# Patient Record
Sex: MALE | Race: OTHER | NOT HISPANIC OR LATINO
[De-identification: names, ages, dates, MRNs, and addresses within clinical notes are randomized per-mention and may not be internally consistent; named-entity substitution may affect disease eponyms.]

---

## 2020-04-02 ENCOUNTER — APPOINTMENT (OUTPATIENT)
Dept: ENDOCRINOLOGY | Facility: CLINIC | Age: 55
End: 2020-04-02
Payer: MEDICAID

## 2020-04-02 VITALS
TEMPERATURE: 98.7 F | SYSTOLIC BLOOD PRESSURE: 162 MMHG | OXYGEN SATURATION: 99 % | HEART RATE: 101 BPM | BODY MASS INDEX: 21.97 KG/M2 | DIASTOLIC BLOOD PRESSURE: 80 MMHG | WEIGHT: 162 LBS | RESPIRATION RATE: 16 BRPM

## 2020-04-02 LAB — GLUCOSE BLDC GLUCOMTR-MCNC: 187

## 2020-04-02 PROCEDURE — 82962 GLUCOSE BLOOD TEST: CPT

## 2020-04-02 PROCEDURE — 83036 HEMOGLOBIN GLYCOSYLATED A1C: CPT | Mod: QW

## 2020-04-02 PROCEDURE — 99204 OFFICE O/P NEW MOD 45 MIN: CPT

## 2020-04-02 RX ORDER — EMPAGLIFLOZIN AND METFORMIN HYDROCHLORIDE 12.5; 1 MG/1; MG/1
12.5-1 TABLET ORAL
Qty: 180 | Refills: 1 | Status: DISCONTINUED | COMMUNITY
Start: 2020-04-02 | End: 2020-04-02

## 2020-04-03 LAB — HBA1C MFR BLD HPLC: 9.6

## 2020-04-03 RX ORDER — LANCETS 28 GAUGE
EACH MISCELLANEOUS
Qty: 2 | Refills: 3 | Status: DISCONTINUED | COMMUNITY
Start: 2020-04-02 | End: 2020-04-03

## 2020-04-03 RX ORDER — BLOOD SUGAR DIAGNOSTIC
STRIP MISCELLANEOUS
Qty: 2 | Refills: 3 | Status: DISCONTINUED | COMMUNITY
Start: 2020-04-02 | End: 2020-04-03

## 2020-04-05 NOTE — HISTORY OF PRESENT ILLNESS
[FreeTextEntry1] : Mr. NIRU OTERO  is a 54 year-old  male  who presents for initial endocrine evaluation with regard to a hx of type 2 dm. The diabetes has been present for about 8   years. He states in 2012 he was dx with type 2 dm at time of cva. After this stroke, he has been left with mild numbness down left side of his body., He too was dx with htn. Since that time he he has been taking various medications for the dm with suboptimal glycemic control  He was recently in [place] and given the equivalent of Jardiance 12.5 mg bid and Metformin 850 mg bid in a combo pill-ie equivalent to this countries Synjardy.. He denies any history of neuropathy or nephropathy. With regard to neuropathy, He    . For the diabetes, He   is currently taking   He   denies polyuria, polydipsia, or any visual changes. He  too denies any skin lesions, skin breakdown or non-healing areas of skin. He too denies any podiatric concerns. Ophthalmologic evaluation is up to date. .  He  does deny any hypoglycemia or hypoglycemic signs or symptoms.\par Additional medical history includes that of hypertension and hyperlipidemia.  Bg's of late are not tested except in am with values in the 130's  He feels these numbers are much better than prior.\par He dd have full blood eval in [place] recently and will forward this info to us-he refused to carry out blood today.\par HbA1c today was 9.6%.\par

## 2020-06-15 ENCOUNTER — APPOINTMENT (OUTPATIENT)
Dept: ENDOCRINOLOGY | Facility: CLINIC | Age: 55
End: 2020-06-15
Payer: MEDICAID

## 2020-06-15 VITALS
SYSTOLIC BLOOD PRESSURE: 142 MMHG | TEMPERATURE: 98.5 F | DIASTOLIC BLOOD PRESSURE: 78 MMHG | WEIGHT: 168 LBS | HEIGHT: 72 IN | HEART RATE: 95 BPM | BODY MASS INDEX: 22.75 KG/M2 | OXYGEN SATURATION: 98 %

## 2020-06-15 LAB
GLUCOSE BLDC GLUCOMTR-MCNC: 121
HBA1C MFR BLD HPLC: 6.9

## 2020-06-15 PROCEDURE — 83036 HEMOGLOBIN GLYCOSYLATED A1C: CPT | Mod: QW

## 2020-06-15 PROCEDURE — 99214 OFFICE O/P EST MOD 30 MIN: CPT

## 2020-06-15 PROCEDURE — 82962 GLUCOSE BLOOD TEST: CPT

## 2020-06-15 NOTE — PHYSICAL EXAM
[Alert] : alert [No Acute Distress] : no acute distress [Well Nourished] : well nourished [Well Developed] : well developed [Normal Sclera/Conjunctiva] : normal sclera/conjunctiva [EOMI] : extra ocular movement intact [No Proptosis] : no proptosis [Normal Oropharynx] : the oropharynx was normal [No Thyroid Nodules] : no palpable thyroid nodules [Thyroid Not Enlarged] : the thyroid was not enlarged [No Respiratory Distress] : no respiratory distress [No Accessory Muscle Use] : no accessory muscle use [Normal S1, S2] : normal S1 and S2 [Clear to Auscultation] : lungs were clear to auscultation bilaterally [Regular Rhythm] : with a regular rhythm [No Edema] : no peripheral edema [Normal Rate] : heart rate was normal [Pedal Pulses Normal] : the pedal pulses are present [Normal Bowel Sounds] : normal bowel sounds [Not Distended] : not distended [Soft] : abdomen soft [Not Tender] : non-tender [Normal Posterior Cervical Nodes] : no posterior cervical lymphadenopathy [Normal Anterior Cervical Nodes] : no anterior cervical lymphadenopathy [No Spinal Tenderness] : no spinal tenderness [Spine Straight] : spine straight [Normal Gait] : normal gait [No Stigmata of Cushings Syndrome] : no stigmata of Cushings Syndrome [No Rash] : no rash [Normal Strength/Tone] : muscle strength and tone were normal [Normal Reflexes] : deep tendon reflexes were 2+ and symmetric [Acanthosis Nigricans] : no acanthosis nigricans [No Tremors] : no tremors [Oriented x3] : oriented to person, place, and time

## 2020-06-15 NOTE — HISTORY OF PRESENT ILLNESS
[FreeTextEntry1] : Mr. NIRU OTERO  is a 54 year-old  male  who  returns for evaluation evaluation with regard to a hx of type 2 dm. The diabetes has been present for about 8   years. He states in 2012 he was dx with type 2 dm at time of cva. After this stroke, he has been left with mild numbness down left side of his body., He too was dx with htn. Since that time he he has been taking various medications for the dm with suboptimal glycemic control  He is now takiing Glipizide 10 mg bid, Metfromin 1000 mg bid and Steglatro 15 mg daily.  He too is taking Lipitor, and Amlodopine. He denies any history of neuropathy or nephropathy. With regard to neuropathy, He    . For the diabetes, He   is currently taking   He   denies polyuria, polydipsia, or any visual changes. He  too denies any skin lesions, skin breakdown or non-healing areas of skin. He too denies any podiatric concerns. Ophthalmologic evaluation is up to date. .  He  does deny any hypoglycemia or hypoglycemic signs or symptoms.  A1c was 6.9% in April-- now  at 6.9%.\par Additional medical history includes that of hypertension and hyperlipidemia.  Bg's of late are not tested except in am with values in the 130's  He feels these numbers are much better than prior.\par \par \par

## 2020-07-17 ENCOUNTER — APPOINTMENT (OUTPATIENT)
Dept: ENDOCRINOLOGY | Facility: CLINIC | Age: 55
End: 2020-07-17
Payer: MEDICAID

## 2020-07-17 PROCEDURE — 99214 OFFICE O/P EST MOD 30 MIN: CPT | Mod: 95

## 2020-07-17 RX ORDER — SIMVASTATIN 40 MG/1
40 TABLET, FILM COATED ORAL
Qty: 90 | Refills: 1 | Status: DISCONTINUED | COMMUNITY
Start: 2020-07-07 | End: 2020-07-17

## 2020-07-21 RX ORDER — ROSUVASTATIN CALCIUM 10 MG/1
10 TABLET, FILM COATED ORAL
Qty: 60 | Refills: 0 | Status: DISCONTINUED | COMMUNITY
Start: 2020-07-17 | End: 2020-07-21

## 2020-08-08 NOTE — PHYSICAL EXAM
[Alert] : alert [Well Nourished] : well nourished [Oriented x3] : oriented to person, place, and time [Normal Affect] : the affect was normal [Normal Insight/Judgement] : insight and judgment were intact [Normal Mood] : the mood was normal

## 2020-08-08 NOTE — HISTORY OF PRESENT ILLNESS
[Home] : at home, [unfilled] , at the time of the visit. [Verbal consent obtained from patient] : the patient, [unfilled] [FreeTextEntry1] : Mr. NIRU OTERO  is a 54 year-old  male  who  returns for evaluation via telehealth with regard to a hx of type 2 dm. The diabetes has been present for about 8   years. He states in 2012 he was dx with type 2 dm at time of cva. After this stroke, he has been left with mild numbness down left side of his body., He too was dx with htn. Since that time he he has been taking various medications for the dm with suboptimal glycemic control  He is  taking Glipizide 10 mg now once per down, Metformin 1000 mg bid and Steglatro 15 mg daily.  He too is taking simvastatin and Amlodipine. He denies any history of neuropathy or nephropathy and has no known hx of retinopathy. He   denies polyuria, polydipsia, or any visual changes. He  too denies any skin lesions, skin breakdown or non-healing areas of skin. He too denies any podiatric concerns. Ophthalmologic evaluation is up to date. .He  does deny any hypoglycemia or hypoglycemic signs or symptoms.  A1c  6.9%in June .  AM  bg 110-140 lowest value of 80. and pre dinner 125-147\par Additional medical history includes that of hypertension and hyperlipidemia. He feels these numbers are much better than prior.\par \par \par

## 2020-08-24 ENCOUNTER — APPOINTMENT (OUTPATIENT)
Dept: ENDOCRINOLOGY | Facility: CLINIC | Age: 55
End: 2020-08-24

## 2020-08-24 NOTE — HISTORY OF PRESENT ILLNESS
[FreeTextEntry1] : Mr. NIRU OTERO  is a 54 year-old  male  who  returns for evaluation via telehealth with regard to a hx of type 2 dm. The diabetes has been present for about 8  years. Dx in 2012 with type 2 DM  at time of cva. After this stroke, he has been left with mild numbness down left side of his body., He too was dx with htn.   He is  taking Glipizide 10 mg now once per down, Metformin 1000 mg bid and Steglatro 15 mg daily.  He too is taking simvastatin and Amlodipine. He denies any history of neuropathy or nephropathy and has no known hx of retinopathy. He   denies polyuria, polydipsia, or any visual changes. He  too denies any skin lesions, skin breakdown or non-healing areas of skin. He too denies any podiatric concerns. Ophthalmologic evaluation is up to date. .He  does deny any hypoglycemia or hypoglycemic signs or symptoms.  AM  bg  lowest value of . and pre dinner  \par Additional medical history includes that of hypertension and hyperlipidemia. He continues on Atorvastatin 10 mg and amlodipine 10 mg.\par \par \par

## 2020-09-23 ENCOUNTER — APPOINTMENT (OUTPATIENT)
Dept: ENDOCRINOLOGY | Facility: CLINIC | Age: 55
End: 2020-09-23
Payer: MEDICAID

## 2020-09-23 VITALS
HEART RATE: 68 BPM | DIASTOLIC BLOOD PRESSURE: 80 MMHG | HEIGHT: 72 IN | BODY MASS INDEX: 23.03 KG/M2 | RESPIRATION RATE: 17 BRPM | SYSTOLIC BLOOD PRESSURE: 160 MMHG | WEIGHT: 170 LBS | OXYGEN SATURATION: 98 %

## 2020-09-23 PROCEDURE — 83036 HEMOGLOBIN GLYCOSYLATED A1C: CPT | Mod: QW

## 2020-09-23 PROCEDURE — 82962 GLUCOSE BLOOD TEST: CPT

## 2020-09-23 PROCEDURE — 99214 OFFICE O/P EST MOD 30 MIN: CPT | Mod: 25

## 2020-09-23 PROCEDURE — 36415 COLL VENOUS BLD VENIPUNCTURE: CPT

## 2020-09-23 RX ORDER — GLIPIZIDE 10 MG/1
10 TABLET ORAL DAILY
Qty: 90 | Refills: 1 | Status: DISCONTINUED | COMMUNITY
Start: 2020-04-02 | End: 2020-09-23

## 2020-09-23 NOTE — HISTORY OF PRESENT ILLNESS
[FreeTextEntry1] : Mr. OTERO is a 55 year year old  male who  returns today in follow up with regard to a history of type 2 diabetes mellitus. The diabetes mellitus has been present for about 9 years  There is no known history of retinopathy, nephropathy. He  too denies any history of neuropathy. Current dm medications include Glipizide 10 mg Qd and Metformin 1000 mg BID. and Steglatro Patient checks BG levels at home once daily. HGM of late has shown values to be running 100-140 mg/dL . Patient did have one reading of 84 mg/dL Patient denies any chest pain, sob, neurologic or ophthalmologic complaints. He  too denies any new podiatric concerns. He  is up to date with his ophthalmologic visit.\par POCT glucose returned today at 140 mg/dL\par POCT A1c returned today at 6.5%; previously 6.9% in June 2020.\par Tried Jardiance in the past but cost was a concern\par [pat h xof cva  some tingling grt\par

## 2020-10-07 LAB
25(OH)D3 SERPL-MCNC: 30.4 NG/ML
ALBUMIN SERPL ELPH-MCNC: 4.5 G/DL
ALP BLD-CCNC: 59 U/L
ALT SERPL-CCNC: 17 U/L
ANION GAP SERPL CALC-SCNC: 15 MMOL/L
AST SERPL-CCNC: 19 U/L
BILIRUB SERPL-MCNC: 0.3 MG/DL
BUN SERPL-MCNC: 18 MG/DL
CALCIUM SERPL-MCNC: 9.4 MG/DL
CHLORIDE SERPL-SCNC: 101 MMOL/L
CHOLEST SERPL-MCNC: 317 MG/DL
CO2 SERPL-SCNC: 24 MMOL/L
CREAT SERPL-MCNC: 1.22 MG/DL
CREAT SPEC-SCNC: 143 MG/DL
ESTIMATED AVERAGE GLUCOSE: 143 MG/DL
FRUCTOSAMINE SERPL-MCNC: 263 UMOL/L
GLUCOSE BLDC GLUCOMTR-MCNC: 140
GLUCOSE SERPL-MCNC: 126 MG/DL
GLYCOMARK.: 0.5 UG/ML
HBA1C MFR BLD HPLC: 6.5
HBA1C MFR BLD HPLC: 6.6 %
HDLC SERPL-MCNC: 60 MG/DL
LDLC SERPL DIRECT ASSAY-MCNC: 235 MG/DL
MICROALBUMIN 24H UR DL<=1MG/L-MCNC: 27.9 MG/DL
MICROALBUMIN/CREAT 24H UR-RTO: 195 MG/G
POTASSIUM SERPL-SCNC: 3.8 MMOL/L
PROT SERPL-MCNC: 7.3 G/DL
SODIUM SERPL-SCNC: 140 MMOL/L
T3FREE SERPL-MCNC: 2.56 PG/ML
T4 FREE SERPL-MCNC: 1.7 NG/DL
TRIGL SERPL-MCNC: 78 MG/DL
TSH SERPL-ACNC: 0.89 UIU/ML

## 2020-12-14 ENCOUNTER — APPOINTMENT (OUTPATIENT)
Dept: ENDOCRINOLOGY | Facility: CLINIC | Age: 55
End: 2020-12-14
Payer: MEDICAID

## 2020-12-21 ENCOUNTER — APPOINTMENT (OUTPATIENT)
Dept: ENDOCRINOLOGY | Facility: CLINIC | Age: 55
End: 2020-12-21
Payer: MEDICAID

## 2020-12-21 VITALS
DIASTOLIC BLOOD PRESSURE: 82 MMHG | BODY MASS INDEX: 22.89 KG/M2 | HEART RATE: 78 BPM | WEIGHT: 169 LBS | SYSTOLIC BLOOD PRESSURE: 140 MMHG | OXYGEN SATURATION: 99 % | RESPIRATION RATE: 16 BRPM | HEIGHT: 72 IN | TEMPERATURE: 98.6 F

## 2020-12-21 DIAGNOSIS — Z82.49 FAMILY HISTORY OF ISCHEMIC HEART DISEASE AND OTHER DISEASES OF THE CIRCULATORY SYSTEM: ICD-10-CM

## 2020-12-21 LAB
GLUCOSE BLDC GLUCOMTR-MCNC: 140
HBA1C MFR BLD HPLC: 6.3

## 2020-12-21 PROCEDURE — 99072 ADDL SUPL MATRL&STAF TM PHE: CPT

## 2020-12-21 PROCEDURE — 99214 OFFICE O/P EST MOD 30 MIN: CPT

## 2020-12-21 PROCEDURE — 82962 GLUCOSE BLOOD TEST: CPT

## 2020-12-21 PROCEDURE — 83036 HEMOGLOBIN GLYCOSYLATED A1C: CPT | Mod: QW

## 2020-12-21 RX ORDER — ATORVASTATIN CALCIUM 40 MG/1
40 TABLET, FILM COATED ORAL
Qty: 1 | Refills: 1 | Status: DISCONTINUED | COMMUNITY
Start: 2020-07-21 | End: 2020-12-21

## 2020-12-22 ENCOUNTER — NON-APPOINTMENT (OUTPATIENT)
Age: 55
End: 2020-12-22

## 2020-12-23 RX ORDER — ROSUVASTATIN CALCIUM 40 MG/1
40 TABLET, FILM COATED ORAL
Qty: 90 | Refills: 1 | Status: DISCONTINUED | COMMUNITY
Start: 2020-12-21 | End: 2020-12-23

## 2020-12-30 ENCOUNTER — APPOINTMENT (OUTPATIENT)
Dept: ENDOCRINOLOGY | Facility: CLINIC | Age: 55
End: 2020-12-30

## 2020-12-30 NOTE — HISTORY OF PRESENT ILLNESS
[FreeTextEntry1] : Mr. OTERO is a 55 year year old  male who  returns today in follow up with regard to a history of type 2 diabetes mellitus. The diabetes mellitus has been present for about 9 years  There is no known history of retinopathy, nephropathy. He  too denies any history of neuropathy. Current dm medications include Glipizide 10 mg Qd and Metformin 1000 mg BID. and Steglatro  15 mg. Patient checks BG levels at home once daily. HGM of late has shown values to be running 90's to low 100's . Patient did have one reading of 84 mg/dL Patient denies any chest pain, sob, neurologic or ophthalmologic complaints. He  too denies any new podiatric concerns. He  is up to date with his ophthalmologic visit.\par he is on Simvastatin 40 mg but was only taking 20 mg Too is on Amlodipine 10 mg and  asa\par Had mini stroke in past. \par  was on simvastatin 20 mg-will have to change.\par Fh  father MI around age 35-40\par

## 2021-01-25 ENCOUNTER — NON-APPOINTMENT (OUTPATIENT)
Age: 56
End: 2021-01-25

## 2021-01-25 ENCOUNTER — APPOINTMENT (OUTPATIENT)
Dept: ENDOCRINOLOGY | Facility: CLINIC | Age: 56
End: 2021-01-25

## 2021-01-25 ENCOUNTER — APPOINTMENT (OUTPATIENT)
Dept: CARDIOLOGY | Facility: CLINIC | Age: 56
End: 2021-01-25

## 2021-02-01 ENCOUNTER — APPOINTMENT (OUTPATIENT)
Dept: CARDIOLOGY | Facility: CLINIC | Age: 56
End: 2021-02-01

## 2021-02-01 ENCOUNTER — APPOINTMENT (OUTPATIENT)
Dept: ENDOCRINOLOGY | Facility: CLINIC | Age: 56
End: 2021-02-01

## 2021-02-22 ENCOUNTER — APPOINTMENT (OUTPATIENT)
Dept: CARDIOLOGY | Facility: CLINIC | Age: 56
End: 2021-02-22

## 2021-02-22 ENCOUNTER — APPOINTMENT (OUTPATIENT)
Dept: ENDOCRINOLOGY | Facility: CLINIC | Age: 56
End: 2021-02-22

## 2021-03-13 LAB
25(OH)D3 SERPL-MCNC: 36.1 NG/ML
ALBUMIN SERPL ELPH-MCNC: 4.3 G/DL
ALP BLD-CCNC: 58 U/L
ALT SERPL-CCNC: 16 U/L
ANION GAP SERPL CALC-SCNC: 12 MMOL/L
AST SERPL-CCNC: 16 U/L
BILIRUB SERPL-MCNC: 0.3 MG/DL
BUN SERPL-MCNC: 23 MG/DL
CALCIUM SERPL-MCNC: 9.4 MG/DL
CHLORIDE SERPL-SCNC: 104 MMOL/L
CHOLEST SERPL-MCNC: 226 MG/DL
CO2 SERPL-SCNC: 24 MMOL/L
CREAT SERPL-MCNC: 1.2 MG/DL
CREAT SPEC-SCNC: 171 MG/DL
ESTIMATED AVERAGE GLUCOSE: 140 MG/DL
FRUCTOSAMINE SERPL-MCNC: 259 UMOL/L
GLUCOSE SERPL-MCNC: 117 MG/DL
GLYCOMARK.: 0.7 UG/ML
HBA1C MFR BLD HPLC: 6.5 %
HDLC SERPL-MCNC: 62 MG/DL
LDLC SERPL DIRECT ASSAY-MCNC: 148 MG/DL
MICROALBUMIN 24H UR DL<=1MG/L-MCNC: 23.1 MG/DL
MICROALBUMIN/CREAT 24H UR-RTO: 135 MG/G
POTASSIUM SERPL-SCNC: 4.1 MMOL/L
PROT SERPL-MCNC: 7.1 G/DL
SODIUM SERPL-SCNC: 139 MMOL/L
T3FREE SERPL-MCNC: 2.32 PG/ML
T4 FREE SERPL-MCNC: 1.6 NG/DL
TRIGL SERPL-MCNC: 55 MG/DL
TSH SERPL-ACNC: 0.71 UIU/ML

## 2021-03-29 ENCOUNTER — APPOINTMENT (OUTPATIENT)
Dept: ENDOCRINOLOGY | Facility: CLINIC | Age: 56
End: 2021-03-29
Payer: MEDICAID

## 2021-03-29 VITALS
WEIGHT: 171 LBS | HEART RATE: 100 BPM | DIASTOLIC BLOOD PRESSURE: 82 MMHG | OXYGEN SATURATION: 97 % | RESPIRATION RATE: 16 BRPM | SYSTOLIC BLOOD PRESSURE: 150 MMHG | BODY MASS INDEX: 23.19 KG/M2 | TEMPERATURE: 98.6 F

## 2021-03-29 PROCEDURE — 82962 GLUCOSE BLOOD TEST: CPT

## 2021-03-29 PROCEDURE — 99214 OFFICE O/P EST MOD 30 MIN: CPT

## 2021-03-29 PROCEDURE — 99072 ADDL SUPL MATRL&STAF TM PHE: CPT

## 2021-03-29 PROCEDURE — 83036 HEMOGLOBIN GLYCOSYLATED A1C: CPT | Mod: QW

## 2021-03-29 NOTE — HISTORY OF PRESENT ILLNESS
[FreeTextEntry1] : Mr. OTERO is a 55 year old  male who  returns today in follow up with regard to a history of type 2 diabetes mellitus. The diabetes mellitus has been present for about 10 years  There is no known history of retinopathy, nephropathy. . Current dm medications include Glipizide 10 mg Qd , Alogliptin 25 mg dailly,  Metformin 1000 mg BID. and Steglatro  15 mg. Patient checks BG levels at home once daily. HGM of late has shown values to be running    in the low 100s in the AM and after eating. Patient denies any chest pain, sob, neurologic or ophthalmologic complaints. He  too denies any new podiatric concerns. He  is up to date with his ophthalmologic visit.\par POCT A1c returned today at 6.2%  ; previously 6.5% on 12/21/2021\par POCT glucose returned today at  157  mg/dL \par He is on Atorvastatin 80 mg Too is on Amlodipine 10 mg and  asa\par Had mini stroke in past. \par Fh  father MI around age 35-40\par

## 2021-03-30 ENCOUNTER — NON-APPOINTMENT (OUTPATIENT)
Age: 56
End: 2021-03-30

## 2021-03-30 LAB
GLUCOSE BLDC GLUCOMTR-MCNC: 157
HBA1C MFR BLD HPLC: 6.2

## 2021-07-12 ENCOUNTER — APPOINTMENT (OUTPATIENT)
Dept: ENDOCRINOLOGY | Facility: CLINIC | Age: 56
End: 2021-07-12

## 2021-07-12 NOTE — HISTORY OF PRESENT ILLNESS
[FreeTextEntry1] : Mr. OTERO is a 55 year old male who returns today in follow up with regard to a history of type 2 diabetes mellitus. The diabetes mellitus has been present for about 10 years There is no known history of retinopathy, nephropathy. . Current dm medications include Glipizide 10 mg Qd , Alogliptin 25 mg dailly, Metformin 1000 mg BID. and Steglatro 15 mg. Patient checks BG levels at home once daily. HGM of late has shown values to be running           Patient denies any chest pain, sob, neurologic or ophthalmologic complaints. He too denies any new podiatric concerns. He is up to date with his ophthalmologic visit.\par POCT A1c returned today at    ; previously \par POCT glucose returned today at    mg/dL \par He is on Atorvastatin 80 mg Too is on Amlodipine 10 mg and asa\par Had mini stroke in past.

## 2021-11-08 ENCOUNTER — APPOINTMENT (OUTPATIENT)
Dept: ENDOCRINOLOGY | Facility: CLINIC | Age: 56
End: 2021-11-08
Payer: MEDICAID

## 2021-11-08 VITALS
HEIGHT: 72 IN | DIASTOLIC BLOOD PRESSURE: 82 MMHG | SYSTOLIC BLOOD PRESSURE: 147 MMHG | WEIGHT: 159 LBS | TEMPERATURE: 98.1 F | HEART RATE: 50 BPM | BODY MASS INDEX: 21.54 KG/M2 | OXYGEN SATURATION: 98 %

## 2021-11-08 DIAGNOSIS — N52.9 MALE ERECTILE DYSFUNCTION, UNSPECIFIED: ICD-10-CM

## 2021-11-08 LAB
GLUCOSE BLDC GLUCOMTR-MCNC: 122
HBA1C MFR BLD HPLC: 6.1

## 2021-11-08 PROCEDURE — 83036 HEMOGLOBIN GLYCOSYLATED A1C: CPT | Mod: QW

## 2021-11-08 PROCEDURE — 99214 OFFICE O/P EST MOD 30 MIN: CPT

## 2021-11-08 PROCEDURE — 82962 GLUCOSE BLOOD TEST: CPT

## 2021-11-12 NOTE — HISTORY OF PRESENT ILLNESS
[FreeTextEntry1] : Mr. OTERO is a 56 year old  male who  returns today in follow up with regard to a history of type 2 diabetes mellitus. The diabetes mellitus has been present for about 10 years  There is no known history of retinopathy, nephropathy. He  too denies any history of neuropathy. Current dm medications include Glipizide 10 mg Qd , Alogliptin 25 mg dailly,  Metformin 1000 mg BID. and Steglatro  15 mg. Patient checks BG levels at home once daily. HGM of late has shown values to be running  82- 103 in the am. Occasional values in the 60s.  Patient denies any chest pain, sob, neurologic or ophthalmologic complaints. He  too denies any new podiatric concerns. He  is up to date with his ophthalmologic visit Has lost 10 lbs in the past few months- states he has decreased carbohydrates from his diet. Has been juicing squash in the evening. \par POCT A1c returned today at 6.1%  ; previously 6.2% from 3/29/2021\par POCT glucose returned today at  122 mg/dL \par He is on Atorvastatin 80 mg Too is on Amlodipine 10 mg (has not taken for the past 5 days)  and  asa.\par Was told by urologist Amlodipine may be attributing to his erectile dysfunction. \par Had mini stroke in past. \par \par Fh  father MI around age 35-40\par

## 2021-11-16 ENCOUNTER — NON-APPOINTMENT (OUTPATIENT)
Age: 56
End: 2021-11-16

## 2021-11-17 LAB
25(OH)D3 SERPL-MCNC: 30.3 NG/ML
ALBUMIN SERPL ELPH-MCNC: 4.2 G/DL
ALP BLD-CCNC: 67 U/L
ALT SERPL-CCNC: 26 U/L
ANION GAP SERPL CALC-SCNC: 16 MMOL/L
AST SERPL-CCNC: 18 U/L
BASOPHILS # BLD AUTO: 0.05 K/UL
BASOPHILS NFR BLD AUTO: 0.5 %
BILIRUB SERPL-MCNC: 0.4 MG/DL
BUN SERPL-MCNC: 23 MG/DL
CALCIUM SERPL-MCNC: 9.9 MG/DL
CHLORIDE SERPL-SCNC: 101 MMOL/L
CHOLEST SERPL-MCNC: 317 MG/DL
CO2 SERPL-SCNC: 23 MMOL/L
CREAT SERPL-MCNC: 1.32 MG/DL
CREAT SPEC-SCNC: 215 MG/DL
EOSINOPHIL # BLD AUTO: 0.25 K/UL
EOSINOPHIL NFR BLD AUTO: 2.5 %
ESTIMATED AVERAGE GLUCOSE: 134 MG/DL
FRUCTOSAMINE SERPL-MCNC: 282 UMOL/L
GLUCOSE SERPL-MCNC: 109 MG/DL
GLYCOMARK.: 0.5 UG/ML
HBA1C MFR BLD HPLC: 6.3 %
HCT VFR BLD CALC: 51.2 %
HDLC SERPL-MCNC: 58 MG/DL
HGB BLD-MCNC: 16.1 G/DL
IMM GRANULOCYTES NFR BLD AUTO: 0.4 %
LDLC SERPL DIRECT ASSAY-MCNC: 229 MG/DL
LYMPHOCYTES # BLD AUTO: 2.01 K/UL
LYMPHOCYTES NFR BLD AUTO: 20.4 %
MAN DIFF?: NORMAL
MCHC RBC-ENTMCNC: 26.2 PG
MCHC RBC-ENTMCNC: 31.4 GM/DL
MCV RBC AUTO: 83.3 FL
MICROALBUMIN 24H UR DL<=1MG/L-MCNC: 28.1 MG/DL
MICROALBUMIN/CREAT 24H UR-RTO: 131 MG/G
MONOCYTES # BLD AUTO: 0.67 K/UL
MONOCYTES NFR BLD AUTO: 6.8 %
NEUTROPHILS # BLD AUTO: 6.83 K/UL
NEUTROPHILS NFR BLD AUTO: 69.4 %
PLATELET # BLD AUTO: 306 K/UL
POTASSIUM SERPL-SCNC: 4.1 MMOL/L
PROT SERPL-MCNC: 7.4 G/DL
RBC # BLD: 6.15 M/UL
RBC # FLD: 13.7 %
SODIUM SERPL-SCNC: 140 MMOL/L
T3FREE SERPL-MCNC: 2.43 PG/ML
T4 FREE SERPL-MCNC: 1.8 NG/DL
TRIGL SERPL-MCNC: 99 MG/DL
TSH SERPL-ACNC: 1.4 UIU/ML
VIT B12 SERPL-MCNC: 336 PG/ML
WBC # FLD AUTO: 9.85 K/UL

## 2021-11-18 ENCOUNTER — NON-APPOINTMENT (OUTPATIENT)
Age: 56
End: 2021-11-18

## 2021-11-18 RX ORDER — EVOLOCUMAB 140 MG/ML
140 INJECTION, SOLUTION SUBCUTANEOUS
Qty: 2 | Refills: 1 | Status: DISCONTINUED | COMMUNITY
Start: 2021-11-17 | End: 2021-11-18

## 2021-12-02 ENCOUNTER — NON-APPOINTMENT (OUTPATIENT)
Age: 56
End: 2021-12-02

## 2022-01-24 ENCOUNTER — APPOINTMENT (OUTPATIENT)
Dept: ENDOCRINOLOGY | Facility: CLINIC | Age: 57
End: 2022-01-24
Payer: MEDICAID

## 2022-01-24 VITALS
RESPIRATION RATE: 15 BRPM | HEART RATE: 95 BPM | SYSTOLIC BLOOD PRESSURE: 158 MMHG | OXYGEN SATURATION: 98 % | WEIGHT: 159 LBS | TEMPERATURE: 98.6 F | DIASTOLIC BLOOD PRESSURE: 82 MMHG | BODY MASS INDEX: 21.56 KG/M2

## 2022-01-24 VITALS — DIASTOLIC BLOOD PRESSURE: 84 MMHG | SYSTOLIC BLOOD PRESSURE: 150 MMHG

## 2022-01-24 LAB
GLUCOSE BLDC GLUCOMTR-MCNC: 138
HBA1C MFR BLD HPLC: 6.2

## 2022-01-24 PROCEDURE — 99214 OFFICE O/P EST MOD 30 MIN: CPT

## 2022-01-24 PROCEDURE — 83036 HEMOGLOBIN GLYCOSYLATED A1C: CPT | Mod: QW

## 2022-01-24 PROCEDURE — 82962 GLUCOSE BLOOD TEST: CPT

## 2022-01-24 RX ORDER — BLOOD-GLUCOSE METER
KIT MISCELLANEOUS
Qty: 1 | Refills: 0 | Status: ACTIVE | COMMUNITY
Start: 2022-01-24 | End: 1900-01-01

## 2022-01-24 RX ORDER — BLOOD SUGAR DIAGNOSTIC
STRIP MISCELLANEOUS 3 TIMES DAILY
Qty: 3 | Refills: 1 | Status: DISCONTINUED | COMMUNITY
Start: 2020-04-03 | End: 2022-01-24

## 2022-01-24 RX ORDER — BLOOD-GLUCOSE METER
W/DEVICE KIT MISCELLANEOUS
Qty: 1 | Refills: 0 | Status: DISCONTINUED | COMMUNITY
Start: 2020-04-03 | End: 2022-01-24

## 2022-01-24 RX ORDER — LANCETS 28 GAUGE
EACH MISCELLANEOUS
Qty: 2 | Refills: 2 | Status: ACTIVE | COMMUNITY
Start: 2022-01-24 | End: 1900-01-01

## 2022-01-24 RX ORDER — BLOOD SUGAR DIAGNOSTIC
STRIP MISCELLANEOUS
Qty: 2 | Refills: 2 | Status: ACTIVE | COMMUNITY
Start: 2022-01-24 | End: 1900-01-01

## 2022-01-24 RX ORDER — LANCETS
EACH MISCELLANEOUS
Qty: 3 | Refills: 1 | Status: DISCONTINUED | COMMUNITY
Start: 2020-04-03 | End: 2022-01-24

## 2022-03-21 ENCOUNTER — APPOINTMENT (OUTPATIENT)
Dept: ENDOCRINOLOGY | Facility: CLINIC | Age: 57
End: 2022-03-21

## 2022-04-04 ENCOUNTER — APPOINTMENT (OUTPATIENT)
Dept: ENDOCRINOLOGY | Facility: CLINIC | Age: 57
End: 2022-04-04

## 2022-05-16 ENCOUNTER — APPOINTMENT (OUTPATIENT)
Dept: ENDOCRINOLOGY | Facility: CLINIC | Age: 57
End: 2022-05-16

## 2022-05-16 NOTE — HISTORY OF PRESENT ILLNESS
[FreeTextEntry1] : Mr. OTERO is a 56 year old  male who  returns today in follow up with regard to a history of type 2 diabetes mellitus. The diabetes mellitus has been present for about 10 years  There is no known history of retinopathy, nephropathy. He  too denies any history of neuropathy. Current dm medications include Glipizide 5 mg Qd , Alogliptin 25 mg daily,  Metformin 1000 mg BID, and Steglatro  15 mg. Patient checks BG levels at home once daily. HGM of late has shown values to be running  105- 110 in the am, 130s 2 hrs after dinner. Denies any hypoglycemic episodes. Testing about 3-4 days per week at varied times throughout the day. Patient denies any chest pain, sob, neurologic or ophthalmologic complaints. He  too denies any new podiatric concerns. He  is up to date with his ophthalmologic visit  At last visit lost weight with adjustment in diet and juicing vegetables. Has continued to lose 10 lbs. \par POCT A1c returned today at 6.2% ; previously 6.3% from 11/8/2021\par POCT glucose returned today at 138\par 11/8/2021 LDL was 229 mg/dL, albumin/ creatinine ratio was 131 mg/g\par \par He is on Atorvastatin 80 mg. Too is on Amlodipine 5 mg and  asa. Off Losartan. Not taking Ezetimibe. \par \par Had mini stroke in past- continues to note left sided numbness. Following with neurology. \par \par FH:  father MI around age 35-40\par

## 2022-05-16 NOTE — HISTORY OF PRESENT ILLNESS
[FreeTextEntry1] : Mr. OTERO is a 56 year old  male who  returns today in follow up with regard to a history of type 2 diabetes mellitus. The diabetes mellitus has been present for over 10 years  There is no known history of retinopathy, nephropathy. He  too denies any history of neuropathy. Current dm medications include Glipizide Er 5  mg Qd , Alogliptin 25 mg dailly,  Metformin 1000 mg BID. and Steglatro  15 mg. Patient checks BG levels at home once daily. HGM of late has shown values to be running              .  Patient denies any chest pain, sob, neurologic or ophthalmologic complaints. He  too denies any new podiatric concerns. He  is up to date with his ophthalmologic visit  Over the last year-he had lost weight with adjustment in diet and juicing vegetables.\par \par POCT A1c returned today at \par POCT glucose returned today at \par He is on Atorvastatin 80 mg. Too is on Amlodipine 5 mg , Losartan 50 mg and  asa.\par Had mini stroke in past. \par \par FH:  father MI around age 35-40\par

## 2022-08-29 ENCOUNTER — APPOINTMENT (OUTPATIENT)
Dept: ENDOCRINOLOGY | Facility: CLINIC | Age: 57
End: 2022-08-29

## 2022-08-29 VITALS
HEART RATE: 93 BPM | DIASTOLIC BLOOD PRESSURE: 86 MMHG | WEIGHT: 160 LBS | SYSTOLIC BLOOD PRESSURE: 178 MMHG | BODY MASS INDEX: 21.67 KG/M2 | HEIGHT: 72 IN | OXYGEN SATURATION: 99 %

## 2022-08-29 VITALS — DIASTOLIC BLOOD PRESSURE: 80 MMHG | SYSTOLIC BLOOD PRESSURE: 162 MMHG

## 2022-08-29 DIAGNOSIS — I63.9 CEREBRAL INFARCTION, UNSPECIFIED: ICD-10-CM

## 2022-08-29 LAB
GLUCOSE BLDC GLUCOMTR-MCNC: 155
HBA1C MFR BLD HPLC: 6

## 2022-08-29 PROCEDURE — 36415 COLL VENOUS BLD VENIPUNCTURE: CPT

## 2022-08-29 PROCEDURE — 99214 OFFICE O/P EST MOD 30 MIN: CPT | Mod: 25

## 2022-08-29 PROCEDURE — 82962 GLUCOSE BLOOD TEST: CPT

## 2022-08-29 PROCEDURE — 83036 HEMOGLOBIN GLYCOSYLATED A1C: CPT | Mod: QW

## 2022-08-29 NOTE — HISTORY OF PRESENT ILLNESS
[FreeTextEntry1] : Mr. OTERO is a 57 year old male who returns today in follow up with regard to a history of type 2 diabetes mellitus. The diabetes mellitus has been present for about 10 years There is no known history of retinopathy, nephropathy. He too denies any history of neuropathy. \par \par Current dm medications include Glipizide 5 mg Qd, Metformin 1000 mg BID, and Steglatro 15 mg.\par \par Patient checks BG levels once weekly  HGM of late has shown values to be running -120 and after meals 140-150. Denies any hypoglycemic episodes.\par \par Patient denies any chest pain, sob, neurologic or ophthalmologic complaints. He too denies any new podiatric concerns. He is up to date with his ophthalmologic visit \par \par Weight has been stable at 160 lbs \par \par POCT A1c returned today at 6.0%  ; previously 6.2% \par POCT glucose returned today at 155\par \par 11/8/2021 LDL was 229 mg/dL, albumin/ creatinine ratio was 131 mg/g\par \par He is on Atorvastatin 80 mg 2-3x per week . Too is on Amlodipine 5 mg and asa on Losartan. Not taking Ezetimibe. \par \par Had mini stroke in past- continues to note left sided numbness. Following with neurology. \par \par FH: father MI around age 35-40\par \par PCP: Dr. Kavon Kennedy.\par

## 2022-08-30 LAB
25(OH)D3 SERPL-MCNC: 36.5 NG/ML
ALBUMIN SERPL ELPH-MCNC: 4.2 G/DL
ALP BLD-CCNC: 62 U/L
ALT SERPL-CCNC: 26 U/L
ANION GAP SERPL CALC-SCNC: 17 MMOL/L
APO B SERPL-MCNC: 110 MG/DL
AST SERPL-CCNC: 31 U/L
BILIRUB SERPL-MCNC: 0.3 MG/DL
BUN SERPL-MCNC: 16 MG/DL
CALCIUM SERPL-MCNC: 9.4 MG/DL
CHLORIDE SERPL-SCNC: 106 MMOL/L
CHOLEST SERPL-MCNC: 202 MG/DL
CO2 SERPL-SCNC: 18 MMOL/L
CREAT SERPL-MCNC: 1.26 MG/DL
CREAT SPEC-SCNC: 101 MG/DL
EGFR: 67 ML/MIN/1.73M2
ESTIMATED AVERAGE GLUCOSE: 126 MG/DL
FRUCTOSAMINE SERPL-MCNC: 257 UMOL/L
GLUCOSE SERPL-MCNC: 129 MG/DL
GLYCOMARK.: 0.9 UG/ML
HBA1C MFR BLD HPLC: 6 %
HDLC SERPL-MCNC: 61 MG/DL
LDLC SERPL CALC-MCNC: 129 MG/DL
LDLC SERPL DIRECT ASSAY-MCNC: 116 MG/DL
MAGNESIUM SERPL-MCNC: 2 MG/DL
MICROALBUMIN 24H UR DL<=1MG/L-MCNC: 6.9 MG/DL
MICROALBUMIN/CREAT 24H UR-RTO: 68 MG/G
NONHDLC SERPL-MCNC: 141 MG/DL
POTASSIUM SERPL-SCNC: 4 MMOL/L
PROT SERPL-MCNC: 7.2 G/DL
SODIUM SERPL-SCNC: 141 MMOL/L
T3FREE SERPL-MCNC: 2.2 PG/ML
T4 FREE SERPL-MCNC: 1.7 NG/DL
TRIGL SERPL-MCNC: 57 MG/DL
TSH SERPL-ACNC: 0.91 UIU/ML
VIT B12 SERPL-MCNC: 306 PG/ML

## 2022-08-31 LAB — APO LP(A) SERPL-MCNC: 24.1 NMOL/L

## 2022-08-31 RX ORDER — ADHESIVE TAPE 3"X 2.3 YD
50 MCG TAPE, NON-MEDICATED TOPICAL
Qty: 90 | Refills: 0 | Status: ACTIVE | COMMUNITY
Start: 2022-08-31 | End: 1900-01-01

## 2022-09-03 ENCOUNTER — TRANSCRIPTION ENCOUNTER (OUTPATIENT)
Age: 57
End: 2022-09-03

## 2022-09-03 ENCOUNTER — EMERGENCY (EMERGENCY)
Facility: HOSPITAL | Age: 57
LOS: 0 days | Discharge: HOME | End: 2022-09-03
Attending: STUDENT IN AN ORGANIZED HEALTH CARE EDUCATION/TRAINING PROGRAM

## 2022-09-03 VITALS
RESPIRATION RATE: 19 BRPM | SYSTOLIC BLOOD PRESSURE: 177 MMHG | DIASTOLIC BLOOD PRESSURE: 82 MMHG | HEIGHT: 73 IN | TEMPERATURE: 98 F | HEART RATE: 107 BPM | WEIGHT: 160.94 LBS | OXYGEN SATURATION: 97 %

## 2022-09-03 DIAGNOSIS — R10.12 LEFT UPPER QUADRANT PAIN: ICD-10-CM

## 2022-09-03 DIAGNOSIS — I10 ESSENTIAL (PRIMARY) HYPERTENSION: ICD-10-CM

## 2022-09-03 DIAGNOSIS — Z98.89 OTHER SPECIFIED POSTPROCEDURAL STATES: Chronic | ICD-10-CM

## 2022-09-03 DIAGNOSIS — R10.13 EPIGASTRIC PAIN: ICD-10-CM

## 2022-09-03 DIAGNOSIS — E11.9 TYPE 2 DIABETES MELLITUS WITHOUT COMPLICATIONS: ICD-10-CM

## 2022-09-03 LAB
ALBUMIN SERPL ELPH-MCNC: 3.2 G/DL — LOW (ref 3.3–5)
ALP SERPL-CCNC: 59 U/L — SIGNIFICANT CHANGE UP (ref 40–120)
ALT FLD-CCNC: 24 U/L — SIGNIFICANT CHANGE UP (ref 12–78)
ANION GAP SERPL CALC-SCNC: 7 MMOL/L — SIGNIFICANT CHANGE UP (ref 5–17)
AST SERPL-CCNC: 22 U/L — SIGNIFICANT CHANGE UP (ref 15–37)
BASOPHILS # BLD AUTO: 0.03 K/UL — SIGNIFICANT CHANGE UP (ref 0–0.2)
BASOPHILS NFR BLD AUTO: 0.4 % — SIGNIFICANT CHANGE UP (ref 0–2)
BILIRUB SERPL-MCNC: 0.5 MG/DL — SIGNIFICANT CHANGE UP (ref 0.2–1.2)
BUN SERPL-MCNC: 23 MG/DL — SIGNIFICANT CHANGE UP (ref 7–23)
CALCIUM SERPL-MCNC: 8.8 MG/DL — SIGNIFICANT CHANGE UP (ref 8.5–10.1)
CHLORIDE SERPL-SCNC: 106 MMOL/L — SIGNIFICANT CHANGE UP (ref 96–108)
CO2 SERPL-SCNC: 26 MMOL/L — SIGNIFICANT CHANGE UP (ref 22–31)
CREAT SERPL-MCNC: 1.35 MG/DL — HIGH (ref 0.5–1.3)
EGFR: 62 ML/MIN/1.73M2 — SIGNIFICANT CHANGE UP
EOSINOPHIL # BLD AUTO: 0.71 K/UL — HIGH (ref 0–0.5)
EOSINOPHIL NFR BLD AUTO: 9.4 % — HIGH (ref 0–6)
GLUCOSE SERPL-MCNC: 191 MG/DL — HIGH (ref 70–99)
HCT VFR BLD CALC: 42.6 % — SIGNIFICANT CHANGE UP (ref 39–50)
HGB BLD-MCNC: 13.6 G/DL — SIGNIFICANT CHANGE UP (ref 13–17)
IMM GRANULOCYTES NFR BLD AUTO: 0.4 % — SIGNIFICANT CHANGE UP (ref 0–1.5)
LACTATE SERPL-SCNC: 1.4 MMOL/L — SIGNIFICANT CHANGE UP (ref 0.7–2)
LIDOCAIN IGE QN: 432 U/L — HIGH (ref 73–393)
LYMPHOCYTES # BLD AUTO: 1.46 K/UL — SIGNIFICANT CHANGE UP (ref 1–3.3)
LYMPHOCYTES # BLD AUTO: 19.4 % — SIGNIFICANT CHANGE UP (ref 13–44)
MAGNESIUM SERPL-MCNC: 2.1 MG/DL — SIGNIFICANT CHANGE UP (ref 1.6–2.6)
MCHC RBC-ENTMCNC: 25.7 PG — LOW (ref 27–34)
MCHC RBC-ENTMCNC: 31.9 G/DL — LOW (ref 32–36)
MCV RBC AUTO: 80.4 FL — SIGNIFICANT CHANGE UP (ref 80–100)
MONOCYTES # BLD AUTO: 0.57 K/UL — SIGNIFICANT CHANGE UP (ref 0–0.9)
MONOCYTES NFR BLD AUTO: 7.6 % — SIGNIFICANT CHANGE UP (ref 2–14)
NEUTROPHILS # BLD AUTO: 4.74 K/UL — SIGNIFICANT CHANGE UP (ref 1.8–7.4)
NEUTROPHILS NFR BLD AUTO: 62.8 % — SIGNIFICANT CHANGE UP (ref 43–77)
NRBC # BLD: 0 /100 WBCS — SIGNIFICANT CHANGE UP (ref 0–0)
PHOSPHATE SERPL-MCNC: 3.5 MG/DL — SIGNIFICANT CHANGE UP (ref 2.5–4.5)
PLATELET # BLD AUTO: 245 K/UL — SIGNIFICANT CHANGE UP (ref 150–400)
POTASSIUM SERPL-MCNC: 3.5 MMOL/L — SIGNIFICANT CHANGE UP (ref 3.5–5.3)
POTASSIUM SERPL-SCNC: 3.5 MMOL/L — SIGNIFICANT CHANGE UP (ref 3.5–5.3)
PROT SERPL-MCNC: 7 GM/DL — SIGNIFICANT CHANGE UP (ref 6–8.3)
RBC # BLD: 5.3 M/UL — SIGNIFICANT CHANGE UP (ref 4.2–5.8)
RBC # FLD: 14.2 % — SIGNIFICANT CHANGE UP (ref 10.3–14.5)
SODIUM SERPL-SCNC: 139 MMOL/L — SIGNIFICANT CHANGE UP (ref 135–145)
WBC # BLD: 7.54 K/UL — SIGNIFICANT CHANGE UP (ref 3.8–10.5)
WBC # FLD AUTO: 7.54 K/UL — SIGNIFICANT CHANGE UP (ref 3.8–10.5)

## 2022-09-03 PROCEDURE — 71045 X-RAY EXAM CHEST 1 VIEW: CPT | Mod: 26

## 2022-09-03 PROCEDURE — 93010 ELECTROCARDIOGRAM REPORT: CPT

## 2022-09-03 PROCEDURE — 99285 EMERGENCY DEPT VISIT HI MDM: CPT

## 2022-09-03 RX ORDER — FAMOTIDINE 10 MG/ML
20 INJECTION INTRAVENOUS ONCE
Refills: 0 | Status: COMPLETED | OUTPATIENT
Start: 2022-09-03 | End: 2022-09-03

## 2022-09-03 RX ORDER — ACETAMINOPHEN 500 MG
650 TABLET ORAL ONCE
Refills: 0 | Status: COMPLETED | OUTPATIENT
Start: 2022-09-03 | End: 2022-09-03

## 2022-09-03 RX ADMIN — FAMOTIDINE 20 MILLIGRAM(S): 10 INJECTION INTRAVENOUS at 17:31

## 2022-09-03 RX ADMIN — Medication 650 MILLIGRAM(S): at 17:31

## 2022-09-03 RX ADMIN — Medication 30 MILLILITER(S): at 17:31

## 2022-09-03 NOTE — ED PROVIDER NOTE - PATIENT PORTAL LINK FT
You can access the FollowMyHealth Patient Portal offered by [facility] by registering at the following website: [email or web address]. By joining [facility]’s FollowMyHealth portal, you will also be able to view your health information using other applications (apps) compatible with our system.

## 2022-09-03 NOTE — ED ADULT TRIAGE NOTE - CHIEF COMPLAINT QUOTE
patient c/o of LUQ abdominal pain started 2 days ago , denied chest pain denied difficulty breathing , no N/V

## 2022-09-03 NOTE — ED PROVIDER NOTE - CLINICAL SUMMARY MEDICAL DECISION MAKING FREE TEXT BOX
56 yo male with pmhx htn, dm, presenting with luq abd pain for one day. suggestive of gastritis, less likely ACS. in no distress currently. will eval with labs, ekg, cxr, will give gi cocktail and will reassess.

## 2022-09-03 NOTE — ED PROVIDER NOTE - PROGRESS NOTE DETAILS
ZOEY Coreas MD  labs wnl with mild elevation of ct, pt informed abt it. cxr neg, will dc with pmd f/u

## 2022-09-03 NOTE — ED PROVIDER NOTE - OBJECTIVE STATEMENT
58 yo male with pmhx htn, dm, presenting with luq abd pain for one day. pt states he ate a cheesy omelette yesterday and then began to feel left upper abd pain, relieved by burping. pain persisted today after eating some food and snacks, so came to ED (PCP was not open). denies any prior occurrences. took apap yesterday with mild relief.    denies n/v, fevers, sick contacts, diarrhea, cp, sob.

## 2022-09-05 NOTE — ED POST DISCHARGE NOTE - DETAILS
Called patient and made aware of the results and advised pt to follow up with PMD. All questions answered.

## 2022-09-05 NOTE — ED POST DISCHARGE NOTE - NSPOSTDCCALLS_ED_ALL_ED_NU
Liseth Cote, the Care Advisor Nurse @  called & said that Roman was at urology yesterday and they removed his gerardo cath.  He was in the ER last night because he could not void and they placed a new gerardo.   Liseth is req an order for home health for gerardo education and PRN visits to help with his cath.   [phone]   1

## 2022-12-19 ENCOUNTER — INPATIENT (INPATIENT)
Facility: HOSPITAL | Age: 57
LOS: 8 days | Discharge: HOME | DRG: 226 | End: 2022-12-28
Attending: STUDENT IN AN ORGANIZED HEALTH CARE EDUCATION/TRAINING PROGRAM | Admitting: HOSPITALIST
Payer: MEDICAID

## 2022-12-19 ENCOUNTER — EMERGENCY (EMERGENCY)
Facility: HOSPITAL | Age: 57
LOS: 0 days | Discharge: ANOTHER HOSPITAL | End: 2022-12-19
Attending: STUDENT IN AN ORGANIZED HEALTH CARE EDUCATION/TRAINING PROGRAM
Payer: MEDICAID

## 2022-12-19 VITALS
RESPIRATION RATE: 18 BRPM | HEIGHT: 73 IN | TEMPERATURE: 98 F | DIASTOLIC BLOOD PRESSURE: 58 MMHG | HEART RATE: 36 BPM | WEIGHT: 160.94 LBS | SYSTOLIC BLOOD PRESSURE: 171 MMHG

## 2022-12-19 VITALS
HEART RATE: 40 BPM | SYSTOLIC BLOOD PRESSURE: 130 MMHG | TEMPERATURE: 98 F | OXYGEN SATURATION: 96 % | RESPIRATION RATE: 20 BRPM | DIASTOLIC BLOOD PRESSURE: 62 MMHG

## 2022-12-19 VITALS
HEIGHT: 70 IN | TEMPERATURE: 98 F | DIASTOLIC BLOOD PRESSURE: 62 MMHG | SYSTOLIC BLOOD PRESSURE: 142 MMHG | OXYGEN SATURATION: 98 % | HEART RATE: 35 BPM | RESPIRATION RATE: 16 BRPM | WEIGHT: 169.98 LBS

## 2022-12-19 DIAGNOSIS — Z98.89 OTHER SPECIFIED POSTPROCEDURAL STATES: Chronic | ICD-10-CM

## 2022-12-19 DIAGNOSIS — R53.1 WEAKNESS: ICD-10-CM

## 2022-12-19 DIAGNOSIS — R00.1 BRADYCARDIA, UNSPECIFIED: ICD-10-CM

## 2022-12-19 DIAGNOSIS — Z98.890 OTHER SPECIFIED POSTPROCEDURAL STATES: ICD-10-CM

## 2022-12-19 DIAGNOSIS — R11.10 VOMITING, UNSPECIFIED: ICD-10-CM

## 2022-12-19 DIAGNOSIS — I69.351 HEMIPLEGIA AND HEMIPARESIS FOLLOWING CEREBRAL INFARCTION AFFECTING RIGHT DOMINANT SIDE: ICD-10-CM

## 2022-12-19 DIAGNOSIS — Z79.84 LONG TERM (CURRENT) USE OF ORAL HYPOGLYCEMIC DRUGS: ICD-10-CM

## 2022-12-19 DIAGNOSIS — R42 DIZZINESS AND GIDDINESS: ICD-10-CM

## 2022-12-19 DIAGNOSIS — E11.9 TYPE 2 DIABETES MELLITUS WITHOUT COMPLICATIONS: ICD-10-CM

## 2022-12-19 DIAGNOSIS — Z20.822 CONTACT WITH AND (SUSPECTED) EXPOSURE TO COVID-19: ICD-10-CM

## 2022-12-19 DIAGNOSIS — I44.1 ATRIOVENTRICULAR BLOCK, SECOND DEGREE: ICD-10-CM

## 2022-12-19 LAB
ALBUMIN SERPL ELPH-MCNC: 3.5 G/DL — SIGNIFICANT CHANGE UP (ref 3.3–5)
ALP SERPL-CCNC: 54 U/L — SIGNIFICANT CHANGE UP (ref 40–120)
ALT FLD-CCNC: 25 U/L — SIGNIFICANT CHANGE UP (ref 12–78)
ANION GAP SERPL CALC-SCNC: 8 MMOL/L — SIGNIFICANT CHANGE UP (ref 5–17)
APTT BLD: 32 SEC — SIGNIFICANT CHANGE UP (ref 27.5–35.5)
AST SERPL-CCNC: 16 U/L — SIGNIFICANT CHANGE UP (ref 15–37)
BASOPHILS # BLD AUTO: 0.03 K/UL — SIGNIFICANT CHANGE UP (ref 0–0.2)
BASOPHILS NFR BLD AUTO: 0.3 % — SIGNIFICANT CHANGE UP (ref 0–2)
BILIRUB SERPL-MCNC: 0.4 MG/DL — SIGNIFICANT CHANGE UP (ref 0.2–1.2)
BUN SERPL-MCNC: 30 MG/DL — HIGH (ref 7–23)
CALCIUM SERPL-MCNC: 9.4 MG/DL — SIGNIFICANT CHANGE UP (ref 8.5–10.1)
CHLORIDE SERPL-SCNC: 111 MMOL/L — HIGH (ref 96–108)
CO2 SERPL-SCNC: 25 MMOL/L — SIGNIFICANT CHANGE UP (ref 22–31)
CREAT SERPL-MCNC: 1.45 MG/DL — HIGH (ref 0.5–1.3)
EGFR: 57 ML/MIN/1.73M2 — LOW
EOSINOPHIL # BLD AUTO: 0.05 K/UL — SIGNIFICANT CHANGE UP (ref 0–0.5)
EOSINOPHIL NFR BLD AUTO: 0.5 % — SIGNIFICANT CHANGE UP (ref 0–6)
FLUAV AG NPH QL: SIGNIFICANT CHANGE UP
FLUBV AG NPH QL: SIGNIFICANT CHANGE UP
GLUCOSE BLDC GLUCOMTR-MCNC: 184 MG/DL — HIGH (ref 70–99)
GLUCOSE SERPL-MCNC: 194 MG/DL — HIGH (ref 70–99)
HCT VFR BLD CALC: 43.6 % — SIGNIFICANT CHANGE UP (ref 39–50)
HGB BLD-MCNC: 13.8 G/DL — SIGNIFICANT CHANGE UP (ref 13–17)
IMM GRANULOCYTES NFR BLD AUTO: 0.2 % — SIGNIFICANT CHANGE UP (ref 0–0.9)
INR BLD: 0.96 RATIO — SIGNIFICANT CHANGE UP (ref 0.88–1.16)
LYMPHOCYTES # BLD AUTO: 1.5 K/UL — SIGNIFICANT CHANGE UP (ref 1–3.3)
LYMPHOCYTES # BLD AUTO: 16 % — SIGNIFICANT CHANGE UP (ref 13–44)
MAGNESIUM SERPL-MCNC: 2 MG/DL — SIGNIFICANT CHANGE UP (ref 1.6–2.6)
MCHC RBC-ENTMCNC: 25.7 PG — LOW (ref 27–34)
MCHC RBC-ENTMCNC: 31.7 G/DL — LOW (ref 32–36)
MCV RBC AUTO: 81.2 FL — SIGNIFICANT CHANGE UP (ref 80–100)
MONOCYTES # BLD AUTO: 0.43 K/UL — SIGNIFICANT CHANGE UP (ref 0–0.9)
MONOCYTES NFR BLD AUTO: 4.6 % — SIGNIFICANT CHANGE UP (ref 2–14)
NEUTROPHILS # BLD AUTO: 7.33 K/UL — SIGNIFICANT CHANGE UP (ref 1.8–7.4)
NEUTROPHILS NFR BLD AUTO: 78.4 % — HIGH (ref 43–77)
NRBC # BLD: 0 /100 WBCS — SIGNIFICANT CHANGE UP (ref 0–0)
NT-PROBNP SERPL-SCNC: 458 PG/ML — HIGH (ref 0–125)
PLATELET # BLD AUTO: 163 K/UL — SIGNIFICANT CHANGE UP (ref 150–400)
POTASSIUM SERPL-MCNC: 4 MMOL/L — SIGNIFICANT CHANGE UP (ref 3.5–5.3)
POTASSIUM SERPL-SCNC: 4 MMOL/L — SIGNIFICANT CHANGE UP (ref 3.5–5.3)
PROT SERPL-MCNC: 7.1 GM/DL — SIGNIFICANT CHANGE UP (ref 6–8.3)
PROTHROM AB SERPL-ACNC: 11.5 SEC — SIGNIFICANT CHANGE UP (ref 10.5–13.4)
RBC # BLD: 5.37 M/UL — SIGNIFICANT CHANGE UP (ref 4.2–5.8)
RBC # FLD: 15.2 % — HIGH (ref 10.3–14.5)
SARS-COV-2 RNA SPEC QL NAA+PROBE: SIGNIFICANT CHANGE UP
SODIUM SERPL-SCNC: 144 MMOL/L — SIGNIFICANT CHANGE UP (ref 135–145)
TROPONIN I, HIGH SENSITIVITY RESULT: 27.4 NG/L — SIGNIFICANT CHANGE UP
WBC # BLD: 9.36 K/UL — SIGNIFICANT CHANGE UP (ref 3.8–10.5)
WBC # FLD AUTO: 9.36 K/UL — SIGNIFICANT CHANGE UP (ref 3.8–10.5)

## 2022-12-19 PROCEDURE — 93010 ELECTROCARDIOGRAM REPORT: CPT

## 2022-12-19 PROCEDURE — 71045 X-RAY EXAM CHEST 1 VIEW: CPT | Mod: 26

## 2022-12-19 PROCEDURE — 99285 EMERGENCY DEPT VISIT HI MDM: CPT

## 2022-12-19 PROCEDURE — 99291 CRITICAL CARE FIRST HOUR: CPT

## 2022-12-19 RX ORDER — ATROPINE SULFATE 0.1 MG/ML
1 SYRINGE (ML) INJECTION ONCE
Refills: 0 | Status: COMPLETED | OUTPATIENT
Start: 2022-12-19 | End: 2022-12-19

## 2022-12-19 RX ADMIN — Medication 1 MILLIGRAM(S): at 18:14

## 2022-12-19 RX ADMIN — Medication 1 MILLIGRAM(S): at 18:21

## 2022-12-19 NOTE — ED PROVIDER NOTE - PHYSICAL EXAMINATION
GENERAL: Awake, alert, NAD  HEENT: NC/AT, moist mucous membranes, PERRL, EOMI  LUNGS: CTAB, no wheezes or crackles   CARDIAC: bradycardic, no m/r/g  ABDOMEN: Soft, non tender, non distended, no rebound, no guarding  BACK: No midline spinal tenderness, no CVA tenderness  EXT: No edema, no calf tenderness, 2+ DP pulses bilaterally, no deformities.  NEURO: A&Ox3. Moving all extremities.  SKIN: Warm and dry. No rash.  PSYCH: Normal affect.

## 2022-12-19 NOTE — ED PROVIDER NOTE - OBJECTIVE STATEMENT
56yo M w/ history of prior CVA w/ mild left sided deficits (on aspirin 325), HTN, NIDDM and HLD coming in w/ generalized weakness x 6 hours associated w/ dizziness and one episode of nbnb emesis. Not chest pain or SOB and has otherwise been in his normal state of health. Found to be bradycardic to the 30s-40s.

## 2022-12-19 NOTE — ED PROVIDER NOTE - NSICDXPASTMEDICALHX_GEN_ALL_CORE_FT
PAST MEDICAL HISTORY:  CVA (cerebral vascular accident)     DM (diabetes mellitus)     HTN (hypertension)     HTN (hypertension)

## 2022-12-19 NOTE — ED PROVIDER NOTE - CLINICAL SUMMARY MEDICAL DECISION MAKING FREE TEXT BOX
56 Y/o male pmhx CAD, HLD, CVA (2012 with residual left sided weakness) p/w chief complaint of dizziness. Patient denies - SOB endorses nausea, vomiting and dizziness. Vitals - bradycardic. Physical exam - unremarkable besides bradycardia. EKG - mobitz type II block.     Plan to get basic labs and cardiac work up, patient will have pacers placed. Will get cardiology consult.

## 2022-12-19 NOTE — ED PROVIDER NOTE - CLINICAL SUMMARY MEDICAL DECISION MAKING FREE TEXT BOX
58yo M w/ history of prior CVA w/ mild left sided deficits (on aspirin 325), HTN, NIDDM and HLD coming in w/ symptomatic bradycardia w/ 2:1 AV block; will provide atropine and reassess; will require transfer likely for pacemaker

## 2022-12-19 NOTE — ED PROVIDER NOTE - NSICDXPASTMEDICALHX_GEN_ALL_CORE_FT
PAST MEDICAL HISTORY:  CVA (cerebrovascular accident)     DM (diabetes mellitus)     HTN (hypertension)

## 2022-12-19 NOTE — ED ADULT NURSE REASSESSMENT NOTE - NS ED NURSE REASSESS COMMENT FT1
Report received from MEGGAN wayne. Safety checks completed this shift. Safety rounds completed hourly.  IV sites checked Q2+remains WDL.  Fall & skin precautions in place. pt on cardiac monitor. pending transfer to [place].

## 2022-12-19 NOTE — ED PROVIDER NOTE - ATTENDING CONTRIBUTION TO CARE
MD Acevedo:  patient seen and evaluated personally.   I agree with the History & Physical,  Impression & Plan other than what was detailed in my note.  MD Acevedo  56 y/o m hx of cva, left sided weakness, ht, dm, presenting to ed from  for bradycardia. pt had n/v, vertigo weakness started at 11 am, intermittent, no ha, bv, no cp, sob, no unilat weakness, falls, no slurred speech, no leg swelling, has not known to have low hr, afebrile hr in 30's, bp stable 140,  non toxic well appearing, NC/AT,  conjunctiva non conjected, sclera anicteric, moist mucous membranes, neck supple, heart sounds slow otherwise normal, no mrg, lungs cta b/l no wrr, abd soft non distended w/ no tenderness, no visual deformities of extremities, axox3,  normal mood and affect, well appearing however pads on pt, repeat ekg, labs, cbc, cmp, trop, tsh, mag, consult cardiology.

## 2022-12-19 NOTE — ED PROVIDER NOTE - PROGRESS NOTE DETAILS
Attending Kristina:  cardiology consulted, repeat ekg shows bradycardia likely second degree heart block type two vs third degree, pads on pt, currently hd stable Dorothy Green, PGY-1 DO:   CTA concerning for CT HEAD: No acute intracranial hemorrhage, midline shift, or hydrocephalus. Chronic small vessel ischemic changes, including chronic appearing small right cerebellar fracture.  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 Further workup with MRA may be helpful in the evaluation for dissection. Multifocal severe narrowing of the left V4 segment.  Atherosclerotic changes with moderate stenosis of the right carotid siphon and mild stenosis of the left carotid siphon.  CTA NECK: Severe atherosclerotic narrowing at the origin of the left vertebral artery and mild to moderate narrowing involving both vertebral artery V2 segments. Noncalcified plaque with less than 50% stenosis at the right proximal cervical ICA by NASCET criteria. Attending Kristina:  neurology was paged earlier given abnormal findings

## 2022-12-19 NOTE — ED PROVIDER NOTE - OBJECTIVE STATEMENT
58 Y/o male pmhx CAD, HLD, CVA (2012 with residual left sided weakness) p/w chief complaint of dizziness. Patient states that he woke up this morning and was feeling dizzy, felt like he had to hold on to the wall and his wife to walk around. States that he has never had these symptoms before but endorses passing out about 2 weeks ago. Did no present to hospital for symptoms. Denies any pain. Patient endorsed he had stress 2 months ago that was unremarkable. Denies SOB.   ROS +N, V, dizziness 56 Y/o male pmhx CAD, HLD, CVA (2012 with residual left sided weakness) p/w chief complaint of dizziness. Patient states that he woke up this morning and was feeling dizzy, felt like he had to hold on to the wall and his wife to walk around. States that he has never had these symptoms before but endorses passing out about 2 weeks ago. Did not present to hospital for symptoms of syncope. Denies any pain. Patient endorsed he had stress 2 months ago that was unremarkable. Denies SOB.   ROS +N, V, dizziness

## 2022-12-19 NOTE — ED PROVIDER NOTE - PHYSICAL EXAMINATION
GENERAL: well appearing in no acute distress, non-toxic appearing  HEAD: normocephalic, atraumatic  HENT: airway intact  EYES: normal conjunctiva  CARDIAC: bradycardic;, normal S1S2, no appreciable murmurs, 2+ pulses in UE/LE b/l  PULM: normal breath sounds, clear to ascultation bilaterally, no rales, rhonchi, wheezing  GI: abdomen nondistended, soft, nontender, no guarding, rebound tenderness  NEURO: no focal motor or sensory deficits  MSK: no peripheral edema  SKIN: well-perfused, extremities warm, no visible rashes

## 2022-12-19 NOTE — ED PROVIDER NOTE - ATTENDING CONTRIBUTION TO CARE
Dichter: Pt seen w/ PGY3 EM resident - 57M PMH DM, CVA w/ residual L deficits pw weakness / dizziness onset 12P today. Pt found to be bradycardic on ED arrival to high 30s / 40s. ECG w/ Mobitz II block, 2:1 conduction. Pt given Atropine x2 w/o change. Placed on pacer pads. Pt in NAD, mentating well. Denies CP. EP consulted. Dichter: Pt seen w/ PGY3 EM resident - 57M PMH DM, CVA w/ mild residual L deficits pw weakness / dizziness onset 12P today (6hrs PTA). No syncope. Pt found to be bradycardic on ED arrival to high 30s / 40s. ECG w/ 2nd degree block, 2:1 conduction. Pt given Atropine x3 w/o change. Placed on pacer pads. Pt in NAD, mentating well. Denies CP. EP consulted, accept as transfer to [facility]. Pt, family updated to results, transfer. They understand / agree w/ this plan.

## 2022-12-20 DIAGNOSIS — E11.9 TYPE 2 DIABETES MELLITUS WITHOUT COMPLICATIONS: ICD-10-CM

## 2022-12-20 DIAGNOSIS — R42 DIZZINESS AND GIDDINESS: ICD-10-CM

## 2022-12-20 DIAGNOSIS — I44.1 ATRIOVENTRICULAR BLOCK, SECOND DEGREE: ICD-10-CM

## 2022-12-20 DIAGNOSIS — Z29.9 ENCOUNTER FOR PROPHYLACTIC MEASURES, UNSPECIFIED: ICD-10-CM

## 2022-12-20 DIAGNOSIS — I10 ESSENTIAL (PRIMARY) HYPERTENSION: ICD-10-CM

## 2022-12-20 DIAGNOSIS — I25.10 ATHEROSCLEROTIC HEART DISEASE OF NATIVE CORONARY ARTERY WITHOUT ANGINA PECTORIS: ICD-10-CM

## 2022-12-20 LAB
A1C WITH ESTIMATED AVERAGE GLUCOSE RESULT: 6.3 % — HIGH (ref 4–5.6)
ALBUMIN SERPL ELPH-MCNC: 3.8 G/DL — SIGNIFICANT CHANGE UP (ref 3.3–5)
ALP SERPL-CCNC: 50 U/L — SIGNIFICANT CHANGE UP (ref 40–120)
ALT FLD-CCNC: 17 U/L — SIGNIFICANT CHANGE UP (ref 10–45)
ANION GAP SERPL CALC-SCNC: 13 MMOL/L — SIGNIFICANT CHANGE UP (ref 5–17)
AST SERPL-CCNC: 16 U/L — SIGNIFICANT CHANGE UP (ref 10–40)
BILIRUB SERPL-MCNC: 0.4 MG/DL — SIGNIFICANT CHANGE UP (ref 0.2–1.2)
BUN SERPL-MCNC: 29 MG/DL — HIGH (ref 7–23)
CALCIUM SERPL-MCNC: 8.9 MG/DL — SIGNIFICANT CHANGE UP (ref 8.4–10.5)
CHLORIDE SERPL-SCNC: 107 MMOL/L — SIGNIFICANT CHANGE UP (ref 96–108)
CHOLEST SERPL-MCNC: 190 MG/DL — SIGNIFICANT CHANGE UP
CO2 SERPL-SCNC: 22 MMOL/L — SIGNIFICANT CHANGE UP (ref 22–31)
CREAT SERPL-MCNC: 1.36 MG/DL — HIGH (ref 0.5–1.3)
EGFR: 61 ML/MIN/1.73M2 — SIGNIFICANT CHANGE UP
ESTIMATED AVERAGE GLUCOSE: 134 MG/DL — HIGH (ref 68–114)
GLUCOSE BLDC GLUCOMTR-MCNC: 248 MG/DL — HIGH (ref 70–99)
GLUCOSE BLDC GLUCOMTR-MCNC: 289 MG/DL — HIGH (ref 70–99)
GLUCOSE SERPL-MCNC: 261 MG/DL — HIGH (ref 70–99)
HCT VFR BLD CALC: 39.4 % — SIGNIFICANT CHANGE UP (ref 39–50)
HDLC SERPL-MCNC: 53 MG/DL — SIGNIFICANT CHANGE UP
HGB BLD-MCNC: 12.7 G/DL — LOW (ref 13–17)
LIPID PNL WITH DIRECT LDL SERPL: 110 MG/DL — HIGH
MAGNESIUM SERPL-MCNC: 1.9 MG/DL — SIGNIFICANT CHANGE UP (ref 1.6–2.6)
MAGNESIUM SERPL-MCNC: 2 MG/DL — SIGNIFICANT CHANGE UP (ref 1.6–2.6)
MCHC RBC-ENTMCNC: 26 PG — LOW (ref 27–34)
MCHC RBC-ENTMCNC: 32.2 GM/DL — SIGNIFICANT CHANGE UP (ref 32–36)
MCV RBC AUTO: 80.7 FL — SIGNIFICANT CHANGE UP (ref 80–100)
NON HDL CHOLESTEROL: 137 MG/DL — HIGH
NRBC # BLD: 0 /100 WBCS — SIGNIFICANT CHANGE UP (ref 0–0)
PHOSPHATE SERPL-MCNC: 2.9 MG/DL — SIGNIFICANT CHANGE UP (ref 2.5–4.5)
PLATELET # BLD AUTO: 153 K/UL — SIGNIFICANT CHANGE UP (ref 150–400)
POTASSIUM SERPL-MCNC: 3.2 MMOL/L — LOW (ref 3.5–5.3)
POTASSIUM SERPL-SCNC: 3.2 MMOL/L — LOW (ref 3.5–5.3)
PROT SERPL-MCNC: 6.4 G/DL — SIGNIFICANT CHANGE UP (ref 6–8.3)
RBC # BLD: 4.88 M/UL — SIGNIFICANT CHANGE UP (ref 4.2–5.8)
RBC # FLD: 15.2 % — HIGH (ref 10.3–14.5)
SARS-COV-2 RNA SPEC QL NAA+PROBE: SIGNIFICANT CHANGE UP
SODIUM SERPL-SCNC: 142 MMOL/L — SIGNIFICANT CHANGE UP (ref 135–145)
TRIGL SERPL-MCNC: 132 MG/DL — SIGNIFICANT CHANGE UP
TROPONIN T, HIGH SENSITIVITY RESULT: 37 NG/L — SIGNIFICANT CHANGE UP (ref 0–51)
TROPONIN T, HIGH SENSITIVITY RESULT: 42 NG/L — SIGNIFICANT CHANGE UP (ref 0–51)
TSH SERPL-MCNC: 0.6 UIU/ML — SIGNIFICANT CHANGE UP (ref 0.27–4.2)
WBC # BLD: 9.37 K/UL — SIGNIFICANT CHANGE UP (ref 3.8–10.5)
WBC # FLD AUTO: 9.37 K/UL — SIGNIFICANT CHANGE UP (ref 3.8–10.5)

## 2022-12-20 PROCEDURE — 70496 CT ANGIOGRAPHY HEAD: CPT | Mod: 26,MA

## 2022-12-20 PROCEDURE — 71250 CT THORAX DX C-: CPT | Mod: 26

## 2022-12-20 PROCEDURE — 99223 1ST HOSP IP/OBS HIGH 75: CPT | Mod: GC

## 2022-12-20 PROCEDURE — 93306 TTE W/DOPPLER COMPLETE: CPT | Mod: 26

## 2022-12-20 PROCEDURE — 70498 CT ANGIOGRAPHY NECK: CPT | Mod: 26,MA

## 2022-12-20 PROCEDURE — 99223 1ST HOSP IP/OBS HIGH 75: CPT

## 2022-12-20 PROCEDURE — 71045 X-RAY EXAM CHEST 1 VIEW: CPT | Mod: 26

## 2022-12-20 RX ORDER — FINASTERIDE 5 MG/1
5 TABLET, FILM COATED ORAL DAILY
Refills: 0 | Status: DISCONTINUED | OUTPATIENT
Start: 2022-12-20 | End: 2022-12-28

## 2022-12-20 RX ORDER — DEXTROSE 50 % IN WATER 50 %
25 SYRINGE (ML) INTRAVENOUS ONCE
Refills: 0 | Status: DISCONTINUED | OUTPATIENT
Start: 2022-12-20 | End: 2022-12-28

## 2022-12-20 RX ORDER — TAMSULOSIN HYDROCHLORIDE 0.4 MG/1
0.8 CAPSULE ORAL AT BEDTIME
Refills: 0 | Status: DISCONTINUED | OUTPATIENT
Start: 2022-12-20 | End: 2022-12-28

## 2022-12-20 RX ORDER — CLOPIDOGREL BISULFATE 75 MG/1
75 TABLET, FILM COATED ORAL DAILY
Refills: 0 | Status: DISCONTINUED | OUTPATIENT
Start: 2022-12-20 | End: 2022-12-26

## 2022-12-20 RX ORDER — ATROPINE SULFATE 0.1 MG/ML
1 SYRINGE (ML) INJECTION ONCE
Refills: 0 | Status: DISCONTINUED | OUTPATIENT
Start: 2022-12-20 | End: 2022-12-28

## 2022-12-20 RX ORDER — ATROPINE SULFATE 0.1 MG/ML
1 SYRINGE (ML) INJECTION ONCE
Refills: 0 | Status: DISCONTINUED | OUTPATIENT
Start: 2022-12-20 | End: 2022-12-20

## 2022-12-20 RX ORDER — GLUCAGON INJECTION, SOLUTION 0.5 MG/.1ML
1 INJECTION, SOLUTION SUBCUTANEOUS ONCE
Refills: 0 | Status: DISCONTINUED | OUTPATIENT
Start: 2022-12-20 | End: 2022-12-28

## 2022-12-20 RX ORDER — ACETAMINOPHEN 500 MG
650 TABLET ORAL EVERY 6 HOURS
Refills: 0 | Status: DISCONTINUED | OUTPATIENT
Start: 2022-12-20 | End: 2022-12-28

## 2022-12-20 RX ORDER — POTASSIUM CHLORIDE 20 MEQ
40 PACKET (EA) ORAL ONCE
Refills: 0 | Status: COMPLETED | OUTPATIENT
Start: 2022-12-20 | End: 2022-12-20

## 2022-12-20 RX ORDER — INSULIN LISPRO 100/ML
VIAL (ML) SUBCUTANEOUS
Refills: 0 | Status: DISCONTINUED | OUTPATIENT
Start: 2022-12-20 | End: 2022-12-22

## 2022-12-20 RX ORDER — AMLODIPINE BESYLATE 2.5 MG/1
10 TABLET ORAL ONCE
Refills: 0 | Status: COMPLETED | OUTPATIENT
Start: 2022-12-20 | End: 2022-12-20

## 2022-12-20 RX ORDER — DEXTROSE 50 % IN WATER 50 %
15 SYRINGE (ML) INTRAVENOUS ONCE
Refills: 0 | Status: DISCONTINUED | OUTPATIENT
Start: 2022-12-20 | End: 2022-12-28

## 2022-12-20 RX ORDER — ONDANSETRON 8 MG/1
4 TABLET, FILM COATED ORAL EVERY 8 HOURS
Refills: 0 | Status: DISCONTINUED | OUTPATIENT
Start: 2022-12-20 | End: 2022-12-28

## 2022-12-20 RX ORDER — CHLORHEXIDINE GLUCONATE 213 G/1000ML
1 SOLUTION TOPICAL
Refills: 0 | Status: COMPLETED | OUTPATIENT
Start: 2022-12-20 | End: 2022-12-21

## 2022-12-20 RX ORDER — INSULIN LISPRO 100/ML
VIAL (ML) SUBCUTANEOUS AT BEDTIME
Refills: 0 | Status: DISCONTINUED | OUTPATIENT
Start: 2022-12-20 | End: 2022-12-22

## 2022-12-20 RX ORDER — INFLUENZA VIRUS VACCINE 15; 15; 15; 15 UG/.5ML; UG/.5ML; UG/.5ML; UG/.5ML
0.5 SUSPENSION INTRAMUSCULAR ONCE
Refills: 0 | Status: DISCONTINUED | OUTPATIENT
Start: 2022-12-20 | End: 2022-12-28

## 2022-12-20 RX ORDER — ATORVASTATIN CALCIUM 80 MG/1
80 TABLET, FILM COATED ORAL AT BEDTIME
Refills: 0 | Status: DISCONTINUED | OUTPATIENT
Start: 2022-12-20 | End: 2022-12-28

## 2022-12-20 RX ORDER — ASPIRIN/CALCIUM CARB/MAGNESIUM 324 MG
325 TABLET ORAL DAILY
Refills: 0 | Status: DISCONTINUED | OUTPATIENT
Start: 2022-12-20 | End: 2022-12-21

## 2022-12-20 RX ORDER — DEXTROSE 50 % IN WATER 50 %
12.5 SYRINGE (ML) INTRAVENOUS ONCE
Refills: 0 | Status: DISCONTINUED | OUTPATIENT
Start: 2022-12-20 | End: 2022-12-28

## 2022-12-20 RX ORDER — LANOLIN ALCOHOL/MO/W.PET/CERES
3 CREAM (GRAM) TOPICAL AT BEDTIME
Refills: 0 | Status: DISCONTINUED | OUTPATIENT
Start: 2022-12-20 | End: 2022-12-28

## 2022-12-20 RX ADMIN — FINASTERIDE 5 MILLIGRAM(S): 5 TABLET, FILM COATED ORAL at 12:40

## 2022-12-20 RX ADMIN — Medication 2: at 18:22

## 2022-12-20 RX ADMIN — ATORVASTATIN CALCIUM 80 MILLIGRAM(S): 80 TABLET, FILM COATED ORAL at 22:24

## 2022-12-20 RX ADMIN — Medication 325 MILLIGRAM(S): at 12:44

## 2022-12-20 RX ADMIN — TAMSULOSIN HYDROCHLORIDE 0.8 MILLIGRAM(S): 0.4 CAPSULE ORAL at 22:24

## 2022-12-20 RX ADMIN — AMLODIPINE BESYLATE 10 MILLIGRAM(S): 2.5 TABLET ORAL at 23:29

## 2022-12-20 RX ADMIN — CHLORHEXIDINE GLUCONATE 1 APPLICATION(S): 213 SOLUTION TOPICAL at 22:35

## 2022-12-20 RX ADMIN — Medication 40 MILLIEQUIVALENT(S): at 10:57

## 2022-12-20 RX ADMIN — Medication 1: at 22:24

## 2022-12-20 NOTE — H&P ADULT - PROBLEM SELECTOR PLAN 1
-EKG showed 3:1, Mobitz type II AV block. Pt was given Atropine x3, with HRs remaining in the 40s.  -no prior history   -TSH wnl  -TTE  -cardiac MRI  -ep following, appreciate recs  -possible pacemaker placement

## 2022-12-20 NOTE — CONSULT NOTE ADULT - SUBJECTIVE AND OBJECTIVE BOX
Neurology Consult    BRANDEN MARRUFO  57y Male  MRN-[id number]      HPI:  57 year-old right-handed man with a PMH of HTN, HLD, NIDDM2, prior stroke in 2012 (with residual left sided ?numbness) who initially presented to [facility] on 12/19/22 with c/o generalized weakness, dizziness and vomiting noted upon waking up in the AM, was found to be bradycardic to the 30s-40s with EKG concerning for 3:1 Mobitz type II AV block, transferred to [facility] for further cardiac evaluation and management. In the ED CTA neck was done and reported to demonstrate severe narrowing at the origin of the left vertebral artery and mild to moderate narrowing of both vertebral artery V2 segments, while CTA head was reported to show complete occlusion of the right distal V3 segment, multifocal occlusions with segmental opacification of the right V4 segment extending to the basilar confluence, and multifocal severe narrowing of the left V4 segment, for which Neurology was consulted. On evaluation patient reports waking up at approximately 11:00AM feeling dizzy, described as room-spinning. There was associated nausea and two episodes of emesis at home, after which he presented to the OSH. There was no associated headache, diplopia, tinnitus, dysarthria, changes in sensation, weakness of the extremities or loss of consciousness. Approximately 3 weeks prior, he reports two brief episodes of loss of consciousness. However at that time, there was concern he may have had food poisoning as he was also noted to have vomiting and diarrhea surrounding the events. Per wife, the episodes were brief, lasting seconds, after which he returned to baseline. He did not present to the hospital at that time. He otherwise does not report recent fever, chills, illness or injury. He takes aspirin 325mg daily. At baseline he ambulates without assistance, though he reports intermittent instability at times due to his prior stroke, and is independent with ADLs. At this time he is still experiencing some dizziness, now described as lightheadedness, which he states is improved from prior.       A 10-system ROS was performed and is negative except as noted above and/or in the HPI.      PAST MEDICAL & SURGICAL HISTORY:  CVA (cerebrovascular accident)      HTN (hypertension)      DM (diabetes mellitus)        FAMILY HISTORY:    As stated in HPI, unless otherwise noted above.     SOCIAL HISTORY:   As stated in HPI, unless otherwise noted above.       MEDICATIONS    Home Medications:    MEDICATIONS  (STANDING):    MEDICATIONS  (PRN):      Allergies  No Known Allergies      VITALS & EXAMINATION    Height (cm): 177.8 (12-19 @ 22:57)  Weight (kg): 77.1 (12-19 @ 22:57)  BMI (kg/m2): 24.4 (12-19 @ 22:57)    Vital Signs Last 24 Hrs  T(C): 37.3 (20 Dec 2022 05:29), Max: 37.3 (20 Dec 2022 05:29)  T(F): 99.1 (20 Dec 2022 05:29), Max: 99.1 (20 Dec 2022 05:29)  HR: 37 (20 Dec 2022 05:29) (35 - 37)  BP: 151/59 (20 Dec 2022 05:29) (142/62 - 151/59)  BP(mean): 83 (20 Dec 2022 05:29) (83 - 83)  RR: 16 (20 Dec 2022 05:29) (16 - 16)  SpO2: 100% (20 Dec 2022 05:29) (98% - 100%)    Parameters below as of 20 Dec 2022 05:29  Patient On (Oxygen Delivery Method): room air    *INCOMPLETE    LABS:    CBC   Chem   Mg     2.0     12-19      Coags   LFTs   Lipid Panel   A1c   Cardiac enzymes     U/A   CSF CSF:        STUDIES & IMAGING    Studies (EKG, EEG, EMG, etc):       Radiology (XR, CT, MR, U/S, TTE/EMILEE):  CT Head No Cont:  (20 Dec 2022 01:26)    CT HEAD: No acute intracranial hemorrhage, midline shift, or hydrocephalus. Chronic small vessel ischemic changes, including chronic appearing small right cerebellar infarct    CTA BRAIN: Complete occlusion of the right distal V3 vertebral artery segment and multifocal occlusions with segmental opacification of the right intracranial V4 segment extending to the basilar confluence, the chronicity of which is unknown. Further workup with MRA may be helpful in the evaluation for dissection. Multifocal severe narrowing of the left V4 segment.  Atherosclerotic changes with moderate stenosis of the right carotid siphon and mild stenosis of the left carotid siphon.    CTA NECK: Severe atherosclerotic narrowing at the origin of the left vertebral artery and mild to moderate narrowing involving both vertebral artery V2 segments. Noncalcified plaque with less than 50% stenosis at the right proximal cervical ICA by NASCET criteria.  Neurology Consult    BRANDEN MARRUFO  57y Male  MRN-[id number]      HPI:  57 year-old right-handed man with a PMH of HTN, HLD, NIDDM2, prior stroke in 2012 (with residual left sided ?numbness) who initially presented to [facility] on 12/19/22 with c/o generalized weakness, dizziness and vomiting noted upon waking up in the AM, was found to be bradycardic to the 30s-40s with EKG concerning for 3:1 Mobitz type II AV block, transferred to [facility] for further cardiac evaluation and management. In the ED CTA neck was done and reported to demonstrate severe narrowing at the origin of the left vertebral artery and mild to moderate narrowing of both vertebral artery V2 segments, while CTA head was reported to show complete occlusion of the right distal V3 segment, multifocal occlusions with segmental opacification of the right V4 segment extending to the basilar confluence, and multifocal severe narrowing of the left V4 segment, for which Neurology was consulted. On evaluation patient reports waking up at approximately 11:00AM feeling dizzy, described as room-spinning. There was associated nausea and two episodes of emesis at home, after which he presented to the OSH. There was no associated headache, diplopia, tinnitus, dysarthria, changes in sensation, weakness of the extremities or loss of consciousness. Approximately 3 weeks prior, he reports two brief episodes of loss of consciousness. However at that time, there was concern he may have had food poisoning as he was also noted to have vomiting and diarrhea surrounding the events. Per wife, the episodes were brief, lasting seconds, after which he returned to baseline. He did not present to the hospital at that time. He otherwise does not report recent fever, chills, illness or injury. He takes aspirin 325mg daily. At baseline he ambulates without assistance, though he reports intermittent instability at times due to his prior stroke, and is independent with ADLs. At this time he is still experiencing some dizziness, now described as lightheadedness, which he states is improved from prior.       A 10-system ROS was performed and is negative except as noted above and/or in the HPI.      PAST MEDICAL & SURGICAL HISTORY:  CVA (cerebrovascular accident)      HTN (hypertension)      DM (diabetes mellitus)        FAMILY HISTORY:  No reported history.      SOCIAL HISTORY:   , lives with wife.       MEDICATIONS    Home Medications:    MEDICATIONS  (STANDING):    MEDICATIONS  (PRN):      Allergies  No Known Allergies      VITALS & EXAMINATION    Height (cm): 177.8 (12-19 @ 22:57)  Weight (kg): 77.1 (12-19 @ 22:57)  BMI (kg/m2): 24.4 (12-19 @ 22:57)    Vital Signs Last 24 Hrs  T(C): 37.3 (20 Dec 2022 05:29), Max: 37.3 (20 Dec 2022 05:29)  T(F): 99.1 (20 Dec 2022 05:29), Max: 99.1 (20 Dec 2022 05:29)  HR: 37 (20 Dec 2022 05:29) (35 - 37)  BP: 151/59 (20 Dec 2022 05:29) (142/62 - 151/59)  BP(mean): 83 (20 Dec 2022 05:29) (83 - 83)  RR: 16 (20 Dec 2022 05:29) (16 - 16)  SpO2: 100% (20 Dec 2022 05:29) (98% - 100%)    Parameters below as of 20 Dec 2022 05:29  Patient On (Oxygen Delivery Method): room air    General:  Constitutional: M, appears slightly older than stated age, in no apparent distress including pain.  Head: Normocephalic & atraumatic.  Respiratory: Breathing comfortably on room air.  Extremities: No cyanosis, clubbing, or edema.    Neurological (>12):  MS: Eyes open, slightly drowsy but arouseable, oriented to person, place, situation, time. Normal affect. Follows all commands.    Speech/Language: Clear, fluent with good repetition. Naming intact.    CNs: Pupils reactive OU (3mm OD, 3.5mm OS). Blink to threat intact b/l. EOMI, no nystagmus, no diplopia. V1-3 intact to LT b/l, well developed masseter muscles b/l. No facial asymmetry b/l, full eye closure strength b/l. Hearing grossly normal (rubbing fingers) b/l. Symmetric palate elevation in midline, no uvula deviation. Gag reflex deferred. Head turning & shoulder shrug intact b/l. Tongue midline, normal movements.    Motor: Muscle bulk and tone are normal. There is no pronator drift.     RUE/LUE: 5/5 shoulder abductors, elbow flexors/extensors, wrist flexors/extensors, and finger .      RLE/LLE: 5/5 hip flexors, knee flexors/extensors, ankle dorsiflexors and plantarflexors.    Sensation: Per patient, minimally decreased to pinprick in the LUE/LLE (99%) compared to the RUE/RLE (100%).    Cortical: No hemineglect, no extinction to double sided stimulation (tactile).    Reflexes: 2+ biceps and brachioradialis b/l. 1+ patellars and achilles b/l. Plantar response flexor b/l.     Coordination: Intact rapid-alt movements. Fast finger tapping with normal amplitude and speed. No dysmetria to FTN/HTS b/l.    Gait: Not assessed due to risk of fall (patient reporting lightheadedness at time of evaluation).         STUDIES & IMAGING    CT Head No Cont:  (20 Dec 2022 01:26)  No acute intracranial hemorrhage, midline shift, or hydrocephalus. Chronic small vessel ischemic changes, including chronic appearing small right cerebellar infarct.      CT Angio Head & Neck w/ IV Cont (12.20.22 @ 01:26)    CTA BRAIN: Complete occlusion of the right distal V3 vertebral artery segment and multifocal occlusions with segmental opacification of the right intracranial V4 segment extending to the basilar confluence, the chronicity of which is unknown. Further workup with MRA may be helpful in the evaluation for dissection. Multifocal severe narrowing of the left V4 segment.  Atherosclerotic changes with moderate stenosis of the right carotid siphon and mild stenosis of the left carotid siphon.    CTA NECK: Severe atherosclerotic narrowing at the origin of the left vertebral artery and mild to moderate narrowing involving both vertebral artery V2 segments. Noncalcified plaque with less than 50% stenosis at the right proximal cervical ICA by NASCET criteria.

## 2022-12-20 NOTE — H&P ADULT - NSHPLABSRESULTS_GEN_ALL_CORE
LABS:                          12.7   9.37  )-----------( 153      ( 20 Dec 2022 08:16 )             39.4     12-20    142  |  107  |  29<H>  ----------------------------<  261<H>  3.2<L>   |  22  |  1.36<H>    Ca    8.9      20 Dec 2022 08:16  Phos  2.9     12-20  Mg     1.9     12-20    TPro  6.4  /  Alb  3.8  /  TBili  0.4  /  DBili  x   /  AST  16  /  ALT  17  /  AlkPhos  50  12-20    LIVER FUNCTIONS - ( 20 Dec 2022 08:16 )  Alb: 3.8 g/dL / Pro: 6.4 g/dL / ALK PHOS: 50 U/L / ALT: 17 U/L / AST: 16 U/L / GGT: x

## 2022-12-20 NOTE — H&P ADULT - NSHPPHYSICALEXAM_GEN_ALL_CORE
Vital Signs Last 24 Hrs  T(C): 37 (20 Dec 2022 11:50), Max: 37.3 (20 Dec 2022 05:29)  T(F): 98.6 (20 Dec 2022 11:50), Max: 99.1 (20 Dec 2022 05:29)  HR: 34 (20 Dec 2022 11:50) (34 - 38)  BP: 137/69 (20 Dec 2022 11:50) (137/69 - 162/70)  BP(mean): 83 (20 Dec 2022 05:29) (83 - 83)  RR: 17 (20 Dec 2022 11:50) (16 - 18)  SpO2: 99% (20 Dec 2022 11:50) (97% - 100%)    Parameters below as of 20 Dec 2022 11:50  Patient On (Oxygen Delivery Method): room air        CONSTITUTIONAL: Well-groomed, in no apparent distress  EYES: No conjunctival or scleral injection, non-icteric;   ENMT: No external nasal lesions; MMM  NECK: Trachea midline without palpable neck mass; thyroid not enlarged and non-tender  RESPIRATORY: Breathing comfortably; no dullness to percussion; lungs CTA without wheeze/rhonchi/rales  CARDIOVASCULAR: bradycardic but regular rhythm, +S1S2, no M/G/R; pedal pulses full and symmetric; no lower extremity edema  GASTROINTESTINAL: No palpable masses or tenderness, +BS throughout, no rebound/guarding; no hepatosplenomegaly; no hernia palpated  LYMPHATIC: No cervical LAD or tenderness  SKIN: No rashes or ulcers noted  NEUROLOGIC: no focal deficits   PSYCHIATRIC: A+O x 3; mood and affect appropriate; appropriate insight and judgment

## 2022-12-20 NOTE — CONSULT NOTE ADULT - ASSESSMENT
*INCOMPLETE    57 year-old right-handed man with a PMH of HTN, HLD, NIDDM2, prior stroke in 2012 (with residual left sided ?numbness) who initially presented to [facility] on 12/19/22 with c/o generalized weakness, dizziness and vomiting noted upon waking up in the AM, was found to be bradycardic to the 30s-40s with EKG concerning for 3:1 Mobitz type II AV block, transferred to [facility] for further cardiac evaluation and management. In the ED CTA neck was done and reported to demonstrate severe narrowing at the origin of the left vertebral artery and mild to moderate narrowing of both vertebral artery V2 segments, while CTA head was reported to show complete occlusion of the right distal V3 segment, multifocal occlusions with segmental opacification of the right V4 segment extending to the basilar confluence, and multifocal severe narrowing of the left V4 segment, for which Neurology was consulted.  VS notable for HR 35/min. Exam with ***INCOMPLETE    Acute onset dizziness/lightheadedness, nausea, vomiting, likely secondary to severe bradycardia from Mobitz type II AV block. Will evaluate for superimposed ischemic event from possible hypoperfusion given persistent bradycardia in the setting of severe intracranial atherosclerotic disease. *INCOMPLETE    Recommendations:   [] MRI brain w/o contrast  [] MRA NOVA  [] Continue home aspirin 325mg daily  [] Start plavix 75mg daily for now, can stop if MRI (-)  [] If stroke on MRI, pt will be a candidate for CAPTIVA trial  [] Workup and management of Mobitz type II AV block as per EP/Cardiology  [] TTE    Miscellaneous:   [] Telemonitoring, Neurochecks/VS per unit protocol  [] Keep BP permissive up to 220/120 (or as permissive as allowed by Cardiology) for first 24-48 hours after symptom onset  [] Fall precautions  [] PT/OT    Discussed with stroke fellow.     *INCOMPLETE 57 year-old right-handed man with a PMH of HTN, HLD, NIDDM2, prior stroke in 2012 (with residual left sided ?numbness) who initially presented to [facility] on 12/19/22 with c/o generalized weakness, dizziness and vomiting noted upon waking up in the AM, was found to be bradycardic to the 30s-40s with EKG concerning for 3:1 Mobitz type II AV block, transferred to [facility] for further cardiac evaluation and management. In the ED CTA neck was done and reported to demonstrate severe narrowing at the origin of the left vertebral artery and mild to moderate narrowing of both vertebral artery V2 segments, while CTA head was reported to show complete occlusion of the right distal V3 segment, multifocal occlusions with segmental opacification of the right V4 segment extending to the basilar confluence, and multifocal severe narrowing of the left V4 segment, for which Neurology was consulted. VS notable for HR 35/min. On exam patient reported minimally decreased sensation to pinprick in the LUE/LLE, but otherwise no localizing deficits were noted. CTH w/o reported to show chronic small right cerebellar infarct.    NIHSS: 1    Impression: Acute onset dizziness/lightheadedness, nausea, vomiting, likely secondary to severe bradycardia from Mobitz type II AV block. Will evaluate for superimposed acute ischemic cerebrovascular event given possibility of hypoperfusion from persistent bradycardia in the setting of severe intracranial atherosclerotic disease of the posterior circulation.       Recommendations:   [] MRI brain w/o contrast  [] MRA NOVA  [] Continue home aspirin 325mg daily  [] Start plavix 75mg daily for now, can stop if MRI (-) for acute infarct  [] Continue home atorvastatin 80mg   [] If evidence of acute infarct on MRI, pt will be a candidate for CAPTIVA trial   [] Workup and management of Mobitz type II AV block as per EP/Cardiology  [] TTE    Miscellaneous:   [] Telemonitoring, Neurochecks/VS per unit protocol  [] Keep BP permissive up to 220/120 (or as permissive as allowed by Cardiology) for first 24-48 hours after symptom onset  [] BG goal <180, avoid hypoglycemia  [] NPO unless passes dysphagia screen, otherwise swallow eval  [] Fall precautions  [] PT/OT    Discussed with stroke fellow.

## 2022-12-20 NOTE — H&P ADULT - ASSESSMENT
Patient is a 57 y M with a PMHx of CAD, CVA (2012 with no significant residual weakness), DM, p/w bradycardia. EKG concerning for 3:1, Mobitz type II AV block. Patient pending further cardiac workup, and possible pacemaker placement.

## 2022-12-20 NOTE — CONSULT NOTE ADULT - SUBJECTIVE AND OBJECTIVE BOX
HPI:  The patient is a 58yo male with PMH of prior CVA with mild left sided deficits (on aspirin 325), HTN, DM, vertigo and HLD who initially presented to [place] with generalized weakness associated with dizziness, emesis and head spinning sensation. He states that he felt at his normal state of health last night (12/19 at 11pm) however woke up felling dizzy, needed his wife's assistance to get to the bathroom prompting him to come to the ED. Upon evaluation, pt denies any chest pain, dyspnea, light-headedness, palpitations or syncope. The pt was found to be bradycardic to the 30s-40s with EKG concerning for 3:1, Mobitz type II AV block. Pt was given Atropine x3, with HRs remaining in the 40s however pt apparently reporting symptomatic improvement.  Labs done at  significant for Cr of 1.45, Troponin I 27 and . Upon evaluation at [facility], the pt is hemodynamically stable. He is present with his wife and both report some concern for food poisoning that may have occurred 2 weeks ago during which the pt had symptoms of severe diarrhea and emesis and may have "passed out." Also reports eating large amount of mint daily for the last 15-20 years which he is worried could be contributing to not feeling well.  Currently reporting that his symptoms are significantly better however does have some mild light-headedness.       PMHx:   CVA (cerebral vascular accident)    HTN (hypertension)    DM (diabetes mellitus)    HTN (hypertension)        PSHx:   No significant past surgical history    S/P hernia repair    S/P cystoscopy        Allergies:  No Known Allergies      Social History:  Smoking History: None   Alcohol Use: None   Drug Use: None     REVIEW OF SYSTEMS:  CONSTITUTIONAL: + weakness, no fevers or chills  EYES/ENT: No visual changes;  No dysphagia  NECK: No pain or stiffness  RESPIRATORY: No cough, wheezing, hemoptysis; No shortness of breath  CARDIOVASCULAR: No chest pain or palpitations; No lower extremity edema  GASTROINTESTINAL: No abdominal or epigastric pain. No nausea, vomiting, or hematemesis; No diarrhea or constipation. No melena or hematochezia.  BACK: No back pain  GENITOURINARY: No dysuria, frequency or hematuria  NEUROLOGICAL: No numbness or weakness, + mild dizziness   SKIN: No itching, burning, rashes, or lesions   All other review of systems is negative unless indicated above.    Physical Exam:  T(F): 98.4 (12-19), Max: 98.4 (12-19)  HR: 40 (12-19) (36 - 40)  BP: 130/62 (12-19) (130/62 - 171/58)  RR: 20 (12-19)  SpO2: 96% (12-19)      Appearance: No acute distress; well appearing  Eyes: pink conjunctiva  HEENT: Dry oral mucosa  Cardiovascular: Bradycardic, S1, S2, no murmurs, rubs, or gallops; no edema; no JVD  Respiratory: Clear to auscultation bilaterally  Gastrointestinal: soft, non-tender, non-distended   Musculoskeletal: No clubbing; no joint deformity   Neurologic: Normal speech, no facial asymmetry  Psychiatry: AAOx3, mood & affect appropriate  Skin: No rashes, ecchymoses, or cyanosis of exposed skin                             13.8   9.36  )-----------( 163      ( 19 Dec 2022 19:39 )             43.6     12-19    144  |  111<H>  |  30<H>  ----------------------------<  194<H>  4.0   |  25  |  1.45<H>    Ca    9.4      19 Dec 2022 19:39  Mg     2.0     12-19    TPro  7.1  /  Alb  3.5  /  TBili  0.4  /  DBili  x   /  AST  16  /  ALT  25  /  AlkPhos  54  12-19    PT/INR - ( 19 Dec 2022 19:39 )   PT: 11.5 sec;   INR: 0.96 ratio         PTT - ( 19 Dec 2022 19:39 )  PTT:32.0 sec          Serum Pro-Brain Natriuretic Peptide: 458 pg/mL (12-19 @ 19:39) HPI:  The patient is a 56yo male with PMH of prior CVA with mild left sided deficits (on aspirin 325), HTN, DM, vertigo and HLD who initially presented to [place] with generalized weakness associated with dizziness, emesis and head spinning sensation. He states that he felt at his normal state of health last night (12/19 at 11pm) however woke up felling dizzy, needed his wife's assistance to get to the bathroom prompting him to come to the ED. Upon evaluation, pt denies any chest pain, dyspnea, light-headedness, palpitations or syncope. The pt was found to be bradycardic to the 30s-40s with EKG concerning for 3:1, Mobitz type II AV block. Pt was given Atropine x3, with HRs remaining in the 40s however pt apparently reporting symptomatic improvement.  Labs done at  significant for Cr of 1.45, Troponin I 27 and . Upon evaluation at [facility], the pt is hemodynamically stable. He is present with his wife and both report some concern for food poisoning that may have occurred 2 weeks ago during which the pt had symptoms of severe diarrhea and emesis and may have "passed out." Also reports eating large amount of mint daily for the last 15-20 years which he is worried could be contributing to not feeling well.  Currently reporting that his symptoms are significantly better however does have some mild light-headedness.     PMHx:   CVA (cerebral vascular accident)  HTN (hypertension)  DM (diabetes mellitus)  HTN (hypertension)    PSHx:   No significant past surgical history  S/P hernia repair  S/P cystoscopy    Allergies: No Known Allergies    Social History:   Smoking History: None   Alcohol Use: None   Drug Use: None     REVIEW OF SYSTEMS:  CONSTITUTIONAL: + weakness, no fevers or chills  EYES/ENT: No visual changes;  No dysphagia  NECK: No pain or stiffness  RESPIRATORY: No cough, wheezing, hemoptysis; No shortness of breath  CARDIOVASCULAR: No chest pain or palpitations; No lower extremity edema  GASTROINTESTINAL: No abdominal or epigastric pain. No nausea, vomiting, or hematemesis; No diarrhea or constipation. No melena or hematochezia.  BACK: No back pain  GENITOURINARY: No dysuria, frequency or hematuria  NEUROLOGICAL: No numbness or weakness, + mild dizziness   SKIN: No itching, burning, rashes, or lesions   All other review of systems is negative unless indicated above.    Physical Exam:  T(F): 98.4 (12-19), Max: 98.4 (12-19)  HR: 40 (12-19) (36 - 40)  BP: 130/62 (12-19) (130/62 - 171/58)  RR: 20 (12-19)  SpO2: 96% (12-19)    Appearance: No acute distress; well appearing  Eyes: pink conjunctiva  HEENT: Dry oral mucosa  Cardiovascular: Bradycardic, S1, S2, no murmurs, rubs, or gallops; no edema; no JVD  Respiratory: Clear to auscultation bilaterally  Gastrointestinal: soft, non-tender, non-distended   Musculoskeletal: No clubbing; no joint deformity   Neurologic: Normal speech, no facial asymmetry  Psychiatry: AAOx3, mood & affect appropriate  Skin: No rashes, ecchymoses, or cyanosis of exposed skin                         13.8   9.36  )-----------( 163      ( 19 Dec 2022 19:39 )             43.6     12-19    144  |  111<H>  |  30<H>  ----------------------------<  194<H>  4.0   |  25  |  1.45<H>    Ca    9.4      19 Dec 2022 19:39  Mg     2.0     12-19    TPro  7.1  /  Alb  3.5  /  TBili  0.4  /  DBili  x   /  AST  16  /  ALT  25  /  AlkPhos  54  12-19    PT/INR - ( 19 Dec 2022 19:39 )   PT: 11.5 sec;   INR: 0.96 ratio    PTT - ( 19 Dec 2022 19:39 )  PTT:32.0 sec    Serum Pro-Brain Natriuretic Peptide: 458 pg/mL (12-19 @ 19:39)

## 2022-12-20 NOTE — ED ADULT NURSE NOTE - OBJECTIVE STATEMENT
58 Y/o male pmhx CAD, HLD, CVA (2012 with residual left sided weakness) p/w chief complaint of dizziness. Patient states that he woke up this morning and was feeling dizzy, felt like he had to hold on to the wall and his wife to walk around. States that he has never had these symptoms before but endorses passing out about 2 weeks ago. Did not present to hospital for symptoms of syncope. Denies any pain. Patient endorsed he had stress 2 months ago that was unremarkable. Denies SOB.

## 2022-12-20 NOTE — H&P ADULT - ATTENDING COMMENTS
57 y M with a PMHx of CAD, CVA (2012 with no significant residual weakness), DM, p/w bradycardia. EKG concerning for 3:1, Mobitz type II AV block. Patient pending further cardiac workup, and possible pacemaker placement.     Asymptomatic, denies chest pain, chest palpitation, dizziness. States GI symptoms resolved. PE: S1S1, bradycardic, +2 pedal pulses. Vitals WNL. TSH wnl. Trop negative. ECHO: EF 57% with mild MR.     Plan:   -Atropine and Zoll pad at bedside, if patient develops symptoms  -CT chest consistent with sarcoidosis  -HOLD av teri blockers  -Can consider work-up for lyme disease.  Monitor K+   -Tele monitoring   -EP following 57 y M with a PMHx of CAD, CVA (2012 with no significant residual weakness), DM, p/w bradycardia. EKG concerning for 3:1, Mobitz type II AV block. Patient pending further cardiac workup, and possible pacemaker placement.     Asymptomatic, denies chest pain, chest palpitation, dizziness. States GI symptoms resolved. PE: S1S1, bradycardic, +2 pedal pulses. Vitals WNL. TSH wnl. Trop negative. ECHO: EF 57% with mild MR.     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 Patient asymptomatic     Plan:   -Atropine and Zoll pad at bedside, if patient develops symptoms  -CT chest consistent with sarcoidosis: PPM vs. ICD?  -HOLD av teri blockers  -Can consider work-up for lyme disease.  Monitor K+   -C/w ASA and statin. Will start Plavix   -F/u  MRI brain w/o contrast, MRA NOVA  -Tele monitoring   -EP following, recs appreciated   -Neuro following, recs appreciated

## 2022-12-20 NOTE — CONSULT NOTE ADULT - ASSESSMENT
The patient is a 58yo male with PMH of prior CVA with mild left sided deficits, HTN, DM, vertigo and HLD who initially presented to [place] with generalized weakness associated with dizziness, emesis and head spinning sensation. EKG with 3:1 Mobitz type II AV block. Pt with symptoms improving and stable BP.     Impression:  - 3:1 Mobitz type II AV block   - BP stable   -  labs concerning for an LAURA   - Electrolytes WNL, Troponin 27 --> 37,   - Given patient's young age, will need evaluation for reversible causes and underlying cardiac pathology such as infiltrative disease     Recommendations:   - Admit to telemetry for close monitoring  - Hemodynamically stable at present    - Atropine and Zoll pads at bedside  - Hold AV teri blockers  - TSH/T4 measurement   - Echo to evaluate LV function  - Will likely need cardiac MRI to evaluate for sarcoid and other infiltrative disease which may be the cause of his conduction disease   - If no reversible cause is found, will consider pacemaker placement at that time     Katerine Ferguson MD   Cardiology Fellow       This consult note is preliminary until cosigned by the attending cardiologist.   The patient is a 56yo male with PMH of prior CVA with mild left sided deficits, HTN, DM, vertigo and HLD who initially presented to [place] with generalized weakness associated with dizziness, emesis and head spinning sensation. EKG with 3:1 Mobitz type II AV block. Pt with symptoms improving and stable BP.     Impression:  - 3:1 Mobitz type II AV block   - BP stable   -  labs concerning for an LAURA   - Electrolytes WNL, Troponin 27 --> 37,   - Given patient's young age, will need evaluation for reversible causes and underlying cardiac pathology such as infiltrative disease     Recommendations:   - Admit to telemetry for close monitoring  - Hemodynamically stable at present    - Atropine and Zoll pads at bedside  - Hold AV teri blockers  - TSH/T4 measurement   - Echo to evaluate LV function  - Will likely need cardiac MRI to evaluate for sarcoid and other infiltrative disease which may be the cause of his conduction disease   - If no reversible cause is found, will consider pacemaker placement at that time   - Case discussed with EP attending Dr. Dary Ferguson MD   Cardiology Fellow       This consult note is preliminary until cosigned by the attending cardiologist.   The patient is a 56yo male with PMH of prior CVA with mild left sided deficits, HTN, DM, vertigo and HLD who initially presented to [place] with generalized weakness associated with dizziness, emesis and head spinning sensation. EKG with 3:1 Mobitz type II AV block. Pt with symptoms improving and stable BP.     Impression:  - 3:1 Mobitz type II AV block   - BP stable   -  labs concerning for an LAURA   - Electrolytes WNL, Troponin 27 --> 37,   - Given patient's young age, will need evaluation for reversible causes and underlying cardiac pathology such as infiltrative disease     Recommendations:   - Admit to telemetry for close monitoring  - Hemodynamically stable at present    - Atropine and Zoll pads at bedside  - Hold AV teri blockers  - TSH/T4 measurement   - Echo to evaluate LV function  - Will likely need cardiac MRI to evaluate for sarcoid and other infiltrative disease which may be the cause of his conduction disease   - If no reversible cause is found, will consider pacemaker placement at that time   - Case discussed with EP attending Dr. Dary Ferguson MD   Cardiology Fellow     Daytime Fellow Addendum:  56yo male with PMH of prior CVA with mild left sided deficits, HTN, DM, vertigo and HLD who initially presented to [place] with generalized weakness associated with dizziness. Patient attributed symptoms to  lentils/chicken, had diarrhea and vomiting. 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 He has good exercise tolerance. He saw a cardiologist in the past (Dr. Villalobos) who performed a nuc stress test 2 months ago that was normal.   On history taking, ROS neg for respiratory sx or cough, any rashes, any vision changes. PSurghx is significant for right wrist surgery with prior vianey placement. He denies significant tick exposure (no hiking recently), no significant FHx of dysrhythmia (although mother passed in her 60's from an MI), and no significant illicits on social history. He takes asa 325, metformin/glipizide, losartan, amlodipine, and alfuzosin/finasteride, none of which are reversible causes of heart block.  On exam today, HR 35, in Mobitz II 3:1 AVB, /87, physical exam with bradycardia but no m/r/g.  Labs with normal TSH, K 3.2, Cre 1.36 (unknown baseline)    Plan:   - f/u TTE (Expedited to be done today)  - If abnormal, would obtain CT coronaries (which would also help evaluate lung parenchyma) for ischemic risk and concern for sarcoidosis  - If concerning echo/CT coronary findings, would arrange LHC   - If CT scan of chest is concerning for sarcoid changes, an ICD would be indicated (rather than just a pacemaker). PET-CT can be arranged as an outpatient  - NPO @ [facility], L arm IV access, active T&S in preparation for anticipated device placement  with Dr. Dary Flores, PGy-4  Cardiology Fellow    This consult note is preliminary until cosigned by the attending cardiologist.   The patient is a 56yo male with PMH of prior CVA with mild left sided deficits, HTN, DM, vertigo and HLD who initially presented to [place] with generalized weakness associated with dizziness, emesis and head spinning sensation. EKG with 3:1 Mobitz type II AV block. Pt with symptoms improving and stable BP.     Impression:  - 3:1 AV block (high-degree)    - BP stable   -  labs concerning for an LAURA   - Electrolytes WNL, Troponin 27 --> 37,   - Given patient's young age, will need evaluation for reversible causes and underlying cardiac pathology such as infiltrative disease     Recommendations:   - Admit to telemetry for close monitoring  - Hemodynamically stable at present    - Atropine and Zoll pads at bedside  - Hold AV teri blockers  - TSH/T4 measurement   - Echo to evaluate LV function  - Will likely need cardiac MRI to evaluate for sarcoid and other infiltrative disease which may be the cause of his conduction disease   - If no reversible cause is found, will consider pacemaker placement at that time   - Case discussed with EP attending Dr. Dary Ferguson MD   Cardiology Fellow     Daytime Fellow Addendum:  56yo male with PMH of prior CVA with mild left sided deficits, HTN, DM, vertigo and HLD who initially presented to [place] with generalized weakness associated with dizziness. Patient attributed symptoms to  lentils/chicken, had diarrhea and vomiting. 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 He has good exercise tolerance. He saw a cardiologist in the past (Dr. Villalobos) who performed a nuc stress test 2 months ago that was normal.   On history taking, ROS neg for respiratory sx or cough, any rashes, any vision changes. PSurghx is significant for right wrist surgery with prior vianey placement. He denies significant tick exposure (no hiking recently), no significant FHx of dysrhythmia (although mother passed in her 60's from an MI), and no significant illicits on social history. He takes asa 325, metformin/glipizide, losartan, amlodipine, and alfuzosin/finasteride, none of which are reversible causes of heart block.  On exam today, HR 35, in Mobitz II 3:1 AVB, /87, physical exam with bradycardia but no m/r/g.  Labs with normal TSH, K 3.2, Cre 1.36 (unknown baseline)    Plan:   - f/u TTE (Expedited to be done today)  - If abnormal, would obtain CT coronaries (which would also help evaluate lung parenchyma) for ischemic risk and concern for sarcoidosis  - If concerning echo/CT coronary findings, would arrange LHC   - If CT scan of chest is concerning for sarcoid changes, an ICD would be indicated (rather than just a pacemaker). PET-CT can be arranged as an outpatient  - NPO @ [facility], L arm IV access, active T&S in preparation for anticipated device placement  with Dr. Dary Flores, PGy-4  Cardiology Fellow    This consult note is preliminary until cosigned by the attending cardiologist.

## 2022-12-20 NOTE — CONSULT NOTE ADULT - SUBJECTIVE AND OBJECTIVE BOX
CARDIOLOGY FELLOW CONSULT NOTE      HPI:  The patient is a 56yo male with PMH of prior CVA with mild left sided deficits (on aspirin 325), HTN, DM, vertigo and HLD who initially presented to [place] with generalized weakness associated with dizziness, emesis and head spinning sensation. He states that he felt at his normal state of health last night (12/19 at 11pm) however woke up felling dizzy, needed his wife's assistance to get to the bathroom prompting him to come to the ED. Upon evaluation, pt denies any chest pain, dyspnea, light-headedness, palpitations or syncope. The pt was found to be bradycardic to the 30s-40s with EKG concerning for 3:1, Mobitz type II AV block. Pt was given Atropine x3, with HRs remaining in the 40s however pt apparently reporting symptomatic improvement.  Labs done at  significant for Cr of 1.45, Troponin I 27 and . Upon evaluation at [facility], the pt is hemodynamically stable. He is present with his wife and both report some concern for food poisoning that may have occurred 2 weeks ago during which the pt had symptoms of severe diarrhea and emesis and may have "passed out." Also reports eating large amount of mint daily for the last 15-20 years which he is worried could be contributing to not feeling well.  Currently reporting that his symptoms are significantly better however does have some mild light-headedness.       PMHx:   CVA (cerebral vascular accident)    HTN (hypertension)    DM (diabetes mellitus)    HTN (hypertension)        PSHx:   No significant past surgical history    S/P hernia repair    S/P cystoscopy        Allergies:  No Known Allergies      Social History:  Smoking History: None   Alcohol Use: None   Drug Use: None     REVIEW OF SYSTEMS:  CONSTITUTIONAL: + weakness, no fevers or chills  EYES/ENT: No visual changes;  No dysphagia  NECK: No pain or stiffness  RESPIRATORY: No cough, wheezing, hemoptysis; No shortness of breath  CARDIOVASCULAR: No chest pain or palpitations; No lower extremity edema  GASTROINTESTINAL: No abdominal or epigastric pain. No nausea, vomiting, or hematemesis; No diarrhea or constipation. No melena or hematochezia.  BACK: No back pain  GENITOURINARY: No dysuria, frequency or hematuria  NEUROLOGICAL: No numbness or weakness, + mild dizziness   SKIN: No itching, burning, rashes, or lesions   All other review of systems is negative unless indicated above.    Physical Exam:  T(F): 98.4 (12-19), Max: 98.4 (12-19)  HR: 40 (12-19) (36 - 40)  BP: 130/62 (12-19) (130/62 - 171/58)  RR: 20 (12-19)  SpO2: 96% (12-19)      Appearance: No acute distress; well appearing  Eyes: pink conjunctiva  HEENT: Dry oral mucosa  Cardiovascular: Bradycardic, S1, S2, no murmurs, rubs, or gallops; no edema; no JVD  Respiratory: Clear to auscultation bilaterally  Gastrointestinal: soft, non-tender, non-distended   Musculoskeletal: No clubbing; no joint deformity   Neurologic: Normal speech, no facial asymmetry  Psychiatry: AAOx3, mood & affect appropriate  Skin: No rashes, ecchymoses, or cyanosis of exposed skin                             13.8   9.36  )-----------( 163      ( 19 Dec 2022 19:39 )             43.6     12-19    144  |  111<H>  |  30<H>  ----------------------------<  194<H>  4.0   |  25  |  1.45<H>    Ca    9.4      19 Dec 2022 19:39  Mg     2.0     12-19    TPro  7.1  /  Alb  3.5  /  TBili  0.4  /  DBili  x   /  AST  16  /  ALT  25  /  AlkPhos  54  12-19    PT/INR - ( 19 Dec 2022 19:39 )   PT: 11.5 sec;   INR: 0.96 ratio         PTT - ( 19 Dec 2022 19:39 )  PTT:32.0 sec          Serum Pro-Brain Natriuretic Peptide: 458 pg/mL (12-19 @ 19:39)

## 2022-12-20 NOTE — PATIENT PROFILE ADULT - FALL HARM RISK - FACTORS
Alert and oriented to person, place, time/situation. normal mood and affect. no apparent risk to self or others. Dizziness/Other

## 2022-12-20 NOTE — PATIENT PROFILE ADULT - NSPROPTRIGHTBILLOFRIGHTS_GEN_A_NUR
[FreeTextEntry1] : PFT:\par mild obstruction\par normal lung volumes\par normal diffusion\par \par CXR:\par left loculated effusion with air fluid level suggestive of hydropneumothorax and c/w trapped lung. patient

## 2022-12-20 NOTE — H&P ADULT - NSHPSOCIALHISTORY_GEN_ALL_CORE
-Pt lives at home with wife  -works online [employer] + [employer]  -no smoking hx  -no drinking hx  -no illicit substance use

## 2022-12-20 NOTE — ED ADULT NURSE REASSESSMENT NOTE - NS ED NURSE REASSESS COMMENT FT1
Patient received from prior shift AAOx3, bed at lowest position, on zoll defib pads. Patient transferred from [facility]. Although noted states COVID swab was sent (patient also verbalizes) no orders were seen or test resulted. Labs sent, COVID swab re-sent. Patient denies dizziness s/p eating food. Patient used commode. Speaking clearly in full sentences, Resident doctor at bedside. Awaiting transfer to [facility]. Patient remains on cardiac monitor.

## 2022-12-20 NOTE — PATIENT PROFILE ADULT - FALL HARM RISK - HARM RISK INTERVENTIONS
Monitor for mental status changes/Monitor gait and stability/Reinforce activity limits and safety measures with patient and family/Reorient to person, place and time as needed/Review medications for side effects contributing to fall risk/Sit up slowly, dangle for a short time, stand at bedside before walking/Use of alarms - bed, chair and/or voice tab/Visual Cue: Yellow wristband and red socks/Bed in lowest position, wheels locked, appropriate side rails in place/Call bell, personal items and telephone in reach/Instruct patient to call for assistance before getting out of bed or chair/Non-slip footwear when patient is out of bed/[place] to call system/Physically safe environment - no spills, clutter or unnecessary equipment/Purposeful Proactive Rounding/Room/bathroom lighting operational, light cord in reach

## 2022-12-20 NOTE — CONSULT NOTE ADULT - ASSESSMENT
The patient is a 56yo male with PMH of prior CVA with mild left sided deficits, HTN, DM, vertigo and HLD who initially presented to [facility] with generalized weakness associated with dizziness, emesis and head spinning sensation. EKG with 3:1 Mobitz type II AV block. Pt with symptoms improving and stable BP.     Impression:  - 3:1 Mobitz type II AV block   - BP stable   -  labs concerning for an LAURA   - Electrolytes WNL, Troponin 27 --> 37,     Recommendations:   - Admit to telemetry for close monitoring  - Given patient's young age, will need evaluation for reversible causes  - Pt with high degree AV block on telemetry and EKG, 3:1 conduction.   - Hemodynamically stable at present    - Atropine and Zoll pads at bedside  - Hold AV teri blockers  - TSH/T4 measurement   - Echo to evaluate LV function  - Will likely need cardiac MRI to evaluate for sarcoid and other infiltrative disease which may be the cause of his conduction disease   - If no reversible cause is found, will consider pacemaker placement at that time     Katerine Ferguson MD   Cardiology Fellow       This consult note is preliminary until cosigned by the attending cardiologist.

## 2022-12-20 NOTE — H&P ADULT - NSHPREVIEWOFSYSTEMS_GEN_ALL_CORE
REVIEW OF SYSTEMS:    CONSTITUTIONAL: No weakness, fevers or chills  EYES/ENT: No visual changes;  No vertigo or throat pain   NECK: No pain or stiffness  RESPIRATORY: No cough, wheezing, hemoptysis; No shortness of breath  CARDIOVASCULAR: No chest pain or palpitations  GASTROINTESTINAL: +nausea, vomitting,  No abdominal or epigastric pain or hematemesis; No diarrhea or constipation. No melena or hematochezia.  GENITOURINARY: No dysuria, frequency or hematuria  NEUROLOGICAL: No numbness or weakness  SKIN: No itching, rashes

## 2022-12-20 NOTE — H&P ADULT - PROBLEM SELECTOR PLAN 2
-c/w atorvastatin  -CT angio head:  CTA BRAIN: Complete occlusion of the right distal V3 vertebral artery segment and multifocal occlusions with segmental opacification of the right intracranial V4 segment extending to the basilar confluence, the   chronicity of which is unknown. Further workup with MRA may be helpful in the evaluation for dissection. Multifocal severe narrowing of the left V4 segment.    Atherosclerotic changes with moderate stenosis of the right carotid siphon and mild stenosis of the left carotid siphon.    CTA NECK: Severe atherosclerotic narrowing at the origin of the left vertebral artery and mild to moderate narrowing involving both vertebral artery V2 segments. Noncalcified plaque with less than 50% stenosis at   the right proximal cervical ICA by NASCET criteria.

## 2022-12-20 NOTE — H&P ADULT - HISTORY OF PRESENT ILLNESS
Patient is a 57 y M with a PMHx of CAD, CVA (2012 with no significant residual weakness), DM, p/w bradycardia. The day prior to admission, pt reports feeling dizzy, off-balance, unable to walk. He also experienced nausea and had some episodes of vomiting. He went to [facility] where he was noted to be bradycardic, was transferred here. Upon presentation, HR in the 30s. EKG concerning for 3:1, Mobitz type II AV block. Pt was given Atropine x3, with HRs remaining in the 40s. Pt denies chest pain, dyspnea, abdominal pain. Pt does not regularly follow up with a cardiologist but had seen one (for the first time), 2 months after being discharged from the ED of an OSH for abdominal pain. Per patient report, stress test was normal.      Pt denies sick contacts and recent travel.

## 2022-12-21 DIAGNOSIS — R59.0 LOCALIZED ENLARGED LYMPH NODES: ICD-10-CM

## 2022-12-21 LAB
ALBUMIN SERPL ELPH-MCNC: 3.6 G/DL — SIGNIFICANT CHANGE UP (ref 3.3–5)
ALP SERPL-CCNC: 55 U/L — SIGNIFICANT CHANGE UP (ref 40–120)
ALT FLD-CCNC: 17 U/L — SIGNIFICANT CHANGE UP (ref 10–45)
ANION GAP SERPL CALC-SCNC: 13 MMOL/L — SIGNIFICANT CHANGE UP (ref 5–17)
APTT BLD: 28.4 SEC — SIGNIFICANT CHANGE UP (ref 27.5–35.5)
AST SERPL-CCNC: 15 U/L — SIGNIFICANT CHANGE UP (ref 10–40)
B BURGDOR C6 AB SER-ACNC: NEGATIVE — SIGNIFICANT CHANGE UP
B BURGDOR IGG+IGM SER-ACNC: 0.06 INDEX — SIGNIFICANT CHANGE UP (ref 0.01–0.89)
BASOPHILS # BLD AUTO: 0.03 K/UL — SIGNIFICANT CHANGE UP (ref 0–0.2)
BASOPHILS NFR BLD AUTO: 0.4 % — SIGNIFICANT CHANGE UP (ref 0–2)
BILIRUB SERPL-MCNC: 0.2 MG/DL — SIGNIFICANT CHANGE UP (ref 0.2–1.2)
BLD GP AB SCN SERPL QL: NEGATIVE — SIGNIFICANT CHANGE UP
BUN SERPL-MCNC: 31 MG/DL — HIGH (ref 7–23)
CALCIUM SERPL-MCNC: 8.9 MG/DL — SIGNIFICANT CHANGE UP (ref 8.4–10.5)
CHLORIDE SERPL-SCNC: 109 MMOL/L — HIGH (ref 96–108)
CO2 SERPL-SCNC: 23 MMOL/L — SIGNIFICANT CHANGE UP (ref 22–31)
CREAT SERPL-MCNC: 1.5 MG/DL — HIGH (ref 0.5–1.3)
EGFR: 55 ML/MIN/1.73M2 — LOW
EOSINOPHIL # BLD AUTO: 0.11 K/UL — SIGNIFICANT CHANGE UP (ref 0–0.5)
EOSINOPHIL NFR BLD AUTO: 1.4 % — SIGNIFICANT CHANGE UP (ref 0–6)
GLUCOSE BLDC GLUCOMTR-MCNC: 153 MG/DL — HIGH (ref 70–99)
GLUCOSE BLDC GLUCOMTR-MCNC: 174 MG/DL — HIGH (ref 70–99)
GLUCOSE BLDC GLUCOMTR-MCNC: 182 MG/DL — HIGH (ref 70–99)
GLUCOSE BLDC GLUCOMTR-MCNC: 187 MG/DL — HIGH (ref 70–99)
GLUCOSE SERPL-MCNC: 201 MG/DL — HIGH (ref 70–99)
HCT VFR BLD CALC: 40.4 % — SIGNIFICANT CHANGE UP (ref 39–50)
HGB BLD-MCNC: 12.8 G/DL — LOW (ref 13–17)
IMM GRANULOCYTES NFR BLD AUTO: 0.5 % — SIGNIFICANT CHANGE UP (ref 0–0.9)
INR BLD: 1.11 RATIO — SIGNIFICANT CHANGE UP (ref 0.88–1.16)
LYMPHOCYTES # BLD AUTO: 1.37 K/UL — SIGNIFICANT CHANGE UP (ref 1–3.3)
LYMPHOCYTES # BLD AUTO: 18.1 % — SIGNIFICANT CHANGE UP (ref 13–44)
MAGNESIUM SERPL-MCNC: 2 MG/DL — SIGNIFICANT CHANGE UP (ref 1.6–2.6)
MCHC RBC-ENTMCNC: 25.5 PG — LOW (ref 27–34)
MCHC RBC-ENTMCNC: 31.7 GM/DL — LOW (ref 32–36)
MCV RBC AUTO: 80.5 FL — SIGNIFICANT CHANGE UP (ref 80–100)
MONOCYTES # BLD AUTO: 0.65 K/UL — SIGNIFICANT CHANGE UP (ref 0–0.9)
MONOCYTES NFR BLD AUTO: 8.6 % — SIGNIFICANT CHANGE UP (ref 2–14)
NEUTROPHILS # BLD AUTO: 5.39 K/UL — SIGNIFICANT CHANGE UP (ref 1.8–7.4)
NEUTROPHILS NFR BLD AUTO: 71 % — SIGNIFICANT CHANGE UP (ref 43–77)
NRBC # BLD: 0 /100 WBCS — SIGNIFICANT CHANGE UP (ref 0–0)
PHOSPHATE SERPL-MCNC: 2.7 MG/DL — SIGNIFICANT CHANGE UP (ref 2.5–4.5)
PLATELET # BLD AUTO: 129 K/UL — LOW (ref 150–400)
POTASSIUM SERPL-MCNC: 3.3 MMOL/L — LOW (ref 3.5–5.3)
POTASSIUM SERPL-SCNC: 3.3 MMOL/L — LOW (ref 3.5–5.3)
PROT SERPL-MCNC: 6.4 G/DL — SIGNIFICANT CHANGE UP (ref 6–8.3)
PROTHROM AB SERPL-ACNC: 12.9 SEC — SIGNIFICANT CHANGE UP (ref 10.5–13.4)
RBC # BLD: 5.02 M/UL — SIGNIFICANT CHANGE UP (ref 4.2–5.8)
RBC # FLD: 15.3 % — HIGH (ref 10.3–14.5)
RH IG SCN BLD-IMP: POSITIVE — SIGNIFICANT CHANGE UP
SODIUM SERPL-SCNC: 145 MMOL/L — SIGNIFICANT CHANGE UP (ref 135–145)
WBC # BLD: 7.59 K/UL — SIGNIFICANT CHANGE UP (ref 3.8–10.5)
WBC # FLD AUTO: 7.59 K/UL — SIGNIFICANT CHANGE UP (ref 3.8–10.5)

## 2022-12-21 PROCEDURE — 71045 X-RAY EXAM CHEST 1 VIEW: CPT | Mod: 26

## 2022-12-21 PROCEDURE — 93010 ELECTROCARDIOGRAM REPORT: CPT

## 2022-12-21 PROCEDURE — 99222 1ST HOSP IP/OBS MODERATE 55: CPT | Mod: GC

## 2022-12-21 PROCEDURE — 88305 TISSUE EXAM BY PATHOLOGIST: CPT | Mod: 26

## 2022-12-21 PROCEDURE — 33249 INSJ/RPLCMT DEFIB W/LEAD(S): CPT | Mod: 59

## 2022-12-21 PROCEDURE — 99233 SBSQ HOSP IP/OBS HIGH 50: CPT

## 2022-12-21 RX ORDER — CHLORHEXIDINE GLUCONATE 213 G/1000ML
1 SOLUTION TOPICAL
Refills: 0 | Status: DISCONTINUED | OUTPATIENT
Start: 2022-12-21 | End: 2022-12-28

## 2022-12-21 RX ORDER — CEFAZOLIN SODIUM 1 G
2000 VIAL (EA) INJECTION EVERY 8 HOURS
Refills: 0 | Status: COMPLETED | OUTPATIENT
Start: 2022-12-22 | End: 2022-12-22

## 2022-12-21 RX ORDER — ASPIRIN/CALCIUM CARB/MAGNESIUM 324 MG
81 TABLET ORAL DAILY
Refills: 0 | Status: DISCONTINUED | OUTPATIENT
Start: 2022-12-21 | End: 2022-12-28

## 2022-12-21 RX ORDER — CEPHALEXIN 500 MG
250 CAPSULE ORAL EVERY 6 HOURS
Refills: 0 | Status: DISCONTINUED | OUTPATIENT
Start: 2022-12-22 | End: 2022-12-22

## 2022-12-21 RX ORDER — POTASSIUM CHLORIDE 20 MEQ
40 PACKET (EA) ORAL ONCE
Refills: 0 | Status: DISCONTINUED | OUTPATIENT
Start: 2022-12-21 | End: 2022-12-28

## 2022-12-21 RX ADMIN — CHLORHEXIDINE GLUCONATE 1 APPLICATION(S): 213 SOLUTION TOPICAL at 05:43

## 2022-12-21 RX ADMIN — Medication 1: at 13:17

## 2022-12-21 RX ADMIN — CHLORHEXIDINE GLUCONATE 1 APPLICATION(S): 213 SOLUTION TOPICAL at 21:31

## 2022-12-21 RX ADMIN — TAMSULOSIN HYDROCHLORIDE 0.8 MILLIGRAM(S): 0.4 CAPSULE ORAL at 21:25

## 2022-12-21 RX ADMIN — FINASTERIDE 5 MILLIGRAM(S): 5 TABLET, FILM COATED ORAL at 13:17

## 2022-12-21 RX ADMIN — ATORVASTATIN CALCIUM 80 MILLIGRAM(S): 80 TABLET, FILM COATED ORAL at 21:25

## 2022-12-21 RX ADMIN — Medication 1: at 09:09

## 2022-12-21 NOTE — CONSULT NOTE ADULT - SUBJECTIVE AND OBJECTIVE BOX
incomplete BRANDEN MARRUFO  [id number]    HPI: 58 yo M PMH HTN, HLD, DM2, prior stroke in 2012 (with residual left sided numbness) transferred from OSH w/ bradycardia and need for pacemaker. Pt says he is generally healthy. Says he started experiencing dizziness and unbalanced on feet 2 days ago. Also says that about 2 weeks prior, he had brief loss of consciousness that his wife witnessed, however thought to have been food poisoning as he had nausea/vomiting/diarrhea. He did not present to the hospital at that time. Says he is overall pretty healthy and has no other medical issues besides HTN. Denies any cardiac or CAD hx. Denies family hx of autoimmune disease, including SLE/sarcoid. Used to work in convenience store, now works w/ [employer]. No prior hx of  shop/9-11/mine/parrot exposure.     ROS:   -admits to intentional weight loss w/ carb restriction 2/2 his diabetes hx  -denies hx of uveitis/visual disturbance, nasal/oral ulcers, alopecia, sicca symptoms, rash/erythema nodosum, joint pain, raynauds, B symptoms such as fever, chills, night sweats, cough, wheezing, shortness of breath, chest pain    Hospital course: Found to be bradycardic to the 30s-40s with EKG showing 3:1 Mobitz type II AV block. CT chest showing incidental mediastinal and supraclavicular lymph nodes (upper tracheal lymph node with calcification that measures 3.1  x 2.9 cm), left supraclavicular lymph node (4.1 x 2.9 cm), subcarinal lymph node (2.3 x 1.9 cm). Pt w/ new heart block and lymph node findings, Rheumatology consulted to evaluate for sarcoidosis.     MEDICATIONS  (STANDING):  aspirin enteric coated 81 milliGRAM(s) Oral daily  atorvastatin 80 milliGRAM(s) Oral at bedtime  chlorhexidine 2% Cloths 1 Application(s) Topical <User Schedule>  clopidogrel Tablet 75 milliGRAM(s) Oral daily  dextrose 50% Injectable 25 Gram(s) IV Push once  dextrose 50% Injectable 12.5 Gram(s) IV Push once  dextrose 50% Injectable 25 Gram(s) IV Push once  dextrose Oral Gel 15 Gram(s) Oral once  finasteride 5 milliGRAM(s) Oral daily  glucagon  Injectable 1 milliGRAM(s) IntraMuscular once  influenza   Vaccine 0.5 milliLiter(s) IntraMuscular once  insulin lispro (ADMELOG) corrective regimen sliding scale   SubCutaneous three times a day before meals  insulin lispro (ADMELOG) corrective regimen sliding scale   SubCutaneous at bedtime  potassium chloride   Solution 40 milliEquivalent(s) Oral once  tamsulosin 0.8 milliGRAM(s) Oral at bedtime    MEDICATIONS  (PRN):  acetaminophen     Tablet .. 650 milliGRAM(s) Oral every 6 hours PRN Temp greater or equal to 38C (100.4F), Mild Pain (1 - 3)  aluminum hydroxide/magnesium hydroxide/simethicone Suspension 30 milliLiter(s) Oral every 4 hours PRN Dyspepsia  atropine Injectable 1 milliGRAM(s) IV Push once PRN symptomatic bradycardia  melatonin 3 milliGRAM(s) Oral at bedtime PRN Insomnia  ondansetron Injectable 4 milliGRAM(s) IV Push every 8 hours PRN Nausea and/or Vomiting      Allergies    No Known Allergies    Intolerances     MEDICATIONS: steglaro, lipitor, aspirin, losartan, amlodipine, glipizide, finasteride, metformin    SURGERIES: None     SOCIAL HISTORY: Lives w/ wife. No hx of tobacco or alcohol use.    FAMILY HISTORY: mom had DM2      Vital Signs Last 24 Hrs  T(C): 37.1 (21 Dec 2022 14:56), Max: 37.1 (21 Dec 2022 04:23)  T(F): 98.8 (21 Dec 2022 14:56), Max: 98.8 (21 Dec 2022 04:23)  HR: 51 (21 Dec 2022 14:56) (48 - 62)  BP: 169/75 (21 Dec 2022 14:56) (140/66 - 181/80)  BP(mean): 83 (21 Dec 2022 13:34) (83 - 83)  RR: 14 (21 Dec 2022 14:56) (14 - 18)  SpO2: 95% (21 Dec 2022 14:56) (95% - 98%)    Parameters below as of 21 Dec 2022 04:23  Patient On (Oxygen Delivery Method): room air        Physical Exam:  General: Breathing comfortably on room air, no distress  HEENT: EOMI, MMM, no oral ulcers  CVS: +S1/S2, bradycardic  Resp: CTA b/l. No crackles/wheezing  GI: Soft, NT/ND +BS  MSK: no synovitis  Skin: no visible rashes    LABS:                        12.8   7.59  )-----------( 129      ( 21 Dec 2022 06:58 )             40.4     12-21    145  |  109<H>  |  31<H>  ----------------------------<  201<H>  3.3<L>   |  23  |  1.50<H>    Ca    8.9      21 Dec 2022 06:57  Phos  2.7     12-21  Mg     2.0     12-21    TPro  6.4  /  Alb  3.6  /  TBili  0.2  /  DBili  x   /  AST  15  /  ALT  17  /  AlkPhos  55  12-21    PT/INR - ( 21 Dec 2022 06:58 )   PT: 12.9 sec;   INR: 1.11 ratio         PTT - ( 21 Dec 2022 06:58 )  PTT:28.4 sec      RADIOLOGY & ADDITIONAL STUDIES: Reviewed    < from: CT Chest No Cont (12.20.22 @ 17:04) >    ACC: [id number] EXAM:  CT CHEST                          PROCEDURE DATE:  12/20/2022          INTERPRETATION:  CLINICAL INFORMATION: Heart block, syncope and   dizziness. Evaluate for sarcoidosis.    COMPARISON: Chest radiograph 12/19/2022.    CONTRAST/COMPLICATIONS:  IV Contrast: NONE  Oral Contrast: NONE  Complications: None reported at time of study completion    PROCEDURE:  CT scan of the chest was obtained without intravenous contrast.    FINDINGS:    LYMPH NODES: Mediastinal and supraclavicular lymph nodes, for reference   there is a upper tracheal lymph node with calcification that measures 3.1   x 2.9 cm (series 2 image 37), a left supraclavicular lymph node that   measures 4.1 x 2.9 cm (series 5 image 39) and a subcarinal lymph node   that measures 2.3 x 1.9 cm (series 5 image 54).    HEART/VASCULATURE: Heart size is within normal limits. No pericardial   effusion. Normal caliber aorta. Calcifications of the coronary arteries   and aorta.    AIRWAYS/LUNGS/PLEURA: The central airways are patent. There are   peripheral curvilinear opacities in the bilateral lower lobes and upper   lobes. The remainder of the lungs are clear. No pleural effusion or   pneumothorax.    UPPER ABDOMEN: Left kidney cyst.    BONES/SOFT TISSUES: Degenerative changes of the spine. No aggressive   osseous lesion.    IMPRESSION:    1.  Mediastinal and supraclavicular lymph nodes, as described above.    --- End of Report ---           EMERSON DENIS MD; Resident Radiologist  This document has been electronically signed.  DAGOBERTO FIGUEROA MD; Attending Radiologist  This document has been electronically signed. Dec 21 2022  9:27AM    < end of copied text >

## 2022-12-21 NOTE — PROGRESS NOTE ADULT - ASSESSMENT
57 year-old right-handed man with HTN, HLD, NIDDM2, prior stroke in 2012 (with residual left sided ?numbness) who initially presented to [facility] on 12/19/22 with c/o generalized weakness, dizziness and vomiting noted upon waking up in the AM, was found to be bradycardic to the 30s-40s with EKG concerning for 3:1 Mobitz type II AV block, transferred to [facility] for further cardiac evaluation and management. neuro called for stroke management and CT findings   On exam patient reported minimally decreased sensation to pinprick in the LUE/LLE, but otherwise no localizing deficits were noted.   premrs 1   NIHSS: 1    CTH chronic appearing R cerebellar infarct   CTA H/N severe narrowing at the origin of the left vertebral artery and mild to moderate narrowing of both vertebral artery V2 segments, while CTA head was reported to show complete occlusion of the right distal V3 segment, multifocal occlusions with segmental opacification of the right V4 segment extending to the basilar confluence, and multifocal severe narrowing of the left V4 segment,  A1c 6.3   TTE done        Impression: Acute onset dizziness/lightheadedness, nausea, vomiting, likely multifactorial --> severe bradycardia from Mobitz type II AV block, does also have chronic appearing cerebellar infarct . Will evaluate for superimposed acute ischemic cerebrovascular event given possibility of hypoperfusion from persistent bradycardia in the setting of severe intracranial atherosclerotic disease of the posterior circulation.       Recommendations:   - supracliviular bx today with IR   - MRI brain w/o contrast and MR novshelby when able   - Continue home aspirin 325mg daily  - Start plavix 75mg daily for now, can stop if MRI (-) for acute infarct  - Continue home atorvastatin 80mg   - Workup and management of Mobitz type II AV block as per EP/Cardiology, possible PPM   - telemetry  - PT/OT/SS/SLP, OOBC  - avoid hypotension  - check orthostatics   - check FS, glucose control <180  - GI/DVT ppx  - Thank you for allowing me to participate in the care of this patient. Call with questions.   Abelardo Irving MD  Vascular Neurology  Office: [phone] .

## 2022-12-21 NOTE — PROGRESS NOTE ADULT - ATTENDING COMMENTS
Patient seen at bedside on 4 [facility]. CT reviewed with IR Attending -  left supraclavicular lymph node measures 4.1 x 2.9 cm - amendable to percutaneous sampling with US guidance. I suspect that he has cardiac sarcoid - if so 2-ch ICD. If he does not have sarcoid 2-ch PPM. I reviewed this at length with the patient and his wife at the bedside. I also discussed the case with the Medicine Attending. Keep NPO. Plan for tissue sampling by IR this afternoon followed by device implant.    MAIDA Foote

## 2022-12-21 NOTE — PROGRESS NOTE ADULT - PROBLEM SELECTOR PLAN 5
Diet: dash diet  DVT:   Dispo: pending clinical course ISS  monitor blood glucose  FS at mealtimes/bedtime

## 2022-12-21 NOTE — PROGRESS NOTE ADULT - SUBJECTIVE AND OBJECTIVE BOX
INTERVAL HPI/OVERNIGHT EVENTS:  Pt seen and examined at bedside. No acute overnight events or complaints.    VITAL SIGNS:  T(F): 98.8 (12-21-22 @ 04:23)  HR: 52 (12-21-22 @ 04:23)  BP: 162/72 (12-21-22 @ 04:23)  RR: 17 (12-21-22 @ 04:23)  SpO2: 95% (12-21-22 @ 04:23)  Wt(kg): --    PHYSICAL EXAM:    Constitutional: WDWN, NAD  HEENT: PERRL, EOMI, sclera non-icteric, neck supple, trachea midline, no masses, no JVD, MMM, good dentition  Respiratory: CTA b/l, good air entry b/l, no wheezing, no rhonchi, no rales, without accessory muscle use and no intercostal retractions  Cardiovascular: RRR, normal S1S2, no M/R/G  Gastrointestinal: soft, NTND, no masses palpable, BS normal  Extremities: Warm, well perfused, pulses equal bilateral upper and lower extremities, no edema, no clubbing. Capillary refill <2 sec  Neurological: AAOx3, CN Grossly intact  Skin: Normal temperature, warm, dry    MEDICATIONS  (STANDING):  aspirin enteric coated 325 milliGRAM(s) Oral daily  atorvastatin 80 milliGRAM(s) Oral at bedtime  clopidogrel Tablet 75 milliGRAM(s) Oral daily  dextrose 50% Injectable 25 Gram(s) IV Push once  dextrose 50% Injectable 12.5 Gram(s) IV Push once  dextrose 50% Injectable 25 Gram(s) IV Push once  dextrose Oral Gel 15 Gram(s) Oral once  finasteride 5 milliGRAM(s) Oral daily  glucagon  Injectable 1 milliGRAM(s) IntraMuscular once  influenza   Vaccine 0.5 milliLiter(s) IntraMuscular once  insulin lispro (ADMELOG) corrective regimen sliding scale   SubCutaneous three times a day before meals  insulin lispro (ADMELOG) corrective regimen sliding scale   SubCutaneous at bedtime  tamsulosin 0.8 milliGRAM(s) Oral at bedtime    MEDICATIONS  (PRN):  acetaminophen     Tablet .. 650 milliGRAM(s) Oral every 6 hours PRN Temp greater or equal to 38C (100.4F), Mild Pain (1 - 3)  aluminum hydroxide/magnesium hydroxide/simethicone Suspension 30 milliLiter(s) Oral every 4 hours PRN Dyspepsia  atropine Injectable 1 milliGRAM(s) IV Push once PRN symptomatic bradycardia  melatonin 3 milliGRAM(s) Oral at bedtime PRN Insomnia  ondansetron Injectable 4 milliGRAM(s) IV Push every 8 hours PRN Nausea and/or Vomiting      Allergies    No Known Allergies    Intolerances        LABS:                        12.7   9.37  )-----------( 153      ( 20 Dec 2022 08:16 )             39.4     12-20    142  |  107  |  29<H>  ----------------------------<  261<H>  3.2<L>   |  22  |  1.36<H>    Ca    8.9      20 Dec 2022 08:16  Phos  2.9     12-20  Mg     1.9     12-20    TPro  6.4  /  Alb  3.8  /  TBili  0.4  /  DBili  x   /  AST  16  /  ALT  17  /  AlkPhos  50  12-20          RADIOLOGY & ADDITIONAL TESTS:  Reviewed      ******************  Authored By: Mayra Burks MD PGY1  Internal Medicine  Pager: [phone] [facility]// [id number] [address]  MS Teams Preferred  ******************   INTERVAL HPI/OVERNIGHT EVENTS:  Pt seen and examined at bedside. No acute overnight events or complaints.    VITAL SIGNS:  T(F): 98.8 (12-21-22 @ 04:23)  HR: 52 (12-21-22 @ 04:23)  BP: 162/72 (12-21-22 @ 04:23)  RR: 17 (12-21-22 @ 04:23)  SpO2: 95% (12-21-22 @ 04:23)  Wt(kg): --    PHYSICAL EXAM:    CONSTITUTIONAL: No weakness, fevers or chills  EYES/ENT: No visual changes;  No vertigo or throat pain   NECK: No pain or stiffness  RESPIRATORY: No cough, wheezing, hemoptysis; No shortness of breath  CARDIOVASCULAR: No chest pain or palpitations  GASTROINTESTINAL: +nausea, vomitting,  No abdominal or epigastric pain or hematemesis; No diarrhea or constipation. No melena or hematochezia.  GENITOURINARY: No dysuria, frequency or hematuria  NEUROLOGICAL: No numbness or weakness  SKIN: No itching, rashes    MEDICATIONS  (STANDING):  aspirin enteric coated 325 milliGRAM(s) Oral daily  atorvastatin 80 milliGRAM(s) Oral at bedtime  clopidogrel Tablet 75 milliGRAM(s) Oral daily  dextrose 50% Injectable 25 Gram(s) IV Push once  dextrose 50% Injectable 12.5 Gram(s) IV Push once  dextrose 50% Injectable 25 Gram(s) IV Push once  dextrose Oral Gel 15 Gram(s) Oral once  finasteride 5 milliGRAM(s) Oral daily  glucagon  Injectable 1 milliGRAM(s) IntraMuscular once  influenza   Vaccine 0.5 milliLiter(s) IntraMuscular once  insulin lispro (ADMELOG) corrective regimen sliding scale   SubCutaneous three times a day before meals  insulin lispro (ADMELOG) corrective regimen sliding scale   SubCutaneous at bedtime  tamsulosin 0.8 milliGRAM(s) Oral at bedtime    MEDICATIONS  (PRN):  acetaminophen     Tablet .. 650 milliGRAM(s) Oral every 6 hours PRN Temp greater or equal to 38C (100.4F), Mild Pain (1 - 3)  aluminum hydroxide/magnesium hydroxide/simethicone Suspension 30 milliLiter(s) Oral every 4 hours PRN Dyspepsia  atropine Injectable 1 milliGRAM(s) IV Push once PRN symptomatic bradycardia  melatonin 3 milliGRAM(s) Oral at bedtime PRN Insomnia  ondansetron Injectable 4 milliGRAM(s) IV Push every 8 hours PRN Nausea and/or Vomiting      Allergies    No Known Allergies    Intolerances        LABS:                        12.7   9.37  )-----------( 153      ( 20 Dec 2022 08:16 )             39.4     12-20    142  |  107  |  29<H>  ----------------------------<  261<H>  3.2<L>   |  22  |  1.36<H>    Ca    8.9      20 Dec 2022 08:16  Phos  2.9     12-20  Mg     1.9     12-20    TPro  6.4  /  Alb  3.8  /  TBili  0.4  /  DBili  x   /  AST  16  /  ALT  17  /  AlkPhos  50  12-20          RADIOLOGY & ADDITIONAL TESTS:  Reviewed      ******************  Authored By: Mayra Burks MD PGY1  Internal Medicine  Pager: [phone] [facility]// [id number] [facility]  MS Teams Preferred  ******************

## 2022-12-21 NOTE — PRE-ANESTHESIA EVALUATION ADULT - NSRADCARDRESULTSFT_GEN_ALL_CORE
12/20/22: TTE  EF (Modified Mayers Rule): 57 %Doppler Peak Velocity (m/sec): AoV=2.2  ------------------------------------------------------------------------  Observations:  Mitral Valve: Mitral annular calcification and calcified mitral leaflets with normal diastolic opening. Mild mitral regurgitation.  Aortic Valve/Aorta: Calcified trileaflet aortic valve with normal opening. Peak transaortic valve gradient equals 20 mm Hg, mean transaortic valve gradient equals 9 mm Hg, aortic valve velocity time integral equals 47 cm, estimated aortic valve area equals 2.1 sqcm. No aortic valve regurgitation seen. Peak left ventricular outflow tract gradient equals 8 mm Hg, mean gradient is equal to 5 mm Hg, LVOT velocity time integral equals 37 cm.  Aortic Root: 3.2 cm.  LVOT diameter: 1.7 cm.  Left Atrium: Normal left atrium.  LA volume index = 27 cc/m2.  Left Ventricle: Normal left ventricular systolic function. No segmental wall motion abnormalities. Increased relative wall thickness with normal left ventricular mass index, consistent with concentric left ventricular remodeling.  Normal diastolic function.  Right Heart: Normal right atrium. Normal right ventricular size and function. Normal tricuspid valve. Mild tricuspid regurgitation. Normal pulmonic valve. No pulmonic regurgitation.  Pericardium/Pleura: Normal pericardium with no pericardial effusion.  Hemodynamic: Estimated right atrial pressure is 8 mm Hg. Estimated right ventricular systolic pressure equals 35 mm Hg, assuming right atrial pressure equals 8 mm Hg, consistent with borderline pulmonary hypertension.  ------------------------------------------------------------------------  Conclusions:  1. Mitral annular calcification and calcified mitral leaflets with normal diastolic opening. Mild mitral  regurgitation.  2. Calcified trileaflet aortic valve with normal opening. No aortic valve regurgitation seen.  3. Normal left ventricular systolic function. No segmental wall motion abnormalities.  4. Normal diastolic function.  5. Normal right ventricular size and function.  *** No previous Echo exam.

## 2022-12-21 NOTE — PROGRESS NOTE ADULT - ASSESSMENT
Patient is a 57 y M with a PMHx of CAD, CVA (2012 with no significant residual weakness), DM, p/w bradycardia. EKG concerning for 3:1, Mobitz type II AV block. Patient pending further cardiac workup, and possible pacemaker placement.  Patient is a 57 y M with a PMHx of CAD, CVA (2012 with no significant residual weakness), DM, p/w bradycardia. EKG concerning for 3:1, Mobitz type II AV block. CT chest: Mediastinal and supraclavicular lymph nodes. Pt pending ICD placement and further workup.

## 2022-12-21 NOTE — PROGRESS NOTE ADULT - PROBLEM SELECTOR PLAN 1
-EKG showed 3:1, Mobitz type II AV block. Pt was given Atropine x3, with HRs remaining in the 40s.  -no prior history   -TSH wnl  -TTE  -cardiac MRI  -ep following, appreciate recs  -possible pacemaker placement -EKG showed 3:1, Mobitz type II AV block. Pt was given Atropine x3, with HRs remaining in the 40s.  -no prior history   -TSH wnl  -TTE  -cardiac MRI  -ep following, appreciate recs  -CT chest noncon: mediastinal LAD  -ICD placement on 12/21

## 2022-12-21 NOTE — PROGRESS NOTE ADULT - PROBLEM SELECTOR PLAN 4
ISS  monitor blood glucose  FS at mealtimes/bedtime holding home amlodipine/losartan in the setting of LAURA

## 2022-12-21 NOTE — PROGRESS NOTE ADULT - SUBJECTIVE AND OBJECTIVE BOX
Blas Flores MD  Cardiology Fellow  [phone]  All Cardiology service information can be found 24/7 on [email or web address], password: [email or web address]    Patient seen and examined at bedside.  On Tele currently w 2:1 block HR 40s-50s, was in 3:1 block previously  CT overnight with marked mediastinal LN, a degree of hilar LN per discussion with radiology  Plan for ICD today, with sarcoidosis workup with tissue sampling preferable thereafter     Review Of Systems: No chest pain, shortness of breath, or palpitations            Current Meds:  acetaminophen     Tablet .. 650 milliGRAM(s) Oral every 6 hours PRN  aluminum hydroxide/magnesium hydroxide/simethicone Suspension 30 milliLiter(s) Oral every 4 hours PRN  aspirin enteric coated 325 milliGRAM(s) Oral daily  atorvastatin 80 milliGRAM(s) Oral at bedtime  atropine Injectable 1 milliGRAM(s) IV Push once PRN  clopidogrel Tablet 75 milliGRAM(s) Oral daily  dextrose 50% Injectable 25 Gram(s) IV Push once  dextrose 50% Injectable 12.5 Gram(s) IV Push once  dextrose 50% Injectable 25 Gram(s) IV Push once  dextrose Oral Gel 15 Gram(s) Oral once  finasteride 5 milliGRAM(s) Oral daily  glucagon  Injectable 1 milliGRAM(s) IntraMuscular once  influenza   Vaccine 0.5 milliLiter(s) IntraMuscular once  insulin lispro (ADMELOG) corrective regimen sliding scale   SubCutaneous three times a day before meals  insulin lispro (ADMELOG) corrective regimen sliding scale   SubCutaneous at bedtime  melatonin 3 milliGRAM(s) Oral at bedtime PRN  ondansetron Injectable 4 milliGRAM(s) IV Push every 8 hours PRN  tamsulosin 0.8 milliGRAM(s) Oral at bedtime    Vitals:  T(F): 98.8 (12-21), Max: 98.8 (12-21)  HR: 52 (12-21) (34 - 52)  BP: 162/72 (12-21) (135/73 - 181/80)  RR: 17 (12-21)  SpO2: 95% (12-21)  I&O's Summary    Physical Exam:  Appearance: No acute distress; well appearing  Eyes: PERRL, EOMI, pink conjunctiva  HEENT: Normal oral mucosa  Cardiovascular: RRR, S1, S2, bradycardic   Respiratory: Clear to auscultation bilaterally  Gastrointestinal: soft, non-tender, non-distended with normal bowel sounds  Musculoskeletal: No clubbing; no joint deformity   Neurologic: Non-focal  Lymphatic: No lymphadenopathy  Psychiatry: AAOx3, mood & affect appropriate  Skin: No rashes, ecchymoses, or cyanosis                          12.8   7.59  )-----------( 129      ( 21 Dec 2022 06:58 )             40.4     12-21    145  |  109<H>  |  31<H>  ----------------------------<  201<H>  3.3<L>   |  23  |  1.50<H>    Ca    8.9      21 Dec 2022 06:57  Phos  2.7     12-21  Mg     2.0     12-21    TPro  6.4  /  Alb  3.6  /  TBili  0.2  /  DBili  x   /  AST  15  /  ALT  17  /  AlkPhos  55  12-21    PT/INR - ( 21 Dec 2022 06:58 )   PT: 12.9 sec;   INR: 1.11 ratio         PTT - ( 21 Dec 2022 06:58 )  PTT:28.4 sec  CARDIAC MARKERS ( 20 Dec 2022 03:21 )  42 ng/L / x     / x     / x     / x     / x      CARDIAC MARKERS ( 19 Dec 2022 23:47 )  37 ng/L / x     / x     / x     / x     / x          ECG: 3:1 mobitz II avb  Tele: 2:1 and 3:1 block    TTE  Dimensions:    Normal Values:  LA:     3.5    2.0 - 4.0 cm  Ao:     3.2    2.0 - 3.8 cm  SEPTUM: 1.0    0.6 - 1.2 cm  PWT:    1.0    0.6 - 1.1 cm  LVIDd:  4.5    3.0 - 5.6 cm  LVIDs:  2.9    1.8 - 4.0 cm  Derived variables:  LVMI: 85 g/m2  RWT: 0.48  Fractional short: 36 %  EF (Modified Mayers Rule): 57 %Doppler Peak Velocity  (m/sec): AoV=2.2  ------------------------------------------------------------------------  Observations:  Mitral Valve: Mitral annular calcification and calcified  mitral leaflets with normal diastolic opening. Mild mitral  regurgitation.  Aortic Valve/Aorta: Calcified trileaflet aortic valve with  normal opening. Peak transaortic valve gradient equals 20  mm Hg, mean transaortic valve gradient equals 9 mm Hg,  aortic valve velocity time integral equals 47 cm, estimated  aortic valve area equals 2.1 sqcm. No aortic valve  regurgitation seen. Peak left ventricular outflow tract  gradient equals 8 mm Hg, mean gradient is equal to 5 mm Hg,  LVOT velocity time integral equals 37 cm.  Aortic Root: 3.2 cm.  LVOT diameter: 1.7 cm.  Left Atrium: Normal left atrium.  LA volume index = 27  cc/m2.  Left Ventricle: Normal left ventricular systolic function.  No segmental wall motion abnormalities. Increased relative  wall thickness with normal left ventricular mass index,  consistent with concentric left ventricular remodeling.  Normal diastolic function.  Right Heart: Normal right atrium. Normal right ventricular  size and function. Normal tricuspid valve. Mild tricuspid  regurgitation. Normal pulmonic valve. No pulmonic  regurgitation.  Pericardium/Pleura: Normal pericardium with no pericardial  effusion.  Hemodynamic: Estimated right atrial pressure is 8 mm Hg.  Estimated right ventricular systolic pressure equals 35 mm  Hg, assuming right atrial pressure equals 8 mm Hg,  consistent with borderline pulmonary hypertension.  ------------------------------------------------------------------------  Conclusions:  1. Mitral annular calcification and calcified mitral  leaflets with normal diastolic opening. Mild mitral  regurgitation.  2. Calcified trileaflet aortic valve with normal opening.  No aortic valve regurgitation seen.  3. Normal left ventricular systolic function. No segmental  wall motion abnormalities.  4. Normal diastolic function.  5. Normal right ventricular size and function.  *** No previous Echo exam.    CT  LYMPH NODES: Mediastinal and supraclavicular lymph nodes, for reference   there is a upper tracheal lymph node with calcification that measures 3.1   x 2.9 cm (series 2 image 37), a left supraclavicular lymph node that   measures 4.1 x 2.9 cm (series 5 image 39) and a subcarinal lymph node   that measures 2.3 x 1.9 cm (series 5 image 54).  HEART/VASCULATURE: Heart size is within normal limits. No pericardial   effusion. Normal caliber aorta. Calcifications of the coronary arteries   and aorta.  AIRWAYS/LUNGS/PLEURA: The central airways are patent. There are   peripheral curvilinear opacities in the bilateral lower lobes and upper   lobes. The remainder of the lungs are clear. No pleural effusion or   pneumothorax.  UPPER ABDOMEN: Left kidney cyst.  BONES/SOFT TISSUES: Degenerative changes of the spine. No aggressive   osseous lesion.    IMPRESSION:    1.  Mediastinal and supraclavicular lymph nodes, as described above.   Blas Flores MD  Cardiology Fellow  [phone]  All Cardiology service information can be found 24/7 on [email or web address], password: [email or web address]    Patient seen and examined at bedside.  On Tele currently w 2:1 block HR 40s-50s, was in 3:1 block previously  CT overnight with marked mediastinal LN, a degree of hilar LN per discussion with radiology  Plan for ICD today, with sarcoidosis workup with tissue sampling preferable thereafter     Review Of Systems: No chest pain, shortness of breath, or palpitations            Current Meds:  acetaminophen     Tablet .. 650 milliGRAM(s) Oral every 6 hours PRN  aluminum hydroxide/magnesium hydroxide/simethicone Suspension 30 milliLiter(s) Oral every 4 hours PRN  aspirin enteric coated 325 milliGRAM(s) Oral daily  atorvastatin 80 milliGRAM(s) Oral at bedtime  atropine Injectable 1 milliGRAM(s) IV Push once PRN  clopidogrel Tablet 75 milliGRAM(s) Oral daily  dextrose 50% Injectable 25 Gram(s) IV Push once  dextrose 50% Injectable 12.5 Gram(s) IV Push once  dextrose 50% Injectable 25 Gram(s) IV Push once  dextrose Oral Gel 15 Gram(s) Oral once  finasteride 5 milliGRAM(s) Oral daily  glucagon  Injectable 1 milliGRAM(s) IntraMuscular once  influenza   Vaccine 0.5 milliLiter(s) IntraMuscular once  insulin lispro (ADMELOG) corrective regimen sliding scale   SubCutaneous three times a day before meals  insulin lispro (ADMELOG) corrective regimen sliding scale   SubCutaneous at bedtime  melatonin 3 milliGRAM(s) Oral at bedtime PRN  ondansetron Injectable 4 milliGRAM(s) IV Push every 8 hours PRN  tamsulosin 0.8 milliGRAM(s) Oral at bedtime    Vitals:  T(F): 98.8 (12-21), Max: 98.8 (12-21)  HR: 52 (12-21) (34 - 52)  BP: 162/72 (12-21) (135/73 - 181/80)  RR: 17 (12-21)  SpO2: 95% (12-21)  I&O's Summary    Physical Exam:  Appearance: No acute distress; well appearing  Eyes: PERRL, EOMI, pink conjunctiva  HEENT: Normal oral mucosa  Cardiovascular: RRR, S1, S2, bradycardic   Respiratory: Clear to auscultation bilaterally  Gastrointestinal: soft, non-tender, non-distended with normal bowel sounds  Musculoskeletal: No clubbing; no joint deformity   Neurologic: Non-focal  Lymphatic: No lymphadenopathy  Psychiatry: AAOx3, mood & affect appropriate  Skin: No rashes, ecchymoses, or cyanosis                          12.8   7.59  )-----------( 129      ( 21 Dec 2022 06:58 )             40.4     12-21    145  |  109<H>  |  31<H>  ----------------------------<  201<H>  3.3<L>   |  23  |  1.50<H>    Ca    8.9      21 Dec 2022 06:57  Phos  2.7     12-21  Mg     2.0     12-21    TPro  6.4  /  Alb  3.6  /  TBili  0.2  /  DBili  x   /  AST  15  /  ALT  17  /  AlkPhos  55  12-21    PT/INR - ( 21 Dec 2022 06:58 )   PT: 12.9 sec;   INR: 1.11 ratio         PTT - ( 21 Dec 2022 06:58 )  PTT:28.4 sec  CARDIAC MARKERS ( 20 Dec 2022 03:21 )  42 ng/L / x     / x     / x     / x     / x      CARDIAC MARKERS ( 19 Dec 2022 23:47 )  37 ng/L / x     / x     / x     / x     / x          ECG: 3:1 mobitz II avb  Tele: 2:1 and 3:1 block    TTE  Dimensions:    Normal Values:  LA:     3.5    2.0 - 4.0 cm  Ao:     3.2    2.0 - 3.8 cm  SEPTUM: 1.0    0.6 - 1.2 cm  PWT:    1.0    0.6 - 1.1 cm  LVIDd:  4.5    3.0 - 5.6 cm  LVIDs:  2.9    1.8 - 4.0 cm  Derived variables:  LVMI: 85 g/m2  RWT: 0.48  Fractional short: 36 %  EF (Modified Mayers Rule): 57 %Doppler Peak Velocity  (m/sec): AoV=2.2  ------------------------------------------------------------------------  Observations:  Mitral Valve: Mitral annular calcification and calcified  mitral leaflets with normal diastolic opening. Mild mitral  regurgitation.  Aortic Valve/Aorta: Calcified trileaflet aortic valve with  normal opening. Peak transaortic valve gradient equals 20  mm Hg, mean transaortic valve gradient equals 9 mm Hg,  aortic valve velocity time integral equals 47 cm, estimated  aortic valve area equals 2.1 sqcm. No aortic valve  regurgitation seen. Peak left ventricular outflow tract  gradient equals 8 mm Hg, mean gradient is equal to 5 mm Hg,  LVOT velocity time integral equals 37 cm.  Aortic Root: 3.2 cm.  LVOT diameter: 1.7 cm.  Left Atrium: Normal left atrium.  LA volume index = 27  cc/m2.  Left Ventricle: Normal left ventricular systolic function.  No segmental wall motion abnormalities. Increased relative  wall thickness with normal left ventricular mass index,  consistent with concentric left ventricular remodeling.  Normal diastolic function.  Right Heart: Normal right atrium. Normal right ventricular  size and function. Normal tricuspid valve. Mild tricuspid  regurgitation. Normal pulmonic valve. No pulmonic  regurgitation.  Pericardium/Pleura: Normal pericardium with no pericardial  effusion.  Hemodynamic: Estimated right atrial pressure is 8 mm Hg.  Estimated right ventricular systolic pressure equals 35 mm  Hg, assuming right atrial pressure equals 8 mm Hg,  consistent with borderline pulmonary hypertension.  ------------------------------------------------------------------------  Conclusions:  1. Mitral annular calcification and calcified mitral  leaflets with normal diastolic opening. Mild mitral  regurgitation.  2. Calcified trileaflet aortic valve with normal opening.  No aortic valve regurgitation seen.  3. Normal left ventricular systolic function. No segmental  wall motion abnormalities.  4. Normal diastolic function.  5. Normal right ventricular size and function.  *** No previous Echo exam.    CT  LYMPH NODES: Mediastinal and supraclavicular lymph nodes, for reference   there is a upper tracheal lymph node with calcification that measures 3.1   x 2.9 cm (series 2 image 37), a left supraclavicular lymph node that   measures 4.1 x 2.9 cm (series 5 image 39) and a subcarinal lymph node   that measures 2.3 x 1.9 cm (series 5 image 54).  HEART/VASCULATURE: Heart size is within normal limits. No pericardial   effusion. Normal caliber aorta. Calcifications of the coronary arteries   and aorta.  AIRWAYS/LUNGS/PLEURA: The central airways are patent. There are   peripheral curvilinear opacities in the bilateral lower lobes and upper   lobes. The remainder of the lungs are clear. No pleural effusion or   pneumothorax.  UPPER ABDOMEN: Left kidney cyst.  BONES/SOFT TISSUES: Degenerative changes of the spine. No aggressive   osseous lesion.    IMPRESSION:    1.  Mediastinal and supraclavicular lymph nodes, as described above.   Blas Flores MD  Cardiology Fellow  [phone]  All Cardiology service information can be found 24/7 on [email or web address], password: [email or web address]    Patient seen and examined at bedside.    On Tele currently w 2:1 block HR 40s-50s, was in 3:1 block previously  CT overnight with marked mediastinal LN, a degree of hilar LN per discussion with radiology  Plan for ICD today, with sarcoidosis workup with tissue sampling     Review Of Systems: No chest pain, shortness of breath, or palpitations            Current Meds:  acetaminophen     Tablet .. 650 milliGRAM(s) Oral every 6 hours PRN  aluminum hydroxide/magnesium hydroxide/simethicone Suspension 30 milliLiter(s) Oral every 4 hours PRN  aspirin enteric coated 325 milliGRAM(s) Oral daily  atorvastatin 80 milliGRAM(s) Oral at bedtime  atropine Injectable 1 milliGRAM(s) IV Push once PRN  clopidogrel Tablet 75 milliGRAM(s) Oral daily  finasteride 5 milliGRAM(s) Oral daily  influenza   Vaccine 0.5 milliLiter(s) IntraMuscular once  insulin lispro (ADMELOG) corrective regimen sliding scale   SubCutaneous three times a day before meals  insulin lispro (ADMELOG) corrective regimen sliding scale   SubCutaneous at bedtime  melatonin 3 milliGRAM(s) Oral at bedtime PRN  ondansetron Injectable 4 milliGRAM(s) IV Push every 8 hours PRN  tamsulosin 0.8 milliGRAM(s) Oral at bedtime    Vitals:  T(F): 98.8 (12-21), Max: 98.8 (12-21)  HR: 52 (12-21) (34 - 52)  BP: 162/72 (12-21) (135/73 - 181/80)  RR: 17 (12-21)  SpO2: 95% (12-21)  I&O's Summary    Physical Exam:  Appearance: No acute distress; well appearing  Eyes: PERRL, EOMI, pink conjunctiva  HEENT: Normal oral mucosa  Cardiovascular: RRR, S1, S2, bradycardic   Respiratory: Clear to auscultation bilaterally  Gastrointestinal: soft, non-tender, non-distended with normal bowel sounds  Musculoskeletal: No clubbing; no joint deformity   Neurologic: Non-focal  Lymphatic: No lymphadenopathy  Psychiatry: AAOx3, mood & affect appropriate  Skin: No rashes, ecchymoses, or cyanosis                          12.8   7.59  )-----------( 129      ( 21 Dec 2022 06:58 )             40.4     12-21    145  |  109<H>  |  31<H>  ----------------------------<  201<H>  3.3<L>   |  23  |  1.50<H>    Ca    8.9      21 Dec 2022 06:57  Phos  2.7     12-21  Mg     2.0     12-21    TPro  6.4  /  Alb  3.6  /  TBili  0.2  /  DBili  x   /  AST  15  /  ALT  17  /  AlkPhos  55  12-21    PT/INR - ( 21 Dec 2022 06:58 )   PT: 12.9 sec;   INR: 1.11 ratio    PTT - ( 21 Dec 2022 06:58 )  PTT:28.4 sec    CARDIAC MARKERS ( 20 Dec 2022 03:21 )  42 ng/L / x     / x     / x     / x     / x      CARDIAC MARKERS ( 19 Dec 2022 23:47 )  37 ng/L / x     / x     / x     / x     / x        ECG: 3:1 mobitz II avb  Tele: 2:1 and 3:1 block    TTE  Dimensions:    Normal Values:  LA:     3.5    2.0 - 4.0 cm  Ao:     3.2    2.0 - 3.8 cm  SEPTUM: 1.0    0.6 - 1.2 cm  PWT:    1.0    0.6 - 1.1 cm  LVIDd:  4.5    3.0 - 5.6 cm  LVIDs:  2.9    1.8 - 4.0 cm  Derived variables:  LVMI: 85 g/m2  RWT: 0.48  Fractional short: 36 %  EF (Modified Mayers Rule): 57 %Doppler Peak Velocity  (m/sec): AoV=2.2  ------------------------------------------------------------------------  Observations: Mitral Valve: Mitral annular calcification and calcified mitral leaflets with normal diastolic opening. Mild mitral regurgitation. Aortic Valve/Aorta: Calcified trileaflet aortic valve with normal opening. Peak transaortic valve gradient equals 20 mm Hg, mean transaortic valve gradient equals 9 mm Hg, aortic valve velocity time integral equals 47 cm, estimated aortic valve area equals 2.1 sqcm. No aortic valve regurgitation seen. Peak left ventricular outflow tract  gradient equals 8 mm Hg, mean gradient is equal to 5 mm Hg, LVOT velocity time integral equals 37 cm. Aortic Root: 3.2 cm. LVOT diameter: 1.7 cm. Left Atrium: Normal left atrium.  LA volume index = 27 cc/m2. Left Ventricle: Normal left ventricular systolic function. No segmental wall motion abnormalities. Increased relative wall thickness with normal left ventricular mass index, consistent with concentric left ventricular remodeling. Normal diastolic function. Right Heart: Normal right atrium. Normal right ventricular size and function. Normal tricuspid valve. Mild tricuspid regurgitation. Normal pulmonic valve. No pulmonic regurgitation. Pericardium/Pleura: Normal pericardium with no pericardial  effusion. Hemodynamic: Estimated right atrial pressure is 8 mm Hg. Estimated right ventricular systolic pressure equals 35 mm Hg, assuming right atrial pressure equals 8 mm Hg, consistent with borderline pulmonary hypertension. ------------------------------------------------------------------------ Conclusions: 1. Mitral annular calcification and calcified mitral leaflets with normal diastolic opening. Mild mitral regurgitation. 2. Calcified trileaflet aortic valve with normal opening. No aortic valve regurgitation seen. 3. Normal left ventricular systolic function. No segmental wall motion abnormalities. 4. Normal diastolic function. 5. Normal right ventricular size and function. *** No previous Echo exam.     CT LYMPH NODES: Mediastinal and supraclavicular lymph nodes, for reference  there is a upper tracheal lymph node with calcification that measures 3.1  x 2.9 cm (series 2 image 37), a left supraclavicular lymph node that  measures 4.1 x 2.9 cm (series 5 image 39) and a subcarinal lymph node  that measures 2.3 x 1.9 cm (series 5 image 54). HEART/VASCULATURE: Heart size is within normal limits. No pericardial  effusion. Normal caliber aorta. Calcifications of the coronary arteries  and aorta. AIRWAYS/LUNGS/PLEURA: The central airways are patent. There are  peripheral curvilinear opacities in the bilateral lower lobes and upper  lobes. The remainder of the lungs are clear. No pleural effusion or  pneumothorax. UPPER ABDOMEN: Left kidney cyst. BONES/SOFT TISSUES: Degenerative changes of the spine. No aggressive  osseous lesion. IMPRESSION: 1.  Mediastinal and supraclavicular lymph nodes, as described above.

## 2022-12-21 NOTE — CONSULT NOTE ADULT - ASSESSMENT
58 yo M PMH HTN, HLD, DM2, prior stroke in 2012 (with residual left sided numbness) transferred from OSH w/ bradycardia 2/2 Mobitz type II AV block found to have incidental enlarged lymph nodes. Rheumatology consulted to evaluate for sarcoidosis.     #New Mobitz type II AV block   #lymph node enlargement  =CT showing incidental mediastinal and supraclavicular lymph nodes (upper tracheal lymph node with calcification that measures 3.1  x 2.9 cm), left supraclavicular lymph node (4.1 x 2.9 cm), subcarinal lymph node (2.3 x 1.9 cm)  =Pt without any stigmata of autoimmune disease or sarcoid on history or exam, no hypercalcemia/transaminitis    Plan:   -core biopsy of left supraclavicular LN already performed, if unrevealing, can consider an excisional biopsy  -will obtain vitamin D 1,25, Vitamin D 2,5, and ACE level  -since unable to perform cardiac MRI as reported metal in arm, can consider cardiac PET scan if want to evaluate for sarcoid involvement of heart  -will follow    Discussed with Attending Dr. Sony Santoro DO  Rheumatology Fellow  Pager: [phone]  Available on TEAMS

## 2022-12-21 NOTE — PROGRESS NOTE ADULT - SUBJECTIVE AND OBJECTIVE BOX
Neurology Progress Note    S: Patient seen and examined. No new events overnight. patient denied CP, SOB, HA or pain.     Medication:  acetaminophen     Tablet .. 650 milliGRAM(s) Oral every 6 hours PRN  aluminum hydroxide/magnesium hydroxide/simethicone Suspension 30 milliLiter(s) Oral every 4 hours PRN  aspirin enteric coated 81 milliGRAM(s) Oral daily  atorvastatin 80 milliGRAM(s) Oral at bedtime  atropine Injectable 1 milliGRAM(s) IV Push once PRN  chlorhexidine 2% Cloths 1 Application(s) Topical <User Schedule>  clopidogrel Tablet 75 milliGRAM(s) Oral daily  dextrose 50% Injectable 25 Gram(s) IV Push once  dextrose 50% Injectable 12.5 Gram(s) IV Push once  dextrose 50% Injectable 25 Gram(s) IV Push once  dextrose Oral Gel 15 Gram(s) Oral once  finasteride 5 milliGRAM(s) Oral daily  glucagon  Injectable 1 milliGRAM(s) IntraMuscular once  influenza   Vaccine 0.5 milliLiter(s) IntraMuscular once  insulin lispro (ADMELOG) corrective regimen sliding scale   SubCutaneous three times a day before meals  insulin lispro (ADMELOG) corrective regimen sliding scale   SubCutaneous at bedtime  melatonin 3 milliGRAM(s) Oral at bedtime PRN  ondansetron Injectable 4 milliGRAM(s) IV Push every 8 hours PRN  potassium chloride   Solution 40 milliEquivalent(s) Oral once  tamsulosin 0.8 milliGRAM(s) Oral at bedtime      Vitals:  Vital Signs Last 24 Hrs  T(C): 37.1 (21 Dec 2022 14:56), Max: 37.1 (21 Dec 2022 04:23)  T(F): 98.8 (21 Dec 2022 14:56), Max: 98.8 (21 Dec 2022 04:23)  HR: 51 (21 Dec 2022 14:56) (35 - 62)  BP: 169/75 (21 Dec 2022 14:56) (135/73 - 181/80)  BP(mean): 83 (21 Dec 2022 13:34) (83 - 83)  RR: 14 (21 Dec 2022 14:56) (14 - 18)  SpO2: 95% (21 Dec 2022 14:56) (95% - 98%)    Parameters below as of 21 Dec 2022 04:23  Patient On (Oxygen Delivery Method): room air        General Exam:   General Appearance: Appropriately dressed and in no acute distress       Head: Normocephalic, atraumatic and no dysmorphic features  Ear, Nose, and Throat: Moist mucous membranes  CVS: S1S2+  Resp: No SOB, no wheeze or rhonchi  Abd: soft NTND  Extremities: No edema, no cyanosis  Skin: No bruises, no rashes     Neurological Exam:  MS: Eyes open, slightly drowsy but arouseable, oriented to person, place, situation, time. Normal affect. Follows all commands.    Speech/Language: Clear, fluent with good repetition. Naming intact.    CNs: Pupils reactive OU (3mm OD, 3.5mm OS). Blink to threat intact b/l. EOMI, no nystagmus, no diplopia. V1-3 intact to LT b/l, well developed masseter muscles b/l. No facial asymmetry b/l, full eye closure strength b/l. Hearing grossly normal (rubbing fingers) b/l. Symmetric palate elevation in midline, no uvula deviation. Gag reflex deferred. Head turning & shoulder shrug intact b/l. Tongue midline, normal movements.    Motor: Muscle bulk and tone are normal. There is no pronator drift.     RUE/LUE: 5/5 shoulder abductors, elbow flexors/extensors, wrist flexors/extensors, and finger .      RLE/LLE: 5/5 hip flexors, knee flexors/extensors, ankle dorsiflexors and plantarflexors.    Sensation: Per patient, minimally decreased to pinprick in the LUE/LLE (99%) compared to the RUE/RLE (100%).    Cortical: No hemineglect, no extinction to double sided stimulation (tactile).    Reflexes: 2+ biceps and brachioradialis b/l. 1+ patellars and achilles b/l. Plantar response flexor b/l.     Coordination: Intact rapid-alt movements. Fast finger tapping with normal amplitude and speed. No dysmetria to FTN/HTS b/l.    Gait: Not assessed due to risk of fall (patient reporting lightheadedness at time of evaluation).     I personally reviewed the below data/images/labs:      CBC Full  -  ( 21 Dec 2022 06:58 )  WBC Count : 7.59 K/uL  RBC Count : 5.02 M/uL  Hemoglobin : 12.8 g/dL  Hematocrit : 40.4 %  Platelet Count - Automated : 129 K/uL  Mean Cell Volume : 80.5 fl  Mean Cell Hemoglobin : 25.5 pg  Mean Cell Hemoglobin Concentration : 31.7 gm/dL  Auto Neutrophil # : 5.39 K/uL  Auto Lymphocyte # : 1.37 K/uL  Auto Monocyte # : 0.65 K/uL  Auto Eosinophil # : 0.11 K/uL  Auto Basophil # : 0.03 K/uL  Auto Neutrophil % : 71.0 %  Auto Lymphocyte % : 18.1 %  Auto Monocyte % : 8.6 %  Auto Eosinophil % : 1.4 %  Auto Basophil % : 0.4 %    12-21    145  |  109<H>  |  31<H>  ----------------------------<  201<H>  3.3<L>   |  23  |  1.50<H>    Ca    8.9      21 Dec 2022 06:57  Phos  2.7     12-21  Mg     2.0     12-21    TPro  6.4  /  Alb  3.6  /  TBili  0.2  /  DBili  x   /  AST  15  /  ALT  17  /  AlkPhos  55  12-21    LIVER FUNCTIONS - ( 21 Dec 2022 06:57 )  Alb: 3.6 g/dL / Pro: 6.4 g/dL / ALK PHOS: 55 U/L / ALT: 17 U/L / AST: 15 U/L / GGT: x           PT/INR - ( 21 Dec 2022 06:58 )   PT: 12.9 sec;   INR: 1.11 ratio         PTT - ( 21 Dec 2022 06:58 )  PTT:28.4 sec      CT Head No Cont:  (20 Dec 2022 01:26)  No acute intracranial hemorrhage, midline shift, or hydrocephalus. Chronic small vessel ischemic changes, including chronic appearing small right cerebellar infarct.      CT Angio Head & Neck w/ IV Cont (12.20.22 @ 01:26)    CTA BRAIN: Complete occlusion of the right distal V3 vertebral artery segment and multifocal occlusions with segmental opacification of the right intracranial V4 segment extending to the basilar confluence, the chronicity of which is unknown. Further workup with MRA may be helpful in the evaluation for dissection. Multifocal severe narrowing of the left V4 segment.  Atherosclerotic changes with moderate stenosis of the right carotid siphon and mild stenosis of the left carotid siphon.    CTA NECK: Severe atherosclerotic narrowing at the origin of the left vertebral artery and mild to moderate narrowing involving both vertebral artery V2 segments. Noncalcified plaque with less than 50% stenosis at the right proximal cervical ICA by NASCET criteria.

## 2022-12-21 NOTE — PROGRESS NOTE ADULT - PROBLEM SELECTOR PLAN 2
-c/w atorvastatin  -CT angio head:  CTA BRAIN: Complete occlusion of the right distal V3 vertebral artery segment and multifocal occlusions with segmental opacification of the right intracranial V4 segment extending to the basilar confluence, the   chronicity of which is unknown. Further workup with MRA may be helpful in the evaluation for dissection. Multifocal severe narrowing of the left V4 segment.    Atherosclerotic changes with moderate stenosis of the right carotid siphon and mild stenosis of the left carotid siphon.    CTA NECK: Severe atherosclerotic narrowing at the origin of the left vertebral artery and mild to moderate narrowing involving both vertebral artery V2 segments. Noncalcified plaque with less than 50% stenosis at   the right proximal cervical ICA by NASCET criteria. -LAD seen on CT noncontrast   -pulm c/s, possible biopsy through ebus?  -rheum c/s

## 2022-12-21 NOTE — PROGRESS NOTE ADULT - ASSESSMENT
58yo male with PMH of prior CVA with mild left sided deficits, HTN, DM, vertigo and HLD  presents with Mobitz II 3:1 AVB, symptomatic. TTE nml  Secondary workup most remarkable for CT mediastinal and supraclavicular LN, raising concern for Sarcoidosis    Plan:   - Continue telemetry monitoring  - NPO, ICD today   - Patient was unable to obtain cMRI 2/2 metal in his arm. CT with mediastinal LN. Would consult pulm (for consideration EBUS, biopsy) and rheum for sarcoid concerns  - Patient has no indication for prolonged high dose asa 325mg, would reduce to 81mg daily     Blas Flores, PGy-4  Cardiology Fellow

## 2022-12-21 NOTE — PROGRESS NOTE ADULT - PROBLEM SELECTOR PLAN 3
holding home amlodipine/losartan in the setting of LAURA -c/w atorvastatin  -CT angio head:  CTA BRAIN: Complete occlusion of the right distal V3 vertebral artery segment and multifocal occlusions with segmental opacification of the right intracranial V4 segment extending to the basilar confluence, the   chronicity of which is unknown. Further workup with MRA may be helpful in the evaluation for dissection. Multifocal severe narrowing of the left V4 segment.    Atherosclerotic changes with moderate stenosis of the right carotid siphon and mild stenosis of the left carotid siphon.    CTA NECK: Severe atherosclerotic narrowing at the origin of the left vertebral artery and mild to moderate narrowing involving both vertebral artery V2 segments. Noncalcified plaque with less than 50% stenosis at   the right proximal cervical ICA by NASCET criteria.

## 2022-12-22 ENCOUNTER — TRANSCRIPTION ENCOUNTER (OUTPATIENT)
Age: 57
End: 2022-12-22

## 2022-12-22 ENCOUNTER — NON-APPOINTMENT (OUTPATIENT)
Age: 57
End: 2022-12-22

## 2022-12-22 DIAGNOSIS — A41.9 SEPSIS, UNSPECIFIED ORGANISM: ICD-10-CM

## 2022-12-22 DIAGNOSIS — I44.1 ATRIOVENTRICULAR BLOCK, SECOND DEGREE: ICD-10-CM

## 2022-12-22 LAB
24R-OH-CALCIDIOL SERPL-MCNC: 33.6 NG/ML — SIGNIFICANT CHANGE UP (ref 30–80)
ALBUMIN SERPL ELPH-MCNC: 3.9 G/DL — SIGNIFICANT CHANGE UP (ref 3.3–5)
ALBUMIN SERPL ELPH-MCNC: 4.1 G/DL — SIGNIFICANT CHANGE UP (ref 3.3–5)
ALP SERPL-CCNC: 61 U/L — SIGNIFICANT CHANGE UP (ref 40–120)
ALP SERPL-CCNC: 62 U/L — SIGNIFICANT CHANGE UP (ref 40–120)
ALT FLD-CCNC: 14 U/L — SIGNIFICANT CHANGE UP (ref 10–45)
ALT FLD-CCNC: 15 U/L — SIGNIFICANT CHANGE UP (ref 10–45)
ANION GAP SERPL CALC-SCNC: 13 MMOL/L — SIGNIFICANT CHANGE UP (ref 5–17)
ANION GAP SERPL CALC-SCNC: 13 MMOL/L — SIGNIFICANT CHANGE UP (ref 5–17)
ANION GAP SERPL CALC-SCNC: 15 MMOL/L — SIGNIFICANT CHANGE UP (ref 5–17)
APPEARANCE UR: CLEAR — SIGNIFICANT CHANGE UP
AST SERPL-CCNC: 15 U/L — SIGNIFICANT CHANGE UP (ref 10–40)
AST SERPL-CCNC: 19 U/L — SIGNIFICANT CHANGE UP (ref 10–40)
BACTERIA # UR AUTO: NEGATIVE — SIGNIFICANT CHANGE UP
BASE EXCESS BLDV CALC-SCNC: 1.8 MMOL/L — SIGNIFICANT CHANGE UP (ref -2–3)
BASOPHILS # BLD AUTO: 0.02 K/UL — SIGNIFICANT CHANGE UP (ref 0–0.2)
BASOPHILS NFR BLD AUTO: 0.2 % — SIGNIFICANT CHANGE UP (ref 0–2)
BILIRUB SERPL-MCNC: 0.5 MG/DL — SIGNIFICANT CHANGE UP (ref 0.2–1.2)
BILIRUB SERPL-MCNC: 0.6 MG/DL — SIGNIFICANT CHANGE UP (ref 0.2–1.2)
BILIRUB UR-MCNC: NEGATIVE — SIGNIFICANT CHANGE UP
BUN SERPL-MCNC: 23 MG/DL — SIGNIFICANT CHANGE UP (ref 7–23)
BUN SERPL-MCNC: 24 MG/DL — HIGH (ref 7–23)
BUN SERPL-MCNC: 24 MG/DL — HIGH (ref 7–23)
CA-I SERPL-SCNC: 1.19 MMOL/L — SIGNIFICANT CHANGE UP (ref 1.15–1.33)
CALCIUM SERPL-MCNC: 8.8 MG/DL — SIGNIFICANT CHANGE UP (ref 8.4–10.5)
CALCIUM SERPL-MCNC: 9 MG/DL — SIGNIFICANT CHANGE UP (ref 8.4–10.5)
CALCIUM SERPL-MCNC: 9 MG/DL — SIGNIFICANT CHANGE UP (ref 8.4–10.5)
CHLORIDE BLDV-SCNC: 103 MMOL/L — SIGNIFICANT CHANGE UP (ref 96–108)
CHLORIDE SERPL-SCNC: 104 MMOL/L — SIGNIFICANT CHANGE UP (ref 96–108)
CO2 BLDV-SCNC: 27 MMOL/L — HIGH (ref 22–26)
CO2 SERPL-SCNC: 21 MMOL/L — LOW (ref 22–31)
CO2 SERPL-SCNC: 22 MMOL/L — SIGNIFICANT CHANGE UP (ref 22–31)
CO2 SERPL-SCNC: 23 MMOL/L — SIGNIFICANT CHANGE UP (ref 22–31)
COLOR SPEC: SIGNIFICANT CHANGE UP
CREAT SERPL-MCNC: 1.25 MG/DL — SIGNIFICANT CHANGE UP (ref 0.5–1.3)
CREAT SERPL-MCNC: 1.35 MG/DL — HIGH (ref 0.5–1.3)
CREAT SERPL-MCNC: 1.39 MG/DL — HIGH (ref 0.5–1.3)
CRP SERPL-MCNC: 47 MG/L — HIGH (ref 0–4)
DIFF PNL FLD: NEGATIVE — SIGNIFICANT CHANGE UP
EGFR: 60 ML/MIN/1.73M2 — SIGNIFICANT CHANGE UP
EGFR: 62 ML/MIN/1.73M2 — SIGNIFICANT CHANGE UP
EGFR: 68 ML/MIN/1.73M2 — SIGNIFICANT CHANGE UP
EOSINOPHIL # BLD AUTO: 0.03 K/UL — SIGNIFICANT CHANGE UP (ref 0–0.5)
EOSINOPHIL NFR BLD AUTO: 0.3 % — SIGNIFICANT CHANGE UP (ref 0–6)
EPI CELLS # UR: 0 /HPF — SIGNIFICANT CHANGE UP
ERYTHROCYTE [SEDIMENTATION RATE] IN BLOOD: 39 MM/HR — HIGH (ref 0–20)
GAS PNL BLDV: 136 MMOL/L — SIGNIFICANT CHANGE UP (ref 136–145)
GAS PNL BLDV: SIGNIFICANT CHANGE UP
GAS PNL BLDV: SIGNIFICANT CHANGE UP
GLUCOSE BLDC GLUCOMTR-MCNC: 216 MG/DL — HIGH (ref 70–99)
GLUCOSE BLDC GLUCOMTR-MCNC: 292 MG/DL — HIGH (ref 70–99)
GLUCOSE BLDC GLUCOMTR-MCNC: 303 MG/DL — HIGH (ref 70–99)
GLUCOSE BLDC GLUCOMTR-MCNC: 318 MG/DL — HIGH (ref 70–99)
GLUCOSE BLDV-MCNC: 325 MG/DL — HIGH (ref 70–99)
GLUCOSE SERPL-MCNC: 318 MG/DL — HIGH (ref 70–99)
GLUCOSE SERPL-MCNC: 326 MG/DL — HIGH (ref 70–99)
GLUCOSE SERPL-MCNC: 362 MG/DL — HIGH (ref 70–99)
GLUCOSE UR QL: ABNORMAL
HCO3 BLDV-SCNC: 26 MMOL/L — SIGNIFICANT CHANGE UP (ref 22–29)
HCT VFR BLD CALC: 41.6 % — SIGNIFICANT CHANGE UP (ref 39–50)
HCT VFR BLD CALC: 43 % — SIGNIFICANT CHANGE UP (ref 39–50)
HCT VFR BLDA CALC: 41 % — SIGNIFICANT CHANGE UP (ref 39–51)
HGB BLD CALC-MCNC: 13.6 G/DL — SIGNIFICANT CHANGE UP (ref 12.6–17.4)
HGB BLD-MCNC: 13.4 G/DL — SIGNIFICANT CHANGE UP (ref 13–17)
HGB BLD-MCNC: 13.9 G/DL — SIGNIFICANT CHANGE UP (ref 13–17)
HYALINE CASTS # UR AUTO: 0 /LPF — SIGNIFICANT CHANGE UP (ref 0–2)
IMM GRANULOCYTES NFR BLD AUTO: 0.4 % — SIGNIFICANT CHANGE UP (ref 0–0.9)
KETONES UR-MCNC: NEGATIVE — SIGNIFICANT CHANGE UP
LACTATE BLDV-MCNC: 1.5 MMOL/L — SIGNIFICANT CHANGE UP (ref 0.5–2)
LACTATE SERPL-SCNC: 1.2 MMOL/L — SIGNIFICANT CHANGE UP (ref 0.5–2)
LEUKOCYTE ESTERASE UR-ACNC: NEGATIVE — SIGNIFICANT CHANGE UP
LYMPHOCYTES # BLD AUTO: 0.61 K/UL — LOW (ref 1–3.3)
LYMPHOCYTES # BLD AUTO: 6.3 % — LOW (ref 13–44)
MAGNESIUM SERPL-MCNC: 1.9 MG/DL — SIGNIFICANT CHANGE UP (ref 1.6–2.6)
MAGNESIUM SERPL-MCNC: 2 MG/DL — SIGNIFICANT CHANGE UP (ref 1.6–2.6)
MCHC RBC-ENTMCNC: 25.4 PG — LOW (ref 27–34)
MCHC RBC-ENTMCNC: 25.6 PG — LOW (ref 27–34)
MCHC RBC-ENTMCNC: 32.2 GM/DL — SIGNIFICANT CHANGE UP (ref 32–36)
MCHC RBC-ENTMCNC: 32.3 GM/DL — SIGNIFICANT CHANGE UP (ref 32–36)
MCV RBC AUTO: 78.5 FL — LOW (ref 80–100)
MCV RBC AUTO: 79.4 FL — LOW (ref 80–100)
MONOCYTES # BLD AUTO: 0.8 K/UL — SIGNIFICANT CHANGE UP (ref 0–0.9)
MONOCYTES NFR BLD AUTO: 8.3 % — SIGNIFICANT CHANGE UP (ref 2–14)
MRSA PCR RESULT.: SIGNIFICANT CHANGE UP
NEUTROPHILS # BLD AUTO: 8.13 K/UL — HIGH (ref 1.8–7.4)
NEUTROPHILS NFR BLD AUTO: 84.5 % — HIGH (ref 43–77)
NITRITE UR-MCNC: NEGATIVE — SIGNIFICANT CHANGE UP
NRBC # BLD: 0 /100 WBCS — SIGNIFICANT CHANGE UP (ref 0–0)
NRBC # BLD: 0 /100 WBCS — SIGNIFICANT CHANGE UP (ref 0–0)
OSMOLALITY SERPL: 307 MOSMOL/KG — HIGH (ref 275–300)
PCO2 BLDV: 37 MMHG — LOW (ref 42–55)
PH BLDV: 7.45 — HIGH (ref 7.32–7.43)
PH UR: 6 — SIGNIFICANT CHANGE UP (ref 5–8)
PHOSPHATE SERPL-MCNC: 2.2 MG/DL — LOW (ref 2.5–4.5)
PHOSPHATE SERPL-MCNC: 2.4 MG/DL — LOW (ref 2.5–4.5)
PLATELET # BLD AUTO: 124 K/UL — LOW (ref 150–400)
PLATELET # BLD AUTO: 149 K/UL — LOW (ref 150–400)
PO2 BLDV: 82 MMHG — HIGH (ref 25–45)
POTASSIUM BLDV-SCNC: 3.3 MMOL/L — LOW (ref 3.5–5.1)
POTASSIUM SERPL-MCNC: 3.2 MMOL/L — LOW (ref 3.5–5.3)
POTASSIUM SERPL-MCNC: 3.4 MMOL/L — LOW (ref 3.5–5.3)
POTASSIUM SERPL-MCNC: 3.5 MMOL/L — SIGNIFICANT CHANGE UP (ref 3.5–5.3)
POTASSIUM SERPL-SCNC: 3.2 MMOL/L — LOW (ref 3.5–5.3)
POTASSIUM SERPL-SCNC: 3.4 MMOL/L — LOW (ref 3.5–5.3)
POTASSIUM SERPL-SCNC: 3.5 MMOL/L — SIGNIFICANT CHANGE UP (ref 3.5–5.3)
PROCALCITONIN SERPL-MCNC: 0.1 NG/ML — SIGNIFICANT CHANGE UP (ref 0.02–0.1)
PROT SERPL-MCNC: 6.6 G/DL — SIGNIFICANT CHANGE UP (ref 6–8.3)
PROT SERPL-MCNC: 6.6 G/DL — SIGNIFICANT CHANGE UP (ref 6–8.3)
PROT UR-MCNC: ABNORMAL
RAPID RVP RESULT: SIGNIFICANT CHANGE UP
RBC # BLD: 5.24 M/UL — SIGNIFICANT CHANGE UP (ref 4.2–5.8)
RBC # BLD: 5.48 M/UL — SIGNIFICANT CHANGE UP (ref 4.2–5.8)
RBC # FLD: 14.8 % — HIGH (ref 10.3–14.5)
RBC # FLD: 14.9 % — HIGH (ref 10.3–14.5)
RBC CASTS # UR COMP ASSIST: 0 /HPF — SIGNIFICANT CHANGE UP (ref 0–4)
S AUREUS DNA NOSE QL NAA+PROBE: SIGNIFICANT CHANGE UP
SAO2 % BLDV: 96.3 % — HIGH (ref 67–88)
SARS-COV-2 RNA SPEC QL NAA+PROBE: SIGNIFICANT CHANGE UP
SODIUM SERPL-SCNC: 139 MMOL/L — SIGNIFICANT CHANGE UP (ref 135–145)
SODIUM SERPL-SCNC: 140 MMOL/L — SIGNIFICANT CHANGE UP (ref 135–145)
SODIUM SERPL-SCNC: 140 MMOL/L — SIGNIFICANT CHANGE UP (ref 135–145)
SP GR SPEC: 1.03 — HIGH (ref 1.01–1.02)
UROBILINOGEN FLD QL: NEGATIVE — SIGNIFICANT CHANGE UP
WBC # BLD: 11.11 K/UL — HIGH (ref 3.8–10.5)
WBC # BLD: 9.63 K/UL — SIGNIFICANT CHANGE UP (ref 3.8–10.5)
WBC # FLD AUTO: 11.11 K/UL — HIGH (ref 3.8–10.5)
WBC # FLD AUTO: 9.63 K/UL — SIGNIFICANT CHANGE UP (ref 3.8–10.5)
WBC UR QL: 0 /HPF — SIGNIFICANT CHANGE UP (ref 0–5)

## 2022-12-22 PROCEDURE — 99231 SBSQ HOSP IP/OBS SF/LOW 25: CPT

## 2022-12-22 PROCEDURE — 70450 CT HEAD/BRAIN W/O DYE: CPT | Mod: 26

## 2022-12-22 PROCEDURE — 99233 SBSQ HOSP IP/OBS HIGH 50: CPT

## 2022-12-22 PROCEDURE — 71045 X-RAY EXAM CHEST 1 VIEW: CPT | Mod: 26

## 2022-12-22 RX ORDER — SODIUM CHLORIDE 9 MG/ML
1000 INJECTION, SOLUTION INTRAVENOUS
Refills: 0 | Status: DISCONTINUED | OUTPATIENT
Start: 2022-12-22 | End: 2022-12-27

## 2022-12-22 RX ORDER — INSULIN LISPRO 100/ML
VIAL (ML) SUBCUTANEOUS
Refills: 0 | Status: DISCONTINUED | OUTPATIENT
Start: 2022-12-22 | End: 2022-12-22

## 2022-12-22 RX ORDER — PIPERACILLIN AND TAZOBACTAM 4; .5 G/20ML; G/20ML
3.38 INJECTION, POWDER, LYOPHILIZED, FOR SOLUTION INTRAVENOUS ONCE
Refills: 0 | Status: COMPLETED | OUTPATIENT
Start: 2022-12-22 | End: 2022-12-22

## 2022-12-22 RX ORDER — POTASSIUM CHLORIDE 20 MEQ
40 PACKET (EA) ORAL EVERY 4 HOURS
Refills: 0 | Status: COMPLETED | OUTPATIENT
Start: 2022-12-22 | End: 2022-12-22

## 2022-12-22 RX ORDER — VANCOMYCIN HCL 1 G
VIAL (EA) INTRAVENOUS
Refills: 0 | Status: DISCONTINUED | OUTPATIENT
Start: 2022-12-22 | End: 2022-12-22

## 2022-12-22 RX ORDER — INSULIN LISPRO 100/ML
VIAL (ML) SUBCUTANEOUS AT BEDTIME
Refills: 0 | Status: DISCONTINUED | OUTPATIENT
Start: 2022-12-22 | End: 2022-12-22

## 2022-12-22 RX ORDER — AMLODIPINE BESYLATE 2.5 MG/1
10 TABLET ORAL DAILY
Refills: 0 | Status: DISCONTINUED | OUTPATIENT
Start: 2022-12-23 | End: 2022-12-28

## 2022-12-22 RX ORDER — VANCOMYCIN HCL 1 G
1000 VIAL (EA) INTRAVENOUS EVERY 12 HOURS
Refills: 0 | Status: DISCONTINUED | OUTPATIENT
Start: 2022-12-22 | End: 2022-12-24

## 2022-12-22 RX ORDER — ASPIRIN/CALCIUM CARB/MAGNESIUM 324 MG
1 TABLET ORAL
Qty: 30 | Refills: 0
Start: 2022-12-22 | End: 2023-01-20

## 2022-12-22 RX ORDER — SODIUM,POTASSIUM PHOSPHATES 278-250MG
1 POWDER IN PACKET (EA) ORAL ONCE
Refills: 0 | Status: COMPLETED | OUTPATIENT
Start: 2022-12-22 | End: 2022-12-22

## 2022-12-22 RX ORDER — INSULIN LISPRO 100/ML
6 VIAL (ML) SUBCUTANEOUS
Refills: 0 | Status: DISCONTINUED | OUTPATIENT
Start: 2022-12-22 | End: 2022-12-23

## 2022-12-22 RX ORDER — CLOPIDOGREL BISULFATE 75 MG/1
1 TABLET, FILM COATED ORAL
Qty: 30 | Refills: 0
Start: 2022-12-22 | End: 2023-01-20

## 2022-12-22 RX ORDER — VANCOMYCIN HCL 1 G
1250 VIAL (EA) INTRAVENOUS ONCE
Refills: 0 | Status: DISCONTINUED | OUTPATIENT
Start: 2022-12-22 | End: 2022-12-22

## 2022-12-22 RX ORDER — SODIUM CHLORIDE 9 MG/ML
1000 INJECTION INTRAMUSCULAR; INTRAVENOUS; SUBCUTANEOUS ONCE
Refills: 0 | Status: DISCONTINUED | OUTPATIENT
Start: 2022-12-22 | End: 2022-12-22

## 2022-12-22 RX ORDER — AMLODIPINE BESYLATE 2.5 MG/1
10 TABLET ORAL ONCE
Refills: 0 | Status: COMPLETED | OUTPATIENT
Start: 2022-12-22 | End: 2022-12-22

## 2022-12-22 RX ORDER — INSULIN GLARGINE 100 [IU]/ML
18 INJECTION, SOLUTION SUBCUTANEOUS AT BEDTIME
Refills: 0 | Status: DISCONTINUED | OUTPATIENT
Start: 2022-12-22 | End: 2022-12-23

## 2022-12-22 RX ORDER — INSULIN LISPRO 100/ML
VIAL (ML) SUBCUTANEOUS
Refills: 0 | Status: DISCONTINUED | OUTPATIENT
Start: 2022-12-22 | End: 2022-12-28

## 2022-12-22 RX ORDER — PIPERACILLIN AND TAZOBACTAM 4; .5 G/20ML; G/20ML
3.38 INJECTION, POWDER, LYOPHILIZED, FOR SOLUTION INTRAVENOUS EVERY 8 HOURS
Refills: 0 | Status: DISCONTINUED | OUTPATIENT
Start: 2022-12-23 | End: 2022-12-23

## 2022-12-22 RX ORDER — INSULIN LISPRO 100/ML
VIAL (ML) SUBCUTANEOUS AT BEDTIME
Refills: 0 | Status: DISCONTINUED | OUTPATIENT
Start: 2022-12-22 | End: 2022-12-28

## 2022-12-22 RX ORDER — PIPERACILLIN AND TAZOBACTAM 4; .5 G/20ML; G/20ML
3.38 INJECTION, POWDER, LYOPHILIZED, FOR SOLUTION INTRAVENOUS ONCE
Refills: 0 | Status: COMPLETED | OUTPATIENT
Start: 2022-12-23 | End: 2022-12-23

## 2022-12-22 RX ORDER — POTASSIUM CHLORIDE 20 MEQ
40 PACKET (EA) ORAL ONCE
Refills: 0 | Status: COMPLETED | OUTPATIENT
Start: 2022-12-22 | End: 2022-12-22

## 2022-12-22 RX ADMIN — Medication 1 PACKET(S): at 16:41

## 2022-12-22 RX ADMIN — Medication 40 MILLIEQUIVALENT(S): at 10:29

## 2022-12-22 RX ADMIN — Medication 100 MILLIGRAM(S): at 10:29

## 2022-12-22 RX ADMIN — ATORVASTATIN CALCIUM 80 MILLIGRAM(S): 80 TABLET, FILM COATED ORAL at 21:21

## 2022-12-22 RX ADMIN — FINASTERIDE 5 MILLIGRAM(S): 5 TABLET, FILM COATED ORAL at 11:19

## 2022-12-22 RX ADMIN — Medication 650 MILLIGRAM(S): at 20:27

## 2022-12-22 RX ADMIN — Medication 40 MILLIEQUIVALENT(S): at 16:41

## 2022-12-22 RX ADMIN — Medication 4: at 08:19

## 2022-12-22 RX ADMIN — Medication 40 MILLIEQUIVALENT(S): at 08:06

## 2022-12-22 RX ADMIN — Medication 250 MILLIGRAM(S): at 14:33

## 2022-12-22 RX ADMIN — Medication 650 MILLIGRAM(S): at 21:10

## 2022-12-22 RX ADMIN — Medication 6 UNIT(S): at 16:51

## 2022-12-22 RX ADMIN — CHLORHEXIDINE GLUCONATE 1 APPLICATION(S): 213 SOLUTION TOPICAL at 05:39

## 2022-12-22 RX ADMIN — CLOPIDOGREL BISULFATE 75 MILLIGRAM(S): 75 TABLET, FILM COATED ORAL at 11:18

## 2022-12-22 RX ADMIN — Medication 650 MILLIGRAM(S): at 12:57

## 2022-12-22 RX ADMIN — PIPERACILLIN AND TAZOBACTAM 200 GRAM(S): 4; .5 INJECTION, POWDER, LYOPHILIZED, FOR SOLUTION INTRAVENOUS at 14:18

## 2022-12-22 RX ADMIN — Medication 100 MILLIGRAM(S): at 02:40

## 2022-12-22 RX ADMIN — Medication 650 MILLIGRAM(S): at 03:11

## 2022-12-22 RX ADMIN — TAMSULOSIN HYDROCHLORIDE 0.8 MILLIGRAM(S): 0.4 CAPSULE ORAL at 21:21

## 2022-12-22 RX ADMIN — PIPERACILLIN AND TAZOBACTAM 25 GRAM(S): 4; .5 INJECTION, POWDER, LYOPHILIZED, FOR SOLUTION INTRAVENOUS at 16:41

## 2022-12-22 RX ADMIN — AMLODIPINE BESYLATE 10 MILLIGRAM(S): 2.5 TABLET ORAL at 02:49

## 2022-12-22 RX ADMIN — Medication 81 MILLIGRAM(S): at 11:18

## 2022-12-22 RX ADMIN — SODIUM CHLORIDE 250 MILLILITER(S): 9 INJECTION, SOLUTION INTRAVENOUS at 17:49

## 2022-12-22 RX ADMIN — INSULIN GLARGINE 18 UNIT(S): 100 INJECTION, SOLUTION SUBCUTANEOUS at 22:00

## 2022-12-22 RX ADMIN — Medication 650 MILLIGRAM(S): at 02:40

## 2022-12-22 RX ADMIN — Medication 1 PACKET(S): at 08:05

## 2022-12-22 RX ADMIN — Medication 3: at 16:52

## 2022-12-22 NOTE — DISCHARGE NOTE NURSING/CASE MANAGEMENT/SOCIAL WORK - PATIENT PORTAL LINK FT
You can access the FollowMyHealth Patient Portal offered by [facility] by registering at the following website: [email or web address]. By joining [employer]’s FollowMyHealth portal, you will also be able to view your health information using other applications (apps) compatible with our system.

## 2022-12-22 NOTE — DIETITIAN INITIAL EVALUATION ADULT - REASON INDICATOR FOR ASSESSMENT
Nutrition consult warranted for: nutrition services assessment and education   Information obtained from: electronic medical record and patient  Chart reviewed, events noted.

## 2022-12-22 NOTE — PROVIDER CONTACT NOTE (OTHER) - ACTION/TREATMENT ORDERED:
Dr. delacruz made aware and evaluated patient at bedside. septic work up initiated and was sent to labs. Patient remains stable not in acute distress. mentating well.

## 2022-12-22 NOTE — DIETITIAN INITIAL EVALUATION ADULT - PERTINENT MEDS FT
MEDICATIONS  (STANDING):  aspirin enteric coated 81 milliGRAM(s) Oral daily  atorvastatin 80 milliGRAM(s) Oral at bedtime  chlorhexidine 2% Cloths 1 Application(s) Topical <User Schedule>  clopidogrel Tablet 75 milliGRAM(s) Oral daily  dextrose 50% Injectable 25 Gram(s) IV Push once  dextrose 50% Injectable 12.5 Gram(s) IV Push once  dextrose 50% Injectable 25 Gram(s) IV Push once  dextrose Oral Gel 15 Gram(s) Oral once  finasteride 5 milliGRAM(s) Oral daily  glucagon  Injectable 1 milliGRAM(s) IntraMuscular once  influenza   Vaccine 0.5 milliLiter(s) IntraMuscular once  insulin lispro (ADMELOG) corrective regimen sliding scale   SubCutaneous three times a day before meals  insulin lispro (ADMELOG) corrective regimen sliding scale   SubCutaneous at bedtime  lactated ringers 1000 milliLiter(s) (250 mL/Hr) IV Continuous <Continuous>  piperacillin/tazobactam IVPB.- 3.375 Gram(s) IV Intermittent once  potassium chloride    Tablet ER 40 milliEquivalent(s) Oral once  potassium chloride   Solution 40 milliEquivalent(s) Oral once  potassium phosphate / sodium phosphate Powder (PHOS-NaK) 1 Packet(s) Oral once  tamsulosin 0.8 milliGRAM(s) Oral at bedtime  vancomycin  IVPB 1000 milliGRAM(s) IV Intermittent every 12 hours    MEDICATIONS  (PRN):  acetaminophen     Tablet .. 650 milliGRAM(s) Oral every 6 hours PRN Temp greater or equal to 38C (100.4F), Mild Pain (1 - 3)  aluminum hydroxide/magnesium hydroxide/simethicone Suspension 30 milliLiter(s) Oral every 4 hours PRN Dyspepsia  atropine Injectable 1 milliGRAM(s) IV Push once PRN symptomatic bradycardia  melatonin 3 milliGRAM(s) Oral at bedtime PRN Insomnia  ondansetron Injectable 4 milliGRAM(s) IV Push every 8 hours PRN Nausea and/or Vomiting

## 2022-12-22 NOTE — PROGRESS NOTE ADULT - PROBLEM SELECTOR PLAN 1
-EKG showed 3:1, Mobitz type II AV block. Pt was given Atropine x3, with HRs remaining in the 40s.  -no prior history   -TSH wnl  -TTE  -cardiac MRI  -ep following, appreciate recs  -CT chest noncon: mediastinal LAD  -ICD placement on 12/21 -EKG showed 3:1, Mobitz type II AV block. Pt was given Atropine x3, with HRs remaining in the 40s.  -no prior history   -TSH wnl  -TTE  -cardiac MRI  -ep following, appreciate recs  -CT chest noncon: mediastinal LAD  -ICD placement on 12/21 done - febrile to 103, new clinical finding  - full set of labs ordered and vbg w/ lactate  - UA urine culture blood culture  - will initiate ABX after cultures drawn (spiked through cefazolin), will do vanc  - MRSA swab

## 2022-12-22 NOTE — DISCHARGE NOTE PROVIDER - NSDCMRMEDTOKEN_GEN_ALL_CORE_FT
Adult Aspirin Regimen 81 mg oral delayed release tablet: 1 tab(s) orally once a day  alfuzosin 10 mg oral tablet, extended release: 1 tab(s) orally once a day  amLODIPine 10 mg oral tablet: 1 tab(s) orally once a day  atorvastatin 80 mg oral tablet: 1 tab(s) orally once a day  cephalexin 250 mg oral capsule: 1 cap(s) orally every 6 hours  clopidogrel 75 mg oral tablet: 1 tab(s) orally once a day  finasteride 5 mg oral tablet: 1 tab(s) orally once a day  glipiZIDE 5 mg oral tablet: 1 tab(s) orally once a day  losartan 50 mg oral tablet: 1 tab(s) orally once a day  metFORMIN 1000 mg oral tablet: 1 tab(s) orally 2 times a day   Adult Aspirin Regimen 81 mg oral delayed release tablet: 1 tab(s) orally once a day  alfuzosin 10 mg oral tablet, extended release: 1 tab(s) orally once a day  amLODIPine 10 mg oral tablet: 1 tab(s) orally once a day  atorvastatin 80 mg oral tablet: 1 tab(s) orally once a day  clopidogrel 75 mg oral tablet: 1 tab(s) orally once a day  finasteride 5 mg oral tablet: 1 tab(s) orally once a day  glipiZIDE 5 mg oral tablet: 1 tab(s) orally once a day  losartan 50 mg oral tablet: 1 tab(s) orally once a day  metFORMIN 1000 mg oral tablet: 1 tab(s) orally 2 times a day  Steglatro 15 mg oral tablet: 1 tab(s) orally once a day (in the morning)   Adult Aspirin Regimen 81 mg oral delayed release tablet: 1 tab(s) orally once a day  alfuzosin 10 mg oral tablet, extended release: 1 tab(s) orally once a day  amLODIPine 10 mg oral tablet: 1 tab(s) orally once a day  atorvastatin 80 mg oral tablet: 1 tab(s) orally once a day  Calcium 500+D oral tablet, chewable: 1 tab(s) orally once a day   clopidogrel 75 mg oral tablet: 1 tab(s) orally once a day  finasteride 5 mg oral tablet: 1 tab(s) orally once a day  glipiZIDE 5 mg oral tablet: 1 tab(s) orally once a day  losartan 50 mg oral tablet: 1 tab(s) orally once a day  metFORMIN 1000 mg oral tablet: 1 tab(s) orally 2 times a day   Adult Aspirin Regimen 81 mg oral delayed release tablet: 1 tab(s) orally once a day  alfuzosin 10 mg oral tablet, extended release: 1 tab(s) orally once a day  amLODIPine 10 mg oral tablet: 1 tab(s) orally once a day  atorvastatin 80 mg oral tablet: 1 tab(s) orally once a day  Calcium 500+D oral tablet, chewable: 1 tab(s) orally once a day   clopidogrel 75 mg oral tablet: 1 tab(s) orally once a day  finasteride 5 mg oral tablet: 1 tab(s) orally once a day  losartan 50 mg oral tablet: 1 tab(s) orally once a day  metFORMIN 1000 mg oral tablet: 1 tab(s) orally 2 times a day   Adult Aspirin Regimen 81 mg oral delayed release tablet: 1 tab(s) orally once a day  alfuzosin 10 mg oral tablet, extended release: 1 tab(s) orally once a day  amLODIPine 10 mg oral tablet: 1 tab(s) orally once a day  atorvastatin 80 mg oral tablet: 1 tab(s) orally once a day  Calcium 500+D oral tablet, chewable: 1 tab(s) orally once a day   clopidogrel 75 mg oral tablet: 1 tab(s) orally once a day  ertugliflozin 15 mg oral tablet: 1 tab(s) orally once a day (in the morning)  finasteride 5 mg oral tablet: 1 tab(s) orally once a day  losartan 50 mg oral tablet: 1 tab(s) orally once a day  metFORMIN 1000 mg oral tablet: 1 tab(s) orally 2 times a day

## 2022-12-22 NOTE — PROGRESS NOTE ADULT - PROBLEM SELECTOR PLAN 3
-c/w atorvastatin  -CT angio head:  CTA BRAIN: Complete occlusion of the right distal V3 vertebral artery segment and multifocal occlusions with segmental opacification of the right intracranial V4 segment extending to the basilar confluence, the   chronicity of which is unknown. Further workup with MRA may be helpful in the evaluation for dissection. Multifocal severe narrowing of the left V4 segment.    Atherosclerotic changes with moderate stenosis of the right carotid siphon and mild stenosis of the left carotid siphon.    CTA NECK: Severe atherosclerotic narrowing at the origin of the left vertebral artery and mild to moderate narrowing involving both vertebral artery V2 segments. Noncalcified plaque with less than 50% stenosis at   the right proximal cervical ICA by NASCET criteria. -c/w atorvastatin  -CT angio head:  CTA BRAIN: Complete occlusion of the right distal V3 vertebral artery segment and multifocal occlusions with segmental opacification of the right intracranial V4 segment extending to the basilar confluence, the   chronicity of which is unknown. Further workup with MRA may be helpful in the evaluation for dissection. Multifocal severe narrowing of the left V4 segment.    Atherosclerotic changes with moderate stenosis of the right carotid siphon and mild stenosis of the left carotid siphon.    CTA NECK: Severe atherosclerotic narrowing at the origin of the left vertebral artery and mild to moderate narrowing involving both vertebral artery V2 segments. Noncalcified plaque with less than 50% stenosis at   the right proximal cervical ICA by NASCET criteria.    Plan: will dc pt on reduced dose of aspirin 81 and plavix 75 -LAD seen on CT noncontrast   -pulm c/s, possible biopsy through ebus?  -rheum c/s: outpt f/u w/ biopsy result and lab work

## 2022-12-22 NOTE — PROGRESS NOTE ADULT - SUBJECTIVE AND OBJECTIVE BOX
Interventional Radiology Follow-Up Note.    This is a 57y Male s/p left supraclavicular lymph node on 12/21 in Interventional Radiology.     Patient seen and examined @ bedside. No complaint offered.    Medication:   amLODIPine   Tablet: (12-20)  amLODIPine   Tablet: (12-22)  aspirin enteric coated: (12-20)  ceFAZolin   IVPB: (12-22)    Vitals:   T(F): 98, Max: 99.6 (02:52)  HR: 102  BP: 167/89  RR: 18  SpO2: 98%    Physical Exam:  General: Nontoxic, in NAD.  Biopsy site: soft, mild ttp, dressing c/d/i.      Aspartate Aminotransferase (AST/SGOT): 19 U/L (12-22-22 @ 06:19)  Alanine Aminotransferase (ALT/SGPT): 15 U/L (12-22-22 @ 06:19)  Aspartate Aminotransferase (AST/SGOT): 15 U/L (12-21-22 @ 06:57)  Alanine Aminotransferase (ALT/SGPT): 17 U/L (12-21-22 @ 06:57)        LABS:  Na: 139 (12-22 @ 06:19), 145 (12-21 @ 06:57), 142 (12-20 @ 08:16)  K: 3.2 (12-22 @ 06:19), 3.3 (12-21 @ 06:57), 3.2 (12-20 @ 08:16)  Cl: 104 (12-22 @ 06:19), 109 (12-21 @ 06:57), 107 (12-20 @ 08:16)  CO2: 22 (12-22 @ 06:19), 23 (12-21 @ 06:57), 22 (12-20 @ 08:16)  BUN: 23 (12-22 @ 06:19), 31 (12-21 @ 06:57), 29 (12-20 @ 08:16)  Cr: 1.25 (12-22 @ 06:19), 1.50 (12-21 @ 06:57), 1.36 (12-20 @ 08:16)  Glu: 318(12-22 @ 06:19), 201(12-21 @ 06:57), 261(12-20 @ 08:16)    Hgb: 13.4 (12-22 @ 06:18), 12.8 (12-21 @ 06:58), 12.7 (12-20 @ 08:16)  Hct: 41.6 (12-22 @ 06:18), 40.4 (12-21 @ 06:58), 39.4 (12-20 @ 08:16)  WBC: 9.63 (12-22 @ 06:18), 7.59 (12-21 @ 06:58), 9.37 (12-20 @ 08:16)  Plt: 149 (12-22 @ 06:18), 129 (12-21 @ 06:58), 153 (12-20 @ 08:16)    INR: 1.11 12-21-22 @ 06:58  PTT: 28.4 12-21-22 @ 06:58        LIVER FUNCTIONS - ( 22 Dec 2022 06:19 )  Alb: 3.9 g/dL / Pro: 6.6 g/dL / ALK PHOS: 62 U/L / ALT: 15 U/L / AST: 19 U/L / GGT: x                Assessment/Plan:  57y Male admitted with Dizziness and giddiness s/p left supraclavicular lymph node on 12/21 in Interventional Radiology.      - f/u biopsy results.   - IR will sign off.   -Global care per primary team.     Please call IR at  [phone] with any questions, concerns, or issues regarding above.    Also available on Teams.

## 2022-12-22 NOTE — DISCHARGE NOTE PROVIDER - NSDCCPCAREPLAN_GEN_ALL_CORE_FT
PRINCIPAL DISCHARGE DIAGNOSIS  Diagnosis: Dizziness  Assessment and Plan of Treatment: You had dizziness because of your low heart rate. We consulted cardiology and gave you an ICD device. We found that there were lymph nodes on your CT and performed a biopsy. You need to follow up with rheumatology and cardiology for your ICD placement and the results of your biopsy and the management for your potential sarcoidosis. We reduced your aspirin to 81 and started you on plavix as well. You need to finish the keflex for 1d after ICD placement.       PRINCIPAL DISCHARGE DIAGNOSIS  Diagnosis: Dizziness  Assessment and Plan of Treatment: You had dizziness because of your low heart rate. We consulted cardiology and gave you an ICD device. We found that there were lymph nodes on your CT and performed a biopsy. The biopsy was inconclusive, necessitating another biopsy.  We did an ultrasound of your thyroid and found a nodule that required further evaluation.  Because you started experiencing fevers after the ICD placement, you received IV antibiotics. Other medication changes we made include reducing your aspirin to 81 and starting plavix as well.   You need to follow up with endocrinology, rheumatology, and cardiology for your ICD placement       PRINCIPAL DISCHARGE DIAGNOSIS  Diagnosis: Dizziness  Assessment and Plan of Treatment: You had dizziness because of your low heart rate. We consulted cardiology and an ICD device was placed on 12/21. A biopsy was also performed on one of your lymph nodes as it looked irregular on imaging. The biopsy was inconclusive, necessitating another biopsy during your admission. An ultrasound of your thyroid and found a nodule that required further evaluation. _________  Because you started experiencing fevers after the ICD placement, you received IV antibiotics. Other medication changes we made include reducing your aspirin to 81 and starting plavix as well.   You need to follow up with endocrinology, rheumatology, and cardiology for your ICD placement       PRINCIPAL DISCHARGE DIAGNOSIS  Diagnosis: Dizziness  Assessment and Plan of Treatment: You had dizziness because of your low heart rate. We consulted cardiology and an ICD device was placed on 12/21. A biopsy was also performed on one of your lymph nodes as it looked irregular on imaging. The biopsy was inconclusive, necessitating another biopsy during your admission. An ultrasound of your thyroid and found a nodule that required further evaluation.   Because you started experiencing fevers after the ICD placement, you received IV antibiotics. Other medication changes we made include reducing your aspirin to 81 and starting plavix as well.   You need to follow up with endocrinology and your PCP for your thyroid nodule and bladder nodule and if you want to pursue further workup and imaging, and cardiology for your ICD placement. An appointment has already been made for you, please go to the [facility] on 1/6/22 @ 2:40pm to f/u with Dr. Blair. You also have a follow up appointment with rheumatology to rule out sarcoidosis an autoimmune process that may be contributing to your fevers.       PRINCIPAL DISCHARGE DIAGNOSIS  Diagnosis: Dizziness  Assessment and Plan of Treatment: You had dizziness because of your low heart rate. We consulted cardiology and an ICD device was placed on 12/21. A biopsy was also performed on one of your lymph nodes as it looked irregular on imaging. The biopsy was inconclusive, necessitating another biopsy during your admission. An ultrasound of your thyroid and found a nodule that required further evaluation.   Because you started experiencing fevers after the ICD placement, you received IV antibiotics. Other medication changes we made include reducing your aspirin to 81 and starting plavix as well.   You need to follow up with endocrinology and your PCP for your thyroid nodule and bladder nodule and if you want to pursue further workup and imaging, and cardiology for your ICD placement. An appointment has already been made for you, please go to the [facility] on 1/6/22 @ 2:40pm to f/u with Dr. Blair. You also have a follow up appointment with rheumatology to rule out sarcoidosis an autoimmune process that may be contributing to your fevers.Please also attend your PET scan scheduled at 10:15 AM on 1/4/2022 at [facility].

## 2022-12-22 NOTE — DISCHARGE NOTE PROVIDER - PROVIDER TOKENS
PROVIDER:[TOKEN:[[id number]],FOLLOWUP:[1 week]],PROVIDER:[TOKEN:[[id number]],FOLLOWUP:[2 weeks]]

## 2022-12-22 NOTE — PROGRESS NOTE ADULT - ATTENDING COMMENTS
57M with a PMHx of CAD, CVA (2012 with no significant residual weakness), DM, p/w bradycardia. EKG concerning for 3:1, Mobitz type II AV block. CT chest: Mediastinal and supraclavicular lymph nodes. Pt pending ICD placement and further workup.    Appears tired, able to speak in full sentences. Site of ICD healing, no erythema or drainage noted. Mildly tender.     Plan:   -IR-guided biopsy of supraclavicular LN, f/u with final read   -Febrile today: tylenol, initiated infectious work-up: CBC, CMP, lactate, VBG, Ua/Ucx, Blcx x2, MRSA swab, inflammatory markers. Will start broad spectrum Abx   -Rheumatology consulted   -Cards/EP followings, rec appreciated. 57M with a PMHx of CAD, CVA (2012 with no significant residual weakness), DM, p/w bradycardia. EKG concerning for 3:1, Mobitz type II AV block. CT chest: Mediastinal and supraclavicular lymph nodes. Pt pending ICD placement and further workup.    Appears tired, able to speak in full sentences. Site of ICD healing, no erythema or drainage noted. Mildly tender.     Spoke to PCP, Dr. Chris Mason: informed patient required ICD, admitted for bradycardia. Will need outpatient f/u with biopsy results in addition to malignancy work-up with colonoscopy, PET/CT.     Plan:   -IR-guided biopsy of supraclavicular LN, f/u with final read   -Febrile today: tylenol, initiated infectious work-up: CBC, CMP, lactate, VBG, Ua/Ucx, Blcx x2, MRSA swab, inflammatory markers. Will start broad spectrum Abx. Continue to monitor vitals and fever curve.   -Rheumatology consulted   -Cards/EP followings, rec appreciated.

## 2022-12-22 NOTE — DISCHARGE NOTE PROVIDER - NSDCFUSCHEDAPPT_GEN_ALL_CORE_FT
[facility]  [address]  Scheduled Appointment: 12/27/2022     [facility]  [facility]  [place]  Scheduled Appointment: 01/04/2023    [facility]  [facility]  [place]  Scheduled Appointment: 01/04/2023    [facility]  [facility]  [place]  Scheduled Appointment: 01/04/2023    [facility]  [facility]  [facility]  Scheduled Appointment: 01/04/2023    [facility]  [facility] [address]  Scheduled Appointment: 01/17/2023

## 2022-12-22 NOTE — PROVIDER CONTACT NOTE (OTHER) - ASSESSMENT
Pt alert & oriented x 4. on RA. Denies chest pain, sob, palpitations. patient running a temp of 103F rectally.

## 2022-12-22 NOTE — DIETITIAN INITIAL EVALUATION ADULT - ORAL INTAKE PTA/DIET HISTORY
Pt endorses fair appetite and PO intake PTA. Pt reports consuming 2 meals a day, does not follow any therapeutic diet restrictions at home. States he tries to limit concentrated sweets. Confirms no known food allergies or intolerances. Pt denies hx of chewing or swallowing difficulties. Denies use of oral nutritional supplements. Confirms use of fish oil, Vitamin D and C. Pt with hx of DM, reports use of Metformin and Glipizide, checks finger sticks, does not recall ranges. HbA1c 6.3% (12/20/22), suggests good glycemic control.

## 2022-12-22 NOTE — DIETITIAN INITIAL EVALUATION ADULT - PERTINENT LABORATORY DATA
12-22    140  |  104  |  24<H>  ----------------------------<  326<H>  3.4<L>   |  23  |  1.35<H>    Ca    9.0      22 Dec 2022 13:34  Phos  2.4     12-22  Mg     2.0     12-22    TPro  6.6  /  Alb  4.1  /  TBili  0.6  /  DBili  x   /  AST  15  /  ALT  14  /  AlkPhos  61  12-22  POCT Blood Glucose.: 318 mg/dL (12-22-22 @ 12:12)  A1C with Estimated Average Glucose Result: 6.3 % (12-20-22 @ 08:16)

## 2022-12-22 NOTE — DISCHARGE NOTE PROVIDER - NSDCCPTREATMENT_GEN_ALL_CORE_FT
PRINCIPAL PROCEDURE  Procedure: CT chest wo con  Findings and Treatment: PROCEDURE:  CT scan of the chest was obtained without intravenous contrast.  FINDINGS:  LYMPH NODES: Mediastinal and supraclavicular lymph nodes, for reference   there is a upper tracheal lymph node with calcification that measures 3.1   x 2.9 cm (series 2 image 37), a left supraclavicular lymph node that   measures 4.1 x 2.9 cm (series 5 image 39) and a subcarinal lymph node   that measures 2.3 x 1.9 cm (series 5 image 54).  HEART/VASCULATURE: Heart size is within normal limits. No pericardial   effusion. Normal caliber aorta. Calcifications of the coronary arteries   and aorta.  AIRWAYS/LUNGS/PLEURA: The central airways are patent. There are   peripheral curvilinear opacities in the bilateral lower lobes and upper   lobes. The remainder of the lungs are clear. No pleural effusion or   pneumothorax.  UPPER ABDOMEN: Left kidney cyst.  BONES/SOFT TISSUES: Degenerative changes of the spine. No aggressive   osseous lesion.  IMPRESSION:  1.  Mediastinal and supraclavicular lymph nodes, as described above.

## 2022-12-22 NOTE — PROGRESS NOTE ADULT - PROBLEM SELECTOR PLAN 5
ISS  monitor blood glucose  FS at mealtimes/bedtime - restarted amlodipine  - will resume pt's losartan when dc'd

## 2022-12-22 NOTE — DIETITIAN INITIAL EVALUATION ADULT - ENERGY INTAKE
Pt reports fair appetite and PO intake since admission, is currently ordered for a halal, DASH/TLC diet, tolerating well. Pt reports he is consuming <50% of meals. Pt is not amenable to oral nutrition supplements at this time. Pt states he does not the taste of them, reports they are too sweet. Pt made aware of menu ordering procedures in house, pt has no food preferences at this time.     - Nutrition-related concerns:   - K and Phos noted to be low. Pt ordered for lactated ringers with Potassium Chloride.   - Pt ordered for Vancomycin IVPB.     Endo: BG being managed with Admelog and SSI. Finger sticks today 318, 303 mg/dl.

## 2022-12-22 NOTE — DIETITIAN INITIAL EVALUATION ADULT - REASON
Nutrition Focused Physical Exam deferred at this time. Pt appears well developed with no overt signs of muscle or fat depletion.

## 2022-12-22 NOTE — DISCHARGE NOTE NURSING/CASE MANAGEMENT/SOCIAL WORK - NSDCFUADDAPPT_GEN_ALL_CORE_FT
APPTS ARE READY TO BE MADE: [x ] YES    Best Family or Patient Contact (if needed):    Additional Information about above appointments (if needed):    1: Dr. Whitehead -- cardiology  2: Dr. Cradoza -- rheumatology  3:     Other comments or requests:

## 2022-12-22 NOTE — PROGRESS NOTE ADULT - SUBJECTIVE AND OBJECTIVE BOX
24H hour events:     MEDICATIONS:  aspirin enteric coated 81 milliGRAM(s) Oral daily  clopidogrel Tablet 75 milliGRAM(s) Oral daily    cephalexin 250 milliGRAM(s) Oral every 6 hours      acetaminophen     Tablet .. 650 milliGRAM(s) Oral every 6 hours PRN  melatonin 3 milliGRAM(s) Oral at bedtime PRN  ondansetron Injectable 4 milliGRAM(s) IV Push every 8 hours PRN    aluminum hydroxide/magnesium hydroxide/simethicone Suspension 30 milliLiter(s) Oral every 4 hours PRN  atropine Injectable 1 milliGRAM(s) IV Push once PRN    atorvastatin 80 milliGRAM(s) Oral at bedtime  dextrose 50% Injectable 25 Gram(s) IV Push once  dextrose 50% Injectable 12.5 Gram(s) IV Push once  dextrose 50% Injectable 25 Gram(s) IV Push once  dextrose Oral Gel 15 Gram(s) Oral once  finasteride 5 milliGRAM(s) Oral daily  glucagon  Injectable 1 milliGRAM(s) IntraMuscular once  insulin lispro (ADMELOG) corrective regimen sliding scale   SubCutaneous three times a day before meals  insulin lispro (ADMELOG) corrective regimen sliding scale   SubCutaneous at bedtime    chlorhexidine 2% Cloths 1 Application(s) Topical <User Schedule>  influenza   Vaccine 0.5 milliLiter(s) IntraMuscular once  potassium chloride   Solution 40 milliEquivalent(s) Oral once  tamsulosin 0.8 milliGRAM(s) Oral at bedtime      REVIEW OF SYSTEMS:  Complete 12point ROS negative.    PHYSICAL EXAM:  T(C): 36.7 (12-22-22 @ 04:17), Max: 37.6 (12-22-22 @ 02:52)  HR: 102 (12-22-22 @ 04:17) (51 - 106)  BP: 167/89 (12-22-22 @ 04:17) (140/66 - 186/94)  RR: 18 (12-22-22 @ 04:17) (14 - 20)  SpO2: 98% (12-22-22 @ 04:17) (94% - 99%)  Wt(kg): --  I&O's Summary      Appearance: Normal	  HEENT:   Normal oral mucosa, PERRL, EOMI	  Lymphatic: No lymphadenopathy  Cardiovascular: Normal S1 S2, No JVD, No murmurs, No edema  Respiratory: Lungs clear to auscultation	  Psychiatry: A & O x 3, Mood & affect appropriate  Gastrointestinal:  Soft, Non-tender, + BS	  Skin: No rashes, No ecchymoses, No cyanosis	  Neurologic: Non-focal  Extremities: Normal range of motion, No clubbing, cyanosis or edema  Vascular: Peripheral pulses palpable 2+ bilaterally        LABS:	 	    CBC Full  -  ( 22 Dec 2022 06:18 )  WBC Count : 9.63 K/uL  Hemoglobin : 13.4 g/dL  Hematocrit : 41.6 %  Platelet Count - Automated : 149 K/uL  Mean Cell Volume : 79.4 fl  Mean Cell Hemoglobin : 25.6 pg  Mean Cell Hemoglobin Concentration : 32.2 gm/dL  Auto Neutrophil # : 8.13 K/uL  Auto Lymphocyte # : 0.61 K/uL  Auto Monocyte # : 0.80 K/uL  Auto Eosinophil # : 0.03 K/uL  Auto Basophil # : 0.02 K/uL  Auto Neutrophil % : 84.5 %  Auto Lymphocyte % : 6.3 %  Auto Monocyte % : 8.3 %  Auto Eosinophil % : 0.3 %  Auto Basophil % : 0.2 %    12-22    139  |  104  |  23  ----------------------------<  318<H>  3.2<L>   |  22  |  1.25  12-21    145  |  109<H>  |  31<H>  ----------------------------<  201<H>  3.3<L>   |  23  |  1.50<H>    Ca    9.0      22 Dec 2022 06:19  Ca    8.9      21 Dec 2022 06:57  Phos  2.2     12-22  Phos  2.7     12-21  Mg     1.9     12-22  Mg     2.0     12-21    TPro  6.6  /  Alb  3.9  /  TBili  0.5  /  DBili  x   /  AST  19  /  ALT  15  /  AlkPhos  62  12-22  TPro  6.4  /  Alb  3.6  /  TBili  0.2  /  DBili  x   /  AST  15  /  ALT  17  /  AlkPhos  55  12-21      proBNP:   Lipid Profile:   HgA1c:   TSH:       CARDIAC MARKERS:          TELEMETRY: 	    ECG:  	  RADIOLOGY:  OTHER: 	    PREVIOUS DIAGNOSTIC TESTING:    [ ] Echocardiogram:    [ ]  Catheterization:  [ ] Stress Test:  	  	  ASSESSMENT/PLAN: 	     24H hour events:   Resting comfortably in bed; easily arousable with namecalling; no c/o presented at this time; tele overnight Asense Vpacing 80-100s    MEDICATIONS:  aspirin enteric coated 81 milliGRAM(s) Oral daily  clopidogrel Tablet 75 milliGRAM(s) Oral daily  cephalexin 250 milliGRAM(s) Oral every 6 hours  acetaminophen     Tablet .. 650 milliGRAM(s) Oral every 6 hours PRN  melatonin 3 milliGRAM(s) Oral at bedtime PRN  ondansetron Injectable 4 milliGRAM(s) IV Push every 8 hours PRN  aluminum hydroxide/magnesium hydroxide/simethicone Suspension 30 milliLiter(s) Oral every 4 hours PRN  atropine Injectable 1 milliGRAM(s) IV Push once PRN  atorvastatin 80 milliGRAM(s) Oral at bedtime  dextrose 50% Injectable 25 Gram(s) IV Push once  dextrose 50% Injectable 12.5 Gram(s) IV Push once  dextrose 50% Injectable 25 Gram(s) IV Push once  dextrose Oral Gel 15 Gram(s) Oral once  finasteride 5 milliGRAM(s) Oral daily  glucagon  Injectable 1 milliGRAM(s) IntraMuscular once  insulin lispro (ADMELOG) corrective regimen sliding scale   SubCutaneous three times a day before meals  insulin lispro (ADMELOG) corrective regimen sliding scale   SubCutaneous at bedtime  chlorhexidine 2% Cloths 1 Application(s) Topical <User Schedule>  influenza   Vaccine 0.5 milliLiter(s) IntraMuscular once  potassium chloride   Solution 40 milliEquivalent(s) Oral once  tamsulosin 0.8 milliGRAM(s) Oral at bedtime      REVIEW OF SYSTEMS:  Complete 12point ROS negative.    PHYSICAL EXAM:  T(C): 36.7 (12-22-22 @ 04:17), Max: 37.6 (12-22-22 @ 02:52)  HR: 102 (12-22-22 @ 04:17) (51 - 106)  BP: 167/89 (12-22-22 @ 04:17) (140/66 - 186/94)  RR: 18 (12-22-22 @ 04:17) (14 - 20)  SpO2: 98% (12-22-22 @ 04:17) (94% - 99%)  Wt(kg): --  I&O's Summary      Appearance: well develop, in NAD  Head: normocephalic, atraumatic	  Neck: supple  Cardiovascular: Normal S1 S2, No JVD, no mr/g  Respiratory: Lungs clear to auscultation	  Psychiatry: A & O x 3, Mood & affect appropriate  Gastrointestinal:  Soft, Non-tender, + BS	  : voiding  Skin: No rashes, No ecchymoses, left ICD site without bleeding or hematoma  Neurologic: Non-focal  Extremities: Normal range of motion, no c/c/e  Vascular: Peripheral pulses palpable 2+ bilaterally        LABS:	 	    CBC Full  -  ( 22 Dec 2022 06:18 )  WBC Count : 9.63 K/uL  Hemoglobin : 13.4 g/dL  Hematocrit : 41.6 %  Platelet Count - Automated : 149 K/uL  Mean Cell Volume : 79.4 fl  Mean Cell Hemoglobin : 25.6 pg  Mean Cell Hemoglobin Concentration : 32.2 gm/dL  Auto Neutrophil # : 8.13 K/uL  Auto Lymphocyte # : 0.61 K/uL  Auto Monocyte # : 0.80 K/uL  Auto Eosinophil # : 0.03 K/uL  Auto Basophil # : 0.02 K/uL  Auto Neutrophil % : 84.5 %  Auto Lymphocyte % : 6.3 %  Auto Monocyte % : 8.3 %  Auto Eosinophil % : 0.3 %  Auto Basophil % : 0.2 %    12-22    139  |  104  |  23  ----------------------------<  318<H>  3.2<L>   |  22  |  1.25  12-21    145  |  109<H>  |  31<H>  ----------------------------<  201<H>  3.3<L>   |  23  |  1.50<H>    Ca    9.0      22 Dec 2022 06:19  Ca    8.9      21 Dec 2022 06:57  Phos  2.2     12-22  Phos  2.7     12-21  Mg     1.9     12-22  Mg     2.0     12-21    TPro  6.6  /  Alb  3.9  /  TBili  0.5  /  DBili  x   /  AST  19  /  ALT  15  /  AlkPhos  62  12-22  TPro  6.4  /  Alb  3.6  /  TBili  0.2  /  DBili  x   /  AST  15  /  ALT  17  /  AlkPhos  55  12-21      proBNP:   Lipid Profile:   HgA1c:   TSH:       CARDIAC MARKERS:      TELEMETRY: NSR/Vpaced 70-100s     ECG: Asense Vpaced @ 77 bpm, 	    CXR 12/22/22:    Left chest wall ICD with intact leads with tips in the expected region of   the right atrium and rightventricle again seen. Generator obscures part   of left lung.  Visualized lungs are clear.  No pleural effusion or pneumothorax is seen.      IMPRESSION:  Left chest wall ICD with intact leads with tips in the   expected region of the right atrium and right ventricle.    No pneumothorax.    The visualized lungs are clear.      Echo 12/20/22:    Dimensions:    Normal Values:  LA:     3.5    2.0 - 4.0 cm  Ao:     3.2    2.0 - 3.8 cm  SEPTUM: 1.0    0.6 - 1.2 cm  PWT:    1.0    0.6 - 1.1 cm  LVIDd:  4.5    3.0 - 5.6 cm  LVIDs:  2.9    1.8 - 4.0 cm  Derived variables:  LVMI: 85 g/m2  RWT: 0.48  Fractional short: 36 %  EF (Modified Mayers Rule): 57 %Doppler Peak Velocity  (m/sec): AoV=2.2  ------------------------------------------------------------------------  Observations:  Mitral Valve: Mitral annular calcification and calcified  mitral leaflets with normal diastolic opening. Mild mitral  regurgitation.  Aortic Valve/Aorta: Calcified trileaflet aortic valve with  normal opening. Peak transaortic valve gradient equals 20  mm Hg, mean transaortic valve gradient equals 9 mm Hg,  aortic valve velocity time integral equals 47 cm, estimated  aortic valve area equals 2.1 sqcm. No aortic valve  regurgitation seen. Peak left ventricular outflow tract  gradient equals 8 mm Hg, mean gradient is equal to 5 mm Hg,  LVOT velocity time integral equals 37 cm.  Aortic Root: 3.2 cm.  LVOT diameter: 1.7 cm.  Left Atrium: Normal left atrium.  LA volume index = 27  cc/m2.  Left Ventricle: Normal left ventricular systolic function.  No segmental wall motion abnormalities. Increased relative  wall thickness with normal left ventricular mass index,  consistent with concentric left ventricular remodeling.  Normal diastolic function.  Right Heart: Normal right atrium. Normal right ventricular  size and function. Normal tricuspid valve. Mild tricuspid  regurgitation. Normal pulmonic valve. No pulmonic  regurgitation.  Pericardium/Pleura: Normal pericardium with no pericardial  effusion.  Hemodynamic: Estimated right atrial pressure is 8 mm Hg.  Estimated right ventricular systolic pressure equals 35 mm  g, assuming right atrial pressure equals 8 mm Hg,  consistent with borderline pulmonary hypertension.  ------------------------------------------------------------------------  Conclusions:  1. Mitral annular calcification and calcified mitral  leaflets with normal diastolic opening. Mild mitral  regurgitation.  2. Calcified trileaflet aortic valve with normal opening.  No aortic valve regurgitation seen.  3. Normal left ventricular systolic function. No segmental  wall motion abnormalities.  4. Normal diastolic function.  5. Normal right ventricular size and function.   24H hour events:   Resting comfortably in bed; easily arousable with namecalling; no c/o presented at this time; tele overnight Asense Vpacing 80-100s    MEDICATIONS:  aspirin enteric coated 81 milliGRAM(s) Oral daily  clopidogrel Tablet 75 milliGRAM(s) Oral daily  cephalexin 250 milliGRAM(s) Oral every 6 hours  acetaminophen     Tablet .. 650 milliGRAM(s) Oral every 6 hours PRN  melatonin 3 milliGRAM(s) Oral at bedtime PRN  ondansetron Injectable 4 milliGRAM(s) IV Push every 8 hours PRN  aluminum hydroxide/magnesium hydroxide/simethicone Suspension 30 milliLiter(s) Oral every 4 hours PRN  atropine Injectable 1 milliGRAM(s) IV Push once PRN  atorvastatin 80 milliGRAM(s) Oral at bedtime  finasteride 5 milliGRAM(s) Oral daily  glucagon  Injectable 1 milliGRAM(s) IntraMuscular once  insulin lispro (ADMELOG) corrective regimen sliding scale   SubCutaneous three times a day before meals  insulin lispro (ADMELOG) corrective regimen sliding scale   SubCutaneous at bedtime  chlorhexidine 2% Cloths 1 Application(s) Topical <User Schedule>  influenza   Vaccine 0.5 milliLiter(s) IntraMuscular once  potassium chloride   Solution 40 milliEquivalent(s) Oral once  tamsulosin 0.8 milliGRAM(s) Oral at bedtime    REVIEW OF SYSTEMS: Complete 12point ROS negative.    PHYSICAL EXAM:  T(C): 36.7 (12-22-22 @ 04:17), Max: 37.6 (12-22-22 @ 02:52)  HR: 102 (12-22-22 @ 04:17) (51 - 106)  BP: 167/89 (12-22-22 @ 04:17) (140/66 - 186/94)  RR: 18 (12-22-22 @ 04:17) (14 - 20)  SpO2: 98% (12-22-22 @ 04:17) (94% - 99%)    Appearance: well develop, in NAD  Head: normocephalic, atraumatic	  Neck: supple  Cardiovascular: Normal S1 S2, No JVD, no mr/g  Respiratory: Lungs clear to auscultation	  Psychiatry: A & O x 3, Mood & affect appropriate  Gastrointestinal:  Soft, Non-tender, + BS	  : voiding  Skin: No rashes, No ecchymoses, left ICD site without bleeding or hematoma  Neurologic: Non-focal  Extremities: Normal range of motion, no c/c/e  Vascular: Peripheral pulses palpable 2+ bilaterally    LABS:	 	    CBC Full  -  ( 22 Dec 2022 06:18 )  WBC Count : 9.63 K/uL  Hemoglobin : 13.4 g/dL  Hematocrit : 41.6 %  Platelet Count - Automated : 149 K/uL  Mean Cell Volume : 79.4 fl  Mean Cell Hemoglobin : 25.6 pg  Mean Cell Hemoglobin Concentration : 32.2 gm/dL  Auto Neutrophil # : 8.13 K/uL  Auto Lymphocyte # : 0.61 K/uL  Auto Monocyte # : 0.80 K/uL  Auto Eosinophil # : 0.03 K/uL  Auto Basophil # : 0.02 K/uL  Auto Neutrophil % : 84.5 %  Auto Lymphocyte % : 6.3 %  Auto Monocyte % : 8.3 %  Auto Eosinophil % : 0.3 %  Auto Basophil % : 0.2 %    12-22    139  |  104  |  23  ----------------------------<  318<H>  3.2<L>   |  22  |  1.25  12-21    145  |  109<H>  |  31<H>  ----------------------------<  201<H>  3.3<L>   |  23  |  1.50<H>    Ca    9.0      22 Dec 2022 06:19  Ca    8.9      21 Dec 2022 06:57  Phos  2.2     12-22  Phos  2.7     12-21  Mg     1.9     12-22  Mg     2.0     12-21    TPro  6.6  /  Alb  3.9  /  TBili  0.5  /  DBili  x   /  AST  19  /  ALT  15  /  AlkPhos  62  12-22  TPro  6.4  /  Alb  3.6  /  TBili  0.2  /  DBili  x   /  AST  15  /  ALT  17  /  AlkPhos  55  12-21    TELEMETRY: NSR/Vpaced 70-100s     ECG: Asense Vpaced @ 77 bpm, 	    CXR 12/22/22: Left chest wall ICD with intact leads with tips in the expected region of the right atrium and right ventricle again seen. Generator obscures part of left lung. Visualized lungs are clear. No pleural effusion or pneumothorax is seen.   IMPRESSION:  Left chest wall ICD with intact leads with tips in the  expected region of the right atrium and right ventricle.  No pneumothorax.  The visualized lungs are clear.      Echo 12/20/22:    Dimensions:    Normal Values:  LA:     3.5    2.0 - 4.0 cm  Ao:     3.2    2.0 - 3.8 cm  SEPTUM: 1.0    0.6 - 1.2 cm  PWT:    1.0    0.6 - 1.1 cm  LVIDd:  4.5    3.0 - 5.6 cm  LVIDs:  2.9    1.8 - 4.0 cm  Derived variables:  LVMI: 85 g/m2  RWT: 0.48  Fractional short: 36 %  EF (Modified Mayers Rule): 57 %Doppler Peak Velocity  (m/sec): AoV=2.2  ------------------------------------------------------------------------  Observations: Mitral Valve: Mitral annular calcification and calcified mitral leaflets with normal diastolic opening. Mild mitral regurgitation. Aortic Valve/Aorta: Calcified trileaflet aortic valve with normal opening. Peak transaortic valve gradient equals 20 mm Hg, mean transaortic valve gradient equals 9 mm Hg, aortic valve velocity time integral equals 47 cm, estimated aortic valve area equals 2.1 sqcm. No aortic valve regurgitation seen. Peak left ventricular outflow tract gradient equals 8 mm Hg, mean gradient is equal to 5 mm Hg, LVOT velocity time integral equals 37 cm. Aortic Root: 3.2 cm. LVOT diameter: 1.7 cm. Left Atrium: Normal left atrium.  LA volume index = 27 cc/m2. Left Ventricle: Normal left ventricular systolic function. No segmental wall motion abnormalities. Increased relative wall thickness with normal left ventricular mass index, consistent with concentric left ventricular remodeling. Normal diastolic function. Right Heart: Normal right atrium. Normal right ventricular size and function. Normal tricuspid valve. Mild tricuspid regurgitation. Normal pulmonic valve. No pulmonic regurgitation. Pericardium/Pleura: Normal pericardium with no pericardial effusion. Hemodynamic: Estimated right atrial pressure is 8 mm Hg. Estimated right ventricular systolic pressure equals 35 mm g, assuming right atrial pressure equals 8 mm Hg, consistent with borderline pulmonary hypertension. -----------------------------------------------------------------------  Conclusions: 1. Mitral annular calcification and calcified mitral leaflets with normal diastolic opening. Mild mitral regurgitation. 2. Calcified trileaflet aortic valve with normal opening. No aortic valve regurgitation seen. 3. Normal left ventricular systolic function. No segmental wall motion abnormalities. 4. Normal diastolic function. 5. Normal right ventricular size and function.

## 2022-12-22 NOTE — DIETITIAN INITIAL EVALUATION ADULT - OTHER INFO
Weight Hx Per:  - Source: patient  - UBW: 162 pounds   - Reported weight changes: Pt denies unintentional weight loss PTA.     Weight Hx Per [facility]: n/a    Current Admission Weights:  - Dosing weight: 170 pounds (77.1 kg) (12/21)  - Daily weight: none documented thus far      **  Will continue to monitor weight trends as available/able.   IBW: 166 pounds   %IBW: 102%

## 2022-12-22 NOTE — PROGRESS NOTE ADULT - PROBLEM SELECTOR PLAN 4
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 Further workup with MRA may be helpful in the evaluation for dissection. Multifocal severe narrowing of the left V4 segment.    Atherosclerotic changes with moderate stenosis of the right carotid siphon and mild stenosis of the left carotid siphon.    CTA NECK: Severe atherosclerotic narrowing at the origin of the left vertebral artery and mild to moderate narrowing involving both vertebral artery V2 segments. Noncalcified plaque with less than 50% stenosis at   the right proximal cervical ICA by NASCET criteria.    Plan: will dc pt on reduced dose of aspirin 81 and plavix 75

## 2022-12-22 NOTE — PROGRESS NOTE ADULT - ASSESSMENT
incomplete 56 yo M PMH HTN, HLD, DM2, prior stroke in 2012 (with residual left sided numbness) transferred from OSH w/ bradycardia 2/2 Mobitz type II AV block found to have incidental enlarged lymph nodes. Rheumatology consulted to evaluate for sarcoidosis.     #New Mobitz type II AV block   #lymph node enlargement  =CT showing incidental mediastinal and supraclavicular lymph nodes (upper tracheal lymph node with calcification that measures 3.1  x 2.9 cm), left supraclavicular lymph node (4.1 x 2.9 cm), subcarinal lymph node (2.3 x 1.9 cm)  =Pt without any stigmata of autoimmune disease or sarcoid on history or exam, no hypercalcemia/transaminitis    Serology: Vit D 2,5 wnl    Plan:   -core biopsy of left supraclavicular LN already performed, if unrevealing, can consider an excisional biopsy  -will obtain vitamin D 1,25, and ACE level  -since unable to perform cardiac MRI as reported metal in arm, can consider cardiac PET scan if want to evaluate for sarcoid involvement of heart  -we will call patient to arrange his rheumatology follow up with us    Discussed with Attending Dr. Sony Santoro,   Rheumatology Fellow  Pager: [phone]  Available on TEAMS

## 2022-12-22 NOTE — PROGRESS NOTE ADULT - ASSESSMENT
Patient is a 57 y M with a PMHx of CAD, CVA (2012 with no significant residual weakness), DM, p/w bradycardia. EKG concerning for 3:1, Mobitz type II AV block. CT chest: Mediastinal and supraclavicular lymph nodes. Pt pending ICD placement and further workup.     Patient is a 57 y M with a PMHx of CAD, CVA (2012 with no significant residual weakness), DM, p/w bradycardia. EKG concerning for 3:1, Mobitz type II AV block. CT chest: Mediastinal and supraclavicular lymph nodes. ICD placed. Stable for dc w/ close outpt f/u w/ cards and rheum.     Patient is a 57 y M with a PMHx of CAD, CVA (2012 with no significant residual weakness), DM, p/w bradycardia. EKG concerning for 3:1, Mobitz type II AV block. CT chest: Mediastinal and supraclavicular lymph nodes. ICD placed. Stable for dc w/ close outpt f/u w/ cards and rheum. However pt spiked fever during hospital stay and will keep pt for sepsis workup.

## 2022-12-22 NOTE — DIETITIAN INITIAL EVALUATION ADULT - OBTAIN CURRENT WEIGHT
Debridement Text: The wound edges were debrided prior to proceeding with the closure to facilitate wound healing. yes

## 2022-12-22 NOTE — DIETITIAN INITIAL EVALUATION ADULT - REASON FOR ADMISSION
Per chart, "Patient is a 57 y M with a PMHx of CAD, CVA (2012 with no significant residual weakness), DM, p/w bradycardia. The day prior to admission, pt reports feeling dizzy, off-balance, unable to walk. He also experienced nausea and had some episodes of vomiting. He went to [facility] where he was noted to be bradycardic, was transferred here. Upon presentation, HR in the 30s. EKG concerning for 3:1, Mobitz type II AV block. Pt was given Atropine x3, with HRs remaining in the 40s. Pt denies chest pain, dyspnea, abdominal pain. Pt does not regularly follow up with a cardiologist but had seen one (for the first time), 2 months after being discharged from the ED of an OSH for abdominal pain. Per patient report, stress test was normal. "

## 2022-12-22 NOTE — PROGRESS NOTE ADULT - SUBJECTIVE AND OBJECTIVE BOX
BRANDEN MARRUFO  [id number]    INTERVAL HPI/OVERNIGHT EVENTS: Pt says he feels well. Had lymph node biopsy performed last night as well as ICD placement. Says he has mild pain and ICD placement site, otherwise feels well. Wants to go home today. Denies fever, chills, chest pain, sob, cough, rash, joint pain.    MEDICATIONS  (STANDING):  aspirin enteric coated 81 milliGRAM(s) Oral daily  atorvastatin 80 milliGRAM(s) Oral at bedtime  cephalexin 250 milliGRAM(s) Oral every 6 hours  chlorhexidine 2% Cloths 1 Application(s) Topical <User Schedule>  clopidogrel Tablet 75 milliGRAM(s) Oral daily  dextrose 50% Injectable 25 Gram(s) IV Push once  dextrose 50% Injectable 12.5 Gram(s) IV Push once  dextrose 50% Injectable 25 Gram(s) IV Push once  dextrose Oral Gel 15 Gram(s) Oral once  finasteride 5 milliGRAM(s) Oral daily  glucagon  Injectable 1 milliGRAM(s) IntraMuscular once  influenza   Vaccine 0.5 milliLiter(s) IntraMuscular once  insulin lispro (ADMELOG) corrective regimen sliding scale   SubCutaneous at bedtime  insulin lispro (ADMELOG) corrective regimen sliding scale   SubCutaneous three times a day before meals  insulin lispro (ADMELOG) corrective regimen sliding scale   SubCutaneous at bedtime  potassium chloride   Solution 40 milliEquivalent(s) Oral once  tamsulosin 0.8 milliGRAM(s) Oral at bedtime    MEDICATIONS  (PRN):  acetaminophen     Tablet .. 650 milliGRAM(s) Oral every 6 hours PRN Temp greater or equal to 38C (100.4F), Mild Pain (1 - 3)  aluminum hydroxide/magnesium hydroxide/simethicone Suspension 30 milliLiter(s) Oral every 4 hours PRN Dyspepsia  atropine Injectable 1 milliGRAM(s) IV Push once PRN symptomatic bradycardia  melatonin 3 milliGRAM(s) Oral at bedtime PRN Insomnia  ondansetron Injectable 4 milliGRAM(s) IV Push every 8 hours PRN Nausea and/or Vomiting      Allergies    No Known Allergies    Intolerances        Review of Systems: As above    Vital Signs Last 24 Hrs  T(C): 39.2 (22 Dec 2022 12:36), Max: 39.2 (22 Dec 2022 12:36)  T(F): 102.6 (22 Dec 2022 12:36), Max: 102.6 (22 Dec 2022 12:36)  HR: 106 (22 Dec 2022 12:36) (51 - 106)  BP: 162/83 (22 Dec 2022 12:36) (140/66 - 186/94)  BP(mean): 83 (21 Dec 2022 13:34) (83 - 83)  RR: 18 (22 Dec 2022 12:36) (14 - 20)  SpO2: 95% (22 Dec 2022 12:36) (94% - 99%)    Parameters below as of 22 Dec 2022 12:36  Patient On (Oxygen Delivery Method): room air      Physical Exam:  General: Breathing comfortably on room air, no distress  HEENT: EOMI, MMM, no oral ulcers  CVS: +S1/S2, RRR  Resp: CTA b/l. No crackles/wheezing  GI: Soft, NT/ND +BS  MSK: no synovitis  Skin: +surgical scar on L upper chest, small scar near L clavicle      LABS:                        13.4   9.63  )-----------( 149      ( 22 Dec 2022 06:18 )             41.6     12-22    139  |  104  |  23  ----------------------------<  318<H>  3.2<L>   |  22  |  1.25    Ca    9.0      22 Dec 2022 06:19  Phos  2.2     12-22  Mg     1.9     12-22    TPro  6.6  /  Alb  3.9  /  TBili  0.5  /  DBili  x   /  AST  19  /  ALT  15  /  AlkPhos  62  12-22    PT/INR - ( 21 Dec 2022 06:58 )   PT: 12.9 sec;   INR: 1.11 ratio         PTT - ( 21 Dec 2022 06:58 )  PTT:28.4 sec

## 2022-12-22 NOTE — PROGRESS NOTE ADULT - PROBLEM SELECTOR PLAN 6
Diet: dash diet  DVT: pending procedure  Dispo: pending clinical course Diet: dash diet  DVT: none  Dispo: home ISS  monitor blood glucose  FS at mealtimes/bedtime

## 2022-12-22 NOTE — PROGRESS NOTE ADULT - SUBJECTIVE AND OBJECTIVE BOX
PROGRESS NOTE:     Patient is a 57y old  Male who presents with a chief complaint of bradycardia (21 Dec 2022 17:54)      SUBJECTIVE / OVERNIGHT EVENTS:    ADDITIONAL REVIEW OF SYSTEMS:    MEDICATIONS  (STANDING):  aspirin enteric coated 81 milliGRAM(s) Oral daily  atorvastatin 80 milliGRAM(s) Oral at bedtime  ceFAZolin   IVPB 2000 milliGRAM(s) IV Intermittent every 8 hours  cephalexin 250 milliGRAM(s) Oral every 6 hours  chlorhexidine 2% Cloths 1 Application(s) Topical <User Schedule>  clopidogrel Tablet 75 milliGRAM(s) Oral daily  dextrose 50% Injectable 25 Gram(s) IV Push once  dextrose 50% Injectable 12.5 Gram(s) IV Push once  dextrose 50% Injectable 25 Gram(s) IV Push once  dextrose Oral Gel 15 Gram(s) Oral once  finasteride 5 milliGRAM(s) Oral daily  glucagon  Injectable 1 milliGRAM(s) IntraMuscular once  influenza   Vaccine 0.5 milliLiter(s) IntraMuscular once  insulin lispro (ADMELOG) corrective regimen sliding scale   SubCutaneous three times a day before meals  insulin lispro (ADMELOG) corrective regimen sliding scale   SubCutaneous at bedtime  potassium chloride    Tablet ER 40 milliEquivalent(s) Oral every 4 hours  potassium chloride   Solution 40 milliEquivalent(s) Oral once  tamsulosin 0.8 milliGRAM(s) Oral at bedtime    MEDICATIONS  (PRN):  acetaminophen     Tablet .. 650 milliGRAM(s) Oral every 6 hours PRN Temp greater or equal to 38C (100.4F), Mild Pain (1 - 3)  aluminum hydroxide/magnesium hydroxide/simethicone Suspension 30 milliLiter(s) Oral every 4 hours PRN Dyspepsia  atropine Injectable 1 milliGRAM(s) IV Push once PRN symptomatic bradycardia  melatonin 3 milliGRAM(s) Oral at bedtime PRN Insomnia  ondansetron Injectable 4 milliGRAM(s) IV Push every 8 hours PRN Nausea and/or Vomiting      CAPILLARY BLOOD GLUCOSE      POCT Blood Glucose.: 303 mg/dL (22 Dec 2022 08:06)  POCT Blood Glucose.: 153 mg/dL (21 Dec 2022 21:46)  POCT Blood Glucose.: 174 mg/dL (21 Dec 2022 19:34)  POCT Blood Glucose.: 187 mg/dL (21 Dec 2022 12:05)    I&O's Summary      PHYSICAL EXAM:  Vital Signs Last 24 Hrs  T(C): 36.7 (22 Dec 2022 04:17), Max: 37.6 (22 Dec 2022 02:52)  T(F): 98 (22 Dec 2022 04:17), Max: 99.6 (22 Dec 2022 02:52)  HR: 102 (22 Dec 2022 04:17) (51 - 106)  BP: 167/89 (22 Dec 2022 04:17) (140/66 - 186/94)  BP(mean): 83 (21 Dec 2022 13:34) (83 - 83)  RR: 18 (22 Dec 2022 04:17) (14 - 20)  SpO2: 98% (22 Dec 2022 04:17) (94% - 99%)    Parameters below as of 22 Dec 2022 04:17  Patient On (Oxygen Delivery Method): room air        CONSTITUTIONAL: NAD, well-developed  HEENT: normal conjunctiva, PEERLA  RESPIRATORY: Normal respiratory effort; lungs are clear to auscultation bilaterally  CARDIOVASCULAR: Regular rate and rhythm, normal S1 and S2, no murmur/rub/gallop;   ABDOMEN: No tenderness to palpation at all four quadrants, +BS  MUSCULOSKELETAL no clubbing or cyanosis of digits; no joint swelling or tenderness to palpation  EXTREMITIES No lower extremity edema; Peripheral pulses are 2+ bilaterally  SKIN: well-perfused, no dry skin    LABS:                        13.4   9.63  )-----------( 149      ( 22 Dec 2022 06:18 )             41.6     12-22    139  |  104  |  23  ----------------------------<  318<H>  3.2<L>   |  22  |  1.25    Ca    9.0      22 Dec 2022 06:19  Phos  2.2     12-22  Mg     1.9     12-22    TPro  6.6  /  Alb  3.9  /  TBili  0.5  /  DBili  x   /  AST  19  /  ALT  15  /  AlkPhos  62  12-22    PT/INR - ( 21 Dec 2022 06:58 )   PT: 12.9 sec;   INR: 1.11 ratio         PTT - ( 21 Dec 2022 06:58 )  PTT:28.4 sec            RADIOLOGY & ADDITIONAL TESTS:  Results Reviewed:   Imaging Personally Reviewed:  Electrocardiogram Personally Reviewed:    COORDINATION OF CARE:  Care Discussed with Consultants/Other Providers [Y/N]:  Prior or Outpatient Records Reviewed [Y/N]:   PROGRESS NOTE:     Patient is a 57y old  Male who presents with a chief complaint of bradycardia (21 Dec 2022 17:54)      SUBJECTIVE / OVERNIGHT EVENTS: Pt asleep and was woken up. Has no complaints. ICD site tender to palpation. Wants food.    ADDITIONAL REVIEW OF SYSTEMS: -ve    MEDICATIONS  (STANDING):  aspirin enteric coated 81 milliGRAM(s) Oral daily  atorvastatin 80 milliGRAM(s) Oral at bedtime  ceFAZolin   IVPB 2000 milliGRAM(s) IV Intermittent every 8 hours  cephalexin 250 milliGRAM(s) Oral every 6 hours  chlorhexidine 2% Cloths 1 Application(s) Topical <User Schedule>  clopidogrel Tablet 75 milliGRAM(s) Oral daily  dextrose 50% Injectable 25 Gram(s) IV Push once  dextrose 50% Injectable 12.5 Gram(s) IV Push once  dextrose 50% Injectable 25 Gram(s) IV Push once  dextrose Oral Gel 15 Gram(s) Oral once  finasteride 5 milliGRAM(s) Oral daily  glucagon  Injectable 1 milliGRAM(s) IntraMuscular once  influenza   Vaccine 0.5 milliLiter(s) IntraMuscular once  insulin lispro (ADMELOG) corrective regimen sliding scale   SubCutaneous three times a day before meals  insulin lispro (ADMELOG) corrective regimen sliding scale   SubCutaneous at bedtime  potassium chloride    Tablet ER 40 milliEquivalent(s) Oral every 4 hours  potassium chloride   Solution 40 milliEquivalent(s) Oral once  tamsulosin 0.8 milliGRAM(s) Oral at bedtime    MEDICATIONS  (PRN):  acetaminophen     Tablet .. 650 milliGRAM(s) Oral every 6 hours PRN Temp greater or equal to 38C (100.4F), Mild Pain (1 - 3)  aluminum hydroxide/magnesium hydroxide/simethicone Suspension 30 milliLiter(s) Oral every 4 hours PRN Dyspepsia  atropine Injectable 1 milliGRAM(s) IV Push once PRN symptomatic bradycardia  melatonin 3 milliGRAM(s) Oral at bedtime PRN Insomnia  ondansetron Injectable 4 milliGRAM(s) IV Push every 8 hours PRN Nausea and/or Vomiting      CAPILLARY BLOOD GLUCOSE      POCT Blood Glucose.: 303 mg/dL (22 Dec 2022 08:06)  POCT Blood Glucose.: 153 mg/dL (21 Dec 2022 21:46)  POCT Blood Glucose.: 174 mg/dL (21 Dec 2022 19:34)  POCT Blood Glucose.: 187 mg/dL (21 Dec 2022 12:05)    I&O's Summary      PHYSICAL EXAM:  Vital Signs Last 24 Hrs  T(C): 36.7 (22 Dec 2022 04:17), Max: 37.6 (22 Dec 2022 02:52)  T(F): 98 (22 Dec 2022 04:17), Max: 99.6 (22 Dec 2022 02:52)  HR: 102 (22 Dec 2022 04:17) (51 - 106)  BP: 167/89 (22 Dec 2022 04:17) (140/66 - 186/94)  BP(mean): 83 (21 Dec 2022 13:34) (83 - 83)  RR: 18 (22 Dec 2022 04:17) (14 - 20)  SpO2: 98% (22 Dec 2022 04:17) (94% - 99%)    Parameters below as of 22 Dec 2022 04:17  Patient On (Oxygen Delivery Method): room air        CONSTITUTIONAL: NAD, well-developed  HEENT: normal conjunctiva, PEERLA  RESPIRATORY: Normal respiratory effort; lungs are clear to auscultation bilaterally  CARDIOVASCULAR: V-paced on TELE, ICD site tender to palpation  ABDOMEN: No tenderness to palpation at all four quadrants, +BS  MUSCULOSKELETAL no clubbing or cyanosis of digits; no joint swelling or tenderness to palpation  EXTREMITIES No lower extremity edema; Peripheral pulses are 2+ bilaterally  SKIN: well-perfused, no dry skin    LABS:                        13.4   9.63  )-----------( 149      ( 22 Dec 2022 06:18 )             41.6     12-22    139  |  104  |  23  ----------------------------<  318<H>  3.2<L>   |  22  |  1.25    Ca    9.0      22 Dec 2022 06:19  Phos  2.2     12-22  Mg     1.9     12-22    TPro  6.6  /  Alb  3.9  /  TBili  0.5  /  DBili  x   /  AST  19  /  ALT  15  /  AlkPhos  62  12-22    PT/INR - ( 21 Dec 2022 06:58 )   PT: 12.9 sec;   INR: 1.11 ratio         PTT - ( 21 Dec 2022 06:58 )  PTT:28.4 sec            RADIOLOGY & ADDITIONAL TESTS:  Results Reviewed:   Imaging Personally Reviewed:  Electrocardiogram Personally Reviewed:    COORDINATION OF CARE:  Care Discussed with Consultants/Other Providers [Y/N]:  Prior or Outpatient Records Reviewed [Y/N]:   PROGRESS NOTE:     Patient is a 57y old  Male who presents with a chief complaint of bradycardia (21 Dec 2022 17:54)      SUBJECTIVE / OVERNIGHT EVENTS: Pt asleep and was woken up. Has no complaints. ICD site tender to palpation. Wants food.    However pt febrile to 103 at noon. Will initiate sepsis workup.    ADDITIONAL REVIEW OF SYSTEMS: -ve    MEDICATIONS  (STANDING):  aspirin enteric coated 81 milliGRAM(s) Oral daily  atorvastatin 80 milliGRAM(s) Oral at bedtime  ceFAZolin   IVPB 2000 milliGRAM(s) IV Intermittent every 8 hours  cephalexin 250 milliGRAM(s) Oral every 6 hours  chlorhexidine 2% Cloths 1 Application(s) Topical <User Schedule>  clopidogrel Tablet 75 milliGRAM(s) Oral daily  dextrose 50% Injectable 25 Gram(s) IV Push once  dextrose 50% Injectable 12.5 Gram(s) IV Push once  dextrose 50% Injectable 25 Gram(s) IV Push once  dextrose Oral Gel 15 Gram(s) Oral once  finasteride 5 milliGRAM(s) Oral daily  glucagon  Injectable 1 milliGRAM(s) IntraMuscular once  influenza   Vaccine 0.5 milliLiter(s) IntraMuscular once  insulin lispro (ADMELOG) corrective regimen sliding scale   SubCutaneous three times a day before meals  insulin lispro (ADMELOG) corrective regimen sliding scale   SubCutaneous at bedtime  potassium chloride    Tablet ER 40 milliEquivalent(s) Oral every 4 hours  potassium chloride   Solution 40 milliEquivalent(s) Oral once  tamsulosin 0.8 milliGRAM(s) Oral at bedtime    MEDICATIONS  (PRN):  acetaminophen     Tablet .. 650 milliGRAM(s) Oral every 6 hours PRN Temp greater or equal to 38C (100.4F), Mild Pain (1 - 3)  aluminum hydroxide/magnesium hydroxide/simethicone Suspension 30 milliLiter(s) Oral every 4 hours PRN Dyspepsia  atropine Injectable 1 milliGRAM(s) IV Push once PRN symptomatic bradycardia  melatonin 3 milliGRAM(s) Oral at bedtime PRN Insomnia  ondansetron Injectable 4 milliGRAM(s) IV Push every 8 hours PRN Nausea and/or Vomiting      CAPILLARY BLOOD GLUCOSE      POCT Blood Glucose.: 303 mg/dL (22 Dec 2022 08:06)  POCT Blood Glucose.: 153 mg/dL (21 Dec 2022 21:46)  POCT Blood Glucose.: 174 mg/dL (21 Dec 2022 19:34)  POCT Blood Glucose.: 187 mg/dL (21 Dec 2022 12:05)    I&O's Summary      PHYSICAL EXAM:  Vital Signs Last 24 Hrs  T(C): 36.7 (22 Dec 2022 04:17), Max: 37.6 (22 Dec 2022 02:52)  T(F): 98 (22 Dec 2022 04:17), Max: 99.6 (22 Dec 2022 02:52)  HR: 102 (22 Dec 2022 04:17) (51 - 106)  BP: 167/89 (22 Dec 2022 04:17) (140/66 - 186/94)  BP(mean): 83 (21 Dec 2022 13:34) (83 - 83)  RR: 18 (22 Dec 2022 04:17) (14 - 20)  SpO2: 98% (22 Dec 2022 04:17) (94% - 99%)    Parameters below as of 22 Dec 2022 04:17  Patient On (Oxygen Delivery Method): room air        CONSTITUTIONAL: NAD, well-developed  HEENT: normal conjunctiva, PEERLA  RESPIRATORY: Normal respiratory effort; lungs are clear to auscultation bilaterally  CARDIOVASCULAR: V-paced on TELE, ICD site tender to palpation  ABDOMEN: No tenderness to palpation at all four quadrants, +BS  MUSCULOSKELETAL no clubbing or cyanosis of digits; no joint swelling or tenderness to palpation  EXTREMITIES No lower extremity edema; Peripheral pulses are 2+ bilaterally  SKIN: well-perfused, no dry skin    LABS:                        13.4   9.63  )-----------( 149      ( 22 Dec 2022 06:18 )             41.6     12-22    139  |  104  |  23  ----------------------------<  318<H>  3.2<L>   |  22  |  1.25    Ca    9.0      22 Dec 2022 06:19  Phos  2.2     12-22  Mg     1.9     12-22    TPro  6.6  /  Alb  3.9  /  TBili  0.5  /  DBili  x   /  AST  19  /  ALT  15  /  AlkPhos  62  12-22    PT/INR - ( 21 Dec 2022 06:58 )   PT: 12.9 sec;   INR: 1.11 ratio         PTT - ( 21 Dec 2022 06:58 )  PTT:28.4 sec            RADIOLOGY & ADDITIONAL TESTS:  Results Reviewed:   Imaging Personally Reviewed:  Electrocardiogram Personally Reviewed:    COORDINATION OF CARE:  Care Discussed with Consultants/Other Providers [Y/N]:  Prior or Outpatient Records Reviewed [Y/N]:

## 2022-12-22 NOTE — DIETITIAN INITIAL EVALUATION ADULT - ADD RECOMMEND
1) Recommend discontinue current diet order: DASH/TLC. Recommend liberalizing diet to halal only to optimize PO intake.   2) Pt not amenable to oral nutritional supplements at this time, continue to monitor for acceptability.   3) Encourage adequate intake of meals to optimize PO intake.   4) Monitor PO intake/tolerance, weights, labs, hydration status, bowels, and skin integrity.

## 2022-12-22 NOTE — PROVIDER CONTACT NOTE (OTHER) - ASSESSMENT
Pt is hypertensive. Pt reports feeling suddenly cold. /98. . T 99.6. SpO2 98. Pt s/p PPM placement and had not received any BP meds all day for neuro concerns pre-op.

## 2022-12-22 NOTE — DISCHARGE NOTE PROVIDER - HOSPITAL COURSE
Patient is a 57 y M with a PMHx of CAD, CVA (2012 with no significant residual weakness), DM, p/w bradycardia. The day prior to admission, pt reports feeling dizzy, off-balance, unable to walk. He also experienced nausea and had some episodes of vomiting. He went to [facility] where he was noted to be bradycardic, was transferred here. Upon presentation, HR in the 30s. EKG concerning for 3:1, Mobitz type II AV block. Pt was given Atropine x3, with HRs remaining in the 40s. Pt denies chest pain, dyspnea, abdominal pain. Pt does not regularly follow up with a cardiologist but had seen one (for the first time), 2 months after being discharged from the ED of an OSH for abdominal pain. Per patient report, stress test was normal.      While in hospital, pt received LN biopsy and an ICD placement. Pt is stable for dc w/ outpt f/u w/ rheum and cardiology. Patient is a 57 y M with a PMHx of CAD, CVA (2012 with no significant residual weakness), DM, p/w bradycardia. The day prior to admission, pt reports feeling dizzy, off-balance, unable to walk. He also experienced nausea and had some episodes of vomiting. He went to [facility] where he was noted to be bradycardic, was transferred here. Upon presentation, HR in the 30s. EKG concerning for 3:1, Mobitz type II AV block. Pt was given Atropine x3, with HRs remaining in the 40s. Pt denies chest pain, dyspnea, abdominal pain. Pt does not regularly follow up with a cardiologist but had seen one (for the first time), 2 months after being discharged from the ED of an OSH for abdominal pain. Per patient report, stress test was normal.      While in hospital, pt received LN biopsy and an ICD placement on 12/21. Biopsy showed thyroid tissue without any lymph node involvement in the left supraclavicular node (see path report from 12/21/22 for more details), prompting a second biopsy scheduled for 12/27. Follow up biopsy showed _____________ Ultrasound of the thyroid showed Heterogeneous mass along the left lower pole, measuring 3.4 x 2.6 x 3.4 cm, possibly exophytic thyroid nodule. TSH upon admission was 0.6.    He also started experiencing fevers after ICD placement, was on vancomycin and zosyn (12/22) before being switched to vancomycin and cefepime (12/23-?).      Pt is stable for dc w/ outpt f/u w/ rheum and cardiology. Patient is a 57 y M with a PMHx of CAD, CVA (2012 with no significant residual weakness), DM, p/w bradycardia. The day prior to admission, pt reports feeling dizzy, off-balance, unable to walk. He also experienced nausea and had some episodes of vomiting. He went to [facility] where he was noted to be bradycardic, was transferred here. Upon presentation, HR in the 30s. EKG concerning for 3:1, Mobitz type II AV block. Pt was given Atropine x3, with HRs remaining in the 40s. Pt denies chest pain, dyspnea, abdominal pain. Pt does not regularly follow up with a cardiologist but had seen one (for the first time), 2 months after being discharged from the ED of an OSH for abdominal pain. Per patient report, stress test was normal.      While in hospital, pt received LN biopsy and an ICD placement on 12/21. Biopsy showed thyroid tissue without any lymph node involvement in the left supraclavicular node (see path report from 12/21/22 for more details), prompting a second biopsy scheduled for 12/27. Follow up biopsy showed _____________ Ultrasound of the thyroid showed Heterogeneous mass along the left lower pole, measuring 3.4 x 2.6 x 3.4 cm, possibly exophytic thyroid nodule. TSH upon admission was 0.6.    He also started experiencing fevers after ICD placement, was on vancomycin and zosyn (12/22) before being switched to vancomycin and cefepime (12/23-?).     Patient is a 57 y M with a PMHx of CAD, CVA (2012 with no significant residual weakness), DM, p/w bradycardia. The day prior to admission, pt reports feeling dizzy, off-balance, unable to walk. He also experienced nausea and had some episodes of vomiting. He went to [facility] where he was noted to be bradycardic, was transferred here. Upon presentation, HR in the 30s. EKG concerning for 3:1, Mobitz type II AV block. Pt was given Atropine x3, with HRs remaining in the 40s. Pt denies chest pain, dyspnea, abdominal pain. Pt does not regularly follow up with a cardiologist but had seen one (for the first time), 2 months after being discharged from the ED of an OSH for abdominal pain. Per patient report, stress test was normal. While here he was found to have mediastinal and supraclavicular lymphadenopathy (lymph node swelling). He also had fevers up to 102-103, which resolved for >24 hours before he was discharged home.     While in hospital, pt received a left supraclavicular lymph node biopsy and an ICD placement on 12/21. Biopsy showed thyroid tissue without any lymph node involvement in the left supraclavicular node (see path report from 12/21/22 for more details), prompting a second biopsy 12/27. Ultrasound of the thyroid showed Heterogeneous mass along the left lower pole, measuring 3.4 x 2.6 x 3.4 cm, possibly exophytic thyroid nodule. TSH upon admission was 0.6.    He also started experiencing fevers after ICD placement, was on vancomycin and zosyn (12/22) before being switched to vancomycin and cefepime (12/23-12/28). He completed his antibiotic course while inpatient.   Patient should obtain a fine needle aspiration biopsy of dominant left lower pole thyroid nodule, which we recommend he pursue as an outpatient. Hematology/oncology also evaluated the patient to potentially elucidate source of lymphadenopathy and see if it is related to potentially metastatic thyroid cancer. A CT Abdomen Pelvis was performed which showed a 9mm in size nodule of the bladder. Patient was also recommended to pursue a PET scan as further workup.     He was seen by Endocrinology. He will need to follow up with his Endocrinologist: Dr. Celaya to further workup his thyroid nodule and treat and monitor his diabetes. He also has a follow up appointment with rheumatology to rule out sarcoidosis an autoimmune process that may be contributing to his fevers.     He should also follow up at his appointment with the electrophysiologist (cardiology) Dr. Blair for follow up of his ICD at  Clinic on 1/6/22 @ 2:40pm.    Physical therapy also saw the patient while he was inpatient, and recommended home physical therapy.     Patient should also follow up with his primary care physician, Dr. Chris Mason, to help oversee further workup of his lymphadenopathy, bladder nodule, thyroid nodule, and general health. Patient was deemed stable for discharge home on 12/28/2022.       Patient is a 57 y M with a PMHx of CAD, CVA (2012 with no significant residual weakness), DM, p/w bradycardia. The day prior to admission, pt reports feeling dizzy, off-balance, unable to walk. He also experienced nausea and had some episodes of vomiting. He went to [facility] where he was noted to be bradycardic, was transferred here. Upon presentation, HR in the 30s. EKG concerning for 3:1, Mobitz type II AV block. Pt was given Atropine x3, with HRs remaining in the 40s. Pt denies chest pain, dyspnea, abdominal pain. Pt does not regularly follow up with a cardiologist but had seen one (for the first time), 2 months after being discharged from the ED of an OSH for abdominal pain. Per patient report, stress test was normal. While here he was found to have mediastinal and supraclavicular lymphadenopathy (lymph node swelling). He also had fevers up to 102-103, which resolved for >24 hours before he was discharged home.     While in hospital, pt received a left supraclavicular lymph node biopsy and an ICD placement on 12/21. Biopsy showed thyroid tissue without any lymph node involvement in the left supraclavicular node (see path report from 12/21/22 for more details), prompting a second biopsy 12/27. Ultrasound of the thyroid showed Heterogeneous mass along the left lower pole, measuring 3.4 x 2.6 x 3.4 cm, possibly exophytic thyroid nodule. TSH upon admission was 0.6.    He also started experiencing fevers after ICD placement, was on vancomycin and zosyn (12/22) before being switched to vancomycin and cefepime (12/23-12/28). He completed his antibiotic course while inpatient.   Patient should obtain a fine needle aspiration biopsy of dominant left lower pole thyroid nodule, which we recommend he pursue as an outpatient. Hematology/oncology also evaluated the patient to potentially elucidate source of lymphadenopathy and see if it is related to potentially metastatic thyroid cancer. A CT Abdomen Pelvis was performed which showed a 9mm in size nodule of the bladder. Patient was also recommended to pursue a PET scan as further workup.     He was seen by Endocrinology. He will need to follow up with his Endocrinologist: Dr. Celaya to further workup his thyroid nodule and treat and monitor his diabetes. He also has a follow up appointment with rheumatology to rule out sarcoidosis an autoimmune process that may be contributing to his fevers.     He should also follow up at his appointment with the electrophysiologist (cardiology) Dr. Blair for follow up of his ICD at  Clinic on 1/6/22 @ 2:40pm.    Physical therapy also saw the patient while he was inpatient, and recommended home physical therapy.     Patient should also follow up with his primary care physician, Dr. Chris Mason, to help oversee further workup of his lymphadenopathy, bladder nodule, thyroid nodule, and general health. Patient was deemed stable for discharge home on 12/28/2022.       Patient is a 57 y M with a PMHx of CAD, CVA (2012 with no significant residual weakness), DM, p/w bradycardia. The day prior to admission, pt reports feeling dizzy, off-balance, unable to walk. He also experienced nausea and had some episodes of vomiting. He went to [facility] where he was noted to be bradycardic, was transferred here. Upon presentation, HR in the 30s. EKG concerning for 3:1, Mobitz type II AV block. Pt was given Atropine x3, with HRs remaining in the 40s. Pt denies chest pain, dyspnea, abdominal pain. Pt does not regularly follow up with a cardiologist but had seen one (for the first time), 2 months after being discharged from the ED of an OSH for abdominal pain. Per patient report, stress test was normal. While here he was found to have mediastinal and supraclavicular lymphadenopathy (lymph node swelling). He also had fevers up to 102-103, which resolved for >24 hours before he was discharged home.     While in hospital, pt received a left supraclavicular lymph node biopsy and an ICD placement on 12/21. Biopsy showed thyroid tissue without any lymph node involvement in the left supraclavicular node (see path report from 12/21/22 for more details), prompting a second biopsy 12/27. Ultrasound of the thyroid showed Heterogeneous mass along the left lower pole, measuring 3.4 x 2.6 x 3.4 cm, possibly exophytic thyroid nodule. TSH upon admission was 0.6.    He also started experiencing fevers after ICD placement, was on vancomycin and zosyn (12/22) before being switched to vancomycin and cefepime (12/23-12/28). He completed his antibiotic course while inpatient.   Patient should obtain a fine needle aspiration biopsy of dominant left lower pole thyroid nodule, which we recommend he pursue as an outpatient. Hematology/oncology also evaluated the patient to potentially elucidate source of lymphadenopathy and see if it is related to potentially metastatic thyroid cancer. A CT Abdomen Pelvis was performed which showed a 9mm in size nodule of the bladder. Patient was also recommended to pursue a PET scan as further workup.     He was seen by Endocrinology. Please continue your home diabetes medications but discontinue glypizide. He will need to follow up with his Endocrinologist: Dr. Celaya to further workup his thyroid nodule and treat and monitor his diabetes. He also has a follow up appointment with rheumatology to rule out sarcoidosis an autoimmune process that may be contributing to his fevers.     He should also follow up at his appointment with the electrophysiologist (cardiology) Dr. Blair for follow up of his ICD at EP Clinic on 1/6/22 @ 2:40pm. Please also attend your PET scan scheduled at 10:15 AM on 1/4/2022 at [facility].     Physical therapy also saw the patient while he was inpatient, and recommended home physical therapy.     Patient should also follow up with his primary care physician, Dr. Chris Mason, to help oversee further workup of his lymphadenopathy, bladder nodule, thyroid nodule, and general health. Patient was deemed stable for discharge home on 12/28/2022.

## 2022-12-22 NOTE — PROGRESS NOTE ADULT - PROBLEM SELECTOR PLAN 2
-LAD seen on CT noncontrast   -pulm c/s, possible biopsy through ebus?  -rheum c/s -LAD seen on CT noncontrast   -pulm c/s, possible biopsy through ebus?  -rheum c/s: outpt f/u w/ biopsy result and lab work -EKG showed 3:1, Mobitz type II AV block. Pt was given Atropine x3, with HRs remaining in the 40s.  -no prior history   -TSH wnl  -TTE  -cardiac MRI  -ep following, appreciate recs  -CT chest noncon: mediastinal LAD  -ICD placement on 12/21 done

## 2022-12-22 NOTE — PROGRESS NOTE ADULT - ASSESSMENT
58yo male with PMH of prior CVA with mild left sided deficits, HTN, DM, vertigo and HLD  presents with Mobitz II 3:1 AVB, symptomatic. TTE nml  Secondary workup most remarkable for CT mediastinal and supraclavicular LN, raising concern for Sarcoidosis    1. Mobitz II AV Block  2. R/o Sarcoidosis    - s/p ICD implant ([employer]) 12/21/22  - CXR result noted, no ptx with leads in place  - ECG reviewed, Vpacing  - Monitor ICD wound site for bleeding or hematoma, dressing removed by EP attdg  - ICD interrogation by [employer] rep today with normal function  - ICD instructions reviewed with patient and family member at bedside  - Need outpatient PETScan r/o Sarcoidosis  - Follow up with EP Clinic on 1/6/22 @ 2:40pm    #[id number]

## 2022-12-22 NOTE — DISCHARGE NOTE PROVIDER - CARE PROVIDER_API CALL
Edinson Foote (MD)  Cardiac Electrophysiology; Cardiovascular Disease; Internal Medicine  [address] [place] [address]  Phone: [phone]  Fax: [phone]  Follow Up Time: 1 week    Jamilah Cardoza; MPH)  Internal Medicine; Rheumatology  [address] [facility] [facility] [address]  [place] [address]  Phone: [phone]  Fax: [phone]  Follow Up Time: 2 weeks

## 2022-12-22 NOTE — CHART NOTE - NSCHARTNOTEFT_GEN_A_CORE
Brief Endocrine Note:    This is 58 yo M /w a PMH of DM2 who presents with 2nd degree heart block s/p ICD /w concern for sarcoidosis. Patient has not been started on steroids at this time. Noted to have HbA1c of 6.3% on arrival, but FSG here in 200-300s only on moderate admelog scale. HE is currently septic with Tmax 103F today. Endocrine consulted for possible HHS    HHS is diagnosed when patient has serum glucose >600, serum osmolality >320 and with AMS. Patient does not meet criteria. No HAGMA and pH is 7.45 and thus DKA is ruled out. Patient remains with hyperglycemia.    Recommendations:  patient is completing 1L of IVF now according to primary team  start weight based basal-bolus regimen  Start lantus 18 units tonight  start admelog 6 units with meals  low admelog correction scale with meals and bedtime  carb consistent diet  fsg before meals and bedtime  FSG goal 100-180  please check fructosamine given disparity between serum glucose and HbA1c  hypoglycemia protocol to be in place if needed    Full consult tomorrow    Discussed with Dr. Alba Archuleta MD  Endocrinology Fellow

## 2022-12-22 NOTE — DISCHARGE NOTE PROVIDER - ATTENDING DISCHARGE PHYSICAL EXAMINATION:
Vital Signs Last 24 Hrs  T(C): 37.1 (28 Dec 2022 11:21), Max: 37.4 (27 Dec 2022 17:30)  T(F): 98.7 (28 Dec 2022 11:21), Max: 99.3 (27 Dec 2022 17:30)  HR: 87 (28 Dec 2022 14:16) (86 - 95)  BP: 143/73 (28 Dec 2022 14:16) (137/77 - 163/87)  BP(mean): 112 (27 Dec 2022 17:30) (112 - 112)  RR: 18 (28 Dec 2022 11:21) (17 - 18)  SpO2: 97% (28 Dec 2022 14:16) (95% - 98%)    Parameters below as of 28 Dec 2022 14:16  Patient On (Oxygen Delivery Method): room air        CONSTITUTIONAL: NAD, well-developed, well-groomed  EYES: PERRLA; conjunctiva and sclera clear  ENMT: Moist oral mucosa, no pharyngeal injection or exudates; normal dentition  NECK: Supple, no palpable masses; no thyromegaly  RESPIRATORY: Normal respiratory effort; lungs are clear to auscultation bilaterally  CARDIOVASCULAR: Regular rate and rhythm, normal S1 and S2, no murmur/rub/gallop; No lower extremity edema; Peripheral pulses are 2+ bilaterally  ABDOMEN: Nontender to palpation, normoactive bowel sounds, no rebound/guarding; No hepatosplenomegaly  MUSCULOSKELETAL:  Normal gait; no clubbing or cyanosis of digits; no joint swelling or tenderness to palpation  PSYCH: A+O to person, place, and time; affect appropriate  NEUROLOGY: CN 2-12 are intact and symmetric; no gross sensory deficits   SKIN: No rashes; no palpable lesions

## 2022-12-22 NOTE — DISCHARGE NOTE PROVIDER - NSDCFUADDAPPT_GEN_ALL_CORE_FT
APPTS ARE READY TO BE MADE: [x ] YES    Best Family or Patient Contact (if needed):    Additional Information about above appointments (if needed):    1: Dr. Whitehead -- cardiology  2: Dr. Cardoza -- rheumatology  3:     Other comments or requests:    APPTS ARE READY TO BE MADE: [x ] YES    Best Family or Patient Contact (if needed):    Additional Information about above appointments (if needed):    1: Dr. Whitehead -- cardiology  2: Dr. Cardoza -- rheumatology  3:     Other comments or requests:   Patient was previously scheduled with Dr. Edinson Foote on 01/06 2:40pm at [address] [id number].					          -Patient was rescheduled with Dr. Edinson Foote on 01/17 11:40am at [address] [place] [phone] through the provider's office. Patient advised of appointment details. 																	                                           Patient was scheduled with Dr. Trista Burr on 12/27 11am at [address] [place] [phone] through the providers office. Patient advised of appointment details.     APPTS ARE READY TO BE MADE: [x ] YES    Best Family or Patient Contact (if needed):    Additional Information about above appointments (if needed):    1: Dr. Whitehead -- cardiology  2: Dr. Cardoza -- rheumatology  3: Dr. Simmons -- endocrinology     Other comments or requests:   Patient was previously scheduled with Dr. Edinson Foote on 01/06 2:40pm at [address] [place] [address].					          -Patient was rescheduled with Dr. Edinson Foote on 01/17 11:40am at [address] [place] [phone] through the provider's office. Patient advised of appointment details. 																	                                           Patient was scheduled with Dr. Trista Burr on 12/27 11am at [address] [place] [phone] through the providers office. Patient advised of appointment details.     APPTS ARE READY TO BE MADE: [x ] YES    Best Family or Patient Contact (if needed):    Additional Information about above appointments (if needed):    1: Dr. Whitehead -- cardiology  2: Dr. Cardoza -- rheumatology  3: Dr. Simmons -- endocrinology     Other comments or requests:   Patient was previously scheduled with Dr. Edinson Foote on 01/06 2:40pm at [address] [place] [address].					          -Patient was rescheduled with Dr. Edinson Foote on 01/17 11:40am at [address] [place] [phone] through the provider's office. Patient advised of appointment details. 																	                                           Patient was scheduled with Dr. Trista Burr on 12/27 11am at [address] [place] [phone] through the providers office. Patient advised of appointment details.    Patient was provided with follow up request details and was advised to call to schedule follow up within specified time frame.

## 2022-12-22 NOTE — DIETITIAN INITIAL EVALUATION ADULT - NSFNSGIIOFT_GEN_A_CORE
- Pt denies nausea, vomiting, diarrhea, or constipation.   - Last BM: none thus far; not currently ordered for bowel regimen.

## 2022-12-23 DIAGNOSIS — E11.9 TYPE 2 DIABETES MELLITUS WITHOUT COMPLICATIONS: ICD-10-CM

## 2022-12-23 DIAGNOSIS — E11.65 TYPE 2 DIABETES MELLITUS WITH HYPERGLYCEMIA: ICD-10-CM

## 2022-12-23 DIAGNOSIS — R73.9 HYPERGLYCEMIA, UNSPECIFIED: ICD-10-CM

## 2022-12-23 DIAGNOSIS — E78.5 HYPERLIPIDEMIA, UNSPECIFIED: ICD-10-CM

## 2022-12-23 LAB
ACE SERPL-CCNC: 33 U/L — SIGNIFICANT CHANGE UP (ref 14–82)
ALBUMIN SERPL ELPH-MCNC: 3.4 G/DL — SIGNIFICANT CHANGE UP (ref 3.3–5)
ALP SERPL-CCNC: 56 U/L — SIGNIFICANT CHANGE UP (ref 40–120)
ALT FLD-CCNC: 13 U/L — SIGNIFICANT CHANGE UP (ref 10–45)
ANION GAP SERPL CALC-SCNC: 11 MMOL/L — SIGNIFICANT CHANGE UP (ref 5–17)
AST SERPL-CCNC: 21 U/L — SIGNIFICANT CHANGE UP (ref 10–40)
BASOPHILS # BLD AUTO: 0.02 K/UL — SIGNIFICANT CHANGE UP (ref 0–0.2)
BASOPHILS NFR BLD AUTO: 0.2 % — SIGNIFICANT CHANGE UP (ref 0–2)
BILIRUB SERPL-MCNC: 0.8 MG/DL — SIGNIFICANT CHANGE UP (ref 0.2–1.2)
BUN SERPL-MCNC: 21 MG/DL — SIGNIFICANT CHANGE UP (ref 7–23)
CALCIUM SERPL-MCNC: 8.6 MG/DL — SIGNIFICANT CHANGE UP (ref 8.4–10.5)
CHLORIDE SERPL-SCNC: 106 MMOL/L — SIGNIFICANT CHANGE UP (ref 96–108)
CO2 SERPL-SCNC: 23 MMOL/L — SIGNIFICANT CHANGE UP (ref 22–31)
CREAT SERPL-MCNC: 1.24 MG/DL — SIGNIFICANT CHANGE UP (ref 0.5–1.3)
CULTURE RESULTS: SIGNIFICANT CHANGE UP
EGFR: 69 ML/MIN/1.73M2 — SIGNIFICANT CHANGE UP
EOSINOPHIL # BLD AUTO: 0.06 K/UL — SIGNIFICANT CHANGE UP (ref 0–0.5)
EOSINOPHIL NFR BLD AUTO: 0.6 % — SIGNIFICANT CHANGE UP (ref 0–6)
FRUCTOSAMINE SERPL-MCNC: 255 UMOL/L — SIGNIFICANT CHANGE UP (ref 205–285)
GLUCOSE BLDC GLUCOMTR-MCNC: 183 MG/DL — HIGH (ref 70–99)
GLUCOSE BLDC GLUCOMTR-MCNC: 204 MG/DL — HIGH (ref 70–99)
GLUCOSE BLDC GLUCOMTR-MCNC: 215 MG/DL — HIGH (ref 70–99)
GLUCOSE BLDC GLUCOMTR-MCNC: 315 MG/DL — HIGH (ref 70–99)
GLUCOSE SERPL-MCNC: 245 MG/DL — HIGH (ref 70–99)
HCT VFR BLD CALC: 38.6 % — LOW (ref 39–50)
HGB BLD-MCNC: 12.2 G/DL — LOW (ref 13–17)
IMM GRANULOCYTES NFR BLD AUTO: 0.4 % — SIGNIFICANT CHANGE UP (ref 0–0.9)
LYMPHOCYTES # BLD AUTO: 0.94 K/UL — LOW (ref 1–3.3)
LYMPHOCYTES # BLD AUTO: 9.5 % — LOW (ref 13–44)
MAGNESIUM SERPL-MCNC: 1.9 MG/DL — SIGNIFICANT CHANGE UP (ref 1.6–2.6)
MCHC RBC-ENTMCNC: 25.3 PG — LOW (ref 27–34)
MCHC RBC-ENTMCNC: 31.6 GM/DL — LOW (ref 32–36)
MCV RBC AUTO: 80.1 FL — SIGNIFICANT CHANGE UP (ref 80–100)
MONOCYTES # BLD AUTO: 0.85 K/UL — SIGNIFICANT CHANGE UP (ref 0–0.9)
MONOCYTES NFR BLD AUTO: 8.6 % — SIGNIFICANT CHANGE UP (ref 2–14)
NEUTROPHILS # BLD AUTO: 7.96 K/UL — HIGH (ref 1.8–7.4)
NEUTROPHILS NFR BLD AUTO: 80.7 % — HIGH (ref 43–77)
NON-GYNECOLOGICAL CYTOLOGY STUDY: SIGNIFICANT CHANGE UP
NRBC # BLD: 0 /100 WBCS — SIGNIFICANT CHANGE UP (ref 0–0)
OSMOLALITY SERPL: 301 MOSMOL/KG — HIGH (ref 275–300)
PHOSPHATE SERPL-MCNC: 2.4 MG/DL — LOW (ref 2.5–4.5)
PLATELET # BLD AUTO: 124 K/UL — LOW (ref 150–400)
POTASSIUM SERPL-MCNC: 3.4 MMOL/L — LOW (ref 3.5–5.3)
POTASSIUM SERPL-SCNC: 3.4 MMOL/L — LOW (ref 3.5–5.3)
PROT SERPL-MCNC: 6.1 G/DL — SIGNIFICANT CHANGE UP (ref 6–8.3)
RBC # BLD: 4.82 M/UL — SIGNIFICANT CHANGE UP (ref 4.2–5.8)
RBC # FLD: 14.6 % — HIGH (ref 10.3–14.5)
SODIUM SERPL-SCNC: 140 MMOL/L — SIGNIFICANT CHANGE UP (ref 135–145)
SPECIMEN SOURCE: SIGNIFICANT CHANGE UP
VIT D25+D1,25 OH+D1,25 PNL SERPL-MCNC: 49.9 PG/ML — SIGNIFICANT CHANGE UP (ref 19.9–79.3)
WBC # BLD: 9.87 K/UL — SIGNIFICANT CHANGE UP (ref 3.8–10.5)
WBC # FLD AUTO: 9.87 K/UL — SIGNIFICANT CHANGE UP (ref 3.8–10.5)

## 2022-12-23 PROCEDURE — 71046 X-RAY EXAM CHEST 2 VIEWS: CPT | Mod: 26

## 2022-12-23 PROCEDURE — 99222 1ST HOSP IP/OBS MODERATE 55: CPT

## 2022-12-23 PROCEDURE — 99233 SBSQ HOSP IP/OBS HIGH 50: CPT

## 2022-12-23 PROCEDURE — 76536 US EXAM OF HEAD AND NECK: CPT | Mod: 26

## 2022-12-23 RX ORDER — INSULIN LISPRO 100/ML
8 VIAL (ML) SUBCUTANEOUS
Refills: 0 | Status: DISCONTINUED | OUTPATIENT
Start: 2022-12-23 | End: 2022-12-28

## 2022-12-23 RX ORDER — CEFEPIME 1 G/1
1000 INJECTION, POWDER, FOR SOLUTION INTRAMUSCULAR; INTRAVENOUS ONCE
Refills: 0 | Status: COMPLETED | OUTPATIENT
Start: 2022-12-23 | End: 2022-12-23

## 2022-12-23 RX ORDER — POTASSIUM CHLORIDE 20 MEQ
40 PACKET (EA) ORAL ONCE
Refills: 0 | Status: COMPLETED | OUTPATIENT
Start: 2022-12-23 | End: 2022-12-23

## 2022-12-23 RX ORDER — CEFEPIME 1 G/1
1000 INJECTION, POWDER, FOR SOLUTION INTRAMUSCULAR; INTRAVENOUS EVERY 8 HOURS
Refills: 0 | Status: DISCONTINUED | OUTPATIENT
Start: 2022-12-23 | End: 2022-12-28

## 2022-12-23 RX ORDER — CEPHALEXIN 500 MG
1 CAPSULE ORAL
Qty: 4 | Refills: 0
Start: 2022-12-23 | End: 2022-12-23

## 2022-12-23 RX ORDER — CEFEPIME 1 G/1
INJECTION, POWDER, FOR SOLUTION INTRAMUSCULAR; INTRAVENOUS
Refills: 0 | Status: DISCONTINUED | OUTPATIENT
Start: 2022-12-23 | End: 2022-12-28

## 2022-12-23 RX ORDER — INSULIN GLARGINE 100 [IU]/ML
24 INJECTION, SOLUTION SUBCUTANEOUS AT BEDTIME
Refills: 0 | Status: DISCONTINUED | OUTPATIENT
Start: 2022-12-23 | End: 2022-12-28

## 2022-12-23 RX ADMIN — Medication 650 MILLIGRAM(S): at 05:26

## 2022-12-23 RX ADMIN — Medication 6 UNIT(S): at 08:09

## 2022-12-23 RX ADMIN — Medication 650 MILLIGRAM(S): at 04:40

## 2022-12-23 RX ADMIN — Medication 6 UNIT(S): at 16:38

## 2022-12-23 RX ADMIN — TAMSULOSIN HYDROCHLORIDE 0.8 MILLIGRAM(S): 0.4 CAPSULE ORAL at 21:33

## 2022-12-23 RX ADMIN — Medication 40 MILLIEQUIVALENT(S): at 11:42

## 2022-12-23 RX ADMIN — Medication 4: at 16:38

## 2022-12-23 RX ADMIN — PIPERACILLIN AND TAZOBACTAM 25 GRAM(S): 4; .5 INJECTION, POWDER, LYOPHILIZED, FOR SOLUTION INTRAVENOUS at 00:04

## 2022-12-23 RX ADMIN — CHLORHEXIDINE GLUCONATE 1 APPLICATION(S): 213 SOLUTION TOPICAL at 05:03

## 2022-12-23 RX ADMIN — Medication 6 UNIT(S): at 12:20

## 2022-12-23 RX ADMIN — Medication 650 MILLIGRAM(S): at 11:43

## 2022-12-23 RX ADMIN — Medication 250 MILLIGRAM(S): at 17:06

## 2022-12-23 RX ADMIN — FINASTERIDE 5 MILLIGRAM(S): 5 TABLET, FILM COATED ORAL at 11:33

## 2022-12-23 RX ADMIN — CLOPIDOGREL BISULFATE 75 MILLIGRAM(S): 75 TABLET, FILM COATED ORAL at 11:32

## 2022-12-23 RX ADMIN — ATORVASTATIN CALCIUM 80 MILLIGRAM(S): 80 TABLET, FILM COATED ORAL at 21:33

## 2022-12-23 RX ADMIN — CEFEPIME 100 MILLIGRAM(S): 1 INJECTION, POWDER, FOR SOLUTION INTRAMUSCULAR; INTRAVENOUS at 21:32

## 2022-12-23 RX ADMIN — Medication 81 MILLIGRAM(S): at 11:32

## 2022-12-23 RX ADMIN — AMLODIPINE BESYLATE 10 MILLIGRAM(S): 2.5 TABLET ORAL at 05:26

## 2022-12-23 RX ADMIN — PIPERACILLIN AND TAZOBACTAM 25 GRAM(S): 4; .5 INJECTION, POWDER, LYOPHILIZED, FOR SOLUTION INTRAVENOUS at 05:56

## 2022-12-23 RX ADMIN — Medication 650 MILLIGRAM(S): at 12:04

## 2022-12-23 RX ADMIN — PIPERACILLIN AND TAZOBACTAM 25 GRAM(S): 4; .5 INJECTION, POWDER, LYOPHILIZED, FOR SOLUTION INTRAVENOUS at 13:11

## 2022-12-23 RX ADMIN — Medication 40 MILLIEQUIVALENT(S): at 05:56

## 2022-12-23 RX ADMIN — Medication 2: at 12:20

## 2022-12-23 RX ADMIN — Medication 650 MILLIGRAM(S): at 21:33

## 2022-12-23 RX ADMIN — CEFEPIME 100 MILLIGRAM(S): 1 INJECTION, POWDER, FOR SOLUTION INTRAMUSCULAR; INTRAVENOUS at 18:54

## 2022-12-23 RX ADMIN — Medication 250 MILLIGRAM(S): at 05:42

## 2022-12-23 RX ADMIN — Medication 2: at 08:09

## 2022-12-23 RX ADMIN — Medication 650 MILLIGRAM(S): at 22:33

## 2022-12-23 RX ADMIN — INSULIN GLARGINE 24 UNIT(S): 100 INJECTION, SOLUTION SUBCUTANEOUS at 21:32

## 2022-12-23 NOTE — PROGRESS NOTE ADULT - ATTENDING COMMENTS
57 M with a PMHx of CAD, CVA (2012 with no significant residual weakness), DM, p/w bradycardia. EKG concerning for 3:1, Mobitz type II AV block. CT chest: Mediastinal and supraclavicular lymph nodes. ICD placed on 12/21.. However pt spiked fever during hospital stay and will keep pt for sepsis workup. Receiving vancomycin and zosyn.     Preliminary pathology reports of L supraclavicular LN positive for follicular thyroid tissue (read over the phone by Dr. Liset Ahn [phone]). Unsure if tissue was sampled near the thyroid or thyroid tissue originates from a source of malignancy/ectopic area. Discussed case with Dr. Ahn as well as radiologist Dr. Morgan Reece. A second biopsy would be beneficial to confirm pathology report. Spoke to patient and the family, stated it would be best if he did stay during this hospitalization for IR-guided biopsy on Tuesday (inpatient with Dr. Morgan Reece at 4pm). States he has enlarged thyroid and had prior workup outpatient (but unable to specify), states his PCP has more information. Attempted to call Dr. Chris Mason (PCP office at [phone]) but he was not available today. Discussed case with EP physician (Dr. Foote), recommend close outpatient follow up once stable, will continue to follow. Aware of preliminary biopsy results.      -F/U with Thyroid U/S to rule out nodules   -C/w Zosyn and Vancomycin  -Monitor fever curve, Tylenol as needed   -Highly recommend outpatient PET/CT scan   -If patient is stable and wants to be discharged, please ensure patient needs to get script from outpatient provider for second biopsy of LN. If patient is not stable for DC, would highly recommend patient gets the IR-guided biopsy in this hospitalization. Discussed with patient/wife. Will follow up closely. Discussed case with resident.   -Close outpt f/u w/ cards and rheum  -ID following.

## 2022-12-23 NOTE — PROGRESS NOTE ADULT - SUBJECTIVE AND OBJECTIVE BOX
24H hour events:   Resting comfortably in bed; no c/o presented except for minimal incisional discomfort at ICD site; intermittent fever overnight to 101 at 4:38am, given Tylenol with resolution of fever post tylenol; ambulates to bathroom, wife at bedside    MEDICATIONS:  amLODIPine   Tablet 10 milliGRAM(s) Oral daily  aspirin enteric coated 81 milliGRAM(s) Oral daily  clopidogrel Tablet 75 milliGRAM(s) Oral daily  piperacillin/tazobactam IVPB.. 3.375 Gram(s) IV Intermittent every 8 hours  vancomycin  IVPB 1000 milliGRAM(s) IV Intermittent every 12 hours  acetaminophen     Tablet .. 650 milliGRAM(s) Oral every 6 hours PRN  melatonin 3 milliGRAM(s) Oral at bedtime PRN  ondansetron Injectable 4 milliGRAM(s) IV Push every 8 hours PRN  aluminum hydroxide/magnesium hydroxide/simethicone Suspension 30 milliLiter(s) Oral every 4 hours PRN  atropine Injectable 1 milliGRAM(s) IV Push once PRN  atorvastatin 80 milliGRAM(s) Oral at bedtime  dextrose 50% Injectable 25 Gram(s) IV Push once  dextrose 50% Injectable 12.5 Gram(s) IV Push once  dextrose 50% Injectable 25 Gram(s) IV Push once  dextrose Oral Gel 15 Gram(s) Oral once  finasteride 5 milliGRAM(s) Oral daily  glucagon  Injectable 1 milliGRAM(s) IntraMuscular once  insulin glargine Injectable (LANTUS) 18 Unit(s) SubCutaneous at bedtime  insulin lispro (ADMELOG) corrective regimen sliding scale   SubCutaneous three times a day before meals  insulin lispro (ADMELOG) corrective regimen sliding scale   SubCutaneous at bedtime  insulin lispro Injectable (ADMELOG) 6 Unit(s) SubCutaneous three times a day before meals  chlorhexidine 2% Cloths 1 Application(s) Topical <User Schedule>  influenza   Vaccine 0.5 milliLiter(s) IntraMuscular once  lactated ringers 1000 milliLiter(s) IV Continuous <Continuous>  potassium chloride    Tablet ER 40 milliEquivalent(s) Oral once  potassium chloride   Solution 40 milliEquivalent(s) Oral once  tamsulosin 0.8 milliGRAM(s) Oral at bedtime      REVIEW OF SYSTEMS:  Complete 12point ROS negative.    PHYSICAL EXAM:  T(C): 37.4 (12-23-22 @ 08:17), Max: 39.4 (12-22-22 @ 12:39)  HR: 88 (12-23-22 @ 08:17) (88 - 106)  BP: 150/86 (12-23-22 @ 08:17) (148/89 - 162/83)  RR: 18 (12-23-22 @ 08:17) (18 - 18)  SpO2: 97% (12-23-22 @ 08:17) (95% - 98%)  Wt(kg): --  I&O's Summary      Appearance: well develop, in NAD  Head: normocephalic, atraumatic  Neck: supple  Cardiovascular: RRR S1 S2, No JVD, No m/r/g  Respiratory: Lungs clear to auscultation	  Psychiatry: A & O x 3, Mood & affect appropriate  Gastrointestinal:  Soft, Non-tender, + BS	  : voiding  Skin: No rashes, No ecchymoses, left infraclavicular ICD site without redness or hematoma	  Neurologic: Non-focal  Extremities: Normal range of motion, no c/c/e  Vascular: Peripheral pulses palpable 2+ bilaterally        LABS:	 	    CBC Full  -  ( 23 Dec 2022 06:41 )  WBC Count : 9.87 K/uL  Hemoglobin : 12.2 g/dL  Hematocrit : 38.6 %  Platelet Count - Automated : 124 K/uL  Mean Cell Volume : 80.1 fl  Mean Cell Hemoglobin : 25.3 pg  Mean Cell Hemoglobin Concentration : 31.6 gm/dL  Auto Neutrophil # : 7.96 K/uL  Auto Lymphocyte # : 0.94 K/uL  Auto Monocyte # : 0.85 K/uL  Auto Eosinophil # : 0.06 K/uL  Auto Basophil # : 0.02 K/uL  Auto Neutrophil % : 80.7 %  Auto Lymphocyte % : 9.5 %  Auto Monocyte % : 8.6 %  Auto Eosinophil % : 0.6 %  Auto Basophil % : 0.2 %    12-23    140  |  106  |  21  ----------------------------<  245<H>  3.4<L>   |  23  |  1.24  12-22    140  |  104  |  24<H>  ----------------------------<  362<H>  3.5   |  21<L>  |  1.39<H>    Ca    8.6      23 Dec 2022 06:41  Ca    8.8      22 Dec 2022 18:18  Phos  2.4     12-23  Phos  2.4     12-22  Mg     1.9     12-23  Mg     2.0     12-22    TPro  6.1  /  Alb  3.4  /  TBili  0.8  /  DBili  x   /  AST  21  /  ALT  13  /  AlkPhos  56  12-23  TPro  6.6  /  Alb  4.1  /  TBili  0.6  /  DBili  x   /  AST  15  /  ALT  14  /  AlkPhos  61  12-22      proBNP:   Lipid Profile:   HgA1c:   TSH:       CARDIAC MARKERS:      TELEMETRY: Asense Vpaced 80s      24H hour events:   Resting comfortably in bed; no c/o presented except for minimal incisional discomfort at ICD site; intermittent fever overnight to 101 at 4:38am, given Tylenol with resolution of fever post tylenol; ambulates to bathroom, wife at bedside    MEDICATIONS:  amLODIPine   Tablet 10 milliGRAM(s) Oral daily  aspirin enteric coated 81 milliGRAM(s) Oral daily  clopidogrel Tablet 75 milliGRAM(s) Oral daily  piperacillin/tazobactam IVPB.. 3.375 Gram(s) IV Intermittent every 8 hours  vancomycin  IVPB 1000 milliGRAM(s) IV Intermittent every 12 hours  acetaminophen     Tablet .. 650 milliGRAM(s) Oral every 6 hours PRN  melatonin 3 milliGRAM(s) Oral at bedtime PRN  ondansetron Injectable 4 milliGRAM(s) IV Push every 8 hours PRN  aluminum hydroxide/magnesium hydroxide/simethicone Suspension 30 milliLiter(s) Oral every 4 hours PRN  atorvastatin 80 milliGRAM(s) Oral at bedtime  finasteride 5 milliGRAM(s) Oral daily  insulin glargine Injectable (LANTUS) 18 Unit(s) SubCutaneous at bedtime  insulin lispro (ADMELOG) corrective regimen sliding scale   SubCutaneous three times a day before meals  insulin lispro (ADMELOG) corrective regimen sliding scale   SubCutaneous at bedtime  insulin lispro Injectable (ADMELOG) 6 Unit(s) SubCutaneous three times a day before meals  chlorhexidine 2% Cloths 1 Application(s) Topical <User Schedule>  influenza   Vaccine 0.5 milliLiter(s) IntraMuscular once  lactated ringers 1000 milliLiter(s) IV Continuous <Continuous>  potassium chloride    Tablet ER 40 milliEquivalent(s) Oral once  potassium chloride   Solution 40 milliEquivalent(s) Oral once  tamsulosin 0.8 milliGRAM(s) Oral at bedtime    REVIEW OF SYSTEMS: Complete 12point ROS negative.    PHYSICAL EXAM:  T(C): 37.4 (12-23-22 @ 08:17), Max: 39.4 (12-22-22 @ 12:39)  HR: 88 (12-23-22 @ 08:17) (88 - 106)  BP: 150/86 (12-23-22 @ 08:17) (148/89 - 162/83)  RR: 18 (12-23-22 @ 08:17) (18 - 18)  SpO2: 97% (12-23-22 @ 08:17) (95% - 98%)    Appearance: well develop, in NAD  Head: normocephalic, atraumatic  Neck: supple  Cardiovascular: RRR S1 S2, No JVD, No m/r/g  Respiratory: Lungs clear to auscultation	  Psychiatry: A & O x 3, Mood & affect appropriate  Gastrointestinal:  Soft, Non-tender, + BS	  : voiding  Skin: No rashes, No ecchymoses, left infraclavicular ICD site without redness or hematoma	  Neurologic: Non-focal  Extremities: Normal range of motion, no c/c/e  Vascular: Peripheral pulses palpable 2+ bilaterally    LABS:	 	    CBC Full  -  ( 23 Dec 2022 06:41 )  WBC Count : 9.87 K/uL  Hemoglobin : 12.2 g/dL  Hematocrit : 38.6 %  Platelet Count - Automated : 124 K/uL  Mean Cell Volume : 80.1 fl  Mean Cell Hemoglobin : 25.3 pg  Mean Cell Hemoglobin Concentration : 31.6 gm/dL  Auto Neutrophil # : 7.96 K/uL  Auto Lymphocyte # : 0.94 K/uL  Auto Monocyte # : 0.85 K/uL  Auto Eosinophil # : 0.06 K/uL  Auto Basophil # : 0.02 K/uL  Auto Neutrophil % : 80.7 %  Auto Lymphocyte % : 9.5 %  Auto Monocyte % : 8.6 %  Auto Eosinophil % : 0.6 %  Auto Basophil % : 0.2 %    12-23    140  |  106  |  21  ----------------------------<  245<H>  3.4<L>   |  23  |  1.24  12-22    140  |  104  |  24<H>  ----------------------------<  362<H>  3.5   |  21<L>  |  1.39<H>    Ca    8.6      23 Dec 2022 06:41  Ca    8.8      22 Dec 2022 18:18  Phos  2.4     12-23  Phos  2.4     12-22  Mg     1.9     12-23  Mg     2.0     12-22    TPro  6.1  /  Alb  3.4  /  TBili  0.8  /  DBili  x   /  AST  21  /  ALT  13  /  AlkPhos  56  12-23  TPro  6.6  /  Alb  4.1  /  TBili  0.6  /  DBili  x   /  AST  15  /  ALT  14  /  AlkPhos  61  12-22    TELEMETRY: Asense Vpaced 80s

## 2022-12-23 NOTE — PROGRESS NOTE ADULT - PROBLEM SELECTOR PLAN 4
-c/w atorvastatin  -CT angio head:  CTA BRAIN: Complete occlusion of the right distal V3 vertebral artery segment and multifocal occlusions with segmental opacification of the right intracranial V4 segment extending to the basilar confluence, the   chronicity of which is unknown. Further workup with MRA may be helpful in the evaluation for dissection. Multifocal severe narrowing of the left V4 segment.    Atherosclerotic changes with moderate stenosis of the right carotid siphon and mild stenosis of the left carotid siphon.    CTA NECK: Severe atherosclerotic narrowing at the origin of the left vertebral artery and mild to moderate narrowing involving both vertebral artery V2 segments. Noncalcified plaque with less than 50% stenosis at   the right proximal cervical ICA by NASCET criteria.    Plan: will dc pt on reduced dose of aspirin 81 and plavix 75

## 2022-12-23 NOTE — PROGRESS NOTE ADULT - ASSESSMENT
Patient is a 57 y M with a PMHx of CAD, CVA (2012 with no significant residual weakness), DM, p/w bradycardia. EKG concerning for 3:1, Mobitz type II AV block. CT chest: Mediastinal and supraclavicular lymph nodes. ICD placed. Stable for dc w/ close outpt f/u w/ cards and rheum. However pt spiked fever during hospital stay and will keep pt for sepsis workup.     Patient is a 57 y M with a PMHx of CAD, CVA (2012 with no significant residual weakness), DM, p/w bradycardia. EKG concerning for 3:1, Mobitz type II AV block. CT chest: Mediastinal and supraclavicular lymph nodes. ICD placed on 12/21. Stable for dc w/ close outpt f/u w/ cards and rheum. However pt spiked fever during hospital stay and will keep pt for sepsis workup. Receiving vancomycin and zosyn.

## 2022-12-23 NOTE — CONSULT NOTE ADULT - SUBJECTIVE AND OBJECTIVE BOX
HPI:  Patient is a 57 y M with a PMHx of, CVA (2012 with no significant residual weakness), T2DM, p/w bradycardia. The day prior to admission, pt reports feeling dizzy, off-balance, unable to walk. He also experienced nausea and had some episodes of vomiting. He went to [facility] where he was noted to be bradycardic, was transferred here. Upon presentation, HR in the 30s. EKG concerning for 3:1, Mobitz type II AV block. Pt was given Atropine x3, with HRs remaining in the 40s. Pt denies chest pain, dyspnea, abdominal pain. Pt does not regularly follow up with a cardiologist but had seen one (for the first time), 2 months after being discharged from the ED of an OSH for abdominal pain. Per patient report, stress test was normal.      Pt denies sick contacts and recent travel. (20 Dec 2022 09:50)    Consulted for: Uncontrolled T2DM    Diabetes History:   Most recent A1c 6.3%  -Diagnosed several years ago   -Endocrinologist - Dr. Medina  -Current Regimen - Metformin 1g BID, Steglatro 15 mg daily, Glipizide 5 mg daily  -FS at home - Typically 140s-150s  -Diet - well controlled  -Complications/Diabetes HCM - CVA in the past      PAST MEDICAL & SURGICAL HISTORY:  CVA (cerebrovascular accident)      HTN (hypertension)      DM (diabetes mellitus)      HTN (hypertension)      FAMILY HISTORY: Family hx of T2DM    Social History: No tobacco use    Home Medications:  alfuzosin 10 mg oral tablet, extended release: 1 tab(s) orally once a day (22 Dec 2022 15:59)  amLODIPine 10 mg oral tablet: 1 tab(s) orally once a day (22 Dec 2022 15:59)  atorvastatin 80 mg oral tablet: 1 tab(s) orally once a day (22 Dec 2022 15:59)  finasteride 5 mg oral tablet: 1 tab(s) orally once a day (22 Dec 2022 15:59)  glipiZIDE 5 mg oral tablet: 1 tab(s) orally once a day (22 Dec 2022 15:59)  losartan 50 mg oral tablet: 1 tab(s) orally once a day (22 Dec 2022 15:59)  metFORMIN 1000 mg oral tablet: 1 tab(s) orally 2 times a day (22 Dec 2022 15:59)  Steglatro 15 mg oral tablet: 1 tab(s) orally once a day (in the morning) (22 Dec 2022 15:59)      MEDICATIONS  (STANDING):  amLODIPine   Tablet 10 milliGRAM(s) Oral daily  aspirin enteric coated 81 milliGRAM(s) Oral daily  atorvastatin 80 milliGRAM(s) Oral at bedtime  chlorhexidine 2% Cloths 1 Application(s) Topical <User Schedule>  clopidogrel Tablet 75 milliGRAM(s) Oral daily  dextrose 50% Injectable 25 Gram(s) IV Push once  dextrose 50% Injectable 12.5 Gram(s) IV Push once  dextrose 50% Injectable 25 Gram(s) IV Push once  dextrose Oral Gel 15 Gram(s) Oral once  finasteride 5 milliGRAM(s) Oral daily  glucagon  Injectable 1 milliGRAM(s) IntraMuscular once  influenza   Vaccine 0.5 milliLiter(s) IntraMuscular once  insulin glargine Injectable (LANTUS) 18 Unit(s) SubCutaneous at bedtime  insulin lispro (ADMELOG) corrective regimen sliding scale   SubCutaneous three times a day before meals  insulin lispro (ADMELOG) corrective regimen sliding scale   SubCutaneous at bedtime  insulin lispro Injectable (ADMELOG) 6 Unit(s) SubCutaneous three times a day before meals  lactated ringers 1000 milliLiter(s) (250 mL/Hr) IV Continuous <Continuous>  piperacillin/tazobactam IVPB.. 3.375 Gram(s) IV Intermittent every 8 hours  potassium chloride   Solution 40 milliEquivalent(s) Oral once  tamsulosin 0.8 milliGRAM(s) Oral at bedtime  vancomycin  IVPB 1000 milliGRAM(s) IV Intermittent every 12 hours    MEDICATIONS  (PRN):  acetaminophen     Tablet .. 650 milliGRAM(s) Oral every 6 hours PRN Temp greater or equal to 38C (100.4F), Mild Pain (1 - 3)  aluminum hydroxide/magnesium hydroxide/simethicone Suspension 30 milliLiter(s) Oral every 4 hours PRN Dyspepsia  atropine Injectable 1 milliGRAM(s) IV Push once PRN symptomatic bradycardia  melatonin 3 milliGRAM(s) Oral at bedtime PRN Insomnia  ondansetron Injectable 4 milliGRAM(s) IV Push every 8 hours PRN Nausea and/or Vomiting      Allergies    No Known Allergies    Intolerances      Review of Systems:  Constitutional: No fever  Eyes: No blurry vision  Neuro: No tremors  HEENT: No pain  Cardiovascular: No chest pain, palpitations  Respiratory: No SOB, no cough  GI: No nausea, vomiting, abdominal pain  : No dysuria  Skin: no rash  Psych: no depression  Endocrine: no polyuria, polydipsia  Hem/lymph: no swelling  Osteoporosis: no fractures    PHYSICAL EXAM:  VITALS: T(C): 36.7 (12-23-22 @ 12:20)  T(F): 98.1 (12-23-22 @ 12:20), Max: 101 (12-23-22 @ 04:38)  HR: 85 (12-23-22 @ 12:20) (85 - 92)  BP: 172/87 (12-23-22 @ 12:20) (148/89 - 172/87)  RR:  (18 - 18)  SpO2:  (97% - 98%)  Wt(kg): --  GENERAL: NAD  EYES: No proptosis, no lid lag, anicteric  THYROID: Normal size, no palpable nodules  RESPIRATORY: Clear to auscultation bilaterally  CARDIOVASCULAR: Regular rate and rhythm  GI: Soft, nontender, non distended  EXT: b/l feet without wounds; 2+ pulses  PSYCH: Alert and oriented x 3, reactive mood    POCT Blood Glucose.: 215 mg/dL (12-23-22 @ 11:58)  POCT Blood Glucose.: 204 mg/dL (12-23-22 @ 07:57)  POCT Blood Glucose.: 216 mg/dL (12-22-22 @ 21:16)  POCT Blood Glucose.: 292 mg/dL (12-22-22 @ 16:44)  POCT Blood Glucose.: 318 mg/dL (12-22-22 @ 12:12)  POCT Blood Glucose.: 303 mg/dL (12-22-22 @ 08:06)  POCT Blood Glucose.: 153 mg/dL (12-21-22 @ 21:46)  POCT Blood Glucose.: 174 mg/dL (12-21-22 @ 19:34)  POCT Blood Glucose.: 187 mg/dL (12-21-22 @ 12:05)  POCT Blood Glucose.: 182 mg/dL (12-21-22 @ 07:59)  POCT Blood Glucose.: 289 mg/dL (12-20-22 @ 22:20)  POCT Blood Glucose.: 248 mg/dL (12-20-22 @ 18:17)                            12.2   9.87  )-----------( 124      ( 23 Dec 2022 06:41 )             38.6       12-23    140  |  106  |  21  ----------------------------<  245<H>  3.4<L>   |  23  |  1.24    eGFR: 69    Ca    8.6      12-23  Mg     1.9     12-23  Phos  2.4     12-23    TPro  6.1  /  Alb  3.4  /  TBili  0.8  /  DBili  x   /  AST  21  /  ALT  13  /  AlkPhos  56  12-23      Thyroid Function Tests:  12-19 @ 23:47 TSH 0.60 FreeT4 -- T3 -- Anti TPO -- Anti Thyroglobulin Ab -- TSI --      A1C with Estimated Average Glucose Result: 6.3 % (12-20-22 @ 08:16)      12-20 Chol 190 Direct LDL -- LDL calculated 110<H> HDL 53 Trig 132    Radiology:

## 2022-12-23 NOTE — PROGRESS NOTE ADULT - ASSESSMENT
58yo male with PMH of prior CVA with mild left sided deficits, HTN, DM, vertigo and HLD  presents with Mobitz II 3:1 AVB, symptomatic. TTE nml  Secondary workup most remarkable for CT mediastinal and supraclavicular LN, raising concern for Sarcoidosis    1. Mobitz II AV Block  2. R/o Sarcoidosis    - s/p ICD implant (Medtronic) 12/21/22  - CXR result noted, no ptx with leads in place  - Post op fever noted unknown etiology, sepsis protocol initiated, f/u blood cultures  - Monitor ICD wound site for bleeding, hematoma, redness, or drainge  - Need outpatient PETScan r/o Sarcoidosis (ordered in [employer]), will schedule as outpatient  - Follow up with [facility] on 1/6/22 @ 2:40pm  - Continue telemetry monitoring  - Keep K 4.0, Mg 2.0, supplement as needed    #[id number] 56yo male with PMH of prior CVA with mild left sided deficits, HTN, DM, vertigo and HLD presents with Mobitz II 3:1 AVB, symptomatic. TTE nml Secondary workup most remarkable for CT mediastinal and supraclavicular LN, raising concern for Sarcoidosis    1. Mobitz II AV Block  2. R/o Sarcoidosis    - s/p ICD implant (Medtronic) 12/21/22  - CXR result noted, no ptx with leads in place  - Post op fever noted unknown etiology, sepsis protocol initiated, f/u blood cultures  - Monitor ICD wound site for bleeding, hematoma, redness, or drainage  - Need outpatient PET Scan r/o Sarcoidosis (ordered in [employer]), will schedule as outpatient  - Follow up with [facility] on 1/6/22 @ 2:40pm  - Continue telemetry monitoring  - Keep K 4.0, Mg 2.0, supplement as needed    #[phone]

## 2022-12-23 NOTE — PROGRESS NOTE ADULT - PROBLEM SELECTOR PLAN 3
-LAD seen on CT noncontrast   -pulm c/s, possible biopsy through ebus?  -rheum c/s: outpt f/u w/ biopsy result and lab work -LAD seen on CT noncontrast   -pulm c/s, possible biopsy through ebus?  -rheum c/s: outpt f/u w/ biopsy result and lab work  -outpatient PETScan r/o Sarcoidosis (ordered in [employer]), will schedule as outpatient -LAD seen on CT noncontrast   -pulm c/s, possible biopsy through ebus?  -rheum c/s: outpt f/u w/ biopsy result and lab work  -outpatient PETScan r/o Sarcoidosis (ordered in [employer]), will schedule as outpatient  -follicular thyroid tissue seen in biopsy sample  -will require repeat biopsy of supraclavicular, scheduled for 12/27  -NPO at midnight 12/27 and hold AC morning of  -if pt leaves before then, will require pcp to write a script for IR biopsy, insurance authorization, full set of labs before scheduling procedure (call [phone] to make appt) -LAD seen on CT noncontrast   -pulm c/s, possible biopsy through ebus?  -rheum c/s: outpt f/u w/ biopsy result and lab work  -outpatient PETScan r/o Sarcoidosis (ordered in [employer]), will schedule as outpatient  -follicular thyroid tissue seen in biopsy sample  -will require repeat biopsy of supraclavicular, scheduled for 12/27  -NPO at midnight 12/27 and hold AC morning of  -if pt leaves before then, will require pcp to write a script for IR biopsy, insurance authorization, full set of labs before scheduling procedure (call [phone] to make appt)  -U/s of thyroid

## 2022-12-23 NOTE — CONSULT NOTE ADULT - ASSESSMENT
Patient is a 57 y M with a PMHx of CAD, CVA (2012 with no significant residual weakness), DM, p/w bradycardia. The day prior to admission, pt reports feeling dizzy, off-balance, unable to walk. He also experienced nausea and had some episodes of vomiting. He went to [facility] where he was noted to be bradycardic, was transferred here. Upon presentation, HR in the 30s. EKG concerning for 3:1, Mobitz type II AV block. Pt was given Atropine x3, with HRs remaining in the 40s. Pt denies chest pain, dyspnea, abdominal pain. Pt does not regularly follow up with a cardiologist but had seen one (for the first time), 2 months after being discharged from the ED of an OSH for abdominal pain. Per patient report, stress test was normal.      Pt denies sick contacts and recent travel. (20 Dec 2022 09:50)  Hospital course: Found to be bradycardic to the 30s-40s with EKG showing 3:1 Mobitz type II AV block. CT chest showing incidental mediastinal and supraclavicular lymph nodes (upper tracheal lymph node with calcification that measures 3.1  x 2.9 cm), left supraclavicular lymph node (4.1 x 2.9 cm), subcarinal lymph node (2.3 x 1.9 cm).    12/21 - AICD placed. Pt also with lymph node biopsy    Preliminary pathology reports of L supraclavicular LN positive for follicular thyroid tissue (read over the phone by Dr. Liset Ahn [phone]). Unsure if tissue was sampled near the thyroid or thyroid tissue originates from a source of malignancy/ectopic area. Discussed case with Dr. Ahn as well as radiologist Dr. Morgan Reece. A second biopsy would be beneficial to confirm pathology report.    Pt spiked fevers overnight on 12/22 and was cultured and started on vanco and zosyn    ID is asked to evaluate      A/P  #FEVER  Pt with fevers that started  Patient is a 57 y M with a PMHx of CAD, CVA (2012 with no significant residual weakness), DM, p/w bradycardia. The day prior to admission, pt reports feeling dizzy, off-balance, unable to walk. He also experienced nausea and had some episodes of vomiting. He went to [facility] where he was noted to be bradycardic, was transferred here. Upon presentation, HR in the 30s. EKG concerning for 3:1, Mobitz type II AV block. Pt was given Atropine x3, with HRs remaining in the 40s. Pt denies chest pain, dyspnea, abdominal pain. Pt does not regularly follow up with a cardiologist but had seen one (for the first time), 2 months after being discharged from the ED of an OSH for abdominal pain. Per patient report, stress test was normal.      Pt denies sick contacts and recent travel. (20 Dec 2022 09:50)  Hospital course: Found to be bradycardic to the 30s-40s with EKG showing 3:1 Mobitz type II AV block. CT chest showing incidental mediastinal and supraclavicular lymph nodes (upper tracheal lymph node with calcification that measures 3.1  x 2.9 cm), left supraclavicular lymph node (4.1 x 2.9 cm), subcarinal lymph node (2.3 x 1.9 cm).    12/21 - AICD placed. Pt also with lymph node biopsy    Preliminary pathology reports of L supraclavicular LN positive for follicular thyroid tissue (read over the phone by Dr. Liset Ahn [phone]). Unsure if tissue was sampled near the thyroid or thyroid tissue originates from a source of malignancy/ectopic area. Discussed case with Dr. Ahn as well as radiologist Dr. Morgan Reece. A second biopsy would be beneficial to confirm pathology report.    Pt spiked fevers overnight on 12/22 and was cultured and started on vanco and zosyn    ID is asked to evaluate      A/P  #FEVER  Pt with fevers that started yesterday  pt denies Gi , or URI sxs  IV site clean   pt with erythema over the AICD site, no purulence or swelling   Biopsy site in same vicinity but no definite swelling or discharge over that specifically   Pt cultured  and started on abs  would change the zosyn to cefepime and continue the vancomycin . the combination of vancomycin and zosyn can be nephrotoxic and pt alraady with renal insufficiency  nasal MRSA swab negative-if cultures negative will streamline abs  follow vancomycin level  discussed with EPS and they are following the wound  Perhaps irritation from prep but concerning with fever  If fevers persist then check Ct of chest to [facility] for collection /pneumonia/bleed  follow H/H - slight drop  follow the wound closely of left chest     #INGRID  pt s/p biopsy  preliminary biopsy noted  for excisional biopsy  check LDH  QuantiFeron pending       Marcela Ramos M.D. ,   please reach via teams   If no answer, or after 5PM/ weekends,  then please call  [phone]    Assessment and plan discussed with Dr Warren, Dr Burks and DR Foote ( EPS)     ID service will be covering over the 3 day weekend. Please call for acute issues or questions. [phone]

## 2022-12-23 NOTE — CHART NOTE - NSCHARTNOTEFT_GEN_A_CORE
57 y M with a PMHx of CAD, CVA (2012 with no significant residual weakness), DM, p/w bradycardia. EKG concerning for 3:1, Mobitz type II AV block. CT chest: Mediastinal and supraclavicular lymph nodes. ICD placed on 12/21.. However pt spiked fever during hospital stay and will keep pt for sepsis workup. Receiving vancomycin and zosyn.     Preliminary pathology reports of L supraclavicular LN positive for follicular thyroid tissue (read over the phone by Dr. Liset Ahn [phone]). Unsure if tissue was sampled near the thyroid or thyroid tissue originates from a source of malignancy/ectopic area. Discussed case with Dr. Ahn as well as radiologist Dr. Morgan Reece. A second biopsy would be beneficial to confirm pathology report. Spoke to patient and the family, stated it would be best if he did stay during this hospitalization for IR-guided biopsy on Tuesday (inpatient with Dr. Morgan Reece at 4pm). States he has enlarged thyroid and had prior workup outpatient (but unable to specify), states his PCP has more information. Attempted to call Dr. Chris Mason (PCP office at [phone]) but he was not available today. Discussed case with EP physician (Dr. Foote), recommend close outpatient follow up once stable, will continue to follow. Aware of preliminary biopsy results.      -F/U with Thyroid U/S to rule out nodules   -C/w Zosyn and Vancomycin  -Monitor fever curve, Tylenol as needed   -Highly recommend outpatient PET/CT scan   -If patient is stable and wants to be discharged, please ensure patient needs to get script from outpatient provider for second biopsy of LN. If patient is not stable for DC, would highly recommend patient gets the IR-guided biopsy in this hospitalization. Discussed with patient/wife. Will follow up closely. Discussed case with resident.   -Close outpt f/u w/ cards and rheum 57 y M with a PMHx of CAD, CVA (2012 with no significant residual weakness), DM, p/w bradycardia. EKG concerning for 3:1, Mobitz type II AV block. CT chest: Mediastinal and supraclavicular lymph nodes. ICD placed on 12/21.. However pt spiked fever during hospital stay and will keep pt for sepsis workup. Receiving vancomycin and zosyn.     Preliminary pathology reports of L supraclavicular LN positive for follicular thyroid tissue (read over the phone by Dr. Liset Ahn [phone]). Unsure if tissue was sampled near the thyroid or thyroid tissue originates from a source of malignancy/ectopic area. Discussed case with Dr. Ahn as well as radiologist Dr. Morgan Reece. A second biopsy would be beneficial to confirm pathology report. Spoke to patient and the family, stated it would be best if he did stay during this hospitalization for IR-guided biopsy on Tuesday (inpatient with Dr. Morgan Reece at 4pm). States he has enlarged thyroid and had prior workup outpatient (but unable to specify), states his PCP has more information. Attempted to call Dr. Chris Mason (PCP office at [phone]) but he was not available today. Discussed case with EP physician (Dr. Foote), recommend close outpatient follow up once stable, will continue to follow. Aware of preliminary biopsy results.      -F/U with Thyroid U/S to rule out nodules   -C/w Zosyn and Vancomycin  -Monitor fever curve, Tylenol as needed   -Highly recommend outpatient PET/CT scan   -If patient is stable and wants to be discharged, please ensure patient needs to get script from outpatient provider for second biopsy of LN. If patient is not stable for DC, would highly recommend patient gets the IR-guided biopsy in this hospitalization. Discussed with patient/wife. Will follow up closely. Discussed case with resident.   -Close outpt f/u w/ cards and rheum  -ID following

## 2022-12-23 NOTE — CONSULT NOTE ADULT - ASSESSMENT
Patient is a 57 y M with a PMHx of CAD, CVA (2012 with no significant residual weakness), DM, p/w bradycardia    #Uncontrolled T2DM, c/b CVA  A1c 6.3% Goal < 7%  Inpatient FS goal 140-180  Home regimen:  Metformin 1g BID, Steglatro 15 mg daily, Glipizide 5 mg daily    Recs:   -Lantus __ units qHS  -Admelog _ qAC. HOLD if NPO  -low dose correctional scale before each meal and low dose correctional scale at bedtime  -consistent carb diet  -FS qAC and qHS    For d/c:   -Metformin 1g BID + Steglatro 15 mg daily  -Can fu with Dr. Medina    #HTN  BP Goal < 130/80  Continue management per primary team    #HLD  LDL goal < 70  Statin if no contraindications    Rose Pelaez MD  Endocrine Fellow  Can be reached via teams.     For all urgent matters, can call answering service at [phone] (weekdays); [phone] (nights/weekends)  Any non-urgent consults or questions can be emailed to [email or web address] or [email or web address]     Patient is a 57 y M with a PMHx of CAD, CVA (2012 with no significant residual weakness), DM, p/w bradycardia    #Uncontrolled T2DM, c/b CVA  A1c 6.3% Goal < 7%  Inpatient FS goal 140-180  Home regimen:  Metformin 1g BID, Steglatro 15 mg daily, Glipizide 5 mg daily    Recs:   -Lantus 24 units qHS  -Admelog 8 qAC. HOLD if NPO  -low dose correctional scale before each meal and low dose correctional scale at bedtime  -consistent carb diet  -FS qAC and qHS    For d/c:   -Metformin 1g BID + Steglatro 15 mg daily  -Stop Glipizide 5 mg daily  -Can fu with Dr. Medina    #HTN  BP Goal < 130/80  Continue management per primary team    #HLD  LDL goal < 70  Statin if no contraindications    Rose Pelaez MD  Endocrine Fellow  Can be reached via teams.     For all urgent matters, can call answering service at [phone] (weekdays); [phone] (nights/weekends)  Any non-urgent consults or questions can be emailed to [email or web address] or [email or web address]

## 2022-12-23 NOTE — PROGRESS NOTE ADULT - PROBLEM SELECTOR PLAN 1
- febrile to 103, new clinical finding  - full set of labs ordered and vbg w/ lactate  - UA urine culture blood culture  - will initiate ABX after cultures drawn (spiked through cefazolin), will do vanc  - MRSA swab - febrile to 103, new clinical finding  - full set of labs ordered and vbg w/ lactate  - UA urine culture blood culture  - will initiate ABX after cultures drawn (spiked through cefazolin), will do vanc and zosyn  - MRSA swab - febrile to 103, new clinical finding  - full set of labs ordered and vbg w/ lactate  - UA urine culture blood culture  - will initiate ABX after cultures drawn (spiked through cefazolin), will do vanc and zosyn  - vanc (12/22-?) + zosyn (12/22)  - cefepime (12/23-?)  - MRSA swab

## 2022-12-23 NOTE — PROGRESS NOTE ADULT - SUBJECTIVE AND OBJECTIVE BOX
INTERVAL HPI/OVERNIGHT EVENTS:  Pt seen and examined at bedside. No acute overnight events or complaints.    VITAL SIGNS:  T(F): 101 (22 @ 04:38)  HR: 92 (22 @ 04:38)  BP: 148/89 (22 @ 04:38)  RR: 18 (22 @ 04:38)  SpO2: 98% (22 @ 04:38)  Wt(kg): --    PHYSICAL EXAM:    Constitutional: WDWN, NAD  HEENT: PERRL, EOMI, sclera non-icteric, neck supple, trachea midline, no masses, no JVD, MMM, good dentition  Respiratory: CTA b/l, good air entry b/l, no wheezing, no rhonchi, no rales, without accessory muscle use and no intercostal retractions  Cardiovascular: RRR, normal S1S2, no M/R/G  Gastrointestinal: soft, NTND, no masses palpable, BS normal  Extremities: Warm, well perfused, pulses equal bilateral upper and lower extremities, no edema, no clubbing. Capillary refill <2 sec  Neurological: AAOx3, CN Grossly intact  Skin: Normal temperature, warm, dry    MEDICATIONS  (STANDING):  amLODIPine   Tablet 10 milliGRAM(s) Oral daily  aspirin enteric coated 81 milliGRAM(s) Oral daily  atorvastatin 80 milliGRAM(s) Oral at bedtime  chlorhexidine 2% Cloths 1 Application(s) Topical <User Schedule>  clopidogrel Tablet 75 milliGRAM(s) Oral daily  dextrose 50% Injectable 25 Gram(s) IV Push once  dextrose 50% Injectable 12.5 Gram(s) IV Push once  dextrose 50% Injectable 25 Gram(s) IV Push once  dextrose Oral Gel 15 Gram(s) Oral once  finasteride 5 milliGRAM(s) Oral daily  glucagon  Injectable 1 milliGRAM(s) IntraMuscular once  influenza   Vaccine 0.5 milliLiter(s) IntraMuscular once  insulin glargine Injectable (LANTUS) 18 Unit(s) SubCutaneous at bedtime  insulin lispro (ADMELOG) corrective regimen sliding scale   SubCutaneous three times a day before meals  insulin lispro (ADMELOG) corrective regimen sliding scale   SubCutaneous at bedtime  insulin lispro Injectable (ADMELOG) 6 Unit(s) SubCutaneous three times a day before meals  lactated ringers 1000 milliLiter(s) (250 mL/Hr) IV Continuous <Continuous>  piperacillin/tazobactam IVPB.. 3.375 Gram(s) IV Intermittent every 8 hours  potassium chloride   Solution 40 milliEquivalent(s) Oral once  tamsulosin 0.8 milliGRAM(s) Oral at bedtime  vancomycin  IVPB 1000 milliGRAM(s) IV Intermittent every 12 hours    MEDICATIONS  (PRN):  acetaminophen     Tablet .. 650 milliGRAM(s) Oral every 6 hours PRN Temp greater or equal to 38C (100.4F), Mild Pain (1 - 3)  aluminum hydroxide/magnesium hydroxide/simethicone Suspension 30 milliLiter(s) Oral every 4 hours PRN Dyspepsia  atropine Injectable 1 milliGRAM(s) IV Push once PRN symptomatic bradycardia  melatonin 3 milliGRAM(s) Oral at bedtime PRN Insomnia  ondansetron Injectable 4 milliGRAM(s) IV Push every 8 hours PRN Nausea and/or Vomiting      Allergies    No Known Allergies    Intolerances        LABS:                        13.9   11.11 )-----------( 124      ( 22 Dec 2022 13:33 )             43.0     12    140  |  104  |  24<H>  ----------------------------<  362<H>  3.5   |  21<L>  |  1.39<H>    Ca    8.8      22 Dec 2022 18:18  Phos  2.4     12  Mg     2.0         TPro  6.6  /  Alb  4.1  /  TBili  0.6  /  DBili  x   /  AST  15  /  ALT  14  /  AlkPhos  61  12-    PT/INR - ( 21 Dec 2022 06:58 )   PT: 12.9 sec;   INR: 1.11 ratio         PTT - ( 21 Dec 2022 06:58 )  PTT:28.4 sec  Urinalysis Basic - ( 22 Dec 2022 15:29 )    Color: Light Yellow / Appearance: Clear / S.026 / pH: x  Gluc: x / Ketone: Negative  / Bili: Negative / Urobili: Negative   Blood: x / Protein: Trace / Nitrite: Negative   Leuk Esterase: Negative / RBC: 0 /hpf / WBC 0 /HPF   Sq Epi: x / Non Sq Epi: 0 /hpf / Bacteria: Negative        RADIOLOGY & ADDITIONAL TESTS:  Reviewed      ******************  Authored By: Mayra Burks MD PGY1  Internal Medicine  Pager: [phone] [facility]// [id number] [facility]  MS Teams Preferred  ******************   INTERVAL HPI/OVERNIGHT EVENTS:  Pt seen and examined at bedside. No acute complaints. Pt spiked fever overnight, currently on vancomycin and zosyn.     VITAL SIGNS:  T(F): 101 (22 @ 04:38)  HR: 92 (22 @ 04:38)  BP: 148/89 (22 @ 04:38)  RR: 18 (22 @ 04:38)  SpO2: 98% (22 @ 04:38)  Wt(kg): --    PHYSICAL EXAM:    CONSTITUTIONAL: NAD, well-developed  HEENT: normal conjunctiva, PEERLA  RESPIRATORY: Normal respiratory effort; lungs are clear to auscultation bilaterally  CARDIOVASCULAR: V-paced on TELE, ICD site tender to palpation  ABDOMEN: No tenderness to palpation at all four quadrants, +BS  MUSCULOSKELETAL no clubbing or cyanosis of digits; no joint swelling or tenderness to palpation  EXTREMITIES No lower extremity edema; Peripheral pulses are 2+ bilaterally  SKIN: well-perfused, no dry skin    MEDICATIONS  (STANDING):  amLODIPine   Tablet 10 milliGRAM(s) Oral daily  aspirin enteric coated 81 milliGRAM(s) Oral daily  atorvastatin 80 milliGRAM(s) Oral at bedtime  chlorhexidine 2% Cloths 1 Application(s) Topical <User Schedule>  clopidogrel Tablet 75 milliGRAM(s) Oral daily  dextrose 50% Injectable 25 Gram(s) IV Push once  dextrose 50% Injectable 12.5 Gram(s) IV Push once  dextrose 50% Injectable 25 Gram(s) IV Push once  dextrose Oral Gel 15 Gram(s) Oral once  finasteride 5 milliGRAM(s) Oral daily  glucagon  Injectable 1 milliGRAM(s) IntraMuscular once  influenza   Vaccine 0.5 milliLiter(s) IntraMuscular once  insulin glargine Injectable (LANTUS) 18 Unit(s) SubCutaneous at bedtime  insulin lispro (ADMELOG) corrective regimen sliding scale   SubCutaneous three times a day before meals  insulin lispro (ADMELOG) corrective regimen sliding scale   SubCutaneous at bedtime  insulin lispro Injectable (ADMELOG) 6 Unit(s) SubCutaneous three times a day before meals  lactated ringers 1000 milliLiter(s) (250 mL/Hr) IV Continuous <Continuous>  piperacillin/tazobactam IVPB.. 3.375 Gram(s) IV Intermittent every 8 hours  potassium chloride   Solution 40 milliEquivalent(s) Oral once  tamsulosin 0.8 milliGRAM(s) Oral at bedtime  vancomycin  IVPB 1000 milliGRAM(s) IV Intermittent every 12 hours    MEDICATIONS  (PRN):  acetaminophen     Tablet .. 650 milliGRAM(s) Oral every 6 hours PRN Temp greater or equal to 38C (100.4F), Mild Pain (1 - 3)  aluminum hydroxide/magnesium hydroxide/simethicone Suspension 30 milliLiter(s) Oral every 4 hours PRN Dyspepsia  atropine Injectable 1 milliGRAM(s) IV Push once PRN symptomatic bradycardia  melatonin 3 milliGRAM(s) Oral at bedtime PRN Insomnia  ondansetron Injectable 4 milliGRAM(s) IV Push every 8 hours PRN Nausea and/or Vomiting      Allergies    No Known Allergies    Intolerances        LABS:                        13.9   11.11 )-----------( 124      ( 22 Dec 2022 13:33 )             43.0         140  |  104  |  24<H>  ----------------------------<  362<H>  3.5   |  21<L>  |  1.39<H>    Ca    8.8      22 Dec 2022 18:18  Phos  2.4       Mg     2.0         TPro  6.6  /  Alb  4.1  /  TBili  0.6  /  DBili  x   /  AST  15  /  ALT  14  /  AlkPhos  61      PT/INR - ( 21 Dec 2022 06:58 )   PT: 12.9 sec;   INR: 1.11 ratio         PTT - ( 21 Dec 2022 06:58 )  PTT:28.4 sec  Urinalysis Basic - ( 22 Dec 2022 15:29 )    Color: Light Yellow / Appearance: Clear / S.026 / pH: x  Gluc: x / Ketone: Negative  / Bili: Negative / Urobili: Negative   Blood: x / Protein: Trace / Nitrite: Negative   Leuk Esterase: Negative / RBC: 0 /hpf / WBC 0 /HPF   Sq Epi: x / Non Sq Epi: 0 /hpf / Bacteria: Negative        RADIOLOGY & ADDITIONAL TESTS:  Reviewed      ******************  Authored By: Mayra Burks MD PGY1  Internal Medicine  Pager: [phone] [facility]// [id number] [facility]  MS Teams Preferred  ******************

## 2022-12-23 NOTE — CONSULT NOTE ADULT - SUBJECTIVE AND OBJECTIVE BOX
Patient is a 57y old  Male who presents with a chief complaint of bradycardia (23 Dec 2022 08:25)      HPI:  Patient is a 57 y M with a PMHx of CAD, CVA ( with no significant residual weakness), DM, p/w bradycardia. The day prior to admission, pt reports feeling dizzy, off-balance, unable to walk. He also experienced nausea and had some episodes of vomiting. He went to [facility] where he was noted to be bradycardic, was transferred here. Upon presentation, HR in the 30s. EKG concerning for 3:1, Mobitz type II AV block. Pt was given Atropine x3, with HRs remaining in the 40s. Pt denies chest pain, dyspnea, abdominal pain. Pt does not regularly follow up with a cardiologist but had seen one (for the first time), 2 months after being discharged from the ED of an OSH for abdominal pain. Per patient report, stress test was normal.      Pt denies sick contacts and recent travel. 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[employer]   Lives with: wife    Substance Use : denies  Tobacco Usage:  (  x ) never smoked   (   ) former smoker   (   ) current smoker  (     ) pack year  (        ) last tobacco use date  Alcohol Usage: denies  Travel:from [place]  Pets: no           FAMILY HISTORY:  mother- - DM  father- - smoker- lung cancer  brother with diarrhea    REVIEW OF SYSTEMS  General:	fevers, chills  Skin:No rash  	  Ophthalmologic:Denies any visual complaints,discharge redness or photophobia  	  ENMT:No nasal discharge,headache,sinus congestion or throat pain.No dental complaints    Respiratory and Thorax:No cough,sputum or chest pain.Denies shortness of breath  	  Cardiovascular:	No chest pain,palpitaions or dizziness    Gastrointestinal:	NO nausea,abdominal pain or diarrhea.    Genitourinary:	No dysuria,frequency. No flank pain    Musculoskeletal:	No joint swelling or pain.No weakness    Neurological:No confusion,diziness.No extremity weakness.No bladder or bowel incontinence	    Psychiatric:No delusions or hallucinations	    Hematology/Lymphatics:	No LN swelling.No gum bleeding     Endocrine:	No recent weight gain or loss.No abnormal heat/cold intolerance    Allergic/Immunologic:	No hives or rash     Allergies    No Known Allergies    Intolerances        Antimicrobials:       MEDICATIONS  (prior antimicrobials ):  ceFAZolin   IVPB   100 mL/Hr IV Intermittent (22 @ 10:29)   100 mL/Hr IV Intermittent (22 @ 02:40)    - present    vancomycin  IVPB  - > present           piperacillin/tazobactam IVPB.. 3.375 Gram(s) IV Intermittent every 8 hours  vancomycin  IVPB 1000 milliGRAM(s) IV Intermittent every 12 hours      MEDICATIONS  (STANDING):  amLODIPine   Tablet 10 milliGRAM(s) Oral daily  aspirin enteric coated 81 milliGRAM(s) Oral daily  atorvastatin 80 milliGRAM(s) Oral at bedtime  chlorhexidine 2% Cloths 1 Application(s) Topical <User Schedule>  clopidogrel Tablet 75 milliGRAM(s) Oral daily  dextrose 50% Injectable 25 Gram(s) IV Push once  dextrose 50% Injectable 12.5 Gram(s) IV Push once  dextrose 50% Injectable 25 Gram(s) IV Push once  dextrose Oral Gel 15 Gram(s) Oral once  finasteride 5 milliGRAM(s) Oral daily  glucagon  Injectable 1 milliGRAM(s) IntraMuscular once  influenza   Vaccine 0.5 milliLiter(s) IntraMuscular once  insulin glargine Injectable (LANTUS) 18 Unit(s) SubCutaneous at bedtime  insulin lispro (ADMELOG) corrective regimen sliding scale   SubCutaneous three times a day before meals  insulin lispro (ADMELOG) corrective regimen sliding scale   SubCutaneous at bedtime  insulin lispro Injectable (ADMELOG) 6 Unit(s) SubCutaneous three times a day before meals  lactated ringers 1000 milliLiter(s) (250 mL/Hr) IV Continuous <Continuous>  piperacillin/tazobactam IVPB.. 3.375 Gram(s) IV Intermittent every 8 hours  potassium chloride   Solution 40 milliEquivalent(s) Oral once  tamsulosin 0.8 milliGRAM(s) Oral at bedtime  vancomycin  IVPB 1000 milliGRAM(s) IV Intermittent every 12 hours    MEDICATIONS  (PRN):  acetaminophen     Tablet .. 650 milliGRAM(s) Oral every 6 hours PRN Temp greater or equal to 38C (100.4F), Mild Pain (1 - 3)  aluminum hydroxide/magnesium hydroxide/simethicone Suspension 30 milliLiter(s) Oral every 4 hours PRN Dyspepsia  atropine Injectable 1 milliGRAM(s) IV Push once PRN symptomatic bradycardia  melatonin 3 milliGRAM(s) Oral at bedtime PRN Insomnia  ondansetron Injectable 4 milliGRAM(s) IV Push every 8 hours PRN Nausea and/or Vomiting        Vital Signs Last 24 Hrs  T(C): 36.7 (23 Dec 2022 12:20), Max: 38.3 (23 Dec 2022 04:38)  T(F): 98.1 (23 Dec 2022 12:20), Max: 101 (23 Dec 2022 04:38)  HR: 85 (23 Dec 2022 12:20) (85 - 92)  BP: 172/87 (23 Dec 2022 12:20) (148/89 - 172/87)  BP(mean): --  RR: 18 (23 Dec 2022 12:20) (18 - 18)  SpO2: 97% (23 Dec 2022 12:20) (97% - 98%)    Parameters below as of 23 Dec 2022 12:20  Patient On (Oxygen Delivery Method): room air        PHYSICAL EXAM:Pleasant patient in no acute distress.      Constitutional:Comfortable.Awake and alert  No cachexia     Eyes:PERRL EOMI.NO discharge or conjunctival injection    ENMT:No sinus tenderness.No thrush.No pharyngeal exudate or erythema.Fair dental hygiene    Neck:Supple,No LN,no JVD      Respiratory:Good air entry bilaterally,CTA    Cardiovascular:S1 S2 wnl,     AICD site with surrounding erythema to axilla     Gastrointestinal:Soft BS(+) no tenderness       Extremities:No cyanosis,clubbing or edema.    Neurological:AAO X 3,No grossly focal deficits    Skin:No rash     Lymph Nodes:No palpable LNs    Musculoskeletal:No joint swelling or LOM    Psychiatric:Affect normal.                                12.2   9.87  )-----------( 124      ( 23 Dec 2022 06:41 )             38.6     LIVER FUNCTIONS - ( 23 Dec 2022 06:41 )  Alb: 3.4 g/dL / Pro: 6.1 g/dL / ALK PHOS: 56 U/L / ALT: 13 U/L / AST: 21 U/L / GGT: x             12-    140  |  106  |  21  ----------------------------<  245<H>  3.4<L>   |  23  |  1.24    Ca    8.6      23 Dec 2022 06:41  Phos  2.4       Mg     1.9         TPro  6.1  /  Alb  3.4  /  TBili  0.8  /  DBili  x   /  AST  21  /  ALT  13  /  AlkPhos  56            Urinalysis Basic - ( 22 Dec 2022 15:29 )    Color: Light Yellow / Appearance: Clear / S.026 / pH: x  Gluc: x / Ketone: Negative  / Bili: Negative / Urobili: Negative   Blood: x / Protein: Trace / Nitrite: Negative   Leuk Esterase: Negative / RBC: 0 /hpf / WBC 0 /HPF   Sq Epi: x / Non Sq Epi: 0 /hpf / Bacteria: Negative        RECENT CULTURES:      MICROBIOLOGY:    Respiratory Viral Panel with COVID-19 by FELIPA (22 @ 14:02)    Rapid RVP Result: [facility]    SARS-CoV-2: [facility]: This Respiratory Panel uses polymerase chain reaction (PCR) to detect for  adenovirus; coronavirus (HKU1, NL63, 229E, OC43); human metapneumovirus  (hMPV); human enterovirus/rhinovirus (Entero/RV); influenza A; influenza  A/H1; influenza A/H3; influenza A/H1-2009; influenza B; parainfluenza  viruses 1, 2, 3, 4; respiratory syncytial virus; Mycoplasma pneumoniae;  Chlamydophila pneumoniae; and SARS-CoV-2.          Radiology:    < from: CT Head No Cont (22 @ 16:19) >  IMPRESSION:  No evidence of intracranial hemorrhage. No change 2022    < end of copied text >        · Procedure Name	Left supraclavicular Lymph Node Biopsy      < from: CT Chest No Cont (22 @ 17:04) >    ACC: [id number] EXAM:  CT CHEST                          PROCEDURE DATE:  2022          INTERPRETATION:  CLINICAL INFORMATION: Heart block, syncope and   dizziness. Evaluate for sarcoidosis.    COMPARISON: Chest radiograph 2022.    CONTRAST/COMPLICATIONS:  IV Contrast: NONE  Oral Contrast: NONE  Complications: None reported at time of study completion    PROCEDURE:  CT scan of the chest was obtained without intravenous contrast.    FINDINGS:    LYMPH NODES: Mediastinal and supraclavicular lymph nodes, for reference   there is a upper tracheal lymph node with calcification that measures 3.1   x 2.9 cm (series 2 image 37), a left supraclavicular lymph node that   measures 4.1 x 2.9 cm (series 5 image 39) and a subcarinal lymph node   that measures 2.3 x 1.9 cm (series 5 image 54).    HEART/VASCULATURE: Heart size is within normal limits. No pericardial   effusion. Normal caliber aorta. Calcifications of the coronary arteries   and aorta.    AIRWAYS/LUNGS/PLEURA: The central airways are patent. There are   peripheral curvilinear opacities in the bilateral lower lobes and upper   lobes. The remainder of the lungs are clear. No pleural effusion or   pneumothorax.    UPPER ABDOMEN: Left kidney cyst.    BONES/SOFT TISSUES: Degenerative changes of the spine. No aggressive   osseous lesion.    IMPRESSION:    1.  Mediastinal and supraclavicular lymph nodes, as described above.    --- End of Report ---           EMERSON DENIS MD; Resident Radiologist  This document has been electronically signed.  DAGOBERTO FIGUEROA MD; Attending Radiologist  This document has been electronically signed. Dec 21 2022  9:27AM    < end of copied text >       Patient is a 57y old  Male who presents with a chief complaint of bradycardia (23 Dec 2022 08:25)      HPI:  Patient is a 57 y M with a PMHx of CAD, CVA ( with no significant residual weakness), DM, p/w bradycardia. The day prior to admission, pt reports feeling dizzy, off-balance, unable to walk. He also experienced nausea and had some episodes of vomiting. He went to [facility] where he was noted to be bradycardic, was transferred here. Upon presentation, HR in the 30s. EKG concerning for 3:1, Mobitz type II AV block. Pt was given Atropine x3, with HRs remaining in the 40s. Pt denies chest pain, dyspnea, abdominal pain. Pt does not regularly follow up with a cardiologist but had seen one (for the first time), 2 months after being discharged from the ED of an OSH for abdominal pain. Per patient report, stress test was normal.      Pt denies sick contacts and recent travel. (20 Dec 2022 09:50)  Hospital course: Found to be bradycardic to the 30s-40s with EKG showing 3:1 Mobitz type II AV block. CT chest showing incidental mediastinal and supraclavicular lymph nodes (upper tracheal lymph node with calcification that measures 3.1  x 2.9 cm), left supraclavicular lymph node (4.1 x 2.9 cm), subcarinal lymph node (2.3 x 1.9 cm).     - AICD placed. Pt also with lymph node biopsy    Preliminary pathology reports of L supraclavicular LN positive for follicular thyroid tissue (read over the phone by Dr. Liset Ahn [phone]). Unsure if tissue was sampled near the thyroid or thyroid tissue originates from a source of malignancy/ectopic area. Discussed case with Dr. Ahn as well as radiologist Dr. Morgan Reece. A second biopsy would be beneficial to confirm pathology report.    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[employer]   Lives with: wife    Substance Use : denies  Tobacco Usage:  (  x ) never smoked   (   ) former smoker   (   ) current smoker  (     ) pack year  (        ) last tobacco use date  Alcohol Usage: denies  Travel:from [place]  Pets: no           FAMILY HISTORY:  mother- - DM  father- - smoker- lung cancer  brother with diarrhea    REVIEW OF SYSTEMS  General:	fevers, chills  Skin:No rash  	  Ophthalmologic:Denies any visual complaints,discharge redness or photophobia  	  ENMT:No nasal discharge,headache,sinus congestion or throat pain.No dental complaints    Respiratory and Thorax:No cough,sputum or chest pain.Denies shortness of breath  	  Cardiovascular:	No chest pain,palpitaions or dizziness    Gastrointestinal:	NO nausea,abdominal pain or diarrhea.    Genitourinary:	No dysuria,frequency. No flank pain    Musculoskeletal:	No joint swelling or pain.No weakness    Neurological:No confusion,diziness.No extremity weakness.No bladder or bowel incontinence	    Psychiatric:No delusions or hallucinations	    Hematology/Lymphatics:	No LN swelling.No gum bleeding     Endocrine:	No recent weight gain or loss.No abnormal heat/cold intolerance    Allergic/Immunologic:	No hives or rash     Allergies    No Known Allergies    Intolerances        Antimicrobials:       MEDICATIONS  (prior antimicrobials ):  ceFAZolin   IVPB   100 mL/Hr IV Intermittent (22 @ 10:29)   100 mL/Hr IV Intermittent (22 @ 02:40)    - present    vancomycin  IVPB  - > present           piperacillin/tazobactam IVPB.. 3.375 Gram(s) IV Intermittent every 8 hours  vancomycin  IVPB 1000 milliGRAM(s) IV Intermittent every 12 hours      MEDICATIONS  (STANDING):  amLODIPine   Tablet 10 milliGRAM(s) Oral daily  aspirin enteric coated 81 milliGRAM(s) Oral daily  atorvastatin 80 milliGRAM(s) Oral at bedtime  chlorhexidine 2% Cloths 1 Application(s) Topical <User Schedule>  clopidogrel Tablet 75 milliGRAM(s) Oral daily  dextrose 50% Injectable 25 Gram(s) IV Push once  dextrose 50% Injectable 12.5 Gram(s) IV Push once  dextrose 50% Injectable 25 Gram(s) IV Push once  dextrose Oral Gel 15 Gram(s) Oral once  finasteride 5 milliGRAM(s) Oral daily  glucagon  Injectable 1 milliGRAM(s) IntraMuscular once  influenza   Vaccine 0.5 milliLiter(s) IntraMuscular once  insulin glargine Injectable (LANTUS) 18 Unit(s) SubCutaneous at bedtime  insulin lispro (ADMELOG) corrective regimen sliding scale   SubCutaneous three times a day before meals  insulin lispro (ADMELOG) corrective regimen sliding scale   SubCutaneous at bedtime  insulin lispro Injectable (ADMELOG) 6 Unit(s) SubCutaneous three times a day before meals  lactated ringers 1000 milliLiter(s) (250 mL/Hr) IV Continuous <Continuous>  piperacillin/tazobactam IVPB.. 3.375 Gram(s) IV Intermittent every 8 hours  potassium chloride   Solution 40 milliEquivalent(s) Oral once  tamsulosin 0.8 milliGRAM(s) Oral at bedtime  vancomycin  IVPB 1000 milliGRAM(s) IV Intermittent every 12 hours    MEDICATIONS  (PRN):  acetaminophen     Tablet .. 650 milliGRAM(s) Oral every 6 hours PRN Temp greater or equal to 38C (100.4F), Mild Pain (1 - 3)  aluminum hydroxide/magnesium hydroxide/simethicone Suspension 30 milliLiter(s) Oral every 4 hours PRN Dyspepsia  atropine Injectable 1 milliGRAM(s) IV Push once PRN symptomatic bradycardia  melatonin 3 milliGRAM(s) Oral at bedtime PRN Insomnia  ondansetron Injectable 4 milliGRAM(s) IV Push every 8 hours PRN Nausea and/or Vomiting        Vital Signs Last 24 Hrs  T(C): 36.7 (23 Dec 2022 12:20), Max: 38.3 (23 Dec 2022 04:38)  T(F): 98.1 (23 Dec 2022 12:20), Max: 101 (23 Dec 2022 04:38)  HR: 85 (23 Dec 2022 12:20) (85 - 92)  BP: 172/87 (23 Dec 2022 12:20) (148/89 - 172/87)  BP(mean): --  RR: 18 (23 Dec 2022 12:20) (18 - 18)  SpO2: 97% (23 Dec 2022 12:20) (97% - 98%)    Parameters below as of 23 Dec 2022 12:20  Patient On (Oxygen Delivery Method): room air        PHYSICAL EXAM:Pleasant patient in no acute distress.      Constitutional:Comfortable.Awake and alert  No cachexia     Eyes:PERRL EOMI.NO discharge or conjunctival injection    ENMT:No sinus tenderness.No thrush.No pharyngeal exudate or erythema.Fair dental hygiene    Neck:Supple,No LN,no JVD      Respiratory:Good air entry bilaterally,CTA    Cardiovascular:S1 S2 wnl,     AICD site with surrounding erythema to axilla     Gastrointestinal:Soft BS(+) no tenderness       Extremities:No cyanosis,clubbing or edema.    Neurological:AAO X 3,No grossly focal deficits    Skin:No rash     Lymph Nodes:No palpable LNs    Musculoskeletal:No joint swelling or LOM    Psychiatric:Affect normal.                                12.2   9.87  )-----------( 124      ( 23 Dec 2022 06:41 )             38.6     LIVER FUNCTIONS - ( 23 Dec 2022 06:41 )  Alb: 3.4 g/dL / Pro: 6.1 g/dL / ALK PHOS: 56 U/L / ALT: 13 U/L / AST: 21 U/L / GGT: x             12-    140  |  106  |  21  ----------------------------<  245<H>  3.4<L>   |  23  |  1.24    Ca    8.6      23 Dec 2022 06:41  Phos  2.4     12-  Mg     1.9     12-    TPro  6.1  /  Alb  3.4  /  TBili  0.8  /  DBili  x   /  AST  21  /  ALT  13  /  AlkPhos  56  12-23          Urinalysis Basic - ( 22 Dec 2022 15:29 )    Color: Light Yellow / Appearance: Clear / S.026 / pH: x  Gluc: x / Ketone: Negative  / Bili: Negative / Urobili: Negative   Blood: x / Protein: Trace / Nitrite: Negative   Leuk Esterase: Negative / RBC: 0 /hpf / WBC 0 /HPF   Sq Epi: x / Non Sq Epi: 0 /hpf / Bacteria: Negative        RECENT CULTURES:      MICROBIOLOGY:    Respiratory Viral Panel with COVID-19 by FELIPA (22 @ 14:02)    Rapid RVP Result: [facility]    SARS-CoV-2: [facility]: This Respiratory Panel uses polymerase chain reaction (PCR) to detect for  adenovirus; coronavirus (HKU1, NL63, 229E, OC43); human metapneumovirus  (hMPV); human enterovirus/rhinovirus (Entero/RV); influenza A; influenza  A/H1; influenza A/H3; influenza A/H1-2009; influenza B; parainfluenza  viruses 1, 2, 3, 4; respiratory syncytial virus; Mycoplasma pneumoniae;  Chlamydophila pneumoniae; and SARS-CoV-2.          Radiology:    < from: CT Head No Cont (22 @ 16:19) >  IMPRESSION:  No evidence of intracranial hemorrhage. No change 2022    < end of copied text >        · Procedure Name	Left supraclavicular Lymph Node Biopsy      < from: CT Chest No Cont (22 @ 17:04) >    ACC: [id number] EXAM:  CT CHEST                          PROCEDURE DATE:  2022          INTERPRETATION:  CLINICAL INFORMATION: Heart block, syncope and   dizziness. Evaluate for sarcoidosis.    COMPARISON: Chest radiograph 2022.    CONTRAST/COMPLICATIONS:  IV Contrast: NONE  Oral Contrast: NONE  Complications: None reported at time of study completion    PROCEDURE:  CT scan of the chest was obtained without intravenous contrast.    FINDINGS:    LYMPH NODES: Mediastinal and supraclavicular lymph nodes, for reference   there is a upper tracheal lymph node with calcification that measures 3.1   x 2.9 cm (series 2 image 37), a left supraclavicular lymph node that   measures 4.1 x 2.9 cm (series 5 image 39) and a subcarinal lymph node   that measures 2.3 x 1.9 cm (series 5 image 54).    HEART/VASCULATURE: Heart size is within normal limits. No pericardial   effusion. Normal caliber aorta. Calcifications of the coronary arteries   and aorta.    AIRWAYS/LUNGS/PLEURA: The central airways are patent. There are   peripheral curvilinear opacities in the bilateral lower lobes and upper   lobes. The remainder of the lungs are clear. No pleural effusion or   pneumothorax.    UPPER ABDOMEN: Left kidney cyst.    BONES/SOFT TISSUES: Degenerative changes of the spine. No aggressive   osseous lesion.    IMPRESSION:    1.  Mediastinal and supraclavicular lymph nodes, as described above.    --- End of Report ---           EMERSON DENIS MD; Resident Radiologist  This document has been electronically signed.  DAGOBERTO FIGUEROA MD; Attending Radiologist  This document has been electronically signed. Dec 21 2022  9:27AM    < end of copied text >       Patient is a 57y old  Male who presents with a chief complaint of bradycardia (23 Dec 2022 08:25)      HPI:  Patient is a 57 y M with a PMHx of CAD, CVA ( with no significant residual weakness), DM, p/w bradycardia. The day prior to admission, pt reports feeling dizzy, off-balance, unable to walk. He also experienced nausea and had some episodes of vomiting. He went to [facility] where he was noted to be bradycardic, was transferred here. Upon presentation, HR in the 30s. EKG concerning for 3:1, Mobitz type II AV block. Pt was given Atropine x3, with HRs remaining in the 40s. Pt denies chest pain, dyspnea, abdominal pain. Pt does not regularly follow up with a cardiologist but had seen one (for the first time), 2 months after being discharged from the ED of an OSH for abdominal pain. Per patient report, stress test was normal.      Pt denies sick contacts and recent travel. (20 Dec 2022 09:50)  Hospital course: Found to be bradycardic to the 30s-40s with EKG showing 3:1 Mobitz type II AV block. CT chest showing incidental mediastinal and supraclavicular lymph nodes (upper tracheal lymph node with calcification that measures 3.1  x 2.9 cm), left supraclavicular lymph node (4.1 x 2.9 cm), subcarinal lymph node (2.3 x 1.9 cm).     - AICD placed. Pt also with lymph node biopsy    Preliminary pathology reports of L supraclavicular LN positive for follicular thyroid tissue (read over the phone by Dr. Liset Ahn [phone]). Unsure if tissue was sampled near the thyroid or thyroid tissue originates from a source of malignancy/ectopic area. Discussed case with Dr. Ahn as well as radiologist Dr. Morgan Reece. A second biopsy would be beneficial to confirm pathology report.    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[employer]   Lives with: wife    Substance Use : denies  Tobacco Usage:  (  x ) never smoked   (   ) former smoker   (   ) current smoker  (     ) pack year  (        ) last tobacco use date  Alcohol Usage: denies  Travel:from [place]  Pets: no           FAMILY HISTORY:  mother- - DM  father- - smoker- lung cancer  brother with diarrhea    REVIEW OF SYSTEMS  General:	fevers, chills  Skin:No rash  	  Ophthalmologic:Denies any visual complaints,discharge redness or photophobia  	  ENMT:No nasal discharge,headache,sinus congestion or throat pain.No dental complaints    Respiratory and Thorax:No cough,sputum or chest pain.Denies shortness of breath  	  Cardiovascular:	No chest pain,palpitaions or dizziness    Gastrointestinal:	NO nausea,abdominal pain or diarrhea.    Genitourinary:	No dysuria,frequency. No flank pain    Musculoskeletal:	No joint swelling or pain.No weakness    Neurological:No confusion,diziness.No extremity weakness.No bladder or bowel incontinence	    Psychiatric:No delusions or hallucinations	    Hematology/Lymphatics:	No LN swelling.No gum bleeding     Endocrine:	No recent weight gain or loss.No abnormal heat/cold intolerance    Allergic/Immunologic:	No hives or rash     Allergies    No Known Allergies    Intolerances        Antimicrobials:       MEDICATIONS  (prior antimicrobials ):  ceFAZolin   IVPB   100 mL/Hr IV Intermittent (22 @ 10:29)   100 mL/Hr IV Intermittent (22 @ 02:40)    - present    vancomycin  IVPB  - > present           piperacillin/tazobactam IVPB.. 3.375 Gram(s) IV Intermittent every 8 hours  vancomycin  IVPB 1000 milliGRAM(s) IV Intermittent every 12 hours      MEDICATIONS  (STANDING):  amLODIPine   Tablet 10 milliGRAM(s) Oral daily  aspirin enteric coated 81 milliGRAM(s) Oral daily  atorvastatin 80 milliGRAM(s) Oral at bedtime  chlorhexidine 2% Cloths 1 Application(s) Topical <User Schedule>  clopidogrel Tablet 75 milliGRAM(s) Oral daily  dextrose 50% Injectable 25 Gram(s) IV Push once  dextrose 50% Injectable 12.5 Gram(s) IV Push once  dextrose 50% Injectable 25 Gram(s) IV Push once  dextrose Oral Gel 15 Gram(s) Oral once  finasteride 5 milliGRAM(s) Oral daily  glucagon  Injectable 1 milliGRAM(s) IntraMuscular once  influenza   Vaccine 0.5 milliLiter(s) IntraMuscular once  insulin glargine Injectable (LANTUS) 18 Unit(s) SubCutaneous at bedtime  insulin lispro (ADMELOG) corrective regimen sliding scale   SubCutaneous three times a day before meals  insulin lispro (ADMELOG) corrective regimen sliding scale   SubCutaneous at bedtime  insulin lispro Injectable (ADMELOG) 6 Unit(s) SubCutaneous three times a day before meals  lactated ringers 1000 milliLiter(s) (250 mL/Hr) IV Continuous <Continuous>  piperacillin/tazobactam IVPB.. 3.375 Gram(s) IV Intermittent every 8 hours  potassium chloride   Solution 40 milliEquivalent(s) Oral once  tamsulosin 0.8 milliGRAM(s) Oral at bedtime  vancomycin  IVPB 1000 milliGRAM(s) IV Intermittent every 12 hours    MEDICATIONS  (PRN):  acetaminophen     Tablet .. 650 milliGRAM(s) Oral every 6 hours PRN Temp greater or equal to 38C (100.4F), Mild Pain (1 - 3)  aluminum hydroxide/magnesium hydroxide/simethicone Suspension 30 milliLiter(s) Oral every 4 hours PRN Dyspepsia  atropine Injectable 1 milliGRAM(s) IV Push once PRN symptomatic bradycardia  melatonin 3 milliGRAM(s) Oral at bedtime PRN Insomnia  ondansetron Injectable 4 milliGRAM(s) IV Push every 8 hours PRN Nausea and/or Vomiting        Vital Signs Last 24 Hrs  T(C): 36.7 (23 Dec 2022 12:20), Max: 38.3 (23 Dec 2022 04:38)  T(F): 98.1 (23 Dec 2022 12:20), Max: 101 (23 Dec 2022 04:38)  HR: 85 (23 Dec 2022 12:20) (85 - 92)  BP: 172/87 (23 Dec 2022 12:20) (148/89 - 172/87)  BP(mean): --  RR: 18 (23 Dec 2022 12:20) (18 - 18)  SpO2: 97% (23 Dec 2022 12:20) (97% - 98%)    Parameters below as of 23 Dec 2022 12:20  Patient On (Oxygen Delivery Method): room air        PHYSICAL EXAM:Pleasant patient in no acute distress.      Constitutional:Comfortable.Awake and alert  No cachexia     Eyes:PERRL EOMI.NO discharge or conjunctival injection    ENMT:No sinus tenderness.No thrush.No pharyngeal exudate or erythema.Fair dental hygiene    Neck:Supple,No LN,no JVD      Respiratory:Good air entry bilaterally,CTA    Cardiovascular:S1 S2 wnl,     AICD site with surrounding subtle  erythema to axilla  no purulence or swelling    Gastrointestinal:Soft BS(+) no tenderness       Extremities:No cyanosis,clubbing or edema.    Neurological:AAO X 3,No grossly focal deficits    Skin:No rash     Lymph Nodes:No palpable LNs    Musculoskeletal:No joint swelling or LOM    Psychiatric:Affect normal.                                12.2   9.87  )-----------( 124      ( 23 Dec 2022 06:41 )             38.6     LIVER FUNCTIONS - ( 23 Dec 2022 06:41 )  Alb: 3.4 g/dL / Pro: 6.1 g/dL / ALK PHOS: 56 U/L / ALT: 13 U/L / AST: 21 U/L / GGT: x             12-    140  |  106  |  21  ----------------------------<  245<H>  3.4<L>   |  23  |  1.24    Ca    8.6      23 Dec 2022 06:41  Phos  2.4     12-  Mg     1.9     -    TPro  6.1  /  Alb  3.4  /  TBili  0.8  /  DBili  x   /  AST  21  /  ALT  13  /  AlkPhos  56  12-23      Thyroid Stimulating Hormone, Serum (22 @ 23:47)    Thyroid Stimulating Hormone, Serum: 0.60 uIU/mL  Angiotensin Converting Enzyme, Serum (22 @ 06:19)    Angiotensin Converting Enzyme, Serum: 33: Performed At: RN [facility]  [address] [place] [address] [id number]  Charly Mondragon MD Ph:[phone] U/L        Urinalysis Basic - ( 22 Dec 2022 15:29 )    Color: Light Yellow / Appearance: Clear / S.026 / pH: x  Gluc: x / Ketone: Negative  / Bili: Negative / Urobili: Negative   Blood: x / Protein: Trace / Nitrite: Negative   Leuk Esterase: Negative / RBC: 0 /hpf / WBC 0 /HPF     Sq Epi: x / Non Sq Epi: 0 /hpf / Bacteria: Negative  MRSA/MSSA PCR (22 @ 07:29)    MRSA PCR Result.: NotDete: The results of this test should be interpreted with consideration of  clinical context.  Not Detected result indicates the absence of organisms or that the number  of organisms is below the assay limit of detection.  Detected result indicates the presence of organism nucleic acid.  Indeterminate result may indicate the presence of amplification  inhibitors in specimen; presence or absence of organisms cannot be  determined. Consider collecting new specimen if further testing is still  needed.  This qualitative PCR assay is FDA-approved, and its performance was  established by [facility] [place] [place].    Staph [facility]        < from: Transthoracic Echocardiogram (22 @ 12:56) >  Conclusions:  1. Mitral annular calcification and calcified mitral  leaflets with normal diastolic opening. Mild mitral  regurgitation.  2. Calcified trileaflet aortic valve with normal opening.  No aortic valve regurgitation seen.  3. Normal left ventricular systolic function. No segmental  wall motion abnormalities.  4. Normal diastolic function.  5. Normal right ventricular size and function.  *** No previous Echo exam.  ------------------------------------------------------------------------  Confirmed on  2022 - 15:41:12 by Yves Gonzalez M.D.  ------------------------------------------------------------------------    < end of copied text >      RECENT CULTURES:      MICROBIOLOGY:    Respiratory Viral Panel with COVID-19 by FELIPA (22 @ 14:02)    Rapid RVP Result: [facility]    SARS-CoV-2: [facility]: This Respiratory Panel uses polymerase chain reaction (PCR) to detect for  adenovirus; coronavirus (HKU1, NL63, 229E, OC43); human metapneumovirus  (hMPV); human enterovirus/rhinovirus (Entero/RV); influenza A; influenza  A/H1; influenza A/H3; influenza A/H1-2009; influenza B; parainfluenza  viruses 1, 2, 3, 4; respiratory syncytial virus; Mycoplasma pneumoniae;  Chlamydophila pneumoniae; and SARS-CoV-2.          Radiology:    < from: CT Head No Cont (22 @ 16:19) >  IMPRESSION:  No evidence of intracranial hemorrhage. No change 2022    < end of copied text >        · Procedure Name	Left supraclavicular Lymph Node Biopsy      < from: CT Chest No Cont (22 @ 17:04) >    ACC: [id number] EXAM:  CT CHEST                          PROCEDURE DATE:  2022          INTERPRETATION:  CLINICAL INFORMATION: Heart block, syncope and   dizziness. Evaluate for sarcoidosis.    COMPARISON: Chest radiograph 2022.    CONTRAST/COMPLICATIONS:  IV Contrast: NONE  Oral Contrast: NONE  Complications: None reported at time of study completion    PROCEDURE:  CT scan of the chest was obtained without intravenous contrast.    FINDINGS:    LYMPH NODES: Mediastinal and supraclavicular lymph nodes, for reference   there is a upper tracheal lymph node with calcification that measures 3.1   x 2.9 cm (series 2 image 37), a left supraclavicular lymph node that   measures 4.1 x 2.9 cm (series 5 image 39) and a subcarinal lymph node   that measures 2.3 x 1.9 cm (series 5 image 54).    HEART/VASCULATURE: Heart size is within normal limits. No pericardial   effusion. Normal caliber aorta. Calcifications of the coronary arteries   and aorta.    AIRWAYS/LUNGS/PLEURA: The central airways are patent. There are   peripheral curvilinear opacities in the bilateral lower lobes and upper   lobes. The remainder of the lungs are clear. No pleural effusion or   pneumothorax.    UPPER ABDOMEN: Left kidney cyst.    BONES/SOFT TISSUES: Degenerative changes of the spine. No aggressive   osseous lesion.    IMPRESSION:    1.  Mediastinal and supraclavicular lymph nodes, as described above.    --- End of Report ---           EMERSON DENIS MD; Resident Radiologist  This document has been electronically signed.  DAGOBERTO FIGUEROA MD; Attending Radiologist  This document has been electronically signed. Dec 21 2022  9:27AM    < end of copied text >

## 2022-12-23 NOTE — PROGRESS NOTE ADULT - PROBLEM SELECTOR PLAN 2
-EKG showed 3:1, Mobitz type II AV block. Pt was given Atropine x3, with HRs remaining in the 40s.  -no prior history   -TSH wnl  -TTE  -cardiac MRI  -ep following, appreciate recs  -CT chest noncon: mediastinal LAD  -ICD placement on 12/21 done -EKG showed 3:1, Mobitz type II AV block. Pt was given Atropine x3, with HRs remaining in the 40s.  -no prior history   -TSH wnl  -TTE  -ep following, appreciate recs  -CT chest noncon: mediastinal LAD  - s/p ICD implant (Medtronic) 12/21/22

## 2022-12-24 LAB
ALBUMIN SERPL ELPH-MCNC: 3.6 G/DL — SIGNIFICANT CHANGE UP (ref 3.3–5)
ALP SERPL-CCNC: 60 U/L — SIGNIFICANT CHANGE UP (ref 40–120)
ALT FLD-CCNC: 16 U/L — SIGNIFICANT CHANGE UP (ref 10–45)
ANION GAP SERPL CALC-SCNC: 12 MMOL/L — SIGNIFICANT CHANGE UP (ref 5–17)
AST SERPL-CCNC: 19 U/L — SIGNIFICANT CHANGE UP (ref 10–40)
BASOPHILS # BLD AUTO: 0.03 K/UL — SIGNIFICANT CHANGE UP (ref 0–0.2)
BASOPHILS NFR BLD AUTO: 0.3 % — SIGNIFICANT CHANGE UP (ref 0–2)
BILIRUB SERPL-MCNC: 0.9 MG/DL — SIGNIFICANT CHANGE UP (ref 0.2–1.2)
BUN SERPL-MCNC: 16 MG/DL — SIGNIFICANT CHANGE UP (ref 7–23)
CALCIUM SERPL-MCNC: 8.5 MG/DL — SIGNIFICANT CHANGE UP (ref 8.4–10.5)
CHLORIDE SERPL-SCNC: 105 MMOL/L — SIGNIFICANT CHANGE UP (ref 96–108)
CO2 SERPL-SCNC: 21 MMOL/L — LOW (ref 22–31)
CREAT SERPL-MCNC: 1.1 MG/DL — SIGNIFICANT CHANGE UP (ref 0.5–1.3)
EGFR: 79 ML/MIN/1.73M2 — SIGNIFICANT CHANGE UP
EOSINOPHIL # BLD AUTO: 0.19 K/UL — SIGNIFICANT CHANGE UP (ref 0–0.5)
EOSINOPHIL NFR BLD AUTO: 1.9 % — SIGNIFICANT CHANGE UP (ref 0–6)
GLUCOSE BLDC GLUCOMTR-MCNC: 140 MG/DL — HIGH (ref 70–99)
GLUCOSE BLDC GLUCOMTR-MCNC: 144 MG/DL — HIGH (ref 70–99)
GLUCOSE BLDC GLUCOMTR-MCNC: 153 MG/DL — HIGH (ref 70–99)
GLUCOSE BLDC GLUCOMTR-MCNC: 232 MG/DL — HIGH (ref 70–99)
GLUCOSE SERPL-MCNC: 145 MG/DL — HIGH (ref 70–99)
HCT VFR BLD CALC: 39.7 % — SIGNIFICANT CHANGE UP (ref 39–50)
HGB BLD-MCNC: 12.8 G/DL — LOW (ref 13–17)
IMM GRANULOCYTES NFR BLD AUTO: 0.4 % — SIGNIFICANT CHANGE UP (ref 0–0.9)
LYMPHOCYTES # BLD AUTO: 1.5 K/UL — SIGNIFICANT CHANGE UP (ref 1–3.3)
LYMPHOCYTES # BLD AUTO: 14.8 % — SIGNIFICANT CHANGE UP (ref 13–44)
MAGNESIUM SERPL-MCNC: 1.9 MG/DL — SIGNIFICANT CHANGE UP (ref 1.6–2.6)
MCHC RBC-ENTMCNC: 25.4 PG — LOW (ref 27–34)
MCHC RBC-ENTMCNC: 32.2 GM/DL — SIGNIFICANT CHANGE UP (ref 32–36)
MCV RBC AUTO: 78.9 FL — LOW (ref 80–100)
MONOCYTES # BLD AUTO: 0.86 K/UL — SIGNIFICANT CHANGE UP (ref 0–0.9)
MONOCYTES NFR BLD AUTO: 8.5 % — SIGNIFICANT CHANGE UP (ref 2–14)
NEUTROPHILS # BLD AUTO: 7.52 K/UL — HIGH (ref 1.8–7.4)
NEUTROPHILS NFR BLD AUTO: 74.1 % — SIGNIFICANT CHANGE UP (ref 43–77)
NRBC # BLD: 0 /100 WBCS — SIGNIFICANT CHANGE UP (ref 0–0)
PHOSPHATE SERPL-MCNC: 2 MG/DL — LOW (ref 2.5–4.5)
PLATELET # BLD AUTO: 129 K/UL — LOW (ref 150–400)
POTASSIUM SERPL-MCNC: 3.3 MMOL/L — LOW (ref 3.5–5.3)
POTASSIUM SERPL-SCNC: 3.3 MMOL/L — LOW (ref 3.5–5.3)
PROT SERPL-MCNC: 6.2 G/DL — SIGNIFICANT CHANGE UP (ref 6–8.3)
RBC # BLD: 5.03 M/UL — SIGNIFICANT CHANGE UP (ref 4.2–5.8)
RBC # FLD: 14.6 % — HIGH (ref 10.3–14.5)
SODIUM SERPL-SCNC: 138 MMOL/L — SIGNIFICANT CHANGE UP (ref 135–145)
VANCOMYCIN TROUGH SERPL-MCNC: 9 UG/ML — LOW (ref 10–20)
WBC # BLD: 10.14 K/UL — SIGNIFICANT CHANGE UP (ref 3.8–10.5)
WBC # FLD AUTO: 10.14 K/UL — SIGNIFICANT CHANGE UP (ref 3.8–10.5)

## 2022-12-24 PROCEDURE — 99233 SBSQ HOSP IP/OBS HIGH 50: CPT

## 2022-12-24 PROCEDURE — 99232 SBSQ HOSP IP/OBS MODERATE 35: CPT

## 2022-12-24 RX ORDER — SODIUM,POTASSIUM PHOSPHATES 278-250MG
1 POWDER IN PACKET (EA) ORAL ONCE
Refills: 0 | Status: COMPLETED | OUTPATIENT
Start: 2022-12-24 | End: 2022-12-24

## 2022-12-24 RX ORDER — VANCOMYCIN HCL 1 G
1250 VIAL (EA) INTRAVENOUS EVERY 12 HOURS
Refills: 0 | Status: DISCONTINUED | OUTPATIENT
Start: 2022-12-24 | End: 2022-12-25

## 2022-12-24 RX ORDER — POTASSIUM CHLORIDE 20 MEQ
40 PACKET (EA) ORAL EVERY 4 HOURS
Refills: 0 | Status: COMPLETED | OUTPATIENT
Start: 2022-12-24 | End: 2022-12-24

## 2022-12-24 RX ADMIN — ATORVASTATIN CALCIUM 80 MILLIGRAM(S): 80 TABLET, FILM COATED ORAL at 22:27

## 2022-12-24 RX ADMIN — Medication 81 MILLIGRAM(S): at 11:30

## 2022-12-24 RX ADMIN — Medication 8 UNIT(S): at 17:15

## 2022-12-24 RX ADMIN — CHLORHEXIDINE GLUCONATE 1 APPLICATION(S): 213 SOLUTION TOPICAL at 06:26

## 2022-12-24 RX ADMIN — CLOPIDOGREL BISULFATE 75 MILLIGRAM(S): 75 TABLET, FILM COATED ORAL at 11:29

## 2022-12-24 RX ADMIN — Medication 166.67 MILLIGRAM(S): at 23:13

## 2022-12-24 RX ADMIN — AMLODIPINE BESYLATE 10 MILLIGRAM(S): 2.5 TABLET ORAL at 06:24

## 2022-12-24 RX ADMIN — CEFEPIME 100 MILLIGRAM(S): 1 INJECTION, POWDER, FOR SOLUTION INTRAMUSCULAR; INTRAVENOUS at 06:25

## 2022-12-24 RX ADMIN — Medication 2: at 12:38

## 2022-12-24 RX ADMIN — Medication 8 UNIT(S): at 12:38

## 2022-12-24 RX ADMIN — Medication 166.67 MILLIGRAM(S): at 11:30

## 2022-12-24 RX ADMIN — INSULIN GLARGINE 24 UNIT(S): 100 INJECTION, SOLUTION SUBCUTANEOUS at 22:28

## 2022-12-24 RX ADMIN — Medication 40 MILLIEQUIVALENT(S): at 17:15

## 2022-12-24 RX ADMIN — Medication 1 PACKET(S): at 11:29

## 2022-12-24 RX ADMIN — Medication 8 UNIT(S): at 08:38

## 2022-12-24 RX ADMIN — Medication 40 MILLIEQUIVALENT(S): at 11:33

## 2022-12-24 RX ADMIN — CEFEPIME 100 MILLIGRAM(S): 1 INJECTION, POWDER, FOR SOLUTION INTRAMUSCULAR; INTRAVENOUS at 22:28

## 2022-12-24 RX ADMIN — FINASTERIDE 5 MILLIGRAM(S): 5 TABLET, FILM COATED ORAL at 11:30

## 2022-12-24 RX ADMIN — CEFEPIME 100 MILLIGRAM(S): 1 INJECTION, POWDER, FOR SOLUTION INTRAMUSCULAR; INTRAVENOUS at 13:30

## 2022-12-24 RX ADMIN — TAMSULOSIN HYDROCHLORIDE 0.8 MILLIGRAM(S): 0.4 CAPSULE ORAL at 22:28

## 2022-12-24 NOTE — PROGRESS NOTE ADULT - PROBLEM SELECTOR PLAN 1
- febrile to 103, new clinical finding  - full set of labs ordered and vbg w/ lactate  - UA urine culture blood culture  - will initiate ABX after cultures drawn (spiked through cefazolin), will do vanc and zosyn  - vanc (12/22-?) + zosyn (12/22)  - cefepime (12/23-?)  - MRSA swab

## 2022-12-24 NOTE — PROGRESS NOTE ADULT - SUBJECTIVE AND OBJECTIVE BOX
INTERVAL HPI/OVERNIGHT EVENTS:  Pt seen and examined at bedside. No acute overnight events or complaints.    VITAL SIGNS:  T(F): 99 (22 @ 05:14)  HR: 94 (22 @ 05:14)  BP: 173/88 (22 @ 05:14)  RR: 18 (22 @ 05:14)  SpO2: 98% (22 @ 05:14)  Wt(kg): --    PHYSICAL EXAM:    Constitutional: WDWN, NAD  HEENT: PERRL, EOMI, sclera non-icteric, neck supple, trachea midline, no masses, no JVD, MMM, good dentition  Respiratory: CTA b/l, good air entry b/l, no wheezing, no rhonchi, no rales, without accessory muscle use and no intercostal retractions  Cardiovascular: RRR, normal S1S2, no M/R/G  Gastrointestinal: soft, NTND, no masses palpable, BS normal  Extremities: Warm, well perfused, pulses equal bilateral upper and lower extremities, no edema, no clubbing. Capillary refill <2 sec  Neurological: AAOx3, CN Grossly intact  Skin: Normal temperature, warm, dry    MEDICATIONS  (STANDING):  amLODIPine   Tablet 10 milliGRAM(s) Oral daily  aspirin enteric coated 81 milliGRAM(s) Oral daily  atorvastatin 80 milliGRAM(s) Oral at bedtime  cefepime   IVPB      cefepime   IVPB 1000 milliGRAM(s) IV Intermittent every 8 hours  chlorhexidine 2% Cloths 1 Application(s) Topical <User Schedule>  clopidogrel Tablet 75 milliGRAM(s) Oral daily  dextrose 50% Injectable 25 Gram(s) IV Push once  dextrose 50% Injectable 12.5 Gram(s) IV Push once  dextrose 50% Injectable 25 Gram(s) IV Push once  dextrose Oral Gel 15 Gram(s) Oral once  finasteride 5 milliGRAM(s) Oral daily  glucagon  Injectable 1 milliGRAM(s) IntraMuscular once  influenza   Vaccine 0.5 milliLiter(s) IntraMuscular once  insulin glargine Injectable (LANTUS) 24 Unit(s) SubCutaneous at bedtime  insulin lispro (ADMELOG) corrective regimen sliding scale   SubCutaneous three times a day before meals  insulin lispro (ADMELOG) corrective regimen sliding scale   SubCutaneous at bedtime  insulin lispro Injectable (ADMELOG) 8 Unit(s) SubCutaneous three times a day before meals  lactated ringers 1000 milliLiter(s) (250 mL/Hr) IV Continuous <Continuous>  potassium chloride   Solution 40 milliEquivalent(s) Oral once  tamsulosin 0.8 milliGRAM(s) Oral at bedtime  vancomycin  IVPB 1000 milliGRAM(s) IV Intermittent every 12 hours    MEDICATIONS  (PRN):  acetaminophen     Tablet .. 650 milliGRAM(s) Oral every 6 hours PRN Temp greater or equal to 38C (100.4F), Mild Pain (1 - 3)  aluminum hydroxide/magnesium hydroxide/simethicone Suspension 30 milliLiter(s) Oral every 4 hours PRN Dyspepsia  atropine Injectable 1 milliGRAM(s) IV Push once PRN symptomatic bradycardia  melatonin 3 milliGRAM(s) Oral at bedtime PRN Insomnia  ondansetron Injectable 4 milliGRAM(s) IV Push every 8 hours PRN Nausea and/or Vomiting      Allergies    No Known Allergies    Intolerances        LABS:                        12.2   9.87  )-----------( 124      ( 23 Dec 2022 06:41 )             38.6         140  |  106  |  21  ----------------------------<  245<H>  3.4<L>   |  23  |  1.24    Ca    8.6      23 Dec 2022 06:41  Phos  2.4       Mg     1.9         TPro  6.1  /  Alb  3.4  /  TBili  0.8  /  DBili  x   /  AST  21  /  ALT  13  /  AlkPhos  56        Urinalysis Basic - ( 22 Dec 2022 15:29 )    Color: Light Yellow / Appearance: Clear / S.026 / pH: x  Gluc: x / Ketone: Negative  / Bili: Negative / Urobili: Negative   Blood: x / Protein: Trace / Nitrite: Negative   Leuk Esterase: Negative / RBC: 0 /hpf / WBC 0 /HPF   Sq Epi: x / Non Sq Epi: 0 /hpf / Bacteria: Negative        RADIOLOGY & ADDITIONAL TESTS:  Reviewed      ******************  Authored By: Mayra Burks MD PGY1  Internal Medicine  Pager: [phone] [facility]// [id number] [facility]  MS Teams Preferred  ******************   INTERVAL HPI/OVERNIGHT EVENTS:  Pt seen and examined at bedside. Febrile overnight 101.8, currently on vancomycin and cefepime.     VITAL SIGNS:  T(F): 99 (22 @ 05:14)  HR: 94 (22 @ 05:14)  BP: 173/88 (22 @ 05:14)  RR: 18 (22 @ 05:14)  SpO2: 98% (22 @ 05:14)  Wt(kg): --    PHYSICAL EXAM:    Constitutional: NAD  HEENT: PERRL, EOMI, sclera non-icteric, neck supple  Respiratory: CTA b/l, good air entry b/l, no wheezing, no rhonchi, no rales  Cardiovascular: RRR, normal S1S2, no M/R/G  Gastrointestinal: soft, NTND, no masses palpable, BS normal  Extremities: Warm, well perfused, pulses equal bilateral upper and lower extremities, no edema, no clubbing. Capillary refill <2 sec  Neurological: AAOx3, CN Grossly intact  Skin: erythematous, tender upon palpation over ICD site     MEDICATIONS  (STANDING):  amLODIPine   Tablet 10 milliGRAM(s) Oral daily  aspirin enteric coated 81 milliGRAM(s) Oral daily  atorvastatin 80 milliGRAM(s) Oral at bedtime  cefepime   IVPB      cefepime   IVPB 1000 milliGRAM(s) IV Intermittent every 8 hours  chlorhexidine 2% Cloths 1 Application(s) Topical <User Schedule>  clopidogrel Tablet 75 milliGRAM(s) Oral daily  dextrose 50% Injectable 25 Gram(s) IV Push once  dextrose 50% Injectable 12.5 Gram(s) IV Push once  dextrose 50% Injectable 25 Gram(s) IV Push once  dextrose Oral Gel 15 Gram(s) Oral once  finasteride 5 milliGRAM(s) Oral daily  glucagon  Injectable 1 milliGRAM(s) IntraMuscular once  influenza   Vaccine 0.5 milliLiter(s) IntraMuscular once  insulin glargine Injectable (LANTUS) 24 Unit(s) SubCutaneous at bedtime  insulin lispro (ADMELOG) corrective regimen sliding scale   SubCutaneous three times a day before meals  insulin lispro (ADMELOG) corrective regimen sliding scale   SubCutaneous at bedtime  insulin lispro Injectable (ADMELOG) 8 Unit(s) SubCutaneous three times a day before meals  lactated ringers 1000 milliLiter(s) (250 mL/Hr) IV Continuous <Continuous>  potassium chloride   Solution 40 milliEquivalent(s) Oral once  tamsulosin 0.8 milliGRAM(s) Oral at bedtime  vancomycin  IVPB 1000 milliGRAM(s) IV Intermittent every 12 hours    MEDICATIONS  (PRN):  acetaminophen     Tablet .. 650 milliGRAM(s) Oral every 6 hours PRN Temp greater or equal to 38C (100.4F), Mild Pain (1 - 3)  aluminum hydroxide/magnesium hydroxide/simethicone Suspension 30 milliLiter(s) Oral every 4 hours PRN Dyspepsia  atropine Injectable 1 milliGRAM(s) IV Push once PRN symptomatic bradycardia  melatonin 3 milliGRAM(s) Oral at bedtime PRN Insomnia  ondansetron Injectable 4 milliGRAM(s) IV Push every 8 hours PRN Nausea and/or Vomiting      Allergies    No Known Allergies    Intolerances        LABS:                        12.2   9.87  )-----------( 124      ( 23 Dec 2022 06:41 )             38.6         140  |  106  |  21  ----------------------------<  245<H>  3.4<L>   |  23  |  1.24    Ca    8.6      23 Dec 2022 06:41  Phos  2.4       Mg     1.9         TPro  6.1  /  Alb  3.4  /  TBili  0.8  /  DBili  x   /  AST  21  /  ALT  13  /  AlkPhos  56        Urinalysis Basic - ( 22 Dec 2022 15:29 )    Color: Light Yellow / Appearance: Clear / S.026 / pH: x  Gluc: x / Ketone: Negative  / Bili: Negative / Urobili: Negative   Blood: x / Protein: Trace / Nitrite: Negative   Leuk Esterase: Negative / RBC: 0 /hpf / WBC 0 /HPF   Sq Epi: x / Non Sq Epi: 0 /hpf / Bacteria: Negative        RADIOLOGY & ADDITIONAL TESTS:  Reviewed      ******************  Authored By: Mayra Burks MD PGY1  Internal Medicine  Pager: [phone] [facility]// [id number] [facility]  MS Teams Preferred  ******************

## 2022-12-24 NOTE — PROGRESS NOTE ADULT - ASSESSMENT
58yo male with PMH of prior CVA with mild left sided deficits, HTN, DM, vertigo and HLD presents with Mobitz II 3:1 AVB, symptomatic. TTE nml Secondary workup most remarkable for CT mediastinal and supraclavicular LN, raising concern for Sarcoidosis    1. Mobitz II AV Block  2. R/o Sarcoidosis    - s/p Medtronic dual chamber ICD implant (12/21/22)  - Febrile to 101.8 last night  - Post op fever noted unknown etiology, sepsis protocol initiated, BCx NGTD  - Monitor ICD wound site for bleeding, hematoma, redness, or drainage  - Plan for repeat biopsy next week with IR   - Need outpatient PET Scan r/o Sarcoidosis (ordered in [employer]), will schedule as outpatient  - Follow up with [facility] on 1/6/22 @ 2:40pm  - Continue telemetry monitoring  - Keep K 4.0 and Mg 2.0, supplement as needed 56yo male with PMH of prior CVA with mild left sided deficits, HTN, DM, vertigo and HLD presents with Mobitz II 3:1 AVB, symptomatic. TTE nml Secondary workup most remarkable for CT mediastinal and supraclavicular LN, raising concern for Sarcoidosis    1. Mobitz II AV Block  2. R/o Sarcoidosis    - s/p Medtronic dual chamber ICD implant (12/21/22)  - Febrile to 101.8 last night, on Vancomycin and Cefepime  - Post op fever noted unknown etiology, sepsis protocol initiated, BCx NGTD  - Monitor ICD wound site for bleeding, hematoma, redness, or drainage  - Plan for repeat biopsy next week with IR   - Need outpatient PET Scan r/o Sarcoidosis (ordered in [employer]), will schedule as outpatient  - Follow up with [facility] on 1/6/22 @ 2:40pm  - Continue telemetry monitoring  - Keep K 4.0 and Mg 2.0, supplement as needed

## 2022-12-24 NOTE — PROGRESS NOTE ADULT - PROBLEM SELECTOR PLAN 3
-LAD seen on CT noncontrast   -pulm c/s, possible biopsy through ebus?  -rheum c/s: outpt f/u w/ biopsy result and lab work  -outpatient PETScan r/o Sarcoidosis (ordered in [employer]), will schedule as outpatient  -follicular thyroid tissue seen in biopsy sample  -will require repeat biopsy of supraclavicular, scheduled for 12/27  -NPO at midnight 12/27 and hold AC morning of  -if pt leaves before then, will require pcp to write a script for IR biopsy, insurance authorization, full set of labs before scheduling procedure (call [phone] to make appt)  -U/s of thyroid -LAD seen on CT noncontrast   -pulm c/s, possible biopsy through ebus?  -rheum c/s: outpt f/u w/ biopsy result and lab work  -outpatient PETScan r/o Sarcoidosis (ordered in [employer]), will schedule as outpatient  -follicular thyroid tissue seen in biopsy sample  -will require repeat biopsy of supraclavicular, scheduled for 12/27  -NPO at midnight 12/27 and hold AC morning of  -if pt leaves before then, will require pcp to write a script for IR biopsy, insurance authorization, full set of labs before scheduling procedure (call [phone] to make appt)  -U/s of thyroid: Heterogeneous mass along the left lower pole, measuring 3.4 x 2.6 x 3.4 cm, possibly exophytic thyroid nodule.

## 2022-12-24 NOTE — PROGRESS NOTE ADULT - PROBLEM SELECTOR PLAN 2
-EKG showed 3:1, Mobitz type II AV block. Pt was given Atropine x3, with HRs remaining in the 40s.  -no prior history   -TSH wnl  -TTE  -ep following, appreciate recs  -CT chest noncon: mediastinal LAD  - s/p ICD implant (Medtronic) 12/21/22 -EKG showed 3:1, Mobitz type II AV block. Pt was given Atropine x3, with HRs remaining in the 40s.  -no prior history   -TSH wnl  -TTE  -ep following, appreciate recs  -CT chest noncon: mediastinal LAD  - s/p ICD implant (Medtronic) 12/21/22  - tenderness/erythema over the ICD implant area

## 2022-12-24 NOTE — PROGRESS NOTE ADULT - SUBJECTIVE AND OBJECTIVE BOX
24H hour events: No acute events    MEDICATIONS:  amLODIPine   Tablet 10 milliGRAM(s) Oral daily  aspirin enteric coated 81 milliGRAM(s) Oral daily  clopidogrel Tablet 75 milliGRAM(s) Oral daily  cefepime   IVPB      cefepime   IVPB 1000 milliGRAM(s) IV Intermittent every 8 hours  vancomycin  IVPB 1250 milliGRAM(s) IV Intermittent every 12 hours  acetaminophen     Tablet .. 650 milliGRAM(s) Oral every 6 hours PRN  melatonin 3 milliGRAM(s) Oral at bedtime PRN  ondansetron Injectable 4 milliGRAM(s) IV Push every 8 hours PRN  aluminum hydroxide/magnesium hydroxide/simethicone Suspension 30 milliLiter(s) Oral every 4 hours PRN  atropine Injectable 1 milliGRAM(s) IV Push once PRN  atorvastatin 80 milliGRAM(s) Oral at bedtime  dextrose 50% Injectable 25 Gram(s) IV Push once  dextrose 50% Injectable 12.5 Gram(s) IV Push once  dextrose 50% Injectable 25 Gram(s) IV Push once  dextrose Oral Gel 15 Gram(s) Oral once  finasteride 5 milliGRAM(s) Oral daily  glucagon  Injectable 1 milliGRAM(s) IntraMuscular once  insulin glargine Injectable (LANTUS) 24 Unit(s) SubCutaneous at bedtime  insulin lispro (ADMELOG) corrective regimen sliding scale   SubCutaneous three times a day before meals  insulin lispro (ADMELOG) corrective regimen sliding scale   SubCutaneous at bedtime  insulin lispro Injectable (ADMELOG) 8 Unit(s) SubCutaneous three times a day before meals  chlorhexidine 2% Cloths 1 Application(s) Topical <User Schedule>  influenza   Vaccine 0.5 milliLiter(s) IntraMuscular once  lactated ringers 1000 milliLiter(s) IV Continuous <Continuous>  potassium chloride   Solution 40 milliEquivalent(s) Oral once  tamsulosin 0.8 milliGRAM(s) Oral at bedtime      REVIEW OF SYSTEMS:  Complete 12 point ROS negative.    PHYSICAL EXAM:  T(C): 37.2 (12-24-22 @ 05:14), Max: 38.8 (12-23-22 @ 20:05)  HR: 94 (12-24-22 @ 05:14) (85 - 100)  BP: 173/88 (12-24-22 @ 05:14) (168/90 - 173/88)  RR: 18 (12-24-22 @ 05:14) (18 - 18)  SpO2: 98% (12-24-22 @ 05:14) (97% - 98%)  Wt(kg): --  I&O's Summary    23 Dec 2022 07:01  -  24 Dec 2022 07:00  --------------------------------------------------------  IN: 240 mL / OUT: 0 mL / NET: 240 mL        Appearance: Normal	  HEENT:  PERRL, EOMI	  Cardiovascular: Normal S1 S2, No JVD, No murmurs, No edema  Respiratory: Lungs clear to auscultation	  Psychiatry: A & O x 3, Mood & affect appropriate  Gastrointestinal:  Soft, Non-tender, + BS	  Skin: left chest incision C/D/I with slight redness, No rashes, No ecchymoses, No hematoma	  Neurologic: Non-focal  Extremities: No clubbing, cyanosis or edema  Vascular: Peripheral pulses palpable 2+ bilaterally        LABS:	 	    CBC Full  -  ( 24 Dec 2022 08:45 )  WBC Count : 10.14 K/uL  Hemoglobin : 12.8 g/dL  Hematocrit : 39.7 %  Platelet Count - Automated : 129 K/uL  Mean Cell Volume : 78.9 fl  Mean Cell Hemoglobin : 25.4 pg  Mean Cell Hemoglobin Concentration : 32.2 gm/dL  Auto Neutrophil # : 7.52 K/uL  Auto Lymphocyte # : 1.50 K/uL  Auto Monocyte # : 0.86 K/uL  Auto Eosinophil # : 0.19 K/uL  Auto Basophil # : 0.03 K/uL  Auto Neutrophil % : 74.1 %  Auto Lymphocyte % : 14.8 %  Auto Monocyte % : 8.5 %  Auto Eosinophil % : 1.9 %  Auto Basophil % : 0.3 %    12-24    138  |  105  |  16  ----------------------------<  145<H>  3.3<L>   |  21<L>  |  1.10  12-23    140  |  106  |  21  ----------------------------<  245<H>  3.4<L>   |  23  |  1.24    Ca    8.5      24 Dec 2022 08:45  Ca    8.6      23 Dec 2022 06:41  Phos  2.0     12-24  Phos  2.4     12-23  Mg     1.9     12-24  Mg     1.9     12-23    TPro  6.2  /  Alb  3.6  /  TBili  0.9  /  DBili  x   /  AST  19  /  ALT  16  /  AlkPhos  60  12-24  TPro  6.1  /  Alb  3.4  /  TBili  0.8  /  DBili  x   /  AST  21  /  ALT  13  /  AlkPhos  56  12-23    TELEMETRY: SR with ventricular pacing 80-90s	      < from: Transthoracic Echocardiogram (12.20.22 @ 12:56) >  PROCEDURE: Transthoracic echocardiogram with 2-D, M-Mode  and complete spectral and color flow Doppler.  INDICATION: Abnormal electrocardiogram  (R94.31 )  ------------------------------------------------------------------------  Dimensions:    Normal Values:  LA:     3.5    2.0 - 4.0 cm  Ao:     3.2    2.0 - 3.8 cm  SEPTUM: 1.0    0.6 - 1.2 cm  PWT:    1.0    0.6 - 1.1 cm  LVIDd:  4.5    3.0 - 5.6 cm  LVIDs:  2.9    1.8 - 4.0 cm  Derived variables:  LVMI: 85 g/m2  RWT: 0.48  Fractional short: 36 %  EF (Modified Mayers Rule): 57 %Doppler Peak Velocity  (m/sec): AoV=2.2  ------------------------------------------------------------------------  Observations:  Mitral Valve: Mitral annular calcification and calcified  mitral leaflets with normal diastolic opening. Mild mitral  regurgitation.  Aortic Valve/Aorta: Calcified trileaflet aortic valve with  normal opening. Peak transaortic valve gradient equals 20  mm Hg, mean transaortic valve gradient equals 9 mm Hg,  aortic valve velocity time integral equals 47 cm, estimated  aortic valve area equals 2.1 sqcm. No aortic valve  regurgitation seen. Peak left ventricular outflow tract  gradient equals 8 mm Hg, mean gradient is equal to 5 mm Hg,  LVOT velocity time integral equals 37 cm.  Aortic Root: 3.2 cm.  LVOT diameter: 1.7 cm.  Left Atrium: Normal left atrium.  LA volume index = 27  cc/m2.  Left Ventricle: Normal left ventricular systolic function.  No segmental wall motion abnormalities. Increased relative  wall thickness with normal left ventricular mass index,  consistent with concentric left ventricular remodeling.  Normal diastolic function.  Right Heart: Normal right atrium. Normal right ventricular  size and function. Normal tricuspid valve. Mild tricuspid  regurgitation. Normal pulmonic valve. No pulmonic  regurgitation.  Pericardium/Pleura: Normal pericardium with no pericardial  effusion.  Hemodynamic: Estimated right atrial pressure is 8 mm Hg.  Estimated right ventricular systolic pressure equals 35 mm  Hg, assuming right atrial pressure equals 8 mm Hg,  consistent with borderline pulmonary hypertension.  ------------------------------------------------------------------------  Conclusions:  1. Mitral annular calcification and calcified mitral  leaflets with normal diastolic opening. Mild mitral  regurgitation.  2. Calcified trileaflet aortic valve with normal opening.  No aortic valve regurgitation seen.  3. Normal left ventricular systolic function. No segmental  wall motion abnormalities.  4. Normal diastolic function.  5. Normal right ventricular size an    < end of copied text >

## 2022-12-24 NOTE — PROGRESS NOTE ADULT - SUBJECTIVE AND OBJECTIVE BOX
Follow Up:  concern for ICD infection, fever    Interval History/ROS:  Overnight: Patient with a T-max of 101.8 Fahrenheit.  Otherwise hemodynamically stable on room air.  Latest labs show no leukocytosis, thrombocytopenia 129, CMP with renal and hepatic function within normal limits.  Vancomycin level 9.  Blood cultures from 2022 with no growth to date.  MRSA nares negative. Urine culture negative.  Patient continues on vancomycin and cefepime.      Allergies  No Known Allergies        ANTIMICROBIALS:  cefepime   IVPB    cefepime   IVPB 1000 every 8 hours  vancomycin  IVPB 1250 every 12 hours      OTHER MEDS:  MEDICATIONS  (STANDING):  acetaminophen     Tablet .. 650 every 6 hours PRN  aluminum hydroxide/magnesium hydroxide/simethicone Suspension 30 every 4 hours PRN  amLODIPine   Tablet 10 daily  aspirin enteric coated 81 daily  atorvastatin 80 at bedtime  atropine Injectable 1 once PRN  clopidogrel Tablet 75 daily  dextrose 50% Injectable 25 once  dextrose 50% Injectable 12.5 once  dextrose 50% Injectable 25 once  dextrose Oral Gel 15 once  finasteride 5 daily  glucagon  Injectable 1 once  influenza   Vaccine 0.5 once  insulin glargine Injectable (LANTUS) 24 at bedtime  insulin lispro (ADMELOG) corrective regimen sliding scale  three times a day before meals  insulin lispro (ADMELOG) corrective regimen sliding scale  at bedtime  insulin lispro Injectable (ADMELOG) 8 three times a day before meals  melatonin 3 at bedtime PRN  ondansetron Injectable 4 every 8 hours PRN  tamsulosin 0.8 at bedtime      Vital Signs Last 24 Hrs  T(C): 37.2 (24 Dec 2022 05:14), Max: 38.8 (23 Dec 2022 20:05)  T(F): 99 (24 Dec 2022 05:14), Max: 101.8 (23 Dec 2022 20:05)  HR: 94 (24 Dec 2022 05:14) (85 - 100)  BP: 173/88 (24 Dec 2022 05:14) (168/90 - 173/88)  BP(mean): --  RR: 18 (24 Dec 2022 05:14) (18 - 18)  SpO2: 98% (24 Dec 2022 05:14) (97% - 98%)    Parameters below as of 24 Dec 2022 05:14  Patient On (Oxygen Delivery Method): room air        PHYSICAL EXAM:  Constitutional:Comfortable.Awake and alert  Neck:Supple,No LN,no JVD  Respiratory:Good air entry bilaterally,CTA  Cardiovascular:   AICD site with surrounding subtle  erythema to axilla  no purulence or swelling  Gastrointestinal:Soft BS(+) no tenderness   Extremities:No cyanosis,clubbing or edema.  Neurological:AAO X 3,No grossly focal deficits  Skin:No rash, phebitis   Musculoskeletal:No joint swelling or LOM                                12.8   10.14 )-----------( 129      ( 24 Dec 2022 08:45 )             39.7           138  |  105  |  16  ----------------------------<  145<H>  3.3<L>   |  21<L>  |  1.10    Ca    8.5      24 Dec 2022 08:45  Phos  2.0       Mg     1.9         TPro  6.2  /  Alb  3.6  /  TBili  0.9  /  DBili  x   /  AST  19  /  ALT  16  /  AlkPhos  60        Urinalysis Basic - ( 22 Dec 2022 15:29 )    Color: Light Yellow / Appearance: Clear / S.026 / pH: x  Gluc: x / Ketone: Negative  / Bili: Negative / Urobili: Negative   Blood: x / Protein: Trace / Nitrite: Negative   Leuk Esterase: Negative / RBC: 0 /hpf / WBC 0 /HPF   Sq Epi: x / Non Sq Epi: 0 /hpf / Bacteria: Negative        MICROBIOLOGY:  Vancomycin Level, Trough: 9.0 ug/mL (22 @ 08:45)  v    Culture - Urine (collected 22 Dec 2022 15:29)  Source: Clean Catch Clean Catch (Midstream)  Final Report (23 Dec 2022 22:04):    <10,000 CFU/mL Normal Urogenital Tamela    Culture - Blood (collected 22 Dec 2022 12:30)  Source: .Blood Blood-Peripheral  Preliminary Report (23 Dec 2022 18:01):    No growth to date.    Culture - Blood (collected 22 Dec 2022 12:30)  Source: .Blood Blood-Peripheral  Preliminary Report (23 Dec 2022 18:01):    No growth to date.              Rapid RVP Result: NotDetec ( @ 14:02)        RADIOLOGY:   Follow Up:  concern for ICD infection, fever    Interval History/ROS:  Overnight: Patient with a T-max of 101.8 Fahrenheit.  Otherwise hemodynamically stable on room air.  Latest labs show no leukocytosis, thrombocytopenia 129, CMP with renal and hepatic function within normal limits.  Vancomycin level 9.  Blood cultures from 2022 with no growth to date.  MRSA nares negative. Urine culture negative.  Patient continues on vancomycin and cefepime.    Patient seen and examined at bedside. Denies any new pain or discomfort.      Allergies  No Known Allergies        ANTIMICROBIALS:  cefepime   IVPB    cefepime   IVPB 1000 every 8 hours  vancomycin  IVPB 1250 every 12 hours      OTHER MEDS:  MEDICATIONS  (STANDING):  acetaminophen     Tablet .. 650 every 6 hours PRN  aluminum hydroxide/magnesium hydroxide/simethicone Suspension 30 every 4 hours PRN  amLODIPine   Tablet 10 daily  aspirin enteric coated 81 daily  atorvastatin 80 at bedtime  atropine Injectable 1 once PRN  clopidogrel Tablet 75 daily  dextrose 50% Injectable 25 once  dextrose 50% Injectable 12.5 once  dextrose 50% Injectable 25 once  dextrose Oral Gel 15 once  finasteride 5 daily  glucagon  Injectable 1 once  influenza   Vaccine 0.5 once  insulin glargine Injectable (LANTUS) 24 at bedtime  insulin lispro (ADMELOG) corrective regimen sliding scale  three times a day before meals  insulin lispro (ADMELOG) corrective regimen sliding scale  at bedtime  insulin lispro Injectable (ADMELOG) 8 three times a day before meals  melatonin 3 at bedtime PRN  ondansetron Injectable 4 every 8 hours PRN  tamsulosin 0.8 at bedtime      Vital Signs Last 24 Hrs  T(C): 37.2 (24 Dec 2022 05:14), Max: 38.8 (23 Dec 2022 20:05)  T(F): 99 (24 Dec 2022 05:14), Max: 101.8 (23 Dec 2022 20:05)  HR: 94 (24 Dec 2022 05:14) (85 - 100)  BP: 173/88 (24 Dec 2022 05:14) (168/90 - 173/88)  BP(mean): --  RR: 18 (24 Dec 2022 05:14) (18 - 18)  SpO2: 98% (24 Dec 2022 05:14) (97% - 98%)    Parameters below as of 24 Dec 2022 05:14  Patient On (Oxygen Delivery Method): room air        PHYSICAL EXAM:  Constitutional:Comfortable.Awake and alert  Neck:Supple,No LN,no JVD  Respiratory:Good air entry bilaterally,CTA  Cardiovascular:   AICD site with surrounding subtle erythema to axilla  no purulence, tednerness or swelling  Gastrointestinal:Soft BS(+) no tenderness   Extremities:No cyanosis,clubbing or edema.  Neurological:AAO X 3,No grossly focal deficits  Skin:No rash, phebitis   Musculoskeletal:No joint swelling or LOM                                12.8   10.14 )-----------( 129      ( 24 Dec 2022 08:45 )             39.7       12    138  |  105  |  16  ----------------------------<  145<H>  3.3<L>   |  21<L>  |  1.10    Ca    8.5      24 Dec 2022 08:45  Phos  2.0       Mg     1.9         TPro  6.2  /  Alb  3.6  /  TBili  0.9  /  DBili  x   /  AST  19  /  ALT  16  /  AlkPhos  60  24      Urinalysis Basic - ( 22 Dec 2022 15:29 )    Color: Light Yellow / Appearance: Clear / S.026 / pH: x  Gluc: x / Ketone: Negative  / Bili: Negative / Urobili: Negative   Blood: x / Protein: Trace / Nitrite: Negative   Leuk Esterase: Negative / RBC: 0 /hpf / WBC 0 /HPF   Sq Epi: x / Non Sq Epi: 0 /hpf / Bacteria: Negative        MICROBIOLOGY:  Vancomycin Level, Trough: 9.0 ug/mL (22 @ 08:45)  v    Culture - Urine (collected 22 Dec 2022 15:29)  Source: Clean Catch Clean Catch (Midstream)  Final Report (23 Dec 2022 22:04):    <10,000 CFU/mL Normal Urogenital Tamela    Culture - Blood (collected 22 Dec 2022 12:30)  Source: .Blood Blood-Peripheral  Preliminary Report (23 Dec 2022 18:01):    No growth to date.    Culture - Blood (collected 22 Dec 2022 12:30)  Source: .Blood Blood-Peripheral  Preliminary Report (23 Dec 2022 18:01):    No growth to date.              Rapid RVP Result: NotDetec ( @ 14:02)        RADIOLOGY:

## 2022-12-24 NOTE — PROGRESS NOTE ADULT - ATTENDING COMMENTS
57 y.o male h/of CAD, CVA (w/o residual weakness), DM, p/w bradycardia. EKG concerning for 3:1, Mobitz type II AV block. CT chest: Mediastinal and supraclavicular lymph nodes. ICD placed on 12/21. c/b fever: infectious work up underway .     - Preliminary pathology reports of L supraclavicular LN positive for follicular thyroid tissue  - per d/w pathologist and IR plan to repeat L supraclavicular LN biopsy Tuesday @4PM pt and family in agreement   - C/w Zosyn and Vancomycin, monitor AICD site closely for infection, currently appears clean, does not appear to be the source of infection   - f/u MRSA results if neg will likely d/c Vancomycin   - Highly recommend outpatient PET/CT scan   - Close outpt f/u w/ cards and rheum  - ID & EP following.

## 2022-12-24 NOTE — PROGRESS NOTE ADULT - ASSESSMENT
Patient is a 57 y M with a PMHx of CAD, CVA (2012 with no significant residual weakness), DM, p/w bradycardia. EKG concerning for 3:1, Mobitz type II AV block. CT chest: Mediastinal and supraclavicular lymph nodes. ICD placed on 12/21.  However pt spiking fevers post procedure, infectious workup ongoing. Currently receiving vancomycin and cefepime. Pt is also s/p l. supraclavicular LN biopsy on 12/21 which was inconclusive (see path report), necessitating another biopsy scheduled for 12/27.

## 2022-12-24 NOTE — PROGRESS NOTE ADULT - ASSESSMENT
Patient is a 57 y M with a PMHx of CAD, CVA (2012 with no significant residual weakness), DM, p/w bradycardia. The day prior to admission, pt reports feeling dizzy, off-balance, unable to walk. He also experienced nausea and had some episodes of vomiting. He went to [facility] where he was noted to be bradycardic, was transferred here. Upon presentation, HR in the 30s. EKG concerning for 3:1, Mobitz type II AV block. Pt was given Atropine x3, with HRs remaining in the 40s. Pt denies chest pain, dyspnea, abdominal pain. Pt does not regularly follow up with a cardiologist but had seen one (for the first time), 2 months after being discharged from the ED of an OSH for abdominal pain. Per patient report, stress test was normal.      Pt denies sick contacts and recent travel. (20 Dec 2022 09:50)  Hospital course: Found to be bradycardic to the 30s-40s with EKG showing 3:1 Mobitz type II AV block. CT chest showing incidental mediastinal and supraclavicular lymph nodes (upper tracheal lymph node with calcification that measures 3.1  x 2.9 cm), left supraclavicular lymph node (4.1 x 2.9 cm), subcarinal lymph node (2.3 x 1.9 cm).    12/21 - AICD placed. Pt also with lymph node biopsy    Preliminary pathology reports of L supraclavicular LN positive for follicular thyroid tissue (read over the phone by Dr. Liset Ahn [phone]). Unsure if tissue was sampled near the thyroid or thyroid tissue originates from a source of malignancy/ectopic area. Discussed case with Dr. Ahn as well as radiologist Dr. Morgan Reece. A second biopsy would be beneficial to confirm pathology report.    Pt spiked fevers overnight on 12/22 and was cultured and started on vanco and zosyn    ID is asked to evaluate      A/P  #FEVER  Pt with fevers that started yesterday  pt denies Gi , or URI sxs  IV site clean   pt with erythema over the AICD site, no purulence or swelling   Biopsy site in same vicinity but no definite swelling or discharge over that specifically   Pt cultured  and started on abs, on vancomycin and cefepime  nasal MRSA swab negative- would discontinue vancomycin if blood cultures remain negative  follow vancomycin level  discussed with EPS and they are following the wound  Perhaps irritation from prep but concerning with fever  follow H/H - slight drop  follow the wound closely of left chest   Exploration of non-infectious causes for fever per primary team    #INGRID  pt s/p biopsy  preliminary biopsy noted  for excisional biopsy  check LDH  QuantiFeron pending       Reed Barrera MD  please reach via teams   If no answer, or after 5PM/ weekends,  then please call  [phone]   Patient is a 57 y M with a PMHx of CAD, CVA (2012 with no significant residual weakness), DM, p/w bradycardia. The day prior to admission, pt reports feeling dizzy, off-balance, unable to walk. He also experienced nausea and had some episodes of vomiting. He went to [facility] where he was noted to be bradycardic, was transferred here. Upon presentation, HR in the 30s. EKG concerning for 3:1, Mobitz type II AV block. Pt was given Atropine x3, with HRs remaining in the 40s. Pt denies chest pain, dyspnea, abdominal pain. Pt does not regularly follow up with a cardiologist but had seen one (for the first time), 2 months after being discharged from the ED of an OSH for abdominal pain. Per patient report, stress test was normal.      Pt denies sick contacts and recent travel. (20 Dec 2022 09:50)  Hospital course: Found to be bradycardic to the 30s-40s with EKG showing 3:1 Mobitz type II AV block. CT chest showing incidental mediastinal and supraclavicular lymph nodes (upper tracheal lymph node with calcification that measures 3.1  x 2.9 cm), left supraclavicular lymph node (4.1 x 2.9 cm), subcarinal lymph node (2.3 x 1.9 cm).    12/21 - AICD placed. Pt also with lymph node biopsy    Pt spiked fevers overnight on 12/22 and was cultured and started on vanco and zosyn    ID is asked to evaluate      A/P  #FEVER  Pt with fevers that started yesterday  pt denies Gi , or URI sxs  IV site clean   pt with mild erythema over the AICD site, no purulence, tenderness or swelling   Biopsy site in same vicinity but no definite swelling or discharge over that specifically   Pt cultured  and started on abs, on vancomycin and cefepime  discussed with EPS and they are following the wound  Perhaps irritation from prep but concerning with fever  follow H/H - slight drop  follow the wound closely of left chest   Exploration of non-infectious causes for fever per primary team  nasal MRSA swab negative- would discontinue vancomycin if blood cultures remain negative  follow vancomycin level    #INGRID  pt s/p biopsy  preliminary biopsy noted  for excisional biopsy  check LDH  QuantiFeron pending       Reed Barrera MD  please reach via teams   If no answer, or after 5PM/ weekends,  then please call  [phone]

## 2022-12-25 LAB
ALBUMIN SERPL ELPH-MCNC: 3.3 G/DL — SIGNIFICANT CHANGE UP (ref 3.3–5)
ALP SERPL-CCNC: 64 U/L — SIGNIFICANT CHANGE UP (ref 40–120)
ALT FLD-CCNC: 21 U/L — SIGNIFICANT CHANGE UP (ref 10–45)
ANION GAP SERPL CALC-SCNC: 11 MMOL/L — SIGNIFICANT CHANGE UP (ref 5–17)
AST SERPL-CCNC: 23 U/L — SIGNIFICANT CHANGE UP (ref 10–40)
BASOPHILS # BLD AUTO: 0.02 K/UL — SIGNIFICANT CHANGE UP (ref 0–0.2)
BASOPHILS NFR BLD AUTO: 0.2 % — SIGNIFICANT CHANGE UP (ref 0–2)
BILIRUB SERPL-MCNC: 0.8 MG/DL — SIGNIFICANT CHANGE UP (ref 0.2–1.2)
BUN SERPL-MCNC: 18 MG/DL — SIGNIFICANT CHANGE UP (ref 7–23)
CALCIUM SERPL-MCNC: 8.3 MG/DL — LOW (ref 8.4–10.5)
CHLORIDE SERPL-SCNC: 107 MMOL/L — SIGNIFICANT CHANGE UP (ref 96–108)
CO2 SERPL-SCNC: 21 MMOL/L — LOW (ref 22–31)
CREAT SERPL-MCNC: 1.13 MG/DL — SIGNIFICANT CHANGE UP (ref 0.5–1.3)
EGFR: 77 ML/MIN/1.73M2 — SIGNIFICANT CHANGE UP
EOSINOPHIL # BLD AUTO: 0.15 K/UL — SIGNIFICANT CHANGE UP (ref 0–0.5)
EOSINOPHIL NFR BLD AUTO: 1.8 % — SIGNIFICANT CHANGE UP (ref 0–6)
GAMMA INTERFERON BACKGROUND BLD IA-ACNC: 0.03 IU/ML — SIGNIFICANT CHANGE UP
GLUCOSE BLDC GLUCOMTR-MCNC: 130 MG/DL — HIGH (ref 70–99)
GLUCOSE BLDC GLUCOMTR-MCNC: 132 MG/DL — HIGH (ref 70–99)
GLUCOSE BLDC GLUCOMTR-MCNC: 150 MG/DL — HIGH (ref 70–99)
GLUCOSE SERPL-MCNC: 151 MG/DL — HIGH (ref 70–99)
HCT VFR BLD CALC: 37.4 % — LOW (ref 39–50)
HGB BLD-MCNC: 12.3 G/DL — LOW (ref 13–17)
IMM GRANULOCYTES NFR BLD AUTO: 0.4 % — SIGNIFICANT CHANGE UP (ref 0–0.9)
LYMPHOCYTES # BLD AUTO: 1.11 K/UL — SIGNIFICANT CHANGE UP (ref 1–3.3)
LYMPHOCYTES # BLD AUTO: 13.2 % — SIGNIFICANT CHANGE UP (ref 13–44)
M TB IFN-G BLD-IMP: ABNORMAL
M TB IFN-G CD4+ BCKGRND COR BLD-ACNC: 0 IU/ML — SIGNIFICANT CHANGE UP
M TB IFN-G CD4+CD8+ BCKGRND COR BLD-ACNC: -0.01 IU/ML — SIGNIFICANT CHANGE UP
MAGNESIUM SERPL-MCNC: 2 MG/DL — SIGNIFICANT CHANGE UP (ref 1.6–2.6)
MCHC RBC-ENTMCNC: 25.9 PG — LOW (ref 27–34)
MCHC RBC-ENTMCNC: 32.9 GM/DL — SIGNIFICANT CHANGE UP (ref 32–36)
MCV RBC AUTO: 78.7 FL — LOW (ref 80–100)
MONOCYTES # BLD AUTO: 0.75 K/UL — SIGNIFICANT CHANGE UP (ref 0–0.9)
MONOCYTES NFR BLD AUTO: 8.9 % — SIGNIFICANT CHANGE UP (ref 2–14)
NEUTROPHILS # BLD AUTO: 6.34 K/UL — SIGNIFICANT CHANGE UP (ref 1.8–7.4)
NEUTROPHILS NFR BLD AUTO: 75.5 % — SIGNIFICANT CHANGE UP (ref 43–77)
NRBC # BLD: 0 /100 WBCS — SIGNIFICANT CHANGE UP (ref 0–0)
PHOSPHATE SERPL-MCNC: 1.7 MG/DL — LOW (ref 2.5–4.5)
PLATELET # BLD AUTO: 148 K/UL — LOW (ref 150–400)
POTASSIUM SERPL-MCNC: 3.8 MMOL/L — SIGNIFICANT CHANGE UP (ref 3.5–5.3)
POTASSIUM SERPL-SCNC: 3.8 MMOL/L — SIGNIFICANT CHANGE UP (ref 3.5–5.3)
PROT SERPL-MCNC: 6.4 G/DL — SIGNIFICANT CHANGE UP (ref 6–8.3)
QUANT TB PLUS MITOGEN MINUS NIL: 0.27 IU/ML — SIGNIFICANT CHANGE UP
RBC # BLD: 4.75 M/UL — SIGNIFICANT CHANGE UP (ref 4.2–5.8)
RBC # FLD: 14.4 % — SIGNIFICANT CHANGE UP (ref 10.3–14.5)
SODIUM SERPL-SCNC: 139 MMOL/L — SIGNIFICANT CHANGE UP (ref 135–145)
WBC # BLD: 8.4 K/UL — SIGNIFICANT CHANGE UP (ref 3.8–10.5)
WBC # FLD AUTO: 8.4 K/UL — SIGNIFICANT CHANGE UP (ref 3.8–10.5)

## 2022-12-25 PROCEDURE — 99223 1ST HOSP IP/OBS HIGH 75: CPT

## 2022-12-25 PROCEDURE — 99233 SBSQ HOSP IP/OBS HIGH 50: CPT

## 2022-12-25 RX ORDER — SODIUM,POTASSIUM PHOSPHATES 278-250MG
1 POWDER IN PACKET (EA) ORAL ONCE
Refills: 0 | Status: COMPLETED | OUTPATIENT
Start: 2022-12-25 | End: 2022-12-25

## 2022-12-25 RX ADMIN — CEFEPIME 100 MILLIGRAM(S): 1 INJECTION, POWDER, FOR SOLUTION INTRAMUSCULAR; INTRAVENOUS at 06:31

## 2022-12-25 RX ADMIN — INSULIN GLARGINE 24 UNIT(S): 100 INJECTION, SOLUTION SUBCUTANEOUS at 22:32

## 2022-12-25 RX ADMIN — CEFEPIME 100 MILLIGRAM(S): 1 INJECTION, POWDER, FOR SOLUTION INTRAMUSCULAR; INTRAVENOUS at 22:32

## 2022-12-25 RX ADMIN — FINASTERIDE 5 MILLIGRAM(S): 5 TABLET, FILM COATED ORAL at 12:14

## 2022-12-25 RX ADMIN — Medication 8 UNIT(S): at 12:15

## 2022-12-25 RX ADMIN — Medication 1 PACKET(S): at 12:14

## 2022-12-25 RX ADMIN — AMLODIPINE BESYLATE 10 MILLIGRAM(S): 2.5 TABLET ORAL at 06:18

## 2022-12-25 RX ADMIN — Medication 8 UNIT(S): at 17:20

## 2022-12-25 RX ADMIN — CEFEPIME 100 MILLIGRAM(S): 1 INJECTION, POWDER, FOR SOLUTION INTRAMUSCULAR; INTRAVENOUS at 13:45

## 2022-12-25 RX ADMIN — CLOPIDOGREL BISULFATE 75 MILLIGRAM(S): 75 TABLET, FILM COATED ORAL at 12:14

## 2022-12-25 RX ADMIN — TAMSULOSIN HYDROCHLORIDE 0.8 MILLIGRAM(S): 0.4 CAPSULE ORAL at 22:33

## 2022-12-25 RX ADMIN — Medication 81 MILLIGRAM(S): at 12:15

## 2022-12-25 RX ADMIN — ATORVASTATIN CALCIUM 80 MILLIGRAM(S): 80 TABLET, FILM COATED ORAL at 22:33

## 2022-12-25 RX ADMIN — CHLORHEXIDINE GLUCONATE 1 APPLICATION(S): 213 SOLUTION TOPICAL at 06:33

## 2022-12-25 NOTE — CONSULT NOTE ADULT - ASSESSMENT
58 yo male with CAD and a CVA (no residual deficits) admitted for work-up of bradycardia, found to have a Mobitz Type II AV block with an incidental finding of mediastinal and supraclavicular lymphadenopathy showing thyroid hyperplasia. Oncology consulted for work-up of lymphadenopathy, with concern for possible malignancy.    #Lymphadenopathy  #Thyroid Hyperplasia  - CT Scan showing: Mediastinal and supraclavicular lymph nodes, an upper tracheal lymph node with calcification that measures 3.1   x 2.9 cm, a left supraclavicular lymph node that measures 4.1 x 2.9 cm and a subcarinal lymph node that measures 2.3 x 1.9 cm.  - Cytopathology report showing Thyroid tissue with follicular architecture, no lymph node elements seen  - Heterogeneous mass along the left lower pole, measuring 3.4 x 2.6 x 3.4 cm, possibly exophytic thyroid nodule.Additional right TI-RAD nodules, measuring up to 2.4 cm (TI-RAD 3).TI-RAD 3: Mildly suspicious (FNA if > 2.5 cm, Follow if > 1.5 cm)  - Endocrinology following for DM. Appreciate additional recommendations regarding thyroid hyperplasia.   - Patient will need a PET Scan for further work up of LAD as well as a repeat biopsy of the most FDG avid lesion. This workup can be deferred to outpatient. Patient should follow up with Endocrinology as the majority of thyroid cancers are managed by Endocrinologists and not medical oncology.    Thank you for the opportunity to participate in the care of this patient. Case d/w Dr. Johnson.    Chela Lane M.D.  Hematology and Medical Oncology Fellow  Pager: [phone]  For weekends and evenings (5 pm - 8 am), please page Heme/Onc fellow on call. 58 yo male with CAD and a CVA (no residual deficits) admitted for work-up of bradycardia, found to have a Mobitz Type II AV block with an incidental finding of mediastinal and supraclavicular lymphadenopathy showing thyroid hyperplasia. Oncology consulted for work-up of lymphadenopathy, with concern for possible malignancy.    #Lymphadenopathy  #Thyroid Hyperplasia  - CT Scan showing: Mediastinal and supraclavicular lymph nodes, an upper tracheal lymph node with calcification that measures 3.1   x 2.9 cm, a left supraclavicular lymph node that measures 4.1 x 2.9 cm and a subcarinal lymph node that measures 2.3 x 1.9 cm.  - Cytopathology report showing Thyroid tissue with follicular architecture, no lymph node elements seen  - Heterogeneous mass along the left lower pole, measuring 3.4 x 2.6 x 3.4 cm, possibly exophytic thyroid nodule.Additional right TI-RAD nodules, measuring up to 2.4 cm (TI-RAD 3).TI-RAD 3: Mildly suspicious (FNA if > 2.5 cm, Follow if > 1.5 cm)  - Endocrinology following for DM. Appreciate additional recommendations regarding thyroid hyperplasia.   - Patient will need a PET Scan for further work up of LAD as well as a repeat biopsy of the most FDG avid lesion. This workup can be deferred to outpatient. Patient should follow up with Endocrinology as the majority of thyroid cancers are managed by Endocrinologists and not medical oncology.    Thank you for the opportunity to participate in the care of this patient. Case d/w Dr. Johnson.    Chela Lane M.D.  Hematology and Medical Oncology Fellow  Pager: [phone]  For weekends and evenings (5 pm - 8 am), please page Heme/Onc fellow on call.

## 2022-12-25 NOTE — PROGRESS NOTE ADULT - ATTENDING COMMENTS
57 y.o male h/of CAD, CVA (w/o residual weakness), DM, p/w bradycardia. EKG concerning for 3:1, Mobitz type II AV block. CT chest: Mediastinal and supraclavicular lymph nodes. ICD placed on 12/21. c/b fever: infectious work up underway .     - Preliminary pathology reports of L supraclavicular LN positive for follicular thyroid tissue  - per d/w pathologist and IR plan to repeat L supraclavicular LN biopsy Tuesday @4PM pt and family in agreement   - C/w Zosyn and Vancomycin, monitor AICD site closely for infection, currently appears clean, does not appear to be the source of infection   - MRSA neg; d/c Vancomycin   - Highly recommend outpatient PET/CT scan   - Close outpt f/u w/ cards and rheum  - ID & EP following.

## 2022-12-25 NOTE — PROGRESS NOTE ADULT - SUBJECTIVE AND OBJECTIVE BOX
INTERVAL HPI/OVERNIGHT EVENTS:  Pt seen and examined at bedside. No acute overnight events or complaints.    VITAL SIGNS:  T(F): 99.2 (12-25-22 @ 06:14)  HR: 94 (12-25-22 @ 06:14)  BP: 159/83 (12-25-22 @ 06:14)  RR: 18 (12-25-22 @ 06:14)  SpO2: 97% (12-25-22 @ 06:14)  Wt(kg): --    PHYSICAL EXAM:    Constitutional: WDWN, NAD  HEENT: PERRL, EOMI, sclera non-icteric, neck supple, trachea midline, no masses, no JVD, MMM, good dentition  Respiratory: CTA b/l, good air entry b/l, no wheezing, no rhonchi, no rales, without accessory muscle use and no intercostal retractions  Cardiovascular: RRR, normal S1S2, no M/R/G  Gastrointestinal: soft, NTND, no masses palpable, BS normal  Extremities: Warm, well perfused, pulses equal bilateral upper and lower extremities, no edema, no clubbing. Capillary refill <2 sec  Neurological: AAOx3, CN Grossly intact  Skin: Normal temperature, warm, dry    MEDICATIONS  (STANDING):  amLODIPine   Tablet 10 milliGRAM(s) Oral daily  aspirin enteric coated 81 milliGRAM(s) Oral daily  atorvastatin 80 milliGRAM(s) Oral at bedtime  cefepime   IVPB      cefepime   IVPB 1000 milliGRAM(s) IV Intermittent every 8 hours  chlorhexidine 2% Cloths 1 Application(s) Topical <User Schedule>  clopidogrel Tablet 75 milliGRAM(s) Oral daily  dextrose 50% Injectable 25 Gram(s) IV Push once  dextrose 50% Injectable 12.5 Gram(s) IV Push once  dextrose 50% Injectable 25 Gram(s) IV Push once  dextrose Oral Gel 15 Gram(s) Oral once  finasteride 5 milliGRAM(s) Oral daily  glucagon  Injectable 1 milliGRAM(s) IntraMuscular once  influenza   Vaccine 0.5 milliLiter(s) IntraMuscular once  insulin glargine Injectable (LANTUS) 24 Unit(s) SubCutaneous at bedtime  insulin lispro (ADMELOG) corrective regimen sliding scale   SubCutaneous three times a day before meals  insulin lispro (ADMELOG) corrective regimen sliding scale   SubCutaneous at bedtime  insulin lispro Injectable (ADMELOG) 8 Unit(s) SubCutaneous three times a day before meals  lactated ringers 1000 milliLiter(s) (250 mL/Hr) IV Continuous <Continuous>  potassium chloride   Solution 40 milliEquivalent(s) Oral once  tamsulosin 0.8 milliGRAM(s) Oral at bedtime  vancomycin  IVPB 1250 milliGRAM(s) IV Intermittent every 12 hours    MEDICATIONS  (PRN):  acetaminophen     Tablet .. 650 milliGRAM(s) Oral every 6 hours PRN Temp greater or equal to 38C (100.4F), Mild Pain (1 - 3)  aluminum hydroxide/magnesium hydroxide/simethicone Suspension 30 milliLiter(s) Oral every 4 hours PRN Dyspepsia  atropine Injectable 1 milliGRAM(s) IV Push once PRN symptomatic bradycardia  melatonin 3 milliGRAM(s) Oral at bedtime PRN Insomnia  ondansetron Injectable 4 milliGRAM(s) IV Push every 8 hours PRN Nausea and/or Vomiting      Allergies    No Known Allergies    Intolerances        LABS:                        12.3   8.40  )-----------( 148      ( 25 Dec 2022 06:34 )             37.4     12-25    139  |  107  |  18  ----------------------------<  151<H>  3.8   |  21<L>  |  1.13    Ca    8.3<L>      25 Dec 2022 06:34  Phos  1.7     12-25  Mg     2.0     12-25    TPro  6.4  /  Alb  3.3  /  TBili  0.8  /  DBili  x   /  AST  23  /  ALT  21  /  AlkPhos  64  12-25          RADIOLOGY & ADDITIONAL TESTS:  Reviewed      ******************  Authored By: Mayra Burks MD PGY1  Internal Medicine  Pager: [phone] [facility]// [phone] [facility]  MS Teams Preferred  ******************   INTERVAL HPI/OVERNIGHT EVENTS:  Pt seen and examined at bedside. afebrile overnight.     VITAL SIGNS:  T(F): 99.2 (12-25-22 @ 06:14)  HR: 94 (12-25-22 @ 06:14)  BP: 159/83 (12-25-22 @ 06:14)  RR: 18 (12-25-22 @ 06:14)  SpO2: 97% (12-25-22 @ 06:14)  Wt(kg): --    PHYSICAL EXAM:    Constitutional: NAD  HEENT: PERRL, EOMI, sclera non-icteric, neck supple  Respiratory: CTA b/l, good air entry b/l, no wheezing, no rhonchi, no rales  Cardiovascular: RRR, normal S1S2, no M/R/G  Gastrointestinal: soft, NTND, no masses palpable, BS normal  Extremities: Warm, well perfused, pulses equal bilateral upper and lower extremities, no edema, no clubbing. Capillary refill <2 sec  Neurological: AAOx3, CN Grossly intact  Skin: erythematous, tender upon palpation over ICD site     MEDICATIONS  (STANDING):  amLODIPine   Tablet 10 milliGRAM(s) Oral daily  aspirin enteric coated 81 milliGRAM(s) Oral daily  atorvastatin 80 milliGRAM(s) Oral at bedtime  cefepime   IVPB      cefepime   IVPB 1000 milliGRAM(s) IV Intermittent every 8 hours  chlorhexidine 2% Cloths 1 Application(s) Topical <User Schedule>  clopidogrel Tablet 75 milliGRAM(s) Oral daily  dextrose 50% Injectable 25 Gram(s) IV Push once  dextrose 50% Injectable 12.5 Gram(s) IV Push once  dextrose 50% Injectable 25 Gram(s) IV Push once  dextrose Oral Gel 15 Gram(s) Oral once  finasteride 5 milliGRAM(s) Oral daily  glucagon  Injectable 1 milliGRAM(s) IntraMuscular once  influenza   Vaccine 0.5 milliLiter(s) IntraMuscular once  insulin glargine Injectable (LANTUS) 24 Unit(s) SubCutaneous at bedtime  insulin lispro (ADMELOG) corrective regimen sliding scale   SubCutaneous three times a day before meals  insulin lispro (ADMELOG) corrective regimen sliding scale   SubCutaneous at bedtime  insulin lispro Injectable (ADMELOG) 8 Unit(s) SubCutaneous three times a day before meals  lactated ringers 1000 milliLiter(s) (250 mL/Hr) IV Continuous <Continuous>  potassium chloride   Solution 40 milliEquivalent(s) Oral once  tamsulosin 0.8 milliGRAM(s) Oral at bedtime  vancomycin  IVPB 1250 milliGRAM(s) IV Intermittent every 12 hours    MEDICATIONS  (PRN):  acetaminophen     Tablet .. 650 milliGRAM(s) Oral every 6 hours PRN Temp greater or equal to 38C (100.4F), Mild Pain (1 - 3)  aluminum hydroxide/magnesium hydroxide/simethicone Suspension 30 milliLiter(s) Oral every 4 hours PRN Dyspepsia  atropine Injectable 1 milliGRAM(s) IV Push once PRN symptomatic bradycardia  melatonin 3 milliGRAM(s) Oral at bedtime PRN Insomnia  ondansetron Injectable 4 milliGRAM(s) IV Push every 8 hours PRN Nausea and/or Vomiting      Allergies    No Known Allergies    Intolerances        LABS:                        12.3   8.40  )-----------( 148      ( 25 Dec 2022 06:34 )             37.4     12-25    139  |  107  |  18  ----------------------------<  151<H>  3.8   |  21<L>  |  1.13    Ca    8.3<L>      25 Dec 2022 06:34  Phos  1.7     12-25  Mg     2.0     12-25    TPro  6.4  /  Alb  3.3  /  TBili  0.8  /  DBili  x   /  AST  23  /  ALT  21  /  AlkPhos  64  12-25          RADIOLOGY & ADDITIONAL TESTS:  Reviewed      ******************  Authored By: Mayra Burks MD PGY1  Internal Medicine  Pager: [phone] [facility]// [phone] [facility]  MS Teams Preferred  ******************

## 2022-12-25 NOTE — PROGRESS NOTE ADULT - PROBLEM SELECTOR PLAN 2
-EKG showed 3:1, Mobitz type II AV block. Pt was given Atropine x3, with HRs remaining in the 40s.  -no prior history   -TSH wnl  -TTE  -ep following, appreciate recs  -CT chest noncon: mediastinal LAD  - s/p ICD implant ([employer]tronic) 12/21/22  - tenderness/erythema over the ICD implant area

## 2022-12-25 NOTE — PROGRESS NOTE ADULT - SUBJECTIVE AND OBJECTIVE BOX
24H hour events: Stable, recurrent fever last night    MEDICATIONS:  amLODIPine   Tablet 10 milliGRAM(s) Oral daily  aspirin enteric coated 81 milliGRAM(s) Oral daily  clopidogrel Tablet 75 milliGRAM(s) Oral daily  cefepime   IVPB      cefepime   IVPB 1000 milliGRAM(s) IV Intermittent every 8 hours  acetaminophen     Tablet .. 650 milliGRAM(s) Oral every 6 hours PRN  melatonin 3 milliGRAM(s) Oral at bedtime PRN  ondansetron Injectable 4 milliGRAM(s) IV Push every 8 hours PRN  aluminum hydroxide/magnesium hydroxide/simethicone Suspension 30 milliLiter(s) Oral every 4 hours PRN  atropine Injectable 1 milliGRAM(s) IV Push once PRN  atorvastatin 80 milliGRAM(s) Oral at bedtime  finasteride 5 milliGRAM(s) Oral daily  glucagon  Injectable 1 milliGRAM(s) IntraMuscular once  insulin glargine Injectable (LANTUS) 24 Unit(s) SubCutaneous at bedtime  insulin lispro (ADMELOG) corrective regimen sliding scale   SubCutaneous three times a day before meals  insulin lispro (ADMELOG) corrective regimen sliding scale   SubCutaneous at bedtime  insulin lispro Injectable (ADMELOG) 8 Unit(s) SubCutaneous three times a day before meals  chlorhexidine 2% Cloths 1 Application(s) Topical <User Schedule>  influenza   Vaccine 0.5 milliLiter(s) IntraMuscular once  lactated ringers 1000 milliLiter(s) IV Continuous <Continuous>  potassium chloride   Solution 40 milliEquivalent(s) Oral once  tamsulosin 0.8 milliGRAM(s) Oral at bedtime    PHYSICAL EXAM:  T(C): 36.8 (12-25-22 @ 12:33), Max: 37.8 (12-24-22 @ 22:00)  HR: 93 (12-25-22 @ 12:33) (88 - 94)  BP: 145/89 (12-25-22 @ 12:33) (145/89 - 164/89)  RR: 18 (12-25-22 @ 12:33) (17 - 18)  SpO2: 98% (12-25-22 @ 12:33) (97% - 98%)  Wt(kg): --  I&O's Summary    24 Dec 2022 07:01  -  25 Dec 2022 07:00  --------------------------------------------------------  IN: 180 mL / OUT: 0 mL / NET: 180 mL    ICD site looks good with out signs of infection    LABS:	 	    CBC Full  -  ( 25 Dec 2022 06:34 )  WBC Count : 8.40 K/uL  Hemoglobin : 12.3 g/dL  Hematocrit : 37.4 %  Platelet Count - Automated : 148 K/uL  Mean Cell Volume : 78.7 fl  Mean Cell Hemoglobin : 25.9 pg  Mean Cell Hemoglobin Concentration : 32.9 gm/dL  Auto Neutrophil # : 6.34 K/uL  Auto Lymphocyte # : 1.11 K/uL  Auto Monocyte # : 0.75 K/uL  Auto Eosinophil # : 0.15 K/uL  Auto Basophil # : 0.02 K/uL  Auto Neutrophil % : 75.5 %  Auto Lymphocyte % : 13.2 %  Auto Monocyte % : 8.9 %  Auto Eosinophil % : 1.8 %  Auto Basophil % : 0.2 %    12-25    139  |  107  |  18  ----------------------------<  151<H>  3.8   |  21<L>  |  1.13  12-24    138  |  105  |  16  ----------------------------<  145<H>  3.3<L>   |  21<L>  |  1.10    Ca    8.3<L>      25 Dec 2022 06:34  Ca    8.5      24 Dec 2022 08:45  Phos  1.7     12-25  Phos  2.0     12-24  Mg     2.0     12-25  Mg     1.9     12-24    TPro  6.4  /  Alb  3.3  /  TBili  0.8  /  DBili  x   /  AST  23  /  ALT  21  /  AlkPhos  64  12-25  TPro  6.2  /  Alb  3.6  /  TBili  0.9  /  DBili  x   /  AST  19  /  ALT  16  /  AlkPhos  60  12-24    TELEMETRY: sinus rhythm 	    	  ASSESSMENT/PLAN:   Stable overnight  Awaiting repeat Bx Tuesday  will also need a PET-CT

## 2022-12-25 NOTE — CONSULT NOTE ADULT - CONSULT REQUESTED DATE/TIME
21-Dec-2022 17:54
20-Dec-2022 01:01
20-Dec-2022 06:50
23-Dec-2022 14:42
25-Dec-2022 14:33
23-Dec-2022 15:08

## 2022-12-25 NOTE — CONSULT NOTE ADULT - ATTENDING COMMENTS
Patient discussed with Dr. Santoro. Agree with assessment and plan as above
56 yo male with CAD and a CVA (no residual deficits) admitted for work-up of bradycardia, found to have a Mobitz Type II AV block with an incidental finding of mediastinal and supraclavicular lymphadenopathy with biopsy showing thyroid hyperplasia. Oncology consulted for work-up of lymphadenopathy, with concern for possible malignancy. Recommend PET scan to be done as an outpatient and biopsy of most FDG avid site to determine if malignancy present. Should follow up with endocrinology regarding management.
Briefly   57 year-old right-handed man with HTN, HLD, NIDDM2, prior stroke in 2012 (with residual left sided ?numbness) who initially presented to [facility] on 12/19/22 with c/o generalized weakness, dizziness and vomiting noted upon waking up in the AM, was found to be bradycardic to the 30s-40s with EKG concerning for 3:1 Mobitz type II AV block, transferred to [facility] for further cardiac evaluation and management. neuro called for stroke management and CT findings   On exam patient reported minimally decreased sensation to pinprick in the LUE/LLE, but otherwise no localizing deficits were noted.   premrs 1   NIHSS: 1    CTH chronic appearing R cerebellar infarct   CTA H/N severe narrowing at the origin of the left vertebral artery and mild to moderate narrowing of both vertebral artery V2 segments, while CTA head was reported to show complete occlusion of the right distal V3 segment, multifocal occlusions with segmental opacification of the right V4 segment extending to the basilar confluence, and multifocal severe narrowing of the left V4 segment,    Impression: Acute onset dizziness/lightheadedness, nausea, vomiting, likely multifactorial --> severe bradycardia from Mobitz type II AV block, does also have chronic appearing cerebellar infarct . Will evaluate for superimposed acute ischemic cerebrovascular event given possibility of hypoperfusion from persistent bradycardia in the setting of severe intracranial atherosclerotic disease of the posterior circulation.       Recommendations:   - MRI brain w/o contrast  - MRA NOVA  - Continue home aspirin 325mg daily  - Start plavix 75mg daily for now, can stop if MRI (-) for acute infarct  - Continue home atorvastatin 80mg   - Workup and management of Mobitz type II AV block as per EP/Cardiology  - Hemoglobin A1c and lipid panel  - TTE  - telemetry  - PT/OT/SS/SLP, OOBC  - avoid hypotension  - check orthostatics   - check FS, glucose control <180  - GI/DVT ppx  - Counseling on diet, exercise, and medication adherence was done  - Counseling on smoking cessation and alcohol consumption offered when appropriate.  - Pain assessed and judicious use of narcotics when appropriate was discussed.    - Stroke education given when appropriate.  - Importance of fall prevention discussed.   - Differential diagnosis and plan of care discussed with patient and/or family and primary team  - Thank you for allowing me to participate in the care of this patient. Call with questions.   Abelardo Irving MD  Vascular Neurology  Office: [phone]
Patient seen in ED. History obtained from the patient and his wife (who was at the bedside). The patient presented with symptomatic 3:1 AV block. TSH wnl. No ischemic symptoms, two Vilma negative. The patient can not get a cMRI due to metal in his arm. TTE performed - LVEF: 57%, normal RVSF, normal diastolic function. Consideration given to cardiac sarcoid. CT chest preliminary read with bulky adenopathy in the neck and additional hilar and mediastinal nodes. The patient denies prior occular symptoms or rashes. Based on this CT read I have a high clinical suspicion for sarcoidosis. Keep NPO for ICD tomorrow. Will follow-up official read of CT and discuss with pulmonary results of CT prior to implanting the ICD as he would be well suited for a 2-ch PPM if he does not have sarcoid.    MAIDA Foote
Agree with assessment and plan as above by Dr. Pelaez. Reviewed all pertinent labs, glucose values, and imaging studies. Modifications made as indicated above. Pt. with Type 2 DM relatively well controlled at home now with hyperglycemia admitted for r/o sarcoid with heart block w/ bradycardia. Adjust basal bolus as above. Plan to d/c on home regimen minus sulfonylurea.     Ender Manuel D.O  [phone]

## 2022-12-25 NOTE — CONSULT NOTE ADULT - CONSULT REASON
Thyroid Hyperplasia with concern for possible thyroid malignancy
evaluate for sarcoid
fevers
Bradycardia
CTA neck with severe narrowing at the origin of the left vertebral artery and mild to moderate narrowing of both vertebral artery V2 segments; CTA head with complete occlusion of the right distal V3 segment, multifocal occlusions with segmental opacification of the right V4 segment extending to the basilar confluence, and multifocal severe narrowing of the left V4 segment.
Uncontrolled T2DM

## 2022-12-25 NOTE — PROGRESS NOTE ADULT - PROBLEM SELECTOR PLAN 3
-LAD seen on CT noncontrast   -pulm c/s, possible biopsy through ebus?  -rheum c/s: outpt f/u w/ biopsy result and lab work  -outpatient PETScan r/o Sarcoidosis (ordered in [employer]), will schedule as outpatient  -follicular thyroid tissue seen in biopsy sample  -will require repeat biopsy of supraclavicular, scheduled for 12/27  -NPO at midnight 12/27 and hold AC morning of  -if pt leaves before then, will require pcp to write a script for IR biopsy, insurance authorization, full set of labs before scheduling procedure (call [phone] to make appt)  -U/s of thyroid: Heterogeneous mass along the left lower pole, measuring 3.4 x 2.6 x 3.4 cm, possibly exophytic thyroid nodule.

## 2022-12-25 NOTE — CONSULT NOTE ADULT - SUBJECTIVE AND OBJECTIVE BOX
HEMATOLOGY ONCOLOGY CONSULT     Patient is a 57y old  Male who presents with a chief complaint of bradycardia (25 Dec 2022 07:20)      HPI:  Patient is a 57 y M with a PMHx of CAD, CVA (2012 with no significant residual weakness), DM, p/w bradycardia. The day prior to admission, pt reports feeling dizzy, off-balance, unable to walk. He also experienced nausea and had some episodes of vomiting. He went [facility] where he was noted to be bradycardic, was transferred here. Upon presentation, HR in the 30s. EKG concerning for 3:1, Mobitz type II AV block. Pt was given Atropine x3, with HRs remaining in the 40s. Pt denies chest pain, dyspnea, abdominal pain. Pt does not regularly follow up with a cardiologist but had seen one (for the first time), 2 months after being discharged from the ED of an OSH for abdominal pain. Per patient report, stress test was normal.      Pt denies sick contacts and recent travel. (20 Dec 2022 09:50)       ROS:  Negative except for:    PAST MEDICAL & SURGICAL HISTORY:  CVA (cerebrovascular accident)      HTN (hypertension)      DM (diabetes mellitus)      HTN (hypertension)          SOCIAL HISTORY:    FAMILY HISTORY:      MEDICATIONS  (STANDING):  amLODIPine   Tablet 10 milliGRAM(s) Oral daily  aspirin enteric coated 81 milliGRAM(s) Oral daily  atorvastatin 80 milliGRAM(s) Oral at bedtime  cefepime   IVPB      cefepime   IVPB 1000 milliGRAM(s) IV Intermittent every 8 hours  chlorhexidine 2% Cloths 1 Application(s) Topical <User Schedule>  clopidogrel Tablet 75 milliGRAM(s) Oral daily  dextrose 50% Injectable 25 Gram(s) IV Push once  dextrose 50% Injectable 12.5 Gram(s) IV Push once  dextrose 50% Injectable 25 Gram(s) IV Push once  dextrose Oral Gel 15 Gram(s) Oral once  finasteride 5 milliGRAM(s) Oral daily  glucagon  Injectable 1 milliGRAM(s) IntraMuscular once  influenza   Vaccine 0.5 milliLiter(s) IntraMuscular once  insulin glargine Injectable (LANTUS) 24 Unit(s) SubCutaneous at bedtime  insulin lispro (ADMELOG) corrective regimen sliding scale   SubCutaneous three times a day before meals  insulin lispro (ADMELOG) corrective regimen sliding scale   SubCutaneous at bedtime  insulin lispro Injectable (ADMELOG) 8 Unit(s) SubCutaneous three times a day before meals  lactated ringers 1000 milliLiter(s) (250 mL/Hr) IV Continuous <Continuous>  potassium chloride   Solution 40 milliEquivalent(s) Oral once  tamsulosin 0.8 milliGRAM(s) Oral at bedtime    MEDICATIONS  (PRN):  acetaminophen     Tablet .. 650 milliGRAM(s) Oral every 6 hours PRN Temp greater or equal to 38C (100.4F), Mild Pain (1 - 3)  aluminum hydroxide/magnesium hydroxide/simethicone Suspension 30 milliLiter(s) Oral every 4 hours PRN Dyspepsia  atropine Injectable 1 milliGRAM(s) IV Push once PRN symptomatic bradycardia  melatonin 3 milliGRAM(s) Oral at bedtime PRN Insomnia  ondansetron Injectable 4 milliGRAM(s) IV Push every 8 hours PRN Nausea and/or Vomiting      Allergies    No Known Allergies    Intolerances        Vital Signs Last 24 Hrs  T(C): 36.8 (25 Dec 2022 12:33), Max: 37.8 (24 Dec 2022 22:00)  T(F): 98.2 (25 Dec 2022 12:33), Max: 100.1 (24 Dec 2022 22:00)  HR: 93 (25 Dec 2022 12:33) (88 - 94)  BP: 145/89 (25 Dec 2022 12:33) (145/89 - 164/89)  BP(mean): 114 (24 Dec 2022 17:20) (114 - 114)  RR: 18 (25 Dec 2022 12:33) (17 - 18)  SpO2: 98% (25 Dec 2022 12:33) (97% - 98%)    Parameters below as of 25 Dec 2022 12:33  Patient On (Oxygen Delivery Method): room air        PHYSICAL EXAM  General: adult in NAD  HEENT: clear oropharynx, anicteric sclera, pink conjunctiva  Neck: supple  CV: normal S1/S2 with no murmur rubs or gallops  Lungs: positive air movement b/l ant lungs,clear to auscultation, no wheezes, no rales  Abdomen: soft non-tender non-distended, no hepatosplenomegaly  Ext: no clubbing cyanosis or edema  Skin: no rashes and no petechiae  Neuro: alert and oriented X 4, no focal deficits      12-24-22 @ 07:01  -  12-25-22 @ 07:00  --------------------------------------------------------  IN: 180 mL / OUT: 0 mL / NET: 180 mL      LABS:                          12.3   8.40  )-----------( 148      ( 25 Dec 2022 06:34 )             37.4         Mean Cell Volume : 78.7 fl  Mean Cell Hemoglobin : 25.9 pg  Mean Cell Hemoglobin Concentration : 32.9 gm/dL  Auto Neutrophil # : 6.34 K/uL  Auto Lymphocyte # : 1.11 K/uL  Auto Monocyte # : 0.75 K/uL  Auto Eosinophil # : 0.15 K/uL  Auto Basophil # : 0.02 K/uL  Auto Neutrophil % : 75.5 %  Auto Lymphocyte % : 13.2 %  Auto Monocyte % : 8.9 %  Auto Eosinophil % : 1.8 %  Auto Basophil % : 0.2 %      12-25    139  |  107  |  18  ----------------------------<  151<H>  3.8   |  21<L>  |  1.13    Ca    8.3<L>      25 Dec 2022 06:34  Phos  1.7     12-25  Mg     2.0     12-25    TPro  6.4  /  Alb  3.3  /  TBili  0.8  /  DBili  x   /  AST  23  /  ALT  21  /  AlkPhos  64  12-25                      BLOOD SMEAR INTERPRETATION:       RADIOLOGY & ADDITIONAL STUDIES:

## 2022-12-26 PROBLEM — Z00.00 ENCOUNTER FOR PREVENTIVE HEALTH EXAMINATION: Noted: 2022-12-26

## 2022-12-26 LAB
ALBUMIN SERPL ELPH-MCNC: 3.4 G/DL — SIGNIFICANT CHANGE UP (ref 3.3–5)
ALP SERPL-CCNC: 76 U/L — SIGNIFICANT CHANGE UP (ref 40–120)
ALT FLD-CCNC: 37 U/L — SIGNIFICANT CHANGE UP (ref 10–45)
ANION GAP SERPL CALC-SCNC: 13 MMOL/L — SIGNIFICANT CHANGE UP (ref 5–17)
AST SERPL-CCNC: 34 U/L — SIGNIFICANT CHANGE UP (ref 10–40)
BASOPHILS # BLD AUTO: 0.03 K/UL — SIGNIFICANT CHANGE UP (ref 0–0.2)
BASOPHILS NFR BLD AUTO: 0.4 % — SIGNIFICANT CHANGE UP (ref 0–2)
BILIRUB SERPL-MCNC: 0.8 MG/DL — SIGNIFICANT CHANGE UP (ref 0.2–1.2)
BUN SERPL-MCNC: 18 MG/DL — SIGNIFICANT CHANGE UP (ref 7–23)
CALCIUM SERPL-MCNC: 8.6 MG/DL — SIGNIFICANT CHANGE UP (ref 8.4–10.5)
CHLORIDE SERPL-SCNC: 107 MMOL/L — SIGNIFICANT CHANGE UP (ref 96–108)
CO2 SERPL-SCNC: 21 MMOL/L — LOW (ref 22–31)
CREAT SERPL-MCNC: 1.11 MG/DL — SIGNIFICANT CHANGE UP (ref 0.5–1.3)
EGFR: 78 ML/MIN/1.73M2 — SIGNIFICANT CHANGE UP
EOSINOPHIL # BLD AUTO: 0.11 K/UL — SIGNIFICANT CHANGE UP (ref 0–0.5)
EOSINOPHIL NFR BLD AUTO: 1.5 % — SIGNIFICANT CHANGE UP (ref 0–6)
GLUCOSE BLDC GLUCOMTR-MCNC: 111 MG/DL — HIGH (ref 70–99)
GLUCOSE BLDC GLUCOMTR-MCNC: 146 MG/DL — HIGH (ref 70–99)
GLUCOSE BLDC GLUCOMTR-MCNC: 157 MG/DL — HIGH (ref 70–99)
GLUCOSE BLDC GLUCOMTR-MCNC: 225 MG/DL — HIGH (ref 70–99)
GLUCOSE SERPL-MCNC: 104 MG/DL — HIGH (ref 70–99)
HCT VFR BLD CALC: 37.4 % — LOW (ref 39–50)
HGB BLD-MCNC: 12.1 G/DL — LOW (ref 13–17)
IMM GRANULOCYTES NFR BLD AUTO: 0.4 % — SIGNIFICANT CHANGE UP (ref 0–0.9)
LYMPHOCYTES # BLD AUTO: 1.14 K/UL — SIGNIFICANT CHANGE UP (ref 1–3.3)
LYMPHOCYTES # BLD AUTO: 15.8 % — SIGNIFICANT CHANGE UP (ref 13–44)
MAGNESIUM SERPL-MCNC: 2.1 MG/DL — SIGNIFICANT CHANGE UP (ref 1.6–2.6)
MCHC RBC-ENTMCNC: 25.6 PG — LOW (ref 27–34)
MCHC RBC-ENTMCNC: 32.4 GM/DL — SIGNIFICANT CHANGE UP (ref 32–36)
MCV RBC AUTO: 79.2 FL — LOW (ref 80–100)
MONOCYTES # BLD AUTO: 0.77 K/UL — SIGNIFICANT CHANGE UP (ref 0–0.9)
MONOCYTES NFR BLD AUTO: 10.7 % — SIGNIFICANT CHANGE UP (ref 2–14)
NEUTROPHILS # BLD AUTO: 5.13 K/UL — SIGNIFICANT CHANGE UP (ref 1.8–7.4)
NEUTROPHILS NFR BLD AUTO: 71.2 % — SIGNIFICANT CHANGE UP (ref 43–77)
NRBC # BLD: 0 /100 WBCS — SIGNIFICANT CHANGE UP (ref 0–0)
PHOSPHATE SERPL-MCNC: 2.5 MG/DL — SIGNIFICANT CHANGE UP (ref 2.5–4.5)
PLATELET # BLD AUTO: 170 K/UL — SIGNIFICANT CHANGE UP (ref 150–400)
POTASSIUM SERPL-MCNC: 3.5 MMOL/L — SIGNIFICANT CHANGE UP (ref 3.5–5.3)
POTASSIUM SERPL-SCNC: 3.5 MMOL/L — SIGNIFICANT CHANGE UP (ref 3.5–5.3)
PROT SERPL-MCNC: 6.2 G/DL — SIGNIFICANT CHANGE UP (ref 6–8.3)
RBC # BLD: 4.72 M/UL — SIGNIFICANT CHANGE UP (ref 4.2–5.8)
RBC # FLD: 14.6 % — HIGH (ref 10.3–14.5)
SODIUM SERPL-SCNC: 141 MMOL/L — SIGNIFICANT CHANGE UP (ref 135–145)
WBC # BLD: 7.21 K/UL — SIGNIFICANT CHANGE UP (ref 3.8–10.5)
WBC # FLD AUTO: 7.21 K/UL — SIGNIFICANT CHANGE UP (ref 3.8–10.5)

## 2022-12-26 PROCEDURE — 99233 SBSQ HOSP IP/OBS HIGH 50: CPT

## 2022-12-26 RX ADMIN — TAMSULOSIN HYDROCHLORIDE 0.8 MILLIGRAM(S): 0.4 CAPSULE ORAL at 21:16

## 2022-12-26 RX ADMIN — CEFEPIME 100 MILLIGRAM(S): 1 INJECTION, POWDER, FOR SOLUTION INTRAMUSCULAR; INTRAVENOUS at 21:18

## 2022-12-26 RX ADMIN — AMLODIPINE BESYLATE 10 MILLIGRAM(S): 2.5 TABLET ORAL at 07:09

## 2022-12-26 RX ADMIN — INSULIN GLARGINE 24 UNIT(S): 100 INJECTION, SOLUTION SUBCUTANEOUS at 21:17

## 2022-12-26 RX ADMIN — Medication 2: at 16:51

## 2022-12-26 RX ADMIN — Medication 81 MILLIGRAM(S): at 11:12

## 2022-12-26 RX ADMIN — Medication 8 UNIT(S): at 08:07

## 2022-12-26 RX ADMIN — Medication 8 UNIT(S): at 16:51

## 2022-12-26 RX ADMIN — ATORVASTATIN CALCIUM 80 MILLIGRAM(S): 80 TABLET, FILM COATED ORAL at 21:16

## 2022-12-26 RX ADMIN — Medication 8 UNIT(S): at 12:17

## 2022-12-26 RX ADMIN — CEFEPIME 100 MILLIGRAM(S): 1 INJECTION, POWDER, FOR SOLUTION INTRAMUSCULAR; INTRAVENOUS at 13:11

## 2022-12-26 RX ADMIN — FINASTERIDE 5 MILLIGRAM(S): 5 TABLET, FILM COATED ORAL at 11:12

## 2022-12-26 RX ADMIN — CEFEPIME 100 MILLIGRAM(S): 1 INJECTION, POWDER, FOR SOLUTION INTRAMUSCULAR; INTRAVENOUS at 07:09

## 2022-12-26 NOTE — PROGRESS NOTE ADULT - SUBJECTIVE AND OBJECTIVE BOX
Dot Muse, PGY1      Patient is a 57y old  Male who presents with a chief complaint of bradycardia (25 Dec 2022 14:34)      SUBJECTIVE/INTERVAL EVENTS: Patient seen and examined at bedside. Accompanied by wife. Patient denies chest pain, SOB, weakness, night sweats, fevers/chills, anxiety. Patient states that he understands he will likely be going in for repeat supraclavicular node biopsy tmrw with IR in the afternoon. Otherwise, he feels fine and reports improved appetite.     No acute events on telemetry overnight. Remained paced in sinus rhythm  overnight.      MEDICATIONS  (STANDING):  amLODIPine   Tablet 10 milliGRAM(s) Oral daily  aspirin enteric coated 81 milliGRAM(s) Oral daily  atorvastatin 80 milliGRAM(s) Oral at bedtime  cefepime   IVPB      cefepime   IVPB 1000 milliGRAM(s) IV Intermittent every 8 hours  chlorhexidine 2% Cloths 1 Application(s) Topical <User Schedule>  dextrose 50% Injectable 25 Gram(s) IV Push once  dextrose 50% Injectable 12.5 Gram(s) IV Push once  dextrose 50% Injectable 25 Gram(s) IV Push once  dextrose Oral Gel 15 Gram(s) Oral once  finasteride 5 milliGRAM(s) Oral daily  glucagon  Injectable 1 milliGRAM(s) IntraMuscular once  influenza   Vaccine 0.5 milliLiter(s) IntraMuscular once  insulin glargine Injectable (LANTUS) 24 Unit(s) SubCutaneous at bedtime  insulin lispro (ADMELOG) corrective regimen sliding scale   SubCutaneous three times a day before meals  insulin lispro (ADMELOG) corrective regimen sliding scale   SubCutaneous at bedtime  insulin lispro Injectable (ADMELOG) 8 Unit(s) SubCutaneous three times a day before meals  lactated ringers 1000 milliLiter(s) (250 mL/Hr) IV Continuous <Continuous>  potassium chloride   Solution 40 milliEquivalent(s) Oral once  tamsulosin 0.8 milliGRAM(s) Oral at bedtime    MEDICATIONS  (PRN):  acetaminophen     Tablet .. 650 milliGRAM(s) Oral every 6 hours PRN Temp greater or equal to 38C (100.4F), Mild Pain (1 - 3)  aluminum hydroxide/magnesium hydroxide/simethicone Suspension 30 milliLiter(s) Oral every 4 hours PRN Dyspepsia  atropine Injectable 1 milliGRAM(s) IV Push once PRN symptomatic bradycardia  melatonin 3 milliGRAM(s) Oral at bedtime PRN Insomnia  ondansetron Injectable 4 milliGRAM(s) IV Push every 8 hours PRN Nausea and/or Vomiting      VITAL SIGNS:  T(F): 98.3 (12-26-22 @ 06:18), Max: 99.6 (12-25-22 @ 21:34)  HR: 87 (12-26-22 @ 06:18) (86 - 93)  BP: 149/83 (12-26-22 @ 06:18) (137/80 - 149/83)  RR: 18 (12-26-22 @ 06:18) (18 - 18)  SpO2: 95% (12-26-22 @ 06:18) (95% - 98%)    I&O's Summary    Daily     Daily     PHYSICAL EXAM:  Gen: Alert, NAD  HEENT: NCAT, conjunctiva clear, sclera anicteric, no erythema or exudates in the oropharynx, mmm  Neck: Supple, no JVD  CV: RRR, S1S2, no m/r/g  Resp: CTAB, normal respiratory effort  Abd: Soft, nontender, nondistended, normal bowel sounds  Ext: no edema, no clubbing or cyanosis  Neuro: AOx3, no focal deficits  SKIN: warm, perfused    LABS:                        12.1   7.21  )-----------( 170      ( 26 Dec 2022 05:49 )             37.4     Hgb Trend: 12.1<--, 12.3<--, 12.8<--, 12.2<--, 13.9<--  12-26    141  |  107  |  18  ----------------------------<  104<H>  3.5   |  21<L>  |  1.11    Ca    8.6      26 Dec 2022 05:49  Phos  2.5     12-26  Mg     2.1     12-26    TPro  6.2  /  Alb  3.4  /  TBili  0.8  /  DBili  x   /  AST  34  /  ALT  37  /  AlkPhos  76  12-26    Creatinine Trend: 1.11<--, 1.13<--, 1.10<--, 1.24<--, 1.39<--, 1.35<--  LIVER FUNCTIONS - ( 26 Dec 2022 05:49 )  Alb: 3.4 g/dL / Pro: 6.2 g/dL / ALK PHOS: 76 U/L / ALT: 37 U/L / AST: 34 U/L / GGT: x             CAPILLARY BLOOD GLUCOSE      POCT Blood Glucose.: 111 mg/dL (26 Dec 2022 07:47)  POCT Blood Glucose.: 130 mg/dL (25 Dec 2022 22:20)  POCT Blood Glucose.: 150 mg/dL (25 Dec 2022 16:31)  POCT Blood Glucose.: 132 mg/dL (25 Dec 2022 12:00)      RADIOLOGY & ADDITIONAL TESTS: Reviewed    Imaging Personally Reviewed:    Consultant(s) Notes Reviewed:      Care Discussed with Consultants/Other Providers:

## 2022-12-26 NOTE — PROGRESS NOTE ADULT - PROBLEM SELECTOR PLAN 3
-LAD seen on CT noncontrast   -pulm c/s, possible biopsy through ebus?  -rheum c/s: outpt f/u w/ biopsy result and lab work  -outpatient PETScan r/o Sarcoidosis (ordered in [employer]), will schedule as outpatient  -follicular thyroid tissue seen in biopsy sample  -will require repeat biopsy of supraclavicular, scheduled for 12/27  -NPO at midnight 12/27 and hold AC morning of  -if pt leaves before then, will require pcp to write a script for IR biopsy, insurance authorization, full set of labs before scheduling procedure (call [phone] to make appt)  -U/s of thyroid: Heterogeneous mass along the left lower pole, measuring 3.4 x 2.6 x 3.4 cm, possibly exophytic thyroid nodule.

## 2022-12-26 NOTE — PROGRESS NOTE ADULT - ATTENDING COMMENTS
57 y.o male h/of CAD, CVA (w/o residual weakness), DM, p/w bradycardia. EKG concerning for 3:1, Mobitz type II AV block. CT chest: Mediastinal and supraclavicular lymph nodes. ICD placed on 12/21. c/b fever: infectious work up underway .     - Preliminary pathology reports of L supraclavicular LN positive for follicular thyroid tissue  - per d/w pathologist and IR plan to repeat L supraclavicular LN biopsy Tomorrow @4PM pt and family in agreement, NPO MN, holding Plavix per IR rec    - C/w Zosyn and Vancomycin, monitor AICD site closely for infection, currently appears clean, does not appear to be the source of infection   - MRSA neg; d/c'ed Vancomycin   - Highly recommend outpatient PET/CT scan   - Close outpt f/u w/ cards and rheum  - ID & EP following. 57 y.o male h/of CAD, CVA (w/o residual weakness), DM, p/w bradycardia. EKG concerning for 3:1, Mobitz type II AV block. CT chest: Mediastinal and supraclavicular lymph nodes. ICD placed on 12/21. c/b fever: infectious work up underway .     - Preliminary pathology reports of L supraclavicular LN positive for follicular thyroid tissue  - per d/w pathologist and IR plan to repeat L supraclavicular LN biopsy Tomorrow @4PM pt and family in agreement, NPO MN, holding Plavix per IR rec    - C/w cefepime: monitor AICD site closely for infection, currently appears clean, does not appear to be the source of infection   - MRSA neg; d/c'ed Vancomycin   - Highly recommend outpatient PET/CT scan   - Close outpt f/u w/ cards and rheum  - ID & EP following.

## 2022-12-27 ENCOUNTER — APPOINTMENT (OUTPATIENT)
Dept: RHEUMATOLOGY | Facility: CLINIC | Age: 57
End: 2022-12-27

## 2022-12-27 LAB
ANION GAP SERPL CALC-SCNC: 10 MMOL/L — SIGNIFICANT CHANGE UP (ref 5–17)
APTT BLD: 28.6 SEC — SIGNIFICANT CHANGE UP (ref 27.5–35.5)
B BURGDOR DNA SPEC QL NAA+PROBE: NEGATIVE — SIGNIFICANT CHANGE UP
BLD GP AB SCN SERPL QL: NEGATIVE — SIGNIFICANT CHANGE UP
BUN SERPL-MCNC: 19 MG/DL — SIGNIFICANT CHANGE UP (ref 7–23)
CALCIUM SERPL-MCNC: 8.7 MG/DL — SIGNIFICANT CHANGE UP (ref 8.4–10.5)
CHLORIDE SERPL-SCNC: 106 MMOL/L — SIGNIFICANT CHANGE UP (ref 96–108)
CO2 SERPL-SCNC: 22 MMOL/L — SIGNIFICANT CHANGE UP (ref 22–31)
CREAT SERPL-MCNC: 1.13 MG/DL — SIGNIFICANT CHANGE UP (ref 0.5–1.3)
CULTURE RESULTS: SIGNIFICANT CHANGE UP
CULTURE RESULTS: SIGNIFICANT CHANGE UP
D DIMER BLD IA.RAPID-MCNC: 584 NG/ML DDU — HIGH
EGFR: 77 ML/MIN/1.73M2 — SIGNIFICANT CHANGE UP
GLUCOSE BLDC GLUCOMTR-MCNC: 165 MG/DL — HIGH (ref 70–99)
GLUCOSE BLDC GLUCOMTR-MCNC: 169 MG/DL — HIGH (ref 70–99)
GLUCOSE BLDC GLUCOMTR-MCNC: 172 MG/DL — HIGH (ref 70–99)
GLUCOSE BLDC GLUCOMTR-MCNC: 253 MG/DL — HIGH (ref 70–99)
GLUCOSE BLDC GLUCOMTR-MCNC: 258 MG/DL — HIGH (ref 70–99)
GLUCOSE SERPL-MCNC: 173 MG/DL — HIGH (ref 70–99)
HCT VFR BLD CALC: 37 % — LOW (ref 39–50)
HGB BLD-MCNC: 11.8 G/DL — LOW (ref 13–17)
INR BLD: 1.21 RATIO — HIGH (ref 0.88–1.16)
MAGNESIUM SERPL-MCNC: 2.2 MG/DL — SIGNIFICANT CHANGE UP (ref 1.6–2.6)
MCHC RBC-ENTMCNC: 25.5 PG — LOW (ref 27–34)
MCHC RBC-ENTMCNC: 31.9 GM/DL — LOW (ref 32–36)
MCV RBC AUTO: 80.1 FL — SIGNIFICANT CHANGE UP (ref 80–100)
NRBC # BLD: 0 /100 WBCS — SIGNIFICANT CHANGE UP (ref 0–0)
PHOSPHATE SERPL-MCNC: 2.5 MG/DL — SIGNIFICANT CHANGE UP (ref 2.5–4.5)
PLATELET # BLD AUTO: 188 K/UL — SIGNIFICANT CHANGE UP (ref 150–400)
POTASSIUM SERPL-MCNC: 3.4 MMOL/L — LOW (ref 3.5–5.3)
POTASSIUM SERPL-SCNC: 3.4 MMOL/L — LOW (ref 3.5–5.3)
PROTHROM AB SERPL-ACNC: 13.9 SEC — HIGH (ref 10.5–13.4)
RBC # BLD: 4.62 M/UL — SIGNIFICANT CHANGE UP (ref 4.2–5.8)
RBC # FLD: 14.2 % — SIGNIFICANT CHANGE UP (ref 10.3–14.5)
RH IG SCN BLD-IMP: POSITIVE — SIGNIFICANT CHANGE UP
SODIUM SERPL-SCNC: 138 MMOL/L — SIGNIFICANT CHANGE UP (ref 135–145)
SPECIMEN SOURCE: SIGNIFICANT CHANGE UP
SPECIMEN SOURCE: SIGNIFICANT CHANGE UP
WBC # BLD: 7 K/UL — SIGNIFICANT CHANGE UP (ref 3.8–10.5)
WBC # FLD AUTO: 7 K/UL — SIGNIFICANT CHANGE UP (ref 3.8–10.5)

## 2022-12-27 PROCEDURE — 71260 CT THORAX DX C+: CPT | Mod: 26

## 2022-12-27 PROCEDURE — 99232 SBSQ HOSP IP/OBS MODERATE 35: CPT

## 2022-12-27 PROCEDURE — 76942 ECHO GUIDE FOR BIOPSY: CPT | Mod: 26

## 2022-12-27 PROCEDURE — 88305 TISSUE EXAM BY PATHOLOGIST: CPT | Mod: 26

## 2022-12-27 PROCEDURE — 60100 BIOPSY OF THYROID: CPT

## 2022-12-27 PROCEDURE — 74177 CT ABD & PELVIS W/CONTRAST: CPT | Mod: 26

## 2022-12-27 PROCEDURE — 99233 SBSQ HOSP IP/OBS HIGH 50: CPT

## 2022-12-27 PROCEDURE — 88173 CYTOPATH EVAL FNA REPORT: CPT | Mod: 26

## 2022-12-27 PROCEDURE — 93970 EXTREMITY STUDY: CPT | Mod: 26

## 2022-12-27 RX ORDER — ASPIRIN/CALCIUM CARB/MAGNESIUM 324 MG
1 TABLET ORAL
Qty: 0 | Refills: 0 | DISCHARGE

## 2022-12-27 RX ORDER — POTASSIUM CHLORIDE 20 MEQ
40 PACKET (EA) ORAL ONCE
Refills: 0 | Status: COMPLETED | OUTPATIENT
Start: 2022-12-27 | End: 2022-12-27

## 2022-12-27 RX ORDER — CLOPIDOGREL BISULFATE 75 MG/1
75 TABLET, FILM COATED ORAL DAILY
Refills: 0 | Status: DISCONTINUED | OUTPATIENT
Start: 2022-12-27 | End: 2022-12-28

## 2022-12-27 RX ADMIN — TAMSULOSIN HYDROCHLORIDE 0.8 MILLIGRAM(S): 0.4 CAPSULE ORAL at 21:45

## 2022-12-27 RX ADMIN — INSULIN GLARGINE 24 UNIT(S): 100 INJECTION, SOLUTION SUBCUTANEOUS at 21:46

## 2022-12-27 RX ADMIN — CEFEPIME 100 MILLIGRAM(S): 1 INJECTION, POWDER, FOR SOLUTION INTRAMUSCULAR; INTRAVENOUS at 21:45

## 2022-12-27 RX ADMIN — Medication 40 MILLIEQUIVALENT(S): at 15:54

## 2022-12-27 RX ADMIN — CHLORHEXIDINE GLUCONATE 1 APPLICATION(S): 213 SOLUTION TOPICAL at 05:23

## 2022-12-27 RX ADMIN — CEFEPIME 100 MILLIGRAM(S): 1 INJECTION, POWDER, FOR SOLUTION INTRAMUSCULAR; INTRAVENOUS at 15:49

## 2022-12-27 RX ADMIN — ATORVASTATIN CALCIUM 80 MILLIGRAM(S): 80 TABLET, FILM COATED ORAL at 21:45

## 2022-12-27 RX ADMIN — Medication 3: at 17:14

## 2022-12-27 RX ADMIN — Medication 650 MILLIGRAM(S): at 05:11

## 2022-12-27 RX ADMIN — CEFEPIME 100 MILLIGRAM(S): 1 INJECTION, POWDER, FOR SOLUTION INTRAMUSCULAR; INTRAVENOUS at 05:10

## 2022-12-27 RX ADMIN — Medication 650 MILLIGRAM(S): at 06:00

## 2022-12-27 RX ADMIN — AMLODIPINE BESYLATE 10 MILLIGRAM(S): 2.5 TABLET ORAL at 05:11

## 2022-12-27 RX ADMIN — FINASTERIDE 5 MILLIGRAM(S): 5 TABLET, FILM COATED ORAL at 15:49

## 2022-12-27 RX ADMIN — Medication 81 MILLIGRAM(S): at 15:49

## 2022-12-27 RX ADMIN — Medication 8 UNIT(S): at 17:15

## 2022-12-27 NOTE — PROGRESS NOTE ADULT - ATTENDING COMMENTS
57 y.o male h/of CAD, CVA (w/o residual weakness), DM, p/w bradycardia. EKG concerning for 3:1, Mobitz type II AV block. CT chest: Mediastinal and supraclavicular lymph nodes. ICD placed on 12/21. c/b fever: infectious work up underway .     Febrile this morning, TMax 102.3F. Patient asymptomatic, denies chest pain, fatigue, chills, cough. States L upper arm is painful     - F/u with repeat BClx today, prior BClx 12/22 NGTD    - D-dimer elevated, f/u with Doppler of upper and lower extremities to r/o DVT/PT as it can be a source of fever   - F/u with CT C/A/P   - C/w Cefepime. Monitor AICD site closely for infection, currently appears clean, does not appear to be the source of infection  - Preliminary pathology reports of L supraclavicular LN positive for follicular thyroid tissue  - per d/w pathologist and IR plan to repeat L supraclavicular LN biopsy today   - Warm compress or ice packs to upper arm   - Highly recommend outpatient PET/CT scan   - Close outpt f/u w/ cards and rheum  - ID & EP following  - Endo following, appreciate recs on thyroid nodule/LN biopsy results.

## 2022-12-27 NOTE — PROGRESS NOTE ADULT - SUBJECTIVE AND OBJECTIVE BOX
seen earlier today     Chief Complaint: Type 2 Diabetes Mellitus     INTERVAL HX: pt seen at bedside with wife pt going to repeat biopsy , was NPO p MN however lantus not adjusted by team with FBG at goal , reports he had food around 22:30 last night with 2am BG above goal . Febrile O/n .      Review of Systems:  General: As above  Cardiovascular: No chest pain  Respiratory: No SOB  GI: No nausea, vomiting  Endocrine: no  S&Sx of hypoglycemia    Allergies    No Known Allergies    Intolerances      MEDICATIONS  (STANDING):  amLODIPine   Tablet 10 milliGRAM(s) Oral daily  aspirin enteric coated 81 milliGRAM(s) Oral daily  atorvastatin 80 milliGRAM(s) Oral at bedtime  cefepime   IVPB      cefepime   IVPB 1000 milliGRAM(s) IV Intermittent every 8 hours  chlorhexidine 2% Cloths 1 Application(s) Topical <User Schedule>  dextrose 50% Injectable 25 Gram(s) IV Push once  dextrose 50% Injectable 12.5 Gram(s) IV Push once  dextrose 50% Injectable 25 Gram(s) IV Push once  dextrose Oral Gel 15 Gram(s) Oral once  finasteride 5 milliGRAM(s) Oral daily  glucagon  Injectable 1 milliGRAM(s) IntraMuscular once  influenza   Vaccine 0.5 milliLiter(s) IntraMuscular once  insulin glargine Injectable (LANTUS) 24 Unit(s) SubCutaneous at bedtime  insulin lispro (ADMELOG) corrective regimen sliding scale   SubCutaneous three times a day before meals  insulin lispro (ADMELOG) corrective regimen sliding scale   SubCutaneous at bedtime  insulin lispro Injectable (ADMELOG) 8 Unit(s) SubCutaneous three times a day before meals  potassium chloride    Tablet ER 40 milliEquivalent(s) Oral once  potassium chloride   Solution 40 milliEquivalent(s) Oral once  tamsulosin 0.8 milliGRAM(s) Oral at bedtime      atorvastatin   80 milliGRAM(s) Oral (12-26-22 @ 21:16)    insulin glargine Injectable (LANTUS)   24 Unit(s) SubCutaneous (12-26-22 @ 21:17)    insulin lispro (ADMELOG) corrective regimen sliding scale   2  SubCutaneous (12-26-22 @ 16:51)    insulin lispro Injectable (ADMELOG)   8 Unit(s) SubCutaneous (12-26-22 @ 16:51)        PHYSICAL EXAM:  VITALS: T(C): 37.4 (12-27-22 @ 13:00)  T(F): 99.3 (12-27-22 @ 13:00), Max: 102.3 (12-27-22 @ 04:28)  HR: 89 (12-27-22 @ 14:00) (85 - 100)  BP: 152/82 (12-27-22 @ 14:00) (142/77 - 157/80)  RR:  (17 - 20)  SpO2:  (95% - 100%)  Wt(kg): --  GENERAL: male laying in bed  in NAD  Respiratory: Respirations unlabored   Extremities: Warm, no edema  NEURO: Alert , appropriate     LABS:  POCT Blood Glucose.: 165 mg/dL (12-27-22 @ 11:18)  POCT Blood Glucose.: 169 mg/dL (12-27-22 @ 05:19)  POCT Blood Glucose.: 258 mg/dL (12-27-22 @ 00:28)  POCT Blood Glucose.: 157 mg/dL (12-26-22 @ 21:07)  POCT Blood Glucose.: 225 mg/dL (12-26-22 @ 16:25)  POCT Blood Glucose.: 146 mg/dL (12-26-22 @ 11:54)  POCT Blood Glucose.: 111 mg/dL (12-26-22 @ 07:47)  POCT Blood Glucose.: 130 mg/dL (12-25-22 @ 22:20)  POCT Blood Glucose.: 150 mg/dL (12-25-22 @ 16:31)  POCT Blood Glucose.: 132 mg/dL (12-25-22 @ 12:00)  POCT Blood Glucose.: 153 mg/dL (12-24-22 @ 21:51)  POCT Blood Glucose.: 144 mg/dL (12-24-22 @ 16:40)                          11.8   7.00  )-----------( 188      ( 27 Dec 2022 06:23 )             37.0     12-27    138  |  106  |  19  ----------------------------<  173<H>  3.4<L>   |  22  |  1.13    Ca    8.7      27 Dec 2022 06:25  Phos  2.5     12-27  Mg     2.2     12-27    TPro  6.2  /  Alb  3.4  /  TBili  0.8  /  DBili  x   /  AST  34  /  ALT  37  /  AlkPhos  76  12-26    eGFR: 77 mL/min/1.73m2 (27 Dec 2022 06:25)    12-20 Chol 190 Direct LDL -- LDL calculated 110<H> HDL 53 Trig 132  Thyroid Function Tests:  12-19 @ 23:47 TSH 0.60 FreeT4 -- T3 -- Anti TPO -- Anti Thyroglobulin Ab -- TSI --      A1C with Estimated Average Glucose Result: 6.3 % (12-20-22 @ 08:16)    Estimated Average Glucose: 134 mg/dL (12-20-22 @ 08:16)    Fructosamine, Serum: 255 umol/L (12-23-22 @ 06:41)      Diet, Regular:   Consistent Carbohydrate No Snacks (CSTCHO)  DASH/TLC Sodium & Cholesterol Restricted (DASH)  Halal (12-27-22 @ 14:29) [Active]

## 2022-12-27 NOTE — PROGRESS NOTE ADULT - ASSESSMENT
58yo male with PMH of prior CVA with mild left sided deficits, HTN, DM, vertigo and HLD presents with Mobitz II 3:1 AVB, symptomatic. TTE nml Secondary workup most remarkable for CT mediastinal and supraclavicular LN, raising concern for Sarcoidosis    1. Mobitz II AV Block  2. R/o Sarcoidosis    - s/p Medtronic dual chamber ICD implant (12/21/22)  - Febrile to 102.3 this AM, on Cefepime  - Post op fever noted unknown etiology, sepsis protocol initiated, BCx NGTD  - Monitor ICD wound site for bleeding, hematoma, redness, or drainage  - Plan for repeat biopsy today with IR  - Need outpatient PET Scan r/o Sarcoidosis (ordered in [employer])  - Follow up with [facility] on 1/6/22 @ 2:40pm  - Continue telemetry monitoring  - Keep K 4.0 and Mg 2.0, supplement as needed

## 2022-12-27 NOTE — PRE-ANESTHESIA EVALUATION ADULT - NSANTHOSAYNRD_GEN_A_CORE
No. KEEGAN screening performed.  STOP BANG Legend: 0-2 = LOW Risk; 3-4 = INTERMEDIATE Risk; 5-8 = HIGH Risk
No. KEEGAN screening performed.  STOP BANG Legend: 0-2 = LOW Risk; 3-4 = INTERMEDIATE Risk; 5-8 = HIGH Risk

## 2022-12-27 NOTE — PROGRESS NOTE ADULT - SUBJECTIVE AND OBJECTIVE BOX
24H hour events: Temp 102.3 this AM    MEDICATIONS:  amLODIPine   Tablet 10 milliGRAM(s) Oral daily  aspirin enteric coated 81 milliGRAM(s) Oral daily  cefepime   IVPB      cefepime   IVPB 1000 milliGRAM(s) IV Intermittent every 8 hours  acetaminophen     Tablet .. 650 milliGRAM(s) Oral every 6 hours PRN  melatonin 3 milliGRAM(s) Oral at bedtime PRN  ondansetron Injectable 4 milliGRAM(s) IV Push every 8 hours PRN  aluminum hydroxide/magnesium hydroxide/simethicone Suspension 30 milliLiter(s) Oral every 4 hours PRN  atropine Injectable 1 milliGRAM(s) IV Push once PRN  atorvastatin 80 milliGRAM(s) Oral at bedtime  dextrose 50% Injectable 25 Gram(s) IV Push once  dextrose 50% Injectable 12.5 Gram(s) IV Push once  dextrose 50% Injectable 25 Gram(s) IV Push once  dextrose Oral Gel 15 Gram(s) Oral once  finasteride 5 milliGRAM(s) Oral daily  glucagon  Injectable 1 milliGRAM(s) IntraMuscular once  insulin glargine Injectable (LANTUS) 24 Unit(s) SubCutaneous at bedtime  insulin lispro (ADMELOG) corrective regimen sliding scale   SubCutaneous three times a day before meals  insulin lispro (ADMELOG) corrective regimen sliding scale   SubCutaneous at bedtime  insulin lispro Injectable (ADMELOG) 8 Unit(s) SubCutaneous three times a day before meals  chlorhexidine 2% Cloths 1 Application(s) Topical <User Schedule>  influenza   Vaccine 0.5 milliLiter(s) IntraMuscular once  lactated ringers 1000 milliLiter(s) IV Continuous <Continuous>  potassium chloride   Solution 40 milliEquivalent(s) Oral once  tamsulosin 0.8 milliGRAM(s) Oral at bedtime      REVIEW OF SYSTEMS:  Complete 12 point ROS negative.    PHYSICAL EXAM:  T(C): 36.9 (12-27-22 @ 06:00), Max: 39.1 (12-27-22 @ 04:28)  HR: 100 (12-27-22 @ 04:28) (90 - 100)  BP: 142/77 (12-27-22 @ 04:28) (142/77 - 155/79)  RR: 17 (12-27-22 @ 04:28) (17 - 18)  SpO2: 96% (12-27-22 @ 04:28) (96% - 98%)  Wt(kg): --  I&O's Summary    26 Dec 2022 07:01  -  27 Dec 2022 07:00  --------------------------------------------------------  IN: 1080 mL / OUT: 0 mL / NET: 1080 mL    27 Dec 2022 07:01  -  27 Dec 2022 09:14  --------------------------------------------------------  IN: 0 mL / OUT: 0 mL / NET: 0 mL        LABS:	 	    CBC Full  -  ( 27 Dec 2022 06:23 )  WBC Count : 7.00 K/uL  Hemoglobin : 11.8 g/dL  Hematocrit : 37.0 %  Platelet Count - Automated : 188 K/uL  Mean Cell Volume : 80.1 fl  Mean Cell Hemoglobin : 25.5 pg  Mean Cell Hemoglobin Concentration : 31.9 gm/dL  Auto Neutrophil # : x  Auto Lymphocyte # : x  Auto Monocyte # : x  Auto Eosinophil # : x  Auto Basophil # : x  Auto Neutrophil % : x  Auto Lymphocyte % : x  Auto Monocyte % : x  Auto Eosinophil % : x  Auto Basophil % : x    12-27    138  |  106  |  19  ----------------------------<  173<H>  3.4<L>   |  22  |  1.13  12-26    141  |  107  |  18  ----------------------------<  104<H>  3.5   |  21<L>  |  1.11    Ca    8.7      27 Dec 2022 06:25  Ca    8.6      26 Dec 2022 05:49  Phos  2.5     12-27  Phos  2.5     12-26  Mg     2.2     12-27  Mg     2.1     12-26    TPro  6.2  /  Alb  3.4  /  TBili  0.8  /  DBili  x   /  AST  34  /  ALT  37  /  AlkPhos  76  12-26      TELEMETRY: SR 80-100s/ventricular pacing	      < from: Transthoracic Echocardiogram (12.20.22 @ 12:56) >  PROCEDURE: Transthoracic echocardiogram with 2-D, M-Mode  and complete spectral and color flow Doppler.  INDICATION: Abnormal electrocardiogram  (R94.31 )  ------------------------------------------------------------------------  Dimensions:    Normal Values:  LA:     3.5    2.0 - 4.0 cm  Ao:     3.2    2.0 - 3.8 cm  SEPTUM: 1.0    0.6 - 1.2 cm  PWT:    1.0    0.6 - 1.1 cm  LVIDd:  4.5    3.0 - 5.6 cm  LVIDs:  2.9    1.8 - 4.0 cm  Derived variables:  LVMI: 85 g/m2  RWT: 0.48  Fractional short: 36 %  EF (Modified Mayers Rule): 57 %Doppler Peak Velocity  (m/sec): AoV=2.2  ------------------------------------------------------------------------  Observations:  Mitral Valve: Mitral annular calcification and calcified  mitral leaflets with normal diastolic opening. Mild mitral  regurgitation.  Aortic Valve/Aorta: Calcified trileaflet aortic valve with  normal opening. Peak transaortic valve gradient equals 20  mm Hg, mean transaortic valve gradient equals 9 mm Hg,  aortic valve velocity time integral equals 47 cm, estimated  aortic valve area equals 2.1 sqcm. No aortic valve  regurgitation seen. Peak left ventricular outflow tract  gradient equals 8 mm Hg, mean gradient is equal to 5 mm Hg,  LVOT velocity time integral equals 37 cm.  Aortic Root: 3.2 cm.  LVOT diameter: 1.7 cm.  Left Atrium: Normal left atrium.  LA volume index = 27  cc/m2.  Left Ventricle: Normal left ventricular systolic function.  No segmental wall motion abnormalities. Increased relative  wall thickness with normal left ventricular mass index,  consistent with concentric left ventricular remodeling.  Normal diastolic function.  Right Heart: Normal right atrium. Normal right ventricular  size and function. Normal tricuspid valve. Mild tricuspid  regurgitation. Normal pulmonic valve. No pulmonic  regurgitation.  Pericardium/Pleura: Normal pericardium with no pericardial  effusion.  Hemodynamic: Estimated right atrial pressure is 8 mm Hg.  Estimated right ventricular systolic pressure equals 35 mm  Hg, assuming right atrial pressure equals 8 mm Hg,  consistent with borderline pulmonary hypertension.  ------------------------------------------------------------------------  Conclusions:  1. Mitral annular calcification and calcified mitral  leaflets with normal diastolic opening. Mild mitral  regurgitation.  2. Calcified trileaflet aortic valve with normal opening.  No aortic valve regurgitation seen.  3. Normal left ventricular systolic function. No segmental  wall motion abnormalities.  4. Normal diastolic function.  5. Normal right ventricular size and function.  *** No previous Echo exam.    < end of copied text >

## 2022-12-27 NOTE — PROGRESS NOTE ADULT - ASSESSMENT
Patient is a 57 y M with a PMHx of CAD, CVA (2012 with no significant residual weakness), DM, p/w bradycardia    #Uncontrolled T2DM, c/b CVA  A1c 6.3% Goal < 7%  Home regimen:  Metformin 1g BID, Steglatro 15 mg daily, Glipizide 5 mg daily    Inpatient Recs:   - BG Goal 100-180mg/dl   - FS TID AC qHS or q6h if NPO   -c/w Lantus 24 units qHS  -c/w Admelog 8 qAC. HOLD if NPO no pattern available for adjustment   -low dose correctional scale before each meal and low dose correctional scale at bedtime  -consistent carb diet  -FS qAC and qHS    For d/c:   -Metformin 1g BID + Steglatro 15 mg daily  -Stop Glipizide 5 mg daily  -Can fu with Dr. Medina    #HTN  BP Goal < 130/80  Continue management per primary team    #HLD  LDL goal < 70  Statin if no contraindications      Thyroid Nodule/ Lymph node biopsy with ?thyroid tissue   Thyroid US from 12/23 :   Findings : Heterogeneous mass along the left lower pole, measuring 3.4 x 2.6 x 3.4   cm, possibly exophytic thyroid nodule. Additional right TI-RAD nodules, measuring up to 2.4 cm (TI-RAD 3).  TI-RAD 3: Mildly suspicious (FNA if > 2.5 cm, Follow if > 1.5 cm)    Lymph Node Biopsy from 12/21/22:  Final Diagnosis  "LYMPH NODE", SUPRACLAVICULAR, LEFT, US GUIDED CORE BIOPSY  Thyroid tissue with follicular architecture, no lymph node elements seen    Touch prep slides and core biopsy specimen are composed of thyroid  follicles of varying sizes and colloid. No lymph node elements are  seen. This is favored to represent   ectopic thyroid tissue or the thyroid  gland itself with nodular hyperplasia.   If this is truly a lymph node  the possibilities would include a completely replaced lymph node, as  no lymph node tissue is seen, with a follicular thyroid neoplasm, this  is not favored based on the morphology however a remote possibility may  exist but is highly unlikely. Clinical, radiological, and pathologic  correlation is essential.    case discussed with Dr Grande endocrine attg, Endocrine attg to provide further recommendations regarding  LN biopsy/thyroid nodule     discussed with patient and team Medicine Attg  Dr Warren   Contact via [employer] Teams during business hours  On evenings and weekends (or if no response on [employer] Teams) please call [phone] or page endocrine fellow on call.   For nonurgent matters please email [email or web address]    Please note that this patient may be followed by different provider tomorrow.  Notify endocrine 24 hours prior to discharge for final recommendations    greater than 50% of the encounter was spent counseling and/or coordination of care.  29 minutes spent on total encounter; The necessity of the time spent during the encounter on this date of service was due to development of plan of care/coordination of care/glycemic control through review of labs, blood glucose values and vital signs.

## 2022-12-27 NOTE — PROVIDER CONTACT NOTE (OTHER) - ASSESSMENT
Patient is alert and oriented X4. denies chest pain and shortness of breath. Denies chills and weakness. No burning on urination. Temp 102.3F   pulse 100, Bp 142/77, Resp 17, 96% on room air.

## 2022-12-27 NOTE — PROGRESS NOTE ADULT - SUBJECTIVE AND OBJECTIVE BOX
Dot Muse, PGY1    INCOMPLETE NOTE - IN PROGRESS    Patient is a 57y old  Male who presents with a chief complaint of bradycardia (27 Dec 2022 09:52)      SUBJECTIVE/INTERVAL EVENTS: Patient seen and examined at bedside.    MEDICATIONS  (STANDING):  amLODIPine   Tablet 10 milliGRAM(s) Oral daily  aspirin enteric coated 81 milliGRAM(s) Oral daily  atorvastatin 80 milliGRAM(s) Oral at bedtime  cefepime   IVPB      cefepime   IVPB 1000 milliGRAM(s) IV Intermittent every 8 hours  chlorhexidine 2% Cloths 1 Application(s) Topical <User Schedule>  dextrose 50% Injectable 25 Gram(s) IV Push once  dextrose 50% Injectable 12.5 Gram(s) IV Push once  dextrose 50% Injectable 25 Gram(s) IV Push once  dextrose Oral Gel 15 Gram(s) Oral once  finasteride 5 milliGRAM(s) Oral daily  glucagon  Injectable 1 milliGRAM(s) IntraMuscular once  influenza   Vaccine 0.5 milliLiter(s) IntraMuscular once  insulin glargine Injectable (LANTUS) 24 Unit(s) SubCutaneous at bedtime  insulin lispro (ADMELOG) corrective regimen sliding scale   SubCutaneous three times a day before meals  insulin lispro (ADMELOG) corrective regimen sliding scale   SubCutaneous at bedtime  insulin lispro Injectable (ADMELOG) 8 Unit(s) SubCutaneous three times a day before meals  lactated ringers 1000 milliLiter(s) (250 mL/Hr) IV Continuous <Continuous>  potassium chloride    Tablet ER 40 milliEquivalent(s) Oral once  potassium chloride   Solution 40 milliEquivalent(s) Oral once  tamsulosin 0.8 milliGRAM(s) Oral at bedtime    MEDICATIONS  (PRN):  acetaminophen     Tablet .. 650 milliGRAM(s) Oral every 6 hours PRN Temp greater or equal to 38C (100.4F), Mild Pain (1 - 3)  aluminum hydroxide/magnesium hydroxide/simethicone Suspension 30 milliLiter(s) Oral every 4 hours PRN Dyspepsia  atropine Injectable 1 milliGRAM(s) IV Push once PRN symptomatic bradycardia  melatonin 3 milliGRAM(s) Oral at bedtime PRN Insomnia  ondansetron Injectable 4 milliGRAM(s) IV Push every 8 hours PRN Nausea and/or Vomiting      VITAL SIGNS:  T(F): 98.4 (12-27-22 @ 11:21), Max: 102.3 (12-27-22 @ 04:28)  HR: 86 (12-27-22 @ 11:58) (86 - 100)  BP: 146/74 (12-27-22 @ 11:58) (142/77 - 155/79)  RR: 18 (12-27-22 @ 11:58) (17 - 18)  SpO2: 96% (12-27-22 @ 11:58) (96% - 98%)    I&O's Summary    26 Dec 2022 07:01  -  27 Dec 2022 07:00  --------------------------------------------------------  IN: 1080 mL / OUT: 0 mL / NET: 1080 mL    27 Dec 2022 07:01  -  27 Dec 2022 12:20  --------------------------------------------------------  IN: 0 mL / OUT: 0 mL / NET: 0 mL      Daily Height in cm: 185.42 (27 Dec 2022 11:58)    Daily     PHYSICAL EXAM:  Gen: Alert, NAD  HEENT: NCAT, conjunctiva clear, sclera anicteric, no erythema or exudates in the oropharynx, mmm  Neck: Supple, no JVD  CV: RRR, S1S2, no m/r/g  Resp: CTAB, normal respiratory effort  Abd: Soft, nontender, nondistended, normal bowel sounds  Ext: no edema, no clubbing or cyanosis  Neuro: AOx3, CN2-12 grossly intact, GALLEGOS  SKIN: warm, perfused    LABS:                        11.8   7.00  )-----------( 188      ( 27 Dec 2022 06:23 )             37.0     Hgb Trend: 11.8<--, 12.1<--, 12.3<--, 12.8<--, 12.2<--  12-27    138  |  106  |  19  ----------------------------<  173<H>  3.4<L>   |  22  |  1.13    Ca    8.7      27 Dec 2022 06:25  Phos  2.5     12-27  Mg     2.2     12-27    TPro  6.2  /  Alb  3.4  /  TBili  0.8  /  DBili  x   /  AST  34  /  ALT  37  /  AlkPhos  76  12-26    Creatinine Trend: 1.13<--, 1.11<--, 1.13<--, 1.10<--, 1.24<--, 1.39<--  LIVER FUNCTIONS - ( 26 Dec 2022 05:49 )  Alb: 3.4 g/dL / Pro: 6.2 g/dL / ALK PHOS: 76 U/L / ALT: 37 U/L / AST: 34 U/L / GGT: x           PT/INR - ( 27 Dec 2022 06:24 )   PT: 13.9 sec;   INR: 1.21 ratio         PTT - ( 27 Dec 2022 06:24 )  PTT:28.6 sec          CAPILLARY BLOOD GLUCOSE      POCT Blood Glucose.: 165 mg/dL (27 Dec 2022 11:18)  POCT Blood Glucose.: 169 mg/dL (27 Dec 2022 05:19)  POCT Blood Glucose.: 258 mg/dL (27 Dec 2022 00:28)  POCT Blood Glucose.: 157 mg/dL (26 Dec 2022 21:07)  POCT Blood Glucose.: 225 mg/dL (26 Dec 2022 16:25)      RADIOLOGY & ADDITIONAL TESTS: Reviewed    Imaging Personally Reviewed:    Consultant(s) Notes Reviewed:      Care Discussed with Consultants/Other Providers:   Dot Muse, PGY1    Patient is a 57y old  Male who presents with a chief complaint of bradycardia (27 Dec 2022 09:52)    SUBJECTIVE/INTERVAL EVENTS: Patient seen and examined at bedside. Had fever of 102.3 overnight. Denies any fever, chills at bedside while I evaluated him this AM. Went down for IR for biopsy today. Tolerated well. Some noticeable damp sheets this AM.     MEDICATIONS  (STANDING):  amLODIPine   Tablet 10 milliGRAM(s) Oral daily  aspirin enteric coated 81 milliGRAM(s) Oral daily  atorvastatin 80 milliGRAM(s) Oral at bedtime  cefepime   IVPB      cefepime   IVPB 1000 milliGRAM(s) IV Intermittent every 8 hours  chlorhexidine 2% Cloths 1 Application(s) Topical <User Schedule>  dextrose 50% Injectable 25 Gram(s) IV Push once  dextrose 50% Injectable 12.5 Gram(s) IV Push once  dextrose 50% Injectable 25 Gram(s) IV Push once  dextrose Oral Gel 15 Gram(s) Oral once  finasteride 5 milliGRAM(s) Oral daily  glucagon  Injectable 1 milliGRAM(s) IntraMuscular once  influenza   Vaccine 0.5 milliLiter(s) IntraMuscular once  insulin glargine Injectable (LANTUS) 24 Unit(s) SubCutaneous at bedtime  insulin lispro (ADMELOG) corrective regimen sliding scale   SubCutaneous three times a day before meals  insulin lispro (ADMELOG) corrective regimen sliding scale   SubCutaneous at bedtime  insulin lispro Injectable (ADMELOG) 8 Unit(s) SubCutaneous three times a day before meals  lactated ringers 1000 milliLiter(s) (250 mL/Hr) IV Continuous <Continuous>  potassium chloride    Tablet ER 40 milliEquivalent(s) Oral once  potassium chloride   Solution 40 milliEquivalent(s) Oral once  tamsulosin 0.8 milliGRAM(s) Oral at bedtime    MEDICATIONS  (PRN):  acetaminophen     Tablet .. 650 milliGRAM(s) Oral every 6 hours PRN Temp greater or equal to 38C (100.4F), Mild Pain (1 - 3)  aluminum hydroxide/magnesium hydroxide/simethicone Suspension 30 milliLiter(s) Oral every 4 hours PRN Dyspepsia  atropine Injectable 1 milliGRAM(s) IV Push once PRN symptomatic bradycardia  melatonin 3 milliGRAM(s) Oral at bedtime PRN Insomnia  ondansetron Injectable 4 milliGRAM(s) IV Push every 8 hours PRN Nausea and/or Vomiting      VITAL SIGNS:  T(F): 98.4 (12-27-22 @ 11:21), Max: 102.3 (12-27-22 @ 04:28)  HR: 86 (12-27-22 @ 11:58) (86 - 100)  BP: 146/74 (12-27-22 @ 11:58) (142/77 - 155/79)  RR: 18 (12-27-22 @ 11:58) (17 - 18)  SpO2: 96% (12-27-22 @ 11:58) (96% - 98%)    I&O's Summary    26 Dec 2022 07:01  -  27 Dec 2022 07:00  --------------------------------------------------------  IN: 1080 mL / OUT: 0 mL / NET: 1080 mL    27 Dec 2022 07:01  -  27 Dec 2022 12:20  --------------------------------------------------------  IN: 0 mL / OUT: 0 mL / NET: 0 mL      Daily Height in cm: 185.42 (27 Dec 2022 11:58)    Daily     PHYSICAL EXAM:  Gen: Alert, NAD  HEENT: NCAT, conjunctiva clear, sclera anicteric, no erythema or exudates in the oropharynx, mmm  Neck: Supple, no JVD  CV: RRR, S1S2, no m/r/g  Resp: CTAB, normal respiratory effort  Abd: Soft, nontender, nondistended, normal bowel sounds  Ext: no edema, no clubbing or cyanosis  Neuro: AOx3, CN2-12 grossly intact, GALLEGOS  SKIN: warm, perfused    LABS:                        11.8   7.00  )-----------( 188      ( 27 Dec 2022 06:23 )             37.0     Hgb Trend: 11.8<--, 12.1<--, 12.3<--, 12.8<--, 12.2<--  12-27    138  |  106  |  19  ----------------------------<  173<H>  3.4<L>   |  22  |  1.13    Ca    8.7      27 Dec 2022 06:25  Phos  2.5     12-27  Mg     2.2     12-27    TPro  6.2  /  Alb  3.4  /  TBili  0.8  /  DBili  x   /  AST  34  /  ALT  37  /  AlkPhos  76  12-26    Creatinine Trend: 1.13<--, 1.11<--, 1.13<--, 1.10<--, 1.24<--, 1.39<--  LIVER FUNCTIONS - ( 26 Dec 2022 05:49 )  Alb: 3.4 g/dL / Pro: 6.2 g/dL / ALK PHOS: 76 U/L / ALT: 37 U/L / AST: 34 U/L / GGT: x           PT/INR - ( 27 Dec 2022 06:24 )   PT: 13.9 sec;   INR: 1.21 ratio         PTT - ( 27 Dec 2022 06:24 )  PTT:28.6 sec          CAPILLARY BLOOD GLUCOSE      POCT Blood Glucose.: 165 mg/dL (27 Dec 2022 11:18)  POCT Blood Glucose.: 169 mg/dL (27 Dec 2022 05:19)  POCT Blood Glucose.: 258 mg/dL (27 Dec 2022 00:28)  POCT Blood Glucose.: 157 mg/dL (26 Dec 2022 21:07)  POCT Blood Glucose.: 225 mg/dL (26 Dec 2022 16:25)      RADIOLOGY & ADDITIONAL TESTS: Reviewed    Imaging Personally Reviewed:    Consultant(s) Notes Reviewed:      Care Discussed with Consultants/Other Providers:

## 2022-12-27 NOTE — PROGRESS NOTE ADULT - PROBLEM SELECTOR PLAN 3
-LAD seen on CT noncontrast   -pulm c/s, possible biopsy through ebus?  -rheum c/s: outpt f/u w/ biopsy result and lab work  -outpatient PETScan r/o Sarcoidosis (ordered in [employer]), will schedule as outpatient  -follicular thyroid tissue seen in biopsy sample  -will require repeat biopsy, scheduled for 12/27  -NPO at midnight 12/27 and hold AC morning of  -if pt leaves before then, will require pcp to write a script for IR biopsy, insurance authorization, full set of labs before scheduling procedure (call [phone] to make appt)  -U/s of thyroid: Heterogeneous mass along the left lower pole, measuring 3.4 x 2.6 x 3.4 cm, possibly exophytic thyroid nodule.  - 12/27: Mediastinal Lymph Node Core biopsy today, will f/u results -LAD seen on CT noncontrast   -pulm c/s, possible biopsy through ebus?  -rheum c/s: outpt f/u w/ biopsy result and lab work  -outpatient PETScan r/o Sarcoidosis (ordered in [employer]), will schedule as outpatient  -follicular thyroid tissue seen in biopsy sample  -will require repeat biopsy, scheduled for 12/27  -NPO at midnight 12/27 and hold AC morning of  -if pt leaves before then, will require pcp to write a script for IR biopsy, insurance authorization, full set of labs before scheduling procedure (call [phone] to make appt)  -U/s of thyroid: Heterogeneous mass along the left lower pole, measuring 3.4 x 2.6 x 3.4 cm, possibly exophytic thyroid nodule. Will f/u Endocrine recommendations 12/28  - 12/27: Mediastinal Lymph Node Core biopsy today, will f/u results

## 2022-12-27 NOTE — CHART NOTE - NSCHARTNOTEFT_GEN_A_CORE
57 y.o male h/of CAD, CVA (w/o residual weakness), DM, p/w bradycardia. EKG concerning for 3:1, Mobitz type II AV block. CT chest: Mediastinal and supraclavicular lymph nodes. ICD placed on 12/21. c/b fever: infectious work up underway .     Febrile this morning, TMax 102.3F. Patient asymptomatic, denies chest pain, fatigue, chills, cough. States L upper arm is painful     - F/u with repeat BClx today, prior BClx 12/22 NGTD    - D-dimer elevated, f/u with Doppler of upper and lower extremities to r/o DVT/PT as it can be a source of fever   - F/u with CT C/A/P   - C/w Cefepime monitor AICD site closely for infection, currently appears clean, does not appear to be the source of infection  - Preliminary pathology reports of L supraclavicular LN positive for follicular thyroid tissue  - per d/w pathologist and IR plan to repeat L supraclavicular LN biopsy today   - Highly recommend outpatient PET/CT scan   - Close outpt f/u w/ cards and rheum  - ID & EP following  - Endo following, appreciate recs on thyroid nodule/LN biopsy results

## 2022-12-27 NOTE — PROCEDURE NOTE - PROCEDURE FINDINGS AND DETAILS
- Left supraclavicular Lymph Node Biopsy performed  - 4 18G core needle biopsy performed  - 4 Cores given to Cytopathologists and deemed adequate for study  - Pressure held to puncture site for 10 minutes  - Dry sterile dressing placed, no bleeding noted
Successful left supraclavicular lymph node/thyroid nodule biopsy. Cores and FNAs obtained.

## 2022-12-27 NOTE — PROGRESS NOTE ADULT - ASSESSMENT
Patient is a 57 y M with a PMHx of CAD, CVA (2012 with no significant residual weakness), DM, p/w bradycardia. The day prior to admission, pt reports feeling dizzy, off-balance, unable to walk. He also experienced nausea and had some episodes of vomiting. He went to [facility] where he was noted to be bradycardic, was transferred here. Upon presentation, HR in the 30s. EKG concerning for 3:1, Mobitz type II AV block. Pt was given Atropine x3, with HRs remaining in the 40s. Pt denies chest pain, dyspnea, abdominal pain. Pt does not regularly follow up with a cardiologist but had seen one (for the first time), 2 months after being discharged from the ED of an OSH for abdominal pain. Per patient report, stress test was normal.      Pt denies sick contacts and recent travel. (20 Dec 2022 09:50)  Hospital course: Found to be bradycardic to the 30s-40s with EKG showing 3:1 Mobitz type II AV block. CT chest showing incidental mediastinal and supraclavicular lymph nodes (upper tracheal lymph node with calcification that measures 3.1  x 2.9 cm), left supraclavicular lymph node (4.1 x 2.9 cm), subcarinal lymph node (2.3 x 1.9 cm).    12/21 - AICD placed. Pt also with lymph node biopsy    Pt spiked fevers overnight on 12/22 and was cultured and started on vanco and zosyn    ID is asked to evaluate      A/P  #FEVER  Pt continued fevers   pt denies Gi , or URI sxs  IV site clean   pt  had  mild erythema over the AICD site, no purulence, tenderness or swelling - this erythema has fully resolved and cultures negative  Biopsy site in same vicinity but no definite swelling or discharge over that specifically    Exploration of non-infectious causes for fever per primary team  nasal MRSA swab negative-  discontinued vancomycin if blood cultures remain negative  would repeat RVP/SARS CoV2 swab  ON abs since 12/22- day # 6/7, complete tomorrow   all cultures negative      #INGRID  pt s/p biopsy  preliminary biopsy noted  s/p  excisional biopsy  check LDH  QuantiFeron indeterminate    #multinodular thyroid  await biopsy results    #Bladder dome findings  A 9 mm soft tissue nodule of the bladder dome in the region of the   urachal remnant. Cannot exclude small urachal neoplasm.   Radiology suggests MRI with contrast for further evaluation.      Marcela Ramos M.D. ,   please reach via teams   If no answer, or after 5PM/ weekends,  then please call  [phone]      Assessment and plan discussed with the primary team .    I will be away as of tomorrow. My associates will be covering. Please call [phone] with acute issues, questions.                   Patient is a 57 y M with a PMHx of CAD, CVA (2012 with no significant residual weakness), DM, p/w bradycardia. The day prior to admission, pt reports feeling dizzy, off-balance, unable to walk. He also experienced nausea and had some episodes of vomiting. He went to [facility] where he was noted to be bradycardic, was transferred here. Upon presentation, HR in the 30s. EKG concerning for 3:1, Mobitz type II AV block. Pt was given Atropine x3, with HRs remaining in the 40s. Pt denies chest pain, dyspnea, abdominal pain. Pt does not regularly follow up with a cardiologist but had seen one (for the first time), 2 months after being discharged from the ED of an OSH for abdominal pain. Per patient report, stress test was normal.      Pt denies sick contacts and recent travel. (20 Dec 2022 09:50)  Hospital course: Found to be bradycardic to the 30s-40s with EKG showing 3:1 Mobitz type II AV block. CT chest showing incidental mediastinal and supraclavicular lymph nodes (upper tracheal lymph node with calcification that measures 3.1  x 2.9 cm), left supraclavicular lymph node (4.1 x 2.9 cm), subcarinal lymph node (2.3 x 1.9 cm).    12/21 - AICD placed. Pt also with lymph node biopsy    Pt spiked fevers overnight on 12/22 and was cultured and started on vanco and zosyn    ID is asked to evaluate      A/P  #FEVER  Pt continued fevers   pt denies Gi , or URI sxs  IV site clean   pt  had  mild erythema over the AICD site, no purulence, tenderness or swelling - this erythema has fully resolved and cultures negative  Biopsy site in same vicinity but no definite swelling or discharge over that specifically    Exploration of non-infectious causes for fever per primary team  nasal MRSA swab negative-  discontinued vancomycin if blood cultures remain negative  would repeat RVP/SARS CoV2 swab  ON abs since 12/22- day # 6/7, complete tomorrow   all cultures negative- repeat blood cultures sent      #INGRID  pt s/p biopsy  preliminary biopsy noted  s/p  excisional biopsy  check LDH  QuantiFeron indeterminate    #multinodular thyroid  await biopsy results    #Bladder dome findings  A 9 mm soft tissue nodule of the bladder dome in the region of the   urachal remnant. Cannot exclude small urachal neoplasm.   Radiology suggests MRI with contrast for further evaluation.      Marcela Ramos M.D. ,   please reach via teams   If no answer, or after 5PM/ weekends,  then please call  [phone]      Assessment and plan discussed with the primary team .    I will be away as of tomorrow. My associates will be covering. Please call [phone] with acute issues, questions.

## 2022-12-27 NOTE — PROGRESS NOTE ADULT - SUBJECTIVE AND OBJECTIVE BOX
Patient is a 57y old  Male who presents with a chief complaint of bradycardia (27 Dec 2022 09:14)    Being followed by ID for        Interval history:  No other acute events      ROS:  No cough,SOB,CP  No N/V/D  No abd pain  No urinary complaints  No HA  No joint or limb pain  No other complaints    PAST MEDICAL & SURGICAL HISTORY:  CVA (cerebrovascular accident)      HTN (hypertension)      DM (diabetes mellitus)      HTN (hypertension)        Allergies    No Known Allergies    Intolerances      Antimicrobials:    cefepime   IVPB      cefepime   IVPB 1000 milliGRAM(s) IV Intermittent every 8 hours    MEDICATIONS  (STANDING):  amLODIPine   Tablet 10 milliGRAM(s) Oral daily  aspirin enteric coated 81 milliGRAM(s) Oral daily  atorvastatin 80 milliGRAM(s) Oral at bedtime  cefepime   IVPB      cefepime   IVPB 1000 milliGRAM(s) IV Intermittent every 8 hours  chlorhexidine 2% Cloths 1 Application(s) Topical <User Schedule>  dextrose 50% Injectable 25 Gram(s) IV Push once  dextrose 50% Injectable 12.5 Gram(s) IV Push once  dextrose 50% Injectable 25 Gram(s) IV Push once  dextrose Oral Gel 15 Gram(s) Oral once  finasteride 5 milliGRAM(s) Oral daily  glucagon  Injectable 1 milliGRAM(s) IntraMuscular once  influenza   Vaccine 0.5 milliLiter(s) IntraMuscular once  insulin glargine Injectable (LANTUS) 24 Unit(s) SubCutaneous at bedtime  insulin lispro (ADMELOG) corrective regimen sliding scale   SubCutaneous three times a day before meals  insulin lispro (ADMELOG) corrective regimen sliding scale   SubCutaneous at bedtime  insulin lispro Injectable (ADMELOG) 8 Unit(s) SubCutaneous three times a day before meals  lactated ringers 1000 milliLiter(s) (250 mL/Hr) IV Continuous <Continuous>  potassium chloride    Tablet ER 40 milliEquivalent(s) Oral once  potassium chloride   Solution 40 milliEquivalent(s) Oral once  tamsulosin 0.8 milliGRAM(s) Oral at bedtime      Vital Signs Last 24 Hrs  T(C): 36.9 (12-27-22 @ 06:00), Max: 39.1 (12-27-22 @ 04:28)  T(F): 98.5 (12-27-22 @ 06:00), Max: 102.3 (12-27-22 @ 04:28)  HR: 100 (12-27-22 @ 04:28) (90 - 100)  BP: 142/77 (12-27-22 @ 04:28) (142/77 - 155/79)  BP(mean): --  RR: 17 (12-27-22 @ 04:28) (17 - 18)  SpO2: 96% (12-27-22 @ 04:28) (96% - 98%)    Physical Exam:    Constitutional well preserved,comfortable,pleasant    HEENT PERRLA EOMI,No pallor or icterus    No oral exudate or erythema    Neck supple no JVD or LN    Chest Good AE,CTA    CVS RRR S1 S2 WNl No murmur or rub or gallop    Abd soft BS normal No tenderness no masses    Ext No cyanosis clubbing or edema    IV site no erythema tenderness or discharge    Joints no swelling or LOM    CNS AAO X 3 no focal    Lab Data:                          11.8   7.00  )-----------( 188      ( 27 Dec 2022 06:23 )             37.0       12-27    138  |  106  |  19  ----------------------------<  173<H>  3.4<L>   |  22  |  1.13    Ca    8.7      27 Dec 2022 06:25  Phos  2.5     12-27  Mg     2.2     12-27    TPro  6.2  /  Alb  3.4  /  TBili  0.8  /  DBili  x   /  AST  34  /  ALT  37  /  AlkPhos  76  12-26          Clean Catch Clean Catch (Midstream)  12-22-22   <10,000 CFU/mL Normal Urogenital Tamela  --  --      .Blood Blood-Peripheral  12-22-22   No growth to date.  --  --        Respiratory Viral Panel with COVID-19 by FELIPA (12.22.22 @ 14:02)    Rapid RVP Result: [facility]    SARS-CoV-2: [facility]: This Respiratory Panel uses polymerase chain reaction (PCR) to detect for  adenovirus; coronavirus (HKU1, NL63, 229E, OC43); human metapneumovirus  (hMPV); human enterovirus/rhinovirus (Entero/RV); influenza A; influenza  A/H1; influenza A/H3; influenza A/H1-2009; influenza B; parainfluenza  viruses 1, 2, 3, 4; respiratory syncytial virus; Mycoplasma pneumoniae;  Chlamydophila pneumoniae; and SARS-CoV-2.                WBC Count: 7.00 (12-27-22 @ 06:23)  WBC Count: 7.21 (12-26-22 @ 05:49)  WBC Count: 8.40 (12-25-22 @ 06:34)  WBC Count: 10.14 (12-24-22 @ 08:45)  WBC Count: 9.87 (12-23-22 @ 06:41)  WBC Count: 11.11 (12-22-22 @ 13:33)  WBC Count: 9.63 (12-22-22 @ 06:18)  WBC Count: 7.59 (12-21-22 @ 06:58)             Patient is a 57y old  Male who presents with a chief complaint of bradycardia (27 Dec 2022 09:14)    Being followed by ID for fevers        Interval history:  when initially went to see patient , wife requested that patient not be woken as NPO for pprocedure which wasn't until late afternoon. When went back mid day pt was out of room at procedure. When returned later, pt was in bed , resting quietly , denies sxs despite ongoing fevers last night  No other acute events      ROS:  No cough,SOB,CP  No N/V/D  No abd pain  No urinary complaints  No HA  No joint or limb pain  No other complaints    PAST MEDICAL & SURGICAL HISTORY:  CVA (cerebrovascular accident)      HTN (hypertension)      DM (diabetes mellitus)      HTN (hypertension)        Allergies    No Known Allergies    Intolerances      Antimicrobials:    cefepime   IVPB      cefepime   IVPB 1000 milliGRAM(s) IV Intermittent every 8 hours    MEDICATIONS  (STANDING):  amLODIPine   Tablet 10 milliGRAM(s) Oral daily  aspirin enteric coated 81 milliGRAM(s) Oral daily  atorvastatin 80 milliGRAM(s) Oral at bedtime  cefepime   IVPB      cefepime   IVPB 1000 milliGRAM(s) IV Intermittent every 8 hours  chlorhexidine 2% Cloths 1 Application(s) Topical <User Schedule>  dextrose 50% Injectable 25 Gram(s) IV Push once  dextrose 50% Injectable 12.5 Gram(s) IV Push once  dextrose 50% Injectable 25 Gram(s) IV Push once  dextrose Oral Gel 15 Gram(s) Oral once  finasteride 5 milliGRAM(s) Oral daily  glucagon  Injectable 1 milliGRAM(s) IntraMuscular once  influenza   Vaccine 0.5 milliLiter(s) IntraMuscular once  insulin glargine Injectable (LANTUS) 24 Unit(s) SubCutaneous at bedtime  insulin lispro (ADMELOG) corrective regimen sliding scale   SubCutaneous three times a day before meals  insulin lispro (ADMELOG) corrective regimen sliding scale   SubCutaneous at bedtime  insulin lispro Injectable (ADMELOG) 8 Unit(s) SubCutaneous three times a day before meals  lactated ringers 1000 milliLiter(s) (250 mL/Hr) IV Continuous <Continuous>  potassium chloride    Tablet ER 40 milliEquivalent(s) Oral once  potassium chloride   Solution 40 milliEquivalent(s) Oral once  tamsulosin 0.8 milliGRAM(s) Oral at bedtime      Vital Signs Last 24 Hrs  T(C): 36.9 (12-27-22 @ 06:00), Max: 39.1 (12-27-22 @ 04:28)  T(F): 98.5 (12-27-22 @ 06:00), Max: 102.3 (12-27-22 @ 04:28)  HR: 100 (12-27-22 @ 04:28) (90 - 100)  BP: 142/77 (12-27-22 @ 04:28) (142/77 - 155/79)  BP(mean): --  RR: 17 (12-27-22 @ 04:28) (17 - 18)  SpO2: 96% (12-27-22 @ 04:28) (96% - 98%)    Physical Exam:    Constitutional well preserved,comfortable,pleasant    HEENT PERRLA EOMI,No pallor or icterus    No oral exudate or erythema    Neck supple no JVD or LN    Chest Good AE,CTA  no erythema around PPM site  no tenderness  biopsy site with bandage    CVS  S1 S2     Abd soft BS normal No tenderness     Ext No cyanosis clubbing or edema    IV site no erythema tenderness or discharge    Joints no swelling or LOM    CNS AAO X 3 no focal    Lab Data:                          11.8   7.00  )-----------( 188      ( 27 Dec 2022 06:23 )             37.0       12-27    138  |  106  |  19  ----------------------------<  173<H>  3.4<L>   |  22  |  1.13    Ca    8.7      27 Dec 2022 06:25  Phos  2.5     12-27  Mg     2.2     12-27    TPro  6.2  /  Alb  3.4  /  TBili  0.8  /  DBili  x   /  AST  34  /  ALT  37  /  AlkPhos  76  12-26          Clean Catch Clean Catch (Midstream)  12-22-22   <10,000 CFU/mL Normal Urogenital Tamela  --  --      .Blood Blood-Peripheral  12-22-22   No growth to date.  --  --        Respiratory Viral Panel with COVID-19 by FELIPA (12.22.22 @ 14:02)    Rapid RVP Result: [facility]    SARS-CoV-2: [facility]: This Respiratory Panel uses polymerase chain reaction (PCR) to detect for  adenovirus; coronavirus (HKU1, NL63, 229E, OC43); human metapneumovirus  (hMPV); human enterovirus/rhinovirus (Entero/RV); influenza A; influenza  A/H1; influenza A/H3; influenza A/H1-2009; influenza B; parainfluenza  viruses 1, 2, 3, 4; respiratory syncytial virus; Mycoplasma pneumoniae;  Chlamydophila pneumoniae; and SARS-CoV-2.        WBC Count: 7.00 (12-27-22 @ 06:23)  WBC Count: 7.21 (12-26-22 @ 05:49)  WBC Count: 8.40 (12-25-22 @ 06:34)  WBC Count: 10.14 (12-24-22 @ 08:45)  WBC Count: 9.87 (12-23-22 @ 06:41)  WBC Count: 11.11 (12-22-22 @ 13:33)  WBC Count: 9.63 (12-22-22 @ 06:18)  WBC Count: 7.59 (12-21-22 @ 06:58)      < from: CT Abdomen and Pelvis w/ IV Cont (12.27.22 @ 17:12) >    ACC: [id number] EXAM:  CT ABDOMEN AND PELVIS IC                        ACC: [id number] EXAM:  CT CHEST IC                          PROCEDURE DATE:  12/27/2022          INTERPRETATION:  CLINICAL INFORMATION: Recent biopsy of left   supraclavicular lymphnodes    COMPARISON: None.    CONTRAST/COMPLICATIONS:  IV Contrast: Omnipaque 350 (accession [id number]), IV contrast documented   in unlinked concurrent exam (accession [id number])  90 cc administered   10   cc discarded  Oral Contrast: NONE  Complications: None reported at time of study completion    PROCEDURE:  CT of the Chest, Abdomen and Pelvis was performed.  Sagittal and coronal reformats were performed.    FINDINGS:  CHEST:  LUNGS AND LARGE AIRWAYS: Patent central airways. No pulmonary nodules.  PLEURA: Small left and trace right pleural effusions.  VESSELS: Within normal limits.  HEART: Heart size is normal. No pericardial effusion.  MEDIASTINUM AND MEIR: No lymphadenopathy.  CHEST WALL AND LOWER NECK: Multinodular thyroid gland with substernal   extension of left lobe nodules. Left anterior chest wall AICD with lead   tips in the right atrium and right ventricle    ABDOMEN AND PELVIS:  LIVER: Within normal limits.  BILE DUCTS: Normal caliber.  GALLBLADDER: Contracted.  SPLEEN: Within normal limits.  PANCREAS: Within normal limits.  ADRENALS: Within normal limits.  KIDNEYS/URETERS: Bilateral cysts and subcentimeter indeterminant   hypodense foci, too small to characterize. Symmetric renal enhancement   without hydronephrosis..    BLADDER: A 9 x 8 mm soft tissue nodule at the bladder dome in the region   of the urachal remnant is noted.  REPRODUCTIVE ORGANS: Mildly enlarged prostate gland.    BOWEL: No bowel obstruction. Moderate fecal load in the colon. Appendix   is normal.  PERITONEUM: No ascites.  VESSELS: Atherosclerotic changes.  RETROPERITONEUM/LYMPH NODES: No lymphadenopathy.  ABDOMINAL WALL: Within normal limits.  BONES: Degenerative changes.    IMPRESSION:  Small left and trace right pleural effusions.    Multinodular thyroid goiter. Is correlate with recent biopsy results.    A 9 mm soft tissue nodule of the bladder dome in the region of the   urachal remnant. Cannot exclude small urachal neoplasm. Consider MRI with   contrast for further evaluation.        --- End of Report ---            TANIA SANTIAGO MD; Attending Radiologist  This document has been electronically signed. Dec 27 2022  7:22P    < end of copied text >

## 2022-12-27 NOTE — PRE PROCEDURE NOTE - PRE PROCEDURE EVALUATION
Interventional Radiology Pre Procedure Note    HPI: 57y Male with fevers of unknown origin and left suprclavicular/mediastinal lymph nodes. Biopsy is requested.     Allergies:   Medications (Abx/Cardiac/Anticoagulation/Blood Products)    amLODIPine   Tablet: 10 milliGRAM(s) Oral (12-27 @ 05:11)  aspirin enteric coated: 81 milliGRAM(s) Oral (12-26 @ 11:12)  cefepime   IVPB: 100 mL/Hr IV Intermittent (12-27 @ 05:10)    Data:  185.4  77.1  T(C): 36.9  HR: 86  BP: 146/74  RR: 18  SpO2: 96%    Exam  General: No acute distress  Chest: Non labored breathing    -WBC 7.00 / HgB 11.8 / Hct 37.0 / Plt 188  -Na 138 / Cl 106 / BUN 19 / Glucose 173  -K 3.4 / CO2 22 / Cr 1.13  -ALT -- / Alk Phos -- / T.Bili --  -INR1.21    Plan: 57y Male presents for left supraclavicular lymph node biopsy. Will target lymph node on CT with US. Procedure and risks discussed with patient and he is agreeable to proceed.   -Risks/Benefits/alternatives explained with the patient and/or healthcare proxy and witnessed informed consent obtained.   
Interventional Radiology    HPI: 57 year old Male with a PMH of CAD, CVA (2012 with no significant residual weakness), DM, p/w bradycardia. EKG concerning for 3:1, Mobitz type II AV block. CT chest: Mediastinal and supraclavicular lymph nodes. IR consulted for supraclavicular Lymph Node Biopsy    Allergies:   Medications (Abx/Cardiac/Anticoagulation/Blood Products)  amLODIPine   Tablet: 10 milliGRAM(s) Oral (12-20 @ 23:29)  aspirin enteric coated: 325 milliGRAM(s) Oral (12-20 @ 12:44)    Data:  177.8  77.1  T(C): 37.1  HR: 51  BP: 169/75  RR: 14  SpO2: 95%    Exam  General: No acute distress  Chest: Non labored breathing  Abdomen: Non-distended  Extremities: No swelling, warm    -WBC 7.59 / HgB 12.8 / Hct 40.4 / Plt 129  -Na 145 / Cl 109 / BUN 31 / Glucose 201  -K 3.3 / CO2 23 / Cr 1.50  -ALT 17 / Alk Phos 55 / T.Bili 0.2  -INR1.11    Plan: 57y Male presents for Lymph Node Biopsy  -Risks/Benefits/alternatives explained with the patient and/or healthcare proxy and witnessed informed consent obtained.

## 2022-12-28 VITALS — HEART RATE: 87 BPM | SYSTOLIC BLOOD PRESSURE: 143 MMHG | OXYGEN SATURATION: 97 % | DIASTOLIC BLOOD PRESSURE: 73 MMHG

## 2022-12-28 DIAGNOSIS — E04.1 NONTOXIC SINGLE THYROID NODULE: ICD-10-CM

## 2022-12-28 LAB
ANION GAP SERPL CALC-SCNC: 12 MMOL/L — SIGNIFICANT CHANGE UP (ref 5–17)
BUN SERPL-MCNC: 18 MG/DL — SIGNIFICANT CHANGE UP (ref 7–23)
CALCIUM SERPL-MCNC: 8.4 MG/DL — SIGNIFICANT CHANGE UP (ref 8.4–10.5)
CHLORIDE SERPL-SCNC: 105 MMOL/L — SIGNIFICANT CHANGE UP (ref 96–108)
CO2 SERPL-SCNC: 20 MMOL/L — LOW (ref 22–31)
CREAT SERPL-MCNC: 1.06 MG/DL — SIGNIFICANT CHANGE UP (ref 0.5–1.3)
EGFR: 83 ML/MIN/1.73M2 — SIGNIFICANT CHANGE UP
GLUCOSE BLDC GLUCOMTR-MCNC: 119 MG/DL — HIGH (ref 70–99)
GLUCOSE BLDC GLUCOMTR-MCNC: 139 MG/DL — HIGH (ref 70–99)
GLUCOSE BLDC GLUCOMTR-MCNC: 148 MG/DL — HIGH (ref 70–99)
GLUCOSE SERPL-MCNC: 160 MG/DL — HIGH (ref 70–99)
HCT VFR BLD CALC: 35.5 % — LOW (ref 39–50)
HGB BLD-MCNC: 11.2 G/DL — LOW (ref 13–17)
LDH SERPL L TO P-CCNC: 262 U/L — HIGH (ref 50–242)
MAGNESIUM SERPL-MCNC: 2.1 MG/DL — SIGNIFICANT CHANGE UP (ref 1.6–2.6)
MCHC RBC-ENTMCNC: 25.1 PG — LOW (ref 27–34)
MCHC RBC-ENTMCNC: 31.5 GM/DL — LOW (ref 32–36)
MCV RBC AUTO: 79.4 FL — LOW (ref 80–100)
NRBC # BLD: 0 /100 WBCS — SIGNIFICANT CHANGE UP (ref 0–0)
PHOSPHATE SERPL-MCNC: 2.5 MG/DL — SIGNIFICANT CHANGE UP (ref 2.5–4.5)
PLATELET # BLD AUTO: 213 K/UL — SIGNIFICANT CHANGE UP (ref 150–400)
POTASSIUM SERPL-MCNC: 3.9 MMOL/L — SIGNIFICANT CHANGE UP (ref 3.5–5.3)
POTASSIUM SERPL-SCNC: 3.9 MMOL/L — SIGNIFICANT CHANGE UP (ref 3.5–5.3)
RAPID RVP RESULT: SIGNIFICANT CHANGE UP
RBC # BLD: 4.47 M/UL — SIGNIFICANT CHANGE UP (ref 4.2–5.8)
RBC # FLD: 14.2 % — SIGNIFICANT CHANGE UP (ref 10.3–14.5)
SARS-COV-2 RNA SPEC QL NAA+PROBE: SIGNIFICANT CHANGE UP
SODIUM SERPL-SCNC: 137 MMOL/L — SIGNIFICANT CHANGE UP (ref 135–145)
WBC # BLD: 7.7 K/UL — SIGNIFICANT CHANGE UP (ref 3.8–10.5)
WBC # FLD AUTO: 7.7 K/UL — SIGNIFICANT CHANGE UP (ref 3.8–10.5)

## 2022-12-28 PROCEDURE — 85014 HEMATOCRIT: CPT

## 2022-12-28 PROCEDURE — 85610 PROTHROMBIN TIME: CPT

## 2022-12-28 PROCEDURE — 93005 ELECTROCARDIOGRAM TRACING: CPT

## 2022-12-28 PROCEDURE — 0225U NFCT DS DNA&RNA 21 SARSCOV2: CPT

## 2022-12-28 PROCEDURE — 70450 CT HEAD/BRAIN W/O DYE: CPT | Mod: MA

## 2022-12-28 PROCEDURE — 99233 SBSQ HOSP IP/OBS HIGH 50: CPT

## 2022-12-28 PROCEDURE — 82306 VITAMIN D 25 HYDROXY: CPT

## 2022-12-28 PROCEDURE — 85018 HEMOGLOBIN: CPT

## 2022-12-28 PROCEDURE — 80202 ASSAY OF VANCOMYCIN: CPT

## 2022-12-28 PROCEDURE — 97161 PT EVAL LOW COMPLEX 20 MIN: CPT

## 2022-12-28 PROCEDURE — 74177 CT ABD & PELVIS W/CONTRAST: CPT

## 2022-12-28 PROCEDURE — 84100 ASSAY OF PHOSPHORUS: CPT

## 2022-12-28 PROCEDURE — 86901 BLOOD TYPING SEROLOGIC RH(D): CPT

## 2022-12-28 PROCEDURE — 80053 COMPREHEN METABOLIC PANEL: CPT

## 2022-12-28 PROCEDURE — 71250 CT THORAX DX C-: CPT

## 2022-12-28 PROCEDURE — C1889: CPT

## 2022-12-28 PROCEDURE — 36415 COLL VENOUS BLD VENIPUNCTURE: CPT

## 2022-12-28 PROCEDURE — 71046 X-RAY EXAM CHEST 2 VIEWS: CPT

## 2022-12-28 PROCEDURE — 84132 ASSAY OF SERUM POTASSIUM: CPT

## 2022-12-28 PROCEDURE — 86900 BLOOD TYPING SEROLOGIC ABO: CPT

## 2022-12-28 PROCEDURE — 99231 SBSQ HOSP IP/OBS SF/LOW 25: CPT

## 2022-12-28 PROCEDURE — 82962 GLUCOSE BLOOD TEST: CPT

## 2022-12-28 PROCEDURE — 83930 ASSAY OF BLOOD OSMOLALITY: CPT

## 2022-12-28 PROCEDURE — 83036 HEMOGLOBIN GLYCOSYLATED A1C: CPT

## 2022-12-28 PROCEDURE — 82803 BLOOD GASES ANY COMBINATION: CPT

## 2022-12-28 PROCEDURE — 71260 CT THORAX DX C+: CPT

## 2022-12-28 PROCEDURE — U0005: CPT

## 2022-12-28 PROCEDURE — 70496 CT ANGIOGRAPHY HEAD: CPT | Mod: MA

## 2022-12-28 PROCEDURE — 88173 CYTOPATH EVAL FNA REPORT: CPT

## 2022-12-28 PROCEDURE — 76942 ECHO GUIDE FOR BIOPSY: CPT

## 2022-12-28 PROCEDURE — 86140 C-REACTIVE PROTEIN: CPT

## 2022-12-28 PROCEDURE — 86850 RBC ANTIBODY SCREEN: CPT

## 2022-12-28 PROCEDURE — 83605 ASSAY OF LACTIC ACID: CPT

## 2022-12-28 PROCEDURE — C1721: CPT

## 2022-12-28 PROCEDURE — 82652 VIT D 1 25-DIHYDROXY: CPT

## 2022-12-28 PROCEDURE — 76536 US EXAM OF HEAD AND NECK: CPT

## 2022-12-28 PROCEDURE — 71045 X-RAY EXAM CHEST 1 VIEW: CPT

## 2022-12-28 PROCEDURE — C1892: CPT

## 2022-12-28 PROCEDURE — 84295 ASSAY OF SERUM SODIUM: CPT

## 2022-12-28 PROCEDURE — C1777: CPT

## 2022-12-28 PROCEDURE — 88172 CYTP DX EVAL FNA 1ST EA SITE: CPT

## 2022-12-28 PROCEDURE — 87040 BLOOD CULTURE FOR BACTERIA: CPT

## 2022-12-28 PROCEDURE — 82164 ANGIOTENSIN I ENZYME TEST: CPT

## 2022-12-28 PROCEDURE — 93970 EXTREMITY STUDY: CPT

## 2022-12-28 PROCEDURE — 85652 RBC SED RATE AUTOMATED: CPT

## 2022-12-28 PROCEDURE — 84145 PROCALCITONIN (PCT): CPT

## 2022-12-28 PROCEDURE — 83615 LACTATE (LD) (LDH) ENZYME: CPT

## 2022-12-28 PROCEDURE — 99239 HOSP IP/OBS DSCHRG MGMT >30: CPT

## 2022-12-28 PROCEDURE — 85379 FIBRIN DEGRADATION QUANT: CPT

## 2022-12-28 PROCEDURE — 86480 TB TEST CELL IMMUN MEASURE: CPT

## 2022-12-28 PROCEDURE — 81001 URINALYSIS AUTO W/SCOPE: CPT

## 2022-12-28 PROCEDURE — 85027 COMPLETE CBC AUTOMATED: CPT

## 2022-12-28 PROCEDURE — 99285 EMERGENCY DEPT VISIT HI MDM: CPT

## 2022-12-28 PROCEDURE — 83735 ASSAY OF MAGNESIUM: CPT

## 2022-12-28 PROCEDURE — 93306 TTE W/DOPPLER COMPLETE: CPT

## 2022-12-28 PROCEDURE — 88305 TISSUE EXAM BY PATHOLOGIST: CPT

## 2022-12-28 PROCEDURE — 85025 COMPLETE CBC W/AUTO DIFF WBC: CPT

## 2022-12-28 PROCEDURE — 82985 ASSAY OF GLYCATED PROTEIN: CPT

## 2022-12-28 PROCEDURE — 82435 ASSAY OF BLOOD CHLORIDE: CPT

## 2022-12-28 PROCEDURE — 84443 ASSAY THYROID STIM HORMONE: CPT

## 2022-12-28 PROCEDURE — 87476 LYME DIS DNA AMP PROBE: CPT

## 2022-12-28 PROCEDURE — 87641 MR-STAPH DNA AMP PROBE: CPT

## 2022-12-28 PROCEDURE — 70498 CT ANGIOGRAPHY NECK: CPT | Mod: MA

## 2022-12-28 PROCEDURE — 82947 ASSAY GLUCOSE BLOOD QUANT: CPT

## 2022-12-28 PROCEDURE — 60100 BIOPSY OF THYROID: CPT

## 2022-12-28 PROCEDURE — 80061 LIPID PANEL: CPT

## 2022-12-28 PROCEDURE — U0003: CPT

## 2022-12-28 PROCEDURE — 86618 LYME DISEASE ANTIBODY: CPT

## 2022-12-28 PROCEDURE — 87640 STAPH A DNA AMP PROBE: CPT

## 2022-12-28 PROCEDURE — C1898: CPT

## 2022-12-28 PROCEDURE — 82330 ASSAY OF CALCIUM: CPT

## 2022-12-28 PROCEDURE — 80048 BASIC METABOLIC PNL TOTAL CA: CPT

## 2022-12-28 PROCEDURE — 33249 INSJ/RPLCMT DEFIB W/LEAD(S): CPT

## 2022-12-28 PROCEDURE — 87086 URINE CULTURE/COLONY COUNT: CPT

## 2022-12-28 PROCEDURE — 84484 ASSAY OF TROPONIN QUANT: CPT

## 2022-12-28 PROCEDURE — 85730 THROMBOPLASTIN TIME PARTIAL: CPT

## 2022-12-28 RX ORDER — AMLODIPINE BESYLATE 2.5 MG/1
1 TABLET ORAL
Qty: 0 | Refills: 0 | DISCHARGE

## 2022-12-28 RX ORDER — ATORVASTATIN CALCIUM 80 MG/1
1 TABLET, FILM COATED ORAL
Qty: 0 | Refills: 0 | DISCHARGE

## 2022-12-28 RX ORDER — LOSARTAN POTASSIUM 100 MG/1
1 TABLET, FILM COATED ORAL
Qty: 30 | Refills: 0
Start: 2022-12-28 | End: 2023-01-26

## 2022-12-28 RX ORDER — ALFUZOSIN HYDROCHLORIDE 10 MG/1
1 TABLET, EXTENDED RELEASE ORAL
Qty: 0 | Refills: 0 | DISCHARGE

## 2022-12-28 RX ORDER — METFORMIN HYDROCHLORIDE 850 MG/1
1 TABLET ORAL
Qty: 0 | Refills: 0 | DISCHARGE

## 2022-12-28 RX ORDER — LOSARTAN POTASSIUM 100 MG/1
1 TABLET, FILM COATED ORAL
Qty: 0 | Refills: 0 | DISCHARGE

## 2022-12-28 RX ORDER — FINASTERIDE 5 MG/1
1 TABLET, FILM COATED ORAL
Qty: 0 | Refills: 0 | DISCHARGE

## 2022-12-28 RX ORDER — ERTUGLIFLOZIN 5 MG/1
1 TABLET, FILM COATED ORAL
Qty: 0 | Refills: 0 | DISCHARGE

## 2022-12-28 RX ORDER — ATORVASTATIN CALCIUM 80 MG/1
1 TABLET, FILM COATED ORAL
Qty: 35 | Refills: 0
Start: 2022-12-28 | End: 2023-01-31

## 2022-12-28 RX ORDER — OXYCODONE HYDROCHLORIDE 5 MG/1
1 TABLET ORAL
Qty: 15 | Refills: 0
Start: 2022-12-28

## 2022-12-28 RX ORDER — ERTUGLIFLOZIN 5 MG/1
1 TABLET, FILM COATED ORAL
Qty: 0 | Refills: 0 | DISCHARGE
Start: 2022-12-28

## 2022-12-28 RX ORDER — METFORMIN HYDROCHLORIDE 850 MG/1
1 TABLET ORAL
Qty: 70 | Refills: 0
Start: 2022-12-28 | End: 2023-01-31

## 2022-12-28 RX ORDER — ALFUZOSIN HYDROCHLORIDE 10 MG/1
1 TABLET, EXTENDED RELEASE ORAL
Qty: 37 | Refills: 0
Start: 2022-12-28 | End: 2023-02-02

## 2022-12-28 RX ORDER — AMLODIPINE BESYLATE 2.5 MG/1
1 TABLET ORAL
Qty: 35 | Refills: 0
Start: 2022-12-28 | End: 2023-01-31

## 2022-12-28 RX ORDER — FINASTERIDE 5 MG/1
1 TABLET, FILM COATED ORAL
Qty: 30 | Refills: 0
Start: 2022-12-28 | End: 2023-01-26

## 2022-12-28 RX ORDER — ENOXAPARIN SODIUM 100 MG/ML
40 INJECTION SUBCUTANEOUS EVERY 24 HOURS
Refills: 0 | Status: DISCONTINUED | OUTPATIENT
Start: 2022-12-28 | End: 2022-12-28

## 2022-12-28 RX ADMIN — CEFEPIME 100 MILLIGRAM(S): 1 INJECTION, POWDER, FOR SOLUTION INTRAMUSCULAR; INTRAVENOUS at 13:17

## 2022-12-28 RX ADMIN — Medication 8 UNIT(S): at 13:18

## 2022-12-28 RX ADMIN — CEFEPIME 100 MILLIGRAM(S): 1 INJECTION, POWDER, FOR SOLUTION INTRAMUSCULAR; INTRAVENOUS at 06:27

## 2022-12-28 RX ADMIN — ENOXAPARIN SODIUM 40 MILLIGRAM(S): 100 INJECTION SUBCUTANEOUS at 08:49

## 2022-12-28 RX ADMIN — Medication 81 MILLIGRAM(S): at 13:18

## 2022-12-28 RX ADMIN — CLOPIDOGREL BISULFATE 75 MILLIGRAM(S): 75 TABLET, FILM COATED ORAL at 13:18

## 2022-12-28 RX ADMIN — Medication 8 UNIT(S): at 08:47

## 2022-12-28 RX ADMIN — CHLORHEXIDINE GLUCONATE 1 APPLICATION(S): 213 SOLUTION TOPICAL at 06:27

## 2022-12-28 RX ADMIN — FINASTERIDE 5 MILLIGRAM(S): 5 TABLET, FILM COATED ORAL at 13:18

## 2022-12-28 RX ADMIN — AMLODIPINE BESYLATE 10 MILLIGRAM(S): 2.5 TABLET ORAL at 06:27

## 2022-12-28 NOTE — PROGRESS NOTE ADULT - PROBLEM SELECTOR PLAN 3
-LAD seen on CT noncontrast   -pulm c/s, possible biopsy through ebus?  -rheum c/s: outpt f/u w/ biopsy result and lab work  -outpatient PETScan r/o Sarcoidosis (ordered in [employer]), will schedule as outpatient  -follicular thyroid tissue seen in biopsy sample  -will require repeat biopsy, scheduled for 12/27  -NPO at midnight 12/27 and hold AC morning of  -U/s of thyroid: Heterogeneous mass along the left lower pole, measuring 3.4 x 2.6 x 3.4 cm, possibly exophytic thyroid nodule. Will f/u Endocrine recommendations 12/28  - 12/27: Mediastinal Lymph Node Core biopsy today, will f/u results -LAD seen on CT noncontrast   -pulm c/s, possible biopsy through ebus?  -rheum c/s: outpt f/u w/ biopsy result and lab work  -outpatient PETScan r/o Sarcoidosis (ordered in [employer]), will schedule as outpatient  -follicular thyroid tissue seen in biopsy sample  -will require repeat biopsy, scheduled for 12/27  -NPO at midnight 12/27 and hold AC morning of  -U/s of thyroid: Heterogeneous mass along the left lower pole, measuring 3.4 x 2.6 x 3.4 cm, possibly exophytic thyroid nodule. Will f/u Endocrine recommendations 12/28  - 12/27: Mediastinal Lymph Node Core biopsy   - Patient will f/u results as outpatient

## 2022-12-28 NOTE — PROGRESS NOTE ADULT - ASSESSMENT
Patient is a 57 y M with a PMHx of CAD, CVA (2012 with no significant residual weakness), DM, p/w bradycardia    #Thyroid Nodules   Thyroid US demonstrates multiple nodules  RLP - 2.4 x 1.4 x 1.9 cm hyperechoic nodule  RLP - 1.6 x 1.1 x 1.6 cm isoechoic nodule  RMP and RUP subcentimeter cysts    LLP - 3.4 x 2.6 x 3.4 heterogenous, exophytic thyroid nodule   LLP - subcentimeter cyst     A left supraclavicular lymph node biopsy 12/21 demonstrates thyroid tissue with follicular architecture, with no lymph node elements seen, suggestive of ectopic thyroid tissue or the thyroid gland itself with nodule hyperplasia.   If this is truly a lymph node the possibilities would include a completely replaced lymph node, as no lymph node tissue is seen, with a follicular thyroid neoplasm. This is not favored based on the morphology however a remote possibility may exist but is highly unlikely    PLAN:   -Patient will need an FNA of dominant LLP thyroid nodule  -Agree with additional imaging per hematology/oncology to potentially sample another LN/lesion    #Uncontrolled T2DM, c/b CVA  A1c 6.3% Goal < 7%  Home regimen:  Metformin 1g BID, Steglatro 15 mg daily, Glipizide 5 mg daily    Inpatient Recs:   - BG Goal 100-180mg/dl   - FS TID AC qHS or q6h if NPO   -c/w Lantus 24 units qHS  -c/w Admelog 8 qAC. HOLD if NPO no pattern available for adjustment   -low dose correctional scale before each meal and low dose correctional scale at bedtime  -consistent carb diet  -FS qAC and qHS    For d/c:   -Metformin 1g BID + Steglatro 15 mg daily  -Stop Glipizide 5 mg daily  -Can fu with Dr. Medina    #HTN  BP Goal < 130/80  Continue management per primary team    #HLD  LDL goal < 70  Statin if no contraindications      Rose Pelaez MD  Endocrine Fellow  Can be reached via teams.     For all urgent matters, can call answering service at [phone] (weekdays); [phone] (nights/weekends)  Any non-urgent consults or questions can be emailed to [email or web address] or [email or web address]     Patient is a 57 y M with a PMHx of CAD, CVA (2012 with no significant residual weakness), DM, p/w bradycardia    #Thyroid Nodules   Thyroid US demonstrates multiple nodules  RLP - 2.4 x 1.4 x 1.9 cm hyperechoic nodule  RLP - 1.6 x 1.1 x 1.6 cm isoechoic nodule  RMP and RUP subcentimeter cysts    LLP - 3.4 x 2.6 x 3.4 heterogenous, exophytic thyroid nodule   LLP - subcentimeter cyst     A left supraclavicular lymph node biopsy 12/21 demonstrates thyroid tissue with follicular architecture, with no lymph node elements seen, suggestive of ectopic thyroid tissue or the thyroid gland itself with nodule hyperplasia.   If this is truly a lymph node the possibilities would include a completely replaced lymph node, as no lymph node tissue is seen, with a follicular thyroid neoplasm. This is not favored based on the morphology however a remote possibility may exist but is highly unlikely    PLAN:   -Patient will need an FNA of dominant LLP thyroid nodule  -Discussed with pathology - Papillary and medullary thyroid cancers can be identified via FNA, but follicular neoplasms cannot be ruled out with core biopsies alone. Agree with additional imaging per hematology/oncology to potentially elucidate source of lymphadenopathy and see if it is related to potentially metastatic thyroid cancer    #Uncontrolled T2DM, c/b CVA  A1c 6.3% Goal < 7%  Home regimen:  Metformin 1g BID, Steglatro 15 mg daily, Glipizide 5 mg daily    Inpatient Recs:   - BG Goal 100-180mg/dl   - FS TID AC qHS or q6h if NPO   -c/w Lantus 24 units qHS  -c/w Admelog 8 qAC. HOLD if NPO no pattern available for adjustment   -low dose correctional scale before each meal and low dose correctional scale at bedtime  -consistent carb diet  -FS qAC and qHS    For d/c:   -Metformin 1g BID + Steglatro 15 mg daily  -Stop Glipizide 5 mg daily  -Can fu with Dr. Medina    #HTN  BP Goal < 130/80  Continue management per primary team    #HLD  LDL goal < 70  Statin if no contraindications      Rose Pelaez MD  Endocrine Fellow  Can be reached via teams.     For all urgent matters, can call answering service at [phone] (weekdays); [phone] (nights/weekends)  Any non-urgent consults or questions can be emailed to [email or web address] or [email or web address]     Patient is a 57 y M with a PMHx of CAD, CVA (2012 with no significant residual weakness), DM, p/w bradycardia    #Thyroid Nodules   Thyroid US demonstrates multiple nodules  RLP - 2.4 x 1.4 x 1.9 cm hyperechoic nodule  RLP - 1.6 x 1.1 x 1.6 cm isoechoic nodule  RMP and RUP subcentimeter cysts    LLP - 3.4 x 2.6 x 3.4 heterogenous, exophytic thyroid nodule   LLP - subcentimeter cyst     A left supraclavicular lymph node biopsy 12/21 demonstrates thyroid tissue with follicular architecture, with no lymph node elements seen, suggestive of ectopic thyroid tissue or the thyroid gland itself with nodule hyperplasia.   If this is truly a lymph node the possibilities would include a completely replaced lymph node, as no lymph node tissue is seen, with a follicular thyroid neoplasm. This is not favored based on the morphology however a remote possibility may exist but is highly unlikely    PLAN:   -Patient will need an FNA of dominant LLP thyroid nodule, can be done as an outpatient   -Discussed with pathology - Papillary and medullary thyroid cancers can be identified via FNA, but follicular neoplasms cannot be ruled out with core biopsies alone. This may be a metastatic thyroid cancer. Agree with additional imaging per hematology/oncology to potentially elucidate source of lymphadenopathy and see if it is related to potentially metastatic thyroid cancer  -F/u results of second supraclavicular LN biopsy    #Uncontrolled T2DM, c/b CVA  A1c 6.3% Goal < 7%  Home regimen:  Metformin 1g BID, Steglatro 15 mg daily, Glipizide 5 mg daily    Inpatient Recs:   - BG Goal 100-180mg/dl   - FS TID AC qHS or q6h if NPO   -c/w Lantus 24 units qHS  -c/w Admelog 8 qAC. HOLD if NPO no pattern available for adjustment   -low dose correctional scale before each meal and low dose correctional scale at bedtime  -consistent carb diet  -FS qAC and qHS    For d/c:   -Metformin 1g BID + Steglatro 15 mg daily  -Stop Glipizide 5 mg daily  -Can fu with Dr. Medina    #HTN  BP Goal < 130/80  Continue management per primary team    #HLD  LDL goal < 70  Statin if no contraindications      Rose Pelaez MD  Endocrine Fellow  Can be reached via teams.     For all urgent matters, can call answering service at [phone] (weekdays); [phone] (nights/weekends)  Any non-urgent consults or questions can be emailed to [email or web address] or [email or web address]

## 2022-12-28 NOTE — PROGRESS NOTE ADULT - SUBJECTIVE AND OBJECTIVE BOX
Admitted for: Dizziness and giddiness    Following for: T2DM and thyroid nodules    Subjective: Patient feeling well today      MEDICATIONS  (STANDING):  amLODIPine   Tablet 10 milliGRAM(s) Oral daily  aspirin enteric coated 81 milliGRAM(s) Oral daily  atorvastatin 80 milliGRAM(s) Oral at bedtime  cefepime   IVPB      cefepime   IVPB 1000 milliGRAM(s) IV Intermittent every 8 hours  chlorhexidine 2% Cloths 1 Application(s) Topical <User Schedule>  clopidogrel Tablet 75 milliGRAM(s) Oral daily  dextrose 50% Injectable 25 Gram(s) IV Push once  dextrose 50% Injectable 25 Gram(s) IV Push once  dextrose 50% Injectable 12.5 Gram(s) IV Push once  dextrose Oral Gel 15 Gram(s) Oral once  enoxaparin Injectable 40 milliGRAM(s) SubCutaneous every 24 hours  finasteride 5 milliGRAM(s) Oral daily  glucagon  Injectable 1 milliGRAM(s) IntraMuscular once  influenza   Vaccine 0.5 milliLiter(s) IntraMuscular once  insulin glargine Injectable (LANTUS) 24 Unit(s) SubCutaneous at bedtime  insulin lispro (ADMELOG) corrective regimen sliding scale   SubCutaneous three times a day before meals  insulin lispro (ADMELOG) corrective regimen sliding scale   SubCutaneous at bedtime  insulin lispro Injectable (ADMELOG) 8 Unit(s) SubCutaneous three times a day before meals  potassium chloride   Solution 40 milliEquivalent(s) Oral once  tamsulosin 0.8 milliGRAM(s) Oral at bedtime    MEDICATIONS  (PRN):  acetaminophen     Tablet .. 650 milliGRAM(s) Oral every 6 hours PRN Temp greater or equal to 38C (100.4F), Mild Pain (1 - 3)  aluminum hydroxide/magnesium hydroxide/simethicone Suspension 30 milliLiter(s) Oral every 4 hours PRN Dyspepsia  atropine Injectable 1 milliGRAM(s) IV Push once PRN symptomatic bradycardia  melatonin 3 milliGRAM(s) Oral at bedtime PRN Insomnia  ondansetron Injectable 4 milliGRAM(s) IV Push every 8 hours PRN Nausea and/or Vomiting      Allergies    No Known Allergies    Intolerances          PHYSICAL EXAM:  VITALS: T(C): 37.1 (12-28-22 @ 11:21)  T(F): 98.7 (12-28-22 @ 11:21), Max: 99.3 (12-27-22 @ 17:30)  HR: 86 (12-28-22 @ 11:21) (85 - 95)  BP: 137/77 (12-28-22 @ 11:21) (137/77 - 163/87)  RR:  (17 - 20)  SpO2:  (95% - 100%)  Wt(kg): --  GENERAL: NAD  EYES: No proptosis, no lid lag, anicteric  RESPIRATORY: Clear to auscultation bilaterally  CARDIOVASCULAR: Regular rate and rhythm  GI: Soft, nontender, non distended  EXT: b/l feet without wounds, 2+ pulses  PSYCH: Alert and oriented x 3, reactive affect      A1C with Estimated Average Glucose Result: 6.3 % (12-20-22 @ 08:16)      POCT Blood Glucose.: 148 mg/dL (12-28-22 @ 11:56)  POCT Blood Glucose.: 139 mg/dL (12-28-22 @ 08:16)  POCT Blood Glucose.: 172 mg/dL (12-27-22 @ 21:44)  POCT Blood Glucose.: 253 mg/dL (12-27-22 @ 16:44)  POCT Blood Glucose.: 165 mg/dL (12-27-22 @ 11:18)  POCT Blood Glucose.: 169 mg/dL (12-27-22 @ 05:19)  POCT Blood Glucose.: 258 mg/dL (12-27-22 @ 00:28)  POCT Blood Glucose.: 157 mg/dL (12-26-22 @ 21:07)  POCT Blood Glucose.: 225 mg/dL (12-26-22 @ 16:25)  POCT Blood Glucose.: 146 mg/dL (12-26-22 @ 11:54)  POCT Blood Glucose.: 111 mg/dL (12-26-22 @ 07:47)  POCT Blood Glucose.: 130 mg/dL (12-25-22 @ 22:20)  POCT Blood Glucose.: 150 mg/dL (12-25-22 @ 16:31)      12-28    137  |  105  |  18  ----------------------------<  160<H>  3.9   |  20<L>  |  1.06    eGFR: 83    Ca    8.4      12-28  Mg     2.1     12-28  Phos  2.5     12-28    TPro  6.2  /  Alb  3.4  /  TBili  0.8  /  DBili  x   /  AST  34  /  ALT  37  /  AlkPhos  76  12-26      Thyroid Function Tests:  12-19 @ 23:47 TSH 0.60 FreeT4 -- T3 -- Anti TPO -- Anti Thyroglobulin Ab -- TSI --

## 2022-12-28 NOTE — PROGRESS NOTE ADULT - ASSESSMENT
56yo male with PMH of prior CVA with mild left sided deficits, HTN, DM, vertigo and HLD presents with Mobitz II 3:1 AVB, symptomatic. TTE nml Secondary workup most remarkable for CT mediastinal and supraclavicular LN, raising concern for Sarcoidosis    1. Mobitz II AV Block  2. R/o Sarcoidosis    - s/p Medtronic dual chamber ICD implant (12/21/22)  - Tmax temperature 37.4 overnight  - Post op fever noted unknown etiology, sepsis protocol initiated, BCx NGTD  - Monitor ICD wound site for bleeding, hematoma, redness, or drainage  - F/U repeat biopsy result with IR from 12/27  - Need outpatient PET Scan r/o Sarcoidosis (ordered in [employer]), scheduled for 1/4/23 at 10:15am @ [facility]  - Follow up with [facility] on 1/6/22 @ 2:40pm  - Continue telemetry monitoring  - Keep K 4.0 and Mg 2.0, supplement as needed    #[id number]

## 2022-12-28 NOTE — PHYSICAL THERAPY INITIAL EVALUATION ADULT - PERTINENT HX OF CURRENT PROBLEM, REHAB EVAL
Patient is a 57 y M with a PMHx of CAD, CVA (2012 with no significant residual weakness), DM, p/w bradycardia. The day prior to admission, pt reports feeling dizzy, off-balance, unable to walk. He also experienced nausea and had some episodes of vomiting. He went to [facility] where he was noted to be bradycardic, was transferred here. Upon presentation, HR in the 30s. EKG concerning for 3:1, Mobitz type II AV block. Pt was given Atropine x3, with HRs remaining in the 40s. Pt denies chest pain, dyspnea, abdominal pain. Pt does not regularly follow up with a cardiologist but had seen one (for the first time), 2 months after being discharged from the ED of an OSH for abdominal pain. Per patient report, stress test was normal.  Hosp Course: 12/19: CXR: No radiographic evidence of active chest disease. 12/20: CXR: clear lungs. CT HEAD: No acute intracranial hemorrhage, midline shift, or hydrocephalus. Chronic small vessel ischemic changes, including chronic appearing small right cerebellar fracture. 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 Further workup with MRA may be helpful in the evaluation for dissection. Multifocal severe narrowing of the left V4 segment. Atherosclerotic changes with moderate stenosis of the right carotid siphon and mild stenosis of the left carotid siphon. CTA NECK: Severe atherosclerotic narrowing at the origin of the left vertebral artery and mild to moderate narrowing involving both vertebral artery V2 segments. Noncalcified plaque with less than 50% stenosis at   the right proximal cervical ICA by NASCET criteria. CT chest: Mediastinal and supraclavicular lymph nodes, as described above. 12/21: ICD placement. Supraclavicular Lymph Node Biopsy. CXR: Left chest wall ICD with intact leads with tips in the expected region of the right atrium and right ventricle. No pneumothorax.  The visualized lungs are clear. 12/22: CT head: No evidence of intracranial hemorrhage. No change 12/20/2022. 12/23: US thyroid: Heterogeneous mass along the left lower pole, measuring 3.4 x 2.6 x 3.4   cm, possibly exophytic thyroid nodule. Additional right TI-RAD nodules, measuring up to 2.4 cm (TI-RAD 3). TI-RAD 3: Mildly suspicious (FNA if > 2.5 cm, Follow if > 1.5 cm) 12/27: Left supraclavicular lymph node biopsy. CT chest/abdomen/pelvis: Small left and trace right pleural effusions. Multinodular thyroid goiter. Is correlate with recent biopsy results. A 9 mm soft tissue nodule of the bladder dome in the region of the urachal remnant. Cannot exclude small urachal neoplasm. Consider MRI with contrast for further evaluation. VA UE/LE: No evidence of deep venous thrombosis in either upper extremity. Left mid to distal arm basilic vein thrombosis. No evidence of deep venous thrombosis in either lower extremity.

## 2022-12-28 NOTE — PROGRESS NOTE ADULT - PROBLEM/PLAN-2
DISPLAY PLAN FREE TEXT
multiple medical complaints
DISPLAY PLAN FREE TEXT

## 2022-12-28 NOTE — PHYSICAL THERAPY INITIAL EVALUATION ADULT - NSPTDMEREC_GEN_A_CORE
Pt requires a RW 2/2 to LOB 2/2 to left sided weakness requiring assistance when ambulating, RW would improve his independence with ambulation/rolling walker

## 2022-12-28 NOTE — PROGRESS NOTE ADULT - PROBLEM SELECTOR PLAN 7
Diet: dash diet  DVT: none  Dispo: home

## 2022-12-28 NOTE — PROGRESS NOTE ADULT - SUBJECTIVE AND OBJECTIVE BOX
Dot Muse, PGY1      Patient is a 57y old  Male who presents with a chief complaint of bradycardia (28 Dec 2022 08:03)    SUBJECTIVE/INTERVAL EVENTS: Patient seen and examined at bedside. No fevers/chills/night sweats overnight. Feeling well.     MEDICATIONS  (STANDING):  amLODIPine   Tablet 10 milliGRAM(s) Oral daily  aspirin enteric coated 81 milliGRAM(s) Oral daily  atorvastatin 80 milliGRAM(s) Oral at bedtime  cefepime   IVPB      cefepime   IVPB 1000 milliGRAM(s) IV Intermittent every 8 hours  chlorhexidine 2% Cloths 1 Application(s) Topical <User Schedule>  clopidogrel Tablet 75 milliGRAM(s) Oral daily  dextrose 50% Injectable 25 Gram(s) IV Push once  dextrose 50% Injectable 25 Gram(s) IV Push once  dextrose 50% Injectable 12.5 Gram(s) IV Push once  dextrose Oral Gel 15 Gram(s) Oral once  enoxaparin Injectable 40 milliGRAM(s) SubCutaneous every 24 hours  finasteride 5 milliGRAM(s) Oral daily  glucagon  Injectable 1 milliGRAM(s) IntraMuscular once  influenza   Vaccine 0.5 milliLiter(s) IntraMuscular once  insulin glargine Injectable (LANTUS) 24 Unit(s) SubCutaneous at bedtime  insulin lispro (ADMELOG) corrective regimen sliding scale   SubCutaneous three times a day before meals  insulin lispro (ADMELOG) corrective regimen sliding scale   SubCutaneous at bedtime  insulin lispro Injectable (ADMELOG) 8 Unit(s) SubCutaneous three times a day before meals  potassium chloride   Solution 40 milliEquivalent(s) Oral once  tamsulosin 0.8 milliGRAM(s) Oral at bedtime    MEDICATIONS  (PRN):  acetaminophen     Tablet .. 650 milliGRAM(s) Oral every 6 hours PRN Temp greater or equal to 38C (100.4F), Mild Pain (1 - 3)  aluminum hydroxide/magnesium hydroxide/simethicone Suspension 30 milliLiter(s) Oral every 4 hours PRN Dyspepsia  atropine Injectable 1 milliGRAM(s) IV Push once PRN symptomatic bradycardia  melatonin 3 milliGRAM(s) Oral at bedtime PRN Insomnia  ondansetron Injectable 4 milliGRAM(s) IV Push every 8 hours PRN Nausea and/or Vomiting      VITAL SIGNS:  T(F): 98.3 (12-28-22 @ 04:17), Max: 99.3 (12-27-22 @ 13:00)  HR: 95 (12-28-22 @ 04:17) (85 - 95)  BP: 150/77 (12-28-22 @ 04:17) (146/74 - 163/87)  RR: 18 (12-28-22 @ 04:17) (17 - 20)  SpO2: 98% (12-28-22 @ 04:17) (95% - 100%)    I&O's Summary    27 Dec 2022 07:01  -  28 Dec 2022 07:00  --------------------------------------------------------  IN: 50 mL / OUT: 0 mL / NET: 50 mL      Daily Height in cm: 185.42 (27 Dec 2022 11:58)    Daily     PHYSICAL EXAM:  Gen: Alert, NAD  HEENT: NCAT, conjunctiva clear, sclera anicteric, no erythema or exudates in the oropharynx, mmm  Neck: Supple, no JVD  CV: RRR, S1S2, no m/r/g  Resp: CTAB, normal respiratory effort  Abd: Soft, nontender, nondistended, normal bowel sounds  Ext: no edema, no clubbing or cyanosis  Neuro: AOx3, CN2-12 grossly intact, GALLEGOS  SKIN: warm, perfused    LABS:                        11.2   7.70  )-----------( 213      ( 28 Dec 2022 05:57 )             35.5     Hgb Trend: 11.2<--, 11.8<--, 12.1<--, 12.3<--, 12.8<--  12-28    137  |  105  |  18  ----------------------------<  160<H>  3.9   |  20<L>  |  1.06    Ca    8.4      28 Dec 2022 05:57  Phos  2.5     12-28  Mg     2.1     12-28      Creatinine Trend: 1.06<--, 1.13<--, 1.11<--, 1.13<--, 1.10<--, 1.24<--    PT/INR - ( 27 Dec 2022 06:24 )   PT: 13.9 sec;   INR: 1.21 ratio         PTT - ( 27 Dec 2022 06:24 )  PTT:28.6 sec          CAPILLARY BLOOD GLUCOSE      POCT Blood Glucose.: 139 mg/dL (28 Dec 2022 08:16)  POCT Blood Glucose.: 172 mg/dL (27 Dec 2022 21:44)  POCT Blood Glucose.: 253 mg/dL (27 Dec 2022 16:44)  POCT Blood Glucose.: 165 mg/dL (27 Dec 2022 11:18)      RADIOLOGY & ADDITIONAL TESTS: Reviewed    Imaging Personally Reviewed:    Consultant(s) Notes Reviewed:      Care Discussed with Consultants/Other Providers:

## 2022-12-28 NOTE — PROGRESS NOTE ADULT - PROVIDER SPECIALTY LIST ADULT
Infectious Disease
Electrophysiology
Electrophysiology
Infectious Disease
Electrophysiology
Rheumatology
Electrophysiology
Electrophysiology
Intervent Radiology
Intervent Radiology
Neurology
Endocrinology
Endocrinology
Internal Medicine

## 2022-12-28 NOTE — PROGRESS NOTE ADULT - SUBJECTIVE AND OBJECTIVE BOX
24H hour events:   Resting comfortably in bed;     MEDICATIONS:  amLODIPine   Tablet 10 milliGRAM(s) Oral daily  aspirin enteric coated 81 milliGRAM(s) Oral daily  clopidogrel Tablet 75 milliGRAM(s) Oral daily  enoxaparin Injectable 40 milliGRAM(s) SubCutaneous every 24 hours  cefepime   IVPB      cefepime   IVPB 1000 milliGRAM(s) IV Intermittent every 8 hours  acetaminophen     Tablet .. 650 milliGRAM(s) Oral every 6 hours PRN  melatonin 3 milliGRAM(s) Oral at bedtime PRN  ondansetron Injectable 4 milliGRAM(s) IV Push every 8 hours PRN  aluminum hydroxide/magnesium hydroxide/simethicone Suspension 30 milliLiter(s) Oral every 4 hours PRN  atropine Injectable 1 milliGRAM(s) IV Push once PRN  atorvastatin 80 milliGRAM(s) Oral at bedtime  dextrose 50% Injectable 25 Gram(s) IV Push once  dextrose 50% Injectable 25 Gram(s) IV Push once  dextrose 50% Injectable 12.5 Gram(s) IV Push once  dextrose Oral Gel 15 Gram(s) Oral once  finasteride 5 milliGRAM(s) Oral daily  glucagon  Injectable 1 milliGRAM(s) IntraMuscular once  insulin glargine Injectable (LANTUS) 24 Unit(s) SubCutaneous at bedtime  insulin lispro (ADMELOG) corrective regimen sliding scale   SubCutaneous three times a day before meals  insulin lispro (ADMELOG) corrective regimen sliding scale   SubCutaneous at bedtime  insulin lispro Injectable (ADMELOG) 8 Unit(s) SubCutaneous three times a day before meals  chlorhexidine 2% Cloths 1 Application(s) Topical <User Schedule>  influenza   Vaccine 0.5 milliLiter(s) IntraMuscular once  potassium chloride   Solution 40 milliEquivalent(s) Oral once  tamsulosin 0.8 milliGRAM(s) Oral at bedtime      REVIEW OF SYSTEMS:  Complete 12point ROS negative.    PHYSICAL EXAM:  T(C): 36.8 (12-28-22 @ 04:17), Max: 37.4 (12-27-22 @ 13:00)  HR: 95 (12-28-22 @ 04:17) (85 - 95)  BP: 150/77 (12-28-22 @ 04:17) (146/74 - 163/87)  RR: 18 (12-28-22 @ 04:17) (17 - 20)  SpO2: 98% (12-28-22 @ 04:17) (95% - 100%)  Wt(kg): --  I&O's Summary    27 Dec 2022 07:01  -  28 Dec 2022 07:00  --------------------------------------------------------  IN: 50 mL / OUT: 0 mL / NET: 50 mL        Appearance: Normal, in NAD	  Head: nomocephalic, atraumatic	  Lymphatic: No lymphadenopathy  Cardiovascular: Normal S1 S2, No JVD, No murmurs, No edema  Respiratory: Lungs clear to auscultation	  Psychiatry: A & O x 3, Mood & affect appropriate  Gastrointestinal:  Soft, Non-tender, + BS	  Skin: No rashes, No ecchymoses, No cyanosis	  Neurologic: Non-focal  Extremities: Normal range of motion, No clubbing, cyanosis or edema  Vascular: Peripheral pulses palpable 2+ bilaterally        LABS:	 	    CBC Full  -  ( 28 Dec 2022 05:57 )  WBC Count : 7.70 K/uL  Hemoglobin : 11.2 g/dL  Hematocrit : 35.5 %  Platelet Count - Automated : 213 K/uL  Mean Cell Volume : 79.4 fl  Mean Cell Hemoglobin : 25.1 pg  Mean Cell Hemoglobin Concentration : 31.5 gm/dL  Auto Neutrophil # : x  Auto Lymphocyte # : x  Auto Monocyte # : x  Auto Eosinophil # : x  Auto Basophil # : x  Auto Neutrophil % : x  Auto Lymphocyte % : x  Auto Monocyte % : x  Auto Eosinophil % : x  Auto Basophil % : x    12-28    137  |  105  |  18  ----------------------------<  160<H>  3.9   |  20<L>  |  1.06  12-27    138  |  106  |  19  ----------------------------<  173<H>  3.4<L>   |  22  |  1.13    Ca    8.4      28 Dec 2022 05:57  Ca    8.7      27 Dec 2022 06:25  Phos  2.5     12-28  Phos  2.5     12-27  Mg     2.1     12-28  Mg     2.2     12-27        proBNP:   Lipid Profile:   HgA1c:   TSH:       CARDIAC MARKERS:          TELEMETRY: 	    ECG:  	  RADIOLOGY:  OTHER: 	    PREVIOUS DIAGNOSTIC TESTING:    [ ] Echocardiogram:    [ ]  Catheterization:  [ ] Stress Test:  	  	  ASSESSMENT/PLAN: 	     24H hour events:   Resting comfortably in bed; feels good and denies any complaints at this time    MEDICATIONS:  amLODIPine   Tablet 10 milliGRAM(s) Oral daily  aspirin enteric coated 81 milliGRAM(s) Oral daily  clopidogrel Tablet 75 milliGRAM(s) Oral daily  enoxaparin Injectable 40 milliGRAM(s) SubCutaneous every 24 hours  cefepime   IVPB      cefepime   IVPB 1000 milliGRAM(s) IV Intermittent every 8 hours  acetaminophen     Tablet .. 650 milliGRAM(s) Oral every 6 hours PRN  melatonin 3 milliGRAM(s) Oral at bedtime PRN  ondansetron Injectable 4 milliGRAM(s) IV Push every 8 hours PRN  aluminum hydroxide/magnesium hydroxide/simethicone Suspension 30 milliLiter(s) Oral every 4 hours PRN  atropine Injectable 1 milliGRAM(s) IV Push once PRN  atorvastatin 80 milliGRAM(s) Oral at bedtime  dextrose 50% Injectable 25 Gram(s) IV Push once  dextrose 50% Injectable 25 Gram(s) IV Push once  dextrose 50% Injectable 12.5 Gram(s) IV Push once  dextrose Oral Gel 15 Gram(s) Oral once  finasteride 5 milliGRAM(s) Oral daily  glucagon  Injectable 1 milliGRAM(s) IntraMuscular once  insulin glargine Injectable (LANTUS) 24 Unit(s) SubCutaneous at bedtime  insulin lispro (ADMELOG) corrective regimen sliding scale   SubCutaneous three times a day before meals  insulin lispro (ADMELOG) corrective regimen sliding scale   SubCutaneous at bedtime  insulin lispro Injectable (ADMELOG) 8 Unit(s) SubCutaneous three times a day before meals  chlorhexidine 2% Cloths 1 Application(s) Topical <User Schedule>  influenza   Vaccine 0.5 milliLiter(s) IntraMuscular once  potassium chloride   Solution 40 milliEquivalent(s) Oral once  tamsulosin 0.8 milliGRAM(s) Oral at bedtime      REVIEW OF SYSTEMS:  Complete 12point ROS negative.    PHYSICAL EXAM:  T(C): 36.8 (12-28-22 @ 04:17), Max: 37.4 (12-27-22 @ 13:00)  HR: 95 (12-28-22 @ 04:17) (85 - 95)  BP: 150/77 (12-28-22 @ 04:17) (146/74 - 163/87)  RR: 18 (12-28-22 @ 04:17) (17 - 20)  SpO2: 98% (12-28-22 @ 04:17) (95% - 100%)  Wt(kg): --  I&O's Summary    27 Dec 2022 07:01  -  28 Dec 2022 07:00  --------------------------------------------------------  IN: 50 mL / OUT: 0 mL / NET: 50 mL        Appearance: Normal, in NAD	  Head: nomocephalic, atraumatic	  Neck: supple  Cardiovascular: RRR S1 S2, No JVD, No m/r/g  Respiratory: Lungs clear to auscultation	  Psychiatry: A & O x 3, Mood & affect appropriate  Gastrointestinal:  Soft, Non-tender, + BS  : voiding	  Skin: No rashes, No ecchymoses; left ICD wound site without redness or drainage or hematoma  Neurologic: Non-focal  Extremities: Normal range of motion, no c/c/e  Vascular: Peripheral pulses palpable 2+ bilaterally        LABS:	 	    CBC Full  -  ( 28 Dec 2022 05:57 )  WBC Count : 7.70 K/uL  Hemoglobin : 11.2 g/dL  Hematocrit : 35.5 %  Platelet Count - Automated : 213 K/uL  Mean Cell Volume : 79.4 fl  Mean Cell Hemoglobin : 25.1 pg  Mean Cell Hemoglobin Concentration : 31.5 gm/dL  Auto Neutrophil # : x  Auto Lymphocyte # : x  Auto Monocyte # : x  Auto Eosinophil # : x  Auto Basophil # : x  Auto Neutrophil % : x  Auto Lymphocyte % : x  Auto Monocyte % : x  Auto Eosinophil % : x  Auto Basophil % : x    12-28    137  |  105  |  18  ----------------------------<  160<H>  3.9   |  20<L>  |  1.06  12-27    138  |  106  |  19  ----------------------------<  173<H>  3.4<L>   |  22  |  1.13    Ca    8.4      28 Dec 2022 05:57  Ca    8.7      27 Dec 2022 06:25  Phos  2.5     12-28  Phos  2.5     12-27  Mg     2.1     12-28  Mg     2.2     12-27        proBNP:   Lipid Profile:   HgA1c:   TSH:       CARDIAC MARKERS:      TELEMETRY: Ghulam Vpaced 80-90s      TTE 12/20/22:    Dimensions:    Normal Values:  LA:     3.5    2.0 - 4.0 cm  Ao:     3.2    2.0 - 3.8 cm  SEPTUM: 1.0    0.6 - 1.2 cm  PWT:    1.0    0.6 - 1.1 cm  LVIDd:  4.5    3.0 - 5.6 cm  LVIDs:  2.9    1.8 - 4.0 cm  Derived variables:  LVMI: 85 g/m2  RWT: 0.48  Fractional short: 36 %  EF (Modified Mayers Rule): 57 %Doppler Peak Velocity  (m/sec): AoV=2.2  ------------------------------------------------------------------------  Observations:  Mitral Valve: Mitral annular calcification and calcified  mitral leaflets with normal diastolic opening. Mild mitral  regurgitation.  Aortic Valve/Aorta: Calcified trileaflet aortic valve with  normal opening. Peak transaortic valve gradient equals 20  mm Hg, mean transaortic valve gradient equals 9 mm Hg,  aortic valve velocity time integral equals 47 cm, estimated  aortic valve area equals 2.1 sqcm. No aortic valve  regurgitation seen. Peak left ventricular outflow tract  gradient equals 8 mm Hg, mean gradient is equal to 5 mm Hg,  LVOT velocity time integral equals 37 cm.  Aortic Root: 3.2 cm.  LVOT diameter: 1.7 cm.  Left Atrium: Normal left atrium.  LA volume index = 27  cc/m2.  Left Ventricle: Normal left ventricular systolic function.  No segmental wall motion abnormalities. Increased relative  wall thickness with normal left ventricular mass index,  consistent with concentric left ventricular remodeling.  Normal diastolic function.  Right Heart: Normal right atrium. Normal right ventricular  size and function. Normal tricuspid valve. Mild tricuspid  regurgitation. Normal pulmonic valve. No pulmonic  regurgitation.  Pericardium/Pleura: Normal pericardium with no pericardial  effusion.  Hemodynamic: Estimated right atrial pressure is 8 mm Hg.  Estimated right ventricular systolic pressure equals 35 mm  Hg, assuming right atrial pressure equals 8 mm Hg,  consistent with borderline pulmonary hypertension.  ------------------------------------------------------------------------  Conclusions:  1. Mitral annular calcification and calcified mitral  leaflets with normal diastolic opening. Mild mitral  regurgitation.  2. Calcified trileaflet aortic valve with normal opening.  No aortic valve regurgitation seen.  3. Normal left ventricular systolic function. No segmental  wall motion abnormalities.  4. Normal diastolic function.  5. Normal right ventricular size and function.

## 2022-12-28 NOTE — PHYSICAL THERAPY INITIAL EVALUATION ADULT - ADDITIONAL COMMENTS
Pt lives in pt home with 7 steps to enter with handrail on L side ascending. Pt is ind in all functional mobility with no AD. Pt presents with occasional LOB 2/2 to L sided residual weakness from past CVA.

## 2022-12-28 NOTE — PHYSICAL THERAPY INITIAL EVALUATION ADULT - GENERAL OBSERVATIONS, REHAB EVAL
Rec'd supine in bed with +IV RUE, +IVL LUE and +tele. Rec'd supine in bed with +IV RUE, +IVL LUE, +spouse at bedside and +tele.

## 2022-12-28 NOTE — PROGRESS NOTE ADULT - PROBLEM SELECTOR PLAN 2
-EKG showed 3:1, Mobitz type II AV block. Pt was given Atropine x3, with HRs remaining in the 40s.  -no prior history   -TSH wnl  -TTE  -ep following, appreciate recs  -CT chest noncon: mediastinal LAD  - s/p ICD implant (Medtronic) 12/21/22  - tenderness/erythema over the ICD implant area -EKG showed 3:1, Mobitz type II AV block. Pt was given Atropine x3, with HRs remaining in the 40s.  -no prior history   -TSH wnl  -TTE  -ep following, appreciate recs  -CT chest noncon: mediastinal LAD  - s/p ICD implant (Medtronic) 12/21/22  - tenderness/erythema over the ICD implant area, now resolved

## 2022-12-28 NOTE — PROGRESS NOTE ADULT - SUBJECTIVE AND OBJECTIVE BOX
Interventional Radiology Follow-Up Note.    Patient seen and examined @ bedside around 6:30am.    This is a 57y Male s/p left supraclavicular lymph node biopsy on 12/27/22 in Interventional Radiology.    No complaint offered.      Medications:     amLODIPine   Tablet: (12-28)  aspirin enteric coated: (12-27)  cefepime   IVPB: (12-28)    Vitals:  T(F): 98.3, Max: 99.3 (13:00)  HR: 95  BP: 150/77  RR: 18  SpO2: 98%    Physical Exam:  General: Nontoxic, in NAD.  left neck: supraclavicular biopsy site dressing c/d/i. No ttp.           LABS:  Na: 137 (12-28 @ 05:57), 138 (12-27 @ 06:25), 141 (12-26 @ 05:49)  K: 3.9 (12-28 @ 05:57), 3.4 (12-27 @ 06:25), 3.5 (12-26 @ 05:49)  Cl: 105 (12-28 @ 05:57), 106 (12-27 @ 06:25), 107 (12-26 @ 05:49)  CO2: 20 (12-28 @ 05:57), 22 (12-27 @ 06:25), 21 (12-26 @ 05:49)  BUN: 18 (12-28 @ 05:57), 19 (12-27 @ 06:25), 18 (12-26 @ 05:49)  Cr: 1.06 (12-28 @ 05:57), 1.13 (12-27 @ 06:25), 1.11 (12-26 @ 05:49)  Glu: 160(12-28 @ 05:57), 173(12-27 @ 06:25), 104(12-26 @ 05:49)    Hgb: 11.2 (12-28 @ 05:57), 11.8 (12-27 @ 06:23), 12.1 (12-26 @ 05:49)  Hct: 35.5 (12-28 @ 05:57), 37.0 (12-27 @ 06:23), 37.4 (12-26 @ 05:49)  WBC: 7.70 (12-28 @ 05:57), 7.00 (12-27 @ 06:23), 7.21 (12-26 @ 05:49)  Plt: 213 (12-28 @ 05:57), 188 (12-27 @ 06:23), 170 (12-26 @ 05:49)    INR: 1.21 12-27-22 @ 06:24  PTT: 28.6 12-27-22 @ 06:24                     Assessment/Plan: 57 year old Male with a PMH of CAD, CVA (2012 with no significant residual weakness), DM, p/w bradycardia. EKG concerning for 3:1, Mobitz type II AV block. CT chest: Mediastinal and supraclavicular lymph nodes.  Pt most recently s/p left supraclavicular lymph node biopsy on 12/27/22 in Interventional Radiology.      - f/u biopsy results  - Dressing removed  - IR will sign off    Please call IR at [phone] with any questions, concerns, or issues regarding above.    Also available on Teams.

## 2022-12-28 NOTE — PROGRESS NOTE ADULT - NS ATTEND AMEND GEN_ALL_CORE FT
seen and agree  awaiting results of repeat Bx and PET CT
Patient seen at bedside on 4 Garth with wife at bedside. Spoke with ID. The patient's fever would be a very atypical presentation for a pocket infection. Fevers started < 24 hours after the device was implanted. Mild redness over the site noted. I do not feel that this represents an infection. Biopsy consistent with thyroid tissue. Need further workup. We are working on scheduling an outpatient PET.    MAIDA Foote
Patient seen and examined at bedside this morning. I personally reviewed his portable CXR. He needs a PA / Lat CXR prior to discharge. Unclear explanation for his fever. Medicine working up and covering him with broad spectrum antibiotics. Plan for outpatient PET. Follow-up results of biopsy performed by IR on 12/21.    MAIDA Foote
seen and agree
seen and agree  unclear if infectious, awaiting repeat biopsy

## 2022-12-28 NOTE — PROGRESS NOTE ADULT - REASON FOR ADMISSION
bradycardia

## 2022-12-28 NOTE — PROGRESS NOTE ADULT - PROBLEM SELECTOR PLAN 1
- febrile to 103, new clinical finding  - full set of labs ordered and vbg w/ lactate  - UA urine culture blood culture  - will initiate ABX after cultures drawn (spiked through cefazolin), will do vanc and zosyn  - vanc (12/22-?) + zosyn (12/22)  - cefepime (12/23-12/28)  - MRSA swab - RESOLVED  - was on initial admission febrile to 103, new clinical finding  - cultures NGTD  - s/p vanc, zosyn, d/c vanc after MRSA-   - cefepime (12/23-12/28 last day)  - ok to d/c all antibiotic therapy currently  - fevers may be 2/2 to malignant or autoimmune process

## 2022-12-28 NOTE — PHYSICAL THERAPY INITIAL EVALUATION ADULT - PLANNED THERAPY INTERVENTIONS, PT EVAL
GOAL: Pt will negotiate up/down 7 steps with L handrail ascending independently in 2 weeks/balance training/gait training/strengthening Intermediate Repair Preamble Text (Leave Blank If You Do Not Want): Undermining was performed with blunt dissection.

## 2022-12-29 LAB — NON-GYNECOLOGICAL CYTOLOGY STUDY: SIGNIFICANT CHANGE UP

## 2022-12-29 NOTE — CHART NOTE - NSCHARTNOTESELECT_GEN_ALL_CORE
d/c appt/Event Note
d/c appt/Event Note
Endocrinology/Event Note
Event Note
Event Note
Internal medicine/Event Note
d/c appt/Event Note

## 2023-01-01 LAB
CULTURE RESULTS: SIGNIFICANT CHANGE UP
CULTURE RESULTS: SIGNIFICANT CHANGE UP
SPECIMEN SOURCE: SIGNIFICANT CHANGE UP
SPECIMEN SOURCE: SIGNIFICANT CHANGE UP

## 2023-01-03 ENCOUNTER — APPOINTMENT (OUTPATIENT)
Dept: ELECTROPHYSIOLOGY | Facility: CLINIC | Age: 58
End: 2023-01-03
Payer: MEDICAID

## 2023-01-03 ENCOUNTER — NON-APPOINTMENT (OUTPATIENT)
Age: 58
End: 2023-01-03

## 2023-01-03 VITALS
SYSTOLIC BLOOD PRESSURE: 119 MMHG | OXYGEN SATURATION: 99 % | WEIGHT: 169 LBS | HEIGHT: 72 IN | HEART RATE: 108 BPM | DIASTOLIC BLOOD PRESSURE: 67 MMHG | BODY MASS INDEX: 22.89 KG/M2

## 2023-01-03 PROBLEM — I10 ESSENTIAL (PRIMARY) HYPERTENSION: Chronic | Status: ACTIVE | Noted: 2022-12-20

## 2023-01-03 PROBLEM — E11.9 TYPE 2 DIABETES MELLITUS WITHOUT COMPLICATIONS: Chronic | Status: ACTIVE | Noted: 2022-12-20

## 2023-01-03 PROBLEM — I63.9 CEREBRAL INFARCTION, UNSPECIFIED: Chronic | Status: ACTIVE | Noted: 2022-12-20

## 2023-01-03 PROCEDURE — 99024 POSTOP FOLLOW-UP VISIT: CPT

## 2023-01-03 PROCEDURE — 93000 ELECTROCARDIOGRAM COMPLETE: CPT | Mod: 59

## 2023-01-17 ENCOUNTER — APPOINTMENT (OUTPATIENT)
Dept: ELECTROPHYSIOLOGY | Facility: CLINIC | Age: 58
End: 2023-01-17

## 2023-01-20 ENCOUNTER — APPOINTMENT (OUTPATIENT)
Dept: NUCLEAR MEDICINE | Facility: IMAGING CENTER | Age: 58
End: 2023-01-20
Payer: MEDICAID

## 2023-01-20 ENCOUNTER — OUTPATIENT (OUTPATIENT)
Dept: OUTPATIENT SERVICES | Facility: HOSPITAL | Age: 58
LOS: 1 days | End: 2023-01-20
Payer: MEDICAID

## 2023-01-20 ENCOUNTER — RESULT REVIEW (OUTPATIENT)
Age: 58
End: 2023-01-20

## 2023-01-20 DIAGNOSIS — Z00.8 ENCOUNTER FOR OTHER GENERAL EXAMINATION: ICD-10-CM

## 2023-01-20 PROCEDURE — 78815 PET IMAGE W/CT SKULL-THIGH: CPT | Mod: 26

## 2023-01-20 PROCEDURE — A9552: CPT

## 2023-01-20 PROCEDURE — 78451 HT MUSCLE IMAGE SPECT SING: CPT

## 2023-01-20 PROCEDURE — 78815 PET IMAGE W/CT SKULL-THIGH: CPT

## 2023-01-20 PROCEDURE — A9500: CPT

## 2023-01-20 PROCEDURE — 78451 HT MUSCLE IMAGE SPECT SING: CPT | Mod: 26

## 2023-01-24 ENCOUNTER — NON-APPOINTMENT (OUTPATIENT)
Age: 58
End: 2023-01-24

## 2023-01-27 ENCOUNTER — APPOINTMENT (OUTPATIENT)
Dept: RHEUMATOLOGY | Facility: HOSPITAL | Age: 58
End: 2023-01-27

## 2023-01-31 RX ORDER — EZETIMIBE 10 MG/1
10 TABLET ORAL
Qty: 90 | Refills: 0 | Status: ACTIVE | COMMUNITY
Start: 2021-11-18 | End: 1900-01-01

## 2023-02-06 ENCOUNTER — APPOINTMENT (OUTPATIENT)
Dept: ENDOCRINOLOGY | Facility: CLINIC | Age: 58
End: 2023-02-06
Payer: MEDICAID

## 2023-02-06 VITALS
HEIGHT: 72 IN | BODY MASS INDEX: 22.08 KG/M2 | DIASTOLIC BLOOD PRESSURE: 80 MMHG | SYSTOLIC BLOOD PRESSURE: 140 MMHG | HEART RATE: 103 BPM | WEIGHT: 163 LBS | OXYGEN SATURATION: 97 % | TEMPERATURE: 97.8 F

## 2023-02-06 LAB
GLUCOSE BLDC GLUCOMTR-MCNC: 162
HBA1C MFR BLD HPLC: 6

## 2023-02-06 PROCEDURE — 83036 HEMOGLOBIN GLYCOSYLATED A1C: CPT | Mod: QW

## 2023-02-06 PROCEDURE — 82962 GLUCOSE BLOOD TEST: CPT

## 2023-02-06 PROCEDURE — 99214 OFFICE O/P EST MOD 30 MIN: CPT | Mod: 25

## 2023-02-06 RX ORDER — ALOGLIPTIN 25 MG/1
25 TABLET, FILM COATED ORAL
Qty: 90 | Refills: 1 | Status: DISCONTINUED | COMMUNITY
Start: 2020-09-23 | End: 2023-02-06

## 2023-02-06 RX ORDER — LOSARTAN POTASSIUM 50 MG/1
50 TABLET, FILM COATED ORAL
Qty: 90 | Refills: 3 | Status: ACTIVE | COMMUNITY
Start: 2020-12-21 | End: 1900-01-01

## 2023-02-07 ENCOUNTER — TRANSCRIPTION ENCOUNTER (OUTPATIENT)
Age: 58
End: 2023-02-07

## 2023-02-07 LAB
25(OH)D3 SERPL-MCNC: 39.8 NG/ML
ALBUMIN SERPL ELPH-MCNC: 4.2 G/DL
ALP BLD-CCNC: 68 U/L
ALT SERPL-CCNC: 13 U/L
ANION GAP SERPL CALC-SCNC: 15 MMOL/L
AST SERPL-CCNC: 11 U/L
BILIRUB SERPL-MCNC: 0.2 MG/DL
BUN SERPL-MCNC: 23 MG/DL
CALCIUM SERPL-MCNC: 9.6 MG/DL
CHLORIDE SERPL-SCNC: 104 MMOL/L
CHOLEST SERPL-MCNC: 195 MG/DL
CO2 SERPL-SCNC: 18 MMOL/L
CREAT SERPL-MCNC: 1.02 MG/DL
CREAT SPEC-SCNC: 88 MG/DL
EGFR: 87 ML/MIN/1.73M2
ESTIMATED AVERAGE GLUCOSE: 143 MG/DL
FRUCTOSAMINE SERPL-MCNC: 285 UMOL/L
GLUCOSE SERPL-MCNC: 171 MG/DL
GLYCOMARK.: 3.2 UG/ML
HBA1C MFR BLD HPLC: 6.6 %
HDLC SERPL-MCNC: 67 MG/DL
LDLC SERPL DIRECT ASSAY-MCNC: 109 MG/DL
MAGNESIUM SERPL-MCNC: 1.9 MG/DL
MICROALBUMIN 24H UR DL<=1MG/L-MCNC: 3.1 MG/DL
MICROALBUMIN/CREAT 24H UR-RTO: 35 MG/G
POTASSIUM SERPL-SCNC: 3.7 MMOL/L
PROT SERPL-MCNC: 6.9 G/DL
SODIUM SERPL-SCNC: 138 MMOL/L
T3FREE SERPL-MCNC: 2.57 PG/ML
T4 FREE SERPL-MCNC: 1.7 NG/DL
TRIGL SERPL-MCNC: 96 MG/DL
TSH SERPL-ACNC: 0.96 UIU/ML

## 2023-02-10 ENCOUNTER — NON-APPOINTMENT (OUTPATIENT)
Age: 58
End: 2023-02-10

## 2023-02-10 ENCOUNTER — RESULT REVIEW (OUTPATIENT)
Age: 58
End: 2023-02-10

## 2023-02-11 NOTE — HISTORY OF PRESENT ILLNESS
[FreeTextEntry1] : Mr. OTERO is a 57 year old male who returns today in follow up with regard to a history of type 2 diabetes mellitus. The diabetes mellitus has been present for about 10 years There is no known history of retinopathy, nephropathy. He too denies any history of neuropathy. \par \par Current dm medications include Glipizide 5 mg Qd, Metformin 1000 mg BID, and Steglatro 15 mg.\par \par Patient has not been carrying out home glucose monitoring of late. \par Denies any hypoglycemic episodes.\par \par Patient denies any chest pain, sob, neurologic or ophthalmologic complaints. He too denies any new podiatric concerns. He is up to date with his ophthalmologic visit. No diabetic retinopathy noted. \par \par Weight has been stable at 160 lbs \par \par POCT A1C returned today 6.0% ; previously 6.0  % on 8/29/22\par POCT glucose returned today at 162 mg/dl\par \par \par He is on Atorvastatin 80 mg 2-3x per week . Too is on Amlodipine 5 mg,  asa 81mg, Losartan, vitamin D daily, fishoil, vitamin C, vitamin b12. Not taking Ezetimibe. \par \par Had mini stroke in past- continues to note left sided numbness. Following with neurology. \par \par FH: father MI around age 35-40\par \par PCP: Dr. Kavon Kennedy.\par \par

## 2023-02-28 ENCOUNTER — RESULT REVIEW (OUTPATIENT)
Age: 58
End: 2023-02-28

## 2023-02-28 ENCOUNTER — OUTPATIENT (OUTPATIENT)
Dept: OUTPATIENT SERVICES | Facility: HOSPITAL | Age: 58
LOS: 1 days | End: 2023-02-28
Payer: MEDICAID

## 2023-02-28 ENCOUNTER — APPOINTMENT (OUTPATIENT)
Dept: ULTRASOUND IMAGING | Facility: IMAGING CENTER | Age: 58
End: 2023-02-28
Payer: MEDICAID

## 2023-02-28 DIAGNOSIS — Z98.89 OTHER SPECIFIED POSTPROCEDURAL STATES: Chronic | ICD-10-CM

## 2023-02-28 DIAGNOSIS — E04.2 NONTOXIC MULTINODULAR GOITER: ICD-10-CM

## 2023-02-28 PROCEDURE — 88173 CYTOPATH EVAL FNA REPORT: CPT

## 2023-02-28 PROCEDURE — 10005 FNA BX W/US GDN 1ST LES: CPT

## 2023-02-28 PROCEDURE — 88173 CYTOPATH EVAL FNA REPORT: CPT | Mod: 26

## 2023-02-28 PROCEDURE — 10006 FNA BX W/US GDN EA ADDL: CPT

## 2023-02-28 PROCEDURE — 88172 CYTP DX EVAL FNA 1ST EA SITE: CPT

## 2023-03-08 ENCOUNTER — APPOINTMENT (OUTPATIENT)
Dept: ENDOCRINOLOGY | Facility: CLINIC | Age: 58
End: 2023-03-08
Payer: MEDICAID

## 2023-03-13 RX ORDER — ATORVASTATIN CALCIUM 80 MG/1
80 TABLET, FILM COATED ORAL
Qty: 90 | Refills: 3 | Status: ACTIVE | COMMUNITY
Start: 2020-12-23 | End: 1900-01-01

## 2023-03-21 ENCOUNTER — APPOINTMENT (OUTPATIENT)
Dept: ENDOCRINOLOGY | Facility: CLINIC | Age: 58
End: 2023-03-21
Payer: MEDICAID

## 2023-03-21 VITALS
HEIGHT: 72 IN | SYSTOLIC BLOOD PRESSURE: 140 MMHG | TEMPERATURE: 97 F | DIASTOLIC BLOOD PRESSURE: 70 MMHG | WEIGHT: 161.13 LBS | OXYGEN SATURATION: 97 % | HEART RATE: 121 BPM | BODY MASS INDEX: 21.83 KG/M2

## 2023-03-21 PROCEDURE — 99214 OFFICE O/P EST MOD 30 MIN: CPT

## 2023-03-21 PROCEDURE — 82962 GLUCOSE BLOOD TEST: CPT

## 2023-03-21 PROCEDURE — 83036 HEMOGLOBIN GLYCOSYLATED A1C: CPT | Mod: QW

## 2023-03-22 LAB
GLUCOSE BLDC GLUCOMTR-MCNC: 209
HBA1C MFR BLD HPLC: 6

## 2023-03-26 NOTE — HISTORY OF PRESENT ILLNESS
[FreeTextEntry1] : Mr. OTERO is a 57 year old  male who  returns today in follow up with regard to a history of Nodular thyroid along with  type 2 diabetes mellitus. \par \par Regarding the thyroid \par  hospital biopsy report dated 12/21/2022 reviewed on HIE revealed\par Lymph node supraclavicular left US guided core biopsy \par thyroid tissue with follicular architecture no lymph node elements seen \par \par this is favored to represent ectopic thyroid tissue or the thyroid gland itself with nodular hyperplasia. if this is truly a lymph node the possibilities would include a completely replaced lymph node as no lymph node tissue is seen, with a follicular thyroid neoplasm. \par \par Biopsy showed right lower pole nodule benign and left lower pole showing AUS \par \par ****He had repeat biopsy with Thyroseq which returned negative -no mutations detected\par \par \par The diabetes mellitus has been present for over 10 years  There is no known history of retinopathy, nephropathy. He  too denies any history of neuropathy. \par \par Current dm medications include Glipizide Er 5  mg QD, Metformin 1000 mg BID. and Steglatro 15 mg.\par \par Patient checks BG levels at home 2-3x per week  HGM of late has shown values to be running   \par \par Patient denies any chest pain, sob, neurologic or ophthalmologic complaints. He  too denies any new podiatric concerns. He  is up to date with his ophthalmologic visit  Over the last year-he had lost weight with adjustment in diet and juicing vegetables.\par \par a1c returned at 6.6% \par POCT glucose returned today at  209 ( he ate 45 minutes ago) \par \par He is on Atorvastatin 80 mg. Too is on Amlodipine 10 mg , Losartan 100 mg for last 1 month but prescription was sent as 50 mg  and  asa.\par Had mini stroke in past in 2012 \par \par On hospital admission from dec 2022, thyroid US was ordered showing   Heterogenous mass along the left lower pole measuring 3.4 x 2.6 x 3.4cm possibly exophytic thyroid nodule  and Hyperechoic nodule in the right lower pole measuring 2.4 x 1.4 x 1.9 TIRADS 3. \par \par

## 2023-04-03 ENCOUNTER — APPOINTMENT (OUTPATIENT)
Dept: ELECTROPHYSIOLOGY | Facility: CLINIC | Age: 58
End: 2023-04-03

## 2023-04-11 ENCOUNTER — APPOINTMENT (OUTPATIENT)
Dept: ELECTROPHYSIOLOGY | Facility: CLINIC | Age: 58
End: 2023-04-11
Payer: MEDICAID

## 2023-04-11 ENCOUNTER — NON-APPOINTMENT (OUTPATIENT)
Age: 58
End: 2023-04-11

## 2023-04-11 PROCEDURE — 93295 DEV INTERROG REMOTE 1/2/MLT: CPT

## 2023-04-11 PROCEDURE — 93296 REM INTERROG EVL PM/IDS: CPT

## 2023-04-12 ENCOUNTER — NON-APPOINTMENT (OUTPATIENT)
Age: 58
End: 2023-04-12

## 2023-04-14 NOTE — PROGRESS NOTE ADULT - PROBLEM SELECTOR PROBLEM 2
Second degree heart block Detail Level: Generalized Detail Level: Zone Detail Level: Detailed Detail Level: Simple

## 2023-05-04 ENCOUNTER — TRANSCRIPTION ENCOUNTER (OUTPATIENT)
Age: 58
End: 2023-05-04

## 2023-05-08 ENCOUNTER — NON-APPOINTMENT (OUTPATIENT)
Age: 58
End: 2023-05-08

## 2023-05-08 ENCOUNTER — APPOINTMENT (OUTPATIENT)
Dept: ELECTROPHYSIOLOGY | Facility: CLINIC | Age: 58
End: 2023-05-08
Payer: MEDICAID

## 2023-05-08 VITALS — HEART RATE: 105 BPM | SYSTOLIC BLOOD PRESSURE: 170 MMHG | DIASTOLIC BLOOD PRESSURE: 80 MMHG | OXYGEN SATURATION: 96 %

## 2023-05-08 PROCEDURE — 93283 PRGRMG EVAL IMPLANTABLE DFB: CPT

## 2023-05-08 PROCEDURE — 93000 ELECTROCARDIOGRAM COMPLETE: CPT | Mod: 59

## 2023-05-09 ENCOUNTER — NON-APPOINTMENT (OUTPATIENT)
Age: 58
End: 2023-05-09

## 2023-08-09 ENCOUNTER — NON-APPOINTMENT (OUTPATIENT)
Age: 58
End: 2023-08-09

## 2023-08-09 ENCOUNTER — APPOINTMENT (OUTPATIENT)
Dept: ELECTROPHYSIOLOGY | Facility: CLINIC | Age: 58
End: 2023-08-09
Payer: MEDICAID

## 2023-08-09 PROCEDURE — 93295 DEV INTERROG REMOTE 1/2/MLT: CPT

## 2023-08-09 PROCEDURE — 93296 REM INTERROG EVL PM/IDS: CPT

## 2023-08-30 ENCOUNTER — APPOINTMENT (OUTPATIENT)
Dept: ENDOCRINOLOGY | Facility: CLINIC | Age: 58
End: 2023-08-30
Payer: MEDICAID

## 2023-08-30 VITALS
TEMPERATURE: 97.4 F | HEIGHT: 72 IN | DIASTOLIC BLOOD PRESSURE: 86 MMHG | SYSTOLIC BLOOD PRESSURE: 152 MMHG | WEIGHT: 163.38 LBS | BODY MASS INDEX: 22.13 KG/M2 | OXYGEN SATURATION: 98 % | HEART RATE: 98 BPM

## 2023-08-30 DIAGNOSIS — E53.8 DEFICIENCY OF OTHER SPECIFIED B GROUP VITAMINS: ICD-10-CM

## 2023-08-30 DIAGNOSIS — R79.89 OTHER SPECIFIED ABNORMAL FINDINGS OF BLOOD CHEMISTRY: ICD-10-CM

## 2023-08-30 DIAGNOSIS — E04.2 NONTOXIC MULTINODULAR GOITER: ICD-10-CM

## 2023-08-30 DIAGNOSIS — I10 ESSENTIAL (PRIMARY) HYPERTENSION: ICD-10-CM

## 2023-08-30 DIAGNOSIS — E78.5 HYPERLIPIDEMIA, UNSPECIFIED: ICD-10-CM

## 2023-08-30 DIAGNOSIS — E11.9 TYPE 2 DIABETES MELLITUS W/OUT COMPLICATIONS: ICD-10-CM

## 2023-08-30 LAB
GLUCOSE BLDC GLUCOMTR-MCNC: 166
HBA1C MFR BLD HPLC: 6.6

## 2023-08-30 PROCEDURE — 83036 HEMOGLOBIN GLYCOSYLATED A1C: CPT | Mod: QW

## 2023-08-30 PROCEDURE — 99214 OFFICE O/P EST MOD 30 MIN: CPT | Mod: 25

## 2023-08-30 PROCEDURE — 82962 GLUCOSE BLOOD TEST: CPT

## 2023-08-30 RX ORDER — METFORMIN HYDROCHLORIDE 1000 MG/1
1000 TABLET, COATED ORAL
Qty: 180 | Refills: 3 | Status: ACTIVE | COMMUNITY
Start: 2020-04-02 | End: 1900-01-01

## 2023-08-30 RX ORDER — GLIPIZIDE 5 MG/1
5 TABLET, FILM COATED, EXTENDED RELEASE ORAL
Qty: 90 | Refills: 3 | Status: ACTIVE | COMMUNITY
Start: 2021-11-08 | End: 1900-01-01

## 2023-08-30 RX ORDER — EMPAGLIFLOZIN 25 MG/1
25 TABLET, FILM COATED ORAL DAILY
Qty: 90 | Refills: 1 | Status: DISCONTINUED | COMMUNITY
Start: 2023-05-04 | End: 2023-08-30

## 2023-08-30 RX ORDER — ERTUGLIFLOZIN 15 MG/1
15 TABLET, FILM COATED ORAL
Qty: 90 | Refills: 3 | Status: ACTIVE | COMMUNITY
Start: 2020-04-02 | End: 1900-01-01

## 2023-08-31 LAB
25(OH)D3 SERPL-MCNC: 39.6 NG/ML
ALBUMIN SERPL ELPH-MCNC: 4.5 G/DL
ALP BLD-CCNC: 60 U/L
ALT SERPL-CCNC: 20 U/L
ANION GAP SERPL CALC-SCNC: 14 MMOL/L
AST SERPL-CCNC: 16 U/L
BILIRUB SERPL-MCNC: 0.2 MG/DL
BUN SERPL-MCNC: 29 MG/DL
CALCIUM SERPL-MCNC: 9.6 MG/DL
CHLORIDE SERPL-SCNC: 103 MMOL/L
CHOLEST SERPL-MCNC: 339 MG/DL
CO2 SERPL-SCNC: 22 MMOL/L
CREAT SERPL-MCNC: 1.14 MG/DL
CREAT SPEC-SCNC: 85 MG/DL
EGFR: 75 ML/MIN/1.73M2
ESTIMATED AVERAGE GLUCOSE: 148 MG/DL
FRUCTOSAMINE SERPL-MCNC: 290 UMOL/L
GLUCOSE SERPL-MCNC: 159 MG/DL
GLYCOMARK.: 3.2 UG/ML
HBA1C MFR BLD HPLC: 6.8 %
HDLC SERPL-MCNC: 78 MG/DL
LDLC SERPL DIRECT ASSAY-MCNC: 238 MG/DL
MAGNESIUM SERPL-MCNC: 2 MG/DL
MICROALBUMIN 24H UR DL<=1MG/L-MCNC: 6.3 MG/DL
MICROALBUMIN/CREAT 24H UR-RTO: 74 MG/G
POTASSIUM SERPL-SCNC: 4 MMOL/L
PROT SERPL-MCNC: 7 G/DL
SODIUM SERPL-SCNC: 139 MMOL/L
T3FREE SERPL-MCNC: 2.25 PG/ML
T4 FREE SERPL-MCNC: 1.7 NG/DL
TRIGL SERPL-MCNC: 116 MG/DL
TSH SERPL-ACNC: 1.11 UIU/ML

## 2023-09-04 NOTE — HISTORY OF PRESENT ILLNESS
[FreeTextEntry1] : Mr. OTERO is a 58 year old male who returns today in follow up with regard to a history of Nodular thyroid along with type 2 diabetes mellitus.  Regarding the thyroid: Upon hospital admission from dec 2022, thyroid US was ordered showing Heterogenous mass along the left lower pole measuring 3.4 x 2.6 x 3.4cm possibly exophytic thyroid nodule and Hyperechoic nodule in the right lower pole measuring 2.4 x 1.4 x 1.9 TIRADS 3.  hospital biopsy report dated 12/21/2022 reviewed on HIE revealed Lymph node supraclavicular left US guided core biopsy:  thyroid tissue with follicular architecture no lymph node elements seen. this is favored to represent ectopic thyroid tissue or the thyroid gland itself with nodular hyperplasia. if this is truly a lymph node the possibilities would include a completely replaced lymph node as no lymph node tissue is seen, with a follicular thyroid neoplasm.  Biopsy showed right lower pole nodule benign and left lower pole showing AUS ****He had repeat biopsy with Thyroseq which returned negative -no mutations detected   Regarding the DM, the diabetes mellitus has been present for over 10 years There is no known history of retinopathy, nephropathy. With regard to neuropathy, he reports numbness on the left side of his body since a stroke in 2012.   Current dm medications include Glipizide Er 5 mg QD, Metformin 1000 mg BID, and Jardiance 25 mg. Off Steglatro.   HGM of late has shown values to be running 160 a few hours after lunch.   He denies polyuria, polydipsia, or any visual changes. He too denies any skin lesions, skin breakdown or non-healing areas of skin. He too denies any podiatric concerns. Ophthalmologic evaluation is up to date.  POCT A1C returned today 6.6%; previously 6.0% on 03/21/23.  POCT glucose returned today at 166 mg/dl Had lunch about 2.5 hours ago; 4 eggs with cheese.    Additional Medical History: Had mini stroke in past in 2012.  Additional Medications: He is on Atorvastatin 80 mg. Too is on Amlodipine 10 mg, Losartan 100 mg and ASA.

## 2023-09-25 ENCOUNTER — INPATIENT (INPATIENT)
Facility: HOSPITAL | Age: 58
LOS: 17 days | Discharge: ANOTHER HOSPITAL | End: 2023-10-13
Attending: INTERNAL MEDICINE | Admitting: INTERNAL MEDICINE
Payer: MEDICAID

## 2023-09-25 VITALS
RESPIRATION RATE: 18 BRPM | DIASTOLIC BLOOD PRESSURE: 73 MMHG | HEART RATE: 125 BPM | SYSTOLIC BLOOD PRESSURE: 108 MMHG | HEIGHT: 73 IN | TEMPERATURE: 98 F | WEIGHT: 164.91 LBS | OXYGEN SATURATION: 95 %

## 2023-09-25 DIAGNOSIS — Z98.89 OTHER SPECIFIED POSTPROCEDURAL STATES: Chronic | ICD-10-CM

## 2023-09-25 PROCEDURE — 93010 ELECTROCARDIOGRAM REPORT: CPT

## 2023-09-25 PROCEDURE — 99285 EMERGENCY DEPT VISIT HI MDM: CPT | Mod: 25

## 2023-09-25 RX ORDER — SODIUM CHLORIDE 9 MG/ML
1000 INJECTION INTRAMUSCULAR; INTRAVENOUS; SUBCUTANEOUS ONCE
Refills: 0 | Status: COMPLETED | OUTPATIENT
Start: 2023-09-25 | End: 2023-09-25

## 2023-09-25 RX ORDER — FAMOTIDINE 10 MG/ML
20 INJECTION INTRAVENOUS ONCE
Refills: 0 | Status: COMPLETED | OUTPATIENT
Start: 2023-09-25 | End: 2023-09-25

## 2023-09-25 NOTE — ED ADULT TRIAGE NOTE - CHIEF COMPLAINT QUOTE
Patient c/o intermittent heartburn and b/l arm tingling x 2 days. denies chest pain. Had burping this morning, went away. Pt has defibrillator.  pmhx: DM, HTN, HLD

## 2023-09-26 LAB
ALBUMIN SERPL ELPH-MCNC: 2.7 G/DL — LOW (ref 3.3–5)
ALBUMIN SERPL ELPH-MCNC: 2.8 G/DL — LOW (ref 3.3–5)
ALBUMIN SERPL ELPH-MCNC: 3.1 G/DL — LOW (ref 3.3–5)
ALP SERPL-CCNC: 59 U/L — SIGNIFICANT CHANGE UP (ref 40–120)
ALP SERPL-CCNC: 73 U/L — SIGNIFICANT CHANGE UP (ref 40–120)
ALP SERPL-CCNC: 78 U/L — SIGNIFICANT CHANGE UP (ref 40–120)
ALT FLD-CCNC: 33 U/L — SIGNIFICANT CHANGE UP (ref 12–78)
ALT FLD-CCNC: 35 U/L — SIGNIFICANT CHANGE UP (ref 12–78)
ALT FLD-CCNC: 42 U/L — SIGNIFICANT CHANGE UP (ref 12–78)
ANION GAP SERPL CALC-SCNC: 13 MMOL/L — SIGNIFICANT CHANGE UP (ref 5–17)
ANION GAP SERPL CALC-SCNC: 13 MMOL/L — SIGNIFICANT CHANGE UP (ref 5–17)
ANION GAP SERPL CALC-SCNC: 16 MMOL/L — SIGNIFICANT CHANGE UP (ref 5–17)
ANION GAP SERPL CALC-SCNC: 8 MMOL/L — SIGNIFICANT CHANGE UP (ref 5–17)
APPEARANCE UR: CLEAR — SIGNIFICANT CHANGE UP
APTT BLD: 31.9 SEC — SIGNIFICANT CHANGE UP (ref 24.5–35.6)
APTT BLD: 76.8 SEC — HIGH (ref 24.5–35.6)
APTT BLD: 99.8 SEC — HIGH (ref 24.5–35.6)
AST SERPL-CCNC: 113 U/L — HIGH (ref 15–37)
AST SERPL-CCNC: 114 U/L — HIGH (ref 15–37)
AST SERPL-CCNC: 99 U/L — HIGH (ref 15–37)
B-OH-BUTYR SERPL-SCNC: 1.8 MMOL/L — HIGH
BACTERIA # UR AUTO: ABNORMAL
BASE EXCESS BLDA CALC-SCNC: -15.9 MMOL/L — LOW (ref -2–3)
BASE EXCESS BLDA CALC-SCNC: 0.9 MMOL/L — SIGNIFICANT CHANGE UP (ref -2–3)
BASOPHILS # BLD AUTO: 0.02 K/UL — SIGNIFICANT CHANGE UP (ref 0–0.2)
BASOPHILS # BLD AUTO: 0.02 K/UL — SIGNIFICANT CHANGE UP (ref 0–0.2)
BASOPHILS NFR BLD AUTO: 0.1 % — SIGNIFICANT CHANGE UP (ref 0–2)
BASOPHILS NFR BLD AUTO: 0.2 % — SIGNIFICANT CHANGE UP (ref 0–2)
BILIRUB SERPL-MCNC: 0.4 MG/DL — SIGNIFICANT CHANGE UP (ref 0.2–1.2)
BILIRUB SERPL-MCNC: 0.5 MG/DL — SIGNIFICANT CHANGE UP (ref 0.2–1.2)
BILIRUB SERPL-MCNC: 0.6 MG/DL — SIGNIFICANT CHANGE UP (ref 0.2–1.2)
BILIRUB UR-MCNC: NEGATIVE — SIGNIFICANT CHANGE UP
BLOOD GAS COMMENTS ARTERIAL: SIGNIFICANT CHANGE UP
BLOOD GAS COMMENTS ARTERIAL: SIGNIFICANT CHANGE UP
BUN SERPL-MCNC: 20 MG/DL — SIGNIFICANT CHANGE UP (ref 7–23)
BUN SERPL-MCNC: 24 MG/DL — HIGH (ref 7–23)
CALCIUM SERPL-MCNC: 8.3 MG/DL — LOW (ref 8.5–10.1)
CALCIUM SERPL-MCNC: 8.3 MG/DL — LOW (ref 8.5–10.1)
CALCIUM SERPL-MCNC: 8.4 MG/DL — LOW (ref 8.5–10.1)
CALCIUM SERPL-MCNC: 8.8 MG/DL — SIGNIFICANT CHANGE UP (ref 8.5–10.1)
CHLORIDE SERPL-SCNC: 107 MMOL/L — SIGNIFICANT CHANGE UP (ref 96–108)
CHLORIDE SERPL-SCNC: 109 MMOL/L — HIGH (ref 96–108)
CHLORIDE SERPL-SCNC: 110 MMOL/L — HIGH (ref 96–108)
CHLORIDE SERPL-SCNC: 111 MMOL/L — HIGH (ref 96–108)
CK MB BLD-MCNC: 10.5 % — HIGH (ref 0–3.5)
CK MB BLD-MCNC: 11.2 % — HIGH (ref 0–3.5)
CK MB BLD-MCNC: 11.8 % — HIGH (ref 0–3.5)
CK MB BLD-MCNC: 8.5 % — HIGH (ref 0–3.5)
CK MB CFR SERPL CALC: 35.8 NG/ML — HIGH (ref 0.5–3.6)
CK MB CFR SERPL CALC: 53.2 NG/ML — HIGH (ref 0.5–3.6)
CK MB CFR SERPL CALC: 58.5 NG/ML — HIGH (ref 0.5–3.6)
CK MB CFR SERPL CALC: 63.3 NG/ML — HIGH (ref 0.5–3.6)
CK SERPL-CCNC: 304 U/L — SIGNIFICANT CHANGE UP (ref 26–308)
CK SERPL-CCNC: 522 U/L — HIGH (ref 26–308)
CK SERPL-CCNC: 601 U/L — HIGH (ref 26–308)
CK SERPL-CCNC: 627 U/L — HIGH (ref 26–308)
CO2 BLDA-SCNC: 11 MMOL/L — LOW (ref 19–24)
CO2 BLDA-SCNC: 25 MMOL/L — HIGH (ref 19–24)
CO2 SERPL-SCNC: 14 MMOL/L — LOW (ref 22–31)
CO2 SERPL-SCNC: 17 MMOL/L — LOW (ref 22–31)
CO2 SERPL-SCNC: 18 MMOL/L — LOW (ref 22–31)
CO2 SERPL-SCNC: 23 MMOL/L — SIGNIFICANT CHANGE UP (ref 22–31)
COLOR SPEC: YELLOW — SIGNIFICANT CHANGE UP
CREAT SERPL-MCNC: 1.42 MG/DL — HIGH (ref 0.5–1.3)
CREAT SERPL-MCNC: 1.61 MG/DL — HIGH (ref 0.5–1.3)
CREAT SERPL-MCNC: 1.62 MG/DL — HIGH (ref 0.5–1.3)
CREAT SERPL-MCNC: 1.74 MG/DL — HIGH (ref 0.5–1.3)
CRP SERPL-MCNC: 95 MG/L — HIGH
DIFF PNL FLD: NEGATIVE — SIGNIFICANT CHANGE UP
EGFR: 45 ML/MIN/1.73M2 — LOW
EGFR: 50 ML/MIN/1.73M2 — LOW
EGFR: 50 ML/MIN/1.73M2 — LOW
EGFR: 58 ML/MIN/1.73M2 — LOW
EOSINOPHIL # BLD AUTO: 0 K/UL — SIGNIFICANT CHANGE UP (ref 0–0.5)
EOSINOPHIL # BLD AUTO: 0 K/UL — SIGNIFICANT CHANGE UP (ref 0–0.5)
EOSINOPHIL NFR BLD AUTO: 0 % — SIGNIFICANT CHANGE UP (ref 0–6)
EOSINOPHIL NFR BLD AUTO: 0 % — SIGNIFICANT CHANGE UP (ref 0–6)
EPI CELLS # UR: SIGNIFICANT CHANGE UP
ERYTHROCYTE [SEDIMENTATION RATE] IN BLOOD: 23 MM/HR — HIGH (ref 0–20)
GAS PNL BLDA: SIGNIFICANT CHANGE UP
GLUCOSE BLDC GLUCOMTR-MCNC: 188 MG/DL — HIGH (ref 70–99)
GLUCOSE BLDC GLUCOMTR-MCNC: 199 MG/DL — HIGH (ref 70–99)
GLUCOSE BLDC GLUCOMTR-MCNC: 255 MG/DL — HIGH (ref 70–99)
GLUCOSE BLDC GLUCOMTR-MCNC: 336 MG/DL — HIGH (ref 70–99)
GLUCOSE SERPL-MCNC: 212 MG/DL — HIGH (ref 70–99)
GLUCOSE SERPL-MCNC: 213 MG/DL — HIGH (ref 70–99)
GLUCOSE SERPL-MCNC: 291 MG/DL — HIGH (ref 70–99)
GLUCOSE SERPL-MCNC: 301 MG/DL — HIGH (ref 70–99)
GLUCOSE UR QL: 1000 MG/DL
HCO3 BLDA-SCNC: 11 MMOL/L — LOW (ref 21–28)
HCO3 BLDA-SCNC: 24 MMOL/L — SIGNIFICANT CHANGE UP (ref 21–28)
HCT VFR BLD CALC: 49 % — SIGNIFICANT CHANGE UP (ref 39–50)
HCT VFR BLD CALC: 54 % — HIGH (ref 39–50)
HCT VFR BLD CALC: 56.6 % — HIGH (ref 39–50)
HGB BLD-MCNC: 15.2 G/DL — SIGNIFICANT CHANGE UP (ref 13–17)
HGB BLD-MCNC: 17.1 G/DL — HIGH (ref 13–17)
HGB BLD-MCNC: 17.2 G/DL — HIGH (ref 13–17)
HOROWITZ INDEX BLDA+IHG-RTO: 100 — SIGNIFICANT CHANGE UP
HOROWITZ INDEX BLDA+IHG-RTO: 60 — SIGNIFICANT CHANGE UP
IMM GRANULOCYTES NFR BLD AUTO: 0.3 % — SIGNIFICANT CHANGE UP (ref 0–0.9)
IMM GRANULOCYTES NFR BLD AUTO: 0.5 % — SIGNIFICANT CHANGE UP (ref 0–0.9)
INR BLD: 0.96 RATIO — SIGNIFICANT CHANGE UP (ref 0.85–1.18)
KETONES UR-MCNC: ABNORMAL
LACTATE SERPL-SCNC: 2.2 MMOL/L — HIGH (ref 0.7–2)
LACTATE SERPL-SCNC: 2.9 MMOL/L — HIGH (ref 0.7–2)
LACTATE SERPL-SCNC: 3 MMOL/L — HIGH (ref 0.7–2)
LACTATE SERPL-SCNC: 3.8 MMOL/L — HIGH (ref 0.7–2)
LACTATE SERPL-SCNC: 5.4 MMOL/L — CRITICAL HIGH (ref 0.7–2)
LEGIONELLA AG UR QL: NEGATIVE — SIGNIFICANT CHANGE UP
LEUKOCYTE ESTERASE UR-ACNC: NEGATIVE — SIGNIFICANT CHANGE UP
LIDOCAIN IGE QN: 56 U/L — SIGNIFICANT CHANGE UP (ref 13–75)
LYMPHOCYTES # BLD AUTO: 0.95 K/UL — LOW (ref 1–3.3)
LYMPHOCYTES # BLD AUTO: 1.41 K/UL — SIGNIFICANT CHANGE UP (ref 1–3.3)
LYMPHOCYTES # BLD AUTO: 11.4 % — LOW (ref 13–44)
LYMPHOCYTES # BLD AUTO: 5.7 % — LOW (ref 13–44)
MAGNESIUM SERPL-MCNC: 2.1 MG/DL — SIGNIFICANT CHANGE UP (ref 1.6–2.6)
MAGNESIUM SERPL-MCNC: 2.3 MG/DL — SIGNIFICANT CHANGE UP (ref 1.6–2.6)
MAGNESIUM SERPL-MCNC: 2.3 MG/DL — SIGNIFICANT CHANGE UP (ref 1.6–2.6)
MAGNESIUM SERPL-MCNC: 2.4 MG/DL — SIGNIFICANT CHANGE UP (ref 1.6–2.6)
MCHC RBC-ENTMCNC: 24.9 PG — LOW (ref 27–34)
MCHC RBC-ENTMCNC: 25 PG — LOW (ref 27–34)
MCHC RBC-ENTMCNC: 25.4 PG — LOW (ref 27–34)
MCHC RBC-ENTMCNC: 30.2 G/DL — LOW (ref 32–36)
MCHC RBC-ENTMCNC: 31 G/DL — LOW (ref 32–36)
MCHC RBC-ENTMCNC: 31.9 G/DL — LOW (ref 32–36)
MCV RBC AUTO: 79.9 FL — LOW (ref 80–100)
MCV RBC AUTO: 80.7 FL — SIGNIFICANT CHANGE UP (ref 80–100)
MCV RBC AUTO: 82.4 FL — SIGNIFICANT CHANGE UP (ref 80–100)
MONOCYTES # BLD AUTO: 0.75 K/UL — SIGNIFICANT CHANGE UP (ref 0–0.9)
MONOCYTES # BLD AUTO: 0.98 K/UL — HIGH (ref 0–0.9)
MONOCYTES NFR BLD AUTO: 5.9 % — SIGNIFICANT CHANGE UP (ref 2–14)
MONOCYTES NFR BLD AUTO: 6.1 % — SIGNIFICANT CHANGE UP (ref 2–14)
MRSA PCR RESULT.: SIGNIFICANT CHANGE UP
NEUTROPHILS # BLD AUTO: 10.15 K/UL — HIGH (ref 1.8–7.4)
NEUTROPHILS # BLD AUTO: 14.67 K/UL — HIGH (ref 1.8–7.4)
NEUTROPHILS NFR BLD AUTO: 82 % — HIGH (ref 43–77)
NEUTROPHILS NFR BLD AUTO: 87.8 % — HIGH (ref 43–77)
NITRITE UR-MCNC: NEGATIVE — SIGNIFICANT CHANGE UP
NRBC # BLD: 0 /100 WBCS — SIGNIFICANT CHANGE UP (ref 0–0)
NT-PROBNP SERPL-SCNC: 9884 PG/ML — HIGH (ref 0–125)
PCO2 BLDA: 27 MMHG — LOW (ref 32–46)
PCO2 BLDA: 32 MMHG — SIGNIFICANT CHANGE UP (ref 32–46)
PH BLDA: 7.2 — LOW (ref 7.35–7.45)
PH BLDA: 7.48 — HIGH (ref 7.35–7.45)
PH UR: 5 — SIGNIFICANT CHANGE UP (ref 5–8)
PHOSPHATE SERPL-MCNC: 1.9 MG/DL — LOW (ref 2.5–4.5)
PHOSPHATE SERPL-MCNC: 3.8 MG/DL — SIGNIFICANT CHANGE UP (ref 2.5–4.5)
PHOSPHATE SERPL-MCNC: 4.7 MG/DL — HIGH (ref 2.5–4.5)
PLATELET # BLD AUTO: 304 K/UL — SIGNIFICANT CHANGE UP (ref 150–400)
PLATELET # BLD AUTO: 323 K/UL — SIGNIFICANT CHANGE UP (ref 150–400)
PLATELET # BLD AUTO: 340 K/UL — SIGNIFICANT CHANGE UP (ref 150–400)
PO2 BLDA: 93 MMHG — SIGNIFICANT CHANGE UP (ref 83–108)
PO2 BLDA: 93 MMHG — SIGNIFICANT CHANGE UP (ref 83–108)
POTASSIUM SERPL-MCNC: 3.5 MMOL/L — SIGNIFICANT CHANGE UP (ref 3.5–5.3)
POTASSIUM SERPL-MCNC: 4.9 MMOL/L — SIGNIFICANT CHANGE UP (ref 3.5–5.3)
POTASSIUM SERPL-MCNC: 5 MMOL/L — SIGNIFICANT CHANGE UP (ref 3.5–5.3)
POTASSIUM SERPL-MCNC: 5.3 MMOL/L — SIGNIFICANT CHANGE UP (ref 3.5–5.3)
POTASSIUM SERPL-SCNC: 3.5 MMOL/L — SIGNIFICANT CHANGE UP (ref 3.5–5.3)
POTASSIUM SERPL-SCNC: 4.9 MMOL/L — SIGNIFICANT CHANGE UP (ref 3.5–5.3)
POTASSIUM SERPL-SCNC: 5 MMOL/L — SIGNIFICANT CHANGE UP (ref 3.5–5.3)
POTASSIUM SERPL-SCNC: 5.3 MMOL/L — SIGNIFICANT CHANGE UP (ref 3.5–5.3)
PROCALCITONIN SERPL-MCNC: 0.53 NG/ML — HIGH (ref 0.02–0.1)
PROT SERPL-MCNC: 7.6 GM/DL — SIGNIFICANT CHANGE UP (ref 6–8.3)
PROT SERPL-MCNC: 7.6 GM/DL — SIGNIFICANT CHANGE UP (ref 6–8.3)
PROT SERPL-MCNC: 7.7 GM/DL — SIGNIFICANT CHANGE UP (ref 6–8.3)
PROT UR-MCNC: 30 MG/DL
PROTHROM AB SERPL-ACNC: 11.5 SEC — SIGNIFICANT CHANGE UP (ref 9.5–13)
RAPID RVP RESULT: DETECTED
RBC # BLD: 6.07 M/UL — HIGH (ref 4.2–5.8)
RBC # BLD: 6.76 M/UL — HIGH (ref 4.2–5.8)
RBC # BLD: 6.87 M/UL — HIGH (ref 4.2–5.8)
RBC # FLD: 14.6 % — HIGH (ref 10.3–14.5)
RBC # FLD: 15.6 % — HIGH (ref 10.3–14.5)
RBC # FLD: 16.2 % — HIGH (ref 10.3–14.5)
RBC CASTS # UR COMP ASSIST: SIGNIFICANT CHANGE UP /HPF (ref 0–4)
RV+EV RNA SPEC QL NAA+PROBE: DETECTED
S AUREUS DNA NOSE QL NAA+PROBE: SIGNIFICANT CHANGE UP
SAO2 % BLDA: 97.7 % — SIGNIFICANT CHANGE UP (ref 94–98)
SAO2 % BLDA: 98.8 % — HIGH (ref 94–98)
SARS-COV-2 RNA SPEC QL NAA+PROBE: SIGNIFICANT CHANGE UP
SODIUM SERPL-SCNC: 137 MMOL/L — SIGNIFICANT CHANGE UP (ref 135–145)
SODIUM SERPL-SCNC: 140 MMOL/L — SIGNIFICANT CHANGE UP (ref 135–145)
SODIUM SERPL-SCNC: 140 MMOL/L — SIGNIFICANT CHANGE UP (ref 135–145)
SODIUM SERPL-SCNC: 142 MMOL/L — SIGNIFICANT CHANGE UP (ref 135–145)
SP GR SPEC: 1.01 — SIGNIFICANT CHANGE UP (ref 1.01–1.02)
TROPONIN I, HIGH SENSITIVITY RESULT: 6517.4 NG/L — HIGH
TROPONIN I, HIGH SENSITIVITY RESULT: 6791.6 NG/L — HIGH
TROPONIN I, HIGH SENSITIVITY RESULT: 8374.4 NG/L — HIGH
TROPONIN I, HIGH SENSITIVITY RESULT: HIGH NG/L
TROPONIN I, HIGH SENSITIVITY RESULT: HIGH NG/L
UROBILINOGEN FLD QL: NEGATIVE MG/DL — SIGNIFICANT CHANGE UP
WBC # BLD: 12.37 K/UL — HIGH (ref 3.8–10.5)
WBC # BLD: 15.72 K/UL — HIGH (ref 3.8–10.5)
WBC # BLD: 16.71 K/UL — HIGH (ref 3.8–10.5)
WBC # FLD AUTO: 12.37 K/UL — HIGH (ref 3.8–10.5)
WBC # FLD AUTO: 15.72 K/UL — HIGH (ref 3.8–10.5)
WBC # FLD AUTO: 16.71 K/UL — HIGH (ref 3.8–10.5)
WBC UR QL: SIGNIFICANT CHANGE UP

## 2023-09-26 PROCEDURE — 71045 X-RAY EXAM CHEST 1 VIEW: CPT | Mod: 26

## 2023-09-26 PROCEDURE — 99291 CRITICAL CARE FIRST HOUR: CPT | Mod: 25

## 2023-09-26 PROCEDURE — 93306 TTE W/DOPPLER COMPLETE: CPT | Mod: 26

## 2023-09-26 PROCEDURE — 70450 CT HEAD/BRAIN W/O DYE: CPT | Mod: 26,MA

## 2023-09-26 PROCEDURE — 71275 CT ANGIOGRAPHY CHEST: CPT | Mod: 26,MA

## 2023-09-26 PROCEDURE — 72125 CT NECK SPINE W/O DYE: CPT | Mod: 26,MA

## 2023-09-26 PROCEDURE — 74177 CT ABD & PELVIS W/CONTRAST: CPT | Mod: 26,MA

## 2023-09-26 PROCEDURE — 99291 CRITICAL CARE FIRST HOUR: CPT

## 2023-09-26 PROCEDURE — 93010 ELECTROCARDIOGRAM REPORT: CPT | Mod: 76

## 2023-09-26 RX ORDER — HEPARIN SODIUM 5000 [USP'U]/ML
INJECTION INTRAVENOUS; SUBCUTANEOUS
Qty: 25000 | Refills: 0 | Status: DISCONTINUED | OUTPATIENT
Start: 2023-09-26 | End: 2023-09-27

## 2023-09-26 RX ORDER — PANTOPRAZOLE SODIUM 20 MG/1
40 TABLET, DELAYED RELEASE ORAL
Refills: 0 | Status: DISCONTINUED | OUTPATIENT
Start: 2023-09-26 | End: 2023-10-10

## 2023-09-26 RX ORDER — CEFTRIAXONE 500 MG/1
1000 INJECTION, POWDER, FOR SOLUTION INTRAMUSCULAR; INTRAVENOUS EVERY 24 HOURS
Refills: 0 | Status: DISCONTINUED | OUTPATIENT
Start: 2023-09-26 | End: 2023-09-29

## 2023-09-26 RX ORDER — ASPIRIN/CALCIUM CARB/MAGNESIUM 324 MG
81 TABLET ORAL DAILY
Refills: 0 | Status: DISCONTINUED | OUTPATIENT
Start: 2023-09-27 | End: 2023-10-13

## 2023-09-26 RX ORDER — INSULIN LISPRO 100/ML
VIAL (ML) SUBCUTANEOUS EVERY 4 HOURS
Refills: 0 | Status: DISCONTINUED | OUTPATIENT
Start: 2023-09-26 | End: 2023-09-27

## 2023-09-26 RX ORDER — POTASSIUM PHOSPHATE, MONOBASIC POTASSIUM PHOSPHATE, DIBASIC 236; 224 MG/ML; MG/ML
30 INJECTION, SOLUTION INTRAVENOUS ONCE
Refills: 0 | Status: COMPLETED | OUTPATIENT
Start: 2023-09-26 | End: 2023-09-27

## 2023-09-26 RX ORDER — SODIUM CHLORIDE 9 MG/ML
1000 INJECTION, SOLUTION INTRAVENOUS
Refills: 0 | Status: DISCONTINUED | OUTPATIENT
Start: 2023-09-26 | End: 2023-09-26

## 2023-09-26 RX ORDER — METOCLOPRAMIDE HCL 10 MG
10 TABLET ORAL ONCE
Refills: 0 | Status: COMPLETED | OUTPATIENT
Start: 2023-09-26 | End: 2023-09-26

## 2023-09-26 RX ORDER — FUROSEMIDE 40 MG
80 TABLET ORAL ONCE
Refills: 0 | Status: COMPLETED | OUTPATIENT
Start: 2023-09-26 | End: 2023-09-26

## 2023-09-26 RX ORDER — DEXTROSE 50 % IN WATER 50 %
12.5 SYRINGE (ML) INTRAVENOUS ONCE
Refills: 0 | Status: DISCONTINUED | OUTPATIENT
Start: 2023-09-26 | End: 2023-09-26

## 2023-09-26 RX ORDER — SODIUM CHLORIDE 9 MG/ML
1000 INJECTION INTRAMUSCULAR; INTRAVENOUS; SUBCUTANEOUS ONCE
Refills: 0 | Status: COMPLETED | OUTPATIENT
Start: 2023-09-26 | End: 2023-09-26

## 2023-09-26 RX ORDER — IOHEXOL 300 MG/ML
30 INJECTION, SOLUTION INTRAVENOUS ONCE
Refills: 0 | Status: COMPLETED | OUTPATIENT
Start: 2023-09-26 | End: 2023-09-26

## 2023-09-26 RX ORDER — CLOPIDOGREL BISULFATE 75 MG/1
75 TABLET, FILM COATED ORAL DAILY
Refills: 0 | Status: DISCONTINUED | OUTPATIENT
Start: 2023-09-27 | End: 2023-10-13

## 2023-09-26 RX ORDER — POTASSIUM CHLORIDE 20 MEQ
10 PACKET (EA) ORAL
Refills: 0 | Status: COMPLETED | OUTPATIENT
Start: 2023-09-26 | End: 2023-09-27

## 2023-09-26 RX ORDER — HEPARIN SODIUM 5000 [USP'U]/ML
3000 INJECTION INTRAVENOUS; SUBCUTANEOUS EVERY 6 HOURS
Refills: 0 | Status: DISCONTINUED | OUTPATIENT
Start: 2023-09-26 | End: 2023-09-26

## 2023-09-26 RX ORDER — HEPARIN SODIUM 5000 [USP'U]/ML
6000 INJECTION INTRAVENOUS; SUBCUTANEOUS EVERY 6 HOURS
Refills: 0 | Status: DISCONTINUED | OUTPATIENT
Start: 2023-09-26 | End: 2023-09-26

## 2023-09-26 RX ORDER — HYDRALAZINE HCL 50 MG
10 TABLET ORAL ONCE
Refills: 0 | Status: COMPLETED | OUTPATIENT
Start: 2023-09-26 | End: 2023-09-26

## 2023-09-26 RX ORDER — CHLORHEXIDINE GLUCONATE 213 G/1000ML
1 SOLUTION TOPICAL
Refills: 0 | Status: DISCONTINUED | OUTPATIENT
Start: 2023-09-26 | End: 2023-10-13

## 2023-09-26 RX ORDER — LABETALOL HCL 100 MG
10 TABLET ORAL EVERY 4 HOURS
Refills: 0 | Status: DISCONTINUED | OUTPATIENT
Start: 2023-09-26 | End: 2023-09-26

## 2023-09-26 RX ORDER — INFLUENZA VIRUS VACCINE 15; 15; 15; 15 UG/.5ML; UG/.5ML; UG/.5ML; UG/.5ML
0.5 SUSPENSION INTRAMUSCULAR ONCE
Refills: 0 | Status: DISCONTINUED | OUTPATIENT
Start: 2023-09-26 | End: 2023-10-13

## 2023-09-26 RX ORDER — DEXTROSE 50 % IN WATER 50 %
15 SYRINGE (ML) INTRAVENOUS ONCE
Refills: 0 | Status: DISCONTINUED | OUTPATIENT
Start: 2023-09-26 | End: 2023-09-26

## 2023-09-26 RX ORDER — ALBUTEROL 90 UG/1
2.5 AEROSOL, METERED ORAL EVERY 6 HOURS
Refills: 0 | Status: DISCONTINUED | OUTPATIENT
Start: 2023-09-26 | End: 2023-10-13

## 2023-09-26 RX ORDER — ASPIRIN/CALCIUM CARB/MAGNESIUM 324 MG
324 TABLET ORAL ONCE
Refills: 0 | Status: COMPLETED | OUTPATIENT
Start: 2023-09-26 | End: 2023-09-26

## 2023-09-26 RX ORDER — ATORVASTATIN CALCIUM 80 MG/1
80 TABLET, FILM COATED ORAL AT BEDTIME
Refills: 0 | Status: DISCONTINUED | OUTPATIENT
Start: 2023-09-26 | End: 2023-10-13

## 2023-09-26 RX ORDER — CLOPIDOGREL BISULFATE 75 MG/1
300 TABLET, FILM COATED ORAL ONCE
Refills: 0 | Status: COMPLETED | OUTPATIENT
Start: 2023-09-26 | End: 2023-09-26

## 2023-09-26 RX ORDER — GLUCAGON INJECTION, SOLUTION 0.5 MG/.1ML
1 INJECTION, SOLUTION SUBCUTANEOUS ONCE
Refills: 0 | Status: DISCONTINUED | OUTPATIENT
Start: 2023-09-26 | End: 2023-09-26

## 2023-09-26 RX ORDER — HYDRALAZINE HCL 50 MG
10 TABLET ORAL EVERY 6 HOURS
Refills: 0 | Status: DISCONTINUED | OUTPATIENT
Start: 2023-09-26 | End: 2023-09-30

## 2023-09-26 RX ORDER — HEPARIN SODIUM 5000 [USP'U]/ML
6000 INJECTION INTRAVENOUS; SUBCUTANEOUS ONCE
Refills: 0 | Status: COMPLETED | OUTPATIENT
Start: 2023-09-26 | End: 2023-09-26

## 2023-09-26 RX ORDER — POTASSIUM CHLORIDE 20 MEQ
40 PACKET (EA) ORAL ONCE
Refills: 0 | Status: COMPLETED | OUTPATIENT
Start: 2023-09-26 | End: 2023-09-26

## 2023-09-26 RX ORDER — AZITHROMYCIN 500 MG/1
TABLET, FILM COATED ORAL
Refills: 0 | Status: DISCONTINUED | OUTPATIENT
Start: 2023-09-26 | End: 2023-09-26

## 2023-09-26 RX ORDER — SIMETHICONE 80 MG/1
80 TABLET, CHEWABLE ORAL ONCE
Refills: 0 | Status: COMPLETED | OUTPATIENT
Start: 2023-09-25 | End: 2023-09-25

## 2023-09-26 RX ORDER — AZITHROMYCIN 500 MG/1
500 TABLET, FILM COATED ORAL ONCE
Refills: 0 | Status: COMPLETED | OUTPATIENT
Start: 2023-09-26 | End: 2023-09-26

## 2023-09-26 RX ORDER — DEXTROSE 50 % IN WATER 50 %
25 SYRINGE (ML) INTRAVENOUS ONCE
Refills: 0 | Status: DISCONTINUED | OUTPATIENT
Start: 2023-09-26 | End: 2023-09-26

## 2023-09-26 RX ORDER — INSULIN LISPRO 100/ML
VIAL (ML) SUBCUTANEOUS EVERY 6 HOURS
Refills: 0 | Status: DISCONTINUED | OUTPATIENT
Start: 2023-09-26 | End: 2023-09-26

## 2023-09-26 RX ORDER — BUMETANIDE 0.25 MG/ML
1 INJECTION INTRAMUSCULAR; INTRAVENOUS
Qty: 20 | Refills: 0 | Status: DISCONTINUED | OUTPATIENT
Start: 2023-09-26 | End: 2023-09-28

## 2023-09-26 RX ORDER — MIDAZOLAM HYDROCHLORIDE 1 MG/ML
1 INJECTION, SOLUTION INTRAMUSCULAR; INTRAVENOUS ONCE
Refills: 0 | Status: DISCONTINUED | OUTPATIENT
Start: 2023-09-26 | End: 2023-09-26

## 2023-09-26 RX ORDER — HEPARIN SODIUM 5000 [USP'U]/ML
5000 INJECTION INTRAVENOUS; SUBCUTANEOUS EVERY 8 HOURS
Refills: 0 | Status: DISCONTINUED | OUTPATIENT
Start: 2023-09-26 | End: 2023-09-26

## 2023-09-26 RX ADMIN — SODIUM CHLORIDE 1000 MILLILITER(S): 9 INJECTION INTRAMUSCULAR; INTRAVENOUS; SUBCUTANEOUS at 05:33

## 2023-09-26 RX ADMIN — FAMOTIDINE 20 MILLIGRAM(S): 10 INJECTION INTRAVENOUS at 00:29

## 2023-09-26 RX ADMIN — Medication 80 MILLIGRAM(S): at 06:44

## 2023-09-26 RX ADMIN — PANTOPRAZOLE SODIUM 40 MILLIGRAM(S): 20 TABLET, DELAYED RELEASE ORAL at 18:05

## 2023-09-26 RX ADMIN — Medication 8: at 10:43

## 2023-09-26 RX ADMIN — Medication 10 MILLIGRAM(S): at 11:32

## 2023-09-26 RX ADMIN — Medication 2: at 22:49

## 2023-09-26 RX ADMIN — HEPARIN SODIUM 1300 UNIT(S)/HR: 5000 INJECTION INTRAVENOUS; SUBCUTANEOUS at 07:59

## 2023-09-26 RX ADMIN — CHLORHEXIDINE GLUCONATE 1 APPLICATION(S): 213 SOLUTION TOPICAL at 09:06

## 2023-09-26 RX ADMIN — Medication 2: at 18:16

## 2023-09-26 RX ADMIN — CLOPIDOGREL BISULFATE 300 MILLIGRAM(S): 75 TABLET, FILM COATED ORAL at 14:35

## 2023-09-26 RX ADMIN — Medication 30 MILLILITER(S): at 00:29

## 2023-09-26 RX ADMIN — AZITHROMYCIN 255 MILLIGRAM(S): 500 TABLET, FILM COATED ORAL at 10:27

## 2023-09-26 RX ADMIN — Medication 10 MILLIGRAM(S): at 23:26

## 2023-09-26 RX ADMIN — Medication 100 MILLIEQUIVALENT(S): at 23:26

## 2023-09-26 RX ADMIN — IOHEXOL 30 MILLILITER(S): 300 INJECTION, SOLUTION INTRAVENOUS at 00:29

## 2023-09-26 RX ADMIN — Medication 324 MILLIGRAM(S): at 12:51

## 2023-09-26 RX ADMIN — CEFTRIAXONE 100 MILLIGRAM(S): 500 INJECTION, POWDER, FOR SOLUTION INTRAMUSCULAR; INTRAVENOUS at 06:45

## 2023-09-26 RX ADMIN — Medication 10 MILLIGRAM(S): at 18:04

## 2023-09-26 RX ADMIN — Medication 10 MILLIGRAM(S): at 02:03

## 2023-09-26 RX ADMIN — Medication 40 MILLIEQUIVALENT(S): at 21:55

## 2023-09-26 RX ADMIN — MIDAZOLAM HYDROCHLORIDE 1 MILLIGRAM(S): 1 INJECTION, SOLUTION INTRAMUSCULAR; INTRAVENOUS at 02:55

## 2023-09-26 RX ADMIN — HEPARIN SODIUM 1100 UNIT(S)/HR: 5000 INJECTION INTRAVENOUS; SUBCUTANEOUS at 14:36

## 2023-09-26 RX ADMIN — BUMETANIDE 10 MG/HR: 0.25 INJECTION INTRAMUSCULAR; INTRAVENOUS at 12:49

## 2023-09-26 RX ADMIN — HEPARIN SODIUM 1300 UNIT(S)/HR: 5000 INJECTION INTRAVENOUS; SUBCUTANEOUS at 09:04

## 2023-09-26 RX ADMIN — HEPARIN SODIUM 6000 UNIT(S): 5000 INJECTION INTRAVENOUS; SUBCUTANEOUS at 07:59

## 2023-09-26 RX ADMIN — SIMETHICONE 80 MILLIGRAM(S): 80 TABLET, CHEWABLE ORAL at 00:30

## 2023-09-26 RX ADMIN — ATORVASTATIN CALCIUM 80 MILLIGRAM(S): 80 TABLET, FILM COATED ORAL at 21:55

## 2023-09-26 RX ADMIN — BUMETANIDE 10 MG/HR: 0.25 INJECTION INTRAMUSCULAR; INTRAVENOUS at 21:34

## 2023-09-26 RX ADMIN — SODIUM CHLORIDE 1000 MILLILITER(S): 9 INJECTION INTRAMUSCULAR; INTRAVENOUS; SUBCUTANEOUS at 00:30

## 2023-09-26 RX ADMIN — Medication 100 MILLIEQUIVALENT(S): at 22:05

## 2023-09-26 RX ADMIN — HEPARIN SODIUM 1100 UNIT(S)/HR: 5000 INJECTION INTRAVENOUS; SUBCUTANEOUS at 18:59

## 2023-09-26 RX ADMIN — Medication 6: at 14:45

## 2023-09-26 NOTE — H&P ADULT - HISTORY OF PRESENT ILLNESS
57 yo m pmhx CVA with mild left sided deficits, HTN, DM2, vertigo, HLD, Mobitz II s/pp Medtronic dual chamber ICD (12/21/22) biba from home with complaints of sob/chest pain.  Per patient's wife cp began yesterday however persistent today and with associated worsening sob.  In ED patient tachypneic, tachycardic.  Labs significant for Trop 6791.6, Cr 1.42, bnp and lactate 2.9.  Patient given 1L NS 59 yo m pmhx CVA with mild left sided deficits, HTN, DM2, vertigo, HLD, Mobitz II s/pp Medtronic dual chamber ICD (12/21/22) biba from home with complaints of sob/chest pain.  Per patient's wife cp began yesterday however persistent today and with associated worsening sob.  In ED patient tachypneic, tachycardic.  Labs significant for Trop 6791.6, Cr 1.42, bnp and lactate 2.9.  Patient given 2L IVF, lactate worsening, rapidly worsening sob and hypoxia.  Patient seen at bedside, on NRB with spo2 88-92% tachypenic, appears uncomfortable.  Coarse b/l.  Bedside POCUS exam performed, diffuse b lines, ivf plump, difficultly visualizing chambers however overall function appears decreased based off his previous tte.  Patient ordered for Bipap and lasix 80mg.  Stat TTE ordered.  Admit to ICU.

## 2023-09-26 NOTE — PATIENT PROFILE ADULT - FALL HARM RISK - HARM RISK INTERVENTIONS

## 2023-09-26 NOTE — ED ADULT NURSE REASSESSMENT NOTE - NS ED NURSE REASSESS COMMENT FT1
Pt asleep in stretcher, connected to cardiac monitor, with wife at bedside. No acute distress noted, pt still on nonrebreather mask. MD Michael made aware that CTA results are not back yet. Safety maintained. Assessment ongoing.

## 2023-09-26 NOTE — H&P ADULT - ASSESSMENT
57 yo m pmhx CVA with mild left sided deficits, HTN, DM2, vertigo, HLD, Mobitz II s/pp Medtronic dual chamber ICD (12/21/22) admitted with     1. CHF exacerbation  2. Acute hypoxic respiraotry failure  3. Acute pulmonary edema  4. NSTEMI  5. LAURA  6. MA    NEURO: no active issues  CV: NSTEMI, trend troponin. stat tte, small pericardial effusion on bedside POCUS and ct chest, trend lactate  RESP: Bilevel, actively titrating settings for spo2 >92%, aggressive diuresis as tolerated.   RENAL: LAURA, avoid nephrotoxic meds, renally dose trend urine output, bun/cr and electrolytes.  replace lytes as needed. francis for strict I&Os   GI: NPO  ENDO: ISS for glycemic control   ID: cap coverage, cx pending  HEME: heparin gtt per ac protocol   DISPO: full code    Critical Care time: 60 mins assessing presenting problems of acute illness that poses high probability of life threatening deterioration or end organ damage/dysfunction.  Medical decision making including Initiating plan of care, reviewing data, reviewing radiology, discussing with multidisciplinary team, non inclusive of procedures, discussing goals of care with patient/family  59 yo m pmhx CVA with mild left sided deficits, HTN, DM2, vertigo, HLD, Mobitz II s/pp Medtronic dual chamber ICD (12/21/22) admitted with     1. CHF exacerbation  2. Acute hypoxic respiraotry failure  3. Acute pulmonary edema  4. NSTEMI  5. LAURA  6. MA    NEURO: no active issues  CV: NSTEMI, trend troponin. stat tte, small pericardial effusion on bedside POCUS and ct chest, trend lactate  RESP: Bilevel, actively titrating settings for spo2 >92%, aggressive diuresis as tolerated.   RENAL: LAURA, avoid nephrotoxic meds, renally dose trend urine output, bun/cr and electrolytes.  replace lytes as needed. francis for strict I&Os   GI: NPO  ENDO: ISS for glycemic control   ID: cap coverage, cx pending  HEME: heparin gtt per ac protocol   DISPO: full code    discussed with eicu attending Dr. Abdullahi.     Critical Care time: 60 mins assessing presenting problems of acute illness that poses high probability of life threatening deterioration or end organ damage/dysfunction.  Medical decision making including Initiating plan of care, reviewing data, reviewing radiology, discussing with multidisciplinary team, non inclusive of procedures, discussing goals of care with patient/family

## 2023-09-26 NOTE — ED PROVIDER NOTE - CARE PLAN
1 Principal Discharge DX:	Chest pain  Secondary Diagnosis:	Epigastric pain  Secondary Diagnosis:	Paresthesia   Principal Discharge DX:	Chest pain  Secondary Diagnosis:	Epigastric pain  Secondary Diagnosis:	Paresthesia  Secondary Diagnosis:	Elevated troponin  Secondary Diagnosis:	Multifocal pneumonia  Secondary Diagnosis:	Lactate blood increased  Secondary Diagnosis:	Hypoxia

## 2023-09-26 NOTE — PATIENT PROFILE ADULT - PATIENT'S PREFERRED PRONOUN
Him/He Acitretin Pregnancy And Lactation Text: This medication is Pregnancy Category X and should not be given to women who are pregnant or may become pregnant in the future. This medication is excreted in breast milk.

## 2023-09-26 NOTE — H&P ADULT - CRITICAL CARE ATTENDING COMMENT
58 year old male with aicd for mobitz type 2 heart block presents with sob and chest pain. Patient with elevated troponins. concern for pulm Edema. on bipap. will attempt to diurese.  patient with good uop but patient is acidotic. appears to be metabolic. maintaining bp, no evidence of ischemia or hypoperfusion.  cards cx. bumex. bipap. DAPT. tte. will treat as nstemi may require transfer for cath and/or mechnical cardiac support. discussed with wife and cardiologist at bedside.

## 2023-09-26 NOTE — ED PROVIDER NOTE - OBJECTIVE STATEMENT
59 yo M with epigastric chest discomfort, with associated chest tightness for 2 days.  + associated paresthesia of b/l UEs for 2 days.  Pain not relieved with antacids--pt. thought it was gerd.  Pt. went to urgent care today, was given antacids and sent home.  Pt. came to ER because he feels something more might be happening.  He had a cold with fever/sore throat 3 days ago, which has resolved.  No other complaints/inciting event.    ROS: negative for fever, cough, headache, shortness of breath, abd pain, nausea, vomiting, diarrhea, rash, paresthesia, and focal weakness--all other systems reviewed are negative.   PMH: CAD, CVA 2012 (no residual), bradycardia (Mobitz type II AV block) s/p ICD placed on 12/21, HLD, BPH, NIDDM, thyroid nodules, Meds: metformin, glipizide, amlodipine, alfuzosin, Trospium, Viagra, atorvastatin; SH: Denies smoking/drinking/drug use

## 2023-09-26 NOTE — CHART NOTE - NSCHARTNOTEFT_GEN_A_CORE
Called Cardiology attending Larisa HERNANDEZ. Confirmed to give DAPT with loading dose of aspirin and plavix x 1 now and then continue on maintaince dosage tomorrow 9/27. Patient is s/p 10 mg of hydralazine x 1 with improvement of tachycardia and increase in BP used for vasodilation, updated Cardiology. Plan for STAT TTE and repeat labs with reassessment. Currently resting on bipap breathing in the 20s. Will monitor closely with low threshold to transfer to tertiary facility. Called Cardiology attending Larisa HERNANDEZ. Confirmed to give DAPT with loading dose of aspirin and plavix x 1 now and then continue on maintaince dosage tomorrow 9/27. Patient is s/p 10 mg of hydralazine x 1 with improvement of tachycardia and increase in BP used for vasodilation, updated Cardiology will plan for standing dosages. Plan for STAT TTE and repeat labs with reassessment. Currently resting on bipap breathing in the 20s. Will monitor closely with low threshold to transfer to tertiary facility.

## 2023-09-26 NOTE — ED ADULT NURSE NOTE - OBJECTIVE STATEMENT
Pt presents to ED c/o intermittent epigastric pain that radiates down both arms x 2days. Pt endorses experiencing burping that went away after taking PO pepcid. Pt has a defibrillator in place. Hx of HTN reports being unable to take meds for the past 2 days because of the heartburn. Pt speaking in clear complete sentences, connected to NICE monitor.

## 2023-09-26 NOTE — ED PROVIDER NOTE - PHYSICAL EXAMINATION
Vitals: tachy at 125, otherwise WNL  Gen: AAOx3, NAD, sitting comfortably in stretcher  Head: ncat, perrla, eomi b/l  Neck: supple, no lymphadenopathy, no midline deviation  Heart: rrr, no m/r/g  Lungs: CTA b/l, no rales/ronchi/wheezes  Abd: soft, nontender, non-distended, no rebound or guarding  Ext: no clubbing/cyanosis/edema  Neuro: sensation and muscle strength intact b/l, no focal weakness or sensory loss

## 2023-09-26 NOTE — ED ADULT NURSE REASSESSMENT NOTE - NS ED NURSE REASSESS COMMENT FT1
Pt becoming restless removing clothes and non-rebreather mask from face. MD Michael made aware. Awaiting new orders. Plan of care reviewed with pt and wife. Assessment ongoing. Safety maintained.

## 2023-09-26 NOTE — PATIENT PROFILE ADULT - FUNCTIONAL ASSESSMENT - BASIC MOBILITY 5.
Initial musculoskeletal treatment recommendation:    1.  Stretch the calf muscles as instructed once an hour.  2.  Ice the injured area in the evening; 20 min on/off.  3.  Take antiinflammatory medication as directed.  4.  Massage the soft tissues around the injured area in the morning to loosen the tissue  5.  Wear supportive foot wear    If no improvement in symptoms within four to six weeks, return to clinic for reevaluation.      
4 = No assist / stand by assistance

## 2023-09-26 NOTE — ED PROVIDER NOTE - CLINICAL SUMMARY MEDICAL DECISION MAKING FREE TEXT BOX
57 yo M with chest pain, epigastric pain, paresthsia of UEs b/l, tachycardia, concerning for ACS, PE, pancreatitis/ulcers, doubt CVA, cervical discopathy, covid  -cbc, cmp, lactate, lipase, mag, bnp, rvp, trop, CT brain/cervical, CTA chest, CT ab/pel, CXR, ekg, iv, hydration bolus, maalox/pepcid for possible gerd, simethicone for "gas"  -f/u results, reeval

## 2023-09-26 NOTE — ED PROVIDER NOTE - PROGRESS NOTE DETAILS
called night rads requesting read on CTA  they will reach out on teams when read is complete callback received from rads, + multifocal pna on CTA chest, no PE  ICU consulted for eval for admission for further care, will consider heparinization for NSTEMI, pending ICU preference--(trop trending down)  pt/wife informed of results thus far and plan of care, pt. comfortable resting in stretcher on monitor pt. accepted to icu

## 2023-09-26 NOTE — ED ADULT NURSE REASSESSMENT NOTE - NS ED NURSE REASSESS COMMENT FT1
covering for primary RN Mele. pt is Aox3, resting in stretcher on cardiac monitor. pt is tachycardic to 117, tachypneic to 34, and desatting to 89% on 4L NC. Dr. Michael aware of pts vitals at this time and called to bedside. pt placed on nonrebreather and oxygen saturation improved to 94% at this time. Dr. Michael aware.

## 2023-09-26 NOTE — CHART NOTE - NSCHARTNOTEFT_GEN_A_CORE
bedside pocus showing occasional B lines focus around right mid lung field. Cardiac pocus showing poor lv function and focal wall motion abn near septum. trace pericardial effusion

## 2023-09-26 NOTE — H&P ADULT - NSHPPHYSICALEXAM_GEN_ALL_CORE
GENERAL: pleasant adult male, sitting in bed, moderate distress  HEENT: nc/at, nrb in place  CV: sinus tach  RESP: coarse b/l  ABD: soft, nontender, nondistended, +bs  : unremarkable  MSK: appropriate for age  EXT: no edema  SKIN: warm  NEURO: A&O x3 interactive

## 2023-09-26 NOTE — ED PROVIDER NOTE - INTERPRETATION
EKG performed in ED, sinus tach at 106, No acute ischemic changes, +LBBB morphology, unchanged from prior in ER EKG performed in ED, sinus tach at 106, No acute ischemic changes, +LBBB morphology, unchanged from prior in ER (negative sgarbossa)

## 2023-09-26 NOTE — ED ADULT NURSE NOTE - NSFALLHARMRISKINTERV_ED_ALL_ED

## 2023-09-26 NOTE — H&P ADULT - NSICDXPASTMEDICALHX_GEN_ALL_CORE_FT
PAST MEDICAL HISTORY:  CVA (cerebral vascular accident)     CVA (cerebrovascular accident)     DM (diabetes mellitus)     DM (diabetes mellitus)     HTN (hypertension)     HTN (hypertension)     HTN (hypertension)     HTN (hypertension)

## 2023-09-27 LAB
A1C WITH ESTIMATED AVERAGE GLUCOSE RESULT: 6.9 % — HIGH (ref 4–5.6)
ALBUMIN SERPL ELPH-MCNC: 2.6 G/DL — LOW (ref 3.3–5)
ALBUMIN SERPL ELPH-MCNC: 2.7 G/DL — LOW (ref 3.3–5)
ALP SERPL-CCNC: 64 U/L — SIGNIFICANT CHANGE UP (ref 40–120)
ALP SERPL-CCNC: 66 U/L — SIGNIFICANT CHANGE UP (ref 40–120)
ALT FLD-CCNC: 37 U/L — SIGNIFICANT CHANGE UP (ref 12–78)
ALT FLD-CCNC: 39 U/L — SIGNIFICANT CHANGE UP (ref 12–78)
ANION GAP SERPL CALC-SCNC: 10 MMOL/L — SIGNIFICANT CHANGE UP (ref 5–17)
ANION GAP SERPL CALC-SCNC: 10 MMOL/L — SIGNIFICANT CHANGE UP (ref 5–17)
ANION GAP SERPL CALC-SCNC: 11 MMOL/L — SIGNIFICANT CHANGE UP (ref 5–17)
APTT BLD: 52.2 SEC — HIGH (ref 24.5–35.6)
APTT BLD: 55.1 SEC — HIGH (ref 24.5–35.6)
APTT BLD: 59 SEC — HIGH (ref 24.5–35.6)
APTT BLD: 78.5 SEC — HIGH (ref 24.5–35.6)
AST SERPL-CCNC: 101 U/L — HIGH (ref 15–37)
AST SERPL-CCNC: 122 U/L — HIGH (ref 15–37)
BASOPHILS # BLD AUTO: 0.03 K/UL — SIGNIFICANT CHANGE UP (ref 0–0.2)
BASOPHILS NFR BLD AUTO: 0.2 % — SIGNIFICANT CHANGE UP (ref 0–2)
BILIRUB SERPL-MCNC: 0.5 MG/DL — SIGNIFICANT CHANGE UP (ref 0.2–1.2)
BILIRUB SERPL-MCNC: 0.6 MG/DL — SIGNIFICANT CHANGE UP (ref 0.2–1.2)
BUN SERPL-MCNC: 27 MG/DL — HIGH (ref 7–23)
BUN SERPL-MCNC: 30 MG/DL — HIGH (ref 7–23)
BUN SERPL-MCNC: 31 MG/DL — HIGH (ref 7–23)
CALCIUM SERPL-MCNC: 7.9 MG/DL — LOW (ref 8.5–10.1)
CALCIUM SERPL-MCNC: 7.9 MG/DL — LOW (ref 8.5–10.1)
CALCIUM SERPL-MCNC: 8.1 MG/DL — LOW (ref 8.5–10.1)
CHLORIDE SERPL-SCNC: 101 MMOL/L — SIGNIFICANT CHANGE UP (ref 96–108)
CHLORIDE SERPL-SCNC: 106 MMOL/L — SIGNIFICANT CHANGE UP (ref 96–108)
CHLORIDE SERPL-SCNC: 107 MMOL/L — SIGNIFICANT CHANGE UP (ref 96–108)
CHOLEST SERPL-MCNC: 198 MG/DL — SIGNIFICANT CHANGE UP
CK MB BLD-MCNC: 6.3 % — HIGH (ref 0–3.5)
CK MB CFR SERPL CALC: 32.5 NG/ML — HIGH (ref 0.5–3.6)
CK SERPL-CCNC: 518 U/L — HIGH (ref 26–308)
CO2 SERPL-SCNC: 22 MMOL/L — SIGNIFICANT CHANGE UP (ref 22–31)
CO2 SERPL-SCNC: 24 MMOL/L — SIGNIFICANT CHANGE UP (ref 22–31)
CO2 SERPL-SCNC: 26 MMOL/L — SIGNIFICANT CHANGE UP (ref 22–31)
CREAT SERPL-MCNC: 1.62 MG/DL — HIGH (ref 0.5–1.3)
CREAT SERPL-MCNC: 1.72 MG/DL — HIGH (ref 0.5–1.3)
CREAT SERPL-MCNC: 1.9 MG/DL — HIGH (ref 0.5–1.3)
CULTURE RESULTS: SIGNIFICANT CHANGE UP
EGFR: 41 ML/MIN/1.73M2 — LOW
EGFR: 46 ML/MIN/1.73M2 — LOW
EGFR: 50 ML/MIN/1.73M2 — LOW
EOSINOPHIL # BLD AUTO: 0 K/UL — SIGNIFICANT CHANGE UP (ref 0–0.5)
EOSINOPHIL NFR BLD AUTO: 0 % — SIGNIFICANT CHANGE UP (ref 0–6)
ESTIMATED AVERAGE GLUCOSE: 151 MG/DL — HIGH (ref 68–114)
GLUCOSE BLDC GLUCOMTR-MCNC: 181 MG/DL — HIGH (ref 70–99)
GLUCOSE BLDC GLUCOMTR-MCNC: 208 MG/DL — HIGH (ref 70–99)
GLUCOSE BLDC GLUCOMTR-MCNC: 211 MG/DL — HIGH (ref 70–99)
GLUCOSE BLDC GLUCOMTR-MCNC: 251 MG/DL — HIGH (ref 70–99)
GLUCOSE BLDC GLUCOMTR-MCNC: 319 MG/DL — HIGH (ref 70–99)
GLUCOSE SERPL-MCNC: 205 MG/DL — HIGH (ref 70–99)
GLUCOSE SERPL-MCNC: 211 MG/DL — HIGH (ref 70–99)
GLUCOSE SERPL-MCNC: 289 MG/DL — HIGH (ref 70–99)
HCT VFR BLD CALC: 47.6 % — SIGNIFICANT CHANGE UP (ref 39–50)
HCT VFR BLD CALC: 49.1 % — SIGNIFICANT CHANGE UP (ref 39–50)
HDLC SERPL-MCNC: 66 MG/DL — SIGNIFICANT CHANGE UP
HGB BLD-MCNC: 15.6 G/DL — SIGNIFICANT CHANGE UP (ref 13–17)
HGB BLD-MCNC: 15.8 G/DL — SIGNIFICANT CHANGE UP (ref 13–17)
IMM GRANULOCYTES NFR BLD AUTO: 0.3 % — SIGNIFICANT CHANGE UP (ref 0–0.9)
LIPID PNL WITH DIRECT LDL SERPL: 115 MG/DL — HIGH
LYMPHOCYTES # BLD AUTO: 0.77 K/UL — LOW (ref 1–3.3)
LYMPHOCYTES # BLD AUTO: 4.9 % — LOW (ref 13–44)
MAGNESIUM SERPL-MCNC: 2.1 MG/DL — SIGNIFICANT CHANGE UP (ref 1.6–2.6)
MAGNESIUM SERPL-MCNC: 2.3 MG/DL — SIGNIFICANT CHANGE UP (ref 1.6–2.6)
MAGNESIUM SERPL-MCNC: 2.4 MG/DL — SIGNIFICANT CHANGE UP (ref 1.6–2.6)
MCHC RBC-ENTMCNC: 25 PG — LOW (ref 27–34)
MCHC RBC-ENTMCNC: 25.5 PG — LOW (ref 27–34)
MCHC RBC-ENTMCNC: 31.8 G/DL — LOW (ref 32–36)
MCHC RBC-ENTMCNC: 33.2 G/DL — SIGNIFICANT CHANGE UP (ref 32–36)
MCV RBC AUTO: 76.9 FL — LOW (ref 80–100)
MCV RBC AUTO: 78.7 FL — LOW (ref 80–100)
MONOCYTES # BLD AUTO: 0.99 K/UL — HIGH (ref 0–0.9)
MONOCYTES NFR BLD AUTO: 6.4 % — SIGNIFICANT CHANGE UP (ref 2–14)
NEUTROPHILS # BLD AUTO: 13.73 K/UL — HIGH (ref 1.8–7.4)
NEUTROPHILS NFR BLD AUTO: 88.2 % — HIGH (ref 43–77)
NON HDL CHOLESTEROL: 131 MG/DL — HIGH
NRBC # BLD: 0 /100 WBCS — SIGNIFICANT CHANGE UP (ref 0–0)
NRBC # BLD: 0 /100 WBCS — SIGNIFICANT CHANGE UP (ref 0–0)
PHOSPHATE SERPL-MCNC: 2.3 MG/DL — LOW (ref 2.5–4.5)
PHOSPHATE SERPL-MCNC: 4 MG/DL — SIGNIFICANT CHANGE UP (ref 2.5–4.5)
PHOSPHATE SERPL-MCNC: 5 MG/DL — HIGH (ref 2.5–4.5)
PLATELET # BLD AUTO: 271 K/UL — SIGNIFICANT CHANGE UP (ref 150–400)
PLATELET # BLD AUTO: 317 K/UL — SIGNIFICANT CHANGE UP (ref 150–400)
POTASSIUM SERPL-MCNC: 3.6 MMOL/L — SIGNIFICANT CHANGE UP (ref 3.5–5.3)
POTASSIUM SERPL-MCNC: 3.8 MMOL/L — SIGNIFICANT CHANGE UP (ref 3.5–5.3)
POTASSIUM SERPL-MCNC: 4.2 MMOL/L — SIGNIFICANT CHANGE UP (ref 3.5–5.3)
POTASSIUM SERPL-SCNC: 3.6 MMOL/L — SIGNIFICANT CHANGE UP (ref 3.5–5.3)
POTASSIUM SERPL-SCNC: 3.8 MMOL/L — SIGNIFICANT CHANGE UP (ref 3.5–5.3)
POTASSIUM SERPL-SCNC: 4.2 MMOL/L — SIGNIFICANT CHANGE UP (ref 3.5–5.3)
PROT SERPL-MCNC: 7 GM/DL — SIGNIFICANT CHANGE UP (ref 6–8.3)
PROT SERPL-MCNC: 7.3 GM/DL — SIGNIFICANT CHANGE UP (ref 6–8.3)
RBC # BLD: 6.19 M/UL — HIGH (ref 4.2–5.8)
RBC # BLD: 6.24 M/UL — HIGH (ref 4.2–5.8)
RBC # FLD: 14.8 % — HIGH (ref 10.3–14.5)
RBC # FLD: 15.6 % — HIGH (ref 10.3–14.5)
SODIUM SERPL-SCNC: 138 MMOL/L — SIGNIFICANT CHANGE UP (ref 135–145)
SODIUM SERPL-SCNC: 139 MMOL/L — SIGNIFICANT CHANGE UP (ref 135–145)
SODIUM SERPL-SCNC: 140 MMOL/L — SIGNIFICANT CHANGE UP (ref 135–145)
SPECIMEN SOURCE: SIGNIFICANT CHANGE UP
TRIGL SERPL-MCNC: 90 MG/DL — SIGNIFICANT CHANGE UP
TROPONIN I, HIGH SENSITIVITY RESULT: HIGH NG/L
TSH SERPL-MCNC: 0.26 UIU/ML — LOW (ref 0.36–3.74)
WBC # BLD: 12.86 K/UL — HIGH (ref 3.8–10.5)
WBC # BLD: 15.57 K/UL — HIGH (ref 3.8–10.5)
WBC # FLD AUTO: 12.86 K/UL — HIGH (ref 3.8–10.5)
WBC # FLD AUTO: 15.57 K/UL — HIGH (ref 3.8–10.5)

## 2023-09-27 PROCEDURE — 99291 CRITICAL CARE FIRST HOUR: CPT

## 2023-09-27 PROCEDURE — 71045 X-RAY EXAM CHEST 1 VIEW: CPT | Mod: 26

## 2023-09-27 RX ORDER — KETOROLAC TROMETHAMINE 30 MG/ML
15 SYRINGE (ML) INJECTION ONCE
Refills: 0 | Status: DISCONTINUED | OUTPATIENT
Start: 2023-09-27 | End: 2023-09-27

## 2023-09-27 RX ORDER — INSULIN LISPRO 100/ML
10 VIAL (ML) SUBCUTANEOUS ONCE
Refills: 0 | Status: COMPLETED | OUTPATIENT
Start: 2023-09-27 | End: 2023-09-27

## 2023-09-27 RX ORDER — ACETAMINOPHEN 500 MG
1000 TABLET ORAL ONCE
Refills: 0 | Status: COMPLETED | OUTPATIENT
Start: 2023-09-27 | End: 2023-09-27

## 2023-09-27 RX ORDER — HEPARIN SODIUM 5000 [USP'U]/ML
INJECTION INTRAVENOUS; SUBCUTANEOUS
Qty: 25000 | Refills: 0 | Status: DISCONTINUED | OUTPATIENT
Start: 2023-09-27 | End: 2023-09-27

## 2023-09-27 RX ORDER — INSULIN LISPRO 100/ML
VIAL (ML) SUBCUTANEOUS
Refills: 0 | Status: DISCONTINUED | OUTPATIENT
Start: 2023-09-27 | End: 2023-09-28

## 2023-09-27 RX ORDER — INSULIN LISPRO 100/ML
VIAL (ML) SUBCUTANEOUS EVERY 4 HOURS
Refills: 0 | Status: DISCONTINUED | OUTPATIENT
Start: 2023-09-27 | End: 2023-09-27

## 2023-09-27 RX ORDER — HEPARIN SODIUM 5000 [USP'U]/ML
4400 INJECTION INTRAVENOUS; SUBCUTANEOUS EVERY 6 HOURS
Refills: 0 | Status: DISCONTINUED | OUTPATIENT
Start: 2023-09-27 | End: 2023-09-27

## 2023-09-27 RX ORDER — INSULIN LISPRO 100/ML
VIAL (ML) SUBCUTANEOUS EVERY 6 HOURS
Refills: 0 | Status: DISCONTINUED | OUTPATIENT
Start: 2023-09-27 | End: 2023-09-27

## 2023-09-27 RX ORDER — POTASSIUM CHLORIDE 20 MEQ
40 PACKET (EA) ORAL ONCE
Refills: 0 | Status: COMPLETED | OUTPATIENT
Start: 2023-09-27 | End: 2023-09-27

## 2023-09-27 RX ORDER — HEPARIN SODIUM 5000 [USP'U]/ML
1100 INJECTION INTRAVENOUS; SUBCUTANEOUS
Qty: 25000 | Refills: 0 | Status: DISCONTINUED | OUTPATIENT
Start: 2023-09-27 | End: 2023-09-29

## 2023-09-27 RX ORDER — POTASSIUM CHLORIDE 20 MEQ
10 PACKET (EA) ORAL
Refills: 0 | Status: COMPLETED | OUTPATIENT
Start: 2023-09-27 | End: 2023-09-27

## 2023-09-27 RX ADMIN — Medication 4: at 11:12

## 2023-09-27 RX ADMIN — Medication 100 MILLIEQUIVALENT(S): at 19:54

## 2023-09-27 RX ADMIN — Medication 400 MILLIGRAM(S): at 11:47

## 2023-09-27 RX ADMIN — Medication 6: at 21:28

## 2023-09-27 RX ADMIN — Medication 2: at 06:00

## 2023-09-27 RX ADMIN — PANTOPRAZOLE SODIUM 40 MILLIGRAM(S): 20 TABLET, DELAYED RELEASE ORAL at 17:26

## 2023-09-27 RX ADMIN — Medication 400 MILLIGRAM(S): at 06:37

## 2023-09-27 RX ADMIN — POTASSIUM PHOSPHATE, MONOBASIC POTASSIUM PHOSPHATE, DIBASIC 83.33 MILLIMOLE(S): 236; 224 INJECTION, SOLUTION INTRAVENOUS at 04:16

## 2023-09-27 RX ADMIN — BUMETANIDE 5 MG/HR: 0.25 INJECTION INTRAMUSCULAR; INTRAVENOUS at 08:37

## 2023-09-27 RX ADMIN — Medication 81 MILLIGRAM(S): at 11:12

## 2023-09-27 RX ADMIN — BUMETANIDE 5 MG/HR: 0.25 INJECTION INTRAMUSCULAR; INTRAVENOUS at 07:22

## 2023-09-27 RX ADMIN — HEPARIN SODIUM 900 UNIT(S)/HR: 5000 INJECTION INTRAVENOUS; SUBCUTANEOUS at 01:30

## 2023-09-27 RX ADMIN — Medication 1000 MILLIGRAM(S): at 21:15

## 2023-09-27 RX ADMIN — HEPARIN SODIUM 1100 UNIT(S)/HR: 5000 INJECTION INTRAVENOUS; SUBCUTANEOUS at 07:21

## 2023-09-27 RX ADMIN — Medication 15 MILLIGRAM(S): at 16:18

## 2023-09-27 RX ADMIN — Medication 100 MILLIEQUIVALENT(S): at 21:01

## 2023-09-27 RX ADMIN — Medication 40 MILLIEQUIVALENT(S): at 05:04

## 2023-09-27 RX ADMIN — Medication 10 UNIT(S): at 17:28

## 2023-09-27 RX ADMIN — HEPARIN SODIUM 1250 UNIT(S)/HR: 5000 INJECTION INTRAVENOUS; SUBCUTANEOUS at 09:55

## 2023-09-27 RX ADMIN — Medication 10 MILLIGRAM(S): at 05:04

## 2023-09-27 RX ADMIN — Medication 10 MILLIGRAM(S): at 11:10

## 2023-09-27 RX ADMIN — Medication 100 MILLIEQUIVALENT(S): at 00:45

## 2023-09-27 RX ADMIN — ATORVASTATIN CALCIUM 80 MILLIGRAM(S): 80 TABLET, FILM COATED ORAL at 21:15

## 2023-09-27 RX ADMIN — Medication 100 MILLIEQUIVALENT(S): at 21:54

## 2023-09-27 RX ADMIN — CLOPIDOGREL BISULFATE 75 MILLIGRAM(S): 75 TABLET, FILM COATED ORAL at 11:11

## 2023-09-27 RX ADMIN — Medication 4: at 01:49

## 2023-09-27 RX ADMIN — Medication 15 MILLIGRAM(S): at 17:30

## 2023-09-27 RX ADMIN — CEFTRIAXONE 100 MILLIGRAM(S): 500 INJECTION, POWDER, FOR SOLUTION INTRAMUSCULAR; INTRAVENOUS at 05:55

## 2023-09-27 RX ADMIN — PANTOPRAZOLE SODIUM 40 MILLIGRAM(S): 20 TABLET, DELAYED RELEASE ORAL at 05:04

## 2023-09-27 RX ADMIN — Medication 1000 MILLIGRAM(S): at 08:00

## 2023-09-27 RX ADMIN — HEPARIN SODIUM 1150 UNIT(S)/HR: 5000 INJECTION INTRAVENOUS; SUBCUTANEOUS at 19:00

## 2023-09-27 RX ADMIN — Medication 1000 MILLIGRAM(S): at 13:00

## 2023-09-27 RX ADMIN — HEPARIN SODIUM 1100 UNIT(S)/HR: 5000 INJECTION INTRAVENOUS; SUBCUTANEOUS at 02:13

## 2023-09-27 RX ADMIN — HEPARIN SODIUM 1150 UNIT(S)/HR: 5000 INJECTION INTRAVENOUS; SUBCUTANEOUS at 16:26

## 2023-09-27 RX ADMIN — Medication 1000 MILLIGRAM(S): at 23:59

## 2023-09-27 RX ADMIN — HEPARIN SODIUM 1150 UNIT(S)/HR: 5000 INJECTION INTRAVENOUS; SUBCUTANEOUS at 19:22

## 2023-09-27 NOTE — PROGRESS NOTE ADULT - ATTENDING COMMENTS
58 year old with shortness of breath. suspect fluid overload. diuresing well.  mildly tachy. cultures negative to date.  SOB improving. will attempt to wean bipap. cards following. trops peaked, will vandana cath eventually

## 2023-09-27 NOTE — PROGRESS NOTE ADULT - ASSESSMENT
Assessment:  58M w/ PMH of CVA, HTN, DM2, and Mobitz 2 s/p ICD, BIBEMS for SOB and chest pain. Patient placed on BiPAP in the ED and given 80mg lasix, troponin elevated, fluid overload concerning NSTEMI vs CHF exacerbation transferred to ICU for further work-up and monitoring. Patient started on heparin and bumex gtt, remains on BiPAP.    Issues:  NSTEMI  Multifocal PNA   Entero/rhinovirus  LAURA  DM2  AHRF   Assessment:  58M w/ PMH of CVA, HTN, DM2, and Mobitz 2 s/p ICD, BIBEMS for SOB and chest pain. Patient placed on BiPAP in the ED and given 80mg lasix, troponin elevated, fluid overload concerning NSTEMI vs CHF exacerbation transferred to ICU for further work-up and monitoring. Patient started on heparin and bumex gtt, remains on BiPAP.    Issues:  NSTEMI  Multifocal PNA   Entero/rhinovirus  LAURA  DM2  Pulmonary Edema  Sepsis    PLAN:    Neuro:  -A&O X 3, no acute issues    CV:  NSTEMI/CHF exacerbation:  -TTE 9/26 notable for severely decreased global LV systolic function was severely decreased LVEF 20%.  - patient persistently tachycardic, possibly due to infection, consider beta blocker in setting of NSTEMI/CHF  -Continue Bumex gtt for diuresis output 2.5L overnight  -Troponin remains elevated, now downtrending 47777 from [id number], will repeat with morning labs  Deb 2:  -s/p ICD 12/21          Assessment:  58M w/ PMH of CVA, HTN, DM2, and Mobitz 2 s/p ICD, BIBEMS for SOB and chest pain. Patient placed on BiPAP in the ED and given 80mg lasix, troponin elevated, fluid overload concerning NSTEMI vs CHF exacerbation transferred to ICU for further work-up and monitoring. Patient started on heparin and bumex gtt, remains on BiPAP.    Issues:  NSTEMI  CHF exacerbation  Multifocal PNA   Entero/rhinovirus  LAURA  DM2  Pulmonary Edema/fluid overload  Sepsis    PLAN:    Neuro:  -A&O X 3, no acute issues    CV:  NSTEMI/CHF exacerbation:  -TTE 9/26 notable for severely decreased global LV systolic function was severely decreased LVEF 20%.  - patient persistently tachycardic, possibly due to infection, consider beta blocker in setting of NSTEMI/CHF  -Continue Bumex gtt for diuresis output 2.5L overnight  -Troponin remains elevated, now downtrending [id number] from [id number], will repeat with morning labs  -Continue ASA 81mg daily, Clopidogrel 75mg daily, atorvastatin 80mg at bedtime.  HTN:  -continue hydralazine 10mg IVP q6  Mobitz 2:  -s/p ICD 12/21          Assessment:  58M w/ PMH of CVA, HTN, DM2, and Mobitz 2 s/p ICD, BIBEMS for SOB and chest pain. Patient placed on BiPAP in the ED and given 80mg lasix, troponin elevated, fluid overload concerning NSTEMI vs CHF exacerbation vs PE transferred to ICU for further work-up and monitoring. Patient started on heparin and bumex gtt, remains on BiPAP.    Issues:  NSTEMI  CHF exacerbation  Multifocal PNA   Entero/rhinovirus  LAURA  DM2  Pulmonary Edema/fluid overload  Sepsis    PLAN:    Neuro:  -A&O X 3, no acute issues    CV:  NSTEMI/CHF exacerbation:  -TTE 9/26 notable for severely decreased global LV systolic function was severely decreased LVEF 20%.  - patient persistently tachycardic, possibly due to infection, consider beta blocker in setting of NSTEMI/CHF  -Continue Bumex gtt for diuresis output 2.5L overnight, cardiology following/recs   -Cont. Heparin gtt, titrate as per nomogram  -Troponin remains elevated, now downtrending 18928 from [id number], will repeat with morning labs  -Continue ASA 81mg daily, Clopidogrel 75mg daily, atorvastatin 80mg at bedtime.  HTN:  -continue hydralazine 10mg IVP q6  Mobitz 2:  -s/p ICD 12/21     PULM:  -CTA negative for PE  -CTP notable for pulmonary edema and b/l diffuse ground-glass opacities, possible combination CHF/multifocal PNA  -Patient on BiPAP 16/12 60% with SpO2 goal 88-97%, will wean as tolerated.  -Albuterol PRN q6 for SOB/wheezing    RENAL:  -LAURA, creatinine now downtrending   -continue bumex gtt  -Strict I/Os  -trend electrolytes, replete as needed      GI:  -No acute issues  -Diet DASH/TLC consistant carb.  -Protonix 40mg IVP daily for GI PPX  -monitor BM       Assessment:  58M w/ PMH of CVA, HTN, DM2, and Mobitz 2 s/p ICD, BIBEMS for SOB and chest pain. Patient placed on BiPAP in the ED and given 80mg lasix, troponin elevated, fluid overload concerning NSTEMI vs CHF exacerbation vs PE transferred to ICU for further work-up and monitoring. Patient started on heparin and bumex gtt, remains on BiPAP.    Issues:  NSTEMI  CHF exacerbation  Multifocal PNA   Entero/rhinovirus  LAURA  DM2  Pulmonary Edema/fluid overload  Sepsis    PLAN:    Neuro:  -A&O X 3, no acute issues    CV:  NSTEMI/CHF exacerbation:  -TTE 9/26 notable for severely decreased global LV systolic function was severely decreased LVEF 20%, BNP 9000 on admission   - patient persistently tachycardic, possibly due to infection, most likely compensatory,  consider beta blocker/ARB once resolved  -Continue Bumex gtt for diuresis output 2.5L overnight, cardiology following/recs   -Cont. Heparin gtt, titrate as per nomogram  -Troponin remains elevated, now downtrending [id number] from [id number], will repeat with morning labs  -Continue ASA 81mg daily, Clopidogrel 75mg daily, atorvastatin 80mg at bedtime.  - will hold on cardiac cath due to sepsis  HTN:  -continue hydralazine 10mg IVP q6 to decrease afterload   Mobitz 2:  -s/p ICD 12/21     PULM:  -CTA negative for PE  -CTP notable for pulmonary edema and b/l diffuse ground-glass opacities, possible combination CHF/multifocal PNA  -Patient on BiPAP 16/12 60% with SpO2 goal 88-97%, will wean as tolerated.  -Albuterol PRN q6 for SOB/wheezing    RENAL:  -LAURA, creatinine now downtrending, continue to trend BUN/Cr.  -CMP Q6  -continue bumex gtt  -Strict I/Os  -trend electrolytes, replete as needed      GI:  -No acute issues  -Diet DASH/TLC consistant carb.  -Protonix 40mg IVP daily for GI PPX  -monitor BM    ID:  -RVP positive for entero/rhinovirus  -Blood cultures 9/26 no growth at 24hrs, will continue ceftriaxone for empiric therapy, patient remains febrile  -Legionella negative, azithromycin dc'd    ENDO:  -continue FS q6 with iss coverage  - TSH decreased will obtain t3, t4 with next set of labs  -metformin level ordered    Ethics: Full code       Assessment:  58M w/ PMH of CVA, HTN, DM2, and Mobitz 2 s/p ICD, BIBEMS for SOB and chest pain. Patient placed on BiPAP in the ED and given 80mg lasix, troponin elevated, fluid overload concerning NSTEMI vs CHF exacerbation. Transferred to ICU for further work-up and hemodynamic monitoring.     Dx: NSTEMI, CHF exacerbation w/ pulmonary edema, Multifocal PNA, Entero/rhinovirus, LAURA    PLAN:  Neuro:  -A&O X 3, no acute issues    CV:  NSTEMI/CHF exacerbation:  - Cardiology following, appreciate recs  -TTE 9/26 notable for severely decreased global LV systolic function was severely decreased LVEF 20%, BNP 9000 on admission   - patient persistently tachycardic, possibly due to infection, most likely compensatory,  consider beta blocker/ARB once resolved  -Continue Bumex gtt for diuresis output 2.5L overnight, maintain net negative.   -Cont. Heparin gtt, titrate as per nomogram  -Troponin remains elevated, now downtrending [id number] from [id number], will repeat with morning labs  -Continue ASA 81mg daily, Clopidogrel 75mg daily, atorvastatin 80mg at bedtime. s/p load 9/26  - will hold on cardiac cath due to febrile state and oxygen requirements but will need eventually be transferred once stabilized   HTN:  -continue hydralazine 10mg IVP q6 to decrease afterload and vasodilating per Cards  Mobitz 2:  -s/p ICD 12/21     PULM:  -CTA negative for PE  -CTP notable for pulmonary edema and b/l diffuse ground-glass opacities, possible combination CHF/multifocal PNA  -Patient on BiPAP 16/12 60% with SpO2 goal 88-97%, will wean as tolerated.  - s/p aggressive diuresis with 80 lasix yest and now bumex gtt   - Albuterol PRN q6 for SOB/wheezing    RENAL:  -LAURA, creatinine now downtrending, continue to trend BUN/Cr.  -CMP Q6  -continue bumex gtt, s/p lasix- maintain net negative goal   -Douglass Cath   -Strict I/Os  -trend electrolytes, replete as needed    GI:  -No acute issues  -Diet DASH/TLC consistant carb.  -Protonix 40mg IVP daily for GI PPX  -monitor BM    ID:  -RVP positive for entero/rhinovirus  -Blood cultures 9/26 no growth at 24hrs, will continue ceftriaxone for empiric therapy, patient remains febrile  - f/u culture data   -Legionella negative, azithromycin dc'd    ENDO:  - continue FS q6 with iss coverage  - TSH decreased will obtain t3, t4 with next set of labs f/u   - f/u metformin level ordered    HEME:   - Heparin gtt, plavix and aspirin.     DISPO:   - Full code, wife updated on plan       *Discussed with ICU attending MD Pozo.

## 2023-09-27 NOTE — PROGRESS NOTE ADULT - SUBJECTIVE AND OBJECTIVE BOX
Cardiology Progress Note    Interval Events:  Diuresing well  febrile with associated increase in HR and now with lower BP  unable to wean off bilevel  remains without chest pain  trops peaked      MEDICATIONS:  aspirin enteric coated 81 milliGRAM(s) Oral daily  buMETAnide Infusion 1 mG/Hr IV Continuous <Continuous>  clopidogrel Tablet 75 milliGRAM(s) Oral daily  heparin  Infusion. 1100 Unit(s)/Hr IV Continuous <Continuous>  hydrALAZINE Injectable 10 milliGRAM(s) IV Push every 6 hours  cefTRIAXone   IVPB 1000 milliGRAM(s) IV Intermittent every 24 hours  albuterol    0.083% 2.5 milliGRAM(s) Nebulizer every 6 hours PRN  pantoprazole  Injectable 40 milliGRAM(s) IV Push two times a day  atorvastatin 80 milliGRAM(s) Oral at bedtime  insulin lispro (ADMELOG) corrective regimen sliding scale   SubCutaneous every 6 hours  chlorhexidine 2% Cloths 1 Application(s) Topical <User Schedule>  influenza   Vaccine 0.5 milliLiter(s) IntraMuscular once    PHYSICAL EXAM:  T(C): 38.3 (09-27-23 @ 13:00), Max: 39.1 (09-27-23 @ 06:00)  HR: 113 (09-27-23 @ 13:00) (105 - 133)  BP: 100/67 (09-27-23 @ 13:00) (100/67 - 150/98)  RR: 14 (09-27-23 @ 13:00) (0 - 34)  SpO2: 99% (09-27-23 @ 13:00) (90% - 100%)  Wt(kg): --  I&O's Summary    26 Sep 2023 07:01  -  27 Sep 2023 07:00  --------------------------------------------------------  IN: 1373 mL / OUT: 5012 mL / NET: -3639 mL    27 Sep 2023 07:01  -  27 Sep 2023 13:18  --------------------------------------------------------  IN: 260.5 mL / OUT: 910 mL / NET: -649.5 mL    Appearance: nad  HEENT:   bilevel present  Cardiovascular: Normal S1 S2, +elevated JVP, no murmurs, trace edema  Respiratory: mild diffuse crackles/rhonchi, fair air movement  Psychiatry:Mood & affect appropriate  Gastrointestinal:  soft nt  Skin: No rashes, no ecchymoses, no cyanosis	  Vascular: Peripheral pulses palpable bilaterally    LABS:	 	  CBC Full  -  ( 27 Sep 2023 08:30 )  WBC Count : 12.86 K/uL  Hemoglobin : 15.6 g/dL  Hematocrit : 49.1 %  Platelet Count - Automated : 271 K/uL    09-27    140  |  106  |  30<H>  ----------------------------<  205<H>  4.2   |  24  |  1.72<H>  09-27    139  |  107  |  27<H>  ----------------------------<  211<H>  3.6   |  22  |  1.62<H>    Ca    7.9<L>      27 Sep 2023 09:25  Ca    7.9<L>      27 Sep 2023 02:40  Phos  5.0     09-27  Phos  2.3     09-27  Mg     2.4     09-27  Mg     2.1     09-27    TPro  7.0  /  Alb  2.6<L>  /  TBili  0.5  /  DBili  x   /  AST  101<H>  /  ALT  39  /  AlkPhos  64  09-27  TPro  7.3  /  Alb  2.7<L>  /  TBili  0.6  /  DBili  x   /  AST  122<H>  /  ALT  37  /  AlkPhos  66  09-27      proBNP:   Lipid Profile:   HgA1c:   TSH: Thyroid Stimulating Hormone, Serum: 0.258 uIU/mL (09-27 @ 02:40)      CARDIAC MARKERS:  Troponin I, High Sensitivity Result: 72960.1 ng/L (09-27-23 @ 02:40)  Troponin I, High Sensitivity Result: 94092.0 ng/L (09-26-23 @ 20:27)  Troponin I, High Sensitivity Result: 99625.8 ng/L (09-26-23 @ 13:15)  Troponin I, High Sensitivity Result: 8374.4 ng/L (09-26-23 @ 07:55)  Troponin I, High Sensitivity Result: 6517.4 ng/L (09-26-23 @ 01:30)    ECG:  	SR, 1AVB, LBBB  RADIOLOGY:  OTHER: 	    PREVIOUS DIAGNOSTIC TESTING:    [x] Echocardiogram: < from: TTE Echo Complete w/o Contrast w/ Doppler (09.26.23 @ 13:20) >  Summary:   1. Technically limited study.   2. Left ventricular ejection fraction, by visual estimation, is 20%.   3. Severely decreased global left ventricular systolic function.   4. Multiple left ventricular regional wall motion abnormalities exist.   See wall motion findings.   5. Elevated left ventricular end-diastolic pressure.   6. Spectral Doppler shows pseudonormal pattern of left ventricular   myocardial filling (Grade II diastolic dysfunction).   7. Global longitudinal strain using GenAudio software at a HR of 121bpm and BP   137/99 is -5.1%, which is severely reduced. There is some sparing of the   basal segments.   8. Moderately reduced RV systolic function.   9. The left atrium is normal in size.  10. Trivial pericardial effusion.  11. Mild tricuspid regurgitation.    < end of copied text >    [ ] Catheterization:  [ ] Stress Test:

## 2023-09-27 NOTE — PROGRESS NOTE ADULT - ASSESSMENT
58M HTN, HLD, DM, CVA with residual L-sided deficits, Mobitz II s/p [employer] AICD who presented with episodes of chest pain explained as heartburn that progressed to worsening SOB. Pt found to have LAURA, lactate, and hypoxic that worsened with IVF, subsequently placed on bilevel.    Acute CHF related to possible missed MI vs. stress myopathy  TTE without gross evidence of mechanical complication  given degree of hypoxia suspect that there is an additional contributor i.e. viral illness  remains without chest pain  -hep gtt x total 72 hours, DAPT for ACS  -cont diuresis with bumex gtt  -cont hydralazine for vasodilation though pulse pressure appears to be overall improving.  -would allow tachycardia as this is likely compensatory. will need eventual bblocker and ARB.  -respiratory status precludes coronary anatomy evaluation. given overall improvement and ongoing sepsis without anginal symptoms will hold on cardiac catheterization. no indication for mechanical circulatory support.

## 2023-09-27 NOTE — PROGRESS NOTE ADULT - ASSESSMENT
59 yo m pmhx CVA with mild left sided deficits, HTN, DM2, vertigo, HLD, Mobitz II s/pp Medtronic dual chamber ICD presenting to ICU for tx of:    Respiratory failure requiring NIV  Pulmonary edema  CHF exacerbation  NSTEMI  PNA  +entero/rhinovirus  LAURA      Plan  --monitor vitals per ICU policy  --bumex gtt, monitor u/o, frequent bmp, replace lytes prn  --heparin gtt, asa, plavix, statin, cardiology following; 12 lead ekg; add bb when bp tolerating, pt currently w/o CP; ef 20% on tte  --ceftriaxone day 2/5, fup cultures  pt noted with 102.8 temp tonight  --NIV prn for increased WOB  --trend h/h, monitor for s/s of bleeding  --monitor bun / creatinine, monitor u/o, avoid nephrotoxic agents  --supportive care for viral illness  --diet when tolerating and off NIV for prolonged periods  --ppi for gi prophylaxis  --full code status    CRITICAL CARE TIME SPENT:  40 minutes of critical care time spent providing medical care for patient's acute illness/conditions that impairs at least one vital organ system and/or poses a high risk of imminent or life threatening deterioration in the patient's condition. It includes time spent evaluating and treating the patient's acute illness as well as time spent reviewing labs, radiology, discussing goals of care with patient and/or patient's family, and discussing the case with a multidisciplinary team, in an effort to prevent further life threatening deterioration or end organ damage. This time is independent of any procedures performed.

## 2023-09-27 NOTE — PROGRESS NOTE ADULT - SUBJECTIVE AND OBJECTIVE BOX
History of Present Illness:   57 yo m pmhx CVA with mild left sided deficits, HTN, DM2, vertigo, HLD, Mobitz II s/pp Medtronic dual chamber ICD (12/21/22) biba from home with complaints of sob/chest pain.  Per patient's wife cp began yesterday however persistent today and with associated worsening sob.  In ED patient tachypneic, tachycardic.  Labs significant for Trop 6791.6, Cr 1.42, bnp and lactate 2.9.  Patient given 2L IVF, lactate worsening, rapidly worsening sob and hypoxia.  Patient seen at bedside, on NRB with spo2 88-92% tachypenic, appears uncomfortable.  Coarse b/l.  Bedside POCUS exam performed, diffuse b lines, ivf plump, difficultly visualizing chambers however overall function appears decreased based off his previous tte.  Patient ordered for Bipap and lasix 80mg.  Stat TTE ordered.  Admit to ICU.     INTERVAL HPI/OVERNIGHT EVENTS: Patient seen and examined at bedside, Febrile this morning, not tolerating nasal canula overnight, placed back on BiPAP    SUBJECTIVE: Patient seen and examined at bedside.     ROS: All negative except as listed above.    VITAL SIGNS:  ICU Vital Signs Last 24 Hrs  T(C): 38.6 (27 Sep 2023 11:00), Max: 39.1 (27 Sep 2023 06:00)  T(F): 101.5 (27 Sep 2023 11:00), Max: 102.4 (27 Sep 2023 06:00)  HR: 116 (27 Sep 2023 11:00) (105 - 133)  BP: 134/97 (27 Sep 2023 11:00) (100/74 - 150/98)  BP(mean): 109 (27 Sep 2023 11:00) (83 - 120)  ABP: --  ABP(mean): --  RR: 21 (27 Sep 2023 11:00) (0 - 34)  SpO2: 100% (27 Sep 2023 11:00) (90% - 100%)        Plateau pressure:   P/F ratio:     09-26 @ 07:01  -  09-27 @ 07:00  --------------------------------------------------------  IN: 1373 mL / OUT: 5012 mL / NET: -3639 mL    09-27 @ 07:01  - 09-27 @ 11:24  --------------------------------------------------------  IN: 48 mL / OUT: 485 mL / NET: -437 mL      CAPILLARY BLOOD GLUCOSE      POCT Blood Glucose.: 208 mg/dL (27 Sep 2023 11:09)      ECG: reviewed.    Imaging:  COMPARISON: 12/27/2022 CT chest    FINDINGS:    PULMONARY ARTERIES: No pulmonary embolism detected; please note that few   of the segmental and subsegmental pulmonary branches are limited in   assessment secondary to motion and streak artifact.    MEDIASTINUM:Enlarged left atrium and left ventricle. Coronary   atherosclerosis. Small pericardial effusion. Thoracic aorta normal   caliber.  No large mediastinal lymph nodes. 3.3 x 2.8 cm lesion in the   superior mediastinum compatible with substernal extension of thyroid   goiter, unchanged compared to 12/27/2022.    AIRWAYS, LUNGS, PLEURA:. Trachea and mainstem bronchi patent. Patchy and   confluent ground-glass and consolidative opacities primarily in the   bilateral upper and lower lobes. Small bilateral pleural effusions.    IMAGED ABDOMEN: See separately reported CT abdomen and pelvis.    SOFT TISSUES: Unremarkable.    BONES: Unremarkable.      IMPRESSION:.    No pulmonary embolism detected.    Patchy and confluent ground-glass and consolidative opacities primarily   in the bilateral upper and lower lobes; findings may represent a   combination of infection and edema.    Small bilateral pleural effusions.    --- End of Report ---      PHYSICAL EXAM:    GENERAL: NAD, lying in bed, mild distress  HEAD:  Atraumatic, normocephalic  EYES: EOMI, PERRLA, conjunctiva and sclera clear  NECK: Supple, trachea midline, no JVD  HEART: Regular rate and rhythm, no murmurs, rubs, or gallops  LUNGS: Unlabored respirations.  Clear to auscultation bilaterally, no crackles, wheezing, or rhonchi  ABDOMEN: Soft, nontender, nondistended, +BS  EXTREMITIES: 2+ peripheral pulses bilaterally, cap refill<2 secs. No clubbing, cyanosis, or edema  NERVOUS SYSTEM:  A&Ox3, following commands, moving all extremities, no focal deficits   SKIN: No rashes or lesions    MEDICATIONS:  MEDICATIONS  (STANDING):  aspirin enteric coated 81 milliGRAM(s) Oral daily  atorvastatin 80 milliGRAM(s) Oral at bedtime  buMETAnide Infusion 1 mG/Hr (5 mL/Hr) IV Continuous <Continuous>  cefTRIAXone   IVPB 1000 milliGRAM(s) IV Intermittent every 24 hours  chlorhexidine 2% Cloths 1 Application(s) Topical <User Schedule>  clopidogrel Tablet 75 milliGRAM(s) Oral daily  heparin  Infusion. 1100 Unit(s)/Hr (11 mL/Hr) IV Continuous <Continuous>  hydrALAZINE Injectable 10 milliGRAM(s) IV Push every 6 hours  influenza   Vaccine 0.5 milliLiter(s) IntraMuscular once  insulin lispro (ADMELOG) corrective regimen sliding scale   SubCutaneous every 6 hours  pantoprazole  Injectable 40 milliGRAM(s) IV Push two times a day    MEDICATIONS  (PRN):  albuterol    0.083% 2.5 milliGRAM(s) Nebulizer every 6 hours PRN Shortness of Breath and/or Wheezing      ALLERGIES:  Allergies    No Known Allergies    Intolerances        LABS:                        15.6   12.86 )-----------( 271      ( 27 Sep 2023 08:30 )             49.1     09-27    140  |  106  |  30<H>  ----------------------------<  205<H>  4.2   |  24  |  1.72<H>    Ca    7.9<L>      27 Sep 2023 09:25  Phos  5.0     09-27  Mg     2.4     09-27    TPro  7.0  /  Alb  2.6<L>  /  TBili  0.5  /  DBili  x   /  AST  101<H>  /  ALT  39  /  AlkPhos  64  09-27    PT/INR - ( 26 Sep 2023 01:09 )   PT: 11.5 sec;   INR: 0.96 ratio         PTT - ( 27 Sep 2023 09:25 )  PTT:52.2 sec  Urinalysis Basic - ( 27 Sep 2023 09:25 )    Color: x / Appearance: x / SG: x / pH: x  Gluc: 205 mg/dL / Ketone: x  / Bili: x / Urobili: x   Blood: x / Protein: x / Nitrite: x   Leuk Esterase: x / RBC: x / WBC x   Sq Epi: x / Non Sq Epi: x / Bacteria: x      ABG:  pH, Arterial: 7.48 (09-26-23 @ 20:12)  pCO2, Arterial: 32 mmHg (09-26-23 @ 20:12)  pO2, Arterial: 93 mmHg (09-26-23 @ 20:12)  pH, Arterial: 7.46 (09-26-23 @ 16:25)  pCO2, Arterial: 31 mmHg (09-26-23 @ 16:25)  pO2, Arterial: 118 mmHg (09-26-23 @ 16:25)      vBG:    Micro:    Culture - Blood (collected 09-26-23 @ 07:55)  Source: .Blood Blood  Preliminary Report (09-27-23 @ 11:02):    No growth at 24 hours          RADIOLOGY & ADDITIONAL TESTS: Reviewed. History of Present Illness:   59 yo m pmhx CVA with mild left sided deficits, HTN, DM2, vertigo, HLD, Mobitz II s/pp Medtronic dual chamber ICD (12/21/22) biba from home with complaints of sob/chest pain.  Per patient's wife cp began yesterday however persistent today and with associated worsening sob.  In ED patient tachypneic, tachycardic.  Labs significant for Trop 6791.6, Cr 1.42, bnp and lactate 2.9.  Patient given 2L IVF, lactate worsening, rapidly worsening sob and hypoxia.  Patient seen at bedside, on NRB with spo2 88-92% tachypenic, appears uncomfortable.  Coarse b/l.  Bedside POCUS exam performed, diffuse b lines, ivf plump, difficultly visualizing chambers however overall function appears decreased based off his previous tte.  Patient ordered for Bipap and lasix 80mg.  Stat TTE ordered.  Admit to ICU.     INTERVAL HPI/OVERNIGHT EVENTS: Patient seen and examined at bedside, Febrile this morning, not tolerating nasal canula overnight, placed back on BiPAP    SUBJECTIVE: Patient seen and examined at bedside.     ROS: All negative except as listed above.    VITAL SIGNS:  ICU Vital Signs Last 24 Hrs  T(C): 38.6 (27 Sep 2023 11:00), Max: 39.1 (27 Sep 2023 06:00)  T(F): 101.5 (27 Sep 2023 11:00), Max: 102.4 (27 Sep 2023 06:00)  HR: 116 (27 Sep 2023 11:00) (105 - 133)  BP: 134/97 (27 Sep 2023 11:00) (100/74 - 150/98)  BP(mean): 109 (27 Sep 2023 11:00) (83 - 120)  ABP: --  ABP(mean): --  RR: 21 (27 Sep 2023 11:00) (0 - 34)  SpO2: 100% (27 Sep 2023 11:00) (90% - 100%)            09-26 @ 07:01  -  09-27 @ 07:00  --------------------------------------------------------  IN: 1373 mL / OUT: 5012 mL / NET: -3639 mL    09-27 @ 07:01  - 09-27 @ 11:24  --------------------------------------------------------  IN: 48 mL / OUT: 485 mL / NET: -437 mL      CAPILLARY BLOOD GLUCOSE      POCT Blood Glucose.: 208 mg/dL (27 Sep 2023 11:09)      ECG: reviewed.    Imaging:  COMPARISON: 12/27/2022 CT chest    FINDINGS:    PULMONARY ARTERIES: No pulmonary embolism detected; please note that few   of the segmental and subsegmental pulmonary branches are limited in   assessment secondary to motion and streak artifact.    MEDIASTINUM:Enlarged left atrium and left ventricle. Coronary   atherosclerosis. Small pericardial effusion. Thoracic aorta normal   caliber.  No large mediastinal lymph nodes. 3.3 x 2.8 cm lesion in the   superior mediastinum compatible with substernal extension of thyroid   goiter, unchanged compared to 12/27/2022.    AIRWAYS, LUNGS, PLEURA:. Trachea and mainstem bronchi patent. Patchy and   confluent ground-glass and consolidative opacities primarily in the   bilateral upper and lower lobes. Small bilateral pleural effusions.    IMAGED ABDOMEN: See separately reported CT abdomen and pelvis.    SOFT TISSUES: Unremarkable.    BONES: Unremarkable.      IMPRESSION:.    No pulmonary embolism detected.    Patchy and confluent ground-glass and consolidative opacities primarily   in the bilateral upper and lower lobes; findings may represent a   combination of infection and edema.    Small bilateral pleural effusions.    --- End of Report ---      PHYSICAL EXAM:    GENERAL: NAD, lying in bed, mild distress  HEAD:  Atraumatic, normocephalic  EYES: EOMI, PERRLA, conjunctiva and sclera clear  NECK: Supple, trachea midline, no JVD  HEART: Sinus tachycardia, no murmurs, rubs, or gallops  LUNGS: Crackles b/l lower lobes, NO wheezing, or rhonchi  ABDOMEN: Soft, nontender, nondistended, +BS  EXTREMITIES: 2+ peripheral pulses bilaterally, cap refill<2 secs. No clubbing, cyanosis, or edema  NERVOUS SYSTEM:  A&Ox3, following commands, moving all extremities, no focal deficits   SKIN: No rashes or lesions    MEDICATIONS:  MEDICATIONS  (STANDING):  aspirin enteric coated 81 milliGRAM(s) Oral daily  atorvastatin 80 milliGRAM(s) Oral at bedtime  buMETAnide Infusion 1 mG/Hr (5 mL/Hr) IV Continuous <Continuous>  cefTRIAXone   IVPB 1000 milliGRAM(s) IV Intermittent every 24 hours  chlorhexidine 2% Cloths 1 Application(s) Topical <User Schedule>  clopidogrel Tablet 75 milliGRAM(s) Oral daily  heparin  Infusion. 1100 Unit(s)/Hr (11 mL/Hr) IV Continuous <Continuous>  hydrALAZINE Injectable 10 milliGRAM(s) IV Push every 6 hours  influenza   Vaccine 0.5 milliLiter(s) IntraMuscular once  insulin lispro (ADMELOG) corrective regimen sliding scale   SubCutaneous every 6 hours  pantoprazole  Injectable 40 milliGRAM(s) IV Push two times a day    MEDICATIONS  (PRN):  albuterol    0.083% 2.5 milliGRAM(s) Nebulizer every 6 hours PRN Shortness of Breath and/or Wheezing      ALLERGIES:  Allergies    No Known Allergies    Intolerances        LABS:                        15.6   12.86 )-----------( 271      ( 27 Sep 2023 08:30 )             49.1     09-27    140  |  106  |  30<H>  ----------------------------<  205<H>  4.2   |  24  |  1.72<H>    Ca    7.9<L>      27 Sep 2023 09:25  Phos  5.0     09-27  Mg     2.4     09-27    TPro  7.0  /  Alb  2.6<L>  /  TBili  0.5  /  DBili  x   /  AST  101<H>  /  ALT  39  /  AlkPhos  64  09-27    PT/INR - ( 26 Sep 2023 01:09 )   PT: 11.5 sec;   INR: 0.96 ratio         PTT - ( 27 Sep 2023 09:25 )  PTT:52.2 sec  Urinalysis Basic - ( 27 Sep 2023 09:25 )    Color: x / Appearance: x / SG: x / pH: x  Gluc: 205 mg/dL / Ketone: x  / Bili: x / Urobili: x   Blood: x / Protein: x / Nitrite: x   Leuk Esterase: x / RBC: x / WBC x   Sq Epi: x / Non Sq Epi: x / Bacteria: x      ABG:  pH, Arterial: 7.48 (09-26-23 @ 20:12)  pCO2, Arterial: 32 mmHg (09-26-23 @ 20:12)  pO2, Arterial: 93 mmHg (09-26-23 @ 20:12)  pH, Arterial: 7.46 (09-26-23 @ 16:25)  pCO2, Arterial: 31 mmHg (09-26-23 @ 16:25)  pO2, Arterial: 118 mmHg (09-26-23 @ 16:25)      vBG:    Micro:    Culture - Blood (collected 09-26-23 @ 07:55)  Source: .Blood Blood  Preliminary Report (09-27-23 @ 11:02):    No growth at 24 hours          RADIOLOGY & ADDITIONAL TESTS: Reviewed. History of Present Illness:   59 yo m pmhx CVA with mild left sided deficits, HTN, DM2, vertigo, HLD, Mobitz II s/pp Medtronic dual chamber ICD (12/21/22) biba from home with complaints of sob/chest pain.  Per patient's wife cp began yesterday however persistent today and with associated worsening sob.  In ED patient tachypneic, tachycardic.  Labs significant for Trop 6791.6, Cr 1.42, bnp and lactate 2.9.  Patient given 2L IVF, lactate worsening, rapidly worsening sob and hypoxia.  Patient seen at bedside, on NRB with spo2 88-92% tachypenic, appears uncomfortable.  Coarse b/l.  Bedside POCUS exam performed, diffuse b lines, ivf plump, difficultly visualizing chambers however overall function appears decreased based off his previous tte.  Patient ordered for Bipap and lasix 80mg.  Stat TTE ordered.  Admit to ICU.     INTERVAL HPI/OVERNIGHT EVENTS: Patient seen and examined at bedside, Febrile this morning, not tolerating nasal canula overnight, placed back on BiPAP for tachypnea. Troponins peaked. Put out over 2.5L of urine output on bumex gtt.     SUBJECTIVE: Patient seen and examined at bedside.   ROS: All negative except as listed above.    VITAL SIGNS:  ICU Vital Signs Last 24 Hrs  T(C): 38.6 (27 Sep 2023 11:00), Max: 39.1 (27 Sep 2023 06:00)  T(F): 101.5 (27 Sep 2023 11:00), Max: 102.4 (27 Sep 2023 06:00)  HR: 116 (27 Sep 2023 11:00) (105 - 133)  BP: 134/97 (27 Sep 2023 11:00) (100/74 - 150/98)  BP(mean): 109 (27 Sep 2023 11:00) (83 - 120)  ABP: --  ABP(mean): --  RR: 21 (27 Sep 2023 11:00) (0 - 34)  SpO2: 100% (27 Sep 2023 11:00) (90% - 100%)      09-26 @ 07:01  -  09-27 @ 07:00  --------------------------------------------------------  IN: 1373 mL / OUT: 5012 mL / NET: -3639 mL    09-27 @ 07:01  - 09-27 @ 11:24  --------------------------------------------------------  IN: 48 mL / OUT: 485 mL / NET: -437 mL      CAPILLARY BLOOD GLUCOSE  POCT Blood Glucose.: 208 mg/dL (27 Sep 2023 11:09)    ECG: reviewed.    Imaging:  COMPARISON: 12/27/2022 CT chest    FINDINGS:    PULMONARY ARTERIES: No pulmonary embolism detected; please note that few   of the segmental and subsegmental pulmonary branches are limited in   assessment secondary to motion and streak artifact.    MEDIASTINUM:Enlarged left atrium and left ventricle. Coronary   atherosclerosis. Small pericardial effusion. Thoracic aorta normal   caliber.  No large mediastinal lymph nodes. 3.3 x 2.8 cm lesion in the   superior mediastinum compatible with substernal extension of thyroid   goiter, unchanged compared to 12/27/2022.    AIRWAYS, LUNGS, PLEURA:. Trachea and mainstem bronchi patent. Patchy and   confluent ground-glass and consolidative opacities primarily in the   bilateral upper and lower lobes. Small bilateral pleural effusions.    IMAGED ABDOMEN: See separately reported CT abdomen and pelvis.    SOFT TISSUES: Unremarkable.    BONES: Unremarkable.      IMPRESSION:.    No pulmonary embolism detected.    Patchy and confluent ground-glass and consolidative opacities primarily   in the bilateral upper and lower lobes; findings may represent a   combination of infection and edema.    Small bilateral pleural effusions.    --- End of Report ---      PHYSICAL EXAM:    GENERAL: NAD, lying in bed, mild distress  HEAD:  Atraumatic, normocephalic  EYES: EOMI, PERRLA, conjunctiva and sclera clear  NECK: Supple, trachea midline, no JVD  HEART: Sinus tachycardia, no murmurs, rubs, or gallops  LUNGS: Crackles b/l lower lobes, NO wheezing, or rhonchi. +pigtail on right   ABDOMEN: Soft, nontender, nondistended, +BS  EXTREMITIES: 2+ peripheral pulses bilaterally, cap refill<2 secs. No clubbing, cyanosis. +2 edema   NERVOUS SYSTEM:  Sedated, RAAS -1, following commands and moving all extremities.   SKIN: No rashes or lesions    MEDICATIONS:  MEDICATIONS  (STANDING):  aspirin enteric coated 81 milliGRAM(s) Oral daily  atorvastatin 80 milliGRAM(s) Oral at bedtime  buMETAnide Infusion 1 mG/Hr (5 mL/Hr) IV Continuous <Continuous>  cefTRIAXone   IVPB 1000 milliGRAM(s) IV Intermittent every 24 hours  chlorhexidine 2% Cloths 1 Application(s) Topical <User Schedule>  clopidogrel Tablet 75 milliGRAM(s) Oral daily  heparin  Infusion. 1100 Unit(s)/Hr (11 mL/Hr) IV Continuous <Continuous>  hydrALAZINE Injectable 10 milliGRAM(s) IV Push every 6 hours  influenza   Vaccine 0.5 milliLiter(s) IntraMuscular once  insulin lispro (ADMELOG) corrective regimen sliding scale   SubCutaneous every 6 hours  pantoprazole  Injectable 40 milliGRAM(s) IV Push two times a day    MEDICATIONS  (PRN):  albuterol    0.083% 2.5 milliGRAM(s) Nebulizer every 6 hours PRN Shortness of Breath and/or Wheezing      ALLERGIES:  Allergies    No Known Allergies    Intolerances        LABS:                        15.6   12.86 )-----------( 271      ( 27 Sep 2023 08:30 )             49.1     09-27    140  |  106  |  30<H>  ----------------------------<  205<H>  4.2   |  24  |  1.72<H>    Ca    7.9<L>      27 Sep 2023 09:25  Phos  5.0     09-27  Mg     2.4     09-27    TPro  7.0  /  Alb  2.6<L>  /  TBili  0.5  /  DBili  x   /  AST  101<H>  /  ALT  39  /  AlkPhos  64  09-27    PT/INR - ( 26 Sep 2023 01:09 )   PT: 11.5 sec;   INR: 0.96 ratio         PTT - ( 27 Sep 2023 09:25 )  PTT:52.2 sec  Urinalysis Basic - ( 27 Sep 2023 09:25 )    Color: x / Appearance: x / SG: x / pH: x  Gluc: 205 mg/dL / Ketone: x  / Bili: x / Urobili: x   Blood: x / Protein: x / Nitrite: x   Leuk Esterase: x / RBC: x / WBC x   Sq Epi: x / Non Sq Epi: x / Bacteria: x      ABG:  pH, Arterial: 7.48 (09-26-23 @ 20:12)  pCO2, Arterial: 32 mmHg (09-26-23 @ 20:12)  pO2, Arterial: 93 mmHg (09-26-23 @ 20:12)  pH, Arterial: 7.46 (09-26-23 @ 16:25)  pCO2, Arterial: 31 mmHg (09-26-23 @ 16:25)  pO2, Arterial: 118 mmHg (09-26-23 @ 16:25)    Micro:  Culture - Blood (collected 09-26-23 @ 07:55)  Source: .Blood Blood  Preliminary Report (09-27-23 @ 11:02):    No growth at 24 hours          RADIOLOGY & ADDITIONAL TESTS: Reviewed.

## 2023-09-27 NOTE — PROGRESS NOTE ADULT - SUBJECTIVE AND OBJECTIVE BOX
59 yo m pmhx CVA with mild left sided deficits, HTN, DM2, vertigo, HLD, Mobitz II s/pp Med[employer]onic dual chamber ICD (12/21/22) biba from home with complaints of sob/chest pain.  Per patient's wife cp began yesterday however persistent today and with associated worsening sob.  In ED patient tachypneic, tachycardic.  Labs significant for Trop 6791.6, Cr 1.42, bnp and lactate 2.9.  Patient given 2L IVF, lactate worsening, rapidly worsening sob and hypoxia.  Patient seen at bedside, on NRB with spo2 88-92% tachypenic, appears uncomfortable.  Coarse b/l.  Bedside POCUS exam performed, diffuse b lines, ivf plump, difficultly visualizing chambers however overall function appears decreased based off his previous tte.  Patient ordered for Bipap and lasix 80mg.  Stat TTE ordered.  Admit to ICU.       (26 Sep 2023 06:34)      24 hr events: Patient HDS, noted with temp 102.7 this evening, cultures sent.       ## ROS: Patient states he is "feeling better," however does feel febrile. Denies CP, SOB, or HA.       ## Labs:  CBC:                        13.4   12.52 )-----------( 280      ( 28 Sep 2023 06:40 )             41.7     Chem:  09-27    138  |  101  |  31<H>  ----------------------------<  289<H>  3.8   |  26  |  1.90<H>    Ca    8.1<L>      27 Sep 2023 15:50  Phos  4.0     09-27  Mg     2.3     09-27    TPro  7.0  /  Alb  2.6<L>  /  TBili  0.5  /  DBili  x   /  AST  101<H>  /  ALT  39  /  AlkPhos  64  09-27    Coags:  PTT - ( 27 Sep 2023 22:00 )  PTT:59.0 sec        ## Medications:  cefTRIAXone   IVPB 1000 milliGRAM(s) IV Intermittent every 24 hours  buMETAnide Infusion 1 mG/Hr IV Continuous <Continuous>  hydrALAZINE Injectable 10 milliGRAM(s) IV Push every 6 hours  albuterol    0.083% 2.5 milliGRAM(s) Nebulizer every 6 hours PRN  atorvastatin 80 milliGRAM(s) Oral at bedtime  insulin lispro (ADMELOG) corrective regimen sliding scale   SubCutaneous Before meals and at bedtime  aspirin enteric coated 81 milliGRAM(s) Oral daily  clopidogrel Tablet 75 milliGRAM(s) Oral daily  heparin  Infusion. 1100 Unit(s)/Hr IV Continuous <Continuous>  pantoprazole  Injectable 40 milliGRAM(s) IV Push two times a day        ## Vitals:  T(C): 39.5 (09-28-23 @ 05:00), Max: 39.5 (09-28-23 @ 05:00)  HR: 128 (09-28-23 @ 05:11) (107 - 140)  BP: 125/89 (09-28-23 @ 03:00) (83/72 - 134/97)  BP(mean): 99 (09-28-23 @ 03:00) (77 - 109)  RR: 24 (09-28-23 @ 03:00) (10 - 37)  SpO2: 92% (09-28-23 @ 05:11) (89% - 100%)  ABG: ABG - ( 26 Sep 2023 20:12 )  pH, Arterial: 7.48  pH, Blood: x     /  pCO2: 32    /  pO2: 93    / HCO3: 24    / Base Excess: 0.9   /  SaO2: 98.8            09-26 @ 07:01  -  09-27 @ 07:00  --------------------------------------------------------  IN: 1373 mL / OUT: 5012 mL / NET: -3639 mL    09-27 @ 07:01  -  09-28 @ 06:51  --------------------------------------------------------  IN: 1175.5 mL / OUT: 2660 mL / NET: -1484.5 mL        ## P/E:  Gen: lying comfortably in bed in no apparent distress  HEENT: JONI EOMI  Resp: CTA B/L no c/r/w  CVS: S1S2 no m/r/g  Abd: soft NT/ND +BS  Ext: no c/c/e  Neuro: A&Ox3        CODE STATUS: [ x ] full code

## 2023-09-28 LAB
ALBUMIN SERPL ELPH-MCNC: 2.2 G/DL — LOW (ref 3.3–5)
ALP SERPL-CCNC: 56 U/L — SIGNIFICANT CHANGE UP (ref 40–120)
ALT FLD-CCNC: 37 U/L — SIGNIFICANT CHANGE UP (ref 12–78)
ANION GAP SERPL CALC-SCNC: 10 MMOL/L — SIGNIFICANT CHANGE UP (ref 5–17)
APTT BLD: 65.3 SEC — HIGH (ref 24.5–35.6)
AST SERPL-CCNC: 55 U/L — HIGH (ref 15–37)
BASOPHILS # BLD AUTO: 0.02 K/UL — SIGNIFICANT CHANGE UP (ref 0–0.2)
BASOPHILS NFR BLD AUTO: 0.2 % — SIGNIFICANT CHANGE UP (ref 0–2)
BILIRUB SERPL-MCNC: 0.7 MG/DL — SIGNIFICANT CHANGE UP (ref 0.2–1.2)
BUN SERPL-MCNC: 35 MG/DL — HIGH (ref 7–23)
CALCIUM SERPL-MCNC: 7.7 MG/DL — LOW (ref 8.5–10.1)
CHLORIDE SERPL-SCNC: 102 MMOL/L — SIGNIFICANT CHANGE UP (ref 96–108)
CO2 SERPL-SCNC: 25 MMOL/L — SIGNIFICANT CHANGE UP (ref 22–31)
CREAT SERPL-MCNC: 1.91 MG/DL — HIGH (ref 0.5–1.3)
EGFR: 41 ML/MIN/1.73M2 — LOW
EOSINOPHIL # BLD AUTO: 0 K/UL — SIGNIFICANT CHANGE UP (ref 0–0.5)
EOSINOPHIL NFR BLD AUTO: 0 % — SIGNIFICANT CHANGE UP (ref 0–6)
GLUCOSE BLDC GLUCOMTR-MCNC: 211 MG/DL — HIGH (ref 70–99)
GLUCOSE BLDC GLUCOMTR-MCNC: 253 MG/DL — HIGH (ref 70–99)
GLUCOSE BLDC GLUCOMTR-MCNC: 256 MG/DL — HIGH (ref 70–99)
GLUCOSE BLDC GLUCOMTR-MCNC: 283 MG/DL — HIGH (ref 70–99)
GLUCOSE BLDC GLUCOMTR-MCNC: 375 MG/DL — HIGH (ref 70–99)
GLUCOSE SERPL-MCNC: 280 MG/DL — HIGH (ref 70–99)
HCT VFR BLD CALC: 41.7 % — SIGNIFICANT CHANGE UP (ref 39–50)
HGB BLD-MCNC: 13.4 G/DL — SIGNIFICANT CHANGE UP (ref 13–17)
IMM GRANULOCYTES NFR BLD AUTO: 0.6 % — SIGNIFICANT CHANGE UP (ref 0–0.9)
LYMPHOCYTES # BLD AUTO: 0.7 K/UL — LOW (ref 1–3.3)
LYMPHOCYTES # BLD AUTO: 5.6 % — LOW (ref 13–44)
MAGNESIUM SERPL-MCNC: 2.2 MG/DL — SIGNIFICANT CHANGE UP (ref 1.6–2.6)
MCHC RBC-ENTMCNC: 25.2 PG — LOW (ref 27–34)
MCHC RBC-ENTMCNC: 32.1 G/DL — SIGNIFICANT CHANGE UP (ref 32–36)
MCV RBC AUTO: 78.5 FL — LOW (ref 80–100)
MONOCYTES # BLD AUTO: 0.84 K/UL — SIGNIFICANT CHANGE UP (ref 0–0.9)
MONOCYTES NFR BLD AUTO: 6.7 % — SIGNIFICANT CHANGE UP (ref 2–14)
NEUTROPHILS # BLD AUTO: 10.89 K/UL — HIGH (ref 1.8–7.4)
NEUTROPHILS NFR BLD AUTO: 86.9 % — HIGH (ref 43–77)
NRBC # BLD: 0 /100 WBCS — SIGNIFICANT CHANGE UP (ref 0–0)
NT-PROBNP SERPL-SCNC: HIGH PG/ML (ref 0–125)
PHOSPHATE SERPL-MCNC: 2.6 MG/DL — SIGNIFICANT CHANGE UP (ref 2.5–4.5)
PLATELET # BLD AUTO: 280 K/UL — SIGNIFICANT CHANGE UP (ref 150–400)
POTASSIUM SERPL-MCNC: 3.5 MMOL/L — SIGNIFICANT CHANGE UP (ref 3.5–5.3)
POTASSIUM SERPL-SCNC: 3.5 MMOL/L — SIGNIFICANT CHANGE UP (ref 3.5–5.3)
PROT SERPL-MCNC: 6.2 GM/DL — SIGNIFICANT CHANGE UP (ref 6–8.3)
RAPID RVP RESULT: DETECTED
RBC # BLD: 5.31 M/UL — SIGNIFICANT CHANGE UP (ref 4.2–5.8)
RBC # FLD: 14.8 % — HIGH (ref 10.3–14.5)
RV+EV RNA SPEC QL NAA+PROBE: DETECTED
SARS-COV-2 RNA SPEC QL NAA+PROBE: SIGNIFICANT CHANGE UP
SODIUM SERPL-SCNC: 137 MMOL/L — SIGNIFICANT CHANGE UP (ref 135–145)
T3 SERPL-MCNC: 69 NG/DL — LOW (ref 80–200)
T4 AB SER-ACNC: 10.3 UG/DL — SIGNIFICANT CHANGE UP (ref 4.6–12)
T4 FREE SERPL-MCNC: 2 NG/DL — HIGH (ref 0.9–1.8)
WBC # BLD: 12.52 K/UL — HIGH (ref 3.8–10.5)
WBC # FLD AUTO: 12.52 K/UL — HIGH (ref 3.8–10.5)

## 2023-09-28 PROCEDURE — 99233 SBSQ HOSP IP/OBS HIGH 50: CPT

## 2023-09-28 PROCEDURE — 99291 CRITICAL CARE FIRST HOUR: CPT

## 2023-09-28 RX ORDER — BUMETANIDE 0.25 MG/ML
2 INJECTION INTRAMUSCULAR; INTRAVENOUS
Refills: 0 | Status: DISCONTINUED | OUTPATIENT
Start: 2023-09-28 | End: 2023-10-09

## 2023-09-28 RX ORDER — IBUPROFEN 200 MG
600 TABLET ORAL ONCE
Refills: 0 | Status: COMPLETED | OUTPATIENT
Start: 2023-09-28 | End: 2023-09-28

## 2023-09-28 RX ORDER — INSULIN LISPRO 100/ML
VIAL (ML) SUBCUTANEOUS AT BEDTIME
Refills: 0 | Status: DISCONTINUED | OUTPATIENT
Start: 2023-09-28 | End: 2023-10-13

## 2023-09-28 RX ORDER — INSULIN GLARGINE 100 [IU]/ML
10 INJECTION, SOLUTION SUBCUTANEOUS AT BEDTIME
Refills: 0 | Status: DISCONTINUED | OUTPATIENT
Start: 2023-09-29 | End: 2023-10-03

## 2023-09-28 RX ORDER — INSULIN GLARGINE 100 [IU]/ML
10 INJECTION, SOLUTION SUBCUTANEOUS ONCE
Refills: 0 | Status: COMPLETED | OUTPATIENT
Start: 2023-09-28 | End: 2023-09-28

## 2023-09-28 RX ORDER — DEXTROSE 50 % IN WATER 50 %
15 SYRINGE (ML) INTRAVENOUS ONCE
Refills: 0 | Status: DISCONTINUED | OUTPATIENT
Start: 2023-09-28 | End: 2023-10-13

## 2023-09-28 RX ORDER — ACETAMINOPHEN 500 MG
1000 TABLET ORAL ONCE
Refills: 0 | Status: COMPLETED | OUTPATIENT
Start: 2023-09-28 | End: 2023-09-28

## 2023-09-28 RX ORDER — POTASSIUM CHLORIDE 20 MEQ
10 PACKET (EA) ORAL
Refills: 0 | Status: COMPLETED | OUTPATIENT
Start: 2023-09-28 | End: 2023-09-28

## 2023-09-28 RX ORDER — INSULIN LISPRO 100/ML
VIAL (ML) SUBCUTANEOUS
Refills: 0 | Status: DISCONTINUED | OUTPATIENT
Start: 2023-09-28 | End: 2023-10-13

## 2023-09-28 RX ORDER — INSULIN LISPRO 100/ML
3 VIAL (ML) SUBCUTANEOUS
Refills: 0 | Status: DISCONTINUED | OUTPATIENT
Start: 2023-09-28 | End: 2023-10-03

## 2023-09-28 RX ADMIN — HEPARIN SODIUM 1150 UNIT(S)/HR: 5000 INJECTION INTRAVENOUS; SUBCUTANEOUS at 07:35

## 2023-09-28 RX ADMIN — Medication 100 MILLIEQUIVALENT(S): at 09:07

## 2023-09-28 RX ADMIN — PANTOPRAZOLE SODIUM 40 MILLIGRAM(S): 20 TABLET, DELAYED RELEASE ORAL at 18:00

## 2023-09-28 RX ADMIN — ATORVASTATIN CALCIUM 80 MILLIGRAM(S): 80 TABLET, FILM COATED ORAL at 21:29

## 2023-09-28 RX ADMIN — Medication 600 MILLIGRAM(S): at 15:32

## 2023-09-28 RX ADMIN — Medication 1000 MILLIGRAM(S): at 05:00

## 2023-09-28 RX ADMIN — Medication 3 UNIT(S): at 12:21

## 2023-09-28 RX ADMIN — HEPARIN SODIUM 1150 UNIT(S)/HR: 5000 INJECTION INTRAVENOUS; SUBCUTANEOUS at 00:34

## 2023-09-28 RX ADMIN — PANTOPRAZOLE SODIUM 40 MILLIGRAM(S): 20 TABLET, DELAYED RELEASE ORAL at 06:51

## 2023-09-28 RX ADMIN — Medication 10: at 11:23

## 2023-09-28 RX ADMIN — Medication 400 MILLIGRAM(S): at 11:10

## 2023-09-28 RX ADMIN — CHLORHEXIDINE GLUCONATE 1 APPLICATION(S): 213 SOLUTION TOPICAL at 06:55

## 2023-09-28 RX ADMIN — Medication 1000 MILLIGRAM(S): at 11:15

## 2023-09-28 RX ADMIN — Medication 100 MILLIEQUIVALENT(S): at 10:51

## 2023-09-28 RX ADMIN — Medication 100 MILLIEQUIVALENT(S): at 12:22

## 2023-09-28 RX ADMIN — HEPARIN SODIUM 1150 UNIT(S)/HR: 5000 INJECTION INTRAVENOUS; SUBCUTANEOUS at 22:43

## 2023-09-28 RX ADMIN — HEPARIN SODIUM 1150 UNIT(S)/HR: 5000 INJECTION INTRAVENOUS; SUBCUTANEOUS at 19:13

## 2023-09-28 RX ADMIN — Medication 6: at 15:47

## 2023-09-28 RX ADMIN — INSULIN GLARGINE 10 UNIT(S): 100 INJECTION, SOLUTION SUBCUTANEOUS at 13:08

## 2023-09-28 RX ADMIN — Medication 3 UNIT(S): at 16:44

## 2023-09-28 RX ADMIN — CLOPIDOGREL BISULFATE 75 MILLIGRAM(S): 75 TABLET, FILM COATED ORAL at 11:11

## 2023-09-28 RX ADMIN — Medication 600 MILLIGRAM(S): at 16:35

## 2023-09-28 RX ADMIN — HEPARIN SODIUM 1150 UNIT(S)/HR: 5000 INJECTION INTRAVENOUS; SUBCUTANEOUS at 08:50

## 2023-09-28 RX ADMIN — Medication 81 MILLIGRAM(S): at 11:11

## 2023-09-28 RX ADMIN — BUMETANIDE 2 MILLIGRAM(S): 0.25 INJECTION INTRAMUSCULAR; INTRAVENOUS at 13:09

## 2023-09-28 RX ADMIN — CEFTRIAXONE 100 MILLIGRAM(S): 500 INJECTION, POWDER, FOR SOLUTION INTRAMUSCULAR; INTRAVENOUS at 06:50

## 2023-09-28 RX ADMIN — BUMETANIDE 5 MG/HR: 0.25 INJECTION INTRAMUSCULAR; INTRAVENOUS at 08:06

## 2023-09-28 RX ADMIN — Medication 6: at 08:05

## 2023-09-28 RX ADMIN — Medication 1000 MILLIGRAM(S): at 04:36

## 2023-09-28 RX ADMIN — Medication 10 MILLIGRAM(S): at 11:13

## 2023-09-28 NOTE — PROGRESS NOTE ADULT - SUBJECTIVE AND OBJECTIVE BOX
CC: Patient is a 58y old  Male who presents with a chief complaint of CHF Exacerbation (28 Sep 2023 12:56)      ## HPI:HPI:  57 yo m pmhx CVA with mild left sided deficits, HTN, DM2, vertigo, HLD, Mobitz II s/pp Medtronic dual chamber ICD (12/21/22) biba from home with complaints of sob/chest pain.  Per patient's wife cp began yesterday however persistent today and with associated worsening sob.  In ED patient tachypneic, tachycardic.  Labs significant for Trop 6791.6, Cr 1.42, bnp and lactate 2.9.  Patient given 2L IVF, lactate worsening, rapidly worsening sob and hypoxia.  Patient seen at bedside, on NRB with spo2 88-92% tachypenic, appears uncomfortable.  Coarse b/l.  Bedside POCUS exam performed, diffuse b lines, ivf plump, difficultly visualizing chambers however overall function appears decreased based off his previous tte.  Patient ordered for Bipap and lasix 80mg.  Stat TTE ordered.  Admit to ICU.       (26 Sep 2023 06:34)      O/N: off bipap, still spiking fevers    ## ROS:  CONSTITUTIONAL:  no weight loss or night sweats  HEENT:  Eyes:  No diplopia or blurred vision. ENT:  No earache, sore throat or runny nose.  CARDIOVASCULAR:  No pressure, squeezing, tightness, heaviness or aching about the chest, neck, axilla or epigastrium.  RESPIRATORY:  No cough, shortness of breath, PND or orthopnea.  GASTROINTESTINAL:  No nausea, vomiting or diarrhea.  GENITOURINARY:  No dysuria, frequency or urgency.  MUSCULOSKELETAL:  No joint pain  SKIN:  No change in skin, hair or nails.  NEUROLOGIC:  No paresthesias, fasciculations, seizures or weakness.  PSYCHIATRIC:  No disorder of thought or mood.  HEMATOLOGICAL:  No easy bruising or bleeding.   ## EXAM  ICU Vital Signs Last 24 Hrs  T(C): 38.7 (28 Sep 2023 12:25), Max: 39.5 (28 Sep 2023 05:00)  T(F): 101.7 (28 Sep 2023 12:25), Max: 103.1 (28 Sep 2023 05:00)  HR: 123 (28 Sep 2023 13:30) (111 - 140)  BP: 117/85 (28 Sep 2023 13:30) (83/72 - 130/95)  BP(mean): 94 (28 Sep 2023 13:30) (76 - 105)  ABP: --  ABP(mean): --  RR: 23 (28 Sep 2023 13:30) (11 - 37)  SpO2: 95% (28 Sep 2023 13:30) (89% - 100%)    O2 Parameters below as of 28 Sep 2023 07:00  Patient On (Oxygen Delivery Method): nasal cannula  O2 Flow (L/min): 4        CON : NAD  EENT : EOMI, MMM  NECK : Full ROM  RESP : CTAB no increased WOB  CARD : rrr no m/r/g  ABD : S NT ND NABS no rebound  EXT : No edema  NEURO : AAOX3   ## Labs:  Lab Results:  CBC  CBC Full  -  ( 28 Sep 2023 06:40 )  WBC Count : 12.52 K/uL  RBC Count : 5.31 M/uL  Hemoglobin : 13.4 g/dL  Hematocrit : 41.7 %  Platelet Count - Automated : 280 K/uL  Mean Cell Volume : 78.5 fl  Mean Cell Hemoglobin : 25.2 pg  Mean Cell Hemoglobin Concentration : 32.1 g/dL  Auto Neutrophil # : 10.89 K/uL  Auto Lymphocyte # : 0.70 K/uL  Auto Monocyte # : 0.84 K/uL  Auto Eosinophil # : 0.00 K/uL  Auto Basophil # : 0.02 K/uL  Auto Neutrophil % : 86.9 %  Auto Lymphocyte % : 5.6 %  Auto Monocyte % : 6.7 %  Auto Eosinophil % : 0.0 %  Auto Basophil % : 0.2 %    .		Differential:	[] Automated		[] Manual  Chemistry                        13.4   12.52 )-----------( 280      ( 28 Sep 2023 06:40 )             41.7     09-28    137  |  102  |  35<H>  ----------------------------<  280<H>  3.5   |  25  |  1.91<H>    Ca    7.7<L>      28 Sep 2023 06:40  Phos  2.6     09-28  Mg     2.2     09-28    TPro  6.2  /  Alb  2.2<L>  /  TBili  0.7  /  DBili  x   /  AST  55<H>  /  ALT  37  /  AlkPhos  56  09-28    LIVER FUNCTIONS - ( 28 Sep 2023 06:40 )  Alb: 2.2 g/dL / Pro: 6.2 gm/dL / ALK PHOS: 56 U/L / ALT: 37 U/L / AST: 55 U/L / GGT: x           PTT - ( 28 Sep 2023 06:40 )  PTT:65.3 sec  Urinalysis Basic - ( 28 Sep 2023 06:40 )    Color: x / Appearance: x / SG: x / pH: x  Gluc: 280 mg/dL / Ketone: x  / Bili: x / Urobili: x   Blood: x / Protein: x / Nitrite: x   Leuk Esterase: x / RBC: x / WBC x   Sq Epi: x / Non Sq Epi: x / Bacteria: x        ABG - ( 26 Sep 2023 20:12 )  pH, Arterial: 7.48  pH, Blood: x     /  pCO2: 32    /  pO2: 93    / HCO3: 24    / Base Excess: 0.9   /  SaO2: 98.8                      MICROBIOLOGY/CULTURES:  Culture Results:   No growth at 24 hours (09-27 @ 02:40)  Culture Results:   No growth at 48 Hours (09-26 @ 07:55)  Culture Results:   <10,000 CFU/mL Normal Urogenital Tamela (09-26 @ 05:56)      RADIOLOGY RESULTS:        ## Medications:  MEDICATIONS  (STANDING):  aspirin enteric coated 81 milliGRAM(s) Oral daily  atorvastatin 80 milliGRAM(s) Oral at bedtime  buMETAnide Injectable 2 milliGRAM(s) IV Push two times a day  cefTRIAXone   IVPB 1000 milliGRAM(s) IV Intermittent every 24 hours  chlorhexidine 2% Cloths 1 Application(s) Topical <User Schedule>  clopidogrel Tablet 75 milliGRAM(s) Oral daily  heparin  Infusion. 1100 Unit(s)/Hr (11 mL/Hr) IV Continuous <Continuous>  hydrALAZINE Injectable 10 milliGRAM(s) IV Push every 6 hours  influenza   Vaccine 0.5 milliLiter(s) IntraMuscular once  insulin lispro (ADMELOG) corrective regimen sliding scale   SubCutaneous at bedtime  insulin lispro (ADMELOG) corrective regimen sliding scale   SubCutaneous three times a day before meals  insulin lispro Injectable (ADMELOG) 3 Unit(s) SubCutaneous three times a day before meals  pantoprazole  Injectable 40 milliGRAM(s) IV Push two times a day    ## O/E:I&O's Summary    27 Sep 2023 07:01  -  28 Sep 2023 07:00  --------------------------------------------------------  IN: 1241.5 mL / OUT: 3560 mL / NET: -2318.5 mL    28 Sep 2023 07:01  -  28 Sep 2023 13:57  --------------------------------------------------------  IN: 855.5 mL / OUT: 1025 mL / NET: -169.5 mL        POCUS :   DVT PPX hep drip  ## Code status:  Goals of care discussion: [] yes [ ] no  [x] full code  [ ] DNR  [ ] DNI  [ ] YULIYA

## 2023-09-28 NOTE — PROGRESS NOTE ADULT - ASSESSMENT
57 yo m pmhx CVA with mild left sided deficits, HTN, DM2, vertigo, HLD, Mobitz II s/pp Medtronic dual chamber ICD presenting to ICU for tx of:      CHF exacerbation  NSTEMI  PNA  +entero/rhinovirus  LAURA      Plan  --monitor vitals per ICU policy  --stop bumex drip change to bid as patient nearing euvolemia  --heparin gtt, asa, plavix, statin, cardiology following; 12 lead ekg; add bb when bp tolerating, pt currently w/o CP; ef 20% on tte stop hep drip at 72 hours  --ceftriaxone day 2/5, fup cultures  pt noted with 102.8 temp tonight  --niv as needed  -allow tachycardia  --trend h/h, monitor for s/s of bleeding  --monitor bun / creatinine, monitor u/o, avoid nephrotoxic agents  --supportive care for viral illness  --diet when tolerating and off NIV for prolonged periods  --ppi for gi prophylaxis  --full code status    CRITICAL CARE TIME SPENT:  40 minutes of critical care time spent providing medical care for patient's acute illness/conditions that impairs at least one vital organ system and/or poses a high risk of imminent or life threatening deterioration in the patient's condition. It includes time spent evaluating and treating the patient's acute illness as well as time spent reviewing labs, radiology, discussing goals of care with patient and/or patient's family, and discussing the case with a multidisciplinary team, in an effort to prevent further life threatening deterioration or end organ damage. This time is independent of any procedures performed.

## 2023-09-28 NOTE — PROGRESS NOTE ADULT - ASSESSMENT
58M HTN, HLD, DM, CVA with residual L-sided deficits, Mobitz II s/p Medtronic AICD who presented with episodes of chest pain explained as heartburn that progressed to worsening SOB. Pt found to have LAURA, lactate, and hypoxic that worsened with IVF, subsequently placed on bilevel. Currently on NC    Acute CHF related to possible missed MI vs. stress myopathy  TTE without gross evidence of mechanical complication  given degree of hypoxia suspect that there is an additional contributor i.e. viral illness  remains without chest pain  -hep gtt x total 72 hours, DAPT for ACS  -cont diuresis though appears to be nearing euvolemia  -would allow tachycardia as this is likely compensatory. will need eventual bblocker and ARB  -cont low dose hydralazine  -eventual cardiac catheterization

## 2023-09-28 NOTE — PROGRESS NOTE ADULT - SUBJECTIVE AND OBJECTIVE BOX
Cardiology Progress Note    Interval Events:  off NIV  on NC, with some persistent fevers and tachycardia  SOB is improved      MEDICATIONS:  aspirin enteric coated 81 milliGRAM(s) Oral daily  buMETAnide Injectable 2 milliGRAM(s) IV Push two times a day  clopidogrel Tablet 75 milliGRAM(s) Oral daily  heparin  Infusion. 1100 Unit(s)/Hr IV Continuous <Continuous>  hydrALAZINE Injectable 10 milliGRAM(s) IV Push every 6 hours  cefTRIAXone   IVPB 1000 milliGRAM(s) IV Intermittent every 24 hours  albuterol    0.083% 2.5 milliGRAM(s) Nebulizer every 6 hours PRN  pantoprazole  Injectable 40 milliGRAM(s) IV Push two times a day  atorvastatin 80 milliGRAM(s) Oral at bedtime  dextrose Oral Gel 15 Gram(s) Oral once PRN  insulin glargine Injectable (LANTUS) 10 Unit(s) SubCutaneous once  insulin lispro (ADMELOG) corrective regimen sliding scale   SubCutaneous three times a day before meals  insulin lispro (ADMELOG) corrective regimen sliding scale   SubCutaneous at bedtime  insulin lispro Injectable (ADMELOG) 3 Unit(s) SubCutaneous three times a day before meals  chlorhexidine 2% Cloths 1 Application(s) Topical <User Schedule>  influenza   Vaccine 0.5 milliLiter(s) IntraMuscular once      PHYSICAL EXAM:  T(C): 39.5 (09-28-23 @ 10:30), Max: 39.5 (09-28-23 @ 05:00)  HR: 133 (09-28-23 @ 12:30) (111 - 140)  BP: 130/92 (09-28-23 @ 12:30) (83/72 - 130/95)  RR: 17 (09-28-23 @ 12:30) (11 - 37)  SpO2: 95% (09-28-23 @ 12:30) (89% - 100%)  Wt(kg): --  I&O's Summary    27 Sep 2023 07:01  -  28 Sep 2023 07:00  --------------------------------------------------------  IN: 1241.5 mL / OUT: 3560 mL / NET: -2318.5 mL    28 Sep 2023 07:01  -  28 Sep 2023 12:56  --------------------------------------------------------  IN: 844 mL / OUT: 975 mL / NET: -131 mL    Appearance: nad  HEENT:   NC  Cardiovascular: Normal S1 S2, mildly elevated JVP, no murmurs, no LE edema  Respiratory: mild bibasilar crackles  Psychiatry:Mood & affect appropriate  Gastrointestinal:  soft nt  Skin: No rashes, no ecchymoses, no cyanosis	  Vascular: Peripheral pulses palpable bilaterally    LABS:	 	  CBC Full  -  ( 28 Sep 2023 06:40 )  WBC Count : 12.52 K/uL  Hemoglobin : 13.4 g/dL  Hematocrit : 41.7 %  Platelet Count - Automated : 280 K/uL    09-28  137  |  102  |  35<H>  ----------------------------<  280<H>  3.5   |  25  |  1.91<H>    09-27  138  |  101  |  31<H>  ----------------------------<  289<H>  3.8   |  26  |  1.90<H>    Ca    7.7<L>      28 Sep 2023 06:40  Ca    8.1<L>      27 Sep 2023 15:50  Phos  2.6     09-28  Phos  4.0     09-27  Mg     2.2     09-28  Mg     2.3     09-27    TPro  6.2  /  Alb  2.2<L>  /  TBili  0.7  /  DBili  x   /  AST  55<H>  /  ALT  37  /  AlkPhos  56  09-28  TPro  7.0  /  Alb  2.6<L>  /  TBili  0.5  /  DBili  x   /  AST  101<H>  /  ALT  39  /  AlkPhos  64  09-27      CARDIAC MARKERS:    Troponin I, High Sensitivity Result: 57025.1 ng/L (09-27-23 @ 02:40)  Troponin I, High Sensitivity Result: 32509.0 ng/L (09-26-23 @ 20:27)  Troponin I, High Sensitivity Result: 10759.8 ng/L (09-26-23 @ 13:15)  Troponin I, High Sensitivity Result: 8374.4 ng/L (09-26-23 @ 07:55)  Troponin I, High Sensitivity Result: 6517.4 ng/L (09-26-23 @ 01:30)    TELEMETRY: 	  ST  ECG:  	  RADIOLOGY:  OTHER: 	    PREVIOUS DIAGNOSTIC TESTING:    [ ] Echocardiogram:   [ ] Catheterization:  [ ] Stress Test:

## 2023-09-29 DIAGNOSIS — R50.9 FEVER, UNSPECIFIED: ICD-10-CM

## 2023-09-29 DIAGNOSIS — J18.9 PNEUMONIA, UNSPECIFIED ORGANISM: ICD-10-CM

## 2023-09-29 LAB
ALBUMIN SERPL ELPH-MCNC: 2.2 G/DL — LOW (ref 3.3–5)
ALP SERPL-CCNC: 57 U/L — SIGNIFICANT CHANGE UP (ref 40–120)
ALT FLD-CCNC: 40 U/L — SIGNIFICANT CHANGE UP (ref 12–78)
ANION GAP SERPL CALC-SCNC: 7 MMOL/L — SIGNIFICANT CHANGE UP (ref 5–17)
APTT BLD: 68.8 SEC — HIGH (ref 24.5–35.6)
AST SERPL-CCNC: 48 U/L — HIGH (ref 15–37)
BILIRUB SERPL-MCNC: 0.7 MG/DL — SIGNIFICANT CHANGE UP (ref 0.2–1.2)
BUN SERPL-MCNC: 39 MG/DL — HIGH (ref 7–23)
CALCIUM SERPL-MCNC: 8.2 MG/DL — LOW (ref 8.5–10.1)
CHLORIDE SERPL-SCNC: 101 MMOL/L — SIGNIFICANT CHANGE UP (ref 96–108)
CK SERPL-CCNC: 170 U/L — SIGNIFICANT CHANGE UP (ref 26–308)
CO2 SERPL-SCNC: 28 MMOL/L — SIGNIFICANT CHANGE UP (ref 22–31)
CREAT SERPL-MCNC: 1.8 MG/DL — HIGH (ref 0.5–1.3)
EGFR: 44 ML/MIN/1.73M2 — LOW
GLUCOSE BLDC GLUCOMTR-MCNC: 247 MG/DL — HIGH (ref 70–99)
GLUCOSE BLDC GLUCOMTR-MCNC: 299 MG/DL — HIGH (ref 70–99)
GLUCOSE BLDC GLUCOMTR-MCNC: 303 MG/DL — HIGH (ref 70–99)
GLUCOSE BLDC GLUCOMTR-MCNC: 354 MG/DL — HIGH (ref 70–99)
GLUCOSE BLDC GLUCOMTR-MCNC: 386 MG/DL — HIGH (ref 70–99)
GLUCOSE SERPL-MCNC: 244 MG/DL — HIGH (ref 70–99)
GRAM STN FLD: SIGNIFICANT CHANGE UP
HCT VFR BLD CALC: 44 % — SIGNIFICANT CHANGE UP (ref 39–50)
HGB BLD-MCNC: 13.9 G/DL — SIGNIFICANT CHANGE UP (ref 13–17)
MAGNESIUM SERPL-MCNC: 2.2 MG/DL — SIGNIFICANT CHANGE UP (ref 1.6–2.6)
MCHC RBC-ENTMCNC: 25.1 PG — LOW (ref 27–34)
MCHC RBC-ENTMCNC: 31.6 G/DL — LOW (ref 32–36)
MCV RBC AUTO: 79.4 FL — LOW (ref 80–100)
NRBC # BLD: 0 /100 WBCS — SIGNIFICANT CHANGE UP (ref 0–0)
NT-PROBNP SERPL-SCNC: HIGH PG/ML (ref 0–125)
PHOSPHATE SERPL-MCNC: 3.3 MG/DL — SIGNIFICANT CHANGE UP (ref 2.5–4.5)
PLATELET # BLD AUTO: 293 K/UL — SIGNIFICANT CHANGE UP (ref 150–400)
POTASSIUM SERPL-MCNC: 3.7 MMOL/L — SIGNIFICANT CHANGE UP (ref 3.5–5.3)
POTASSIUM SERPL-SCNC: 3.7 MMOL/L — SIGNIFICANT CHANGE UP (ref 3.5–5.3)
PROCALCITONIN SERPL-MCNC: 0.54 NG/ML — HIGH (ref 0.02–0.1)
PROT SERPL-MCNC: 6.1 GM/DL — SIGNIFICANT CHANGE UP (ref 6–8.3)
RBC # BLD: 5.54 M/UL — SIGNIFICANT CHANGE UP (ref 4.2–5.8)
RBC # FLD: 14.6 % — HIGH (ref 10.3–14.5)
SODIUM SERPL-SCNC: 136 MMOL/L — SIGNIFICANT CHANGE UP (ref 135–145)
SPECIMEN SOURCE: SIGNIFICANT CHANGE UP
TROPONIN I, HIGH SENSITIVITY RESULT: HIGH NG/L
WBC # BLD: 10.85 K/UL — HIGH (ref 3.8–10.5)
WBC # FLD AUTO: 10.85 K/UL — HIGH (ref 3.8–10.5)

## 2023-09-29 PROCEDURE — 99232 SBSQ HOSP IP/OBS MODERATE 35: CPT

## 2023-09-29 PROCEDURE — 99233 SBSQ HOSP IP/OBS HIGH 50: CPT

## 2023-09-29 PROCEDURE — 99223 1ST HOSP IP/OBS HIGH 75: CPT

## 2023-09-29 RX ORDER — ACETAMINOPHEN 500 MG
1000 TABLET ORAL ONCE
Refills: 0 | Status: COMPLETED | OUTPATIENT
Start: 2023-09-29 | End: 2023-09-29

## 2023-09-29 RX ORDER — POTASSIUM CHLORIDE 20 MEQ
40 PACKET (EA) ORAL ONCE
Refills: 0 | Status: COMPLETED | OUTPATIENT
Start: 2023-09-29 | End: 2023-09-29

## 2023-09-29 RX ORDER — KETOROLAC TROMETHAMINE 30 MG/ML
15 SYRINGE (ML) INJECTION ONCE
Refills: 0 | Status: DISCONTINUED | OUTPATIENT
Start: 2023-09-29 | End: 2023-09-29

## 2023-09-29 RX ORDER — ACETAMINOPHEN 500 MG
650 TABLET ORAL EVERY 6 HOURS
Refills: 0 | Status: DISCONTINUED | OUTPATIENT
Start: 2023-09-29 | End: 2023-10-13

## 2023-09-29 RX ADMIN — BUMETANIDE 2 MILLIGRAM(S): 0.25 INJECTION INTRAMUSCULAR; INTRAVENOUS at 05:15

## 2023-09-29 RX ADMIN — Medication 400 MILLIGRAM(S): at 02:21

## 2023-09-29 RX ADMIN — Medication 3 UNIT(S): at 11:47

## 2023-09-29 RX ADMIN — Medication 15 MILLIGRAM(S): at 13:09

## 2023-09-29 RX ADMIN — Medication 40 MILLIEQUIVALENT(S): at 08:20

## 2023-09-29 RX ADMIN — HEPARIN SODIUM 1150 UNIT(S)/HR: 5000 INJECTION INTRAVENOUS; SUBCUTANEOUS at 05:17

## 2023-09-29 RX ADMIN — PANTOPRAZOLE SODIUM 40 MILLIGRAM(S): 20 TABLET, DELAYED RELEASE ORAL at 18:14

## 2023-09-29 RX ADMIN — Medication 650 MILLIGRAM(S): at 07:40

## 2023-09-29 RX ADMIN — Medication 1000 MILLIGRAM(S): at 03:21

## 2023-09-29 RX ADMIN — Medication 3: at 22:28

## 2023-09-29 RX ADMIN — BUMETANIDE 2 MILLIGRAM(S): 0.25 INJECTION INTRAMUSCULAR; INTRAVENOUS at 13:09

## 2023-09-29 RX ADMIN — Medication 3 UNIT(S): at 08:20

## 2023-09-29 RX ADMIN — Medication 3 UNIT(S): at 18:13

## 2023-09-29 RX ADMIN — PANTOPRAZOLE SODIUM 40 MILLIGRAM(S): 20 TABLET, DELAYED RELEASE ORAL at 05:15

## 2023-09-29 RX ADMIN — Medication 81 MILLIGRAM(S): at 11:55

## 2023-09-29 RX ADMIN — HEPARIN SODIUM 1150 UNIT(S)/HR: 5000 INJECTION INTRAVENOUS; SUBCUTANEOUS at 07:15

## 2023-09-29 RX ADMIN — Medication 650 MILLIGRAM(S): at 07:38

## 2023-09-29 RX ADMIN — CLOPIDOGREL BISULFATE 75 MILLIGRAM(S): 75 TABLET, FILM COATED ORAL at 11:55

## 2023-09-29 RX ADMIN — Medication 10 MILLIGRAM(S): at 12:00

## 2023-09-29 RX ADMIN — Medication 15 MILLIGRAM(S): at 13:15

## 2023-09-29 RX ADMIN — INSULIN GLARGINE 10 UNIT(S): 100 INJECTION, SOLUTION SUBCUTANEOUS at 22:25

## 2023-09-29 RX ADMIN — Medication 4: at 08:21

## 2023-09-29 RX ADMIN — Medication 650 MILLIGRAM(S): at 15:56

## 2023-09-29 RX ADMIN — Medication 8: at 11:47

## 2023-09-29 RX ADMIN — CHLORHEXIDINE GLUCONATE 1 APPLICATION(S): 213 SOLUTION TOPICAL at 11:49

## 2023-09-29 RX ADMIN — CEFTRIAXONE 100 MILLIGRAM(S): 500 INJECTION, POWDER, FOR SOLUTION INTRAMUSCULAR; INTRAVENOUS at 07:15

## 2023-09-29 RX ADMIN — ATORVASTATIN CALCIUM 80 MILLIGRAM(S): 80 TABLET, FILM COATED ORAL at 22:27

## 2023-09-29 RX ADMIN — Medication 6: at 15:59

## 2023-09-29 NOTE — DIETITIAN INITIAL EVALUATION ADULT - ORAL INTAKE PTA/DIET HISTORY
Pt seen c his wife @ bedside.  Pt appeared to be forgetful, was able to state date of birth correctly.  Pt/wife did the shopping, wife did the cooking.   Diet PTA: Halal, low sodium, little salt c chicken edouard per wife, CHO controlled for diabetes, likes apples, walnuts, limits eggs to 4 x/week.  Per pt/wife, pt's appetite was good, eats healthy, so his wt. stays down.  Pt/wife without report of altered chew/swallow/food allergies/intolerances, cough noted while pt was eating lunch. Pt seen c his wife @ bedside.  Pt appeared to be forgetful, was able to state date of birth correctly.  Pt/wife did the shopping, wife did the cooking.   Diet PTA: Halal, low sodium, little salt c chicken edouard per wife, CHO controlled for diabetes, likes apples, walnuts, limits eggs to 4 x/week.  Per pt/wife, pt's appetite was good, eats healthy, so his wt. stays down.  Pt/wife without report of altered chew/swallow/food allergies/intolerances, however cough noted while pt was eating lunch when pt seen.

## 2023-09-29 NOTE — DIETITIAN INITIAL EVALUATION ADULT - PERTINENT LABORATORY DATA
09-29    136  |  101  |  39<H>  ----------------------------<  244<H>  3.7   |  28  |  1.80<H>    Ca    8.2<L>      29 Sep 2023 03:55  Phos  3.3     09-29  Mg     2.2     09-29    TPro  6.1  /  Alb  2.2<L>  /  TBili  0.7  /  DBili  x   /  AST  48<H>  /  ALT  40  /  AlkPhos  57  09-29  POCT Blood Glucose.: 303 mg/dL <H>(09-29-23 @ 11:38)  A1C with Estimated Average Glucose Result: 6.9 %<H-good glycemic control> (09-27-23 @ 02:40)  A1C with Estimated Average Glucose Result: 6.3 % (12-20-22 @ 08:16)  09/27, T3 69 ng/dL <L> Thyroid stimulating Hormone 0.258 uU/ml <L>  09-27 Chol 198 LDL -- HDL 66 Trig 90  mg/dL <H>

## 2023-09-29 NOTE — PROGRESS NOTE ADULT - ASSESSMENT
59 y/o M w/mobitz type 2 heart block s/p ICD, DM, HTN, prior CVA admitted for dyspnea and chest pain. Hypoxemia. Likely acute pulmonary edema due to acute HFrEF in setting of acute MI. LAURA likely ATN vs cardiorenal. Enterovirus positive.    - Supplemental O2 as needed goal O2 sat >= 90%  - BiPAP PRN  - Heparin gtt as per cardiology  - ASA/Statin/Plavix, hold BB for now  - Continue diuresis goal net negative 1-2 L daily   - Cath as per cardiology (will need eventually)  - Monitor off abx doubt bacterial infection  - Downgrade to tele

## 2023-09-29 NOTE — DIETITIAN INITIAL EVALUATION ADULT - NS FNS DIET ORDER
Diet, DASH/TLC:   Sodium & Cholesterol Restricted  Consistent Carbohydrate {Evening Snack}  Vladimir (09-27-23 @ 13:02)

## 2023-09-29 NOTE — PROGRESS NOTE ADULT - SUBJECTIVE AND OBJECTIVE BOX
Cardiology Progress Note    Interval Events:  Continues to improve  remains with fevers      MEDICATIONS:  aspirin enteric coated 81 milliGRAM(s) Oral daily  buMETAnide Injectable 2 milliGRAM(s) IV Push two times a day  clopidogrel Tablet 75 milliGRAM(s) Oral daily  hydrALAZINE Injectable 10 milliGRAM(s) IV Push every 6 hours  albuterol    0.083% 2.5 milliGRAM(s) Nebulizer every 6 hours PRN  acetaminophen     Tablet .. 650 milliGRAM(s) Oral every 6 hours PRN  pantoprazole  Injectable 40 milliGRAM(s) IV Push two times a day  atorvastatin 80 milliGRAM(s) Oral at bedtime  dextrose Oral Gel 15 Gram(s) Oral once PRN  insulin glargine Injectable (LANTUS) 10 Unit(s) SubCutaneous at bedtime  insulin lispro (ADMELOG) corrective regimen sliding scale   SubCutaneous three times a day before meals  insulin lispro (ADMELOG) corrective regimen sliding scale   SubCutaneous at bedtime  insulin lispro Injectable (ADMELOG) 3 Unit(s) SubCutaneous three times a day before meals  chlorhexidine 2% Cloths 1 Application(s) Topical <User Schedule>  influenza   Vaccine 0.5 milliLiter(s) IntraMuscular once    PHYSICAL EXAM:  T(C): 38.5 (09-29-23 @ 09:00), Max: 39.3 (09-28-23 @ 15:30)  HR: 112 (09-29-23 @ 11:30) (104 - 136)  BP: 116/86 (09-29-23 @ 11:30) (84/59 - 145/106)  RR: 30 (09-29-23 @ 11:30) (12 - 37)  SpO2: 91% (09-29-23 @ 11:30) (91% - 99%)  Wt(kg): --  I&O's Summary    28 Sep 2023 07:01  -  29 Sep 2023 07:00  --------------------------------------------------------  IN: 1381 mL / OUT: 3025 mL / NET: -1644 mL    29 Sep 2023 07:01  -  29 Sep 2023 12:01  --------------------------------------------------------  IN: 98 mL / OUT: 200 mL / NET: -102 mL    Appearance: nad  HEENT:   NC  Cardiovascular: Normal S1 S2, nl JVP, no murmurs, no LE edema  Respiratory: trace bibasilar crackles  Psychiatry:Mood & affect appropriate  Gastrointestinal:  soft nt  Skin: No rashes, no ecchymoses, no cyanosis	  Vascular: Peripheral pulses palpable bilaterally    LABS:	 	  CBC Full  -  ( 29 Sep 2023 07:00 )  WBC Count : 10.85 K/uL  Hemoglobin : 13.9 g/dL  Hematocrit : 44.0 %  Platelet Count - Automated : 293 K/uL      09-29    136  |  101  |  39<H>  ----------------------------<  244<H>  3.7   |  28  |  1.80<H>  09-28    137  |  102  |  35<H>  ----------------------------<  280<H>  3.5   |  25  |  1.91<H>    Ca    8.2<L>      29 Sep 2023 03:55  Ca    7.7<L>      28 Sep 2023 06:40  Phos  3.3     09-29  Phos  2.6     09-28  Mg     2.2     09-29  Mg     2.2     09-28    TPro  6.1  /  Alb  2.2<L>  /  TBili  0.7  /  DBili  x   /  AST  48<H>  /  ALT  40  /  AlkPhos  57  09-29  TPro  6.2  /  Alb  2.2<L>  /  TBili  0.7  /  DBili  x   /  AST  55<H>  /  ALT  37  /  AlkPhos  56  09-28      proBNP:   Lipid Profile:   HgA1c:   TSH:     CARDIAC MARKERS:  Troponin I, High Sensitivity Result: 69905.3 ng/L (09-29-23 @ 03:55)  Troponin I, High Sensitivity Result: 58128.1 ng/L (09-27-23 @ 02:40)  Troponin I, High Sensitivity Result: 70160.0 ng/L (09-26-23 @ 20:27)  Troponin I, High Sensitivity Result: 58328.8 ng/L (09-26-23 @ 13:15)    TELEMETRY: 	  ST  ECG:  	  RADIOLOGY:  OTHER: 	    PREVIOUS DIAGNOSTIC TESTING:    [ ] Echocardiogram:   [ ] Catheterization:  [ ] Stress Test:

## 2023-09-29 NOTE — DIETITIAN INITIAL EVALUATION ADULT - NS FNS WEIGHT CHANGE REASON
Per wife, pt's weight was 163 LBS in MD's office last month.  As per previous adm RD note, 12/22/22, wt. of 77.1 kg/170 LBS was noted.

## 2023-09-29 NOTE — CHART NOTE - NSCHARTNOTEFT_GEN_A_CORE
MICU DOWN GRADE NOTE    57 yo m pmhx CVA with mild left sided deficits, HTN, DM2, vertigo, HLD, Mobitz II s/pp Medtronic dual chamber ICD (12/21/22) biba from home with complaints of sob/chest pain.  Per patient's wife cp began yesterday however persistent today and with associated worsening sob.  In ED patient tachypneic, tachycardic.  Labs significant for Trop 6791.6, Cr 1.42, bnp and lactate 2.9.  Patient given 2L IVF, lactate worsening, rapidly worsening sob and hypoxia.  Patient seen at bedside, on NRB with spo2 88-92% tachypenic, appears uncomfortable.  Coarse b/l.  Bedside POCUS exam performed, diffuse b lines, ivf plump, difficultly visualizing chambers however overall function appears decreased based off his previous tte.  Patient ordered for Bipap and lasix 80mg.  Stat TTE ordered.  Admitted to ICU for further management.    Patient found to be entero/rhino positive. TTE showing EF 20%, with severely decreased global left ventricular systolic function, and multiple left ventricular regional wall motion abnormalities exist. Cardiology following, concern at that time for myocarditis versus endocarditis versus MI. Patient on heparin, and now transitioned to DAPT. Also treated with a course of ceftriaxone with minimal improvement in fever. Sputum + on 9/29 gram+ cocci in pairs, gram -rods and gram - coccobacilli. Patient now placed on Zosyn for pseudomonal coverage. Infectious disease following patient.     As per cards, pending infectious workup and treatment will likely need transfer for  cardiac catheterization, once infectious symptoms resolve.             REVIEW OF SYSTEMS:  CONSTITUTIONAL: +fever, chills  HEENT:  No blurry vision No sinus or throat pain  RESPIRATORY: +cough, SOB  CARDIOVASCULAR: No chest pain,   GASTROINTESTINAL: No abdominal pain. No nausea, vomiting, or diarrhea  GENITOURINARY: No dysuria  NEUROLOGICAL: No HA, No focal weakness  SKIN: No itching, burning, rashes, or lesions   MUSCULOSKELETAL: No joint pain or swelling; No muscle, back, or extremity pain    MEDICATIONS:  acetaminophen     Tablet .. 650 milliGRAM(s) Oral every 6 hours PRN  albuterol    0.083% 2.5 milliGRAM(s) Nebulizer every 6 hours PRN  aspirin enteric coated 81 milliGRAM(s) Oral daily  atorvastatin 80 milliGRAM(s) Oral at bedtime  buMETAnide Injectable 2 milliGRAM(s) IV Push two times a day  chlorhexidine 2% Cloths 1 Application(s) Topical <User Schedule>  clopidogrel Tablet 75 milliGRAM(s) Oral daily  dextrose Oral Gel 15 Gram(s) Oral once PRN  hydrALAZINE Injectable 10 milliGRAM(s) IV Push every 6 hours  influenza   Vaccine 0.5 milliLiter(s) IntraMuscular once  insulin glargine Injectable (LANTUS) 10 Unit(s) SubCutaneous at bedtime  insulin lispro (ADMELOG) corrective regimen sliding scale   SubCutaneous three times a day before meals  insulin lispro (ADMELOG) corrective regimen sliding scale   SubCutaneous at bedtime  insulin lispro Injectable (ADMELOG) 3 Unit(s) SubCutaneous three times a day before meals  pantoprazole  Injectable 40 milliGRAM(s) IV Push two times a day      T(C): 39.4 (09-29-23 @ 12:45), Max: 39.4 (09-29-23 @ 12:45)  HR: 133 (09-29-23 @ 13:00) (104 - 136)  BP: 118/78 (09-29-23 @ 13:00) (84/59 - 145/106)  RR: 29 (09-29-23 @ 13:00) (12 - 37)  SpO2: 97% (09-29-23 @ 13:00) (91% - 99%)  Wt(kg): --Vital Signs Last 24 Hrs  T(C): 39.4 (29 Sep 2023 12:45), Max: 39.4 (29 Sep 2023 12:45)  T(F): 103 (29 Sep 2023 12:45), Max: 103 (29 Sep 2023 12:45)  HR: 133 (29 Sep 2023 13:00) (104 - 136)  BP: 118/78 (29 Sep 2023 13:00) (84/59 - 145/106)  BP(mean): 90 (29 Sep 2023 13:00) (67 - 117)  RR: 29 (29 Sep 2023 13:00) (12 - 37)  SpO2: 97% (29 Sep 2023 13:00) (91% - 99%)    Parameters below as of 29 Sep 2023 08:00  Patient On (Oxygen Delivery Method): nasal cannula  O2 Flow (L/min): 4      PHYSICAL EXAM:  GENERAL: NAD, well-groomed, well-developed  HEAD:  Atraumatic, Normocephalic  EYES: EOMI, PERRLA, conjunctiva and sclera clear  ENMT:  Moist mucous membranes  NECK: Supple, No JVD,  CHEST/LUNG: Clear to auscultation bilaterally; No rales, rhonchi, wheezing, or rubs  HEART: Regular rate and rhythm; No murmurs, rubs, or gallops  ABDOMEN: Soft, Nontender, Nondistended; Bowel sounds present  NEURO: Alert & Oriented X3  EXTREMITIES: No LE edema, no calf tenderness  LYMPH: No lymphadenopathy noted  SKIN: No rashes or lesions    Consultant(s) Notes Reviewed:  [x ] YES  [ ] NO  Care Discussed with Consultants/Other Providers [ x] YES  [ ] NO    LABS:                        13.9   10.85 )-----------( 293      ( 29 Sep 2023 07:00 )             44.0     09-29    136  |  101  |  39<H>  ----------------------------<  244<H>  3.7   |  28  |  1.80<H>    Ca    8.2<L>      29 Sep 2023 03:55  Phos  3.3     09-29  Mg     2.2     09-29    TPro  6.1  /  Alb  2.2<L>  /  TBili  0.7  /  DBili  x   /  AST  48<H>  /  ALT  40  /  AlkPhos  57  09-29    PTT - ( 29 Sep 2023 03:55 )  PTT:68.8 sec  Urinalysis Basic - ( 29 Sep 2023 03:55 )    Color: x / Appearance: x / SG: x / pH: x  Gluc: 244 mg/dL / Ketone: x  / Bili: x / Urobili: x   Blood: x / Protein: x / Nitrite: x   Leuk Esterase: x / RBC: x / WBC x   Sq Epi: x / Non Sq Epi: x / Bacteria: x      CAPILLARY BLOOD GLUCOSE      POCT Blood Glucose.: 303 mg/dL (29 Sep 2023 11:38)  POCT Blood Glucose.: 247 mg/dL (29 Sep 2023 08:07)  POCT Blood Glucose.: 211 mg/dL (28 Sep 2023 21:24)  POCT Blood Glucose.: 283 mg/dL (28 Sep 2023 15:35)        Urinalysis Basic - ( 29 Sep 2023 03:55 )    Color: x / Appearance: x / SG: x / pH: x  Gluc: 244 mg/dL / Ketone: x  / Bili: x / Urobili: x   Blood: x / Protein: x / Nitrite: x   Leuk Esterase: x / RBC: x / WBC x   Sq Epi: x / Non Sq Epi: x / Bacteria: x        Culture - Sputum (collected 09-28-23 @ 18:18)  Source: .Sputum Sputum  Gram Stain (09-29-23 @ 07:36):    Few polymorphonuclear leukocytes per low power field    Rare Squamous epithelial cells per low power field    Few Gram positive cocci in pairs per oil power field    Few Gram Negative Rods per oil power field    Rare Gram Negative Coccobacilli per oil power field      RADIOLOGY & ADDITIONAL TESTS:    Imaging Personally Reviewed:  [x ] YES  [ ] NO    To Follow up:   Infectious disease workup  Cardiology for next steps MICU DOWN GRADE NOTE    57 yo m pmhx CVA with mild left sided deficits, HTN, DM2, vertigo, HLD, Mobitz II s/pp Medtronic dual chamber ICD (12/21/22) biba from home with complaints of sob/chest pain.  Per patient's wife cp began yesterday however persistent today and with associated worsening sob.  In ED patient tachypneic, tachycardic.  Labs significant for Trop 6791.6, Cr 1.42, bnp and lactate 2.9.  Patient given 2L IVF, lactate worsening, rapidly worsening sob and hypoxia.  Patient seen at bedside, on NRB with spo2 88-92% tachypenic, appears uncomfortable.  Coarse b/l.  Bedside POCUS exam performed, diffuse b lines, ivf plump, difficultly visualizing chambers however overall function appears decreased based off his previous tte.  Patient ordered for Bipap and lasix 80mg.  Stat TTE ordered.  Admitted to ICU for further management.    Patient found to be entero/rhino positive. TTE showing EF 20%, with severely decreased global left ventricular systolic function, and multiple left ventricular regional wall motion abnormalities exist. Cardiology following, concern at that time for myocarditis versus endocarditis versus MI. Patient on heparin, and now transitioned to DAPT. Also treated with a course of ceftriaxone with minimal improvement in fever. Sputum + on 9/29 gram+ cocci in pairs, gram -rods and gram - coccobacilli. Patient now placed on Zosyn for pseudomonal coverage. Infectious disease following patient.     As per cards, pending infectious workup and treatment will likely need transfer for  cardiac catheterization, once infectious symptoms resolve.     Patient hemodynamically stable, OK to downgrade from ICU care.         REVIEW OF SYSTEMS:  CONSTITUTIONAL: +fever, chills  HEENT:  No blurry vision No sinus or throat pain  RESPIRATORY: +cough, SOB  CARDIOVASCULAR: No chest pain,   GASTROINTESTINAL: No abdominal pain. No nausea, vomiting, or diarrhea  GENITOURINARY: No dysuria  NEUROLOGICAL: No HA, No focal weakness  SKIN: No itching, burning, rashes, or lesions   MUSCULOSKELETAL: No joint pain or swelling; No muscle, back, or extremity pain    MEDICATIONS:  acetaminophen     Tablet .. 650 milliGRAM(s) Oral every 6 hours PRN  albuterol    0.083% 2.5 milliGRAM(s) Nebulizer every 6 hours PRN  aspirin enteric coated 81 milliGRAM(s) Oral daily  atorvastatin 80 milliGRAM(s) Oral at bedtime  buMETAnide Injectable 2 milliGRAM(s) IV Push two times a day  chlorhexidine 2% Cloths 1 Application(s) Topical <User Schedule>  clopidogrel Tablet 75 milliGRAM(s) Oral daily  dextrose Oral Gel 15 Gram(s) Oral once PRN  hydrALAZINE Injectable 10 milliGRAM(s) IV Push every 6 hours  influenza   Vaccine 0.5 milliLiter(s) IntraMuscular once  insulin glargine Injectable (LANTUS) 10 Unit(s) SubCutaneous at bedtime  insulin lispro (ADMELOG) corrective regimen sliding scale   SubCutaneous three times a day before meals  insulin lispro (ADMELOG) corrective regimen sliding scale   SubCutaneous at bedtime  insulin lispro Injectable (ADMELOG) 3 Unit(s) SubCutaneous three times a day before meals  pantoprazole  Injectable 40 milliGRAM(s) IV Push two times a day      T(C): 39.4 (09-29-23 @ 12:45), Max: 39.4 (09-29-23 @ 12:45)  HR: 133 (09-29-23 @ 13:00) (104 - 136)  BP: 118/78 (09-29-23 @ 13:00) (84/59 - 145/106)  RR: 29 (09-29-23 @ 13:00) (12 - 37)  SpO2: 97% (09-29-23 @ 13:00) (91% - 99%)  Wt(kg): --Vital Signs Last 24 Hrs  T(C): 39.4 (29 Sep 2023 12:45), Max: 39.4 (29 Sep 2023 12:45)  T(F): 103 (29 Sep 2023 12:45), Max: 103 (29 Sep 2023 12:45)  HR: 133 (29 Sep 2023 13:00) (104 - 136)  BP: 118/78 (29 Sep 2023 13:00) (84/59 - 145/106)  BP(mean): 90 (29 Sep 2023 13:00) (67 - 117)  RR: 29 (29 Sep 2023 13:00) (12 - 37)  SpO2: 97% (29 Sep 2023 13:00) (91% - 99%)    Parameters below as of 29 Sep 2023 08:00  Patient On (Oxygen Delivery Method): nasal cannula  O2 Flow (L/min): 4      PHYSICAL EXAM:  GENERAL: NAD, well-groomed, well-developed  HEAD:  Atraumatic, Normocephalic  EYES: EOMI, PERRLA, conjunctiva and sclera clear  ENMT:  Moist mucous membranes  NECK: Supple, No JVD,  CHEST/LUNG: Clear to auscultation bilaterally; No rales, rhonchi, wheezing, or rubs  HEART: Regular rate and rhythm; No murmurs, rubs, or gallops  ABDOMEN: Soft, Nontender, Nondistended; Bowel sounds present  NEURO: Alert & Oriented X3  EXTREMITIES: No LE edema, no calf tenderness  LYMPH: No lymphadenopathy noted  SKIN: No rashes or lesions    Consultant(s) Notes Reviewed:  [x ] YES  [ ] NO  Care Discussed with Consultants/Other Providers [ x] YES  [ ] NO    LABS:                        13.9   10.85 )-----------( 293      ( 29 Sep 2023 07:00 )             44.0     09-29    136  |  101  |  39<H>  ----------------------------<  244<H>  3.7   |  28  |  1.80<H>    Ca    8.2<L>      29 Sep 2023 03:55  Phos  3.3     09-29  Mg     2.2     09-29    TPro  6.1  /  Alb  2.2<L>  /  TBili  0.7  /  DBili  x   /  AST  48<H>  /  ALT  40  /  AlkPhos  57  09-29    PTT - ( 29 Sep 2023 03:55 )  PTT:68.8 sec  Urinalysis Basic - ( 29 Sep 2023 03:55 )    Color: x / Appearance: x / SG: x / pH: x  Gluc: 244 mg/dL / Ketone: x  / Bili: x / Urobili: x   Blood: x / Protein: x / Nitrite: x   Leuk Esterase: x / RBC: x / WBC x   Sq Epi: x / Non Sq Epi: x / Bacteria: x      CAPILLARY BLOOD GLUCOSE      POCT Blood Glucose.: 303 mg/dL (29 Sep 2023 11:38)  POCT Blood Glucose.: 247 mg/dL (29 Sep 2023 08:07)  POCT Blood Glucose.: 211 mg/dL (28 Sep 2023 21:24)  POCT Blood Glucose.: 283 mg/dL (28 Sep 2023 15:35)        Urinalysis Basic - ( 29 Sep 2023 03:55 )    Color: x / Appearance: x / SG: x / pH: x  Gluc: 244 mg/dL / Ketone: x  / Bili: x / Urobili: x   Blood: x / Protein: x / Nitrite: x   Leuk Esterase: x / RBC: x / WBC x   Sq Epi: x / Non Sq Epi: x / Bacteria: x        Culture - Sputum (collected 09-28-23 @ 18:18)  Source: .Sputum Sputum  Gram Stain (09-29-23 @ 07:36):    Few polymorphonuclear leukocytes per low power field    Rare Squamous epithelial cells per low power field    Few Gram positive cocci in pairs per oil power field    Few Gram Negative Rods per oil power field    Rare Gram Negative Coccobacilli per oil power field      RADIOLOGY & ADDITIONAL TESTS:    Imaging Personally Reviewed:  [x ] YES  [ ] NO      To Follow up:   Infectious disease workup  Cardiology for next steps    Plan of Care discussed with ICU attending Dr. Ornelas, and verbal sign out discussed with Dr. Rosa

## 2023-09-29 NOTE — DIETITIAN INITIAL EVALUATION ADULT - PERTINENT MEDS FT
MEDICATIONS  (STANDING):  aspirin enteric coated 81 milliGRAM(s) Oral daily  atorvastatin 80 milliGRAM(s) Oral at bedtime  buMETAnide Injectable 2 milliGRAM(s) IV Push two times a day  chlorhexidine 2% Cloths 1 Application(s) Topical <User Schedule>  clopidogrel Tablet 75 milliGRAM(s) Oral daily  hydrALAZINE Injectable 10 milliGRAM(s) IV Push every 6 hours  influenza   Vaccine 0.5 milliLiter(s) IntraMuscular once  insulin glargine Injectable (LANTUS) 10 Unit(s) SubCutaneous at bedtime  insulin lispro (ADMELOG) corrective regimen sliding scale   SubCutaneous at bedtime  insulin lispro (ADMELOG) corrective regimen sliding scale   SubCutaneous three times a day before meals  insulin lispro Injectable (ADMELOG) 3 Unit(s) SubCutaneous three times a day before meals  pantoprazole  Injectable 40 milliGRAM(s) IV Push two times a day    MEDICATIONS  (PRN):  acetaminophen     Tablet .. 650 milliGRAM(s) Oral every 6 hours PRN Temp greater or equal to 38C (100.4F), Mild Pain (1 - 3)  albuterol    0.083% 2.5 milliGRAM(s) Nebulizer every 6 hours PRN Shortness of Breath and/or Wheezing  dextrose Oral Gel 15 Gram(s) Oral once PRN Blood Glucose LESS THAN 70 milliGRAM(s)/deciliter

## 2023-09-29 NOTE — PROGRESS NOTE ADULT - ASSESSMENT
58M HTN, HLD, DM, CVA with residual L-sided deficits, Mobitz II s/p Medtronic AICD who presented with episodes of chest pain explained as heartburn that progressed to worsening SOB. Pt found to have LAURA, lactate, and hypoxic that worsened with IVF, subsequently placed on bilevel. Currently on NC    Acute CHF related to possible missed MI vs. stress myopathy  TTE without gross evidence of mechanical complication  given degree of hypoxia suspect that there is an additional contributor i.e. viral illness  remains without chest pain  -hep gtt x total 72 hours, DAPT for ACS  -cont diuresis though appears to be nearing euvolemia  -trial metoprolol tartrate 12.5mg BID  -if tolerates low dose bb start losartan 25mg daily  -eventual ARB/ARNi as BP improves  -eventual cardiac catheterization

## 2023-09-29 NOTE — PROGRESS NOTE ADULT - SUBJECTIVE AND OBJECTIVE BOX
HPI:  59 yo m pmhx CVA with mild left sided deficits, HTN, DM2, vertigo, HLD, Mobitz II s/pp Medtronic dual chamber ICD (12/21/22) biba from home with complaints of sob/chest pain.  Per patient's wife cp began yesterday however persistent today and with associated worsening sob.  In ED patient tachypneic, tachycardic.  Labs significant for Trop 6791.6, Cr 1.42, bnp and lactate 2.9.  Patient given 2L IVF, lactate worsening, rapidly worsening sob and hypoxia.  Patient seen at bedside, on NRB with spo2 88-92% tachypenic, appears uncomfortable.  Coarse b/l.  Bedside POCUS exam performed, diffuse b lines, ivf plump, difficultly visualizing chambers however overall function appears decreased based off his previous tte.  Patient ordered for Bipap and lasix 80mg.  Stat TTE ordered.  Admit to ICU.       (26 Sep 2023 06:34)      24 hr events: No acute events. Remains febrile.     ## ROS:  See above. ROS otherwise negative.     ## Vitals  ICU Vital Signs Last 24 Hrs  T(C): 38.7 (29 Sep 2023 07:33), Max: 39.5 (28 Sep 2023 10:30)  T(F): 101.6 (29 Sep 2023 07:33), Max: 103.1 (28 Sep 2023 10:30)  HR: 133 (29 Sep 2023 07:30) (104 - 139)  BP: 134/95 (29 Sep 2023 07:30) (84/59 - 134/95)  BP(mean): 107 (29 Sep 2023 07:30) (67 - 107)  ABP: --  ABP(mean): --  RR: 16 (29 Sep 2023 07:30) (12 - 32)  SpO2: 92% (29 Sep 2023 07:30) (91% - 99%)    O2 Parameters below as of 29 Sep 2023 07:15  Patient On (Oxygen Delivery Method): nasal cannula  O2 Flow (L/min): 3          ## Physical Exam:  Gen:  HEENT:  Resp:  CV:  Abd:  Ext:  Neuro:    ## Vent Data      ## Labs:  Chem:  09-29    136  |  101  |  39<H>  ----------------------------<  244<H>  3.7   |  28  |  1.80<H>    Ca    8.2<L>      29 Sep 2023 03:55  Phos  3.3     09-29  Mg     2.2     09-29    TPro  6.1  /  Alb  2.2<L>  /  TBili  0.7  /  DBili  x   /  AST  48<H>  /  ALT  40  /  AlkPhos  57  09-29    LIVER FUNCTIONS - ( 29 Sep 2023 03:55 )  Alb: 2.2 g/dL / Pro: 6.1 gm/dL / ALK PHOS: 57 U/L / ALT: 40 U/L / AST: 48 U/L / GGT: x           CBC:                        13.9   10.85 )-----------( 293      ( 29 Sep 2023 07:00 )             44.0     Coags:  PTT - ( 29 Sep 2023 03:55 )  PTT:68.8 sec    Urinalysis Basic - ( 29 Sep 2023 03:55 )    Color: x / Appearance: x / SG: x / pH: x  Gluc: 244 mg/dL / Ketone: x  / Bili: x / Urobili: x   Blood: x / Protein: x / Nitrite: x   Leuk Esterase: x / RBC: x / WBC x   Sq Epi: x / Non Sq Epi: x / Bacteria: x        ## Cardiac  CARDIAC MARKERS ( 29 Sep 2023 03:55 )  x     / x     / 170 U/L / x     / x            ## Blood Gas      #I/Os  I&O's Detail    28 Sep 2023 07:01  -  29 Sep 2023 07:00  --------------------------------------------------------  IN:    Bumetanide: 5 mL    Heparin Infusion: 276 mL    IV PiggyBack: 400 mL    Oral Fluid: 650 mL  Total IN: 1331 mL    OUT:    Indwelling Catheter - Urethral (mL): 3025 mL  Total OUT: 3025 mL    Total NET: -1694 mL          ## Imaging:    ## Medications:  MEDICATIONS  (STANDING):  aspirin enteric coated 81 milliGRAM(s) Oral daily  atorvastatin 80 milliGRAM(s) Oral at bedtime  buMETAnide Injectable 2 milliGRAM(s) IV Push two times a day  cefTRIAXone   IVPB 1000 milliGRAM(s) IV Intermittent every 24 hours  chlorhexidine 2% Cloths 1 Application(s) Topical <User Schedule>  clopidogrel Tablet 75 milliGRAM(s) Oral daily  heparin  Infusion. 1100 Unit(s)/Hr (11 mL/Hr) IV Continuous <Continuous>  hydrALAZINE Injectable 10 milliGRAM(s) IV Push every 6 hours  influenza   Vaccine 0.5 milliLiter(s) IntraMuscular once  insulin glargine Injectable (LANTUS) 10 Unit(s) SubCutaneous at bedtime  insulin lispro (ADMELOG) corrective regimen sliding scale   SubCutaneous three times a day before meals  insulin lispro (ADMELOG) corrective regimen sliding scale   SubCutaneous at bedtime  insulin lispro Injectable (ADMELOG) 3 Unit(s) SubCutaneous three times a day before meals  pantoprazole  Injectable 40 milliGRAM(s) IV Push two times a day    MEDICATIONS  (PRN):  acetaminophen     Tablet .. 650 milliGRAM(s) Oral every 6 hours PRN Temp greater or equal to 38C (100.4F), Mild Pain (1 - 3)  albuterol    0.083% 2.5 milliGRAM(s) Nebulizer every 6 hours PRN Shortness of Breath and/or Wheezing  dextrose Oral Gel 15 Gram(s) Oral once PRN Blood Glucose LESS THAN 70 milliGRAM(s)/deciliter       HPI:  57 yo m pmhx CVA with mild left sided deficits, HTN, DM2, vertigo, HLD, Mobitz II s/pp Medtronic dual chamber ICD (12/21/22) biba from home with complaints of sob/chest pain.  Per patient's wife cp began yesterday however persistent today and with associated worsening sob.  In ED patient tachypneic, tachycardic.  Labs significant for Trop 6791.6, Cr 1.42, bnp and lactate 2.9.  Patient given 2L IVF, lactate worsening, rapidly worsening sob and hypoxia.  Patient seen at bedside, on NRB with spo2 88-92% tachypenic, appears uncomfortable.  Coarse b/l.  Bedside POCUS exam performed, diffuse b lines, ivf plump, difficultly visualizing chambers however overall function appears decreased based off his previous tte.  Patient ordered for Bipap and lasix 80mg.  Stat TTE ordered.  Admit to ICU.       (26 Sep 2023 06:34)      24 hr events: No acute events. Remains febrile. Feels ok. No chest pain or dyspena. Feels cold. Continues to have fevers. No nausea, emesis, or diarrhea.    ## ROS:  See above. ROS otherwise negative.     ## Vitals  ICU Vital Signs Last 24 Hrs  T(C): 38.7 (29 Sep 2023 07:33), Max: 39.5 (28 Sep 2023 10:30)  T(F): 101.6 (29 Sep 2023 07:33), Max: 103.1 (28 Sep 2023 10:30)  HR: 133 (29 Sep 2023 07:30) (104 - 139)  BP: 134/95 (29 Sep 2023 07:30) (84/59 - 134/95)  BP(mean): 107 (29 Sep 2023 07:30) (67 - 107)  ABP: --  ABP(mean): --  RR: 16 (29 Sep 2023 07:30) (12 - 32)  SpO2: 92% (29 Sep 2023 07:30) (91% - 99%)    O2 Parameters below as of 29 Sep 2023 07:15  Patient On (Oxygen Delivery Method): nasal cannula  O2 Flow (L/min): 3          ## Physical Exam:  Gen: Adult male lying comfortably in bed, NAD  HEENT: NC/AT sclerae anicteric  Resp: No increased WOB, bilateral crackles  CV: S1, S2  Abd: Soft, + BS  Ext: WWP, + edema  Neuro: Awake, alert, follows commands    ## Vent Data      ## Labs:  Chem:  09-29    136  |  101  |  39<H>  ----------------------------<  244<H>  3.7   |  28  |  1.80<H>    Ca    8.2<L>      29 Sep 2023 03:55  Phos  3.3     09-29  Mg     2.2     09-29    TPro  6.1  /  Alb  2.2<L>  /  TBili  0.7  /  DBili  x   /  AST  48<H>  /  ALT  40  /  AlkPhos  57  09-29    LIVER FUNCTIONS - ( 29 Sep 2023 03:55 )  Alb: 2.2 g/dL / Pro: 6.1 gm/dL / ALK PHOS: 57 U/L / ALT: 40 U/L / AST: 48 U/L / GGT: x           CBC:                        13.9   10.85 )-----------( 293      ( 29 Sep 2023 07:00 )             44.0     Coags:  PTT - ( 29 Sep 2023 03:55 )  PTT:68.8 sec    Urinalysis Basic - ( 29 Sep 2023 03:55 )    Color: x / Appearance: x / SG: x / pH: x  Gluc: 244 mg/dL / Ketone: x  / Bili: x / Urobili: x   Blood: x / Protein: x / Nitrite: x   Leuk Esterase: x / RBC: x / WBC x   Sq Epi: x / Non Sq Epi: x / Bacteria: x        ## Cardiac  CARDIAC MARKERS ( 29 Sep 2023 03:55 )  x     / x     / 170 U/L / x     / x            ## Blood Gas      #I/Os  I&O's Detail    28 Sep 2023 07:01  -  29 Sep 2023 07:00  --------------------------------------------------------  IN:    Bumetanide: 5 mL    Heparin Infusion: 276 mL    IV PiggyBack: 400 mL    Oral Fluid: 650 mL  Total IN: 1331 mL    OUT:    Indwelling Catheter - Urethral (mL): 3025 mL  Total OUT: 3025 mL    Total NET: -1694 mL          ## Imaging:    ## Medications:  MEDICATIONS  (STANDING):  aspirin enteric coated 81 milliGRAM(s) Oral daily  atorvastatin 80 milliGRAM(s) Oral at bedtime  buMETAnide Injectable 2 milliGRAM(s) IV Push two times a day  cefTRIAXone   IVPB 1000 milliGRAM(s) IV Intermittent every 24 hours  chlorhexidine 2% Cloths 1 Application(s) Topical <User Schedule>  clopidogrel Tablet 75 milliGRAM(s) Oral daily  heparin  Infusion. 1100 Unit(s)/Hr (11 mL/Hr) IV Continuous <Continuous>  hydrALAZINE Injectable 10 milliGRAM(s) IV Push every 6 hours  influenza   Vaccine 0.5 milliLiter(s) IntraMuscular once  insulin glargine Injectable (LANTUS) 10 Unit(s) SubCutaneous at bedtime  insulin lispro (ADMELOG) corrective regimen sliding scale   SubCutaneous three times a day before meals  insulin lispro (ADMELOG) corrective regimen sliding scale   SubCutaneous at bedtime  insulin lispro Injectable (ADMELOG) 3 Unit(s) SubCutaneous three times a day before meals  pantoprazole  Injectable 40 milliGRAM(s) IV Push two times a day    MEDICATIONS  (PRN):  acetaminophen     Tablet .. 650 milliGRAM(s) Oral every 6 hours PRN Temp greater or equal to 38C (100.4F), Mild Pain (1 - 3)  albuterol    0.083% 2.5 milliGRAM(s) Nebulizer every 6 hours PRN Shortness of Breath and/or Wheezing  dextrose Oral Gel 15 Gram(s) Oral once PRN Blood Glucose LESS THAN 70 milliGRAM(s)/deciliter

## 2023-09-29 NOTE — DIETITIAN NUTRITION RISK NOTIFICATION - TREATMENT: THE FOLLOWING DIET HAS BEEN RECOMMENDED
Diet, DASH/TLC:   Sodium & Cholesterol Restricted  Consistent Carbohydrate {Evening Snack}  Vladimir (09-27-23 @ 13:02) [Active]

## 2023-09-29 NOTE — DIETITIAN INITIAL EVALUATION ADULT - OTHER INFO
Pt was on metformin and glipizide for DM, pt did not regularly self monitor blood glucose due to good glycemic control PTA.  Problem diagnosis include CHF exacerbation, NSTEMI, pneumonia , entero/rhinovirus, LAURA.  Pt/wife provided c education for low sodium, heart healthy, consistent CHO intake, daily wt. monitoring guideline, fluid limitations, encouraged adequate protein-energy intake.

## 2023-09-29 NOTE — DIETITIAN INITIAL EVALUATION ADULT - ADD RECOMMEND
Endocrine consult  Endocrine consult   monitor for altered swallow, swallow evaluation as indicated Endocrine consult   monitor for altered swallow, swallow evaluation as indicated, aspiration precautions

## 2023-09-30 LAB
ALBUMIN SERPL ELPH-MCNC: 2.4 G/DL — LOW (ref 3.3–5)
ALP SERPL-CCNC: 58 U/L — SIGNIFICANT CHANGE UP (ref 40–120)
ALT FLD-CCNC: 45 U/L — SIGNIFICANT CHANGE UP (ref 12–78)
ANION GAP SERPL CALC-SCNC: 8 MMOL/L — SIGNIFICANT CHANGE UP (ref 5–17)
APPEARANCE UR: CLEAR — SIGNIFICANT CHANGE UP
APTT BLD: 31.6 SEC — SIGNIFICANT CHANGE UP (ref 24.5–35.6)
AST SERPL-CCNC: 44 U/L — HIGH (ref 15–37)
BACTERIA # UR AUTO: ABNORMAL
BASOPHILS # BLD AUTO: 0.02 K/UL — SIGNIFICANT CHANGE UP (ref 0–0.2)
BASOPHILS NFR BLD AUTO: 0.2 % — SIGNIFICANT CHANGE UP (ref 0–2)
BILIRUB SERPL-MCNC: 0.7 MG/DL — SIGNIFICANT CHANGE UP (ref 0.2–1.2)
BILIRUB UR-MCNC: NEGATIVE — SIGNIFICANT CHANGE UP
BUN SERPL-MCNC: 36 MG/DL — HIGH (ref 7–23)
CALCIUM SERPL-MCNC: 8.5 MG/DL — SIGNIFICANT CHANGE UP (ref 8.5–10.1)
CHLORIDE SERPL-SCNC: 101 MMOL/L — SIGNIFICANT CHANGE UP (ref 96–108)
CO2 SERPL-SCNC: 25 MMOL/L — SIGNIFICANT CHANGE UP (ref 22–31)
COLOR SPEC: YELLOW — SIGNIFICANT CHANGE UP
CREAT SERPL-MCNC: 1.84 MG/DL — HIGH (ref 0.5–1.3)
DIFF PNL FLD: ABNORMAL
EGFR: 42 ML/MIN/1.73M2 — LOW
EOSINOPHIL # BLD AUTO: 0.01 K/UL — SIGNIFICANT CHANGE UP (ref 0–0.5)
EOSINOPHIL NFR BLD AUTO: 0.1 % — SIGNIFICANT CHANGE UP (ref 0–6)
EPI CELLS # UR: SIGNIFICANT CHANGE UP
GLUCOSE BLDC GLUCOMTR-MCNC: 158 MG/DL — HIGH (ref 70–99)
GLUCOSE BLDC GLUCOMTR-MCNC: 217 MG/DL — HIGH (ref 70–99)
GLUCOSE BLDC GLUCOMTR-MCNC: 358 MG/DL — HIGH (ref 70–99)
GLUCOSE BLDC GLUCOMTR-MCNC: 411 MG/DL — HIGH (ref 70–99)
GLUCOSE SERPL-MCNC: 288 MG/DL — HIGH (ref 70–99)
GLUCOSE UR QL: 1000 MG/DL
HCT VFR BLD CALC: 46.7 % — SIGNIFICANT CHANGE UP (ref 39–50)
HGB BLD-MCNC: 14.8 G/DL — SIGNIFICANT CHANGE UP (ref 13–17)
IMM GRANULOCYTES NFR BLD AUTO: 0.5 % — SIGNIFICANT CHANGE UP (ref 0–0.9)
KETONES UR-MCNC: NEGATIVE — SIGNIFICANT CHANGE UP
LACTATE SERPL-SCNC: 2.4 MMOL/L — HIGH (ref 0.7–2)
LEUKOCYTE ESTERASE UR-ACNC: NEGATIVE — SIGNIFICANT CHANGE UP
LYMPHOCYTES # BLD AUTO: 1.14 K/UL — SIGNIFICANT CHANGE UP (ref 1–3.3)
LYMPHOCYTES # BLD AUTO: 10.4 % — LOW (ref 13–44)
MAGNESIUM SERPL-MCNC: 2.4 MG/DL — SIGNIFICANT CHANGE UP (ref 1.6–2.6)
MCHC RBC-ENTMCNC: 25.3 PG — LOW (ref 27–34)
MCHC RBC-ENTMCNC: 31.7 G/DL — LOW (ref 32–36)
MCV RBC AUTO: 79.8 FL — LOW (ref 80–100)
MONOCYTES # BLD AUTO: 0.98 K/UL — HIGH (ref 0–0.9)
MONOCYTES NFR BLD AUTO: 8.9 % — SIGNIFICANT CHANGE UP (ref 2–14)
NEUTROPHILS # BLD AUTO: 8.81 K/UL — HIGH (ref 1.8–7.4)
NEUTROPHILS NFR BLD AUTO: 79.9 % — HIGH (ref 43–77)
NITRITE UR-MCNC: NEGATIVE — SIGNIFICANT CHANGE UP
NRBC # BLD: 0 /100 WBCS — SIGNIFICANT CHANGE UP (ref 0–0)
NT-PROBNP SERPL-SCNC: HIGH PG/ML (ref 0–125)
PH UR: 5 — SIGNIFICANT CHANGE UP (ref 5–8)
PHOSPHATE SERPL-MCNC: 3.5 MG/DL — SIGNIFICANT CHANGE UP (ref 2.5–4.5)
PLATELET # BLD AUTO: 322 K/UL — SIGNIFICANT CHANGE UP (ref 150–400)
POTASSIUM SERPL-MCNC: 4.3 MMOL/L — SIGNIFICANT CHANGE UP (ref 3.5–5.3)
POTASSIUM SERPL-SCNC: 4.3 MMOL/L — SIGNIFICANT CHANGE UP (ref 3.5–5.3)
PROT SERPL-MCNC: 6.5 GM/DL — SIGNIFICANT CHANGE UP (ref 6–8.3)
PROT UR-MCNC: 30 MG/DL
RBC # BLD: 5.85 M/UL — HIGH (ref 4.2–5.8)
RBC # FLD: 14.2 % — SIGNIFICANT CHANGE UP (ref 10.3–14.5)
RBC CASTS # UR COMP ASSIST: SIGNIFICANT CHANGE UP /HPF (ref 0–4)
SODIUM SERPL-SCNC: 134 MMOL/L — LOW (ref 135–145)
SP GR SPEC: 1.01 — SIGNIFICANT CHANGE UP (ref 1.01–1.02)
UROBILINOGEN FLD QL: NEGATIVE MG/DL — SIGNIFICANT CHANGE UP
WBC # BLD: 11.01 K/UL — HIGH (ref 3.8–10.5)
WBC # FLD AUTO: 11.01 K/UL — HIGH (ref 3.8–10.5)
WBC UR QL: SIGNIFICANT CHANGE UP

## 2023-09-30 PROCEDURE — 99232 SBSQ HOSP IP/OBS MODERATE 35: CPT

## 2023-09-30 PROCEDURE — 71045 X-RAY EXAM CHEST 1 VIEW: CPT | Mod: 26

## 2023-09-30 RX ORDER — VANCOMYCIN HCL 1 G
1000 VIAL (EA) INTRAVENOUS ONCE
Refills: 0 | Status: COMPLETED | OUTPATIENT
Start: 2023-09-30 | End: 2023-09-30

## 2023-09-30 RX ORDER — ENOXAPARIN SODIUM 100 MG/ML
40 INJECTION SUBCUTANEOUS EVERY 24 HOURS
Refills: 0 | Status: DISCONTINUED | OUTPATIENT
Start: 2023-09-30 | End: 2023-10-13

## 2023-09-30 RX ORDER — CEFEPIME 1 G/1
INJECTION, POWDER, FOR SOLUTION INTRAMUSCULAR; INTRAVENOUS
Refills: 0 | Status: DISCONTINUED | OUTPATIENT
Start: 2023-09-30 | End: 2023-10-13

## 2023-09-30 RX ORDER — CEFEPIME 1 G/1
2000 INJECTION, POWDER, FOR SOLUTION INTRAMUSCULAR; INTRAVENOUS ONCE
Refills: 0 | Status: COMPLETED | OUTPATIENT
Start: 2023-09-30 | End: 2023-09-30

## 2023-09-30 RX ORDER — METOPROLOL TARTRATE 50 MG
12.5 TABLET ORAL
Refills: 0 | Status: DISCONTINUED | OUTPATIENT
Start: 2023-09-30 | End: 2023-10-10

## 2023-09-30 RX ORDER — CEFEPIME 1 G/1
2000 INJECTION, POWDER, FOR SOLUTION INTRAMUSCULAR; INTRAVENOUS EVERY 12 HOURS
Refills: 0 | Status: DISCONTINUED | OUTPATIENT
Start: 2023-10-01 | End: 2023-10-13

## 2023-09-30 RX ORDER — VANCOMYCIN HCL 1 G
VIAL (EA) INTRAVENOUS
Refills: 0 | Status: DISCONTINUED | OUTPATIENT
Start: 2023-09-30 | End: 2023-10-02

## 2023-09-30 RX ORDER — VANCOMYCIN HCL 1 G
1000 VIAL (EA) INTRAVENOUS EVERY 24 HOURS
Refills: 0 | Status: DISCONTINUED | OUTPATIENT
Start: 2023-10-01 | End: 2023-10-02

## 2023-09-30 RX ADMIN — Medication 10: at 09:03

## 2023-09-30 RX ADMIN — Medication 3 UNIT(S): at 09:03

## 2023-09-30 RX ADMIN — Medication 10 MILLIGRAM(S): at 06:25

## 2023-09-30 RX ADMIN — Medication 3 UNIT(S): at 12:01

## 2023-09-30 RX ADMIN — Medication 650 MILLIGRAM(S): at 00:57

## 2023-09-30 RX ADMIN — Medication 3 UNIT(S): at 17:01

## 2023-09-30 RX ADMIN — ATORVASTATIN CALCIUM 80 MILLIGRAM(S): 80 TABLET, FILM COATED ORAL at 22:56

## 2023-09-30 RX ADMIN — PANTOPRAZOLE SODIUM 40 MILLIGRAM(S): 20 TABLET, DELAYED RELEASE ORAL at 17:04

## 2023-09-30 RX ADMIN — Medication 81 MILLIGRAM(S): at 11:08

## 2023-09-30 RX ADMIN — Medication 12: at 12:01

## 2023-09-30 RX ADMIN — Medication 650 MILLIGRAM(S): at 12:27

## 2023-09-30 RX ADMIN — PANTOPRAZOLE SODIUM 40 MILLIGRAM(S): 20 TABLET, DELAYED RELEASE ORAL at 06:25

## 2023-09-30 RX ADMIN — CEFEPIME 100 MILLIGRAM(S): 1 INJECTION, POWDER, FOR SOLUTION INTRAMUSCULAR; INTRAVENOUS at 18:10

## 2023-09-30 RX ADMIN — INSULIN GLARGINE 10 UNIT(S): 100 INJECTION, SOLUTION SUBCUTANEOUS at 22:55

## 2023-09-30 RX ADMIN — Medication 2: at 17:02

## 2023-09-30 RX ADMIN — BUMETANIDE 2 MILLIGRAM(S): 0.25 INJECTION INTRAMUSCULAR; INTRAVENOUS at 11:18

## 2023-09-30 RX ADMIN — Medication 650 MILLIGRAM(S): at 11:08

## 2023-09-30 RX ADMIN — Medication 250 MILLIGRAM(S): at 16:20

## 2023-09-30 RX ADMIN — CLOPIDOGREL BISULFATE 75 MILLIGRAM(S): 75 TABLET, FILM COATED ORAL at 11:08

## 2023-09-30 NOTE — PROGRESS NOTE ADULT - SUBJECTIVE AND OBJECTIVE BOX
CHIEF COMPLAINT:  Patient is a 58y old  Male who presents with a chief complaint of CHF Exacerbation (29 Sep 2023 14:27)      HPI:  58-year-old with history of type 2 diabetes, hypertension, dyslipidemia, dual-chamber Medtronic ICD in December 22 for Mobitz type II heart block, history of CVA and left hemiplegia admitted with shortness of breath and chest pain along with elevated troponin hypoxia and heart failure symptoms.  Seems to have urinary retention requiring straight catheterization and is diuresing.    REVIEW OF SYSTEMS:  General:  No wt loss, fevers, chills, night sweats  Eyes:  Good vision, no reported pain  ENT:  No sore throat, pain, runny nose, dysphagia  CV:  No pain, palpitations, hypo/hypertension  Resp:  No dyspnea, cough, tachypnea, wheezing  GI:  No pain, nausea, vomiting, diarrhea, constipation  :  No pain, bleeding, incontinence, nocturia  Muscle:  No pain, weakness  Breast:  No pain, abscess, mass, discharge  Neuro:  No weakness, tingling, memory problems  Psych:  No fatigue, insomnia, mood problems, depression  Endocrine:  No polyuria, polydipsia, cold/heat intolerance  Heme:  No petechiae, ecchymosis, easy bruisability  Skin:  No rash, edema    PHYSICAL EXAM:  Vital Signs:  Vital Signs Last 24 Hrs  T(C): 38.8 (30 Sep 2023 10:57), Max: 38.8 (30 Sep 2023 00:50)  T(F): 101.9 (30 Sep 2023 10:57), Max: 101.9 (30 Sep 2023 00:50)  HR: 149 (30 Sep 2023 11:30) (109 - 149)  BP: 106/70 (30 Sep 2023 10:57) (92/60 - 127/87)  BP(mean): 94 (29 Sep 2023 16:30) (90 - 98)  RR: 18 (30 Sep 2023 10:57) (18 - 34)  SpO2: 94% (30 Sep 2023 10:57) (91% - 100%)    Parameters below as of 30 Sep 2023 10:57  Patient On (Oxygen Delivery Method): nasal cannula  O2 Flow (L/min): 3    I&O's Summary    29 Sep 2023 07:01  -  30 Sep 2023 07:00  --------------------------------------------------------  IN: 559.5 mL / OUT: 1550 mL / NET: -990.5 mL    30 Sep 2023 07:01  -  30 Sep 2023 14:22  --------------------------------------------------------  IN: 400 mL / OUT: 300 mL / NET: 100 mL      I&O's Detail    29 Sep 2023 07:01  -  30 Sep 2023 07:00  --------------------------------------------------------  IN:    Heparin Infusion: 34.5 mL    Oral Fluid: 525 mL  Total IN: 559.5 mL    OUT:    Indwelling Catheter - Urethral (mL): 1000 mL    Intermittent Catheterization - Urethral (mL): 500 mL    Voided (mL): 50 mL  Total OUT: 1550 mL    Total NET: -990.5 mL      30 Sep 2023 07:01  -  30 Sep 2023 14:22  --------------------------------------------------------  IN:    Oral Fluid: 400 mL  Total IN: 400 mL    OUT:    Intermittent Catheterization - Urethral (mL): 300 mL  Total OUT: 300 mL    Total NET: 100 mL      Tele:     Constitutional: well developed, normal appearance, well groomed, well nourished, no deformities and no acute distress.   Eyes: eyelids demonstrated no xanthelasmas.   HEENT: normal oral mucosa, no oral pallor and no oral cyanosis.   Neck: normal jugular venous A waves present, normal jugular venous V waves present and no jugular venous bowers A waves.   Pulmonary: no respiratory distress, normal respiratory rhythm and effort, no accessory muscle use.  Cardiovascular: heart rate and rhythm were normal, normal S1 and S2.  Abdomen: soft, non-tender,  Musculoskeletal: the gait could not be assessed.  Extremities: no edema  Skin: normal skin color and pigmentation.  Psychiatric: Lethargic     LABORATORY:                          14.8   11.01 )-----------( 322      ( 30 Sep 2023 06:35 )             46.7     09-30    134<L>  |  101  |  36<H>  ----------------------------<  288<H>  4.3   |  25  |  1.84<H>    Ca    8.5      30 Sep 2023 06:35  Phos  3.5     09-30  Mg     2.4     09-30    TPro  6.5  /  Alb  2.4<L>  /  TBili  0.7  /  DBili  x   /  AST  44<H>  /  ALT  45  /  AlkPhos  58  09-30      CARDIAC MARKERS ( 29 Sep 2023 03:55 )  x     / x     / 170 U/L / x     / x          CAPILLARY BLOOD GLUCOSE    POCT Blood Glucose.: 411 mg/dL (30 Sep 2023 11:32)  POCT Blood Glucose.: 358 mg/dL (30 Sep 2023 08:10)  POCT Blood Glucose.: 386 mg/dL (29 Sep 2023 21:38)  POCT Blood Glucose.: 354 mg/dL (29 Sep 2023 18:06)  POCT Blood Glucose.: 299 mg/dL (29 Sep 2023 15:57)    LIVER FUNCTIONS - ( 30 Sep 2023 06:35 )  Alb: 2.4 g/dL / Pro: 6.5 gm/dL / ALK PHOS: 58 U/L / ALT: 45 U/L / AST: 44 U/L / GGT: x           PTT - ( 30 Sep 2023 06:35 )  PTT:31.6 sec  Urinalysis Basic - ( 30 Sep 2023 06:35 )    Color: x / Appearance: x / SG: x / pH: x  Gluc: 288 mg/dL / Ketone: x  / Bili: x / Urobili: x   Blood: x / Protein: x / Nitrite: x   Leuk Esterase: x / RBC: x / WBC x   Sq Epi: x / Non Sq Epi: x / Bacteria: x      BNP    IMAGING:    < from: Xray Chest 1 View-PORTABLE IMMEDIATE (Xray Chest 1 View-PORTABLE IMMEDIATE .) (09.30.23 @ 13:12) >  IMPRESSION:  New perihilar groundglass like opacities as described above. Follow-up 2   view chest is suggested for more complete evaluation and to confirm   resolution.    < end of copied text >    < from: TTE Echo Complete w/o Contrast w/ Doppler (09.26.23 @ 13:20) >  Summary:   1. Technically limited study.   2. Left ventricular ejection fraction, by visual estimation, is 20%.   3. Severely decreased global left ventricular systolic function.   4. Multiple left ventricular regional wall motion abnormalities exist.   See wall motion findings.   5. Elevated left ventricular end-diastolic pressure.   6. Spectral Doppler shows pseudonormal pattern of left ventricular   myocardial filling (Grade II diastolic dysfunction).   7. Global longitudinal strain using [employer] software at a HR of 121bpm and BP   137/99 is -5.1%, which is severely reduced. There is some sparing of the   basal segments.   8. Moderately reduced RVsystolic function.   9. The left atrium is normal in size.  10. Trivial pericardial effusion.  11. Mild tricuspid regurgitation.    < end of copied text >      ASSESSMENT:  58-year-old with history of type 2 diabetes, hypertension, dyslipidemia, dual-chamber Medtronic ICD in December 22 for Mobitz type II heart block, history of CVA and left hemiplegia admitted with shortness of breath and chest pain along with elevated troponin hypoxia and heart failure symptoms.  Seems to have urinary retention requiring straight catheterization and is diuresing. Acute on chronic heart failure reduced ejection fraction decompensation appears improving.    PLAN:     Continue telemetry cardiac monitoring to rule out dysrhythmia.  Stay on insulin for diabetic control.  Continue aspirin, Plavix and high intensity statin for cardiovascular risk reduction.  Hydralazine if needed and Bumex for blood pressure management and heart failure treatment.  Follow labs. May need Douglass catheter. Consider eventual cardiac ischemic work-up when acute issues improve.    Jm Watkins MD, FACC, FASEM, ELLANC, FACP  Director, Heart Failure Services  [facility]  , Department of Cardiology  [facility]

## 2023-09-30 NOTE — PROGRESS NOTE ADULT - SUBJECTIVE AND OBJECTIVE BOX
57 yo m pmhx CVA with mild left sided deficits, HTN, DM2, vertigo, HLD, Mobitz II s/pp Medtronic dual chamber ICD (22) biba from home with complaints of dyspnea and chest pain and was admitted w/ acute hypoxic respiratory failure likely due to acute pulmonary edema due to acute HFrEF in setting of acute NSTEMI, LAURA and enterovirus infection. He is lying in bed in NAD.    MEDICATIONS  (STANDING):  aspirin enteric coated 81 milliGRAM(s) Oral daily  atorvastatin 80 milliGRAM(s) Oral at bedtime  buMETAnide Injectable 2 milliGRAM(s) IV Push two times a day  cefepime   IVPB      chlorhexidine 2% Cloths 1 Application(s) Topical <User Schedule>  clopidogrel Tablet 75 milliGRAM(s) Oral daily  hydrALAZINE Injectable 10 milliGRAM(s) IV Push every 6 hours  influenza   Vaccine 0.5 milliLiter(s) IntraMuscular once  insulin glargine Injectable (LANTUS) 10 Unit(s) SubCutaneous at bedtime  insulin lispro (ADMELOG) corrective regimen sliding scale   SubCutaneous at bedtime  insulin lispro (ADMELOG) corrective regimen sliding scale   SubCutaneous three times a day before meals  insulin lispro Injectable (ADMELOG) 3 Unit(s) SubCutaneous three times a day before meals  pantoprazole  Injectable 40 milliGRAM(s) IV Push two times a day  vancomycin  IVPB        MEDICATIONS  (PRN):  acetaminophen     Tablet .. 650 milliGRAM(s) Oral every 6 hours PRN Temp greater or equal to 38C (100.4F), Mild Pain (1 - 3)  albuterol    0.083% 2.5 milliGRAM(s) Nebulizer every 6 hours PRN Shortness of Breath and/or Wheezing  dextrose Oral Gel 15 Gram(s) Oral once PRN Blood Glucose LESS THAN 70 milliGRAM(s)/deciliter      Allergies    No Known Allergies    Intolerances        Vital Signs Last 24 Hrs  T(C): 37.6 (30 Sep 2023 15:22), Max: 38.8 (30 Sep 2023 00:50)  T(F): 99.7 (30 Sep 2023 15:22), Max: 101.9 (30 Sep 2023 00:50)  HR: 114 (30 Sep 2023 15:22) (114 - 149)  BP: 109/73 (30 Sep 2023 15:22) (106/70 - 126/78)  BP(mean): --  RR: 18 (30 Sep 2023 15:22) (18 - 18)  SpO2: 95% (30 Sep 2023 15:22) (91% - 96%)    Parameters below as of 30 Sep 2023 15:22  Patient On (Oxygen Delivery Method): nasal cannula  O2 Flow (L/min): 3      PHYSICAL EXAM:  GENERAL: NAD, well-groomed, well-developed  HEAD:  Atraumatic, Normocephalic  EYES: EOMI, PERRLA   NECK: Supple   NERVOUS SYSTEM:  Alert & Oriented X3, Good concentration   CHEST/LUNG: Clear to auscultation bilaterally; No rales, rhonchi, wheezing, or rubs  HEART: Regular rate and rhythm; No murmurs, rubs, or gallops  ABDOMEN: Soft, Nontender, Nondistended; Bowel sounds present  EXTREMITIES: No clubbing, cyanosis, or edema     LABS:                        14.8   11.01 )-----------( 322      ( 30 Sep 2023 06:35 )             46.7     09-30    134<L>  |  101  |  36<H>  ----------------------------<  288<H>  4.3   |  25  |  1.84<H>    Ca    8.5      30 Sep 2023 06:35  Phos  3.5     09-30  Mg     2.4     09-30    TPro  6.5  /  Alb  2.4<L>  /  TBili  0.7  /  DBili  x   /  AST  44<H>  /  ALT  45  /  AlkPhos  58  09-30    PTT - ( 30 Sep 2023 06:35 )  PTT:31.6 sec  Urinalysis Basic - ( 30 Sep 2023 18:23 )    Color: Yellow / Appearance: Clear / S.010 / pH: x  Gluc: x / Ketone: Negative  / Bili: Negative / Urobili: Negative mg/dL   Blood: x / Protein: 30 mg/dL / Nitrite: Negative   Leuk Esterase: Negative / RBC: x / WBC x   Sq Epi: x / Non Sq Epi: x / Bacteria: x      CAPILLARY BLOOD GLUCOSE      POCT Blood Glucose.: 158 mg/dL (30 Sep 2023 16:55)  POCT Blood Glucose.: 411 mg/dL (30 Sep 2023 11:32)  POCT Blood Glucose.: 358 mg/dL (30 Sep 2023 08:10)  POCT Blood Glucose.: 386 mg/dL (29 Sep 2023 21:38)      Culture - Sputum (collected 28 Sep 2023 18:18)  Source: .Sputum Sputum  Gram Stain (prelim) (29 Sep 2023 19:03):    Few polymorphonuclear leukocytes per low power field    Rare Squamous epithelial cells per low power field    Few Gram positive cocci in pairs per oil power field    Few Gram Negative Rods per oil power field    Rare Gram Negative Coccobacilli per oil power field  Preliminary Report (29 Sep 2023 19:03):    Normal Respiratory Tamela present    Culture - Blood (collected 27 Sep 2023 22:20)  Source: .Blood Blood  Preliminary Report (30 Sep 2023 10:01):    No growth at 48 Hours    Culture - Blood (collected 27 Sep 2023 22:00)  Source: .Blood Blood  Preliminary Report (30 Sep 2023 10:01):    No growth at 48 Hours    Culture - Blood (collected 27 Sep 2023 02:40)  Source: .Blood Blood  Preliminary Report (30 Sep 2023 09:01):    No growth at 72 Hours    Culture - Blood (collected 26 Sep 2023 07:55)  Source: .Blood Blood  Preliminary Report (30 Sep 2023 11:01):    No growth at 4 days    Culture - Urine (collected 26 Sep 2023 05:56)  Source: Clean Catch Clean Catch (Midstream)  Final Report (27 Sep 2023 07:10):    <10,000 CFU/mL Normal Urogenital Tamela      RADIOLOGY & ADDITIONAL TESTS:    23 @ 07:01  -  23 @ 07:00  --------------------------------------------------------  IN:    Heparin Infusion: 34.5 mL    Oral Fluid: 525 mL  Total IN: 559.5 mL    OUT:    Indwelling Catheter - Urethral (mL): 1000 mL    Intermittent Catheterization - Urethral (mL): 500 mL    Voided (mL): 50 mL  Total OUT: 1550 mL    Total NET: -990.5 mL      23 @ 07:01  -  23 @ 19:39  --------------------------------------------------------  IN:    Oral Fluid: 400 mL  Total IN: 400 mL    OUT:    Intermittent Catheterization - Urethral (mL): 300 mL  Total OUT: 300 mL    Total NET: 100 mL

## 2023-10-01 DIAGNOSIS — R94.6 ABNORMAL RESULTS OF THYROID FUNCTION STUDIES: ICD-10-CM

## 2023-10-01 LAB
-  AMIKACIN: SIGNIFICANT CHANGE UP
-  AZTREONAM: SIGNIFICANT CHANGE UP
-  CEFEPIME: SIGNIFICANT CHANGE UP
-  CEFTAZIDIME: SIGNIFICANT CHANGE UP
-  CIPROFLOXACIN: SIGNIFICANT CHANGE UP
-  GENTAMICIN: SIGNIFICANT CHANGE UP
-  IMIPENEM: SIGNIFICANT CHANGE UP
-  LEVOFLOXACIN: SIGNIFICANT CHANGE UP
-  MEROPENEM: SIGNIFICANT CHANGE UP
-  PIPERACILLIN/TAZOBACTAM: SIGNIFICANT CHANGE UP
-  TOBRAMYCIN: SIGNIFICANT CHANGE UP
ALBUMIN SERPL ELPH-MCNC: 2.4 G/DL — LOW (ref 3.3–5)
ALP SERPL-CCNC: 57 U/L — SIGNIFICANT CHANGE UP (ref 40–120)
ALT FLD-CCNC: 72 U/L — SIGNIFICANT CHANGE UP (ref 12–78)
ANION GAP SERPL CALC-SCNC: 5 MMOL/L — SIGNIFICANT CHANGE UP (ref 5–17)
AST SERPL-CCNC: 47 U/L — HIGH (ref 15–37)
BILIRUB SERPL-MCNC: 0.7 MG/DL — SIGNIFICANT CHANGE UP (ref 0.2–1.2)
BUN SERPL-MCNC: 34 MG/DL — HIGH (ref 7–23)
CALCIUM SERPL-MCNC: 8.3 MG/DL — LOW (ref 8.5–10.1)
CHLORIDE SERPL-SCNC: 100 MMOL/L — SIGNIFICANT CHANGE UP (ref 96–108)
CO2 SERPL-SCNC: 29 MMOL/L — SIGNIFICANT CHANGE UP (ref 22–31)
CREAT SERPL-MCNC: 1.76 MG/DL — HIGH (ref 0.5–1.3)
CRP SERPL-MCNC: 29 MG/L — HIGH
CULTURE RESULTS: SIGNIFICANT CHANGE UP
CULTURE RESULTS: SIGNIFICANT CHANGE UP
EGFR: 45 ML/MIN/1.73M2 — LOW
ERYTHROCYTE [SEDIMENTATION RATE] IN BLOOD: 43 MM/HR — HIGH (ref 0–20)
GLUCOSE BLDC GLUCOMTR-MCNC: 237 MG/DL — HIGH (ref 70–99)
GLUCOSE BLDC GLUCOMTR-MCNC: 248 MG/DL — HIGH (ref 70–99)
GLUCOSE BLDC GLUCOMTR-MCNC: 251 MG/DL — HIGH (ref 70–99)
GLUCOSE BLDC GLUCOMTR-MCNC: 303 MG/DL — HIGH (ref 70–99)
GLUCOSE BLDC GLUCOMTR-MCNC: 315 MG/DL — HIGH (ref 70–99)
GLUCOSE SERPL-MCNC: 243 MG/DL — HIGH (ref 70–99)
GRAM STN FLD: SIGNIFICANT CHANGE UP
HCT VFR BLD CALC: 42.8 % — SIGNIFICANT CHANGE UP (ref 39–50)
HGB BLD-MCNC: 13.9 G/DL — SIGNIFICANT CHANGE UP (ref 13–17)
HIV 1+2 AB+HIV1 P24 AG SERPL QL IA: SIGNIFICANT CHANGE UP
LACTATE SERPL-SCNC: 1.2 MMOL/L — SIGNIFICANT CHANGE UP (ref 0.7–2)
MAGNESIUM SERPL-MCNC: 2.2 MG/DL — SIGNIFICANT CHANGE UP (ref 1.6–2.6)
MCHC RBC-ENTMCNC: 25.6 PG — LOW (ref 27–34)
MCHC RBC-ENTMCNC: 32.5 G/DL — SIGNIFICANT CHANGE UP (ref 32–36)
MCV RBC AUTO: 79 FL — LOW (ref 80–100)
METHOD TYPE: SIGNIFICANT CHANGE UP
NRBC # BLD: 0 /100 WBCS — SIGNIFICANT CHANGE UP (ref 0–0)
ORGANISM # SPEC MICROSCOPIC CNT: SIGNIFICANT CHANGE UP
ORGANISM # SPEC MICROSCOPIC CNT: SIGNIFICANT CHANGE UP
PHOSPHATE SERPL-MCNC: 2.7 MG/DL — SIGNIFICANT CHANGE UP (ref 2.5–4.5)
PLATELET # BLD AUTO: 306 K/UL — SIGNIFICANT CHANGE UP (ref 150–400)
POTASSIUM SERPL-MCNC: 4 MMOL/L — SIGNIFICANT CHANGE UP (ref 3.5–5.3)
POTASSIUM SERPL-SCNC: 4 MMOL/L — SIGNIFICANT CHANGE UP (ref 3.5–5.3)
PROCALCITONIN SERPL-MCNC: 0.34 NG/ML — HIGH (ref 0.02–0.1)
PROT SERPL-MCNC: 6.3 GM/DL — SIGNIFICANT CHANGE UP (ref 6–8.3)
RBC # BLD: 5.42 M/UL — SIGNIFICANT CHANGE UP (ref 4.2–5.8)
RBC # FLD: 14.1 % — SIGNIFICANT CHANGE UP (ref 10.3–14.5)
RHEUMATOID FACT SERPL-ACNC: <10 IU/ML — SIGNIFICANT CHANGE UP (ref 0–13)
SODIUM SERPL-SCNC: 134 MMOL/L — LOW (ref 135–145)
SPECIMEN SOURCE: SIGNIFICANT CHANGE UP
SPECIMEN SOURCE: SIGNIFICANT CHANGE UP
T4 FREE SERPL-MCNC: 1.7 NG/DL — SIGNIFICANT CHANGE UP (ref 0.9–1.8)
TSH SERPL-MCNC: 0.63 UIU/ML — SIGNIFICANT CHANGE UP (ref 0.36–3.74)
WBC # BLD: 11.71 K/UL — HIGH (ref 3.8–10.5)
WBC # FLD AUTO: 11.71 K/UL — HIGH (ref 3.8–10.5)

## 2023-10-01 PROCEDURE — 99232 SBSQ HOSP IP/OBS MODERATE 35: CPT

## 2023-10-01 RX ADMIN — Medication 650 MILLIGRAM(S): at 01:50

## 2023-10-01 RX ADMIN — Medication 4: at 17:41

## 2023-10-01 RX ADMIN — ENOXAPARIN SODIUM 40 MILLIGRAM(S): 100 INJECTION SUBCUTANEOUS at 06:19

## 2023-10-01 RX ADMIN — BUMETANIDE 2 MILLIGRAM(S): 0.25 INJECTION INTRAMUSCULAR; INTRAVENOUS at 14:59

## 2023-10-01 RX ADMIN — Medication 6: at 08:42

## 2023-10-01 RX ADMIN — Medication 3 UNIT(S): at 17:40

## 2023-10-01 RX ADMIN — Medication 250 MILLIGRAM(S): at 17:43

## 2023-10-01 RX ADMIN — Medication 3 UNIT(S): at 08:41

## 2023-10-01 RX ADMIN — Medication 3 UNIT(S): at 12:14

## 2023-10-01 RX ADMIN — INSULIN GLARGINE 10 UNIT(S): 100 INJECTION, SOLUTION SUBCUTANEOUS at 21:32

## 2023-10-01 RX ADMIN — BUMETANIDE 2 MILLIGRAM(S): 0.25 INJECTION INTRAMUSCULAR; INTRAVENOUS at 06:20

## 2023-10-01 RX ADMIN — Medication 81 MILLIGRAM(S): at 14:58

## 2023-10-01 RX ADMIN — Medication 12.5 MILLIGRAM(S): at 06:20

## 2023-10-01 RX ADMIN — ATORVASTATIN CALCIUM 80 MILLIGRAM(S): 80 TABLET, FILM COATED ORAL at 21:36

## 2023-10-01 RX ADMIN — CEFEPIME 100 MILLIGRAM(S): 1 INJECTION, POWDER, FOR SOLUTION INTRAMUSCULAR; INTRAVENOUS at 17:47

## 2023-10-01 RX ADMIN — PANTOPRAZOLE SODIUM 40 MILLIGRAM(S): 20 TABLET, DELAYED RELEASE ORAL at 06:21

## 2023-10-01 RX ADMIN — CLOPIDOGREL BISULFATE 75 MILLIGRAM(S): 75 TABLET, FILM COATED ORAL at 14:59

## 2023-10-01 RX ADMIN — Medication 8: at 12:15

## 2023-10-01 RX ADMIN — CEFEPIME 100 MILLIGRAM(S): 1 INJECTION, POWDER, FOR SOLUTION INTRAMUSCULAR; INTRAVENOUS at 06:21

## 2023-10-01 RX ADMIN — Medication 12.5 MILLIGRAM(S): at 17:44

## 2023-10-01 RX ADMIN — PANTOPRAZOLE SODIUM 40 MILLIGRAM(S): 20 TABLET, DELAYED RELEASE ORAL at 17:44

## 2023-10-01 RX ADMIN — CHLORHEXIDINE GLUCONATE 1 APPLICATION(S): 213 SOLUTION TOPICAL at 06:21

## 2023-10-01 NOTE — PROGRESS NOTE ADULT - ASSESSMENT
57 yo m pmhx CVA with mild left sided deficits, HTN, DM2, vertigo, HLD, Mobitz II s/pp Medtronic dual chamber ICD (12/21/22) biba from home with complaints of dyspnea and chest pain and was admitted w/ acute hypoxic respiratory failure likely due to acute pulmonary edema due to acute HFrEF in setting of acute NSTEMI, LAURA and enterovirus infection.     Acute hypoxic respiratory failure due to acute sys/ diastolic CHF  - CT on admission showed patchy and confluent ground-glass and consolidative opacities primarily in the bilateral upper and lower lobes c/w a combination of infection and edema and small bilateral pleural effusions  - patient has improved w/ Lasix and Bipap, now on NC  - Echo showed EF 20% w/ severely decreased global left ventricular systolic function w/ multiple left ventricular regional wall motion abnormalities & grade II diastolic dysfunction.   - c/w Bumex & add metoprolol     Elevated Trop  - likely due to MI  - trop peaked at 24,293  - as per cardio, will likely need transfer for cardiac catheterization, once infectious symptoms resolve  - c/w ASA, Plavix & Lipitor  - heparin gtt stopped in ICU    lactic acidosis  - lactate 2.9 on admission  - likely due to hypoxia      Fevers  - may be due to entero/rhino infection, but still may have bacterial infection as fevers have persisted - concern for Multifocal PNA  - as per ID, started vanc anc Cefepime  - s/p Ceftriaxone in ICU    - Sputum + on 9/29 gram+ cocci in pairs, gram -rods and gram - coccobacilli    - CXR showed new perihilar groundglass like opacities, will get 2 view CXR  - as per ID, will check HIV, ESR, HAIDER, ANCA, RF    Urinary retention   - Douglass placed 9/30 after multiple straight caths  - consult urology    LAURA  - ATN vs cardiorenal  - Cr stable     HTN  - stop IV hydralazine & add metoprolol     DM  - c/w Lantus, mealtime lispro and ISS  - hgb A1C is 6.9    Possible Hyperthyroidism  - CT showed goiter  - TSH is 0.258 but free T4 is 2, will recheck and consult endo     Prophylaxis:  DVT: Lovenox  GI: Protonix     Dispo: [facility]

## 2023-10-01 NOTE — PHYSICAL THERAPY INITIAL EVALUATION ADULT - COORDINATION ASSESSED, REHAB EVAL
slightly sluggish movements and some incoordination in fine motor or even gross motor movements/finger to nose/heel to shin

## 2023-10-01 NOTE — PROGRESS NOTE ADULT - SUBJECTIVE AND OBJECTIVE BOX
59 yo m pmhx CVA with mild left sided deficits, HTN, DM2, vertigo, HLD, Mobitz II s/pp Medtronic dual chamber ICD (12/21/22) biba from home with complaints of dyspnea and chest pain and was admitted w/ acute hypoxic respiratory failure likely due to acute pulmonary edema due to acute HFrEF in setting of acute NSTEMI, LAURA and enterovirus infection. He is lying in bed in NAD.      MEDICATIONS  (STANDING):  aspirin enteric coated 81 milliGRAM(s) Oral daily  atorvastatin 80 milliGRAM(s) Oral at bedtime  buMETAnide Injectable 2 milliGRAM(s) IV Push two times a day  cefepime   IVPB      cefepime   IVPB 2000 milliGRAM(s) IV Intermittent every 12 hours  chlorhexidine 2% Cloths 1 Application(s) Topical <User Schedule>  clopidogrel Tablet 75 milliGRAM(s) Oral daily  enoxaparin Injectable 40 milliGRAM(s) SubCutaneous every 24 hours  influenza   Vaccine 0.5 milliLiter(s) IntraMuscular once  insulin glargine Injectable (LANTUS) 10 Unit(s) SubCutaneous at bedtime  insulin lispro (ADMELOG) corrective regimen sliding scale   SubCutaneous at bedtime  insulin lispro (ADMELOG) corrective regimen sliding scale   SubCutaneous three times a day before meals  insulin lispro Injectable (ADMELOG) 3 Unit(s) SubCutaneous three times a day before meals  metoprolol succinate ER 12.5 milliGRAM(s) Oral two times a day  pantoprazole  Injectable 40 milliGRAM(s) IV Push two times a day  vancomycin  IVPB 1000 milliGRAM(s) IV Intermittent every 24 hours  vancomycin  IVPB        MEDICATIONS  (PRN):  acetaminophen     Tablet .. 650 milliGRAM(s) Oral every 6 hours PRN Temp greater or equal to 38C (100.4F), Mild Pain (1 - 3)  albuterol    0.083% 2.5 milliGRAM(s) Nebulizer every 6 hours PRN Shortness of Breath and/or Wheezing  dextrose Oral Gel 15 Gram(s) Oral once PRN Blood Glucose LESS THAN 70 milliGRAM(s)/deciliter      Allergies    No Known Allergies    Intolerances        Vital Signs Last 24 Hrs  T(C): 37.2 (01 Oct 2023 16:40), Max: 38.7 (01 Oct 2023 00:44)  T(F): 99 (01 Oct 2023 16:40), Max: 101.7 (01 Oct 2023 00:44)  HR: 114 (01 Oct 2023 16:40) (54 - 123)  BP: 107/74 (01 Oct 2023 16:40) (107/74 - 116/73)   RR: 19 (01 Oct 2023 16:40) (18 - 19)  SpO2: 97% (01 Oct 2023 16:40) (87% - 97%)    Parameters below as of 01 Oct 2023 08:50  Patient On (Oxygen Delivery Method): nasal cannula  O2 Flow (L/min): 3      PHYSICAL EXAM:  GENERAL: NAD, well-groomed, well-developed  HEAD:  Atraumatic, Normocephalic  EYES: EOMI, PERRLA   NECK: Supple   NERVOUS SYSTEM:  Alert & Oriented X3, Good concentration   CHEST/LUNG: Clear to auscultation bilaterally; No rales, rhonchi, wheezing, or rubs  HEART: Regular rate and rhythm; No murmurs, rubs, or gallops  ABDOMEN: Soft, Nontender, Nondistended; Bowel sounds present  EXTREMITIES: No clubbing, cyanosis, or edema       LABS:                        13.9   11.71 )-----------( 306      ( 01 Oct 2023 07:20 )             42.8     10-01    134<L>  |  100  |  34<H>  ----------------------------<  243<H>  4.0   |  29  |  1.76<H>    Ca    8.3<L>      01 Oct 2023 07:20  Phos  2.7     10-01  Mg     2.2     10-01    TPro  6.3  /  Alb  2.4<L>  /  TBili  0.7  /  DBili  x   /  AST  47<H>  /  ALT  72  /  AlkPhos  57  10-01    PTT - ( 30 Sep 2023 06:35 )  PTT:31.6 sec  Urinalysis Basic - ( 01 Oct 2023 07:20 )    Color: x / Appearance: x / SG: x / pH: x  Gluc: 243 mg/dL / Ketone: x  / Bili: x / Urobili: x   Blood: x / Protein: x / Nitrite: x   Leuk Esterase: x / RBC: x / WBC x   Sq Epi: x / Non Sq Epi: x / Bacteria: x      CAPILLARY BLOOD GLUCOSE      POCT Blood Glucose.: 237 mg/dL (01 Oct 2023 17:01)  POCT Blood Glucose.: 303 mg/dL (01 Oct 2023 12:08)  POCT Blood Glucose.: 251 mg/dL (01 Oct 2023 07:43)  POCT Blood Glucose.: 217 mg/dL (30 Sep 2023 22:03)      Culture - Blood (collected 30 Sep 2023 14:15)  Source: .Blood Blood  Preliminary Report (01 Oct 2023 20:01):    No growth at 24 hours    Culture - Blood (collected 30 Sep 2023 14:05)  Source: .Blood Blood  Preliminary Report (01 Oct 2023 20:01):    No growth at 24 hours    Culture - Sputum (collected 28 Sep 2023 18:18)  Source: .Sputum Sputum  Gram Stain (prelim) (29 Sep 2023 19:03):    Few polymorphonuclear leukocytes per low power field    Rare Squamous epithelial cells per low power field    Few Gram positive cocci in pairs per oil power field    Few Gram Negative Rods per oil power field    Rare Gram Negative Coccobacilli per oil power field  Preliminary Report (30 Sep 2023 20:05):    Rare Pseudomonas aeruginosa    Normal Respiratory Tamela present    Culture - Blood (collected 27 Sep 2023 22:20)  Source: .Blood Blood  Preliminary Report (01 Oct 2023 10:00):    No growth at 72 Hours    Culture - Blood (collected 27 Sep 2023 22:00)  Source: .Blood Blood  Preliminary Report (01 Oct 2023 10:00):    No growth at 72 Hours    Culture - Blood (collected 27 Sep 2023 02:40)  Source: .Blood Blood  Preliminary Report (01 Oct 2023 09:00):    No growth at 4 days      RADIOLOGY & ADDITIONAL TESTS:    09-30-23 @ 07:01  -  10-01-23 @ 07:00  --------------------------------------------------------  IN:    Oral Fluid: 400 mL  Total IN: 400 mL    OUT:    Indwelling Catheter - Urethral (mL): 800 mL    Intermittent Catheterization - Urethral (mL): 300 mL  Total OUT: 1100 mL    Total NET: -700 mL

## 2023-10-01 NOTE — CONSULT NOTE ADULT - PROBLEM SELECTOR RECOMMENDATION 9
Patient's TSH is in the normal range.  Free thyroxine is increased but it is being seen these days in the community as the labs may be getting a different reagent and hence many patients are being found to have elevated free thyroxine.  If free thyroxine is really high it may also indicate towards mild thyroid hormone resistance at the receptor level.  As the TSH is normal patient is not having any hypothyroidism and which is not contributing to any of his cardiac symptoms.  Currently no treatment is required but just a repeat of thyroid function test in 2 to 3 days  Thank you for the courtesy of this consultation

## 2023-10-01 NOTE — PROGRESS NOTE ADULT - SUBJECTIVE AND OBJECTIVE BOX
CHIEF COMPLAINT:  Patient is a 58y old  Male who presents with a chief complaint of CHF Exacerbation (29 Sep 2023 14:27)      HPI:  58-year-old with history of type 2 diabetes, hypertension, dyslipidemia, dual-chamber Medtronic ICD in December 22 for Mobitz type II heart block, history of CVA and left hemiplegia admitted with shortness of breath and chest pain along with elevated troponin hypoxia and heart failure symptoms.  Seems to have urinary retention requiring straight catheterization and is diuresing.    REVIEW OF SYSTEMS:  francis placed    PHYSICAL EXAM:  Vital Signs Last 24 Hrs  T(C): 37.2 (01 Oct 2023 16:40), Max: 38.7 (01 Oct 2023 00:44)  T(F): 99 (01 Oct 2023 16:40), Max: 101.7 (01 Oct 2023 00:44)  HR: 114 (01 Oct 2023 16:40) (54 - 123)  BP: 107/74 (01 Oct 2023 16:40) (107/74 - 116/73)  RR: 19 (01 Oct 2023 16:40) (18 - 19)  SpO2: 97% (01 Oct 2023 16:40) (87% - 97%)    Parameters below as of 01 Oct 2023 08:50  Patient On (Oxygen Delivery Method): nasal cannula  O2 Flow (L/min): 3    Tele: SR/ST    Constitutional: no acute distress.   Eyes: eyelids demonstrated no xanthelasmas.   HEENT: normal oral mucosa, no oral pallor and no oral cyanosis.   Neck: normal jugular venous A waves present, normal jugular venous V waves present and no jugular venous bowers A waves.   Pulmonary: no respiratory distress, normal respiratory rhythm and effort, no accessory muscle use.  Cardiovascular: heart rate and rhythm were normal, normal S1 and S2.  Abdomen: soft, non-tender,  Musculoskeletal: the gait could not be assessed.  Extremities: no edema  Psychiatric: Lethargic     LABORATORY:                        13.9   11.71 )-----------( 306      ( 01 Oct 2023 07:20 )             42.8     10-01    134<L>  |  100  |  34<H>  ----------------------------<  243<H>  4.0   |  29  |  1.76<H>    Ca    8.3<L>      01 Oct 2023 07:20  Phos  2.7     10-01  Mg     2.2     10-01    TPro  6.3  /  Alb  2.4<L>  /  TBili  0.7  /  DBili  x   /  AST  47<H>  /  ALT  72  /  AlkPhos  57  10-01      IMAGING:  < from: 12 Lead ECG (09.26.23 @ 18:48) >   Atrial-sensed ventricular-paced rhythm  Abnormal ECG  When compared with ECG of 26-SEP-2023 09:43,  Vent. rate has decreased BY  11 BPM    < end of copied text >    < from: Xray Chest 1 View-PORTABLE IMMEDIATE (Xray Chest 1 View-PORTABLE IMMEDIATE .) (09.30.23 @ 13:12) >  IMPRESSION:  New perihilar groundglass like opacities as described above. Follow-up 2   view chest is suggested for more complete evaluation and to confirm   resolution.    < end of copied text >    < from: TTE Echo Complete w/o Contrast w/ Doppler (09.26.23 @ 13:20) >  Summary:   1. Technically limited study.   2. Left ventricular ejection fraction, by visual estimation, is 20%.   3. Severely decreased global left ventricular systolic function.   4. Multiple left ventricular regional wall motion abnormalities exist.   See wall motion findings.   5. Elevated left ventricular end-diastolic pressure.   6. Spectral Doppler shows pseudonormal pattern of left ventricular   myocardial filling (Grade II diastolic dysfunction).   7. Global longitudinal strain using [employer] software at a HR of 121bpm and BP   137/99 is -5.1%, which is severely reduced. There is some sparing of the   basal segments.   8. Moderately reduced RVsystolic function.   9. The left atrium is normal in size.  10. Trivial pericardial effusion.  11. Mild tricuspid regurgitation.    < end of copied text >      ASSESSMENT:  58-year-old with history of type 2 diabetes, hypertension, dyslipidemia, dual-chamber Medtronic ICD in December 22 for Mobitz type II heart block, history of CVA and left hemiplegia admitted with shortness of breath and chest pain along with elevated troponin hypoxia and heart failure symptoms.  Seems to have urinary retention requiring straight catheterization and is diuresing. Acute on chronic heart failure reduced ejection fraction decompensation appears improving.    PLAN:     Continue telemetry cardiac monitoring to rule out dysrhythmia.  Stay on insulin for diabetic control.  Continue aspirin, Plavix and high intensity statin for cardiovascular risk reduction.  Hydralazine if needed and Bumex for blood pressure management and heart failure treatment.  Follow labs. May need Francis catheter. Consider eventual cardiac ischemic work-up when acute issues improve.      aspirin enteric coated 81 milliGRAM(s) Oral daily  atorvastatin 80 milliGRAM(s) Oral at bedtime  buMETAnide Injectable 2 milliGRAM(s) IV Push two times a day  cefepime   IVPB 2000 milliGRAM(s) IV Intermittent every 12 hours    chlorhexidine 2% Cloths 1 Application(s) Topical <User Schedule>  clopidogrel Tablet 75 milliGRAM(s) Oral daily  enoxaparin Injectable 40 milliGRAM(s) SubCutaneous every 24 hours  influenza   Vaccine 0.5 milliLiter(s) IntraMuscular once  insulin glargine Injectable (LANTUS) 10 Unit(s) SubCutaneous at bedtime  insulin lispro (ADMELOG) corrective regimen sliding scale   SubCutaneous three times a day before meals  insulin lispro (ADMELOG) corrective regimen sliding scale   SubCutaneous at bedtime  insulin lispro Injectable (ADMELOG) 3 Unit(s) SubCutaneous three times a day before meals  metoprolol succinate ER 12.5 milliGRAM(s) Oral two times a day  pantoprazole  Injectable 40 milliGRAM(s) IV Push two times a day  vancomycin  IVPB 1000 milliGRAM(s) IV Intermittent every 24 hours         Jm Watkins MD, FACC, ROSITA, BARB, FACP  Director, Heart Failure Services  [facility]  , Department of Cardiology  [facility]     CHIEF COMPLAINT:  Patient is a 58y old  Male who presents with a chief complaint of CHF Exacerbation (29 Sep 2023 14:27)      HPI:  58-year-old with hypertension, dyslipidemia, type 2 diabetes, dual-chamber Medtronic ICD CVA with residual left hemiplegia admitted with chest pain and shortness of breath along with hypoxia and elevated troponin.  Being treated for heart failure symptoms as well.  Urinary retention noted.  Francis catheter placed.    REVIEW OF SYSTEMS:  francis placed    PHYSICAL EXAM:  Vital Signs Last 24 Hrs  T(C): 37.2 (01 Oct 2023 16:40), Max: 38.7 (01 Oct 2023 00:44)  T(F): 99 (01 Oct 2023 16:40), Max: 101.7 (01 Oct 2023 00:44)  HR: 114 (01 Oct 2023 16:40) (54 - 123)  BP: 107/74 (01 Oct 2023 16:40) (107/74 - 116/73)  RR: 19 (01 Oct 2023 16:40) (18 - 19)  SpO2: 97% (01 Oct 2023 16:40) (87% - 97%)    Parameters below as of 01 Oct 2023 08:50  Patient On (Oxygen Delivery Method): nasal cannula  O2 Flow (L/min): 3    Tele: SR/ST    Constitutional: no acute distress.   Eyes: eyelids demonstrated no xanthelasmas.   HEENT: normal oral mucosa, no oral pallor and no oral cyanosis.   Neck: normal jugular venous A waves present, normal jugular venous V waves present and no jugular venous bowers A waves.   Pulmonary: no respiratory distress, normal respiratory rhythm and effort, no accessory muscle use.  Cardiovascular: heart rate and rhythm were normal, normal S1 and S2.  Abdomen: soft, non-tender,  Musculoskeletal: the gait could not be assessed.  Extremities: no edema  Psychiatric: Lethargic     LABORATORY:                        13.9   11.71 )-----------( 306      ( 01 Oct 2023 07:20 )             42.8     10-01    134<L>  |  100  |  34<H>  ----------------------------<  243<H>  4.0   |  29  |  1.76<H>    Ca    8.3<L>      01 Oct 2023 07:20  Phos  2.7     10-01  Mg     2.2     10-01    TPro  6.3  /  Alb  2.4<L>  /  TBili  0.7  /  DBili  x   /  AST  47<H>  /  ALT  72  /  AlkPhos  57  10-01      IMAGING:  < from: 12 Lead ECG (09.26.23 @ 18:48) >   Atrial-sensed ventricular-paced rhythm  Abnormal ECG  When compared with ECG of 26-SEP-2023 09:43,  Vent. rate has decreased BY  11 BPM    < end of copied text >    < from: Xray Chest 1 View-PORTABLE IMMEDIATE (Xray Chest 1 View-PORTABLE IMMEDIATE .) (09.30.23 @ 13:12) >  IMPRESSION:  New perihilar groundglass like opacities as described above. Follow-up 2   view chest is suggested for more complete evaluation and to confirm   resolution.    < end of copied text >    < from: TTE Echo Complete w/o Contrast w/ Doppler (09.26.23 @ 13:20) >  Summary:   1. Technically limited study.   2. Left ventricular ejection fraction, by visual estimation, is 20%.   3. Severely decreased global left ventricular systolic function.   4. Multiple left ventricular regional wall motion abnormalities exist.   See wall motion findings.   5. Elevated left ventricular end-diastolic pressure.   6. Spectral Doppler shows pseudonormal pattern of left ventricular   myocardial filling (Grade II diastolic dysfunction).   7. Global longitudinal strain using [employer] software at a HR of 121bpm and BP   137/99 is -5.1%, which is severely reduced. There is some sparing of the   basal segments.   8. Moderately reduced RVsystolic function.   9. The left atrium is normal in size.  10. Trivial pericardial effusion.  11. Mild tricuspid regurgitation.    < end of copied text >      ASSESSMENT:  58-year-old with hypertension, dyslipidemia, type 2 diabetes, dual-chamber Medtronic ICD CVA with residual left hemiplegia admitted with chest pain and shortness of breath along with hypoxia and elevated troponin.  Being treated for heart failure symptoms as well.  Urinary retention noted.  Francis catheter placed.    PLAN:     For chronic heart failure reduced EF management continue Bumex 2 mg IV push twice daily with goal of negative fluid status.  Continue metoprolol as well.  Antibiotics per medical team.  Fluid and sodium restriction.  Continue glycemic management for diabetes control.  DVT prevention with Lovenox.  Continue aspirin, Plavix and high intensity statin for cardiovascular risk reduction.         Jm Watkins MD, FACC, ROSITA, BARB, FACP  Director, Heart Failure Services  [facility]  , Department of Cardiology  [facility]

## 2023-10-01 NOTE — PHYSICAL THERAPY INITIAL EVALUATION ADULT - LIVES WITH, PROFILE
Pt states he lives in pvt house with wife , 5 steps with rails to enter the house, 1 flight of steps with rails to the 2nd floor bedroom.

## 2023-10-01 NOTE — PHYSICAL THERAPY INITIAL EVALUATION ADULT - ADDITIONAL COMMENTS
Pt states prior to this admission he uses a cane but not all the time, he states he also has  a rolling walker and a wheelchair at home (*this info to be confirmed from family members for accuracy.

## 2023-10-02 LAB
ALBUMIN SERPL ELPH-MCNC: 2.2 G/DL — LOW (ref 3.3–5)
ALP SERPL-CCNC: 57 U/L — SIGNIFICANT CHANGE UP (ref 40–120)
ALT FLD-CCNC: 85 U/L — HIGH (ref 12–78)
ANION GAP SERPL CALC-SCNC: 6 MMOL/L — SIGNIFICANT CHANGE UP (ref 5–17)
AST SERPL-CCNC: 42 U/L — HIGH (ref 15–37)
AUTO DIFF PNL BLD: NEGATIVE — SIGNIFICANT CHANGE UP
BILIRUB SERPL-MCNC: 0.8 MG/DL — SIGNIFICANT CHANGE UP (ref 0.2–1.2)
BUN SERPL-MCNC: 32 MG/DL — HIGH (ref 7–23)
C-ANCA SER-ACNC: NEGATIVE — SIGNIFICANT CHANGE UP
CALCIUM SERPL-MCNC: 8.2 MG/DL — LOW (ref 8.5–10.1)
CHLORIDE SERPL-SCNC: 98 MMOL/L — SIGNIFICANT CHANGE UP (ref 96–108)
CO2 SERPL-SCNC: 30 MMOL/L — SIGNIFICANT CHANGE UP (ref 22–31)
CREAT SERPL-MCNC: 1.73 MG/DL — HIGH (ref 0.5–1.3)
CULTURE RESULTS: NO GROWTH — SIGNIFICANT CHANGE UP
CULTURE RESULTS: SIGNIFICANT CHANGE UP
EGFR: 46 ML/MIN/1.73M2 — LOW
GLUCOSE BLDC GLUCOMTR-MCNC: 190 MG/DL — HIGH (ref 70–99)
GLUCOSE BLDC GLUCOMTR-MCNC: 206 MG/DL — HIGH (ref 70–99)
GLUCOSE BLDC GLUCOMTR-MCNC: 320 MG/DL — HIGH (ref 70–99)
GLUCOSE BLDC GLUCOMTR-MCNC: 327 MG/DL — HIGH (ref 70–99)
GLUCOSE SERPL-MCNC: 238 MG/DL — HIGH (ref 70–99)
HCT VFR BLD CALC: 42.8 % — SIGNIFICANT CHANGE UP (ref 39–50)
HGB BLD-MCNC: 13.8 G/DL — SIGNIFICANT CHANGE UP (ref 13–17)
MAGNESIUM SERPL-MCNC: 2.2 MG/DL — SIGNIFICANT CHANGE UP (ref 1.6–2.6)
MCHC RBC-ENTMCNC: 25.6 PG — LOW (ref 27–34)
MCHC RBC-ENTMCNC: 32.2 G/DL — SIGNIFICANT CHANGE UP (ref 32–36)
MCV RBC AUTO: 79.3 FL — LOW (ref 80–100)
METFORMIN SERPL-MCNC: SIGNIFICANT CHANGE UP MCG/ML
NRBC # BLD: 0 /100 WBCS — SIGNIFICANT CHANGE UP (ref 0–0)
P-ANCA SER-ACNC: NEGATIVE — SIGNIFICANT CHANGE UP
PHOSPHATE SERPL-MCNC: 3.2 MG/DL — SIGNIFICANT CHANGE UP (ref 2.5–4.5)
PLATELET # BLD AUTO: 305 K/UL — SIGNIFICANT CHANGE UP (ref 150–400)
POTASSIUM SERPL-MCNC: 4.1 MMOL/L — SIGNIFICANT CHANGE UP (ref 3.5–5.3)
POTASSIUM SERPL-SCNC: 4.1 MMOL/L — SIGNIFICANT CHANGE UP (ref 3.5–5.3)
PROT SERPL-MCNC: 6.3 GM/DL — SIGNIFICANT CHANGE UP (ref 6–8.3)
RBC # BLD: 5.4 M/UL — SIGNIFICANT CHANGE UP (ref 4.2–5.8)
RBC # FLD: 14 % — SIGNIFICANT CHANGE UP (ref 10.3–14.5)
SODIUM SERPL-SCNC: 134 MMOL/L — LOW (ref 135–145)
SPECIMEN SOURCE: SIGNIFICANT CHANGE UP
SPECIMEN SOURCE: SIGNIFICANT CHANGE UP
WBC # BLD: 11.37 K/UL — HIGH (ref 3.8–10.5)
WBC # FLD AUTO: 11.37 K/UL — HIGH (ref 3.8–10.5)

## 2023-10-02 PROCEDURE — 71250 CT THORAX DX C-: CPT | Mod: 26

## 2023-10-02 PROCEDURE — 74176 CT ABD & PELVIS W/O CONTRAST: CPT | Mod: 26

## 2023-10-02 PROCEDURE — 99223 1ST HOSP IP/OBS HIGH 75: CPT

## 2023-10-02 PROCEDURE — 71046 X-RAY EXAM CHEST 2 VIEWS: CPT | Mod: 26

## 2023-10-02 PROCEDURE — 99233 SBSQ HOSP IP/OBS HIGH 50: CPT

## 2023-10-02 PROCEDURE — 99232 SBSQ HOSP IP/OBS MODERATE 35: CPT

## 2023-10-02 RX ORDER — IOHEXOL 300 MG/ML
30 INJECTION, SOLUTION INTRAVENOUS ONCE
Refills: 0 | Status: COMPLETED | OUTPATIENT
Start: 2023-10-02 | End: 2023-10-02

## 2023-10-02 RX ADMIN — Medication 650 MILLIGRAM(S): at 16:29

## 2023-10-02 RX ADMIN — CEFEPIME 100 MILLIGRAM(S): 1 INJECTION, POWDER, FOR SOLUTION INTRAMUSCULAR; INTRAVENOUS at 05:38

## 2023-10-02 RX ADMIN — BUMETANIDE 2 MILLIGRAM(S): 0.25 INJECTION INTRAMUSCULAR; INTRAVENOUS at 14:39

## 2023-10-02 RX ADMIN — PANTOPRAZOLE SODIUM 40 MILLIGRAM(S): 20 TABLET, DELAYED RELEASE ORAL at 17:29

## 2023-10-02 RX ADMIN — Medication 4: at 08:35

## 2023-10-02 RX ADMIN — Medication 81 MILLIGRAM(S): at 12:04

## 2023-10-02 RX ADMIN — Medication 8: at 12:05

## 2023-10-02 RX ADMIN — BUMETANIDE 2 MILLIGRAM(S): 0.25 INJECTION INTRAMUSCULAR; INTRAVENOUS at 05:38

## 2023-10-02 RX ADMIN — CEFEPIME 100 MILLIGRAM(S): 1 INJECTION, POWDER, FOR SOLUTION INTRAMUSCULAR; INTRAVENOUS at 17:31

## 2023-10-02 RX ADMIN — INSULIN GLARGINE 10 UNIT(S): 100 INJECTION, SOLUTION SUBCUTANEOUS at 21:01

## 2023-10-02 RX ADMIN — CLOPIDOGREL BISULFATE 75 MILLIGRAM(S): 75 TABLET, FILM COATED ORAL at 12:04

## 2023-10-02 RX ADMIN — ENOXAPARIN SODIUM 40 MILLIGRAM(S): 100 INJECTION SUBCUTANEOUS at 12:05

## 2023-10-02 RX ADMIN — Medication 3 UNIT(S): at 17:28

## 2023-10-02 RX ADMIN — Medication 650 MILLIGRAM(S): at 01:25

## 2023-10-02 RX ADMIN — Medication 12.5 MILLIGRAM(S): at 17:29

## 2023-10-02 RX ADMIN — CHLORHEXIDINE GLUCONATE 1 APPLICATION(S): 213 SOLUTION TOPICAL at 05:38

## 2023-10-02 RX ADMIN — IOHEXOL 30 MILLILITER(S): 300 INJECTION, SOLUTION INTRAVENOUS at 16:34

## 2023-10-02 RX ADMIN — Medication 3 UNIT(S): at 12:05

## 2023-10-02 RX ADMIN — Medication 8: at 17:28

## 2023-10-02 RX ADMIN — ATORVASTATIN CALCIUM 80 MILLIGRAM(S): 80 TABLET, FILM COATED ORAL at 21:01

## 2023-10-02 RX ADMIN — Medication 3 UNIT(S): at 08:35

## 2023-10-02 NOTE — PROGRESS NOTE ADULT - ASSESSMENT
58M w PMHx  CAD, CVA, DM (suboptimal compliance, checks FS Q4days though a1c <7) PPM/AICD for bradycardia/Mobitz II, LAD- thyroid tissue in retrospect,  now presenting with pneumonia, CHF, NSTEMI, renal insufficiency. Patient treated for CAP, still febrile though at lease at present hyperthermia blanket could have been playing a role.   Seems out of proportion to attribute to rhinovirus/enterovirus  More extensive pneumonia has a more delayed response to therapy  No RF for resistant fredi elicited  Legionella negative  AICD site benign, cx NTD  Exam otherwise unrevealing for acute infectious process.   COVID-negative x2  Blood cx NTD  TSH not suppressed    10/2: pt is feeling better, however, continues having fevers, had a fever of 101.6  at midnight and 101.2 today midday, on NC, WBC without significant change 11.37, Cr is about the same 1.73, BCs and UCs with no growth to date, abx were changed to cefepime and vanco over the weekend, Cefepime IV continued, Vancomycin stopped today.   Pt will need re-imaging to evaluate for the source of pt's fevers.     Suggestions--  Check MRSA nares PCR - order is there, spoke with RN  Standard precautions  Continue cefepime IV  Stop Vancomycin IV  obtain CT c/a/p with po contrast - pending   follow culture data  trend temperatures and WBC  monitor pt's renal function   HIV test - negative  ANCA pending   ESR 43  RF <10    discussed with Dr. Cornejo  discussed with the pt and his wife at the bedside, all questions answered to the best of my ability

## 2023-10-02 NOTE — PROGRESS NOTE ADULT - ASSESSMENT
57 yo m pmhx CVA with mild left sided deficits, HTN, DM2, vertigo, HLD, Mobitz II s/pp Medtronic dual chamber ICD (12/21/22) biba from home with complaints of dyspnea and chest pain and was admitted w/ acute hypoxic respiratory failure likely due to acute pulmonary edema due to acute HFrEF in setting of acute NSTEMI, LAURA and enterovirus infection. He is lying in bed in NAD.    Echo: 9/26/23 LVEF 20%. +WMA. Grade 2 DD.  Global longitudinal strain using Recurve software at a HR of 121bpm and BP   137/99 is -5.1%, which is severely reduced. There is some sparing of the basal segments.    1) Acute Hypoxic Respiratory Failure 2ndary Acute HFrEF and +entero/rhinovirus/ +Pseudomonas  2) NSTEMI (troponin 24,293)  3) Cardiomyopathy (LVEF 20%) s/p ICD  4) HTN  5) DM-2           59 yo m pmhx CVA with mild left sided deficits, HTN, DM2, vertigo, HLD, Mobitz II s/pp Medtronic dual chamber ICD (12/21/22) biba from home with complaints of dyspnea and chest pain and was admitted w/ acute hypoxic respiratory failure likely due to acute pulmonary edema due to acute HFrEF in setting of acute NSTEMI, LAURA and enterovirus infection. He is lying in bed in NAD.    Echo: 9/26/23 LVEF 20%. +WMA. Grade 2 DD.  Global longitudinal strain using An[employer]u software at a HR of 121bpm and /99 is -5.1%, which is severely reduced. There is some sparing of the basal segments.    1) Acute Hypoxic Respiratory Failure 2ndary Acute HFrEF and +entero/rhinovirus/ +Pseudomonas  2) NSTEMI (troponin 24,293)  3) Cardiomyopathy (LVEF 20%) s/p ICD (Medtroninc, DDD, 12/21/22)  4) HTN  5) DM-2    - tele reviewed: sinus tachycardia 120s  - c/w ASA/Plavix  - c/w Bumex 2mg IV BID, Toprol XL 12.5mg BID, will optimize GDMT when bp and renal function improves  - antibiotics as per primary team, when infection improves and cultures negative, will then arrange for transfer for cardiac cath.         59 yo m pmhx CVA with mild left sided deficits, HTN, DM2, vertigo, HLD, Mobitz II s/pp Medtronic dual chamber ICD (12/21/22) biba from home with complaints of dyspnea and chest pain and was admitted w/ acute hypoxic respiratory failure likely due to acute pulmonary edema due to acute HFrEF in setting of acute NSTEMI, LAURA and enterovirus infection. He is lying in bed in NAD.    Echo: 9/26/23 LVEF 20%. +WMA. Grade 2 DD.  Global longitudinal strain using [employer] software at a HR of 121bpm and /99 is -5.1%, which is severely reduced. There is some sparing of the basal segments.    1) Acute Hypoxic Respiratory Failure 2ndary Acute HFrEF and +entero/rhinovirus/ +Pseudomonas  2) NSTEMI (troponin 24,293)  3) Cardiomyopathy (LVEF 20%) s/p ICD (Medtroninc, DDD, 12/21/22)  4) HTN  5) DM-2    - tele reviewed: sinus tachycardia 120s  - c/w ASA/Plavix  - c/w Bumex 2mg IV BID, Toprol XL 12.5mg BID, will optimize GDMT when bp and renal function improves

## 2023-10-02 NOTE — PROGRESS NOTE ADULT - SUBJECTIVE AND OBJECTIVE BOX
Patient is a 58y old  Male who presents with a chief complaint of CHF Exacerbation (02 Oct 2023 05:59)      INTERVAL HX:  Patient seen and examined @ bedside.  No respiratory distress, chest pain or SOB.    PAST MEDICAL & SURGICAL HISTORY:  CVA (cerebral vascular accident)      HTN (hypertension)      DM (diabetes mellitus)      HTN (hypertension)      CVA (cerebrovascular accident)      HTN (hypertension)      DM (diabetes mellitus)      HTN (hypertension)      S/P hernia repair  right inguinal hernia 1992      S/P cystoscopy  2012        	  MEDICATIONS:  MEDICATIONS  (STANDING):  aspirin enteric coated 81 milliGRAM(s) Oral daily  atorvastatin 80 milliGRAM(s) Oral at bedtime  buMETAnide Injectable 2 milliGRAM(s) IV Push two times a day  cefepime   IVPB      cefepime   IVPB 2000 milliGRAM(s) IV Intermittent every 12 hours  chlorhexidine 2% Cloths 1 Application(s) Topical <User Schedule>  clopidogrel Tablet 75 milliGRAM(s) Oral daily  enoxaparin Injectable 40 milliGRAM(s) SubCutaneous every 24 hours  influenza   Vaccine 0.5 milliLiter(s) IntraMuscular once  insulin glargine Injectable (LANTUS) 10 Unit(s) SubCutaneous at bedtime  insulin lispro (ADMELOG) corrective regimen sliding scale   SubCutaneous at bedtime  insulin lispro (ADMELOG) corrective regimen sliding scale   SubCutaneous three times a day before meals  insulin lispro Injectable (ADMELOG) 3 Unit(s) SubCutaneous three times a day before meals  metoprolol succinate ER 12.5 milliGRAM(s) Oral two times a day  pantoprazole  Injectable 40 milliGRAM(s) IV Push two times a day  vancomycin  IVPB 1000 milliGRAM(s) IV Intermittent every 24 hours  vancomycin  IVPB        MEDICATIONS  (PRN):  acetaminophen     Tablet .. 650 milliGRAM(s) Oral every 6 hours PRN Temp greater or equal to 38C (100.4F), Mild Pain (1 - 3)  albuterol    0.083% 2.5 milliGRAM(s) Nebulizer every 6 hours PRN Shortness of Breath and/or Wheezing  dextrose Oral Gel 15 Gram(s) Oral once PRN Blood Glucose LESS THAN 70 milliGRAM(s)/deciliter      Vitals:  T(F): 98.2 (10-02-23 @ 10:45), Max: 101.6 (10-02-23 @ 00:48)  HR: 109 (10-02-23 @ 10:45) (98 - 114)  BP: 104/69 (10-02-23 @ 10:45) (95/68 - 110/73)  RR: 18 (10-02-23 @ 10:45) (18 - 19)  SpO2: 98% (10-02-23 @ 10:45) (92% - 98%)  Wt(kg): --    10-01 @ 07:01  -  10-02 @ 07:00  --------------------------------------------------------  IN:  Total IN: 0 mL    OUT:    Incontinent per Condom Catheter (mL): 400 mL  Total OUT: 400 mL    Total NET: -400 mL              PHYSICAL EXAM:  General: Awake, responsive  CV: S1 S2 RRR  Lungs: CTABL  GI: Soft, BS +, ND, NT  Extremities: No edema    TELEMETRY: sinus 120s  	    LABS:	 	    CARDIAC MARKERS:          02 Oct 2023 08:29    134    |  98     |  32     ----------------------------<  238    4.1     |  30     |  1.73   01 Oct 2023 07:20    134    |  100    |  34     ----------------------------<  243    4.0     |  29     |  1.76   30 Sep 2023 06:35    134    |  101    |  36     ----------------------------<  288    4.3     |  25     |  1.84     Ca    8.2        02 Oct 2023 08:29  Phos  3.2       02 Oct 2023 08:29  Mg     2.2       02 Oct 2023 08:29    TPro  6.3    /  Alb  2.2    /  TBili  0.8    /  DBili  x      /  AST  42     /  ALT  85     /  AlkPhos  57     02 Oct 2023 08:29                          13.8   11.37 )-----------( 305      ( 02 Oct 2023 08:29 )             42.8 ,                       13.9   11.71 )-----------( 306      ( 01 Oct 2023 07:20 )             42.8 ,                       14.8   11.01 )-----------( 322      ( 30 Sep 2023 06:35 )             46.7   proBNP:   Lipid Profile:   HgA1c:   TSH: Thyroid Stimulating Hormone, Serum: 0.633 uIU/mL (10-01 @ 07:20)

## 2023-10-02 NOTE — PROGRESS NOTE ADULT - SUBJECTIVE AND OBJECTIVE BOX
BRANDEN MARRUFO  MRN-[id number]    Follow Up:  Community acquired pna     Interval History: the pt was seen and examined earlier, no acute distress, pt is awake and alert, pt's wife is present. Pt denies being short of breath, on supplemental O2 via NC, having occasional cough, no chills, had fever last night, no chest pain, no abd pain, no N/V, no diarrhea, no urinary symptoms.     PAST MEDICAL & SURGICAL HISTORY:  CVA (cerebral vascular accident)      HTN (hypertension)      DM (diabetes mellitus)      HTN (hypertension)      CVA (cerebrovascular accident)      HTN (hypertension)      DM (diabetes mellitus)      HTN (hypertension)      S/P hernia repair  right inguinal hernia 1992      S/P cystoscopy  2012          ROS:    [ ] Unobtainable because:  [x ] All other systems negative    Constitutional: no fever, no chills  Head: no trauma  Eyes: no vision changes, no eye pain  ENT:  no sore throat, no rhinorrhea  Cardiovascular:  no chest pain, no palpitation  Respiratory:  no SOB with supplemental O2, occasional cough  GI:  no abd pain, no vomiting, no diarrhea  urinary: no dysuria, no hematuria, no flank pain  musculoskeletal:  no joint pain, no joint swelling  skin:  no rash  neurology:  no headache, no seizure, no change in mental status  psych: no anxiety, no depression         Allergies  No Known Allergies        ANTIMICROBIALS:  cefepime   IVPB    cefepime   IVPB 2000 every 12 hours      OTHER MEDS:  acetaminophen     Tablet .. 650 milliGRAM(s) Oral every 6 hours PRN  albuterol    0.083% 2.5 milliGRAM(s) Nebulizer every 6 hours PRN  aspirin enteric coated 81 milliGRAM(s) Oral daily  atorvastatin 80 milliGRAM(s) Oral at bedtime  buMETAnide Injectable 2 milliGRAM(s) IV Push two times a day  chlorhexidine 2% Cloths 1 Application(s) Topical <User Schedule>  clopidogrel Tablet 75 milliGRAM(s) Oral daily  dextrose Oral Gel 15 Gram(s) Oral once PRN  enoxaparin Injectable 40 milliGRAM(s) SubCutaneous every 24 hours  influenza   Vaccine 0.5 milliLiter(s) IntraMuscular once  insulin glargine Injectable (LANTUS) 10 Unit(s) SubCutaneous at bedtime  insulin lispro (ADMELOG) corrective regimen sliding scale   SubCutaneous at bedtime  insulin lispro (ADMELOG) corrective regimen sliding scale   SubCutaneous three times a day before meals  insulin lispro Injectable (ADMELOG) 3 Unit(s) SubCutaneous three times a day before meals  metoprolol succinate ER 12.5 milliGRAM(s) Oral two times a day  pantoprazole  Injectable 40 milliGRAM(s) IV Push two times a day      Vital Signs Last 24 Hrs  T(C): 38.4 (02 Oct 2023 15:50), Max: 38.7 (02 Oct 2023 00:48)  T(F): 101.2 (02 Oct 2023 15:50), Max: 101.6 (02 Oct 2023 00:48)  HR: 111 (02 Oct 2023 16:25) (98 - 111)  BP: 108/70 (02 Oct 2023 16:25) (95/68 - 110/73)  BP(mean): --  RR: 19 (02 Oct 2023 16:25) (18 - 19)  SpO2: 97% (02 Oct 2023 16:25) (92% - 98%)    Parameters below as of 02 Oct 2023 16:25  Patient On (Oxygen Delivery Method): nasal cannula  O2 Flow (L/min): 3      Physical Exam:  General: Nontoxic-appearing Male in no acute distress. NC  HEENT: AT/NC. Anicteric. Conjunctiva pink and moist. Oropharynx clear.  Neck: Not rigid. No sense of mass.  Nodes: None palpable.  Chest: AICD site benign  Lungs: Diminished BS Bilaterally, no wheezes, no rhonchi   Heart: Regular rate and rhythm. No Murmur. No rub. No gallop. No palpable thrill.  Abdomen: Bowel sounds present and normoactive. Soft. Nondistended. Nontender. No sense of mass. No organomegaly.  Back: No spinal tenderness. No costovertebral angle tenderness.   Extremities: No cyanosis or clubbing. No edema.   Skin: Warm. Dry. Good turgor. No rash. No vasculitic stigmata.  Psychiatric: Appropriate affect and mood for situation.     WBC Count: 11.37 K/uL (10-02 @ 08:29)  WBC Count: 11.71 K/uL (10-01 @ 07:20)  WBC Count: 11.01 K/uL (09-30 @ 06:35)  WBC Count: 10.85 K/uL (09-29 @ 07:00)  WBC Count: 12.52 K/uL (09-28 @ 06:40)  WBC Count: 12.86 K/uL (09-27 @ 08:30)  WBC Count: 15.57 K/uL (09-27 @ 02:40)                            13.8   11.37 )-----------( 305      ( 02 Oct 2023 08:29 )             42.8       10-02    134<L>  |  98  |  32<H>  ----------------------------<  238<H>  4.1   |  30  |  1.73<H>    Ca    8.2<L>      02 Oct 2023 08:29  Phos  3.2     10-02  Mg     2.2     10-02    TPro  6.3  /  Alb  2.2<L>  /  TBili  0.8  /  DBili  x   /  AST  42<H>  /  ALT  85<H>  /  AlkPhos  57  10-02      Urinalysis Basic - ( 02 Oct 2023 08:29 )    Color: x / Appearance: x / SG: x / pH: x  Gluc: 238 mg/dL / Ketone: x  / Bili: x / Urobili: x   Blood: x / Protein: x / Nitrite: x   Leuk Esterase: x / RBC: x / WBC x   Sq Epi: x / Non Sq Epi: x / Bacteria: x        Creatinine Trend: 1.73<--, 1.76<--, 1.84<--, 1.80<--, 1.91<--, 1.90<--  Lactate, Blood: 1.2 mmol/L (10-01-23 @ 07:20)      MICROBIOLOGY:  v  Clean Catch Clean Catch (Midstream)  09-30-23   No growth  --  --      .Blood Blood  09-30-23   No growth at 24 hours  --  --      .Blood Blood  09-30-23   No growth at 24 hours  --  --      .Sputum Sputum  09-28-23   Rare Pseudomonas aeruginosa  Normal Respiratory Tamela present  --  Pseudomonas aeruginosa      .Blood Blood  09-27-23   No growth at 4 days  --  --      .Blood Blood  09-27-23   No growth at 4 days  --  --      .Blood Blood  09-27-23   No growth at 5 days  --  --      .Blood Blood  09-26-23   No growth at 5 days  --  --      Clean Catch Clean Catch (Midstream)  09-26-23   <10,000 CFU/mL Normal Urogenital Tamela  --  --          Rapid RVP Result: Detected (09-28 @ 12:40)  Rapid RVP Result: Detected (09-26 @ 00:39)        C-Reactive Protein, Serum: 29 (10-01)  C-Reactive Protein, Serum: 95 (09-26)          Procalcitonin, Serum: 0.34 (09-30-23 @ 14:05)  Procalcitonin, Serum: 0.54 (09-29-23 @ 03:55)  Procalcitonin, Serum: 0.53 (09-26-23 @ 07:55)    SARS-CoV-2: NotDetec (09-28-23 @ 12:40)  Rapid RVP Result: Detected (09-28-23 @ 12:40)    SARS-CoV-2: NotDetec (28 Sep 2023 12:40)  SARS-CoV-2: NotDetec (26 Sep 2023 00:39)    RADIOLOGY:

## 2023-10-02 NOTE — PROGRESS NOTE ADULT - SUBJECTIVE AND OBJECTIVE BOX
Patient is a 58y old  Male who presents with a chief complaint of CHF Exacerbation (02 Oct 2023 20:40)      Interval History: Infection driven hyperglycemia.  Currently on 10 units of Lantus and 3 units of prandial lispro.  Earlier fingersticks were in the 300s but now they are <200.  Decreasing glucose toxicit   low-grade fever persist, T3 is low at 69AndFree thyroxine is 2.0    MEDICATIONS  (STANDING):  aspirin enteric coated 81 milliGRAM(s) Oral daily  atorvastatin 80 milliGRAM(s) Oral at bedtime  buMETAnide Injectable 2 milliGRAM(s) IV Push two times a day  cefepime   IVPB 2000 milliGRAM(s) IV Intermittent every 12 hours  cefepime   IVPB      chlorhexidine 2% Cloths 1 Application(s) Topical <User Schedule>  clopidogrel Tablet 75 milliGRAM(s) Oral daily  enoxaparin Injectable 40 milliGRAM(s) SubCutaneous every 24 hours  influenza   Vaccine 0.5 milliLiter(s) IntraMuscular once  insulin glargine Injectable (LANTUS) 10 Unit(s) SubCutaneous at bedtime  insulin lispro (ADMELOG) corrective regimen sliding scale   SubCutaneous three times a day before meals  insulin lispro (ADMELOG) corrective regimen sliding scale   SubCutaneous at bedtime  insulin lispro Injectable (ADMELOG) 3 Unit(s) SubCutaneous three times a day before meals  metoprolol succinate ER 12.5 milliGRAM(s) Oral two times a day  pantoprazole  Injectable 40 milliGRAM(s) IV Push two times a day    MEDICATIONS  (PRN):  acetaminophen     Tablet .. 650 milliGRAM(s) Oral every 6 hours PRN Temp greater or equal to 38C (100.4F), Mild Pain (1 - 3)  albuterol    0.083% 2.5 milliGRAM(s) Nebulizer every 6 hours PRN Shortness of Breath and/or Wheezing  dextrose Oral Gel 15 Gram(s) Oral once PRN Blood Glucose LESS THAN 70 milliGRAM(s)/deciliter      Allergies    No Known Allergies    Intolerances        REVIEW OF SYSTEMS:  CONSTITUTIONAL: no changes  EYES: No eye pain, visual disturbances, or discharge  ENMT:  No difficulty hearing, No sinus or throat pain  NECK: No pain or stiffness  RESPIRATORY: No cough, wheezing, chills or hemoptysis; No shortness of breath  CARDIOVASCULAR: No chest pain, palpitations or leg swelling  GASTROINTESTINAL: No abdominal or epigastric pain. No nausea, vomiting, or hematemesis; No diarrhea or constipation. No melena or hematochezia.  GENITOURINARY: No dysuria, frequency, hematuria, or incontinence  NEUROLOGICAL: No headaches, memory loss, loss of strength, numbness, or tremors  SKIN: No itching, burning, rashes, or lesions   ENDOCRINE: No heat or cold intolerance; No hair loss  MUSCULOSKELETAL: No joint pain or swelling; No muscle, back, or extremity pain  PSYCHIATRIC: No depression, anxiety, mood swings, or difficulty sleeping  HEME/LYMPH: No easy bruising, or bleeding gums  ALLERY AND IMMUNOLOGIC: No hives or eczema    Vital Signs Last 24 Hrs  T(C): 37.9 (02 Oct 2023 18:24), Max: 38.7 (02 Oct 2023 00:48)  T(F): 100.2 (02 Oct 2023 18:24), Max: 101.6 (02 Oct 2023 00:48)  HR: 111 (02 Oct 2023 16:25) (98 - 111)  BP: 108/70 (02 Oct 2023 16:25) (95/68 - 110/73)  BP(mean): --  RR: 19 (02 Oct 2023 16:25) (18 - 19)  SpO2: 97% (02 Oct 2023 16:25) (92% - 98%)    Parameters below as of 02 Oct 2023 16:25  Patient On (Oxygen Delivery Method): nasal cannula  O2 Flow (L/min): 3      PHYSICAL EXAM:  GENERAL:   HEAD: Atraumatic, Normocephalic  EYES: PERRLA, conjunctiva and sclera clear  ENMT: No  exudates,; Moist mucous membranes,, No lesions  NECK: Supple, No JVD, Normal thyroid  NERVOUS SYSTEM:  Alert & Oriented,   CHEST/LUNG: Clear to auscultation bilaterally; No rales, rhonchi, wheezing, or rubs  HEART: Regular rate and rhythm; No murmurs, rubs, or gallops  ABDOMEN: Soft, Nontender, Nondistended; Bowel sounds present  EXTREMITIES:  2+ Peripheral Pulses, no edema  SKIN: No rashes or lesions    LABS:      Urinalysis Basic - ( 02 Oct 2023 08:29 )    Color: x / Appearance: x / SG: x / pH: x  Gluc: 238 mg/dL / Ketone: x  / Bili: x / Urobili: x   Blood: x / Protein: x / Nitrite: x   Leuk Esterase: x / RBC: x / WBC x   Sq Epi: x / Non Sq Epi: x / Bacteria: x      CAPILLARY BLOOD GLUCOSE      POCT Blood Glucose.: 190 mg/dL (02 Oct 2023 20:56)  POCT Blood Glucose.: 327 mg/dL (02 Oct 2023 16:30)  POCT Blood Glucose.: 320 mg/dL (02 Oct 2023 11:38)  POCT Blood Glucose.: 206 mg/dL (02 Oct 2023 08:19)    Lipid panel:           Thyroid:  Diabetes Tests:  Parathyroid Panel:  Adrenals:  RADIOLOGY & ADDITIONAL TESTS:    Imaging Personally Reviewed:  [ ] YES  [ ] NO    Consultant(s) Notes Reviewed:  [ ] YES  [ ] NO    Care Discussed with Consultants/Other Providers [ ] YES  [ ] NO

## 2023-10-02 NOTE — PROGRESS NOTE ADULT - SUBJECTIVE AND OBJECTIVE BOX
57 yo m pmhx CVA with mild left sided deficits, HTN, DM2, vertigo, HLD, Mobitz II s/pp Medtronic dual chamber ICD (12/21/22) biba from home with complaints of dyspnea and chest pain and was admitted w/ acute hypoxic respiratory failure likely due to acute pulmonary edema due to acute HFrEF in setting of acute NSTEMI, LAURA and enterovirus infection. He is lying in bed in NAD.     MEDICATIONS  (STANDING):  aspirin enteric coated 81 milliGRAM(s) Oral daily  atorvastatin 80 milliGRAM(s) Oral at bedtime  buMETAnide Injectable 2 milliGRAM(s) IV Push two times a day  cefepime   IVPB 2000 milliGRAM(s) IV Intermittent every 12 hours  cefepime   IVPB      chlorhexidine 2% Cloths 1 Application(s) Topical <User Schedule>  clopidogrel Tablet 75 milliGRAM(s) Oral daily  enoxaparin Injectable 40 milliGRAM(s) SubCutaneous every 24 hours  influenza   Vaccine 0.5 milliLiter(s) IntraMuscular once  insulin glargine Injectable (LANTUS) 10 Unit(s) SubCutaneous at bedtime  insulin lispro (ADMELOG) corrective regimen sliding scale   SubCutaneous three times a day before meals  insulin lispro (ADMELOG) corrective regimen sliding scale   SubCutaneous at bedtime  insulin lispro Injectable (ADMELOG) 3 Unit(s) SubCutaneous three times a day before meals  metoprolol succinate ER 12.5 milliGRAM(s) Oral two times a day  pantoprazole  Injectable 40 milliGRAM(s) IV Push two times a day    MEDICATIONS  (PRN):  acetaminophen     Tablet .. 650 milliGRAM(s) Oral every 6 hours PRN Temp greater or equal to 38C (100.4F), Mild Pain (1 - 3)  albuterol    0.083% 2.5 milliGRAM(s) Nebulizer every 6 hours PRN Shortness of Breath and/or Wheezing  dextrose Oral Gel 15 Gram(s) Oral once PRN Blood Glucose LESS THAN 70 milliGRAM(s)/deciliter      Allergies    No Known Allergies    Intolerances        Vital Signs Last 24 Hrs  T(C): 37.9 (02 Oct 2023 18:24), Max: 38.7 (02 Oct 2023 00:48)  T(F): 100.2 (02 Oct 2023 18:24), Max: 101.6 (02 Oct 2023 00:48)  HR: 111 (02 Oct 2023 16:25) (98 - 111)  BP: 108/70 (02 Oct 2023 16:25) (95/68 - 110/73)   RR: 19 (02 Oct 2023 16:25) (18 - 19)  SpO2: 97% (02 Oct 2023 16:25) (92% - 98%)    Parameters below as of 02 Oct 2023 16:25  Patient On (Oxygen Delivery Method): nasal cannula  O2 Flow (L/min): 3      PHYSICAL EXAM:  GENERAL: NAD, well-groomed, well-developed  HEAD:  Atraumatic, Normocephalic  EYES: EOMI, PERRLA   NECK: Supple   NERVOUS SYSTEM:  Alert & Oriented X3, Good concentration   CHEST/LUNG: Clear to auscultation bilaterally; No rales, rhonchi, wheezing, or rubs  HEART: Regular rate and rhythm; No murmurs, rubs, or gallops  ABDOMEN: Soft, Nontender, Nondistended; Bowel sounds present  EXTREMITIES: No clubbing, cyanosis, or edema      LABS:                        13.8   11.37 )-----------( 305      ( 02 Oct 2023 08:29 )             42.8     10-02    134<L>  |  98  |  32<H>  ----------------------------<  238<H>  4.1   |  30  |  1.73<H>    Ca    8.2<L>      02 Oct 2023 08:29  Phos  3.2     10-02  Mg     2.2     10-02    TPro  6.3  /  Alb  2.2<L>  /  TBili  0.8  /  DBili  x   /  AST  42<H>  /  ALT  85<H>  /  AlkPhos  57  10-02      Urinalysis Basic - ( 02 Oct 2023 08:29 )    Color: x / Appearance: x / SG: x / pH: x  Gluc: 238 mg/dL / Ketone: x  / Bili: x / Urobili: x   Blood: x / Protein: x / Nitrite: x   Leuk Esterase: x / RBC: x / WBC x   Sq Epi: x / Non Sq Epi: x / Bacteria: x      CAPILLARY BLOOD GLUCOSE      POCT Blood Glucose.: 327 mg/dL (02 Oct 2023 16:30)  POCT Blood Glucose.: 320 mg/dL (02 Oct 2023 11:38)  POCT Blood Glucose.: 206 mg/dL (02 Oct 2023 08:19)  POCT Blood Glucose.: 248 mg/dL (01 Oct 2023 21:35)      Culture - Stool (collected 01 Oct 2023 10:30)  Source: .Stool Feces  Preliminary Report (02 Oct 2023 20:00):    No enteric pathogens to date: Final culture pending    Culture - Urine (collected 30 Sep 2023 18:23)  Source: Clean Catch Clean Catch (Midstream)  Final Report (02 Oct 2023 07:35):    No growth    Culture - Blood (collected 30 Sep 2023 14:15)  Source: .Blood Blood  Preliminary Report (02 Oct 2023 19:02):    No growth at 48 Hours    Culture - Blood (collected 30 Sep 2023 14:05)  Source: .Blood Blood  Preliminary Report (02 Oct 2023 19:02):    No growth at 48 Hours    Culture - Sputum (collected 28 Sep 2023 18:18)  Source: .Sputum Sputum  Gram Stain (01 Oct 2023 22:08):    Few polymorphonuclear leukocytes per low power field    Rare Squamous epithelial cells per low power field    Few Gram positive cocci in pairs per oil power field    Few Gram Negative Rods per oil power field    Rare Gram Negative Coccobacilli per oil power field  Final Report (01 Oct 2023 22:08):    Rare Pseudomonas aeruginosa    Normal Respiratory Tamela present  Organism: Pseudomonas aeruginosa (01 Oct 2023 22:08)  Organism: Pseudomonas aeruginosa (01 Oct 2023 22:08)    Culture - Blood (collected 27 Sep 2023 22:20)  Source: .Blood Blood  Preliminary Report (02 Oct 2023 10:01):    No growth at 4 days    Culture - Blood (collected 27 Sep 2023 22:00)  Source: .Blood Blood  Preliminary Report (02 Oct 2023 10:01):    No growth at 4 days      RADIOLOGY & ADDITIONAL TESTS:    10-01-23 @ 07:01  -  10-02-23 @ 07:00  --------------------------------------------------------  IN:  Total IN: 0 mL    OUT:    Incontinent per Condom Catheter (mL): 400 mL  Total OUT: 400 mL    Total NET: -400 mL      10-02-23 @ 07:01  -  10-02-23 @ 20:41  --------------------------------------------------------  IN:    Oral Fluid: 240 mL  Total IN: 240 mL    OUT:    Indwelling Catheter - Urethral (mL): 700 mL  Total OUT: 700 mL    Total NET: -460 mL

## 2023-10-02 NOTE — CONSULT NOTE ADULT - ASSESSMENT
58M w PMHx  CAD, CVA, DM (suboptimal compliance, checks FS Q4days though a1c <7) PPM/AICD for bradycardia/Mobitz II, LAD- thyroid tissue in retrospect,  now presenting with pneumonia, CHF, NSTEMI, renal insufficiency. Patient treated for CAP, still febrile though at lease at present hyperthermia blanket could have been playing a role.   Seems out of proportion to attribute to rhinovirus/enterovirus  More extensive pneumonia has a more delayed response to therapy  No RF for resistant fredi elicited  Legionella negative  AICD site benign, cx NTD  Exam otherwise unrevealing for acute infectious process.   COVID-negative x2  Blood cx NTD  TSH not suppressed    Suggestions--  Check MRSA nares PCR  Standard precautions  Continue ceftriaxone for now  Hypo/hyperthermia blanket addressed  If fails to defervesce  ·	May need repeat Imaging of chest  ·	Stop ceftriaxone  ·	Vanco/cefepime  ·	HIV test  ·	ESR HAIDER ANCA RF    I will be available via Teams for any issues that may arise over the weekend.    Khoa Cornejo MD  Attending Physician  [facility]  Division of Infectious Diseases  [phone]  
Abnormal thyroid function tests 
58M with urinary retention, francis placed 9/30  No noticeable change in renal function, CR ~1.76    continue francis  If pt remains in hospital can attempt TOV,   otherwise follow up with urology OP with leg bag  UA, Ucx  discussed with Dr Perez
58M HTN, HLD, DM, CVA with residual L-sided deficits, Mobitz II s/p Medtronic AICD who presented with episodes of chest pain explained as heartburn that progressed to worsening SOB. Pt found to have LAURA, lactate, and hypoxic that worsened with IVF, subsequently placed on bilevel.    Concern for CHF related to possible missed MI vs. mechanical complication vs. myocarditis vs. stress myopathy  Pt with hemoconcentration on CBC and narrow pulse pressure suggestive of increased vasoconstriction. unclear etiology of acidosis; may be multifactorial  Fair UOP with IV furosemide  No further chest pain symptoms, which started >48 hours ago    -check TTE with eval for mechanical complications of MI, systolic function  -trend troponins  -hep gtt, DAPT for possible ACS  -cont aggressive diuresis with bumex gtt  -trial of hydralazine for vasodilation. alternatively can also consider NTG gtt for venodilation  -would allow tachycardia as this is likely compensatory  -respiratory status precludes coronary eval, however if does not clinically improve will need to consider intubation and coronary eval/pulmonary artery catheterization with consideration of mechanical circulatory support    If does not significantly improve the pt will need to be transferred to [facility] CCU for further management

## 2023-10-02 NOTE — CONSULT NOTE ADULT - SUBJECTIVE AND OBJECTIVE BOX
Patient is a 58y old  Male who presents with a chief complaint of CHF Exacerbation (01 Oct 2023 20:06)      Reason For Consult:  Abnormal thyroid function tests     HPI:  57 yo m pmhx CVA with mild left sided deficits, HTN, DM2, vertigo, HLD, Mobitz II s/pp Medtronic dual chamber ICD (12/21/22) biba from home with complaints of sob/chest pain.  Per patient's wife cp began yesterday however persistent today and with associated worsening sob.  In ED patient tachypneic, tachycardic.  Labs significant for Trop 6791.6, Cr 1.42, bnp and lactate 2.9.  Patient given 2L IVF, lactate worsening, rapidly worsening sob and hypoxia.  Patient seen at bedside, on NRB with spo2 88-92% tachypenic, appears uncomfortable.  Coarse b/l.  Bedside POCUS exam performed, diffuse b lines, ivf plump, difficultly visualizing chambers however overall function appears decreased based off his previous tte.  Patient ordered for Bipap and lasix 80mg.  Stat TTE ordered.  Admit to ICU.      Patient is not on any thyroid medications at home.  Comes in with CHF also febrile.  TSH is 0.633 Free thyroxine is 2.0 increased total T4 is at 10.3 and T3 is low at 69      PAST MEDICAL & SURGICAL HISTORY:  CVA (cerebral vascular accident)      HTN (hypertension)      DM (diabetes mellitus)      HTN (hypertension)      CVA (cerebrovascular accident)      HTN (hypertension)      DM (diabetes mellitus)      HTN (hypertension)      S/P hernia repair  right inguinal hernia 1992      S/P cystoscopy  2012          FAMILY HISTORY:        Social History:    MEDICATIONS  (STANDING):  aspirin enteric coated 81 milliGRAM(s) Oral daily  atorvastatin 80 milliGRAM(s) Oral at bedtime  buMETAnide Injectable 2 milliGRAM(s) IV Push two times a day  cefepime   IVPB 2000 milliGRAM(s) IV Intermittent every 12 hours  cefepime   IVPB      chlorhexidine 2% Cloths 1 Application(s) Topical <User Schedule>  clopidogrel Tablet 75 milliGRAM(s) Oral daily  enoxaparin Injectable 40 milliGRAM(s) SubCutaneous every 24 hours  influenza   Vaccine 0.5 milliLiter(s) IntraMuscular once  insulin glargine Injectable (LANTUS) 10 Unit(s) SubCutaneous at bedtime  insulin lispro (ADMELOG) corrective regimen sliding scale   SubCutaneous three times a day before meals  insulin lispro (ADMELOG) corrective regimen sliding scale   SubCutaneous at bedtime  insulin lispro Injectable (ADMELOG) 3 Unit(s) SubCutaneous three times a day before meals  metoprolol succinate ER 12.5 milliGRAM(s) Oral two times a day  pantoprazole  Injectable 40 milliGRAM(s) IV Push two times a day  vancomycin  IVPB 1000 milliGRAM(s) IV Intermittent every 24 hours  vancomycin  IVPB        MEDICATIONS  (PRN):  acetaminophen     Tablet .. 650 milliGRAM(s) Oral every 6 hours PRN Temp greater or equal to 38C (100.4F), Mild Pain (1 - 3)  albuterol    0.083% 2.5 milliGRAM(s) Nebulizer every 6 hours PRN Shortness of Breath and/or Wheezing  dextrose Oral Gel 15 Gram(s) Oral once PRN Blood Glucose LESS THAN 70 milliGRAM(s)/deciliter      REVIEW OF SYSTEMS:  CONSTITUTIONAL:  as per HPI  HEENT:  Eyes:  No diplopia or blurred vision.   ENT:  No earache, sore throat or runny nose.  CARDIOVASCULAR:  No chest pain .  RESPIRATORY:  No cough, shortness of breath, PND or orthopnea.  GASTROINTESTINAL:  No nausea, vomiting or diarrhea.  GENITOURINARY:  No dysuria, frequency or urgency. No Blood in urine  MUSCULOSKELETAL:  no joint aches, no muscle pain, myalgia  SKIN:  No change in skin, hair or nails.  NEUROLOGIC:  No paresthesias, fasciculations, seizures or weakness.  PSYCHIATRIC:  No disorder of thought or mood.  ENDOCRINE:  No heat or cold intolerance, polyuria or polydipsia. abnormal weight gain or loss, oral thrush  HEMATOLOGICAL:  No easy bruising or bleeding.     T(C): 37.2 (10-01-23 @ 16:40), Max: 38.7 (10-01-23 @ 00:44)  HR: 114 (10-01-23 @ 16:40) (54 - 123)  BP: 107/74 (10-01-23 @ 16:40) (107/74 - 116/73)  RR: 19 (10-01-23 @ 16:40) (18 - 19)  SpO2: 97% (10-01-23 @ 16:40) (87% - 97%)  Wt(kg): --    PHYSICAL EXAM:  GENERAL: NAD, well-groomed, well-developed  HEAD:  Atraumatic, Normocephalic  EYES: PERRLA, conjunctiva and sclera clear  ENMT: No  exudates,, Moist mucous membranes,, No lesions  NECK: Supple, No JVD,   NERVOUS SYSTEM:  Alert & Oriented   CHEST/LUNG: Clear to percussion bilaterally; No rales, rhonchi, wheezing, or rubs  HEART: Regular rate and rhythm; No murmurs, rubs, or gallops  ABDOMEN: Soft, Nontender, Nondistended; Bowel sounds present  EXTREMITIES:  2+ Peripheral Pulses, No clubbing, cyanosis, or edema  LYMPH: No lymphadenopathy noted  SKIN: No rashes or lesions    CAPILLARY BLOOD GLUCOSE      POCT Blood Glucose.: 248 mg/dL (01 Oct 2023 21:35)  POCT Blood Glucose.: 315 mg/dL (01 Oct 2023 20:26)  POCT Blood Glucose.: 237 mg/dL (01 Oct 2023 17:01)  POCT Blood Glucose.: 303 mg/dL (01 Oct 2023 12:08)  POCT Blood Glucose.: 251 mg/dL (01 Oct 2023 07:43)                            13.9   11.71 )-----------( 306      ( 01 Oct 2023 07:20 )             42.8       CMP:  10-01 @ 07:20  SGPT 72  Albumin 2.4   Alk Phos 57   Anion Gap 5   SGOT 47   Total Bili 0.7   BUN 34   Calcium Total 8.3   CO2 29   Chloride 100   Creatinine 1.76   eGFR if AA --   eGFR if non AA --   Glucose 243   Potassium 4.0   Protein 6.3   Sodium 134      Thyroid Function Tests:  10-01 @ 07:20 TSH 0.633 FreeT4 1.7 T3 -- Anti TPO -- Anti Thyroglobulin Ab -- TSI --      Diabetes Tests:     Parathyroids:     Adrenals:       Radiology:             
Cardiology Initial Consult    [facility] [place]  [address] [place] [address], [place] [address]  Office: [phone]  Fax: [phone]    CHIEF COMPLAINT:  SOB    HISTORY OF PRESENT ILLNESS:  Primary cardiologist: Dr. Edinson Foote  58M HTN, HLD, DM, CVA with residual L-sided deficits, Mobitz II s/p Medtronic AICD who presented with episodes of chest pain explained as heartburn that progressed to worsening SOB. Pt found to have LAURA, lactate, and hypoxic that worsened with IVF, subsequently placed on bilevel.    Pt persistently tachycardic without evidence of PE. Enterovirus positive. Has persistent acidosis of unclear etiology.     AICD was placed due to concern for cardiac sarcoid; subsequent outpt testing negative.    Allergies  No Known Allergies  Intolerances      MEDICATIONS:  buMETAnide Infusion 2 mG/Hr IV Continuous <Continuous>  heparin  Infusion.  Unit(s)/Hr IV Continuous <Continuous>  labetalol Injectable 10 milliGRAM(s) IV Push every 4 hours PRN  azithromycin  IVPB      cefTRIAXone   IVPB 1000 milliGRAM(s) IV Intermittent every 24 hours  albuterol    0.083% 2.5 milliGRAM(s) Nebulizer every 6 hours PRN  pantoprazole  Injectable 40 milliGRAM(s) IV Push two times a day  atorvastatin 80 milliGRAM(s) Oral at bedtime  insulin lispro (ADMELOG) corrective regimen sliding scale   SubCutaneous every 4 hours  chlorhexidine 2% Cloths 1 Application(s) Topical <User Schedule>  influenza   Vaccine 0.5 milliLiter(s) IntraMuscular once    PAST MEDICAL & SURGICAL HISTORY:  CVA (cerebral vascular accident)  HTN (hypertension)  DM (diabetes mellitus)  HTN (hypertension)  CVA (cerebrovascular accident)  HTN (hypertension)  DM (diabetes mellitus)  HTN (hypertension)  S/P hernia repair  right inguinal hernia 1992  S/P cystoscopy  2012      FAMILY HISTORY:    SOCIAL HISTORY:    [x] Non-smoker  [ ] Smoker  [ ] Alcohol    Review of Systems:  Constitutional: [-] Fever [ ] Chills [ ] Fatigue [ ] Weight change   HEENT: [ ] Blurred vision [ ] Eye pain [ -] Headache [ ] Runny nose [ ] Sore throat   Respiratory: [ ] Cough [ ] Wheezing [ x] Shortness of breath  Cardiovascular: [x ] Chest Pain [- ] Palpitations [ x] LOVE [ ] PND [ x] Orthopnea  Gastrointestinal: [- ] Abdominal Pain [ ] Diarrhea [ ] Constipation [ ] Hemorrhoids [ ] Nausea [ ] Vomiting  Genitourinary: [ ] Nocturia [ -] Dysuria [ ] Incontinence  Extremities: [- ] Swelling [ ] Joint Pain  Neurologic: [ ] Focal deficit [ ] Paresthesias [ -] Syncope  Skin: [- ] Rash [ ] Ecchymoses [ ] Wounds [ ] Lesions  Psychiatry: [- ] Depression [ ] Suicidal/Homicidal ideation [ ] Anxiety [ ] Sleep disturbances  [ x] 10 point review of systems is otherwise negative except as mentioned above            [ ]Unable to obtain    PHYSICAL EXAM:  T(C): 37.7 (09-26-23 @ 08:43), Max: 37.7 (09-26-23 @ 08:40)  HR: 127 (09-26-23 @ 11:00) (93 - 127)  BP: 118/91 (09-26-23 @ 11:00) (108/73 - 155/93)  RR: 35 (09-26-23 @ 11:00) (18 - 37)  SpO2: 99% (09-26-23 @ 11:00) (87% - 113%)  Wt(kg): --  I&O's Summary    26 Sep 2023 07:01  -  26 Sep 2023 11:48  --------------------------------------------------------  IN: 52 mL / OUT: 800 mL / NET: -748 mL    Appearance: mildly uncomfortable  HEENT:   bilevel present  Cardiovascular: Normal S1 S2, +elevated JVP, no murmurs, trace edema  Respiratory: diffuse crackles  Psychiatry:Mood & affect appropriate  Gastrointestinal:  soft nt  Skin: No rashes, no ecchymoses, no cyanosis	  Vascular: Peripheral pulses palpable bilaterally    LABS:	 	  CBC Full  -  ( 26 Sep 2023 07:55 )  WBC Count : 16.71 K/uL  Hemoglobin : 17.1 g/dL  Hematocrit : 56.6 %  Platelet Count - Automated : 340 K/uL  Mean Cell Volume : 82.4 fl  Mean Cell Hemoglobin : 24.9 pg  Mean Cell Hemoglobin Concentration : 30.2 g/dL  Auto Neutrophil # : 14.67 K/uL  Auto Lymphocyte # : 0.95 K/uL  Auto Monocyte # : 0.98 K/uL  Auto Eosinophil # : 0.00 K/uL  Auto Basophil # : 0.02 K/uL  Auto Neutrophil % : 87.8 %  Auto Lymphocyte % : 5.7 %  Auto Monocyte % : 5.9 %  Auto Eosinophil % : 0.0 %  Auto Basophil % : 0.1 %    09-26    137  |  107  |  24<H>  ----------------------------<  301<H>  4.9   |  14<L>  |  1.61<H>    09-26  140  |  109<H>  |  20  ----------------------------<  213<H>  5.0   |  18<L>  |  1.42<H>    Ca    8.4<L>      26 Sep 2023 07:55  Ca    8.8      26 Sep 2023 00:28  Phos  4.7     09-26  Mg     2.3     09-26  Mg     2.1     09-26    TPro  7.6  /  Alb  2.8<L>  /  TBili  0.5  /  DBili  x   /  AST  113<H>  /  ALT  42  /  AlkPhos  78  09-26  TPro  7.6  /  Alb  3.1<L>  /  TBili  0.6  /  DBili  x   /  AST  114<H>  /  ALT  33  /  AlkPhos  59  09-26    proBNP:   Lipid Profile:   HgA1c:   TSH:     CARDIAC MARKERS:  Troponin I, High Sensitivity Result: 8374.4 ng/L (09-26-23 @ 07:55)  Troponin I, High Sensitivity Result: 6517.4 ng/L (09-26-23 @ 01:30)    TELEMETRY: 	As- 120-130    ECG:  	As-, negative Sgarbossa criteria  RADIOLOGY:  OTHER: 	    PREVIOUS DIAGNOSTIC TESTING:    [x] Echocardiogram: < from: Transthoracic Echocardiogram (12.20.22 @ 12:56) >  Conclusions:  1. Mitral annular calcification and calcified mitral  leaflets with normal diastolic opening. Mild mitral  regurgitation.  2. Calcified trileaflet aortic valve with normal opening.  No aortic valve regurgitation seen.  3. Normal left ventricular systolic function. No segmental  wall motion abnormalities.  4. Normal diastolic function.  5. Normal right ventricular size and function.  *** No previous Echo exam.    < end of copied text >    [ ] Catheterization:  [ ] Stress Test:  	
[facility]  Division of Infectious Diseases  [phone]    NIRU, OTERO  58y, Male  [id number]    HPI--  Asked to assess patient for pneumonia. As per HPI:  59 yo m pmhx CVA with mild left sided deficits, HTN, DM2, vertigo, HLD, Mobitz II s/pp Medtronic dual chamber ICD (12/21/22) biba from home with complaints of sob/chest pain.  Per patient's wife cp began yesterday however persistent today and with associated worsening sob.  In ED patient tachypneic, tachycardic.  Labs significant for Trop 6791.6, Cr 1.42, bnp and lactate 2.9.  Patient given 2L IVF, lactate worsening, rapidly worsening sob and hypoxia.  Patient seen at bedside, on NRB with spo2 88-92% tachypenic, appears uncomfortable.  Coarse b/l.  Bedside POCUS exam performed, diffuse b lines, ivf plump, difficultly visualizing chambers however overall function appears decreased based off his previous tte.  Patient ordered for Bipap and lasix 80mg.  Stat TTE ordered.  Admit to ICU.     (26 Sep 2023 06:34)    Patient/wife state patient at [facility] until 1-2 days PTA when developed chest discomfot and SOP. Patietn went to urgent cere, no interventions per patient report, subsequently came to ED. Here noted to have hypoxemia, with multifocal pneumoni + CHF on CT, decreased EF on ECHO, +RVP for rhinovirus/enterovirus, and thus far negative cx data. Still febrile though 'cooling' blanket water set to 95F (rectified).   +Cough, dry. No hemoptysis. _+SOB. +CP. No N/V. Dec PO intake. + fevers + chills    PMH/PSH--  CVA (cerebral vascular accident)    HTN (hypertension)    DM (diabetes mellitus)    HTN (hypertension)    CVA (cerebrovascular accident)    HTN (hypertension)    DM (diabetes mellitus)    HTN (hypertension)    No significant past surgical history    S/P hernia repair    S/P cystoscopy        Allergies--  No Known Allergies      Medications--  Antibiotics:   Immunologic: influenza   Vaccine 0.5 milliLiter(s) IntraMuscular once    Other: acetaminophen     Tablet .. PRN  albuterol    0.083% PRN  aspirin enteric coated  atorvastatin  buMETAnide Injectable  chlorhexidine 2% Cloths  clopidogrel Tablet  dextrose Oral Gel PRN  hydrALAZINE Injectable  insulin glargine Injectable (LANTUS)  insulin lispro (ADMELOG) corrective regimen sliding scale  insulin lispro (ADMELOG) corrective regimen sliding scale  insulin lispro Injectable (ADMELOG)  pantoprazole  Injectable    Antimicrobials last 90 days per EMR: MEDICATIONS  (STANDING):  azithromycin  IVPB   255 mL/Hr IV Intermittent (09-26-23 @ 10:27)    cefTRIAXone   IVPB   100 mL/Hr IV Intermittent (09-29-23 @ 07:15)   100 mL/Hr IV Intermittent (09-28-23 @ 06:50)   100 mL/Hr IV Intermittent (09-27-23 @ 05:55)   100 mL/Hr IV Intermittent (09-26-23 @ 06:45)        Social History--  EtOH: denies   Tobacco: denies   Drug Use: denies     Family/Marital History--    DM  Lung Ca        Travel/Environmental/Occupational History:  Originally from [place]  No recent travel  No pets  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     Pertinent positives and negatives--  Review of systems otherwise negative except as previously noted.    Physical Exam--  Vital Signs: T(F): 102.6 (09-29-23 @ 13:30), Max: 103 (09-29-23 @ 12:45)  HR: 119 (09-29-23 @ 14:00)  BP: 94/67 (09-29-23 @ 14:00)  RR: 30 (09-29-23 @ 14:00)  SpO2: 94% (09-29-23 @ 14:00)  Wt(kg): --  General: Nontoxic-appearing Male in no acute distress.  HEENT: AT/NC. Anicteric. Conjunctiva pink and moist. Oropharynx clear.  Neck: Not rigid. No sense of mass.  Nodes: None palpable.  Chest: AICD site benign  Lungs: Diminished BS B  Heart: Regular rate and rhythm. No Murmur. No rub. No gallop. No palpable thrill.  Abdomen: Bowel sounds present and normoactive. Soft. Nondistended. Nontender. No sense of mass. No organomegaly.  Back: No spinal tenderness. No costovertebral angle tenderness.   Extremities: No cyanosis or clubbing. No edema.   Skin: Warm. Dry. Good turgor. No rash. No vasculitic stigmata.  Psychiatric: Appropriate affect and mood for situation.       Laboratory & Imaging Data--  CBC                        13.9   10.85 )-----------( 293      ( 29 Sep 2023 07:00 )             44.0       Chemistries  09-29    136  |  101  |  39<H>  ----------------------------<  244<H>  3.7   |  28  |  1.80<H>    Ca    8.2<L>      29 Sep 2023 03:55  Phos  3.3     09-29  Mg     2.2     09-29    TPro  6.1  /  Alb  2.2<L>  /  TBili  0.7  /  DBili  x   /  AST  48<H>  /  ALT  40  /  AlkPhos  57  09-29      Culture Data    Culture - Sputum (collected 28 Sep 2023 18:18)  Source: .Sputum Sputum  Gram Stain (29 Sep 2023 07:36):    Few polymorphonuclear leukocytes per low power field    Rare Squamous epithelial cells per low power field    Few Gram positive cocci in pairs per oil power field    Few Gram Negative Rods per oil power field    Rare Gram Negative Coccobacilli per oil power field    Culture - Blood (collected 27 Sep 2023 22:20)  Source: .Blood Blood  Preliminary Report (29 Sep 2023 10:00):    No growth at 24 hours    Culture - Blood (collected 27 Sep 2023 22:00)  Source: .Blood Blood  Preliminary Report (29 Sep 2023 10:00):    No growth at 24 hours    Culture - Blood (collected 27 Sep 2023 02:40)  Source: .Blood Blood  Preliminary Report (29 Sep 2023 09:01):    No growth at 48 Hours    Culture - Blood (collected 26 Sep 2023 07:55)  Source: .Blood Blood  Preliminary Report (29 Sep 2023 11:00):    No growth at 72 Hours    Culture - Urine (collected 26 Sep 2023 05:56)  Source: Clean Catch Clean Catch (Midstream)  Final Report (27 Sep 2023 07:10):    <10,000 CFU/mL Normal Urogenital Tamela    < from: CT Abdomen and Pelvis w/ Oral Cont and w/ IV Cont (09.26.23 @ 02:36) >    ACC: [id number] EXAM:  CT ABDOMEN AND PELVIS OC IC   ORDERED BY: DIAMANTE REEDER     PROCEDURE DATE:  09/26/2023          INTERPRETATION:  PROCEDURE INFORMATION:  Exam: CT Abdomen And Pelvis With Contrast  Exam date and time: 9/26/2023 2:26 AM  Age: 58 years old  Clinical indication: Epigastric abd pain, tachycardia    TECHNIQUE:  Imaging protocol: Computed tomography of the abdomen and pelvis with   contrast.  Contrast material: IV: IV CONTRAST DOCUMENTED IN UNLINKED CONCURRENT EXAM;  ORAL: [id number]; Contrast route: IV;    COMPARISON:  PT PET SKULL TO THIGH SARCOID FDG 1/20/2023 3:12 PM    FINDINGS:  Lungs: Motion artifact limits evaluation. Patchy bibasilar opacities.  Pleural spaces: Small bilateral pleural effusions, right greater than   left..  Heart: Small pericardial effusion. Wire from electronic cardiac device in   the right atrium and ventricle.      Motion artifact limits evaluation of the abdomen and pelvis.  Liver: Normal. No mass.  Gallbladder and bile ducts: Normal. No calcified stones. No ductal   dilation.  Pancreas: Normal. No ductal dilation.  Spleen: Normal. No splenomegaly.  Adrenal glands: No mass.  Kidneys and ureters: Normal. No hydronephrosis. Left renal cysts.  Stomach and bowel: Possible small gastric diverticulum extending   superiorly from the body seen best on series 4 image 18 and series 2   image 36. This was present previously. A ulcer is considered less likely   although clinical correlation is suggested.. No obstruction. No mucosal   thickening.  Appendix: No evidence of appendicitis.    Intraperitoneal space: No free air.  Vasculature: No abdominal aortic aneurysm. Vascular calcifications.  Lymph nodes: No lymphadenopathy.  Urinary bladder: Unremarkable as visualized. Mild soft tissue density   with a punctate calcification between the bladder and the anterior   abdominal wall which is unchanged.  Reproductive: Unremarkable as visualized. Calcifications in the prostate   gland.  Bones/joints: No acute osseous abnormality. Degenerative changes of bone.  Soft tissues: Unremarkable.    Other findings: Examination is limited due to motion.    IMPRESSION:  1. Small probable gastric diverticulum which was seen on 12/27/2022. A   small ulcer is considered less likely although in this patient with   epigastric pain, clinical correlation will determine any need for further   evaluation.  2. bilateral patchy groundglass opacities in the lower lobes and small   bilateral pleural effusions may represent fluid overload. Please   correlate clinically.    These findings were discussed with VALERY Corado at 8:41 AM on 9/26/2023.    --- End of Report ---    EVGENY MARIE MD; Attending Radiologist  This document has been electronically signed. Sep 26 2023  8:42AM    < end of copied text >      
  Chief Complaint:  Patient is a 58y old  Male who presents with a chief complaint of CHF Exacerbation (01 Oct 2023 22:35)      HPI:  57 yo m pmhx CVA with mild left sided deficits, HTN, DM2, vertigo, HLD, Mobitz II s/pp Medtronic dual chamber ICD (22) biba from home with complaints of sob/chest pain.  Per patient's wife cp began yesterday however persistent today and with associated worsening sob.  In ED patient tachypneic, tachycardic.  Labs significant for Trop 6791.6, Cr 1.42, bnp and lactate 2.9.  Patient given 2L IVF, lactate worsening, rapidly worsening sob and hypoxia.  Patient seen at bedside, on NRB with spo2 88-92% tachypenic, appears uncomfortable.  Coarse b/l.  Bedside POCUS exam performed, diffuse b lines, ivf plump, difficultly visualizing chambers however overall function appears decreased based off his previous tte.  Patient ordered for Bipap and lasix 80mg.  Stat TTE ordered.  Admit to ICU.        Urology consulted as pt developed urinary retention and francis placed .  Pt denies any h/o difficulty voiding or BPH.  He denies any dysuria or abd, flank pain.        (26 Sep 2023 06:34)      PMH/PSH:PAST MEDICAL & SURGICAL HISTORY:  CVA (cerebral vascular accident)      HTN (hypertension)      DM (diabetes mellitus)      HTN (hypertension)      CVA (cerebrovascular accident)      HTN (hypertension)      DM (diabetes mellitus)      HTN (hypertension)      S/P hernia repair  right inguinal hernia       S/P cystoscopy            Allergies:  No Known Allergies      Medications:  acetaminophen     Tablet .. 650 milliGRAM(s) Oral every 6 hours PRN  albuterol    0.083% 2.5 milliGRAM(s) Nebulizer every 6 hours PRN  aspirin enteric coated 81 milliGRAM(s) Oral daily  atorvastatin 80 milliGRAM(s) Oral at bedtime  buMETAnide Injectable 2 milliGRAM(s) IV Push two times a day  cefepime   IVPB 2000 milliGRAM(s) IV Intermittent every 12 hours  cefepime   IVPB      chlorhexidine 2% Cloths 1 Application(s) Topical <User Schedule>  clopidogrel Tablet 75 milliGRAM(s) Oral daily  dextrose Oral Gel 15 Gram(s) Oral once PRN  enoxaparin Injectable 40 milliGRAM(s) SubCutaneous every 24 hours  influenza   Vaccine 0.5 milliLiter(s) IntraMuscular once  insulin glargine Injectable (LANTUS) 10 Unit(s) SubCutaneous at bedtime  insulin lispro (ADMELOG) corrective regimen sliding scale   SubCutaneous three times a day before meals  insulin lispro (ADMELOG) corrective regimen sliding scale   SubCutaneous at bedtime  insulin lispro Injectable (ADMELOG) 3 Unit(s) SubCutaneous three times a day before meals  metoprolol succinate ER 12.5 milliGRAM(s) Oral two times a day  pantoprazole  Injectable 40 milliGRAM(s) IV Push two times a day  vancomycin  IVPB 1000 milliGRAM(s) IV Intermittent every 24 hours  vancomycin  IVPB          REVIEW OF SYSTEMS:  All other review of systems is negative unless indicated above.    Relevant Family History:   FAMILY HISTORY:      Relevant Social History:  Denies ETOH or tobacco history    Physical Exam:    Vital Signs:  Vital Signs Last 24 Hrs  T(C): 37.4 (02 Oct 2023 04:58), Max: 38.7 (02 Oct 2023 00:48)  T(F): 99.3 (02 Oct 2023 04:58), Max: 101.6 (02 Oct 2023 00:48)  HR: 98 (02 Oct 2023 04:58) (54 - 115)  BP: 95/68 (02 Oct 2023 04:58) (95/68 - 112/71)  BP(mean): --  RR: 18 (02 Oct 2023 04:58) (18 - 19)  SpO2: 94% (02 Oct 2023 04:58) (92% - 97%)    Parameters below as of 01 Oct 2023 08:50  Patient On (Oxygen Delivery Method): nasal cannula  O2 Flow (L/min): 3    Daily     Daily Weight in k.1 (02 Oct 2023 04:58)    Constitutional: non toxic appearing  Respiratory: Normal work of breathing  Cardiac: RRR;  : uncircumcised with foresking over glans, 2 way francis in place with clear yellow output.    Skin: warm, dry and intact;  Neurologic: AAOx3;     Laboratory:                          13.9   11.71 )-----------( 306      ( 01 Oct 2023 07:20 )             42.8     10-    134<L>  |  100  |  34<H>  ----------------------------<  243<H>  4.0   |  29  |  1.76<H>    Ca    8.3<L>      01 Oct 2023 07:20  Phos  2.7     10  Mg     2.2     10-01    TPro  6.3  /  Alb  2.4<L>  /  TBili  0.7  /  DBili  x   /  AST  47<H>  /  ALT  72  /  AlkPhos  57  10-01    LIVER FUNCTIONS - ( 01 Oct 2023 07:20 )  Alb: 2.4 g/dL / Pro: 6.3 gm/dL / ALK PHOS: 57 U/L / ALT: 72 U/L / AST: 47 U/L / GGT: x           PTT - ( 30 Sep 2023 06:35 )  PTT:31.6 sec  Urinalysis Basic - ( 01 Oct 2023 07:20 )    Color: x / Appearance: x / SG: x / pH: x  Gluc: 243 mg/dL / Ketone: x  / Bili: x / Urobili: x   Blood: x / Protein: x / Nitrite: x   Leuk Esterase: x / RBC: x / WBC x   Sq Epi: x / Non Sq Epi: x / Bacteria: x

## 2023-10-02 NOTE — PROGRESS NOTE ADULT - ASSESSMENT
57 yo m pmhx CVA with mild left sided deficits, HTN, DM2, vertigo, HLD, Mobitz II s/pp Medtronic dual chamber ICD (12/21/22) biba from home with complaints of dyspnea and chest pain and was admitted w/ acute hypoxic respiratory failure likely due to acute pulmonary edema due to acute HFrEF in setting of acute NSTEMI, LAURA and enterovirus infection.     Acute hypoxic respiratory failure due to acute sys/ diastolic CHF  - CT on admission showed patchy and confluent ground-glass and consolidative opacities primarily in the bilateral upper and lower lobes c/w a combination of infection and edema and small bilateral pleural effusions  - patient has improved w/ Lasix and Bipap, now on NC  - Echo showed EF 20% w/ severely decreased global left ventricular systolic function w/ multiple left ventricular regional wall motion abnormalities & grade II diastolic dysfunction.   - c/w Bumex & add metoprolol     Elevated Trop  - likely due to MI  - trop peaked at 24,293  - as per cardio, will likely need transfer for cardiac catheterization, once infectious symptoms resolve  - c/w ASA, Plavix & Lipitor  - heparin gtt stopped in ICU    lactic acidosis  - lactate 2.9 on admission  - likely due to hypoxia      Fevers  - may be due to entero/rhino infection, but still may have bacterial infection as fevers have persisted - concern for Multifocal PNA  - s/p Ceftriaxone in ICU    - as per ID, stopped vanc stopped  - c/w Cefepime  - Sputum + on 9/29 gram+ cocci in pairs, gram -rods and gram - coccobacilli    - CXR showed new perihilar groundglass like opacities, will get 2 view CXR  - as per ID, will check HIV, ESR, HAIDER, ANCA, RF  - check CT chest, abd & pelvis    Urinary retention   - Douglass placed 9/30 after multiple straight caths  - consult urology    LAURA  - ATN vs cardiorenal  - Cr stable     HTN  - stop IV hydralazine & add metoprolol     DM  - c/w Lantus, mealtime lispro and ISS  - hgb A1C is 6.9    Possible Hyperthyroidism  - CT showed goiter  - TSH is 0.258 but free T4 is 2, will recheck and consult endo     Prophylaxis:  DVT: Lovenox  GI: Protonix     Dispo: [facility]

## 2023-10-03 LAB
ALBUMIN SERPL ELPH-MCNC: 2 G/DL — LOW (ref 3.3–5)
ALP SERPL-CCNC: 49 U/L — SIGNIFICANT CHANGE UP (ref 40–120)
ALT FLD-CCNC: 91 U/L — HIGH (ref 12–78)
ANA TITR SER: NEGATIVE — SIGNIFICANT CHANGE UP
ANION GAP SERPL CALC-SCNC: 8 MMOL/L — SIGNIFICANT CHANGE UP (ref 5–17)
AST SERPL-CCNC: 41 U/L — HIGH (ref 15–37)
BILIRUB SERPL-MCNC: 0.7 MG/DL — SIGNIFICANT CHANGE UP (ref 0.2–1.2)
BUN SERPL-MCNC: 29 MG/DL — HIGH (ref 7–23)
CALCIUM SERPL-MCNC: 8 MG/DL — LOW (ref 8.5–10.1)
CHLORIDE SERPL-SCNC: 99 MMOL/L — SIGNIFICANT CHANGE UP (ref 96–108)
CO2 SERPL-SCNC: 26 MMOL/L — SIGNIFICANT CHANGE UP (ref 22–31)
CREAT SERPL-MCNC: 1.7 MG/DL — HIGH (ref 0.5–1.3)
CULTURE RESULTS: SIGNIFICANT CHANGE UP
EGFR: 47 ML/MIN/1.73M2 — LOW
GLUCOSE BLDC GLUCOMTR-MCNC: 236 MG/DL — HIGH (ref 70–99)
GLUCOSE BLDC GLUCOMTR-MCNC: 296 MG/DL — HIGH (ref 70–99)
GLUCOSE BLDC GLUCOMTR-MCNC: 354 MG/DL — HIGH (ref 70–99)
GLUCOSE BLDC GLUCOMTR-MCNC: 361 MG/DL — HIGH (ref 70–99)
GLUCOSE SERPL-MCNC: 207 MG/DL — HIGH (ref 70–99)
HCT VFR BLD CALC: 40.7 % — SIGNIFICANT CHANGE UP (ref 39–50)
HGB BLD-MCNC: 12.9 G/DL — LOW (ref 13–17)
M PNEUMO IGM SER-ACNC: 0.13 INDEX — SIGNIFICANT CHANGE UP (ref 0–0.9)
MAGNESIUM SERPL-MCNC: 2.1 MG/DL — SIGNIFICANT CHANGE UP (ref 1.6–2.6)
MCHC RBC-ENTMCNC: 25.3 PG — LOW (ref 27–34)
MCHC RBC-ENTMCNC: 31.7 G/DL — LOW (ref 32–36)
MCV RBC AUTO: 79.8 FL — LOW (ref 80–100)
MYCOPLASMA AG SPEC QL: NEGATIVE — SIGNIFICANT CHANGE UP
NRBC # BLD: 0 /100 WBCS — SIGNIFICANT CHANGE UP (ref 0–0)
PHOSPHATE SERPL-MCNC: 3.4 MG/DL — SIGNIFICANT CHANGE UP (ref 2.5–4.5)
PLATELET # BLD AUTO: 309 K/UL — SIGNIFICANT CHANGE UP (ref 150–400)
POTASSIUM SERPL-MCNC: 4 MMOL/L — SIGNIFICANT CHANGE UP (ref 3.5–5.3)
POTASSIUM SERPL-SCNC: 4 MMOL/L — SIGNIFICANT CHANGE UP (ref 3.5–5.3)
PROT SERPL-MCNC: 6 GM/DL — SIGNIFICANT CHANGE UP (ref 6–8.3)
RBC # BLD: 5.1 M/UL — SIGNIFICANT CHANGE UP (ref 4.2–5.8)
RBC # FLD: 13.7 % — SIGNIFICANT CHANGE UP (ref 10.3–14.5)
SODIUM SERPL-SCNC: 133 MMOL/L — LOW (ref 135–145)
SPECIMEN SOURCE: SIGNIFICANT CHANGE UP
T4 FREE SERPL-MCNC: 2 NG/DL — HIGH (ref 0.9–1.8)
TSH SERPL-MCNC: 0.84 UU/ML — SIGNIFICANT CHANGE UP (ref 0.36–3.74)
WBC # BLD: 13.94 K/UL — HIGH (ref 3.8–10.5)
WBC # FLD AUTO: 13.94 K/UL — HIGH (ref 3.8–10.5)

## 2023-10-03 PROCEDURE — 99233 SBSQ HOSP IP/OBS HIGH 50: CPT

## 2023-10-03 PROCEDURE — 99232 SBSQ HOSP IP/OBS MODERATE 35: CPT

## 2023-10-03 RX ORDER — INSULIN LISPRO 100/ML
5 VIAL (ML) SUBCUTANEOUS
Refills: 0 | Status: DISCONTINUED | OUTPATIENT
Start: 2023-10-03 | End: 2023-10-04

## 2023-10-03 RX ORDER — INSULIN GLARGINE 100 [IU]/ML
16 INJECTION, SOLUTION SUBCUTANEOUS AT BEDTIME
Refills: 0 | Status: DISCONTINUED | OUTPATIENT
Start: 2023-10-03 | End: 2023-10-04

## 2023-10-03 RX ADMIN — Medication 3: at 21:12

## 2023-10-03 RX ADMIN — Medication 4: at 08:13

## 2023-10-03 RX ADMIN — Medication 650 MILLIGRAM(S): at 01:28

## 2023-10-03 RX ADMIN — ATORVASTATIN CALCIUM 80 MILLIGRAM(S): 80 TABLET, FILM COATED ORAL at 21:03

## 2023-10-03 RX ADMIN — Medication 3 UNIT(S): at 08:13

## 2023-10-03 RX ADMIN — CEFEPIME 100 MILLIGRAM(S): 1 INJECTION, POWDER, FOR SOLUTION INTRAMUSCULAR; INTRAVENOUS at 05:35

## 2023-10-03 RX ADMIN — Medication 81 MILLIGRAM(S): at 12:43

## 2023-10-03 RX ADMIN — Medication 12.5 MILLIGRAM(S): at 17:11

## 2023-10-03 RX ADMIN — ENOXAPARIN SODIUM 40 MILLIGRAM(S): 100 INJECTION SUBCUTANEOUS at 05:21

## 2023-10-03 RX ADMIN — CEFEPIME 100 MILLIGRAM(S): 1 INJECTION, POWDER, FOR SOLUTION INTRAMUSCULAR; INTRAVENOUS at 17:10

## 2023-10-03 RX ADMIN — INSULIN GLARGINE 16 UNIT(S): 100 INJECTION, SOLUTION SUBCUTANEOUS at 21:12

## 2023-10-03 RX ADMIN — Medication 3 UNIT(S): at 12:42

## 2023-10-03 RX ADMIN — Medication 5 UNIT(S): at 17:10

## 2023-10-03 RX ADMIN — Medication 6: at 12:42

## 2023-10-03 RX ADMIN — Medication 650 MILLIGRAM(S): at 20:54

## 2023-10-03 RX ADMIN — Medication 10: at 17:09

## 2023-10-03 RX ADMIN — BUMETANIDE 2 MILLIGRAM(S): 0.25 INJECTION INTRAMUSCULAR; INTRAVENOUS at 15:26

## 2023-10-03 RX ADMIN — BUMETANIDE 2 MILLIGRAM(S): 0.25 INJECTION INTRAMUSCULAR; INTRAVENOUS at 05:21

## 2023-10-03 RX ADMIN — Medication 650 MILLIGRAM(S): at 00:28

## 2023-10-03 RX ADMIN — CHLORHEXIDINE GLUCONATE 1 APPLICATION(S): 213 SOLUTION TOPICAL at 10:06

## 2023-10-03 RX ADMIN — CLOPIDOGREL BISULFATE 75 MILLIGRAM(S): 75 TABLET, FILM COATED ORAL at 12:43

## 2023-10-03 RX ADMIN — PANTOPRAZOLE SODIUM 40 MILLIGRAM(S): 20 TABLET, DELAYED RELEASE ORAL at 05:22

## 2023-10-03 RX ADMIN — PANTOPRAZOLE SODIUM 40 MILLIGRAM(S): 20 TABLET, DELAYED RELEASE ORAL at 17:10

## 2023-10-03 NOTE — PROGRESS NOTE ADULT - SUBJECTIVE AND OBJECTIVE BOX
59 yo m pmhx CVA with mild left sided deficits, HTN, DM2, vertigo, HLD, Mobitz II s/pp Medtronic dual chamber ICD (12/21/22) biba from home with complaints of dyspnea and chest pain and was admitted w/ acute hypoxic respiratory failure likely due to acute pulmonary edema due to acute HFrEF in setting of acute NSTEMI, LAURA and enterovirus infection. He is lying in bed in NAD.      MEDICATIONS  (STANDING):  aspirin enteric coated 81 milliGRAM(s) Oral daily  atorvastatin 80 milliGRAM(s) Oral at bedtime  buMETAnide Injectable 2 milliGRAM(s) IV Push two times a day  cefepime   IVPB      cefepime   IVPB 2000 milliGRAM(s) IV Intermittent every 12 hours  chlorhexidine 2% Cloths 1 Application(s) Topical <User Schedule>  clopidogrel Tablet 75 milliGRAM(s) Oral daily  enoxaparin Injectable 40 milliGRAM(s) SubCutaneous every 24 hours  influenza   Vaccine 0.5 milliLiter(s) IntraMuscular once  insulin glargine Injectable (LANTUS) 16 Unit(s) SubCutaneous at bedtime  insulin lispro (ADMELOG) corrective regimen sliding scale   SubCutaneous three times a day before meals  insulin lispro (ADMELOG) corrective regimen sliding scale   SubCutaneous at bedtime  insulin lispro Injectable (ADMELOG) 5 Unit(s) SubCutaneous three times a day before meals  metoprolol succinate ER 12.5 milliGRAM(s) Oral two times a day  pantoprazole  Injectable 40 milliGRAM(s) IV Push two times a day    MEDICATIONS  (PRN):  acetaminophen     Tablet .. 650 milliGRAM(s) Oral every 6 hours PRN Temp greater or equal to 38C (100.4F), Mild Pain (1 - 3)  albuterol    0.083% 2.5 milliGRAM(s) Nebulizer every 6 hours PRN Shortness of Breath and/or Wheezing  dextrose Oral Gel 15 Gram(s) Oral once PRN Blood Glucose LESS THAN 70 milliGRAM(s)/deciliter      Allergies    No Known Allergies    Intolerances        Vital Signs Last 24 Hrs  T(C): 38.5 (03 Oct 2023 20:47), Max: 38.5 (03 Oct 2023 20:47)  T(F): 101.3 (03 Oct 2023 20:47), Max: 101.3 (03 Oct 2023 20:47)  HR: 108 (03 Oct 2023 19:30) (94 - 108)  BP: 107/74 (03 Oct 2023 19:30) (98/57 - 111/74)   RR: 18 (03 Oct 2023 15:59) (18 - 19)  SpO2: 97% (03 Oct 2023 19:30) (93% - 98%)    Parameters below as of 03 Oct 2023 19:30  Patient On (Oxygen Delivery Method): nasal cannula  O2 Flow (L/min): 3      PHYSICAL EXAM:  GENERAL: NAD, well-groomed, well-developed  HEAD:  Atraumatic, Normocephalic  EYES: EOMI, PERRLA   NECK: Supple   NERVOUS SYSTEM:  Alert & Oriented X3, Good concentration   CHEST/LUNG: Clear to auscultation bilaterally; No rales, rhonchi, wheezing, or rubs  HEART: Regular rate and rhythm; No murmurs, rubs, or gallops  ABDOMEN: Soft, Nontender, Nondistended; Bowel sounds present  EXTREMITIES: No clubbing, cyanosis, or edema      LABS:                        12.9   13.94 )-----------( 309      ( 03 Oct 2023 07:37 )             40.7     10-03    133<L>  |  99  |  29<H>  ----------------------------<  207<H>  4.0   |  26  |  1.70<H>    Ca    8.0<L>      03 Oct 2023 07:37  Phos  3.4     10-03  Mg     2.1     10-03    TPro  6.0  /  Alb  2.0<L>  /  TBili  0.7  /  DBili  x   /  AST  41<H>  /  ALT  91<H>  /  AlkPhos  49  10-03      Urinalysis Basic - ( 03 Oct 2023 07:37 )    Color: x / Appearance: x / SG: x / pH: x  Gluc: 207 mg/dL / Ketone: x  / Bili: x / Urobili: x   Blood: x / Protein: x / Nitrite: x   Leuk Esterase: x / RBC: x / WBC x   Sq Epi: x / Non Sq Epi: x / Bacteria: x      CAPILLARY BLOOD GLUCOSE      POCT Blood Glucose.: 361 mg/dL (03 Oct 2023 21:10)  POCT Blood Glucose.: 354 mg/dL (03 Oct 2023 16:35)  POCT Blood Glucose.: 296 mg/dL (03 Oct 2023 11:52)  POCT Blood Glucose.: 236 mg/dL (03 Oct 2023 07:30)      Culture - Stool (collected 01 Oct 2023 10:30)  Source: .Stool Feces  Final Report (03 Oct 2023 15:53):    No enteric pathogens isolated.    (Stool culture examined for Salmonella,    Shigella, Campylobacter, Aeromonas, Plesiomonas,    Vibrio, E.coli O157 and Yersinia)    Culture - Urine (collected 30 Sep 2023 18:23)  Source: Clean Catch Clean Catch (Midstream)  Final Report (02 Oct 2023 07:35):    No growth    Culture - Blood (collected 30 Sep 2023 14:15)  Source: .Blood Blood  Preliminary Report (03 Oct 2023 20:01):    No growth at 72 Hours    Culture - Blood (collected 30 Sep 2023 14:05)  Source: .Blood Blood  Preliminary Report (03 Oct 2023 20:01):    No growth at 72 Hours      RADIOLOGY & ADDITIONAL TESTS:    10-02-23 @ 07:01  -  10-03-23 @ 07:00  --------------------------------------------------------  IN:    Oral Fluid: 240 mL  Total IN: 240 mL    OUT:    Indwelling Catheter - Urethral (mL): 700 mL    Voided (mL): 1600 mL  Total OUT: 2300 mL    Total NET: -2060 mL      10-03-23 @ 07:01  -  10-03-23 @ 21:20  --------------------------------------------------------  IN:    Oral Fluid: 240 mL  Total IN: 240 mL    OUT:    Stool (mL): 1 mL    Voided (mL): 1000 mL  Total OUT: 1001 mL    Total NET: -761 mL

## 2023-10-03 NOTE — PROGRESS NOTE ADULT - ASSESSMENT
57 yo m pmhx CVA with mild left sided deficits, HTN, DM2, vertigo, HLD, Mobitz II s/pp Medtronic dual chamber ICD (12/21/22) biba from home with complaints of dyspnea and chest pain and was admitted w/ acute hypoxic respiratory failure likely due to acute pulmonary edema due to acute HFrEF in setting of acute NSTEMI, LAURA and enterovirus infection.     Acute hypoxic respiratory failure due to acute sys/ diastolic CHF  - CT on admission showed patchy and confluent ground-glass and consolidative opacities primarily in the bilateral upper and lower lobes c/w a combination of infection and edema and small bilateral pleural effusions  - patient has improved w/ Lasix and Bipap, now on NC  - Echo showed EF 20% w/ severely decreased global left ventricular systolic function w/ multiple left ventricular regional wall motion abnormalities & grade II diastolic dysfunction.   - c/w Bumex & add metoprolol     Elevated Trop  - likely due to MI  - trop peaked at 24,293  - as per cardio, will likely need transfer for cardiac catheterization, once infectious symptoms resolve  - c/w ASA, Plavix & Lipitor  - heparin gtt stopped in ICU    lactic acidosis  - lactate 2.9 on admission  - likely due to hypoxia      Fevers  - likelly due to PNA as CT showed hazy groundglass opacities in the upper lobes w/ mild to moderate bilateral pleural effusions with overlying compressive atelectasis  - s/p Ceftriaxone in ICU    - as per ID, stopped vanc stopped  - c/w Cefepime  - Sputum + on 9/29 gram+ cocci in pairs, gram -rods and gram - coccobacilli    - CXR showed new perihilar groundglass like opacities, will get 2 view CXR  - as per ID, will check HIV, ESR, HAIDER, ANCA, RF  - check CT chest, abd & pelvis    Urinary retention   - Douglass placed 9/30 after multiple straight caths  - urology following     LAURA  - ATN vs cardiorenal  - Cr stable     HTN  - stop IV hydralazine & add metoprolol     DM  - c/w Lantus, mealtime lispro and ISS  - hgb A1C is 6.9    Possible Hyperthyroidism  - CT showed goiter  - TSH is 0.258 but free T4 is 2  - endo note read and appreciated, will repeat thyroid function test in AM and if still TSH is low and free thyroxine does not come down, he may benefit from low-dose methimazole.    Prophylaxis:  DVT: Lovenox  GI: Protonix     Dispo: [facility]      57 yo m pmhx CVA with mild left sided deficits, HTN, DM2, vertigo, HLD, Mobitz II s/pp Medtronic dual chamber ICD (12/21/22) biba from home with complaints of dyspnea and chest pain and was admitted w/ acute hypoxic respiratory failure likely due to acute pulmonary edema due to acute HFrEF in setting of acute NSTEMI, LAURA and enterovirus infection.     Acute hypoxic respiratory failure due to acute sys/ diastolic CHF  - CT on admission showed patchy and confluent ground-glass and consolidative opacities primarily in the bilateral upper and lower lobes c/w a combination of infection and edema and small bilateral pleural effusions  - patient has improved w/ Lasix and Bipap, now on NC  - Echo showed EF 20% w/ severely decreased global left ventricular systolic function w/ multiple left ventricular regional wall motion abnormalities & grade II diastolic dysfunction.   - c/w Bumex & add metoprolol     Elevated Trop  - likely due to MI  - trop peaked at 24,293  - as per cardio, will likely need transfer for cardiac catheterization, once infectious symptoms resolve  - c/w ASA, Plavix & Lipitor  - heparin gtt stopped in ICU    lactic acidosis  - lactate 2.9 on admission  - likely due to hypoxia      Fevers  - likely due to PNA as CT showed hazy groundglass opacities in the upper lobes w/ mild to moderate bilateral pleural effusions with overlying compressive atelectasis  - s/p Ceftriaxone in ICU    - as per ID, stopped vanc stopped  - c/w Cefepime  - Sputum + on 9/29 gram+ cocci in pairs, gram -rods and gram - coccobacilli     - as per ID, will check HIV, ESR, HAIDER, ANCA, RF     Urinary retention   - Douglass placed 9/30 after multiple straight caths  - urology following     LAURA  - ATN vs cardiorenal  - Cr stable     HTN  - stop IV hydralazine & add metoprolol     DM  - c/w Lantus, mealtime lispro and ISS  - hgb A1C is 6.9    Possible Hyperthyroidism  - CT showed goiter  - TSH is 0.258 but free T4 is 2  - endo note read and appreciated, will repeat thyroid function test in AM and if still TSH is low and free thyroxine does not come down, he may benefit from low-dose methimazole.    Prophylaxis:  DVT: Lovenox  GI: Protonix     Dispo: [facility]

## 2023-10-03 NOTE — PROGRESS NOTE ADULT - ASSESSMENT
58M with urinary retention, francis placed 9/30  No noticeable change in renal function, CR ~1.76    continue francis  If pt remains in hospital can attempt TOV,   otherwise follow up with urology OP with leg bag  Urology signing off    d/w Dr. Jorge Villalpando PA-C

## 2023-10-03 NOTE — PROGRESS NOTE ADULT - SUBJECTIVE AND OBJECTIVE BOX
UROLOGY DAILY PROGRESS NOTE    No complaints. francis in situ    MEDICATIONS  (STANDING):  aspirin enteric coated 81 milliGRAM(s) Oral daily  atorvastatin 80 milliGRAM(s) Oral at bedtime  buMETAnide Injectable 2 milliGRAM(s) IV Push two times a day  cefepime   IVPB      cefepime   IVPB 2000 milliGRAM(s) IV Intermittent every 12 hours  chlorhexidine 2% Cloths 1 Application(s) Topical <User Schedule>  clopidogrel Tablet 75 milliGRAM(s) Oral daily  enoxaparin Injectable 40 milliGRAM(s) SubCutaneous every 24 hours  influenza   Vaccine 0.5 milliLiter(s) IntraMuscular once  insulin glargine Injectable (LANTUS) 16 Unit(s) SubCutaneous at bedtime  insulin lispro (ADMELOG) corrective regimen sliding scale   SubCutaneous three times a day before meals  insulin lispro (ADMELOG) corrective regimen sliding scale   SubCutaneous at bedtime  insulin lispro Injectable (ADMELOG) 5 Unit(s) SubCutaneous three times a day before meals  metoprolol succinate ER 12.5 milliGRAM(s) Oral two times a day  pantoprazole  Injectable 40 milliGRAM(s) IV Push two times a day    MEDICATIONS  (PRN):  acetaminophen     Tablet .. 650 milliGRAM(s) Oral every 6 hours PRN Temp greater or equal to 38C (100.4F), Mild Pain (1 - 3)  albuterol    0.083% 2.5 milliGRAM(s) Nebulizer every 6 hours PRN Shortness of Breath and/or Wheezing  dextrose Oral Gel 15 Gram(s) Oral once PRN Blood Glucose LESS THAN 70 milliGRAM(s)/deciliter      REVIEW OF SYSTEMS   [x] A ten-point review of systems was otherwise negative except as noted.  [ ] Due to altered mental status/intubation, subjective information were not able to be obtained from patient. History was obtained, to the extent possible, from review of the chart and collateral sources of information.  Vital Signs Last 24 Hrs  T(C): 37.2 (03 Oct 2023 15:59), Max: 38.1 (03 Oct 2023 00:11)  T(F): 99 (03 Oct 2023 15:59), Max: 100.5 (03 Oct 2023 00:11)  HR: 98 (03 Oct 2023 15:59) (94 - 105)  BP: 111/74 (03 Oct 2023 15:59) (98/57 - 111/74)  BP(mean): --  RR: 18 (03 Oct 2023 15:59) (18 - 19)  SpO2: 98% (03 Oct 2023 15:59) (93% - 98%)    Parameters below as of 03 Oct 2023 10:57  Patient On (Oxygen Delivery Method): nasal cannula  O2 Flow (L/min): 3      PHYSICAL EXAM:    GEN: NAD, well-developed, awake and alert.  SKIN: Good color, non diaphoretic.  HEENT: NC/AT.  RESP: No dyspnea, non-labored breathing. No use of accessory muscles.  CARDIO: +S1/S2  ABDO: Soft, NT/ND, no palpable bladder, no suprapubic tenderness/ + Suprapubic Tube draining clear yellow urine.  BACK: No CVAT B/L  : + Circumcised male. B/L descended testicles x 2. No lesions or palpable masses noted B/L. No meatal discharge. + Indwelling francis in place, draining clear yellow urine.       I&O's Summary    02 Oct 2023 07:01  -  03 Oct 2023 07:00  --------------------------------------------------------  IN: 240 mL / OUT: 2300 mL / NET: -2060 mL    03 Oct 2023 07:01  -  03 Oct 2023 17:49  --------------------------------------------------------  IN: 240 mL / OUT: 600 mL / NET: -360 mL        LABS:                        12.9   13.94 )-----------( 309      ( 03 Oct 2023 07:37 )             40.7     10-03    133<L>  |  99  |  29<H>  ----------------------------<  207<H>  4.0   |  26  |  1.70<H>    Ca    8.0<L>      03 Oct 2023 07:37  Phos  3.4     10-03  Mg     2.1     10-03    TPro  6.0  /  Alb  2.0<L>  /  TBili  0.7  /  DBili  x   /  AST  41<H>  /  ALT  91<H>  /  AlkPhos  49  10-03      Urinalysis Basic - ( 03 Oct 2023 07:37 )    Color: x / Appearance: x / SG: x / pH: x  Gluc: 207 mg/dL / Ketone: x  / Bili: x / Urobili: x   Blood: x / Protein: x / Nitrite: x   Leuk Esterase: x / RBC: x / WBC x   Sq Epi: x / Non Sq Epi: x / Bacteria: x            RADIOLOGY & ADDITIONAL STUDIES:

## 2023-10-03 NOTE — PROGRESS NOTE ADULT - ASSESSMENT
59 yo m pmhx CVA with mild left sided deficits, HTN, DM2, vertigo, HLD, Mobitz II s/pp Medtronic dual chamber ICD (12/21/22) biba from home with complaints of dyspnea and chest pain and was admitted w/ acute hypoxic respiratory failure likely due to acute pulmonary edema due to acute HFrEF in setting of acute NSTEMI, LAURA and enterovirus infection. He is lying in bed in NAD.    Echo: 9/26/23 LVEF 20%. +WMA. Grade 2 DD.  Global longitudinal strain using Gliph software at a HR of 121bpm and /99 is -5.1%, which is severely reduced. There is some sparing of the basal segments.    1) Acute Hypoxic Respiratory Failure 2ndary Acute HFrEF and +entero/rhinovirus/ +Pseudomonas  2) NSTEMI (troponin 24,293)  3) Cardiomyopathy (LVEF 20%) s/p ICD ([employer], DDD, 12/21/22)  4) HTN  5) DM-2    - patient appears comfortable, no chest pain, SOB, breathing improved, still needs 4L NC oxygen saturation 94%  - tele reviewed: paced 90s  - Echo reviewed +WMA, LVEF 20%  - c/w ASA/Plavix  - c/w Bumex 2mg IV BID, Toprol XL 12.5mg BID, will optimize GDMT when bp and renal function improves  - c/w strict I&O, daily weight, patient remains in negative balance  - Antibiotics as per primary team, patient having persistent fevers past 24 hours, last temp 100.2F, when infection resolves and cultures negative, will then arrange for transfer for cardiac cath.

## 2023-10-03 NOTE — PROGRESS NOTE ADULT - SUBJECTIVE AND OBJECTIVE BOX
Patient is a 58y old  Male who presents with a chief complaint of CHF Exacerbation (02 Oct 2023 23:20)      INTERVAL HX:  Patient seen and examined @ bedside.  No respiratory distress, chest pain or SOB.    PAST MEDICAL & SURGICAL HISTORY:  CVA (cerebral vascular accident)      HTN (hypertension)      DM (diabetes mellitus)      HTN (hypertension)      CVA (cerebrovascular accident)      HTN (hypertension)      DM (diabetes mellitus)      HTN (hypertension)      S/P hernia repair  right inguinal hernia 1992      S/P cystoscopy  2012        	  MEDICATIONS:  MEDICATIONS  (STANDING):  aspirin enteric coated 81 milliGRAM(s) Oral daily  atorvastatin 80 milliGRAM(s) Oral at bedtime  buMETAnide Injectable 2 milliGRAM(s) IV Push two times a day  cefepime   IVPB      cefepime   IVPB 2000 milliGRAM(s) IV Intermittent every 12 hours  chlorhexidine 2% Cloths 1 Application(s) Topical <User Schedule>  clopidogrel Tablet 75 milliGRAM(s) Oral daily  enoxaparin Injectable 40 milliGRAM(s) SubCutaneous every 24 hours  influenza   Vaccine 0.5 milliLiter(s) IntraMuscular once  insulin glargine Injectable (LANTUS) 10 Unit(s) SubCutaneous at bedtime  insulin lispro (ADMELOG) corrective regimen sliding scale   SubCutaneous three times a day before meals  insulin lispro (ADMELOG) corrective regimen sliding scale   SubCutaneous at bedtime  insulin lispro Injectable (ADMELOG) 3 Unit(s) SubCutaneous three times a day before meals  metoprolol succinate ER 12.5 milliGRAM(s) Oral two times a day  pantoprazole  Injectable 40 milliGRAM(s) IV Push two times a day    MEDICATIONS  (PRN):  acetaminophen     Tablet .. 650 milliGRAM(s) Oral every 6 hours PRN Temp greater or equal to 38C (100.4F), Mild Pain (1 - 3)  albuterol    0.083% 2.5 milliGRAM(s) Nebulizer every 6 hours PRN Shortness of Breath and/or Wheezing  dextrose Oral Gel 15 Gram(s) Oral once PRN Blood Glucose LESS THAN 70 milliGRAM(s)/deciliter      Vitals:  T(F): 100.2 (10-03-23 @ 05:20), Max: 101.2 (10-02-23 @ 15:50)  HR: 94 (10-03-23 @ 06:03) (94 - 111)  BP: 98/57 (10-03-23 @ 06:03) (98/57 - 108/70)  RR: 19 (10-03-23 @ 05:20) (18 - 19)  SpO2: 94% (10-03-23 @ 05:20) (93% - 98%)  Wt(kg): --    10-02 @ 07:01  -  10-03 @ 07:00  --------------------------------------------------------  IN:    Oral Fluid: 240 mL  Total IN: 240 mL    OUT:    Indwelling Catheter - Urethral (mL): 700 mL    Voided (mL): 1600 mL  Total OUT: 2300 mL    Total NET: -2060 mL              PHYSICAL EXAM:  General: Awake, responsive  CV: S1 S2 RRR.  +ICD Left  Lungs: CTABL  GI: Soft, BS +, ND, NT  Extremities: No edema    TELEMETRY: v-paced 90s  	    LABS:	 	    CARDIAC MARKERS:          02 Oct 2023 08:29    134    |  98     |  32     ----------------------------<  238    4.1     |  30     |  1.73   01 Oct 2023 07:20    134    |  100    |  34     ----------------------------<  243    4.0     |  29     |  1.76     Ca    8.2        02 Oct 2023 08:29  Phos  3.2       02 Oct 2023 08:29  Mg     2.2       02 Oct 2023 08:29    TPro  6.3    /  Alb  2.2    /  TBili  0.8    /  DBili  x      /  AST  42     /  ALT  85     /  AlkPhos  57     02 Oct 2023 08:29                          13.8   11.37 )-----------( 305      ( 02 Oct 2023 08:29 )             42.8 ,                       13.9   11.71 )-----------( 306      ( 01 Oct 2023 07:20 )             42.8     TSH: Thyroid Stimulating Hormone, Serum: 0.844 uU/mL (10-03 @ 07:37)

## 2023-10-03 NOTE — PROGRESS NOTE ADULT - SUBJECTIVE AND OBJECTIVE BOX
BRANDEN MARRUFO  MRN-[id number]    Follow Up:  PNA, fevers     Interval History: the pt was seen and examined earlier, no acute distress, pt is in bed, awake, able to state his name and birthday, answered that he is in the hospital when given choices (school, home, hospital), thinks it is 1921, was able to state the name of current president. Pt had a low grade temps overnight and this morning, on 3L NC, comfortable, WBC elevated 13.94.    PAST MEDICAL & SURGICAL HISTORY:  CVA (cerebral vascular accident)      HTN (hypertension)      DM (diabetes mellitus)      HTN (hypertension)      CVA (cerebrovascular accident)      HTN (hypertension)      DM (diabetes mellitus)      HTN (hypertension)      S/P hernia repair  right inguinal hernia 1992      S/P cystoscopy  2012          ROS:    [ ] Unobtainable because:  [x ] All other systems negative    Constitutional: + fever  Head: no trauma  Eyes: no vision changes, no eye pain  ENT:  no sore throat, no rhinorrhea  Cardiovascular:  no chest pain, no palpitation  Respiratory:  no SOB, no cough  GI:  no abd pain, no vomiting, no diarrhea  urinary: no dysuria, no hematuria, no flank pain  musculoskeletal:  no joint pain, no joint swelling  skin:  no rash  neurology:  no headache, no seizure, no change in mental status  psych: no anxiety, no depression         Allergies  No Known Allergies        ANTIMICROBIALS:  cefepime   IVPB    cefepime   IVPB 2000 every 12 hours      OTHER MEDS:  acetaminophen     Tablet .. 650 milliGRAM(s) Oral every 6 hours PRN  albuterol    0.083% 2.5 milliGRAM(s) Nebulizer every 6 hours PRN  aspirin enteric coated 81 milliGRAM(s) Oral daily  atorvastatin 80 milliGRAM(s) Oral at bedtime  buMETAnide Injectable 2 milliGRAM(s) IV Push two times a day  chlorhexidine 2% Cloths 1 Application(s) Topical <User Schedule>  clopidogrel Tablet 75 milliGRAM(s) Oral daily  dextrose Oral Gel 15 Gram(s) Oral once PRN  enoxaparin Injectable 40 milliGRAM(s) SubCutaneous every 24 hours  influenza   Vaccine 0.5 milliLiter(s) IntraMuscular once  insulin glargine Injectable (LANTUS) 10 Unit(s) SubCutaneous at bedtime  insulin lispro (ADMELOG) corrective regimen sliding scale   SubCutaneous three times a day before meals  insulin lispro (ADMELOG) corrective regimen sliding scale   SubCutaneous at bedtime  insulin lispro Injectable (ADMELOG) 3 Unit(s) SubCutaneous three times a day before meals  metoprolol succinate ER 12.5 milliGRAM(s) Oral two times a day  pantoprazole  Injectable 40 milliGRAM(s) IV Push two times a day      Vital Signs Last 24 Hrs  T(C): 37 (03 Oct 2023 10:57), Max: 38.4 (02 Oct 2023 15:50)  T(F): 98.6 (03 Oct 2023 10:57), Max: 101.2 (02 Oct 2023 15:50)  HR: 100 (03 Oct 2023 10:57) (94 - 111)  BP: 104/68 (03 Oct 2023 10:57) (98/57 - 108/70)  BP(mean): --  RR: 18 (03 Oct 2023 10:57) (18 - 19)  SpO2: 97% (03 Oct 2023 10:57) (93% - 97%)    Parameters below as of 03 Oct 2023 10:57  Patient On (Oxygen Delivery Method): nasal cannula  O2 Flow (L/min): 3      Physical Exam:  General: Nontoxic-appearing Male in no acute distress. NC  HEENT: AT/NC. Anicteric. Conjunctiva pink and moist. Oropharynx clear.  Neck: Not rigid. No sense of mass.  Nodes: None palpable.  Chest: AICD site benign  Lungs: Diminished BS Bilaterally, no wheezes, no rhonchi   Heart: Regular rate and rhythm. No Murmur. No rub. No gallop. No palpable thrill.  Abdomen: Bowel sounds present and normoactive. Soft. Nondistended. Nontender. No sense of mass. No organomegaly.  Back: No spinal tenderness. No costovertebral angle tenderness.   Extremities: No cyanosis or clubbing. No edema.   Skin: Warm. Dry. Good turgor. No rash. No vasculitic stigmata.  Psychiatric: Appropriate affect and mood for situation.     WBC Count: 13.94 K/uL (10-03 @ 07:37)  WBC Count: 11.37 K/uL (10-02 @ 08:29)  WBC Count: 11.71 K/uL (10-01 @ 07:20)  WBC Count: 11.01 K/uL (09-30 @ 06:35)  WBC Count: 10.85 K/uL (09-29 @ 07:00)  WBC Count: 12.52 K/uL (09-28 @ 06:40)  WBC Count: 12.86 K/uL (09-27 @ 08:30)                            12.9   13.94 )-----------( 309      ( 03 Oct 2023 07:37 )             40.7       10-03    133<L>  |  99  |  29<H>  ----------------------------<  207<H>  4.0   |  26  |  1.70<H>    Ca    8.0<L>      03 Oct 2023 07:37  Phos  3.4     10-03  Mg     2.1     10-03    TPro  6.0  /  Alb  2.0<L>  /  TBili  0.7  /  DBili  x   /  AST  41<H>  /  ALT  91<H>  /  AlkPhos  49  10-03      Urinalysis Basic - ( 03 Oct 2023 07:37 )    Color: x / Appearance: x / SG: x / pH: x  Gluc: 207 mg/dL / Ketone: x  / Bili: x / Urobili: x   Blood: x / Protein: x / Nitrite: x   Leuk Esterase: x / RBC: x / WBC x   Sq Epi: x / Non Sq Epi: x / Bacteria: x        Creatinine Trend: 1.70<--, 1.73<--, 1.76<--, 1.84<--, 1.80<--, 1.91<--      MICROBIOLOGY:  v  .Stool Feces  10-01-23   No enteric pathogens to date: Final culture pending  --  --      Clean Catch Clean Catch (Midstream)  09-30-23   No growth  --  --      .Blood Blood  09-30-23   No growth at 48 Hours  --  --      .Blood Blood  09-30-23   No growth at 48 Hours  --  --      .Sputum Sputum  09-28-23   Rare Pseudomonas aeruginosa  Normal Respiratory Tamela present  --  Pseudomonas aeruginosa      .Blood Blood  09-27-23   No growth at 5 days  --  --      .Blood Blood  09-27-23   No growth at 5 days  --  --      .Blood Blood  09-27-23   No growth at 5 days  --  --      .Blood Blood  09-26-23   No growth at 5 days  --  --      Clean Catch Clean Catch (Midstream)  09-26-23   <10,000 CFU/mL Normal Urogenital Tamela  --  --          Rapid RVP Result: Detected (09-28 @ 12:40)        C-Reactive Protein, Serum: 29 (10-01)  C-Reactive Protein, Serum: 95 (09-26)          Procalcitonin, Serum: 0.34 (09-30-23 @ 14:05)  Procalcitonin, Serum: 0.54 (09-29-23 @ 03:55)      SARS-CoV-2: NotDetec (28 Sep 2023 12:40)  SARS-CoV-2: NotDetec (26 Sep 2023 00:39)    RADIOLOGY:    < from: CT Abdomen and Pelvis w/ Oral Cont (10.02.23 @ 18:46) >    ACC: [id number] EXAM:  CT ABDOMEN AND PELVIS OC   ORDERED BY: LOI BALDERAS     ACC: [id number] EXAM:  CT CHEST OC   ORDERED BY: LOI BALDERAS     PROCEDURE DATE:  10/02/2023          INTERPRETATION:  CLINICAL INFORMATION: Fever of unknown origin    COMPARISON: September 26, 2023    CONTRAST/COMPLICATIONS:  IV Contrast: NONE  Oral Contrast: Water (accession [id number]), Omnipaque 300 (accession   [id number])  Complications: None reported at time of study completion    PROCEDURE:  CT of the Chest, Abdomen and Pelvis was performed.  Sagittal and coronal reformats were performed.    Note: The exam is limited because some types of pathology may not be   adequately demonstrated due to lack of contrast enhancement.    FINDINGS:  CHEST:  LUNGS AND LARGE AIRWAYS: Bibasilar compressive atelectasis. Groundglass   opacity in the medial aspect of the left upper lobe adjacent to the   cardiac border. Mild bronchiectasis with patchy groundglass opacity in   the right upper lobe.  PLEURA: Mild to moderate bilateral pleural effusions.  VESSELS: Atherosclerotic changes of the thoracic aorta and coronary   arteries. Main pulmonary artery is normal in caliber.  HEART: Heart size is normal. Small pericardial effusion.  MEDIASTINUM AND MEIR: No mediastinal hemorrhage. Enlarged, nodular left   lobe of thyroid, with superior mediastinal extension.  CHEST WALL AND LOWER NECK: Pulse generator in the anterior left chest   wall with leads extending into the right atrial appendage and right   ventricle.    ABDOMEN AND PELVIS:  LIVER: Unremarkable  BILE DUCTS: Unremarkable  GALLBLADDER: Unremarkable  SPLEEN: Unremarkable  PANCREAS: Unremarkable  ADRENALS: Unremarkable  KIDNEYS/URETERS: Incidental cysts.    BLADDER: Decompressed by Douglass catheter  REPRODUCTIVE ORGANS: Prostate is normal in size    BOWEL: No bowel obstruction. Normal appendix.  PERITONEUM: No ascites.  VESSELS: Atherosclerotic changes.  RETROPERITONEUM/LYMPH NODES: No retroperitoneal hemorrhage. No enlarged   lymph node measuring greater than 10 mm in short axis seen within the   limitations of noncontrast imaging  ABDOMINAL WALL: Unremarkable  BONES: Unremarkable. Incidental intraosseous hemangioma in T9.    IMPRESSION:    1. Hazy groundglass opacities in the upper lobes. Mild to moderate   bilateral pleural effusions with overlying compressive atelectasis.   Differential considerations include edema and/or infection.  2. Unremarkable, unenhanced CT of the abdomen and pelvis.        --- End of Report ---            MARLEY FERNANDEZ MD; Attending Radiologist  This document has been electronically signed. Oct  2 2023  9:21PM    < end of copied text >

## 2023-10-03 NOTE — PROGRESS NOTE ADULT - ASSESSMENT
58M w PMHx  CAD, CVA, DM (suboptimal compliance, checks FS Q4days though a1c <7) PPM/AICD for bradycardia/Mobitz II, LAD- thyroid tissue in retrospect,  now presenting with pneumonia, CHF, NSTEMI, renal insufficiency. Patient treated for CAP, still febrile though at lease at present hyperthermia blanket could have been playing a role.   Seems out of proportion to attribute to rhinovirus/enterovirus  More extensive pneumonia has a more delayed response to therapy  No RF for resistant fredi elicited  Legionella negative  AICD site benign, cx NTD  Exam otherwise unrevealing for acute infectious process.   COVID-negative x2  Blood cx NTD  TSH not suppressed    10/2: pt is feeling better, however, continues having fevers, had a fever of 101.6  at midnight and 101.2 today midday, on NC, WBC without significant change 11.37, Cr is about the same 1.73, BCs and UCs with no growth to date, abx were changed to cefepime and vanco over the weekend, Cefepime IV continued, Vancomycin stopped today.   Pt will need re-imaging to evaluate for the source of pt's fevers.   Attending Addendum--  Case reviewed with NP Mamta Wall. Her note reviewed and modified as appropriate.   Still febrile, though trend has been better  CT's as above    10/3: the pt is somewhat forgetful, low grade temps overnight and this morning, on 3L NC and comfortable, WBC elevated, 13.94, Cr about the same as yesterday, yesterday's CT c/a/p - Hazy groundglass opacities in the upper lobes. Mild to moderate bilateral pleural effusions with overlying compressive atelectasis. Differential considerations include edema and/or infection. Unremarkable, unenhanced CT of the abdomen and pelvis.  Cefepime IV continued     Suggestions--  MRSA nares PCR - negative   Standard precautions  Continue cefepime IV  CT c/a/p - reviewed   follow culture data  trend temperatures and WBC  monitor pt's renal function   HIV test - negative  ANCA negative   ESR 43  RF <10  wean off supplemental O2 as able    discussed with Dr. Cornejo  discussed with the pt and his wife at the bedside, all questions answered to the best of my ability

## 2023-10-04 DIAGNOSIS — I21.4 NON-ST ELEVATION (NSTEMI) MYOCARDIAL INFARCTION: ICD-10-CM

## 2023-10-04 LAB
ALBUMIN SERPL ELPH-MCNC: 2.1 G/DL — LOW (ref 3.3–5)
ALP SERPL-CCNC: 56 U/L — SIGNIFICANT CHANGE UP (ref 40–120)
ALT FLD-CCNC: 83 U/L — HIGH (ref 12–78)
ANION GAP SERPL CALC-SCNC: 7 MMOL/L — SIGNIFICANT CHANGE UP (ref 5–17)
AST SERPL-CCNC: 24 U/L — SIGNIFICANT CHANGE UP (ref 15–37)
BILIRUB SERPL-MCNC: 0.5 MG/DL — SIGNIFICANT CHANGE UP (ref 0.2–1.2)
BUN SERPL-MCNC: 29 MG/DL — HIGH (ref 7–23)
CALCIUM SERPL-MCNC: 8.1 MG/DL — LOW (ref 8.5–10.1)
CHLORIDE SERPL-SCNC: 99 MMOL/L — SIGNIFICANT CHANGE UP (ref 96–108)
CO2 SERPL-SCNC: 26 MMOL/L — SIGNIFICANT CHANGE UP (ref 22–31)
CREAT SERPL-MCNC: 1.81 MG/DL — HIGH (ref 0.5–1.3)
EGFR: 43 ML/MIN/1.73M2 — LOW
GLUCOSE BLDC GLUCOMTR-MCNC: 296 MG/DL — HIGH (ref 70–99)
GLUCOSE BLDC GLUCOMTR-MCNC: 304 MG/DL — HIGH (ref 70–99)
GLUCOSE BLDC GLUCOMTR-MCNC: 344 MG/DL — HIGH (ref 70–99)
GLUCOSE BLDC GLUCOMTR-MCNC: 367 MG/DL — HIGH (ref 70–99)
GLUCOSE SERPL-MCNC: 309 MG/DL — HIGH (ref 70–99)
HCT VFR BLD CALC: 37.9 % — LOW (ref 39–50)
HGB BLD-MCNC: 12.3 G/DL — LOW (ref 13–17)
MAGNESIUM SERPL-MCNC: 2.1 MG/DL — SIGNIFICANT CHANGE UP (ref 1.6–2.6)
MCHC RBC-ENTMCNC: 25.6 PG — LOW (ref 27–34)
MCHC RBC-ENTMCNC: 32.5 G/DL — SIGNIFICANT CHANGE UP (ref 32–36)
MCV RBC AUTO: 78.8 FL — LOW (ref 80–100)
MRSA PCR RESULT.: SIGNIFICANT CHANGE UP
NRBC # BLD: 0 /100 WBCS — SIGNIFICANT CHANGE UP (ref 0–0)
PHOSPHATE SERPL-MCNC: 3.2 MG/DL — SIGNIFICANT CHANGE UP (ref 2.5–4.5)
PLATELET # BLD AUTO: 336 K/UL — SIGNIFICANT CHANGE UP (ref 150–400)
POTASSIUM SERPL-MCNC: 3.6 MMOL/L — SIGNIFICANT CHANGE UP (ref 3.5–5.3)
POTASSIUM SERPL-SCNC: 3.6 MMOL/L — SIGNIFICANT CHANGE UP (ref 3.5–5.3)
PROT SERPL-MCNC: 6.1 GM/DL — SIGNIFICANT CHANGE UP (ref 6–8.3)
RBC # BLD: 4.81 M/UL — SIGNIFICANT CHANGE UP (ref 4.2–5.8)
RBC # FLD: 13.6 % — SIGNIFICANT CHANGE UP (ref 10.3–14.5)
S AUREUS DNA NOSE QL NAA+PROBE: SIGNIFICANT CHANGE UP
SODIUM SERPL-SCNC: 132 MMOL/L — LOW (ref 135–145)
T4 FREE SERPL-MCNC: 2 NG/DL — HIGH (ref 0.9–1.8)
TSH SERPL-MCNC: 0.77 UIU/ML — SIGNIFICANT CHANGE UP (ref 0.36–3.74)
WBC # BLD: 13.08 K/UL — HIGH (ref 3.8–10.5)
WBC # FLD AUTO: 13.08 K/UL — HIGH (ref 3.8–10.5)

## 2023-10-04 PROCEDURE — 99232 SBSQ HOSP IP/OBS MODERATE 35: CPT

## 2023-10-04 PROCEDURE — 99233 SBSQ HOSP IP/OBS HIGH 50: CPT

## 2023-10-04 RX ORDER — INSULIN GLARGINE 100 [IU]/ML
18 INJECTION, SOLUTION SUBCUTANEOUS AT BEDTIME
Refills: 0 | Status: DISCONTINUED | OUTPATIENT
Start: 2023-10-04 | End: 2023-10-06

## 2023-10-04 RX ORDER — INSULIN LISPRO 100/ML
6 VIAL (ML) SUBCUTANEOUS
Refills: 0 | Status: DISCONTINUED | OUTPATIENT
Start: 2023-10-04 | End: 2023-10-06

## 2023-10-04 RX ADMIN — Medication 12.5 MILLIGRAM(S): at 05:42

## 2023-10-04 RX ADMIN — CLOPIDOGREL BISULFATE 75 MILLIGRAM(S): 75 TABLET, FILM COATED ORAL at 11:19

## 2023-10-04 RX ADMIN — BUMETANIDE 2 MILLIGRAM(S): 0.25 INJECTION INTRAMUSCULAR; INTRAVENOUS at 13:24

## 2023-10-04 RX ADMIN — ENOXAPARIN SODIUM 40 MILLIGRAM(S): 100 INJECTION SUBCUTANEOUS at 05:41

## 2023-10-04 RX ADMIN — Medication 81 MILLIGRAM(S): at 11:19

## 2023-10-04 RX ADMIN — PANTOPRAZOLE SODIUM 40 MILLIGRAM(S): 20 TABLET, DELAYED RELEASE ORAL at 05:41

## 2023-10-04 RX ADMIN — ATORVASTATIN CALCIUM 80 MILLIGRAM(S): 80 TABLET, FILM COATED ORAL at 21:38

## 2023-10-04 RX ADMIN — CEFEPIME 100 MILLIGRAM(S): 1 INJECTION, POWDER, FOR SOLUTION INTRAMUSCULAR; INTRAVENOUS at 17:31

## 2023-10-04 RX ADMIN — Medication 8: at 16:58

## 2023-10-04 RX ADMIN — Medication 5 UNIT(S): at 11:18

## 2023-10-04 RX ADMIN — PANTOPRAZOLE SODIUM 40 MILLIGRAM(S): 20 TABLET, DELAYED RELEASE ORAL at 17:18

## 2023-10-04 RX ADMIN — Medication 12.5 MILLIGRAM(S): at 17:20

## 2023-10-04 RX ADMIN — Medication 10: at 11:18

## 2023-10-04 RX ADMIN — INSULIN GLARGINE 18 UNIT(S): 100 INJECTION, SOLUTION SUBCUTANEOUS at 21:37

## 2023-10-04 RX ADMIN — Medication 6: at 07:58

## 2023-10-04 RX ADMIN — Medication 5 UNIT(S): at 07:58

## 2023-10-04 RX ADMIN — CEFEPIME 100 MILLIGRAM(S): 1 INJECTION, POWDER, FOR SOLUTION INTRAMUSCULAR; INTRAVENOUS at 06:50

## 2023-10-04 RX ADMIN — Medication 5 UNIT(S): at 16:57

## 2023-10-04 RX ADMIN — Medication 2: at 21:37

## 2023-10-04 RX ADMIN — BUMETANIDE 2 MILLIGRAM(S): 0.25 INJECTION INTRAMUSCULAR; INTRAVENOUS at 05:41

## 2023-10-04 RX ADMIN — Medication 650 MILLIGRAM(S): at 17:18

## 2023-10-04 RX ADMIN — CHLORHEXIDINE GLUCONATE 1 APPLICATION(S): 213 SOLUTION TOPICAL at 05:46

## 2023-10-04 NOTE — PROGRESS NOTE ADULT - SUBJECTIVE AND OBJECTIVE BOX
Patient is a 58y old  Male who presents with a chief complaint of CHF Exacerbation (04 Oct 2023 10:52)    INTERVAL HX:  Patient seen and examined @ bedside sitting up, on nasal canula.  No respiratory distress, chest pain or SOB.     PAST MEDICAL & SURGICAL HISTORY:  CVA (cerebral vascular accident)    HTN (hypertension)    DM (diabetes mellitus)    HTN (hypertension)    CVA (cerebrovascular accident)    HTN (hypertension)    DM (diabetes mellitus)    HTN (hypertension)    S/P hernia repair  right inguinal hernia 1992    S/P cystoscopy  2012    MEDICATIONS:  MEDICATIONS  (STANDING):  aspirin enteric coated 81 milliGRAM(s) Oral daily  atorvastatin 80 milliGRAM(s) Oral at bedtime  buMETAnide Injectable 2 milliGRAM(s) IV Push two times a day  cefepime   IVPB 2000 milliGRAM(s) IV Intermittent every 12 hours  cefepime   IVPB      chlorhexidine 2% Cloths 1 Application(s) Topical <User Schedule>  clopidogrel Tablet 75 milliGRAM(s) Oral daily  enoxaparin Injectable 40 milliGRAM(s) SubCutaneous every 24 hours  influenza   Vaccine 0.5 milliLiter(s) IntraMuscular once  insulin glargine Injectable (LANTUS) 16 Unit(s) SubCutaneous at bedtime  insulin lispro (ADMELOG) corrective regimen sliding scale   SubCutaneous three times a day before meals  insulin lispro (ADMELOG) corrective regimen sliding scale   SubCutaneous at bedtime  insulin lispro Injectable (ADMELOG) 5 Unit(s) SubCutaneous three times a day before meals  metoprolol succinate ER 12.5 milliGRAM(s) Oral two times a day  pantoprazole  Injectable 40 milliGRAM(s) IV Push two times a day    MEDICATIONS  (PRN):  acetaminophen     Tablet .. 650 milliGRAM(s) Oral every 6 hours PRN Temp greater or equal to 38C (100.4F), Mild Pain (1 - 3)  albuterol    0.083% 2.5 milliGRAM(s) Nebulizer every 6 hours PRN Shortness of Breath and/or Wheezing  dextrose Oral Gel 15 Gram(s) Oral once PRN Blood Glucose LESS THAN 70 milliGRAM(s)/deciliter    Vitals:  T(F): 99.8 (10-04-23 @ 10:22), Max: 101.3 (10-03-23 @ 20:47)  HR: 101 (10-04-23 @ 10:22) (98 - 108)  BP: 102/55 (10-04-23 @ 10:22) (102/55 - 126/83)  RR: 18 (10-04-23 @ 10:22) (17 - 18)  SpO2: 96% (10-04-23 @ 10:22) (96% - 98%)    10-03 @ 07:01  -  10-04 @ 07:00  --------------------------------------------------------  IN:    Oral Fluid: 240 mL  Total IN: 240 mL    OUT:    Indwelling Catheter - Urethral (mL): 500 mL    Stool (mL): 1 mL    Voided (mL): 1000 mL  Total OUT: 1501 mL    Total NET: -1261 mL    10-04 @ 07:01  -  10-04 @ 11:07  --------------------------------------------------------  IN:    Oral Fluid: 360 mL  Total IN: 360 mL    OUT:    Indwelling Catheter - Urethral (mL): 500 mL  Total OUT: 500 mL    Total NET: -140 mL    PHYSICAL EXAM:  General: Awake, responsive  CV: S1 S2 RRR, Left upper chest ICD, no edema, no ecchymosis   Lungs: CTABL  GI: Soft, BS +, ND, NT  Extremities: No edema    TELEMETRY: sinus tach in the 110s   	    LABS:	 	    04 Oct 2023 07:47    132    |  99     |  29     ----------------------------<  309    3.6     |  26     |  1.81   03 Oct 2023 07:37    133    |  99     |  29     ----------------------------<  207    4.0     |  26     |  1.70   02 Oct 2023 08:29    134    |  98     |  32     ----------------------------<  238    4.1     |  30     |  1.73     Ca    8.1        04 Oct 2023 07:47  Phos  3.2       04 Oct 2023 07:47  Mg     2.1       04 Oct 2023 07:47    TPro  6.1    /  Alb  2.1    /  TBili  0.5    /  DBili  x      /  AST  24     /  ALT  83     /  AlkPhos  56     04 Oct 2023 07:47                        12.3   13.08 )-----------( 336      ( 04 Oct 2023 07:47 )             37.9 ,                       12.9   13.94 )-----------( 309      ( 03 Oct 2023 07:37 )             40.7 ,                       13.8   11.37 )-----------( 305      ( 02 Oct 2023 08:29 )             42.8     TSH: Thyroid Stimulating Hormone, Serum: 0.771 uIU/mL (10-04 @ 07:47)

## 2023-10-04 NOTE — PROGRESS NOTE ADULT - ASSESSMENT
58M w PMHx  CAD, CVA, DM (suboptimal compliance, checks FS Q4days though a1c <7) PPM/AICD for bradycardia/Mobitz II, LAD- thyroid tissue in retrospect,  now presenting with pneumonia, CHF, NSTEMI, renal insufficiency. Patient treated for CAP, still febrile though at lease at present hyperthermia blanket could have been playing a role.   Seems out of proportion to attribute to rhinovirus/enterovirus  More extensive pneumonia has a more delayed response to therapy  No RF for resistant fredi elicited  Legionella negative  AICD site benign, cx NTD  Exam otherwise unrevealing for acute infectious process.   COVID-negative x2  Blood cx NTD  TSH not suppressed    10/2: pt is feeling better, however, continues having fevers, had a fever of 101.6  at midnight and 101.2 today midday, on NC, WBC without significant change 11.37, Cr is about the same 1.73, BCs and UCs with no growth to date, abx were changed to cefepime and vanco over the weekend, Cefepime IV continued, Vancomycin stopped today.   Pt will need re-imaging to evaluate for the source of pt's fevers.   Attending Addendum--  Case reviewed with CIARRA Wall. Her note reviewed and modified as appropriate.   Still febrile, though trend has been better  CT's as above    10/3: the pt is somewhat forgetful, low grade temps overnight and this morning, on 3L NC and comfortable, WBC elevated, 13.94, Cr about the same as yesterday, yesterday's CT c/a/p - Hazy groundglass opacities in the upper lobes. Mild to moderate bilateral pleural effusions with overlying compressive atelectasis. Differential considerations include edema and/or infection. Unremarkable, unenhanced CT of the abdomen and pelvis.  Cefepime IV continued   Attending Addendum--  Case reviewed with CIARRA Wall. Her note reviewed and modified as appropriate.   Patient personally assessed and examined.  CT's personally reviewed  Perhaps some moderation in fever curve  Continue cefepime- too early to expect complete response to rx vs bronchiectasis/pneumonia    10/4: had elevated temperatures yesterday evening, none overnight, remains on NC, comfortable, WBC is about the same as yesterday, Cr elevated, will continue with Cefepime IV and will monitor for response.     Suggestions--  MRSA nares PCR - negative   Standard precautions  Continue cefepime IV  CT c/a/p - reviewed   follow culture data  trend temperatures and WBC  monitor pt's renal function   HIV test - negative  ANCA negative   ESR 43  RF <10  wean off supplemental O2 as able    discussed with Dr. Cornejo

## 2023-10-04 NOTE — PROGRESS NOTE ADULT - SUBJECTIVE AND OBJECTIVE BOX
Patient is a 58y old  Male who presents with a chief complaint of CHF Exacerbation (04 Oct 2023 18:11)      Interval History: fluctuating blood glucose on Lantus and prandial lispro and Lispro sliding scale coverage with meals and abnormal  thyroid function tests     MEDICATIONS  (STANDING):  aspirin enteric coated 81 milliGRAM(s) Oral daily  atorvastatin 80 milliGRAM(s) Oral at bedtime  buMETAnide Injectable 2 milliGRAM(s) IV Push two times a day  cefepime   IVPB 2000 milliGRAM(s) IV Intermittent every 12 hours  cefepime   IVPB      chlorhexidine 2% Cloths 1 Application(s) Topical <User Schedule>  clopidogrel Tablet 75 milliGRAM(s) Oral daily  enoxaparin Injectable 40 milliGRAM(s) SubCutaneous every 24 hours  influenza   Vaccine 0.5 milliLiter(s) IntraMuscular once  insulin glargine Injectable (LANTUS) 18 Unit(s) SubCutaneous at bedtime  insulin lispro (ADMELOG) corrective regimen sliding scale   SubCutaneous three times a day before meals  insulin lispro (ADMELOG) corrective regimen sliding scale   SubCutaneous at bedtime  insulin lispro Injectable (ADMELOG) 6 Unit(s) SubCutaneous three times a day before meals  metoprolol succinate ER 12.5 milliGRAM(s) Oral two times a day  pantoprazole  Injectable 40 milliGRAM(s) IV Push two times a day    MEDICATIONS  (PRN):  acetaminophen     Tablet .. 650 milliGRAM(s) Oral every 6 hours PRN Temp greater or equal to 38C (100.4F), Mild Pain (1 - 3)  albuterol    0.083% 2.5 milliGRAM(s) Nebulizer every 6 hours PRN Shortness of Breath and/or Wheezing  dextrose Oral Gel 15 Gram(s) Oral once PRN Blood Glucose LESS THAN 70 milliGRAM(s)/deciliter      Allergies    No Known Allergies    Intolerances        REVIEW OF SYSTEMS:  CONSTITUTIONAL: no changes  EYES: No eye pain, visual disturbances, or discharge  ENMT:  No difficulty hearing, No sinus or throat pain  NECK: No pain or stiffness  RESPIRATORY: No cough, wheezing, chills or hemoptysis; No shortness of breath  CARDIOVASCULAR: No chest pain, palpitations or leg swelling  GASTROINTESTINAL: No abdominal or epigastric pain. No nausea, vomiting, or hematemesis; No diarrhea or constipation. No melena or hematochezia.  GENITOURINARY: No dysuria, frequency, hematuria, or incontinence  NEUROLOGICAL: No headaches, memory loss, loss of strength, numbness, or tremors  SKIN: No itching, burning, rashes, or lesions   ENDOCRINE: No heat or cold intolerance; No hair loss  MUSCULOSKELETAL: No joint pain or swelling; No muscle, back, or extremity pain  PSYCHIATRIC: No depression, anxiety, mood swings, or difficulty sleeping  HEME/LYMPH: No easy bruising, or bleeding gums  ALLERY AND IMMUNOLOGIC: No hives or eczema    Vital Signs Last 24 Hrs  T(C): 38.1 (04 Oct 2023 15:48), Max: 38.1 (04 Oct 2023 15:48)  T(F): 100.6 (04 Oct 2023 15:48), Max: 100.6 (04 Oct 2023 15:48)  HR: 104 (04 Oct 2023 15:48) (98 - 105)  BP: 105/69 (04 Oct 2023 15:48) (102/55 - 126/83)  BP(mean): --  RR: 18 (04 Oct 2023 15:48) (17 - 18)  SpO2: 94% (04 Oct 2023 15:48) (94% - 98%)    Parameters below as of 04 Oct 2023 15:48  Patient On (Oxygen Delivery Method): nasal cannula  O2 Flow (L/min): 3      PHYSICAL EXAM:  GENERAL:   HEAD: Atraumatic, Normocephalic  EYES: PERRLA, conjunctiva and sclera clear  ENMT: No  exudates,; Moist mucous membranes,, No lesions  NECK: Supple, No JVD, Normal thyroid  NERVOUS SYSTEM:  Alert & Oriented,   CHEST/LUNG: Clear to auscultation bilaterally; No rales, rhonchi, wheezing, or rubs  HEART: Regular rate and rhythm; No murmurs, rubs, or gallops  ABDOMEN: Soft, Nontender, Nondistended; Bowel sounds present  EXTREMITIES:  2+ Peripheral Pulses, no edema  SKIN: No rashes or lesions    LABS:      Urinalysis Basic - ( 04 Oct 2023 07:47 )    Color: x / Appearance: x / SG: x / pH: x  Gluc: 309 mg/dL / Ketone: x  / Bili: x / Urobili: x   Blood: x / Protein: x / Nitrite: x   Leuk Esterase: x / RBC: x / WBC x   Sq Epi: x / Non Sq Epi: x / Bacteria: x      CAPILLARY BLOOD GLUCOSE      POCT Blood Glucose.: 344 mg/dL (04 Oct 2023 21:27)  POCT Blood Glucose.: 304 mg/dL (04 Oct 2023 16:36)  POCT Blood Glucose.: 367 mg/dL (04 Oct 2023 10:54)  POCT Blood Glucose.: 296 mg/dL (04 Oct 2023 07:33)    Lipid panel:           Thyroid:10-04 @ 07:47 TSH 0.771 <<Free T4>> 2.0 <<T3>> -- <<TG Ab>> -- <<ATPOAB>> -- <<TBG>> -- <<TSI>> -- Reverse T3 --    Diabetes Tests:  Parathyroid Panel:  Adrenals:  RADIOLOGY & ADDITIONAL TESTS:    Imaging Personally Reviewed:  [ ] YES  [ ] NO    Consultant(s) Notes Reviewed:  [ ] YES  [ ] NO    Care Discussed with Consultants/Other Providers [ ] YES  [ ] NO

## 2023-10-04 NOTE — PROGRESS NOTE ADULT - SUBJECTIVE AND OBJECTIVE BOX
[facility] Division of Hospital Medicine  Catherine Kelley MD  Available via MS Teams  Pager: [phone]    SUBJECTIVE / OVERNIGHT EVENTS:  - no events overnight, states he fells okay overall. has no acute complaints. denies n/v/abd pain/cp/cough/rhinorrhea/urinary issues.     MEDICATIONS  (STANDING):  aspirin enteric coated 81 milliGRAM(s) Oral daily  atorvastatin 80 milliGRAM(s) Oral at bedtime  buMETAnide Injectable 2 milliGRAM(s) IV Push two times a day  cefepime   IVPB 2000 milliGRAM(s) IV Intermittent every 12 hours  cefepime   IVPB      chlorhexidine 2% Cloths 1 Application(s) Topical <User Schedule>  clopidogrel Tablet 75 milliGRAM(s) Oral daily  enoxaparin Injectable 40 milliGRAM(s) SubCutaneous every 24 hours  influenza   Vaccine 0.5 milliLiter(s) IntraMuscular once  insulin glargine Injectable (LANTUS) 18 Unit(s) SubCutaneous at bedtime  insulin lispro (ADMELOG) corrective regimen sliding scale   SubCutaneous at bedtime  insulin lispro (ADMELOG) corrective regimen sliding scale   SubCutaneous three times a day before meals  insulin lispro Injectable (ADMELOG) 6 Unit(s) SubCutaneous three times a day before meals  metoprolol succinate ER 12.5 milliGRAM(s) Oral two times a day  pantoprazole  Injectable 40 milliGRAM(s) IV Push two times a day    MEDICATIONS  (PRN):  acetaminophen     Tablet .. 650 milliGRAM(s) Oral every 6 hours PRN Temp greater or equal to 38C (100.4F), Mild Pain (1 - 3)  albuterol    0.083% 2.5 milliGRAM(s) Nebulizer every 6 hours PRN Shortness of Breath and/or Wheezing  dextrose Oral Gel 15 Gram(s) Oral once PRN Blood Glucose LESS THAN 70 milliGRAM(s)/deciliter      I&O's Summary    03 Oct 2023 07:01  -  04 Oct 2023 07:00  --------------------------------------------------------  IN: 240 mL / OUT: 1501 mL / NET: -1261 mL    04 Oct 2023 07:01  -  04 Oct 2023 18:11  --------------------------------------------------------  IN: 360 mL / OUT: 1700 mL / NET: -1340 mL        PHYSICAL EXAM:  Vital Signs Last 24 Hrs  T(C): 38.1 (04 Oct 2023 15:48), Max: 38.5 (03 Oct 2023 20:47)  T(F): 100.6 (04 Oct 2023 15:48), Max: 101.3 (03 Oct 2023 20:47)  HR: 104 (04 Oct 2023 15:48) (98 - 108)  BP: 105/69 (04 Oct 2023 15:48) (102/55 - 126/83)  BP(mean): --  RR: 18 (04 Oct 2023 15:48) (17 - 18)  SpO2: 94% (04 Oct 2023 15:48) (94% - 98%)    Parameters below as of 04 Oct 2023 15:48  Patient On (Oxygen Delivery Method): nasal cannula  O2 Flow (L/min): 3    PHYSICAL EXAM:  GENERAL: NAD, well-groomed, well-developed  HEAD:  Atraumatic, Normocephalic  EYES: EOMI, PERRLA   NECK: Supple   NERVOUS SYSTEM:  Alert & Oriented X3, Good concentration   CHEST/LUNG: Clear to auscultation bilaterally; No rales, rhonchi, wheezing, or rubs  HEART: Regular rate and rhythm; No murmurs, rubs, or gallops  ABDOMEN: Soft, Nontender, Nondistended; Bowel sounds present  EXTREMITIES: No clubbing, cyanosis, or edema    LABS:                        12.3   13.08 )-----------( 336      ( 04 Oct 2023 07:47 )             37.9     10-04    132<L>  |  99  |  29<H>  ----------------------------<  309<H>  3.6   |  26  |  1.81<H>    Ca    8.1<L>      04 Oct 2023 07:47  Phos  3.2     10-04  Mg     2.1     10-04    TPro  6.1  /  Alb  2.1<L>  /  TBili  0.5  /  DBili  x   /  AST  24  /  ALT  83<H>  /  AlkPhos  56  10-04          Urinalysis Basic - ( 04 Oct 2023 07:47 )    Color: x / Appearance: x / SG: x / pH: x  Gluc: 309 mg/dL / Ketone: x  / Bili: x / Urobili: x   Blood: x / Protein: x / Nitrite: x   Leuk Esterase: x / RBC: x / WBC x   Sq Epi: x / Non Sq Epi: x / Bacteria: x        SARS-CoV-2: NotDetec (28 Sep 2023 12:40)  SARS-CoV-2: NotDetec (26 Sep 2023 00:39)      RADIOLOGY & ADDITIONAL TESTS:  New Results Reviewed Today: cbc bmp mg phos  New Imaging Personally Reviewed Today:  New Electrocardiogram Personally Reviewed Today:  Prior or Outpatient Records Reviewed Today:    COMMUNICATION:  Care Discussed with Consultants/Other Providers and Details of Discussion:  Discussions with Patient/Family: wife at bedside   PCP Communication:

## 2023-10-04 NOTE — PROGRESS NOTE ADULT - ASSESSMENT
59 yo m pmhx CVA with mild left sided deficits, HTN, DM2, vertigo, HLD, Mobitz II s/pp [employer] dual chamber ICD (12/21/22) biba from home with complaints of dyspnea and chest pain and was admitted w/ acute hypoxic respiratory failure likely due to acute pulmonary edema due to acute HFrEF in setting of acute NSTEMI, LAURA and enterovirus infection.     Acute hypoxic respiratory failure due to acute sys/ diastolic CHF  - CT on admission showed patchy and confluent ground-glass and consolidative opacities primarily in the bilateral upper and lower lobes c/w a combination of infection and edema and small bilateral pleural effusions  - patient has improved w/ Lasix and Bipap, now on NC  - Echo showed EF 20% w/ severely decreased global left ventricular systolic function w/ multiple left ventricular regional wall motion abnormalities & grade II diastolic dysfunction.   - c/w Bumex & add metoprolol     Elevated Trop  - likely due to MI  - trop peaked at 24,293  - as per cardio, will likely need transfer for cardiac catheterization, once infectious symptoms resolve (still with fevers now)  - c/w ASA, Plavix & Lipitor  - heparin gtt stopped in ICU    lactic acidosis  - lactate 2.9 on admission  - likely due to hypoxia      Fevers  - likely due to PNA as CT showed hazy groundglass opacities in the upper lobes w/ mild to moderate bilateral pleural effusions with overlying compressive atelectasis  - s/p Ceftriaxone in ICU    - as per ID, stopped vanc stopped  - c/w Cefepime  - Sputum + on 9/29 gram+ cocci in pairs, gram -rods and gram - coccobacilli     - as per ID, will check HIV, ESR, HAIDER, ANCA, RF     Urinary retention   - Douglass placed 9/30 after multiple straight caths  - urology following     LAURA  - ATN vs cardiorenal  - Cr stable     HTN  - stop IV hydralazine & add metoprolol     DM  - c/w Lantus, mealtime lispro and ISS  - hgb A1C is 6.9    Possible Hyperthyroidism  - CT showed goiter  - TSH is 0.258 but free T4 is 2, repeat tsh wnl    Prophylaxis:  DVT: Lovenox  GI: Protonix     Dispo: [facility]

## 2023-10-04 NOTE — PROGRESS NOTE ADULT - ASSESSMENT
59 yo m pmhx CVA with mild left sided deficits, HTN, DM2, vertigo, HLD, Mobitz II s/pp Medtronic dual chamber ICD (12/21/22) biba from home with complaints of dyspnea and chest pain and was admitted w/ acute hypoxic respiratory failure likely due to acute pulmonary edema due to acute HFrEF in setting of acute NSTEMI, LAURA and enterovirus infection. He is lying in bed in NAD.    Echo: 9/26/23 LVEF 20%. +WMA. Grade 2 DD.  Global longitudinal strain using [employer] software at a HR of 121bpm and /99 is -5.1%, which is severely reduced. There is some sparing of the basal segments.    1) Acute Hypoxic Respiratory Failure 2ndary Acute HFrEF and +entero/rhinovirus/ +Pseudomonas  2) NSTEMI (troponin 24,293)  3) Cardiomyopathy (LVEF 20%) s/p ICD ([employer], DDD, 12/21/22)  4) HTN  5) DM-2    - patient appears comfortable, no chest pain, SOB, breathing improved, still needs 4L NC oxygen saturation 94%  - tele reviewed: paced 110s  - Echo reviewed +WMA, LVEF 20%  - c/w ASA/Plavix  - c/w Bumex 2mg IV BID, Toprol XL 12.5mg BID, will optimize GDMT when bp and renal function improves  - c/w strict I&O, maintain negative balance, daily weight  - Antibiotics as per primary team, patient having persistent fevers past 24 hours, last temp 100.2F at midnight, when infection resolves and cultures negative, will then arrange for transfer for cardiac cath.

## 2023-10-04 NOTE — PROGRESS NOTE ADULT - SUBJECTIVE AND OBJECTIVE BOX
BRANDEN MARRUFO  MRN-[id number]    Follow Up:  PNA    Interval History: the pt was seen and examined earlier, no acute distress, feeling better, continues having elevated temperatures, none overnight.     PAST MEDICAL & SURGICAL HISTORY:  CVA (cerebral vascular accident)      HTN (hypertension)      DM (diabetes mellitus)      HTN (hypertension)      CVA (cerebrovascular accident)      HTN (hypertension)      DM (diabetes mellitus)      HTN (hypertension)      S/P hernia repair  right inguinal hernia 1992      S/P cystoscopy  2012          ROS:    [ ] Unobtainable because:  [x ] All other systems negative    Constitutional: no fever, no chills overnight   Head: no trauma  Eyes: no vision changes, no eye pain  ENT:  no sore throat, no rhinorrhea  Cardiovascular:  no chest pain, no palpitation  Respiratory:  no SOB with supplemental O2, no cough  GI:  no abd pain, no vomiting, no diarrhea  urinary: no dysuria, no hematuria, no flank pain  musculoskeletal:  no joint pain, no joint swelling  skin:  no rash  neurology:  no headache, no seizure, no change in mental status  psych: no anxiety, no depression         Allergies  No Known Allergies        ANTIMICROBIALS:  cefepime   IVPB    cefepime   IVPB 2000 every 12 hours      OTHER MEDS:  acetaminophen     Tablet .. 650 milliGRAM(s) Oral every 6 hours PRN  albuterol    0.083% 2.5 milliGRAM(s) Nebulizer every 6 hours PRN  aspirin enteric coated 81 milliGRAM(s) Oral daily  atorvastatin 80 milliGRAM(s) Oral at bedtime  buMETAnide Injectable 2 milliGRAM(s) IV Push two times a day  chlorhexidine 2% Cloths 1 Application(s) Topical <User Schedule>  clopidogrel Tablet 75 milliGRAM(s) Oral daily  dextrose Oral Gel 15 Gram(s) Oral once PRN  enoxaparin Injectable 40 milliGRAM(s) SubCutaneous every 24 hours  influenza   Vaccine 0.5 milliLiter(s) IntraMuscular once  insulin glargine Injectable (LANTUS) 16 Unit(s) SubCutaneous at bedtime  insulin lispro (ADMELOG) corrective regimen sliding scale   SubCutaneous three times a day before meals  insulin lispro (ADMELOG) corrective regimen sliding scale   SubCutaneous at bedtime  insulin lispro Injectable (ADMELOG) 5 Unit(s) SubCutaneous three times a day before meals  metoprolol succinate ER 12.5 milliGRAM(s) Oral two times a day  pantoprazole  Injectable 40 milliGRAM(s) IV Push two times a day      Vital Signs Last 24 Hrs  T(C): 37.7 (04 Oct 2023 10:22), Max: 38.5 (03 Oct 2023 20:47)  T(F): 99.8 (04 Oct 2023 10:22), Max: 101.3 (03 Oct 2023 20:47)  HR: 101 (04 Oct 2023 10:22) (98 - 108)  BP: 102/55 (04 Oct 2023 10:22) (102/55 - 126/83)  BP(mean): --  RR: 18 (04 Oct 2023 10:22) (17 - 18)  SpO2: 96% (04 Oct 2023 10:22) (96% - 98%)    Parameters below as of 04 Oct 2023 05:22  Patient On (Oxygen Delivery Method): nasal cannula        Physical Exam:  General: Nontoxic-appearing Male in no acute distress. NC  HEENT: AT/NC. Anicteric. Conjunctiva pink and moist. Oropharynx clear.  Neck: Not rigid. No sense of mass.  Nodes: None palpable.  Chest: AICD site benign  Lungs: Diminished BS Bilaterally, no wheezes, no rhonchi   Heart: Regular rate and rhythm. No Murmur. No rub. No gallop. No palpable thrill.  Abdomen: Bowel sounds present and normoactive. Soft. Nondistended. Nontender. No sense of mass. No organomegaly.  Back: No spinal tenderness. No costovertebral angle tenderness.   Extremities: No cyanosis or clubbing. No edema.   Skin: Warm. Dry. Good turgor. No rash. No vasculitic stigmata.  Psychiatric: Appropriate affect and mood for situation.     WBC Count: 13.08 K/uL (10-04 @ 07:47)  WBC Count: 13.94 K/uL (10-03 @ 07:37)  WBC Count: 11.37 K/uL (10-02 @ 08:29)  WBC Count: 11.71 K/uL (10-01 @ 07:20)  WBC Count: 11.01 K/uL (09-30 @ 06:35)  WBC Count: 10.85 K/uL (09-29 @ 07:00)  WBC Count: 12.52 K/uL (09-28 @ 06:40)                            12.3   13.08 )-----------( 336      ( 04 Oct 2023 07:47 )             37.9       10-04    132<L>  |  99  |  29<H>  ----------------------------<  309<H>  3.6   |  26  |  1.81<H>    Ca    8.1<L>      04 Oct 2023 07:47  Phos  3.2     10-04  Mg     2.1     10-04    TPro  6.1  /  Alb  2.1<L>  /  TBili  0.5  /  DBili  x   /  AST  24  /  ALT  83<H>  /  AlkPhos  56  10-04      Urinalysis Basic - ( 04 Oct 2023 07:47 )    Color: x / Appearance: x / SG: x / pH: x  Gluc: 309 mg/dL / Ketone: x  / Bili: x / Urobili: x   Blood: x / Protein: x / Nitrite: x   Leuk Esterase: x / RBC: x / WBC x   Sq Epi: x / Non Sq Epi: x / Bacteria: x        Creatinine Trend: 1.81<--, 1.70<--, 1.73<--, 1.76<--, 1.84<--, 1.80<--      MICROBIOLOGY:  v  .Stool Feces  10-01-23   No enteric pathogens isolated.  (Stool culture examined for Salmonella,  Shigella, Campylobacter, Aeromonas, Plesiomonas,  Vibrio, E.coli O157 and Yersinia)  --  --      Clean Catch Clean Catch (Midstream)  09-30-23   No growth  --  --      .Blood Blood  09-30-23   No growth at 72 Hours  --  --      .Blood Blood  09-30-23   No growth at 72 Hours  --  --      .Sputum Sputum  09-28-23   Rare Pseudomonas aeruginosa  Normal Respiratory Tamela present  --  Pseudomonas aeruginosa      .Blood Blood  09-27-23   No growth at 5 days  --  --      .Blood Blood  09-27-23   No growth at 5 days  --  --      .Blood Blood  09-27-23   No growth at 5 days  --  --      .Blood Blood  09-26-23   No growth at 5 days  --  --      Clean Catch Clean Catch (Midstream)  09-26-23   <10,000 CFU/mL Normal Urogenital Tamela  --  --          Rapid RVP Result: Detected (09-28 @ 12:40)        C-Reactive Protein, Serum: 29 (10-01)  C-Reactive Protein, Serum: 95 (09-26)          Procalcitonin, Serum: 0.34 (09-30-23 @ 14:05)  Procalcitonin, Serum: 0.54 (09-29-23 @ 03:55)      SARS-CoV-2: NotDetec (28 Sep 2023 12:40)  SARS-CoV-2: NotDetec (26 Sep 2023 00:39)    RADIOLOGY:

## 2023-10-05 LAB
ANION GAP SERPL CALC-SCNC: 6 MMOL/L — SIGNIFICANT CHANGE UP (ref 5–17)
BUN SERPL-MCNC: 29 MG/DL — HIGH (ref 7–23)
CALCIUM SERPL-MCNC: 7.9 MG/DL — LOW (ref 8.5–10.1)
CHLORIDE SERPL-SCNC: 101 MMOL/L — SIGNIFICANT CHANGE UP (ref 96–108)
CO2 SERPL-SCNC: 27 MMOL/L — SIGNIFICANT CHANGE UP (ref 22–31)
CREAT SERPL-MCNC: 1.67 MG/DL — HIGH (ref 0.5–1.3)
CULTURE RESULTS: SIGNIFICANT CHANGE UP
CULTURE RESULTS: SIGNIFICANT CHANGE UP
EGFR: 48 ML/MIN/1.73M2 — LOW
GLUCOSE BLDC GLUCOMTR-MCNC: 231 MG/DL — HIGH (ref 70–99)
GLUCOSE BLDC GLUCOMTR-MCNC: 235 MG/DL — HIGH (ref 70–99)
GLUCOSE BLDC GLUCOMTR-MCNC: 332 MG/DL — HIGH (ref 70–99)
GLUCOSE BLDC GLUCOMTR-MCNC: 351 MG/DL — HIGH (ref 70–99)
GLUCOSE SERPL-MCNC: 246 MG/DL — HIGH (ref 70–99)
HCT VFR BLD CALC: 37.1 % — LOW (ref 39–50)
HGB BLD-MCNC: 11.9 G/DL — LOW (ref 13–17)
MAGNESIUM SERPL-MCNC: 2.1 MG/DL — SIGNIFICANT CHANGE UP (ref 1.6–2.6)
MCHC RBC-ENTMCNC: 25.3 PG — LOW (ref 27–34)
MCHC RBC-ENTMCNC: 32.1 G/DL — SIGNIFICANT CHANGE UP (ref 32–36)
MCV RBC AUTO: 78.8 FL — LOW (ref 80–100)
NRBC # BLD: 0 /100 WBCS — SIGNIFICANT CHANGE UP (ref 0–0)
PHOSPHATE SERPL-MCNC: 3.1 MG/DL — SIGNIFICANT CHANGE UP (ref 2.5–4.5)
PLATELET # BLD AUTO: 352 K/UL — SIGNIFICANT CHANGE UP (ref 150–400)
POTASSIUM SERPL-MCNC: 3.5 MMOL/L — SIGNIFICANT CHANGE UP (ref 3.5–5.3)
POTASSIUM SERPL-SCNC: 3.5 MMOL/L — SIGNIFICANT CHANGE UP (ref 3.5–5.3)
RBC # BLD: 4.71 M/UL — SIGNIFICANT CHANGE UP (ref 4.2–5.8)
RBC # FLD: 13.7 % — SIGNIFICANT CHANGE UP (ref 10.3–14.5)
SODIUM SERPL-SCNC: 134 MMOL/L — LOW (ref 135–145)
SPECIMEN SOURCE: SIGNIFICANT CHANGE UP
SPECIMEN SOURCE: SIGNIFICANT CHANGE UP
T3 SERPL-MCNC: 64 NG/DL — LOW (ref 80–200)
T4 FREE SERPL-MCNC: 2 NG/DL — HIGH (ref 0.9–1.8)
TSH SERPL-MCNC: 0.8 UIU/ML — SIGNIFICANT CHANGE UP (ref 0.36–3.74)
WBC # BLD: 11.6 K/UL — HIGH (ref 3.8–10.5)
WBC # FLD AUTO: 11.6 K/UL — HIGH (ref 3.8–10.5)

## 2023-10-05 PROCEDURE — 99232 SBSQ HOSP IP/OBS MODERATE 35: CPT

## 2023-10-05 PROCEDURE — 99233 SBSQ HOSP IP/OBS HIGH 50: CPT

## 2023-10-05 RX ADMIN — CEFEPIME 100 MILLIGRAM(S): 1 INJECTION, POWDER, FOR SOLUTION INTRAMUSCULAR; INTRAVENOUS at 18:47

## 2023-10-05 RX ADMIN — Medication 4: at 08:11

## 2023-10-05 RX ADMIN — PANTOPRAZOLE SODIUM 40 MILLIGRAM(S): 20 TABLET, DELAYED RELEASE ORAL at 18:46

## 2023-10-05 RX ADMIN — INSULIN GLARGINE 18 UNIT(S): 100 INJECTION, SOLUTION SUBCUTANEOUS at 22:02

## 2023-10-05 RX ADMIN — CEFEPIME 100 MILLIGRAM(S): 1 INJECTION, POWDER, FOR SOLUTION INTRAMUSCULAR; INTRAVENOUS at 06:40

## 2023-10-05 RX ADMIN — Medication 2: at 22:03

## 2023-10-05 RX ADMIN — ENOXAPARIN SODIUM 40 MILLIGRAM(S): 100 INJECTION SUBCUTANEOUS at 05:40

## 2023-10-05 RX ADMIN — Medication 12.5 MILLIGRAM(S): at 18:47

## 2023-10-05 RX ADMIN — PANTOPRAZOLE SODIUM 40 MILLIGRAM(S): 20 TABLET, DELAYED RELEASE ORAL at 05:40

## 2023-10-05 RX ADMIN — BUMETANIDE 2 MILLIGRAM(S): 0.25 INJECTION INTRAMUSCULAR; INTRAVENOUS at 05:40

## 2023-10-05 RX ADMIN — ATORVASTATIN CALCIUM 80 MILLIGRAM(S): 80 TABLET, FILM COATED ORAL at 21:34

## 2023-10-05 RX ADMIN — Medication 6 UNIT(S): at 11:33

## 2023-10-05 RX ADMIN — Medication 6 UNIT(S): at 17:10

## 2023-10-05 RX ADMIN — Medication 10: at 17:11

## 2023-10-05 RX ADMIN — CLOPIDOGREL BISULFATE 75 MILLIGRAM(S): 75 TABLET, FILM COATED ORAL at 11:32

## 2023-10-05 RX ADMIN — BUMETANIDE 2 MILLIGRAM(S): 0.25 INJECTION INTRAMUSCULAR; INTRAVENOUS at 14:31

## 2023-10-05 RX ADMIN — Medication 81 MILLIGRAM(S): at 11:32

## 2023-10-05 RX ADMIN — Medication 12.5 MILLIGRAM(S): at 05:41

## 2023-10-05 RX ADMIN — Medication 4: at 11:33

## 2023-10-05 RX ADMIN — CHLORHEXIDINE GLUCONATE 1 APPLICATION(S): 213 SOLUTION TOPICAL at 05:42

## 2023-10-05 RX ADMIN — Medication 6 UNIT(S): at 08:10

## 2023-10-05 NOTE — PROGRESS NOTE ADULT - ASSESSMENT
59 yo m pmhx CVA with mild left sided deficits, HTN, DM2, vertigo, HLD, Mobitz II s/pp Medtr[employer] dual chamber ICD (12/21/22) biba from home with complaints of dyspnea and chest pain and was admitted w/ acute hypoxic respiratory failure likely due to acute pulmonary edema due to acute HFrEF in setting of acute NSTEMI, LAURA and enterovirus infection.     Acute hypoxic respiratory failure due to acute sys/ diastolic CHF  - CT on admission showed patchy and confluent ground-glass and consolidative opacities primarily in the bilateral upper and lower lobes c/w a combination of infection and edema and small bilateral pleural effusions  - patient has improved w/ Lasix and Bipap, now on NC  - Echo showed EF 20% w/ severely decreased global left ventricular systolic function w/ multiple left ventricular regional wall motion abnormalities & grade II diastolic dysfunction.   - c/w Bumex & add metoprolol     Elevated Trop  - likely due to MI  - trop peaked at 24,293  - as per cardio, will likely need transfer for cardiac catheterization, once infectious symptoms resolve (still with fevers now)  - c/w ASA, Plavix & Lipitor  - heparin gtt stopped in ICU    lactic acidosis  - lactate 2.9 on admission  - likely due to hypoxia      Fevers  - likely due to PNA as CT showed hazy groundglass opacities in the upper lobes w/ mild to moderate bilateral pleural effusions with overlying compressive atelectasis  - s/p Ceftriaxone in ICU    - as per ID, stopped vanc stopped  - c/w Cefepime  - Sputum + on 9/29 gram+ cocci in pairs, gram -rods and gram - coccobacilli       Urinary retention   - Douglass placed 9/30 after multiple straight caths  - urology following     LAURA  - ATN vs cardiorenal  - Cr stable     HTN  - stop IV hydralazine & add metoprolol     DM  - c/w Lantus, mealtime lispro and ISS  - hgb A1C is 6.9    Possible Hyperthyroidism  - CT showed goiter  - TSH is 0.258 but free T4 is 2, repeat tsh wnl    Prophylaxis:  DVT: Lovenox  GI: Protonix     Dispo: [facility]

## 2023-10-05 NOTE — PROGRESS NOTE ADULT - ASSESSMENT
58M w PMHx  CAD, CVA, DM (suboptimal compliance, checks FS Q4days though a1c <7) PPM/AICD for bradycardia/Mobitz II, LAD- thyroid tissue in retrospect,  now presenting with pneumonia, CHF, NSTEMI, renal insufficiency. Patient treated for CAP, still febrile though at lease at present hyperthermia blanket could have been playing a role.   Seems out of proportion to attribute to rhinovirus/enterovirus  More extensive pneumonia has a more delayed response to therapy  No RF for resistant fredi elicited  Legionella negative  AICD site benign, cx NTD  Exam otherwise unrevealing for acute infectious process.   COVID-negative x2  Blood cx NTD  TSH not suppressed    10/2: pt is feeling better, however, continues having fevers, had a fever of 101.6  at midnight and 101.2 today midday, on NC, WBC without significant change 11.37, Cr is about the same 1.73, BCs and UCs with no growth to date, abx were changed to cefepime and vanco over the weekend, Cefepime IV continued, Vancomycin stopped today.   Pt will need re-imaging to evaluate for the source of pt's fevers.   Attending Addendum--  Case reviewed with CIARRA Wall. Her note reviewed and modified as appropriate.   Still febrile, though trend has been better  CT's as above    10/3: the pt is somewhat forgetful, low grade temps overnight and this morning, on 3L NC and comfortable, WBC elevated, 13.94, Cr about the same as yesterday, yesterday's CT c/a/p - Hazy groundglass opacities in the upper lobes. Mild to moderate bilateral pleural effusions with overlying compressive atelectasis. Differential considerations include edema and/or infection. Unremarkable, unenhanced CT of the abdomen and pelvis.  Cefepime IV continued   Attending Addendum--  Case reviewed with CIARRA Wall. Her note reviewed and modified as appropriate.   Patient personally assessed and examined.  CT's personally reviewed  Perhaps some moderation in fever curve  Continue cefepime- too early to expect complete response to rx vs bronchiectasis/pneumonia    10/4: had elevated temperatures yesterday evening, none overnight, remains on NC, comfortable, WBC is about the same as yesterday, Cr elevated, will continue with Cefepime IV and will monitor for response.   Attending Addendum--  Case reviewed with NP Mamta Wall. Her note reviewed and modified as appropriate.   Still with fever but magnitude and frequency have improved.  Continue cefepime for now  follow temp curve    10/5: no fevers since midday yesterday, NC, WBc better 11.6, Cr better 1.67, BCs remain with no growth to date, Cefepime IV continued.     Suggestions--  MRSA nares PCR - negative   Standard precautions  Continue cefepime IV  CT c/a/p - reviewed   follow culture data  trend temperatures and WBC  monitor pt's renal function   HIV test - negative  ANCA negative   ESR 43  RF <10  wean off supplemental O2 as able    will discuss with attending   discussed with the pt and his wife at the bedside

## 2023-10-05 NOTE — PROGRESS NOTE ADULT - SUBJECTIVE AND OBJECTIVE BOX
[facility] Division of Hospital Medicine  Catherine Kelley MD  Available via MS Teams  Pager: [phone]    SUBJECTIVE / OVERNIGHT EVENTS:  - no events overnight, denies any nausea, vomiting, headaches, shortness of breaht, chest pain, abdominal pain. Offers no complaints.     MEDICATIONS  (STANDING):  aspirin enteric coated 81 milliGRAM(s) Oral daily  atorvastatin 80 milliGRAM(s) Oral at bedtime  buMETAnide Injectable 2 milliGRAM(s) IV Push two times a day  cefepime   IVPB      cefepime   IVPB 2000 milliGRAM(s) IV Intermittent every 12 hours  chlorhexidine 2% Cloths 1 Application(s) Topical <User Schedule>  clopidogrel Tablet 75 milliGRAM(s) Oral daily  enoxaparin Injectable 40 milliGRAM(s) SubCutaneous every 24 hours  influenza   Vaccine 0.5 milliLiter(s) IntraMuscular once  insulin glargine Injectable (LANTUS) 18 Unit(s) SubCutaneous at bedtime  insulin lispro (ADMELOG) corrective regimen sliding scale   SubCutaneous three times a day before meals  insulin lispro (ADMELOG) corrective regimen sliding scale   SubCutaneous at bedtime  insulin lispro Injectable (ADMELOG) 6 Unit(s) SubCutaneous three times a day before meals  metoprolol succinate ER 12.5 milliGRAM(s) Oral two times a day  pantoprazole  Injectable 40 milliGRAM(s) IV Push two times a day    MEDICATIONS  (PRN):  acetaminophen     Tablet .. 650 milliGRAM(s) Oral every 6 hours PRN Temp greater or equal to 38C (100.4F), Mild Pain (1 - 3)  albuterol    0.083% 2.5 milliGRAM(s) Nebulizer every 6 hours PRN Shortness of Breath and/or Wheezing  dextrose Oral Gel 15 Gram(s) Oral once PRN Blood Glucose LESS THAN 70 milliGRAM(s)/deciliter      I&O's Summary    04 Oct 2023 07:01  -  05 Oct 2023 07:00  --------------------------------------------------------  IN: 360 mL / OUT: 2300 mL / NET: -1940 mL        PHYSICAL EXAM:  Vital Signs Last 24 Hrs  T(C): 37.5 (05 Oct 2023 10:37), Max: 38.1 (04 Oct 2023 15:48)  T(F): 99.5 (05 Oct 2023 10:37), Max: 100.6 (04 Oct 2023 15:48)  HR: 91 (05 Oct 2023 10:37) (91 - 104)  BP: 100/64 (05 Oct 2023 10:37) (100/64 - 105/69)  BP(mean): --  RR: 18 (05 Oct 2023 10:37) (18 - 18)  SpO2: 96% (05 Oct 2023 10:37) (94% - 96%)    Parameters below as of 05 Oct 2023 10:37  Patient On (Oxygen Delivery Method): nasal cannula  O2 Flow (L/min): 3    PHYSICAL EXAM:  GENERAL: NAD, well-groomed, well-developed  HEAD:  Atraumatic, Normocephalic  EYES: EOMI, PERRLA   NECK: Supple   NERVOUS SYSTEM:  Alert & Oriented X3, Good concentration   CHEST/LUNG: Clear to auscultation bilaterally; No rales, rhonchi, wheezing, or rubs  HEART: Regular rate and rhythm; No murmurs, rubs, or gallops  ABDOMEN: Soft, Nontender, Nondistended; Bowel sounds present  EXTREMITIES: No clubbing, cyanosis, or edema      LABS:                        11.9   11.60 )-----------( 352      ( 05 Oct 2023 08:20 )             37.1     10-05    134<L>  |  101  |  29<H>  ----------------------------<  246<H>  3.5   |  27  |  1.67<H>    Ca    7.9<L>      05 Oct 2023 08:20  Phos  3.1     10-05  Mg     2.1     10-05    TPro  6.1  /  Alb  2.1<L>  /  TBili  0.5  /  DBili  x   /  AST  24  /  ALT  83<H>  /  AlkPhos  56  10-04          Urinalysis Basic - ( 05 Oct 2023 08:20 )    Color: x / Appearance: x / SG: x / pH: x  Gluc: 246 mg/dL / Ketone: x  / Bili: x / Urobili: x   Blood: x / Protein: x / Nitrite: x   Leuk Esterase: x / RBC: x / WBC x   Sq Epi: x / Non Sq Epi: x / Bacteria: x        SARS-CoV-2: NotDetec (28 Sep 2023 12:40)  SARS-CoV-2: NotDetec (26 Sep 2023 00:39)      RADIOLOGY & ADDITIONAL TESTS:  New Results Reviewed Today:   New Imaging Personally Reviewed Today:  New Electrocardiogram Personally Reviewed Today:  Prior or Outpatient Records Reviewed Today:    COMMUNICATION:  Care Discussed with Consultants/Other Providers and Details of Discussion:  Discussions with Patient/Family:  PCP Communication:

## 2023-10-05 NOTE — PROGRESS NOTE ADULT - SUBJECTIVE AND OBJECTIVE BOX
BRANDEN MARRUFO  MRN-[id number]    Follow Up:  PNA    Interval History: the pt was seen and examined earlier, no acute distress, feeling better, pt's wife is present.     PAST MEDICAL & SURGICAL HISTORY:  CVA (cerebral vascular accident)      HTN (hypertension)      DM (diabetes mellitus)      HTN (hypertension)      CVA (cerebrovascular accident)      HTN (hypertension)      DM (diabetes mellitus)      HTN (hypertension)      S/P hernia repair  right inguinal hernia 1992      S/P cystoscopy  2012          ROS:    [ ] Unobtainable because:  [x ] All other systems negative    Constitutional: no fever, no chills  Head: no trauma  Eyes: no vision changes, no eye pain  ENT:  no sore throat, no rhinorrhea  Cardiovascular:  no chest pain, no palpitation  Respiratory:  no SOB, no cough  GI:  no abd pain, no vomiting, no diarrhea  urinary: no dysuria, no hematuria, no flank pain  musculoskeletal:  no joint pain, no joint swelling  skin:  no rash  neurology:  no headache, no seizure, no change in mental status  psych: no anxiety, no depression         Allergies  No Known Allergies        ANTIMICROBIALS:  cefepime   IVPB    cefepime   IVPB 2000 every 12 hours      OTHER MEDS:  acetaminophen     Tablet .. 650 milliGRAM(s) Oral every 6 hours PRN  albuterol    0.083% 2.5 milliGRAM(s) Nebulizer every 6 hours PRN  aspirin enteric coated 81 milliGRAM(s) Oral daily  atorvastatin 80 milliGRAM(s) Oral at bedtime  buMETAnide Injectable 2 milliGRAM(s) IV Push two times a day  chlorhexidine 2% Cloths 1 Application(s) Topical <User Schedule>  clopidogrel Tablet 75 milliGRAM(s) Oral daily  dextrose Oral Gel 15 Gram(s) Oral once PRN  enoxaparin Injectable 40 milliGRAM(s) SubCutaneous every 24 hours  influenza   Vaccine 0.5 milliLiter(s) IntraMuscular once  insulin glargine Injectable (LANTUS) 18 Unit(s) SubCutaneous at bedtime  insulin lispro (ADMELOG) corrective regimen sliding scale   SubCutaneous three times a day before meals  insulin lispro (ADMELOG) corrective regimen sliding scale   SubCutaneous at bedtime  insulin lispro Injectable (ADMELOG) 6 Unit(s) SubCutaneous three times a day before meals  metoprolol succinate ER 12.5 milliGRAM(s) Oral two times a day  pantoprazole  Injectable 40 milliGRAM(s) IV Push two times a day      Vital Signs Last 24 Hrs  T(C): 37.7 (05 Oct 2023 16:41), Max: 37.7 (05 Oct 2023 16:41)  T(F): 99.8 (05 Oct 2023 16:41), Max: 99.8 (05 Oct 2023 16:41)  HR: 89 (05 Oct 2023 16:41) (89 - 95)  BP: 105/70 (05 Oct 2023 16:41) (100/64 - 105/70)  BP(mean): --  RR: 19 (05 Oct 2023 16:41) (18 - 19)  SpO2: 98% (05 Oct 2023 16:41) (96% - 98%)    Parameters below as of 05 Oct 2023 16:41  Patient On (Oxygen Delivery Method): nasal cannula        Physical Exam:  General: Nontoxic-appearing Male in no acute distress. NC  HEENT: AT/NC. Anicteric. Conjunctiva pink and moist. Oropharynx clear.  Neck: Not rigid. No sense of mass.  Nodes: None palpable.  Chest: AICD site benign  Lungs: Diminished BS Bilaterally, no wheezes, no rhonchi   Heart: Regular rate and rhythm. No Murmur. No rub. No gallop. No palpable thrill.  Abdomen: Bowel sounds present and normoactive. Soft. Nondistended. Nontender. No sense of mass. No organomegaly.  Back: No spinal tenderness. No costovertebral angle tenderness.   Extremities: No cyanosis or clubbing. No edema.   Skin: Warm. Dry. Good turgor. No rash. No vasculitic stigmata.  Psychiatric: Appropriate affect and mood for situation.     WBC Count: 11.60 K/uL (10-05 @ 08:20)  WBC Count: 13.08 K/uL (10-04 @ 07:47)  WBC Count: 13.94 K/uL (10-03 @ 07:37)  WBC Count: 11.37 K/uL (10-02 @ 08:29)  WBC Count: 11.71 K/uL (10-01 @ 07:20)  WBC Count: 11.01 K/uL (09-30 @ 06:35)  WBC Count: 10.85 K/uL (09-29 @ 07:00)                            11.9   11.60 )-----------( 352      ( 05 Oct 2023 08:20 )             37.1       10-05    134<L>  |  101  |  29<H>  ----------------------------<  246<H>  3.5   |  27  |  1.67<H>    Ca    7.9<L>      05 Oct 2023 08:20  Phos  3.1     10-05  Mg     2.1     10-05    TPro  6.1  /  Alb  2.1<L>  /  TBili  0.5  /  DBili  x   /  AST  24  /  ALT  83<H>  /  AlkPhos  56  10-04      Urinalysis Basic - ( 05 Oct 2023 08:20 )    Color: x / Appearance: x / SG: x / pH: x  Gluc: 246 mg/dL / Ketone: x  / Bili: x / Urobili: x   Blood: x / Protein: x / Nitrite: x   Leuk Esterase: x / RBC: x / WBC x   Sq Epi: x / Non Sq Epi: x / Bacteria: x        Creatinine Trend: 1.67<--, 1.81<--, 1.70<--, 1.73<--, 1.76<--, 1.84<--      MICROBIOLOGY:  v  .Stool Feces  10-01-23   No enteric pathogens isolated.  (Stool culture examined for Salmonella,  Shigella, Campylobacter, Aeromonas, Plesiomonas,  Vibrio, E.coli O157 and Yersinia)  --  --      Clean Catch Clean Catch (Midstream)  09-30-23   No growth  --  --      .Blood Blood  09-30-23   No growth at 4 days  --  --      .Blood Blood  09-30-23   No growth at 4 days  --  --      .Sputum Sputum  09-28-23   Rare Pseudomonas aeruginosa  Normal Respiratory Tamela present  --  Pseudomonas aeruginosa      .Blood Blood  09-27-23   No growth at 5 days  --  --      .Blood Blood  09-27-23   No growth at 5 days  --  --      .Blood Blood  09-27-23   No growth at 5 days  --  --      .Blood Blood  09-26-23   No growth at 5 days  --  --      Clean Catch Clean Catch (Midstream)  09-26-23   <10,000 CFU/mL Normal Urogenital Tamela  --  --        C-Reactive Protein, Serum: 29 (10-01)  C-Reactive Protein, Serum: 95 (09-26)      Procalcitonin, Serum: 0.34 (09-30-23 @ 14:05)  Procalcitonin, Serum: 0.54 (09-29-23 @ 03:55)      SARS-CoV-2: NotDetec (28 Sep 2023 12:40)  SARS-CoV-2: NotDetec (26 Sep 2023 00:39)    RADIOLOGY:

## 2023-10-05 NOTE — PROGRESS NOTE ADULT - SUBJECTIVE AND OBJECTIVE BOX
Patient is a 58y old  Male who presents with a chief complaint of CHF Exacerbation (04 Oct 2023 22:17)      INTERVAL HX:  Patient seen and examined @ bedside.  No respiratory distress, chest pain or SOB.    PAST MEDICAL & SURGICAL HISTORY:  CVA (cerebral vascular accident)      HTN (hypertension)      DM (diabetes mellitus)      HTN (hypertension)      CVA (cerebrovascular accident)      HTN (hypertension)      DM (diabetes mellitus)      HTN (hypertension)      S/P hernia repair  right inguinal hernia 1992      S/P cystoscopy  2012        	  MEDICATIONS:  MEDICATIONS  (STANDING):  aspirin enteric coated 81 milliGRAM(s) Oral daily  atorvastatin 80 milliGRAM(s) Oral at bedtime  buMETAnide Injectable 2 milliGRAM(s) IV Push two times a day  cefepime   IVPB      cefepime   IVPB 2000 milliGRAM(s) IV Intermittent every 12 hours  chlorhexidine 2% Cloths 1 Application(s) Topical <User Schedule>  clopidogrel Tablet 75 milliGRAM(s) Oral daily  enoxaparin Injectable 40 milliGRAM(s) SubCutaneous every 24 hours  influenza   Vaccine 0.5 milliLiter(s) IntraMuscular once  insulin glargine Injectable (LANTUS) 18 Unit(s) SubCutaneous at bedtime  insulin lispro (ADMELOG) corrective regimen sliding scale   SubCutaneous three times a day before meals  insulin lispro (ADMELOG) corrective regimen sliding scale   SubCutaneous at bedtime  insulin lispro Injectable (ADMELOG) 6 Unit(s) SubCutaneous three times a day before meals  metoprolol succinate ER 12.5 milliGRAM(s) Oral two times a day  pantoprazole  Injectable 40 milliGRAM(s) IV Push two times a day    MEDICATIONS  (PRN):  acetaminophen     Tablet .. 650 milliGRAM(s) Oral every 6 hours PRN Temp greater or equal to 38C (100.4F), Mild Pain (1 - 3)  albuterol    0.083% 2.5 milliGRAM(s) Nebulizer every 6 hours PRN Shortness of Breath and/or Wheezing  dextrose Oral Gel 15 Gram(s) Oral once PRN Blood Glucose LESS THAN 70 milliGRAM(s)/deciliter      Vitals:  T(F): 98 (10-05-23 @ 05:24), Max: 100.6 (10-04-23 @ 15:48)  HR: 95 (10-05-23 @ 05:24) (95 - 104)  BP: 103/66 (10-05-23 @ 05:24) (100/64 - 105/69)  RR: 18 (10-05-23 @ 05:24) (18 - 18)  SpO2: 96% (10-05-23 @ 05:24) (94% - 96%)  Wt(kg): --    10-04 @ 07:01  -  10-05 @ 07:00  --------------------------------------------------------  IN:    Oral Fluid: 360 mL  Total IN: 360 mL    OUT:    Indwelling Catheter - Urethral (mL): 2300 mL  Total OUT: 2300 mL    Total NET: -1940 mL              PHYSICAL EXAM:  General: Awake, responsive  CV: S1 S2 RRR, Left upper chest ICD, no edema, no ecchymosis   Lungs: CTABL  GI: Soft, BS +, ND, NT  Extremities: No edema    TELEMETRY: sinus/ v-pced 90s  	    LABS:	 	    CARDIAC MARKERS:          04 Oct 2023 07:47    132    |  99     |  29     ----------------------------<  309    3.6     |  26     |  1.81   03 Oct 2023 07:37    133    |  99     |  29     ----------------------------<  207    4.0     |  26     |  1.70     Ca    8.1        04 Oct 2023 07:47  Phos  3.2       04 Oct 2023 07:47  Mg     2.1       04 Oct 2023 07:47    TPro  6.1    /  Alb  2.1    /  TBili  0.5    /  DBili  x      /  AST  24     /  ALT  83     /  AlkPhos  56     04 Oct 2023 07:47                          11.9   11.60 )-----------( 352      ( 05 Oct 2023 08:20 )             37.1 ,                       12.3   13.08 )-----------( 336      ( 04 Oct 2023 07:47 )             37.9 ,                       12.9   13.94 )-----------( 309      ( 03 Oct 2023 07:37 )             40.7     TSH: Thyroid Stimulating Hormone, Serum: 0.771 uIU/mL (10-04 @ 07:47)

## 2023-10-06 LAB
ANION GAP SERPL CALC-SCNC: 8 MMOL/L — SIGNIFICANT CHANGE UP (ref 5–17)
BUN SERPL-MCNC: 28 MG/DL — HIGH (ref 7–23)
CALCIUM SERPL-MCNC: 8.1 MG/DL — LOW (ref 8.5–10.1)
CHLORIDE SERPL-SCNC: 103 MMOL/L — SIGNIFICANT CHANGE UP (ref 96–108)
CO2 SERPL-SCNC: 24 MMOL/L — SIGNIFICANT CHANGE UP (ref 22–31)
CREAT SERPL-MCNC: 1.62 MG/DL — HIGH (ref 0.5–1.3)
EGFR: 50 ML/MIN/1.73M2 — LOW
GLUCOSE BLDC GLUCOMTR-MCNC: 197 MG/DL — HIGH (ref 70–99)
GLUCOSE BLDC GLUCOMTR-MCNC: 227 MG/DL — HIGH (ref 70–99)
GLUCOSE BLDC GLUCOMTR-MCNC: 273 MG/DL — HIGH (ref 70–99)
GLUCOSE BLDC GLUCOMTR-MCNC: 430 MG/DL — HIGH (ref 70–99)
GLUCOSE SERPL-MCNC: 247 MG/DL — HIGH (ref 70–99)
HCT VFR BLD CALC: 36.3 % — LOW (ref 39–50)
HGB BLD-MCNC: 12 G/DL — LOW (ref 13–17)
MAGNESIUM SERPL-MCNC: 2.3 MG/DL — SIGNIFICANT CHANGE UP (ref 1.6–2.6)
MCHC RBC-ENTMCNC: 25.7 PG — LOW (ref 27–34)
MCHC RBC-ENTMCNC: 33.1 G/DL — SIGNIFICANT CHANGE UP (ref 32–36)
MCV RBC AUTO: 77.7 FL — LOW (ref 80–100)
NRBC # BLD: 0 /100 WBCS — SIGNIFICANT CHANGE UP (ref 0–0)
PHOSPHATE SERPL-MCNC: 2.6 MG/DL — SIGNIFICANT CHANGE UP (ref 2.5–4.5)
PLATELET # BLD AUTO: 373 K/UL — SIGNIFICANT CHANGE UP (ref 150–400)
POTASSIUM SERPL-MCNC: 3.6 MMOL/L — SIGNIFICANT CHANGE UP (ref 3.5–5.3)
POTASSIUM SERPL-SCNC: 3.6 MMOL/L — SIGNIFICANT CHANGE UP (ref 3.5–5.3)
RBC # BLD: 4.67 M/UL — SIGNIFICANT CHANGE UP (ref 4.2–5.8)
RBC # FLD: 13.7 % — SIGNIFICANT CHANGE UP (ref 10.3–14.5)
SODIUM SERPL-SCNC: 135 MMOL/L — SIGNIFICANT CHANGE UP (ref 135–145)
WBC # BLD: 12.2 K/UL — HIGH (ref 3.8–10.5)
WBC # FLD AUTO: 12.2 K/UL — HIGH (ref 3.8–10.5)

## 2023-10-06 PROCEDURE — 99232 SBSQ HOSP IP/OBS MODERATE 35: CPT

## 2023-10-06 PROCEDURE — 99233 SBSQ HOSP IP/OBS HIGH 50: CPT

## 2023-10-06 RX ORDER — TAMSULOSIN HYDROCHLORIDE 0.4 MG/1
0.4 CAPSULE ORAL AT BEDTIME
Refills: 0 | Status: DISCONTINUED | OUTPATIENT
Start: 2023-10-06 | End: 2023-10-13

## 2023-10-06 RX ORDER — POTASSIUM CHLORIDE 20 MEQ
20 PACKET (EA) ORAL
Refills: 0 | Status: COMPLETED | OUTPATIENT
Start: 2023-10-06 | End: 2023-10-06

## 2023-10-06 RX ORDER — INSULIN GLARGINE 100 [IU]/ML
24 INJECTION, SOLUTION SUBCUTANEOUS AT BEDTIME
Refills: 0 | Status: DISCONTINUED | OUTPATIENT
Start: 2023-10-06 | End: 2023-10-08

## 2023-10-06 RX ORDER — INSULIN LISPRO 100/ML
8 VIAL (ML) SUBCUTANEOUS
Refills: 0 | Status: DISCONTINUED | OUTPATIENT
Start: 2023-10-06 | End: 2023-10-07

## 2023-10-06 RX ADMIN — Medication 4: at 08:12

## 2023-10-06 RX ADMIN — Medication 12.5 MILLIGRAM(S): at 05:23

## 2023-10-06 RX ADMIN — Medication 81 MILLIGRAM(S): at 12:21

## 2023-10-06 RX ADMIN — Medication 6 UNIT(S): at 08:12

## 2023-10-06 RX ADMIN — Medication 20 MILLIEQUIVALENT(S): at 12:33

## 2023-10-06 RX ADMIN — PANTOPRAZOLE SODIUM 40 MILLIGRAM(S): 20 TABLET, DELAYED RELEASE ORAL at 05:30

## 2023-10-06 RX ADMIN — CHLORHEXIDINE GLUCONATE 1 APPLICATION(S): 213 SOLUTION TOPICAL at 05:18

## 2023-10-06 RX ADMIN — CLOPIDOGREL BISULFATE 75 MILLIGRAM(S): 75 TABLET, FILM COATED ORAL at 12:22

## 2023-10-06 RX ADMIN — PANTOPRAZOLE SODIUM 40 MILLIGRAM(S): 20 TABLET, DELAYED RELEASE ORAL at 17:05

## 2023-10-06 RX ADMIN — INSULIN GLARGINE 24 UNIT(S): 100 INJECTION, SOLUTION SUBCUTANEOUS at 22:04

## 2023-10-06 RX ADMIN — Medication 8 UNIT(S): at 12:21

## 2023-10-06 RX ADMIN — Medication 20 MILLIEQUIVALENT(S): at 14:30

## 2023-10-06 RX ADMIN — BUMETANIDE 2 MILLIGRAM(S): 0.25 INJECTION INTRAMUSCULAR; INTRAVENOUS at 14:59

## 2023-10-06 RX ADMIN — CEFEPIME 100 MILLIGRAM(S): 1 INJECTION, POWDER, FOR SOLUTION INTRAMUSCULAR; INTRAVENOUS at 05:15

## 2023-10-06 RX ADMIN — ENOXAPARIN SODIUM 40 MILLIGRAM(S): 100 INJECTION SUBCUTANEOUS at 05:19

## 2023-10-06 RX ADMIN — Medication 6: at 17:01

## 2023-10-06 RX ADMIN — Medication 4: at 22:03

## 2023-10-06 RX ADMIN — CEFEPIME 100 MILLIGRAM(S): 1 INJECTION, POWDER, FOR SOLUTION INTRAMUSCULAR; INTRAVENOUS at 17:06

## 2023-10-06 RX ADMIN — Medication 8 UNIT(S): at 17:02

## 2023-10-06 RX ADMIN — Medication 2: at 12:21

## 2023-10-06 RX ADMIN — BUMETANIDE 2 MILLIGRAM(S): 0.25 INJECTION INTRAMUSCULAR; INTRAVENOUS at 06:07

## 2023-10-06 RX ADMIN — Medication 20 MILLIEQUIVALENT(S): at 17:03

## 2023-10-06 RX ADMIN — TAMSULOSIN HYDROCHLORIDE 0.4 MILLIGRAM(S): 0.4 CAPSULE ORAL at 12:33

## 2023-10-06 RX ADMIN — ATORVASTATIN CALCIUM 80 MILLIGRAM(S): 80 TABLET, FILM COATED ORAL at 21:43

## 2023-10-06 NOTE — PROGRESS NOTE ADULT - SUBJECTIVE AND OBJECTIVE BOX
BRANDEN MARRUFO  MRN-[id number]    Follow Up:  PNA    Interval History: the pt was seen and examined earlier, feeling better, on 2L NC now and comfortable, pt's wife is present. Pt is afebrile since 10/4, WBC slightly elevated 12.2.     PAST MEDICAL & SURGICAL HISTORY:  CVA (cerebral vascular accident)      HTN (hypertension)      DM (diabetes mellitus)      HTN (hypertension)      CVA (cerebrovascular accident)      HTN (hypertension)      DM (diabetes mellitus)      HTN (hypertension)      S/P hernia repair  right inguinal hernia 1992      S/P cystoscopy  2012          ROS:    [ ] Unobtainable because:  [x ] All other systems negative    Constitutional: no fever, no chills  Head: no trauma  Eyes: no vision changes, no eye pain  ENT:  no sore throat, no rhinorrhea  Cardiovascular:  no chest pain, no palpitation  Respiratory:  no SOB, no cough  GI:  no abd pain, no vomiting, no diarrhea  urinary: no dysuria, no hematuria, no flank pain  musculoskeletal:  no joint pain, no joint swelling  skin:  no rash  neurology:  no headache, no seizure, no change in mental status  psych: no anxiety, no depression         Allergies  No Known Allergies        ANTIMICROBIALS:  cefepime   IVPB    cefepime   IVPB 2000 every 12 hours      OTHER MEDS:  acetaminophen     Tablet .. 650 milliGRAM(s) Oral every 6 hours PRN  albuterol    0.083% 2.5 milliGRAM(s) Nebulizer every 6 hours PRN  aspirin enteric coated 81 milliGRAM(s) Oral daily  atorvastatin 80 milliGRAM(s) Oral at bedtime  buMETAnide Injectable 2 milliGRAM(s) IV Push two times a day  chlorhexidine 2% Cloths 1 Application(s) Topical <User Schedule>  clopidogrel Tablet 75 milliGRAM(s) Oral daily  dextrose Oral Gel 15 Gram(s) Oral once PRN  enoxaparin Injectable 40 milliGRAM(s) SubCutaneous every 24 hours  influenza   Vaccine 0.5 milliLiter(s) IntraMuscular once  insulin glargine Injectable (LANTUS) 24 Unit(s) SubCutaneous at bedtime  insulin lispro (ADMELOG) corrective regimen sliding scale   SubCutaneous three times a day before meals  insulin lispro (ADMELOG) corrective regimen sliding scale   SubCutaneous at bedtime  insulin lispro Injectable (ADMELOG) 8 Unit(s) SubCutaneous three times a day before meals  metoprolol succinate ER 12.5 milliGRAM(s) Oral two times a day  pantoprazole  Injectable 40 milliGRAM(s) IV Push two times a day  tamsulosin 0.4 milliGRAM(s) Oral at bedtime      Vital Signs Last 24 Hrs  T(C): 37 (06 Oct 2023 16:46), Max: 37.7 (05 Oct 2023 23:36)  T(F): 98.6 (06 Oct 2023 16:46), Max: 99.9 (05 Oct 2023 23:36)  HR: 109 (06 Oct 2023 16:46) (99 - 109)  BP: 100/67 (06 Oct 2023 16:46) (99/67 - 112/73)  BP(mean): --  RR: 18 (06 Oct 2023 16:46) (16 - 18)  SpO2: 98% (06 Oct 2023 16:46) (95% - 98%)    Parameters below as of 06 Oct 2023 14:40  Patient On (Oxygen Delivery Method): nasal cannula        Physical Exam:  General: Nontoxic-appearing Male in no acute distress. NC  HEENT: AT/NC. Anicteric. Conjunctiva pink and moist. Oropharynx clear.  Neck: Not rigid. No sense of mass.  Nodes: None palpable.  Chest: AICD site benign  Lungs: more clear BS Bilaterally on today's exam, no wheezes, no rhonchi   Heart: Regular rate and rhythm. No Murmur. No rub. No gallop. No palpable thrill.  Abdomen: Bowel sounds present and normoactive. Soft. Nondistended. Nontender. No sense of mass. No organomegaly.  Back: No spinal tenderness. No costovertebral angle tenderness.   Extremities: No cyanosis or clubbing. No edema.   Skin: Warm. Dry. Good turgor. No rash. No vasculitic stigmata.  Psychiatric: Appropriate affect and mood for situation.     WBC Count: 12.20 K/uL (10-06 @ 07:40)  WBC Count: 11.60 K/uL (10-05 @ 08:20)  WBC Count: 13.08 K/uL (10-04 @ 07:47)  WBC Count: 13.94 K/uL (10-03 @ 07:37)  WBC Count: 11.37 K/uL (10-02 @ 08:29)  WBC Count: 11.71 K/uL (10-01 @ 07:20)  WBC Count: 11.01 K/uL (09-30 @ 06:35)                            12.0   12.20 )-----------( 373      ( 06 Oct 2023 07:40 )             36.3       10-06    135  |  103  |  28<H>  ----------------------------<  247<H>  3.6   |  24  |  1.62<H>    Ca    8.1<L>      06 Oct 2023 07:40  Phos  2.6     10-06  Mg     2.3     10-06        Urinalysis Basic - ( 06 Oct 2023 07:40 )    Color: x / Appearance: x / SG: x / pH: x  Gluc: 247 mg/dL / Ketone: x  / Bili: x / Urobili: x   Blood: x / Protein: x / Nitrite: x   Leuk Esterase: x / RBC: x / WBC x   Sq Epi: x / Non Sq Epi: x / Bacteria: x        Creatinine Trend: 1.62<--, 1.67<--, 1.81<--, 1.70<--, 1.73<--, 1.76<--      MICROBIOLOGY:  v  .Stool Feces  10-01-23   No enteric pathogens isolated.  (Stool culture examined for Salmonella,  Shigella, Campylobacter, Aeromonas, Plesiomonas,  Vibrio, E.coli O157 and Yersinia)  --  --      Clean Catch Clean Catch (Midstream)  09-30-23   No growth  --  --      .Blood Blood  09-30-23   No growth at 5 days  --  --      .Blood Blood  09-30-23   No growth at 5 days  --  --      .Sputum Sputum  09-28-23   Rare Pseudomonas aeruginosa  Normal Respiratory Tamela present  --  Pseudomonas aeruginosa      .Blood Blood  09-27-23   No growth at 5 days  --  --      .Blood Blood  09-27-23   No growth at 5 days  --  --      .Blood Blood  09-27-23   No growth at 5 days  --  --      .Blood Blood  09-26-23   No growth at 5 days  --  --      Clean Catch Clean Catch (Midstream)  09-26-23   <10,000 CFU/mL Normal Urogenital Tamela  --  --      C-Reactive Protein, Serum: 29 (10-01)  C-Reactive Protein, Serum: 95 (09-26)      Procalcitonin, Serum: 0.34 (09-30-23 @ 14:05)      SARS-CoV-2: NotDetec (28 Sep 2023 12:40)  SARS-CoV-2: NotDetec (26 Sep 2023 00:39)    RADIOLOGY:

## 2023-10-06 NOTE — PROGRESS NOTE ADULT - SUBJECTIVE AND OBJECTIVE BOX
[facility] Division of Hospital Medicine  Catherine Kelley MD  Available via MS Teams  Pager: [phone]    SUBJECTIVE / OVERNIGHT EVENTS:  no events overnight, has significant weakness. denies any nausea, vomiting, headaches, shortness of breath, cough, abdominal pain, diarrhea, constipation.     MEDICATIONS  (STANDING):  aspirin enteric coated 81 milliGRAM(s) Oral daily  atorvastatin 80 milliGRAM(s) Oral at bedtime  buMETAnide Injectable 2 milliGRAM(s) IV Push two times a day  cefepime   IVPB 2000 milliGRAM(s) IV Intermittent every 12 hours  cefepime   IVPB      chlorhexidine 2% Cloths 1 Application(s) Topical <User Schedule>  clopidogrel Tablet 75 milliGRAM(s) Oral daily  enoxaparin Injectable 40 milliGRAM(s) SubCutaneous every 24 hours  influenza   Vaccine 0.5 milliLiter(s) IntraMuscular once  insulin glargine Injectable (LANTUS) 24 Unit(s) SubCutaneous at bedtime  insulin lispro (ADMELOG) corrective regimen sliding scale   SubCutaneous at bedtime  insulin lispro (ADMELOG) corrective regimen sliding scale   SubCutaneous three times a day before meals  insulin lispro Injectable (ADMELOG) 8 Unit(s) SubCutaneous three times a day before meals  metoprolol succinate ER 12.5 milliGRAM(s) Oral two times a day  pantoprazole  Injectable 40 milliGRAM(s) IV Push two times a day  potassium chloride    Tablet ER 20 milliEquivalent(s) Oral every 2 hours  tamsulosin 0.4 milliGRAM(s) Oral at bedtime    MEDICATIONS  (PRN):  acetaminophen     Tablet .. 650 milliGRAM(s) Oral every 6 hours PRN Temp greater or equal to 38C (100.4F), Mild Pain (1 - 3)  albuterol    0.083% 2.5 milliGRAM(s) Nebulizer every 6 hours PRN Shortness of Breath and/or Wheezing  dextrose Oral Gel 15 Gram(s) Oral once PRN Blood Glucose LESS THAN 70 milliGRAM(s)/deciliter      I&O's Summary    05 Oct 2023 07:01  -  06 Oct 2023 07:00  --------------------------------------------------------  IN: 880 mL / OUT: 2200 mL / NET: -1320 mL        PHYSICAL EXAM:  Vital Signs Last 24 Hrs  T(C): 36.2 (06 Oct 2023 10:39), Max: 37.7 (05 Oct 2023 16:41)  T(F): 97.2 (06 Oct 2023 10:39), Max: 99.9 (05 Oct 2023 23:36)  HR: 102 (06 Oct 2023 10:39) (89 - 105)  BP: 99/67 (06 Oct 2023 10:39) (99/67 - 112/73)  BP(mean): --  RR: 16 (06 Oct 2023 10:39) (16 - 19)  SpO2: 95% (06 Oct 2023 10:39) (95% - 98%)    Parameters below as of 06 Oct 2023 10:39  Patient On (Oxygen Delivery Method): nasal cannula      PHYSICAL EXAM:  GENERAL: NAD, well-groomed, well-developed  HEAD:  Atraumatic, Normocephalic  EYES: EOMI, PERRLA   NECK: Supple   NERVOUS SYSTEM:  Alert & Oriented X3, Good concentration   CHEST/LUNG: Clear to auscultation bilaterally; No rales, rhonchi, wheezing, or rubs  HEART: Regular rate and rhythm; No murmurs, rubs, or gallops  ABDOMEN: Soft, Nontender, Nondistended; Bowel sounds present  EXTREMITIES: No clubbing, cyanosis, or edema    LABS:                        12.0   12.20 )-----------( 373      ( 06 Oct 2023 07:40 )             36.3     10-06    135  |  103  |  28<H>  ----------------------------<  247<H>  3.6   |  24  |  1.62<H>    Ca    8.1<L>      06 Oct 2023 07:40  Phos  2.6     10-06  Mg     2.3     10-06            Urinalysis Basic - ( 06 Oct 2023 07:40 )    Color: x / Appearance: x / SG: x / pH: x  Gluc: 247 mg/dL / Ketone: x  / Bili: x / Urobili: x   Blood: x / Protein: x / Nitrite: x   Leuk Esterase: x / RBC: x / WBC x   Sq Epi: x / Non Sq Epi: x / Bacteria: x        SARS-CoV-2: NotDetec (28 Sep 2023 12:40)  SARS-CoV-2: NotDetec (26 Sep 2023 00:39)      RADIOLOGY & ADDITIONAL TESTS:  New Results Reviewed Today: cbc bmp mg phos  New Imaging Personally Reviewed Today:  New Electrocardiogram Personally Reviewed Today:  Prior or Outpatient Records Reviewed Today:    COMMUNICATION:  Care Discussed with Consultants/Other Providers and Details of Discussion:  Discussions with Patient/Family:  PCP Communication:

## 2023-10-06 NOTE — PROGRESS NOTE ADULT - ASSESSMENT
58M w PMHx  CAD, CVA, DM (suboptimal compliance, checks FS Q4days though a1c <7) PPM/AICD for bradycardia/Mobitz II, LAD- thyroid tissue in retrospect,  now presenting with pneumonia, CHF, NSTEMI, renal insufficiency. Patient treated for CAP, still febrile though at lease at present hyperthermia blanket could have been playing a role.   Seems out of proportion to attribute to rhinovirus/enterovirus  More extensive pneumonia has a more delayed response to therapy  No RF for resistant fredi elicited  Legionella negative  AICD site benign, cx NTD  Exam otherwise unrevealing for acute infectious process.   COVID-negative x2  Blood cx NTD  TSH not suppressed    10/2: pt is feeling better, however, continues having fevers, had a fever of 101.6  at midnight and 101.2 today midday, on NC, WBC without significant change 11.37, Cr is about the same 1.73, BCs and UCs with no growth to date, abx were changed to cefepime and vanco over the weekend, Cefepime IV continued, Vancomycin stopped today.   Pt will need re-imaging to evaluate for the source of pt's fevers.   Attending Addendum--  Case reviewed with CIARRA Wall. Her note reviewed and modified as appropriate.   Still febrile, though trend has been better  CT's as above    10/3: the pt is somewhat forgetful, low grade temps overnight and this morning, on 3L NC and comfortable, WBC elevated, 13.94, Cr about the same as yesterday, yesterday's CT c/a/p - Hazy groundglass opacities in the upper lobes. Mild to moderate bilateral pleural effusions with overlying compressive atelectasis. Differential considerations include edema and/or infection. Unremarkable, unenhanced CT of the abdomen and pelvis.  Cefepime IV continued   Attending Addendum--  Case reviewed with CIARRA Wall. Her note reviewed and modified as appropriate.   Patient personally assessed and examined.  CT's personally reviewed  Perhaps some moderation in fever curve  Continue cefepime- too early to expect complete response to rx vs bronchiectasis/pneumonia    10/4: had elevated temperatures yesterday evening, none overnight, remains on NC, comfortable, WBC is about the same as yesterday, Cr elevated, will continue with Cefepime IV and will monitor for response.   Attending Addendum--  Case reviewed with NP Mamta Wall. Her note reviewed and modified as appropriate.   Still with fever but magnitude and frequency have improved.  Continue cefepime for now  follow temp curve    10/5: no fevers since midday yesterday, NC, WBc better 11.6, Cr better 1.67, BCs remain with no growth to date, Cefepime IV continued.   10/6: improving, afebrile, NC, WBC elevated 12.2, Cr is about the same 1.62, all BCs with no growth to date, today is day #7 of Cefepime IV, duration TBD.     Suggestions--  Continue cefepime IV  MRSA nares PCR - negative   CT c/a/p - reviewed   follow culture data  trend temperatures and WBC  monitor pt's renal function   HIV test - negative  wean off supplemental O2 as able    will discuss with attending   discussed with the pt and his wife at the bedside

## 2023-10-06 NOTE — CHART NOTE - NSCHARTNOTEFT_GEN_A_CORE
Assessment:  Pt adm c diagnosis of chest pain, episgastric pain, pneumonia, hypoxia, elevated troponin, c acute respiratory failure due to acute CHF, acute NSTEMI, LAURA, urinary retention, enterovirus infection, fevers, possible hyperthyroidism, goiter noted, Endocrine consult noted.  PMH include CVA, DM(was on metformin, glipizide), HTN, vertigo, HLD, Mobitz II,     Factors impacting intake: [x ] none [ ] nausea  [ ] vomiting [ ] diarrhea [ ] constipation  [ ]chewing problems [ ] swallowing issues  [ ] other:     Diet Prescription: Diet, DASH/TLC:   Sodium & Cholesterol Restricted  Consistent Carbohydrate {Evening Snack}  Halal (09-27-23 @ 13:02)    Intake: >75% of meals    Current Weight: 10/06, 68.5 kg, 09/27, 69.6 kg, 09/26, 72.2 kg, c wt. loss of 3.7 kg  % Weight Change: 5.1%  09/30, no edema noted      Pertinent Medications: MEDICATIONS  (STANDING):  aspirin enteric coated 81 milliGRAM(s) Oral daily  atorvastatin 80 milliGRAM(s) Oral at bedtime  buMETAnide Injectable 2 milliGRAM(s) IV Push two times a day  cefepime   IVPB      cefepime   IVPB 2000 milliGRAM(s) IV Intermittent every 12 hours  chlorhexidine 2% Cloths 1 Application(s) Topical <User Schedule>  clopidogrel Tablet 75 milliGRAM(s) Oral daily  enoxaparin Injectable 40 milliGRAM(s) SubCutaneous every 24 hours  influenza   Vaccine 0.5 milliLiter(s) IntraMuscular once  insulin glargine Injectable (LANTUS) 24 Unit(s) SubCutaneous at bedtime  insulin lispro (ADMELOG) corrective regimen sliding scale   SubCutaneous three times a day before meals  insulin lispro (ADMELOG) corrective regimen sliding scale   SubCutaneous at bedtime  insulin lispro Injectable (ADMELOG) 8 Unit(s) SubCutaneous three times a day before meals  metoprolol succinate ER 12.5 milliGRAM(s) Oral two times a day  pantoprazole  Injectable 40 milliGRAM(s) IV Push two times a day  potassium chloride    Tablet ER 20 milliEquivalent(s) Oral every 2 hours  tamsulosin 0.4 milliGRAM(s) Oral at bedtime    MEDICATIONS  (PRN):  acetaminophen     Tablet .. 650 milliGRAM(s) Oral every 6 hours PRN Temp greater or equal to 38C (100.4F), Mild Pain (1 - 3)  albuterol    0.083% 2.5 milliGRAM(s) Nebulizer every 6 hours PRN Shortness of Breath and/or Wheezing  dextrose Oral Gel 15 Gram(s) Oral once PRN Blood Glucose LESS THAN 70 milliGRAM(s)/deciliter    Pertinent Labs: 10-06 Na135 mmol/L Glu 247 mg/dL<H> K+ 3.6 mmol/L Cr  1.62 mg/dL<H> BUN 28 mg/dL<H> 10-06 Phos 2.6 mg/dL 10-04 Alb 2.1 g/dL<L> 09-27 Chol 198 mg/dL LDL --    HDL 66 mg/dL Trig 90 mg/dL10-04 ALT 83 U/L<H> AST 24 U/L Alkaline Phosphatase 56 U/L  10/05, T3 ng/dL 64 ng/dL <L> Thyroid stimulating Hormone 0.799 uU/ml   09-27-23 @ 02:40 a1c 6.9<H-good glycemic control>   CAPILLARY BLOOD GLUCOSE      POCT Blood Glucose.: 197 mg/dL (06 Oct 2023 11:36)  POCT Blood Glucose.: 227 mg/dL (06 Oct 2023 07:37)  POCT Blood Glucose.: 332 mg/dL (05 Oct 2023 21:54)  POCT Blood Glucose.: 351 mg/dL (05 Oct 2023 17:04)    Skin:   10/06, WDL except ecchymotic     Estimated Needs:   [x ] no change since previous assessment  [ ] recalculated:     Previous Nutrition Diagnosis: (09/29)  Malnutrition: moderate malnutrition in context of chronic illness  Etiology: increased protein-energy needs in setting of hx of CVA, cardiac dysfunction  Signs/Symptoms: physical findings of mild fat/muscle loss  Goal/Expected Outcome: pt to consume >75% of meals; met   Nutrition Diagnosis is [x ] ongoing  [ ] resolved [ ] not applicable       New Nutrition Diagnosis: [x ] not applicable     Interventions:   Recommend  [x] Continue c current diet regimen   [ ] Change Diet To:  [ ] Nutrition Supplement  [ ] Nutrition Support  [ ] Other:     Monitoring and Evaluation:   [ x] PO intake [ x ] Tolerance to diet prescription [ x ] weights [ x ] labs; Glucose, renal indices [ x ] follow up per protocol  [ ] other:

## 2023-10-06 NOTE — PROGRESS NOTE ADULT - ASSESSMENT
59 yo m pmhx CVA with mild left sided deficits, HTN, DM2, vertigo, HLD, Mobitz II s/pp Medtronic dual chamber ICD (12/21/22) biba from home with complaints of dyspnea and chest pain and was admitted w/ acute hypoxic respiratory failure likely due to acute pulmonary edema due to acute HFrEF in setting of acute NSTEMI, LAURA and enterovirus infection.     Acute hypoxic respiratory failure due to acute sys/ diastolic CHF  - CT on admission showed patchy and confluent ground-glass and consolidative opacities primarily in the bilateral upper and lower lobes c/w a combination of infection and edema and small bilateral pleural effusions  - patient has improved w/ Lasix and Bipap, now on NC  - Echo showed EF 20% w/ severely decreased global left ventricular systolic function w/ multiple left ventricular regional wall motion abnormalities & grade II diastolic dysfunction.   - c/w Bumex & add metoprolol     Elevated Trop  - likely due to MI  - trop peaked at 24,293  - as per cardio, will likely need transfer for cardiac catheterization, once infectious symptoms resolve  - c/w ASA, Plavix & Lipitor  - heparin gtt stopped in ICU     Fevers  - likely due to PNA as CT showed hazy groundglass opacities in the upper lobes w/ mild to moderate bilateral pleural effusions with overlying compressive atelectasis  - s/p Ceftriaxone in ICU    - as per ID, stopped vanc stopped  - c/w Cefepime  - Sputum + on 9/29 gram+ cocci in pairs, gram -rods and gram - coccobacilli     - BCx NGTD    Urinary retention   - Douglass placed 9/30 after multiple straight caths  - urology following   - ongoing TOV starting 10/6    LAURA  - ATN vs cardiorenal  - Cr now improving with diuresis     HTN  - stop IV hydralazine & add metoprolol     DM  - c/w Lantus, mealtime lispro and ISS, increasing requirements   - hgb A1C is 6.9  - endo following    Possible Hyperthyroidism  - CT showed goiter  - TSH is 0.258 but free T4 is 2, repeat tsh wnl    Prophylaxis:  DVT: Lovenox  GI: Protonix     Dispo: PT and OT consulted 10/6

## 2023-10-06 NOTE — PROGRESS NOTE ADULT - SUBJECTIVE AND OBJECTIVE BOX
NP Progress Note     Follow Up: Chest Pain/Elevated Troponin    HPI:  57 yo m pmhx CVA with mild left sided deficits, HTN, DM2, vertigo, HLD, Mobitz II s/pp Medtronic dual chamber ICD (12/21/22) biba from home with complaints of sob/chest pain.  Per patient's wife cp began yesterday however persistent today and with associated worsening sob.  In ED patient tachypneic, tachycardic.  Labs significant for Trop 6791.6, Cr 1.42, bnp and lactate 2.9.  Patient given 2L IVF, lactate worsening, rapidly worsening sob and hypoxia.  Patient seen at bedside, on NRB with spo2 88-92% tachypenic, appears uncomfortable.  Coarse b/l.  Bedside POCUS exam performed, diffuse b lines, ivf plump, difficultly visualizing chambers however overall function appears decreased based off his previous tte.  Patient ordered for Bipap and lasix 80mg.  Stat TTE ordered.  Admit to ICU.      Subjective/Observations: Pt. seen and examined and evaluated. Pt. resting comfortably in bed in NAD, with no respiratory distress, no chest pain, dyspnea, palpitations, PND, or orthopnea.    REVIEW OF SYSTEMS: All other review of systems is negative unless indicated above    PAST MEDICAL & SURGICAL HISTORY:  CVA (cerebral vascular accident)  HTN (hypertension)  DM (diabetes mellitus)    MEDICATIONS  (STANDING):  aspirin enteric coated 81 milliGRAM(s) Oral daily  atorvastatin 80 milliGRAM(s) Oral at bedtime  buMETAnide Injectable 2 milliGRAM(s) IV Push two times a day  cefepime   IVPB      cefepime   IVPB 2000 milliGRAM(s) IV Intermittent every 12 hours  chlorhexidine 2% Cloths 1 Application(s) Topical <User Schedule>  clopidogrel Tablet 75 milliGRAM(s) Oral daily  enoxaparin Injectable 40 milliGRAM(s) SubCutaneous every 24 hours  influenza   Vaccine 0.5 milliLiter(s) IntraMuscular once  insulin glargine Injectable (LANTUS) 24 Unit(s) SubCutaneous at bedtime  insulin lispro (ADMELOG) corrective regimen sliding scale   SubCutaneous three times a day before meals  insulin lispro (ADMELOG) corrective regimen sliding scale   SubCutaneous at bedtime  insulin lispro Injectable (ADMELOG) 8 Unit(s) SubCutaneous three times a day before meals  metoprolol succinate ER 12.5 milliGRAM(s) Oral two times a day  pantoprazole  Injectable 40 milliGRAM(s) IV Push two times a day  potassium chloride    Tablet ER 20 milliEquivalent(s) Oral every 2 hours  tamsulosin 0.4 milliGRAM(s) Oral at bedtime    MEDICATIONS  (PRN):  acetaminophen     Tablet .. 650 milliGRAM(s) Oral every 6 hours PRN Temp greater or equal to 38C (100.4F), Mild Pain (1 - 3)  albuterol    0.083% 2.5 milliGRAM(s) Nebulizer every 6 hours PRN Shortness of Breath and/or Wheezing  dextrose Oral Gel 15 Gram(s) Oral once PRN Blood Glucose LESS THAN 70 milliGRAM(s)/deciliter    Allergies: No Known Allergies      Vital Signs Last 24 Hrs  T(C): 36.2 (06 Oct 2023 10:39), Max: 37.7 (05 Oct 2023 16:41)  T(F): 97.2 (06 Oct 2023 10:39), Max: 99.9 (05 Oct 2023 23:36)  HR: 102 (06 Oct 2023 10:39) (89 - 105)  BP: 99/67 (06 Oct 2023 10:39) (99/67 - 112/73)  BP(mean): --  RR: 16 (06 Oct 2023 10:39) (16 - 19)  SpO2: 95% (06 Oct 2023 10:39) (95% - 98%)    Parameters below as of 06 Oct 2023 10:39  Patient On (Oxygen Delivery Method): nasal cannula    LABS: All Labs Reviewed:                        12.0   12.20 )-----------( 373      ( 06 Oct 2023 07:40 )             36.3              06 Oct 2023 07:40    135    |  103    |  28     ----------------------------<  247    3.6     |  24     |  1.62     Ca    8.1        06 Oct 2023 07:40  Mg     2.3       06 Oct 2023 07:40    Echo:  < from: TTE Echo Complete w/o Contrast w/ Doppler (09.26.23 @ 13:20) >  Summary:   1. Technically limited study.   2. Left ventricular ejection fraction, by visual estimation, is 20%.   3. Severely decreased global left ventricular systolic function.   4. Multiple left ventricular regional wall motion abnormalities exist.   See wall motion findings.   5. Elevated left ventricular end-diastolic pressure.   6. Spectral Doppler shows pseudonormal pattern of left ventricular   myocardial filling (Grade II diastolic dysfunction).   7. Global longitudinal strain using NOTIK software at a HR of 121bpm and BP   137/99 is -5.1%, which is severely reduced. There is some sparing of the   basal segments.   8. Moderately reduced RVsystolic function.   9. The left atrium is normal in size.  10. Trivial pericardial effusion.  11. Mild tricuspid regurgitation.    Interpretation of Telemetry: Overnight on telemetry ST  V-paced       Physical Exam:  Appearance: [ ] Normal  [ ] abnormal [X ] NAD   Eyes: [ ] PERRL [ ] EOMI  HEENT: [ ] Normal [ ] Abnormal oral mucosa [ ]NC/AT  Cardiovascular: [X ] S1 [X ] S2 [ ] RRR [ ] m/r/g [ ]edema [ ] JVP  Procedural Access Site: N/A  Respiratory: [X ] Clear to auscultation bilaterally  Gastrointestinal: [ ] Soft [ ] tenderness[ ] distension [ ] BS  Musculoskeletal: [ ] clubbing [ ] joint deformity   Neurologic: [ ] Non-focal  Lymphatic: [ ] lymphadenopathy  Psychiatry: [X ] AAOx3  [ ] confused [ ] disoriented [ ] Mood & affect appropriate  Skin: [ ]  rashes [ ] ecchymoses [ ] cyanosis   NP Progress Note     Follow Up: Chest Pain/Elevated Troponin/CHF exacerbation     HPI:  59 yo m pmhx CVA with mild left sided deficits, HTN, DM2, vertigo, HLD, Mobitz II s/pp Medtronic dual chamber ICD (12/21/22) biba from home with complaints of sob/chest pain.  Per patient's wife cp began yesterday however persistent today and with associated worsening sob.  In ED patient tachypneic, tachycardic.  Labs significant for Trop 6791.6, Cr 1.42, bnp and lactate 2.9.  Patient given 2L IVF, lactate worsening, rapidly worsening sob and hypoxia.  Patient seen at bedside, on NRB with spo2 88-92% tachypenic, appears uncomfortable.  Coarse b/l.  Bedside POCUS exam performed, diffuse b lines, ivf plump, difficultly visualizing chambers however overall function appears decreased based off his previous tte.  Patient ordered for Bipap and lasix 80mg.  Stat TTE ordered.  Admit to ICU.      Subjective/Observations: Pt. seen and examined and evaluated. Pt. resting comfortably in bed in NAD, with no respiratory distress, no chest pain, dyspnea, palpitations, PND, or orthopnea.    REVIEW OF SYSTEMS: All other review of systems is negative unless indicated above    PAST MEDICAL & SURGICAL HISTORY:  CVA (cerebral vascular accident)  HTN (hypertension)  DM (diabetes mellitus)    MEDICATIONS  (STANDING):  aspirin enteric coated 81 milliGRAM(s) Oral daily  atorvastatin 80 milliGRAM(s) Oral at bedtime  buMETAnide Injectable 2 milliGRAM(s) IV Push two times a day  cefepime   IVPB      cefepime   IVPB 2000 milliGRAM(s) IV Intermittent every 12 hours  chlorhexidine 2% Cloths 1 Application(s) Topical <User Schedule>  clopidogrel Tablet 75 milliGRAM(s) Oral daily  enoxaparin Injectable 40 milliGRAM(s) SubCutaneous every 24 hours  influenza   Vaccine 0.5 milliLiter(s) IntraMuscular once  insulin glargine Injectable (LANTUS) 24 Unit(s) SubCutaneous at bedtime  insulin lispro (ADMELOG) corrective regimen sliding scale   SubCutaneous three times a day before meals  insulin lispro (ADMELOG) corrective regimen sliding scale   SubCutaneous at bedtime  insulin lispro Injectable (ADMELOG) 8 Unit(s) SubCutaneous three times a day before meals  metoprolol succinate ER 12.5 milliGRAM(s) Oral two times a day  pantoprazole  Injectable 40 milliGRAM(s) IV Push two times a day  potassium chloride    Tablet ER 20 milliEquivalent(s) Oral every 2 hours  tamsulosin 0.4 milliGRAM(s) Oral at bedtime    MEDICATIONS  (PRN):  acetaminophen     Tablet .. 650 milliGRAM(s) Oral every 6 hours PRN Temp greater or equal to 38C (100.4F), Mild Pain (1 - 3)  albuterol    0.083% 2.5 milliGRAM(s) Nebulizer every 6 hours PRN Shortness of Breath and/or Wheezing  dextrose Oral Gel 15 Gram(s) Oral once PRN Blood Glucose LESS THAN 70 milliGRAM(s)/deciliter    Allergies: No Known Allergies      Vital Signs Last 24 Hrs  T(C): 36.2 (06 Oct 2023 10:39), Max: 37.7 (05 Oct 2023 16:41)  T(F): 97.2 (06 Oct 2023 10:39), Max: 99.9 (05 Oct 2023 23:36)  HR: 102 (06 Oct 2023 10:39) (89 - 105)  BP: 99/67 (06 Oct 2023 10:39) (99/67 - 112/73)  BP(mean): --  RR: 16 (06 Oct 2023 10:39) (16 - 19)  SpO2: 95% (06 Oct 2023 10:39) (95% - 98%)    Parameters below as of 06 Oct 2023 10:39  Patient On (Oxygen Delivery Method): nasal cannula    LABS: All Labs Reviewed:                        12.0   12.20 )-----------( 373      ( 06 Oct 2023 07:40 )             36.3              06 Oct 2023 07:40    135    |  103    |  28     ----------------------------<  247    3.6     |  24     |  1.62     Ca    8.1        06 Oct 2023 07:40  Mg     2.3       06 Oct 2023 07:40    Echo:  < from: TTE Echo Complete w/o Contrast w/ Doppler (09.26.23 @ 13:20) >  Summary:   1. Technically limited study.   2. Left ventricular ejection fraction, by visual estimation, is 20%.   3. Severely decreased global left ventricular systolic function.   4. Multiple left ventricular regional wall motion abnormalities exist.   See wall motion findings.   5. Elevated left ventricular end-diastolic pressure.   6. Spectral Doppler shows pseudonormal pattern of left ventricular   myocardial filling (Grade II diastolic dysfunction).   7. Global longitudinal strain using [employer] software at a HR of 121bpm and BP   137/99 is -5.1%, which is severely reduced. There is some sparing of the   basal segments.   8. Moderately reduced RVsystolic function.   9. The left atrium is normal in size.  10. Trivial pericardial effusion.  11. Mild tricuspid regurgitation.    Interpretation of Telemetry: Overnight on telemetry ST  V-paced       Physical Exam:  Appearance: [ ] Normal  [ ] abnormal [X ] NAD   Eyes: [ ] PERRL [ ] EOMI  HEENT: [ ] Normal [ ] Abnormal oral mucosa [ ]NC/AT  Cardiovascular: [X ] S1 [X ] S2 [ ] RRR [ ] m/r/g [ ]edema [ ] JVP  Procedural Access Site: N/A  Respiratory: [X ] Clear to auscultation bilaterally  Gastrointestinal: [ ] Soft [ ] tenderness[ ] distension [ ] BS  Musculoskeletal: [ ] clubbing [ ] joint deformity   Neurologic: [ ] Non-focal  Lymphatic: [ ] lymphadenopathy  Psychiatry: [X ] AAOx3  [ ] confused [ ] disoriented [ ] Mood & affect appropriate  Skin: [ ]  rashes [ ] ecchymoses [ ] cyanosis

## 2023-10-06 NOTE — PROGRESS NOTE ADULT - ASSESSMENT
59 yo m pmhx CVA with mild left sided deficits, HTN, DM2, vertigo, HLD, Mobitz II s/pp Medtronic dual chamber ICD (12/21/22) biba from home with complaints of dyspnea and chest pain and was admitted w/ acute hypoxic respiratory failure likely due to acute pulmonary edema due to acute HFrEF in setting of acute NSTEMI, LAURA and enterovirus infection.  Echo: 9/26/23 LVEF 20%. +WMA. Grade 2 DD.  Global longitudinal strain using GE software at a HR of 121bpm and /99 is -5.1%, which is severely reduced. There is some sparing of the basal segments.  Echo: 12/20/22 Mild mitral regurgitation. Normal right ventricular size and function.    1) Acute Hypoxic Respiratory Failure 2ndary Acute HFrEF and +entero/rhinovirus/ +Pseudomonas  2) NSTEMI (troponin 24,293)  3) New Cardiomyopathy (LVEF 20%) s/p ICD ([employer], DDD, 12/21/22)  4) HTN  5) DM-2    - patient appears comfortable, no chest pain, SOB, breathing improved, still needs 3L NC oxygen saturation 94%  - patient [facility] w/ bradycardia/ Mobitz type 2 AVB w/normal LVEF, plan was for PPM but upgraded to ICD for questionable sarcoidosis  - c/w ASA/Plavix  - c/w Bumex 2mg IV BID, Toprol XL 12.5mg BID, unable to optimize GDMT with renal insufficiency and hypotension  - c/w strict I&O, maintain negative balance, daily weight  - Antibiotics as per primary team, patient having persistent fevers past 24 hours, last temp 100.6F, when infection resolves and cultures negative, will then arrange for transfer for cardiac cath.

## 2023-10-07 LAB
ANION GAP SERPL CALC-SCNC: 9 MMOL/L — SIGNIFICANT CHANGE UP (ref 5–17)
BUN SERPL-MCNC: 26 MG/DL — HIGH (ref 7–23)
CALCIUM SERPL-MCNC: 8.2 MG/DL — LOW (ref 8.5–10.1)
CHLORIDE SERPL-SCNC: 104 MMOL/L — SIGNIFICANT CHANGE UP (ref 96–108)
CO2 SERPL-SCNC: 22 MMOL/L — SIGNIFICANT CHANGE UP (ref 22–31)
CREAT SERPL-MCNC: 1.59 MG/DL — HIGH (ref 0.5–1.3)
EGFR: 51 ML/MIN/1.73M2 — LOW
GLUCOSE BLDC GLUCOMTR-MCNC: 236 MG/DL — HIGH (ref 70–99)
GLUCOSE BLDC GLUCOMTR-MCNC: 257 MG/DL — HIGH (ref 70–99)
GLUCOSE BLDC GLUCOMTR-MCNC: 275 MG/DL — HIGH (ref 70–99)
GLUCOSE BLDC GLUCOMTR-MCNC: 282 MG/DL — HIGH (ref 70–99)
GLUCOSE BLDC GLUCOMTR-MCNC: 378 MG/DL — HIGH (ref 70–99)
GLUCOSE BLDC GLUCOMTR-MCNC: 403 MG/DL — HIGH (ref 70–99)
GLUCOSE BLDC GLUCOMTR-MCNC: 481 MG/DL — CRITICAL HIGH (ref 70–99)
GLUCOSE SERPL-MCNC: 259 MG/DL — HIGH (ref 70–99)
HCT VFR BLD CALC: 35.9 % — LOW (ref 39–50)
HGB BLD-MCNC: 11.7 G/DL — LOW (ref 13–17)
MAGNESIUM SERPL-MCNC: 2.4 MG/DL — SIGNIFICANT CHANGE UP (ref 1.6–2.6)
MCHC RBC-ENTMCNC: 25.7 PG — LOW (ref 27–34)
MCHC RBC-ENTMCNC: 32.6 G/DL — SIGNIFICANT CHANGE UP (ref 32–36)
MCV RBC AUTO: 78.7 FL — LOW (ref 80–100)
NRBC # BLD: 0 /100 WBCS — SIGNIFICANT CHANGE UP (ref 0–0)
PHOSPHATE SERPL-MCNC: 2.8 MG/DL — SIGNIFICANT CHANGE UP (ref 2.5–4.5)
PLATELET # BLD AUTO: 372 K/UL — SIGNIFICANT CHANGE UP (ref 150–400)
POTASSIUM SERPL-MCNC: 3.9 MMOL/L — SIGNIFICANT CHANGE UP (ref 3.5–5.3)
POTASSIUM SERPL-SCNC: 3.9 MMOL/L — SIGNIFICANT CHANGE UP (ref 3.5–5.3)
RBC # BLD: 4.56 M/UL — SIGNIFICANT CHANGE UP (ref 4.2–5.8)
RBC # FLD: 13.9 % — SIGNIFICANT CHANGE UP (ref 10.3–14.5)
SODIUM SERPL-SCNC: 135 MMOL/L — SIGNIFICANT CHANGE UP (ref 135–145)
WBC # BLD: 10.67 K/UL — HIGH (ref 3.8–10.5)
WBC # FLD AUTO: 10.67 K/UL — HIGH (ref 3.8–10.5)

## 2023-10-07 PROCEDURE — 99232 SBSQ HOSP IP/OBS MODERATE 35: CPT

## 2023-10-07 RX ORDER — INSULIN LISPRO 100/ML
10 VIAL (ML) SUBCUTANEOUS
Refills: 0 | Status: DISCONTINUED | OUTPATIENT
Start: 2023-10-07 | End: 2023-10-08

## 2023-10-07 RX ORDER — LOPERAMIDE HCL 2 MG
2 TABLET ORAL DAILY
Refills: 0 | Status: DISCONTINUED | OUTPATIENT
Start: 2023-10-08 | End: 2023-10-13

## 2023-10-07 RX ORDER — LOPERAMIDE HCL 2 MG
2 TABLET ORAL ONCE
Refills: 0 | Status: COMPLETED | OUTPATIENT
Start: 2023-10-07 | End: 2023-10-07

## 2023-10-07 RX ORDER — INSULIN HUMAN 100 [IU]/ML
10 INJECTION, SOLUTION SUBCUTANEOUS ONCE
Refills: 0 | Status: COMPLETED | OUTPATIENT
Start: 2023-10-07 | End: 2023-10-07

## 2023-10-07 RX ADMIN — CEFEPIME 100 MILLIGRAM(S): 1 INJECTION, POWDER, FOR SOLUTION INTRAMUSCULAR; INTRAVENOUS at 18:31

## 2023-10-07 RX ADMIN — INSULIN GLARGINE 24 UNIT(S): 100 INJECTION, SOLUTION SUBCUTANEOUS at 22:15

## 2023-10-07 RX ADMIN — CEFEPIME 100 MILLIGRAM(S): 1 INJECTION, POWDER, FOR SOLUTION INTRAMUSCULAR; INTRAVENOUS at 06:53

## 2023-10-07 RX ADMIN — Medication 8 UNIT(S): at 09:07

## 2023-10-07 RX ADMIN — Medication 12.5 MILLIGRAM(S): at 06:06

## 2023-10-07 RX ADMIN — TAMSULOSIN HYDROCHLORIDE 0.4 MILLIGRAM(S): 0.4 CAPSULE ORAL at 22:15

## 2023-10-07 RX ADMIN — PANTOPRAZOLE SODIUM 40 MILLIGRAM(S): 20 TABLET, DELAYED RELEASE ORAL at 18:32

## 2023-10-07 RX ADMIN — Medication 1: at 22:17

## 2023-10-07 RX ADMIN — CLOPIDOGREL BISULFATE 75 MILLIGRAM(S): 75 TABLET, FILM COATED ORAL at 11:48

## 2023-10-07 RX ADMIN — Medication 8 UNIT(S): at 16:47

## 2023-10-07 RX ADMIN — Medication 2 MILLIGRAM(S): at 14:47

## 2023-10-07 RX ADMIN — Medication 8 UNIT(S): at 11:47

## 2023-10-07 RX ADMIN — Medication 4: at 09:06

## 2023-10-07 RX ADMIN — Medication 12.5 MILLIGRAM(S): at 18:47

## 2023-10-07 RX ADMIN — INSULIN HUMAN 10 UNIT(S): 100 INJECTION, SOLUTION SUBCUTANEOUS at 19:15

## 2023-10-07 RX ADMIN — ATORVASTATIN CALCIUM 80 MILLIGRAM(S): 80 TABLET, FILM COATED ORAL at 22:15

## 2023-10-07 RX ADMIN — Medication 6: at 11:47

## 2023-10-07 RX ADMIN — BUMETANIDE 2 MILLIGRAM(S): 0.25 INJECTION INTRAMUSCULAR; INTRAVENOUS at 14:48

## 2023-10-07 RX ADMIN — Medication 12: at 16:46

## 2023-10-07 RX ADMIN — Medication 81 MILLIGRAM(S): at 11:49

## 2023-10-07 RX ADMIN — BUMETANIDE 2 MILLIGRAM(S): 0.25 INJECTION INTRAMUSCULAR; INTRAVENOUS at 07:19

## 2023-10-07 RX ADMIN — CHLORHEXIDINE GLUCONATE 1 APPLICATION(S): 213 SOLUTION TOPICAL at 06:06

## 2023-10-07 RX ADMIN — ENOXAPARIN SODIUM 40 MILLIGRAM(S): 100 INJECTION SUBCUTANEOUS at 06:04

## 2023-10-07 RX ADMIN — PANTOPRAZOLE SODIUM 40 MILLIGRAM(S): 20 TABLET, DELAYED RELEASE ORAL at 06:04

## 2023-10-07 NOTE — PROGRESS NOTE ADULT - SUBJECTIVE AND OBJECTIVE BOX
SUBJECTIVE / OVERNIGHT EVENTS:    Patient seen and examined bedside.   low grade fever x 1 at midnight otherwise no overnight events     REVIEW OF SYSTEMS: remaining ROS negative     MEDICATIONS  (STANDING):  aspirin enteric coated 81 milliGRAM(s) Oral daily  atorvastatin 80 milliGRAM(s) Oral at bedtime  buMETAnide Injectable 2 milliGRAM(s) IV Push two times a day  cefepime   IVPB 2000 milliGRAM(s) IV Intermittent every 12 hours  cefepime   IVPB      chlorhexidine 2% Cloths 1 Application(s) Topical <User Schedule>  clopidogrel Tablet 75 milliGRAM(s) Oral daily  enoxaparin Injectable 40 milliGRAM(s) SubCutaneous every 24 hours  influenza   Vaccine 0.5 milliLiter(s) IntraMuscular once  insulin glargine Injectable (LANTUS) 24 Unit(s) SubCutaneous at bedtime  insulin lispro (ADMELOG) corrective regimen sliding scale   SubCutaneous at bedtime  insulin lispro (ADMELOG) corrective regimen sliding scale   SubCutaneous three times a day before meals  insulin lispro Injectable (ADMELOG) 8 Unit(s) SubCutaneous three times a day before meals  metoprolol succinate ER 12.5 milliGRAM(s) Oral two times a day  pantoprazole  Injectable 40 milliGRAM(s) IV Push two times a day  potassium chloride    Tablet ER 20 milliEquivalent(s) Oral every 2 hours  tamsulosin 0.4 milliGRAM(s) Oral at bedtime    MEDICATIONS  (PRN):  acetaminophen     Tablet .. 650 milliGRAM(s) Oral every 6 hours PRN Temp greater or equal to 38C (100.4F), Mild Pain (1 - 3)  albuterol    0.083% 2.5 milliGRAM(s) Nebulizer every 6 hours PRN Shortness of Breath and/or Wheezing  dextrose Oral Gel 15 Gram(s) Oral once PRN Blood Glucose LESS THAN 70 milliGRAM(s)/deciliter        PHYSICAL EXAM:  Vital Signs Last 24 Hrs  T(C): 37 (07 Oct 2023 15:35), Max: 37.8 (07 Oct 2023 00:01)  T(F): 98.6 (07 Oct 2023 15:35), Max: 100 (07 Oct 2023 00:01)  HR: 94 (07 Oct 2023 10:40) (94 - 105)  BP: 100/72 (07 Oct 2023 15:35) (98/64 - 120/79)  BP(mean): --  RR: 18 (07 Oct 2023 15:35) (17 - 18)  SpO2: 97% (07 Oct 2023 15:35) (95% - 100%)          PHYSICAL EXAM:  GENERAL: NAD,  no increased WOB  HEAD:  Atraumatic, Normocephalic  EYES: EOMI, PERRLA, conjunctiva and sclera clear  ENMT: No tonsillar erythema, exudates, or enlargement   NECK: Supple, No JVD   NERVOUS SYSTEM:  Alert & Oriented X3, nonfocal   CHEST/LUNG: CTAB;  No rales, rhonchi, wheezing, or rubs  HEART: Regular rate and rhythm; No murmurs, rubs, or gallops  ABDOMEN: Soft, Nontender, Nondistended; Bowel sounds present  EXTREMITIES:  2+ Peripheral Pulses b/l, No clubbing, cyanosis, calf tenderness or edema b/l         LABS:                                   11.7   10.67 )-----------( 372      ( 07 Oct 2023 07:15 )             35.9   10-07    135  |  104  |  26<H>  ----------------------------<  259<H>  3.9   |  22  |  1.59<H>    Ca    8.2<L>      07 Oct 2023 07:15  Phos  2.8     10-07  Mg     2.4     10-07            Urinalysis Basic - ( 06 Oct 2023 07:40 )    Color: x / Appearance: x / SG: x / pH: x  Gluc: 247 mg/dL / Ketone: x  / Bili: x / Urobili: x   Blood: x / Protein: x / Nitrite: x   Leuk Esterase: x / RBC: x / WBC x   Sq Epi: x / Non Sq Epi: x / Bacteria: x        SARS-CoV-2: NotDetec (28 Sep 2023 12:40)  SARS-CoV-2: NotDetec (26 Sep 2023 00:39)      Consultant(s) Notes Reveiwed [x ] Yes     Care Discussed with [x ] Consultants  [ x] Patient  [ ] Family  [x ] /   [x ] Other; RN

## 2023-10-07 NOTE — PROGRESS NOTE ADULT - SUBJECTIVE AND OBJECTIVE BOX
NP Progress Note     Follow Up: Chest Pain/Elevated Troponin/CHF exacerbation     HPI:  57 yo m pmhx CVA with mild left sided deficits, HTN, DM2, vertigo, HLD, Mobitz II s/pp Medtronic dual chamber ICD (12/21/22) biba from home with complaints of sob/chest pain.  Per patient's wife cp began yesterday however persistent today and with associated worsening sob.  In ED patient tachypneic, tachycardic.  Labs significant for Trop 6791.6, Cr 1.42, bnp and lactate 2.9.  Patient given 2L IVF, lactate worsening, rapidly worsening sob and hypoxia.  Patient seen at bedside, on NRB with spo2 88-92% tachypenic, appears uncomfortable.  Coarse b/l.  Bedside POCUS exam performed, diffuse b lines, ivf plump, difficultly visualizing chambers however overall function appears decreased based off his previous tte.  Patient ordered for Bipap and lasix 80mg.  Stat TTE ordered.  Admit to ICU.      Subjective/Observations: Pt. seen and examined and evaluated. Pt. resting comfortably in bed in NAD, with no respiratory distress, no chest pain, dyspnea, palpitations, PND, or orthopnea.    REVIEW OF SYSTEMS: All other review of systems is negative unless indicated above    PAST MEDICAL & SURGICAL HISTORY:  CVA (cerebral vascular accident)  HTN (hypertension)  DM (diabetes mellitus)    MEDICATIONS  (STANDING):  aspirin enteric coated 81 milliGRAM(s) Oral daily  atorvastatin 80 milliGRAM(s) Oral at bedtime  buMETAnide Injectable 2 milliGRAM(s) IV Push two times a day  cefepime   IVPB      cefepime   IVPB 2000 milliGRAM(s) IV Intermittent every 12 hours  chlorhexidine 2% Cloths 1 Application(s) Topical <User Schedule>  clopidogrel Tablet 75 milliGRAM(s) Oral daily  enoxaparin Injectable 40 milliGRAM(s) SubCutaneous every 24 hours  influenza   Vaccine 0.5 milliLiter(s) IntraMuscular once  insulin glargine Injectable (LANTUS) 24 Unit(s) SubCutaneous at bedtime  insulin lispro (ADMELOG) corrective regimen sliding scale   SubCutaneous three times a day before meals  insulin lispro (ADMELOG) corrective regimen sliding scale   SubCutaneous at bedtime  insulin lispro Injectable (ADMELOG) 8 Unit(s) SubCutaneous three times a day before meals  metoprolol succinate ER 12.5 milliGRAM(s) Oral two times a day  pantoprazole  Injectable 40 milliGRAM(s) IV Push two times a day  potassium chloride    Tablet ER 20 milliEquivalent(s) Oral every 2 hours  tamsulosin 0.4 milliGRAM(s) Oral at bedtime    MEDICATIONS  (PRN):  acetaminophen     Tablet .. 650 milliGRAM(s) Oral every 6 hours PRN Temp greater or equal to 38C (100.4F), Mild Pain (1 - 3)  albuterol    0.083% 2.5 milliGRAM(s) Nebulizer every 6 hours PRN Shortness of Breath and/or Wheezing  dextrose Oral Gel 15 Gram(s) Oral once PRN Blood Glucose LESS THAN 70 milliGRAM(s)/deciliter    Allergies: No Known Allergies      Vital Signs Last 24 Hrs  Vital Signs Last 24 Hrs  T(C): 37 (07 Oct 2023 15:35), Max: 37.8 (07 Oct 2023 00:01)  T(F): 98.6 (07 Oct 2023 15:35), Max: 100 (07 Oct 2023 00:01)  HR: 94 (07 Oct 2023 10:40) (94 - 105)  BP: 100/72 (07 Oct 2023 15:35) (100/72 - 120/79)  BP(mean): --  RR: 18 (07 Oct 2023 15:35) (17 - 18)  SpO2: 97% (07 Oct 2023 15:35) (95% - 100%)        LABS: All Labs Reviewed:                        12.0   12.20 )-----------( 373      ( 06 Oct 2023 07:40 )             36.3              06 Oct 2023 07:40    135    |  103    |  28     ----------------------------<  247    3.6     |  24     |  1.62     Ca    8.1        06 Oct 2023 07:40  Mg     2.3       06 Oct 2023 07:40    Echo:  < from: TTE Echo Complete w/o Contrast w/ Doppler (09.26.23 @ 13:20) >  Summary:   1. Technically limited study.   2. Left ventricular ejection fraction, by visual estimation, is 20%.   3. Severely decreased global left ventricular systolic function.   4. Multiple left ventricular regional wall motion abnormalities exist.   See wall motion findings.   5. Elevated left ventricular end-diastolic pressure.   6. Spectral Doppler shows pseudonormal pattern of left ventricular   myocardial filling (Grade II diastolic dysfunction).   7. Global longitudinal strain using [employer] software at a HR of 121bpm and BP   137/99 is -5.1%, which is severely reduced. There is some sparing of the   basal segments.   8. Moderately reduced RVsystolic function.   9. The left atrium is normal in size.  10. Trivial pericardial effusion.  11. Mild tricuspid regurgitation.    Interpretation of Telemetry: Overnight on telemetry ST  V-paced       Physical Exam:  Appearance: [ ] Normal  [ ] abnormal [X ] NAD   Eyes: [ ] PERRL [ ] EOMI  HEENT: [ ] Normal [ ] Abnormal oral mucosa [ ]NC/AT  Cardiovascular: [X ] S1 [X ] S2 [ ] RRR [ ] m/r/g [ ]edema [ ] JVP  Procedural Access Site: N/A  Respiratory: [X ] Clear to auscultation bilaterally  Gastrointestinal: [ ] Soft [ ] tenderness[ ] distension [ ] BS  Musculoskeletal: [ ] clubbing [ ] joint deformity   Neurologic: [ ] Non-focal  Lymphatic: [ ] lymphadenopathy  Psychiatry: [X ] AAOx3  [ ] confused [ ] disoriented [ ] Mood & affect appropriate  Skin: [ ]  rashes [ ] ecchymoses [ ] cyanosis

## 2023-10-07 NOTE — PROGRESS NOTE ADULT - ASSESSMENT
59 yo m pmhx CVA with mild left sided deficits, HTN, DM2, vertigo, HLD, Mobitz II s/pp Medtronic dual chamber ICD (12/21/22) biba from home with complaints of dyspnea and chest pain and was admitted w/ acute hypoxic respiratory failure likely due to acute pulmonary edema due to acute HFrEF in setting of acute NSTEMI, LAURA and enterovirus infection.  Echo: 9/26/23 LVEF 20%. +WMA. Grade 2 DD.  Global longitudinal strain using [employer] software at a HR of 121bpm and /99 is -5.1%, which is severely reduced. There is some sparing of the basal segments.  Echo: 12/20/22 Mild mitral regurgitation. Normal right ventricular size and function.    1) Acute Hypoxic Respiratory Failure 2ndary Acute HFrEF and +entero/rhinovirus/ +Pseudomonas  2) NSTEMI (troponin 24,293)  3) New Cardiomyopathy (LVEF 20%) s/p ICD ([employer], DDD, 12/21/22)  4) HTN  5) DM-2    - patient appears comfortable, no chest pain, SOB, breathing improved, still needs 3L NC oxygen saturation 94%  - patient initially seen at [facility] [place] w/ bradycardia/ Mobitz type 2 AVB w/normal LVEF, plan was for PPM but upgraded to ICD for questionable sarcoidosis  - c/w ASA/Plavix  - c/w Bumex 2mg IV BID, Toprol XL 12.5mg BID, unable to optimize GDMT with renal insufficiency and hypotension  - c/w strict I&O, maintain negative balance, daily weight  - Antibiotics as per primary team, patient having persistent fevers past 24 hours, last temp 100.6F, when infection resolves and cultures negative, will then arrange for transfer for cardiac cath.    MEDICATIONS  (STANDING):  aspirin enteric coated 81 milliGRAM(s) Oral daily  atorvastatin 80 milliGRAM(s) Oral at bedtime  buMETAnide Injectable 2 milliGRAM(s) IV Push two times a day  cefepime   IVPB      cefepime   IVPB 2000 milliGRAM(s) IV Intermittent every 12 hours  chlorhexidine 2% Cloths 1 Application(s) Topical <User Schedule>  clopidogrel Tablet 75 milliGRAM(s) Oral daily  enoxaparin Injectable 40 milliGRAM(s) SubCutaneous every 24 hours  influenza   Vaccine 0.5 milliLiter(s) IntraMuscular once  insulin glargine Injectable (LANTUS) 24 Unit(s) SubCutaneous at bedtime  insulin lispro (ADMELOG) corrective regimen sliding scale   SubCutaneous at bedtime  insulin lispro (ADMELOG) corrective regimen sliding scale   SubCutaneous three times a day before meals  insulin lispro Injectable (ADMELOG) 10 Unit(s) SubCutaneous three times a day before meals  insulin regular  human recombinant. 10 Unit(s) SubCutaneous once  metoprolol succinate ER 12.5 milliGRAM(s) Oral two times a day  pantoprazole  Injectable 40 milliGRAM(s) IV Push two times a day  tamsulosin 0.4 milliGRAM(s) Oral at bedtime

## 2023-10-07 NOTE — PROGRESS NOTE ADULT - ASSESSMENT
59 yo m pmhx CVA with mild left sided deficits, HTN, DM2, vertigo, HLD, Mobitz II s/pp Medtronic dual chamber ICD (12/21/22) biba from home with complaints of dyspnea and chest pain and was admitted w/ acute hypoxic respiratory failure likely due to acute pulmonary edema due to acute HFrEF in setting of acute NSTEMI, LAURA and enterovirus infection.     Assessment and Plan:     Acute hypoxic respiratory failure due to acute sys/ diastolic CHF  - CT on admission showed patchy and confluent ground-glass and consolidative opacities primarily in the bilateral upper and lower lobes c/w a combination of infection and edema and small bilateral pleural effusions  - patient has improved w/ Lasix and Bipap, now on NC  - Echo showed EF 20% w/ severely decreased global left ventricular systolic function w/ multiple left ventricular regional wall motion abnormalities & grade II diastolic dysfunction.   - c/w Bumex &   metoprolol     Elevated Trop  - likely due to NSTEMI   - trop peaked at 24,293  - as per cardio, will likely need transfer for cardiac catheterization, once infectious symptoms resolve  - c/w ASA, Plavix & Lipitor  - heparin gtt stopped in ICU     Fevers  - likely due to PNA as CT showed hazy groundglass opacities in the upper lobes w/ mild to moderate bilateral pleural effusions with overlying compressive atelectasis  - s/p Ceftriaxone in ICU    - as per ID, stopped vanc stopped  - Sputum + pseudomonas   - BCx NGTD  10/7 afebrile today so far   - c/w Cefepime    Urinary retention   - Douglass placed 9/30 after multiple straight caths  - urology following   - ongoing TOV starting 10/6    LAURA  - ATN vs cardiorenal  - Cr now improving      HTN  continue with current regimen     DM2   - c/w Lantus, mealtime lispro and ISS,    - hgb A1C is 6.9  - endo following    Thyroid goiter   - CT showed goiter,  3.3 x 2.8 cm lesion in the  superior mediastinum compatible with substernal extension of thyroid  goiter, unchanged compared to 12/27/2022  - TSH is 0.258 but free T4 is 2, repeat tsh wnl  will need outpatient follow-up     Preventative Measures   DVT: Lovenox SQ  GI: Protonix   fall precautions     Dispo: will need transfer to [facility] for cardiac cath   PT: REJI  57 yo m pmhx CVA with mild left sided deficits, HTN, DM2, vertigo, HLD, Mobitz II s/pp Medtronic dual chamber ICD (12/21/22) biba from home with complaints of dyspnea and chest pain and was admitted w/ acute hypoxic respiratory failure likely due to acute pulmonary edema due to acute HFrEF in setting of acute NSTEMI, LAURA and enterovirus infection.     Assessment and Plan:     Acute hypoxic respiratory failure due to acute sys/ diastolic CHF  - CT on admission showed patchy and confluent ground-glass and consolidative opacities primarily in the bilateral upper and lower lobes c/w a combination of infection and edema and small bilateral pleural effusions  - patient has improved w/ Lasix and Bipap, now on NC  - Echo showed EF 20% w/ severely decreased global left ventricular systolic function w/ multiple left ventricular regional wall motion abnormalities & grade II diastolic dysfunction.   - c/w Bumex &   metoprolol     Elevated Trop  - likely due to NSTEMI   - trop peaked at 24,293  - as per cardio, will likely need transfer for cardiac catheterization, once infectious symptoms resolve  - c/w ASA, Plavix & Lipitor  - heparin gtt stopped in ICU     Fevers  - likely due to PNA as CT showed hazy groundglass opacities in the upper lobes w/ mild to moderate bilateral pleural effusions with overlying compressive atelectasis  - s/p Ceftriaxone in ICU    - as per ID, stopped vanc stopped  - Sputum + pseudomonas   - BCx NGTD  10/7 afebrile today so far   - c/w Cefepime    Urinary retention   - Douglass placed 9/30 after multiple straight caths  - urology following   passed TOV 10/6     LAURA  - ATN vs cardiorenal  - Cr now improving      HTN  continue with current regimen     DM2   - c/w Lantus, mealtime lispro and ISS,    - hgb A1C is 6.9  - endo following    Thyroid goiter   - CT showed goiter,  3.3 x 2.8 cm lesion in the  superior mediastinum compatible with substernal extension of thyroid  goiter, unchanged compared to 12/27/2022  - TSH is 0.258 but free T4 is 2, repeat tsh wnl  will need outpatient follow-up     Preventative Measures   DVT: Lovenox SQ  GI: Protonix   fall precautions     Dispo: will need transfer to [facility] for cardiac cath   PT: REJI  57 yo m pmhx CVA with mild left sided deficits, HTN, DM2, vertigo, HLD, Mobitz II s/pp Medtronic dual chamber ICD (12/21/22) biba from home with complaints of dyspnea and chest pain and was admitted w/ acute hypoxic respiratory failure likely due to acute pulmonary edema due to acute HFrEF in setting of acute NSTEMI, LAURA and enterovirus infection.     Assessment and Plan:     Acute hypoxic respiratory failure due to acute sys/ diastolic CHF  - CT on admission showed patchy and confluent ground-glass and consolidative opacities primarily in the bilateral upper and lower lobes c/w a combination of infection and edema and small bilateral pleural effusions  - patient has improved w/ Lasix and Bipap, now on NC  - Echo showed EF 20% w/ severely decreased global left ventricular systolic function w/ multiple left ventricular regional wall motion abnormalities & grade II diastolic dysfunction.   - c/w Bumex &   metoprolol     Elevated Trop  - likely due to NSTEMI   - trop peaked at 24,293  - as per cardio, will likely need transfer for cardiac catheterization, once infectious symptoms resolve  - c/w ASA, Plavix & Lipitor  - heparin gtt stopped in ICU     Fevers  - likely due to PNA as CT showed hazy groundglass opacities in the upper lobes w/ mild to moderate bilateral pleural effusions with overlying compressive atelectasis  - s/p Ceftriaxone in ICU    - as per ID, stopped vanc stopped  - Sputum + pseudomonas   - BCx NGTD  10/7 afebrile today so far   - c/w Cefepime    Urinary retention   - Douglass placed 9/30 after multiple straight caths  - urology following   passed TOV 10/6 per RN, and states patient is urinating, however no documented PVR and per RN Nani, unsure what the PVR is or what the last one was. ordered another bladder scan for PVR and ordered renal US     LAURA  - ATN vs cardiorenal  - Cr now improving      HTN  continue with current regimen     DM2   - c/w Lantus, mealtime lispro and ISS,    - hgb A1C is 6.9  - endo following    Thyroid goiter   - CT showed goiter,  3.3 x 2.8 cm lesion in the  superior mediastinum compatible with substernal extension of thyroid  goiter, unchanged compared to 12/27/2022  - TSH is 0.258 but free T4 is 2, repeat tsh wnl  will need outpatient follow-up     Preventative Measures   DVT: Lovenox SQ  GI: Protonix   fall precautions     Dispo: will need transfer to [facility] for cardiac cath   PT: REJI

## 2023-10-08 LAB
ANION GAP SERPL CALC-SCNC: 7 MMOL/L — SIGNIFICANT CHANGE UP (ref 5–17)
BUN SERPL-MCNC: 29 MG/DL — HIGH (ref 7–23)
CALCIUM SERPL-MCNC: 8.6 MG/DL — SIGNIFICANT CHANGE UP (ref 8.5–10.1)
CHLORIDE SERPL-SCNC: 102 MMOL/L — SIGNIFICANT CHANGE UP (ref 96–108)
CO2 SERPL-SCNC: 25 MMOL/L — SIGNIFICANT CHANGE UP (ref 22–31)
CREAT SERPL-MCNC: 1.7 MG/DL — HIGH (ref 0.5–1.3)
EGFR: 47 ML/MIN/1.73M2 — LOW
GLUCOSE BLDC GLUCOMTR-MCNC: 200 MG/DL — HIGH (ref 70–99)
GLUCOSE BLDC GLUCOMTR-MCNC: 213 MG/DL — HIGH (ref 70–99)
GLUCOSE BLDC GLUCOMTR-MCNC: 241 MG/DL — HIGH (ref 70–99)
GLUCOSE BLDC GLUCOMTR-MCNC: 337 MG/DL — HIGH (ref 70–99)
GLUCOSE BLDC GLUCOMTR-MCNC: 362 MG/DL — HIGH (ref 70–99)
GLUCOSE SERPL-MCNC: 214 MG/DL — HIGH (ref 70–99)
MAGNESIUM SERPL-MCNC: 2.3 MG/DL — SIGNIFICANT CHANGE UP (ref 1.6–2.6)
PHOSPHATE SERPL-MCNC: 3.6 MG/DL — SIGNIFICANT CHANGE UP (ref 2.5–4.5)
POTASSIUM SERPL-MCNC: 3.8 MMOL/L — SIGNIFICANT CHANGE UP (ref 3.5–5.3)
POTASSIUM SERPL-SCNC: 3.8 MMOL/L — SIGNIFICANT CHANGE UP (ref 3.5–5.3)
SODIUM SERPL-SCNC: 134 MMOL/L — LOW (ref 135–145)

## 2023-10-08 PROCEDURE — 76775 US EXAM ABDO BACK WALL LIM: CPT | Mod: 26

## 2023-10-08 PROCEDURE — 99232 SBSQ HOSP IP/OBS MODERATE 35: CPT

## 2023-10-08 RX ORDER — INSULIN LISPRO 100/ML
14 VIAL (ML) SUBCUTANEOUS
Refills: 0 | Status: DISCONTINUED | OUTPATIENT
Start: 2023-10-08 | End: 2023-10-09

## 2023-10-08 RX ORDER — INSULIN GLARGINE 100 [IU]/ML
28 INJECTION, SOLUTION SUBCUTANEOUS AT BEDTIME
Refills: 0 | Status: DISCONTINUED | OUTPATIENT
Start: 2023-10-08 | End: 2023-10-09

## 2023-10-08 RX ADMIN — Medication 12.5 MILLIGRAM(S): at 05:53

## 2023-10-08 RX ADMIN — BUMETANIDE 2 MILLIGRAM(S): 0.25 INJECTION INTRAMUSCULAR; INTRAVENOUS at 05:53

## 2023-10-08 RX ADMIN — Medication 10 UNIT(S): at 08:30

## 2023-10-08 RX ADMIN — Medication 12.5 MILLIGRAM(S): at 17:50

## 2023-10-08 RX ADMIN — CHLORHEXIDINE GLUCONATE 1 APPLICATION(S): 213 SOLUTION TOPICAL at 05:55

## 2023-10-08 RX ADMIN — Medication 14 UNIT(S): at 17:50

## 2023-10-08 RX ADMIN — Medication 650 MILLIGRAM(S): at 00:42

## 2023-10-08 RX ADMIN — ATORVASTATIN CALCIUM 80 MILLIGRAM(S): 80 TABLET, FILM COATED ORAL at 21:52

## 2023-10-08 RX ADMIN — TAMSULOSIN HYDROCHLORIDE 0.4 MILLIGRAM(S): 0.4 CAPSULE ORAL at 21:53

## 2023-10-08 RX ADMIN — Medication 4: at 12:20

## 2023-10-08 RX ADMIN — ENOXAPARIN SODIUM 40 MILLIGRAM(S): 100 INJECTION SUBCUTANEOUS at 05:53

## 2023-10-08 RX ADMIN — Medication 4: at 08:30

## 2023-10-08 RX ADMIN — CLOPIDOGREL BISULFATE 75 MILLIGRAM(S): 75 TABLET, FILM COATED ORAL at 12:20

## 2023-10-08 RX ADMIN — CEFEPIME 100 MILLIGRAM(S): 1 INJECTION, POWDER, FOR SOLUTION INTRAMUSCULAR; INTRAVENOUS at 05:54

## 2023-10-08 RX ADMIN — CEFEPIME 100 MILLIGRAM(S): 1 INJECTION, POWDER, FOR SOLUTION INTRAMUSCULAR; INTRAVENOUS at 17:49

## 2023-10-08 RX ADMIN — Medication 10 UNIT(S): at 12:21

## 2023-10-08 RX ADMIN — INSULIN GLARGINE 28 UNIT(S): 100 INJECTION, SOLUTION SUBCUTANEOUS at 22:45

## 2023-10-08 RX ADMIN — Medication 81 MILLIGRAM(S): at 12:20

## 2023-10-08 RX ADMIN — BUMETANIDE 2 MILLIGRAM(S): 0.25 INJECTION INTRAMUSCULAR; INTRAVENOUS at 14:16

## 2023-10-08 RX ADMIN — PANTOPRAZOLE SODIUM 40 MILLIGRAM(S): 20 TABLET, DELAYED RELEASE ORAL at 05:53

## 2023-10-08 RX ADMIN — PANTOPRAZOLE SODIUM 40 MILLIGRAM(S): 20 TABLET, DELAYED RELEASE ORAL at 17:48

## 2023-10-08 RX ADMIN — Medication 8: at 17:50

## 2023-10-08 NOTE — PROGRESS NOTE ADULT - SUBJECTIVE AND OBJECTIVE BOX
CHIEF COMPLAINT:  Patient is a 58y old  Male who presents with a chief complaint of CHF Exacerbation (07 Oct 2023 19:07)      HPI:  57 yo m pmhx CVA with mild left sided deficits, HTN, DM2, vertigo, HLD, Mobitz II s/pp Medtronic dual chamber ICD (12/21/22) biba from home with complaints of sob/chest pain.  Per patient's wife cp began yesterday however persistent today and with associated worsening sob.  In ED patient tachypneic, tachycardic.  Labs significant for Trop 6791.6, Cr 1.42, bnp and lactate 2.9.  Patient given 2L IVF, lactate worsening, rapidly worsening sob and hypoxia.  Patient seen at bedside, on NRB with spo2 88-92% tachypenic, appears uncomfortable.  Coarse b/l.  Bedside POCUS exam performed, diffuse b lines, ivf plump, difficultly visualizing chambers however overall function appears decreased based off his previous tte.  Patient ordered for Bipap and lasix 80mg.  Stat TTE ordered.  Admit to ICU.        REVIEW OF SYSTEMS:  General:  No wt loss, fevers, chills, night sweats  Eyes:  Good vision, no reported pain  ENT:  No sore throat, pain, runny nose, dysphagia  CV:  No pain, palpitations, hypo/hypertension  Resp:  No dyspnea, cough, tachypnea, wheezing  GI:  No pain, nausea, vomiting, diarrhea, constipation  :  No pain, bleeding, incontinence, nocturia  Muscle:  No pain, weakness  Breast:  No pain, abscess, mass, discharge  Neuro:  No weakness, tingling, memory problems  Psych:  No fatigue, insomnia, mood problems, depression  Endocrine:  No polyuria, polydipsia, cold/heat intolerance  Heme:  No petechiae, ecchymosis, easy bruisability  Skin:  No rash, edema    PHYSICAL EXAM:  Vital Signs:  Vital Signs Last 24 Hrs  T(C): 37 (08 Oct 2023 10:54), Max: 38.1 (08 Oct 2023 00:30)  T(F): 98.6 (08 Oct 2023 10:54), Max: 100.6 (08 Oct 2023 00:30)  HR: 98 (08 Oct 2023 10:54) (91 - 102)  BP: 105/68 (08 Oct 2023 10:54) (100/72 - 109/72)  RR: 18 (08 Oct 2023 10:54) (18 - 18)  SpO2: 100% (08 Oct 2023 10:54) (97% - 100%)      Tele:     Constitutional: well developed, normal appearance, well groomed, well nourished, no deformities and no acute distress.   Eyes: the conjunctiva exhibited no abnormalities and the eyelids demonstrated no xanthelasmas.   HEENT: normal oral mucosa, no oral pallor and no oral cyanosis.   Neck: normal jugular venous A waves present, normal jugular venous V waves present and no jugular venous bowers A waves.   Pulmonary: no respiratory distress, normal respiratory rhythm and effort, no accessory muscle use and lungs were clear to auscultation bilaterally.   Cardiovascular: heart rate and rhythm were normal, normal S1 and S2 and no murmur, gallop, rub, heave or thrill are present.   Abdomen: soft, non-tender, no hepato-splenomegaly and no abdominal mass palpated.   Musculoskeletal: the gait could not be assessed.   Extremities: no clubbing of the fingernails, no localized cyanosis, no petechial hemorrhages and no ischemic changes.   Skin: normal skin color and pigmentation, no rash, no venous stasis, no skin lesions, no skin ulcer and no xanthoma was observed.   Psychiatric: oriented to person, place, and time, the affect was normal, the mood was normal and not feeling anxious.      LABORATORY:                          11.7   10.67 )-----------( 372      ( 07 Oct 2023 07:15 )             35.9     10-08    134<L>  |  102  |  29<H>  ----------------------------<  214<H>  3.8   |  25  |  1.70<H>    Ca    8.6      08 Oct 2023 07:00  Phos  3.6     10-08  Mg     2.3     10-08        CAPILLARY BLOOD GLUCOSE  POCT Blood Glucose.: 241 mg/dL (08 Oct 2023 11:58)  POCT Blood Glucose.: 213 mg/dL (08 Oct 2023 07:50)  POCT Blood Glucose.: 257 mg/dL (07 Oct 2023 22:09)  POCT Blood Glucose.: 282 mg/dL (07 Oct 2023 21:05)  POCT Blood Glucose.: 378 mg/dL (07 Oct 2023 18:29)  POCT Blood Glucose.: 403 mg/dL (07 Oct 2023 16:34)  POCT Blood Glucose.: 481 mg/dL (07 Oct 2023 16:33)        Urinalysis Basic - ( 08 Oct 2023 07:00 )    Color: x / Appearance: x / SG: x / pH: x  Gluc: 214 mg/dL / Ketone: x  / Bili: x / Urobili: x   Blood: x / Protein: x / Nitrite: x   Leuk Esterase: x / RBC: x / WBC x   Sq Epi: x / Non Sq Epi: x / Bacteria: x      Troponin I, High Sensitivity Result: 17791.3      Pro-Brain Natriuretic Peptide: 67658 pg/mL (09.30.23 @ 06:35)      IMAGING:  < from: Xray Chest 2 Views PA/Lat (10.02.23 @ 19:35) >  Moderate bilateral pleural effusions and/or posterior   basilar airspace consolidations obscuring LEFT diaphragm contour.  Please see same day chest CT report..    < end of copied text >      < from: TTE Echo Complete w/o Contrast w/ Doppler (09.26.23 @ 13:20) >    Summary:   1. Technically limited study.   2. Left ventricular ejection fraction, by visual estimation, is 20%.   3. Severely decreased global left ventricular systolic function.   4. Multiple left ventricular regional wall motion abnormalities exist.   See wall motion findings.   5. Elevated left ventricular end-diastolic pressure.   6. Spectral Doppler shows pseudonormal pattern of left ventricular   myocardial filling (Grade II diastolic dysfunction).   7. Global longitudinal strain using [employer] software at a HR of 121bpm and BP   137/99 is -5.1%, which is severely reduced. There is some sparing of the   basal segments.   8. Moderately reduced RVsystolic function.   9. The left atrium is normal in size.  10. Trivial pericardial effusion.  11. Mild tricuspid regurgitation.    < end of copied text >      ASSESSMENT:        PLAN:     MEDICATIONS  (STANDING):  aspirin enteric coated 81 milliGRAM(s) Oral daily  atorvastatin 80 milliGRAM(s) Oral at bedtime  buMETAnide Injectable 2 milliGRAM(s) IV Push two times a day  cefepime   IVPB 2000 milliGRAM(s) IV Intermittent every 12 hours  cefepime   IVPB      chlorhexidine 2% Cloths 1 Application(s) Topical <User Schedule>  clopidogrel Tablet 75 milliGRAM(s) Oral daily  enoxaparin Injectable 40 milliGRAM(s) SubCutaneous every 24 hours  influenza   Vaccine 0.5 milliLiter(s) IntraMuscular once  insulin glargine Injectable (LANTUS) 24 Unit(s) SubCutaneous at bedtime  insulin lispro (ADMELOG) corrective regimen sliding scale   SubCutaneous three times a day before meals  insulin lispro (ADMELOG) corrective regimen sliding scale   SubCutaneous at bedtime  insulin lispro Injectable (ADMELOG) 10 Unit(s) SubCutaneous three times a day before meals  metoprolol succinate ER 12.5 milliGRAM(s) Oral two times a day  pantoprazole  Injectable 40 milliGRAM(s) IV Push two times a day  tamsulosin 0.4 milliGRAM(s) Oral at bedtime    Jm Watkins MD, FACC, FASEM, FASNC, FACP  Director, Heart Failure Services  [facility]  , Department of Cardiology  [facility]     CHIEF COMPLAINT:  Patient is a 58y old  Male who presents with a chief complaint of sob/cp/pna/HF (07 Oct 2023 19:07)      HPI:  He is a 58-year-old with history of dyslipidemia, type 2 diabetes, hypertension, CVA and left-sided deficits, history of Medtronic dual-chamber ICD admitted with shortness of breath, chest pain, and hypoxia from pulmonary edema with concomitant comorbid conditions of LAURA, non-STEMI, heart failure reduced EF decompensation and enterovirus infection along with pneumonia.  Still having febrile illness.  Also having watery stools.  EF 20% with significantly elevated troponin.  Previously on heparin infusion now off.      REVIEW OF SYSTEMS:  General:  No wt loss, fevers, chills, night sweats  Eyes:  Good vision, no reported pain  ENT:  No sore throat, pain, runny nose, dysphagia  CV:  No pain, palpitations, hypo/hypertension  Resp:  No dyspnea, cough, tachypnea, wheezing  GI:  No pain, nausea, vomiting, diarrhea, constipation  :  No pain, bleeding, incontinence, nocturia  Muscle:  No pain, weakness  Breast:  No pain, abscess, mass, discharge  Neuro:  No weakness, tingling, memory problems  Psych:  No fatigue, insomnia, mood problems, depression  Endocrine:  No polyuria, polydipsia, cold/heat intolerance  Heme:  No petechiae, ecchymosis, easy bruisability  Skin:  No rash, edema    PHYSICAL EXAM:  Vital Signs:  Vital Signs Last 24 Hrs  T(C): 37 (08 Oct 2023 10:54), Max: 38.1 (08 Oct 2023 00:30)  T(F): 98.6 (08 Oct 2023 10:54), Max: 100.6 (08 Oct 2023 00:30)  HR: 98 (08 Oct 2023 10:54) (91 - 102)  BP: 105/68 (08 Oct 2023 10:54) (100/72 - 109/72)  RR: 18 (08 Oct 2023 10:54) (18 - 18)  SpO2: 100% (08 Oct 2023 10:54) (97% - 100%)      Tele:     Constitutional: well developed, normal appearance, well groomed, well nourished, no deformities and no acute distress.   Eyes: the conjunctiva exhibited no abnormalities and the eyelids demonstrated no xanthelasmas.   HEENT: normal oral mucosa, no oral pallor and no oral cyanosis.   Neck: normal jugular venous A waves present, normal jugular venous V waves present and no jugular venous bowers A waves.   Pulmonary: no respiratory distress, normal respiratory rhythm and effort, no accessory muscle use.  Cardiovascular: heart rate and rhythm were normal, normal S1 and S2 and no murmur.  Abdomen: soft, non-tender.  Musculoskeletal: the gait could not be assessed.   Extremities: no clubbing of the fingernails, no localized cyanosis, no petechial hemorrhages and no ischemic changes.   Skin: normal skin color and pigmentation, no rash, no venous stasis, no skin lesions, no skin ulcer and no xanthoma was observed.       LABORATORY:                          11.7   10.67 )-----------( 372      ( 07 Oct 2023 07:15 )             35.9     10-08    134<L>  |  102  |  29<H>  ----------------------------<  214<H>  3.8   |  25  |  1.70<H>    Ca    8.6      08 Oct 2023 07:00  Phos  3.6     10-08  Mg     2.3     10-08        CAPILLARY BLOOD GLUCOSE  POCT Blood Glucose.: 241 mg/dL (08 Oct 2023 11:58)  POCT Blood Glucose.: 213 mg/dL (08 Oct 2023 07:50)  POCT Blood Glucose.: 257 mg/dL (07 Oct 2023 22:09)  POCT Blood Glucose.: 282 mg/dL (07 Oct 2023 21:05)  POCT Blood Glucose.: 378 mg/dL (07 Oct 2023 18:29)  POCT Blood Glucose.: 403 mg/dL (07 Oct 2023 16:34)  POCT Blood Glucose.: 481 mg/dL (07 Oct 2023 16:33)        Urinalysis Basic - ( 08 Oct 2023 07:00 )    Color: x / Appearance: x / SG: x / pH: x  Gluc: 214 mg/dL / Ketone: x  / Bili: x / Urobili: x   Blood: x / Protein: x / Nitrite: x   Leuk Esterase: x / RBC: x / WBC x   Sq Epi: x / Non Sq Epi: x / Bacteria: x      Troponin I, High Sensitivity Result: 90868.3      Pro-Brain Natriuretic Peptide: 63996 pg/mL (09.30.23 @ 06:35)      IMAGING:  < from: Xray Chest 2 Views PA/Lat (10.02.23 @ 19:35) >  Moderate bilateral pleural effusions and/or posterior   basilar airspace consolidations obscuring LEFT diaphragm contour.  Please see same day chest CT report..    < end of copied text >      < from: TTE Echo Complete w/o Contrast w/ Doppler (09.26.23 @ 13:20) >    Summary:   1. Technically limited study.   2. Left ventricular ejection fraction, by visual estimation, is 20%.   3. Severely decreased global left ventricular systolic function.   4. Multiple left ventricular regional wall motion abnormalities exist.   See wall motion findings.   5. Elevated left ventricular end-diastolic pressure.   6. Spectral Doppler shows pseudonormal pattern of left ventricular   myocardial filling (Grade II diastolic dysfunction).   7. Global longitudinal strain using [employer] software at a HR of 121bpm and BP   137/99 is -5.1%, which is severely reduced. There is some sparing of the   basal segments.   8. Moderately reduced RVsystolic function.   9. The left atrium is normal in size.  10. Trivial pericardial effusion.  11. Mild tricuspid regurgitation.    < end of copied text >      ASSESSMENT:  He is a 58-year-old with history of dyslipidemia, type 2 diabetes, hypertension, CVA and left-sided deficits, history of Medtronic dual-chamber ICD admitted with shortness of breath, chest pain, and hypoxia from pulmonary edema with concomitant comorbid conditions of LAURA, non-STEMI, heart failure reduced EF decompensation and enterovirus infection along with pneumonia.  Still having febrile illness.  Also having watery stools.  EF 20% with significantly elevated troponin.  Previously on heparin infusion now off.      PLAN:     Stay on daily aspirin, Plavix and high-intensity statin for non-STEMI and cardiovascular risk reduction.  Maintain metoprolol ER 12.5 mg twice daily for blood pressure management and heart failure treatment.  Continue with Bumex 2 mg IV twice daily to help with chronic heart failure reduced EF decompensation management.  Avoid Arni, ACE inhibitor and or ARB in the setting of renal dysfunction which is slowly improving.  Douglass catheter to help keep negative fluid status.  DVT prevention with Lovenox subcu.  Broad-spectrum antibiotics per medical team.  Insulin coverage for diabetic control. Will eventually need cardiac catheterization to define underlying coronary ischemic burden once acute infectious issues are resolved. Follow labs and continue telemetry cardiac monitoring.    Jm Watkins MD, FACC, BARB BECKER, FACP  Director, Heart Failure Services  [facility]  , Department of Cardiology  [facility]     CHIEF COMPLAINT:  Patient is a 58y old  Male who presents with a chief complaint of sob/cp/pna/HF (07 Oct 2023 19:07)      HPI:  He is a 58-year-old with history of dyslipidemia, type 2 diabetes, hypertension, CVA and left-sided deficits, history of Medtronic dual-chamber ICD admitted with shortness of breath, chest pain, and hypoxia from pulmonary edema with concomitant comorbid conditions of LAURA, non-STEMI, heart failure reduced EF decompensation and enterovirus infection along with pneumonia.  Still having febrile illness.  Also having watery stools.  EF 20% with significantly elevated troponin.  Previously on heparin infusion now off.      REVIEW OF SYSTEMS:  General:  No wt loss, fevers, chills, night sweats  Eyes:  Good vision, no reported pain  ENT:  No sore throat, pain, runny nose, dysphagia  CV:  No pain, palpitations, hypo/hypertension  Resp:  No dyspnea, cough, tachypnea, wheezing  GI:  No pain, nausea, vomiting, diarrhea, constipation  :  No pain, bleeding, incontinence, nocturia  Muscle:  No pain, weakness  Breast:  No pain, abscess, mass, discharge  Neuro:  No weakness, tingling, memory problems  Psych:  No fatigue, insomnia, mood problems, depression  Endocrine:  No polyuria, polydipsia, cold/heat intolerance  Heme:  No petechiae, ecchymosis, easy bruisability  Skin:  No rash, edema    PHYSICAL EXAM:  Vital Signs:  Vital Signs Last 24 Hrs  T(C): 37 (08 Oct 2023 10:54), Max: 38.1 (08 Oct 2023 00:30)  T(F): 98.6 (08 Oct 2023 10:54), Max: 100.6 (08 Oct 2023 00:30)  HR: 98 (08 Oct 2023 10:54) (91 - 102)  BP: 105/68 (08 Oct 2023 10:54) (100/72 - 109/72)  RR: 18 (08 Oct 2023 10:54) (18 - 18)  SpO2: 100% (08 Oct 2023 10:54) (97% - 100%)      Tele: Vpaced    Constitutional: well developed, normal appearance, well groomed, well nourished, no deformities and no acute distress.   Eyes: the conjunctiva exhibited no abnormalities and the eyelids demonstrated no xanthelasmas.   HEENT: normal oral mucosa, no oral pallor and no oral cyanosis.   Neck: normal jugular venous A waves present, normal jugular venous V waves present and no jugular venous bowers A waves.   Pulmonary: no respiratory distress, normal respiratory rhythm and effort, no accessory muscle use.  Cardiovascular: heart rate and rhythm were normal, normal S1 and S2 and no murmur.  Abdomen: soft, non-tender.  Musculoskeletal: the gait could not be assessed.   Extremities: no clubbing of the fingernails, no localized cyanosis, no petechial hemorrhages and no ischemic changes.   Skin: normal skin color and pigmentation, no rash, no venous stasis, no skin lesions, no skin ulcer and no xanthoma was observed.       LABORATORY:                          11.7   10.67 )-----------( 372      ( 07 Oct 2023 07:15 )             35.9     10-08    134<L>  |  102  |  29<H>  ----------------------------<  214<H>  3.8   |  25  |  1.70<H>    Ca    8.6      08 Oct 2023 07:00  Phos  3.6     10-08  Mg     2.3     10-08        CAPILLARY BLOOD GLUCOSE  POCT Blood Glucose.: 241 mg/dL (08 Oct 2023 11:58)  POCT Blood Glucose.: 213 mg/dL (08 Oct 2023 07:50)  POCT Blood Glucose.: 257 mg/dL (07 Oct 2023 22:09)  POCT Blood Glucose.: 282 mg/dL (07 Oct 2023 21:05)  POCT Blood Glucose.: 378 mg/dL (07 Oct 2023 18:29)  POCT Blood Glucose.: 403 mg/dL (07 Oct 2023 16:34)  POCT Blood Glucose.: 481 mg/dL (07 Oct 2023 16:33)        Urinalysis Basic - ( 08 Oct 2023 07:00 )    Color: x / Appearance: x / SG: x / pH: x  Gluc: 214 mg/dL / Ketone: x  / Bili: x / Urobili: x   Blood: x / Protein: x / Nitrite: x   Leuk Esterase: x / RBC: x / WBC x   Sq Epi: x / Non Sq Epi: x / Bacteria: x      Troponin I, High Sensitivity Result: 32977.3      Pro-Brain Natriuretic Peptide: 69660 pg/mL (09.30.23 @ 06:35)      IMAGING:  < from: Xray Chest 2 Views PA/Lat (10.02.23 @ 19:35) >  Moderate bilateral pleural effusions and/or posterior   basilar airspace consolidations obscuring LEFT diaphragm contour.  Please see same day chest CT report..    < end of copied text >      < from: TTE Echo Complete w/o Contrast w/ Doppler (09.26.23 @ 13:20) >    Summary:   1. Technically limited study.   2. Left ventricular ejection fraction, by visual estimation, is 20%.   3. Severely decreased global left ventricular systolic function.   4. Multiple left ventricular regional wall motion abnormalities exist.   See wall motion findings.   5. Elevated left ventricular end-diastolic pressure.   6. Spectral Doppler shows pseudonormal pattern of left ventricular   myocardial filling (Grade II diastolic dysfunction).   7. Global longitudinal strain using [employer] software at a HR of 121bpm and BP   137/99 is -5.1%, which is severely reduced. There is some sparing of the   basal segments.   8. Moderately reduced RVsystolic function.   9. The left atrium is normal in size.  10. Trivial pericardial effusion.  11. Mild tricuspid regurgitation.    < end of copied text >      ASSESSMENT:  He is a 58-year-old with history of dyslipidemia, type 2 diabetes, hypertension, CVA and left-sided deficits, history of Medtronic dual-chamber ICD admitted with shortness of breath, chest pain, and hypoxia from pulmonary edema with concomitant comorbid conditions of LAURA, non-STEMI, heart failure reduced EF decompensation and enterovirus infection along with pneumonia.  Still having febrile illness.  Also having watery stools.  EF 20% with significantly elevated troponin.  Previously on heparin infusion now off.      PLAN:     Stay on daily aspirin, Plavix and high-intensity statin for non-STEMI and cardiovascular risk reduction.  Maintain metoprolol ER 12.5 mg twice daily for blood pressure management and heart failure treatment.  Continue with Bumex 2 mg IV twice daily to help with chronic heart failure reduced EF decompensation management.  Avoid Arni, ACE inhibitor and or ARB in the setting of renal dysfunction which is slowly improving.  Douglass catheter to help keep negative fluid status.  DVT prevention with Lovenox subcu.  Broad-spectrum antibiotics per medical team.  Insulin coverage for diabetic control. Will eventually need cardiac catheterization to define underlying coronary ischemic burden once acute infectious issues are resolved. Follow labs and continue telemetry cardiac monitoring.    Jm Watkins MD, FACC, BARB BECKER, FACP  Director, Heart Failure Services  [facility]  , Department of Cardiology  [facility]

## 2023-10-08 NOTE — PROGRESS NOTE ADULT - ASSESSMENT
59 yo m pmhx CVA with mild left sided deficits, HTN, DM2, vertigo, HLD, Mobitz II s/pp Medtronic dual chamber ICD (12/21/22) biba from home with complaints of dyspnea and chest pain and was admitted w/ acute hypoxic respiratory failure likely due to acute pulmonary edema due to acute HFrEF in setting of acute NSTEMI, LAURA and enterovirus infection.     Acute hypoxic respiratory failure due to acute sys/ diastolic CHF  - CT on admission showed patchy and confluent ground-glass and consolidative opacities primarily in the bilateral upper and lower lobes c/w a combination of infection and edema and small bilateral pleural effusions  - patient has improved w/ Lasix and Bipap, now on NC  - Echo showed EF 20% w/ severely decreased global left ventricular systolic function w/ multiple left ventricular regional wall motion abnormalities & grade II diastolic dysfunction.   - c/w Bumex &   metoprolol     NSTEMI  - trop peaked at 24,293  - as per cardio, will likely need transfer for cardiac catheterization, once infectious symptoms resolve  - c/w ASA, Plavix & Lipitor  - heparin gtt stopped in ICU     Gram Negative Community Acquired Pneumonia   - likely due to PNA as CT showed hazy groundglass opacities in the upper lobes w/ mild to moderate bilateral pleural effusions with overlying compressive atelectasis  - s/p Ceftriaxone in ICU    - as per ID, stopped vanc stopped  - Sputum + pseudomonas   - BCx NGTD  - c/w Cefepime  - wean off NC as tolerated    Urinary retention   - Douglass placed 9/30 after multiple straight caths  - urology following   passed TOV 10/6 , good urine output    LAURA  - ATN vs cardiorenal  - Cr now improving      HTN  continue with current regimen     DM2   - c/w Lantus, mealtime lispro and ISS,    - hgb A1C is 6.9  - endo following    Thyroid goiter   - CT showed goiter,  3.3 x 2.8 cm lesion in the  superior mediastinum compatible with substernal extension of thyroid  goiter, unchanged compared to 12/27/2022  - TSH is 0.258 but free T4 is 2, repeat tsh wnl  will need outpatient follow-up     Preventative Measures   DVT: Lovenox SQ  GI: Protonix   fall precautions     Dispo: will need transfer to [facility] for cardiac cath   PT: REJI

## 2023-10-08 NOTE — PROGRESS NOTE ADULT - SUBJECTIVE AND OBJECTIVE BOX
Patient is a 58y old  Male who presents with a chief complaint of CHF Exacerbation (08 Oct 2023 15:36)      Interval History: Currently stable.  Had abnormal thyroid function test 30 years.  Not on any thyroid medication currently.  Sick thyroid syndrome needs to be ruled out    MEDICATIONS  (STANDING):  aspirin enteric coated 81 milliGRAM(s) Oral daily  atorvastatin 80 milliGRAM(s) Oral at bedtime  buMETAnide Injectable 2 milliGRAM(s) IV Push two times a day  cefepime   IVPB 2000 milliGRAM(s) IV Intermittent every 12 hours  cefepime   IVPB      chlorhexidine 2% Cloths 1 Application(s) Topical <User Schedule>  clopidogrel Tablet 75 milliGRAM(s) Oral daily  enoxaparin Injectable 40 milliGRAM(s) SubCutaneous every 24 hours  influenza   Vaccine 0.5 milliLiter(s) IntraMuscular once  insulin glargine Injectable (LANTUS) 28 Unit(s) SubCutaneous at bedtime  insulin lispro (ADMELOG) corrective regimen sliding scale   SubCutaneous three times a day before meals  insulin lispro (ADMELOG) corrective regimen sliding scale   SubCutaneous at bedtime  insulin lispro Injectable (ADMELOG) 14 Unit(s) SubCutaneous three times a day before meals  metoprolol succinate ER 12.5 milliGRAM(s) Oral two times a day  pantoprazole  Injectable 40 milliGRAM(s) IV Push two times a day  tamsulosin 0.4 milliGRAM(s) Oral at bedtime    MEDICATIONS  (PRN):  acetaminophen     Tablet .. 650 milliGRAM(s) Oral every 6 hours PRN Temp greater or equal to 38C (100.4F), Mild Pain (1 - 3)  albuterol    0.083% 2.5 milliGRAM(s) Nebulizer every 6 hours PRN Shortness of Breath and/or Wheezing  dextrose Oral Gel 15 Gram(s) Oral once PRN Blood Glucose LESS THAN 70 milliGRAM(s)/deciliter  loperamide 2 milliGRAM(s) Oral daily PRN if >3 watery BMs per day      Allergies    No Known Allergies    Intolerances        REVIEW OF SYSTEMS:  CONSTITUTIONAL: no changes  EYES: No eye pain, visual disturbances, or discharge  ENMT:  No difficulty hearing, No sinus or throat pain  NECK: No pain or stiffness  RESPIRATORY: No cough, wheezing, chills or hemoptysis; No shortness of breath  CARDIOVASCULAR: No chest pain, palpitations or leg swelling  GASTROINTESTINAL: No abdominal or epigastric pain. No nausea, vomiting, or hematemesis; No diarrhea or constipation. No melena or hematochezia.  GENITOURINARY: No dysuria, frequency, hematuria, or incontinence  NEUROLOGICAL: No headaches, memory loss, loss of strength, numbness, or tremors  SKIN: No itching, burning, rashes, or lesions   ENDOCRINE: No heat or cold intolerance; No hair loss  MUSCULOSKELETAL: No joint pain or swelling; No muscle, back, or extremity pain  PSYCHIATRIC: No depression, anxiety, mood swings, or difficulty sleeping  HEME/LYMPH: No easy bruising, or bleeding gums  ALLERY AND IMMUNOLOGIC: No hives or eczema    Vital Signs Last 24 Hrs  T(C): 36.6 (08 Oct 2023 15:42), Max: 38.1 (08 Oct 2023 00:30)  T(F): 97.9 (08 Oct 2023 15:42), Max: 100.6 (08 Oct 2023 00:30)  HR: 96 (08 Oct 2023 15:42) (76 - 102)  BP: 112/74 (08 Oct 2023 15:42) (105/68 - 112/74)  BP(mean): --  RR: 18 (08 Oct 2023 15:42) (18 - 18)  SpO2: 100% (08 Oct 2023 15:42) (99% - 100%)    Parameters below as of 08 Oct 2023 15:42  Patient On (Oxygen Delivery Method): nasal cannula  O2 Flow (L/min): 2      PHYSICAL EXAM:  GENERAL:   HEAD: Atraumatic, Normocephalic  EYES: PERRLA, conjunctiva and sclera clear  ENMT: No  exudates,; Moist mucous membranes,, No lesions  NECK: Supple, No JVD, Normal thyroid  NERVOUS SYSTEM:  Alert & Oriented,   CHEST/LUNG: Clear to auscultation bilaterally; No rales, rhonchi, wheezing, or rubs  HEART: Regular rate and rhythm; No murmurs, rubs, or gallops  ABDOMEN: Soft, Nontender, Nondistended; Bowel sounds present  EXTREMITIES:  2+ Peripheral Pulses, no edema  SKIN: No rashes or lesions    LABS:      Urinalysis Basic - ( 08 Oct 2023 07:00 )    Color: x / Appearance: x / SG: x / pH: x  Gluc: 214 mg/dL / Ketone: x  / Bili: x / Urobili: x   Blood: x / Protein: x / Nitrite: x   Leuk Esterase: x / RBC: x / WBC x   Sq Epi: x / Non Sq Epi: x / Bacteria: x      CAPILLARY BLOOD GLUCOSE      POCT Blood Glucose.: 200 mg/dL (08 Oct 2023 22:20)  POCT Blood Glucose.: 337 mg/dL (08 Oct 2023 17:41)  POCT Blood Glucose.: 362 mg/dL (08 Oct 2023 16:36)  POCT Blood Glucose.: 241 mg/dL (08 Oct 2023 11:58)  POCT Blood Glucose.: 213 mg/dL (08 Oct 2023 07:50)    Lipid panel:           Thyroid:  Diabetes Tests:  Parathyroid Panel:  Adrenals:  RADIOLOGY & ADDITIONAL TESTS:    Imaging Personally Reviewed:  [ ] YES  [ ] NO    Consultant(s) Notes Reviewed:  [ ] YES  [ ] NO    Care Discussed with Consultants/Other Providers [ ] YES  [ ] NO

## 2023-10-08 NOTE — PROGRESS NOTE ADULT - SUBJECTIVE AND OBJECTIVE BOX
Patient is a 58y old  Male who presents with a chief complaint of CHF Exacerbation (08 Oct 2023 14:30)      INTERVAL HPI/OVERNIGHT EVENTS: Overnight, no acute events. Doing well this morning, eat breakfast in bed. Denies any chest pain, SOB, N/V. Tmax overnight 100.6F.     ROS  10 point ROS negative, unless stated otherwise.    MEDICATIONS  (STANDING):  aspirin enteric coated 81 milliGRAM(s) Oral daily  atorvastatin 80 milliGRAM(s) Oral at bedtime  buMETAnide Injectable 2 milliGRAM(s) IV Push two times a day  cefepime   IVPB      cefepime   IVPB 2000 milliGRAM(s) IV Intermittent every 12 hours  chlorhexidine 2% Cloths 1 Application(s) Topical <User Schedule>  clopidogrel Tablet 75 milliGRAM(s) Oral daily  enoxaparin Injectable 40 milliGRAM(s) SubCutaneous every 24 hours  influenza   Vaccine 0.5 milliLiter(s) IntraMuscular once  insulin glargine Injectable (LANTUS) 24 Unit(s) SubCutaneous at bedtime  insulin lispro (ADMELOG) corrective regimen sliding scale   SubCutaneous at bedtime  insulin lispro (ADMELOG) corrective regimen sliding scale   SubCutaneous three times a day before meals  insulin lispro Injectable (ADMELOG) 10 Unit(s) SubCutaneous three times a day before meals  metoprolol succinate ER 12.5 milliGRAM(s) Oral two times a day  pantoprazole  Injectable 40 milliGRAM(s) IV Push two times a day  tamsulosin 0.4 milliGRAM(s) Oral at bedtime    MEDICATIONS  (PRN):  acetaminophen     Tablet .. 650 milliGRAM(s) Oral every 6 hours PRN Temp greater or equal to 38C (100.4F), Mild Pain (1 - 3)  albuterol    0.083% 2.5 milliGRAM(s) Nebulizer every 6 hours PRN Shortness of Breath and/or Wheezing  dextrose Oral Gel 15 Gram(s) Oral once PRN Blood Glucose LESS THAN 70 milliGRAM(s)/deciliter  loperamide 2 milliGRAM(s) Oral daily PRN if >3 watery BMs per day      Allergies    No Known Allergies    Intolerances    Vital Signs Last 24 Hrs  T(C): 37 (08 Oct 2023 10:54), Max: 38.1 (08 Oct 2023 00:30)  T(F): 98.6 (08 Oct 2023 10:54), Max: 100.6 (08 Oct 2023 00:30)  HR: 76 (08 Oct 2023 14:12) (76 - 102)  BP: 107/69 (08 Oct 2023 14:12) (105/68 - 109/72)  BP(mean): --  RR: 18 (08 Oct 2023 10:54) (18 - 18)  SpO2: 100% (08 Oct 2023 10:54) (99% - 100%)        PHYSICAL EXAM:  GENERAL: NAD,  no increased WOB  HEAD:  Atraumatic, Normocephalic  EYES: EOMI, PERRLA, conjunctiva and sclera clear  ENMT: No tonsillar erythema, exudates, or enlargement   NECK: Supple, No JVD   NERVOUS SYSTEM:  Alert & Oriented X3, nonfocal   CHEST/LUNG: CTAB;  No rales, rhonchi, wheezing, or rubs  HEART: Regular rate and rhythm; No murmurs, rubs, or gallops  ABDOMEN: Soft, Nontender, Nondistended; Bowel sounds present  EXTREMITIES:  2+ Peripheral Pulses b/l, No clubbing, cyanosis, calf tenderness or edema b/l     LABS:                        11.7   10.67 )-----------( 372      ( 07 Oct 2023 07:15 )             35.9     10-08    134<L>  |  102  |  29<H>  ----------------------------<  214<H>  3.8   |  25  |  1.70<H>    Ca    8.6      08 Oct 2023 07:00  Phos  3.6     10-08  Mg     2.3     10-08        Urinalysis Basic - ( 08 Oct 2023 07:00 )    Color: x / Appearance: x / SG: x / pH: x  Gluc: 214 mg/dL / Ketone: x  / Bili: x / Urobili: x   Blood: x / Protein: x / Nitrite: x   Leuk Esterase: x / RBC: x / WBC x   Sq Epi: x / Non Sq Epi: x / Bacteria: x      CAPILLARY BLOOD GLUCOSE      POCT Blood Glucose.: 241 mg/dL (08 Oct 2023 11:58)  POCT Blood Glucose.: 213 mg/dL (08 Oct 2023 07:50)  POCT Blood Glucose.: 257 mg/dL (07 Oct 2023 22:09)  POCT Blood Glucose.: 282 mg/dL (07 Oct 2023 21:05)  POCT Blood Glucose.: 378 mg/dL (07 Oct 2023 18:29)  POCT Blood Glucose.: 403 mg/dL (07 Oct 2023 16:34)  POCT Blood Glucose.: 481 mg/dL (07 Oct 2023 16:33)      RADIOLOGY & ADDITIONAL TESTS:    Imaging Personally Reviewed:  [ X] YES  [ ] NO    Consultant(s) Notes Reviewed:  [ X] YES  [ ] NO    Care Discussed with Consultants/Other Providers [X ] YES  [ ] NO

## 2023-10-09 LAB
ANION GAP SERPL CALC-SCNC: 8 MMOL/L — SIGNIFICANT CHANGE UP (ref 5–17)
BUN SERPL-MCNC: 29 MG/DL — HIGH (ref 7–23)
CALCIUM SERPL-MCNC: 8.5 MG/DL — SIGNIFICANT CHANGE UP (ref 8.5–10.1)
CHLORIDE SERPL-SCNC: 105 MMOL/L — SIGNIFICANT CHANGE UP (ref 96–108)
CO2 SERPL-SCNC: 23 MMOL/L — SIGNIFICANT CHANGE UP (ref 22–31)
CREAT SERPL-MCNC: 1.56 MG/DL — HIGH (ref 0.5–1.3)
EGFR: 52 ML/MIN/1.73M2 — LOW
GLUCOSE BLDC GLUCOMTR-MCNC: 187 MG/DL — HIGH (ref 70–99)
GLUCOSE BLDC GLUCOMTR-MCNC: 188 MG/DL — HIGH (ref 70–99)
GLUCOSE BLDC GLUCOMTR-MCNC: 254 MG/DL — HIGH (ref 70–99)
GLUCOSE BLDC GLUCOMTR-MCNC: 277 MG/DL — HIGH (ref 70–99)
GLUCOSE BLDC GLUCOMTR-MCNC: 293 MG/DL — HIGH (ref 70–99)
GLUCOSE BLDC GLUCOMTR-MCNC: 372 MG/DL — HIGH (ref 70–99)
GLUCOSE BLDC GLUCOMTR-MCNC: 403 MG/DL — HIGH (ref 70–99)
GLUCOSE BLDC GLUCOMTR-MCNC: 422 MG/DL — HIGH (ref 70–99)
GLUCOSE SERPL-MCNC: 193 MG/DL — HIGH (ref 70–99)
HCT VFR BLD CALC: 36.6 % — LOW (ref 39–50)
HGB BLD-MCNC: 11.7 G/DL — LOW (ref 13–17)
MAGNESIUM SERPL-MCNC: 2.3 MG/DL — SIGNIFICANT CHANGE UP (ref 1.6–2.6)
MCHC RBC-ENTMCNC: 25.4 PG — LOW (ref 27–34)
MCHC RBC-ENTMCNC: 32 G/DL — SIGNIFICANT CHANGE UP (ref 32–36)
MCV RBC AUTO: 79.4 FL — LOW (ref 80–100)
NRBC # BLD: 0 /100 WBCS — SIGNIFICANT CHANGE UP (ref 0–0)
PHOSPHATE SERPL-MCNC: 3.7 MG/DL — SIGNIFICANT CHANGE UP (ref 2.5–4.5)
PLATELET # BLD AUTO: 408 K/UL — HIGH (ref 150–400)
POTASSIUM SERPL-MCNC: 3.6 MMOL/L — SIGNIFICANT CHANGE UP (ref 3.5–5.3)
POTASSIUM SERPL-SCNC: 3.6 MMOL/L — SIGNIFICANT CHANGE UP (ref 3.5–5.3)
RBC # BLD: 4.61 M/UL — SIGNIFICANT CHANGE UP (ref 4.2–5.8)
RBC # FLD: 13.9 % — SIGNIFICANT CHANGE UP (ref 10.3–14.5)
SODIUM SERPL-SCNC: 136 MMOL/L — SIGNIFICANT CHANGE UP (ref 135–145)
WBC # BLD: 10.14 K/UL — SIGNIFICANT CHANGE UP (ref 3.8–10.5)
WBC # FLD AUTO: 10.14 K/UL — SIGNIFICANT CHANGE UP (ref 3.8–10.5)

## 2023-10-09 PROCEDURE — 99232 SBSQ HOSP IP/OBS MODERATE 35: CPT

## 2023-10-09 PROCEDURE — 99233 SBSQ HOSP IP/OBS HIGH 50: CPT

## 2023-10-09 PROCEDURE — 93010 ELECTROCARDIOGRAM REPORT: CPT

## 2023-10-09 RX ORDER — ONDANSETRON 8 MG/1
4 TABLET, FILM COATED ORAL ONCE
Refills: 0 | Status: COMPLETED | OUTPATIENT
Start: 2023-10-09 | End: 2023-10-09

## 2023-10-09 RX ORDER — BUMETANIDE 0.25 MG/ML
2 INJECTION INTRAMUSCULAR; INTRAVENOUS DAILY
Refills: 0 | Status: DISCONTINUED | OUTPATIENT
Start: 2023-10-10 | End: 2023-10-13

## 2023-10-09 RX ORDER — INSULIN GLARGINE 100 [IU]/ML
30 INJECTION, SOLUTION SUBCUTANEOUS AT BEDTIME
Refills: 0 | Status: DISCONTINUED | OUTPATIENT
Start: 2023-10-09 | End: 2023-10-10

## 2023-10-09 RX ORDER — LANOLIN ALCOHOL/MO/W.PET/CERES
3 CREAM (GRAM) TOPICAL ONCE
Refills: 0 | Status: COMPLETED | OUTPATIENT
Start: 2023-10-09 | End: 2023-10-09

## 2023-10-09 RX ORDER — INSULIN LISPRO 100/ML
18 VIAL (ML) SUBCUTANEOUS
Refills: 0 | Status: DISCONTINUED | OUTPATIENT
Start: 2023-10-09 | End: 2023-10-10

## 2023-10-09 RX ADMIN — BUMETANIDE 2 MILLIGRAM(S): 0.25 INJECTION INTRAMUSCULAR; INTRAVENOUS at 05:35

## 2023-10-09 RX ADMIN — Medication 650 MILLIGRAM(S): at 01:35

## 2023-10-09 RX ADMIN — Medication 2: at 09:39

## 2023-10-09 RX ADMIN — Medication 12.5 MILLIGRAM(S): at 05:15

## 2023-10-09 RX ADMIN — Medication 6: at 12:24

## 2023-10-09 RX ADMIN — CHLORHEXIDINE GLUCONATE 1 APPLICATION(S): 213 SOLUTION TOPICAL at 05:34

## 2023-10-09 RX ADMIN — CEFEPIME 100 MILLIGRAM(S): 1 INJECTION, POWDER, FOR SOLUTION INTRAMUSCULAR; INTRAVENOUS at 05:16

## 2023-10-09 RX ADMIN — CLOPIDOGREL BISULFATE 75 MILLIGRAM(S): 75 TABLET, FILM COATED ORAL at 11:32

## 2023-10-09 RX ADMIN — ATORVASTATIN CALCIUM 80 MILLIGRAM(S): 80 TABLET, FILM COATED ORAL at 22:17

## 2023-10-09 RX ADMIN — Medication 12.5 MILLIGRAM(S): at 18:13

## 2023-10-09 RX ADMIN — Medication 650 MILLIGRAM(S): at 00:35

## 2023-10-09 RX ADMIN — Medication 14 UNIT(S): at 12:24

## 2023-10-09 RX ADMIN — PANTOPRAZOLE SODIUM 40 MILLIGRAM(S): 20 TABLET, DELAYED RELEASE ORAL at 05:16

## 2023-10-09 RX ADMIN — Medication 12: at 18:11

## 2023-10-09 RX ADMIN — Medication 14 UNIT(S): at 18:12

## 2023-10-09 RX ADMIN — ENOXAPARIN SODIUM 40 MILLIGRAM(S): 100 INJECTION SUBCUTANEOUS at 05:35

## 2023-10-09 RX ADMIN — TAMSULOSIN HYDROCHLORIDE 0.4 MILLIGRAM(S): 0.4 CAPSULE ORAL at 22:17

## 2023-10-09 RX ADMIN — Medication 14 UNIT(S): at 09:39

## 2023-10-09 RX ADMIN — INSULIN GLARGINE 30 UNIT(S): 100 INJECTION, SOLUTION SUBCUTANEOUS at 22:17

## 2023-10-09 RX ADMIN — ONDANSETRON 4 MILLIGRAM(S): 8 TABLET, FILM COATED ORAL at 22:17

## 2023-10-09 RX ADMIN — Medication 3 MILLIGRAM(S): at 23:47

## 2023-10-09 RX ADMIN — Medication 1: at 22:15

## 2023-10-09 RX ADMIN — Medication 81 MILLIGRAM(S): at 11:32

## 2023-10-09 RX ADMIN — BUMETANIDE 2 MILLIGRAM(S): 0.25 INJECTION INTRAMUSCULAR; INTRAVENOUS at 14:29

## 2023-10-09 NOTE — PROGRESS NOTE ADULT - ASSESSMENT
59 yo m pmhx CVA with mild left sided deficits, HTN, DM2, vertigo, HLD, Mobitz II s/pp Med[employer] dual chamber ICD (12/21/22) biba from home with complaints of dyspnea and chest pain and was admitted w/ acute hypoxic respiratory failure likely due to acute pulmonary edema due to acute HFrEF in setting of acute NSTEMI, LAURA and enterovirus infection.     Echo: 9/26/23 LVEF 20%. +WMA. Grade 2 DD.  Global longitudinal strain using GE software at a HR of 121bpm and /99 is -5.1%, which is severely reduced. There is some sparing of the basal segments.  Echo: 12/20/22 Mild mitral regurgitation. Normal right ventricular size and function.    1) Acute Hypoxic Respiratory Failure 2ndary Acute HFrEF and +entero/rhinovirus/ +Pseudomonas  2) NSTEMI (troponin 24,293)  3) New Cardiomyopathy (LVEF 20%) s/p ICD ([employer], DDD, 12/21/22)  4) HTN  5) DM-2    - patient appears comfortable, no chest pain, SOB, breathing improved, still needs 3L NC oxygen saturation 94%  - tele reviewed: paced 90s  - patient initially seen at [facility] w/ bradycardia/ Mobitz type 2 AVB w/normal LVEF, plan was for PPM but upgraded to ICD for questionable sarcoidosis  - Echo reviewed +WMA, LVEF 20%  - c/w ASA/Plavix  - c/w Bumex 2mg IV BID, Toprol XL 12.5mg BID, unable to optimize GDMT with renal insufficiency and hypotension  - c/w strict I&O, maintain negative balance, daily weight  - Antibiotics as per primary team, patient having persistent fevers past 24 hours, last temp 100.6F, when infection resolves and cultures negative, will then arrange for transfer for cardiac cath. 59 yo m pmhx CVA with mild left sided deficits, HTN, DM2, vertigo, HLD, Mobitz II s/pp Medtronic dual chamber ICD (12/21/22) biba from home with complaints of dyspnea and chest pain and was admitted w/ acute hypoxic respiratory failure.  Found to have fever with +enterovirus infection.   Trop peaked at [address]  Echo: 9/26/23 LVEF 20%. +WMA. Grade 2 DD. Mod reduced RV function   Echo: 12/20/22 Mild mitral regurgitation. Normal left ventricular systolic function.     1) Acute Hypoxic Respiratory Failure 2ndary Acute HFrEF and +entero/rhinovirus  2) NSTEMI   3) New Cardiomyopathy (LVEF 20%)     - patient appears comfortable, no chest pain, SOB  - tele reviewed: paced rhythm   - patient seen at [facility] w/ bradycardia/ Mobitz type 2 AVB w/normal LVEF, plan was for PPM but upgraded to ICD for questionable sarcoidosis, which was then ruled out with PET scan later on   - Echo reviewed +WMA, LVEF 20%  - c/w DAPT, s/p IV heparin x 72 hrs while in ICU  - No overt fluid overload noted, currently on Bumex 2mg IV BID, will switch to bumex PO daily  -cont with Toprol XL 12.5mg BID, unable to optimize GDMT with renal insufficiency and low BP  - c/w strict I&O, maintain negative balance, daily weight  - Antibiotics as per primary team/ID, patient having intermittent fevers, T max 100.1F this am, when infection resolves and ID clears, will then arrange for transfer for cardiac cath.

## 2023-10-09 NOTE — PROGRESS NOTE ADULT - SUBJECTIVE AND OBJECTIVE BOX
Patient is a 58y old  Male who presents with a chief complaint of CHF Exacerbation (09 Oct 2023 14:13)      INTERVAL HPI/OVERNIGHT EVENTS: Overnight no acute events. Patient has no complaints this am. Still with intermittent low grade fevers.     MEDICATIONS  (STANDING):  aspirin enteric coated 81 milliGRAM(s) Oral daily  atorvastatin 80 milliGRAM(s) Oral at bedtime  cefepime   IVPB 2000 milliGRAM(s) IV Intermittent every 12 hours  cefepime   IVPB      chlorhexidine 2% Cloths 1 Application(s) Topical <User Schedule>  clopidogrel Tablet 75 milliGRAM(s) Oral daily  enoxaparin Injectable 40 milliGRAM(s) SubCutaneous every 24 hours  influenza   Vaccine 0.5 milliLiter(s) IntraMuscular once  insulin glargine Injectable (LANTUS) 28 Unit(s) SubCutaneous at bedtime  insulin lispro (ADMELOG) corrective regimen sliding scale   SubCutaneous at bedtime  insulin lispro (ADMELOG) corrective regimen sliding scale   SubCutaneous three times a day before meals  insulin lispro Injectable (ADMELOG) 14 Unit(s) SubCutaneous three times a day before meals  metoprolol succinate ER 12.5 milliGRAM(s) Oral two times a day  pantoprazole  Injectable 40 milliGRAM(s) IV Push two times a day  tamsulosin 0.4 milliGRAM(s) Oral at bedtime    MEDICATIONS  (PRN):  acetaminophen     Tablet .. 650 milliGRAM(s) Oral every 6 hours PRN Temp greater or equal to 38C (100.4F), Mild Pain (1 - 3)  albuterol    0.083% 2.5 milliGRAM(s) Nebulizer every 6 hours PRN Shortness of Breath and/or Wheezing  dextrose Oral Gel 15 Gram(s) Oral once PRN Blood Glucose LESS THAN 70 milliGRAM(s)/deciliter  loperamide 2 milliGRAM(s) Oral daily PRN if >3 watery BMs per day      Allergies    No Known Allergies    Intolerances        REVIEW OF SYSTEMS:  10 point ROS negative, unless stated otherwise.      Vital Signs Last 24 Hrs  T(C): 36.6 (09 Oct 2023 17:47), Max: 37.8 (09 Oct 2023 00:18)  T(F): 97.9 (09 Oct 2023 17:47), Max: 100.1 (09 Oct 2023 00:18)  HR: 98 (09 Oct 2023 17:47) (89 - 102)  BP: 106/70 (09 Oct 2023 17:47) (106/70 - 112/73)  BP(mean): --  RR: 18 (09 Oct 2023 17:47) (18 - 18)  SpO2: 98% (09 Oct 2023 17:47) (95% - 100%)        PHYSICAL EXAM:  GENERAL: NAD,  no increased WOB  HEAD:  Atraumatic, Normocephalic  EYES: EOMI, PERRLA, conjunctiva and sclera clear  ENMT: No tonsillar erythema, exudates, or enlargement   NECK: Supple, No JVD   NERVOUS SYSTEM:  Alert & Oriented X3, nonfocal   CHEST/LUNG: CTAB;  No rales, rhonchi, wheezing, or rubs  HEART: Regular rate and rhythm; No murmurs, rubs, or gallops  ABDOMEN: Soft, Nontender, Nondistended; Bowel sounds present  EXTREMITIES:  2+ Peripheral Pulses b/l, No clubbing, cyanosis, calf tenderness or edema b/l     LABS:                        11.7   10.14 )-----------( 408      ( 09 Oct 2023 08:52 )             36.6     10-09    136  |  105  |  29<H>  ----------------------------<  193<H>  3.6   |  23  |  1.56<H>    Ca    8.5      09 Oct 2023 08:52  Phos  3.7     10-09  Mg     2.3     10-09        Urinalysis Basic - ( 09 Oct 2023 08:52 )    Color: x / Appearance: x / SG: x / pH: x  Gluc: 193 mg/dL / Ketone: x  / Bili: x / Urobili: x   Blood: x / Protein: x / Nitrite: x   Leuk Esterase: x / RBC: x / WBC x   Sq Epi: x / Non Sq Epi: x / Bacteria: x      CAPILLARY BLOOD GLUCOSE      POCT Blood Glucose.: 403 mg/dL (09 Oct 2023 18:48)  POCT Blood Glucose.: 422 mg/dL (09 Oct 2023 18:07)  POCT Blood Glucose.: 421 mg/dL (09 Oct 2023 17:54)  POCT Blood Glucose.: 254 mg/dL (09 Oct 2023 11:40)  POCT Blood Glucose.: 188 mg/dL (09 Oct 2023 09:16)  POCT Blood Glucose.: 200 mg/dL (08 Oct 2023 22:20)      RADIOLOGY & ADDITIONAL TESTS:    Imaging Personally Reviewed:  [ X] YES  [ ] NO    Consultant(s) Notes Reviewed:  [ X] YES  [ ] NO    Care Discussed with Consultants/Other Providers [X ] YES  [ ] NO

## 2023-10-09 NOTE — PROGRESS NOTE ADULT - SUBJECTIVE AND OBJECTIVE BOX
BRANDEN MARRUFO  MRN-[id number]    Follow Up:  PNA    Interval History: the pt was seen and examined earlier, no acute distress, pt seems with more energy today. Pt had a low grade temp yesterday, NC 3L, no wbc.     PAST MEDICAL & SURGICAL HISTORY:  CVA (cerebral vascular accident)      HTN (hypertension)      DM (diabetes mellitus)      HTN (hypertension)      CVA (cerebrovascular accident)      HTN (hypertension)      DM (diabetes mellitus)      HTN (hypertension)      S/P hernia repair  right inguinal hernia 1992      S/P cystoscopy  2012          ROS:    [ ] Unobtainable because:  [x ] All other systems negative    Constitutional: no fever, no chills  Head: no trauma  Eyes: no vision changes, no eye pain  ENT:  no sore throat, no rhinorrhea  Cardiovascular:  no chest pain, no palpitation  Respiratory:  no SOB, no cough  GI:  no abd pain, no vomiting, no diarrhea  urinary: no dysuria, no hematuria, no flank pain  musculoskeletal:  no joint pain, no joint swelling  skin:  no rash  neurology:  no headache, no seizure, no change in mental status  psych: no anxiety, no depression         Allergies  No Known Allergies        ANTIMICROBIALS:  cefepime   IVPB 2000 every 12 hours  cefepime   IVPB        OTHER MEDS:  acetaminophen     Tablet .. 650 milliGRAM(s) Oral every 6 hours PRN  albuterol    0.083% 2.5 milliGRAM(s) Nebulizer every 6 hours PRN  aspirin enteric coated 81 milliGRAM(s) Oral daily  atorvastatin 80 milliGRAM(s) Oral at bedtime  buMETAnide Injectable 2 milliGRAM(s) IV Push two times a day  chlorhexidine 2% Cloths 1 Application(s) Topical <User Schedule>  clopidogrel Tablet 75 milliGRAM(s) Oral daily  dextrose Oral Gel 15 Gram(s) Oral once PRN  enoxaparin Injectable 40 milliGRAM(s) SubCutaneous every 24 hours  influenza   Vaccine 0.5 milliLiter(s) IntraMuscular once  insulin glargine Injectable (LANTUS) 28 Unit(s) SubCutaneous at bedtime  insulin lispro (ADMELOG) corrective regimen sliding scale   SubCutaneous three times a day before meals  insulin lispro (ADMELOG) corrective regimen sliding scale   SubCutaneous at bedtime  insulin lispro Injectable (ADMELOG) 14 Unit(s) SubCutaneous three times a day before meals  loperamide 2 milliGRAM(s) Oral daily PRN  metoprolol succinate ER 12.5 milliGRAM(s) Oral two times a day  pantoprazole  Injectable 40 milliGRAM(s) IV Push two times a day  tamsulosin 0.4 milliGRAM(s) Oral at bedtime      Vital Signs Last 24 Hrs  T(C): 36.9 (09 Oct 2023 11:32), Max: 37.8 (09 Oct 2023 00:18)  T(F): 98.5 (09 Oct 2023 11:32), Max: 100.1 (09 Oct 2023 00:18)  HR: 100 (09 Oct 2023 11:32) (76 - 100)  BP: 112/73 (09 Oct 2023 11:32) (107/69 - 112/74)  BP(mean): --  RR: 18 (09 Oct 2023 11:32) (18 - 18)  SpO2: 100% (09 Oct 2023 11:32) (95% - 100%)    Parameters below as of 08 Oct 2023 15:42  Patient On (Oxygen Delivery Method): nasal cannula  O2 Flow (L/min): 2      Physical Exam:  General: Nontoxic-appearing Male in no acute distress. NC  HEENT: AT/NC. Anicteric. Conjunctiva pink and moist. Oropharynx clear.  Neck: Not rigid. No sense of mass.  Nodes: None palpable.  Chest: AICD site benign  Lungs: more clear BS Bilaterally on today's exam, no wheezes, no rhonchi   Heart: Regular rate and rhythm. No Murmur. No rub. No gallop. No palpable thrill.  Abdomen: Bowel sounds present and normoactive. Soft. Nondistended. Nontender. No sense of mass. No organomegaly.  Back: No spinal tenderness. No costovertebral angle tenderness.   Extremities: No cyanosis or clubbing. No edema.   Skin: Warm. Dry. Good turgor. No rash. No vasculitic stigmata.  Psychiatric: Appropriate affect and mood for situation.     WBC Count: 10.14 K/uL (10-09 @ 08:52)  WBC Count: 10.67 K/uL (10-07 @ 07:15)  WBC Count: 12.20 K/uL (10-06 @ 07:40)  WBC Count: 11.60 K/uL (10-05 @ 08:20)  WBC Count: 13.08 K/uL (10-04 @ 07:47)  WBC Count: 13.94 K/uL (10-03 @ 07:37)                            11.7   10.14 )-----------( 408      ( 09 Oct 2023 08:52 )             36.6       10-09    136  |  105  |  29<H>  ----------------------------<  193<H>  3.6   |  23  |  1.56<H>    Ca    8.5      09 Oct 2023 08:52  Phos  3.7     10-09  Mg     2.3     10-09        Urinalysis Basic - ( 09 Oct 2023 08:52 )    Color: x / Appearance: x / SG: x / pH: x  Gluc: 193 mg/dL / Ketone: x  / Bili: x / Urobili: x   Blood: x / Protein: x / Nitrite: x   Leuk Esterase: x / RBC: x / WBC x   Sq Epi: x / Non Sq Epi: x / Bacteria: x        Creatinine Trend: 1.56<--, 1.70<--, 1.59<--, 1.62<--, 1.67<--, 1.81<--      MICROBIOLOGY:  v  .Stool Feces  10-01-23   No enteric pathogens isolated.  (Stool culture examined for Salmonella,  Shigella, Campylobacter, Aeromonas, Plesiomonas,  Vibrio, E.coli O157 and Yersinia)  --  --      Clean Catch Clean Catch (Midstream)  09-30-23   No growth  --  --      .Blood Blood  09-30-23   No growth at 5 days  --  --      .Blood Blood  09-30-23   No growth at 5 days  --  --      .Sputum Sputum  09-28-23   Rare Pseudomonas aeruginosa  Normal Respiratory Tamela present  --  Pseudomonas aeruginosa      .Blood Blood  09-27-23   No growth at 5 days  --  --      .Blood Blood  09-27-23   No growth at 5 days  --  --      .Blood Blood  09-27-23   No growth at 5 days  --  --      .Blood Blood  09-26-23   No growth at 5 days  --  --      Clean Catch Clean Catch (Midstream)  09-26-23   <10,000 CFU/mL Normal Urogenital Tamela  --  --        C-Reactive Protein, Serum: 29 (10-01)  C-Reactive Protein, Serum: 95 (09-26)      SARS-CoV-2: NotDetec (28 Sep 2023 12:40)  SARS-CoV-2: NotDetec (26 Sep 2023 00:39)    RADIOLOGY:

## 2023-10-09 NOTE — PROGRESS NOTE ADULT - SUBJECTIVE AND OBJECTIVE BOX
Patient is a 58y old  Male who presents with a chief complaint of sob/chest pain.    PAST MEDICAL & SURGICAL HISTORY:  CVA (cerebral vascular accident)    HTN (hypertension)    DM (diabetes mellitus)    HTN (hypertension)    S/P hernia repair  right inguinal hernia 1992    S/P cystoscopy  2012    Mobitz type II AV block.    s/p AICD    INTERVAL HISTORY: resting in bed in no acute distress, denies any chest pain or sob   	  MEDICATIONS:  MEDICATIONS  (STANDING):  aspirin enteric coated 81 milliGRAM(s) Oral daily  atorvastatin 80 milliGRAM(s) Oral at bedtime  buMETAnide Injectable 2 milliGRAM(s) IV Push two times a day  cefepime   IVPB 2000 milliGRAM(s) IV Intermittent every 12 hours  chlorhexidine 2% Cloths 1 Application(s) Topical <User Schedule>  clopidogrel Tablet 75 milliGRAM(s) Oral daily  enoxaparin Injectable 40 milliGRAM(s) SubCutaneous every 24 hours  influenza   Vaccine 0.5 milliLiter(s) IntraMuscular once  insulin glargine Injectable (LANTUS) 28 Unit(s) SubCutaneous at bedtime  insulin lispro (ADMELOG) corrective regimen sliding scale   SubCutaneous three times a day before meals  insulin lispro (ADMELOG) corrective regimen sliding scale   SubCutaneous at bedtime  insulin lispro Injectable (ADMELOG) 14 Unit(s) SubCutaneous three times a day before meals  metoprolol succinate ER 12.5 milliGRAM(s) Oral two times a day  pantoprazole  Injectable 40 milliGRAM(s) IV Push two times a day  tamsulosin 0.4 milliGRAM(s) Oral at bedtime    MEDICATIONS  (PRN):  acetaminophen     Tablet .. 650 milliGRAM(s) Oral every 6 hours PRN Temp greater or equal to 38C (100.4F), Mild Pain (1 - 3)  albuterol    0.083% 2.5 milliGRAM(s) Nebulizer every 6 hours PRN Shortness of Breath and/or Wheezing  dextrose Oral Gel 15 Gram(s) Oral once PRN Blood Glucose LESS THAN 70 milliGRAM(s)/deciliter  loperamide 2 milliGRAM(s) Oral daily PRN if >3 watery BMs per day    Vitals:  T(F): 98.5 (10-09-23 @ 11:32), Max: 100.1 (10-09-23 @ 00:18)  HR: 100 (10-09-23 @ 11:32) (89 - 100)  BP: 112/73 (10-09-23 @ 11:32) (108/71 - 112/74)  RR: 18 (10-09-23 @ 11:32) (18 - 18)  SpO2: 100% (10-09-23 @ 11:32) (95% - 100%)    PHYSICAL EXAM:  Neuro: Awake, responsive  CV: S1 S2 RRR  Lungs: CTABL  GI: Soft, BS +, ND, NT  Extremities: No edema    TELEMETRY: sinus     RADIOLOGY:   < from: CT Chest w/ Oral Cont (10.02.23 @ 18:45) >  1. Hazy groundglass opacities in the upper lobes. Mild to moderate   bilateral pleural effusions with overlying compressive atelectasis.   Differential considerations include edema and/or infection.  2. Unremarkable, unenhanced CT of the abdomen and pelvis.    < end of copied text >    DIAGNOSTIC TESTING:    [x ] Echocardiogram:   < from: TTE Echo Complete w/o Contrast w/ Doppler (09.26.23 @ 13:20) >  Left Ventricle: The left ventricular internal cavity size is normal.  Global LV systolic function was severely decreased. Left ventricular   ejection fraction, by visual estimation, is 20%. Spectral Doppler shows   pseudonormal pattern of left ventricular myocardial filling (Grade II   diastolic dysfunction). Elevated left ventricular end-diastolic pressure.   Global longitudinal strain using GE software at a HR of 121bpm and BP   137/99 is -5.1%, which is severely reduced. There is some sparing of the   basal segments.      LV Wall Scoring:  The entire apex is akinetic. The mid anteroseptal segment, mid   inferoseptal  segment, mid inferolateral segment, mid anterolateral segment, mid   anterior  segment, and mid inferior segment are hypokinetic. All remaining scored  segments are normal.    Right Ventricle: RV systolic function is moderately reduced.  Left Atrium: The left atrium is normal in size.  Right Atrium: The right atrium was not well visualized.  Pericardium: Trivial pericardial effusion is present.  Mitral Valve: Structurally normal mitral valve, with normal leaflet   excursion. There is mild mitral annular calcification.  Tricuspid Valve: Mild tricuspid regurgitation is visualized.    Summary:   1. Technically limited study.   2. Left ventricular ejection fraction, by visual estimation, is 20%.   3. Severely decreased global left ventricular systolic function.   4. Multiple left ventricular regional wall motion abnormalities exist.   See wall motion findings.   5. Elevated left ventricular end-diastolic pressure.   6. Spectral Doppler shows pseudonormal pattern of left ventricular   myocardial filling (Grade II diastolic dysfunction).   7. Global longitudinal strain using [employer] software at a HR of 121bpm and BP   137/99 is -5.1%, which is severely reduced. There is some sparing of the   basal segments.   8. Moderately reduced RVsystolic function.   9. The left atrium is normal in size.  10. Trivial pericardial effusion.  11. Mild tricuspid regurgitation.    < end of copied text >    LABS:	 	    09 Oct 2023 08:52    136    |  105    |  29     ----------------------------<  193    3.6     |  23     |  1.56   08 Oct 2023 07:00    134    |  102    |  29     ----------------------------<  214    3.8     |  25     |  1.70   07 Oct 2023 07:15    135    |  104    |  26     ----------------------------<  259    3.9     |  22     |  1.59     Ca    8.5        09 Oct 2023 08:52  Phos  3.7       09 Oct 2023 08:52  Mg     2.3       09 Oct 2023 08:52                        11.7   10.14 )-----------( 408      ( 09 Oct 2023 08:52 )             36.6 ,                       11.7   10.67 )-----------( 372      ( 07 Oct 2023 07:15 )             35.9                Patient is a 58y old  Male who presents with a chief complaint of sob/chest pain.    PAST MEDICAL & SURGICAL HISTORY:  CVA (cerebral vascular accident)    HTN (hypertension)    DM (diabetes mellitus)    S/P hernia repair  right inguinal hernia 1992    S/P cystoscopy  2012    Mobitz type II AV block.    s/p AICD    INTERVAL HISTORY: resting in bed in no acute distress, denies any chest pain or sob   	  MEDICATIONS:  MEDICATIONS  (STANDING):  aspirin enteric coated 81 milliGRAM(s) Oral daily  atorvastatin 80 milliGRAM(s) Oral at bedtime  buMETAnide Injectable 2 milliGRAM(s) IV Push two times a day  cefepime   IVPB 2000 milliGRAM(s) IV Intermittent every 12 hours  chlorhexidine 2% Cloths 1 Application(s) Topical <User Schedule>  clopidogrel Tablet 75 milliGRAM(s) Oral daily  enoxaparin Injectable 40 milliGRAM(s) SubCutaneous every 24 hours  influenza   Vaccine 0.5 milliLiter(s) IntraMuscular once  insulin glargine Injectable (LANTUS) 28 Unit(s) SubCutaneous at bedtime  insulin lispro (ADMELOG) corrective regimen sliding scale   SubCutaneous three times a day before meals  insulin lispro (ADMELOG) corrective regimen sliding scale   SubCutaneous at bedtime  insulin lispro Injectable (ADMELOG) 14 Unit(s) SubCutaneous three times a day before meals  metoprolol succinate ER 12.5 milliGRAM(s) Oral two times a day  pantoprazole  Injectable 40 milliGRAM(s) IV Push two times a day  tamsulosin 0.4 milliGRAM(s) Oral at bedtime    MEDICATIONS  (PRN):  acetaminophen     Tablet .. 650 milliGRAM(s) Oral every 6 hours PRN Temp greater or equal to 38C (100.4F), Mild Pain (1 - 3)  albuterol    0.083% 2.5 milliGRAM(s) Nebulizer every 6 hours PRN Shortness of Breath and/or Wheezing  dextrose Oral Gel 15 Gram(s) Oral once PRN Blood Glucose LESS THAN 70 milliGRAM(s)/deciliter  loperamide 2 milliGRAM(s) Oral daily PRN if >3 watery BMs per day    Vitals:  T(F): 98.5 (10-09-23 @ 11:32), Max: 100.1 (10-09-23 @ 00:18)  HR: 100 (10-09-23 @ 11:32) (89 - 100)  BP: 112/73 (10-09-23 @ 11:32) (108/71 - 112/74)  RR: 18 (10-09-23 @ 11:32) (18 - 18)  SpO2: 100% (10-09-23 @ 11:32) (95% - 100%)    PHYSICAL EXAM:  Neuro: Awake, responsive  CV: S1 S2 RRR  Lungs: CTABL  GI: Soft, BS +, ND, NT  Extremities: No edema    TELEMETRY: sinus     RADIOLOGY:   < from: CT Chest w/ Oral Cont (10.02.23 @ 18:45) >  1. Hazy groundglass opacities in the upper lobes. Mild to moderate   bilateral pleural effusions with overlying compressive atelectasis.   Differential considerations include edema and/or infection.  2. Unremarkable, unenhanced CT of the abdomen and pelvis.    < end of copied text >    DIAGNOSTIC TESTING:    [x ] Echocardiogram:   < from: TTE Echo Complete w/o Contrast w/ Doppler (09.26.23 @ 13:20) >  Left Ventricle: The left ventricular internal cavity size is normal.  Global LV systolic function was severely decreased. Left ventricular   ejection fraction, by visual estimation, is 20%. Spectral Doppler shows   pseudonormal pattern of left ventricular myocardial filling (Grade II   diastolic dysfunction). Elevated left ventricular end-diastolic pressure.   Global longitudinal strain using [employer] software at a HR of 121bpm and BP   137/99 is -5.1%, which is severely reduced. There is some sparing of the   basal segments.      LV Wall Scoring:  The entire apex is akinetic. The mid anteroseptal segment, mid   inferoseptal  segment, mid inferolateral segment, mid anterolateral segment, mid   anterior  segment, and mid inferior segment are hypokinetic. All remaining scored  segments are normal.    Right Ventricle: RV systolic function is moderately reduced.  Left Atrium: The left atrium is normal in size.  Right Atrium: The right atrium was not well visualized.  Pericardium: Trivial pericardial effusion is present.  Mitral Valve: Structurally normal mitral valve, with normal leaflet   excursion. There is mild mitral annular calcification.  Tricuspid Valve: Mild tricuspid regurgitation is visualized.    Summary:   1. Technically limited study.   2. Left ventricular ejection fraction, by visual estimation, is 20%.   3. Severely decreased global left ventricular systolic function.   4. Multiple left ventricular regional wall motion abnormalities exist.   See wall motion findings.   5. Elevated left ventricular end-diastolic pressure.   6. Spectral Doppler shows pseudonormal pattern of left ventricular   myocardial filling (Grade II diastolic dysfunction).   7. Global longitudinal strain using [employer] software at a HR of 121bpm and BP   137/99 is -5.1%, which is severely reduced. There is some sparing of the   basal segments.   8. Moderately reduced RV systolic function.   9. The left atrium is normal in size.  10. Trivial pericardial effusion.  11. Mild tricuspid regurgitation.    < end of copied text >    LABS:	 	    09 Oct 2023 08:52    136    |  105    |  29     ----------------------------<  193    3.6     |  23     |  1.56   08 Oct 2023 07:00    134    |  102    |  29     ----------------------------<  214    3.8     |  25     |  1.70   07 Oct 2023 07:15    135    |  104    |  26     ----------------------------<  259    3.9     |  22     |  1.59     Ca    8.5        09 Oct 2023 08:52  Phos  3.7       09 Oct 2023 08:52  Mg     2.3       09 Oct 2023 08:52                        11.7   10.14 )-----------( 408      ( 09 Oct 2023 08:52 )             36.6 ,                       11.7   10.67 )-----------( 372      ( 07 Oct 2023 07:15 )             35.9

## 2023-10-09 NOTE — PROGRESS NOTE ADULT - ASSESSMENT
59 yo m pmhx CVA with mild left sided deficits, HTN, DM2, vertigo, HLD, Mobitz II s/pp [employer] dual chamber ICD (12/21/22) biba from home with complaints of dyspnea and chest pain and was admitted w/ acute hypoxic respiratory failure likely due to acute pulmonary edema due to acute HFrEF in setting of acute NSTEMI, LAURA and enterovirus infection.     Acute hypoxic respiratory failure due to acute sys/ diastolic CHF  - CT on admission showed patchy and confluent ground-glass and consolidative opacities primarily in the bilateral upper and lower lobes c/w a combination of infection and edema and small bilateral pleural effusions  - patient has improved w/ Lasix and Bipap, now on NC  - Echo showed EF 20% w/ severely decreased global left ventricular systolic function w/ multiple left ventricular regional wall motion abnormalities & grade II diastolic dysfunction.   - c/w Bumex &   metoprolol     NSTEMI  - trop peaked at 24,293  - as per cardio, will likely need transfer for cardiac catheterization, once infectious symptoms resolve  - c/w ASA, Plavix & Lipitor  - heparin gtt stopped in ICU     Gram Negative Community Acquired Pneumonia   - likely due to PNA as CT showed hazy groundglass opacities in the upper lobes w/ mild to moderate bilateral pleural effusions with overlying compressive atelectasis  - s/p Ceftriaxone in ICU    - as per ID, stopped vanc stopped  - Sputum + pseudomonas   - BCx NGTD  - c/w Cefepime  - wean off NC as tolerated    Urinary retention   - Douglass placed 9/30 after multiple straight caths  - urology following   passed TOV 10/6 , good urine output    LAURA  - ATN vs cardiorenal  - Cr now improving      HTN  continue with current regimen     DM2   - c/w Lantus, mealtime lispro and ISS,    - hgb A1C is 6.9  - endo following    Thyroid goiter   - CT showed goiter,  3.3 x 2.8 cm lesion in the  superior mediastinum compatible with substernal extension of thyroid  goiter, unchanged compared to 12/27/2022  - TSH is 0.258 but free T4 is 2, repeat tsh wnl  will need outpatient follow-up     Preventative Measures   DVT: Lovenox SQ  GI: Protonix   fall precautions     Dispo: will need transfer to [facility] for cardiac cath   PT: REJI

## 2023-10-09 NOTE — PROVIDER CONTACT NOTE (OTHER) - SITUATION
Patient alert awake on 3 L N/C covered with sliding scale for FS above 400.  Reports not feeling well

## 2023-10-09 NOTE — PROGRESS NOTE ADULT - ASSESSMENT
58M w PMHx  CAD, CVA, DM (suboptimal compliance, checks FS Q4days though a1c <7) PPM/AICD for bradycardia/Mobitz II, LAD- thyroid tissue in retrospect,  now presenting with pneumonia, CHF, NSTEMI, renal insufficiency. Patient treated for CAP, still febrile though at lease at present hyperthermia blanket could have been playing a role.   Seems out of proportion to attribute to rhinovirus/enterovirus  More extensive pneumonia has a more delayed response to therapy  No RF for resistant fredi elicited  Legionella negative  AICD site benign, cx NTD  Exam otherwise unrevealing for acute infectious process.   COVID-negative x2  Blood cx NTD  TSH not suppressed    10/2: pt is feeling better, however, continues having fevers, had a fever of 101.6  at midnight and 101.2 today midday, on NC, WBC without significant change 11.37, Cr is about the same 1.73, BCs and UCs with no growth to date, abx were changed to cefepime and vanco over the weekend, Cefepime IV continued, Vancomycin stopped today.   Pt will need re-imaging to evaluate for the source of pt's fevers.   Attending Addendum--  Case reviewed with CIARRA Wall. Her note reviewed and modified as appropriate.   Still febrile, though trend has been better  CT's as above    10/3: the pt is somewhat forgetful, low grade temps overnight and this morning, on 3L NC and comfortable, WBC elevated, 13.94, Cr about the same as yesterday, yesterday's CT c/a/p - Hazy groundglass opacities in the upper lobes. Mild to moderate bilateral pleural effusions with overlying compressive atelectasis. Differential considerations include edema and/or infection. Unremarkable, unenhanced CT of the abdomen and pelvis.  Cefepime IV continued   Attending Addendum--  Case reviewed with CIARRA Wall. Her note reviewed and modified as appropriate.   Patient personally assessed and examined.  CT's personally reviewed  Perhaps some moderation in fever curve  Continue cefepime- too early to expect complete response to rx vs bronchiectasis/pneumonia    10/4: had elevated temperatures yesterday evening, none overnight, remains on NC, comfortable, WBC is about the same as yesterday, Cr elevated, will continue with Cefepime IV and will monitor for response.   Attending Addendum--  Case reviewed with CIARRA Wall. Her note reviewed and modified as appropriate.   Still with fever but magnitude and frequency have improved.  Continue cefepime for now  follow temp curve    10/5: no fevers since midday yesterday, NC, WBc better 11.6, Cr better 1.67, BCs remain with no growth to date, Cefepime IV continued.   10/6: improving, afebrile, NC, WBC elevated 12.2, Cr is about the same 1.62, all BCs with no growth to date, today is day #7 of Cefepime IV, duration TBD.   Attending Addendum--  Case reviewed with CIARRA Wall. Her note reviewed and modified as appropriate.   As above  Fever curve improved  Cont abx    10/9: low grade temp yesterday evening, NC, no leukocytosis, US renal - no obstruction, with  ml, all BCs with no growth to date, today is day #10 of Cefepime, will continue to monitor.     Suggestions--  cefepime IV day #10  MRSA nares PCR - negative   CT c/a/p - reviewed   follow culture data  trend temperatures and WBC  monitor pt's renal function   HIV test - negative  wean off supplemental O2 as able    will discuss with Dr. Cornejo

## 2023-10-09 NOTE — CHART NOTE - NSCHARTNOTEFT_GEN_A_CORE
Pt states that he does not feel well. Pt states that he feels palpitations,   __ pt denies any CP/SOB/dizziness/diaphoresis/other complaints    -- BP-- 117/67, HR -- 103-, afebrile, SpO2-- 98 on 2 LPM   Vital Signs Last 24 Hrs  T(C): 36.6 (09 Oct 2023 17:47), Max: 37.8 (09 Oct 2023 00:18)  T(F): 97.9 (09 Oct 2023 17:47), Max: 100.1 (09 Oct 2023 00:18)  HR: 98 (09 Oct 2023 17:47) (89 - 102)  BP: 106/70 (09 Oct 2023 17:47) (106/70 - 112/73)  BP(mean): --  RR: 18 (09 Oct 2023 17:47) (18 - 18)  SpO2: 98% (09 Oct 2023 17:47) (95% - 100%)        - FS-- 422. pt received 12 U of Admelog as per ISS and 14 U of Admelog (premeal)  >> repeat    --- tele -- 100's , V paced,   __  perform ECG  -- continue monitoring and reassess   - -spouse at the bedside__ discussed with nursing staff and pt's spouse

## 2023-10-10 LAB
ANION GAP SERPL CALC-SCNC: 9 MMOL/L — SIGNIFICANT CHANGE UP (ref 5–17)
BUN SERPL-MCNC: 31 MG/DL — HIGH (ref 7–23)
CALCIUM SERPL-MCNC: 8.4 MG/DL — LOW (ref 8.5–10.1)
CHLORIDE SERPL-SCNC: 101 MMOL/L — SIGNIFICANT CHANGE UP (ref 96–108)
CO2 SERPL-SCNC: 22 MMOL/L — SIGNIFICANT CHANGE UP (ref 22–31)
CREAT SERPL-MCNC: 1.58 MG/DL — HIGH (ref 0.5–1.3)
EGFR: 51 ML/MIN/1.73M2 — LOW
GLUCOSE BLDC GLUCOMTR-MCNC: 219 MG/DL — HIGH (ref 70–99)
GLUCOSE BLDC GLUCOMTR-MCNC: 238 MG/DL — HIGH (ref 70–99)
GLUCOSE BLDC GLUCOMTR-MCNC: 248 MG/DL — HIGH (ref 70–99)
GLUCOSE SERPL-MCNC: 248 MG/DL — HIGH (ref 70–99)
HCT VFR BLD CALC: 39.2 % — SIGNIFICANT CHANGE UP (ref 39–50)
HGB BLD-MCNC: 12.7 G/DL — LOW (ref 13–17)
MCHC RBC-ENTMCNC: 25.7 PG — LOW (ref 27–34)
MCHC RBC-ENTMCNC: 32.4 G/DL — SIGNIFICANT CHANGE UP (ref 32–36)
MCV RBC AUTO: 79.4 FL — LOW (ref 80–100)
NRBC # BLD: 0 /100 WBCS — SIGNIFICANT CHANGE UP (ref 0–0)
PLATELET # BLD AUTO: 440 K/UL — HIGH (ref 150–400)
POTASSIUM SERPL-MCNC: 4 MMOL/L — SIGNIFICANT CHANGE UP (ref 3.5–5.3)
POTASSIUM SERPL-SCNC: 4 MMOL/L — SIGNIFICANT CHANGE UP (ref 3.5–5.3)
RBC # BLD: 4.94 M/UL — SIGNIFICANT CHANGE UP (ref 4.2–5.8)
RBC # FLD: 14 % — SIGNIFICANT CHANGE UP (ref 10.3–14.5)
SODIUM SERPL-SCNC: 132 MMOL/L — LOW (ref 135–145)
WBC # BLD: 12.43 K/UL — HIGH (ref 3.8–10.5)
WBC # FLD AUTO: 12.43 K/UL — HIGH (ref 3.8–10.5)

## 2023-10-10 PROCEDURE — 99232 SBSQ HOSP IP/OBS MODERATE 35: CPT

## 2023-10-10 PROCEDURE — 99233 SBSQ HOSP IP/OBS HIGH 50: CPT

## 2023-10-10 RX ORDER — INSULIN LISPRO 100/ML
20 VIAL (ML) SUBCUTANEOUS
Refills: 0 | Status: DISCONTINUED | OUTPATIENT
Start: 2023-10-10 | End: 2023-10-13

## 2023-10-10 RX ORDER — METOPROLOL TARTRATE 50 MG
25 TABLET ORAL DAILY
Refills: 0 | Status: DISCONTINUED | OUTPATIENT
Start: 2023-10-10 | End: 2023-10-13

## 2023-10-10 RX ORDER — PANTOPRAZOLE SODIUM 20 MG/1
40 TABLET, DELAYED RELEASE ORAL
Refills: 0 | Status: DISCONTINUED | OUTPATIENT
Start: 2023-10-10 | End: 2023-10-13

## 2023-10-10 RX ORDER — INSULIN GLARGINE 100 [IU]/ML
35 INJECTION, SOLUTION SUBCUTANEOUS AT BEDTIME
Refills: 0 | Status: DISCONTINUED | OUTPATIENT
Start: 2023-10-10 | End: 2023-10-12

## 2023-10-10 RX ADMIN — Medication 81 MILLIGRAM(S): at 11:13

## 2023-10-10 RX ADMIN — CHLORHEXIDINE GLUCONATE 1 APPLICATION(S): 213 SOLUTION TOPICAL at 09:11

## 2023-10-10 RX ADMIN — Medication 4: at 09:10

## 2023-10-10 RX ADMIN — Medication 650 MILLIGRAM(S): at 08:18

## 2023-10-10 RX ADMIN — Medication 18 UNIT(S): at 09:11

## 2023-10-10 RX ADMIN — CEFEPIME 100 MILLIGRAM(S): 1 INJECTION, POWDER, FOR SOLUTION INTRAMUSCULAR; INTRAVENOUS at 18:20

## 2023-10-10 RX ADMIN — CLOPIDOGREL BISULFATE 75 MILLIGRAM(S): 75 TABLET, FILM COATED ORAL at 11:14

## 2023-10-10 RX ADMIN — ENOXAPARIN SODIUM 40 MILLIGRAM(S): 100 INJECTION SUBCUTANEOUS at 05:12

## 2023-10-10 RX ADMIN — Medication 18 UNIT(S): at 11:16

## 2023-10-10 RX ADMIN — PANTOPRAZOLE SODIUM 40 MILLIGRAM(S): 20 TABLET, DELAYED RELEASE ORAL at 05:19

## 2023-10-10 RX ADMIN — CEFEPIME 100 MILLIGRAM(S): 1 INJECTION, POWDER, FOR SOLUTION INTRAMUSCULAR; INTRAVENOUS at 05:13

## 2023-10-10 RX ADMIN — Medication 20 UNIT(S): at 16:42

## 2023-10-10 RX ADMIN — Medication 4: at 11:14

## 2023-10-10 RX ADMIN — Medication 25 MILLIGRAM(S): at 16:44

## 2023-10-10 RX ADMIN — PANTOPRAZOLE SODIUM 40 MILLIGRAM(S): 20 TABLET, DELAYED RELEASE ORAL at 18:20

## 2023-10-10 RX ADMIN — BUMETANIDE 2 MILLIGRAM(S): 0.25 INJECTION INTRAMUSCULAR; INTRAVENOUS at 05:18

## 2023-10-10 RX ADMIN — Medication 12.5 MILLIGRAM(S): at 05:18

## 2023-10-10 RX ADMIN — Medication 4: at 16:41

## 2023-10-10 NOTE — PROGRESS NOTE ADULT - SUBJECTIVE AND OBJECTIVE BOX
Patient is a 58y old  Male who presents with a chief complaint of CHF Exacerbation (10 Oct 2023 18:00)      Interval History: No changes endocrine wise.  Thyroid function tests are inconsistent with sick thyroid syndrome.  Patient is clinically euthyroid     MEDICATIONS  (STANDING):  aspirin enteric coated 81 milliGRAM(s) Oral daily  atorvastatin 80 milliGRAM(s) Oral at bedtime  buMETAnide 2 milliGRAM(s) Oral daily  cefepime   IVPB 2000 milliGRAM(s) IV Intermittent every 12 hours  cefepime   IVPB      chlorhexidine 2% Cloths 1 Application(s) Topical <User Schedule>  clopidogrel Tablet 75 milliGRAM(s) Oral daily  enoxaparin Injectable 40 milliGRAM(s) SubCutaneous every 24 hours  influenza   Vaccine 0.5 milliLiter(s) IntraMuscular once  insulin glargine Injectable (LANTUS) 35 Unit(s) SubCutaneous at bedtime  insulin lispro (ADMELOG) corrective regimen sliding scale   SubCutaneous three times a day before meals  insulin lispro (ADMELOG) corrective regimen sliding scale   SubCutaneous at bedtime  insulin lispro Injectable (ADMELOG) 20 Unit(s) SubCutaneous three times a day before meals  metoprolol succinate ER 25 milliGRAM(s) Oral daily  pantoprazole    Tablet 40 milliGRAM(s) Oral before breakfast  tamsulosin 0.4 milliGRAM(s) Oral at bedtime    MEDICATIONS  (PRN):  acetaminophen     Tablet .. 650 milliGRAM(s) Oral every 6 hours PRN Temp greater or equal to 38C (100.4F), Mild Pain (1 - 3)  albuterol    0.083% 2.5 milliGRAM(s) Nebulizer every 6 hours PRN Shortness of Breath and/or Wheezing  dextrose Oral Gel 15 Gram(s) Oral once PRN Blood Glucose LESS THAN 70 milliGRAM(s)/deciliter  loperamide 2 milliGRAM(s) Oral daily PRN if >3 watery BMs per day      Allergies    No Known Allergies    Intolerances        REVIEW OF SYSTEMS:  CONSTITUTIONAL: no changes  EYES: No eye pain, visual disturbances, or discharge  ENMT:  No difficulty hearing, No sinus or throat pain  NECK: No pain or stiffness  RESPIRATORY: No cough, wheezing, chills or hemoptysis; No shortness of breath  CARDIOVASCULAR: No chest pain, palpitations or leg swelling  GASTROINTESTINAL: No abdominal or epigastric pain. No nausea, vomiting, or hematemesis; No diarrhea or constipation. No melena or hematochezia.  GENITOURINARY: No dysuria, frequency, hematuria, or incontinence  NEUROLOGICAL: No headaches, memory loss, loss of strength, numbness, or tremors  SKIN: No itching, burning, rashes, or lesions   ENDOCRINE: No heat or cold intolerance; No hair loss  MUSCULOSKELETAL: No joint pain or swelling; No muscle, back, or extremity pain  PSYCHIATRIC: No depression, anxiety, mood swings, or difficulty sleeping  HEME/LYMPH: No easy bruising, or bleeding gums  ALLERY AND IMMUNOLOGIC: No hives or eczema    Vital Signs Last 24 Hrs  T(C): 36.7 (10 Oct 2023 16:52), Max: 37.6 (10 Oct 2023 05:09)  T(F): 98.1 (10 Oct 2023 16:52), Max: 99.7 (10 Oct 2023 05:09)  HR: 98 (10 Oct 2023 16:52) (92 - 116)  BP: 100/63 (10 Oct 2023 16:52) (100/63 - 102/64)  BP(mean): --  RR: 18 (10 Oct 2023 16:52) (18 - 18)  SpO2: 99% (10 Oct 2023 16:52) (95% - 99%)    Parameters below as of 10 Oct 2023 16:52  Patient On (Oxygen Delivery Method): nasal cannula  O2 Flow (L/min): 2      PHYSICAL EXAM:  GENERAL:   HEAD: Atraumatic, Normocephalic  EYES: PERRLA, conjunctiva and sclera clear  ENMT: No  exudates,; Moist mucous membranes,, No lesions  NECK: Supple, No JVD, Normal thyroid  NERVOUS SYSTEM:  Alert & Oriented,   CHEST/LUNG: Clear to auscultation bilaterally; No rales, rhonchi, wheezing, or rubs  HEART: Regular rate and rhythm; No murmurs, rubs, or gallops  ABDOMEN: Soft, Nontender, Nondistended; Bowel sounds present  EXTREMITIES:  2+ Peripheral Pulses, no edema  SKIN: No rashes or lesions    LABS:      Urinalysis Basic - ( 10 Oct 2023 08:20 )    Color: x / Appearance: x / SG: x / pH: x  Gluc: 248 mg/dL / Ketone: x  / Bili: x / Urobili: x   Blood: x / Protein: x / Nitrite: x   Leuk Esterase: x / RBC: x / WBC x   Sq Epi: x / Non Sq Epi: x / Bacteria: x      CAPILLARY BLOOD GLUCOSE      POCT Blood Glucose.: 251 mg/dL (10 Oct 2023 20:53)  POCT Blood Glucose.: 219 mg/dL (10 Oct 2023 16:38)  POCT Blood Glucose.: 248 mg/dL (10 Oct 2023 11:05)  POCT Blood Glucose.: 238 mg/dL (10 Oct 2023 08:31)    Lipid panel:           Thyroid:  Diabetes Tests:  Parathyroid Panel:  Adrenals:  RADIOLOGY & ADDITIONAL TESTS:    Imaging Personally Reviewed:  [ ] YES  [ ] NO    Consultant(s) Notes Reviewed:  [ ] YES  [ ] NO    Care Discussed with Consultants/Other Providers [ ] YES  [ ] NO

## 2023-10-10 NOTE — PROGRESS NOTE ADULT - SUBJECTIVE AND OBJECTIVE BOX
BRANDEN MARRUFO  MRN-[id number]    Follow Up:  PNA    Interval History: the pt was seen and examined earlier, no acute distress, feeling better, pt's wife is present. Pt is afebrile within last 24 hrs, remains on NC, WBC elevated 12.43.     PAST MEDICAL & SURGICAL HISTORY:  CVA (cerebral vascular accident)      HTN (hypertension)      DM (diabetes mellitus)      HTN (hypertension)      CVA (cerebrovascular accident)      HTN (hypertension)      DM (diabetes mellitus)      HTN (hypertension)      S/P hernia repair  right inguinal hernia 1992      S/P cystoscopy  2012          ROS:    [ ] Unobtainable because:  [ x] All other systems negative    Constitutional: no fever, no chills  Head: no trauma  Eyes: no vision changes, no eye pain  ENT:  no sore throat, no rhinorrhea  Cardiovascular:  no chest pain, no palpitation  Respiratory:  no SOB, no cough  GI:  no abd pain, no vomiting, no diarrhea  urinary: no dysuria, no hematuria, no flank pain  musculoskeletal:  no joint pain, no joint swelling  skin:  no rash  neurology:  no headache, no seizure, no change in mental status  psych: no anxiety, no depression         Allergies  No Known Allergies        ANTIMICROBIALS:  cefepime   IVPB 2000 every 12 hours  cefepime   IVPB        OTHER MEDS:  acetaminophen     Tablet .. 650 milliGRAM(s) Oral every 6 hours PRN  albuterol    0.083% 2.5 milliGRAM(s) Nebulizer every 6 hours PRN  aspirin enteric coated 81 milliGRAM(s) Oral daily  atorvastatin 80 milliGRAM(s) Oral at bedtime  buMETAnide 2 milliGRAM(s) Oral daily  chlorhexidine 2% Cloths 1 Application(s) Topical <User Schedule>  clopidogrel Tablet 75 milliGRAM(s) Oral daily  dextrose Oral Gel 15 Gram(s) Oral once PRN  enoxaparin Injectable 40 milliGRAM(s) SubCutaneous every 24 hours  influenza   Vaccine 0.5 milliLiter(s) IntraMuscular once  insulin glargine Injectable (LANTUS) 35 Unit(s) SubCutaneous at bedtime  insulin lispro (ADMELOG) corrective regimen sliding scale   SubCutaneous at bedtime  insulin lispro (ADMELOG) corrective regimen sliding scale   SubCutaneous three times a day before meals  insulin lispro Injectable (ADMELOG) 20 Unit(s) SubCutaneous three times a day before meals  loperamide 2 milliGRAM(s) Oral daily PRN  metoprolol succinate ER 25 milliGRAM(s) Oral daily  pantoprazole    Tablet 40 milliGRAM(s) Oral before breakfast  tamsulosin 0.4 milliGRAM(s) Oral at bedtime      Vital Signs Last 24 Hrs  T(C): 36.7 (10 Oct 2023 16:52), Max: 37.6 (10 Oct 2023 05:09)  T(F): 98.1 (10 Oct 2023 16:52), Max: 99.7 (10 Oct 2023 05:09)  HR: 98 (10 Oct 2023 16:52) (92 - 116)  BP: 100/63 (10 Oct 2023 16:52) (100/63 - 106/70)  BP(mean): --  RR: 18 (10 Oct 2023 16:52) (18 - 18)  SpO2: 99% (10 Oct 2023 16:52) (95% - 99%)    Parameters below as of 10 Oct 2023 16:52  Patient On (Oxygen Delivery Method): nasal cannula  O2 Flow (L/min): 2      Physical Exam:  General: Nontoxic-appearing Male in no acute distress. NC  HEENT: AT/NC. Anicteric. Conjunctiva pink and moist. Oropharynx clear.  Neck: Not rigid. No sense of mass.  Nodes: None palpable.  Chest: AICD site benign  Lungs: diminished breath sounds b/l at the bases, no wheezes, no rhonchi   Heart: Regular rate and rhythm. No Murmur. No rub. No gallop. No palpable thrill.   Abdomen: Bowel sounds present and normoactive. Soft. Nondistended. Nontender. No sense of mass. No organomegaly.  Back: No spinal tenderness. No costovertebral angle tenderness.   Extremities: No cyanosis or clubbing. No edema.   Skin: Warm. Dry. Good turgor. No rash. No vasculitic stigmata.  Psychiatric: Appropriate affect and mood for situation.     WBC Count: 12.43 K/uL (10-10 @ 08:20)  WBC Count: 10.14 K/uL (10-09 @ 08:52)  WBC Count: 10.67 K/uL (10-07 @ 07:15)  WBC Count: 12.20 K/uL (10-06 @ 07:40)  WBC Count: 11.60 K/uL (10-05 @ 08:20)  WBC Count: 13.08 K/uL (10-04 @ 07:47)                            12.7   12.43 )-----------( 440      ( 10 Oct 2023 08:20 )             39.2       10-10    132<L>  |  101  |  31<H>  ----------------------------<  248<H>  4.0   |  22  |  1.58<H>    Ca    8.4<L>      10 Oct 2023 08:20  Phos  3.7     10-09  Mg     2.3     10-09        Urinalysis Basic - ( 10 Oct 2023 08:20 )    Color: x / Appearance: x / SG: x / pH: x  Gluc: 248 mg/dL / Ketone: x  / Bili: x / Urobili: x   Blood: x / Protein: x / Nitrite: x   Leuk Esterase: x / RBC: x / WBC x   Sq Epi: x / Non Sq Epi: x / Bacteria: x        Creatinine Trend: 1.58<--, 1.56<--, 1.70<--, 1.59<--, 1.62<--, 1.67<--      MICROBIOLOGY:  v  .Stool Feces  10-01-23   No enteric pathogens isolated.  (Stool culture examined for Salmonella,  Shigella, Campylobacter, Aeromonas, Plesiomonas,  Vibrio, E.coli O157 and Yersinia)  --  --      Clean Catch Clean Catch (Midstream)  09-30-23   No growth  --  --      .Blood Blood  09-30-23   No growth at 5 days  --  --      .Blood Blood  09-30-23   No growth at 5 days  --  --      .Sputum Sputum  09-28-23   Rare Pseudomonas aeruginosa  Normal Respiratory Tamela present  --  Pseudomonas aeruginosa      .Blood Blood  09-27-23   No growth at 5 days  --  --      .Blood Blood  09-27-23   No growth at 5 days  --  --      .Blood Blood  09-27-23   No growth at 5 days  --  --      .Blood Blood  09-26-23   No growth at 5 days  --  --      Clean Catch Clean Catch (Midstream)  09-26-23   <10,000 CFU/mL Normal Urogenital Tamela  --  --    C-Reactive Protein, Serum: 29 (10-01)      SARS-CoV-2: NotDetec (28 Sep 2023 12:40)  SARS-CoV-2: NotDetec (26 Sep 2023 00:39)    RADIOLOGY:

## 2023-10-10 NOTE — PROGRESS NOTE ADULT - TIME BILLING
min spent reviewing patient's chart,  examining patient, discussing plan with patient and family and staff, reviewing consultant recommendations/communicating with consultants, writing progress note and placing orders.

## 2023-10-10 NOTE — PROGRESS NOTE ADULT - ASSESSMENT
58M w PMHx  CAD, CVA, DM (suboptimal compliance, checks FS Q4days though a1c <7) PPM/AICD for bradycardia/Mobitz II, LAD- thyroid tissue in retrospect,  now presenting with pneumonia, CHF, NSTEMI, renal insufficiency. Patient treated for CAP, still febrile though at lease at present hyperthermia blanket could have been playing a role.   Seems out of proportion to attribute to rhinovirus/enterovirus  More extensive pneumonia has a more delayed response to therapy  No RF for resistant fredi elicited  Legionella negative  AICD site benign, cx NTD  Exam otherwise unrevealing for acute infectious process.   COVID-negative x2  Blood cx NTD  TSH not suppressed    10/2: pt is feeling better, however, continues having fevers, had a fever of 101.6  at midnight and 101.2 today midday, on NC, WBC without significant change 11.37, Cr is about the same 1.73, BCs and UCs with no growth to date, abx were changed to cefepime and vanco over the weekend, Cefepime IV continued, Vancomycin stopped today.   Pt will need re-imaging to evaluate for the source of pt's fevers.   Attending Addendum--  Case reviewed with CIARRA Wall. Her note reviewed and modified as appropriate.   Still febrile, though trend has been better  CT's as above    10/3: the pt is somewhat forgetful, low grade temps overnight and this morning, on 3L NC and comfortable, WBC elevated, 13.94, Cr about the same as yesterday, yesterday's CT c/a/p - Hazy groundglass opacities in the upper lobes. Mild to moderate bilateral pleural effusions with overlying compressive atelectasis. Differential considerations include edema and/or infection. Unremarkable, unenhanced CT of the abdomen and pelvis.  Cefepime IV continued   Attending Addendum--  Case reviewed with CIARRA Wall. Her note reviewed and modified as appropriate.   Patient personally assessed and examined.  CT's personally reviewed  Perhaps some moderation in fever curve  Continue cefepime- too early to expect complete response to rx vs bronchiectasis/pneumonia    10/4: had elevated temperatures yesterday evening, none overnight, remains on NC, comfortable, WBC is about the same as yesterday, Cr elevated, will continue with Cefepime IV and will monitor for response.   Attending Addendum--  Case reviewed with CIARRA Wall. Her note reviewed and modified as appropriate.   Still with fever but magnitude and frequency have improved.  Continue cefepime for now  follow temp curve    10/5: no fevers since midday yesterday, NC, WBc better 11.6, Cr better 1.67, BCs remain with no growth to date, Cefepime IV continued.   10/6: improving, afebrile, NC, WBC elevated 12.2, Cr is about the same 1.62, all BCs with no growth to date, today is day #7 of Cefepime IV, duration TBD.   Attending Addendum--  Case reviewed with CIARRA Wall. Her note reviewed and modified as appropriate.   As above  Fever curve improved  Cont abx    10/9: low grade temp yesterday evening, NC, no leukocytosis, US renal - no obstruction, with  ml, all BCs with no growth to date, today is day #10 of Cefepime, will continue to monitor.   Attending Addendum--  Case reviewed with CIARRA Wall. Her note reviewed and modified as appropriate.   Patient personally assessed and examined.  Still with intermittent low grade fever  No recent imaging  Repeat CXR?  Role of Abx TBD    10/10: no fevers, NC 2L, WBC elevated 12.43, Cr is about the same 1.58, all BCs with no growth to date, today is day #11 of Cefepime. If remains afebrile, will consider stopping abx vs extending the course to 14 days total. Pending to tertiary facility for cardiac cath.     Suggestions--  cefepime IV day #11   MRSA nares PCR - negative   CT c/a/p - reviewed   follow culture data  trend temperatures and WBC  monitor pt's renal function   HIV test - negative  wean off supplemental O2 as able    discussed with Dr. Arita  discussed with cardiology NP  discussed with the pt and his wife     will discuss with Dr. Cornejo

## 2023-10-10 NOTE — PROGRESS NOTE ADULT - ASSESSMENT
59 yo m pmhx CVA with mild left sided deficits, HTN, DM2, vertigo, HLD, Mobitz II s/pp Medtronic dual chamber ICD (12/21/22) biba from home with complaints of dyspnea and chest pain and was admitted w/ acute hypoxic respiratory failure.  Found to have fever with +enterovirus infection.   Trop peaked at [address]75  Echo: 9/26/23 LVEF 20%. +WMA. Grade 2 DD. Mod reduced RV function   Echo: 12/20/22 Mild mitral regurgitation. Normal left ventricular systolic function.     1) Acute Hypoxic Respiratory Failure 2ndary Acute HFrEF and +entero/rhinovirus  2) NSTEMI   3) New Cardiomyopathy (LVEF 20%)     - patient appears comfortable, no chest pain, SOB  - tele reviewed: paced rhythm   - patient seen at [facility] w/ bradycardia/ Mobitz type 2 AVB w/normal LVEF, plan was for PPM but upgraded to ICD for questionable sarcoidosis, which was then ruled out with PET scan later on   - Echo reviewed +WMA, LVEF 20%  - c/w DAPT, s/p IV heparin x 72 hrs while in ICU  - No overt fluid overload noted, currently continued on Bumex 2mg PO daily  -cont with Toprol XL 25 daily, unable to optimize GDMT with renal insufficiency and low BP  - c/w strict I&O, maintain negative balance, daily weight  - Antibiotics as per primary team/ID, patient having intermittent fevers but improving, when infection resolves and ID clears, will then arrange for transfer for cardiac cath.

## 2023-10-10 NOTE — PHARMACOTHERAPY INTERVENTION NOTE - COMMENTS
Recommended to d/c vancomycin as pt's sputum culture grew pseudomonas. 
Recommended pantoprazole IV to PO switch- patient tolerating PO meds.

## 2023-10-10 NOTE — PROGRESS NOTE ADULT - PROBLEM SELECTOR PLAN 1
Fingersticks are decreasing.  Earlier possibly with infection driven hyperglycemia which is decreasing  Repeat thyroid function test in 1 to 2 days and if still TSH is low and free thyroxine does not come down, he may benefit from low-dose methimazole
repeat thyroid function tests in 1-2 days   no treatment for now
repeat thyroid function tests and treat accordingly   may need Insulin sensitizers added
Check thyroid function test in a few days  Treat accordingly

## 2023-10-10 NOTE — PROGRESS NOTE ADULT - ASSESSMENT
57 yo m pmhx CVA with mild left sided deficits, HTN, DM2, vertigo, HLD, Mobitz II s/pp Medtronic dual chamber ICD (12/21/22) biba from home with complaints of dyspnea and chest pain and was admitted w/ acute hypoxic respiratory failure likely due to acute pulmonary edema due to acute HFrEF in setting of acute NSTEMI, LAURA and enterovirus infection.     Acute hypoxic respiratory failure due to acute sys/ diastolic CHF  - CT on admission showed patchy and confluent ground-glass and consolidative opacities primarily in the bilateral upper and lower lobes c/w a combination of infection and edema and small bilateral pleural effusions  - patient has improved w/ Lasix and Bipap, now on NC  - Echo showed EF 20% w/ severely decreased global left ventricular systolic function w/ multiple left ventricular regional wall motion abnormalities & grade II diastolic dysfunction.   - c/w Bumex &   metoprolol     NSTEMI  - trop peaked at 24,293  - as per cardio, will likely need transfer for cardiac catheterization, once infectious symptoms resolve  - c/w ASA, Plavix & Lipitor  - heparin gtt stopped in ICU     Gram Negative Community Acquired Pneumonia   - likely due to PNA as CT showed hazy groundglass opacities in the upper lobes w/ mild to moderate bilateral pleural effusions with overlying compressive atelectasis  - s/p Ceftriaxone in ICU    - as per ID, stopped vanc stopped  - Sputum + pseudomonas   - BCx NGTD  - c/w Cefepime for total of 14 days, spoke with ID, may be able to be transferred as long as he finishes 10 day course  - wean off NC as tolerated    Urinary retention   - Douglass placed 9/30 after multiple straight caths  - urology following   - passed TOV 10/6 , good urine output    LAURA  - ATN vs cardiorenal  - Cr now improving      HTN  - continue with current regimen     DM2   - c/w Lantus, mealtime lispro and ISS,    - hgb A1C is 6.9  - endo following    Thyroid goiter   - CT showed goiter,  3.3 x 2.8 cm lesion in the  superior mediastinum compatible with substernal extension of thyroid  goiter, unchanged compared to 12/27/2022  - TSH is 0.258 but free T4 is 2, repeat tsh wnl  - will need outpatient follow-up     Preventative Measures   DVT: Lovenox SQ  GI: Protonix   fall precautions     Dispo: will need transfer to [facility] for cardiac cath   PT: REJI

## 2023-10-10 NOTE — PROGRESS NOTE ADULT - SUBJECTIVE AND OBJECTIVE BOX
Patient is a 58y old  Male who presents with a chief complaint of CHF Exacerbation (10 Oct 2023 17:10)      INTERVAL HPI/OVERNIGHT EVENTS: Overnight, no acute events. Sugars were high ysterday but insulin has been adjusted, and sugars slightly improved today. Resting comfortably. Afebrile overnight.     MEDICATIONS  (STANDING):  aspirin enteric coated 81 milliGRAM(s) Oral daily  atorvastatin 80 milliGRAM(s) Oral at bedtime  buMETAnide 2 milliGRAM(s) Oral daily  cefepime   IVPB 2000 milliGRAM(s) IV Intermittent every 12 hours  cefepime   IVPB      chlorhexidine 2% Cloths 1 Application(s) Topical <User Schedule>  clopidogrel Tablet 75 milliGRAM(s) Oral daily  enoxaparin Injectable 40 milliGRAM(s) SubCutaneous every 24 hours  influenza   Vaccine 0.5 milliLiter(s) IntraMuscular once  insulin glargine Injectable (LANTUS) 35 Unit(s) SubCutaneous at bedtime  insulin lispro (ADMELOG) corrective regimen sliding scale   SubCutaneous three times a day before meals  insulin lispro (ADMELOG) corrective regimen sliding scale   SubCutaneous at bedtime  insulin lispro Injectable (ADMELOG) 20 Unit(s) SubCutaneous three times a day before meals  metoprolol succinate ER 25 milliGRAM(s) Oral daily  pantoprazole    Tablet 40 milliGRAM(s) Oral before breakfast  tamsulosin 0.4 milliGRAM(s) Oral at bedtime    MEDICATIONS  (PRN):  acetaminophen     Tablet .. 650 milliGRAM(s) Oral every 6 hours PRN Temp greater or equal to 38C (100.4F), Mild Pain (1 - 3)  albuterol    0.083% 2.5 milliGRAM(s) Nebulizer every 6 hours PRN Shortness of Breath and/or Wheezing  dextrose Oral Gel 15 Gram(s) Oral once PRN Blood Glucose LESS THAN 70 milliGRAM(s)/deciliter  loperamide 2 milliGRAM(s) Oral daily PRN if >3 watery BMs per day      Allergies    No Known Allergies    Intolerances        REVIEW OF SYSTEMS:  10 point ROS negative, unless stated otherwise.      Vital Signs Last 24 Hrs  T(C): 36.7 (10 Oct 2023 16:52), Max: 37.6 (10 Oct 2023 05:09)  T(F): 98.1 (10 Oct 2023 16:52), Max: 99.7 (10 Oct 2023 05:09)  HR: 98 (10 Oct 2023 16:52) (92 - 116)  BP: 100/63 (10 Oct 2023 16:52) (100/63 - 102/64)  BP(mean): --  RR: 18 (10 Oct 2023 16:52) (18 - 18)  SpO2: 99% (10 Oct 2023 16:52) (95% - 99%)    Parameters below as of 10 Oct 2023 16:52  Patient On (Oxygen Delivery Method): nasal cannula  O2 Flow (L/min): 2      PHYSICAL EXAM:  General: Nontoxic-appearing Male in no acute distress. NC  HEENT: AT/NC. Anicteric. Conjunctiva pink and moist. Oropharynx clear.  Neck: Not rigid. No sense of mass.  Nodes: None palpable.  Chest: AICD site benign  Lungs: diminished breath sounds b/l at the bases, no wheezes, no rhonchi   Heart: Regular rate and rhythm. No Murmur. No rub. No gallop. No palpable thrill.   Abdomen: Bowel sounds present and normoactive. Soft. Nondistended. Nontender. No sense of mass. No organomegaly.  Back: No spinal tenderness. No costovertebral angle tenderness.   Extremities: No cyanosis or clubbing. No edema.   Skin: Warm. Dry. Good turgor. No rash. No vasculitic stigmata.  Psychiatric: Appropriate affect and mood for situation.     LABS:                        12.7   12.43 )-----------( 440      ( 10 Oct 2023 08:20 )             39.2     10-10    132<L>  |  101  |  31<H>  ----------------------------<  248<H>  4.0   |  22  |  1.58<H>    Ca    8.4<L>      10 Oct 2023 08:20  Phos  3.7     10-09  Mg     2.3     10-09        Urinalysis Basic - ( 10 Oct 2023 08:20 )    Color: x / Appearance: x / SG: x / pH: x  Gluc: 248 mg/dL / Ketone: x  / Bili: x / Urobili: x   Blood: x / Protein: x / Nitrite: x   Leuk Esterase: x / RBC: x / WBC x   Sq Epi: x / Non Sq Epi: x / Bacteria: x      CAPILLARY BLOOD GLUCOSE      POCT Blood Glucose.: 219 mg/dL (10 Oct 2023 16:38)  POCT Blood Glucose.: 248 mg/dL (10 Oct 2023 11:05)  POCT Blood Glucose.: 238 mg/dL (10 Oct 2023 08:31)  POCT Blood Glucose.: 277 mg/dL (09 Oct 2023 22:10)  POCT Blood Glucose.: 293 mg/dL (09 Oct 2023 21:02)  POCT Blood Glucose.: 403 mg/dL (09 Oct 2023 18:48)  POCT Blood Glucose.: 422 mg/dL (09 Oct 2023 18:07)      RADIOLOGY & ADDITIONAL TESTS:    Imaging Personally Reviewed:  [ X] YES  [ ] NO    Consultant(s) Notes Reviewed:  [ X] YES  [ ] NO    Care Discussed with Consultants/Other Providers [X ] YES  [ ] NO

## 2023-10-10 NOTE — PROGRESS NOTE ADULT - SUBJECTIVE AND OBJECTIVE BOX
Patient is a 58y old  Male who presents with a chief complaint of sob/chest pain.    PAST MEDICAL & SURGICAL HISTORY:  CVA (cerebral vascular accident)    HTN (hypertension)    DM (diabetes mellitus)    S/P hernia repair  right inguinal hernia 1992    Mobitz type II AV block.    s/p AICD    PAST MEDICAL & SURGICAL HISTORY:    INTERVAL HISTORY:  	  MEDICATIONS:  MEDICATIONS  (STANDING):  aspirin enteric coated 81 milliGRAM(s) Oral daily  atorvastatin 80 milliGRAM(s) Oral at bedtime  buMETAnide 2 milliGRAM(s) Oral daily  cefepime   IVPB 2000 milliGRAM(s) IV Intermittent every 12 hours  chlorhexidine 2% Cloths 1 Application(s) Topical <User Schedule>  clopidogrel Tablet 75 milliGRAM(s) Oral daily  enoxaparin Injectable 40 milliGRAM(s) SubCutaneous every 24 hours  influenza   Vaccine 0.5 milliLiter(s) IntraMuscular once  insulin glargine Injectable (LANTUS) 30 Unit(s) SubCutaneous at bedtime  insulin lispro (ADMELOG) corrective regimen sliding scale   SubCutaneous at bedtime  insulin lispro (ADMELOG) corrective regimen sliding scale   SubCutaneous three times a day before meals  insulin lispro Injectable (ADMELOG) 18 Unit(s) SubCutaneous three times a day before meals  metoprolol succinate ER 12.5 milliGRAM(s) Oral two times a day  pantoprazole  Injectable 40 milliGRAM(s) IV Push two times a day  tamsulosin 0.4 milliGRAM(s) Oral at bedtime    MEDICATIONS  (PRN):  acetaminophen     Tablet .. 650 milliGRAM(s) Oral every 6 hours PRN Temp greater or equal to 38C (100.4F), Mild Pain (1 - 3)  albuterol    0.083% 2.5 milliGRAM(s) Nebulizer every 6 hours PRN Shortness of Breath and/or Wheezing  dextrose Oral Gel 15 Gram(s) Oral once PRN Blood Glucose LESS THAN 70 milliGRAM(s)/deciliter  loperamide 2 milliGRAM(s) Oral daily PRN if >3 watery BMs per day    Vitals:  T(F): 99.7 (10-10-23 @ 05:09), Max: 99.7 (10-10-23 @ 05:09)  HR: 110 (10-10-23 @ 05:09) (98 - 116)  BP: 100/69 (10-10-23 @ 05:09) (100/69 - 112/73)  RR: 18 (10-10-23 @ 05:09) (18 - 18)  SpO2: 96% (10-10-23 @ 05:09) (95% - 100%)    10-09 @ 07:01  -  10-10 @ 07:00  --------------------------------------------------------  IN:    Oral Fluid: 200 mL  Total IN: 200 mL    OUT:    Voided (mL): 2 mL  Total OUT: 2 mL    Total NET: 198 mL    10-10 @ 07:01  -  10-10 @ 09:45  --------------------------------------------------------  IN:  Total IN: 0 mL    OUT:    Stool (mL): 2 mL  Total OUT: 2 mL    Total NET: -2 mL    PHYSICAL EXAM:  Neuro: Awake, responsive  CV: S1 S2 RRR  Lungs: CTABL  GI: Soft, BS +, ND, NT  Extremities: No edema    TELEMETRY: paced    RADIOLOGY:   < from: Xray Chest 2 Views PA/Lat (10.02.23 @ 19:35) >  IMPRESSION:   Moderate bilateral pleural effusions and/or posterior   basilar airspace consolidations obscuring LEFT diaphragm contour.    < end of copied text >    DIAGNOSTIC TESTING:    [x ] Echocardiogram: < from: TTE Echo Complete w/o Contrast w/ Doppler (09.26.23 @ 13:20) >  Left Ventricle: The left ventricular internal cavity size is normal.  Global LV systolic function was severely decreased. Left ventricular   ejection fraction, by visual estimation, is 20%. Spectral Doppler shows   pseudonormal pattern of left ventricular myocardial filling (Grade II   diastolic dysfunction). Elevated left ventricular end-diastolic pressure.   Global longitudinal strain using MOOI software at a HR of 121bpm and BP   137/99 is -5.1%, which is severely reduced. There is some sparing of the   basal segments.    LV Wall Scoring:  The entire apex is akinetic. The mid anteroseptal segment, mid   inferoseptal  segment, mid inferolateral segment, mid anterolateral segment, mid   anterior  segment, and mid inferior segment are hypokinetic. All remaining scored  segments are normal.    Right Ventricle: RV systolic function is moderately reduced.  Left Atrium: The left atrium is normal in size.  Right Atrium: The right atrium was not well visualized.  Pericardium: Trivial pericardial effusion is present.  Mitral Valve: Structurally normal mitral valve, with normal leaflet   excursion. There is mild mitral annular calcification.  Tricuspid Valve: Mild tricuspid regurgitation is visualized.    Summary:   1. Technically limited study.   2. Left ventricular ejection fraction, by visual estimation, is 20%.   3. Severely decreased global left ventricular systolic function.   4. Multiple left ventricular regional wall motion abnormalities exist.   See wall motion findings.   5. Elevated left ventricular end-diastolic pressure.   6. Spectral Doppler shows pseudonormal pattern of left ventricular   myocardial filling (Grade II diastolic dysfunction).   7. Global longitudinal strain using MOOI software at a HR of 121bpm and BP   137/99 is -5.1%, which is severely reduced. There is some sparing of the   basal segments.   8. Moderately reduced RVsystolic function.   9. The left atrium is normal in size.  10. Trivial pericardial effusion.  11. Mild tricuspid regurgitation.    < end of copied text >    LABS:	 	    10 Oct 2023 08:20    132    |  101    |  31     ----------------------------<  248    4.0     |  22     |  1.58   09 Oct 2023 08:52    136    |  105    |  29     ----------------------------<  193    3.6     |  23     |  1.56   08 Oct 2023 07:00    134    |  102    |  29     ----------------------------<  214    3.8     |  25     |  1.70     Ca    8.4        10 Oct 2023 08:20  Phos  3.7       09 Oct 2023 08:52  Mg     2.3       09 Oct 2023 08:52                        12.7   12.43 )-----------( 440      ( 10 Oct 2023 08:20 )             39.2 ,                       11.7   10.14 )-----------( 408      ( 09 Oct 2023 08:52 )             36.6              Patient is a 58y old  Male who presents with a chief complaint of sob/chest pain.    PAST MEDICAL & SURGICAL HISTORY:  CVA (cerebral vascular accident)    HTN (hypertension)    DM (diabetes mellitus)    S/P hernia repair  right inguinal hernia 1992    Mobitz type II AV block.    s/p AICD    PAST MEDICAL & SURGICAL HISTORY:    INTERVAL HISTORY: in no distress, denies any chest pain or sob   	  MEDICATIONS:  MEDICATIONS  (STANDING):  aspirin enteric coated 81 milliGRAM(s) Oral daily  atorvastatin 80 milliGRAM(s) Oral at bedtime  buMETAnide 2 milliGRAM(s) Oral daily  cefepime   IVPB 2000 milliGRAM(s) IV Intermittent every 12 hours  chlorhexidine 2% Cloths 1 Application(s) Topical <User Schedule>  clopidogrel Tablet 75 milliGRAM(s) Oral daily  enoxaparin Injectable 40 milliGRAM(s) SubCutaneous every 24 hours  influenza   Vaccine 0.5 milliLiter(s) IntraMuscular once  insulin glargine Injectable (LANTUS) 30 Unit(s) SubCutaneous at bedtime  insulin lispro (ADMELOG) corrective regimen sliding scale   SubCutaneous at bedtime  insulin lispro (ADMELOG) corrective regimen sliding scale   SubCutaneous three times a day before meals  insulin lispro Injectable (ADMELOG) 18 Unit(s) SubCutaneous three times a day before meals  metoprolol succinate ER 25 mg daily   pantoprazole  Injectable 40 milliGRAM(s) IV Push two times a day  tamsulosin 0.4 milliGRAM(s) Oral at bedtime    MEDICATIONS  (PRN):  acetaminophen     Tablet .. 650 milliGRAM(s) Oral every 6 hours PRN Temp greater or equal to 38C (100.4F), Mild Pain (1 - 3)  albuterol    0.083% 2.5 milliGRAM(s) Nebulizer every 6 hours PRN Shortness of Breath and/or Wheezing  dextrose Oral Gel 15 Gram(s) Oral once PRN Blood Glucose LESS THAN 70 milliGRAM(s)/deciliter  loperamide 2 milliGRAM(s) Oral daily PRN if >3 watery BMs per day    Vitals:  T(F): 99.7 (10-10-23 @ 05:09), Max: 99.7 (10-10-23 @ 05:09)  HR: 110 (10-10-23 @ 05:09) (98 - 116)  BP: 100/69 (10-10-23 @ 05:09) (100/69 - 112/73)  RR: 18 (10-10-23 @ 05:09) (18 - 18)  SpO2: 96% (10-10-23 @ 05:09) (95% - 100%)    10-09 @ 07:01  -  10-10 @ 07:00  --------------------------------------------------------  IN:    Oral Fluid: 200 mL  Total IN: 200 mL    OUT:    Voided (mL): 2 mL  Total OUT: 2 mL    Total NET: 198 mL    10-10 @ 07:01  -  10-10 @ 09:45  --------------------------------------------------------  IN:  Total IN: 0 mL    OUT:    Stool (mL): 2 mL  Total OUT: 2 mL    Total NET: -2 mL    PHYSICAL EXAM:  Neuro: Awake, responsive  CV: S1 S2 RRR  Lungs: CTABL  GI: Soft, BS +, ND, NT  Extremities: No edema    TELEMETRY: paced    RADIOLOGY:   < from: Xray Chest 2 Views PA/Lat (10.02.23 @ 19:35) >  IMPRESSION:   Moderate bilateral pleural effusions and/or posterior   basilar airspace consolidations obscuring LEFT diaphragm contour.    < end of copied text >    DIAGNOSTIC TESTING:    [x ] Echocardiogram: < from: TTE Echo Complete w/o Contrast w/ Doppler (09.26.23 @ 13:20) >  Left Ventricle: The left ventricular internal cavity size is normal.  Global LV systolic function was severely decreased. Left ventricular   ejection fraction, by visual estimation, is 20%. Spectral Doppler shows   pseudonormal pattern of left ventricular myocardial filling (Grade II   diastolic dysfunction). Elevated left ventricular end-diastolic pressure.   Global longitudinal strain using MedCenterDisplay software at a HR of 121bpm and BP   137/99 is -5.1%, which is severely reduced. There is some sparing of the   basal segments.    LV Wall Scoring:  The entire apex is akinetic. The mid anteroseptal segment, mid   inferoseptal  segment, mid inferolateral segment, mid anterolateral segment, mid   anterior  segment, and mid inferior segment are hypokinetic. All remaining scored  segments are normal.    Right Ventricle: RV systolic function is moderately reduced.  Left Atrium: The left atrium is normal in size.  Right Atrium: The right atrium was not well visualized.  Pericardium: Trivial pericardial effusion is present.  Mitral Valve: Structurally normal mitral valve, with normal leaflet   excursion. There is mild mitral annular calcification.  Tricuspid Valve: Mild tricuspid regurgitation is visualized.    Summary:   1. Technically limited study.   2. Left ventricular ejection fraction, by visual estimation, is 20%.   3. Severely decreased global left ventricular systolic function.   4. Multiple left ventricular regional wall motion abnormalities exist.   See wall motion findings.   5. Elevated left ventricular end-diastolic pressure.   6. Spectral Doppler shows pseudonormal pattern of left ventricular   myocardial filling (Grade II diastolic dysfunction).   7. Global longitudinal strain using MedCenterDisplay software at a HR of 121bpm and BP   137/99 is -5.1%, which is severely reduced. There is some sparing of the   basal segments.   8. Moderately reduced RV systolic function.   9. The left atrium is normal in size.  10. Trivial pericardial effusion.  11. Mild tricuspid regurgitation.    < end of copied text >    LABS:	 	    10 Oct 2023 08:20    132    |  101    |  31     ----------------------------<  248    4.0     |  22     |  1.58   09 Oct 2023 08:52    136    |  105    |  29     ----------------------------<  193    3.6     |  23     |  1.56   08 Oct 2023 07:00    134    |  102    |  29     ----------------------------<  214    3.8     |  25     |  1.70     Ca    8.4        10 Oct 2023 08:20  Phos  3.7       09 Oct 2023 08:52  Mg     2.3       09 Oct 2023 08:52                        12.7   12.43 )-----------( 440      ( 10 Oct 2023 08:20 )             39.2 ,                       11.7   10.14 )-----------( 408      ( 09 Oct 2023 08:52 )             36.6

## 2023-10-10 NOTE — PROGRESS NOTE ADULT - PROBLEM SELECTOR PROBLEM 1
Abnormal thyroid function test

## 2023-10-11 ENCOUNTER — TRANSCRIPTION ENCOUNTER (OUTPATIENT)
Age: 58
End: 2023-10-11

## 2023-10-11 LAB
ANION GAP SERPL CALC-SCNC: 6 MMOL/L — SIGNIFICANT CHANGE UP (ref 5–17)
BUN SERPL-MCNC: 30 MG/DL — HIGH (ref 7–23)
CALCIUM SERPL-MCNC: 8.4 MG/DL — LOW (ref 8.5–10.1)
CHLORIDE SERPL-SCNC: 104 MMOL/L — SIGNIFICANT CHANGE UP (ref 96–108)
CO2 SERPL-SCNC: 25 MMOL/L — SIGNIFICANT CHANGE UP (ref 22–31)
CREAT SERPL-MCNC: 1.62 MG/DL — HIGH (ref 0.5–1.3)
EGFR: 50 ML/MIN/1.73M2 — LOW
GLUCOSE BLDC GLUCOMTR-MCNC: 208 MG/DL — HIGH (ref 70–99)
GLUCOSE BLDC GLUCOMTR-MCNC: 209 MG/DL — HIGH (ref 70–99)
GLUCOSE BLDC GLUCOMTR-MCNC: 217 MG/DL — HIGH (ref 70–99)
GLUCOSE BLDC GLUCOMTR-MCNC: 396 MG/DL — HIGH (ref 70–99)
GLUCOSE SERPL-MCNC: 217 MG/DL — HIGH (ref 70–99)
HCT VFR BLD CALC: 38.6 % — LOW (ref 39–50)
HGB BLD-MCNC: 11.9 G/DL — LOW (ref 13–17)
MCHC RBC-ENTMCNC: 24.9 PG — LOW (ref 27–34)
MCHC RBC-ENTMCNC: 30.8 G/DL — LOW (ref 32–36)
MCV RBC AUTO: 80.8 FL — SIGNIFICANT CHANGE UP (ref 80–100)
NRBC # BLD: 0 /100 WBCS — SIGNIFICANT CHANGE UP (ref 0–0)
PLATELET # BLD AUTO: 437 K/UL — HIGH (ref 150–400)
POTASSIUM SERPL-MCNC: 3.5 MMOL/L — SIGNIFICANT CHANGE UP (ref 3.5–5.3)
POTASSIUM SERPL-SCNC: 3.5 MMOL/L — SIGNIFICANT CHANGE UP (ref 3.5–5.3)
RBC # BLD: 4.78 M/UL — SIGNIFICANT CHANGE UP (ref 4.2–5.8)
RBC # FLD: 14.1 % — SIGNIFICANT CHANGE UP (ref 10.3–14.5)
SODIUM SERPL-SCNC: 135 MMOL/L — SIGNIFICANT CHANGE UP (ref 135–145)
WBC # BLD: 10.78 K/UL — HIGH (ref 3.8–10.5)
WBC # FLD AUTO: 10.78 K/UL — HIGH (ref 3.8–10.5)

## 2023-10-11 PROCEDURE — 99233 SBSQ HOSP IP/OBS HIGH 50: CPT

## 2023-10-11 PROCEDURE — 99232 SBSQ HOSP IP/OBS MODERATE 35: CPT

## 2023-10-11 RX ORDER — PANTOPRAZOLE SODIUM 20 MG/1
1 TABLET, DELAYED RELEASE ORAL
Qty: 0 | Refills: 0 | DISCHARGE
Start: 2023-10-11

## 2023-10-11 RX ORDER — TAMSULOSIN HYDROCHLORIDE 0.4 MG/1
1 CAPSULE ORAL
Qty: 0 | Refills: 0 | DISCHARGE
Start: 2023-10-11

## 2023-10-11 RX ORDER — ASPIRIN/CALCIUM CARB/MAGNESIUM 324 MG
1 TABLET ORAL
Qty: 0 | Refills: 0 | DISCHARGE
Start: 2023-10-11

## 2023-10-11 RX ORDER — METOPROLOL TARTRATE 50 MG
1 TABLET ORAL
Qty: 0 | Refills: 0 | DISCHARGE
Start: 2023-10-11

## 2023-10-11 RX ORDER — BUMETANIDE 0.25 MG/ML
1 INJECTION INTRAMUSCULAR; INTRAVENOUS
Qty: 0 | Refills: 0 | DISCHARGE
Start: 2023-10-11

## 2023-10-11 RX ORDER — ATORVASTATIN CALCIUM 80 MG/1
1 TABLET, FILM COATED ORAL
Qty: 0 | Refills: 0 | DISCHARGE
Start: 2023-10-11

## 2023-10-11 RX ORDER — LOPERAMIDE HCL 2 MG
1 TABLET ORAL
Qty: 0 | Refills: 0 | DISCHARGE
Start: 2023-10-11

## 2023-10-11 RX ORDER — CLOPIDOGREL BISULFATE 75 MG/1
1 TABLET, FILM COATED ORAL
Qty: 0 | Refills: 0 | DISCHARGE
Start: 2023-10-11

## 2023-10-11 RX ADMIN — CLOPIDOGREL BISULFATE 75 MILLIGRAM(S): 75 TABLET, FILM COATED ORAL at 12:25

## 2023-10-11 RX ADMIN — TAMSULOSIN HYDROCHLORIDE 0.4 MILLIGRAM(S): 0.4 CAPSULE ORAL at 21:22

## 2023-10-11 RX ADMIN — CEFEPIME 100 MILLIGRAM(S): 1 INJECTION, POWDER, FOR SOLUTION INTRAMUSCULAR; INTRAVENOUS at 17:05

## 2023-10-11 RX ADMIN — Medication 20 UNIT(S): at 08:02

## 2023-10-11 RX ADMIN — INSULIN GLARGINE 35 UNIT(S): 100 INJECTION, SOLUTION SUBCUTANEOUS at 22:43

## 2023-10-11 RX ADMIN — Medication 4: at 12:16

## 2023-10-11 RX ADMIN — Medication 81 MILLIGRAM(S): at 12:25

## 2023-10-11 RX ADMIN — CHLORHEXIDINE GLUCONATE 1 APPLICATION(S): 213 SOLUTION TOPICAL at 05:53

## 2023-10-11 RX ADMIN — Medication 25 MILLIGRAM(S): at 05:51

## 2023-10-11 RX ADMIN — Medication 4: at 08:02

## 2023-10-11 RX ADMIN — BUMETANIDE 2 MILLIGRAM(S): 0.25 INJECTION INTRAMUSCULAR; INTRAVENOUS at 05:51

## 2023-10-11 RX ADMIN — ATORVASTATIN CALCIUM 80 MILLIGRAM(S): 80 TABLET, FILM COATED ORAL at 21:22

## 2023-10-11 RX ADMIN — Medication 20 UNIT(S): at 12:16

## 2023-10-11 RX ADMIN — ENOXAPARIN SODIUM 40 MILLIGRAM(S): 100 INJECTION SUBCUTANEOUS at 05:51

## 2023-10-11 RX ADMIN — CEFEPIME 100 MILLIGRAM(S): 1 INJECTION, POWDER, FOR SOLUTION INTRAMUSCULAR; INTRAVENOUS at 05:50

## 2023-10-11 RX ADMIN — Medication 10: at 17:06

## 2023-10-11 RX ADMIN — PANTOPRAZOLE SODIUM 40 MILLIGRAM(S): 20 TABLET, DELAYED RELEASE ORAL at 05:53

## 2023-10-11 RX ADMIN — Medication 20 UNIT(S): at 17:35

## 2023-10-11 NOTE — DISCHARGE NOTE PROVIDER - HOSPITAL COURSE
HPI:  59 yo m pmhx CVA with mild left sided deficits, HTN, DM2, vertigo, HLD, Mobitz II s/pp Medtronic dual chamber ICD (12/21/22) biba from home with complaints of sob/chest pain.  Per patient's wife cp began yesterday however persistent today and with associated worsening sob.  In ED patient tachypneic, tachycardic.  Labs significant for Trop 6791.6, Cr 1.42, bnp and lactate 2.9.  Patient given 2L IVF, lactate worsening, rapidly worsening sob and hypoxia.  Patient seen at bedside, on NRB with spo2 88-92% tachypenic, appears uncomfortable.  Coarse b/l.  Bedside POCUS exam performed, diffuse b lines, ivf plump, difficultly visualizing chambers however overall function appears decreased based off his previous tte.  Patient ordered for Bipap and lasix 80mg.  Stat TTE ordered.  Admit to ICU.       (26 Sep 2023 06:34)  Downgrade to telemetry.   Acute hypoxic respiratory failure due to acute sys/ diastolic CHF  - CT on admission showed patchy and confluent ground-glass and consolidative opacities primarily in the bilateral upper and lower lobes c/w a combination of infection and edema and small bilateral pleural effusions  - patient has improved w/ Lasix and Bipap, now on NC  - Echo showed EF 20% w/ severely decreased global left ventricular systolic function w/ multiple left ventricular regional wall motion abnormalities & grade II diastolic dysfunction.   - c/w Bumex &   metoprolol     NSTEMI  - trop peaked at 24,293  - as per cardio, will likely need transfer for cardiac catheterization, once infectious symptoms resolve  - c/w ASA, Plavix & Lipitor  - heparin gtt was stopped in ICU     Gram Negative Community Acquired Pneumonia   - likely due to PNA as CT showed hazy groundglass opacities in the upper lobes w/ mild to moderate bilateral pleural effusions with overlying compressive atelectasis  - s/p Ceftriaxone in ICU    - as per ID, stopped vanc stopped  - Sputum + pseudomonas   - BCx NGTD  - c/w Cefepime for total of 14 days,  needs 3 more days   -try to n wean off NC as tolerated    Urinary retention   - Douglass placed 9/30 after multiple straight caths  - urology following   -per my colleague's documentation " passed TOV 10/6 , good urine output"    LAURA-    on ckd stage 2  - ATN vs cardiorenal  - Cr slighly better       HTN   bp stable  DM2   - c/w Lantus, mealtime lispro and ISS,    - hgb A1C is 6.9  - endo following    Thyroid goiter   -per my colleague's documentation" CT showed goiter,  3.3 x 2.8 cm lesion in the  superior mediastinum compatible with substernal extension of thyroid  goiter, unchanged compared to 12/27/2022  - TSH is 0.258 but free T4 is 2, repeat tsh wnl  - will need outpatient follow-up "    For transfer to [facility] for cardiac cath case d/w medicine attending dr. dale hatch who is accepting    HPI:  59 yo m pmhx CVA with mild left sided deficits, HTN, DM2, vertigo, HLD, Mobitz II s/pp Medtronic dual chamber ICD (12/21/22) biba from home with complaints of sob/chest pain.  Per patient's wife cp began yesterday however persistent today and with associated worsening sob.  In ED patient tachypneic, tachycardic.  Labs significant for Trop 6791.6, Cr 1.42, bnp and lactate 2.9.  Patient given 2L IVF, lactate worsening, rapidly worsening sob and hypoxia.  Patient seen at bedside, on NRB with spo2 88-92% tachypenic, appears uncomfortable.  Coarse b/l.  Bedside POCUS exam performed, diffuse b lines, ivf plump, difficultly visualizing chambers however overall function appears decreased based off his previous tte.  Patient ordered for Bipap and lasix 80mg.  Stat TTE ordered.  Admit to ICU.       (26 Sep 2023 06:34)  Pt downgraded to telemetry, improved with lasix and bipap, heparin drip while in ICU, seen by cardiology, received IV antibiotics for pneumonia. Cleared by ID for transfer to [facility] for cardiac catheterixation.  1.Acute hypoxic respiratory failure   2. Acute systolic/ Diastolic CHF exacerbation  3. Cardiomyapathy  4.NSTEMI  5. Gram Negative Community Acquired Pneumonia   6. LAURA on CKD    For transfer to [facility] for cardiac cath, dr. dale hatch accepting physician    HPI:  59 yo m pmhx CVA with mild left sided deficits, HTN, DM2, vertigo, HLD, Mobitz II s/pp Medtronic dual chamber ICD (12/21/22) biba from home with complaints of sob/chest pain.  Per patient's wife cp began yesterday however persistent today and with associated worsening sob.  In ED patient tachypneic, tachycardic.  Labs significant for Trop 6791.6, Cr 1.42, bnp and lactate 2.9.  Patient given 2L IVF, lactate worsening, rapidly worsening sob and hypoxia.  Patient seen at bedside, on NRB with spo2 88-92% tachypenic, appears uncomfortable.  Coarse b/l.  Bedside POCUS exam performed, diffuse b lines, ivf plump, difficultly visualizing chambers however overall function appears decreased based off his previous tte.  Patient ordered for Bipap and lasix 80mg.  Stat TTE ordered.  Admit to ICU.       (26 Sep 2023 06:34)  Pt downgraded to telemetry, improved with lasix and bipap, heparin drip while in ICU, seen by cardiology, received IV antibiotics for pneumonia. Cleared by ID for transfer to [facility] for cardiac catheterixation.  1.Acute hypoxic respiratory failure   2. Acute systolic/ Diastolic CHF exacerbation  3. Ischemic Cardiomyapathy  4.NSTEMI  5. severe sepsis, Pneumonia   6. LAURA secondary to ATN  7. type two DM, uncontrolled Hyperglycemia    For transfer to [facility] for cardiac cath, dr. dale hatch accepting physician

## 2023-10-11 NOTE — PATIENT PROFILE ADULT - DEAF OR HARD OF HEARING?
Patient cleared for discharge by Yancy Jimenez SW. AVS/Discharge instructions printed and reviewed with patient by nursing staff. Patient verbalizes understanding of instructions. PIV removed, catheter tip intact. Pressure dressing place.  Patient received home medications to bedside by pharmacy.   Patient escorted off unit by writer via wheelchair. Patient son-in-law here to transport home per nursing staff.   no

## 2023-10-11 NOTE — PROGRESS NOTE ADULT - ASSESSMENT
57 yo m pmhx CVA with mild left sided deficits, HTN, DM2, vertigo, HLD, Mobitz II s/pp Medtronic dual chamber ICD (12/21/22) biba from home with complaints of dyspnea and chest pain and was admitted w/ acute hypoxic respiratory failure likely due to acute pulmonary edema due to acute HFrEF in setting of acute NSTEMI, LAURA and enterovirus infection.     Acute hypoxic respiratory failure due to acute sys/ diastolic CHF  - CT on admission showed patchy and confluent ground-glass and consolidative opacities primarily in the bilateral upper and lower lobes c/w a combination of infection and edema and small bilateral pleural effusions  - patient has improved w/ Lasix and Bipap, now on NC  - Echo showed EF 20% w/ severely decreased global left ventricular systolic function w/ multiple left ventricular regional wall motion abnormalities & grade II diastolic dysfunction.   - c/w Bumex &   metoprolol     NSTEMI  - trop peaked at 24,293  - as per cardio, will likely need transfer for cardiac catheterization, once infectious symptoms resolve  - c/w ASA, Plavix & Lipitor  - heparin gtt was stopped in ICU     Gram Negative Community Acquired Pneumonia   - likely due to PNA as CT showed hazy groundglass opacities in the upper lobes w/ mild to moderate bilateral pleural effusions with overlying compressive atelectasis  - s/p Ceftriaxone in ICU    - as per ID, stopped vanc stopped  - Sputum + pseudomonas   - BCx NGTD  - c/w Cefepime for total of 14 days,  needs 3 more days   -try to n wean off NC as tolerated    Urinary retention   - Douglass placed 9/30 after multiple straight caths  - urology following   -per my colleague's documentation " passed TOV 10/6 , good urine output"    LAURA-    on ckd stage 2  - ATN vs cardiorenal  - Cr slighly better       HTN   bp stable  DM2   - c/w Lantus, mealtime lispro and ISS,    - hgb A1C is 6.9  - endo following    Thyroid goiter   -per my colleague's documentation" CT showed goiter,  3.3 x 2.8 cm lesion in the  superior mediastinum compatible with substernal extension of thyroid  goiter, unchanged compared to 12/27/2022  - TSH is 0.258 but free T4 is 2, repeat tsh wnl  - will need outpatient follow-up "    Dispo: will need transfer to [facility] for cardiac cath   PT: REJI  59 yo m pmhx CVA with mild left sided deficits, HTN, DM2, vertigo, HLD, Mobitz II s/pp Medtronic dual chamber ICD (12/21/22) biba from home with complaints of dyspnea and chest pain and was admitted w/ acute hypoxic respiratory failure likely due to acute pulmonary edema due to acute HFrEF in setting of acute NSTEMI, LAURA and enterovirus infection.     Acute hypoxic respiratory failure due to acute sys/ diastolic CHF  - CT on admission showed patchy and confluent ground-glass and consolidative opacities primarily in the bilateral upper and lower lobes c/w a combination of infection and edema and small bilateral pleural effusions  - patient has improved w/ Lasix and Bipap, now on NC  - Echo showed EF 20% w/ severely decreased global left ventricular systolic function w/ multiple left ventricular regional wall motion abnormalities & grade II diastolic dysfunction.   - c/w Bumex &   metoprolol     NSTEMI  - trop peaked at 24,293  - as per cardio, will likely need transfer for cardiac catheterization, once infectious symptoms resolve  - c/w ASA, Plavix & Lipitor  - heparin gtt was stopped in ICU     Gram Negative Community Acquired Pneumonia   - likely due to PNA as CT showed hazy groundglass opacities in the upper lobes w/ mild to moderate bilateral pleural effusions with overlying compressive atelectasis  - s/p Ceftriaxone in ICU    - as per ID, stopped vanc stopped  - Sputum + pseudomonas   - BCx NGTD  - c/w Cefepime for total of 14 days,  needs 3 more days   -try to n wean off NC as tolerated    Urinary retention   - Douglass placed 9/30 after multiple straight caths  - urology following   -per my colleague's documentation " passed TOV 10/6 , good urine output"    LAURA-    on ckd stage 2  - ATN vs cardiorenal  - Cr slighly better       HTN   bp stable  DM2   - c/w Lantus, mealtime lispro and ISS,    - hgb A1C is 6.9  - endo following    Thyroid goiter   -per my colleague's documentation" CT showed goiter,  3.3 x 2.8 cm lesion in the  superior mediastinum compatible with substernal extension of thyroid  goiter, unchanged compared to 12/27/2022  - TSH is 0.258 but free T4 is 2, repeat tsh wnl  - will need outpatient follow-up "    Dispo: will need transfer to [facility] for cardiac cath case d/w medicine attending dr. dale hatch who is accepting   PT: REJI   updated wife at bedside her number is [phone] trista monsivais

## 2023-10-11 NOTE — DISCHARGE NOTE PROVIDER - CARE PROVIDERS DIRECT ADDRESSES
[email or web address]

## 2023-10-11 NOTE — DISCHARGE NOTE PROVIDER - CARE PROVIDER_API CALL
Internal Medicine Broderick Talamantes  Cardiology  [address] [place] [id number]  Phone: [phone]  Fax: [phone]  Follow Up Time:

## 2023-10-11 NOTE — PROGRESS NOTE ADULT - SUBJECTIVE AND OBJECTIVE BOX
Patient is a 58y old  Male who presents with a chief complaint of CHF Exacerbation (10 Oct 2023 17:10)      INTERVAL HPI/OVERNIGHT EVENTS: Overnight no fever    MEDICATIONS  (STANDING):  aspirin enteric coated 81 milliGRAM(s) Oral daily  atorvastatin 80 milliGRAM(s) Oral at bedtime  buMETAnide 2 milliGRAM(s) Oral daily  cefepime   IVPB      cefepime   IVPB 2000 milliGRAM(s) IV Intermittent every 12 hours  chlorhexidine 2% Cloths 1 Application(s) Topical <User Schedule>  clopidogrel Tablet 75 milliGRAM(s) Oral daily  enoxaparin Injectable 40 milliGRAM(s) SubCutaneous every 24 hours  influenza   Vaccine 0.5 milliLiter(s) IntraMuscular once  insulin glargine Injectable (LANTUS) 35 Unit(s) SubCutaneous at bedtime  insulin lispro (ADMELOG) corrective regimen sliding scale   SubCutaneous three times a day before meals  insulin lispro (ADMELOG) corrective regimen sliding scale   SubCutaneous at bedtime  insulin lispro Injectable (ADMELOG) 20 Unit(s) SubCutaneous three times a day before meals  metoprolol succinate ER 25 milliGRAM(s) Oral daily  pantoprazole    Tablet 40 milliGRAM(s) Oral before breakfast  tamsulosin 0.4 milliGRAM(s) Oral at bedtime    MEDICATIONS  (PRN):  acetaminophen     Tablet .. 650 milliGRAM(s) Oral every 6 hours PRN Temp greater or equal to 38C (100.4F), Mild Pain (1 - 3)  albuterol    0.083% 2.5 milliGRAM(s) Nebulizer every 6 hours PRN Shortness of Breath and/or Wheezing  dextrose Oral Gel 15 Gram(s) Oral once PRN Blood Glucose LESS THAN 70 milliGRAM(s)/deciliter  loperamide 2 milliGRAM(s) Oral daily PRN if >3 watery BMs per day    Allergies    No Known Allergies    Intolerances    Vital Signs Last 24 Hrs  T(C): 36.4 (11 Oct 2023 10:55), Max: 37.7 (11 Oct 2023 00:04)  T(F): 97.6 (11 Oct 2023 10:55), Max: 99.8 (11 Oct 2023 00:04)  HR: 90 (11 Oct 2023 10:55) (90 - 105)  BP: 101/67 (11 Oct 2023 10:55) (100/63 - 113/79)  BP(mean): --  RR: 18 (11 Oct 2023 10:55) (18 - 19)  SpO2: 97% (11 Oct 2023 10:55) (95% - 99%)    Parameters below as of 11 Oct 2023 00:04  Patient On (Oxygen Delivery Method): nasal cannula  O2 Flow (L/min): 2      PHYSICAL EXAM:  General: Nontoxic-appearing Male in no acute distress. NC  HEENT: AT/NC. Anicteric. Conjunctiva pink and moist. Oropharynx clear.  Neck: Not rigid. No sense of mass.  Nodes: None palpable.  Chest: AICD site benign  Lungs: diminished breath sounds b/l at the bases, no wheezes, no rhonchi   Heart: Regular rate and rhythm. No Murmur. No rub. No gallop. No palpable thrill.   Abdomen: Bowel sounds present and normoactive. Soft. Nondistended. Nontender. No sense of mass. No organomegaly.  Back: No spinal tenderness. No costovertebral angle tenderness.   Extremities: No cyanosis or clubbing. No edema.   Skin: Warm. Dry. Good turgor. No rash. No vasculitic stigmata.  Psychiatric: Appropriate affect and mood for situation.     LABS:           10-11    135  |  104  |  30<H>  ----------------------------<  217<H>  3.5   |  25  |  1.62<H>    Ca    8.4<L>      11 Oct 2023 07:10    10-11    135  |  104  |  30<H>  ----------------------------<  217<H>  3.5   |  25  |  1.62<H>    Ca    8.4<L>      11 Oct 2023 07:10          Urinalysis Basic - ( 10 Oct 2023 08:20 )    Color: x / Appearance: x / SG: x / pH: x  Gluc: 248 mg/dL / Ketone: x  / Bili: x / Urobili: x   Blood: x / Protein: x / Nitrite: x   Leuk Esterase: x / RBC: x / WBC x   Sq Epi: x / Non Sq Epi: x / Bacteria: x      CAPILLARY BLOOD GLUCOSE      CAPILLARY BLOOD GLUCOSE      POCT Blood Glucose.: 209 mg/dL (11 Oct 2023 11:33)  POCT Blood Glucose.: 208 mg/dL (11 Oct 2023 07:54)  POCT Blood Glucose.: 251 mg/dL (10 Oct 2023 20:53)  POCT Blood Glucose.: 219 mg/dL (10 Oct 2023 16:38)      RADIOLOGY & ADDITIONAL TESTS:    Imaging Personally Reviewed:  [ X] YES  [ ] NO    Consultant(s) Notes Reviewed:  [ X] YES  [ ] NO    Care Discussed with Consultants/Other Providers [X ] YES  [ ] NO

## 2023-10-11 NOTE — PROGRESS NOTE ADULT - SUBJECTIVE AND OBJECTIVE BOX
Patient is a 58y old  Male who presents with sob (11 Oct 2023 13:00)    PAST MEDICAL & SURGICAL HISTORY:  CVA (cerebral vascular accident)    HTN (hypertension)    DM (diabetes mellitus)    S/P hernia repair  right inguinal hernia 1992    Mobitz type II AV block.    s/p AICD    INTERVAL HISTORY: in no acute distress, denies any chest pain or sob   	  MEDICATIONS:  MEDICATIONS  (STANDING):  aspirin enteric coated 81 milliGRAM(s) Oral daily  atorvastatin 80 milliGRAM(s) Oral at bedtime  buMETAnide 2 milliGRAM(s) Oral daily  cefepime   IVPB 2000 milliGRAM(s) IV Intermittent every 12 hours  chlorhexidine 2% Cloths 1 Application(s) Topical <User Schedule>  clopidogrel Tablet 75 milliGRAM(s) Oral daily  enoxaparin Injectable 40 milliGRAM(s) SubCutaneous every 24 hours  influenza   Vaccine 0.5 milliLiter(s) IntraMuscular once  insulin glargine Injectable (LANTUS) 35 Unit(s) SubCutaneous at bedtime  insulin lispro (ADMELOG) corrective regimen sliding scale   SubCutaneous three times a day before meals  insulin lispro (ADMELOG) corrective regimen sliding scale   SubCutaneous at bedtime  insulin lispro Injectable (ADMELOG) 20 Unit(s) SubCutaneous three times a day before meals  metoprolol succinate ER 25 milliGRAM(s) Oral daily  pantoprazole    Tablet 40 milliGRAM(s) Oral before breakfast  tamsulosin 0.4 milliGRAM(s) Oral at bedtime    MEDICATIONS  (PRN):  acetaminophen     Tablet .. 650 milliGRAM(s) Oral every 6 hours PRN Temp greater or equal to 38C (100.4F), Mild Pain (1 - 3)  albuterol    0.083% 2.5 milliGRAM(s) Nebulizer every 6 hours PRN Shortness of Breath and/or Wheezing  dextrose Oral Gel 15 Gram(s) Oral once PRN Blood Glucose LESS THAN 70 milliGRAM(s)/deciliter  loperamide 2 milliGRAM(s) Oral daily PRN if >3 watery BMs per day    Vitals:  T(F): 97.6 (10-11-23 @ 10:55), Max: 99.8 (10-11-23 @ 00:04)  HR: 90 (10-11-23 @ 10:55) (90 - 105)  BP: 101/67 (10-11-23 @ 10:55) (100/63 - 113/79)  RR: 18 (10-11-23 @ 10:55) (18 - 19)  SpO2: 97% (10-11-23 @ 10:55) (95% - 99%)    10-10 @ 07:01  -  10-11 @ 07:00  --------------------------------------------------------  IN:  Total IN: 0 mL    OUT:    Stool (mL): 2 mL  Total OUT: 2 mL    Total NET: -2 mL    10-11 @ 07:01  -  10-11 @ 14:05  --------------------------------------------------------  IN:    Oral Fluid: 340 mL  Total IN: 340 mL    OUT:  Total OUT: 0 mL    Total NET: 340 mL    PHYSICAL EXAM:  Neuro: Awake, responsive  CV: S1 S2 RRR  Lungs: CTABL  GI: Soft, BS +, ND, NT  Extremities: No edema    TELEMETRY: paced     RADIOLOGY:   < from: Xray Chest 2 Views PA/Lat (10.02.23 @ 19:35) >  IMPRESSION:   Moderate bilateral pleural effusions and/or posterior   basilar airspace consolidations obscuring LEFT diaphragm contour.    < end of copied text >    < from: CT Abdomen and Pelvis w/ Oral Cont (10.02.23 @ 18:46) >  1. Hazy groundglass opacities in the upper lobes. Mild to moderate   bilateral pleural effusions with overlying compressive atelectasis.   Differential considerations include edema and/or infection.  2. Unremarkable, unenhanced CT of the abdomen and pelvis.    < end of copied text >  DIAGNOSTIC TESTING:    [x ] Echocardiogram: < from: TTE Echo Complete w/o Contrast w/ Doppler (09.26.23 @ 13:20) >  Left Ventricle: The left ventricular internal cavity size is normal.  Global LV systolic function was severely decreased. Left ventricular   ejection fraction, by visual estimation, is 20%. Spectral Doppler shows   pseudonormal pattern of left ventricular myocardial filling (Grade II   diastolic dysfunction). Elevated left ventricular end-diastolic pressure.   Global longitudinal strain using GE software at a HR of 121bpm and BP   137/99 is -5.1%, which is severely reduced. There is some sparing of the   basal segments.    LV Wall Scoring:  The entire apex is akinetic. The mid anteroseptal segment, mid   inferoseptal  segment, mid inferolateral segment, mid anterolateral segment, mid   anterior  segment, and mid inferior segment are hypokinetic. All remaining scored  segments are normal.    Right Ventricle: RV systolic function is moderately reduced.  Left Atrium: The left atrium is normal in size.  Right Atrium: The right atrium was not well visualized.  Pericardium: Trivial pericardial effusion is present.  Mitral Valve: Structurally normal mitral valve, with normal leaflet   excursion. There is mild mitral annular calcification.  Tricuspid Valve: Mild tricuspid regurgitation is visualized.      Summary:   1. Technically limited study.   2. Left ventricular ejection fraction, by visual estimation, is 20%.   3. Severely decreased global left ventricular systolic function.   4. Multiple left ventricular regional wall motion abnormalities exist.   See wall motion findings.   5. Elevated left ventricular end-diastolic pressure.   6. Spectral Doppler shows pseudonormal pattern of left ventricular   myocardial filling (Grade II diastolic dysfunction).   7. Global longitudinal strain using GE software at a HR of 121bpm and BP   137/99 is -5.1%, which is severely reduced. There is some sparing of the   basal segments.   8. Moderately reduced RVsystolic function.   9. The left atrium is normal in size.  10. Trivial pericardial effusion.  11. Mild tricuspid regurgitation.    < end of copied text >    LABS:	 	    11 Oct 2023 07:10    135    |  104    |  30     ----------------------------<  217    3.5     |  25     |  1.62   10 Oct 2023 08:20    132    |  101    |  31     ----------------------------<  248    4.0     |  22     |  1.58   09 Oct 2023 08:52    136    |  105    |  29     ----------------------------<  193    3.6     |  23     |  1.56     Ca    8.4        11 Oct 2023 07:10                        11.9   10.78 )-----------( 437      ( 11 Oct 2023 07:10 )             38.6 ,                       12.7   12.43 )-----------( 440      ( 10 Oct 2023 08:20 )             39.2 ,                       11.7   10.14 )-----------( 408      ( 09 Oct 2023 08:52 )             36.6

## 2023-10-11 NOTE — DISCHARGE NOTE PROVIDER - DETAILS OF MALNUTRITION DIAGNOSIS/DIAGNOSES
This patient has been assessed with a concern for Malnutrition and was treated during this hospitalization for the following Nutrition diagnosis/diagnoses:     -  09/29/2023: Moderate protein-calorie malnutrition

## 2023-10-11 NOTE — DISCHARGE NOTE PROVIDER - NSDCMRMEDTOKEN_GEN_ALL_CORE_FT
aspirin 81 mg oral delayed release tablet: 1 tab(s) orally once a day  atorvastatin 80 mg oral tablet: 1 tab(s) orally once a day (at bedtime)  bumetanide 2 mg oral tablet: 1 tab(s) orally once a day  Calcium 500+D oral tablet, chewable: 1 tab(s) orally once a day   clopidogrel 75 mg oral tablet: 1 tab(s) orally once a day  ertugliflozin 15 mg oral tablet: 1 tab(s) orally once a day (in the morning)  finasteride 5 mg oral tablet: 1 tab(s) orally once a day  loperamide 2 mg oral capsule: 1 cap(s) orally once a day As needed if &gt;3 watery BMs per day  losartan 50 mg oral tablet: 1 tab(s) orally once a day  metoprolol succinate 25 mg oral tablet, extended release: 1 tab(s) orally once a day  pantoprazole 40 mg oral delayed release tablet: 1 tab(s) orally once a day (before a meal)  tamsulosin 0.4 mg oral capsule: 1 cap(s) orally once a day (at bedtime)

## 2023-10-11 NOTE — DISCHARGE NOTE PROVIDER - NSDCCPCAREPLAN_GEN_ALL_CORE_FT
PRINCIPAL DISCHARGE DIAGNOSIS  Diagnosis: Chest pain  Assessment and Plan of Treatment: for transfer to [facility] for cardiac cath      SECONDARY DISCHARGE DIAGNOSES  Diagnosis: Epigastric pain  Assessment and Plan of Treatment:     Diagnosis: Paresthesia  Assessment and Plan of Treatment:     Diagnosis: Elevated troponin  Assessment and Plan of Treatment:     Diagnosis: Multifocal pneumonia  Assessment and Plan of Treatment:     Diagnosis: Lactate blood increased  Assessment and Plan of Treatment:     Diagnosis: Hypoxia  Assessment and Plan of Treatment:

## 2023-10-11 NOTE — DISCHARGE NOTE PROVIDER - NSDCFUSCHEDAPPT_GEN_ALL_CORE_FT
[facility]  ELECTROPH [address]  Scheduled Appointment: 11/08/2023    Kiko Medina  [facility]  ENDOCRIN [address]  Scheduled Appointment: 01/08/2024

## 2023-10-11 NOTE — PROGRESS NOTE ADULT - ASSESSMENT
57 yo m pmhx CVA with mild left sided deficits, HTN, DM2, vertigo, HLD, Mobitz II s/pp Medtronic dual chamber ICD (12/21/22) biba from home with complaints of dyspnea and chest pain and was admitted w/ acute hypoxic respiratory failure.  Found to have fever with +enterovirus infection.   Trop peaked at [id number]  Echo: 9/26/23 LVEF 20%. +WMA. Grade 2 DD. Mod reduced RV function   Echo: 12/20/22 Mild mitral regurgitation. Normal left ventricular systolic function.     1) Acute Hypoxic Respiratory Failure 2ndary Acute HFrEF and +entero/rhinovirus  2) NSTEMI   3) New Cardiomyopathy (LVEF 20%)     - patient appears comfortable, no chest pain, SOB  - tele reviewed: paced rhythm   - patient seen at [facility] 12/2022 w/ bradycardia/ Mobitz type 2 AVB w/normal LVEF, plan was for PPM but upgraded to ICD for questionable sarcoidosis, which was then ruled out with PET scan later on   - Echo reviewed +WMA, LVEF 20%  - c/w DAPT, s/p IV heparin x 72 hrs while in ICU  - No overt fluid overload noted, currently continued on Bumex 2mg PO daily, monitor renal function and electrolytes   -cont with Toprol XL 25 daily, unable to optimize GDMT with renal insufficiency and low BP  - c/w strict I&O, maintain negative balance, daily weight  - Antibiotics as per primary team/ID, when infection resolves and ID clears, will then arrange for transfer for cardiac cath.

## 2023-10-12 LAB
ANION GAP SERPL CALC-SCNC: 9 MMOL/L — SIGNIFICANT CHANGE UP (ref 5–17)
BUN SERPL-MCNC: 28 MG/DL — HIGH (ref 7–23)
CALCIUM SERPL-MCNC: 8.2 MG/DL — LOW (ref 8.5–10.1)
CHLORIDE SERPL-SCNC: 104 MMOL/L — SIGNIFICANT CHANGE UP (ref 96–108)
CO2 SERPL-SCNC: 23 MMOL/L — SIGNIFICANT CHANGE UP (ref 22–31)
CREAT SERPL-MCNC: 1.53 MG/DL — HIGH (ref 0.5–1.3)
EGFR: 53 ML/MIN/1.73M2 — LOW
GLUCOSE BLDC GLUCOMTR-MCNC: 156 MG/DL — HIGH (ref 70–99)
GLUCOSE BLDC GLUCOMTR-MCNC: 164 MG/DL — HIGH (ref 70–99)
GLUCOSE BLDC GLUCOMTR-MCNC: 169 MG/DL — HIGH (ref 70–99)
GLUCOSE BLDC GLUCOMTR-MCNC: 197 MG/DL — HIGH (ref 70–99)
GLUCOSE SERPL-MCNC: 210 MG/DL — HIGH (ref 70–99)
POTASSIUM SERPL-MCNC: 3.6 MMOL/L — SIGNIFICANT CHANGE UP (ref 3.5–5.3)
POTASSIUM SERPL-SCNC: 3.6 MMOL/L — SIGNIFICANT CHANGE UP (ref 3.5–5.3)
SODIUM SERPL-SCNC: 136 MMOL/L — SIGNIFICANT CHANGE UP (ref 135–145)

## 2023-10-12 PROCEDURE — 99232 SBSQ HOSP IP/OBS MODERATE 35: CPT

## 2023-10-12 RX ORDER — INSULIN GLARGINE 100 [IU]/ML
40 INJECTION, SOLUTION SUBCUTANEOUS AT BEDTIME
Refills: 0 | Status: DISCONTINUED | OUTPATIENT
Start: 2023-10-12 | End: 2023-10-13

## 2023-10-12 RX ADMIN — ATORVASTATIN CALCIUM 80 MILLIGRAM(S): 80 TABLET, FILM COATED ORAL at 22:09

## 2023-10-12 RX ADMIN — CEFEPIME 100 MILLIGRAM(S): 1 INJECTION, POWDER, FOR SOLUTION INTRAMUSCULAR; INTRAVENOUS at 05:24

## 2023-10-12 RX ADMIN — TAMSULOSIN HYDROCHLORIDE 0.4 MILLIGRAM(S): 0.4 CAPSULE ORAL at 22:08

## 2023-10-12 RX ADMIN — BUMETANIDE 2 MILLIGRAM(S): 0.25 INJECTION INTRAMUSCULAR; INTRAVENOUS at 05:24

## 2023-10-12 RX ADMIN — INSULIN GLARGINE 40 UNIT(S): 100 INJECTION, SOLUTION SUBCUTANEOUS at 22:06

## 2023-10-12 RX ADMIN — Medication 2: at 09:04

## 2023-10-12 RX ADMIN — Medication 20 UNIT(S): at 17:05

## 2023-10-12 RX ADMIN — PANTOPRAZOLE SODIUM 40 MILLIGRAM(S): 20 TABLET, DELAYED RELEASE ORAL at 06:19

## 2023-10-12 RX ADMIN — CHLORHEXIDINE GLUCONATE 1 APPLICATION(S): 213 SOLUTION TOPICAL at 05:23

## 2023-10-12 RX ADMIN — Medication 20 UNIT(S): at 09:05

## 2023-10-12 RX ADMIN — Medication 2: at 17:05

## 2023-10-12 RX ADMIN — Medication 25 MILLIGRAM(S): at 05:26

## 2023-10-12 RX ADMIN — CEFEPIME 100 MILLIGRAM(S): 1 INJECTION, POWDER, FOR SOLUTION INTRAMUSCULAR; INTRAVENOUS at 17:05

## 2023-10-12 RX ADMIN — ENOXAPARIN SODIUM 40 MILLIGRAM(S): 100 INJECTION SUBCUTANEOUS at 05:25

## 2023-10-12 NOTE — PROGRESS NOTE ADULT - SUBJECTIVE AND OBJECTIVE BOX
Patient is a 58y old  Male who presents with CHF Exacerbation (12 Oct 2023 07:57)    PAST MEDICAL & SURGICAL HISTORY:  CVA (cerebral vascular accident)    HTN (hypertension)    DM (diabetes mellitus)    S/P hernia repair  right inguinal hernia 1992    INTERVAL HISTORY: in no distress, denies any chest pain or sob   	  MEDICATIONS:  MEDICATIONS  (STANDING):  aspirin enteric coated 81 milliGRAM(s) Oral daily  atorvastatin 80 milliGRAM(s) Oral at bedtime  buMETAnide 2 milliGRAM(s) Oral daily  cefepime   IVPB 2000 milliGRAM(s) IV Intermittent every 12 hours  chlorhexidine 2% Cloths 1 Application(s) Topical <User Schedule>  clopidogrel Tablet 75 milliGRAM(s) Oral daily  enoxaparin Injectable 40 milliGRAM(s) SubCutaneous every 24 hours  influenza   Vaccine 0.5 milliLiter(s) IntraMuscular once  insulin glargine Injectable (LANTUS) 40 Unit(s) SubCutaneous at bedtime  insulin lispro (ADMELOG) corrective regimen sliding scale   SubCutaneous three times a day before meals  insulin lispro (ADMELOG) corrective regimen sliding scale   SubCutaneous at bedtime  insulin lispro Injectable (ADMELOG) 20 Unit(s) SubCutaneous three times a day before meals  metoprolol succinate ER 25 milliGRAM(s) Oral daily  pantoprazole    Tablet 40 milliGRAM(s) Oral before breakfast  tamsulosin 0.4 milliGRAM(s) Oral at bedtime    MEDICATIONS  (PRN):  acetaminophen     Tablet .. 650 milliGRAM(s) Oral every 6 hours PRN Temp greater or equal to 38C (100.4F), Mild Pain (1 - 3)  albuterol    0.083% 2.5 milliGRAM(s) Nebulizer every 6 hours PRN Shortness of Breath and/or Wheezing  dextrose Oral Gel 15 Gram(s) Oral once PRN Blood Glucose LESS THAN 70 milliGRAM(s)/deciliter  loperamide 2 milliGRAM(s) Oral daily PRN if >3 watery BMs per day    Vitals:  T(F): 97.9 (10-12-23 @ 10:57), Max: 98.4 (10-11-23 @ 16:14)  HR: 108 (10-12-23 @ 10:57) (106 - 108)  BP: 110/75 (10-12-23 @ 10:57) (106/74 - 112/76)  RR: 18 (10-12-23 @ 10:57) (18 - 19)  SpO2: 99% (10-12-23 @ 10:57) (96% - 99%)    10-11 @ 07:01  -  10-12 @ 07:00  --------------------------------------------------------  IN:    Oral Fluid: 340 mL  Total IN: 340 mL    OUT:  Total OUT: 0 mL    Total NET: 340 mL    PHYSICAL EXAM:  Neuro: Awake, responsive  CV: S1 S2 RRR  Lungs: CTABL  GI: Soft, BS +, ND, NT  Extremities: No edema    TELEMETRY: paced    RADIOLOGY: < from: Xray Chest 2 Views PA/Lat (10.02.23 @ 19:35) >  IMPRESSION:   Moderate bilateral pleural effusions and/or posterior   basilar airspace consolidations obscuring LEFT diaphragm contour.  Please see same day chest CT report..    < end of copied text >    DIAGNOSTIC TESTING:    [x ] Echocardiogram: < from: TTE Echo Complete w/o Contrast w/ Doppler (09.26.23 @ 13:20) >  Left Ventricle: The left ventricular internal cavity size is normal.  Global LV systolic function was severely decreased. Left ventricular   ejection fraction, by visual estimation, is 20%. Spectral Doppler shows   pseudonormal pattern of left ventricular myocardial filling (Grade II   diastolic dysfunction). Elevated left ventricular end-diastolic pressure.   Global longitudinal strain using [employer] software at a HR of 121bpm and BP   137/99 is -5.1%, which is severely reduced. There is some sparing of the   basal segments.      LV Wall Scoring:  The entire apex is akinetic. The mid anteroseptal segment, mid   inferoseptal  segment, mid inferolateral segment, mid anterolateral segment, mid   anterior  segment, and mid inferior segment are hypokinetic. All remaining scored  segments are normal.    Right Ventricle: RV systolic function is moderately reduced.  Left Atrium: The left atrium is normal in size.  Right Atrium: The right atrium was not well visualized.  Pericardium: Trivial pericardial effusion is present.  Mitral Valve: Structurally normal mitral valve, with normal leaflet   excursion. There is mild mitral annular calcification.  Tricuspid Valve: Mild tricuspid regurgitation is visualized.      Summary:   1. Technically limited study.   2. Left ventricular ejection fraction, by visual estimation, is 20%.   3. Severely decreased global left ventricular systolic function.   4. Multiple left ventricular regional wall motion abnormalities exist.   See wall motion findings.   5. Elevated left ventricular end-diastolic pressure.   6. Spectral Doppler shows pseudonormal pattern of left ventricular   myocardial filling (Grade II diastolic dysfunction).   7. Global longitudinal strain using [employer] software at a HR of 121bpm and BP   137/99 is -5.1%, which is severely reduced. There is some sparing of the   basal segments.   8. Moderately reduced RVsystolic function.   9. The left atrium is normal in size.  10. Trivial pericardial effusion.  11. Mild tricuspid regurgitation.    < end of copied text >    LABS:	 	    12 Oct 2023 08:00    136    |  104    |  28     ----------------------------<  210    3.6     |  23     |  1.53   11 Oct 2023 07:10    135    |  104    |  30     ----------------------------<  217    3.5     |  25     |  1.62   10 Oct 2023 08:20    132    |  101    |  31     ----------------------------<  248    4.0     |  22     |  1.58     Ca    8.2        12 Oct 2023 08:00                        11.9   10.78 )-----------( 437      ( 11 Oct 2023 07:10 )             38.6 ,                       12.7   12.43 )-----------( 440      ( 10 Oct 2023 08:20 )             39.2

## 2023-10-12 NOTE — PROGRESS NOTE ADULT - ASSESSMENT
59 yo m pmhx CVA with mild left sided deficits, HTN, DM2, vertigo, HLD, Mobitz II s/pp Medtronic dual chamber ICD (12/21/22) biba from home with complaints of dyspnea and chest pain and was admitted w/ acute hypoxic respiratory failure likely due to acute pulmonary edema due to acute HFrEF in setting of acute NSTEMI, LAURA and enterovirus infection.     Acute hypoxic respiratory failure due to acute sys/ diastolic CHF  - CT on admission showed patchy and confluent ground-glass and consolidative opacities primarily in the bilateral upper and lower lobes c/w a combination of infection and edema and small bilateral pleural effusions  - patient has improved w/ Lasix and Bipap, now on NC  - Echo showed EF 20% w/ severely decreased global left ventricular systolic function w/ multiple left ventricular regional wall motion abnormalities & grade II diastolic dysfunction.   - c/w Bumex &   metoprolol     NSTEMI  - trop peaked at 24,293  - as per cardio, will likely need transfer for cardiac catheterization, once infectious symptoms resolve  - c/w ASA, Plavix & Lipitor  - heparin gtt was stopped in ICU     Gram Negative Community Acquired Pneumonia   - likely due to PNA as CT showed hazy groundglass opacities in the upper lobes w/ mild to moderate bilateral pleural effusions with overlying compressive atelectasis  - s/p Ceftriaxone in ICU    - as per ID, stopped vanc stopped  - Sputum + pseudomonas   - BCx NGTD  - c/w Cefepime for total of 14 days,  needs 2 more days   -try to n wean off NC as tolerated    Urinary retention   - Douglass placed 9/30 after multiple straight caths  - urology following   -per my colleague's documentation " passed TOV 10/6 , good urine output"    LAURA-    on ckd stage 2  - ATN vs cardiorenal  - Cr slighly better       HTN   bp stable  DM2   - c/w Lantus, mealtime lispro and ISS,    - hgb A1C is 6.9  - endo following  10/12/2023 incerased lantis 40 units qhs    Thyroid goiter   -per my colleague's documentation" CT showed goiter,  3.3 x 2.8 cm lesion in the  superior mediastinum compatible with substernal extension of thyroid  goiter, unchanged compared to 12/27/2022  - TSH is 0.258 but free T4 is 2, repeat tsh wnl  - will need outpatient follow-up "    Dispo: will need transfer to [facility] for cardiac cath case d/w medicine attending dr. dale hatch who is accepting   PT: REJI   updated wife at bedside her number is [phone] trista [email or web address]

## 2023-10-12 NOTE — PROGRESS NOTE ADULT - ASSESSMENT
57 yo m pmhx CVA with mild left sided deficits, HTN, DM2, vertigo, HLD, Mobitz II s/pp Medtronic dual chamber ICD (12/21/22) biba from home with complaints of dyspnea and chest pain and was admitted w/ acute hypoxic respiratory failure.  Found to have fever with +enterovirus infection.   Trop peaked at [address]  Echo: 9/26/23 LVEF 20%. +WMA. Grade 2 DD. Mod reduced RV function   Echo: 12/20/22 Mild mitral regurgitation. Normal left ventricular systolic function.     1) Acute Hypoxic Respiratory Failure 2ndary Acute HFrEF and +entero/rhinovirus  2) NSTEMI   3) New Cardiomyopathy (LVEF 20%)     - patient appears comfortable, no chest pain, SOB  - tele reviewed: paced rhythm   - patient seen at [facility] 12/2022 w/ bradycardia/ Mobitz type 2 AVB w/normal LVEF, plan was for PPM but upgraded to ICD for questionable sarcoidosis, which was then ruled out with PET scan later on   - Echo reviewed +WMA, LVEF 20%  - c/w DAPT, s/p IV heparin x 72 hrs while in ICU  - No overt fluid overload noted, currently continued on Bumex 2mg PO daily, monitor renal function and electrolytes, Creat today 1.5   -cont with Toprol XL 25 daily, unable to optimize GDMT with renal insufficiency and low BP  - c/w strict I&O, maintain negative balance, daily weight  - Antibiotics as per primary team/ID, when infection resolves and ID clears, will then arrange for transfer for cardiac cath. 59 yo m pmhx CVA with mild left sided deficits, HTN, DM2, vertigo, HLD, Mobitz II s/pp Medtronic dual chamber ICD (12/21/22) biba from home with complaints of dyspnea and chest pain and was admitted w/ acute hypoxic respiratory failure.  Found to have fever with +enterovirus infection.   Trop peaked at 23242  Echo: 9/26/23 LVEF 20%. +WMA. Grade 2 DD. Mod reduced RV function   Echo: 12/20/22 Mild mitral regurgitation. Normal left ventricular systolic function.     1) Acute Hypoxic Respiratory Failure 2ndary Acute HFrEF and +entero/rhinovirus  2) NSTEMI   3) New Cardiomyopathy (LVEF 20%)     - patient appears comfortable, no chest pain, SOB  - tele reviewed: paced rhythm   - patient seen at [facility] 12/2022 w/ bradycardia/ Mobitz type 2 AVB w/normal LVEF, plan was for PPM but upgraded to ICD for questionable sarcoidosis, which was then ruled out with PET scan later on   - Echo reviewed +WMA, LVEF 20%  - c/w DAPT, s/p IV heparin x 72 hrs while in ICU  - No overt fluid overload noted, currently continued on Bumex 2mg PO daily, monitor renal function and electrolytes, Creat today 1.5   -cont with Toprol XL 25 daily, unable to optimize GDMT with renal insufficiency and low BP  - c/w strict I&O, maintain negative balance, daily weight  - Antibiotics as per primary team/ID, when infection resolves and ID clears, will then arrange for transfer for cardiac cath. AS per ID pt needs total of 14 days of Cefepime ->ends on 10/14.

## 2023-10-12 NOTE — PROGRESS NOTE ADULT - SUBJECTIVE AND OBJECTIVE BOX
Patient is a 58y old  Male who presents with a chief complaint of CHF Exacerbation (10 Oct 2023 17:10)      INTERVAL HPI/OVERNIGHT EVENTS: Overnight no fever   today await xfer to [facility]    MEDICATIONS  (STANDING):  aspirin enteric coated 81 milliGRAM(s) Oral daily  atorvastatin 80 milliGRAM(s) Oral at bedtime  buMETAnide 2 milliGRAM(s) Oral daily  cefepime   IVPB      cefepime   IVPB 2000 milliGRAM(s) IV Intermittent every 12 hours  chlorhexidine 2% Cloths 1 Application(s) Topical <User Schedule>  clopidogrel Tablet 75 milliGRAM(s) Oral daily  enoxaparin Injectable 40 milliGRAM(s) SubCutaneous every 24 hours  influenza   Vaccine 0.5 milliLiter(s) IntraMuscular once  insulin glargine Injectable (LANTUS) 35 Unit(s) SubCutaneous at bedtime  insulin lispro (ADMELOG) corrective regimen sliding scale   SubCutaneous three times a day before meals  insulin lispro (ADMELOG) corrective regimen sliding scale   SubCutaneous at bedtime  insulin lispro Injectable (ADMELOG) 20 Unit(s) SubCutaneous three times a day before meals  metoprolol succinate ER 25 milliGRAM(s) Oral daily  pantoprazole    Tablet 40 milliGRAM(s) Oral before breakfast  tamsulosin 0.4 milliGRAM(s) Oral at bedtime    MEDICATIONS  (PRN):  acetaminophen     Tablet .. 650 milliGRAM(s) Oral every 6 hours PRN Temp greater or equal to 38C (100.4F), Mild Pain (1 - 3)  albuterol    0.083% 2.5 milliGRAM(s) Nebulizer every 6 hours PRN Shortness of Breath and/or Wheezing  dextrose Oral Gel 15 Gram(s) Oral once PRN Blood Glucose LESS THAN 70 milliGRAM(s)/deciliter  loperamide 2 milliGRAM(s) Oral daily PRN if >3 watery BMs per day    Allergies    No Known Allergies    Intolerances  Vital Signs Last 24 Hrs  T(C): 36.8 (12 Oct 2023 04:47), Max: 36.9 (11 Oct 2023 16:14)  T(F): 98.3 (12 Oct 2023 04:47), Max: 98.4 (11 Oct 2023 16:14)  HR: 106 (12 Oct 2023 04:47) (90 - 106)  BP: 106/74 (12 Oct 2023 04:47) (101/67 - 115/76)  BP(mean): 88 (11 Oct 2023 16:14) (88 - 88)  RR: 18 (12 Oct 2023 04:47) (18 - 19)  SpO2: 96% (12 Oct 2023 04:47) (96% - 98%)    Parameters below as of 11 Oct 2023 15:10  Patient On (Oxygen Delivery Method): nasal cannula  O2 Flow (L/min): 2      PHYSICAL EXAM:  General: Nontoxic-appearing Male in no acute distress. NC  HEENT: AT/NC. Anicteric. Conjunctiva pink and moist. Oropharynx clear.  Neck: Not rigid. No sense of mass.  Nodes: None palpable.  Chest: AICD site benign  Lungs: diminished breath sounds b/l at the bases, no wheezes, no rhonchi   Heart: Regular rate and rhythm. No Murmur. No rub. No gallop. No palpable thrill.   Abdomen: Bowel sounds present and normoactive. Soft. Nondistended. Nontender. No sense of mass. No organomegaly.  Back: No spinal tenderness. No costovertebral angle tenderness.   Extremities: No cyanosis or clubbing. No edema.   Skin: Warm. Dry. Good turgor. No rash. No vasculitic stigmata.  Psychiatric: Appropriate affect and mood for situation.     LABS:             CAPILLARY BLOOD GLUCOSE    CAPILLARY BLOOD GLUCOSE      POCT Blood Glucose.: 217 mg/dL (11 Oct 2023 22:35)  POCT Blood Glucose.: 396 mg/dL (11 Oct 2023 16:48)  POCT Blood Glucose.: 209 mg/dL (11 Oct 2023 11:33)    RADIOLOGY & ADDITIONAL TESTS:    Imaging Personally Reviewed:  [ X] YES  [ ] NO    Consultant(s) Notes Reviewed:  [ X] YES  [ ] NO    Care Discussed with Consultants/Other Providers [X ] YES  [ ] NO

## 2023-10-13 ENCOUNTER — TRANSCRIPTION ENCOUNTER (OUTPATIENT)
Age: 58
End: 2023-10-13

## 2023-10-13 ENCOUNTER — INPATIENT (INPATIENT)
Facility: HOSPITAL | Age: 58
LOS: 38 days | DRG: 264 | End: 2023-11-21
Attending: HOSPITALIST | Admitting: STUDENT IN AN ORGANIZED HEALTH CARE EDUCATION/TRAINING PROGRAM
Payer: MEDICAID

## 2023-10-13 VITALS
DIASTOLIC BLOOD PRESSURE: 70 MMHG | RESPIRATION RATE: 18 BRPM | OXYGEN SATURATION: 99 % | SYSTOLIC BLOOD PRESSURE: 104 MMHG | HEART RATE: 109 BPM | TEMPERATURE: 98 F

## 2023-10-13 VITALS
HEART RATE: 124 BPM | TEMPERATURE: 98 F | WEIGHT: 157.19 LBS | RESPIRATION RATE: 18 BRPM | OXYGEN SATURATION: 91 % | DIASTOLIC BLOOD PRESSURE: 86 MMHG | SYSTOLIC BLOOD PRESSURE: 123 MMHG

## 2023-10-13 DIAGNOSIS — N17.9 ACUTE KIDNEY FAILURE, UNSPECIFIED: ICD-10-CM

## 2023-10-13 DIAGNOSIS — R00.0 TACHYCARDIA, UNSPECIFIED: ICD-10-CM

## 2023-10-13 DIAGNOSIS — Z29.9 ENCOUNTER FOR PROPHYLACTIC MEASURES, UNSPECIFIED: ICD-10-CM

## 2023-10-13 DIAGNOSIS — Z98.890 OTHER SPECIFIED POSTPROCEDURAL STATES: Chronic | ICD-10-CM

## 2023-10-13 DIAGNOSIS — Z98.89 OTHER SPECIFIED POSTPROCEDURAL STATES: Chronic | ICD-10-CM

## 2023-10-13 DIAGNOSIS — E11.9 TYPE 2 DIABETES MELLITUS WITHOUT COMPLICATIONS: ICD-10-CM

## 2023-10-13 DIAGNOSIS — I50.21 ACUTE SYSTOLIC (CONGESTIVE) HEART FAILURE: ICD-10-CM

## 2023-10-13 DIAGNOSIS — I21.4 NON-ST ELEVATION (NSTEMI) MYOCARDIAL INFARCTION: ICD-10-CM

## 2023-10-13 DIAGNOSIS — R07.9 CHEST PAIN, UNSPECIFIED: ICD-10-CM

## 2023-10-13 DIAGNOSIS — J96.01 ACUTE RESPIRATORY FAILURE WITH HYPOXIA: ICD-10-CM

## 2023-10-13 LAB
ANION GAP SERPL CALC-SCNC: 6 MMOL/L — SIGNIFICANT CHANGE UP (ref 5–17)
BUN SERPL-MCNC: 30 MG/DL — HIGH (ref 7–23)
CALCIUM SERPL-MCNC: 7.9 MG/DL — LOW (ref 8.5–10.1)
CHLORIDE SERPL-SCNC: 104 MMOL/L — SIGNIFICANT CHANGE UP (ref 96–108)
CO2 SERPL-SCNC: 26 MMOL/L — SIGNIFICANT CHANGE UP (ref 22–31)
CREAT SERPL-MCNC: 1.43 MG/DL — HIGH (ref 0.5–1.3)
EGFR: 58 ML/MIN/1.73M2 — LOW
GLUCOSE BLDC GLUCOMTR-MCNC: 155 MG/DL — HIGH (ref 70–99)
GLUCOSE BLDC GLUCOMTR-MCNC: 176 MG/DL — HIGH (ref 70–99)
GLUCOSE BLDC GLUCOMTR-MCNC: 248 MG/DL — HIGH (ref 70–99)
GLUCOSE BLDC GLUCOMTR-MCNC: 291 MG/DL — HIGH (ref 70–99)
GLUCOSE SERPL-MCNC: 176 MG/DL — HIGH (ref 70–99)
HCT VFR BLD CALC: 34.5 % — LOW (ref 39–50)
HGB BLD-MCNC: 10.9 G/DL — LOW (ref 13–17)
MAGNESIUM SERPL-MCNC: 2.3 MG/DL — SIGNIFICANT CHANGE UP (ref 1.6–2.6)
MCHC RBC-ENTMCNC: 25.6 PG — LOW (ref 27–34)
MCHC RBC-ENTMCNC: 31.6 G/DL — LOW (ref 32–36)
MCV RBC AUTO: 81.2 FL — SIGNIFICANT CHANGE UP (ref 80–100)
NRBC # BLD: 0 /100 WBCS — SIGNIFICANT CHANGE UP (ref 0–0)
NT-PROBNP SERPL-SCNC: 5454 PG/ML — HIGH (ref 0–125)
PHOSPHATE SERPL-MCNC: 3.3 MG/DL — SIGNIFICANT CHANGE UP (ref 2.5–4.5)
PLATELET # BLD AUTO: 359 K/UL — SIGNIFICANT CHANGE UP (ref 150–400)
POTASSIUM SERPL-MCNC: 3.8 MMOL/L — SIGNIFICANT CHANGE UP (ref 3.5–5.3)
POTASSIUM SERPL-SCNC: 3.8 MMOL/L — SIGNIFICANT CHANGE UP (ref 3.5–5.3)
RBC # BLD: 4.25 M/UL — SIGNIFICANT CHANGE UP (ref 4.2–5.8)
RBC # FLD: 14.3 % — SIGNIFICANT CHANGE UP (ref 10.3–14.5)
SODIUM SERPL-SCNC: 136 MMOL/L — SIGNIFICANT CHANGE UP (ref 135–145)
WBC # BLD: 8.65 K/UL — SIGNIFICANT CHANGE UP (ref 3.8–10.5)
WBC # FLD AUTO: 8.65 K/UL — SIGNIFICANT CHANGE UP (ref 3.8–10.5)

## 2023-10-13 PROCEDURE — 33289 TCAT IMPL WRLS P-ART PRS SNR: CPT

## 2023-10-13 PROCEDURE — 99223 1ST HOSP IP/OBS HIGH 75: CPT

## 2023-10-13 PROCEDURE — 99232 SBSQ HOSP IP/OBS MODERATE 35: CPT

## 2023-10-13 RX ORDER — METOPROLOL TARTRATE 50 MG
25 TABLET ORAL DAILY
Refills: 0 | Status: DISCONTINUED | OUTPATIENT
Start: 2023-10-14 | End: 2023-10-19

## 2023-10-13 RX ORDER — DEXTROSE 50 % IN WATER 50 %
12.5 SYRINGE (ML) INTRAVENOUS ONCE
Refills: 0 | Status: DISCONTINUED | OUTPATIENT
Start: 2023-10-13 | End: 2023-10-28

## 2023-10-13 RX ORDER — CLOPIDOGREL BISULFATE 75 MG/1
75 TABLET, FILM COATED ORAL DAILY
Refills: 0 | Status: DISCONTINUED | OUTPATIENT
Start: 2023-10-14 | End: 2023-10-16

## 2023-10-13 RX ORDER — INSULIN LISPRO 100/ML
VIAL (ML) SUBCUTANEOUS
Refills: 0 | Status: DISCONTINUED | OUTPATIENT
Start: 2023-10-13 | End: 2023-11-21

## 2023-10-13 RX ORDER — SODIUM CHLORIDE 9 MG/ML
1000 INJECTION, SOLUTION INTRAVENOUS
Refills: 0 | Status: DISCONTINUED | OUTPATIENT
Start: 2023-10-13 | End: 2023-10-30

## 2023-10-13 RX ORDER — INSULIN LISPRO 100/ML
20 VIAL (ML) SUBCUTANEOUS
Refills: 0 | Status: DISCONTINUED | OUTPATIENT
Start: 2023-10-13 | End: 2023-10-15

## 2023-10-13 RX ORDER — ALBUTEROL 90 UG/1
2 AEROSOL, METERED ORAL EVERY 6 HOURS
Refills: 0 | Status: DISCONTINUED | OUTPATIENT
Start: 2023-10-13 | End: 2023-10-28

## 2023-10-13 RX ORDER — ACETAMINOPHEN 500 MG
650 TABLET ORAL EVERY 6 HOURS
Refills: 0 | Status: DISCONTINUED | OUTPATIENT
Start: 2023-10-13 | End: 2023-10-28

## 2023-10-13 RX ORDER — CEFEPIME 1 G/1
INJECTION, POWDER, FOR SOLUTION INTRAMUSCULAR; INTRAVENOUS
Refills: 0 | Status: COMPLETED | OUTPATIENT
Start: 2023-10-13 | End: 2023-10-14

## 2023-10-13 RX ORDER — GLUCAGON INJECTION, SOLUTION 0.5 MG/.1ML
1 INJECTION, SOLUTION SUBCUTANEOUS ONCE
Refills: 0 | Status: DISCONTINUED | OUTPATIENT
Start: 2023-10-13 | End: 2023-10-28

## 2023-10-13 RX ORDER — HEPARIN SODIUM 5000 [USP'U]/ML
5000 INJECTION INTRAVENOUS; SUBCUTANEOUS EVERY 8 HOURS
Refills: 0 | Status: DISCONTINUED | OUTPATIENT
Start: 2023-10-14 | End: 2023-10-17

## 2023-10-13 RX ORDER — DEXTROSE 50 % IN WATER 50 %
25 SYRINGE (ML) INTRAVENOUS ONCE
Refills: 0 | Status: DISCONTINUED | OUTPATIENT
Start: 2023-10-13 | End: 2023-10-28

## 2023-10-13 RX ORDER — TAMSULOSIN HYDROCHLORIDE 0.4 MG/1
0.4 CAPSULE ORAL AT BEDTIME
Refills: 0 | Status: DISCONTINUED | OUTPATIENT
Start: 2023-10-13 | End: 2023-11-21

## 2023-10-13 RX ORDER — CEFEPIME 1 G/1
2000 INJECTION, POWDER, FOR SOLUTION INTRAMUSCULAR; INTRAVENOUS EVERY 12 HOURS
Refills: 0 | Status: COMPLETED | OUTPATIENT
Start: 2023-10-14 | End: 2023-10-14

## 2023-10-13 RX ORDER — PANTOPRAZOLE SODIUM 20 MG/1
40 TABLET, DELAYED RELEASE ORAL
Refills: 0 | Status: DISCONTINUED | OUTPATIENT
Start: 2023-10-13 | End: 2023-11-21

## 2023-10-13 RX ORDER — CEFEPIME 1 G/1
2000 INJECTION, POWDER, FOR SOLUTION INTRAMUSCULAR; INTRAVENOUS ONCE
Refills: 0 | Status: COMPLETED | OUTPATIENT
Start: 2023-10-13 | End: 2023-10-13

## 2023-10-13 RX ORDER — INSULIN LISPRO 100/ML
VIAL (ML) SUBCUTANEOUS AT BEDTIME
Refills: 0 | Status: DISCONTINUED | OUTPATIENT
Start: 2023-10-13 | End: 2023-11-21

## 2023-10-13 RX ORDER — INSULIN GLARGINE 100 [IU]/ML
40 INJECTION, SOLUTION SUBCUTANEOUS AT BEDTIME
Refills: 0 | Status: DISCONTINUED | OUTPATIENT
Start: 2023-10-13 | End: 2023-10-15

## 2023-10-13 RX ORDER — DEXTROSE 50 % IN WATER 50 %
15 SYRINGE (ML) INTRAVENOUS ONCE
Refills: 0 | Status: DISCONTINUED | OUTPATIENT
Start: 2023-10-13 | End: 2023-10-28

## 2023-10-13 RX ORDER — ASPIRIN/CALCIUM CARB/MAGNESIUM 324 MG
81 TABLET ORAL DAILY
Refills: 0 | Status: DISCONTINUED | OUTPATIENT
Start: 2023-10-14 | End: 2023-11-21

## 2023-10-13 RX ADMIN — PANTOPRAZOLE SODIUM 40 MILLIGRAM(S): 20 TABLET, DELAYED RELEASE ORAL at 06:18

## 2023-10-13 RX ADMIN — CHLORHEXIDINE GLUCONATE 1 APPLICATION(S): 213 SOLUTION TOPICAL at 06:18

## 2023-10-13 RX ADMIN — CEFEPIME 100 MILLIGRAM(S): 1 INJECTION, POWDER, FOR SOLUTION INTRAMUSCULAR; INTRAVENOUS at 06:20

## 2023-10-13 RX ADMIN — TAMSULOSIN HYDROCHLORIDE 0.4 MILLIGRAM(S): 0.4 CAPSULE ORAL at 22:22

## 2023-10-13 RX ADMIN — INSULIN GLARGINE 40 UNIT(S): 100 INJECTION, SOLUTION SUBCUTANEOUS at 23:08

## 2023-10-13 RX ADMIN — Medication 2: at 11:37

## 2023-10-13 RX ADMIN — Medication 25 MILLIGRAM(S): at 06:19

## 2023-10-13 RX ADMIN — CEFEPIME 100 MILLIGRAM(S): 1 INJECTION, POWDER, FOR SOLUTION INTRAMUSCULAR; INTRAVENOUS at 22:23

## 2023-10-13 RX ADMIN — Medication 1: at 23:09

## 2023-10-13 RX ADMIN — Medication 20 UNIT(S): at 09:23

## 2023-10-13 RX ADMIN — BUMETANIDE 2 MILLIGRAM(S): 0.25 INJECTION INTRAMUSCULAR; INTRAVENOUS at 06:18

## 2023-10-13 RX ADMIN — CLOPIDOGREL BISULFATE 75 MILLIGRAM(S): 75 TABLET, FILM COATED ORAL at 11:46

## 2023-10-13 RX ADMIN — ENOXAPARIN SODIUM 40 MILLIGRAM(S): 100 INJECTION SUBCUTANEOUS at 06:18

## 2023-10-13 RX ADMIN — Medication 81 MILLIGRAM(S): at 11:46

## 2023-10-13 RX ADMIN — Medication 20 UNIT(S): at 11:38

## 2023-10-13 RX ADMIN — Medication 2: at 09:22

## 2023-10-13 NOTE — H&P ADULT - HISTORY OF PRESENT ILLNESS
Patient with separate chart from [facility], MRN#     59 yo Male pmhx CVA with mild left sided deficits, HTN, DM2, vertigo, HLD, Mobitz II s/pp Medtronic dual chamber ICD (12/21/22) initially presented to [facility] from home with complaints of dyspnea and chest pain and was admitted w/ acute hypoxic respiratory failure likely due to acute pulmonary edema due to acute HFrEF in setting of acute NSTEMI, LAURA and enterovirus infection. Pt with brief MICU stay at [facility] requiring bipap (no intubation), was treated for pna with cefepime, and was found to have TTE done 9/23/23 showing EF 20% with severely decreased LVSF, multiple regional wall motion abnormalities, and elevated LV end diastolic pressure. Pt was transferred to [facility] for cardiac cath evaluation.  Patient with separate chart from [facility], MRN# [id number]    59 yo Male pmhx CVA with mild left sided deficits, HTN, DM2, vertigo, HLD, Mobitz II s/pp Medtronic dual chamber ICD (12/21/22) initially presented to [facility] from home with complaints of dyspnea and chest pain and was admitted w/ acute hypoxic respiratory failure likely due to acute pulmonary edema due to acute HFrEF in setting of acute NSTEMI, LAURA and enterovirus infection. Pt with brief MICU stay at [facility] requiring bipap (no intubation), was treated for pna with cefepime, and was found to have TTE done 9/23/23 showing EF 20% with severely decreased LVSF, multiple regional wall motion abnormalities, and elevated LV end diastolic pressure. Pt was transferred to [facility] for cardiac cath evaluation.  Patient with separate chart from [facility], MRN# [id number]    59 yo Male pmhx CVA with mild left sided deficits, HTN, DM2, vertigo, HLD, Mobitz II s/pp Medtronic dual chamber ICD (12/21/22) initially presented to [facility] from home with complaints of dyspnea and chest pain and was admitted w/ acute hypoxic respiratory failure likely due to acute pulmonary edema due to acute HFrEF in setting of acute NSTEMI, LAURA and enterovirus infection. Pt with brief MICU stay at [facility] requiring bipap (no intubation), was treated for PNA with cefepime, and was found to have TTE done 9/23/23 showing EF 20% with severely decreased LVSF, multiple regional wall motion abnormalities, and elevated LV end diastolic pressure. Pt was transferred to [facility] for cardiac cath evaluation. Patient without any acute complaints, complaining of intermittent palpitations for the last 2 days. No chest pain, SOB, fevers, nausea, vomiting, abdominal pain.  Patient with separate chart from [facility], MRN# [id number]    59 yo Male pmhx CVA with mild left sided deficits, HTN, DM2, vertigo, HLD, Mobitz II s/pp Medtronic dual chamber ICD (12/21/22) initially presented to [facility] from home with complaints of dyspnea and chest pain and was admitted w/ acute hypoxic respiratory failure likely due to acute pulmonary edema due to acute HFrEF in setting of acute NSTEMI, LAURA and enterovirus infection. Pt with brief MICU stay at [facility] requiring bipap (no intubation), was treated for PNA with cefepime, and was found to have TTE done 9/26/23 showing EF 20% with severely decreased LVSF, multiple regional wall motion abnormalities, and elevated LV end diastolic pressure. Pt was transferred to [facility] for cardiac cath evaluation. Patient without any acute complaints, complaining of intermittent palpitations for the last 2 days. No chest pain, SOB, fevers, nausea, vomiting, abdominal pain.

## 2023-10-13 NOTE — CHART NOTE - NSCHARTNOTEFT_GEN_A_CORE
Pt seen on medical floor for follow-up, adm w/ CHF exacerbation; acute hypoxic respiratory failure due to acute sys / diastolic CHF ; NSTEM ; Gram negative community Acquired PNA; urinary retention ; LAURA ; CKD stage 2 ; HTN ( stable) ; DM2 ; thyroid goiter.    Factors impacting intake: [x ] none [ ] nausea  [ ] vomiting [ ] diarrhea [ ] constipation  [ ]chewing problems [ ] swallowing issues  [ ] other:     Diet Prescription: Diet, DASH/TLC:   Sodium & Cholesterol Restricted  Consistent Carbohydrate {Evening Snack}  Halal (09-27-23 @ 13:02)    Intake: 75 % meals. Denies N/V/D/C/Chewing/Swallowing issues.     Current Weight: 10/13 - 158.9 (72.1 kg) - Edema - 10/12 - 1+ (L & R) Foot ; 10/6 - 151 (68.5 kg)   % Weight Change - 4.9 % / 3.6 kg gain x 7 days    Edema - 10/12 - 1+ ( L & R) Foot      Pertinent Medications: MEDICATIONS  (STANDING):  aspirin enteric coated 81 milliGRAM(s) Oral daily  atorvastatin 80 milliGRAM(s) Oral at bedtime  buMETAnide 2 milliGRAM(s) Oral daily  cefepime   IVPB 2000 milliGRAM(s) IV Intermittent every 12 hours  cefepime   IVPB      chlorhexidine 2% Cloths 1 Application(s) Topical <User Schedule>  clopidogrel Tablet 75 milliGRAM(s) Oral daily  enoxaparin Injectable 40 milliGRAM(s) SubCutaneous every 24 hours  influenza   Vaccine 0.5 milliLiter(s) IntraMuscular once  insulin glargine Injectable (LANTUS) 40 Unit(s) SubCutaneous at bedtime  insulin lispro (ADMELOG) corrective regimen sliding scale   SubCutaneous at bedtime  insulin lispro (ADMELOG) corrective regimen sliding scale   SubCutaneous three times a day before meals  insulin lispro Injectable (ADMELOG) 20 Unit(s) SubCutaneous three times a day before meals  metoprolol succinate ER 25 milliGRAM(s) Oral daily  pantoprazole    Tablet 40 milliGRAM(s) Oral before breakfast  tamsulosin 0.4 milliGRAM(s) Oral at bedtime    MEDICATIONS  (PRN):  acetaminophen     Tablet .. 650 milliGRAM(s) Oral every 6 hours PRN Temp greater or equal to 38C (100.4F), Mild Pain (1 - 3)  albuterol    0.083% 2.5 milliGRAM(s) Nebulizer every 6 hours PRN Shortness of Breath and/or Wheezing  dextrose Oral Gel 15 Gram(s) Oral once PRN Blood Glucose LESS THAN 70 milliGRAM(s)/deciliter  loperamide 2 milliGRAM(s) Oral daily PRN if >3 watery BMs per day    Pertinent Labs: 10-13 Na136 mmol/L Glu 176 mg/dL<H> K+ 3.8 mmol/L Cr  1.43 mg/dL<H> BUN 30 mg/dL<H> 10-13 Phos 3.3 mg/dL 09-27 Chol 198 mg/dL LDL --    HDL 66 mg/dL Trig 90 mg/kQ84-55-70 @ 02:40 A1C 6.9       CAPILLARY BLOOD GLUCOSE      POCT Blood Glucose.: 176 mg/dL (13 Oct 2023 11:08)  POCT Blood Glucose.: 155 mg/dL (13 Oct 2023 09:17)  POCT Blood Glucose.: 169 mg/dL (12 Oct 2023 21:42)  POCT Blood Glucose.: 164 mg/dL (12 Oct 2023 16:18)    Skin: WDL    Estimated Needs:   [x ] no change since previous assessment ( 10/6 )  [ ] recalculated:     Previous Nutrition Diagnosis:   Previous Nutrition Diagnosis: (09/29)  Malnutrition: moderate malnutrition in context of chronic illness  Etiology: increased protein-energy needs in setting of hx of CVA, cardiac dysfunction  Signs/Symptoms: physical findings of mild fat/muscle loss  Goal/Expected Outcome: pt to consume >75% of meals; met     Nutrition Diagnosis is [x ] ongoing  [ ] resolved [ ] not applicable     New Nutrition Diagnosis: [ x] not applicable       Interventions:   Recommend -> Continue current diet as Rx'd  [ ] Change Diet To:  [ ] Nutrition Supplement  [ ] Nutrition Support  [ ] Other:     Monitoring and Evaluation:   [x ] PO intake [ x ] Tolerance to diet prescription [ x ] weights [ x ] labs[ x ] follow up per protocol  [ ] other:

## 2023-10-13 NOTE — CONSULT NOTE ADULT - ASSESSMENT
59 yo Male pmhx CVA with mild left sided deficits, HTN, DM2, vertigo, HLD, Mobitz II s/pp Medtronic dual chamber ICD, newly diagnosed HFrEF (EF 20%) who presents as a transfer from [facility] for ischemic eval in setting of newly depressed EF with noted WMA.           Please see attending attestation for final recommendations  59 yo Male pmhx CVA with mild left sided deficits, HTN, DM2, vertigo, HLD, Mobitz II s/p Medtronic dual chamber ICD, newly diagnosed HFrEF (EF 20%) who presents as a transfer from [facility] after admission for ADHF/PNA now transferred for ischemic eval in setting of newly depressed EF with noted WMA.           Please see attending attestation for final recommendations  59 yo Male pmhx CVA with mild left sided deficits, HTN, DM2, vertigo, HLD, Mobitz II s/p Medtronic dual chamber ICD, newly diagnosed HFrEF (EF 20%) who presents as a transfer from [facility] after admission for ADHF/PNA now transferred for ischemic eval in setting of newly depressed EF with noted WMA.     #HFrEF (EF 20%) - compensated on exam but still volume up  - Will need up-titration of GDMT  - Continue Toprol XL 25 mg  - Ischemic eval as below      #NSTEMI  - trop peaked at 24,293, currently chest pain free and HDS.  - Continue DAPT w/ ASA/Plavix  - Plan for [facility] - patient would like his wife to be present to discuss further before consenting to procedure             Please see attending attestation for final recommendations  59 yo Male pmhx CVA with mild left sided deficits, HTN, DM2, vertigo, HLD, Mobitz II s/p Medtronic dual chamber ICD, newly diagnosed HFrEF (EF 20%) who presents as a transfer from [facility] after admission for ADHF/PNA now transferred for ischemic eval in setting of newly depressed EF with noted WMA.     #HFrEF (EF 20%) - compensated on exam but still volume up  - Will need up-titration of GDMT pending renal function. Unclear Cr baseline, LAURA at OSH has been improving on diuretics.   - Continue Toprol XL 25 mg  - Bumex 2 mg PO for now - monitor strict I&O and daily weights.   - Ischemic eval as below      #NSTEMI  - trop peaked at 24,293, currently chest pain free and HDS.  - Continue DAPT w/ ASA/Plavix. Lipitor 80 mg  - Plan for [facility] - patient would like his wife to be present to discuss further before consenting to procedure             Please see attending attestation for final recommendations

## 2023-10-13 NOTE — H&P ADULT - PROBLEM SELECTOR PLAN 1
Presenting initially with chest pain to [facility]. Troponin there went from 6791 --> 68514 --> [id number].  - EKG initially with TWI in lateral leads.   - EKG done 10/13 personally reviewed showing sinus tachycardia, widened QRS, TWI in V1-3, .   - Patient currently without symptoms of chest pain, dyspnea, or diaphoresis  - Continue ASA 81mg and Plavix 75mg qd  - S/p heparin drip x72 hrs while at [facility]  - Cards notified, planning to do cardiac cath.

## 2023-10-13 NOTE — H&P ADULT - NSHPPHYSICALEXAM_GEN_ALL_CORE
Vital Signs Last 24 Hrs  T(C): 36.9 (13 Oct 2023 20:37), Max: 36.9 (13 Oct 2023 20:37)  T(F): 98.4 (13 Oct 2023 20:37), Max: 98.4 (13 Oct 2023 20:37)  HR: 134 (13 Oct 2023 22:12) (124 - 139)  BP: 127/88 (13 Oct 2023 21:03) (123/86 - 127/88)  BP(mean): --  RR: 18 (13 Oct 2023 20:37) (18 - 18)  SpO2: 94% (13 Oct 2023 22:12) (91% - 94%)    Parameters below as of 13 Oct 2023 22:12  Patient On (Oxygen Delivery Method): nasal cannula  O2 Flow (L/min): 2      CONSTITUTIONAL: Well-groomed, in no apparent distress  EYES: No conjunctival or scleral injection, non-icteric; PERRLA and symmetric  ENMT: No external nasal lesions; nasal mucosa not inflamed; normal dentition; no pharyngeal injection or exudates, oral mucosa with moist membranes  RESPIRATORY: Breathing comfortably; lungs CTA without wheeze/rhonchi/rales  CARDIOVASCULAR: +S1S2, RRR, no M/G/R; no carotid bruits; pedal pulses full and symmetric; no lower extremity edema  GASTROINTESTINAL: No palpable masses or tenderness, +BS throughout, no rebound/guarding; no hepatosplenomegaly; no hernia palpated  GENITOURINARY:  LYMPHATIC: No cervical LAD or tenderness; no axillary LAD or tenderness; no inguinal LAD or tenderness  MUSCULOSKELETAL: Normal gait and station; no digital clubbing or cyanosis; no paraspinal tenderness; no malalignment of extremities  SKIN: No rashes or ulcers noted; no subcutaneous nodules or induration palpable  NEUROLOGIC: CN grossly intact; sensation intact in LEs b/l to light touch; normal strength and tone  PSYCHIATRIC: A+O x 3; mood and affect appropriate; appropriate insight and judgment Vital Signs Last 24 Hrs  T(C): 36.9 (13 Oct 2023 20:37), Max: 36.9 (13 Oct 2023 20:37)  T(F): 98.4 (13 Oct 2023 20:37), Max: 98.4 (13 Oct 2023 20:37)  HR: 133 (13 Oct 2023 22:29) (124 - 139)  BP: 129/90 (13 Oct 2023 22:29) (123/86 - 129/90)  BP(mean): --  RR: 20 (13 Oct 2023 22:29) (18 - 20)  SpO2: 93% (13 Oct 2023 22:29) (91% - 94%)    Parameters below as of 13 Oct 2023 22:29  Patient On (Oxygen Delivery Method): nasal cannula  O2 Flow (L/min): 2      CONSTITUTIONAL: Well-groomed, in no apparent distress  EYES: No conjunctival or scleral injection, non-icteric; PERRLA and symmetric  ENMT: No external nasal lesions; nasal mucosa not inflamed; no pharyngeal injection or exudates, oral mucosa with moist membranes  RESPIRATORY: Breathing comfortably; lungs with crackles auscultated at the bases B/L  CARDIOVASCULAR: tachycardic, +S1S2, no M/G/R; no carotid bruits; pedal pulses full and symmetric; no lower extremity edema, no JVD  GASTROINTESTINAL: No palpable masses or tenderness, +BS throughout, no rebound/guarding; no hepatosplenomegaly; no hernia palpated  LYMPHATIC: No cervical LAD or tenderness  MUSCULOSKELETAL: No paraspinal tenderness; no malalignment of extremities; no joint swelling or tenderness  SKIN: No rashes or ulcers noted;  NEUROLOGIC: CN grossly intact; sensation intact in LEs b/l to light touch; normal strength and tone  PSYCHIATRIC: A+O x 3; mood and affect appropriate; appropriate insight and judgment

## 2023-10-13 NOTE — PROGRESS NOTE ADULT - SUBJECTIVE AND OBJECTIVE BOX
Patient is a 58y old  Male who presents with CHF Exacerbation (12 Oct 2023 07:57)    PAST MEDICAL & SURGICAL HISTORY:  CVA (cerebral vascular accident)    HTN (hypertension)    DM (diabetes mellitus)    S/P hernia repair  right inguinal hernia 1992    INTERVAL HISTORY: in no distress, denies any chest pain or sob   	  MEDICATIONS:  MEDICATIONS  (STANDING):  aspirin enteric coated 81 milliGRAM(s) Oral daily  atorvastatin 80 milliGRAM(s) Oral at bedtime  buMETAnide 2 milliGRAM(s) Oral daily  cefepime   IVPB 2000 milliGRAM(s) IV Intermittent every 12 hours  chlorhexidine 2% Cloths 1 Application(s) Topical <User Schedule>  clopidogrel Tablet 75 milliGRAM(s) Oral daily  enoxaparin Injectable 40 milliGRAM(s) SubCutaneous every 24 hours  influenza   Vaccine 0.5 milliLiter(s) IntraMuscular once  insulin glargine Injectable (LANTUS) 40 Unit(s) SubCutaneous at bedtime  insulin lispro (ADMELOG) corrective regimen sliding scale   SubCutaneous three times a day before meals  insulin lispro (ADMELOG) corrective regimen sliding scale   SubCutaneous at bedtime  insulin lispro Injectable (ADMELOG) 20 Unit(s) SubCutaneous three times a day before meals  metoprolol succinate ER 25 milliGRAM(s) Oral daily  pantoprazole    Tablet 40 milliGRAM(s) Oral before breakfast  tamsulosin 0.4 milliGRAM(s) Oral at bedtime    MEDICATIONS  (PRN):  acetaminophen     Tablet .. 650 milliGRAM(s) Oral every 6 hours PRN Temp greater or equal to 38C (100.4F), Mild Pain (1 - 3)  albuterol    0.083% 2.5 milliGRAM(s) Nebulizer every 6 hours PRN Shortness of Breath and/or Wheezing  dextrose Oral Gel 15 Gram(s) Oral once PRN Blood Glucose LESS THAN 70 milliGRAM(s)/deciliter  loperamide 2 milliGRAM(s) Oral daily PRN if >3 watery BMs per day    Vitals:  T(F): 97.9 (10-12-23 @ 10:57), Max: 98.4 (10-11-23 @ 16:14)  HR: 108 (10-12-23 @ 10:57) (106 - 108)  BP: 110/75 (10-12-23 @ 10:57) (106/74 - 112/76)  RR: 18 (10-12-23 @ 10:57) (18 - 19)  SpO2: 99% (10-12-23 @ 10:57) (96% - 99%)    10-11 @ 07:01  -  10-12 @ 07:00  --------------------------------------------------------  IN:    Oral Fluid: 340 mL  Total IN: 340 mL    OUT:  Total OUT: 0 mL    Total NET: 340 mL    PHYSICAL EXAM:  Neuro: Awake, responsive  CV: S1 S2 RRR  Lungs: CTABL  GI: Soft, BS +, ND, NT  Extremities: No edema    TELEMETRY: paced    RADIOLOGY: < from: Xray Chest 2 Views PA/Lat (10.02.23 @ 19:35) >  IMPRESSION:   Moderate bilateral pleural effusions and/or posterior   basilar airspace consolidations obscuring LEFT diaphragm contour.  Please see same day chest CT report..    < end of copied text >    DIAGNOSTIC TESTING:    [x ] Echocardiogram: < from: TTE Echo Complete w/o Contrast w/ Doppler (09.26.23 @ 13:20) >  Left Ventricle: The left ventricular internal cavity size is normal.  Global LV systolic function was severely decreased. Left ventricular   ejection fraction, by visual estimation, is 20%. Spectral Doppler shows   pseudonormal pattern of left ventricular myocardial filling (Grade II   diastolic dysfunction). Elevated left ventricular end-diastolic pressure.   Global longitudinal strain using [employer] software at a HR of 121bpm and BP   137/99 is -5.1%, which is severely reduced. There is some sparing of the   basal segments.      LV Wall Scoring:  The entire apex is akinetic. The mid anteroseptal segment, mid   inferoseptal  segment, mid inferolateral segment, mid anterolateral segment, mid   anterior  segment, and mid inferior segment are hypokinetic. All remaining scored  segments are normal.    Right Ventricle: RV systolic function is moderately reduced.  Left Atrium: The left atrium is normal in size.  Right Atrium: The right atrium was not well visualized.  Pericardium: Trivial pericardial effusion is present.  Mitral Valve: Structurally normal mitral valve, with normal leaflet   excursion. There is mild mitral annular calcification.  Tricuspid Valve: Mild tricuspid regurgitation is visualized.      Summary:   1. Technically limited study.   2. Left ventricular ejection fraction, by visual estimation, is 20%.   3. Severely decreased global left ventricular systolic function.   4. Multiple left ventricular regional wall motion abnormalities exist.   See wall motion findings.   5. Elevated left ventricular end-diastolic pressure.   6. Spectral Doppler shows pseudonormal pattern of left ventricular   myocardial filling (Grade II diastolic dysfunction).   7. Global longitudinal strain using [employer]Stream[employer] software at a HR of 121bpm and BP   137/99 is -5.1%, which is severely reduced. There is some sparing of the   basal segments.   8. Moderately reduced RVsystolic function.   9. The left atrium is normal in size.  10. Trivial pericardial effusion.  11. Mild tricuspid regurgitation.    < end of copied text >    LABS:	 	    12 Oct 2023 08:00    136    |  104    |  28     ----------------------------<  210    3.6     |  23     |  1.53   11 Oct 2023 07:10    135    |  104    |  30     ----------------------------<  217    3.5     |  25     |  1.62   10 Oct 2023 08:20    132    |  101    |  31     ----------------------------<  248    4.0     |  22     |  1.58     Ca    8.2        12 Oct 2023 08:00                        11.9   10.78 )-----------( 437      ( 11 Oct 2023 07:10 )             38.6 ,                       12.7   12.43 )-----------( 440      ( 10 Oct 2023 08:20 )             39.2              Patient is a 58y old  Male who presents with CHF Exacerbation (12 Oct 2023 07:57)    PAST MEDICAL & SURGICAL HISTORY:  CVA (cerebral vascular accident)    HTN (hypertension)    DM (diabetes mellitus)    S/P hernia repair  right inguinal hernia 1992    INTERVAL HISTORY: Seen and examined, alert and oriented in no acute distress, with non-toxic appearance. No complaints.   	  MEDICATIONS:  MEDICATIONS  (STANDING):  aspirin enteric coated 81 milliGRAM(s) Oral daily  atorvastatin 80 milliGRAM(s) Oral at bedtime  buMETAnide 2 milliGRAM(s) Oral daily  cefepime   IVPB 2000 milliGRAM(s) IV Intermittent every 12 hours  chlorhexidine 2% Cloths 1 Application(s) Topical <User Schedule>  clopidogrel Tablet 75 milliGRAM(s) Oral daily  enoxaparin Injectable 40 milliGRAM(s) SubCutaneous every 24 hours  influenza   Vaccine 0.5 milliLiter(s) IntraMuscular once  insulin glargine Injectable (LANTUS) 40 Unit(s) SubCutaneous at bedtime  insulin lispro (ADMELOG) corrective regimen sliding scale   SubCutaneous three times a day before meals  insulin lispro (ADMELOG) corrective regimen sliding scale   SubCutaneous at bedtime  insulin lispro Injectable (ADMELOG) 20 Unit(s) SubCutaneous three times a day before meals  metoprolol succinate ER 25 milliGRAM(s) Oral daily  pantoprazole    Tablet 40 milliGRAM(s) Oral before breakfast  tamsulosin 0.4 milliGRAM(s) Oral at bedtime    MEDICATIONS  (PRN):  acetaminophen     Tablet .. 650 milliGRAM(s) Oral every 6 hours PRN Temp greater or equal to 38C (100.4F), Mild Pain (1 - 3)  albuterol    0.083% 2.5 milliGRAM(s) Nebulizer every 6 hours PRN Shortness of Breath and/or Wheezing  dextrose Oral Gel 15 Gram(s) Oral once PRN Blood Glucose LESS THAN 70 milliGRAM(s)/deciliter  loperamide 2 milliGRAM(s) Oral daily PRN if >3 watery BMs per day    Vital Signs Last 24 Hrs  T(C): 36.9 (13 Oct 2023 10:40), Max: 37 (13 Oct 2023 05:08)  T(F): 98.4 (13 Oct 2023 10:40), Max: 98.6 (13 Oct 2023 05:08)  HR: 112 (13 Oct 2023 10:40) (108 - 118)  BP: 99/63 (13 Oct 2023 10:40) (99/63 - 114/73)  BP(mean): --  RR: 18 (13 Oct 2023 10:40) (18 - 18)  SpO2: 98% (13 Oct 2023 10:40) (98% - 98%)    Parameters below as of 12 Oct 2023 16:58  Patient On (Oxygen Delivery Method): nasal cannula    I&O's Detail    10-11 @ 07:01  -  10-12 @ 07:00  --------------------------------------------------------  IN:    Oral Fluid: 340 mL  Total IN: 340 mL    OUT:  Total OUT: 0 mL    Total NET: 340 mL      PHYSICAL EXAM:  General: Nontoxic-appearing Male in no acute distress. NC  Lungs: diminished breath sounds b/l at the bases, no wheezes, no rhonchi   Heart: Regular rate and rhythm. No Murmur. No rub. No gallop. No palpable thrill.   Abdomen: Bowel sounds present and normoactive. Soft. Nondistended. Nontender. No sense of mass. No organomegaly.  Back: No spinal tenderness. No costovertebral angle tenderness.   Extremities: No cyanosis or clubbing. No edema.   Skin: Warm. Dry. Good turgor.   Neuro: Non-focal exam       TELEMETRY: paced    RADIOLOGY: < from: Xray Chest 2 Views PA/Lat (10.02.23 @ 19:35) >  IMPRESSION:   Moderate bilateral pleural effusions and/or posterior   basilar airspace consolidations obscuring LEFT diaphragm contour.  Please see same day chest CT report..    < end of copied text >    DIAGNOSTIC TESTING:    [x ] Echocardiogram: < from: TTE Echo Complete w/o Contrast w/ Doppler (09.26.23 @ 13:20) >  Left Ventricle: The left ventricular internal cavity size is normal.  Global LV systolic function was severely decreased. Left ventricular   ejection fraction, by visual estimation, is 20%. Spectral Doppler shows   pseudonormal pattern of left ventricular myocardial filling (Grade II   diastolic dysfunction). Elevated left ventricular end-diastolic pressure.   Global longitudinal strain using GE software at a HR of 121bpm and BP   137/99 is -5.1%, which is severely reduced. There is some sparing of the   basal segments.      LV Wall Scoring:  The entire apex is akinetic. The mid anteroseptal segment, mid   inferoseptal  segment, mid inferolateral segment, mid anterolateral segment, mid   anterior  segment, and mid inferior segment are hypokinetic. All remaining scored  segments are normal.    Right Ventricle: RV systolic function is moderately reduced.  Left Atrium: The left atrium is normal in size.  Right Atrium: The right atrium was not well visualized.  Pericardium: Trivial pericardial effusion is present.  Mitral Valve: Structurally normal mitral valve, with normal leaflet   excursion. There is mild mitral annular calcification.  Tricuspid Valve: Mild tricuspid regurgitation is visualized.      Summary:   1. Technically limited study.   2. Left ventricular ejection fraction, by visual estimation, is 20%.   3. Severely decreased global left ventricular systolic function.   4. Multiple left ventricular regional wall motion abnormalities exist.   See wall motion findings.   5. Elevated left ventricular end-diastolic pressure.   6. Spectral Doppler shows pseudonormal pattern of left ventricular   myocardial filling (Grade II diastolic dysfunction).   7. Global longitudinal strain using GE software at a HR of 121bpm and BP   137/99 is -5.1%, which is severely reduced. There is some sparing of the   basal segments.   8. Moderately reduced RVsystolic function.   9. The left atrium is normal in size.  10. Trivial pericardial effusion.  11. Mild tricuspid regurgitation.    < end of copied text >    LABS:	    10-13    136  |  104  |  30<H>  ----------------------------<  176<H>  3.8   |  26  |  1.43<H>    Ca    7.9<L>      13 Oct 2023 05:27  Phos  3.3     10-13  Mg     2.3     10-13    12 Oct 2023 08:00    136    |  104    |  28     ----------------------------<  210    3.6     |  23     |  1.53   11 Oct 2023 07:10    135    |  104    |  30     ----------------------------<  217    3.5     |  25     |  1.62   10 Oct 2023 08:20    132    |  101    |  31     ----------------------------<  248    4.0     |  22     |  1.58     Ca    8.2        12 Oct 2023 08:00                          10.9   8.65  )-----------( 359      ( 13 Oct 2023 05:27 )             34.5                         11.9   10.78 )-----------( 437      ( 11 Oct 2023 07:10 )             38.6 ,                       12.7   12.43 )-----------( 440      ( 10 Oct 2023 08:20 )             39.2

## 2023-10-13 NOTE — H&P ADULT - PROBLEM SELECTOR PLAN 7
DVT ppx: lovenox 40mg qd  Diet: CC, DASH, Halal  Dispo: pending  Code: Full DVT ppx: heparin subq  Diet: CC, DASH, Halal  Dispo: pending  Code: Full

## 2023-10-13 NOTE — PROGRESS NOTE ADULT - ASSESSMENT
57 yo m pmhx CVA with mild left sided deficits, HTN, DM2, vertigo, HLD, Mobitz II s/pp Medtronic dual chamber ICD (12/21/22) biba from home with complaints of dyspnea and chest pain and was admitted w/ acute hypoxic respiratory failure likely due to acute pulmonary edema due to acute HFrEF in setting of acute NSTEMI, LAURA and enterovirus infection.     Acute hypoxic respiratory failure due to acute sys/ diastolic CHF  - CT on admission showed patchy and confluent ground-glass and consolidative opacities primarily in the bilateral upper and lower lobes c/w a combination of infection and edema and small bilateral pleural effusions  - patient has improved w/ Lasix and Bipap, now on NC  - Echo showed EF 20% w/ severely decreased global left ventricular systolic function w/ multiple left ventricular regional wall motion abnormalities & grade II diastolic dysfunction.   - c/w Bumex &   metoprolol     NSTEMI  - trop peaked at 24,293  - as per cardio, will likely need transfer for cardiac catheterization, once infectious symptoms resolve  - c/w ASA, Plavix & Lipitor  - heparin gtt was stopped in ICU     Gram Negative Community Acquired Pneumonia   - likely due to PNA as CT showed hazy groundglass opacities in the upper lobes w/ mild to moderate bilateral pleural effusions with overlying compressive atelectasis  - s/p Ceftriaxone in ICU    - as per ID, stopped vanc stopped  - Sputum + pseudomonas   - BCx NGTD  - c/w Cefepime for total of 14 days,  needs 2 more days   -try to n wean off NC as tolerated    Urinary retention   - Douglass placed 9/30 after multiple straight caths  - urology following   -per my colleague's documentation " passed TOV 10/6 , good urine output"    LAURA-    on ckd stage 2  - ATN vs cardiorenal  - Cr slighly better       HTN   bp stable  DM2   - c/w Lantus, mealtime lispro and ISS,    - hgb A1C is 6.9  - endo following  10/12/2023 incerased lantis 40 units qhs  10/13/2023 fs this am is better    Thyroid goiter   -per my colleague's documentation" CT showed goiter,  3.3 x 2.8 cm lesion in the  superior mediastinum compatible with substernal extension of thyroid  goiter, unchanged compared to 12/27/2022  - TSH is 0.258 but free T4 is 2, repeat tsh wnl  - will need outpatient follow-up "    Dispo: will need transfer to [facility] for cardiac cath case d/w medicine attending dr. adle hatch who is accepting   10/13/2023 f/u montserrat two days no bed avail at [facility] already reached out thsi morning as well   PT: REJI   updated wife at bedside her number is [phone] trista monsivais 57 yo m pmhx CVA with mild left sided deficits, HTN, DM2, vertigo, HLD, Mobitz II s/pp Medtronic dual chamber ICD (12/21/22) biba from home with complaints of dyspnea and chest pain and was admitted w/ acute hypoxic respiratory failure likely due to acute pulmonary edema due to acute HFrEF in setting of acute NSTEMI, LAURA and enterovirus infection.     Acute hypoxic respiratory failure due to acute sys/ diastolic CHF  - CT on admission showed patchy and confluent ground-glass and consolidative opacities primarily in the bilateral upper and lower lobes c/w a combination of infection and edema and small bilateral pleural effusions  - patient has improved w/ Lasix and Bipap, now on NC  - Echo showed EF 20% w/ severely decreased global left ventricular systolic function w/ multiple left ventricular regional wall motion abnormalities & grade II diastolic dysfunction.   - c/w Bumex &   metoprolol     NSTEMI  - trop peaked at 24,293  - as per cardio, will likely need transfer for cardiac catheterization, once infectious symptoms resolve  - c/w ASA, Plavix & Lipitor  - heparin gtt was stopped in ICU     Gram Negative Community Acquired Pneumonia   - likely due to PNA as CT showed hazy groundglass opacities in the upper lobes w/ mild to moderate bilateral pleural effusions with overlying compressive atelectasis  - s/p Ceftriaxone in ICU    - as per ID, stopped vanc stopped  - Sputum + pseudomonas   - BCx NGTD  - c/w Cefepime for total of 14 days,  needs 1 more day  -try to n wean off NC as tolerated    Urinary retention   - Douglass placed 9/30 after multiple straight caths  - urology following   -per my colleague's documentation " passed TOV 10/6 , good urine output"    LAURA-    on ckd stage 2  - ATN vs cardiorenal  - Cr slighly better       HTN   bp stable  DM2   - c/w Lantus, mealtime lispro and ISS,    - hgb A1C is 6.9  - endo following  10/12/2023 incerased lantis 40 units qhs  10/13/2023 fs this am is better    Thyroid goiter   -per my colleague's documentation" CT showed goiter,  3.3 x 2.8 cm lesion in the  superior mediastinum compatible with substernal extension of thyroid  goiter, unchanged compared to 12/27/2022  - TSH is 0.258 but free T4 is 2, repeat tsh wnl  - will need outpatient follow-up "    Dispo: will need transfer to [facility] for cardiac cath case d/w medicine attending dr. dale hatch who is accepting   10/13/2023 f/u montserrat two days no bed avail at [facility] already reached out thsi morning as well   PT: REJI   updated wife at bedside her number is [phone] trista monsivais

## 2023-10-13 NOTE — DISCHARGE NOTE NURSING/CASE MANAGEMENT/SOCIAL WORK - NSDCPEFALRISK_GEN_ALL_CORE
For information on Fall & Injury Prevention, visit: [email or web address] OR  [email or web address] OR  https://www.cdc.gov/steadi/patient.html

## 2023-10-13 NOTE — H&P ADULT - PROBLEM SELECTOR PLAN 3
Initially found to be hypoxic requiring bipap prn at [facility]. Found to be entero rhinovirus positive.  - Continuous pulse oximetry  - Was receiving 14-day cefepime course at [facility], ends on 10/14/23. Will continue one more day of cefepime to finish course.  - CT scan findings reviewed showing B/L opacities concerning for edema vs infection.  - incentive spirometry as tolerated

## 2023-10-13 NOTE — H&P ADULT - NSHPREVIEWOFSYSTEMS_GEN_ALL_CORE
Review of Systems:   CONSTITUTIONAL: No fever, weight loss  EYES: No eye pain, visual disturbances, or discharge  ENMT:  No difficulty hearing, tinnitus, vertigo; No sinus or throat pain  RESPIRATORY: No SOB. No cough, wheezing, chills or hemoptysis  CARDIOVASCULAR: No chest pain, palpitations, dizziness, or leg swelling  GASTROINTESTINAL: No abdominal or epigastric pain. No nausea, vomiting, or hematemesis; No diarrhea or constipation. No melena or hematochezia.  GENITOURINARY: No dysuria, frequency, hematuria, or incontinence  NEUROLOGICAL: No headaches, memory loss, loss of strength, numbness, or tremors  SKIN: No itching, burning, rashes, or lesions   LYMPH NODES: No enlarged glands  ENDOCRINE: No heat or cold intolerance; No hair loss  MUSCULOSKELETAL: No joint pain or swelling; No muscle, back pain  PSYCHIATRIC: No depression, anxiety, mood swings, or difficulty sleeping  HEME/LYMPH: No easy bruising, or bleeding gums Review of Systems:   CONSTITUTIONAL: No fever, weight loss  EYES: No eye pain, visual disturbances, or discharge  ENMT:  No difficulty hearing, tinnitus, vertigo; No sinus or throat pain  RESPIRATORY: No SOB. No cough, wheezing, chills or hemoptysis  CARDIOVASCULAR: No chest pain, + palpitations, dizziness, no leg swelling  GASTROINTESTINAL: No abdominal or epigastric pain. No nausea, vomiting, or hematemesis; No diarrhea or constipation. No melena or hematochezia.  GENITOURINARY: No dysuria, frequency, hematuria, or incontinence  NEUROLOGICAL: No headaches, memory loss, loss of strength, numbness, or tremors  SKIN: No itching, burning, rashes, or lesions   LYMPH NODES: No enlarged glands  MUSCULOSKELETAL: No joint pain or swelling; No muscle, back pain  HEME/LYMPH: No easy bruising, or bleeding gums

## 2023-10-13 NOTE — PROGRESS NOTE ADULT - ASSESSMENT
59 yo m pmhx CVA with mild left sided deficits, HTN, DM2, vertigo, HLD, Mobitz II s/pp Medtronic dual chamber ICD (12/21/22) biba from home with complaints of dyspnea and chest pain and was admitted w/ acute hypoxic respiratory failure.  Found to have fever with +enterovirus infection.   Trop peaked at [id number]  Echo: 9/26/23 LVEF 20%. +WMA. Grade 2 DD. Mod reduced RV function   Echo: 12/20/22 Mild mitral regurgitation. Normal left ventricular systolic function.     1) Acute Hypoxic Respiratory Failure 2ndary Acute HFrEF and +entero/rhinovirus  2) NSTEMI   3) New Cardiomyopathy (LVEF 20%)     - patient appears comfortable, no chest pain, SOB  - tele reviewed: paced rhythm   - patient seen at [facility] 12/2022 w/ bradycardia/ Mobitz type 2 AVB w/normal LVEF, plan was for PPM but upgraded to ICD for questionable sarcoidosis, which was then ruled out with PET scan later on   - Echo reviewed +WMA, LVEF 20%  - c/w DAPT, s/p IV heparin x 72 hrs while in ICU  - No overt fluid overload noted, currently continued on Bumex 2mg PO daily, monitor renal function and electrolytes, Creat today 1.5   -cont with Toprol XL 25 daily, unable to optimize GDMT with renal insufficiency and low BP  - c/w strict I&O, maintain negative balance, daily weight  - Antibiotics as per primary team/ID, when infection resolves and ID clears, will then arrange for transfer for cardiac cath. AS per ID pt needs total of 14 days of Cefepime ->ends on 10/14.  59 yo m pmhx CVA with mild left sided deficits, HTN, DM2, vertigo, HLD, Mobitz II s/pp Medtronic dual chamber ICD (12/21/22) biba from home with complaints of dyspnea and chest pain and was admitted w/ acute hypoxic respiratory failure. Found to have fever with +enterovirus infection.     Trop peaked at [id number]  Echo: 9/26/23 LVEF 20%. +WMA. Grade 2 DD. Mod reduced RV function   Echo: 12/20/22 Mild mitral regurgitation. Normal left ventricular systolic function.     1) Acute Hypoxic Respiratory Failure 2ndary Acute HFrEF and +entero/rhinovirus  2) NSTEMI   3) New Cardiomyopathy (LVEF 20%)     - tele reviewed: paced rhythm   - patient seen at [facility] 12/2022 w/ bradycardia/ Mobitz type 2 AVB w/normal LVEF, plan was for PPM but upgraded to ICD for questionable sarcoidosis, which was then ruled out with PET scan later on   - Echo reviewed +WMA, LVEF 20%  - c/w DAPT, s/p IV heparin x 72 hrs while in ICU  - No overt fluid overload noted, currently continued on Bumex 2mg PO daily, monitor renal function and electrolytes, Creat mildly improved today 1.43   -cont with Toprol XL 25 daily, unable to optimize GDMT with renal insufficiency and low BP  - c/w strict I&O, maintain negative balance, daily weight  - Antibiotics as per primary team/ID, when infection resolves and ID clears, will then arrange for transfer for cardiac cath. AS per ID pt needs total of 14 days of Cefepime ->ends on 10/14.  57 yo m pmhx CVA with mild left sided deficits, HTN, DM2, vertigo, HLD, Mobitz II s/pp Medtronic dual chamber ICD (12/21/22) biba from home with complaints of dyspnea and chest pain and was admitted w/ acute hypoxic respiratory failure. Found to have fever with +enterovirus infection.     Trop peaked at 84447  Echo: 9/26/23 LVEF 20%. +WMA. Grade 2 DD. Mod reduced RV function   Echo: 12/20/22 Mild mitral regurgitation. Normal left ventricular systolic function.     1) Acute Hypoxic Respiratory Failure 2ndary Acute HFrEF and +entero/rhinovirus  2) NSTEMI   3) New Cardiomyopathy (LVEF 20%)     - tele reviewed: v-paced rhythm   - patient seen at [facility] 12/2022 w/ bradycardia/ Mobitz type 2 AVB w/normal LVEF, plan was for PPM but upgraded to ICD for questionable sarcoidosis, which was then ruled out with PET scan later on   - Echo reviewed +WMA, LVEF 20%  - c/w DAPT, s/p IV heparin x 72 hrs while in ICU  - continue Bumex 2mg PO daily, monitor renal function and electrolytes, Creat mildly improved today 1.43   -cont with Toprol XL 25 daily, unable to optimize GDMT with renal insufficiency and low BP  - c/w strict I&O, maintain negative balance, daily weight  - Antibiotics as per primary team/ID, when infection resolves and ID clears, will then arrange for transfer for cardiac cath. AS per ID pt needs total of 14 days of Cefepime ->ends on 10/14.

## 2023-10-13 NOTE — PROGRESS NOTE ADULT - PROVIDER SPECIALTY LIST ADULT
Cardiology
Endocrinology
Endocrinology
Hospitalist
Hospitalist
Infectious Disease
Infectious Disease
Cardiology
Hospitalist
Hospitalist
Infectious Disease
Cardiology
Critical Care
Critical Care
Hospitalist
Hospitalist
Infectious Disease
Infectious Disease
Cardiology
Critical Care
Endocrinology
Hospitalist
Infectious Disease
Infectious Disease
Urology
Cardiology
Hospitalist
Critical Care
Endocrinology
Hospitalist

## 2023-10-13 NOTE — PROGRESS NOTE ADULT - NS ATTEND AMEND GEN_ALL_CORE FT
Attending Addendum--  Case reviewed with CIARRA Wall. Her note reviewed and modified as appropriate.   Patient personally assessed and examined.  Still with intermittent low grade fever  No recent imaging  Repeat CXR?  Role of Abx TBD  Dr. Arita will be assuming care of my patients as of the end of the day today, 10/9/23. Please reach out to him or the on call person with any issues.  Khoa Cornejo MD  Attending Physician  [facility]  Division of Infectious Diseases  [phone]
Attending Addendum--  Case reviewed with CIARRA Wall. Her note reviewed and modified as appropriate.   Still febrile, though trend has been better  CT's as above  d/w Dr. Eva Cornejo MD  Attending Physician  [facility]  Division of Infectious Diseases  [phone]
Stable heart failure reduced EF continue Toprol and diuresis.  Eventual cardiac work-up when acute infectious issues improved.
Attending Addendum--  Case reviewed with CIARRA Wall. Her note reviewed and modified as appropriate.   As above  Fever curve improved  Cont abx  .jteasm
Attending Addendum--  Case reviewed with CIARRA Wall. Her note reviewed and modified as appropriate.   Still with fever but magnitude and frequency have improved.  Continue cefepime for now  follow temp curve  Khoa Cornejo MD  Attending Physician  [facility]  Division of Infectious Diseases  [phone]
Continue diuresis.  Eventual ischemic cardiac Assessment after infectious disease resolves.
I have reviewed all pertinent clinical information and agree with the NP's note.   would limit antibiotics to 14 days total. Pending to tertiary facility for cardiac cath.   monitor for fevers     Trae Arita, DO  Infectious Disease Attending  Reachable via Microsoft Teams or ID office: [phone]  After 5pm/weekends please call [phone] for all inquiries and new consults
Antibiotics as per primary team, when infection resolves and cultures negative, will then arrange for transfer for cardiac cath.
Attending Addendum--  Case reviewed with CIARRA Wall. Her note reviewed and modified as appropriate.   Patient personally assessed and examined.  CT's personally reviewed  Perhaps some moderation in fever curve  Continue cefepime- too early to expect complete response to rx vs bronchiectasis/pneumonia  Khoa Cornejo MD  Attending Physician  [facility]  Division of Infectious Diseases  [phone]
Cath after fevers resolve and ID clearance.  Meds reviewed, GDMT limited with renal insufficiency.

## 2023-10-13 NOTE — H&P ADULT - PROBLEM SELECTOR PLAN 2
TTE done 9/26 showing EF 20% with severely decreased LVSF, multiple wall motion abnormalities, elevated LV end diastolic pressure.   - Findings concerning for ischemic insult  - Does not appear severely volume overloaded or decompensated   - Will hold off bumex 2mg PO qd (getting while at [facility]) given tachycardia  - labs from earlier today reviewed; no significant electrolyte abnormalities. Maintain K>4, Mg >2,Phos>3  - Strict I/Os, daily weights  - Continue metoprolol succinate 25mg qd   - F/u cards recs and plan for cardiac cath

## 2023-10-13 NOTE — PROGRESS NOTE ADULT - NS ATTEND OPT1 GEN_ALL_CORE
I independently performed the documented:
I attest my time as attending is greater than 50% of the total combined time spent on qualifying patient care activities by the PA/NP and attending.
I independently performed the documented:
I attest my time as attending is greater than 50% of the total combined time spent on qualifying patient care activities by the PA/NP and attending.
I independently performed the documented:
I independently performed the documented:
I attest my time as attending is greater than 50% of the total combined time spent on qualifying patient care activities by the PA/NP and attending.
I independently performed the documented:

## 2023-10-13 NOTE — H&P ADULT - NSHPSOCIALHISTORY_GEN_ALL_CORE
Social History:    Marital Status:   Occupation:   Lives with:    Substance Use (street drugs):   Tobacco Usage:   Alcohol Usage: Social History:    Marital Status:   Occupation:   Lives with: wife    Substance Use (street drugs): none  Tobacco Usage: denies  Alcohol Usage: denies

## 2023-10-13 NOTE — H&P ADULT - ASSESSMENT
57 yo Male pmhx CVA with mild left sided deficits, HTN, DM2, vertigo, HLD, Mobitz II s/pp Medtronic dual chamber ICD (12/21/22) initially presented to [facility] from home with complaints of dyspnea and chest pain and was admitted w/ acute hypoxic respiratory failure. Pt found to have new cardiomyopathy, LAURA, and positive for enterorhinovirus. Pt transferred to [facility] for cardiac cath evaluation.

## 2023-10-13 NOTE — H&P ADULT - PROBLEM SELECTOR PLAN 4
Scr elevated on presentation at [facility] 1.4, which increased to 1.6. Now currently 1.43  - Monitor I/Os, trend SCr, BMP  - Avoid nephrotoxic medications and renally dose meds  - Will hold off bumex for now

## 2023-10-13 NOTE — CONSULT NOTE ADULT - SUBJECTIVE AND OBJECTIVE BOX
Patient seen and evaluated at bedside      HPI:  Patient with separate chart from [facility], MRN# [id number]    57 yo Male pmhx CVA with mild left sided deficits, HTN, DM2, vertigo, HLD, Mobitz II s/pp Medtronic dual chamber ICD (12/21/22) initially presented to [facility] from home with complaints of dyspnea and chest pain and was admitted w/ acute hypoxic respiratory failure thought 2/2 ADHF vs PNA c/b enterovirus. Pt with brief MICU stay at [facility] requiring bipap (no intubation), was treated for pna with cefepime, and was found to have a newly depressed EF to 20% with multiple regional wall motion abnormalities. Pt was transferred to [facility] for ischemic eval.        Allergies:  No Known Allergies      Home Meds:    Current Medications:   cefepime   IVPB 2000 milliGRAM(s) IV Intermittent once  cefepime   IVPB      dextrose 5%. 1000 milliLiter(s) IV Continuous <Continuous>  dextrose 5%. 1000 milliLiter(s) IV Continuous <Continuous>  dextrose 50% Injectable 12.5 Gram(s) IV Push once  dextrose 50% Injectable 25 Gram(s) IV Push once  dextrose 50% Injectable 25 Gram(s) IV Push once  dextrose Oral Gel 15 Gram(s) Oral once PRN  glucagon  Injectable 1 milliGRAM(s) IntraMuscular once  insulin glargine Injectable (LANTUS) 40 Unit(s) SubCutaneous at bedtime  insulin lispro (ADMELOG) corrective regimen sliding scale   SubCutaneous at bedtime  insulin lispro (ADMELOG) corrective regimen sliding scale   SubCutaneous three times a day before meals  insulin lispro Injectable (ADMELOG) 20 Unit(s) SubCutaneous three times a day before meals  pantoprazole    Tablet 40 milliGRAM(s) Oral before breakfast  tamsulosin 0.4 milliGRAM(s) Oral at bedtime      REVIEW OF SYSTEMS:  All other review of systems is negative unless indicated above.    Physical Exam:  T(F): 98.4 (10-13), Max: 98.4 (10-13)  HR: 134 (10-13) (124 - 139)  BP: 127/88 (10-13) (123/86 - 127/88)  RR: 18 (10-13)  SpO2: 94% (10-13)  GENERAL: No acute distress, well-developed  HEAD:  Atraumatic, Normocephalic  ENT: EOMI, PERRLA, conjunctiva and sclera clear, Neck supple, No JVD, moist mucosa  CHEST/LUNG: Clear to auscultation bilaterally; No wheeze, equal breath sounds bilaterally   BACK: No spinal tenderness  HEART: Regular rate and rhythm; No murmurs, rubs, or gallops  ABDOMEN: Soft, Nontender, Nondistended; Bowel sounds present  EXTREMITIES:  No clubbing, cyanosis, or edema  PSYCH: Nl behavior, nl affect  NEUROLOGY: AAOx3, non-focal, cranial nerves intact  SKIN: Normal color, No rashes or lesions  LINES:        ECG: Personally reviewed:    Echo:  9/6/2023  Summary:   1. Technically limited study.   2. Left ventricular ejection fraction, by visual estimation, is 20%.   3. Severely decreased global left ventricular systolic function.   4. Multiple left ventricular regional wall motion abnormalities exist.  See wall motion findings.   5. Elevated left ventricular end-diastolic pressure.   6. Spectral Doppler shows pseudonormal pattern of left ventricular  myocardial filling (Grade II diastolic dysfunction).   7. Global longitudinal strain using [employer] software at a HR of 121bpm and BP  137/99 is -5.1%, which is severely reduced. There is some sparing of the  basal segments.   8. Moderately reduced RVsystolic function.   9. The left atrium is normal in size.  10. Trivial pericardial effusion.  11. Mild tricuspid regurgitation.    LV Wall Scoring:  The entire apex is akinetic. The mid anteroseptal segment, mid  inferoseptal segment, mid inferolateral segment, mid anterolateral segment, mid  anterior segment, and mid inferior segment are hypokinetic. All remaining scored  segments are normal.      Imaging:    CXR: Personally reviewed    Labs: Personally reviewed                               Patient seen and evaluated at bedside      HPI:  Patient with separate chart from [facility], MRN# [id number]    59 yo Male pmhx CVA with mild left sided deficits, HTN, DM2, vertigo, HLD, Mobitz II s/pp Medtronic dual chamber ICD (12/21/22) initially presented to [facility] from home with complaints of dyspnea and chest pain and was admitted w/ acute hypoxic respiratory failure thought 2/2 ADHF vs PNA c/b enterovirus. Pt with brief MICU stay at [facility] requiring bipap (no intubation), was treated for pna with cefepime, and was found to have a newly depressed EF to 20% with multiple regional wall motion abnormalities. Pt was transferred to [facility] for ischemic eval.    At time of arrival, patient reporting mild SOB on 2L NC, otherwise denies CP or any other symptoms.       Allergies:  No Known Allergies      Home Meds:    Current Medications:   cefepime   IVPB 2000 milliGRAM(s) IV Intermittent once  cefepime   IVPB      dextrose 5%. 1000 milliLiter(s) IV Continuous <Continuous>  dextrose 5%. 1000 milliLiter(s) IV Continuous <Continuous>  dextrose 50% Injectable 12.5 Gram(s) IV Push once  dextrose 50% Injectable 25 Gram(s) IV Push once  dextrose 50% Injectable 25 Gram(s) IV Push once  dextrose Oral Gel 15 Gram(s) Oral once PRN  glucagon  Injectable 1 milliGRAM(s) IntraMuscular once  insulin glargine Injectable (LANTUS) 40 Unit(s) SubCutaneous at bedtime  insulin lispro (ADMELOG) corrective regimen sliding scale   SubCutaneous at bedtime  insulin lispro (ADMELOG) corrective regimen sliding scale   SubCutaneous three times a day before meals  insulin lispro Injectable (ADMELOG) 20 Unit(s) SubCutaneous three times a day before meals  pantoprazole    Tablet 40 milliGRAM(s) Oral before breakfast  tamsulosin 0.4 milliGRAM(s) Oral at bedtime      REVIEW OF SYSTEMS:  All other review of systems is negative unless indicated above.    Physical Exam:  T(F): 98.4 (10-13), Max: 98.4 (10-13)  HR: 134 (10-13) (124 - 139)  BP: 127/88 (10-13) (123/86 - 127/88)  RR: 18 (10-13)  SpO2: 94% (10-13)  GENERAL: No acute distress, well-developed  HEAD:  Atraumatic, Normocephalic  ENT: EOMI, JVP ~8  CHEST/LUNG: poor inspiratory effort, crackles at bases bilaterally   HEART: Regular rate and rhythm; No murmurs, rubs, or gallops  ABDOMEN: Soft, Nontender, Nondistended  EXTREMITIES:  No clubbing, cyanosis, or edema  NEUROLOGY: AAOx3, non-focal, cranial nerves intact      ECG: Personally reviewed:    Echo:  9/6/2023  Summary:   1. Technically limited study.   2. Left ventricular ejection fraction, by visual estimation, is 20%.   3. Severely decreased global left ventricular systolic function.   4. Multiple left ventricular regional wall motion abnormalities exist.  See wall motion findings.   5. Elevated left ventricular end-diastolic pressure.   6. Spectral Doppler shows pseudonormal pattern of left ventricular  myocardial filling (Grade II diastolic dysfunction).   7. Global longitudinal strain using [employer] software at a HR of 121bpm and BP  137/99 is -5.1%, which is severely reduced. There is some sparing of the  basal segments.   8. Moderately reduced RVsystolic function.   9. The left atrium is normal in size.  10. Trivial pericardial effusion.  11. Mild tricuspid regurgitation.    LV Wall Scoring:  The entire apex is akinetic. The mid anteroseptal segment, mid  inferoseptal segment, mid inferolateral segment, mid anterolateral segment, mid  anterior segment, and mid inferior segment are hypokinetic. All remaining scored  segments are normal.      Imaging:    CXR: Personally reviewed    Labs: Personally reviewed                               Patient seen and evaluated at bedside      HPI:  Patient with separate chart from [facility], MRN# [id number]    59 yo Male pmhx CVA with mild left sided deficits, HTN, DM2, vertigo, HLD, Mobitz II s/pp Medtronic dual chamber ICD (12/21/22) initially presented to [facility] from home with complaints of dyspnea and chest pain and was admitted w/ acute hypoxic respiratory failure thought 2/2 ADHF vs PNA c/b enterovirus. Pt with brief MICU stay at [facility] requiring bipap (no intubation), was treated for pna with cefepime, and was found to have a newly depressed EF to 20% with multiple regional wall motion abnormalities. Pt was transferred to [facility] for ischemic eval.    At time of arrival, patient reporting mild SOB on 2L NC, otherwise denies CP or any other symptoms.       Allergies:  No Known Allergies      Home Meds:    Current Medications:   cefepime   IVPB 2000 milliGRAM(s) IV Intermittent once  cefepime   IVPB      dextrose 5%. 1000 milliLiter(s) IV Continuous <Continuous>  dextrose 5%. 1000 milliLiter(s) IV Continuous <Continuous>  dextrose 50% Injectable 12.5 Gram(s) IV Push once  dextrose 50% Injectable 25 Gram(s) IV Push once  dextrose 50% Injectable 25 Gram(s) IV Push once  dextrose Oral Gel 15 Gram(s) Oral once PRN  glucagon  Injectable 1 milliGRAM(s) IntraMuscular once  insulin glargine Injectable (LANTUS) 40 Unit(s) SubCutaneous at bedtime  insulin lispro (ADMELOG) corrective regimen sliding scale   SubCutaneous at bedtime  insulin lispro (ADMELOG) corrective regimen sliding scale   SubCutaneous three times a day before meals  insulin lispro Injectable (ADMELOG) 20 Unit(s) SubCutaneous three times a day before meals  pantoprazole    Tablet 40 milliGRAM(s) Oral before breakfast  tamsulosin 0.4 milliGRAM(s) Oral at bedtime      REVIEW OF SYSTEMS:  All other review of systems is negative unless indicated above.    Physical Exam:  T(F): 98.4 (10-13), Max: 98.4 (10-13)  HR: 134 (10-13) (124 - 139)  BP: 127/88 (10-13) (123/86 - 127/88)  RR: 18 (10-13)  SpO2: 94% (10-13)  GENERAL: No acute distress, well-developed  HEAD:  Atraumatic, Normocephalic  ENT: EOMI, JVP ~8  CHEST/LUNG: poor inspiratory effort, crackles at bases bilaterally   HEART: Regular rate and rhythm; No murmurs, rubs, or gallops  ABDOMEN: Soft, Nontender, Nondistended  EXTREMITIES:  No clubbing, cyanosis, or edema  NEUROLOGY: AAOx3, non-focal, cranial nerves intact      ECG: Personally reviewed:    Echo:  9/6/2023  Summary:   1. Technically limited study.   2. Left ventricular ejection fraction, by visual estimation, is 20%.   3. Severely decreased global left ventricular systolic function.   4. Multiple left ventricular regional wall motion abnormalities exist.  See wall motion findings.   5. Elevated left ventricular end-diastolic pressure.   6. Spectral Doppler shows pseudonormal pattern of left ventricular  myocardial filling (Grade II diastolic dysfunction).   7. Global longitudinal strain using [employer] software at a HR of 121bpm and BP  137/99 is -5.1%, which is severely reduced. There is some sparing of the  basal segments.   8. Moderately reduced RVsystolic function.   9. The left atrium is normal in size.  10. Trivial pericardial effusion.  11. Mild tricuspid regurgitation.    LV Wall Scoring:  The entire apex is akinetic. The mid anteroseptal segment, mid  inferoseptal segment, mid inferolateral segment, mid anterolateral segment, mid  anterior segment, and mid inferior segment are hypokinetic. All remaining scored  segments are normal.      Imaging:    CXR: Personally reviewed    Labs: Personally reviewed   Patient seen and evaluated at bedside      HPI:  Patient with separate chart from [facility], MRN# [id number]    57 yo Male pmhx CVA with mild left sided deficits, HTN, DM2, vertigo, HLD, Mobitz II s/pp Medtronic dual chamber ICD (12/21/22) initially presented to [facility] from home with complaints of dyspnea and chest pain and was admitted w/ acute hypoxic respiratory failure thought 2/2 ADHF vs PNA c/b enterovirus. Pt with brief MICU stay at [facility] requiring bipap (no intubation), was treated for pna with cefepime, and was found to have a newly depressed EF to 20% with multiple regional wall motion abnormalities. Pt was transferred to [facility] for ischemic eval.    At time of arrival, patient reporting mild SOB on 2L NC, otherwise denies CP or any other symptoms.       Allergies:  No Known Allergies      Home Meds:    Current Medications:   cefepime   IVPB 2000 milliGRAM(s) IV Intermittent once  cefepime   IVPB      dextrose 5%. 1000 milliLiter(s) IV Continuous <Continuous>  dextrose 5%. 1000 milliLiter(s) IV Continuous <Continuous>  dextrose 50% Injectable 12.5 Gram(s) IV Push once  dextrose 50% Injectable 25 Gram(s) IV Push once  dextrose 50% Injectable 25 Gram(s) IV Push once  dextrose Oral Gel 15 Gram(s) Oral once PRN  glucagon  Injectable 1 milliGRAM(s) IntraMuscular once  insulin glargine Injectable (LANTUS) 40 Unit(s) SubCutaneous at bedtime  insulin lispro (ADMELOG) corrective regimen sliding scale   SubCutaneous at bedtime  insulin lispro (ADMELOG) corrective regimen sliding scale   SubCutaneous three times a day before meals  insulin lispro Injectable (ADMELOG) 20 Unit(s) SubCutaneous three times a day before meals  pantoprazole    Tablet 40 milliGRAM(s) Oral before breakfast  tamsulosin 0.4 milliGRAM(s) Oral at bedtime      REVIEW OF SYSTEMS:  All other review of systems is negative unless indicated above.    Physical Exam:  T(F): 98.4 (10-13), Max: 98.4 (10-13)  HR: 134 (10-13) (124 - 139)  BP: 127/88 (10-13) (123/86 - 127/88)  RR: 18 (10-13)  SpO2: 94% (10-13)  GENERAL: No acute distress, well-developed  HEAD:  Atraumatic, Normocephalic  ENT: EOMI, JVP ~8  CHEST/LUNG: poor inspiratory effort, crackles at bases bilaterally   HEART: Regular rate and rhythm; No murmurs, rubs, or gallops  ABDOMEN: Soft, Nontender, Nondistended  EXTREMITIES:  No clubbing, cyanosis, or edema  NEUROLOGY: AAOx3, non-focal, cranial nerves intact      ECG: Personally reviewed:    Echo:  9/6/2023  Summary:   1. Technically limited study.   2. Left ventricular ejection fraction, by visual estimation, is 20%.   3. Severely decreased global left ventricular systolic function.   4. Multiple left ventricular regional wall motion abnormalities exist.  See wall motion findings.   5. Elevated left ventricular end-diastolic pressure.   6. Spectral Doppler shows pseudonormal pattern of left ventricular  myocardial filling (Grade II diastolic dysfunction).   7. Global longitudinal strain using [employer] software at a HR of 121bpm and BP  137/99 is -5.1%, which is severely reduced. There is some sparing of the  basal segments.   8. Moderately reduced RVsystolic function.   9. The left atrium is normal in size.  10. Trivial pericardial effusion.  11. Mild tricuspid regurgitation.    LV Wall Scoring:  The entire apex is akinetic. The mid anteroseptal segment, mid  inferoseptal segment, mid inferolateral segment, mid anterolateral segment, mid  anterior segment, and mid inferior segment are hypokinetic. All remaining scored  segments are normal.      Imaging:    CXR: Personally reviewed    Labs: Personally reviewed

## 2023-10-13 NOTE — H&P ADULT - NSICDXPASTMEDICALHX_GEN_ALL_CORE_FT
PAST MEDICAL HISTORY:  CVA (cerebrovascular accident)     HLD (hyperlipidemia)     HTN (hypertension)     T2DM (type 2 diabetes mellitus)

## 2023-10-13 NOTE — PROGRESS NOTE ADULT - NUTRITIONAL ASSESSMENT
This patient has been assessed with a concern for Malnutrition and has been determined to have a diagnosis/diagnoses of Moderate protein-calorie malnutrition.    This patient is being managed with:   Diet DASH/TLC-  Sodium & Cholesterol Restricted  Consistent Carbohydrate {Evening Snack}  Vladimir  Entered: Sep 27 2023  1:03PM  

## 2023-10-13 NOTE — H&P ADULT - PROBLEM SELECTOR PLAN 5
Sinus tachycardia on telemetry. May be in setting of cardiac ischemia vs hypovolemia, dehydration vs infection. Pt not in pain, not febrile.   - Monitor on telemetry  - Continue metoprolol  - Hold diuresis

## 2023-10-13 NOTE — PROGRESS NOTE ADULT - REASON FOR ADMISSION
CHF Exacerbation
CHF Exacerbation, NSTEMI
HF
CHF Exacerbation

## 2023-10-13 NOTE — H&P ADULT - PROBLEM SELECTOR PLAN 6
A1C 6.9. FS >200s. [facility]  - Continue Lantus 40u qhs  - Continue Admelog premeals 20u TID before meals  - Low insulin sliding scale  - FS before meals and at bedtime  - CC diet

## 2023-10-13 NOTE — PROGRESS NOTE ADULT - SUBJECTIVE AND OBJECTIVE BOX
Patient is a 58y old  Male who presents with a chief complaint of CHF Exacerbation (10 Oct 2023 17:10)      INTERVAL HPI/OVERNIGHT EVENTS: Overnight no fever   today await xfer to [facility]      Vital Signs Last 24 Hrs  T(C): 37 (13 Oct 2023 05:08), Max: 37 (13 Oct 2023 05:08)  T(F): 98.6 (13 Oct 2023 05:08), Max: 98.6 (13 Oct 2023 05:08)  HR: 112 (13 Oct 2023 05:08) (108 - 118)  BP: 101/66 (13 Oct 2023 05:08) (101/66 - 114/73)  BP(mean): --  RR: 18 (13 Oct 2023 05:08) (18 - 18)  SpO2: 98% (13 Oct 2023 05:08) (98% - 99%)    Parameters below as of 12 Oct 2023 16:58  Patient On (Oxygen Delivery Method): nasal cannula        PHYSICAL EXAM:  General: Nontoxic-appearing Male in no acute distress. NC  HEENT: AT/NC. Anicteric. Conjunctiva pink and moist. Oropharynx clear.  Neck: Not rigid. No sense of mass.  Nodes: None palpable.  Chest: AICD site benign  Lungs: diminished breath sounds b/l at the bases, no wheezes, no rhonchi   Heart: Regular rate and rhythm. No Murmur. No rub. No gallop. No palpable thrill.   Abdomen: Bowel sounds present and normoactive. Soft. Nondistended. Nontender. No sense of mass. No organomegaly.  Back: No spinal tenderness. No costovertebral angle tenderness.   Extremities: No cyanosis or clubbing. No edema.   Skin: Warm. Dry. Good turgor. No rash. No vasculitic stigmata.  Psychiatric: Appropriate affect and mood for situation.     LABS:                                 10.9   8.65  )-----------( 359      ( 13 Oct 2023 05:27 )             34.5   10-13    136  |  104  |  30<H>  ----------------------------<  176<H>  3.8   |  26  |  1.43<H>    Ca    7.9<L>      13 Oct 2023 05:27      CAPILLARY BLOOD GLUCOSE    CAPILLARY BLOOD GLUCOSE    POCT Blood Glucose.: 155 mg/dL (13 Oct 2023 09:17)  POCT Blood Glucose.: 169 mg/dL (12 Oct 2023 21:42)  POCT Blood Glucose.: 164 mg/dL (12 Oct 2023 16:18)  POCT Blood Glucose.: 156 mg/dL (12 Oct 2023 11:42)      RADIOLOGY & ADDITIONAL TESTS:    Imaging Personally Reviewed:  [ X] YES  [ ] NO    Consultant(s) Notes Reviewed:  [ X] YES  [ ] NO    Care Discussed with Consultants/Other Providers [X ] YES  [ ] NO

## 2023-10-13 NOTE — CHART NOTE - NSCHARTNOTESELECT_GEN_ALL_CORE
Event Note
Nutrition Services
Transfer Note
Nutrition Services
POCUS/Event Note
palpitations/Event Note

## 2023-10-14 LAB
ALBUMIN SERPL ELPH-MCNC: 3.3 G/DL — SIGNIFICANT CHANGE UP (ref 3.3–5)
ALP SERPL-CCNC: 59 U/L — SIGNIFICANT CHANGE UP (ref 40–120)
ALT FLD-CCNC: 33 U/L — SIGNIFICANT CHANGE UP (ref 10–45)
ANION GAP SERPL CALC-SCNC: 13 MMOL/L — SIGNIFICANT CHANGE UP (ref 5–17)
ANION GAP SERPL CALC-SCNC: 15 MMOL/L — SIGNIFICANT CHANGE UP (ref 5–17)
APTT BLD: 29 SEC — SIGNIFICANT CHANGE UP (ref 24.5–35.6)
AST SERPL-CCNC: 15 U/L — SIGNIFICANT CHANGE UP (ref 10–40)
BILIRUB SERPL-MCNC: 0.2 MG/DL — SIGNIFICANT CHANGE UP (ref 0.2–1.2)
BUN SERPL-MCNC: 29 MG/DL — HIGH (ref 7–23)
BUN SERPL-MCNC: 31 MG/DL — HIGH (ref 7–23)
CALCIUM SERPL-MCNC: 8.8 MG/DL — SIGNIFICANT CHANGE UP (ref 8.4–10.5)
CALCIUM SERPL-MCNC: 8.9 MG/DL — SIGNIFICANT CHANGE UP (ref 8.4–10.5)
CHLORIDE SERPL-SCNC: 100 MMOL/L — SIGNIFICANT CHANGE UP (ref 96–108)
CHLORIDE SERPL-SCNC: 102 MMOL/L — SIGNIFICANT CHANGE UP (ref 96–108)
CK MB BLD-MCNC: 12.6 % — HIGH (ref 0–3.5)
CK MB CFR SERPL CALC: 25.5 NG/ML — HIGH (ref 0–6.7)
CK SERPL-CCNC: 202 U/L — HIGH (ref 30–200)
CO2 SERPL-SCNC: 21 MMOL/L — LOW (ref 22–31)
CO2 SERPL-SCNC: 22 MMOL/L — SIGNIFICANT CHANGE UP (ref 22–31)
CREAT SERPL-MCNC: 1.27 MG/DL — SIGNIFICANT CHANGE UP (ref 0.5–1.3)
CREAT SERPL-MCNC: 1.38 MG/DL — HIGH (ref 0.5–1.3)
EGFR: 60 ML/MIN/1.73M2 — SIGNIFICANT CHANGE UP
EGFR: 66 ML/MIN/1.73M2 — SIGNIFICANT CHANGE UP
GLUCOSE BLDC GLUCOMTR-MCNC: 115 MG/DL — HIGH (ref 70–99)
GLUCOSE BLDC GLUCOMTR-MCNC: 149 MG/DL — HIGH (ref 70–99)
GLUCOSE BLDC GLUCOMTR-MCNC: 167 MG/DL — HIGH (ref 70–99)
GLUCOSE BLDC GLUCOMTR-MCNC: 209 MG/DL — HIGH (ref 70–99)
GLUCOSE BLDC GLUCOMTR-MCNC: 80 MG/DL — SIGNIFICANT CHANGE UP (ref 70–99)
GLUCOSE SERPL-MCNC: 190 MG/DL — HIGH (ref 70–99)
GLUCOSE SERPL-MCNC: 310 MG/DL — HIGH (ref 70–99)
HCT VFR BLD CALC: 37 % — LOW (ref 39–50)
HCT VFR BLD CALC: 37.5 % — LOW (ref 39–50)
HGB BLD-MCNC: 11.7 G/DL — LOW (ref 13–17)
HGB BLD-MCNC: 11.9 G/DL — LOW (ref 13–17)
INR BLD: 1.08 RATIO — SIGNIFICANT CHANGE UP (ref 0.85–1.18)
MAGNESIUM SERPL-MCNC: 2.3 MG/DL — SIGNIFICANT CHANGE UP (ref 1.6–2.6)
MAGNESIUM SERPL-MCNC: 2.3 MG/DL — SIGNIFICANT CHANGE UP (ref 1.6–2.6)
MCHC RBC-ENTMCNC: 25.4 PG — LOW (ref 27–34)
MCHC RBC-ENTMCNC: 25.4 PG — LOW (ref 27–34)
MCHC RBC-ENTMCNC: 31.6 GM/DL — LOW (ref 32–36)
MCHC RBC-ENTMCNC: 31.7 GM/DL — LOW (ref 32–36)
MCV RBC AUTO: 80 FL — SIGNIFICANT CHANGE UP (ref 80–100)
MCV RBC AUTO: 80.4 FL — SIGNIFICANT CHANGE UP (ref 80–100)
NRBC # BLD: 0 /100 WBCS — SIGNIFICANT CHANGE UP (ref 0–0)
NRBC # BLD: 0 /100 WBCS — SIGNIFICANT CHANGE UP (ref 0–0)
PHOSPHATE SERPL-MCNC: 3.1 MG/DL — SIGNIFICANT CHANGE UP (ref 2.5–4.5)
PHOSPHATE SERPL-MCNC: 3.4 MG/DL — SIGNIFICANT CHANGE UP (ref 2.5–4.5)
PLATELET # BLD AUTO: 384 K/UL — SIGNIFICANT CHANGE UP (ref 150–400)
PLATELET # BLD AUTO: 394 K/UL — SIGNIFICANT CHANGE UP (ref 150–400)
POTASSIUM SERPL-MCNC: 3.7 MMOL/L — SIGNIFICANT CHANGE UP (ref 3.5–5.3)
POTASSIUM SERPL-MCNC: 3.8 MMOL/L — SIGNIFICANT CHANGE UP (ref 3.5–5.3)
POTASSIUM SERPL-SCNC: 3.7 MMOL/L — SIGNIFICANT CHANGE UP (ref 3.5–5.3)
POTASSIUM SERPL-SCNC: 3.8 MMOL/L — SIGNIFICANT CHANGE UP (ref 3.5–5.3)
PROT SERPL-MCNC: 6.6 G/DL — SIGNIFICANT CHANGE UP (ref 6–8.3)
PROTHROM AB SERPL-ACNC: 11.9 SEC — SIGNIFICANT CHANGE UP (ref 9.5–13)
RBC # BLD: 4.6 M/UL — SIGNIFICANT CHANGE UP (ref 4.2–5.8)
RBC # BLD: 4.69 M/UL — SIGNIFICANT CHANGE UP (ref 4.2–5.8)
RBC # FLD: 14.3 % — SIGNIFICANT CHANGE UP (ref 10.3–14.5)
RBC # FLD: 14.3 % — SIGNIFICANT CHANGE UP (ref 10.3–14.5)
SARS-COV-2 RNA SPEC QL NAA+PROBE: SIGNIFICANT CHANGE UP
SODIUM SERPL-SCNC: 136 MMOL/L — SIGNIFICANT CHANGE UP (ref 135–145)
SODIUM SERPL-SCNC: 137 MMOL/L — SIGNIFICANT CHANGE UP (ref 135–145)
TROPONIN T, HIGH SENSITIVITY RESULT: 752 NG/L — HIGH (ref 0–51)
WBC # BLD: 10.62 K/UL — HIGH (ref 3.8–10.5)
WBC # BLD: 9.51 K/UL — SIGNIFICANT CHANGE UP (ref 3.8–10.5)
WBC # FLD AUTO: 10.62 K/UL — HIGH (ref 3.8–10.5)
WBC # FLD AUTO: 9.51 K/UL — SIGNIFICANT CHANGE UP (ref 3.8–10.5)

## 2023-10-14 PROCEDURE — 99221 1ST HOSP IP/OBS SF/LOW 40: CPT

## 2023-10-14 PROCEDURE — 99232 SBSQ HOSP IP/OBS MODERATE 35: CPT

## 2023-10-14 RX ORDER — ATORVASTATIN CALCIUM 80 MG/1
80 TABLET, FILM COATED ORAL AT BEDTIME
Refills: 0 | Status: DISCONTINUED | OUTPATIENT
Start: 2023-10-14 | End: 2023-11-21

## 2023-10-14 RX ORDER — BUMETANIDE 0.25 MG/ML
2 INJECTION INTRAMUSCULAR; INTRAVENOUS DAILY
Refills: 0 | Status: DISCONTINUED | OUTPATIENT
Start: 2023-10-14 | End: 2023-10-17

## 2023-10-14 RX ADMIN — ATORVASTATIN CALCIUM 80 MILLIGRAM(S): 80 TABLET, FILM COATED ORAL at 21:19

## 2023-10-14 RX ADMIN — PANTOPRAZOLE SODIUM 40 MILLIGRAM(S): 20 TABLET, DELAYED RELEASE ORAL at 05:14

## 2023-10-14 RX ADMIN — HEPARIN SODIUM 5000 UNIT(S): 5000 INJECTION INTRAVENOUS; SUBCUTANEOUS at 21:19

## 2023-10-14 RX ADMIN — Medication 25 MILLIGRAM(S): at 05:14

## 2023-10-14 RX ADMIN — HEPARIN SODIUM 5000 UNIT(S): 5000 INJECTION INTRAVENOUS; SUBCUTANEOUS at 13:59

## 2023-10-14 RX ADMIN — Medication 1: at 08:27

## 2023-10-14 RX ADMIN — CEFEPIME 100 MILLIGRAM(S): 1 INJECTION, POWDER, FOR SOLUTION INTRAMUSCULAR; INTRAVENOUS at 17:29

## 2023-10-14 RX ADMIN — HEPARIN SODIUM 5000 UNIT(S): 5000 INJECTION INTRAVENOUS; SUBCUTANEOUS at 05:15

## 2023-10-14 RX ADMIN — Medication 81 MILLIGRAM(S): at 12:17

## 2023-10-14 RX ADMIN — BUMETANIDE 2 MILLIGRAM(S): 0.25 INJECTION INTRAMUSCULAR; INTRAVENOUS at 05:14

## 2023-10-14 RX ADMIN — Medication 100 MILLIGRAM(S): at 00:29

## 2023-10-14 RX ADMIN — Medication 20 UNIT(S): at 08:27

## 2023-10-14 RX ADMIN — INSULIN GLARGINE 40 UNIT(S): 100 INJECTION, SOLUTION SUBCUTANEOUS at 21:19

## 2023-10-14 RX ADMIN — Medication 20 UNIT(S): at 12:16

## 2023-10-14 RX ADMIN — CEFEPIME 100 MILLIGRAM(S): 1 INJECTION, POWDER, FOR SOLUTION INTRAMUSCULAR; INTRAVENOUS at 05:14

## 2023-10-14 RX ADMIN — TAMSULOSIN HYDROCHLORIDE 0.4 MILLIGRAM(S): 0.4 CAPSULE ORAL at 21:19

## 2023-10-14 RX ADMIN — CLOPIDOGREL BISULFATE 75 MILLIGRAM(S): 75 TABLET, FILM COATED ORAL at 12:17

## 2023-10-14 NOTE — PROGRESS NOTE ADULT - PROBLEM SELECTOR PLAN 3
Initially found to be hypoxic requiring BiPAP prn at [facility]. Found to be entero rhinovirus positive.  - Continuous pulse oximetry  Finish 14-day course of Cefepime (last day 10/14/23)   - CT scan findings reviewed showing B/L opacities concerning for edema vs infection.  - incentive spirometry as tolerated Initially found to be hypoxic requiring BiPAP prn at [facility]. Found to be entero rhinovirus positive.  - Continuous pulse oximetry  Finish 14-day course of Cefepime (last day 10/14/23)   - CT scan findings reviewed showing B/L opacities concerning for edema vs infection.  - Incentive spirometry as tolerated

## 2023-10-14 NOTE — PROGRESS NOTE ADULT - SUBJECTIVE AND OBJECTIVE BOX
[facility] Division of Hospital Medicine  Lea Mackey DO  Pager (M-F, 8A-5P): MS Teams PREFERRED      SUBJECTIVE / OVERNIGHT EVENTS:  ADDITIONAL REVIEW OF SYSTEMS:    MEDICATIONS  (STANDING):  aspirin enteric coated 81 milliGRAM(s) Oral daily  atorvastatin 80 milliGRAM(s) Oral at bedtime  buMETAnide 2 milliGRAM(s) Oral daily  cefepime   IVPB 2000 milliGRAM(s) IV Intermittent every 12 hours  cefepime   IVPB      clopidogrel Tablet 75 milliGRAM(s) Oral daily  dextrose 5%. 1000 milliLiter(s) (50 mL/Hr) IV Continuous <Continuous>  dextrose 5%. 1000 milliLiter(s) (100 mL/Hr) IV Continuous <Continuous>  dextrose 50% Injectable 12.5 Gram(s) IV Push once  dextrose 50% Injectable 25 Gram(s) IV Push once  dextrose 50% Injectable 25 Gram(s) IV Push once  glucagon  Injectable 1 milliGRAM(s) IntraMuscular once  heparin   Injectable 5000 Unit(s) SubCutaneous every 8 hours  insulin glargine Injectable (LANTUS) 40 Unit(s) SubCutaneous at bedtime  insulin lispro (ADMELOG) corrective regimen sliding scale   SubCutaneous at bedtime  insulin lispro (ADMELOG) corrective regimen sliding scale   SubCutaneous three times a day before meals  insulin lispro Injectable (ADMELOG) 20 Unit(s) SubCutaneous three times a day before meals  metoprolol succinate ER 25 milliGRAM(s) Oral daily  pantoprazole    Tablet 40 milliGRAM(s) Oral before breakfast  tamsulosin 0.4 milliGRAM(s) Oral at bedtime    MEDICATIONS  (PRN):  acetaminophen     Tablet .. 650 milliGRAM(s) Oral every 6 hours PRN Temp greater or equal to 38C (100.4F), Mild Pain (1 - 3)  albuterol    90 MICROgram(s) HFA Inhaler 2 Puff(s) Inhalation every 6 hours PRN Shortness of Breath and/or Wheezing  dextrose Oral Gel 15 Gram(s) Oral once PRN Blood Glucose LESS THAN 70 milliGRAM(s)/deciliter  guaiFENesin Oral Liquid (Sugar-Free) 100 milliGRAM(s) Oral every 6 hours PRN Cough      I&O's Summary    13 Oct 2023 07:01  -  14 Oct 2023 07:00  --------------------------------------------------------  IN: 240 mL / OUT: 0 mL / NET: 240 mL        PHYSICAL EXAM:  Vital Signs Last 24 Hrs  T(C): 37.4 (14 Oct 2023 04:42), Max: 37.4 (14 Oct 2023 04:42)  T(F): 99.4 (14 Oct 2023 04:42), Max: 99.4 (14 Oct 2023 04:42)  HR: 118 (14 Oct 2023 08:10) (118 - 139)  BP: 106/70 (14 Oct 2023 04:42) (106/70 - 129/90)  BP(mean): --  RR: 20 (14 Oct 2023 04:42) (18 - 20)  SpO2: 90% (14 Oct 2023 04:42) (90% - 94%)    Parameters below as of 14 Oct 2023 08:10  Patient On (Oxygen Delivery Method): room air          LABS:                        11.7   10.62 )-----------( 384      ( 14 Oct 2023 06:18 )             37.0     10-14    136  |  102  |  29<H>  ----------------------------<  190<H>  3.7   |  21<L>  |  1.27    Ca    8.8      14 Oct 2023 06:18  Phos  3.1     10-14  Mg     2.3     10-14    TPro  6.6  /  Alb  3.3  /  TBili  0.2  /  DBili  x   /  AST  15  /  ALT  33  /  AlkPhos  59  10-14    PT/INR - ( 14 Oct 2023 00:04 )   PT: 11.9 sec;   INR: 1.08 ratio         PTT - ( 14 Oct 2023 00:04 )  PTT:29.0 sec  CARDIAC MARKERS ( 14 Oct 2023 06:18 )  x     / x     / 202 U/L / x     / 25.5 ng/mL      Urinalysis Basic - ( 14 Oct 2023 06:18 )    Color: x / Appearance: x / SG: x / pH: x  Gluc: 190 mg/dL / Ketone: x  / Bili: x / Urobili: x   Blood: x / Protein: x / Nitrite: x   Leuk Esterase: x / RBC: x / WBC x   Sq Epi: x / Non Sq Epi: x / Bacteria: x          RADIOLOGY & ADDITIONAL TESTS:  Results Reviewed:   Imaging Personally Reviewed:  Electrocardiogram Personally Reviewed:    COORDINATION OF CARE:  Care Discussed with Consultants/Other Providers [Y/N]: Y  Prior or Outpatient Records Reviewed [Y/N]: Y   [facility] Division of Hospital Medicine  Lea DO Narendra  Pager (M-F, 8A-5P): MS Teams PREFERRED      SUBJECTIVE / OVERNIGHT EVENTS: Patient with no acute complaints - denies chest pain, palpitations, dyspnea, N/V, abdominal pain.   ADDITIONAL REVIEW OF SYSTEMS:    MEDICATIONS  (STANDING):  aspirin enteric coated 81 milliGRAM(s) Oral daily  atorvastatin 80 milliGRAM(s) Oral at bedtime  buMETAnide 2 milliGRAM(s) Oral daily  cefepime   IVPB 2000 milliGRAM(s) IV Intermittent every 12 hours  cefepime   IVPB      clopidogrel Tablet 75 milliGRAM(s) Oral daily  dextrose 5%. 1000 milliLiter(s) (50 mL/Hr) IV Continuous <Continuous>  dextrose 5%. 1000 milliLiter(s) (100 mL/Hr) IV Continuous <Continuous>  dextrose 50% Injectable 12.5 Gram(s) IV Push once  dextrose 50% Injectable 25 Gram(s) IV Push once  dextrose 50% Injectable 25 Gram(s) IV Push once  glucagon  Injectable 1 milliGRAM(s) IntraMuscular once  heparin   Injectable 5000 Unit(s) SubCutaneous every 8 hours  insulin glargine Injectable (LANTUS) 40 Unit(s) SubCutaneous at bedtime  insulin lispro (ADMELOG) corrective regimen sliding scale   SubCutaneous at bedtime  insulin lispro (ADMELOG) corrective regimen sliding scale   SubCutaneous three times a day before meals  insulin lispro Injectable (ADMELOG) 20 Unit(s) SubCutaneous three times a day before meals  metoprolol succinate ER 25 milliGRAM(s) Oral daily  pantoprazole    Tablet 40 milliGRAM(s) Oral before breakfast  tamsulosin 0.4 milliGRAM(s) Oral at bedtime    MEDICATIONS  (PRN):  acetaminophen     Tablet .. 650 milliGRAM(s) Oral every 6 hours PRN Temp greater or equal to 38C (100.4F), Mild Pain (1 - 3)  albuterol    90 MICROgram(s) HFA Inhaler 2 Puff(s) Inhalation every 6 hours PRN Shortness of Breath and/or Wheezing  dextrose Oral Gel 15 Gram(s) Oral once PRN Blood Glucose LESS THAN 70 milliGRAM(s)/deciliter  guaiFENesin Oral Liquid (Sugar-Free) 100 milliGRAM(s) Oral every 6 hours PRN Cough      I&O's Summary    13 Oct 2023 07:01  -  14 Oct 2023 07:00  --------------------------------------------------------  IN: 240 mL / OUT: 0 mL / NET: 240 mL        PHYSICAL EXAM:  Vital Signs Last 24 Hrs  T(C): 37.4 (14 Oct 2023 04:42), Max: 37.4 (14 Oct 2023 04:42)  T(F): 99.4 (14 Oct 2023 04:42), Max: 99.4 (14 Oct 2023 04:42)  HR: 118 (14 Oct 2023 08:10) (118 - 139)  BP: 106/70 (14 Oct 2023 04:42) (106/70 - 129/90)  BP(mean): --  RR: 20 (14 Oct 2023 04:42) (18 - 20)  SpO2: 90% (14 Oct 2023 04:42) (90% - 94%)    Parameters below as of 14 Oct 2023 08:10  Patient On (Oxygen Delivery Method): room air    CONSTITUTIONAL: NAD, well-developed, well-groomed  EYES: Conjunctiva and sclera clear  ENMT: Moist oral mucosa, no pharyngeal injection or exudates   NECK: Supple, midline  RESPIRATORY: Normal respiratory effort; lungs are clear to auscultation bilaterally  CARDIOVASCULAR: Regular rate and rhythm, normal S1 and S2, no murmur/rub/gallop; No lower extremity edema; Peripheral pulses are 2+ bilaterally  ABDOMEN: Nontender to palpation, normoactive bowel sounds, no rebound/guarding  MUSCULOSKELETAL:  No clubbing or cyanosis of digits; no joint swelling or tenderness to palpation  PSYCH: A+O to person only; affect appropriate      LABS:                        11.7   10.62 )-----------( 384      ( 14 Oct 2023 06:18 )             37.0     10-14    136  |  102  |  29<H>  ----------------------------<  190<H>  3.7   |  21<L>  |  1.27    Ca    8.8      14 Oct 2023 06:18  Phos  3.1     10-14  Mg     2.3     10-14    TPro  6.6  /  Alb  3.3  /  TBili  0.2  /  DBili  x   /  AST  15  /  ALT  33  /  AlkPhos  59  10-14    PT/INR - ( 14 Oct 2023 00:04 )   PT: 11.9 sec;   INR: 1.08 ratio         PTT - ( 14 Oct 2023 00:04 )  PTT:29.0 sec  CARDIAC MARKERS ( 14 Oct 2023 06:18 )  x     / x     / 202 U/L / x     / 25.5 ng/mL      Urinalysis Basic - ( 14 Oct 2023 06:18 )    Color: x / Appearance: x / SG: x / pH: x  Gluc: 190 mg/dL / Ketone: x  / Bili: x / Urobili: x   Blood: x / Protein: x / Nitrite: x   Leuk Esterase: x / RBC: x / WBC x   Sq Epi: x / Non Sq Epi: x / Bacteria: x          RADIOLOGY & ADDITIONAL TESTS:  Results Reviewed:   Imaging Personally Reviewed:  Electrocardiogram Personally Reviewed:    COORDINATION OF CARE:  Care Discussed with Consultants/Other Providers [Y/N]: Y  Prior or Outpatient Records Reviewed [Y/N]: Y

## 2023-10-14 NOTE — PROGRESS NOTE ADULT - NSPROGADDITIONALINFOA_GEN_ALL_CORE
Time-based billing (NON-critical care).      minutes spent on total encounter. The necessity of the time spent during the encounter on this date of service was due to:     - Ordering, reviewing, and interpreting labs, testing, and imaging.  - Independently obtaining a review of systems and performing a physical exam  - Reviewing prior records and where necessary, outpatient records.  - Counselling and educating patient regarding interpretation of aforementioned items and plan of care.      d/w ACP Time-based billing (NON-critical care).     37 minutes spent on total encounter. The necessity of the time spent during the encounter on this date of service was due to:     - Ordering, reviewing, and interpreting labs, testing, and imaging.  - Independently obtaining a review of systems and performing a physical exam  - Reviewing prior records and where necessary, outpatient records.  - Counselling and educating patient regarding interpretation of aforementioned items and plan of care.    10/14: Discussed with wife, Mame, ([phone]) via phone. Discussed that patient oriented to self only. She reports that is his baseline - he has been forgetful since CVA and mentation waxes and wanes.     d/w ACP

## 2023-10-14 NOTE — PROGRESS NOTE ADULT - ASSESSMENT
57 yo Male pmhx CVA with mild left sided deficits, HTN, DM2, vertigo, HLD, Mobitz II s/pp Medtronic dual chamber ICD (12/21/22) initially presented to [facility] from home with complaints of dyspnea and chest pain and was admitted w/ acute hypoxic respiratory failure. Pt found to have new cardiomyopathy, LAURA, and positive for enterorhinovirus. Pt transferred to [facility] for cardiac cath evaluation.    57 yo Male PMHx CVA with mild left sided deficits, HTN, DM2, vertigo, HLD, Mobitz II s/pp Medtronic dual chamber ICD (12/21/22) initially presented to [facility] from home with complaints of dyspnea and chest pain and was admitted w/ acute hypoxic respiratory failure. Pt found to have new cardiomyopathy, LAURA, and positive for enterorhinovirus. Pt transferred to [facility] for cardiac cath evaluation.

## 2023-10-14 NOTE — PROGRESS NOTE ADULT - PROBLEM SELECTOR PLAN 2
TTE done 9/26 showing EF 20% with severely decreased LVSF, multiple wall motion abnormalities, elevated LV end diastolic pressure.   - Findings concerning for ischemic etiology  - Maintain K>4, Mg >2,Phos>3  - Strict I/Os, daily weights  - Continue metoprolol succinate 25mg qd   - Continue Bumex 2mg PO daily   - Cardiology following, planning for [facility].

## 2023-10-14 NOTE — PROGRESS NOTE ADULT - PROBLEM SELECTOR PLAN 1
Presenting initially with chest pain to [facility]. Troponin there went from 6791 --> 99666 --> 25087. Most recent troponin 752.  - EKG initially with TWI in lateral leads.   - EKG done 10/13 showing sinus tachycardia, widened QRS, TWI in V1-3, .   - Patient currently without symptoms of chest pain, dyspnea, or diaphoresis  - Continue ASA 81mg, Plavix 75mg qd, statin   - S/p heparin drip x72 hrs while at [facility]  - Cardiology following, planning for [facility]. Presenting initially with chest pain to [facility]. Troponin there went from 6791 --> 37349 --> 09985. Most recent troponin 752.  - EKG initially with TWI in lateral leads.   - EKG done 10/13 showing sinus tachycardia, widened QRS, TWI in V1-3, .   - Patient currently without symptoms of chest pain, dyspnea, or diaphoresis  - Continue ASA 81mg, Plavix 75mg qd, statin, BB  - S/p heparin drip x72 hrs while at [facility]  - Cardiology following, planning for [facility].

## 2023-10-14 NOTE — PROGRESS NOTE ADULT - PROBLEM SELECTOR PLAN 6
A1C 6.9. FS >200s. [facility]  - Continue Lantus 40u qhs  - Continue Admelog premeals 20u TID before meals  - Low insulin sliding scale  - FS before meals and at bedtime  - CC diet

## 2023-10-15 DIAGNOSIS — N40.0 BENIGN PROSTATIC HYPERPLASIA WITHOUT LOWER URINARY TRACT SYMPTOMS: ICD-10-CM

## 2023-10-15 LAB
A1C WITH ESTIMATED AVERAGE GLUCOSE RESULT: 8.4 % — HIGH (ref 4–5.6)
ANION GAP SERPL CALC-SCNC: 13 MMOL/L — SIGNIFICANT CHANGE UP (ref 5–17)
BUN SERPL-MCNC: 26 MG/DL — HIGH (ref 7–23)
CALCIUM SERPL-MCNC: 8.6 MG/DL — SIGNIFICANT CHANGE UP (ref 8.4–10.5)
CHLORIDE SERPL-SCNC: 103 MMOL/L — SIGNIFICANT CHANGE UP (ref 96–108)
CO2 SERPL-SCNC: 20 MMOL/L — LOW (ref 22–31)
CREAT SERPL-MCNC: 1.28 MG/DL — SIGNIFICANT CHANGE UP (ref 0.5–1.3)
EGFR: 66 ML/MIN/1.73M2 — SIGNIFICANT CHANGE UP
ESTIMATED AVERAGE GLUCOSE: 194 MG/DL — HIGH (ref 68–114)
GLUCOSE BLDC GLUCOMTR-MCNC: 124 MG/DL — HIGH (ref 70–99)
GLUCOSE BLDC GLUCOMTR-MCNC: 227 MG/DL — HIGH (ref 70–99)
GLUCOSE BLDC GLUCOMTR-MCNC: 233 MG/DL — HIGH (ref 70–99)
GLUCOSE BLDC GLUCOMTR-MCNC: 92 MG/DL — SIGNIFICANT CHANGE UP (ref 70–99)
GLUCOSE SERPL-MCNC: 99 MG/DL — SIGNIFICANT CHANGE UP (ref 70–99)
POTASSIUM SERPL-MCNC: 3.4 MMOL/L — LOW (ref 3.5–5.3)
POTASSIUM SERPL-SCNC: 3.4 MMOL/L — LOW (ref 3.5–5.3)
SODIUM SERPL-SCNC: 136 MMOL/L — SIGNIFICANT CHANGE UP (ref 135–145)

## 2023-10-15 PROCEDURE — 99232 SBSQ HOSP IP/OBS MODERATE 35: CPT | Mod: GC

## 2023-10-15 PROCEDURE — 99222 1ST HOSP IP/OBS MODERATE 55: CPT

## 2023-10-15 PROCEDURE — 99232 SBSQ HOSP IP/OBS MODERATE 35: CPT

## 2023-10-15 RX ORDER — INSULIN GLARGINE 100 [IU]/ML
30 INJECTION, SOLUTION SUBCUTANEOUS AT BEDTIME
Refills: 0 | Status: DISCONTINUED | OUTPATIENT
Start: 2023-10-15 | End: 2023-10-23

## 2023-10-15 RX ORDER — INSULIN LISPRO 100/ML
10 VIAL (ML) SUBCUTANEOUS
Refills: 0 | Status: DISCONTINUED | OUTPATIENT
Start: 2023-10-15 | End: 2023-10-23

## 2023-10-15 RX ORDER — POTASSIUM CHLORIDE 20 MEQ
40 PACKET (EA) ORAL ONCE
Refills: 0 | Status: COMPLETED | OUTPATIENT
Start: 2023-10-15 | End: 2023-10-15

## 2023-10-15 RX ADMIN — HEPARIN SODIUM 5000 UNIT(S): 5000 INJECTION INTRAVENOUS; SUBCUTANEOUS at 13:18

## 2023-10-15 RX ADMIN — Medication 81 MILLIGRAM(S): at 14:13

## 2023-10-15 RX ADMIN — Medication 10 UNIT(S): at 13:19

## 2023-10-15 RX ADMIN — Medication 2: at 18:16

## 2023-10-15 RX ADMIN — INSULIN GLARGINE 30 UNIT(S): 100 INJECTION, SOLUTION SUBCUTANEOUS at 22:02

## 2023-10-15 RX ADMIN — HEPARIN SODIUM 5000 UNIT(S): 5000 INJECTION INTRAVENOUS; SUBCUTANEOUS at 22:03

## 2023-10-15 RX ADMIN — Medication 2: at 13:19

## 2023-10-15 RX ADMIN — Medication 25 MILLIGRAM(S): at 05:43

## 2023-10-15 RX ADMIN — Medication 10 UNIT(S): at 18:16

## 2023-10-15 RX ADMIN — BUMETANIDE 2 MILLIGRAM(S): 0.25 INJECTION INTRAMUSCULAR; INTRAVENOUS at 05:43

## 2023-10-15 RX ADMIN — Medication 40 MILLIEQUIVALENT(S): at 10:22

## 2023-10-15 RX ADMIN — TAMSULOSIN HYDROCHLORIDE 0.4 MILLIGRAM(S): 0.4 CAPSULE ORAL at 22:03

## 2023-10-15 RX ADMIN — CLOPIDOGREL BISULFATE 75 MILLIGRAM(S): 75 TABLET, FILM COATED ORAL at 14:13

## 2023-10-15 RX ADMIN — ATORVASTATIN CALCIUM 80 MILLIGRAM(S): 80 TABLET, FILM COATED ORAL at 22:02

## 2023-10-15 RX ADMIN — PANTOPRAZOLE SODIUM 40 MILLIGRAM(S): 20 TABLET, DELAYED RELEASE ORAL at 08:27

## 2023-10-15 RX ADMIN — HEPARIN SODIUM 5000 UNIT(S): 5000 INJECTION INTRAVENOUS; SUBCUTANEOUS at 05:43

## 2023-10-15 NOTE — PROGRESS NOTE ADULT - PROBLEM SELECTOR PLAN 3
- Initially found to be hypoxic requiring BiPAP prn at [facility]. Found to be entero rhinovirus positive.  - Continuous pulse oximetry  - s/p 14-day course of Cefepime (last day 10/14/23)   - CT scan findings reviewed showing B/L opacities concerning for edema vs infection.  - Incentive spirometry as tolerated

## 2023-10-15 NOTE — PROGRESS NOTE ADULT - PROBLEM SELECTOR PLAN 1
- Presenting initially with chest pain to [facility]. Troponin there went from 6791 --> 21165 --> 51806. Most recent troponin 752.  - EKG initially with TWI in lateral leads.   - EKG done 10/13 showing sinus tachycardia, widened QRS, TWI in V1-3, .   - Patient currently without symptoms of chest pain, dyspnea, or diaphoresis  - Continue ASA 81mg, Plavix 75mg qd, statin, BB  - S/p heparin drip x72 hrs while at [facility]  - Cardiology following, planning for [facility].

## 2023-10-15 NOTE — PROGRESS NOTE ADULT - ATTENDING COMMENTS
59 y/o man with h/o CVA, HTN, DM, HLD, conduction disease/Mobitz II s/p dual chamber ICD, HFrEF (EF 20%) with segmental wall motion abnormalities transferred from OSH with ADHF/PNA now awaiting work-up for ischemic eval in setting of reduced LVEF with WMA.  --Markedly elevated trop at OSH with regional WMA on TTE--likely underlying CAD.  --Patient would like to discuss with his wife regarding angiography.   --Optimize GDMT as BP/Cr tolerate.  Cr appears to be stable.  --Keep O>I, strict Is and Os and daily weights.  --Monitor closely on telemetry.

## 2023-10-15 NOTE — PROGRESS NOTE ADULT - SUBJECTIVE AND OBJECTIVE BOX
[facility] Division of Hospital Medicine  Lea Mackey DO  Pager (M-F, 8A-5P): MS Teams PREFERRED      SUBJECTIVE / OVERNIGHT EVENTS:  ADDITIONAL REVIEW OF SYSTEMS:    MEDICATIONS  (STANDING):  aspirin enteric coated 81 milliGRAM(s) Oral daily  atorvastatin 80 milliGRAM(s) Oral at bedtime  buMETAnide 2 milliGRAM(s) Oral daily  clopidogrel Tablet 75 milliGRAM(s) Oral daily  dextrose 5%. 1000 milliLiter(s) (50 mL/Hr) IV Continuous <Continuous>  dextrose 5%. 1000 milliLiter(s) (100 mL/Hr) IV Continuous <Continuous>  dextrose 50% Injectable 12.5 Gram(s) IV Push once  dextrose 50% Injectable 25 Gram(s) IV Push once  dextrose 50% Injectable 25 Gram(s) IV Push once  glucagon  Injectable 1 milliGRAM(s) IntraMuscular once  heparin   Injectable 5000 Unit(s) SubCutaneous every 8 hours  insulin glargine Injectable (LANTUS) 40 Unit(s) SubCutaneous at bedtime  insulin lispro (ADMELOG) corrective regimen sliding scale   SubCutaneous at bedtime  insulin lispro (ADMELOG) corrective regimen sliding scale   SubCutaneous three times a day before meals  insulin lispro Injectable (ADMELOG) 20 Unit(s) SubCutaneous three times a day before meals  metoprolol succinate ER 25 milliGRAM(s) Oral daily  pantoprazole    Tablet 40 milliGRAM(s) Oral before breakfast  tamsulosin 0.4 milliGRAM(s) Oral at bedtime    MEDICATIONS  (PRN):  acetaminophen     Tablet .. 650 milliGRAM(s) Oral every 6 hours PRN Temp greater or equal to 38C (100.4F), Mild Pain (1 - 3)  albuterol    90 MICROgram(s) HFA Inhaler 2 Puff(s) Inhalation every 6 hours PRN Shortness of Breath and/or Wheezing  dextrose Oral Gel 15 Gram(s) Oral once PRN Blood Glucose LESS THAN 70 milliGRAM(s)/deciliter  guaiFENesin Oral Liquid (Sugar-Free) 100 milliGRAM(s) Oral every 6 hours PRN Cough      I&O's Summary    14 Oct 2023 07:01  -  15 Oct 2023 07:00  --------------------------------------------------------  IN: 720 mL / OUT: 0 mL / NET: 720 mL    15 Oct 2023 07:01  -  15 Oct 2023 08:00  --------------------------------------------------------  IN: 0 mL / OUT: 200 mL / NET: -200 mL        PHYSICAL EXAM:  Vital Signs Last 24 Hrs  T(C): 36.9 (14 Oct 2023 20:15), Max: 36.9 (14 Oct 2023 20:15)  T(F): 98.5 (14 Oct 2023 20:15), Max: 98.5 (14 Oct 2023 20:15)  HR: 125 (14 Oct 2023 20:15) (118 - 125)  BP: 114/78 (14 Oct 2023 20:15) (100/57 - 114/78)  BP(mean): --  RR: 18 (14 Oct 2023 20:15) (18 - 18)  SpO2: 91% (14 Oct 2023 20:15) (91% - 92%)    Parameters below as of 14 Oct 2023 20:15  Patient On (Oxygen Delivery Method): room air    CONSTITUTIONAL: NAD, well-developed, well-groomed  EYES: Conjunctiva and sclera clear  ENMT: Moist oral mucosa, no pharyngeal injection or exudates   NECK: Supple, midline  RESPIRATORY: Normal respiratory effort; lungs are clear to auscultation bilaterally  CARDIOVASCULAR: Regular rate and rhythm, normal S1 and S2, no murmur/rub/gallop; No lower extremity edema; Peripheral pulses are 2+ bilaterally  ABDOMEN: Nontender to palpation, normoactive bowel sounds, no rebound/guarding  MUSCULOSKELETAL:  No clubbing or cyanosis of digits; no joint swelling or tenderness to palpation  PSYCH: A+O to person only; affect appropriate      LABS:                        11.7   10.62 )-----------( 384      ( 14 Oct 2023 06:18 )             37.0     10-15    136  |  103  |  26<H>  ----------------------------<  99  3.4<L>   |  20<L>  |  1.28    Ca    8.6      15 Oct 2023 06:53  Phos  3.1     10-14  Mg     2.3     10-14    TPro  6.6  /  Alb  3.3  /  TBili  0.2  /  DBili  x   /  AST  15  /  ALT  33  /  AlkPhos  59  10-14    PT/INR - ( 14 Oct 2023 00:04 )   PT: 11.9 sec;   INR: 1.08 ratio         PTT - ( 14 Oct 2023 00:04 )  PTT:29.0 sec  CARDIAC MARKERS ( 14 Oct 2023 06:18 )  x     / x     / 202 U/L / x     / 25.5 ng/mL      Urinalysis Basic - ( 15 Oct 2023 06:53 )    Color: x / Appearance: x / SG: x / pH: x  Gluc: 99 mg/dL / Ketone: x  / Bili: x / Urobili: x   Blood: x / Protein: x / Nitrite: x   Leuk Esterase: x / RBC: x / WBC x   Sq Epi: x / Non Sq Epi: x / Bacteria: x          RADIOLOGY & ADDITIONAL TESTS:  Results Reviewed:   Imaging Personally Reviewed:  Electrocardiogram Personally Reviewed:    COORDINATION OF CARE:  Care Discussed with Consultants/Other Providers [Y/N]: Y  Prior or Outpatient Records Reviewed [Y/N]: Y   [facility] Division of Hospital Medicine  Lea DO Narendra  Pager (M-F, 8A-5P): MS Teams PREFERRED      SUBJECTIVE / OVERNIGHT EVENTS: Patient appears comfortable. No acute complaints. Denies chest pain, dyspnea, N/V.  ADDITIONAL REVIEW OF SYSTEMS:    MEDICATIONS  (STANDING):  aspirin enteric coated 81 milliGRAM(s) Oral daily  atorvastatin 80 milliGRAM(s) Oral at bedtime  buMETAnide 2 milliGRAM(s) Oral daily  clopidogrel Tablet 75 milliGRAM(s) Oral daily  dextrose 5%. 1000 milliLiter(s) (50 mL/Hr) IV Continuous <Continuous>  dextrose 5%. 1000 milliLiter(s) (100 mL/Hr) IV Continuous <Continuous>  dextrose 50% Injectable 12.5 Gram(s) IV Push once  dextrose 50% Injectable 25 Gram(s) IV Push once  dextrose 50% Injectable 25 Gram(s) IV Push once  glucagon  Injectable 1 milliGRAM(s) IntraMuscular once  heparin   Injectable 5000 Unit(s) SubCutaneous every 8 hours  insulin glargine Injectable (LANTUS) 40 Unit(s) SubCutaneous at bedtime  insulin lispro (ADMELOG) corrective regimen sliding scale   SubCutaneous at bedtime  insulin lispro (ADMELOG) corrective regimen sliding scale   SubCutaneous three times a day before meals  insulin lispro Injectable (ADMELOG) 20 Unit(s) SubCutaneous three times a day before meals  metoprolol succinate ER 25 milliGRAM(s) Oral daily  pantoprazole    Tablet 40 milliGRAM(s) Oral before breakfast  tamsulosin 0.4 milliGRAM(s) Oral at bedtime    MEDICATIONS  (PRN):  acetaminophen     Tablet .. 650 milliGRAM(s) Oral every 6 hours PRN Temp greater or equal to 38C (100.4F), Mild Pain (1 - 3)  albuterol    90 MICROgram(s) HFA Inhaler 2 Puff(s) Inhalation every 6 hours PRN Shortness of Breath and/or Wheezing  dextrose Oral Gel 15 Gram(s) Oral once PRN Blood Glucose LESS THAN 70 milliGRAM(s)/deciliter  guaiFENesin Oral Liquid (Sugar-Free) 100 milliGRAM(s) Oral every 6 hours PRN Cough      I&O's Summary    14 Oct 2023 07:01  -  15 Oct 2023 07:00  --------------------------------------------------------  IN: 720 mL / OUT: 0 mL / NET: 720 mL    15 Oct 2023 07:01  -  15 Oct 2023 08:00  --------------------------------------------------------  IN: 0 mL / OUT: 200 mL / NET: -200 mL        PHYSICAL EXAM:  Vital Signs Last 24 Hrs  T(C): 36.9 (14 Oct 2023 20:15), Max: 36.9 (14 Oct 2023 20:15)  T(F): 98.5 (14 Oct 2023 20:15), Max: 98.5 (14 Oct 2023 20:15)  HR: 125 (14 Oct 2023 20:15) (118 - 125)  BP: 114/78 (14 Oct 2023 20:15) (100/57 - 114/78)  BP(mean): --  RR: 18 (14 Oct 2023 20:15) (18 - 18)  SpO2: 91% (14 Oct 2023 20:15) (91% - 92%)    Parameters below as of 14 Oct 2023 20:15  Patient On (Oxygen Delivery Method): room air    CONSTITUTIONAL: NAD, well-developed, well-groomed  EYES: Conjunctiva and sclera clear  ENMT: Moist oral mucosa, no pharyngeal injection or exudates   NECK: Supple, midline  RESPIRATORY: Normal respiratory effort; lungs are clear to auscultation bilaterally  CARDIOVASCULAR: Regular rate and rhythm, normal S1 and S2, no murmur/rub/gallop; No lower extremity edema; Peripheral pulses are 2+ bilaterally  ABDOMEN: Nontender to palpation, normoactive bowel sounds, no rebound/guarding  MUSCULOSKELETAL:  No clubbing or cyanosis of digits; no joint swelling or tenderness to palpation  PSYCH: A+O to person only; affect appropriate      LABS:                        11.7   10.62 )-----------( 384      ( 14 Oct 2023 06:18 )             37.0     10-15    136  |  103  |  26<H>  ----------------------------<  99  3.4<L>   |  20<L>  |  1.28    Ca    8.6      15 Oct 2023 06:53  Phos  3.1     10-14  Mg     2.3     10-14    TPro  6.6  /  Alb  3.3  /  TBili  0.2  /  DBili  x   /  AST  15  /  ALT  33  /  AlkPhos  59  10-14    PT/INR - ( 14 Oct 2023 00:04 )   PT: 11.9 sec;   INR: 1.08 ratio         PTT - ( 14 Oct 2023 00:04 )  PTT:29.0 sec  CARDIAC MARKERS ( 14 Oct 2023 06:18 )  x     / x     / 202 U/L / x     / 25.5 ng/mL      Urinalysis Basic - ( 15 Oct 2023 06:53 )    Color: x / Appearance: x / SG: x / pH: x  Gluc: 99 mg/dL / Ketone: x  / Bili: x / Urobili: x   Blood: x / Protein: x / Nitrite: x   Leuk Esterase: x / RBC: x / WBC x   Sq Epi: x / Non Sq Epi: x / Bacteria: x          RADIOLOGY & ADDITIONAL TESTS:  Results Reviewed:   Imaging Personally Reviewed:  Electrocardiogram Personally Reviewed:    COORDINATION OF CARE:  Care Discussed with Consultants/Other Providers [Y/N]: Y  Prior or Outpatient Records Reviewed [Y/N]: Y

## 2023-10-15 NOTE — PATIENT PROFILE ADULT - NSFALLSECTIONLABEL_GEN_A_CORE
[facility] Medicine  Progress Note    Patient Name: João Ham  MRN: [id number]  Patient Class: IP- Inpatient   Admission Date: 6/21/2023  Length of Stay: 0 days  Attending Physician: Chris Gomez MD  Primary Care Provider: Dawson Granado MD        Subjective:     Principal Problem:Stage 5 chronic kidney disease not on chronic dialysis        HPI:  João Ham is a 43-year-old male with chronic medical problems including CKD stage 5, hypertension,HFpEF, anemia of chronic disease and CLOVIS who presents to the emergency room sent by Nephrology to start dialysis because of increasing BUN/CR.  Patient states that he has been asymptomatic.  He denies shortness or breath, chest pain, increase in swelling, continues to void several times daily, denies headaches or dizziness, abdominal pain or diarrhea, palpitations.  His only complaint is fatigue.    In the ED, vital signs with temperature 97.8°, heart rate 53, blood pressure 127/79, respiratory rate 16 and O2 sat 95%.  BUN/CR 81/11.9, sodium 135, potassium 4.2, serum bicarb 26, anion gap 11, BNP 77 and troponin 19.6, albumin 3.6 calcium 8.9, liver tests within normal limits.  H&H 9.8/30.2, WBC 4.03 and platelets 260.  Chest x-ray was negative for anything acute, 12 lead EKG with sinus bradycardia, ventricular rate 54 and .  He will be admitted to the hospitalist service for further evaluation and treatment with consult to Nephrology and vascular surgery.        Overview/Hospital Course:  No notes on file    Interval History:  No acute events overnight.  Plans for tunneled dialysis catheter today.    Review of Systems   All other systems reviewed and are negative.  Objective:     Vital Signs (Most Recent):  Temp: 97.7 °F (36.5 °C) (06/22/23 1100)  Pulse: (!) 59 (06/22/23 1100)  Resp: 14 (06/22/23 1100)  BP: 118/74 (06/22/23 1100)  SpO2: 97 % (06/22/23 1100) Vital Signs (24h Range):  Temp:  [97.5 °F (36.4 °C)-97.9 °F (36.6 °C)] 97.7  °F (36.5 °C)  Pulse:  [52-66] 59  Resp:  [14-18] 14  SpO2:  [94 %-99 %] 97 %  BP: (118-170)/() 118/74     Weight: (!) 136.1 kg (300 lb 0.7 oz)  Body mass index is 38.52 kg/m².    Intake/Output Summary (Last 24 hours) at 6/22/2023 1352  Last data filed at 6/22/2023 1124  Gross per 24 hour   Intake --   Output 2750 ml   Net -2750 ml         Physical Exam  Vitals reviewed.   Constitutional:       Appearance: Normal appearance.   HENT:      Head: Normocephalic and atraumatic.      Nose: Nose normal.      Mouth/Throat:      Mouth: Mucous membranes are moist.   Eyes:      Pupils: Pupils are equal, round, and reactive to light.   Cardiovascular:      Rate and Rhythm: Normal rate and regular rhythm.      Pulses: Normal pulses.      Heart sounds: Normal heart sounds. No murmur heard.    No friction rub. No gallop.   Pulmonary:      Effort: Pulmonary effort is normal. No respiratory distress.      Breath sounds: Normal breath sounds.   Abdominal:      General: Abdomen is flat. Bowel sounds are normal. There is no distension.      Palpations: Abdomen is soft.      Tenderness: There is no abdominal tenderness.   Musculoskeletal:         General: No swelling. Normal range of motion.      Cervical back: Normal range of motion and neck supple.   Skin:     General: Skin is warm and dry.   Neurological:      General: No focal deficit present.      Mental Status: He is alert and oriented to person, place, and time.   Psychiatric:         Mood and Affect: Mood normal.         Behavior: Behavior normal.         Thought Content: Thought content normal.         Judgment: Judgment normal.           Significant Labs: All pertinent labs within the past 24 hours have been reviewed.    Significant Imaging: I have reviewed all pertinent imaging results/findings within the past 24 hours.      Assessment/Plan:      * Stage 5 chronic kidney disease not on chronic dialysis  BUN/CR 81/11.9 with estimated GFR 5.0, baseline creatinine around  7-8 up until June 19th, sodium 135, potassium 4.2, anion gap 11, serum bicarb 26 and calcium 8.9, albumin 3.6, consult Nephrology, consult vascular surgery for tunneled dialysis catheter placement, patient NPO after midnight, renal diet tonight, 1.5 L fluid restriction, resume home medications  Discussed with Dr. Caicedo who will perform tunneled dialysis catheter  Social work consulted for dialysis placement      Resistant hypertension  Blood pressure on arrival was 176/106, at time of exam 127/79, resume home blood pressure meds, monitor blood pressure closely      Chronic heart failure with preserved ejection fraction  Most recent echocardiogram from October 2022 with estimated PA pressure 48 mmHg, mild concentric left ventricular hypertrophy grade 3 left ventricular diastolic dysfunction and mildly to moderately reduced right ventricular systolic function with severe left atrial enlargement and moderate pulmonary hypertension, mild-to-moderate mitral regurgitation, moderate-to-severe tricuspid regurgitation, estimated ejection fraction 60%      Sleep apnea  Patient does not have CPAP yet has been tested offered him CPAP always here in the hospital and he said he did not think he would need it      Severe obesity with body mass index (BMI) of 35.0 to 39.9 with comorbidity  Body mass index is 37.49 kg/m². Morbid obesity complicates all aspects of disease management from diagnostic modalities to treatment. Weight loss encouraged and health benefits explained to patient.         Anemia of chronic disease  H&H 9.8/30.2, this has been around baseline, monitor CBC daily        VTE Risk Mitigation (From admission, onward)         Ordered     IP VTE HIGH RISK PATIENT  Once         06/21/23 2038     Place sequential compression device  Until discontinued         06/21/23 2038                Discharge Planning   MARIA ISABEL: 6/25/2023     Code Status: Full Code   Is the patient medically ready for discharge?:     Reason for patient  still in hospital (select all that apply): Treatment  Discharge Plan A: Home with family                  Chris Gomez MD  Department of Hospital Medicine   Pending sale to [facility]   .

## 2023-10-15 NOTE — PROGRESS NOTE ADULT - ASSESSMENT
57 yo Male PMHx CVA with mild left sided deficits, HTN, DM2, vertigo, HLD, Mobitz II s/pp Medtronic dual chamber ICD (12/21/22) initially presented to [facility] from home with complaints of dyspnea and chest pain and was admitted w/ acute hypoxic respiratory failure. Pt found to have new cardiomyopathy, LAURA, and positive for enterorhinovirus. Pt transferred to [facility] for cardiac cath evaluation.

## 2023-10-15 NOTE — PROGRESS NOTE ADULT - SUBJECTIVE AND OBJECTIVE BOX
Cardiology Progress Note  ------------------------------------------------------------------------------------------  SUBJECTIVE:   - Tele: V-paced, no events  - No events overnight. Denies CP, SOB or Palpitations.   -------------------------------------------------------------------------------------------  ROS:  CV: chest pain (-), palpitation (-), orthopnea (-), PND (-), edema (-)  PULM: SOB (-), cough (-), wheezing (-), hemoptysis (-).   CONST: fever (-), chills (-) or fatigue (-)  GI: abdominal distension (-), abdominal pain (-) , nausea/vomiting (-), hematemesis, (-), melena (-), hematochezia (-)  : dysuria (-), frequency (-), hematuria (-).   NEURO: numbness (-), weakness (-), dizziness (-)  MSK: myalgia (-), joint pain (-)   SKIN: itching (-), rash (-)  HEENT:  visual changes (-); vertigo or throat pain (-);  neck stiffness (-)   Psych: change in mood (-), anxiety (-), depression (-)     All other review of systems is negative unless indicated above.   -------------------------------------------------------------------------------------------  VS:  T(F): 98.5 (10-14), Max: 98.5 (10-14)  HR: 125 (10-14) (118 - 125)  BP: 114/78 (10-14) (100/57 - 114/78)  RR: 18 (10-14)  SpO2: 91% (10-14)  I&O's Summary    14 Oct 2023 07:01  -  15 Oct 2023 07:00  --------------------------------------------------------  IN: 720 mL / OUT: 0 mL / NET: 720 mL    15 Oct 2023 07:01  -  15 Oct 2023 08:20  --------------------------------------------------------  IN: 0 mL / OUT: 200 mL / NET: -200 mL      PHYSICAL EXAM:  GENERAL: NAD  HEAD:  Atraumatic, Normocephalic.  EYES: EOMI, PERRLA, conjunctiva and sclera clear.  ENT: Moist mucous membranes.  NECK: Supple, No JVD.  CHEST/LUNG: Clear to auscultation bilaterally; No rales, rhonchi, wheezing, or rubs. Unlabored respirations.  HEART: Regular rate and rhythm; No murmurs, rubs, or gallops.  ABDOMEN: Bowel sounds present; Soft, Nontender, Nondistended.   EXTREMITIES: No edema. 2+ Peripheral Pulses, brisk capillary refill. No clubbing or cyanosis.   PSYCH: Normal affect.  SKIN: No rashes or lesions.  -------------------------------------------------------------------------------------------  LABS:                          11.7   10.62 )-----------( 384      ( 14 Oct 2023 06:18 )             37.0     10-15    136  |  103  |  26<H>  ----------------------------<  99  3.4<L>   |  20<L>  |  1.28    Ca    8.6      15 Oct 2023 06:53  Phos  3.1     10-14  Mg     2.3     10-14    TPro  6.6  /  Alb  3.3  /  TBili  0.2  /  DBili  x   /  AST  15  /  ALT  33  /  AlkPhos  59  10-14    PT/INR - ( 14 Oct 2023 00:04 )   PT: 11.9 sec;   INR: 1.08 ratio         PTT - ( 14 Oct 2023 00:04 )  PTT:29.0 sec  CARDIAC MARKERS ( 14 Oct 2023 06:18 )  752 ng/L / x     / x     / 202 U/L / x     / 25.5 ng/mL            -------------------------------------------------------------------------------------------  Meds:  acetaminophen     Tablet .. 650 milliGRAM(s) Oral every 6 hours PRN  albuterol    90 MICROgram(s) HFA Inhaler 2 Puff(s) Inhalation every 6 hours PRN  aspirin enteric coated 81 milliGRAM(s) Oral daily  atorvastatin 80 milliGRAM(s) Oral at bedtime  buMETAnide 2 milliGRAM(s) Oral daily  clopidogrel Tablet 75 milliGRAM(s) Oral daily  dextrose 5%. 1000 milliLiter(s) IV Continuous <Continuous>  dextrose 5%. 1000 milliLiter(s) IV Continuous <Continuous>  dextrose 50% Injectable 12.5 Gram(s) IV Push once  dextrose 50% Injectable 25 Gram(s) IV Push once  dextrose 50% Injectable 25 Gram(s) IV Push once  dextrose Oral Gel 15 Gram(s) Oral once PRN  glucagon  Injectable 1 milliGRAM(s) IntraMuscular once  guaiFENesin Oral Liquid (Sugar-Free) 100 milliGRAM(s) Oral every 6 hours PRN  heparin   Injectable 5000 Unit(s) SubCutaneous every 8 hours  insulin glargine Injectable (LANTUS) 30 Unit(s) SubCutaneous at bedtime  insulin lispro (ADMELOG) corrective regimen sliding scale   SubCutaneous three times a day before meals  insulin lispro (ADMELOG) corrective regimen sliding scale   SubCutaneous at bedtime  insulin lispro Injectable (ADMELOG) 10 Unit(s) SubCutaneous three times a day before meals  metoprolol succinate ER 25 milliGRAM(s) Oral daily  pantoprazole    Tablet 40 milliGRAM(s) Oral before breakfast  tamsulosin 0.4 milliGRAM(s) Oral at bedtime        -------------------------------------------------------------------------------------------

## 2023-10-15 NOTE — PROGRESS NOTE ADULT - PROBLEM SELECTOR PLAN 2
- TTE done 9/26 showing EF 20% with severely decreased LVSF, multiple wall motion abnormalities, elevated LV end diastolic pressure.   - Findings concerning for ischemic etiology  - Maintain K>4, Mg >2,Phos>3  - Strict I/Os, daily weights  - Continue metoprolol succinate 25mg qd   - Continue Bumex 2mg PO daily   - Cardiology following, planning for [facility].

## 2023-10-15 NOTE — PROGRESS NOTE ADULT - NSPROGADDITIONALINFOA_GEN_ALL_CORE
Time-based billing (NON-critical care).      minutes spent on total encounter. The necessity of the time spent during the encounter on this date of service was due to:     - Ordering, reviewing, and interpreting labs, testing, and imaging.  - Independently obtaining a review of systems and performing a physical exam  - Reviewing prior records and where necessary, outpatient records.  - Counselling and educating patient regarding interpretation of aforementioned items and plan of care.    10/14: Discussed with wife, Mame, ([phone]) via phone. Discussed that patient oriented to self only. She reports that is his baseline - he has been forgetful since CVA and mentation waxes and wanes.     d/w ACP Time-based billing (NON-critical care).     35 minutes spent on total encounter. The necessity of the time spent during the encounter on this date of service was due to:     - Ordering, reviewing, and interpreting labs, testing, and imaging.  - Independently obtaining a review of systems and performing a physical exam  - Reviewing prior records and where necessary, outpatient records.  - Counselling and educating patient regarding interpretation of aforementioned items and plan of care.    10/14: Discussed with wife, Mame, ([phone]) via phone. Discussed that patient oriented to self only. She reports that is his baseline - he has been forgetful since CVA and mentation waxes and wanes.     d/w ACP

## 2023-10-15 NOTE — PROGRESS NOTE ADULT - ASSESSMENT
57 yo Male pmhx CVA with mild left sided deficits, HTN, DM2, vertigo, HLD, Mobitz II s/p Medtronic dual chamber ICD, newly diagnosed HFrEF (EF 20%) who presents as a transfer from [facility] after admission for ADHF/PNA now transferred for ischemic eval in setting of newly depressed EF with noted WMA.     #HFrEF (EF 20%) - compensated on exam  - Will need up-titration of GDMT pending renal function. Unclear Cr baseline, LAURA at OSH has been improving on diuretics.   - Continue Toprol XL 25 mg  - addition of ARB/ACE-/ARNI pending resolution of LAURA, planned LHC as below, and limited by borderline /50's  - monitor tele  - replete K4Mg2  - Bumex 2 mg PO for now - monitor strict I&O and daily weights.   - Ischemic eval as below    #NSTEMI  - trop peaked at 24,293, currently chest pain free and HDS.  - Continue DAPT w/ ASA/Plavix. Lipitor 80 mg  - Plan for LHC - patient would like his wife to be present to discuss further before consenting to procedure

## 2023-10-15 NOTE — PROGRESS NOTE ADULT - PROBLEM SELECTOR PLAN 6
A1C 6.9. FS >200s. Was getting insulin at [facility]  - Low insulin sliding scale  - FS before meals and at bedtime  - CC diet    Recheck Hgb A1c. Lower Lantus 30units QHS and Admelog premeal 10u TID as glucose readings borderline low. A1C 6.9. FS >200s. Was getting insulin at [facility]  - Low insulin sliding scale  - FS before meals and at bedtime  - CC diet    Recheck Hgb A1c.   Lower Lantus 30units QHS and Admelog premeal 10u TID as glucose readings borderline low.

## 2023-10-16 DIAGNOSIS — I10 ESSENTIAL (PRIMARY) HYPERTENSION: ICD-10-CM

## 2023-10-16 DIAGNOSIS — I25.10 ATHEROSCLEROTIC HEART DISEASE OF NATIVE CORONARY ARTERY WITHOUT ANGINA PECTORIS: ICD-10-CM

## 2023-10-16 DIAGNOSIS — Z02.9 ENCOUNTER FOR ADMINISTRATIVE EXAMINATIONS, UNSPECIFIED: ICD-10-CM

## 2023-10-16 DIAGNOSIS — E78.5 HYPERLIPIDEMIA, UNSPECIFIED: ICD-10-CM

## 2023-10-16 DIAGNOSIS — N40.0 BENIGN PROSTATIC HYPERPLASIA WITHOUT LOWER URINARY TRACT SYMPTOMS: ICD-10-CM

## 2023-10-16 LAB
ANION GAP SERPL CALC-SCNC: 12 MMOL/L — SIGNIFICANT CHANGE UP (ref 5–17)
BUN SERPL-MCNC: 33 MG/DL — HIGH (ref 7–23)
CALCIUM SERPL-MCNC: 8.7 MG/DL — SIGNIFICANT CHANGE UP (ref 8.4–10.5)
CHLORIDE SERPL-SCNC: 106 MMOL/L — SIGNIFICANT CHANGE UP (ref 96–108)
CO2 SERPL-SCNC: 21 MMOL/L — LOW (ref 22–31)
CREAT SERPL-MCNC: 1.57 MG/DL — HIGH (ref 0.5–1.3)
EGFR: 51 ML/MIN/1.73M2 — LOW
GLUCOSE BLDC GLUCOMTR-MCNC: 115 MG/DL — HIGH (ref 70–99)
GLUCOSE BLDC GLUCOMTR-MCNC: 187 MG/DL — HIGH (ref 70–99)
GLUCOSE BLDC GLUCOMTR-MCNC: 187 MG/DL — HIGH (ref 70–99)
GLUCOSE BLDC GLUCOMTR-MCNC: 210 MG/DL — HIGH (ref 70–99)
GLUCOSE BLDC GLUCOMTR-MCNC: 210 MG/DL — HIGH (ref 70–99)
GLUCOSE BLDC GLUCOMTR-MCNC: 89 MG/DL — SIGNIFICANT CHANGE UP (ref 70–99)
GLUCOSE SERPL-MCNC: 97 MG/DL — SIGNIFICANT CHANGE UP (ref 70–99)
POTASSIUM SERPL-MCNC: 3.8 MMOL/L — SIGNIFICANT CHANGE UP (ref 3.5–5.3)
POTASSIUM SERPL-SCNC: 3.8 MMOL/L — SIGNIFICANT CHANGE UP (ref 3.5–5.3)
SODIUM SERPL-SCNC: 139 MMOL/L — SIGNIFICANT CHANGE UP (ref 135–145)

## 2023-10-16 PROCEDURE — 99152 MOD SED SAME PHYS/QHP 5/>YRS: CPT

## 2023-10-16 PROCEDURE — 99233 SBSQ HOSP IP/OBS HIGH 50: CPT

## 2023-10-16 PROCEDURE — 93010 ELECTROCARDIOGRAM REPORT: CPT

## 2023-10-16 PROCEDURE — 99233 SBSQ HOSP IP/OBS HIGH 50: CPT | Mod: GC

## 2023-10-16 PROCEDURE — 93454 CORONARY ARTERY ANGIO S&I: CPT | Mod: 26

## 2023-10-16 PROCEDURE — 99222 1ST HOSP IP/OBS MODERATE 55: CPT

## 2023-10-16 PROCEDURE — 93306 TTE W/DOPPLER COMPLETE: CPT | Mod: 26

## 2023-10-16 RX ADMIN — HEPARIN SODIUM 5000 UNIT(S): 5000 INJECTION INTRAVENOUS; SUBCUTANEOUS at 11:41

## 2023-10-16 RX ADMIN — CLOPIDOGREL BISULFATE 75 MILLIGRAM(S): 75 TABLET, FILM COATED ORAL at 11:38

## 2023-10-16 RX ADMIN — Medication 10 UNIT(S): at 17:33

## 2023-10-16 RX ADMIN — HEPARIN SODIUM 5000 UNIT(S): 5000 INJECTION INTRAVENOUS; SUBCUTANEOUS at 05:36

## 2023-10-16 RX ADMIN — BUMETANIDE 2 MILLIGRAM(S): 0.25 INJECTION INTRAMUSCULAR; INTRAVENOUS at 05:35

## 2023-10-16 RX ADMIN — HEPARIN SODIUM 5000 UNIT(S): 5000 INJECTION INTRAVENOUS; SUBCUTANEOUS at 22:09

## 2023-10-16 RX ADMIN — PANTOPRAZOLE SODIUM 40 MILLIGRAM(S): 20 TABLET, DELAYED RELEASE ORAL at 05:36

## 2023-10-16 RX ADMIN — ATORVASTATIN CALCIUM 80 MILLIGRAM(S): 80 TABLET, FILM COATED ORAL at 22:08

## 2023-10-16 RX ADMIN — INSULIN GLARGINE 30 UNIT(S): 100 INJECTION, SOLUTION SUBCUTANEOUS at 22:09

## 2023-10-16 RX ADMIN — Medication 81 MILLIGRAM(S): at 11:37

## 2023-10-16 RX ADMIN — Medication 25 MILLIGRAM(S): at 05:35

## 2023-10-16 RX ADMIN — Medication 1: at 17:32

## 2023-10-16 RX ADMIN — Medication 10 UNIT(S): at 08:34

## 2023-10-16 RX ADMIN — Medication 10 UNIT(S): at 11:43

## 2023-10-16 RX ADMIN — TAMSULOSIN HYDROCHLORIDE 0.4 MILLIGRAM(S): 0.4 CAPSULE ORAL at 22:09

## 2023-10-16 NOTE — PROGRESS NOTE ADULT - ASSESSMENT
58M hx CVA, HTN, DM2, vertigo, HLD, Mobitz II s/p ICD p/w NSTEMI to [facility], transferred to [facility] for LHC

## 2023-10-16 NOTE — CHART NOTE - NSCHARTNOTEFT_GEN_A_CORE
HPI  59 yo Male pmhx CVA with mild left sided deficits, HTN, DM2, BPH, HLD, Mobitz II s/p Medtronic dual chamber ICD, newly diagnosed HFrEF (EF 20%) who presents as a transfer from [facility] after admission for ADHF/PNA/LAURA/ NSTEMI now transferred for ischemic eval in setting of newly depressed EF with noted WMA. Pt transferred from [facility] for ischemic workup.   Pt is s/p cath today which revealed multi-vessel cad via RRA.  Site dry and intact, band-aid in placed, no ecchymosis or hematoma noted.  +2 palpable pulse present.  Pt denies numbness or tingling.  Will continue to monitor pt overnight.     Vital Signs Last 24 Hrs  T(C): 36.9 (16 Oct 2023 19:47), Max: 37.3 (16 Oct 2023 17:40)  T(F): 98.4 (16 Oct 2023 19:47), Max: 99.1 (16 Oct 2023 17:40)  HR: 117 (16 Oct 2023 19:47) (108 - 123)  BP: 109/76 (16 Oct 2023 19:47) (91/61 - 109/77)  BP(mean): --  RR: 16 (16 Oct 2023 19:47) (14 - 18)  SpO2: 96% (16 Oct 2023 19:47) (92% - 98%)    Parameters below as of 16 Oct 2023 19:47  Patient On (Oxygen Delivery Method): room air    Emelin Reyes [place]one[place], NP   Medicine Department   Spectralink [phone]

## 2023-10-16 NOTE — PHARMACOTHERAPY INTERVENTION NOTE - COMMENTS
Confirmed home medications with patient, spouse, and [facility] pharmacy, updated in Outpatient Medication Review.     Home medications (prior to [facility] admission):  alfuzosin 10 mg oral tablet, extended release: 1 tab(s) orally once a day (at bedtime)   amLODIPine 10 mg oral tablet: 1 tab(s) orally once a day   aspirin 81 mg oral tablet: 1 tab(s) orally once a day   atorvastatin 80 mg oral tablet: 1 tab(s) orally once a day   ertugliflozin 15 mg oral tablet: 1 tab(s) orally once a day   finasteride 5 mg oral tablet: 1 tab(s) orally once a day   Fish oil: 1 capsule by mouth once a day  glipiZIDE 5 mg oral tablet, extended release: 1 tab(s) orally once a day   losartan 100 mg oral tablet: 1 tab(s) orally once a day   metFORMIN 1000 mg oral tablet: 1 tab(s) orally 2 times a day    Felecia Harris, PharmD, BCPS  Clinical Pharmacy Specialist  [phone] or Teams

## 2023-10-16 NOTE — PROGRESS NOTE ADULT - PROBLEM SELECTOR PLAN 3
Creatinine range 1.27-1.57 at [facility]- [facility]

## 2023-10-16 NOTE — PROGRESS NOTE ADULT - SUBJECTIVE AND OBJECTIVE BOX
William Garcia MD    Patient is a 58y old  Male who presents with a chief complaint of NSTEMI (16 Oct 2023 06:21)        SUBJECTIVE / OVERNIGHT EVENTS: Patient seen and examined. No new events/complaints  TELEMETRY: /-140's      MEDICATIONS  (STANDING):  aspirin enteric coated 81 milliGRAM(s) Oral daily  atorvastatin 80 milliGRAM(s) Oral at bedtime  buMETAnide 2 milliGRAM(s) Oral daily  clopidogrel Tablet 75 milliGRAM(s) Oral daily  dextrose 5%. 1000 milliLiter(s) (100 mL/Hr) IV Continuous <Continuous>  dextrose 5%. 1000 milliLiter(s) (50 mL/Hr) IV Continuous <Continuous>  dextrose 50% Injectable 12.5 Gram(s) IV Push once  dextrose 50% Injectable 25 Gram(s) IV Push once  dextrose 50% Injectable 25 Gram(s) IV Push once  glucagon  Injectable 1 milliGRAM(s) IntraMuscular once  heparin   Injectable 5000 Unit(s) SubCutaneous every 8 hours  insulin glargine Injectable (LANTUS) 30 Unit(s) SubCutaneous at bedtime  insulin lispro (ADMELOG) corrective regimen sliding scale   SubCutaneous at bedtime  insulin lispro (ADMELOG) corrective regimen sliding scale   SubCutaneous three times a day before meals  insulin lispro Injectable (ADMELOG) 10 Unit(s) SubCutaneous three times a day before meals  metoprolol succinate ER 25 milliGRAM(s) Oral daily  pantoprazole    Tablet 40 milliGRAM(s) Oral before breakfast  tamsulosin 0.4 milliGRAM(s) Oral at bedtime    MEDICATIONS  (PRN):  acetaminophen     Tablet .. 650 milliGRAM(s) Oral every 6 hours PRN Temp greater or equal to 38C (100.4F), Mild Pain (1 - 3)  albuterol    90 MICROgram(s) HFA Inhaler 2 Puff(s) Inhalation every 6 hours PRN Shortness of Breath and/or Wheezing  dextrose Oral Gel 15 Gram(s) Oral once PRN Blood Glucose LESS THAN 70 milliGRAM(s)/deciliter  guaiFENesin Oral Liquid (Sugar-Free) 100 milliGRAM(s) Oral every 6 hours PRN Cough      Vital Signs Last 24 Hrs  T(C): 36.7 (16 Oct 2023 11:20), Max: 37.4 (15 Oct 2023 21:51)  T(F): 98 (16 Oct 2023 11:20), Max: 99.3 (15 Oct 2023 21:51)  HR: 114 (16 Oct 2023 11:20) (114 - 124)  BP: 100/66 (16 Oct 2023 11:20) (94/62 - 110/76)  BP(mean): --  RR: 18 (16 Oct 2023 11:20) (18 - 18)  SpO2: 94% (16 Oct 2023 11:20) (93% - 96%)    Parameters below as of 16 Oct 2023 11:20  Patient On (Oxygen Delivery Method): room air      CAPILLARY BLOOD GLUCOSE      POCT Blood Glucose.: 115 mg/dL (16 Oct 2023 11:34)  POCT Blood Glucose.: 89 mg/dL (16 Oct 2023 07:51)  POCT Blood Glucose.: 124 mg/dL (15 Oct 2023 21:53)  POCT Blood Glucose.: 233 mg/dL (15 Oct 2023 18:15)  POCT Blood Glucose.: 227 mg/dL (15 Oct 2023 12:44)    I&O's Summary    15 Oct 2023 07:01  -  16 Oct 2023 07:00  --------------------------------------------------------  IN: 720 mL / OUT: 200 mL / NET: 520 mL    16 Oct 2023 07:01  -  16 Oct 2023 11:39  --------------------------------------------------------  IN: 240 mL / OUT: 0 mL / NET: 240 mL          PHYSICAL EXAM  GENERAL: NAD, well-developed  HEAD:  Atraumatic, normocephalic  EYES: EOMI, PERRLA, conjunctiva and sclera clear  CHEST/LUNG: Clear to auscultation bilaterally; no wheezes  HEART: +S1+S2, regular rate and rhythm  ABDOMEN: Soft, nontender, nondistended; bowel sounds present  EXTREMITIES:  2+ peripheral pulses; no clubbing, cyanosis, or edema  PSYCH: AAOx2-3      LABS:    10-16    139  |  106  |  33<H>  ----------------------------<  97  3.8   |  21<L>  |  1.57<H>    Ca    8.7      16 Oct 2023 05:52            Urinalysis Basic - ( 16 Oct 2023 05:52 )    Color: x / Appearance: x / SG: x / pH: x  Gluc: 97 mg/dL / Ketone: x  / Bili: x / Urobili: x   Blood: x / Protein: x / Nitrite: x   Leuk Esterase: x / RBC: x / WBC x   Sq Epi: x / Non Sq Epi: x / Bacteria: x          RADIOLOGY & ADDITIONAL TESTS:    Imaging Personally Reviewed:  Consultant(s) Notes Reviewed:    Care Discussed with Consultants/Other Providers:

## 2023-10-16 NOTE — CONSULT NOTE ADULT - SUBJECTIVE AND OBJECTIVE BOX
History of Present Illness:  58y Male    Past Medical History  CVA (cerebrovascular accident)    T2DM (type 2 diabetes mellitus)    HTN (hypertension)    HLD (hyperlipidemia)        Past Surgical History  S/P cystoscopy    S/P hernia repair        MEDICATIONS  (STANDING):  aspirin enteric coated 81 milliGRAM(s) Oral daily  atorvastatin 80 milliGRAM(s) Oral at bedtime  buMETAnide 2 milliGRAM(s) Oral daily  dextrose 5%. 1000 milliLiter(s) (100 mL/Hr) IV Continuous <Continuous>  dextrose 5%. 1000 milliLiter(s) (50 mL/Hr) IV Continuous <Continuous>  dextrose 50% Injectable 12.5 Gram(s) IV Push once  dextrose 50% Injectable 25 Gram(s) IV Push once  dextrose 50% Injectable 25 Gram(s) IV Push once  glucagon  Injectable 1 milliGRAM(s) IntraMuscular once  heparin   Injectable 5000 Unit(s) SubCutaneous every 8 hours  insulin glargine Injectable (LANTUS) 30 Unit(s) SubCutaneous at bedtime  insulin lispro (ADMELOG) corrective regimen sliding scale   SubCutaneous three times a day before meals  insulin lispro (ADMELOG) corrective regimen sliding scale   SubCutaneous at bedtime  insulin lispro Injectable (ADMELOG) 10 Unit(s) SubCutaneous three times a day before meals  metoprolol succinate ER 25 milliGRAM(s) Oral daily  pantoprazole    Tablet 40 milliGRAM(s) Oral before breakfast  tamsulosin 0.4 milliGRAM(s) Oral at bedtime    MEDICATIONS  (PRN):  acetaminophen     Tablet .. 650 milliGRAM(s) Oral every 6 hours PRN Temp greater or equal to 38C (100.4F), Mild Pain (1 - 3)  albuterol    90 MICROgram(s) HFA Inhaler 2 Puff(s) Inhalation every 6 hours PRN Shortness of Breath and/or Wheezing  dextrose Oral Gel 15 Gram(s) Oral once PRN Blood Glucose LESS THAN 70 milliGRAM(s)/deciliter  guaiFENesin Oral Liquid (Sugar-Free) 100 milliGRAM(s) Oral every 6 hours PRN Cough    Antiplatelet therapy:                           Last dose/amt:    Allergies: No Known Allergies      SOCIAL HISTORY:  Smoker: [ ] Yes  [ ] No        PACK YEARS:                         WHEN QUIT?  ETOH use: [ ] Yes  [ ] No              FREQUENCY / QUANTITY:  Ilicit Drug use:  [ ] Yes  [ ] No  Occupation:  Live with:  Assist device use:    Relevant Family History  FAMILY HISTORY:      Review of Systems  GENERAL:  Fevers[] chills[] sweats[] fatigue[] weight loss[] weight gain []                                        NEURO:  parathesias[] seizures []  syncope []  confusion []                                                                                  EYES: glasses[]  blurry vision[]  discharge[] pain[] glaucoma []                                                                            ENMT:  difficulty hearing []  vertigo[]  dysphagia[] epistaxis[] recent dental work []                                      CV:  chest pain[] palpitations[] LOVE [] diaphoresis [] edema[]                                                                                             RESPIRATORY:  wheezing[] SOB[] cough [] sputum[] hemoptysis[]                                                                    GI:  nausea[]  vomiting []  diarrhea[] constipation [] melena []                                                                        : hematuria[ ]  dysuria[ ] urgency[] incontinence[]                                                                                              MUSKULOSKELETAL:  arthritis[ ]  joint swelling [ ] muscle weakness [ ]                                                                  SKIN/BREAST:  rash[ ] itching [ ]  hair loss[ ] masses[ ]                                                                                                PSYCH:  dementia [ ] depresion [ ] anxiety[ ]                                                                                                                  HEME/LYMPH:  bruises easily[ ] enlarged lymph nodes[ ] tender lymph nodes[ ]                                                 ENDOCRINE:  cold intolerance[ ] heat intolerance[ ] polydipsia[ ]                                                                              PHYSICAL EXAM  Vital Signs Last 24 Hrs  T(C): 36.7 (16 Oct 2023 15:10), Max: 37.4 (15 Oct 2023 21:51)  T(F): 98.1 (16 Oct 2023 15:10), Max: 99.3 (15 Oct 2023 21:51)  HR: 112 (16 Oct 2023 16:26) (108 - 123)  BP: 94/55 (16 Oct 2023 16:26) (91/61 - 110/76)  BP(mean): --  RR: 14 (16 Oct 2023 16:26) (14 - 18)  SpO2: 95% (16 Oct 2023 16:26) (92% - 98%)    Parameters below as of 16 Oct 2023 16:26  Patient On (Oxygen Delivery Method): nasal cannula  O2 Flow (L/min): 2      General: Well nourished, well developed, no acute distress.                                                         Neuro: Normal exam oriented to person/place & time with no focal motor or sensory  deficits.                    Eyes: Normal exam of conjunctiva & lids, pupils equally reactive.   ENT: Normal exam of nasal/oral mucosa with absence of cyanosis.   Neck: Normal exam of jugular veins, trachea & thyroid.   Chest: Normal lung exam with good air movement absence of wheezes, rales, or rhonchi:                                                                          CV:  Auscultation: normal [ ] S3[ ] S4[ ] Irregular [ ] Rub[ ] Clicks[ ]  Murmurs none:[ ]systolic [ ]  diastolic [ ] holosystolic [ ]  Carotids: No Bruits[ ] Other____________ Abdominal Aorta: normal [ ] nonpalpable[ ]                                                                         GI: Normal exam of abdomen, liver & spleen with no noted masses or tenderness.                                                                                              Extremities: Normal no evidence of cyanosis or deformity Edema: none[ ]trace[ ]1+[ ]2+[ ]3+[ ]4+[ ]  Lower Extremity Pulses: Right[ ] Left[ ]Varicosities[ ]  SKIN : Normal exam to inspection & palation.                                                           LABS:    10-16    139  |  106  |  33<H>  ----------------------------<  97  3.8   |  21<L>  |  1.57<H>    Ca    8.7      16 Oct 2023 05:52        Urinalysis Basic - ( 16 Oct 2023 05:52 )    Color: x / Appearance: x / SG: x / pH: x  Gluc: 97 mg/dL / Ketone: x  / Bili: x / Urobili: x   Blood: x / Protein: x / Nitrite: x   Leuk Esterase: x / RBC: x / WBC x   Sq Epi: x / Non Sq Epi: x / Bacteria: x              Cardiac Cath:    TTE / EMILEE:    Assessment:  58y Male presents with     Plan:             HPI: 58M pmhx CVA with mild left sided deficits, HTN, DM2, BPH, HLD, Mobitz II s/p Medtronic dual chamber ICD, newly diagnosed HFrEF (EF 20%) who presents as a transfer from [facility] after admission for ADHF/PNA/LAURA/ NSTEMI now transferred for ischemic eval in setting of newly depressed EF with noted WMA. Pt transferred from [facility] for ischemic workup; s/p cath today which revealed multi-vessel cad; Pt referred for CABG with Dr. Siegel. Denies CP/ SOB at this time; Pt will need preop workup including viability study and CHF consult.       Past Medical History  CVA (cerebrovascular accident)- with residual left side weakness     T2DM (type 2 diabetes mellitus)    HTN (hypertension)    HLD (hyperlipidemia)    BPH    s/p NSTEMI    recent PNA    CHF ef 20%    LAURA     Past Surgical History  S/P cystoscopy    S/P hernia repair    s/p medtronic AICD         MEDICATIONS  (STANDING):  aspirin enteric coated 81 milliGRAM(s) Oral daily  atorvastatin 80 milliGRAM(s) Oral at bedtime  buMETAnide 2 milliGRAM(s) Oral daily  dextrose 5%. 1000 milliLiter(s) (100 mL/Hr) IV Continuous <Continuous>  dextrose 5%. 1000 milliLiter(s) (50 mL/Hr) IV Continuous <Continuous>  dextrose 50% Injectable 12.5 Gram(s) IV Push once  dextrose 50% Injectable 25 Gram(s) IV Push once  dextrose 50% Injectable 25 Gram(s) IV Push once  glucagon  Injectable 1 milliGRAM(s) IntraMuscular once  heparin   Injectable 5000 Unit(s) SubCutaneous every 8 hours  insulin glargine Injectable (LANTUS) 30 Unit(s) SubCutaneous at bedtime  insulin lispro (ADMELOG) corrective regimen sliding scale   SubCutaneous three times a day before meals  insulin lispro (ADMELOG) corrective regimen sliding scale   SubCutaneous at bedtime  insulin lispro Injectable (ADMELOG) 10 Unit(s) SubCutaneous three times a day before meals  metoprolol succinate ER 25 milliGRAM(s) Oral daily  pantoprazole    Tablet 40 milliGRAM(s) Oral before breakfast  tamsulosin 0.4 milliGRAM(s) Oral at bedtime    MEDICATIONS  (PRN):  acetaminophen     Tablet .. 650 milliGRAM(s) Oral every 6 hours PRN Temp greater or equal to 38C (100.4F), Mild Pain (1 - 3)  albuterol    90 MICROgram(s) HFA Inhaler 2 Puff(s) Inhalation every 6 hours PRN Shortness of Breath and/or Wheezing  dextrose Oral Gel 15 Gram(s) Oral once PRN Blood Glucose LESS THAN 70 milliGRAM(s)/deciliter  guaiFENesin Oral Liquid (Sugar-Free) 100 milliGRAM(s) Oral every 6 hours PRN Cough    Antiplatelet therapy:           plavix                 Last dose/amt: 75 mg po qd- d/c 10/16    Allergies: No Known Allergies      SOCIAL HISTORY:  Smoker: [ ] Yes  [x ] No        PACK YEARS:                         WHEN QUIT?  ETOH use: [ ] Yes  [x ] No              FREQUENCY / QUANTITY:  Ilicit Drug use:  [ ] Yes  [ x] No  Occupation: [employer]   Live with: wife   Assist device use: none     Relevant Family History  FAMILY HISTORY:      Review of Systems  GENERAL:  Fevers[] chills[] sweats[] fatigue[] weight loss[] weight gain []                                        NEURO:  parathesias[] seizures []  syncope []  confusion []                                                                                  EYES: glasses[]  blurry vision[]  discharge[] pain[] glaucoma []                                                                            ENMT:  difficulty hearing []  vertigo[]  dysphagia[] epistaxis[] recent dental work []                                      CV:  chest pain[x] palpitations[] LOVE [] diaphoresis [] edema[]                                                                                             RESPIRATORY:  wheezing[] SOB[x] cough [] sputum[] hemoptysis[]                                                                    GI:  nausea[]  vomiting []  diarrhea[] constipation [] melena []                                                                        : hematuria[ ]  dysuria[ ] urgency[] incontinence[]                                                                                              MUSKULOSKELETAL:  arthritis[ ]  joint swelling [ ] muscle weakness [ ]                                                                  SKIN/BREAST:  rash[ ] itching [ ]  hair loss[ ] masses[ ]                                                                                                PSYCH:  dementia [ ] depresion [ ] anxiety[ ]                                                                                                                  HEME/LYMPH:  bruises easily[ ] enlarged lymph nodes[ ] tender lymph nodes[ ]                                                 ENDOCRINE:  cold intolerance[ ] heat intolerance[ ] polydipsia[ ]                                                                                  PHYSICAL EXAM  Vital Signs Last 24 Hrs  T(C): 36.7 (16 Oct 2023 15:10), Max: 37.4 (15 Oct 2023 21:51)  T(F): 98.1 (16 Oct 2023 15:10), Max: 99.3 (15 Oct 2023 21:51)  HR: 112 (16 Oct 2023 16:26) (108 - 123)  BP: 94/55 (16 Oct 2023 16:26) (91/61 - 110/76)  BP(mean): --  RR: 14 (16 Oct 2023 16:26) (14 - 18)  SpO2: 95% (16 Oct 2023 16:26) (92% - 98%)        Parameters below as of 16 Oct 2023 16:26  Patient On (Oxygen Delivery Method): nasal cannula  O2 Flow (L/min): 2        General: Well nourished, well developed, no acute distress.                                                         Neuro: Alert and oriented-3; left side weakness noted from previous cva             Eyes: Normal exam of conjunctiva & lids, pupils equally reactive.   ENT: Normal exam of nasal/oral mucosa with absence of cyanosis.   Neck: Normal exam of jugular veins, trachea & thyroid.   Chest: Normal lung exam with good air movement absence of wheezes, rales, or rhonchi:                                                                          CV:  Auscultation: normal [x ] S3[ ] S4[ ] Irregular [ ] Rub[ ] Clicks[ ]  Murmurs none:[ ]systolic [ ]  diastolic [ ] holosystolic [ ]; V.paced/ st 100-130   Carotids: No Bruits[ ] Other____________ Abdominal Aorta: normal [ ] nonpalpable[ ]                                                                         GI: Normal exam of abdomen, liver & spleen with no noted masses or tenderness.                                                                                              Extremities: Normal no evidence of cyanosis or deformity Edema: none[ ]trace[ ]1+[ ]2+[ ]3+[ ]4+[ ]  Lower Extremity Pulses: Right[ +] Left[+ ]Varicosities[neg]  SKIN : Normal exam to inspection & palation.                                                                 LABS:    10-16    139  |  106  |  33<H>  ----------------------------<  97  3.8   |  21<L>  |  1.57<H>    Ca    8.7      16 Oct 2023 05:52        Urinalysis Basic - ( 16 Oct 2023 05:52 )    Color: x / Appearance: x / SG: x / pH: x  Gluc: 97 mg/dL / Ketone: x  / Bili: x / Urobili: x   Blood: x / Protein: x / Nitrite: x   Leuk Esterase: x / RBC: x / WBC x   Sq Epi: x / Non Sq Epi: x / Bacteria: x

## 2023-10-16 NOTE — PROGRESS NOTE ADULT - SUBJECTIVE AND OBJECTIVE BOX
Cardiology Progress Note  ------------------------------------------------------------------------------------------    SUBJECTIVE/EVENTS::  - Tele: no events  - NAEO    -------------------------------------------------------------------------------------------  ROS:  CV: chest pain (-), palpitation (-), orthopnea (-), PND (-), edema (-)  PULM: SOB (-), cough (-), wheezing (-), hemoptysis (-).   CONST: fever (-), chills (-) or fatigue (-)  GI: abdominal distension (-), abdominal pain (-) , nausea/vomiting (-), hematemesis, (-), melena (-), hematochezia (-)  : dysuria (-), frequency (-), hematuria (-).   NEURO: numbness (-), weakness (-), dizziness (-)  MSK: myalgia (-), joint pain (-)   SKIN: itching (-), rash (-)  HEENT:  visual changes (-); vertigo or throat pain (-);  neck stiffness (-)   Psych: change in mood (-), anxiety (-), depression (-)     All other review of systems is negative unless indicated above.   -------------------------------------------------------------------------------------------  VS:  T(F): 98.4 (10-16), Max: 99.3 (10-15)  HR: 117 (10-16) (108 - 124)  BP: 94/62 (10-16) (94/62 - 110/76)  RR: 18 (10-16)  SpO2: 94% (10-16)  I&O's Summary    14 Oct 2023 07:01  -  15 Oct 2023 07:00  --------------------------------------------------------  IN: 720 mL / OUT: 0 mL / NET: 720 mL    15 Oct 2023 07:01  -  16 Oct 2023 06:22  --------------------------------------------------------  IN: 720 mL / OUT: 200 mL / NET: 520 mL      ------------------------------------------------------------------------------------------  PHYSICAL EXAM:  GEN: NAD, sitting in bed  HEENT: NCAT, EOMI  CV: RRR, Ns1/s2, no m/r/g, JVP not elevated  RESP: CTA B/l, no w/r/r  ABD: soft, nt, nd  Ext: warm, 2+ pulses, no edema  Neuro: AAOx3, no focal deficits    -------------------------------------------------------------------------------------------  LABS:      10-16    139  |  106  |  33<H>  ----------------------------<  97  3.8   |  21<L>  |  1.57<H>    Ca    8.7      16 Oct 2023 05:52        CARDIAC MARKERS ( 14 Oct 2023 06:18 )  752 ng/L / x     / x     / 202 U/L / x     / 25.5 ng/mL            -------------------------------------------------------------------------------------------  Meds:  acetaminophen     Tablet .. 650 milliGRAM(s) Oral every 6 hours PRN  albuterol    90 MICROgram(s) HFA Inhaler 2 Puff(s) Inhalation every 6 hours PRN  aspirin enteric coated 81 milliGRAM(s) Oral daily  atorvastatin 80 milliGRAM(s) Oral at bedtime  buMETAnide 2 milliGRAM(s) Oral daily  clopidogrel Tablet 75 milliGRAM(s) Oral daily  dextrose 5%. 1000 milliLiter(s) IV Continuous <Continuous>  dextrose 5%. 1000 milliLiter(s) IV Continuous <Continuous>  dextrose 50% Injectable 12.5 Gram(s) IV Push once  dextrose 50% Injectable 25 Gram(s) IV Push once  dextrose 50% Injectable 25 Gram(s) IV Push once  dextrose Oral Gel 15 Gram(s) Oral once PRN  glucagon  Injectable 1 milliGRAM(s) IntraMuscular once  guaiFENesin Oral Liquid (Sugar-Free) 100 milliGRAM(s) Oral every 6 hours PRN  heparin   Injectable 5000 Unit(s) SubCutaneous every 8 hours  insulin glargine Injectable (LANTUS) 30 Unit(s) SubCutaneous at bedtime  insulin lispro (ADMELOG) corrective regimen sliding scale   SubCutaneous at bedtime  insulin lispro (ADMELOG) corrective regimen sliding scale   SubCutaneous three times a day before meals  insulin lispro Injectable (ADMELOG) 10 Unit(s) SubCutaneous three times a day before meals  metoprolol succinate ER 25 milliGRAM(s) Oral daily  pantoprazole    Tablet 40 milliGRAM(s) Oral before breakfast  tamsulosin 0.4 milliGRAM(s) Oral at bedtime    -------------------------------------------------------------------------------------------  Cardiovascular Diagnostic Testing:      -------------------------------------------------------------------------------------------  Assessment and Plan:   57 yo Male pmhx CVA with mild left sided deficits, HTN, DM2, vertigo, HLD, Mobitz II s/p Medtronic dual chamber ICD, newly diagnosed HFrEF (EF 20%) who presents as a transfer from [facility] after admission for ADHF/PNA now transferred for ischemic eval in setting of newly depressed EF with noted WMA.     1. HFrEF (20%) - new finding at OSH. Started on bumex, and metop. Awaiting GDMT pending improvement in renal fx.  2. NSTEMI - trop peaked at OSH 25,000. Started on ASA/Plavix and statin.    Recommendations:  - Will discuss C today  - Continue ASA, Plavix  - Continue metop succinate 25mg  - continue bumex 2mg qd  - will start ARB/ARNI and MRA pending kidney fx    *** Recommendations are preliminary until cosigned by the attending.    Colby Santos MD  Cardiology Fellow    For all New Consults and Questions:  [email or web address]   Login: [facility] Progress Note  ------------------------------------------------------------------------------------------    SUBJECTIVE/EVENTS::  - Tele: no events  - NAEO    -------------------------------------------------------------------------------------------  ROS:  CV: chest pain (-), palpitation (-), orthopnea (-), PND (-), edema (-)  PULM: SOB (-), cough (-), wheezing (-), hemoptysis (-).   CONST: fever (-), chills (-) or fatigue (-)  GI: abdominal distension (-), abdominal pain (-) , nausea/vomiting (-), hematemesis, (-), melena (-), hematochezia (-)  : dysuria (-), frequency (-), hematuria (-).   NEURO: numbness (-), weakness (-), dizziness (-)  MSK: myalgia (-), joint pain (-)   SKIN: itching (-), rash (-)  HEENT:  visual changes (-); vertigo or throat pain (-);  neck stiffness (-)   Psych: change in mood (-), anxiety (-), depression (-)     All other review of systems is negative unless indicated above.   -------------------------------------------------------------------------------------------  VS:  T(F): 98.4 (10-16), Max: 99.3 (10-15)  HR: 117 (10-16) (108 - 124)  BP: 94/62 (10-16) (94/62 - 110/76)  RR: 18 (10-16)  SpO2: 94% (10-16)  I&O's Summary    14 Oct 2023 07:01  -  15 Oct 2023 07:00  --------------------------------------------------------  IN: 720 mL / OUT: 0 mL / NET: 720 mL    15 Oct 2023 07:01  -  16 Oct 2023 06:22  --------------------------------------------------------  IN: 720 mL / OUT: 200 mL / NET: 520 mL      ------------------------------------------------------------------------------------------  PHYSICAL EXAM:  GEN: NAD, sitting in bed  HEENT: NCAT, EOMI  CV: RRR, Ns1/s2, no m/r/g, JVP not elevated  RESP: CTA B/l, no w/r/r  ABD: soft, nt, nd  Ext: warm, 2+ pulses, no edema  Neuro: AAOx3, no focal deficits    -------------------------------------------------------------------------------------------  LABS:      10-16    139  |  106  |  33<H>  ----------------------------<  97  3.8   |  21<L>  |  1.57<H>    Ca    8.7      16 Oct 2023 05:52        CARDIAC MARKERS ( 14 Oct 2023 06:18 )  752 ng/L / x     / x     / 202 U/L / x     / 25.5 ng/mL            -------------------------------------------------------------------------------------------  Meds:  acetaminophen     Tablet .. 650 milliGRAM(s) Oral every 6 hours PRN  albuterol    90 MICROgram(s) HFA Inhaler 2 Puff(s) Inhalation every 6 hours PRN  aspirin enteric coated 81 milliGRAM(s) Oral daily  atorvastatin 80 milliGRAM(s) Oral at bedtime  buMETAnide 2 milliGRAM(s) Oral daily  clopidogrel Tablet 75 milliGRAM(s) Oral daily  dextrose 5%. 1000 milliLiter(s) IV Continuous <Continuous>  dextrose 5%. 1000 milliLiter(s) IV Continuous <Continuous>  dextrose 50% Injectable 12.5 Gram(s) IV Push once  dextrose 50% Injectable 25 Gram(s) IV Push once  dextrose 50% Injectable 25 Gram(s) IV Push once  dextrose Oral Gel 15 Gram(s) Oral once PRN  glucagon  Injectable 1 milliGRAM(s) IntraMuscular once  guaiFENesin Oral Liquid (Sugar-Free) 100 milliGRAM(s) Oral every 6 hours PRN  heparin   Injectable 5000 Unit(s) SubCutaneous every 8 hours  insulin glargine Injectable (LANTUS) 30 Unit(s) SubCutaneous at bedtime  insulin lispro (ADMELOG) corrective regimen sliding scale   SubCutaneous at bedtime  insulin lispro (ADMELOG) corrective regimen sliding scale   SubCutaneous three times a day before meals  insulin lispro Injectable (ADMELOG) 10 Unit(s) SubCutaneous three times a day before meals  metoprolol succinate ER 25 milliGRAM(s) Oral daily  pantoprazole    Tablet 40 milliGRAM(s) Oral before breakfast  tamsulosin 0.4 milliGRAM(s) Oral at bedtime    -------------------------------------------------------------------------------------------  Cardiovascular Diagnostic Testing:      -------------------------------------------------------------------------------------------  Assessment and Plan:   59 yo Male pmhx CVA with mild left sided deficits, HTN, DM2, vertigo, HLD, Mobitz II s/p Medtronic dual chamber ICD, newly diagnosed HFrEF (EF 20%) who presents as a transfer from [facility] after admission for ADHF/PNA now transferred for ischemic eval in setting of newly depressed EF with noted WMA.     1. HFrEF (20%) - new finding at OSH. Started on bumex, and metop. Awaiting GDMT pending improvement in renal fx.  2. NSTEMI - trop peaked at OSH 25,000. Started on ASA/Plavix and statin.    Recommendations:  - Will discuss C today  - please get a r/p TTE  - Continue ASA, Plavix  - Continue metop succinate 25mg  - continue bumex 2mg qd  - will start ARB/ARNI and MRA pending kidney fx    *** Recommendations are preliminary until cosigned by the attending.    Colby Santos MD  Cardiology Fellow    For all New Consults and Questions:  [email or web address]   Login: [email or web address] Cardiology Progress Note  ------------------------------------------------------------------------------------------    SUBJECTIVE/EVENTS::  - Tele: no events  - NAEO    -------------------------------------------------------------------------------------------  ROS:  CV: chest pain (-), palpitation (-), orthopnea (-), PND (-), edema (-)  PULM: SOB (-), cough (-), wheezing (-), hemoptysis (-).   CONST: fever (-), chills (-) or fatigue (-)  GI: abdominal distension (-), abdominal pain (-) , nausea/vomiting (-), hematemesis, (-), melena (-), hematochezia (-)  : dysuria (-), frequency (-), hematuria (-).   NEURO: numbness (-), weakness (-), dizziness (-)  MSK: myalgia (-), joint pain (-)   SKIN: itching (-), rash (-)  HEENT:  visual changes (-); vertigo or throat pain (-);  neck stiffness (-)   Psych: change in mood (-), anxiety (-), depression (-)     All other review of systems is negative unless indicated above.   -------------------------------------------------------------------------------------------  VS:  T(F): 98.4 (10-16), Max: 99.3 (10-15)  HR: 117 (10-16) (108 - 124)  BP: 94/62 (10-16) (94/62 - 110/76)  RR: 18 (10-16)  SpO2: 94% (10-16)  I&O's Summary    14 Oct 2023 07:01  -  15 Oct 2023 07:00  --------------------------------------------------------  IN: 720 mL / OUT: 0 mL / NET: 720 mL    15 Oct 2023 07:01  -  16 Oct 2023 06:22  --------------------------------------------------------  IN: 720 mL / OUT: 200 mL / NET: 520 mL      ------------------------------------------------------------------------------------------  PHYSICAL EXAM:  GEN: NAD, sitting in bed  HEENT: NCAT, EOMI  CV: RRR, Ns1/s2, no m/r/g, JVP not elevated  RESP: CTA B/l, no w/r/r  ABD: soft, nt, nd  Ext: warm, 2+ pulses, no edema  Neuro: AAOx3, no focal deficits    -------------------------------------------------------------------------------------------  LABS:      10-16    139  |  106  |  33<H>  ----------------------------<  97  3.8   |  21<L>  |  1.57<H>    Ca    8.7      16 Oct 2023 05:52        CARDIAC MARKERS ( 14 Oct 2023 06:18 )  752 ng/L / x     / x     / 202 U/L / x     / 25.5 ng/mL            -------------------------------------------------------------------------------------------  Meds:  acetaminophen     Tablet .. 650 milliGRAM(s) Oral every 6 hours PRN  albuterol    90 MICROgram(s) HFA Inhaler 2 Puff(s) Inhalation every 6 hours PRN  aspirin enteric coated 81 milliGRAM(s) Oral daily  atorvastatin 80 milliGRAM(s) Oral at bedtime  buMETAnide 2 milliGRAM(s) Oral daily  clopidogrel Tablet 75 milliGRAM(s) Oral daily  dextrose 5%. 1000 milliLiter(s) IV Continuous <Continuous>  dextrose 5%. 1000 milliLiter(s) IV Continuous <Continuous>  dextrose 50% Injectable 12.5 Gram(s) IV Push once  dextrose 50% Injectable 25 Gram(s) IV Push once  dextrose 50% Injectable 25 Gram(s) IV Push once  dextrose Oral Gel 15 Gram(s) Oral once PRN  glucagon  Injectable 1 milliGRAM(s) IntraMuscular once  guaiFENesin Oral Liquid (Sugar-Free) 100 milliGRAM(s) Oral every 6 hours PRN  heparin   Injectable 5000 Unit(s) SubCutaneous every 8 hours  insulin glargine Injectable (LANTUS) 30 Unit(s) SubCutaneous at bedtime  insulin lispro (ADMELOG) corrective regimen sliding scale   SubCutaneous at bedtime  insulin lispro (ADMELOG) corrective regimen sliding scale   SubCutaneous three times a day before meals  insulin lispro Injectable (ADMELOG) 10 Unit(s) SubCutaneous three times a day before meals  metoprolol succinate ER 25 milliGRAM(s) Oral daily  pantoprazole    Tablet 40 milliGRAM(s) Oral before breakfast  tamsulosin 0.4 milliGRAM(s) Oral at bedtime    -------------------------------------------------------------------------------------------  Cardiovascular Diagnostic Testing:      -------------------------------------------------------------------------------------------  Assessment and Plan:   57 yo Male pmhx CVA with mild left sided deficits, HTN, DM2, vertigo, HLD, Mobitz II s/p Medtronic dual chamber ICD, newly diagnosed HFrEF (EF 20%) who presents as a transfer from [facility] after admission for ADHF/PNA now transferred for ischemic eval in setting of newly depressed EF with noted WMA.     1. HFrEF (20%) - new finding at OSH. Started on bumex, and metop. Awaiting GDMT pending improvement in renal fx.  2. NSTEMI - trop peaked at OSH 25,000. Started on ASA/Plavix and statin.    Recommendations:  - Will discuss LHC today  - please get a r/p TTE  - Continue ASA, Plavix  - Continue metop succinate 25mg  - continue bumex 2mg qd  - will start ARB/ARNI and MRA pending kidney fx      Colby Santos MD  Cardiology Fellow    For all New Consults and Questions:  [email or web address]   Login: [email or web address] Cardiology Progress Note  ------------------------------------------------------------------------------------------    SUBJECTIVE/EVENTS::  - Tele: no events  - NAEO    -------------------------------------------------------------------------------------------  ROS:  CV: chest pain (-), palpitation (-), orthopnea (-), PND (-), edema (-)  PULM: SOB (-), cough (-), wheezing (-), hemoptysis (-).   CONST: fever (-), chills (-) or fatigue (-)  GI: abdominal distension (-), abdominal pain (-) , nausea/vomiting (-), hematemesis, (-), melena (-), hematochezia (-)  : dysuria (-), frequency (-), hematuria (-).   NEURO: numbness (-), weakness (-), dizziness (-)  MSK: myalgia (-), joint pain (-)   SKIN: itching (-), rash (-)  HEENT:  visual changes (-); vertigo or throat pain (-);  neck stiffness (-)   Psych: change in mood (-), anxiety (-), depression (-)     All other review of systems is negative unless indicated above.   -------------------------------------------------------------------------------------------  VS:  T(F): 98.4 (10-16), Max: 99.3 (10-15)  HR: 117 (10-16) (108 - 124)  BP: 94/62 (10-16) (94/62 - 110/76)  RR: 18 (10-16)  SpO2: 94% (10-16)  I&O's Summary    14 Oct 2023 07:01  -  15 Oct 2023 07:00  --------------------------------------------------------  IN: 720 mL / OUT: 0 mL / NET: 720 mL    15 Oct 2023 07:01  -  16 Oct 2023 06:22  --------------------------------------------------------  IN: 720 mL / OUT: 200 mL / NET: 520 mL      ------------------------------------------------------------------------------------------  PHYSICAL EXAM:  GEN: NAD, sitting in bed  HEENT: NCAT, EOMI  CV: RRR, Ns1/s2, no m/r/g, JVP not elevated  RESP: CTA B/l, no w/r/r  ABD: soft, nt, nd  Ext: warm, 2+ pulses, no edema  Neuro: AAOx3, no focal deficits    -------------------------------------------------------------------------------------------  LABS:      10-16    139  |  106  |  33<H>  ----------------------------<  97  3.8   |  21<L>  |  1.57<H>    Ca    8.7      16 Oct 2023 05:52        CARDIAC MARKERS ( 14 Oct 2023 06:18 )  752 ng/L / x     / x     / 202 U/L / x     / 25.5 ng/mL            -------------------------------------------------------------------------------------------  Meds:  acetaminophen     Tablet .. 650 milliGRAM(s) Oral every 6 hours PRN  albuterol    90 MICROgram(s) HFA Inhaler 2 Puff(s) Inhalation every 6 hours PRN  aspirin enteric coated 81 milliGRAM(s) Oral daily  atorvastatin 80 milliGRAM(s) Oral at bedtime  buMETAnide 2 milliGRAM(s) Oral daily  clopidogrel Tablet 75 milliGRAM(s) Oral daily  dextrose 5%. 1000 milliLiter(s) IV Continuous <Continuous>  dextrose 5%. 1000 milliLiter(s) IV Continuous <Continuous>  dextrose 50% Injectable 12.5 Gram(s) IV Push once  dextrose 50% Injectable 25 Gram(s) IV Push once  dextrose 50% Injectable 25 Gram(s) IV Push once  dextrose Oral Gel 15 Gram(s) Oral once PRN  glucagon  Injectable 1 milliGRAM(s) IntraMuscular once  guaiFENesin Oral Liquid (Sugar-Free) 100 milliGRAM(s) Oral every 6 hours PRN  heparin   Injectable 5000 Unit(s) SubCutaneous every 8 hours  insulin glargine Injectable (LANTUS) 30 Unit(s) SubCutaneous at bedtime  insulin lispro (ADMELOG) corrective regimen sliding scale   SubCutaneous at bedtime  insulin lispro (ADMELOG) corrective regimen sliding scale   SubCutaneous three times a day before meals  insulin lispro Injectable (ADMELOG) 10 Unit(s) SubCutaneous three times a day before meals  metoprolol succinate ER 25 milliGRAM(s) Oral daily  pantoprazole    Tablet 40 milliGRAM(s) Oral before breakfast  tamsulosin 0.4 milliGRAM(s) Oral at bedtime    -------------------------------------------------------------------------------------------  Cardiovascular Diagnostic Testing:      -------------------------------------------------------------------------------------------  Assessment and Plan:   57 yo Male pmhx CVA with mild left sided deficits, HTN, DM2, vertigo, HLD, Mobitz II s/p Medtronic dual chamber ICD, newly diagnosed HFrEF (EF 20%) who presents as a transfer from [facility] after admission for ADHF/PNA now transferred for ischemic eval in setting of newly depressed EF with noted WMA.     1. HFrEF (20%) - new finding at OSH. Started on bumex, and metop. Awaiting GDMT pending improvement in renal fx.  2. NSTEMI - trop peaked at OSH 25,000. Started on ASA/Plavix and statin.  3. High degree block s/p dual chamber ICD - noted to be in A sensed V sensed tachycardic rhythm, in the 100-120s.     Recommendations:  - Will discuss LHC today  - please get a r/p TTE  - Continue ASA, Plavix  - Continue metop succinate 25mg  - continue bumex 2mg qd  - will start ARB/ARNI and MRA pending kidney fx  - please have ICD interrogated and consider reducing HR      Colby Santos MD  Cardiology Fellow    For all New Consults and Questions:  [email or web address]   Login: [email or web address]

## 2023-10-16 NOTE — PROGRESS NOTE ADULT - ATTENDING COMMENTS
58 year old man with prior stroke, mild residual deficits, transferred from [facility] for new severely reduced ejection fraction, prior history high grade heart block, ICD-P and planning coronary angiography today.    To contact call Cardiology Fellow or Attending as listed on [email or web address] password: [email or web address].

## 2023-10-17 DIAGNOSIS — I34.0 NONRHEUMATIC MITRAL (VALVE) INSUFFICIENCY: ICD-10-CM

## 2023-10-17 LAB
ALBUMIN SERPL ELPH-MCNC: 3 G/DL — LOW (ref 3.3–5)
ALBUMIN SERPL ELPH-MCNC: 3 G/DL — LOW (ref 3.3–5)
ALP SERPL-CCNC: 51 U/L — SIGNIFICANT CHANGE UP (ref 40–120)
ALP SERPL-CCNC: 51 U/L — SIGNIFICANT CHANGE UP (ref 40–120)
ALT FLD-CCNC: 25 U/L — SIGNIFICANT CHANGE UP (ref 10–45)
ALT FLD-CCNC: 25 U/L — SIGNIFICANT CHANGE UP (ref 10–45)
ANION GAP SERPL CALC-SCNC: 11 MMOL/L — SIGNIFICANT CHANGE UP (ref 5–17)
ANION GAP SERPL CALC-SCNC: 11 MMOL/L — SIGNIFICANT CHANGE UP (ref 5–17)
ANION GAP SERPL CALC-SCNC: 14 MMOL/L — SIGNIFICANT CHANGE UP (ref 5–17)
ANION GAP SERPL CALC-SCNC: 14 MMOL/L — SIGNIFICANT CHANGE UP (ref 5–17)
APPEARANCE UR: CLEAR — SIGNIFICANT CHANGE UP
APPEARANCE UR: CLEAR — SIGNIFICANT CHANGE UP
APTT BLD: 29.2 SEC — SIGNIFICANT CHANGE UP (ref 24.5–35.6)
APTT BLD: 29.2 SEC — SIGNIFICANT CHANGE UP (ref 24.5–35.6)
APTT BLD: 78.7 SEC — HIGH (ref 24.5–35.6)
APTT BLD: 78.7 SEC — HIGH (ref 24.5–35.6)
AST SERPL-CCNC: 15 U/L — SIGNIFICANT CHANGE UP (ref 10–40)
AST SERPL-CCNC: 15 U/L — SIGNIFICANT CHANGE UP (ref 10–40)
BACTERIA # UR AUTO: NEGATIVE — SIGNIFICANT CHANGE UP
BACTERIA # UR AUTO: NEGATIVE — SIGNIFICANT CHANGE UP
BILIRUB SERPL-MCNC: 0.3 MG/DL — SIGNIFICANT CHANGE UP (ref 0.2–1.2)
BILIRUB SERPL-MCNC: 0.3 MG/DL — SIGNIFICANT CHANGE UP (ref 0.2–1.2)
BILIRUB UR-MCNC: NEGATIVE — SIGNIFICANT CHANGE UP
BILIRUB UR-MCNC: NEGATIVE — SIGNIFICANT CHANGE UP
BLD GP AB SCN SERPL QL: NEGATIVE — SIGNIFICANT CHANGE UP
BLD GP AB SCN SERPL QL: NEGATIVE — SIGNIFICANT CHANGE UP
BUN SERPL-MCNC: 32 MG/DL — HIGH (ref 7–23)
BUN SERPL-MCNC: 32 MG/DL — HIGH (ref 7–23)
BUN SERPL-MCNC: 33 MG/DL — HIGH (ref 7–23)
BUN SERPL-MCNC: 33 MG/DL — HIGH (ref 7–23)
CALCIUM SERPL-MCNC: 8.8 MG/DL — SIGNIFICANT CHANGE UP (ref 8.4–10.5)
CALCIUM SERPL-MCNC: 8.8 MG/DL — SIGNIFICANT CHANGE UP (ref 8.4–10.5)
CALCIUM SERPL-MCNC: 9 MG/DL — SIGNIFICANT CHANGE UP (ref 8.4–10.5)
CALCIUM SERPL-MCNC: 9 MG/DL — SIGNIFICANT CHANGE UP (ref 8.4–10.5)
CHLORIDE SERPL-SCNC: 105 MMOL/L — SIGNIFICANT CHANGE UP (ref 96–108)
CHLORIDE SERPL-SCNC: 105 MMOL/L — SIGNIFICANT CHANGE UP (ref 96–108)
CHLORIDE SERPL-SCNC: 107 MMOL/L — SIGNIFICANT CHANGE UP (ref 96–108)
CHLORIDE SERPL-SCNC: 107 MMOL/L — SIGNIFICANT CHANGE UP (ref 96–108)
CHOLEST SERPL-MCNC: 167 MG/DL — SIGNIFICANT CHANGE UP
CHOLEST SERPL-MCNC: 167 MG/DL — SIGNIFICANT CHANGE UP
CO2 SERPL-SCNC: 19 MMOL/L — LOW (ref 22–31)
CO2 SERPL-SCNC: 19 MMOL/L — LOW (ref 22–31)
CO2 SERPL-SCNC: 20 MMOL/L — LOW (ref 22–31)
CO2 SERPL-SCNC: 20 MMOL/L — LOW (ref 22–31)
COLOR SPEC: SIGNIFICANT CHANGE UP
COLOR SPEC: SIGNIFICANT CHANGE UP
CREAT SERPL-MCNC: 1.49 MG/DL — HIGH (ref 0.5–1.3)
CREAT SERPL-MCNC: 1.49 MG/DL — HIGH (ref 0.5–1.3)
CREAT SERPL-MCNC: 1.54 MG/DL — HIGH (ref 0.5–1.3)
CREAT SERPL-MCNC: 1.54 MG/DL — HIGH (ref 0.5–1.3)
DIFF PNL FLD: ABNORMAL
DIFF PNL FLD: ABNORMAL
EGFR: 53 ML/MIN/1.73M2 — LOW
EGFR: 53 ML/MIN/1.73M2 — LOW
EGFR: 55 ML/MIN/1.73M2 — LOW
EGFR: 55 ML/MIN/1.73M2 — LOW
EPI CELLS # UR: 1 /HPF — SIGNIFICANT CHANGE UP
EPI CELLS # UR: 1 /HPF — SIGNIFICANT CHANGE UP
GLUCOSE BLDC GLUCOMTR-MCNC: 163 MG/DL — HIGH (ref 70–99)
GLUCOSE BLDC GLUCOMTR-MCNC: 163 MG/DL — HIGH (ref 70–99)
GLUCOSE BLDC GLUCOMTR-MCNC: 170 MG/DL — HIGH (ref 70–99)
GLUCOSE BLDC GLUCOMTR-MCNC: 170 MG/DL — HIGH (ref 70–99)
GLUCOSE BLDC GLUCOMTR-MCNC: 179 MG/DL — HIGH (ref 70–99)
GLUCOSE BLDC GLUCOMTR-MCNC: 179 MG/DL — HIGH (ref 70–99)
GLUCOSE BLDC GLUCOMTR-MCNC: 194 MG/DL — HIGH (ref 70–99)
GLUCOSE BLDC GLUCOMTR-MCNC: 194 MG/DL — HIGH (ref 70–99)
GLUCOSE SERPL-MCNC: 183 MG/DL — HIGH (ref 70–99)
GLUCOSE SERPL-MCNC: 183 MG/DL — HIGH (ref 70–99)
GLUCOSE SERPL-MCNC: 185 MG/DL — HIGH (ref 70–99)
GLUCOSE SERPL-MCNC: 185 MG/DL — HIGH (ref 70–99)
GLUCOSE UR QL: NEGATIVE — SIGNIFICANT CHANGE UP
GLUCOSE UR QL: NEGATIVE — SIGNIFICANT CHANGE UP
HCT VFR BLD CALC: 35.2 % — LOW (ref 39–50)
HCT VFR BLD CALC: 35.2 % — LOW (ref 39–50)
HCT VFR BLD CALC: 35.7 % — LOW (ref 39–50)
HCT VFR BLD CALC: 35.7 % — LOW (ref 39–50)
HCT VFR BLD CALC: 37.6 % — LOW (ref 39–50)
HCT VFR BLD CALC: 37.6 % — LOW (ref 39–50)
HDLC SERPL-MCNC: 52 MG/DL — SIGNIFICANT CHANGE UP
HDLC SERPL-MCNC: 52 MG/DL — SIGNIFICANT CHANGE UP
HGB BLD-MCNC: 10.9 G/DL — LOW (ref 13–17)
HGB BLD-MCNC: 10.9 G/DL — LOW (ref 13–17)
HGB BLD-MCNC: 11.2 G/DL — LOW (ref 13–17)
HGB BLD-MCNC: 11.2 G/DL — LOW (ref 13–17)
HGB BLD-MCNC: 11.8 G/DL — LOW (ref 13–17)
HGB BLD-MCNC: 11.8 G/DL — LOW (ref 13–17)
HYALINE CASTS # UR AUTO: 4 /LPF — HIGH (ref 0–2)
HYALINE CASTS # UR AUTO: 4 /LPF — HIGH (ref 0–2)
INR BLD: 1.17 RATIO — SIGNIFICANT CHANGE UP (ref 0.85–1.18)
INR BLD: 1.17 RATIO — SIGNIFICANT CHANGE UP (ref 0.85–1.18)
KETONES UR-MCNC: NEGATIVE — SIGNIFICANT CHANGE UP
KETONES UR-MCNC: NEGATIVE — SIGNIFICANT CHANGE UP
LEUKOCYTE ESTERASE UR-ACNC: NEGATIVE — SIGNIFICANT CHANGE UP
LEUKOCYTE ESTERASE UR-ACNC: NEGATIVE — SIGNIFICANT CHANGE UP
LIPID PNL WITH DIRECT LDL SERPL: 100 MG/DL — HIGH
LIPID PNL WITH DIRECT LDL SERPL: 100 MG/DL — HIGH
MCHC RBC-ENTMCNC: 24.9 PG — LOW (ref 27–34)
MCHC RBC-ENTMCNC: 24.9 PG — LOW (ref 27–34)
MCHC RBC-ENTMCNC: 25.2 PG — LOW (ref 27–34)
MCHC RBC-ENTMCNC: 25.2 PG — LOW (ref 27–34)
MCHC RBC-ENTMCNC: 25.5 PG — LOW (ref 27–34)
MCHC RBC-ENTMCNC: 25.5 PG — LOW (ref 27–34)
MCHC RBC-ENTMCNC: 30.5 GM/DL — LOW (ref 32–36)
MCHC RBC-ENTMCNC: 30.5 GM/DL — LOW (ref 32–36)
MCHC RBC-ENTMCNC: 31.4 GM/DL — LOW (ref 32–36)
MCHC RBC-ENTMCNC: 31.4 GM/DL — LOW (ref 32–36)
MCHC RBC-ENTMCNC: 31.8 GM/DL — LOW (ref 32–36)
MCHC RBC-ENTMCNC: 31.8 GM/DL — LOW (ref 32–36)
MCV RBC AUTO: 80.2 FL — SIGNIFICANT CHANGE UP (ref 80–100)
MCV RBC AUTO: 80.2 FL — SIGNIFICANT CHANGE UP (ref 80–100)
MCV RBC AUTO: 80.3 FL — SIGNIFICANT CHANGE UP (ref 80–100)
MCV RBC AUTO: 80.3 FL — SIGNIFICANT CHANGE UP (ref 80–100)
MCV RBC AUTO: 81.7 FL — SIGNIFICANT CHANGE UP (ref 80–100)
MCV RBC AUTO: 81.7 FL — SIGNIFICANT CHANGE UP (ref 80–100)
MRSA PCR RESULT.: SIGNIFICANT CHANGE UP
MRSA PCR RESULT.: SIGNIFICANT CHANGE UP
NITRITE UR-MCNC: NEGATIVE — SIGNIFICANT CHANGE UP
NITRITE UR-MCNC: NEGATIVE — SIGNIFICANT CHANGE UP
NON HDL CHOLESTEROL: 115 MG/DL — SIGNIFICANT CHANGE UP
NON HDL CHOLESTEROL: 115 MG/DL — SIGNIFICANT CHANGE UP
NRBC # BLD: 0 /100 WBCS — SIGNIFICANT CHANGE UP (ref 0–0)
PA ADP PRP-ACNC: 216 PRU — SIGNIFICANT CHANGE UP (ref 194–417)
PA ADP PRP-ACNC: 216 PRU — SIGNIFICANT CHANGE UP (ref 194–417)
PH UR: 5.5 — SIGNIFICANT CHANGE UP (ref 5–8)
PH UR: 5.5 — SIGNIFICANT CHANGE UP (ref 5–8)
PLATELET # BLD AUTO: 303 K/UL — SIGNIFICANT CHANGE UP (ref 150–400)
PLATELET # BLD AUTO: 303 K/UL — SIGNIFICANT CHANGE UP (ref 150–400)
PLATELET # BLD AUTO: 324 K/UL — SIGNIFICANT CHANGE UP (ref 150–400)
PLATELET # BLD AUTO: 324 K/UL — SIGNIFICANT CHANGE UP (ref 150–400)
PLATELET # BLD AUTO: 332 K/UL — SIGNIFICANT CHANGE UP (ref 150–400)
PLATELET # BLD AUTO: 332 K/UL — SIGNIFICANT CHANGE UP (ref 150–400)
POTASSIUM SERPL-MCNC: 3.8 MMOL/L — SIGNIFICANT CHANGE UP (ref 3.5–5.3)
POTASSIUM SERPL-MCNC: 3.8 MMOL/L — SIGNIFICANT CHANGE UP (ref 3.5–5.3)
POTASSIUM SERPL-MCNC: 3.9 MMOL/L — SIGNIFICANT CHANGE UP (ref 3.5–5.3)
POTASSIUM SERPL-MCNC: 3.9 MMOL/L — SIGNIFICANT CHANGE UP (ref 3.5–5.3)
POTASSIUM SERPL-SCNC: 3.8 MMOL/L — SIGNIFICANT CHANGE UP (ref 3.5–5.3)
POTASSIUM SERPL-SCNC: 3.8 MMOL/L — SIGNIFICANT CHANGE UP (ref 3.5–5.3)
POTASSIUM SERPL-SCNC: 3.9 MMOL/L — SIGNIFICANT CHANGE UP (ref 3.5–5.3)
POTASSIUM SERPL-SCNC: 3.9 MMOL/L — SIGNIFICANT CHANGE UP (ref 3.5–5.3)
PROT SERPL-MCNC: 5.9 G/DL — LOW (ref 6–8.3)
PROT SERPL-MCNC: 5.9 G/DL — LOW (ref 6–8.3)
PROT UR-MCNC: ABNORMAL
PROT UR-MCNC: ABNORMAL
PROTHROM AB SERPL-ACNC: 12.8 SEC — SIGNIFICANT CHANGE UP (ref 9.5–13)
PROTHROM AB SERPL-ACNC: 12.8 SEC — SIGNIFICANT CHANGE UP (ref 9.5–13)
RBC # BLD: 4.37 M/UL — SIGNIFICANT CHANGE UP (ref 4.2–5.8)
RBC # BLD: 4.37 M/UL — SIGNIFICANT CHANGE UP (ref 4.2–5.8)
RBC # BLD: 4.39 M/UL — SIGNIFICANT CHANGE UP (ref 4.2–5.8)
RBC # BLD: 4.39 M/UL — SIGNIFICANT CHANGE UP (ref 4.2–5.8)
RBC # BLD: 4.68 M/UL — SIGNIFICANT CHANGE UP (ref 4.2–5.8)
RBC # BLD: 4.68 M/UL — SIGNIFICANT CHANGE UP (ref 4.2–5.8)
RBC # FLD: 14.7 % — HIGH (ref 10.3–14.5)
RBC # FLD: 14.7 % — HIGH (ref 10.3–14.5)
RBC # FLD: 14.8 % — HIGH (ref 10.3–14.5)
RBC # FLD: 14.8 % — HIGH (ref 10.3–14.5)
RBC # FLD: 14.9 % — HIGH (ref 10.3–14.5)
RBC # FLD: 14.9 % — HIGH (ref 10.3–14.5)
RBC CASTS # UR COMP ASSIST: 2 /HPF — SIGNIFICANT CHANGE UP (ref 0–4)
RBC CASTS # UR COMP ASSIST: 2 /HPF — SIGNIFICANT CHANGE UP (ref 0–4)
RH IG SCN BLD-IMP: POSITIVE — SIGNIFICANT CHANGE UP
RH IG SCN BLD-IMP: POSITIVE — SIGNIFICANT CHANGE UP
S AUREUS DNA NOSE QL NAA+PROBE: SIGNIFICANT CHANGE UP
S AUREUS DNA NOSE QL NAA+PROBE: SIGNIFICANT CHANGE UP
SODIUM SERPL-SCNC: 138 MMOL/L — SIGNIFICANT CHANGE UP (ref 135–145)
SP GR SPEC: 1.03 — HIGH (ref 1.01–1.02)
SP GR SPEC: 1.03 — HIGH (ref 1.01–1.02)
TRIGL SERPL-MCNC: 80 MG/DL — SIGNIFICANT CHANGE UP
TRIGL SERPL-MCNC: 80 MG/DL — SIGNIFICANT CHANGE UP
TSH SERPL-MCNC: 0.33 UIU/ML — SIGNIFICANT CHANGE UP (ref 0.27–4.2)
TSH SERPL-MCNC: 0.33 UIU/ML — SIGNIFICANT CHANGE UP (ref 0.27–4.2)
UROBILINOGEN FLD QL: NEGATIVE — SIGNIFICANT CHANGE UP
UROBILINOGEN FLD QL: NEGATIVE — SIGNIFICANT CHANGE UP
WBC # BLD: 8.48 K/UL — SIGNIFICANT CHANGE UP (ref 3.8–10.5)
WBC # BLD: 8.48 K/UL — SIGNIFICANT CHANGE UP (ref 3.8–10.5)
WBC # BLD: 8.55 K/UL — SIGNIFICANT CHANGE UP (ref 3.8–10.5)
WBC # BLD: 8.55 K/UL — SIGNIFICANT CHANGE UP (ref 3.8–10.5)
WBC # BLD: 8.61 K/UL — SIGNIFICANT CHANGE UP (ref 3.8–10.5)
WBC # BLD: 8.61 K/UL — SIGNIFICANT CHANGE UP (ref 3.8–10.5)
WBC # FLD AUTO: 8.48 K/UL — SIGNIFICANT CHANGE UP (ref 3.8–10.5)
WBC # FLD AUTO: 8.48 K/UL — SIGNIFICANT CHANGE UP (ref 3.8–10.5)
WBC # FLD AUTO: 8.55 K/UL — SIGNIFICANT CHANGE UP (ref 3.8–10.5)
WBC # FLD AUTO: 8.55 K/UL — SIGNIFICANT CHANGE UP (ref 3.8–10.5)
WBC # FLD AUTO: 8.61 K/UL — SIGNIFICANT CHANGE UP (ref 3.8–10.5)
WBC # FLD AUTO: 8.61 K/UL — SIGNIFICANT CHANGE UP (ref 3.8–10.5)
WBC UR QL: 1 /HPF — SIGNIFICANT CHANGE UP (ref 0–5)
WBC UR QL: 1 /HPF — SIGNIFICANT CHANGE UP (ref 0–5)

## 2023-10-17 PROCEDURE — 93283 PRGRMG EVAL IMPLANTABLE DFB: CPT | Mod: 26

## 2023-10-17 PROCEDURE — 99223 1ST HOSP IP/OBS HIGH 75: CPT

## 2023-10-17 PROCEDURE — 99233 SBSQ HOSP IP/OBS HIGH 50: CPT | Mod: GC

## 2023-10-17 PROCEDURE — 93880 EXTRACRANIAL BILAT STUDY: CPT | Mod: 26

## 2023-10-17 PROCEDURE — 99233 SBSQ HOSP IP/OBS HIGH 50: CPT

## 2023-10-17 PROCEDURE — 71045 X-RAY EXAM CHEST 1 VIEW: CPT | Mod: 26

## 2023-10-17 RX ORDER — BUMETANIDE 0.25 MG/ML
1 INJECTION INTRAMUSCULAR; INTRAVENOUS DAILY
Refills: 0 | Status: DISCONTINUED | OUTPATIENT
Start: 2023-10-18 | End: 2023-10-20

## 2023-10-17 RX ORDER — HEPARIN SODIUM 5000 [USP'U]/ML
INJECTION INTRAVENOUS; SUBCUTANEOUS
Qty: 25000 | Refills: 0 | Status: DISCONTINUED | OUTPATIENT
Start: 2023-10-17 | End: 2023-10-19

## 2023-10-17 RX ORDER — HEPARIN SODIUM 5000 [USP'U]/ML
2500 INJECTION INTRAVENOUS; SUBCUTANEOUS EVERY 6 HOURS
Refills: 0 | Status: DISCONTINUED | OUTPATIENT
Start: 2023-10-17 | End: 2023-10-19

## 2023-10-17 RX ORDER — HEPARIN SODIUM 5000 [USP'U]/ML
5500 INJECTION INTRAVENOUS; SUBCUTANEOUS EVERY 6 HOURS
Refills: 0 | Status: DISCONTINUED | OUTPATIENT
Start: 2023-10-17 | End: 2023-10-19

## 2023-10-17 RX ORDER — HYDRALAZINE HCL 50 MG
10 TABLET ORAL EVERY 8 HOURS
Refills: 0 | Status: DISCONTINUED | OUTPATIENT
Start: 2023-10-17 | End: 2023-10-18

## 2023-10-17 RX ADMIN — HEPARIN SODIUM 5000 UNIT(S): 5000 INJECTION INTRAVENOUS; SUBCUTANEOUS at 06:34

## 2023-10-17 RX ADMIN — TAMSULOSIN HYDROCHLORIDE 0.4 MILLIGRAM(S): 0.4 CAPSULE ORAL at 22:03

## 2023-10-17 RX ADMIN — ATORVASTATIN CALCIUM 80 MILLIGRAM(S): 80 TABLET, FILM COATED ORAL at 22:03

## 2023-10-17 RX ADMIN — BUMETANIDE 2 MILLIGRAM(S): 0.25 INJECTION INTRAMUSCULAR; INTRAVENOUS at 06:33

## 2023-10-17 RX ADMIN — Medication 10 UNIT(S): at 17:39

## 2023-10-17 RX ADMIN — Medication 1: at 08:49

## 2023-10-17 RX ADMIN — HEPARIN SODIUM 1200 UNIT(S)/HR: 5000 INJECTION INTRAVENOUS; SUBCUTANEOUS at 19:40

## 2023-10-17 RX ADMIN — PANTOPRAZOLE SODIUM 40 MILLIGRAM(S): 20 TABLET, DELAYED RELEASE ORAL at 06:34

## 2023-10-17 RX ADMIN — Medication 25 MILLIGRAM(S): at 06:34

## 2023-10-17 RX ADMIN — HEPARIN SODIUM 1200 UNIT(S)/HR: 5000 INJECTION INTRAVENOUS; SUBCUTANEOUS at 11:37

## 2023-10-17 RX ADMIN — Medication 81 MILLIGRAM(S): at 11:38

## 2023-10-17 RX ADMIN — Medication 10 UNIT(S): at 11:52

## 2023-10-17 RX ADMIN — Medication 10 UNIT(S): at 08:50

## 2023-10-17 RX ADMIN — Medication 10 MILLIGRAM(S): at 22:03

## 2023-10-17 RX ADMIN — INSULIN GLARGINE 30 UNIT(S): 100 INJECTION, SOLUTION SUBCUTANEOUS at 22:04

## 2023-10-17 RX ADMIN — Medication 1: at 17:39

## 2023-10-17 RX ADMIN — Medication 1: at 11:52

## 2023-10-17 NOTE — CONSULT NOTE ADULT - SUBJECTIVE AND OBJECTIVE BOX
ADVANCED HEART FAILURE & TRANSPLANT- CONSULT NOTE  *To reach the NS1 Team from 8am to 5pm, please call [phone].  ___________________________________________________________________________    HPI  59 yo Male pmhx CVA with mild left sided deficits, HTN, DM2, vertigo, HLD, Mobitz II s/pp Medtronic dual chamber ICD (22) initially presented to [facility] from home with complaints of dyspnea and chest pain and was admitted w/ acute hypoxic respiratory failure likely due to acute pulmonary edema due to acute HFrEF in setting of acute NSTEMI, LAURA and enterovirus infection. Pt with brief MICU stay at [facility] requiring bipap (no intubation), was treated for PNA with cefepime, and was found to have TTE done 23 showing EF 20% with severely decreased LVSF, multiple regional wall motion abnormalities, and elevated LV end diastolic pressure. Pt was transferred to [facility] for cardiac cath evaluation. Patient without any acute complaints, complaining of intermittent palpitations for the last 2 days. No chest pain, SOB, fevers, nausea, vomiting, abdominal pain.         PAST MEDICAL & SURGICAL HISTORY:  CVA (cerebrovascular accident)      T2DM (type 2 diabetes mellitus)      HTN (hypertension)      HLD (hyperlipidemia)      S/P cystoscopy      S/P hernia repair        Social History:  Social History:    Marital Status:   Occupation:   Lives with: wife    Substance Use (street drugs): none  Tobacco Usage: denies  Alcohol Usage: denies (13 Oct 2023 21:05)        Allergies    No Known Allergies    Intolerances            Home Medications:  alfuzosin 10 mg oral tablet, extended release: 1 tab(s) orally once a day (at bedtime) (16 Oct 2023 12:22)  amLODIPine 10 mg oral tablet: 1 tab(s) orally once a day (16 Oct 2023 12:22)  aspirin 81 mg oral tablet: 1 tab(s) orally once a day (16 Oct 2023 12:22)  atorvastatin 80 mg oral tablet: 1 tab(s) orally once a day (16 Oct 2023 12:22)  ertugliflozin 15 mg oral tablet: 1 tab(s) orally once a day (16 Oct 2023 12:22)  finasteride 5 mg oral tablet: 1 tab(s) orally once a day (16 Oct 2023 12:22)  Fish oil: 1 capsule by mouth once a day (16 Oct 2023 12:22)  glipiZIDE 5 mg oral tablet, extended release: 1 tab(s) orally once a day (16 Oct 2023 12:22)  losartan 100 mg oral tablet: 1 tab(s) orally once a day (16 Oct 2023 12:22)  metFORMIN 1000 mg oral tablet: 1 tab(s) orally 2 times a day (16 Oct 2023 12:22)          Medications:  acetaminophen     Tablet .. 650 milliGRAM(s) Oral every 6 hours PRN  albuterol    90 MICROgram(s) HFA Inhaler 2 Puff(s) Inhalation every 6 hours PRN  aspirin enteric coated 81 milliGRAM(s) Oral daily  atorvastatin 80 milliGRAM(s) Oral at bedtime  buMETAnide 2 milliGRAM(s) Oral daily  dextrose 5%. 1000 milliLiter(s) IV Continuous <Continuous>  dextrose 5%. 1000 milliLiter(s) IV Continuous <Continuous>  dextrose 50% Injectable 12.5 Gram(s) IV Push once  dextrose 50% Injectable 25 Gram(s) IV Push once  dextrose 50% Injectable 25 Gram(s) IV Push once  dextrose Oral Gel 15 Gram(s) Oral once PRN  glucagon  Injectable 1 milliGRAM(s) IntraMuscular once  guaiFENesin Oral Liquid (Sugar-Free) 100 milliGRAM(s) Oral every 6 hours PRN  heparin   Injectable 2500 Unit(s) IV Push every 6 hours PRN  heparin   Injectable 5500 Unit(s) IV Push every 6 hours PRN  heparin  Infusion.  Unit(s)/Hr IV Continuous <Continuous>  insulin glargine Injectable (LANTUS) 30 Unit(s) SubCutaneous at bedtime  insulin lispro (ADMELOG) corrective regimen sliding scale   SubCutaneous at bedtime  insulin lispro (ADMELOG) corrective regimen sliding scale   SubCutaneous three times a day before meals  insulin lispro Injectable (ADMELOG) 10 Unit(s) SubCutaneous three times a day before meals  metoprolol succinate ER 25 milliGRAM(s) Oral daily  pantoprazole    Tablet 40 milliGRAM(s) Oral before breakfast  tamsulosin 0.4 milliGRAM(s) Oral at bedtime        Vitals:  Vital Signs Last 24 Hours  T(C): 36.8 (10-17-23 @ 11:22), Max: 37.3 (10-16-23 @ 17:40)  HR: 121 (10-17-23 @ 11:22) (108 - 121)  BP: 116/81 (10-17-23 @ 11:22) (91/64 - 116/81)  RR: 18 (10-17-23 @ 11:22) (14 - 18)  SpO2: 97% (10-17-23 @ 11:22) (93% - 97%)    Weight in k (10-17 @ 08:44)    I&O's Summary    16 Oct 2023 07:  -  17 Oct 2023 07:00  --------------------------------------------------------  IN: 720 mL / OUT: 0 mL / NET: 720 mL    17 Oct 2023 07:  -  17 Oct 2023 15:44  --------------------------------------------------------  IN: 360 mL / OUT: 0 mL / NET: 360 mL        Physical Exam  General: No distress. Comfortable.  Neck: Neck supple. JVP not elevated. No masses  Chest: Clear to auscultation bilaterally  CV: Normal S1 and S2  Abdomen: Soft, non-distended, non-tender  Extremities: warm and perfused, no edema   Neurology: Alert and oriented times three.     Labs:                        11.2   8.48  )-----------( 324      ( 17 Oct 2023 07:58 )             35.2     10-17    138  |  107  |  32<H>  ----------------------------<  183<H>  3.8   |  20<L>  |  1.54<H>    Ca    8.8      17 Oct 2023 07:58    TPro  5.9<L>  /  Alb  3.0<L>  /  TBili  0.3  /  DBili  x   /  AST  15  /  ALT  25  /  AlkPhos  51  10-17    PT/INR - ( 17 Oct 2023 07:59 )   PT: 12.8 sec;   INR: 1.17 ratio         PTT - ( 17 Oct 2023 07:59 )  PTT:29.2 sec              Total Cholesterol: 167  LDL: --  HDL: 52  T      Imaging Studies     < from: TTE W or WO Ultrasound Enhancing Agent (10.16. @ 17:20) >  CONCLUSIONS:      1. The left ventricular cavity is normal. Left ventricular wall thickness is normal. Left ventricular systolic function is severely decreased with a calculated ejection fraction of 20 % by the Mayers's biplane method of disks. There is global left ventricular hypokinesis. There is a small echogenicty noted at the LV apes, that could be consistent with left ventricular thrombus.   2. Normal right ventricular cavity size and systolic function.   3. Device lead is visualized.   4. Tricuspid aortic valve with normal leaflet excursion.   5. Structurally normal mitral valve with normal leaflet excursion. There is calcification of the mitral valve annulus. There is symmetric leaflet tethering. There is no mitral valve stenosis. There is severe mitral regurgitation.   6. Organized fibrinous vs coagulant material is seen in the pericardial space. There is a small pericardial effusion noted adjacent to the right ventricle and a small pericardial effusion noted adjacent to the right atrium with evidence of hemodynamic compromise.    ________________________________________________________________________________________  FINDINGS:     Left Ventricle:  The left ventricular cavity is normal. Left ventricular wall thickness is normal. Left ventricular systolic function is severely decreased with a calculated ejection fraction of 20 % by the Mayers's biplane method of disks. There isglobal left ventricular hypokinesis. There is a small echogenicty noted at the LV apes, that could be consistent with left ventricular thrombus.     Right Ventricle:  The right ventricular cavity is normal in size and normal systolic function. A device lead is visualized.     Left Atrium:  The left atrium is normal in size.     Right Atrium:  The right atrium is normal in size.     Aortic Valve:  The aortic valve is tricuspid with normal leaflet excursion. There is no aortic valve stenosis. Thereis no evidence of aortic regurgitation.     Mitral Valve:  Structurally normal mitral valve with normal leaflet excursion. There is calcification of the mitral valve annulus. There is symmetric leaflet tethering. There is no mitral valve stenosis. There is severe mitral regurgitation.     Tricuspid Valve:  Structurally normal tricuspid valve with normal leaflet excursion. There is trace tricuspid regurgitation.     Pulmonic Valve:  Structurally normal pulmonic valve with normal leaflet excursion.There is trace pulmonic regurgitation.     Aorta:  The aortic annulus and aortic root appear normal in size.     Pericardium:  Organized fibrinous vs coagulant material is seen in the pericardial space. There is a small pericardial effusion noted adjacent to the right ventricle and a small pericardial effusion noted adjacent to the right atrium with evidence of hemodynamic compromise.     Pleura:  Right pleural effusion noted.     Systemic Veins:  The inferior vena cava was not well visualized.  ____________________________________________________________________  Quantitative Data:  Left Ventricle Measurements: (Indexed to BSA)     IVSd (2D):   0.7 cm  LVPWd (2D):  0.9 cm  LVIDd (2D):  5.5 cm  LVIDs (2D):  5.0 cm  LV Mass:     168 g  BiPlaneLV EF%: 20 %     e' lateral: 8.00 cm/s  e' medial:  9.00 cm/s    Aorta Measurements: (normal range) (Indexed to BSA)     Ao Root 2.9 cm       LVOT / RVOT/ Qp/Qs Data: (Indexed to BSA)  LVOT Diameter: 2.20 cm       MR Vmax:          4.31 m/s  MR VTI:         108.00 cm  MR Mean Gradient: 56.0 mmHg  MR Peak Gradient: 74.3 mmHg  MR Aliasing Haroon:      < end of copied text >

## 2023-10-17 NOTE — CONSULT NOTE ADULT - NS ATTEND AMEND GEN_ALL_CORE FT
59 YO M with a history of CVA with residual mild R paresthesias, second degree Mobitz II heart block s/p DC-ICD 12/2022 (initial concern for sarcoid but negative biopsy/PET post procedure), DM2 (A1c 8.4%), and HTN who was admitted to [facility] with chest pain and dyspnea, found to have NSTEMI with markedly elevated troponins and new severe LV dysfunction with regional wall motion abnormalities as well as + enterovirus. He required NIPPV and was diuresed before being transferred to [facility] where LHC performed which revealed severe 3v CAD with critical proximal LAD involvement. CTS was consulted for CABG evaluation and HF consulted for comanagement.    He appears euvolemic on exam and is normotensive. However he is tachycardic and has poor non-invasive hemodynamics. He has a class 1 indication for CABG however will need to clarify invasive hemodynamics first to assess he if can tolerate conventional cardiac surgery without pre-op advanced therapies evaluation. If he does undergo surgery would likely benefit from margarito-operative MCS, likely Impella 5.5.     REVIEW OF STUDIES  EKG: sinus tachycardia, septal q-waves, left axis deviation   TTE 10/16/23: LV 5.5 cm, LVEF 20% with regional WMAs worse in the apex, LVOT VTI 8 cm, normal RV size/function, severe functional MR, small pericardial effusion, estimated RA pressure 8 mmHg   TTE 12/2022: normal LVEF   [facility]: 95% discrete proximal LAD disease and sequential multifocal disease with good distal target, 80-90% proximal LCx disease just prior to marginals, severe multifocal RCA disease with good targets     PLAN  # Acute systolic heart failure  -Etiology: ischemic  -GDMT: current regimen is metoprolol succinate 25 mg daily. start hydralizine 10 mg TID for afterload reduction. deferring RAASi/MRA/SGLT2i given likely surgery   -Diuretics: current regimen is bumex 2 mg PO daily, decrease to 1 mg PO daily   -Device: has ICD in place and notably with high burden of RV pacing, if undergoing CABG would benefit from epicardial lead at time of surgery for CRT pacing post-operatively   -Advanced therapies: severe LV dysfunction with tachycardia and poor non-invasive heomdynamics concerning, will plan for pre-op RHC tomorrow and if hemos poor will plan for VAD/transplant evaluation, either as backup post CABG or as primary plan. has good support and no clear psychosocial red flags. ABO AB. would need neurologic assessment with prior CVA and ideally improved conditioning from recent hospitalization.     # Severe functional MR  -Will need MVr/MVR at time of surgery if getting cABG     # Severe 3v CAD  -On ASA/statin, being considered for surgery but needs hemodynamics   -cMRI pending
Mr Aldo has ischemic cardiomyopathy and multivessel CAD.    He is quite deconditioned and I think he is at high risk of mortality and morbidity with CABG surgery. He currently unable to walk beyond the bathroom.    If he has viability, then revascularization is of course indicated, and given his young age CABG would be beneficial in theory, but in his current physical condition, I'm not sure that the early risk profile is in favor of surgery.    Will review after his viability and RHC studies.    He will also need to be evaluated for suitability for advanced HF therapies.

## 2023-10-17 NOTE — CHART NOTE - NSCHARTNOTEFT_GEN_A_CORE
HPI  59 yo Male pmhx CVA with mild left sided deficits, HTN, DM2, BPH, HLD, Mobitz II s/p Medtronic dual chamber ICD, newly diagnosed HFrEF (EF 20%) who presents as a transfer from [facility] after admission for ADHF/PNA/LAURA/ NSTEMI now transferred for ischemic eval in setting of newly depressed EF with noted WMA. Pt transferred from [facility] for ischemic workup.   Pt is s/p cath today which revealed multi-vessel cad via RRA.  Site dry and intact, band-aid in placed, no ecchymosis or hematoma noted.  +2 palpable pulse present.  Pt denies numbness or tingling.     Vital Signs Last 24 Hrs  T(C): 37 (17 Oct 2023 04:48), Max: 37.3 (16 Oct 2023 17:40)  T(F): 98.6 (17 Oct 2023 04:48), Max: 99.1 (16 Oct 2023 17:40)  HR: 116 (17 Oct 2023 04:48) (108 - 123)  BP: 105/71 (17 Oct 2023 04:48) (91/61 - 109/77)  BP(mean): --  RR: 18 (17 Oct 2023 04:48) (14 - 18)  SpO2: 93% (17 Oct 2023 04:48) (92% - 98%)    Parameters below as of 17 Oct 2023 04:48  Patient On (Oxygen Delivery Method): room air    Emelin Reyes Monegro, NP   Medicine Department   [facility] [id number]

## 2023-10-17 NOTE — PROGRESS NOTE ADULT - PROBLEM SELECTOR PLAN 1
LHC revealed 3v disease, undergoing CABG evaluation  Continue ASA, metoprolol, atorvastatin   Cardiac viability study, cardiac MRI pending  Resume heparin drip per cardiology  CTS following

## 2023-10-17 NOTE — CONSULT NOTE ADULT - ASSESSMENT
57 yo male hx of CVA with mild left sided deficits, HTN, DM2, vertigo, HLD, Mobitz II s/pp Medtronic dual chamber ICD (12/21/22) initially presented to [facility] due to worsening dyspnea and chest pain. Upon arrival he was found to have NSTMI and LAURA. ECHO revealed new reduce EF of 20% with severe MR. He was transferred over to [facility] for further management. He underwent LHC which revealed severe three vessel coronary artery disease for which he is now being evaluated for CABG with Dr. Alicea. Heart failure was consulted for further management.     Cardiac Studies  10/16 TTE: The left ventricular cavity is normal. Left ventricular wall thickness is normal. Left ventricular systolic function is severely decreased with a calculated ejection fraction of 20 % by the Mayers's biplane method of disks. There is global left ventricular hypokinesis. There is a small echogenicty noted at the LV apes, that could be consistent with left ventricular thrombus. Normal right ventricular cavity size and systolic function. Device lead is visualized. Tricuspid aortic valve with normal leaflet excursion. Structurally normal mitral valve with normal leaflet excursion. There is calcification of the mitral valve annulus. There is symmetric leaflet tethering. There is no mitral valve stenosis. There is severe mitral regurgitation. Organized fibrinous vs coagulant material is seen in the pericardial space. There is a small pericardial effusion noted adjacent to the right ventricle and a small pericardial effusion noted adjacent to the right atrium with evidence of hemodynamic compromise.    10/16 LHC: Severe three vessel coronary arterydisease in a right dominant system LM Left main artery: Angiography shows minor irregularities. Left anterior descending artery: There is severe proximal LAD disease There is also a moderate stenosis of the distal LAD.There is a 95 % stenosis. Circumflex: There are severe sequentialstenoses of the mid and distal LCx. . There is an 80 % stenosis. Right coronary artery: There are severe stenoses of the mid and distal RCA. . There is a 90 % stenosis.

## 2023-10-17 NOTE — PROGRESS NOTE ADULT - SUBJECTIVE AND OBJECTIVE BOX
William Garcia MD    Patient is a 58y old  Male who presents with a chief complaint of NSTEMI (17 Oct 2023 06:12)        SUBJECTIVE / OVERNIGHT EVENTS: Patient seen and examined. LHC revealed triple vessel disease, undergoing CABG w/u. Patient without complaints  TELEMETRY:  120's      MEDICATIONS  (STANDING):  aspirin enteric coated 81 milliGRAM(s) Oral daily  atorvastatin 80 milliGRAM(s) Oral at bedtime  buMETAnide 2 milliGRAM(s) Oral daily  dextrose 5%. 1000 milliLiter(s) (100 mL/Hr) IV Continuous <Continuous>  dextrose 5%. 1000 milliLiter(s) (50 mL/Hr) IV Continuous <Continuous>  dextrose 50% Injectable 12.5 Gram(s) IV Push once  dextrose 50% Injectable 25 Gram(s) IV Push once  dextrose 50% Injectable 25 Gram(s) IV Push once  glucagon  Injectable 1 milliGRAM(s) IntraMuscular once  heparin  Infusion.  Unit(s)/Hr (12 mL/Hr) IV Continuous <Continuous>  insulin glargine Injectable (LANTUS) 30 Unit(s) SubCutaneous at bedtime  insulin lispro (ADMELOG) corrective regimen sliding scale   SubCutaneous three times a day before meals  insulin lispro (ADMELOG) corrective regimen sliding scale   SubCutaneous at bedtime  insulin lispro Injectable (ADMELOG) 10 Unit(s) SubCutaneous three times a day before meals  metoprolol succinate ER 25 milliGRAM(s) Oral daily  pantoprazole    Tablet 40 milliGRAM(s) Oral before breakfast  tamsulosin 0.4 milliGRAM(s) Oral at bedtime    MEDICATIONS  (PRN):  acetaminophen     Tablet .. 650 milliGRAM(s) Oral every 6 hours PRN Temp greater or equal to 38C (100.4F), Mild Pain (1 - 3)  albuterol    90 MICROgram(s) HFA Inhaler 2 Puff(s) Inhalation every 6 hours PRN Shortness of Breath and/or Wheezing  dextrose Oral Gel 15 Gram(s) Oral once PRN Blood Glucose LESS THAN 70 milliGRAM(s)/deciliter  guaiFENesin Oral Liquid (Sugar-Free) 100 milliGRAM(s) Oral every 6 hours PRN Cough  heparin   Injectable 2500 Unit(s) IV Push every 6 hours PRN For aPTT between 40 - 57  heparin   Injectable 5500 Unit(s) IV Push every 6 hours PRN For aPTT less than 40      Vital Signs Last 24 Hrs  T(C): 36.8 (17 Oct 2023 11:22), Max: 37.3 (16 Oct 2023 17:40)  T(F): 98.2 (17 Oct 2023 11:22), Max: 99.1 (16 Oct 2023 17:40)  HR: 121 (17 Oct 2023 11:22) (108 - 121)  BP: 116/81 (17 Oct 2023 11:22) (91/61 - 116/81)  BP(mean): --  RR: 18 (17 Oct 2023 11:22) (14 - 18)  SpO2: 97% (17 Oct 2023 11:22) (92% - 98%)    Parameters below as of 17 Oct 2023 11:22  Patient On (Oxygen Delivery Method): room air      CAPILLARY BLOOD GLUCOSE      POCT Blood Glucose.: 179 mg/dL (17 Oct 2023 11:41)  POCT Blood Glucose.: 163 mg/dL (17 Oct 2023 08:04)  POCT Blood Glucose.: 210 mg/dL (16 Oct 2023 22:06)  POCT Blood Glucose.: 187 mg/dL (16 Oct 2023 16:55)    I&O's Summary    16 Oct 2023 07:01  -  17 Oct 2023 07:00  --------------------------------------------------------  IN: 720 mL / OUT: 0 mL / NET: 720 mL          PHYSICAL EXAM  GENERAL: NAD, well-developed  HEAD:  Atraumatic, normocephalic  EYES: EOMI, PERRLA, conjunctiva and sclera clear  CHEST/LUNG: Clear to auscultation bilaterally; no wheezes  HEART: +S1+S2, regular rate and rhythm  ABDOMEN: Soft, nontender, nondistended; bowel sounds present  EXTREMITIES:  2+ peripheral pulses; no clubbing, cyanosis, or edema  PSYCH: AAOx2-3      LABS:                        11.2   8.48  )-----------( 324      ( 17 Oct 2023 07:58 )             35.2     10-17    138  |  107  |  32<H>  ----------------------------<  183<H>  3.8   |  20<L>  |  1.54<H>    Ca    8.8      17 Oct 2023 07:58    TPro  5.9<L>  /  Alb  3.0<L>  /  TBili  0.3  /  DBili  x   /  AST  15  /  ALT  25  /  AlkPhos  51  10-17    PT/INR - ( 17 Oct 2023 07:59 )   PT: 12.8 sec;   INR: 1.17 ratio         PTT - ( 17 Oct 2023 07:59 )  PTT:29.2 sec      Urinalysis Basic - ( 17 Oct 2023 07:58 )    Color: Light Yellow / Appearance: Clear / S.028 / pH: x  Gluc: 183 mg/dL / Ketone: Negative  / Bili: Negative / Urobili: Negative   Blood: x / Protein: 30 mg/dL / Nitrite: Negative   Leuk Esterase: Negative / RBC: 2 /hpf / WBC 1 /HPF   Sq Epi: x / Non Sq Epi: x / Bacteria: Negative          RADIOLOGY & ADDITIONAL TESTS:    Imaging Personally Reviewed:  Consultant(s) Notes Reviewed:    Care Discussed with Consultants/Other Providers:

## 2023-10-17 NOTE — PROGRESS NOTE ADULT - ATTENDING COMMENTS
58 year old man with prior stroke, mild residual deficits, transferred from [facility] for new severely reduced ejection fraction, prior history high grade heart block, ICD-P. Coronary angiography with severe 3 vessel disease, CT Surgery indicated if have adequate myocardial viability. To have cardiac MRI.    To contact call Cardiology Fellow or Attending as listed on [email or web address] [email or web address].

## 2023-10-17 NOTE — PROGRESS NOTE ADULT - ASSESSMENT
58M hx CVA, HTN, DM2, vertigo, HLD, Mobitz II s/p ICD p/w NSTEMI to [facility], transferred to [facility] revealed 3v disease, undergoing CABG eval

## 2023-10-17 NOTE — CHART NOTE - NSCHARTNOTEFT_GEN_A_CORE
Pt's AICD info:    Medtronic Duel Chamber AICD  Model # [id number]  Serial # [id number]    Per [employer] Rep [phone], device is MRI conditional, but must be placed in MRI safe mode

## 2023-10-17 NOTE — PROGRESS NOTE ADULT - PROBLEM SELECTOR PLAN 3
Stable- ceatinine range 1.27-1.57 at [facility]- [facility]

## 2023-10-17 NOTE — PROGRESS NOTE ADULT - SUBJECTIVE AND OBJECTIVE BOX
Cardiology Progress Note  ------------------------------------------------------------------------------------------    SUBJECTIVE/EVENTS::  - Tele: no events  - Y/d s/p [facility] w/ 3v disease, CTS consulted    -------------------------------------------------------------------------------------------  ROS:  CV: chest pain (-), palpitation (-), orthopnea (-), PND (-), edema (-)  PULM: SOB (-), cough (-), wheezing (-), hemoptysis (-).   CONST: fever (-), chills (-) or fatigue (-)  GI: abdominal distension (-), abdominal pain (-) , nausea/vomiting (-), hematemesis, (-), melena (-), hematochezia (-)  : dysuria (-), frequency (-), hematuria (-).   NEURO: numbness (-), weakness (-), dizziness (-)  MSK: myalgia (-), joint pain (-)   SKIN: itching (-), rash (-)  HEENT:  visual changes (-); vertigo or throat pain (-);  neck stiffness (-)   Psych: change in mood (-), anxiety (-), depression (-)     All other review of systems is negative unless indicated above.   -------------------------------------------------------------------------------------------  VS:  T(F): 98.6 (10-17), Max: 99.1 (10-16)  HR: 116 (10-17) (108 - 118)  BP: 105/71 (10-17) (91/61 - 109/77)  RR: 18 (10-17)  SpO2: 93% (10-17)  I&O's Summary    15 Oct 2023 07:01  -  16 Oct 2023 07:00  --------------------------------------------------------  IN: 720 mL / OUT: 200 mL / NET: 520 mL    16 Oct 2023 07:01  -  17 Oct 2023 06:14  --------------------------------------------------------  IN: 720 mL / OUT: 0 mL / NET: 720 mL      ------------------------------------------------------------------------------------------  PHYSICAL EXAM:  GEN: NAD, sitting in bed  HEENT: NCAT, EOMI  CV: RRR, Ns1/s2, no m/r/g, JVP not elevated  RESP: CTA B/l, no w/r/r  ABD: soft, nt, nd  Ext: warm, 2+ pulses, no edema, RFA site clean  Neuro: AAOx3, no focal deficits  Psych: Calm, cooperative    -------------------------------------------------------------------------------------------  LABS:      10-16    139  |  106  |  33<H>  ----------------------------<  97  3.8   |  21<L>  |  1.57<H>    Ca    8.7      16 Oct 2023 05:52        CARDIAC MARKERS ( 14 Oct 2023 06:18 )  752 ng/L / x     / x     / 202 U/L / x     / 25.5 ng/mL            -------------------------------------------------------------------------------------------  Meds:  acetaminophen     Tablet .. 650 milliGRAM(s) Oral every 6 hours PRN  albuterol    90 MICROgram(s) HFA Inhaler 2 Puff(s) Inhalation every 6 hours PRN  aspirin enteric coated 81 milliGRAM(s) Oral daily  atorvastatin 80 milliGRAM(s) Oral at bedtime  buMETAnide 2 milliGRAM(s) Oral daily  dextrose 5%. 1000 milliLiter(s) IV Continuous <Continuous>  dextrose 5%. 1000 milliLiter(s) IV Continuous <Continuous>  dextrose 50% Injectable 25 Gram(s) IV Push once  dextrose 50% Injectable 12.5 Gram(s) IV Push once  dextrose 50% Injectable 25 Gram(s) IV Push once  dextrose Oral Gel 15 Gram(s) Oral once PRN  glucagon  Injectable 1 milliGRAM(s) IntraMuscular once  guaiFENesin Oral Liquid (Sugar-Free) 100 milliGRAM(s) Oral every 6 hours PRN  heparin   Injectable 5000 Unit(s) SubCutaneous every 8 hours  insulin glargine Injectable (LANTUS) 30 Unit(s) SubCutaneous at bedtime  insulin lispro (ADMELOG) corrective regimen sliding scale   SubCutaneous three times a day before meals  insulin lispro (ADMELOG) corrective regimen sliding scale   SubCutaneous at bedtime  insulin lispro Injectable (ADMELOG) 10 Unit(s) SubCutaneous three times a day before meals  metoprolol succinate ER 25 milliGRAM(s) Oral daily  pantoprazole    Tablet 40 milliGRAM(s) Oral before breakfast  tamsulosin 0.4 milliGRAM(s) Oral at bedtime    -------------------------------------------------------------------------------------------  Cardiovascular Diagnostic Testing:  TTE 10/16  CONCLUSIONS:      1. The left ventricular cavity is normal. Left ventricular wall thickness is normal. Left ventricular systolic function is severely decreased with a calculated ejection fraction of 20 % by the Mayers's biplane method of disks. There is global left ventricular hypokinesis. There is a small echogenicty noted at the LV apes, that could be consistent with left ventricular thrombus.   2. Normal right ventricular cavity size and systolic function.   3. Device lead is visualized.   4. Tricuspid aortic valve with normal leaflet excursion.   5. Structurally normal mitral valve with normal leaflet excursion. There is calcification of the mitral valve annulus. There is symmetric leaflet tethering. There is no mitral valve stenosis. There is severe mitral regurgitation.   6. Organized fibrinous vs coagulant material is seen in the pericardial space. There is a small pericardial effusion noted adjacent to the right ventricle and a small pericardial effusion noted adjacent to the right atrium with evidence of hemodynamic compromise.    -------------------------------------------------------------------------------------------  Assessment and Plan:   57 yo Male pmhx CVA with mild left sided deficits, HTN, DM2, vertigo, HLD, Mobitz II s/p Medtronic dual chamber ICD, newly diagnosed HFrEF (EF 20%) who presents as a transfer from [facility] after admission for ADHF/PNA now transferred for ischemic eval in setting of newly depressed EF with noted WMA. [facility] w/ severe 3v disease.    1. HFrEF (20%) - new finding at OSH. Started on bumex, and metop. Awaiting GDMT pending improvement in renal fx.  2. NSTEMI - trop peaked at OSH 25,000. Started on ASA/Plavix and statin. [facility] w/ severe 3v disease, now being evaluated for CABG.  3. High degree block s/p dual chamber ICD - noted to be in A sensed V sensed tachycardic rhythm, in the 100-120s.     Recommendations:  - Continue ASA 81, atorva 80mg  - Continue metop succinate 25mg  - will discuss diuresis maintenance plan  - will start ARNI and MRA pending kidney fx  - please have ICD interrogated and consider reducing HR      Colby Santos MD  Cardiology Fellow    For all New Consults and Questions:  [email or web address]   Login: [email or web address]   Cardiology Progress Note  ------------------------------------------------------------------------------------------    SUBJECTIVE/EVENTS::  - Tele: no events, a-sensed V-paced tachycardia  - Y/d s/p [facility] w/ 3v disease, CTS consulted    -------------------------------------------------------------------------------------------  ROS:  CV: chest pain (-), palpitation (-), orthopnea (-), PND (-), edema (-)  PULM: SOB (-), cough (-), wheezing (-), hemoptysis (-).   CONST: fever (-), chills (-) or fatigue (-)  GI: abdominal distension (-), abdominal pain (-) , nausea/vomiting (-), hematemesis, (-), melena (-), hematochezia (-)  : dysuria (-), frequency (-), hematuria (-).   NEURO: numbness (-), weakness (-), dizziness (-)  MSK: myalgia (-), joint pain (-)   SKIN: itching (-), rash (-)  HEENT:  visual changes (-); vertigo or throat pain (-);  neck stiffness (-)   Psych: change in mood (-), anxiety (-), depression (-)     All other review of systems is negative unless indicated above.   -------------------------------------------------------------------------------------------  VS:  T(F): 98.6 (10-17), Max: 99.1 (10-16)  HR: 116 (10-17) (108 - 118)  BP: 105/71 (10-17) (91/61 - 109/77)  RR: 18 (10-17)  SpO2: 93% (10-17)  I&O's Summary    15 Oct 2023 07:01  -  16 Oct 2023 07:00  --------------------------------------------------------  IN: 720 mL / OUT: 200 mL / NET: 520 mL    16 Oct 2023 07:01  -  17 Oct 2023 06:14  --------------------------------------------------------  IN: 720 mL / OUT: 0 mL / NET: 720 mL      ------------------------------------------------------------------------------------------  PHYSICAL EXAM:  GEN: NAD, sitting in bed  HEENT: NCAT, EOMI  CV: RRR, Ns1/s2, no m/r/g, JVP not elevated  RESP: CTA B/l, no w/r/r  ABD: soft, nt, nd  Ext: warm, 2+ pulses, no edema, RRA site clean  Neuro: AAOx3, no focal deficits  Psych: Calm, cooperative    -------------------------------------------------------------------------------------------  LABS:      10-16    139  |  106  |  33<H>  ----------------------------<  97  3.8   |  21<L>  |  1.57<H>    Ca    8.7      16 Oct 2023 05:52        CARDIAC MARKERS ( 14 Oct 2023 06:18 )  752 ng/L / x     / x     / 202 U/L / x     / 25.5 ng/mL            -------------------------------------------------------------------------------------------  Meds:  acetaminophen     Tablet .. 650 milliGRAM(s) Oral every 6 hours PRN  albuterol    90 MICROgram(s) HFA Inhaler 2 Puff(s) Inhalation every 6 hours PRN  aspirin enteric coated 81 milliGRAM(s) Oral daily  atorvastatin 80 milliGRAM(s) Oral at bedtime  buMETAnide 2 milliGRAM(s) Oral daily  dextrose 5%. 1000 milliLiter(s) IV Continuous <Continuous>  dextrose 5%. 1000 milliLiter(s) IV Continuous <Continuous>  dextrose 50% Injectable 25 Gram(s) IV Push once  dextrose 50% Injectable 12.5 Gram(s) IV Push once  dextrose 50% Injectable 25 Gram(s) IV Push once  dextrose Oral Gel 15 Gram(s) Oral once PRN  glucagon  Injectable 1 milliGRAM(s) IntraMuscular once  guaiFENesin Oral Liquid (Sugar-Free) 100 milliGRAM(s) Oral every 6 hours PRN  heparin   Injectable 5000 Unit(s) SubCutaneous every 8 hours  insulin glargine Injectable (LANTUS) 30 Unit(s) SubCutaneous at bedtime  insulin lispro (ADMELOG) corrective regimen sliding scale   SubCutaneous three times a day before meals  insulin lispro (ADMELOG) corrective regimen sliding scale   SubCutaneous at bedtime  insulin lispro Injectable (ADMELOG) 10 Unit(s) SubCutaneous three times a day before meals  metoprolol succinate ER 25 milliGRAM(s) Oral daily  pantoprazole    Tablet 40 milliGRAM(s) Oral before breakfast  tamsulosin 0.4 milliGRAM(s) Oral at bedtime    -------------------------------------------------------------------------------------------  Cardiovascular Diagnostic Testing:  TTE 10/16  CONCLUSIONS:      1. The left ventricular cavity is normal. Left ventricular wall thickness is normal. Left ventricular systolic function is severely decreased with a calculated ejection fraction of 20 % by the Mayers's biplane method of disks. There is global left ventricular hypokinesis. There is a small echogenicty noted at the LV apes, that could be consistent with left ventricular thrombus.   2. Normal right ventricular cavity size and systolic function.   3. Device lead is visualized.   4. Tricuspid aortic valve with normal leaflet excursion.   5. Structurally normal mitral valve with normal leaflet excursion. There is calcification of the mitral valve annulus. There is symmetric leaflet tethering. There is no mitral valve stenosis. There is severe mitral regurgitation.   6. Organized fibrinous vs coagulant material is seen in the pericardial space. There is a small pericardial effusion noted adjacent to the right ventricle and a small pericardial effusion noted adjacent to the right atrium with evidence of hemodynamic compromise.    -------------------------------------------------------------------------------------------  Assessment and Plan:   59 yo Male pmhx CVA with mild left sided deficits, HTN, DM2, vertigo, HLD, Mobitz II s/p Medtronic dual chamber ICD, newly diagnosed HFrEF (EF 20%) who presents as a transfer from [facility] after admission for ADHF/PNA now transferred for ischemic eval in setting of newly depressed EF with noted WMA. [facility] w/ severe 3v disease.    1. HFrEF (20%) - new finding at OSH. Started on bumex, and metop. Awaiting GDMT pending improvement in renal fx.  2. NSTEMI - trop peaked at OSH 25,000. Started on ASA/Plavix and statin. [facility] w/ severe 3v disease, now being evaluated for CABG.  3. High degree block s/p dual chamber ICD - noted to be in A sensed V sensed tachycardic rhythm, in the 100-120s.     Recommendations:  - Continue ASA 81, atorva 80mg  - Continue metop succinate 25mg  - continue bumex 2mg qd po for now, will likely not need diuretic once additional GDMT is started  - will start ARNI and MRA pending kidney fx  - appreciate CTS evaluation/recs      Colby Santos MD  Cardiology Fellow    For all New Consults and Questions:  [email or web address]   Login: candida   Cardiology Progress Note  ------------------------------------------------------------------------------------------    SUBJECTIVE/EVENTS::  - Tele: no events, a-sensed V-paced tachycardia  - Y/d s/p [facility] w/ 3v disease, CTS consulted    -------------------------------------------------------------------------------------------  ROS:  CV: chest pain (-), palpitation (-), orthopnea (-), PND (-), edema (-)  PULM: SOB (-), cough (-), wheezing (-), hemoptysis (-).   CONST: fever (-), chills (-) or fatigue (-)  GI: abdominal distension (-), abdominal pain (-) , nausea/vomiting (-), hematemesis, (-), melena (-), hematochezia (-)  : dysuria (-), frequency (-), hematuria (-).   NEURO: numbness (-), weakness (-), dizziness (-)  MSK: myalgia (-), joint pain (-)   SKIN: itching (-), rash (-)  HEENT:  visual changes (-); vertigo or throat pain (-);  neck stiffness (-)   Psych: change in mood (-), anxiety (-), depression (-)     All other review of systems is negative unless indicated above.   -------------------------------------------------------------------------------------------  VS:  T(F): 98.6 (10-17), Max: 99.1 (10-16)  HR: 116 (10-17) (108 - 118)  BP: 105/71 (10-17) (91/61 - 109/77)  RR: 18 (10-17)  SpO2: 93% (10-17)  I&O's Summary    15 Oct 2023 07:01  -  16 Oct 2023 07:00  --------------------------------------------------------  IN: 720 mL / OUT: 200 mL / NET: 520 mL    16 Oct 2023 07:01  -  17 Oct 2023 06:14  --------------------------------------------------------  IN: 720 mL / OUT: 0 mL / NET: 720 mL      ------------------------------------------------------------------------------------------  PHYSICAL EXAM:  GEN: NAD, sitting in bed  HEENT: NCAT, EOMI  CV: RRR, Ns1/s2, no m/r/g, JVP not elevated  RESP: CTA B/l, no w/r/r  ABD: soft, nt, nd  Ext: warm, 2+ pulses, no edema, RRA site clean  Neuro: AAOx3, no focal deficits  Psych: Calm, cooperative    -------------------------------------------------------------------------------------------  LABS:      10-16    139  |  106  |  33<H>  ----------------------------<  97  3.8   |  21<L>  |  1.57<H>    Ca    8.7      16 Oct 2023 05:52        CARDIAC MARKERS ( 14 Oct 2023 06:18 )  752 ng/L / x     / x     / 202 U/L / x     / 25.5 ng/mL            -------------------------------------------------------------------------------------------  Meds:  acetaminophen     Tablet .. 650 milliGRAM(s) Oral every 6 hours PRN  albuterol    90 MICROgram(s) HFA Inhaler 2 Puff(s) Inhalation every 6 hours PRN  aspirin enteric coated 81 milliGRAM(s) Oral daily  atorvastatin 80 milliGRAM(s) Oral at bedtime  buMETAnide 2 milliGRAM(s) Oral daily  dextrose 5%. 1000 milliLiter(s) IV Continuous <Continuous>  dextrose 5%. 1000 milliLiter(s) IV Continuous <Continuous>  dextrose 50% Injectable 25 Gram(s) IV Push once  dextrose 50% Injectable 12.5 Gram(s) IV Push once  dextrose 50% Injectable 25 Gram(s) IV Push once  dextrose Oral Gel 15 Gram(s) Oral once PRN  glucagon  Injectable 1 milliGRAM(s) IntraMuscular once  guaiFENesin Oral Liquid (Sugar-Free) 100 milliGRAM(s) Oral every 6 hours PRN  heparin   Injectable 5000 Unit(s) SubCutaneous every 8 hours  insulin glargine Injectable (LANTUS) 30 Unit(s) SubCutaneous at bedtime  insulin lispro (ADMELOG) corrective regimen sliding scale   SubCutaneous three times a day before meals  insulin lispro (ADMELOG) corrective regimen sliding scale   SubCutaneous at bedtime  insulin lispro Injectable (ADMELOG) 10 Unit(s) SubCutaneous three times a day before meals  metoprolol succinate ER 25 milliGRAM(s) Oral daily  pantoprazole    Tablet 40 milliGRAM(s) Oral before breakfast  tamsulosin 0.4 milliGRAM(s) Oral at bedtime    -------------------------------------------------------------------------------------------  Cardiovascular Diagnostic Testing:  TTE 10/16  CONCLUSIONS:      1. The left ventricular cavity is normal. Left ventricular wall thickness is normal. Left ventricular systolic function is severely decreased with a calculated ejection fraction of 20 % by the Mayers's biplane method of disks. There is global left ventricular hypokinesis. There is a small echogenicty noted at the LV apes, that could be consistent with left ventricular thrombus.   2. Normal right ventricular cavity size and systolic function.   3. Device lead is visualized.   4. Tricuspid aortic valve with normal leaflet excursion.   5. Structurally normal mitral valve with normal leaflet excursion. There is calcification of the mitral valve annulus. There is symmetric leaflet tethering. There is no mitral valve stenosis. There is severe mitral regurgitation.   6. Organized fibrinous vs coagulant material is seen in the pericardial space. There is a small pericardial effusion noted adjacent to the right ventricle and a small pericardial effusion noted adjacent to the right atrium with evidence of hemodynamic compromise.    -------------------------------------------------------------------------------------------  Assessment and Plan:   57 yo Male pmhx CVA with mild left sided deficits, HTN, DM2, vertigo, HLD, Mobitz II s/p Medtronic dual chamber ICD, newly diagnosed HFrEF (EF 20%) who presents as a transfer from [facility] after admission for ADHF/PNA now transferred for ischemic eval in setting of newly depressed EF with noted WMA. [facility] w/ severe 3v disease.    1. HFrEF (20%) - new finding at OSH. Started on bumex, and metop. Awaiting GDMT pending improvement in renal fx.  2. NSTEMI - trop peaked at OSH 25,000. Started on ASA/Plavix and statin. [facility] w/ severe 3v disease, now being evaluated for CABG.  3. High degree block s/p dual chamber ICD - noted to be in A sensed V sensed tachycardic rhythm, in the 100-120s.   4. Possible LV thrombus seen on TTE    Recommendations:  - please start heparin gtt  - please get CMR for viability study  - Continue ASA 81, atorva 80mg  - Continue metop succinate 25mg  - continue bumex 2mg qd po for now, will likely not need diuretic once additional GDMT is started  - will start ARNI and MRA pending kidney fx  - appreciate CTS evaluation/recs      Colby Santos MD  Cardiology Fellow    For all New Consults and Questions:  [email or web address]   Login: candida   Cardiology Progress Note  ------------------------------------------------------------------------------------------    SUBJECTIVE/EVENTS::  - Tele: no events, a-sensed V-paced tachycardia  - Y/d s/p [facility] w/ 3v disease, CTS consulted    -------------------------------------------------------------------------------------------  ROS:  CV: chest pain (-), palpitation (-), orthopnea (-), PND (-), edema (-)  PULM: SOB (-), cough (-), wheezing (-), hemoptysis (-).   CONST: fever (-), chills (-) or fatigue (-)  GI: abdominal distension (-), abdominal pain (-) , nausea/vomiting (-), hematemesis, (-), melena (-), hematochezia (-)  : dysuria (-), frequency (-), hematuria (-).   NEURO: numbness (-), weakness (-), dizziness (-)  MSK: myalgia (-), joint pain (-)   SKIN: itching (-), rash (-)  HEENT:  visual changes (-); vertigo or throat pain (-);  neck stiffness (-)   Psych: change in mood (-), anxiety (-), depression (-)     All other review of systems is negative unless indicated above.   -------------------------------------------------------------------------------------------  VS:  T(F): 98.6 (10-17), Max: 99.1 (10-16)  HR: 116 (10-17) (108 - 118)  BP: 105/71 (10-17) (91/61 - 109/77)  RR: 18 (10-17)  SpO2: 93% (10-17)  I&O's Summary    15 Oct 2023 07:01  -  16 Oct 2023 07:00  --------------------------------------------------------  IN: 720 mL / OUT: 200 mL / NET: 520 mL    16 Oct 2023 07:01  -  17 Oct 2023 06:14  --------------------------------------------------------  IN: 720 mL / OUT: 0 mL / NET: 720 mL      ------------------------------------------------------------------------------------------  PHYSICAL EXAM:  GEN: NAD, sitting in bed  HEENT: NCAT, EOMI  CV: RRR, Ns1/s2, no m/r/g, JVP not elevated  RESP: CTA B/l, no w/r/r  ABD: soft, nt, nd  Ext: warm, 2+ pulses, no edema, RRA site clean  Neuro: AAOx3, no focal deficits  Psych: Calm, cooperative    -------------------------------------------------------------------------------------------  LABS:      10-16    139  |  106  |  33<H>  ----------------------------<  97  3.8   |  21<L>  |  1.57<H>    Ca    8.7      16 Oct 2023 05:52        CARDIAC MARKERS ( 14 Oct 2023 06:18 )  752 ng/L / x     / x     / 202 U/L / x     / 25.5 ng/mL            -------------------------------------------------------------------------------------------  Meds:  acetaminophen     Tablet .. 650 milliGRAM(s) Oral every 6 hours PRN  albuterol    90 MICROgram(s) HFA Inhaler 2 Puff(s) Inhalation every 6 hours PRN  aspirin enteric coated 81 milliGRAM(s) Oral daily  atorvastatin 80 milliGRAM(s) Oral at bedtime  buMETAnide 2 milliGRAM(s) Oral daily  dextrose 5%. 1000 milliLiter(s) IV Continuous <Continuous>  dextrose 5%. 1000 milliLiter(s) IV Continuous <Continuous>  dextrose 50% Injectable 25 Gram(s) IV Push once  dextrose 50% Injectable 12.5 Gram(s) IV Push once  dextrose 50% Injectable 25 Gram(s) IV Push once  dextrose Oral Gel 15 Gram(s) Oral once PRN  glucagon  Injectable 1 milliGRAM(s) IntraMuscular once  guaiFENesin Oral Liquid (Sugar-Free) 100 milliGRAM(s) Oral every 6 hours PRN  heparin   Injectable 5000 Unit(s) SubCutaneous every 8 hours  insulin glargine Injectable (LANTUS) 30 Unit(s) SubCutaneous at bedtime  insulin lispro (ADMELOG) corrective regimen sliding scale   SubCutaneous three times a day before meals  insulin lispro (ADMELOG) corrective regimen sliding scale   SubCutaneous at bedtime  insulin lispro Injectable (ADMELOG) 10 Unit(s) SubCutaneous three times a day before meals  metoprolol succinate ER 25 milliGRAM(s) Oral daily  pantoprazole    Tablet 40 milliGRAM(s) Oral before breakfast  tamsulosin 0.4 milliGRAM(s) Oral at bedtime    -------------------------------------------------------------------------------------------  Cardiovascular Diagnostic Testing:  TTE 10/16  CONCLUSIONS:      1. The left ventricular cavity is normal. Left ventricular wall thickness is normal. Left ventricular systolic function is severely decreased with a calculated ejection fraction of 20 % by the Mayers's biplane method of disks. There is global left ventricular hypokinesis. There is a small echogenicty noted at the LV apes, that could be consistent with left ventricular thrombus.   2. Normal right ventricular cavity size and systolic function.   3. Device lead is visualized.   4. Tricuspid aortic valve with normal leaflet excursion.   5. Structurally normal mitral valve with normal leaflet excursion. There is calcification of the mitral valve annulus. There is symmetric leaflet tethering. There is no mitral valve stenosis. There is severe mitral regurgitation.   6. Organized fibrinous vs coagulant material is seen in the pericardial space. There is a small pericardial effusion noted adjacent to the right ventricle and a small pericardial effusion noted adjacent to the right atrium with evidence of hemodynamic compromise.    -------------------------------------------------------------------------------------------  Assessment and Plan:   59 yo Male pmhx CVA with mild left sided deficits, HTN, DM2, vertigo, HLD, Mobitz II s/p Medtronic dual chamber ICD, newly diagnosed HFrEF (EF 20%) who presents as a transfer from [facility] after admission for ADHF/PNA now transferred for ischemic eval in setting of newly depressed EF with noted WMA. [facility] w/ severe 3v disease.    1. HFrEF (20%) - new finding at OSH. Started on bumex, and metop. Awaiting GDMT pending improvement in renal fx.  2. NSTEMI - trop peaked at OSH 25,000. Started on ASA/Plavix and statin. [facility] w/ severe 3v disease, now being evaluated for CABG.  3. High degree block s/p dual chamber ICD - noted to be in A sensed V sensed tachycardic rhythm, in the 100-120s.   4. Possible LV thrombus seen on TTE    Recommendations:  - please start heparin gtt  - please get CMR for viability study and to assess for LV thrombus  - Continue ASA 81, atorva 80mg  - Continue metop succinate 25mg  - continue bumex 2mg qd po for now, will likely not need diuretic once additional GDMT is started  - will start ARNI and MRA pending kidney fx  - appreciate CTS evaluation/recs      Colby Santos MD  Cardiology Fellow    For all New Consults and Questions:  [email or web address]   Login: [email or web address]

## 2023-10-18 DIAGNOSIS — E78.5 HYPERLIPIDEMIA, UNSPECIFIED: ICD-10-CM

## 2023-10-18 DIAGNOSIS — R65.20 SEVERE SEPSIS WITHOUT SEPTIC SHOCK: ICD-10-CM

## 2023-10-18 DIAGNOSIS — I13.0 HYPERTENSIVE HEART AND CHRONIC KIDNEY DISEASE WITH HEART FAILURE AND STAGE 1 THROUGH STAGE 4 CHRONIC KIDNEY DISEASE, OR UNSPECIFIED CHRONIC KIDNEY DISEASE: ICD-10-CM

## 2023-10-18 DIAGNOSIS — Z11.4 ENCOUNTER FOR SCREENING FOR HUMAN IMMUNODEFICIENCY VIRUS [HIV]: ICD-10-CM

## 2023-10-18 DIAGNOSIS — I44.1 ATRIOVENTRICULAR BLOCK, SECOND DEGREE: ICD-10-CM

## 2023-10-18 DIAGNOSIS — R33.8 OTHER RETENTION OF URINE: ICD-10-CM

## 2023-10-18 DIAGNOSIS — E87.20 ACIDOSIS, UNSPECIFIED: ICD-10-CM

## 2023-10-18 DIAGNOSIS — J15.1 PNEUMONIA DUE TO PSEUDOMONAS: ICD-10-CM

## 2023-10-18 DIAGNOSIS — B34.1 ENTEROVIRUS INFECTION, UNSPECIFIED: ICD-10-CM

## 2023-10-18 DIAGNOSIS — Z79.02 LONG TERM (CURRENT) USE OF ANTITHROMBOTICS/ANTIPLATELETS: ICD-10-CM

## 2023-10-18 DIAGNOSIS — N40.1 BENIGN PROSTATIC HYPERPLASIA WITH LOWER URINARY TRACT SYMPTOMS: ICD-10-CM

## 2023-10-18 DIAGNOSIS — E11.65 TYPE 2 DIABETES MELLITUS WITH HYPERGLYCEMIA: ICD-10-CM

## 2023-10-18 DIAGNOSIS — I25.5 ISCHEMIC CARDIOMYOPATHY: ICD-10-CM

## 2023-10-18 DIAGNOSIS — J98.11 ATELECTASIS: ICD-10-CM

## 2023-10-18 DIAGNOSIS — I69.354 HEMIPLEGIA AND HEMIPARESIS FOLLOWING CEREBRAL INFARCTION AFFECTING LEFT NON-DOMINANT SIDE: ICD-10-CM

## 2023-10-18 DIAGNOSIS — E11.22 TYPE 2 DIABETES MELLITUS WITH DIABETIC CHRONIC KIDNEY DISEASE: ICD-10-CM

## 2023-10-18 DIAGNOSIS — A41.9 SEPSIS, UNSPECIFIED ORGANISM: ICD-10-CM

## 2023-10-18 DIAGNOSIS — Z79.84 LONG TERM (CURRENT) USE OF ORAL HYPOGLYCEMIC DRUGS: ICD-10-CM

## 2023-10-18 DIAGNOSIS — N17.0 ACUTE KIDNEY FAILURE WITH TUBULAR NECROSIS: ICD-10-CM

## 2023-10-18 DIAGNOSIS — Z95.810 PRESENCE OF AUTOMATIC (IMPLANTABLE) CARDIAC DEFIBRILLATOR: ICD-10-CM

## 2023-10-18 DIAGNOSIS — I11.0 HYPERTENSIVE HEART DISEASE WITH HEART FAILURE: ICD-10-CM

## 2023-10-18 DIAGNOSIS — J96.01 ACUTE RESPIRATORY FAILURE WITH HYPOXIA: ICD-10-CM

## 2023-10-18 DIAGNOSIS — B34.8 OTHER VIRAL INFECTIONS OF UNSPECIFIED SITE: ICD-10-CM

## 2023-10-18 DIAGNOSIS — I21.4 NON-ST ELEVATION (NSTEMI) MYOCARDIAL INFARCTION: ICD-10-CM

## 2023-10-18 DIAGNOSIS — I50.43 ACUTE ON CHRONIC COMBINED SYSTOLIC (CONGESTIVE) AND DIASTOLIC (CONGESTIVE) HEART FAILURE: ICD-10-CM

## 2023-10-18 DIAGNOSIS — E04.9 NONTOXIC GOITER, UNSPECIFIED: ICD-10-CM

## 2023-10-18 DIAGNOSIS — Z79.82 LONG TERM (CURRENT) USE OF ASPIRIN: ICD-10-CM

## 2023-10-18 DIAGNOSIS — I95.9 HYPOTENSION, UNSPECIFIED: ICD-10-CM

## 2023-10-18 DIAGNOSIS — E44.0 MODERATE PROTEIN-CALORIE MALNUTRITION: ICD-10-CM

## 2023-10-18 DIAGNOSIS — N18.2 CHRONIC KIDNEY DISEASE, STAGE 2 (MILD): ICD-10-CM

## 2023-10-18 DIAGNOSIS — Z11.52 ENCOUNTER FOR SCREENING FOR COVID-19: ICD-10-CM

## 2023-10-18 LAB
APTT BLD: 31.2 SEC — SIGNIFICANT CHANGE UP (ref 24.5–35.6)
APTT BLD: 31.2 SEC — SIGNIFICANT CHANGE UP (ref 24.5–35.6)
APTT BLD: 97.9 SEC — HIGH (ref 24.5–35.6)
APTT BLD: 97.9 SEC — HIGH (ref 24.5–35.6)
GLUCOSE BLDC GLUCOMTR-MCNC: 109 MG/DL — HIGH (ref 70–99)
GLUCOSE BLDC GLUCOMTR-MCNC: 109 MG/DL — HIGH (ref 70–99)
GLUCOSE BLDC GLUCOMTR-MCNC: 123 MG/DL — HIGH (ref 70–99)
GLUCOSE BLDC GLUCOMTR-MCNC: 123 MG/DL — HIGH (ref 70–99)
GLUCOSE BLDC GLUCOMTR-MCNC: 143 MG/DL — HIGH (ref 70–99)
GLUCOSE BLDC GLUCOMTR-MCNC: 143 MG/DL — HIGH (ref 70–99)
GLUCOSE BLDC GLUCOMTR-MCNC: 242 MG/DL — HIGH (ref 70–99)
GLUCOSE BLDC GLUCOMTR-MCNC: 242 MG/DL — HIGH (ref 70–99)
GLUCOSE BLDC GLUCOMTR-MCNC: 92 MG/DL — SIGNIFICANT CHANGE UP (ref 70–99)
GLUCOSE BLDC GLUCOMTR-MCNC: 92 MG/DL — SIGNIFICANT CHANGE UP (ref 70–99)
INR BLD: 1.2 RATIO — HIGH (ref 0.85–1.18)
INR BLD: 1.2 RATIO — HIGH (ref 0.85–1.18)
PROTHROM AB SERPL-ACNC: 13.1 SEC — HIGH (ref 9.5–13)
PROTHROM AB SERPL-ACNC: 13.1 SEC — HIGH (ref 9.5–13)

## 2023-10-18 PROCEDURE — 93287 PERI-PX DEVICE EVAL & PRGR: CPT | Mod: 26,76

## 2023-10-18 PROCEDURE — 75561 CARDIAC MRI FOR MORPH W/DYE: CPT | Mod: 26

## 2023-10-18 PROCEDURE — 99233 SBSQ HOSP IP/OBS HIGH 50: CPT

## 2023-10-18 PROCEDURE — 99233 SBSQ HOSP IP/OBS HIGH 50: CPT | Mod: GC

## 2023-10-18 RX ORDER — HYDRALAZINE HCL 50 MG
25 TABLET ORAL EVERY 8 HOURS
Refills: 0 | Status: DISCONTINUED | OUTPATIENT
Start: 2023-10-18 | End: 2023-10-20

## 2023-10-18 RX ORDER — ONDANSETRON 8 MG/1
4 TABLET, FILM COATED ORAL ONCE
Refills: 0 | Status: COMPLETED | OUTPATIENT
Start: 2023-10-18 | End: 2023-10-18

## 2023-10-18 RX ADMIN — Medication 81 MILLIGRAM(S): at 11:45

## 2023-10-18 RX ADMIN — INSULIN GLARGINE 30 UNIT(S): 100 INJECTION, SOLUTION SUBCUTANEOUS at 21:03

## 2023-10-18 RX ADMIN — Medication 10 UNIT(S): at 08:42

## 2023-10-18 RX ADMIN — ONDANSETRON 4 MILLIGRAM(S): 8 TABLET, FILM COATED ORAL at 12:11

## 2023-10-18 RX ADMIN — BUMETANIDE 1 MILLIGRAM(S): 0.25 INJECTION INTRAMUSCULAR; INTRAVENOUS at 05:32

## 2023-10-18 RX ADMIN — Medication 10 MILLIGRAM(S): at 05:32

## 2023-10-18 RX ADMIN — ATORVASTATIN CALCIUM 80 MILLIGRAM(S): 80 TABLET, FILM COATED ORAL at 21:04

## 2023-10-18 RX ADMIN — Medication 10 UNIT(S): at 17:15

## 2023-10-18 RX ADMIN — Medication 25 MILLIGRAM(S): at 21:05

## 2023-10-18 RX ADMIN — TAMSULOSIN HYDROCHLORIDE 0.4 MILLIGRAM(S): 0.4 CAPSULE ORAL at 21:04

## 2023-10-18 RX ADMIN — Medication 10 UNIT(S): at 12:07

## 2023-10-18 RX ADMIN — HEPARIN SODIUM 1200 UNIT(S)/HR: 5000 INJECTION INTRAVENOUS; SUBCUTANEOUS at 21:09

## 2023-10-18 RX ADMIN — PANTOPRAZOLE SODIUM 40 MILLIGRAM(S): 20 TABLET, DELAYED RELEASE ORAL at 05:32

## 2023-10-18 RX ADMIN — HEPARIN SODIUM 1200 UNIT(S)/HR: 5000 INJECTION INTRAVENOUS; SUBCUTANEOUS at 03:07

## 2023-10-18 RX ADMIN — HEPARIN SODIUM 1200 UNIT(S)/HR: 5000 INJECTION INTRAVENOUS; SUBCUTANEOUS at 05:34

## 2023-10-18 NOTE — PROGRESS NOTE ADULT - ASSESSMENT
59 yo Male pmhx CVA with mild left sided deficits, HTN, DM2, vertigo, HLD, Mobitz II s/p Medtronic dual chamber ICD, newly diagnosed HFrEF (EF 20%) who presents as a transfer from [facility] after admission for ADHF/PNA now transferred for ischemic eval in setting of newly depressed EF with noted WMA. [facility] w/ severe 3v disease.    1. HFrEF (20%) - new finding at OSH. Started on bumex, and metop. Remainder of GDMT pending improvement in renal fx.  2. NSTEMI - trop peaked at OSH 25,000. Started on ASA/Plavix and statin. [facility] w/ severe 3v disease, now being evaluated for CABG. cMRI pending for viability.   3. High degree block s/p dual chamber ICD - noted to be in A sensed V sensed tachycardic rhythm, in the 100-120s.   4. Possible LV thrombus seen on TTE    Recommendations:  - Continue heparin gtt  - please get CMR for viability study and to assess for LV thrombus  - Continue ASA 81, atorva 80mg  - Continue metop succinate 25mg  - HF following, Bumex decreased to 1mg daily.   - HF recommending pre-op RHC 10/18 and if hemodynamics poor, plan for VAD/transplant evaluation, either as backup post CABG or as primary plan  - will start ARNI and MRA pending kidney fx  - appreciate CTS evaluation/recs 59 yo Male pmhx CVA with mild left sided deficits, HTN, DM2, vertigo, HLD, Mobitz II s/p Medtronic dual chamber ICD, newly diagnosed HFrEF (EF 20%) who presents as a transfer from [facility] after admission for ADHF/PNA now transferred for ischemic eval in setting of newly depressed EF with noted WMA. [facility] w/ severe 3v disease.    1. HFrEF (20%) - new finding at OSH. Started on bumex, and metop. Remainder of GDMT pending improvement in renal fx.  2. NSTEMI - trop peaked at OSH 25,000. Started on ASA/Plavix and statin. [facility] w/ severe 3v disease, now being evaluated for CABG. cMRI pending for viability.   3. High degree block s/p dual chamber ICD - noted to be in A sensed V sensed tachycardic rhythm, in the 100-120s.   4. Possible LV thrombus seen on TTE    Recommendations:  - Continue heparin gtt  - please get CMR for viability study and to assess for LV thrombus  - Continue ASA 81, atorva 80mg  - Continue metop succinate 25mg  - HF following, Bumex decreased to 1mg daily.   - HF recommending pre-op RHC 10/18 and if hemodynamics poor, plan for VAD/transplant evaluation, either as backup post CABG or as primary plan  - will start ARNI and MRA pending kidney fx  - appreciate CTS evaluation/recs    Katerine Ferguson MD  Cardiology Fellow     Recommendations are preliminary until cosigned by attending  57 yo Male pmhx CVA with mild left sided deficits, HTN, DM2, vertigo, HLD, Mobitz II s/p Medtronic dual chamber ICD, newly diagnosed HFrEF (EF 20%) who presents as a transfer from [facility] after admission for ADHF/PNA now transferred for ischemic eval in setting of newly depressed EF with noted WMA. [facility] w/ severe 3v disease.    1. HFrEF (20%) - new finding at OSH. Started on bumex, and metop. Remainder of GDMT pending improvement in renal fx.  2. NSTEMI - trop peaked at OSH 25,000. Started on ASA/Plavix and statin. [facility] w/ severe 3v disease, now being evaluated for CABG. cMRI pending for viability.   3. High degree block s/p dual chamber ICD - noted to be in A sensed V sensed tachycardic rhythm, in the 100-120s.   4. Possible LV thrombus seen on TTE    Recommendations:  - Continue heparin gtt  - please get CMR for viability study and to assess for LV thrombus  - Continue ASA 81, atorva 80mg  - Continue metop succinate 25mg  - HF following, Bumex decreased to 1mg daily.   - HF recommending pre-op RHC 10/18 and if hemodynamics poor, plan for VAD/transplant evaluation, either as backup post CABG or as primary plan  - will start ARNI and MRA pending cardiac MRI, renal function tests  - appreciate CTS evaluation/recs    Katerine Ferguson MD  Cardiology Fellow     Recommendations are preliminary until cosigned by attending

## 2023-10-18 NOTE — PROGRESS NOTE ADULT - SUBJECTIVE AND OBJECTIVE BOX
William Garcia MD    Patient is a 58y old  Male who presents with a chief complaint of NSTEMI (18 Oct 2023 07:53)        SUBJECTIVE / OVERNIGHT EVENTS: Patient seen and examined. No new events/complaints  TELEMETRY: /-140's      MEDICATIONS  (STANDING):  aspirin enteric coated 81 milliGRAM(s) Oral daily  atorvastatin 80 milliGRAM(s) Oral at bedtime  buMETAnide 1 milliGRAM(s) Oral daily  dextrose 5%. 1000 milliLiter(s) (100 mL/Hr) IV Continuous <Continuous>  dextrose 5%. 1000 milliLiter(s) (50 mL/Hr) IV Continuous <Continuous>  dextrose 50% Injectable 12.5 Gram(s) IV Push once  dextrose 50% Injectable 25 Gram(s) IV Push once  dextrose 50% Injectable 25 Gram(s) IV Push once  glucagon  Injectable 1 milliGRAM(s) IntraMuscular once  heparin  Infusion.  Unit(s)/Hr (12 mL/Hr) IV Continuous <Continuous>  hydrALAZINE 10 milliGRAM(s) Oral every 8 hours  insulin glargine Injectable (LANTUS) 30 Unit(s) SubCutaneous at bedtime  insulin lispro (ADMELOG) corrective regimen sliding scale   SubCutaneous three times a day before meals  insulin lispro (ADMELOG) corrective regimen sliding scale   SubCutaneous at bedtime  insulin lispro Injectable (ADMELOG) 10 Unit(s) SubCutaneous three times a day before meals  metoprolol succinate ER 25 milliGRAM(s) Oral daily  pantoprazole    Tablet 40 milliGRAM(s) Oral before breakfast  tamsulosin 0.4 milliGRAM(s) Oral at bedtime    MEDICATIONS  (PRN):  acetaminophen     Tablet .. 650 milliGRAM(s) Oral every 6 hours PRN Temp greater or equal to 38C (100.4F), Mild Pain (1 - 3)  albuterol    90 MICROgram(s) HFA Inhaler 2 Puff(s) Inhalation every 6 hours PRN Shortness of Breath and/or Wheezing  dextrose Oral Gel 15 Gram(s) Oral once PRN Blood Glucose LESS THAN 70 milliGRAM(s)/deciliter  guaiFENesin Oral Liquid (Sugar-Free) 100 milliGRAM(s) Oral every 6 hours PRN Cough  heparin   Injectable 2500 Unit(s) IV Push every 6 hours PRN For aPTT between 40 - 57  heparin   Injectable 5500 Unit(s) IV Push every 6 hours PRN For aPTT less than 40      Vital Signs Last 24 Hrs  T(C): 36.8 (18 Oct 2023 11:31), Max: 37.1 (17 Oct 2023 19:51)  T(F): 98.3 (18 Oct 2023 11:31), Max: 98.8 (17 Oct 2023 19:51)  HR: 116 (18 Oct 2023 11:) (115 - 123)  BP: 103/72 (18 Oct 2023 11:) (95/63 - 107/71)  BP(mean): --  RR: 18 (18 Oct 2023 11:31) (16 - 18)  SpO2: 96% (18 Oct 2023 11:31) (94% - 97%)    Parameters below as of 18 Oct 2023 11:31  Patient On (Oxygen Delivery Method): room air      CAPILLARY BLOOD GLUCOSE      POCT Blood Glucose.: 92 mg/dL (18 Oct 2023 11:17)  POCT Blood Glucose.: 123 mg/dL (18 Oct 2023 07:46)  POCT Blood Glucose.: 194 mg/dL (17 Oct 2023 21:16)  POCT Blood Glucose.: 170 mg/dL (17 Oct 2023 17:38)  POCT Blood Glucose.: 179 mg/dL (17 Oct 2023 11:41)    I&O's Summary    17 Oct 2023 07:  -  18 Oct 2023 07:00  --------------------------------------------------------  IN: 660 mL / OUT: 0 mL / NET: 660 mL    18 Oct 2023 07:01  -  18 Oct 2023 11:35  --------------------------------------------------------  IN: 240 mL / OUT: 0 mL / NET: 240 mL          PHYSICAL EXAM  GENERAL: NAD, well-developed  HEAD:  Atraumatic, normocephalic  EYES: EOMI, PERRLA, conjunctiva and sclera clear  CHEST/LUNG: Clear to auscultation bilaterally; no wheezes  HEART: +S1+S2, regular rate and rhythm  ABDOMEN: Soft, nontender, nondistended; bowel sounds present  EXTREMITIES:  2+ peripheral pulses; no clubbing, cyanosis, or edema  PSYCH: AAOx2-3      LABS:                        10.9   8.61  )-----------( 303      ( 17 Oct 2023 18:49 )             35.7     10-    138  |  107  |  32<H>  ----------------------------<  183<H>  3.8   |  20<L>  |  1.54<H>    Ca    8.8      17 Oct 2023 07:58    TPro  5.9<L>  /  Alb  3.0<L>  /  TBili  0.3  /  DBili  x   /  AST  15  /  ALT  25  /  AlkPhos  51  10-17    PT/INR - ( 18 Oct 2023 06:11 )   PT: 13.1 sec;   INR: 1.20 ratio         PTT - ( 18 Oct 2023 06:11 )  PTT:31.2 sec      Urinalysis Basic - ( 17 Oct 2023 07:58 )    Color: Light Yellow / Appearance: Clear / S.028 / pH: x  Gluc: 183 mg/dL / Ketone: Negative  / Bili: Negative / Urobili: Negative   Blood: x / Protein: 30 mg/dL / Nitrite: Negative   Leuk Esterase: Negative / RBC: 2 /hpf / WBC 1 /HPF   Sq Epi: x / Non Sq Epi: x / Bacteria: Negative          RADIOLOGY & ADDITIONAL TESTS:    Imaging Personally Reviewed:  Consultant(s) Notes Reviewed:    Care Discussed with Consultants/Other Providers:

## 2023-10-18 NOTE — PROGRESS NOTE ADULT - SUBJECTIVE AND OBJECTIVE BOX
Overnight Events:  Intermittently confused. Tachycardic.     Review Of Systems: No chest pain, shortness of breath, or palpitations            Current Meds:  acetaminophen     Tablet .. 650 milliGRAM(s) Oral every 6 hours PRN  albuterol    90 MICROgram(s) HFA Inhaler 2 Puff(s) Inhalation every 6 hours PRN  aspirin enteric coated 81 milliGRAM(s) Oral daily  atorvastatin 80 milliGRAM(s) Oral at bedtime  buMETAnide 1 milliGRAM(s) Oral daily  dextrose 5%. 1000 milliLiter(s) IV Continuous <Continuous>  dextrose 5%. 1000 milliLiter(s) IV Continuous <Continuous>  dextrose 50% Injectable 12.5 Gram(s) IV Push once  dextrose 50% Injectable 25 Gram(s) IV Push once  dextrose 50% Injectable 25 Gram(s) IV Push once  dextrose Oral Gel 15 Gram(s) Oral once PRN  glucagon  Injectable 1 milliGRAM(s) IntraMuscular once  guaiFENesin Oral Liquid (Sugar-Free) 100 milliGRAM(s) Oral every 6 hours PRN  heparin   Injectable 2500 Unit(s) IV Push every 6 hours PRN  heparin   Injectable 5500 Unit(s) IV Push every 6 hours PRN  heparin  Infusion.  Unit(s)/Hr IV Continuous <Continuous>  hydrALAZINE 10 milliGRAM(s) Oral every 8 hours  insulin glargine Injectable (LANTUS) 30 Unit(s) SubCutaneous at bedtime  insulin lispro (ADMELOG) corrective regimen sliding scale   SubCutaneous at bedtime  insulin lispro (ADMELOG) corrective regimen sliding scale   SubCutaneous three times a day before meals  insulin lispro Injectable (ADMELOG) 10 Unit(s) SubCutaneous three times a day before meals  metoprolol succinate ER 25 milliGRAM(s) Oral daily  pantoprazole    Tablet 40 milliGRAM(s) Oral before breakfast  tamsulosin 0.4 milliGRAM(s) Oral at bedtime      Vitals:  T(F): 98.1 (10-18), Max: 98.8 (10-17)  HR: 123 (10-18) (118 - 123)  BP: 100/69 (10-18) (100/69 - 116/81)  RR: 18 (10-18)  SpO2: 94% (10-)  I&O's Summary    17 Oct 2023 07:01  -  18 Oct 2023 07:00  --------------------------------------------------------  IN: 660 mL / OUT: 0 mL / NET: 660 mL        Physical Exam:    Appearance: No acute distress; fatigued appearing  Eyes: pink conjunctiva  HEENT: AT/NC   Cardiovascular: RRR, S1, S2, no murmurs, rubs, or gallops; no edema; no JVD  Respiratory: Clear to anterior auscultation bilaterally  Gastrointestinal: soft, non-tender, non-distended   Musculoskeletal: No clubbing; no joint deformity   Neurologic: Normal speech, no facial asymmetry  Psychiatry: AAOx3, mood & affect appropriate  Skin: No rashes, ecchymoses, or cyanosis of exposed skin                           10.9   8.61  )-----------( 303      ( 17 Oct 2023 18:49 )             35.7     10-17    138  |  107  |  32<H>  ----------------------------<  183<H>  3.8   |  20<L>  |  1.54<H>    Ca    8.8      17 Oct 2023 07:58    TPro  5.9<L>  /  Alb  3.0<L>  /  TBili  0.3  /  DBili  x   /  AST  15  /  ALT  25  /  AlkPhos  51  10-17    PT/INR - ( 18 Oct 2023 06:11 )   PT: 13.1 sec;   INR: 1.20 ratio         PTT - ( 18 Oct 2023 06:11 )  PTT:31.2 sec  CARDIAC MARKERS ( 14 Oct 2023 06:18 )  752 ng/L / x     / x     / 202 U/L / x     / 25.5 ng/mL        Total Cholesterol: 167  LDL: --  HDL: 52  T    Interpretation of Telemetry: A-V paced            Overnight Events:  Intermittently confused. Tachycardic.     Review Of Systems:          CV: chest pain (-), palpitation (-), orthopnea (-), PND (-), edema (-)  PULM: SOB (-), cough (-), wheezing (-), hemoptysis (-).   CONST: fever (-), chills (-) or fatigue (-)  GI: abdominal distension (-), abdominal pain (-) , nausea/vomiting (-), hematemesis, (-), melena (-), hematochezia (-)  : dysuria (-), frequency (-), hematuria (-).   NEURO: numbness (-), weakness (-), dizziness (-)  MSK: myalgia (-), joint pain (-)   SKIN: itching (-), rash (-)  HEENT:  visual changes (-); vertigo or throat pain (-);  neck stiffness (-)   Psych: change in mood (-), anxiety (-), depression (-)     All other review of systems is negative unless indicated above.     Current Meds:  acetaminophen     Tablet .. 650 milliGRAM(s) Oral every 6 hours PRN  albuterol    90 MICROgram(s) HFA Inhaler 2 Puff(s) Inhalation every 6 hours PRN  aspirin enteric coated 81 milliGRAM(s) Oral daily  atorvastatin 80 milliGRAM(s) Oral at bedtime  buMETAnide 1 milliGRAM(s) Oral daily  dextrose 5%. 1000 milliLiter(s) IV Continuous <Continuous>  dextrose 5%. 1000 milliLiter(s) IV Continuous <Continuous>  dextrose 50% Injectable 12.5 Gram(s) IV Push once  dextrose 50% Injectable 25 Gram(s) IV Push once  dextrose 50% Injectable 25 Gram(s) IV Push once  dextrose Oral Gel 15 Gram(s) Oral once PRN  glucagon  Injectable 1 milliGRAM(s) IntraMuscular once  guaiFENesin Oral Liquid (Sugar-Free) 100 milliGRAM(s) Oral every 6 hours PRN  heparin   Injectable 2500 Unit(s) IV Push every 6 hours PRN  heparin   Injectable 5500 Unit(s) IV Push every 6 hours PRN  heparin  Infusion.  Unit(s)/Hr IV Continuous <Continuous>  hydrALAZINE 10 milliGRAM(s) Oral every 8 hours  insulin glargine Injectable (LANTUS) 30 Unit(s) SubCutaneous at bedtime  insulin lispro (ADMELOG) corrective regimen sliding scale   SubCutaneous at bedtime  insulin lispro (ADMELOG) corrective regimen sliding scale   SubCutaneous three times a day before meals  insulin lispro Injectable (ADMELOG) 10 Unit(s) SubCutaneous three times a day before meals  metoprolol succinate ER 25 milliGRAM(s) Oral daily  pantoprazole    Tablet 40 milliGRAM(s) Oral before breakfast  tamsulosin 0.4 milliGRAM(s) Oral at bedtime      Vitals:  T(F): 98.1 (10-), Max: 98.8 (10-17)  HR: 123 (10-18) (118 - 123)  BP: 100/69 (10-) (100/69 - 116/81)  RR: 18 (10-)  SpO2: 94% (10-18)  I&O's Summary    17 Oct 2023 07:01  -  18 Oct 2023 07:00  --------------------------------------------------------  IN: 660 mL / OUT: 0 mL / NET: 660 mL        Physical Exam:    Appearance: No acute distress; fatigued appearing  Eyes: pink conjunctiva  HEENT: AT/NC   Cardiovascular: RRR, S1, S2, no murmurs, rubs, or gallops; no edema; no JVD  Respiratory: Clear to anterior auscultation bilaterally  Gastrointestinal: soft, non-tender, non-distended   Musculoskeletal: No clubbing; no joint deformity   Neurologic: Normal speech, no facial asymmetry  Psychiatry: AAOx3, mood & affect appropriate  Skin: No rashes, ecchymoses, or cyanosis of exposed skin                           10.9   8.61  )-----------( 303      ( 17 Oct 2023 18:49 )             35.7     10-17    138  |  107  |  32<H>  ----------------------------<  183<H>  3.8   |  20<L>  |  1.54<H>    Ca    8.8      17 Oct 2023 07:58    TPro  5.9<L>  /  Alb  3.0<L>  /  TBili  0.3  /  DBili  x   /  AST  15  /  ALT  25  /  AlkPhos  51  10-17    PT/INR - ( 18 Oct 2023 06:11 )   PT: 13.1 sec;   INR: 1.20 ratio         PTT - ( 18 Oct 2023 06:11 )  PTT:31.2 sec  CARDIAC MARKERS ( 14 Oct 2023 06:18 )  752 ng/L / x     / x     / 202 U/L / x     / 25.5 ng/mL        Total Cholesterol: 167  LDL: --  HDL: 52  T    Interpretation of Telemetry: A-V paced

## 2023-10-18 NOTE — PROGRESS NOTE ADULT - ASSESSMENT
58M hx CVA, HTN, DM2, vertigo, HLD, Mobitz II s/p ICD p/w NSTEMI to [facility], transferred to [facility], [facility] revealed 3v disease, undergoing CABG eval

## 2023-10-18 NOTE — PROGRESS NOTE ADULT - ATTENDING COMMENTS
58 year old man with prior stroke, mild residual deficits, transferred from [facility] for new severely reduced ejection fraction, prior history high grade heart block, ICD-P. Coronary angiography with severe 3 vessel disease, CT Surgery indicated if have adequate myocardial viability. To have cardiac MRI. Advanced Heart Failure evaluation appreciated and if to proceed with coronary revascularization will have right heart catheterization and consideration for perioperative hemodynamic support.    To contact call Cardiology Fellow or Attending as listed on [email or web address] password: [email or web address].

## 2023-10-18 NOTE — PROGRESS NOTE ADULT - PROBLEM SELECTOR PLAN 1
LHC revealed 3v disease, undergoing CABG evaluation by CTS  Continue ASA, metoprolol, atorvastatin   Cardiac MRI pending to assess viability  Continue heparin drip for possible LV thrombus  HF following- plan for RHC with consideration for perioperative hemodynamic support

## 2023-10-18 NOTE — PROGRESS NOTE ADULT - ASSESSMENT
57 YO M with a history of CVA with residual mild R paresthesias, second degree Mobitz II heart block s/p DC-ICD 12/2022 (initial concern for sarcoid but negative biopsy/PET post procedure), DM2 (A1c 8.4%), and HTN who was admitted to [facility] with chest pain and dyspnea, found to have NSTEMI with markedly elevated troponins and new severe LV dysfunction with regional wall motion abnormalities as well as + enterovirus. He required NIPPV and was diuresed before being transferred to [facility] where LHC performed which revealed severe 3v CAD with critical proximal LAD involvement. CTS was consulted for CABG evaluation and HF consulted for comanagement.    He appears euvolemic on exam and is normotensive. However he is tachycardic and has poor non-invasive hemodynamics. He has a class 1 indication for CABG however will need to clarify invasive hemodynamics first to assess he if can tolerate conventional cardiac surgery and likely will need to launch pre-op advanced therapies evaluation. If he does undergo surgery would likely benefit from margarito-operative MCS, potentially Impella 5.5.     Awaiting RHC and cMRI to help plan best short and long term strategy.     REVIEW OF STUDIES  EKG: sinus tachycardia, septal q-waves, left axis deviation   TTE 10/16/23: LV 5.5 cm, LVEF 20% with regional WMAs worse in the apex, LVOT VTI 8 cm, normal RV size/function, severe functional MR, small pericardial effusion, estimated RA pressure 8 mmHg   TTE 12/2022: normal LVEF   LHC: 95% discrete proximal LAD disease and sequential multifocal disease with good distal target, 80-90% proximal LCx disease just prior to marginals, severe multifocal RCA disease with good targets     PLAN  # Acute systolic heart failure  - Etiology: ischemic  - GDMT: continue metoprolol succinate 25 mg daily, increase hydralizine to 25 mg TID. deferring RAASi/MRA/SGLT2i given likely surgery   - Diuretics: current regimen is bumex 1 mg PO daily, euvolemic will continue but will reassess on RHC    - Device: has ICD in place and notably with high burden of RV pacing, if undergoing CABG would benefit from epicardial lead at time of surgery for CRT pacing post-operatively   - Advanced therapies: severe LV dysfunction with tachycardia and poor non-invasive hemodynamics concerning, will plan for pre-op RHC today and if hemos poor will plan for VAD/transplant evaluation, either as backup post CABG or as primary plan. has good support and no clear psychosocial red flags. ABO AB. would need neurologic assessment with prior CVA and ideally improved conditioning given ongoing prolonged hospitalization.     # Severe functional MR  - Will need MVr/MVR at time of surgery if getting CABG which also increases post-operative risk     # Severe 3v CAD  - On ASA/statin, being considered for surgery but needs hemodynamics   - cMRI pending.

## 2023-10-18 NOTE — PROGRESS NOTE ADULT - PROBLEM SELECTOR PLAN 3
Stable- ceatinine range 1.27-1.57 at [facility]- [facility]

## 2023-10-18 NOTE — PROGRESS NOTE ADULT - SUBJECTIVE AND OBJECTIVE BOX
Subjective:  No overnight events    Medications:  acetaminophen     Tablet .. 650 milliGRAM(s) Oral every 6 hours PRN  albuterol    90 MICROgram(s) HFA Inhaler 2 Puff(s) Inhalation every 6 hours PRN  aspirin enteric coated 81 milliGRAM(s) Oral daily  atorvastatin 80 milliGRAM(s) Oral at bedtime  buMETAnide 1 milliGRAM(s) Oral daily  dextrose 5%. 1000 milliLiter(s) IV Continuous <Continuous>  dextrose 5%. 1000 milliLiter(s) IV Continuous <Continuous>  dextrose 50% Injectable 25 Gram(s) IV Push once  dextrose 50% Injectable 25 Gram(s) IV Push once  dextrose 50% Injectable 12.5 Gram(s) IV Push once  dextrose Oral Gel 15 Gram(s) Oral once PRN  glucagon  Injectable 1 milliGRAM(s) IntraMuscular once  guaiFENesin Oral Liquid (Sugar-Free) 100 milliGRAM(s) Oral every 6 hours PRN  heparin   Injectable 2500 Unit(s) IV Push every 6 hours PRN  heparin   Injectable 5500 Unit(s) IV Push every 6 hours PRN  heparin  Infusion.  Unit(s)/Hr IV Continuous <Continuous>  hydrALAZINE 25 milliGRAM(s) Oral every 8 hours  insulin glargine Injectable (LANTUS) 30 Unit(s) SubCutaneous at bedtime  insulin lispro (ADMELOG) corrective regimen sliding scale   SubCutaneous three times a day before meals  insulin lispro (ADMELOG) corrective regimen sliding scale   SubCutaneous at bedtime  insulin lispro Injectable (ADMELOG) 10 Unit(s) SubCutaneous three times a day before meals  metoprolol succinate ER 25 milliGRAM(s) Oral daily  pantoprazole    Tablet 40 milliGRAM(s) Oral before breakfast  tamsulosin 0.4 milliGRAM(s) Oral at bedtime    Vitals:  T(C): 36.8 (10-18-23 @ 11:31), Max: 37.1 (10-17-23 @ 19:51)  HR: 116 (10-18-23 @ 11:31) (115 - 123)  BP: 103/72 (10-18-23 @ 11:31) (95/63 - 107/71)  BP(mean): --  RR: 18 (10-18-23 @ 11:31) (16 - 18)  SpO2: 96% (10-18-23 @ 11:31) (94% - 97%)    Daily     Daily Weight in k.1 (18 Oct 2023 06:26)    Weight (kg): 70 (10-17 @ 08:44)    I&O's Summary    17 Oct 2023 07:01  -  18 Oct 2023 07:00  --------------------------------------------------------  IN: 660 mL / OUT: 0 mL / NET: 660 mL    18 Oct 2023 07:01  -  18 Oct 2023 14:17  --------------------------------------------------------  IN: 480 mL / OUT: 0 mL / NET: 480 mL        Physical Exam:  Appearance: No Acute Distress  HEENT: JVP non elevated  Cardiovascular: tachycardic, possible S3  Respiratory: Clear to auscultation bilaterally  Gastrointestinal: Soft, Non-tender, non-distended	  Skin: no skin lesions  Neurologic: Non-focal  Extremities: No LE edema, warm and well perfused  Psychiatry: A & O x 3, Mood & affect appropriate      Labs:                        10.9   8.61  )-----------( 303      ( 17 Oct 2023 18:49 )             35.7     10-17    138  |  107  |  32<H>  ----------------------------<  183<H>  3.8   |  20<L>  |  1.54<H>    Ca    8.8      17 Oct 2023 07:58    TPro  5.9<L>  /  Alb  3.0<L>  /  TBili  0.3  /  DBili  x   /  AST  15  /  ALT  25  /  AlkPhos  51  10-17      PT/INR - ( 18 Oct 2023 06:11 )   PT: 13.1 sec;   INR: 1.20 ratio         PTT - ( 18 Oct 2023 06:11 )  PTT:31.2 sec

## 2023-10-19 DIAGNOSIS — I25.10 ATHEROSCLEROTIC HEART DISEASE OF NATIVE CORONARY ARTERY WITHOUT ANGINA PECTORIS: ICD-10-CM

## 2023-10-19 LAB
ANION GAP SERPL CALC-SCNC: 13 MMOL/L — SIGNIFICANT CHANGE UP (ref 5–17)
ANION GAP SERPL CALC-SCNC: 13 MMOL/L — SIGNIFICANT CHANGE UP (ref 5–17)
APTT BLD: 75.8 SEC — HIGH (ref 24.5–35.6)
APTT BLD: 75.8 SEC — HIGH (ref 24.5–35.6)
BUN SERPL-MCNC: 29 MG/DL — HIGH (ref 7–23)
BUN SERPL-MCNC: 29 MG/DL — HIGH (ref 7–23)
CALCIUM SERPL-MCNC: 8.6 MG/DL — SIGNIFICANT CHANGE UP (ref 8.4–10.5)
CALCIUM SERPL-MCNC: 8.6 MG/DL — SIGNIFICANT CHANGE UP (ref 8.4–10.5)
CHLORIDE SERPL-SCNC: 104 MMOL/L — SIGNIFICANT CHANGE UP (ref 96–108)
CHLORIDE SERPL-SCNC: 104 MMOL/L — SIGNIFICANT CHANGE UP (ref 96–108)
CO2 SERPL-SCNC: 21 MMOL/L — LOW (ref 22–31)
CO2 SERPL-SCNC: 21 MMOL/L — LOW (ref 22–31)
CREAT SERPL-MCNC: 1.6 MG/DL — HIGH (ref 0.5–1.3)
CREAT SERPL-MCNC: 1.6 MG/DL — HIGH (ref 0.5–1.3)
EGFR: 50 ML/MIN/1.73M2 — LOW
EGFR: 50 ML/MIN/1.73M2 — LOW
GLUCOSE BLDC GLUCOMTR-MCNC: 103 MG/DL — HIGH (ref 70–99)
GLUCOSE BLDC GLUCOMTR-MCNC: 103 MG/DL — HIGH (ref 70–99)
GLUCOSE BLDC GLUCOMTR-MCNC: 116 MG/DL — HIGH (ref 70–99)
GLUCOSE BLDC GLUCOMTR-MCNC: 116 MG/DL — HIGH (ref 70–99)
GLUCOSE BLDC GLUCOMTR-MCNC: 127 MG/DL — HIGH (ref 70–99)
GLUCOSE BLDC GLUCOMTR-MCNC: 127 MG/DL — HIGH (ref 70–99)
GLUCOSE BLDC GLUCOMTR-MCNC: 132 MG/DL — HIGH (ref 70–99)
GLUCOSE BLDC GLUCOMTR-MCNC: 132 MG/DL — HIGH (ref 70–99)
GLUCOSE SERPL-MCNC: 124 MG/DL — HIGH (ref 70–99)
GLUCOSE SERPL-MCNC: 124 MG/DL — HIGH (ref 70–99)
HCT VFR BLD CALC: 34.5 % — LOW (ref 39–50)
HCT VFR BLD CALC: 34.5 % — LOW (ref 39–50)
HGB BLD-MCNC: 10.9 G/DL — LOW (ref 13–17)
HGB BLD-MCNC: 10.9 G/DL — LOW (ref 13–17)
HGB FLD-MCNC: 11 G/DL — LOW (ref 12.6–17.4)
MCHC RBC-ENTMCNC: 25.3 PG — LOW (ref 27–34)
MCHC RBC-ENTMCNC: 25.3 PG — LOW (ref 27–34)
MCHC RBC-ENTMCNC: 31.6 GM/DL — LOW (ref 32–36)
MCHC RBC-ENTMCNC: 31.6 GM/DL — LOW (ref 32–36)
MCV RBC AUTO: 80.2 FL — SIGNIFICANT CHANGE UP (ref 80–100)
MCV RBC AUTO: 80.2 FL — SIGNIFICANT CHANGE UP (ref 80–100)
NRBC # BLD: 0 /100 WBCS — SIGNIFICANT CHANGE UP (ref 0–0)
NRBC # BLD: 0 /100 WBCS — SIGNIFICANT CHANGE UP (ref 0–0)
OXYHGB MFR BLDMV: 55.5 % — LOW (ref 90–95)
OXYHGB MFR BLDMV: 55.5 % — LOW (ref 90–95)
OXYHGB MFR BLDMV: 56 % — LOW (ref 90–95)
OXYHGB MFR BLDMV: 56 % — LOW (ref 90–95)
PLATELET # BLD AUTO: 241 K/UL — SIGNIFICANT CHANGE UP (ref 150–400)
PLATELET # BLD AUTO: 241 K/UL — SIGNIFICANT CHANGE UP (ref 150–400)
POTASSIUM SERPL-MCNC: 3.6 MMOL/L — SIGNIFICANT CHANGE UP (ref 3.5–5.3)
POTASSIUM SERPL-MCNC: 3.6 MMOL/L — SIGNIFICANT CHANGE UP (ref 3.5–5.3)
POTASSIUM SERPL-SCNC: 3.6 MMOL/L — SIGNIFICANT CHANGE UP (ref 3.5–5.3)
POTASSIUM SERPL-SCNC: 3.6 MMOL/L — SIGNIFICANT CHANGE UP (ref 3.5–5.3)
RBC # BLD: 4.3 M/UL — SIGNIFICANT CHANGE UP (ref 4.2–5.8)
RBC # BLD: 4.3 M/UL — SIGNIFICANT CHANGE UP (ref 4.2–5.8)
RBC # FLD: 15.1 % — HIGH (ref 10.3–14.5)
RBC # FLD: 15.1 % — HIGH (ref 10.3–14.5)
SAO2 % BLD: 56.7 % — LOW (ref 60–90)
SAO2 % BLD: 56.7 % — LOW (ref 60–90)
SAO2 % BLD: 57.3 % — LOW (ref 60–90)
SAO2 % BLD: 57.3 % — LOW (ref 60–90)
SODIUM SERPL-SCNC: 138 MMOL/L — SIGNIFICANT CHANGE UP (ref 135–145)
SODIUM SERPL-SCNC: 138 MMOL/L — SIGNIFICANT CHANGE UP (ref 135–145)
WBC # BLD: 8.11 K/UL — SIGNIFICANT CHANGE UP (ref 3.8–10.5)
WBC # BLD: 8.11 K/UL — SIGNIFICANT CHANGE UP (ref 3.8–10.5)
WBC # FLD AUTO: 8.11 K/UL — SIGNIFICANT CHANGE UP (ref 3.8–10.5)
WBC # FLD AUTO: 8.11 K/UL — SIGNIFICANT CHANGE UP (ref 3.8–10.5)

## 2023-10-19 PROCEDURE — 93010 ELECTROCARDIOGRAM REPORT: CPT

## 2023-10-19 PROCEDURE — 99233 SBSQ HOSP IP/OBS HIGH 50: CPT | Mod: GC,25

## 2023-10-19 PROCEDURE — 99233 SBSQ HOSP IP/OBS HIGH 50: CPT

## 2023-10-19 PROCEDURE — 93451 RIGHT HEART CATH: CPT | Mod: 26

## 2023-10-19 RX ORDER — METOPROLOL TARTRATE 50 MG
50 TABLET ORAL DAILY
Refills: 0 | Status: DISCONTINUED | OUTPATIENT
Start: 2023-10-20 | End: 2023-10-20

## 2023-10-19 RX ORDER — HEPARIN SODIUM 5000 [USP'U]/ML
5000 INJECTION INTRAVENOUS; SUBCUTANEOUS EVERY 8 HOURS
Refills: 0 | Status: DISCONTINUED | OUTPATIENT
Start: 2023-10-19 | End: 2023-10-27

## 2023-10-19 RX ORDER — METOPROLOL TARTRATE 50 MG
25 TABLET ORAL ONCE
Refills: 0 | Status: COMPLETED | OUTPATIENT
Start: 2023-10-19 | End: 2023-10-19

## 2023-10-19 RX ORDER — SENNA PLUS 8.6 MG/1
2 TABLET ORAL AT BEDTIME
Refills: 0 | Status: DISCONTINUED | OUTPATIENT
Start: 2023-10-19 | End: 2023-11-21

## 2023-10-19 RX ORDER — POLYETHYLENE GLYCOL 3350 17 G/17G
17 POWDER, FOR SOLUTION ORAL DAILY
Refills: 0 | Status: DISCONTINUED | OUTPATIENT
Start: 2023-10-19 | End: 2023-11-21

## 2023-10-19 RX ADMIN — Medication 25 MILLIGRAM(S): at 09:40

## 2023-10-19 RX ADMIN — HEPARIN SODIUM 5000 UNIT(S): 5000 INJECTION INTRAVENOUS; SUBCUTANEOUS at 12:47

## 2023-10-19 RX ADMIN — PANTOPRAZOLE SODIUM 40 MILLIGRAM(S): 20 TABLET, DELAYED RELEASE ORAL at 05:35

## 2023-10-19 RX ADMIN — TAMSULOSIN HYDROCHLORIDE 0.4 MILLIGRAM(S): 0.4 CAPSULE ORAL at 22:34

## 2023-10-19 RX ADMIN — Medication 10 UNIT(S): at 12:06

## 2023-10-19 RX ADMIN — Medication 100 MILLIGRAM(S): at 23:47

## 2023-10-19 RX ADMIN — Medication 25 MILLIGRAM(S): at 22:34

## 2023-10-19 RX ADMIN — ATORVASTATIN CALCIUM 80 MILLIGRAM(S): 80 TABLET, FILM COATED ORAL at 22:34

## 2023-10-19 RX ADMIN — POLYETHYLENE GLYCOL 3350 17 GRAM(S): 17 POWDER, FOR SOLUTION ORAL at 08:15

## 2023-10-19 RX ADMIN — Medication 10 MILLIGRAM(S): at 12:47

## 2023-10-19 RX ADMIN — Medication 25 MILLIGRAM(S): at 08:18

## 2023-10-19 RX ADMIN — BUMETANIDE 1 MILLIGRAM(S): 0.25 INJECTION INTRAMUSCULAR; INTRAVENOUS at 08:18

## 2023-10-19 RX ADMIN — Medication 81 MILLIGRAM(S): at 11:26

## 2023-10-19 RX ADMIN — Medication 25 MILLIGRAM(S): at 16:54

## 2023-10-19 RX ADMIN — HEPARIN SODIUM 5000 UNIT(S): 5000 INJECTION INTRAVENOUS; SUBCUTANEOUS at 22:35

## 2023-10-19 RX ADMIN — Medication 25 MILLIGRAM(S): at 22:35

## 2023-10-19 RX ADMIN — HEPARIN SODIUM 1200 UNIT(S)/HR: 5000 INJECTION INTRAVENOUS; SUBCUTANEOUS at 06:48

## 2023-10-19 RX ADMIN — Medication 10 UNIT(S): at 08:15

## 2023-10-19 RX ADMIN — INSULIN GLARGINE 30 UNIT(S): 100 INJECTION, SOLUTION SUBCUTANEOUS at 22:51

## 2023-10-19 RX ADMIN — Medication 10 UNIT(S): at 17:09

## 2023-10-19 NOTE — PROGRESS NOTE ADULT - ATTENDING COMMENTS
58 year old man with prior stroke, mild residual deficits, transferred from [facility] for new severely reduced ejection fraction, prior history high grade heart block, ICD-P. Coronary angiography with severe 3 vessel disease, Cardiac MRI indicates non-viable myocardium in portion of LAD distribution, but overall apparent sufficient regions that will benefit from revascularization. Continuing to review with CT Surgery and Advanced Heart Failure tp determine if will proceed with coronary revascularization surgery. Plan right heart catheterization and consideration for perioperative hemodynamic support.    To contact call Cardiology Fellow or Attending as listed on [email or web address] password: [email or web address]. 58 year old man with prior stroke, mild residual deficits, transferred from [facility] for new severely reduced ejection fraction, prior history high grade heart block, ICD-P. Coronary angiography with severe 3 vessel disease, Cardiac MRI indicates non-viable myocardium in portion of LAD distribution, but overall apparent sufficient regions that will benefit from revascularization. Continuing to review with CT Surgery and Advanced Heart Failure tp determine if will proceed with coronary revascularization surgery. Plan right heart catheterization and consideration for perioperative hemodynamic support. Have reviewed cardiac MRI and unclear that will derive much benefit from revascularization of LAD, however appears that revascularization of circumflex and RCA has potential benefit.    To contact call Cardiology Fellow or Attending as listed on [email or web address] password: [email or web address].

## 2023-10-19 NOTE — PROGRESS NOTE ADULT - ASSESSMENT
58M hx CVA, HTN, DM2, vertigo, HLD, Mobitz II s/p ICD p/w NSTEMI to [facility], transferred to [facility], [facility] revealed 3v disease, undergoing CABG eval

## 2023-10-19 NOTE — PROGRESS NOTE ADULT - ASSESSMENT
59 YO M with a history of CVA with residual mild R paresthesias, second degree Mobitz II heart block s/p DC-ICD 12/2022 (initial concern for sarcoid but negative biopsy/PET post procedure), DM2 (A1c 8.4%), and HTN who was admitted to [facility] with chest pain and dyspnea, found to have NSTEMI with markedly elevated troponins and new severe LV dysfunction with regional wall motion abnormalities as well as + enterovirus. He required NIPPV and was diuresed before being transferred to [facility] where LHC performed which revealed severe 3v CAD with critical proximal LAD involvement. CTS was consulted for CABG evaluation and HF consulted for comanagement.    He appears euvolemic on exam and is normotensive. However he is tachycardic and has poor non-invasive hemodynamics. He has a class 1 indication for CABG however will need to clarify invasive hemodynamics first to assess he if can tolerate conventional cardiac surgery and likely will need to launch pre-op advanced therapies evaluation. If he does undergo surgery would likely benefit from margarito-operative MCS, potentially Impella 5.5.     Awaiting RHC help plan best short and long term strategy.     REVIEW OF STUDIES  EKG: sinus tachycardia, septal q-waves, left axis deviation   TTE 10/16/23: LV 5.5 cm, LVEF 20% with regional WMAs worse in the apex, LVOT VTI 8 cm, normal RV size/function, severe functional MR, small pericardial effusion, estimated RA pressure 8 mmHg   TTE 12/2022: normal LVEF   LHC: 95% discrete proximal LAD disease and sequential multifocal disease with good distal target, 80-90% proximal LCx disease just prior to marginals, severe multifocal RCA disease with good targets     PLAN  # Acute systolic heart failure  - Etiology: ischemic  - GDMT: continue metoprolol succinate 25 mg daily, increase hydralizine to 25 mg TID. deferring RAASi/MRA/SGLT2i given likely surgery   - Diuretics: current regimen is bumex 1 mg PO daily, euvolemic will continue but will reassess on RHC    - Device: has ICD in place and notably with high burden of RV pacing, if undergoing CABG would benefit from epicardial lead at time of surgery for CRT pacing post-operatively   - Advanced therapies: severe LV dysfunction with tachycardia and poor non-invasive hemodynamics concerning, will plan for pre-op RHC today and if hemos poor will plan for VAD/transplant evaluation, either as backup post CABG or as primary plan. has good support and no clear psychosocial red flags. ABO AB. would need neurologic assessment with prior CVA and ideally improved conditioning given ongoing prolonged hospitalization.       # Severe 3v CAD  - On ASA/statin, being considered for surgery but needs hemodynamics   - cMRI pending.

## 2023-10-19 NOTE — PROGRESS NOTE ADULT - SUBJECTIVE AND OBJECTIVE BOX
William Garcia MD    Patient is a 58y old  Male who presents with a chief complaint of NSTEMI (19 Oct 2023 05:54)        SUBJECTIVE / OVERNIGHT EVENTS: Patient seen and examined. No new events/complaints  TELEMETRY: -140's      MEDICATIONS  (STANDING):  aspirin enteric coated 81 milliGRAM(s) Oral daily  atorvastatin 80 milliGRAM(s) Oral at bedtime  buMETAnide 1 milliGRAM(s) Oral daily  dextrose 5%. 1000 milliLiter(s) (100 mL/Hr) IV Continuous <Continuous>  dextrose 5%. 1000 milliLiter(s) (50 mL/Hr) IV Continuous <Continuous>  dextrose 50% Injectable 12.5 Gram(s) IV Push once  dextrose 50% Injectable 25 Gram(s) IV Push once  dextrose 50% Injectable 25 Gram(s) IV Push once  glucagon  Injectable 1 milliGRAM(s) IntraMuscular once  heparin   Injectable 5000 Unit(s) SubCutaneous every 8 hours  hydrALAZINE 25 milliGRAM(s) Oral every 8 hours  insulin glargine Injectable (LANTUS) 30 Unit(s) SubCutaneous at bedtime  insulin lispro (ADMELOG) corrective regimen sliding scale   SubCutaneous three times a day before meals  insulin lispro (ADMELOG) corrective regimen sliding scale   SubCutaneous at bedtime  insulin lispro Injectable (ADMELOG) 10 Unit(s) SubCutaneous three times a day before meals  metoprolol succinate ER 25 milliGRAM(s) Oral daily  pantoprazole    Tablet 40 milliGRAM(s) Oral before breakfast  tamsulosin 0.4 milliGRAM(s) Oral at bedtime    MEDICATIONS  (PRN):  acetaminophen     Tablet .. 650 milliGRAM(s) Oral every 6 hours PRN Temp greater or equal to 38C (100.4F), Mild Pain (1 - 3)  albuterol    90 MICROgram(s) HFA Inhaler 2 Puff(s) Inhalation every 6 hours PRN Shortness of Breath and/or Wheezing  dextrose Oral Gel 15 Gram(s) Oral once PRN Blood Glucose LESS THAN 70 milliGRAM(s)/deciliter  guaiFENesin Oral Liquid (Sugar-Free) 100 milliGRAM(s) Oral every 6 hours PRN Cough  polyethylene glycol 3350 17 Gram(s) Oral daily PRN Constipation      Vital Signs Last 24 Hrs  T(C): 36.6 (19 Oct 2023 08:10), Max: 37.2 (18 Oct 2023 20:28)  T(F): 97.8 (19 Oct 2023 08:10), Max: 98.9 (18 Oct 2023 20:28)  HR: 120 (19 Oct 2023 09:30) (116 - 126)  BP: 93/64 (19 Oct 2023 09:30) (93/64 - 108/81)  BP(mean): --  RR: 18 (19 Oct 2023 08:10) (16 - 18)  SpO2: 96% (19 Oct 2023 08:10) (90% - 96%)    Parameters below as of 19 Oct 2023 08:10  Patient On (Oxygen Delivery Method): room air      CAPILLARY BLOOD GLUCOSE      POCT Blood Glucose.: 127 mg/dL (19 Oct 2023 07:50)  POCT Blood Glucose.: 242 mg/dL (18 Oct 2023 20:59)  POCT Blood Glucose.: 143 mg/dL (18 Oct 2023 16:55)  POCT Blood Glucose.: 109 mg/dL (18 Oct 2023 11:51)  POCT Blood Glucose.: 92 mg/dL (18 Oct 2023 11:17)    I&O's Summary    18 Oct 2023 07:01  -  19 Oct 2023 07:00  --------------------------------------------------------  IN: 900 mL / OUT: 0 mL / NET: 900 mL    19 Oct 2023 07:01  -  19 Oct 2023 10:51  --------------------------------------------------------  IN: 240 mL / OUT: 0 mL / NET: 240 mL          PHYSICAL EXAM  GENERAL: NAD, well-developed  HEAD:  Atraumatic, normocephalic  EYES: EOMI, PERRLA, conjunctiva and sclera clear  CHEST/LUNG: Clear to auscultation bilaterally; no wheezes  HEART: +S1+S2, tachy, regular  ABDOMEN: Soft, nontender, nondistended; bowel sounds present  EXTREMITIES:  2+ peripheral pulses; no clubbing, cyanosis, or edema  PSYCH: AAOx2-3      LABS:                        10.9   8.11  )-----------( 241      ( 19 Oct 2023 06:22 )             34.5     10-19    138  |  104  |  29<H>  ----------------------------<  124<H>  3.6   |  21<L>  |  1.60<H>    Ca    8.6      19 Oct 2023 06:22      PT/INR - ( 18 Oct 2023 06:11 )   PT: 13.1 sec;   INR: 1.20 ratio         PTT - ( 19 Oct 2023 06:22 )  PTT:75.8 sec      Urinalysis Basic - ( 19 Oct 2023 06:22 )    Color: x / Appearance: x / SG: x / pH: x  Gluc: 124 mg/dL / Ketone: x  / Bili: x / Urobili: x   Blood: x / Protein: x / Nitrite: x   Leuk Esterase: x / RBC: x / WBC x   Sq Epi: x / Non Sq Epi: x / Bacteria: x          RADIOLOGY & ADDITIONAL TESTS:    Imaging Personally Reviewed:  Consultant(s) Notes Reviewed:    Care Discussed with Consultants/Other Providers:

## 2023-10-19 NOTE — CHART NOTE - NSCHARTNOTEFT_GEN_A_CORE
Notified by RN pt with sinus tach on tele 130s up to 140s. Of note pt baseline rhythm sinus tach 120s-130s per tele tech. Pt seen at bedside. Reports feeling occasional palpitations. Denies chest pain, SOB, headache, nausea, vomiting.   12 lead EKG obtained and reviewed. Discussed with cardiology fellow who advised following plan.  > give toprol 25mg now  > increase daily toprol dose to 50mg  > continue tele monitoring  > discussed plan with RN   > cardiology to follow in AM    Krista Wise PA-C  Department of Medicine  [id number]

## 2023-10-19 NOTE — PROGRESS NOTE ADULT - PROBLEM SELECTOR PLAN 1
LHC revealed 3v disease, undergoing CABG evaluation by CTS  Continue ASA, metoprolol, atorvastatin   Cardiac MRI with sufficient regions to benefit from revascularization  D/C heparin drip- no LV thrombus on MRI  HF following- plan for RHC with consideration for perioperative hemodynamic support

## 2023-10-19 NOTE — PROGRESS NOTE ADULT - NS ATTEND AMEND GEN_ALL_CORE FT
57 YO M with a history of CVA with residual mild R paresthesias, second degree Mobitz II heart block s/p DC-ICD 12/2022 (initial concern for sarcoid but negative biopsy/PET post procedure), DM2 (A1c 8.4%), and HTN who was admitted to [facility] with chest pain and dyspnea, found to have NSTEMI with markedly elevated troponins and new severe LV dysfunction with regional wall motion abnormalities as well as + enterovirus. He required NIPPV and was diuresed before being transferred to [facility] where LHC performed which revealed severe 3v CAD with critical proximal LAD involvement. CTS was consulted for CABG evaluation and HF consulted for comanagement.    He appears euvolemic on exam and is normotensive. However he is tachycardic and has poor non-invasive hemodynamics. His cMRI also shows limited viability. Given severe heart failure with limited viability, suspect he is at prohibitive risk for conventional cardiac surgery. Will obtain RHC and consider percutaneous revascularization vs advanced therapies evaluation.     REVIEW OF STUDIES  EKG: sinus tachycardia, septal q-waves, left axis deviation   TTE 10/16/23: LV 5.5 cm, LVEF 20% with regional WMAs worse in the apex, LVOT VTI 8 cm, normal RV size/function, severe functional MR, small pericardial effusion, estimated RA pressure 8 mmHg   TTE 12/2022: normal LVEF   LHC: 95% discrete proximal LAD disease and sequential multifocal disease with good distal target, 80-90% proximal LCx disease just prior to marginals, severe multifocal RCA disease with good targets   cMRI: LVEF 13%, greater than 50% LGE in mid LAD territory     PLAN  # Acute systolic heart failure  - Etiology: ischemic  - GDMT: continue metoprolol succinate 25 mg daily, continue hydralizine to 25 mg TID. deferring RAASi/MRA/SGLT2i given flucuating renal function   - Diuretics: current regimen is bumex 1 mg PO daily, euvolemic will continue but will reassess on RHC    - Device: has ICD in place and notably with high burden of RV pacing, pending ultimate decision may need to consider CRT upgrade or epicardial LV lead   - Advanced therapies: severe LV dysfunction with tachycardia and poor non-invasive hemodynamics concerning, will plan for pre-op RHC today and if hemos poor will plan for VAD/transplant evaluation, either as backup post CABG or as primary plan. has good support and no clear psychosocial red flags. ABO AB. would need neurologic assessment with prior CVA and ideally improved conditioning given ongoing prolonged hospitalization.     # Severe functional MR  - Will need MVr/MVR at time of surgery if getting CABG which also increases post-operative risk     # Severe 3v CAD  - On ASA/statin, being considered for surgery but needs hemodynamics   - cMRI pending. 57 YO M with a history of CVA with residual mild R paresthesias, second degree Mobitz II heart block s/p DC-ICD 12/2022 (initial concern for sarcoid but negative biopsy/PET post procedure), DM2 (A1c 8.4%), and HTN who was admitted to [facility] with chest pain and dyspnea, found to have NSTEMI with markedly elevated troponins and new severe LV dysfunction with regional wall motion abnormalities as well as + enterovirus. He required NIPPV and was diuresed before being transferred to [facility] where LHC performed which revealed severe 3v CAD with critical proximal LAD involvement. CTS was consulted for CABG evaluation and HF consulted for comanagement.    He appears euvolemic on exam and is normotensive. However he is tachycardic and has poor non-invasive hemodynamics. His cMRI also shows limited viability. Given severe heart failure with limited viability, suspect he is at prohibitive risk for conventional cardiac surgery. Will obtain RHC and consider percutaneous revascularization vs advanced therapies evaluation.     REVIEW OF STUDIES  EKG: sinus tachycardia, septal q-waves, left axis deviation   TTE 10/16/23: LV 5.5 cm, LVEF 20% with regional WMAs worse in the apex, LVOT VTI 8 cm, normal RV size/function, severe functional MR, small pericardial effusion, estimated RA pressure 8 mmHg   TTE 12/2022: normal LVEF   LHC: 95% discrete proximal LAD disease and sequential multifocal disease with good distal target, 80-90% proximal LCx disease just prior to marginals, severe multifocal RCA disease with good targets   cMRI: LVEF 13%, greater than 50% LGE in mid LAD territory     PLAN  # Acute systolic heart failure  - Etiology: ischemic  - GDMT: continue metoprolol succinate 25 mg daily, continue hydralizine to 25 mg TID. deferring RAASi/MRA/SGLT2i given fluctuating renal function   - Diuretics: current regimen is bumex 1 mg PO daily, euvolemic will continue but will reassess on RHC    - Device: has ICD in place and notably with high burden of RV pacing, pending ultimate decision may need to consider CRT upgrade or epicardial LV lead   - Advanced therapies: severe LV dysfunction with tachycardia and poor non-invasive hemodynamics with limited viability concerning, will plan for RHC today and if hemos poor will discuss either multivessel PCI or VAD/transplant evaluation. has good support and no clear psychosocial red flags. ABO AB. would need neurologic assessment with prior CVA and ideally improved conditioning given ongoing prolonged hospitalization.     # Severe functional MR  - needs reassessment after revascularization or MVR/MVr if getting CABG    # Severe 3v CAD  - On ASA/statin, cMRI with limited LAD viability.  - Evidence doesn't definitively support using viability to guide revascularization decision, will review films with IC to see if lesions are easily stentable if not a surgical candidate.

## 2023-10-19 NOTE — PROGRESS NOTE ADULT - PROBLEM SELECTOR PLAN 1
- Etiology: ischemic  - GDMT: continue metoprolol succinate 25 mg daily and hydralazine to 25 mg TID. deferring RAASi/MRA/SGLT2i given likely surgery   - Diuretics: current regimen is bumex 1 mg PO daily, euvolemic will continue but will reassess on RHC    - Device: has ICD in place and notably with high burden of RV pacing, if undergoing CABG would benefit from epicardial lead at time of surgery for CRT pacing post-operatively   - Advanced therapies: severe LV dysfunction with tachycardia and poor non-invasive hemodynamics concerning, will plan for pre-op RHC today and if hemos poor will plan for VAD/transplant evaluation, either as backup post CABG or as primary plan. has good support and no clear psychosocial red flags. ABO AB. would need neurologic assessment with prior CVA and ideally improved conditioning given ongoing prolonged hospitalization.

## 2023-10-19 NOTE — PROGRESS NOTE ADULT - SUBJECTIVE AND OBJECTIVE BOX
ADVANCED HEART FAILURE & TRANSPLANT- PROGRESS NOTE  *To reach the NS1 Team from 8am to 5pm, please call [phone].  ___________________________________________________________________________    Subjective:  - no issues     Medications:  acetaminophen     Tablet .. 650 milliGRAM(s) Oral every 6 hours PRN  albuterol    90 MICROgram(s) HFA Inhaler 2 Puff(s) Inhalation every 6 hours PRN  aspirin enteric coated 81 milliGRAM(s) Oral daily  atorvastatin 80 milliGRAM(s) Oral at bedtime  buMETAnide 1 milliGRAM(s) Oral daily  dextrose 5%. 1000 milliLiter(s) IV Continuous <Continuous>  dextrose 5%. 1000 milliLiter(s) IV Continuous <Continuous>  dextrose 50% Injectable 25 Gram(s) IV Push once  dextrose 50% Injectable 12.5 Gram(s) IV Push once  dextrose 50% Injectable 25 Gram(s) IV Push once  dextrose Oral Gel 15 Gram(s) Oral once PRN  glucagon  Injectable 1 milliGRAM(s) IntraMuscular once  guaiFENesin Oral Liquid (Sugar-Free) 100 milliGRAM(s) Oral every 6 hours PRN  heparin   Injectable 5000 Unit(s) SubCutaneous every 8 hours  hydrALAZINE 25 milliGRAM(s) Oral every 8 hours  insulin glargine Injectable (LANTUS) 30 Unit(s) SubCutaneous at bedtime  insulin lispro (ADMELOG) corrective regimen sliding scale   SubCutaneous three times a day before meals  insulin lispro (ADMELOG) corrective regimen sliding scale   SubCutaneous at bedtime  insulin lispro Injectable (ADMELOG) 10 Unit(s) SubCutaneous three times a day before meals  metoprolol succinate ER 25 milliGRAM(s) Oral daily  pantoprazole    Tablet 40 milliGRAM(s) Oral before breakfast  polyethylene glycol 3350 17 Gram(s) Oral daily PRN  senna 2 Tablet(s) Oral at bedtime  tamsulosin 0.4 milliGRAM(s) Oral at bedtime        Vitals:  Vital Signs Last 24 Hours  T(C): 36.6 (10-19-23 @ 14:26), Max: 37.2 (10-18-23 @ 20:28)  HR: 122 (10-19-23 @ 14:26) (118 - 126)  BP: 97/69 (10-19-23 @ 14:26) (93/63 - 108/81)  RR: 19 (10-19-23 @ 14:26) (16 - 19)  SpO2: 95% (10-19-23 @ 14:26) (90% - 96%)    Weight in k.1 (10-19 @ 05:45)    I&O's Summary    18 Oct 2023 07:  -  19 Oct 2023 07:00  --------------------------------------------------------  IN: 900 mL / OUT: 0 mL / NET: 900 mL    19 Oct 2023 07:01  -  19 Oct 2023 14:48  --------------------------------------------------------  IN: 480 mL / OUT: 0 mL / NET: 480 mL        Physical Exam  General: No distress. Comfortable.  Neck: Neck supple. JVP not elevated. No masses  Chest: Clear to auscultation bilaterally  CV: Normal S1 and S2.   Abdomen: Soft, non-distended, non-tender  Extremities: warm and perfused  Neurology: Alert and oriented times three    Labs:                        10.9   8.11  )-----------( 241      ( 19 Oct 2023 06:22 )             34.5     10-19    138  |  104  |  29<H>  ----------------------------<  124<H>  3.6   |  21<L>  |  1.60<H>    Ca    8.6      19 Oct 2023 06:22      PT/INR - ( 18 Oct 2023 06:11 )   PT: 13.1 sec;   INR: 1.20 ratio         PTT - ( 19 Oct 2023 06:22 )  PTT:75.8 sec                  Imaging Studies

## 2023-10-19 NOTE — PROGRESS NOTE ADULT - SUBJECTIVE AND OBJECTIVE BOX
Cardiology Progress Note  ------------------------------------------------------------------------------------------    SUBJECTIVE/EVENTS::  - Tele: A-sensed, V paced, tachycardia  - NAEO    -------------------------------------------------------------------------------------------  ROS:  CV: chest pain (-), palpitation (-), orthopnea (-), PND (-), edema (-)  PULM: SOB (-), cough (-), wheezing (-), hemoptysis (-).   CONST: fever (-), chills (-) or fatigue (-)  GI: abdominal distension (-), abdominal pain (-) , nausea/vomiting (-), hematemesis, (-), melena (-), hematochezia (-)  : dysuria (-), frequency (-), hematuria (-).   NEURO: numbness (-), weakness (-), dizziness (-)  MSK: myalgia (-), joint pain (-)   SKIN: itching (-), rash (-)  HEENT:  visual changes (-); vertigo or throat pain (-);  neck stiffness (-)   Psych: change in mood (-), anxiety (-), depression (-)     All other review of systems is negative unless indicated above.   -------------------------------------------------------------------------------------------  VS:  T(F): 98.6 (10-19), Max: 98.9 (10-18)  HR: 125 (10-19) (115 - 126)  BP: 95/66 (10-) (95/63 - 107/73)  RR: 18 (10-19)  SpO2: 90% (10-19)  I&O's Summary    17 Oct 2023 07:  -  18 Oct 2023 07:00  --------------------------------------------------------  IN: 660 mL / OUT: 0 mL / NET: 660 mL    18 Oct 2023 07:01  -  19 Oct 2023 05:54  --------------------------------------------------------  IN: 900 mL / OUT: 0 mL / NET: 900 mL      ------------------------------------------------------------------------------------------  PHYSICAL EXAM:  GEN: NAD, sitting in bed  HEENT: NCAT, EOMI  CV: tachycardic, rr, Ns1/s2, no m/r/g, JVP not elevated  RESP: CTA B/l, no w/r/r  ABD: soft, nt, nd  Ext: warm, 2+ pulses, no edema  Neuro: AAOx3, no focal deficits  Psych: Calm, cooperative    -------------------------------------------------------------------------------------------  LABS:                          10.9   8.61  )-----------( 303      ( 17 Oct 2023 18:49 )             35.7     10-17    138  |  107  |  32<H>  ----------------------------<  183<H>  3.8   |  20<L>  |  1.54<H>    Ca    8.8      17 Oct 2023 07:58    TPro  5.9<L>  /  Alb  3.0<L>  /  TBili  0.3  /  DBili  x   /  AST  15  /  ALT  25  /  AlkPhos  51  1017    PT/INR - ( 18 Oct 2023 06:11 )   PT: 13.1 sec;   INR: 1.20 ratio         PTT - ( 18 Oct 2023 06:11 )  PTT:31.2 sec  CARDIAC MARKERS ( 14 Oct 2023 06:18 )  752 ng/L / x     / x     / 202 U/L / x     / 25.5 ng/mL      Total Cholesterol: 167  LDL: --  HDL: 52  T        -------------------------------------------------------------------------------------------  Meds:  acetaminophen     Tablet .. 650 milliGRAM(s) Oral every 6 hours PRN  albuterol    90 MICROgram(s) HFA Inhaler 2 Puff(s) Inhalation every 6 hours PRN  aspirin enteric coated 81 milliGRAM(s) Oral daily  atorvastatin 80 milliGRAM(s) Oral at bedtime  buMETAnide 1 milliGRAM(s) Oral daily  dextrose 5%. 1000 milliLiter(s) IV Continuous <Continuous>  dextrose 5%. 1000 milliLiter(s) IV Continuous <Continuous>  dextrose 50% Injectable 12.5 Gram(s) IV Push once  dextrose 50% Injectable 25 Gram(s) IV Push once  dextrose 50% Injectable 25 Gram(s) IV Push once  dextrose Oral Gel 15 Gram(s) Oral once PRN  glucagon  Injectable 1 milliGRAM(s) IntraMuscular once  guaiFENesin Oral Liquid (Sugar-Free) 100 milliGRAM(s) Oral every 6 hours PRN  heparin   Injectable 5500 Unit(s) IV Push every 6 hours PRN  heparin   Injectable 2500 Unit(s) IV Push every 6 hours PRN  heparin  Infusion.  Unit(s)/Hr IV Continuous <Continuous>  hydrALAZINE 25 milliGRAM(s) Oral every 8 hours  insulin glargine Injectable (LANTUS) 30 Unit(s) SubCutaneous at bedtime  insulin lispro (ADMELOG) corrective regimen sliding scale   SubCutaneous three times a day before meals  insulin lispro (ADMELOG) corrective regimen sliding scale   SubCutaneous at bedtime  insulin lispro Injectable (ADMELOG) 10 Unit(s) SubCutaneous three times a day before meals  metoprolol succinate ER 25 milliGRAM(s) Oral daily  pantoprazole    Tablet 40 milliGRAM(s) Oral before breakfast  tamsulosin 0.4 milliGRAM(s) Oral at bedtime    -------------------------------------------------------------------------------------------  Cardiovascular Diagnostic Testing:  CMR  FINDINGS:    The artifact caused by the left biventricular ICD causes significant   artifact and degrades the quality of the images.    The left ventricle is qualitatively dilated with decreased systolic   function.  The right ventricle is qualitatively normal in size with normal systolic   function.  The left atrium is dilated. There is qualitatively mitral regurgitation.  Small pericardial effusion.    REGIONAL FUNCTION:    Akinesia of the anterior and septal segments at the mid-level. The other   segments are hypokinetic.    T2 SEQUENCE:    No abnormal T2 signal to demonstrate myocardial inflammation or edema.    PERFUSION:    Subendocardial perfusion defects within the septum and anterior wall.    LATE GADOLINIUM ENHANCEMENT (LGE):    On delayed contrast enhanced images, there is greater than 50% thickness   subendocardial LGE within the mid anteroseptum, anterior and   anterolateral segments and apical segments.    QUANTITATIVE ANALYSIS:    BSA: 1.9 m2    LV MEASUREMENTS:    LVEF: 13% (normal: male = 56-78%; female = 56-78%)  LVEDV: 225 ml (normal: male =  ml; female =  ml)  LVESV: 197 ml (normal: male = 19-72 ml; female = 13-51 ml)  SV: 28 ml (normal: male =  ml; female = 33-97)  EDV/BSA: 118 ml/m2 (normal: male= 47-92 ml/m2; female= 41-81 ml/m2)      ADDITIONAL FINDINGS:    Bilateral pleural effusions. Please note this examination is focused on   the heart.    IMPRESSION:    Limited exam secondary to artifact caused by the ICD.    There is greater than 50% thickness subendocardial LGE within the mid   anteroseptum, anterior and anterolateral segments and apical segments.      -------------------------------------------------------------------------------------------  Assessment and Plan:   57 yo Male pmhx CVA with mild left sided deficits, HTN, DM2, vertigo, HLD, Mobitz II s/p Medtronic dual chamber ICD, newly diagnosed HFrEF (EF 20%) who presents as a transfer from [facility] after admission for ADHF/PNA now transferred for ischemic eval in setting of newly depressed EF with noted WMA. 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    1. HFrEF (20%) - new finding at OSH. Started on bumex, and metop. Remainder of GDMT pending improvement in renal fx.  2. NSTEMI - trop peaked at OSH 25,000. Started on ASA/Plavix and statin. [facility] w/ severe 3v disease, now being evaluated for CABG. cMRI showing akineses of the anterior and septal segments at the mid level w/ hypokinesis elsewhere, >50% subendocardial LGE within the med anteroseptum, anterior, and anterioal lates segments and apical segments.   3. High degree block s/p dual chamber ICD - noted to be in A sensed V sensed tachycardic rhythm, in the 100-120s.   4. Possible LV thrombus seen on TTE: CMR w/o note of LV thrombus. Can stop AC    Recommendations:  - can likely stop AC given no LV thrombus seen  - Continue ASA 81, atorva 80mg  - Continue metop succinate 25mg  - continue Bumex 1mg daily  - continue hydral  - will start ARNI and MRA pending cardiac MRI, renal function tests  - appreciate CTS evaluation  - apprecaite HF recs Cardiology Progress Note  ------------------------------------------------------------------------------------------    SUBJECTIVE/EVENTS::  - Tele: A-sensed, V paced, tachycardia  - NAEO    -------------------------------------------------------------------------------------------  ROS:  CV: chest pain (-), palpitation (-), orthopnea (-), PND (-), edema (-)  PULM: SOB (-), cough (-), wheezing (-), hemoptysis (-).   CONST: fever (-), chills (-) or fatigue (-)  GI: abdominal distension (-), abdominal pain (-) , nausea/vomiting (-), hematemesis, (-), melena (-), hematochezia (-)  : dysuria (-), frequency (-), hematuria (-).   NEURO: numbness (-), weakness (-), dizziness (-)  MSK: myalgia (-), joint pain (-)   SKIN: itching (-), rash (-)  HEENT:  visual changes (-); vertigo or throat pain (-);  neck stiffness (-)   Psych: change in mood (-), anxiety (-), depression (-)     All other review of systems is negative unless indicated above.   -------------------------------------------------------------------------------------------  VS:  T(F): 98.6 (10-19), Max: 98.9 (10-18)  HR: 125 (10-19) (115 - 126)  BP: 95/66 (10-) (95/63 - 107/73)  RR: 18 (10-19)  SpO2: 90% (10-19)  I&O's Summary    17 Oct 2023 07:  -  18 Oct 2023 07:00  --------------------------------------------------------  IN: 660 mL / OUT: 0 mL / NET: 660 mL    18 Oct 2023 07:01  -  19 Oct 2023 05:54  --------------------------------------------------------  IN: 900 mL / OUT: 0 mL / NET: 900 mL      ------------------------------------------------------------------------------------------  PHYSICAL EXAM:  GEN: NAD, sitting in bed  HEENT: NCAT, EOMI  CV: tachycardic, rr, Ns1/s2, no m/r/g, JVP not elevated  RESP: CTA B/l, no w/r/r  ABD: soft, nt, nd  Ext: warm, 2+ pulses, no edema  Neuro: AAOx3, no focal deficits  Psych: Calm, cooperative    -------------------------------------------------------------------------------------------  LABS:                          10.9   8.61  )-----------( 303      ( 17 Oct 2023 18:49 )             35.7     10-17    138  |  107  |  32<H>  ----------------------------<  183<H>  3.8   |  20<L>  |  1.54<H>    Ca    8.8      17 Oct 2023 07:58    TPro  5.9<L>  /  Alb  3.0<L>  /  TBili  0.3  /  DBili  x   /  AST  15  /  ALT  25  /  AlkPhos  51  1017    PT/INR - ( 18 Oct 2023 06:11 )   PT: 13.1 sec;   INR: 1.20 ratio         PTT - ( 18 Oct 2023 06:11 )  PTT:31.2 sec  CARDIAC MARKERS ( 14 Oct 2023 06:18 )  752 ng/L / x     / x     / 202 U/L / x     / 25.5 ng/mL      Total Cholesterol: 167  LDL: --  HDL: 52  T        -------------------------------------------------------------------------------------------  Meds:  acetaminophen     Tablet .. 650 milliGRAM(s) Oral every 6 hours PRN  albuterol    90 MICROgram(s) HFA Inhaler 2 Puff(s) Inhalation every 6 hours PRN  aspirin enteric coated 81 milliGRAM(s) Oral daily  atorvastatin 80 milliGRAM(s) Oral at bedtime  buMETAnide 1 milliGRAM(s) Oral daily  dextrose 5%. 1000 milliLiter(s) IV Continuous <Continuous>  dextrose 5%. 1000 milliLiter(s) IV Continuous <Continuous>  dextrose 50% Injectable 12.5 Gram(s) IV Push once  dextrose 50% Injectable 25 Gram(s) IV Push once  dextrose 50% Injectable 25 Gram(s) IV Push once  dextrose Oral Gel 15 Gram(s) Oral once PRN  glucagon  Injectable 1 milliGRAM(s) IntraMuscular once  guaiFENesin Oral Liquid (Sugar-Free) 100 milliGRAM(s) Oral every 6 hours PRN  heparin   Injectable 5500 Unit(s) IV Push every 6 hours PRN  heparin   Injectable 2500 Unit(s) IV Push every 6 hours PRN  heparin  Infusion.  Unit(s)/Hr IV Continuous <Continuous>  hydrALAZINE 25 milliGRAM(s) Oral every 8 hours  insulin glargine Injectable (LANTUS) 30 Unit(s) SubCutaneous at bedtime  insulin lispro (ADMELOG) corrective regimen sliding scale   SubCutaneous three times a day before meals  insulin lispro (ADMELOG) corrective regimen sliding scale   SubCutaneous at bedtime  insulin lispro Injectable (ADMELOG) 10 Unit(s) SubCutaneous three times a day before meals  metoprolol succinate ER 25 milliGRAM(s) Oral daily  pantoprazole    Tablet 40 milliGRAM(s) Oral before breakfast  tamsulosin 0.4 milliGRAM(s) Oral at bedtime    -------------------------------------------------------------------------------------------  Cardiovascular Diagnostic Testing:  CMR  FINDINGS:    The artifact caused by the left biventricular ICD causes significant   artifact and degrades the quality of the images.    The left ventricle is qualitatively dilated with decreased systolic   function.  The right ventricle is qualitatively normal in size with normal systolic   function.  The left atrium is dilated. There is qualitatively mitral regurgitation.  Small pericardial effusion.    REGIONAL FUNCTION:    Akinesia of the anterior and septal segments at the mid-level. The other   segments are hypokinetic.    T2 SEQUENCE:    No abnormal T2 signal to demonstrate myocardial inflammation or edema.    PERFUSION:    Subendocardial perfusion defects within the septum and anterior wall.    LATE GADOLINIUM ENHANCEMENT (LGE):    On delayed contrast enhanced images, there is greater than 50% thickness   subendocardial LGE within the mid anteroseptum, anterior and   anterolateral segments and apical segments.    QUANTITATIVE ANALYSIS:    BSA: 1.9 m2    LV MEASUREMENTS:    LVEF: 13% (normal: male = 56-78%; female = 56-78%)  LVEDV: 225 ml (normal: male =  ml; female =  ml)  LVESV: 197 ml (normal: male = 19-72 ml; female = 13-51 ml)  SV: 28 ml (normal: male =  ml; female = 33-97)  EDV/BSA: 118 ml/m2 (normal: male= 47-92 ml/m2; female= 41-81 ml/m2)      ADDITIONAL FINDINGS:    Bilateral pleural effusions. Please note this examination is focused on   the heart.    IMPRESSION:    Limited exam secondary to artifact caused by the ICD.    There is greater than 50% thickness subendocardial LGE within the mid   anteroseptum, anterior and anterolateral segments and apical segments.      -------------------------------------------------------------------------------------------  Assessment and Plan:   57 yo Male pmhx CVA with mild left sided deficits, HTN, DM2, vertigo, HLD, Mobitz II s/p Medtronic dual chamber ICD, newly diagnosed HFrEF (EF 20%) who presents as a transfer from [facility] after admission for ADHF/PNA now transferred for ischemic eval in setting of newly depressed EF with noted WMA. [facility] w/ severe 3v disease.    1. HFrEF (20%) - new finding at OSH. Started on bumex, and metop. Remainder of GDMT pending improvement in renal fx.  2. NSTEMI - trop peaked at OSH 25,000. Started on ASA/Plavix and statin. [facility] w/ severe 3v disease, now being evaluated for CABG. cMRI showing akineses of the anterior and septal segments at the mid level w/ hypokinesis elsewhere, >50% subendocardial LGE within the med anteroseptum, anterior, and anterioal lates segments and apical segments.   3. High degree block s/p dual chamber ICD - noted to be in A sensed V sensed tachycardic rhythm, in the 100-120s.   4. Possible LV thrombus seen on TTE: CMR w/o note of LV thrombus. Can stop AC    Recommendations:  - continue AC per CTS  - Continue ASA 81, atorva 80mg  - Continue metop succinate 25mg  - continue Bumex 1mg daily  - continue hydral  - will start ARNI and MRA pending cardiac MRI, renal function tests  - appreciate CTS evaluation  - appreciate HF recs

## 2023-10-19 NOTE — PROGRESS NOTE ADULT - PROBLEM SELECTOR PLAN 2
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

## 2023-10-20 LAB
ALBUMIN SERPL ELPH-MCNC: 3.3 G/DL — SIGNIFICANT CHANGE UP (ref 3.3–5)
ALBUMIN SERPL ELPH-MCNC: 3.3 G/DL — SIGNIFICANT CHANGE UP (ref 3.3–5)
ALBUMIN SERPL ELPH-MCNC: 3.4 G/DL — SIGNIFICANT CHANGE UP (ref 3.3–5)
ALBUMIN SERPL ELPH-MCNC: 3.4 G/DL — SIGNIFICANT CHANGE UP (ref 3.3–5)
ALP SERPL-CCNC: 77 U/L — SIGNIFICANT CHANGE UP (ref 40–120)
ALP SERPL-CCNC: 77 U/L — SIGNIFICANT CHANGE UP (ref 40–120)
ALP SERPL-CCNC: 78 U/L — SIGNIFICANT CHANGE UP (ref 40–120)
ALP SERPL-CCNC: 78 U/L — SIGNIFICANT CHANGE UP (ref 40–120)
ALT FLD-CCNC: 34 U/L — SIGNIFICANT CHANGE UP (ref 10–45)
ALT FLD-CCNC: 34 U/L — SIGNIFICANT CHANGE UP (ref 10–45)
ALT FLD-CCNC: 35 U/L — SIGNIFICANT CHANGE UP (ref 10–45)
ALT FLD-CCNC: 35 U/L — SIGNIFICANT CHANGE UP (ref 10–45)
ANION GAP SERPL CALC-SCNC: 16 MMOL/L — SIGNIFICANT CHANGE UP (ref 5–17)
ANION GAP SERPL CALC-SCNC: 16 MMOL/L — SIGNIFICANT CHANGE UP (ref 5–17)
ANION GAP SERPL CALC-SCNC: 18 MMOL/L — HIGH (ref 5–17)
ANION GAP SERPL CALC-SCNC: 18 MMOL/L — HIGH (ref 5–17)
ANION GAP SERPL CALC-SCNC: 19 MMOL/L — HIGH (ref 5–17)
ANION GAP SERPL CALC-SCNC: 19 MMOL/L — HIGH (ref 5–17)
AST SERPL-CCNC: 46 U/L — HIGH (ref 10–40)
AST SERPL-CCNC: 46 U/L — HIGH (ref 10–40)
AST SERPL-CCNC: 50 U/L — HIGH (ref 10–40)
AST SERPL-CCNC: 50 U/L — HIGH (ref 10–40)
BASE EXCESS BLDV CALC-SCNC: -6.2 MMOL/L — LOW (ref -2–3)
BASE EXCESS BLDV CALC-SCNC: -6.2 MMOL/L — LOW (ref -2–3)
BASE EXCESS BLDV CALC-SCNC: -6.8 MMOL/L — LOW (ref -2–3)
BASE EXCESS BLDV CALC-SCNC: -6.8 MMOL/L — LOW (ref -2–3)
BILIRUB DIRECT SERPL-MCNC: 0.1 MG/DL — SIGNIFICANT CHANGE UP (ref 0–0.3)
BILIRUB DIRECT SERPL-MCNC: 0.1 MG/DL — SIGNIFICANT CHANGE UP (ref 0–0.3)
BILIRUB INDIRECT FLD-MCNC: 0.3 MG/DL — SIGNIFICANT CHANGE UP (ref 0.2–1)
BILIRUB INDIRECT FLD-MCNC: 0.3 MG/DL — SIGNIFICANT CHANGE UP (ref 0.2–1)
BILIRUB SERPL-MCNC: 0.3 MG/DL — SIGNIFICANT CHANGE UP (ref 0.2–1.2)
BILIRUB SERPL-MCNC: 0.3 MG/DL — SIGNIFICANT CHANGE UP (ref 0.2–1.2)
BILIRUB SERPL-MCNC: 0.4 MG/DL — SIGNIFICANT CHANGE UP (ref 0.2–1.2)
BILIRUB SERPL-MCNC: 0.4 MG/DL — SIGNIFICANT CHANGE UP (ref 0.2–1.2)
BUN SERPL-MCNC: 26 MG/DL — HIGH (ref 7–23)
BUN SERPL-MCNC: 26 MG/DL — HIGH (ref 7–23)
BUN SERPL-MCNC: 31 MG/DL — HIGH (ref 7–23)
BUN SERPL-MCNC: 31 MG/DL — HIGH (ref 7–23)
BUN SERPL-MCNC: 36 MG/DL — HIGH (ref 7–23)
BUN SERPL-MCNC: 36 MG/DL — HIGH (ref 7–23)
CA-I SERPL-SCNC: 1.2 MMOL/L — SIGNIFICANT CHANGE UP (ref 1.15–1.33)
CALCIUM SERPL-MCNC: 8.9 MG/DL — SIGNIFICANT CHANGE UP (ref 8.4–10.5)
CALCIUM SERPL-MCNC: 8.9 MG/DL — SIGNIFICANT CHANGE UP (ref 8.4–10.5)
CALCIUM SERPL-MCNC: 9 MG/DL — SIGNIFICANT CHANGE UP (ref 8.4–10.5)
CALCIUM SERPL-MCNC: 9 MG/DL — SIGNIFICANT CHANGE UP (ref 8.4–10.5)
CALCIUM SERPL-MCNC: 9.1 MG/DL — SIGNIFICANT CHANGE UP (ref 8.4–10.5)
CALCIUM SERPL-MCNC: 9.1 MG/DL — SIGNIFICANT CHANGE UP (ref 8.4–10.5)
CHLORIDE BLDV-SCNC: 101 MMOL/L — SIGNIFICANT CHANGE UP (ref 96–108)
CHLORIDE BLDV-SCNC: 101 MMOL/L — SIGNIFICANT CHANGE UP (ref 96–108)
CHLORIDE BLDV-SCNC: 102 MMOL/L — SIGNIFICANT CHANGE UP (ref 96–108)
CHLORIDE BLDV-SCNC: 102 MMOL/L — SIGNIFICANT CHANGE UP (ref 96–108)
CHLORIDE SERPL-SCNC: 100 MMOL/L — SIGNIFICANT CHANGE UP (ref 96–108)
CHLORIDE SERPL-SCNC: 100 MMOL/L — SIGNIFICANT CHANGE UP (ref 96–108)
CHLORIDE SERPL-SCNC: 101 MMOL/L — SIGNIFICANT CHANGE UP (ref 96–108)
CHLORIDE SERPL-SCNC: 101 MMOL/L — SIGNIFICANT CHANGE UP (ref 96–108)
CHLORIDE SERPL-SCNC: 99 MMOL/L — SIGNIFICANT CHANGE UP (ref 96–108)
CHLORIDE SERPL-SCNC: 99 MMOL/L — SIGNIFICANT CHANGE UP (ref 96–108)
CO2 BLDV-SCNC: 18 MMOL/L — LOW (ref 22–26)
CO2 SERPL-SCNC: 15 MMOL/L — LOW (ref 22–31)
CO2 SERPL-SCNC: 16 MMOL/L — LOW (ref 22–31)
CO2 SERPL-SCNC: 16 MMOL/L — LOW (ref 22–31)
CREAT SERPL-MCNC: 1.48 MG/DL — HIGH (ref 0.5–1.3)
CREAT SERPL-MCNC: 1.48 MG/DL — HIGH (ref 0.5–1.3)
CREAT SERPL-MCNC: 1.66 MG/DL — HIGH (ref 0.5–1.3)
CREAT SERPL-MCNC: 1.66 MG/DL — HIGH (ref 0.5–1.3)
CREAT SERPL-MCNC: 1.77 MG/DL — HIGH (ref 0.5–1.3)
CREAT SERPL-MCNC: 1.77 MG/DL — HIGH (ref 0.5–1.3)
EGFR: 45 ML/MIN/1.73M2 — LOW
EGFR: 45 ML/MIN/1.73M2 — LOW
EGFR: 48 ML/MIN/1.73M2 — LOW
EGFR: 48 ML/MIN/1.73M2 — LOW
EGFR: 55 ML/MIN/1.73M2 — LOW
EGFR: 55 ML/MIN/1.73M2 — LOW
GAS PNL BLDA: SIGNIFICANT CHANGE UP
GAS PNL BLDA: SIGNIFICANT CHANGE UP
GAS PNL BLDV: 128 MMOL/L — LOW (ref 136–145)
GAS PNL BLDV: 128 MMOL/L — LOW (ref 136–145)
GAS PNL BLDV: 129 MMOL/L — LOW (ref 136–145)
GAS PNL BLDV: 129 MMOL/L — LOW (ref 136–145)
GAS PNL BLDV: SIGNIFICANT CHANGE UP
GLUCOSE BLDC GLUCOMTR-MCNC: 150 MG/DL — HIGH (ref 70–99)
GLUCOSE BLDC GLUCOMTR-MCNC: 150 MG/DL — HIGH (ref 70–99)
GLUCOSE BLDC GLUCOMTR-MCNC: 153 MG/DL — HIGH (ref 70–99)
GLUCOSE BLDC GLUCOMTR-MCNC: 153 MG/DL — HIGH (ref 70–99)
GLUCOSE BLDC GLUCOMTR-MCNC: 157 MG/DL — HIGH (ref 70–99)
GLUCOSE BLDC GLUCOMTR-MCNC: 157 MG/DL — HIGH (ref 70–99)
GLUCOSE BLDC GLUCOMTR-MCNC: 173 MG/DL — HIGH (ref 70–99)
GLUCOSE BLDC GLUCOMTR-MCNC: 173 MG/DL — HIGH (ref 70–99)
GLUCOSE BLDC GLUCOMTR-MCNC: 187 MG/DL — HIGH (ref 70–99)
GLUCOSE BLDC GLUCOMTR-MCNC: 187 MG/DL — HIGH (ref 70–99)
GLUCOSE BLDC GLUCOMTR-MCNC: 254 MG/DL — HIGH (ref 70–99)
GLUCOSE BLDC GLUCOMTR-MCNC: 254 MG/DL — HIGH (ref 70–99)
GLUCOSE BLDV-MCNC: 147 MG/DL — HIGH (ref 70–99)
GLUCOSE BLDV-MCNC: 147 MG/DL — HIGH (ref 70–99)
GLUCOSE BLDV-MCNC: 198 MG/DL — HIGH (ref 70–99)
GLUCOSE BLDV-MCNC: 198 MG/DL — HIGH (ref 70–99)
GLUCOSE SERPL-MCNC: 165 MG/DL — HIGH (ref 70–99)
GLUCOSE SERPL-MCNC: 165 MG/DL — HIGH (ref 70–99)
GLUCOSE SERPL-MCNC: 171 MG/DL — HIGH (ref 70–99)
GLUCOSE SERPL-MCNC: 171 MG/DL — HIGH (ref 70–99)
GLUCOSE SERPL-MCNC: 193 MG/DL — HIGH (ref 70–99)
GLUCOSE SERPL-MCNC: 193 MG/DL — HIGH (ref 70–99)
HCO3 BLDV-SCNC: 17 MMOL/L — LOW (ref 22–29)
HCO3 BLDV-SCNC: 17 MMOL/L — LOW (ref 22–29)
HCO3 BLDV-SCNC: 18 MMOL/L — LOW (ref 22–29)
HCO3 BLDV-SCNC: 18 MMOL/L — LOW (ref 22–29)
HCT VFR BLD CALC: 35.8 % — LOW (ref 39–50)
HCT VFR BLD CALC: 35.8 % — LOW (ref 39–50)
HCT VFR BLD CALC: 36 % — LOW (ref 39–50)
HCT VFR BLD CALC: 36 % — LOW (ref 39–50)
HCT VFR BLD CALC: 36.4 % — LOW (ref 39–50)
HCT VFR BLD CALC: 36.4 % — LOW (ref 39–50)
HCT VFR BLDA CALC: 36 % — LOW (ref 39–51)
HGB BLD CALC-MCNC: 12 G/DL — LOW (ref 12.6–17.4)
HGB BLD CALC-MCNC: 12 G/DL — LOW (ref 12.6–17.4)
HGB BLD CALC-MCNC: 12.1 G/DL — LOW (ref 12.6–17.4)
HGB BLD CALC-MCNC: 12.1 G/DL — LOW (ref 12.6–17.4)
HGB BLD-MCNC: 11.3 G/DL — LOW (ref 13–17)
HGB BLD-MCNC: 11.3 G/DL — LOW (ref 13–17)
HGB BLD-MCNC: 11.6 G/DL — LOW (ref 13–17)
HGB BLD-MCNC: 11.6 G/DL — LOW (ref 13–17)
HGB BLD-MCNC: 11.7 G/DL — LOW (ref 13–17)
HGB BLD-MCNC: 11.7 G/DL — LOW (ref 13–17)
LACTATE BLDV-MCNC: 1.9 MMOL/L — SIGNIFICANT CHANGE UP (ref 0.5–2)
LACTATE BLDV-MCNC: 1.9 MMOL/L — SIGNIFICANT CHANGE UP (ref 0.5–2)
LACTATE BLDV-MCNC: 2.9 MMOL/L — HIGH (ref 0.5–2)
LACTATE BLDV-MCNC: 2.9 MMOL/L — HIGH (ref 0.5–2)
LACTATE SERPL-SCNC: 3.5 MMOL/L — HIGH (ref 0.5–2)
LACTATE SERPL-SCNC: 3.5 MMOL/L — HIGH (ref 0.5–2)
MAGNESIUM SERPL-MCNC: 2 MG/DL — SIGNIFICANT CHANGE UP (ref 1.6–2.6)
MAGNESIUM SERPL-MCNC: 2 MG/DL — SIGNIFICANT CHANGE UP (ref 1.6–2.6)
MCHC RBC-ENTMCNC: 25.2 PG — LOW (ref 27–34)
MCHC RBC-ENTMCNC: 25.2 PG — LOW (ref 27–34)
MCHC RBC-ENTMCNC: 25.7 PG — LOW (ref 27–34)
MCHC RBC-ENTMCNC: 25.7 PG — LOW (ref 27–34)
MCHC RBC-ENTMCNC: 25.8 PG — LOW (ref 27–34)
MCHC RBC-ENTMCNC: 25.8 PG — LOW (ref 27–34)
MCHC RBC-ENTMCNC: 31 GM/DL — LOW (ref 32–36)
MCHC RBC-ENTMCNC: 31 GM/DL — LOW (ref 32–36)
MCHC RBC-ENTMCNC: 32.2 GM/DL — SIGNIFICANT CHANGE UP (ref 32–36)
MCHC RBC-ENTMCNC: 32.2 GM/DL — SIGNIFICANT CHANGE UP (ref 32–36)
MCHC RBC-ENTMCNC: 32.7 GM/DL — SIGNIFICANT CHANGE UP (ref 32–36)
MCHC RBC-ENTMCNC: 32.7 GM/DL — SIGNIFICANT CHANGE UP (ref 32–36)
MCV RBC AUTO: 79 FL — LOW (ref 80–100)
MCV RBC AUTO: 79 FL — LOW (ref 80–100)
MCV RBC AUTO: 79.6 FL — LOW (ref 80–100)
MCV RBC AUTO: 79.6 FL — LOW (ref 80–100)
MCV RBC AUTO: 81.1 FL — SIGNIFICANT CHANGE UP (ref 80–100)
MCV RBC AUTO: 81.1 FL — SIGNIFICANT CHANGE UP (ref 80–100)
NRBC # BLD: 0 /100 WBCS — SIGNIFICANT CHANGE UP (ref 0–0)
PCO2 BLDV: 28 MMHG — LOW (ref 42–55)
PCO2 BLDV: 28 MMHG — LOW (ref 42–55)
PCO2 BLDV: 29 MMHG — LOW (ref 42–55)
PCO2 BLDV: 29 MMHG — LOW (ref 42–55)
PH BLDV: 7.39 — SIGNIFICANT CHANGE UP (ref 7.32–7.43)
PHOSPHATE SERPL-MCNC: 5.5 MG/DL — HIGH (ref 2.5–4.5)
PHOSPHATE SERPL-MCNC: 5.5 MG/DL — HIGH (ref 2.5–4.5)
PLATELET # BLD AUTO: 266 K/UL — SIGNIFICANT CHANGE UP (ref 150–400)
PLATELET # BLD AUTO: 266 K/UL — SIGNIFICANT CHANGE UP (ref 150–400)
PLATELET # BLD AUTO: 277 K/UL — SIGNIFICANT CHANGE UP (ref 150–400)
PLATELET # BLD AUTO: 277 K/UL — SIGNIFICANT CHANGE UP (ref 150–400)
PLATELET # BLD AUTO: 305 K/UL — SIGNIFICANT CHANGE UP (ref 150–400)
PLATELET # BLD AUTO: 305 K/UL — SIGNIFICANT CHANGE UP (ref 150–400)
PO2 BLDV: 60 MMHG — HIGH (ref 25–45)
PO2 BLDV: 60 MMHG — HIGH (ref 25–45)
PO2 BLDV: 70 MMHG — HIGH (ref 25–45)
PO2 BLDV: 70 MMHG — HIGH (ref 25–45)
POTASSIUM BLDV-SCNC: 4.7 MMOL/L — SIGNIFICANT CHANGE UP (ref 3.5–5.1)
POTASSIUM SERPL-MCNC: 4.3 MMOL/L — SIGNIFICANT CHANGE UP (ref 3.5–5.3)
POTASSIUM SERPL-MCNC: 4.3 MMOL/L — SIGNIFICANT CHANGE UP (ref 3.5–5.3)
POTASSIUM SERPL-MCNC: 4.5 MMOL/L — SIGNIFICANT CHANGE UP (ref 3.5–5.3)
POTASSIUM SERPL-MCNC: 4.5 MMOL/L — SIGNIFICANT CHANGE UP (ref 3.5–5.3)
POTASSIUM SERPL-MCNC: 4.6 MMOL/L — SIGNIFICANT CHANGE UP (ref 3.5–5.3)
POTASSIUM SERPL-MCNC: 4.6 MMOL/L — SIGNIFICANT CHANGE UP (ref 3.5–5.3)
POTASSIUM SERPL-SCNC: 4.3 MMOL/L — SIGNIFICANT CHANGE UP (ref 3.5–5.3)
POTASSIUM SERPL-SCNC: 4.3 MMOL/L — SIGNIFICANT CHANGE UP (ref 3.5–5.3)
POTASSIUM SERPL-SCNC: 4.5 MMOL/L — SIGNIFICANT CHANGE UP (ref 3.5–5.3)
POTASSIUM SERPL-SCNC: 4.5 MMOL/L — SIGNIFICANT CHANGE UP (ref 3.5–5.3)
POTASSIUM SERPL-SCNC: 4.6 MMOL/L — SIGNIFICANT CHANGE UP (ref 3.5–5.3)
POTASSIUM SERPL-SCNC: 4.6 MMOL/L — SIGNIFICANT CHANGE UP (ref 3.5–5.3)
PROT SERPL-MCNC: 6.8 G/DL — SIGNIFICANT CHANGE UP (ref 6–8.3)
PROT SERPL-MCNC: 6.8 G/DL — SIGNIFICANT CHANGE UP (ref 6–8.3)
PROT SERPL-MCNC: 6.9 G/DL — SIGNIFICANT CHANGE UP (ref 6–8.3)
PROT SERPL-MCNC: 6.9 G/DL — SIGNIFICANT CHANGE UP (ref 6–8.3)
RAPID RVP RESULT: SIGNIFICANT CHANGE UP
RAPID RVP RESULT: SIGNIFICANT CHANGE UP
RBC # BLD: 4.49 M/UL — SIGNIFICANT CHANGE UP (ref 4.2–5.8)
RBC # BLD: 4.49 M/UL — SIGNIFICANT CHANGE UP (ref 4.2–5.8)
RBC # BLD: 4.52 M/UL — SIGNIFICANT CHANGE UP (ref 4.2–5.8)
RBC # BLD: 4.52 M/UL — SIGNIFICANT CHANGE UP (ref 4.2–5.8)
RBC # BLD: 4.53 M/UL — SIGNIFICANT CHANGE UP (ref 4.2–5.8)
RBC # BLD: 4.53 M/UL — SIGNIFICANT CHANGE UP (ref 4.2–5.8)
RBC # FLD: 15.3 % — HIGH (ref 10.3–14.5)
RBC # FLD: 15.3 % — HIGH (ref 10.3–14.5)
RBC # FLD: 15.4 % — HIGH (ref 10.3–14.5)
SAO2 % BLDV: 89.4 % — HIGH (ref 67–88)
SAO2 % BLDV: 89.4 % — HIGH (ref 67–88)
SAO2 % BLDV: 94.5 % — HIGH (ref 67–88)
SAO2 % BLDV: 94.5 % — HIGH (ref 67–88)
SARS-COV-2 RNA SPEC QL NAA+PROBE: SIGNIFICANT CHANGE UP
SARS-COV-2 RNA SPEC QL NAA+PROBE: SIGNIFICANT CHANGE UP
SODIUM SERPL-SCNC: 133 MMOL/L — LOW (ref 135–145)
WBC # BLD: 10.61 K/UL — HIGH (ref 3.8–10.5)
WBC # BLD: 10.61 K/UL — HIGH (ref 3.8–10.5)
WBC # BLD: 10.73 K/UL — HIGH (ref 3.8–10.5)
WBC # BLD: 10.73 K/UL — HIGH (ref 3.8–10.5)
WBC # BLD: 10.99 K/UL — HIGH (ref 3.8–10.5)
WBC # BLD: 10.99 K/UL — HIGH (ref 3.8–10.5)
WBC # FLD AUTO: 10.61 K/UL — HIGH (ref 3.8–10.5)
WBC # FLD AUTO: 10.61 K/UL — HIGH (ref 3.8–10.5)
WBC # FLD AUTO: 10.73 K/UL — HIGH (ref 3.8–10.5)
WBC # FLD AUTO: 10.73 K/UL — HIGH (ref 3.8–10.5)
WBC # FLD AUTO: 10.99 K/UL — HIGH (ref 3.8–10.5)
WBC # FLD AUTO: 10.99 K/UL — HIGH (ref 3.8–10.5)

## 2023-10-20 PROCEDURE — 93503 INSERT/PLACE HEART CATHETER: CPT

## 2023-10-20 PROCEDURE — 99291 CRITICAL CARE FIRST HOUR: CPT

## 2023-10-20 PROCEDURE — 99233 SBSQ HOSP IP/OBS HIGH 50: CPT

## 2023-10-20 PROCEDURE — 36556 INSERT NON-TUNNEL CV CATH: CPT | Mod: 59

## 2023-10-20 PROCEDURE — 99233 SBSQ HOSP IP/OBS HIGH 50: CPT | Mod: GC

## 2023-10-20 PROCEDURE — 71045 X-RAY EXAM CHEST 1 VIEW: CPT | Mod: 26

## 2023-10-20 RX ORDER — HYDRALAZINE HCL 50 MG
25 TABLET ORAL EVERY 8 HOURS
Refills: 0 | Status: DISCONTINUED | OUTPATIENT
Start: 2023-10-20 | End: 2023-10-22

## 2023-10-20 RX ORDER — DOBUTAMINE HCL 250MG/20ML
2.5 VIAL (ML) INTRAVENOUS
Qty: 500 | Refills: 0 | Status: DISCONTINUED | OUTPATIENT
Start: 2023-10-20 | End: 2023-10-21

## 2023-10-20 RX ORDER — SACUBITRIL AND VALSARTAN 24; 26 MG/1; MG/1
1 TABLET, FILM COATED ORAL
Refills: 0 | Status: DISCONTINUED | OUTPATIENT
Start: 2023-10-20 | End: 2023-10-20

## 2023-10-20 RX ORDER — BUMETANIDE 0.25 MG/ML
4 INJECTION INTRAMUSCULAR; INTRAVENOUS EVERY 12 HOURS
Refills: 0 | Status: DISCONTINUED | OUTPATIENT
Start: 2023-10-20 | End: 2023-10-20

## 2023-10-20 RX ORDER — BUMETANIDE 0.25 MG/ML
4 INJECTION INTRAMUSCULAR; INTRAVENOUS
Refills: 0 | Status: DISCONTINUED | OUTPATIENT
Start: 2023-10-20 | End: 2023-10-21

## 2023-10-20 RX ORDER — BUMETANIDE 0.25 MG/ML
8 INJECTION INTRAMUSCULAR; INTRAVENOUS ONCE
Refills: 0 | Status: COMPLETED | OUTPATIENT
Start: 2023-10-20 | End: 2023-10-20

## 2023-10-20 RX ORDER — METOPROLOL TARTRATE 50 MG
25 TABLET ORAL DAILY
Refills: 0 | Status: DISCONTINUED | OUTPATIENT
Start: 2023-10-20 | End: 2023-10-20

## 2023-10-20 RX ORDER — BENZOCAINE AND MENTHOL 5; 1 G/100ML; G/100ML
1 LIQUID ORAL
Refills: 0 | Status: DISCONTINUED | OUTPATIENT
Start: 2023-10-20 | End: 2023-11-08

## 2023-10-20 RX ORDER — BUMETANIDE 0.25 MG/ML
2 INJECTION INTRAMUSCULAR; INTRAVENOUS ONCE
Refills: 0 | Status: COMPLETED | OUTPATIENT
Start: 2023-10-20 | End: 2023-10-20

## 2023-10-20 RX ORDER — DIGOXIN 250 MCG
125 TABLET ORAL DAILY
Refills: 0 | Status: DISCONTINUED | OUTPATIENT
Start: 2023-10-20 | End: 2023-10-20

## 2023-10-20 RX ADMIN — BUMETANIDE 1 MILLIGRAM(S): 0.25 INJECTION INTRAMUSCULAR; INTRAVENOUS at 05:23

## 2023-10-20 RX ADMIN — ATORVASTATIN CALCIUM 80 MILLIGRAM(S): 80 TABLET, FILM COATED ORAL at 22:59

## 2023-10-20 RX ADMIN — Medication 50 MILLIGRAM(S): at 05:24

## 2023-10-20 RX ADMIN — INSULIN GLARGINE 30 UNIT(S): 100 INJECTION, SOLUTION SUBCUTANEOUS at 23:00

## 2023-10-20 RX ADMIN — Medication 10 UNIT(S): at 12:30

## 2023-10-20 RX ADMIN — Medication 5.25 MICROGRAM(S)/KG/MIN: at 20:06

## 2023-10-20 RX ADMIN — Medication 1: at 12:29

## 2023-10-20 RX ADMIN — BUMETANIDE 164 MILLIGRAM(S): 0.25 INJECTION INTRAMUSCULAR; INTRAVENOUS at 16:53

## 2023-10-20 RX ADMIN — Medication 25 MILLIGRAM(S): at 13:45

## 2023-10-20 RX ADMIN — HEPARIN SODIUM 5000 UNIT(S): 5000 INJECTION INTRAVENOUS; SUBCUTANEOUS at 05:24

## 2023-10-20 RX ADMIN — HEPARIN SODIUM 5000 UNIT(S): 5000 INJECTION INTRAVENOUS; SUBCUTANEOUS at 13:45

## 2023-10-20 RX ADMIN — ALBUTEROL 2 PUFF(S): 90 AEROSOL, METERED ORAL at 05:25

## 2023-10-20 RX ADMIN — Medication 3: at 08:41

## 2023-10-20 RX ADMIN — PANTOPRAZOLE SODIUM 40 MILLIGRAM(S): 20 TABLET, DELAYED RELEASE ORAL at 05:24

## 2023-10-20 RX ADMIN — HEPARIN SODIUM 5000 UNIT(S): 5000 INJECTION INTRAVENOUS; SUBCUTANEOUS at 22:59

## 2023-10-20 RX ADMIN — Medication 81 MILLIGRAM(S): at 12:22

## 2023-10-20 RX ADMIN — TAMSULOSIN HYDROCHLORIDE 0.4 MILLIGRAM(S): 0.4 CAPSULE ORAL at 22:59

## 2023-10-20 RX ADMIN — Medication 10 UNIT(S): at 08:42

## 2023-10-20 RX ADMIN — SENNA PLUS 2 TABLET(S): 8.6 TABLET ORAL at 23:00

## 2023-10-20 RX ADMIN — Medication 25 MILLIGRAM(S): at 05:24

## 2023-10-20 RX ADMIN — BUMETANIDE 2 MILLIGRAM(S): 0.25 INJECTION INTRAMUSCULAR; INTRAVENOUS at 12:21

## 2023-10-20 RX ADMIN — BUMETANIDE 132 MILLIGRAM(S): 0.25 INJECTION INTRAMUSCULAR; INTRAVENOUS at 13:45

## 2023-10-20 NOTE — PROGRESS NOTE ADULT - SUBJECTIVE AND OBJECTIVE BOX
Cardiology Progress Note  ------------------------------------------------------------------------------------------    SUBJECTIVE/EVENTS::  - Tele: A-sensed, V-paced Sinus tachycardia  - y/d RHC placed, RA 10, PCWP 28, CO 4.4, CI 2.3  - O/n noted to be slightly more tachycardic to the 130-140s, pt w/ mild palpitations, metop was increased    -------------------------------------------------------------------------------------------  ROS:  CV: chest pain (-), palpitation (-), orthopnea (-), PND (-), edema (-)  PULM: SOB (-), cough (-), wheezing (-), hemoptysis (-).   CONST: fever (-), chills (-) or fatigue (-)  GI: abdominal distension (-), abdominal pain (-) , nausea/vomiting (-), hematemesis, (-), melena (-), hematochezia (-)  : dysuria (-), frequency (-), hematuria (-).   NEURO: numbness (-), weakness (-), dizziness (-)  MSK: myalgia (-), joint pain (-)   SKIN: itching (-), rash (-)  HEENT:  visual changes (-); vertigo or throat pain (-);  neck stiffness (-)   Psych: change in mood (-), anxiety (-), depression (-)     All other review of systems is negative unless indicated above.   -------------------------------------------------------------------------------------------  VITALS:  T(F): 98 (10-20), Max: 98.4 (10-19)  HR: 123 (10-20) (118 - 147)  BP: 106/77 (10-20) (93/63 - 125/84)  RR: 20 (10-20)  SpO2: 95% (10-20)  I&O's Summary    18 Oct 2023 07:  -  19 Oct 2023 07:00  --------------------------------------------------------  IN: 900 mL / OUT: 0 mL / NET: 900 mL    19 Oct 2023 07:01  -  20 Oct 2023 06:14  --------------------------------------------------------  IN: 900 mL / OUT: 100 mL / NET: 800 mL      ------------------------------------------------------------------------------------------  PHYSICAL EXAM:  GEN: NAD, sitting in bed  HEENT: NCAT, EOMI  CV: RRR, Ns1/s2, no m/r/g, JVP not elevated  RESP: CTA B/l, no w/r/r  ABD: soft, nt, nd  EXT: warm, 2+ pulses, no edema  SKIN: no visible lesions, rashes  NEURO: AAOx3, no focal deficits  PSYCH: Calm, cooperative    -------------------------------------------------------------------------------------------  LABS:                          10.9   8.11  )-----------( 241      ( 19 Oct 2023 06:22 )             34.5     10    138  |  104  |  29<H>  ----------------------------<  124<H>  3.6   |  21<L>  |  1.60<H>    Ca    8.6      19 Oct 2023 06:22      PTT - ( 19 Oct 2023 06:22 )  PTT:75.8 sec  CARDIAC MARKERS ( 14 Oct 2023 06:18 )  752 ng/L / x     / x     / 202 U/L / x     / 25.5 ng/mL      Total Cholesterol: 167  LDL: --  HDL: 52  T        -------------------------------------------------------------------------------------------  Meds:  acetaminophen     Tablet .. 650 milliGRAM(s) Oral every 6 hours PRN  albuterol    90 MICROgram(s) HFA Inhaler 2 Puff(s) Inhalation every 6 hours PRN  aspirin enteric coated 81 milliGRAM(s) Oral daily  atorvastatin 80 milliGRAM(s) Oral at bedtime  buMETAnide 1 milliGRAM(s) Oral daily  dextrose 5%. 1000 milliLiter(s) IV Continuous <Continuous>  dextrose 5%. 1000 milliLiter(s) IV Continuous <Continuous>  dextrose 50% Injectable 25 Gram(s) IV Push once  dextrose 50% Injectable 12.5 Gram(s) IV Push once  dextrose 50% Injectable 25 Gram(s) IV Push once  dextrose Oral Gel 15 Gram(s) Oral once PRN  glucagon  Injectable 1 milliGRAM(s) IntraMuscular once  guaiFENesin Oral Liquid (Sugar-Free) 100 milliGRAM(s) Oral every 6 hours PRN  heparin   Injectable 5000 Unit(s) SubCutaneous every 8 hours  hydrALAZINE 25 milliGRAM(s) Oral every 8 hours  insulin glargine Injectable (LANTUS) 30 Unit(s) SubCutaneous at bedtime  insulin lispro (ADMELOG) corrective regimen sliding scale   SubCutaneous three times a day before meals  insulin lispro (ADMELOG) corrective regimen sliding scale   SubCutaneous at bedtime  insulin lispro Injectable (ADMELOG) 10 Unit(s) SubCutaneous three times a day before meals  metoprolol succinate ER 50 milliGRAM(s) Oral daily  pantoprazole    Tablet 40 milliGRAM(s) Oral before breakfast  polyethylene glycol 3350 17 Gram(s) Oral daily PRN  senna 2 Tablet(s) Oral at bedtime  tamsulosin 0.4 milliGRAM(s) Oral at bedtime    -------------------------------------------------------------------------------------------  Cardiovascular Diagnostic Testing:      -------------------------------------------------------------------------------------------  Assessment and Plan:   57 yo Male pmhx CVA with mild left sided deficits, HTN, DM2, vertigo, HLD, Mobitz II s/p Medtronic dual chamber ICD, newly diagnosed HFrEF (EF 20%) who presents as a transfer from [facility] after admission for ADHF/PNA now transferred for ischemic eval in setting of newly depressed EF with noted WMA. 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    1. HFrEF (20%) - new finding at OSH. Started on bumex, and metop. Remainder of GDMT pending improvement in renal fx. RHC placed on 10/19 showing RA 10, PCWP 29, CO 4.4, CI 2.3. HF team following  2. NSTEMI - trop peaked at OSH 25,000. Started on ASA/Plavix and statin. [facility] w/ severe 3v disease, now being evaluated for CABG. cMRI showing akineses of the anterior and septal segments at the mid level w/ hypokinesis elsewhere, >50% subendocardial LGE within the med anteroseptum, anterior, and anterioal lates segments and apical segments; on further review LAD territory appears non-viable.  3. High degree block s/p dual chamber ICD - noted to be in A sensed V sensed tachycardic rhythm, in the 100-120s.   4. Possible LV thrombus seen on TTE: CMR w/o note of LV thrombus. Can stop AC    Recommendations:  - continue AC per CTS  - Continue ASA 81, atorva 80mg  - Continue metop succinate 50mg  - continue Bumex 1mg daily  - continue hydral  - will start ARNI and MRA post revascularization  - appreciate CTS evaluation  - appreciate HF recs    *** Recommendations are preliminary until cosigned by the attending.    Colby Santos MD  Cardiology Fellow    For all New Consults and Questions:  [email or web address]   Login: [email or web address] Cardiology Progress Note  ------------------------------------------------------------------------------------------    SUBJECTIVE/EVENTS::  - Tele: A-sensed, V-paced Sinus tachycardia  - y/d RHC placed, RA 10, PCWP 28, CO 4.4, CI 2.3  - O/n noted to be slightly more tachycardic to the 130-140s, pt w/ mild palpitations, metop was increased    -------------------------------------------------------------------------------------------  ROS:  CV: chest pain (-), palpitation (+), orthopnea (-), PND (-), edema (-)  PULM: SOB (-), cough (-), wheezing (-), hemoptysis (-).   CONST: fever (-), chills (-) or fatigue (-)  GI: abdominal distension (-), abdominal pain (-) , nausea/vomiting (-), hematemesis, (-), melena (-), hematochezia (-)  : dysuria (-), frequency (-), hematuria (-).   NEURO: numbness (-), weakness (-), dizziness (-)  MSK: myalgia (-), joint pain (-)   SKIN: itching (-), rash (-)  HEENT:  visual changes (-); vertigo or throat pain (-);  neck stiffness (-)   Psych: change in mood (-), anxiety (-), depression (-)     All other review of systems is negative unless indicated above.   -------------------------------------------------------------------------------------------  VITALS:  T(F): 98 (10-20), Max: 98.4 (10-19)  HR: 123 (10-20) (118 - 147)  BP: 106/77 (10-20) (93/63 - 125/84)  RR: 20 (10-20)  SpO2: 95% (10-20)  I&O's Summary    18 Oct 2023 07:  -  19 Oct 2023 07:00  --------------------------------------------------------  IN: 900 mL / OUT: 0 mL / NET: 900 mL    19 Oct 2023 07:01  -  20 Oct 2023 06:14  --------------------------------------------------------  IN: 900 mL / OUT: 100 mL / NET: 800 mL      ------------------------------------------------------------------------------------------  PHYSICAL EXAM:  GEN: NAD, sitting in bed  HEENT: NCAT, EOMI  CV: RRR, Ns1/s2, no m/r/g, JVP not elevated  RESP: CTA B/l, no w/r/r  ABD: soft, nt, nd  EXT: warm, 2+ pulses, no edema, R groin site clean  SKIN: no visible lesions, rashes  NEURO: AAOx3, no focal deficits  PSYCH: Calm, cooperative    -------------------------------------------------------------------------------------------  LABS:                          10.9   8.11  )-----------( 241      ( 19 Oct 2023 06:22 )             34.5     10    138  |  104  |  29<H>  ----------------------------<  124<H>  3.6   |  21<L>  |  1.60<H>    Ca    8.6      19 Oct 2023 06:22      PTT - ( 19 Oct 2023 06:22 )  PTT:75.8 sec  CARDIAC MARKERS ( 14 Oct 2023 06:18 )  752 ng/L / x     / x     / 202 U/L / x     / 25.5 ng/mL      Total Cholesterol: 167  LDL: --  HDL: 52  T        -------------------------------------------------------------------------------------------  Meds:  acetaminophen     Tablet .. 650 milliGRAM(s) Oral every 6 hours PRN  albuterol    90 MICROgram(s) HFA Inhaler 2 Puff(s) Inhalation every 6 hours PRN  aspirin enteric coated 81 milliGRAM(s) Oral daily  atorvastatin 80 milliGRAM(s) Oral at bedtime  buMETAnide 1 milliGRAM(s) Oral daily  dextrose 5%. 1000 milliLiter(s) IV Continuous <Continuous>  dextrose 5%. 1000 milliLiter(s) IV Continuous <Continuous>  dextrose 50% Injectable 25 Gram(s) IV Push once  dextrose 50% Injectable 12.5 Gram(s) IV Push once  dextrose 50% Injectable 25 Gram(s) IV Push once  dextrose Oral Gel 15 Gram(s) Oral once PRN  glucagon  Injectable 1 milliGRAM(s) IntraMuscular once  guaiFENesin Oral Liquid (Sugar-Free) 100 milliGRAM(s) Oral every 6 hours PRN  heparin   Injectable 5000 Unit(s) SubCutaneous every 8 hours  hydrALAZINE 25 milliGRAM(s) Oral every 8 hours  insulin glargine Injectable (LANTUS) 30 Unit(s) SubCutaneous at bedtime  insulin lispro (ADMELOG) corrective regimen sliding scale   SubCutaneous three times a day before meals  insulin lispro (ADMELOG) corrective regimen sliding scale   SubCutaneous at bedtime  insulin lispro Injectable (ADMELOG) 10 Unit(s) SubCutaneous three times a day before meals  metoprolol succinate ER 50 milliGRAM(s) Oral daily  pantoprazole    Tablet 40 milliGRAM(s) Oral before breakfast  polyethylene glycol 3350 17 Gram(s) Oral daily PRN  senna 2 Tablet(s) Oral at bedtime  tamsulosin 0.4 milliGRAM(s) Oral at bedtime    -------------------------------------------------------------------------------------------  Cardiovascular Diagnostic Testing:      -------------------------------------------------------------------------------------------  Assessment and Plan:   57 yo Male pmhx CVA with mild left sided deficits, HTN, DM2, vertigo, HLD, Mobitz II s/p Medtronic dual chamber ICD, newly diagnosed HFrEF (EF 20%) who presents as a transfer from [facility] after admission for ADHF/PNA now transferred for ischemic eval in setting of newly depressed EF with noted WMA. [facility] w/ severe 3v disease.    1. HFrEF (20%) - new finding at OSH. Started on bumex, and metop. Remainder of GDMT pending improvement in renal fx. RHC placed on 10/19 showing RA 10, PCWP 29, CO 4.4, CI 2.3. HF team following  2. NSTEMI - trop peaked at OSH 25,000. Started on ASA/Plavix and statin. C w/ severe 3v disease, now being evaluated for CABG. cMRI showing akineses of the anterior and septal segments at the mid level w/ hypokinesis elsewhere, >50% subendocardial LGE within the med anteroseptum, anterior, and anterioal lates segments and apical segments; on further review LAD territory appears non-viable.  3. High degree block s/p dual chamber ICD - noted to be in A sensed V sensed tachycardic rhythm, in the 100-120s.   4. Possible LV thrombus seen on TTE: CMR w/o note of LV thrombus. Can stop AC    Recommendations:  - will discuss diuresis today given PCWP  - continue AC per CTS  - Continue ASA 81, atorva 80mg  - Continue metop succinate 50mg  - continue Bumex 1mg daily  - continue hydral  - will start ARNI and MRA post revascularization  - appreciate CTS evaluation  - appreciate HF recs    *** Recommendations are preliminary until cosigned by the attending.    Colby Santos MD  Cardiology Fellow    For all New Consults and Questions:  [email or web address]   Login: [email or web address] Progress Note  ------------------------------------------------------------------------------------------    SUBJECTIVE/EVENTS::  - Tele: A-sensed, V-paced Sinus tachycardia  - y/d RHC placed, RA 10, PCWP 28, CO 4.4, CI 2.3  - O/n noted to be slightly more tachycardic to the 130-140s, pt w/ mild palpitations, metop was increased    -------------------------------------------------------------------------------------------  ROS:  CV: chest pain (-), palpitation (+), orthopnea (-), PND (-), edema (-)  PULM: SOB (-), cough (-), wheezing (-), hemoptysis (-).   CONST: fever (-), chills (-) or fatigue (-)  GI: abdominal distension (-), abdominal pain (-) , nausea/vomiting (-), hematemesis, (-), melena (-), hematochezia (-)  : dysuria (-), frequency (-), hematuria (-).   NEURO: numbness (-), weakness (-), dizziness (-)  MSK: myalgia (-), joint pain (-)   SKIN: itching (-), rash (-)  HEENT:  visual changes (-); vertigo or throat pain (-);  neck stiffness (-)   Psych: change in mood (-), anxiety (-), depression (-)     All other review of systems is negative unless indicated above.   -------------------------------------------------------------------------------------------  VITALS:  T(F): 98 (10-20), Max: 98.4 (10-19)  HR: 123 (10-20) (118 - 147)  BP: 106/77 (10-20) (93/63 - 125/84)  RR: 20 (10-20)  SpO2: 95% (10-20)  I&O's Summary    18 Oct 2023 07:  -  19 Oct 2023 07:00  --------------------------------------------------------  IN: 900 mL / OUT: 0 mL / NET: 900 mL    19 Oct 2023 07:01  -  20 Oct 2023 06:14  --------------------------------------------------------  IN: 900 mL / OUT: 100 mL / NET: 800 mL      ------------------------------------------------------------------------------------------  PHYSICAL EXAM:  GEN: NAD, sitting in bed  HEENT: NCAT, EOMI  CV: RRR, Ns1/s2, no m/r/g, JVP not elevated  RESP: CTA B/l, no w/r/r  ABD: soft, nt, nd  EXT: warm, 2+ pulses, no edema, R groin site clean  SKIN: no visible lesions, rashes  NEURO: AAOx3, no focal deficits  PSYCH: Calm, cooperative    -------------------------------------------------------------------------------------------  LABS:                          10.9   8.11  )-----------( 241      ( 19 Oct 2023 06:22 )             34.5     10    138  |  104  |  29<H>  ----------------------------<  124<H>  3.6   |  21<L>  |  1.60<H>    Ca    8.6      19 Oct 2023 06:22      PTT - ( 19 Oct 2023 06:22 )  PTT:75.8 sec  CARDIAC MARKERS ( 14 Oct 2023 06:18 )  752 ng/L / x     / x     / 202 U/L / x     / 25.5 ng/mL      Total Cholesterol: 167  LDL: --  HDL: 52  T        -------------------------------------------------------------------------------------------  Meds:  acetaminophen     Tablet .. 650 milliGRAM(s) Oral every 6 hours PRN  albuterol    90 MICROgram(s) HFA Inhaler 2 Puff(s) Inhalation every 6 hours PRN  aspirin enteric coated 81 milliGRAM(s) Oral daily  atorvastatin 80 milliGRAM(s) Oral at bedtime  buMETAnide 1 milliGRAM(s) Oral daily  dextrose 5%. 1000 milliLiter(s) IV Continuous <Continuous>  dextrose 5%. 1000 milliLiter(s) IV Continuous <Continuous>  dextrose 50% Injectable 25 Gram(s) IV Push once  dextrose 50% Injectable 12.5 Gram(s) IV Push once  dextrose 50% Injectable 25 Gram(s) IV Push once  dextrose Oral Gel 15 Gram(s) Oral once PRN  glucagon  Injectable 1 milliGRAM(s) IntraMuscular once  guaiFENesin Oral Liquid (Sugar-Free) 100 milliGRAM(s) Oral every 6 hours PRN  heparin   Injectable 5000 Unit(s) SubCutaneous every 8 hours  hydrALAZINE 25 milliGRAM(s) Oral every 8 hours  insulin glargine Injectable (LANTUS) 30 Unit(s) SubCutaneous at bedtime  insulin lispro (ADMELOG) corrective regimen sliding scale   SubCutaneous three times a day before meals  insulin lispro (ADMELOG) corrective regimen sliding scale   SubCutaneous at bedtime  insulin lispro Injectable (ADMELOG) 10 Unit(s) SubCutaneous three times a day before meals  metoprolol succinate ER 50 milliGRAM(s) Oral daily  pantoprazole    Tablet 40 milliGRAM(s) Oral before breakfast  polyethylene glycol 3350 17 Gram(s) Oral daily PRN  senna 2 Tablet(s) Oral at bedtime  tamsulosin 0.4 milliGRAM(s) Oral at bedtime    -------------------------------------------------------------------------------------------  Cardiovascular Diagnostic Testing:      -------------------------------------------------------------------------------------------  Assessment and Plan:   57 yo Male pmhx CVA with mild left sided deficits, HTN, DM2, vertigo, HLD, Mobitz II s/p Medtronic dual chamber ICD, newly diagnosed HFrEF (EF 20%) who presents as a transfer from [facility] after admission for ADHF/PNA now transferred for ischemic eval in setting of newly depressed EF with noted WMA. [facility] w/ severe 3v disease.    1. HFrEF (20%) - new finding at OSH. Started on bumex, and metop. Remainder of GDMT pending improvement in renal fx. RHC placed on 10/19 showing RA 10, PCWP 29, CO 4.4, CI 2.3. HF team following  2. NSTEMI - trop peaked at OSH 25,000. Started on ASA/Plavix and statin. C w/ severe 3v disease, now being evaluated for CABG. cMRI showing akineses of the anterior and septal segments at the mid level w/ hypokinesis elsewhere, >50% subendocardial LGE within the med anteroseptum, anterior, and anterioal lates segments and apical segments; on further review LAD territory appears non-viable.  3. High degree block s/p dual chamber ICD - noted to be in A sensed V sensed tachycardic rhythm, in the 100-120s.   4. Possible LV thrombus seen on TTE: CMR w/o note of LV thrombus. Can stop AC    Recommendations:  - can give bumex 2mg IV BID today for diuresis given PCWP  - continue AC per CTS  - Continue ASA 81, atorva 80mg  - Continue metop succinate 50mg  - continue Bumex 1mg daily tomorrow  - continue hydral  - will start ARNI and MRA post revascularization  - appreciate CTS evaluation  - appreciate HF recs    *** Recommendations are preliminary until cosigned by the attending.    Colby Santos MD  Cardiology Fellow    For all New Consults and Questions:  [email or web address]   Login: [email or web address] Cardiology Progress Note  ------------------------------------------------------------------------------------------    SUBJECTIVE/EVENTS::  - Tele: A-sensed, V-paced Sinus tachycardia  - y/d RHC placed, RA 10, PCWP 28, CO 4.4, CI 2.3  - O/n noted to be slightly more tachycardic to the 130-140s, pt w/ mild palpitations, metop was increased    -------------------------------------------------------------------------------------------  ROS:  CV: chest pain (-), palpitation (+), orthopnea (-), PND (-), edema (-)  PULM: SOB (-), cough (-), wheezing (-), hemoptysis (-).   CONST: fever (-), chills (-) or fatigue (-)  GI: abdominal distension (-), abdominal pain (-) , nausea/vomiting (-), hematemesis, (-), melena (-), hematochezia (-)  : dysuria (-), frequency (-), hematuria (-).   NEURO: numbness (-), weakness (-), dizziness (-)  MSK: myalgia (-), joint pain (-)   SKIN: itching (-), rash (-)  HEENT:  visual changes (-); vertigo or throat pain (-);  neck stiffness (-)   Psych: change in mood (-), anxiety (-), depression (-)     All other review of systems is negative unless indicated above.   -------------------------------------------------------------------------------------------  VITALS:  T(F): 98 (10-20), Max: 98.4 (10-19)  HR: 123 (10-20) (118 - 147)  BP: 106/77 (10-20) (93/63 - 125/84)  RR: 20 (10-20)  SpO2: 95% (10-20)  I&O's Summary    18 Oct 2023 07:  -  19 Oct 2023 07:00  --------------------------------------------------------  IN: 900 mL / OUT: 0 mL / NET: 900 mL    19 Oct 2023 07:01  -  20 Oct 2023 06:14  --------------------------------------------------------  IN: 900 mL / OUT: 100 mL / NET: 800 mL      ------------------------------------------------------------------------------------------  PHYSICAL EXAM:  GEN: NAD, sitting in bed  HEENT: NCAT, EOMI  CV: RRR, Ns1/s2, no m/r/g, JVP not elevated  RESP: CTA B/l, no w/r/r  ABD: soft, nt, nd  EXT: warm, 2+ pulses, no edema, R groin site clean  SKIN: no visible lesions, rashes  NEURO: AAOx3, no focal deficits  PSYCH: Calm, cooperative    -------------------------------------------------------------------------------------------  LABS:                          10.9   8.11  )-----------( 241      ( 19 Oct 2023 06:22 )             34.5     10    138  |  104  |  29<H>  ----------------------------<  124<H>  3.6   |  21<L>  |  1.60<H>    Ca    8.6      19 Oct 2023 06:22      PTT - ( 19 Oct 2023 06:22 )  PTT:75.8 sec  CARDIAC MARKERS ( 14 Oct 2023 06:18 )  752 ng/L / x     / x     / 202 U/L / x     / 25.5 ng/mL      Total Cholesterol: 167  LDL: --  HDL: 52  T        -------------------------------------------------------------------------------------------  Meds:  acetaminophen     Tablet .. 650 milliGRAM(s) Oral every 6 hours PRN  albuterol    90 MICROgram(s) HFA Inhaler 2 Puff(s) Inhalation every 6 hours PRN  aspirin enteric coated 81 milliGRAM(s) Oral daily  atorvastatin 80 milliGRAM(s) Oral at bedtime  buMETAnide 1 milliGRAM(s) Oral daily  dextrose 5%. 1000 milliLiter(s) IV Continuous <Continuous>  dextrose 5%. 1000 milliLiter(s) IV Continuous <Continuous>  dextrose 50% Injectable 25 Gram(s) IV Push once  dextrose 50% Injectable 12.5 Gram(s) IV Push once  dextrose 50% Injectable 25 Gram(s) IV Push once  dextrose Oral Gel 15 Gram(s) Oral once PRN  glucagon  Injectable 1 milliGRAM(s) IntraMuscular once  guaiFENesin Oral Liquid (Sugar-Free) 100 milliGRAM(s) Oral every 6 hours PRN  heparin   Injectable 5000 Unit(s) SubCutaneous every 8 hours  hydrALAZINE 25 milliGRAM(s) Oral every 8 hours  insulin glargine Injectable (LANTUS) 30 Unit(s) SubCutaneous at bedtime  insulin lispro (ADMELOG) corrective regimen sliding scale   SubCutaneous three times a day before meals  insulin lispro (ADMELOG) corrective regimen sliding scale   SubCutaneous at bedtime  insulin lispro Injectable (ADMELOG) 10 Unit(s) SubCutaneous three times a day before meals  metoprolol succinate ER 50 milliGRAM(s) Oral daily  pantoprazole    Tablet 40 milliGRAM(s) Oral before breakfast  polyethylene glycol 3350 17 Gram(s) Oral daily PRN  senna 2 Tablet(s) Oral at bedtime  tamsulosin 0.4 milliGRAM(s) Oral at bedtime    -------------------------------------------------------------------------------------------  Cardiovascular Diagnostic Testing:      -------------------------------------------------------------------------------------------  Assessment and Plan:   59 yo Male pmhx CVA with mild left sided deficits, HTN, DM2, vertigo, HLD, Mobitz II s/p Medtronic dual chamber ICD, newly diagnosed HFrEF (EF 20%) who presents as a transfer from [facility] after admission for ADHF/PNA now transferred for ischemic eval in setting of newly depressed EF with noted WMA. [facility] w/ severe 3v disease.    1. HFrEF (20%) - new finding at OSH. Started on bumex, and metop. Remainder of GDMT pending improvement in renal fx. RHC placed on 10/19 showing RA 10, PCWP 29, CO 4.4, CI 2.3. HF team following  2. NSTEMI - trop peaked at OSH 25,000. Started on ASA/Plavix and statin. C w/ severe 3v disease, now being evaluated for CABG. cMRI showing akineses of the anterior and septal segments at the mid level w/ hypokinesis elsewhere, >50% subendocardial LGE within the med anteroseptum, anterior, and anterioal lates segments and apical segments; on further review LAD territory appears non-viable.  3. High degree block s/p dual chamber ICD - noted to be in A sensed V sensed tachycardic rhythm, in the 100-120s.   4. Possible LV thrombus seen on TTE: CMR w/o note of LV thrombus. Can stop AC    Recommendations:  - can give bumex 2mg IV BID today for diuresis given PCWP  - continue AC per CTS  - Continue ASA 81, atorva 80mg  - Continue metop succinate 50mg  - continue Bumex 1mg daily tomorrow  - continue hydral  - will start ARNI and MRA post revascularization  - appreciate CTS evaluation  - appreciate HF recs      Colby Santos MD  Cardiology Fellow    For all New Consults and Questions:  [email or web address]   Login: [facility] Progress Note  ------------------------------------------------------------------------------------------    SUBJECTIVE/EVENTS::  - Tele: A-sensed, V-paced Sinus tachycardia  - y/d RHC placed, RA 10, PCWP 28, CO 4.4, CI 2.3  - O/n noted to be slightly more tachycardic to the 130-140s, pt w/ mild palpitations, metop was increased o/n to 50mg qd  - this am appears mildly diaphoretic, warm on exam, reporting only mild palpitations    -------------------------------------------------------------------------------------------  ROS:  CV: chest pain (-), palpitation (+), orthopnea (-), PND (-), edema (-)  PULM: SOB (-), cough (-), wheezing (-), hemoptysis (-).   CONST: fever (-), chills (-) or fatigue (-)  GI: abdominal distension (-), abdominal pain (-) , nausea/vomiting (-), hematemesis, (-), melena (-), hematochezia (-)  : dysuria (-), frequency (-), hematuria (-).   NEURO: numbness (-), weakness (-), dizziness (-)  MSK: myalgia (-), joint pain (-)   SKIN: itching (-), rash (-)  HEENT:  visual changes (-); vertigo or throat pain (-);  neck stiffness (-)   Psych: change in mood (-), anxiety (-), depression (-)     All other review of systems is negative unless indicated above.   -------------------------------------------------------------------------------------------  VITALS:  T(F): 98 (10-20), Max: 98.4 (10-)  HR: 123 (10-) (118 - 147)  BP: 106/77 (10-) (93/63 - 125/84)  RR: 20 (10-20)  SpO2: 95% (10-20)  I&O's Summary    18 Oct 2023 07:  -  19 Oct 2023 07:00  --------------------------------------------------------  IN: 900 mL / OUT: 0 mL / NET: 900 mL    19 Oct 2023 07:01  -  20 Oct 2023 06:14  --------------------------------------------------------  IN: 900 mL / OUT: 100 mL / NET: 800 mL      ------------------------------------------------------------------------------------------  PHYSICAL EXAM:  GEN: NAD, sitting in bed  HEENT: NCAT, EOMI  CV: tachycardic, regular rate, Ns1/s2, no m/r/g  RESP: CTA B/l, no w/r/r  ABD: soft, nt, nd  EXT: warm, 2+ pulses, no edema, R groin site clean  SKIN: no visible lesions, rashes  NEURO: AAOx3, no focal deficits  PSYCH: Calm, cooperative    -------------------------------------------------------------------------------------------  LABS:                          10.9   8.11  )-----------( 241      ( 19 Oct 2023 06:22 )             34.5     10-19    138  |  104  |  29<H>  ----------------------------<  124<H>  3.6   |  21<L>  |  1.60<H>    Ca    8.6      19 Oct 2023 06:22      PTT - ( 19 Oct 2023 06:22 )  PTT:75.8 sec  CARDIAC MARKERS ( 14 Oct 2023 06:18 )  752 ng/L / x     / x     / 202 U/L / x     / 25.5 ng/mL      Total Cholesterol: 167  LDL: --  HDL: 52  T        -------------------------------------------------------------------------------------------  Meds:  acetaminophen     Tablet .. 650 milliGRAM(s) Oral every 6 hours PRN  albuterol    90 MICROgram(s) HFA Inhaler 2 Puff(s) Inhalation every 6 hours PRN  aspirin enteric coated 81 milliGRAM(s) Oral daily  atorvastatin 80 milliGRAM(s) Oral at bedtime  buMETAnide 1 milliGRAM(s) Oral daily  dextrose 5%. 1000 milliLiter(s) IV Continuous <Continuous>  dextrose 5%. 1000 milliLiter(s) IV Continuous <Continuous>  dextrose 50% Injectable 25 Gram(s) IV Push once  dextrose 50% Injectable 12.5 Gram(s) IV Push once  dextrose 50% Injectable 25 Gram(s) IV Push once  dextrose Oral Gel 15 Gram(s) Oral once PRN  glucagon  Injectable 1 milliGRAM(s) IntraMuscular once  guaiFENesin Oral Liquid (Sugar-Free) 100 milliGRAM(s) Oral every 6 hours PRN  heparin   Injectable 5000 Unit(s) SubCutaneous every 8 hours  hydrALAZINE 25 milliGRAM(s) Oral every 8 hours  insulin glargine Injectable (LANTUS) 30 Unit(s) SubCutaneous at bedtime  insulin lispro (ADMELOG) corrective regimen sliding scale   SubCutaneous three times a day before meals  insulin lispro (ADMELOG) corrective regimen sliding scale   SubCutaneous at bedtime  insulin lispro Injectable (ADMELOG) 10 Unit(s) SubCutaneous three times a day before meals  metoprolol succinate ER 50 milliGRAM(s) Oral daily  pantoprazole    Tablet 40 milliGRAM(s) Oral before breakfast  polyethylene glycol 3350 17 Gram(s) Oral daily PRN  senna 2 Tablet(s) Oral at bedtime  tamsulosin 0.4 milliGRAM(s) Oral at bedtime    -------------------------------------------------------------------------------------------  Cardiovascular Diagnostic Testing:      -------------------------------------------------------------------------------------------  Assessment and Plan:   57 yo Male pmhx CVA with mild left sided deficits, HTN, DM2, vertigo, HLD, Mobitz II s/p Medtronic dual chamber ICD, newly diagnosed HFrEF (EF 20%) who presents as a transfer from [facility] after admission for ADHF/PNA now transferred for ischemic eval in setting of newly depressed EF with noted WMA. [facility] w/ severe 3v disease.    1. HFrEF (20%) - new finding at OSH. Started on bumex, and metop. Remainder of GDMT pending improvement in renal fx. RHC placed on 10/19 showing RA 10, PCWP 29, CO 4.4, CI 2.3. HF team following  2. NSTEMI - trop peaked at OSH 25,000. Started on ASA/Plavix and statin. [facility] w/ severe 3v disease, now being evaluated for CABG. cMRI showing akineses of the anterior and septal segments at the mid level w/ hypokinesis elsewhere, >50% subendocardial LGE within the med anteroseptum, anterior, and anterioal lates segments and apical segments; on further review LAD territory appears non-viable. Will discuss w/ interventional re high risk PCI as likely most suitable option.  3. High degree block s/p dual chamber ICD - noted to be in A sensed V sensed tachycardic rhythm, in the 100-120s.   4. Possible LV thrombus seen on TTE: CMR w/o note of LV thrombus. Can stop AC    Recommendations:  - can give bumex 2mg IV BID today for diuresis given PCWP and more tachycardia  - will discuss clinical status change w/ HF team - may need to move to ICU  - will discuss w/ interventional cardiology for high risk PCI  - continue AC per CTS  - Continue ASA 81, atorva 80mg  - please reduce metop back to 25mg  - continue hydral  - will start ARNI and MRA post revascularization  - appreciate CTS evaluation  - appreciate HF recs      Colby Santos MD  Cardiology Fellow    For all New Consults and Questions:  [email or web address]   Login: [email or web address]

## 2023-10-20 NOTE — PROGRESS NOTE ADULT - PROBLEM SELECTOR PLAN 2
TTE with EF 20%   Continue metoprolol, hydralazine  On Bumex 1 mg PO daily- additional 2 mg IVP ordered given increased dyspnea this am  PCXR and RVP ordered (patient was treated for PNA at [facility] prior to transfer to [facility] for cath)

## 2023-10-20 NOTE — PROGRESS NOTE ADULT - SUBJECTIVE AND OBJECTIVE BOX
William Garcia MD    Patient is a 58y old  Male who presents with a chief complaint of NSTEMI (20 Oct 2023 06:13)        SUBJECTIVE / OVERNIGHT EVENTS: Patient seen and examined. Appears dyspneic this am. + cough, on supp O2  TELEMETRY: 's      MEDICATIONS  (STANDING):  aspirin enteric coated 81 milliGRAM(s) Oral daily  atorvastatin 80 milliGRAM(s) Oral at bedtime  buMETAnide 1 milliGRAM(s) Oral daily  buMETAnide Injectable 2 milliGRAM(s) IV Push once  dextrose 5%. 1000 milliLiter(s) (100 mL/Hr) IV Continuous <Continuous>  dextrose 5%. 1000 milliLiter(s) (50 mL/Hr) IV Continuous <Continuous>  dextrose 50% Injectable 12.5 Gram(s) IV Push once  dextrose 50% Injectable 25 Gram(s) IV Push once  dextrose 50% Injectable 25 Gram(s) IV Push once  glucagon  Injectable 1 milliGRAM(s) IntraMuscular once  heparin   Injectable 5000 Unit(s) SubCutaneous every 8 hours  hydrALAZINE 25 milliGRAM(s) Oral every 8 hours  insulin glargine Injectable (LANTUS) 30 Unit(s) SubCutaneous at bedtime  insulin lispro (ADMELOG) corrective regimen sliding scale   SubCutaneous three times a day before meals  insulin lispro (ADMELOG) corrective regimen sliding scale   SubCutaneous at bedtime  insulin lispro Injectable (ADMELOG) 10 Unit(s) SubCutaneous three times a day before meals  metoprolol succinate ER 50 milliGRAM(s) Oral daily  pantoprazole    Tablet 40 milliGRAM(s) Oral before breakfast  senna 2 Tablet(s) Oral at bedtime  tamsulosin 0.4 milliGRAM(s) Oral at bedtime    MEDICATIONS  (PRN):  acetaminophen     Tablet .. 650 milliGRAM(s) Oral every 6 hours PRN Temp greater or equal to 38C (100.4F), Mild Pain (1 - 3)  albuterol    90 MICROgram(s) HFA Inhaler 2 Puff(s) Inhalation every 6 hours PRN Shortness of Breath and/or Wheezing  benzocaine/menthol Lozenge 1 Lozenge Oral every 2 hours PRN Sore Throat  dextrose Oral Gel 15 Gram(s) Oral once PRN Blood Glucose LESS THAN 70 milliGRAM(s)/deciliter  guaiFENesin Oral Liquid (Sugar-Free) 100 milliGRAM(s) Oral every 6 hours PRN Cough  polyethylene glycol 3350 17 Gram(s) Oral daily PRN Constipation      Vital Signs Last 24 Hrs  T(C): 36.4 (20 Oct 2023 08:40), Max: 36.9 (19 Oct 2023 16:05)  T(F): 97.6 (20 Oct 2023 08:40), Max: 98.4 (19 Oct 2023 16:05)  HR: 119 (20 Oct 2023 08:40) (118 - 147)  BP: 119/87 (20 Oct 2023 08:40) (93/63 - 125/84)  BP(mean): --  RR: 18 (20 Oct 2023 08:40) (16 - 20)  SpO2: 95% (20 Oct 2023 08:40) (93% - 96%)    Parameters below as of 20 Oct 2023 08:40  Patient On (Oxygen Delivery Method): nasal cannula  O2 Flow (L/min): 2    CAPILLARY BLOOD GLUCOSE      POCT Blood Glucose.: 254 mg/dL (20 Oct 2023 08:13)  POCT Blood Glucose.: 116 mg/dL (19 Oct 2023 22:11)  POCT Blood Glucose.: 132 mg/dL (19 Oct 2023 16:56)  POCT Blood Glucose.: 103 mg/dL (19 Oct 2023 11:25)    I&O's Summary    19 Oct 2023 07:01  -  20 Oct 2023 07:00  --------------------------------------------------------  IN: 900 mL / OUT: 100 mL / NET: 800 mL    20 Oct 2023 07:01  -  20 Oct 2023 10:51  --------------------------------------------------------  IN: 240 mL / OUT: 0 mL / NET: 240 mL          PHYSICAL EXAM  GENERAL: NAD, well-developed  HEAD:  Atraumatic, normocephalic  EYES: EOMI, PERRLA, conjunctiva and sclera clear  CHEST/LUNG: Clear to auscultation bilaterally; no wheezes  HEART: +S1+S2, tachy, regular  ABDOMEN: Soft, nontender, nondistended; bowel sounds present  EXTREMITIES:  2+ peripheral pulses; no clubbing, cyanosis, or edema  PSYCH: AAOx2-3      LABS:                        11.3   10.73 )-----------( 277      ( 20 Oct 2023 06:17 )             36.4     10-20    133<L>  |  101  |  26<H>  ----------------------------<  171<H>  4.3   |  16<L>  |  1.48<H>    Ca    9.0      20 Oct 2023 06:17      PTT - ( 19 Oct 2023 06:22 )  PTT:75.8 sec      Urinalysis Basic - ( 20 Oct 2023 06:17 )    Color: x / Appearance: x / SG: x / pH: x  Gluc: 171 mg/dL / Ketone: x  / Bili: x / Urobili: x   Blood: x / Protein: x / Nitrite: x   Leuk Esterase: x / RBC: x / WBC x   Sq Epi: x / Non Sq Epi: x / Bacteria: x          RADIOLOGY & ADDITIONAL TESTS:    Imaging Personally Reviewed:  Consultant(s) Notes Reviewed:    Care Discussed with Consultants/Other Providers:

## 2023-10-20 NOTE — PROGRESS NOTE ADULT - NS ATTEND AMEND GEN_ALL_CORE FT
59 YO M with a history of CVA with residual mild R paresthesias, second degree Mobitz II heart block s/p DC-ICD 12/2022 (initial concern for sarcoid but negative biopsy/PET post procedure), DM2 (A1c 8.4%), and HTN who was admitted to [facility] with chest pain and dyspnea, found to have NSTEMI with markedly elevated troponins and new severe LV dysfunction with regional wall motion abnormalities as well as + enterovirus. He required NIPPV and was diuresed before being transferred to [facility] where LHC performed which revealed severe 3v CAD with critical proximal LAD involvement. CTS was consulted for CABG evaluation and HF consulted for comanagement.    He had concerning features including resting tachcyardia and poor non-invasive hemodynamics His cMRI also showed mostly nonviable LAD territory. He underwent RHC which showed moderately elevated L > R filing pressures and low-normal cardiac output and was being considered for PCI however he decompensated 10/20 with volume overload and signs of shock after beta blocker was increased for sinus tachycardia.    Will transfer to CICU for PAC guided therapy. He may need advanced therapies evaluation this admission but will try to optimize pharmacologically and can consider PCI or RCA/LCx if he improves.     REVIEW OF STUDIES  EKG: sinus tachycardia, septal q-waves, left axis deviation   TTE 10/16/23: LV 5.5 cm, LVEF 20% with regional WMAs worse in the apex, LVOT VTI 8 cm, normal RV size/function, severe functional MR, small pericardial effusion, estimated RA pressure 8 mmHg   TTE 12/2022: normal LVEF   LHC: 95% discrete proximal LAD disease and sequential multifocal disease with good distal target, 80-90% proximal LCx disease just prior to marginals, severe multifocal RCA disease with good targets   cMRI: LVEF 13%, greater than 50% LGE in mid LAD territory     PLAN  # Cardiogenic shock  - Transfer to CICU for PAC guided therapy, further recs to follow    # Acute systolic heart failure  - Etiology: ischemic  - GDMT: stop BB given signs of low output, continue hydralizine 25 mg TID. deferring RAASi/MRA/SGLT2i given fluctuating renal function   - Diuretics: current regimen is bumex 1 mg PO daily, switch to 4 mg IV BID, will reassess hemodynamics on RHC   - Device: has ICD in place and notably with high burden of RV pacing, pending ultimate decision may need to consider CRT upgrade or epicardial LV lead   - Advanced therapies: severe LV dysfunction with tachycardia and poor non-invasive hemodynamics with limited viability concerning. will attempt to optimize hemodynamics with PAC guided therapy and can consider PCI or LCx/RCA. if persistent poor hemodynamics will launch VAD/transplant evaluation. has good support and no clear psychosocial red flags though appears to have some cognitive defects. ABO AB. would need neurologic assessment with prior CVA and ideally improved conditioning given ongoing prolonged hospitalization.     # Severe functional MR  - needs reassessment after revascularization/GD<T    # Severe 3v CAD  - On ASA/statin, cMRI with limited LAD viability.  - Deemed too high risk for CABG  - Possible PCI pending clinical progress

## 2023-10-20 NOTE — PROGRESS NOTE ADULT - ASSESSMENT
58M hx CVA, HTN, DM2, vertigo, HLD, Mobitz II s/p ICD p/w NSTEMI to [facility], transferred to [facility], [facility] revealed 3v disease, undergoing CABG eval

## 2023-10-20 NOTE — PROGRESS NOTE ADULT - ATTENDING COMMENTS
58 year old man with prior stroke, mild residual deficits, transferred from [facility] for new severely reduced ejection fraction, prior history high grade heart block, ICD-P. Coronary angiography with severe 3 vessel disease, Cardiac MRI indicates non-viable myocardium in much of LAD distribution, but overall but potential to benefit from revascularization of RCA and possibly circumflex. Continuing to review with CT Surgery and Advanced Heart Failure to determine how to proceed. Consideration for margarito-procedure hemodynamic support. Right heart catheterization with elevated PCW 29, reduced cardiac index 2.3. Today heart rate somewhat further increased sinus tachycardia, patient denies dyspnea and has had no chest pain, but appears somewhat less comfortable on exam. Should increase diuretic, bumetanide to 2 mg.    To contact call Cardiology Fellow or Attending as listed on [email or web address] password: [email or web address].

## 2023-10-20 NOTE — PROGRESS NOTE ADULT - PROBLEM SELECTOR PLAN 2
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  - currently being evaluated for high risk PCI

## 2023-10-20 NOTE — PROGRESS NOTE ADULT - ASSESSMENT
Assessment & Plan:   57 yo Male pmhx CVA with mild left sided deficits, HTN, DM2, vertigo, HLD, Mobitz II s/pp Medtronic dual chamber ICD (12/21/22) initially presented to OSH for SOB c/f ADHF vs. PNA, later found to have NSTEMI transferred to [facility] for LHC evaluation. s/p LHC on 10/16 w/ 3v disease and RHC on 10/19 for hemodynamic assessment. Admitted to CICU for CABG eval vs. advanced therapy vs. high risk PCI    ======NEURO======  #Mental status  - baseline AAOx3, currently at baseline    #Hx of CVA w/ mild L residual deficit  - Stable  - Neuro checks    ======CV======  #CAD  #HTN  #HFrEF  Likely iso ischemic insult  ECHO 10/16: EF 20%, severely reduced LVSF  LHC 10/16: 95% pLAD, 80% mid and distal LCx, 90% mid and distal RCA  RHC 10/19: RA 10, PCWP 20, CO 4.4, CI 2.3  CMR 10/18: limited viability in LAD territory  - f/u HF, CTS recs  - Advance therapy vs. CABG vs. high risk PCI   - c/w ASA 81, Atorv 80mg  - c/w Bumex 4 q12  - c/w Hydralazine 25 q8h for AF reduction  - avoid BB/CCB given borderline cardiac function  - Start GDMT when appropriate    #Tachycardia  #Mobitz II s/p AICD  - Likely reflex tachycardiac iso hypoxia +/- NSTEMI +/- ADHF  - ctm    #HLD  - c/w Atorv 80mg     ======PULM======  #Pulmonary edema  - Oxygen therapy PRN keep O2%>92%  - Bumex 4 q12  - Albuterol PRN    ======RENAL======  #LAURA:   sCr on admission 1.28, uptrending likely iso NSTEMI and HFrEF  - Bumex PRN  - Monitor I/O  - Trend sCr  - Renally dose meds, avoid nephrotoxins    #Hyponatremia  - mild hypoNa to 133 noted on 10/20, asymptomatic  - likely iso CHF +/- LAURA  - ctm    #Elevated lactate  - uptrending lactate noted likely iso NSTEMI  - monitr VS  - trend lactate    #BPH  - c/w Flomax 0.4mg qhs    ======GI======  - No active issues  - CC DASH Halal diet  - GI ppx: PPI 40mg daily  - c/w Bowel regimen    ======ENDO======  #DM2  - A1c 10/14 8.4%  - c/w Lantus 30u qhs + lispro 10u premeal + ISS  - FS    ======HEMATOLOGIC======  - No active issues  - DVT ppx: HSQ iso LAURA    ======ID======  - No active issues  - Recent admission to OSH for ?PNA s/p 14 days of cefepime (last dose 10/14)  - Currently no signs of infection, WBC 10.6, afebrile, no indications for abx. Assessment & Plan:     59 yo Male pmhx CVA with mild left sided deficits, HTN, DM2, vertigo, HLD, Mobitz II s/pp Medtronic dual chamber ICD (12/21/22) initially presented to OSH for SOB c/f ADHF vs. PNA, later found to have NSTEMI transferred to [facility] for LHC evaluation. s/p LHC on 10/16 w/ 3v disease and RHC on 10/19 for hemodynamic assessment. Admitted to CICU for CABG eval vs. advanced therapy vs. high risk PCI    ======NEURO======  #Mental status  - Baseline AAOx3, currently at baseline    #Hx of CVA w/ mild L residual deficit  - Neurological assessment per protocol     ======CV======  #CAD  #HTN  #HFrEF  Likely iso ischemic insult  ECHO 10/16: EF 20%, severely reduced LVSF  LHC 10/16: 95% pLAD, 80% mid and distal LCx, 90% mid and distal RCA  RHC 10/19: RA 10, PCWP 20, CO 4.4, CI 2.3  CMR 10/18: limited viability in LAD territory  - f/u HF, CTS recs  - Advance therapy vs. CABG vs. high risk PCI   - c/w ASA 81, Atorv 80mg  - c/w Bumex 4 q12  - c/w Hydralazine 25 q8h for AF reduction  - avoid BB/CCB given borderline cardiac function  - Start GDMT when appropriate    #Tachycardia  #Mobitz II s/p AICD  - Likely reflex tachycardiac iso hypoxia +/- NSTEMI +/- ADHF  - Continue to monitor    #HLD  - c/w Atorv 80mg     ======PULM======  #Pulmonary edema  - Oxygen therapy PRN keep O2%>92%  - Bumex 4 q12  - Albuterol PRN    ======RENAL======  #LAURA:   sCr on admission 1.28, uptrending likely iso NSTEMI and HFrEF  - Bumex PRN  - Monitor I/O  - Trend sCr  - Renally dose meds, avoid nephrotoxins    #Hyponatremia  - mild hypoNa to 133 noted on 10/20, asymptomatic  - likely iso CHF +/- LAURA  - Continue to trend    #Elevated lactate  - uptrending lactate noted likely iso NSTEMI  - monitr VS  - trend lactate    #BPH  - c/w Flomax 0.4mg qhs    ======GI======  - No active issues  - CC DASH Halal diet  - GI ppx: PPI 40mg daily  - c/w Bowel regimen    ======ENDO======  #DM2  - A1c 10/14 8.4%  - c/w Lantus 30u qhs + lispro 10u premeal + ISS  - FS    ======HEMATOLOGIC======  - No active issues  - DVT ppx: HSQ iso LAURA    ======ID======  - No active issues  - Recent admission to OSH for possible PNA s/p 14 days of cefepime (last dose 10/14)  - Currently no signs of infection, WBC 10.6, afebrile, no indications for abx.    Lines:  Left radial arterial line 10/20

## 2023-10-20 NOTE — PROGRESS NOTE ADULT - PROBLEM SELECTOR PLAN 1
LHC revealed 3v disease, undergoing CABG evaluation by CTS  Continue ASA, metoprolol, atorvastatin   Cardiac MRI with sufficient regions to benefit from revascularization  HF following- s/p RHC, considering perioperative hemodynamic support

## 2023-10-20 NOTE — PROGRESS NOTE ADULT - SUBJECTIVE AND OBJECTIVE BOX
ADVANCED HEART FAILURE & TRANSPLANT- PROGRESS NOTE  *To reach the NS1 Team from 8am to 5pm, please call [phone].  ___________________________________________________________________________    Subjective:  - overnight was more tachycardiac, felt palpations  - this AM feels ok but notices worsening SOB    Medications:  acetaminophen     Tablet .. 650 milliGRAM(s) Oral every 6 hours PRN  albuterol    90 MICROgram(s) HFA Inhaler 2 Puff(s) Inhalation every 6 hours PRN  aspirin enteric coated 81 milliGRAM(s) Oral daily  atorvastatin 80 milliGRAM(s) Oral at bedtime  benzocaine/menthol Lozenge 1 Lozenge Oral every 2 hours PRN  buMETAnide IVPB 4 milliGRAM(s) IV Intermittent two times a day  dextrose 5%. 1000 milliLiter(s) IV Continuous <Continuous>  dextrose 5%. 1000 milliLiter(s) IV Continuous <Continuous>  dextrose 50% Injectable 12.5 Gram(s) IV Push once  dextrose 50% Injectable 25 Gram(s) IV Push once  dextrose 50% Injectable 25 Gram(s) IV Push once  dextrose Oral Gel 15 Gram(s) Oral once PRN  glucagon  Injectable 1 milliGRAM(s) IntraMuscular once  guaiFENesin Oral Liquid (Sugar-Free) 100 milliGRAM(s) Oral every 6 hours PRN  heparin   Injectable 5000 Unit(s) SubCutaneous every 8 hours  hydrALAZINE 25 milliGRAM(s) Oral every 8 hours  insulin glargine Injectable (LANTUS) 30 Unit(s) SubCutaneous at bedtime  insulin lispro (ADMELOG) corrective regimen sliding scale   SubCutaneous three times a day before meals  insulin lispro (ADMELOG) corrective regimen sliding scale   SubCutaneous at bedtime  insulin lispro Injectable (ADMELOG) 10 Unit(s) SubCutaneous three times a day before meals  pantoprazole    Tablet 40 milliGRAM(s) Oral before breakfast  polyethylene glycol 3350 17 Gram(s) Oral daily PRN  senna 2 Tablet(s) Oral at bedtime  tamsulosin 0.4 milliGRAM(s) Oral at bedtime      Vitals:  Vital Signs Last 24 Hours  T(C): 36.8 (10-20-23 @ 13:53), Max: 36.9 (10-19-23 @ 16:05)  HR: 121 (10-20-23 @ 15:00) (119 - 147)  BP: 135/85 (10-20-23 @ 15:00) (99/66 - 135/85)  RR: 18 (10-20-23 @ 15:00) (16 - 20)  SpO2: 92% (10-20-23 @ 15:00) (90% - 95%)    Weight in k.6 (10-20 @ 05:44)    I&O's Summary    19 Oct 2023 07:  -  20 Oct 2023 07:00  --------------------------------------------------------  IN: 900 mL / OUT: 100 mL / NET: 800 mL    20 Oct 2023 07:01  -  20 Oct 2023 15:13  --------------------------------------------------------  IN: 290 mL / OUT: 0 mL / NET: 290 mL        Physical Exam  General: appears lethargic   Neck: JVP ~ 20 cm  Chest: crackles at bases b/l   CV: Normal S1 and S2.  Abdomen: Soft, non-distended, non-tender  Extremities: cool to touch, 1+ edema   Neurology: Alert and oriented times three.     Labs:                        11.7   10.61 )-----------( 305      ( 20 Oct 2023 12:32 )             35.8     10-20    133<L>  |  100  |  31<H>  ----------------------------<  193<H>  4.6   |  15<L>  |  1.66<H>    Ca    9.1      20 Oct 2023 12:32    TPro  6.9  /  Alb  3.4  /  TBili  0.4  /  DBili  0.1  /  AST  50<H>  /  ALT  34  /  AlkPhos  78  10-20    PTT - ( 19 Oct 2023 06:22 )  PTT:75.8 sec            Lactate, Blood: 3.5 mmol/L (10-20 @ 12:32)

## 2023-10-20 NOTE — PROGRESS NOTE ADULT - ASSESSMENT
59 YO M with a history of CVA with residual mild R paresthesias, second degree Mobitz II heart block s/p DC-ICD 12/2022 (initial concern for sarcoid but negative biopsy/PET post procedure), DM2 (A1c 8.4%), and HTN who was admitted to [facility] with chest pain and dyspnea, found to have NSTEMI with markedly elevated troponins and new severe LV dysfunction with regional wall motion abnormalities as well as + enterovirus. He required NIPPV and was diuresed before being transferred to [facility] where LHC performed which revealed severe 3v CAD with critical proximal LAD involvement. CTS was consulted for CABG evaluation and HF consulted for comanagement.    He ultimately underwent RHC with revealed elevated wedge but normal cardiac output. Within the past 24 hours patient has become more tachycardic, is cool on exam and noted to have elevated JVP. He was transferred to CICU for swan placement and closer observation of hemodynamics. At this time patient would be consider poor candidate for CABG however would  benefit from high risk PCI with the potential need for advance therapies          REVIEW OF STUDIES  RHC 10/19: RA 10, RV 47/9/13, Wedge 29, PA 41/26/33, CO/CI 4.4/2.3, PA sat 57.3  EKG: sinus tachycardia, septal q-waves, left axis deviation   TTE 10/16/23: LV 5.5 cm, LVEF 20% with regional WMAs worse in the apex, LVOT VTI 8 cm, normal RV size/function, severe functional MR, small pericardial effusion, estimated RA pressure 8 mmHg   TTE 12/2022: normal LVEF   LHC: 95% discrete proximal LAD disease and sequential multifocal disease with good distal target, 80-90% proximal LCx disease just prior to marginals, severe multifocal RCA disease with good targets

## 2023-10-20 NOTE — PROGRESS NOTE ADULT - PROBLEM SELECTOR PLAN 1
- Etiology: ischemic  - GDMT: discontinue Torpol XL at this time, deferring RAASi/MRA/SGLT2i given concerns of cardiogenic shock   - Diuretics: increase is bumex 4mg IV BID  - Today patient is noted to be more tachycardiac with HR up to 140s and is now cool on exam with elevated JVP. Will plan to transfer to CICU for swan placement     - Device: has ICD in place and notably with high burden of RV pacing  - Advanced therapies: severe LV dysfunction with tachycardia and poor non-invasive hemodynamics concerning, currently holding off on launching VAD/transplant eval however may need to reassess. Of note he has good support and no clear psychosocial red flags. ABO AB. would need neurologic assessment with prior CVA and ideally improved conditioning given ongoing prolonged hospitalization.

## 2023-10-20 NOTE — PROGRESS NOTE ADULT - SUBJECTIVE AND OBJECTIVE BOX
BRANDEN MARRUFO  MRN-[id number]  Patient is a 58y old  Male who presents with a chief complaint of NSTEMI (20 Oct 2023 15:11)    HPI:  Patient with separate chart from [facility], MRN# [id number]    57 yo Male pmhx CVA with mild left sided deficits, HTN, DM2, vertigo, HLD, Mobitz II s/pp Medtronic dual chamber ICD (12/21/22) initially presented to [facility] from home with complaints of dyspnea and chest pain and was admitted w/ acute hypoxic respiratory failure likely due to acute pulmonary edema due to acute HFrEF in setting of acute NSTEMI, LAURA and enterovirus infection. Pt with brief MICU stay at [facility] requiring bipap (no intubation), was treated for PNA with cefepime, and was found to have TTE done 9/26/23 showing EF 20% with severely decreased LVSF, multiple regional wall motion abnormalities, and elevated LV end diastolic pressure. Pt was transferred to [facility] for cardiac cath evaluation. Patient without any acute complaints, complaining of intermittent palpitations for the last 2 days. No chest pain, SOB, fevers, nausea, vomiting, abdominal pain.  (13 Oct 2023 21:05)      24 HOUR EVENTS:    REVIEW OF SYSTEMS:    CONSTITUTIONAL: No weakness, fevers or chills  EYES/ENT: No visual changes;  No vertigo or throat pain   NECK: No pain or stiffness  RESPIRATORY: No cough, wheezing, hemoptysis; No shortness of breath  CARDIOVASCULAR: No chest pain or palpitations  GASTROINTESTINAL: No abdominal or epigastric pain. No nausea, vomiting, or hematemesis; No diarrhea or constipation. No melena or hematochezia.  GENITOURINARY: No dysuria, frequency or hematuria  NEUROLOGICAL: No numbness or weakness  SKIN: No itching, rashes      ICU Vital Signs Last 24 Hrs  T(C): 36.8 (20 Oct 2023 13:53), Max: 36.8 (20 Oct 2023 13:53)  T(F): 98.3 (20 Oct 2023 13:53), Max: 98.3 (20 Oct 2023 13:53)  HR: 120 (20 Oct 2023 19:01) (116 - 147)  BP: 113/79 (20 Oct 2023 19:01) (106/77 - 135/85)  BP(mean): 82 (20 Oct 2023 19:01) (82 - 103)  ABP: --  ABP(mean): --  RR: 30 (20 Oct 2023 19:01) (16 - 30)  SpO2: 96% (20 Oct 2023 19:01) (90% - 96%)    O2 Parameters below as of 20 Oct 2023 19:01  Patient On (Oxygen Delivery Method): nasal cannula  O2 Flow (L/min): 5          CVP(mm Hg): --  CO: --  CI: --  PA: --  PA(mean): --  PA(direct): --  PCWP: --  LA: --  RA: --  SVR: --  SVRI: --  PVR: --  PVRI: --  I&O's Summary    19 Oct 2023 07:01  -  20 Oct 2023 07:00  --------------------------------------------------------  IN: 900 mL / OUT: 100 mL / NET: 800 mL    20 Oct 2023 07:01  -  20 Oct 2023 19:16  --------------------------------------------------------  IN: 340 mL / OUT: 200 mL / NET: 140 mL        CAPILLARY BLOOD GLUCOSE    CAPILLARY BLOOD GLUCOSE      POCT Blood Glucose.: 157 mg/dL (20 Oct 2023 18:36)      PHYSICAL EXAM:  GENERAL: No acute distress, well-developed  HEAD:  Atraumatic, Normocephalic  EYES: EOMI, PERRLA, conjunctiva and sclera clear  NECK: Supple, no lymphadenopathy, no JVD  CHEST/LUNG: CTAB; No wheezes, rales, or rhonchi  HEART: Regular rate and rhythm. Normal S1/S2. No murmurs, rubs, or gallops  ABDOMEN: Soft, non-tender, non-distended; normal bowel sounds, no organomegaly  EXTREMITIES:  2+ peripheral pulses b/l, No clubbing, cyanosis, or edema  NEUROLOGY: A&O x 3, no focal deficits  SKIN: No rashes or lesions    ============================I/O===========================   I&O's Detail    19 Oct 2023 07:01  -  20 Oct 2023 07:00  --------------------------------------------------------  IN:    Oral Fluid: 900 mL  Total IN: 900 mL    OUT:    Voided (mL): 100 mL  Total OUT: 100 mL    Total NET: 800 mL      20 Oct 2023 07:01  -  20 Oct 2023 19:16  --------------------------------------------------------  IN:    IV PiggyBack: 100 mL    Oral Fluid: 240 mL  Total IN: 340 mL    OUT:    Voided (mL): 200 mL  Total OUT: 200 mL    Total NET: 140 mL        ============================ LABS =========================                        11.7   10.61 )-----------( 305      ( 20 Oct 2023 12:32 )             35.8     10-20    133<L>  |  100  |  31<H>  ----------------------------<  193<H>  4.6   |  15<L>  |  1.66<H>    Ca    9.1      20 Oct 2023 12:32    TPro  6.9  /  Alb  3.4  /  TBili  0.4  /  DBili  0.1  /  AST  50<H>  /  ALT  34  /  AlkPhos  78  10-20                LIVER FUNCTIONS - ( 20 Oct 2023 12:32 )  Alb: 3.4 g/dL / Pro: 6.9 g/dL / ALK PHOS: 78 U/L / ALT: 34 U/L / AST: 50 U/L / GGT: x           PTT - ( 19 Oct 2023 06:22 )  PTT:75.8 sec    Blood Gas Venous - Lactate: 1.9 mmol/L (10-20-23 @ 17:06)  Lactate, Blood: 3.5 mmol/L (10-20-23 @ 12:32)  Blood Gas Venous - Lactate: 2.9 mmol/L (10-20-23 @ 12:30)    Urinalysis Basic - ( 20 Oct 2023 12:32 )    Color: x / Appearance: x / SG: x / pH: x  Gluc: 193 mg/dL / Ketone: x  / Bili: x / Urobili: x   Blood: x / Protein: x / Nitrite: x   Leuk Esterase: x / RBC: x / WBC x   Sq Epi: x / Non Sq Epi: x / Bacteria: x      ======================Micro/Rad/Cardio=================  Telemetry: Reviewed   EKG: Reviewed  CXR: Reviewed  Culture: Reviewed   Echo:   Cath:   ======================================================  PAST MEDICAL & SURGICAL HISTORY:  CVA (cerebrovascular accident)      T2DM (type 2 diabetes mellitus)      HTN (hypertension)      HLD (hyperlipidemia)      S/P cystoscopy      S/P hernia repair         BRANDEN MARRUFO  MRN-[id number]  Patient is a 58y old  Male who presents with a chief complaint of NSTEMI (20 Oct 2023 15:11)    HPI:  Patient with separate chart from [facility], MRN# [id number]    57 yo Male pmhx CVA with mild left sided deficits, HTN, DM2, vertigo, HLD, Mobitz II s/pp Medtronic dual chamber ICD (12/21/22) initially presented to [facility] from home with complaints of dyspnea and chest pain and was admitted w/ acute hypoxic respiratory failure likely due to acute pulmonary edema due to acute HFrEF in setting of acute NSTEMI, LAURA and enterovirus infection. Pt with brief MICU stay at [facility] requiring bipap (no intubation), was treated for PNA with cefepime, and was found to have TTE done 9/26/23 showing EF 20% with severely decreased LVSF, multiple regional wall motion abnormalities, and elevated LV end diastolic pressure. Pt was transferred to [facility] for cardiac cath evaluation. Patient without any acute complaints, complaining of intermittent palpitations for the last 2 days. No chest pain, SOB, fevers, nausea, vomiting, abdominal pain.  (13 Oct 2023 21:05)    24 HOUR EVENTS:  -Patient seen and examined. Denies shortness of breath or chest pain.  REVIEW OF SYSTEMS:    CONSTITUTIONAL: No weakness, fevers or chills  EYES/ENT: No visual changes;  No vertigo or throat pain   NECK: No pain or stiffness  RESPIRATORY: No cough, wheezing, hemoptysis; No shortness of breath  CARDIOVASCULAR: No chest pain or palpitations  GASTROINTESTINAL: No abdominal or epigastric pain. No nausea, vomiting, or hematemesis; No diarrhea or constipation. No melena or hematochezia.  GENITOURINARY: No dysuria, frequency or hematuria  NEUROLOGICAL: No numbness or weakness  SKIN: No itching, rashes      ICU Vital Signs Last 24 Hrs  T(C): 36.8 (20 Oct 2023 13:53), Max: 36.8 (20 Oct 2023 13:53)  T(F): 98.3 (20 Oct 2023 13:53), Max: 98.3 (20 Oct 2023 13:53)  HR: 120 (20 Oct 2023 19:01) (116 - 147)  BP: 113/79 (20 Oct 2023 19:01) (106/77 - 135/85)  BP(mean): 82 (20 Oct 2023 19:01) (82 - 103)  RR: 30 (20 Oct 2023 19:01) (16 - 30)  SpO2: 96% (20 Oct 2023 19:01) (90% - 96%)    O2 Parameters below as of 20 Oct 2023 19:01  Patient On (Oxygen Delivery Method): nasal cannula  O2 Flow (L/min): 5          CVP(mm Hg): --  CO: --  CI: --  PA: --  PA(mean): --  PA(direct): --  PCWP: --  LA: --  RA: --  SVR: --  SVRI: --  PVR: --  PVRI: --  I&O's Summary    19 Oct 2023 07:01  -  20 Oct 2023 07:00  --------------------------------------------------------  IN: 900 mL / OUT: 100 mL / NET: 800 mL    20 Oct 2023 07:01  -  20 Oct 2023 19:16  --------------------------------------------------------  IN: 340 mL / OUT: 200 mL / NET: 140 mL        CAPILLARY BLOOD GLUCOSE    CAPILLARY BLOOD GLUCOSE      POCT Blood Glucose.: 157 mg/dL (20 Oct 2023 18:36)      PHYSICAL EXAM:  GENERAL: No acute distress, well-developed  HEAD:  Atraumatic, Normocephalic  EYES: EOMI, PERRLA, conjunctiva and sclera clear  NECK: Supple, no lymphadenopathy, no JVD  CHEST/LUNG: CTAB; No wheezes, rales, or rhonchi  HEART: Regular rate and rhythm. Normal S1/S2. No murmurs, rubs, or gallops  ABDOMEN: Soft, non-tender, non-distended; normal bowel sounds, no organomegaly  EXTREMITIES:  2+ peripheral pulses b/l, No clubbing, cyanosis, or edema  NEUROLOGY: A&O x 3, no focal deficits  SKIN: No rashes or lesions    ============================I/O===========================   I&O's Detail    19 Oct 2023 07:01  -  20 Oct 2023 07:00  --------------------------------------------------------  IN:    Oral Fluid: 900 mL  Total IN: 900 mL    OUT:    Voided (mL): 100 mL  Total OUT: 100 mL    Total NET: 800 mL      20 Oct 2023 07:01  -  20 Oct 2023 19:16  --------------------------------------------------------  IN:    IV PiggyBack: 100 mL    Oral Fluid: 240 mL  Total IN: 340 mL    OUT:    Voided (mL): 200 mL  Total OUT: 200 mL    Total NET: 140 mL        ============================ LABS =========================                        11.7   10.61 )-----------( 305      ( 20 Oct 2023 12:32 )             35.8     10-20    133<L>  |  100  |  31<H>  ----------------------------<  193<H>  4.6   |  15<L>  |  1.66<H>    Ca    9.1      20 Oct 2023 12:32    TPro  6.9  /  Alb  3.4  /  TBili  0.4  /  DBili  0.1  /  AST  50<H>  /  ALT  34  /  AlkPhos  78  10-20                LIVER FUNCTIONS - ( 20 Oct 2023 12:32 )  Alb: 3.4 g/dL / Pro: 6.9 g/dL / ALK PHOS: 78 U/L / ALT: 34 U/L / AST: 50 U/L / GGT: x           PTT - ( 19 Oct 2023 06:22 )  PTT:75.8 sec    Blood Gas Venous - Lactate: 1.9 mmol/L (10-20-23 @ 17:06)  Lactate, Blood: 3.5 mmol/L (10-20-23 @ 12:32)  Blood Gas Venous - Lactate: 2.9 mmol/L (10-20-23 @ 12:30)    Urinalysis Basic - ( 20 Oct 2023 12:32 )    Color: x / Appearance: x / SG: x / pH: x  Gluc: 193 mg/dL / Ketone: x  / Bili: x / Urobili: x   Blood: x / Protein: x / Nitrite: x   Leuk Esterase: x / RBC: x / WBC x   Sq Epi: x / Non Sq Epi: x / Bacteria: x      ======================Micro/Rad/Cardio=================  Telemetry: Reviewed   EKG: Reviewed  CXR: Reviewed  Culture: Reviewed   Echo:   Cath:   ======================================================  PAST MEDICAL & SURGICAL HISTORY:  CVA (cerebrovascular accident)      T2DM (type 2 diabetes mellitus)      HTN (hypertension)      HLD (hyperlipidemia)      S/P cystoscopy      S/P hernia repair         BRANDEN MARRUFO  MRN-[id number]  Patient is a 58y old  Male who presents with a chief complaint of NSTEMI (20 Oct 2023 15:11)    HPI:  Patient with separate chart from [facility], MRN# [id number]    59 yo Male pmhx CVA with mild left sided deficits, HTN, DM2, vertigo, HLD, Mobitz II s/pp Medtronic dual chamber ICD (12/21/22) initially presented to [facility] from home with complaints of dyspnea and chest pain and was admitted w/ acute hypoxic respiratory failure likely due to acute pulmonary edema due to acute HFrEF in setting of acute NSTEMI, LAURA and enterovirus infection. Pt with brief MICU stay at [facility] requiring bipap (no intubation), was treated for PNA with cefepime, and was found to have TTE done 9/26/23 showing EF 20% with severely decreased LVSF, multiple regional wall motion abnormalities, and elevated LV end diastolic pressure. Pt was transferred to [facility] for cardiac cath evaluation. Patient without any acute complaints, complaining of intermittent palpitations for the last 2 days. No chest pain, SOB, fevers, nausea, vomiting, abdominal pain.  (13 Oct 2023 21:05)    24 HOUR EVENTS:  -Patient seen and examined. Denies shortness of breath or chest pain.    REVIEW OF SYSTEMS:    CONSTITUTIONAL: No weakness  EYES/ENT: No visual changes  NECK: No pain or stiffness  RESPIRATORY: No cough, No shortness of breath  CARDIOVASCULAR: No chest pain or palpitations  GASTROINTESTINAL: No abdominal or epigastric pain.   GENITOURINARY: No dysuria, frequency or hematuria  NEUROLOGICAL: No numbness or weakness  SKIN: No itching, rashes      ICU Vital Signs Last 24 Hrs  T(C): 36.8 (20 Oct 2023 13:53), Max: 36.8 (20 Oct 2023 13:53)  T(F): 98.3 (20 Oct 2023 13:53), Max: 98.3 (20 Oct 2023 13:53)  HR: 120 (20 Oct 2023 19:01) (116 - 147)  BP: 113/79 (20 Oct 2023 19:01) (106/77 - 135/85)  BP(mean): 82 (20 Oct 2023 19:01) (82 - 103)  RR: 30 (20 Oct 2023 19:01) (16 - 30)  SpO2: 96% (20 Oct 2023 19:01) (90% - 96%)    O2 Parameters below as of 20 Oct 2023 19:01  Patient On (Oxygen Delivery Method): nasal cannula  O2 Flow (L/min): 5    I&O's Summary    19 Oct 2023 07:01  -  20 Oct 2023 07:00  --------------------------------------------------------  IN: 900 mL / OUT: 100 mL / NET: 800 mL    20 Oct 2023 07:01  -  20 Oct 2023 19:16  --------------------------------------------------------  IN: 340 mL / OUT: 200 mL / NET: 140 mL    CAPILLARY BLOOD GLUCOSE    POCT Blood Glucose.: 157 mg/dL (20 Oct 2023 18:36)    PHYSICAL EXAM:  GENERAL: No acute distress  HEAD:  Atraumatic, Normocephalic  EYES: EOMI, PERRLA, conjunctiva and sclera clear  NECK: Supple, no lymphadenopathy, no JVD  CHEST/LUNG: Lung sounds clear, diminished at the bases  HEART: Regular rate and rhythm.   ABDOMEN: Soft, non-tender, non-distended; normal bowel sounds, no organomegaly  EXTREMITIES:  2+ peripheral pulses b/l, No clubbing, cyanosis, or edema  NEUROLOGY: A&O x 3, no focal deficits  SKIN: No rashes or lesions  ============================I/O========================   I&O's Detail    19 Oct 2023 07:01  -  20 Oct 2023 07:00  --------------------------------------------------------  IN:    Oral Fluid: 900 mL  Total IN: 900 mL    OUT:    Voided (mL): 100 mL  Total OUT: 100 mL    Total NET: 800 mL    20 Oct 2023 07:01  -  20 Oct 2023 19:16  --------------------------------------------------------  IN:    IV PiggyBack: 100 mL    Oral Fluid: 240 mL  Total IN: 340 mL    OUT:    Voided (mL): 200 mL  Total OUT: 200 mL    Total NET: 140 mL  ============================ LABS =========================                        11.7   10.61 )-----------( 305      ( 20 Oct 2023 12:32 )             35.8     10-20    133<L>  |  100  |  31<H>  ----------------------------<  193<H>  4.6   |  15<L>  |  1.66<H>    Ca    9.1      20 Oct 2023 12:32    TPro  6.9  /  Alb  3.4  /  TBili  0.4  /  DBili  0.1  /  AST  50<H>  /  ALT  34  /  AlkPhos  78  10-20    LIVER FUNCTIONS - ( 20 Oct 2023 12:32 )  Alb: 3.4 g/dL / Pro: 6.9 g/dL / ALK PHOS: 78 U/L / ALT: 34 U/L / AST: 50 U/L / GGT: x           PTT - ( 19 Oct 2023 06:22 )  PTT:75.8 sec    Blood Gas Venous - Lactate: 1.9 mmol/L (10-20-23 @ 17:06)  Lactate, Blood: 3.5 mmol/L (10-20-23 @ 12:32)  Blood Gas Venous - Lactate: 2.9 mmol/L (10-20-23 @ 12:30)    Urinalysis Basic - ( 20 Oct 2023 12:32 )    Color: x / Appearance: x / SG: x / pH: x  Gluc: 193 mg/dL / Ketone: x  / Bili: x / Urobili: x   Blood: x / Protein: x / Nitrite: x   Leuk Esterase: x / RBC: x / WBC x   Sq Epi: x / Non Sq Epi: x / Bacteria: x  ======================Micro/Rad/Cardio=================  Telemetry: Reviewed   EKG: Reviewed  CXR: Reviewed  Culture: Reviewed   ===================================================  PAST MEDICAL & SURGICAL HISTORY:  CVA (cerebrovascular accident)    T2DM (type 2 diabetes mellitus)    HTN (hypertension)    HLD (hyperlipidemia)    S/P cystoscopy    S/P hernia repair

## 2023-10-20 NOTE — CHART NOTE - NSCHARTNOTEFT_GEN_A_CORE
CCU Accept Note    BRANDEN MARRUFO  58y  Patient is a 58y old  Male who presents with a chief complaint of NSTEMI (20 Oct 2023 10:51)      ====================  HPI & Hospital Course:   59 yo Male pmhx CVA with mild left sided deficits, HTN, DM2, vertigo, HLD, Mobitz II s/pp Medtronic dual chamber ICD (12/21/22) initially presented to [facility] from home with complaints of dyspnea and chest pain and was admitted w/ acute hypoxic respiratory failure likely due to acute pulmonary edema due to acute HFrEF in setting of acute NSTEMI, LAURA and enterovirus infection. Pt with brief MICU stay at [facility] requiring bipap (no intubation), was treated for PNA with cefepime, and was found to have TTE done 9/26/23 showing EF 20% with severely decreased LVSF, multiple regional wall motion abnormalities, and elevated LV end diastolic pressure. Also noted to have troponemia [place] [id number] while at [facility] c/f ischemic insult. Pt was transferred to [facility] for cardiac cath evaluation.     On arrival to [facility], patient without any acute complaints, complaining of intermittent palpitations for the last 2 days. No chest pain, SOB, fevers, nausea, vomiting, abdominal pain. EKG showed sinus tachy to 130 w/ TWI in V1-3. Underwent LHC on 10/16 with 3v disease (95% pLAD, 80-90% pLCX, severe RCA) for which CTS was consulted for CABG eval. cMRI revealed limited LAD viability. RHC on 10/19 for hemodynamic assessment showed RA 10, PCWP 20, CO 4.4, CI 2.3. Admitted to CICU on 10/20 for CABG vs. Advanced therapy vs. high risk PCI evaluation.      Past Medical History:     Past Surgical History:     Home Medications:  alfuzosin 10 mg oral tablet, extended release: 1 tab(s) orally once a day (at bedtime) (16 Oct 2023 12:22)  amLODIPine 10 mg oral tablet: 1 tab(s) orally once a day (16 Oct 2023 12:22)  aspirin 81 mg oral tablet: 1 tab(s) orally once a day (16 Oct 2023 12:22)  atorvastatin 80 mg oral tablet: 1 tab(s) orally once a day (16 Oct 2023 12:22)  ertugliflozin 15 mg oral tablet: 1 tab(s) orally once a day (16 Oct 2023 12:22)  finasteride 5 mg oral tablet: 1 tab(s) orally once a day (16 Oct 2023 12:22)  Fish oil: 1 capsule by mouth once a day (16 Oct 2023 12:22)  glipiZIDE 5 mg oral tablet, extended release: 1 tab(s) orally once a day (16 Oct 2023 12:22)  losartan 100 mg oral tablet: 1 tab(s) orally once a day (16 Oct 2023 12:22)  metFORMIN 1000 mg oral tablet: 1 tab(s) orally 2 times a day (16 Oct 2023 12:22)      Current Medications:   MEDICATIONS  (STANDING):  aspirin enteric coated 81 milliGRAM(s) Oral daily  atorvastatin 80 milliGRAM(s) Oral at bedtime  buMETAnide IVPB 4 milliGRAM(s) IV Intermittent two times a day  dextrose 5%. 1000 milliLiter(s) (100 mL/Hr) IV Continuous <Continuous>  dextrose 5%. 1000 milliLiter(s) (50 mL/Hr) IV Continuous <Continuous>  dextrose 50% Injectable 12.5 Gram(s) IV Push once  dextrose 50% Injectable 25 Gram(s) IV Push once  dextrose 50% Injectable 25 Gram(s) IV Push once  glucagon  Injectable 1 milliGRAM(s) IntraMuscular once  heparin   Injectable 5000 Unit(s) SubCutaneous every 8 hours  hydrALAZINE 25 milliGRAM(s) Oral every 8 hours  insulin glargine Injectable (LANTUS) 30 Unit(s) SubCutaneous at bedtime  insulin lispro (ADMELOG) corrective regimen sliding scale   SubCutaneous three times a day before meals  insulin lispro (ADMELOG) corrective regimen sliding scale   SubCutaneous at bedtime  insulin lispro Injectable (ADMELOG) 10 Unit(s) SubCutaneous three times a day before meals  pantoprazole    Tablet 40 milliGRAM(s) Oral before breakfast  senna 2 Tablet(s) Oral at bedtime  tamsulosin 0.4 milliGRAM(s) Oral at bedtime    MEDICATIONS  (PRN):  acetaminophen     Tablet .. 650 milliGRAM(s) Oral every 6 hours PRN Temp greater or equal to 38C (100.4F), Mild Pain (1 - 3)  albuterol    90 MICROgram(s) HFA Inhaler 2 Puff(s) Inhalation every 6 hours PRN Shortness of Breath and/or Wheezing  benzocaine/menthol Lozenge 1 Lozenge Oral every 2 hours PRN Sore Throat  dextrose Oral Gel 15 Gram(s) Oral once PRN Blood Glucose LESS THAN 70 milliGRAM(s)/deciliter  guaiFENesin Oral Liquid (Sugar-Free) 100 milliGRAM(s) Oral every 6 hours PRN Cough  polyethylene glycol 3350 17 Gram(s) Oral daily PRN Constipation      Allergies:     Family History:     Social History:     ====================  Vital Signs Last 24 Hrs  T(C): 36.3 (20 Oct 2023 11:28), Max: 36.9 (19 Oct 2023 16:05)  T(F): 97.3 (20 Oct 2023 11:28), Max: 98.4 (19 Oct 2023 16:05)  HR: 122 (20 Oct 2023 11:28) (119 - 147)  BP: 117/84 (20 Oct 2023 11:28) (97/69 - 125/84)  BP(mean): --  RR: 18 (20 Oct 2023 11:28) (16 - 20)  SpO2: 91% (20 Oct 2023 11:28) (91% - 95%)    Parameters below as of 20 Oct 2023 11:28  Patient On (Oxygen Delivery Method): nasal cannula  O2 Flow (L/min): 2      Adult Advanced Hemodynamics Last 24 Hrs  CVP(mm Hg): --  CVP(cm H2O): --  CO: --  CI: --  PA: --  PA(mean): --  PCWP: --  SVR: --  SVRI: --  PVR: --  PVRI: --    Physical Exam:   General: NAD  HEENT: PERRL, EOMI, normal sclera and conjunctiva, no oral lesions  Neck: Supple, no JVD  Lungs: CTA bilaterally  Heart: RRR, normal S1S2, no murmurs/rubs/gallops  Abdomen: Soft, ND/NT  Extremities: 2+ peripheral pulses, no cyanosis/clubbing/edema, full ROM  Skin: Warm, well-perfused  Neuro: A&O x3, no focal deficits      ====================  Labs & Imaging:   CBC Full  -  ( 20 Oct 2023 12:32 )  WBC Count : 10.61 K/uL  RBC Count : 4.53 M/uL  Hemoglobin : 11.7 g/dL  Hematocrit : 35.8 %  Platelet Count - Automated : 305 K/uL  Mean Cell Volume : 79.0 fl  Mean Cell Hemoglobin : 25.8 pg  Mean Cell Hemoglobin Concentration : 32.7 gm/dL  Auto Neutrophil # : x  Auto Lymphocyte # : x  Auto Monocyte # : x  Auto Eosinophil # : x  Auto Basophil # : x  Auto Neutrophil % : x  Auto Lymphocyte % : x  Auto Monocyte % : x  Auto Eosinophil % : x  Auto Basophil % : x    10-20    133<L>  |  100  |  31<H>  ----------------------------<  193<H>  4.6   |  15<L>  |  1.66<H>    Ca    9.1      20 Oct 2023 12:32    TPro  6.9  /  Alb  3.4  /  TBili  0.4  /  DBili  0.1  /  AST  50<H>  /  ALT  34  /  AlkPhos  78  10-20    PTT - ( 19 Oct 2023 06:22 )  PTT:75.8 sec          Urinalysis Basic - ( 20 Oct 2023 12:32 )    Color: x / Appearance: x / SG: x / pH: x  Gluc: 193 mg/dL / Ketone: x  / Bili: x / Urobili: x   Blood: x / Protein: x / Nitrite: x   Leuk Esterase: x / RBC: x / WBC x   Sq Epi: x / Non Sq Epi: x / Bacteria: x                    ====================  Assessment & Plan:   59 yo Male pmhx CVA with mild left sided deficits, HTN, DM2, vertigo, HLD, Mobitz II s/pp Medtronic dual chamber ICD (12/21/22) initially presented to OSH for SOB c/f ADHF vs. PNA, later found to have NSTEMI transferred to [facility] for LHC evaluation. s/p LHC on 10/16 w/ 3v disease and RHC on 10/19 for hemodynamic assessment. Admitted to CICU for CABG eval vs. advanced therapy vs. high risk PCI    ======NEURO======  #Mental status  - baseline AAOx3, currently at baseline    #Hx of CVA w/ mild L residual deficit  - Stable  - Neuro checks    ======CV======  #CAD  #HFrEF  Likely iso ischemic insult  ECHO 10/16: EF 20%, severely reduced LVSF  LHC 10/16: 95% pLAD, 80% mid and distal LCx, 90% mid and distal RCA  RHC 10/19: RA 10, PCWP 20, CO 4.4, CI 2.3  CMR 10/18: limited viability in LAD territory  - f/u HF, IC, CTS recs  - Advance therapy vs. CABG vs. high risk PCI   - c/w ASA 81, Atorv 80mg  - Start GDMT when appropriate    #Sinus tachycardia  #Mobitz II s/p AICD  - Sinus tach likely iso NSTEMI vs. hypovolemia    #HLD  #HTN  - c/w Hydralazine 25 q8h  - c/w Atorv 80mg     ======PULM======  #Pulmonary edema  - Oxygen therapy PRN keep O2%>92%  - Bumex PRN  - Albuterol PRN    ======RENAL======  #LAURA:   sCr on admission 1.28, uptrending likely iso NSTEMI and HFrEF  - Bumex PRN  - Monitor I/O  - Trend sCr  - Renally dose meds, avoid nephrotoxins    #Hyponatremia  - mild hypoNa to 133 noted on 10/20, asymptomatic  - likely iso CHF +/- LAURA  - ctm    #Elevated lactate  - uptrending lactate noted likely iso NSTEMI  - monitr VS  - trend lactate    #BPH  - c/w Flomax 0.4mg qhs    ======GI======  - No active issues  - CC DASH Halal diet  - GI ppx: PPI 40mg daily  - c/w Bowel regimen    ======ENDO======  #DM2  - A1c 10/14 8.4%  - c/w Lantus 30u qhs + lispro 10u premeal + ISS  - FS    ======HEMATOLOGIC======  - No active issues  - DVT ppx: HSQ iso LAURA    ======ID======  - No active issues  - Recent admission to OSH for ?PNA s/p 14 days of cefepime (last dose 10/14)  - Currently no signs of infection, WBC 10.6, afebrile, no indications for abx CCU Accept Note    BRANDEN MARRUFO  58y  Patient is a 58y old  Male who presents with a chief complaint of NSTEMI (20 Oct 2023 10:51)      ====================  HPI & Hospital Course:   57 yo Male pmhx CVA with mild left sided deficits, HTN, DM2, vertigo, HLD, Mobitz II s/pp Medtronic dual chamber ICD (12/21/22) initially presented to [facility] from home with complaints of dyspnea and chest pain and was admitted w/ acute hypoxic respiratory failure likely due to acute pulmonary edema due to acute HFrEF in setting of acute NSTEMI, LAURA and enterovirus infection. Pt with brief MICU stay at [facility] requiring bipap (no intubation), was treated for PNA with cefepime, and was found to have TTE done 9/26/23 showing EF 20% with severely decreased LVSF, multiple regional wall motion abnormalities, and elevated LV end diastolic pressure. Also noted to have [place]onemia [place] [address] while at [facility] c/f ischemic insult. Pt was transferred to [facility] for cardiac cath evaluation.     On arrival to [facility], patient without any acute complaints, complaining of intermittent palpitations for the last 2 days. No chest pain, SOB, fevers, nausea, vomiting, abdominal pain. EKG showed sinus tachy to 130 w/ TWI in V1-3. Underwent LHC on 10/16 with 3v disease (95% pLAD, 80-90% pLCX, severe RCA) for which CTS was consulted for CABG eval. cMRI revealed limited LAD viability. RHC on 10/19 for hemodynamic assessment showed RA 10, PCWP 20, CO 4.4, CI 2.3. Admitted to CICU on 10/20 for CABG vs. Advanced therapy vs. high risk PCI evaluation.      Past Medical History:     Past Surgical History:     Home Medications:  alfuzosin 10 mg oral tablet, extended release: 1 tab(s) orally once a day (at bedtime) (16 Oct 2023 12:22)  amLODIPine 10 mg oral tablet: 1 tab(s) orally once a day (16 Oct 2023 12:22)  aspirin 81 mg oral tablet: 1 tab(s) orally once a day (16 Oct 2023 12:22)  atorvastatin 80 mg oral tablet: 1 tab(s) orally once a day (16 Oct 2023 12:22)  ertugliflozin 15 mg oral tablet: 1 tab(s) orally once a day (16 Oct 2023 12:22)  finasteride 5 mg oral tablet: 1 tab(s) orally once a day (16 Oct 2023 12:22)  Fish oil: 1 capsule by mouth once a day (16 Oct 2023 12:22)  glipiZIDE 5 mg oral tablet, extended release: 1 tab(s) orally once a day (16 Oct 2023 12:22)  losartan 100 mg oral tablet: 1 tab(s) orally once a day (16 Oct 2023 12:22)  metFORMIN 1000 mg oral tablet: 1 tab(s) orally 2 times a day (16 Oct 2023 12:22)      Current Medications:   MEDICATIONS  (STANDING):  aspirin enteric coated 81 milliGRAM(s) Oral daily  atorvastatin 80 milliGRAM(s) Oral at bedtime  buMETAnide IVPB 4 milliGRAM(s) IV Intermittent two times a day  dextrose 5%. 1000 milliLiter(s) (100 mL/Hr) IV Continuous <Continuous>  dextrose 5%. 1000 milliLiter(s) (50 mL/Hr) IV Continuous <Continuous>  dextrose 50% Injectable 12.5 Gram(s) IV Push once  dextrose 50% Injectable 25 Gram(s) IV Push once  dextrose 50% Injectable 25 Gram(s) IV Push once  glucagon  Injectable 1 milliGRAM(s) IntraMuscular once  heparin   Injectable 5000 Unit(s) SubCutaneous every 8 hours  hydrALAZINE 25 milliGRAM(s) Oral every 8 hours  insulin glargine Injectable (LANTUS) 30 Unit(s) SubCutaneous at bedtime  insulin lispro (ADMELOG) corrective regimen sliding scale   SubCutaneous three times a day before meals  insulin lispro (ADMELOG) corrective regimen sliding scale   SubCutaneous at bedtime  insulin lispro Injectable (ADMELOG) 10 Unit(s) SubCutaneous three times a day before meals  pantoprazole    Tablet 40 milliGRAM(s) Oral before breakfast  senna 2 Tablet(s) Oral at bedtime  tamsulosin 0.4 milliGRAM(s) Oral at bedtime    MEDICATIONS  (PRN):  acetaminophen     Tablet .. 650 milliGRAM(s) Oral every 6 hours PRN Temp greater or equal to 38C (100.4F), Mild Pain (1 - 3)  albuterol    90 MICROgram(s) HFA Inhaler 2 Puff(s) Inhalation every 6 hours PRN Shortness of Breath and/or Wheezing  benzocaine/menthol Lozenge 1 Lozenge Oral every 2 hours PRN Sore Throat  dextrose Oral Gel 15 Gram(s) Oral once PRN Blood Glucose LESS THAN 70 milliGRAM(s)/deciliter  guaiFENesin Oral Liquid (Sugar-Free) 100 milliGRAM(s) Oral every 6 hours PRN Cough  polyethylene glycol 3350 17 Gram(s) Oral daily PRN Constipation      Allergies:     Family History:     Social History:     ====================  Vital Signs Last 24 Hrs  T(C): 36.3 (20 Oct 2023 11:28), Max: 36.9 (19 Oct 2023 16:05)  T(F): 97.3 (20 Oct 2023 11:28), Max: 98.4 (19 Oct 2023 16:05)  HR: 122 (20 Oct 2023 11:28) (119 - 147)  BP: 117/84 (20 Oct 2023 11:28) (97/69 - 125/84)  BP(mean): --  RR: 18 (20 Oct 2023 11:28) (16 - 20)  SpO2: 91% (20 Oct 2023 11:28) (91% - 95%)    Parameters below as of 20 Oct 2023 11:28  Patient On (Oxygen Delivery Method): nasal cannula  O2 Flow (L/min): 2      Adult Advanced Hemodynamics Last 24 Hrs  CVP(mm Hg): --  CVP(cm H2O): --  CO: --  CI: --  PA: --  PA(mean): --  PCWP: --  SVR: --  SVRI: --  PVR: --  PVRI: --    Physical Exam:   General: NAD  HEENT: PERRL, EOMI, normal sclera and conjunctiva, no oral lesions  Neck: Supple, no JVD  Lungs: CTA bilaterally  Heart: RRR, normal S1S2, no murmurs/rubs/gallops  Abdomen: Soft, ND/NT  Extremities: 2+ peripheral pulses, no cyanosis/clubbing/edema, full ROM  Skin: Warm, well-perfused  Neuro: A&O x3, no focal deficits      ====================  Labs & Imaging:   CBC Full  -  ( 20 Oct 2023 12:32 )  WBC Count : 10.61 K/uL  RBC Count : 4.53 M/uL  Hemoglobin : 11.7 g/dL  Hematocrit : 35.8 %  Platelet Count - Automated : 305 K/uL  Mean Cell Volume : 79.0 fl  Mean Cell Hemoglobin : 25.8 pg  Mean Cell Hemoglobin Concentration : 32.7 gm/dL  Auto Neutrophil # : x  Auto Lymphocyte # : x  Auto Monocyte # : x  Auto Eosinophil # : x  Auto Basophil # : x  Auto Neutrophil % : x  Auto Lymphocyte % : x  Auto Monocyte % : x  Auto Eosinophil % : x  Auto Basophil % : x    10-20    133<L>  |  100  |  31<H>  ----------------------------<  193<H>  4.6   |  15<L>  |  1.66<H>    Ca    9.1      20 Oct 2023 12:32    TPro  6.9  /  Alb  3.4  /  TBili  0.4  /  DBili  0.1  /  AST  50<H>  /  ALT  34  /  AlkPhos  78  10-20    PTT - ( 19 Oct 2023 06:22 )  PTT:75.8 sec          Urinalysis Basic - ( 20 Oct 2023 12:32 )    Color: x / Appearance: x / SG: x / pH: x  Gluc: 193 mg/dL / Ketone: x  / Bili: x / Urobili: x   Blood: x / Protein: x / Nitrite: x   Leuk Esterase: x / RBC: x / WBC x   Sq Epi: x / Non Sq Epi: x / Bacteria: x                    ====================  Assessment & Plan:   57 yo Male pmhx CVA with mild left sided deficits, HTN, DM2, vertigo, HLD, Mobitz II s/pp Medtronic dual chamber ICD (12/21/22) initially presented to OSH for SOB c/f ADHF vs. PNA, later found to have NSTEMI transferred to [facility] for LHC evaluation. s/p LHC on 10/16 w/ 3v disease and RHC on 10/19 for hemodynamic assessment. Admitted to CICU for CABG eval vs. advanced therapy vs. high risk PCI    ======NEURO======  #Mental status  - baseline AAOx3, currently at baseline    #Hx of CVA w/ mild L residual deficit  - Stable  - Neuro checks    ======CV======  #CAD  #HFrEF  Likely iso ischemic insult  ECHO 10/16: EF 20%, severely reduced LVSF  LHC 10/16: 95% pLAD, 80% mid and distal LCx, 90% mid and distal RCA  RHC 10/19: RA 10, PCWP 20, CO 4.4, CI 2.3  CMR 10/18: limited viability in LAD territory  - f/u HF, IC, CTS recs  - Advance therapy vs. CABG vs. high risk PCI   - c/w ASA 81, Atorv 80mg  - c/w Bumex 4 q12  - Start GDMT when appropriate    #Tachycardia  #Mobitz II s/p AICD  -     #HLD  #HTN  - c/w Hydralazine 25 q8h  - c/w Atorv 80mg     ======PULM======  #Pulmonary edema  - Oxygen therapy PRN keep O2%>92%  - Bumex 4 q12  - Albuterol PRN    ======RENAL======  #LAURA:   sCr on admission 1.28, uptrending likely iso NSTEMI and HFrEF  - Bumex PRN  - Monitor I/O  - Trend sCr  - Renally dose meds, avoid nephrotoxins    #Hyponatremia  - mild hypoNa to 133 noted on 10/20, asymptomatic  - likely iso CHF +/- LAURA  - ctm    #Elevated lactate  - uptrending lactate noted likely iso NSTEMI  - monitr VS  - trend lactate    #BPH  - c/w Flomax 0.4mg qhs    ======GI======  - No active issues  - CC DASH Halal diet  - GI ppx: PPI 40mg daily  - c/w Bowel regimen    ======ENDO======  #DM2  - A1c 10/14 8.4%  - c/w Lantus 30u qhs + lispro 10u premeal + ISS  - FS    ======HEMATOLOGIC======  - No active issues  - DVT ppx: HSQ iso LAURA    ======ID======  - No active issues  - Recent admission to OSH for ?PNA s/p 14 days of cefepime (last dose 10/14)  - Currently no signs of infection, WBC 10.6, afebrile, no indications for abx CCU Accept Note    BRANDEN MARRUFO  58y  Patient is a 58y old  Male who presents with a chief complaint of NSTEMI (20 Oct 2023 10:51)      ====================  HPI & Hospital Course:   57 yo Male pmhx CVA with mild left sided deficits, HTN, DM2, vertigo, HLD, Mobitz II s/pp Medtronic dual chamber ICD (12/21/22) initially presented to [facility] from home with complaints of dyspnea and chest pain and was admitted w/ acute hypoxic respiratory failure likely due to acute pulmonary edema due to acute HFrEF in setting of acute NSTEMI, LAURA and enterovirus infection. Pt with brief MICU stay at [facility] requiring bipap (no intubation), was treated for PNA with cefepime, and was found to have TTE done 9/26/23 showing EF 20% with severely decreased LVSF, multiple regional wall motion abnormalities, and elevated LV end diastolic pressure. Also noted to have troponemia [place] [address] while at [facility] c/f ischemic insult. Pt was transferred to [facility] for cardiac cath evaluation.     On arrival to [facility], patient without any acute complaints, complaining of intermittent palpitations for the last 2 days. No chest pain, SOB, fevers, nausea, vomiting, abdominal pain. EKG showed sinus tachy to 130 w/ TWI in V1-3. Underwent LHC on 10/16 with 3v disease (95% pLAD, 80-90% pLCX, severe RCA) for which CTS was consulted for CABG eval. cMRI revealed limited LAD viability. RHC on 10/19 for hemodynamic assessment showed RA 10, PCWP 20, CO 4.4, CI 2.3. Admitted to CICU on 10/20 for CABG vs. Advanced therapy vs. high risk PCI evaluation.      Past Medical History:     Past Surgical History:     Home Medications:  alfuzosin 10 mg oral tablet, extended release: 1 tab(s) orally once a day (at bedtime) (16 Oct 2023 12:22)  amLODIPine 10 mg oral tablet: 1 tab(s) orally once a day (16 Oct 2023 12:22)  aspirin 81 mg oral tablet: 1 tab(s) orally once a day (16 Oct 2023 12:22)  atorvastatin 80 mg oral tablet: 1 tab(s) orally once a day (16 Oct 2023 12:22)  ertugliflozin 15 mg oral tablet: 1 tab(s) orally once a day (16 Oct 2023 12:22)  finasteride 5 mg oral tablet: 1 tab(s) orally once a day (16 Oct 2023 12:22)  Fish oil: 1 capsule by mouth once a day (16 Oct 2023 12:22)  glipiZIDE 5 mg oral tablet, extended release: 1 tab(s) orally once a day (16 Oct 2023 12:22)  losartan 100 mg oral tablet: 1 tab(s) orally once a day (16 Oct 2023 12:22)  metFORMIN 1000 mg oral tablet: 1 tab(s) orally 2 times a day (16 Oct 2023 12:22)      Current Medications:   MEDICATIONS  (STANDING):  aspirin enteric coated 81 milliGRAM(s) Oral daily  atorvastatin 80 milliGRAM(s) Oral at bedtime  buMETAnide IVPB 4 milliGRAM(s) IV Intermittent two times a day  dextrose 5%. 1000 milliLiter(s) (100 mL/Hr) IV Continuous <Continuous>  dextrose 5%. 1000 milliLiter(s) (50 mL/Hr) IV Continuous <Continuous>  dextrose 50% Injectable 12.5 Gram(s) IV Push once  dextrose 50% Injectable 25 Gram(s) IV Push once  dextrose 50% Injectable 25 Gram(s) IV Push once  glucagon  Injectable 1 milliGRAM(s) IntraMuscular once  heparin   Injectable 5000 Unit(s) SubCutaneous every 8 hours  hydrALAZINE 25 milliGRAM(s) Oral every 8 hours  insulin glargine Injectable (LANTUS) 30 Unit(s) SubCutaneous at bedtime  insulin lispro (ADMELOG) corrective regimen sliding scale   SubCutaneous three times a day before meals  insulin lispro (ADMELOG) corrective regimen sliding scale   SubCutaneous at bedtime  insulin lispro Injectable (ADMELOG) 10 Unit(s) SubCutaneous three times a day before meals  pantoprazole    Tablet 40 milliGRAM(s) Oral before breakfast  senna 2 Tablet(s) Oral at bedtime  tamsulosin 0.4 milliGRAM(s) Oral at bedtime    MEDICATIONS  (PRN):  acetaminophen     Tablet .. 650 milliGRAM(s) Oral every 6 hours PRN Temp greater or equal to 38C (100.4F), Mild Pain (1 - 3)  albuterol    90 MICROgram(s) HFA Inhaler 2 Puff(s) Inhalation every 6 hours PRN Shortness of Breath and/or Wheezing  benzocaine/menthol Lozenge 1 Lozenge Oral every 2 hours PRN Sore Throat  dextrose Oral Gel 15 Gram(s) Oral once PRN Blood Glucose LESS THAN 70 milliGRAM(s)/deciliter  guaiFENesin Oral Liquid (Sugar-Free) 100 milliGRAM(s) Oral every 6 hours PRN Cough  polyethylene glycol 3350 17 Gram(s) Oral daily PRN Constipation      Allergies:     Family History:     Social History:     ====================  Vital Signs Last 24 Hrs  T(C): 36.3 (20 Oct 2023 11:28), Max: 36.9 (19 Oct 2023 16:05)  T(F): 97.3 (20 Oct 2023 11:28), Max: 98.4 (19 Oct 2023 16:05)  HR: 122 (20 Oct 2023 11:28) (119 - 147)  BP: 117/84 (20 Oct 2023 11:28) (97/69 - 125/84)  BP(mean): --  RR: 18 (20 Oct 2023 11:28) (16 - 20)  SpO2: 91% (20 Oct 2023 11:28) (91% - 95%)    Parameters below as of 20 Oct 2023 11:28  Patient On (Oxygen Delivery Method): nasal cannula  O2 Flow (L/min): 2      Adult Advanced Hemodynamics Last 24 Hrs  CVP(mm Hg): --  CVP(cm H2O): --  CO: --  CI: --  PA: --  PA(mean): --  PCWP: --  SVR: --  SVRI: --  PVR: --  PVRI: --    Physical Exam:   General: NAD  HEENT: PERRL, EOMI, normal sclera and conjunctiva, no oral lesions  Neck: Supple, no JVD  Lungs: CTA bilaterally  Heart: RRR, normal S1S2, no murmurs/rubs/gallops  Abdomen: Soft, ND/NT  Extremities: 2+ peripheral pulses, no cyanosis/clubbing/edema, full ROM  Skin: Warm, well-perfused  Neuro: A&O x3, no focal deficits      ====================  Labs & Imaging:   CBC Full  -  ( 20 Oct 2023 12:32 )  WBC Count : 10.61 K/uL  RBC Count : 4.53 M/uL  Hemoglobin : 11.7 g/dL  Hematocrit : 35.8 %  Platelet Count - Automated : 305 K/uL  Mean Cell Volume : 79.0 fl  Mean Cell Hemoglobin : 25.8 pg  Mean Cell Hemoglobin Concentration : 32.7 gm/dL  Auto Neutrophil # : x  Auto Lymphocyte # : x  Auto Monocyte # : x  Auto Eosinophil # : x  Auto Basophil # : x  Auto Neutrophil % : x  Auto Lymphocyte % : x  Auto Monocyte % : x  Auto Eosinophil % : x  Auto Basophil % : x    10-20    133<L>  |  100  |  31<H>  ----------------------------<  193<H>  4.6   |  15<L>  |  1.66<H>    Ca    9.1      20 Oct 2023 12:32    TPro  6.9  /  Alb  3.4  /  TBili  0.4  /  DBili  0.1  /  AST  50<H>  /  ALT  34  /  AlkPhos  78  10-20    PTT - ( 19 Oct 2023 06:22 )  PTT:75.8 sec          Urinalysis Basic - ( 20 Oct 2023 12:32 )    Color: x / Appearance: x / SG: x / pH: x  Gluc: 193 mg/dL / Ketone: x  / Bili: x / Urobili: x   Blood: x / Protein: x / Nitrite: x   Leuk Esterase: x / RBC: x / WBC x   Sq Epi: x / Non Sq Epi: x / Bacteria: x                    ====================  Assessment & Plan:   57 yo Male pmhx CVA with mild left sided deficits, HTN, DM2, vertigo, HLD, Mobitz II s/pp Medtronic dual chamber ICD (12/21/22) initially presented to OSH for SOB c/f ADHF vs. PNA, later found to have NSTEMI transferred to [facility] for LHC evaluation. s/p LHC on 10/16 w/ 3v disease and RHC on 10/19 for hemodynamic assessment. Admitted to CICU for CABG eval vs. advanced therapy vs. high risk PCI    ======NEURO======  #Mental status  - baseline AAOx3, currently at baseline    #Hx of CVA w/ mild L residual deficit  - Stable  - Neuro checks    ======CV======  #CAD  #HFrEF  Likely iso ischemic insult  ECHO 10/16: EF 20%, severely reduced LVSF  LHC 10/16: 95% pLAD, 80% mid and distal LCx, 90% mid and distal RCA  RHC 10/19: RA 10, PCWP 20, CO 4.4, CI 2.3  CMR 10/18: limited viability in LAD territory  - f/u HF, IC, CTS recs  - Advance therapy vs. CABG vs. high risk PCI   - c/w ASA 81, Atorv 80mg  - c/w Bumex 4 q12  - Start GDMT when appropriate    #Tachycardia  #Mobitz II s/p AICD  - Likely reflex tachycardiac iso hypoxia +/- NSTEMI +/- ADHF  - ctm, would avoid BB/CCB given borderline cardiac function    #HLD  #HTN  - c/w Hydralazine 25 q8h  - c/w Atorv 80mg     ======PULM======  #Pulmonary edema  - Oxygen therapy PRN keep O2%>92%  - Bumex 4 q12  - Albuterol PRN    ======RENAL======  #LAURA:   sCr on admission 1.28, uptrending likely iso NSTEMI and HFrEF  - Bumex PRN  - Monitor I/O  - Trend sCr  - Renally dose meds, avoid nephrotoxins    #Hyponatremia  - mild hypoNa to 133 noted on 10/20, asymptomatic  - likely iso CHF +/- LAURA  - ctm    #Elevated lactate  - uptrending lactate noted likely iso NSTEMI  - monitr VS  - trend lactate    #BPH  - c/w Flomax 0.4mg qhs    ======GI======  - No active issues  - CC DASH Halal diet  - GI ppx: PPI 40mg daily  - c/w Bowel regimen    ======ENDO======  #DM2  - A1c 10/14 8.4%  - c/w Lantus 30u qhs + lispro 10u premeal + ISS  - FS    ======HEMATOLOGIC======  - No active issues  - DVT ppx: HSQ iso LAURA    ======ID======  - No active issues  - Recent admission to OSH for ?PNA s/p 14 days of cefepime (last dose 10/14)  - Currently no signs of infection, WBC 10.6, afebrile, no indications for abx CCU Accept Note    BRANDEN MARRUFO  58y  Patient is a 58y old  Male who presents with a chief complaint of NSTEMI (20 Oct 2023 10:51)      ====================  HPI & Hospital Course:   59 yo Male pmhx CVA with mild left sided deficits, HTN, DM2, vertigo, HLD, Mobitz II s/pp Medtronic dual chamber ICD (12/21/22) initially presented to [facility] from home with complaints of dyspnea and chest pain and was admitted w/ acute hypoxic respiratory failure likely due to acute pulmonary edema due to acute HFrEF in setting of acute NSTEMI, LAURA and enterovirus infection. Pt with brief MICU stay at [facility] requiring bipap (no intubation), was treated for PNA with cefepime, and was found to have TTE done 9/26/23 showing EF 20% with severely decreased LVSF, multiple regional wall motion abnormalities, and elevated LV end diastolic pressure. Also noted to have troponemia peaked at [address] while at [facility] c/f ischemic insult. Pt was transferred to [facility] for cardiac cath evaluation.     On arrival to [facility], patient without any acute complaints, complaining of intermittent palpitations for the last 2 days. No chest pain, SOB, fevers, nausea, vomiting, abdominal pain. EKG showed sinus tachy to 130 w/ TWI in V1-3. Underwent LHC on 10/16 with 3v disease (95% pLAD, 80-90% pLCX, severe RCA) for which CTS was consulted for CABG eval. cMRI revealed limited LAD viability. RHC on 10/19 for hemodynamic assessment showed RA 10, PCWP 20, CO 4.4, CI 2.3. On 10/20, noted to be cold, clammy, altered after receiving toporol c/f borderline cardiogenic shock, Admitted to CICU for CABG vs. Advanced therapy vs. high risk PCI evaluation.      Past Medical History:     Past Surgical History:     Home Medications:  alfuzosin 10 mg oral tablet, extended release: 1 tab(s) orally once a day (at bedtime) (16 Oct 2023 12:22)  amLODIPine 10 mg oral tablet: 1 tab(s) orally once a day (16 Oct 2023 12:22)  aspirin 81 mg oral tablet: 1 tab(s) orally once a day (16 Oct 2023 12:22)  atorvastatin 80 mg oral tablet: 1 tab(s) orally once a day (16 Oct 2023 12:22)  ertugliflozin 15 mg oral tablet: 1 tab(s) orally once a day (16 Oct 2023 12:22)  finasteride 5 mg oral tablet: 1 tab(s) orally once a day (16 Oct 2023 12:22)  Fish oil: 1 capsule by mouth once a day (16 Oct 2023 12:22)  glipiZIDE 5 mg oral tablet, extended release: 1 tab(s) orally once a day (16 Oct 2023 12:22)  losartan 100 mg oral tablet: 1 tab(s) orally once a day (16 Oct 2023 12:22)  metFORMIN 1000 mg oral tablet: 1 tab(s) orally 2 times a day (16 Oct 2023 12:22)      Current Medications:   MEDICATIONS  (STANDING):  aspirin enteric coated 81 milliGRAM(s) Oral daily  atorvastatin 80 milliGRAM(s) Oral at bedtime  buMETAnide IVPB 4 milliGRAM(s) IV Intermittent two times a day  dextrose 5%. 1000 milliLiter(s) (100 mL/Hr) IV Continuous <Continuous>  dextrose 5%. 1000 milliLiter(s) (50 mL/Hr) IV Continuous <Continuous>  dextrose 50% Injectable 12.5 Gram(s) IV Push once  dextrose 50% Injectable 25 Gram(s) IV Push once  dextrose 50% Injectable 25 Gram(s) IV Push once  glucagon  Injectable 1 milliGRAM(s) IntraMuscular once  heparin   Injectable 5000 Unit(s) SubCutaneous every 8 hours  hydrALAZINE 25 milliGRAM(s) Oral every 8 hours  insulin glargine Injectable (LANTUS) 30 Unit(s) SubCutaneous at bedtime  insulin lispro (ADMELOG) corrective regimen sliding scale   SubCutaneous three times a day before meals  insulin lispro (ADMELOG) corrective regimen sliding scale   SubCutaneous at bedtime  insulin lispro Injectable (ADMELOG) 10 Unit(s) SubCutaneous three times a day before meals  pantoprazole    Tablet 40 milliGRAM(s) Oral before breakfast  senna 2 Tablet(s) Oral at bedtime  tamsulosin 0.4 milliGRAM(s) Oral at bedtime    MEDICATIONS  (PRN):  acetaminophen     Tablet .. 650 milliGRAM(s) Oral every 6 hours PRN Temp greater or equal to 38C (100.4F), Mild Pain (1 - 3)  albuterol    90 MICROgram(s) HFA Inhaler 2 Puff(s) Inhalation every 6 hours PRN Shortness of Breath and/or Wheezing  benzocaine/menthol Lozenge 1 Lozenge Oral every 2 hours PRN Sore Throat  dextrose Oral Gel 15 Gram(s) Oral once PRN Blood Glucose LESS THAN 70 milliGRAM(s)/deciliter  guaiFENesin Oral Liquid (Sugar-Free) 100 milliGRAM(s) Oral every 6 hours PRN Cough  polyethylene glycol 3350 17 Gram(s) Oral daily PRN Constipation      Allergies:     Family History:     Social History:     ====================  Vital Signs Last 24 Hrs  T(C): 36.3 (20 Oct 2023 11:28), Max: 36.9 (19 Oct 2023 16:05)  T(F): 97.3 (20 Oct 2023 11:28), Max: 98.4 (19 Oct 2023 16:05)  HR: 122 (20 Oct 2023 11:28) (119 - 147)  BP: 117/84 (20 Oct 2023 11:28) (97/69 - 125/84)  BP(mean): --  RR: 18 (20 Oct 2023 11:28) (16 - 20)  SpO2: 91% (20 Oct 2023 11:28) (91% - 95%)    Parameters below as of 20 Oct 2023 11:28  Patient On (Oxygen Delivery Method): nasal cannula  O2 Flow (L/min): 2      Adult Advanced Hemodynamics Last 24 Hrs  CVP(mm Hg): --  CVP(cm H2O): --  CO: --  CI: --  PA: --  PA(mean): --  PCWP: --  SVR: --  SVRI: --  PVR: --  PVRI: --    Physical Exam:   General: NAD  HEENT: PERRL, EOMI, normal sclera and conjunctiva, no oral lesions  Neck: Supple, no JVD  Lungs: CTA bilaterally  Heart: RRR, normal S1S2, no murmurs/rubs/gallops  Abdomen: Soft, ND/NT  Extremities: 2+ peripheral pulses, no cyanosis/clubbing/edema, full ROM  Skin: Warm, well-perfused  Neuro: A&O x3, no focal deficits      ====================  Labs & Imaging:   CBC Full  -  ( 20 Oct 2023 12:32 )  WBC Count : 10.61 K/uL  RBC Count : 4.53 M/uL  Hemoglobin : 11.7 g/dL  Hematocrit : 35.8 %  Platelet Count - Automated : 305 K/uL  Mean Cell Volume : 79.0 fl  Mean Cell Hemoglobin : 25.8 pg  Mean Cell Hemoglobin Concentration : 32.7 gm/dL  Auto Neutrophil # : x  Auto Lymphocyte # : x  Auto Monocyte # : x  Auto Eosinophil # : x  Auto Basophil # : x  Auto Neutrophil % : x  Auto Lymphocyte % : x  Auto Monocyte % : x  Auto Eosinophil % : x  Auto Basophil % : x    10-20    133<L>  |  100  |  31<H>  ----------------------------<  193<H>  4.6   |  15<L>  |  1.66<H>    Ca    9.1      20 Oct 2023 12:32    TPro  6.9  /  Alb  3.4  /  TBili  0.4  /  DBili  0.1  /  AST  50<H>  /  ALT  34  /  AlkPhos  78  10-20    PTT - ( 19 Oct 2023 06:22 )  PTT:75.8 sec          Urinalysis Basic - ( 20 Oct 2023 12:32 )    Color: x / Appearance: x / SG: x / pH: x  Gluc: 193 mg/dL / Ketone: x  / Bili: x / Urobili: x   Blood: x / Protein: x / Nitrite: x   Leuk Esterase: x / RBC: x / WBC x   Sq Epi: x / Non Sq Epi: x / Bacteria: x                    ====================  Assessment & Plan:   59 yo Male pmhx CVA with mild left sided deficits, HTN, DM2, vertigo, HLD, Mobitz II s/pp Medtronic dual chamber ICD (12/21/22) initially presented to OSH for SOB c/f ADHF vs. PNA, later found to have NSTEMI transferred to [facility] for LHC evaluation. s/p LHC on 10/16 w/ 3v disease and RHC on 10/19 for hemodynamic assessment. Admitted to CICU for CABG eval vs. advanced therapy vs. high risk PCI    ======NEURO======  #Mental status  - baseline AAOx3, currently at baseline    #Hx of CVA w/ mild L residual deficit  - Stable  - Neuro checks    ======CV======  #CAD  #HFrEF  Likely iso ischemic insult  ECHO 10/16: EF 20%, severely reduced LVSF  LHC 10/16: 95% pLAD, 80% mid and distal LCx, 90% mid and distal RCA  RHC 10/19: RA 10, PCWP 20, CO 4.4, CI 2.3  CMR 10/18: limited viability in LAD territory  - f/u HF, IC, CTS recs  - Advance therapy vs. CABG vs. high risk PCI   - c/w ASA 81, Atorv 80mg  - c/w Bumex 4 q12  - Start GDMT when appropriate    #Tachycardia  #Mobitz II s/p AICD  - Likely reflex tachycardiac iso hypoxia +/- NSTEMI +/- ADHF  - ctm, would avoid BB/CCB given borderline cardiac function    #HLD  #HTN  - c/w Hydralazine 25 q8h  - c/w Atorv 80mg     ======PULM======  #Pulmonary edema  - Oxygen therapy PRN keep O2%>92%  - Bumex 4 q12  - Albuterol PRN    ======RENAL======  #LAURA:   sCr on admission 1.28, uptrending likely iso NSTEMI and HFrEF  - Bumex PRN  - Monitor I/O  - Trend sCr  - Renally dose meds, avoid nephrotoxins    #Hyponatremia  - mild hypoNa to 133 noted on 10/20, asymptomatic  - likely iso CHF +/- LAURA  - ctm    #Elevated lactate  - uptrending lactate noted likely iso NSTEMI  - monitr VS  - trend lactate    #BPH  - c/w Flomax 0.4mg qhs    ======GI======  - No active issues  - CC DASH Halal diet  - GI ppx: PPI 40mg daily  - c/w Bowel regimen    ======ENDO======  #DM2  - A1c 10/14 8.4%  - c/w Lantus 30u qhs + lispro 10u premeal + ISS  - FS    ======HEMATOLOGIC======  - No active issues  - DVT ppx: HSQ iso LAURA    ======ID======  - No active issues  - Recent admission to OSH for ?PNA s/p 14 days of cefepime (last dose 10/14)  - Currently no signs of infection, WBC 10.6, afebrile, no indications for abx CCU Accept Note    BRANDEN MARRUFO  58y  Patient is a 58y old  Male who presents with a chief complaint of NSTEMI (20 Oct 2023 10:51)      ====================  HPI & Hospital Course:   59 yo Male pmhx CVA with mild left sided deficits, HTN, DM2, vertigo, HLD, Mobitz II s/pp Medtronic dual chamber ICD (12/21/22) initially presented to [facility] from home with complaints of dyspnea and chest pain and was admitted w/ acute hypoxic respiratory failure likely due to acute pulmonary edema due to acute HFrEF in setting of acute NSTEMI, LAURA and enterovirus infection. Pt with brief MICU stay at [facility] requiring bipap (no intubation), was treated for PNA with cefepime, and was found to have TTE done 9/26/23 showing EF 20% with severely decreased LVSF, multiple regional wall motion abnormalities, and elevated LV end diastolic pressure. Also noted to have troponemia peaked [place] [address] while at [facility] c/f ischemic insult. Pt was transferred to [facility] for cardiac cath evaluation.     On arrival to [facility], patient without any acute complaints, complaining of intermittent palpitations for the last 2 days. No chest pain, SOB, fevers, nausea, vomiting, abdominal pain. EKG showed sinus tachy to 130 w/ TWI in V1-3. Underwent LHC on 10/16 with 3v disease (95% pLAD, 80-90% pLCX, severe RCA) for which CTS was consulted for CABG eval. cMRI revealed limited LAD viability. RHC on 10/19 for hemodynamic assessment showed RA 10, PCWP 20, CO 4.4, CI 2.3. On 10/20, noted to be cold, clammy, altered after receiving toporol c/f borderline cardiogenic shock, Admitted to CICU for CABG vs. Advanced therapy vs. high risk PCI evaluation.      Past Medical History:     Past Surgical History:     Home Medications:  alfuzosin 10 mg oral tablet, extended release: 1 tab(s) orally once a day (at bedtime) (16 Oct 2023 12:22)  amLODIPine 10 mg oral tablet: 1 tab(s) orally once a day (16 Oct 2023 12:22)  aspirin 81 mg oral tablet: 1 tab(s) orally once a day (16 Oct 2023 12:22)  atorvastatin 80 mg oral tablet: 1 tab(s) orally once a day (16 Oct 2023 12:22)  ertugliflozin 15 mg oral tablet: 1 tab(s) orally once a day (16 Oct 2023 12:22)  finasteride 5 mg oral tablet: 1 tab(s) orally once a day (16 Oct 2023 12:22)  Fish oil: 1 capsule by mouth once a day (16 Oct 2023 12:22)  glipiZIDE 5 mg oral tablet, extended release: 1 tab(s) orally once a day (16 Oct 2023 12:22)  losartan 100 mg oral tablet: 1 tab(s) orally once a day (16 Oct 2023 12:22)  metFORMIN 1000 mg oral tablet: 1 tab(s) orally 2 times a day (16 Oct 2023 12:22)      Current Medications:   MEDICATIONS  (STANDING):  aspirin enteric coated 81 milliGRAM(s) Oral daily  atorvastatin 80 milliGRAM(s) Oral at bedtime  buMETAnide IVPB 4 milliGRAM(s) IV Intermittent two times a day  dextrose 5%. 1000 milliLiter(s) (100 mL/Hr) IV Continuous <Continuous>  dextrose 5%. 1000 milliLiter(s) (50 mL/Hr) IV Continuous <Continuous>  dextrose 50% Injectable 12.5 Gram(s) IV Push once  dextrose 50% Injectable 25 Gram(s) IV Push once  dextrose 50% Injectable 25 Gram(s) IV Push once  glucagon  Injectable 1 milliGRAM(s) IntraMuscular once  heparin   Injectable 5000 Unit(s) SubCutaneous every 8 hours  hydrALAZINE 25 milliGRAM(s) Oral every 8 hours  insulin glargine Injectable (LANTUS) 30 Unit(s) SubCutaneous at bedtime  insulin lispro (ADMELOG) corrective regimen sliding scale   SubCutaneous three times a day before meals  insulin lispro (ADMELOG) corrective regimen sliding scale   SubCutaneous at bedtime  insulin lispro Injectable (ADMELOG) 10 Unit(s) SubCutaneous three times a day before meals  pantoprazole    Tablet 40 milliGRAM(s) Oral before breakfast  senna 2 Tablet(s) Oral at bedtime  tamsulosin 0.4 milliGRAM(s) Oral at bedtime    MEDICATIONS  (PRN):  acetaminophen     Tablet .. 650 milliGRAM(s) Oral every 6 hours PRN Temp greater or equal to 38C (100.4F), Mild Pain (1 - 3)  albuterol    90 MICROgram(s) HFA Inhaler 2 Puff(s) Inhalation every 6 hours PRN Shortness of Breath and/or Wheezing  benzocaine/menthol Lozenge 1 Lozenge Oral every 2 hours PRN Sore Throat  dextrose Oral Gel 15 Gram(s) Oral once PRN Blood Glucose LESS THAN 70 milliGRAM(s)/deciliter  guaiFENesin Oral Liquid (Sugar-Free) 100 milliGRAM(s) Oral every 6 hours PRN Cough  polyethylene glycol 3350 17 Gram(s) Oral daily PRN Constipation      Allergies:     Family History:     Social History:     ====================  Vital Signs Last 24 Hrs  T(C): 36.3 (20 Oct 2023 11:28), Max: 36.9 (19 Oct 2023 16:05)  T(F): 97.3 (20 Oct 2023 11:28), Max: 98.4 (19 Oct 2023 16:05)  HR: 122 (20 Oct 2023 11:28) (119 - 147)  BP: 117/84 (20 Oct 2023 11:28) (97/69 - 125/84)  BP(mean): --  RR: 18 (20 Oct 2023 11:28) (16 - 20)  SpO2: 91% (20 Oct 2023 11:28) (91% - 95%)    Parameters below as of 20 Oct 2023 11:28  Patient On (Oxygen Delivery Method): nasal cannula  O2 Flow (L/min): 2      Adult Advanced Hemodynamics Last 24 Hrs  CVP(mm Hg): --  CVP(cm H2O): --  CO: --  CI: --  PA: --  PA(mean): --  PCWP: --  SVR: --  SVRI: --  PVR: --  PVRI: --    Physical Exam:   General: NAD  HEENT: PERRL, EOMI, normal sclera and conjunctiva, no oral lesions  Neck: Supple, no JVD  Lungs: CTA bilaterally  Heart: RRR, normal S1S2, no murmurs/rubs/gallops  Abdomen: Soft, ND/NT  Extremities: 2+ peripheral pulses, no cyanosis/clubbing/edema, full ROM  Skin: Warm, well-perfused  Neuro: A&O x3, no focal deficits      ====================  Labs & Imaging:   CBC Full  -  ( 20 Oct 2023 12:32 )  WBC Count : 10.61 K/uL  RBC Count : 4.53 M/uL  Hemoglobin : 11.7 g/dL  Hematocrit : 35.8 %  Platelet Count - Automated : 305 K/uL  Mean Cell Volume : 79.0 fl  Mean Cell Hemoglobin : 25.8 pg  Mean Cell Hemoglobin Concentration : 32.7 gm/dL  Auto Neutrophil # : x  Auto Lymphocyte # : x  Auto Monocyte # : x  Auto Eosinophil # : x  Auto Basophil # : x  Auto Neutrophil % : x  Auto Lymphocyte % : x  Auto Monocyte % : x  Auto Eosinophil % : x  Auto Basophil % : x    10-20    133<L>  |  100  |  31<H>  ----------------------------<  193<H>  4.6   |  15<L>  |  1.66<H>    Ca    9.1      20 Oct 2023 12:32    TPro  6.9  /  Alb  3.4  /  TBili  0.4  /  DBili  0.1  /  AST  50<H>  /  ALT  34  /  AlkPhos  78  10-20    PTT - ( 19 Oct 2023 06:22 )  PTT:75.8 sec          Urinalysis Basic - ( 20 Oct 2023 12:32 )    Color: x / Appearance: x / SG: x / pH: x  Gluc: 193 mg/dL / Ketone: x  / Bili: x / Urobili: x   Blood: x / Protein: x / Nitrite: x   Leuk Esterase: x / RBC: x / WBC x   Sq Epi: x / Non Sq Epi: x / Bacteria: x                    ====================  Assessment & Plan:   59 yo Male pmhx CVA with mild left sided deficits, HTN, DM2, vertigo, HLD, Mobitz II s/pp Medtronic dual chamber ICD (12/21/22) initially presented to OSH for SOB c/f ADHF vs. PNA, later found to have NSTEMI transferred to [facility] for LHC evaluation. s/p LHC on 10/16 w/ 3v disease and RHC on 10/19 for hemodynamic assessment. Admitted to CICU for CABG eval vs. advanced therapy vs. high risk PCI    ======NEURO======  #Mental status  - baseline AAOx3, currently at baseline    #Hx of CVA w/ mild L residual deficit  - Stable  - Neuro checks    ======CV======  #CAD  #HTN  #HFrEF  Likely iso ischemic insult  ECHO 10/16: EF 20%, severely reduced LVSF  LHC 10/16: 95% pLAD, 80% mid and distal LCx, 90% mid and distal RCA  RHC 10/19: RA 10, PCWP 20, CO 4.4, CI 2.3  CMR 10/18: limited viability in LAD territory  - f/u HF, CTS recs  - Advance therapy vs. CABG vs. high risk PCI   - c/w ASA 81, Atorv 80mg  - c/w Bumex 4 q12  - c/w Hydralazine 25 q8h for AF reduction  - avoid BB/CCB given borderline cardiac function  - Start GDMT when appropriate    #Tachycardia  #Mobitz II s/p AICD  - Likely reflex tachycardiac iso hypoxia +/- NSTEMI +/- ADHF  - ctm    #HLD  - c/w Atorv 80mg     ======PULM======  #Pulmonary edema  - Oxygen therapy PRN keep O2%>92%  - Bumex 4 q12  - Albuterol PRN    ======RENAL======  #LAURA:   sCr on admission 1.28, uptrending likely iso NSTEMI and HFrEF  - Bumex PRN  - Monitor I/O  - Trend sCr  - Renally dose meds, avoid nephrotoxins    #Hyponatremia  - mild hypoNa to 133 noted on 10/20, asymptomatic  - likely iso CHF +/- LAURA  - ctm    #Elevated lactate  - uptrending lactate noted likely iso NSTEMI  - monitr VS  - trend lactate    #BPH  - c/w Flomax 0.4mg qhs    ======GI======  - No active issues  - CC DASH Halal diet  - GI ppx: PPI 40mg daily  - c/w Bowel regimen    ======ENDO======  #DM2  - A1c 10/14 8.4%  - c/w Lantus 30u qhs + lispro 10u premeal + ISS  - FS    ======HEMATOLOGIC======  - No active issues  - DVT ppx: HSQ iso LAURA    ======ID======  - No active issues  - Recent admission to OSH for ?PNA s/p 14 days of cefepime (last dose 10/14)  - Currently no signs of infection, WBC 10.6, afebrile, no indications for abx

## 2023-10-21 LAB
ALBUMIN SERPL ELPH-MCNC: 3.1 G/DL — LOW (ref 3.3–5)
ALBUMIN SERPL ELPH-MCNC: 3.3 G/DL — SIGNIFICANT CHANGE UP (ref 3.3–5)
ALBUMIN SERPL ELPH-MCNC: 3.3 G/DL — SIGNIFICANT CHANGE UP (ref 3.3–5)
ALP SERPL-CCNC: 70 U/L — SIGNIFICANT CHANGE UP (ref 40–120)
ALP SERPL-CCNC: 70 U/L — SIGNIFICANT CHANGE UP (ref 40–120)
ALP SERPL-CCNC: 72 U/L — SIGNIFICANT CHANGE UP (ref 40–120)
ALP SERPL-CCNC: 72 U/L — SIGNIFICANT CHANGE UP (ref 40–120)
ALP SERPL-CCNC: 76 U/L — SIGNIFICANT CHANGE UP (ref 40–120)
ALP SERPL-CCNC: 76 U/L — SIGNIFICANT CHANGE UP (ref 40–120)
ALT FLD-CCNC: 32 U/L — SIGNIFICANT CHANGE UP (ref 10–45)
ALT FLD-CCNC: 32 U/L — SIGNIFICANT CHANGE UP (ref 10–45)
ALT FLD-CCNC: 33 U/L — SIGNIFICANT CHANGE UP (ref 10–45)
ANION GAP SERPL CALC-SCNC: 14 MMOL/L — SIGNIFICANT CHANGE UP (ref 5–17)
ANION GAP SERPL CALC-SCNC: 14 MMOL/L — SIGNIFICANT CHANGE UP (ref 5–17)
ANION GAP SERPL CALC-SCNC: 15 MMOL/L — SIGNIFICANT CHANGE UP (ref 5–17)
ANION GAP SERPL CALC-SCNC: 15 MMOL/L — SIGNIFICANT CHANGE UP (ref 5–17)
ANION GAP SERPL CALC-SCNC: 19 MMOL/L — HIGH (ref 5–17)
ANION GAP SERPL CALC-SCNC: 19 MMOL/L — HIGH (ref 5–17)
APTT BLD: 26.1 SEC — SIGNIFICANT CHANGE UP (ref 24.5–35.6)
APTT BLD: 26.1 SEC — SIGNIFICANT CHANGE UP (ref 24.5–35.6)
AST SERPL-CCNC: 31 U/L — SIGNIFICANT CHANGE UP (ref 10–40)
AST SERPL-CCNC: 31 U/L — SIGNIFICANT CHANGE UP (ref 10–40)
AST SERPL-CCNC: 33 U/L — SIGNIFICANT CHANGE UP (ref 10–40)
AST SERPL-CCNC: 33 U/L — SIGNIFICANT CHANGE UP (ref 10–40)
AST SERPL-CCNC: 40 U/L — SIGNIFICANT CHANGE UP (ref 10–40)
AST SERPL-CCNC: 40 U/L — SIGNIFICANT CHANGE UP (ref 10–40)
BASE EXCESS BLDMV CALC-SCNC: -3.4 MMOL/L — LOW (ref -3–3)
BASE EXCESS BLDMV CALC-SCNC: -3.4 MMOL/L — LOW (ref -3–3)
BASE EXCESS BLDMV CALC-SCNC: -3.8 MMOL/L — LOW (ref -3–3)
BASE EXCESS BLDMV CALC-SCNC: -3.8 MMOL/L — LOW (ref -3–3)
BASE EXCESS BLDMV CALC-SCNC: -4.2 MMOL/L — LOW (ref -3–3)
BASE EXCESS BLDMV CALC-SCNC: -4.2 MMOL/L — LOW (ref -3–3)
BASE EXCESS BLDV CALC-SCNC: -4.1 MMOL/L — LOW (ref -2–3)
BASE EXCESS BLDV CALC-SCNC: -4.1 MMOL/L — LOW (ref -2–3)
BILIRUB SERPL-MCNC: 0.2 MG/DL — SIGNIFICANT CHANGE UP (ref 0.2–1.2)
BILIRUB SERPL-MCNC: 0.2 MG/DL — SIGNIFICANT CHANGE UP (ref 0.2–1.2)
BILIRUB SERPL-MCNC: 0.3 MG/DL — SIGNIFICANT CHANGE UP (ref 0.2–1.2)
BILIRUB SERPL-MCNC: 0.3 MG/DL — SIGNIFICANT CHANGE UP (ref 0.2–1.2)
BILIRUB SERPL-MCNC: 0.4 MG/DL — SIGNIFICANT CHANGE UP (ref 0.2–1.2)
BILIRUB SERPL-MCNC: 0.4 MG/DL — SIGNIFICANT CHANGE UP (ref 0.2–1.2)
BUN SERPL-MCNC: 38 MG/DL — HIGH (ref 7–23)
BUN SERPL-MCNC: 38 MG/DL — HIGH (ref 7–23)
BUN SERPL-MCNC: 41 MG/DL — HIGH (ref 7–23)
BUN SERPL-MCNC: 41 MG/DL — HIGH (ref 7–23)
BUN SERPL-MCNC: 48 MG/DL — HIGH (ref 7–23)
BUN SERPL-MCNC: 48 MG/DL — HIGH (ref 7–23)
CA-I SERPL-SCNC: 1.19 MMOL/L — SIGNIFICANT CHANGE UP (ref 1.15–1.33)
CA-I SERPL-SCNC: 1.19 MMOL/L — SIGNIFICANT CHANGE UP (ref 1.15–1.33)
CALCIUM SERPL-MCNC: 8.6 MG/DL — SIGNIFICANT CHANGE UP (ref 8.4–10.5)
CALCIUM SERPL-MCNC: 8.6 MG/DL — SIGNIFICANT CHANGE UP (ref 8.4–10.5)
CALCIUM SERPL-MCNC: 8.8 MG/DL — SIGNIFICANT CHANGE UP (ref 8.4–10.5)
CALCIUM SERPL-MCNC: 8.8 MG/DL — SIGNIFICANT CHANGE UP (ref 8.4–10.5)
CALCIUM SERPL-MCNC: 9 MG/DL — SIGNIFICANT CHANGE UP (ref 8.4–10.5)
CALCIUM SERPL-MCNC: 9 MG/DL — SIGNIFICANT CHANGE UP (ref 8.4–10.5)
CHLORIDE BLDV-SCNC: 102 MMOL/L — SIGNIFICANT CHANGE UP (ref 96–108)
CHLORIDE BLDV-SCNC: 102 MMOL/L — SIGNIFICANT CHANGE UP (ref 96–108)
CHLORIDE SERPL-SCNC: 101 MMOL/L — SIGNIFICANT CHANGE UP (ref 96–108)
CHLORIDE SERPL-SCNC: 101 MMOL/L — SIGNIFICANT CHANGE UP (ref 96–108)
CHLORIDE SERPL-SCNC: 104 MMOL/L — SIGNIFICANT CHANGE UP (ref 96–108)
CHLORIDE SERPL-SCNC: 104 MMOL/L — SIGNIFICANT CHANGE UP (ref 96–108)
CHLORIDE SERPL-SCNC: 99 MMOL/L — SIGNIFICANT CHANGE UP (ref 96–108)
CHLORIDE SERPL-SCNC: 99 MMOL/L — SIGNIFICANT CHANGE UP (ref 96–108)
CO2 BLDMV-SCNC: 21 MMOL/L — SIGNIFICANT CHANGE UP (ref 21–29)
CO2 BLDMV-SCNC: 21 MMOL/L — SIGNIFICANT CHANGE UP (ref 21–29)
CO2 BLDMV-SCNC: 22 MMOL/L — SIGNIFICANT CHANGE UP (ref 21–29)
CO2 BLDV-SCNC: 22 MMOL/L — SIGNIFICANT CHANGE UP (ref 22–26)
CO2 BLDV-SCNC: 22 MMOL/L — SIGNIFICANT CHANGE UP (ref 22–26)
CO2 SERPL-SCNC: 15 MMOL/L — LOW (ref 22–31)
CO2 SERPL-SCNC: 15 MMOL/L — LOW (ref 22–31)
CO2 SERPL-SCNC: 18 MMOL/L — LOW (ref 22–31)
CO2 SERPL-SCNC: 18 MMOL/L — LOW (ref 22–31)
CO2 SERPL-SCNC: 19 MMOL/L — LOW (ref 22–31)
CO2 SERPL-SCNC: 19 MMOL/L — LOW (ref 22–31)
CREAT SERPL-MCNC: 1.87 MG/DL — HIGH (ref 0.5–1.3)
CREAT SERPL-MCNC: 1.87 MG/DL — HIGH (ref 0.5–1.3)
CREAT SERPL-MCNC: 1.9 MG/DL — HIGH (ref 0.5–1.3)
CREAT SERPL-MCNC: 1.9 MG/DL — HIGH (ref 0.5–1.3)
CREAT SERPL-MCNC: 1.98 MG/DL — HIGH (ref 0.5–1.3)
CREAT SERPL-MCNC: 1.98 MG/DL — HIGH (ref 0.5–1.3)
EGFR: 39 ML/MIN/1.73M2 — LOW
EGFR: 39 ML/MIN/1.73M2 — LOW
EGFR: 41 ML/MIN/1.73M2 — LOW
EGFR: 41 ML/MIN/1.73M2 — LOW
EGFR: 42 ML/MIN/1.73M2 — LOW
EGFR: 42 ML/MIN/1.73M2 — LOW
GAS PNL BLDA: SIGNIFICANT CHANGE UP
GAS PNL BLDA: SIGNIFICANT CHANGE UP
GAS PNL BLDMV: SIGNIFICANT CHANGE UP
GAS PNL BLDV: 133 MMOL/L — LOW (ref 136–145)
GAS PNL BLDV: 133 MMOL/L — LOW (ref 136–145)
GAS PNL BLDV: SIGNIFICANT CHANGE UP
GLUCOSE BLDC GLUCOMTR-MCNC: 101 MG/DL — HIGH (ref 70–99)
GLUCOSE BLDC GLUCOMTR-MCNC: 101 MG/DL — HIGH (ref 70–99)
GLUCOSE BLDC GLUCOMTR-MCNC: 120 MG/DL — HIGH (ref 70–99)
GLUCOSE BLDC GLUCOMTR-MCNC: 120 MG/DL — HIGH (ref 70–99)
GLUCOSE BLDC GLUCOMTR-MCNC: 122 MG/DL — HIGH (ref 70–99)
GLUCOSE BLDC GLUCOMTR-MCNC: 122 MG/DL — HIGH (ref 70–99)
GLUCOSE BLDC GLUCOMTR-MCNC: 154 MG/DL — HIGH (ref 70–99)
GLUCOSE BLDC GLUCOMTR-MCNC: 154 MG/DL — HIGH (ref 70–99)
GLUCOSE BLDV-MCNC: 153 MG/DL — HIGH (ref 70–99)
GLUCOSE BLDV-MCNC: 153 MG/DL — HIGH (ref 70–99)
GLUCOSE SERPL-MCNC: 152 MG/DL — HIGH (ref 70–99)
GLUCOSE SERPL-MCNC: 152 MG/DL — HIGH (ref 70–99)
GLUCOSE SERPL-MCNC: 160 MG/DL — HIGH (ref 70–99)
GLUCOSE SERPL-MCNC: 160 MG/DL — HIGH (ref 70–99)
GLUCOSE SERPL-MCNC: 161 MG/DL — HIGH (ref 70–99)
GLUCOSE SERPL-MCNC: 161 MG/DL — HIGH (ref 70–99)
HCO3 BLDMV-SCNC: 20 MMOL/L — SIGNIFICANT CHANGE UP (ref 20–28)
HCO3 BLDMV-SCNC: 20 MMOL/L — SIGNIFICANT CHANGE UP (ref 20–28)
HCO3 BLDMV-SCNC: 21 MMOL/L — SIGNIFICANT CHANGE UP (ref 20–28)
HCO3 BLDV-SCNC: 21 MMOL/L — LOW (ref 22–29)
HCO3 BLDV-SCNC: 21 MMOL/L — LOW (ref 22–29)
HCT VFR BLD CALC: 35.5 % — LOW (ref 39–50)
HCT VFR BLD CALC: 35.5 % — LOW (ref 39–50)
HCT VFR BLDA CALC: 34 % — LOW (ref 39–51)
HCT VFR BLDA CALC: 34 % — LOW (ref 39–51)
HGB BLD CALC-MCNC: 11.4 G/DL — LOW (ref 12.6–17.4)
HGB BLD CALC-MCNC: 11.4 G/DL — LOW (ref 12.6–17.4)
HGB BLD-MCNC: 11.3 G/DL — LOW (ref 13–17)
HGB BLD-MCNC: 11.3 G/DL — LOW (ref 13–17)
HOROWITZ INDEX BLDMV+IHG-RTO: 32 — SIGNIFICANT CHANGE UP
HOROWITZ INDEX BLDMV+IHG-RTO: 32 — SIGNIFICANT CHANGE UP
HOROWITZ INDEX BLDMV+IHG-RTO: 36 — SIGNIFICANT CHANGE UP
HOROWITZ INDEX BLDMV+IHG-RTO: 36 — SIGNIFICANT CHANGE UP
INR BLD: 1.32 RATIO — HIGH (ref 0.85–1.18)
INR BLD: 1.32 RATIO — HIGH (ref 0.85–1.18)
LACTATE BLDV-MCNC: 1.9 MMOL/L — SIGNIFICANT CHANGE UP (ref 0.5–2)
LACTATE BLDV-MCNC: 1.9 MMOL/L — SIGNIFICANT CHANGE UP (ref 0.5–2)
MAGNESIUM SERPL-MCNC: 1.9 MG/DL — SIGNIFICANT CHANGE UP (ref 1.6–2.6)
MAGNESIUM SERPL-MCNC: 1.9 MG/DL — SIGNIFICANT CHANGE UP (ref 1.6–2.6)
MAGNESIUM SERPL-MCNC: 2 MG/DL — SIGNIFICANT CHANGE UP (ref 1.6–2.6)
MCHC RBC-ENTMCNC: 25.4 PG — LOW (ref 27–34)
MCHC RBC-ENTMCNC: 25.4 PG — LOW (ref 27–34)
MCHC RBC-ENTMCNC: 31.8 GM/DL — LOW (ref 32–36)
MCHC RBC-ENTMCNC: 31.8 GM/DL — LOW (ref 32–36)
MCV RBC AUTO: 79.8 FL — LOW (ref 80–100)
MCV RBC AUTO: 79.8 FL — LOW (ref 80–100)
NRBC # BLD: 0 /100 WBCS — SIGNIFICANT CHANGE UP (ref 0–0)
NRBC # BLD: 0 /100 WBCS — SIGNIFICANT CHANGE UP (ref 0–0)
O2 CT VFR BLD CALC: 38 MMHG — SIGNIFICANT CHANGE UP (ref 30–65)
O2 CT VFR BLD CALC: 40 MMHG — SIGNIFICANT CHANGE UP (ref 30–65)
O2 CT VFR BLD CALC: 40 MMHG — SIGNIFICANT CHANGE UP (ref 30–65)
PCO2 BLDMV: 34 MMHG — SIGNIFICANT CHANGE UP (ref 30–65)
PCO2 BLDMV: 34 MMHG — SIGNIFICANT CHANGE UP (ref 30–65)
PCO2 BLDMV: 35 MMHG — SIGNIFICANT CHANGE UP (ref 30–65)
PCO2 BLDMV: 35 MMHG — SIGNIFICANT CHANGE UP (ref 30–65)
PCO2 BLDMV: 37 MMHG — SIGNIFICANT CHANGE UP (ref 30–65)
PCO2 BLDMV: 37 MMHG — SIGNIFICANT CHANGE UP (ref 30–65)
PCO2 BLDV: 37 MMHG — LOW (ref 42–55)
PCO2 BLDV: 37 MMHG — LOW (ref 42–55)
PH BLDMV: 7.37 — SIGNIFICANT CHANGE UP (ref 7.32–7.45)
PH BLDMV: 7.37 — SIGNIFICANT CHANGE UP (ref 7.32–7.45)
PH BLDMV: 7.38 — SIGNIFICANT CHANGE UP (ref 7.32–7.45)
PH BLDV: 7.36 — SIGNIFICANT CHANGE UP (ref 7.32–7.43)
PH BLDV: 7.36 — SIGNIFICANT CHANGE UP (ref 7.32–7.43)
PHOSPHATE SERPL-MCNC: 4.7 MG/DL — HIGH (ref 2.5–4.5)
PHOSPHATE SERPL-MCNC: 4.7 MG/DL — HIGH (ref 2.5–4.5)
PHOSPHATE SERPL-MCNC: 5 MG/DL — HIGH (ref 2.5–4.5)
PHOSPHATE SERPL-MCNC: 5 MG/DL — HIGH (ref 2.5–4.5)
PHOSPHATE SERPL-MCNC: 5.8 MG/DL — HIGH (ref 2.5–4.5)
PHOSPHATE SERPL-MCNC: 5.8 MG/DL — HIGH (ref 2.5–4.5)
PLATELET # BLD AUTO: 247 K/UL — SIGNIFICANT CHANGE UP (ref 150–400)
PLATELET # BLD AUTO: 247 K/UL — SIGNIFICANT CHANGE UP (ref 150–400)
PO2 BLDV: 38 MMHG — SIGNIFICANT CHANGE UP (ref 25–45)
PO2 BLDV: 38 MMHG — SIGNIFICANT CHANGE UP (ref 25–45)
POTASSIUM BLDV-SCNC: 4.1 MMOL/L — SIGNIFICANT CHANGE UP (ref 3.5–5.1)
POTASSIUM BLDV-SCNC: 4.1 MMOL/L — SIGNIFICANT CHANGE UP (ref 3.5–5.1)
POTASSIUM SERPL-MCNC: 4 MMOL/L — SIGNIFICANT CHANGE UP (ref 3.5–5.3)
POTASSIUM SERPL-MCNC: 4 MMOL/L — SIGNIFICANT CHANGE UP (ref 3.5–5.3)
POTASSIUM SERPL-MCNC: 4.5 MMOL/L — SIGNIFICANT CHANGE UP (ref 3.5–5.3)
POTASSIUM SERPL-SCNC: 4 MMOL/L — SIGNIFICANT CHANGE UP (ref 3.5–5.3)
POTASSIUM SERPL-SCNC: 4 MMOL/L — SIGNIFICANT CHANGE UP (ref 3.5–5.3)
POTASSIUM SERPL-SCNC: 4.5 MMOL/L — SIGNIFICANT CHANGE UP (ref 3.5–5.3)
PROT SERPL-MCNC: 6.3 G/DL — SIGNIFICANT CHANGE UP (ref 6–8.3)
PROT SERPL-MCNC: 6.3 G/DL — SIGNIFICANT CHANGE UP (ref 6–8.3)
PROT SERPL-MCNC: 6.4 G/DL — SIGNIFICANT CHANGE UP (ref 6–8.3)
PROT SERPL-MCNC: 6.4 G/DL — SIGNIFICANT CHANGE UP (ref 6–8.3)
PROT SERPL-MCNC: 6.5 G/DL — SIGNIFICANT CHANGE UP (ref 6–8.3)
PROT SERPL-MCNC: 6.5 G/DL — SIGNIFICANT CHANGE UP (ref 6–8.3)
PROTHROM AB SERPL-ACNC: 13.7 SEC — HIGH (ref 9.5–13)
PROTHROM AB SERPL-ACNC: 13.7 SEC — HIGH (ref 9.5–13)
RBC # BLD: 4.45 M/UL — SIGNIFICANT CHANGE UP (ref 4.2–5.8)
RBC # BLD: 4.45 M/UL — SIGNIFICANT CHANGE UP (ref 4.2–5.8)
RBC # FLD: 15.4 % — HIGH (ref 10.3–14.5)
RBC # FLD: 15.4 % — HIGH (ref 10.3–14.5)
SAO2 % BLDMV: 64.7 — SIGNIFICANT CHANGE UP (ref 60–90)
SAO2 % BLDMV: 64.7 — SIGNIFICANT CHANGE UP (ref 60–90)
SAO2 % BLDMV: 65.6 — SIGNIFICANT CHANGE UP (ref 60–90)
SAO2 % BLDMV: 65.6 — SIGNIFICANT CHANGE UP (ref 60–90)
SAO2 % BLDMV: 68.2 — SIGNIFICANT CHANGE UP (ref 60–90)
SAO2 % BLDMV: 68.2 — SIGNIFICANT CHANGE UP (ref 60–90)
SAO2 % BLDV: 59.6 % — LOW (ref 67–88)
SAO2 % BLDV: 59.6 % — LOW (ref 67–88)
SODIUM SERPL-SCNC: 133 MMOL/L — LOW (ref 135–145)
SODIUM SERPL-SCNC: 133 MMOL/L — LOW (ref 135–145)
SODIUM SERPL-SCNC: 135 MMOL/L — SIGNIFICANT CHANGE UP (ref 135–145)
SODIUM SERPL-SCNC: 135 MMOL/L — SIGNIFICANT CHANGE UP (ref 135–145)
SODIUM SERPL-SCNC: 136 MMOL/L — SIGNIFICANT CHANGE UP (ref 135–145)
SODIUM SERPL-SCNC: 136 MMOL/L — SIGNIFICANT CHANGE UP (ref 135–145)
WBC # BLD: 10.55 K/UL — HIGH (ref 3.8–10.5)
WBC # BLD: 10.55 K/UL — HIGH (ref 3.8–10.5)
WBC # FLD AUTO: 10.55 K/UL — HIGH (ref 3.8–10.5)
WBC # FLD AUTO: 10.55 K/UL — HIGH (ref 3.8–10.5)

## 2023-10-21 PROCEDURE — 99291 CRITICAL CARE FIRST HOUR: CPT | Mod: 25

## 2023-10-21 PROCEDURE — 99291 CRITICAL CARE FIRST HOUR: CPT

## 2023-10-21 PROCEDURE — 71045 X-RAY EXAM CHEST 1 VIEW: CPT | Mod: 26

## 2023-10-21 PROCEDURE — 93010 ELECTROCARDIOGRAM REPORT: CPT

## 2023-10-21 RX ORDER — ACETAMINOPHEN 500 MG
1000 TABLET ORAL ONCE
Refills: 0 | Status: COMPLETED | OUTPATIENT
Start: 2023-10-21 | End: 2023-10-21

## 2023-10-21 RX ORDER — BUMETANIDE 0.25 MG/ML
1 INJECTION INTRAMUSCULAR; INTRAVENOUS
Qty: 20 | Refills: 0 | Status: DISCONTINUED | OUTPATIENT
Start: 2023-10-21 | End: 2023-10-24

## 2023-10-21 RX ADMIN — BUMETANIDE 20 MG/HR: 0.25 INJECTION INTRAMUSCULAR; INTRAVENOUS at 21:27

## 2023-10-21 RX ADMIN — BUMETANIDE 10 MG/HR: 0.25 INJECTION INTRAMUSCULAR; INTRAVENOUS at 16:48

## 2023-10-21 RX ADMIN — Medication 400 MILLIGRAM(S): at 21:27

## 2023-10-21 RX ADMIN — TAMSULOSIN HYDROCHLORIDE 0.4 MILLIGRAM(S): 0.4 CAPSULE ORAL at 21:27

## 2023-10-21 RX ADMIN — Medication 25 MILLIGRAM(S): at 01:01

## 2023-10-21 RX ADMIN — BUMETANIDE 132 MILLIGRAM(S): 0.25 INJECTION INTRAMUSCULAR; INTRAVENOUS at 05:27

## 2023-10-21 RX ADMIN — Medication 25 MILLIGRAM(S): at 08:43

## 2023-10-21 RX ADMIN — Medication 10 UNIT(S): at 11:46

## 2023-10-21 RX ADMIN — Medication 81 MILLIGRAM(S): at 11:45

## 2023-10-21 RX ADMIN — HEPARIN SODIUM 5000 UNIT(S): 5000 INJECTION INTRAVENOUS; SUBCUTANEOUS at 05:28

## 2023-10-21 RX ADMIN — Medication 1000 MILLIGRAM(S): at 21:45

## 2023-10-21 RX ADMIN — PANTOPRAZOLE SODIUM 40 MILLIGRAM(S): 20 TABLET, DELAYED RELEASE ORAL at 08:42

## 2023-10-21 RX ADMIN — Medication 25 MILLIGRAM(S): at 21:28

## 2023-10-21 RX ADMIN — HEPARIN SODIUM 5000 UNIT(S): 5000 INJECTION INTRAVENOUS; SUBCUTANEOUS at 21:30

## 2023-10-21 RX ADMIN — Medication 10 UNIT(S): at 08:43

## 2023-10-21 RX ADMIN — Medication 10 UNIT(S): at 16:46

## 2023-10-21 RX ADMIN — INSULIN GLARGINE 30 UNIT(S): 100 INJECTION, SOLUTION SUBCUTANEOUS at 21:52

## 2023-10-21 RX ADMIN — HEPARIN SODIUM 5000 UNIT(S): 5000 INJECTION INTRAVENOUS; SUBCUTANEOUS at 16:46

## 2023-10-21 RX ADMIN — Medication 25 MILLIGRAM(S): at 16:48

## 2023-10-21 RX ADMIN — ATORVASTATIN CALCIUM 80 MILLIGRAM(S): 80 TABLET, FILM COATED ORAL at 21:27

## 2023-10-21 RX ADMIN — Medication 1: at 11:45

## 2023-10-21 RX ADMIN — BUMETANIDE 10 MG/HR: 0.25 INJECTION INTRAMUSCULAR; INTRAVENOUS at 12:41

## 2023-10-21 NOTE — PROGRESS NOTE ADULT - SUBJECTIVE AND OBJECTIVE BOX
BRANDEN MARRUFO  MRN-[id number]  Patient is a 58y old  Male who presents with a chief complaint of NSTEMI (21 Oct 2023 12:10)    HPI:  Patient with separate chart from [facility], MRN# [id number]    57 yo Male pmhx CVA with mild left sided deficits, HTN, DM2, vertigo, HLD, Mobitz II s/pp Medtronic dual chamber ICD (12/21/22) initially presented to [facility] from home with complaints of dyspnea and chest pain and was admitted w/ acute hypoxic respiratory failure likely due to acute pulmonary edema due to acute HFrEF in setting of acute NSTEMI, LAURA and enterovirus infection. Pt with brief MICU stay at [facility] requiring bipap (no intubation), was treated for PNA with cefepime, and was found to have TTE done 9/26/23 showing EF 20% with severely decreased LVSF, multiple regional wall motion abnormalities, and elevated LV end diastolic pressure. Pt was transferred to [facility] for cardiac cath evaluation. Patient without any acute complaints, complaining of intermittent palpitations for the last 2 days. No chest pain, SOB, fevers, nausea, vomiting, abdominal pain.  (13 Oct 2023 21:05)      24 HOUR EVENTS:    REVIEW OF SYSTEMS:    CONSTITUTIONAL: No weakness, fevers or chills  EYES/ENT: No visual changes;  No vertigo or throat pain   NECK: No pain or stiffness  RESPIRATORY: No cough, wheezing, hemoptysis; No shortness of breath  CARDIOVASCULAR: No chest pain or palpitations  GASTROINTESTINAL: No abdominal or epigastric pain. No nausea, vomiting, or hematemesis; No diarrhea or constipation. No melena or hematochezia.  GENITOURINARY: No dysuria, frequency or hematuria  NEUROLOGICAL: No numbness or weakness  SKIN: No itching, rashes      ICU Vital Signs Last 24 Hrs  T(C): 37.7 (21 Oct 2023 16:00), Max: 37.8 (21 Oct 2023 07:00)  T(F): 99.9 (21 Oct 2023 16:00), Max: 100 (21 Oct 2023 07:00)  HR: 135 (21 Oct 2023 18:00) (117 - 135)  BP: --  BP(mean): --  ABP: 119/65 (21 Oct 2023 18:00) (86/51 - 127/70)  ABP(mean): 80 (21 Oct 2023 18:00) (55 - 86)  RR: 20 (21 Oct 2023 18:00) (15 - 36)  SpO2: 95% (21 Oct 2023 18:00) (93% - 99%)    O2 Parameters below as of 21 Oct 2023 18:00  Patient On (Oxygen Delivery Method): nasal cannula  O2 Flow (L/min): 2          CVP(mm Hg): 8 (10-21-23 @ 18:00) (0 - 13)  CO: --  CI: --  PA: 45/24 (10-21-23 @ 18:00) (34/18 - 46/24)  PA(mean): 33 (10-21-23 @ 18:00) (23 - 34)  PA(direct): --  PCWP: --  LA: --  RA: --  SVR: --  SVRI: --  PVR: --  PVRI: --  I&O's Summary    20 Oct 2023 07:01  -  21 Oct 2023 07:00  --------------------------------------------------------  IN: 403.6 mL / OUT: 1200 mL / NET: -796.4 mL    21 Oct 2023 07:01  -  21 Oct 2023 19:06  --------------------------------------------------------  IN: 820 mL / OUT: 1300 mL / NET: -480 mL        CAPILLARY BLOOD GLUCOSE    CAPILLARY BLOOD GLUCOSE      POCT Blood Glucose.: 122 mg/dL (21 Oct 2023 16:44)      PHYSICAL EXAM:  GENERAL: No acute distress, well-developed  HEAD:  Atraumatic, Normocephalic  EYES: EOMI, PERRLA, conjunctiva and sclera clear  NECK: Supple, no lymphadenopathy, no JVD  CHEST/LUNG: CTAB; No wheezes, rales, or rhonchi  HEART: Regular rate and rhythm. Normal S1/S2. No murmurs, rubs, or gallops  ABDOMEN: Soft, non-tender, non-distended; normal bowel sounds, no organomegaly  EXTREMITIES:  2+ peripheral pulses b/l, No clubbing, cyanosis, or edema  NEUROLOGY: A&O x 3, no focal deficits  SKIN: No rashes or lesions    ============================I/O===========================   I&O's Detail    20 Oct 2023 07:01  -  21 Oct 2023 07:00  --------------------------------------------------------  IN:    DOBUTamine: 63.6 mL    IV PiggyBack: 100 mL    Oral Fluid: 240 mL  Total IN: 403.6 mL    OUT:    Indwelling Catheter - Urethral (mL): 900 mL    Voided (mL): 300 mL  Total OUT: 1200 mL    Total NET: -796.4 mL      21 Oct 2023 07:01  -  21 Oct 2023 19:06  --------------------------------------------------------  IN:    Bumetanide: 80 mL    Bumetanide: 20 mL    Oral Fluid: 720 mL  Total IN: 820 mL    OUT:    Indwelling Catheter - Urethral (mL): 1300 mL  Total OUT: 1300 mL    Total NET: -480 mL        ============================ LABS =========================                        11.3   10.55 )-----------( 247      ( 21 Oct 2023 01:39 )             35.5     10-21    136  |  104  |  48<H>  ----------------------------<  160<H>  4.5   |  18<L>  |  1.98<H>    Ca    8.6      21 Oct 2023 15:37  Phos  4.7     10-21  Mg     2.0     10-21    TPro  6.3  /  Alb  3.1<L>  /  TBili  0.2  /  DBili  x   /  AST  31  /  ALT  33  /  AlkPhos  72  10-21                LIVER FUNCTIONS - ( 21 Oct 2023 15:37 )  Alb: 3.1 g/dL / Pro: 6.3 g/dL / ALK PHOS: 72 U/L / ALT: 33 U/L / AST: 31 U/L / GGT: x           PT/INR - ( 21 Oct 2023 01:39 )   PT: 13.7 sec;   INR: 1.32 ratio         PTT - ( 21 Oct 2023 01:39 )  PTT:26.1 sec  ABG - ( 21 Oct 2023 01:30 )  pH, Arterial: 7.38  pH, Blood: x     /  pCO2: 30    /  pO2: 108   / HCO3: 18    / Base Excess: -6.3  /  SaO2: 98.2              Blood Gas Venous - Lactate: 1.9 mmol/L (10-21-23 @ 09:30)  Blood Gas Arterial, Lactate: 1.4 mmol/L (10-21-23 @ 01:30)  Blood Gas Arterial, Lactate: 1.7 mmol/L (10-20-23 @ 21:40)  Blood Gas Venous - Lactate: 1.9 mmol/L (10-20-23 @ 17:06)  Lactate, Blood: 3.5 mmol/L (10-20-23 @ 12:32)  Blood Gas Venous - Lactate: 2.9 mmol/L (10-20-23 @ 12:30)    Urinalysis Basic - ( 21 Oct 2023 15:37 )    Color: x / Appearance: x / SG: x / pH: x  Gluc: 160 mg/dL / Ketone: x  / Bili: x / Urobili: x   Blood: x / Protein: x / Nitrite: x   Leuk Esterase: x / RBC: x / WBC x   Sq Epi: x / Non Sq Epi: x / Bacteria: x      ======================Micro/Rad/Cardio=================  Telemetry: Reviewed   EKG: Reviewed  CXR: Reviewed  Culture: Reviewed   Echo:   Cath:   ======================================================  PAST MEDICAL & SURGICAL HISTORY:  CVA (cerebrovascular accident)      T2DM (type 2 diabetes mellitus)      HTN (hypertension)      HLD (hyperlipidemia)      S/P cystoscopy      S/P hernia repair         BRANDEN MARRUFO  MRN-[id number]  Patient is a 58y old  Male who presents with a chief complaint of NSTEMI (21 Oct 2023 12:10)    HPI:  Patient with separate chart from [facility], MRN# [id number]    59 yo Male pmhx CVA with mild left sided deficits, HTN, DM2, vertigo, HLD, Mobitz II s/pp Medtronic dual chamber ICD (12/21/22) initially presented to [facility] from home with complaints of dyspnea and chest pain and was admitted w/ acute hypoxic respiratory failure likely due to acute pulmonary edema due to acute HFrEF in setting of acute NSTEMI, LAURA and enterovirus infection. Pt with brief MICU stay at [facility] requiring bipap (no intubation), was treated for PNA with cefepime, and was found to have TTE done 9/26/23 showing EF 20% with severely decreased LVSF, multiple regional wall motion abnormalities, and elevated LV end diastolic pressure. Pt was transferred to [facility] for cardiac cath evaluation. Patient without any acute complaints, complaining of intermittent palpitations for the last 2 days. No chest pain, SOB, fevers, nausea, vomiting, abdominal pain.  (13 Oct 2023 21:05)    24 HOUR EVENTS:  Patient seen and examined. Denies heart palpitations or shortness of breath. Pt's bumex increased to 4 mg/hr.    REVIEW OF SYSTEMS:    CONSTITUTIONAL: No weakness  EYES/ENT: No visual changes  NECK: No pain or stiffness  RESPIRATORY: No cough, No shortness of breath  CARDIOVASCULAR: No chest pain or palpitations  GASTROINTESTINAL: No abdominal or epigastric pain.   GENITOURINARY: No dysuria, frequency or hematuria  NEUROLOGICAL: No numbness or weakness  SKIN: No itching, rashes    ICU Vital Signs Last 24 Hrs  T(C): 37.7 (21 Oct 2023 16:00), Max: 37.8 (21 Oct 2023 07:00)  T(F): 99.9 (21 Oct 2023 16:00), Max: 100 (21 Oct 2023 07:00)  HR: 135 (21 Oct 2023 18:00) (117 - 135)  ABP: 119/65 (21 Oct 2023 18:00) (86/51 - 127/70)  ABP(mean): 80 (21 Oct 2023 18:00) (55 - 86)  RR: 20 (21 Oct 2023 18:00) (15 - 36)  SpO2: 95% (21 Oct 2023 18:00) (93% - 99%)    O2 Parameters below as of 21 Oct 2023 18:00  Patient On (Oxygen Delivery Method): nasal cannula  O2 Flow (L/min): 2    CVP(mm Hg): 8 (10-21-23 @ 18:00) (0 - 13)  PA: 45/24 (10-21-23 @ 18:00) (34/18 - 46/24)  PA(mean): 33 (10-21-23 @ 18:00) (23 - 34)    I&O's Summary    20 Oct 2023 07:01  -  21 Oct 2023 07:00  --------------------------------------------------------  IN: 403.6 mL / OUT: 1200 mL / NET: -796.4 mL    21 Oct 2023 07:01  -  21 Oct 2023 19:06  --------------------------------------------------------  IN: 820 mL / OUT: 1300 mL / NET: -480 mL    CAPILLARY BLOOD GLUCOSE    POCT Blood Glucose.: 122 mg/dL (21 Oct 2023 16:44)    PHYSICAL EXAM:  GENERAL: No acute distress  HEAD:  Atraumatic, Normocephalic  EYES: EOMI, PERRLA, conjunctiva and sclera clear  NECK: Supple, no lymphadenopathy  CHEST/LUNG: Lung sounds clear, diminished at the bases  HEART: Tachycardic. No murmurs, rubs, or gallops  ABDOMEN: Soft, non-tender, non-distended; normal bowel sounds, no organomegaly  EXTREMITIES:  2+ peripheral pulses b/l, No clubbing, cyanosis, or edema  NEUROLOGY: A&O x 3, no focal deficits  SKIN: No rashes    ============================I/O===========================   I&O's Detail    20 Oct 2023 07:01  -  21 Oct 2023 07:00  --------------------------------------------------------  IN:    DOBUTamine: 63.6 mL    IV PiggyBack: 100 mL    Oral Fluid: 240 mL  Total IN: 403.6 mL    OUT:    Indwelling Catheter - Urethral (mL): 900 mL    Voided (mL): 300 mL  Total OUT: 1200 mL    Total NET: -796.4 mL      21 Oct 2023 07:01  -  21 Oct 2023 19:06  --------------------------------------------------------  IN:    Bumetanide: 80 mL    Bumetanide: 20 mL    Oral Fluid: 720 mL  Total IN: 820 mL    OUT:    Indwelling Catheter - Urethral (mL): 1300 mL  Total OUT: 1300 mL    Total NET: -480 mL  ============================ LABS =========================             11.3   10.55 )-----------( 247      ( 21 Oct 2023 01:39 )             35.5     10-21    136  |  104  |  48<H>  ----------------------------<  160<H>  4.5   |  18<L>  |  1.98<H>    Ca    8.6      21 Oct 2023 15:37  Phos  4.7     10-21  Mg     2.0     10-21    TPro  6.3  /  Alb  3.1<L>  /  TBili  0.2  /  DBili  x   /  AST  31  /  ALT  33  /  AlkPhos  72  10-21    LIVER FUNCTIONS - ( 21 Oct 2023 15:37 )  Alb: 3.1 g/dL / Pro: 6.3 g/dL / ALK PHOS: 72 U/L / ALT: 33 U/L / AST: 31 U/L / GGT: x           PT/INR - ( 21 Oct 2023 01:39 )   PT: 13.7 sec;   INR: 1.32 ratio      PTT - ( 21 Oct 2023 01:39 )  PTT:26.1 sec  ABG - ( 21 Oct 2023 01:30 )  pH, Arterial: 7.38  pH, Blood: x     /  pCO2: 30    /  pO2: 108   / HCO3: 18    / Base Excess: -6.3  /  SaO2: 98.2      Blood Gas Venous - Lactate: 1.9 mmol/L (10-21-23 @ 09:30)  Blood Gas Arterial, Lactate: 1.4 mmol/L (10-21-23 @ 01:30)  Blood Gas Arterial, Lactate: 1.7 mmol/L (10-20-23 @ 21:40)  Blood Gas Venous - Lactate: 1.9 mmol/L (10-20-23 @ 17:06)  Lactate, Blood: 3.5 mmol/L (10-20-23 @ 12:32)  Blood Gas Venous - Lactate: 2.9 mmol/L (10-20-23 @ 12:30)    Urinalysis Basic - ( 21 Oct 2023 15:37 )    Color: x / Appearance: x / SG: x / pH: x  Gluc: 160 mg/dL / Ketone: x  / Bili: x / Urobili: x   Blood: x / Protein: x / Nitrite: x   Leuk Esterase: x / RBC: x / WBC x   Sq Epi: x / Non Sq Epi: x / Bacteria: x  ======================Micro/Rad/Cardio=================  Telemetry: Reviewed   EKG: Reviewed  CXR: Reviewed  Culture: Reviewed   ======================================================  PAST MEDICAL & SURGICAL HISTORY:  CVA (cerebrovascular accident)    T2DM (type 2 diabetes mellitus)    HTN (hypertension)    HLD (hyperlipidemia)    S/P cystoscopy    S/P hernia repair

## 2023-10-21 NOTE — PROGRESS NOTE ADULT - PROBLEM SELECTOR PLAN 1
- Etiology: ischemic  - GDMT: discontinued Torpol XL 10/20 and deferring RAASi/MRA/SGLT2i given concerns of cardiogenic shock   - Diuretics: Bumex gtt to keep net negative 2L over 24hrs  - Device: has ICD in place and notably with high burden of RV pacing  - Advanced therapies: severe LV dysfunction with tachycardia and poor non-invasive hemodynamics concerning, currently holding off on launching VAD/transplant eval however may need to reassess. Of note he has good support and no clear psychosocial red flags. ABO AB. would need neurologic assessment with prior CVA and ideally improved conditioning given ongoing prolonged hospitalization.

## 2023-10-21 NOTE — PROGRESS NOTE ADULT - ASSESSMENT
Assessment & Plan:     57 yo Male pmhx CVA with mild left sided deficits, HTN, DM2, vertigo, HLD, Mobitz II s/pp Medtronic dual chamber ICD (12/21/22) initially presented to OSH for SOB c/f ADHF vs. PNA, later found to have NSTEMI transferred to [facility] for LHC evaluation. s/p LHC on 10/16 w/ 3v disease and RHC on 10/19 for hemodynamic assessment. Admitted to CICU for CABG eval vs. advanced therapy vs. high risk PCI    ======NEURO======  #Mental status  - Baseline AAOx3, currently at baseline    #Hx of CVA w/ mild L residual deficit  - Neurological assessment per protocol     ======CV======  #CAD  #HTN  #HFrEF  Likely iso ischemic insult  ECHO 10/16: EF 20%, severely reduced LVSF  LHC 10/16: 95% pLAD, 80% mid and distal LCx, 90% mid and distal RCA  RHC 10/19: RA 10, PCWP 20, CO 4.4, CI 2.3  CMR 10/18: limited viability in LAD territory  - f/u HF, CTS recs  - Advance therapy vs. CABG vs. high risk PCI   - c/w ASA 81, Atorv 80mg  - c/w Bumex PRN   - c/w Hydralazine 25 q8h for AF reduction  - s/p Dobutamine (10/20 overnight)  - avoid BB/CCB given borderline cardiac function  - Start GDMT when appropriate    #Tachycardia  #Mobitz II s/p AICD  - Likely reflex tachycardiac iso hypoxia +/- NSTEMI +/- ADHF  - Continue to monitor    #HLD  - c/w Atorv 80mg     ======PULM======  #Pulmonary edema  - Oxygen therapy PRN keep O2%>92%  - Bumex PRN  - Albuterol PRN    ======RENAL======  #LAURA:   sCr on admission 1.28, uptrending likely iso NSTEMI and HFrEF  - Monitor I/O  - Trend sCr  - Renally dose meds, avoid nephrotoxins    #Hyponatremia  - mild hypoNa to 133 noted on 10/20, asymptomatic  - likely iso CHF +/- LAURA  - Continue to trend    #Elevated lactate  - uptrending lactate noted likely iso NSTEMI  - monitr VS  - trend lactate    #BPH  - c/w Flomax 0.4mg qhs    ======GI======  - No active issues  - CC DASH Halal diet  - GI ppx: PPI 40mg daily  - c/w Bowel regimen    ======ENDO======  #DM2  - A1c 10/14 8.4%  - c/w Lantus 30u qhs + lispro 10u premeal + ISS  - FS    ======HEMATOLOGIC======  - No active issues  - DVT ppx: HSQ iso LAURA    ======ID======  - No active issues  - Recent admission to OSH for possible PNA s/p 14 days of cefepime (last dose 10/14)  - Currently no signs of infection, WBC 10.6, afebrile, no indications for abx. Assessment & Plan:     59 yo Male pmhx CVA with mild left sided deficits, HTN, DM2, vertigo, HLD, Mobitz II s/pp Medtronic dual chamber ICD (12/21/22) initially presented to OSH for SOB c/f ADHF vs. PNA, later found to have NSTEMI transferred to [facility] for LHC evaluation. s/p LHC on 10/16 w/ 3v disease and RHC on 10/19 for hemodynamic assessment. Admitted to CICU for CABG eval vs. advanced therapy vs. high risk PCI    ======NEURO======  #Mental status  - Baseline AAOx3, currently at baseline    #Hx of CVA w/ mild L residual deficit  - Neurological assessment per protocol     ======CV======  #CAD  #HTN  #HFrEF  Likely iso ischemic insult  ECHO 10/16: EF 20%, severely reduced LVSF  LHC 10/16: 95% pLAD, 80% mid and distal LCx, 90% mid and distal RCA  RHC 10/19: RA 10, PCWP 20, CO 4.4, CI 2.3  CMR 10/18: limited viability in LAD territory  [place] 10/21:    - f/u HF, CTS recs  - Advance therapy vs. CABG vs. high risk PCI   - c/w ASA 81, Atorv 80mg  - c/w Bumex PRN   - c/w Hydralazine 25 q8h for AF reduction  - s/p Dobutamine (10/20 overnight)  - avoid BB/CCB given borderline cardiac function  - Start GDMT when appropriate    #Tachycardia  #Mobitz II s/p AICD  - Likely reflex tachycardiac iso hypoxia +/- NSTEMI +/- ADHF  - Continue to monitor    #HLD  - c/w Atorv 80mg     ======PULM======  #Pulmonary edema  - Oxygen therapy PRN keep O2%>92%  - Bumex PRN  - Albuterol PRN    ======RENAL======  #LAURA:   sCr on admission 1.28, uptrending likely iso NSTEMI and HFrEF  - Monitor I/O  - Trend sCr  - Renally dose meds, avoid nephrotoxins    #Hyponatremia  - mild hypoNa to 133 noted on 10/20, asymptomatic  - likely iso CHF +/- LAURA  - Continue to trend    #Elevated lactate  - uptrending lactate noted likely iso NSTEMI  - monitr VS  - trend lactate    #BPH  - c/w Flomax 0.4mg qhs    ======GI======  - No active issues  - CC DASH Halal diet  - GI ppx: PPI 40mg daily  - c/w Bowel regimen    ======ENDO======  #DM2  - A1c 10/14 8.4%  - c/w Lantus 30u qhs + lispro 10u premeal + ISS  - FS    ======HEMATOLOGIC======  - No active issues  - DVT ppx: HSQ iso LAURA    ======ID======  - No active issues  - Recent admission to OSH for possible PNA s/p 14 days of cefepime (last dose 10/14)  - Currently no signs of infection, WBC 10.6, afebrile, no indications for abx Assessment & Plan:     57 yo Male pmhx CVA with mild left sided deficits, HTN, DM2, vertigo, HLD, Mobitz II s/pp Medtronic dual chamber ICD (12/21/22) initially presented to OSH for SOB c/f ADHF vs. PNA, later found to have NSTEMI transferred to [facility] for LHC evaluation. s/p LHC on 10/16 w/ 3v disease and RHC on 10/19 for hemodynamic assessment. Admitted to CICU for CABG eval vs. advanced therapy vs. high risk PCI    ======NEURO======  #Mental status  - Baseline AAOx3, currently at baseline    #Hx of CVA w/ mild L residual deficit  - Neurological assessment per protocol     ======CV======  #CAD  #HTN  #HFrEF  Likely iso ischemic insult  ECHO 10/16: EF 20%, severely reduced LVSF  LHC 10/16: 95% pLAD, 80% mid and distal LCx, 90% mid and distal RCA  RHC 10/19: RA 10, PCWP 20, CO 4.4, CI 2.3  CMR 10/18: limited viability in LAD territory  s/p Riner (10/21 - ): CI 2.4 off dobutamine    - f/u HF, CTS recs regarding Advance therapy vs. CABG vs. high risk PCI  - c/w ASA 81, Atorv 80mg  - c/w Bumex drip  - s/p Dobutamine (10/20 overnight); Calculated CI > 2.1 off dobutamine, can consider dc Riner soon  - c/w Hydralazine 25 q8h for AF reduction  - avoid BB/CCB given borderline cardiac function  - Start GDMT when appropriate    #Tachycardia  #Mobitz II s/p AICD  - Likely reflex tachycardiac iso hypoxia +/- NSTEMI +/- ADHF  - Continue to monitor    #HLD  - c/w Atorv 80mg     ======PULM======  #Pulmonary edema  - Oxygen therapy PRN keep O2%>92%  - c/w Bumex drip  - Albuterol PRN    ======RENAL======  #LAURA:   sCr on admission 1.28, uptrending likely iso diuresis, NSTEMI and HFrEF  - Monitor I/O  - Trend sCr  - Renally dose meds, avoid nephrotoxins    #Hyponatremia  - mild hypoNa to 133 noted on 10/20, asymptomatic  - likely iso CHF +/- LAURA  - Continue to trend    #Elevated lactate  - uptrending lactate noted likely iso HFrEF  - monitr VS  - trend lactate    #BPH  - c/w Flomax 0.4mg qhs    ======GI======  - No active issues  - CC DASH Halal diet  - GI ppx: PPI 40mg daily  - c/w Bowel regimen    ======ENDO======  #DM2  - A1c 10/14 8.4%  - c/w Lantus 30u qhs + lispro 10u premeal + ISS  - FS    ======HEMATOLOGIC======  - No active issues  - DVT ppx: HSQ iso LAURA    ======ID======  - No active issues  - Recent admission to OSH for possible PNA s/p 14 days of cefepime (last dose 10/14)  - Currently no signs of infection, WBC 10.6, afebrile, no indications for abx

## 2023-10-21 NOTE — PROGRESS NOTE ADULT - ATTENDING COMMENTS
59yo M with CVA DM2 s/p ICD here with shortness of breath, admitted for ADHF vs PNA and LHC on 10/16 showed 3v dz and reduced EF. RHC on 10/19 showed CVP of 10, PCWP 29 and CI of 2.3, started empirically on  for shortness of breath but repeat RHC with almost identical numbers now. Will start diuresis    Neuro: no active issues  Pulm: volume overload  CV: stop dobutamine and recheck numbers, appear similar to day prior.   Renal: not responding to diuresis despite volume overload, will start bumex gtt  GI: DASH diet  ID: no abx  Heme: sqh ppx  Endo: BG controlled  art line (10/20) and IJ swan (10/20)

## 2023-10-21 NOTE — PROGRESS NOTE ADULT - ASSESSMENT
57 YO M with a history of CVA with residual mild R paresthesias, second degree Mobitz II heart block s/p DC-ICD 12/2022 (initial concern for sarcoid but negative biopsy/PET post procedure), DM2 (A1c 8.4%), and HTN who was admitted to [facility] with chest pain and dyspnea, found to have NSTEMI with markedly elevated troponins and new severe LV dysfunction with regional wall motion abnormalities as well as + enterovirus. He required NIPPV and was diuresed before being transferred to [facility] where LHC performed which revealed severe 3v CAD with critical proximal LAD involvement. CTS was consulted for CABG evaluation and HF consulted for comanagement.    He ultimately underwent RHC with revealed elevated wedge but normal cardiac output. Within the past 24 hours patient has become more tachycardic, is cool on exam and noted to have elevated JVP. He was transferred to CICU for swan placement and closer observation of hemodynamics. At this time patient would be consider poor candidate for CABG however would  benefit from high risk PCI with the potential need for advance therapies      REVIEW OF STUDIES  RHC 10/19: RA 10, RV 47/9/13, Wedge 29, PA 41/26/33, CO/CI 4.4/2.3, PA sat 57.3  EKG: sinus tachycardia, septal q-waves, left axis deviation   TTE 10/16/23: LV 5.5 cm, LVEF 20% with regional WMAs worse in the apex, LVOT VTI 8 cm, normal RV size/function, severe functional MR, small pericardial effusion, estimated RA pressure 8 mmHg   TTE 12/2022: normal LVEF   [facility]: 95% discrete proximal LAD disease and sequential multifocal disease with good distal target, 80-90% proximal LCx disease just prior to marginals, severe multifocal RCA disease with good targets     Hemodynamics:  10/21/23: RA 13 with V-wave 21, PA 50/33, PCW 33, MvO2 68.2, Cris CO/CI 4.4/2.56

## 2023-10-21 NOTE — PROGRESS NOTE ADULT - SUBJECTIVE AND OBJECTIVE BOX
Patient is a 58y old  Male who presents with a chief complaint of NSTEMI (20 Oct 2023 19:16)    HPI:  Patient with separate chart from [facility], MRN# [id number]    59 yo Male pmhx CVA with mild left sided deficits, HTN, DM2, vertigo, HLD, Mobitz II s/pp Medtronic dual chamber ICD (22) initially presented to [facility] from home with complaints of dyspnea and chest pain and was admitted w/ acute hypoxic respiratory failure likely due to acute pulmonary edema due to acute HFrEF in setting of acute NSTEMI, LAURA and enterovirus infection. Pt with brief MICU stay at [facility] requiring bipap (no intubation), was treated for PNA with cefepime, and was found to have TTE done 23 showing EF 20% with severely decreased LVSF, multiple regional wall motion abnormalities, and elevated LV end diastolic pressure. Pt was transferred to [facility] for cardiac cath evaluation. Patient without any acute complaints, complaining of intermittent palpitations for the last 2 days. No chest pain, SOB, fevers, nausea, vomiting, abdominal pain.  (13 Oct 2023 21:05)       INTERVAL HPI/OVERNIGHT EVENTS:   No overnight events   Afebrile, hemodynamically stable     Subjective:    ICU Vital Signs Last 24 Hrs  T(C): 37.8 (21 Oct 2023 07:00), Max: 37.8 (21 Oct 2023 07:00)  T(F): 100 (21 Oct 2023 07:00), Max: 100 (21 Oct 2023 07:00)  HR: 126 (21 Oct 2023 07:30) (116 - 127)  BP: 113/79 (20 Oct 2023 19:01) (101/76 - 135/85)  BP(mean): 82 (20 Oct 2023 19:01) (82 - 103)  ABP: 110/65 (21 Oct 2023 07:30) (96/37 - 127/70)  ABP(mean): 79 (21 Oct 2023 07:30) (55 - 86)  RR: 24 (21 Oct 2023 07:30) (18 - 36)  SpO2: 94% (21 Oct 2023 07:30) (90% - 99%)    O2 Parameters below as of 21 Oct 2023 07:30  Patient On (Oxygen Delivery Method): nasal cannula  O2 Flow (L/min): 4        I&O's Summary    20 Oct 2023 07:01  -  21 Oct 2023 07:00  --------------------------------------------------------  IN: 403.6 mL / OUT: 1200 mL / NET: -796.4 mL          Daily     Daily Weight in k.4 (21 Oct 2023 05:00)    Adult Advanced Hemodynamics Last 24 Hrs  CVP(mm Hg): --  CVP(cm H2O): --  CO: --  CI: --  PA: --  PA(mean): --  PCWP: --  SVR: --  SVRI: --  PVR: --  PVRI: --    EKG/Telemetry Events:    MEDICATIONS  (STANDING):  aspirin enteric coated 81 milliGRAM(s) Oral daily  atorvastatin 80 milliGRAM(s) Oral at bedtime  buMETAnide IVPB 4 milliGRAM(s) IV Intermittent two times a day  dextrose 5%. 1000 milliLiter(s) (50 mL/Hr) IV Continuous <Continuous>  dextrose 5%. 1000 milliLiter(s) (100 mL/Hr) IV Continuous <Continuous>  dextrose 50% Injectable 25 Gram(s) IV Push once  dextrose 50% Injectable 12.5 Gram(s) IV Push once  dextrose 50% Injectable 25 Gram(s) IV Push once  glucagon  Injectable 1 milliGRAM(s) IntraMuscular once  heparin   Injectable 5000 Unit(s) SubCutaneous every 8 hours  hydrALAZINE 25 milliGRAM(s) Oral every 8 hours  insulin glargine Injectable (LANTUS) 30 Unit(s) SubCutaneous at bedtime  insulin lispro (ADMELOG) corrective regimen sliding scale   SubCutaneous at bedtime  insulin lispro (ADMELOG) corrective regimen sliding scale   SubCutaneous three times a day before meals  insulin lispro Injectable (ADMELOG) 10 Unit(s) SubCutaneous three times a day before meals  pantoprazole    Tablet 40 milliGRAM(s) Oral before breakfast  senna 2 Tablet(s) Oral at bedtime  tamsulosin 0.4 milliGRAM(s) Oral at bedtime    MEDICATIONS  (PRN):  acetaminophen     Tablet .. 650 milliGRAM(s) Oral every 6 hours PRN Temp greater or equal to 38C (100.4F), Mild Pain (1 - 3)  albuterol    90 MICROgram(s) HFA Inhaler 2 Puff(s) Inhalation every 6 hours PRN Shortness of Breath and/or Wheezing  benzocaine/menthol Lozenge 1 Lozenge Oral every 2 hours PRN Sore Throat  dextrose Oral Gel 15 Gram(s) Oral once PRN Blood Glucose LESS THAN 70 milliGRAM(s)/deciliter  guaiFENesin Oral Liquid (Sugar-Free) 100 milliGRAM(s) Oral every 6 hours PRN Cough  polyethylene glycol 3350 17 Gram(s) Oral daily PRN Constipation      PHYSICAL EXAM:  GENERAL:   HEAD:  Atraumatic, Normocephalic  EYES: EOMI, PERRLA, conjunctiva and sclera clear  NECK: Supple, No JVD, Normal thyroid, no enlarged nodes  NERVOUS SYSTEM:  Alert & Awake.   CHEST/LUNG: B/L good air entry; No rales, rhonchi, or wheezing  HEART: S1S2 normal, no S3, Regular rate and rhythm; No murmurs  ABDOMEN: Soft, Nontender, Nondistended; Bowel sounds present  EXTREMITIES:  2+ Peripheral Pulses, No clubbing, cyanosis, or edema  LYMPH: No lymphadenopathy noted  SKIN: No rashes or lesions    LABS:                        11.3   10.55 )-----------( 247      ( 21 Oct 2023 01:39 )             35.5     10-    133<L>  |  99  |  38<H>  ----------------------------<  152<H>  4.5   |  15<L>  |  1.87<H>    Ca    9.0      21 Oct 2023 01:39  Phos  5.8     10-  Mg     2.0     10-    TPro  6.5  /  Alb  3.3  /  TBili  0.4  /  DBili  x   /  AST  40  /  ALT  33  /  AlkPhos  76  10-    LIVER FUNCTIONS - ( 21 Oct 2023 01:39 )  Alb: 3.3 g/dL / Pro: 6.5 g/dL / ALK PHOS: 76 U/L / ALT: 33 U/L / AST: 40 U/L / GGT: x           PT/INR - ( 21 Oct 2023 01:39 )   PT: 13.7 sec;   INR: 1.32 ratio         PTT - ( 21 Oct 2023 01:39 )  PTT:26.1 sec  CAPILLARY BLOOD GLUCOSE      POCT Blood Glucose.: 153 mg/dL (20 Oct 2023 22:57)  POCT Blood Glucose.: 157 mg/dL (20 Oct 2023 18:36)  POCT Blood Glucose.: 150 mg/dL (20 Oct 2023 16:59)  POCT Blood Glucose.: 173 mg/dL (20 Oct 2023 12:28)  POCT Blood Glucose.: 187 mg/dL (20 Oct 2023 11:20)  POCT Blood Glucose.: 254 mg/dL (20 Oct 2023 08:13)    ABG - ( 21 Oct 2023 01:30 )  pH, Arterial: 7.38  pH, Blood: x     /  pCO2: 30    /  pO2: 108   / HCO3: 18    / Base Excess: -6.3  /  SaO2: 98.2                    Urinalysis Basic - ( 21 Oct 2023 01:39 )    Color: x / Appearance: x / SG: x / pH: x  Gluc: 152 mg/dL / Ketone: x  / Bili: x / Urobili: x   Blood: x / Protein: x / Nitrite: x   Leuk Esterase: x / RBC: x / WBC x   Sq Epi: x / Non Sq Epi: x / Bacteria: x          RADIOLOGY & ADDITIONAL TESTS:  CXR:        Care Discussed with Consultants/Other Providers [ x] YES  [ ] NO           Patient is a 58y old  Male who presents with a chief complaint of NSTEMI (20 Oct 2023 19:16)    HPI:  Patient with separate chart from [facility], MRN# [id number]    59 yo Male pmhx CVA with mild left sided deficits, HTN, DM2, vertigo, HLD, Mobitz II s/pp Medtronic dual chamber ICD (22) initially presented to [facility] from home with complaints of dyspnea and chest pain and was admitted w/ acute hypoxic respiratory failure likely due to acute pulmonary edema due to acute HFrEF in setting of acute NSTEMI, LAURA and enterovirus infection. Pt with brief MICU stay at [facility] requiring bipap (no intubation), was treated for PNA with cefepime, and was found to have TTE done 23 showing EF 20% with severely decreased LVSF, multiple regional wall motion abnormalities, and elevated LV end diastolic pressure. Pt was transferred to [facility] for cardiac cath evaluation. Patient without any acute complaints, complaining of intermittent palpitations for the last 2 days. No chest pain, SOB, fevers, nausea, vomiting, abdominal pain.  (13 Oct 2023 21:05)       INTERVAL HPI/OVERNIGHT EVENTS:   s/p swan overnight  Afebrile, hemodynamically stable     Subjective: No acute complaints, denies f/cp/sob/n/v/d/c/gu sx.    ICU Vital Signs Last 24 Hrs  T(C): 37.8 (21 Oct 2023 07:00), Max: 37.8 (21 Oct 2023 07:00)  T(F): 100 (21 Oct 2023 07:00), Max: 100 (21 Oct 2023 07:00)  HR: 126 (21 Oct 2023 07:30) (116 - 127)  BP: 113/79 (20 Oct 2023 19:01) (101/76 - 135/85)  BP(mean): 82 (20 Oct 2023 19:01) (82 - 103)  ABP: 110/65 (21 Oct 2023 07:30) (96/37 - 127/70)  ABP(mean): 79 (21 Oct 2023 07:30) (55 - 86)  RR: 24 (21 Oct 2023 07:30) (18 - 36)  SpO2: 94% (21 Oct 2023 07:30) (90% - 99%)    O2 Parameters below as of 21 Oct 2023 07:30  Patient On (Oxygen Delivery Method): nasal cannula  O2 Flow (L/min): 4        I&O's Summary    20 Oct 2023 07:01  -  21 Oct 2023 07:00  --------------------------------------------------------  IN: 403.6 mL / OUT: 1200 mL / NET: -796.4 mL          Daily     Daily Weight in k.4 (21 Oct 2023 05:00)    Adult Advanced Hemodynamics Last 24 Hrs  CVP(mm Hg): --  CVP(cm H2O): --  CO: --  CI: --  PA: --  PA(mean): --  PCWP: --  SVR: --  SVRI: --  PVR: --  PVRI: --    EKG/Telemetry Events:    MEDICATIONS  (STANDING):  aspirin enteric coated 81 milliGRAM(s) Oral daily  atorvastatin 80 milliGRAM(s) Oral at bedtime  buMETAnide IVPB 4 milliGRAM(s) IV Intermittent two times a day  dextrose 5%. 1000 milliLiter(s) (50 mL/Hr) IV Continuous <Continuous>  dextrose 5%. 1000 milliLiter(s) (100 mL/Hr) IV Continuous <Continuous>  dextrose 50% Injectable 25 Gram(s) IV Push once  dextrose 50% Injectable 12.5 Gram(s) IV Push once  dextrose 50% Injectable 25 Gram(s) IV Push once  glucagon  Injectable 1 milliGRAM(s) IntraMuscular once  heparin   Injectable 5000 Unit(s) SubCutaneous every 8 hours  hydrALAZINE 25 milliGRAM(s) Oral every 8 hours  insulin glargine Injectable (LANTUS) 30 Unit(s) SubCutaneous at bedtime  insulin lispro (ADMELOG) corrective regimen sliding scale   SubCutaneous at bedtime  insulin lispro (ADMELOG) corrective regimen sliding scale   SubCutaneous three times a day before meals  insulin lispro Injectable (ADMELOG) 10 Unit(s) SubCutaneous three times a day before meals  pantoprazole    Tablet 40 milliGRAM(s) Oral before breakfast  senna 2 Tablet(s) Oral at bedtime  tamsulosin 0.4 milliGRAM(s) Oral at bedtime    MEDICATIONS  (PRN):  acetaminophen     Tablet .. 650 milliGRAM(s) Oral every 6 hours PRN Temp greater or equal to 38C (100.4F), Mild Pain (1 - 3)  albuterol    90 MICROgram(s) HFA Inhaler 2 Puff(s) Inhalation every 6 hours PRN Shortness of Breath and/or Wheezing  benzocaine/menthol Lozenge 1 Lozenge Oral every 2 hours PRN Sore Throat  dextrose Oral Gel 15 Gram(s) Oral once PRN Blood Glucose LESS THAN 70 milliGRAM(s)/deciliter  guaiFENesin Oral Liquid (Sugar-Free) 100 milliGRAM(s) Oral every 6 hours PRN Cough  polyethylene glycol 3350 17 Gram(s) Oral daily PRN Constipation      PHYSICAL EXAM:  GENERAL:   HEAD:  Atraumatic, Normocephalic  EYES: EOMI, PERRLA, conjunctiva and sclera clear  NECK: Supple, No JVD, Normal thyroid, no enlarged nodes  NERVOUS SYSTEM:  Alert & Awake.   CHEST/LUNG: B/L good air entry; No rales, rhonchi, or wheezing  HEART: S1S2 normal, no S3, Regular rate and rhythm; No murmurs  ABDOMEN: Soft, Nontender, Nondistended; Bowel sounds present  EXTREMITIES:  2+ Peripheral Pulses, No clubbing, cyanosis, or edema  LYMPH: No lymphadenopathy noted  SKIN: No rashes or lesions    LABS:                        11.3   10.55 )-----------( 247      ( 21 Oct 2023 01:39 )             35.5     10-    133<L>  |  99  |  38<H>  ----------------------------<  152<H>  4.5   |  15<L>  |  1.87<H>    Ca    9.0      21 Oct 2023 01:39  Phos  5.8     10-  Mg     2.0     10-    TPro  6.5  /  Alb  3.3  /  TBili  0.4  /  DBili  x   /  AST  40  /  ALT  33  /  AlkPhos  76  10-    LIVER FUNCTIONS - ( 21 Oct 2023 01:39 )  Alb: 3.3 g/dL / Pro: 6.5 g/dL / ALK PHOS: 76 U/L / ALT: 33 U/L / AST: 40 U/L / GGT: x           PT/INR - ( 21 Oct 2023 01:39 )   PT: 13.7 sec;   INR: 1.32 ratio         PTT - ( 21 Oct 2023 01:39 )  PTT:26.1 sec  CAPILLARY BLOOD GLUCOSE      POCT Blood Glucose.: 153 mg/dL (20 Oct 2023 22:57)  POCT Blood Glucose.: 157 mg/dL (20 Oct 2023 18:36)  POCT Blood Glucose.: 150 mg/dL (20 Oct 2023 16:59)  POCT Blood Glucose.: 173 mg/dL (20 Oct 2023 12:28)  POCT Blood Glucose.: 187 mg/dL (20 Oct 2023 11:20)  POCT Blood Glucose.: 254 mg/dL (20 Oct 2023 08:13)    ABG - ( 21 Oct 2023 01:30 )  pH, Arterial: 7.38  pH, Blood: x     /  pCO2: 30    /  pO2: 108   / HCO3: 18    / Base Excess: -6.3  /  SaO2: 98.2                    Urinalysis Basic - ( 21 Oct 2023 01:39 )    Color: x / Appearance: x / SG: x / pH: x  Gluc: 152 mg/dL / Ketone: x  / Bili: x / Urobili: x   Blood: x / Protein: x / Nitrite: x   Leuk Esterase: x / RBC: x / WBC x   Sq Epi: x / Non Sq Epi: x / Bacteria: x          RADIOLOGY & ADDITIONAL TESTS:  CXR:        Care Discussed with Consultants/Other Providers [ x] YES  [ ] NO           Patient is a 58y old  Male who presents with a chief complaint of NSTEMI (20 Oct 2023 19:16)    HPI:  Patient with separate chart from [facility], MRN# [id number]    59 yo Male pmhx CVA with mild left sided deficits, HTN, DM2, vertigo, HLD, Mobitz II s/pp Medtronic dual chamber ICD (22) initially presented to [facility] from home with complaints of dyspnea and chest pain and was admitted w/ acute hypoxic respiratory failure likely due to acute pulmonary edema due to acute HFrEF in setting of acute NSTEMI, LAURA and enterovirus infection. Pt with brief MICU stay at [facility] requiring bipap (no intubation), was treated for PNA with cefepime, and was found to have TTE done 23 showing EF 20% with severely decreased LVSF, multiple regional wall motion abnormalities, and elevated LV end diastolic pressure. Pt was transferred to [facility] for cardiac cath evaluation. Patient without any acute complaints, complaining of intermittent palpitations for the last 2 days. No chest pain, SOB, fevers, nausea, vomiting, abdominal pain.  (13 Oct 2023 21:05)       INTERVAL HPI/OVERNIGHT EVENTS:   s/p swan overnight, briefly on dobutamine, now off, remains afebrile and HDS    Subjective: No acute complaints, denies f/cp/sob/n/v/d/c/gu sx.    ICU Vital Signs Last 24 Hrs  T(C): 37.8 (21 Oct 2023 07:00), Max: 37.8 (21 Oct 2023 07:00)  T(F): 100 (21 Oct 2023 07:00), Max: 100 (21 Oct 2023 07:00)  HR: 126 (21 Oct 2023 07:30) (116 - 127)  BP: 113/79 (20 Oct 2023 19:01) (101/76 - 135/85)  BP(mean): 82 (20 Oct 2023 19:01) (82 - 103)  ABP: 110/65 (21 Oct 2023 07:30) (96/37 - 127/70)  ABP(mean): 79 (21 Oct 2023 07:30) (55 - 86)  RR: 24 (21 Oct 2023 07:30) (18 - 36)  SpO2: 94% (21 Oct 2023 07:30) (90% - 99%)    O2 Parameters below as of 21 Oct 2023 07:30  Patient On (Oxygen Delivery Method): nasal cannula  O2 Flow (L/min): 4        I&O's Summary    20 Oct 2023 07:01  -  21 Oct 2023 07:00  --------------------------------------------------------  IN: 403.6 mL / OUT: 1200 mL / NET: -796.4 mL          Daily     Daily Weight in k.4 (21 Oct 2023 05:00)    Adult Advanced Hemodynamics Last 24 Hrs  CVP(mm Hg): --  CVP(cm H2O): --  CO: --  CI: --  PA: --  PA(mean): --  PCWP: --  SVR: --  SVRI: --  PVR: --  PVRI: --    EKG/Telemetry Events:    MEDICATIONS  (STANDING):  aspirin enteric coated 81 milliGRAM(s) Oral daily  atorvastatin 80 milliGRAM(s) Oral at bedtime  buMETAnide IVPB 4 milliGRAM(s) IV Intermittent two times a day  dextrose 5%. 1000 milliLiter(s) (50 mL/Hr) IV Continuous <Continuous>  dextrose 5%. 1000 milliLiter(s) (100 mL/Hr) IV Continuous <Continuous>  dextrose 50% Injectable 25 Gram(s) IV Push once  dextrose 50% Injectable 12.5 Gram(s) IV Push once  dextrose 50% Injectable 25 Gram(s) IV Push once  glucagon  Injectable 1 milliGRAM(s) IntraMuscular once  heparin   Injectable 5000 Unit(s) SubCutaneous every 8 hours  hydrALAZINE 25 milliGRAM(s) Oral every 8 hours  insulin glargine Injectable (LANTUS) 30 Unit(s) SubCutaneous at bedtime  insulin lispro (ADMELOG) corrective regimen sliding scale   SubCutaneous at bedtime  insulin lispro (ADMELOG) corrective regimen sliding scale   SubCutaneous three times a day before meals  insulin lispro Injectable (ADMELOG) 10 Unit(s) SubCutaneous three times a day before meals  pantoprazole    Tablet 40 milliGRAM(s) Oral before breakfast  senna 2 Tablet(s) Oral at bedtime  tamsulosin 0.4 milliGRAM(s) Oral at bedtime    MEDICATIONS  (PRN):  acetaminophen     Tablet .. 650 milliGRAM(s) Oral every 6 hours PRN Temp greater or equal to 38C (100.4F), Mild Pain (1 - 3)  albuterol    90 MICROgram(s) HFA Inhaler 2 Puff(s) Inhalation every 6 hours PRN Shortness of Breath and/or Wheezing  benzocaine/menthol Lozenge 1 Lozenge Oral every 2 hours PRN Sore Throat  dextrose Oral Gel 15 Gram(s) Oral once PRN Blood Glucose LESS THAN 70 milliGRAM(s)/deciliter  guaiFENesin Oral Liquid (Sugar-Free) 100 milliGRAM(s) Oral every 6 hours PRN Cough  polyethylene glycol 3350 17 Gram(s) Oral daily PRN Constipation      PHYSICAL EXAM:  GENERAL:   HEAD:  Atraumatic, Normocephalic  EYES: EOMI, PERRLA, conjunctiva and sclera clear  NECK: Supple, No JVD, Normal thyroid, no enlarged nodes  NERVOUS SYSTEM:  Alert & Awake.   CHEST/LUNG: B/L good air entry; No rales, rhonchi, or wheezing  HEART: S1S2 normal, no S3, Regular rate and rhythm; No murmurs  ABDOMEN: Soft, Nontender, Nondistended; Bowel sounds present  EXTREMITIES:  2+ Peripheral Pulses, No clubbing, cyanosis, or edema  LYMPH: No lymphadenopathy noted  SKIN: No rashes or lesions    LABS:                        11.3   10.55 )-----------( 247      ( 21 Oct 2023 01:39 )             35.5     10-    133<L>  |  99  |  38<H>  ----------------------------<  152<H>  4.5   |  15<L>  |  1.87<H>    Ca    9.0      21 Oct 2023 01:39  Phos  5.8     10-  Mg     2.0     10-    TPro  6.5  /  Alb  3.3  /  TBili  0.4  /  DBili  x   /  AST  40  /  ALT  33  /  AlkPhos  76  10-    LIVER FUNCTIONS - ( 21 Oct 2023 01:39 )  Alb: 3.3 g/dL / Pro: 6.5 g/dL / ALK PHOS: 76 U/L / ALT: 33 U/L / AST: 40 U/L / GGT: x           PT/INR - ( 21 Oct 2023 01:39 )   PT: 13.7 sec;   INR: 1.32 ratio         PTT - ( 21 Oct 2023 01:39 )  PTT:26.1 sec  CAPILLARY BLOOD GLUCOSE      POCT Blood Glucose.: 153 mg/dL (20 Oct 2023 22:57)  POCT Blood Glucose.: 157 mg/dL (20 Oct 2023 18:36)  POCT Blood Glucose.: 150 mg/dL (20 Oct 2023 16:59)  POCT Blood Glucose.: 173 mg/dL (20 Oct 2023 12:28)  POCT Blood Glucose.: 187 mg/dL (20 Oct 2023 11:20)  POCT Blood Glucose.: 254 mg/dL (20 Oct 2023 08:13)    ABG - ( 21 Oct 2023 01:30 )  pH, Arterial: 7.38  pH, Blood: x     /  pCO2: 30    /  pO2: 108   / HCO3: 18    / Base Excess: -6.3  /  SaO2: 98.2                    Urinalysis Basic - ( 21 Oct 2023 01:39 )    Color: x / Appearance: x / SG: x / pH: x  Gluc: 152 mg/dL / Ketone: x  / Bili: x / Urobili: x   Blood: x / Protein: x / Nitrite: x   Leuk Esterase: x / RBC: x / WBC x   Sq Epi: x / Non Sq Epi: x / Bacteria: x          RADIOLOGY & ADDITIONAL TESTS:  CXR:        Care Discussed with Consultants/Other Providers [ x] YES  [ ] NO

## 2023-10-21 NOTE — PROGRESS NOTE ADULT - PROBLEM SELECTOR PLAN 2
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  - currently being evaluated for high risk PCI.

## 2023-10-21 NOTE — PROGRESS NOTE ADULT - ASSESSMENT
59 yo Male pmhx CVA with mild left sided deficits, HTN, DM2, vertigo, HLD, Mobitz II s/pp Medtronic dual chamber ICD (12/21/22) initially presented to OSH for SOB c/f ADHF vs. PNA, later found to have NSTEMI transferred to [facility] for LHC evaluation. s/p LHC on 10/16 w/ 3v disease and RHC on 10/19 for hemodynamic assessment. Admitted to CICU for CABG eval vs. advanced therapy vs. high risk PCI    ======NEURO======  #Mental status  - Baseline AAOx3, currently at baseline    #Hx of CVA w/ mild L residual deficit  - Neurological assessment per protocol     ======CV======  #CAD  #HTN  #HFrEF  Likely iso ischemic insult  ECHO 10/16: EF 20%, severely reduced LVSF  C 10/16: 95% pLAD, 80% mid and distal LCx, 90% mid and distal RCA  C 10/19: RA 10, PCWP 20, CO 4.4, CI 2.3  CMR 10/18: limited viability in LAD territory  s/p [place] (10/21 - ): CI 2.4 off dobutamine    - f/u HF, CTS recs regarding Advance therapy vs. CABG vs. high risk PCI  - c/w ASA 81, Atorv 80mg  - c/w Bumex drip  - s/p Dobutamine (10/20 overnight); Calculated CI > 2.1 off dobutamine, can consider dc [place] soon  - c/w Hydralazine 25 q8h for AF reduction  - avoid BB/CCB given borderline cardiac function  - Start GDMT when appropriate    #Tachycardia  #Mobitz II s/p AICD  - Likely reflex tachycardiac iso hypoxia +/- NSTEMI +/- ADHF  - Continue to monitor    #HLD  - c/w Atorv 80mg     ======PULM======  #Pulmonary edema  - Oxygen therapy PRN keep O2%>92%  - c/w Bumex drip  - Albuterol PRN    ======RENAL======  #LAURA:   sCr on admission 1.28, uptrending likely iso diuresis, NSTEMI and HFrEF  - Monitor I/O  - Trend sCr  - Renally dose meds, avoid nephrotoxins    #Hyponatremia  - mild hypoNa to 133 noted on 10/20, asymptomatic  - likely iso CHF +/- LAURA  - Continue to trend    #Elevated lactate  - uptrending lactate noted likely iso HFrEF  - monitr VS  - trend lactate    #BPH  - c/w Flomax 0.4mg qhs    ======GI======  - No active issues  - CC DASH Halal diet  - GI ppx: PPI 40mg daily  - c/w Bowel regimen    ======ENDO======  #DM2  - A1c 10/14 8.4%  - c/w Lantus 30u qhs + lispro 10u premeal + ISS  - FS    ======HEMATOLOGIC======  - No active issues  - DVT ppx: HSQ iso LAURA    ======ID======  - No active issues  - Recent admission to OSH for possible PNA s/p 14 days of cefepime (last dose 10/14)  - Currently no signs of infection, WBC 10.6, afebrile, no indications for abx    Lines 57 yo Male pmhx CVA with mild left sided deficits, HTN, DM2, vertigo, HLD, Mobitz II s/pp Medtronic dual chamber ICD (12/21/22) initially presented to OSH for SOB c/f ADHF vs. PNA, later found to have NSTEMI transferred to [facility] for LHC evaluation. s/p LHC on 10/16 w/ 3v disease and RHC on 10/19 for hemodynamic assessment. Admitted to CICU for CABG eval vs. advanced therapy vs. high risk PCI    ======NEURO======  #Mental status  - Baseline AAOx3, currently at baseline    #Hx of CVA w/ mild L residual deficit  - Neurological assessment per protocol     ======CV======  #CAD  #HTN  #HFrEF  Likely iso ischemic insult  ECHO 10/16: EF 20%, severely reduced LVSF  C 10/16: 95% pLAD, 80% mid and distal LCx, 90% mid and distal RCA  C 10/19: RA 10, PCWP 20, CO 4.4, CI 2.3  CMR 10/18: limited viability in LAD territory  s/p [place] (10/21 - )    - f/u HF, CTS recs regarding Advance therapy vs. CABG vs. high risk PCI  - c/w ASA 81, Atorv 80mg  - c/w Bumex drip  - s/p Dobutamine (10/20 overnight)  - c/w Hydralazine 25 q8h for AF reduction  - avoid BB/CCB given borderline cardiac function  - Start GDMT when appropriate    #Tachycardia  #Mobitz II s/p AICD  - Likely reflex tachycardiac iso hypoxia +/- NSTEMI +/- ADHF  - Continue to monitor    #HLD  - c/w Atorv 80mg     ======PULM======  #Pulmonary edema  - Oxygen therapy PRN keep O2%>92%  - c/w Bumex drip  - Albuterol PRN    ======RENAL======  #LAURA:   sCr on admission 1.28, uptrending likely iso diuresis, NSTEMI and HFrEF  - Monitor I/O  - Trend BUN and creatinine  - Renally dose meds, avoid nephrotoxins    #Hyponatremia  - mild hypoNa to 133 noted on 10/20, asymptomatic  - likely iso CHF +/- LAURA  - Continue to trend    #BPH  - c/w Flomax 0.4mg qhs    ======GI======  - No active issues  - CC DASH Halal diet  - GI ppx: PPI 40mg daily  - c/w Bowel regimen    ======ENDO======  #DM2  - A1c 10/14 8.4%  - c/w Lantus 30u qhs + lispro 10u premeal + ISS  - FS    ======HEMATOLOGIC======  - No active issues  - DVT ppx: HSQ iso LAURA    ======ID======  - No active issues  - Recent admission to OSH for possible PNA s/p 14 days of cefepime (last dose 10/14)  - Currently no signs of infection, WBC 10.6, afebrile, no indications for abx    Lines:  Rt IJ PA Cath 10/21  RT IJ double lumen cordis 10/21  Lt radial a line 10/20 59 yo Male pmhx CVA with mild left sided deficits, HTN, DM2, vertigo, HLD, Mobitz II s/pp Medtronic dual chamber ICD (12/21/22) initially presented to OSH for SOB c/f ADHF vs. PNA, later found to have NSTEMI transferred to [facility] for LHC evaluation. s/p LHC on 10/16 w/ 3v disease and RHC on 10/19 for hemodynamic assessment. Admitted to CICU for CABG eval vs. advanced therapy vs. high risk PCI    ======NEURO======  #Mental status  - Baseline AAOx3, currently at baseline    #Hx of CVA w/ mild L residual deficit  - Neurological assessment per protocol     ======CV======  #CAD  #HTN  #HFrEF  Likely iso ischemic insult  ECHO 10/16: EF 20%, severely reduced LVSF  C 10/16: 95% pLAD, 80% mid and distal LCx, 90% mid and distal RCA  C 10/19: RA 10, PCWP 20, CO 4.4, CI 2.3  CMR 10/18: limited viability in LAD territory  s/p [place] (10/21 - )    - f/u HF, CTS recs regarding Advance therapy vs. CABG vs. high risk PCI  - c/w ASA 81, Atorv 80mg  - c/w Bumex drip  - s/p Dobutamine (10/20 overnight)  - c/w Hydralazine 25 q8h for AF reduction  - avoid BB/CCB given borderline cardiac function  - Start GDMT when appropriate    #Tachycardia  #Mobitz II s/p AICD  - Likely reflex tachycardiac iso hypoxia +/- NSTEMI +/- ADHF  - Continue to monitor    #HLD  - c/w Atorv 80mg     ======PULM======  #Pulmonary edema  - Oxygen therapy PRN keep O2%>92%  - c/w Bumex drip  - Albuterol PRN    ======RENAL======  #LAURA:   sCr on admission 1.28, uptrending likely iso diuresis, NSTEMI and HFrEF  - Monitor I/O  - Trend BUN and creatinine  - Renally dose meds, avoid nephrotoxins  - Goal negative 2L over 24hrs    #Hyponatremia  - mild hypoNa to 133 noted on 10/20, asymptomatic  - likely iso CHF +/- LAURA  - Continue to trend    #BPH  - c/w Flomax 0.4mg qhs    ======GI======  - No active issues  - CC DASH Halal diet  - GI ppx: PPI 40mg daily  - c/w Bowel regimen    ======ENDO======  #DM2  - A1c 10/14 8.4%  - c/w Lantus 30u qhs + lispro 10u premeal + ISS  - FS    ======HEMATOLOGIC======  - No active issues  - DVT ppx: HSQ iso LAURA    ======ID======  - No active issues  - Recent admission to OSH for possible PNA s/p 14 days of cefepime (last dose 10/14)  - Currently no signs of infection, WBC 10.6, afebrile, no indications for abx    Lines:  Rt IJ PA Cath 10/21  RT IJ double lumen cordis 10/21  Lt radial a line 10/20

## 2023-10-21 NOTE — PROGRESS NOTE ADULT - ATTENDING COMMENTS
59 YO M with a history of CVA with residual mild R paresthesias, second degree Mobitz II heart block s/p DC-ICD 12/2022 (initial concern for sarcoid but negative biopsy/PET post procedure), DM2 (A1c 8.4%), and HTN who was admitted to [facility] with chest pain and dyspnea, found to have NSTEMI with markedly elevated troponins and new severe LV dysfunction with regional wall motion abnormalities as well as + enterovirus. He required NIPPV and was diuresed before being transferred to [facility] where LHC performed which revealed severe 3v CAD with critical proximal LAD involvement. CTS was consulted for CABG evaluation and HF consulted for comanagement.    He had concerning features including resting tachcyardia and poor non-invasive hemodynamics His cMRI also showed mostly nonviable LAD territory. He underwent RHC which showed moderately elevated L > R filing pressures and low-normal cardiac output and was being considered for PCI however he decompensated 10/20 with volume overload and signs of shock after beta blocker was increased for sinus tachycardia. He was transferred to CICU and began to improve. His current hemodynamics suggest severe volume overload with preserved cardiac output.     He may need advanced therapies evaluation this admission but will try to optimize pharmacologically and can consider PCI or RCA/LCx if he improves.     HEMODYNAMICS  10/21: RA 13, PA 50/33, PCWP 33, PA sat 66% with Cris CO/CI 4.4/2.4, BP 96/59 (70) with SVR 1036    REVIEW OF STUDIES  EKG: sinus tachycardia, septal q-waves, left axis deviation   TTE 10/16/23: LV 5.5 cm, LVEF 20% with regional WMAs worse in the apex, LVOT VTI 8 cm, normal RV size/function, severe functional MR, small pericardial effusion, estimated RA pressure 8 mmHg   TTE 12/2022: normal LVEF   LHC: 95% discrete proximal LAD disease and sequential multifocal disease with good distal target, 80-90% proximal LCx disease just prior to marginals, severe multifocal RCA disease with good targets   cMRI: LVEF 13%, greater than 50% LGE in mid LAD territory     PLAN  # Cardiogenic shock  - Improving, now off inotropes  - Continue PAC for now     # Acute systolic heart failure  - Etiology: ischemic  - GDMT: stop BB given initial signs of low output, continue hydralizine 25 mg TID. deferring RAASi/MRA/SGLT2i given fluctuating renal function   - Diuretics: augment to bumex drip at 2 mg/hr, goal CVP 6-8  - Device: has ICD in place and notably with high burden of RV pacing, pending ultimate decision may need to consider CRT upgrade or epicardial LV lead   - Advanced therapies: severe LV dysfunction with tachycardia and poor non-invasive hemodynamics with limited viability concerning. will attempt to optimize hemodynamics with PAC guided therapy and can consider PCI or LCx/RCA. if persistent poor hemodynamics will launch VAD/transplant evaluation. has good support and no clear psychosocial red flags though appears to have some cognitive defects. ABO AB. would need neurologic assessment with prior CVA and ideally improved conditioning given ongoing prolonged hospitalization.     # Severe functional MR  - needs reassessment after revascularization/GDMT    # Severe 3v CAD  - On ASA/statin, cMRI with limited LAD viability.  - Deemed too high risk for CABG  - Possible PCI pending clinical progress.

## 2023-10-21 NOTE — PROGRESS NOTE ADULT - SUBJECTIVE AND OBJECTIVE BOX
Subjective:  No acute events. Feels better.    Medications:  acetaminophen     Tablet .. 650 milliGRAM(s) Oral every 6 hours PRN  albuterol    90 MICROgram(s) HFA Inhaler 2 Puff(s) Inhalation every 6 hours PRN  aspirin enteric coated 81 milliGRAM(s) Oral daily  atorvastatin 80 milliGRAM(s) Oral at bedtime  benzocaine/menthol Lozenge 1 Lozenge Oral every 2 hours PRN  buMETAnide Infusion 2 mG/Hr IV Continuous <Continuous>  dextrose 5%. 1000 milliLiter(s) IV Continuous <Continuous>  dextrose 5%. 1000 milliLiter(s) IV Continuous <Continuous>  dextrose 50% Injectable 12.5 Gram(s) IV Push once  dextrose 50% Injectable 25 Gram(s) IV Push once  dextrose 50% Injectable 25 Gram(s) IV Push once  dextrose Oral Gel 15 Gram(s) Oral once PRN  glucagon  Injectable 1 milliGRAM(s) IntraMuscular once  guaiFENesin Oral Liquid (Sugar-Free) 100 milliGRAM(s) Oral every 6 hours PRN  heparin   Injectable 5000 Unit(s) SubCutaneous every 8 hours  hydrALAZINE 25 milliGRAM(s) Oral every 8 hours  insulin glargine Injectable (LANTUS) 30 Unit(s) SubCutaneous at bedtime  insulin lispro (ADMELOG) corrective regimen sliding scale   SubCutaneous three times a day before meals  insulin lispro (ADMELOG) corrective regimen sliding scale   SubCutaneous at bedtime  insulin lispro Injectable (ADMELOG) 10 Unit(s) SubCutaneous three times a day before meals  pantoprazole    Tablet 40 milliGRAM(s) Oral before breakfast  polyethylene glycol 3350 17 Gram(s) Oral daily PRN  senna 2 Tablet(s) Oral at bedtime  tamsulosin 0.4 milliGRAM(s) Oral at bedtime    Vitals:  T(C): 37.8 (10-21-23 @ 07:00), Max: 37.8 (10-21-23 @ 07:00)  HR: 126 (10-21-23 @ 11:00) (116 - 127)  BP: 113/79 (10-20-23 @ 19:01) (101/76 - 135/85)  BP(mean): 82 (10-20-23 @ 19:01) (82 - 103)  ABP: 95/58 (10-21-23 @ 11:00) (86/51 - 127/70)  ABP(mean): 68 (10-21-23 @ 11:00) (55 - 86)  RR: 25 (10-21-23 @ 11:00) (15 - 36)  SpO2: 95% (10-21-23 @ 11:00) (90% - 99%)      I&O's Summary    20 Oct 2023 07:01  -  21 Oct 2023 07:00  --------------------------------------------------------  IN: 403.6 mL / OUT: 1200 mL / NET: -796.4 mL    21 Oct 2023 07:01  -  21 Oct 2023 12:11  --------------------------------------------------------  IN: 240 mL / OUT: 430 mL / NET: -190 mL        Physical Exam:  Appearance: No Acute Distress  Neck: elevated JVD  Chest: CTAB  CV: Normal S1 and S2.  Abdomen: Soft, non-distended, non-tender  Extremities: cool to touch, 1+ edema   Neurology: Alert and oriented times three.       Labs:                        11.3   10.55 )-----------( 247      ( 21 Oct 2023 01:39 )             35.5     10-21    135  |  101  |  41<H>  ----------------------------<  161<H>  4.0   |  19<L>  |  1.90<H>    Ca    8.8      21 Oct 2023 09:55  Phos  5.0     10-21  Mg     1.9     10-21    TPro  6.4  /  Alb  3.1<L>  /  TBili  0.3  /  DBili  x   /  AST  33  /  ALT  32  /  AlkPhos  70  10-21    Oxygen Saturation, Mixed: 68.2 (10-21 @ 09:30)  Oxygen Saturation, Mixed: 65.6 (10-21 @ 03:10)    Lactate, Blood: 3.5 mmol/L (10-20 @ 12:32)

## 2023-10-22 LAB
ALBUMIN SERPL ELPH-MCNC: 3.2 G/DL — LOW (ref 3.3–5)
ALP SERPL-CCNC: 69 U/L — SIGNIFICANT CHANGE UP (ref 40–120)
ALT FLD-CCNC: 36 U/L — SIGNIFICANT CHANGE UP (ref 10–45)
ANION GAP SERPL CALC-SCNC: 14 MMOL/L — SIGNIFICANT CHANGE UP (ref 5–17)
ANION GAP SERPL CALC-SCNC: 14 MMOL/L — SIGNIFICANT CHANGE UP (ref 5–17)
ANION GAP SERPL CALC-SCNC: 16 MMOL/L — SIGNIFICANT CHANGE UP (ref 5–17)
ANION GAP SERPL CALC-SCNC: 16 MMOL/L — SIGNIFICANT CHANGE UP (ref 5–17)
APTT BLD: 32 SEC — SIGNIFICANT CHANGE UP (ref 24.5–35.6)
APTT BLD: 32 SEC — SIGNIFICANT CHANGE UP (ref 24.5–35.6)
AST SERPL-CCNC: 24 U/L — SIGNIFICANT CHANGE UP (ref 10–40)
AST SERPL-CCNC: 24 U/L — SIGNIFICANT CHANGE UP (ref 10–40)
AST SERPL-CCNC: 30 U/L — SIGNIFICANT CHANGE UP (ref 10–40)
AST SERPL-CCNC: 30 U/L — SIGNIFICANT CHANGE UP (ref 10–40)
BASE EXCESS BLDMV CALC-SCNC: -0.2 MMOL/L — SIGNIFICANT CHANGE UP (ref -3–3)
BASE EXCESS BLDMV CALC-SCNC: -0.2 MMOL/L — SIGNIFICANT CHANGE UP (ref -3–3)
BASE EXCESS BLDMV CALC-SCNC: -0.5 MMOL/L — SIGNIFICANT CHANGE UP (ref -3–3)
BASE EXCESS BLDMV CALC-SCNC: -0.5 MMOL/L — SIGNIFICANT CHANGE UP (ref -3–3)
BASE EXCESS BLDMV CALC-SCNC: 0 MMOL/L — SIGNIFICANT CHANGE UP (ref -3–3)
BASE EXCESS BLDMV CALC-SCNC: 0 MMOL/L — SIGNIFICANT CHANGE UP (ref -3–3)
BASE EXCESS BLDV CALC-SCNC: 1 MMOL/L — SIGNIFICANT CHANGE UP (ref -2–3)
BASE EXCESS BLDV CALC-SCNC: 1 MMOL/L — SIGNIFICANT CHANGE UP (ref -2–3)
BILIRUB SERPL-MCNC: 0.3 MG/DL — SIGNIFICANT CHANGE UP (ref 0.2–1.2)
BILIRUB SERPL-MCNC: 0.3 MG/DL — SIGNIFICANT CHANGE UP (ref 0.2–1.2)
BILIRUB SERPL-MCNC: 0.4 MG/DL — SIGNIFICANT CHANGE UP (ref 0.2–1.2)
BILIRUB SERPL-MCNC: 0.4 MG/DL — SIGNIFICANT CHANGE UP (ref 0.2–1.2)
BUN SERPL-MCNC: 48 MG/DL — HIGH (ref 7–23)
BUN SERPL-MCNC: 48 MG/DL — HIGH (ref 7–23)
BUN SERPL-MCNC: 50 MG/DL — HIGH (ref 7–23)
BUN SERPL-MCNC: 50 MG/DL — HIGH (ref 7–23)
CA-I SERPL-SCNC: 1.14 MMOL/L — LOW (ref 1.15–1.33)
CA-I SERPL-SCNC: 1.14 MMOL/L — LOW (ref 1.15–1.33)
CALCIUM SERPL-MCNC: 8.7 MG/DL — SIGNIFICANT CHANGE UP (ref 8.4–10.5)
CALCIUM SERPL-MCNC: 8.7 MG/DL — SIGNIFICANT CHANGE UP (ref 8.4–10.5)
CALCIUM SERPL-MCNC: 8.8 MG/DL — SIGNIFICANT CHANGE UP (ref 8.4–10.5)
CALCIUM SERPL-MCNC: 8.8 MG/DL — SIGNIFICANT CHANGE UP (ref 8.4–10.5)
CHLORIDE BLDV-SCNC: 104 MMOL/L — SIGNIFICANT CHANGE UP (ref 96–108)
CHLORIDE BLDV-SCNC: 104 MMOL/L — SIGNIFICANT CHANGE UP (ref 96–108)
CHLORIDE SERPL-SCNC: 103 MMOL/L — SIGNIFICANT CHANGE UP (ref 96–108)
CHLORIDE SERPL-SCNC: 103 MMOL/L — SIGNIFICANT CHANGE UP (ref 96–108)
CHLORIDE SERPL-SCNC: 104 MMOL/L — SIGNIFICANT CHANGE UP (ref 96–108)
CHLORIDE SERPL-SCNC: 104 MMOL/L — SIGNIFICANT CHANGE UP (ref 96–108)
CO2 BLDMV-SCNC: 25 MMOL/L — SIGNIFICANT CHANGE UP (ref 21–29)
CO2 BLDMV-SCNC: 25 MMOL/L — SIGNIFICANT CHANGE UP (ref 21–29)
CO2 BLDMV-SCNC: 26 MMOL/L — SIGNIFICANT CHANGE UP (ref 21–29)
CO2 BLDV-SCNC: 27 MMOL/L — HIGH (ref 22–26)
CO2 BLDV-SCNC: 27 MMOL/L — HIGH (ref 22–26)
CO2 SERPL-SCNC: 19 MMOL/L — LOW (ref 22–31)
CO2 SERPL-SCNC: 19 MMOL/L — LOW (ref 22–31)
CO2 SERPL-SCNC: 22 MMOL/L — SIGNIFICANT CHANGE UP (ref 22–31)
CO2 SERPL-SCNC: 22 MMOL/L — SIGNIFICANT CHANGE UP (ref 22–31)
CREAT SERPL-MCNC: 1.9 MG/DL — HIGH (ref 0.5–1.3)
CREAT SERPL-MCNC: 1.9 MG/DL — HIGH (ref 0.5–1.3)
CREAT SERPL-MCNC: 1.97 MG/DL — HIGH (ref 0.5–1.3)
CREAT SERPL-MCNC: 1.97 MG/DL — HIGH (ref 0.5–1.3)
EGFR: 39 ML/MIN/1.73M2 — LOW
EGFR: 39 ML/MIN/1.73M2 — LOW
EGFR: 41 ML/MIN/1.73M2 — LOW
EGFR: 41 ML/MIN/1.73M2 — LOW
GAS PNL BLDA: SIGNIFICANT CHANGE UP
GAS PNL BLDA: SIGNIFICANT CHANGE UP
GAS PNL BLDMV: SIGNIFICANT CHANGE UP
GAS PNL BLDV: 136 MMOL/L — SIGNIFICANT CHANGE UP (ref 136–145)
GAS PNL BLDV: 136 MMOL/L — SIGNIFICANT CHANGE UP (ref 136–145)
GAS PNL BLDV: SIGNIFICANT CHANGE UP
GLUCOSE BLDC GLUCOMTR-MCNC: 121 MG/DL — HIGH (ref 70–99)
GLUCOSE BLDC GLUCOMTR-MCNC: 121 MG/DL — HIGH (ref 70–99)
GLUCOSE BLDC GLUCOMTR-MCNC: 139 MG/DL — HIGH (ref 70–99)
GLUCOSE BLDC GLUCOMTR-MCNC: 139 MG/DL — HIGH (ref 70–99)
GLUCOSE BLDC GLUCOMTR-MCNC: 91 MG/DL — SIGNIFICANT CHANGE UP (ref 70–99)
GLUCOSE BLDV-MCNC: 207 MG/DL — HIGH (ref 70–99)
GLUCOSE BLDV-MCNC: 207 MG/DL — HIGH (ref 70–99)
GLUCOSE SERPL-MCNC: 209 MG/DL — HIGH (ref 70–99)
GLUCOSE SERPL-MCNC: 209 MG/DL — HIGH (ref 70–99)
GLUCOSE SERPL-MCNC: 92 MG/DL — SIGNIFICANT CHANGE UP (ref 70–99)
GLUCOSE SERPL-MCNC: 92 MG/DL — SIGNIFICANT CHANGE UP (ref 70–99)
HCO3 BLDMV-SCNC: 24 MMOL/L — SIGNIFICANT CHANGE UP (ref 20–28)
HCO3 BLDMV-SCNC: 24 MMOL/L — SIGNIFICANT CHANGE UP (ref 20–28)
HCO3 BLDMV-SCNC: 25 MMOL/L — SIGNIFICANT CHANGE UP (ref 20–28)
HCO3 BLDV-SCNC: 26 MMOL/L — SIGNIFICANT CHANGE UP (ref 22–29)
HCO3 BLDV-SCNC: 26 MMOL/L — SIGNIFICANT CHANGE UP (ref 22–29)
HCT VFR BLD CALC: 34.2 % — LOW (ref 39–50)
HCT VFR BLD CALC: 34.2 % — LOW (ref 39–50)
HCT VFR BLDA CALC: 36 % — LOW (ref 39–51)
HCT VFR BLDA CALC: 36 % — LOW (ref 39–51)
HGB BLD CALC-MCNC: 12 G/DL — LOW (ref 12.6–17.4)
HGB BLD CALC-MCNC: 12 G/DL — LOW (ref 12.6–17.4)
HGB BLD-MCNC: 11.1 G/DL — LOW (ref 13–17)
HGB BLD-MCNC: 11.1 G/DL — LOW (ref 13–17)
INR BLD: 1.23 RATIO — HIGH (ref 0.85–1.18)
INR BLD: 1.23 RATIO — HIGH (ref 0.85–1.18)
LACTATE BLDV-MCNC: 1.1 MMOL/L — SIGNIFICANT CHANGE UP (ref 0.5–2)
LACTATE BLDV-MCNC: 1.1 MMOL/L — SIGNIFICANT CHANGE UP (ref 0.5–2)
MAGNESIUM SERPL-MCNC: 2 MG/DL — SIGNIFICANT CHANGE UP (ref 1.6–2.6)
MAGNESIUM SERPL-MCNC: 2 MG/DL — SIGNIFICANT CHANGE UP (ref 1.6–2.6)
MCHC RBC-ENTMCNC: 25.6 PG — LOW (ref 27–34)
MCHC RBC-ENTMCNC: 25.6 PG — LOW (ref 27–34)
MCHC RBC-ENTMCNC: 32.5 GM/DL — SIGNIFICANT CHANGE UP (ref 32–36)
MCHC RBC-ENTMCNC: 32.5 GM/DL — SIGNIFICANT CHANGE UP (ref 32–36)
MCV RBC AUTO: 79 FL — LOW (ref 80–100)
MCV RBC AUTO: 79 FL — LOW (ref 80–100)
NRBC # BLD: 0 /100 WBCS — SIGNIFICANT CHANGE UP (ref 0–0)
NRBC # BLD: 0 /100 WBCS — SIGNIFICANT CHANGE UP (ref 0–0)
O2 CT VFR BLD CALC: 37 MMHG — SIGNIFICANT CHANGE UP (ref 30–65)
O2 CT VFR BLD CALC: 37 MMHG — SIGNIFICANT CHANGE UP (ref 30–65)
O2 CT VFR BLD CALC: 42 MMHG — SIGNIFICANT CHANGE UP (ref 30–65)
O2 CT VFR BLD CALC: 42 MMHG — SIGNIFICANT CHANGE UP (ref 30–65)
O2 CT VFR BLD CALC: 46 MMHG — SIGNIFICANT CHANGE UP (ref 30–65)
O2 CT VFR BLD CALC: 46 MMHG — SIGNIFICANT CHANGE UP (ref 30–65)
PCO2 BLDMV: 39 MMHG — SIGNIFICANT CHANGE UP (ref 30–65)
PCO2 BLDMV: 39 MMHG — SIGNIFICANT CHANGE UP (ref 30–65)
PCO2 BLDMV: 40 MMHG — SIGNIFICANT CHANGE UP (ref 30–65)
PCO2 BLDMV: 40 MMHG — SIGNIFICANT CHANGE UP (ref 30–65)
PCO2 BLDMV: 41 MMHG — SIGNIFICANT CHANGE UP (ref 30–65)
PCO2 BLDMV: 41 MMHG — SIGNIFICANT CHANGE UP (ref 30–65)
PCO2 BLDV: 42 MMHG — SIGNIFICANT CHANGE UP (ref 42–55)
PCO2 BLDV: 42 MMHG — SIGNIFICANT CHANGE UP (ref 42–55)
PH BLDMV: 7.39 — SIGNIFICANT CHANGE UP (ref 7.32–7.45)
PH BLDMV: 7.39 — SIGNIFICANT CHANGE UP (ref 7.32–7.45)
PH BLDMV: 7.4 — SIGNIFICANT CHANGE UP (ref 7.32–7.45)
PH BLDV: 7.4 — SIGNIFICANT CHANGE UP (ref 7.32–7.43)
PH BLDV: 7.4 — SIGNIFICANT CHANGE UP (ref 7.32–7.43)
PHOSPHATE SERPL-MCNC: 5.1 MG/DL — HIGH (ref 2.5–4.5)
PHOSPHATE SERPL-MCNC: 5.1 MG/DL — HIGH (ref 2.5–4.5)
PLATELET # BLD AUTO: 200 K/UL — SIGNIFICANT CHANGE UP (ref 150–400)
PLATELET # BLD AUTO: 200 K/UL — SIGNIFICANT CHANGE UP (ref 150–400)
PO2 BLDV: 36 MMHG — SIGNIFICANT CHANGE UP (ref 25–45)
PO2 BLDV: 36 MMHG — SIGNIFICANT CHANGE UP (ref 25–45)
POTASSIUM BLDV-SCNC: 4 MMOL/L — SIGNIFICANT CHANGE UP (ref 3.5–5.1)
POTASSIUM BLDV-SCNC: 4 MMOL/L — SIGNIFICANT CHANGE UP (ref 3.5–5.1)
POTASSIUM SERPL-MCNC: 3.7 MMOL/L — SIGNIFICANT CHANGE UP (ref 3.5–5.3)
POTASSIUM SERPL-MCNC: 3.7 MMOL/L — SIGNIFICANT CHANGE UP (ref 3.5–5.3)
POTASSIUM SERPL-MCNC: 4 MMOL/L — SIGNIFICANT CHANGE UP (ref 3.5–5.3)
POTASSIUM SERPL-MCNC: 4 MMOL/L — SIGNIFICANT CHANGE UP (ref 3.5–5.3)
POTASSIUM SERPL-SCNC: 3.7 MMOL/L — SIGNIFICANT CHANGE UP (ref 3.5–5.3)
POTASSIUM SERPL-SCNC: 3.7 MMOL/L — SIGNIFICANT CHANGE UP (ref 3.5–5.3)
POTASSIUM SERPL-SCNC: 4 MMOL/L — SIGNIFICANT CHANGE UP (ref 3.5–5.3)
POTASSIUM SERPL-SCNC: 4 MMOL/L — SIGNIFICANT CHANGE UP (ref 3.5–5.3)
PROT SERPL-MCNC: 6.2 G/DL — SIGNIFICANT CHANGE UP (ref 6–8.3)
PROT SERPL-MCNC: 6.2 G/DL — SIGNIFICANT CHANGE UP (ref 6–8.3)
PROT SERPL-MCNC: 6.4 G/DL — SIGNIFICANT CHANGE UP (ref 6–8.3)
PROT SERPL-MCNC: 6.4 G/DL — SIGNIFICANT CHANGE UP (ref 6–8.3)
PROTHROM AB SERPL-ACNC: 13.5 SEC — HIGH (ref 9.5–13)
PROTHROM AB SERPL-ACNC: 13.5 SEC — HIGH (ref 9.5–13)
RBC # BLD: 4.33 M/UL — SIGNIFICANT CHANGE UP (ref 4.2–5.8)
RBC # BLD: 4.33 M/UL — SIGNIFICANT CHANGE UP (ref 4.2–5.8)
RBC # FLD: 15.9 % — HIGH (ref 10.3–14.5)
RBC # FLD: 15.9 % — HIGH (ref 10.3–14.5)
SAO2 % BLDMV: 59 — LOW (ref 60–90)
SAO2 % BLDMV: 59 — LOW (ref 60–90)
SAO2 % BLDMV: 68 — SIGNIFICANT CHANGE UP (ref 60–90)
SAO2 % BLDMV: 68 — SIGNIFICANT CHANGE UP (ref 60–90)
SAO2 % BLDMV: 71.1 — SIGNIFICANT CHANGE UP (ref 60–90)
SAO2 % BLDMV: 71.1 — SIGNIFICANT CHANGE UP (ref 60–90)
SAO2 % BLDV: 59.1 % — LOW (ref 67–88)
SAO2 % BLDV: 59.1 % — LOW (ref 67–88)
SODIUM SERPL-SCNC: 139 MMOL/L — SIGNIFICANT CHANGE UP (ref 135–145)
WBC # BLD: 12.19 K/UL — HIGH (ref 3.8–10.5)
WBC # BLD: 12.19 K/UL — HIGH (ref 3.8–10.5)
WBC # FLD AUTO: 12.19 K/UL — HIGH (ref 3.8–10.5)
WBC # FLD AUTO: 12.19 K/UL — HIGH (ref 3.8–10.5)

## 2023-10-22 PROCEDURE — 99291 CRITICAL CARE FIRST HOUR: CPT

## 2023-10-22 PROCEDURE — 71045 X-RAY EXAM CHEST 1 VIEW: CPT | Mod: 26

## 2023-10-22 PROCEDURE — 93451 RIGHT HEART CATH: CPT | Mod: 26

## 2023-10-22 PROCEDURE — 93010 ELECTROCARDIOGRAM REPORT: CPT

## 2023-10-22 PROCEDURE — 99291 CRITICAL CARE FIRST HOUR: CPT | Mod: 25

## 2023-10-22 RX ORDER — POTASSIUM CHLORIDE 20 MEQ
40 PACKET (EA) ORAL ONCE
Refills: 0 | Status: COMPLETED | OUTPATIENT
Start: 2023-10-22 | End: 2023-10-22

## 2023-10-22 RX ORDER — CHLORHEXIDINE GLUCONATE 213 G/1000ML
1 SOLUTION TOPICAL DAILY
Refills: 0 | Status: DISCONTINUED | OUTPATIENT
Start: 2023-10-22 | End: 2023-11-03

## 2023-10-22 RX ORDER — HYDRALAZINE HCL 50 MG
37.5 TABLET ORAL EVERY 8 HOURS
Refills: 0 | Status: DISCONTINUED | OUTPATIENT
Start: 2023-10-22 | End: 2023-10-26

## 2023-10-22 RX ADMIN — HEPARIN SODIUM 5000 UNIT(S): 5000 INJECTION INTRAVENOUS; SUBCUTANEOUS at 05:13

## 2023-10-22 RX ADMIN — Medication 10 UNIT(S): at 12:59

## 2023-10-22 RX ADMIN — Medication 40 MILLIEQUIVALENT(S): at 03:17

## 2023-10-22 RX ADMIN — Medication 2: at 12:58

## 2023-10-22 RX ADMIN — BUMETANIDE 10 MG/HR: 0.25 INJECTION INTRAMUSCULAR; INTRAVENOUS at 03:26

## 2023-10-22 RX ADMIN — Medication 81 MILLIGRAM(S): at 11:48

## 2023-10-22 RX ADMIN — SENNA PLUS 2 TABLET(S): 8.6 TABLET ORAL at 22:28

## 2023-10-22 RX ADMIN — BUMETANIDE 10 MG/HR: 0.25 INJECTION INTRAMUSCULAR; INTRAVENOUS at 00:35

## 2023-10-22 RX ADMIN — HEPARIN SODIUM 5000 UNIT(S): 5000 INJECTION INTRAVENOUS; SUBCUTANEOUS at 22:28

## 2023-10-22 RX ADMIN — TAMSULOSIN HYDROCHLORIDE 0.4 MILLIGRAM(S): 0.4 CAPSULE ORAL at 22:28

## 2023-10-22 RX ADMIN — Medication 10 UNIT(S): at 17:10

## 2023-10-22 RX ADMIN — ATORVASTATIN CALCIUM 80 MILLIGRAM(S): 80 TABLET, FILM COATED ORAL at 22:28

## 2023-10-22 RX ADMIN — BUMETANIDE 5 MG/HR: 0.25 INJECTION INTRAMUSCULAR; INTRAVENOUS at 18:03

## 2023-10-22 RX ADMIN — HEPARIN SODIUM 5000 UNIT(S): 5000 INJECTION INTRAVENOUS; SUBCUTANEOUS at 15:02

## 2023-10-22 RX ADMIN — PANTOPRAZOLE SODIUM 40 MILLIGRAM(S): 20 TABLET, DELAYED RELEASE ORAL at 05:13

## 2023-10-22 RX ADMIN — BUMETANIDE 10 MG/HR: 0.25 INJECTION INTRAMUSCULAR; INTRAVENOUS at 11:48

## 2023-10-22 RX ADMIN — Medication 37.5 MILLIGRAM(S): at 22:28

## 2023-10-22 RX ADMIN — Medication 25 MILLIGRAM(S): at 05:13

## 2023-10-22 RX ADMIN — Medication 10 UNIT(S): at 08:46

## 2023-10-22 RX ADMIN — Medication 37.5 MILLIGRAM(S): at 15:03

## 2023-10-22 RX ADMIN — INSULIN GLARGINE 30 UNIT(S): 100 INJECTION, SOLUTION SUBCUTANEOUS at 22:27

## 2023-10-22 RX ADMIN — CHLORHEXIDINE GLUCONATE 1 APPLICATION(S): 213 SOLUTION TOPICAL at 21:26

## 2023-10-22 NOTE — PROGRESS NOTE ADULT - ATTENDING COMMENTS
57 YO M with a history of CVA with residual mild R paresthesias, second degree Mobitz II heart block s/p DC-ICD 12/2022 (initial concern for sarcoid but negative biopsy/PET post procedure), DM2 (A1c 8.4%), and HTN who was admitted to [facility] with chest pain and dyspnea, found to have NSTEMI with markedly elevated troponins and new severe LV dysfunction with regional wall motion abnormalities as well as + enterovirus. He required NIPPV and was diuresed before being transferred to [facility] where LHC performed which revealed severe 3v CAD with critical proximal LAD involvement. CTS was consulted for CABG evaluation and HF consulted for comanagement.    He had concerning features including resting tachcyardia and poor non-invasive hemodynamics His cMRI also showed mostly nonviable LAD territory. He underwent RHC which showed moderately elevated L > R filing pressures and low-normal cardiac output and was being considered for PCI however he decompensated 10/20 with volume overload and signs of shock after beta blocker was increased for sinus tachycardia. He was transferred to CICU and began to improve. His current hemodynamics suggest volume overload with preserved cardiac output.     His tachycardia remains concerning but has had > 48 hours of preserved CO on RHC off beta blocker and is diuresing well and tolerating afterload reduction so does not imminently need advanced therapies but remains a higher risk patient. Will try to optimize and can consider PCI of RCA/LCx if he improves.     HEMODYNAMICS  10/21: RA 13, PA 50/33, PCWP 33, PA sat 66% with Cris CO/CI 4.4/2.4, BP 96/59 (70) with SVR 1036  10/22: pending for when patient returns to bed     REVIEW OF STUDIES  EKG: sinus tachycardia, septal q-waves, left axis deviation   TTE 10/16/23: LV 5.5 cm, LVEF 20% with regional WMAs worse in the apex, LVOT VTI 8 cm, normal RV size/function, severe functional MR, small pericardial effusion, estimated RA pressure 8 mmHg   TTE 12/2022: normal LVEF   LHC: 95% discrete proximal LAD disease and sequential multifocal disease with good distal target, 80-90% proximal LCx disease just prior to marginals, severe multifocal RCA disease with good targets   cMRI: LVEF 13%, greater than 50% LGE in mid LAD territory     PLAN  # Cardiogenic shock  - Improving, now off inotropes  - OK to remove PAC but please document full hemodynamics in chart note when he returns to bed     # Acute systolic heart failure  - Etiology: ischemic  - GDMT: stopped BB given initial signs of low output, increase hydralizine to 37.5 mg TID. deferring RAASi/MRA/SGLT2i given fluctuating renal function   - Diuretics: continue diuresis but decrease drip to 1 mg/hr  - Device: has ICD in place and notably with high burden of RV pacing, pending ultimate decision may need to consider CRT upgrade or epicardial LV lead   - Advanced therapies: severe LV dysfunction with tachycardia and limited viability concerning however has had multiple days of preserved cardiac output on RHC. he also may benefit from PCI of LCx/RCA. remains with high risk features and may need VAD/transplant evaluation in the future. ABO AB but appears to have deficits from his CVA.     # Severe functional MR  - needs reassessment after revascularization/GDMT    # Severe 3v CAD  - On ASA/statin, cMRI with limited LAD viability.  - Deemed too high risk for CABG  - Possible PCI pending clinical progress.      # LAURA  - current hemodynamics support preserved cardiac output and cardiorenal syndrome from congestion  - continue to monitor with diuresis

## 2023-10-22 NOTE — PROGRESS NOTE ADULT - PROBLEM SELECTOR PLAN 1
- Etiology: ischemic  - GDMT: discontinued Torpol XL 10/20 and deferring RAASi/MRA/SGLT2i given concerns of cardiogenic shock   - Diuretics: Bumex gtt to keep net negative 2L over 24hrs  - Device: has ICD in place and notably with high burden of RV pacing  - Advanced therapies: severe LV dysfunction with tachycardia and poor non-invasive hemodynamics concerning, currently holding off on launching VAD/transplant eval however may need to reassess. Of note he has good support and no clear psychosocial red flags. ABO AB. would need neurologic assessment with prior CVA and ideally improved conditioning given ongoing prolonged hospitalization. - Etiology: ischemic  - GDMT: discontinued Torpol XL 10/20 and deferring RAASi/MRA/SGLT2i given concerns of cardiogenic shock.   - Increase Hydralazine to 37.5mg q8h  - Diuretics: decrease Bumex gtt to 1mg/hr to keep net negative 2L over 24hrs  - Device: has ICD in place and notably with high burden of RV pacing  - Please check full set of PA catheter numbers along with mVO2 prior to removing swan today. And please document the numbers in a chart note  - Advanced therapies: severe LV dysfunction with tachycardia and poor non-invasive hemodynamics concerning, currently holding off on launching VAD/transplant eval however may need to reassess. Of note he has good support and no clear psychosocial red flags. ABO AB. would need neurologic assessment with prior CVA and ideally improved conditioning given ongoing prolonged hospitalization.

## 2023-10-22 NOTE — PROGRESS NOTE ADULT - SUBJECTIVE AND OBJECTIVE BOX
Subjective:  No acute events.     Medications:  acetaminophen     Tablet .. 650 milliGRAM(s) Oral every 6 hours PRN  albuterol    90 MICROgram(s) HFA Inhaler 2 Puff(s) Inhalation every 6 hours PRN  aspirin enteric coated 81 milliGRAM(s) Oral daily  atorvastatin 80 milliGRAM(s) Oral at bedtime  benzocaine/menthol Lozenge 1 Lozenge Oral every 2 hours PRN  buMETAnide Infusion 2 mG/Hr IV Continuous <Continuous>  dextrose 5%. 1000 milliLiter(s) IV Continuous <Continuous>  dextrose 5%. 1000 milliLiter(s) IV Continuous <Continuous>  dextrose 50% Injectable 25 Gram(s) IV Push once  dextrose 50% Injectable 12.5 Gram(s) IV Push once  dextrose 50% Injectable 25 Gram(s) IV Push once  dextrose Oral Gel 15 Gram(s) Oral once PRN  glucagon  Injectable 1 milliGRAM(s) IntraMuscular once  guaiFENesin Oral Liquid (Sugar-Free) 100 milliGRAM(s) Oral every 6 hours PRN  heparin   Injectable 5000 Unit(s) SubCutaneous every 8 hours  hydrALAZINE 25 milliGRAM(s) Oral every 8 hours  insulin glargine Injectable (LANTUS) 30 Unit(s) SubCutaneous at bedtime  insulin lispro (ADMELOG) corrective regimen sliding scale   SubCutaneous at bedtime  insulin lispro (ADMELOG) corrective regimen sliding scale   SubCutaneous three times a day before meals  insulin lispro Injectable (ADMELOG) 10 Unit(s) SubCutaneous three times a day before meals  pantoprazole    Tablet 40 milliGRAM(s) Oral before breakfast  polyethylene glycol 3350 17 Gram(s) Oral daily PRN  senna 2 Tablet(s) Oral at bedtime  tamsulosin 0.4 milliGRAM(s) Oral at bedtime    Vitals:  T(C): 37 (10-22-23 @ 03:00), Max: 37.7 (10-21-23 @ 16:00)  HR: 128 (10-22-23 @ 08:00) (120 - 141)  BP: 114/81 (10-22-23 @ 08:00) (97/62 - 120/77)  BP(mean): 93 (10-22-23 @ 08:00) (74 - 93)  ABP: 110/63 (10-22-23 @ 01:00) (86/51 - 127/72)  ABP(mean): 78 (10-22-23 @ 01:00) (60 - 88)  RR: 18 (10-22-23 @ 08:00) (13 - 36)  SpO2: 94% (10-22-23 @ 08:00) (91% - 98%)  CO: 6.1 (10-22-23 @ 00:30) (6.1 - 6.1)  CI: 3.2 (10-22-23 @ 00:30) (3.2 - 3.2)  PA: 39/19 (10-22-23 @ 08:00) (16/3 - 46/24)  PA(mean): 29 (10-22-23 @ 08:00) (7 - 35)  SVR: 931 (10-22-23 @ 00:30) (931 - 931)      I&O's Summary    21 Oct 2023 07:01  -  22 Oct 2023 07:00  --------------------------------------------------------  IN: 1460 mL / OUT: 4336 mL / NET: -2876 mL    22 Oct 2023 07:01  -  22 Oct 2023 08:30  --------------------------------------------------------  IN: 10 mL / OUT: 220 mL / NET: -210 mL        Physical Exam:  Appearance: No Acute Distress  Neck: elevated JVD  Chest: CTAB  CV: Normal S1 and S2.  Abdomen: Soft, non-distended, non-tender  Extremities: cool to touch, 1+ edema   Neurology: Alert and oriented times three.         Labs:                        11.1   12.19 )-----------( 200      ( 22 Oct 2023 00:47 )             34.2     10-22    139  |  104  |  48<H>  ----------------------------<  92  3.7   |  19<L>  |  1.97<H>    Ca    8.8      22 Oct 2023 00:46  Phos  5.1     10-22  Mg     2.0     10-22    TPro  6.2  /  Alb  3.2<L>  /  TBili  0.4  /  DBili  x   /  AST  30  /  ALT  36  /  AlkPhos  69  10-22    Oxygen Saturation, Mixed: 71.1 (10-22 @ 00:33)  Oxygen Saturation, Mixed: 64.7 (10-21 @ 15:35)  Oxygen Saturation, Mixed: 68.2 (10-21 @ 09:30)  Oxygen Saturation, Mixed: 65.6 (10-21 @ 03:10)     Subjective:  No acute events. Sitting up in chair. Feels better.     Medications:  acetaminophen     Tablet .. 650 milliGRAM(s) Oral every 6 hours PRN  albuterol    90 MICROgram(s) HFA Inhaler 2 Puff(s) Inhalation every 6 hours PRN  aspirin enteric coated 81 milliGRAM(s) Oral daily  atorvastatin 80 milliGRAM(s) Oral at bedtime  benzocaine/menthol Lozenge 1 Lozenge Oral every 2 hours PRN  buMETAnide Infusion 2 mG/Hr IV Continuous <Continuous>  dextrose 5%. 1000 milliLiter(s) IV Continuous <Continuous>  dextrose 5%. 1000 milliLiter(s) IV Continuous <Continuous>  dextrose 50% Injectable 25 Gram(s) IV Push once  dextrose 50% Injectable 12.5 Gram(s) IV Push once  dextrose 50% Injectable 25 Gram(s) IV Push once  dextrose Oral Gel 15 Gram(s) Oral once PRN  glucagon  Injectable 1 milliGRAM(s) IntraMuscular once  guaiFENesin Oral Liquid (Sugar-Free) 100 milliGRAM(s) Oral every 6 hours PRN  heparin   Injectable 5000 Unit(s) SubCutaneous every 8 hours  hydrALAZINE 25 milliGRAM(s) Oral every 8 hours  insulin glargine Injectable (LANTUS) 30 Unit(s) SubCutaneous at bedtime  insulin lispro (ADMELOG) corrective regimen sliding scale   SubCutaneous at bedtime  insulin lispro (ADMELOG) corrective regimen sliding scale   SubCutaneous three times a day before meals  insulin lispro Injectable (ADMELOG) 10 Unit(s) SubCutaneous three times a day before meals  pantoprazole    Tablet 40 milliGRAM(s) Oral before breakfast  polyethylene glycol 3350 17 Gram(s) Oral daily PRN  senna 2 Tablet(s) Oral at bedtime  tamsulosin 0.4 milliGRAM(s) Oral at bedtime    Vitals:  T(C): 37 (10-22-23 @ 03:00), Max: 37.7 (10-21-23 @ 16:00)  HR: 128 (10-22-23 @ 08:00) (120 - 141)  BP: 114/81 (10-22-23 @ 08:00) (97/62 - 120/77)  BP(mean): 93 (10-22-23 @ 08:00) (74 - 93)  ABP: 110/63 (10-22-23 @ 01:00) (86/51 - 127/72)  ABP(mean): 78 (10-22-23 @ 01:00) (60 - 88)  RR: 18 (10-22-23 @ 08:00) (13 - 36)  SpO2: 94% (10-22-23 @ 08:00) (91% - 98%)  CO: 6.1 (10-22-23 @ 00:30) (6.1 - 6.1)  CI: 3.2 (10-22-23 @ 00:30) (3.2 - 3.2)  PA: 39/19 (10-22-23 @ 08:00) (16/3 - 46/24)  PA(mean): 29 (10-22-23 @ 08:00) (7 - 35)  SVR: 931 (10-22-23 @ 00:30) (931 - 931)      I&O's Summary    21 Oct 2023 07:01  -  22 Oct 2023 07:00  --------------------------------------------------------  IN: 1460 mL / OUT: 4336 mL / NET: -2876 mL    22 Oct 2023 07:01  -  22 Oct 2023 08:30  --------------------------------------------------------  IN: 10 mL / OUT: 220 mL / NET: -210 mL        Physical Exam:  Appearance: No Acute Distress  Neck: elevated JVD  Chest: CTAB  CV: Normal S1 and S2.  Abdomen: Soft, non-distended, non-tender  Extremities: cool to touch, 1+ edema   Neurology: Alert and oriented times three.         Labs:                        11.1   12.19 )-----------( 200      ( 22 Oct 2023 00:47 )             34.2     10-22    139  |  104  |  48<H>  ----------------------------<  92  3.7   |  19<L>  |  1.97<H>    Ca    8.8      22 Oct 2023 00:46  Phos  5.1     10-22  Mg     2.0     10-22    TPro  6.2  /  Alb  3.2<L>  /  TBili  0.4  /  DBili  x   /  AST  30  /  ALT  36  /  AlkPhos  69  10-22    Oxygen Saturation, Mixed: 71.1 (10-22 @ 00:33)  Oxygen Saturation, Mixed: 64.7 (10-21 @ 15:35)  Oxygen Saturation, Mixed: 68.2 (10-21 @ 09:30)  Oxygen Saturation, Mixed: 65.6 (10-21 @ 03:10)

## 2023-10-22 NOTE — PROGRESS NOTE ADULT - ASSESSMENT
59 YO M with a history of CVA with residual mild R paresthesias, second degree Mobitz II heart block s/p DC-ICD 12/2022 (initial concern for sarcoid but negative biopsy/PET post procedure), DM2 (A1c 8.4%), and HTN who was admitted to [facility] with chest pain and dyspnea, found to have NSTEMI with markedly elevated troponins and new severe LV dysfunction with regional wall motion abnormalities as well as + enterovirus. He required NIPPV and was diuresed before being transferred to [facility] where LHC performed which revealed severe 3v CAD with critical proximal LAD involvement. CTS was consulted for CABG evaluation and HF consulted for comanagement.    He ultimately underwent RHC with revealed elevated wedge but normal cardiac output. Within the past 24 hours patient has become more tachycardic, is cool on exam and noted to have elevated JVP. He was transferred to CICU for swan placement and closer observation of hemodynamics. At this time patient would be consider poor candidate for CABG however would  benefit from high risk PCI with the potential need for advance therapies      REVIEW OF STUDIES  RHC 10/19: RA 10, RV 47/9/13, Wedge 29, PA 41/26/33, CO/CI 4.4/2.3, PA sat 57.3  EKG: sinus tachycardia, septal q-waves, left axis deviation   TTE 10/16/23: LV 5.5 cm, LVEF 20% with regional WMAs worse in the apex, LVOT VTI 8 cm, normal RV size/function, severe functional MR, small pericardial effusion, estimated RA pressure 8 mmHg   TTE 12/2022: normal LVEF   [facility]: 95% discrete proximal LAD disease and sequential multifocal disease with good distal target, 80-90% proximal LCx disease just prior to marginals, severe multifocal RCA disease with good targets     Hemodynamics:  10/21/23: RA 13 with V-wave 21, PA 50/33, PCW 33, MvO2 68.2, Cris CO/CI 4.4/2.56

## 2023-10-22 NOTE — CHART NOTE - NSCHARTNOTEFT_GEN_A_CORE
CCU Transfer Note    Transfer from: CCU  Transfer to:  (  ) Medicine    ( x ) Telemetry    (  ) RCU    (  ) Palliative    (  ) Stroke Unit    (  ) _______________  Accepting physician: Dr Monsivais       HPI: Patient with separate chart from [facility], MRN# [id number]    57 yo Male pmhx CVA with mild left sided deficits, HTN, DM2, vertigo, HLD, Mobitz II s/pp Medtronic dual chamber ICD (12/21/22) initially presented to [facility] from home with complaints of dyspnea and chest pain and was admitted w/ acute hypoxic respiratory failure likely due to acute pulmonary edema due to acute HFrEF in setting of acute NSTEMI, LAURA and enterovirus infection. Pt with brief MICU stay at [facility] requiring bipap (no intubation), was treated for PNA with cefepime, and was found to have TTE done 9/26/23 showing EF 20% with severely decreased LVSF, multiple regional wall motion abnormalities, and elevated LV end diastolic pressure. Pt was transferred to [facility] for cardiac cath evaluation. Patient without any acute complaints, complaining of intermittent palpitations for the last 2 days. No chest pain, SOB, fevers, nausea, vomiting, abdominal pain.    CCU Course: S/p LHC on 10/16 w/ 3v disease and RHC on 10/19 for hemodynamic assessment. Admitted to CICU for CABG eval vs. advanced therapy vs. high risk PCI. Briefly on empiric dobutamine. Deemed to be too high risk for CABG and has been stable with hydralazine for afterload reduction and diuresis with bumex. Heart Failure team following and currently holding off on advanced therapy discussion.     To-Do:  [ ] uptitrate GDMT as tolerated  [ ] f/u heart failure recs  [ ] trend Cr/LAURA and take into consideration when starting GDMT        MEDICATIONS:  STANDING MEDICATIONS  aspirin enteric coated 81 milliGRAM(s) Oral daily  atorvastatin 80 milliGRAM(s) Oral at bedtime  buMETAnide Infusion 1 mG/Hr IV Continuous <Continuous>  chlorhexidine 2% Cloths 1 Application(s) Topical daily  dextrose 5%. 1000 milliLiter(s) IV Continuous <Continuous>  dextrose 5%. 1000 milliLiter(s) IV Continuous <Continuous>  dextrose 50% Injectable 25 Gram(s) IV Push once  dextrose 50% Injectable 12.5 Gram(s) IV Push once  dextrose 50% Injectable 25 Gram(s) IV Push once  glucagon  Injectable 1 milliGRAM(s) IntraMuscular once  heparin   Injectable 5000 Unit(s) SubCutaneous every 8 hours  hydrALAZINE 37.5 milliGRAM(s) Oral every 8 hours  insulin glargine Injectable (LANTUS) 30 Unit(s) SubCutaneous at bedtime  insulin lispro (ADMELOG) corrective regimen sliding scale   SubCutaneous three times a day before meals  insulin lispro (ADMELOG) corrective regimen sliding scale   SubCutaneous at bedtime  insulin lispro Injectable (ADMELOG) 10 Unit(s) SubCutaneous three times a day before meals  pantoprazole    Tablet 40 milliGRAM(s) Oral before breakfast  senna 2 Tablet(s) Oral at bedtime  tamsulosin 0.4 milliGRAM(s) Oral at bedtime    PRN MEDICATIONS  acetaminophen     Tablet .. 650 milliGRAM(s) Oral every 6 hours PRN  albuterol    90 MICROgram(s) HFA Inhaler 2 Puff(s) Inhalation every 6 hours PRN  benzocaine/menthol Lozenge 1 Lozenge Oral every 2 hours PRN  dextrose Oral Gel 15 Gram(s) Oral once PRN  guaiFENesin Oral Liquid (Sugar-Free) 100 milliGRAM(s) Oral every 6 hours PRN  polyethylene glycol 3350 17 Gram(s) Oral daily PRN      VITAL SIGNS: Last 24 Hours  T(C): 37 (22 Oct 2023 08:00), Max: 37.5 (21 Oct 2023 19:00)  T(F): 98.6 (22 Oct 2023 08:00), Max: 99.5 (21 Oct 2023 19:00)  HR: 128 (22 Oct 2023 17:00) (120 - 141)  BP: 109/74 (22 Oct 2023 17:00) (97/62 - 123/81)  BP(mean): 87 (22 Oct 2023 17:00) (74 - 98)  RR: 24 (22 Oct 2023 17:00) (13 - 36)  SpO2: 96% (22 Oct 2023 17:00) (91% - 97%)    LABS:                        11.1   12.19 )-----------( 200      ( 22 Oct 2023 00:47 )             34.2     10-22    139  |  103  |  50<H>  ----------------------------<  209<H>  4.0   |  22  |  1.90<H>    Ca    8.7      22 Oct 2023 12:28  Phos  5.1     10-22  Mg     2.0     10-22    TPro  6.4  /  Alb  3.2<L>  /  TBili  0.3  /  DBili  x   /  AST  24  /  ALT  36  /  AlkPhos  69  10-22    PT/INR - ( 22 Oct 2023 00:47 )   PT: 13.5 sec;   INR: 1.23 ratio         PTT - ( 22 Oct 2023 00:47 )  PTT:32.0 sec  Urinalysis Basic - ( 22 Oct 2023 12:28 )    Color: x / Appearance: x / SG: x / pH: x  Gluc: 209 mg/dL / Ketone: x  / Bili: x / Urobili: x   Blood: x / Protein: x / Nitrite: x   Leuk Esterase: x / RBC: x / WBC x   Sq Epi: x / Non Sq Epi: x / Bacteria: x      ABG - ( 22 Oct 2023 00:33 )  pH, Arterial: 7.44  pH, Blood: x     /  pCO2: 31    /  pO2: 89    / HCO3: 21    / Base Excess: -2.3  /  SaO2: 96.8 no

## 2023-10-22 NOTE — PROGRESS NOTE ADULT - ASSESSMENT
57 yo Male pmhx CVA with mild left sided deficits, HTN, DM2, vertigo, HLD, Mobitz II s/pp Medtronic dual chamber ICD (12/21/22) initially presented to OSH for SOB c/f ADHF vs. PNA, later found to have NSTEMI transferred to [facility] for LHC evaluation. s/p LHC on 10/16 w/ 3v disease and RHC on 10/19 for hemodynamic assessment. Admitted to CICU for CABG eval vs. advanced therapy vs. high risk PCI    ======NEURO======  #Mental status  - Baseline AAOx3, currently at baseline    #Hx of CVA w/ mild L residual deficit  - Neurological assessment per protocol     ======CV======  #CAD  #HTN  #HFrEF  Likely iso ischemic insult  ECHO 10/16: EF 20%, severely reduced LVSF  C 10/16: 95% pLAD, 80% mid and distal LCx, 90% mid and distal RCA  C 10/19: RA 10, PCWP 20, CO 4.4, CI 2.3  CMR 10/18: limited viability in LAD territory  s/p [place] (10/21 - )    - f/u HF, CTS recs regarding Advance therapy vs. CABG vs. high risk PCI  - c/w ASA 81, Atorv 80mg  - c/w Bumex drip  - s/p Dobutamine (10/20 overnight)  - c/w Hydralazine 25 q8h for AF reduction  - avoid BB/CCB given borderline cardiac function  - Start GDMT when appropriate    #Tachycardia  #Mobitz II s/p AICD  - Likely reflex tachycardiac iso hypoxia +/- NSTEMI +/- ADHF  - Continue to monitor    #HLD  - c/w Atorv 80mg     ======PULM======  #Pulmonary edema  - Oxygen therapy PRN keep O2%>92%  - c/w Bumex drip  - Albuterol PRN    ======RENAL======  #LAURA:   sCr on admission 1.28, uptrending likely iso diuresis, NSTEMI and HFrEF  - Monitor I/O  - Trend BUN and creatinine  - Renally dose meds, avoid nephrotoxins  - Goal negative 2L over 24hrs    #Hyponatremia  - mild hypoNa to 133 noted on 10/20, asymptomatic  - likely iso CHF +/- LAURA  - Continue to trend    #BPH  - c/w Flomax 0.4mg qhs    ======GI======  - No active issues  - CC DASH Halal diet  - GI ppx: PPI 40mg daily  - c/w Bowel regimen    ======ENDO======  #DM2  - A1c 10/14 8.4%  - c/w Lantus 30u qhs + lispro 10u premeal + ISS  - FS    ======HEMATOLOGIC======  - No active issues  - DVT ppx: HSQ iso LAURA    ======ID======  - No active issues  - Recent admission to OSH for possible PNA s/p 14 days of cefepime (last dose 10/14)  - Currently no signs of infection, WBC 10.6, afebrile, no indications for abx    Lines:  Rt IJ PA Cath 10/21  RT IJ double lumen cordis 10/21  Lt radial a line 10/20   59 yo Male pmhx CVA with mild left sided deficits, HTN, DM2, vertigo, HLD, Mobitz II s/pp Medtronic dual chamber ICD (12/21/22) initially presented to OSH for SOB c/f ADHF vs. PNA, later found to have NSTEMI transferred to [facility] for LHC evaluation. s/p LHC on 10/16 w/ 3v disease and RHC on 10/19 for hemodynamic assessment. Admitted to CICU for CABG eval vs. advanced therapy vs. high risk PCI    ======NEURO======  #Mental status  - Baseline AAOx3, currently at baseline    #Hx of CVA w/ mild L residual deficit  - Neurological assessment per protocol     ======CV======  #CAD  #HTN  #HFrEF  Likely iso ischemic insult  ECHO 10/16: EF 20%, severely reduced LVSF  [facility] 10/16: 95% pLAD, 80% mid and distal LCx, 90% mid and distal RCA  C 10/19: RA 10, PCWP 20, CO 4.4, CI 2.3  CMR 10/18: limited viability in LAD territory  s/p [place] (10/21 - 10/22)    - f/u HF, CTS recs regarding Advance therapy vs. CABG vs. high risk PCI  - c/w ASA 81, Atorv 80mg  - dec Bumex drip to 1mg/hr  - s/p Dobutamine (10/20 overnight)  - inc Hydralazine to 37.5 q8h for afterload reduction  - avoid BB/CCB given borderline cardiac function  - Start GDMT when appropriate    #Tachycardia  #Mobitz II s/p AICD  - Likely reflex tachycardiac iso hypoxia +/- NSTEMI +/- ADHF  - Continue to monitor    #HLD  - c/w Atorv 80mg     ======PULM======  #Pulmonary edema  - Oxygen therapy PRN keep O2%>92%  - c/w Bumex drip  - Albuterol PRN    ======RENAL======  #LAURA:   sCr on admission 1.28, uptrending likely iso diuresis, NSTEMI and HFrEF  - Monitor I/O  - Trend BUN and creatinine  - Renally dose meds, avoid nephrotoxins  - Goal net negative    #Hyponatremia  - mild hypoNa , asymptomatic  - likely iso CHF +/- LAURA  - Continue to trend    #BPH  - c/w Flomax 0.4mg qhs    ======GI======  - No active issues  - CC DASH Halal diet  - GI ppx: PPI 40mg daily  - c/w Bowel regimen    ======ENDO======  #DM2  - A1c 10/14 8.4%  - c/w Lantus 30u qhs + lispro 10u premeal + ISS  - FS    ======HEMATOLOGIC======  - No active issues  - DVT ppx: HSQ iso LAURA    ======ID======  - No active issues  - Recent admission to OSH for possible PNA s/p 14 days of cefepime (last dose 10/14)  - Currently no signs of infection, WBC 10.6, afebrile, no indications for abx

## 2023-10-22 NOTE — PROGRESS NOTE ADULT - ATTENDING COMMENTS
57yo M with CVA DM2 s/p ICD here with shortness of breath, admitted for ADHF vs PNA and LHC on 10/16 showed 3v dz and reduced EF. RHC on 10/19 showed CVP of 10, PCWP 29 and CI of 2.3, started empirically on  for shortness of breath but repeat RHC with almost identical numbers now. Will continue diuresis, remove swan     Neuro: no active issues  Pulm: volume overload  CV: stop dobutamine and recheck numbers, appear similiar to day prior.   Renal: not responding to diuresis despite volume overload, will start bumex gtt  GI: DASH diet  ID: no abx  Heme: sqh ppx  Endo: BG controlled  IJ swan (10/20) to be removed today

## 2023-10-22 NOTE — PROGRESS NOTE ADULT - SUBJECTIVE AND OBJECTIVE BOX
Patient is a 58y old  Male who presents with a chief complaint of NSTEMI (21 Oct 2023 19:06)    HPI:  Patient with separate chart from [facility], MRN# [id number]    59 yo Male pmhx CVA with mild left sided deficits, HTN, DM2, vertigo, HLD, Mobitz II s/pp Medtronic dual chamber ICD (22) initially presented to [facility] from home with complaints of dyspnea and chest pain and was admitted w/ acute hypoxic respiratory failure likely due to acute pulmonary edema due to acute HFrEF in setting of acute NSTEMI, LAURA and enterovirus infection. Pt with brief MICU stay at [facility] requiring bipap (no intubation), was treated for PNA with cefepime, and was found to have TTE done 23 showing EF 20% with severely decreased LVSF, multiple regional wall motion abnormalities, and elevated LV end diastolic pressure. Pt was transferred to [facility] for cardiac cath evaluation. Patient without any acute complaints, complaining of intermittent palpitations for the last 2 days. No chest pain, SOB, fevers, nausea, vomiting, abdominal pain.  (13 Oct 2023 21:05)       INTERVAL HPI/OVERNIGHT EVENTS:   No overnight events   Afebrile, hemodynamically stable     Subjective:    ICU Vital Signs Last 24 Hrs  T(C): 37 (22 Oct 2023 03:00), Max: 37.7 (21 Oct 2023 16:00)  T(F): 98.6 (22 Oct 2023 03:00), Max: 99.9 (21 Oct 2023 16:00)  HR: 125 (22 Oct 2023 06:00) (120 - 141)  BP: 114/80 (22 Oct 2023 06:00) (104/66 - 120/77)  BP(mean): 92 (22 Oct 2023 06:00) (87 - 92)  ABP: 110/63 (22 Oct 2023 01:00) (86/51 - 127/72)  ABP(mean): 78 (22 Oct 2023 01:00) (60 - 88)  RR: 19 (22 Oct 2023 06:00) (13 - 36)  SpO2: 95% (22 Oct 2023 06:00) (91% - 98%)    O2 Parameters below as of 22 Oct 2023 06:00  Patient On (Oxygen Delivery Method): nasal cannula  O2 Flow (L/min): 2        I&O's Summary    21 Oct 2023 07:01  -  22 Oct 2023 07:00  --------------------------------------------------------  IN: 1460 mL / OUT: 4336 mL / NET: -2876 mL          Daily Height in cm: 185.42 (21 Oct 2023 12:00)    Daily Weight in k.3 (22 Oct 2023 01:00)    Adult Advanced Hemodynamics Last 24 Hrs  CVP(mm Hg): 3 (22 Oct 2023 06:00) (-1 - 13)  CVP(cm H2O): --  CO: 6.1 (22 Oct 2023 00:30) (6.1 - 6.1)  CI: 3.2 (22 Oct 2023 00:30) (3.2 - 3.2)  PA: 40/21 (22 Oct 2023 06:00) (16/3 - 46/24)  PA(mean): 30 (22 Oct 2023 06:00) (7 - 35)  PCWP: --  SVR: 931 (22 Oct 2023 00:30) (931 - 931)  SVRI: --  PVR: --  PVRI: --    EKG/Telemetry Events:    MEDICATIONS  (STANDING):  aspirin enteric coated 81 milliGRAM(s) Oral daily  atorvastatin 80 milliGRAM(s) Oral at bedtime  buMETAnide Infusion 2 mG/Hr (10 mL/Hr) IV Continuous <Continuous>  dextrose 5%. 1000 milliLiter(s) (50 mL/Hr) IV Continuous <Continuous>  dextrose 5%. 1000 milliLiter(s) (100 mL/Hr) IV Continuous <Continuous>  dextrose 50% Injectable 25 Gram(s) IV Push once  dextrose 50% Injectable 12.5 Gram(s) IV Push once  dextrose 50% Injectable 25 Gram(s) IV Push once  glucagon  Injectable 1 milliGRAM(s) IntraMuscular once  heparin   Injectable 5000 Unit(s) SubCutaneous every 8 hours  hydrALAZINE 25 milliGRAM(s) Oral every 8 hours  insulin glargine Injectable (LANTUS) 30 Unit(s) SubCutaneous at bedtime  insulin lispro (ADMELOG) corrective regimen sliding scale   SubCutaneous three times a day before meals  insulin lispro (ADMELOG) corrective regimen sliding scale   SubCutaneous at bedtime  insulin lispro Injectable (ADMELOG) 10 Unit(s) SubCutaneous three times a day before meals  pantoprazole    Tablet 40 milliGRAM(s) Oral before breakfast  senna 2 Tablet(s) Oral at bedtime  tamsulosin 0.4 milliGRAM(s) Oral at bedtime    MEDICATIONS  (PRN):  acetaminophen     Tablet .. 650 milliGRAM(s) Oral every 6 hours PRN Temp greater or equal to 38C (100.4F), Mild Pain (1 - 3)  albuterol    90 MICROgram(s) HFA Inhaler 2 Puff(s) Inhalation every 6 hours PRN Shortness of Breath and/or Wheezing  benzocaine/menthol Lozenge 1 Lozenge Oral every 2 hours PRN Sore Throat  dextrose Oral Gel 15 Gram(s) Oral once PRN Blood Glucose LESS THAN 70 milliGRAM(s)/deciliter  guaiFENesin Oral Liquid (Sugar-Free) 100 milliGRAM(s) Oral every 6 hours PRN Cough  polyethylene glycol 3350 17 Gram(s) Oral daily PRN Constipation      PHYSICAL EXAM:  GENERAL:   HEAD:  Atraumatic, Normocephalic  EYES: EOMI, PERRLA, conjunctiva and sclera clear  NECK: Supple, No JVD, Normal thyroid, no enlarged nodes  NERVOUS SYSTEM:  Alert & Awake.   CHEST/LUNG: B/L good air entry; No rales, rhonchi, or wheezing  HEART: S1S2 normal, no S3, Regular rate and rhythm; No murmurs  ABDOMEN: Soft, Nontender, Nondistended; Bowel sounds present  EXTREMITIES:  2+ Peripheral Pulses, No clubbing, cyanosis, or edema  LYMPH: No lymphadenopathy noted  SKIN: No rashes or lesions    LABS:                        11.1   12.19 )-----------( 200      ( 22 Oct 2023 00:47 )             34.2     10-    139  |  104  |  48<H>  ----------------------------<  92  3.7   |  19<L>  |  1.97<H>    Ca    8.8      22 Oct 2023 00:46  Phos  5.1     10-  Mg     2.0     10-22    TPro  6.2  /  Alb  3.2<L>  /  TBili  0.4  /  DBili  x   /  AST  30  /  ALT  36  /  AlkPhos  69  10-22    LIVER FUNCTIONS - ( 22 Oct 2023 00:46 )  Alb: 3.2 g/dL / Pro: 6.2 g/dL / ALK PHOS: 69 U/L / ALT: 36 U/L / AST: 30 U/L / GGT: x           PT/INR - ( 22 Oct 2023 00:47 )   PT: 13.5 sec;   INR: 1.23 ratio         PTT - ( 22 Oct 2023 00:47 )  PTT:32.0 sec  CAPILLARY BLOOD GLUCOSE      POCT Blood Glucose.: 101 mg/dL (21 Oct 2023 21:48)  POCT Blood Glucose.: 122 mg/dL (21 Oct 2023 16:44)  POCT Blood Glucose.: 154 mg/dL (21 Oct 2023 11:44)  POCT Blood Glucose.: 120 mg/dL (21 Oct 2023 08:33)    ABG - ( 22 Oct 2023 00:33 )  pH, Arterial: 7.44  pH, Blood: x     /  pCO2: 31    /  pO2: 89    / HCO3: 21    / Base Excess: -2.3  /  SaO2: 96.8                    Urinalysis Basic - ( 22 Oct 2023 00:46 )    Color: x / Appearance: x / SG: x / pH: x  Gluc: 92 mg/dL / Ketone: x  / Bili: x / Urobili: x   Blood: x / Protein: x / Nitrite: x   Leuk Esterase: x / RBC: x / WBC x   Sq Epi: x / Non Sq Epi: x / Bacteria: x          RADIOLOGY & ADDITIONAL TESTS:  CXR:        Care Discussed with Consultants/Other Providers [ x] YES  [ ] NO

## 2023-10-23 LAB
ALBUMIN SERPL ELPH-MCNC: 3.6 G/DL — SIGNIFICANT CHANGE UP (ref 3.3–5)
ALBUMIN SERPL ELPH-MCNC: 3.6 G/DL — SIGNIFICANT CHANGE UP (ref 3.3–5)
ALP SERPL-CCNC: 72 U/L — SIGNIFICANT CHANGE UP (ref 40–120)
ALP SERPL-CCNC: 72 U/L — SIGNIFICANT CHANGE UP (ref 40–120)
ALT FLD-CCNC: 36 U/L — SIGNIFICANT CHANGE UP (ref 10–45)
ALT FLD-CCNC: 36 U/L — SIGNIFICANT CHANGE UP (ref 10–45)
ANION GAP SERPL CALC-SCNC: 15 MMOL/L — SIGNIFICANT CHANGE UP (ref 5–17)
ANION GAP SERPL CALC-SCNC: 15 MMOL/L — SIGNIFICANT CHANGE UP (ref 5–17)
AST SERPL-CCNC: 21 U/L — SIGNIFICANT CHANGE UP (ref 10–40)
AST SERPL-CCNC: 21 U/L — SIGNIFICANT CHANGE UP (ref 10–40)
BASOPHILS # BLD AUTO: 0.04 K/UL — SIGNIFICANT CHANGE UP (ref 0–0.2)
BASOPHILS # BLD AUTO: 0.04 K/UL — SIGNIFICANT CHANGE UP (ref 0–0.2)
BASOPHILS NFR BLD AUTO: 0.4 % — SIGNIFICANT CHANGE UP (ref 0–2)
BASOPHILS NFR BLD AUTO: 0.4 % — SIGNIFICANT CHANGE UP (ref 0–2)
BILIRUB SERPL-MCNC: 0.5 MG/DL — SIGNIFICANT CHANGE UP (ref 0.2–1.2)
BILIRUB SERPL-MCNC: 0.5 MG/DL — SIGNIFICANT CHANGE UP (ref 0.2–1.2)
BUN SERPL-MCNC: 45 MG/DL — HIGH (ref 7–23)
BUN SERPL-MCNC: 45 MG/DL — HIGH (ref 7–23)
CALCIUM SERPL-MCNC: 9.3 MG/DL — SIGNIFICANT CHANGE UP (ref 8.4–10.5)
CALCIUM SERPL-MCNC: 9.3 MG/DL — SIGNIFICANT CHANGE UP (ref 8.4–10.5)
CHLORIDE SERPL-SCNC: 103 MMOL/L — SIGNIFICANT CHANGE UP (ref 96–108)
CHLORIDE SERPL-SCNC: 103 MMOL/L — SIGNIFICANT CHANGE UP (ref 96–108)
CO2 SERPL-SCNC: 24 MMOL/L — SIGNIFICANT CHANGE UP (ref 22–31)
CO2 SERPL-SCNC: 24 MMOL/L — SIGNIFICANT CHANGE UP (ref 22–31)
CREAT SERPL-MCNC: 1.78 MG/DL — HIGH (ref 0.5–1.3)
CREAT SERPL-MCNC: 1.78 MG/DL — HIGH (ref 0.5–1.3)
EGFR: 44 ML/MIN/1.73M2 — LOW
EGFR: 44 ML/MIN/1.73M2 — LOW
EOSINOPHIL # BLD AUTO: 0.02 K/UL — SIGNIFICANT CHANGE UP (ref 0–0.5)
EOSINOPHIL # BLD AUTO: 0.02 K/UL — SIGNIFICANT CHANGE UP (ref 0–0.5)
EOSINOPHIL NFR BLD AUTO: 0.2 % — SIGNIFICANT CHANGE UP (ref 0–6)
EOSINOPHIL NFR BLD AUTO: 0.2 % — SIGNIFICANT CHANGE UP (ref 0–6)
GLUCOSE BLDC GLUCOMTR-MCNC: 133 MG/DL — HIGH (ref 70–99)
GLUCOSE BLDC GLUCOMTR-MCNC: 133 MG/DL — HIGH (ref 70–99)
GLUCOSE BLDC GLUCOMTR-MCNC: 147 MG/DL — HIGH (ref 70–99)
GLUCOSE BLDC GLUCOMTR-MCNC: 147 MG/DL — HIGH (ref 70–99)
GLUCOSE BLDC GLUCOMTR-MCNC: 149 MG/DL — HIGH (ref 70–99)
GLUCOSE BLDC GLUCOMTR-MCNC: 149 MG/DL — HIGH (ref 70–99)
GLUCOSE BLDC GLUCOMTR-MCNC: 74 MG/DL — SIGNIFICANT CHANGE UP (ref 70–99)
GLUCOSE BLDC GLUCOMTR-MCNC: 74 MG/DL — SIGNIFICANT CHANGE UP (ref 70–99)
GLUCOSE BLDC GLUCOMTR-MCNC: 82 MG/DL — SIGNIFICANT CHANGE UP (ref 70–99)
GLUCOSE BLDC GLUCOMTR-MCNC: 82 MG/DL — SIGNIFICANT CHANGE UP (ref 70–99)
GLUCOSE SERPL-MCNC: 75 MG/DL — SIGNIFICANT CHANGE UP (ref 70–99)
GLUCOSE SERPL-MCNC: 75 MG/DL — SIGNIFICANT CHANGE UP (ref 70–99)
HCT VFR BLD CALC: 39.5 % — SIGNIFICANT CHANGE UP (ref 39–50)
HCT VFR BLD CALC: 39.5 % — SIGNIFICANT CHANGE UP (ref 39–50)
HGB BLD-MCNC: 12.5 G/DL — LOW (ref 13–17)
HGB BLD-MCNC: 12.5 G/DL — LOW (ref 13–17)
IMM GRANULOCYTES NFR BLD AUTO: 0.4 % — SIGNIFICANT CHANGE UP (ref 0–0.9)
IMM GRANULOCYTES NFR BLD AUTO: 0.4 % — SIGNIFICANT CHANGE UP (ref 0–0.9)
LYMPHOCYTES # BLD AUTO: 1.15 K/UL — SIGNIFICANT CHANGE UP (ref 1–3.3)
LYMPHOCYTES # BLD AUTO: 1.15 K/UL — SIGNIFICANT CHANGE UP (ref 1–3.3)
LYMPHOCYTES # BLD AUTO: 10.2 % — LOW (ref 13–44)
LYMPHOCYTES # BLD AUTO: 10.2 % — LOW (ref 13–44)
MCHC RBC-ENTMCNC: 25.6 PG — LOW (ref 27–34)
MCHC RBC-ENTMCNC: 25.6 PG — LOW (ref 27–34)
MCHC RBC-ENTMCNC: 31.6 GM/DL — LOW (ref 32–36)
MCHC RBC-ENTMCNC: 31.6 GM/DL — LOW (ref 32–36)
MCV RBC AUTO: 80.8 FL — SIGNIFICANT CHANGE UP (ref 80–100)
MCV RBC AUTO: 80.8 FL — SIGNIFICANT CHANGE UP (ref 80–100)
MONOCYTES # BLD AUTO: 1.08 K/UL — HIGH (ref 0–0.9)
MONOCYTES # BLD AUTO: 1.08 K/UL — HIGH (ref 0–0.9)
MONOCYTES NFR BLD AUTO: 9.5 % — SIGNIFICANT CHANGE UP (ref 2–14)
MONOCYTES NFR BLD AUTO: 9.5 % — SIGNIFICANT CHANGE UP (ref 2–14)
NEUTROPHILS # BLD AUTO: 8.99 K/UL — HIGH (ref 1.8–7.4)
NEUTROPHILS # BLD AUTO: 8.99 K/UL — HIGH (ref 1.8–7.4)
NEUTROPHILS NFR BLD AUTO: 79.3 % — HIGH (ref 43–77)
NEUTROPHILS NFR BLD AUTO: 79.3 % — HIGH (ref 43–77)
NRBC # BLD: 0 /100 WBCS — SIGNIFICANT CHANGE UP (ref 0–0)
NRBC # BLD: 0 /100 WBCS — SIGNIFICANT CHANGE UP (ref 0–0)
PLATELET # BLD AUTO: 186 K/UL — SIGNIFICANT CHANGE UP (ref 150–400)
PLATELET # BLD AUTO: 186 K/UL — SIGNIFICANT CHANGE UP (ref 150–400)
POTASSIUM SERPL-MCNC: 3.3 MMOL/L — LOW (ref 3.5–5.3)
POTASSIUM SERPL-MCNC: 3.3 MMOL/L — LOW (ref 3.5–5.3)
POTASSIUM SERPL-SCNC: 3.3 MMOL/L — LOW (ref 3.5–5.3)
POTASSIUM SERPL-SCNC: 3.3 MMOL/L — LOW (ref 3.5–5.3)
PROT SERPL-MCNC: 6.8 G/DL — SIGNIFICANT CHANGE UP (ref 6–8.3)
PROT SERPL-MCNC: 6.8 G/DL — SIGNIFICANT CHANGE UP (ref 6–8.3)
RBC # BLD: 4.89 M/UL — SIGNIFICANT CHANGE UP (ref 4.2–5.8)
RBC # BLD: 4.89 M/UL — SIGNIFICANT CHANGE UP (ref 4.2–5.8)
RBC # FLD: 15.9 % — HIGH (ref 10.3–14.5)
RBC # FLD: 15.9 % — HIGH (ref 10.3–14.5)
SODIUM SERPL-SCNC: 142 MMOL/L — SIGNIFICANT CHANGE UP (ref 135–145)
SODIUM SERPL-SCNC: 142 MMOL/L — SIGNIFICANT CHANGE UP (ref 135–145)
WBC # BLD: 11.33 K/UL — HIGH (ref 3.8–10.5)
WBC # BLD: 11.33 K/UL — HIGH (ref 3.8–10.5)
WBC # FLD AUTO: 11.33 K/UL — HIGH (ref 3.8–10.5)
WBC # FLD AUTO: 11.33 K/UL — HIGH (ref 3.8–10.5)

## 2023-10-23 PROCEDURE — 71045 X-RAY EXAM CHEST 1 VIEW: CPT | Mod: 26

## 2023-10-23 PROCEDURE — 99233 SBSQ HOSP IP/OBS HIGH 50: CPT

## 2023-10-23 PROCEDURE — 99232 SBSQ HOSP IP/OBS MODERATE 35: CPT

## 2023-10-23 RX ORDER — INSULIN GLARGINE 100 [IU]/ML
24 INJECTION, SOLUTION SUBCUTANEOUS AT BEDTIME
Refills: 0 | Status: DISCONTINUED | OUTPATIENT
Start: 2023-10-23 | End: 2023-10-29

## 2023-10-23 RX ORDER — INSULIN LISPRO 100/ML
8 VIAL (ML) SUBCUTANEOUS
Refills: 0 | Status: DISCONTINUED | OUTPATIENT
Start: 2023-10-23 | End: 2023-10-29

## 2023-10-23 RX ORDER — POTASSIUM CHLORIDE 20 MEQ
40 PACKET (EA) ORAL EVERY 4 HOURS
Refills: 0 | Status: COMPLETED | OUTPATIENT
Start: 2023-10-23 | End: 2023-10-23

## 2023-10-23 RX ORDER — ISOSORBIDE DINITRATE 5 MG/1
10 TABLET ORAL THREE TIMES A DAY
Refills: 0 | Status: DISCONTINUED | OUTPATIENT
Start: 2023-10-23 | End: 2023-10-26

## 2023-10-23 RX ADMIN — TAMSULOSIN HYDROCHLORIDE 0.4 MILLIGRAM(S): 0.4 CAPSULE ORAL at 21:46

## 2023-10-23 RX ADMIN — Medication 37.5 MILLIGRAM(S): at 05:22

## 2023-10-23 RX ADMIN — HEPARIN SODIUM 5000 UNIT(S): 5000 INJECTION INTRAVENOUS; SUBCUTANEOUS at 21:46

## 2023-10-23 RX ADMIN — SENNA PLUS 2 TABLET(S): 8.6 TABLET ORAL at 21:45

## 2023-10-23 RX ADMIN — Medication 40 MILLIEQUIVALENT(S): at 13:42

## 2023-10-23 RX ADMIN — Medication 8 UNIT(S): at 16:51

## 2023-10-23 RX ADMIN — Medication 81 MILLIGRAM(S): at 13:43

## 2023-10-23 RX ADMIN — ATORVASTATIN CALCIUM 80 MILLIGRAM(S): 80 TABLET, FILM COATED ORAL at 21:45

## 2023-10-23 RX ADMIN — HEPARIN SODIUM 5000 UNIT(S): 5000 INJECTION INTRAVENOUS; SUBCUTANEOUS at 13:43

## 2023-10-23 RX ADMIN — Medication 8 UNIT(S): at 12:05

## 2023-10-23 RX ADMIN — HEPARIN SODIUM 5000 UNIT(S): 5000 INJECTION INTRAVENOUS; SUBCUTANEOUS at 05:22

## 2023-10-23 RX ADMIN — BUMETANIDE 5 MG/HR: 0.25 INJECTION INTRAMUSCULAR; INTRAVENOUS at 07:00

## 2023-10-23 RX ADMIN — BUMETANIDE 5 MG/HR: 0.25 INJECTION INTRAMUSCULAR; INTRAVENOUS at 06:31

## 2023-10-23 RX ADMIN — PANTOPRAZOLE SODIUM 40 MILLIGRAM(S): 20 TABLET, DELAYED RELEASE ORAL at 05:22

## 2023-10-23 RX ADMIN — INSULIN GLARGINE 24 UNIT(S): 100 INJECTION, SOLUTION SUBCUTANEOUS at 22:38

## 2023-10-23 RX ADMIN — Medication 40 MILLIEQUIVALENT(S): at 12:06

## 2023-10-23 RX ADMIN — Medication 37.5 MILLIGRAM(S): at 13:42

## 2023-10-23 RX ADMIN — Medication 37.5 MILLIGRAM(S): at 21:46

## 2023-10-23 RX ADMIN — Medication 10 UNIT(S): at 07:59

## 2023-10-23 NOTE — PROGRESS NOTE ADULT - SUBJECTIVE AND OBJECTIVE BOX
Patient seen and examined at bedside.    Overnight Events: AMI, notes that he is feeling well denies FC NV CP SOB however still cannot lay flat in bed.     Review of Systems: Negative except as per HPI     Current Meds:  acetaminophen     Tablet .. 650 milliGRAM(s) Oral every 6 hours PRN  albuterol    90 MICROgram(s) HFA Inhaler 2 Puff(s) Inhalation every 6 hours PRN  aspirin enteric coated 81 milliGRAM(s) Oral daily  atorvastatin 80 milliGRAM(s) Oral at bedtime  benzocaine/menthol Lozenge 1 Lozenge Oral every 2 hours PRN  buMETAnide Infusion 1 mG/Hr IV Continuous <Continuous>  chlorhexidine 2% Cloths 1 Application(s) Topical daily  dextrose 5%. 1000 milliLiter(s) IV Continuous <Continuous>  dextrose 5%. 1000 milliLiter(s) IV Continuous <Continuous>  dextrose 50% Injectable 12.5 Gram(s) IV Push once  dextrose 50% Injectable 25 Gram(s) IV Push once  dextrose 50% Injectable 25 Gram(s) IV Push once  dextrose Oral Gel 15 Gram(s) Oral once PRN  glucagon  Injectable 1 milliGRAM(s) IntraMuscular once  guaiFENesin Oral Liquid (Sugar-Free) 100 milliGRAM(s) Oral every 6 hours PRN  heparin   Injectable 5000 Unit(s) SubCutaneous every 8 hours  hydrALAZINE 37.5 milliGRAM(s) Oral every 8 hours  insulin glargine Injectable (LANTUS) 24 Unit(s) SubCutaneous at bedtime  insulin lispro (ADMELOG) corrective regimen sliding scale   SubCutaneous three times a day before meals  insulin lispro (ADMELOG) corrective regimen sliding scale   SubCutaneous at bedtime  insulin lispro Injectable (ADMELOG) 8 Unit(s) SubCutaneous three times a day before meals  pantoprazole    Tablet 40 milliGRAM(s) Oral before breakfast  polyethylene glycol 3350 17 Gram(s) Oral daily PRN  senna 2 Tablet(s) Oral at bedtime  tamsulosin 0.4 milliGRAM(s) Oral at bedtime      Vitals:  T(F): 98.2 (10-23), Max: 99 (10-23)  HR: 126 (10-23) (126 - 132)  BP: 104/74 (10-23) (78/- - 121/88)  RR: 18 (10-23)  SpO2: 96% (10-23)  I&O's Summary    22 Oct 2023 07:01  -  23 Oct 2023 07:00  --------------------------------------------------------  IN: 1095 mL / OUT: 2905 mL / NET: -1810 mL    23 Oct 2023 07:01  -  23 Oct 2023 16:56  --------------------------------------------------------  IN: 50 mL / OUT: 800 mL / NET: -750 mL        Physical Exam:  Appearance: No acute distress; ill appearing   Eyes: PERRL, EOMI, pink conjunctiva  HEENT: Normal oral mucosa  Cardiovascular: RRR, S1, S2, no murmurs, rubs, or gallops; no edema; JVD to mid neck   Respiratory: Crackles BL   Gastrointestinal: soft, non-tender, non-distended with normal bowel sounds  Musculoskeletal: No clubbing; no joint deformity   Neurologic: Non-focal  Lymphatic: No lymphadenopathy  Psychiatry: AAOx3, mood & affect appropriate  Skin: No rashes, ecchymoses, or cyanosis                          12.5   11.33 )-----------( 186      ( 23 Oct 2023 06:52 )             39.5     10-23    142  |  103  |  45<H>  ----------------------------<  75  3.3<L>   |  24  |  1.78<H>    Ca    9.3      23 Oct 2023 06:52  Phos  5.1     10-22  Mg     2.0     10-22    TPro  6.8  /  Alb  3.6  /  TBili  0.5  /  DBili  x   /  AST  21  /  ALT  36  /  AlkPhos  72  10-23  PT/INR - ( 22 Oct 2023 00:47 )   PT: 13.5 sec;   INR: 1.23 ratio     PTT - ( 22 Oct 2023 00:47 )  PTT:32.0 sec

## 2023-10-23 NOTE — PROGRESS NOTE ADULT - ATTENDING COMMENTS
57 YO M with a history of CVA with residual mild R paresthesias, second degree Mobitz II heart block s/p DC-ICD 12/2022 (initial concern for sarcoid but negative biopsy/PET post procedure), DM2 (A1c 8.4%), and HTN who was admitted to [facility] with chest pain and dyspnea, found to have NSTEMI with markedly elevated troponins and new severe LV dysfunction with regional wall motion abnormalities as well as + enterovirus. He required NIPPV and was diuresed before being transferred to [facility] where LHC performed which revealed severe 3v CAD with critical proximal LAD involvement. CTS was consulted for CABG evaluation and HF consulted for comanagement.    He had concerning features including resting tachcyardia and poor non-invasive hemodynamics His cMRI also showed mostly nonviable LAD territory. He underwent RHC which showed moderately elevated L > R filing pressures and low-normal cardiac output and was being considered for PCI however he decompensated 10/20 with volume overload and signs of shock after beta blocker was increased for sinus tachycardia. He was transferred to CICU and began to improve. His current hemodynamics suggest volume overload with preserved cardiac output.     His tachycardia remains concerning but has had > 48 hours of preserved CO on RHC off beta blocker and is diuresing well and tolerating afterload reduction so does not imminently need advanced therapies but remains a higher risk patient. Will try to optimize and can consider PCI of RCA/LCx if he improves.     HEMODYNAMICS  10/21: RA 13, PA 50/33, PCWP 33, PA sat 66% with Cris CO/CI 4.4/2.4, BP 96/59 (70) with SVR 1036  10/22: pending for when patient returns to bed     REVIEW OF STUDIES  EKG: sinus tachycardia, septal q-waves, left axis deviation   TTE 10/16/23: LV 5.5 cm, LVEF 20% with regional WMAs worse in the apex, LVOT VTI 8 cm, normal RV size/function, severe functional MR, small pericardial effusion, estimated RA pressure 8 mmHg   TTE 12/2022: normal LVEF   LHC: 95% discrete proximal LAD disease and sequential multifocal disease with good distal target, 80-90% proximal LCx disease just prior to marginals, severe multifocal RCA disease with good targets   cMRI: LVEF 13%, greater than 50% LGE in mid LAD territory     PLAN  # Cardiogenic shock  - Improving, now off inotropes  - PAC removed 10/22    # Acute systolic heart failure  - Etiology: ischemic  - cont hold BB given Low flow and requiring Inotropes  - will cont hydral 37.5mg TID  - will add isrdil 10mg tid  - will cont Bumex drip 1mg/hr till tomorrow    - Device: has ICD in place and notably with high burden of RV pacing, will need CRT-D upgrade before d/c    - Advanced therapies: severe LV dysfunction with tachycardia and limited viability in LAD territory  however has had multiple days of preserved cardiac output on RHC. he also may benefit from PCI of LCx/RCA. remains with high risk features and may need VAD/transplant evaluation in the future. ABO AB but appears to have deficits from his CVA.     # Severe functional MR  - needs reassessment after revascularization/GDMT    # Severe 3v CAD  - On ASA/statin, cMRI with no viability in LAD territory.  - Deemed too high risk for CABG  - Possible PCI pending clinical progress.  will d/w interventional regarding timing of PCI to Lcx and RCA    # LAURA  - improving  - continue to monitor with diuresis    # CXR with b/l opacities  will get CT chest non con  will get RVP/covid panel    will cont follow  plan d/w pt and family at bedside.

## 2023-10-23 NOTE — PROGRESS NOTE ADULT - ASSESSMENT
57 yo Male pmhx CVA with mild left sided deficits, HTN, DM2, vertigo, HLD, Mobitz II s/pp Medtronic dual chamber ICD (22) initially presented to [facility] from home with complaints of dyspnea and chest pain and was admitted w/ acute hypoxic respiratory failure likely due to acute pulmonary edema due to acute HFrEF in setting of acute NSTEMI, LAURA and enterovirus infection.    Pt with brief MICU stay at [facility] requiring bipap (no intubation), was treated for PNA with cefepime, and was found to have TTE done 23 showing EF 20% with severely decreased LVSF, multiple regional wall motion abnormalities, and elevated LV end diastolic pressure. Pt was transferred to [facility] for cardiac cath evaluation.     S/p LHC on 10/16 w/ 3v disease and RHC on 10/19 for hemodynamic assessment- showed CVP of 10, PCWP 29 and CI of 2.3, started empirically on  for shortness of breath but repeat RHC with almost identical numbers now. Admitted to CICU for CABG eval vs. advanced therapy vs. high risk PCI. Deemed to be too high risk for CABG and given hydralazine for afterload reduction and diuresis with bumex. Heart Failure team following and currently holding off on advanced therapy discussion.     Pt now transferred to the floor. Currently in bed, appears tired, denies pain or dyspnea. Douglass in place. Tachy to 140s on tele overnight.

## 2023-10-23 NOTE — PATIENT PROFILE ADULT - INTERNATIONAL TRAVEL
Usama Gamez, VIC sent to Kacie Lawrence CMA    Caller: Unspecified (Today,  1:53 PM)  She needs to go get an x-ray done of her thoracic spine as well as her chest today.  I have put the orders in the chart for [facility]   No

## 2023-10-23 NOTE — PROGRESS NOTE ADULT - ASSESSMENT
57 YO M with a history of CVA with residual mild R paresthesias, second degree Mobitz II heart block s/p DC-ICD 12/2022 (initial concern for sarcoid but negative biopsy/PET post procedure), DM2 (A1c 8.4%), and HTN who was admitted to [facility] with chest pain and dyspnea, found to have NSTEMI with markedly elevated troponins and new severe LV dysfunction with regional wall motion abnormalities as well as + enterovirus. He required NIPPV and was diuresed before being transferred to [facility] where LHC performed which revealed severe 3v CAD with critical proximal LAD involvement. CTS was consulted for CABG evaluation and HF consulted for comanagement.    He ultimately underwent RHC with revealed elevated wedge but normal cardiac output. Within the past 24 hours patient has become more tachycardic, is cool on exam and noted to have elevated JVP. He was transferred to CICU for swan placement and closer observation of hemodynamics. At this time patient would be consider poor candidate for CABG however would  benefit from high risk PCI with the potential need for advance therapies      REVIEW OF STUDIES  RHC 10/19: RA 10, RV 47/9/13, Wedge 29, PA 41/26/33, CO/CI 4.4/2.3, PA sat 57.3  EKG: sinus tachycardia, septal q-waves, left axis deviation   TTE 10/16/23: LV 5.5 cm, LVEF 20% with regional WMAs worse in the apex, LVOT VTI 8 cm, normal RV size/function, severe functional MR, small pericardial effusion, estimated RA pressure 8 mmHg   TTE 12/2022: normal LVEF   [facility]: 95% discrete proximal LAD disease and sequential multifocal disease with good distal target, 80-90% proximal LCx disease just prior to marginals, severe multifocal RCA disease with good targets     Hemodynamics:  10/21/23: RA 13 with V-wave 21, PA 50/33, PCW 33, MvO2 68.2, Cris CO/CI 4.4/2.56

## 2023-10-23 NOTE — PROGRESS NOTE ADULT - SUBJECTIVE AND OBJECTIVE BOX
Medicine accept note:       Patient is a 58y old  Male who presents with a chief complaint of NSTEMI (22 Oct 2023 08:30)      SUBJECTIVE / OVERNIGHT EVENTS: Pt transferred out of the CCU. Currently in bed, appears tired, denies dyspnea or pain.     MEDICATIONS  (STANDING):  aspirin enteric coated 81 milliGRAM(s) Oral daily  atorvastatin 80 milliGRAM(s) Oral at bedtime  buMETAnide Infusion 1 mG/Hr (5 mL/Hr) IV Continuous <Continuous>  chlorhexidine 2% Cloths 1 Application(s) Topical daily  dextrose 5%. 1000 milliLiter(s) (50 mL/Hr) IV Continuous <Continuous>  dextrose 5%. 1000 milliLiter(s) (100 mL/Hr) IV Continuous <Continuous>  dextrose 50% Injectable 25 Gram(s) IV Push once  dextrose 50% Injectable 12.5 Gram(s) IV Push once  dextrose 50% Injectable 25 Gram(s) IV Push once  glucagon  Injectable 1 milliGRAM(s) IntraMuscular once  heparin   Injectable 5000 Unit(s) SubCutaneous every 8 hours  hydrALAZINE 37.5 milliGRAM(s) Oral every 8 hours  insulin glargine Injectable (LANTUS) 30 Unit(s) SubCutaneous at bedtime  insulin lispro (ADMELOG) corrective regimen sliding scale   SubCutaneous three times a day before meals  insulin lispro (ADMELOG) corrective regimen sliding scale   SubCutaneous at bedtime  insulin lispro Injectable (ADMELOG) 10 Unit(s) SubCutaneous three times a day before meals  pantoprazole    Tablet 40 milliGRAM(s) Oral before breakfast  senna 2 Tablet(s) Oral at bedtime  tamsulosin 0.4 milliGRAM(s) Oral at bedtime    MEDICATIONS  (PRN):  acetaminophen     Tablet .. 650 milliGRAM(s) Oral every 6 hours PRN Temp greater or equal to 38C (100.4F), Mild Pain (1 - 3)  albuterol    90 MICROgram(s) HFA Inhaler 2 Puff(s) Inhalation every 6 hours PRN Shortness of Breath and/or Wheezing  benzocaine/menthol Lozenge 1 Lozenge Oral every 2 hours PRN Sore Throat  dextrose Oral Gel 15 Gram(s) Oral once PRN Blood Glucose LESS THAN 70 milliGRAM(s)/deciliter  guaiFENesin Oral Liquid (Sugar-Free) 100 milliGRAM(s) Oral every 6 hours PRN Cough  polyethylene glycol 3350 17 Gram(s) Oral daily PRN Constipation      CAPILLARY BLOOD GLUCOSE      POCT Blood Glucose.: 74 mg/dL (23 Oct 2023 07:47)  POCT Blood Glucose.: 91 mg/dL (22 Oct 2023 22:26)  POCT Blood Glucose.: 91 mg/dL (22 Oct 2023 21:18)  POCT Blood Glucose.: 139 mg/dL (22 Oct 2023 17:05)    I&O's Summary    22 Oct 2023 07:01  -  23 Oct 2023 07:00  --------------------------------------------------------  IN: 1095 mL / OUT: 2905 mL / NET: -1810 mL    23 Oct 2023 07:01  -  23 Oct 2023 10:07  --------------------------------------------------------  IN: 10 mL / OUT: 0 mL / NET: 10 mL        PHYSICAL EXAM:  T(C): 37.2 (10-23-23 @ 04:35), Max: 37.2 (10-23-23 @ 04:35)  HR: 128 (10-23-23 @ 05:19) (128 - 133)  BP: 121/88 (10-23-23 @ 05:19) (78/- - 123/81)  RR: 18 (10-23-23 @ 05:19) (18 - 24)  SpO2: 93% (10-23-23 @ 05:19) (93% - 100%)  CONSTITUTIONAL: NAD, well-developed, well-groomed  EYES: PERRLA; conjunctiva and sclera clear  ENMT: Moist oral mucosa, no pharyngeal injection or exudates; normal dentition  NECK: Supple, no palpable masses; no thyromegaly  RESPIRATORY: Decreased breath sounds at bases  CARDIOVASCULAR: Regular rate and rhythm, tachycardia; No lower extremity edema; Peripheral pulses are 2+ bilaterally  ABDOMEN: Nontender to palpation, normoactive bowel sounds, no rebound/guarding; No hepatosplenomegaly  MUSCULOSKELETAL:  No clubbing or cyanosis of digits; no joint swelling or tenderness to palpation  PSYCH: A+O to person, place, and time; affect appropriate  NEUROLOGY: CN 2-12 are intact and symmetric; no gross sensory deficits   SKIN: No rashes; no palpable lesions    LABS:                        12.5   11.33 )-----------( 186      ( 23 Oct 2023 06:52 )             39.5     10-23    142  |  103  |  45<H>  ----------------------------<  75  3.3<L>   |  24  |  1.78<H>    Ca    9.3      23 Oct 2023 06:52  Phos  5.1     10-22  Mg     2.0     10-22    TPro  6.8  /  Alb  3.6  /  TBili  0.5  /  DBili  x   /  AST  21  /  ALT  36  /  AlkPhos  72  10-23    PT/INR - ( 22 Oct 2023 00:47 )   PT: 13.5 sec;   INR: 1.23 ratio         PTT - ( 22 Oct 2023 00:47 )  PTT:32.0 sec      Urinalysis Basic - ( 23 Oct 2023 06:52 )    Color: x / Appearance: x / SG: x / pH: x  Gluc: 75 mg/dL / Ketone: x  / Bili: x / Urobili: x   Blood: x / Protein: x / Nitrite: x   Leuk Esterase: x / RBC: x / WBC x   Sq Epi: x / Non Sq Epi: x / Bacteria: x        RADIOLOGY & ADDITIONAL TESTS:    Imaging Personally Reviewed:    Consultant(s) Notes Reviewed:      Care Discussed with Consultants/Other Providers: CIARRA Tracy

## 2023-10-23 NOTE — PROGRESS NOTE ADULT - PROBLEM SELECTOR PLAN 2
3-vessel CAD.     On ASA/statin    S/p cMRI on 10/18 revealed there is greater than 50% thickness subendocardial LGE within the mid anteroseptum, anterior and anterolateral segments and apical segments.    Currently being evaluated for high risk PCI.

## 2023-10-23 NOTE — PROGRESS NOTE ADULT - PROBLEM SELECTOR PLAN 6
Heparin sq for DVT prophylaxis.     Douglass in place, on Flomax. No reported retention- voiding trial today.

## 2023-10-23 NOTE — PROGRESS NOTE ADULT - PROBLEM SELECTOR PLAN 1
- Etiology: ischemic  - GDMT: discontinued Torpol XL 10/20 and deferring RAASi/MRA/SGLT2i given concerns of cardiogenic shock.   - Increase Hydralazine to 37.5mg q8h  - Diuretics: decrease Bumex gtt to 1mg/hr to keep net negative 2L over 24hrs  - Device: has ICD in place and notably with high burden of RV pacing  - Please check full set of PA catheter numbers along with mVO2 prior to removing swan today. And please document the numbers in a chart note  - Advanced therapies: severe LV dysfunction with tachycardia and poor non-invasive hemodynamics concerning, currently holding off on launching VAD/transplant eval however may need to reassess. Of note he has good support and no clear psychosocial red flags. ABO AB. would need neurologic assessment with prior CVA and ideally improved conditioning given ongoing prolonged hospitalization. - Etiology: ischemic  - GDMT: discontinued Torpol XL 10/20 and deferring RAASi/MRA/SGLT2i given concerns of cardiogenic shock.   - Increase Hydralazine to 37.5mg q8h, start ISDN 10 TID  - Diuretics: Cont Bumex gtt to keep net negative 2L over 24hrs  - Device: has ICD in place and notably with high burden of RV pacing  - Advanced therapies: severe LV dysfunction with tachycardia and poor non-invasive hemodynamics concerning, currently holding off on launching VAD/transplant eval however may need to reassess. Of note he has good support and no clear psychosocial red flags. ABO AB. would need neurologic assessment with prior CVA and ideally improved conditioning given ongoing prolonged hospitalization.

## 2023-10-23 NOTE — PROGRESS NOTE ADULT - PROBLEM SELECTOR PLAN 1
ECHO 10/16: EF 20%, severely reduced LVSF. HF team following. Discontinued Torpol XL 10/20 and deferring RAASi/MRA/SGLT2i given concerns of cardiogenic shock. On Hydralazine to 37.5mg q8h    On Bumex gtt.     Per HF team: severe LV dysfunction with tachycardia and poor non-invasive hemodynamics concerning, currently holding off on launching VAD/transplant eval however may need to reassess. Of note he has good support and no clear psychosocial red flags. ABO AB. would need neurologic assessment with prior CVA and ideally improved conditioning given ongoing prolonged hospitalization.

## 2023-10-23 NOTE — PROGRESS NOTE ADULT - PROBLEM SELECTOR PLAN 2
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  - currently being evaluated for high risk PCI. 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  - currently being evaluated for high risk PCI     > Not able to lay flat, will reassess in the AM yes

## 2023-10-24 LAB
ALBUMIN SERPL ELPH-MCNC: 3.2 G/DL — LOW (ref 3.3–5)
ALBUMIN SERPL ELPH-MCNC: 3.2 G/DL — LOW (ref 3.3–5)
ALP SERPL-CCNC: 67 U/L — SIGNIFICANT CHANGE UP (ref 40–120)
ALP SERPL-CCNC: 67 U/L — SIGNIFICANT CHANGE UP (ref 40–120)
ALT FLD-CCNC: 35 U/L — SIGNIFICANT CHANGE UP (ref 10–45)
ALT FLD-CCNC: 35 U/L — SIGNIFICANT CHANGE UP (ref 10–45)
ANION GAP SERPL CALC-SCNC: 14 MMOL/L — SIGNIFICANT CHANGE UP (ref 5–17)
ANION GAP SERPL CALC-SCNC: 14 MMOL/L — SIGNIFICANT CHANGE UP (ref 5–17)
AST SERPL-CCNC: 22 U/L — SIGNIFICANT CHANGE UP (ref 10–40)
AST SERPL-CCNC: 22 U/L — SIGNIFICANT CHANGE UP (ref 10–40)
BILIRUB SERPL-MCNC: 0.6 MG/DL — SIGNIFICANT CHANGE UP (ref 0.2–1.2)
BILIRUB SERPL-MCNC: 0.6 MG/DL — SIGNIFICANT CHANGE UP (ref 0.2–1.2)
BUN SERPL-MCNC: 42 MG/DL — HIGH (ref 7–23)
BUN SERPL-MCNC: 42 MG/DL — HIGH (ref 7–23)
CALCIUM SERPL-MCNC: 9.2 MG/DL — SIGNIFICANT CHANGE UP (ref 8.4–10.5)
CALCIUM SERPL-MCNC: 9.2 MG/DL — SIGNIFICANT CHANGE UP (ref 8.4–10.5)
CHLORIDE SERPL-SCNC: 101 MMOL/L — SIGNIFICANT CHANGE UP (ref 96–108)
CHLORIDE SERPL-SCNC: 101 MMOL/L — SIGNIFICANT CHANGE UP (ref 96–108)
CO2 SERPL-SCNC: 26 MMOL/L — SIGNIFICANT CHANGE UP (ref 22–31)
CO2 SERPL-SCNC: 26 MMOL/L — SIGNIFICANT CHANGE UP (ref 22–31)
CREAT SERPL-MCNC: 1.78 MG/DL — HIGH (ref 0.5–1.3)
CREAT SERPL-MCNC: 1.78 MG/DL — HIGH (ref 0.5–1.3)
EGFR: 44 ML/MIN/1.73M2 — LOW
EGFR: 44 ML/MIN/1.73M2 — LOW
GLUCOSE BLDC GLUCOMTR-MCNC: 101 MG/DL — HIGH (ref 70–99)
GLUCOSE BLDC GLUCOMTR-MCNC: 101 MG/DL — HIGH (ref 70–99)
GLUCOSE BLDC GLUCOMTR-MCNC: 109 MG/DL — HIGH (ref 70–99)
GLUCOSE BLDC GLUCOMTR-MCNC: 109 MG/DL — HIGH (ref 70–99)
GLUCOSE BLDC GLUCOMTR-MCNC: 183 MG/DL — HIGH (ref 70–99)
GLUCOSE BLDC GLUCOMTR-MCNC: 183 MG/DL — HIGH (ref 70–99)
GLUCOSE BLDC GLUCOMTR-MCNC: 309 MG/DL — HIGH (ref 70–99)
GLUCOSE BLDC GLUCOMTR-MCNC: 309 MG/DL — HIGH (ref 70–99)
GLUCOSE SERPL-MCNC: 91 MG/DL — SIGNIFICANT CHANGE UP (ref 70–99)
GLUCOSE SERPL-MCNC: 91 MG/DL — SIGNIFICANT CHANGE UP (ref 70–99)
HCT VFR BLD CALC: 41.5 % — SIGNIFICANT CHANGE UP (ref 39–50)
HCT VFR BLD CALC: 41.5 % — SIGNIFICANT CHANGE UP (ref 39–50)
HGB BLD-MCNC: 13 G/DL — SIGNIFICANT CHANGE UP (ref 13–17)
HGB BLD-MCNC: 13 G/DL — SIGNIFICANT CHANGE UP (ref 13–17)
MCHC RBC-ENTMCNC: 25.3 PG — LOW (ref 27–34)
MCHC RBC-ENTMCNC: 25.3 PG — LOW (ref 27–34)
MCHC RBC-ENTMCNC: 31.3 GM/DL — LOW (ref 32–36)
MCHC RBC-ENTMCNC: 31.3 GM/DL — LOW (ref 32–36)
MCV RBC AUTO: 80.7 FL — SIGNIFICANT CHANGE UP (ref 80–100)
MCV RBC AUTO: 80.7 FL — SIGNIFICANT CHANGE UP (ref 80–100)
NRBC # BLD: 0 /100 WBCS — SIGNIFICANT CHANGE UP (ref 0–0)
NRBC # BLD: 0 /100 WBCS — SIGNIFICANT CHANGE UP (ref 0–0)
PLATELET # BLD AUTO: 170 K/UL — SIGNIFICANT CHANGE UP (ref 150–400)
PLATELET # BLD AUTO: 170 K/UL — SIGNIFICANT CHANGE UP (ref 150–400)
POTASSIUM SERPL-MCNC: 3.6 MMOL/L — SIGNIFICANT CHANGE UP (ref 3.5–5.3)
POTASSIUM SERPL-MCNC: 3.6 MMOL/L — SIGNIFICANT CHANGE UP (ref 3.5–5.3)
POTASSIUM SERPL-SCNC: 3.6 MMOL/L — SIGNIFICANT CHANGE UP (ref 3.5–5.3)
POTASSIUM SERPL-SCNC: 3.6 MMOL/L — SIGNIFICANT CHANGE UP (ref 3.5–5.3)
PROT SERPL-MCNC: 6.7 G/DL — SIGNIFICANT CHANGE UP (ref 6–8.3)
PROT SERPL-MCNC: 6.7 G/DL — SIGNIFICANT CHANGE UP (ref 6–8.3)
RAPID RVP RESULT: SIGNIFICANT CHANGE UP
RAPID RVP RESULT: SIGNIFICANT CHANGE UP
RBC # BLD: 5.14 M/UL — SIGNIFICANT CHANGE UP (ref 4.2–5.8)
RBC # BLD: 5.14 M/UL — SIGNIFICANT CHANGE UP (ref 4.2–5.8)
RBC # FLD: 15.6 % — HIGH (ref 10.3–14.5)
RBC # FLD: 15.6 % — HIGH (ref 10.3–14.5)
SARS-COV-2 RNA SPEC QL NAA+PROBE: SIGNIFICANT CHANGE UP
SARS-COV-2 RNA SPEC QL NAA+PROBE: SIGNIFICANT CHANGE UP
SODIUM SERPL-SCNC: 141 MMOL/L — SIGNIFICANT CHANGE UP (ref 135–145)
SODIUM SERPL-SCNC: 141 MMOL/L — SIGNIFICANT CHANGE UP (ref 135–145)
WBC # BLD: 9.67 K/UL — SIGNIFICANT CHANGE UP (ref 3.8–10.5)
WBC # BLD: 9.67 K/UL — SIGNIFICANT CHANGE UP (ref 3.8–10.5)
WBC # FLD AUTO: 9.67 K/UL — SIGNIFICANT CHANGE UP (ref 3.8–10.5)
WBC # FLD AUTO: 9.67 K/UL — SIGNIFICANT CHANGE UP (ref 3.8–10.5)

## 2023-10-24 PROCEDURE — 99232 SBSQ HOSP IP/OBS MODERATE 35: CPT

## 2023-10-24 PROCEDURE — 71250 CT THORAX DX C-: CPT | Mod: 26

## 2023-10-24 RX ORDER — BUMETANIDE 0.25 MG/ML
2 INJECTION INTRAMUSCULAR; INTRAVENOUS DAILY
Refills: 0 | Status: DISCONTINUED | OUTPATIENT
Start: 2023-10-25 | End: 2023-11-07

## 2023-10-24 RX ORDER — POTASSIUM CHLORIDE 20 MEQ
40 PACKET (EA) ORAL ONCE
Refills: 0 | Status: COMPLETED | OUTPATIENT
Start: 2023-10-24 | End: 2023-10-24

## 2023-10-24 RX ADMIN — Medication 1: at 11:39

## 2023-10-24 RX ADMIN — Medication 37.5 MILLIGRAM(S): at 05:44

## 2023-10-24 RX ADMIN — Medication 81 MILLIGRAM(S): at 13:00

## 2023-10-24 RX ADMIN — HEPARIN SODIUM 5000 UNIT(S): 5000 INJECTION INTRAVENOUS; SUBCUTANEOUS at 22:26

## 2023-10-24 RX ADMIN — SENNA PLUS 2 TABLET(S): 8.6 TABLET ORAL at 22:27

## 2023-10-24 RX ADMIN — Medication 37.5 MILLIGRAM(S): at 22:27

## 2023-10-24 RX ADMIN — Medication 8 UNIT(S): at 16:49

## 2023-10-24 RX ADMIN — CHLORHEXIDINE GLUCONATE 1 APPLICATION(S): 213 SOLUTION TOPICAL at 22:05

## 2023-10-24 RX ADMIN — HEPARIN SODIUM 5000 UNIT(S): 5000 INJECTION INTRAVENOUS; SUBCUTANEOUS at 05:43

## 2023-10-24 RX ADMIN — PANTOPRAZOLE SODIUM 40 MILLIGRAM(S): 20 TABLET, DELAYED RELEASE ORAL at 05:49

## 2023-10-24 RX ADMIN — Medication 4: at 16:49

## 2023-10-24 RX ADMIN — Medication 8 UNIT(S): at 11:39

## 2023-10-24 RX ADMIN — BUMETANIDE 5 MG/HR: 0.25 INJECTION INTRAMUSCULAR; INTRAVENOUS at 07:00

## 2023-10-24 RX ADMIN — ISOSORBIDE DINITRATE 10 MILLIGRAM(S): 5 TABLET ORAL at 16:50

## 2023-10-24 RX ADMIN — Medication 40 MILLIEQUIVALENT(S): at 08:45

## 2023-10-24 RX ADMIN — INSULIN GLARGINE 24 UNIT(S): 100 INJECTION, SOLUTION SUBCUTANEOUS at 22:26

## 2023-10-24 RX ADMIN — TAMSULOSIN HYDROCHLORIDE 0.4 MILLIGRAM(S): 0.4 CAPSULE ORAL at 22:28

## 2023-10-24 RX ADMIN — HEPARIN SODIUM 5000 UNIT(S): 5000 INJECTION INTRAVENOUS; SUBCUTANEOUS at 13:00

## 2023-10-24 RX ADMIN — ATORVASTATIN CALCIUM 80 MILLIGRAM(S): 80 TABLET, FILM COATED ORAL at 22:27

## 2023-10-24 RX ADMIN — ISOSORBIDE DINITRATE 10 MILLIGRAM(S): 5 TABLET ORAL at 05:44

## 2023-10-24 RX ADMIN — Medication 8 UNIT(S): at 07:48

## 2023-10-24 RX ADMIN — ISOSORBIDE DINITRATE 10 MILLIGRAM(S): 5 TABLET ORAL at 11:39

## 2023-10-24 NOTE — DIETITIAN INITIAL EVALUATION ADULT - PROBLEM SELECTOR PLAN 2
TTE done 9/26 showing EF 20% with severely decreased LVSF, multiple wall motion abnormalities, elevated LV end diastolic pressure.   - Findings concerning for ischemic insult  - Does not appear severely volume overloaded or decompensated   - Will hold off bumex 2mg PO qd (getting while at [facility]) given tachycardia  - labs from earlier today reviewed; no significant electrolyte abnormalities. Maintain K>4, Mg >2,Phos>3  - Strict I/Os, daily weights  - Continue metoprolol succinate 25mg qd   - F/u cards recs and plan for cardiac cath

## 2023-10-24 NOTE — PROGRESS NOTE ADULT - PROBLEM SELECTOR PLAN 2
3-vessel CAD.     On ASA/statin    S/p cMRI on 10/18 revealed there is greater than 50% thickness subendocardial LGE within the mid anteroseptum, anterior and anterolateral segments and apical segments.    Currently being evaluated for high risk PCI

## 2023-10-24 NOTE — PROGRESS NOTE ADULT - SUBJECTIVE AND OBJECTIVE BOX
[facility] Division of Hospital Medicine  Jaun Junior DO  Pager (M-F, 8A-5P):  MS Teams PREFERRED  Other Times:  [phone]      SUBJECTIVE / OVERNIGHT EVENTS:  Patient seen at bedside, no acute distress, wife seen at bedside. He states that he is awaiting cardiology recommendations regarding next step of plan of care. Denies chest pain, nausea, vomiting at this time.     MEDICATIONS  (STANDING):  aspirin enteric coated 81 milliGRAM(s) Oral daily  atorvastatin 80 milliGRAM(s) Oral at bedtime  chlorhexidine 2% Cloths 1 Application(s) Topical daily  dextrose 5%. 1000 milliLiter(s) (100 mL/Hr) IV Continuous <Continuous>  dextrose 5%. 1000 milliLiter(s) (50 mL/Hr) IV Continuous <Continuous>  dextrose 50% Injectable 12.5 Gram(s) IV Push once  dextrose 50% Injectable 25 Gram(s) IV Push once  dextrose 50% Injectable 25 Gram(s) IV Push once  glucagon  Injectable 1 milliGRAM(s) IntraMuscular once  heparin   Injectable 5000 Unit(s) SubCutaneous every 8 hours  hydrALAZINE 37.5 milliGRAM(s) Oral every 8 hours  insulin glargine Injectable (LANTUS) 24 Unit(s) SubCutaneous at bedtime  insulin lispro (ADMELOG) corrective regimen sliding scale   SubCutaneous at bedtime  insulin lispro (ADMELOG) corrective regimen sliding scale   SubCutaneous three times a day before meals  insulin lispro Injectable (ADMELOG) 8 Unit(s) SubCutaneous three times a day before meals  isosorbide   dinitrate Tablet (ISORDIL) 10 milliGRAM(s) Oral three times a day  pantoprazole    Tablet 40 milliGRAM(s) Oral before breakfast  senna 2 Tablet(s) Oral at bedtime  tamsulosin 0.4 milliGRAM(s) Oral at bedtime    MEDICATIONS  (PRN):  acetaminophen     Tablet .. 650 milliGRAM(s) Oral every 6 hours PRN Temp greater or equal to 38C (100.4F), Mild Pain (1 - 3)  albuterol    90 MICROgram(s) HFA Inhaler 2 Puff(s) Inhalation every 6 hours PRN Shortness of Breath and/or Wheezing  benzocaine/menthol Lozenge 1 Lozenge Oral every 2 hours PRN Sore Throat  dextrose Oral Gel 15 Gram(s) Oral once PRN Blood Glucose LESS THAN 70 milliGRAM(s)/deciliter  guaiFENesin Oral Liquid (Sugar-Free) 100 milliGRAM(s) Oral every 6 hours PRN Cough  polyethylene glycol 3350 17 Gram(s) Oral daily PRN Constipation      I&O's Summary    23 Oct 2023 07:01  -  24 Oct 2023 07:00  --------------------------------------------------------  IN: 355 mL / OUT: 3300 mL / NET: -2945 mL    24 Oct 2023 07:01  -  24 Oct 2023 13:57  --------------------------------------------------------  IN: 270 mL / OUT: 350 mL / NET: -80 mL        PHYSICAL EXAM:  Vital Signs Last 24 Hrs  T(C): 36.8 (24 Oct 2023 08:24), Max: 37 (24 Oct 2023 04:35)  T(F): 98.2 (24 Oct 2023 08:24), Max: 98.6 (24 Oct 2023 04:35)  HR: 128 (24 Oct 2023 08:24) (124 - 132)  BP: 91/59 (24 Oct 2023 13:01) (91/59 - 105/77)  BP(mean): 70 (24 Oct 2023 13:01) (70 - 80)  RR: 18 (24 Oct 2023 08:24) (18 - 18)  SpO2: 95% (24 Oct 2023 08:24) (95% - 98%)    Parameters below as of 24 Oct 2023 08:24  Patient On (Oxygen Delivery Method): room air      CONSTITUTIONAL: NAD, well-developed, well-groomed  EYES: PERRLA; conjunctiva and sclera clear  ENMT: Moist oral mucosa, no pharyngeal injection or exudates; normal dentition  NECK: Supple, no palpable masses; no thyromegaly  RESPIRATORY: Decreased breath sounds at bases  CARDIOVASCULAR: Regular rate and rhythm, tachycardia; No lower extremity edema; Peripheral pulses are 2+ bilaterally  ABDOMEN: Nontender to palpation, normoactive bowel sounds, no rebound/guarding; No hepatosplenomegaly  MUSCULOSKELETAL:  No clubbing or cyanosis of digits; no joint swelling or tenderness to palpation  PSYCH: A+O to person, place, and time; affect appropriate    LABS:                        13.0   9.67  )-----------( 170      ( 24 Oct 2023 07:29 )             41.5     10-24    141  |  101  |  42<H>  ----------------------------<  91  3.6   |  26  |  1.78<H>    Ca    9.2      24 Oct 2023 07:29    TPro  6.7  /  Alb  3.2<L>  /  TBili  0.6  /  DBili  x   /  AST  22  /  ALT  35  /  AlkPhos  67  10-24          Urinalysis Basic - ( 24 Oct 2023 07:29 )    Color: x / Appearance: x / SG: x / pH: x  Gluc: 91 mg/dL / Ketone: x  / Bili: x / Urobili: x   Blood: x / Protein: x / Nitrite: x   Leuk Esterase: x / RBC: x / WBC x   Sq Epi: x / Non Sq Epi: x / Bacteria: x          RADIOLOGY & ADDITIONAL TESTS:  Results Reviewed: CBC/CMP personally reviewed by me Hgb 13.0 WBC 9.67 Cr 1.78  Imaging Personally Reviewed:  Electrocardiogram Personally Reviewed:    COORDINATION OF CARE:  Care Discussed with Consultants/Other Providers [Y/N]: Y   Prior or Outpatient Records Reviewed [Y/N]: Y

## 2023-10-24 NOTE — PROGRESS NOTE ADULT - NSPROGADDITIONALINFOA_GEN_ALL_CORE
Discussed plan of care at bedside with wife Mame Marquez regarding Heart Failure team recommendations.

## 2023-10-24 NOTE — DIETITIAN INITIAL EVALUATION ADULT - PERTINENT LABORATORY DATA
10-24    141  |  101  |  42<H>  ----------------------------<  91  3.6   |  26  |  1.78<H>    Ca    9.2      24 Oct 2023 07:29    TPro  6.7  /  Alb  3.2<L>  /  TBili  0.6  /  DBili  x   /  AST  22  /  ALT  35  /  AlkPhos  67  10-24  POCT Blood Glucose.: 101 mg/dL (10-24-23 @ 07:40)  A1C with Estimated Average Glucose Result: 8.4 % (10-14-23 @ 06:18)

## 2023-10-24 NOTE — DIETITIAN INITIAL EVALUATION ADULT - PROBLEM SELECTOR PLAN 6
A1C 6.9. FS >200s. [facility]  - Continue Lantus 40u qhs  - Continue Admelog premeals 20u TID before meals  - Low insulin sliding scale  - FS before meals and at bedtime  - CC diet

## 2023-10-24 NOTE — DIETITIAN INITIAL EVALUATION ADULT - ORAL INTAKE PTA/DIET HISTORY
visited pt at bedside this morning. reports good PO intake PTA. Tries to eat healthy, fresh meals. enjoys snacking on nuts. Confirms PMHx of DM2, does not check blood glucose levels daily.  visited pt at bedside this morning. reports good PO intake PTA. Tries to eat healthy, fresh meals. enjoys snacking on nuts. Confirms PMHx of DM2, does not check blood glucose levels daily. metformin noted in outpatient medications.

## 2023-10-24 NOTE — DIETITIAN INITIAL EVALUATION ADULT - PERTINENT MEDS FT
MEDICATIONS  (STANDING):  aspirin enteric coated 81 milliGRAM(s) Oral daily  atorvastatin 80 milliGRAM(s) Oral at bedtime  buMETAnide Infusion 1 mG/Hr (5 mL/Hr) IV Continuous <Continuous>  chlorhexidine 2% Cloths 1 Application(s) Topical daily  dextrose 5%. 1000 milliLiter(s) (100 mL/Hr) IV Continuous <Continuous>  dextrose 5%. 1000 milliLiter(s) (50 mL/Hr) IV Continuous <Continuous>  dextrose 50% Injectable 12.5 Gram(s) IV Push once  dextrose 50% Injectable 25 Gram(s) IV Push once  dextrose 50% Injectable 25 Gram(s) IV Push once  glucagon  Injectable 1 milliGRAM(s) IntraMuscular once  heparin   Injectable 5000 Unit(s) SubCutaneous every 8 hours  hydrALAZINE 37.5 milliGRAM(s) Oral every 8 hours  insulin glargine Injectable (LANTUS) 24 Unit(s) SubCutaneous at bedtime  insulin lispro (ADMELOG) corrective regimen sliding scale   SubCutaneous three times a day before meals  insulin lispro (ADMELOG) corrective regimen sliding scale   SubCutaneous at bedtime  insulin lispro Injectable (ADMELOG) 8 Unit(s) SubCutaneous three times a day before meals  isosorbide   dinitrate Tablet (ISORDIL) 10 milliGRAM(s) Oral three times a day  pantoprazole    Tablet 40 milliGRAM(s) Oral before breakfast  senna 2 Tablet(s) Oral at bedtime  tamsulosin 0.4 milliGRAM(s) Oral at bedtime    MEDICATIONS  (PRN):  acetaminophen     Tablet .. 650 milliGRAM(s) Oral every 6 hours PRN Temp greater or equal to 38C (100.4F), Mild Pain (1 - 3)  albuterol    90 MICROgram(s) HFA Inhaler 2 Puff(s) Inhalation every 6 hours PRN Shortness of Breath and/or Wheezing  benzocaine/menthol Lozenge 1 Lozenge Oral every 2 hours PRN Sore Throat  dextrose Oral Gel 15 Gram(s) Oral once PRN Blood Glucose LESS THAN 70 milliGRAM(s)/deciliter  guaiFENesin Oral Liquid (Sugar-Free) 100 milliGRAM(s) Oral every 6 hours PRN Cough  polyethylene glycol 3350 17 Gram(s) Oral daily PRN Constipation   MEDICATIONS  (STANDING):  aspirin enteric coated 81 milliGRAM(s) Oral daily  atorvastatin 80 milliGRAM(s) Oral at bedtime  buMETAnide Infusion 1 mG/Hr (5 mL/Hr) IV Continuous <Continuous>  chlorhexidine 2% Cloths 1 Application(s) Topical daily  dextrose 5%. 1000 milliLiter(s) (100 mL/Hr) IV Continuous <Continuous>  dextrose 5%. 1000 milliLiter(s) (50 mL/Hr) IV Continuous <Continuous>  dextrose 50% Injectable 12.5 Gram(s) IV Push once  dextrose 50% Injectable 25 Gram(s) IV Push once  dextrose 50% Injectable 25 Gram(s) IV Push once  glucagon  Injectable 1 milliGRAM(s) IntraMuscular once  heparin   Injectable 5000 Unit(s) SubCutaneous every 8 hours  hydrALAZINE 37.5 milliGRAM(s) Oral every 8 hours  insulin glargine Injectable (LANTUS) 24 Unit(s) SubCutaneous at bedtime  insulin lispro (ADMELOG) corrective regimen sliding scale   SubCutaneous three times a day before meals  insulin lispro (ADMELOG) corrective regimen sliding scale   SubCutaneous at bedtime  insulin lispro Injectable (ADMELOG) 8 Unit(s) SubCutaneous three times a day before meals  isosorbide   dinitrate Tablet (ISORDIL) 10 milliGRAM(s) Oral three times a day  pantoprazole    Tablet 40 milliGRAM(s) Oral before breakfast  senna 2 Tablet(s) Oral at bedtime  tamsulosin 0.4 milliGRAM(s) Oral at bedtime    MEDICATIONS  (PRN):  acetaminophen     Tablet .. 650 milliGRAM(s) Oral every 6 hours PRN Temp greater or equal to 38C (100.4F), Mild Pain (1 - 3)  albuterol    90 MICROgram(s) HFA Inhaler 2 Puff(s) Inhalation every 6 hours PRN Shortness of Breath and/or Wheezing  benzocaine/menthol Lozenge 1 Lozenge Oral every 2 hours PRN Sore Throat  dextrose Oral Gel 15 Gram(s) Oral once PRN Blood Glucose LESS THAN 70 milliGRAM(s)/deciliter  guaiFENesin Oral Liquid (Sugar-Free) 100 milliGRAM(s) Oral every 6 hours PRN Cough  polyethylene glycol 3350 17 Gram(s) Oral daily PRN Constipation

## 2023-10-24 NOTE — DIETITIAN INITIAL EVALUATION ADULT - PROBLEM SELECTOR PLAN 3
Initially found to be hypoxic requiring bipap prn at [facility]. Found to be entero rhinovirus positive.  - Continuous pulse oximetry  - Was receiving 14-day cefepime course at [facility], ends on 10/14/23. Will continue one more day of cefepime to finish course.  - CT scan findings reviewed showing B/L opacities concerning for edema vs infection.  - incentive spirometry as tolerated

## 2023-10-24 NOTE — DIETITIAN INITIAL EVALUATION ADULT - PERSON TAUGHT/METHOD
Discussed Na restriction, foods high in Na to avoid, reading food labels, tips for limiting Na in your diet. Reviewed daily weights, Wt gain parameters to contact MD. Discussed Na intake in relation to fluid retention, edema and Wt gain.   Provided education on Carbohydrate Consistent diet including sources of carbohydrates, portion sizes, pairing protein with carbohydrates, limiting sugar sweetened beverages in diet and the importance of consistent eating pattern to help optimize glycemic control.   Pt verbalized understanding and accepted Heart Failure Nutrition Therapy Handout and Diabetes MyPlate Guidelines for portion control./verbal instruction/written material/patient instructed

## 2023-10-24 NOTE — PROGRESS NOTE ADULT - ASSESSMENT
59 yo Male pmhx CVA with mild left sided deficits, HTN, DM2, vertigo, HLD, Mobitz II s/pp [employer] dual chamber ICD (22) initially presented to [facility] from home with complaints of dyspnea and chest pain and was admitted w/ acute hypoxic respiratory failure likely due to acute pulmonary edema due to acute HFrEF in setting of acute NSTEMI, LAURA and enterovirus infection.    Pt with brief MICU stay at [facility] requiring bipap (no intubation), was treated for PNA with cefepime, and was found to have TTE done 23 showing EF 20% with severely decreased LVSF, multiple regional wall motion abnormalities, and elevated LV end diastolic pressure. Pt was transferred to [facility] for cardiac cath evaluation.     S/p LHC on 10/16 w/ 3v disease and RHC on 10/19 for hemodynamic assessment- showed CVP of 10, PCWP 29 and CI of 2.3, started empirically on  for shortness of breath but repeat RHC with almost identical numbers now. Admitted to CICU for CABG eval vs. advanced therapy vs. high risk PCI. Deemed to be too high risk for CABG and given hydralazine for afterload reduction and diuresis with bumex. Heart Failure team following and currently holding off on advanced therapy discussion.     Pt now transferred to the floor. Pending Heart Failure recs regarding PCI vs further intervention.

## 2023-10-24 NOTE — DIETITIAN INITIAL EVALUATION ADULT - PROBLEM SELECTOR PLAN 1
Presenting initially with chest pain to [facility]. Troponin there went from 6791 --> 41743 --> 19946.  - EKG initially with TWI in lateral leads.   - EKG done 10/13 personally reviewed showing sinus tachycardia, widened QRS, TWI in V1-3, .   - Patient currently without symptoms of chest pain, dyspnea, or diaphoresis  - Continue ASA 81mg and Plavix 75mg qd  - S/p heparin drip x72 hrs while at [facility]  - Cards notified, planning to do cardiac cath.

## 2023-10-24 NOTE — PROGRESS NOTE ADULT - PROBLEM SELECTOR PLAN 1
ECHO 10/16: EF 20%, severely reduced LVSF. HF team following. Discontinued Torpol XL 10/20 and deferring RAASi/MRA/SGLT2i given concerns of cardiogenic shock. On Hydralazine to 37.5mg q8h    On Bumex gtt plan to switch to PO Bumex 2mg PO daily on 10/25.     Per HF team: severe LV dysfunction with tachycardia and poor non-invasive hemodynamics concerning, currently holding off on launching VAD/transplant eval however may need to reassess. Of note he has good support and no clear psychosocial red flags. ABO AB. would need neurologic assessment with prior CVA and ideally improved conditioning given ongoing prolonged hospitalization

## 2023-10-24 NOTE — PROGRESS NOTE ADULT - ASSESSMENT
57 YO M with a history of CVA with residual mild R paresthesias, second degree Mobitz II heart block s/p DC-ICD 12/2022 (initial concern for sarcoid but negative biopsy/PET post procedure), DM2 (A1c 8.4%), and HTN who was admitted to [facility] with chest pain and dyspnea, found to have NSTEMI with markedly elevated troponins and new severe LV dysfunction with regional wall motion abnormalities as well as + enterovirus. He required NIPPV and was diuresed before being transferred to [facility] where LHC performed which revealed severe 3v CAD with critical proximal LAD involvement. CTS was consulted for CABG evaluation and HF consulted for comanagement.    He ultimately underwent RHC with revealed elevated wedge but normal cardiac output. Within the past 24 hours patient has become more tachycardic, is cool on exam and noted to have elevated JVP. He was transferred to CICU for swan placement and closer observation of hemodynamics. At this time patient would be consider poor candidate for CABG however would  benefit from high risk PCI with the potential need for advance therapies      REVIEW OF STUDIES  RHC 10/19: RA 10, RV 47/9/13, Wedge 29, PA 41/26/33, CO/CI 4.4/2.3, PA sat 57.3  EKG: sinus tachycardia, septal q-waves, left axis deviation   TTE 10/16/23: LV 5.5 cm, LVEF 20% with regional WMAs worse in the apex, LVOT VTI 8 cm, normal RV size/function, severe functional MR, small pericardial effusion, estimated RA pressure 8 mmHg   TTE 12/2022: normal LVEF   [facility]: 95% discrete proximal LAD disease and sequential multifocal disease with good distal target, 80-90% proximal LCx disease just prior to marginals, severe multifocal RCA disease with good targets     Hemodynamics:  10/21/23: RA 13 with V-wave 21, PA 50/33, PCW 33, MvO2 68.2, Cris CO/CI 4.4/2.56

## 2023-10-24 NOTE — PROGRESS NOTE ADULT - ATTENDING COMMENTS
57 YO M with a history of CVA with residual mild R paresthesias, second degree Mobitz II heart block s/p DC-ICD 12/2022 (initial concern for sarcoid but negative biopsy/PET post procedure), DM2 (A1c 8.4%), and HTN who was admitted to [facility] with chest pain and dyspnea, found to have NSTEMI with markedly elevated troponins and new severe LV dysfunction with regional wall motion abnormalities as well as + enterovirus. He required NIPPV and was diuresed before being transferred to [facility] where LHC performed which revealed severe 3v CAD with critical proximal LAD involvement. CTS was consulted for CABG evaluation and HF consulted for comanagement.    He had concerning features including resting tachcyardia and poor non-invasive hemodynamics His cMRI also showed mostly nonviable LAD territory. He underwent RHC which showed moderately elevated L > R filing pressures and low-normal cardiac output and was being considered for PCI however he decompensated 10/20 with volume overload and signs of shock after beta blocker was increased for sinus tachycardia. He was transferred to CICU and began to improve. His current hemodynamics suggest volume overload with preserved cardiac output.     His tachycardia remains concerning but has had > 48 hours of preserved CO on RHC off beta blocker and is diuresing well and tolerating afterload reduction so does not imminently need advanced therapies but remains a higher risk patient. Will try to optimize and can consider PCI of RCA/LCx if he improves.     HEMODYNAMICS  10/21: RA 13, PA 50/33, PCWP 33, PA sat 66% with Cris CO/CI 4.4/2.4, BP 96/59 (70) with SVR 1036  10/22: pending for when patient returns to bed     REVIEW OF STUDIES  EKG: sinus tachycardia, septal q-waves, left axis deviation   TTE 10/16/23: LV 5.5 cm, LVEF 20% with regional WMAs worse in the apex, LVOT VTI 8 cm, normal RV size/function, severe functional MR, small pericardial effusion, estimated RA pressure 8 mmHg   TTE 12/2022: normal LVEF   LHC: 95% discrete proximal LAD disease and sequential multifocal disease with good distal target, 80-90% proximal LCx disease just prior to marginals, severe multifocal RCA disease with good targets   cMRI: LVEF 13%, greater than 50% LGE in mid LAD territory     PLAN  # Cardiogenic shock  - Improving, now off inotropes  - PAC removed 10/22    # Acute systolic heart failure  - Etiology: ischemic  - cont hold BB given Low flow and requiring Inotropes  - will cont hydral 37.5mg TID  - will add isrdil 10mg tid  - will stop bumex drip, switch to Bumex po tomorrow    - Device: has ICD in place and notably with high burden of RV pacing, will need CRT-D upgrade before d/c    - Advanced therapies: severe LV dysfunction with tachycardia and limited viability in LAD territory  however has had multiple days of preserved cardiac output on RHC. he also may benefit from PCI of LCx/RCA. remains with high risk features and may need VAD/transplant evaluation in the future. ABO AB but appears to have deficits from his CVA.     # Severe functional MR  - needs reassessment after revascularization/GDMT    # Severe 3v CAD  - On ASA/statin, cMRI with no viability in LAD territory.  - Deemed too high risk for CABG  - Possible PCI pending clinical progress.    - will discuss options with Evelyne.     # LAURA  - improving  - continue to monitor with diuresis    will cont follow

## 2023-10-24 NOTE — PROGRESS NOTE ADULT - PROBLEM SELECTOR PLAN 2
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  - currently being evaluated for high risk PCI     > Not able to lay flat, will reassess in the AM - On ASA/statin, being considered for surgery but needs hemodynamics   - s/p cMRI on 10/18 revealed there is greater than 50% thickness subendocardial LGE within the mid anteroseptum, anterior and anterolateral segments and apical segments.  - currently being evaluated for high risk PCI     > Not able to lay flat, will CTM

## 2023-10-24 NOTE — PROGRESS NOTE ADULT - SUBJECTIVE AND OBJECTIVE BOX
Patient seen and examined at bedside.    Overnight Events: ***Incomplete note**        Review of Systems: Negative except as per HPI     Current Meds:  acetaminophen     Tablet .. 650 milliGRAM(s) Oral every 6 hours PRN  albuterol    90 MICROgram(s) HFA Inhaler 2 Puff(s) Inhalation every 6 hours PRN  aspirin enteric coated 81 milliGRAM(s) Oral daily  atorvastatin 80 milliGRAM(s) Oral at bedtime  benzocaine/menthol Lozenge 1 Lozenge Oral every 2 hours PRN  buMETAnide Infusion 1 mG/Hr IV Continuous <Continuous>  chlorhexidine 2% Cloths 1 Application(s) Topical daily  dextrose 5%. 1000 milliLiter(s) IV Continuous <Continuous>  dextrose 5%. 1000 milliLiter(s) IV Continuous <Continuous>  dextrose 50% Injectable 12.5 Gram(s) IV Push once  dextrose 50% Injectable 25 Gram(s) IV Push once  dextrose 50% Injectable 25 Gram(s) IV Push once  dextrose Oral Gel 15 Gram(s) Oral once PRN  glucagon  Injectable 1 milliGRAM(s) IntraMuscular once  guaiFENesin Oral Liquid (Sugar-Free) 100 milliGRAM(s) Oral every 6 hours PRN  heparin   Injectable 5000 Unit(s) SubCutaneous every 8 hours  hydrALAZINE 37.5 milliGRAM(s) Oral every 8 hours  insulin glargine Injectable (LANTUS) 24 Unit(s) SubCutaneous at bedtime  insulin lispro (ADMELOG) corrective regimen sliding scale   SubCutaneous three times a day before meals  insulin lispro (ADMELOG) corrective regimen sliding scale   SubCutaneous at bedtime  insulin lispro Injectable (ADMELOG) 8 Unit(s) SubCutaneous three times a day before meals  isosorbide   dinitrate Tablet (ISORDIL) 10 milliGRAM(s) Oral three times a day  pantoprazole    Tablet 40 milliGRAM(s) Oral before breakfast  polyethylene glycol 3350 17 Gram(s) Oral daily PRN  senna 2 Tablet(s) Oral at bedtime  tamsulosin 0.4 milliGRAM(s) Oral at bedtime      Vitals:  T(F): 98.6 (10-24), Max: 98.6 (10-24)  HR: 124 (10-24) (124 - 132)  BP: 105/77 (10-24) (103/69 - 105/77)  RR: 18 (10-24)  SpO2: 98% (10-24)  I&O's Summary    23 Oct 2023 07:01  -  24 Oct 2023 07:00  --------------------------------------------------------  IN: 355 mL / OUT: 3300 mL / NET: -2945 mL        Physical Exam:  Appearance: No acute distress; well appearing  Eyes: PERRL, EOMI, pink conjunctiva  HEENT: Normal oral mucosa  Cardiovascular: RRR, S1, S2, no murmurs, rubs, or gallops; no edema; no JVD  Respiratory: Clear to auscultation bilaterally  Gastrointestinal: soft, non-tender, non-distended with normal bowel sounds  Musculoskeletal: No clubbing; no joint deformity   Neurologic: Non-focal  Lymphatic: No lymphadenopathy  Psychiatry: AAOx3, mood & affect appropriate  Skin: No rashes, ecchymoses, or cyanosis                          13.0   9.67  )-----------( 170      ( 24 Oct 2023 07:29 )             41.5     10-24    141  |  101  |  42<H>  ----------------------------<  91  3.6   |  26  |  1.78<H>    Ca    9.2      24 Oct 2023 07:29    TPro  6.7  /  Alb  3.2<L>  /  TBili  0.6  /  DBili  x   /  AST  22  /  ALT  35  /  AlkPhos  67  10-24       Patient seen and examined at bedside.    Overnight Events: AMI, notes that he is feeling well today denies FC NV CP SOB however still breathless when speaking. Has not ambulated yet. Explained to patient that HF and interventional need to speak to determine if high risk PCI is appropriate and what back up options are available / if AT eval will be initiated. Pt endorsed understanding and says his wife will be around today if we want to talk as a group.     Review of Systems: Negative except as per HPI     Current Meds:  acetaminophen     Tablet .. 650 milliGRAM(s) Oral every 6 hours PRN  albuterol    90 MICROgram(s) HFA Inhaler 2 Puff(s) Inhalation every 6 hours PRN  aspirin enteric coated 81 milliGRAM(s) Oral daily  atorvastatin 80 milliGRAM(s) Oral at bedtime  benzocaine/menthol Lozenge 1 Lozenge Oral every 2 hours PRN  buMETAnide Infusion 1 mG/Hr IV Continuous <Continuous>  chlorhexidine 2% Cloths 1 Application(s) Topical daily  dextrose 5%. 1000 milliLiter(s) IV Continuous <Continuous>  dextrose 5%. 1000 milliLiter(s) IV Continuous <Continuous>  dextrose 50% Injectable 12.5 Gram(s) IV Push once  dextrose 50% Injectable 25 Gram(s) IV Push once  dextrose 50% Injectable 25 Gram(s) IV Push once  dextrose Oral Gel 15 Gram(s) Oral once PRN  glucagon  Injectable 1 milliGRAM(s) IntraMuscular once  guaiFENesin Oral Liquid (Sugar-Free) 100 milliGRAM(s) Oral every 6 hours PRN  heparin   Injectable 5000 Unit(s) SubCutaneous every 8 hours  hydrALAZINE 37.5 milliGRAM(s) Oral every 8 hours  insulin glargine Injectable (LANTUS) 24 Unit(s) SubCutaneous at bedtime  insulin lispro (ADMELOG) corrective regimen sliding scale   SubCutaneous three times a day before meals  insulin lispro (ADMELOG) corrective regimen sliding scale   SubCutaneous at bedtime  insulin lispro Injectable (ADMELOG) 8 Unit(s) SubCutaneous three times a day before meals  isosorbide   dinitrate Tablet (ISORDIL) 10 milliGRAM(s) Oral three times a day  pantoprazole    Tablet 40 milliGRAM(s) Oral before breakfast  polyethylene glycol 3350 17 Gram(s) Oral daily PRN  senna 2 Tablet(s) Oral at bedtime  tamsulosin 0.4 milliGRAM(s) Oral at bedtime      Vitals:  T(F): 98.6 (10-24), Max: 98.6 (10-24)  HR: 124 (10-24) (124 - 132)  BP: 105/77 (10-24) (103/69 - 105/77)  RR: 18 (10-24)  SpO2: 98% (10-24)  I&O's Summary    23 Oct 2023 07:01  -  24 Oct 2023 07:00  --------------------------------------------------------  IN: 355 mL / OUT: 3300 mL / NET: -2945 mL        Physical Exam:  Appearance: No acute distress; well appearing, breathless when speaking   Eyes: PERRL, EOMI, pink conjunctiva  HEENT: Normal oral mucosa  Cardiovascular: RRR, S1, S2, no murmurs, rubs, or gallops; no edema; JVD to mid neck   Respiratory: Mildly dec breath sounds   Gastrointestinal: soft, non-tender, non-distended with normal bowel sounds  Musculoskeletal: No clubbing; no joint deformity   Neurologic: Non-focal  Lymphatic: No lymphadenopathy  Psychiatry: AAOx3, mood & affect appropriate  Skin: No rashes, ecchymoses, or cyanosis                          13.0   9.67  )-----------( 170      ( 24 Oct 2023 07:29 )             41.5     10-24    141  |  101  |  42<H>  ----------------------------<  91  3.6   |  26  |  1.78<H>    Ca    9.2      24 Oct 2023 07:29    TPro  6.7  /  Alb  3.2<L>  /  TBili  0.6  /  DBili  x   /  AST  22  /  ALT  35  /  AlkPhos  67  10-24

## 2023-10-24 NOTE — DIETITIAN INITIAL EVALUATION ADULT - PHYSCIAL ASSESSMENT
weight loss noted since admission. ? due to differences in scales. RD will continue to monitor trends.

## 2023-10-24 NOTE — PROGRESS NOTE ADULT - PROBLEM SELECTOR PLAN 1
- Etiology: ischemic  - GDMT: discontinued Torpol XL 10/20 and deferring RAASi/MRA/SGLT2i given concerns of cardiogenic shock.   - Increase Hydralazine to 37.5mg q8h, Cont ISDN 10 TID  - Diuretics: Cont Bumex gtt to keep net negative 2L over 24hrs  - Device: has ICD in place and notably with high burden of RV pacing  - Advanced therapies: severe LV dysfunction with tachycardia and poor non-invasive hemodynamics concerning, currently holding off on launching VAD/transplant eval however may need to reassess. Of note he has good support and no clear psychosocial red flags. ABO AB. would need neurologic assessment with prior CVA and ideally improved conditioning given ongoing prolonged hospitalization. - Etiology: ischemic  - GDMT: discontinued Torpol XL 10/20 and deferring RAASi/MRA/SGLT2i given concerns of cardiogenic shock.   - Increase Hydralazine to 37.5mg q8h, Cont ISDN 10 TID  - Diuretics: Please stop bumex gtt and change to 2 PO QDay starting tomorrow 10/25  - Device: has ICD in place and notably with high burden of RV pacing  - Advanced therapies: severe LV dysfunction with tachycardia and poor non-invasive hemodynamics concerning, currently holding off on launching VAD/transplant eval however may need to reassess. Of note he has good support and no clear psychosocial red flags. ABO AB. would need neurologic assessment with prior CVA and ideally improved conditioning given ongoing prolonged hospitalization. - Etiology: ischemic  - GDMT: discontinued Torpol XL 10/20 and deferring RAASi/MRA/SGLT2i given concerns of cardiogenic shock.   - Increase Hydralazine to 37.5mg q8h, Cont ISDN 10 TID  - Diuretics: Please stop bumex gtt and change to 2 PO QDay starting tomorrow 10/25  - Device: has ICD in place and notably with high burden of RV pacing  - Advanced therapies: severe LV dysfunction with tachycardia and poor non-invasive hemodynamics concerning, currently holding off on launching VAD/transplant eval however may need to reassess. Of note he has good support and no clear psychosocial red flags. ABO AB. would need neurologic assessment with prior CVA and ideally improved conditioning given ongoing prolonged hospitalization.  - Please consult PT for evaluation as pt appears severely weakened at this time

## 2023-10-24 NOTE — DIETITIAN INITIAL EVALUATION ADULT - PROBLEM SELECTOR PLAN 4
Scr elevated on presentation at [facility] 1.4, which increased to 1.6. Now currently 1.43  - Monitor I/Os, trend SCr, BMP  - Avoid nephrotoxic medications and renally dose meds  - Will hold off bumex for now

## 2023-10-24 NOTE — DIETITIAN INITIAL EVALUATION ADULT - REASON FOR ADMISSION
57 yo Male pmhx CVA with mild left sided deficits, HTN, DM2, vertigo, HLD, Mobitz II s/pp Medtronic dual chamber ICD (12/21/22) initially presented to [facility] from home with complaints of dyspnea and chest pain and was admitted w/ acute hypoxic respiratory failure likely due to acute pulmonary edema due to acute HFrEF in setting of acute NSTEMI, LAURA and enterovirus infection.

## 2023-10-24 NOTE — DIETITIAN INITIAL EVALUATION ADULT - NS FNS DIET ORDER
Diet, Regular:   Consistent Carbohydrate {No Snacks} (CSTCHO)  DASH/TLC {Sodium & Cholesterol Restricted} (DASH)  Vladimir (10-13-23 @ 21:59) [Active]

## 2023-10-25 LAB
ANION GAP SERPL CALC-SCNC: 13 MMOL/L — SIGNIFICANT CHANGE UP (ref 5–17)
ANION GAP SERPL CALC-SCNC: 13 MMOL/L — SIGNIFICANT CHANGE UP (ref 5–17)
BUN SERPL-MCNC: 45 MG/DL — HIGH (ref 7–23)
BUN SERPL-MCNC: 45 MG/DL — HIGH (ref 7–23)
CALCIUM SERPL-MCNC: 9 MG/DL — SIGNIFICANT CHANGE UP (ref 8.4–10.5)
CALCIUM SERPL-MCNC: 9 MG/DL — SIGNIFICANT CHANGE UP (ref 8.4–10.5)
CHLORIDE SERPL-SCNC: 101 MMOL/L — SIGNIFICANT CHANGE UP (ref 96–108)
CHLORIDE SERPL-SCNC: 101 MMOL/L — SIGNIFICANT CHANGE UP (ref 96–108)
CO2 SERPL-SCNC: 27 MMOL/L — SIGNIFICANT CHANGE UP (ref 22–31)
CO2 SERPL-SCNC: 27 MMOL/L — SIGNIFICANT CHANGE UP (ref 22–31)
CREAT SERPL-MCNC: 1.92 MG/DL — HIGH (ref 0.5–1.3)
CREAT SERPL-MCNC: 1.92 MG/DL — HIGH (ref 0.5–1.3)
EGFR: 40 ML/MIN/1.73M2 — LOW
EGFR: 40 ML/MIN/1.73M2 — LOW
GLUCOSE BLDC GLUCOMTR-MCNC: 118 MG/DL — HIGH (ref 70–99)
GLUCOSE BLDC GLUCOMTR-MCNC: 118 MG/DL — HIGH (ref 70–99)
GLUCOSE BLDC GLUCOMTR-MCNC: 180 MG/DL — HIGH (ref 70–99)
GLUCOSE BLDC GLUCOMTR-MCNC: 180 MG/DL — HIGH (ref 70–99)
GLUCOSE BLDC GLUCOMTR-MCNC: 344 MG/DL — HIGH (ref 70–99)
GLUCOSE BLDC GLUCOMTR-MCNC: 344 MG/DL — HIGH (ref 70–99)
GLUCOSE BLDC GLUCOMTR-MCNC: 80 MG/DL — SIGNIFICANT CHANGE UP (ref 70–99)
GLUCOSE BLDC GLUCOMTR-MCNC: 80 MG/DL — SIGNIFICANT CHANGE UP (ref 70–99)
GLUCOSE SERPL-MCNC: 86 MG/DL — SIGNIFICANT CHANGE UP (ref 70–99)
GLUCOSE SERPL-MCNC: 86 MG/DL — SIGNIFICANT CHANGE UP (ref 70–99)
MAGNESIUM SERPL-MCNC: 2 MG/DL — SIGNIFICANT CHANGE UP (ref 1.6–2.6)
MAGNESIUM SERPL-MCNC: 2 MG/DL — SIGNIFICANT CHANGE UP (ref 1.6–2.6)
PHOSPHATE SERPL-MCNC: 4.4 MG/DL — SIGNIFICANT CHANGE UP (ref 2.5–4.5)
PHOSPHATE SERPL-MCNC: 4.4 MG/DL — SIGNIFICANT CHANGE UP (ref 2.5–4.5)
POTASSIUM SERPL-MCNC: 3.8 MMOL/L — SIGNIFICANT CHANGE UP (ref 3.5–5.3)
POTASSIUM SERPL-MCNC: 3.8 MMOL/L — SIGNIFICANT CHANGE UP (ref 3.5–5.3)
POTASSIUM SERPL-SCNC: 3.8 MMOL/L — SIGNIFICANT CHANGE UP (ref 3.5–5.3)
POTASSIUM SERPL-SCNC: 3.8 MMOL/L — SIGNIFICANT CHANGE UP (ref 3.5–5.3)
SODIUM SERPL-SCNC: 141 MMOL/L — SIGNIFICANT CHANGE UP (ref 135–145)
SODIUM SERPL-SCNC: 141 MMOL/L — SIGNIFICANT CHANGE UP (ref 135–145)

## 2023-10-25 PROCEDURE — 99232 SBSQ HOSP IP/OBS MODERATE 35: CPT

## 2023-10-25 RX ADMIN — HEPARIN SODIUM 5000 UNIT(S): 5000 INJECTION INTRAVENOUS; SUBCUTANEOUS at 22:23

## 2023-10-25 RX ADMIN — Medication 8 UNIT(S): at 11:55

## 2023-10-25 RX ADMIN — ATORVASTATIN CALCIUM 80 MILLIGRAM(S): 80 TABLET, FILM COATED ORAL at 22:23

## 2023-10-25 RX ADMIN — Medication 8 UNIT(S): at 07:48

## 2023-10-25 RX ADMIN — INSULIN GLARGINE 24 UNIT(S): 100 INJECTION, SOLUTION SUBCUTANEOUS at 22:24

## 2023-10-25 RX ADMIN — Medication 4: at 16:50

## 2023-10-25 RX ADMIN — HEPARIN SODIUM 5000 UNIT(S): 5000 INJECTION INTRAVENOUS; SUBCUTANEOUS at 05:51

## 2023-10-25 RX ADMIN — Medication 81 MILLIGRAM(S): at 11:07

## 2023-10-25 RX ADMIN — ISOSORBIDE DINITRATE 10 MILLIGRAM(S): 5 TABLET ORAL at 11:07

## 2023-10-25 RX ADMIN — BUMETANIDE 2 MILLIGRAM(S): 0.25 INJECTION INTRAMUSCULAR; INTRAVENOUS at 05:52

## 2023-10-25 RX ADMIN — Medication 8 UNIT(S): at 16:50

## 2023-10-25 RX ADMIN — Medication 37.5 MILLIGRAM(S): at 22:23

## 2023-10-25 RX ADMIN — Medication 37.5 MILLIGRAM(S): at 05:51

## 2023-10-25 RX ADMIN — PANTOPRAZOLE SODIUM 40 MILLIGRAM(S): 20 TABLET, DELAYED RELEASE ORAL at 05:52

## 2023-10-25 RX ADMIN — ISOSORBIDE DINITRATE 10 MILLIGRAM(S): 5 TABLET ORAL at 16:52

## 2023-10-25 RX ADMIN — SENNA PLUS 2 TABLET(S): 8.6 TABLET ORAL at 22:23

## 2023-10-25 RX ADMIN — Medication 37.5 MILLIGRAM(S): at 13:16

## 2023-10-25 RX ADMIN — TAMSULOSIN HYDROCHLORIDE 0.4 MILLIGRAM(S): 0.4 CAPSULE ORAL at 22:23

## 2023-10-25 RX ADMIN — HEPARIN SODIUM 5000 UNIT(S): 5000 INJECTION INTRAVENOUS; SUBCUTANEOUS at 13:16

## 2023-10-25 RX ADMIN — ISOSORBIDE DINITRATE 10 MILLIGRAM(S): 5 TABLET ORAL at 05:51

## 2023-10-25 NOTE — PHYSICAL THERAPY INITIAL EVALUATION ADULT - ADDITIONAL COMMENTS
Pt. lives in apt. with family, 4 steps to get in.6 steps inside. Patient ambulated without AD independent. pt owns no dme s.

## 2023-10-25 NOTE — PROGRESS NOTE ADULT - PROBLEM SELECTOR PLAN 1
- Etiology: ischemic  - GDMT: discontinued Torpol XL 10/20 and deferring RAASi/MRA/SGLT2i given concerns of cardiogenic shock.   - Cont Hydralazine to 37.5mg q8h, Cont ISDN 10 TID  - Diuretics: Bumex 2 PO QDay   - Device: has ICD in place and notably with high burden of RV pacing  - Advanced therapies: severe LV dysfunction with tachycardia and poor non-invasive hemodynamics concerning, currently holding off on launching VAD/transplant eval however may need to reassess. Of note he has good support and no clear psychosocial red flags. ABO AB. would need neurologic assessment with prior CVA and ideally improved conditioning given ongoing prolonged hospitalization.  - Please consult PT for evaluation as pt appears severely weakened at this time

## 2023-10-25 NOTE — PROGRESS NOTE ADULT - PROBLEM SELECTOR PLAN 6
Possible reflex tachycardiac iso hypoxia +/- NSTEMI +/- ADHF.  Has ICD in place and notably with high burden of RV pacing.

## 2023-10-25 NOTE — PROGRESS NOTE ADULT - SUBJECTIVE AND OBJECTIVE BOX
Patient seen and examined at bedside.    Overnight Events: **incomplete note**    Review of Systems: Negative except as per HPI     Current Meds:  acetaminophen     Tablet .. 650 milliGRAM(s) Oral every 6 hours PRN  albuterol    90 MICROgram(s) HFA Inhaler 2 Puff(s) Inhalation every 6 hours PRN  aspirin enteric coated 81 milliGRAM(s) Oral daily  atorvastatin 80 milliGRAM(s) Oral at bedtime  benzocaine/menthol Lozenge 1 Lozenge Oral every 2 hours PRN  buMETAnide 2 milliGRAM(s) Oral daily  chlorhexidine 2% Cloths 1 Application(s) Topical daily  dextrose 5%. 1000 milliLiter(s) IV Continuous <Continuous>  dextrose 5%. 1000 milliLiter(s) IV Continuous <Continuous>  dextrose 50% Injectable 25 Gram(s) IV Push once  dextrose 50% Injectable 12.5 Gram(s) IV Push once  dextrose 50% Injectable 25 Gram(s) IV Push once  dextrose Oral Gel 15 Gram(s) Oral once PRN  glucagon  Injectable 1 milliGRAM(s) IntraMuscular once  guaiFENesin Oral Liquid (Sugar-Free) 100 milliGRAM(s) Oral every 6 hours PRN  heparin   Injectable 5000 Unit(s) SubCutaneous every 8 hours  hydrALAZINE 37.5 milliGRAM(s) Oral every 8 hours  insulin glargine Injectable (LANTUS) 24 Unit(s) SubCutaneous at bedtime  insulin lispro (ADMELOG) corrective regimen sliding scale   SubCutaneous three times a day before meals  insulin lispro (ADMELOG) corrective regimen sliding scale   SubCutaneous at bedtime  insulin lispro Injectable (ADMELOG) 8 Unit(s) SubCutaneous three times a day before meals  isosorbide   dinitrate Tablet (ISORDIL) 10 milliGRAM(s) Oral three times a day  pantoprazole    Tablet 40 milliGRAM(s) Oral before breakfast  polyethylene glycol 3350 17 Gram(s) Oral daily PRN  senna 2 Tablet(s) Oral at bedtime  tamsulosin 0.4 milliGRAM(s) Oral at bedtime      Vitals:  T(F): 97.9 (10-25), Max: 97.9 (10-25)  HR: 127 (10-25) (127 - 131)  BP: 101/71 (10-25) (91/59 - 110/77)  RR: 18 (10-25)  SpO2: 94% (10-25)  I&O's Summary    24 Oct 2023 07:01  -  25 Oct 2023 07:00  --------------------------------------------------------  IN: 1070 mL / OUT: 1970 mL / NET: -900 mL        Physical Exam:  Appearance: No acute distress; well appearing, breathless when speaking   Eyes: PERRL, EOMI, pink conjunctiva  HEENT: Normal oral mucosa  Cardiovascular: RRR, S1, S2, no murmurs, rubs, or gallops; no edema; JVD to mid neck   Respiratory: Mildly dec breath sounds   Gastrointestinal: soft, non-tender, non-distended with normal bowel sounds  Musculoskeletal: No clubbing; no joint deformity   Neurologic: Non-focal  Lymphatic: No lymphadenopathy  Psychiatry: AAOx3, mood & affect appropriate  Skin: No rashes, ecchymoses, or cyanosis                        13.0   9.67  )-----------( 170      ( 24 Oct 2023 07:29 )             41.5     10-25    141  |  101  |  45<H>  ----------------------------<  86  3.8   |  27  |  1.92<H>    Ca    9.0      25 Oct 2023 05:49  Phos  4.4     10-25  Mg     2.0     10-25    TPro  6.7  /  Alb  3.2<L>  /  TBili  0.6  /  DBili  x   /  AST  22  /  ALT  35  /  AlkPhos  67  10-24       Patient seen and examined at bedside.    Overnight Events: NAEON, CT chest grossly WNL. States that he is feeling improved today, breathing better, still has not ambulated. Spoke to CTS Dr. Rand about procedural back up, recommend limited AT eval to determine options should the patient decompensate and require mechanical support. Pt endorses good exercise tolerance at baseline, able to walk 15-20 min without stopping. 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     Review of Systems: Negative except as per HPI     Current Meds:  acetaminophen     Tablet .. 650 milliGRAM(s) Oral every 6 hours PRN  albuterol    90 MICROgram(s) HFA Inhaler 2 Puff(s) Inhalation every 6 hours PRN  aspirin enteric coated 81 milliGRAM(s) Oral daily  atorvastatin 80 milliGRAM(s) Oral at bedtime  benzocaine/menthol Lozenge 1 Lozenge Oral every 2 hours PRN  buMETAnide 2 milliGRAM(s) Oral daily  chlorhexidine 2% Cloths 1 Application(s) Topical daily  dextrose 5%. 1000 milliLiter(s) IV Continuous <Continuous>  dextrose 5%. 1000 milliLiter(s) IV Continuous <Continuous>  dextrose 50% Injectable 25 Gram(s) IV Push once  dextrose 50% Injectable 12.5 Gram(s) IV Push once  dextrose 50% Injectable 25 Gram(s) IV Push once  dextrose Oral Gel 15 Gram(s) Oral once PRN  glucagon  Injectable 1 milliGRAM(s) IntraMuscular once  guaiFENesin Oral Liquid (Sugar-Free) 100 milliGRAM(s) Oral every 6 hours PRN  heparin   Injectable 5000 Unit(s) SubCutaneous every 8 hours  hydrALAZINE 37.5 milliGRAM(s) Oral every 8 hours  insulin glargine Injectable (LANTUS) 24 Unit(s) SubCutaneous at bedtime  insulin lispro (ADMELOG) corrective regimen sliding scale   SubCutaneous three times a day before meals  insulin lispro (ADMELOG) corrective regimen sliding scale   SubCutaneous at bedtime  insulin lispro Injectable (ADMELOG) 8 Unit(s) SubCutaneous three times a day before meals  isosorbide   dinitrate Tablet (ISORDIL) 10 milliGRAM(s) Oral three times a day  pantoprazole    Tablet 40 milliGRAM(s) Oral before breakfast  polyethylene glycol 3350 17 Gram(s) Oral daily PRN  senna 2 Tablet(s) Oral at bedtime  tamsulosin 0.4 milliGRAM(s) Oral at bedtime      Vitals:  T(F): 97.9 (10-25), Max: 97.9 (10-25)  HR: 127 (10-25) (127 - 131)  BP: 101/71 (10-25) (91/59 - 110/77)  RR: 18 (10-25)  SpO2: 94% (10-25)  I&O's Summary    24 Oct 2023 07:01  -  25 Oct 2023 07:00  --------------------------------------------------------  IN: 1070 mL / OUT: 1970 mL / NET: -900 mL        Physical Exam:  Appearance: No acute distress; well appearing, breathless when speaking   Eyes: PERRL, EOMI, pink conjunctiva  HEENT: Normal oral mucosa  Cardiovascular: RRR, S1, S2, no murmurs, rubs, or gallops; no edema; JVD to mid neck   Respiratory: Mildly dec breath sounds   Gastrointestinal: soft, non-tender, non-distended with normal bowel sounds  Musculoskeletal: No clubbing; no joint deformity   Neurologic: Non-focal  Lymphatic: No lymphadenopathy  Psychiatry: Mildly confused, AAO x2-3, no FND   Skin: No rashes, ecchymoses, or cyanosis                        13.0   9.67  )-----------( 170      ( 24 Oct 2023 07:29 )             41.5     10-25    141  |  101  |  45<H>  ----------------------------<  86  3.8   |  27  |  1.92<H>    Ca    9.0      25 Oct 2023 05:49  Phos  4.4     10-25  Mg     2.0     10-25    TPro  6.7  /  Alb  3.2<L>  /  TBili  0.6  /  DBili  x   /  AST  22  /  ALT  35  /  AlkPhos  67  10-24

## 2023-10-25 NOTE — PROGRESS NOTE ADULT - ATTENDING COMMENTS
59 YO M with a history of CVA with residual mild R paresthesias, second degree Mobitz II heart block s/p DC-ICD 12/2022 (initial concern for sarcoid but negative biopsy/PET post procedure), DM2 (A1c 8.4%), and HTN who was admitted to [facility] with chest pain and dyspnea, found to have NSTEMI with markedly elevated troponins and new severe LV dysfunction with regional wall motion abnormalities as well as + enterovirus. He required NIPPV and was diuresed before being transferred to [facility] where LHC performed which revealed severe 3v CAD with critical proximal LAD involvement. CTS was consulted for CABG evaluation and HF consulted for comanagement.    He had concerning features including resting tachcyardia and poor non-invasive hemodynamics His cMRI also showed mostly nonviable LAD territory. He underwent RHC which showed moderately elevated L > R filing pressures and low-normal cardiac output and was being considered for PCI however he decompensated 10/20 with volume overload and signs of shock after beta blocker was increased for sinus tachycardia. He was transferred to CICU and began to improve. His current hemodynamics suggest volume overload with preserved cardiac output.     His tachycardia remains concerning but has had > 48 hours of preserved CO on RHC off beta blocker and is diuresing well and tolerating afterload reduction so does not imminently need advanced therapies but remains a higher risk patient. Will try to optimize and can consider PCI of RCA/LCx if he improves.     HEMODYNAMICS  10/21: RA 13, PA 50/33, PCWP 33, PA sat 66% with Cris CO/CI 4.4/2.4, BP 96/59 (70) with SVR 1036  10/22: pending for when patient returns to bed     REVIEW OF STUDIES  EKG: sinus tachycardia, septal q-waves, left axis deviation   TTE 10/16/23: LV 5.5 cm, LVEF 20% with regional WMAs worse in the apex, LVOT VTI 8 cm, normal RV size/function, severe functional MR, small pericardial effusion, estimated RA pressure 8 mmHg   TTE 12/2022: normal LVEF   LHC: 95% discrete proximal LAD disease and sequential multifocal disease with good distal target, 80-90% proximal LCx disease just prior to marginals, severe multifocal RCA disease with good targets   cMRI: LVEF 13%, greater than 50% LGE in mid LAD territory     PLAN  # Cardiogenic shock  - Improving, now off inotropes  - PAC removed 10/22    # Acute systolic heart failure  - Etiology: ischemic  - cont hold BB given Low flow and requiring Inotropes  - will cont hydral 37.5mg TID  - will add isrdil 10mg tid  - will cont po bumex.   - pt eval, OOB to chair  - will discuss with CTS regarding back up for Intervention    - Device: has ICD in place and notably with high burden of RV pacing, will need CRT-D upgrade before d/c    - Advanced therapies: severe LV dysfunction with tachycardia and limited viability in LAD territory  however has had multiple days of preserved cardiac output on RHC. he also may benefit from PCI of LCx/RCA. remains with high risk features and may need VAD/transplant evaluation in the future. ABO AB but appears to have deficits from his CVA.     # Severe functional MR  - needs reassessment after revascularization and GDMT    # Severe 3v CAD  - On ASA/statin, cMRI with no viability in LAD territory.  - Deemed too high risk for CABG  - Possible PCI pending clinical progress.    - will discuss options with Evelyne.     # LAURA  - improving  - continue to monitor with diuresis    will cont follow

## 2023-10-25 NOTE — PROGRESS NOTE ADULT - PROBLEM SELECTOR PLAN 2
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  - currently being evaluated for high risk PCI     > Not able to lay flat, will CTM - On ASA/statin, being considered for surgery but needs hemodynamics   - s/p cMRI on 10/18 revealed there is greater than 50% thickness subendocardial LGE within the mid anteroseptum, anterior and anterolateral segments and apical segments.  - Currently being evaluated for high risk PCI   - Will require AT eval prior to procedure

## 2023-10-25 NOTE — PROGRESS NOTE ADULT - ASSESSMENT
59 yo Male pmhx CVA with mild left sided deficits, HTN, DM2, vertigo, HLD, Mobitz II s/pp Medtronic dual chamber ICD (22) initially presented to [facility] from home with complaints of dyspnea and chest pain and was admitted w/ acute hypoxic respiratory failure likely due to acute pulmonary edema due to acute HFrEF in setting of acute NSTEMI, LAURA and enterovirus infection.    Pt with brief MICU stay at [facility] requiring bipap (no intubation), was treated for PNA with cefepime, and was found to have TTE done 23 showing EF 20% with severely decreased LVSF, multiple regional wall motion abnormalities, and elevated LV end diastolic pressure. Pt was transferred to [facility] for cardiac cath evaluation.     S/p LHC on 10/16 w/ 3v disease and RHC on 10/19 for hemodynamic assessment- showed CVP of 10, PCWP 29 and CI of 2.3, started empirically on  for shortness of breath but repeat RHC with almost identical numbers now. Admitted to CICU for CABG eval vs. advanced therapy vs. high risk PCI. Deemed to be too high risk for CABG and given hydralazine for afterload reduction and diuresis with bumex. Heart Failure team following and currently holding off on advanced therapy discussion.     Pt now transferred to the floor. Pending Heart Failure recs regarding PCI vs further intervention.

## 2023-10-25 NOTE — CHART NOTE - NSCHARTNOTEFT_GEN_A_CORE
To those whom it may concern    Mr. Celeste Carlson is under my care at [facility] in [facility]. Given the level of care that he requires at our institution I am requesting this be considered when reviewing the immigration paperwork and application of . Esther Carlson who is his brother to visit him on compassionate grounds given his ongoing medical conditions.  He is currently undergoing advanced workup at our institution at this time.     Dr. Jaun Junior  Internal Medicine  Phone: [phone]

## 2023-10-25 NOTE — PHYSICAL THERAPY INITIAL EVALUATION ADULT - PERTINENT HX OF CURRENT PROBLEM, REHAB EVAL
57 yo Male pmhx CVA with mild left sided deficits, HTN, DM2, vertigo, HLD, Mobitz II s/pp Medtronic dual chamber ICD (12/21/22) initially presented to [facility] from home with complaints of dyspnea and chest pain and was admitted w/ acute hypoxic respiratory failure likely due to acute pulmonary edema due to acute HFrEF in setting of acute NSTEMI, LAURA and enterovirus infection. Pt with brief MICU stay at [facility] requiring bipap (no intubation), was treated for PNA with cefepime, and was found to have TTE done 9/26/23 showing EF 20% with severely decreased LVSF, multiple regional wall motion abnormalities, and elevated LV end diastolic pressure. Pt was transferred to [facility] for cardiac cath evaluation. LHC performed which revealed severe 3v CAD with critical proximal LAD involvement. CTS was consulted for CABG evaluation and HF consulted for comanagement.HF recommending pre-op RHC 10/18 and if hemodynamics poor, plan for VAD/transplant evaluation, either as backup post CABG or as primary plan . patient transferred to CICU on 10/20 for CABG vs. Advanced therapy vs. high risk PCI evaluation. He was transferred to CICU for swan placement and closer observation of hemodynamics. Per HF note- At this time patient would be consider poor candidate for CABG however would  benefit from high risk PCI with the potential need for advance therapies 59 yo Male pmhx CVA with mild left sided deficits, HTN, DM2, vertigo, HLD, Mobitz II s/pp Medtronic dual chamber ICD (12/21/22) initially presented to [facility] from home with complaints of dyspnea and chest pain and was admitted w/ acute hypoxic respiratory failure likely due to acute pulmonary edema due to acute HFrEF in setting of acute NSTEMI, LAURA and enterovirus infection. Pt with brief MICU stay at [facility] requiring bipap (no intubation), was treated for PNA with cefepime, and was found to have TTE done 9/26/23 showing EF 20% with severely decreased LVSF, multiple regional wall motion abnormalities, and elevated LV end diastolic pressure. Pt was transferred to [facility] for cardiac cath evaluation. LHC performed which revealed severe 3v CAD with critical proximal LAD involvement. CTS was consulted for CABG evaluation and HF consulted for comanagement.HF recommending pre-op RHC 10/18 and if hemodynamics poor, plan for VAD/transplant evaluation, either as backup post CABG or as primary plan . patient transferred to CICU on 10/20 for CABG vs. Advanced therapy vs. high risk PCI evaluation. He was transferred to CICU for swan placement and closer observation of hemodynamics. Per HF note- At this time patient would be consider poor candidate for CABG however would  benefit from high risk PCI with the potential need for advance therapies. CT chest - Degenerative changes of thoracic spine. T9 vertebral body   hemangioma.

## 2023-10-25 NOTE — PHYSICAL THERAPY INITIAL EVALUATION ADULT - NSPTDMEREC_GEN_A_CORE
Rolling Walker.Patient will require RW at home due to dx of HFrEF NSTEMI to help complete mobility related activities for daily living./rolling walker

## 2023-10-25 NOTE — PROGRESS NOTE ADULT - PROBLEM SELECTOR PLAN 1
ECHO 10/16: EF 20%, severely reduced LVSF. HF team following. Discontinued Torpol XL 10/20 and deferring RAASi/MRA/SGLT2i given concerns of cardiogenic shock. On Hydralazine to 37.5mg q8h    On Bumex gtt plan to switch to PO Bumex 2mg PO daily on 10/25.     Per HF team: severe LV dysfunction with tachycardia and poor non-invasive hemodynamics concerning, currently holding off on launching VAD/transplant eval however may need to reassess. Of note he has good support and no clear psychosocial red flags. ABO AB. would need neurologic assessment with prior CVA and ideally improved conditioning given ongoing prolonged hospitalization.

## 2023-10-25 NOTE — PROGRESS NOTE ADULT - PROBLEM SELECTOR PLAN 2
On ASA/statin    S/p cMRI on 10/18 revealed there is greater than 50% thickness subendocardial LGE within the mid anteroseptum, anterior and anterolateral segments and apical segments.    Currently being evaluated for high risk PCI.

## 2023-10-25 NOTE — PROGRESS NOTE ADULT - SUBJECTIVE AND OBJECTIVE BOX
[facility] Division of Hospital Medicine  Jaun Junior DO  Pager (M-F, 8A-5P):  MS Teams PREFERRED        SUBJECTIVE / OVERNIGHT EVENTS:  Patient seen at the bedside, no acute distress.     MEDICATIONS  (STANDING):  aspirin enteric coated 81 milliGRAM(s) Oral daily  atorvastatin 80 milliGRAM(s) Oral at bedtime  buMETAnide 2 milliGRAM(s) Oral daily  chlorhexidine 2% Cloths 1 Application(s) Topical daily  dextrose 5%. 1000 milliLiter(s) (50 mL/Hr) IV Continuous <Continuous>  dextrose 5%. 1000 milliLiter(s) (100 mL/Hr) IV Continuous <Continuous>  dextrose 50% Injectable 25 Gram(s) IV Push once  dextrose 50% Injectable 12.5 Gram(s) IV Push once  dextrose 50% Injectable 25 Gram(s) IV Push once  glucagon  Injectable 1 milliGRAM(s) IntraMuscular once  heparin   Injectable 5000 Unit(s) SubCutaneous every 8 hours  hydrALAZINE 37.5 milliGRAM(s) Oral every 8 hours  insulin glargine Injectable (LANTUS) 24 Unit(s) SubCutaneous at bedtime  insulin lispro (ADMELOG) corrective regimen sliding scale   SubCutaneous three times a day before meals  insulin lispro (ADMELOG) corrective regimen sliding scale   SubCutaneous at bedtime  insulin lispro Injectable (ADMELOG) 8 Unit(s) SubCutaneous three times a day before meals  isosorbide   dinitrate Tablet (ISORDIL) 10 milliGRAM(s) Oral three times a day  pantoprazole    Tablet 40 milliGRAM(s) Oral before breakfast  senna 2 Tablet(s) Oral at bedtime  tamsulosin 0.4 milliGRAM(s) Oral at bedtime    MEDICATIONS  (PRN):  acetaminophen     Tablet .. 650 milliGRAM(s) Oral every 6 hours PRN Temp greater or equal to 38C (100.4F), Mild Pain (1 - 3)  albuterol    90 MICROgram(s) HFA Inhaler 2 Puff(s) Inhalation every 6 hours PRN Shortness of Breath and/or Wheezing  benzocaine/menthol Lozenge 1 Lozenge Oral every 2 hours PRN Sore Throat  dextrose Oral Gel 15 Gram(s) Oral once PRN Blood Glucose LESS THAN 70 milliGRAM(s)/deciliter  guaiFENesin Oral Liquid (Sugar-Free) 100 milliGRAM(s) Oral every 6 hours PRN Cough  polyethylene glycol 3350 17 Gram(s) Oral daily PRN Constipation      I&O's Summary    24 Oct 2023 07:01  -  25 Oct 2023 07:00  --------------------------------------------------------  IN: 1070 mL / OUT: 1970 mL / NET: -900 mL    25 Oct 2023 07:01  -  25 Oct 2023 15:58  --------------------------------------------------------  IN: 680 mL / OUT: 0 mL / NET: 680 mL        PHYSICAL EXAM:  Vital Signs Last 24 Hrs  T(C): 36.6 (25 Oct 2023 12:18), Max: 36.6 (25 Oct 2023 05:20)  T(F): 97.9 (25 Oct 2023 12:18), Max: 97.9 (25 Oct 2023 05:20)  HR: 130 (25 Oct 2023 12:58) (125 - 131)  BP: 105/65 (25 Oct 2023 12:58) (95/60 - 110/77)  BP(mean): 81 (24 Oct 2023 16:48) (81 - 81)  RR: 18 (25 Oct 2023 12:58) (18 - 18)  SpO2: 98% (25 Oct 2023 12:31) (94% - 100%)    Parameters below as of 25 Oct 2023 12:31  Patient On (Oxygen Delivery Method): room air      CONSTITUTIONAL: NAD, well-developed, well-groomed  EYES: PERRLA; conjunctiva and sclera clear  ENMT: Moist oral mucosa, no pharyngeal injection or exudates; normal dentition  NECK: Supple, no palpable masses; no thyromegaly  RESPIRATORY: Decreased breath sounds at bases  CARDIOVASCULAR: Regular rate and rhythm, tachycardia; No lower extremity edema; Peripheral pulses are 2+ bilaterally  ABDOMEN: Nontender to palpation, normoactive bowel sounds, no rebound/guarding; No hepatosplenomegaly  MUSCULOSKELETAL:  No clubbing or cyanosis of digits; no joint swelling or tenderness to palpation  PSYCH: A+O to person, place, and time; affect appropriate      LABS:                        13.0   9.67  )-----------( 170      ( 24 Oct 2023 07:29 )             41.5     10-25    141  |  101  |  45<H>  ----------------------------<  86  3.8   |  27  |  1.92<H>    Ca    9.0      25 Oct 2023 05:49  Phos  4.4     10-25  Mg     2.0     10-25    TPro  6.7  /  Alb  3.2<L>  /  TBili  0.6  /  DBili  x   /  AST  22  /  ALT  35  /  AlkPhos  67  10-24          Urinalysis Basic - ( 25 Oct 2023 05:49 )    Color: x / Appearance: x / SG: x / pH: x  Gluc: 86 mg/dL / Ketone: x  / Bili: x / Urobili: x   Blood: x / Protein: x / Nitrite: x   Leuk Esterase: x / RBC: x / WBC x   Sq Epi: x / Non Sq Epi: x / Bacteria: x          RADIOLOGY & ADDITIONAL TESTS:  Results Reviewed: CBC/CMP personally reviewed by me Hgb 13.0 WBC 9.67 Cr 1.92   Imaging Personally Reviewed:  Electrocardiogram Personally Reviewed:    COORDINATION OF CARE:  Care Discussed with Consultants/Other Providers [Y/N]: Y   Prior or Outpatient Records Reviewed [Y/N]: Y

## 2023-10-25 NOTE — PROGRESS NOTE ADULT - ASSESSMENT
59 YO M with a history of CVA with residual mild R paresthesias, second degree Mobitz II heart block s/p DC-ICD 12/2022 (initial concern for sarcoid but negative biopsy/PET post procedure), DM2 (A1c 8.4%), and HTN who was admitted to [facility] with chest pain and dyspnea, found to have NSTEMI with markedly elevated troponins and new severe LV dysfunction with regional wall motion abnormalities as well as + enterovirus. He required NIPPV and was diuresed before being transferred to [facility] where LHC performed which revealed severe 3v CAD with critical proximal LAD involvement. CTS was consulted for CABG evaluation and HF consulted for comanagement.    He ultimately underwent RHC with revealed elevated wedge but normal cardiac output. Within the past 24 hours patient has become more tachycardic, is cool on exam and noted to have elevated JVP. He was transferred to CICU for swan placement and closer observation of hemodynamics. At this time patient would be consider poor candidate for CABG however would  benefit from high risk PCI with the potential need for advance therapies      REVIEW OF STUDIES  RHC 10/19: RA 10, RV 47/9/13, Wedge 29, PA 41/26/33, CO/CI 4.4/2.3, PA sat 57.3  EKG: sinus tachycardia, septal q-waves, left axis deviation   TTE 10/16/23: LV 5.5 cm, LVEF 20% with regional WMAs worse in the apex, LVOT VTI 8 cm, normal RV size/function, severe functional MR, small pericardial effusion, estimated RA pressure 8 mmHg   TTE 12/2022: normal LVEF   [facility]: 95% discrete proximal LAD disease and sequential multifocal disease with good distal target, 80-90% proximal LCx disease just prior to marginals, severe multifocal RCA disease with good targets     Hemodynamics:  10/21/23: RA 13 with V-wave 21, PA 50/33, PCW 33, MvO2 68.2, Cris CO/CI 4.4/2.56

## 2023-10-26 LAB
ALBUMIN SERPL ELPH-MCNC: 3.3 G/DL — SIGNIFICANT CHANGE UP (ref 3.3–5)
ALBUMIN SERPL ELPH-MCNC: 3.3 G/DL — SIGNIFICANT CHANGE UP (ref 3.3–5)
ALP SERPL-CCNC: 62 U/L — SIGNIFICANT CHANGE UP (ref 40–120)
ALP SERPL-CCNC: 62 U/L — SIGNIFICANT CHANGE UP (ref 40–120)
ALT FLD-CCNC: 33 U/L — SIGNIFICANT CHANGE UP (ref 10–45)
ALT FLD-CCNC: 33 U/L — SIGNIFICANT CHANGE UP (ref 10–45)
ANION GAP SERPL CALC-SCNC: 14 MMOL/L — SIGNIFICANT CHANGE UP (ref 5–17)
ANION GAP SERPL CALC-SCNC: 14 MMOL/L — SIGNIFICANT CHANGE UP (ref 5–17)
AST SERPL-CCNC: 18 U/L — SIGNIFICANT CHANGE UP (ref 10–40)
AST SERPL-CCNC: 18 U/L — SIGNIFICANT CHANGE UP (ref 10–40)
BILIRUB SERPL-MCNC: 0.6 MG/DL — SIGNIFICANT CHANGE UP (ref 0.2–1.2)
BILIRUB SERPL-MCNC: 0.6 MG/DL — SIGNIFICANT CHANGE UP (ref 0.2–1.2)
BUN SERPL-MCNC: 44 MG/DL — HIGH (ref 7–23)
BUN SERPL-MCNC: 44 MG/DL — HIGH (ref 7–23)
CALCIUM SERPL-MCNC: 9 MG/DL — SIGNIFICANT CHANGE UP (ref 8.4–10.5)
CALCIUM SERPL-MCNC: 9 MG/DL — SIGNIFICANT CHANGE UP (ref 8.4–10.5)
CHLORIDE SERPL-SCNC: 102 MMOL/L — SIGNIFICANT CHANGE UP (ref 96–108)
CHLORIDE SERPL-SCNC: 102 MMOL/L — SIGNIFICANT CHANGE UP (ref 96–108)
CO2 SERPL-SCNC: 22 MMOL/L — SIGNIFICANT CHANGE UP (ref 22–31)
CO2 SERPL-SCNC: 22 MMOL/L — SIGNIFICANT CHANGE UP (ref 22–31)
CREAT SERPL-MCNC: 1.8 MG/DL — HIGH (ref 0.5–1.3)
CREAT SERPL-MCNC: 1.8 MG/DL — HIGH (ref 0.5–1.3)
EGFR: 44 ML/MIN/1.73M2 — LOW
EGFR: 44 ML/MIN/1.73M2 — LOW
GLUCOSE BLDC GLUCOMTR-MCNC: 132 MG/DL — HIGH (ref 70–99)
GLUCOSE BLDC GLUCOMTR-MCNC: 132 MG/DL — HIGH (ref 70–99)
GLUCOSE BLDC GLUCOMTR-MCNC: 136 MG/DL — HIGH (ref 70–99)
GLUCOSE BLDC GLUCOMTR-MCNC: 136 MG/DL — HIGH (ref 70–99)
GLUCOSE BLDC GLUCOMTR-MCNC: 142 MG/DL — HIGH (ref 70–99)
GLUCOSE BLDC GLUCOMTR-MCNC: 142 MG/DL — HIGH (ref 70–99)
GLUCOSE BLDC GLUCOMTR-MCNC: 283 MG/DL — HIGH (ref 70–99)
GLUCOSE BLDC GLUCOMTR-MCNC: 283 MG/DL — HIGH (ref 70–99)
GLUCOSE SERPL-MCNC: 146 MG/DL — HIGH (ref 70–99)
GLUCOSE SERPL-MCNC: 146 MG/DL — HIGH (ref 70–99)
HCT VFR BLD CALC: 38.4 % — LOW (ref 39–50)
HCT VFR BLD CALC: 38.4 % — LOW (ref 39–50)
HGB BLD-MCNC: 12.1 G/DL — LOW (ref 13–17)
HGB BLD-MCNC: 12.1 G/DL — LOW (ref 13–17)
MAGNESIUM SERPL-MCNC: 2 MG/DL — SIGNIFICANT CHANGE UP (ref 1.6–2.6)
MAGNESIUM SERPL-MCNC: 2 MG/DL — SIGNIFICANT CHANGE UP (ref 1.6–2.6)
MCHC RBC-ENTMCNC: 25.5 PG — LOW (ref 27–34)
MCHC RBC-ENTMCNC: 25.5 PG — LOW (ref 27–34)
MCHC RBC-ENTMCNC: 31.5 GM/DL — LOW (ref 32–36)
MCHC RBC-ENTMCNC: 31.5 GM/DL — LOW (ref 32–36)
MCV RBC AUTO: 80.8 FL — SIGNIFICANT CHANGE UP (ref 80–100)
MCV RBC AUTO: 80.8 FL — SIGNIFICANT CHANGE UP (ref 80–100)
NRBC # BLD: 0 /100 WBCS — SIGNIFICANT CHANGE UP (ref 0–0)
NRBC # BLD: 0 /100 WBCS — SIGNIFICANT CHANGE UP (ref 0–0)
PLATELET # BLD AUTO: 188 K/UL — SIGNIFICANT CHANGE UP (ref 150–400)
PLATELET # BLD AUTO: 188 K/UL — SIGNIFICANT CHANGE UP (ref 150–400)
POTASSIUM SERPL-MCNC: 3.8 MMOL/L — SIGNIFICANT CHANGE UP (ref 3.5–5.3)
POTASSIUM SERPL-MCNC: 3.8 MMOL/L — SIGNIFICANT CHANGE UP (ref 3.5–5.3)
POTASSIUM SERPL-SCNC: 3.8 MMOL/L — SIGNIFICANT CHANGE UP (ref 3.5–5.3)
POTASSIUM SERPL-SCNC: 3.8 MMOL/L — SIGNIFICANT CHANGE UP (ref 3.5–5.3)
PROT SERPL-MCNC: 6.6 G/DL — SIGNIFICANT CHANGE UP (ref 6–8.3)
PROT SERPL-MCNC: 6.6 G/DL — SIGNIFICANT CHANGE UP (ref 6–8.3)
RBC # BLD: 4.75 M/UL — SIGNIFICANT CHANGE UP (ref 4.2–5.8)
RBC # BLD: 4.75 M/UL — SIGNIFICANT CHANGE UP (ref 4.2–5.8)
RBC # FLD: 15.2 % — HIGH (ref 10.3–14.5)
RBC # FLD: 15.2 % — HIGH (ref 10.3–14.5)
SODIUM SERPL-SCNC: 138 MMOL/L — SIGNIFICANT CHANGE UP (ref 135–145)
SODIUM SERPL-SCNC: 138 MMOL/L — SIGNIFICANT CHANGE UP (ref 135–145)
WBC # BLD: 9.19 K/UL — SIGNIFICANT CHANGE UP (ref 3.8–10.5)
WBC # BLD: 9.19 K/UL — SIGNIFICANT CHANGE UP (ref 3.8–10.5)
WBC # FLD AUTO: 9.19 K/UL — SIGNIFICANT CHANGE UP (ref 3.8–10.5)
WBC # FLD AUTO: 9.19 K/UL — SIGNIFICANT CHANGE UP (ref 3.8–10.5)

## 2023-10-26 PROCEDURE — 99233 SBSQ HOSP IP/OBS HIGH 50: CPT

## 2023-10-26 PROCEDURE — 93321 DOPPLER ECHO F-UP/LMTD STD: CPT | Mod: 26

## 2023-10-26 PROCEDURE — 99232 SBSQ HOSP IP/OBS MODERATE 35: CPT

## 2023-10-26 PROCEDURE — 93308 TTE F-UP OR LMTD: CPT | Mod: 26

## 2023-10-26 RX ORDER — SACUBITRIL AND VALSARTAN 24; 26 MG/1; MG/1
1 TABLET, FILM COATED ORAL
Refills: 0 | Status: DISCONTINUED | OUTPATIENT
Start: 2023-10-26 | End: 2023-10-27

## 2023-10-26 RX ADMIN — ISOSORBIDE DINITRATE 10 MILLIGRAM(S): 5 TABLET ORAL at 11:11

## 2023-10-26 RX ADMIN — HEPARIN SODIUM 5000 UNIT(S): 5000 INJECTION INTRAVENOUS; SUBCUTANEOUS at 22:01

## 2023-10-26 RX ADMIN — INSULIN GLARGINE 24 UNIT(S): 100 INJECTION, SOLUTION SUBCUTANEOUS at 22:01

## 2023-10-26 RX ADMIN — ISOSORBIDE DINITRATE 10 MILLIGRAM(S): 5 TABLET ORAL at 05:29

## 2023-10-26 RX ADMIN — Medication 8 UNIT(S): at 07:57

## 2023-10-26 RX ADMIN — Medication 37.5 MILLIGRAM(S): at 13:44

## 2023-10-26 RX ADMIN — PANTOPRAZOLE SODIUM 40 MILLIGRAM(S): 20 TABLET, DELAYED RELEASE ORAL at 05:29

## 2023-10-26 RX ADMIN — Medication 3: at 17:16

## 2023-10-26 RX ADMIN — TAMSULOSIN HYDROCHLORIDE 0.4 MILLIGRAM(S): 0.4 CAPSULE ORAL at 22:02

## 2023-10-26 RX ADMIN — BUMETANIDE 2 MILLIGRAM(S): 0.25 INJECTION INTRAMUSCULAR; INTRAVENOUS at 05:29

## 2023-10-26 RX ADMIN — ATORVASTATIN CALCIUM 80 MILLIGRAM(S): 80 TABLET, FILM COATED ORAL at 22:03

## 2023-10-26 RX ADMIN — SACUBITRIL AND VALSARTAN 1 TABLET(S): 24; 26 TABLET, FILM COATED ORAL at 17:19

## 2023-10-26 RX ADMIN — HEPARIN SODIUM 5000 UNIT(S): 5000 INJECTION INTRAVENOUS; SUBCUTANEOUS at 13:21

## 2023-10-26 RX ADMIN — Medication 37.5 MILLIGRAM(S): at 05:28

## 2023-10-26 RX ADMIN — HEPARIN SODIUM 5000 UNIT(S): 5000 INJECTION INTRAVENOUS; SUBCUTANEOUS at 05:28

## 2023-10-26 RX ADMIN — Medication 81 MILLIGRAM(S): at 11:15

## 2023-10-26 RX ADMIN — Medication 8 UNIT(S): at 17:15

## 2023-10-26 RX ADMIN — Medication 8 UNIT(S): at 11:53

## 2023-10-26 NOTE — PROGRESS NOTE ADULT - ASSESSMENT
59 yo Male pmhx CVA with mild left sided deficits, HTN, DM2, vertigo, HLD, Mobitz II s/pp Medtronic dual chamber ICD (22) initially presented to [facility] from home with complaints of dyspnea and chest pain and was admitted w/ acute hypoxic respiratory failure likely due to acute pulmonary edema due to acute HFrEF in setting of acute NSTEMI, LAURA and enterovirus infection.    Pt with brief MICU stay at [facility] requiring bipap (no intubation), was treated for PNA with cefepime, and was found to have TTE done 23 showing EF 20% with severely decreased LVSF, multiple regional wall motion abnormalities, and elevated LV end diastolic pressure. Pt was transferred to [facility] for cardiac cath evaluation.     S/p LHC on 10/16 w/ 3v disease and RHC on 10/19 for hemodynamic assessment- showed CVP of 10, PCWP 29 and CI of 2.3, started empirically on  for shortness of breath but repeat RHC with almost identical numbers now. Admitted to CICU for CABG eval vs. advanced therapy vs. high risk PCI. Deemed to be too high risk for CABG and given hydralazine for afterload reduction and diuresis with bumex. Heart Failure team following and currently holding off on advanced therapy discussion.     Pt now transferred to the floor. Pending Heart Failure recs regarding PCI vs further intervention--> Recommend EP for upgrade of device. EP called for consult.

## 2023-10-26 NOTE — PROGRESS NOTE ADULT - PROBLEM SELECTOR PLAN 1
- Etiology: ischemic  - GDMT: discontinued Torpol XL 10/20 and deferring RAASi/MRA/SGLT2i given concerns of cardiogenic shock.   - Cont Hydralazine to 37.5mg q8h, Cont ISDN 10 TID  - Diuretics: Bumex 2 PO QDay   - Device: has ICD in place and notably with high burden of RV pacing  - Advanced therapies: severe LV dysfunction with tachycardia and poor non-invasive hemodynamics concerning, currently holding off on launching VAD/transplant eval however may need to reassess. Of note he has good support and no clear psychosocial red flags. ABO AB. would need neurologic assessment with prior CVA and ideally improved conditioning given ongoing prolonged hospitalization.  - F/U PT recs - Etiology: ischemic  - GDMT: discontinued Torpol XL 10/20 and deferring RAASi/MRA/SGLT2i given concerns of cardiogenic shock.   - Stop Hydral/ISDN, start entresto 24-26 BID   - Diuretics: Bumex 2 PO QDay    > Still has francis, consider TOV  - Device: has ICD in place and notably with high burden of RV pacing  - Advanced therapies: severe LV dysfunction with tachycardia and poor non-invasive hemodynamics concerning, currently holding off on launching VAD/transplant eval however may need to reassess. Of note he has good support and no clear psychosocial red flags. ABO AB. would need neurologic assessment with prior CVA and ideally improved conditioning given ongoing prolonged hospitalization.  - F/U PT recs  - F/U limited TTE for EF and r/o LVT

## 2023-10-26 NOTE — PROGRESS NOTE ADULT - ASSESSMENT
57 YO M with a history of CVA with residual mild R paresthesias, second degree Mobitz II heart block s/p DC-ICD 12/2022 (initial concern for sarcoid but negative biopsy/PET post procedure), DM2 (A1c 8.4%), and HTN who was admitted to [facility] with chest pain and dyspnea, found to have NSTEMI with markedly elevated troponins and new severe LV dysfunction with regional wall motion abnormalities as well as + enterovirus. He required NIPPV and was diuresed before being transferred to [facility] where LHC performed which revealed severe 3v CAD with critical proximal LAD involvement. CTS was consulted for CABG evaluation and HF consulted for comanagement.    He ultimately underwent RHC with revealed elevated wedge but normal cardiac output. Within the past 24 hours patient has become more tachycardic, is cool on exam and noted to have elevated JVP. He was transferred to CICU for swan placement and closer observation of hemodynamics. At this time patient would be consider poor candidate for CABG however would  benefit from high risk PCI with the potential need for advance therapies      REVIEW OF STUDIES  RHC 10/19: RA 10, RV 47/9/13, Wedge 29, PA 41/26/33, CO/CI 4.4/2.3, PA sat 57.3  EKG: sinus tachycardia, septal q-waves, left axis deviation   TTE 10/16/23: LV 5.5 cm, LVEF 20% with regional WMAs worse in the apex, LVOT VTI 8 cm, normal RV size/function, severe functional MR, small pericardial effusion, estimated RA pressure 8 mmHg   TTE 12/2022: normal LVEF   [facility]: 95% discrete proximal LAD disease and sequential multifocal disease with good distal target, 80-90% proximal LCx disease just prior to marginals, severe multifocal RCA disease with good targets     Hemodynamics:  10/21/23: RA 13 with V-wave 21, PA 50/33, PCW 33, MvO2 68.2, Cris CO/CI 4.4/2.56

## 2023-10-26 NOTE — PROGRESS NOTE ADULT - PROBLEM SELECTOR PLAN 1
ECHO 10/16: EF 20%, severely reduced LVSF. HF team following. Discontinued Torpol XL 10/20 and deferring RAASi/MRA/SGLT2i given concerns of cardiogenic shock. On Hydralazine to 37.5mg q8h    On Bumex gtt plan to switch to PO Bumex 2mg PO daily on 10/25.     Per HF team: severe LV dysfunction with tachycardia and poor non-invasive hemodynamics concerning, currently holding off on launching VAD/transplant eval however may need to reassess. Of note he has good support and no clear psychosocial red flags. ABO AB. would need neurologic assessment with prior CVA and ideally improved conditioning given ongoing prolonged hospitalization  EP called for upgrade of device.

## 2023-10-26 NOTE — PROGRESS NOTE ADULT - PROBLEM SELECTOR PLAN 2
On ASA/statin    S/p cMRI on 10/18 revealed there is greater than 50% thickness subendocardial LGE within the mid anteroseptum, anterior and anterolateral segments and apical segments.  Currently being evaluated for high risk PCI

## 2023-10-26 NOTE — PROGRESS NOTE ADULT - PROBLEM SELECTOR PLAN 3
- needs reassessment after revascularization and GDMT - Needs reassessment after revascularization and GDMT

## 2023-10-26 NOTE — PROGRESS NOTE ADULT - SUBJECTIVE AND OBJECTIVE BOX
[facility] Division of Hospital Medicine  Jaun Junior DO  Pager (M-F, 8A-5P):  MS Teams PREFERRED        SUBJECTIVE / OVERNIGHT EVENTS:  Patient seen at the bedside during morning rounds in no acute distress, denies abdominal pain, nausea, vomiting, chest pain, shortness of breath.     MEDICATIONS  (STANDING):  aspirin enteric coated 81 milliGRAM(s) Oral daily  atorvastatin 80 milliGRAM(s) Oral at bedtime  buMETAnide 2 milliGRAM(s) Oral daily  chlorhexidine 2% Cloths 1 Application(s) Topical daily  dextrose 5%. 1000 milliLiter(s) (100 mL/Hr) IV Continuous <Continuous>  dextrose 5%. 1000 milliLiter(s) (50 mL/Hr) IV Continuous <Continuous>  dextrose 50% Injectable 12.5 Gram(s) IV Push once  dextrose 50% Injectable 25 Gram(s) IV Push once  dextrose 50% Injectable 25 Gram(s) IV Push once  glucagon  Injectable 1 milliGRAM(s) IntraMuscular once  heparin   Injectable 5000 Unit(s) SubCutaneous every 8 hours  hydrALAZINE 37.5 milliGRAM(s) Oral every 8 hours  insulin glargine Injectable (LANTUS) 24 Unit(s) SubCutaneous at bedtime  insulin lispro (ADMELOG) corrective regimen sliding scale   SubCutaneous three times a day before meals  insulin lispro (ADMELOG) corrective regimen sliding scale   SubCutaneous at bedtime  insulin lispro Injectable (ADMELOG) 8 Unit(s) SubCutaneous three times a day before meals  isosorbide   dinitrate Tablet (ISORDIL) 10 milliGRAM(s) Oral three times a day  pantoprazole    Tablet 40 milliGRAM(s) Oral before breakfast  senna 2 Tablet(s) Oral at bedtime  tamsulosin 0.4 milliGRAM(s) Oral at bedtime    MEDICATIONS  (PRN):  acetaminophen     Tablet .. 650 milliGRAM(s) Oral every 6 hours PRN Temp greater or equal to 38C (100.4F), Mild Pain (1 - 3)  albuterol    90 MICROgram(s) HFA Inhaler 2 Puff(s) Inhalation every 6 hours PRN Shortness of Breath and/or Wheezing  benzocaine/menthol Lozenge 1 Lozenge Oral every 2 hours PRN Sore Throat  dextrose Oral Gel 15 Gram(s) Oral once PRN Blood Glucose LESS THAN 70 milliGRAM(s)/deciliter  guaiFENesin Oral Liquid (Sugar-Free) 100 milliGRAM(s) Oral every 6 hours PRN Cough  polyethylene glycol 3350 17 Gram(s) Oral daily PRN Constipation      I&O's Summary    25 Oct 2023 07:01  -  26 Oct 2023 07:00  --------------------------------------------------------  IN: 1100 mL / OUT: 875 mL / NET: 225 mL    26 Oct 2023 07:01  -  26 Oct 2023 11:55  --------------------------------------------------------  IN: 240 mL / OUT: 0 mL / NET: 240 mL        PHYSICAL EXAM:  Vital Signs Last 24 Hrs  T(C): 36.9 (26 Oct 2023 11:38), Max: 37.4 (25 Oct 2023 20:06)  T(F): 98.4 (26 Oct 2023 11:38), Max: 99.3 (25 Oct 2023 20:06)  HR: 121 (26 Oct 2023 11:38) (121 - 131)  BP: 94/62 (26 Oct 2023 11:38) (94/62 - 105/70)  BP(mean): 76 (26 Oct 2023 10:11) (76 - 81)  RR: 18 (26 Oct 2023 11:38) (18 - 18)  SpO2: 97% (26 Oct 2023 11:38) (92% - 100%)    Parameters below as of 26 Oct 2023 11:38  Patient On (Oxygen Delivery Method): room air      CONSTITUTIONAL: NAD, well-developed, well-groomed  EYES: PERRLA; conjunctiva and sclera clear  ENMT: Moist oral mucosa, no pharyngeal injection or exudates; normal dentition  NECK: Supple, no palpable masses; no thyromegaly  RESPIRATORY: Decreased breath sounds at bases  CARDIOVASCULAR: Regular rate and rhythm, tachycardia; No lower extremity edema; Peripheral pulses are 2+ bilaterally  ABDOMEN: Nontender to palpation, normoactive bowel sounds, no rebound/guarding; No hepatosplenomegaly  MUSCULOSKELETAL:  No clubbing or cyanosis of digits; no joint swelling or tenderness to palpation      LABS:                        12.1   9.19  )-----------( 188      ( 26 Oct 2023 05:43 )             38.4     10-26    138  |  102  |  44<H>  ----------------------------<  146<H>  3.8   |  22  |  1.80<H>    Ca    9.0      26 Oct 2023 05:43  Phos  4.4     10-25  Mg     2.0     10-26    TPro  6.6  /  Alb  3.3  /  TBili  0.6  /  DBili  x   /  AST  18  /  ALT  33  /  AlkPhos  62  10-26          Urinalysis Basic - ( 26 Oct 2023 05:43 )    Color: x / Appearance: x / SG: x / pH: x  Gluc: 146 mg/dL / Ketone: x  / Bili: x / Urobili: x   Blood: x / Protein: x / Nitrite: x   Leuk Esterase: x / RBC: x / WBC x   Sq Epi: x / Non Sq Epi: x / Bacteria: x          RADIOLOGY & ADDITIONAL TESTS:  Results Reviewed: CBC/CMP personally reviewed by me Hgb 12.1, WBC 9.19 Cr 1.80  Imaging Personally Reviewed:  Electrocardiogram Personally Reviewed:    COORDINATION OF CARE:  Care Discussed with Consultants/Other Providers [Y/N]: Y   Prior or Outpatient Records Reviewed [Y/N]:Y

## 2023-10-26 NOTE — CHART NOTE - NSCHARTNOTEFT_GEN_A_CORE
59 YO M with a history of CVA with residual mild R paresthesias, second degree Mobitz II heart block s/p DC-ICD 12/2022 (initial concern for sarcoid but negative biopsy/PET post procedure), DM2 (A1c 8.4%), and HTN who was admitted to [facility] with chest pain and dyspnea, found to have NSTEMI with markedly elevated troponins and new severe LV dysfunction with regional wall motion abnormalities as well as + Enterovirus. He required NIPPV and was diuresed before being transferred to [facility] where LHC performed which revealed severe 3v CAD with critical proximal LAD involvement. CTS was consulted for CABG evaluation and HF consulted for comanagement.    He ultimately underwent RHC with revealed elevated wedge but normal cardiac output. Within the past 24 hours patient has become more tachycardic, is cool on exam and noted to have elevated JVP. He was transferred to CICU for swan placement and closer observation of hemodynamics. At this time patient would be consider poor candidate for CABG; however, would  benefit from high risk PCI with the potential need for advance therapies. EP consulted for potential upgrade of DC ICD to BiV system.     Device interrogation performed today, patient is currently conducting 1:1 in SR/-120's. Paced/sensed AV delays extended to 250ms and mode switch turned on AAI/DDD. Given patient w/ intact AV conduction and intrinsic QRS w/ RBBB (144ms), he does not meet criteria for BiV upgrade. Rather, will allow intrinsic conduction given current settings.    Discussed with HF team and attending Dr. Foote. 59 YO M with a history of CVA with residual mild R paresthesias, second degree Mobitz II heart block s/p DC-ICD 12/2022 (initial concern for sarcoid but negative biopsy/PET post procedure), DM2 (A1c 8.4%), and HTN who was admitted to [facility] with chest pain and dyspnea, found to have NSTEMI with markedly elevated troponins and new severe LV dysfunction with regional wall motion abnormalities as well as + Enterovirus. He required NIPPV and was diuresed before being transferred to [facility] where LHC performed which revealed severe 3v CAD with critical proximal LAD involvement. CTS was consulted for CABG evaluation and HF consulted for comanagement.    He ultimately underwent RHC with revealed elevated wedge but normal cardiac output. Within the past 24 hours patient has become more tachycardic, is cool on exam and noted to have elevated JVP. He was transferred to CICU for swan placement and closer observation of hemodynamics. At this time patient would be consider poor candidate for CABG; however, would  benefit from high risk PCI with the potential need for advance therapies. EP consulted for potential upgrade of DC ICD to BiV system.     Device interrogation performed today, patient is currently conducting 1:1 in SR/-120's. On 10/18 AV delays were extended to 250ms when patient noted to be conducting in SR. Since then, patient  <0.1%. Today, mode switch turned on AAI/DDD. Given patient w/ intact AV conduction and intrinsic QRS w/ RBBB (144ms), he does not meet criteria for BiV upgrade. Rather, will allow intrinsic conduction given current settings.    Discussed with HF team and attending Dr. Foote. 59 YO M with a history of CVA with residual mild R paresthesias, second degree Mobitz II heart block s/p dual chamber [employer]onic ICD 12/2022 (initial concern for sarcoid but negative biopsy/PET post procedure), DM2 (A1c 8.4%), and HTN who was admitted to [facility] with chest pain and dyspnea, found to have NSTEMI with markedly elevated troponins and new severe LV dysfunction with regional wall motion abnormalities as well as + Enterovirus. He required NIPPV and was diuresed before being transferred to [facility] where LHC performed which revealed severe 3v CAD with critical proximal LAD involvement. CTS was consulted for CABG evaluation and HF consulted for comanagement.    He ultimately underwent a RHC with revealed elevated wedge but normal cardiac output. Within the past 24 hours patient has become more tachycardic, is cool on exam and noted to have elevated JVP. He was transferred to CICU for swan placement and closer observation of hemodynamics. At this time, per CTSx, the patient would be consider poor candidate for CABG; however, would he benefit from high risk PCI with the potential need for advance therapies. EP consulted for potential upgrade of DC ICD to BiV system.     Device interrogation performed today, patient is currently conducting 1:1 in SR/-120's. On 10/18 AV delays were extended to 250ms when patient noted to be conducting in SR. Since then, patient  <0.1%. Today, mode switch turned on AAI <-->DDD (to ensure minimal RV pacing). Given patient w/ intact AV conduction and intrinsic QRS w/ RBBB (144ms), he does not meet criteria for BiV upgrade. Rather, will allow intrinsic conduction given current settings.    Discussed with HF team and attending Dr. Foote.

## 2023-10-26 NOTE — PROGRESS NOTE ADULT - SUBJECTIVE AND OBJECTIVE BOX
Patient seen and examined at bedside.    Overnight Events: **incomplete note**    Review of Systems: Negative except as per HPI     Current Meds:  acetaminophen     Tablet .. 650 milliGRAM(s) Oral every 6 hours PRN  albuterol    90 MICROgram(s) HFA Inhaler 2 Puff(s) Inhalation every 6 hours PRN  aspirin enteric coated 81 milliGRAM(s) Oral daily  atorvastatin 80 milliGRAM(s) Oral at bedtime  benzocaine/menthol Lozenge 1 Lozenge Oral every 2 hours PRN  buMETAnide 2 milliGRAM(s) Oral daily  chlorhexidine 2% Cloths 1 Application(s) Topical daily  dextrose 5%. 1000 milliLiter(s) IV Continuous <Continuous>  dextrose 5%. 1000 milliLiter(s) IV Continuous <Continuous>  dextrose 50% Injectable 25 Gram(s) IV Push once  dextrose 50% Injectable 12.5 Gram(s) IV Push once  dextrose 50% Injectable 25 Gram(s) IV Push once  dextrose Oral Gel 15 Gram(s) Oral once PRN  glucagon  Injectable 1 milliGRAM(s) IntraMuscular once  guaiFENesin Oral Liquid (Sugar-Free) 100 milliGRAM(s) Oral every 6 hours PRN  heparin   Injectable 5000 Unit(s) SubCutaneous every 8 hours  hydrALAZINE 37.5 milliGRAM(s) Oral every 8 hours  insulin glargine Injectable (LANTUS) 24 Unit(s) SubCutaneous at bedtime  insulin lispro (ADMELOG) corrective regimen sliding scale   SubCutaneous at bedtime  insulin lispro (ADMELOG) corrective regimen sliding scale   SubCutaneous three times a day before meals  insulin lispro Injectable (ADMELOG) 8 Unit(s) SubCutaneous three times a day before meals  isosorbide   dinitrate Tablet (ISORDIL) 10 milliGRAM(s) Oral three times a day  pantoprazole    Tablet 40 milliGRAM(s) Oral before breakfast  polyethylene glycol 3350 17 Gram(s) Oral daily PRN  senna 2 Tablet(s) Oral at bedtime  tamsulosin 0.4 milliGRAM(s) Oral at bedtime      Vitals:  T(F): 98.1 (10-26), Max: 99.3 (10-25)  HR: 125 (10-26) (125 - 131)  BP: 99/68 (10-26) (95/60 - 105/70)  RR: 18 (10-26)  SpO2: 92% (10-26)  I&O's Summary    25 Oct 2023 07:01  -  26 Oct 2023 07:00  --------------------------------------------------------  IN: 1100 mL / OUT: 875 mL / NET: 225 mL        Physical Exam:  Appearance: No acute distress; well appearing, breathless when speaking   Eyes: PERRL, EOMI, pink conjunctiva  HEENT: Normal oral mucosa  Cardiovascular: RRR, S1, S2, no murmurs, rubs, or gallops; no edema; JVD to mid neck   Respiratory: Mildly dec breath sounds   Gastrointestinal: soft, non-tender, non-distended with normal bowel sounds  Musculoskeletal: No clubbing; no joint deformity   Neurologic: Non-focal  Lymphatic: No lymphadenopathy  Psychiatry: Mildly confused, AAO x2-3, no FND   Skin: No rashes, ecchymoses, or cyanosis                        12.1   9.19  )-----------( 188      ( 26 Oct 2023 05:43 )             38.4     10-26    138  |  102  |  44<H>  ----------------------------<  146<H>  3.8   |  22  |  1.80<H>    Ca    9.0      26 Oct 2023 05:43  Phos  4.4     10-25  Mg     2.0     10-26    TPro  6.6  /  Alb  3.3  /  TBili  0.6  /  DBili  x   /  AST  18  /  ALT  33  /  AlkPhos  62  10-26     Patient seen and examined at bedside.    Overnight Events: AMI, feeling well today, says he ambulated down the chan with good tolerance. Spoke to Dr. Puja Santoro regarding possible revascularization. Pt is high risk and would likely need MCS support for his intervention. Last TTE showed c/f possible LVT, will re-order today and then talk to pt and his wife about AT eval. Stopping ISDN/Hydral, starting entresto 24-26     Review of Systems: Negative except as per HPI     Current Meds:  acetaminophen     Tablet .. 650 milliGRAM(s) Oral every 6 hours PRN  albuterol    90 MICROgram(s) HFA Inhaler 2 Puff(s) Inhalation every 6 hours PRN  aspirin enteric coated 81 milliGRAM(s) Oral daily  atorvastatin 80 milliGRAM(s) Oral at bedtime  benzocaine/menthol Lozenge 1 Lozenge Oral every 2 hours PRN  buMETAnide 2 milliGRAM(s) Oral daily  chlorhexidine 2% Cloths 1 Application(s) Topical daily  dextrose 5%. 1000 milliLiter(s) IV Continuous <Continuous>  dextrose 5%. 1000 milliLiter(s) IV Continuous <Continuous>  dextrose 50% Injectable 25 Gram(s) IV Push once  dextrose 50% Injectable 12.5 Gram(s) IV Push once  dextrose 50% Injectable 25 Gram(s) IV Push once  dextrose Oral Gel 15 Gram(s) Oral once PRN  glucagon  Injectable 1 milliGRAM(s) IntraMuscular once  guaiFENesin Oral Liquid (Sugar-Free) 100 milliGRAM(s) Oral every 6 hours PRN  heparin   Injectable 5000 Unit(s) SubCutaneous every 8 hours  hydrALAZINE 37.5 milliGRAM(s) Oral every 8 hours  insulin glargine Injectable (LANTUS) 24 Unit(s) SubCutaneous at bedtime  insulin lispro (ADMELOG) corrective regimen sliding scale   SubCutaneous at bedtime  insulin lispro (ADMELOG) corrective regimen sliding scale   SubCutaneous three times a day before meals  insulin lispro Injectable (ADMELOG) 8 Unit(s) SubCutaneous three times a day before meals  isosorbide   dinitrate Tablet (ISORDIL) 10 milliGRAM(s) Oral three times a day  pantoprazole    Tablet 40 milliGRAM(s) Oral before breakfast  polyethylene glycol 3350 17 Gram(s) Oral daily PRN  senna 2 Tablet(s) Oral at bedtime  tamsulosin 0.4 milliGRAM(s) Oral at bedtime      Vitals:  T(F): 98.1 (10-26), Max: 99.3 (10-25)  HR: 125 (10-26) (125 - 131)  BP: 99/68 (10-26) (95/60 - 105/70)  RR: 18 (10-26)  SpO2: 92% (10-26)  I&O's Summary    25 Oct 2023 07:01  -  26 Oct 2023 07:00  --------------------------------------------------------  IN: 1100 mL / OUT: 875 mL / NET: 225 mL        Physical Exam:  Appearance: No acute distress; well appearing, breathless when speaking   Eyes: PERRL, EOMI, pink conjunctiva  HEENT: Normal oral mucosa  Cardiovascular: RRR, S1, S2, no murmurs, rubs, or gallops; no edema; JVD to mid neck   Respiratory: Mild crackles in the bases  Gastrointestinal: soft, non-tender, non-distended with normal bowel sounds  Musculoskeletal: No clubbing; no joint deformity   Neurologic: Non-focal  Lymphatic: No lymphadenopathy  Psychiatry: Mildly confused, AAO x2-3, no FND   Skin: No rashes, ecchymoses, or cyanosis                        12.1   9.19  )-----------( 188      ( 26 Oct 2023 05:43 )             38.4     10-26    138  |  102  |  44<H>  ----------------------------<  146<H>  3.8   |  22  |  1.80<H>    Ca    9.0      26 Oct 2023 05:43  Phos  4.4     10-25  Mg     2.0     10-26    TPro  6.6  /  Alb  3.3  /  TBili  0.6  /  DBili  x   /  AST  18  /  ALT  33  /  AlkPhos  62  10-26

## 2023-10-26 NOTE — PROGRESS NOTE ADULT - PROBLEM SELECTOR PLAN 2
- On ASA/statin, cMRI with no viability in LAD territory.  - Deemed too high risk for CABG  - Possible PCI pending clinical progress and CTS back up - On ASA/statin, cMRI with no viability in LAD territory.  - Deemed too high risk for CABG  - Possible PCI pending clinical progress and CTS back up  - F/U limited TTE for EF and r/o LVT

## 2023-10-26 NOTE — PROGRESS NOTE ADULT - PROBLEM SELECTOR PLAN 6
Possible reflex tachycardiac iso hypoxia +/- NSTEMI +/- ADHF.  Has ICD in place and notably with high burden of RV pacing  EP consulted for upgrade of device pending recs.

## 2023-10-26 NOTE — PROGRESS NOTE ADULT - ATTENDING COMMENTS
59 YO M with a history of CVA with residual mild R paresthesias, second degree Mobitz II heart block s/p DC-ICD 12/2022 (initial concern for sarcoid but negative biopsy/PET post procedure), DM2 (A1c 8.4%), and HTN who was admitted to [facility] with chest pain and dyspnea, found to have NSTEMI with markedly elevated troponins and new severe LV dysfunction with regional wall motion abnormalities as well as + enterovirus. He required NIPPV and was diuresed before being transferred to [facility] where LHC performed which revealed severe 3v CAD with critical proximal LAD involvement. CTS was consulted for CABG evaluation and HF consulted for comanagement.    He had concerning features including resting tachcyardia and poor non-invasive hemodynamics His cMRI also showed mostly nonviable LAD territory. He underwent RHC which showed moderately elevated L > R filing pressures and low-normal cardiac output and was being considered for PCI however he decompensated 10/20 with volume overload and signs of shock after beta blocker was increased for sinus tachycardia. He was transferred to CICU and began to improve. His current hemodynamics suggest volume overload with preserved cardiac output.     His tachycardia remains concerning but has had > 48 hours of preserved CO on RHC off beta blocker and is diuresing well and tolerating afterload reduction so does not imminently need advanced therapies but remains a higher risk patient. Will try to optimize and can consider PCI of RCA/LCx if he improves.     HEMODYNAMICS  10/21: RA 13, PA 50/33, PCWP 33, PA sat 66% with Cris CO/CI 4.4/2.4, BP 96/59 (70) with SVR 1036  10/22: pending for when patient returns to bed     REVIEW OF STUDIES  EKG: sinus tachycardia, septal q-waves, left axis deviation   TTE 10/16/23: LV 5.5 cm, LVEF 20% with regional WMAs worse in the apex, LVOT VTI 8 cm, normal RV size/function, severe functional MR, small pericardial effusion, estimated RA pressure 8 mmHg   TTE 12/2022: normal LVEF   LHC: 95% discrete proximal LAD disease and sequential multifocal disease with good distal target, 80-90% proximal LCx disease just prior to marginals, severe multifocal RCA disease with good targets   cMRI: LVEF 13%, greater than 50% LGE in mid LAD territory     PLAN  # Cardiogenic shock  - off inotropes  - PAC removed 10/22    # Acute systolic heart failure  - Etiology: ischemic  - extremely tenus with resting tachycardia 120's  - cont hold BB given recent shock. rpt ECHO with low LVOT VTI and new LV apical thrombus and EF < 20%, LVEDD 5.7cms  -switched to entresto yesterday 24-26m. got one dose, d/w team for holding SBO < 90  -hydral/ isordil dicontinued  - will cont po bumex.   - will consider digoxin for sinus tachy and severe CM  -will consider MRA/SGLT2i next  - pt eval, OOB to chair  - will discuss with CTS regarding back up for Intervention    - Device: s/p DC ICD for HB and low EF, with RV pacing 91% on presentation, with increasing AV delay now has native conduction with RBBB QRS and < 1% V pacing burden, so no need for CRT-D upgrade.    - Advanced therapies: severe LV dysfunction with tachycardia and limited viability in LAD territory  however has had multiple days of preserved cardiac output on RHC. he also may benefit from PCI of LCx/RCA. remains with high risk features and discussed briefly in AT eval meeting and may need limited eval to r/o any obsolute CI. But is mental status and understanding of his disease is limited. ? poor health literacy vs cognitive impairment from CVA.   will get Neuro eval and CTH and CTA.   Will get Neuro cog eval    # Severe functional MR  - needs reassessment after revascularization and GDMT    # Severe 3v CAD  - On ASA/statin, cMRI with no viability in LAD territory.  - Deemed too high risk for CABG  - Possible PCI pending plan as above.    #LV apical thrombus  Will cont heparin drip  Bridge with Coumadin/ NOAC.     # LAURA  - improving  - continue to monitor with diuresis    after extensive discussion with patient and wife both yesterday and today both in english and in Wolof with , unable determine if pt understands the extend of his illness. he does understand any procedure is associated with considerable risk and would want try medical management knowing its limitation given his advanced heart disease.   Wife was at bedside, involved in discussion and her sister was on the phone. All the questions and concerns addressed.

## 2023-10-27 DIAGNOSIS — I51.3 INTRACARDIAC THROMBOSIS, NOT ELSEWHERE CLASSIFIED: ICD-10-CM

## 2023-10-27 LAB
ALBUMIN SERPL ELPH-MCNC: 3 G/DL — LOW (ref 3.3–5)
ALBUMIN SERPL ELPH-MCNC: 3 G/DL — LOW (ref 3.3–5)
ALP SERPL-CCNC: 63 U/L — SIGNIFICANT CHANGE UP (ref 40–120)
ALP SERPL-CCNC: 63 U/L — SIGNIFICANT CHANGE UP (ref 40–120)
ALT FLD-CCNC: 31 U/L — SIGNIFICANT CHANGE UP (ref 10–45)
ALT FLD-CCNC: 31 U/L — SIGNIFICANT CHANGE UP (ref 10–45)
ANION GAP SERPL CALC-SCNC: 15 MMOL/L — SIGNIFICANT CHANGE UP (ref 5–17)
ANION GAP SERPL CALC-SCNC: 15 MMOL/L — SIGNIFICANT CHANGE UP (ref 5–17)
APTT BLD: 120.9 SEC — CRITICAL HIGH (ref 24.5–35.6)
APTT BLD: 120.9 SEC — CRITICAL HIGH (ref 24.5–35.6)
AST SERPL-CCNC: 19 U/L — SIGNIFICANT CHANGE UP (ref 10–40)
AST SERPL-CCNC: 19 U/L — SIGNIFICANT CHANGE UP (ref 10–40)
BILIRUB SERPL-MCNC: 0.5 MG/DL — SIGNIFICANT CHANGE UP (ref 0.2–1.2)
BILIRUB SERPL-MCNC: 0.5 MG/DL — SIGNIFICANT CHANGE UP (ref 0.2–1.2)
BUN SERPL-MCNC: 41 MG/DL — HIGH (ref 7–23)
BUN SERPL-MCNC: 41 MG/DL — HIGH (ref 7–23)
CALCIUM SERPL-MCNC: 9.2 MG/DL — SIGNIFICANT CHANGE UP (ref 8.4–10.5)
CALCIUM SERPL-MCNC: 9.2 MG/DL — SIGNIFICANT CHANGE UP (ref 8.4–10.5)
CHLORIDE SERPL-SCNC: 103 MMOL/L — SIGNIFICANT CHANGE UP (ref 96–108)
CHLORIDE SERPL-SCNC: 103 MMOL/L — SIGNIFICANT CHANGE UP (ref 96–108)
CO2 SERPL-SCNC: 20 MMOL/L — LOW (ref 22–31)
CO2 SERPL-SCNC: 20 MMOL/L — LOW (ref 22–31)
CREAT SERPL-MCNC: 1.69 MG/DL — HIGH (ref 0.5–1.3)
CREAT SERPL-MCNC: 1.69 MG/DL — HIGH (ref 0.5–1.3)
EGFR: 47 ML/MIN/1.73M2 — LOW
EGFR: 47 ML/MIN/1.73M2 — LOW
GLUCOSE BLDC GLUCOMTR-MCNC: 165 MG/DL — HIGH (ref 70–99)
GLUCOSE BLDC GLUCOMTR-MCNC: 165 MG/DL — HIGH (ref 70–99)
GLUCOSE BLDC GLUCOMTR-MCNC: 192 MG/DL — HIGH (ref 70–99)
GLUCOSE BLDC GLUCOMTR-MCNC: 192 MG/DL — HIGH (ref 70–99)
GLUCOSE BLDC GLUCOMTR-MCNC: 211 MG/DL — HIGH (ref 70–99)
GLUCOSE BLDC GLUCOMTR-MCNC: 211 MG/DL — HIGH (ref 70–99)
GLUCOSE BLDC GLUCOMTR-MCNC: 287 MG/DL — HIGH (ref 70–99)
GLUCOSE BLDC GLUCOMTR-MCNC: 287 MG/DL — HIGH (ref 70–99)
GLUCOSE SERPL-MCNC: 133 MG/DL — HIGH (ref 70–99)
GLUCOSE SERPL-MCNC: 133 MG/DL — HIGH (ref 70–99)
HCT VFR BLD CALC: 37.6 % — LOW (ref 39–50)
HCT VFR BLD CALC: 37.6 % — LOW (ref 39–50)
HCT VFR BLD CALC: 38.7 % — LOW (ref 39–50)
HCT VFR BLD CALC: 38.7 % — LOW (ref 39–50)
HGB BLD-MCNC: 11.7 G/DL — LOW (ref 13–17)
HGB BLD-MCNC: 11.7 G/DL — LOW (ref 13–17)
HGB BLD-MCNC: 12 G/DL — LOW (ref 13–17)
HGB BLD-MCNC: 12 G/DL — LOW (ref 13–17)
MCHC RBC-ENTMCNC: 25.3 PG — LOW (ref 27–34)
MCHC RBC-ENTMCNC: 25.3 PG — LOW (ref 27–34)
MCHC RBC-ENTMCNC: 25.5 PG — LOW (ref 27–34)
MCHC RBC-ENTMCNC: 25.5 PG — LOW (ref 27–34)
MCHC RBC-ENTMCNC: 31 GM/DL — LOW (ref 32–36)
MCHC RBC-ENTMCNC: 31 GM/DL — LOW (ref 32–36)
MCHC RBC-ENTMCNC: 31.1 GM/DL — LOW (ref 32–36)
MCHC RBC-ENTMCNC: 31.1 GM/DL — LOW (ref 32–36)
MCV RBC AUTO: 81.4 FL — SIGNIFICANT CHANGE UP (ref 80–100)
MCV RBC AUTO: 81.4 FL — SIGNIFICANT CHANGE UP (ref 80–100)
MCV RBC AUTO: 82.3 FL — SIGNIFICANT CHANGE UP (ref 80–100)
MCV RBC AUTO: 82.3 FL — SIGNIFICANT CHANGE UP (ref 80–100)
NRBC # BLD: 0 /100 WBCS — SIGNIFICANT CHANGE UP (ref 0–0)
PLATELET # BLD AUTO: 201 K/UL — SIGNIFICANT CHANGE UP (ref 150–400)
PLATELET # BLD AUTO: 201 K/UL — SIGNIFICANT CHANGE UP (ref 150–400)
PLATELET # BLD AUTO: 230 K/UL — SIGNIFICANT CHANGE UP (ref 150–400)
PLATELET # BLD AUTO: 230 K/UL — SIGNIFICANT CHANGE UP (ref 150–400)
POTASSIUM SERPL-MCNC: 3.9 MMOL/L — SIGNIFICANT CHANGE UP (ref 3.5–5.3)
POTASSIUM SERPL-MCNC: 3.9 MMOL/L — SIGNIFICANT CHANGE UP (ref 3.5–5.3)
POTASSIUM SERPL-SCNC: 3.9 MMOL/L — SIGNIFICANT CHANGE UP (ref 3.5–5.3)
POTASSIUM SERPL-SCNC: 3.9 MMOL/L — SIGNIFICANT CHANGE UP (ref 3.5–5.3)
PROT SERPL-MCNC: 6.5 G/DL — SIGNIFICANT CHANGE UP (ref 6–8.3)
PROT SERPL-MCNC: 6.5 G/DL — SIGNIFICANT CHANGE UP (ref 6–8.3)
RBC # BLD: 4.62 M/UL — SIGNIFICANT CHANGE UP (ref 4.2–5.8)
RBC # BLD: 4.62 M/UL — SIGNIFICANT CHANGE UP (ref 4.2–5.8)
RBC # BLD: 4.7 M/UL — SIGNIFICANT CHANGE UP (ref 4.2–5.8)
RBC # BLD: 4.7 M/UL — SIGNIFICANT CHANGE UP (ref 4.2–5.8)
RBC # FLD: 15.4 % — HIGH (ref 10.3–14.5)
SODIUM SERPL-SCNC: 138 MMOL/L — SIGNIFICANT CHANGE UP (ref 135–145)
SODIUM SERPL-SCNC: 138 MMOL/L — SIGNIFICANT CHANGE UP (ref 135–145)
WBC # BLD: 10.3 K/UL — SIGNIFICANT CHANGE UP (ref 3.8–10.5)
WBC # BLD: 10.3 K/UL — SIGNIFICANT CHANGE UP (ref 3.8–10.5)
WBC # BLD: 8.75 K/UL — SIGNIFICANT CHANGE UP (ref 3.8–10.5)
WBC # BLD: 8.75 K/UL — SIGNIFICANT CHANGE UP (ref 3.8–10.5)
WBC # FLD AUTO: 10.3 K/UL — SIGNIFICANT CHANGE UP (ref 3.8–10.5)
WBC # FLD AUTO: 10.3 K/UL — SIGNIFICANT CHANGE UP (ref 3.8–10.5)
WBC # FLD AUTO: 8.75 K/UL — SIGNIFICANT CHANGE UP (ref 3.8–10.5)
WBC # FLD AUTO: 8.75 K/UL — SIGNIFICANT CHANGE UP (ref 3.8–10.5)

## 2023-10-27 PROCEDURE — 99233 SBSQ HOSP IP/OBS HIGH 50: CPT

## 2023-10-27 PROCEDURE — 70450 CT HEAD/BRAIN W/O DYE: CPT | Mod: 26

## 2023-10-27 PROCEDURE — 99232 SBSQ HOSP IP/OBS MODERATE 35: CPT

## 2023-10-27 RX ORDER — DAPAGLIFLOZIN 10 MG/1
10 TABLET, FILM COATED ORAL EVERY 24 HOURS
Refills: 0 | Status: DISCONTINUED | OUTPATIENT
Start: 2023-10-27 | End: 2023-10-28

## 2023-10-27 RX ORDER — DIGOXIN 250 MCG
250 TABLET ORAL EVERY 6 HOURS
Refills: 0 | Status: COMPLETED | OUTPATIENT
Start: 2023-10-27 | End: 2023-10-28

## 2023-10-27 RX ORDER — HEPARIN SODIUM 5000 [USP'U]/ML
INJECTION INTRAVENOUS; SUBCUTANEOUS
Qty: 25000 | Refills: 0 | Status: DISCONTINUED | OUTPATIENT
Start: 2023-10-27 | End: 2023-10-29

## 2023-10-27 RX ORDER — SACUBITRIL AND VALSARTAN 24; 26 MG/1; MG/1
1 TABLET, FILM COATED ORAL
Refills: 0 | Status: DISCONTINUED | OUTPATIENT
Start: 2023-10-27 | End: 2023-10-28

## 2023-10-27 RX ORDER — SPIRONOLACTONE 25 MG/1
25 TABLET, FILM COATED ORAL DAILY
Refills: 0 | Status: DISCONTINUED | OUTPATIENT
Start: 2023-10-27 | End: 2023-10-27

## 2023-10-27 RX ORDER — HEPARIN SODIUM 5000 [USP'U]/ML
5500 INJECTION INTRAVENOUS; SUBCUTANEOUS EVERY 6 HOURS
Refills: 0 | Status: DISCONTINUED | OUTPATIENT
Start: 2023-10-27 | End: 2023-10-28

## 2023-10-27 RX ORDER — DIGOXIN 250 MCG
125 TABLET ORAL DAILY
Refills: 0 | Status: DISCONTINUED | OUTPATIENT
Start: 2023-10-29 | End: 2023-10-29

## 2023-10-27 RX ORDER — HEPARIN SODIUM 5000 [USP'U]/ML
2500 INJECTION INTRAVENOUS; SUBCUTANEOUS EVERY 6 HOURS
Refills: 0 | Status: DISCONTINUED | OUTPATIENT
Start: 2023-10-27 | End: 2023-10-28

## 2023-10-27 RX ADMIN — ATORVASTATIN CALCIUM 80 MILLIGRAM(S): 80 TABLET, FILM COATED ORAL at 22:20

## 2023-10-27 RX ADMIN — HEPARIN SODIUM 5000 UNIT(S): 5000 INJECTION INTRAVENOUS; SUBCUTANEOUS at 05:33

## 2023-10-27 RX ADMIN — Medication 250 MICROGRAM(S): at 23:52

## 2023-10-27 RX ADMIN — Medication 81 MILLIGRAM(S): at 11:43

## 2023-10-27 RX ADMIN — Medication 250 MICROGRAM(S): at 18:26

## 2023-10-27 RX ADMIN — HEPARIN SODIUM 1100 UNIT(S)/HR: 5000 INJECTION INTRAVENOUS; SUBCUTANEOUS at 17:42

## 2023-10-27 RX ADMIN — DAPAGLIFLOZIN 10 MILLIGRAM(S): 10 TABLET, FILM COATED ORAL at 18:26

## 2023-10-27 RX ADMIN — Medication 8 UNIT(S): at 17:16

## 2023-10-27 RX ADMIN — SACUBITRIL AND VALSARTAN 1 TABLET(S): 24; 26 TABLET, FILM COATED ORAL at 18:26

## 2023-10-27 RX ADMIN — Medication 8 UNIT(S): at 11:42

## 2023-10-27 RX ADMIN — Medication 1: at 11:40

## 2023-10-27 RX ADMIN — HEPARIN SODIUM 1200 UNIT(S)/HR: 5000 INJECTION INTRAVENOUS; SUBCUTANEOUS at 09:49

## 2023-10-27 RX ADMIN — BUMETANIDE 2 MILLIGRAM(S): 0.25 INJECTION INTRAMUSCULAR; INTRAVENOUS at 05:34

## 2023-10-27 RX ADMIN — CHLORHEXIDINE GLUCONATE 1 APPLICATION(S): 213 SOLUTION TOPICAL at 07:39

## 2023-10-27 RX ADMIN — Medication 1: at 07:34

## 2023-10-27 RX ADMIN — PANTOPRAZOLE SODIUM 40 MILLIGRAM(S): 20 TABLET, DELAYED RELEASE ORAL at 05:34

## 2023-10-27 RX ADMIN — INSULIN GLARGINE 24 UNIT(S): 100 INJECTION, SOLUTION SUBCUTANEOUS at 22:20

## 2023-10-27 RX ADMIN — TAMSULOSIN HYDROCHLORIDE 0.4 MILLIGRAM(S): 0.4 CAPSULE ORAL at 22:19

## 2023-10-27 RX ADMIN — Medication 3: at 17:17

## 2023-10-27 RX ADMIN — Medication 8 UNIT(S): at 07:33

## 2023-10-27 NOTE — PROGRESS NOTE ADULT - PROBLEM SELECTOR PLAN 1
- Etiology: ischemic  - GDMT: discontinued Torpol XL 10/20 and deferring RAASi/MRA/SGLT2i given concerns of cardiogenic shock.   - Stop Cont entresto 24-26 BID   - Diuretics: Bumex 2 PO QDay    > Still has francis, consider TOV  - Device: has ICD in place and notably with high burden of RV pacing  - Advanced therapies: severe LV dysfunction with tachycardia and poor non-invasive hemodynamics concerning, currently holding off on launching VAD/transplant eval however may need to reassess. Of note he has good support and no clear psychosocial red flags. ABO AB. would need neurologic assessment with prior CVA and ideally improved conditioning given ongoing prolonged hospitalization.  - F/U PT recs  - F/U limited TTE for EF and r/o LVT #HFrEF  - Etiology: ICM  - Last TTE: EF <20% rMWA  - Cath: : 95% discrete proximal LAD disease and sequential multifocal disease with good distal target, 80-90% proximal LCx disease just prior to marginals, severe multifocal RCA disease with good targets   - NYHA: III  - ACC: C  - SCAI: B  - GDMT:    > BB: Holding iso compensatory tachycardia    > ACE/ARB/ARNI: Entresto 24-26 BID    > MRA: Start spironolactone 25 QDay    > SGLT2 I: Start Farxiga 10 QDay   - Diuretics: Bumex 2 QDay  - Volume Status: Mildly hypervolemic   - Advanced therapies: severe LV dysfunction with tachycardia and poor non-invasive hemodynamics concerning, currently holding off on launching VAD/transplant eval however may need to reassess. Of note he has good support and no clear psychosocial red flags. ABO AB. would need neurologic assessment with prior CVA and ideally improved conditioning given ongoing prolonged hospitalization.  - F/U PT recs #HFrEF  - Etiology: ICM  - Last TTE: EF <20% rMWA  - Cath: : 95% discrete proximal LAD disease and sequential multifocal disease with good distal target, 80-90% proximal LCx disease just prior to marginals, severe multifocal RCA disease with good targets   - NYHA: III  - ACC: C  - SCAI: B  - GDMT:    > BB: Holding iso compensatory tachycardia, start dig load 0.25 Q6 x4 dose followed by 0.125 QDay 10/27    > ACE/ARB/ARNI: Entresto 24-26 BID    > MRA: Start spironolactone 25 QDay    > SGLT2 I: Start Farxiga 10 QDay   - Diuretics: Bumex 2 QDay  - Volume Status: Mildly hypervolemic   - Advanced therapies: severe LV dysfunction with tachycardia and poor non-invasive hemodynamics concerning, currently holding off on launching VAD/transplant eval however may need to reassess. Of note he has good support and no clear psychosocial red flags. ABO AB. would need neurologic assessment with prior CVA and ideally improved conditioning given ongoing prolonged hospitalization.  - F/U PT recs

## 2023-10-27 NOTE — PROGRESS NOTE ADULT - PROBLEM SELECTOR PLAN 2
- On ASA/statin, cMRI with no viability in LAD territory.  - Deemed too high risk for CABG  - Possible PCI if an AT candidate and family wants, however now w/ an LV thrombus and limited support options

## 2023-10-27 NOTE — PROGRESS NOTE ADULT - ASSESSMENT
59 YO M with a history of CVA with residual mild R paresthesias, second degree Mobitz II heart block s/p DC-ICD 12/2022 (initial concern for sarcoid but negative biopsy/PET post procedure), DM2 (A1c 8.4%), and HTN who was admitted to [facility] with chest pain and dyspnea, found to have NSTEMI with markedly elevated troponins and new severe LV dysfunction with regional wall motion abnormalities as well as + enterovirus. He required NIPPV and was diuresed before being transferred to [facility] where LHC performed which revealed severe 3v CAD with critical proximal LAD involvement. CTS was consulted for CABG evaluation and HF consulted for comanagement.    He ultimately underwent RHC with revealed elevated wedge but normal cardiac output. Within the past 24 hours patient has become more tachycardic, is cool on exam and noted to have elevated JVP. He was transferred to CICU for swan placement and closer observation of hemodynamics. At this time patient would be consider poor candidate for CABG however would  benefit from high risk PCI with the potential need for advance therapies      REVIEW OF STUDIES  TTE 10/26: EF <20% LVT present, small pericardial effusion w/o tamponade  RHC 10/19: RA 10, RV 47/9/13, Wedge 29, PA 41/26/33, CO/CI 4.4/2.3, PA sat 57.3  EKG: sinus tachycardia, septal q-waves, left axis deviation   TTE 10/16/23: LV 5.5 cm, LVEF 20% with regional WMAs worse in the apex, LVOT VTI 8 cm, normal RV size/function, severe functional MR, small pericardial effusion, estimated RA pressure 8 mmHg   TTE 12/2022: normal LVEF   LHC: 95% discrete proximal LAD disease and sequential multifocal disease with good distal target, 80-90% proximal LCx disease just prior to marginals, severe multifocal RCA disease with good targets     Hemodynamics:  10/21/23: RA 13 with V-wave 21, PA 50/33, PCW 33, MvO2 68.2, Cris CO/CI 4.4/2.56

## 2023-10-27 NOTE — CONSULT NOTE ADULT - SUBJECTIVE AND OBJECTIVE BOX
Neurology Consult    Reason for Consult: Patient is a 58y old  Male who presents with a chief complaint of NSTEMI (27 Oct 2023 08:42)      HPI:  Patient with separate chart from [facility], MRN# [id number]    57 yo Male pmhx CVA with mild left sided deficits, HTN, DM2, vertigo, HLD, Mobitz II s/pp Medtronic dual chamber ICD (12/21/22) initially presented to [facility] from home with complaints of dyspnea and chest pain and was admitted w/ acute hypoxic respiratory failure likely due to acute pulmonary edema due to acute HFrEF in setting of acute NSTEMI, LAURA and enterovirus infection. Pt with brief MICU stay at [facility] requiring bipap (no intubation), was treated for PNA with cefepime, and was found to have TTE done 9/26/23 showing EF 20% with severely decreased LVSF, multiple regional wall motion abnormalities, and elevated LV end diastolic pressure. Pt was transferred to [facility] for cardiac cath evaluation. Patient without any acute complaints, complaining of intermittent palpitations for the last 2 days. No chest pain, SOB, fevers, nausea, vomiting, abdominal pain.  (13 Oct 2023 21:05)       PAST MEDICAL & SURGICAL HISTORY:  CVA (cerebrovascular accident)      T2DM (type 2 diabetes mellitus)      HTN (hypertension)      HLD (hyperlipidemia)      S/P cystoscopy      S/P hernia repair          Allergies: Allergies    No Known Allergies    Intolerances        Social History: Denies toxic habits including tobacco, ETOH or illicit drugs.    Family History: FAMILY HISTORY:  . No family history of strokes    Medications: MEDICATIONS  (STANDING):  aspirin enteric coated 81 milliGRAM(s) Oral daily  atorvastatin 80 milliGRAM(s) Oral at bedtime  buMETAnide 2 milliGRAM(s) Oral daily  chlorhexidine 2% Cloths 1 Application(s) Topical daily  dextrose 5%. 1000 milliLiter(s) (100 mL/Hr) IV Continuous <Continuous>  dextrose 5%. 1000 milliLiter(s) (50 mL/Hr) IV Continuous <Continuous>  dextrose 50% Injectable 12.5 Gram(s) IV Push once  dextrose 50% Injectable 25 Gram(s) IV Push once  dextrose 50% Injectable 25 Gram(s) IV Push once  glucagon  Injectable 1 milliGRAM(s) IntraMuscular once  heparin  Infusion.  Unit(s)/Hr (12 mL/Hr) IV Continuous <Continuous>  insulin glargine Injectable (LANTUS) 24 Unit(s) SubCutaneous at bedtime  insulin lispro (ADMELOG) corrective regimen sliding scale   SubCutaneous three times a day before meals  insulin lispro (ADMELOG) corrective regimen sliding scale   SubCutaneous at bedtime  insulin lispro Injectable (ADMELOG) 8 Unit(s) SubCutaneous three times a day before meals  pantoprazole    Tablet 40 milliGRAM(s) Oral before breakfast  sacubitril 24 mG/valsartan 26 mG 1 Tablet(s) Oral two times a day  senna 2 Tablet(s) Oral at bedtime  tamsulosin 0.4 milliGRAM(s) Oral at bedtime    MEDICATIONS  (PRN):  acetaminophen     Tablet .. 650 milliGRAM(s) Oral every 6 hours PRN Temp greater or equal to 38C (100.4F), Mild Pain (1 - 3)  albuterol    90 MICROgram(s) HFA Inhaler 2 Puff(s) Inhalation every 6 hours PRN Shortness of Breath and/or Wheezing  benzocaine/menthol Lozenge 1 Lozenge Oral every 2 hours PRN Sore Throat  dextrose Oral Gel 15 Gram(s) Oral once PRN Blood Glucose LESS THAN 70 milliGRAM(s)/deciliter  guaiFENesin Oral Liquid (Sugar-Free) 100 milliGRAM(s) Oral every 6 hours PRN Cough  heparin   Injectable 2500 Unit(s) IV Push every 6 hours PRN For aPTT between 40 - 57  heparin   Injectable 5500 Unit(s) IV Push every 6 hours PRN For aPTT less than 40  polyethylene glycol 3350 17 Gram(s) Oral daily PRN Constipation      Review of Systems:  CONSTITUTIONAL:  No weight loss, fever, chills, weakness or fatigue.  HEENT:  Eyes:  No visual loss, blurred vision, double vision or yellow sclera. Ears, Nose, Throat:  No hearing loss, sneezing, congestion, runny nose or sore throat.  SKIN:  No rash or itching.  CARDIOVASCULAR:  No chest pain, chest pressure or chest discomfort. No palpitations or edema.  RESPIRATORY:  No shortness of breath, cough or sputum.  GASTROINTESTINAL:  No anorexia, nausea, vomiting or diarrhea. No abdominal pain or blood.  GENITOURINARY:  No burning on urination or incontinence   NEUROLOGICAL:  No headache, dizziness, syncope, paralysis, ataxia, numbness or tingling in the extremities. No change in bowel or bladder control. no limb weakness. no vision changes.   MUSCULOSKELETAL:  No muscle, back pain, joint pain or stiffness.  HEMATOLOGIC:  No anemia, bleeding or bruising.  LYMPHATICS:  No enlarged nodes. No history of splenectomy.  PSYCHIATRIC:  No history of depression or anxiety.  ENDOCRINOLOGIC:  No reports of sweating, cold or heat intolerance. No polyuria or polydipsia.      Vitals:  Vital Signs Last 24 Hrs  T(C): 36.8 (27 Oct 2023 08:50), Max: 37.1 (26 Oct 2023 20:10)  T(F): 98.2 (27 Oct 2023 08:50), Max: 98.8 (26 Oct 2023 20:10)  HR: 122 (27 Oct 2023 10:00) (83 - 125)  BP: 93/60 (27 Oct 2023 10:00) (88/57 - 108/76)  BP(mean): 75 (26 Oct 2023 20:10) (75 - 75)  RR: 18 (27 Oct 2023 08:50) (18 - 18)  SpO2: 96% (27 Oct 2023 08:50) (93% - 99%)    Parameters below as of 27 Oct 2023 08:50  Patient On (Oxygen Delivery Method): room air        General Exam:   General Appearance: Appropriately dressed and in no acute distress       Head: Normocephalic, atraumatic and no dysmorphic features  Ear, Nose, and Throat: Moist mucous membranes  CVS: S1S2+  Resp: No SOB, no wheeze or rhonchi  GI: soft NT/ND  Extremities: No edema or cyanosis  Skin: No bruises or rashes     Neurological Exam:  Mental Status: Awake, alert and oriented x 3.  Able to follow simple and complex verbal commands. Able to name and repeat. fluent speech. No obvious aphasia or dysarthria noted.   Cranial Nerves: PERRL, EOMI, VFFC, sensation V1-V3 intact,  no obvious facial asymmetry, equal elevation of palate, scm/trap 5/5, tongue is midline on protrusion. no obvious papilledema on fundoscopic exam. hearing is grossly intact.   Motor: Normal bulk, tone and strength throughout. Fine finger movements were intact and symmetric. no tremors or drift noted.    Sensation: Intact to light touch and pinprick throughout. no right/left confusion. no extinction to tactile on DSS. Romberg was negative.   Reflexes: 1+ throughout at biceps, brachioradialis, triceps, patellars and ankles bilaterally and equal. No clonus. R toe and L toe were both downgoing.  Coordination: No dysmetria on FNF or HKS  Gait: Narrow based and steady. Able to tandem. no limitations in gait.     Data/Labs/Imaging which I personally reviewed.     Labs:     CBC Full  -  ( 27 Oct 2023 05:58 )  WBC Count : 8.75 K/uL  RBC Count : 4.62 M/uL  Hemoglobin : 11.7 g/dL  Hematocrit : 37.6 %  Platelet Count - Automated : 201 K/uL  Mean Cell Volume : 81.4 fl  Mean Cell Hemoglobin : 25.3 pg  Mean Cell Hemoglobin Concentration : 31.1 gm/dL  Auto Neutrophil # : x  Auto Lymphocyte # : x  Auto Monocyte # : x  Auto Eosinophil # : x  Auto Basophil # : x  Auto Neutrophil % : x  Auto Lymphocyte % : x  Auto Monocyte % : x  Auto Eosinophil % : x  Auto Basophil % : x    10-27    138  |  103  |  41<H>  ----------------------------<  133<H>  3.9   |  20<L>  |  1.69<H>    Ca    9.2      27 Oct 2023 05:58  Mg     2.0     10-26    TPro  6.5  /  Alb  3.0<L>  /  TBili  0.5  /  DBili  x   /  AST  19  /  ALT  31  /  AlkPhos  63  10-27    LIVER FUNCTIONS - ( 27 Oct 2023 05:58 )  Alb: 3.0 g/dL / Pro: 6.5 g/dL / ALK PHOS: 63 U/L / ALT: 31 U/L / AST: 19 U/L / GGT: x             Urinalysis Basic - ( 27 Oct 2023 05:58 )    Color: x / Appearance: x / SG: x / pH: x  Gluc: 133 mg/dL / Ketone: x  / Bili: x / Urobili: x   Blood: x / Protein: x / Nitrite: x   Leuk Esterase: x / RBC: x / WBC x   Sq Epi: x / Non Sq Epi: x / Bacteria: x

## 2023-10-27 NOTE — PROGRESS NOTE ADULT - ATTENDING COMMENTS
57 YO M with a history of CVA with residual mild R paresthesias, second degree Mobitz II heart block s/p DC-ICD 12/2022 (initial concern for sarcoid but negative biopsy/PET post procedure), DM2 (A1c 8.4%), and HTN who was admitted to [facility] with chest pain and dyspnea, found to have NSTEMI with markedly elevated troponins and new severe LV dysfunction with regional wall motion abnormalities as well as + enterovirus. He required NIPPV and was diuresed before being transferred to [facility] where LHC performed which revealed severe 3v CAD with critical proximal LAD involvement. CTS was consulted for CABG evaluation and HF consulted for comanagement.    He had concerning features including resting tachcyardia and poor non-invasive hemodynamics His cMRI also showed mostly nonviable LAD territory. He underwent RHC which showed moderately elevated L > R filing pressures and low-normal cardiac output and was being considered for PCI however he decompensated 10/20 with volume overload and signs of shock after beta blocker was increased for sinus tachycardia. He was transferred to CICU and began to improve. His current hemodynamics suggest volume overload with preserved cardiac output.     His tachycardia remains concerning but has had > 48 hours of preserved CO on RHC off beta blocker and is diuresing well and tolerating afterload reduction so does not imminently need advanced therapies but remains a higher risk patient. Will try to optimize and can consider PCI of RCA/LCx if he improves.     HEMODYNAMICS  10/21: RA 13, PA 50/33, PCWP 33, PA sat 66% with Cris CO/CI 4.4/2.4, BP 96/59 (70) with SVR 1036  10/22: pending for when patient returns to bed     REVIEW OF STUDIES  EKG: sinus tachycardia, septal q-waves, left axis deviation   TTE 10/16/23: LV 5.5 cm, LVEF 20% with regional WMAs worse in the apex, LVOT VTI 8 cm, normal RV size/function, severe functional MR, small pericardial effusion, estimated RA pressure 8 mmHg   TTE 12/2022: normal LVEF   LHC: 95% discrete proximal LAD disease and sequential multifocal disease with good distal target, 80-90% proximal LCx disease just prior to marginals, severe multifocal RCA disease with good targets   cMRI: LVEF 13%, greater than 50% LGE in mid LAD territory     PLAN  # Cardiogenic shock  - off inotropes  - PAC removed 10/22    # Acute systolic heart failure  - Etiology: ischemic  - extremely tenus with resting tachycardia 120's  - cont hold BB given recent shock. rpt ECHO with low LVOT VTI and new LV apical thrombus and EF < 20%, LVEDD 5.7cms  -switched to entresto yesterday 24-26m. got one dose, d/w team for holding SBO < 90  -hydral/ isordil dicontinued  - will cont po bumex.   - Will add dig 0.25 q6x 4 doses, cont 0.125 daily after. level on monday  - pt eval, OOB to chair  - will discuss with CTS regarding back up for Intervention    - Device: s/p DC ICD for HB and low EF, with RV pacing 91% on presentation, with increasing AV delay now has native conduction with RBBB QRS and < 1% V pacing burden, so no need for CRT-D upgrade.    - Advanced therapies: severe LV dysfunction with tachycardia and limited viability in LAD territory  however has had multiple days of preserved cardiac output on RHC. he also may benefit from PCI of LCx/RCA. remains with high risk features and discussed briefly in AT eval meeting and may need limited eval to r/o any obsolute CI. But is mental status and understanding of his disease is limited. ? poor health literacy vs cognitive impairment from CVA.   will get Neuro eval and CTH and CTA.   Will get Neuro cog eval    # Severe functional MR  - needs reassessment after revascularization and GDMT    # Severe 3v CAD  - On ASA/statin, cMRI with no viability in LAD territory.  - Deemed too high risk for CABG  - Possible PCI pending plan as above.    #LV apical thrombus  Will cont heparin drip  Bridge with Coumadin/ NOAC.     # LAURA  - improving  - continue to monitor with diuresis    after extensive discussion with patient and wife both yesterday and today both in english and in Romanian with , unable determine if pt understands the extend of his illness. he does understand any procedure is associated with considerable risk and would want try medical management knowing its limitation given his advanced heart disease.   Wife was at bedside, involved in discussion and her sister was on the phone. All the questions and concerns addressed.

## 2023-10-27 NOTE — PROGRESS NOTE ADULT - PROBLEM SELECTOR PLAN 3
- Seen on 10/26 limited TTE  - Starting heparin gtt ASAP  - No longer a candidate for impella assisted PCI, will discuss with interventional and surgical teams possibility of other forms of support

## 2023-10-27 NOTE — CONSULT NOTE ADULT - ASSESSMENT
57 yo Male with prior Stroke with mild ?left sided deficits (improved), HTN, DM2, vertigo, HLD, Mobitz II s/pp Medtronic dual chamber ICD (12/21/22) initially presented to [facility] from home with complaints of dyspnea and chest pain and was admitted w/ acute hypoxic respiratory failure likely due to acute pulmonary edema due to acute HFrEF in setting of acute NSTEMI, LAURA and enterovirus infection. Pt with brief MICU stay at [facility] requiring bipap (no intubation), was treated for PNA with cefepime, and was found to have TTE done 9/26/23 showing EF 20% with severely decreased LVSF, multiple regional wall motion abnormalities, and elevated LV end diastolic pressure. Pt was transferred to [facility] for cardiac eval.   TTE EF < 20%  RHC 10/19: RA 10, RV 47/9/13, Wedge 29, PA 41/26/33, CO/CI 4.4/2.3, PA sat 57.3  EKG: sinus tachycardia, septal q-waves, left axis deviation   TTE 10/16/23: LV 5.5 cm, LVEF 20% with regional WMAs worse in the apex, LVOT VTI 8 cm, normal RV size/function, severe functional MR, small pericardial effusion, estimated RA pressure 8 mmHg   TTE 12/2022: normal LVEF   LHC: 95% discrete proximal LAD disease and sequential multifocal disease with good distal target, 80-90% proximal LCx disease just prior to marginals, severe multifocal RCA disease with good targets   A1c 8.4    CD 10/17 neg   NIHSS 1    Impression:   H/o stroke, needs further imaging. stable, chronic     - c/w asa and statin therpay for secondary stroke prevention  - no objection to heparin drip  - called for risk stratification for heart transplant eval, likel;y low risk and no objection but would check imaging first   - check CTH adn CTA H/N at this time    - telemetry  - PT/OT   - check FS, glucose control <180  - GI/DVT ppx  - Counseling on diet, exercise, and medication adherence was done  - Counseling on smoking cessation and alcohol consumption offered when appropriate.  - Pain assessed and judicious use of narcotics when appropriate was discussed.    - Stroke education given when appropriate.  - Importance of fall prevention discussed.   - Differential diagnosis and plan of care discussed with patient and/or family and primary team  - Thank you for allowing me to participate in the care of this patient. Call with questions.   Abelardo Irving MD  Vascular Neurology  Office: [phone]

## 2023-10-27 NOTE — PROGRESS NOTE ADULT - PROBLEM SELECTOR PLAN 6
Possible reflex tachycardiac iso hypoxia +/- NSTEMI +/- ADHF.  Has ICD in place and notably with high burden of RV pacing  EP consulted for upgrade of device pending recs

## 2023-10-27 NOTE — PROGRESS NOTE ADULT - SUBJECTIVE AND OBJECTIVE BOX
Patient seen and examined at bedside.    Overnight Events: TTE completed showing LV EF<20% and confirmed LV thrombus   **INCOMPLETE NOTE**    Review of Systems: Negative except as per HPI     Current Meds:  acetaminophen     Tablet .. 650 milliGRAM(s) Oral every 6 hours PRN  albuterol    90 MICROgram(s) HFA Inhaler 2 Puff(s) Inhalation every 6 hours PRN  aspirin enteric coated 81 milliGRAM(s) Oral daily  atorvastatin 80 milliGRAM(s) Oral at bedtime  benzocaine/menthol Lozenge 1 Lozenge Oral every 2 hours PRN  buMETAnide 2 milliGRAM(s) Oral daily  chlorhexidine 2% Cloths 1 Application(s) Topical daily  dextrose 5%. 1000 milliLiter(s) IV Continuous <Continuous>  dextrose 5%. 1000 milliLiter(s) IV Continuous <Continuous>  dextrose 50% Injectable 12.5 Gram(s) IV Push once  dextrose 50% Injectable 25 Gram(s) IV Push once  dextrose 50% Injectable 25 Gram(s) IV Push once  dextrose Oral Gel 15 Gram(s) Oral once PRN  glucagon  Injectable 1 milliGRAM(s) IntraMuscular once  guaiFENesin Oral Liquid (Sugar-Free) 100 milliGRAM(s) Oral every 6 hours PRN  heparin   Injectable 5000 Unit(s) SubCutaneous every 8 hours  insulin glargine Injectable (LANTUS) 24 Unit(s) SubCutaneous at bedtime  insulin lispro (ADMELOG) corrective regimen sliding scale   SubCutaneous three times a day before meals  insulin lispro (ADMELOG) corrective regimen sliding scale   SubCutaneous at bedtime  insulin lispro Injectable (ADMELOG) 8 Unit(s) SubCutaneous three times a day before meals  pantoprazole    Tablet 40 milliGRAM(s) Oral before breakfast  polyethylene glycol 3350 17 Gram(s) Oral daily PRN  sacubitril 24 mG/valsartan 26 mG 1 Tablet(s) Oral two times a day  senna 2 Tablet(s) Oral at bedtime  tamsulosin 0.4 milliGRAM(s) Oral at bedtime      Vitals:  T(F): 98.6 (10-27), Max: 98.9 (10-26)  HR: 123 (10-27) (83 - 127)  BP: 98/66 (10-27) (94/62 - 108/76)  RR: 18 (10-27)  SpO2: 93% (10-27)  I&O's Summary    26 Oct 2023 07:01  -  27 Oct 2023 07:00  --------------------------------------------------------  IN: 905 mL / OUT: 950 mL / NET: -45 mL        Physical Exam:  Appearance: No acute distress; well appearing, breathless when speaking   Eyes: PERRL, EOMI, pink conjunctiva  HEENT: Normal oral mucosa  Cardiovascular: RRR, S1, S2, no murmurs, rubs, or gallops; no edema; JVD to mid neck   Respiratory: Mild crackles in the bases  Gastrointestinal: soft, non-tender, non-distended with normal bowel sounds  Musculoskeletal: No clubbing; no joint deformity   Neurologic: Non-focal  Lymphatic: No lymphadenopathy  Psychiatry: Mildly confused, AAO x2-3, no FND   Skin: No rashes, ecchymoses, or cyanosis                          11.7   8.75  )-----------( 201      ( 27 Oct 2023 05:58 )             37.6     10-27    138  |  103  |  41<H>  ----------------------------<  133<H>  3.9   |  20<L>  |  1.69<H>    Ca    9.2      27 Oct 2023 05:58  Mg     2.0     10-26    TPro  6.5  /  Alb  3.0<L>  /  TBili  0.5  /  DBili  x   /  AST  19  /  ALT  31  /  AlkPhos  63  10-27                  New ECG(s): Personally reviewed    CONCLUSIONS:      1. Left ventricular systolic function is severely decreased with an ejection fraction visually estimated at <20 %.   2. Systolic function.   3. LV apical thrombus is seen.      Limited study.   4. Findings were discussed with Yenni LAFLEUR on 10/26/2023 at 1.29pm.   5. Small pericardial effusion noted adjacent to the right ventricle with no evidence of hemodynamic compromise.   6. There is a left ventricular thrombus.   7. There is normal LV mass and normal geometry. Patient seen and examined at bedside.    Overnight Events: TTE completed showing LV EF<20% and confirmed LV thrombus. Patient reports that he is feeling well and ambulated some yesterday. I spoke to him about our conversation yesterday and the AT eval, pt unable to reiterate to me what we were discussing. Will attempt discussion with wife at bedside and [place]  today. Also consulting neuro for hx of CVA  **INCOMPLETE NOTE**    Review of Systems: Negative except as per HPI     Current Meds:  acetaminophen     Tablet .. 650 milliGRAM(s) Oral every 6 hours PRN  albuterol    90 MICROgram(s) HFA Inhaler 2 Puff(s) Inhalation every 6 hours PRN  aspirin enteric coated 81 milliGRAM(s) Oral daily  atorvastatin 80 milliGRAM(s) Oral at bedtime  benzocaine/menthol Lozenge 1 Lozenge Oral every 2 hours PRN  buMETAnide 2 milliGRAM(s) Oral daily  chlorhexidine 2% Cloths 1 Application(s) Topical daily  dextrose 5%. 1000 milliLiter(s) IV Continuous <Continuous>  dextrose 5%. 1000 milliLiter(s) IV Continuous <Continuous>  dextrose 50% Injectable 12.5 Gram(s) IV Push once  dextrose 50% Injectable 25 Gram(s) IV Push once  dextrose 50% Injectable 25 Gram(s) IV Push once  dextrose Oral Gel 15 Gram(s) Oral once PRN  glucagon  Injectable 1 milliGRAM(s) IntraMuscular once  guaiFENesin Oral Liquid (Sugar-Free) 100 milliGRAM(s) Oral every 6 hours PRN  heparin   Injectable 5000 Unit(s) SubCutaneous every 8 hours  insulin glargine Injectable (LANTUS) 24 Unit(s) SubCutaneous at bedtime  insulin lispro (ADMELOG) corrective regimen sliding scale   SubCutaneous three times a day before meals  insulin lispro (ADMELOG) corrective regimen sliding scale   SubCutaneous at bedtime  insulin lispro Injectable (ADMELOG) 8 Unit(s) SubCutaneous three times a day before meals  pantoprazole    Tablet 40 milliGRAM(s) Oral before breakfast  polyethylene glycol 3350 17 Gram(s) Oral daily PRN  sacubitril 24 mG/valsartan 26 mG 1 Tablet(s) Oral two times a day  senna 2 Tablet(s) Oral at bedtime  tamsulosin 0.4 milliGRAM(s) Oral at bedtime      Vitals:  T(F): 98.6 (10-27), Max: 98.9 (10-26)  HR: 123 (10-27) (83 - 127)  BP: 98/66 (10-27) (94/62 - 108/76)  RR: 18 (10-27)  SpO2: 93% (10-27)  I&O's Summary    26 Oct 2023 07:01  -  27 Oct 2023 07:00  --------------------------------------------------------  IN: 905 mL / OUT: 950 mL / NET: -45 mL        Physical Exam:  Appearance: No acute distress; well appearing, breathless when speaking   Eyes: PERRL, EOMI, pink conjunctiva  HEENT: Normal oral mucosa  Cardiovascular: RRR, S1, S2, no murmurs, rubs, or gallops; no edema; JVD to mid neck   Respiratory: Mild crackles in the bases  Gastrointestinal: soft, non-tender, non-distended with normal bowel sounds  Musculoskeletal: No clubbing; no joint deformity   Neurologic: Non-focal  Lymphatic: No lymphadenopathy  Psychiatry: Mildly confused, AAO x2-3, no FND   Skin: No rashes, ecchymoses, or cyanosis                          11.7   8.75  )-----------( 201      ( 27 Oct 2023 05:58 )             37.6     10-27    138  |  103  |  41<H>  ----------------------------<  133<H>  3.9   |  20<L>  |  1.69<H>    Ca    9.2      27 Oct 2023 05:58  Mg     2.0     10-26    TPro  6.5  /  Alb  3.0<L>  /  TBili  0.5  /  DBili  x   /  AST  19  /  ALT  31  /  AlkPhos  63  10-27                  New ECG(s): Personally reviewed    CONCLUSIONS:      1. Left ventricular systolic function is severely decreased with an ejection fraction visually estimated at <20 %.   2. Systolic function.   3. LV apical thrombus is seen.      Limited study.   4. Findings were discussed with Yenni LAFLEUR on 10/26/2023 at 1.29pm.   5. Small pericardial effusion noted adjacent to the right ventricle with no evidence of hemodynamic compromise.   6. There is a left ventricular thrombus.   7. There is normal LV mass and normal geometry. Patient seen and examined at bedside.    Overnight Events: TTE completed showing LV EF<20% and confirmed LV thrombus. Patient reports that he is feeling well and ambulated some yesterday. I spoke to him about our conversation yesterday and the AT eval, pt unable to reiterate to me what we were discussing. Will attempt discussion with wife at bedside and [place]ian  today. Also consulting neuro for hx of CVA. Starting spironolactone 25 QDay and farxiga 10 QDay    Review of Systems: Negative except as per HPI     Current Meds:  acetaminophen     Tablet .. 650 milliGRAM(s) Oral every 6 hours PRN  albuterol    90 MICROgram(s) HFA Inhaler 2 Puff(s) Inhalation every 6 hours PRN  aspirin enteric coated 81 milliGRAM(s) Oral daily  atorvastatin 80 milliGRAM(s) Oral at bedtime  benzocaine/menthol Lozenge 1 Lozenge Oral every 2 hours PRN  buMETAnide 2 milliGRAM(s) Oral daily  chlorhexidine 2% Cloths 1 Application(s) Topical daily  dextrose 5%. 1000 milliLiter(s) IV Continuous <Continuous>  dextrose 5%. 1000 milliLiter(s) IV Continuous <Continuous>  dextrose 50% Injectable 12.5 Gram(s) IV Push once  dextrose 50% Injectable 25 Gram(s) IV Push once  dextrose 50% Injectable 25 Gram(s) IV Push once  dextrose Oral Gel 15 Gram(s) Oral once PRN  glucagon  Injectable 1 milliGRAM(s) IntraMuscular once  guaiFENesin Oral Liquid (Sugar-Free) 100 milliGRAM(s) Oral every 6 hours PRN  heparin   Injectable 5000 Unit(s) SubCutaneous every 8 hours  insulin glargine Injectable (LANTUS) 24 Unit(s) SubCutaneous at bedtime  insulin lispro (ADMELOG) corrective regimen sliding scale   SubCutaneous three times a day before meals  insulin lispro (ADMELOG) corrective regimen sliding scale   SubCutaneous at bedtime  insulin lispro Injectable (ADMELOG) 8 Unit(s) SubCutaneous three times a day before meals  pantoprazole    Tablet 40 milliGRAM(s) Oral before breakfast  polyethylene glycol 3350 17 Gram(s) Oral daily PRN  sacubitril 24 mG/valsartan 26 mG 1 Tablet(s) Oral two times a day  senna 2 Tablet(s) Oral at bedtime  tamsulosin 0.4 milliGRAM(s) Oral at bedtime      Vitals:  T(F): 98.6 (10-27), Max: 98.9 (10-26)  HR: 123 (10-27) (83 - 127)  BP: 98/66 (10-27) (94/62 - 108/76)  RR: 18 (10-27)  SpO2: 93% (10-27)  I&O's Summary    26 Oct 2023 07:01  -  27 Oct 2023 07:00  --------------------------------------------------------  IN: 905 mL / OUT: 950 mL / NET: -45 mL        Physical Exam:  Appearance: No acute distress; well appearing, breathless when speaking   Eyes: PERRL, EOMI, pink conjunctiva  HEENT: Normal oral mucosa  Cardiovascular: RRR, S1, S2, no murmurs, rubs, or gallops; no edema; JVD to mid neck   Respiratory: Mild crackles in the bases  Gastrointestinal: soft, non-tender, non-distended with normal bowel sounds  Musculoskeletal: No clubbing; no joint deformity   Neurologic: Non-focal  Lymphatic: No lymphadenopathy  Psychiatry: Mildly confused, AAO x2-3, no FND   Skin: No rashes, ecchymoses, or cyanosis                          11.7   8.75  )-----------( 201      ( 27 Oct 2023 05:58 )             37.6     10-27    138  |  103  |  41<H>  ----------------------------<  133<H>  3.9   |  20<L>  |  1.69<H>    Ca    9.2      27 Oct 2023 05:58  Mg     2.0     10-26    TPro  6.5  /  Alb  3.0<L>  /  TBili  0.5  /  DBili  x   /  AST  19  /  ALT  31  /  AlkPhos  63  10-27                  New ECG(s): Personally reviewed    CONCLUSIONS:      1. Left ventricular systolic function is severely decreased with an ejection fraction visually estimated at <20 %.   2. Systolic function.   3. LV apical thrombus is seen.      Limited study.   4. Findings were discussed with Yenni LAFLEUR on 10/26/2023 at 1.29pm.   5. Small pericardial effusion noted adjacent to the right ventricle with no evidence of hemodynamic compromise.   6. There is a left ventricular thrombus.   7. There is normal LV mass and normal geometry. Patient seen and examined at bedside.    Overnight Events: TTE completed showing LV EF<20% and confirmed LV thrombus. Patient reports that he is feeling well and ambulated some yesterday. I spoke to him about our conversation yesterday and the AT eval, pt unable to reiterate to me what we were discussing. Will attempt discussion with wife at bedside and [place]  today. Also consulting neuro for hx of CVA. Starting spironolactone 25 QDay and farxiga 10 QDay and Dig load     Review of Systems: Negative except as per HPI     Current Meds:  acetaminophen     Tablet .. 650 milliGRAM(s) Oral every 6 hours PRN  albuterol    90 MICROgram(s) HFA Inhaler 2 Puff(s) Inhalation every 6 hours PRN  aspirin enteric coated 81 milliGRAM(s) Oral daily  atorvastatin 80 milliGRAM(s) Oral at bedtime  benzocaine/menthol Lozenge 1 Lozenge Oral every 2 hours PRN  buMETAnide 2 milliGRAM(s) Oral daily  chlorhexidine 2% Cloths 1 Application(s) Topical daily  dextrose 5%. 1000 milliLiter(s) IV Continuous <Continuous>  dextrose 5%. 1000 milliLiter(s) IV Continuous <Continuous>  dextrose 50% Injectable 12.5 Gram(s) IV Push once  dextrose 50% Injectable 25 Gram(s) IV Push once  dextrose 50% Injectable 25 Gram(s) IV Push once  dextrose Oral Gel 15 Gram(s) Oral once PRN  glucagon  Injectable 1 milliGRAM(s) IntraMuscular once  guaiFENesin Oral Liquid (Sugar-Free) 100 milliGRAM(s) Oral every 6 hours PRN  heparin   Injectable 5000 Unit(s) SubCutaneous every 8 hours  insulin glargine Injectable (LANTUS) 24 Unit(s) SubCutaneous at bedtime  insulin lispro (ADMELOG) corrective regimen sliding scale   SubCutaneous three times a day before meals  insulin lispro (ADMELOG) corrective regimen sliding scale   SubCutaneous at bedtime  insulin lispro Injectable (ADMELOG) 8 Unit(s) SubCutaneous three times a day before meals  pantoprazole    Tablet 40 milliGRAM(s) Oral before breakfast  polyethylene glycol 3350 17 Gram(s) Oral daily PRN  sacubitril 24 mG/valsartan 26 mG 1 Tablet(s) Oral two times a day  senna 2 Tablet(s) Oral at bedtime  tamsulosin 0.4 milliGRAM(s) Oral at bedtime      Vitals:  T(F): 98.6 (10-27), Max: 98.9 (10-26)  HR: 123 (10-27) (83 - 127)  BP: 98/66 (10-27) (94/62 - 108/76)  RR: 18 (10-27)  SpO2: 93% (10-27)  I&O's Summary    26 Oct 2023 07:01  -  27 Oct 2023 07:00  --------------------------------------------------------  IN: 905 mL / OUT: 950 mL / NET: -45 mL        Physical Exam:  Appearance: No acute distress; well appearing, breathless when speaking   Eyes: PERRL, EOMI, pink conjunctiva  HEENT: Normal oral mucosa  Cardiovascular: RRR, S1, S2, no murmurs, rubs, or gallops; no edema; JVD to mid neck   Respiratory: Mild crackles in the bases  Gastrointestinal: soft, non-tender, non-distended with normal bowel sounds  Musculoskeletal: No clubbing; no joint deformity   Neurologic: Non-focal  Lymphatic: No lymphadenopathy  Psychiatry: Mildly confused, AAO x2-3, no FND   Skin: No rashes, ecchymoses, or cyanosis                          11.7   8.75  )-----------( 201      ( 27 Oct 2023 05:58 )             37.6     10-27    138  |  103  |  41<H>  ----------------------------<  133<H>  3.9   |  20<L>  |  1.69<H>    Ca    9.2      27 Oct 2023 05:58  Mg     2.0     10-26    TPro  6.5  /  Alb  3.0<L>  /  TBili  0.5  /  DBili  x   /  AST  19  /  ALT  31  /  AlkPhos  63  10-27                  New ECG(s): Personally reviewed    CONCLUSIONS:      1. Left ventricular systolic function is severely decreased with an ejection fraction visually estimated at <20 %.   2. Systolic function.   3. LV apical thrombus is seen.      Limited study.   4. Findings were discussed with Yenni LAFLEUR on 10/26/2023 at 1.29pm.   5. Small pericardial effusion noted adjacent to the right ventricle with no evidence of hemodynamic compromise.   6. There is a left ventricular thrombus.   7. There is normal LV mass and normal geometry.

## 2023-10-27 NOTE — PROVIDER CONTACT NOTE (CRITICAL VALUE NOTIFICATION) - BACKGROUND
Pt admit for heart failure workup. On heparin gtt for LV thrombus.
Patient to be transferred to [facility]icu

## 2023-10-27 NOTE — PROGRESS NOTE ADULT - SUBJECTIVE AND OBJECTIVE BOX
[facility] Division of Hospital Medicine  Jaun Junior DO  Pager (M-F, 8A-5P):  MS Teams PREFERRED        SUBJECTIVE / OVERNIGHT EVENTS:  Patient seen at the bedside during morning rounds in no acute distress, denies abdominal pain, nausea, vomiting, chest pain, shortness of breath.     MEDICATIONS  (STANDING):  aspirin enteric coated 81 milliGRAM(s) Oral daily  atorvastatin 80 milliGRAM(s) Oral at bedtime  buMETAnide 2 milliGRAM(s) Oral daily  chlorhexidine 2% Cloths 1 Application(s) Topical daily  dextrose 5%. 1000 milliLiter(s) (50 mL/Hr) IV Continuous <Continuous>  dextrose 5%. 1000 milliLiter(s) (100 mL/Hr) IV Continuous <Continuous>  dextrose 50% Injectable 25 Gram(s) IV Push once  dextrose 50% Injectable 12.5 Gram(s) IV Push once  dextrose 50% Injectable 25 Gram(s) IV Push once  glucagon  Injectable 1 milliGRAM(s) IntraMuscular once  heparin   Injectable 5000 Unit(s) SubCutaneous every 8 hours  insulin glargine Injectable (LANTUS) 24 Unit(s) SubCutaneous at bedtime  insulin lispro (ADMELOG) corrective regimen sliding scale   SubCutaneous at bedtime  insulin lispro (ADMELOG) corrective regimen sliding scale   SubCutaneous three times a day before meals  insulin lispro Injectable (ADMELOG) 8 Unit(s) SubCutaneous three times a day before meals  pantoprazole    Tablet 40 milliGRAM(s) Oral before breakfast  sacubitril 24 mG/valsartan 26 mG 1 Tablet(s) Oral two times a day  senna 2 Tablet(s) Oral at bedtime  tamsulosin 0.4 milliGRAM(s) Oral at bedtime    MEDICATIONS  (PRN):  acetaminophen     Tablet .. 650 milliGRAM(s) Oral every 6 hours PRN Temp greater or equal to 38C (100.4F), Mild Pain (1 - 3)  albuterol    90 MICROgram(s) HFA Inhaler 2 Puff(s) Inhalation every 6 hours PRN Shortness of Breath and/or Wheezing  benzocaine/menthol Lozenge 1 Lozenge Oral every 2 hours PRN Sore Throat  dextrose Oral Gel 15 Gram(s) Oral once PRN Blood Glucose LESS THAN 70 milliGRAM(s)/deciliter  guaiFENesin Oral Liquid (Sugar-Free) 100 milliGRAM(s) Oral every 6 hours PRN Cough  polyethylene glycol 3350 17 Gram(s) Oral daily PRN Constipation      I&O's Summary    26 Oct 2023 07:01  -  27 Oct 2023 07:00  --------------------------------------------------------  IN: 905 mL / OUT: 950 mL / NET: -45 mL        PHYSICAL EXAM:  Vital Signs Last 24 Hrs  T(C): 37 (27 Oct 2023 04:36), Max: 37.2 (26 Oct 2023 10:11)  T(F): 98.6 (27 Oct 2023 04:36), Max: 98.9 (26 Oct 2023 10:11)  HR: 123 (27 Oct 2023 04:36) (83 - 127)  BP: 98/66 (27 Oct 2023 04:36) (94/62 - 108/76)  BP(mean): 75 (26 Oct 2023 20:10) (75 - 76)  RR: 18 (27 Oct 2023 04:36) (18 - 18)  SpO2: 93% (27 Oct 2023 04:36) (93% - 99%)    Parameters below as of 27 Oct 2023 04:36  Patient On (Oxygen Delivery Method): room air      Appearance: No acute distress; well appearing, breathless when speaking   Eyes: PERRL, EOMI, pink conjunctiva  HEENT: Normal oral mucosa  Cardiovascular: RRR, S1, S2, no murmurs, rubs, or gallops; no edema; JVD to mid neck   Respiratory: Mild crackles in the bases  Gastrointestinal: soft, non-tender, non-distended with normal bowel sounds  Musculoskeletal: No clubbing; no joint deformity   Neurologic: Non-focal  Lymphatic: No lymphadenopathy  Psychiatry: Mildly confused, AAO x2-3, no FND   Skin: No rashes, ecchymoses, or cyanosis    LABS:                        11.7   8.75  )-----------( 201      ( 27 Oct 2023 05:58 )             37.6     10-27    138  |  103  |  41<H>  ----------------------------<  133<H>  3.9   |  20<L>  |  1.69<H>    Ca    9.2      27 Oct 2023 05:58  Mg     2.0     10-26    TPro  6.5  /  Alb  3.0<L>  /  TBili  0.5  /  DBili  x   /  AST  19  /  ALT  31  /  AlkPhos  63  10-27          Urinalysis Basic - ( 27 Oct 2023 05:58 )    Color: x / Appearance: x / SG: x / pH: x  Gluc: 133 mg/dL / Ketone: x  / Bili: x / Urobili: x   Blood: x / Protein: x / Nitrite: x   Leuk Esterase: x / RBC: x / WBC x   Sq Epi: x / Non Sq Epi: x / Bacteria: x          RADIOLOGY & ADDITIONAL TESTS:  Results Reviewed: CBC/CMP personally reviewed by me Hgb 11.7, WBC 8.75 Cr 1.69(Improved)  Imaging Personally Reviewed:  Electrocardiogram Personally Reviewed:    COORDINATION OF CARE:  Care Discussed with Consultants/Other Providers [Y/N]: Y  Prior or Outpatient Records Reviewed [Y/N]: Y

## 2023-10-27 NOTE — PROGRESS NOTE ADULT - PROBLEM SELECTOR PLAN 1
ECHO 10/16: EF 20%, severely reduced LVSF. HF team following. Discontinued Torpol XL 10/20  Started on Entresto 24/26 BID  On Bumex gtt plan to switch to PO Bumex 2mg PO daily on 10/25.   Off Hydralazine/nitrates.     Per HF team: severe LV dysfunction with tachycardia and poor non-invasive hemodynamics concerning, currently holding off on launching VAD/transplant eval however may need to reassess. Of note he has good support and no clear psychosocial red flags. ABO AB. would need neurologic assessment with prior CVA and ideally improved conditioning given ongoing prolonged hospitalization  EP called for upgrade of device--> no indication at this time.   Followup Heart failure recs.

## 2023-10-27 NOTE — PROGRESS NOTE ADULT - ASSESSMENT
57 yo Male pmhx CVA with mild left sided deficits, HTN, DM2, vertigo, HLD, Mobitz II s/pp Medtronic dual chamber ICD (22) initially presented to [facility] from home with complaints of dyspnea and chest pain and was admitted w/ acute hypoxic respiratory failure likely due to acute pulmonary edema due to acute HFrEF in setting of acute NSTEMI, LAURA and enterovirus infection.    Pt with brief MICU stay at [facility] requiring bipap (no intubation), was treated for PNA with cefepime, and was found to have TTE done 23 showing EF 20% with severely decreased LVSF, multiple regional wall motion abnormalities, and elevated LV end diastolic pressure. Pt was transferred to [facility] for cardiac cath evaluation.     S/p LHC on 10/16 w/ 3v disease and RHC on 10/19 for hemodynamic assessment- showed CVP of 10, PCWP 29 and CI of 2.3, started empirically on  for shortness of breath but repeat RHC with almost identical numbers now. Admitted to CICU for CABG eval vs. advanced therapy vs. high risk PCI. Deemed to be too high risk for CABG and given hydralazine for afterload reduction and diuresis with bumex. Heart Failure team following and currently holding off on advanced therapy discussion.     Pt now transferred to the floor. Pending Heart Failure recs regarding PCI vs further intervention--> Recommend EP for upgrade of device. EP called for consult--> recommend no indication for upgrade at this time.     As per HF: Spoke to Dr. Puja Santoro regarding possible revascularization. Pt is high risk and would likely need MCS support for his intervention. Last TTE showed c/f possible LVT, will re-order today and then talk to pt and his wife about AT eval. Stopping ISDN/Hydral, starting entresto

## 2023-10-28 DIAGNOSIS — I63.9 CEREBRAL INFARCTION, UNSPECIFIED: ICD-10-CM

## 2023-10-28 LAB
ALBUMIN SERPL ELPH-MCNC: 3.2 G/DL — LOW (ref 3.3–5)
ALBUMIN SERPL ELPH-MCNC: 3.2 G/DL — LOW (ref 3.3–5)
ALP SERPL-CCNC: 66 U/L — SIGNIFICANT CHANGE UP (ref 40–120)
ALP SERPL-CCNC: 66 U/L — SIGNIFICANT CHANGE UP (ref 40–120)
ALT FLD-CCNC: 28 U/L — SIGNIFICANT CHANGE UP (ref 10–45)
ALT FLD-CCNC: 28 U/L — SIGNIFICANT CHANGE UP (ref 10–45)
ANION GAP SERPL CALC-SCNC: 15 MMOL/L — SIGNIFICANT CHANGE UP (ref 5–17)
ANION GAP SERPL CALC-SCNC: 15 MMOL/L — SIGNIFICANT CHANGE UP (ref 5–17)
APPEARANCE UR: CLEAR — SIGNIFICANT CHANGE UP
APPEARANCE UR: CLEAR — SIGNIFICANT CHANGE UP
APTT BLD: 52.2 SEC — HIGH (ref 24.5–35.6)
APTT BLD: 52.2 SEC — HIGH (ref 24.5–35.6)
APTT BLD: 62.6 SEC — HIGH (ref 24.5–35.6)
APTT BLD: 62.6 SEC — HIGH (ref 24.5–35.6)
APTT BLD: 86.2 SEC — HIGH (ref 24.5–35.6)
APTT BLD: 86.2 SEC — HIGH (ref 24.5–35.6)
AST SERPL-CCNC: 14 U/L — SIGNIFICANT CHANGE UP (ref 10–40)
AST SERPL-CCNC: 14 U/L — SIGNIFICANT CHANGE UP (ref 10–40)
BILIRUB SERPL-MCNC: 0.6 MG/DL — SIGNIFICANT CHANGE UP (ref 0.2–1.2)
BILIRUB SERPL-MCNC: 0.6 MG/DL — SIGNIFICANT CHANGE UP (ref 0.2–1.2)
BILIRUB UR-MCNC: NEGATIVE — SIGNIFICANT CHANGE UP
BILIRUB UR-MCNC: NEGATIVE — SIGNIFICANT CHANGE UP
BUN SERPL-MCNC: 44 MG/DL — HIGH (ref 7–23)
BUN SERPL-MCNC: 44 MG/DL — HIGH (ref 7–23)
CALCIUM SERPL-MCNC: 8.8 MG/DL — SIGNIFICANT CHANGE UP (ref 8.4–10.5)
CALCIUM SERPL-MCNC: 8.8 MG/DL — SIGNIFICANT CHANGE UP (ref 8.4–10.5)
CHLORIDE SERPL-SCNC: 101 MMOL/L — SIGNIFICANT CHANGE UP (ref 96–108)
CHLORIDE SERPL-SCNC: 101 MMOL/L — SIGNIFICANT CHANGE UP (ref 96–108)
CO2 SERPL-SCNC: 21 MMOL/L — LOW (ref 22–31)
CO2 SERPL-SCNC: 21 MMOL/L — LOW (ref 22–31)
COLOR SPEC: SIGNIFICANT CHANGE UP
COLOR SPEC: SIGNIFICANT CHANGE UP
CREAT SERPL-MCNC: 1.74 MG/DL — HIGH (ref 0.5–1.3)
CREAT SERPL-MCNC: 1.74 MG/DL — HIGH (ref 0.5–1.3)
DIFF PNL FLD: NEGATIVE — SIGNIFICANT CHANGE UP
DIFF PNL FLD: NEGATIVE — SIGNIFICANT CHANGE UP
EGFR: 45 ML/MIN/1.73M2 — LOW
EGFR: 45 ML/MIN/1.73M2 — LOW
GLUCOSE BLDC GLUCOMTR-MCNC: 162 MG/DL — HIGH (ref 70–99)
GLUCOSE BLDC GLUCOMTR-MCNC: 162 MG/DL — HIGH (ref 70–99)
GLUCOSE BLDC GLUCOMTR-MCNC: 174 MG/DL — HIGH (ref 70–99)
GLUCOSE BLDC GLUCOMTR-MCNC: 174 MG/DL — HIGH (ref 70–99)
GLUCOSE BLDC GLUCOMTR-MCNC: 200 MG/DL — HIGH (ref 70–99)
GLUCOSE BLDC GLUCOMTR-MCNC: 200 MG/DL — HIGH (ref 70–99)
GLUCOSE BLDC GLUCOMTR-MCNC: 324 MG/DL — HIGH (ref 70–99)
GLUCOSE BLDC GLUCOMTR-MCNC: 324 MG/DL — HIGH (ref 70–99)
GLUCOSE SERPL-MCNC: 192 MG/DL — HIGH (ref 70–99)
GLUCOSE SERPL-MCNC: 192 MG/DL — HIGH (ref 70–99)
GLUCOSE UR QL: ABNORMAL
GLUCOSE UR QL: ABNORMAL
HCT VFR BLD CALC: 37.7 % — LOW (ref 39–50)
HCT VFR BLD CALC: 37.7 % — LOW (ref 39–50)
HGB BLD-MCNC: 11.8 G/DL — LOW (ref 13–17)
HGB BLD-MCNC: 11.8 G/DL — LOW (ref 13–17)
KETONES UR-MCNC: SIGNIFICANT CHANGE UP
KETONES UR-MCNC: SIGNIFICANT CHANGE UP
LACTATE SERPL-SCNC: 1.7 MMOL/L — SIGNIFICANT CHANGE UP (ref 0.5–2)
LACTATE SERPL-SCNC: 1.7 MMOL/L — SIGNIFICANT CHANGE UP (ref 0.5–2)
LACTATE SERPL-SCNC: 3.3 MMOL/L — HIGH (ref 0.5–2)
LACTATE SERPL-SCNC: 3.3 MMOL/L — HIGH (ref 0.5–2)
LEUKOCYTE ESTERASE UR-ACNC: NEGATIVE — SIGNIFICANT CHANGE UP
LEUKOCYTE ESTERASE UR-ACNC: NEGATIVE — SIGNIFICANT CHANGE UP
MCHC RBC-ENTMCNC: 25.7 PG — LOW (ref 27–34)
MCHC RBC-ENTMCNC: 25.7 PG — LOW (ref 27–34)
MCHC RBC-ENTMCNC: 31.3 GM/DL — LOW (ref 32–36)
MCHC RBC-ENTMCNC: 31.3 GM/DL — LOW (ref 32–36)
MCV RBC AUTO: 82 FL — SIGNIFICANT CHANGE UP (ref 80–100)
MCV RBC AUTO: 82 FL — SIGNIFICANT CHANGE UP (ref 80–100)
NITRITE UR-MCNC: NEGATIVE — SIGNIFICANT CHANGE UP
NITRITE UR-MCNC: NEGATIVE — SIGNIFICANT CHANGE UP
NRBC # BLD: 0 /100 WBCS — SIGNIFICANT CHANGE UP (ref 0–0)
NRBC # BLD: 0 /100 WBCS — SIGNIFICANT CHANGE UP (ref 0–0)
NT-PROBNP SERPL-SCNC: HIGH PG/ML (ref 0–300)
NT-PROBNP SERPL-SCNC: HIGH PG/ML (ref 0–300)
PH UR: 6 — SIGNIFICANT CHANGE UP (ref 5–8)
PH UR: 6 — SIGNIFICANT CHANGE UP (ref 5–8)
PLATELET # BLD AUTO: 210 K/UL — SIGNIFICANT CHANGE UP (ref 150–400)
PLATELET # BLD AUTO: 210 K/UL — SIGNIFICANT CHANGE UP (ref 150–400)
POTASSIUM SERPL-MCNC: 4.1 MMOL/L — SIGNIFICANT CHANGE UP (ref 3.5–5.3)
POTASSIUM SERPL-MCNC: 4.1 MMOL/L — SIGNIFICANT CHANGE UP (ref 3.5–5.3)
POTASSIUM SERPL-SCNC: 4.1 MMOL/L — SIGNIFICANT CHANGE UP (ref 3.5–5.3)
POTASSIUM SERPL-SCNC: 4.1 MMOL/L — SIGNIFICANT CHANGE UP (ref 3.5–5.3)
PROT SERPL-MCNC: 6.4 G/DL — SIGNIFICANT CHANGE UP (ref 6–8.3)
PROT SERPL-MCNC: 6.4 G/DL — SIGNIFICANT CHANGE UP (ref 6–8.3)
PROT UR-MCNC: SIGNIFICANT CHANGE UP
PROT UR-MCNC: SIGNIFICANT CHANGE UP
RBC # BLD: 4.6 M/UL — SIGNIFICANT CHANGE UP (ref 4.2–5.8)
RBC # BLD: 4.6 M/UL — SIGNIFICANT CHANGE UP (ref 4.2–5.8)
RBC # FLD: 15.5 % — HIGH (ref 10.3–14.5)
RBC # FLD: 15.5 % — HIGH (ref 10.3–14.5)
SODIUM SERPL-SCNC: 137 MMOL/L — SIGNIFICANT CHANGE UP (ref 135–145)
SODIUM SERPL-SCNC: 137 MMOL/L — SIGNIFICANT CHANGE UP (ref 135–145)
SP GR SPEC: 1.02 — SIGNIFICANT CHANGE UP (ref 1.01–1.02)
SP GR SPEC: 1.02 — SIGNIFICANT CHANGE UP (ref 1.01–1.02)
UROBILINOGEN FLD QL: NEGATIVE — SIGNIFICANT CHANGE UP
UROBILINOGEN FLD QL: NEGATIVE — SIGNIFICANT CHANGE UP
WBC # BLD: 10.38 K/UL — SIGNIFICANT CHANGE UP (ref 3.8–10.5)
WBC # BLD: 10.38 K/UL — SIGNIFICANT CHANGE UP (ref 3.8–10.5)
WBC # FLD AUTO: 10.38 K/UL — SIGNIFICANT CHANGE UP (ref 3.8–10.5)
WBC # FLD AUTO: 10.38 K/UL — SIGNIFICANT CHANGE UP (ref 3.8–10.5)

## 2023-10-28 PROCEDURE — 99233 SBSQ HOSP IP/OBS HIGH 50: CPT | Mod: GC

## 2023-10-28 PROCEDURE — 99232 SBSQ HOSP IP/OBS MODERATE 35: CPT

## 2023-10-28 RX ORDER — MILRINONE LACTATE 1 MG/ML
0.12 INJECTION, SOLUTION INTRAVENOUS
Qty: 20 | Refills: 0 | Status: DISCONTINUED | OUTPATIENT
Start: 2023-10-28 | End: 2023-11-01

## 2023-10-28 RX ORDER — BUMETANIDE 0.25 MG/ML
3 INJECTION INTRAMUSCULAR; INTRAVENOUS ONCE
Refills: 0 | Status: COMPLETED | OUTPATIENT
Start: 2023-10-28 | End: 2023-10-28

## 2023-10-28 RX ADMIN — SENNA PLUS 2 TABLET(S): 8.6 TABLET ORAL at 21:19

## 2023-10-28 RX ADMIN — Medication 8 UNIT(S): at 08:33

## 2023-10-28 RX ADMIN — Medication 1: at 08:26

## 2023-10-28 RX ADMIN — CHLORHEXIDINE GLUCONATE 1 APPLICATION(S): 213 SOLUTION TOPICAL at 23:00

## 2023-10-28 RX ADMIN — BUMETANIDE 124 MILLIGRAM(S): 0.25 INJECTION INTRAMUSCULAR; INTRAVENOUS at 21:38

## 2023-10-28 RX ADMIN — ATORVASTATIN CALCIUM 80 MILLIGRAM(S): 80 TABLET, FILM COATED ORAL at 21:19

## 2023-10-28 RX ADMIN — TAMSULOSIN HYDROCHLORIDE 0.4 MILLIGRAM(S): 0.4 CAPSULE ORAL at 21:19

## 2023-10-28 RX ADMIN — HEPARIN SODIUM 1200 UNIT(S)/HR: 5000 INJECTION INTRAVENOUS; SUBCUTANEOUS at 06:57

## 2023-10-28 RX ADMIN — PANTOPRAZOLE SODIUM 40 MILLIGRAM(S): 20 TABLET, DELAYED RELEASE ORAL at 06:17

## 2023-10-28 RX ADMIN — Medication 250 MICROGRAM(S): at 13:01

## 2023-10-28 RX ADMIN — INSULIN GLARGINE 24 UNIT(S): 100 INJECTION, SOLUTION SUBCUTANEOUS at 21:38

## 2023-10-28 RX ADMIN — Medication 8 UNIT(S): at 12:24

## 2023-10-28 RX ADMIN — HEPARIN SODIUM 1200 UNIT(S)/HR: 5000 INJECTION INTRAVENOUS; SUBCUTANEOUS at 00:14

## 2023-10-28 RX ADMIN — Medication 81 MILLIGRAM(S): at 13:01

## 2023-10-28 RX ADMIN — BUMETANIDE 2 MILLIGRAM(S): 0.25 INJECTION INTRAMUSCULAR; INTRAVENOUS at 05:33

## 2023-10-28 RX ADMIN — Medication 250 MICROGRAM(S): at 05:33

## 2023-10-28 RX ADMIN — SACUBITRIL AND VALSARTAN 1 TABLET(S): 24; 26 TABLET, FILM COATED ORAL at 05:32

## 2023-10-28 RX ADMIN — CHLORHEXIDINE GLUCONATE 1 APPLICATION(S): 213 SOLUTION TOPICAL at 09:42

## 2023-10-28 RX ADMIN — Medication 8 UNIT(S): at 17:19

## 2023-10-28 RX ADMIN — Medication 4: at 17:19

## 2023-10-28 RX ADMIN — MILRINONE LACTATE 2.63 MICROGRAM(S)/KG/MIN: 1 INJECTION, SOLUTION INTRAVENOUS at 23:00

## 2023-10-28 RX ADMIN — Medication 1: at 12:25

## 2023-10-28 RX ADMIN — MILRINONE LACTATE 2.63 MICROGRAM(S)/KG/MIN: 1 INJECTION, SOLUTION INTRAVENOUS at 18:42

## 2023-10-28 NOTE — PROGRESS NOTE ADULT - PROBLEM SELECTOR PLAN 1
#HFrEF  - Etiology: ICM  - Last TTE: EF <20% rMWA  - Cath: : 95% discrete proximal LAD disease and sequential multifocal disease with good distal target, 80-90% proximal LCx disease just prior to marginals, severe multifocal RCA disease with good targets   - NYHA: III  - ACC: C  - SCAI: B  - GDMT:    > BB: Holding iso compensatory tachycardia, start dig load 0.25 Q6 x4 dose followed by 0.125 QDay 10/27    > ACE/ARB/ARNI: Entresto 24-26 BID    > MRA: Start spironolactone 25 QDay    > SGLT2 I: Start Farxiga 10 QDay   - Diuretics: Bumex 2 QDay  - Volume Status: Mildly hypervolemic   - Advanced therapies: severe LV dysfunction with tachycardia and poor non-invasive hemodynamics concerning, currently holding off on launching VAD/transplant eval however may need to reassess. Of note he has good support and no clear psychosocial red flags. ABO AB. would need neurologic assessment with prior CVA and ideally improved conditioning given ongoing prolonged hospitalization.  - F/U PT recs #HFrEF  - Etiology: ICM  - Last TTE: EF <20% rMWA  - Cath: : 95% discrete proximal LAD disease and sequential multifocal disease with good distal target, 80-90% proximal LCx disease just prior to marginals, severe multifocal RCA disease with good targets   - NYHA: III  - ACC: C  - SCAI: B  - GDMT:    > BB: Holding iso compensatory tachycardia, cont dig    > ACE/ARB/ARNI: Entresto 24-26 BID    > MRA: Cont spironolactone 25 QDay    > SGLT2 I: Cont Farxiga 10 QDay   - Diuretics: Bumex 2 QDay   > Give additional 3x1 today   > F/U proBNP   - Volume Status: Mildly hypervolemic   - Advanced therapies: severe LV dysfunction with tachycardia and poor non-invasive hemodynamics concerning, currently holding off on launching VAD/transplant eval however may need to reassess. Of note he has good support and no clear psychosocial red flags. ABO AB. would need neurologic assessment with prior CVA and ideally improved conditioning given ongoing prolonged hospitalization.  - F/U PT recs #HFrEF  - Etiology: ICM  - Last TTE: EF <20% rMWA  - Cath: : 95% discrete proximal LAD disease and sequential multifocal disease with good distal target, 80-90% proximal LCx disease just prior to marginals, severe multifocal RCA disease with good targets   - NYHA: III  - ACC: C  - SCAI: B  - GDMT:    > BB: Holding iso compensatory tachycardia, cont dig    > ACE/ARB/ARNI: Entresto 24-26 BID    > MRA: Cont spironolactone 25 QDay    > SGLT2 I: Cont Farxiga 10 QDay   - Diuretics: Bumex 2 QDay   > Give additional 3 mg IV x1 today   > F/U proBNP   - Volume Status: Mildly hypervolemic   - Advanced therapies: severe LV dysfunction with tachycardia and poor non-invasive hemodynamics concerning, currently holding off on launching VAD/transplant eval given cognitive issues however will continue to reassess. Of note he has good support and no clear psychosocial red flags. ABO AB. would need neurologic assessment with prior CVA and ideally improved conditioning given ongoing prolonged hospitalization.  - F/U PT recs

## 2023-10-28 NOTE — PROGRESS NOTE ADULT - ASSESSMENT
59 YO M with a history of CVA with residual mild R paresthesias, second degree Mobitz II heart block s/p DC-ICD 12/2022 (initial concern for sarcoid but negative biopsy/PET post procedure), DM2 (A1c 8.4%), and HTN who was admitted to [facility] with chest pain and dyspnea, found to have NSTEMI with markedly elevated troponins and new severe LV dysfunction with regional wall motion abnormalities as well as + enterovirus. He required NIPPV and was diuresed before being transferred to [facility] where LHC performed which revealed severe 3v CAD with critical proximal LAD involvement. CTS was consulted for CABG evaluation and HF consulted for comanagement.    He ultimately underwent RHC with revealed elevated wedge but normal cardiac output. Within the past 24 hours patient has become more tachycardic, is cool on exam and noted to have elevated JVP. He was transferred to CICU for swan placement and closer observation of hemodynamics. At this time patient would be consider poor candidate for CABG however would  benefit from high risk PCI with the potential need for advance therapies      REVIEW OF STUDIES  TTE 10/26: EF <20% LVT present, small pericardial effusion w/o tamponade  RHC 10/19: RA 10, RV 47/9/13, Wedge 29, PA 41/26/33, CO/CI 4.4/2.3, PA sat 57.3  EKG: sinus tachycardia, septal q-waves, left axis deviation   TTE 10/16/23: LV 5.5 cm, LVEF 20% with regional WMAs worse in the apex, LVOT VTI 8 cm, normal RV size/function, severe functional MR, small pericardial effusion, estimated RA pressure 8 mmHg   TTE 12/2022: normal LVEF   LHC: 95% discrete proximal LAD disease and sequential multifocal disease with good distal target, 80-90% proximal LCx disease just prior to marginals, severe multifocal RCA disease with good targets     Hemodynamics:  10/21/23: RA 13 with V-wave 21, PA 50/33, PCW 33, MvO2 68.2, Cris CO/CI 4.4/2.56

## 2023-10-28 NOTE — PROGRESS NOTE ADULT - SUBJECTIVE AND OBJECTIVE BOX
Neurology        S: patient seen and examined. [facility] with cerebellar infarct       Medications: MEDICATIONS  (STANDING):  aspirin enteric coated 81 milliGRAM(s) Oral daily  atorvastatin 80 milliGRAM(s) Oral at bedtime  buMETAnide 2 milliGRAM(s) Oral daily  chlorhexidine 2% Cloths 1 Application(s) Topical daily  dapagliflozin 10 milliGRAM(s) Oral every 24 hours  dextrose 5%. 1000 milliLiter(s) (50 mL/Hr) IV Continuous <Continuous>  dextrose 5%. 1000 milliLiter(s) (100 mL/Hr) IV Continuous <Continuous>  dextrose 50% Injectable 25 Gram(s) IV Push once  dextrose 50% Injectable 12.5 Gram(s) IV Push once  dextrose 50% Injectable 25 Gram(s) IV Push once  digoxin  Injectable 250 MICROGram(s) IV Push every 6 hours  glucagon  Injectable 1 milliGRAM(s) IntraMuscular once  heparin  Infusion.  Unit(s)/Hr (12 mL/Hr) IV Continuous <Continuous>  insulin glargine Injectable (LANTUS) 24 Unit(s) SubCutaneous at bedtime  insulin lispro (ADMELOG) corrective regimen sliding scale   SubCutaneous three times a day before meals  insulin lispro (ADMELOG) corrective regimen sliding scale   SubCutaneous at bedtime  insulin lispro Injectable (ADMELOG) 8 Unit(s) SubCutaneous three times a day before meals  pantoprazole    Tablet 40 milliGRAM(s) Oral before breakfast  sacubitril 24 mG/valsartan 26 mG 1 Tablet(s) Oral two times a day  senna 2 Tablet(s) Oral at bedtime  tamsulosin 0.4 milliGRAM(s) Oral at bedtime    MEDICATIONS  (PRN):  acetaminophen     Tablet .. 650 milliGRAM(s) Oral every 6 hours PRN Temp greater or equal to 38C (100.4F), Mild Pain (1 - 3)  albuterol    90 MICROgram(s) HFA Inhaler 2 Puff(s) Inhalation every 6 hours PRN Shortness of Breath and/or Wheezing  benzocaine/menthol Lozenge 1 Lozenge Oral every 2 hours PRN Sore Throat  dextrose Oral Gel 15 Gram(s) Oral once PRN Blood Glucose LESS THAN 70 milliGRAM(s)/deciliter  guaiFENesin Oral Liquid (Sugar-Free) 100 milliGRAM(s) Oral every 6 hours PRN Cough  heparin   Injectable 2500 Unit(s) IV Push every 6 hours PRN For aPTT between 40 - 57  heparin   Injectable 5500 Unit(s) IV Push every 6 hours PRN For aPTT less than 40  polyethylene glycol 3350 17 Gram(s) Oral daily PRN Constipation       Vitals:  Vital Signs Last 24 Hrs  T(C): 36.7 (28 Oct 2023 05:21), Max: 36.9 (27 Oct 2023 11:30)  T(F): 98 (28 Oct 2023 05:21), Max: 98.4 (27 Oct 2023 11:30)  HR: 124 (28 Oct 2023 05:21) (122 - 125)  BP: 110/74 (28 Oct 2023 05:21) (88/57 - 110/74)  BP(mean): --  RR: 18 (28 Oct 2023 05:21) (18 - 20)  SpO2: 90% (28 Oct 2023 05:21) (90% - 96%)    Parameters below as of 28 Oct 2023 05:21  Patient On (Oxygen Delivery Method): room air            General Exam:   General Appearance: Appropriately dressed and in no acute distress       Head: Normocephalic, atraumatic and no dysmorphic features  Ear, Nose, and Throat: Moist mucous membranes  CVS: S1S2+  Resp: No SOB, no wheeze or rhonchi  GI: soft NT/ND  Extremities: No edema or cyanosis  Skin: No bruises or rashes     Neurological Exam:  Mental Status: Awake, alert and oriented x 2.  Able to follow simple verbal commands. Able to name and repeat. fluent speech. No obvious aphasia or dysarthria noted. poor insight. not understanding why he is in hospital   Cranial Nerves: PERRL, EOMI, VFFC, sensation V1-V3 intact,  no obvious facial asymmetry, equal elevation of palate, scm/trap 5/5, tongue is midline on protrusion. no obvious papilledema on fundoscopic exam. hearing is grossly intact.   Motor: Normal bulk, tone and strength throughout. Fine finger movements were intact and symmetric. no tremors or drift noted.    Sensation: Intact to light touch and pinprick throughout. no right/left confusion. no extinction to tactile on DSS. Romberg was negative.   Reflexes: 1+ throughout at biceps, brachioradialis, triceps, patellars and ankles bilaterally and equal. No clonus. R toe and L toe were both downgoing.  Coordination: No dysmetria on FNF or HKS  Gait:   no limitations in gait.     Data/Labs/Imaging which I personally reviewed.     Labs:     CBC Full  -  ( 27 Oct 2023 05:58 )  WBC Count : 8.75 K/uL  RBC Count : 4.62 M/uL  Hemoglobin : 11.7 g/dL  Hematocrit : 37.6 %  Platelet Count - Automated : 201 K/uL  Mean Cell Volume : 81.4 fl  Mean Cell Hemoglobin : 25.3 pg  Mean Cell Hemoglobin Concentration : 31.1 gm/dL  Auto Neutrophil # : x  Auto Lymphocyte # : x  Auto Monocyte # : x  Auto Eosinophil # : x  Auto Basophil # : x  Auto Neutrophil % : x  Auto Lymphocyte % : x  Auto Monocyte % : x  Auto Eosinophil % : x  Auto Basophil % : x    10-27    138  |  103  |  41<H>  ----------------------------<  133<H>  3.9   |  20<L>  |  1.69<H>    Ca    9.2      27 Oct 2023 05:58  Mg     2.0     10-26    TPro  6.5  /  Alb  3.0<L>  /  TBili  0.5  /  DBili  x   /  AST  19  /  ALT  31  /  AlkPhos  63  10-27    LIVER FUNCTIONS - ( 27 Oct 2023 05:58 )  Alb: 3.0 g/dL / Pro: 6.5 g/dL / ALK PHOS: 63 U/L / ALT: 31 U/L / AST: 19 U/L / GGT: x             Urinalysis Basic - ( 27 Oct 2023 05:58 )    Color: x / Appearance: x / SG: x / pH: x  Gluc: 133 mg/dL / Ketone: x  / Bili: x / Urobili: x   Blood: x / Protein: x / Nitrite: x   Leuk Esterase: x / RBC: x / WBC x   Sq Epi: x / Non Sq Epi: x / Bacteria: x      < from: CT Head No Cont (10.27.23 @ 18:04) >    ACC: [id number] EXAM:  CT BRAIN   ORDERED BY: BRETT HOPSON     PROCEDURE DATE:  10/27/2023          INTERPRETATION:  Clinical Indication: CVA    5mm axial sections of the brain were obtained from base to vertex,   without the intravenous administration of contrast material. Coronal and   sagittal computer generated reconstructed views are available.    No prior brain imaging is available for comparison.          The ventricles and sulci are prominent consistent with mild atrophy.   Small vesselwhite matter ischemic changes are noted. There is a infarct   in the right medial cerebellum of undetermined age which could be acute   to subacute based on its degree of lucency. There is no significant   hemorrhage. Bone window examination is unremarkable. There is been   previous right-sided lens replacement surgery.      IMPRESSION: Atrophy and small vessel white matter ischemic changes. Right   medial cerebellar infarct may be acute versus subacute. No hemorrhage.      --- End of Report ---            ANDREW TONEY MD; Attending Radiologist  This document has been electronically signed. Oct 27 2023  6:25PM    < end of copied text >

## 2023-10-28 NOTE — PROGRESS NOTE ADULT - SUBJECTIVE AND OBJECTIVE BOX
Patient seen and examined at bedside.    Overnight Events: CTH showing acute vs subacute infarct. Neuro following **INCOMPLETE NOTE**    Review of Systems: Negative except as per HPI     Current Meds:  acetaminophen     Tablet .. 650 milliGRAM(s) Oral every 6 hours PRN  albuterol    90 MICROgram(s) HFA Inhaler 2 Puff(s) Inhalation every 6 hours PRN  aspirin enteric coated 81 milliGRAM(s) Oral daily  atorvastatin 80 milliGRAM(s) Oral at bedtime  benzocaine/menthol Lozenge 1 Lozenge Oral every 2 hours PRN  buMETAnide 2 milliGRAM(s) Oral daily  chlorhexidine 2% Cloths 1 Application(s) Topical daily  dapagliflozin 10 milliGRAM(s) Oral every 24 hours  dextrose 5%. 1000 milliLiter(s) IV Continuous <Continuous>  dextrose 5%. 1000 milliLiter(s) IV Continuous <Continuous>  dextrose 50% Injectable 12.5 Gram(s) IV Push once  dextrose 50% Injectable 25 Gram(s) IV Push once  dextrose 50% Injectable 25 Gram(s) IV Push once  dextrose Oral Gel 15 Gram(s) Oral once PRN  digoxin  Injectable 250 MICROGram(s) IV Push every 6 hours  glucagon  Injectable 1 milliGRAM(s) IntraMuscular once  guaiFENesin Oral Liquid (Sugar-Free) 100 milliGRAM(s) Oral every 6 hours PRN  heparin   Injectable 5500 Unit(s) IV Push every 6 hours PRN  heparin   Injectable 2500 Unit(s) IV Push every 6 hours PRN  heparin  Infusion.  Unit(s)/Hr IV Continuous <Continuous>  insulin glargine Injectable (LANTUS) 24 Unit(s) SubCutaneous at bedtime  insulin lispro (ADMELOG) corrective regimen sliding scale   SubCutaneous three times a day before meals  insulin lispro (ADMELOG) corrective regimen sliding scale   SubCutaneous at bedtime  insulin lispro Injectable (ADMELOG) 8 Unit(s) SubCutaneous three times a day before meals  pantoprazole    Tablet 40 milliGRAM(s) Oral before breakfast  polyethylene glycol 3350 17 Gram(s) Oral daily PRN  sacubitril 24 mG/valsartan 26 mG 1 Tablet(s) Oral two times a day  senna 2 Tablet(s) Oral at bedtime  tamsulosin 0.4 milliGRAM(s) Oral at bedtime      Vitals:  T(F): 98 (10-28), Max: 98.4 (10-27)  HR: 124 (10-28) (122 - 125)  BP: 110/74 (10-28) (88/57 - 110/74)  RR: 18 (10-28)  SpO2: 90% (10-28)  I&O's Summary    27 Oct 2023 07:01  -  28 Oct 2023 07:00  --------------------------------------------------------  IN: 1565 mL / OUT: 430 mL / NET: 1135 mL        Physical Exam:  Appearance: No acute distress; well appearing,  Eyes: PERRL, EOMI, pink conjunctiva  HEENT: Normal oral mucosa  Cardiovascular: RRR, S1, S2, no murmurs, rubs, or gallops; no edema; JVD to mid neck   Respiratory: Mild crackles in the bases  Gastrointestinal: soft, non-tender, non-distended with normal bowel sounds  Musculoskeletal: No clubbing; no joint deformity   Neurologic: Non-focal  Lymphatic: No lymphadenopathy  Psychiatry: Mildly confused, AAO x2-3, no FND   Skin: No rashes, ecchymoses, or cyanosis                        11.8   10.38 )-----------( 210      ( 28 Oct 2023 06:35 )             37.7     10-28    137  |  101  |  44<H>  ----------------------------<  192<H>  4.1   |  21<L>  |  1.74<H>    Ca    8.8      28 Oct 2023 06:35    TPro  6.4  /  Alb  3.2<L>  /  TBili  0.6  /  DBili  x   /  AST  14  /  ALT  28  /  AlkPhos  66  10-28    PTT - ( 28 Oct 2023 06:35 )  PTT:86.2 sec     Patient seen and examined at bedside.    Overnight Events: CTH showing acute vs subacute infarct. Neuro following recommending MRI. PT still AAO x1. HR still 120s on dig. Giving bumex 3x1 and checking proBNP and LA    Review of Systems: Negative except as per HPI     Current Meds:  acetaminophen     Tablet .. 650 milliGRAM(s) Oral every 6 hours PRN  albuterol    90 MICROgram(s) HFA Inhaler 2 Puff(s) Inhalation every 6 hours PRN  aspirin enteric coated 81 milliGRAM(s) Oral daily  atorvastatin 80 milliGRAM(s) Oral at bedtime  benzocaine/menthol Lozenge 1 Lozenge Oral every 2 hours PRN  buMETAnide 2 milliGRAM(s) Oral daily  chlorhexidine 2% Cloths 1 Application(s) Topical daily  dapagliflozin 10 milliGRAM(s) Oral every 24 hours  dextrose 5%. 1000 milliLiter(s) IV Continuous <Continuous>  dextrose 5%. 1000 milliLiter(s) IV Continuous <Continuous>  dextrose 50% Injectable 12.5 Gram(s) IV Push once  dextrose 50% Injectable 25 Gram(s) IV Push once  dextrose 50% Injectable 25 Gram(s) IV Push once  dextrose Oral Gel 15 Gram(s) Oral once PRN  digoxin  Injectable 250 MICROGram(s) IV Push every 6 hours  glucagon  Injectable 1 milliGRAM(s) IntraMuscular once  guaiFENesin Oral Liquid (Sugar-Free) 100 milliGRAM(s) Oral every 6 hours PRN  heparin   Injectable 5500 Unit(s) IV Push every 6 hours PRN  heparin   Injectable 2500 Unit(s) IV Push every 6 hours PRN  heparin  Infusion.  Unit(s)/Hr IV Continuous <Continuous>  insulin glargine Injectable (LANTUS) 24 Unit(s) SubCutaneous at bedtime  insulin lispro (ADMELOG) corrective regimen sliding scale   SubCutaneous three times a day before meals  insulin lispro (ADMELOG) corrective regimen sliding scale   SubCutaneous at bedtime  insulin lispro Injectable (ADMELOG) 8 Unit(s) SubCutaneous three times a day before meals  pantoprazole    Tablet 40 milliGRAM(s) Oral before breakfast  polyethylene glycol 3350 17 Gram(s) Oral daily PRN  sacubitril 24 mG/valsartan 26 mG 1 Tablet(s) Oral two times a day  senna 2 Tablet(s) Oral at bedtime  tamsulosin 0.4 milliGRAM(s) Oral at bedtime      Vitals:  T(F): 98 (10-28), Max: 98.4 (10-27)  HR: 124 (10-28) (122 - 125)  BP: 110/74 (10-28) (88/57 - 110/74)  RR: 18 (10-28)  SpO2: 90% (10-28)  I&O's Summary    27 Oct 2023 07:01  -  28 Oct 2023 07:00  --------------------------------------------------------  IN: 1565 mL / OUT: 430 mL / NET: 1135 mL        Physical Exam:  Appearance: No acute distress; well appearing,  Eyes: PERRL, EOMI, pink conjunctiva  HEENT: Normal oral mucosa  Cardiovascular: RRR, S1, S2, no murmurs, rubs, or gallops; no edema; JVD to mid neck   Respiratory: Mild crackles in the bases  Gastrointestinal: soft, non-tender, non-distended with normal bowel sounds  Musculoskeletal: No clubbing; no joint deformity   Neurologic: Non-focal  Lymphatic: No lymphadenopathy  Psychiatry: Mildly confused, AAO x1, no FND   Skin: No rashes, ecchymoses, or cyanosis                        11.8   10.38 )-----------( 210      ( 28 Oct 2023 06:35 )             37.7     10-28    137  |  101  |  44<H>  ----------------------------<  192<H>  4.1   |  21<L>  |  1.74<H>    Ca    8.8      28 Oct 2023 06:35    TPro  6.4  /  Alb  3.2<L>  /  TBili  0.6  /  DBili  x   /  AST  14  /  ALT  28  /  AlkPhos  66  10-28    PTT - ( 28 Oct 2023 06:35 )  PTT:86.2 sec

## 2023-10-28 NOTE — CHART NOTE - NSCHARTNOTEFT_GEN_A_CORE
MEDICINE NP    NIRU, OTERO  58y Male    Patient is a 58y old  Male who presents with a chief complaint of NSTEMI (27 Oct 2023 10:51)         > Event Summary:   Follow-up CTH with cerebellar infarct acute vs subacute  D/w Neurology, Dr. Irving and recommends MR Head, MRA H&N  -Patient with LAURA, non-contrast ordered  -Day team to call Cardiology for ICD compatibility  -Will endorse to Day Provider in AM and Attending to follow      < from: CT Head No Cont (10.27.23 @ 18:04) >  IMPRESSION: Atrophy and small vessel white matter ischemic changes. Right   medial cerebellar infarct may be acute versus subacute. No hemorrhage.     SAYDA Alexander-BC  Medicine Department

## 2023-10-28 NOTE — PROGRESS NOTE ADULT - PROBLEM SELECTOR PLAN 6
A1c 10/14 8.4%  On Lantus 30u qhs + lispro 10u premeal + ISS  FS have been borderline- 74 this am- reduced to 24-8/8/8- monitor.

## 2023-10-28 NOTE — PROGRESS NOTE ADULT - ASSESSMENT
57 yo Male pmhx CVA with mild left sided deficits, HTN, DM2, vertigo, HLD, Mobitz II s/pp [employer] dual chamber ICD (22) initially presented to [facility] from home with complaints of dyspnea and chest pain and was admitted w/ acute hypoxic respiratory failure likely due to acute pulmonary edema due to acute HFrEF in setting of acute NSTEMI, LAURA and enterovirus infection.    Pt with brief MICU stay at [facility] requiring bipap (no intubation), was treated for PNA with cefepime, and was found to have TTE done 23 showing EF 20% with severely decreased LVSF, multiple regional wall motion abnormalities, and elevated LV end diastolic pressure. Pt was transferred to [facility] for cardiac cath evaluation.     S/p LHC on 10/16 w/ 3v disease and RHC on 10/19 for hemodynamic assessment- showed CVP of 10, PCWP 29 and CI of 2.3, started empirically on  for shortness of breath but repeat RHC with almost identical numbers now. Admitted to CICU for CABG eval vs. advanced therapy vs. high risk PCI. Deemed to be too high risk for CABG and given hydralazine for afterload reduction and diuresis with bumex. Heart Failure team following and currently holding off on advanced therapy discussion.     Pt now transferred to the floor. Pending Heart Failure recs regarding PCI vs further intervention--> Recommend EP for upgrade of device. EP called for consult--> recommend no indication for upgrade at this time.     As per HF: Spoke to Dr. Puja Santoro regarding possible revascularization. Pt is high risk and would likely need MCS support for his intervention. Last TTE showed c/f possible LVT, will re-order today and then talk to pt and his wife about AT eval. Stopping ISDN/Hydral, starting entresto -.    Patient with CTH showing acute vs subacute infarct. Neurology on board--> MRI and MRI Hand neck without contrast pending(LAURA). Heart failure recommend 3mg IV Bumex, Lactate, proBNP. BP is soft, will monitor prior to giving extra dose of Bumex.

## 2023-10-28 NOTE — CHART NOTE - NSCHARTNOTEFT_GEN_A_CORE
CICU Accept Note    CHIEF COMPLAINT:     HPI / INTERVAL HISTORY: 59 yo Male pmhx CVA with mild left sided deficits, HTN, DM2, vertigo, HLD, Mobitz II s/pp Medtronic dual chamber ICD (22) initially presented to [facility] from home with complaints of dyspnea and chest pain and was admitted w/ acute hypoxic respiratory failure likely due to acute pulmonary edema due to acute HFrEF in setting of acute NSTEMI, LAURA and enterovirus infection. Pt with brief MICU stay at [facility] requiring bipap (no intubation), was treated for PNA with cefepime, and was found to have TTE done 23 showing EF 20% with severely decreased LVSF, multiple regional wall motion abnormalities, and elevated LV end diastolic pressure. Pt was transferred to [facility] for cardiac cath evaluation. Patient without any acute complaints, complaining of intermittent palpitations for the last 2 days. No chest pain, SOB, fevers, nausea, vomiting, abdominal pain.    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 cMRI revealed limited LAD viability. RHC on 10/19 for hemodynamic assessment showed RA 10, PCWP 20, CO 4.4, CI 2.3. On 10/20, noted to be cold, clammy, altered after receiving toprol c/f borderline cardiogenic shock, admitted to CICU on 10/20 for CABG vs. Advanced therapy vs. high risk PCI evaluation. Deemed to be too high risk for CABG and was stable with hydralazine for afterload reduction and diuresis with bumex. Heart Failure team following.    Per chart review, pt disoriented today and unable to provide year, location, month. Pt w/ persistent tachycardia, renal dysfunction, hypotension and today, and elevated lactate. Transferred to CCU for inotropic support and hemodynamic monitoring.        PAST MEDICAL & SURGICAL HISTORY:  CVA (cerebrovascular accident)      T2DM (type 2 diabetes mellitus)      HTN (hypertension)      HLD (hyperlipidemia)      S/P cystoscopy      S/P hernia repair        HOME MEDICATIONS:    Allergies    No Known Allergies    Intolerances        REVIEW OF SYSTEMS:  Constitutional: No fevers, chills, weight loss, weight gain  HEENT: No vision problems, eye pain, nasal congestion, rhinorrhea, sore throat, dysphagia  CV: No chest pain, orthopnea, palpitations  Resp: No cough, dyspnea, wheezing, hemoptysis  GI: No nausea, vomiting, diarrhea, constipation, abdominal pain  : [ ] dysuria [ ] nocturia [ ] hematuria [ ] increased urinary frequency  Musculoskeletal: [ ] back pain [ ] myalgias [ ] arthralgias [ ] fracture  Skin: [ ] rash [ ] itch  Neurological: [ ] headache [ ] dizziness [ ] syncope [ ] weakness [ ] numbness  Psychiatric: [ ] anxiety [ ] depression  Endocrine: [ ] diabetes [ ] thyroid problem  Hematologic/Lymphatic: [ ] anemia [ ] bleeding problem  Allergic/Immunologic: [ ] itchy eyes [ ] nasal discharge [ ] hives [ ] angioedema  [ ] All other systems negative  [ ] Unable to assess ROS because *****    OBJECTIVE:  ICU Vital Signs Last 24 Hrs  T(C): 36.6 (28 Oct 2023 13:08), Max: 36.7 (27 Oct 2023 19:16)  T(F): 97.9 (28 Oct 2023 13:08), Max: 98.1 (27 Oct 2023 19:16)  HR: 125 (28 Oct 2023 13:08) (123 - 125)  BP: 89/56 (28 Oct 2023 13:08) (89/56 - 110/74)  BP(mean): --  ABP: --  ABP(mean): --  RR: 18 (28 Oct 2023 13:08) (18 - 20)  SpO2: 97% (28 Oct 2023 13:08) (90% - 97%)    O2 Parameters below as of 28 Oct 2023 05:21  Patient On (Oxygen Delivery Method): room air                10-27 @ 07:01  -  10-28 @ 07:00  --------------------------------------------------------  IN: 1565 mL / OUT: 430 mL / NET: 1135 mL    10-28 @ :01  -  10-28 @ 16:09  --------------------------------------------------------  IN: 240 mL / OUT: 0 mL / NET: 240 mL        Standing Lasix Dose: *****    Ideal body weight:   Vent Mode:  Rate:  TV: ***** ml/kg  PEEP:  Fio2:   Plateau Pressure:   Driving Pressure:   Vent Trends:    PHYSICAL EXAM:  General: Intubated/sedaed. Alert. Following commands? *****  HEENT: ET tube *****. No ulcerations, epistaxis, or signs of infection. Pupils constricted, ERRL. No scleral icterus or conjunctival pallor.  Neck: No JVD. Supple. No bruising or ecchymosis.  Chest/Lungs: CTAB, no wheezes, rhonchi, or rales. Normal excursion.   Heart: Regular rate and regular rhythm. No murmurs appreciated. *****.   Abdomen: Soft, non tender to palaption. No distension.   Extremities/skin: No significant extremity edema. Hands cool to touch. Cap refill < 2 sec. No unilateral leg swelling ****  Neuro: Following commands. Moving extremities spontaneously.  Psych: Sedated, appropriate mood *****    LINES:   - Location, insertion date, redness, indication?  - Douglass insertion date and last change?    HOSPITAL MEDICATIONS:  MEDICATIONS  (STANDING):  aspirin enteric coated 81 milliGRAM(s) Oral daily  atorvastatin 80 milliGRAM(s) Oral at bedtime  buMETAnide 2 milliGRAM(s) Oral daily  chlorhexidine 2% Cloths 1 Application(s) Topical daily  dapagliflozin 10 milliGRAM(s) Oral every 24 hours  dextrose 5%. 1000 milliLiter(s) (100 mL/Hr) IV Continuous <Continuous>  dextrose 5%. 1000 milliLiter(s) (50 mL/Hr) IV Continuous <Continuous>  dextrose 50% Injectable 12.5 Gram(s) IV Push once  dextrose 50% Injectable 25 Gram(s) IV Push once  dextrose 50% Injectable 25 Gram(s) IV Push once  glucagon  Injectable 1 milliGRAM(s) IntraMuscular once  heparin  Infusion.  Unit(s)/Hr (12 mL/Hr) IV Continuous <Continuous>  insulin glargine Injectable (LANTUS) 24 Unit(s) SubCutaneous at bedtime  insulin lispro (ADMELOG) corrective regimen sliding scale   SubCutaneous three times a day before meals  insulin lispro (ADMELOG) corrective regimen sliding scale   SubCutaneous at bedtime  insulin lispro Injectable (ADMELOG) 8 Unit(s) SubCutaneous three times a day before meals  pantoprazole    Tablet 40 milliGRAM(s) Oral before breakfast  sacubitril 24 mG/valsartan 26 mG 1 Tablet(s) Oral two times a day  senna 2 Tablet(s) Oral at bedtime  tamsulosin 0.4 milliGRAM(s) Oral at bedtime    MEDICATIONS  (PRN):  acetaminophen     Tablet .. 650 milliGRAM(s) Oral every 6 hours PRN Temp greater or equal to 38C (100.4F), Mild Pain (1 - 3)  albuterol    90 MICROgram(s) HFA Inhaler 2 Puff(s) Inhalation every 6 hours PRN Shortness of Breath and/or Wheezing  benzocaine/menthol Lozenge 1 Lozenge Oral every 2 hours PRN Sore Throat  dextrose Oral Gel 15 Gram(s) Oral once PRN Blood Glucose LESS THAN 70 milliGRAM(s)/deciliter  guaiFENesin Oral Liquid (Sugar-Free) 100 milliGRAM(s) Oral every 6 hours PRN Cough  heparin   Injectable 2500 Unit(s) IV Push every 6 hours PRN For aPTT between 40 - 57  heparin   Injectable 5500 Unit(s) IV Push every 6 hours PRN For aPTT less than 40  polyethylene glycol 3350 17 Gram(s) Oral daily PRN Constipation      Tube Feed rate and goal:   Stress ulcer prophylaxis:   Last BM:   GI motility meications:     LABS:                        11.8   10.38 )-----------( 210      ( 28 Oct 2023 06:35 )             37.7     Hgb Trend: 11.8<--, 12.0<--, 11.7<--, 12.1<--, 13.0<--  10    137  |  101  |  44<H>  ----------------------------<  192<H>  4.1   |  21<L>  |  1.74<H>    Ca    8.8      28 Oct 2023 06:35    TPro  6.4  /  Alb  3.2<L>  /  TBili  0.6  /  DBili  x   /  AST  14  /  ALT  28  /  AlkPhos  66  10-28    Creatinine Trend: 1.74<--, 1.69<--, 1.80<--, 1.92<--, 1.78<--, 1.78<--  PTT - ( 28 Oct 2023 13:34 )  PTT:62.6 sec  Urinalysis Basic - ( 28 Oct 2023 06:35 )    Color: x / Appearance: x / SG: x / pH: x  Gluc: 192 mg/dL / Ketone: x  / Bili: x / Urobili: x   Blood: x / Protein: x / Nitrite: x   Leuk Esterase: x / RBC: x / WBC x   Sq Epi: x / Non Sq Epi: x / Bacteria: x            MICROBIOLOGY:     RADIOLOGY & ADDITIONAL TESTS:      ASSESSMENT AND PLAN:      NEUROLOGICAL  #Encephalopathy   Baseline A&Ox3, lately A&Ox2 (not oriented to time). Likely iso hypoperfusion iso acute on chronic HF vs. metabolic vs. CVA  -CT head 10/27 w/ atrophy and small vessel white matter ischemic changes. R medial cerebellar infarct may be acute vs subacute  -start inotropic support  -check blood gas  -neurology following:   -c/w asa and statin   -c/w heparin gtt for low EF and LV thrombus   -MRI brain and MRA H/N when stable  -b12, TSH, RPR for reversible causes of dementia     #Hx of CVA w/ mild L residual deficit  -unclear if imaging finding acute vs subacute  -plan as above    CARDIOVASCULAR  #Hypotension  Likely iso acute on chronic HFrEF  ECHO 10/16: EF 20%, severely reduced LVSF  C 10/16: 95% pLAD, 80% mid and distal LCx, 90% mid and distal RCA  RHC 10/19: RA 10, PCWP 20, CO 4.4, CI 2.3  cMR 10/18: limited viability in LAD territory  -start   -consider swan placement to confirm/r/o impending cardiogenic shock  -trend lactate, proBNP  -c/w bumex 2 mg PO qd, for UO goal net neg 2-3L  -presently net positive, consider further bumex as BP allows  -s/p bumex 3 mg IV x1  -pending HF recs regarding high risk PCI vs further intervention; EP recommended no indication for upgrade at this time. HF holding off on launching VAD/transplant eval  -hold Entresto iso hypotension  -off hydral/nitrates  -s/p dig load; check dig level tomorrow  -avoid BB/CCB given borderline cardiac function  -start GDMT when appropriate    #Tachycardia  Mobitz II s/p AICD  - Likely reflex tachycardiac iso increased cardiac demand/ hypoxia vs. iso infxn  - s/p dig load, check level jeffrey  - ctm  - per EP, no need to upgrade device at this time  - tele    #HLD  - c/w Atorv 80mg     RESPIRATORY  #Pulmonary edema  Likely iso CHF vs. superimposed PNA  CT chest 10/28: scattered patchy and nodular bilateral upper lobe predominant groundglass opacities with interlobular septal thickening representing pulmonary edema  -c/w diuresis for net neg goal  -infectious cause not excluded, low threshold to add abx if leukocytosis or febrile, as pt persistently tachy  -f/u noncontrast CT chest in 1-3 month to ensure resolution    GI/NUTRITION  No active issues  - CC DASH Halal diet  - GI ppx: PPI 40mg daily  - c/w Bowel regimen    /RENAL  #LAURA:   sCr on admission , uptrending likely iso CHF  -Trend sCr  -Monitor I/O  -c/w Bumex   -Avoid NSAIDs, ACEI/ARBS, RCA and nephrotoxins  -Renally dose medications    #Elevated lactate  - uptrending lactate noted likely iso increased cardiac demand/CHF  - monitor VS  - trend lactate  - plan as above    #BPH  - c/w Flomax 0.4mg qhs    SKIN  - no active issues    INFECTIOUS DISEASE  - No active issues  - plan for pulmonary edema as above  - Recent admission to OSH for ?PNA s/p 14 days of cefepime (last dose 10/14)  - Currently no signs of infection, WBC normal, afebrile, although monitor iso persistent tachycardia    ENDOCRINE    #DM2  - A1c 10/14 8.4%  - c/w Lantus 24u qhs + lispro 8u premeal + ISS  - trend FS, will likely need adjustment    #Thyroid nodule  CT chest: 3. 3 x 1.8 cm left supraclavicular nodule likely arising from the thyroid gland   -thyroid US outpatient    HEMATOLOGIC/DVT PPX  - No active issues  - DVT ppx: HSQ     #ETHICS  Full code? CICU Accept Note    CHIEF COMPLAINT:     HPI / INTERVAL HISTORY: 57 yo Male pmhx CVA with mild left sided deficits, HTN, DM2, vertigo, HLD, Mobitz II s/pp Medtronic dual chamber ICD (22) initially presented to [facility] from home with complaints of dyspnea and chest pain and was admitted w/ acute hypoxic respiratory failure likely due to acute pulmonary edema due to acute HFrEF in setting of acute NSTEMI, LAURA and enterovirus infection. Pt with brief MICU stay at [facility] requiring bipap (no intubation), was treated for PNA with cefepime, and was found to have TTE done 23 showing EF 20% with severely decreased LVSF, multiple regional wall motion abnormalities, and elevated LV end diastolic pressure. Pt was transferred to [facility] for cardiac cath evaluation. Patient without any acute complaints, complaining of intermittent palpitations for the last 2 days. No chest pain, SOB, fevers, nausea, vomiting, abdominal pain.    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 cMRI revealed limited LAD viability. RHC on 10/19 for hemodynamic assessment showed RA 10, PCWP 20, CO 4.4, CI 2.3. On 10/20, noted to be cold, clammy, altered after receiving toprol c/f borderline cardiogenic shock, admitted to CICU on 10/20 for CABG vs. Advanced therapy vs. high risk PCI evaluation. Deemed to be too high risk for CABG and was stable with hydralazine for afterload reduction and diuresis with bumex. Heart Failure team following. Pending discussion regarding high risk PCI vs further intervention; EP recommended no indication for upgrade at this time. No plan for VAD/transplant eval at this time.    Per chart review, pt disoriented today and unable to provide year, location, month. Pt w/ persistent tachycardia, renal dysfunction, hypotension and today, and elevated lactate. Transferred to CCU for inotropic support and hemodynamic monitoring.        PAST MEDICAL & SURGICAL HISTORY:  CVA (cerebrovascular accident)      T2DM (type 2 diabetes mellitus)      HTN (hypertension)      HLD (hyperlipidemia)      S/P cystoscopy      S/P hernia repair        HOME MEDICATIONS:    Allergies    No Known Allergies    Intolerances        REVIEW OF SYSTEMS:  Constitutional: No fevers, chills, weight loss, weight gain  HEENT: No vision problems, eye pain, nasal congestion, rhinorrhea, sore throat, dysphagia  CV: No chest pain, orthopnea, palpitations  Resp: No cough, dyspnea, wheezing, hemoptysis  GI: No nausea, vomiting, diarrhea, constipation, abdominal pain  : [ ] dysuria [ ] nocturia [ ] hematuria [ ] increased urinary frequency  Musculoskeletal: [ ] back pain [ ] myalgias [ ] arthralgias [ ] fracture  Skin: [ ] rash [ ] itch  Neurological: [ ] headache [ ] dizziness [ ] syncope [ ] weakness [ ] numbness  Psychiatric: [ ] anxiety [ ] depression  Endocrine: [ ] diabetes [ ] thyroid problem  Hematologic/Lymphatic: [ ] anemia [ ] bleeding problem  Allergic/Immunologic: [ ] itchy eyes [ ] nasal discharge [ ] hives [ ] angioedema  [ ] All other systems negative  [ ] Unable to assess ROS because *****    OBJECTIVE:  ICU Vital Signs Last 24 Hrs  T(C): 36.6 (28 Oct 2023 13:08), Max: 36.7 (27 Oct 2023 19:16)  T(F): 97.9 (28 Oct 2023 13:08), Max: 98.1 (27 Oct 2023 19:16)  HR: 125 (28 Oct 2023 13:08) (123 - 125)  BP: 89/56 (28 Oct 2023 13:08) (89/56 - 110/74)  BP(mean): --  ABP: --  ABP(mean): --  RR: 18 (28 Oct 2023 13:08) (18 - 20)  SpO2: 97% (28 Oct 2023 13:08) (90% - 97%)    O2 Parameters below as of 28 Oct 2023 05:21  Patient On (Oxygen Delivery Method): room air                10-27 @ :  -  10-28 @ 07:00  --------------------------------------------------------  IN: 1565 mL / OUT: 430 mL / NET: 1135 mL    10-28 @ 07:  -  10-28 @ 16:09  --------------------------------------------------------  IN: 240 mL / OUT: 0 mL / NET: 240 mL        Standing Lasix Dose: *****    Ideal body weight:   Vent Mode:  Rate:  TV: ***** ml/kg  PEEP:  Fio2:   Plateau Pressure:   Driving Pressure:   Vent Trends:    PHYSICAL EXAM:  General: Intubated/sedaed. Alert. Following commands? *****  HEENT: ET tube *****. No ulcerations, epistaxis, or signs of infection. Pupils constricted, ERRL. No scleral icterus or conjunctival pallor.  Neck: No JVD. Supple. No bruising or ecchymosis.  Chest/Lungs: CTAB, no wheezes, rhonchi, or rales. Normal excursion.   Heart: Regular rate and regular rhythm. No murmurs appreciated. *****.   Abdomen: Soft, non tender to palaption. No distension.   Extremities/skin: No significant extremity edema. Hands cool to touch. Cap refill < 2 sec. No unilateral leg swelling ****  Neuro: Following commands. Moving extremities spontaneously.  Psych: Sedated, appropriate mood *****    LINES:   - Location, insertion date, redness, indication?  - Douglass insertion date and last change?    HOSPITAL MEDICATIONS:  MEDICATIONS  (STANDING):  aspirin enteric coated 81 milliGRAM(s) Oral daily  atorvastatin 80 milliGRAM(s) Oral at bedtime  buMETAnide 2 milliGRAM(s) Oral daily  chlorhexidine 2% Cloths 1 Application(s) Topical daily  dapagliflozin 10 milliGRAM(s) Oral every 24 hours  dextrose 5%. 1000 milliLiter(s) (100 mL/Hr) IV Continuous <Continuous>  dextrose 5%. 1000 milliLiter(s) (50 mL/Hr) IV Continuous <Continuous>  dextrose 50% Injectable 12.5 Gram(s) IV Push once  dextrose 50% Injectable 25 Gram(s) IV Push once  dextrose 50% Injectable 25 Gram(s) IV Push once  glucagon  Injectable 1 milliGRAM(s) IntraMuscular once  heparin  Infusion.  Unit(s)/Hr (12 mL/Hr) IV Continuous <Continuous>  insulin glargine Injectable (LANTUS) 24 Unit(s) SubCutaneous at bedtime  insulin lispro (ADMELOG) corrective regimen sliding scale   SubCutaneous three times a day before meals  insulin lispro (ADMELOG) corrective regimen sliding scale   SubCutaneous at bedtime  insulin lispro Injectable (ADMELOG) 8 Unit(s) SubCutaneous three times a day before meals  pantoprazole    Tablet 40 milliGRAM(s) Oral before breakfast  sacubitril 24 mG/valsartan 26 mG 1 Tablet(s) Oral two times a day  senna 2 Tablet(s) Oral at bedtime  tamsulosin 0.4 milliGRAM(s) Oral at bedtime    MEDICATIONS  (PRN):  acetaminophen     Tablet .. 650 milliGRAM(s) Oral every 6 hours PRN Temp greater or equal to 38C (100.4F), Mild Pain (1 - 3)  albuterol    90 MICROgram(s) HFA Inhaler 2 Puff(s) Inhalation every 6 hours PRN Shortness of Breath and/or Wheezing  benzocaine/menthol Lozenge 1 Lozenge Oral every 2 hours PRN Sore Throat  dextrose Oral Gel 15 Gram(s) Oral once PRN Blood Glucose LESS THAN 70 milliGRAM(s)/deciliter  guaiFENesin Oral Liquid (Sugar-Free) 100 milliGRAM(s) Oral every 6 hours PRN Cough  heparin   Injectable 2500 Unit(s) IV Push every 6 hours PRN For aPTT between 40 - 57  heparin   Injectable 5500 Unit(s) IV Push every 6 hours PRN For aPTT less than 40  polyethylene glycol 3350 17 Gram(s) Oral daily PRN Constipation      Tube Feed rate and goal:   Stress ulcer prophylaxis:   Last BM:   GI motility meications:     LABS:                        11.8   10.38 )-----------( 210      ( 28 Oct 2023 06:35 )             37.7     Hgb Trend: 11.8<--, 12.0<--, 11.7<--, 12.1<--, 13.0<--  10-28    137  |  101  |  44<H>  ----------------------------<  192<H>  4.1   |  21<L>  |  1.74<H>    Ca    8.8      28 Oct 2023 06:35    TPro  6.4  /  Alb  3.2<L>  /  TBili  0.6  /  DBili  x   /  AST  14  /  ALT  28  /  AlkPhos  66  10-    Creatinine Trend: 1.74<--, 1.69<--, 1.80<--, 1.92<--, 1.78<--, 1.78<--  PTT - ( 28 Oct 2023 13:34 )  PTT:62.6 sec  Urinalysis Basic - ( 28 Oct 2023 06:35 )    Color: x / Appearance: x / SG: x / pH: x  Gluc: 192 mg/dL / Ketone: x  / Bili: x / Urobili: x   Blood: x / Protein: x / Nitrite: x   Leuk Esterase: x / RBC: x / WBC x   Sq Epi: x / Non Sq Epi: x / Bacteria: x            MICROBIOLOGY:     RADIOLOGY & ADDITIONAL TESTS:      ASSESSMENT AND PLAN:      NEUROLOGICAL  #Encephalopathy   Baseline A&Ox3, lately A&Ox2 (not oriented to time). Likely iso hypoperfusion iso acute on chronic HF vs. metabolic vs. CVA  -CT head 10/27 w/ atrophy and small vessel white matter ischemic changes. R medial cerebellar infarct may be acute vs subacute  -start inotropic support  -check blood gas  -neurology following:   -c/w asa and statin   -c/w heparin gtt for low EF and LV thrombus   -MRI brain and MRA H/N when stable  -b12, TSH, RPR for reversible causes of dementia     #Hx of CVA w/ mild L residual deficit  -unclear if imaging finding acute vs subacute  -plan as above    CARDIOVASCULAR  #Hypotension  Likely iso acute on chronic HFrEF  ECHO 10/16: EF 20%, severely reduced LVSF  LHC 10/16: 95% pLAD, 80% mid and distal LCx, 90% mid and distal RCA  RHC 10/19: RA 10, PCWP 20, CO 4.4, CI 2.3  cMR 10/18: limited viability in LAD territory  -start   -consider swan placement to confirm/r/o impending cardiogenic shock  -trend lactate, proBNP  -c/w bumex 2 mg PO qd, for UO goal net neg 2-3L  -presently net positive, consider further bumex as BP allows  -s/p bumex 3 mg IV x1  -pending HF recs regarding high risk PCI vs further intervention; EP recommended no indication for upgrade at this time. HF holding off on launching VAD/transplant eval  -hold Entresto iso hypotension  -off hydral/nitrates  -s/p dig load; check dig level tomorrow  -avoid BB/CCB given borderline cardiac function  -start GDMT when appropriate    #Tachycardia  Mobitz II s/p AICD  - Likely reflex tachycardiac iso increased cardiac demand/ hypoxia vs. iso infxn  - s/p dig load, check level jeffrey  - ctm  - per EP, no need to upgrade device at this time  - tele    #HLD  - c/w Atorv 80mg     RESPIRATORY  #Pulmonary edema  Likely iso CHF vs. superimposed PNA  CT chest 10/28: scattered patchy and nodular bilateral upper lobe predominant groundglass opacities with interlobular septal thickening representing pulmonary edema  -c/w diuresis for net neg goal  -infectious cause not excluded, low threshold to add abx if leukocytosis or febrile, as pt persistently tachy  -f/u noncontrast CT chest in 1-3 month to ensure resolution    GI/NUTRITION  No active issues  - CC DASH Halal diet  - GI ppx: PPI 40mg daily  - c/w Bowel regimen    /RENAL  #LAURA:   sCr on admission 1.28, uptrending likely iso CHF  -Trend sCr  -Monitor I/O  -c/w Bumex   -Avoid NSAIDs, ACEI/ARBS, RCA and nephrotoxins  -Renally dose medications    #Elevated lactate  - uptrending lactate noted likely iso increased cardiac demand/CHF  - monitor VS  - trend lactate  - plan as above    #BPH  - c/w Flomax 0.4mg qhs    SKIN  - no active issues    INFECTIOUS DISEASE  - No active issues  - plan for pulmonary edema as above  - Recent admission to OSH for ?PNA s/p 14 days of cefepime (last dose 10/14)  - Currently no signs of infection, WBC normal, afebrile, although monitor iso persistent tachycardia    ENDOCRINE    #DM2  - A1c 10/14 8.4%  - c/w Lantus 24u qhs + lispro 8u premeal + ISS  - trend FS, will likely need adjustment    #Thyroid nodule  CT chest: 3. 3 x 1.8 cm left supraclavicular nodule likely arising from the thyroid gland   -thyroid US outpatient    HEMATOLOGIC/DVT PPX  - No active issues  - DVT ppx: HSQ     #ETHICS  Full code? CICU Accept Note    CHIEF COMPLAINT:     HPI / INTERVAL HISTORY: 57 yo Male pmhx CVA with mild left sided deficits, HTN, DM2, vertigo, HLD, Mobitz II s/pp Medtronic dual chamber ICD (12/21/22) initially presented to [facility] from home with complaints of dyspnea and chest pain and was admitted w/ acute hypoxic respiratory failure likely due to acute pulmonary edema due to acute HFrEF in setting of acute NSTEMI, LAURA and enterovirus infection. Pt with brief MICU stay at [facility] requiring bipap (no intubation), was treated for PNA with cefepime, and was found to have TTE done 9/26/23 showing EF 20% with severely decreased LVSF, multiple regional wall motion abnormalities, and elevated LV end diastolic pressure. Pt was transferred to [facility] for cardiac cath evaluation. Patient without any acute complaints, complaining of intermittent palpitations for the last 2 days. No chest pain, SOB, fevers, nausea, vomiting, abdominal pain.    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 cMRI revealed limited LAD viability. RHC on 10/19 for hemodynamic assessment showed RA 10, PCWP 20, CO 4.4, CI 2.3. On 10/20, noted to be cold, clammy, altered after receiving toprol c/f borderline cardiogenic shock, admitted to CICU on 10/20 for CABG vs. Advanced therapy vs. high risk PCI evaluation. Deemed to be too high risk for CABG and was stable with hydralazine for afterload reduction and diuresis with bumex. Heart Failure team following. Pending discussion regarding high risk PCI vs further intervention; EP recommended no indication for upgrade at this time. No plan for VAD/transplant eval at this time.    On limited TTE 10/26, EF <20 %, LV apical thrombus identified. Per chart review, pt disoriented today and unable to provide year, location, month. Pt w/ persistent tachycardia, renal dysfunction, hypotension and today, elevated lactate. Transferred to CCU for inotropic support and hemodynamic monitoring.        PAST MEDICAL & SURGICAL HISTORY:  CVA (cerebrovascular accident)      T2DM (type 2 diabetes mellitus)      HTN (hypertension)      HLD (hyperlipidemia)      S/P cystoscopy      S/P hernia repair        HOME MEDICATIONS:    Allergies    No Known Allergies    Intolerances        REVIEW OF SYSTEMS:  Constitutional: No fevers, chills, weight loss, weight gain  HEENT: No vision problems, eye pain, nasal congestion, rhinorrhea, sore throat, dysphagia  CV: No chest pain, orthopnea, palpitations  Resp: No cough, dyspnea, wheezing, hemoptysis  GI: No nausea, vomiting, diarrhea, constipation, abdominal pain  : [ ] dysuria [ ] nocturia [ ] hematuria [ ] increased urinary frequency  Musculoskeletal: [ ] back pain [ ] myalgias [ ] arthralgias [ ] fracture  Skin: [ ] rash [ ] itch  Neurological: [ ] headache [ ] dizziness [ ] syncope [ ] weakness [ ] numbness  Psychiatric: [ ] anxiety [ ] depression  Endocrine: [ ] diabetes [ ] thyroid problem  Hematologic/Lymphatic: [ ] anemia [ ] bleeding problem  Allergic/Immunologic: [ ] itchy eyes [ ] nasal discharge [ ] hives [ ] angioedema  [ ] All other systems negative  [ ] Unable to assess ROS because *****    OBJECTIVE:  ICU Vital Signs Last 24 Hrs  T(C): 36.6 (28 Oct 2023 13:08), Max: 36.7 (27 Oct 2023 19:16)  T(F): 97.9 (28 Oct 2023 13:08), Max: 98.1 (27 Oct 2023 19:16)  HR: 125 (28 Oct 2023 13:08) (123 - 125)  BP: 89/56 (28 Oct 2023 13:08) (89/56 - 110/74)  BP(mean): --  ABP: --  ABP(mean): --  RR: 18 (28 Oct 2023 13:08) (18 - 20)  SpO2: 97% (28 Oct 2023 13:08) (90% - 97%)    O2 Parameters below as of 28 Oct 2023 05:21  Patient On (Oxygen Delivery Method): room air                10-27 @ 07:01  -  10-28 @ 07:00  --------------------------------------------------------  IN: 1565 mL / OUT: 430 mL / NET: 1135 mL    10-28 @ 07:01  -  10-28 @ 16:09  --------------------------------------------------------  IN: 240 mL / OUT: 0 mL / NET: 240 mL        Standing Lasix Dose: *****    Ideal body weight:   Vent Mode:  Rate:  TV: ***** ml/kg  PEEP:  Fio2:   Plateau Pressure:   Driving Pressure:   Vent Trends:    PHYSICAL EXAM:  General: Intubated/sedaed. Alert. Following commands? *****  HEENT: ET tube *****. No ulcerations, epistaxis, or signs of infection. Pupils constricted, ERRL. No scleral icterus or conjunctival pallor.  Neck: No JVD. Supple. No bruising or ecchymosis.  Chest/Lungs: CTAB, no wheezes, rhonchi, or rales. Normal excursion.   Heart: Regular rate and regular rhythm. No murmurs appreciated. *****.   Abdomen: Soft, non tender to palaption. No distension.   Extremities/skin: No significant extremity edema. Hands cool to touch. Cap refill < 2 sec. No unilateral leg swelling ****  Neuro: Following commands. Moving extremities spontaneously.  Psych: Sedated, appropriate mood *****    LINES:   - Location, insertion date, redness, indication?  - Douglass insertion date and last change?    HOSPITAL MEDICATIONS:  MEDICATIONS  (STANDING):  aspirin enteric coated 81 milliGRAM(s) Oral daily  atorvastatin 80 milliGRAM(s) Oral at bedtime  buMETAnide 2 milliGRAM(s) Oral daily  chlorhexidine 2% Cloths 1 Application(s) Topical daily  dapagliflozin 10 milliGRAM(s) Oral every 24 hours  dextrose 5%. 1000 milliLiter(s) (100 mL/Hr) IV Continuous <Continuous>  dextrose 5%. 1000 milliLiter(s) (50 mL/Hr) IV Continuous <Continuous>  dextrose 50% Injectable 12.5 Gram(s) IV Push once  dextrose 50% Injectable 25 Gram(s) IV Push once  dextrose 50% Injectable 25 Gram(s) IV Push once  glucagon  Injectable 1 milliGRAM(s) IntraMuscular once  heparin  Infusion.  Unit(s)/Hr (12 mL/Hr) IV Continuous <Continuous>  insulin glargine Injectable (LANTUS) 24 Unit(s) SubCutaneous at bedtime  insulin lispro (ADMELOG) corrective regimen sliding scale   SubCutaneous three times a day before meals  insulin lispro (ADMELOG) corrective regimen sliding scale   SubCutaneous at bedtime  insulin lispro Injectable (ADMELOG) 8 Unit(s) SubCutaneous three times a day before meals  pantoprazole    Tablet 40 milliGRAM(s) Oral before breakfast  sacubitril 24 mG/valsartan 26 mG 1 Tablet(s) Oral two times a day  senna 2 Tablet(s) Oral at bedtime  tamsulosin 0.4 milliGRAM(s) Oral at bedtime    MEDICATIONS  (PRN):  acetaminophen     Tablet .. 650 milliGRAM(s) Oral every 6 hours PRN Temp greater or equal to 38C (100.4F), Mild Pain (1 - 3)  albuterol    90 MICROgram(s) HFA Inhaler 2 Puff(s) Inhalation every 6 hours PRN Shortness of Breath and/or Wheezing  benzocaine/menthol Lozenge 1 Lozenge Oral every 2 hours PRN Sore Throat  dextrose Oral Gel 15 Gram(s) Oral once PRN Blood Glucose LESS THAN 70 milliGRAM(s)/deciliter  guaiFENesin Oral Liquid (Sugar-Free) 100 milliGRAM(s) Oral every 6 hours PRN Cough  heparin   Injectable 2500 Unit(s) IV Push every 6 hours PRN For aPTT between 40 - 57  heparin   Injectable 5500 Unit(s) IV Push every 6 hours PRN For aPTT less than 40  polyethylene glycol 3350 17 Gram(s) Oral daily PRN Constipation      Tube Feed rate and goal:   Stress ulcer prophylaxis:   Last BM:   GI motility meications:     LABS:                        11.8   10.38 )-----------( 210      ( 28 Oct 2023 06:35 )             37.7     Hgb Trend: 11.8<--, 12.0<--, 11.7<--, 12.1<--, 13.0<--  10-28    137  |  101  |  44<H>  ----------------------------<  192<H>  4.1   |  21<L>  |  1.74<H>    Ca    8.8      28 Oct 2023 06:35    TPro  6.4  /  Alb  3.2<L>  /  TBili  0.6  /  DBili  x   /  AST  14  /  ALT  28  /  AlkPhos  66  10-28    Creatinine Trend: 1.74<--, 1.69<--, 1.80<--, 1.92<--, 1.78<--, 1.78<--  PTT - ( 28 Oct 2023 13:34 )  PTT:62.6 sec  Urinalysis Basic - ( 28 Oct 2023 06:35 )    Color: x / Appearance: x / SG: x / pH: x  Gluc: 192 mg/dL / Ketone: x  / Bili: x / Urobili: x   Blood: x / Protein: x / Nitrite: x   Leuk Esterase: x / RBC: x / WBC x   Sq Epi: x / Non Sq Epi: x / Bacteria: x            MICROBIOLOGY:     RADIOLOGY & ADDITIONAL TESTS:      ASSESSMENT AND PLAN:      NEUROLOGICAL  #Encephalopathy   Baseline A&Ox3, lately A&Ox2 (not oriented to time). Likely iso hypoperfusion iso acute on chronic HF vs. metabolic vs. CVA  -CT head 10/27 w/ atrophy and small vessel white matter ischemic changes. R medial cerebellar infarct may be acute vs subacute  -start inotropic support  -check blood gas  -neurology following:   -c/w asa and statin   -c/w heparin gtt for low EF and LV thrombus   -MRI brain and MRA H/N when stable  -b12, TSH, RPR for reversible causes of dementia   -neuro checks    #Hx of CVA w/ mild L residual deficit  -unclear if imaging finding acute vs subacute  -plan as above    CARDIOVASCULAR  #Hypotension/cardiogenic shock  Likely iso acute on chronic HFrEF  ECHO 10/16: EF 20%, severely reduced LVSF  LHC 10/16: 95% pLAD, 80% mid and distal LCx, 90% mid and distal RCA  RHC 10/19: RA 10, PCWP 20, CO 4.4, CI 2.3  cMR 10/18: limited viability in LAD territory  TTE 10/28: EF <20 %, LV apical thrombus is seen. Small pericardial effusion noted adjacent to the right ventricle with no evidence of hemodynamic compromise.  -start dobutamine   -consider swan placement to confirm/r/o impending cardiogenic shock  -trend lactate, proBNP  -c/w bumex 2 mg PO qd, for UO goal net neg 2-3L  -presently net positive, consider further bumex as BP allows  -s/p bumex 3 mg IV x1  -pending HF recs regarding high risk PCI vs further intervention; EP recommended no indication for upgrade at this time. HF holding off on launching VAD/transplant eval  -hold Entresto iso hypotension  -off hydral/nitrates  -s/p dig load; check dig level tomorrow  -avoid BB/CCB given borderline cardiac function  -start GDMT when appropriate    #LV apical thrombus  -c/w heparin gtt  -MRI brain and MRA H/N when stable  -neuro checks    #Tachycardia  Mobitz II s/p AICD  - Likely reflex tachycardiac iso increased cardiac demand/ hypoxia vs. iso infxn  - s/p dig load, check level jeffrey  - ctm  - per EP, no need to upgrade device at this time  - tele    #HLD  - c/w Atorv 80mg     RESPIRATORY  #Pulmonary edema  Likely iso CHF vs. superimposed PNA  CT chest 10/28: scattered patchy and nodular bilateral upper lobe predominant groundglass opacities with interlobular septal thickening representing pulmonary edema  -c/w diuresis for net neg goal  -infectious cause not excluded, low threshold to add abx if leukocytosis or febrile, as pt persistently tachy  -f/u noncontrast CT chest in 1-3 month to ensure resolution    GI/NUTRITION  No active issues  - CC DASH Halal diet  - GI ppx: PPI 40mg daily  - c/w Bowel regimen    /RENAL  #LAURA:   sCr on admission 1.28, uptrending likely iso CHF  -Trend sCr  -Monitor I/O  -c/w Bumex   -Avoid NSAIDs, ACEI/ARBS, RCA and nephrotoxins  -Renally dose medications    #Elevated lactate  - uptrending lactate noted likely iso increased cardiac demand/CHF  - monitor VS  - trend lactate  - plan as above    #BPH  - c/w Flomax 0.4mg qhs    SKIN  - no active issues    INFECTIOUS DISEASE  - No active issues  - plan for pulmonary edema as above  - Recent admission to OSH for ?PNA s/p 14 days of cefepime (last dose 10/14)  - Currently no signs of infection, WBC normal, afebrile, although monitor iso persistent tachycardia    ENDOCRINE    #DM2  - A1c 10/14 8.4%  - c/w Lantus 24u qhs + lispro 8u premeal + ISS  - trend FS, will likely need adjustment    #Thyroid nodule  CT chest: 3. 3 x 1.8 cm left supraclavicular nodule likely arising from the thyroid gland   -thyroid US outpatient    HEMATOLOGIC/DVT PPX  - No active issues  - DVT ppx: HSQ     #ETHICS  Full code? CICU Accept Note    CHIEF COMPLAINT:     HPI / INTERVAL HISTORY: 59 yo Male pmhx CVA with mild left sided deficits, HTN, DM2, vertigo, HLD, Mobitz II s/pp Medtronic dual chamber ICD (12/21/22) initially presented to [facility] from home with complaints of dyspnea and chest pain and was admitted w/ acute hypoxic respiratory failure likely due to acute pulmonary edema due to acute HFrEF in setting of acute NSTEMI, LAURA and enterovirus infection. Pt with brief MICU stay at [facility] requiring bipap (no intubation), was treated for PNA with cefepime, and was found to have TTE done 9/26/23 showing EF 20% with severely decreased LVSF, multiple regional wall motion abnormalities, and elevated LV end diastolic pressure. Pt was transferred to [facility] for cardiac cath evaluation. Patient without any acute complaints, complaining of intermittent palpitations for the last 2 days. No chest pain, SOB, fevers, nausea, vomiting, abdominal pain.    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 cMRI revealed limited LAD viability. RHC on 10/19 for hemodynamic assessment showed RA 10, PCWP 20, CO 4.4, CI 2.3. On 10/20, noted to be cold, clammy, altered after receiving toprol c/f borderline cardiogenic shock, admitted to CICU on 10/20 for CABG vs. Advanced therapy vs. high risk PCI evaluation. Deemed to be too high risk for CABG and was stable with hydralazine for afterload reduction and diuresis with bumex. Heart Failure team following. Pending discussion regarding high risk PCI vs further intervention; EP recommended no indication for upgrade at this time. No plan for VAD/transplant eval at this time.    On limited TTE 10/26, EF <20 %, LV apical thrombus identified. Per chart review, pt disoriented today and unable to provide year, location, month. Pt w/ persistent tachycardia, renal dysfunction, hypotension and today, elevated lactate. Transferred to CCU for inotropic support and hemodynamic monitoring.        PAST MEDICAL & SURGICAL HISTORY:  CVA (cerebrovascular accident)      T2DM (type 2 diabetes mellitus)      HTN (hypertension)      HLD (hyperlipidemia)      S/P cystoscopy      S/P hernia repair        HOME MEDICATIONS:    Allergies    No Known Allergies    Intolerances        REVIEW OF SYSTEMS:  Constitutional: No fevers, chills, weight loss, weight gain  HEENT: No vision problems, eye pain, nasal congestion, rhinorrhea, sore throat, dysphagia  CV: No chest pain, orthopnea, palpitations  Resp: No cough, dyspnea, wheezing, hemoptysis  GI: No nausea, vomiting, diarrhea, constipation, abdominal pain  : [ ] dysuria [ ] nocturia [ ] hematuria [ ] increased urinary frequency  Musculoskeletal: [ ] back pain [ ] myalgias [ ] arthralgias [ ] fracture  Skin: [ ] rash [ ] itch  Neurological: [ ] headache [ ] dizziness [ ] syncope [ ] weakness [ ] numbness  Psychiatric: [ ] anxiety [ ] depression  Endocrine: [ ] diabetes [ ] thyroid problem  Hematologic/Lymphatic: [ ] anemia [ ] bleeding problem  Allergic/Immunologic: [ ] itchy eyes [ ] nasal discharge [ ] hives [ ] angioedema  [ ] All other systems negative  [ ] Unable to assess ROS because *****    OBJECTIVE:  ICU Vital Signs Last 24 Hrs  T(C): 36.6 (28 Oct 2023 13:08), Max: 36.7 (27 Oct 2023 19:16)  T(F): 97.9 (28 Oct 2023 13:08), Max: 98.1 (27 Oct 2023 19:16)  HR: 125 (28 Oct 2023 13:08) (123 - 125)  BP: 89/56 (28 Oct 2023 13:08) (89/56 - 110/74)  BP(mean): --  ABP: --  ABP(mean): --  RR: 18 (28 Oct 2023 13:08) (18 - 20)  SpO2: 97% (28 Oct 2023 13:08) (90% - 97%)    O2 Parameters below as of 28 Oct 2023 05:21  Patient On (Oxygen Delivery Method): room air                10-27 @ 07:01  -  10-28 @ 07:00  --------------------------------------------------------  IN: 1565 mL / OUT: 430 mL / NET: 1135 mL    10-28 @ 07:01  -  10-28 @ 16:09  --------------------------------------------------------  IN: 240 mL / OUT: 0 mL / NET: 240 mL        Standing Lasix Dose: *****    Ideal body weight:   Vent Mode:  Rate:  TV: ***** ml/kg  PEEP:  Fio2:   Plateau Pressure:   Driving Pressure:   Vent Trends:    PHYSICAL EXAM:  General: Intubated/sedaed. Alert. Following commands? *****  HEENT: ET tube *****. No ulcerations, epistaxis, or signs of infection. Pupils constricted, ERRL. No scleral icterus or conjunctival pallor.  Neck: No JVD. Supple. No bruising or ecchymosis.  Chest/Lungs: CTAB, no wheezes, rhonchi, or rales. Normal excursion.   Heart: Regular rate and regular rhythm. No murmurs appreciated. *****.   Abdomen: Soft, non tender to palaption. No distension.   Extremities/skin: No significant extremity edema. Hands cool to touch. Cap refill < 2 sec. No unilateral leg swelling ****  Neuro: Following commands. Moving extremities spontaneously.  Psych: Sedated, appropriate mood *****    LINES:   - Location, insertion date, redness, indication?  - Douglass insertion date and last change?    HOSPITAL MEDICATIONS:  MEDICATIONS  (STANDING):  aspirin enteric coated 81 milliGRAM(s) Oral daily  atorvastatin 80 milliGRAM(s) Oral at bedtime  buMETAnide 2 milliGRAM(s) Oral daily  chlorhexidine 2% Cloths 1 Application(s) Topical daily  dapagliflozin 10 milliGRAM(s) Oral every 24 hours  dextrose 5%. 1000 milliLiter(s) (100 mL/Hr) IV Continuous <Continuous>  dextrose 5%. 1000 milliLiter(s) (50 mL/Hr) IV Continuous <Continuous>  dextrose 50% Injectable 12.5 Gram(s) IV Push once  dextrose 50% Injectable 25 Gram(s) IV Push once  dextrose 50% Injectable 25 Gram(s) IV Push once  glucagon  Injectable 1 milliGRAM(s) IntraMuscular once  heparin  Infusion.  Unit(s)/Hr (12 mL/Hr) IV Continuous <Continuous>  insulin glargine Injectable (LANTUS) 24 Unit(s) SubCutaneous at bedtime  insulin lispro (ADMELOG) corrective regimen sliding scale   SubCutaneous three times a day before meals  insulin lispro (ADMELOG) corrective regimen sliding scale   SubCutaneous at bedtime  insulin lispro Injectable (ADMELOG) 8 Unit(s) SubCutaneous three times a day before meals  pantoprazole    Tablet 40 milliGRAM(s) Oral before breakfast  sacubitril 24 mG/valsartan 26 mG 1 Tablet(s) Oral two times a day  senna 2 Tablet(s) Oral at bedtime  tamsulosin 0.4 milliGRAM(s) Oral at bedtime    MEDICATIONS  (PRN):  acetaminophen     Tablet .. 650 milliGRAM(s) Oral every 6 hours PRN Temp greater or equal to 38C (100.4F), Mild Pain (1 - 3)  albuterol    90 MICROgram(s) HFA Inhaler 2 Puff(s) Inhalation every 6 hours PRN Shortness of Breath and/or Wheezing  benzocaine/menthol Lozenge 1 Lozenge Oral every 2 hours PRN Sore Throat  dextrose Oral Gel 15 Gram(s) Oral once PRN Blood Glucose LESS THAN 70 milliGRAM(s)/deciliter  guaiFENesin Oral Liquid (Sugar-Free) 100 milliGRAM(s) Oral every 6 hours PRN Cough  heparin   Injectable 2500 Unit(s) IV Push every 6 hours PRN For aPTT between 40 - 57  heparin   Injectable 5500 Unit(s) IV Push every 6 hours PRN For aPTT less than 40  polyethylene glycol 3350 17 Gram(s) Oral daily PRN Constipation      Tube Feed rate and goal:   Stress ulcer prophylaxis:   Last BM:   GI motility meications:     LABS:                        11.8   10.38 )-----------( 210      ( 28 Oct 2023 06:35 )             37.7     Hgb Trend: 11.8<--, 12.0<--, 11.7<--, 12.1<--, 13.0<--  10-28    137  |  101  |  44<H>  ----------------------------<  192<H>  4.1   |  21<L>  |  1.74<H>    Ca    8.8      28 Oct 2023 06:35    TPro  6.4  /  Alb  3.2<L>  /  TBili  0.6  /  DBili  x   /  AST  14  /  ALT  28  /  AlkPhos  66  10-28    Creatinine Trend: 1.74<--, 1.69<--, 1.80<--, 1.92<--, 1.78<--, 1.78<--  PTT - ( 28 Oct 2023 13:34 )  PTT:62.6 sec  Urinalysis Basic - ( 28 Oct 2023 06:35 )    Color: x / Appearance: x / SG: x / pH: x  Gluc: 192 mg/dL / Ketone: x  / Bili: x / Urobili: x   Blood: x / Protein: x / Nitrite: x   Leuk Esterase: x / RBC: x / WBC x   Sq Epi: x / Non Sq Epi: x / Bacteria: x            MICROBIOLOGY:     RADIOLOGY & ADDITIONAL TESTS:      ASSESSMENT AND PLAN:    59 yo Male pmhx CVA with mild left sided deficits, HTN, DM2, vertigo, HLD, Mobitz II s/pp Medtronic dual chamber ICD (12/21/22) initially presented to OSH for SOB c/f ADHF vs. PNA, later found to have NSTEMI transferred to [facility] for LHC evaluation. 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 Admitted to CICU for hypotension, inotropic support, hemodynamic monitoring.    NEUROLOGICAL  #Encephalopathy   Baseline A&Ox3, lately A&Ox2 (not oriented to time). Likely iso hypoperfusion iso acute on chronic HF vs. metabolic vs. CVA  -CT head 10/27 w/ atrophy and small vessel white matter ischemic changes. R medial cerebellar infarct may be acute vs subacute  -start inotropic support  -check blood gas  -neurology following:   -c/w asa and statin   -c/w heparin gtt for low EF and LV thrombus   -MRI brain and MRA H/N when stable  -b12, TSH, RPR for reversible causes of dementia   -neuro checks    #Hx of CVA w/ mild L residual deficit  -unclear if imaging finding acute vs subacute  -plan as above    CARDIOVASCULAR  #Hypotension/cardiogenic shock  Likely iso acute on chronic HFrEF  ECHO 10/16: EF 20%, severely reduced LVSF  LHC 10/16: 95% pLAD, 80% mid and distal LCx, 90% mid and distal RCA  RHC 10/19: RA 10, PCWP 20, CO 4.4, CI 2.3  cMR 10/18: limited viability in LAD territory  TTE 10/28: EF <20 %, LV apical thrombus is seen. Small pericardial effusion noted adjacent to the right ventricle with no evidence of hemodynamic compromise.  -start dobutamine   -consider swan placement to confirm/r/o impending cardiogenic shock  -trend lactate, proBNP  -c/w bumex 2 mg PO qd, for UO goal net neg 2-3L  -presently net positive, consider further bumex as BP allows  -s/p bumex 3 mg IV x1  -pending HF recs regarding high risk PCI vs further intervention; EP recommended no indication for upgrade at this time. HF holding off on launching VAD/transplant eval  -hold Entresto iso hypotension  -off hydral/nitrates  -s/p dig load; check dig level tomorrow  -avoid BB/CCB given borderline cardiac function  -start GDMT when appropriate    #LV apical thrombus  -c/w heparin gtt  -MRI brain and MRA H/N when stable  -neuro checks    #Tachycardia  Mobitz II s/p AICD  - Likely reflex tachycardiac iso increased cardiac demand/ hypoxia vs. iso infxn  - s/p dig load, check level jeffrey  - ctm  - per EP, no need to upgrade device at this time  - tele    #HLD  - c/w Atorv 80mg     RESPIRATORY  #Pulmonary edema  Likely iso CHF vs. superimposed PNA  CT chest 10/28: scattered patchy and nodular bilateral upper lobe predominant groundglass opacities with interlobular septal thickening representing pulmonary edema  -c/w diuresis for net neg goal  -infectious cause not excluded, low threshold to add abx if leukocytosis or febrile, as pt persistently tachy  -f/u noncontrast CT chest in 1-3 month to ensure resolution    GI/NUTRITION  No active issues  - CC DASH Halal diet  - GI ppx: PPI 40mg daily  - c/w Bowel regimen    /RENAL  #LAURA:   sCr on admission 1.28, uptrending likely iso CHF  -Trend sCr  -Monitor I/O  -c/w Bumex   -Avoid NSAIDs, ACEI/ARBS, RCA and nephrotoxins  -Renally dose medications    #Elevated lactate  - uptrending lactate noted likely iso increased cardiac demand/CHF  - monitor VS  - trend lactate  - plan as above    #BPH  - c/w Flomax 0.4mg qhs    SKIN  - no active issues    INFECTIOUS DISEASE  - No active issues  - plan for pulmonary edema as above  - Recent admission to OSH for ?PNA s/p 14 days of cefepime (last dose 10/14)  - Currently no signs of infection, WBC normal, afebrile, although monitor iso persistent tachycardia    ENDOCRINE    #DM2  - A1c 10/14 8.4%  - c/w Lantus 24u qhs + lispro 8u premeal + ISS  - trend FS, will likely need adjustment    #Thyroid nodule  CT chest: 3. 3 x 1.8 cm left supraclavicular nodule likely arising from the thyroid gland   -thyroid US outpatient    HEMATOLOGIC/DVT PPX  - No active issues  - DVT ppx: HSQ     #ETHICS  Full code? CICU Accept Note    CHIEF COMPLAINT:     HPI / INTERVAL HISTORY: 57 yo Male pmhx CVA with mild left sided deficits, HTN, DM2, vertigo, HLD, Mobitz II s/pp Medtronic dual chamber ICD (12/21/22) initially presented to [facility] from home with complaints of dyspnea and chest pain and was admitted w/ acute hypoxic respiratory failure likely due to acute pulmonary edema due to acute HFrEF in setting of acute NSTEMI, LAURA and enterovirus infection. Pt with brief MICU stay at [facility] requiring bipap (no intubation), was treated for PNA with cefepime, and was found to have TTE done 9/26/23 showing EF 20% with severely decreased LVSF, multiple regional wall motion abnormalities, and elevated LV end diastolic pressure. Pt was transferred to [facility] for cardiac cath evaluation. Patient without any acute complaints, complaining of intermittent palpitations for the last 2 days. No chest pain, SOB, fevers, nausea, vomiting, abdominal pain.    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 cMRI revealed limited LAD viability. RHC on 10/19 for hemodynamic assessment showed RA 10, PCWP 20, CO 4.4, CI 2.3. On 10/20, noted to be cold, clammy, altered after receiving toprol c/f borderline cardiogenic shock, admitted to CICU on 10/20 for CABG vs. Advanced therapy vs. high risk PCI evaluation. Deemed to be too high risk for CABG and was stable with hydralazine for afterload reduction and diuresis with bumex. Heart Failure team following. Pending discussion regarding high risk PCI vs further intervention; EP recommended no indication for upgrade at this time. No plan for VAD/transplant eval at this time.    On limited TTE 10/26, EF <20 %, LV apical thrombus identified. Per chart review, pt disoriented today and unable to provide year, location, month. Pt w/ persistent tachycardia, renal dysfunction, hypotension and today, elevated lactate. Transferred to CCU for inotropic support and hemodynamic monitoring.        PAST MEDICAL & SURGICAL HISTORY:  CVA (cerebrovascular accident)      T2DM (type 2 diabetes mellitus)      HTN (hypertension)      HLD (hyperlipidemia)      S/P cystoscopy      S/P hernia repair        HOME MEDICATIONS:    Allergies    No Known Allergies    Intolerances        REVIEW OF SYSTEMS:  Constitutional: No fevers, chills, weight loss, weight gain  HEENT: No vision problems, eye pain, nasal congestion, rhinorrhea, sore throat, dysphagia  CV: No chest pain, orthopnea, palpitations  Resp: No cough, dyspnea, wheezing, hemoptysis  GI: No nausea, vomiting, diarrhea, constipation, abdominal pain  : [ ] dysuria [ ] nocturia [ ] hematuria [ ] increased urinary frequency  Musculoskeletal: [ ] back pain [ ] myalgias [ ] arthralgias [ ] fracture  Skin: [ ] rash [ ] itch  Neurological: [ ] headache [ ] dizziness [ ] syncope [ ] weakness [ ] numbness  Psychiatric: [ ] anxiety [ ] depression  Endocrine: [ ] diabetes [ ] thyroid problem  Hematologic/Lymphatic: [ ] anemia [ ] bleeding problem  Allergic/Immunologic: [ ] itchy eyes [ ] nasal discharge [ ] hives [ ] angioedema  [ ] All other systems negative  [ ] Unable to assess ROS because *****    OBJECTIVE:  ICU Vital Signs Last 24 Hrs  T(C): 36.6 (28 Oct 2023 13:08), Max: 36.7 (27 Oct 2023 19:16)  T(F): 97.9 (28 Oct 2023 13:08), Max: 98.1 (27 Oct 2023 19:16)  HR: 125 (28 Oct 2023 13:08) (123 - 125)  BP: 89/56 (28 Oct 2023 13:08) (89/56 - 110/74)  BP(mean): --  ABP: --  ABP(mean): --  RR: 18 (28 Oct 2023 13:08) (18 - 20)  SpO2: 97% (28 Oct 2023 13:08) (90% - 97%)    O2 Parameters below as of 28 Oct 2023 05:21  Patient On (Oxygen Delivery Method): room air                10-27 @ 07:01  -  10-28 @ 07:00  --------------------------------------------------------  IN: 1565 mL / OUT: 430 mL / NET: 1135 mL    10-28 @ 07:01  -  10-28 @ 16:09  --------------------------------------------------------  IN: 240 mL / OUT: 0 mL / NET: 240 mL        Standing Lasix Dose: *****    Ideal body weight:   Vent Mode:  Rate:  TV: ***** ml/kg  PEEP:  Fio2:   Plateau Pressure:   Driving Pressure:   Vent Trends:    PHYSICAL EXAM:  General: Intubated/sedaed. Alert. Following commands? *****  HEENT: ET tube *****. No ulcerations, epistaxis, or signs of infection. Pupils constricted, ERRL. No scleral icterus or conjunctival pallor.  Neck: No JVD. Supple. No bruising or ecchymosis.  Chest/Lungs: CTAB, no wheezes, rhonchi, or rales. Normal excursion.   Heart: Regular rate and regular rhythm. No murmurs appreciated. *****.   Abdomen: Soft, non tender to palaption. No distension.   Extremities/skin: No significant extremity edema. Hands cool to touch. Cap refill < 2 sec. No unilateral leg swelling ****  Neuro: Following commands. Moving extremities spontaneously.  Psych: Sedated, appropriate mood *****    LINES:   - Location, insertion date, redness, indication?  - Douglass insertion date and last change?    HOSPITAL MEDICATIONS:  MEDICATIONS  (STANDING):  aspirin enteric coated 81 milliGRAM(s) Oral daily  atorvastatin 80 milliGRAM(s) Oral at bedtime  buMETAnide 2 milliGRAM(s) Oral daily  chlorhexidine 2% Cloths 1 Application(s) Topical daily  dapagliflozin 10 milliGRAM(s) Oral every 24 hours  dextrose 5%. 1000 milliLiter(s) (100 mL/Hr) IV Continuous <Continuous>  dextrose 5%. 1000 milliLiter(s) (50 mL/Hr) IV Continuous <Continuous>  dextrose 50% Injectable 12.5 Gram(s) IV Push once  dextrose 50% Injectable 25 Gram(s) IV Push once  dextrose 50% Injectable 25 Gram(s) IV Push once  glucagon  Injectable 1 milliGRAM(s) IntraMuscular once  heparin  Infusion.  Unit(s)/Hr (12 mL/Hr) IV Continuous <Continuous>  insulin glargine Injectable (LANTUS) 24 Unit(s) SubCutaneous at bedtime  insulin lispro (ADMELOG) corrective regimen sliding scale   SubCutaneous three times a day before meals  insulin lispro (ADMELOG) corrective regimen sliding scale   SubCutaneous at bedtime  insulin lispro Injectable (ADMELOG) 8 Unit(s) SubCutaneous three times a day before meals  pantoprazole    Tablet 40 milliGRAM(s) Oral before breakfast  sacubitril 24 mG/valsartan 26 mG 1 Tablet(s) Oral two times a day  senna 2 Tablet(s) Oral at bedtime  tamsulosin 0.4 milliGRAM(s) Oral at bedtime    MEDICATIONS  (PRN):  acetaminophen     Tablet .. 650 milliGRAM(s) Oral every 6 hours PRN Temp greater or equal to 38C (100.4F), Mild Pain (1 - 3)  albuterol    90 MICROgram(s) HFA Inhaler 2 Puff(s) Inhalation every 6 hours PRN Shortness of Breath and/or Wheezing  benzocaine/menthol Lozenge 1 Lozenge Oral every 2 hours PRN Sore Throat  dextrose Oral Gel 15 Gram(s) Oral once PRN Blood Glucose LESS THAN 70 milliGRAM(s)/deciliter  guaiFENesin Oral Liquid (Sugar-Free) 100 milliGRAM(s) Oral every 6 hours PRN Cough  heparin   Injectable 2500 Unit(s) IV Push every 6 hours PRN For aPTT between 40 - 57  heparin   Injectable 5500 Unit(s) IV Push every 6 hours PRN For aPTT less than 40  polyethylene glycol 3350 17 Gram(s) Oral daily PRN Constipation      Tube Feed rate and goal:   Stress ulcer prophylaxis:   Last BM:   GI motility meications:     LABS:                        11.8   10.38 )-----------( 210      ( 28 Oct 2023 06:35 )             37.7     Hgb Trend: 11.8<--, 12.0<--, 11.7<--, 12.1<--, 13.0<--  10-28    137  |  101  |  44<H>  ----------------------------<  192<H>  4.1   |  21<L>  |  1.74<H>    Ca    8.8      28 Oct 2023 06:35    TPro  6.4  /  Alb  3.2<L>  /  TBili  0.6  /  DBili  x   /  AST  14  /  ALT  28  /  AlkPhos  66  10-28    Creatinine Trend: 1.74<--, 1.69<--, 1.80<--, 1.92<--, 1.78<--, 1.78<--  PTT - ( 28 Oct 2023 13:34 )  PTT:62.6 sec  Urinalysis Basic - ( 28 Oct 2023 06:35 )    Color: x / Appearance: x / SG: x / pH: x  Gluc: 192 mg/dL / Ketone: x  / Bili: x / Urobili: x   Blood: x / Protein: x / Nitrite: x   Leuk Esterase: x / RBC: x / WBC x   Sq Epi: x / Non Sq Epi: x / Bacteria: x            MICROBIOLOGY:     RADIOLOGY & ADDITIONAL TESTS:      ASSESSMENT AND PLAN:    57 yo Male pmhx CVA with mild left sided deficits, HTN, DM2, vertigo, HLD, Mobitz II s/pp Medtronic dual chamber ICD (12/21/22) initially presented to OSH for SOB c/f ADHF vs. PNA, later found to have NSTEMI transferred to [facility] for LHC evaluation. 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 Admitted to CICU for hypotension, inotropic support, hemodynamic monitoring.    NEUROLOGICAL  #Encephalopathy   Baseline A&Ox3, lately A&Ox2 (not oriented to time). Likely iso hypoperfusion iso acute on chronic HF vs. metabolic vs. CVA  -CT head 10/27 w/ atrophy and small vessel white matter ischemic changes. R medial cerebellar infarct may be acute vs subacute  -start inotropic support  -check blood gas  -neurology following:   -c/w asa and statin   -c/w heparin gtt for low EF and LV thrombus   -MRI brain and MRA H/N when stable  -b12, TSH, RPR for reversible causes of dementia   -neuro checks    #Hx of CVA w/ mild L residual deficit  -unclear if imaging finding acute vs subacute  -plan as above    CARDIOVASCULAR  #Hypotension/cardiogenic shock  Likely iso acute on chronic HFrEF  ECHO 10/16: EF 20%, severely reduced LVSF  LHC 10/16: 95% pLAD, 80% mid and distal LCx, 90% mid and distal RCA  RHC 10/19: RA 10, PCWP 20, CO 4.4, CI 2.3  cMR 10/18: limited viability in LAD territory  TTE 10/26: EF <20 %, LV apical thrombus is seen. Small pericardial effusion noted adjacent to the right ventricle with no evidence of hemodynamic compromise.  -start dobutamine   -consider swan placement to confirm/r/o impending cardiogenic shock  -trend lactate, proBNP  -c/w bumex 2 mg PO qd, for UO goal net neg 2-3L  -presently net positive, consider further bumex as BP allows  -s/p bumex 3 mg IV x1  -pending HF recs regarding high risk PCI vs further intervention; EP recommended no indication for upgrade at this time. HF holding off on launching VAD/transplant eval  -hold Entresto iso hypotension  -off hydral/nitrates  -s/p dig load; check dig level tomorrow  -avoid BB/CCB given borderline cardiac function  -start GDMT when appropriate    #LV apical thrombus  -c/w heparin gtt  -MRI brain and MRA H/N when stable  -neuro checks    #Tachycardia  Mobitz II s/p AICD  - Likely reflex tachycardiac iso increased cardiac demand/ hypoxia vs. iso infxn  - s/p dig load, check level jeffrey  - ctm  - per EP, no need to upgrade device at this time  - tele    #HLD  - c/w Atorv 80mg     RESPIRATORY  #Pulmonary edema  Likely iso CHF vs. superimposed PNA  CT chest 10/28: scattered patchy and nodular bilateral upper lobe predominant groundglass opacities with interlobular septal thickening representing pulmonary edema  -c/w diuresis for net neg goal  -infectious cause not excluded, low threshold to add abx if leukocytosis or febrile, as pt persistently tachy  -f/u noncontrast CT chest in 1-3 month to ensure resolution    GI/NUTRITION  No active issues  - CC DASH Halal diet  - GI ppx: PPI 40mg daily  - c/w Bowel regimen    /RENAL  #LAURA:   sCr on admission 1.28, uptrending likely iso CHF  -Trend sCr  -Monitor I/O  -c/w Bumex   -Avoid NSAIDs, ACEI/ARBS, RCA and nephrotoxins  -Renally dose medications    #Elevated lactate  - uptrending lactate noted likely iso increased cardiac demand/CHF  - monitor VS  - trend lactate  - plan as above    #BPH  - c/w Flomax 0.4mg qhs    SKIN  - no active issues    INFECTIOUS DISEASE  - No active issues  - plan for pulmonary edema as above  - Recent admission to OSH for ?PNA s/p 14 days of cefepime (last dose 10/14)  - Currently no signs of infection, WBC normal, afebrile, although monitor iso persistent tachycardia    ENDOCRINE    #DM2  - A1c 10/14 8.4%  - c/w Lantus 24u qhs + lispro 8u premeal + ISS  - trend FS, will likely need adjustment    #Thyroid nodule  CT chest: 3. 3 x 1.8 cm left supraclavicular nodule likely arising from the thyroid gland   -thyroid US outpatient    HEMATOLOGIC/DVT PPX  - No active issues  - DVT ppx: HSQ     #ETHICS  Full code? CICU Accept Note    CHIEF COMPLAINT:     HPI / INTERVAL HISTORY: 57 yo Male pmhx CVA with mild left sided deficits, HTN, DM2, vertigo, HLD, Mobitz II s/pp Medtronic dual chamber ICD (12/21/22) initially presented to [facility] from home with complaints of dyspnea and chest pain and was admitted w/ acute hypoxic respiratory failure likely due to acute pulmonary edema due to acute HFrEF in setting of acute NSTEMI, LAURA and enterovirus infection. Pt with brief MICU stay at [facility] requiring bipap (no intubation), was treated for PNA with cefepime, and was found to have TTE done 9/26/23 showing EF 20% with severely decreased LVSF, multiple regional wall motion abnormalities, and elevated LV end diastolic pressure. Pt was transferred to [facility] for cardiac cath evaluation. Patient without any acute complaints, complaining of intermittent palpitations for the last 2 days. No chest pain, SOB, fevers, nausea, vomiting, abdominal pain.    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 cMRI revealed limited LAD viability. RHC on 10/19 for hemodynamic assessment showed RA 10, PCWP 20, CO 4.4, CI 2.3. On 10/20, noted to be cold, clammy, altered after receiving toprol c/f borderline cardiogenic shock, admitted to CICU on 10/20 for CABG vs. Advanced therapy vs. high risk PCI evaluation. Deemed to be too high risk for CABG and was stable with hydralazine for afterload reduction and diuresis with bumex. Heart Failure team following. Pending discussion regarding high risk PCI vs further intervention; EP recommended no indication for upgrade at this time. No plan for VAD/transplant eval at this time.    On limited TTE 10/26, EF <20 %, LV apical thrombus identified. Per chart review, pt disoriented today and unable to provide year, location, month. Pt w/ persistent tachycardia, renal dysfunction, hypotension and today, elevated lactate. Transferred to CCU for inotropic support and hemodynamic monitoring.        PAST MEDICAL & SURGICAL HISTORY:  CVA (cerebrovascular accident)      T2DM (type 2 diabetes mellitus)      HTN (hypertension)      HLD (hyperlipidemia)      S/P cystoscopy      S/P hernia repair        HOME MEDICATIONS:    Allergies    No Known Allergies    Intolerances        REVIEW OF SYSTEMS:  Constitutional: No fevers, chills, weight loss, weight gain  HEENT: No vision problems, eye pain, nasal congestion, rhinorrhea, sore throat, dysphagia  CV: No chest pain, orthopnea, palpitations  Resp: No cough, dyspnea, wheezing, hemoptysis  GI: No nausea, vomiting, diarrhea, constipation, abdominal pain  : [ ] dysuria [ ] nocturia [ ] hematuria [ ] increased urinary frequency  Musculoskeletal: [ ] back pain [ ] myalgias [ ] arthralgias [ ] fracture  Skin: [ ] rash [ ] itch  Neurological: [ ] headache [ ] dizziness [ ] syncope [ ] weakness [ ] numbness  Psychiatric: [ ] anxiety [ ] depression  Endocrine: [ ] diabetes [ ] thyroid problem  Hematologic/Lymphatic: [ ] anemia [ ] bleeding problem  Allergic/Immunologic: [ ] itchy eyes [ ] nasal discharge [ ] hives [ ] angioedema  [ ] All other systems negative  [ ] Unable to assess ROS because *****    OBJECTIVE:  ICU Vital Signs Last 24 Hrs  T(C): 36.6 (28 Oct 2023 13:08), Max: 36.7 (27 Oct 2023 19:16)  T(F): 97.9 (28 Oct 2023 13:08), Max: 98.1 (27 Oct 2023 19:16)  HR: 125 (28 Oct 2023 13:08) (123 - 125)  BP: 89/56 (28 Oct 2023 13:08) (89/56 - 110/74)  BP(mean): --  ABP: --  ABP(mean): --  RR: 18 (28 Oct 2023 13:08) (18 - 20)  SpO2: 97% (28 Oct 2023 13:08) (90% - 97%)    O2 Parameters below as of 28 Oct 2023 05:21  Patient On (Oxygen Delivery Method): room air                10-27 @ 07:01  -  10-28 @ 07:00  --------------------------------------------------------  IN: 1565 mL / OUT: 430 mL / NET: 1135 mL    10-28 @ 07:01  -  10-28 @ 16:09  --------------------------------------------------------  IN: 240 mL / OUT: 0 mL / NET: 240 mL        Standing Lasix Dose: *****    Ideal body weight:   Vent Mode:  Rate:  TV: ***** ml/kg  PEEP:  Fio2:   Plateau Pressure:   Driving Pressure:   Vent Trends:    PHYSICAL EXAM:  General: Alert. A&Ox2  HEENT: No ulcerations, epistaxis, or signs of infection. Pupils  ERRL. No scleral icterus or conjunctival pallor.  Neck: No JVD. Supple. No bruising or ecchymosis.  Chest/Lungs: CTAB, mild wheezes, no rhonchi, or rales. Normal excursion.   Heart: Regular rate and regular rhythm. No murmurs appreciated.    Abdomen: Soft, large, non tender to palaption. No distension.   Extremities/skin: No significant extremity edema. Hands cool to touch.  No unilateral leg swelling   Neuro: Following commands. Moving extremities spontaneously.  Psych: appropriate mood     LINES:   - Location, insertion date, redness, indication?  - Douglass insertion date and last change?    HOSPITAL MEDICATIONS:  MEDICATIONS  (STANDING):  aspirin enteric coated 81 milliGRAM(s) Oral daily  atorvastatin 80 milliGRAM(s) Oral at bedtime  buMETAnide 2 milliGRAM(s) Oral daily  chlorhexidine 2% Cloths 1 Application(s) Topical daily  dapagliflozin 10 milliGRAM(s) Oral every 24 hours  dextrose 5%. 1000 milliLiter(s) (100 mL/Hr) IV Continuous <Continuous>  dextrose 5%. 1000 milliLiter(s) (50 mL/Hr) IV Continuous <Continuous>  dextrose 50% Injectable 12.5 Gram(s) IV Push once  dextrose 50% Injectable 25 Gram(s) IV Push once  dextrose 50% Injectable 25 Gram(s) IV Push once  glucagon  Injectable 1 milliGRAM(s) IntraMuscular once  heparin  Infusion.  Unit(s)/Hr (12 mL/Hr) IV Continuous <Continuous>  insulin glargine Injectable (LANTUS) 24 Unit(s) SubCutaneous at bedtime  insulin lispro (ADMELOG) corrective regimen sliding scale   SubCutaneous three times a day before meals  insulin lispro (ADMELOG) corrective regimen sliding scale   SubCutaneous at bedtime  insulin lispro Injectable (ADMELOG) 8 Unit(s) SubCutaneous three times a day before meals  pantoprazole    Tablet 40 milliGRAM(s) Oral before breakfast  sacubitril 24 mG/valsartan 26 mG 1 Tablet(s) Oral two times a day  senna 2 Tablet(s) Oral at bedtime  tamsulosin 0.4 milliGRAM(s) Oral at bedtime    MEDICATIONS  (PRN):  acetaminophen     Tablet .. 650 milliGRAM(s) Oral every 6 hours PRN Temp greater or equal to 38C (100.4F), Mild Pain (1 - 3)  albuterol    90 MICROgram(s) HFA Inhaler 2 Puff(s) Inhalation every 6 hours PRN Shortness of Breath and/or Wheezing  benzocaine/menthol Lozenge 1 Lozenge Oral every 2 hours PRN Sore Throat  dextrose Oral Gel 15 Gram(s) Oral once PRN Blood Glucose LESS THAN 70 milliGRAM(s)/deciliter  guaiFENesin Oral Liquid (Sugar-Free) 100 milliGRAM(s) Oral every 6 hours PRN Cough  heparin   Injectable 2500 Unit(s) IV Push every 6 hours PRN For aPTT between 40 - 57  heparin   Injectable 5500 Unit(s) IV Push every 6 hours PRN For aPTT less than 40  polyethylene glycol 3350 17 Gram(s) Oral daily PRN Constipation      Tube Feed rate and goal:   Stress ulcer prophylaxis:   Last BM:   GI motility meications:     LABS:                        11.8   10.38 )-----------( 210      ( 28 Oct 2023 06:35 )             37.7     Hgb Trend: 11.8<--, 12.0<--, 11.7<--, 12.1<--, 13.0<--  10-28    137  |  101  |  44<H>  ----------------------------<  192<H>  4.1   |  21<L>  |  1.74<H>    Ca    8.8      28 Oct 2023 06:35    TPro  6.4  /  Alb  3.2<L>  /  TBili  0.6  /  DBili  x   /  AST  14  /  ALT  28  /  AlkPhos  66  10-28    Creatinine Trend: 1.74<--, 1.69<--, 1.80<--, 1.92<--, 1.78<--, 1.78<--  PTT - ( 28 Oct 2023 13:34 )  PTT:62.6 sec  Urinalysis Basic - ( 28 Oct 2023 06:35 )    Color: x / Appearance: x / SG: x / pH: x  Gluc: 192 mg/dL / Ketone: x  / Bili: x / Urobili: x   Blood: x / Protein: x / Nitrite: x   Leuk Esterase: x / RBC: x / WBC x   Sq Epi: x / Non Sq Epi: x / Bacteria: x            MICROBIOLOGY:     RADIOLOGY & ADDITIONAL TESTS:      ASSESSMENT AND PLAN:    57 yo Male pmhx CVA with mild left sided deficits, HTN, DM2, vertigo, HLD, Mobitz II s/pp Medtronic dual chamber ICD (12/21/22) initially presented to OSH for SOB c/f ADHF vs. PNA, later found to have NSTEMI transferred to [facility] for LHC evaluation. 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 Admitted to CICU for hypotension, inotropic support, hemodynamic monitoring.    NEUROLOGICAL  #Encephalopathy   Baseline A&Ox3, lately A&Ox2 (not oriented to time). Likely iso hypoperfusion iso acute on chronic HF vs. metabolic vs. CVA  -CT head 10/27 w/ atrophy and small vessel white matter ischemic changes. R medial cerebellar infarct may be acute vs subacute  -start inotropic support  -check blood gas  -neurology following:   -c/w asa and statin   -c/w heparin gtt for low EF and LV thrombus   -MRI brain and MRA H/N when stable  -b12, TSH, RPR for reversible causes of dementia   -neuro checks    #Hx of CVA w/ mild L residual deficit  -unclear if imaging finding acute vs subacute  -plan as above    CARDIOVASCULAR  #Hypotension/cardiogenic shock  Likely iso acute on chronic HFrEF  ECHO 10/16: EF 20%, severely reduced LVSF  LHC 10/16: 95% pLAD, 80% mid and distal LCx, 90% mid and distal RCA  RHC 10/19: RA 10, PCWP 20, CO 4.4, CI 2.3  cMR 10/18: limited viability in LAD territory  TTE 10/26: EF <20 %, LV apical thrombus is seen. Small pericardial effusion noted adjacent to the right ventricle with no evidence of hemodynamic compromise.  -start dobutamine   -consider swan placement to confirm/r/o impending cardiogenic shock  -trend lactate, proBNP  -c/w bumex 2 mg PO qd, for UO goal net neg 2-3L  -presently net positive, consider further bumex as BP allows  -s/p bumex 3 mg IV x1  -pending HF recs regarding high risk PCI vs further intervention; EP recommended no indication for upgrade at this time. HF holding off on launching VAD/transplant eval  -hold Entresto iso hypotension  -off hydral/nitrates  -s/p dig load; check dig level tomorrow  -avoid BB/CCB given borderline cardiac function  -start GDMT when appropriate    #LV apical thrombus  -c/w heparin gtt  -MRI brain and MRA H/N when stable  -neuro checks    #Tachycardia  Mobitz II s/p AICD  - Likely reflex tachycardiac iso increased cardiac demand/ hypoxia vs. iso infxn  - s/p dig load, check level jeffrey  - ctm  - per EP, no need to upgrade device at this time  - tele    #HLD  - c/w Atorv 80mg     RESPIRATORY  #Pulmonary edema  Likely iso CHF vs. superimposed PNA  CT chest 10/28: scattered patchy and nodular bilateral upper lobe predominant groundglass opacities with interlobular septal thickening representing pulmonary edema  -c/w diuresis for net neg goal  -infectious cause not excluded, low threshold to add abx if leukocytosis or febrile, as pt persistently tachy  -f/u noncontrast CT chest in 1-3 month to ensure resolution    GI/NUTRITION  No active issues  - CC DASH Halal diet  - GI ppx: PPI 40mg daily  - c/w Bowel regimen    /RENAL  #LAURA:   sCr on admission 1.28, uptrending likely iso CHF  -Trend sCr  -Monitor I/O  -c/w Bumex   -Avoid NSAIDs, ACEI/ARBS, RCA and nephrotoxins  -Renally dose medications    #Elevated lactate  - uptrending lactate noted likely iso increased cardiac demand/CHF  - monitor VS  - trend lactate  - plan as above    #BPH  - c/w Flomax 0.4mg qhs    SKIN  - no active issues    INFECTIOUS DISEASE  - No active issues  - plan for pulmonary edema as above  - Recent admission to OSH for ?PNA s/p 14 days of cefepime (last dose 10/14)  - Currently no signs of infection, WBC normal, afebrile, although monitor iso persistent tachycardia    ENDOCRINE    #DM2  - A1c 10/14 8.4%  - c/w Lantus 24u qhs + lispro 8u premeal + ISS  - trend FS, will likely need adjustment    #Thyroid nodule  CT chest: 3. 3 x 1.8 cm left supraclavicular nodule likely arising from the thyroid gland   -thyroid US outpatient    HEMATOLOGIC/DVT PPX  - No active issues  - DVT ppx: HSQ     #ETHICS  Full code?

## 2023-10-28 NOTE — PROGRESS NOTE ADULT - ATTENDING COMMENTS
Denies complaints. Remains disoriented and unable to provide year, location, month. On exam, JVP approx 8-10 with HJR, RRR, possible S3, CTAB, nontender abdomen, no pedal edema, WWP. Labs reviewed - BUN/Cr 44/1.79 (uptrending; b/l 1.48), trop I 25k (9/26), albumin 3.2, AST/ALT wnl. TTE reviewed - EF 20% (segmental; only base preserved), LVOT VTI 8 cm, severe MR. Currently stage C/D HF, NYHA class III with concerning features Denies complaints. Remains disoriented and unable to provide year, location, month. On exam, JVP approx 8-10 with HJR, RRR, possible S3, CTAB, nontender abdomen, no pedal edema, WWP. Labs reviewed - BUN/Cr 44/1.79 (uptrending; b/l 1.48), trop I 25k (9/26), albumin 3.2, AST/ALT wnl. TTE reviewed - EF 20% (segmental; only base preserved), LVOT VTI 8 cm, severe MR. Currently stage C/D HF, NYHA class III with concerning features of persistent tachycardia, renal dysfunction, borderline low BP. Unclear if cognitive issues are driven by possible low output state.  - check lactate, proBNP  - give bumex 3 mg IV x1  - likely hold Entresto/Farxiga  - s/p dig load; check dig level tomorrow  - will transfer to CICU if lactate elevated  - attempted to reach wife but unable to connect

## 2023-10-28 NOTE — PROGRESS NOTE ADULT - PROBLEM SELECTOR PLAN 1
ECHO 10/16: EF 20%, severely reduced LVSF. HF team following. Discontinued Torpol XL 10/20  Started on Entresto 24/26 BID  On Bumex gtt plan to switch to PO Bumex 2mg PO daily on 10/25.   Off Hydralazine/nitrates.     Per HF team: severe LV dysfunction with tachycardia and poor non-invasive hemodynamics concerning, currently holding off on launching VAD/transplant eval however may need to reassess. Of note he has good support and no clear psychosocial red flags. ABO AB. would need neurologic assessment with prior CVA and ideally improved conditioning given ongoing prolonged hospitalization  EP called for upgrade of device--> no indication at this time.   Followup Heart failure recs--> ProBNP, Lactate, 3mg Bumex IVP x1(soft BP) monitor prior to giving.

## 2023-10-28 NOTE — PROGRESS NOTE ADULT - ASSESSMENT
57 yo Male with prior Stroke with mild ?left sided deficits (improved), HTN, DM2, vertigo, HLD, Mobitz II s/pp [employer] dual chamber ICD (12/21/22) initially presented to [facility] from home with complaints of dyspnea and chest pain and was admitted w/ acute hypoxic respiratory failure likely due to acute pulmonary edema due to acute HFrEF in setting of acute NSTEMI, LAURA and enterovirus infection. Pt with brief MICU stay at [facility] requiring bipap (no intubation), was treated for PNA with cefepime, and was found to have TTE done 9/26/23 showing EF 20% with severely decreased LVSF, multiple regional wall motion abnormalities, and elevated LV end diastolic pressure. Pt was transferred to [facility] for cardiac eval.   TTE EF < 20%  RHC 10/19: RA 10, RV 47/9/13, Wedge 29, PA 41/26/33, CO/CI 4.4/2.3, PA sat 57.3  EKG: sinus tachycardia, septal q-waves, left axis deviation   TTE 10/16/23: LV 5.5 cm, LVEF 20% with regional WMAs worse in the apex, LVOT VTI 8 cm, normal RV size/function, severe functional MR, small pericardial effusion, estimated RA pressure 8 mmHg   TTE 12/2022: normal LVEF   LHC: 95% discrete proximal LAD disease and sequential multifocal disease with good distal target, 80-90% proximal LCx disease just prior to marginals, severe multifocal RCA disease with good targets   A1c 8.4    CD 10/17 neg   NIHSS 1  CTH with age indeterminate R cerebellar infarct.  likely chronic     Impression:   R cerebellar infarct, age indeterminate, likely chronic   cognitive issues, not understanding why he is in hospital r/o embolic shower. 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   - no objection to heparin drip for low EF and LV thrombus   - called for risk stratification for heart transplant eval, likel;y low risk and no objection but would check imaging firts   - check MRI brain and MRA H/N   - b12, TSH, RPR for reversible causes of dementia   - telemetry  - PT/OT   - check FS, glucose control <180  - GI/DVT ppx   - Thank you for allowing me to participate in the care of this patient. Call with questions.   spoke with primary team and cardiac fellow   Abelardo Irving MD  Vascular Neurology  Office: [phone]

## 2023-10-28 NOTE — PROGRESS NOTE ADULT - SUBJECTIVE AND OBJECTIVE BOX
BRANDEN MARRUFO  MRN-[id number]  Patient is a 58y old  Male who presents with a chief complaint of NSTEMI (28 Oct 2023 15:22)    HPI:  Patient with separate chart from [facility], MRN# [id number]    57 yo Male pmhx CVA with mild left sided deficits, HTN, DM2, vertigo, HLD, Mobitz II s/pp Medtronic dual chamber ICD (12/21/22) initially presented to [facility] from home with complaints of dyspnea and chest pain and was admitted w/ acute hypoxic respiratory failure likely due to acute pulmonary edema due to acute HFrEF in setting of acute NSTEMI, LAURA and enterovirus infection. Pt with brief MICU stay at [facility] requiring bipap (no intubation), was treated for PNA with cefepime, and was found to have TTE done 9/26/23 showing EF 20% with severely decreased LVSF, multiple regional wall motion abnormalities, and elevated LV end diastolic pressure. Pt was transferred to [facility] for cardiac cath evaluation. Patient without any acute complaints, complaining of intermittent palpitations for the last 2 days. No chest pain, SOB, fevers, nausea, vomiting, abdominal pain.  (13 Oct 2023 21:05)      Hospital Course:    24 HOUR EVENTS:    REVIEW OF SYSTEMS:    CONSTITUTIONAL: No weakness, fevers or chills  EYES/ENT: No visual changes;  No vertigo or throat pain   NECK: No pain or stiffness  RESPIRATORY: No cough, wheezing, hemoptysis; No shortness of breath  CARDIOVASCULAR: No chest pain or palpitations  GASTROINTESTINAL: No abdominal or epigastric pain. No nausea, vomiting, or hematemesis; No diarrhea or constipation. No melena or hematochezia.  GENITOURINARY: No dysuria, frequency or hematuria  NEUROLOGICAL: No numbness or weakness  SKIN: No itching, rashes      ICU Vital Signs Last 24 Hrs  T(C): 36.8 (28 Oct 2023 17:45), Max: 36.8 (28 Oct 2023 17:45)  T(F): 98.3 (28 Oct 2023 17:45), Max: 98.3 (28 Oct 2023 17:45)  HR: 130 (28 Oct 2023 18:00) (122 - 130)  BP: 105/74 (28 Oct 2023 18:00) (89/56 - 110/74)  BP(mean): 84 (28 Oct 2023 18:00) (84 - 84)  ABP: --  ABP(mean): --  RR: 24 (28 Oct 2023 18:00) (18 - 24)  SpO2: 94% (28 Oct 2023 18:00) (90% - 97%)    O2 Parameters below as of 28 Oct 2023 18:00  Patient On (Oxygen Delivery Method): room air            CVP(mm Hg): --  CO: --  CI: --  PA: --  PA(mean): --  PA(direct): --  PCWP: --  LA: --  RA: --  SVR: --  SVRI: --  PVR: --  PVRI: --  I&O's Summary    27 Oct 2023 07:01  -  28 Oct 2023 07:00  --------------------------------------------------------  IN: 1565 mL / OUT: 430 mL / NET: 1135 mL    28 Oct 2023 07:01  -  28 Oct 2023 19:25  --------------------------------------------------------  IN: 240 mL / OUT: 0 mL / NET: 240 mL        CAPILLARY BLOOD GLUCOSE    CAPILLARY BLOOD GLUCOSE      POCT Blood Glucose.: 324 mg/dL (28 Oct 2023 17:00)      PHYSICAL EXAM:  GENERAL: No acute distress, well-developed  HEAD:  Atraumatic, Normocephalic  EYES: EOMI, PERRLA, conjunctiva and sclera clear  NECK: Supple, no lymphadenopathy, no JVD  CHEST/LUNG: CTAB; No wheezes, rales, or rhonchi  HEART: Regular rate and rhythm. Normal S1/S2. No murmurs, rubs, or gallops  ABDOMEN: Soft, non-tender, non-distended; normal bowel sounds, no organomegaly  EXTREMITIES:  2+ peripheral pulses b/l, No clubbing, cyanosis, or edema  NEUROLOGY: A&O x 3, no focal deficits  SKIN: No rashes or lesions    ============================I/O===========================   I&O's Detail    27 Oct 2023 07:01  -  28 Oct 2023 07:00  --------------------------------------------------------  IN:    Heparin Infusion: 235 mL    Oral Fluid: 1330 mL  Total IN: 1565 mL    OUT:    Indwelling Catheter - Urethral (mL): 160 mL    Voided (mL): 270 mL  Total OUT: 430 mL    Total NET: 1135 mL      28 Oct 2023 07:01  -  28 Oct 2023 19:25  --------------------------------------------------------  IN:    Oral Fluid: 240 mL  Total IN: 240 mL    OUT:  Total OUT: 0 mL    Total NET: 240 mL        ============================ LABS =========================                        11.8   10.38 )-----------( 210      ( 28 Oct 2023 06:35 )             37.7     10-28    137  |  101  |  44<H>  ----------------------------<  192<H>  4.1   |  21<L>  |  1.74<H>    Ca    8.8      28 Oct 2023 06:35    TPro  6.4  /  Alb  3.2<L>  /  TBili  0.6  /  DBili  x   /  AST  14  /  ALT  28  /  AlkPhos  66  10-28      LIVER FUNCTIONS - ( 28 Oct 2023 06:35 )  Alb: 3.2 g/dL / Pro: 6.4 g/dL / ALK PHOS: 66 U/L / ALT: 28 U/L / AST: 14 U/L / GGT: x           PTT - ( 28 Oct 2023 13:34 )  PTT:62.6 sec    Lactate, Blood: 3.3 mmol/L (10-28-23 @ 13:34)    Urinalysis Basic - ( 28 Oct 2023 06:35 )    Color: x / Appearance: x / SG: x / pH: x  Gluc: 192 mg/dL / Ketone: x  / Bili: x / Urobili: x   Blood: x / Protein: x / Nitrite: x   Leuk Esterase: x / RBC: x / WBC x   Sq Epi: x / Non Sq Epi: x / Bacteria: x  ======================Micro/Rad/Cardio=================  Telemtry: Reviewed   EKG: Reviewed  CXR: Reviewed  Culture: Reviewed   Echo:   Cath:   ======================================================  PAST MEDICAL & SURGICAL HISTORY:  CVA (cerebrovascular accident)      T2DM (type 2 diabetes mellitus)      HTN (hypertension)      HLD (hyperlipidemia)      S/P cystoscopy      S/P hernia repair  ====================ASSESSMENT ==============  57 yo Male pmhx CVA with mild left sided deficits, HTN, DM2, vertigo, HLD, Mobitz II s/pp Medtronic dual chamber ICD (12/21/22) initially presented to OSH for SOB c/f ADHF vs. PNA, later found to have NSTEMI transferred to [facility] for LHC evaluation. 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 Admitted to CICU for hypotension, inotropic support, hemodynamic monitoring.    NEUROLOGICAL  #Encephalopathy   Baseline A&Ox3, lately A&Ox2 (not oriented to time). Likely iso hypoperfusion iso acute on chronic HF vs. metabolic vs. CVA  -CT head 10/27 w/ atrophy and small vessel white matter ischemic changes. R medial cerebellar infarct may be acute vs subacute  -start inotropic support  -check blood gas  -neurology following:   -c/w asa and statin   -c/w heparin gtt for low EF and LV thrombus   -MRI brain and MRA H/N when stable  -b12, TSH, RPR for reversible causes of dementia   -neuro checks    #Hx of CVA w/ mild L residual deficit  -unclear if imaging finding acute vs subacute  -plan as above    CARDIOVASCULAR  #Hypotension/cardiogenic shock  Likely iso acute on chronic HFrEF  ECHO 10/16: EF 20%, severely reduced LVSF  LHC 10/16: 95% pLAD, 80% mid and distal LCx, 90% mid and distal RCA  RHC 10/19: RA 10, PCWP 20, CO 4.4, CI 2.3  cMR 10/18: limited viability in LAD territory  TTE 10/26: EF <20 %, LV apical thrombus is seen. Small pericardial effusion noted adjacent to the right ventricle with no evidence of hemodynamic compromise.  -start dobutamine   -consider swan placement to confirm/r/o impending cardiogenic shock  -trend lactate, proBNP  -c/w bumex 2 mg PO qd, for UO goal net neg 2-3L  -presently net positive, consider further bumex as BP allows  -s/p bumex 3 mg IV x1  -pending HF recs regarding high risk PCI vs further intervention; EP recommended no indication for upgrade at this time. HF holding off on launching VAD/transplant eval  -hold Entresto iso hypotension  -off hydral/nitrates  -s/p dig load; check dig level tomorrow  -avoid BB/CCB given borderline cardiac function  -start GDMT when appropriate    #LV apical thrombus  -c/w heparin gtt  -MRI brain and MRA H/N when stable  -neuro checks    #Tachycardia  Mobitz II s/p AICD  - Likely reflex tachycardiac iso increased cardiac demand/ hypoxia vs. iso infxn  - s/p dig load, check level jeffrey  - ctm  - per EP, no need to upgrade device at this time  - tele    #HLD  - c/w Atorv 80mg     RESPIRATORY  #Pulmonary edema  Likely iso CHF vs. superimposed PNA  CT chest 10/28: scattered patchy and nodular bilateral upper lobe predominant groundglass opacities with interlobular septal thickening representing pulmonary edema  -c/w diuresis for net neg goal  -infectious cause not excluded, low threshold to add abx if leukocytosis or febrile, as pt persistently tachy  -f/u noncontrast CT chest in 1-3 month to ensure resolution    GI/NUTRITION  No active issues  - CC DASH Halal diet  - GI ppx: PPI 40mg daily  - c/w Bowel regimen    /RENAL  #LAURA:   sCr on admission 1.28, uptrending likely iso CHF  -Trend sCr  -Monitor I/O  -c/w Bumex   -Avoid NSAIDs, ACEI/ARBS, RCA and nephrotoxins  -Renally dose medications    #Elevated lactate  - uptrending lactate noted likely iso increased cardiac demand/CHF  - monitor VS  - trend lactate  - plan as above    #BPH  - c/w Flomax 0.4mg qhs    SKIN  - no active issues    INFECTIOUS DISEASE  - No active issues  - plan for pulmonary edema as above  - Recent admission to OSH for ?PNA s/p 14 days of cefepime (last dose 10/14)  - Currently no signs of infection, WBC normal, afebrile, although monitor iso persistent tachycardia    ENDOCRINE    #DM2  - A1c 10/14 8.4%  - c/w Lantus 24u qhs + lispro 8u premeal + ISS  - trend FS, will likely need adjustment    #Thyroid nodule  CT chest: 3. 3 x 1.8 cm left supraclavicular nodule likely arising from the thyroid gland   -thyroid US outpatient    HEMATOLOGIC/DVT PPX  - No active issues  - DVT ppx: HSQ     #ETHICS  Full code?      Patient requires continuous monitoring with bedside rhythm monitoring, pulse ox monitoring, and intermittent blood gas analysis. Care plan discussed with ICU care team. Patient remained critical and at risk for life threatening decompensation.  Patient seen, examined and plan discussed with CCU team during rounds.     I have personally provided ____ minutes of critical care time excluding time spent on separate procedures, in addition to initial critical care time provided by the CICU Attending, Dr. Han.    By signing my name below, I, Judi Bai, attest that this documentation has been prepared under the direction and in the presence of VALERY Trejo.  Electronically signed: Naina Nayak, 10-28-23 @ 19:25    I, May Hanna , personally performed the services described in this documentation. all medical record entries made by the scribe were at my direction and in my presence. I have reviewed the chart and agree that the record reflects my personal performance and is accurate and complete  Electronically signed: VALERY Trejo.       BRANDEN MARRUFO  MRN-[id number]  Patient is a 58y old  Male who presents with a chief complaint of NSTEMI (28 Oct 2023 15:22)    HPI:  Patient with separate chart from [facility], MRN# [id number]    57 yo Male pmhx CVA with mild left sided deficits, HTN, DM2, vertigo, HLD, Mobitz II s/pp Medtronic dual chamber ICD (12/21/22) initially presented to [facility] from home with complaints of dyspnea and chest pain and was admitted w/ acute hypoxic respiratory failure likely due to acute pulmonary edema due to acute HFrEF in setting of acute NSTEMI, LAURA and enterovirus infection. Pt with brief MICU stay at [facility] requiring bipap (no intubation), was treated for PNA with cefepime, and was found to have TTE done 9/26/23 showing EF 20% with severely decreased LVSF, multiple regional wall motion abnormalities, and elevated LV end diastolic pressure. Pt was transferred to [facility] for cardiac cath evaluation. Patient without any acute complaints, complaining of intermittent palpitations for the last 2 days. No chest pain, SOB, fevers, nausea, vomiting, abdominal pain.  (13 Oct 2023 21:05)      Hospital Course:    24 HOUR EVENTS:    REVIEW OF SYSTEMS:    CONSTITUTIONAL: No weakness, fevers or chills  EYES/ENT: No visual changes;  No vertigo or throat pain   NECK: No pain or stiffness  RESPIRATORY: No cough, wheezing, hemoptysis; No shortness of breath  CARDIOVASCULAR: No chest pain or palpitations  GASTROINTESTINAL: No abdominal or epigastric pain. No nausea, vomiting, or hematemesis; No diarrhea or constipation. No melena or hematochezia.  GENITOURINARY: No dysuria, frequency or hematuria  NEUROLOGICAL: No numbness or weakness  SKIN: No itching, rashes      ICU Vital Signs Last 24 Hrs  T(C): 36.8 (28 Oct 2023 17:45), Max: 36.8 (28 Oct 2023 17:45)  T(F): 98.3 (28 Oct 2023 17:45), Max: 98.3 (28 Oct 2023 17:45)  HR: 130 (28 Oct 2023 18:00) (122 - 130)  BP: 105/74 (28 Oct 2023 18:00) (89/56 - 110/74)  BP(mean): 84 (28 Oct 2023 18:00) (84 - 84)  ABP: --  ABP(mean): --  RR: 24 (28 Oct 2023 18:00) (18 - 24)  SpO2: 94% (28 Oct 2023 18:00) (90% - 97%)    O2 Parameters below as of 28 Oct 2023 18:00  Patient On (Oxygen Delivery Method): room air            CVP(mm Hg): --  CO: --  CI: --  PA: --  PA(mean): --  PA(direct): --  PCWP: --  LA: --  RA: --  SVR: --  SVRI: --  PVR: --  PVRI: --  I&O's Summary    27 Oct 2023 07:01  -  28 Oct 2023 07:00  --------------------------------------------------------  IN: 1565 mL / OUT: 430 mL / NET: 1135 mL    28 Oct 2023 07:01  -  28 Oct 2023 19:25  --------------------------------------------------------  IN: 240 mL / OUT: 0 mL / NET: 240 mL        CAPILLARY BLOOD GLUCOSE    CAPILLARY BLOOD GLUCOSE      POCT Blood Glucose.: 324 mg/dL (28 Oct 2023 17:00)      PHYSICAL EXAM:  GENERAL: No acute distress, well-developed  HEAD:  Atraumatic, Normocephalic  EYES: EOMI, PERRLA, conjunctiva and sclera clear  NECK: Supple, no lymphadenopathy, no JVD  CHEST/LUNG: CTAB; No wheezes, rales, or rhonchi  HEART: Regular rate and rhythm. Normal S1/S2. No murmurs, rubs, or gallops  ABDOMEN: Soft, non-tender, non-distended; normal bowel sounds, no organomegaly  EXTREMITIES:  2+ peripheral pulses b/l, No clubbing, cyanosis, or edema  NEUROLOGY: A&O x 3, no focal deficits  SKIN: No rashes or lesions    ============================I/O===========================   I&O's Detail    27 Oct 2023 07:01  -  28 Oct 2023 07:00  --------------------------------------------------------  IN:    Heparin Infusion: 235 mL    Oral Fluid: 1330 mL  Total IN: 1565 mL    OUT:    Indwelling Catheter - Urethral (mL): 160 mL    Voided (mL): 270 mL  Total OUT: 430 mL    Total NET: 1135 mL      28 Oct 2023 07:01  -  28 Oct 2023 19:25  --------------------------------------------------------  IN:    Oral Fluid: 240 mL  Total IN: 240 mL    OUT:  Total OUT: 0 mL    Total NET: 240 mL        ============================ LABS =========================                        11.8   10.38 )-----------( 210      ( 28 Oct 2023 06:35 )             37.7     10-28    137  |  101  |  44<H>  ----------------------------<  192<H>  4.1   |  21<L>  |  1.74<H>    Ca    8.8      28 Oct 2023 06:35    TPro  6.4  /  Alb  3.2<L>  /  TBili  0.6  /  DBili  x   /  AST  14  /  ALT  28  /  AlkPhos  66  10-28      LIVER FUNCTIONS - ( 28 Oct 2023 06:35 )  Alb: 3.2 g/dL / Pro: 6.4 g/dL / ALK PHOS: 66 U/L / ALT: 28 U/L / AST: 14 U/L / GGT: x           PTT - ( 28 Oct 2023 13:34 )  PTT:62.6 sec    Lactate, Blood: 3.3 mmol/L (10-28-23 @ 13:34)    Urinalysis Basic - ( 28 Oct 2023 06:35 )    Color: x / Appearance: x / SG: x / pH: x  Gluc: 192 mg/dL / Ketone: x  / Bili: x / Urobili: x   Blood: x / Protein: x / Nitrite: x   Leuk Esterase: x / RBC: x / WBC x   Sq Epi: x / Non Sq Epi: x / Bacteria: x  ======================Micro/Rad/Cardio=================  Telemtry: Reviewed   EKG: Reviewed  CXR: Reviewed  Culture: Reviewed   Echo:   Cath:   ======================================================  PAST MEDICAL & SURGICAL HISTORY:  CVA (cerebrovascular accident)      T2DM (type 2 diabetes mellitus)      HTN (hypertension)      HLD (hyperlipidemia)      S/P cystoscopy      S/P hernia repair  ====================ASSESSMENT ==============  57 yo Male pmhx CVA with mild left sided deficits, HTN, DM2, vertigo, HLD, Mobitz II s/pp Medtronic dual chamber ICD (12/21/22) initially presented to OSH for SOB c/f ADHF vs. PNA, later found to have NSTEMI transferred to [facility] for LHC evaluation. 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 Admitted to CICU for hypotension, inotropic support, hemodynamic monitoring.    NEUROLOGICAL  #Encephalopathy   Baseline A&Ox3, lately A&Ox2 (not oriented to time). Likely iso hypoperfusion iso acute on chronic HF vs. metabolic vs. CVA  -CT head 10/27 w/ atrophy and small vessel white matter ischemic changes. R medial cerebellar infarct may be acute vs subacute  -start inotropic support  -check blood gas  -neurology following:   -c/w asa and statin   -c/w heparin gtt for low EF and LV thrombus   -MRI brain and MRA H/N when stable  -b12, TSH, RPR for reversible causes of dementia   -neuro checks    #Hx of CVA w/ mild L residual deficit  -unclear if imaging finding acute vs subacute  -plan as above    CARDIOVASCULAR  #Hypotension/cardiogenic shock  Likely iso acute on chronic HFrEF  ECHO 10/16: EF 20%, severely reduced LVSF  LHC 10/16: 95% pLAD, 80% mid and distal LCx, 90% mid and distal RCA  RHC 10/19: RA 10, PCWP 20, CO 4.4, CI 2.3  cMR 10/18: limited viability in LAD territory  TTE 10/26: EF <20 %, LV apical thrombus is seen. Small pericardial effusion noted adjacent to the right ventricle with no evidence of hemodynamic compromise.  -start dobutamine   -consider swan placement to confirm/r/o impending cardiogenic shock  -trend lactate, proBNP  -c/w bumex 2 mg PO qd, for UO goal net neg 2-3L  -presently net positive, consider further bumex as BP allows  -s/p bumex 3 mg IV x1  -pending HF recs regarding high risk PCI vs further intervention; EP recommended no indication for upgrade at this time. HF holding off on launching VAD/transplant eval  -hold Entresto iso hypotension  -off hydral/nitrates  -s/p dig load; check dig level tomorrow  -avoid BB/CCB given borderline cardiac function  -start GDMT when appropriate    #LV apical thrombus  -c/w heparin gtt  -MRI brain and MRA H/N when stable  -neuro checks    #Tachycardia  Mobitz II s/p AICD  - Likely reflex tachycardiac iso increased cardiac demand/ hypoxia vs. iso infxn  - s/p dig load, check level jeffrey  - ctm  - per EP, no need to upgrade device at this time  - tele    #HLD  - c/w Atorv 80mg     RESPIRATORY  #Pulmonary edema  Likely iso CHF vs. superimposed PNA  CT chest 10/28: scattered patchy and nodular bilateral upper lobe predominant groundglass opacities with interlobular septal thickening representing pulmonary edema  -c/w diuresis for net neg goal  -infectious cause not excluded, low threshold to add abx if leukocytosis or febrile, as pt persistently tachy  -f/u noncontrast CT chest in 1-3 month to ensure resolution    GI/NUTRITION  No active issues  - CC DASH Halal diet  - GI ppx: PPI 40mg daily  - c/w Bowel regimen    /RENAL  #LAURA:   sCr on admission 1.28, uptrending likely iso CHF  -Trend sCr  -Monitor I/O  -c/w Bumex   -Avoid NSAIDs, ACEI/ARBS, RCA and nephrotoxins  -Renally dose medications    #Elevated lactate  - uptrending lactate noted likely iso increased cardiac demand/CHF  - monitor VS  - trend lactate  - plan as above    #BPH  - c/w Flomax 0.4mg qhs    SKIN  - no active issues    INFECTIOUS DISEASE  - No active issues  - plan for pulmonary edema as above  - Recent admission to OSH for ?PNA s/p 14 days of cefepime (last dose 10/14)  - Currently no signs of infection, WBC normal, afebrile, although monitor iso persistent tachycardia    ENDOCRINE    #DM2  - A1c 10/14 8.4%  - c/w Lantus 24u qhs + lispro 8u premeal + ISS  - trend FS, will likely need adjustment    #Thyroid nodule  CT chest: 3. 3 x 1.8 cm left supraclavicular nodule likely arising from the thyroid gland   -thyroid US outpatient    HEMATOLOGIC/DVT PPX  - No active issues  - DVT ppx: HSQ     #ETHICS  Full code?      Patient requires continuous monitoring with bedside rhythm monitoring, pulse ox monitoring, and intermittent blood gas analysis. Care plan discussed with ICU care team. Patient remained critical and at risk for life threatening decompensation.  Patient seen, examined and plan discussed with CCU team during rounds.     I have personally provided ____ minutes of critical care time excluding time spent on separate procedures, in addition to initial critical care time provided by the CICU Attending, Dr. Padgett.  By signing my name below, I, Judi Bai, attest that this documentation has been prepared under the direction and in the presence of VALERY Trejo.  Electronically signed: Naina Nayak, 10-28-23 @ 19:25    I, May Hanna , personally performed the services described in this documentation. all medical record entries made by the scribe were at my direction and in my presence. I have reviewed the chart and agree that the record reflects my personal performance and is accurate and complete  Electronically signed: VALERY Trejo.       BRANDEN MARRUFO  MRN-[id number]  Patient is a 58y old  Male who presents with a chief complaint of NSTEMI (28 Oct 2023 15:22)    HPI:  Patient with separate chart from [facility], MRN# [id number]    59 yo Male pmhx CVA with mild left sided deficits, HTN, DM2, vertigo, HLD, Mobitz II s/pp Medtronic dual chamber ICD (12/21/22) initially presented to [facility] from home with complaints of dyspnea and chest pain and was admitted w/ acute hypoxic respiratory failure likely due to acute pulmonary edema due to acute HFrEF in setting of acute NSTEMI, LAURA and enterovirus infection. Pt with brief MICU stay at [facility] requiring bipap (no intubation), was treated for PNA with cefepime, and was found to have TTE done 9/26/23 showing EF 20% with severely decreased LVSF, multiple regional wall motion abnormalities, and elevated LV end diastolic pressure. Pt was transferred to [facility] for cardiac cath evaluation. Patient without any acute complaints, complaining of intermittent palpitations for the last 2 days. No chest pain, SOB, fevers, nausea, vomiting, abdominal pain.  (13 Oct 2023 21:05)    24 HOUR EVENTS:  - readmitted to CICU s/p RRT on floors for hypotension and AMS    ICU Vital Signs Last 24 Hrs  T(C): 36.8 (28 Oct 2023 17:45), Max: 36.8 (28 Oct 2023 17:45)  T(F): 98.3 (28 Oct 2023 17:45), Max: 98.3 (28 Oct 2023 17:45)  HR: 130 (28 Oct 2023 18:00) (122 - 130)  BP: 105/74 (28 Oct 2023 18:00) (89/56 - 110/74)  BP(mean): 84 (28 Oct 2023 18:00) (84 - 84)  RR: 24 (28 Oct 2023 18:00) (18 - 24)  SpO2: 94% (28 Oct 2023 18:00) (90% - 97%)    O2 Parameters below as of 28 Oct 2023 18:00  Patient On (Oxygen Delivery Method): room air    I&O's Summary    27 Oct 2023 07:01  -  28 Oct 2023 07:00  --------------------------------------------------------  IN: 1565 mL / OUT: 430 mL / NET: 1135 mL    28 Oct 2023 07:01  -  28 Oct 2023 19:25  --------------------------------------------------------  IN: 240 mL / OUT: 0 mL / NET: 240 mL    POCT Blood Glucose.: 324 mg/dL (28 Oct 2023 17:00)    PHYSICAL EXAM:  GENERAL: No acute distress, well-developed  HEAD:  Atraumatic, Normocephalic  EYES: EOMI, PERRLA, conjunctiva and sclera clear  NECK: Supple, no lymphadenopathy, no JVD  CHEST/LUNG: CTAB; No wheezes, rales, or rhonchi  HEART: Tachycardia. Normal S1/S2. No murmurs, rubs, or gallops  ABDOMEN: Soft, non-tender, non-distended; normal bowel sounds, no organomegaly  EXTREMITIES:  2+ peripheral pulses b/l, No clubbing, cyanosis, or edema  NEUROLOGY: A&O x 3, no focal deficits    ============================I/O===========================   I&O's Detail    27 Oct 2023 07:01  -  28 Oct 2023 07:00  --------------------------------------------------------  IN:    Heparin Infusion: 235 mL    Oral Fluid: 1330 mL  Total IN: 1565 mL    OUT:    Indwelling Catheter - Urethral (mL): 160 mL    Voided (mL): 270 mL  Total OUT: 430 mL    Total NET: 1135 mL      28 Oct 2023 07:01  -  28 Oct 2023 19:25  --------------------------------------------------------  IN:    Oral Fluid: 240 mL  Total IN: 240 mL    OUT:  Total OUT: 0 mL    Total NET: 240 mL    ============================ LABS =========================                        11.8   10.38 )-----------( 210      ( 28 Oct 2023 06:35 )             37.7     10-28    137  |  101  |  44<H>  ----------------------------<  192<H>  4.1   |  21<L>  |  1.74<H>    Ca    8.8      28 Oct 2023 06:35    TPro  6.4  /  Alb  3.2<L>  /  TBili  0.6  /  DBili  x   /  AST  14  /  ALT  28  /  AlkPhos  66  10-28      LIVER FUNCTIONS - ( 28 Oct 2023 06:35 )  Alb: 3.2 g/dL / Pro: 6.4 g/dL / ALK PHOS: 66 U/L / ALT: 28 U/L / AST: 14 U/L / GGT: x           PTT - ( 28 Oct 2023 13:34 )  PTT:62.6 sec    Lactate, Blood: 3.3 mmol/L (10-28-23 @ 13:34)    Urinalysis Basic - ( 28 Oct 2023 06:35 )    Color: x / Appearance: x / SG: x / pH: x  Gluc: 192 mg/dL / Ketone: x  / Bili: x / Urobili: x   Blood: x / Protein: x / Nitrite: x   Leuk Esterase: x / RBC: x / WBC x   Sq Epi: x / Non Sq Epi: x / Bacteria: x  ======================Micro/Rad/Cardio=================  Telemtry: Reviewed   EKG: Reviewed  CXR: Reviewed  Culture: Reviewed   Echo:   Cath:   ======================================================  PAST MEDICAL & SURGICAL HISTORY:  CVA (cerebrovascular accident)      T2DM (type 2 diabetes mellitus)      HTN (hypertension)      HLD (hyperlipidemia)      S/P cystoscopy      S/P hernia repair    ====================ASSESSMENT ==============  59 yo Male pmhx CVA with mild left sided deficits, HTN, DM2, vertigo, HLD, Mobitz II s/pp Medtronic dual chamber ICD (12/21/22) initially presented to OSH for SOB c/f ADHF vs. PNA, later found to have NSTEMI transferred to [facility] for C evaluation. 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 Admitted to CICU for hypotension, inotropic support, hemodynamic monitoring.    NEUROLOGICAL  #Encephalopathy   - currently A&Ox3  -CT head 10/27 w/ atrophy and small vessel white matter ischemic changes. R medial cerebellar infarct may be acute vs subacute  - pending MRI tomorrow 10/29 5PM  -neurology following  -b12, TSH, RPR for reversible causes of dementia   -neuro checks    #Hx of CVA w/ mild L residual deficit  -unclear if imaging finding acute vs subacute  -plan as above    CARDIOVASCULAR  #Hypotension/cardiogenic shock, iso acute on chronic HFrEF  TTE 10/16: EF 20%, severely reduced LVSF  LHC 10/16: 95% pLAD, 80% mid and distal LCx, 90% mid and distal RCA  RHC 10/19: RA 10, PCWP 20, CO 4.4, CI 2.3  cMR 10/18: limited viability in LAD territory  TTE 10/26: EF <20 %, LV apical thrombus is seen. Small pericardial effusion noted adjacent to the right ventricle with no evidence of hemodynamic compromise.  -milrinone started  -attempted to place central line, RIJ with visible thrombus on US & LIJ small  -trend lactate, proBNP  -c/w bumex 2 mg PO qd, for UO goal net neg 2-3L  -presently net positive, consider further bumex as BP allows  -s/p bumex 3 mg IV x1  -pending HF recs regarding high risk PCI vs further intervention; EP recommended no indication for upgrade at this time. HF holding off on launching VAD/transplant eval  -hold Entresto i/s/o hypotension  -off hydral/nitrates  -s/p dig load  -avoid BB/CCB given borderline cardiac function  -start GDMT when appropriate    #LV apical thrombus  -c/w heparin gtt  -MRI brain and MRA H/N when stable  -neuro checks    #Tachycardia  Mobitz II s/p AICD  - Likely reflex tachycardiac iso increased cardiac demand/ hypoxia vs. iso infxn  - s/p dig load  - per EP, no need to upgrade device at this time    #HLD  - c/w Atorv 80mg     RESPIRATORY  #Pulmonary edema  Likely iso CHF vs. superimposed PNA  CT chest 10/28: scattered patchy and nodular bilateral upper lobe predominant groundglass opacities with interlobular septal thickening representing pulmonary edema  -c/w diuresis for net neg goal  -infectious cause not excluded, low threshold to add abx if leukocytosis or febrile, as pt persistently tachy  -f/u noncontrast CT chest in 1-3 month to ensure resolution    GI/NUTRITION  No active issues  - CC DASH Halal diet  - GI ppx: PPI 40mg daily  - c/w Bowel regimen    /RENAL  #LAURA:   - sCr on admission 1.28, uptrending likely iso low flow state  -Trend sCr  -Monitor I/O  -c/w diuresis    #Elevated lactate  - uptrending lactate noted likely iso cardiogenic shock  - continue to trend    #BPH  - c/w Flomax 0.4mg qhs    SKIN  - no active issues    INFECTIOUS DISEASE  - No active issues  - plan for pulmonary edema as above  - Recent admission to OSH for ?PNA s/p 14 days of cefepime (last dose 10/14)  - Currently no signs of infection, WBC normal, afebrile, although monitor iso persistent tachycardia    ENDOCRINE    #DM2  - A1c 10/14 8.4%  - c/w Lantus 24u qhs + lispro 8u premeal + ISS  - trend FS, will likely need adjustment    #Thyroid nodule  CT chest: 3. 3 x 1.8 cm left supraclavicular nodule likely arising from the thyroid gland   -thyroid US outpatient    HEMATOLOGIC/DVT PPX  - No active issues  - DVT ppx: heparin gtt    #ETHICS  Full code      Patient requires continuous monitoring with bedside rhythm monitoring, pulse ox monitoring, and intermittent blood gas analysis. Care plan discussed with ICU care team. Patient remained critical and at risk for life threatening decompensation.  Patient seen, examined and plan discussed with CCU team during rounds.     I have personally provided 30 minutes of critical care time excluding time spent on separate procedures, in addition to initial critical care time provided by the CICU Attending, Dr. Padgett.  By signing my name below, I, Judi Bai, attest that this documentation has been prepared under the direction and in the presence of VALERY Trejo.  Electronically signed: Ashley Nayak, 10-28-23 @ 19:25    I, May Hanna , personally performed the services described in this documentation. all medical record entries made by the ashley were at my direction and in my presence. I have reviewed the chart and agree that the record reflects my personal performance and is accurate and complete  Electronically signed: VALERY Trejo.       BRANDEN MARRUFO  MRN-[id number]  Patient is a 58y old  Male who presents with a chief complaint of NSTEMI (28 Oct 2023 15:22)    HPI:  Patient with separate chart from [facility], MRN# [id number]    59 yo Male pmhx CVA with mild left sided deficits, HTN, DM2, vertigo, HLD, Mobitz II s/pp Medtronic dual chamber ICD (12/21/22) initially presented to [facility] from home with complaints of dyspnea and chest pain and was admitted w/ acute hypoxic respiratory failure likely due to acute pulmonary edema due to acute HFrEF in setting of acute NSTEMI, LAURA and enterovirus infection. Pt with brief MICU stay at [facility] requiring bipap (no intubation), was treated for PNA with cefepime, and was found to have TTE done 9/26/23 showing EF 20% with severely decreased LVSF, multiple regional wall motion abnormalities, and elevated LV end diastolic pressure. Pt was transferred to [facility] for cardiac cath evaluation. Patient without any acute complaints, complaining of intermittent palpitations for the last 2 days. No chest pain, SOB, fevers, nausea, vomiting, abdominal pain.  (13 Oct 2023 21:05)    24 HOUR EVENTS:  - readmitted to CICU s/p RRT on floors for hypotension and AMS    ICU Vital Signs Last 24 Hrs  T(C): 36.8 (28 Oct 2023 17:45), Max: 36.8 (28 Oct 2023 17:45)  T(F): 98.3 (28 Oct 2023 17:45), Max: 98.3 (28 Oct 2023 17:45)  HR: 130 (28 Oct 2023 18:00) (122 - 130)  BP: 105/74 (28 Oct 2023 18:00) (89/56 - 110/74)  BP(mean): 84 (28 Oct 2023 18:00) (84 - 84)  RR: 24 (28 Oct 2023 18:00) (18 - 24)  SpO2: 94% (28 Oct 2023 18:00) (90% - 97%)    O2 Parameters below as of 28 Oct 2023 18:00  Patient On (Oxygen Delivery Method): room air    I&O's Summary    27 Oct 2023 07:01  -  28 Oct 2023 07:00  --------------------------------------------------------  IN: 1565 mL / OUT: 430 mL / NET: 1135 mL    28 Oct 2023 07:01  -  28 Oct 2023 19:25  --------------------------------------------------------  IN: 240 mL / OUT: 0 mL / NET: 240 mL    POCT Blood Glucose.: 324 mg/dL (28 Oct 2023 17:00)    PHYSICAL EXAM:  GENERAL: No acute distress, well-developed  HEAD:  Atraumatic, Normocephalic  EYES: EOMI, PERRLA, conjunctiva and sclera clear  NECK: Supple, no lymphadenopathy, no JVD  CHEST/LUNG: CTAB; No wheezes, rales, or rhonchi  HEART: Tachycardia. Normal S1/S2. No murmurs, rubs, or gallops  ABDOMEN: Soft, non-tender, non-distended; normal bowel sounds, no organomegaly  EXTREMITIES:  2+ peripheral pulses b/l, No clubbing, cyanosis, or edema  NEUROLOGY: A&O x 3, no focal deficits    ============================I/O===========================   I&O's Detail    27 Oct 2023 07:01  -  28 Oct 2023 07:00  --------------------------------------------------------  IN:    Heparin Infusion: 235 mL    Oral Fluid: 1330 mL  Total IN: 1565 mL    OUT:    Indwelling Catheter - Urethral (mL): 160 mL    Voided (mL): 270 mL  Total OUT: 430 mL    Total NET: 1135 mL      28 Oct 2023 07:01  -  28 Oct 2023 19:25  --------------------------------------------------------  IN:    Oral Fluid: 240 mL  Total IN: 240 mL    OUT:  Total OUT: 0 mL    Total NET: 240 mL    ============================ LABS =========================                        11.8   10.38 )-----------( 210      ( 28 Oct 2023 06:35 )             37.7     10-28    137  |  101  |  44<H>  ----------------------------<  192<H>  4.1   |  21<L>  |  1.74<H>    Ca    8.8      28 Oct 2023 06:35    TPro  6.4  /  Alb  3.2<L>  /  TBili  0.6  /  DBili  x   /  AST  14  /  ALT  28  /  AlkPhos  66  10-28      LIVER FUNCTIONS - ( 28 Oct 2023 06:35 )  Alb: 3.2 g/dL / Pro: 6.4 g/dL / ALK PHOS: 66 U/L / ALT: 28 U/L / AST: 14 U/L / GGT: x           PTT - ( 28 Oct 2023 13:34 )  PTT:62.6 sec    Lactate, Blood: 3.3 mmol/L (10-28-23 @ 13:34)    Urinalysis Basic - ( 28 Oct 2023 06:35 )    Color: x / Appearance: x / SG: x / pH: x  Gluc: 192 mg/dL / Ketone: x  / Bili: x / Urobili: x   Blood: x / Protein: x / Nitrite: x   Leuk Esterase: x / RBC: x / WBC x   Sq Epi: x / Non Sq Epi: x / Bacteria: x  ======================Micro/Rad/Cardio=================  Telemtry: Reviewed   EKG: Reviewed  CXR: Reviewed  Culture: Reviewed   Echo:   Cath:   ======================================================  PAST MEDICAL & SURGICAL HISTORY:  CVA (cerebrovascular accident)      T2DM (type 2 diabetes mellitus)      HTN (hypertension)      HLD (hyperlipidemia)      S/P cystoscopy      S/P hernia repair    ====================ASSESSMENT ==============  59 yo Male pmhx CVA with mild left sided deficits, HTN, DM2, vertigo, HLD, Mobitz II s/pp Medtronic dual chamber ICD (12/21/22) initially presented to OSH for SOB c/f ADHF vs. PNA, later found to have NSTEMI transferred to [facility] for C evaluation. 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 Admitted to CICU for hypotension, inotropic support, hemodynamic monitoring.    NEUROLOGICAL  #Encephalopathy   - currently A&Ox3  -CT head 10/27 w/ atrophy and small vessel white matter ischemic changes. R medial cerebellar infarct may be acute vs subacute  - pending MRI tomorrow 10/29 5PM  -neurology following  -b12, TSH, RPR for reversible causes of dementia   -neuro checks    #Hx of CVA w/ mild L residual deficit  -unclear if imaging finding acute vs subacute  -plan as above    CARDIOVASCULAR  #Hypotension/cardiogenic shock, iso acute on chronic HFrEF  TTE 10/16: EF 20%, severely reduced LVSF  LHC 10/16: 95% pLAD, 80% mid and distal LCx, 90% mid and distal RCA  RHC 10/19: RA 10, PCWP 20, CO 4.4, CI 2.3  cMR 10/18: limited viability in LAD territory  TTE 10/26: EF <20 %, LV apical thrombus is seen. Small pericardial effusion noted adjacent to the right ventricle with no evidence of hemodynamic compromise.  -milrinone .125 started  -attempted to place central line, RIJ with visible thrombus on US & LIJ small  -trend lactate, proBNP  -c/w bumex 2 mg PO qd, for UO goal net neg 2-3L  -presently net positive, consider further bumex as BP allows  -s/p bumex 3 mg IV x1  -pending HF recs regarding high risk PCI vs further intervention; EP recommended no indication for upgrade at this time. HF holding off on launching VAD/transplant eval  -hold Entresto i/s/o hypotension  -off hydral/nitrates  -s/p dig load  -avoid BB/CCB given borderline cardiac function  -start GDMT when appropriate    #LV apical thrombus  -c/w heparin gtt  -MRI brain and MRA H/N when stable  -neuro checks    #Tachycardia  Mobitz II s/p AICD  - Likely reflex tachycardiac iso increased cardiac demand/ hypoxia vs. iso infxn  - s/p dig load  - per EP, no need to upgrade device at this time    #HLD  - c/w Atorv 80mg     RESPIRATORY  #Pulmonary edema  Likely iso CHF vs. superimposed PNA  CT chest 10/28: scattered patchy and nodular bilateral upper lobe predominant groundglass opacities with interlobular septal thickening representing pulmonary edema  -c/w diuresis for net neg goal  -infectious cause not excluded, low threshold to add abx if leukocytosis or febrile, as pt persistently tachy  -f/u noncontrast CT chest in 1-3 month to ensure resolution    GI/NUTRITION  No active issues  - CC DASH Halal diet  - GI ppx: PPI 40mg daily  - c/w Bowel regimen    /RENAL  #LAURA:   - sCr on admission 1.28, uptrending likely iso low flow state  -Trend sCr  -Monitor I/O  -c/w diuresis    #Elevated lactate  - uptrending lactate noted likely iso cardiogenic shock  - continue to trend    #BPH  - c/w Flomax 0.4mg qhs    SKIN  - no active issues    INFECTIOUS DISEASE  - No active issues  - plan for pulmonary edema as above  - Recent admission to OSH for ?PNA s/p 14 days of cefepime (last dose 10/14)  - Currently no signs of infection, WBC normal, afebrile, although monitor iso persistent tachycardia    ENDOCRINE    #DM2  - A1c 10/14 8.4%  - c/w Lantus 24u qhs + lispro 8u premeal + ISS  - trend FS, will likely need adjustment    #Thyroid nodule  CT chest: 3. 3 x 1.8 cm left supraclavicular nodule likely arising from the thyroid gland   -thyroid US outpatient    HEMATOLOGIC/DVT PPX  - No active issues  - DVT ppx: heparin gtt    #ETHICS  Full code      Patient requires continuous monitoring with bedside rhythm monitoring, pulse ox monitoring, and intermittent blood gas analysis. Care plan discussed with ICU care team. Patient remained critical and at risk for life threatening decompensation.  Patient seen, examined and plan discussed with CCU team during rounds.     I have personally provided 30 minutes of critical care time excluding time spent on separate procedures, in addition to initial critical care time provided by the CICU Attending, Dr. Padgett.  By signing my name below, I, Judi Bai, attest that this documentation has been prepared under the direction and in the presence of VALERY Trejo.  Electronically signed: Ashley Nayak, 10-28-23 @ 19:25    I, May Hanna , personally performed the services described in this documentation. all medical record entries made by the ashley were at my direction and in my presence. I have reviewed the chart and agree that the record reflects my personal performance and is accurate and complete  Electronically signed: VALERY Trejo.

## 2023-10-28 NOTE — PROGRESS NOTE ADULT - SUBJECTIVE AND OBJECTIVE BOX
[facility] Division of Hospital Medicine  Jaun Junior DO  Pager (M-F, 8A-5P):  MS Teams PREFERRED        SUBJECTIVE / OVERNIGHT EVENTS:  Pt seen at bedside, no acute distress--> he wants to finalize plan with cardiology/neurology regarding dispo/intervention. Denies chest pain, nausea, vomiting.     MEDICATIONS  (STANDING):  aspirin enteric coated 81 milliGRAM(s) Oral daily  atorvastatin 80 milliGRAM(s) Oral at bedtime  buMETAnide 2 milliGRAM(s) Oral daily  chlorhexidine 2% Cloths 1 Application(s) Topical daily  dapagliflozin 10 milliGRAM(s) Oral every 24 hours  dextrose 5%. 1000 milliLiter(s) (100 mL/Hr) IV Continuous <Continuous>  dextrose 5%. 1000 milliLiter(s) (50 mL/Hr) IV Continuous <Continuous>  dextrose 50% Injectable 12.5 Gram(s) IV Push once  dextrose 50% Injectable 25 Gram(s) IV Push once  dextrose 50% Injectable 25 Gram(s) IV Push once  glucagon  Injectable 1 milliGRAM(s) IntraMuscular once  heparin  Infusion.  Unit(s)/Hr (12 mL/Hr) IV Continuous <Continuous>  insulin glargine Injectable (LANTUS) 24 Unit(s) SubCutaneous at bedtime  insulin lispro (ADMELOG) corrective regimen sliding scale   SubCutaneous three times a day before meals  insulin lispro (ADMELOG) corrective regimen sliding scale   SubCutaneous at bedtime  insulin lispro Injectable (ADMELOG) 8 Unit(s) SubCutaneous three times a day before meals  pantoprazole    Tablet 40 milliGRAM(s) Oral before breakfast  sacubitril 24 mG/valsartan 26 mG 1 Tablet(s) Oral two times a day  senna 2 Tablet(s) Oral at bedtime  tamsulosin 0.4 milliGRAM(s) Oral at bedtime    MEDICATIONS  (PRN):  acetaminophen     Tablet .. 650 milliGRAM(s) Oral every 6 hours PRN Temp greater or equal to 38C (100.4F), Mild Pain (1 - 3)  albuterol    90 MICROgram(s) HFA Inhaler 2 Puff(s) Inhalation every 6 hours PRN Shortness of Breath and/or Wheezing  benzocaine/menthol Lozenge 1 Lozenge Oral every 2 hours PRN Sore Throat  dextrose Oral Gel 15 Gram(s) Oral once PRN Blood Glucose LESS THAN 70 milliGRAM(s)/deciliter  guaiFENesin Oral Liquid (Sugar-Free) 100 milliGRAM(s) Oral every 6 hours PRN Cough  heparin   Injectable 2500 Unit(s) IV Push every 6 hours PRN For aPTT between 40 - 57  heparin   Injectable 5500 Unit(s) IV Push every 6 hours PRN For aPTT less than 40  polyethylene glycol 3350 17 Gram(s) Oral daily PRN Constipation      I&O's Summary    27 Oct 2023 07:01  -  28 Oct 2023 07:00  --------------------------------------------------------  IN: 1565 mL / OUT: 430 mL / NET: 1135 mL    28 Oct 2023 07:01  -  28 Oct 2023 15:22  --------------------------------------------------------  IN: 240 mL / OUT: 0 mL / NET: 240 mL        PHYSICAL EXAM:  Vital Signs Last 24 Hrs  T(C): 36.6 (28 Oct 2023 13:08), Max: 36.7 (27 Oct 2023 19:16)  T(F): 97.9 (28 Oct 2023 13:08), Max: 98.1 (27 Oct 2023 19:16)  HR: 125 (28 Oct 2023 13:08) (123 - 125)  BP: 89/56 (28 Oct 2023 13:08) (89/56 - 110/74)  BP(mean): --  RR: 18 (28 Oct 2023 13:08) (18 - 20)  SpO2: 97% (28 Oct 2023 13:08) (90% - 97%)    Parameters below as of 28 Oct 2023 05:21  Patient On (Oxygen Delivery Method): room air      Appearance: No acute distress; well appearing, breathless when speaking   Eyes: PERRL, EOMI, pink conjunctiva  HEENT: Normal oral mucosa  Cardiovascular: RRR, S1, S2, no murmurs, rubs, or gallops; no edema; JVD to mid neck   Respiratory: Mild crackles in the bases  Gastrointestinal: soft, non-tender, non-distended with normal bowel sounds  Musculoskeletal: No clubbing; no joint deformity   Neurologic: Non-focal  Lymphatic: No lymphadenopathy  Psychiatry: Mildly confused, AAO x2-3, no FND   Skin: No rashes, ecchymoses, or cyanosis    LABS:                        11.8   10.38 )-----------( 210      ( 28 Oct 2023 06:35 )             37.7     10-28    137  |  101  |  44<H>  ----------------------------<  192<H>  4.1   |  21<L>  |  1.74<H>    Ca    8.8      28 Oct 2023 06:35    TPro  6.4  /  Alb  3.2<L>  /  TBili  0.6  /  DBili  x   /  AST  14  /  ALT  28  /  AlkPhos  66  10-28    PTT - ( 28 Oct 2023 13:34 )  PTT:62.6 sec      Urinalysis Basic - ( 28 Oct 2023 06:35 )    Color: x / Appearance: x / SG: x / pH: x  Gluc: 192 mg/dL / Ketone: x  / Bili: x / Urobili: x   Blood: x / Protein: x / Nitrite: x   Leuk Esterase: x / RBC: x / WBC x   Sq Epi: x / Non Sq Epi: x / Bacteria: x          RADIOLOGY & ADDITIONAL TESTS:  Results Reviewed: CBC/CMP personally reviewed by me Hgb 11.8, WBC 10.38 Cr 1.74  Imaging Personally Reviewed:  Electrocardiogram Personally Reviewed:    COORDINATION OF CARE:  Care Discussed with Consultants/Other Providers [Y/N]: Y  Prior or Outpatient Records Reviewed [Y/N]: Y

## 2023-10-29 DIAGNOSIS — R00.0 TACHYCARDIA, UNSPECIFIED: ICD-10-CM

## 2023-10-29 LAB
ALBUMIN SERPL ELPH-MCNC: 2.7 G/DL — LOW (ref 3.3–5)
ALBUMIN SERPL ELPH-MCNC: 2.7 G/DL — LOW (ref 3.3–5)
ALBUMIN SERPL ELPH-MCNC: 2.9 G/DL — LOW (ref 3.3–5)
ALBUMIN SERPL ELPH-MCNC: 2.9 G/DL — LOW (ref 3.3–5)
ALP SERPL-CCNC: 56 U/L — SIGNIFICANT CHANGE UP (ref 40–120)
ALP SERPL-CCNC: 56 U/L — SIGNIFICANT CHANGE UP (ref 40–120)
ALP SERPL-CCNC: 59 U/L — SIGNIFICANT CHANGE UP (ref 40–120)
ALP SERPL-CCNC: 59 U/L — SIGNIFICANT CHANGE UP (ref 40–120)
ALT FLD-CCNC: 24 U/L — SIGNIFICANT CHANGE UP (ref 10–45)
ALT FLD-CCNC: 24 U/L — SIGNIFICANT CHANGE UP (ref 10–45)
ALT FLD-CCNC: 25 U/L — SIGNIFICANT CHANGE UP (ref 10–45)
ALT FLD-CCNC: 25 U/L — SIGNIFICANT CHANGE UP (ref 10–45)
ANION GAP SERPL CALC-SCNC: 11 MMOL/L — SIGNIFICANT CHANGE UP (ref 5–17)
ANION GAP SERPL CALC-SCNC: 11 MMOL/L — SIGNIFICANT CHANGE UP (ref 5–17)
ANION GAP SERPL CALC-SCNC: 13 MMOL/L — SIGNIFICANT CHANGE UP (ref 5–17)
ANION GAP SERPL CALC-SCNC: 13 MMOL/L — SIGNIFICANT CHANGE UP (ref 5–17)
APTT BLD: 56 SEC — HIGH (ref 24.5–35.6)
APTT BLD: 56 SEC — HIGH (ref 24.5–35.6)
AST SERPL-CCNC: 13 U/L — SIGNIFICANT CHANGE UP (ref 10–40)
AST SERPL-CCNC: 13 U/L — SIGNIFICANT CHANGE UP (ref 10–40)
AST SERPL-CCNC: 16 U/L — SIGNIFICANT CHANGE UP (ref 10–40)
AST SERPL-CCNC: 16 U/L — SIGNIFICANT CHANGE UP (ref 10–40)
BILIRUB SERPL-MCNC: 0.3 MG/DL — SIGNIFICANT CHANGE UP (ref 0.2–1.2)
BILIRUB SERPL-MCNC: 0.3 MG/DL — SIGNIFICANT CHANGE UP (ref 0.2–1.2)
BILIRUB SERPL-MCNC: 0.5 MG/DL — SIGNIFICANT CHANGE UP (ref 0.2–1.2)
BILIRUB SERPL-MCNC: 0.5 MG/DL — SIGNIFICANT CHANGE UP (ref 0.2–1.2)
BUN SERPL-MCNC: 42 MG/DL — HIGH (ref 7–23)
BUN SERPL-MCNC: 42 MG/DL — HIGH (ref 7–23)
BUN SERPL-MCNC: 43 MG/DL — HIGH (ref 7–23)
BUN SERPL-MCNC: 43 MG/DL — HIGH (ref 7–23)
CALCIUM SERPL-MCNC: 8.4 MG/DL — SIGNIFICANT CHANGE UP (ref 8.4–10.5)
CALCIUM SERPL-MCNC: 8.4 MG/DL — SIGNIFICANT CHANGE UP (ref 8.4–10.5)
CALCIUM SERPL-MCNC: 8.8 MG/DL — SIGNIFICANT CHANGE UP (ref 8.4–10.5)
CALCIUM SERPL-MCNC: 8.8 MG/DL — SIGNIFICANT CHANGE UP (ref 8.4–10.5)
CHLORIDE SERPL-SCNC: 104 MMOL/L — SIGNIFICANT CHANGE UP (ref 96–108)
CO2 SERPL-SCNC: 21 MMOL/L — LOW (ref 22–31)
CO2 SERPL-SCNC: 21 MMOL/L — LOW (ref 22–31)
CO2 SERPL-SCNC: 22 MMOL/L — SIGNIFICANT CHANGE UP (ref 22–31)
CO2 SERPL-SCNC: 22 MMOL/L — SIGNIFICANT CHANGE UP (ref 22–31)
CREAT SERPL-MCNC: 1.79 MG/DL — HIGH (ref 0.5–1.3)
CREAT SERPL-MCNC: 1.79 MG/DL — HIGH (ref 0.5–1.3)
CREAT SERPL-MCNC: 1.88 MG/DL — HIGH (ref 0.5–1.3)
CREAT SERPL-MCNC: 1.88 MG/DL — HIGH (ref 0.5–1.3)
DIGOXIN SERPL-MCNC: 1.8 NG/ML — SIGNIFICANT CHANGE UP (ref 0.8–2)
DIGOXIN SERPL-MCNC: 1.8 NG/ML — SIGNIFICANT CHANGE UP (ref 0.8–2)
EGFR: 41 ML/MIN/1.73M2 — LOW
EGFR: 41 ML/MIN/1.73M2 — LOW
EGFR: 44 ML/MIN/1.73M2 — LOW
EGFR: 44 ML/MIN/1.73M2 — LOW
GLUCOSE BLDC GLUCOMTR-MCNC: 157 MG/DL — HIGH (ref 70–99)
GLUCOSE BLDC GLUCOMTR-MCNC: 157 MG/DL — HIGH (ref 70–99)
GLUCOSE BLDC GLUCOMTR-MCNC: 191 MG/DL — HIGH (ref 70–99)
GLUCOSE BLDC GLUCOMTR-MCNC: 191 MG/DL — HIGH (ref 70–99)
GLUCOSE BLDC GLUCOMTR-MCNC: 200 MG/DL — HIGH (ref 70–99)
GLUCOSE BLDC GLUCOMTR-MCNC: 200 MG/DL — HIGH (ref 70–99)
GLUCOSE BLDC GLUCOMTR-MCNC: 284 MG/DL — HIGH (ref 70–99)
GLUCOSE BLDC GLUCOMTR-MCNC: 284 MG/DL — HIGH (ref 70–99)
GLUCOSE SERPL-MCNC: 216 MG/DL — HIGH (ref 70–99)
GLUCOSE SERPL-MCNC: 216 MG/DL — HIGH (ref 70–99)
GLUCOSE SERPL-MCNC: 223 MG/DL — HIGH (ref 70–99)
GLUCOSE SERPL-MCNC: 223 MG/DL — HIGH (ref 70–99)
HCT VFR BLD CALC: 36.3 % — LOW (ref 39–50)
HCT VFR BLD CALC: 36.3 % — LOW (ref 39–50)
HGB BLD-MCNC: 11.5 G/DL — LOW (ref 13–17)
HGB BLD-MCNC: 11.5 G/DL — LOW (ref 13–17)
INR BLD: 1.24 RATIO — HIGH (ref 0.85–1.18)
INR BLD: 1.24 RATIO — HIGH (ref 0.85–1.18)
LACTATE SERPL-SCNC: 1.8 MMOL/L — SIGNIFICANT CHANGE UP (ref 0.5–2)
LACTATE SERPL-SCNC: 1.8 MMOL/L — SIGNIFICANT CHANGE UP (ref 0.5–2)
LACTATE SERPL-SCNC: 1.9 MMOL/L — SIGNIFICANT CHANGE UP (ref 0.5–2)
LACTATE SERPL-SCNC: 1.9 MMOL/L — SIGNIFICANT CHANGE UP (ref 0.5–2)
MAGNESIUM SERPL-MCNC: 1.9 MG/DL — SIGNIFICANT CHANGE UP (ref 1.6–2.6)
MAGNESIUM SERPL-MCNC: 1.9 MG/DL — SIGNIFICANT CHANGE UP (ref 1.6–2.6)
MAGNESIUM SERPL-MCNC: 2.3 MG/DL — SIGNIFICANT CHANGE UP (ref 1.6–2.6)
MAGNESIUM SERPL-MCNC: 2.3 MG/DL — SIGNIFICANT CHANGE UP (ref 1.6–2.6)
MCHC RBC-ENTMCNC: 25.7 PG — LOW (ref 27–34)
MCHC RBC-ENTMCNC: 25.7 PG — LOW (ref 27–34)
MCHC RBC-ENTMCNC: 31.7 GM/DL — LOW (ref 32–36)
MCHC RBC-ENTMCNC: 31.7 GM/DL — LOW (ref 32–36)
MCV RBC AUTO: 81.2 FL — SIGNIFICANT CHANGE UP (ref 80–100)
MCV RBC AUTO: 81.2 FL — SIGNIFICANT CHANGE UP (ref 80–100)
NRBC # BLD: 0 /100 WBCS — SIGNIFICANT CHANGE UP (ref 0–0)
NRBC # BLD: 0 /100 WBCS — SIGNIFICANT CHANGE UP (ref 0–0)
PHOSPHATE SERPL-MCNC: 3.5 MG/DL — SIGNIFICANT CHANGE UP (ref 2.5–4.5)
PHOSPHATE SERPL-MCNC: 3.5 MG/DL — SIGNIFICANT CHANGE UP (ref 2.5–4.5)
PHOSPHATE SERPL-MCNC: 3.7 MG/DL — SIGNIFICANT CHANGE UP (ref 2.5–4.5)
PHOSPHATE SERPL-MCNC: 3.7 MG/DL — SIGNIFICANT CHANGE UP (ref 2.5–4.5)
PLATELET # BLD AUTO: 210 K/UL — SIGNIFICANT CHANGE UP (ref 150–400)
PLATELET # BLD AUTO: 210 K/UL — SIGNIFICANT CHANGE UP (ref 150–400)
POTASSIUM SERPL-MCNC: 4.1 MMOL/L — SIGNIFICANT CHANGE UP (ref 3.5–5.3)
POTASSIUM SERPL-MCNC: 4.1 MMOL/L — SIGNIFICANT CHANGE UP (ref 3.5–5.3)
POTASSIUM SERPL-MCNC: 4.2 MMOL/L — SIGNIFICANT CHANGE UP (ref 3.5–5.3)
POTASSIUM SERPL-MCNC: 4.2 MMOL/L — SIGNIFICANT CHANGE UP (ref 3.5–5.3)
POTASSIUM SERPL-SCNC: 4.1 MMOL/L — SIGNIFICANT CHANGE UP (ref 3.5–5.3)
POTASSIUM SERPL-SCNC: 4.1 MMOL/L — SIGNIFICANT CHANGE UP (ref 3.5–5.3)
POTASSIUM SERPL-SCNC: 4.2 MMOL/L — SIGNIFICANT CHANGE UP (ref 3.5–5.3)
POTASSIUM SERPL-SCNC: 4.2 MMOL/L — SIGNIFICANT CHANGE UP (ref 3.5–5.3)
PROT SERPL-MCNC: 5.6 G/DL — LOW (ref 6–8.3)
PROT SERPL-MCNC: 5.6 G/DL — LOW (ref 6–8.3)
PROT SERPL-MCNC: 5.9 G/DL — LOW (ref 6–8.3)
PROT SERPL-MCNC: 5.9 G/DL — LOW (ref 6–8.3)
PROTHROM AB SERPL-ACNC: 12.9 SEC — SIGNIFICANT CHANGE UP (ref 9.5–13)
PROTHROM AB SERPL-ACNC: 12.9 SEC — SIGNIFICANT CHANGE UP (ref 9.5–13)
RBC # BLD: 4.47 M/UL — SIGNIFICANT CHANGE UP (ref 4.2–5.8)
RBC # BLD: 4.47 M/UL — SIGNIFICANT CHANGE UP (ref 4.2–5.8)
RBC # FLD: 15.4 % — HIGH (ref 10.3–14.5)
RBC # FLD: 15.4 % — HIGH (ref 10.3–14.5)
SODIUM SERPL-SCNC: 136 MMOL/L — SIGNIFICANT CHANGE UP (ref 135–145)
SODIUM SERPL-SCNC: 136 MMOL/L — SIGNIFICANT CHANGE UP (ref 135–145)
SODIUM SERPL-SCNC: 139 MMOL/L — SIGNIFICANT CHANGE UP (ref 135–145)
SODIUM SERPL-SCNC: 139 MMOL/L — SIGNIFICANT CHANGE UP (ref 135–145)
T PALLIDUM AB TITR SER: NEGATIVE — SIGNIFICANT CHANGE UP
T PALLIDUM AB TITR SER: NEGATIVE — SIGNIFICANT CHANGE UP
TSH SERPL-MCNC: 0.39 UIU/ML — SIGNIFICANT CHANGE UP (ref 0.27–4.2)
TSH SERPL-MCNC: 0.39 UIU/ML — SIGNIFICANT CHANGE UP (ref 0.27–4.2)
VIT B12 SERPL-MCNC: 343 PG/ML — SIGNIFICANT CHANGE UP (ref 232–1245)
VIT B12 SERPL-MCNC: 343 PG/ML — SIGNIFICANT CHANGE UP (ref 232–1245)
WBC # BLD: 8.4 K/UL — SIGNIFICANT CHANGE UP (ref 3.8–10.5)
WBC # BLD: 8.4 K/UL — SIGNIFICANT CHANGE UP (ref 3.8–10.5)
WBC # FLD AUTO: 8.4 K/UL — SIGNIFICANT CHANGE UP (ref 3.8–10.5)
WBC # FLD AUTO: 8.4 K/UL — SIGNIFICANT CHANGE UP (ref 3.8–10.5)

## 2023-10-29 PROCEDURE — 99292 CRITICAL CARE ADDL 30 MIN: CPT

## 2023-10-29 PROCEDURE — 93010 ELECTROCARDIOGRAM REPORT: CPT

## 2023-10-29 PROCEDURE — 93281 PM DEVICE PROGR EVAL MULTI: CPT | Mod: 26

## 2023-10-29 PROCEDURE — 99233 SBSQ HOSP IP/OBS HIGH 50: CPT | Mod: 25

## 2023-10-29 PROCEDURE — 99291 CRITICAL CARE FIRST HOUR: CPT | Mod: 25

## 2023-10-29 PROCEDURE — 99291 CRITICAL CARE FIRST HOUR: CPT | Mod: GC

## 2023-10-29 RX ORDER — MAGNESIUM SULFATE 500 MG/ML
2 VIAL (ML) INJECTION ONCE
Refills: 0 | Status: COMPLETED | OUTPATIENT
Start: 2023-10-29 | End: 2023-10-29

## 2023-10-29 RX ORDER — INSULIN LISPRO 100/ML
10 VIAL (ML) SUBCUTANEOUS
Refills: 0 | Status: DISCONTINUED | OUTPATIENT
Start: 2023-10-29 | End: 2023-11-02

## 2023-10-29 RX ORDER — HEPARIN SODIUM 5000 [USP'U]/ML
1200 INJECTION INTRAVENOUS; SUBCUTANEOUS
Qty: 25000 | Refills: 0 | Status: DISCONTINUED | OUTPATIENT
Start: 2023-10-29 | End: 2023-11-03

## 2023-10-29 RX ORDER — INSULIN GLARGINE 100 [IU]/ML
26 INJECTION, SOLUTION SUBCUTANEOUS AT BEDTIME
Refills: 0 | Status: DISCONTINUED | OUTPATIENT
Start: 2023-10-29 | End: 2023-11-02

## 2023-10-29 RX ORDER — QUETIAPINE FUMARATE 200 MG/1
25 TABLET, FILM COATED ORAL ONCE
Refills: 0 | Status: COMPLETED | OUTPATIENT
Start: 2023-10-29 | End: 2023-10-29

## 2023-10-29 RX ADMIN — Medication 3: at 17:13

## 2023-10-29 RX ADMIN — Medication 1: at 12:23

## 2023-10-29 RX ADMIN — PANTOPRAZOLE SODIUM 40 MILLIGRAM(S): 20 TABLET, DELAYED RELEASE ORAL at 08:14

## 2023-10-29 RX ADMIN — SENNA PLUS 2 TABLET(S): 8.6 TABLET ORAL at 21:31

## 2023-10-29 RX ADMIN — Medication 125 MICROGRAM(S): at 05:28

## 2023-10-29 RX ADMIN — Medication 8 UNIT(S): at 12:24

## 2023-10-29 RX ADMIN — HEPARIN SODIUM 12 UNIT(S)/HR: 5000 INJECTION INTRAVENOUS; SUBCUTANEOUS at 05:42

## 2023-10-29 RX ADMIN — BUMETANIDE 2 MILLIGRAM(S): 0.25 INJECTION INTRAMUSCULAR; INTRAVENOUS at 05:28

## 2023-10-29 RX ADMIN — Medication 8 UNIT(S): at 08:13

## 2023-10-29 RX ADMIN — MILRINONE LACTATE 2.63 MICROGRAM(S)/KG/MIN: 1 INJECTION, SOLUTION INTRAVENOUS at 05:42

## 2023-10-29 RX ADMIN — HEPARIN SODIUM 12 UNIT(S)/HR: 5000 INJECTION INTRAVENOUS; SUBCUTANEOUS at 21:31

## 2023-10-29 RX ADMIN — TAMSULOSIN HYDROCHLORIDE 0.4 MILLIGRAM(S): 0.4 CAPSULE ORAL at 21:31

## 2023-10-29 RX ADMIN — CHLORHEXIDINE GLUCONATE 1 APPLICATION(S): 213 SOLUTION TOPICAL at 21:32

## 2023-10-29 RX ADMIN — Medication 8 UNIT(S): at 17:14

## 2023-10-29 RX ADMIN — Medication 81 MILLIGRAM(S): at 12:23

## 2023-10-29 RX ADMIN — Medication 1: at 08:13

## 2023-10-29 RX ADMIN — Medication 25 GRAM(S): at 06:39

## 2023-10-29 RX ADMIN — MILRINONE LACTATE 2.63 MICROGRAM(S)/KG/MIN: 1 INJECTION, SOLUTION INTRAVENOUS at 06:42

## 2023-10-29 RX ADMIN — INSULIN GLARGINE 26 UNIT(S): 100 INJECTION, SOLUTION SUBCUTANEOUS at 22:08

## 2023-10-29 RX ADMIN — HEPARIN SODIUM 12 UNIT(S)/HR: 5000 INJECTION INTRAVENOUS; SUBCUTANEOUS at 06:41

## 2023-10-29 RX ADMIN — MILRINONE LACTATE 2.63 MICROGRAM(S)/KG/MIN: 1 INJECTION, SOLUTION INTRAVENOUS at 21:31

## 2023-10-29 RX ADMIN — QUETIAPINE FUMARATE 25 MILLIGRAM(S): 200 TABLET, FILM COATED ORAL at 21:32

## 2023-10-29 RX ADMIN — ATORVASTATIN CALCIUM 80 MILLIGRAM(S): 80 TABLET, FILM COATED ORAL at 21:31

## 2023-10-29 NOTE — PROGRESS NOTE ADULT - PROBLEM SELECTOR PLAN 1
#HFrEF  - Etiology: ICM  - Last TTE: EF <20% rMWA  - Cath: : 95% discrete proximal LAD disease and sequential multifocal disease with good distal target, 80-90% proximal LCx disease just prior to marginals, severe multifocal RCA disease with good targets   - NYHA: III  - ACC: C  - SCAI: B  - GDMT:    > BB: Holding iso compensatory tachycardia vs AT/AF, stop digoxin and check level     > ACE/ARB/ARNI: Stopped Entresto 24-26 BID due to low flow and elevated Cr     > MRA: Stopped spironolactone 25 QDay due to low flow and elevated Cr     > SGLT2 I: Stopped Farxiga 10 QDay   - Inotropes: milrinone 0.25 mcg  - Diuretics: Bumex 2 QDay   > F/U swan numbers to determine need for diuretics  - F/U device interrogation to r/o AT/AF as pt would potentially benefit from synchrony    > F/U proBNP [id number]  - Volume Status: Mildly hypervolemic   - Advanced therapies: severe LV dysfunction with tachycardia and poor non-invasive hemodynamics concerning, currently holding off on launching VAD/transplant eval given cognitive issues however will continue to reassess. Of note he has good support and no clear psychosocial red flags. ABO AB. would need neurologic assessment with prior CVA and ideally improved conditioning given ongoing prolonged hospitalization.  - F/U PT recs #HFrEF  - Etiology: ICM  - Last TTE: EF <20% rMWA  - Cath: : 95% discrete proximal LAD disease and sequential multifocal disease with good distal target, 80-90% proximal LCx disease just prior to marginals, severe multifocal RCA disease with good targets   - NYHA: III  - ACC: C  - SCAI: C  - GDMT:    > BB: Holding iso compensatory tachycardia vs AT/AF, stop digoxin and check level     > ACE/ARB/ARNI: Stopped Entresto 24-26 BID due to low flow and elevated Cr     > MRA: Stopped spironolactone 25 QDay due to low flow and elevated Cr     > SGLT2 I: Stopped Farxiga 10 QDay   - Inotropes: milrinone 0.25 mcg  - Diuretics: Bumex 2 QDay   > F/U swan numbers to determine need for diuretics  - F/U device interrogation to r/o AT/AF as pt would potentially benefit from synchrony    > F/U proBNP 50114  - Volume Status: Mildly hypervolemic   - Advanced therapies: severe LV dysfunction with tachycardia and poor non-invasive hemodynamics concerning, currently holding off on launching VAD/transplant eval given cognitive issues however will continue to reassess. Of note he has good support and no clear psychosocial red flags. ABO AB. would need neurologic assessment with prior CVA and ideally improved conditioning given ongoing prolonged hospitalization.  - F/U PT recs

## 2023-10-29 NOTE — CONSULT NOTE ADULT - ATTENDING COMMENTS
57 YO M with a history of CVA with residual mild R paresthesias, second degree Mobitz II heart block s/p DC-ICD 12/2022 (initial concern for sarcoid but negative biopsy/PET post procedure), DM2 (A1c 8.4%), and HTN who was admitted to [facility] with chest pain and dyspnea, found to have NSTEMI with markedly elevated troponins and new severe LV dysfunction with regional wall motion abnormalities, admitted to CICU for low output state on milrinone, now with a. tach.  Plan for EMILEE/DCCV when ready
57 y/o man with h/o CVA, HTN, DM, HLD, conduction disease/Mobitz II s/p dual chamber ICD, HFrEF (EF 20%) with segmental wall motion abnormalities transferred from OSH with ADHF/PNA now awaiting work-up for ischemic eval in setting of reduced LVEF with WMA.  --Markedly elevated trop at OSH with regional WMA on TTE--likely underlying CAD.  --Patient would like to discuss with his wife when she comes.  --For now, optimize GDMT as BP/Cr tolerate.  Cr appears to be trending in the right direction.  --Keep O>I, strict Is and Os and daily weights.  --Monitor closely on telemetry.

## 2023-10-29 NOTE — PROGRESS NOTE ADULT - PROBLEM SELECTOR PLAN 2
- On ASA/statin, cMRI with no viability in LAD territory  - Deemed too high risk for CABG  - Possible PCI if an AT candidate and family wants, however now w/ an LV thrombus and limited support options

## 2023-10-29 NOTE — PROGRESS NOTE ADULT - SUBJECTIVE AND OBJECTIVE BOX
Patient is a 58y old  Male who presents with a chief complaint of NSTEMI (28 Oct 2023 19:24)    HPI:  Patient with separate chart from [facility], MRN# [id number]    57 yo Male pmhx CVA with mild left sided deficits, HTN, DM2, vertigo, HLD, Mobitz II s/pp Medtronic dual chamber ICD (22) initially presented to [facility] from home with complaints of dyspnea and chest pain and was admitted w/ acute hypoxic respiratory failure likely due to acute pulmonary edema due to acute HFrEF in setting of acute NSTEMI, LAURA and enterovirus infection. Pt with brief MICU stay at [facility] requiring bipap (no intubation), was treated for PNA with cefepime, and was found to have TTE done 23 showing EF 20% with severely decreased LVSF, multiple regional wall motion abnormalities, and elevated LV end diastolic pressure. Pt was transferred to [facility] for cardiac cath evaluation. Patient without any acute complaints, complaining of intermittent palpitations for the last 2 days. No chest pain, SOB, fevers, nausea, vomiting, abdominal pain.  (13 Oct 2023 21:05)       INTERVAL HPI/OVERNIGHT EVENTS:   No overnight events   Afebrile, hemodynamically stable     Subjective:    ICU Vital Signs Last 24 Hrs  T(C): 36.8 (29 Oct 2023 05:00), Max: 37.1 (28 Oct 2023 19:00)  T(F): 98.3 (29 Oct 2023 05:00), Max: 98.8 (28 Oct 2023 19:00)  HR: 132 (29 Oct 2023 07:00) (122 - 143)  BP: 70/50 (29 Oct 2023 07:00) (70/50 - 121/70)  BP(mean): 56 (29 Oct 2023 07:00) (56 - 90)  ABP: --  ABP(mean): --  RR: 24 (29 Oct 2023 07:00) (18 - 45)  SpO2: 91% (29 Oct 2023 07:00) (91% - 97%)    O2 Parameters below as of 29 Oct 2023 07:00  Patient On (Oxygen Delivery Method): room air          I&O's Summary    28 Oct 2023 07:01  -  29 Oct 2023 07:00  --------------------------------------------------------  IN: 497.6 mL / OUT: 550 mL / NET: -52.4 mL          Daily     Daily Weight in k.5 (28 Oct 2023 12:15)    Adult Advanced Hemodynamics Last 24 Hrs  CVP(mm Hg): --  CVP(cm H2O): --  CO: --  CI: --  PA: --  PA(mean): --  PCWP: --  SVR: --  SVRI: --  PVR: --  PVRI: --    EKG/Telemetry Events:    MEDICATIONS  (STANDING):  aspirin enteric coated 81 milliGRAM(s) Oral daily  atorvastatin 80 milliGRAM(s) Oral at bedtime  buMETAnide 2 milliGRAM(s) Oral daily  chlorhexidine 2% Cloths 1 Application(s) Topical daily  dextrose 5%. 1000 milliLiter(s) (50 mL/Hr) IV Continuous <Continuous>  dextrose 5%. 1000 milliLiter(s) (100 mL/Hr) IV Continuous <Continuous>  digoxin     Tablet 125 MICROGram(s) Oral daily  heparin  Infusion 1200 Unit(s)/Hr (12 mL/Hr) IV Continuous <Continuous>  insulin glargine Injectable (LANTUS) 24 Unit(s) SubCutaneous at bedtime  insulin lispro (ADMELOG) corrective regimen sliding scale   SubCutaneous three times a day before meals  insulin lispro (ADMELOG) corrective regimen sliding scale   SubCutaneous at bedtime  insulin lispro Injectable (ADMELOG) 8 Unit(s) SubCutaneous three times a day before meals  milrinone Infusion 0.125 MICROgram(s)/kG/Min (2.63 mL/Hr) IV Continuous <Continuous>  pantoprazole    Tablet 40 milliGRAM(s) Oral before breakfast  senna 2 Tablet(s) Oral at bedtime  tamsulosin 0.4 milliGRAM(s) Oral at bedtime    MEDICATIONS  (PRN):  benzocaine/menthol Lozenge 1 Lozenge Oral every 2 hours PRN Sore Throat  polyethylene glycol 3350 17 Gram(s) Oral daily PRN Constipation      PHYSICAL EXAM:  GENERAL:   HEAD:  Atraumatic, Normocephalic  EYES: EOMI, PERRLA, conjunctiva and sclera clear  NECK: Supple, No JVD, Normal thyroid, no enlarged nodes  NERVOUS SYSTEM:  Alert & Awake.   CHEST/LUNG: B/L good air entry; No rales, rhonchi, or wheezing  HEART: S1S2 normal, no S3, Regular rate and rhythm; No murmurs  ABDOMEN: Soft, Nontender, Nondistended; Bowel sounds present  EXTREMITIES:  2+ Peripheral Pulses, No clubbing, cyanosis, or edema  LYMPH: No lymphadenopathy noted  SKIN: No rashes or lesions    LABS:                        11.5   8.40  )-----------( 210      ( 29 Oct 2023 00:55 )             36.3     10-    139  |  104  |  42<H>  ----------------------------<  216<H>  4.1   |  22  |  1.79<H>    Ca    8.8      29 Oct 2023 05:42  Phos  3.7     10-  Mg     1.9     10-29    TPro  5.9<L>  /  Alb  2.9<L>  /  TBili  0.5  /  DBili  x   /  AST  13  /  ALT  25  /  AlkPhos  59  10-29    LIVER FUNCTIONS - ( 29 Oct 2023 05:42 )  Alb: 2.9 g/dL / Pro: 5.9 g/dL / ALK PHOS: 59 U/L / ALT: 25 U/L / AST: 13 U/L / GGT: x           PT/INR - ( 29 Oct 2023 00:03 )   PT: 12.9 sec;   INR: 1.24 ratio         PTT - ( 29 Oct 2023 00:03 )  PTT:56.0 sec  CAPILLARY BLOOD GLUCOSE      POCT Blood Glucose.: 200 mg/dL (28 Oct 2023 21:15)  POCT Blood Glucose.: 324 mg/dL (28 Oct 2023 17:00)  POCT Blood Glucose.: 162 mg/dL (28 Oct 2023 11:53)            Urinalysis Basic - ( 29 Oct 2023 05:42 )    Color: x / Appearance: x / SG: x / pH: x  Gluc: 216 mg/dL / Ketone: x  / Bili: x / Urobili: x   Blood: x / Protein: x / Nitrite: x   Leuk Esterase: x / RBC: x / WBC x   Sq Epi: x / Non Sq Epi: x / Bacteria: x          RADIOLOGY & ADDITIONAL TESTS:  CXR:        Care Discussed with Consultants/Other Providers [ x] YES  [ ] NO           Patient is a 58y old  Male who presents with a chief complaint of NSTEMI (28 Oct 2023 19:24)    HPI:  Patient with separate chart from [facility], MRN# [id number]    59 yo Male pmhx CVA with mild left sided deficits, HTN, DM2, vertigo, HLD, Mobitz II s/pp Medtronic dual chamber ICD (22) initially presented to [facility] from home with complaints of dyspnea and chest pain and was admitted w/ acute hypoxic respiratory failure likely due to acute pulmonary edema due to acute HFrEF in setting of acute NSTEMI, LAURA and enterovirus infection. Pt with brief MICU stay at [facility] requiring bipap (no intubation), was treated for PNA with cefepime, and was found to have TTE done 23 showing EF 20% with severely decreased LVSF, multiple regional wall motion abnormalities, and elevated LV end diastolic pressure. Pt was transferred to [facility] for cardiac cath evaluation. Patient without any acute complaints, complaining of intermittent palpitations for the last 2 days. No chest pain, SOB, fevers, nausea, vomiting, abdominal pain.  (13 Oct 2023 21:05)       INTERVAL HPI/OVERNIGHT EVENTS:   No overnight events   Afebrile, hemodynamically stable, persistently tachycardic to 120-130s    Subjective: Feeling well, no acute complaints, denies cp/sob/dizziness/palpitations.    ICU Vital Signs Last 24 Hrs  T(C): 36.8 (29 Oct 2023 05:00), Max: 37.1 (28 Oct 2023 19:00)  T(F): 98.3 (29 Oct 2023 05:00), Max: 98.8 (28 Oct 2023 19:00)  HR: 132 (29 Oct 2023 07:00) (122 - 143)  BP: 70/50 (29 Oct 2023 07:00) (70/50 - 121/70)  BP(mean): 56 (29 Oct 2023 07:00) (56 - 90)  ABP: --  ABP(mean): --  RR: 24 (29 Oct 2023 07:00) (18 - 45)  SpO2: 91% (29 Oct 2023 07:00) (91% - 97%)    O2 Parameters below as of 29 Oct 2023 07:00  Patient On (Oxygen Delivery Method): room air          I&O's Summary    28 Oct 2023 07:01  -  29 Oct 2023 07:00  --------------------------------------------------------  IN: 497.6 mL / OUT: 550 mL / NET: -52.4 mL          Daily     Daily Weight in k.5 (28 Oct 2023 12:15)    Adult Advanced Hemodynamics Last 24 Hrs  CVP(mm Hg): --  CVP(cm H2O): --  CO: --  CI: --  PA: --  PA(mean): --  PCWP: --  SVR: --  SVRI: --  PVR: --  PVRI: --    EKG/Telemetry Events:    MEDICATIONS  (STANDING):  aspirin enteric coated 81 milliGRAM(s) Oral daily  atorvastatin 80 milliGRAM(s) Oral at bedtime  buMETAnide 2 milliGRAM(s) Oral daily  chlorhexidine 2% Cloths 1 Application(s) Topical daily  dextrose 5%. 1000 milliLiter(s) (50 mL/Hr) IV Continuous <Continuous>  dextrose 5%. 1000 milliLiter(s) (100 mL/Hr) IV Continuous <Continuous>  digoxin     Tablet 125 MICROGram(s) Oral daily  heparin  Infusion 1200 Unit(s)/Hr (12 mL/Hr) IV Continuous <Continuous>  insulin glargine Injectable (LANTUS) 24 Unit(s) SubCutaneous at bedtime  insulin lispro (ADMELOG) corrective regimen sliding scale   SubCutaneous three times a day before meals  insulin lispro (ADMELOG) corrective regimen sliding scale   SubCutaneous at bedtime  insulin lispro Injectable (ADMELOG) 8 Unit(s) SubCutaneous three times a day before meals  milrinone Infusion 0.125 MICROgram(s)/kG/Min (2.63 mL/Hr) IV Continuous <Continuous>  pantoprazole    Tablet 40 milliGRAM(s) Oral before breakfast  senna 2 Tablet(s) Oral at bedtime  tamsulosin 0.4 milliGRAM(s) Oral at bedtime    MEDICATIONS  (PRN):  benzocaine/menthol Lozenge 1 Lozenge Oral every 2 hours PRN Sore Throat  polyethylene glycol 3350 17 Gram(s) Oral daily PRN Constipation      PHYSICAL EXAM:  GENERAL: AAOx4 NAD  HEAD:  Atraumatic, Normocephalic  EYES: EOMI, PERRLA, conjunctiva and sclera clear  NECK: Supple, No JVD, Normal thyroid, no enlarged nodes  NERVOUS SYSTEM: Strength 5/5 throughout intact finger to nose  CHEST/LUNG: B/L good air entry; No rales, rhonchi, or wheezing  HEART: S1S2 normal, no S3, Regular rate and rhythm; No murmurs  ABDOMEN: Soft, Nontender, Nondistended; Bowel sounds present  EXTREMITIES:  2+ Peripheral Pulses, No clubbing, cyanosis, or edema  LYMPH: No lymphadenopathy noted  SKIN: No rashes or lesions    LABS:                        11.5   8.40  )-----------( 210      ( 29 Oct 2023 00:55 )             36.3     10-29    139  |  104  |  42<H>  ----------------------------<  216<H>  4.1   |  22  |  1.79<H>    Ca    8.8      29 Oct 2023 05:42  Phos  3.7     10-29  Mg     1.9     10-29    TPro  5.9<L>  /  Alb  2.9<L>  /  TBili  0.5  /  DBili  x   /  AST  13  /  ALT  25  /  AlkPhos  59  10-29    LIVER FUNCTIONS - ( 29 Oct 2023 05:42 )  Alb: 2.9 g/dL / Pro: 5.9 g/dL / ALK PHOS: 59 U/L / ALT: 25 U/L / AST: 13 U/L / GGT: x           PT/INR - ( 29 Oct 2023 00:03 )   PT: 12.9 sec;   INR: 1.24 ratio         PTT - ( 29 Oct 2023 00:03 )  PTT:56.0 sec  CAPILLARY BLOOD GLUCOSE      POCT Blood Glucose.: 200 mg/dL (28 Oct 2023 21:15)  POCT Blood Glucose.: 324 mg/dL (28 Oct 2023 17:00)  POCT Blood Glucose.: 162 mg/dL (28 Oct 2023 11:53)            Urinalysis Basic - ( 29 Oct 2023 05:42 )    Color: x / Appearance: x / SG: x / pH: x  Gluc: 216 mg/dL / Ketone: x  / Bili: x / Urobili: x   Blood: x / Protein: x / Nitrite: x   Leuk Esterase: x / RBC: x / WBC x   Sq Epi: x / Non Sq Epi: x / Bacteria: x          RADIOLOGY & ADDITIONAL TESTS:  CXR:        Care Discussed with Consultants/Other Providers [ x] YES  [ ] NO

## 2023-10-29 NOTE — CONSULT NOTE ADULT - ASSESSMENT
59 YO M with a history of CVA with residual mild R paresthesias, second degree Mobitz II heart block s/p DC-ICD 12/2022 (initial concern for sarcoid but negative biopsy/PET post procedure), DM2 (A1c 8.4%), and HTN who was admitted to [facility] with chest pain and dyspnea, found to have NSTEMI with markedly elevated troponins and new severe LV dysfunction with regional wall motion abnormalities, admitted to CICU for low output state on milrinone, now with a. tach.    #Atrial tachycardia  - Plan for EMILEE/cardioversion 10/30  - NPO midnight  - teri agents limited by heart failure and current use of inotropes  - Remainder of cardiac care per CICU and HF teams    Case discussed with EP attending Dr. Renteria  Please see attending attestation for final recommendations        Zelalem Norwood MD  Cardiology Fellow     All Cardiology service information can be found 24/7 on [email or web address], password: [email or web address]

## 2023-10-29 NOTE — PROGRESS NOTE ADULT - SUBJECTIVE AND OBJECTIVE BOX
Patient is a 58y old  Male who presents with a chief complaint of NSTEMI (29 Oct 2023 12:32)      INTERVAL HISTORY:  -    SUBJECTIVE  - Patient seen and evaluated at bedside.     MEDICATIONS:  MEDICATIONS  (STANDING):  aspirin enteric coated 81 milliGRAM(s) Oral daily  atorvastatin 80 milliGRAM(s) Oral at bedtime  buMETAnide 2 milliGRAM(s) Oral daily  chlorhexidine 2% Cloths 1 Application(s) Topical daily  dextrose 5%. 1000 milliLiter(s) (50 mL/Hr) IV Continuous <Continuous>  dextrose 5%. 1000 milliLiter(s) (100 mL/Hr) IV Continuous <Continuous>  heparin  Infusion 1200 Unit(s)/Hr (12 mL/Hr) IV Continuous <Continuous>  insulin glargine Injectable (LANTUS) 26 Unit(s) SubCutaneous at bedtime  insulin lispro (ADMELOG) corrective regimen sliding scale   SubCutaneous at bedtime  insulin lispro (ADMELOG) corrective regimen sliding scale   SubCutaneous three times a day before meals  insulin lispro Injectable (ADMELOG) 10 Unit(s) SubCutaneous three times a day before meals  milrinone Infusion 0.125 MICROgram(s)/kG/Min (2.63 mL/Hr) IV Continuous <Continuous>  pantoprazole    Tablet 40 milliGRAM(s) Oral before breakfast  senna 2 Tablet(s) Oral at bedtime  tamsulosin 0.4 milliGRAM(s) Oral at bedtime    MEDICATIONS  (PRN):  benzocaine/menthol Lozenge 1 Lozenge Oral every 2 hours PRN Sore Throat  polyethylene glycol 3350 17 Gram(s) Oral daily PRN Constipation      OBJECTIVE:  ICU Vital Signs Last 24 Hrs  T(C): 36.9 (29 Oct 2023 14:10), Max: 37.1 (29 Oct 2023 08:00)  T(F): 98.4 (29 Oct 2023 14:10), Max: 98.7 (29 Oct 2023 08:00)  HR: 130 (29 Oct 2023 18:00) (126 - 143)  BP: 100/59 (29 Oct 2023 13:00) (70/50 - 121/70)  BP(mean): 72 (29 Oct 2023 13:00) (56 - 90)  ABP: 81/41 (29 Oct 2023 18:00) (81/41 - 110/48)  ABP(mean): 52 (29 Oct 2023 18:00) (51 - 73)  RR: 17 (29 Oct 2023 18:00) (11 - 45)  SpO2: 97% (29 Oct 2023 18:00) (91% - 97%)    O2 Parameters below as of 29 Oct 2023 18:00  Patient On (Oxygen Delivery Method): room air            Adult Advanced Hemodynamics Last 24 Hrs  CVP(mm Hg): --  CVP(cm H2O): --  CO: --  CI: --  PA: --  PA(mean): --  PCWP: --  SVR: --  SVRI: --  PVR: --  PVRI: --  CAPILLARY BLOOD GLUCOSE      POCT Blood Glucose.: 284 mg/dL (29 Oct 2023 17:03)  POCT Blood Glucose.: 200 mg/dL (29 Oct 2023 12:17)  POCT Blood Glucose.: 157 mg/dL (29 Oct 2023 08:06)  POCT Blood Glucose.: 200 mg/dL (28 Oct 2023 21:15)    CAPILLARY BLOOD GLUCOSE      POCT Blood Glucose.: 284 mg/dL (29 Oct 2023 17:03)    I&O's Summary    28 Oct 2023 07:01  -  29 Oct 2023 07:00  --------------------------------------------------------  IN: 514.9 mL / OUT: 550 mL / NET: -35.1 mL    29 Oct 2023 07:01  -  29 Oct 2023 19:53  --------------------------------------------------------  IN: 837.6 mL / OUT: 620 mL / NET: 217.6 mL      Daily     Daily     PHYSICAL EXAM:  General: NAD, well-groomed, well-developed  Eyes: Conjunctiva and sclera clear  ENMT: Moist mucous membranes  Neck: Supple  Chest: Clear to auscultation bilaterally; no rales, rhonchi, or wheezing  Heart: Regular rate and rhythm; normal S1 and S2; no murmurs, rubs, or gallops  Abd: Soft, nontender, nondistended  Nervous System: AAOX3  Ext: no peripheral LE edema bilaterally  Lines/Tubes: IV peripheral    LABS:                          11.5   8.40  )-----------( 210      ( 29 Oct 2023 00:55 )             36.3     10-29    136  |  104  |  43<H>  ----------------------------<  223<H>  4.2   |  21<L>  |  1.88<H>    Ca    8.4      29 Oct 2023 14:23  Phos  3.5     10-29  Mg     2.3     10-29    TPro  5.6<L>  /  Alb  2.7<L>  /  TBili  0.3  /  DBili  x   /  AST  16  /  ALT  24  /  AlkPhos  56  10-29    LIVER FUNCTIONS - ( 29 Oct 2023 14:23 )  Alb: 2.7 g/dL / Pro: 5.6 g/dL / ALK PHOS: 56 U/L / ALT: 24 U/L / AST: 16 U/L / GGT: x           PT/INR - ( 29 Oct 2023 00:03 )   PT: 12.9 sec;   INR: 1.24 ratio         PTT - ( 29 Oct 2023 00:03 )  PTT:56.0 sec        Urinalysis Basic - ( 29 Oct 2023 14:23 )    Color: x / Appearance: x / SG: x / pH: x  Gluc: 223 mg/dL / Ketone: x  / Bili: x / Urobili: x   Blood: x / Protein: x / Nitrite: x   Leuk Esterase: x / RBC: x / WBC x   Sq Epi: x / Non Sq Epi: x / Bacteria: x          Plan:  ====================== NEUROLOGY=====================  - continue to monitor mental status as per protocol     ==================== RESPIRATORY======================  - continue to monitor SpO2 with goal >94%    Mechanical Vent:     ====================CARDIOVASCULAR==================      ===================== RENAL =========================  - Continue monitoring urine output, lytes, SCr/ BUN  - replete lytes prn with goal K >4 and Mg >2    =============== GASTROINTESTINAL===================      ===================ENDO====================      ===================HEMATOLOGIC/ONC ===================  - Monitor H/H and plts  - DVT PPX:     ==================INFECTIOUS DISEASE================  - monitor and trend WBC and temperature curve     Dispo:    Patient requires continuous monitoring with bedside rhythm monitoring, arterial line, pulse oximetry, ventilator monitoring and intermittent blood gas analysis.  Care plan discussed with ICU care team.  I have spent 35 minutes providing critical care, in addition to initial critical time provided by CICU attending _____ , re-evaluated multiple times during the day.    May Hanna PA-C

## 2023-10-29 NOTE — PROGRESS NOTE ADULT - PROBLEM SELECTOR PLAN 3
- Seen on 10/26 limited TTE  - Cont hep gtt   - No longer a candidate for impella assisted PCI, will discuss with interventional and surgical teams possibility of other forms of support

## 2023-10-29 NOTE — CONSULT NOTE ADULT - SUBJECTIVE AND OBJECTIVE BOX
NEW PATIENT    Patient: Benjamin Rivera  : 1977    Primary Care Provider: Provider, No Known    Requesting Provider: As above    Pain of the Left Ankle      History    Chief Complaint: left ankle pain    History of Present Illness: this is a very pleasant 43 year old gentleman who is a charge nurse in the cardiac ICU (at )  He had a severe inversion injury of the left ankle after playing basket ball in May of this year.  (stepped in hole in yard)  He had severe swelling, ecchymosis and pain.  He continued to work and walk, had lots of family issues to take care of and  Did not seek treatment beyond seeing [facility] and having an xray 2020.  I looked at the films and report- no acute fracture but 2 old avulsion ossicles laterally off fibula and one medial.  He slowly improved but over the past several weeks has had more medial pain.  He had to take a day off work due to the pain.  He does not remember any prior injury on this ankle (has had right ankle injuries).  The pain is worse with activity, sharp and aching, 6/10.      Current Outpatient Medications on File Prior to Visit   Medication Sig Dispense Refill   • hepatitis A (HAVRIX) 1440 EL U/ML vaccine Inject 1 mL into the appropriate muscle as directed by prescriber. 1 mL 0   • hepatitis A vaccine (VAQTA) 50 UNIT/ML injection Inject 1 mL into the appropriate muscle as directed by prescriber. Repeat in 6 months 1 mL 1     No current facility-administered medications on file prior to visit.       No Known Allergies   History reviewed. No pertinent past medical history.  Past Surgical History:   Procedure Laterality Date   • WISDOM TOOTH EXTRACTION       Family History   Problem Relation Age of Onset   • No Known Problems Mother    • No Known Problems Father       Social History     Socioeconomic History   • Marital status: Single     Spouse name: Not on file   • Number of children: Not on file   • Years of education: Not on file   • Highest education level: Not    Patient seen and evaluated at bedside    Chief Complaint:    HPI:  Patient with separate chart from [facility], MRN# [id number]    59 YO M with a history of CVA with residual mild R paresthesias, second degree Mobitz II heart block s/p DC-ICD 12/2022 (initial concern for sarcoid but negative biopsy/PET post procedure), DM2 (A1c 8.4%), and HTN who was admitted to [facility] with chest pain and dyspnea, found to have NSTEMI with markedly elevated troponins and new severe LV dysfunction with regional wall motion abnormalities as well as + enterovirus. He required NIPPV and was diuresed before being transferred to [facility] where LHC performed which revealed severe 3v CAD with critical proximal LAD involvement. CTS was consulted for CABG evaluation and HF consulted for comanagement.    He ultimately underwent RHC with revealed elevated wedge but normal cardiac output. Transferred back to CICU for AMS. Of note, patient has LV thrombus  .    Tele notable for persistent tachycardia, rates 130. C/f a. tach.     PMHx:   CVA (cerebrovascular accident)  T2DM (type 2 diabetes mellitus)  HTN (hypertension)  HLD (hyperlipidemia)        PSHx:   S/P cystoscopy  S/P hernia repair        Allergies:  No Known Allergies      Home Meds:  Current Medications:   aspirin enteric coated 81 milliGRAM(s) Oral daily  atorvastatin 80 milliGRAM(s) Oral at bedtime  benzocaine/menthol Lozenge 1 Lozenge Oral every 2 hours PRN  buMETAnide 2 milliGRAM(s) Oral daily  chlorhexidine 2% Cloths 1 Application(s) Topical daily  dextrose 5%. 1000 milliLiter(s) IV Continuous <Continuous>  dextrose 5%. 1000 milliLiter(s) IV Continuous <Continuous>  heparin  Infusion 1200 Unit(s)/Hr IV Continuous <Continuous>  insulin glargine Injectable (LANTUS) 24 Unit(s) SubCutaneous at bedtime  insulin lispro (ADMELOG) corrective regimen sliding scale   SubCutaneous three times a day before meals  insulin lispro (ADMELOG) corrective regimen sliding scale   SubCutaneous at bedtime  insulin lispro Injectable (ADMELOG) 8 Unit(s) SubCutaneous three times a day before meals  milrinone Infusion 0.125 MICROgram(s)/kG/Min IV Continuous <Continuous>  pantoprazole    Tablet 40 milliGRAM(s) Oral before breakfast  polyethylene glycol 3350 17 Gram(s) Oral daily PRN  senna 2 Tablet(s) Oral at bedtime  tamsulosin 0.4 milliGRAM(s) Oral at bedtime    REVIEW OF SYSTEMS:  All other review of systems is negative unless indicated above.    Physical Exam:  T(F): 98.7 (10-29), Max: 98.8 (10-28)  HR: 132 (10-29) (122 - 143)  BP: 81/60 (10-29) (70/50 - 121/70)  RR: 21 (10-29)  SpO2: 93% (10-29)  Appearance: No acute distress; well appearing  Eyes:  EOMI  HEENT: Normal oral mucosa  Cardiovascular: tachy  Respiratory: Clear to auscultation bilaterally  Gastrointestinal: soft, non-tender, non-distended  Extremities: no lower extremity edema   Neurologic: Non-focal  Psychiatry: AAOx3, mood & affect appropriate    Cardiovascular Diagnostic Testing:        Echo: Personally reviewed:  10/26    CONCLUSIONS:      1. Left ventricular systolic function is severely decreased with an ejection fraction visually estimated at <20 %.   2. Systolic function.   3. LV apical thrombus is seen.      Limited study.   4. Findings were discussed with Yenni LAFLEUR on 10/26/2023 at 1.29pm.   5. Small pericardial effusion noted adjacent to the right ventricle with no evidence of hemodynamic compromise.   6. There is a left ventricular thrombus.   7. There is normal LV mass and normal geometry.      Labs: Personally reviewed                        11.5   8.40  )-----------( 210      ( 29 Oct 2023 00:55 )             36.3     10-29    139  |  104  |  42<H>  ----------------------------<  216<H>  4.1   |  22  |  1.79<H>    Ca    8.8      29 Oct 2023 05:42  Phos  3.7     10-29  Mg     1.9     10-29    TPro  5.9<L>  /  Alb  2.9<L>  /  TBili  0.5  /  DBili  x   /  AST  13  /  ALT  25  /  AlkPhos  59  10-29    PT/INR - ( 29 Oct 2023 00:03 )   PT: 12.9 sec;   INR: 1.24 ratio         PTT - ( 29 Oct 2023 00:03 )  PTT:56.0 sec  Thyroid Stimulating Hormone, Serum: 0.39 uIU/mL (10-29 @ 00:03)     "on file   Tobacco Use   • Smoking status: Never Smoker   • Smokeless tobacco: Never Used   Substance and Sexual Activity   • Alcohol use: Not Currently   • Drug use: Never   • Sexual activity: Defer        Review of Systems   Constitutional: Positive for activity change.   HENT: Negative.    Eyes: Negative.    Respiratory: Negative.    Cardiovascular: Negative.    Gastrointestinal: Negative.    Endocrine: Negative.    Genitourinary: Negative.    Musculoskeletal: Positive for arthralgias.   Skin: Negative.    Allergic/Immunologic: Negative.    Neurological: Negative.    Hematological: Negative.    Psychiatric/Behavioral: Negative.        The following portions of the patient's history were reviewed and updated as appropriate: allergies, current medications, past family history, past medical history, past social history, past surgical history and problem list.    Physical Exam:   Pulse 91   Ht 172.7 cm (67.99\")   Wt 126 kg (277 lb)   SpO2 99%   BMI 42.13 kg/m²   GENERAL: Body habitus: morbidly obese    Lower extremity edema: Right: none; Left: none    Varicose veins:  Right: none; Left: none    Gait: antalgic with the first few steps     Mental Status:  awake and alert; oriented to person, place, and time    Voice:  clear  SKIN:  Lower extremity: Normal    Hair Growth(lower extremity):  Right:normal; Left:  normal  NAILS: Toenails: normal  HEENT: Head: Normocephalic, atraumatic,  without obvious abnormality.  eye: normal external eye, no icterus  ears:normal external ears  PULM:  Repiratory effort normal  CV:  Dorsalis Pedis:  Right: 2+; Left:2+    Posterior Tibial: Right:2+; Left:2+    Capillary Refill:  Brisk  MSK:  Hand:sensation intact      Tibia:  Right:  non tender; Left:  non tender      Ankle:  Right: non tender, ROM  normal, symmetric and stable to anterior drawer and motor function  normal; Left:  tender over the medial deltoid, not tender over post tib tendon, mildly tender over ATFL, not tender over " peroneals, not tender over achilles, slight thickening of ankle, stable to anterior drawer, symmetric ROM      Foot:  Right:  non tender, ROM  normal and symmetric, motor function  normal and moderate metatarsus adductus; Left:  non tender, ROM  normal and symmetric, motor function  normal and moderate metatarsus adductus      NEURO: Heel Walking:  Right:  normal; Left:  normal    Toe Walking:  Right:  normal; Left:  painful     Elgin-Levi 5.07 monofilament test: normal    Lower extremity sensation: intact     Reflexes:  Biceps:  Right:  not tested; Left:  not tested           Quads:  Right:  not tested; Left:  not tested           Ankle:  Right:  not tested; Left:  not tested      Calf Atrophy:none    Motor Function: all 5/5         Medical Decision Making    Data Review:   ordered and reviewed x-rays today, reviewed radiology images and reviewed radiology results    Assessment and Plan/ Diagnosis/Treatment options:   1. Sprain of deltoid ligament of left ankle, sequela  He is significantly tender over the medial deltoid.  He is noting increasing pain over time.  I would recommend that we proceed with MRI.  This will help determine whether he needs immobilization.  I went over his x-rays with him and showed him the old evulsion ossicles.  I do not think they are acute.  They do indicate prior trauma and it can indicate some subtle ankle instability also.  I think his metatarsus adductus makes him more prone to inversion injuries.  I will see him back following the MRI  - XR Ankle 2 View Left; Future  - XR Foot 2 View Left; Future  - MRI Ankle Left Without Contrast; Future    2. Avulsion fracture of ankle, left, sequela  As above  - MRI Ankle Left Without Contrast; Future            Radiology Ordered []  Radiology Reports Reviewed []      Radiology Images Reviewed []   Labs Reviewed []    Labs Ordered []   PCP Records Reviewed []    Provider Records Reviewed []    ER Records Reviewed []    Hospital Records  Reviewed []    History Obtained From Family []    Phone conversation with Provider []    Records Requested []               Patient seen and evaluated at bedside    Chief Complaint:    HPI:  Patient with separate chart from [facility], MRN# [id number]    57 YO M with a history of CVA with residual mild R paresthesias, second degree Mobitz II heart block s/p DC-ICD 12/2022 (initial concern for sarcoid but negative biopsy/PET post procedure), DM2 (A1c 8.4%), and HTN who was admitted to [facility] with chest pain and dyspnea, found to have NSTEMI with markedly elevated troponins and new severe LV dysfunction with regional wall motion abnormalities as well as + enterovirus. He required NIPPV and was diuresed before being transferred to [facility] where LHC performed which revealed severe 3v CAD with critical proximal LAD involvement. CTS was consulted for CABG evaluation and HF consulted for comanagement.    He ultimately underwent RHC with revealed elevated wedge but normal cardiac output. Transferred back to CICU for AMS and c.f low flow state and started on milrinone at direction of HF team Of note, patient has LV thrombus      Tele notable for persistent tachycardia, rates 130. C/f a. tach.     PMHx:   CVA (cerebrovascular accident)  T2DM (type 2 diabetes mellitus)  HTN (hypertension)  HLD (hyperlipidemia)        PSHx:   S/P cystoscopy  S/P hernia repair        Allergies:  No Known Allergies      Home Meds:  Current Medications:   aspirin enteric coated 81 milliGRAM(s) Oral daily  atorvastatin 80 milliGRAM(s) Oral at bedtime  benzocaine/menthol Lozenge 1 Lozenge Oral every 2 hours PRN  buMETAnide 2 milliGRAM(s) Oral daily  chlorhexidine 2% Cloths 1 Application(s) Topical daily  dextrose 5%. 1000 milliLiter(s) IV Continuous <Continuous>  dextrose 5%. 1000 milliLiter(s) IV Continuous <Continuous>  heparin  Infusion 1200 Unit(s)/Hr IV Continuous <Continuous>  insulin glargine Injectable (LANTUS) 24 Unit(s) SubCutaneous at bedtime  insulin lispro (ADMELOG) corrective regimen sliding scale   SubCutaneous three times a day before meals  insulin lispro (ADMELOG) corrective regimen sliding scale   SubCutaneous at bedtime  insulin lispro Injectable (ADMELOG) 8 Unit(s) SubCutaneous three times a day before meals  milrinone Infusion 0.125 MICROgram(s)/kG/Min IV Continuous <Continuous>  pantoprazole    Tablet 40 milliGRAM(s) Oral before breakfast  polyethylene glycol 3350 17 Gram(s) Oral daily PRN  senna 2 Tablet(s) Oral at bedtime  tamsulosin 0.4 milliGRAM(s) Oral at bedtime    REVIEW OF SYSTEMS:  All other review of systems is negative unless indicated above.    Physical Exam:  T(F): 98.7 (10-29), Max: 98.8 (10-28)  HR: 132 (10-29) (122 - 143)  BP: 81/60 (10-29) (70/50 - 121/70)  RR: 21 (10-29)  SpO2: 93% (10-29)  Appearance: No acute distress; well appearing  Eyes:  EOMI  HEENT: Normal oral mucosa  Cardiovascular: tachy  Respiratory: Clear to auscultation bilaterally  Gastrointestinal: soft, non-tender, non-distended  Extremities: no lower extremity edema   Neurologic: Non-focal  Psychiatry: AAOx3, mood & affect appropriate    Cardiovascular Diagnostic Testing:        Echo: Personally reviewed:  10/26    CONCLUSIONS:      1. Left ventricular systolic function is severely decreased with an ejection fraction visually estimated at <20 %.   2. Systolic function.   3. LV apical thrombus is seen.      Limited study.   4. Findings were discussed with Yenni LAFLEUR on 10/26/2023 at 1.29pm.   5. Small pericardial effusion noted adjacent to the right ventricle with no evidence of hemodynamic compromise.   6. There is a left ventricular thrombus.   7. There is normal LV mass and normal geometry.      Labs: Personally reviewed                        11.5   8.40  )-----------( 210      ( 29 Oct 2023 00:55 )             36.3     10-29    139  |  104  |  42<H>  ----------------------------<  216<H>  4.1   |  22  |  1.79<H>    Ca    8.8      29 Oct 2023 05:42  Phos  3.7     10-29  Mg     1.9     10-29    TPro  5.9<L>  /  Alb  2.9<L>  /  TBili  0.5  /  DBili  x   /  AST  13  /  ALT  25  /  AlkPhos  59  10-29    PT/INR - ( 29 Oct 2023 00:03 )   PT: 12.9 sec;   INR: 1.24 ratio         PTT - ( 29 Oct 2023 00:03 )  PTT:56.0 sec  Thyroid Stimulating Hormone, Serum: 0.39 uIU/mL (10-29 @ 00:03)       Patient seen and evaluated at bedside    Chief Complaint:    HPI:  Patient with separate chart from [facility], MRN# [id number]    57 YO M with a history of CVA with residual mild R paresthesias, second degree Mobitz II heart block s/p DC-ICD 2022 (initial concern for sarcoid but negative biopsy/PET post procedure), DM2 (A1c 8.4%), and HTN who was admitted to [facility] with chest pain and dyspnea, found to have NSTEMI with markedly elevated troponins and new severe LV dysfunction with regional wall motion abnormalities as well as + enterovirus. He required NIPPV and was diuresed before being transferred to [facility] where LHC performed which revealed severe 3v CAD with critical proximal LAD involvement. CTS was consulted for CABG evaluation and HF consulted for comanagement.    He ultimately underwent RHC with revealed elevated wedge but normal cardiac output. Transferred back to CICU for AMS and c.f low flow state and started on milrinone at direction of HF team Of note, patient has LV thrombus    Tele notable for persistent tachycardia, rates 130. C/f a. tach.     PMHx:   CVA (cerebrovascular accident)  T2DM (type 2 diabetes mellitus)  HTN (hypertension)  HLD (hyperlipidemia)      PSHx:   S/P cystoscopy  S/P hernia repair      Allergies:  No Known Allergies    Home Meds:  Current Medications:   aspirin enteric coated 81 milliGRAM(s) Oral daily  atorvastatin 80 milliGRAM(s) Oral at bedtime  benzocaine/menthol Lozenge 1 Lozenge Oral every 2 hours PRN  buMETAnide 2 milliGRAM(s) Oral daily  chlorhexidine 2% Cloths 1 Application(s) Topical daily  dextrose 5%. 1000 milliLiter(s) IV Continuous <Continuous>  dextrose 5%. 1000 milliLiter(s) IV Continuous <Continuous>  heparin  Infusion 1200 Unit(s)/Hr IV Continuous <Continuous>  insulin glargine Injectable (LANTUS) 24 Unit(s) SubCutaneous at bedtime  insulin lispro (ADMELOG) corrective regimen sliding scale   SubCutaneous three times a day before meals  insulin lispro (ADMELOG) corrective regimen sliding scale   SubCutaneous at bedtime  insulin lispro Injectable (ADMELOG) 8 Unit(s) SubCutaneous three times a day before meals  milrinone Infusion 0.125 MICROgram(s)/kG/Min IV Continuous <Continuous>  pantoprazole    Tablet 40 milliGRAM(s) Oral before breakfast  polyethylene glycol 3350 17 Gram(s) Oral daily PRN  senna 2 Tablet(s) Oral at bedtime  tamsulosin 0.4 milliGRAM(s) Oral at bedtime    REVIEW OF SYSTEMS:  All other review of systems is negative unless indicated above.    Physical Exam:  T(F): 98.7 (10-29), Max: 98.8 (10-28)  HR: 132 (10-) (122 - 143)  BP: 81/60 (10-) (70/50 - 121/70)  RR: 21 (10-29)  SpO2: 93% (10-)  Appearance: Mild distress, ill appearing   Eyes: PERRL, EOMI,  HEENT: Normal oral mucosa  Cardiovascular: Tachycardic RR, S1, S2, no murmurs, rubs, or gallops; no edema; minimal JVD  Respiratory: Clear to auscultation bilaterally  Gastrointestinal: soft, non-tender, non-distended  Lymphatic: No lymphadenopathy  Psychiatry: AAOx1-2 (name and )  Skin: No rashes, ecchymoses, or cyanosis        Echo:   10/26    CONCLUSIONS:      1. Left ventricular systolic function is severely decreased with an ejection fraction visually estimated at <20 %.   2. Systolic function.   3. LV apical thrombus is seen.      Limited study.   4. Findings were discussed with Yenni LAFLEUR on 10/26/2023 at 1.29pm.   5. Small pericardial effusion noted adjacent to the right ventricle with no evidence of hemodynamic compromise.   6. There is a left ventricular thrombus.   7. There is normal LV mass and normal geometry.      Labs: Personally reviewed                        11.5   8.40  )-----------( 210      ( 29 Oct 2023 00:55 )             36.3     10-29    139  |  104  |  42<H>  ----------------------------<  216<H>  4.1   |  22  |  1.79<H>    Ca    8.8      29 Oct 2023 05:42  Phos  3.7     10-29  Mg     1.9     10-29    TPro  5.9<L>  /  Alb  2.9<L>  /  TBili  0.5  /  DBili  x   /  AST  13  /  ALT  25  /  AlkPhos  59  10-29    PT/INR - ( 29 Oct 2023 00:03 )   PT: 12.9 sec;   INR: 1.24 ratio         PTT - ( 29 Oct 2023 00:03 )  PTT:56.0 sec  Thyroid Stimulating Hormone, Serum: 0.39 uIU/mL (10-29 @ 00:03)

## 2023-10-29 NOTE — PROGRESS NOTE ADULT - ASSESSMENT
====================ASSESSMENT ==============  57 yo Male pmhx CVA with mild left sided deficits, HTN, DM2, vertigo, HLD, Mobitz II s/pp Medtronic dual chamber ICD (12/21/22) initially presented to OSH for SOB c/f ADHF vs. PNA, later found to have NSTEMI transferred to [facility] for C evaluation. 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 Admitted to CICU for hypotension, inotropic support, hemodynamic monitoring. Previously downgraded on 10/22, RRT called while on the floor for AMS and hypotensive to 80/40, CTH 10/27 found possible R cerebellar infarct, patient re-transferred to CICU for inotropic support.    NEUROLOGICAL  #Possible R cerebellar infarct  #Encephalopathy   #Hx of CVA w/ mild L residual deficit  - currently A&Ox3  - CT head 10/27 w/ atrophy and small vessel white matter ischemic changes. R medial cerebellar infarct may be acute vs subacute  - pending MRI tomorrow 10/29 5PM  - neurology following  - B12, TSH, RPR wnl  - neuro checks    CARDIOVASCULAR  #Hypotension/cardiogenic shock, iso acute on chronic HFrEF  TTE 10/16: EF 20%, severely reduced LVSF  [facility] 10/16: 95% pLAD, 80% mid and distal LCx, 90% mid and distal RCA  RHC 10/19: RA 10, PCWP 20, CO 4.4, CI 2.3  cMR 10/18: limited viability in LAD territory  TTE 10/26: EF <20 %, LV apical thrombus is seen. Small pericardial effusion noted adjacent to the right ventricle with no evidence of hemodynamic compromise.  - trend lactate, proBNP  - c/w bumex 2 mg PO qd, for UO goal net neg 2-3L  - presently net positive, consider further bumex as BP allows  - pending HF recs regarding high risk PCI vs further intervention; EP recommended no indication for upgrade at this time. HF holding off on launching VAD/transplant eval  - hold Entresto i/s/o hypotension  - off hydral/nitrates  - s/p dig load  - avoid BB/CCB given borderline cardiac function  - start GDMT when appropriate  - RRT called for 80/40 while on the floor, milrinone 0.125 restarted on 10/28  - Pending swan placement, previous unsuccessful attempt RIJ with visible thrombus on US & LIJ small    #LV apical thrombus  -c/w heparin gtt  -MRI brain and MRA H/N when stable  -neuro checks    #Tachycardia  Mobitz II s/p AICD  - Likely reflex tachycardiac iso increased cardiac demand/ hypoxia vs. iso infxn  - s/p dig load  - per EP, no need to upgrade device at this time    #HLD  - c/w Atorv 80mg     RESPIRATORY  #Pulmonary edema  Likely iso CHF vs. superimposed PNA  CT chest 10/28: scattered patchy and nodular bilateral upper lobe predominant groundglass opacities with interlobular septal thickening representing pulmonary edema  -c/w diuresis for net neg goal  -infectious cause not excluded, low threshold to add abx if leukocytosis or febrile, as pt persistently tachy  -f/u noncontrast CT chest in 1-3 month to ensure resolution    GI/NUTRITION  No active issues  - CC DASH Halal diet  - GI ppx: PPI 40mg daily  - c/w Bowel regimen    /RENAL  #LAURA:   - sCr on admission 1.28, uptrending likely iso low flow state  -Trend sCr  -Monitor I/O  -c/w diuresis    #Elevated lactate - Improving  - uptrending lactate noted likely iso cardiogenic shock  - c/w Milrinone, wean as tolerated  - continue to trend    #BPH  - c/w Flomax 0.4mg qhs    SKIN  - no active issues    INFECTIOUS DISEASE  - No active issues  - plan for pulmonary edema as above  - Recent admission to OSH for ?PNA s/p 14 days of cefepime (last dose 10/14)  - Currently no signs of infection, WBC normal, afebrile, although monitor iso persistent tachycardia    ENDOCRINE    #DM2  - A1c 10/14 8.4%  - c/w Lantus 24u qhs + lispro 8u premeal + ISS  - trend FS, will likely need adjustment    #Thyroid nodule  CT chest: 3. 3 x 1.8 cm left supraclavicular nodule likely arising from the thyroid gland   -thyroid US outpatient    HEMATOLOGIC/DVT PPX  - No active issues  - DVT ppx: heparin gtt    #ETHICS  Full code ====================ASSESSMENT ==============  59 yo Male pmhx CVA with mild left sided deficits, HTN, DM2, vertigo, HLD, Mobitz II s/pp Medtronic dual chamber ICD (12/21/22) initially presented to OSH for SOB c/f ADHF vs. PNA, later found to have NSTEMI transferred to [facility] for C evaluation. 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 Admitted to CICU for hypotension, inotropic support, hemodynamic monitoring. Previously downgraded on 10/22, RRT called while on the floor for AMS and hypotensive to 80/40, CTH 10/27 found possible R cerebellar infarct, patient re-transferred to CICU for inotropic support.    NEUROLOGICAL  #Possible R cerebellar infarct  #Encephalopathy   #Hx of CVA w/ mild L residual deficit  - currently A&Ox3  - CT head 10/27 w/ atrophy and small vessel white matter ischemic changes. R medial cerebellar infarct may be acute vs subacute  - pending MRI tomorrow 10/29 5PM  - neurology following  - B12, TSH, RPR wnl  - neuro checks    CARDIOVASCULAR  #Hypotension/cardiogenic shock, iso acute on chronic HFrEF  TTE 10/16: EF 20%, severely reduced LVSF  [facility] 10/16: 95% pLAD, 80% mid and distal LCx, 90% mid and distal RCA  RHC 10/19: RA 10, PCWP 20, CO 4.4, CI 2.3  cMR 10/18: limited viability in LAD territory  TTE 10/26: EF <20 %, LV apical thrombus is seen. Small pericardial effusion noted adjacent to the right ventricle with no evidence of hemodynamic compromise.  - RRT called for 80/40 while on the floor, milrinone 0.125 restarted on 10/28  - trend lactate, proBNP  - c/w bumex 2 mg PO qd, for UO goal net neg 2-3L  - presently net positive, consider further bumex as BP allows  - pending HF recs regarding high risk PCI vs further intervention; EP recommended no indication for upgrade at this time. HF holding off on launching VAD/transplant eval  - hold Entresto i/s/o hypotension  - off hydral/nitrates  - s/p dig load, dig level 19 (10/29), will hold off further dose and recheck  - avoid BB/CCB given borderline cardiac function  - start GDMT when appropriate  - Pending swan placement, previous unsuccessful attempt RIJ with visible thrombus on US & LIJ small    #LV apical thrombus  -c/w heparin gtt  -MRI brain and MRA H/N when stable  -neuro checks    #Tachycardia  Mobitz II s/p AICD  - Likely reflex tachycardiac iso increased cardiac demand/ hypoxia vs. iso infxn  - s/p dig load, dig level 19 (10/29), will hold off further dose and recheck  - per EP, no need to upgrade device at this time    #HLD  - c/w Atorv 80mg     RESPIRATORY  #Pulmonary edema  Likely iso CHF vs. superimposed PNA  CT chest 10/28: scattered patchy and nodular bilateral upper lobe predominant groundglass opacities with interlobular septal thickening representing pulmonary edema  -c/w Bumex 2 qd   -infectious cause not excluded, low threshold to add abx if leukocytosis or febrile, as pt persistently tachy  -f/u noncontrast CT chest in 1-3 month to ensure resolution    GI/NUTRITION  No active issues  - CC DASH Halal diet  - GI ppx: PPI 40mg daily  - c/w Bowel regimen    /RENAL  #LAURA:   - sCr on admission 1.28, uptrending likely iso low flow state  -Trend sCr  -Monitor I/O  -c/w diuresis    #Elevated lactate - Improving  - uptrending lactate noted likely iso cardiogenic shock  - c/w Milrinone, wean as tolerated  - continue to trend    #BPH  - c/w Flomax 0.4mg qhs    SKIN  - no active issues    INFECTIOUS DISEASE  - No active issues  - plan for pulmonary edema as above  - Recent admission to OSH for ?PNA s/p 14 days of cefepime (last dose 10/14)  - Currently no signs of infection, WBC normal, afebrile, although monitor iso persistent tachycardia    ENDOCRINE    #DM2  - A1c 10/14 8.4%  - c/w Lantus 24u qhs + lispro 8u premeal + ISS  - trend FS, will likely need adjustment    #Thyroid nodule  CT chest: 3. 3 x 1.8 cm left supraclavicular nodule likely arising from the thyroid gland   -thyroid US outpatient    HEMATOLOGIC/DVT PPX  - No active issues  - DVT ppx: heparin gtt    #ETHICS  Full code

## 2023-10-29 NOTE — PROGRESS NOTE ADULT - ASSESSMENT
57 YO M with a history of CVA with residual mild R paresthesias, second degree Mobitz II heart block s/p DC-ICD 12/2022 (initial concern for sarcoid but negative biopsy/PET post procedure), DM2 (A1c 8.4%), and HTN who was admitted to [facility] with chest pain and dyspnea, found to have NSTEMI with markedly elevated troponins and new severe LV dysfunction with regional wall motion abnormalities as well as + enterovirus. 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 CTS was consulted for CABG evaluation and HF consulted for comanagement.    He ultimately underwent RHC with revealed elevated wedge but normal cardiac output. Within the past 24 hours patient has become more tachycardic, is cool on exam and noted to have elevated JVP. He was transferred to CICU for swan placement and closer observation of hemodynamics. At this time patient would be consider poor candidate for CABG however would  benefit from high risk PCI with the potential need for advance therapies. Found to have LVT and need for AT eval, as well as c/f low flow status thus sent back to CCU 10/28 for r/o CS.    REVIEW OF STUDIES  TTE 10/26: EF <20% LVT present, small pericardial effusion w/o tamponade  RHC 10/19: RA 10, RV 47/9/13, Wedge 29, PA 41/26/33, CO/CI 4.4/2.3, PA sat 57.3  EKG: sinus tachycardia, septal q-waves, left axis deviation   TTE 10/16/23: LV 5.5 cm, LVEF 20% with regional WMAs worse in the apex, LVOT VTI 8 cm, normal RV size/function, severe functional MR, small pericardial effusion, estimated RA pressure 8 mmHg   TTE 12/2022: normal LVEF   [facility]: 95% discrete proximal LAD disease and sequential multifocal disease with good distal target, 80-90% proximal LCx disease just prior to marginals, severe multifocal RCA disease with good targets     Hemodynamics:  10/21/23: RA 13 with V-wave 21, PA 50/33, PCW 33, MvO2 68.2, Cris CO/CI 4.4/2.56

## 2023-10-29 NOTE — PROGRESS NOTE ADULT - SUBJECTIVE AND OBJECTIVE BOX
Patient seen and examined at bedside.    Overnight Events: Upgraded to CCU yesterday for elevated LA and c/f low flow state. Started on milrinone 0.125, LA 3.3-->1.7 -->1.8. Dig level supratherapeutic at 1.8. Rhtyhm on tele c/f possible AT vs 2:1 AFlutter. Primary team unable to obtain central access due to thrombi and poorly accessible vessels. May have cath lab femoral swan placement as well as device interrogation and QOD dig with level. Neuro following and helping with poor mental status work up.     Review of Systems: Negative except as per HPI     Current Meds:  -aspirin enteric coated 81 milliGRAM(s) Oral daily  atorvastatin 80 milliGRAM(s) Oral at bedtime  benzocaine/menthol Lozenge 1 Lozenge Oral every 2 hours PRN  buMETAnide 2 milliGRAM(s) Oral daily  chlorhexidine 2% Cloths 1 Application(s) Topical daily  dextrose 5%. 1000 milliLiter(s) IV Continuous <Continuous>  dextrose 5%. 1000 milliLiter(s) IV Continuous <Continuous>  heparin  Infusion 1200 Unit(s)/Hr IV Continuous <Continuous>  insulin glargine Injectable (LANTUS) 24 Unit(s) SubCutaneous at bedtime  insulin lispro (ADMELOG) corrective regimen sliding scale   SubCutaneous three times a day before meals  insulin lispro (ADMELOG) corrective regimen sliding scale   SubCutaneous at bedtime  insulin lispro Injectable (ADMELOG) 8 Unit(s) SubCutaneous three times a day before meals  milrinone Infusion 0.125 MICROgram(s)/kG/Min IV Continuous <Continuous>  pantoprazole    Tablet 40 milliGRAM(s) Oral before breakfast  polyethylene glycol 3350 17 Gram(s) Oral daily PRN  senna 2 Tablet(s) Oral at bedtime  tamsulosin 0.4 milliGRAM(s) Oral at bedtime      Vitals:  T(F): 98.7 (10-), Max: 98.8 (10-)  HR: 132 (10-) (122 - 143)  BP: 81/60 (10-) (70/50 - 121/70)  RR: 21 (10-)  SpO2: 93% (10-)  I&O's Summary    28 Oct 2023 07:01  -  29 Oct 2023 07:00  --------------------------------------------------------  IN: 497.6 mL / OUT: 550 mL / NET: -52.4 mL        Physical Exam:  Appearance: Mild distress, ill appearing   Eyes: PERRL, EOMI, pink conjunctiva  HEENT: Normal oral mucosa  Cardiovascular: Tachycardic RR, S1, S2, no murmurs, rubs, or gallops; no edema; minimal JVD  Respiratory: Clear to auscultation bilaterally  Gastrointestinal: soft, non-tender, non-distended with normal bowel sounds  Musculoskeletal: No clubbing; no joint deformity   Neurologic: Non-focal  Lymphatic: No lymphadenopathy  Psychiatry: AAOx1-2 (name and ), pleasantly confused   Skin: No rashes, ecchymoses, or cyanosis                          11.5   8.40  )-----------( 210      ( 29 Oct 2023 00:55 )             36.3     10-29    139  |  104  |  42<H>  ----------------------------<  216<H>  4.1   |  22  |  1.79<H>    Ca    8.8      29 Oct 2023 05:42  Phos  3.7     10-29  Mg     1.9     10-29    TPro  5.9<L>  /  Alb  2.9<L>  /  TBili  0.5  /  DBili  x   /  AST  13  /  ALT  25  /  AlkPhos  59  10-29    PT/INR - ( 29 Oct 2023 00:03 )   PT: 12.9 sec;   INR: 1.24 ratio    PTT - ( 29 Oct 2023 00:03 )  PTT:56.0 sec

## 2023-10-29 NOTE — PROGRESS NOTE ADULT - SUBJECTIVE AND OBJECTIVE BOX
Neurology        S: patient seen and examined. [facility] with cerebellar infarct now in ICU        Medications: MEDICATIONS  (STANDING):  aspirin enteric coated 81 milliGRAM(s) Oral daily  atorvastatin 80 milliGRAM(s) Oral at bedtime  buMETAnide 2 milliGRAM(s) Oral daily  chlorhexidine 2% Cloths 1 Application(s) Topical daily  dextrose 5%. 1000 milliLiter(s) (50 mL/Hr) IV Continuous <Continuous>  dextrose 5%. 1000 milliLiter(s) (100 mL/Hr) IV Continuous <Continuous>  heparin  Infusion 1200 Unit(s)/Hr (12 mL/Hr) IV Continuous <Continuous>  insulin glargine Injectable (LANTUS) 24 Unit(s) SubCutaneous at bedtime  insulin lispro (ADMELOG) corrective regimen sliding scale   SubCutaneous three times a day before meals  insulin lispro (ADMELOG) corrective regimen sliding scale   SubCutaneous at bedtime  insulin lispro Injectable (ADMELOG) 8 Unit(s) SubCutaneous three times a day before meals  milrinone Infusion 0.125 MICROgram(s)/kG/Min (2.63 mL/Hr) IV Continuous <Continuous>  pantoprazole    Tablet 40 milliGRAM(s) Oral before breakfast  senna 2 Tablet(s) Oral at bedtime  tamsulosin 0.4 milliGRAM(s) Oral at bedtime    MEDICATIONS  (PRN):  benzocaine/menthol Lozenge 1 Lozenge Oral every 2 hours PRN Sore Throat  polyethylene glycol 3350 17 Gram(s) Oral daily PRN Constipation       Vitals:  Vital Signs Last 24 Hrs  T(C): 37.1 (29 Oct 2023 08:00), Max: 37.1 (28 Oct 2023 19:00)  T(F): 98.7 (29 Oct 2023 08:00), Max: 98.8 (28 Oct 2023 19:00)  HR: 133 (29 Oct 2023 09:00) (122 - 143)  BP: 86/50 (29 Oct 2023 09:00) (70/50 - 121/70)  BP(mean): 61 (29 Oct 2023 09:00) (56 - 90)  RR: 26 (29 Oct 2023 09:00) (18 - 45)  SpO2: 94% (29 Oct 2023 09:00) (91% - 97%)    Parameters below as of 29 Oct 2023 08:00  Patient On (Oxygen Delivery Method): room air            General Exam:   General Appearance: Appropriately dressed and in no acute distress       Head: Normocephalic, atraumatic and no dysmorphic features  Ear, Nose, and Throat: Moist mucous membranes  CVS: S1S2+  Resp: No SOB, no wheeze or rhonchi  GI: soft NT/ND  Extremities: No edema or cyanosis  Skin: No bruises or rashes     Neurological Exam:  Mental Status: Awake, alert and oriented x 2.  Able to follow simple verbal commands. Able to name and repeat. fluent speech. No obvious aphasia or dysarthria noted. poor insight. not understanding why he is in hospital   Cranial Nerves: PERRL, EOMI, VFFC, sensation V1-V3 intact,  no obvious facial asymmetry, equal elevation of palate, scm/trap 5/5, tongue is midline on protrusion. no obvious papilledema on fundoscopic exam. hearing is grossly intact.   Motor: Normal bulk, tone and strength throughout. Fine finger movements were intact and symmetric. no tremors or drift noted.    Sensation: Intact to light touch and pinprick throughout. no right/left confusion. no extinction to tactile on DSS. Romberg was negative.   Reflexes: 1+ throughout at biceps, brachioradialis, triceps, patellars and ankles bilaterally and equal. No clonus. R toe and L toe were both downgoing.  Coordination: No dysmetria on FNF or HKS  Gait:   no limitations in gait.     Data/Labs/Imaging which I personally reviewed.     Labs:       LABS:                          11.5   8.40  )-----------( 210      ( 29 Oct 2023 00:55 )             36.3     10-29    139  |  104  |  42<H>  ----------------------------<  216<H>  4.1   |  22  |  1.79<H>    Ca    8.8      29 Oct 2023 05:42  Phos  3.7     10-29  Mg     1.9     10-29    TPro  5.9<L>  /  Alb  2.9<L>  /  TBili  0.5  /  DBili  x   /  AST  13  /  ALT  25  /  AlkPhos  59  10-29    LIVER FUNCTIONS - ( 29 Oct 2023 05:42 )  Alb: 2.9 g/dL / Pro: 5.9 g/dL / ALK PHOS: 59 U/L / ALT: 25 U/L / AST: 13 U/L / GGT: x           PT/INR - ( 29 Oct 2023 00:03 )   PT: 12.9 sec;   INR: 1.24 ratio         PTT - ( 29 Oct 2023 00:03 )  PTT:56.0 sec  Urinalysis Basic - ( 29 Oct 2023 05:42 )    Color: x / Appearance: x / SG: x / pH: x  Gluc: 216 mg/dL / Ketone: x  / Bili: x / Urobili: x   Blood: x / Protein: x / Nitrite: x   Leuk Esterase: x / RBC: x / WBC x   Sq Epi: x / Non Sq Epi: x / Bacteria: x            < from: CT Head No Cont (10.27.23 @ 18:04) >    ACC: [id number] EXAM:  CT BRAIN   ORDERED BY: BRETT HOPSON     PROCEDURE DATE:  10/27/2023          INTERPRETATION:  Clinical Indication: CVA    5mm axial sections of the brain were obtained from base to vertex,   without the intravenous administration of contrast material. Coronal and   sagittal computer generated reconstructed views are available.    No prior brain imaging is available for comparison.          The ventricles and sulci are prominent consistent with mild atrophy.   Small vesselwhite matter ischemic changes are noted. There is a infarct   in the right medial cerebellum of undetermined age which could be acute   to subacute based on its degree of lucency. There is no significant   hemorrhage. Bone window examination is unremarkable. There is been   previous right-sided lens replacement surgery.      IMPRESSION: Atrophy and small vessel white matter ischemic changes. Right   medial cerebellar infarct may be acute versus subacute. No hemorrhage.      --- End of Report ---            ANDREW TONEY MD; Attending Radiologist  This document has been electronically signed. Oct 27 2023  6:25PM    < end of copied text >

## 2023-10-29 NOTE — PROGRESS NOTE ADULT - NSPROGADDITIONALINFOA_GEN_ALL_CORE
57 yo M with prior CVA and residual L sided motor weakness, HTN, DM2, vertigo, HLD, Mobitz II s/p Medtronic dual chamber ICD (12/21/22) admitted for ADHF. S/p LHC on 10/16 w/ 3v disease, RHC on 10/19 for hemodynamic assessment, cMR w/ limited viability in LAD territory and TTE 10/28 w/ EF<20% and apical thrombus. Admitted to CICU for hypotension, inotropic support, hemodynamic monitoring.    Neuro: some problems with attention/ cognition but AOx4, residual L sided weakness. Awaiting brain MRI for R cerebellar abnormality.   Pulm: on RA  CV: ischemic HFrEF, will interrogate rhythm. Dig level elevated to 1.8, hold and recheck. cont milrinone 0.125. Attempt swan today but RIJ clot noted and LIJ vessel very small. Will need MRI first  Renal: cont bumex 2mg daily  ID: no issues  GI: DASH diet  Heme: cont heparin gtt for LV thrombus seen on TTE on 10/26  Lines: No central access

## 2023-10-29 NOTE — PROGRESS NOTE ADULT - ATTENDING COMMENTS
Given persistent tachycardia, borderline low BP, lactate, and elevated BNP, moved to CICU for closer management. Unable to place central line due to anatomy. Empirically started on milrinone. Denies complaints. Remains disoriented and unable to provide year, location, month. On exam, JVP approx 8-10 with HJR, RRR, possible S3, CTAB, nontender abdomen, no pedal edema, WWP. Labs reviewed - BUN/Cr 42/1.79 (uptrending; b/l 1.48), trop I 25k (9/26), albumin 3.2, lactate 1.8 (prev 3.3), AST/ALT wnl, BNP 14k. TTE reviewed - EF 20% (segmental; only base preserved), LVOT VTI 8 cm, severe MR. ECG - possible atrial tachycardia. Currently stage C/D HF, NYHA class III with concerning features of persistent tachycardia, renal dysfunction, borderline low BP. Unclear if cognitive issues are driven by possible low output state however unlikely; ? embolic event.   - adjust diuretics to goal net negative  - would place [place]; reattempt IJ approach and if unable, plan to do groin [place]  - c/w mil 0.25 mcg/kg/min  - s/p dig load; level elevated; hold dig and follow  - place A-line  - attempted to reach wife but unable to connect

## 2023-10-29 NOTE — PROGRESS NOTE ADULT - ASSESSMENT
57 yo Male with prior Stroke with mild ?left sided deficits (improved), HTN, DM2, vertigo, HLD, Mobitz II s/pp Medtronic dual chamber ICD (12/21/22) initially presented to [facility] from home with complaints of dyspnea and chest pain and was admitted w/ acute hypoxic respiratory failure likely due to acute pulmonary edema due to acute HFrEF in setting of acute NSTEMI, LAURA and enterovirus infection. Pt with brief MICU stay at [facility] requiring bipap (no intubation), was treated for PNA with cefepime, and was found to have TTE done 9/26/23 showing EF 20% with severely decreased LVSF, multiple regional wall motion abnormalities, and elevated LV end diastolic pressure. Pt was transferred to [facility] for cardiac eval.   TTE EF < 20%  RHC 10/19: RA 10, RV 47/9/13, Wedge 29, PA 41/26/33, CO/CI 4.4/2.3, PA sat 57.3  EKG: sinus tachycardia, septal q-waves, left axis deviation   TTE 10/16/23: LV 5.5 cm, LVEF 20% with regional WMAs worse in the apex, LVOT VTI 8 cm, normal RV size/function, severe functional MR, small pericardial effusion, estimated RA pressure 8 mmHg   TTE 12/2022: normal LVEF   LHC: 95% discrete proximal LAD disease and sequential multifocal disease with good distal target, 80-90% proximal LCx disease just prior to marginals, severe multifocal RCA disease with good targets   A1c 8.4    CD 10/17 neg   NIHSS 1  CTH with age indeterminate R cerebellar infarct.  likely chronic     Impression:   R cerebellar infarct, age indeterminate, likely chronic   cognitive issues, not understanding why he is in hospital r/o embolic shower. 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   - no objection to heparin drip for low EF and LV thrombus   - called for risk stratification for heart transplant eval, likel;y low risk and no objection but would check imaging firts   - check MRI brain and MRA H/N   - b12, TSH, RPR for reversible causes of dementia   - consider psych eval   - telemetry  - PT/OT   - check FS, glucose control <180  - GI/DVT ppx   - Thank you for allowing me to participate in the care of this patient. Call with questions.   spoke with primary team and cardiac fellow   Abelardo Irving MD  Vascular Neurology  Office: [phone]

## 2023-10-30 LAB
ALBUMIN SERPL ELPH-MCNC: 2.9 G/DL — LOW (ref 3.3–5)
ALBUMIN SERPL ELPH-MCNC: 2.9 G/DL — LOW (ref 3.3–5)
ALP SERPL-CCNC: 55 U/L — SIGNIFICANT CHANGE UP (ref 40–120)
ALP SERPL-CCNC: 55 U/L — SIGNIFICANT CHANGE UP (ref 40–120)
ALT FLD-CCNC: 21 U/L — SIGNIFICANT CHANGE UP (ref 10–45)
ALT FLD-CCNC: 21 U/L — SIGNIFICANT CHANGE UP (ref 10–45)
ANION GAP SERPL CALC-SCNC: 11 MMOL/L — SIGNIFICANT CHANGE UP (ref 5–17)
ANION GAP SERPL CALC-SCNC: 11 MMOL/L — SIGNIFICANT CHANGE UP (ref 5–17)
APTT BLD: 60.5 SEC — HIGH (ref 24.5–35.6)
APTT BLD: 60.5 SEC — HIGH (ref 24.5–35.6)
APTT BLD: 73.1 SEC — HIGH (ref 24.5–35.6)
APTT BLD: 73.1 SEC — HIGH (ref 24.5–35.6)
APTT BLD: 78.3 SEC — HIGH (ref 24.5–35.6)
APTT BLD: 78.3 SEC — HIGH (ref 24.5–35.6)
AST SERPL-CCNC: 13 U/L — SIGNIFICANT CHANGE UP (ref 10–40)
AST SERPL-CCNC: 13 U/L — SIGNIFICANT CHANGE UP (ref 10–40)
BILIRUB SERPL-MCNC: 0.3 MG/DL — SIGNIFICANT CHANGE UP (ref 0.2–1.2)
BILIRUB SERPL-MCNC: 0.3 MG/DL — SIGNIFICANT CHANGE UP (ref 0.2–1.2)
BUN SERPL-MCNC: 44 MG/DL — HIGH (ref 7–23)
BUN SERPL-MCNC: 44 MG/DL — HIGH (ref 7–23)
CALCIUM SERPL-MCNC: 8.3 MG/DL — LOW (ref 8.4–10.5)
CALCIUM SERPL-MCNC: 8.3 MG/DL — LOW (ref 8.4–10.5)
CHLORIDE SERPL-SCNC: 105 MMOL/L — SIGNIFICANT CHANGE UP (ref 96–108)
CHLORIDE SERPL-SCNC: 105 MMOL/L — SIGNIFICANT CHANGE UP (ref 96–108)
CO2 SERPL-SCNC: 22 MMOL/L — SIGNIFICANT CHANGE UP (ref 22–31)
CO2 SERPL-SCNC: 22 MMOL/L — SIGNIFICANT CHANGE UP (ref 22–31)
CREAT SERPL-MCNC: 1.87 MG/DL — HIGH (ref 0.5–1.3)
CREAT SERPL-MCNC: 1.87 MG/DL — HIGH (ref 0.5–1.3)
DIGOXIN SERPL-MCNC: 1.4 NG/ML — SIGNIFICANT CHANGE UP (ref 0.8–2)
DIGOXIN SERPL-MCNC: 1.4 NG/ML — SIGNIFICANT CHANGE UP (ref 0.8–2)
EGFR: 42 ML/MIN/1.73M2 — LOW
EGFR: 42 ML/MIN/1.73M2 — LOW
GAS PNL BLDA: SIGNIFICANT CHANGE UP
GLUCOSE BLDC GLUCOMTR-MCNC: 117 MG/DL — HIGH (ref 70–99)
GLUCOSE BLDC GLUCOMTR-MCNC: 117 MG/DL — HIGH (ref 70–99)
GLUCOSE BLDC GLUCOMTR-MCNC: 167 MG/DL — HIGH (ref 70–99)
GLUCOSE BLDC GLUCOMTR-MCNC: 167 MG/DL — HIGH (ref 70–99)
GLUCOSE BLDC GLUCOMTR-MCNC: 180 MG/DL — HIGH (ref 70–99)
GLUCOSE BLDC GLUCOMTR-MCNC: 180 MG/DL — HIGH (ref 70–99)
GLUCOSE BLDC GLUCOMTR-MCNC: 196 MG/DL — HIGH (ref 70–99)
GLUCOSE BLDC GLUCOMTR-MCNC: 196 MG/DL — HIGH (ref 70–99)
GLUCOSE SERPL-MCNC: 218 MG/DL — HIGH (ref 70–99)
GLUCOSE SERPL-MCNC: 218 MG/DL — HIGH (ref 70–99)
HCT VFR BLD CALC: 31.4 % — LOW (ref 39–50)
HCT VFR BLD CALC: 31.4 % — LOW (ref 39–50)
HGB BLD-MCNC: 10 G/DL — LOW (ref 13–17)
HGB BLD-MCNC: 10 G/DL — LOW (ref 13–17)
INR BLD: 1.18 RATIO — SIGNIFICANT CHANGE UP (ref 0.85–1.18)
INR BLD: 1.18 RATIO — SIGNIFICANT CHANGE UP (ref 0.85–1.18)
LACTATE SERPL-SCNC: 1.2 MMOL/L — SIGNIFICANT CHANGE UP (ref 0.5–2)
LACTATE SERPL-SCNC: 1.2 MMOL/L — SIGNIFICANT CHANGE UP (ref 0.5–2)
MAGNESIUM SERPL-MCNC: 2.2 MG/DL — SIGNIFICANT CHANGE UP (ref 1.6–2.6)
MAGNESIUM SERPL-MCNC: 2.2 MG/DL — SIGNIFICANT CHANGE UP (ref 1.6–2.6)
MCHC RBC-ENTMCNC: 25.6 PG — LOW (ref 27–34)
MCHC RBC-ENTMCNC: 25.6 PG — LOW (ref 27–34)
MCHC RBC-ENTMCNC: 31.8 GM/DL — LOW (ref 32–36)
MCHC RBC-ENTMCNC: 31.8 GM/DL — LOW (ref 32–36)
MCV RBC AUTO: 80.3 FL — SIGNIFICANT CHANGE UP (ref 80–100)
MCV RBC AUTO: 80.3 FL — SIGNIFICANT CHANGE UP (ref 80–100)
NRBC # BLD: 0 /100 WBCS — SIGNIFICANT CHANGE UP (ref 0–0)
NRBC # BLD: 0 /100 WBCS — SIGNIFICANT CHANGE UP (ref 0–0)
NT-PROBNP SERPL-SCNC: HIGH PG/ML (ref 0–300)
NT-PROBNP SERPL-SCNC: HIGH PG/ML (ref 0–300)
PHOSPHATE SERPL-MCNC: 4.4 MG/DL — SIGNIFICANT CHANGE UP (ref 2.5–4.5)
PHOSPHATE SERPL-MCNC: 4.4 MG/DL — SIGNIFICANT CHANGE UP (ref 2.5–4.5)
PLATELET # BLD AUTO: 211 K/UL — SIGNIFICANT CHANGE UP (ref 150–400)
PLATELET # BLD AUTO: 211 K/UL — SIGNIFICANT CHANGE UP (ref 150–400)
POTASSIUM SERPL-MCNC: 3.9 MMOL/L — SIGNIFICANT CHANGE UP (ref 3.5–5.3)
POTASSIUM SERPL-MCNC: 3.9 MMOL/L — SIGNIFICANT CHANGE UP (ref 3.5–5.3)
POTASSIUM SERPL-SCNC: 3.9 MMOL/L — SIGNIFICANT CHANGE UP (ref 3.5–5.3)
POTASSIUM SERPL-SCNC: 3.9 MMOL/L — SIGNIFICANT CHANGE UP (ref 3.5–5.3)
PROT SERPL-MCNC: 5.7 G/DL — LOW (ref 6–8.3)
PROT SERPL-MCNC: 5.7 G/DL — LOW (ref 6–8.3)
PROTHROM AB SERPL-ACNC: 12.9 SEC — SIGNIFICANT CHANGE UP (ref 9.5–13)
PROTHROM AB SERPL-ACNC: 12.9 SEC — SIGNIFICANT CHANGE UP (ref 9.5–13)
RBC # BLD: 3.91 M/UL — LOW (ref 4.2–5.8)
RBC # BLD: 3.91 M/UL — LOW (ref 4.2–5.8)
RBC # FLD: 15.5 % — HIGH (ref 10.3–14.5)
RBC # FLD: 15.5 % — HIGH (ref 10.3–14.5)
SODIUM SERPL-SCNC: 138 MMOL/L — SIGNIFICANT CHANGE UP (ref 135–145)
SODIUM SERPL-SCNC: 138 MMOL/L — SIGNIFICANT CHANGE UP (ref 135–145)
WBC # BLD: 8.11 K/UL — SIGNIFICANT CHANGE UP (ref 3.8–10.5)
WBC # BLD: 8.11 K/UL — SIGNIFICANT CHANGE UP (ref 3.8–10.5)
WBC # FLD AUTO: 8.11 K/UL — SIGNIFICANT CHANGE UP (ref 3.8–10.5)
WBC # FLD AUTO: 8.11 K/UL — SIGNIFICANT CHANGE UP (ref 3.8–10.5)

## 2023-10-30 PROCEDURE — 99292 CRITICAL CARE ADDL 30 MIN: CPT

## 2023-10-30 PROCEDURE — 90791 PSYCH DIAGNOSTIC EVALUATION: CPT

## 2023-10-30 PROCEDURE — 93970 EXTREMITY STUDY: CPT | Mod: 26

## 2023-10-30 PROCEDURE — 99233 SBSQ HOSP IP/OBS HIGH 50: CPT

## 2023-10-30 PROCEDURE — 93010 ELECTROCARDIOGRAM REPORT: CPT

## 2023-10-30 PROCEDURE — 70544 MR ANGIOGRAPHY HEAD W/O DYE: CPT | Mod: 26,59

## 2023-10-30 PROCEDURE — 99291 CRITICAL CARE FIRST HOUR: CPT

## 2023-10-30 PROCEDURE — 71046 X-RAY EXAM CHEST 2 VIEWS: CPT | Mod: 26

## 2023-10-30 PROCEDURE — 70551 MRI BRAIN STEM W/O DYE: CPT | Mod: 26

## 2023-10-30 PROCEDURE — 70450 CT HEAD/BRAIN W/O DYE: CPT | Mod: 26

## 2023-10-30 PROCEDURE — 70547 MR ANGIOGRAPHY NECK W/O DYE: CPT | Mod: 26

## 2023-10-30 PROCEDURE — 99291 CRITICAL CARE FIRST HOUR: CPT | Mod: GC

## 2023-10-30 PROCEDURE — 93451 RIGHT HEART CATH: CPT | Mod: 26

## 2023-10-30 PROCEDURE — 99221 1ST HOSP IP/OBS SF/LOW 40: CPT

## 2023-10-30 RX ORDER — ACETAMINOPHEN 500 MG
1000 TABLET ORAL ONCE
Refills: 0 | Status: COMPLETED | OUTPATIENT
Start: 2023-10-30 | End: 2023-10-30

## 2023-10-30 RX ORDER — DIGOXIN 250 MCG
125 TABLET ORAL DAILY
Refills: 0 | Status: DISCONTINUED | OUTPATIENT
Start: 2023-10-30 | End: 2023-11-07

## 2023-10-30 RX ADMIN — PANTOPRAZOLE SODIUM 40 MILLIGRAM(S): 20 TABLET, DELAYED RELEASE ORAL at 07:54

## 2023-10-30 RX ADMIN — Medication 125 MICROGRAM(S): at 07:26

## 2023-10-30 RX ADMIN — SENNA PLUS 2 TABLET(S): 8.6 TABLET ORAL at 23:47

## 2023-10-30 RX ADMIN — HEPARIN SODIUM 11 UNIT(S)/HR: 5000 INJECTION INTRAVENOUS; SUBCUTANEOUS at 23:48

## 2023-10-30 RX ADMIN — MILRINONE LACTATE 5.25 MICROGRAM(S)/KG/MIN: 1 INJECTION, SOLUTION INTRAVENOUS at 05:07

## 2023-10-30 RX ADMIN — HEPARIN SODIUM 11 UNIT(S)/HR: 5000 INJECTION INTRAVENOUS; SUBCUTANEOUS at 06:50

## 2023-10-30 RX ADMIN — MILRINONE LACTATE 5.25 MICROGRAM(S)/KG/MIN: 1 INJECTION, SOLUTION INTRAVENOUS at 07:52

## 2023-10-30 RX ADMIN — HEPARIN SODIUM 12 UNIT(S)/HR: 5000 INJECTION INTRAVENOUS; SUBCUTANEOUS at 05:07

## 2023-10-30 RX ADMIN — MILRINONE LACTATE 5.25 MICROGRAM(S)/KG/MIN: 1 INJECTION, SOLUTION INTRAVENOUS at 23:48

## 2023-10-30 RX ADMIN — ATORVASTATIN CALCIUM 80 MILLIGRAM(S): 80 TABLET, FILM COATED ORAL at 23:47

## 2023-10-30 RX ADMIN — HEPARIN SODIUM 11 UNIT(S)/HR: 5000 INJECTION INTRAVENOUS; SUBCUTANEOUS at 07:52

## 2023-10-30 RX ADMIN — Medication 1000 MILLIGRAM(S): at 18:54

## 2023-10-30 RX ADMIN — Medication 400 MILLIGRAM(S): at 18:24

## 2023-10-30 RX ADMIN — BUMETANIDE 2 MILLIGRAM(S): 0.25 INJECTION INTRAMUSCULAR; INTRAVENOUS at 05:07

## 2023-10-30 RX ADMIN — TAMSULOSIN HYDROCHLORIDE 0.4 MILLIGRAM(S): 0.4 CAPSULE ORAL at 23:47

## 2023-10-30 RX ADMIN — CHLORHEXIDINE GLUCONATE 1 APPLICATION(S): 213 SOLUTION TOPICAL at 23:47

## 2023-10-30 RX ADMIN — Medication 1: at 11:18

## 2023-10-30 RX ADMIN — INSULIN GLARGINE 26 UNIT(S): 100 INJECTION, SOLUTION SUBCUTANEOUS at 23:48

## 2023-10-30 RX ADMIN — Medication 10 UNIT(S): at 18:23

## 2023-10-30 RX ADMIN — Medication 81 MILLIGRAM(S): at 11:18

## 2023-10-30 RX ADMIN — Medication 1: at 07:53

## 2023-10-30 RX ADMIN — Medication 1: at 18:23

## 2023-10-30 NOTE — PROGRESS NOTE ADULT - SUBJECTIVE AND OBJECTIVE BOX
Patient is a 58y old  Male who presents with a chief complaint of NSTEMI (28 Oct 2023 19:24)    HPI:  Patient with separate chart from [facility], MRN# [id number]    57 yo Male pmhx CVA with mild left sided deficits, HTN, DM2, vertigo, HLD, Mobitz II s/pp Medtronic dual chamber ICD (22) initially presented to [facility] from home with complaints of dyspnea and chest pain and was admitted w/ acute hypoxic respiratory failure likely due to acute pulmonary edema due to acute HFrEF in setting of acute NSTEMI, LAURA and enterovirus infection. Pt with brief MICU stay at [facility] requiring bipap (no intubation), was treated for PNA with cefepime, and was found to have TTE done 23 showing EF 20% with severely decreased LVSF, multiple regional wall motion abnormalities, and elevated LV end diastolic pressure. Pt was transferred to [facility] for cardiac cath evaluation. Patient without any acute complaints, complaining of intermittent palpitations for the last 2 days. No chest pain, SOB, fevers, nausea, vomiting, abdominal pain.  (13 Oct 2023 21:05)       INTERVAL HPI/OVERNIGHT EVENTS:   No overnight events   Afebrile, hemodynamically stable, persistently tachycardic to 120-130s    Subjective: Feeling well, no acute complaints, denies cp/sob/dizziness/palpitations.    ICU Vital Signs Last 24 Hrs  T(C): 36.8 (29 Oct 2023 05:00), Max: 37.1 (28 Oct 2023 19:00)  T(F): 98.3 (29 Oct 2023 05:00), Max: 98.8 (28 Oct 2023 19:00)  HR: 132 (29 Oct 2023 07:00) (122 - 143)  BP: 70/50 (29 Oct 2023 07:00) (70/50 - 121/70)  BP(mean): 56 (29 Oct 2023 07:00) (56 - 90)  ABP: --  ABP(mean): --  RR: 24 (29 Oct 2023 07:00) (18 - 45)  SpO2: 91% (29 Oct 2023 07:00) (91% - 97%)    O2 Parameters below as of 29 Oct 2023 07:00  Patient On (Oxygen Delivery Method): room air          I&O's Summary    28 Oct 2023 07:01  -  29 Oct 2023 07:00  --------------------------------------------------------  IN: 497.6 mL / OUT: 550 mL / NET: -52.4 mL          Daily     Daily Weight in k.5 (28 Oct 2023 12:15)    Adult Advanced Hemodynamics Last 24 Hrs  CVP(mm Hg): --  CVP(cm H2O): --  CO: --  CI: --  PA: --  PA(mean): --  PCWP: --  SVR: --  SVRI: --  PVR: --  PVRI: --    EKG/Telemetry Events:    MEDICATIONS  (STANDING):  aspirin enteric coated 81 milliGRAM(s) Oral daily  atorvastatin 80 milliGRAM(s) Oral at bedtime  buMETAnide 2 milliGRAM(s) Oral daily  chlorhexidine 2% Cloths 1 Application(s) Topical daily  dextrose 5%. 1000 milliLiter(s) (50 mL/Hr) IV Continuous <Continuous>  dextrose 5%. 1000 milliLiter(s) (100 mL/Hr) IV Continuous <Continuous>  digoxin     Tablet 125 MICROGram(s) Oral daily  heparin  Infusion 1200 Unit(s)/Hr (12 mL/Hr) IV Continuous <Continuous>  insulin glargine Injectable (LANTUS) 24 Unit(s) SubCutaneous at bedtime  insulin lispro (ADMELOG) corrective regimen sliding scale   SubCutaneous three times a day before meals  insulin lispro (ADMELOG) corrective regimen sliding scale   SubCutaneous at bedtime  insulin lispro Injectable (ADMELOG) 8 Unit(s) SubCutaneous three times a day before meals  milrinone Infusion 0.125 MICROgram(s)/kG/Min (2.63 mL/Hr) IV Continuous <Continuous>  pantoprazole    Tablet 40 milliGRAM(s) Oral before breakfast  senna 2 Tablet(s) Oral at bedtime  tamsulosin 0.4 milliGRAM(s) Oral at bedtime    MEDICATIONS  (PRN):  benzocaine/menthol Lozenge 1 Lozenge Oral every 2 hours PRN Sore Throat  polyethylene glycol 3350 17 Gram(s) Oral daily PRN Constipation      PHYSICAL EXAM:  GENERAL: AAOx4 NAD  HEAD:  Atraumatic, Normocephalic  EYES: EOMI, PERRLA, conjunctiva and sclera clear  NECK: Supple, No JVD, Normal thyroid, no enlarged nodes  NERVOUS SYSTEM: Strength 5/5 throughout intact finger to nose  CHEST/LUNG: B/L good air entry; No rales, rhonchi, or wheezing  HEART: S1S2 normal, no S3, Regular rate and rhythm; No murmurs  ABDOMEN: Soft, Nontender, Nondistended; Bowel sounds present  EXTREMITIES:  2+ Peripheral Pulses, No clubbing, cyanosis, or edema  LYMPH: No lymphadenopathy noted  SKIN: No rashes or lesions    LABS:                        11.5   8.40  )-----------( 210      ( 29 Oct 2023 00:55 )             36.3     10-29    139  |  104  |  42<H>  ----------------------------<  216<H>  4.1   |  22  |  1.79<H>    Ca    8.8      29 Oct 2023 05:42  Phos  3.7     10-29  Mg     1.9     10-29    TPro  5.9<L>  /  Alb  2.9<L>  /  TBili  0.5  /  DBili  x   /  AST  13  /  ALT  25  /  AlkPhos  59  10-29    LIVER FUNCTIONS - ( 29 Oct 2023 05:42 )  Alb: 2.9 g/dL / Pro: 5.9 g/dL / ALK PHOS: 59 U/L / ALT: 25 U/L / AST: 13 U/L / GGT: x           PT/INR - ( 29 Oct 2023 00:03 )   PT: 12.9 sec;   INR: 1.24 ratio         PTT - ( 29 Oct 2023 00:03 )  PTT:56.0 sec  CAPILLARY BLOOD GLUCOSE      POCT Blood Glucose.: 200 mg/dL (28 Oct 2023 21:15)  POCT Blood Glucose.: 324 mg/dL (28 Oct 2023 17:00)  POCT Blood Glucose.: 162 mg/dL (28 Oct 2023 11:53)            Urinalysis Basic - ( 29 Oct 2023 05:42 )    Color: x / Appearance: x / SG: x / pH: x  Gluc: 216 mg/dL / Ketone: x  / Bili: x / Urobili: x   Blood: x / Protein: x / Nitrite: x   Leuk Esterase: x / RBC: x / WBC x   Sq Epi: x / Non Sq Epi: x / Bacteria: x          RADIOLOGY & ADDITIONAL TESTS:  CXR:        Care Discussed with Consultants/Other Providers [ x] YES  [ ] NO           Patient is a 58y old  Male who presents with a chief complaint of NSTEMI (28 Oct 2023 19:24)    HPI:  Patient with separate chart from [facility], MRN# [id number]    57 yo Male pmhx CVA with mild left sided deficits, HTN, DM2, vertigo, HLD, Mobitz II s/pp Medtronic dual chamber ICD (22) initially presented to [facility] from home with complaints of dyspnea and chest pain and was admitted w/ acute hypoxic respiratory failure likely due to acute pulmonary edema due to acute HFrEF in setting of acute NSTEMI, LAURA and enterovirus infection. Pt with brief MICU stay at [facility] requiring bipap (no intubation), was treated for PNA with cefepime, and was found to have TTE done 23 showing EF 20% with severely decreased LVSF, multiple regional wall motion abnormalities, and elevated LV end diastolic pressure. Pt was transferred to [facility] for cardiac cath evaluation. Patient without any acute complaints, complaining of intermittent palpitations for the last 2 days. No chest pain, SOB, fevers, nausea, vomiting, abdominal pain.  (13 Oct 2023 21:05)       INTERVAL HPI/OVERNIGHT EVENTS:   No overnight events. NPO for possible central line placement in cath lab. Afebrile, hemodynamically stable, persistently tachycardic.    Patient seen and examined at bedside. Pt stated he is feeling well. A&Ox2-3 with a lot of prompting (knows name, thought at [facility], we are in end of october). Thought it was nighttime. No acute complaints, denies cp/sob/dizziness/palpitations.    ICU Vital Signs Last 24 Hrs  T(C): 36.9 (30 Oct 2023 08:00), Max: 36.9 (29 Oct 2023 14:10)  T(F): 98.5 (30 Oct 2023 08:00), Max: 98.5 (30 Oct 2023 08:00)  HR: 127 (30 Oct 2023 08:00) (126 - 144)  BP: 109/68 (30 Oct 2023 08:00) (72/48 - 109/68)  BP(mean): 85 (30 Oct 2023 08:00) (56 - 85)  ABP: 104/60 (30 Oct 2023 08:00) (81/41 - 120/66)  ABP(mean): 73 (30 Oct 2023 08:00) (51 - 80)  RR: 20 (30 Oct 2023 08:00) (11 - 36)  SpO2: 96% (30 Oct 2023 08:) (88% - 97%)    O2 Parameters below as of 30 Oct 2023 08:00  Patient On (Oxygen Delivery Method): nasal cannula  O2 Flow (L/min): 2      I&O's Summary    29 Oct 2023 07:01  -  30 Oct 2023 07:00  --------------------------------------------------------  IN: 1043.2 mL / OUT: 820 mL / NET: 223.2 mL    30 Oct 2023 07:01  -  30 Oct 2023 09:04  --------------------------------------------------------  IN: 16.3 mL / OUT: 0 mL / NET: 16.3 mL        Daily     Daily Weight in k.5 (28 Oct 2023 12:15)    Adult Advanced Hemodynamics Last 24 Hrs  CVP(mm Hg): --  CVP(cm H2O): --  CO: --  CI: --  PA: --  PA(mean): --  PCWP: --  SVR: --  SVRI: --  PVR: --  PVRI: --    EKG/Telemetry Events:    MEDICATIONS  (STANDING):  aspirin enteric coated 81 milliGRAM(s) Oral daily  atorvastatin 80 milliGRAM(s) Oral at bedtime  buMETAnide 2 milliGRAM(s) Oral daily  chlorhexidine 2% Cloths 1 Application(s) Topical daily  dextrose 5%. 1000 milliLiter(s) (50 mL/Hr) IV Continuous <Continuous>  dextrose 5%. 1000 milliLiter(s) (100 mL/Hr) IV Continuous <Continuous>  digoxin     Tablet 125 MICROGram(s) Oral daily  heparin  Infusion 1200 Unit(s)/Hr (12 mL/Hr) IV Continuous <Continuous>  insulin glargine Injectable (LANTUS) 24 Unit(s) SubCutaneous at bedtime  insulin lispro (ADMELOG) corrective regimen sliding scale   SubCutaneous three times a day before meals  insulin lispro (ADMELOG) corrective regimen sliding scale   SubCutaneous at bedtime  insulin lispro Injectable (ADMELOG) 8 Unit(s) SubCutaneous three times a day before meals  milrinone Infusion 0.125 MICROgram(s)/kG/Min (2.63 mL/Hr) IV Continuous <Continuous>  pantoprazole    Tablet 40 milliGRAM(s) Oral before breakfast  senna 2 Tablet(s) Oral at bedtime  tamsulosin 0.4 milliGRAM(s) Oral at bedtime    MEDICATIONS  (PRN):  benzocaine/menthol Lozenge 1 Lozenge Oral every 2 hours PRN Sore Throat  polyethylene glycol 3350 17 Gram(s) Oral daily PRN Constipation      PHYSICAL EXAM:  GENERAL: AAOx4 NAD  HEAD:  Atraumatic, Normocephalic  EYES: EOMI, PERRLA, conjunctiva and sclera clear  NECK: Supple, No JVD, Normal thyroid, no enlarged nodes  NERVOUS SYSTEM: Strength 5/5 throughout intact finger to nose  CHEST/LUNG: B/L good air entry; No rales, rhonchi, or wheezing  HEART: S1S2 normal, no S3, Regular rate and rhythm; No murmurs  ABDOMEN: Soft, Nontender, Nondistended; Bowel sounds present  EXTREMITIES:  2+ Peripheral Pulses, No clubbing, cyanosis, or edema  LYMPH: No lymphadenopathy noted  SKIN: No rashes or lesions    LABS:             CBC Full  -  ( 30 Oct 2023 05:08 )  WBC Count : 8.11 K/uL  RBC Count : 3.91 M/uL  Hemoglobin : 10.0 g/dL  Hematocrit : 31.4 %  Platelet Count - Automated : 211 K/uL  Mean Cell Volume : 80.3 fl  Mean Cell Hemoglobin : 25.6 pg  Mean Cell Hemoglobin Concentration : 31.8 gm/dL  Auto Neutrophil # : x  Auto Lymphocyte # : x  Auto Monocyte # : x  Auto Eosinophil # : x  Auto Basophil # : x  Auto Neutrophil % : x  Auto Lymphocyte % : x  Auto Monocyte % : x  Auto Eosinophil % : x  Auto Basophil % : x    10-30    138  |  105  |  44<H>  ----------------------------<  218<H>  3.9   |  22  |  1.87<H>    Ca    8.3<L>      30 Oct 2023 05:07  Phos  4.4     10-30  Mg     2.2     10-30    TPro  5.7<L>  /  Alb  2.9<L>  /  TBili  0.3  /  DBili  x   /  AST  13  /  ALT  21  /  AlkPhos  55  10-30       PTT - ( 29 Oct 2023 00:03 )  PTT:56.0 sec  CAPILLARY BLOOD GLUCOSE      POCT Blood Glucose.: 200 mg/dL (28 Oct 2023 21:15)  POCT Blood Glucose.: 324 mg/dL (28 Oct 2023 17:00)  POCT Blood Glucose.: 162 mg/dL (28 Oct 2023 11:53)            Urinalysis Basic - ( 29 Oct 2023 05:42 )    Color: x / Appearance: x / SG: x / pH: x  Gluc: 216 mg/dL / Ketone: x  / Bili: x / Urobili: x   Blood: x / Protein: x / Nitrite: x   Leuk Esterase: x / RBC: x / WBC x   Sq Epi: x / Non Sq Epi: x / Bacteria: x          RADIOLOGY & ADDITIONAL TESTS:  CXR:        Care Discussed with Consultants/Other Providers [ x] YES  [ ] NO           Patient is a 58y old  Male who presents with a chief complaint of NSTEMI (28 Oct 2023 19:24)    HPI:  Patient with separate chart from [facility], MRN# [id number]    57 yo Male pmhx CVA with mild left sided deficits, HTN, DM2, vertigo, HLD, Mobitz II s/pp Medtronic dual chamber ICD (22) initially presented to [facility] from home with complaints of dyspnea and chest pain and was admitted w/ acute hypoxic respiratory failure likely due to acute pulmonary edema due to acute HFrEF in setting of acute NSTEMI, LAURA and enterovirus infection. Pt with brief MICU stay at [facility] requiring bipap (no intubation), was treated for PNA with cefepime, and was found to have TTE done 23 showing EF 20% with severely decreased LVSF, multiple regional wall motion abnormalities, and elevated LV end diastolic pressure. Pt was transferred to [facility] for cardiac cath evaluation. Patient without any acute complaints, complaining of intermittent palpitations for the last 2 days. No chest pain, SOB, fevers, nausea, vomiting, abdominal pain.  (13 Oct 2023 21:05)       INTERVAL HPI/OVERNIGHT EVENTS:   No overnight events. Pending possible central line placement in cath lab. Afebrile, hemodynamically stable, persistently tachycardic.    Patient seen and examined at bedside. Pt stated he is feeling well. A&Ox2-3 with a lot of prompting (knows name, thought at [facility], we are in end of october). Thought it was nighttime. No acute complaints, denies cp/sob/dizziness/palpitations.    ICU Vital Signs Last 24 Hrs  T(C): 36.9 (30 Oct 2023 08:00), Max: 36.9 (29 Oct 2023 14:10)  T(F): 98.5 (30 Oct 2023 08:00), Max: 98.5 (30 Oct 2023 08:00)  HR: 127 (30 Oct 2023 08:00) (126 - 144)  BP: 109/68 (30 Oct 2023 08:00) (72/48 - 109/68)  BP(mean): 85 (30 Oct 2023 08:00) (56 - 85)  ABP: 104/60 (30 Oct 2023 08:00) (81/41 - 120/66)  ABP(mean): 73 (30 Oct 2023 08:00) (51 - 80)  RR: 20 (30 Oct 2023 08:00) (11 - 36)  SpO2: 96% (30 Oct 2023 08:00) (88% - 97%)    O2 Parameters below as of 30 Oct 2023 08:00  Patient On (Oxygen Delivery Method): nasal cannula  O2 Flow (L/min): 2      I&O's Summary    29 Oct 2023 07:01  -  30 Oct 2023 07:00  --------------------------------------------------------  IN: 1043.2 mL / OUT: 820 mL / NET: 223.2 mL    30 Oct 2023 07:01  -  30 Oct 2023 09:04  --------------------------------------------------------  IN: 16.3 mL / OUT: 0 mL / NET: 16.3 mL        Daily     Daily Weight in k.5 (28 Oct 2023 12:15)      EKG/Telemetry Events:    MEDICATIONS  (STANDING):  aspirin enteric coated 81 milliGRAM(s) Oral daily  atorvastatin 80 milliGRAM(s) Oral at bedtime  buMETAnide 2 milliGRAM(s) Oral daily  chlorhexidine 2% Cloths 1 Application(s) Topical daily  dextrose 5%. 1000 milliLiter(s) (50 mL/Hr) IV Continuous <Continuous>  dextrose 5%. 1000 milliLiter(s) (100 mL/Hr) IV Continuous <Continuous>  digoxin     Tablet 125 MICROGram(s) Oral daily  heparin  Infusion 1200 Unit(s)/Hr (12 mL/Hr) IV Continuous <Continuous>  insulin glargine Injectable (LANTUS) 24 Unit(s) SubCutaneous at bedtime  insulin lispro (ADMELOG) corrective regimen sliding scale   SubCutaneous three times a day before meals  insulin lispro (ADMELOG) corrective regimen sliding scale   SubCutaneous at bedtime  insulin lispro Injectable (ADMELOG) 8 Unit(s) SubCutaneous three times a day before meals  milrinone Infusion 0.125 MICROgram(s)/kG/Min (2.63 mL/Hr) IV Continuous <Continuous>  pantoprazole    Tablet 40 milliGRAM(s) Oral before breakfast  senna 2 Tablet(s) Oral at bedtime  tamsulosin 0.4 milliGRAM(s) Oral at bedtime    MEDICATIONS  (PRN):  benzocaine/menthol Lozenge 1 Lozenge Oral every 2 hours PRN Sore Throat  polyethylene glycol 3350 17 Gram(s) Oral daily PRN Constipation      PHYSICAL EXAM:  GENERAL: AAOx4 NAD  HEAD:  Atraumatic, Normocephalic  EYES: EOMI, PERRLA, conjunctiva and sclera clear  NECK: Supple, No JVD, Normal thyroid, no enlarged nodes  NERVOUS SYSTEM: Strength 5/5 throughout intact finger to nose  CHEST/LUNG: B/L good air entry; No rales, rhonchi, or wheezing  HEART: S1S2 normal, no S3, Regular rate and rhythm; No murmurs  ABDOMEN: Soft, Nontender, Nondistended; Bowel sounds present  EXTREMITIES:  2+ Peripheral Pulses, No clubbing, cyanosis, or edema  LYMPH: No lymphadenopathy noted  SKIN: No rashes or lesions    LABS:             CBC Full  -  ( 30 Oct 2023 05:08 )  WBC Count : 8.11 K/uL  RBC Count : 3.91 M/uL  Hemoglobin : 10.0 g/dL  Hematocrit : 31.4 %  Platelet Count - Automated : 211 K/uL  Mean Cell Volume : 80.3 fl  Mean Cell Hemoglobin : 25.6 pg  Mean Cell Hemoglobin Concentration : 31.8 gm/dL  Auto Neutrophil # : x  Auto Lymphocyte # : x  Auto Monocyte # : x  Auto Eosinophil # : x  Auto Basophil # : x  Auto Neutrophil % : x  Auto Lymphocyte % : x  Auto Monocyte % : x  Auto Eosinophil % : x  Auto Basophil % : x    10-30    138  |  105  |  44<H>  ----------------------------<  218<H>  3.9   |  22  |  1.87<H>    Ca    8.3<L>      30 Oct 2023 05:07  Phos  4.4     10-30  Mg     2.2     10-30    TPro  5.7<L>  /  Alb  2.9<L>  /  TBili  0.3  /  DBili  x   /  AST  13  /  ALT  21  /  AlkPhos  55  10-30       PTT - ( 29 Oct 2023 00:03 )  PTT:56.0 sec  CAPILLARY BLOOD GLUCOSE      POCT Blood Glucose.: 200 mg/dL (28 Oct 2023 21:15)  POCT Blood Glucose.: 324 mg/dL (28 Oct 2023 17:00)  POCT Blood Glucose.: 162 mg/dL (28 Oct 2023 11:53)            Urinalysis Basic - ( 29 Oct 2023 05:42 )    Color: x / Appearance: x / SG: x / pH: x  Gluc: 216 mg/dL / Ketone: x  / Bili: x / Urobili: x   Blood: x / Protein: x / Nitrite: x   Leuk Esterase: x / RBC: x / WBC x   Sq Epi: x / Non Sq Epi: x / Bacteria: x          RADIOLOGY & ADDITIONAL TESTS:  CXR:    < from: Xray Chest 2 Views PA/Lat (10.30.23 @ 09:42) >  INTERPRETATION:  EXAM: XR CHEST PA AND LATERAL    INDICATION: Admitting Dxs: R07.9 CHEST PAIN, UNSPECIFIED ICD Check   Pre-MRI MXR    COMPARISON:  at 8:32 AM    IMPRESSION: Limited exam as noted by the technologist. Increased   perihilar interstitial markings left greater than right. Low left greater   than right effusion with some compressive atelectatic change. Borderline   cardiomegaly. Pacer. Regional osseous structures appropriate for age.    --- End of Report ---      < end of copied text >        Care Discussed with Consultants/Other Providers [ x] YES  [ ] NO

## 2023-10-30 NOTE — PROGRESS NOTE ADULT - SUBJECTIVE AND OBJECTIVE BOX
BRANDEN MARRUFO  MRN-[id number]  Patient is a 58y old  Male who presents with a chief complaint of NSTEMI (30 Oct 2023 13:43)    HPI:  Patient with separate chart from [facility], MRN# [id number]    57 yo Male pmhx CVA with mild left sided deficits, HTN, DM2, vertigo, HLD, Mobitz II s/pp [employer] dual chamber ICD (12/21/22) initially presented to [facility] from home with complaints of dyspnea and chest pain and was admitted w/ acute hypoxic respiratory failure likely due to acute pulmonary edema due to acute HFrEF in setting of acute NSTEMI, LAURA and enterovirus infection. Pt with brief MICU stay at [facility] requiring bipap (no intubation), was treated for PNA with cefepime, and was found to have TTE done 9/26/23 showing EF 20% with severely decreased LVSF, multiple regional wall motion abnormalities, and elevated LV end diastolic pressure. Pt was transferred to [facility] for cardiac cath evaluation. Patient without any acute complaints, complaining of intermittent palpitations for the last 2 days. No chest pain, SOB, fevers, nausea, vomiting, abdominal pain.  (13 Oct 2023 21:05)    24 Hours: Pt remains confused, s/p CTH to verify head MRI that suggests no change.    REVIEW OF SYSTEMS:    CONSTITUTIONAL: No weakness, fevers or chills  EYES/ENT: No visual changes;  No vertigo or throat pain   NECK: No pain or stiffness  RESPIRATORY: No cough, wheezing, hemoptysis; No shortness of breath  CARDIOVASCULAR: No chest pain or palpitations  GASTROINTESTINAL: No abdominal or epigastric pain. No nausea, vomiting, or hematemesis; No diarrhea or constipation. No melena or hematochezia.  GENITOURINARY: No dysuria, frequency or hematuria  NEUROLOGICAL: No numbness or weakness  SKIN: No itching, rashes      Physical Exam:  Vital Signs Last 24 Hrs  T(C): 36.8 (30 Oct 2023 20:00), Max: 36.9 (30 Oct 2023 08:00)  T(F): 98.3 (30 Oct 2023 20:00), Max: 98.5 (30 Oct 2023 08:00)  HR: 123 (30 Oct 2023 22:00) (120 - 137)  BP: 99/56 (30 Oct 2023 21:00) (92/60 - 109/68)  BP(mean): 70 (30 Oct 2023 21:00) (70 - 85)  RR: 32 (30 Oct 2023 22:00) (14 - 41)  SpO2: 89% (30 Oct 2023 22:00) (88% - 99%)    Parameters below as of 30 Oct 2023 20:00  Patient On (Oxygen Delivery Method): room air      ============================I/O===========================   I&O's Detail    29 Oct 2023 07:01  -  30 Oct 2023 07:00  --------------------------------------------------------  IN:    Heparin: 286 mL    Milrinone: 90.1 mL    Milrinone: 37.1 mL    Oral Fluid: 630 mL  Total IN: 1043.2 mL    OUT:    Voided (mL): 820 mL  Total OUT: 820 mL    Total NET: 223.2 mL      30 Oct 2023 07:01  -  30 Oct 2023 23:20  --------------------------------------------------------  IN:    Heparin: 154 mL    Milrinone: 74.2 mL    Oral Fluid: 240 mL  Total IN: 468.2 mL    OUT:    Voided (mL): 800 mL  Total OUT: 800 mL    Total NET: -331.8 mL        ============================ LABS =========================                        10.0   8.11  )-----------( 211      ( 30 Oct 2023 05:08 )             31.4     10-30    138  |  105  |  44<H>  ----------------------------<  218<H>  3.9   |  22  |  1.87<H>    Ca    8.3<L>      30 Oct 2023 05:07  Phos  4.4     10-30  Mg     2.2     10-30    TPro  5.7<L>  /  Alb  2.9<L>  /  TBili  0.3  /  DBili  x   /  AST  13  /  ALT  21  /  AlkPhos  55  10-30    LIVER FUNCTIONS - ( 30 Oct 2023 05:07 )  Alb: 2.9 g/dL / Pro: 5.7 g/dL / ALK PHOS: 55 U/L / ALT: 21 U/L / AST: 13 U/L / GGT: x           PT/INR - ( 30 Oct 2023 05:08 )   PT: 12.9 sec;   INR: 1.18 ratio         PTT - ( 30 Oct 2023 18:33 )  PTT:60.5 sec  ABG - ( 30 Oct 2023 13:32 )  pH, Arterial: 7.39  pH, Blood: x     /  pCO2: 41    /  pO2: 94    / HCO3: 25    / Base Excess: -0.2  /  SaO2: 97.4              Urinalysis Basic - ( 30 Oct 2023 05:07 )    Color: x / Appearance: x / SG: x / pH: x  Gluc: 218 mg/dL / Ketone: x  / Bili: x / Urobili: x   Blood: x / Protein: x / Nitrite: x   Leuk Esterase: x / RBC: x / WBC x   Sq Epi: x / Non Sq Epi: x / Bacteria: x      ======================Micro/Rad/Cardio=================  Culture: Reviewed   CXR: Reviewed  Echo:Reviewed  ======================================================  PAST MEDICAL & SURGICAL HISTORY:  CVA (cerebrovascular accident)      T2DM (type 2 diabetes mellitus)      HTN (hypertension)      HLD (hyperlipidemia)      S/P cystoscopy      S/P hernia repair        ====================ASSESSMENT ==============  57 yo Male pmhx CVA with mild left sided deficits, HTN, DM2, vertigo, HLD, Mobitz II s/pp Medtronic dual chamber ICD (12/21/22) initially presented to OSH for SOB c/f ADHF vs. PNA, later found to have NSTEMI transferred to [facility] for C evaluation. 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 Admitted to CICU for hypotension, inotropic support, hemodynamic monitoring. Previously downgraded on 10/22, RRT called while on the floor for AMS and hypotensive to 80/40, CTH 10/27 found possible R cerebellar infarct, patient re-transferred to CICU for inotropic support.    NEUROLOGICAL  #Possible R cerebellar infarct  #Encephalopathy   #Hx of CVA w/ mild L residual deficit  - currently A&Ox3  - CT head 10/27 w/ atrophy and small vessel white matter ischemic changes. R medial cerebellar infarct may be acute vs subacute  - MRI today suggestive of evolving acute/subacute right-sided PICA distribution infarction with associated cytotoxic edema and very mild hemorrhagic transformation. and a suspicious mass.   - CTH repeated 10/30 and determined to show no interval change  - if infarct not acute, consider cardiogenic shock as source of altered mental status-> CT surg consult for CABG  - neurology following  - B12, TSH, RPR wnl  - neuro checks    CARDIOVASCULAR  #Hypotension/cardiogenic shock, iso acute on chronic HFrEF  TTE 10/16: EF 20%, severely reduced LVSF  LHC 10/16: 95% pLAD, 80% mid and distal LCx, 90% mid and distal RCA  RHC 10/19: RA 10, PCWP 20, CO 4.4, CI 2.3  cMR 10/18: limited viability in LAD territory  TTE 10/26: EF <20 %, LV apical thrombus is seen. Small pericardial effusion noted adjacent to the right ventricle with no evidence of hemodynamic compromise.  - RRT called for 80/40 while on the floor, milrinone 0.125 restarted on 10/28  - trend lactate, proBNP  - c/w bumex 2 mg PO qd  - presently net positive, consider further bumex as BP allows  - pending HF recs regarding high risk PCI vs further intervention; EP recommended no indication for upgrade at this time. HF holding off on launching VAD/transplant eval  - hold Entresto i/s/o hypotension  - off hydral/nitrates  - s/p dig load, dig level 1.4, acceptable per HF   - f/u device interrogation  - avoid BB/CCB given borderline cardiac function  - start GDMT when appropriate    #LV apical thrombus  -c/w heparin gtt  -MRI brain and MRA H/N when stable  -neuro checks  -not a candidate for impella assisted PCI    #Tachycardia  Mobitz II s/p AICD  - Sinus tach vs. atach vs. aflutter. Iso increased cardiac demand/ hypoxia vs. iso infxn?  - per EP, appears sinus tach, no indication for cardioversion/EMILEE  - s/p dig load, dig level 1.4, acceptable per HF   - per EP, no need to upgrade device at this time    #HLD  - c/w Atorv 80mg     RESPIRATORY  #Pulmonary edema  Likely iso CHF vs. superimposed PNA  CT chest 10/28: scattered patchy and nodular bilateral upper lobe predominant groundglass opacities with interlobular septal thickening representing pulmonary edema  -c/w Bumex 2 qd   -infectious cause not excluded, low threshold to add abx if leukocytosis or febrile, as pt persistently tachy  -f/u noncontrast CT chest in 1-3 month to ensure resolution    GI/NUTRITION  No active issues  - CC DASH Halal diet  - GI ppx: PPI 40mg daily  - c/w Bowel regimen    /RENAL  #LAURA:   - sCr on admission 1.28, uptrending likely iso low flow state  -Trend sCr  -Monitor I/O  -c/w diuresis    #Elevated lactate - Improving  - uptrending lactate likely iso cardiogenic shock  - now at normal range  - c/w Milrinone, wean as tolerated  - continue to trend    #BPH  - c/w Flomax 0.4mg qhs    SKIN  - no active issues    INFECTIOUS DISEASE  - No active issues  - plan for pulmonary edema as above  - Recent admission to OSH for ?PNA s/p 14 days of cefepime (last dose 10/14)  - Currently no signs of infection, WBC normal, afebrile, although monitor iso persistent tachycardia    ENDOCRINE    #DM2  - A1c 10/14 8.4%  - c/w Lantus 24u qhs + lispro 8u premeal + ISS  - trend FS, will likely need adjustment    #Thyroid nodule  CT chest: 3. 3 x 1.8 cm left supraclavicular nodule likely arising from the thyroid gland   -thyroid US outpatient    HEMATOLOGIC/DVT PPX  - No active issues  - DVT ppx: heparin gtt    #ETHICS  Full code    Patient requires continuous monitoring with bedside rhythm monitoring, arterial line, pulse oximetry, ventilator monitoring and intermittent blood gas analysis.  Care plan discussed with ICU care team.  Patient remained critical; required more than usual post op care; I have spent 35 minutes providing non-routine post op care, revaluated multiple times during the day.

## 2023-10-30 NOTE — PROGRESS NOTE ADULT - ASSESSMENT
59 YO M with a history of CVA with residual mild R paresthesias, second degree Mobitz II heart block s/p DC-ICD 12/2022 (initial concern for sarcoid but negative biopsy/PET post procedure), DM2 (A1c 8.4%), and HTN who was admitted to [facility] with chest pain and dyspnea, found to have NSTEMI with markedly elevated troponins and new severe LV dysfunction with regional wall motion abnormalities, admitted to CICU for low output state on milrinone, now with a. tach.  CT head 10/27 found possible R cerebellar infarct, patient re-transferred to CICU for inotropic support.    1.  Atrial tachycardia/ Sinus Tachycardia   2. possible R cerebellar infarct, encephalopathy   3. Hx of CVA w/ mild L residual deficit  4. TTE 10/26: EF <20 %, LV apical thrombus is seen, Small pericardial effusion noted adjacent to the right ventricle with no evidence of hemodynamic compromise.  5. Pulmonary Edema     - EMILEE/cardioversion cancelled for today while on Milrinone   - confirmed with [employer]  that patient is in 1:1 tachycardia likely Sinus Tachycardia   - follow up brain MRI today   - Awaits CT surgery input re: severe TVD    EMERALD Barboza [facility]  [phone]

## 2023-10-30 NOTE — PROGRESS NOTE ADULT - ASSESSMENT
====================ASSESSMENT ==============  57 yo Male pmhx CVA with mild left sided deficits, HTN, DM2, vertigo, HLD, Mobitz II s/pp Med[employer]onic dual chamber ICD (12/21/22) initially presented to OSH for SOB c/f ADHF vs. PNA, later found to have NSTEMI transferred to [facility] for C evaluation. 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 Admitted to CICU for hypotension, inotropic support, hemodynamic monitoring. Previously downgraded on 10/22, RRT called while on the floor for AMS and hypotensive to 80/40, CTH 10/27 found possible R cerebellar infarct, patient re-transferred to CICU for inotropic support.    NEUROLOGICAL  #Possible R cerebellar infarct  #Encephalopathy   #Hx of CVA w/ mild L residual deficit  - currently A&Ox3  - CT head 10/27 w/ atrophy and small vessel white matter ischemic changes. R medial cerebellar infarct may be acute vs subacute  - pending MRI tomorrow 10/29 5PM  - neurology following  - B12, TSH, RPR wnl  - neuro checks    CARDIOVASCULAR  #Hypotension/cardiogenic shock, iso acute on chronic HFrEF  TTE 10/16: EF 20%, severely reduced LVSF  [facility] 10/16: 95% pLAD, 80% mid and distal LCx, 90% mid and distal RCA  RHC 10/19: RA 10, PCWP 20, CO 4.4, CI 2.3  cMR 10/18: limited viability in LAD territory  TTE 10/26: EF <20 %, LV apical thrombus is seen. Small pericardial effusion noted adjacent to the right ventricle with no evidence of hemodynamic compromise.  - RRT called for 80/40 while on the floor, milrinone 0.125 restarted on 10/28  - trend lactate, proBNP  - c/w bumex 2 mg PO qd, for UO goal net neg 2-3L  - presently net positive, consider further bumex as BP allows  - pending HF recs regarding high risk PCI vs further intervention; EP recommended no indication for upgrade at this time. HF holding off on launching VAD/transplant eval  - hold Entresto i/s/o hypotension  - off hydral/nitrates  - s/p dig load, dig level 19 (10/29), will hold off further dose and recheck  - avoid BB/CCB given borderline cardiac function  - start GDMT when appropriate  - Pending swan placement, previous unsuccessful attempt RIJ with visible thrombus on US & LIJ small    #LV apical thrombus  -c/w heparin gtt  -MRI brain and MRA H/N when stable  -neuro checks    #Tachycardia  Mobitz II s/p AICD  - Likely reflex tachycardiac iso increased cardiac demand/ hypoxia vs. iso infxn  - s/p dig load, dig level 19 (10/29), will hold off further dose and recheck  - per EP, no need to upgrade device at this time    #HLD  - c/w Atorv 80mg     RESPIRATORY  #Pulmonary edema  Likely iso CHF vs. superimposed PNA  CT chest 10/28: scattered patchy and nodular bilateral upper lobe predominant groundglass opacities with interlobular septal thickening representing pulmonary edema  -c/w Bumex 2 qd   -infectious cause not excluded, low threshold to add abx if leukocytosis or febrile, as pt persistently tachy  -f/u noncontrast CT chest in 1-3 month to ensure resolution    GI/NUTRITION  No active issues  - CC DASH Halal diet  - GI ppx: PPI 40mg daily  - c/w Bowel regimen    /RENAL  #LAURA:   - sCr on admission 1.28, uptrending likely iso low flow state  -Trend sCr  -Monitor I/O  -c/w diuresis    #Elevated lactate - Improving  - uptrending lactate noted likely iso cardiogenic shock  - c/w Milrinone, wean as tolerated  - continue to trend    #BPH  - c/w Flomax 0.4mg qhs    SKIN  - no active issues    INFECTIOUS DISEASE  - No active issues  - plan for pulmonary edema as above  - Recent admission to OSH for ?PNA s/p 14 days of cefepime (last dose 10/14)  - Currently no signs of infection, WBC normal, afebrile, although monitor iso persistent tachycardia    ENDOCRINE    #DM2  - A1c 10/14 8.4%  - c/w Lantus 24u qhs + lispro 8u premeal + ISS  - trend FS, will likely need adjustment    #Thyroid nodule  CT chest: 3. 3 x 1.8 cm left supraclavicular nodule likely arising from the thyroid gland   -thyroid US outpatient    HEMATOLOGIC/DVT PPX  - No active issues  - DVT ppx: heparin gtt    #ETHICS  Full code ====================ASSESSMENT ==============  59 yo Male pmhx CVA with mild left sided deficits, HTN, DM2, vertigo, HLD, Mobitz II s/pp Med[employer] dual chamber ICD (12/21/22) initially presented to OSH for SOB c/f ADHF vs. PNA, later found to have NSTEMI transferred to [facility] for C evaluation. 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 Admitted to CICU for hypotension, inotropic support, hemodynamic monitoring. Previously downgraded on 10/22, RRT called while on the floor for AMS and hypotensive to 80/40, CTH 10/27 found possible R cerebellar infarct, patient re-transferred to CICU for inotropic support.    NEUROLOGICAL  #Possible R cerebellar infarct  #Encephalopathy   #Hx of CVA w/ mild L residual deficit  - currently A&Ox3  - CT head 10/27 w/ atrophy and small vessel white matter ischemic changes. R medial cerebellar infarct may be acute vs subacute  - pending MRI today 3 pm  - if infarct not acute, consider cardiogenic shock as source of altered mental status-> CT surg consult for CABG  - neurology following  - B12, TSH, RPR wnl  - neuro checks    CARDIOVASCULAR  #Hypotension/cardiogenic shock, iso acute on chronic HFrEF  TTE 10/16: EF 20%, severely reduced LVSF  [facility] 10/16: 95% pLAD, 80% mid and distal LCx, 90% mid and distal RCA  RHC 10/19: RA 10, PCWP 20, CO 4.4, CI 2.3  cMR 10/18: limited viability in LAD territory  TTE 10/26: EF <20 %, LV apical thrombus is seen. Small pericardial effusion noted adjacent to the right ventricle with no evidence of hemodynamic compromise.  - RRT called for 80/40 while on the floor, milrinone 0.125 restarted on 10/28  - trend lactate, proBNP  - c/w bumex 2 mg PO qd  - presently net positive, consider further bumex as BP allows  - pending HF recs regarding high risk PCI vs further intervention; EP recommended no indication for upgrade at this time. HF holding off on launching VAD/transplant eval  - hold Entresto i/s/o hypotension  - off hydral/nitrates  - s/p dig load, dig level 1.4, acceptable per HF   - f/u device interrogation  - avoid BB/CCB given borderline cardiac function  - start GDMT when appropriate  - Pending swan placement, previous unsuccessful attempt RIJ with visible thrombus on US & LIJ small    #LV apical thrombus  -c/w heparin gtt  -MRI brain and MRA H/N when stable  -neuro checks  -not a candidate for impella assisted PCI    #Tachycardia  Mobitz II s/p AICD  - Sinus tach vs. atach vs. aflutter. Iso increased cardiac demand/ hypoxia vs. iso infxn?  - per EP, appears sinus tach, no indication for cardioversion/EMILEE  - s/p dig load, dig level 1.4, acceptable per HF   - per EP, no need to upgrade device at this time    #HLD  - c/w Atorv 80mg     RESPIRATORY  #Pulmonary edema  Likely iso CHF vs. superimposed PNA  CT chest 10/28: scattered patchy and nodular bilateral upper lobe predominant groundglass opacities with interlobular septal thickening representing pulmonary edema  -c/w Bumex 2 qd   -infectious cause not excluded, low threshold to add abx if leukocytosis or febrile, as pt persistently tachy  -f/u noncontrast CT chest in 1-3 month to ensure resolution    GI/NUTRITION  No active issues  - CC DASH Halal diet  - GI ppx: PPI 40mg daily  - c/w Bowel regimen    /RENAL  #LAURA:   - sCr on admission 1.28, uptrending likely iso low flow state  -Trend sCr  -Monitor I/O  -c/w diuresis    #Elevated lactate - Improving  - uptrending lactate likely iso cardiogenic shock  - now at normal range  - c/w Milrinone, wean as tolerated  - continue to trend    #BPH  - c/w Flomax 0.4mg qhs    SKIN  - no active issues    INFECTIOUS DISEASE  - No active issues  - plan for pulmonary edema as above  - Recent admission to OSH for ?PNA s/p 14 days of cefepime (last dose 10/14)  - Currently no signs of infection, WBC normal, afebrile, although monitor iso persistent tachycardia    ENDOCRINE    #DM2  - A1c 10/14 8.4%  - c/w Lantus 24u qhs + lispro 8u premeal + ISS  - trend FS, will likely need adjustment    #Thyroid nodule  CT chest: 3. 3 x 1.8 cm left supraclavicular nodule likely arising from the thyroid gland   -thyroid US outpatient    HEMATOLOGIC/DVT PPX  - No active issues  - DVT ppx: heparin gtt    #ETHICS  Full code

## 2023-10-30 NOTE — PROGRESS NOTE ADULT - SUBJECTIVE AND OBJECTIVE BOX
ADVANCED HEART FAILURE & TRANSPLANT  - PROGRESS NOTE  *To reach the NS2 Team from 8am to 5pm, please call [phone]   ___________________________________________________________________________  Subjective:  - reports feeling "lousy"  - fatigued, hungry, sometimes lightheaded  - denies SOB, orthopnea, abdominal discomfort  - RIJ swan unable to be placed due to thrombus, formal UE ultrasound being done    Medications:  acetaminophen   IVPB .. 1000 milliGRAM(s) IV Intermittent once  aspirin enteric coated 81 milliGRAM(s) Oral daily  atorvastatin 80 milliGRAM(s) Oral at bedtime  benzocaine/menthol Lozenge 1 Lozenge Oral every 2 hours PRN  buMETAnide 2 milliGRAM(s) Oral daily  chlorhexidine 2% Cloths 1 Application(s) Topical daily  digoxin     Tablet 125 MICROGram(s) Oral daily  heparin  Infusion 1200 Unit(s)/Hr IV Continuous <Continuous>  insulin glargine Injectable (LANTUS) 26 Unit(s) SubCutaneous at bedtime  insulin lispro (ADMELOG) corrective regimen sliding scale   SubCutaneous three times a day before meals  insulin lispro (ADMELOG) corrective regimen sliding scale   SubCutaneous at bedtime  insulin lispro Injectable (ADMELOG) 10 Unit(s) SubCutaneous three times a day before meals  milrinone Infusion 0.25 MICROgram(s)/kG/Min IV Continuous <Continuous>  pantoprazole    Tablet 40 milliGRAM(s) Oral before breakfast  polyethylene glycol 3350 17 Gram(s) Oral daily PRN  senna 2 Tablet(s) Oral at bedtime  tamsulosin 0.4 milliGRAM(s) Oral at bedtime      Physical Exam:    Vitals:  Vital Signs Last 24 Hours  T(C): 36.3 (10-30-23 @ 12:00), Max: 36.9 (10-30-23 @ 08:00)  HR: 125 (10-30-23 @ 16:00) (124 - 144)  BP: 95/61 (10-30-23 @ 09:00) (95/61 - 109/68)  RR: 24 (10-30-23 @ 16:00) (14 - 36)  SpO2: 95% (10-30-23 @ 16:00) (88% - 99%)    Weight in k (10-30 @ 04:00)    I&O's Summary    29 Oct 2023 07:  -  30 Oct 2023 07:00  --------------------------------------------------------  IN: 1043.2 mL / OUT: 820 mL / NET: 223.2 mL    30 Oct 2023 07:  -  30 Oct 2023 18:04  --------------------------------------------------------  IN: 386.7 mL / OUT: 500 mL / NET: -113.3 mL    Tele: ST 120s    General: No distress. Comfortable.  HEENT: EOM intact.  Neck: Neck supple. JVP ~10cm H2O. No masses  Chest: Clear to auscultation bilaterally  CV: Tachycardic, Normal S1 and S2. No murmurs, rub, or gallops. Radial pulses normal. No LE edema.  Abdomen: Soft, non-distended, non-tender  Skin: No rashes or skin breakdown  Neurology: Alert and oriented times 1, poor insight, unaware of situation. Sensation intact  Psych: flat affect    Labs:                        10.0   8.11  )-----------( 211      ( 30 Oct 2023 05:08 )             31.4     10-30    138  |  105  |  44<H>  ----------------------------<  218<H>  3.9   |  22  |  1.87<H>    Ca    8.3<L>      30 Oct 2023 05:07  Phos  4.4     10-30  Mg     2.2     10-30    TPro  5.7<L>  /  Alb  2.9<L>  /  TBili  0.3  /  DBili  x   /  AST  13  /  ALT  21  /  AlkPhos  55  10-30    PT/INR - ( 30 Oct 2023 05:08 )   PT: 12.9 sec;   INR: 1.18 ratio      PTT - ( 30 Oct 2023 12:00 )  PTT:73.1 sec    Lactate, Blood: 1.2 mmol/L (10-30 @ 05:08)  Lactate, Blood: 1.9 mmol/L (10-29 @ 14:23)  Lactate, Blood: 1.8 mmol/L (10-29 @ 01:02)  Lactate, Blood: 1.7 mmol/L (10-28 @ 20:27)  Lactate, Blood: 3.3 mmol/L (10-28 @ 13:34)

## 2023-10-30 NOTE — PROGRESS NOTE ADULT - PROBLEM SELECTOR PLAN 1
#HFrEF  - Etiology: ICM  - Last TTE: EF <20% rMWA  - Cath: : 95% discrete proximal LAD disease and sequential multifocal disease with good distal target, 80-90% proximal LCx disease just prior to marginals, severe multifocal RCA disease with good targets   - NYHA: III  - ACC: C  - SCAI: C  - GDMT:    > BB: Holding iso compensatory tachycardia with concern for potential borderline/low output    > ACE/ARB/ARNI: Stopped Entresto 24-26 BID due to low flow and elevated Cr     > MRA: Stopped spironolactone 25 QDay due to low flow and elevated Cr     > SGLT2 I: Stopped Farxiga 10 QDay   - Inotropes: continue milrinone 0.25 mcg/kg/min  - Diuretics: Bumex 2 QDay   > [place] to be placed in cath lab given difficult access. F/U swan numbers to determine need for diuretics  - Advanced therapies: severe LV dysfunction with tachycardia and poor non-invasive hemodynamics concerning, currently holding off on launching VAD/transplant eval given cognitive issues however will continue to reassess. Of note he has good support and no clear psychosocial red flags. ABO AB. Appreciate neuro and behavioral psych input.   - F/U PT recs

## 2023-10-30 NOTE — PROGRESS NOTE ADULT - NS ATTEND AMEND GEN_ALL_CORE FT
Pt seen at bedside. Interrogation performed. 1:1 AV relationship. P wave morphology today consistent with prior p wave morphology when rates were slower. The patient is on Milrinone with a severely reduced EF. I suspect sinus tachycardia. There is not a great deal of variability on the graphic trends on Tele however the patient is not moving in bed. I would ambulate the patient if possible to determine the HR response to exercise. EP will follow.    MAIDA Foote

## 2023-10-30 NOTE — PROGRESS NOTE ADULT - SUBJECTIVE AND OBJECTIVE BOX
24H hour events: Resting comfortably in bed, falls asleep during assessment. No complaints at this time.     MEDICATIONS:  aspirin enteric coated 81 milliGRAM(s) Oral daily  buMETAnide 2 milliGRAM(s) Oral daily  digoxin     Tablet 125 MICROGram(s) Oral daily  heparin  Infusion 1200 Unit(s)/Hr IV Continuous <Continuous>  milrinone Infusion 0.25 MICROgram(s)/kG/Min IV Continuous <Continuous>    pantoprazole    Tablet 40 milliGRAM(s) Oral before breakfast  polyethylene glycol 3350 17 Gram(s) Oral daily PRN  senna 2 Tablet(s) Oral at bedtime    atorvastatin 80 milliGRAM(s) Oral at bedtime  insulin glargine Injectable (LANTUS) 26 Unit(s) SubCutaneous at bedtime  insulin lispro (ADMELOG) corrective regimen sliding scale   SubCutaneous three times a day before meals  insulin lispro (ADMELOG) corrective regimen sliding scale   SubCutaneous at bedtime  insulin lispro Injectable (ADMELOG) 10 Unit(s) SubCutaneous three times a day before meals    benzocaine/menthol Lozenge 1 Lozenge Oral every 2 hours PRN  chlorhexidine 2% Cloths 1 Application(s) Topical daily  tamsulosin 0.4 milliGRAM(s) Oral at bedtime      REVIEW OF SYSTEMS:  Complete 12point ROS negative.    PHYSICAL EXAM:  T(C): 36.3 (10-30-23 @ 12:00), Max: 36.9 (10-29-23 @ 14:10)  HR: 127 (10-30-23 @ 11:00) (125 - 144)  BP: 95/61 (10-30-23 @ 09:00) (95/61 - 109/68)  RR: 22 (10-30-23 @ 11:00) (17 - 36)  SpO2: 95% (10-30-23 @ 11:00) (88% - 97%)    29 Oct 2023 07:01  -  30 Oct 2023 07:00  --------------------------------------------------------  IN: 1043.2 mL / OUT: 820 mL / NET: 223.2 mL    30 Oct 2023 07:01  -  30 Oct 2023 12:15  --------------------------------------------------------  IN: 65.2 mL / OUT: 200 mL / NET: -134.8 mL      Appearance: Normal	  HEENT:   Normal oral mucosa, PERRL, EOMI	  Cardiovascular: Normal S1 S2, regular.  No JVD, No murmurs, No edema  Respiratory: Lungs diminished at bases.   Psychiatry: A & O x 3, Mood & affect appropriate  Gastrointestinal:  Soft, Non-tender, + BS	  Skin: No rashes, No ecchymoses, No cyanosis	  Extremities: Normal range of motion, No clubbing, cyanosis or edema  Vascular: Peripheral pulses palpable 2+ bilaterally      LABS:	 	    CBC Full  -  ( 30 Oct 2023 05:08 )  WBC Count : 8.11 K/uL  Hemoglobin : 10.0 g/dL  Hematocrit : 31.4 %  Platelet Count - Automated : 211 K/uL  Mean Cell Volume : 80.3 fl  Mean Cell Hemoglobin : 25.6 pg  Mean Cell Hemoglobin Concentration : 31.8 gm/dL  Auto Neutrophil # : x  Auto Lymphocyte # : x  Auto Monocyte # : x  Auto Eosinophil # : x  Auto Basophil # : x  Auto Neutrophil % : x  Auto Lymphocyte % : x  Auto Monocyte % : x  Auto Eosinophil % : x  Auto Basophil % : x    10-30    138  |  105  |  44<H>  ----------------------------<  218<H>  3.9   |  22  |  1.87<H>  10-29    136  |  104  |  43<H>  ----------------------------<  223<H>  4.2   |  21<L>  |  1.88<H>    Ca    8.3<L>      30 Oct 2023 05:07  Ca    8.4      29 Oct 2023 14:23  Phos  4.4     10-30  Phos  3.5     10-29  Mg     2.2     10-30  Mg     2.3     10-29    TPro  5.7<L>  /  Alb  2.9<L>  /  TBili  0.3  /  DBili  x   /  AST  13  /  ALT  21  /  AlkPhos  55  10-30  TPro  5.6<L>  /  Alb  2.7<L>  /  TBili  0.3  /  DBili  x   /  AST  16  /  ALT  24  /  AlkPhos  56  10-29      TELEMETRY: 	  Sinus Tachycardia 130's     TTE:   TRANSTHORACIC ECHOCARDIOGRAM REPORT  Pt. Name:       BRANDEN MARRUFO        Study Date:    10/26/2023  MRN:            [id number]        YOB: 1965  Accession #:    [id number]         Age:           58 years  Account#:       [id number]      Gender:        M  Heart Rate:     125 bpm           Height:        72.00 in (182.88 cm)  Rhythm:         sinus tachycardia Weight:        154.00 lb (69.85 kg)  Blood Pressure: 100/65 mmHg       BSA/BMI:       1.91 m² / 20.89 kg/m²  ________________________________________________________________________________________  Referring Physician:    [id number] Jaun Junior  Interpreting Physician: Puneet Landon M.D.  Primary Sonographer:    Armond Dobbs VALERIE    CPT:                ECHO TTE W CON FU LTD - .m;LIMITED SPECTRAL -                      69549.m;DEFINITY ECHO CONTRAST PER ML WASTED -                 .m;DEFINITY ECHO CONTRAST PER ML - .m  Indication(s):      Heart failure, unspecified - I50.9  Procedure:          Limited transthoracic echocardiogram.  Ordering Location:  [facility]  Contrast Injection: Verbal consent was obtained for injection of Ultrasonic                      Enhancing Agent following a discussion of risks and                      benefits.                      Endocardial visualization enhanced with 2 ml of Definity                      Ultrasound enhancing agent (Lot#:[id number] Exp.Date:12/01/2024                      Discarded Dose:8ml).  UEA Reaction:       Patient had no adverse reaction after injection of                      Ultrasound Enhancing Agent.  Study Information:  Image quality for this study is less than ideal.    _______________________________________________________________________________________     CONCLUSIONS:      1. Left ventricular systolic function is severely decreased with an ejection fraction visually estimated at <20 %.   2. Systolic function.   3. LV apical thrombus is seen.      Limited study.   4. Findings were discussed with Yenni LAFLEUR on 10/26/2023 at 1.29pm.   5. Small pericardial effusion noted adjacent to the right ventricle with no evidence of hemodynamic compromise.   6. There is a left ventricular thrombus.   7. There is normal LV mass and normal geometry.    ________________________________________________________________________________________  FINDINGS:     Left Ventricle:  Left ventricular systolic function is severely decreased with an ejection fraction visually estimated at <20%. There is global left ventricular hypokinesis. There is normal LV mass and normal geometry. There is a left ventricular thrombus.     Right Ventricle:  Normal systolic function. A device lead is visualized.     Aortic Valve:  The aortic valve was not well visualized.     Mitral Valve:  There is symmetric leaflet tethering. There is mild to moderate mitral regurgitation.     Pericardium:  There is a small pericardial effusion noted adjacent to the right ventricle with no evidence of hemodynamic compromise.     Pleura:  Right pleural effusion noted.  ____________________________________________________________________  Quantitative Data:  Left Ventricle Measurements: (Indexed to BSA)     IVSd (2D):   0.7 cm  LVPWd (2D):  0.7 cm  LVIDd (2D):  5.8 cm  LV Mass:     151 g  79.0 g/m²  Visualized LV EF%: <20%  ________________________________________________________________________________________  Electronically signed on 10/26/2023 at 1:35:07 PM by Puneet Landon M.D.      *** Final ***    Cath:

## 2023-10-30 NOTE — PROGRESS NOTE ADULT - REASON FOR ADMISSION
Patient being transferred to [facility] per [facility] at this time, belongings sent with patient NSTEMI

## 2023-10-30 NOTE — PROGRESS NOTE ADULT - ASSESSMENT
59 yo Male with prior Stroke with mild ?left sided deficits (improved), HTN, DM2, vertigo, HLD, Mobitz II s/pp [employer] dual chamber ICD (12/21/22) initially presented to [facility] from home with complaints of dyspnea and chest pain and was admitted w/ acute hypoxic respiratory failure likely due to acute pulmonary edema due to acute HFrEF in setting of acute NSTEMI, LAURA and enterovirus infection. Pt with brief MICU stay at [facility] requiring bipap (no intubation), was treated for PNA with cefepime, and was found to have TTE done 9/26/23 showing EF 20% with severely decreased LVSF, multiple regional wall motion abnormalities, and elevated LV end diastolic pressure. Pt was transferred to [facility] for cardiac eval.   TTE EF < 20%  RHC 10/19: RA 10, RV 47/9/13, Wedge 29, PA 41/26/33, CO/CI 4.4/2.3, PA sat 57.3  EKG: sinus tachycardia, septal q-waves, left axis deviation   TTE 10/16/23: LV 5.5 cm, LVEF 20% with regional WMAs worse in the apex, LVOT VTI 8 cm, normal RV size/function, severe functional MR, small pericardial effusion, estimated RA pressure 8 mmHg   TTE 12/2022: normal LVEF   LHC: 95% discrete proximal LAD disease and sequential multifocal disease with good distal target, 80-90% proximal LCx disease just prior to marginals, severe multifocal RCA disease with good targets   A1c 8.4    CD 10/17 neg   NIHSS 1  CTH with age indeterminate R cerebellar infarct.  likely chronic     Impression:   R cerebellar infarct, age indeterminate, likely chronic   cognitive issues, not understanding why he is in hospital r/o embolic shower. 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   - no objection to heparin drip for low EF and LV thrombus   - called for risk stratification for heart transplant eval, likel;y low risk and no objection but would check imaging firts   - check MRI brain and MRA H/N  r/o embolic shower   - b12, TSH, RPR for reversible causes of dementia   - consider psych eval   - telemetry  - PT/OT   - check FS, glucose control <180  - GI/DVT ppx   - Thank you for allowing me to participate in the care of this patient. Call with questions.   spoke with primary team  spoke iwth heart failure   Abelardo Irving MD  Vascular Neurology  Office: [phone]

## 2023-10-30 NOTE — PROGRESS NOTE ADULT - PROBLEM SELECTOR PLAN 3
- Seen on 10/26 limited TTE  - Cont hep gtt   - No longer a candidate for impella assisted PCI, will discuss with interventional and surgical teams possibility of other forms of support  - would be candidate for IABP if decompensates

## 2023-10-30 NOTE — PROGRESS NOTE ADULT - SUBJECTIVE AND OBJECTIVE BOX
Neurology        S: patient seen and examined. [facility] with cerebellar infarct now in ICU          Medications: MEDICATIONS  (STANDING):  aspirin enteric coated 81 milliGRAM(s) Oral daily  atorvastatin 80 milliGRAM(s) Oral at bedtime  buMETAnide 2 milliGRAM(s) Oral daily  chlorhexidine 2% Cloths 1 Application(s) Topical daily  digoxin     Tablet 125 MICROGram(s) Oral daily  heparin  Infusion 1200 Unit(s)/Hr (11 mL/Hr) IV Continuous <Continuous>  insulin glargine Injectable (LANTUS) 26 Unit(s) SubCutaneous at bedtime  insulin lispro (ADMELOG) corrective regimen sliding scale   SubCutaneous three times a day before meals  insulin lispro (ADMELOG) corrective regimen sliding scale   SubCutaneous at bedtime  insulin lispro Injectable (ADMELOG) 10 Unit(s) SubCutaneous three times a day before meals  milrinone Infusion 0.25 MICROgram(s)/kG/Min (5.25 mL/Hr) IV Continuous <Continuous>  pantoprazole    Tablet 40 milliGRAM(s) Oral before breakfast  senna 2 Tablet(s) Oral at bedtime  tamsulosin 0.4 milliGRAM(s) Oral at bedtime    MEDICATIONS  (PRN):  benzocaine/menthol Lozenge 1 Lozenge Oral every 2 hours PRN Sore Throat  polyethylene glycol 3350 17 Gram(s) Oral daily PRN Constipation       Vitals:  Vital Signs Last 24 Hrs  T(C): 36.9 (30 Oct 2023 08:00), Max: 36.9 (29 Oct 2023 14:10)  T(F): 98.5 (30 Oct 2023 08:00), Max: 98.5 (30 Oct 2023 08:00)  HR: 127 (30 Oct 2023 08:00) (126 - 144)  BP: 109/68 (30 Oct 2023 08:00) (81/60 - 109/68)  BP(mean): 85 (30 Oct 2023 08:00) (65 - 85)  RR: 20 (30 Oct 2023 08:00) (11 - 36)  SpO2: 96% (30 Oct 2023 08:00) (88% - 97%)    Parameters below as of 30 Oct 2023 08:00  Patient On (Oxygen Delivery Method): nasal cannula  O2 Flow (L/min): 2             General Exam:   General Appearance: Appropriately dressed and in no acute distress       Head: Normocephalic, atraumatic and no dysmorphic features  Ear, Nose, and Throat: Moist mucous membranes  CVS: S1S2+  Resp: No SOB, no wheeze or rhonchi  GI: soft NT/ND  Extremities: No edema or cyanosis  Skin: No bruises or rashes     Neurological Exam:  Mental Status: Awake, alert and oriented x 2.  Able to follow simple verbal commands. Able to name and repeat. fluent speech. No obvious aphasia or dysarthria noted. poor insight. not understanding why he is in hospital   Cranial Nerves: PERRL, EOMI, VFFC, sensation V1-V3 intact,  no obvious facial asymmetry, equal elevation of palate, scm/trap 5/5, tongue is midline on protrusion. no obvious papilledema on fundoscopic exam. hearing is grossly intact.   Motor: Normal bulk, tone and strength throughout. Fine finger movements were intact and symmetric. no tremors or drift noted.    Sensation: Intact to light touch and pinprick throughout. no right/left confusion. no extinction to tactile on DSS. Romberg was negative.   Reflexes: 1+ throughout at biceps, brachioradialis, triceps, patellars and ankles bilaterally and equal. No clonus. R toe and L toe were both downgoing.  Coordination: No dysmetria on FNF or HKS  Gait:   no limitations in gait.     Data/Labs/Imaging which I personally reviewed.     Labs:     LABS:                          10.0   8.11  )-----------( 211      ( 30 Oct 2023 05:08 )             31.4     10-30    138  |  105  |  44<H>  ----------------------------<  218<H>  3.9   |  22  |  1.87<H>    Ca    8.3<L>      30 Oct 2023 05:07  Phos  4.4     10-30  Mg     2.2     10-30    TPro  5.7<L>  /  Alb  2.9<L>  /  TBili  0.3  /  DBili  x   /  AST  13  /  ALT  21  /  AlkPhos  55  10-30    LIVER FUNCTIONS - ( 30 Oct 2023 05:07 )  Alb: 2.9 g/dL / Pro: 5.7 g/dL / ALK PHOS: 55 U/L / ALT: 21 U/L / AST: 13 U/L / GGT: x           PT/INR - ( 30 Oct 2023 05:08 )   PT: 12.9 sec;   INR: 1.18 ratio         PTT - ( 30 Oct 2023 05:08 )  PTT:78.3 sec  Urinalysis Basic - ( 30 Oct 2023 05:07 )    Color: x / Appearance: x / SG: x / pH: x  Gluc: 218 mg/dL / Ketone: x  / Bili: x / Urobili: x   Blood: x / Protein: x / Nitrite: x   Leuk Esterase: x / RBC: x / WBC x   Sq Epi: x / Non Sq Epi: x / Bacteria: x                  < from: CT Head No Cont (10.27.23 @ 18:04) >    ACC: [id number] EXAM:  CT BRAIN   ORDERED BY: BRETT HOPSON     PROCEDURE DATE:  10/27/2023          INTERPRETATION:  Clinical Indication: CVA    5mm axial sections of the brain were obtained from base to vertex,   without the intravenous administration of contrast material. Coronal and   sagittal computer generated reconstructed views are available.    No prior brain imaging is available for comparison.          The ventricles and sulci are prominent consistent with mild atrophy.   Small vesselwhite matter ischemic changes are noted. There is a infarct   in the right medial cerebellum of undetermined age which could be acute   to subacute based on its degree of lucency. There is no significant   hemorrhage. Bone window examination is unremarkable. There is been   previous right-sided lens replacement surgery.      IMPRESSION: Atrophy and small vessel white matter ischemic changes. Right   medial cerebellar infarct may be acute versus subacute. No hemorrhage.      --- End of Report ---            ANDREW TONEY MD; Attending Radiologist  This document has been electronically signed. Oct 27 2023  6:25PM    < end of copied text >

## 2023-10-30 NOTE — PROGRESS NOTE ADULT - ATTENDING COMMENTS
57yo M with CVA DM2 s/p ICD here with shortness of breath, admitted for ADHF vs PNA and LHC on 10/16 showed 3v dz and reduced EF. RHC on 10/19 showed CVP of 10, PCWP 29 and CI of 2.3, started empirically on  weanied off and is now on milrinone     Neuro: awaiting   Pulm: volume overload  CV: c/w milrninone and recheck numbers: once neuro work up is done will contact   Renal: not responding to diuresis despite volume overload, c/w bumex   GI: DASH diet  ID: no abx  Heme: sqh ppx  Endo: BG controlled  Groin swan - with poor access in the neck as per the US - will d\w the cath team: ?brachial    Patient is critically ill, requiring critical care services. Despite the current condition, the patient is at a high risk of clinical and hemodynamic demise

## 2023-10-30 NOTE — CHART NOTE - NSCHARTNOTEFT_GEN_A_CORE
Interim events  MRI brain and MRA head/neck performed 10/30/23, report as follows:  MRI BRAIN:  1. Evolving acute/subacute right-sided PICA distribution infarction with   associated cytotoxic edema and very mild hemorrhagic transformation.  2. Additional wedge-shaped area of acute/subacute ischemia within the   left parietal periatrial white matter with associated cytotoxic edema.  3. Indeterminate soft tissue lesion involving the left superior pinna   measuring 1.4 x 0.9 cm. Neoplasm cannot be excluded. Recommend   correlation with direct physical examination and visualization.    MRA NECK:  1. Extremely motion limited study.  2. 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 Recommend further evaluation with a CT angiogram study   of the neck.    MRA HEAD:  1. 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 This can be further evaluated with a CT angiogram study of the   head.  2. Mild focal stenosis of the right supraclinoid intracranial internal   carotid artery secondary to atherosclerosis.  3. Otherwise, no large vessel occlusion or major stenosis.    Given findings of c/f hemorrhagic transformation as noted on MR brain, recommended obtaining repeat head CT to further evaluate.    Repeat head CT report as follows:  No evidence of acute infarction or hemorrhage.  Subacute or old appearing ischemic change medial aspect of the right   cerebellar hemisphere.  Small vessel and atrophic changes.    Recommendations:  [] c/w ASA and statin therapy for secondary stroke prevention  [] on heparin gtt for low EF and LV thrombus, ok to continue for now given repeat head CT findings demonstrating no acute hemorrhage  [] telemetry, PT/OT  [] glucose control < 180  [] GI/DVT ppx per primary team  [] TSH 0.39, B12 343, RPR negative    Case and imaging reviewed with stroke fellow Dr. Evelio Gama under supervision of Dr. Buitrago. Interim events  MRI brain and MRA head/neck performed 10/30/23, report as follows:  MRI BRAIN:  1. Evolving acute/subacute right-sided PICA distribution infarction with   associated cytotoxic edema and very mild hemorrhagic transformation.  2. Additional wedge-shaped area of acute/subacute ischemia within the   left parietal periatrial white matter with associated cytotoxic edema.  3. Indeterminate soft tissue lesion involving the left superior pinna   measuring 1.4 x 0.9 cm. Neoplasm cannot be excluded. Recommend   correlation with direct physical examination and visualization.    MRA NECK:  1. Extremely motion limited study.  2. 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 Recommend further evaluation with a CT angiogram study   of the neck.    MRA HEAD:  1. 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 This can be further evaluated with a CT angiogram study of the   head.  2. Mild focal stenosis of the right supraclinoid intracranial internal   carotid artery secondary to atherosclerosis.  3. Otherwise, no large vessel occlusion or major stenosis.    Given findings of c/f hemorrhagic transformation as noted on MR brain, recommended obtaining repeat head CT to further evaluate.    Repeat head CT report as follows:  No evidence of acute infarction or hemorrhage.  Subacute or old appearing ischemic change medial aspect of the right   cerebellar hemisphere.  Small vessel and atrophic changes.    Recommendations:  [] c/w ASA and statin therapy for secondary stroke prevention  [] on heparin gtt for low EF and LV thrombus, ok to continue for now (please maintain PTT goal 50-75, and no bolus) given repeat head CT findings demonstrating no acute hemorrhage  [] telemetry, PT/OT  [] glucose control < 180  [] GI/DVT ppx per primary team  [] TSH 0.39, B12 343, RPR negative    Case and imaging reviewed with stroke fellow Dr. Evelio Gama under supervision of Dr. Buitrago.

## 2023-10-30 NOTE — PROGRESS NOTE ADULT - ASSESSMENT
57 YO M with a history of CVA with residual mild R paresthesias, second degree Mobitz II heart block s/p DC-ICD 12/2022 (initial concern for sarcoid but negative biopsy/PET post procedure), DM2 (A1c 8.4%), and HTN who was admitted to [facility] with chest pain and dyspnea, found to have NSTEMI with markedly elevated troponins and new severe LV dysfunction with regional wall motion abnormalities as well as + enterovirus. He required NIPPV and was diuresed before being transferred to [facility] where LHC performed which revealed severe 3v CAD with critical proximal LAD involvement. CTS was consulted for CABG evaluation and HF consulted for comanagement.    He ultimately underwent RHC with revealed elevated wedge but normal cardiac output. Within the past 24 hours patient has become more tachycardic, is cool on exam and noted to have elevated JVP. He was transferred to CICU for swan placement and closer observation of hemodynamics. At this time patient would be consider poor candidate for CABG however would  benefit from high risk PCI with the potential need for advance therapies. Found to have LV thrombus. Given concern for low flow status thus sent back to CCU 10/28. He is currently in ST with HR in 120s, his BP is marginal on low dose milrinone. He appears close to euvolemic on exam. Bedside IVC POCUS shows nondilated IVC. He is confused and was found to have R medial cerebellar infarct on head CT, awaiting brain MRI and cognitive testing to further evaluate. His renal function is stable and lactate is negative.     REVIEW OF STUDIES  TTE 10/26: EF <20% LVT present, small pericardial effusion w/o tamponade  RHC 10/19: RA 10, RV 47/9/13, Wedge 29, PA 41/26/33, CO/CI 4.4/2.3, PA sat 57.3  EKG: sinus tachycardia, septal q-waves, left axis deviation   TTE 10/16/23: LV 5.5 cm, LVEF 20% with regional WMAs worse in the apex, LVOT VTI 8 cm, normal RV size/function, severe functional MR, small pericardial effusion, estimated RA pressure 8 mmHg   TTE 12/2022: normal LVEF   LHC: 95% discrete proximal LAD disease and sequential multifocal disease with good distal target, 80-90% proximal LCx disease just prior to marginals, severe multifocal RCA disease with good targets     Hemodynamics:  10/21/23: RA 13 with V-wave 21, PA 50/33, PCW 33, MvO2 68.2, Cris CO/CI 4.4/2.56

## 2023-10-30 NOTE — CONSULT NOTE ADULT - SUBJECTIVE AND OBJECTIVE BOX
Behavioral Cardiology Psychological Assessment     History of present illness: Mr. Carlson is a 58 year-old man    Social history:  22 years, no children. Domiciled with wife in private house in [place]. Born in [place], came to [place] ago, [place] citizen. 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 For the past 2 years was working as an [employer]  up until 9/23 when he developed chest pain and went to urgent care, was told he had acid reflux and went home. Continued to have chest pain and went to [facility] ED, then admitted.     Past psychiatric history: As per wife, patient never treated for psychiatric illness.     Psychological assessment: Patient seen resting in bed with eyes closed, somnolent. When asked where he was, believed he was still at [facility] and unable to identify date. When told he was at another hospital did not recall transfer. Unable to complete full assessment as patient somnolent.       As per wife, patient had a stroke in 2012 and has noted memory issues since that time. Despite that, reports he manages his diabetes (monitors glucose levels_) and takes medications independently.      Risk assessment: Low acute risk.   Risk factors: Chronic health issue, age.    Protective factors: Strong family support, no history of s/a. no family history of s/a, no depression, no anxiety.     Mental status exam: Patient seen lying in bed on CICU. Somnolent, responsive when name called but not able to maintain focus/attention. Unable to identify name of hospital, date, reasons for hospitalization.     Impression:     Dx:     Recommendations:    Behavioral Cardiology Psychological Assessment     History of present illness: Mr. Carlson is a 58 year-old man, history of prior stroke with mild deficits (improved), HTN, DM2, vertigo, HLD, Mobitz II s/p Medtronic dual chamber ICD (22) initially presented to [facility] with dyspnea and chest pain, admitted w/ acute hypoxic respiratory failure likely due to acute pulmonary edema due to acute HFrEF in setting of acute NSTEMI, LAURA and enterovirus infection, treated for PNA with cefepime. Had TTE 23 showing EF 20% with severely decreased LVSF, multiple regional wall motion abnormalities, and elevated LV end diastolic pressure. Pt was transferred to [facility] for cardiac eval. Seen by neurology. Psychology consult requested for cognitive assessment.     Social history: Information obtained from patient’s wife, Mame.  22 years, no children. Domiciled with wife in private house in [place]. Born in [place], came to [place], [place]. Parents . Has 9 siblings living in [place]. 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 For the past 2 years was working as an [employer] , stopped working  when he developed chest pain and was hospitalized at [facility].      Substance use: (As per wife).   •	Tobacco: none   •	Alcohol: none  •	Drug: none     Adherence to treatment guidelines:   •	Medication: As per wife, patient manages his own medications independently. Also manages his diabetes, checks finger sticks which have been in normal range.   •	Low Na diet: Wife and patient prepare meals, follow diabetic and low Na diet.       Past psychiatric history: As per wife, patient never treated for psychiatric illness.     Psychological assessment: Patient seen resting in bed with eyes closed, somnolent. When asked where he was, believed he was still at [facility] and unable to identify date. When told he was at another hospital did not recall transfer. Unable to complete full assessment as patient somnolent, falling asleep mid-sentence. As per staff, patient was more alert earlier today but confused about his age, location, date.       With patient’s permission, spoke to his wife who reports that her  had a stroke in  and although she has noted memory issues, he manages his diabetes (monitors glucose levels) and takes medications independently. Denies noticing changes in memory/cognition since hospitalization. Notes her  typically goes to bed around 3am and wakes at 2pm. Stated she is his primary support person. Per wife, patient very close with siblings and other family in [place]. They have been trying to obtain visa for one of his brothers to come to [place]. At home he talks with family by phone and watches TV. Prior to hospitalization was working as [employer] .     Risk assessment: Low acute risk.   Risk factors: Chronic health issue, age.    Protective factors: Strong family support, no history of s/a. no family history of s/a, no depression, no anxiety.     Mental status exam: Patient seen lying in bed on CICU. Somnolent, responsive when name called but not able to maintain focus/attention. Unable to identify name of hospital, date, reasons for hospitalization. Reports feeling “sleepy.” Denies poor sleep last night. Poor insight into disease. Limited judgment.     Impression: Mr. Carlson is a 58 year-old man with history of prior stroke, DM2, HTN, ICD, admitted to [facility] with acute hypoxic respiratory failure likely due to acute pulmonary edema due to acute HFrEF (20%). Unable to complete full cognitive assessment due to patient's somnolence responsive when name called but feel asleep mid sentence). Has poor insight into disease. As per staff, more awake/alert earlier but with confusion about his age, date, name of hospital, did not remember transfer to [facility]. Scheduled for MRI this afternoon.     Dx: Differential diagnosis: delirium vs dementia vs neuro event     Recommendations:  ·	Brief cognitive screen when more awake and alert  ·	Maintain sleep wake cycle   ·	Frequently reorient to time/place   ·	May benefit from psychiatric consult     18 minutest spent on patient encounter

## 2023-10-30 NOTE — PROGRESS NOTE ADULT - NS ATTEND AMEND GEN_ALL_CORE FT
Pt returned from ECHO with family and aide at bedside. Pt tolerated procedure well, no complaints at this time. Vital signs stable, will continue to monitor 59 y/o M wit h/o CVA with residual mild R paresthesias, DM, second degree AV block s/p PPM, HTN admitted to [facility] with NSTEMI and new onset LV dysfunction. LHC showed triple vessel disease. HF consulted for management of heart failure and evaluation for advanced therapies as bailout strategy prior to revascularization.     TTE done 10/26 showed (my review) LVEF 20-25%, LVIDD 5.6 cm, no significant MR, RV normal size and function, IVC dilated and not collapsing, LV apical thrombus. CMR on 10/18 showed: greater than 50% LGE in mid anteroseptum, anterior and anterolateral segments concerning for no viability of LAD territory.     His course has been complicated by concern for low output; he was stated on milrinone and his GDMT held due to hypotension/ LAURA. Also AMS with CT showing cerebellar infarct of indeterminate age. He is going for MRI brain today for further investigation. Tachycardia which was initially thought to be AT but ruled it out, so deferred EMILEE/DCCV today. He is overloaded on exam. POCUS shows dilated IVC.     Continue current support with milrinone gtt   Bumex 2 mg iv push, further guidance on diuretics after RHC   CMR brain to further investigate AMS chronicity of stroke   Psychology/neuro/psych evaluation   EP follow up to assist in management of tachycardia   PT evaluation   Strict I/Os  Daily weight   Interventional cardiology and CT surgery follow up for possible revascularization. Unfortunately, he is not a candidate for Impella support given LV apical thrombus. IABP would be appropriate if needed for decompensation   Further recommendations pending clinical course  Rest of care per CCU team

## 2023-10-31 LAB
ALBUMIN SERPL ELPH-MCNC: 2.9 G/DL — LOW (ref 3.3–5)
ALBUMIN SERPL ELPH-MCNC: 2.9 G/DL — LOW (ref 3.3–5)
ALBUMIN SERPL ELPH-MCNC: 3 G/DL — LOW (ref 3.3–5)
ALBUMIN SERPL ELPH-MCNC: 3 G/DL — LOW (ref 3.3–5)
ALP SERPL-CCNC: 65 U/L — SIGNIFICANT CHANGE UP (ref 40–120)
ALP SERPL-CCNC: 65 U/L — SIGNIFICANT CHANGE UP (ref 40–120)
ALP SERPL-CCNC: 67 U/L — SIGNIFICANT CHANGE UP (ref 40–120)
ALP SERPL-CCNC: 67 U/L — SIGNIFICANT CHANGE UP (ref 40–120)
ALT FLD-CCNC: 27 U/L — SIGNIFICANT CHANGE UP (ref 10–45)
ALT FLD-CCNC: 27 U/L — SIGNIFICANT CHANGE UP (ref 10–45)
ALT FLD-CCNC: 28 U/L — SIGNIFICANT CHANGE UP (ref 10–45)
ALT FLD-CCNC: 28 U/L — SIGNIFICANT CHANGE UP (ref 10–45)
ANION GAP SERPL CALC-SCNC: 13 MMOL/L — SIGNIFICANT CHANGE UP (ref 5–17)
ANION GAP SERPL CALC-SCNC: 13 MMOL/L — SIGNIFICANT CHANGE UP (ref 5–17)
ANION GAP SERPL CALC-SCNC: 14 MMOL/L — SIGNIFICANT CHANGE UP (ref 5–17)
ANION GAP SERPL CALC-SCNC: 14 MMOL/L — SIGNIFICANT CHANGE UP (ref 5–17)
APTT BLD: 49.9 SEC — HIGH (ref 24.5–35.6)
APTT BLD: 49.9 SEC — HIGH (ref 24.5–35.6)
APTT BLD: 68.1 SEC — HIGH (ref 24.5–35.6)
APTT BLD: 68.1 SEC — HIGH (ref 24.5–35.6)
APTT BLD: 68.8 SEC — HIGH (ref 24.5–35.6)
APTT BLD: 68.8 SEC — HIGH (ref 24.5–35.6)
AST SERPL-CCNC: 19 U/L — SIGNIFICANT CHANGE UP (ref 10–40)
AST SERPL-CCNC: 19 U/L — SIGNIFICANT CHANGE UP (ref 10–40)
AST SERPL-CCNC: 23 U/L — SIGNIFICANT CHANGE UP (ref 10–40)
AST SERPL-CCNC: 23 U/L — SIGNIFICANT CHANGE UP (ref 10–40)
BASE EXCESS BLDMV CALC-SCNC: -1.1 MMOL/L — SIGNIFICANT CHANGE UP (ref -3–3)
BASE EXCESS BLDMV CALC-SCNC: -1.1 MMOL/L — SIGNIFICANT CHANGE UP (ref -3–3)
BASE EXCESS BLDMV CALC-SCNC: -1.2 MMOL/L — SIGNIFICANT CHANGE UP (ref -3–3)
BASE EXCESS BLDMV CALC-SCNC: -1.2 MMOL/L — SIGNIFICANT CHANGE UP (ref -3–3)
BASE EXCESS BLDMV CALC-SCNC: -2.3 MMOL/L — SIGNIFICANT CHANGE UP (ref -3–3)
BASE EXCESS BLDMV CALC-SCNC: -2.3 MMOL/L — SIGNIFICANT CHANGE UP (ref -3–3)
BILIRUB SERPL-MCNC: 0.2 MG/DL — SIGNIFICANT CHANGE UP (ref 0.2–1.2)
BUN SERPL-MCNC: 41 MG/DL — HIGH (ref 7–23)
BUN SERPL-MCNC: 41 MG/DL — HIGH (ref 7–23)
BUN SERPL-MCNC: 47 MG/DL — HIGH (ref 7–23)
BUN SERPL-MCNC: 47 MG/DL — HIGH (ref 7–23)
CALCIUM SERPL-MCNC: 8.6 MG/DL — SIGNIFICANT CHANGE UP (ref 8.4–10.5)
CALCIUM SERPL-MCNC: 8.6 MG/DL — SIGNIFICANT CHANGE UP (ref 8.4–10.5)
CALCIUM SERPL-MCNC: 8.8 MG/DL — SIGNIFICANT CHANGE UP (ref 8.4–10.5)
CALCIUM SERPL-MCNC: 8.8 MG/DL — SIGNIFICANT CHANGE UP (ref 8.4–10.5)
CHLORIDE SERPL-SCNC: 103 MMOL/L — SIGNIFICANT CHANGE UP (ref 96–108)
CHLORIDE SERPL-SCNC: 103 MMOL/L — SIGNIFICANT CHANGE UP (ref 96–108)
CHLORIDE SERPL-SCNC: 104 MMOL/L — SIGNIFICANT CHANGE UP (ref 96–108)
CHLORIDE SERPL-SCNC: 104 MMOL/L — SIGNIFICANT CHANGE UP (ref 96–108)
CO2 BLDMV-SCNC: 24 MMOL/L — SIGNIFICANT CHANGE UP (ref 21–29)
CO2 BLDMV-SCNC: 24 MMOL/L — SIGNIFICANT CHANGE UP (ref 21–29)
CO2 BLDMV-SCNC: 25 MMOL/L — SIGNIFICANT CHANGE UP (ref 21–29)
CO2 SERPL-SCNC: 19 MMOL/L — LOW (ref 22–31)
CO2 SERPL-SCNC: 19 MMOL/L — LOW (ref 22–31)
CO2 SERPL-SCNC: 20 MMOL/L — LOW (ref 22–31)
CO2 SERPL-SCNC: 20 MMOL/L — LOW (ref 22–31)
CREAT SERPL-MCNC: 2 MG/DL — HIGH (ref 0.5–1.3)
CREAT SERPL-MCNC: 2 MG/DL — HIGH (ref 0.5–1.3)
CREAT SERPL-MCNC: 2.11 MG/DL — HIGH (ref 0.5–1.3)
CREAT SERPL-MCNC: 2.11 MG/DL — HIGH (ref 0.5–1.3)
EGFR: 36 ML/MIN/1.73M2 — LOW
EGFR: 36 ML/MIN/1.73M2 — LOW
EGFR: 38 ML/MIN/1.73M2 — LOW
EGFR: 38 ML/MIN/1.73M2 — LOW
GAS PNL BLDA: SIGNIFICANT CHANGE UP
GAS PNL BLDA: SIGNIFICANT CHANGE UP
GAS PNL BLDMV: SIGNIFICANT CHANGE UP
GLUCOSE BLDC GLUCOMTR-MCNC: 126 MG/DL — HIGH (ref 70–99)
GLUCOSE BLDC GLUCOMTR-MCNC: 126 MG/DL — HIGH (ref 70–99)
GLUCOSE BLDC GLUCOMTR-MCNC: 167 MG/DL — HIGH (ref 70–99)
GLUCOSE BLDC GLUCOMTR-MCNC: 167 MG/DL — HIGH (ref 70–99)
GLUCOSE BLDC GLUCOMTR-MCNC: 182 MG/DL — HIGH (ref 70–99)
GLUCOSE BLDC GLUCOMTR-MCNC: 182 MG/DL — HIGH (ref 70–99)
GLUCOSE BLDC GLUCOMTR-MCNC: 213 MG/DL — HIGH (ref 70–99)
GLUCOSE BLDC GLUCOMTR-MCNC: 213 MG/DL — HIGH (ref 70–99)
GLUCOSE SERPL-MCNC: 125 MG/DL — HIGH (ref 70–99)
GLUCOSE SERPL-MCNC: 125 MG/DL — HIGH (ref 70–99)
GLUCOSE SERPL-MCNC: 176 MG/DL — HIGH (ref 70–99)
GLUCOSE SERPL-MCNC: 176 MG/DL — HIGH (ref 70–99)
HCO3 BLDMV-SCNC: 22 MMOL/L — SIGNIFICANT CHANGE UP (ref 20–28)
HCO3 BLDMV-SCNC: 22 MMOL/L — SIGNIFICANT CHANGE UP (ref 20–28)
HCO3 BLDMV-SCNC: 24 MMOL/L — SIGNIFICANT CHANGE UP (ref 20–28)
HCT VFR BLD CALC: 32.4 % — LOW (ref 39–50)
HCT VFR BLD CALC: 32.4 % — LOW (ref 39–50)
HCT VFR BLD CALC: 33.7 % — LOW (ref 39–50)
HCT VFR BLD CALC: 33.7 % — LOW (ref 39–50)
HGB BLD-MCNC: 10.2 G/DL — LOW (ref 13–17)
HGB BLD-MCNC: 10.2 G/DL — LOW (ref 13–17)
HGB BLD-MCNC: 10.4 G/DL — LOW (ref 13–17)
HGB BLD-MCNC: 10.4 G/DL — LOW (ref 13–17)
HOROWITZ INDEX BLDMV+IHG-RTO: 21 — SIGNIFICANT CHANGE UP
LACTATE SERPL-SCNC: 1.1 MMOL/L — SIGNIFICANT CHANGE UP (ref 0.5–2)
LACTATE SERPL-SCNC: 1.1 MMOL/L — SIGNIFICANT CHANGE UP (ref 0.5–2)
LACTATE SERPL-SCNC: 1.7 MMOL/L — SIGNIFICANT CHANGE UP (ref 0.5–2)
LACTATE SERPL-SCNC: 1.7 MMOL/L — SIGNIFICANT CHANGE UP (ref 0.5–2)
LACTATE SERPL-SCNC: 1.8 MMOL/L — SIGNIFICANT CHANGE UP (ref 0.5–2)
LACTATE SERPL-SCNC: 1.8 MMOL/L — SIGNIFICANT CHANGE UP (ref 0.5–2)
MAGNESIUM SERPL-MCNC: 2 MG/DL — SIGNIFICANT CHANGE UP (ref 1.6–2.6)
MAGNESIUM SERPL-MCNC: 2 MG/DL — SIGNIFICANT CHANGE UP (ref 1.6–2.6)
MAGNESIUM SERPL-MCNC: 2.2 MG/DL — SIGNIFICANT CHANGE UP (ref 1.6–2.6)
MAGNESIUM SERPL-MCNC: 2.2 MG/DL — SIGNIFICANT CHANGE UP (ref 1.6–2.6)
MCHC RBC-ENTMCNC: 25.5 PG — LOW (ref 27–34)
MCHC RBC-ENTMCNC: 25.5 PG — LOW (ref 27–34)
MCHC RBC-ENTMCNC: 25.6 PG — LOW (ref 27–34)
MCHC RBC-ENTMCNC: 25.6 PG — LOW (ref 27–34)
MCHC RBC-ENTMCNC: 30.9 GM/DL — LOW (ref 32–36)
MCHC RBC-ENTMCNC: 30.9 GM/DL — LOW (ref 32–36)
MCHC RBC-ENTMCNC: 31.5 GM/DL — LOW (ref 32–36)
MCHC RBC-ENTMCNC: 31.5 GM/DL — LOW (ref 32–36)
MCV RBC AUTO: 81 FL — SIGNIFICANT CHANGE UP (ref 80–100)
MCV RBC AUTO: 81 FL — SIGNIFICANT CHANGE UP (ref 80–100)
MCV RBC AUTO: 82.8 FL — SIGNIFICANT CHANGE UP (ref 80–100)
MCV RBC AUTO: 82.8 FL — SIGNIFICANT CHANGE UP (ref 80–100)
NRBC # BLD: 0 /100 WBCS — SIGNIFICANT CHANGE UP (ref 0–0)
O2 CT VFR BLD CALC: 32 MMHG — SIGNIFICANT CHANGE UP (ref 30–65)
O2 CT VFR BLD CALC: 32 MMHG — SIGNIFICANT CHANGE UP (ref 30–65)
O2 CT VFR BLD CALC: 34 MMHG — SIGNIFICANT CHANGE UP (ref 30–65)
O2 CT VFR BLD CALC: 34 MMHG — SIGNIFICANT CHANGE UP (ref 30–65)
O2 CT VFR BLD CALC: 36 MMHG — SIGNIFICANT CHANGE UP (ref 30–65)
O2 CT VFR BLD CALC: 36 MMHG — SIGNIFICANT CHANGE UP (ref 30–65)
PCO2 BLDMV: 38 MMHG — SIGNIFICANT CHANGE UP (ref 30–65)
PCO2 BLDMV: 39 MMHG — SIGNIFICANT CHANGE UP (ref 30–65)
PCO2 BLDMV: 39 MMHG — SIGNIFICANT CHANGE UP (ref 30–65)
PH BLDMV: 7.38 — SIGNIFICANT CHANGE UP (ref 7.32–7.45)
PH BLDMV: 7.38 — SIGNIFICANT CHANGE UP (ref 7.32–7.45)
PH BLDMV: 7.39 — SIGNIFICANT CHANGE UP (ref 7.32–7.45)
PH BLDMV: 7.39 — SIGNIFICANT CHANGE UP (ref 7.32–7.45)
PH BLDMV: 7.4 — SIGNIFICANT CHANGE UP (ref 7.32–7.45)
PH BLDMV: 7.4 — SIGNIFICANT CHANGE UP (ref 7.32–7.45)
PHOSPHATE SERPL-MCNC: 4.6 MG/DL — HIGH (ref 2.5–4.5)
PHOSPHATE SERPL-MCNC: 4.6 MG/DL — HIGH (ref 2.5–4.5)
PHOSPHATE SERPL-MCNC: 4.7 MG/DL — HIGH (ref 2.5–4.5)
PHOSPHATE SERPL-MCNC: 4.7 MG/DL — HIGH (ref 2.5–4.5)
PLATELET # BLD AUTO: 140 K/UL — LOW (ref 150–400)
PLATELET # BLD AUTO: 140 K/UL — LOW (ref 150–400)
PLATELET # BLD AUTO: 233 K/UL — SIGNIFICANT CHANGE UP (ref 150–400)
PLATELET # BLD AUTO: 233 K/UL — SIGNIFICANT CHANGE UP (ref 150–400)
POTASSIUM SERPL-MCNC: 4.1 MMOL/L — SIGNIFICANT CHANGE UP (ref 3.5–5.3)
POTASSIUM SERPL-MCNC: 4.1 MMOL/L — SIGNIFICANT CHANGE UP (ref 3.5–5.3)
POTASSIUM SERPL-MCNC: 4.2 MMOL/L — SIGNIFICANT CHANGE UP (ref 3.5–5.3)
POTASSIUM SERPL-MCNC: 4.2 MMOL/L — SIGNIFICANT CHANGE UP (ref 3.5–5.3)
POTASSIUM SERPL-SCNC: 4.1 MMOL/L — SIGNIFICANT CHANGE UP (ref 3.5–5.3)
POTASSIUM SERPL-SCNC: 4.1 MMOL/L — SIGNIFICANT CHANGE UP (ref 3.5–5.3)
POTASSIUM SERPL-SCNC: 4.2 MMOL/L — SIGNIFICANT CHANGE UP (ref 3.5–5.3)
POTASSIUM SERPL-SCNC: 4.2 MMOL/L — SIGNIFICANT CHANGE UP (ref 3.5–5.3)
PROT SERPL-MCNC: 5.9 G/DL — LOW (ref 6–8.3)
PROT SERPL-MCNC: 5.9 G/DL — LOW (ref 6–8.3)
PROT SERPL-MCNC: 6.3 G/DL — SIGNIFICANT CHANGE UP (ref 6–8.3)
PROT SERPL-MCNC: 6.3 G/DL — SIGNIFICANT CHANGE UP (ref 6–8.3)
RBC # BLD: 4 M/UL — LOW (ref 4.2–5.8)
RBC # BLD: 4 M/UL — LOW (ref 4.2–5.8)
RBC # BLD: 4.07 M/UL — LOW (ref 4.2–5.8)
RBC # BLD: 4.07 M/UL — LOW (ref 4.2–5.8)
RBC # FLD: 15.4 % — HIGH (ref 10.3–14.5)
RBC # FLD: 15.4 % — HIGH (ref 10.3–14.5)
RBC # FLD: 15.5 % — HIGH (ref 10.3–14.5)
RBC # FLD: 15.5 % — HIGH (ref 10.3–14.5)
SAO2 % BLDMV: 54.5 — LOW (ref 60–90)
SAO2 % BLDMV: 54.5 — LOW (ref 60–90)
SAO2 % BLDMV: 58.1 — LOW (ref 60–90)
SAO2 % BLDMV: 58.1 — LOW (ref 60–90)
SAO2 % BLDMV: 59.1 — LOW (ref 60–90)
SAO2 % BLDMV: 59.1 — LOW (ref 60–90)
SODIUM SERPL-SCNC: 136 MMOL/L — SIGNIFICANT CHANGE UP (ref 135–145)
SODIUM SERPL-SCNC: 136 MMOL/L — SIGNIFICANT CHANGE UP (ref 135–145)
SODIUM SERPL-SCNC: 137 MMOL/L — SIGNIFICANT CHANGE UP (ref 135–145)
SODIUM SERPL-SCNC: 137 MMOL/L — SIGNIFICANT CHANGE UP (ref 135–145)
WBC # BLD: 7.46 K/UL — SIGNIFICANT CHANGE UP (ref 3.8–10.5)
WBC # BLD: 7.46 K/UL — SIGNIFICANT CHANGE UP (ref 3.8–10.5)
WBC # BLD: 8.38 K/UL — SIGNIFICANT CHANGE UP (ref 3.8–10.5)
WBC # BLD: 8.38 K/UL — SIGNIFICANT CHANGE UP (ref 3.8–10.5)
WBC # FLD AUTO: 7.46 K/UL — SIGNIFICANT CHANGE UP (ref 3.8–10.5)
WBC # FLD AUTO: 7.46 K/UL — SIGNIFICANT CHANGE UP (ref 3.8–10.5)
WBC # FLD AUTO: 8.38 K/UL — SIGNIFICANT CHANGE UP (ref 3.8–10.5)
WBC # FLD AUTO: 8.38 K/UL — SIGNIFICANT CHANGE UP (ref 3.8–10.5)

## 2023-10-31 PROCEDURE — 99291 CRITICAL CARE FIRST HOUR: CPT

## 2023-10-31 PROCEDURE — 93010 ELECTROCARDIOGRAM REPORT: CPT

## 2023-10-31 PROCEDURE — 99292 CRITICAL CARE ADDL 30 MIN: CPT

## 2023-10-31 PROCEDURE — 71045 X-RAY EXAM CHEST 1 VIEW: CPT | Mod: 26

## 2023-10-31 PROCEDURE — 99233 SBSQ HOSP IP/OBS HIGH 50: CPT

## 2023-10-31 RX ORDER — BUMETANIDE 0.25 MG/ML
2 INJECTION INTRAMUSCULAR; INTRAVENOUS ONCE
Refills: 0 | Status: DISCONTINUED | OUTPATIENT
Start: 2023-10-31 | End: 2023-10-31

## 2023-10-31 RX ORDER — HYDRALAZINE HCL 50 MG
10 TABLET ORAL THREE TIMES A DAY
Refills: 0 | Status: DISCONTINUED | OUTPATIENT
Start: 2023-10-31 | End: 2023-11-01

## 2023-10-31 RX ORDER — BUMETANIDE 0.25 MG/ML
2 INJECTION INTRAMUSCULAR; INTRAVENOUS ONCE
Refills: 0 | Status: COMPLETED | OUTPATIENT
Start: 2023-10-31 | End: 2023-10-31

## 2023-10-31 RX ORDER — DEXMEDETOMIDINE HYDROCHLORIDE IN 0.9% SODIUM CHLORIDE 4 UG/ML
0.05 INJECTION INTRAVENOUS
Qty: 200 | Refills: 0 | Status: DISCONTINUED | OUTPATIENT
Start: 2023-10-31 | End: 2023-10-31

## 2023-10-31 RX ADMIN — CHLORHEXIDINE GLUCONATE 1 APPLICATION(S): 213 SOLUTION TOPICAL at 21:44

## 2023-10-31 RX ADMIN — Medication 10 UNIT(S): at 12:17

## 2023-10-31 RX ADMIN — ATORVASTATIN CALCIUM 80 MILLIGRAM(S): 80 TABLET, FILM COATED ORAL at 21:45

## 2023-10-31 RX ADMIN — Medication 10 UNIT(S): at 08:10

## 2023-10-31 RX ADMIN — INSULIN GLARGINE 26 UNIT(S): 100 INJECTION, SOLUTION SUBCUTANEOUS at 21:43

## 2023-10-31 RX ADMIN — MILRINONE LACTATE 2.63 MICROGRAM(S)/KG/MIN: 1 INJECTION, SOLUTION INTRAVENOUS at 12:00

## 2023-10-31 RX ADMIN — HEPARIN SODIUM 12 UNIT(S)/HR: 5000 INJECTION INTRAVENOUS; SUBCUTANEOUS at 10:10

## 2023-10-31 RX ADMIN — Medication 10 UNIT(S): at 16:54

## 2023-10-31 RX ADMIN — MILRINONE LACTATE 5.25 MICROGRAM(S)/KG/MIN: 1 INJECTION, SOLUTION INTRAVENOUS at 10:11

## 2023-10-31 RX ADMIN — TAMSULOSIN HYDROCHLORIDE 0.4 MILLIGRAM(S): 0.4 CAPSULE ORAL at 21:44

## 2023-10-31 RX ADMIN — HEPARIN SODIUM 12 UNIT(S)/HR: 5000 INJECTION INTRAVENOUS; SUBCUTANEOUS at 21:44

## 2023-10-31 RX ADMIN — Medication 1: at 12:17

## 2023-10-31 RX ADMIN — BUMETANIDE 2 MILLIGRAM(S): 0.25 INJECTION INTRAMUSCULAR; INTRAVENOUS at 00:52

## 2023-10-31 RX ADMIN — Medication 125 MICROGRAM(S): at 05:25

## 2023-10-31 RX ADMIN — MILRINONE LACTATE 2.63 MICROGRAM(S)/KG/MIN: 1 INJECTION, SOLUTION INTRAVENOUS at 21:44

## 2023-10-31 RX ADMIN — BUMETANIDE 2 MILLIGRAM(S): 0.25 INJECTION INTRAMUSCULAR; INTRAVENOUS at 05:25

## 2023-10-31 RX ADMIN — Medication 81 MILLIGRAM(S): at 12:00

## 2023-10-31 RX ADMIN — PANTOPRAZOLE SODIUM 40 MILLIGRAM(S): 20 TABLET, DELAYED RELEASE ORAL at 05:25

## 2023-10-31 RX ADMIN — HEPARIN SODIUM 12 UNIT(S)/HR: 5000 INJECTION INTRAVENOUS; SUBCUTANEOUS at 00:56

## 2023-10-31 RX ADMIN — Medication 1: at 16:53

## 2023-10-31 RX ADMIN — BUMETANIDE 2 MILLIGRAM(S): 0.25 INJECTION INTRAMUSCULAR; INTRAVENOUS at 17:20

## 2023-10-31 RX ADMIN — Medication 10 MILLIGRAM(S): at 21:44

## 2023-10-31 NOTE — PROVIDER CONTACT NOTE (EICU) - SITUATION
Pt with AMS since 10/28. Pt A&Ox2. MRI Head completed revealing mild hemorrhagic transformation and cytotoxic edema. Repeat CT completed.

## 2023-10-31 NOTE — PROGRESS NOTE ADULT - NS ATTEND AMEND GEN_ALL_CORE FT
Patient seen at bedside in CICU. Remains in likely sinus tachycardia in the setting of Milrinone and HFrEF. No changes from an EP perspective at this time.     MAIDA Foote

## 2023-10-31 NOTE — PROGRESS NOTE ADULT - SUBJECTIVE AND OBJECTIVE BOX
24H hour events: Patient resting comfortably in bed, alert and oriented x 2.  No complaints of CP, palpitations or SOB.     MEDICATIONS:  aspirin enteric coated 81 milliGRAM(s) Oral daily  buMETAnide 2 milliGRAM(s) Oral daily  digoxin     Tablet 125 MICROGram(s) Oral daily  heparin  Infusion 1200 Unit(s)/Hr IV Continuous <Continuous>  milrinone Infusion 0.25 MICROgram(s)/kG/Min IV Continuous <Continuous>    pantoprazole    Tablet 40 milliGRAM(s) Oral before breakfast  polyethylene glycol 3350 17 Gram(s) Oral daily PRN  senna 2 Tablet(s) Oral at bedtime    atorvastatin 80 milliGRAM(s) Oral at bedtime  insulin glargine Injectable (LANTUS) 26 Unit(s) SubCutaneous at bedtime  insulin lispro (ADMELOG) corrective regimen sliding scale   SubCutaneous three times a day before meals  insulin lispro (ADMELOG) corrective regimen sliding scale   SubCutaneous at bedtime  insulin lispro Injectable (ADMELOG) 10 Unit(s) SubCutaneous three times a day before meals    benzocaine/menthol Lozenge 1 Lozenge Oral every 2 hours PRN  chlorhexidine 2% Cloths 1 Application(s) Topical daily  tamsulosin 0.4 milliGRAM(s) Oral at bedtime      REVIEW OF SYSTEMS:  Complete 12point ROS negative.    PHYSICAL EXAM:  T(C): 36.8 (10-30-23 @ 20:00), Max: 36.8 (10-30-23 @ 20:00)  HR: 117 (10-31-23 @ 11:00) (117 - 127)  BP: 93/52 (10-31-23 @ 07:00) (92/60 - 99/56)  RR: 22 (10-31-23 @ 11:00) (14 - 41)  SpO2: 97% (10-31-23 @ 11:00) (89% - 100%)    30 Oct 2023 07:01  -  31 Oct 2023 07:00  --------------------------------------------------------  IN: 1604.6 mL / OUT: 1400 mL / NET: 204.6 mL    31 Oct 2023 07:01  -  31 Oct 2023 11:20  --------------------------------------------------------  IN: 429.2 mL / OUT: 0 mL / NET: 429.2 mL    Appearance: Normal	  HEENT:   Normal oral mucosa, PERRL, EOMI	  Cardiovascular: Normal S1 S2, regular.  No JVD, No murmurs, No edema  Respiratory: Lungs clear to auscultation	  Psychiatry: A & O x 2, Mood & affect appropriate  Gastrointestinal:  Soft, Non-tender, + BS	  Extremities: Normal range of motion, No clubbing, cyanosis or edema  Vascular: Peripheral pulses palpable 2+ bilaterally        LABS:	 	    CBC Full  -  ( 31 Oct 2023 00:22 )  WBC Count : 7.46 K/uL  Hemoglobin : 10.2 g/dL  Hematocrit : 32.4 %  Platelet Count - Automated : 233 K/uL  Mean Cell Volume : 81.0 fl  Mean Cell Hemoglobin : 25.5 pg  Mean Cell Hemoglobin Concentration : 31.5 gm/dL  Auto Neutrophil # : x  Auto Lymphocyte # : x  Auto Monocyte # : x  Auto Eosinophil # : x  Auto Basophil # : x  Auto Neutrophil % : x  Auto Lymphocyte % : x  Auto Monocyte % : x  Auto Eosinophil % : x  Auto Basophil % : x    10-31    137  |  104  |  47<H>  ----------------------------<  125<H>  4.1   |  19<L>  |  2.11<H>  10-30    138  |  105  |  44<H>  ----------------------------<  218<H>  3.9   |  22  |  1.87<H>    Ca    8.8      31 Oct 2023 00:13  Ca    8.3<L>      30 Oct 2023 05:07  Phos  4.6     10-31  Phos  4.4     10-30  Mg     2.2     10-31  Mg     2.2     10-30    TPro  6.3  /  Alb  3.0<L>  /  TBili  0.2  /  DBili  x   /  AST  23  /  ALT  27  /  AlkPhos  67  10-31  TPro  5.7<L>  /  Alb  2.9<L>  /  TBili  0.3  /  DBili  x   /  AST  13  /  ALT  21  /  AlkPhos  55  10-30        TELEMETRY: 	  Sinus Tachycardia 120bpm     TTE:   TRANSTHORACIC ECHOCARDIOGRAM REPORT  Pt. Name:       BRANDEN MARRUFO        Study Date:    10/26/2023  MRN:            [id number]        YOB: 1965  Accession #:    [id number]         Age:           58 years  Account#:       [id number]      Gender:        M  Heart Rate:     125 bpm           Height:        72.00 in (182.88 cm)  Rhythm:         sinus tachycardia Weight:        154.00 lb (69.85 kg)  Blood Pressure: 100/65 mmHg       BSA/BMI:       1.91 m² / 20.89 kg/m²  ________________________________________________________________________________________  Referring Physician:    [id number] Jaun Junior  Interpreting Physician: Puneet Landon M.D.  Primary Sonographer:    Armond Dobbs RDCS    CPT:                ECHO TTE W CON FU LTD - .m;LIMITED SPECTRAL -                      60646.m;DEFINITY ECHO CONTRAST PER ML WASTED -                 .m;DEFINITY ECHO CONTRAST PER ML - .m  Indication(s):      Heart failure, unspecified - I50.9  Procedure:          Limited transthoracic echocardiogram.  Ordering Location:  [facility]  Contrast Injection: Verbal consent was obtained for injection of Ultrasonic                      Enhancing Agent following a discussion of risks and                      benefits.                      Endocardial visualization enhanced with 2 ml of Definity                      Ultrasound enhancing agent (Lot#:[id number] Exp.Date:12/01/2024                      Discarded Dose:8ml).  UEA Reaction:       Patient had no adverse reaction after injection of                      Ultrasound Enhancing Agent.  Study Information:  Image quality for this study is less than ideal.    CONCLUSIONS:      1. Left ventricular systolic function is severely decreased with an ejection fraction visually estimated at <20 %.   2. Systolic function.   3. LV apical thrombus is seen.      Limited study.   4. Findings were discussed with Yenni LAFLEUR on 10/26/2023 at 1.29pm.   5. Small pericardial effusion noted adjacent to the right ventricle with no evidence of hemodynamic compromise.   6. There is a left ventricular thrombus.   7. There is normal LV mass and normal geometry.    FINDINGS:     Left Ventricle:  Left ventricular systolic function is severely decreased with an ejection fraction visually estimated at <20%. There is global left ventricular hypokinesis. There is normal LV mass and normal geometry. There is a left ventricular thrombus.     Right Ventricle:  Normal systolic function. A device lead is visualized.     Aortic Valve:  The aortic valve was not well visualized.     Mitral Valve:  There is symmetric leaflet tethering. There is mild to moderate mitral regurgitation.     Pericardium:  There is a small pericardial effusion noted adjacent to the right ventricle with no evidence of hemodynamic compromise.     Pleura:  Right pleural effusion noted.    Quantitative Data:  Left Ventricle Measurements: (Indexed to BSA)     IVSd (2D):   0.7 cm  LVPWd (2D):  0.7 cm  LVIDd (2D):  5.8 cm  LV Mass:     151 g  79.0 g/m²  Visualized LV EF%: <20%    Electronically signed on 10/26/2023 at 1:35:07 PM by Puneet Landon M.D.     *** Final ***

## 2023-10-31 NOTE — PROGRESS NOTE ADULT - ASSESSMENT
59 YO M with a history of CVA with residual mild R paresthesias, second degree Mobitz II heart block s/p DC-ICD 12/2022 (initial concern for sarcoid but negative biopsy/PET post procedure), DM2 (A1c 8.4%), and HTN who was admitted to [facility] with chest pain and dyspnea, found to have NSTEMI with markedly elevated troponins and new severe LV dysfunction with regional wall motion abnormalities as well as + enterovirus. He required NIPPV and was diuresed before being transferred to [facility] where LHC performed which revealed severe 3v CAD with critical proximal LAD involvement. CTS was consulted for CABG evaluation and HF consulted for comanagement.    He ultimately underwent RHC with revealed elevated wedge but normal cardiac output. He was transferred to CICU for swan placement and closer observation of hemodynamics. Found to have LV thrombus. Given concern for low flow status thus sent back to CCU 10/28. He is currently in ST with HR in 120s, his BP is marginal on low dose milrinone. He appears euvolemic on exam. Bedside IVC POCUS suggestive of RAP of ~8. He is confused and was found to have R medial cerebellar infarct on head CT, with MRI showing evolving PICA stroke. Given evolving stroke, brain lesion and ongoing confusion he's not currently a candidate for coronary intervention. He remains in persistent ST in 120s without clear etiology. CI 2.4 this morning. Cr uptrending 2.1 from 1/87, lactate negative, CVP not reading due to clotted line but was 9-10 overnight. Will attempt to wean milrinone with close monitoring.    REVIEW OF STUDIES  TTE 10/26: EF <20% LVT present, small pericardial effusion w/o tamponade  RHC 10/19: RA 10, RV 47/9/13, Wedge 29, PA 41/26/33, CO/CI 4.4/2.3, PA sat 57.3  EKG: sinus tachycardia, septal q-waves, left axis deviation   TTE 10/16/23: LV 5.5 cm, LVEF 20% with regional WMAs worse in the apex, LVOT VTI 8 cm, normal RV size/function, severe functional MR, small pericardial effusion, estimated RA pressure 8 mmHg   TTE 12/2022: normal LVEF   LHC: 95% discrete proximal LAD disease and sequential multifocal disease with good distal target, 80-90% proximal LCx disease just prior to marginals, severe multifocal RCA disease with good targets     Hemodynamics:  10/21/23: RA 13 with V-wave 21, PA 50/33, PCW 33, MvO2 68.2, Cris CO/CI 4.4/2.56

## 2023-10-31 NOTE — PROGRESS NOTE ADULT - ATTENDING COMMENTS
59yo M with CVA DM2 s/p ICD here with shortness of breath, admitted for ADHF vs PNA and LHC on 10/16 showed 3v dz and reduced EF. RHC on 10/19 showed CVP of 10, PCWP 29 and CI of 2.3, started empirically on  weanied off and is now on milrinone     Neuro: awaiting   Pulm: volume overload  CV: c/w milrninone - will wean down and recheck numbers: once neuro work up is done will contact   Renal: not responding to diuresis despite volume overload, c/w bumex   - will discuss the case with CTS now that the viability study was completed  GI: DASH diet  ID: no abx  Heme: sqh ppx  Endo: BG controlled  Groin swan - with poor access in the neck as per the US - will d\w the cath team: ?brachial    Patient is critically ill, requiring critical care services. Despite the current condition, the patient is at a high risk of clinical and hemodynamic demise

## 2023-10-31 NOTE — PROGRESS NOTE ADULT - PROBLEM SELECTOR PLAN 2
- On ASA/statin, cMRI with no viability in LAD territory  - Deemed too high risk for CABG  - Possible PCI if an AT candidate and family wants, however now w/ an LV thrombus, limited support options, and confusion/stroke

## 2023-10-31 NOTE — PROGRESS NOTE ADULT - ASSESSMENT
57 YO M with a history of CVA with residual mild R paresthesias, second degree Mobitz II heart block s/p DC-ICD 12/2022 (initial concern for sarcoid but negative biopsy/PET post procedure), DM2 (A1c 8.4%), and HTN who was admitted to [facility] with chest pain and dyspnea, found to have NSTEMI with markedly elevated troponins and new severe LV dysfunction with regional wall motion abnormalities, admitted to CICU for low output state on milrinone, now with a. tach.  CT head 10/27 found possible R cerebellar infarct, patient re-transferred to CICU for inotropic support.  S/P [place] placement. Confirmed with [employer]  that patient is in 1:1 tachycardia likely Sinus Tachycardia.      1. Sinus Tachycardia   2. Hypotension/cardiogenic shock, in setting of acute on chronic HFrEF  3. Hx of CVA w/ mild L residual deficit, Encephalopathy with L Parietal and R PICA infarct/ embolic shower  4. TTE 10/26: EF <20 %, LV apical thrombus is seen, Small pericardial effusion noted adjacent to the right ventricle with no evidence of hemodynamic compromise.  5. 3 Vessel CAD     - Tele review remains in Sinus Tachycardia rate 120's   - MR head 10/30 with R PICA infarct and L parietal infarct, likely due to embolic shower, per neuro. Very mild hemorrhagic transformation in R PICA distribution   - MR neck showing occluded R-sided intracranial vertebral artery of unknown timeframe and mild focal stenosis of the R supraclinoid intracranial internal   carotid artery secondary to atherosclerosis  - Per Neuro no objection to heparin drip for LV thrombus   - Advanced therapies: severe LV dysfunction with tachycardia and poor non-invasive hemodynamics, currently VAD/transplant eval on hold given cognitive issues  - cMRI with no viability in LAD territory.  Deemed too high risk for CABG.  [facility] PCI[facility]        EMERALD Barboza [facility]  [phone]

## 2023-10-31 NOTE — PROGRESS NOTE ADULT - SUBJECTIVE AND OBJECTIVE BOX
Neurology        S: patient seen and examined. MRI confirms embolic infarcts           Medications: MEDICATIONS  (STANDING):  aspirin enteric coated 81 milliGRAM(s) Oral daily  atorvastatin 80 milliGRAM(s) Oral at bedtime  buMETAnide 2 milliGRAM(s) Oral daily  chlorhexidine 2% Cloths 1 Application(s) Topical daily  digoxin     Tablet 125 MICROGram(s) Oral daily  heparin  Infusion 1200 Unit(s)/Hr (12 mL/Hr) IV Continuous <Continuous>  insulin glargine Injectable (LANTUS) 26 Unit(s) SubCutaneous at bedtime  insulin lispro (ADMELOG) corrective regimen sliding scale   SubCutaneous at bedtime  insulin lispro (ADMELOG) corrective regimen sliding scale   SubCutaneous three times a day before meals  insulin lispro Injectable (ADMELOG) 10 Unit(s) SubCutaneous three times a day before meals  milrinone Infusion 0.25 MICROgram(s)/kG/Min (5.25 mL/Hr) IV Continuous <Continuous>  pantoprazole    Tablet 40 milliGRAM(s) Oral before breakfast  senna 2 Tablet(s) Oral at bedtime  tamsulosin 0.4 milliGRAM(s) Oral at bedtime    MEDICATIONS  (PRN):  benzocaine/menthol Lozenge 1 Lozenge Oral every 2 hours PRN Sore Throat  polyethylene glycol 3350 17 Gram(s) Oral daily PRN Constipation       Vitals:  Vital Signs Last 24 Hrs  T(C): 36.8 (30 Oct 2023 20:00), Max: 36.8 (30 Oct 2023 20:00)  T(F): 98.3 (30 Oct 2023 20:00), Max: 98.3 (30 Oct 2023 20:00)  HR: 118 (31 Oct 2023 10:00) (118 - 127)  BP: 93/52 (31 Oct 2023 07:00) (92/60 - 99/56)  BP(mean): 66 (31 Oct 2023 07:00) (66 - 72)  RR: 18 (31 Oct 2023 10:00) (14 - 41)  SpO2: 100% (31 Oct 2023 10:00) (89% - 100%)    Parameters below as of 31 Oct 2023 10:00  Patient On (Oxygen Delivery Method): room air                   General Exam:   General Appearance: Appropriately dressed and in no acute distress       Head: Normocephalic, atraumatic and no dysmorphic features  Ear, Nose, and Throat: Moist mucous membranes  CVS: S1S2+  Resp: No SOB, no wheeze or rhonchi  GI: soft NT/ND  Extremities: No edema or cyanosis  Skin: No bruises or rashes     Neurological Exam:  Mental Status: Awake, alert and oriented x 2.  Able to follow simple verbal commands. Able to name and repeat. fluent speech. No obvious aphasia or dysarthria noted. poor insight. not understanding why he is in hospital   Cranial Nerves: PERRL, EOMI, VFFC, sensation V1-V3 intact,  no obvious facial asymmetry, equal elevation of palate, scm/trap 5/5, tongue is midline on protrusion. no obvious papilledema on fundoscopic exam. hearing is grossly intact.   Motor: Normal bulk, tone and strength throughout. Fine finger movements were intact and symmetric. no tremors or drift noted.    Sensation: Intact to light touch and pinprick throughout. no right/left confusion. no extinction to tactile on DSS. Romberg was negative.   Reflexes: 1+ throughout at biceps, brachioradialis, triceps, patellars and ankles bilaterally and equal. No clonus. R toe and L toe were both downgoing.  Coordination: No dysmetria on FNF or HKS  Gait:   no limitations in gait.     Data/Labs/Imaging which I personally reviewed.     Labs:       LABS:                          10.2   7.46  )-----------( 233      ( 31 Oct 2023 00:22 )             32.4     10-31    137  |  104  |  47<H>  ----------------------------<  125<H>  4.1   |  19<L>  |  2.11<H>    Ca    8.8      31 Oct 2023 00:13  Phos  4.6     10-31  Mg     2.2     10-31    TPro  6.3  /  Alb  3.0<L>  /  TBili  0.2  /  DBili  x   /  AST  23  /  ALT  27  /  AlkPhos  67  10-31    LIVER FUNCTIONS - ( 31 Oct 2023 00:13 )  Alb: 3.0 g/dL / Pro: 6.3 g/dL / ALK PHOS: 67 U/L / ALT: 27 U/L / AST: 23 U/L / GGT: x           PT/INR - ( 30 Oct 2023 05:08 )   PT: 12.9 sec;   INR: 1.18 ratio         PTT - ( 31 Oct 2023 06:55 )  PTT:68.1 sec  Urinalysis Basic - ( 31 Oct 2023 00:13 )    Color: x / Appearance: x / SG: x / pH: x  Gluc: 125 mg/dL / Ketone: x  / Bili: x / Urobili: x   Blood: x / Protein: x / Nitrite: x   Leuk Esterase: x / RBC: x / WBC x   Sq Epi: x / Non Sq Epi: x / Bacteria: x               < from: CT Head No Cont (10.27.23 @ 18:04) >    ACC: [id number] EXAM:  CT BRAIN   ORDERED BY: BRETT HOPSON     PROCEDURE DATE:  10/27/2023          INTERPRETATION:  Clinical Indication: CVA    5mm axial sections of the brain were obtained from base to vertex,   without the intravenous administration of contrast material. Coronal and   sagittal computer generated reconstructed views are available.    No prior brain imaging is available for comparison.          The ventricles and sulci are prominent consistent with mild atrophy.   Small vesselwhite matter ischemic changes are noted. There is a infarct   in the right medial cerebellum of undetermined age which could be acute   to subacute based on its degree of lucency. There is no significant   hemorrhage. Bone window examination is unremarkable. There is been   previous right-sided lens replacement surgery.      IMPRESSION: Atrophy and small vessel white matter ischemic changes. Right   medial cerebellar infarct may be acute versus subacute. No hemorrhage.      --- End of Report ---    < from: MR Angio Head No Cont (10.30.23 @ 20:58) >    ACC: [id number] EXAM:  MR ANGIO BRAIN   ORDERED BY: NATALIE CARRANZA     ACC: [id number] EXAM:  MR ANGIO NECK   ORDERED BY: NATALIE CARRANZA     ACC: [id number] EXAM:  MR BRAIN   ORDERED BY: NAVNEET CORONADO     PROCEDURE DATE:  10/30/2023          INTERPRETATION:  .    CLINICAL INFORMATION: Stroke.    TECHNIQUE: Multiplanar multi sequential MRI examination of the brain was   performed without the administration of IV gadolinium. MRA images through   the neck and [place] of [place] were obtained usinga combination of 2-D   and 3-D time-of-flight acquisition. The data was then reformatted into a   volumetric data set and reviewed as rotational MIP images.    COMPARISON: Most recent prior CT examination of the head from 10/27/2023.   No prior MRI studies are available for comparison.    FINDINGS:    MRI Brain: A wedge-shaped area of diffusion restriction is seen within   the right PICA territory. Associated T2/FLAIR hyperintense signal is also   seen, compatible with cytotoxic edema. Mild hemorrhagic transformation is   seen within the infarct bed manifested by high signal on T1-weighted   imaging as well as susceptibility artifact on the SWI sequence. Mild mass   effect is seen without effacement of the fourth ventricle or shift of the   posterior fossa midline structures.    This is superimposed upon encephalomalacia and gliosis involving the   right cerebellar hemisphere.    An additional vague area of diffusion reduction is seen within the left   parietal periventricular white matter without associated T2/FLAIR   hyperintense signal, compatible with cytotoxic edema. No gross   hemorrhagic transformation is noted in this location.    Scattered foci of susceptibility artifact are seen within the bilateral   basal ganglia, compatible with areas of chronic microhemorrhage.    Multiple additional patchy confluent nonspecific T2/FLAIR hyperintense   signal changes are noted throughout the bihemispheric white matter   without associated mass effect or restricted diffusion.    Ventricular size and configuration is unremarkable. No abnormal   extra-axial fluid collections are seen. Flow-voids are noted throughout   the major intracranial vessels, on the T2 weighted images, consistent   with their patency. The sellar location appears unremarkable. There is an   unchanged appearing small retrocerebellar arachnoid cyst versus cisterna   magna.    A polyp versus retention cyst is seen within the right maxillary sinus.   Additional minor scattered mucosal thickening seen throughout the ethmoid   labyrinth. The tympanomastoid cavities are clear. The left orbit appears   within normal limits. There is evidence of right-sided cataract removal.    There is an indeterminate mixed signal ovoid shaped soft tissue focus   involving the left superior pinna measuring 1.4 x 0.9 cm which appears   unchanged.    MRA Neck: Examination is motion limited.    There is a standard anatomic variation to the aortic arch. The origins of   the great vessels appear grossly unremarkable.    The bilateral common carotid arteries, carotid bifurcations and cervical   internal carotid arteries appear patent. The origin of the left vertebral   artery is normal. The left vertebral artery is patent.    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    MRA [place] of [place]: Atherosclerosis affects the bilateral carotid   siphons with mild area of focal stenosis involving the right   clinoid/supraclinoid junction. There is mild hypoplasia of the right A1   segment. Otherwise, the bilateral intracranial internal carotid,   anterior, and middle cerebral arteries appear unremarkable.    The anterior and bilateral posterior communicating arteries are seen.    The right V4 segment does not demonstrate flow relatedsignal. The left   V4 segment appears unremarkable. The vertebrobasilar confluence appears   unremarkable. Long segment mild stenosis involving the mid basilar   artery. The basilar tip appears unremarkable as well as the bilateral   posterior cerebral arteries.    IMPRESSION:    MRI BRAIN:  1. Evolving acute/subacute right-sided PICA distribution infarction with   associated cytotoxic edema and very mild hemorrhagic transformation.  2. Additional wedge-shaped area of acute/subacute ischemia within the   left parietal periatrial white matter with associated cytotoxic edema.  3. Indeterminate soft tissue lesion involving the left superior pinna   measuring 1.4 x 0.9 cm. Neoplasm cannot be excluded. Recommend   correlation with direct physical examination and visualization.    MRA NECK:  1. Extremely motion limited study.  2. 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 Recommend further evaluation with a CT angiogram study   of the neck.    MRA HEAD:  1. 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 This can be further evaluated with a CT angiogram study of the   head.  2. Mild focal stenosis of the right supraclinoid intracranial internal   carotid artery secondary to atherosclerosis.  3. Otherwise, no large vessel occlusion or major stenosis.    --- End of Report ---            LAKESHA DIAZ MD; Attending Radiologist  This document has been electronically signed. Oct 30 2023  9:20PM    < end of copied text >

## 2023-10-31 NOTE — PROGRESS NOTE ADULT - SUBJECTIVE AND OBJECTIVE BOX
Patient is a 58y old  Male who presents with a chief complaint of NSTEMI (28 Oct 2023 19:24)    HPI:  Patient with separate chart from [facility], MRN# [id number]    57 yo Male pmhx CVA with mild left sided deficits, HTN, DM2, vertigo, HLD, Mobitz II s/pp Medtronic dual chamber ICD (22) initially presented to [facility] from home with complaints of dyspnea and chest pain and was admitted w/ acute hypoxic respiratory failure likely due to acute pulmonary edema due to acute HFrEF in setting of acute NSTEMI, LAURA and enterovirus infection. Pt with brief MICU stay at [facility] requiring bipap (no intubation), was treated for PNA with cefepime, and was found to have TTE done 23 showing EF 20% with severely decreased LVSF, multiple regional wall motion abnormalities, and elevated LV end diastolic pressure. Pt was transferred to [facility] for cardiac cath evaluation. Patient without any acute complaints, complaining of intermittent palpitations for the last 2 days. No chest pain, SOB, fevers, nausea, vomiting, abdominal pain.  (13 Oct 2023 21:05)       INTERVAL HPI/OVERNIGHT EVENTS:   No overnight events. Pending possible central line placement in cath lab. Afebrile, hemodynamically stable, persistently tachycardic.    Patient seen and examined at bedside. Pt stated he is feeling well. A&Ox2-3 with a lot of prompting (knows name, thought at [facility], we are in end of october). Thought it was nighttime. No acute complaints, denies cp/sob/dizziness/palpitations.    ICU Vital Signs Last 24 Hrs  T(C): 36.8 (30 Oct 2023 20:00), Max: 36.9 (30 Oct 2023 08:00)  T(F): 98.3 (30 Oct 2023 20:00), Max: 98.5 (30 Oct 2023 08:00)  HR: 120 (31 Oct 2023 06:00) (119 - 127)  BP: 99/56 (30 Oct 2023 21:00) (92/60 - 109/68)  BP(mean): 70 (30 Oct 2023 21:00) (70 - 85)  ABP: 102/60 (31 Oct 2023 06:00) (38/36 - 122/72)  ABP(mean): 74 (31 Oct 2023 06:00) (38 - 85)  RR: 28 (31 Oct 2023 06:00) (14 - 41)  SpO2: 94% (31 Oct 2023 06:00) (89% - 99%)    O2 Parameters below as of 31 Oct 2023 04:00  Patient On (Oxygen Delivery Method): nasal cannula  O2 Flow (L/min): 2    I&O's Summary    30 Oct 2023 07:01  -  31 Oct 2023 07:00  --------------------------------------------------------  IN: 1587.3 mL / OUT: 1400 mL / NET: 187.3 mL      Daily     Daily Weight in k.5 (28 Oct 2023 12:15)      EKG/Telemetry Events:    MEDICATIONS  (STANDING):  aspirin enteric coated 81 milliGRAM(s) Oral daily  atorvastatin 80 milliGRAM(s) Oral at bedtime  buMETAnide 2 milliGRAM(s) Oral daily  chlorhexidine 2% Cloths 1 Application(s) Topical daily  dextrose 5%. 1000 milliLiter(s) (50 mL/Hr) IV Continuous <Continuous>  dextrose 5%. 1000 milliLiter(s) (100 mL/Hr) IV Continuous <Continuous>  digoxin     Tablet 125 MICROGram(s) Oral daily  heparin  Infusion 1200 Unit(s)/Hr (12 mL/Hr) IV Continuous <Continuous>  insulin glargine Injectable (LANTUS) 24 Unit(s) SubCutaneous at bedtime  insulin lispro (ADMELOG) corrective regimen sliding scale   SubCutaneous three times a day before meals  insulin lispro (ADMELOG) corrective regimen sliding scale   SubCutaneous at bedtime  insulin lispro Injectable (ADMELOG) 8 Unit(s) SubCutaneous three times a day before meals  milrinone Infusion 0.125 MICROgram(s)/kG/Min (2.63 mL/Hr) IV Continuous <Continuous>  pantoprazole    Tablet 40 milliGRAM(s) Oral before breakfast  senna 2 Tablet(s) Oral at bedtime  tamsulosin 0.4 milliGRAM(s) Oral at bedtime    MEDICATIONS  (PRN):  benzocaine/menthol Lozenge 1 Lozenge Oral every 2 hours PRN Sore Throat  polyethylene glycol 3350 17 Gram(s) Oral daily PRN Constipation      PHYSICAL EXAM:  GENERAL: AAOx4 NAD  HEAD:  Atraumatic, Normocephalic  EYES: EOMI, PERRLA, conjunctiva and sclera clear  NECK: Supple, No JVD, Normal thyroid, no enlarged nodes  NERVOUS SYSTEM: Strength 5/5 throughout intact finger to nose  CHEST/LUNG: B/L good air entry; No rales, rhonchi, or wheezing  HEART: S1S2 normal, no S3, Regular rate and rhythm; No murmurs  ABDOMEN: Soft, Nontender, Nondistended; Bowel sounds present  EXTREMITIES:  2+ Peripheral Pulses, No clubbing, cyanosis, or edema  LYMPH: No lymphadenopathy noted  SKIN: No rashes or lesions    LABS:                    PTT - ( 29 Oct 2023 00:03 )  PTT:56.0 sec  CAPILLARY BLOOD GLUCOSE      POCT Blood Glucose.: 200 mg/dL (28 Oct 2023 21:15)  POCT Blood Glucose.: 324 mg/dL (28 Oct 2023 17:00)  POCT Blood Glucose.: 162 mg/dL (28 Oct 2023 11:53)            Urinalysis Basic - ( 29 Oct 2023 05:42 )    Color: x / Appearance: x / SG: x / pH: x  Gluc: 216 mg/dL / Ketone: x  / Bili: x / Urobili: x   Blood: x / Protein: x / Nitrite: x   Leuk Esterase: x / RBC: x / WBC x   Sq Epi: x / Non Sq Epi: x / Bacteria: x          RADIOLOGY & ADDITIONAL TESTS:  CXR:    < from: Xray Chest 2 Views PA/Lat (10.30.23 @ 09:42) >  INTERPRETATION:  EXAM: XR CHEST PA AND LATERAL    INDICATION: Admitting Dxs: R07.9 CHEST PAIN, UNSPECIFIED ICD Check   Pre-MRI MXR    COMPARISON:  at 8:32 AM    IMPRESSION: Limited exam as noted by the technologist. Increased   perihilar interstitial markings left greater than right. Low left greater   than right effusion with some compressive atelectatic change. Borderline   cardiomegaly. Pacer. Regional osseous structures appropriate for age.    --- End of Report ---      < end of copied text >        Care Discussed with Consultants/Other Providers [ x] YES  [ ] NO           Patient is a 58y old  Male who presents with a chief complaint of NSTEMI (28 Oct 2023 19:24)    HPI:  Patient with separate chart from [facility], MRN# [id number]    59 yo Male pmhx CVA with mild left sided deficits, HTN, DM2, vertigo, HLD, Mobitz II s/pp Medtronic dual chamber ICD (22) initially presented to [facility] from home with complaints of dyspnea and chest pain and was admitted w/ acute hypoxic respiratory failure likely due to acute pulmonary edema due to acute HFrEF in setting of acute NSTEMI, LAURA and enterovirus infection. Pt with brief MICU stay at [facility] requiring bipap (no intubation), was treated for PNA with cefepime, and was found to have TTE done 23 showing EF 20% with severely decreased LVSF, multiple regional wall motion abnormalities, and elevated LV end diastolic pressure. Pt was transferred to [facility] for cardiac cath evaluation. Patient without any acute complaints, complaining of intermittent palpitations for the last 2 days. No chest pain, SOB, fevers, nausea, vomiting, abdominal pain.  (13 Oct 2023 21:05)       INTERVAL HPI/OVERNIGHT EVENTS:   Pt had R groin swan placed yesterday. ON, he was diuresed with bumex 2 with good UO. Pt had MRI which showed very mild hemorrhagic component to stroke, however repeat CT showed no hemorrhage so per neuro, heparin gtt was ok to continue.    Patient seen and examined at bedside. Pt stated he is feeling well. A&Ox2-3 with a lot of prompting (knows name, knows in hospital, thought it was November, did not know year).  No acute complaints, denies headache/ cp/sob/dizziness/palpitations.    ICU Vital Signs Last 24 Hrs  T(C): 36.8 (30 Oct 2023 20:00), Max: 36.9 (30 Oct 2023 08:00)  T(F): 98.3 (30 Oct 2023 20:00), Max: 98.5 (30 Oct 2023 08:00)  HR: 120 (31 Oct 2023 06:00) (119 - 127)  BP: 99/56 (30 Oct 2023 21:00) (92/60 - 109/68)  BP(mean): 70 (30 Oct 2023 21:00) (70 - 85)  ABP: 102/60 (31 Oct 2023 06:00) (38/36 - 122/72)  ABP(mean): 74 (31 Oct 2023 06:00) (38 - 85)  RR: 28 (31 Oct 2023 06:00) (14 - 41)  SpO2: 94% (31 Oct 2023 06:00) (89% - 99%)    O2 Parameters below as of 31 Oct 2023 04:00  Patient On (Oxygen Delivery Method): nasal cannula  O2 Flow (L/min): 2    I&O's Summary    30 Oct 2023 07:01  -  31 Oct 2023 07:00  --------------------------------------------------------  IN: 1587.3 mL / OUT: 1400 mL / NET: 187.3 mL      Daily     Daily Weight in k.5 (28 Oct 2023 12:15)      EKG/Telemetry Events:    MEDICATIONS  (STANDING):  aspirin enteric coated 81 milliGRAM(s) Oral daily  atorvastatin 80 milliGRAM(s) Oral at bedtime  buMETAnide 2 milliGRAM(s) Oral daily  chlorhexidine 2% Cloths 1 Application(s) Topical daily  dextrose 5%. 1000 milliLiter(s) (50 mL/Hr) IV Continuous <Continuous>  dextrose 5%. 1000 milliLiter(s) (100 mL/Hr) IV Continuous <Continuous>  digoxin     Tablet 125 MICROGram(s) Oral daily  heparin  Infusion 1200 Unit(s)/Hr (12 mL/Hr) IV Continuous <Continuous>  insulin glargine Injectable (LANTUS) 24 Unit(s) SubCutaneous at bedtime  insulin lispro (ADMELOG) corrective regimen sliding scale   SubCutaneous three times a day before meals  insulin lispro (ADMELOG) corrective regimen sliding scale   SubCutaneous at bedtime  insulin lispro Injectable (ADMELOG) 8 Unit(s) SubCutaneous three times a day before meals  milrinone Infusion 0.125 MICROgram(s)/kG/Min (2.63 mL/Hr) IV Continuous <Continuous>  pantoprazole    Tablet 40 milliGRAM(s) Oral before breakfast  senna 2 Tablet(s) Oral at bedtime  tamsulosin 0.4 milliGRAM(s) Oral at bedtime    MEDICATIONS  (PRN):  benzocaine/menthol Lozenge 1 Lozenge Oral every 2 hours PRN Sore Throat  polyethylene glycol 3350 17 Gram(s) Oral daily PRN Constipation      PHYSICAL EXAM:  GENERAL: AAOx4 NAD  HEAD:  Atraumatic, Normocephalic  EYES: EOMI, PERRLA, conjunctiva and sclera clear  NECK: Supple, No JVD, Normal thyroid, no enlarged nodes  NERVOUS SYSTEM: Strength 5/5 throughout intact finger to nose  CHEST/LUNG: B/L good air entry; No rales, rhonchi, or wheezing  HEART: S1S2 normal, no S3, Regular rate and rhythm; No murmurs  ABDOMEN: Soft, Nontender, Nondistended; Bowel sounds present  EXTREMITIES:  2+ Peripheral Pulses, No clubbing, cyanosis, or edema  LYMPH: No lymphadenopathy noted  SKIN: No rashes or lesions    LABS:             CBC Full  -  ( 31 Oct 2023 00:22 )  WBC Count : 7.46 K/uL  RBC Count : 4.00 M/uL  Hemoglobin : 10.2 g/dL  Hematocrit : 32.4 %  Platelet Count - Automated : 233 K/uL  Mean Cell Volume : 81.0 fl  Mean Cell Hemoglobin : 25.5 pg  Mean Cell Hemoglobin Concentration : 31.5 gm/dL  Auto Neutrophil # : x  Auto Lymphocyte # : x  Auto Monocyte # : x  Auto Eosinophil # : x  Auto Basophil # : x  Auto Neutrophil % : x  Auto Lymphocyte % : x  Auto Monocyte % : x  Auto Eosinophil % : x  Auto Basophil % : x    10-31    137  |  104  |  47<H>  ----------------------------<  125<H>  4.1   |  19<L>  |  2.11<H>    Ca    8.8      31 Oct 2023 00:13  Phos  4.6     10-  Mg     2.2     10-31    TPro  6.3  /  Alb  3.0<L>  /  TBili  0.2  /  DBili  x   /  AST  23  /  ALT  27  /  AlkPhos  67  10-         PTT - ( 29 Oct 2023 00:03 )  PTT:56.0 sec  CAPILLARY BLOOD GLUCOSE      POCT Blood Glucose.: 200 mg/dL (28 Oct 2023 21:15)  POCT Blood Glucose.: 324 mg/dL (28 Oct 2023 17:00)  POCT Blood Glucose.: 162 mg/dL (28 Oct 2023 11:53)            Urinalysis Basic - ( 29 Oct 2023 05:42 )    Color: x / Appearance: x / SG: x / pH: x  Gluc: 216 mg/dL / Ketone: x  / Bili: x / Urobili: x   Blood: x / Protein: x / Nitrite: x   Leuk Esterase: x / RBC: x / WBC x   Sq Epi: x / Non Sq Epi: x / Bacteria: x          RADIOLOGY & ADDITIONAL TESTS:    < from: MR Angio Neck No Cont (10.30.23 @ 20:59) >  IMPRESSION:    MRI BRAIN:  1. Evolving acute/subacute right-sided PICA distribution infarction with   associated cytotoxic edema and very mild hemorrhagic transformation.  2. Additional wedge-shaped area of acute/subacute ischemia within the   left parietal periatrial white matter with associated cytotoxic edema.  3. Indeterminate soft tissue lesion involving the left superior pinna   measuring 1.4 x 0.9 cm. Neoplasm cannot be excluded. Recommend   correlation with direct physical examination and visualization.    MRA NECK:  1. Extremely motion limited study.  2. 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 Recommend further evaluation with a CT angiogram study   of the neck.    MRA HEAD:  1. 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 This can be further evaluated with a CT angiogram study of the   head.  2. Mild focal stenosis of the right supraclinoid intracranial internal   carotid artery secondary to atherosclerosis.  3. Otherwise, no large vessel occlusion or major stenosis.    --- End of Report ---    < end of copied text >      Care Discussed with Consultants/Other Providers [ x] YES  [ ] NO

## 2023-10-31 NOTE — PROGRESS NOTE ADULT - NS ATTEND AMEND GEN_ALL_CORE FT
57 y/o M wit h/o CVA with residual mild R paresthesias, DM, second degree AV block s/p PPM, HTN admitted to [facility] with NSTEMI and new onset LV dysfunction. LHC showed triple vessel disease. HF consulted for management of heart failure and evaluation for advanced therapies as bailout strategy prior to revascularization.     TTE done 10/26 showed (my review) LVEF 20-25%, LVIDD 5.6 cm, no significant MR, RV normal size and function, IVC dilated and not collapsing, LV apical thrombus. CMR on 10/18 showed: greater than 50% LGE in mid anteroseptum, anterior and anterolateral segments concerning for no viability of LAD territory.     His course has been complicated by concern for low output; he was stated on milrinone and his GDMT held due to hypotension/ LAURA. Also AMS with CT showing cerebellar infarct of indeterminate age. He is going for MRI brain today for further investigation. Tachycardia which was initially thought to be AT but ruled it out, so deferred EMILEE/DCCV.     He had an MRI brain that showed evolving PICA stroke. He is overloaded on exam.      Decrease milrinone to 0.125 mcg/kg/min  S/p PA catheter today, done via the groin due to R IJ thrombus   Bumex 2 mg daily   CT surgery deemed the patient not a candidate for CABG given lack of viability in LAD territory; he would not benefit from the procedure. Interventionalist willing to offer PCI if patient considered to be a candidate for AT. However, he wouldn't be candidate at the moment for Impella assisted intervention given LV apical thrombus  Mental status and poor cognition are an ongoing issue. He has an ongoing PICA stroke. He is being followed by psychology and neurology, pending final assessment

## 2023-10-31 NOTE — PROGRESS NOTE ADULT - SUBJECTIVE AND OBJECTIVE BOX
BRANDEN MARRUFO  MRN-[id number]  Patient is a 58y old  Male who presents with a chief complaint of NSTEMI (31 Oct 2023 17:36)    HPI:  Patient with separate chart from [facility], MRN# [id number]    59 yo Male pmhx CVA with mild left sided deficits, HTN, DM2, vertigo, HLD, Mobitz II s/pp Medtronic dual chamber ICD (12/21/22) initially presented to [facility] from home with complaints of dyspnea and chest pain and was admitted w/ acute hypoxic respiratory failure likely due to acute pulmonary edema due to acute HFrEF in setting of acute NSTEMI, LAURA and enterovirus infection. Pt with brief MICU stay at [facility] requiring bipap (no intubation), was treated for PNA with cefepime, and was found to have TTE done 9/26/23 showing EF 20% with severely decreased LVSF, multiple regional wall motion abnormalities, and elevated LV end diastolic pressure. Pt was transferred to [facility] for cardiac cath evaluation. Patient without any acute complaints, complaining of intermittent palpitations for the last 2 days. No chest pain, SOB, fevers, nausea, vomiting, abdominal pain.  (13 Oct 2023 21:05)    24 hours: pt with wean of milrinone to .125    REVIEW OF SYSTEMS:    CONSTITUTIONAL: No weakness, fevers or chills  EYES/ENT: No visual changes;  No vertigo or throat pain   NECK: No pain or stiffness  RESPIRATORY: No cough, wheezing, hemoptysis; No shortness of breath  CARDIOVASCULAR: No chest pain or palpitations  GASTROINTESTINAL: No abdominal or epigastric pain. No nausea, vomiting, or hematemesis; No diarrhea or constipation. No melena or hematochezia.  GENITOURINARY: No dysuria, frequency or hematuria  NEUROLOGICAL: No numbness or weakness  SKIN: No itching, rashes      Physical Exam:  Vital Signs Last 24 Hrs  T(C): 37.1 (31 Oct 2023 20:00), Max: 37.1 (31 Oct 2023 20:00)  T(F): 98.7 (31 Oct 2023 20:00), Max: 98.7 (31 Oct 2023 20:00)  HR: 114 (31 Oct 2023 20:00) (114 - 125)  BP: 93/52 (31 Oct 2023 07:00) (93/52 - 93/52)  BP(mean): 66 (31 Oct 2023 07:00) (66 - 66)  RR: 21 (31 Oct 2023 20:00) (15 - 32)  SpO2: 100% (31 Oct 2023 20:00) (89% - 100%)    Parameters below as of 31 Oct 2023 20:00  Patient On (Oxygen Delivery Method): room air      ============================I/O===========================   I&O's Detail    30 Oct 2023 07:01  -  31 Oct 2023 07:00  --------------------------------------------------------  IN:    Heparin: 248 mL    Milrinone: 116.6 mL    Oral Fluid: 1240 mL  Total IN: 1604.6 mL    OUT:    Voided (mL): 1400 mL  Total OUT: 1400 mL    Total NET: 204.6 mL      31 Oct 2023 07:01  -  31 Oct 2023 21:15  --------------------------------------------------------  IN:    Heparin: 168 mL    Milrinone: 26.5 mL    Milrinone: 23.4 mL    Oral Fluid: 860 mL  Total IN: 1077.9 mL    OUT:    Voided (mL): 325 mL  Total OUT: 325 mL    Total NET: 752.9 mL        ============================ LABS =========================                        10.4   8.38  )-----------( 140      ( 31 Oct 2023 15:22 )             33.7     10-31    136  |  103  |  41<H>  ----------------------------<  176<H>  4.2   |  20<L>  |  2.00<H>    Ca    8.6      31 Oct 2023 15:22  Phos  4.7     10-31  Mg     2.0     10-31    TPro  5.9<L>  /  Alb  2.9<L>  /  TBili  0.2  /  DBili  x   /  AST  19  /  ALT  28  /  AlkPhos  65  10-31    LIVER FUNCTIONS - ( 31 Oct 2023 15:22 )  Alb: 2.9 g/dL / Pro: 5.9 g/dL / ALK PHOS: 65 U/L / ALT: 28 U/L / AST: 19 U/L / GGT: x           PT/INR - ( 30 Oct 2023 05:08 )   PT: 12.9 sec;   INR: 1.18 ratio         PTT - ( 31 Oct 2023 12:40 )  PTT:68.8 sec  ABG - ( 31 Oct 2023 00:04 )  pH, Arterial: 7.45  pH, Blood: x     /  pCO2: 30    /  pO2: 78    / HCO3: 21    / Base Excess: -2.3  /  SaO2: 97.3              Urinalysis Basic - ( 31 Oct 2023 15:22 )    Color: x / Appearance: x / SG: x / pH: x  Gluc: 176 mg/dL / Ketone: x  / Bili: x / Urobili: x   Blood: x / Protein: x / Nitrite: x   Leuk Esterase: x / RBC: x / WBC x   Sq Epi: x / Non Sq Epi: x / Bacteria: x      ======================Micro/Rad/Cardio=================  Culture: Reviewed   CXR: Reviewed  Echo:Reviewed  ======================================================  PAST MEDICAL & SURGICAL HISTORY:  CVA (cerebrovascular accident)      T2DM (type 2 diabetes mellitus)      HTN (hypertension)      HLD (hyperlipidemia)      S/P cystoscopy      S/P hernia repair        ====================ASSESSMENT ==============  59 yo Male pmhx CVA with mild left sided deficits, HTN, DM2, vertigo, HLD, Mobitz II s/pp Medtronic dual chamber ICD (12/21/22) initially presented to OSH for SOB c/f ADHF vs. PNA, later found to have NSTEMI transferred to [facility] for C evaluation. 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 Admitted to CICU for hypotension, inotropic support, hemodynamic monitoring. Previously downgraded on 10/22, RRT called while on the floor for AMS and hypotensive to 80/40, CTH 10/27 found possible R cerebellar infarct, patient re-transferred to CICU for inotropic support.    NEUROLOGICAL  #L Parietal and R PICA infarct/ embolic shower  #Encephalopathy   #Hx of CVA w/ mild L residual deficit  - currently A&Ox2-3  - MR head 10/30 w/ R PICA infarct and L parietal infarct, likely due to embolic shower, per neuro. Very mild hemorrhagic transformation in R PICA distribution   - MR neck showing occluded R-sided intracranial vertebral artery of unknown timeframe and mild focal stenosis of the R supraclinoid intracranial internal   carotid artery secondary to atherosclerosis  - per neuro, c/w heparin gtt as repeat CT head w/ no hemorrhage  - CT head 10/27 w/ atrophy and small vessel white matter ischemic changes  - neurology following  - B12, TSH, RPR wnl  - neuro checks    CARDIOVASCULAR  #Hypotension/cardiogenic shock, iso acute on chronic HFrEF  TTE 10/16: EF 20%, severely reduced LVSF  LHC 10/16: 95% pLAD, 80% mid and distal LCx, 90% mid and distal RCA  RHC 10/19: RA 10, PCWP 20, CO 4.4, CI 2.3  cMR 10/18: limited viability in LAD territory  TTE 10/26: EF <20 %, LV apical thrombus is seen. Small pericardial effusion noted adjacent to the right ventricle with no evidence of hemodynamic compromise.  -R groin swan placed 10/30: RA 14/13/12, RV 51/14, PA 47/34/39, PCW 34/36/32, CI 2.5/CO 4.95   - RRT called for 80/40 while on the floor, milrinone 0.125 restarted on 10/28  - trend lactate, proBNP  - c/w bumex 2 mg PO qd for preload  - presently net positive, consider further bumex as BP and Cr allows  - hydral 10 initiated for AL reduction      #chronic HFrEF  - pending HF recs regarding high risk PCI vs further intervention; EP recommended no indication for upgrade at this time. HF holding off on launching VAD/transplant eval  - hold Entresto i/s/o hypotension  - s/p dig load, dig level 1.4, acceptable per HF   - f/u device interrogation  - avoid BB/CCB given borderline cardiac function  - start GDMT when appropriate    #LV apical thrombus  -c/w heparin gtt  -MRI brain and MRA H/N showing 2 infarcts  -neuro checks  -not a candidate for impella assisted PCI    #Tachycardia  Mobitz II s/p AICD  - Sinus tach vs. atach vs. aflutter. Iso increased cardiac demand/ hypoxia vs. iso infxn?  - per EP, appears sinus tach, no indication for cardioversion/EMILEE  - s/p dig load, dig level 1.4, acceptable per HF   - per EP, no need to upgrade device at this time    #HLD  - c/w Atorv 80mg     RESPIRATORY  #Pulmonary edema  Likely iso CHF vs. superimposed PNA  CT chest 10/28: scattered patchy and nodular bilateral upper lobe predominant groundglass opacities with interlobular septal thickening representing pulmonary edema  -c/w Bumex 2 qd   -infectious cause not excluded, low threshold to add abx if leukocytosis or febrile, as pt persistently tachy  -f/u noncontrast CT chest in 1-3 month to ensure resolution    GI/NUTRITION  No active issues  - CC DASH Halal diet  - GI ppx: PPI 40mg daily  - c/w Bowel regimen    /RENAL  #LAURA:   - sCr on admission 1.28, uptrending today, likely prerenal iso diuresis   -consider urine lytes  -Trend sCr  -Monitor I/O  -c/w diuresis    #Elevated lactate - Improving  - uptrending lactate likely iso cardiogenic shock  - now at normal range  - c/w Milrinone, wean as tolerated  - continue to trend    #BPH  - c/w Flomax 0.4mg qhs    SKIN  - no active issues    INFECTIOUS DISEASE  - No active issues  - plan for pulmonary edema as above  - Recent admission to OSH for ?PNA s/p 14 days of cefepime (last dose 10/14)  - Currently no signs of infection, WBC normal, afebrile, although monitor iso persistent tachycardia    ENDOCRINE    #DM2  - A1c 10/14 8.4%  - c/w Lantus 26u qhs + lispro 10u premeal + ISS  - trend FS, currently at goal    #Thyroid nodule  CT chest: 3. 3 x 1.8 cm left supraclavicular nodule likely arising from the thyroid gland   -thyroid US outpatient    HEMATOLOGIC/DVT PPX  - No active issues  - DVT ppx: heparin gtt    #ETHICS  Full code        Patient requires continuous monitoring with bedside rhythm monitoring, arterial line, pulse oximetry, ventilator monitoring and intermittent blood gas analysis.  Care plan discussed with ICU care team.  Patient remained critical; required more than usual post op care; I have spent 35 minutes providing non-routine post op care, revaluated multiple times during the day.

## 2023-10-31 NOTE — PROGRESS NOTE ADULT - SUBJECTIVE AND OBJECTIVE BOX
ADVANCED HEART FAILURE [facility] TRANSPLANT  - PROGRESS NOTE  *To reach the NS2 Team from 8am to 5pm, please call [phone]   ___________________________________________________________________________  Subjective:  - s/p MRI showing evolving PICA stroke  - ongoing confusion  - resting comfortably in bed without complaints. denies SOB, CP, lightheadedness, headache, pain    Medications:  aspirin enteric coated 81 milliGRAM(s) Oral daily  atorvastatin 80 milliGRAM(s) Oral at bedtime  benzocaine/menthol Lozenge 1 Lozenge Oral every 2 hours PRN  buMETAnide 2 milliGRAM(s) Oral daily  chlorhexidine 2% Cloths 1 Application(s) Topical daily  digoxin     Tablet 125 MICROGram(s) Oral daily  heparin  Infusion 1200 Unit(s)/Hr IV Continuous <Continuous>  insulin glargine Injectable (LANTUS) 26 Unit(s) SubCutaneous at bedtime  insulin lispro (ADMELOG) corrective regimen sliding scale   SubCutaneous three times a day before meals  insulin lispro (ADMELOG) corrective regimen sliding scale   SubCutaneous at bedtime  insulin lispro Injectable (ADMELOG) 10 Unit(s) SubCutaneous three times a day before meals  milrinone Infusion 0.125 MICROgram(s)/kG/Min IV Continuous <Continuous>  pantoprazole    Tablet 40 milliGRAM(s) Oral before breakfast  polyethylene glycol 3350 17 Gram(s) Oral daily PRN  senna 2 Tablet(s) Oral at bedtime  tamsulosin 0.4 milliGRAM(s) Oral at bedtime    Physical Exam:    Vitals:  Vital Signs Last 24 Hours  T(C): 36.8 (10-30-23 @ 20:00), Max: 36.8 (10-30-23 @ 20:00)  HR: 115 (10-31-23 @ 17:00) (114 - 126)  BP: 93/52 (10-31-23 @ 07:00) (92/60 - 99/56)  RR: 21 (10-31-23 @ 17:00) (15 - 41)  SpO2: 100% (10-31-23 @ 17:00) (89% - 100%)    I&O's Summary    30 Oct 2023 07:01  -  31 Oct 2023 07:00  --------------------------------------------------------  IN: 1604.6 mL / OUT: 1400 mL / NET: 204.6 mL    31 Oct 2023 07:01  -  31 Oct 2023 17:37  --------------------------------------------------------  IN: 834.1 mL / OUT: 200 mL / NET: 634.1 mL    Tele: ST    General: No distress. Comfortable.  HEENT: EOM intact.  Neck: Neck supple. JVP mildly elevated. No masses  Chest: Clear to auscultation bilaterally  CV: Tachycardic, regular. Normal S1 and S2. No murmurs, rub, or gallops. Radial pulses normal. No LE edema  Abdomen: Soft, non-distended, non-tender  Skin: No rashes or skin breakdown  Neurology: Alert, confused. Sensation intact  Psych: Affect normal    Labs:                        10.4   8.38  )-----------( 140      ( 31 Oct 2023 15:22 )             33.7     10-31    136  |  103  |  41<H>  ----------------------------<  176<H>  4.2   |  20<L>  |  2.00<H>    Ca    8.6      31 Oct 2023 15:22  Phos  4.7     10-31  Mg     2.0     10-31    TPro  5.9<L>  /  Alb  2.9<L>  /  TBili  0.2  /  DBili  x   /  AST  19  /  ALT  28  /  AlkPhos  65  10-31    PT/INR - ( 30 Oct 2023 05:08 )   PT: 12.9 sec;   INR: 1.18 ratio       PTT - ( 31 Oct 2023 12:40 )  PTT:68.8 sec    Oxygen Saturation, Mixed: 58.1 (10-31 @ 15:15)  Oxygen Saturation, Mixed: 59.1 (10-31 @ 00:04)      Lactate, Blood: 1.7 mmol/L (10-31 @ 15:22)  Lactate, Blood: 1.1 mmol/L (10-31 @ 00:22)  Lactate, Blood: 1.2 mmol/L (10-30 @ 05:08)  Lactate, Blood: 1.9 mmol/L (10-29 @ 14:23)  Lactate, Blood: 1.8 mmol/L (10-29 @ 01:02)  Lactate, Blood: 1.7 mmol/L (10-28 @ 20:27)

## 2023-10-31 NOTE — PROGRESS NOTE ADULT - PROBLEM SELECTOR PLAN 1
#HFrEF  - Etiology: ICM  - Last TTE: EF <20% rMWA  - Cath: : 95% discrete proximal LAD disease and sequential multifocal disease with good distal target, 80-90% proximal LCx disease just prior to marginals, severe multifocal RCA disease with good targets   - GDMT:    > BB: Holding while on inotropes given concern for borderline low output    > ACE/ARB/ARNI: Stopped Entresto 24-26 BID due to low flow and elevated Cr     > MRA: Stopped spironolactone 25 QDay due to low flow and elevated Cr     > SGLT2 I: Stopped Farxiga 10 QDay   - Inotropes: decrease milrinone to 0.125 mcg/kg/min. Trend perfusion labs, mVO2  - Diuretics: Bumex 2 PO QDay, goal even to slightly negative   > trend hemodynamics  - Advanced therapies: severe LV dysfunction with tachycardia and poor non-invasive hemodynamics concerning, currently holding off on launching VAD/transplant eval given cognitive issues however will continue to reassess. Of note he has good support and no clear psychosocial red flags. ABO AB. Appreciate neuro and behavioral psych input.   - F/U PT recs

## 2023-10-31 NOTE — PROGRESS NOTE ADULT - ASSESSMENT
====================ASSESSMENT ==============  59 yo Male pmhx CVA with mild left sided deficits, HTN, DM2, vertigo, HLD, Mobitz II s/pp Medtronic dual chamber ICD (12/21/22) initially presented to OSH for SOB c/f ADHF vs. PNA, later found to have NSTEMI transferred to [facility] for C evaluation. 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 Admitted to CICU for hypotension, inotropic support, hemodynamic monitoring. Previously downgraded on 10/22, RRT called while on the floor for AMS and hypotensive to 80/40, CTH 10/27 found possible R cerebellar infarct, patient re-transferred to CICU for inotropic support.    NEUROLOGICAL  #Possible R cerebellar infarct  #Encephalopathy   #Hx of CVA w/ mild L residual deficit  - currently A&Ox3  - CT head 10/27 w/ atrophy and small vessel white matter ischemic changes. R medial cerebellar infarct may be acute vs subacute  - pending MRI today 3 pm  - if infarct not acute, consider cardiogenic shock as source of altered mental status-> CT surg consult for CABG  - neurology following  - B12, TSH, RPR wnl  - neuro checks    CARDIOVASCULAR  #Hypotension/cardiogenic shock, iso acute on chronic HFrEF  TTE 10/16: EF 20%, severely reduced LVSF  [facility] 10/16: 95% pLAD, 80% mid and distal LCx, 90% mid and distal RCA  RHC 10/19: RA 10, PCWP 20, CO 4.4, CI 2.3  cMR 10/18: limited viability in LAD territory  TTE 10/26: EF <20 %, LV apical thrombus is seen. Small pericardial effusion noted adjacent to the right ventricle with no evidence of hemodynamic compromise.  - RRT called for 80/40 while on the floor, milrinone 0.125 restarted on 10/28  - trend lactate, proBNP  - c/w bumex 2 mg PO qd  - presently net positive, consider further bumex as BP allows  - pending HF recs regarding high risk PCI vs further intervention; EP recommended no indication for upgrade at this time. HF holding off on launching VAD/transplant eval  - hold Entresto i/s/o hypotension  - off hydral/nitrates  - s/p dig load, dig level 1.4, acceptable per HF   - f/u device interrogation  - avoid BB/CCB given borderline cardiac function  - start GDMT when appropriate  - Pending swan placement, previous unsuccessful attempt RIJ with visible thrombus on US & LIJ small    #LV apical thrombus  -c/w heparin gtt  -MRI brain and MRA H/N when stable  -neuro checks  -not a candidate for impella assisted PCI    #Tachycardia  Mobitz II s/p AICD  - Sinus tach vs. atach vs. aflutter. Iso increased cardiac demand/ hypoxia vs. iso infxn?  - per EP, appears sinus tach, no indication for cardioversion/EMILEE  - s/p dig load, dig level 1.4, acceptable per HF   - per EP, no need to upgrade device at this time    #HLD  - c/w Atorv 80mg     RESPIRATORY  #Pulmonary edema  Likely iso CHF vs. superimposed PNA  CT chest 10/28: scattered patchy and nodular bilateral upper lobe predominant groundglass opacities with interlobular septal thickening representing pulmonary edema  -c/w Bumex 2 qd   -infectious cause not excluded, low threshold to add abx if leukocytosis or febrile, as pt persistently tachy  -f/u noncontrast CT chest in 1-3 month to ensure resolution    GI/NUTRITION  No active issues  - CC DASH Halal diet  - GI ppx: PPI 40mg daily  - c/w Bowel regimen    /RENAL  #LAURA:   - sCr on admission 1.28, uptrending likely iso low flow state  -Trend sCr  -Monitor I/O  -c/w diuresis    #Elevated lactate - Improving  - uptrending lactate likely iso cardiogenic shock  - now at normal range  - c/w Milrinone, wean as tolerated  - continue to trend    #BPH  - c/w Flomax 0.4mg qhs    SKIN  - no active issues    INFECTIOUS DISEASE  - No active issues  - plan for pulmonary edema as above  - Recent admission to OSH for ?PNA s/p 14 days of cefepime (last dose 10/14)  - Currently no signs of infection, WBC normal, afebrile, although monitor iso persistent tachycardia    ENDOCRINE    #DM2  - A1c 10/14 8.4%  - c/w Lantus 24u qhs + lispro 8u premeal + ISS  - trend FS, will likely need adjustment    #Thyroid nodule  CT chest: 3. 3 x 1.8 cm left supraclavicular nodule likely arising from the thyroid gland   -thyroid US outpatient    HEMATOLOGIC/DVT PPX  - No active issues  - DVT ppx: heparin gtt    #ETHICS  Full code ====================ASSESSMENT ==============  59 yo Male pmhx CVA with mild left sided deficits, HTN, DM2, vertigo, HLD, Mobitz II s/pp Medtronic dual chamber ICD (12/21/22) initially presented to OSH for SOB c/f ADHF vs. PNA, later found to have NSTEMI transferred to [facility] for C evaluation. 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 Admitted to CICU for hypotension, inotropic support, hemodynamic monitoring. Previously downgraded on 10/22, RRT called while on the floor for AMS and hypotensive to 80/40, CTH 10/27 found possible R cerebellar infarct, patient re-transferred to CICU for inotropic support.    NEUROLOGICAL  #L Parietal and R PICA infarct/ embolic shower  #Encephalopathy   #Hx of CVA w/ mild L residual deficit  - currently A&Ox2-3  - MR head 10/30 w/ R PICA infarct and L parietal infarct, likely due to embolic shower, per neuro. Very mild hemorrhagic transformation in R PICA distribution   - MR neck showing occluded R-sided intracranial vertebral artery of unknown timeframe and mild focal stenosis of the R supraclinoid intracranial internal   carotid artery secondary to atherosclerosis  - per neuro, c/w heparin gtt as repeat CT head w/ no hemorrhage  - CT head 10/27 w/ atrophy and small vessel white matter ischemic changes  - neurology following  - B12, TSH, RPR wnl  - neuro checks    CARDIOVASCULAR  #Hypotension/cardiogenic shock, iso acute on chronic HFrEF  TTE 10/16: EF 20%, severely reduced LVSF  LHC 10/16: 95% pLAD, 80% mid and distal LCx, 90% mid and distal RCA  RHC 10/19: RA 10, PCWP 20, CO 4.4, CI 2.3  cMR 10/18: limited viability in LAD territory  TTE 10/26: EF <20 %, LV apical thrombus is seen. Small pericardial effusion noted adjacent to the right ventricle with no evidence of hemodynamic compromise.  -R groin swan placed 10/30: RA 14/13/12, RV 51/14, PA 47/34/39, PCW 34/36/32, CI 2.5/CO 4.95   - RRT called for 80/40 while on the floor, milrinone 0.125 restarted on 10/28  - trend lactate, proBNP  - c/w bumex 2 mg PO qd  -s/p bumex 2 ON  - presently net positive, consider further bumex as BP and Cr allows  - pending HF recs regarding high risk PCI vs further intervention; EP recommended no indication for upgrade at this time. HF holding off on launching VAD/transplant eval  - hold Entresto i/s/o hypotension  - off hydral/nitrates  - s/p dig load, dig level 1.4, acceptable per HF   - f/u device interrogation  - avoid BB/CCB given borderline cardiac function  - start GDMT when appropriate    #LV apical thrombus  -c/w heparin gtt  -MRI brain and MRA H/N showing 2 infarcts  -neuro checks  -not a candidate for impella assisted PCI    #Tachycardia  Mobitz II s/p AICD  - Sinus tach vs. atach vs. aflutter. Iso increased cardiac demand/ hypoxia vs. iso infxn?  - per EP, appears sinus tach, no indication for cardioversion/EMILEE  - s/p dig load, dig level 1.4, acceptable per HF   - per EP, no need to upgrade device at this time    #HLD  - c/w Atorv 80mg     RESPIRATORY  #Pulmonary edema  Likely iso CHF vs. superimposed PNA  CT chest 10/28: scattered patchy and nodular bilateral upper lobe predominant groundglass opacities with interlobular septal thickening representing pulmonary edema  -c/w Bumex 2 qd   -infectious cause not excluded, low threshold to add abx if leukocytosis or febrile, as pt persistently tachy  -f/u noncontrast CT chest in 1-3 month to ensure resolution    GI/NUTRITION  No active issues  - CC DASH Halal diet  - GI ppx: PPI 40mg daily  - c/w Bowel regimen    /RENAL  #LAURA:   - sCr on admission 1.28, uptrending today, likely prerenal iso diuresis   -consider urine lytes  -Trend sCr  -Monitor I/O  -c/w diuresis    #Elevated lactate - Improving  - uptrending lactate likely iso cardiogenic shock  - now at normal range  - c/w Milrinone, wean as tolerated  - continue to trend    #BPH  - c/w Flomax 0.4mg qhs    SKIN  - no active issues    INFECTIOUS DISEASE  - No active issues  - plan for pulmonary edema as above  - Recent admission to OSH for ?PNA s/p 14 days of cefepime (last dose 10/14)  - Currently no signs of infection, WBC normal, afebrile, although monitor iso persistent tachycardia    ENDOCRINE    #DM2  - A1c 10/14 8.4%  - c/w Lantus 26u qhs + lispro 10u premeal + ISS  - trend FS, currently at goal    #Thyroid nodule  CT chest: 3. 3 x 1.8 cm left supraclavicular nodule likely arising from the thyroid gland   -thyroid US outpatient    HEMATOLOGIC/DVT PPX  - No active issues  - DVT ppx: heparin gtt    #ETHICS  Full code

## 2023-10-31 NOTE — PROGRESS NOTE ADULT - ASSESSMENT
57 yo Male with prior Stroke with mild ?left sided deficits (improved), HTN, DM2, vertigo, HLD, Mobitz II s/pp Medtronic dual chamber ICD (12/21/22) initially presented to [facility] from home with complaints of dyspnea and chest pain and was admitted w/ acute hypoxic respiratory failure likely due to acute pulmonary edema due to acute HFrEF in setting of acute NSTEMI, LAURA and enterovirus infection. Pt with brief MICU stay at [facility] requiring bipap (no intubation), was treated for PNA with cefepime, and was found to have TTE done 9/26/23 showing EF 20% with severely decreased LVSF, multiple regional wall motion abnormalities, and elevated LV end diastolic pressure. Pt was transferred to [facility] for cardiac eval.   TTE EF < 20%  RHC 10/19: RA 10, RV 47/9/13, Wedge 29, PA 41/26/33, CO/CI 4.4/2.3, PA sat 57.3  EKG: sinus tachycardia, septal q-waves, left axis deviation   TTE 10/16/23: LV 5.5 cm, LVEF 20% with regional WMAs worse in the apex, LVOT VTI 8 cm, normal RV size/function, severe functional MR, small pericardial effusion, estimated RA pressure 8 mmHg   TTE 12/2022: normal LVEF   LHC: 95% discrete proximal LAD disease and sequential multifocal disease with good distal target, 80-90% proximal LCx disease just prior to marginals, severe multifocal RCA disease with good targets   A1c 8.4    CD 10/17 neg   NIHSS 1  CTH with age indeterminate R cerebellar infarct.  likely chronic   MRi brain with eovlving acute/subacute R PICA infarct with mild edema and mild hemorrhagic transformation. 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  occluded R VA.   CTH stable     Impression:   R cerebellar/PICA infarct as well as L parietal embolic infarct, likely cardioembolic      - CTS eval for CABG given 3 vessel disease vs high risk PCI   -  in CCU for milrinone pressure support   - c/w asa and statin therpay for secondary stroke prevention.   - no objection to heparin drip for low EF and LV thrombus   - called for risk stratification for heart transplant eval,  low-mod risk and no objection   - b12, TSH, RPR for reversible causes of dementia   - telemetry  - PT/OT   - check FS, glucose control <180  - GI/DVT ppx   - Thank you for allowing me to participate in the care of this patient. Call with questions.   spoke with primary team     Abelardo Irving MD  Vascular Neurology  Office: [phone]

## 2023-11-01 LAB
ALBUMIN SERPL ELPH-MCNC: 3 G/DL — LOW (ref 3.3–5)
ALBUMIN SERPL ELPH-MCNC: 3 G/DL — LOW (ref 3.3–5)
ALBUMIN SERPL ELPH-MCNC: 3.1 G/DL — LOW (ref 3.3–5)
ALBUMIN SERPL ELPH-MCNC: 3.1 G/DL — LOW (ref 3.3–5)
ALP SERPL-CCNC: 107 U/L — SIGNIFICANT CHANGE UP (ref 40–120)
ALP SERPL-CCNC: 107 U/L — SIGNIFICANT CHANGE UP (ref 40–120)
ALP SERPL-CCNC: 99 U/L — SIGNIFICANT CHANGE UP (ref 40–120)
ALP SERPL-CCNC: 99 U/L — SIGNIFICANT CHANGE UP (ref 40–120)
ALT FLD-CCNC: 37 U/L — SIGNIFICANT CHANGE UP (ref 10–45)
ALT FLD-CCNC: 37 U/L — SIGNIFICANT CHANGE UP (ref 10–45)
ALT FLD-CCNC: 40 U/L — SIGNIFICANT CHANGE UP (ref 10–45)
ALT FLD-CCNC: 40 U/L — SIGNIFICANT CHANGE UP (ref 10–45)
ANION GAP SERPL CALC-SCNC: 14 MMOL/L — SIGNIFICANT CHANGE UP (ref 5–17)
ANION GAP SERPL CALC-SCNC: 14 MMOL/L — SIGNIFICANT CHANGE UP (ref 5–17)
ANION GAP SERPL CALC-SCNC: 16 MMOL/L — SIGNIFICANT CHANGE UP (ref 5–17)
ANION GAP SERPL CALC-SCNC: 16 MMOL/L — SIGNIFICANT CHANGE UP (ref 5–17)
APTT BLD: 58.4 SEC — HIGH (ref 24.5–35.6)
APTT BLD: 58.4 SEC — HIGH (ref 24.5–35.6)
AST SERPL-CCNC: 19 U/L — SIGNIFICANT CHANGE UP (ref 10–40)
AST SERPL-CCNC: 19 U/L — SIGNIFICANT CHANGE UP (ref 10–40)
AST SERPL-CCNC: 30 U/L — SIGNIFICANT CHANGE UP (ref 10–40)
AST SERPL-CCNC: 30 U/L — SIGNIFICANT CHANGE UP (ref 10–40)
BASE EXCESS BLDMV CALC-SCNC: -2 MMOL/L — SIGNIFICANT CHANGE UP (ref -3–3)
BASE EXCESS BLDMV CALC-SCNC: -3.7 MMOL/L — LOW (ref -3–3)
BASE EXCESS BLDMV CALC-SCNC: -3.7 MMOL/L — LOW (ref -3–3)
BASE EXCESS BLDV CALC-SCNC: -2.5 MMOL/L — LOW (ref -2–3)
BASE EXCESS BLDV CALC-SCNC: -2.5 MMOL/L — LOW (ref -2–3)
BASE EXCESS BLDV CALC-SCNC: -2.7 MMOL/L — LOW (ref -2–3)
BASE EXCESS BLDV CALC-SCNC: -2.7 MMOL/L — LOW (ref -2–3)
BILIRUB SERPL-MCNC: 0.3 MG/DL — SIGNIFICANT CHANGE UP (ref 0.2–1.2)
BUN SERPL-MCNC: 42 MG/DL — HIGH (ref 7–23)
BUN SERPL-MCNC: 42 MG/DL — HIGH (ref 7–23)
BUN SERPL-MCNC: 45 MG/DL — HIGH (ref 7–23)
BUN SERPL-MCNC: 45 MG/DL — HIGH (ref 7–23)
CA-I SERPL-SCNC: 1.17 MMOL/L — SIGNIFICANT CHANGE UP (ref 1.15–1.33)
CA-I SERPL-SCNC: 1.17 MMOL/L — SIGNIFICANT CHANGE UP (ref 1.15–1.33)
CA-I SERPL-SCNC: 1.19 MMOL/L — SIGNIFICANT CHANGE UP (ref 1.15–1.33)
CA-I SERPL-SCNC: 1.19 MMOL/L — SIGNIFICANT CHANGE UP (ref 1.15–1.33)
CALCIUM SERPL-MCNC: 8.9 MG/DL — SIGNIFICANT CHANGE UP (ref 8.4–10.5)
CALCIUM SERPL-MCNC: 8.9 MG/DL — SIGNIFICANT CHANGE UP (ref 8.4–10.5)
CALCIUM SERPL-MCNC: 9.1 MG/DL — SIGNIFICANT CHANGE UP (ref 8.4–10.5)
CALCIUM SERPL-MCNC: 9.1 MG/DL — SIGNIFICANT CHANGE UP (ref 8.4–10.5)
CHLORIDE BLDV-SCNC: 101 MMOL/L — SIGNIFICANT CHANGE UP (ref 96–108)
CHLORIDE SERPL-SCNC: 98 MMOL/L — SIGNIFICANT CHANGE UP (ref 96–108)
CHLORIDE SERPL-SCNC: 98 MMOL/L — SIGNIFICANT CHANGE UP (ref 96–108)
CHLORIDE SERPL-SCNC: 99 MMOL/L — SIGNIFICANT CHANGE UP (ref 96–108)
CHLORIDE SERPL-SCNC: 99 MMOL/L — SIGNIFICANT CHANGE UP (ref 96–108)
CO2 BLDMV-SCNC: 23 MMOL/L — SIGNIFICANT CHANGE UP (ref 21–29)
CO2 BLDMV-SCNC: 23 MMOL/L — SIGNIFICANT CHANGE UP (ref 21–29)
CO2 BLDMV-SCNC: 24 MMOL/L — SIGNIFICANT CHANGE UP (ref 21–29)
CO2 BLDV-SCNC: 24 MMOL/L — SIGNIFICANT CHANGE UP (ref 22–26)
CO2 SERPL-SCNC: 18 MMOL/L — LOW (ref 22–31)
CO2 SERPL-SCNC: 18 MMOL/L — LOW (ref 22–31)
CO2 SERPL-SCNC: 20 MMOL/L — LOW (ref 22–31)
CO2 SERPL-SCNC: 20 MMOL/L — LOW (ref 22–31)
CREAT SERPL-MCNC: 2.15 MG/DL — HIGH (ref 0.5–1.3)
CREAT SERPL-MCNC: 2.15 MG/DL — HIGH (ref 0.5–1.3)
CREAT SERPL-MCNC: 2.17 MG/DL — HIGH (ref 0.5–1.3)
CREAT SERPL-MCNC: 2.17 MG/DL — HIGH (ref 0.5–1.3)
EGFR: 35 ML/MIN/1.73M2 — LOW
GAS PNL BLDA: SIGNIFICANT CHANGE UP
GAS PNL BLDA: SIGNIFICANT CHANGE UP
GAS PNL BLDMV: SIGNIFICANT CHANGE UP
GAS PNL BLDV: 127 MMOL/L — LOW (ref 136–145)
GAS PNL BLDV: 127 MMOL/L — LOW (ref 136–145)
GAS PNL BLDV: 130 MMOL/L — LOW (ref 136–145)
GAS PNL BLDV: 130 MMOL/L — LOW (ref 136–145)
GAS PNL BLDV: SIGNIFICANT CHANGE UP
GLUCOSE BLDC GLUCOMTR-MCNC: 200 MG/DL — HIGH (ref 70–99)
GLUCOSE BLDC GLUCOMTR-MCNC: 200 MG/DL — HIGH (ref 70–99)
GLUCOSE BLDC GLUCOMTR-MCNC: 236 MG/DL — HIGH (ref 70–99)
GLUCOSE BLDC GLUCOMTR-MCNC: 236 MG/DL — HIGH (ref 70–99)
GLUCOSE BLDC GLUCOMTR-MCNC: 272 MG/DL — HIGH (ref 70–99)
GLUCOSE BLDC GLUCOMTR-MCNC: 272 MG/DL — HIGH (ref 70–99)
GLUCOSE BLDC GLUCOMTR-MCNC: 311 MG/DL — HIGH (ref 70–99)
GLUCOSE BLDC GLUCOMTR-MCNC: 311 MG/DL — HIGH (ref 70–99)
GLUCOSE BLDV-MCNC: 219 MG/DL — HIGH (ref 70–99)
GLUCOSE BLDV-MCNC: 219 MG/DL — HIGH (ref 70–99)
GLUCOSE BLDV-MCNC: 336 MG/DL — HIGH (ref 70–99)
GLUCOSE BLDV-MCNC: 336 MG/DL — HIGH (ref 70–99)
GLUCOSE SERPL-MCNC: 215 MG/DL — HIGH (ref 70–99)
GLUCOSE SERPL-MCNC: 215 MG/DL — HIGH (ref 70–99)
GLUCOSE SERPL-MCNC: 232 MG/DL — HIGH (ref 70–99)
GLUCOSE SERPL-MCNC: 232 MG/DL — HIGH (ref 70–99)
HCO3 BLDMV-SCNC: 22 MMOL/L — SIGNIFICANT CHANGE UP (ref 20–28)
HCO3 BLDMV-SCNC: 22 MMOL/L — SIGNIFICANT CHANGE UP (ref 20–28)
HCO3 BLDMV-SCNC: 23 MMOL/L — SIGNIFICANT CHANGE UP (ref 20–28)
HCO3 BLDV-SCNC: 23 MMOL/L — SIGNIFICANT CHANGE UP (ref 22–29)
HCT VFR BLD CALC: 33.9 % — LOW (ref 39–50)
HCT VFR BLD CALC: 33.9 % — LOW (ref 39–50)
HCT VFR BLD CALC: 34.6 % — LOW (ref 39–50)
HCT VFR BLD CALC: 34.6 % — LOW (ref 39–50)
HCT VFR BLDA CALC: 31 % — LOW (ref 39–51)
HCT VFR BLDA CALC: 31 % — LOW (ref 39–51)
HCT VFR BLDA CALC: 33 % — LOW (ref 39–51)
HCT VFR BLDA CALC: 33 % — LOW (ref 39–51)
HGB BLD CALC-MCNC: 10.4 G/DL — LOW (ref 12.6–17.4)
HGB BLD CALC-MCNC: 10.4 G/DL — LOW (ref 12.6–17.4)
HGB BLD CALC-MCNC: 10.9 G/DL — LOW (ref 12.6–17.4)
HGB BLD CALC-MCNC: 10.9 G/DL — LOW (ref 12.6–17.4)
HGB BLD-MCNC: 10.5 G/DL — LOW (ref 13–17)
HGB BLD-MCNC: 10.5 G/DL — LOW (ref 13–17)
HGB BLD-MCNC: 10.6 G/DL — LOW (ref 13–17)
HGB BLD-MCNC: 10.6 G/DL — LOW (ref 13–17)
HOROWITZ INDEX BLDMV+IHG-RTO: 21 — SIGNIFICANT CHANGE UP
HOROWITZ INDEX BLDMV+IHG-RTO: 21 — SIGNIFICANT CHANGE UP
HOROWITZ INDEX BLDMV+IHG-RTO: 32 — SIGNIFICANT CHANGE UP
HOROWITZ INDEX BLDMV+IHG-RTO: 32 — SIGNIFICANT CHANGE UP
HOROWITZ INDEX BLDV+IHG-RTO: 21 — SIGNIFICANT CHANGE UP
HOROWITZ INDEX BLDV+IHG-RTO: 21 — SIGNIFICANT CHANGE UP
HOROWITZ INDEX BLDV+IHG-RTO: 32 — SIGNIFICANT CHANGE UP
HOROWITZ INDEX BLDV+IHG-RTO: 32 — SIGNIFICANT CHANGE UP
LACTATE BLDV-MCNC: 1.2 MMOL/L — SIGNIFICANT CHANGE UP (ref 0.5–2)
LACTATE BLDV-MCNC: 1.2 MMOL/L — SIGNIFICANT CHANGE UP (ref 0.5–2)
LACTATE BLDV-MCNC: 1.3 MMOL/L — SIGNIFICANT CHANGE UP (ref 0.5–2)
LACTATE BLDV-MCNC: 1.3 MMOL/L — SIGNIFICANT CHANGE UP (ref 0.5–2)
MAGNESIUM SERPL-MCNC: 1.9 MG/DL — SIGNIFICANT CHANGE UP (ref 1.6–2.6)
MAGNESIUM SERPL-MCNC: 1.9 MG/DL — SIGNIFICANT CHANGE UP (ref 1.6–2.6)
MAGNESIUM SERPL-MCNC: 2.4 MG/DL — SIGNIFICANT CHANGE UP (ref 1.6–2.6)
MAGNESIUM SERPL-MCNC: 2.4 MG/DL — SIGNIFICANT CHANGE UP (ref 1.6–2.6)
MCHC RBC-ENTMCNC: 25.2 PG — LOW (ref 27–34)
MCHC RBC-ENTMCNC: 25.2 PG — LOW (ref 27–34)
MCHC RBC-ENTMCNC: 25.4 PG — LOW (ref 27–34)
MCHC RBC-ENTMCNC: 25.4 PG — LOW (ref 27–34)
MCHC RBC-ENTMCNC: 30.6 GM/DL — LOW (ref 32–36)
MCHC RBC-ENTMCNC: 30.6 GM/DL — LOW (ref 32–36)
MCHC RBC-ENTMCNC: 31 GM/DL — LOW (ref 32–36)
MCHC RBC-ENTMCNC: 31 GM/DL — LOW (ref 32–36)
MCV RBC AUTO: 81.9 FL — SIGNIFICANT CHANGE UP (ref 80–100)
MCV RBC AUTO: 81.9 FL — SIGNIFICANT CHANGE UP (ref 80–100)
MCV RBC AUTO: 82.4 FL — SIGNIFICANT CHANGE UP (ref 80–100)
MCV RBC AUTO: 82.4 FL — SIGNIFICANT CHANGE UP (ref 80–100)
NRBC # BLD: 0 /100 WBCS — SIGNIFICANT CHANGE UP (ref 0–0)
O2 CT VFR BLD CALC: 38 MMHG — SIGNIFICANT CHANGE UP (ref 30–65)
O2 CT VFR BLD CALC: 38 MMHG — SIGNIFICANT CHANGE UP (ref 30–65)
O2 CT VFR BLD CALC: 40 MMHG — SIGNIFICANT CHANGE UP (ref 30–65)
PCO2 BLDMV: 40 MMHG — SIGNIFICANT CHANGE UP (ref 30–65)
PCO2 BLDMV: 42 MMHG — SIGNIFICANT CHANGE UP (ref 30–65)
PCO2 BLDMV: 42 MMHG — SIGNIFICANT CHANGE UP (ref 30–65)
PCO2 BLDV: 40 MMHG — LOW (ref 42–55)
PCO2 BLDV: 40 MMHG — LOW (ref 42–55)
PCO2 BLDV: 43 MMHG — SIGNIFICANT CHANGE UP (ref 42–55)
PCO2 BLDV: 43 MMHG — SIGNIFICANT CHANGE UP (ref 42–55)
PH BLDMV: 7.33 — SIGNIFICANT CHANGE UP (ref 7.32–7.45)
PH BLDMV: 7.33 — SIGNIFICANT CHANGE UP (ref 7.32–7.45)
PH BLDMV: 7.37 — SIGNIFICANT CHANGE UP (ref 7.32–7.45)
PH BLDV: 7.34 — SIGNIFICANT CHANGE UP (ref 7.32–7.43)
PH BLDV: 7.34 — SIGNIFICANT CHANGE UP (ref 7.32–7.43)
PH BLDV: 7.36 — SIGNIFICANT CHANGE UP (ref 7.32–7.43)
PH BLDV: 7.36 — SIGNIFICANT CHANGE UP (ref 7.32–7.43)
PHOSPHATE SERPL-MCNC: 5.1 MG/DL — HIGH (ref 2.5–4.5)
PHOSPHATE SERPL-MCNC: 5.1 MG/DL — HIGH (ref 2.5–4.5)
PHOSPHATE SERPL-MCNC: 5.2 MG/DL — HIGH (ref 2.5–4.5)
PHOSPHATE SERPL-MCNC: 5.2 MG/DL — HIGH (ref 2.5–4.5)
PLATELET # BLD AUTO: 184 K/UL — SIGNIFICANT CHANGE UP (ref 150–400)
PLATELET # BLD AUTO: 184 K/UL — SIGNIFICANT CHANGE UP (ref 150–400)
PLATELET # BLD AUTO: 202 K/UL — SIGNIFICANT CHANGE UP (ref 150–400)
PLATELET # BLD AUTO: 202 K/UL — SIGNIFICANT CHANGE UP (ref 150–400)
PO2 BLDV: 36 MMHG — SIGNIFICANT CHANGE UP (ref 25–45)
POTASSIUM BLDV-SCNC: 4.8 MMOL/L — SIGNIFICANT CHANGE UP (ref 3.5–5.1)
POTASSIUM BLDV-SCNC: 4.8 MMOL/L — SIGNIFICANT CHANGE UP (ref 3.5–5.1)
POTASSIUM BLDV-SCNC: 5.4 MMOL/L — HIGH (ref 3.5–5.1)
POTASSIUM BLDV-SCNC: 5.4 MMOL/L — HIGH (ref 3.5–5.1)
POTASSIUM SERPL-MCNC: 4.7 MMOL/L — SIGNIFICANT CHANGE UP (ref 3.5–5.3)
POTASSIUM SERPL-MCNC: 4.7 MMOL/L — SIGNIFICANT CHANGE UP (ref 3.5–5.3)
POTASSIUM SERPL-MCNC: 4.9 MMOL/L — SIGNIFICANT CHANGE UP (ref 3.5–5.3)
POTASSIUM SERPL-MCNC: 4.9 MMOL/L — SIGNIFICANT CHANGE UP (ref 3.5–5.3)
POTASSIUM SERPL-SCNC: 4.7 MMOL/L — SIGNIFICANT CHANGE UP (ref 3.5–5.3)
POTASSIUM SERPL-SCNC: 4.7 MMOL/L — SIGNIFICANT CHANGE UP (ref 3.5–5.3)
POTASSIUM SERPL-SCNC: 4.9 MMOL/L — SIGNIFICANT CHANGE UP (ref 3.5–5.3)
POTASSIUM SERPL-SCNC: 4.9 MMOL/L — SIGNIFICANT CHANGE UP (ref 3.5–5.3)
PROT SERPL-MCNC: 6.2 G/DL — SIGNIFICANT CHANGE UP (ref 6–8.3)
PROT SERPL-MCNC: 6.2 G/DL — SIGNIFICANT CHANGE UP (ref 6–8.3)
PROT SERPL-MCNC: 6.5 G/DL — SIGNIFICANT CHANGE UP (ref 6–8.3)
PROT SERPL-MCNC: 6.5 G/DL — SIGNIFICANT CHANGE UP (ref 6–8.3)
RBC # BLD: 4.14 M/UL — LOW (ref 4.2–5.8)
RBC # BLD: 4.14 M/UL — LOW (ref 4.2–5.8)
RBC # BLD: 4.2 M/UL — SIGNIFICANT CHANGE UP (ref 4.2–5.8)
RBC # BLD: 4.2 M/UL — SIGNIFICANT CHANGE UP (ref 4.2–5.8)
RBC # FLD: 15.5 % — HIGH (ref 10.3–14.5)
SAO2 % BLDMV: 54.9 — LOW (ref 60–90)
SAO2 % BLDMV: 54.9 — LOW (ref 60–90)
SAO2 % BLDMV: 58.2 — LOW (ref 60–90)
SAO2 % BLDMV: 58.2 — LOW (ref 60–90)
SAO2 % BLDMV: 62.6 — SIGNIFICANT CHANGE UP (ref 60–90)
SAO2 % BLDMV: 62.6 — SIGNIFICANT CHANGE UP (ref 60–90)
SAO2 % BLDV: 56.7 % — LOW (ref 67–88)
SAO2 % BLDV: 56.7 % — LOW (ref 67–88)
SAO2 % BLDV: 57.6 % — LOW (ref 67–88)
SAO2 % BLDV: 57.6 % — LOW (ref 67–88)
SODIUM SERPL-SCNC: 132 MMOL/L — LOW (ref 135–145)
SODIUM SERPL-SCNC: 132 MMOL/L — LOW (ref 135–145)
SODIUM SERPL-SCNC: 133 MMOL/L — LOW (ref 135–145)
SODIUM SERPL-SCNC: 133 MMOL/L — LOW (ref 135–145)
WBC # BLD: 10.16 K/UL — SIGNIFICANT CHANGE UP (ref 3.8–10.5)
WBC # BLD: 10.16 K/UL — SIGNIFICANT CHANGE UP (ref 3.8–10.5)
WBC # BLD: 9.45 K/UL — SIGNIFICANT CHANGE UP (ref 3.8–10.5)
WBC # BLD: 9.45 K/UL — SIGNIFICANT CHANGE UP (ref 3.8–10.5)
WBC # FLD AUTO: 10.16 K/UL — SIGNIFICANT CHANGE UP (ref 3.8–10.5)
WBC # FLD AUTO: 10.16 K/UL — SIGNIFICANT CHANGE UP (ref 3.8–10.5)
WBC # FLD AUTO: 9.45 K/UL — SIGNIFICANT CHANGE UP (ref 3.8–10.5)
WBC # FLD AUTO: 9.45 K/UL — SIGNIFICANT CHANGE UP (ref 3.8–10.5)

## 2023-11-01 PROCEDURE — 99291 CRITICAL CARE FIRST HOUR: CPT | Mod: GC

## 2023-11-01 PROCEDURE — 71045 X-RAY EXAM CHEST 1 VIEW: CPT | Mod: 26

## 2023-11-01 PROCEDURE — 99292 CRITICAL CARE ADDL 30 MIN: CPT

## 2023-11-01 PROCEDURE — 93010 ELECTROCARDIOGRAM REPORT: CPT

## 2023-11-01 PROCEDURE — 90834 PSYTX W PT 45 MINUTES: CPT

## 2023-11-01 PROCEDURE — 99291 CRITICAL CARE FIRST HOUR: CPT

## 2023-11-01 PROCEDURE — 90791 PSYCH DIAGNOSTIC EVALUATION: CPT

## 2023-11-01 RX ORDER — BUMETANIDE 0.25 MG/ML
2 INJECTION INTRAMUSCULAR; INTRAVENOUS ONCE
Refills: 0 | Status: COMPLETED | OUTPATIENT
Start: 2023-11-01 | End: 2023-11-01

## 2023-11-01 RX ORDER — ISOSORBIDE DINITRATE 5 MG/1
20 TABLET ORAL THREE TIMES A DAY
Refills: 0 | Status: DISCONTINUED | OUTPATIENT
Start: 2023-11-01 | End: 2023-11-08

## 2023-11-01 RX ORDER — ISOSORBIDE DINITRATE 5 MG/1
10 TABLET ORAL ONCE
Refills: 0 | Status: COMPLETED | OUTPATIENT
Start: 2023-11-01 | End: 2023-11-01

## 2023-11-01 RX ORDER — ISOSORBIDE DINITRATE 5 MG/1
10 TABLET ORAL THREE TIMES A DAY
Refills: 0 | Status: DISCONTINUED | OUTPATIENT
Start: 2023-11-01 | End: 2023-11-01

## 2023-11-01 RX ORDER — HYDRALAZINE HCL 50 MG
25 TABLET ORAL THREE TIMES A DAY
Refills: 0 | Status: DISCONTINUED | OUTPATIENT
Start: 2023-11-01 | End: 2023-11-01

## 2023-11-01 RX ORDER — HYDRALAZINE HCL 50 MG
10 TABLET ORAL ONCE
Refills: 0 | Status: COMPLETED | OUTPATIENT
Start: 2023-11-01 | End: 2023-11-01

## 2023-11-01 RX ORDER — HYDRALAZINE HCL 50 MG
35 TABLET ORAL THREE TIMES A DAY
Refills: 0 | Status: DISCONTINUED | OUTPATIENT
Start: 2023-11-01 | End: 2023-11-02

## 2023-11-01 RX ORDER — MAGNESIUM SULFATE 500 MG/ML
2 VIAL (ML) INJECTION ONCE
Refills: 0 | Status: COMPLETED | OUTPATIENT
Start: 2023-11-01 | End: 2023-11-01

## 2023-11-01 RX ORDER — ISOSORBIDE DINITRATE 5 MG/1
10 TABLET ORAL ONCE
Refills: 0 | Status: DISCONTINUED | OUTPATIENT
Start: 2023-11-01 | End: 2023-11-01

## 2023-11-01 RX ADMIN — BUMETANIDE 2 MILLIGRAM(S): 0.25 INJECTION INTRAMUSCULAR; INTRAVENOUS at 02:09

## 2023-11-01 RX ADMIN — Medication 4: at 18:44

## 2023-11-01 RX ADMIN — ATORVASTATIN CALCIUM 80 MILLIGRAM(S): 80 TABLET, FILM COATED ORAL at 21:10

## 2023-11-01 RX ADMIN — Medication 1: at 21:09

## 2023-11-01 RX ADMIN — Medication 2: at 09:05

## 2023-11-01 RX ADMIN — BUMETANIDE 2 MILLIGRAM(S): 0.25 INJECTION INTRAMUSCULAR; INTRAVENOUS at 16:33

## 2023-11-01 RX ADMIN — HEPARIN SODIUM 12 UNIT(S)/HR: 5000 INJECTION INTRAVENOUS; SUBCUTANEOUS at 21:10

## 2023-11-01 RX ADMIN — Medication 81 MILLIGRAM(S): at 12:52

## 2023-11-01 RX ADMIN — Medication 10 UNIT(S): at 18:45

## 2023-11-01 RX ADMIN — Medication 25 MILLIGRAM(S): at 06:04

## 2023-11-01 RX ADMIN — BUMETANIDE 2 MILLIGRAM(S): 0.25 INJECTION INTRAMUSCULAR; INTRAVENOUS at 22:24

## 2023-11-01 RX ADMIN — TAMSULOSIN HYDROCHLORIDE 0.4 MILLIGRAM(S): 0.4 CAPSULE ORAL at 21:10

## 2023-11-01 RX ADMIN — Medication 10 UNIT(S): at 12:52

## 2023-11-01 RX ADMIN — BUMETANIDE 2 MILLIGRAM(S): 0.25 INJECTION INTRAMUSCULAR; INTRAVENOUS at 09:06

## 2023-11-01 RX ADMIN — Medication 10 UNIT(S): at 09:05

## 2023-11-01 RX ADMIN — ISOSORBIDE DINITRATE 20 MILLIGRAM(S): 5 TABLET ORAL at 21:10

## 2023-11-01 RX ADMIN — MILRINONE LACTATE 2.63 MICROGRAM(S)/KG/MIN: 1 INJECTION, SOLUTION INTRAVENOUS at 09:06

## 2023-11-01 RX ADMIN — Medication 1: at 12:52

## 2023-11-01 RX ADMIN — Medication 25 GRAM(S): at 06:05

## 2023-11-01 RX ADMIN — Medication 10 MILLIGRAM(S): at 02:09

## 2023-11-01 RX ADMIN — CHLORHEXIDINE GLUCONATE 1 APPLICATION(S): 213 SOLUTION TOPICAL at 21:10

## 2023-11-01 RX ADMIN — PANTOPRAZOLE SODIUM 40 MILLIGRAM(S): 20 TABLET, DELAYED RELEASE ORAL at 06:05

## 2023-11-01 RX ADMIN — INSULIN GLARGINE 26 UNIT(S): 100 INJECTION, SOLUTION SUBCUTANEOUS at 21:09

## 2023-11-01 RX ADMIN — ISOSORBIDE DINITRATE 10 MILLIGRAM(S): 5 TABLET ORAL at 16:33

## 2023-11-01 RX ADMIN — Medication 25 MILLIGRAM(S): at 21:10

## 2023-11-01 RX ADMIN — Medication 25 MILLIGRAM(S): at 16:33

## 2023-11-01 RX ADMIN — Medication 125 MICROGRAM(S): at 06:04

## 2023-11-01 NOTE — PROGRESS NOTE ADULT - PROBLEM SELECTOR PLAN 1
#HFrEF  - Etiology: ICM  - Last TTE: EF <20% rMWA  - Cath: : 95% discrete proximal LAD disease and sequential multifocal disease with good distal target, 80-90% proximal LCx disease just prior to marginals, severe multifocal RCA disease with good targets   - GDMT:    > BB: Holding while on inotropes given borderline low output    > ACE/ARB/ARNI: Stopped Entresto 24-26 BID due to low flow and elevated Cr. Please start Isordil 10mg TID and continue hydralazine 25mg TID with uptitration Qdose as tolerated    > MRA: Stopped spironolactone 25 QDay due to low flow and elevated Cr     > SGLT2 I: Stopped Farxiga 10 QDay   - Inotropes: stop milrinone. Trend perfusion labs, mVO2  - Diuretics: Bumex 2IV x 1 and PRN for goal net negative at least 2L, CVP goal ~8   - Advanced therapies: severe LV dysfunction with tachycardia and poor non-invasive hemodynamics concerning, currently holding off on launching VAD/transplant eval given cognitive issues however will continue to reassess. Of note he has good support and no clear psychosocial red flags. ABO AB. Appreciate neuro and behavioral psych input.   - F/U PT recs  - please check post void residual bladder scan

## 2023-11-01 NOTE — PROGRESS NOTE ADULT - SUBJECTIVE AND OBJECTIVE BOX
Mr Celeste has severe ischemic cardiomyopathy with documented lack of viability in the LAD territory.    Therefore, I do not think CABG will benefit him, and if anything represents a risky endeavor.    I would suggest consideration of advanced therapies per the heart failure service as indicated.

## 2023-11-01 NOTE — CHART NOTE - NSCHARTNOTEFT_GEN_A_CORE
Electrophysiology:    Review of tele overnight has been in sinus rhythm with rates of 80s, no further ectopy noted  Continue current medications  Will sign off, please recall as needed    #[phone]

## 2023-11-01 NOTE — PROGRESS NOTE ADULT - SUBJECTIVE AND OBJECTIVE BOX
Patient is a 58y old  Male who presents with a chief complaint of NSTEMI (28 Oct 2023 19:24)    HPI:  Patient with separate chart from [facility], MRN# [id number]    57 yo Male pmhx CVA with mild left sided deficits, HTN, DM2, vertigo, HLD, Mobitz II s/pp Medtronic dual chamber ICD (22) initially presented to [facility] from home with complaints of dyspnea and chest pain and was admitted w/ acute hypoxic respiratory failure likely due to acute pulmonary edema due to acute HFrEF in setting of acute NSTEMI, LAURA and enterovirus infection. Pt with brief MICU stay at [facility] requiring bipap (no intubation), was treated for PNA with cefepime, and was found to have TTE done 23 showing EF 20% with severely decreased LVSF, multiple regional wall motion abnormalities, and elevated LV end diastolic pressure. Pt was transferred to [facility] for cardiac cath evaluation. Patient without any acute complaints, complaining of intermittent palpitations for the last 2 days. No chest pain, SOB, fevers, nausea, vomiting, abdominal pain.  (13 Oct 2023 21:05)       INTERVAL HPI/OVERNIGHT EVENTS:   Pt had R groin swan placed yesterday. ON, he was diuresed with bumex 2 with good UO. Pt had MRI which showed very mild hemorrhagic component to stroke, however repeat CT showed no hemorrhage so per neuro, heparin gtt was ok to continue.    Patient seen and examined at bedside. Pt stated he is feeling well. A&Ox2-3 with a lot of prompting (knows name, knows in hospital, thought it was November, did not know year).  No acute complaints, denies headache/ cp/sob/dizziness/palpitations.    ICU Vital Signs Last 24 Hrs  T(C): 36.8 (30 Oct 2023 20:00), Max: 36.9 (30 Oct 2023 08:00)  T(F): 98.3 (30 Oct 2023 20:00), Max: 98.5 (30 Oct 2023 08:00)  HR: 120 (31 Oct 2023 06:00) (119 - 127)  BP: 99/56 (30 Oct 2023 21:00) (92/60 - 109/68)  BP(mean): 70 (30 Oct 2023 21:00) (70 - 85)  ABP: 102/60 (31 Oct 2023 06:00) (38/36 - 122/72)  ABP(mean): 74 (31 Oct 2023 06:00) (38 - 85)  RR: 28 (31 Oct 2023 06:00) (14 - 41)  SpO2: 94% (31 Oct 2023 06:00) (89% - 99%)    O2 Parameters below as of 31 Oct 2023 04:00  Patient On (Oxygen Delivery Method): nasal cannula  O2 Flow (L/min): 2    I&O's Summary    30 Oct 2023 07:01  -  31 Oct 2023 07:00  --------------------------------------------------------  IN: 1587.3 mL / OUT: 1400 mL / NET: 187.3 mL      Daily     Daily Weight in k.5 (28 Oct 2023 12:15)      EKG/Telemetry Events:    MEDICATIONS  (STANDING):  aspirin enteric coated 81 milliGRAM(s) Oral daily  atorvastatin 80 milliGRAM(s) Oral at bedtime  buMETAnide 2 milliGRAM(s) Oral daily  chlorhexidine 2% Cloths 1 Application(s) Topical daily  dextrose 5%. 1000 milliLiter(s) (50 mL/Hr) IV Continuous <Continuous>  dextrose 5%. 1000 milliLiter(s) (100 mL/Hr) IV Continuous <Continuous>  digoxin     Tablet 125 MICROGram(s) Oral daily  heparin  Infusion 1200 Unit(s)/Hr (12 mL/Hr) IV Continuous <Continuous>  insulin glargine Injectable (LANTUS) 24 Unit(s) SubCutaneous at bedtime  insulin lispro (ADMELOG) corrective regimen sliding scale   SubCutaneous three times a day before meals  insulin lispro (ADMELOG) corrective regimen sliding scale   SubCutaneous at bedtime  insulin lispro Injectable (ADMELOG) 8 Unit(s) SubCutaneous three times a day before meals  milrinone Infusion 0.125 MICROgram(s)/kG/Min (2.63 mL/Hr) IV Continuous <Continuous>  pantoprazole    Tablet 40 milliGRAM(s) Oral before breakfast  senna 2 Tablet(s) Oral at bedtime  tamsulosin 0.4 milliGRAM(s) Oral at bedtime    MEDICATIONS  (PRN):  benzocaine/menthol Lozenge 1 Lozenge Oral every 2 hours PRN Sore Throat  polyethylene glycol 3350 17 Gram(s) Oral daily PRN Constipation      PHYSICAL EXAM:  GENERAL: AAOx4 NAD  HEAD:  Atraumatic, Normocephalic  EYES: EOMI, PERRLA, conjunctiva and sclera clear  NECK: Supple, No JVD, Normal thyroid, no enlarged nodes  NERVOUS SYSTEM: Strength 5/5 throughout intact finger to nose  CHEST/LUNG: B/L good air entry; No rales, rhonchi, or wheezing  HEART: S1S2 normal, no S3, Regular rate and rhythm; No murmurs  ABDOMEN: Soft, Nontender, Nondistended; Bowel sounds present  EXTREMITIES:  2+ Peripheral Pulses, No clubbing, cyanosis, or edema  LYMPH: No lymphadenopathy noted  SKIN: No rashes or lesions    LABS:             CBC Full  -  ( 31 Oct 2023 00:22 )  WBC Count : 7.46 K/uL  RBC Count : 4.00 M/uL  Hemoglobin : 10.2 g/dL  Hematocrit : 32.4 %  Platelet Count - Automated : 233 K/uL  Mean Cell Volume : 81.0 fl  Mean Cell Hemoglobin : 25.5 pg  Mean Cell Hemoglobin Concentration : 31.5 gm/dL  Auto Neutrophil # : x  Auto Lymphocyte # : x  Auto Monocyte # : x  Auto Eosinophil # : x  Auto Basophil # : x  Auto Neutrophil % : x  Auto Lymphocyte % : x  Auto Monocyte % : x  Auto Eosinophil % : x  Auto Basophil % : x    10-31    137  |  104  |  47<H>  ----------------------------<  125<H>  4.1   |  19<L>  |  2.11<H>    Ca    8.8      31 Oct 2023 00:13  Phos  4.6     10-  Mg     2.2     10-31    TPro  6.3  /  Alb  3.0<L>  /  TBili  0.2  /  DBili  x   /  AST  23  /  ALT  27  /  AlkPhos  67  10-         PTT - ( 29 Oct 2023 00:03 )  PTT:56.0 sec  CAPILLARY BLOOD GLUCOSE      POCT Blood Glucose.: 200 mg/dL (28 Oct 2023 21:15)  POCT Blood Glucose.: 324 mg/dL (28 Oct 2023 17:00)  POCT Blood Glucose.: 162 mg/dL (28 Oct 2023 11:53)            Urinalysis Basic - ( 29 Oct 2023 05:42 )    Color: x / Appearance: x / SG: x / pH: x  Gluc: 216 mg/dL / Ketone: x  / Bili: x / Urobili: x   Blood: x / Protein: x / Nitrite: x   Leuk Esterase: x / RBC: x / WBC x   Sq Epi: x / Non Sq Epi: x / Bacteria: x          RADIOLOGY & ADDITIONAL TESTS:    < from: MR Angio Neck No Cont (10.30.23 @ 20:59) >  IMPRESSION:    MRI BRAIN:  1. Evolving acute/subacute right-sided PICA distribution infarction with   associated cytotoxic edema and very mild hemorrhagic transformation.  2. Additional wedge-shaped area of acute/subacute ischemia within the   left parietal periatrial white matter with associated cytotoxic edema.  3. Indeterminate soft tissue lesion involving the left superior pinna   measuring 1.4 x 0.9 cm. Neoplasm cannot be excluded. Recommend   correlation with direct physical examination and visualization.    MRA NECK:  1. Extremely motion limited study.  2. 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 Recommend further evaluation with a CT angiogram study   of the neck.    MRA HEAD:  1. 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 This can be further evaluated with a CT angiogram study of the   head.  2. Mild focal stenosis of the right supraclinoid intracranial internal   carotid artery secondary to atherosclerosis.  3. Otherwise, no large vessel occlusion or major stenosis.    --- End of Report ---    < end of copied text >      Care Discussed with Consultants/Other Providers [ x] YES  [ ] NO           Patient is a 58y old  Male who presents with a chief complaint of NSTEMI (28 Oct 2023 19:24)    HPI:  Patient with separate chart from [facility], MRN# [id number]    57 yo Male pmhx CVA with mild left sided deficits, HTN, DM2, vertigo, HLD, Mobitz II s/pp Medtronic dual chamber ICD (22) initially presented to [facility] from home with complaints of dyspnea and chest pain and was admitted w/ acute hypoxic respiratory failure likely due to acute pulmonary edema due to acute HFrEF in setting of acute NSTEMI, LAURA and enterovirus infection. Pt with brief MICU stay at [facility] requiring bipap (no intubation), was treated for PNA with cefepime, and was found to have TTE done 23 showing EF 20% with severely decreased LVSF, multiple regional wall motion abnormalities, and elevated LV end diastolic pressure. Pt was transferred to [facility] for cardiac cath evaluation. Patient without any acute complaints, complaining of intermittent palpitations for the last 2 days. No chest pain, SOB, fevers, nausea, vomiting, abdominal pain.  (13 Oct 2023 21:05)       INTERVAL HPI/OVERNIGHT EVENTS:   ON, pt received hydral 10 x2, additional 2 IV bumex with net positive UO. Hydral 25 tid was also added.    Patient seen and examined at bedside. Pt stated he is feeling well. A&Ox2-3 with a lot of prompting (knows name, knows in hospital, thought it was November, did not know year, knew who the president is).  No acute complaints, denied headache/ cp/sob/dizziness/palpitations/paresthesia/weakness.    ICU Vital Signs Last 24 Hrs  T(C): 37 (2023 07:00), Max: 37.1 (31 Oct 2023 20:00)  T(F): 98.6 (2023 07:00), Max: 98.7 (31 Oct 2023 20:00)  HR: 105 (2023 08:00) (105 - 121)  BP: --  BP(mean): --  ABP: 126/66 (2023 08:00) (94/47 - 136/68)  ABP(mean): 84 (2023 08:00) (63 - 87)  RR: 18 (2023 08:00) (12 - 23)  SpO2: 91% (2023 08:00) (91% - 100%)    O2 Parameters below as of 2023 08:00  Patient On (Oxygen Delivery Method): room air    I&O's Summary    30 Oct 2023 07:01  -  31 Oct 2023 07:00  --------------------------------------------------------  IN: 1587.3 mL / OUT: 1400 mL / NET: 187.3 mL      Daily     Daily Weight in k.5 (28 Oct 2023 12:15)      EKG/Telemetry Events:    MEDICATIONS  (STANDING):  aspirin enteric coated 81 milliGRAM(s) Oral daily  atorvastatin 80 milliGRAM(s) Oral at bedtime  buMETAnide 2 milliGRAM(s) Oral daily  chlorhexidine 2% Cloths 1 Application(s) Topical daily  dextrose 5%. 1000 milliLiter(s) (50 mL/Hr) IV Continuous <Continuous>  dextrose 5%. 1000 milliLiter(s) (100 mL/Hr) IV Continuous <Continuous>  digoxin     Tablet 125 MICROGram(s) Oral daily  heparin  Infusion 1200 Unit(s)/Hr (12 mL/Hr) IV Continuous <Continuous>  insulin glargine Injectable (LANTUS) 24 Unit(s) SubCutaneous at bedtime  insulin lispro (ADMELOG) corrective regimen sliding scale   SubCutaneous three times a day before meals  insulin lispro (ADMELOG) corrective regimen sliding scale   SubCutaneous at bedtime  insulin lispro Injectable (ADMELOG) 8 Unit(s) SubCutaneous three times a day before meals  milrinone Infusion 0.125 MICROgram(s)/kG/Min (2.63 mL/Hr) IV Continuous <Continuous>  pantoprazole    Tablet 40 milliGRAM(s) Oral before breakfast  senna 2 Tablet(s) Oral at bedtime  tamsulosin 0.4 milliGRAM(s) Oral at bedtime    MEDICATIONS  (PRN):  benzocaine/menthol Lozenge 1 Lozenge Oral every 2 hours PRN Sore Throat  polyethylene glycol 3350 17 Gram(s) Oral daily PRN Constipation      PHYSICAL EXAM:  GENERAL: AAOx2-3 NAD  HEAD:  Atraumatic, Normocephalic  EYES: PERRLA, conjunctiva and sclera clear  NECK: Supple, No JVD, Normal thyroid, no enlarged nodes  NERVOUS SYSTEM: Strength 5/5 throughout (unable to assess RLE strength due to swan), sensation present throughout, CN intact, pt w/ difficulty expressing words almost like Broca's aphasia  CHEST/LUNG: coarse breath sounds on expiration LLL; No rales, rhonchi, or wheezing  HEART: S1S2 normal, Regular rate and rhythm; No murmurs appreciated  ABDOMEN: Soft, Nontender, Nondistended; Bowel sounds present  EXTREMITIES:  2+ Peripheral Pulses, No clubbing, cyanosis, or edema  LYMPH: No lymphadenopathy noted  SKIN: No rashes or lesions    LABS:             CBC Full  -  ( 2023 01:38 )  WBC Count : 9.45 K/uL  RBC Count : 4.14 M/uL  Hemoglobin : 10.5 g/dL  Hematocrit : 33.9 %  Platelet Count - Automated : 202 K/uL  Mean Cell Volume : 81.9 fl  Mean Cell Hemoglobin : 25.4 pg  Mean Cell Hemoglobin Concentration : 31.0 gm/dL  Auto Neutrophil # : x  Auto Lymphocyte # : x  Auto Monocyte # : x  Auto Eosinophil # : x  Auto Basophil # : x  Auto Neutrophil % : x  Auto Lymphocyte % : x  Auto Monocyte % : x  Auto Eosinophil % : x  Auto Basophil % : x        132<L>  |  98  |  42<H>  ----------------------------<  232<H>  4.7   |  18<L>  |  2.15<H>    Ca    9.1      2023 01:38  Phos  5.2       Mg     1.9         TPro  6.5  /  Alb  3.0<L>  /  TBili  0.3  /  DBili  x   /  AST  30  /  ALT  40  /  AlkPhos  107         PTT - ( 29 Oct 2023 00:03 )  PTT:56.0 sec  CAPILLARY BLOOD GLUCOSE      POCT Blood Glucose.: 200 mg/dL (28 Oct 2023 21:15)  POCT Blood Glucose.: 324 mg/dL (28 Oct 2023 17:00)  POCT Blood Glucose.: 162 mg/dL (28 Oct 2023 11:53)            Urinalysis Basic - ( 29 Oct 2023 05:42 )    Color: x / Appearance: x / SG: x / pH: x  Gluc: 216 mg/dL / Ketone: x  / Bili: x / Urobili: x   Blood: x / Protein: x / Nitrite: x   Leuk Esterase: x / RBC: x / WBC x   Sq Epi: x / Non Sq Epi: x / Bacteria: x          RADIOLOGY & ADDITIONAL TESTS:    < from: MR Angio Neck No Cont (10.30.23 @ 20:59) >  IMPRESSION:    MRI BRAIN:  1. Evolving acute/subacute right-sided PICA distribution infarction with   associated cytotoxic edema and very mild hemorrhagic transformation.  2. Additional wedge-shaped area of acute/subacute ischemia within the   left parietal periatrial white matter with associated cytotoxic edema.  3. Indeterminate soft tissue lesion involving the left superior pinna   measuring 1.4 x 0.9 cm. Neoplasm cannot be excluded. Recommend   correlation with direct physical examination and visualization.    MRA NECK:  1. Extremely motion limited study.  2. 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 Recommend further evaluation with a CT angiogram study   of the neck.    MRA HEAD:  1. 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 This can be further evaluated with a CT angiogram study of the   head.  2. Mild focal stenosis of the right supraclinoid intracranial internal   carotid artery secondary to atherosclerosis.  3. Otherwise, no large vessel occlusion or major stenosis.    --- End of Report ---    < end of copied text >      Care Discussed with Consultants/Other Providers [ x] YES  [ ] NO

## 2023-11-01 NOTE — PROGRESS NOTE ADULT - NS ATTEND AMEND GEN_ALL_CORE FT
Patient remains stable on milrinone gtt at 0.125 mcg/kg/min, CI 2.3, pressures are higher, HR has decreased to 110s. CXR remains congested. Given lack of viability in LAD territory, CTS deemed him a poor candidate for CABG. CMR shows evolving PICA stroke as well as L parietal embolic infarct. Will continue to wean milrinone and optimize GDMT. Further evaluation required for better assessment of cognition.     Discontinue milrinone   Continue to monitor MV02 q4 hours to assess for drop in CO/CI   Start ISDN 10 mg TID / Continue hydralazine 25 mg TID - up-titrate both as needed to maintain MAP ~70 mmHg   Give Bumex 2 mg iv push and futher doses as needed to maintain CVP ~8  Strict I/Os   Daily weight   Daily CXR  Get iron profile   Psychology and psychiatry follow up appreciated

## 2023-11-01 NOTE — PROGRESS NOTE ADULT - SUBJECTIVE AND OBJECTIVE BOX
Patient is a 58y old  Male who presents with a chief complaint of NSTEMI (2023 17:43)      INTERVAL HISTORY:  -    SUBJECTIVE  - Patient seen and evaluated at bedside.     MEDICATIONS:  MEDICATIONS  (STANDING):  aspirin enteric coated 81 milliGRAM(s) Oral daily  atorvastatin 80 milliGRAM(s) Oral at bedtime  buMETAnide 2 milliGRAM(s) Oral daily  chlorhexidine 2% Cloths 1 Application(s) Topical daily  digoxin     Tablet 125 MICROGram(s) Oral daily  heparin  Infusion 1200 Unit(s)/Hr (12 mL/Hr) IV Continuous <Continuous>  hydrALAZINE 25 milliGRAM(s) Oral three times a day  insulin glargine Injectable (LANTUS) 26 Unit(s) SubCutaneous at bedtime  insulin lispro (ADMELOG) corrective regimen sliding scale   SubCutaneous three times a day before meals  insulin lispro (ADMELOG) corrective regimen sliding scale   SubCutaneous at bedtime  insulin lispro Injectable (ADMELOG) 10 Unit(s) SubCutaneous three times a day before meals  isosorbide   dinitrate Tablet (ISORDIL) 20 milliGRAM(s) Oral three times a day  pantoprazole    Tablet 40 milliGRAM(s) Oral before breakfast  senna 2 Tablet(s) Oral at bedtime  tamsulosin 0.4 milliGRAM(s) Oral at bedtime    MEDICATIONS  (PRN):  benzocaine/menthol Lozenge 1 Lozenge Oral every 2 hours PRN Sore Throat  polyethylene glycol 3350 17 Gram(s) Oral daily PRN Constipation      OBJECTIVE:  ICU Vital Signs Last 24 Hrs  T(C): 37.4 (2023 19:00), Max: 37.4 (2023 19:00)  T(F): 99.3 (2023 19:00), Max: 99.3 (2023 19:00)  HR: 103 (2023 19:00) (103 - 115)  BP: --  BP(mean): --  ABP: 131/70 (2023 19:00) (100/55 - 146/77)  ABP(mean): 88 (2023 19:00) (71 - 99)  RR: 20 (2023 19:00) (12 - 49)  SpO2: 95% (2023 19:00) (91% - 100%)    O2 Parameters below as of 2023 19:00  Patient On (Oxygen Delivery Method): nasal cannula  O2 Flow (L/min): 3          Adult Advanced Hemodynamics Last 24 Hrs  CVP(mm Hg): 17 (2023 19:00) (6 - 18)  CVP(cm H2O): --  CO: --  CI: --  PA: 59/32 (2023 19:00) (45/19 - 81/49)  PA(mean): 43 (2023 19:00) (32 - 65)  PCWP: --  SVR: --  SVRI: --  PVR: --  PVRI: --  CAPILLARY BLOOD GLUCOSE      POCT Blood Glucose.: 311 mg/dL (2023 18:38)  POCT Blood Glucose.: 200 mg/dL (2023 12:46)  POCT Blood Glucose.: 236 mg/dL (2023 08:47)  POCT Blood Glucose.: 213 mg/dL (31 Oct 2023 21:37)    CAPILLARY BLOOD GLUCOSE      POCT Blood Glucose.: 311 mg/dL (2023 18:38)    I&O's Summary    31 Oct 2023 07:01  -  2023 07:00  --------------------------------------------------------  IN: 1223.9 mL / OUT: 725 mL / NET: 498.9 mL    2023 07:01  -  2023 19:44  --------------------------------------------------------  IN: 499.8 mL / OUT: 200 mL / NET: 299.8 mL      Daily     Daily Weight in k.2 (2023 06:00)    PHYSICAL EXAM:  General: NAD, well-groomed, well-developed  Eyes: Conjunctiva and sclera clear  ENMT: Moist mucous membranes  Neck: Supple  Chest: Clear to auscultation bilaterally; no rales, rhonchi, or wheezing  Heart: Regular rate and rhythm; normal S1 and S2; no murmurs, rubs, or gallops  Abd: Soft, nontender, nondistended  Nervous System: AAOX3  Ext: no peripheral LE edema bilaterally  Lines/Tubes: IV peripheral    LABS:  ABG - ( 2023 01:30 )  pH, Arterial: 7.40  pH, Blood: x     /  pCO2: 33    /  pO2: 95    / HCO3: 20    / Base Excess: -3.7  /  SaO2: 98.7                                    10.6   10.16 )-----------( 184      ( 2023 12:56 )             34.6     11-    133<L>  |  99  |  45<H>  ----------------------------<  215<H>  4.9   |  20<L>  |  2.17<H>    Ca    8.9      2023 12:56  Phos  5.1     11  Mg     2.4         TPro  6.2  /  Alb  3.1<L>  /  TBili  0.3  /  DBili  x   /  AST  19  /  ALT  37  /  AlkPhos  99  11-01    LIVER FUNCTIONS - ( 2023 12:56 )  Alb: 3.1 g/dL / Pro: 6.2 g/dL / ALK PHOS: 99 U/L / ALT: 37 U/L / AST: 19 U/L / GGT: x           PTT - ( 2023 01:38 )  PTT:58.4 sec        Urinalysis Basic - ( 2023 12:56 )    Color: x / Appearance: x / SG: x / pH: x  Gluc: 215 mg/dL / Ketone: x  / Bili: x / Urobili: x   Blood: x / Protein: x / Nitrite: x   Leuk Esterase: x / RBC: x / WBC x   Sq Epi: x / Non Sq Epi: x / Bacteria: x          Plan:  ====================== NEUROLOGY=====================  - continue to monitor mental status as per protocol     ==================== RESPIRATORY======================  - continue to monitor SpO2 with goal >94%    Mechanical Vent:     ====================CARDIOVASCULAR==================      ===================== RENAL =========================  - Continue monitoring urine output, lytes, SCr/ BUN  - replete lytes prn with goal K >4 and Mg >2    =============== GASTROINTESTINAL===================      ===================ENDO====================      ===================HEMATOLOGIC/ONC ===================  - Monitor H/H and plts  - DVT PPX:     ==================INFECTIOUS DISEASE================  - monitor and trend WBC and temperature curve     Dispo:    Patient requires continuous monitoring with bedside rhythm monitoring, arterial line, pulse oximetry, ventilator monitoring and intermittent blood gas analysis.  Care plan discussed with ICU care team.  I have spent 35 minutes providing critical care, in addition to initial critical time provided by CICU attending Dr. Garcia, re-evaluated multiple times during the day.    May Hanna PA-C Patient is a 58y old  Male who presents with a chief complaint of NSTEMI (2023 17:43)    INTERVAL HISTORY:  - milrinone d/c'd    SUBJECTIVE  - Patient seen and evaluated at bedside.     MEDICATIONS:  MEDICATIONS  (STANDING):  aspirin enteric coated 81 milliGRAM(s) Oral daily  atorvastatin 80 milliGRAM(s) Oral at bedtime  buMETAnide 2 milliGRAM(s) Oral daily  chlorhexidine 2% Cloths 1 Application(s) Topical daily  digoxin     Tablet 125 MICROGram(s) Oral daily  heparin  Infusion 1200 Unit(s)/Hr (12 mL/Hr) IV Continuous <Continuous>  hydrALAZINE 25 milliGRAM(s) Oral three times a day  insulin glargine Injectable (LANTUS) 26 Unit(s) SubCutaneous at bedtime  insulin lispro (ADMELOG) corrective regimen sliding scale   SubCutaneous three times a day before meals  insulin lispro (ADMELOG) corrective regimen sliding scale   SubCutaneous at bedtime  insulin lispro Injectable (ADMELOG) 10 Unit(s) SubCutaneous three times a day before meals  isosorbide   dinitrate Tablet (ISORDIL) 20 milliGRAM(s) Oral three times a day  pantoprazole    Tablet 40 milliGRAM(s) Oral before breakfast  senna 2 Tablet(s) Oral at bedtime  tamsulosin 0.4 milliGRAM(s) Oral at bedtime    MEDICATIONS  (PRN):  benzocaine/menthol Lozenge 1 Lozenge Oral every 2 hours PRN Sore Throat  polyethylene glycol 3350 17 Gram(s) Oral daily PRN Constipation      OBJECTIVE:  ICU Vital Signs Last 24 Hrs  T(C): 37.4 (2023 19:00), Max: 37.4 (2023 19:00)  T(F): 99.3 (2023 19:00), Max: 99.3 (2023 19:00)  HR: 103 (2023 19:00) (103 - 115)  ABP: 131/70 (2023 19:00) (100/55 - 146/77)  ABP(mean): 88 (2023 19:00) (71 - 99)  RR: 20 (2023 19:00) (12 - 49)  SpO2: 95% (2023 19:00) (91% - 100%)    O2 Parameters below as of 2023 19:00  Patient On (Oxygen Delivery Method): nasal cannula  O2 Flow (L/min): 3    Adult Advanced Hemodynamics Last 24 Hrs  CVP(mm Hg): 17 (2023 19:00) (6 - 18)  PA: 59/32 (2023 19:00) (45/19 - 81/49)  PA(mean): 43 (2023 19:00) (32 - 65)    CAPILLARY BLOOD GLUCOSE  POCT Blood Glucose.: 311 mg/dL (2023 18:38)  POCT Blood Glucose.: 200 mg/dL (2023 12:46)  POCT Blood Glucose.: 236 mg/dL (2023 08:47)  POCT Blood Glucose.: 213 mg/dL (31 Oct 2023 21:37)    CAPILLARY BLOOD GLUCOSE  POCT Blood Glucose.: 311 mg/dL (2023 18:38)    I&O's Summary    31 Oct 2023 07:01  -  2023 07:00  --------------------------------------------------------  IN: 1223.9 mL / OUT: 725 mL / NET: 498.9 mL    2023 07:01  -  2023 19:44  --------------------------------------------------------  IN: 499.8 mL / OUT: 200 mL / NET: 299.8 mL      Daily Weight in k.2 (2023 06:00)    PHYSICAL EXAM:  General: NAD  Chest: Clear to auscultation bilaterally; no rales, rhonchi, or wheezing  Heart: Regular rate and rhythm; normal S1 and S2; no murmurs, rubs, or gallops  Abd: Soft, nontender, nondistended  Nervous System: AAOX3  Ext: no peripheral LE edema bilaterally    LABS:  ABG - ( 2023 01:30 )  pH, Arterial: 7.40  pH, Blood: x     /  pCO2: 33    /  pO2: 95    / HCO3: 20    / Base Excess: -3.7  /  SaO2: 98.7                          10.6   10.16 )-----------( 184      ( 2023 12:56 )             34.6     11    133<L>  |  99  |  45<H>  ----------------------------<  215<H>  4.9   |  20<L>  |  2.17<H>    Ca    8.9      2023 12:56  Phos  5.1       Mg     2.4         TPro  6.2  /  Alb  3.1<L>  /  TBili  0.3  /  DBili  x   /  AST  19  /  ALT  37  /  AlkPhos  99      LIVER FUNCTIONS - ( 2023 12:56 )  Alb: 3.1 g/dL / Pro: 6.2 g/dL / ALK PHOS: 99 U/L / ALT: 37 U/L / AST: 19 U/L / GGT: x           PTT - ( 2023 01:38 )  PTT:58.4 sec    Urinalysis Basic - ( 2023 12:56 )    Color: x / Appearance: x / SG: x / pH: x  Gluc: 215 mg/dL / Ketone: x  / Bili: x / Urobili: x   Blood: x / Protein: x / Nitrite: x   Leuk Esterase: x / RBC: x / WBC x   Sq Epi: x / Non Sq Epi: x / Bacteria: x      ====================ASSESSMENT ==============  58M PMH CVA (mild left sided deficits), HTN, DM2, vertigo, HLD, Mobitz II s/pp Medtronic ICD (22) initially presented to OSH w/ SOB c/f ADHF vs. PNA, later found to have NSTEMI, transferred to [facility] for LHC. s/p LHC 10/16 w/ triple vessel disease, RHC on 10/19 for hemodynamic assessment, cMR w/ limited viability in LAD territory and TTE 10/28 w/ EF<20% and apical thrombus. Admitted to CICU for hypotension, inotropic support, hemodynamic monitoring. Previously downgraded on 10/22, RRT called while on the floor for AMS and hypotensive to 80/40, CTH 10/27 found w/  R cerebellar infarct, patient re-transferred to CICU for inotropic support.    Plan:  ====================== NEUROLOGY=====================  A&Ox2-3  - Hx of CVA w/ mild L residual deficit  - MR head 10/30 w/ R PICA infarct and L parietal infarct, likely due to embolic shower, per neuro. Mild hemorrhagic transformation in R PICA distribution   - MR neck showing occluded R-sided intracranial vertebral artery of unknown timeframe and mild focal stenosis of the R supraclinoid intracranial internal   carotid artery 2/2 atherosclerosis  - per neuro, c/w heparin gtt as repeat CT head w/ no hemorrhage  - CT head 10/27 w/ atrophy and small vessel white matter ischemic changes  - neurology following, appreciate reccs  - B12, TSH, RPR wnl  - c/w neuro checks, continue to monitor mental status as per protocol     ==================== RESPIRATORY======================  Pulmonary edema  - likely iso CHF vs. superimposed PNA  - CT chest 10/28: scattered patchy and nodular bilateral upper lobe predominant GGO w/ pulmonary edema  - c/w Bumex 2 qd   - continue to monitor SpO2 with goal >94%    ====================CARDIOVASCULAR==================  Cardiogenic shock  - milrinone d/c'd  in AM  - R susi mckeon 10/30: RA 14/13/12, RV 51/14, PA 47/34/39, PCW 34/36/32, CI 2.5/CO 4.95   - c/w bumex 2 mg PO qd  - c/w hydral 25 tid and Isordil 20 for afterload reduction  - presently net positive, consider further bumex as BP and Cr allows  - pending HF recs regarding high risk PCI vs further intervention; EP recommended no indication for upgrade at this time. HF holding off on launching VAD/transplant eval  - s/p dig load, dig level 1.4, acceptable per HF     Multivessel CAD  - [facility] 10/16: 95% pLAD, 80% mid and distal LCx, 90% mid and distal RCA  - per CT surg, pt is not a good candidate for CABG given lack of viability in the LAD territory on cMRI    LV apical thrombus  - c/w heparin gtt  - MRI brain and MRA H/N showing 2 infarcts  - neuro checks    Tachycardia  - Mobitz II s/p AICD  - Sinus tach vs. atach vs. aflutter. Iso increased cardiac demand/ hypoxia vs. iso infxn?  - per EP, appears sinus tach, no indication for cardioversion/EMILEE  - s/p dig load, dig level 1.4, acceptable per HF   - per EP, no need to upgrade device at this time    HLD  - c/w Atorv 80mg     ===================== RENAL =========================  LAURA  - sCr on admission 1.28, stable today although remains elevated, ddx: iso congestion vs. prerenal from diuresis  - Continue monitoring urine output, lytes, SCr/ BUN  - replete lytes prn with goal K >4 and Mg >2    BPH  - c/w Flomax .4    =============== GASTROINTESTINAL===================  No active issues  - CC DASH Halal diet  - GI ppx: PPI 40mg daily  - c/w Bowel regimen    ===================ENDO====================  DM2  - A1c 10/14 8.4%  - c/w Lantus 26u qhs + lispro 10u premeal + ISS  - trend FS    Thyroid nodule  - CT chest: 3. 3 x 1.8 cm left supraclavicular nodule likely arising from the thyroid gland   - thyroid US outpatient    ===================HEMATOLOGIC/ONC ===================  H/H & plts stable  - Monitor H/H and plts  - DVT PPX: heparin gtt    ==================INFECTIOUS DISEASE================  Afebrile  - no leukocytosis  - monitor and trend WBC and temperature curve off abx at this time    Dispo: Maintain in ICU while P[place] in place    Patient requires continuous monitoring with bedside rhythm monitoring, arterial line, pulse oximetry, ventilator monitoring and intermittent blood gas analysis.  Care plan discussed with ICU care team.  I have spent 35 minutes providing critical care, in addition to initial critical time provided by CICU attending Dr. Garcia, re-evaluated multiple times during the day.    May Hanna PA-C

## 2023-11-01 NOTE — PROGRESS NOTE ADULT - PROBLEM SELECTOR PLAN 2
- On ASA/statin, cMRI with no viability in LAD territory  - Deemed too high risk for CABG  - Possible PCI can be considered if improved in the future, however now w/ an LV thrombus, limited support options, and confusion/stroke

## 2023-11-01 NOTE — PROGRESS NOTE ADULT - SUBJECTIVE AND OBJECTIVE BOX
Behavioral Cardiology Progress Note     History of present illness: Mr. Carlson is a 58 year-old man, history of prior stroke with mild deficits (improved), HTN, DM2, vertigo, HLD, Mobitz II s/p Medtronic dual chamber ICD (22) initially presented to [facility] with dyspnea and chest pain, admitted w/ acute hypoxic respiratory failure likely due to acute pulmonary edema due to acute HFrEF in setting of acute NSTEMI, LAURA and enterovirus infection, treated for PNA with cefepime. Had TTE 23 showing EF 20% with severely decreased LVSF, multiple regional wall motion abnormalities, and elevated LV end diastolic pressure. Pt was transferred to [facility] for cardiac eval. Seen by neurology. Psychology consult requested for cognitive assessment.     Social history: Information obtained from patient’s wife, Mame.  22 years, no children. Domiciled with wife in private house in [place]. Born in [place], came to [place] ago, [place]. Parents . Has 9 siblings living in [place]. 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 For the past 2 years was working as an [employer] , stopped working  when he developed chest pain and was hospitalized at [facility].      Substance use: (As per wife).   •	Tobacco: none   •	Alcohol: none  •	Drug: none     Adherence to treatment guidelines:   •	Medication: As per wife, patient manages his own medications independently. Also manages his diabetes, checks finger sticks which have been in normal range.   •	Low Na diet: Wife and patient prepare meals, follow diabetic and low Na diet.       Past psychiatric history: As per wife, patient never treated for psychiatric illness.     Psychological assessment: Patient seen resting in bed, awake, alert. Able to identify date, month, year with some effort. Could not recall reasons for hospitalization or name of hospital. Recalled meeting this writer but not the specialty. Expressed frustration with his speech and stated "I feel disconnected" when trying to talk. Reports word finding difficulty that began 5-6 months ago. Able to identify and name current president, stated he knew the previous president "I know it but can't say it." Is aware of his speech difficulties, asked this writer to find a pill to help him. Of note, MRI done 10/31/23 reports R cerebellar/PICA infarct as well as L parietal embolic infarct, likely cardioembolic.      Risk assessment: Low acute risk.   Risk factors: Chronic health issue, age.    Protective factors: Strong family support, no history of s/a. no family history of s/a, no depression, no anxiety.     Mental status exam: Patient seen lying in bed on CICU. Somnolent, responsive when name called but not able to maintain focus/attention. Unable to identify name of hospital, date, reasons for hospitalization. Reports feeling “sleepy.” Denies poor sleep last night. Poor insight into disease. Limited judgment.     Dx: Adjustment disorder, unspecified F43.20     Differential diagnosis: delirium vs dementia vs neuro event (MRI from 10/31/23 confirms "R cerebellar/PICA infarct as well as L parietal embolic infarct, likely cardioembolic."     Recommendations:  ·	Neuropsychological testing to further evaluate cognition   ·	Maintain sleep wake cycle   ·	Frequently reorient to time/place   ·	May benefit from psychiatric consult     18 minutest spent on patient encounter    Behavioral Cardiology Progress Note     History of present illness: Mr. Carlson is a 58 year-old man, history of prior stroke with mild deficits (improved), HTN, DM2, vertigo, HLD, Mobitz II s/p Medtronic dual chamber ICD (22) initially presented to [facility] with dyspnea and chest pain, admitted w/ acute hypoxic respiratory failure likely due to acute pulmonary edema due to acute HFrEF in setting of acute NSTEMI, LAURA and enterovirus infection, treated for PNA with cefepime. Had TTE 23 showing EF 20% with severely decreased LVSF, multiple regional wall motion abnormalities, and elevated LV end diastolic pressure. Pt was transferred to [facility] for cardiac eval. Seen by neurology. Psychology consult requested for cognitive assessment.     Social history: Information obtained from patient’s wife, Mame.  22 years, no children. Domiciled with wife in private house in [place]. Born in [place], [place], [place]. Parents . Has 9 siblings living in [place]. 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 For the past 2 years was working as an [employer] , stopped working  when he developed chest pain and was hospitalized at [facility].      Substance use: (As per wife).   •	Tobacco: none   •	Alcohol: none  •	Drug: none     Adherence to treatment guidelines:   •	Medication: As per wife, patient manages his own medications independently. Also manages his diabetes, checks finger sticks which have been in normal range.   •	Low Na diet: Wife and patient prepare meals, follow diabetic and low Na diet.       Past psychiatric history: As per wife, patient never treated for psychiatric illness.     Psychological assessment: Patient seen resting in bed, awake, alert. Able to identify date, month, year with some effort. Could not recall reasons for hospitalization or name of hospital. Recalled meeting this writer but not the specialty. Expressed frustration with his speech and stated "I feel disconnected" when trying to talk. Reports word finding difficulty that began 5-6 months ago. Able to identify and name current president, stated he knew the previous president "I know it but can't say it." Is aware of his speech difficulties, asked this writer to find a pill to help him. Of note, MRI done 10/31/23 reports R cerebellar/PICA infarct as well as L parietal embolic infarct, likely cardioembolic. Discussed with patient and wife benefits of neuropsychologically testing.      Risk assessment: Low acute risk.   Risk factors: Chronic health issue, age.    Protective factors: Strong family support, no history of s/a. no family history of s/a, no depression, no anxiety.     Mental status exam: Patient seen lying in bed on CICU. Somnolent, responsive when name called but not able to maintain focus/attention. Unable to identify name of hospital, date, reasons for hospitalization. Reports feeling “sleepy.” Denies poor sleep last night. Poor insight into disease. Limited judgment.     Dx: Adjustment disorder, unspecified F43.20     Differential diagnosis: delirium vs dementia vs neuro event (MRI from 10/31/23 confirms "R cerebellar/PICA infarct as well as L parietal embolic infarct, likely cardioembolic."     Recommendations:  ·	Neuropsychological testing to further evaluate cognition   ·	Maintain sleep wake cycle   ·	Frequently reorient to time/place   ·	May benefit from psychiatric consult     38 minutes spent on patient encounter

## 2023-11-01 NOTE — PROGRESS NOTE ADULT - SUBJECTIVE AND OBJECTIVE BOX
Neurology        S: patient seen and examined. MRI confirms embolic infarcts           Medications: MEDICATIONS  (STANDING):  aspirin enteric coated 81 milliGRAM(s) Oral daily  atorvastatin 80 milliGRAM(s) Oral at bedtime  buMETAnide 2 milliGRAM(s) Oral daily  chlorhexidine 2% Cloths 1 Application(s) Topical daily  digoxin     Tablet 125 MICROGram(s) Oral daily  heparin  Infusion 1200 Unit(s)/Hr (12 mL/Hr) IV Continuous <Continuous>  hydrALAZINE 25 milliGRAM(s) Oral three times a day  insulin glargine Injectable (LANTUS) 26 Unit(s) SubCutaneous at bedtime  insulin lispro (ADMELOG) corrective regimen sliding scale   SubCutaneous three times a day before meals  insulin lispro (ADMELOG) corrective regimen sliding scale   SubCutaneous at bedtime  insulin lispro Injectable (ADMELOG) 10 Unit(s) SubCutaneous three times a day before meals  milrinone Infusion 0.125 MICROgram(s)/kG/Min (2.63 mL/Hr) IV Continuous <Continuous>  pantoprazole    Tablet 40 milliGRAM(s) Oral before breakfast  senna 2 Tablet(s) Oral at bedtime  tamsulosin 0.4 milliGRAM(s) Oral at bedtime    MEDICATIONS  (PRN):  benzocaine/menthol Lozenge 1 Lozenge Oral every 2 hours PRN Sore Throat  polyethylene glycol 3350 17 Gram(s) Oral daily PRN Constipation       Vitals:  Vital Signs Last 24 Hrs  T(C): 37 (01 Nov 2023 07:00), Max: 37.1 (31 Oct 2023 20:00)  T(F): 98.6 (01 Nov 2023 07:00), Max: 98.7 (31 Oct 2023 20:00)  HR: 109 (01 Nov 2023 09:00) (105 - 121)  BP: --  BP(mean): --  RR: 34 (01 Nov 2023 09:00) (12 - 34)  SpO2: 94% (01 Nov 2023 09:00) (91% - 100%)    Parameters below as of 01 Nov 2023 09:00  Patient On (Oxygen Delivery Method): room air                General Exam:   General Appearance: Appropriately dressed and in no acute distress       Head: Normocephalic, atraumatic and no dysmorphic features  Ear, Nose, and Throat: Moist mucous membranes  CVS: S1S2+  Resp: No SOB, no wheeze or rhonchi  GI: soft NT/ND  Extremities: No edema or cyanosis  Skin: No bruises or rashes     Neurological Exam:  Mental Status: Awake, alert and oriented x 2.  Able to follow simple verbal commands. Able to name and repeat. fluent speech. No obvious aphasia or dysarthria noted. poor insight. not understanding why he is in hospital   Cranial Nerves: PERRL, EOMI, VFFC, sensation V1-V3 intact,  no obvious facial asymmetry, equal elevation of palate, scm/trap 5/5, tongue is midline on protrusion. no obvious papilledema on fundoscopic exam. hearing is grossly intact.   Motor: Normal bulk, tone and strength throughout. Fine finger movements were intact and symmetric. no tremors or drift noted.    Sensation: Intact to light touch and pinprick throughout. no right/left confusion. no extinction to tactile on DSS. Romberg was negative.   Reflexes: 1+ throughout at biceps, brachioradialis, triceps, patellars and ankles bilaterally and equal. No clonus. R toe and L toe were both downgoing.  Coordination: No dysmetria on FNF or HKS  Gait:   no limitations in gait.     Data/Labs/Imaging which I personally reviewed.     Labs:     LABS:                          10.5   9.45  )-----------( 202      ( 01 Nov 2023 01:38 )             33.9     11-01    132<L>  |  98  |  42<H>  ----------------------------<  232<H>  4.7   |  18<L>  |  2.15<H>    Ca    9.1      01 Nov 2023 01:38  Phos  5.2     11-01  Mg     1.9     11-01    TPro  6.5  /  Alb  3.0<L>  /  TBili  0.3  /  DBili  x   /  AST  30  /  ALT  40  /  AlkPhos  107  11-01    LIVER FUNCTIONS - ( 01 Nov 2023 01:38 )  Alb: 3.0 g/dL / Pro: 6.5 g/dL / ALK PHOS: 107 U/L / ALT: 40 U/L / AST: 30 U/L / GGT: x           PTT - ( 01 Nov 2023 01:38 )  PTT:58.4 sec  Urinalysis Basic - ( 01 Nov 2023 01:38 )    Color: x / Appearance: x / SG: x / pH: x  Gluc: 232 mg/dL / Ketone: x  / Bili: x / Urobili: x   Blood: x / Protein: x / Nitrite: x   Leuk Esterase: x / RBC: x / WBC x   Sq Epi: x / Non Sq Epi: x / Bacteria: x            < from: CT Head No Cont (10.27.23 @ 18:04) >    ACC: [id number] EXAM:  CT BRAIN   ORDERED BY: BRETT HOPSON     PROCEDURE DATE:  10/27/2023          INTERPRETATION:  Clinical Indication: CVA    5mm axial sections of the brain were obtained from base to vertex,   without the intravenous administration of contrast material. Coronal and   sagittal computer generated reconstructed views are available.    No prior brain imaging is available for comparison.          The ventricles and sulci are prominent consistent with mild atrophy.   Small vesselwhite matter ischemic changes are noted. There is a infarct   in the right medial cerebellum of undetermined age which could be acute   to subacute based on its degree of lucency. There is no significant   hemorrhage. Bone window examination is unremarkable. There is been   previous right-sided lens replacement surgery.      IMPRESSION: Atrophy and small vessel white matter ischemic changes. Right   medial cerebellar infarct may be acute versus subacute. No hemorrhage.      --- End of Report ---    < from: MR Angio Head No Cont (10.30.23 @ 20:58) >    ACC: [id number] EXAM:  MR ANGIO BRAIN   ORDERED BY: NATALIE CARRANZA     ACC: [id number] EXAM:  MR ANGIO NECK   ORDERED BY: NATALIE CARRANZA     ACC: [id number] EXAM:  MR BRAIN   ORDERED BY: NAVNEET CORONADO     PROCEDURE DATE:  10/30/2023          INTERPRETATION:  .    CLINICAL INFORMATION: Stroke.    TECHNIQUE: Multiplanar multi sequential MRI examination of the brain was   performed without the administration of IV gadolinium. MRA images through   the neck and [place] of [place] were obtained usinga combination of 2-D   and 3-D time-of-flight acquisition. The data was then reformatted into a   volumetric data set and reviewed as rotational MIP images.    COMPARISON: Most recent prior CT examination of the head from 10/27/2023.   No prior MRI studies are available for comparison.    FINDINGS:    MRI Brain: A wedge-shaped area of diffusion restriction is seen within   the right PICA territory. Associated T2/FLAIR hyperintense signal is also   seen, compatible with cytotoxic edema. Mild hemorrhagic transformation is   seen within the infarct bed manifested by high signal on T1-weighted   imaging as well as susceptibility artifact on the SWI sequence. Mild mass   effect is seen without effacement of the fourth ventricle or shift of the   posterior fossa midline structures.    This is superimposed upon encephalomalacia and gliosis involving the   right cerebellar hemisphere.    An additional vague area of diffusion reduction is seen within the left   parietal periventricular white matter without associated T2/FLAIR   hyperintense signal, compatible with cytotoxic edema. No gross   hemorrhagic transformation is noted in this location.    Scattered foci of susceptibility artifact are seen within the bilateral   basal ganglia, compatible with areas of chronic microhemorrhage.    Multiple additional patchy confluent nonspecific T2/FLAIR hyperintense   signal changes are noted throughout the bihemispheric white matter   without associated mass effect or restricted diffusion.    Ventricular size and configuration is unremarkable. No abnormal   extra-axial fluid collections are seen. Flow-voids are noted throughout   the major intracranial vessels, on the T2 weighted images, consistent   with their patency. The sellar location appears unremarkable. There is an   unchanged appearing small retrocerebellar arachnoid cyst versus cisterna   magna.    A polyp versus retention cyst is seen within the right maxillary sinus.   Additional minor scattered mucosal thickening seen throughout the ethmoid   labyrinth. The tympanomastoid cavities are clear. The left orbit appears   within normal limits. There is evidence of right-sided cataract removal.    There is an indeterminate mixed signal ovoid shaped soft tissue focus   involving the left superior pinna measuring 1.4 x 0.9 cm which appears   unchanged.    MRA Neck: Examination is motion limited.    There is a standard anatomic variation to the aortic arch. The origins of   the great vessels appear grossly unremarkable.    The bilateral common carotid arteries, carotid bifurcations and cervical   internal carotid arteries appear patent. The origin of the left vertebral   artery is normal. The left vertebral artery is patent.    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    MRA [place] of [place]: Atherosclerosis affects the bilateral carotid   siphons with mild area of focal stenosis involving the right   clinoid/supraclinoid junction. There is mild hypoplasia of the right A1   segment. Otherwise, the bilateral intracranial internal carotid,   anterior, and middle cerebral arteries appear unremarkable.    The anterior and bilateral posterior communicating arteries are seen.    The right V4 segment does not demonstrate flow relatedsignal. The left   V4 segment appears unremarkable. The vertebrobasilar confluence appears   unremarkable. Long segment mild stenosis involving the mid basilar   artery. The basilar tip appears unremarkable as well as the bilateral   posterior cerebral arteries.    IMPRESSION:    MRI BRAIN:  1. Evolving acute/subacute right-sided PICA distribution infarction with   associated cytotoxic edema and very mild hemorrhagic transformation.  2. Additional wedge-shaped area of acute/subacute ischemia within the   left parietal periatrial white matter with associated cytotoxic edema.  3. Indeterminate soft tissue lesion involving the left superior pinna   measuring 1.4 x 0.9 cm. Neoplasm cannot be excluded. Recommend   correlation with direct physical examination and visualization.    MRA NECK:  1. Extremely motion limited study.  2. 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 Recommend further evaluation with a CT angiogram study   of the neck.    MRA HEAD:  1. 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 This can be further evaluated with a CT angiogram study of the   head.  2. Mild focal stenosis of the right supraclinoid intracranial internal   carotid artery secondary to atherosclerosis.  3. Otherwise, no large vessel occlusion or major stenosis.    --- End of Report ---            LAKESHA DIAZ MD; Attending Radiologist  This document has been electronically signed. Oct 30 2023  9:20PM    < end of copied text >

## 2023-11-01 NOTE — PROGRESS NOTE ADULT - ASSESSMENT
"Patient refused night time PO meds stating, " I will just throw them up." Patient has had one instance of emeses tonight but denies nausea. Will continue to monitor.   " 59 YO M with a history of CVA with residual mild R paresthesias, second degree Mobitz II heart block s/p DC-ICD 12/2022 (initial concern for sarcoid but negative biopsy/PET post procedure), DM2 (A1c 8.4%), and HTN who was admitted to [facility] with chest pain and dyspnea, found to have NSTEMI with markedly elevated troponins and new severe LV dysfunction with regional wall motion abnormalities as well as + enterovirus. He required NIPPV and was diuresed before being transferred to [facility] where LHC performed which revealed severe 3v CAD with critical proximal LAD involvement. CTS was consulted for CABG evaluation and HF consulted for comanagement.    He ultimately underwent RHC with revealed elevated wedge but normal cardiac output. He was transferred to CICU for swan placement and closer observation of hemodynamics. Found to have LV thrombus. Given concern for low flow status thus sent back to CCU 10/28. He is confused and was found to have R medial cerebellar infarct on head CT, with MRI showing evolving PICA stroke. Given evolving stroke, brain lesion and ongoing confusion he's not currently a candidate for coronary intervention. Additionally, per CTS review, no LAD viability. On reduced dose of milrinone his HR has come down to low 100s from 120s. His Cr remains stable, but elevated from a few days ago. His BP is borderline but with mVO2 of 63%, corresponding to CI 2.4 this morning. Bedside hemodynamics this morning showing elevated CVP and mild increase in PA pressures with MAPS 71-83 on jennifer. CXR with increased congestion. Will continue to wean milrinone with aggressive diuresis and afterload reduction.    REVIEW OF STUDIES  TTE 10/26: EF <20% LVT present, small pericardial effusion w/o tamponade  RHC 10/19: RA 10, RV 47/9/13, Wedge 29, PA 41/26/33, CO/CI 4.4/2.3, PA sat 57.3  EKG: sinus tachycardia, septal q-waves, left axis deviation   TTE 10/16/23: LV 5.5 cm, LVEF 20% with regional WMAs worse in the apex, LVOT VTI 8 cm, normal RV size/function, severe functional MR, small pericardial effusion, estimated RA pressure 8 mmHg   TTE 12/2022: normal LVEF   [facility]: 95% discrete proximal LAD disease and sequential multifocal disease with good distal target, 80-90% proximal LCx disease just prior to marginals, severe multifocal RCA disease with good targets     Hemodynamics:  11/1/23 CVP 13, PA 48/23/35, mVO2 62.6%, Cris CI 2.4  10/21/23: RA 13 with V-wave 21, PA 50/33, PCW 33, MvO2 68.2, Cris CO/CI 4.4/2.56

## 2023-11-01 NOTE — PROGRESS NOTE ADULT - SUBJECTIVE AND OBJECTIVE BOX
ADVANCED HEART FAILURE & TRANSPLANT  - PROGRESS NOTE  *To reach the NS2 Team from 8am to 5pm, please call [phone]   ___________________________________________________________________________  Subjective:  - no acute events overnight  - notes some SOB, difficulty lying flat    Medications:  aspirin enteric coated 81 milliGRAM(s) Oral daily  atorvastatin 80 milliGRAM(s) Oral at bedtime  benzocaine/menthol Lozenge 1 Lozenge Oral every 2 hours PRN  buMETAnide 2 milliGRAM(s) Oral daily  chlorhexidine 2% Cloths 1 Application(s) Topical daily  digoxin     Tablet 125 MICROGram(s) Oral daily  heparin  Infusion 1200 Unit(s)/Hr IV Continuous <Continuous>  hydrALAZINE 25 milliGRAM(s) Oral three times a day  insulin glargine Injectable (LANTUS) 26 Unit(s) SubCutaneous at bedtime  insulin lispro (ADMELOG) corrective regimen sliding scale   SubCutaneous three times a day before meals  insulin lispro (ADMELOG) corrective regimen sliding scale   SubCutaneous at bedtime  insulin lispro Injectable (ADMELOG) 10 Unit(s) SubCutaneous three times a day before meals  pantoprazole    Tablet 40 milliGRAM(s) Oral before breakfast  polyethylene glycol 3350 17 Gram(s) Oral daily PRN  senna 2 Tablet(s) Oral at bedtime  tamsulosin 0.4 milliGRAM(s) Oral at bedtime      Physical Exam:    Vitals:  Vital Signs Last 24 Hours  T(C): 37.3 (23 @ 15:00), Max: 37.3 (23 @ 15:00)  HR: 104 (23 @ 17:00) (103 - 116)  BP: 100//63  RR: 41 (23 @ 17:00) (12 - 47)  SpO2: 95% (23 @ 17:00) (91% - 100%)    Weight in k.2 ( @ 06:00)    I&O's Summary    31 Oct 2023 07:01  -  2023 07:00  --------------------------------------------------------  IN: 1223.9 mL / OUT: 725 mL / NET: 498.9 mL    2023 07:01  -  2023 17:43  --------------------------------------------------------  IN: 475.8 mL / OUT: 200 mL / NET: 275.8 mL    Tele: ST    General: No distress. Comfortable. Tachypneic  HEENT: EOM intact.  Neck: Neck supple. JVP mildly elevated. No masses  Chest: Clear to auscultation bilaterally  CV: Normal S1 and S2. No murmurs, rub, or gallops. Radial pulses normal. No LE edema.  Abdomen: Soft, non-distended, non-tender  Skin: No rashes or skin breakdown. Warm peripherally  Neurology: Alert and oriented times three. Sensation intact  Psych: Affect normal    Labs:                        10.6   10.16 )-----------( 184      ( 2023 12:56 )             34.6         133<L>  |  99  |  45<H>  ----------------------------<  215<H>  4.9   |  20<L>  |  2.17<H>    Ca    8.9      2023 12:56  Phos  5.1       Mg     2.4         TPro  6.2  /  Alb  3.1<L>  /  TBili  0.3  /  DBili  x   /  AST  19  /  ALT  37  /  AlkPhos  99      PTT - ( 2023 01:38 )  PTT:58.4 sec    Oxygen Saturation, Mixed: 54.9 ( @ 12:50)  Oxygen Saturation, Mixed: 62.6 ( @ 01:30)  Oxygen Saturation, Mixed: 54.5 (10-31 @ 18:00)  Oxygen Saturation, Mixed: 58.1 (10-31 @ 15:15)  Oxygen Saturation, Mixed: 59.1 (10-31 @ 00:04)    Lactate, Blood: 1.8 mmol/L (10-31 @ 18:09)  Lactate, Blood: 1.7 mmol/L (10-31 @ 15:22)  Lactate, Blood: 1.1 mmol/L (10-31 @ 00:22)  Lactate, Blood: 1.2 mmol/L (10-30 @ 05:08)

## 2023-11-01 NOTE — PROGRESS NOTE ADULT - ASSESSMENT
====================ASSESSMENT ==============  57 yo Male pmhx CVA with mild left sided deficits, HTN, DM2, vertigo, HLD, Mobitz II s/pp Medtronic dual chamber ICD (12/21/22) initially presented to OSH for SOB c/f ADHF vs. PNA, later found to have NSTEMI transferred to [facility] for C evaluation. 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 Admitted to CICU for hypotension, inotropic support, hemodynamic monitoring. Previously downgraded on 10/22, RRT called while on the floor for AMS and hypotensive to 80/40, CTH 10/27 found possible R cerebellar infarct, patient re-transferred to CICU for inotropic support.    NEUROLOGICAL  #L Parietal and R PICA infarct/ embolic shower  #Encephalopathy   #Hx of CVA w/ mild L residual deficit  - currently A&Ox2-3  - MR head 10/30 w/ R PICA infarct and L parietal infarct, likely due to embolic shower, per neuro. Very mild hemorrhagic transformation in R PICA distribution   - MR neck showing occluded R-sided intracranial vertebral artery of unknown timeframe and mild focal stenosis of the R supraclinoid intracranial internal   carotid artery secondary to atherosclerosis  - per neuro, c/w heparin gtt as repeat CT head w/ no hemorrhage  - CT head 10/27 w/ atrophy and small vessel white matter ischemic changes  - neurology following  - B12, TSH, RPR wnl  - neuro checks    CARDIOVASCULAR  #Hypotension/cardiogenic shock, iso acute on chronic HFrEF  TTE 10/16: EF 20%, severely reduced LVSF  LHC 10/16: 95% pLAD, 80% mid and distal LCx, 90% mid and distal RCA  RHC 10/19: RA 10, PCWP 20, CO 4.4, CI 2.3  cMR 10/18: limited viability in LAD territory  TTE 10/26: EF <20 %, LV apical thrombus is seen. Small pericardial effusion noted adjacent to the right ventricle with no evidence of hemodynamic compromise.  -R groin swan placed 10/30: RA 14/13/12, RV 51/14, PA 47/34/39, PCW 34/36/32, CI 2.5/CO 4.95   - RRT called for 80/40 while on the floor, milrinone 0.125 restarted on 10/28  - trend lactate, proBNP  - c/w bumex 2 mg PO qd  -s/p bumex 2 ON  - presently net positive, consider further bumex as BP and Cr allows  - pending HF recs regarding high risk PCI vs further intervention; EP recommended no indication for upgrade at this time. HF holding off on launching VAD/transplant eval  - hold Entresto i/s/o hypotension  - off hydral/nitrates  - s/p dig load, dig level 1.4, acceptable per HF   - f/u device interrogation  - avoid BB/CCB given borderline cardiac function  - start GDMT when appropriate    #LV apical thrombus  -c/w heparin gtt  -MRI brain and MRA H/N showing 2 infarcts  -neuro checks  -not a candidate for impella assisted PCI    #Tachycardia  Mobitz II s/p AICD  - Sinus tach vs. atach vs. aflutter. Iso increased cardiac demand/ hypoxia vs. iso infxn?  - per EP, appears sinus tach, no indication for cardioversion/EMILEE  - s/p dig load, dig level 1.4, acceptable per HF   - per EP, no need to upgrade device at this time    #HLD  - c/w Atorv 80mg     RESPIRATORY  #Pulmonary edema  Likely iso CHF vs. superimposed PNA  CT chest 10/28: scattered patchy and nodular bilateral upper lobe predominant groundglass opacities with interlobular septal thickening representing pulmonary edema  -c/w Bumex 2 qd   -infectious cause not excluded, low threshold to add abx if leukocytosis or febrile, as pt persistently tachy  -f/u noncontrast CT chest in 1-3 month to ensure resolution    GI/NUTRITION  No active issues  - CC DASH Halal diet  - GI ppx: PPI 40mg daily  - c/w Bowel regimen    /RENAL  #LAURA:   - sCr on admission 1.28, uptrending today, likely prerenal iso diuresis   -consider urine lytes  -Trend sCr  -Monitor I/O  -c/w diuresis    #Elevated lactate - Improving  - uptrending lactate likely iso cardiogenic shock  - now at normal range  - c/w Milrinone, wean as tolerated  - continue to trend    #BPH  - c/w Flomax 0.4mg qhs    SKIN  - no active issues    INFECTIOUS DISEASE  - No active issues  - plan for pulmonary edema as above  - Recent admission to OSH for ?PNA s/p 14 days of cefepime (last dose 10/14)  - Currently no signs of infection, WBC normal, afebrile, although monitor iso persistent tachycardia    ENDOCRINE    #DM2  - A1c 10/14 8.4%  - c/w Lantus 26u qhs + lispro 10u premeal + ISS  - trend FS, currently at goal    #Thyroid nodule  CT chest: 3. 3 x 1.8 cm left supraclavicular nodule likely arising from the thyroid gland   -thyroid US outpatient    HEMATOLOGIC/DVT PPX  - No active issues  - DVT ppx: heparin gtt    #ETHICS  Full code ====================ASSESSMENT ==============  59 yo Male pmhx CVA with mild left sided deficits, HTN, DM2, vertigo, HLD, Mobitz II s/pp Medtronic dual chamber ICD (12/21/22) initially presented to OSH for SOB c/f ADHF vs. PNA, later found to have NSTEMI transferred to [facility] for C evaluation. 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 Admitted to CICU for hypotension, inotropic support, hemodynamic monitoring. Previously downgraded on 10/22, RRT called while on the floor for AMS and hypotensive to 80/40, CTH 10/27 found possible R cerebellar infarct, patient re-transferred to CICU for inotropic support.    NEUROLOGICAL  #L Parietal and R PICA infarct/ embolic shower  #Encephalopathy   #Hx of CVA w/ mild L residual deficit  - currently A&Ox2-3  - MR head 10/30 w/ R PICA infarct and L parietal infarct, likely due to embolic shower, per neuro. Very mild hemorrhagic transformation in R PICA distribution   - MR neck showing occluded R-sided intracranial vertebral artery of unknown timeframe and mild focal stenosis of the R supraclinoid intracranial internal   carotid artery secondary to atherosclerosis  - per neuro, c/w heparin gtt as repeat CT head w/ no hemorrhage  - CT head 10/27 w/ atrophy and small vessel white matter ischemic changes  - neurology following  - B12, TSH, RPR wnl  - neuro checks    CARDIOVASCULAR  #Hypotension/cardiogenic shock, iso acute on chronic HFrEF  TTE 10/16: EF 20%, severely reduced LVSF  LHC 10/16: 95% pLAD, 80% mid and distal LCx, 90% mid and distal RCA  RHC 10/19: RA 10, PCWP 20, CO 4.4, CI 2.3  cMR 10/18: limited viability in LAD territory  TTE 10/26: EF <20 %, LV apical thrombus is seen. Small pericardial effusion noted adjacent to the right ventricle with no evidence of hemodynamic compromise.  -R groin swan placed 10/30: RA 14/13/12, RV 51/14, PA 47/34/39, PCW 34/36/32, CI 2.5/CO 4.95   - RRT called for 80/40 while on the floor, milrinone 0.125 restarted on 10/28  - trend lactate, proBNP  - c/w bumex 2 mg PO qd  -s/p bumex 2 ON  - presently net positive, consider further bumex as BP and Cr allows  - pending HF recs regarding high risk PCI vs further intervention; EP recommended no indication for upgrade at this time. HF holding off on launching VAD/transplant eval  - hold Entresto i/s/o hypotension  -c/w hydral 25 tid  - s/p dig load, dig level 1.4, acceptable per HF   - f/u device interrogation  - avoid BB/CCB given borderline cardiac function  - start GDMT when appropriate    #Triple vessel disease  [facility] 10/16: 95% pLAD, 80% mid and distal LCx, 90% mid and distal RCA  -per CT surg, pt is not a good candidate for CABG given lack of viability in the LAD territory  -pending HF AT eval    #LV apical thrombus  -c/w heparin gtt  -MRI brain and MRA H/N showing 2 infarcts  -neuro checks  -not a candidate for impella assisted PCI    #Tachycardia  Mobitz II s/p AICD  - Sinus tach vs. atach vs. aflutter. Iso increased cardiac demand/ hypoxia vs. iso infxn?  - per EP, appears sinus tach, no indication for cardioversion/EMILEE  - s/p dig load, dig level 1.4, acceptable per HF   - per EP, no need to upgrade device at this time    #HLD  - c/w Atorv 80mg     RESPIRATORY  #Pulmonary edema  Likely iso CHF vs. superimposed PNA  CT chest 10/28: scattered patchy and nodular bilateral upper lobe predominant groundglass opacities with interlobular septal thickening representing pulmonary edema  -c/w Bumex 2 qd   -infectious cause not excluded, low threshold to add abx if leukocytosis or febrile, as pt persistently tachy  -f/u noncontrast CT chest in 1-3 month to ensure resolution    GI/NUTRITION  No active issues  - CC DASH Halal diet  - GI ppx: PPI 40mg daily  - c/w Bowel regimen    /RENAL  #LAURA:   - sCr on admission 1.28, stable today although remains elevated, ddx: iso congestion vs. prerenal from diuresis  -consider urine lytes  -Trend sCr  -Monitor I/O  -c/w diuresis    #Elevated lactate - Improving  - uptrending lactate likely iso cardiogenic shock  - now at normal range  - c/w Milrinone, wean as tolerated  - continue to trend    #BPH  - c/w Flomax 0.4mg qhs    SKIN  - no active issues    INFECTIOUS DISEASE  - No active issues  - plan for pulmonary edema as above  - Recent admission to OSH for ?PNA s/p 14 days of cefepime (last dose 10/14)  - Currently no signs of infection, WBC normal, afebrile, although monitor iso persistent tachycardia    ENDOCRINE    #DM2  - A1c 10/14 8.4%  - c/w Lantus 26u qhs + lispro 10u premeal + ISS  - trend FS, currently at goal    #Thyroid nodule  CT chest: 3. 3 x 1.8 cm left supraclavicular nodule likely arising from the thyroid gland   -thyroid US outpatient    HEMATOLOGIC/DVT PPX  - No active issues  - DVT ppx: heparin gtt    #ETHICS  Full code

## 2023-11-01 NOTE — PROGRESS NOTE ADULT - ASSESSMENT
59 yo Male with prior Stroke with mild ?left sided deficits (improved), HTN, DM2, vertigo, HLD, Mobitz II s/pp Medtronic dual chamber ICD (12/21/22) initially presented to [facility] from home with complaints of dyspnea and chest pain and was admitted w/ acute hypoxic respiratory failure likely due to acute pulmonary edema due to acute HFrEF in setting of acute NSTEMI, LAURA and enterovirus infection. Pt with brief MICU stay at [facility] requiring bipap (no intubation), was treated for PNA with cefepime, and was found to have TTE done 9/26/23 showing EF 20% with severely decreased LVSF, multiple regional wall motion abnormalities, and elevated LV end diastolic pressure. Pt was transferred to [facility] for cardiac eval.   TTE EF < 20%  RHC 10/19: RA 10, RV 47/9/13, Wedge 29, PA 41/26/33, CO/CI 4.4/2.3, PA sat 57.3  EKG: sinus tachycardia, septal q-waves, left axis deviation   TTE 10/16/23: LV 5.5 cm, LVEF 20% with regional WMAs worse in the apex, LVOT VTI 8 cm, normal RV size/function, severe functional MR, small pericardial effusion, estimated RA pressure 8 mmHg   TTE 12/2022: normal LVEF   LHC: 95% discrete proximal LAD disease and sequential multifocal disease with good distal target, 80-90% proximal LCx disease just prior to marginals, severe multifocal RCA disease with good targets   A1c 8.4    CD 10/17 neg   NIHSS 1  CTH with age indeterminate R cerebellar infarct.  likely chronic   MRi brain with eovlving acute/subacute R PICA infarct with mild edema and mild hemorrhagic transformation. 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  occluded R VA.   CTH stable     Impression:   R cerebellar/PICA infarct as well as L parietal embolic infarct, likely cardioembolic      - CTS eval for CABG given 3 vessel disease vs high risk PCI --> not a CABG candidate   -  in CCU for milrinone pressure support   - c/w asa and statin therpay for secondary stroke prevention.   - no objection to heparin drip for low EF and LV thrombus   - called for risk stratification for heart transplant eval,  low-mod risk and no objection   - b12, TSH, RPR for reversible causes of dementia   - telemetry  - PT/OT   - check FS, glucose control <180  - GI/DVT ppx   - Thank you for allowing me to participate in the care of this patient. Call with questions.   spoke with primary team     Abelardo Irving MD  Vascular Neurology  Office: [phone]

## 2023-11-02 LAB
ALBUMIN SERPL ELPH-MCNC: 3.3 G/DL — SIGNIFICANT CHANGE UP (ref 3.3–5)
ALBUMIN SERPL ELPH-MCNC: 3.3 G/DL — SIGNIFICANT CHANGE UP (ref 3.3–5)
ALP SERPL-CCNC: 115 U/L — SIGNIFICANT CHANGE UP (ref 40–120)
ALP SERPL-CCNC: 115 U/L — SIGNIFICANT CHANGE UP (ref 40–120)
ALT FLD-CCNC: 42 U/L — SIGNIFICANT CHANGE UP (ref 10–45)
ALT FLD-CCNC: 42 U/L — SIGNIFICANT CHANGE UP (ref 10–45)
ANION GAP SERPL CALC-SCNC: 13 MMOL/L — SIGNIFICANT CHANGE UP (ref 5–17)
ANION GAP SERPL CALC-SCNC: 13 MMOL/L — SIGNIFICANT CHANGE UP (ref 5–17)
APTT BLD: 60.7 SEC — HIGH (ref 24.5–35.6)
APTT BLD: 60.7 SEC — HIGH (ref 24.5–35.6)
AST SERPL-CCNC: 21 U/L — SIGNIFICANT CHANGE UP (ref 10–40)
AST SERPL-CCNC: 21 U/L — SIGNIFICANT CHANGE UP (ref 10–40)
BASE EXCESS BLDMV CALC-SCNC: -1.8 MMOL/L — SIGNIFICANT CHANGE UP (ref -3–3)
BASE EXCESS BLDMV CALC-SCNC: -1.8 MMOL/L — SIGNIFICANT CHANGE UP (ref -3–3)
BILIRUB SERPL-MCNC: 0.3 MG/DL — SIGNIFICANT CHANGE UP (ref 0.2–1.2)
BILIRUB SERPL-MCNC: 0.3 MG/DL — SIGNIFICANT CHANGE UP (ref 0.2–1.2)
BUN SERPL-MCNC: 48 MG/DL — HIGH (ref 7–23)
BUN SERPL-MCNC: 48 MG/DL — HIGH (ref 7–23)
CALCIUM SERPL-MCNC: 9 MG/DL — SIGNIFICANT CHANGE UP (ref 8.4–10.5)
CALCIUM SERPL-MCNC: 9 MG/DL — SIGNIFICANT CHANGE UP (ref 8.4–10.5)
CHLORIDE SERPL-SCNC: 100 MMOL/L — SIGNIFICANT CHANGE UP (ref 96–108)
CHLORIDE SERPL-SCNC: 100 MMOL/L — SIGNIFICANT CHANGE UP (ref 96–108)
CO2 BLDMV-SCNC: 25 MMOL/L — SIGNIFICANT CHANGE UP (ref 21–29)
CO2 BLDMV-SCNC: 25 MMOL/L — SIGNIFICANT CHANGE UP (ref 21–29)
CO2 SERPL-SCNC: 20 MMOL/L — LOW (ref 22–31)
CO2 SERPL-SCNC: 20 MMOL/L — LOW (ref 22–31)
CREAT SERPL-MCNC: 2.26 MG/DL — HIGH (ref 0.5–1.3)
CREAT SERPL-MCNC: 2.26 MG/DL — HIGH (ref 0.5–1.3)
DIGOXIN SERPL-MCNC: 2 NG/ML — SIGNIFICANT CHANGE UP (ref 0.8–2)
DIGOXIN SERPL-MCNC: 2 NG/ML — SIGNIFICANT CHANGE UP (ref 0.8–2)
EGFR: 33 ML/MIN/1.73M2 — LOW
EGFR: 33 ML/MIN/1.73M2 — LOW
GAS PNL BLDA: SIGNIFICANT CHANGE UP
GAS PNL BLDA: SIGNIFICANT CHANGE UP
GAS PNL BLDMV: SIGNIFICANT CHANGE UP
GAS PNL BLDMV: SIGNIFICANT CHANGE UP
GLUCOSE BLDC GLUCOMTR-MCNC: 189 MG/DL — HIGH (ref 70–99)
GLUCOSE BLDC GLUCOMTR-MCNC: 189 MG/DL — HIGH (ref 70–99)
GLUCOSE BLDC GLUCOMTR-MCNC: 198 MG/DL — HIGH (ref 70–99)
GLUCOSE BLDC GLUCOMTR-MCNC: 198 MG/DL — HIGH (ref 70–99)
GLUCOSE BLDC GLUCOMTR-MCNC: 223 MG/DL — HIGH (ref 70–99)
GLUCOSE BLDC GLUCOMTR-MCNC: 223 MG/DL — HIGH (ref 70–99)
GLUCOSE SERPL-MCNC: 224 MG/DL — HIGH (ref 70–99)
GLUCOSE SERPL-MCNC: 224 MG/DL — HIGH (ref 70–99)
HCO3 BLDMV-SCNC: 24 MMOL/L — SIGNIFICANT CHANGE UP (ref 20–28)
HCO3 BLDMV-SCNC: 24 MMOL/L — SIGNIFICANT CHANGE UP (ref 20–28)
HCT VFR BLD CALC: 32.8 % — LOW (ref 39–50)
HCT VFR BLD CALC: 32.8 % — LOW (ref 39–50)
HGB BLD-MCNC: 10.3 G/DL — LOW (ref 13–17)
HGB BLD-MCNC: 10.3 G/DL — LOW (ref 13–17)
HOROWITZ INDEX BLDMV+IHG-RTO: 32 — SIGNIFICANT CHANGE UP
HOROWITZ INDEX BLDMV+IHG-RTO: 32 — SIGNIFICANT CHANGE UP
INR BLD: 1.17 RATIO — SIGNIFICANT CHANGE UP (ref 0.85–1.18)
INR BLD: 1.17 RATIO — SIGNIFICANT CHANGE UP (ref 0.85–1.18)
MAGNESIUM SERPL-MCNC: 2.3 MG/DL — SIGNIFICANT CHANGE UP (ref 1.6–2.6)
MAGNESIUM SERPL-MCNC: 2.3 MG/DL — SIGNIFICANT CHANGE UP (ref 1.6–2.6)
MCHC RBC-ENTMCNC: 25.4 PG — LOW (ref 27–34)
MCHC RBC-ENTMCNC: 25.4 PG — LOW (ref 27–34)
MCHC RBC-ENTMCNC: 31.4 GM/DL — LOW (ref 32–36)
MCHC RBC-ENTMCNC: 31.4 GM/DL — LOW (ref 32–36)
MCV RBC AUTO: 80.8 FL — SIGNIFICANT CHANGE UP (ref 80–100)
MCV RBC AUTO: 80.8 FL — SIGNIFICANT CHANGE UP (ref 80–100)
NRBC # BLD: 0 /100 WBCS — SIGNIFICANT CHANGE UP (ref 0–0)
NRBC # BLD: 0 /100 WBCS — SIGNIFICANT CHANGE UP (ref 0–0)
NT-PROBNP SERPL-SCNC: HIGH PG/ML (ref 0–300)
NT-PROBNP SERPL-SCNC: HIGH PG/ML (ref 0–300)
O2 CT VFR BLD CALC: 39 MMHG — SIGNIFICANT CHANGE UP (ref 30–65)
O2 CT VFR BLD CALC: 39 MMHG — SIGNIFICANT CHANGE UP (ref 30–65)
PCO2 BLDMV: 42 MMHG — SIGNIFICANT CHANGE UP (ref 30–65)
PCO2 BLDMV: 42 MMHG — SIGNIFICANT CHANGE UP (ref 30–65)
PH BLDMV: 7.36 — SIGNIFICANT CHANGE UP (ref 7.32–7.45)
PH BLDMV: 7.36 — SIGNIFICANT CHANGE UP (ref 7.32–7.45)
PHOSPHATE SERPL-MCNC: 5.6 MG/DL — HIGH (ref 2.5–4.5)
PHOSPHATE SERPL-MCNC: 5.6 MG/DL — HIGH (ref 2.5–4.5)
PLATELET # BLD AUTO: 194 K/UL — SIGNIFICANT CHANGE UP (ref 150–400)
PLATELET # BLD AUTO: 194 K/UL — SIGNIFICANT CHANGE UP (ref 150–400)
POTASSIUM SERPL-MCNC: 5.1 MMOL/L — SIGNIFICANT CHANGE UP (ref 3.5–5.3)
POTASSIUM SERPL-MCNC: 5.1 MMOL/L — SIGNIFICANT CHANGE UP (ref 3.5–5.3)
POTASSIUM SERPL-SCNC: 5.1 MMOL/L — SIGNIFICANT CHANGE UP (ref 3.5–5.3)
POTASSIUM SERPL-SCNC: 5.1 MMOL/L — SIGNIFICANT CHANGE UP (ref 3.5–5.3)
PROT SERPL-MCNC: 6.2 G/DL — SIGNIFICANT CHANGE UP (ref 6–8.3)
PROT SERPL-MCNC: 6.2 G/DL — SIGNIFICANT CHANGE UP (ref 6–8.3)
PROTHROM AB SERPL-ACNC: 12.8 SEC — SIGNIFICANT CHANGE UP (ref 9.5–13)
PROTHROM AB SERPL-ACNC: 12.8 SEC — SIGNIFICANT CHANGE UP (ref 9.5–13)
RBC # BLD: 4.06 M/UL — LOW (ref 4.2–5.8)
RBC # BLD: 4.06 M/UL — LOW (ref 4.2–5.8)
RBC # FLD: 15.5 % — HIGH (ref 10.3–14.5)
RBC # FLD: 15.5 % — HIGH (ref 10.3–14.5)
SAO2 % BLDMV: 64.2 — SIGNIFICANT CHANGE UP (ref 60–90)
SAO2 % BLDMV: 64.2 — SIGNIFICANT CHANGE UP (ref 60–90)
SODIUM SERPL-SCNC: 133 MMOL/L — LOW (ref 135–145)
SODIUM SERPL-SCNC: 133 MMOL/L — LOW (ref 135–145)
WBC # BLD: 10.78 K/UL — HIGH (ref 3.8–10.5)
WBC # BLD: 10.78 K/UL — HIGH (ref 3.8–10.5)
WBC # FLD AUTO: 10.78 K/UL — HIGH (ref 3.8–10.5)
WBC # FLD AUTO: 10.78 K/UL — HIGH (ref 3.8–10.5)

## 2023-11-02 PROCEDURE — 90832 PSYTX W PT 30 MINUTES: CPT

## 2023-11-02 PROCEDURE — 71045 X-RAY EXAM CHEST 1 VIEW: CPT | Mod: 26

## 2023-11-02 PROCEDURE — 99292 CRITICAL CARE ADDL 30 MIN: CPT

## 2023-11-02 PROCEDURE — 93010 ELECTROCARDIOGRAM REPORT: CPT

## 2023-11-02 PROCEDURE — 99291 CRITICAL CARE FIRST HOUR: CPT

## 2023-11-02 PROCEDURE — 99291 CRITICAL CARE FIRST HOUR: CPT | Mod: GC

## 2023-11-02 RX ORDER — INSULIN LISPRO 100/ML
12 VIAL (ML) SUBCUTANEOUS
Refills: 0 | Status: DISCONTINUED | OUTPATIENT
Start: 2023-11-02 | End: 2023-11-08

## 2023-11-02 RX ORDER — METOPROLOL TARTRATE 50 MG
12.5 TABLET ORAL
Refills: 0 | Status: DISCONTINUED | OUTPATIENT
Start: 2023-11-03 | End: 2023-11-03

## 2023-11-02 RX ORDER — BUMETANIDE 0.25 MG/ML
1 INJECTION INTRAMUSCULAR; INTRAVENOUS ONCE
Refills: 0 | Status: COMPLETED | OUTPATIENT
Start: 2023-11-02 | End: 2023-11-02

## 2023-11-02 RX ORDER — HYDRALAZINE HCL 50 MG
25 TABLET ORAL ONCE
Refills: 0 | Status: COMPLETED | OUTPATIENT
Start: 2023-11-02 | End: 2023-11-02

## 2023-11-02 RX ORDER — HYDRALAZINE HCL 50 MG
50 TABLET ORAL EVERY 8 HOURS
Refills: 0 | Status: DISCONTINUED | OUTPATIENT
Start: 2023-11-02 | End: 2023-11-03

## 2023-11-02 RX ORDER — INSULIN GLARGINE 100 [IU]/ML
28 INJECTION, SOLUTION SUBCUTANEOUS AT BEDTIME
Refills: 0 | Status: DISCONTINUED | OUTPATIENT
Start: 2023-11-02 | End: 2023-11-03

## 2023-11-02 RX ORDER — HYDRALAZINE HCL 50 MG
50 TABLET ORAL EVERY 8 HOURS
Refills: 0 | Status: DISCONTINUED | OUTPATIENT
Start: 2023-11-02 | End: 2023-11-02

## 2023-11-02 RX ORDER — INSULIN LISPRO 100/ML
12 VIAL (ML) SUBCUTANEOUS
Refills: 0 | Status: DISCONTINUED | OUTPATIENT
Start: 2023-11-02 | End: 2023-11-02

## 2023-11-02 RX ADMIN — Medication 81 MILLIGRAM(S): at 12:16

## 2023-11-02 RX ADMIN — Medication 12 UNIT(S): at 09:15

## 2023-11-02 RX ADMIN — INSULIN GLARGINE 28 UNIT(S): 100 INJECTION, SOLUTION SUBCUTANEOUS at 21:13

## 2023-11-02 RX ADMIN — CHLORHEXIDINE GLUCONATE 1 APPLICATION(S): 213 SOLUTION TOPICAL at 21:14

## 2023-11-02 RX ADMIN — ATORVASTATIN CALCIUM 80 MILLIGRAM(S): 80 TABLET, FILM COATED ORAL at 21:13

## 2023-11-02 RX ADMIN — ISOSORBIDE DINITRATE 20 MILLIGRAM(S): 5 TABLET ORAL at 12:16

## 2023-11-02 RX ADMIN — HEPARIN SODIUM 12 UNIT(S)/HR: 5000 INJECTION INTRAVENOUS; SUBCUTANEOUS at 05:43

## 2023-11-02 RX ADMIN — Medication 50 MILLIGRAM(S): at 15:39

## 2023-11-02 RX ADMIN — PANTOPRAZOLE SODIUM 40 MILLIGRAM(S): 20 TABLET, DELAYED RELEASE ORAL at 05:44

## 2023-11-02 RX ADMIN — Medication 125 MICROGRAM(S): at 05:44

## 2023-11-02 RX ADMIN — Medication 2: at 15:34

## 2023-11-02 RX ADMIN — BUMETANIDE 2 MILLIGRAM(S): 0.25 INJECTION INTRAMUSCULAR; INTRAVENOUS at 05:44

## 2023-11-02 RX ADMIN — ISOSORBIDE DINITRATE 20 MILLIGRAM(S): 5 TABLET ORAL at 15:39

## 2023-11-02 RX ADMIN — Medication 50 MILLIGRAM(S): at 21:13

## 2023-11-02 RX ADMIN — Medication 35 MILLIGRAM(S): at 05:43

## 2023-11-02 RX ADMIN — Medication 25 MILLIGRAM(S): at 12:17

## 2023-11-02 RX ADMIN — BUMETANIDE 1 MILLIGRAM(S): 0.25 INJECTION INTRAMUSCULAR; INTRAVENOUS at 12:16

## 2023-11-02 RX ADMIN — Medication 1: at 09:16

## 2023-11-02 RX ADMIN — TAMSULOSIN HYDROCHLORIDE 0.4 MILLIGRAM(S): 0.4 CAPSULE ORAL at 21:13

## 2023-11-02 RX ADMIN — Medication 12 UNIT(S): at 15:34

## 2023-11-02 RX ADMIN — ISOSORBIDE DINITRATE 20 MILLIGRAM(S): 5 TABLET ORAL at 05:44

## 2023-11-02 NOTE — CHART NOTE - NSCHARTNOTEFT_GEN_A_CORE
CICU DOWN GRADE NOTE      Patient is a 58y old  Male who presents with a chief complaint of NSTEMI (02 Nov 2023 10:15)      HPI: Patient with separate chart from [facility], MRN# [id number]    59 yo Male pmhx CVA with mild left sided deficits, HTN, DM2, vertigo, HLD, Mobitz II s/pp Medtronic dual chamber ICD (12/21/22) initially presented to [facility] from home with complaints of dyspnea and chest pain and was admitted w/ acute hypoxic respiratory failure likely due to acute pulmonary edema due to acute HFrEF in setting of acute NSTEMI, LAURA and enterovirus infection. Pt with brief MICU stay at [facility] requiring bipap (no intubation), was treated for PNA with cefepime, and was found to have TTE done 9/26/23 showing EF 20% with severely decreased LVSF, multiple regional wall motion abnormalities, and elevated LV end diastolic pressure. Pt was transferred to [facility] for cardiac cath evaluation. Patient without any acute complaints, complaining of intermittent palpitations for the last 2 days. No chest pain, SOB, fevers, nausea, vomiting, abdominal pain.  (13 Oct 2023 21:05)    Underwent LHC on 10/16 with 3v disease (95% pLAD, 80-90% pLCX, severe RCA) for which CTS was consulted for CABG eval. cMRI revealed limited LAD viability. RHC on 10/19 for hemodynamic assessment showed RA 10, PCWP 20, CO 4.4, CI 2.3. On 10/20, noted to be cold, clammy, altered after receiving toprol c/f borderline cardiogenic shock, admitted to CICU on 10/20 for CABG vs. Advanced therapy vs. high risk PCI evaluation. Deemed to be too high risk for CABG and was stable with hydralazine for afterload reduction and diuresis with bumex. Heart Failure team following. Pending discussion regarding high risk PCI vs further intervention; EP recommended no indication for upgrade at this time. No plan for VAD/transplant eval at this time.    On limited TTE 10/26, EF <20 %, LV apical thrombus identified. Per chart review, pt disoriented and unable to provide year, location, month. Pt w/ persistent tachycardia, renal dysfunction, hypotension and today, elevated lactate. Transferred to CCU for inotropic support and hemodynamic monitoring.      INTERVAL HPI/OVERNIGHT EVENTS: Started on milrinone on 10/28 and weaned off 11/1. Pt with stable numbers after optimizing afterload reduction with isordil and hydral, lactate downtrended. Pt receiving intermittent bumex on top of standing PO with good UO although not always reliable given incontinence counts. Pt's tachycardia deemed to be sinus likely iso cardiogenic shock. Deemed not to be a CABG candidate by CTS due to limited viability in LAD territory. Plan for AT eval vs. high risk PCI, although may want pt's mental status optimized before intervention.    Pt with mental status A&Ox2-3 while in the CCU (knows name, that he is in hospital, sometimes knows month, does not know year, knows president). He had MR head 10/30 showing R PICA infarct and L parietal infarct, likely due to cardioembolic shower, per neuro. Very mild hemorrhagic transformation in R PICA distribution, repeat CT head confirmed no hemorrhage. Neuro recommended to c/w heparin gtt and repeat imaging if change in neuro exam (currently normal except for A&Ox2-3 and slow finger to nose). Pt w/ LAURA since hospitalized and variable elevated daily creatinine.        REVIEW OF SYSTEMS:  CONSTITUTIONAL: No fever, chills  HEENT:  No blurry vision No sinus or throat pain  NECK: No pain or stiffness  RESPIRATORY: No cough, wheezing, chills or hemoptysis; No shortness of breath  CARDIOVASCULAR: No chest pain, palpitations  GASTROINTESTINAL: No abdominal pain. No nausea, vomiting, or diarrhea  GENITOURINARY: No dysuria  NEUROLOGICAL: No HA, No focal weakness  SKIN: No itching, burning, rashes, or lesions   MUSCULOSKELETAL: No joint pain or swelling; No muscle, back, or extremity pain    MEDICATIONS:  aspirin enteric coated 81 milliGRAM(s) Oral daily  atorvastatin 80 milliGRAM(s) Oral at bedtime  benzocaine/menthol Lozenge 1 Lozenge Oral every 2 hours PRN  buMETAnide 2 milliGRAM(s) Oral daily  chlorhexidine 2% Cloths 1 Application(s) Topical daily  digoxin     Tablet 125 MICROGram(s) Oral daily  heparin  Infusion 1200 Unit(s)/Hr IV Continuous <Continuous>  hydrALAZINE 50 milliGRAM(s) Oral every 8 hours  insulin glargine Injectable (LANTUS) 28 Unit(s) SubCutaneous at bedtime  insulin lispro (ADMELOG) corrective regimen sliding scale   SubCutaneous three times a day before meals  insulin lispro (ADMELOG) corrective regimen sliding scale   SubCutaneous at bedtime  insulin lispro Injectable (ADMELOG) 12 Unit(s) SubCutaneous three times a day before meals  isosorbide   dinitrate Tablet (ISORDIL) 20 milliGRAM(s) Oral three times a day  pantoprazole    Tablet 40 milliGRAM(s) Oral before breakfast  polyethylene glycol 3350 17 Gram(s) Oral daily PRN  senna 2 Tablet(s) Oral at bedtime  tamsulosin 0.4 milliGRAM(s) Oral at bedtime      T(C): 37 (11-02-23 @ 03:00), Max: 37.4 (11-01-23 @ 19:00)  HR: 108 (11-02-23 @ 10:00) (103 - 118)  BP: --  RR: 24 (11-02-23 @ 10:00) (19 - 49)  SpO2: 99% (11-02-23 @ 10:00) (92% - 99%)  Wt(kg): --Vital Signs Last 24 Hrs  T(C): 37 (02 Nov 2023 03:00), Max: 37.4 (01 Nov 2023 19:00)  T(F): 98.6 (02 Nov 2023 03:00), Max: 99.3 (01 Nov 2023 19:00)  HR: 108 (02 Nov 2023 10:00) (103 - 118)  BP: --  BP(mean): --  RR: 24 (02 Nov 2023 10:00) (19 - 49)  SpO2: 99% (02 Nov 2023 10:00) (92% - 99%)    Parameters below as of 02 Nov 2023 10:00  Patient On (Oxygen Delivery Method): nasal cannula  O2 Flow (L/min): 3      PHYSICAL EXAM:  GENERAL: AAOx2-3 (name, in hospital, month). NAD  HEAD:  Atraumatic, Normocephalic  EYES: PERRLA, conjunctiva and sclera clear  NECK: Supple, No JVD, Normal thyroid, no enlarged nodes  NERVOUS SYSTEM: Strength 5/5 throughout (unable to assess RLE strength due to swan), sensation present throughout, CN intact, pt w/ difficulty expressing words almost like Broca's aphasia. Slow finger to nose.  CHEST/LUNG: coarse breath sounds on expiration LLL; No rales, rhonchi, or wheezing  HEART: S1S2 normal, Regular rate and rhythm; No murmurs appreciated  ABDOMEN: Soft, Nontender, Nondistended; Bowel sounds present  EXTREMITIES:  2+ Peripheral Pulses, No clubbing, cyanosis, or edema  LYMPH: No lymphadenopathy noted  SKIN: No rashes or lesions    Consultant(s) Notes Reviewed:  [x ] YES  [ ] NO  Care Discussed with Consultants/Other Providers [ x] YES  [ ] NO    LABS:                        10.3   10.78 )-----------( 194      ( 02 Nov 2023 00:55 )             32.8     11-02    133<L>  |  100  |  48<H>  ----------------------------<  224<H>  5.1   |  20<L>  |  2.26<H>    Ca    9.0      02 Nov 2023 00:55  Phos  5.6     11-02  Mg     2.3     11-02    TPro  6.2  /  Alb  3.3  /  TBili  0.3  /  DBili  x   /  AST  21  /  ALT  42  /  AlkPhos  115  11-02    PT/INR - ( 02 Nov 2023 00:55 )   PT: 12.8 sec;   INR: 1.17 ratio         PTT - ( 02 Nov 2023 00:55 )  PTT:60.7 sec  Urinalysis Basic - ( 02 Nov 2023 00:55 )    Color: x / Appearance: x / SG: x / pH: x  Gluc: 224 mg/dL / Ketone: x  / Bili: x / Urobili: x   Blood: x / Protein: x / Nitrite: x   Leuk Esterase: x / RBC: x / WBC x   Sq Epi: x / Non Sq Epi: x / Bacteria: x      CAPILLARY BLOOD GLUCOSE      POCT Blood Glucose.: 198 mg/dL (02 Nov 2023 08:45)  POCT Blood Glucose.: 272 mg/dL (01 Nov 2023 20:53)  POCT Blood Glucose.: 311 mg/dL (01 Nov 2023 18:38)      ABG - ( 02 Nov 2023 00:53 )  pH, Arterial: 7.37  pH, Blood: x     /  pCO2: 37    /  pO2: 102   / HCO3: 21    / Base Excess: -3.5  /  SaO2: 97.9              Urinalysis Basic - ( 02 Nov 2023 00:55 )    Color: x / Appearance: x / SG: x / pH: x  Gluc: 224 mg/dL / Ketone: x  / Bili: x / Urobili: x   Blood: x / Protein: x / Nitrite: x   Leuk Esterase: x / RBC: x / WBC x   Sq Epi: x / Non Sq Epi: x / Bacteria: x        RADIOLOGY & ADDITIONAL TESTS:    Imaging Personally Reviewed:  [x ] YES  [ ] NO    To Do's:    [ ] HF recs, AT eval  [ ] monitor Cr closely, LAURA  [ ] c/w bumex 2 mg PO, additional diuresis per HF recs  [ ] start metoprolol 12.5 qd 11/3 CICU DOWN GRADE NOTE    Accepting Physician: Dr. Atwood      Patient is a 58y old  Male who presents with a chief complaint of NSTEMI (02 Nov 2023 10:15)      HPI: Patient with separate chart from [facility], MRN# [id number]    59 yo Male pmhx CVA with mild left sided deficits, HTN, DM2, vertigo, HLD, Mobitz II s/pp Medtronic dual chamber ICD (12/21/22) initially presented to [facility] from home with complaints of dyspnea and chest pain and was admitted w/ acute hypoxic respiratory failure likely due to acute pulmonary edema due to acute HFrEF in setting of acute NSTEMI, LAURA and enterovirus infection. Pt with brief MICU stay at [facility] requiring bipap (no intubation), was treated for PNA with cefepime, and was found to have TTE done 9/26/23 showing EF 20% with severely decreased LVSF, multiple regional wall motion abnormalities, and elevated LV end diastolic pressure. Pt was transferred to [facility] for cardiac cath evaluation. Patient without any acute complaints, complaining of intermittent palpitations for the last 2 days. No chest pain, SOB, fevers, nausea, vomiting, abdominal pain.  (13 Oct 2023 21:05)    Underwent LHC on 10/16 with 3v disease (95% pLAD, 80-90% pLCX, severe RCA) for which CTS was consulted for CABG eval. cMRI revealed limited LAD viability. RHC on 10/19 for hemodynamic assessment showed RA 10, PCWP 20, CO 4.4, CI 2.3. On 10/20, noted to be cold, clammy, altered after receiving toprol c/f borderline cardiogenic shock, admitted to CICU on 10/20 for CABG vs. Advanced therapy vs. high risk PCI evaluation. Deemed to be too high risk for CABG and was stable with hydralazine for afterload reduction and diuresis with bumex. Heart Failure team following. Pending discussion regarding high risk PCI vs further intervention; EP recommended no indication for upgrade at this time. No plan for VAD/transplant eval at this time.    On limited TTE 10/26, EF <20 %, LV apical thrombus identified. Per chart review, pt disoriented and unable to provide year, location, month. Pt w/ persistent tachycardia, renal dysfunction, hypotension and today, elevated lactate. Transferred to CCU for inotropic support and hemodynamic monitoring.      INTERVAL HPI/OVERNIGHT EVENTS: Started on milrinone on 10/28 and weaned off 11/1. Pt with stable numbers after optimizing afterload reduction with isordil and hydral, lactate downtrended. Pt receiving intermittent bumex on top of standing PO with good UO although not always reliable given incontinence counts. Pt's tachycardia deemed to be sinus likely iso cardiogenic shock. Deemed not to be a CABG candidate by CTS due to limited viability in LAD territory. Plan for AT eval vs. high risk PCI, although may want pt's mental status optimized before intervention.    Pt with mental status A&Ox2-3 while in the CCU (knows name, that he is in hospital, sometimes knows month, does not know year, knows president). He had MR head 10/30 showing R PICA infarct and L parietal infarct, likely due to cardioembolic shower, per neuro. Very mild hemorrhagic transformation in R PICA distribution, repeat CT head confirmed no hemorrhage. Neuro recommended to c/w heparin gtt and repeat imaging if change in neuro exam (currently normal except for A&Ox2-3 and slow finger to nose). Pt w/ LAURA since hospitalized and variable elevated daily creatinine.        REVIEW OF SYSTEMS:  CONSTITUTIONAL: No fever, chills  HEENT:  No blurry vision No sinus or throat pain  NECK: No pain or stiffness  RESPIRATORY: No cough, wheezing, chills or hemoptysis; No shortness of breath  CARDIOVASCULAR: No chest pain, palpitations  GASTROINTESTINAL: No abdominal pain. No nausea, vomiting, or diarrhea  GENITOURINARY: No dysuria  NEUROLOGICAL: No HA, No focal weakness  SKIN: No itching, burning, rashes, or lesions   MUSCULOSKELETAL: No joint pain or swelling; No muscle, back, or extremity pain    MEDICATIONS:  aspirin enteric coated 81 milliGRAM(s) Oral daily  atorvastatin 80 milliGRAM(s) Oral at bedtime  benzocaine/menthol Lozenge 1 Lozenge Oral every 2 hours PRN  buMETAnide 2 milliGRAM(s) Oral daily  chlorhexidine 2% Cloths 1 Application(s) Topical daily  digoxin     Tablet 125 MICROGram(s) Oral daily  heparin  Infusion 1200 Unit(s)/Hr IV Continuous <Continuous>  hydrALAZINE 50 milliGRAM(s) Oral every 8 hours  insulin glargine Injectable (LANTUS) 28 Unit(s) SubCutaneous at bedtime  insulin lispro (ADMELOG) corrective regimen sliding scale   SubCutaneous three times a day before meals  insulin lispro (ADMELOG) corrective regimen sliding scale   SubCutaneous at bedtime  insulin lispro Injectable (ADMELOG) 12 Unit(s) SubCutaneous three times a day before meals  isosorbide   dinitrate Tablet (ISORDIL) 20 milliGRAM(s) Oral three times a day  pantoprazole    Tablet 40 milliGRAM(s) Oral before breakfast  polyethylene glycol 3350 17 Gram(s) Oral daily PRN  senna 2 Tablet(s) Oral at bedtime  tamsulosin 0.4 milliGRAM(s) Oral at bedtime      T(C): 37 (11-02-23 @ 03:00), Max: 37.4 (11-01-23 @ 19:00)  HR: 108 (11-02-23 @ 10:00) (103 - 118)  BP: --  RR: 24 (11-02-23 @ 10:00) (19 - 49)  SpO2: 99% (11-02-23 @ 10:00) (92% - 99%)  Wt(kg): --Vital Signs Last 24 Hrs  T(C): 37 (02 Nov 2023 03:00), Max: 37.4 (01 Nov 2023 19:00)  T(F): 98.6 (02 Nov 2023 03:00), Max: 99.3 (01 Nov 2023 19:00)  HR: 108 (02 Nov 2023 10:00) (103 - 118)  BP: --  BP(mean): --  RR: 24 (02 Nov 2023 10:00) (19 - 49)  SpO2: 99% (02 Nov 2023 10:00) (92% - 99%)    Parameters below as of 02 Nov 2023 10:00  Patient On (Oxygen Delivery Method): nasal cannula  O2 Flow (L/min): 3      PHYSICAL EXAM:  GENERAL: AAOx2-3 (name, in hospital, month). NAD  HEAD:  Atraumatic, Normocephalic  EYES: PERRLA, conjunctiva and sclera clear  NECK: Supple, No JVD, Normal thyroid, no enlarged nodes  NERVOUS SYSTEM: Strength 5/5 throughout (unable to assess RLE strength due to swan), sensation present throughout, CN intact, pt w/ difficulty expressing words almost like Broca's aphasia. Slow finger to nose.  CHEST/LUNG: coarse breath sounds on expiration LLL; No rales, rhonchi, or wheezing  HEART: S1S2 normal, Regular rate and rhythm; No murmurs appreciated  ABDOMEN: Soft, Nontender, Nondistended; Bowel sounds present  EXTREMITIES:  2+ Peripheral Pulses, No clubbing, cyanosis, or edema  LYMPH: No lymphadenopathy noted  SKIN: No rashes or lesions    Consultant(s) Notes Reviewed:  [x ] YES  [ ] NO  Care Discussed with Consultants/Other Providers [ x] YES  [ ] NO    LABS:                        10.3   10.78 )-----------( 194      ( 02 Nov 2023 00:55 )             32.8     11-02    133<L>  |  100  |  48<H>  ----------------------------<  224<H>  5.1   |  20<L>  |  2.26<H>    Ca    9.0      02 Nov 2023 00:55  Phos  5.6     11-02  Mg     2.3     11-02    TPro  6.2  /  Alb  3.3  /  TBili  0.3  /  DBili  x   /  AST  21  /  ALT  42  /  AlkPhos  115  11-02    PT/INR - ( 02 Nov 2023 00:55 )   PT: 12.8 sec;   INR: 1.17 ratio         PTT - ( 02 Nov 2023 00:55 )  PTT:60.7 sec  Urinalysis Basic - ( 02 Nov 2023 00:55 )    Color: x / Appearance: x / SG: x / pH: x  Gluc: 224 mg/dL / Ketone: x  / Bili: x / Urobili: x   Blood: x / Protein: x / Nitrite: x   Leuk Esterase: x / RBC: x / WBC x   Sq Epi: x / Non Sq Epi: x / Bacteria: x      CAPILLARY BLOOD GLUCOSE      POCT Blood Glucose.: 198 mg/dL (02 Nov 2023 08:45)  POCT Blood Glucose.: 272 mg/dL (01 Nov 2023 20:53)  POCT Blood Glucose.: 311 mg/dL (01 Nov 2023 18:38)      ABG - ( 02 Nov 2023 00:53 )  pH, Arterial: 7.37  pH, Blood: x     /  pCO2: 37    /  pO2: 102   / HCO3: 21    / Base Excess: -3.5  /  SaO2: 97.9              Urinalysis Basic - ( 02 Nov 2023 00:55 )    Color: x / Appearance: x / SG: x / pH: x  Gluc: 224 mg/dL / Ketone: x  / Bili: x / Urobili: x   Blood: x / Protein: x / Nitrite: x   Leuk Esterase: x / RBC: x / WBC x   Sq Epi: x / Non Sq Epi: x / Bacteria: x        RADIOLOGY & ADDITIONAL TESTS:    Imaging Personally Reviewed:  [x ] YES  [ ] NO    To Do's:    [ ] HF recs, AT eval  [ ] monitor Cr closely, LAURA  [ ] c/w bumex 2 mg PO, additional diuresis per HF recs  [ ] start metoprolol 12.5 qd 11/3 CICU DOWN GRADE NOTE    Accepting Physician: Dr. Atwood      Patient is a 58y old  Male who presents with a chief complaint of NSTEMI (02 Nov 2023 10:15)      HPI: Patient with separate chart from [facility], MRN# [id number]    59 yo Male pmhx CVA with mild left sided deficits, HTN, DM2, vertigo, HLD, Mobitz II s/pp Medtronic dual chamber ICD (12/21/22) initially presented to [facility] from home with complaints of dyspnea and chest pain and was admitted w/ acute hypoxic respiratory failure likely due to acute pulmonary edema due to acute HFrEF in setting of acute NSTEMI, LAURA and enterovirus infection. Pt with brief MICU stay at [facility] requiring bipap (no intubation), was treated for PNA with cefepime, and was found to have TTE done 9/26/23 showing EF 20% with severely decreased LVSF, multiple regional wall motion abnormalities, and elevated LV end diastolic pressure. Pt was transferred to [facility] for cardiac cath evaluation. Patient without any acute complaints, complaining of intermittent palpitations for the last 2 days. No chest pain, SOB, fevers, nausea, vomiting, abdominal pain.  (13 Oct 2023 21:05)    Underwent LHC on 10/16 with 3v disease (95% pLAD, 80-90% pLCX, severe RCA) for which CTS was consulted for CABG eval. cMRI revealed limited LAD viability. RHC on 10/19 for hemodynamic assessment showed RA 10, PCWP 20, CO 4.4, CI 2.3. On 10/20, noted to be cold, clammy, altered after receiving toprol c/f borderline cardiogenic shock, admitted to CICU on 10/20 for CABG vs. Advanced therapy vs. high risk PCI evaluation. Deemed to be too high risk for CABG and was stable with hydralazine for afterload reduction and diuresis with bumex. Heart Failure team following. Pending discussion regarding high risk PCI vs further intervention; EP recommended no indication for upgrade at this time. No plan for VAD/transplant eval at this time.    On limited TTE 10/26, EF <20 %, LV apical thrombus identified. Per chart review, pt disoriented and unable to provide year, location, month. Pt w/ persistent tachycardia, renal dysfunction, hypotension and today, elevated lactate. Transferred to CCU for inotropic support and hemodynamic monitoring.      INTERVAL HPI/OVERNIGHT EVENTS: Started on milrinone on 10/28 and weaned off 11/1. Pt with stable numbers after optimizing afterload reduction with isordil and hydral, lactate downtrended. Pt receiving intermittent bumex on top of standing PO with good UO although not always reliable given incontinence counts. Pt's tachycardia deemed to be sinus likely iso cardiogenic shock. Deemed not to be a CABG candidate by CTS due to limited viability in LAD territory. Plan for AT eval vs. high risk PCI, although may want pt's mental status optimized before intervention.    Pt with mental status A&Ox2-3 while in the CCU (knows name, that he is in hospital, sometimes knows month, does not know year, knows president). He had MR head 10/30 showing R PICA infarct and L parietal infarct, likely due to cardioembolic shower, per neuro. Very mild hemorrhagic transformation in R PICA distribution, repeat CT head confirmed no hemorrhage. Neuro recommended to c/w heparin gtt and repeat imaging if change in neuro exam (currently normal except for A&Ox2-3 and slow finger to nose). Pt w/ LAURA since hospitalized and variable elevated daily creatinine.        REVIEW OF SYSTEMS:  CONSTITUTIONAL: No fever, chills  HEENT:  No blurry vision No sinus or throat pain  NECK: No pain or stiffness  RESPIRATORY: No cough, wheezing, chills or hemoptysis; No shortness of breath  CARDIOVASCULAR: No chest pain, palpitations  GASTROINTESTINAL: No abdominal pain. No nausea, vomiting, or diarrhea  GENITOURINARY: No dysuria  NEUROLOGICAL: No HA, No focal weakness  SKIN: No itching, burning, rashes, or lesions   MUSCULOSKELETAL: No joint pain or swelling; No muscle, back, or extremity pain    MEDICATIONS:  aspirin enteric coated 81 milliGRAM(s) Oral daily  atorvastatin 80 milliGRAM(s) Oral at bedtime  benzocaine/menthol Lozenge 1 Lozenge Oral every 2 hours PRN  buMETAnide 2 milliGRAM(s) Oral daily  chlorhexidine 2% Cloths 1 Application(s) Topical daily  digoxin     Tablet 125 MICROGram(s) Oral daily  heparin  Infusion 1200 Unit(s)/Hr IV Continuous <Continuous>  hydrALAZINE 50 milliGRAM(s) Oral every 8 hours  insulin glargine Injectable (LANTUS) 28 Unit(s) SubCutaneous at bedtime  insulin lispro (ADMELOG) corrective regimen sliding scale   SubCutaneous three times a day before meals  insulin lispro (ADMELOG) corrective regimen sliding scale   SubCutaneous at bedtime  insulin lispro Injectable (ADMELOG) 12 Unit(s) SubCutaneous three times a day before meals  isosorbide   dinitrate Tablet (ISORDIL) 20 milliGRAM(s) Oral three times a day  pantoprazole    Tablet 40 milliGRAM(s) Oral before breakfast  polyethylene glycol 3350 17 Gram(s) Oral daily PRN  senna 2 Tablet(s) Oral at bedtime  tamsulosin 0.4 milliGRAM(s) Oral at bedtime      T(C): 37 (11-02-23 @ 03:00), Max: 37.4 (11-01-23 @ 19:00)  HR: 108 (11-02-23 @ 10:00) (103 - 118)  BP: --  RR: 24 (11-02-23 @ 10:00) (19 - 49)  SpO2: 99% (11-02-23 @ 10:00) (92% - 99%)  Wt(kg): --Vital Signs Last 24 Hrs  T(C): 37 (02 Nov 2023 03:00), Max: 37.4 (01 Nov 2023 19:00)  T(F): 98.6 (02 Nov 2023 03:00), Max: 99.3 (01 Nov 2023 19:00)  HR: 108 (02 Nov 2023 10:00) (103 - 118)  BP: --  BP(mean): --  RR: 24 (02 Nov 2023 10:00) (19 - 49)  SpO2: 99% (02 Nov 2023 10:00) (92% - 99%)    Parameters below as of 02 Nov 2023 10:00  Patient On (Oxygen Delivery Method): nasal cannula  O2 Flow (L/min): 3      PHYSICAL EXAM:  GENERAL: AAOx2-3 (name, in hospital, month). NAD  HEAD:  Atraumatic, Normocephalic  EYES: PERRLA, conjunctiva and sclera clear  NECK: Supple, No JVD, Normal thyroid, no enlarged nodes  NERVOUS SYSTEM: Strength 5/5 throughout (unable to assess RLE strength due to swan), sensation present throughout, CN intact, pt w/ difficulty expressing words almost like Broca's aphasia. Slow finger to nose.  CHEST/LUNG: coarse breath sounds on expiration LLL; No rales, rhonchi, or wheezing  HEART: S1S2 normal, Regular rate and rhythm; No murmurs appreciated  ABDOMEN: Soft, Nontender, Nondistended; Bowel sounds present  EXTREMITIES:  2+ Peripheral Pulses, No clubbing, cyanosis, or edema  LYMPH: No lymphadenopathy noted  SKIN: No rashes or lesions    Consultant(s) Notes Reviewed:  [x ] YES  [ ] NO  Care Discussed with Consultants/Other Providers [ x] YES  [ ] NO    LABS:                        10.3   10.78 )-----------( 194      ( 02 Nov 2023 00:55 )             32.8     11-02    133<L>  |  100  |  48<H>  ----------------------------<  224<H>  5.1   |  20<L>  |  2.26<H>    Ca    9.0      02 Nov 2023 00:55  Phos  5.6     11-02  Mg     2.3     11-02    TPro  6.2  /  Alb  3.3  /  TBili  0.3  /  DBili  x   /  AST  21  /  ALT  42  /  AlkPhos  115  11-02    PT/INR - ( 02 Nov 2023 00:55 )   PT: 12.8 sec;   INR: 1.17 ratio         PTT - ( 02 Nov 2023 00:55 )  PTT:60.7 sec  Urinalysis Basic - ( 02 Nov 2023 00:55 )    Color: x / Appearance: x / SG: x / pH: x  Gluc: 224 mg/dL / Ketone: x  / Bili: x / Urobili: x   Blood: x / Protein: x / Nitrite: x   Leuk Esterase: x / RBC: x / WBC x   Sq Epi: x / Non Sq Epi: x / Bacteria: x      CAPILLARY BLOOD GLUCOSE      POCT Blood Glucose.: 198 mg/dL (02 Nov 2023 08:45)  POCT Blood Glucose.: 272 mg/dL (01 Nov 2023 20:53)  POCT Blood Glucose.: 311 mg/dL (01 Nov 2023 18:38)      ABG - ( 02 Nov 2023 00:53 )  pH, Arterial: 7.37  pH, Blood: x     /  pCO2: 37    /  pO2: 102   / HCO3: 21    / Base Excess: -3.5  /  SaO2: 97.9              Urinalysis Basic - ( 02 Nov 2023 00:55 )    Color: x / Appearance: x / SG: x / pH: x  Gluc: 224 mg/dL / Ketone: x  / Bili: x / Urobili: x   Blood: x / Protein: x / Nitrite: x   Leuk Esterase: x / RBC: x / WBC x   Sq Epi: x / Non Sq Epi: x / Bacteria: x        RADIOLOGY & ADDITIONAL TESTS:    Imaging Personally Reviewed:  [x ] YES  [ ] NO    To Do's:    [ ] f/u INR in AM, if therapeutic INR 2-3, d/c heparin gtt  [ ] HF recs, AT eval  [ ] monitor Cr closely, LAURA  [ ] c/w bumex 2 mg PO, additional diuresis per HF recs  [ ] start metoprolol 12.5 q6 11/4  [ ] f/u chest xray tomorrow CICU DOWN GRADE NOTE    Accepting Physician: Dr. Atwood  ACP: Zoey Bean  Room: 4 [address]      Patient is a 58y old  Male who presents with a chief complaint of NSTEMI (02 Nov 2023 10:15)      HPI: Patient with separate chart from [facility], [place], MRN# [id number]    57 yo Male pmhx CVA with mild left sided deficits, HTN, DM2, vertigo, HLD, Mobitz II s/pp [employer] dual chamber ICD (12/21/22) initially presented to [facility] [place] from home with complaints of dyspnea and chest pain and was admitted w/ acute hypoxic respiratory failure likely due to acute pulmonary edema due to acute HFrEF in setting of acute NSTEMI, LAURA and enterovirus infection. Pt with brief MICU stay at [facility] [place] requiring bipap (no intubation), was treated for PNA with cefepime, and was found to have TTE done 9/26/23 showing EF 20% with severely decreased LVSF, multiple regional wall motion abnormalities, and elevated LV end diastolic pressure. Pt was transferred to [facility] for cardiac cath evaluation. Patient without any acute complaints, complaining of intermittent palpitations for the last 2 days. No chest pain, SOB, fevers, nausea, vomiting, abdominal pain.  (13 Oct 2023 21:05)    Underwent LHC on 10/16 with 3v disease (95% pLAD, 80-90% pLCX, severe RCA) for which CTS was consulted for CABG eval. cMRI revealed limited LAD viability. RHC on 10/19 for hemodynamic assessment showed RA 10, PCWP 20, CO 4.4, CI 2.3. On 10/20, noted to be cold, clammy, altered after receiving toprol c/f borderline cardiogenic shock, admitted to CICU on 10/20 for CABG vs. Advanced therapy vs. high risk PCI evaluation. Deemed to be too high risk for CABG and was stable with hydralazine for afterload reduction and diuresis with bumex. Heart Failure team following. Pending discussion regarding high risk PCI vs further intervention; EP recommended no indication for upgrade at this time. No plan for VAD/transplant eval at this time.    On limited TTE 10/26, EF <20 %, LV apical thrombus identified. Per chart review, pt disoriented and unable to provide year, location, month. Pt w/ persistent tachycardia, renal dysfunction, hypotension and today, elevated lactate. Transferred to CCU for inotropic support and hemodynamic monitoring.      INTERVAL HPI/OVERNIGHT EVENTS: Started on milrinone on 10/28 and weaned off 11/1. Pt with stable numbers after optimizing afterload reduction with isordil and hydral, lactate downtrended. Pt receiving intermittent bumex on top of standing PO with good UO although not always reliable given incontinence counts. Pt's tachycardia deemed to be sinus likely iso cardiogenic shock. Deemed not to be a CABG candidate by CTS due to limited viability in LAD territory. Plan for AT eval vs. high risk PCI, although may want pt's mental status optimized before intervention.    Pt with mental status A&Ox2-3 while in the CCU (knows name, that he is in hospital, sometimes knows month, does not know year, knows president). He had MR head 10/30 showing R PICA infarct and L parietal infarct, likely due to cardioembolic shower, per neuro. Very mild hemorrhagic transformation in R PICA distribution, repeat CT head confirmed no hemorrhage. Neuro recommended to c/w heparin gtt and repeat imaging if change in neuro exam (currently normal except for A&Ox2-3 and slow finger to nose). Pt w/ LAURA since hospitalized and variable elevated daily creatinine.        REVIEW OF SYSTEMS:  CONSTITUTIONAL: No fever, chills  HEENT:  No blurry vision No sinus or throat pain  NECK: No pain or stiffness  RESPIRATORY: No cough, wheezing, chills or hemoptysis; No shortness of breath  CARDIOVASCULAR: No chest pain, palpitations  GASTROINTESTINAL: No abdominal pain. No nausea, vomiting, or diarrhea  GENITOURINARY: No dysuria  NEUROLOGICAL: No HA, No focal weakness  SKIN: No itching, burning, rashes, or lesions   MUSCULOSKELETAL: No joint pain or swelling; No muscle, back, or extremity pain    MEDICATIONS:  aspirin enteric coated 81 milliGRAM(s) Oral daily  atorvastatin 80 milliGRAM(s) Oral at bedtime  benzocaine/menthol Lozenge 1 Lozenge Oral every 2 hours PRN  buMETAnide 2 milliGRAM(s) Oral daily  chlorhexidine 2% Cloths 1 Application(s) Topical daily  digoxin     Tablet 125 MICROGram(s) Oral daily  heparin  Infusion 1200 Unit(s)/Hr IV Continuous <Continuous>  hydrALAZINE 50 milliGRAM(s) Oral every 8 hours  insulin glargine Injectable (LANTUS) 28 Unit(s) SubCutaneous at bedtime  insulin lispro (ADMELOG) corrective regimen sliding scale   SubCutaneous three times a day before meals  insulin lispro (ADMELOG) corrective regimen sliding scale   SubCutaneous at bedtime  insulin lispro Injectable (ADMELOG) 12 Unit(s) SubCutaneous three times a day before meals  isosorbide   dinitrate Tablet (ISORDIL) 20 milliGRAM(s) Oral three times a day  pantoprazole    Tablet 40 milliGRAM(s) Oral before breakfast  polyethylene glycol 3350 17 Gram(s) Oral daily PRN  senna 2 Tablet(s) Oral at bedtime  tamsulosin 0.4 milliGRAM(s) Oral at bedtime      T(C): 37 (11-02-23 @ 03:00), Max: 37.4 (11-01-23 @ 19:00)  HR: 108 (11-02-23 @ 10:00) (103 - 118)  BP: --  RR: 24 (11-02-23 @ 10:00) (19 - 49)  SpO2: 99% (11-02-23 @ 10:00) (92% - 99%)  Wt(kg): --Vital Signs Last 24 Hrs  T(C): 37 (02 Nov 2023 03:00), Max: 37.4 (01 Nov 2023 19:00)  T(F): 98.6 (02 Nov 2023 03:00), Max: 99.3 (01 Nov 2023 19:00)  HR: 108 (02 Nov 2023 10:00) (103 - 118)  BP: --  BP(mean): --  RR: 24 (02 Nov 2023 10:00) (19 - 49)  SpO2: 99% (02 Nov 2023 10:00) (92% - 99%)    Parameters below as of 02 Nov 2023 10:00  Patient On (Oxygen Delivery Method): nasal cannula  O2 Flow (L/min): 3      PHYSICAL EXAM:  GENERAL: AAOx2-3 (name, in hospital, month). NAD  HEAD:  Atraumatic, Normocephalic  EYES: PERRLA, conjunctiva and sclera clear  NECK: Supple, No JVD, Normal thyroid, no enlarged nodes  NERVOUS SYSTEM: Strength 5/5 throughout (unable to assess RLE strength due to swan), sensation present throughout, CN intact, pt w/ difficulty expressing words almost like Broca's aphasia. Slow finger to nose.  CHEST/LUNG: coarse breath sounds on expiration LLL; No rales, rhonchi, or wheezing  HEART: S1S2 normal, Regular rate and rhythm; No murmurs appreciated  ABDOMEN: Soft, Nontender, Nondistended; Bowel sounds present  EXTREMITIES:  2+ Peripheral Pulses, No clubbing, cyanosis, or edema  LYMPH: No lymphadenopathy noted  SKIN: No rashes or lesions    Consultant(s) Notes Reviewed:  [x ] YES  [ ] NO  Care Discussed with Consultants/Other Providers [ x] YES  [ ] NO    LABS:                        10.3   10.78 )-----------( 194      ( 02 Nov 2023 00:55 )             32.8     11-02    133<L>  |  100  |  48<H>  ----------------------------<  224<H>  5.1   |  20<L>  |  2.26<H>    Ca    9.0      02 Nov 2023 00:55  Phos  5.6     11-02  Mg     2.3     11-02    TPro  6.2  /  Alb  3.3  /  TBili  0.3  /  DBili  x   /  AST  21  /  ALT  42  /  AlkPhos  115  11-02    PT/INR - ( 02 Nov 2023 00:55 )   PT: 12.8 sec;   INR: 1.17 ratio         PTT - ( 02 Nov 2023 00:55 )  PTT:60.7 sec  Urinalysis Basic - ( 02 Nov 2023 00:55 )    Color: x / Appearance: x / SG: x / pH: x  Gluc: 224 mg/dL / Ketone: x  / Bili: x / Urobili: x   Blood: x / Protein: x / Nitrite: x   Leuk Esterase: x / RBC: x / WBC x   Sq Epi: x / Non Sq Epi: x / Bacteria: x      CAPILLARY BLOOD GLUCOSE      POCT Blood Glucose.: 198 mg/dL (02 Nov 2023 08:45)  POCT Blood Glucose.: 272 mg/dL (01 Nov 2023 20:53)  POCT Blood Glucose.: 311 mg/dL (01 Nov 2023 18:38)      ABG - ( 02 Nov 2023 00:53 )  pH, Arterial: 7.37  pH, Blood: x     /  pCO2: 37    /  pO2: 102   / HCO3: 21    / Base Excess: -3.5  /  SaO2: 97.9              Urinalysis Basic - ( 02 Nov 2023 00:55 )    Color: x / Appearance: x / SG: x / pH: x  Gluc: 224 mg/dL / Ketone: x  / Bili: x / Urobili: x   Blood: x / Protein: x / Nitrite: x   Leuk Esterase: x / RBC: x / WBC x   Sq Epi: x / Non Sq Epi: x / Bacteria: x        RADIOLOGY & ADDITIONAL TESTS:    Imaging Personally Reviewed:  [x ] YES  [ ] NO    To Do's:    [ ] f/u INR in AM, if therapeutic INR 2-3, d/c heparin gtt  [ ] HF recs, AT eval  [ ] monitor Cr closely, LAURA  [ ] c/w bumex 2 mg PO, additional diuresis per HF recs  [ ] start metoprolol 12.5 q6 11/4  [ ] f/u chest xray tomorrow

## 2023-11-02 NOTE — PROGRESS NOTE ADULT - SUBJECTIVE AND OBJECTIVE BOX
ADVANCED HEART FAILURE & TRANSPLANT  - PROGRESS NOTE  *To reach the NS2 Team from 8am to 5pm (MON-FRI), please call [phone].   _______________________________________________________________________________________________________    Subjective:  - Seen laying flat in bed, s/p R IJ removal      Medications:  aspirin enteric coated 81 milliGRAM(s) Oral daily  atorvastatin 80 milliGRAM(s) Oral at bedtime  benzocaine/menthol Lozenge 1 Lozenge Oral every 2 hours PRN  buMETAnide 2 milliGRAM(s) Oral daily  chlorhexidine 2% Cloths 1 Application(s) Topical daily  digoxin     Tablet 125 MICROGram(s) Oral daily  heparin  Infusion 1200 Unit(s)/Hr IV Continuous <Continuous>  hydrALAZINE 50 milliGRAM(s) Oral every 8 hours  insulin glargine Injectable (LANTUS) 28 Unit(s) SubCutaneous at bedtime  insulin lispro (ADMELOG) corrective regimen sliding scale   SubCutaneous three times a day before meals  insulin lispro (ADMELOG) corrective regimen sliding scale   SubCutaneous at bedtime  insulin lispro Injectable (ADMELOG) 12 Unit(s) SubCutaneous three times a day before meals  isosorbide   dinitrate Tablet (ISORDIL) 20 milliGRAM(s) Oral three times a day  pantoprazole    Tablet 40 milliGRAM(s) Oral before breakfast  polyethylene glycol 3350 17 Gram(s) Oral daily PRN  senna 2 Tablet(s) Oral at bedtime  tamsulosin 0.4 milliGRAM(s) Oral at bedtime      Physical Exam:    Vitals:  Vital Signs Last 24 Hours  T(C): 37 (23 @ 03:00), Max: 37.4 (23 @ 19:00)  HR: 108 (23 @ 10:00) (103 - 118)  BP: 122//70  RR: 24 (23 @ 10:00) (19 - 49)  SpO2: 99% (23 @ 10:00) (92% - 99%)    Weight in k.8 ( @ 06:00)  I&O's Summary    2023 07:01  -  2023 07:00  --------------------------------------------------------  IN: 805.8 mL / OUT: 875 mL / NET: -69.2 mL    2023 07:01  -  2023 15:34  --------------------------------------------------------  IN: 36 mL / OUT: 0 mL / NET: 36 mL    Tele: -118    General: No distress. Comfortable.  HEENT: EOM intact.  Neck: JVP ~10cm of H2O  Chest: Clear to auscultation bilaterally  CV: Normal S1 and S2. No murmurs, rub, or gallops. Radial pulses normal.  Abdomen: Soft, non-distended, non-tender  Skin: No rashes or skin breakdown  Extremities: No LE edema  Neurology: Alert and oriented times three. Sensation intact  Psych: Affect normal    Labs:                      10.3   10.78 )-----------( 194      ( 2023 00:55 )             32.8         133<L>  |  100  |  48<H>  ----------------------------<  224<H>  5.1   |  20<L>  |  2.26<H>    Ca    9.0      2023 00:55  Phos  5.6       Mg     2.3         TPro  6.2  /  Alb  3.3  /  TBili  0.3  /  DBili  x   /  AST  21  /  ALT  42  /  AlkPhos  115      PT/INR - ( 2023 00:55 )   PT: 12.8 sec;   INR: 1.17 ratio    PTT - ( 2023 00:55 )  PTT:60.7 sec    Oxygen Saturation, Mixed: 64.2 ( @ 00:53)  Oxygen Saturation, Mixed: 58.2 ( @ 18:00)  Oxygen Saturation, Mixed: 54.9 ( @ 12:50)  Oxygen Saturation, Mixed: 62.6 ( @ 01:30)  Oxygen Saturation, Mixed: 54.5 (10-31 @ 18:00)  Oxygen Saturation, Mixed: 58.1 (10-31 @ 15:15)  Oxygen Saturation, Mixed: 59.1 (10-31 @ 00:04)    Lactate, Blood: 1.8 mmol/L (10-31 @ 18:09)  Lactate, Blood: 1.7 mmol/L (10-31 @ 15:22)  Lactate, Blood: 1.1 mmol/L (10-31 @ 00:22) ADVANCED HEART FAILURE & TRANSPLANT  - PROGRESS NOTE  *To reach the NS2 Team from 8am to 5pm (MON-FRI), please call [phone].   _______________________________________________________________________________________________________    Subjective:    - Seen laying flat in bed, s/p PA catheter removal, it was in the RV. He reports feeling well. CI prior removal of PA catheter was 2.3. He's been off milrinone since yesterday. He is tolerating escalation of hydralazine/ISDN. He received 2 doses of Bumex iv push.         Medications:  aspirin enteric coated 81 milliGRAM(s) Oral daily  atorvastatin 80 milliGRAM(s) Oral at bedtime  benzocaine/menthol Lozenge 1 Lozenge Oral every 2 hours PRN  buMETAnide 2 milliGRAM(s) Oral daily  chlorhexidine 2% Cloths 1 Application(s) Topical daily  digoxin     Tablet 125 MICROGram(s) Oral daily  heparin  Infusion 1200 Unit(s)/Hr IV Continuous <Continuous>  hydrALAZINE 50 milliGRAM(s) Oral every 8 hours  insulin glargine Injectable (LANTUS) 28 Unit(s) SubCutaneous at bedtime  insulin lispro (ADMELOG) corrective regimen sliding scale   SubCutaneous three times a day before meals  insulin lispro (ADMELOG) corrective regimen sliding scale   SubCutaneous at bedtime  insulin lispro Injectable (ADMELOG) 12 Unit(s) SubCutaneous three times a day before meals  isosorbide   dinitrate Tablet (ISORDIL) 20 milliGRAM(s) Oral three times a day  pantoprazole    Tablet 40 milliGRAM(s) Oral before breakfast  polyethylene glycol 3350 17 Gram(s) Oral daily PRN  senna 2 Tablet(s) Oral at bedtime  tamsulosin 0.4 milliGRAM(s) Oral at bedtime      Physical Exam:    Vitals:  Vital Signs Last 24 Hours  T(C): 37 (23 @ 03:00), Max: 37.4 (23 @ 19:00)  HR: 108 (23 @ 10:00) (103 - 118)  BP: 122//70  RR: 24 (23 @ 10:00) (19 - 49)  SpO2: 99% (23 @ 10:00) (92% - 99%)    Weight in k.8 ( @ 06:00)  I&O's Summary    2023 07:  -  2023 07:00  --------------------------------------------------------  IN: 805.8 mL / OUT: 875 mL / NET: -69.2 mL    2023 07:01  -  2023 15:34  --------------------------------------------------------  IN: 36 mL / OUT: 0 mL / NET: 36 mL    Tele: -118    General: No distress. Comfortable.  HEENT: EOM intact.  Neck: JVP ~10cm of H2O  Chest: Clear to auscultation bilaterally  CV: Normal S1 and S2. No murmurs, rub, or gallops. Radial pulses normal.  Abdomen: Soft, non-distended, non-tender  Skin: No rashes or skin breakdown  Extremities: No LE edema  Neurology: Alert and oriented times three. Sensation intact  Psych: Affect normal    Labs:                      10.3   10.78 )-----------( 194      ( 2023 00:55 )             32.8         133<L>  |  100  |  48<H>  ----------------------------<  224<H>  5.1   |  20<L>  |  2.26<H>    Ca    9.0      2023 00:55  Phos  5.6       Mg     2.3         TPro  6.2  /  Alb  3.3  /  TBili  0.3  /  DBili  x   /  AST  21  /  ALT  42  /  AlkPhos  115      PT/INR - ( 2023 00:55 )   PT: 12.8 sec;   INR: 1.17 ratio    PTT - ( 2023 00:55 )  PTT:60.7 sec    Oxygen Saturation, Mixed: 64.2 ( @ 00:53)  Oxygen Saturation, Mixed: 58.2 ( @ 18:00)  Oxygen Saturation, Mixed: 54.9 ( @ 12:50)  Oxygen Saturation, Mixed: 62.6 ( @ 01:30)  Oxygen Saturation, Mixed: 54.5 (10-31 @ 18:00)  Oxygen Saturation, Mixed: 58.1 (10-31 @ 15:15)  Oxygen Saturation, Mixed: 59.1 (10-31 @ 00:04)    Lactate, Blood: 1.8 mmol/L (10-31 @ 18:09)  Lactate, Blood: 1.7 mmol/L (10-31 @ 15:22)  Lactate, Blood: 1.1 mmol/L (10-31 @ 00:22)

## 2023-11-02 NOTE — PROGRESS NOTE ADULT - SUBJECTIVE AND OBJECTIVE BOX
BRANDEN MARRUFO  MRN-[id number]  Patient is a 58y old  Male who presents with a chief complaint of NSTEMI (02 Nov 2023 15:32)    HPI:  Patient with separate chart from [facility], MRN# [id number]    59 yo Male pmhx CVA with mild left sided deficits, HTN, DM2, vertigo, HLD, Mobitz II s/pp Medtronic dual chamber ICD (12/21/22) initially presented to [facility] from home with complaints of dyspnea and chest pain and was admitted w/ acute hypoxic respiratory failure likely due to acute pulmonary edema due to acute HFrEF in setting of acute NSTEMI, LAURA and enterovirus infection. Pt with brief MICU stay at [facility] requiring bipap (no intubation), was treated for PNA with cefepime, and was found to have TTE done 9/26/23 showing EF 20% with severely decreased LVSF, multiple regional wall motion abnormalities, and elevated LV end diastolic pressure. Pt was transferred to [facility] for cardiac cath evaluation. Patient without any acute complaints, complaining of intermittent palpitations for the last 2 days. No chest pain, SOB, fevers, nausea, vomiting, abdominal pain.  (13 Oct 2023 21:05)      Hospital Course:    24 HOUR EVENTS:    REVIEW OF SYSTEMS:    CONSTITUTIONAL: No weakness, fevers or chills  EYES/ENT: No visual changes;  No vertigo or throat pain   NECK: No pain or stiffness  RESPIRATORY: No cough, wheezing, hemoptysis; No shortness of breath  CARDIOVASCULAR: No chest pain or palpitations  GASTROINTESTINAL: No abdominal or epigastric pain. No nausea, vomiting, or hematemesis; No diarrhea or constipation. No melena or hematochezia.  GENITOURINARY: No dysuria, frequency or hematuria  NEUROLOGICAL: No numbness or weakness  SKIN: No itching, rashes      ICU Vital Signs Last 24 Hrs  T(C): 36.3 (02 Nov 2023 19:00), Max: 37.3 (01 Nov 2023 23:00)  T(F): 97.4 (02 Nov 2023 19:00), Max: 99.1 (01 Nov 2023 23:00)  HR: 108 (02 Nov 2023 20:00) (103 - 118)  BP: --  BP(mean): --  ABP: 117/62 (02 Nov 2023 20:00) (105/58 - 148/78)  ABP(mean): 78 (02 Nov 2023 20:00) (73 - 99)  RR: 22 (02 Nov 2023 20:00) (19 - 47)  SpO2: 96% (02 Nov 2023 20:00) (92% - 99%)    O2 Parameters below as of 02 Nov 2023 19:00  Patient On (Oxygen Delivery Method): nasal cannula  O2 Flow (L/min): 2          CVP(mm Hg): 17 (11-02-23 @ 05:00) (6 - 18)  CO: 5.1 (11-02-23 @ 00:00) (5.1 - 5.1)  CI: 2.7 (11-02-23 @ 00:00) (2.7 - 2.7)  PA: 54/11 (11-02-23 @ 05:00) (40/19 - 81/49)  PA(mean): 29 (11-02-23 @ 05:00) (29 - 65)  PA(direct): --  PCWP: --  LA: --  RA: --  SVR: 1002 (11-02-23 @ 00:00) (1002 - 1002)  SVRI: 1893 (11-02-23 @ 00:00) (1893 - 1893)  PVR: --  PVRI: --  I&O's Summary    01 Nov 2023 07:01  -  02 Nov 2023 07:00  --------------------------------------------------------  IN: 805.8 mL / OUT: 875 mL / NET: -69.2 mL    02 Nov 2023 07:01  -  02 Nov 2023 20:33  --------------------------------------------------------  IN: 528 mL / OUT: 400 mL / NET: 128 mL        CAPILLARY BLOOD GLUCOSE    CAPILLARY BLOOD GLUCOSE      POCT Blood Glucose.: 223 mg/dL (02 Nov 2023 15:32)      PHYSICAL EXAM:  GENERAL: No acute distress, well-developed  HEAD:  Atraumatic, Normocephalic  EYES: EOMI, PERRLA, conjunctiva and sclera clear  NECK: Supple, no lymphadenopathy, no JVD  CHEST/LUNG: CTAB; No wheezes, rales, or rhonchi  HEART: Regular rate and rhythm. Normal S1/S2. No murmurs, rubs, or gallops  ABDOMEN: Soft, non-tender, non-distended; normal bowel sounds, no organomegaly  EXTREMITIES:  2+ peripheral pulses b/l, No clubbing, cyanosis, or edema  NEUROLOGY: A&O x 3, no focal deficits  SKIN: No rashes or lesions    ============================I/O===========================   I&O's Detail    01 Nov 2023 07:01  -  02 Nov 2023 07:00  --------------------------------------------------------  IN:    Heparin: 288 mL    Milrinone: 7.8 mL    Oral Fluid: 510 mL  Total IN: 805.8 mL    OUT:    Voided (mL): 875 mL  Total OUT: 875 mL    Total NET: -69.2 mL      02 Nov 2023 07:01  -  02 Nov 2023 20:33  --------------------------------------------------------  IN:    Heparin: 168 mL    Oral Fluid: 360 mL  Total IN: 528 mL    OUT:    Voided (mL): 400 mL  Total OUT: 400 mL    Total NET: 128 mL        ============================ LABS =========================                        10.3   10.78 )-----------( 194      ( 02 Nov 2023 00:55 )             32.8     11-02    133<L>  |  100  |  48<H>  ----------------------------<  224<H>  5.1   |  20<L>  |  2.26<H>    Ca    9.0      02 Nov 2023 00:55  Phos  5.6     11-02  Mg     2.3     11-02    TPro  6.2  /  Alb  3.3  /  TBili  0.3  /  DBili  x   /  AST  21  /  ALT  42  /  AlkPhos  115  11-02                LIVER FUNCTIONS - ( 02 Nov 2023 00:55 )  Alb: 3.3 g/dL / Pro: 6.2 g/dL / ALK PHOS: 115 U/L / ALT: 42 U/L / AST: 21 U/L / GGT: x           PT/INR - ( 02 Nov 2023 00:55 )   PT: 12.8 sec;   INR: 1.17 ratio         PTT - ( 02 Nov 2023 00:55 )  PTT:60.7 sec  ABG - ( 02 Nov 2023 00:53 )  pH, Arterial: 7.37  pH, Blood: x     /  pCO2: 37    /  pO2: 102   / HCO3: 21    / Base Excess: -3.5  /  SaO2: 97.9              Blood Gas Arterial, Lactate: 0.8 mmol/L (11-02-23 @ 00:53)  Blood Gas Venous - Lactate: 1.3 mmol/L (11-01-23 @ 18:00)  Blood Gas Venous - Lactate: 1.2 mmol/L (11-01-23 @ 12:50)  Blood Gas Arterial, Lactate: 1.2 mmol/L (11-01-23 @ 01:30)  Lactate, Blood: 1.8 mmol/L (10-31-23 @ 18:09)  Lactate, Blood: 1.7 mmol/L (10-31-23 @ 15:22)  Lactate, Blood: 1.1 mmol/L (10-31-23 @ 00:22)  Blood Gas Arterial, Lactate: 1.2 mmol/L (10-31-23 @ 00:04)    Urinalysis Basic - ( 02 Nov 2023 00:55 )    Color: x / Appearance: x / SG: x / pH: x  Gluc: 224 mg/dL / Ketone: x  / Bili: x / Urobili: x   Blood: x / Protein: x / Nitrite: x   Leuk Esterase: x / RBC: x / WBC x   Sq Epi: x / Non Sq Epi: x / Bacteria: x  ======================Micro/Rad/Cardio=================  Telemtry: Reviewed   EKG: Reviewed  CXR: Reviewed  Culture: Reviewed   Echo:   Cath:   ======================================================  PAST MEDICAL & SURGICAL HISTORY:  CVA (cerebrovascular accident)      T2DM (type 2 diabetes mellitus)      HTN (hypertension)      HLD (hyperlipidemia)      S/P cystoscopy      S/P hernia repair  ====================ASSESSMENT ==============  59 yo Male pmhx CVA with mild left sided deficits, HTN, DM2, vertigo, HLD, Mobitz II s/pp Medtronic dual chamber ICD (12/21/22) initially presented to OSH for SOB c/f ADHF vs. PNA, later found to have NSTEMI transferred to [facility] for LHC evaluation. 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 Admitted to CICU for hypotension, inotropic support, hemodynamic monitoring. Previously downgraded on 10/22, RRT called while on the floor for AMS and hypotensive to 80/40, CTH 10/27 found possible R cerebellar infarct, patient re-transferred to CICU for inotropic support.  ==================================================================================  NEUROLOGICAL  #L Parietal and R PICA infarct/ embolic shower  #Encephalopathy   #Hx of CVA w/ mild L residual deficit  - currently A&Ox2-3  - MR head 10/30 w/ R PICA infarct and L parietal infarct, likely due to embolic shower, per neuro. Very mild hemorrhagic transformation in R PICA distribution   - MR neck showing occluded R-sided intracranial vertebral artery of unknown timeframe and mild focal stenosis of the R supraclinoid intracranial internal   carotid artery secondary to atherosclerosis  - per neuro, c/w heparin gtt as repeat CT head w/ no hemorrhage  - CT head 10/27 w/ atrophy and small vessel white matter ischemic changes  - neurology following  - B12, TSH, RPR wnl  - neuro checks    CARDIOVASCULAR  #Hypotension/cardiogenic shock, iso acute on chronic HFrEF  TTE 10/16: EF 20%, severely reduced LVSF  LHC 10/16: 95% pLAD, 80% mid and distal LCx, 90% mid and distal RCA  RHC 10/19: RA 10, PCWP 20, CO 4.4, CI 2.3  cMR 10/18: limited viability in LAD territory  TTE 10/26: EF <20 %, LV apical thrombus is seen. Small pericardial effusion noted adjacent to the right ventricle with no evidence of hemodynamic compromise.  -R groin swan placed 10/30: RA 14/13/12, RV 51/14, PA 47/34/39, PCW 34/36/32, CI 2.5/CO 4.95   - RRT called for 80/40 while on the floor, milrinone 0.125 restarted on 10/28, weaned off 11/1  - trend lactate, proBNP  - c/w bumex 2 mg PO qd  -s/p bumex 1 this AM  - presently net positive, consider further bumex as BP and Cr allows  - pending HF recs regarding high risk PCI vs further intervention; EP recommended no indication for upgrade at this time. HF holding off on launching VAD/transplant eval  - hold Entresto i/s/o hypotension  - hydral increased to 50 tid  - s/p dig load, dig level 1.4, acceptable per HF   - f/u device interrogation  - avoid BB/CCB given borderline cardiac function  - start GDMT when appropriate, start metoprolol and farxiga tomorrow    #Triple vessel disease  [facility] 10/16: 95% pLAD, 80% mid and distal LCx, 90% mid and distal RCA  -per CT surg, pt is not a good candidate for CABG given lack of viability in the LAD territory  -pending HF AT eval    #LV apical thrombus  -c/w heparin gtt  -MRI brain and MRA H/N showing 2 infarcts  -neuro checks  -not a candidate for impella assisted PCI    #Tachycardia  Mobitz II s/p AICD  - Sinus tach vs. atach vs. aflutter. Iso increased cardiac demand/ hypoxia vs. iso infxn?  - per EP, appears sinus tach, no indication for cardioversion/EMILEE  - s/p dig load, dig level 1.4, acceptable per HF   - per EP, no need to upgrade device at this time    #HLD  - c/w Atorv 80mg     RESPIRATORY  #Pulmonary edema  Likely iso CHF vs. superimposed PNA  CT chest 10/28: scattered patchy and nodular bilateral upper lobe predominant groundglass opacities with interlobular septal thickening representing pulmonary edema  -c/w Bumex 2 qd   -infectious cause not excluded, low threshold to add abx if leukocytosis or febrile, as pt persistently tachy  -f/u noncontrast CT chest in 1-3 month to ensure resolution    GI/NUTRITION  No active issues  - CC DASH Halal diet  - GI ppx: PPI 40mg daily  - c/w Bowel regimen    /RENAL  #LAURA:   - sCr on admission 1.28, stable today although remains elevated, ddx: iso congestion vs. prerenal from diuresis  -consider urine lytes  -Trend sCr  -Monitor I/O  -c/w diuresis    #Elevated lactate - Improving  - uptrending lactate likely iso cardiogenic shock  - now at normal range  - c/w Milrinone, wean as tolerated  - continue to trend    #BPH  - c/w Flomax 0.4mg qhs    SKIN  - no active issues    INFECTIOUS DISEASE  - No active issues  - plan for pulmonary edema as above  - Recent admission to OSH for ?PNA s/p 14 days of cefepime (last dose 10/14)  - Currently no signs of infection, WBC normal, afebrile, although monitor iso persistent tachycardia    ENDOCRINE    #DM2  - A1c 10/14 8.4%  - increased Lantus 28u qhs + lispro 12u premeal + ISS  - trend FS, today were elevated    #Thyroid nodule  CT chest: 3. 3 x 1.8 cm left supraclavicular nodule likely arising from the thyroid gland   -thyroid US outpatient    HEMATOLOGIC/DVT PPX  - No active issues  - DVT ppx: heparin gtt    #ETHICS  Full code    Patient requires continuous monitoring with bedside rhythm monitoring, pulse ox monitoring, and intermittent blood gas analysis. Care plan discussed with ICU care team. Patient remained critical and at risk for life threatening decompensation.  Patient seen, examined and plan discussed with CCU team during rounds.     I have personally provided ____ minutes of critical care time excluding time spent on separate procedures, in addition to initial critical care time provided by the CICU Attending, Dr. Garcia.    By signing my name below, I, Judi Bai, attest that this documentation has been prepared under the direction and in the presence of VALERY Trejo.  Electronically signed: Ashley Nayak, 11-02-23 @ 20:33    I, May Hanna , personally performed the services described in this documentation. all medical record entries made by the ashley were at my direction and in my presence. I have reviewed the chart and agree that the record reflects my personal performance and is accurate and complete  Electronically signed: VALERY Trejo.       BRANDEN MARRUFO  MRN-[id number]  Patient is a 58y old  Male who presents with a chief complaint of NSTEMI (02 Nov 2023 15:32)    HPI:  Patient with separate chart from [facility], MRN# [id number]    59 yo Male pmhx CVA with mild left sided deficits, HTN, DM2, vertigo, HLD, Mobitz II s/pp Medtronic dual chamber ICD (12/21/22) initially presented to [facility] from home with complaints of dyspnea and chest pain and was admitted w/ acute hypoxic respiratory failure likely due to acute pulmonary edema due to acute HFrEF in setting of acute NSTEMI, LAURA and enterovirus infection. Pt with brief MICU stay at [facility] requiring bipap (no intubation), was treated for PNA with cefepime, and was found to have TTE done 9/26/23 showing EF 20% with severely decreased LVSF, multiple regional wall motion abnormalities, and elevated LV end diastolic pressure. Pt was transferred to [facility] for cardiac cath evaluation. Patient without any acute complaints, complaining of intermittent palpitations for the last 2 days. No chest pain, SOB, fevers, nausea, vomiting, abdominal pain.  (13 Oct 2023 21:05)    24 HOUR EVENTS:  - s/p Bumex 1 this AM    ICU Vital Signs Last 24 Hrs  T(C): 36.3 (02 Nov 2023 19:00), Max: 37.3 (01 Nov 2023 23:00)  T(F): 97.4 (02 Nov 2023 19:00), Max: 99.1 (01 Nov 2023 23:00)  HR: 108 (02 Nov 2023 20:00) (103 - 118)  ABP: 117/62 (02 Nov 2023 20:00) (105/58 - 148/78)  ABP(mean): 78 (02 Nov 2023 20:00) (73 - 99)  RR: 22 (02 Nov 2023 20:00) (19 - 47)  SpO2: 96% (02 Nov 2023 20:00) (92% - 99%)    O2 Parameters below as of 02 Nov 2023 19:00  Patient On (Oxygen Delivery Method): nasal cannula  O2 Flow (L/min): 2    CVP(mm Hg): 17 (11-02-23 @ 05:00) (6 - 18)  CO: 5.1 (11-02-23 @ 00:00) (5.1 - 5.1)  CI: 2.7 (11-02-23 @ 00:00) (2.7 - 2.7)  PA: 54/11 (11-02-23 @ 05:00) (40/19 - 81/49)  PA(mean): 29 (11-02-23 @ 05:00) (29 - 65)  SVR: 1002 (11-02-23 @ 00:00) (1002 - 1002)  SVRI: 1893 (11-02-23 @ 00:00) (1893 - 1893)    I&O's Summary    01 Nov 2023 07:01  -  02 Nov 2023 07:00  --------------------------------------------------------  IN: 805.8 mL / OUT: 875 mL / NET: -69.2 mL    02 Nov 2023 07:01  -  02 Nov 2023 20:33  --------------------------------------------------------  IN: 528 mL / OUT: 400 mL / NET: 128 mL    CAPILLARY BLOOD GLUCOSE  POCT Blood Glucose.: 223 mg/dL (02 Nov 2023 15:32)    PHYSICAL EXAM:  GENERAL: NAD  NECK: Supple, no lymphadenopathy  CHEST/LUNG: CTAB; No wheezes, rales, or rhonchi  HEART: Regular rate and rhythm. Normal S1/S2  ABDOMEN: Soft, non-tender, non-distended  EXTREMITIES: No clubbing, cyanosis, or edema  NEUROLOGY: A&O x 2-3, no focal deficits    ============================I/O===========================   I&O's Detail    01 Nov 2023 07:01  -  02 Nov 2023 07:00  --------------------------------------------------------  IN:    Heparin: 288 mL    Milrinone: 7.8 mL    Oral Fluid: 510 mL  Total IN: 805.8 mL    OUT:    Voided (mL): 875 mL  Total OUT: 875 mL    Total NET: -69.2 mL    02 Nov 2023 07:01  -  02 Nov 2023 20:33  --------------------------------------------------------  IN:    Heparin: 168 mL    Oral Fluid: 360 mL  Total IN: 528 mL    OUT:    Voided (mL): 400 mL  Total OUT: 400 mL    Total NET: 128 mL    ============================ LABS =========================                    10.3   10.78 )-----------( 194      ( 02 Nov 2023 00:55 )             32.8     11-02    133<L>  |  100  |  48<H>  ----------------------------<  224<H>  5.1   |  20<L>  |  2.26<H>    Ca    9.0      02 Nov 2023 00:55  Phos  5.6     11-02  Mg     2.3     11-02    TPro  6.2  /  Alb  3.3  /  TBili  0.3  /  DBili  x   /  AST  21  /  ALT  42  /  AlkPhos  115  11-02    LIVER FUNCTIONS - ( 02 Nov 2023 00:55 )  Alb: 3.3 g/dL / Pro: 6.2 g/dL / ALK PHOS: 115 U/L / ALT: 42 U/L / AST: 21 U/L / GGT: x           PT/INR - ( 02 Nov 2023 00:55 )   PT: 12.8 sec;   INR: 1.17 ratio       PTT - ( 02 Nov 2023 00:55 )  PTT:60.7 sec  ABG - ( 02 Nov 2023 00:53 )  pH, Arterial: 7.37  pH, Blood: x     /  pCO2: 37    /  pO2: 102   / HCO3: 21    / Base Excess: -3.5  /  SaO2: 97.9      Blood Gas Arterial, Lactate: 0.8 mmol/L (11-02-23 @ 00:53)  Blood Gas Venous - Lactate: 1.3 mmol/L (11-01-23 @ 18:00)  Blood Gas Venous - Lactate: 1.2 mmol/L (11-01-23 @ 12:50)  Blood Gas Arterial, Lactate: 1.2 mmol/L (11-01-23 @ 01:30)  Lactate, Blood: 1.8 mmol/L (10-31-23 @ 18:09)  Lactate, Blood: 1.7 mmol/L (10-31-23 @ 15:22)  Lactate, Blood: 1.1 mmol/L (10-31-23 @ 00:22)  Blood Gas Arterial, Lactate: 1.2 mmol/L (10-31-23 @ 00:04)    Urinalysis Basic - ( 02 Nov 2023 00:55 )    Color: x / Appearance: x / SG: x / pH: x  Gluc: 224 mg/dL / Ketone: x  / Bili: x / Urobili: x   Blood: x / Protein: x / Nitrite: x   Leuk Esterase: x / RBC: x / WBC x   Sq Epi: x / Non Sq Epi: x / Bacteria: x  ======================Micro/Rad/Cardio=================  Telemtry: Reviewed   EKG: Reviewed  CXR: Reviewed  Culture: Reviewed   Echo:   Cath:   ======================================================  PAST MEDICAL & SURGICAL HISTORY:  CVA (cerebrovascular accident)      T2DM (type 2 diabetes mellitus)      HTN (hypertension)      HLD (hyperlipidemia)      S/P cystoscopy      S/P hernia repair    ====================ASSESSMENT ==============  59 yo Male pmhx CVA with mild left sided deficits, HTN, DM2, vertigo, HLD, Mobitz II s/pp Medtronic dual chamber ICD (12/21/22) initially presented to OSH for SOB c/f ADHF vs. PNA, later found to have NSTEMI transferred to [facility] for LHC evaluation. 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 Admitted to CICU for hypotension, inotropic support, hemodynamic monitoring. Previously downgraded on 10/22, RRT called while on the floor for AMS and hypotensive to 80/40, CTH 10/27 found possible R cerebellar infarct, patient re-transferred to CICU for inotropic support.    ==================================================================================  NEUROLOGICAL  #L Parietal and R PICA infarct/ embolic shower  #Encephalopathy  #Hx of CVA w/ mild L residual deficit  - currently A&Ox2-3  - MR head 10/30 w/ R PICA infarct and L parietal infarct, likely due to embolic shower, per neuro. Very mild hemorrhagic transformation in R PICA distribution   - MR neck showing occluded R-sided intracranial vertebral artery of unknown timeframe and mild focal stenosis of the R supraclinoid intracranial internal   carotid artery secondary to atherosclerosis  - per neuro, c/w heparin gtt as repeat CT head w/ no hemorrhage  - CT head 10/27 w/ atrophy and small vessel white matter ischemic changes  - neurology following  - B12, TSH, RPR wnl    CARDIOVASCULAR  #Hypotension/cardiogenic shock, iso acute on chronic HFrEF  TTE 10/16: EF 20%, severely reduced LVSF  LHC 10/16: 95% pLAD, 80% mid and distal LCx, 90% mid and distal RCA  RHC 10/19: RA 10, PCWP 20, CO 4.4, CI 2.3  cMR 10/18: limited viability in LAD territory  TTE 10/26: EF <20 %, LV apical thrombus is seen. Small pericardial effusion noted adjacent to the right ventricle with no evidence of hemodynamic compromise.  - R groin swan placed 10/30: RA 14/13/12, RV 51/14, PA 47/34/39, PCW 34/36/32, CI 2.5/CO 4.95; [place] and intro d/c'd 11/2  - RRT called for 80/40 while on the floor, milrinone 0.125 restarted on 10/28, weaned off 11/1  - s/p bumex 1 this AM  - pending HF recs regarding high risk PCI vs further intervention; EP recommended no indication for upgrade at this time. HF holding off on launching VAD/transplant eval.  - hydral increased to 50 tid  - avoid BB/CCB given borderline cardiac function  - start GDMT when appropriate, start metoprolol and farxiga tomorrow    #Triple vessel disease  [facility] 10/16: 95% pLAD, 80% mid and distal LCx, 90% mid and distal RCA  -per CT surg, pt is not a good candidate for CABG given lack of viability in the LAD territory  -pending HF AT eval    #LV apical thrombus  -c/w heparin gtt  -MRI brain and MRA H/N showing 2 infarcts  -neuro checks  -not a candidate for impella assisted PCI    #Tachycardia  Mobitz II s/p AICD  - Sinus tach vs. atach vs. aflutter. Iso increased cardiac demand/ hypoxia vs. iso infxn?  - per EP, appears sinus tach, no indication for cardioversion/EMILEE  - s/p dig load, dig level 1.4, acceptable per HF   - per EP, no need to upgrade device at this time    #HLD  - c/w Atorv 80mg     RESPIRATORY  #Pulmonary edema  Likely iso CHF vs. superimposed PNA  CT chest 10/28: scattered patchy and nodular bilateral upper lobe predominant groundglass opacities with interlobular septal thickening representing pulmonary edema  -c/w Bumex 2 qd  -infectious cause not excluded, low threshold to add abx if leukocytosis or febrile, as pt persistently tachy  -f/u noncontrast CT chest in 1-3 month to ensure resolution    GI/NUTRITION  No active issues  - CC DASH Halal diet  - GI ppx: PPI 40mg daily  - c/w Bowel regimen    /RENAL  #LAURA  - sCr on admission 1.28, stable today although remains elevated, ddx: iso congestion vs. prerenal from diuresis  -consider urine lytes  -Trend sCr  -Monitor I/O  -c/w diuresis    #BPH  - c/w Flomax 0.4mg qhs    SKIN  - no active issues    INFECTIOUS DISEASE  - No active issues  - plan for pulmonary edema as above  - Recent admission to OSH for ?PNA s/p 14 days of cefepime (last dose 10/14)  - Currently no signs of infection, WBC normal, afebrile, although monitor iso persistent tachycardia    ENDOCRINE  #DM  - A1c 10/14 8.4%  - increased Lantus 28u qhs + lispro 12u premeal + ISS  - trend FS, today were elevated    #Thyroid nodule  CT chest: 3. 3 x 1.8 cm left supraclavicular nodule likely arising from the thyroid gland   - thyroid US outpatient    HEMATOLOGIC/DVT PPX  - No active issues  - DVT ppx: heparin gtt    #ETHICS  Full code    Patient requires continuous monitoring with bedside rhythm monitoring, pulse ox monitoring, and intermittent blood gas analysis. Care plan discussed with ICU care team. Patient remained critical and at risk for life threatening decompensation.  Patient seen, examined and plan discussed with CICU team during rounds.     I have personally provided 30 minutes of critical care time excluding time spent on separate procedures, in addition to initial critical care time provided by the CICU Attending, Dr. Garcia.    By signing my name below, I, Judi Bai, attest that this documentation has been prepared under the direction and in the presence of VALERY Trejo.  Electronically signed: Ashley Nayak, 11-02-23 @ 20:33    I, May Hanna , personally performed the services described in this documentation. all medical record entries made by the ashley were at my direction and in my presence. I have reviewed the chart and agree that the record reflects my personal performance and is accurate and complete  Electronically signed: VALERY Trejo.

## 2023-11-02 NOTE — PROGRESS NOTE ADULT - NS ATTEND AMEND GEN_ALL_CORE FT
The patient was found in bed in NAD. His milrinone was discontinued yesterday and afterload reduction started with hydralazine/ISDN, which he is tolerating. MAP remains >80s, HR low 100s. CXR remains with pulmonary vascular congestion. Renal function is stable. POCUS shows IVC ~2 cm but collapsing.       Increase hydralazine to 50 mg TID   Continue ISDN 20 mg TID   Bumex 2 mg daily for maintenance   Digoxin 125 mcg daily - check levels tomorrow   Start metoprolol 12.5 mg TID tomorrow morning   Continue A/C for LV apical thrombus  PT follow up / Get OOB to chair/ give IS   Psychology follow-up, they recommend neuropsychological evaluation   Neurology follow up for evolving stroke  Discussed with patient and family at bedside

## 2023-11-02 NOTE — PROGRESS NOTE ADULT - SUBJECTIVE AND OBJECTIVE BOX
Neurology        S: patient seen and examined. MRI confirms embolic infarcts           Medications: MEDICATIONS  (STANDING):  aspirin enteric coated 81 milliGRAM(s) Oral daily  atorvastatin 80 milliGRAM(s) Oral at bedtime  buMETAnide 2 milliGRAM(s) Oral daily  buMETAnide Injectable 1 milliGRAM(s) IV Push once  chlorhexidine 2% Cloths 1 Application(s) Topical daily  digoxin     Tablet 125 MICROGram(s) Oral daily  heparin  Infusion 1200 Unit(s)/Hr (12 mL/Hr) IV Continuous <Continuous>  hydrALAZINE 50 milliGRAM(s) Oral every 8 hours  insulin glargine Injectable (LANTUS) 28 Unit(s) SubCutaneous at bedtime  insulin lispro (ADMELOG) corrective regimen sliding scale   SubCutaneous three times a day before meals  insulin lispro (ADMELOG) corrective regimen sliding scale   SubCutaneous at bedtime  insulin lispro Injectable (ADMELOG) 12 Unit(s) SubCutaneous three times a day before meals  isosorbide   dinitrate Tablet (ISORDIL) 20 milliGRAM(s) Oral three times a day  pantoprazole    Tablet 40 milliGRAM(s) Oral before breakfast  senna 2 Tablet(s) Oral at bedtime  tamsulosin 0.4 milliGRAM(s) Oral at bedtime    MEDICATIONS  (PRN):  benzocaine/menthol Lozenge 1 Lozenge Oral every 2 hours PRN Sore Throat  polyethylene glycol 3350 17 Gram(s) Oral daily PRN Constipation       Vitals:  Vital Signs Last 24 Hrs  T(C): 37 (02 Nov 2023 03:00), Max: 37.4 (01 Nov 2023 19:00)  T(F): 98.6 (02 Nov 2023 03:00), Max: 99.3 (01 Nov 2023 19:00)  HR: 106 (02 Nov 2023 09:00) (103 - 118)  BP: --  BP(mean): --  RR: 36 (02 Nov 2023 09:00) (19 - 49)  SpO2: 94% (02 Nov 2023 09:00) (92% - 98%)    Parameters below as of 02 Nov 2023 09:00  Patient On (Oxygen Delivery Method): nasal cannula  O2 Flow (L/min): 3          General Exam:   General Appearance: Appropriately dressed and in no acute distress       Head: Normocephalic, atraumatic and no dysmorphic features  Ear, Nose, and Throat: Moist mucous membranes  CVS: S1S2+  Resp: No SOB, no wheeze or rhonchi  GI: soft NT/ND  Extremities: No edema or cyanosis  Skin: No bruises or rashes     Neurological Exam:  Mental Status: Awake, alert and oriented x 2.  Able to follow simple verbal commands. Able to name and repeat. fluent speech. No obvious aphasia or dysarthria noted. poor insight. not understanding why he is in hospital   Cranial Nerves: PERRL, EOMI, VFFC, sensation V1-V3 intact,  no obvious facial asymmetry, equal elevation of palate, scm/trap 5/5, tongue is midline on protrusion. no obvious papilledema on fundoscopic exam. hearing is grossly intact.   Motor: Normal bulk, tone and strength throughout. Fine finger movements were intact and symmetric. no tremors or drift noted.    Sensation: Intact to light touch and pinprick throughout. no right/left confusion. no extinction to tactile on DSS. Romberg was negative.   Reflexes: 1+ throughout at biceps, brachioradialis, triceps, patellars and ankles bilaterally and equal. No clonus. R toe and L toe were both downgoing.  Coordination: No dysmetria on FNF or HKS  Gait:   no limitations in gait.     Data/Labs/Imaging which I personally reviewed.     Labs:     LABS:                          10.3   10.78 )-----------( 194      ( 02 Nov 2023 00:55 )             32.8     11-02    133<L>  |  100  |  48<H>  ----------------------------<  224<H>  5.1   |  20<L>  |  2.26<H>    Ca    9.0      02 Nov 2023 00:55  Phos  5.6     11-02  Mg     2.3     11-02    TPro  6.2  /  Alb  3.3  /  TBili  0.3  /  DBili  x   /  AST  21  /  ALT  42  /  AlkPhos  115  11-02    LIVER FUNCTIONS - ( 02 Nov 2023 00:55 )  Alb: 3.3 g/dL / Pro: 6.2 g/dL / ALK PHOS: 115 U/L / ALT: 42 U/L / AST: 21 U/L / GGT: x           PT/INR - ( 02 Nov 2023 00:55 )   PT: 12.8 sec;   INR: 1.17 ratio         PTT - ( 02 Nov 2023 00:55 )  PTT:60.7 sec  Urinalysis Basic - ( 02 Nov 2023 00:55 )    Color: x / Appearance: x / SG: x / pH: x  Gluc: 224 mg/dL / Ketone: x  / Bili: x / Urobili: x   Blood: x / Protein: x / Nitrite: x   Leuk Esterase: x / RBC: x / WBC x   Sq Epi: x / Non Sq Epi: x / Bacteria: x          < from: CT Head No Cont (10.27.23 @ 18:04) >    ACC: [id number] EXAM:  CT BRAIN   ORDERED BY: BRETT HOPSON     PROCEDURE DATE:  10/27/2023          INTERPRETATION:  Clinical Indication: CVA    5mm axial sections of the brain were obtained from base to vertex,   without the intravenous administration of contrast material. Coronal and   sagittal computer generated reconstructed views are available.    No prior brain imaging is available for comparison.          The ventricles and sulci are prominent consistent with mild atrophy.   Small vesselwhite matter ischemic changes are noted. There is a infarct   in the right medial cerebellum of undetermined age which could be acute   to subacute based on its degree of lucency. There is no significant   hemorrhage. Bone window examination is unremarkable. There is been   previous right-sided lens replacement surgery.      IMPRESSION: Atrophy and small vessel white matter ischemic changes. Right   medial cerebellar infarct may be acute versus subacute. No hemorrhage.      --- End of Report ---    < from: MR Angio Head No Cont (10.30.23 @ 20:58) >    ACC: [id number] EXAM:  MR ANGIO BRAIN   ORDERED BY: NATALIE CARRANZA     ACC: [id number] EXAM:  MR ANGIO NECK   ORDERED BY: NATALIE CARRANZA     ACC: [id number] EXAM:  MR BRAIN   ORDERED BY: NAVNEET CORONADO     PROCEDURE DATE:  10/30/2023          INTERPRETATION:  .    CLINICAL INFORMATION: Stroke.    TECHNIQUE: Multiplanar multi sequential MRI examination of the brain was   performed without the administration of IV gadolinium. MRA images through   the neck and [place] were obtained usinga combination of 2-D   and 3-D time-of-flight acquisition. The data was then reformatted into a   volumetric data set and reviewed as rotational MIP images.    COMPARISON: Most recent prior CT examination of the head from 10/27/2023.   No prior MRI studies are available for comparison.    FINDINGS:    MRI Brain: A wedge-shaped area of diffusion restriction is seen within   the right PICA territory. Associated T2/FLAIR hyperintense signal is also   seen, compatible with cytotoxic edema. Mild hemorrhagic transformation is   seen within the infarct bed manifested by high signal on T1-weighted   imaging as well as susceptibility artifact on the SWI sequence. Mild mass   effect is seen without effacement of the fourth ventricle or shift of the   posterior fossa midline structures.    This is superimposed upon encephalomalacia and gliosis involving the   right cerebellar hemisphere.    An additional vague area of diffusion reduction is seen within the left   parietal periventricular white matter without associated T2/FLAIR   hyperintense signal, compatible with cytotoxic edema. No gross   hemorrhagic transformation is noted in this location.    Scattered foci of susceptibility artifact are seen within the bilateral   basal ganglia, compatible with areas of chronic microhemorrhage.    Multiple additional patchy confluent nonspecific T2/FLAIR hyperintense   signal changes are noted throughout the bihemispheric white matter   without associated mass effect or restricted diffusion.    Ventricular size and configuration is unremarkable. No abnormal   extra-axial fluid collections are seen. Flow-voids are noted throughout   the major intracranial vessels, on the T2 weighted images, consistent   with their patency. The sellar location appears unremarkable. There is an   unchanged appearing small retrocerebellar arachnoid cyst versus cisterna   magna.    A polyp versus retention cyst is seen within the right maxillary sinus.   Additional minor scattered mucosal thickening seen throughout the ethmoid   labyrinth. The tympanomastoid cavities are clear. The left orbit appears   within normal limits. There is evidence of right-sided cataract removal.    There is an indeterminate mixed signal ovoid shaped soft tissue focus   involving the left superior pinna measuring 1.4 x 0.9 cm which appears   unchanged.    MRA Neck: Examination is motion limited.    There is a standard anatomic variation to the aortic arch. The origins of   the great vessels appear grossly unremarkable.    The bilateral common carotid arteries, carotid bifurcations and cervical   internal carotid arteries appear patent. The origin of the left vertebral   artery is normal. The left vertebral artery is patent.    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    MRA [place]: Atherosclerosis affects the bilateral carotid   siphons with mild area of focal stenosis involving the right   clinoid/supraclinoid junction. There is mild hypoplasia of the right A1   segment. Otherwise, the bilateral intracranial internal carotid,   anterior, and middle cerebral arteries appear unremarkable.    The anterior and bilateral posterior communicating arteries are seen.    The right V4 segment does not demonstrate flow relatedsignal. The left   V4 segment appears unremarkable. The vertebrobasilar confluence appears   unremarkable. Long segment mild stenosis involving the mid basilar   artery. The basilar tip appears unremarkable as well as the bilateral   posterior cerebral arteries.    IMPRESSION:    MRI BRAIN:  1. Evolving acute/subacute right-sided PICA distribution infarction with   associated cytotoxic edema and very mild hemorrhagic transformation.  2. Additional wedge-shaped area of acute/subacute ischemia within the   left parietal periatrial white matter with associated cytotoxic edema.  3. Indeterminate soft tissue lesion involving the left superior pinna   measuring 1.4 x 0.9 cm. Neoplasm cannot be excluded. Recommend   correlation with direct physical examination and visualization.    MRA NECK:  1. Extremely motion limited study.  2. 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 Recommend further evaluation with a CT angiogram study   of the neck.    MRA HEAD:  1. 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 This can be further evaluated with a CT angiogram study of the   head.  2. Mild focal stenosis of the right supraclinoid intracranial internal   carotid artery secondary to atherosclerosis.  3. Otherwise, no large vessel occlusion or major stenosis.    --- End of Report ---            LAKESHA DIAZ MD; Attending Radiologist  This document has been electronically signed. Oct 30 2023  9:20PM    < end of copied text >

## 2023-11-02 NOTE — CHART NOTE - NSCHARTNOTEFT_GEN_A_CORE
Nutrition Follow Up Note  Patient seen for: nutrition follow up/transfer to CICU.     Chart reviewed, events noted. Pt is a 58M PMH CVA (mild left sided deficits), HTN, DM2, vertigo, HLD, Mobitz II s/pp [employer] ICD (22) initially presented to OSH w/ SOB c/f ADHF vs. PNA, later found to have NSTEMI, transferred to [facility] for LHC. s/p LHC 10/16 w/ triple vessel disease, RHC on 10/19 for hemodynamic assessment, cMR w/ limited viability in LAD territory and TTE 10/28 w/ EF<20% and apical thrombus. Admitted to CICU for hypotension, inotropic support, hemodynamic monitoring. Previously downgraded on 10/22, RRT called while on the floor for AMS and hypotensive to 80/40, CTH 10/27 found w/ R cerebellar infarct, patient re-transferred to CICU for inotropic support.    Source: [x] Patient       [x] Medical Record        [x] RN        [] Family at bedside       [] Other:    -If unable to interview patient: [] Trach/Vent/BiPAP  [] Disoriented/confused/inappropriate to interview    Diet Order:   Diet, Regular:   Consistent Carbohydrate {No Snacks} (CSTCHO)  DASH/TLC {Sodium & Cholesterol Restricted} (DASH)  [place] (10-13-23)    Is current diet order appropriate/adequate? See recommendation below.    PO intake :   [] >75%  Adequate    [x] 50-75%  Fair       [] <50%  Poor   - Pt eating breakfast at time of visit, consumed 100% of eggs and ~50% breakfast potatoes, waiting on oatmeal. States appetite is normal    Nutrition-related concerns:  - Transferred to CICU for inotropic support; HF team following  - Pt ~A&Ox2; having difficulty with word recall/speech. Neuro/psych following.  - HbA1c 8.4%, ordered for Lantus and sliding scale insulin for BG management.    GI:  Last BM .   Bowel Regimen? [x] Yes   [] No    Weights:   Daily Weight in k.8 (-), Weight in k.2 (-), Weight in k (10-30), Weight in k.5 (10-), Weight in k.2 (10-27), Weight in k (10-26)   - weight fluctuations noted, likely secondary to fluid shifts associated with HF/diuresis, will monitor/trend as able    Drug Dosing Weight  Height (cm): 185.4 (21 Oct 2023 12:00)  Weight (kg): 70 (17 Oct 2023 08:44)  BMI (kg/m2): 20.4 (21 Oct 2023 12:00)  BSA (m2): 1.93 (21 Oct 2023 12:00)    MEDICATIONS  (STANDING):  atorvastatin  buMETAnide  digoxin     Tablet  hydrALAZINE  insulin glargine Injectable (LANTUS)  insulin lispro (ADMELOG) corrective regimen sliding scale  insulin lispro (ADMELOG) corrective regimen sliding scale  insulin lispro Injectable (ADMELOG)  isosorbide   dinitrate Tablet (ISORDIL)  pantoprazole    Tablet  senna    Pertinent Labs:  @ 00:55: Na 133<L>, BUN 48<H>, Cr 2.26<H>, <H>, K+ 5.1, Phos 5.6<H>, Mg 2.3, Alk Phos 115, ALT/SGPT 42, AST/SGOT 21, HbA1c --   @ 12:56: Na 133<L>, BUN 45<H>, Cr 2.17<H>, <H>, K+ 4.9, Phos 5.1<H>, Mg 2.4, Alk Phos 99, ALT/SGPT 37, AST/SGOT 19, HbA1c --    A1C with Estimated Average Glucose Result: 8.4 % (10-14-23 @ 06:18)    Finger Sticks:  POCT Blood Glucose.: 272 mg/dL ( @ 20:53)  POCT Blood Glucose.: 311 mg/dL ( @ 18:38)  POCT Blood Glucose.: 200 mg/dL ( @ 12:46)  POCT Blood Glucose.: 236 mg/dL ( @ 08:47)    Triglycerides, Serum: 80 mg/dL (10-17-23 @ 08:16)    Skin per nursing documentation: No noted pressure injuries as per documentation.   Edema: No noted edema as per flow sheets.     Based on 10/24 wt of 66.5kg  Estimated Energy Needs: 1995kcal (30 - 35kcal/kg)  Estimated Protein Needs: 80 - 100g (1.2 - 1.5g/kg)  Estimated Fluid Needs: per team  [place] Equation: N/A    Previous Nutrition Diagnosis: Altered Nutrition Related Lab Values   Nutrition Diagnosis is: [x] ongoing  [] resolved [] not applicable     New Nutrition Diagnosis: Increased Nutrient Needs related to increased physiological demand for nutrients as evidenced by pt with CHF.  Goal: Pt to meet >80% of estimated nutritional needs during hospital stay.     Nutrition Care Plan:  [x] In Progress  [] Achieved  [] Not applicable    Nutrition Interventions:     Education Provided:       [x] Yes: Discussed importance of adequate protein/energy intake, reviewed protein sources. Food preferences obtained, RD to honor as able.     Recommendations:  1. Continue consistent carbohydrate, DASH/TLC, Halal diet (per pt preference).  2. RD to add diet Mighty Shakes BID to promote PO intake.  3. Provide encouragement with PO intake, menu selections, and assistance with meals as needed.   4. Reinforce nutrition education as needed - RD remains available.     Monitoring and Evaluation:   Continue to monitor nutritional intake, tolerance to diet prescription, weights, labs, skin integrity    RD remains available upon request and will follow up per protocol    Kaya Mai MS, RD, CDN, CNSC; available via MS Teams

## 2023-11-02 NOTE — PROGRESS NOTE ADULT - PROBLEM SELECTOR PLAN 1
#HFrEF  - Etiology: ICM  - Last TTE: EF <20% rMWA  - Cath: : 95% discrete proximal LAD disease and sequential multifocal disease with good distal target, 80-90% proximal LCx disease just prior to marginals, severe multifocal RCA disease with good targets   - GDMT:    > BB: Would start low dose Lopressor 12.5mg TID this afternoon    > ACE/ARB/ARNI: Stopped Entresto 24-26 BID due to low flow and elevated Cr. Increase Isordil to 20mg TID and continue hydralazine 50mg TID with uptitration Qdose as tolerated    > MRA: Stopped spironolactone 25 QDay due to low flow and elevated Cr     > SGLT2 I: Stopped Farxiga 10 QDay   - Inotropes: stop milrinone. Trend perfusion labs, mVO2  - Diuretics: Currently on Bumex 2mg IVP QD and PRN for goal net negative at least 2L, CVP goal ~8; Would give additional Bumex 1mg IVP today.   - Advanced therapies: severe LV dysfunction with tachycardia and poor non-invasive hemodynamics concerning, currently holding off on launching VAD/transplant eval given cognitive issues however will continue to reassess. Of note he has good support and no clear psychosocial red flags. ABO AB. Appreciate neuro and behavioral psych input.   - F/U PT recs  - please check post void residual bladder scan

## 2023-11-02 NOTE — PROGRESS NOTE ADULT - ASSESSMENT
====================ASSESSMENT ==============  59 yo Male pmhx CVA with mild left sided deficits, HTN, DM2, vertigo, HLD, Mobitz II s/pp Medtronic dual chamber ICD (12/21/22) initially presented to OSH for SOB c/f ADHF vs. PNA, later found to have NSTEMI transferred to [facility] for C evaluation. 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 Admitted to CICU for hypotension, inotropic support, hemodynamic monitoring. Previously downgraded on 10/22, RRT called while on the floor for AMS and hypotensive to 80/40, CTH 10/27 found possible R cerebellar infarct, patient re-transferred to CICU for inotropic support.    NEUROLOGICAL  #L Parietal and R PICA infarct/ embolic shower  #Encephalopathy   #Hx of CVA w/ mild L residual deficit  - currently A&Ox2-3  - MR head 10/30 w/ R PICA infarct and L parietal infarct, likely due to embolic shower, per neuro. Very mild hemorrhagic transformation in R PICA distribution   - MR neck showing occluded R-sided intracranial vertebral artery of unknown timeframe and mild focal stenosis of the R supraclinoid intracranial internal   carotid artery secondary to atherosclerosis  - per neuro, c/w heparin gtt as repeat CT head w/ no hemorrhage  - CT head 10/27 w/ atrophy and small vessel white matter ischemic changes  - neurology following  - B12, TSH, RPR wnl  - neuro checks    CARDIOVASCULAR  #Hypotension/cardiogenic shock, iso acute on chronic HFrEF  TTE 10/16: EF 20%, severely reduced LVSF  LHC 10/16: 95% pLAD, 80% mid and distal LCx, 90% mid and distal RCA  RHC 10/19: RA 10, PCWP 20, CO 4.4, CI 2.3  cMR 10/18: limited viability in LAD territory  TTE 10/26: EF <20 %, LV apical thrombus is seen. Small pericardial effusion noted adjacent to the right ventricle with no evidence of hemodynamic compromise.  -R groin swan placed 10/30: RA 14/13/12, RV 51/14, PA 47/34/39, PCW 34/36/32, CI 2.5/CO 4.95   - RRT called for 80/40 while on the floor, milrinone 0.125 restarted on 10/28  - trend lactate, proBNP  - c/w bumex 2 mg PO qd  -s/p bumex 2 ON  - presently net positive, consider further bumex as BP and Cr allows  - pending HF recs regarding high risk PCI vs further intervention; EP recommended no indication for upgrade at this time. HF holding off on launching VAD/transplant eval  - hold Entresto i/s/o hypotension  -c/w hydral 25 tid  - s/p dig load, dig level 1.4, acceptable per HF   - f/u device interrogation  - avoid BB/CCB given borderline cardiac function  - start GDMT when appropriate    #Triple vessel disease  [facility] 10/16: 95% pLAD, 80% mid and distal LCx, 90% mid and distal RCA  -per CT surg, pt is not a good candidate for CABG given lack of viability in the LAD territory  -pending HF AT eval    #LV apical thrombus  -c/w heparin gtt  -MRI brain and MRA H/N showing 2 infarcts  -neuro checks  -not a candidate for impella assisted PCI    #Tachycardia  Mobitz II s/p AICD  - Sinus tach vs. atach vs. aflutter. Iso increased cardiac demand/ hypoxia vs. iso infxn?  - per EP, appears sinus tach, no indication for cardioversion/EMILEE  - s/p dig load, dig level 1.4, acceptable per HF   - per EP, no need to upgrade device at this time    #HLD  - c/w Atorv 80mg     RESPIRATORY  #Pulmonary edema  Likely iso CHF vs. superimposed PNA  CT chest 10/28: scattered patchy and nodular bilateral upper lobe predominant groundglass opacities with interlobular septal thickening representing pulmonary edema  -c/w Bumex 2 qd   -infectious cause not excluded, low threshold to add abx if leukocytosis or febrile, as pt persistently tachy  -f/u noncontrast CT chest in 1-3 month to ensure resolution    GI/NUTRITION  No active issues  - CC DASH Halal diet  - GI ppx: PPI 40mg daily  - c/w Bowel regimen    /RENAL  #LAURA:   - sCr on admission 1.28, stable today although remains elevated, ddx: iso congestion vs. prerenal from diuresis  -consider urine lytes  -Trend sCr  -Monitor I/O  -c/w diuresis    #Elevated lactate - Improving  - uptrending lactate likely iso cardiogenic shock  - now at normal range  - c/w Milrinone, wean as tolerated  - continue to trend    #BPH  - c/w Flomax 0.4mg qhs    SKIN  - no active issues    INFECTIOUS DISEASE  - No active issues  - plan for pulmonary edema as above  - Recent admission to OSH for ?PNA s/p 14 days of cefepime (last dose 10/14)  - Currently no signs of infection, WBC normal, afebrile, although monitor iso persistent tachycardia    ENDOCRINE    #DM2  - A1c 10/14 8.4%  - c/w Lantus 26u qhs + lispro 10u premeal + ISS  - trend FS, currently at goal    #Thyroid nodule  CT chest: 3. 3 x 1.8 cm left supraclavicular nodule likely arising from the thyroid gland   -thyroid US outpatient    HEMATOLOGIC/DVT PPX  - No active issues  - DVT ppx: heparin gtt    #ETHICS  Full code ====================ASSESSMENT ==============  59 yo Male pmhx CVA with mild left sided deficits, HTN, DM2, vertigo, HLD, Mobitz II s/pp Medtronic dual chamber ICD (12/21/22) initially presented to OSH for SOB c/f ADHF vs. PNA, later found to have NSTEMI transferred to [facility] for C evaluation. 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 Admitted to CICU for hypotension, inotropic support, hemodynamic monitoring. Previously downgraded on 10/22, RRT called while on the floor for AMS and hypotensive to 80/40, CTH 10/27 found possible R cerebellar infarct, patient re-transferred to CICU for inotropic support.    NEUROLOGICAL  #L Parietal and R PICA infarct/ embolic shower  #Encephalopathy   #Hx of CVA w/ mild L residual deficit  - currently A&Ox2-3  - MR head 10/30 w/ R PICA infarct and L parietal infarct, likely due to embolic shower, per neuro. Very mild hemorrhagic transformation in R PICA distribution   - MR neck showing occluded R-sided intracranial vertebral artery of unknown timeframe and mild focal stenosis of the R supraclinoid intracranial internal   carotid artery secondary to atherosclerosis  - per neuro, c/w heparin gtt as repeat CT head w/ no hemorrhage  - CT head 10/27 w/ atrophy and small vessel white matter ischemic changes  - neurology following  - B12, TSH, RPR wnl  - neuro checks    CARDIOVASCULAR  #Hypotension/cardiogenic shock, iso acute on chronic HFrEF  TTE 10/16: EF 20%, severely reduced LVSF  LHC 10/16: 95% pLAD, 80% mid and distal LCx, 90% mid and distal RCA  RHC 10/19: RA 10, PCWP 20, CO 4.4, CI 2.3  cMR 10/18: limited viability in LAD territory  TTE 10/26: EF <20 %, LV apical thrombus is seen. Small pericardial effusion noted adjacent to the right ventricle with no evidence of hemodynamic compromise.  -R groin swan placed 10/30: RA 14/13/12, RV 51/14, PA 47/34/39, PCW 34/36/32, CI 2.5/CO 4.95   - RRT called for 80/40 while on the floor, milrinone 0.125 restarted on 10/28, weaned off 11/1  - trend lactate, proBNP  - c/w bumex 2 mg PO qd  -s/p bumex 1 this AM  - presently net positive, consider further bumex as BP and Cr allows  - pending HF recs regarding high risk PCI vs further intervention; EP recommended no indication for upgrade at this time. HF holding off on launching VAD/transplant eval  - hold Entresto i/s/o hypotension  - hydral increased to 50 tid  - s/p dig load, dig level 1.4, acceptable per HF   - f/u device interrogation  - avoid BB/CCB given borderline cardiac function  - start GDMT when appropriate, start metoprolol and farxiga tomorrow    #Triple vessel disease  [facility] 10/16: 95% pLAD, 80% mid and distal LCx, 90% mid and distal RCA  -per CT surg, pt is not a good candidate for CABG given lack of viability in the LAD territory  -pending HF AT eval    #LV apical thrombus  -c/w heparin gtt  -MRI brain and MRA H/N showing 2 infarcts  -neuro checks  -not a candidate for impella assisted PCI    #Tachycardia  Mobitz II s/p AICD  - Sinus tach vs. atach vs. aflutter. Iso increased cardiac demand/ hypoxia vs. iso infxn?  - per EP, appears sinus tach, no indication for cardioversion/EMILEE  - s/p dig load, dig level 1.4, acceptable per HF   - per EP, no need to upgrade device at this time    #HLD  - c/w Atorv 80mg     RESPIRATORY  #Pulmonary edema  Likely iso CHF vs. superimposed PNA  CT chest 10/28: scattered patchy and nodular bilateral upper lobe predominant groundglass opacities with interlobular septal thickening representing pulmonary edema  -c/w Bumex 2 qd   -infectious cause not excluded, low threshold to add abx if leukocytosis or febrile, as pt persistently tachy  -f/u noncontrast CT chest in 1-3 month to ensure resolution    GI/NUTRITION  No active issues  - CC DASH Halal diet  - GI ppx: PPI 40mg daily  - c/w Bowel regimen    /RENAL  #LAURA:   - sCr on admission 1.28, stable today although remains elevated, ddx: iso congestion vs. prerenal from diuresis  -consider urine lytes  -Trend sCr  -Monitor I/O  -c/w diuresis    #Elevated lactate - Improving  - uptrending lactate likely iso cardiogenic shock  - now at normal range  - c/w Milrinone, wean as tolerated  - continue to trend    #BPH  - c/w Flomax 0.4mg qhs    SKIN  - no active issues    INFECTIOUS DISEASE  - No active issues  - plan for pulmonary edema as above  - Recent admission to OSH for ?PNA s/p 14 days of cefepime (last dose 10/14)  - Currently no signs of infection, WBC normal, afebrile, although monitor iso persistent tachycardia    ENDOCRINE    #DM2  - A1c 10/14 8.4%  - increased Lantus 28u qhs + lispro 12u premeal + ISS  - trend FS, today were elevated    #Thyroid nodule  CT chest: 3. 3 x 1.8 cm left supraclavicular nodule likely arising from the thyroid gland   -thyroid US outpatient    HEMATOLOGIC/DVT PPX  - No active issues  - DVT ppx: heparin gtt    #ETHICS  Full code

## 2023-11-02 NOTE — PROGRESS NOTE ADULT - SUBJECTIVE AND OBJECTIVE BOX
Patient is a 58y old  Male who presents with a chief complaint of NSTEMI (28 Oct 2023 19:24)    HPI:  Patient with separate chart from [facility], MRN# [id number]    57 yo Male pmhx CVA with mild left sided deficits, HTN, DM2, vertigo, HLD, Mobitz II s/pp Medtronic dual chamber ICD (22) initially presented to [facility] from home with complaints of dyspnea and chest pain and was admitted w/ acute hypoxic respiratory failure likely due to acute pulmonary edema due to acute HFrEF in setting of acute NSTEMI, LAURA and enterovirus infection. Pt with brief MICU stay at [facility] requiring bipap (no intubation), was treated for PNA with cefepime, and was found to have TTE done 23 showing EF 20% with severely decreased LVSF, multiple regional wall motion abnormalities, and elevated LV end diastolic pressure. Pt was transferred to [facility] for cardiac cath evaluation. Patient without any acute complaints, complaining of intermittent palpitations for the last 2 days. No chest pain, SOB, fevers, nausea, vomiting, abdominal pain.  (13 Oct 2023 21:05)       INTERVAL HPI/OVERNIGHT EVENTS:   ON, pt received hydral 10 x2, additional 2 IV bumex with net positive UO. Hydral 25 tid was also added.    Patient seen and examined at bedside. Pt stated he is feeling well. A&Ox2-3 with a lot of prompting (knows name, knows in hospital, thought it was November, did not know year, knew who the president is).  No acute complaints, denied headache/ cp/sob/dizziness/palpitations/paresthesia/weakness.    ICU Vital Signs Last 24 Hrs  T(C): 37 (2023 07:00), Max: 37.1 (31 Oct 2023 20:00)  T(F): 98.6 (2023 07:00), Max: 98.7 (31 Oct 2023 20:00)  HR: 105 (2023 08:00) (105 - 121)  BP: --  BP(mean): --  ABP: 126/66 (2023 08:00) (94/47 - 136/68)  ABP(mean): 84 (2023 08:00) (63 - 87)  RR: 18 (2023 08:00) (12 - 23)  SpO2: 91% (2023 08:00) (91% - 100%)    O2 Parameters below as of 2023 08:00  Patient On (Oxygen Delivery Method): room air    I&O's Summary    30 Oct 2023 07:01  -  31 Oct 2023 07:00  --------------------------------------------------------  IN: 1587.3 mL / OUT: 1400 mL / NET: 187.3 mL      Daily     Daily Weight in k.5 (28 Oct 2023 12:15)      EKG/Telemetry Events:    MEDICATIONS  (STANDING):  aspirin enteric coated 81 milliGRAM(s) Oral daily  atorvastatin 80 milliGRAM(s) Oral at bedtime  buMETAnide 2 milliGRAM(s) Oral daily  chlorhexidine 2% Cloths 1 Application(s) Topical daily  dextrose 5%. 1000 milliLiter(s) (50 mL/Hr) IV Continuous <Continuous>  dextrose 5%. 1000 milliLiter(s) (100 mL/Hr) IV Continuous <Continuous>  digoxin     Tablet 125 MICROGram(s) Oral daily  heparin  Infusion 1200 Unit(s)/Hr (12 mL/Hr) IV Continuous <Continuous>  insulin glargine Injectable (LANTUS) 24 Unit(s) SubCutaneous at bedtime  insulin lispro (ADMELOG) corrective regimen sliding scale   SubCutaneous three times a day before meals  insulin lispro (ADMELOG) corrective regimen sliding scale   SubCutaneous at bedtime  insulin lispro Injectable (ADMELOG) 8 Unit(s) SubCutaneous three times a day before meals  milrinone Infusion 0.125 MICROgram(s)/kG/Min (2.63 mL/Hr) IV Continuous <Continuous>  pantoprazole    Tablet 40 milliGRAM(s) Oral before breakfast  senna 2 Tablet(s) Oral at bedtime  tamsulosin 0.4 milliGRAM(s) Oral at bedtime    MEDICATIONS  (PRN):  benzocaine/menthol Lozenge 1 Lozenge Oral every 2 hours PRN Sore Throat  polyethylene glycol 3350 17 Gram(s) Oral daily PRN Constipation      PHYSICAL EXAM:  GENERAL: AAOx2-3 NAD  HEAD:  Atraumatic, Normocephalic  EYES: PERRLA, conjunctiva and sclera clear  NECK: Supple, No JVD, Normal thyroid, no enlarged nodes  NERVOUS SYSTEM: Strength 5/5 throughout (unable to assess RLE strength due to swan), sensation present throughout, CN intact, pt w/ difficulty expressing words almost like Broca's aphasia  CHEST/LUNG: coarse breath sounds on expiration LLL; No rales, rhonchi, or wheezing  HEART: S1S2 normal, Regular rate and rhythm; No murmurs appreciated  ABDOMEN: Soft, Nontender, Nondistended; Bowel sounds present  EXTREMITIES:  2+ Peripheral Pulses, No clubbing, cyanosis, or edema  LYMPH: No lymphadenopathy noted  SKIN: No rashes or lesions    LABS:             CBC Full  -  ( 2023 01:38 )  WBC Count : 9.45 K/uL  RBC Count : 4.14 M/uL  Hemoglobin : 10.5 g/dL  Hematocrit : 33.9 %  Platelet Count - Automated : 202 K/uL  Mean Cell Volume : 81.9 fl  Mean Cell Hemoglobin : 25.4 pg  Mean Cell Hemoglobin Concentration : 31.0 gm/dL  Auto Neutrophil # : x  Auto Lymphocyte # : x  Auto Monocyte # : x  Auto Eosinophil # : x  Auto Basophil # : x  Auto Neutrophil % : x  Auto Lymphocyte % : x  Auto Monocyte % : x  Auto Eosinophil % : x  Auto Basophil % : x        132<L>  |  98  |  42<H>  ----------------------------<  232<H>  4.7   |  18<L>  |  2.15<H>    Ca    9.1      2023 01:38  Phos  5.2       Mg     1.9         TPro  6.5  /  Alb  3.0<L>  /  TBili  0.3  /  DBili  x   /  AST  30  /  ALT  40  /  AlkPhos  107         PTT - ( 29 Oct 2023 00:03 )  PTT:56.0 sec  CAPILLARY BLOOD GLUCOSE      POCT Blood Glucose.: 200 mg/dL (28 Oct 2023 21:15)  POCT Blood Glucose.: 324 mg/dL (28 Oct 2023 17:00)  POCT Blood Glucose.: 162 mg/dL (28 Oct 2023 11:53)            Urinalysis Basic - ( 29 Oct 2023 05:42 )    Color: x / Appearance: x / SG: x / pH: x  Gluc: 216 mg/dL / Ketone: x  / Bili: x / Urobili: x   Blood: x / Protein: x / Nitrite: x   Leuk Esterase: x / RBC: x / WBC x   Sq Epi: x / Non Sq Epi: x / Bacteria: x          RADIOLOGY & ADDITIONAL TESTS:    < from: MR Angio Neck No Cont (10.30.23 @ 20:59) >  IMPRESSION:    MRI BRAIN:  1. Evolving acute/subacute right-sided PICA distribution infarction with   associated cytotoxic edema and very mild hemorrhagic transformation.  2. Additional wedge-shaped area of acute/subacute ischemia within the   left parietal periatrial white matter with associated cytotoxic edema.  3. Indeterminate soft tissue lesion involving the left superior pinna   measuring 1.4 x 0.9 cm. Neoplasm cannot be excluded. Recommend   correlation with direct physical examination and visualization.    MRA NECK:  1. Extremely motion limited study.  2. 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 Recommend further evaluation with a CT angiogram study   of the neck.    MRA HEAD:  1. 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 This can be further evaluated with a CT angiogram study of the   head.  2. Mild focal stenosis of the right supraclinoid intracranial internal   carotid artery secondary to atherosclerosis.  3. Otherwise, no large vessel occlusion or major stenosis.    --- End of Report ---    < end of copied text >      Care Discussed with Consultants/Other Providers [ x] YES  [ ] NO           Patient is a 58y old  Male who presents with a chief complaint of NSTEMI (28 Oct 2023 19:24)    HPI:  Patient with separate chart from [facility], MRN# [id number]    57 yo Male pmhx CVA with mild left sided deficits, HTN, DM2, vertigo, HLD, Mobitz II s/pp Medtronic dual chamber ICD (22) initially presented to [facility] from home with complaints of dyspnea and chest pain and was admitted w/ acute hypoxic respiratory failure likely due to acute pulmonary edema due to acute HFrEF in setting of acute NSTEMI, LAURA and enterovirus infection. Pt with brief MICU stay at [facility] requiring bipap (no intubation), was treated for PNA with cefepime, and was found to have TTE done 23 showing EF 20% with severely decreased LVSF, multiple regional wall motion abnormalities, and elevated LV end diastolic pressure. Pt was transferred to [facility] for cardiac cath evaluation. Patient without any acute complaints, complaining of intermittent palpitations for the last 2 days. No chest pain, SOB, fevers, nausea, vomiting, abdominal pain.  (13 Oct 2023 21:05)       INTERVAL HPI/OVERNIGHT EVENTS:   ON, pt had CVP of 16, received bumex 2, isordil increased 20 and hydral increased to 35 tid. CVP was 9 this AM and swan was removed.     Patient seen and examined at bedside. Pt stated he is feeling short of breath. A&Ox2-3 with a lot of prompting (knows name, knows in [facility], did not know date, knew who the president is).  Denied headache/ cp/dizziness/palpitations/paresthesia/weakness.    ICU Vital Signs Last 24 Hrs  T(C): 37 (2023 03:00), Max: 37.4 (2023 19:00)  T(F): 98.6 (2023 03:00), Max: 99.3 (2023 19:00)  HR: 108 (2023 10:00) (103 - 118)  BP: --  BP(mean): --  ABP: 115/63 (2023 10:00) (101/57 - 148/78)  ABP(mean): 78 (2023 10:00) (72 - 99)  RR: 24 (2023 10:00) (19 - 49)  SpO2: 99% (2023 10:00) (92% - 99%)    O2 Parameters below as of 2023 10:00  Patient On (Oxygen Delivery Method): nasal cannula  O2 Flow (L/min): 3    I&O's Summary    2023 07:01  -  2023 07:00  --------------------------------------------------------  IN: 805.8 mL / OUT: 875 mL / NET: -69.2 mL    2023 07:01  -  2023 12:58  --------------------------------------------------------  IN: 36 mL / OUT: 0 mL / NET: 36 mL      Daily     Daily Weight in k.5 (28 Oct 2023 12:15)      EKG/Telemetry Events:    MEDICATIONS  (STANDING):  aspirin enteric coated 81 milliGRAM(s) Oral daily  atorvastatin 80 milliGRAM(s) Oral at bedtime  buMETAnide 2 milliGRAM(s) Oral daily  chlorhexidine 2% Cloths 1 Application(s) Topical daily  dextrose 5%. 1000 milliLiter(s) (50 mL/Hr) IV Continuous <Continuous>  dextrose 5%. 1000 milliLiter(s) (100 mL/Hr) IV Continuous <Continuous>  digoxin     Tablet 125 MICROGram(s) Oral daily  heparin  Infusion 1200 Unit(s)/Hr (12 mL/Hr) IV Continuous <Continuous>  insulin glargine Injectable (LANTUS) 24 Unit(s) SubCutaneous at bedtime  insulin lispro (ADMELOG) corrective regimen sliding scale   SubCutaneous three times a day before meals  insulin lispro (ADMELOG) corrective regimen sliding scale   SubCutaneous at bedtime  insulin lispro Injectable (ADMELOG) 8 Unit(s) SubCutaneous three times a day before meals  milrinone Infusion 0.125 MICROgram(s)/kG/Min (2.63 mL/Hr) IV Continuous <Continuous>  pantoprazole    Tablet 40 milliGRAM(s) Oral before breakfast  senna 2 Tablet(s) Oral at bedtime  tamsulosin 0.4 milliGRAM(s) Oral at bedtime    MEDICATIONS  (PRN):  benzocaine/menthol Lozenge 1 Lozenge Oral every 2 hours PRN Sore Throat  polyethylene glycol 3350 17 Gram(s) Oral daily PRN Constipation      PHYSICAL EXAM:  GENERAL: AAOx2-3 NAD  HEAD:  Atraumatic, Normocephalic  EYES: PERRLA, conjunctiva and sclera clear  NECK: Supple, No JVD, Normal thyroid, no enlarged nodes  NERVOUS SYSTEM: Strength 5/5 throughout (unable to assess RLE strength due to swan), sensation present throughout, CN intact, pt w/ difficulty expressing words almost like Broca's aphasia  CHEST/LUNG: coarse breath sounds on expiration LLL; No rales, rhonchi, or wheezing  HEART: S1S2 normal, Regular rate and rhythm; No murmurs appreciated  ABDOMEN: Soft, Nontender, Nondistended; Bowel sounds present  EXTREMITIES:  2+ Peripheral Pulses, No clubbing, cyanosis, or edema  LYMPH: No lymphadenopathy noted  SKIN: No rashes or lesions    LABS:             CBC Full  -  ( 2023 00:55 )  WBC Count : 10.78 K/uL  RBC Count : 4.06 M/uL  Hemoglobin : 10.3 g/dL  Hematocrit : 32.8 %  Platelet Count - Automated : 194 K/uL  Mean Cell Volume : 80.8 fl  Mean Cell Hemoglobin : 25.4 pg  Mean Cell Hemoglobin Concentration : 31.4 gm/dL  Auto Neutrophil # : x  Auto Lymphocyte # : x  Auto Monocyte # : x  Auto Eosinophil # : x  Auto Basophil # : x  Auto Neutrophil % : x  Auto Lymphocyte % : x  Auto Monocyte % : x  Auto Eosinophil % : x  Auto Basophil % : x        133<L>  |  100  |  48<H>  ----------------------------<  224<H>  5.1   |  20<L>  |  2.26<H>    Ca    9.0      2023 00:55  Phos  5.6     -  Mg     2.3         TPro  6.2  /  Alb  3.3  /  TBili  0.3  /  DBili  x   /  AST  21  /  ALT  42  /  AlkPhos  115  11-02       PTT - ( 29 Oct 2023 00:03 )  PTT:56.0 sec  CAPILLARY BLOOD GLUCOSE      POCT Blood Glucose.: 200 mg/dL (28 Oct 2023 21:15)  POCT Blood Glucose.: 324 mg/dL (28 Oct 2023 17:00)  POCT Blood Glucose.: 162 mg/dL (28 Oct 2023 11:53)            Urinalysis Basic - ( 29 Oct 2023 05:42 )    Color: x / Appearance: x / SG: x / pH: x  Gluc: 216 mg/dL / Ketone: x  / Bili: x / Urobili: x   Blood: x / Protein: x / Nitrite: x   Leuk Esterase: x / RBC: x / WBC x   Sq Epi: x / Non Sq Epi: x / Bacteria: x          RADIOLOGY & ADDITIONAL TESTS:    < from: MR Angio Neck No Cont (10.30.23 @ 20:59) >  IMPRESSION:    MRI BRAIN:  1. Evolving acute/subacute right-sided PICA distribution infarction with   associated cytotoxic edema and very mild hemorrhagic transformation.  2. Additional wedge-shaped area of acute/subacute ischemia within the   left parietal periatrial white matter with associated cytotoxic edema.  3. Indeterminate soft tissue lesion involving the left superior pinna   measuring 1.4 x 0.9 cm. Neoplasm cannot be excluded. Recommend   correlation with direct physical examination and visualization.    MRA NECK:  1. Extremely motion limited study.  2. 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 Recommend further evaluation with a CT angiogram study   of the neck.    MRA HEAD:  1. 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 This can be further evaluated with a CT angiogram study of the   head.  2. Mild focal stenosis of the right supraclinoid intracranial internal   carotid artery secondary to atherosclerosis.  3. Otherwise, no large vessel occlusion or major stenosis.    --- End of Report ---    < end of copied text >      Care Discussed with Consultants/Other Providers [ x] YES  [ ] NO

## 2023-11-02 NOTE — PROGRESS NOTE ADULT - ASSESSMENT
59 YO M with a history of CVA with residual mild R paresthesias, second degree Mobitz II heart block s/p DC-ICD 12/2022 (initial concern for sarcoid but negative biopsy/PET post procedure), DM2 (A1c 8.4%), and HTN who was admitted to [facility] with chest pain and dyspnea, found to have NSTEMI with markedly elevated troponins and new severe LV dysfunction with regional wall motion abnormalities as well as + enterovirus. He required NIPPV and was diuresed before being transferred to [facility] where LHC performed which revealed severe 3v CAD with critical proximal LAD involvement. CTS was consulted for CABG evaluation and HF consulted for comanagement.    He ultimately underwent RHC with revealed elevated wedge but normal cardiac output. He was transferred to CICU for swan placement and closer observation of hemodynamics. Found to have LV thrombus. Given concern for low flow status thus sent back to CCU 10/28. He is confused and was found to have R medial cerebellar infarct on head CT, with MRI showing evolving PICA stroke. Given evolving stroke, brain lesion and ongoing confusion he's not currently a candidate for coronary intervention. Additionally, per CTS review, no LAD viability. On reduced dose of milrinone his HR has come down to low 100s from 120s. His Cr remains stable, but elevated from a few days ago. His BP is borderline but with mVO2 of 63%, corresponding to CI 2.4 this morning. His bedside hemodynamics revealed elevated CVP and mild increase in PA pressures with MAPS 71-83 on jennifer. CXR with increased congestion. Which was responsive with aggressive diuresis and afterload reduction agents. However, his PA catheter overnight become displaced to RV and therefore was removed in AM. He has been weaned off  milrinone since 11/1 with stable markers of end organ function today.     REVIEW OF STUDIES  TTE 10/26: EF <20% LVT present, small pericardial effusion w/o tamponade  RHC 10/19: RA 10, RV 47/9/13, Wedge 29, PA 41/26/33, CO/CI 4.4/2.3, PA sat 57.3  EKG: sinus tachycardia, septal q-waves, left axis deviation   TTE 10/16/23: LV 5.5 cm, LVEF 20% with regional WMAs worse in the apex, LVOT VTI 8 cm, normal RV size/function, severe functional MR, small pericardial effusion, estimated RA pressure 8 mmHg   TTE 12/2022: normal LVEF   LHC: 95% discrete proximal LAD disease and sequential multifocal disease with good distal target, 80-90% proximal LCx disease just prior to marginals, severe multifocal RCA disease with good targets     Hemodynamics:  11/1/23 CVP 13, PA 48/23/35, mVO2 62.6%, Cris CI 2.4  10/21/23: RA 13 with V-wave 21, PA 50/33, PCW 33, MvO2 68.2, Cris CO/CI 4.4/2.56

## 2023-11-02 NOTE — PROGRESS NOTE ADULT - ASSESSMENT
59 yo Male with prior Stroke with mild ?left sided deficits (improved), HTN, DM2, vertigo, HLD, Mobitz II s/pp [employer] dual chamber ICD (12/21/22) initially presented to [facility] from home with complaints of dyspnea and chest pain and was admitted w/ acute hypoxic respiratory failure likely due to acute pulmonary edema due to acute HFrEF in setting of acute NSTEMI, LAURA and enterovirus infection. Pt with brief MICU stay at [facility] requiring bipap (no intubation), was treated for PNA with cefepime, and was found to have TTE done 9/26/23 showing EF 20% with severely decreased LVSF, multiple regional wall motion abnormalities, and elevated LV end diastolic pressure. Pt was transferred to [facility] for cardiac eval.   TTE EF < 20%  [facility] 10/19: RA 10, RV 47/9/13, Wedge 29, PA 41/26/33, CO/CI 4.4/2.3, PA sat 57.3  EKG: sinus tachycardia, septal q-waves, left axis deviation   TTE 10/16/23: LV 5.5 cm, LVEF 20% with regional WMAs worse in the apex, LVOT VTI 8 cm, normal RV size/function, severe functional MR, small pericardial effusion, estimated RA pressure 8 mmHg   TTE 12/2022: normal LVEF   LHC: 95% discrete proximal LAD disease and sequential multifocal disease with good distal target, 80-90% proximal LCx disease just prior to marginals, severe multifocal RCA disease with good targets   A1c 8.4    CD 10/17 neg   NIHSS 1  CTH with age indeterminate R cerebellar infarct.  likely chronic   MRi brain with eovlving acute/subacute R PICA infarct with mild edema and mild hemorrhagic transformation. 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  occluded R VA.   CTH stable     Impression:   R cerebellar/PICA infarct as well as L parietal embolic infarct, likely cardioembolic      - c/o HA on and off. no new neuro changes   - CTS eval for CABG given 3 vessel disease vs high risk PCI --> not a CABG candidate   -  in CCU for milrinone pressure support   - c/w asa and statin therpay for secondary stroke prevention.   - no objection to heparin drip for low EF and LV thrombus   - called for risk stratification for heart transplant eval,  low-mod risk and no objection   - b12, TSH, RPR for reversible causes of dementia   - telemetry  - PT/OT   - check FS, glucose control <180  - GI/DVT ppx   - Thank you for allowing me to participate in the care of this patient. Call with questions.   spoke with primary team     Abelardo Irving MD  Vascular Neurology  Office: [phone]

## 2023-11-02 NOTE — PROGRESS NOTE ADULT - ATTENDING COMMENTS
59yo M with CVA DM2 s/p ICD here with shortness of breath, admitted for ADHF vs PNA and LHC on 10/16 showed 3v dz and reduced EF. RHC on 10/19 showed CVP of 10, PCWP 29 and CI of 2.3, started empirically on inotropes - now weaned off    Neuro: stable  Pulm: volume overload - will c/w diuresis to maintain negative fluid status  CV: weaned off of the milrninone on 11/1   tolerating afterload reduction - will continue to optimize  appreciate CTS input - pt is not a candidate for LAD revascularization in light of poor viability in that territory  GI: DASH diet  ID: no abx  Heme: sqh ppx  Endo: BG controlled  Groin swan - will remove today    Patient is critically ill, requiring critical care services. Despite the current condition, the patient is at a high risk of clinical and hemodynamic demise

## 2023-11-03 LAB
ALBUMIN SERPL ELPH-MCNC: 2.9 G/DL — LOW (ref 3.3–5)
ALBUMIN SERPL ELPH-MCNC: 2.9 G/DL — LOW (ref 3.3–5)
ALP SERPL-CCNC: 99 U/L — SIGNIFICANT CHANGE UP (ref 40–120)
ALP SERPL-CCNC: 99 U/L — SIGNIFICANT CHANGE UP (ref 40–120)
ALT FLD-CCNC: 35 U/L — SIGNIFICANT CHANGE UP (ref 10–45)
ALT FLD-CCNC: 35 U/L — SIGNIFICANT CHANGE UP (ref 10–45)
ANION GAP SERPL CALC-SCNC: 13 MMOL/L — SIGNIFICANT CHANGE UP (ref 5–17)
ANION GAP SERPL CALC-SCNC: 13 MMOL/L — SIGNIFICANT CHANGE UP (ref 5–17)
APTT BLD: 50.2 SEC — HIGH (ref 24.5–35.6)
APTT BLD: 50.2 SEC — HIGH (ref 24.5–35.6)
APTT BLD: 59.1 SEC — HIGH (ref 24.5–35.6)
APTT BLD: 59.1 SEC — HIGH (ref 24.5–35.6)
APTT BLD: 83.3 SEC — HIGH (ref 24.5–35.6)
APTT BLD: 83.3 SEC — HIGH (ref 24.5–35.6)
AST SERPL-CCNC: 16 U/L — SIGNIFICANT CHANGE UP (ref 10–40)
AST SERPL-CCNC: 16 U/L — SIGNIFICANT CHANGE UP (ref 10–40)
BILIRUB SERPL-MCNC: 0.4 MG/DL — SIGNIFICANT CHANGE UP (ref 0.2–1.2)
BILIRUB SERPL-MCNC: 0.4 MG/DL — SIGNIFICANT CHANGE UP (ref 0.2–1.2)
BUN SERPL-MCNC: 46 MG/DL — HIGH (ref 7–23)
BUN SERPL-MCNC: 46 MG/DL — HIGH (ref 7–23)
CALCIUM SERPL-MCNC: 9 MG/DL — SIGNIFICANT CHANGE UP (ref 8.4–10.5)
CALCIUM SERPL-MCNC: 9 MG/DL — SIGNIFICANT CHANGE UP (ref 8.4–10.5)
CHLORIDE SERPL-SCNC: 100 MMOL/L — SIGNIFICANT CHANGE UP (ref 96–108)
CHLORIDE SERPL-SCNC: 100 MMOL/L — SIGNIFICANT CHANGE UP (ref 96–108)
CO2 SERPL-SCNC: 19 MMOL/L — LOW (ref 22–31)
CO2 SERPL-SCNC: 19 MMOL/L — LOW (ref 22–31)
CREAT SERPL-MCNC: 2.05 MG/DL — HIGH (ref 0.5–1.3)
CREAT SERPL-MCNC: 2.05 MG/DL — HIGH (ref 0.5–1.3)
DIGOXIN SERPL-MCNC: 1.8 NG/ML — SIGNIFICANT CHANGE UP (ref 0.8–2)
DIGOXIN SERPL-MCNC: 1.8 NG/ML — SIGNIFICANT CHANGE UP (ref 0.8–2)
EGFR: 37 ML/MIN/1.73M2 — LOW
EGFR: 37 ML/MIN/1.73M2 — LOW
GAS PNL BLDA: SIGNIFICANT CHANGE UP
GAS PNL BLDA: SIGNIFICANT CHANGE UP
GLUCOSE BLDC GLUCOMTR-MCNC: 133 MG/DL — HIGH (ref 70–99)
GLUCOSE BLDC GLUCOMTR-MCNC: 133 MG/DL — HIGH (ref 70–99)
GLUCOSE BLDC GLUCOMTR-MCNC: 145 MG/DL — HIGH (ref 70–99)
GLUCOSE BLDC GLUCOMTR-MCNC: 145 MG/DL — HIGH (ref 70–99)
GLUCOSE BLDC GLUCOMTR-MCNC: 198 MG/DL — HIGH (ref 70–99)
GLUCOSE BLDC GLUCOMTR-MCNC: 198 MG/DL — HIGH (ref 70–99)
GLUCOSE BLDC GLUCOMTR-MCNC: 212 MG/DL — HIGH (ref 70–99)
GLUCOSE BLDC GLUCOMTR-MCNC: 212 MG/DL — HIGH (ref 70–99)
GLUCOSE BLDC GLUCOMTR-MCNC: 80 MG/DL — SIGNIFICANT CHANGE UP (ref 70–99)
GLUCOSE BLDC GLUCOMTR-MCNC: 80 MG/DL — SIGNIFICANT CHANGE UP (ref 70–99)
GLUCOSE BLDC GLUCOMTR-MCNC: 89 MG/DL — SIGNIFICANT CHANGE UP (ref 70–99)
GLUCOSE BLDC GLUCOMTR-MCNC: 89 MG/DL — SIGNIFICANT CHANGE UP (ref 70–99)
GLUCOSE SERPL-MCNC: 216 MG/DL — HIGH (ref 70–99)
GLUCOSE SERPL-MCNC: 216 MG/DL — HIGH (ref 70–99)
HCT VFR BLD CALC: 31.7 % — LOW (ref 39–50)
HCT VFR BLD CALC: 31.7 % — LOW (ref 39–50)
HGB BLD-MCNC: 9.9 G/DL — LOW (ref 13–17)
HGB BLD-MCNC: 9.9 G/DL — LOW (ref 13–17)
HGB FLD-MCNC: 10.8 G/DL — LOW (ref 12.6–17.4)
HGB FLD-MCNC: 10.8 G/DL — LOW (ref 12.6–17.4)
INR BLD: 1.17 RATIO — SIGNIFICANT CHANGE UP (ref 0.85–1.18)
INR BLD: 1.17 RATIO — SIGNIFICANT CHANGE UP (ref 0.85–1.18)
MAGNESIUM SERPL-MCNC: 2.1 MG/DL — SIGNIFICANT CHANGE UP (ref 1.6–2.6)
MAGNESIUM SERPL-MCNC: 2.1 MG/DL — SIGNIFICANT CHANGE UP (ref 1.6–2.6)
MCHC RBC-ENTMCNC: 25.5 PG — LOW (ref 27–34)
MCHC RBC-ENTMCNC: 25.5 PG — LOW (ref 27–34)
MCHC RBC-ENTMCNC: 31.2 GM/DL — LOW (ref 32–36)
MCHC RBC-ENTMCNC: 31.2 GM/DL — LOW (ref 32–36)
MCV RBC AUTO: 81.7 FL — SIGNIFICANT CHANGE UP (ref 80–100)
MCV RBC AUTO: 81.7 FL — SIGNIFICANT CHANGE UP (ref 80–100)
NRBC # BLD: 0 /100 WBCS — SIGNIFICANT CHANGE UP (ref 0–0)
NRBC # BLD: 0 /100 WBCS — SIGNIFICANT CHANGE UP (ref 0–0)
NT-PROBNP SERPL-SCNC: HIGH PG/ML (ref 0–300)
NT-PROBNP SERPL-SCNC: HIGH PG/ML (ref 0–300)
OXYHGB MFR BLDMV: 55.8 % — LOW (ref 90–95)
OXYHGB MFR BLDMV: 55.8 % — LOW (ref 90–95)
PHOSPHATE SERPL-MCNC: 5 MG/DL — HIGH (ref 2.5–4.5)
PHOSPHATE SERPL-MCNC: 5 MG/DL — HIGH (ref 2.5–4.5)
PLATELET # BLD AUTO: 183 K/UL — SIGNIFICANT CHANGE UP (ref 150–400)
PLATELET # BLD AUTO: 183 K/UL — SIGNIFICANT CHANGE UP (ref 150–400)
POTASSIUM SERPL-MCNC: 5 MMOL/L — SIGNIFICANT CHANGE UP (ref 3.5–5.3)
POTASSIUM SERPL-MCNC: 5 MMOL/L — SIGNIFICANT CHANGE UP (ref 3.5–5.3)
POTASSIUM SERPL-SCNC: 5 MMOL/L — SIGNIFICANT CHANGE UP (ref 3.5–5.3)
POTASSIUM SERPL-SCNC: 5 MMOL/L — SIGNIFICANT CHANGE UP (ref 3.5–5.3)
PROT SERPL-MCNC: 6.1 G/DL — SIGNIFICANT CHANGE UP (ref 6–8.3)
PROT SERPL-MCNC: 6.1 G/DL — SIGNIFICANT CHANGE UP (ref 6–8.3)
PROTHROM AB SERPL-ACNC: 12.2 SEC — SIGNIFICANT CHANGE UP (ref 9.5–13)
PROTHROM AB SERPL-ACNC: 12.2 SEC — SIGNIFICANT CHANGE UP (ref 9.5–13)
RAPID RVP RESULT: SIGNIFICANT CHANGE UP
RAPID RVP RESULT: SIGNIFICANT CHANGE UP
RBC # BLD: 3.88 M/UL — LOW (ref 4.2–5.8)
RBC # BLD: 3.88 M/UL — LOW (ref 4.2–5.8)
RBC # FLD: 15.9 % — HIGH (ref 10.3–14.5)
RBC # FLD: 15.9 % — HIGH (ref 10.3–14.5)
SAO2 % BLD: 56.5 % — LOW (ref 60–90)
SAO2 % BLD: 56.5 % — LOW (ref 60–90)
SARS-COV-2 RNA SPEC QL NAA+PROBE: SIGNIFICANT CHANGE UP
SARS-COV-2 RNA SPEC QL NAA+PROBE: SIGNIFICANT CHANGE UP
SODIUM SERPL-SCNC: 132 MMOL/L — LOW (ref 135–145)
SODIUM SERPL-SCNC: 132 MMOL/L — LOW (ref 135–145)
WBC # BLD: 9.31 K/UL — SIGNIFICANT CHANGE UP (ref 3.8–10.5)
WBC # BLD: 9.31 K/UL — SIGNIFICANT CHANGE UP (ref 3.8–10.5)
WBC # FLD AUTO: 9.31 K/UL — SIGNIFICANT CHANGE UP (ref 3.8–10.5)
WBC # FLD AUTO: 9.31 K/UL — SIGNIFICANT CHANGE UP (ref 3.8–10.5)

## 2023-11-03 PROCEDURE — 99232 SBSQ HOSP IP/OBS MODERATE 35: CPT | Mod: 25

## 2023-11-03 PROCEDURE — 99233 SBSQ HOSP IP/OBS HIGH 50: CPT

## 2023-11-03 PROCEDURE — 99291 CRITICAL CARE FIRST HOUR: CPT

## 2023-11-03 PROCEDURE — 93010 ELECTROCARDIOGRAM REPORT: CPT

## 2023-11-03 RX ORDER — METOPROLOL TARTRATE 50 MG
12.5 TABLET ORAL EVERY 6 HOURS
Refills: 0 | Status: DISCONTINUED | OUTPATIENT
Start: 2023-11-04 | End: 2023-11-07

## 2023-11-03 RX ORDER — WARFARIN SODIUM 2.5 MG/1
5 TABLET ORAL ONCE
Refills: 0 | Status: COMPLETED | OUTPATIENT
Start: 2023-11-03 | End: 2023-11-03

## 2023-11-03 RX ORDER — METOPROLOL TARTRATE 50 MG
12.5 TABLET ORAL ONCE
Refills: 0 | Status: COMPLETED | OUTPATIENT
Start: 2023-11-03 | End: 2023-11-03

## 2023-11-03 RX ORDER — INSULIN GLARGINE 100 [IU]/ML
30 INJECTION, SOLUTION SUBCUTANEOUS AT BEDTIME
Refills: 0 | Status: DISCONTINUED | OUTPATIENT
Start: 2023-11-03 | End: 2023-11-08

## 2023-11-03 RX ORDER — HEPARIN SODIUM 5000 [USP'U]/ML
1250 INJECTION INTRAVENOUS; SUBCUTANEOUS
Qty: 25000 | Refills: 0 | Status: DISCONTINUED | OUTPATIENT
Start: 2023-11-03 | End: 2023-11-06

## 2023-11-03 RX ORDER — HYDRALAZINE HCL 50 MG
100 TABLET ORAL EVERY 8 HOURS
Refills: 0 | Status: DISCONTINUED | OUTPATIENT
Start: 2023-11-03 | End: 2023-11-05

## 2023-11-03 RX ORDER — BUMETANIDE 0.25 MG/ML
1 INJECTION INTRAMUSCULAR; INTRAVENOUS ONCE
Refills: 0 | Status: COMPLETED | OUTPATIENT
Start: 2023-11-03 | End: 2023-11-03

## 2023-11-03 RX ORDER — ONDANSETRON 8 MG/1
4 TABLET, FILM COATED ORAL ONCE
Refills: 0 | Status: DISCONTINUED | OUTPATIENT
Start: 2023-11-03 | End: 2023-11-08

## 2023-11-03 RX ADMIN — Medication 100 MILLIGRAM(S): at 13:17

## 2023-11-03 RX ADMIN — POLYETHYLENE GLYCOL 3350 17 GRAM(S): 17 POWDER, FOR SOLUTION ORAL at 14:39

## 2023-11-03 RX ADMIN — Medication 12.5 MILLIGRAM(S): at 05:21

## 2023-11-03 RX ADMIN — Medication 50 MILLIGRAM(S): at 08:15

## 2023-11-03 RX ADMIN — Medication 12.5 MILLIGRAM(S): at 17:11

## 2023-11-03 RX ADMIN — TAMSULOSIN HYDROCHLORIDE 0.4 MILLIGRAM(S): 0.4 CAPSULE ORAL at 23:30

## 2023-11-03 RX ADMIN — Medication 12 UNIT(S): at 17:12

## 2023-11-03 RX ADMIN — ISOSORBIDE DINITRATE 20 MILLIGRAM(S): 5 TABLET ORAL at 08:15

## 2023-11-03 RX ADMIN — Medication 2: at 17:12

## 2023-11-03 RX ADMIN — Medication 12 UNIT(S): at 08:17

## 2023-11-03 RX ADMIN — PANTOPRAZOLE SODIUM 40 MILLIGRAM(S): 20 TABLET, DELAYED RELEASE ORAL at 05:21

## 2023-11-03 RX ADMIN — HEPARIN SODIUM 12.5 UNIT(S)/HR: 5000 INJECTION INTRAVENOUS; SUBCUTANEOUS at 07:40

## 2023-11-03 RX ADMIN — WARFARIN SODIUM 5 MILLIGRAM(S): 2.5 TABLET ORAL at 22:00

## 2023-11-03 RX ADMIN — Medication 12 UNIT(S): at 12:00

## 2023-11-03 RX ADMIN — HEPARIN SODIUM 13 UNIT(S)/HR: 5000 INJECTION INTRAVENOUS; SUBCUTANEOUS at 00:50

## 2023-11-03 RX ADMIN — SENNA PLUS 2 TABLET(S): 8.6 TABLET ORAL at 23:30

## 2023-11-03 RX ADMIN — Medication 81 MILLIGRAM(S): at 12:03

## 2023-11-03 RX ADMIN — HEPARIN SODIUM 12.5 UNIT(S)/HR: 5000 INJECTION INTRAVENOUS; SUBCUTANEOUS at 20:15

## 2023-11-03 RX ADMIN — ISOSORBIDE DINITRATE 20 MILLIGRAM(S): 5 TABLET ORAL at 12:03

## 2023-11-03 RX ADMIN — Medication 100 MILLIGRAM(S): at 23:30

## 2023-11-03 RX ADMIN — ATORVASTATIN CALCIUM 80 MILLIGRAM(S): 80 TABLET, FILM COATED ORAL at 23:30

## 2023-11-03 RX ADMIN — Medication 1: at 08:17

## 2023-11-03 RX ADMIN — Medication 125 MICROGRAM(S): at 05:21

## 2023-11-03 RX ADMIN — BUMETANIDE 1 MILLIGRAM(S): 0.25 INJECTION INTRAMUSCULAR; INTRAVENOUS at 12:02

## 2023-11-03 RX ADMIN — BUMETANIDE 2 MILLIGRAM(S): 0.25 INJECTION INTRAMUSCULAR; INTRAVENOUS at 05:21

## 2023-11-03 RX ADMIN — ISOSORBIDE DINITRATE 20 MILLIGRAM(S): 5 TABLET ORAL at 17:12

## 2023-11-03 NOTE — PROGRESS NOTE ADULT - PROBLEM SELECTOR PLAN 2
L Parietal and R PICA infarct with acute encephalopathy secondary to cardio embolic stroke  - MR head 10/30 w/ R PICA infarct and L parietal infarct, likely due to embolic shower, per neuro. Very mild hemorrhagic transformation in R PICA distribution   - MR neck showing occluded R-sided intracranial vertebral artery of unknown timeframe and mild focal stenosis of the R supraclinoid intracranial internal   carotid artery secondary to atherosclerosis  - neurology following  - B12, TSH, RPR wnl  - neuro checks

## 2023-11-03 NOTE — PROGRESS NOTE ADULT - NSPROGADDITIONALINFOA_GEN_ALL_CORE
Incidental findings:  CT chest: 3. 3 x 1.8 cm left supraclavicular nodule likely arising from the thyroid gland   -thyroid US outpatient      55 minutes spent on total encounter. The necessity of the time spent during the encounter on this date of service was due to:     - preparing to see the patient (eg, review of tests, documents)  - performing a medically appropriate examination and/or evaluation  - referring and communicating with other health care professionals  - documenting clinical information in the electronic or other health record  - care coordination. Incidental findings:  CT chest: 3. 3 x 1.8 cm left supraclavicular nodule likely arising from the thyroid gland   -thyroid US outpatient    D/W pts wife at bedside  55 minutes spent on total encounter. The necessity of the time spent during the encounter on this date of service was due to:     - preparing to see the patient (eg, review of tests, documents)  - performing a medically appropriate examination and/or evaluation  - referring and communicating with other health care professionals  - documenting clinical information in the electronic or other health record  - care coordination.

## 2023-11-03 NOTE — PROGRESS NOTE ADULT - ASSESSMENT
59 YO M with a history of CVA with residual mild R paresthesias, second degree Mobitz II heart block s/p DC-ICD 12/2022 (initial concern for sarcoid but negative biopsy/PET post procedure), DM2 (A1c 8.4%), and HTN who was admitted to [facility] with chest pain and dyspnea, found to have NSTEMI with markedly elevated troponins and new severe LV dysfunction with regional wall motion abnormalities as well as + enterovirus. He required NIPPV and was diuresed before being transferred to [facility] where LHC performed which revealed severe 3v CAD with critical proximal LAD involvement. CTS was consulted for CABG evaluation and HF consulted for comanagement.    He ultimately underwent RHC with revealed elevated wedge but normal cardiac output. He was transferred to CICU for swan placement and closer observation of hemodynamics. Found to have LV thrombus. Given concern for low flow status thus sent back to CCU 10/28. He is confused and was found to have R medial cerebellar infarct on head CT, with MRI showing evolving PICA stroke. Given evolving stroke, brain lesion and ongoing confusion he's not currently a candidate for coronary intervention. Additionally, per CTS review, no LAD viability. He's tolerated milrinone being weaned off as of 11/1 with stable end organ function and is tolerating escalation of vasodilators with room for further escalation. His CXR shows ongoing pulmonary vascular congestion. He's volume overloaded on exam with dilated, non collapsable IVC on bedside POCUS. Tachycardia has been improving off milrinone.    REVIEW OF STUDIES  TTE 10/26: EF <20% LVT present, small pericardial effusion w/o tamponade  RHC 10/19: RA 10, RV 47/9/13, Wedge 29, PA 41/26/33, CO/CI 4.4/2.3, PA sat 57.3  EKG: sinus tachycardia, septal q-waves, left axis deviation   TTE 10/16/23: LV 5.5 cm, LVEF 20% with regional WMAs worse in the apex, LVOT VTI 8 cm, normal RV size/function, severe functional MR, small pericardial effusion, estimated RA pressure 8 mmHg   TTE 12/2022: normal LVEF   [facility]: 95% discrete proximal LAD disease and sequential multifocal disease with good distal target, 80-90% proximal LCx disease just prior to marginals, severe multifocal RCA disease with good targets     Hemodynamics:  11/1/23 CVP 13, PA 48/23/35, mVO2 62.6%, Cris CI 2.4  10/21/23: RA 13 with V-wave 21, PA 50/33, PCW 33, MvO2 68.2, Cris CO/CI 4.4/2.56

## 2023-11-03 NOTE — PROGRESS NOTE ADULT - SUBJECTIVE AND OBJECTIVE BOX
Patient is a 58y old  Male who presents with a chief complaint of NSTEMI (28 Oct 2023 19:24)    HPI:  Patient with separate chart from [facility], MRN# [id number]    59 yo Male pmhx CVA with mild left sided deficits, HTN, DM2, vertigo, HLD, Mobitz II s/pp Medtronic dual chamber ICD (22) initially presented to [facility] from home with complaints of dyspnea and chest pain and was admitted w/ acute hypoxic respiratory failure likely due to acute pulmonary edema due to acute HFrEF in setting of acute NSTEMI, LAURA and enterovirus infection. Pt with brief MICU stay at [facility] requiring bipap (no intubation), was treated for PNA with cefepime, and was found to have TTE done 23 showing EF 20% with severely decreased LVSF, multiple regional wall motion abnormalities, and elevated LV end diastolic pressure. Pt was transferred to [facility] for cardiac cath evaluation. Patient without any acute complaints, complaining of intermittent palpitations for the last 2 days. No chest pain, SOB, fevers, nausea, vomiting, abdominal pain.  (13 Oct 2023 21:05)       INTERVAL HPI/OVERNIGHT EVENTS:   ON, pt had CVP of 16, received bumex 2, isordil increased 20 and hydral increased to 35 tid. CVP was 9 this AM and swan was removed.     Patient seen and examined at bedside. Pt stated he is feeling short of breath. A&Ox2-3 with a lot of prompting (knows name, knows in [facility], did not know date, knew who the president is).  Denied headache/ cp/dizziness/palpitations/paresthesia/weakness.    ICU Vital Signs Last 24 Hrs  T(C): 36.6 (2023 03:00), Max: 37.2 (2023 11:00)  T(F): 97.9 (2023 03:00), Max: 99 (2023 11:00)  HR: 105 (2023 06:00) (104 - 117)  BP: --  BP(mean): --  ABP: 119/64 (2023 06:00) (107/53 - 140/76)  ABP(mean): 80 (2023 06:00) (68 - 95)  RR: 25 (2023 06:00) (16 - 47)  SpO2: 95% (2023 06:00) (92% - 99%)    O2 Parameters below as of 2023 03:00  Patient On (Oxygen Delivery Method): nasal cannula  O2 Flow (L/min): 2      I&O's Summary    2023 07:01  -  2023 07:00  --------------------------------------------------------  IN: 805.8 mL / OUT: 875 mL / NET: -69.2 mL    2023 07:01  -  2023 12:58  --------------------------------------------------------  IN: 36 mL / OUT: 0 mL / NET: 36 mL      Daily     Daily Weight in k.5 (28 Oct 2023 12:15)      EKG/Telemetry Events:    MEDICATIONS  (STANDING):  aspirin enteric coated 81 milliGRAM(s) Oral daily  atorvastatin 80 milliGRAM(s) Oral at bedtime  buMETAnide 2 milliGRAM(s) Oral daily  chlorhexidine 2% Cloths 1 Application(s) Topical daily  dextrose 5%. 1000 milliLiter(s) (50 mL/Hr) IV Continuous <Continuous>  dextrose 5%. 1000 milliLiter(s) (100 mL/Hr) IV Continuous <Continuous>  digoxin     Tablet 125 MICROGram(s) Oral daily  heparin  Infusion 1200 Unit(s)/Hr (12 mL/Hr) IV Continuous <Continuous>  insulin glargine Injectable (LANTUS) 24 Unit(s) SubCutaneous at bedtime  insulin lispro (ADMELOG) corrective regimen sliding scale   SubCutaneous three times a day before meals  insulin lispro (ADMELOG) corrective regimen sliding scale   SubCutaneous at bedtime  insulin lispro Injectable (ADMELOG) 8 Unit(s) SubCutaneous three times a day before meals  milrinone Infusion 0.125 MICROgram(s)/kG/Min (2.63 mL/Hr) IV Continuous <Continuous>  pantoprazole    Tablet 40 milliGRAM(s) Oral before breakfast  senna 2 Tablet(s) Oral at bedtime  tamsulosin 0.4 milliGRAM(s) Oral at bedtime    MEDICATIONS  (PRN):  benzocaine/menthol Lozenge 1 Lozenge Oral every 2 hours PRN Sore Throat  polyethylene glycol 3350 17 Gram(s) Oral daily PRN Constipation      PHYSICAL EXAM:  GENERAL: AAOx2-3 NAD  HEAD:  Atraumatic, Normocephalic  EYES: PERRLA, conjunctiva and sclera clear  NECK: Supple, No JVD, Normal thyroid, no enlarged nodes  NERVOUS SYSTEM: Strength 5/5 throughout (unable to assess RLE strength due to swan), sensation present throughout, CN intact, pt w/ difficulty expressing words almost like Broca's aphasia  CHEST/LUNG: coarse breath sounds on expiration LLL; No rales, rhonchi, or wheezing  HEART: S1S2 normal, Regular rate and rhythm; No murmurs appreciated  ABDOMEN: Soft, Nontender, Nondistended; Bowel sounds present  EXTREMITIES:  2+ Peripheral Pulses, No clubbing, cyanosis, or edema  LYMPH: No lymphadenopathy noted  SKIN: No rashes or lesions    LABS:             CBC Full  -  ( 2023 00:55 )  WBC Count : 10.78 K/uL  RBC Count : 4.06 M/uL  Hemoglobin : 10.3 g/dL  Hematocrit : 32.8 %  Platelet Count - Automated : 194 K/uL  Mean Cell Volume : 80.8 fl  Mean Cell Hemoglobin : 25.4 pg  Mean Cell Hemoglobin Concentration : 31.4 gm/dL  Auto Neutrophil # : x  Auto Lymphocyte # : x  Auto Monocyte # : x  Auto Eosinophil # : x  Auto Basophil # : x  Auto Neutrophil % : x  Auto Lymphocyte % : x  Auto Monocyte % : x  Auto Eosinophil % : x  Auto Basophil % : x        133<L>  |  100  |  48<H>  ----------------------------<  224<H>  5.1   |  20<L>  |  2.26<H>    Ca    9.0      2023 00:55  Phos  5.6     -  Mg     2.3         TPro  6.2  /  Alb  3.3  /  TBili  0.3  /  DBili  x   /  AST  21  /  ALT  42  /  AlkPhos  115  11-02       PTT - ( 29 Oct 2023 00:03 )  PTT:56.0 sec  CAPILLARY BLOOD GLUCOSE      POCT Blood Glucose.: 200 mg/dL (28 Oct 2023 21:15)  POCT Blood Glucose.: 324 mg/dL (28 Oct 2023 17:00)  POCT Blood Glucose.: 162 mg/dL (28 Oct 2023 11:53)            Urinalysis Basic - ( 29 Oct 2023 05:42 )    Color: x / Appearance: x / SG: x / pH: x  Gluc: 216 mg/dL / Ketone: x  / Bili: x / Urobili: x   Blood: x / Protein: x / Nitrite: x   Leuk Esterase: x / RBC: x / WBC x   Sq Epi: x / Non Sq Epi: x / Bacteria: x          RADIOLOGY & ADDITIONAL TESTS:    < from: MR Angio Neck No Cont (10.30.23 @ 20:59) >  IMPRESSION:    MRI BRAIN:  1. Evolving acute/subacute right-sided PICA distribution infarction with   associated cytotoxic edema and very mild hemorrhagic transformation.  2. Additional wedge-shaped area of acute/subacute ischemia within the   left parietal periatrial white matter with associated cytotoxic edema.  3. Indeterminate soft tissue lesion involving the left superior pinna   measuring 1.4 x 0.9 cm. Neoplasm cannot be excluded. Recommend   correlation with direct physical examination and visualization.    MRA NECK:  1. Extremely motion limited study.  2. 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 Recommend further evaluation with a CT angiogram study   of the neck.    MRA HEAD:  1. 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 This can be further evaluated with a CT angiogram study of the   head.  2. Mild focal stenosis of the right supraclinoid intracranial internal   carotid artery secondary to atherosclerosis.  3. Otherwise, no large vessel occlusion or major stenosis.    --- End of Report ---    < end of copied text >      Care Discussed with Consultants/Other Providers [ x] YES  [ ] NO           Patient is a 58y old  Male who presents with a chief complaint of NSTEMI (28 Oct 2023 19:24)    HPI:  Patient with separate chart from [facility], MRN# [id number]    57 yo Male pmhx CVA with mild left sided deficits, HTN, DM2, vertigo, HLD, Mobitz II s/pp Medtronic dual chamber ICD (22) initially presented to [facility] from home with complaints of dyspnea and chest pain and was admitted w/ acute hypoxic respiratory failure likely due to acute pulmonary edema due to acute HFrEF in setting of acute NSTEMI, LAURA and enterovirus infection. Pt with brief MICU stay at [facility] requiring bipap (no intubation), was treated for PNA with cefepime, and was found to have TTE done 23 showing EF 20% with severely decreased LVSF, multiple regional wall motion abnormalities, and elevated LV end diastolic pressure. Pt was transferred to [facility] for cardiac cath evaluation. Patient without any acute complaints, complaining of intermittent palpitations for the last 2 days. No chest pain, SOB, fevers, nausea, vomiting, abdominal pain.  (13 Oct 2023 21:05)       INTERVAL HPI/OVERNIGHT EVENTS:   ON, pt w/ soft /60 hydral and isordil spaced out.    Patient seen and examined at bedside. Pt stated he is feeling short of breath. A&Ox2-3 with a lot of prompting (knows name, knows in [facility], did not know date, knew who the president is).  Denied headache/ cp/dizziness/palpitations/paresthesia/weakness.    ICU Vital Signs Last 24 Hrs  T(C): 36.6 (2023 03:00), Max: 37.2 (2023 11:00)  T(F): 97.9 (2023 03:00), Max: 99 (2023 11:00)  HR: 105 (2023 06:00) (104 - 117)  BP: --  BP(mean): --  ABP: 119/64 (2023 06:00) (107/53 - 140/76)  ABP(mean): 80 (2023 06:00) (68 - 95)  RR: 25 (2023 06:00) (16 - 47)  SpO2: 95% (2023 06:00) (92% - 99%)    O2 Parameters below as of 2023 03:00  Patient On (Oxygen Delivery Method): nasal cannula  O2 Flow (L/min): 2      I&O's Summary    2023 07:01  -  2023 07:00  --------------------------------------------------------  IN: 715 mL / OUT: 800 mL / NET: -85 mL    2023 07:01  -  2023 14:32  --------------------------------------------------------  IN: 515 mL / OUT: 350 mL / NET: 165 mL      Daily     Daily Weight in k.5 (28 Oct 2023 12:15)      EKG/Telemetry Events:    MEDICATIONS  (STANDING):  aspirin enteric coated 81 milliGRAM(s) Oral daily  atorvastatin 80 milliGRAM(s) Oral at bedtime  buMETAnide 2 milliGRAM(s) Oral daily  chlorhexidine 2% Cloths 1 Application(s) Topical daily  dextrose 5%. 1000 milliLiter(s) (50 mL/Hr) IV Continuous <Continuous>  dextrose 5%. 1000 milliLiter(s) (100 mL/Hr) IV Continuous <Continuous>  digoxin     Tablet 125 MICROGram(s) Oral daily  heparin  Infusion 1200 Unit(s)/Hr (12 mL/Hr) IV Continuous <Continuous>  insulin glargine Injectable (LANTUS) 24 Unit(s) SubCutaneous at bedtime  insulin lispro (ADMELOG) corrective regimen sliding scale   SubCutaneous three times a day before meals  insulin lispro (ADMELOG) corrective regimen sliding scale   SubCutaneous at bedtime  insulin lispro Injectable (ADMELOG) 8 Unit(s) SubCutaneous three times a day before meals  milrinone Infusion 0.125 MICROgram(s)/kG/Min (2.63 mL/Hr) IV Continuous <Continuous>  pantoprazole    Tablet 40 milliGRAM(s) Oral before breakfast  senna 2 Tablet(s) Oral at bedtime  tamsulosin 0.4 milliGRAM(s) Oral at bedtime    MEDICATIONS  (PRN):  benzocaine/menthol Lozenge 1 Lozenge Oral every 2 hours PRN Sore Throat  polyethylene glycol 3350 17 Gram(s) Oral daily PRN Constipation      PHYSICAL EXAM:  GENERAL: AAOx2-3 NAD  HEAD:  Atraumatic, Normocephalic  EYES: PERRLA, conjunctiva and sclera clear  NECK: Supple, No JVD, Normal thyroid, no enlarged nodes  NERVOUS SYSTEM: Strength 5/5 throughout (unable to assess RLE strength due to swan), sensation present throughout, CN intact, pt w/ difficulty expressing words almost like Broca's aphasia  CHEST/LUNG: coarse breath sounds on expiration LLL; No rales, rhonchi, or wheezing  HEART: S1S2 normal, Regular rate and rhythm; No murmurs appreciated  ABDOMEN: Soft, Nontender, Nondistended; Bowel sounds present  EXTREMITIES:  2+ Peripheral Pulses, No clubbing, cyanosis, or edema  LYMPH: No lymphadenopathy noted  SKIN: No rashes or lesions    LABS:             CBC Full  -  ( 2023 00:15 )  WBC Count : 9.31 K/uL  RBC Count : 3.88 M/uL  Hemoglobin : 9.9 g/dL  Hematocrit : 31.7 %  Platelet Count - Automated : 183 K/uL  Mean Cell Volume : 81.7 fl  Mean Cell Hemoglobin : 25.5 pg  Mean Cell Hemoglobin Concentration : 31.2 gm/dL  Auto Neutrophil # : x  Auto Lymphocyte # : x  Auto Monocyte # : x  Auto Eosinophil # : x  Auto Basophil # : x  Auto Neutrophil % : x  Auto Lymphocyte % : x  Auto Monocyte % : x  Auto Eosinophil % : x  Auto Basophil % : x        132<L>  |  100  |  46<H>  ----------------------------<  216<H>  5.0   |  19<L>  |  2.05<H>    Ca    9.0      2023 00:15  Phos  5.0     -  Mg     2.1         TPro  6.1  /  Alb  2.9<L>  /  TBili  0.4  /  DBili  x   /  AST  16  /  ALT  35  /  AlkPhos  99         PTT - ( 29 Oct 2023 00:03 )  PTT:56.0 sec  CAPILLARY BLOOD GLUCOSE      POCT Blood Glucose.: 200 mg/dL (28 Oct 2023 21:15)  POCT Blood Glucose.: 324 mg/dL (28 Oct 2023 17:00)  POCT Blood Glucose.: 162 mg/dL (28 Oct 2023 11:53)            Urinalysis Basic - ( 29 Oct 2023 05:42 )    Color: x / Appearance: x / SG: x / pH: x  Gluc: 216 mg/dL / Ketone: x  / Bili: x / Urobili: x   Blood: x / Protein: x / Nitrite: x   Leuk Esterase: x / RBC: x / WBC x   Sq Epi: x / Non Sq Epi: x / Bacteria: x          RADIOLOGY & ADDITIONAL TESTS:    < from: MR Angio Neck No Cont (10.30.23 @ 20:59) >  IMPRESSION:    MRI BRAIN:  1. Evolving acute/subacute right-sided PICA distribution infarction with   associated cytotoxic edema and very mild hemorrhagic transformation.  2. Additional wedge-shaped area of acute/subacute ischemia within the   left parietal periatrial white matter with associated cytotoxic edema.  3. Indeterminate soft tissue lesion involving the left superior pinna   measuring 1.4 x 0.9 cm. Neoplasm cannot be excluded. Recommend   correlation with direct physical examination and visualization.    MRA NECK:  1. Extremely motion limited study.  2. 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 Recommend further evaluation with a CT angiogram study   of the neck.    MRA HEAD:  1. 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 This can be further evaluated with a CT angiogram study of the   head.  2. Mild focal stenosis of the right supraclinoid intracranial internal   carotid artery secondary to atherosclerosis.  3. Otherwise, no large vessel occlusion or major stenosis.    --- End of Report ---    < end of copied text >      Care Discussed with Consultants/Other Providers [ x] YES  [ ] NO

## 2023-11-03 NOTE — PROGRESS NOTE ADULT - SUBJECTIVE AND OBJECTIVE BOX
[facility] Division of Hospital Medicine  Marine Hamilton MD  AVailable on TEAMS 8a-8p    MEDICINE ACCEPT NOTE  Patient is a 58y old  Male who presents with a chief complaint of NSTEMI (2023 16:22)      SUBJECTIVE / OVERNIGHT EVENTS: Transferred to general medicine floors for further management. Reportedly with asymmetric pupils on exam, to obtain CTH to r/o hemorrhagic conversion in setting of AC and recent cardioembolic CVA  ADDITIONAL REVIEW OF SYSTEMS: unable to obtain due to underlying cognitive deficit    MEDICATIONS  (STANDING):  aspirin enteric coated 81 milliGRAM(s) Oral daily  atorvastatin 80 milliGRAM(s) Oral at bedtime  buMETAnide 2 milliGRAM(s) Oral daily  digoxin     Tablet 125 MICROGram(s) Oral daily  heparin  Infusion 1250 Unit(s)/Hr (12.5 mL/Hr) IV Continuous <Continuous>  hydrALAZINE 100 milliGRAM(s) Oral every 8 hours  insulin glargine Injectable (LANTUS) 30 Unit(s) SubCutaneous at bedtime  insulin lispro (ADMELOG) corrective regimen sliding scale   SubCutaneous three times a day before meals  insulin lispro (ADMELOG) corrective regimen sliding scale   SubCutaneous at bedtime  insulin lispro Injectable (ADMELOG) 12 Unit(s) SubCutaneous three times a day before meals  isosorbide   dinitrate Tablet (ISORDIL) 20 milliGRAM(s) Oral three times a day  metoprolol tartrate 12.5 milliGRAM(s) Oral once  pantoprazole    Tablet 40 milliGRAM(s) Oral before breakfast  senna 2 Tablet(s) Oral at bedtime  tamsulosin 0.4 milliGRAM(s) Oral at bedtime  warfarin 5 milliGRAM(s) Oral once    MEDICATIONS  (PRN):  benzocaine/menthol Lozenge 1 Lozenge Oral every 2 hours PRN Sore Throat  polyethylene glycol 3350 17 Gram(s) Oral daily PRN Constipation      CAPILLARY BLOOD GLUCOSE      POCT Blood Glucose.: 133 mg/dL (2023 11:04)  POCT Blood Glucose.: 198 mg/dL (2023 07:46)  POCT Blood Glucose.: 189 mg/dL (2023 21:12)    I&O's Summary    2023 07:01  -  2023 07:00  --------------------------------------------------------  IN: 715 mL / OUT: 800 mL / NET: -85 mL    2023 07:01  -  2023 16:44  --------------------------------------------------------  IN: 640 mL / OUT: 425 mL / NET: 215 mL        PHYSICAL EXAM:  Vital Signs Last 24 Hrs  T(C): 36.7 (2023 15:39), Max: 36.7 (2023 08:00)  T(F): 98.1 (2023 15:39), Max: 98.1 (2023 15:39)  HR: 112 (2023 16:43) (99 - 115)  BP: 123/82 (2023 16:43) (94/58 - 123/82)  BP(mean): 84 (2023 15:00) (71 - 84)  RR: 20 (2023 15:39) (16 - 47)  SpO2: 94% (2023 15:39) (93% - 100%)    Parameters below as of 2023 15:39  Patient On (Oxygen Delivery Method): room air    Daily Weight in k (2023 15:39)    CONSTITUTIONAL: NAD, ill appearing elderly male  EYES: PERRLA; conjunctiva and sclera clear  ENMT: Moist oral mucosa  NECK: Supple, JVD  RESPIRATORY: Normal respiratory effort; lungs are clear to auscultation bilaterally  CARDIOVASCULAR: Sinus tach, normal S1 and S2, +murmur; No lower extremity edema  ABDOMEN: Nontender to palpation, normoactive bowel sounds, no rebound/guarding; No hepatosplenomegaly  MUSCULOSKELETAL:  No clubbing or cyanosis of digits; no joint swelling or tenderness to palpation  PSYCH: A+O X 2, follows simple commands but confused  NEUROLOGY: CN 2-12 are intact and symmetric; no gross sensory deficits   SKIN: No rashes; no palpable lesions    LABS: reviewed                        9.9    9.31  )-----------( 183      ( 2023 00:15 )             31.7     11-    132<L>  |  100  |  46<H>  ----------------------------<  216<H>  5.0   |  19<L>  |  2.05<H>    Ca    9.0      2023 00:15  Phos  5.0     11-03  Mg     2.1     11-03    TPro  6.1  /  Alb  2.9<L>  /  TBili  0.4  /  DBili  x   /  AST  16  /  ALT  35  /  AlkPhos  99  11-03    PT/INR - ( 2023 00:15 )   PT: 12.2 sec;   INR: 1.17 ratio         PTT - ( 2023 15:22 )  PTT:59.1 sec      Urinalysis Basic - ( 2023 00:15 )    Color: x / Appearance: x / SG: x / pH: x  Gluc: 216 mg/dL / Ketone: x  / Bili: x / Urobili: x   Blood: x / Protein: x / Nitrite: x   Leuk Esterase: x / RBC: x / WBC x   Sq Epi: x / Non Sq Epi: x / Bacteria: x         [facility] Division of Hospital Medicine  Marine Hamilton MD  AVailable on TEAMS 8a-8p    MEDICINE ACCEPT NOTE  Patient is a 58y old  Male who presents with a chief complaint of NSTEMI (2023 16:22)      SUBJECTIVE / OVERNIGHT EVENTS: Transferred to general medicine floors for further management. Reporting chills and SOB, feels warm to touch,   ADDITIONAL REVIEW OF SYSTEMS: otherwise negative    MEDICATIONS  (STANDING):  aspirin enteric coated 81 milliGRAM(s) Oral daily  atorvastatin 80 milliGRAM(s) Oral at bedtime  buMETAnide 2 milliGRAM(s) Oral daily  digoxin     Tablet 125 MICROGram(s) Oral daily  heparin  Infusion 1250 Unit(s)/Hr (12.5 mL/Hr) IV Continuous <Continuous>  hydrALAZINE 100 milliGRAM(s) Oral every 8 hours  insulin glargine Injectable (LANTUS) 30 Unit(s) SubCutaneous at bedtime  insulin lispro (ADMELOG) corrective regimen sliding scale   SubCutaneous three times a day before meals  insulin lispro (ADMELOG) corrective regimen sliding scale   SubCutaneous at bedtime  insulin lispro Injectable (ADMELOG) 12 Unit(s) SubCutaneous three times a day before meals  isosorbide   dinitrate Tablet (ISORDIL) 20 milliGRAM(s) Oral three times a day  metoprolol tartrate 12.5 milliGRAM(s) Oral once  pantoprazole    Tablet 40 milliGRAM(s) Oral before breakfast  senna 2 Tablet(s) Oral at bedtime  tamsulosin 0.4 milliGRAM(s) Oral at bedtime  warfarin 5 milliGRAM(s) Oral once    MEDICATIONS  (PRN):  benzocaine/menthol Lozenge 1 Lozenge Oral every 2 hours PRN Sore Throat  polyethylene glycol 3350 17 Gram(s) Oral daily PRN Constipation      CAPILLARY BLOOD GLUCOSE      POCT Blood Glucose.: 133 mg/dL (2023 11:04)  POCT Blood Glucose.: 198 mg/dL (2023 07:46)  POCT Blood Glucose.: 189 mg/dL (2023 21:12)    I&O's Summary    2023 07:01  -  2023 07:00  --------------------------------------------------------  IN: 715 mL / OUT: 800 mL / NET: -85 mL    2023 07:01  -  2023 16:44  --------------------------------------------------------  IN: 640 mL / OUT: 425 mL / NET: 215 mL        PHYSICAL EXAM:  Vital Signs Last 24 Hrs  T(C): 36.7 (2023 15:39), Max: 36.7 (2023 08:00)  T(F): 98.1 (2023 15:39), Max: 98.1 (2023 15:39)  HR: 112 (2023 16:43) (99 - 115)  BP: 123/82 (2023 16:43) (94/58 - 123/82)  BP(mean): 84 (2023 15:00) (71 - 84)  RR: 20 (2023 15:39) (16 - 47)  SpO2: 94% (2023 15:39) (93% - 100%)    Parameters below as of 2023 15:39  Patient On (Oxygen Delivery Method): room air    Daily Weight in k (2023 15:39)    CONSTITUTIONAL: NAD, ill appearing elderly male  EYES: PERRLA; conjunctiva and sclera clear  ENMT: Moist oral mucosa  NECK: Supple, JVD  RESPIRATORY: Normal respiratory effort; +basilar crackles  CARDIOVASCULAR: Sinus tach, normal S1 and S2, +murmur; trace lower extremity edema  ABDOMEN: Nontender to palpation, normoactive bowel sounds, no rebound/guarding; No hepatosplenomegaly  MUSCULOSKELETAL:  No clubbing or cyanosis of digits; no joint swelling or tenderness to palpation  PSYCH: A+O X 2, follows simple commands but confused, decreased strength b/l LE but due to effort  NEUROLOGY: CN 2-12 are intact and symmetric; no gross sensory deficits   SKIN: No rashes; no palpable lesions. R groin access site intact    LABS: reviewed                        9.9    9.31  )-----------( 183      ( 2023 00:15 )             31.7     11-    132<L>  |  100  |  46<H>  ----------------------------<  216<H>  5.0   |  19<L>  |  2.05<H>    Ca    9.0      2023 00:15  Phos  5.0     11  Mg     2.1         TPro  6.1  /  Alb  2.9<L>  /  TBili  0.4  /  DBili  x   /  AST  16  /  ALT  35  /  AlkPhos  99  11-03    PT/INR - ( 2023 00:15 )   PT: 12.2 sec;   INR: 1.17 ratio         PTT - ( 2023 15:22 )  PTT:59.1 sec      Urinalysis Basic - ( 2023 00:15 )    Color: x / Appearance: x / SG: x / pH: x  Gluc: 216 mg/dL / Ketone: x  / Bili: x / Urobili: x   Blood: x / Protein: x / Nitrite: x   Leuk Esterase: x / RBC: x / WBC x   Sq Epi: x / Non Sq Epi: x / Bacteria: x      < from: Xray Chest 1 View- PORTABLE-Routine (Xray Chest 1 View- PORTABLE-Routine in AM.) (23 @ 05:37) >  IMPRESSION:  1.  Interval removal of lower extremity access [place] catheter.  2.  Stable right upper lobe and mid left lung infiltrates.  3.  Stable small bilateral pleural effusions.    4.  Mild pulmonary venous congestion, unchanged    < end of copied text >

## 2023-11-03 NOTE — PROGRESS NOTE ADULT - PROBLEM SELECTOR PLAN 1
TTE 10/26: EF <20 %, LV apical thrombus is seen. Small pericardial effusion noted adjacent to the right ventricle with no evidence of hemodynamic compromise.  - cont hep to coumadin bridge, monitor INR

## 2023-11-03 NOTE — PROGRESS NOTE ADULT - NS ATTEND AMEND GEN_ALL_CORE FT
Patient found in bed in [facility], he is pleasantly confused. He's been off inotropes since 11/1 and is tolerating GMDT with hydralazine. Unfortunately, he is not a candidate for revascularization surgically due to lack of viability in LAD territory and evolving stroke or percutaneously due to LV thrombus and poor candidacy for AT. He is undergoing evaluation by behavioral team for poor cognition/AMS. His creat remains elevated but stable. He is slightly overloaded on exam.      Give Bumex 2 mg iv push and continue with 2 mg PO for maintenance   Continue to uptitrate hydralazine/ISDN to target MAP ~70 mmHg   Increase metoprolol to 12.5 mg q 6hrs then continue to up-titrate to max dose or HR <70 bpm - convert to succinate on discharge or once max dose reached   Hold MRA and ARNi  Continue A/C for LV apical thrombus   Aggressive PT   Behavioral team follow up for neurocognitive testing

## 2023-11-03 NOTE — PROGRESS NOTE ADULT - ASSESSMENT
58M with PMH of CVA with mild left sided deficits, HTN, DM2, vertigo, HLD, Mobitz II s/p Medtronic dual chamber ICD (12/21/22) initially presented to OSH for SOB c/f ADHF vs. PNA, later found to have NSTEMI transferred to [facility] for LHC evaluation. 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 Admitted to CICU for hypotension, inotropic support, hemodynamic monitoring. Previously downgraded on 10/22, RRT called while on the floor for AMS and hypotensive to 80/40, CTH 10/27 found possible R cerebellar infarct, patient re-transferred to CICU for inotropic support.

## 2023-11-03 NOTE — PROGRESS NOTE ADULT - PROBLEM SELECTOR PLAN 3
complicated by cardiogenic shock, acute systolic HF, s/p CCU stay and inotropiic support, now weaned off milrinone 10/28 -11/1  TTE 10/16: EF 20%, severely reduced LVSF  RHC 10/19: RA 10, PCWP 20, CO 4.4, CI 2.3  cMR 10/18: limited viability in LAD territory  R groin swan placed 10/30: RA 14/13/12, RV 51/14, PA 47/34/39, PCW 34/36/32, CI 2.5/CO 4.95, removed 11/2  - trend lactate daily  - c/w bumex 2 mg PO qd  - Appreciate ongoing HF recs  - GDMT: hold Entresto i/s/o hypotension, spironolactone due to LAURA, c/w metoprolol 12.5 q6 uptitrate as recommended by HF and farxiga prior to DC, lipitor 80mg  - cont hydral 50 tid  - s/p dig load, dig level 1.4, acceptable per HF   - f/u device interrogation  - avoid BB/CCB given borderline cardiac function  - strict Is/Os, daily weights  - HF holding off on launching VAD/transplant eval given cognitive issues however will continue to reassess.  - CXR 11/2 with stable b/l pleural effusions, mild pulmonary edema: to receive additional dose bumex IV X 1 today

## 2023-11-03 NOTE — PROGRESS NOTE ADULT - ASSESSMENT
59 yo Male with prior Stroke with mild ?left sided deficits (improved), HTN, DM2, vertigo, HLD, Mobitz II s/pp Medtronic dual chamber ICD (12/21/22) initially presented to [facility] from home with complaints of dyspnea and chest pain and was admitted w/ acute hypoxic respiratory failure likely due to acute pulmonary edema due to acute HFrEF in setting of acute NSTEMI, LAURA and enterovirus infection. Pt with brief MICU stay at [facility] requiring bipap (no intubation), was treated for PNA with cefepime, and was found to have TTE done 9/26/23 showing EF 20% with severely decreased LVSF, multiple regional wall motion abnormalities, and elevated LV end diastolic pressure. Pt was transferred to [facility] for cardiac eval.   TTE EF < 20%  RHC 10/19: RA 10, RV 47/9/13, Wedge 29, PA 41/26/33, CO/CI 4.4/2.3, PA sat 57.3  EKG: sinus tachycardia, septal q-waves, left axis deviation   TTE 10/16/23: LV 5.5 cm, LVEF 20% with regional WMAs worse in the apex, LVOT VTI 8 cm, normal RV size/function, severe functional MR, small pericardial effusion, estimated RA pressure 8 mmHg   TTE 12/2022: normal LVEF   LHC: 95% discrete proximal LAD disease and sequential multifocal disease with good distal target, 80-90% proximal LCx disease just prior to marginals, severe multifocal RCA disease with good targets   A1c 8.4    CD 10/17 neg   NIHSS 1  CTH with age indeterminate R cerebellar infarct.  likely chronic   MRi brain with eovlving acute/subacute R PICA infarct with mild edema and mild hemorrhagic transformation. 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  occluded R VA.   CTH stable     Impression:   R cerebellar/PICA infarct as well as L parietal embolic infarct, likely cardioembolic         - CTS eval for CABG given 3 vessel disease vs high risk PCI --> not a CABG candidate   -  in CCU for milrinone pressure support   - c/w asa and statin therpay for secondary stroke prevention.   - no objection to heparin drip for low EF and LV thrombus  --> no objection to transition to coumadin   - called for risk stratification for heart transplant eval,  low-mod risk and no objection   - b12, TSH, RPR for reversible causes of dementia   - telemetry  - PT/OT   - check FS, glucose control <180  - GI/DVT ppx   - Thank you for allowing me to participate in the care of this patient. Call with questions.   spoke with primary team     Abelardo Irving MD  Vascular Neurology  Office: [phone]

## 2023-11-03 NOTE — PROGRESS NOTE ADULT - SUBJECTIVE AND OBJECTIVE BOX
Neurology        S: patient seen and examined. MRI confirms embolic infarcts           Medications: MEDICATIONS  (STANDING):  aspirin enteric coated 81 milliGRAM(s) Oral daily  atorvastatin 80 milliGRAM(s) Oral at bedtime  buMETAnide 2 milliGRAM(s) Oral daily  chlorhexidine 2% Cloths 1 Application(s) Topical daily  digoxin     Tablet 125 MICROGram(s) Oral daily  heparin  Infusion 1250 Unit(s)/Hr (12.5 mL/Hr) IV Continuous <Continuous>  hydrALAZINE 50 milliGRAM(s) Oral every 8 hours  insulin glargine Injectable (LANTUS) 28 Unit(s) SubCutaneous at bedtime  insulin lispro (ADMELOG) corrective regimen sliding scale   SubCutaneous three times a day before meals  insulin lispro (ADMELOG) corrective regimen sliding scale   SubCutaneous at bedtime  insulin lispro Injectable (ADMELOG) 12 Unit(s) SubCutaneous three times a day before meals  isosorbide   dinitrate Tablet (ISORDIL) 20 milliGRAM(s) Oral three times a day  metoprolol tartrate 12.5 milliGRAM(s) Oral two times a day  pantoprazole    Tablet 40 milliGRAM(s) Oral before breakfast  senna 2 Tablet(s) Oral at bedtime  tamsulosin 0.4 milliGRAM(s) Oral at bedtime    MEDICATIONS  (PRN):  benzocaine/menthol Lozenge 1 Lozenge Oral every 2 hours PRN Sore Throat  polyethylene glycol 3350 17 Gram(s) Oral daily PRN Constipation       Vitals:  Vital Signs Last 24 Hrs  T(C): 36.6 (03 Nov 2023 03:00), Max: 36.9 (02 Nov 2023 15:00)  T(F): 97.9 (03 Nov 2023 03:00), Max: 98.4 (02 Nov 2023 15:00)  HR: 111 (03 Nov 2023 10:00) (99 - 117)  BP: --  BP(mean): --  RR: 24 (03 Nov 2023 10:00) (16 - 47)  SpO2: 100% (03 Nov 2023 10:00) (93% - 100%)    Parameters below as of 03 Nov 2023 10:00  Patient On (Oxygen Delivery Method): room air           General Exam:   General Appearance: Appropriately dressed and in no acute distress       Head: Normocephalic, atraumatic and no dysmorphic features  Ear, Nose, and Throat: Moist mucous membranes  CVS: S1S2+  Resp: No SOB, no wheeze or rhonchi  GI: soft NT/ND  Extremities: No edema or cyanosis  Skin: No bruises or rashes     Neurological Exam:  Mental Status: Awake, alert and oriented x 2.  Able to follow simple verbal commands. Able to name and repeat. fluent speech. No obvious aphasia or dysarthria noted. poor insight. not understanding why he is in hospital   Cranial Nerves: PERRL, EOMI, VFFC, sensation V1-V3 intact,  no obvious facial asymmetry, equal elevation of palate, scm/trap 5/5, tongue is midline on protrusion. no obvious papilledema on fundoscopic exam. hearing is grossly intact.   Motor: Normal bulk, tone and strength throughout. Fine finger movements were intact and symmetric. no tremors or drift noted.    Sensation: Intact to light touch and pinprick throughout. no right/left confusion. no extinction to tactile on DSS. Romberg was negative.   Reflexes: 1+ throughout at biceps, brachioradialis, triceps, patellars and ankles bilaterally and equal. No clonus. R toe and L toe were both downgoing.  Coordination: No dysmetria on FNF or HKS  Gait:   no limitations in gait.     Data/Labs/Imaging which I personally reviewed.     Labs:       LABS:                          9.9    9.31  )-----------( 183      ( 03 Nov 2023 00:15 )             31.7     11-03    132<L>  |  100  |  46<H>  ----------------------------<  216<H>  5.0   |  19<L>  |  2.05<H>    Ca    9.0      03 Nov 2023 00:15  Phos  5.0     11-03  Mg     2.1     11-03    TPro  6.1  /  Alb  2.9<L>  /  TBili  0.4  /  DBili  x   /  AST  16  /  ALT  35  /  AlkPhos  99  11-03    LIVER FUNCTIONS - ( 03 Nov 2023 00:15 )  Alb: 2.9 g/dL / Pro: 6.1 g/dL / ALK PHOS: 99 U/L / ALT: 35 U/L / AST: 16 U/L / GGT: x           PT/INR - ( 03 Nov 2023 00:15 )   PT: 12.2 sec;   INR: 1.17 ratio         PTT - ( 03 Nov 2023 06:42 )  PTT:83.3 sec  Urinalysis Basic - ( 03 Nov 2023 00:15 )    Color: x / Appearance: x / SG: x / pH: x  Gluc: 216 mg/dL / Ketone: x  / Bili: x / Urobili: x   Blood: x / Protein: x / Nitrite: x   Leuk Esterase: x / RBC: x / WBC x   Sq Epi: x / Non Sq Epi: x / Bacteria: x            < from: CT Head No Cont (10.27.23 @ 18:04) >    ACC: [id number] EXAM:  CT BRAIN   ORDERED BY: BRETT HOPSON     PROCEDURE DATE:  10/27/2023          INTERPRETATION:  Clinical Indication: CVA    5mm axial sections of the brain were obtained from base to vertex,   without the intravenous administration of contrast material. Coronal and   sagittal computer generated reconstructed views are available.    No prior brain imaging is available for comparison.          The ventricles and sulci are prominent consistent with mild atrophy.   Small vesselwhite matter ischemic changes are noted. There is a infarct   in the right medial cerebellum of undetermined age which could be acute   to subacute based on its degree of lucency. There is no significant   hemorrhage. Bone window examination is unremarkable. There is been   previous right-sided lens replacement surgery.      IMPRESSION: Atrophy and small vessel white matter ischemic changes. Right   medial cerebellar infarct may be acute versus subacute. No hemorrhage.      --- End of Report ---    < from: MR Angio Head No Cont (10.30.23 @ 20:58) >    ACC: [id number] EXAM:  MR ANGIO BRAIN   ORDERED BY: NATALIE CARRANZA     ACC: [id number] EXAM:  MR ANGIO NECK   ORDERED BY: NATALIE CARRANZA     ACC: [id number] EXAM:  MR BRAIN   ORDERED BY: NAVNEET CORONADO     PROCEDURE DATE:  10/30/2023          INTERPRETATION:  .    CLINICAL INFORMATION: Stroke.    TECHNIQUE: Multiplanar multi sequential MRI examination of the brain was   performed without the administration of IV gadolinium. MRA images through   the neck and [place] of [place] were obtained usinga combination of 2-D   and 3-D time-of-flight acquisition. The data was then reformatted into a   volumetric data set and reviewed as rotational MIP images.    COMPARISON: Most recent prior CT examination of the head from 10/27/2023.   No prior MRI studies are available for comparison.    FINDINGS:    MRI Brain: A wedge-shaped area of diffusion restriction is seen within   the right PICA territory. Associated T2/FLAIR hyperintense signal is also   seen, compatible with cytotoxic edema. Mild hemorrhagic transformation is   seen within the infarct bed manifested by high signal on T1-weighted   imaging as well as susceptibility artifact on the SWI sequence. Mild mass   effect is seen without effacement of the fourth ventricle or shift of the   posterior fossa midline structures.    This is superimposed upon encephalomalacia and gliosis involving the   right cerebellar hemisphere.    An additional vague area of diffusion reduction is seen within the left   parietal periventricular white matter without associated T2/FLAIR   hyperintense signal, compatible with cytotoxic edema. No gross   hemorrhagic transformation is noted in this location.    Scattered foci of susceptibility artifact are seen within the bilateral   basal ganglia, compatible with areas of chronic microhemorrhage.    Multiple additional patchy confluent nonspecific T2/FLAIR hyperintense   signal changes are noted throughout the bihemispheric white matter   without associated mass effect or restricted diffusion.    Ventricular size and configuration is unremarkable. No abnormal   extra-axial fluid collections are seen. Flow-voids are noted throughout   the major intracranial vessels, on the T2 weighted images, consistent   with their patency. The sellar location appears unremarkable. There is an   unchanged appearing small retrocerebellar arachnoid cyst versus cisterna   magna.    A polyp versus retention cyst is seen within the right maxillary sinus.   Additional minor scattered mucosal thickening seen throughout the ethmoid   labyrinth. The tympanomastoid cavities are clear. The left orbit appears   within normal limits. There is evidence of right-sided cataract removal.    There is an indeterminate mixed signal ovoid shaped soft tissue focus   involving the left superior pinna measuring 1.4 x 0.9 cm which appears   unchanged.    MRA Neck: Examination is motion limited.    There is a standard anatomic variation to the aortic arch. The origins of   the great vessels appear grossly unremarkable.    The bilateral common carotid arteries, carotid bifurcations and cervical   internal carotid arteries appear patent. The origin of the left vertebral   artery is normal. The left vertebral artery is patent.    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    MRA [place] of [place]: Atherosclerosis affects the bilateral carotid   siphons with mild area of focal stenosis involving the right   clinoid/supraclinoid junction. There is mild hypoplasia of the right A1   segment. Otherwise, the bilateral intracranial internal carotid,   anterior, and middle cerebral arteries appear unremarkable.    The anterior and bilateral posterior communicating arteries are seen.    The right V4 segment does not demonstrate flow relatedsignal. The left   V4 segment appears unremarkable. The vertebrobasilar confluence appears   unremarkable. Long segment mild stenosis involving the mid basilar   artery. The basilar tip appears unremarkable as well as the bilateral   posterior cerebral arteries.    IMPRESSION:    MRI BRAIN:  1. Evolving acute/subacute right-sided PICA distribution infarction with   associated cytotoxic edema and very mild hemorrhagic transformation.  2. Additional wedge-shaped area of acute/subacute ischemia within the   left parietal periatrial white matter with associated cytotoxic edema.  3. Indeterminate soft tissue lesion involving the left superior pinna   measuring 1.4 x 0.9 cm. Neoplasm cannot be excluded. Recommend   correlation with direct physical examination and visualization.    MRA NECK:  1. Extremely motion limited study.  2. 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 Recommend further evaluation with a CT angiogram study   of the neck.    MRA HEAD:  1. 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 This can be further evaluated with a CT angiogram study of the   head.  2. Mild focal stenosis of the right supraclinoid intracranial internal   carotid artery secondary to atherosclerosis.  3. Otherwise, no large vessel occlusion or major stenosis.    --- End of Report ---            LAKESHA DIAZ MD; Attending Radiologist  This document has been electronically signed. Oct 30 2023  9:20PM    < end of copied text >

## 2023-11-03 NOTE — CHART NOTE - NSCHARTNOTEFT_GEN_A_CORE
Called by RN patient has right pupil that is "less responsive" on neuro checks.     Patient recent downgrade from CICU     HPI: 59 yo Male pmhx CVA with mild left sided deficits, HTN, DM2, vertigo, HLD, Mobitz II s/pp Medtronic dual chamber ICD (12/21/22) initially presented to OSH for SOB c/f ADHF vs. PNA, later found to have NSTEMI transferred to [facility] for LHC evaluation. 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 Admitted to CICU for hypotension, inotropic support, hemodynamic monitoring. Previously downgraded on 10/22, RRT called while on the floor for AMS and hypotensive to 80/40, CTH 10/27 found possible R cerebellar infarct, patient re-transferred to CICU for inotropic support.    Patient seen and examined, alert and oriented x 2, c/o "feeling chilly" and has multiple blankets on. ? possible infection. Patient was not warm to touch. Spoke with RN, afebrile via rectal temp.   Bilateral pupils equal and brisk on this writer's exam, however given concern for right pupil, ICU was called this evening for verification as unable to verify if right pupil being sluggish is acute per chart review  Per ICU resident pupils have been "brisk and responsive".   Spoke with Dr. Hamilton, CTH ordered however cancelled as both pupils responsive. If acute issue arises overnight, will need repeat CTH.     Given concern for possible infection in the setting of opacification in the setting of recent cardiogenic shock as d/w Dr. Hamilton, RVP ordered along with repeat CXR in AM. If febrile, will need to order blood cultures x 2 (recent central lines were placed in ICU but have been removed), procalcitonin, lactate, UA and reflex Ucx.      Zoey Bean, PAPabloC  [id number]

## 2023-11-03 NOTE — PROGRESS NOTE ADULT - PROBLEM SELECTOR PLAN 6
secondary to increased cardiac demand vs inotrope effect  - per EP, appears sinus tach, no indication for cardioversion/EMILEE  - s/p dig load, dig level 1.4, acceptable per HF   - per EP, no need to upgrade device at this time  - monitor on tele

## 2023-11-03 NOTE — PROGRESS NOTE ADULT - ASSESSMENT
====================ASSESSMENT ==============  59 yo Male pmhx CVA with mild left sided deficits, HTN, DM2, vertigo, HLD, Mobitz II s/pp Medtronic dual chamber ICD (12/21/22) initially presented to OSH for SOB c/f ADHF vs. PNA, later found to have NSTEMI transferred to [facility] for C evaluation. 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 Admitted to CICU for hypotension, inotropic support, hemodynamic monitoring. Previously downgraded on 10/22, RRT called while on the floor for AMS and hypotensive to 80/40, CTH 10/27 found possible R cerebellar infarct, patient re-transferred to CICU for inotropic support.    NEUROLOGICAL  #L Parietal and R PICA infarct/ embolic shower  #Encephalopathy   #Hx of CVA w/ mild L residual deficit  - currently A&Ox2-3  - MR head 10/30 w/ R PICA infarct and L parietal infarct, likely due to embolic shower, per neuro. Very mild hemorrhagic transformation in R PICA distribution   - MR neck showing occluded R-sided intracranial vertebral artery of unknown timeframe and mild focal stenosis of the R supraclinoid intracranial internal   carotid artery secondary to atherosclerosis  - per neuro, c/w heparin gtt as repeat CT head w/ no hemorrhage  - CT head 10/27 w/ atrophy and small vessel white matter ischemic changes  - neurology following  - B12, TSH, RPR wnl  - neuro checks    CARDIOVASCULAR  #Hypotension/cardiogenic shock, iso acute on chronic HFrEF  TTE 10/16: EF 20%, severely reduced LVSF  LHC 10/16: 95% pLAD, 80% mid and distal LCx, 90% mid and distal RCA  RHC 10/19: RA 10, PCWP 20, CO 4.4, CI 2.3  cMR 10/18: limited viability in LAD territory  TTE 10/26: EF <20 %, LV apical thrombus is seen. Small pericardial effusion noted adjacent to the right ventricle with no evidence of hemodynamic compromise.  -R groin swan placed 10/30: RA 14/13/12, RV 51/14, PA 47/34/39, PCW 34/36/32, CI 2.5/CO 4.95   - RRT called for 80/40 while on the floor, milrinone 0.125 restarted on 10/28, weaned off 11/1  - trend lactate, proBNP  - c/w bumex 2 mg PO qd  -s/p bumex 1 this AM  - presently net positive, consider further bumex as BP and Cr allows  - pending HF recs regarding high risk PCI vs further intervention; EP recommended no indication for upgrade at this time. HF holding off on launching VAD/transplant eval  - hold Entresto i/s/o hypotension  - hydral increased to 50 tid  - s/p dig load, dig level 1.4, acceptable per HF   - f/u device interrogation  - avoid BB/CCB given borderline cardiac function  - start GDMT when appropriate, start metoprolol and farxiga tomorrow    #Triple vessel disease  [facility] 10/16: 95% pLAD, 80% mid and distal LCx, 90% mid and distal RCA  -per CT surg, pt is not a good candidate for CABG given lack of viability in the LAD territory  -pending HF AT eval    #LV apical thrombus  -c/w heparin gtt  -MRI brain and MRA H/N showing 2 infarcts  -neuro checks  -not a candidate for impella assisted PCI    #Tachycardia  Mobitz II s/p AICD  - Sinus tach vs. atach vs. aflutter. Iso increased cardiac demand/ hypoxia vs. iso infxn?  - per EP, appears sinus tach, no indication for cardioversion/EMILEE  - s/p dig load, dig level 1.4, acceptable per HF   - per EP, no need to upgrade device at this time    #HLD  - c/w Atorv 80mg     RESPIRATORY  #Pulmonary edema  Likely iso CHF vs. superimposed PNA  CT chest 10/28: scattered patchy and nodular bilateral upper lobe predominant groundglass opacities with interlobular septal thickening representing pulmonary edema  -c/w Bumex 2 qd   -infectious cause not excluded, low threshold to add abx if leukocytosis or febrile, as pt persistently tachy  -f/u noncontrast CT chest in 1-3 month to ensure resolution    GI/NUTRITION  No active issues  - CC DASH Halal diet  - GI ppx: PPI 40mg daily  - c/w Bowel regimen    /RENAL  #LAURA:   - sCr on admission 1.28, stable today although remains elevated, ddx: iso congestion vs. prerenal from diuresis  -consider urine lytes  -Trend sCr  -Monitor I/O  -c/w diuresis    #Elevated lactate - Improving  - uptrending lactate likely iso cardiogenic shock  - now at normal range  - c/w Milrinone, wean as tolerated  - continue to trend    #BPH  - c/w Flomax 0.4mg qhs    SKIN  - no active issues    INFECTIOUS DISEASE  - No active issues  - plan for pulmonary edema as above  - Recent admission to OSH for ?PNA s/p 14 days of cefepime (last dose 10/14)  - Currently no signs of infection, WBC normal, afebrile, although monitor iso persistent tachycardia    ENDOCRINE    #DM2  - A1c 10/14 8.4%  - increased Lantus 28u qhs + lispro 12u premeal + ISS  - trend FS, today were elevated    #Thyroid nodule  CT chest: 3. 3 x 1.8 cm left supraclavicular nodule likely arising from the thyroid gland   -thyroid US outpatient    HEMATOLOGIC/DVT PPX  - No active issues  - DVT ppx: heparin gtt    #ETHICS  Full code ====================ASSESSMENT ==============  59 yo Male pmhx CVA with mild left sided deficits, HTN, DM2, vertigo, HLD, Mobitz II s/pp Medtronic dual chamber ICD (12/21/22) initially presented to OSH for SOB c/f ADHF vs. PNA, later found to have NSTEMI transferred to [facility] for LHC evaluation. 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 Admitted to CICU for hypotension, inotropic support, hemodynamic monitoring. Previously downgraded on 10/22, RRT called while on the floor for AMS and hypotensive to 80/40, CTH 10/27 found possible R cerebellar infarct, patient re-transferred to CICU for inotropic support.    NEUROLOGICAL  #L Parietal and R PICA infarct/ embolic shower  #Encephalopathy   #Hx of CVA w/ mild L residual deficit  - currently A&Ox2-3  - MR head 10/30 w/ R PICA infarct and L parietal infarct, likely due to embolic shower, per neuro. Very mild hemorrhagic transformation in R PICA distribution   - MR neck showing occluded R-sided intracranial vertebral artery of unknown timeframe and mild focal stenosis of the R supraclinoid intracranial internal   carotid artery secondary to atherosclerosis  - switch heparin gtt to coumadin today for goal INR 2-3, f/u INR tomorrow AM   - CT head 10/27 w/ atrophy and small vessel white matter ischemic changes  - neurology following  - B12, TSH, RPR wnl  - neuro checks    CARDIOVASCULAR  #Hypotension/cardiogenic shock, iso acute on chronic HFrEF  TTE 10/16: EF 20%, severely reduced LVSF  LHC 10/16: 95% pLAD, 80% mid and distal LCx, 90% mid and distal RCA  RHC 10/19: RA 10, PCWP 20, CO 4.4, CI 2.3  cMR 10/18: limited viability in LAD territory  TTE 10/26: EF <20 %, LV apical thrombus is seen. Small pericardial effusion noted adjacent to the right ventricle with no evidence of hemodynamic compromise.  -R groin swan placed 10/30: RA 14/13/12, RV 51/14, PA 47/34/39, PCW 34/36/32, CI 2.5/CO 4.95   - RRT called for 80/40 while on the floor, milrinone 0.125 restarted on 10/28, weaned off 11/1  - trend lactate, proBNP  - c/w bumex 2 mg PO qd  -s/p bumex 1 today  - presently net neg, w/ incontinence not documented  - pending HF recs regarding high risk PCI vs further intervention; EP recommended no indication for upgrade at this time. HF holding off on launching VAD/transplant eval  - hold Entresto i/s/o hypotension  - hydral increased to 50 tid  - s/p dig load, dig level 1.4, acceptable per HF   - f/u device interrogation  - avoid BB/CCB given borderline cardiac function  - start GDMT when appropriate, c/w metoprolol 12.5 q6 uptitrate as recommended by HF and farxiga tomorrow    #Triple vessel disease  [facility] 10/16: 95% pLAD, 80% mid and distal LCx, 90% mid and distal RCA  -per CT surg, pt is not a good candidate for CABG given lack of viability in the LAD territory  -pending HF AT eval    #LV apical thrombus  -switch heparin gtt to coumadin today, f/u INR in AM and d/c heparin gtt if therapeutic  -MRI brain and MRA H/N showing 2 infarcts  -neuro checks  -not a candidate for impella assisted PCI    #Tachycardia  Mobitz II s/p AICD  - Sinus tach vs. atach vs. aflutter. Iso increased cardiac demand/ hypoxia vs. iso infxn?  - per EP, appears sinus tach, no indication for cardioversion/EMILEE  - s/p dig load, dig level 1.4, acceptable per HF   - per EP, no need to upgrade device at this time    #HLD  - c/w Atorv 80mg     RESPIRATORY  #Pulmonary edema  Likely iso CHF vs. superimposed PNA  CT chest 10/28: scattered patchy and nodular bilateral upper lobe predominant groundglass opacities with interlobular septal thickening representing pulmonary edema  -c/w Bumex 2 qd   -infectious cause not excluded, low threshold to add abx if leukocytosis or febrile, as pt persistently tachy  -f/u noncontrast CT chest in 1-3 month to ensure resolution    GI/NUTRITION  No active issues  - CC DASH Halal diet  - GI ppx: PPI 40mg daily  - c/w Bowel regimen    /RENAL  #LAURA:   - sCr on admission 1.28, stable today although remains elevated, ddx: iso congestion vs. prerenal from diuresis  -consider urine lytes  -Trend sCr  -Monitor I/O  -c/w diuresis    #Elevated lactate - Improving  - uptrending lactate likely iso cardiogenic shock  - now at normal range  - c/w Milrinone, wean as tolerated  - continue to trend    #BPH  - c/w Flomax 0.4mg qhs    SKIN  - no active issues    INFECTIOUS DISEASE  - No active issues  - plan for pulmonary edema as above  - Recent admission to OSH for ?PNA s/p 14 days of cefepime (last dose 10/14)  - Currently no signs of infection, WBC normal, afebrile, although monitor iso persistent tachycardia    ENDOCRINE    #DM2  - A1c 10/14 8.4%  - increased Lantus 30u qhs + lispro 12u premeal + ISS  - trend FS, today only AM was elevated    #Thyroid nodule  CT chest: 3. 3 x 1.8 cm left supraclavicular nodule likely arising from the thyroid gland   -thyroid US outpatient    HEMATOLOGIC/DVT PPX  - No active issues  - DVT ppx: heparin gtt    #ETHICS  Full code

## 2023-11-03 NOTE — PROGRESS NOTE ADULT - ATTENDING COMMENTS
57yo M with CVA DM2 s/p ICD here with shortness of breath, admitted for ADHF vs PNA and LHC on 10/16 showed 3v dz and reduced EF. RHC on 10/19 showed CVP of 10, PCWP 29 and CI of 2.3, started empirically on inotropes - now weaned off    Neuro: stable  Pulm: volume overload - will c/w diuresis to maintain negative fluid status  CV: weaned off of the milrninone on 11/1   tolerating afterload reduction - will continue to optimize  appreciate CTS input - pt is not a candidate for LAD revascularization in light of poor viability in that territory  GI: DASH diet  ID: no abx  Heme: sqh ppx  Endo: BG controlled  Groin swan - will remove today    Patient is critically ill, requiring critical care services. Despite the current condition, the patient is at a high risk of clinical and hemodynamic demise

## 2023-11-03 NOTE — PROGRESS NOTE ADULT - PROBLEM SELECTOR PLAN 1
#HFrEF  - Etiology: ICM  - Last TTE: EF <20% rMWA  - Cath: : 95% discrete proximal LAD disease and sequential multifocal disease with good distal target, 80-90% proximal LCx disease just prior to marginals, severe multifocal RCA disease with good targets   - GDMT:    > BB: 11/4 can increase lopressor to 12.5mg Q6, plan to eventually transition to Toprol XL    > ACE/ARB/ARNI: Stopped Entresto 24-26 BID due to low flow and elevated Cr. Increase hydralazine and ISDN Qdose as tolerated for MAP >70    > MRA: Stopped spironolactone 25 QDay due to elevated Cr. Currently with K of 5 off MRA.    > SGLT2 I: Eventual farxiga 10mg daily prior to discharge  - Inotropes: Off milrinone since 11/1  - Diuretics: Currently on Bumex 2mg PO daily. Would give bumex 2mg IV today and redose PRN for goal net negative 1-2L  - Advanced therapies: severe LV dysfunction with tachycardia and poor non-invasive hemodynamics concerning, currently holding off on launching VAD/transplant eval given cognitive issues however will continue to reassess. Of note he has good support and no clear psychosocial red flags. ABO AB. Appreciate neuro and behavioral psych input. He's currently off inotropes and tolerating escalating doses of oral vasodilators  - F/U PT recs

## 2023-11-03 NOTE — PROGRESS NOTE ADULT - SUBJECTIVE AND OBJECTIVE BOX
ADVANCED HEART FAILURE & TRANSPLANT  - PROGRESS NOTE  *To reach the NS2 Team from 8am to 5pm, please call [phone]   ___________________________________________________________________________  Subjective:  - reports SOB at rest and with activity  - remains confused  - denies CP, lightheadedness    Medications:  aspirin enteric coated 81 milliGRAM(s) Oral daily  atorvastatin 80 milliGRAM(s) Oral at bedtime  benzocaine/menthol Lozenge 1 Lozenge Oral every 2 hours PRN  buMETAnide 2 milliGRAM(s) Oral daily  digoxin     Tablet 125 MICROGram(s) Oral daily  heparin  Infusion 1250 Unit(s)/Hr IV Continuous <Continuous>  hydrALAZINE 100 milliGRAM(s) Oral every 8 hours  insulin glargine Injectable (LANTUS) 30 Unit(s) SubCutaneous at bedtime  insulin lispro (ADMELOG) corrective regimen sliding scale   SubCutaneous at bedtime  insulin lispro (ADMELOG) corrective regimen sliding scale   SubCutaneous three times a day before meals  insulin lispro Injectable (ADMELOG) 12 Unit(s) SubCutaneous three times a day before meals  isosorbide   dinitrate Tablet (ISORDIL) 20 milliGRAM(s) Oral three times a day  metoprolol tartrate 12.5 milliGRAM(s) Oral once  pantoprazole    Tablet 40 milliGRAM(s) Oral before breakfast  polyethylene glycol 3350 17 Gram(s) Oral daily PRN  senna 2 Tablet(s) Oral at bedtime  tamsulosin 0.4 milliGRAM(s) Oral at bedtime  warfarin 5 milliGRAM(s) Oral once      Physical Exam:    Vitals:  Vital Signs Last 24 Hours  T(C): 36.7 (23 @ 15:39), Max: 36.7 (23 @ 08:00)  HR: 111 (23 @ 15:39) (99 - 115)  BP: 116/80 (23 @ 15:39) (94/58 - 116/80)  RR: 20 (23 @ 15:39) (16 - 47)  SpO2: 94% (23 @ 15:39) (93% - 100%)    Weight in k ( @ 15:39)    I&O's Summary    2023 07:01  -  2023 07:00  --------------------------------------------------------  IN: 715 mL / OUT: 800 mL / NET: -85 mL    2023 07:01  -  2023 16:23  --------------------------------------------------------  IN: 640 mL / OUT: 425 mL / NET: 215 mL    Tele: ST    General: No distress. Comfortable.  HEENT: EOM intact.  Neck: Neck supple. JVP at least moderately elevated. No masses  Chest: Diminished bilateral LL L>R, tachypneic  CV: Normal S1 and S2. No murmurs, rub, or gallops. Radial pulses normal.  Abdomen: Soft, non-distended, non-tender  Skin: No rashes or skin breakdown. Warm peripherally  Neurology: Alert, disoriented, confused. Sensation intact  Psych: Affect normal    Labs:                        9.9    9.31  )-----------( 183      ( 2023 00:15 )             31.7         132<L>  |  100  |  46<H>  ----------------------------<  216<H>  5.0   |  19<L>  |  2.05<H>    Ca    9.0      2023 00:15  Phos  5.0       Mg     2.1         TPro  6.1  /  Alb  2.9<L>  /  TBili  0.4  /  DBili  x   /  AST  16  /  ALT  35  /  AlkPhos  99  03    PT/INR - ( 2023 00:15 )   PT: 12.2 sec;   INR: 1.17 ratio      PTT - ( 2023 15:22 )  PTT:59.1 sec    Oxygen Saturation, Mixed: 64.2 ( @ 00:53)  Oxygen Saturation, Mixed: 58.2 ( @ 18:00)  Oxygen Saturation, Mixed: 54.9 ( @ 12:50)  Oxygen Saturation, Mixed: 62.6 ( @ 01:30)  Oxygen Saturation, Mixed: 54.5 (10-31 @ 18:00)      Lactate, Blood: 1.8 mmol/L (10-31 @ 18:09)

## 2023-11-04 DIAGNOSIS — J96.01 ACUTE RESPIRATORY FAILURE WITH HYPOXIA: ICD-10-CM

## 2023-11-04 LAB
ALBUMIN SERPL ELPH-MCNC: 3.6 G/DL — SIGNIFICANT CHANGE UP (ref 3.3–5)
ALBUMIN SERPL ELPH-MCNC: 3.6 G/DL — SIGNIFICANT CHANGE UP (ref 3.3–5)
ALP SERPL-CCNC: 124 U/L — HIGH (ref 40–120)
ALP SERPL-CCNC: 124 U/L — HIGH (ref 40–120)
ALT FLD-CCNC: 43 U/L — SIGNIFICANT CHANGE UP (ref 10–45)
ALT FLD-CCNC: 43 U/L — SIGNIFICANT CHANGE UP (ref 10–45)
ANION GAP SERPL CALC-SCNC: 15 MMOL/L — SIGNIFICANT CHANGE UP (ref 5–17)
ANION GAP SERPL CALC-SCNC: 15 MMOL/L — SIGNIFICANT CHANGE UP (ref 5–17)
ANION GAP SERPL CALC-SCNC: 18 MMOL/L — HIGH (ref 5–17)
ANION GAP SERPL CALC-SCNC: 18 MMOL/L — HIGH (ref 5–17)
APTT BLD: 64.5 SEC — HIGH (ref 24.5–35.6)
APTT BLD: 64.5 SEC — HIGH (ref 24.5–35.6)
APTT BLD: 70 SEC — HIGH (ref 24.5–35.6)
APTT BLD: 70 SEC — HIGH (ref 24.5–35.6)
AST SERPL-CCNC: 21 U/L — SIGNIFICANT CHANGE UP (ref 10–40)
AST SERPL-CCNC: 21 U/L — SIGNIFICANT CHANGE UP (ref 10–40)
BASE EXCESS BLDV CALC-SCNC: -1.6 MMOL/L — SIGNIFICANT CHANGE UP (ref -2–3)
BILIRUB SERPL-MCNC: 0.5 MG/DL — SIGNIFICANT CHANGE UP (ref 0.2–1.2)
BILIRUB SERPL-MCNC: 0.5 MG/DL — SIGNIFICANT CHANGE UP (ref 0.2–1.2)
BUN SERPL-MCNC: 45 MG/DL — HIGH (ref 7–23)
BUN SERPL-MCNC: 45 MG/DL — HIGH (ref 7–23)
BUN SERPL-MCNC: 49 MG/DL — HIGH (ref 7–23)
BUN SERPL-MCNC: 49 MG/DL — HIGH (ref 7–23)
CA-I SERPL-SCNC: 1.18 MMOL/L — SIGNIFICANT CHANGE UP (ref 1.15–1.33)
CA-I SERPL-SCNC: 1.18 MMOL/L — SIGNIFICANT CHANGE UP (ref 1.15–1.33)
CA-I SERPL-SCNC: 1.22 MMOL/L — SIGNIFICANT CHANGE UP (ref 1.15–1.33)
CA-I SERPL-SCNC: 1.22 MMOL/L — SIGNIFICANT CHANGE UP (ref 1.15–1.33)
CALCIUM SERPL-MCNC: 8.9 MG/DL — SIGNIFICANT CHANGE UP (ref 8.4–10.5)
CALCIUM SERPL-MCNC: 8.9 MG/DL — SIGNIFICANT CHANGE UP (ref 8.4–10.5)
CALCIUM SERPL-MCNC: 9.5 MG/DL — SIGNIFICANT CHANGE UP (ref 8.4–10.5)
CALCIUM SERPL-MCNC: 9.5 MG/DL — SIGNIFICANT CHANGE UP (ref 8.4–10.5)
CHLORIDE BLDV-SCNC: 100 MMOL/L — SIGNIFICANT CHANGE UP (ref 96–108)
CHLORIDE BLDV-SCNC: 100 MMOL/L — SIGNIFICANT CHANGE UP (ref 96–108)
CHLORIDE BLDV-SCNC: 98 MMOL/L — SIGNIFICANT CHANGE UP (ref 96–108)
CHLORIDE BLDV-SCNC: 98 MMOL/L — SIGNIFICANT CHANGE UP (ref 96–108)
CHLORIDE SERPL-SCNC: 96 MMOL/L — SIGNIFICANT CHANGE UP (ref 96–108)
CHLORIDE SERPL-SCNC: 96 MMOL/L — SIGNIFICANT CHANGE UP (ref 96–108)
CHLORIDE SERPL-SCNC: 98 MMOL/L — SIGNIFICANT CHANGE UP (ref 96–108)
CHLORIDE SERPL-SCNC: 98 MMOL/L — SIGNIFICANT CHANGE UP (ref 96–108)
CO2 BLDV-SCNC: 24 MMOL/L — SIGNIFICANT CHANGE UP (ref 22–26)
CO2 BLDV-SCNC: 24 MMOL/L — SIGNIFICANT CHANGE UP (ref 22–26)
CO2 BLDV-SCNC: 27 MMOL/L — HIGH (ref 22–26)
CO2 BLDV-SCNC: 27 MMOL/L — HIGH (ref 22–26)
CO2 SERPL-SCNC: 18 MMOL/L — LOW (ref 22–31)
CO2 SERPL-SCNC: 18 MMOL/L — LOW (ref 22–31)
CO2 SERPL-SCNC: 20 MMOL/L — LOW (ref 22–31)
CO2 SERPL-SCNC: 20 MMOL/L — LOW (ref 22–31)
CREAT SERPL-MCNC: 2 MG/DL — HIGH (ref 0.5–1.3)
CREAT SERPL-MCNC: 2 MG/DL — HIGH (ref 0.5–1.3)
CREAT SERPL-MCNC: 2.05 MG/DL — HIGH (ref 0.5–1.3)
CREAT SERPL-MCNC: 2.05 MG/DL — HIGH (ref 0.5–1.3)
EGFR: 37 ML/MIN/1.73M2 — LOW
EGFR: 37 ML/MIN/1.73M2 — LOW
EGFR: 38 ML/MIN/1.73M2 — LOW
EGFR: 38 ML/MIN/1.73M2 — LOW
GAS PNL BLDV: 129 MMOL/L — LOW (ref 136–145)
GAS PNL BLDV: 129 MMOL/L — LOW (ref 136–145)
GAS PNL BLDV: 130 MMOL/L — LOW (ref 136–145)
GAS PNL BLDV: 130 MMOL/L — LOW (ref 136–145)
GAS PNL BLDV: SIGNIFICANT CHANGE UP
GLUCOSE BLDC GLUCOMTR-MCNC: 100 MG/DL — HIGH (ref 70–99)
GLUCOSE BLDC GLUCOMTR-MCNC: 100 MG/DL — HIGH (ref 70–99)
GLUCOSE BLDC GLUCOMTR-MCNC: 133 MG/DL — HIGH (ref 70–99)
GLUCOSE BLDC GLUCOMTR-MCNC: 133 MG/DL — HIGH (ref 70–99)
GLUCOSE BLDC GLUCOMTR-MCNC: 142 MG/DL — HIGH (ref 70–99)
GLUCOSE BLDC GLUCOMTR-MCNC: 142 MG/DL — HIGH (ref 70–99)
GLUCOSE BLDC GLUCOMTR-MCNC: 143 MG/DL — HIGH (ref 70–99)
GLUCOSE BLDC GLUCOMTR-MCNC: 143 MG/DL — HIGH (ref 70–99)
GLUCOSE BLDC GLUCOMTR-MCNC: 180 MG/DL — HIGH (ref 70–99)
GLUCOSE BLDC GLUCOMTR-MCNC: 180 MG/DL — HIGH (ref 70–99)
GLUCOSE BLDC GLUCOMTR-MCNC: 186 MG/DL — HIGH (ref 70–99)
GLUCOSE BLDC GLUCOMTR-MCNC: 186 MG/DL — HIGH (ref 70–99)
GLUCOSE BLDC GLUCOMTR-MCNC: 77 MG/DL — SIGNIFICANT CHANGE UP (ref 70–99)
GLUCOSE BLDC GLUCOMTR-MCNC: 77 MG/DL — SIGNIFICANT CHANGE UP (ref 70–99)
GLUCOSE BLDV-MCNC: 132 MG/DL — HIGH (ref 70–99)
GLUCOSE BLDV-MCNC: 132 MG/DL — HIGH (ref 70–99)
GLUCOSE BLDV-MCNC: 172 MG/DL — HIGH (ref 70–99)
GLUCOSE BLDV-MCNC: 172 MG/DL — HIGH (ref 70–99)
GLUCOSE SERPL-MCNC: 139 MG/DL — HIGH (ref 70–99)
GLUCOSE SERPL-MCNC: 139 MG/DL — HIGH (ref 70–99)
GLUCOSE SERPL-MCNC: 178 MG/DL — HIGH (ref 70–99)
GLUCOSE SERPL-MCNC: 178 MG/DL — HIGH (ref 70–99)
HCO3 BLDV-SCNC: 23 MMOL/L — SIGNIFICANT CHANGE UP (ref 22–29)
HCO3 BLDV-SCNC: 23 MMOL/L — SIGNIFICANT CHANGE UP (ref 22–29)
HCO3 BLDV-SCNC: 26 MMOL/L — SIGNIFICANT CHANGE UP (ref 22–29)
HCO3 BLDV-SCNC: 26 MMOL/L — SIGNIFICANT CHANGE UP (ref 22–29)
HCT VFR BLD CALC: 32.4 % — LOW (ref 39–50)
HCT VFR BLD CALC: 32.4 % — LOW (ref 39–50)
HCT VFR BLD CALC: 36.2 % — LOW (ref 39–50)
HCT VFR BLD CALC: 36.2 % — LOW (ref 39–50)
HCT VFR BLDA CALC: 32 % — LOW (ref 39–51)
HGB BLD CALC-MCNC: 10.5 G/DL — LOW (ref 12.6–17.4)
HGB BLD CALC-MCNC: 10.5 G/DL — LOW (ref 12.6–17.4)
HGB BLD CALC-MCNC: 10.7 G/DL — LOW (ref 12.6–17.4)
HGB BLD CALC-MCNC: 10.7 G/DL — LOW (ref 12.6–17.4)
HGB BLD-MCNC: 10 G/DL — LOW (ref 13–17)
HGB BLD-MCNC: 10 G/DL — LOW (ref 13–17)
HGB BLD-MCNC: 11.1 G/DL — LOW (ref 13–17)
HGB BLD-MCNC: 11.1 G/DL — LOW (ref 13–17)
INR BLD: 1.11 RATIO — SIGNIFICANT CHANGE UP (ref 0.85–1.18)
INR BLD: 1.11 RATIO — SIGNIFICANT CHANGE UP (ref 0.85–1.18)
LACTATE BLDV-MCNC: 1.2 MMOL/L — SIGNIFICANT CHANGE UP (ref 0.5–2)
LACTATE BLDV-MCNC: 1.2 MMOL/L — SIGNIFICANT CHANGE UP (ref 0.5–2)
LACTATE BLDV-MCNC: 2.1 MMOL/L — HIGH (ref 0.5–2)
LACTATE BLDV-MCNC: 2.1 MMOL/L — HIGH (ref 0.5–2)
MAGNESIUM SERPL-MCNC: 2.1 MG/DL — SIGNIFICANT CHANGE UP (ref 1.6–2.6)
MAGNESIUM SERPL-MCNC: 2.1 MG/DL — SIGNIFICANT CHANGE UP (ref 1.6–2.6)
MAGNESIUM SERPL-MCNC: 2.2 MG/DL — SIGNIFICANT CHANGE UP (ref 1.6–2.6)
MAGNESIUM SERPL-MCNC: 2.2 MG/DL — SIGNIFICANT CHANGE UP (ref 1.6–2.6)
MCHC RBC-ENTMCNC: 25.3 PG — LOW (ref 27–34)
MCHC RBC-ENTMCNC: 25.3 PG — LOW (ref 27–34)
MCHC RBC-ENTMCNC: 25.4 PG — LOW (ref 27–34)
MCHC RBC-ENTMCNC: 25.4 PG — LOW (ref 27–34)
MCHC RBC-ENTMCNC: 30.7 GM/DL — LOW (ref 32–36)
MCHC RBC-ENTMCNC: 30.7 GM/DL — LOW (ref 32–36)
MCHC RBC-ENTMCNC: 30.9 GM/DL — LOW (ref 32–36)
MCHC RBC-ENTMCNC: 30.9 GM/DL — LOW (ref 32–36)
MCV RBC AUTO: 82.2 FL — SIGNIFICANT CHANGE UP (ref 80–100)
MCV RBC AUTO: 82.2 FL — SIGNIFICANT CHANGE UP (ref 80–100)
MCV RBC AUTO: 82.6 FL — SIGNIFICANT CHANGE UP (ref 80–100)
MCV RBC AUTO: 82.6 FL — SIGNIFICANT CHANGE UP (ref 80–100)
NRBC # BLD: 0 /100 WBCS — SIGNIFICANT CHANGE UP (ref 0–0)
PCO2 BLDV: 37 MMHG — LOW (ref 42–55)
PCO2 BLDV: 37 MMHG — LOW (ref 42–55)
PCO2 BLDV: 53 MMHG — SIGNIFICANT CHANGE UP (ref 42–55)
PCO2 BLDV: 53 MMHG — SIGNIFICANT CHANGE UP (ref 42–55)
PH BLDV: 7.29 — LOW (ref 7.32–7.43)
PH BLDV: 7.29 — LOW (ref 7.32–7.43)
PH BLDV: 7.4 — SIGNIFICANT CHANGE UP (ref 7.32–7.43)
PH BLDV: 7.4 — SIGNIFICANT CHANGE UP (ref 7.32–7.43)
PHOSPHATE SERPL-MCNC: 5.6 MG/DL — HIGH (ref 2.5–4.5)
PHOSPHATE SERPL-MCNC: 5.6 MG/DL — HIGH (ref 2.5–4.5)
PHOSPHATE SERPL-MCNC: 6 MG/DL — HIGH (ref 2.5–4.5)
PHOSPHATE SERPL-MCNC: 6 MG/DL — HIGH (ref 2.5–4.5)
PLATELET # BLD AUTO: 182 K/UL — SIGNIFICANT CHANGE UP (ref 150–400)
PLATELET # BLD AUTO: 182 K/UL — SIGNIFICANT CHANGE UP (ref 150–400)
PLATELET # BLD AUTO: 209 K/UL — SIGNIFICANT CHANGE UP (ref 150–400)
PLATELET # BLD AUTO: 209 K/UL — SIGNIFICANT CHANGE UP (ref 150–400)
PO2 BLDV: 180 MMHG — HIGH (ref 25–45)
PO2 BLDV: 180 MMHG — HIGH (ref 25–45)
PO2 BLDV: 51 MMHG — HIGH (ref 25–45)
PO2 BLDV: 51 MMHG — HIGH (ref 25–45)
POTASSIUM BLDV-SCNC: 4.6 MMOL/L — SIGNIFICANT CHANGE UP (ref 3.5–5.1)
POTASSIUM BLDV-SCNC: 4.6 MMOL/L — SIGNIFICANT CHANGE UP (ref 3.5–5.1)
POTASSIUM BLDV-SCNC: 5.2 MMOL/L — HIGH (ref 3.5–5.1)
POTASSIUM BLDV-SCNC: 5.2 MMOL/L — HIGH (ref 3.5–5.1)
POTASSIUM SERPL-MCNC: 4.7 MMOL/L — SIGNIFICANT CHANGE UP (ref 3.5–5.3)
POTASSIUM SERPL-MCNC: 4.7 MMOL/L — SIGNIFICANT CHANGE UP (ref 3.5–5.3)
POTASSIUM SERPL-MCNC: 5.2 MMOL/L — SIGNIFICANT CHANGE UP (ref 3.5–5.3)
POTASSIUM SERPL-MCNC: 5.2 MMOL/L — SIGNIFICANT CHANGE UP (ref 3.5–5.3)
POTASSIUM SERPL-SCNC: 4.7 MMOL/L — SIGNIFICANT CHANGE UP (ref 3.5–5.3)
POTASSIUM SERPL-SCNC: 4.7 MMOL/L — SIGNIFICANT CHANGE UP (ref 3.5–5.3)
POTASSIUM SERPL-SCNC: 5.2 MMOL/L — SIGNIFICANT CHANGE UP (ref 3.5–5.3)
POTASSIUM SERPL-SCNC: 5.2 MMOL/L — SIGNIFICANT CHANGE UP (ref 3.5–5.3)
PROT SERPL-MCNC: 6.8 G/DL — SIGNIFICANT CHANGE UP (ref 6–8.3)
PROT SERPL-MCNC: 6.8 G/DL — SIGNIFICANT CHANGE UP (ref 6–8.3)
PROTHROM AB SERPL-ACNC: 12.2 SEC — SIGNIFICANT CHANGE UP (ref 9.5–13)
PROTHROM AB SERPL-ACNC: 12.2 SEC — SIGNIFICANT CHANGE UP (ref 9.5–13)
RBC # BLD: 3.94 M/UL — LOW (ref 4.2–5.8)
RBC # BLD: 3.94 M/UL — LOW (ref 4.2–5.8)
RBC # BLD: 4.38 M/UL — SIGNIFICANT CHANGE UP (ref 4.2–5.8)
RBC # BLD: 4.38 M/UL — SIGNIFICANT CHANGE UP (ref 4.2–5.8)
RBC # FLD: 15.7 % — HIGH (ref 10.3–14.5)
RBC # FLD: 15.7 % — HIGH (ref 10.3–14.5)
RBC # FLD: 15.9 % — HIGH (ref 10.3–14.5)
RBC # FLD: 15.9 % — HIGH (ref 10.3–14.5)
SAO2 % BLDV: 74.8 % — SIGNIFICANT CHANGE UP (ref 67–88)
SAO2 % BLDV: 74.8 % — SIGNIFICANT CHANGE UP (ref 67–88)
SAO2 % BLDV: 98.9 % — HIGH (ref 67–88)
SAO2 % BLDV: 98.9 % — HIGH (ref 67–88)
SODIUM SERPL-SCNC: 131 MMOL/L — LOW (ref 135–145)
SODIUM SERPL-SCNC: 131 MMOL/L — LOW (ref 135–145)
SODIUM SERPL-SCNC: 134 MMOL/L — LOW (ref 135–145)
SODIUM SERPL-SCNC: 134 MMOL/L — LOW (ref 135–145)
WBC # BLD: 7.95 K/UL — SIGNIFICANT CHANGE UP (ref 3.8–10.5)
WBC # BLD: 7.95 K/UL — SIGNIFICANT CHANGE UP (ref 3.8–10.5)
WBC # BLD: 8.6 K/UL — SIGNIFICANT CHANGE UP (ref 3.8–10.5)
WBC # BLD: 8.6 K/UL — SIGNIFICANT CHANGE UP (ref 3.8–10.5)
WBC # FLD AUTO: 7.95 K/UL — SIGNIFICANT CHANGE UP (ref 3.8–10.5)
WBC # FLD AUTO: 7.95 K/UL — SIGNIFICANT CHANGE UP (ref 3.8–10.5)
WBC # FLD AUTO: 8.6 K/UL — SIGNIFICANT CHANGE UP (ref 3.8–10.5)
WBC # FLD AUTO: 8.6 K/UL — SIGNIFICANT CHANGE UP (ref 3.8–10.5)

## 2023-11-04 PROCEDURE — 99232 SBSQ HOSP IP/OBS MODERATE 35: CPT

## 2023-11-04 PROCEDURE — 71045 X-RAY EXAM CHEST 1 VIEW: CPT | Mod: 26

## 2023-11-04 RX ORDER — WARFARIN SODIUM 2.5 MG/1
5 TABLET ORAL ONCE
Refills: 0 | Status: COMPLETED | OUTPATIENT
Start: 2023-11-04 | End: 2023-11-04

## 2023-11-04 RX ORDER — BUMETANIDE 0.25 MG/ML
1 INJECTION INTRAMUSCULAR; INTRAVENOUS ONCE
Refills: 0 | Status: COMPLETED | OUTPATIENT
Start: 2023-11-04 | End: 2023-11-04

## 2023-11-04 RX ADMIN — Medication 1: at 08:09

## 2023-11-04 RX ADMIN — ISOSORBIDE DINITRATE 20 MILLIGRAM(S): 5 TABLET ORAL at 11:27

## 2023-11-04 RX ADMIN — Medication 12.5 MILLIGRAM(S): at 05:39

## 2023-11-04 RX ADMIN — PANTOPRAZOLE SODIUM 40 MILLIGRAM(S): 20 TABLET, DELAYED RELEASE ORAL at 05:39

## 2023-11-04 RX ADMIN — INSULIN GLARGINE 30 UNIT(S): 100 INJECTION, SOLUTION SUBCUTANEOUS at 00:00

## 2023-11-04 RX ADMIN — Medication 12.5 MILLIGRAM(S): at 11:28

## 2023-11-04 RX ADMIN — TAMSULOSIN HYDROCHLORIDE 0.4 MILLIGRAM(S): 0.4 CAPSULE ORAL at 21:55

## 2023-11-04 RX ADMIN — ISOSORBIDE DINITRATE 20 MILLIGRAM(S): 5 TABLET ORAL at 18:37

## 2023-11-04 RX ADMIN — ISOSORBIDE DINITRATE 20 MILLIGRAM(S): 5 TABLET ORAL at 05:39

## 2023-11-04 RX ADMIN — WARFARIN SODIUM 5 MILLIGRAM(S): 2.5 TABLET ORAL at 21:54

## 2023-11-04 RX ADMIN — BUMETANIDE 2 MILLIGRAM(S): 0.25 INJECTION INTRAMUSCULAR; INTRAVENOUS at 05:38

## 2023-11-04 RX ADMIN — Medication 12 UNIT(S): at 08:08

## 2023-11-04 RX ADMIN — Medication 81 MILLIGRAM(S): at 11:27

## 2023-11-04 RX ADMIN — BUMETANIDE 1 MILLIGRAM(S): 0.25 INJECTION INTRAMUSCULAR; INTRAVENOUS at 12:45

## 2023-11-04 RX ADMIN — Medication 100 MILLIGRAM(S): at 05:38

## 2023-11-04 RX ADMIN — HEPARIN SODIUM 12.5 UNIT(S)/HR: 5000 INJECTION INTRAVENOUS; SUBCUTANEOUS at 07:58

## 2023-11-04 RX ADMIN — Medication 12 UNIT(S): at 12:45

## 2023-11-04 RX ADMIN — Medication 100 MILLIGRAM(S): at 21:54

## 2023-11-04 RX ADMIN — Medication 100 MILLIGRAM(S): at 14:30

## 2023-11-04 RX ADMIN — ATORVASTATIN CALCIUM 80 MILLIGRAM(S): 80 TABLET, FILM COATED ORAL at 21:53

## 2023-11-04 RX ADMIN — Medication 12.5 MILLIGRAM(S): at 18:36

## 2023-11-04 RX ADMIN — Medication 125 MICROGRAM(S): at 05:39

## 2023-11-04 RX ADMIN — Medication 12.5 MILLIGRAM(S): at 01:09

## 2023-11-04 RX ADMIN — SENNA PLUS 2 TABLET(S): 8.6 TABLET ORAL at 21:53

## 2023-11-04 RX ADMIN — INSULIN GLARGINE 30 UNIT(S): 100 INJECTION, SOLUTION SUBCUTANEOUS at 21:54

## 2023-11-04 NOTE — PROVIDER CONTACT NOTE (CHANGE IN STATUS NOTIFICATION) - ACTION/TREATMENT ORDERED:
Zofran ordered and given. Patient reports relief.
provider solidified, VBG ordered, pt is put on 4LNC.

## 2023-11-04 NOTE — PROGRESS NOTE ADULT - PROBLEM SELECTOR PLAN 6
Secondary to cardiorenal syndrome with component of ATN from cardiogenic shock, improving  - cont to monitor sCr daily  - avoid nephrotoxins

## 2023-11-04 NOTE — PROGRESS NOTE ADULT - SUBJECTIVE AND OBJECTIVE BOX
Neurology        S: patient seen and examined. MRI confirms embolic infarcts ; now on floor             Medications: MEDICATIONS  (STANDING):  aspirin enteric coated 81 milliGRAM(s) Oral daily  atorvastatin 80 milliGRAM(s) Oral at bedtime  buMETAnide 2 milliGRAM(s) Oral daily  digoxin     Tablet 125 MICROGram(s) Oral daily  heparin  Infusion 1250 Unit(s)/Hr (12.5 mL/Hr) IV Continuous <Continuous>  hydrALAZINE 100 milliGRAM(s) Oral every 8 hours  insulin glargine Injectable (LANTUS) 30 Unit(s) SubCutaneous at bedtime  insulin lispro (ADMELOG) corrective regimen sliding scale   SubCutaneous three times a day before meals  insulin lispro (ADMELOG) corrective regimen sliding scale   SubCutaneous at bedtime  insulin lispro Injectable (ADMELOG) 12 Unit(s) SubCutaneous three times a day before meals  isosorbide   dinitrate Tablet (ISORDIL) 20 milliGRAM(s) Oral three times a day  metoprolol tartrate 12.5 milliGRAM(s) Oral every 6 hours  ondansetron Injectable 4 milliGRAM(s) IV Push once  pantoprazole    Tablet 40 milliGRAM(s) Oral before breakfast  senna 2 Tablet(s) Oral at bedtime  tamsulosin 0.4 milliGRAM(s) Oral at bedtime    MEDICATIONS  (PRN):  benzocaine/menthol Lozenge 1 Lozenge Oral every 2 hours PRN Sore Throat  polyethylene glycol 3350 17 Gram(s) Oral daily PRN Constipation       Vitals:  Vital Signs Last 24 Hrs  T(C): 36.6 (04 Nov 2023 12:00), Max: 37 (03 Nov 2023 17:48)  T(F): 97.9 (04 Nov 2023 12:00), Max: 98.6 (03 Nov 2023 17:48)  HR: 108 (04 Nov 2023 12:00) (104 - 115)  BP: 100/67 (04 Nov 2023 12:00) (100/67 - 127/79)  BP(mean): 84 (03 Nov 2023 15:00) (82 - 84)  RR: 22 (04 Nov 2023 12:00) (18 - 22)  SpO2: 95% (04 Nov 2023 12:00) (94% - 100%)    Parameters below as of 04 Nov 2023 12:00  Patient On (Oxygen Delivery Method): nasal cannula, 3L               General Exam:   General Appearance: Appropriately dressed and in no acute distress       Head: Normocephalic, atraumatic and no dysmorphic features  Ear, Nose, and Throat: Moist mucous membranes  CVS: S1S2+  Resp: No SOB, no wheeze or rhonchi  GI: soft NT/ND  Extremities: No edema or cyanosis  Skin: No bruises or rashes     Neurological Exam:  Mental Status: Awake, alert and oriented x 2.  Able to follow simple verbal commands. Able to name and repeat. fluent speech. No obvious aphasia or dysarthria noted. poor insight. not understanding why he is in hospital   Cranial Nerves: PERRL, EOMI, VFFC, sensation V1-V3 intact,  no obvious facial asymmetry, equal elevation of palate, scm/trap 5/5, tongue is midline on protrusion. no obvious papilledema on fundoscopic exam. hearing is grossly intact.   Motor: Normal bulk, tone and strength throughout. Fine finger movements were intact and symmetric. no tremors or drift noted.    Sensation: Intact to light touch and pinprick throughout. no right/left confusion. no extinction to tactile on DSS. Romberg was negative.   Reflexes: 1+ throughout at biceps, brachioradialis, triceps, patellars and ankles bilaterally and equal. No clonus. R toe and L toe were both downgoing.  Coordination: No dysmetria on FNF or HKS  Gait:   no limitations in gait.     Data/Labs/Imaging which I personally reviewed.     Labs:     LABS:                          11.1   8.60  )-----------( 209      ( 04 Nov 2023 06:25 )             36.2     11-04    134<L>  |  96  |  45<H>  ----------------------------<  139<H>  4.7   |  20<L>  |  2.05<H>    Ca    9.5      04 Nov 2023 06:24  Phos  5.6     11-04  Mg     2.2     11-04    TPro  6.8  /  Alb  3.6  /  TBili  0.5  /  DBili  x   /  AST  21  /  ALT  43  /  AlkPhos  124<H>  11-04    LIVER FUNCTIONS - ( 04 Nov 2023 06:24 )  Alb: 3.6 g/dL / Pro: 6.8 g/dL / ALK PHOS: 124 U/L / ALT: 43 U/L / AST: 21 U/L / GGT: x           PT/INR - ( 04 Nov 2023 06:26 )   PT: 12.2 sec;   INR: 1.11 ratio         PTT - ( 04 Nov 2023 06:26 )  PTT:64.5 sec  Urinalysis Basic - ( 04 Nov 2023 06:24 )    Color: x / Appearance: x / SG: x / pH: x  Gluc: 139 mg/dL / Ketone: x  / Bili: x / Urobili: x   Blood: x / Protein: x / Nitrite: x   Leuk Esterase: x / RBC: x / WBC x   Sq Epi: x / Non Sq Epi: x / Bacteria: x            < from: CT Head No Cont (10.27.23 @ 18:04) >    ACC: [id number] EXAM:  CT BRAIN   ORDERED BY: BRETT HOPSON     PROCEDURE DATE:  10/27/2023          INTERPRETATION:  Clinical Indication: CVA    5mm axial sections of the brain were obtained from base to vertex,   without the intravenous administration of contrast material. Coronal and   sagittal computer generated reconstructed views are available.    No prior brain imaging is available for comparison.          The ventricles and sulci are prominent consistent with mild atrophy.   Small vesselwhite matter ischemic changes are noted. There is a infarct   in the right medial cerebellum of undetermined age which could be acute   to subacute based on its degree of lucency. There is no significant   hemorrhage. Bone window examination is unremarkable. There is been   previous right-sided lens replacement surgery.      IMPRESSION: Atrophy and small vessel white matter ischemic changes. Right   medial cerebellar infarct may be acute versus subacute. No hemorrhage.      --- End of Report ---    < from: MR Angio Head No Cont (10.30.23 @ 20:58) >    ACC: [id number] EXAM:  MR ANGIO BRAIN   ORDERED BY: NATALIE CARRANZA     ACC: [id number] EXAM:  MR ANGIO NECK   ORDERED BY: NATALIE CARRANZA     ACC: [id number] EXAM:  MR BRAIN   ORDERED BY: NAVNEET CORONADO     PROCEDURE DATE:  10/30/2023          INTERPRETATION:  .    CLINICAL INFORMATION: Stroke.    TECHNIQUE: Multiplanar multi sequential MRI examination of the brain was   performed without the administration of IV gadolinium. MRA images through   the neck and [place] of [place] were obtained usinga combination of 2-D   and 3-D time-of-flight acquisition. The data was then reformatted into a   volumetric data set and reviewed as rotational MIP images.    COMPARISON: Most recent prior CT examination of the head from 10/27/2023.   No prior MRI studies are available for comparison.    FINDINGS:    MRI Brain: A wedge-shaped area of diffusion restriction is seen within   the right PICA territory. Associated T2/FLAIR hyperintense signal is also   seen, compatible with cytotoxic edema. Mild hemorrhagic transformation is   seen within the infarct bed manifested by high signal on T1-weighted   imaging as well as susceptibility artifact on the SWI sequence. Mild mass   effect is seen without effacement of the fourth ventricle or shift of the   posterior fossa midline structures.    This is superimposed upon encephalomalacia and gliosis involving the   right cerebellar hemisphere.    An additional vague area of diffusion reduction is seen within the left   parietal periventricular white matter without associated T2/FLAIR   hyperintense signal, compatible with cytotoxic edema. No gross   hemorrhagic transformation is noted in this location.    Scattered foci of susceptibility artifact are seen within the bilateral   basal ganglia, compatible with areas of chronic microhemorrhage.    Multiple additional patchy confluent nonspecific T2/FLAIR hyperintense   signal changes are noted throughout the bihemispheric white matter   without associated mass effect or restricted diffusion.    Ventricular size and configuration is unremarkable. No abnormal   extra-axial fluid collections are seen. Flow-voids are noted throughout   the major intracranial vessels, on the T2 weighted images, consistent   with their patency. The sellar location appears unremarkable. There is an   unchanged appearing small retrocerebellar arachnoid cyst versus cisterna   magna.    A polyp versus retention cyst is seen within the right maxillary sinus.   Additional minor scattered mucosal thickening seen throughout the ethmoid   labyrinth. The tympanomastoid cavities are clear. The left orbit appears   within normal limits. There is evidence of right-sided cataract removal.    There is an indeterminate mixed signal ovoid shaped soft tissue focus   involving the left superior pinna measuring 1.4 x 0.9 cm which appears   unchanged.    MRA Neck: Examination is motion limited.    There is a standard anatomic variation to the aortic arch. The origins of   the great vessels appear grossly unremarkable.    The bilateral common carotid arteries, carotid bifurcations and cervical   internal carotid arteries appear patent. The origin of the left vertebral   artery is normal. The left vertebral artery is patent.    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    MRA [place] of [place]: Atherosclerosis affects the bilateral carotid   siphons with mild area of focal stenosis involving the right   clinoid/supraclinoid junction. There is mild hypoplasia of the right A1   segment. Otherwise, the bilateral intracranial internal carotid,   anterior, and middle cerebral arteries appear unremarkable.    The anterior and bilateral posterior communicating arteries are seen.    The right V4 segment does not demonstrate flow relatedsignal. The left   V4 segment appears unremarkable. The vertebrobasilar confluence appears   unremarkable. Long segment mild stenosis involving the mid basilar   artery. The basilar tip appears unremarkable as well as the bilateral   posterior cerebral arteries.    IMPRESSION:    MRI BRAIN:  1. Evolving acute/subacute right-sided PICA distribution infarction with   associated cytotoxic edema and very mild hemorrhagic transformation.  2. Additional wedge-shaped area of acute/subacute ischemia within the   left parietal periatrial white matter with associated cytotoxic edema.  3. Indeterminate soft tissue lesion involving the left superior pinna   measuring 1.4 x 0.9 cm. Neoplasm cannot be excluded. Recommend   correlation with direct physical examination and visualization.    MRA NECK:  1. Extremely motion limited study.  2. 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 Recommend further evaluation with a CT angiogram study   of the neck.    MRA HEAD:  1. 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 This can be further evaluated with a CT angiogram study of the   head.  2. Mild focal stenosis of the right supraclinoid intracranial internal   carotid artery secondary to atherosclerosis.  3. Otherwise, no large vessel occlusion or major stenosis.    --- End of Report ---            LAKESHA DIAZ MD; Attending Radiologist  This document has been electronically signed. Oct 30 2023  9:20PM    < end of copied text >

## 2023-11-04 NOTE — PROGRESS NOTE ADULT - ASSESSMENT
59 yo Male with prior Stroke with mild ?left sided deficits (improved), HTN, DM2, vertigo, HLD, Mobitz II s/pp Medtronic dual chamber ICD (12/21/22) initially presented to [facility] from home with complaints of dyspnea and chest pain and was admitted w/ acute hypoxic respiratory failure likely due to acute pulmonary edema due to acute HFrEF in setting of acute NSTEMI, LAURA and enterovirus infection. Pt with brief MICU stay at [facility] requiring bipap (no intubation), was treated for PNA with cefepime, and was found to have TTE done 9/26/23 showing EF 20% with severely decreased LVSF, multiple regional wall motion abnormalities, and elevated LV end diastolic pressure. Pt was transferred to [facility] for cardiac eval.   TTE EF < 20%  [facility] 10/19: RA 10, RV 47/9/13, Wedge 29, PA 41/26/33, CO/CI 4.4/2.3, PA sat 57.3  EKG: sinus tachycardia, septal q-waves, left axis deviation   TTE 10/16/23: LV 5.5 cm, LVEF 20% with regional WMAs worse in the apex, LVOT VTI 8 cm, normal RV size/function, severe functional MR, small pericardial effusion, estimated RA pressure 8 mmHg   TTE 12/2022: normal LVEF   LHC: 95% discrete proximal LAD disease and sequential multifocal disease with good distal target, 80-90% proximal LCx disease just prior to marginals, severe multifocal RCA disease with good targets   A1c 8.4    CD 10/17 neg   NIHSS 1  CTH with age indeterminate R cerebellar infarct.  likely chronic   MRi brain with eovlving acute/subacute R PICA infarct with mild edema and mild hemorrhagic transformation. 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  occluded R VA.   CTH stable     Impression:   R cerebellar/PICA infarct as well as L parietal embolic infarct, likely cardioembolic       - f/u heart failure team   - CTS eval for CABG given 3 vessel disease vs high risk PCI --> not a CABG candidate   - c/w asa and statin therpay for secondary stroke prevention.   - no objection to heparin drip for low EF and LV thrombus  --> no objection to transition to coumadin ; hep to coumadin bridge   - called for risk stratification for heart transplant eval,  low-mod risk from stroke standpoint and no objection   - b12, TSH, RPR for reversible causes of dementia   - telemetry  - PT/OT   - check FS, glucose control <180  - GI/DVT ppx   - Thank you for allowing me to participate in the care of this patient. Call with questions.   spoke with primary team     Abelardo Irving MD  Vascular Neurology  Office: [phone]

## 2023-11-04 NOTE — PROGRESS NOTE ADULT - PROBLEM SELECTOR PLAN 3
TTE 10/26: EF <20 %, LV apical thrombus is seen. Small pericardial effusion noted adjacent to the right ventricle with no evidence of hemodynamic compromise.  - cont hep to coumadin bridge, started 11/3, monitor INR daily

## 2023-11-04 NOTE — PROGRESS NOTE ADULT - SUBJECTIVE AND OBJECTIVE BOX
[facility] Division of Hospital Medicine  Marine Hamilton MD  AVailable on TEAMS 8a-8p    Patient is a 58y old  Male who presents with a chief complaint of NSTEMI (04 Nov 2023 07:28)      SUBJECTIVE / OVERNIGHT EVENTS: No acute events. Still reports SOB but slightly improved  ADDITIONAL REVIEW OF SYSTEMS: negative    MEDICATIONS  (STANDING):  aspirin enteric coated 81 milliGRAM(s) Oral daily  atorvastatin 80 milliGRAM(s) Oral at bedtime  buMETAnide 2 milliGRAM(s) Oral daily  digoxin     Tablet 125 MICROGram(s) Oral daily  heparin  Infusion 1250 Unit(s)/Hr (12.5 mL/Hr) IV Continuous <Continuous>  hydrALAZINE 100 milliGRAM(s) Oral every 8 hours  insulin glargine Injectable (LANTUS) 30 Unit(s) SubCutaneous at bedtime  insulin lispro (ADMELOG) corrective regimen sliding scale   SubCutaneous three times a day before meals  insulin lispro (ADMELOG) corrective regimen sliding scale   SubCutaneous at bedtime  insulin lispro Injectable (ADMELOG) 12 Unit(s) SubCutaneous three times a day before meals  isosorbide   dinitrate Tablet (ISORDIL) 20 milliGRAM(s) Oral three times a day  metoprolol tartrate 12.5 milliGRAM(s) Oral every 6 hours  ondansetron Injectable 4 milliGRAM(s) IV Push once  pantoprazole    Tablet 40 milliGRAM(s) Oral before breakfast  senna 2 Tablet(s) Oral at bedtime  tamsulosin 0.4 milliGRAM(s) Oral at bedtime    MEDICATIONS  (PRN):  benzocaine/menthol Lozenge 1 Lozenge Oral every 2 hours PRN Sore Throat  polyethylene glycol 3350 17 Gram(s) Oral daily PRN Constipation      CAPILLARY BLOOD GLUCOSE      POCT Blood Glucose.: 133 mg/dL (04 Nov 2023 12:50)  POCT Blood Glucose.: 143 mg/dL (04 Nov 2023 11:31)  POCT Blood Glucose.: 180 mg/dL (04 Nov 2023 08:06)  POCT Blood Glucose.: 142 mg/dL (04 Nov 2023 05:21)  POCT Blood Glucose.: 145 mg/dL (03 Nov 2023 23:48)  POCT Blood Glucose.: 89 mg/dL (03 Nov 2023 22:53)  POCT Blood Glucose.: 80 mg/dL (03 Nov 2023 21:21)  POCT Blood Glucose.: 212 mg/dL (03 Nov 2023 16:46)    I&O's Summary    03 Nov 2023 07:01  -  04 Nov 2023 07:00  --------------------------------------------------------  IN: 950 mL / OUT: 425 mL / NET: 525 mL    04 Nov 2023 08:01  -  04 Nov 2023 15:33  --------------------------------------------------------  IN: 120 mL / OUT: 0 mL / NET: 120 mL        PHYSICAL EXAM:  Vital Signs Last 24 Hrs  T(C): 36.6 (04 Nov 2023 12:00), Max: 37 (03 Nov 2023 17:48)  T(F): 97.9 (04 Nov 2023 12:00), Max: 98.6 (03 Nov 2023 17:48)  HR: 108 (04 Nov 2023 12:00) (104 - 114)  BP: 100/67 (04 Nov 2023 12:00) (100/67 - 127/79)  BP(mean): --  RR: 22 (04 Nov 2023 12:00) (18 - 22)  SpO2: 95% (04 Nov 2023 12:00) (94% - 97%)    Parameters below as of 04 Nov 2023 12:00  Patient On (Oxygen Delivery Method): nasal cannula, 3L      CONSTITUTIONAL: NAD, more alert today  EYES: PERRLA; conjunctiva and sclera clear  ENMT: Moist oral mucosa  NECK: Supple, no palpable masses  RESPIRATORY: Mild basilar crackles  CARDIOVASCULAR: Regular rate and rhythm, normal S1 and S2, no murmur/rub/gallop; No lower extremity edema  ABDOMEN: Nontender to palpation, normoactive bowel sounds, no rebound/guarding  MUSCULOSKELETAL:  No clubbing or cyanosis of digits; no joint swelling or tenderness to palpation  PSYCH: A+O to person, place. Not oriented to situation. Follows commands  NEUROLOGY: CN 2-12 are intact and symmetric; no gross sensory deficits   SKIN: No rashes; no palpable lesions    LABS: reviewed                        11.1   8.60  )-----------( 209      ( 04 Nov 2023 06:25 )             36.2     11-04    134<L>  |  96  |  45<H>  ----------------------------<  139<H>  4.7   |  20<L>  |  2.05<H>    Ca    9.5      04 Nov 2023 06:24  Phos  5.6     11-04  Mg     2.2     11-04    TPro  6.8  /  Alb  3.6  /  TBili  0.5  /  DBili  x   /  AST  21  /  ALT  43  /  AlkPhos  124<H>  11-04    PT/INR - ( 04 Nov 2023 06:26 )   PT: 12.2 sec;   INR: 1.11 ratio         PTT - ( 04 Nov 2023 06:26 )  PTT:64.5 sec      Urinalysis Basic - ( 04 Nov 2023 06:24 )    Color: x / Appearance: x / SG: x / pH: x  Gluc: 139 mg/dL / Ketone: x  / Bili: x / Urobili: x   Blood: x / Protein: x / Nitrite: x   Leuk Esterase: x / RBC: x / WBC x   Sq Epi: x / Non Sq Epi: x / Bacteria: x    Blood Gas Profile - Venous (11.04.23 @ 13:04)    pH, Venous: 7.29: No collection time indicated, please interpret with caution   pCO2, Venous: 53 mmHg   pO2, Venous: 51 mmHg   HCO3, Venous: 26 mmol/L   Base Excess, Venous: -1.6 mmol/L   Oxygen Saturation, Venous: 74.8 %   Total CO2, Venous: 27 mmol/L   Blood Gas Source Venous: Venous        < from: Xray Chest 1 View- PORTABLE-Routine (Xray Chest 1 View- PORTABLE-Routine in AM.) (11.04.23 @ 09:19) >  FINDINGS:  Left cardiac pacing device with stable lead positioning.  Difficult to assess heart size on this projection.  Diffuse right lung infiltrates are stable. Minimal improvement in left   lung infiltrates..  Stable small bilateral pleural effusions.  No pneumothorax.    IMPRESSION:  Minimal improvement in left lung infiltrates.    < end of copied text >

## 2023-11-04 NOTE — PROGRESS NOTE ADULT - NSPROGADDITIONALINFOA_GEN_ALL_CORE
Incidental findings:  CT chest: 3. 3 x 1.8 cm left supraclavicular nodule likely arising from the thyroid gland   -thyroid US outpatient    41 minutes spent on total encounter. The necessity of the time spent during the encounter on this date of service was due to:     - preparing to see the patient (eg, review of tests, documents)  - performing a medically appropriate examination and/or evaluation  - referring and communicating with other health care professionals  - documenting clinical information in the electronic or other health record  - care coordination.

## 2023-11-04 NOTE — PROGRESS NOTE ADULT - PROBLEM SELECTOR PLAN 1
Hypoxemia likely 2/2 cardiogenic pulm edema  - Given infiltrates on CXR, will obtain CT chest non con, r/o PNA  - RVP negative

## 2023-11-04 NOTE — PROGRESS NOTE ADULT - ASSESSMENT
58M with PMH of CVA with mild left sided deficits, HTN, DM2, vertigo, HLD, Mobitz II s/p Medtronic dual chamber ICD (12/21/22) initially presented to OSH for SOB c/f ADHF vs. PNA, later found to have NSTEMI transferred to [facility] for LHC evaluation. 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 Admitted to CICU for hypotension, inotropic support, hemodynamic monitoring. Previously downgraded on 10/22, RRT called while on the floor for AMS and hypotensive to 80/40, CTH 10/27 found possible R cerebellar infarct, s/p re-transfer to CICU for inotropic support. Pt now stabilized and transferred back to medicine

## 2023-11-05 LAB
ANION GAP SERPL CALC-SCNC: 13 MMOL/L — SIGNIFICANT CHANGE UP (ref 5–17)
ANION GAP SERPL CALC-SCNC: 13 MMOL/L — SIGNIFICANT CHANGE UP (ref 5–17)
APTT BLD: 80.9 SEC — HIGH (ref 24.5–35.6)
APTT BLD: 80.9 SEC — HIGH (ref 24.5–35.6)
BUN SERPL-MCNC: 50 MG/DL — HIGH (ref 7–23)
BUN SERPL-MCNC: 50 MG/DL — HIGH (ref 7–23)
CALCIUM SERPL-MCNC: 9.1 MG/DL — SIGNIFICANT CHANGE UP (ref 8.4–10.5)
CALCIUM SERPL-MCNC: 9.1 MG/DL — SIGNIFICANT CHANGE UP (ref 8.4–10.5)
CHLORIDE SERPL-SCNC: 97 MMOL/L — SIGNIFICANT CHANGE UP (ref 96–108)
CHLORIDE SERPL-SCNC: 97 MMOL/L — SIGNIFICANT CHANGE UP (ref 96–108)
CO2 SERPL-SCNC: 22 MMOL/L — SIGNIFICANT CHANGE UP (ref 22–31)
CO2 SERPL-SCNC: 22 MMOL/L — SIGNIFICANT CHANGE UP (ref 22–31)
CREAT SERPL-MCNC: 2.07 MG/DL — HIGH (ref 0.5–1.3)
CREAT SERPL-MCNC: 2.07 MG/DL — HIGH (ref 0.5–1.3)
EGFR: 37 ML/MIN/1.73M2 — LOW
EGFR: 37 ML/MIN/1.73M2 — LOW
GLUCOSE BLDC GLUCOMTR-MCNC: 111 MG/DL — HIGH (ref 70–99)
GLUCOSE BLDC GLUCOMTR-MCNC: 158 MG/DL — HIGH (ref 70–99)
GLUCOSE BLDC GLUCOMTR-MCNC: 158 MG/DL — HIGH (ref 70–99)
GLUCOSE BLDC GLUCOMTR-MCNC: 192 MG/DL — HIGH (ref 70–99)
GLUCOSE BLDC GLUCOMTR-MCNC: 192 MG/DL — HIGH (ref 70–99)
GLUCOSE BLDC GLUCOMTR-MCNC: 43 MG/DL — CRITICAL LOW (ref 70–99)
GLUCOSE BLDC GLUCOMTR-MCNC: 72 MG/DL — SIGNIFICANT CHANGE UP (ref 70–99)
GLUCOSE BLDC GLUCOMTR-MCNC: 72 MG/DL — SIGNIFICANT CHANGE UP (ref 70–99)
GLUCOSE BLDC GLUCOMTR-MCNC: 76 MG/DL — SIGNIFICANT CHANGE UP (ref 70–99)
GLUCOSE BLDC GLUCOMTR-MCNC: 76 MG/DL — SIGNIFICANT CHANGE UP (ref 70–99)
GLUCOSE BLDC GLUCOMTR-MCNC: 87 MG/DL — SIGNIFICANT CHANGE UP (ref 70–99)
GLUCOSE BLDC GLUCOMTR-MCNC: 87 MG/DL — SIGNIFICANT CHANGE UP (ref 70–99)
GLUCOSE SERPL-MCNC: 190 MG/DL — HIGH (ref 70–99)
GLUCOSE SERPL-MCNC: 190 MG/DL — HIGH (ref 70–99)
HCT VFR BLD CALC: 34.2 % — LOW (ref 39–50)
HCT VFR BLD CALC: 34.2 % — LOW (ref 39–50)
HGB BLD-MCNC: 10.5 G/DL — LOW (ref 13–17)
HGB BLD-MCNC: 10.5 G/DL — LOW (ref 13–17)
INR BLD: 1.3 RATIO — HIGH (ref 0.85–1.18)
INR BLD: 1.3 RATIO — HIGH (ref 0.85–1.18)
MAGNESIUM SERPL-MCNC: 2.1 MG/DL — SIGNIFICANT CHANGE UP (ref 1.6–2.6)
MAGNESIUM SERPL-MCNC: 2.1 MG/DL — SIGNIFICANT CHANGE UP (ref 1.6–2.6)
MCHC RBC-ENTMCNC: 25.4 PG — LOW (ref 27–34)
MCHC RBC-ENTMCNC: 25.4 PG — LOW (ref 27–34)
MCHC RBC-ENTMCNC: 30.7 GM/DL — LOW (ref 32–36)
MCHC RBC-ENTMCNC: 30.7 GM/DL — LOW (ref 32–36)
MCV RBC AUTO: 82.6 FL — SIGNIFICANT CHANGE UP (ref 80–100)
MCV RBC AUTO: 82.6 FL — SIGNIFICANT CHANGE UP (ref 80–100)
NRBC # BLD: 0 /100 WBCS — SIGNIFICANT CHANGE UP (ref 0–0)
NRBC # BLD: 0 /100 WBCS — SIGNIFICANT CHANGE UP (ref 0–0)
PHOSPHATE SERPL-MCNC: 5.5 MG/DL — HIGH (ref 2.5–4.5)
PHOSPHATE SERPL-MCNC: 5.5 MG/DL — HIGH (ref 2.5–4.5)
PLATELET # BLD AUTO: 212 K/UL — SIGNIFICANT CHANGE UP (ref 150–400)
PLATELET # BLD AUTO: 212 K/UL — SIGNIFICANT CHANGE UP (ref 150–400)
POTASSIUM SERPL-MCNC: 4.6 MMOL/L — SIGNIFICANT CHANGE UP (ref 3.5–5.3)
POTASSIUM SERPL-MCNC: 4.6 MMOL/L — SIGNIFICANT CHANGE UP (ref 3.5–5.3)
POTASSIUM SERPL-SCNC: 4.6 MMOL/L — SIGNIFICANT CHANGE UP (ref 3.5–5.3)
POTASSIUM SERPL-SCNC: 4.6 MMOL/L — SIGNIFICANT CHANGE UP (ref 3.5–5.3)
PROTHROM AB SERPL-ACNC: 14.2 SEC — HIGH (ref 9.5–13)
PROTHROM AB SERPL-ACNC: 14.2 SEC — HIGH (ref 9.5–13)
RBC # BLD: 4.14 M/UL — LOW (ref 4.2–5.8)
RBC # BLD: 4.14 M/UL — LOW (ref 4.2–5.8)
RBC # FLD: 15.8 % — HIGH (ref 10.3–14.5)
RBC # FLD: 15.8 % — HIGH (ref 10.3–14.5)
SODIUM SERPL-SCNC: 132 MMOL/L — LOW (ref 135–145)
SODIUM SERPL-SCNC: 132 MMOL/L — LOW (ref 135–145)
WBC # BLD: 7.73 K/UL — SIGNIFICANT CHANGE UP (ref 3.8–10.5)
WBC # BLD: 7.73 K/UL — SIGNIFICANT CHANGE UP (ref 3.8–10.5)
WBC # FLD AUTO: 7.73 K/UL — SIGNIFICANT CHANGE UP (ref 3.8–10.5)
WBC # FLD AUTO: 7.73 K/UL — SIGNIFICANT CHANGE UP (ref 3.8–10.5)

## 2023-11-05 PROCEDURE — 99232 SBSQ HOSP IP/OBS MODERATE 35: CPT

## 2023-11-05 PROCEDURE — 71250 CT THORAX DX C-: CPT | Mod: 26

## 2023-11-05 RX ORDER — HYDRALAZINE HCL 50 MG
50 TABLET ORAL
Refills: 0 | Status: DISCONTINUED | OUTPATIENT
Start: 2023-11-05 | End: 2023-11-07

## 2023-11-05 RX ORDER — WARFARIN SODIUM 2.5 MG/1
5 TABLET ORAL ONCE
Refills: 0 | Status: COMPLETED | OUTPATIENT
Start: 2023-11-05 | End: 2023-11-05

## 2023-11-05 RX ADMIN — Medication 12 UNIT(S): at 08:47

## 2023-11-05 RX ADMIN — HEPARIN SODIUM 12.5 UNIT(S)/HR: 5000 INJECTION INTRAVENOUS; SUBCUTANEOUS at 10:11

## 2023-11-05 RX ADMIN — Medication 12 UNIT(S): at 16:58

## 2023-11-05 RX ADMIN — BUMETANIDE 2 MILLIGRAM(S): 0.25 INJECTION INTRAMUSCULAR; INTRAVENOUS at 05:50

## 2023-11-05 RX ADMIN — Medication 50 MILLIGRAM(S): at 18:53

## 2023-11-05 RX ADMIN — Medication 12.5 MILLIGRAM(S): at 12:24

## 2023-11-05 RX ADMIN — ATORVASTATIN CALCIUM 80 MILLIGRAM(S): 80 TABLET, FILM COATED ORAL at 22:31

## 2023-11-05 RX ADMIN — Medication 12.5 MILLIGRAM(S): at 01:12

## 2023-11-05 RX ADMIN — Medication 100 MILLIGRAM(S): at 08:48

## 2023-11-05 RX ADMIN — TAMSULOSIN HYDROCHLORIDE 0.4 MILLIGRAM(S): 0.4 CAPSULE ORAL at 22:31

## 2023-11-05 RX ADMIN — Medication 12.5 MILLIGRAM(S): at 05:50

## 2023-11-05 RX ADMIN — Medication 125 MICROGRAM(S): at 05:50

## 2023-11-05 RX ADMIN — ISOSORBIDE DINITRATE 20 MILLIGRAM(S): 5 TABLET ORAL at 17:05

## 2023-11-05 RX ADMIN — PANTOPRAZOLE SODIUM 40 MILLIGRAM(S): 20 TABLET, DELAYED RELEASE ORAL at 05:51

## 2023-11-05 RX ADMIN — WARFARIN SODIUM 5 MILLIGRAM(S): 2.5 TABLET ORAL at 22:35

## 2023-11-05 RX ADMIN — ISOSORBIDE DINITRATE 20 MILLIGRAM(S): 5 TABLET ORAL at 05:50

## 2023-11-05 RX ADMIN — Medication 81 MILLIGRAM(S): at 12:24

## 2023-11-05 RX ADMIN — ISOSORBIDE DINITRATE 20 MILLIGRAM(S): 5 TABLET ORAL at 13:53

## 2023-11-05 RX ADMIN — HEPARIN SODIUM 12.5 UNIT(S)/HR: 5000 INJECTION INTRAVENOUS; SUBCUTANEOUS at 08:45

## 2023-11-05 RX ADMIN — SENNA PLUS 2 TABLET(S): 8.6 TABLET ORAL at 22:30

## 2023-11-05 RX ADMIN — Medication 1: at 08:47

## 2023-11-05 RX ADMIN — INSULIN GLARGINE 30 UNIT(S): 100 INJECTION, SOLUTION SUBCUTANEOUS at 22:30

## 2023-11-05 RX ADMIN — Medication 12.5 MILLIGRAM(S): at 18:54

## 2023-11-05 RX ADMIN — Medication 12 UNIT(S): at 12:28

## 2023-11-05 NOTE — PROGRESS NOTE ADULT - PROBLEM SELECTOR PLAN 2
complicated by cardiogenic shock, acute systolic HF, s/p CCU stay and inotropiic support, now weaned off milrinone 10/28 -11/1  TTE 10/16: EF 20%, severely reduced LVSF  RHC 10/19: RA 10, PCWP 20, CO 4.4, CI 2.3  cMR 10/18: limited viability in LAD territory  R groin swan placed 10/30: RA 14/13/12, RV 51/14, PA 47/34/39, PCW 34/36/32, CI 2.5/CO 4.95, removed 11/2  - trend lactate daily  - c/w bumex 2 mg PO qd  - Appreciate ongoing HF recs  - GDMT: hold Entresto i/s/o hypotension, spironolactone due to LAURA, c/w metoprolol 12.5 q6 uptitrate as recommended by HF and farxiga prior to DC, lipitor 80mg  - cont hydral decreased from 100TID to 50BID due to borderline BPs  - s/p dig load, dig level 1.4, acceptable per HF   - f/u device interrogation  - avoid BB/CCB given borderline cardiac function  - strict Is/Os, daily weights  - HF holding off on launching VAD/transplant eval given cognitive issues however will continue to reassess.  - CXR 11/2 with stable b/l pleural effusions, mild pulmonary edema: to receive additional dose bumex IV X 1 today

## 2023-11-05 NOTE — PROGRESS NOTE ADULT - ASSESSMENT
57 yo Male with prior Stroke with mild ?left sided deficits (improved), HTN, DM2, vertigo, HLD, Mobitz II s/pp Medtronic dual chamber ICD (12/21/22) initially presented to [facility] from home with complaints of dyspnea and chest pain and was admitted w/ acute hypoxic respiratory failure likely due to acute pulmonary edema due to acute HFrEF in setting of acute NSTEMI, LAURA and enterovirus infection. Pt with brief MICU stay at [facility] requiring bipap (no intubation), was treated for PNA with cefepime, and was found to have TTE done 9/26/23 showing EF 20% with severely decreased LVSF, multiple regional wall motion abnormalities, and elevated LV end diastolic pressure. Pt was transferred to [facility] for cardiac eval.   TTE EF < 20%  [facility] 10/19: RA 10, RV 47/9/13, Wedge 29, PA 41/26/33, CO/CI 4.4/2.3, PA sat 57.3  EKG: sinus tachycardia, septal q-waves, left axis deviation   TTE 10/16/23: LV 5.5 cm, LVEF 20% with regional WMAs worse in the apex, LVOT VTI 8 cm, normal RV size/function, severe functional MR, small pericardial effusion, estimated RA pressure 8 mmHg   TTE 12/2022: normal LVEF   LHC: 95% discrete proximal LAD disease and sequential multifocal disease with good distal target, 80-90% proximal LCx disease just prior to marginals, severe multifocal RCA disease with good targets   A1c 8.4    CD 10/17 neg   NIHSS 1  CTH with age indeterminate R cerebellar infarct.  likely chronic   MRi brain with eovlving acute/subacute R PICA infarct with mild edema and mild hemorrhagic transformation. 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  occluded R VA.   CTH stable     Impression:   R cerebellar/PICA infarct as well as L parietal embolic infarct, likely cardioembolic       - c/o SOB ; f/u cardio and pulmo   - f/u heart failure team   - CTS eval for CABG given 3 vessel disease vs high risk PCI --> not a CABG candidate   - c/w asa and statin therpay for secondary stroke prevention.   - no objection to heparin drip for low EF and LV thrombus  --> no objection to transition to coumadin ; hep to coumadin bridge   - called for risk stratification for heart transplant eval,  low-mod risk from stroke standpoint and no objection   - b12, TSH, RPR for reversible causes of dementia   - telemetry  - PT/OT   - check FS, glucose control <180  - GI/DVT ppx   - Thank you for allowing me to participate in the care of this patient. Call with questions.   spoke with primary team     Abelardo Irving MD  Vascular Neurology  Office: [phone]

## 2023-11-05 NOTE — PROGRESS NOTE ADULT - SUBJECTIVE AND OBJECTIVE BOX
Neurology        S: patient seen and examined. MRI confirms embolic infarcts ; c/o SOB           Medications: MEDICATIONS  (STANDING):  aspirin enteric coated 81 milliGRAM(s) Oral daily  atorvastatin 80 milliGRAM(s) Oral at bedtime  buMETAnide 2 milliGRAM(s) Oral daily  digoxin     Tablet 125 MICROGram(s) Oral daily  heparin  Infusion 1250 Unit(s)/Hr (12.5 mL/Hr) IV Continuous <Continuous>  hydrALAZINE 100 milliGRAM(s) Oral every 8 hours  insulin glargine Injectable (LANTUS) 30 Unit(s) SubCutaneous at bedtime  insulin lispro (ADMELOG) corrective regimen sliding scale   SubCutaneous three times a day before meals  insulin lispro (ADMELOG) corrective regimen sliding scale   SubCutaneous at bedtime  insulin lispro Injectable (ADMELOG) 12 Unit(s) SubCutaneous three times a day before meals  isosorbide   dinitrate Tablet (ISORDIL) 20 milliGRAM(s) Oral three times a day  metoprolol tartrate 12.5 milliGRAM(s) Oral every 6 hours  ondansetron Injectable 4 milliGRAM(s) IV Push once  pantoprazole    Tablet 40 milliGRAM(s) Oral before breakfast  senna 2 Tablet(s) Oral at bedtime  tamsulosin 0.4 milliGRAM(s) Oral at bedtime    MEDICATIONS  (PRN):  benzocaine/menthol Lozenge 1 Lozenge Oral every 2 hours PRN Sore Throat  polyethylene glycol 3350 17 Gram(s) Oral daily PRN Constipation       Vitals:  Vital Signs Last 24 Hrs  T(C): 36.9 (05 Nov 2023 08:47), Max: 36.9 (05 Nov 2023 08:47)  T(F): 98.4 (05 Nov 2023 08:47), Max: 98.4 (05 Nov 2023 08:47)  HR: 104 (05 Nov 2023 08:47) (85 - 109)  BP: 103/67 (05 Nov 2023 08:47) (94/61 - 111/66)  BP(mean): --  RR: 18 (05 Nov 2023 08:47) (18 - 22)  SpO2: 98% (05 Nov 2023 08:47) (93% - 98%)    Parameters below as of 05 Nov 2023 08:47  Patient On (Oxygen Delivery Method): room air               General Exam:   General Appearance: Appropriately dressed and in no acute distress       Head: Normocephalic, atraumatic and no dysmorphic features  Ear, Nose, and Throat: Moist mucous membranes  CVS: S1S2+  Resp: No SOB, no wheeze or rhonchi  GI: soft NT/ND  Extremities: No edema or cyanosis  Skin: No bruises or rashes     Neurological Exam:  Mental Status: Awake, alert and oriented x 2.  Able to follow simple verbal commands. Able to name and repeat. fluent speech. No obvious aphasia or dysarthria noted. poor insight. not understanding why he is in hospital   Cranial Nerves: PERRL, EOMI, VFFC, sensation V1-V3 intact,  no obvious facial asymmetry, equal elevation of palate, scm/trap 5/5, tongue is midline on protrusion. no obvious papilledema on fundoscopic exam. hearing is grossly intact.   Motor: Normal bulk, tone and strength throughout. Fine finger movements were intact and symmetric. no tremors or drift noted.    Sensation: Intact to light touch and pinprick throughout. no right/left confusion. no extinction to tactile on DSS. Romberg was negative.   Reflexes: 1+ throughout at biceps, brachioradialis, triceps, patellars and ankles bilaterally and equal. No clonus. R toe and L toe were both downgoing.  Coordination: No dysmetria on FNF or HKS  Gait:   no limitations in gait.     Data/Labs/Imaging which I personally reviewed.       LABS:                          10.0   7.95  )-----------( 182      ( 04 Nov 2023 22:12 )             32.4     11-05    132<L>  |  97  |  50<H>  ----------------------------<  190<H>  4.6   |  22  |  2.07<H>    Ca    9.1      05 Nov 2023 07:28  Phos  5.5     11-05  Mg     2.1     11-05    TPro  6.8  /  Alb  3.6  /  TBili  0.5  /  DBili  x   /  AST  21  /  ALT  43  /  AlkPhos  124<H>  11-04    LIVER FUNCTIONS - ( 04 Nov 2023 06:24 )  Alb: 3.6 g/dL / Pro: 6.8 g/dL / ALK PHOS: 124 U/L / ALT: 43 U/L / AST: 21 U/L / GGT: x           PT/INR - ( 04 Nov 2023 06:26 )   PT: 12.2 sec;   INR: 1.11 ratio         PTT - ( 04 Nov 2023 06:26 )  PTT:64.5 sec  Urinalysis Basic - ( 05 Nov 2023 07:28 )    Color: x / Appearance: x / SG: x / pH: x  Gluc: 190 mg/dL / Ketone: x  / Bili: x / Urobili: x   Blood: x / Protein: x / Nitrite: x   Leuk Esterase: x / RBC: x / WBC x   Sq Epi: x / Non Sq Epi: x / Bacteria: x           < from: CT Head No Cont (10.27.23 @ 18:04) >    ACC: [id number] EXAM:  CT BRAIN   ORDERED BY: BRETT HOPSON     PROCEDURE DATE:  10/27/2023          INTERPRETATION:  Clinical Indication: CVA    5mm axial sections of the brain were obtained from base to vertex,   without the intravenous administration of contrast material. Coronal and   sagittal computer generated reconstructed views are available.    No prior brain imaging is available for comparison.          The ventricles and sulci are prominent consistent with mild atrophy.   Small vesselwhite matter ischemic changes are noted. There is a infarct   in the right medial cerebellum of undetermined age which could be acute   to subacute based on its degree of lucency. There is no significant   hemorrhage. Bone window examination is unremarkable. There is been   previous right-sided lens replacement surgery.      IMPRESSION: Atrophy and small vessel white matter ischemic changes. Right   medial cerebellar infarct may be acute versus subacute. No hemorrhage.      --- End of Report ---    < from: MR Angio Head No Cont (10.30.23 @ 20:58) >    ACC: [id number] EXAM:  MR ANGIO BRAIN   ORDERED BY: NATALIE CARRANZA     ACC: [id number] EXAM:  MR ANGIO NECK   ORDERED BY: NATALIE CARRANZA     ACC: [id number] EXAM:  MR BRAIN   ORDERED BY: NAVNEET CORONADO     PROCEDURE DATE:  10/30/2023          INTERPRETATION:  .    CLINICAL INFORMATION: Stroke.    TECHNIQUE: Multiplanar multi sequential MRI examination of the brain was   performed without the administration of IV gadolinium. MRA images through   the neck and [place] were obtained usinga combination of 2-D   and 3-D time-of-flight acquisition. The data was then reformatted into a   volumetric data set and reviewed as rotational MIP images.    COMPARISON: Most recent prior CT examination of the head from 10/27/2023.   No prior MRI studies are available for comparison.    FINDINGS:    MRI Brain: A wedge-shaped area of diffusion restriction is seen within   the right PICA territory. Associated T2/FLAIR hyperintense signal is also   seen, compatible with cytotoxic edema. Mild hemorrhagic transformation is   seen within the infarct bed manifested by high signal on T1-weighted   imaging as well as susceptibility artifact on the SWI sequence. Mild mass   effect is seen without effacement of the fourth ventricle or shift of the   posterior fossa midline structures.    This is superimposed upon encephalomalacia and gliosis involving the   right cerebellar hemisphere.    An additional vague area of diffusion reduction is seen within the left   parietal periventricular white matter without associated T2/FLAIR   hyperintense signal, compatible with cytotoxic edema. No gross   hemorrhagic transformation is noted in this location.    Scattered foci of susceptibility artifact are seen within the bilateral   basal ganglia, compatible with areas of chronic microhemorrhage.    Multiple additional patchy confluent nonspecific T2/FLAIR hyperintense   signal changes are noted throughout the bihemispheric white matter   without associated mass effect or restricted diffusion.    Ventricular size and configuration is unremarkable. No abnormal   extra-axial fluid collections are seen. Flow-voids are noted throughout   the major intracranial vessels, on the T2 weighted images, consistent   with their patency. The sellar location appears unremarkable. There is an   unchanged appearing small retrocerebellar arachnoid cyst versus cisterna   magna.    A polyp versus retention cyst is seen within the right maxillary sinus.   Additional minor scattered mucosal thickening seen throughout the ethmoid   labyrinth. The tympanomastoid cavities are clear. The left orbit appears   within normal limits. There is evidence of right-sided cataract removal.    There is an indeterminate mixed signal ovoid shaped soft tissue focus   involving the left superior pinna measuring 1.4 x 0.9 cm which appears   unchanged.    MRA Neck: Examination is motion limited.    There is a standard anatomic variation to the aortic arch. The origins of   the great vessels appear grossly unremarkable.    The bilateral common carotid arteries, carotid bifurcations and cervical   internal carotid arteries appear patent. The origin of the left vertebral   artery is normal. The left vertebral artery is patent.    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    MRA [place]: Atherosclerosis affects the bilateral carotid   siphons with mild area of focal stenosis involving the right   clinoid/supraclinoid junction. There is mild hypoplasia of the right A1   segment. Otherwise, the bilateral intracranial internal carotid,   anterior, and middle cerebral arteries appear unremarkable.    The anterior and bilateral posterior communicating arteries are seen.    The right V4 segment does not demonstrate flow relatedsignal. The left   V4 segment appears unremarkable. The vertebrobasilar confluence appears   unremarkable. Long segment mild stenosis involving the mid basilar   artery. The basilar tip appears unremarkable as well as the bilateral   posterior cerebral arteries.    IMPRESSION:    MRI BRAIN:  1. Evolving acute/subacute right-sided PICA distribution infarction with   associated cytotoxic edema and very mild hemorrhagic transformation.  2. Additional wedge-shaped area of acute/subacute ischemia within the   left parietal periatrial white matter with associated cytotoxic edema.  3. Indeterminate soft tissue lesion involving the left superior pinna   measuring 1.4 x 0.9 cm. Neoplasm cannot be excluded. Recommend   correlation with direct physical examination and visualization.    MRA NECK:  1. Extremely motion limited study.  2. 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 Recommend further evaluation with a CT angiogram study   of the neck.    MRA HEAD:  1. 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 This can be further evaluated with a CT angiogram study of the   head.  2. Mild focal stenosis of the right supraclinoid intracranial internal   carotid artery secondary to atherosclerosis.  3. Otherwise, no large vessel occlusion or major stenosis.    --- End of Report ---            LAKESHA DIAZ MD; Attending Radiologist  This document has been electronically signed. Oct 30 2023  9:20PM    < end of copied text >

## 2023-11-05 NOTE — PROGRESS NOTE ADULT - PROBLEM SELECTOR PLAN 1
Hypoxemia likely 2/2 cardiogenic pulm edema  - Given infiltrates on CXR, will obtain CT chest non con, r/o PNA  - RVP negative  - cont supportive care, incentive spirometry  - weqan off O2 as tolerated

## 2023-11-05 NOTE — PROGRESS NOTE ADULT - ASSESSMENT
58M with PMH of CVA with mild left sided deficits, HTN, DM2, vertigo, HLD, Mobitz II s/p Medtronic dual chamber ICD (12/21/22) initially presented to OSH for SOB c/f ADHF vs. PNA, later found to have NSTEMI transferred to [facility] for LHC evaluation. 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 Admitted to CICU for hypotension, inotropic support, hemodynamic monitoring. Previously downgraded on 10/22, RRT called while on the floor for AMS and hypotensive to 80/40, CTH 10/27 found possible R cerebellar infarct, s/p re-transfer to CICU for inotropic support. Pt now stabilized and transferred back to medicine

## 2023-11-05 NOTE — PROGRESS NOTE ADULT - PROBLEM SELECTOR PLAN 3
TTE 10/26: EF <20 %, LV apical thrombus is seen. Small pericardial effusion noted adjacent to the right ventricle with no evidence of hemodynamic compromise.  - cont hep to coumadin bridge, started 11/3, dose 5mg again tonight  - monitor INR daily

## 2023-11-06 LAB
ALBUMIN SERPL ELPH-MCNC: 3 G/DL — LOW (ref 3.3–5)
ALBUMIN SERPL ELPH-MCNC: 3 G/DL — LOW (ref 3.3–5)
ALP SERPL-CCNC: 89 U/L — SIGNIFICANT CHANGE UP (ref 40–120)
ALP SERPL-CCNC: 89 U/L — SIGNIFICANT CHANGE UP (ref 40–120)
ALT FLD-CCNC: 36 U/L — SIGNIFICANT CHANGE UP (ref 10–45)
ALT FLD-CCNC: 36 U/L — SIGNIFICANT CHANGE UP (ref 10–45)
ANION GAP SERPL CALC-SCNC: 14 MMOL/L — SIGNIFICANT CHANGE UP (ref 5–17)
ANION GAP SERPL CALC-SCNC: 14 MMOL/L — SIGNIFICANT CHANGE UP (ref 5–17)
ANION GAP SERPL CALC-SCNC: 15 MMOL/L — SIGNIFICANT CHANGE UP (ref 5–17)
ANION GAP SERPL CALC-SCNC: 15 MMOL/L — SIGNIFICANT CHANGE UP (ref 5–17)
APTT BLD: 41.1 SEC — HIGH (ref 24.5–35.6)
APTT BLD: 41.1 SEC — HIGH (ref 24.5–35.6)
APTT BLD: 42.2 SEC — HIGH (ref 24.5–35.6)
APTT BLD: 42.2 SEC — HIGH (ref 24.5–35.6)
APTT BLD: 54.1 SEC — HIGH (ref 24.5–35.6)
APTT BLD: 54.1 SEC — HIGH (ref 24.5–35.6)
APTT BLD: 62.9 SEC — HIGH (ref 24.5–35.6)
APTT BLD: 62.9 SEC — HIGH (ref 24.5–35.6)
AST SERPL-CCNC: 14 U/L — SIGNIFICANT CHANGE UP (ref 10–40)
AST SERPL-CCNC: 14 U/L — SIGNIFICANT CHANGE UP (ref 10–40)
BASOPHILS # BLD AUTO: 0.02 K/UL — SIGNIFICANT CHANGE UP (ref 0–0.2)
BASOPHILS # BLD AUTO: 0.02 K/UL — SIGNIFICANT CHANGE UP (ref 0–0.2)
BASOPHILS NFR BLD AUTO: 0.3 % — SIGNIFICANT CHANGE UP (ref 0–2)
BASOPHILS NFR BLD AUTO: 0.3 % — SIGNIFICANT CHANGE UP (ref 0–2)
BILIRUB SERPL-MCNC: 0.2 MG/DL — SIGNIFICANT CHANGE UP (ref 0.2–1.2)
BILIRUB SERPL-MCNC: 0.2 MG/DL — SIGNIFICANT CHANGE UP (ref 0.2–1.2)
BUN SERPL-MCNC: 51 MG/DL — HIGH (ref 7–23)
BUN SERPL-MCNC: 51 MG/DL — HIGH (ref 7–23)
BUN SERPL-MCNC: 53 MG/DL — HIGH (ref 7–23)
BUN SERPL-MCNC: 53 MG/DL — HIGH (ref 7–23)
CALCIUM SERPL-MCNC: 8.7 MG/DL — SIGNIFICANT CHANGE UP (ref 8.4–10.5)
CALCIUM SERPL-MCNC: 8.7 MG/DL — SIGNIFICANT CHANGE UP (ref 8.4–10.5)
CALCIUM SERPL-MCNC: 9 MG/DL — SIGNIFICANT CHANGE UP (ref 8.4–10.5)
CALCIUM SERPL-MCNC: 9 MG/DL — SIGNIFICANT CHANGE UP (ref 8.4–10.5)
CHLORIDE SERPL-SCNC: 101 MMOL/L — SIGNIFICANT CHANGE UP (ref 96–108)
CHLORIDE SERPL-SCNC: 101 MMOL/L — SIGNIFICANT CHANGE UP (ref 96–108)
CHLORIDE SERPL-SCNC: 99 MMOL/L — SIGNIFICANT CHANGE UP (ref 96–108)
CHLORIDE SERPL-SCNC: 99 MMOL/L — SIGNIFICANT CHANGE UP (ref 96–108)
CO2 SERPL-SCNC: 20 MMOL/L — LOW (ref 22–31)
CO2 SERPL-SCNC: 20 MMOL/L — LOW (ref 22–31)
CO2 SERPL-SCNC: 22 MMOL/L — SIGNIFICANT CHANGE UP (ref 22–31)
CO2 SERPL-SCNC: 22 MMOL/L — SIGNIFICANT CHANGE UP (ref 22–31)
CREAT SERPL-MCNC: 2.16 MG/DL — HIGH (ref 0.5–1.3)
CREAT SERPL-MCNC: 2.16 MG/DL — HIGH (ref 0.5–1.3)
CREAT SERPL-MCNC: 2.26 MG/DL — HIGH (ref 0.5–1.3)
CREAT SERPL-MCNC: 2.26 MG/DL — HIGH (ref 0.5–1.3)
EGFR: 33 ML/MIN/1.73M2 — LOW
EGFR: 33 ML/MIN/1.73M2 — LOW
EGFR: 35 ML/MIN/1.73M2 — LOW
EGFR: 35 ML/MIN/1.73M2 — LOW
EOSINOPHIL # BLD AUTO: 0.05 K/UL — SIGNIFICANT CHANGE UP (ref 0–0.5)
EOSINOPHIL # BLD AUTO: 0.05 K/UL — SIGNIFICANT CHANGE UP (ref 0–0.5)
EOSINOPHIL NFR BLD AUTO: 0.7 % — SIGNIFICANT CHANGE UP (ref 0–6)
EOSINOPHIL NFR BLD AUTO: 0.7 % — SIGNIFICANT CHANGE UP (ref 0–6)
GLUCOSE BLDC GLUCOMTR-MCNC: 104 MG/DL — HIGH (ref 70–99)
GLUCOSE BLDC GLUCOMTR-MCNC: 104 MG/DL — HIGH (ref 70–99)
GLUCOSE BLDC GLUCOMTR-MCNC: 106 MG/DL — HIGH (ref 70–99)
GLUCOSE BLDC GLUCOMTR-MCNC: 106 MG/DL — HIGH (ref 70–99)
GLUCOSE BLDC GLUCOMTR-MCNC: 118 MG/DL — HIGH (ref 70–99)
GLUCOSE BLDC GLUCOMTR-MCNC: 118 MG/DL — HIGH (ref 70–99)
GLUCOSE BLDC GLUCOMTR-MCNC: 174 MG/DL — HIGH (ref 70–99)
GLUCOSE BLDC GLUCOMTR-MCNC: 174 MG/DL — HIGH (ref 70–99)
GLUCOSE BLDC GLUCOMTR-MCNC: 210 MG/DL — HIGH (ref 70–99)
GLUCOSE BLDC GLUCOMTR-MCNC: 210 MG/DL — HIGH (ref 70–99)
GLUCOSE BLDC GLUCOMTR-MCNC: 235 MG/DL — HIGH (ref 70–99)
GLUCOSE BLDC GLUCOMTR-MCNC: 235 MG/DL — HIGH (ref 70–99)
GLUCOSE BLDC GLUCOMTR-MCNC: 294 MG/DL — HIGH (ref 70–99)
GLUCOSE BLDC GLUCOMTR-MCNC: 294 MG/DL — HIGH (ref 70–99)
GLUCOSE SERPL-MCNC: 258 MG/DL — HIGH (ref 70–99)
GLUCOSE SERPL-MCNC: 258 MG/DL — HIGH (ref 70–99)
GLUCOSE SERPL-MCNC: 84 MG/DL — SIGNIFICANT CHANGE UP (ref 70–99)
GLUCOSE SERPL-MCNC: 84 MG/DL — SIGNIFICANT CHANGE UP (ref 70–99)
HCT VFR BLD CALC: 31.8 % — LOW (ref 39–50)
HCT VFR BLD CALC: 31.8 % — LOW (ref 39–50)
HCT VFR BLD CALC: 33.2 % — LOW (ref 39–50)
HCT VFR BLD CALC: 33.2 % — LOW (ref 39–50)
HGB BLD-MCNC: 10.3 G/DL — LOW (ref 13–17)
HGB BLD-MCNC: 10.3 G/DL — LOW (ref 13–17)
HGB BLD-MCNC: 9.9 G/DL — LOW (ref 13–17)
HGB BLD-MCNC: 9.9 G/DL — LOW (ref 13–17)
IMM GRANULOCYTES NFR BLD AUTO: 0.4 % — SIGNIFICANT CHANGE UP (ref 0–0.9)
IMM GRANULOCYTES NFR BLD AUTO: 0.4 % — SIGNIFICANT CHANGE UP (ref 0–0.9)
INR BLD: 1.3 RATIO — HIGH (ref 0.85–1.18)
INR BLD: 1.3 RATIO — HIGH (ref 0.85–1.18)
INR BLD: 1.47 RATIO — HIGH (ref 0.85–1.18)
INR BLD: 1.47 RATIO — HIGH (ref 0.85–1.18)
LACTATE SERPL-SCNC: 1 MMOL/L — SIGNIFICANT CHANGE UP (ref 0.5–2)
LACTATE SERPL-SCNC: 1 MMOL/L — SIGNIFICANT CHANGE UP (ref 0.5–2)
LYMPHOCYTES # BLD AUTO: 0.74 K/UL — LOW (ref 1–3.3)
LYMPHOCYTES # BLD AUTO: 0.74 K/UL — LOW (ref 1–3.3)
LYMPHOCYTES # BLD AUTO: 9.9 % — LOW (ref 13–44)
LYMPHOCYTES # BLD AUTO: 9.9 % — LOW (ref 13–44)
MAGNESIUM SERPL-MCNC: 2.2 MG/DL — SIGNIFICANT CHANGE UP (ref 1.6–2.6)
MCHC RBC-ENTMCNC: 25.6 PG — LOW (ref 27–34)
MCHC RBC-ENTMCNC: 31 GM/DL — LOW (ref 32–36)
MCHC RBC-ENTMCNC: 31 GM/DL — LOW (ref 32–36)
MCHC RBC-ENTMCNC: 31.1 GM/DL — LOW (ref 32–36)
MCHC RBC-ENTMCNC: 31.1 GM/DL — LOW (ref 32–36)
MCV RBC AUTO: 82.2 FL — SIGNIFICANT CHANGE UP (ref 80–100)
MCV RBC AUTO: 82.2 FL — SIGNIFICANT CHANGE UP (ref 80–100)
MCV RBC AUTO: 82.6 FL — SIGNIFICANT CHANGE UP (ref 80–100)
MCV RBC AUTO: 82.6 FL — SIGNIFICANT CHANGE UP (ref 80–100)
MONOCYTES # BLD AUTO: 0.7 K/UL — SIGNIFICANT CHANGE UP (ref 0–0.9)
MONOCYTES # BLD AUTO: 0.7 K/UL — SIGNIFICANT CHANGE UP (ref 0–0.9)
MONOCYTES NFR BLD AUTO: 9.3 % — SIGNIFICANT CHANGE UP (ref 2–14)
MONOCYTES NFR BLD AUTO: 9.3 % — SIGNIFICANT CHANGE UP (ref 2–14)
NEUTROPHILS # BLD AUTO: 5.95 K/UL — SIGNIFICANT CHANGE UP (ref 1.8–7.4)
NEUTROPHILS # BLD AUTO: 5.95 K/UL — SIGNIFICANT CHANGE UP (ref 1.8–7.4)
NEUTROPHILS NFR BLD AUTO: 79.4 % — HIGH (ref 43–77)
NEUTROPHILS NFR BLD AUTO: 79.4 % — HIGH (ref 43–77)
NRBC # BLD: 0 /100 WBCS — SIGNIFICANT CHANGE UP (ref 0–0)
PHOSPHATE SERPL-MCNC: 5.5 MG/DL — HIGH (ref 2.5–4.5)
PHOSPHATE SERPL-MCNC: 5.5 MG/DL — HIGH (ref 2.5–4.5)
PHOSPHATE SERPL-MCNC: 5.7 MG/DL — HIGH (ref 2.5–4.5)
PHOSPHATE SERPL-MCNC: 5.7 MG/DL — HIGH (ref 2.5–4.5)
PLATELET # BLD AUTO: 207 K/UL — SIGNIFICANT CHANGE UP (ref 150–400)
PLATELET # BLD AUTO: 207 K/UL — SIGNIFICANT CHANGE UP (ref 150–400)
PLATELET # BLD AUTO: 219 K/UL — SIGNIFICANT CHANGE UP (ref 150–400)
PLATELET # BLD AUTO: 219 K/UL — SIGNIFICANT CHANGE UP (ref 150–400)
POTASSIUM SERPL-MCNC: 4.4 MMOL/L — SIGNIFICANT CHANGE UP (ref 3.5–5.3)
POTASSIUM SERPL-SCNC: 4.4 MMOL/L — SIGNIFICANT CHANGE UP (ref 3.5–5.3)
PROCALCITONIN SERPL-MCNC: 0.11 NG/ML — HIGH (ref 0.02–0.1)
PROCALCITONIN SERPL-MCNC: 0.11 NG/ML — HIGH (ref 0.02–0.1)
PROT SERPL-MCNC: 5.8 G/DL — LOW (ref 6–8.3)
PROT SERPL-MCNC: 5.8 G/DL — LOW (ref 6–8.3)
PROTHROM AB SERPL-ACNC: 14.2 SEC — HIGH (ref 9.5–13)
PROTHROM AB SERPL-ACNC: 14.2 SEC — HIGH (ref 9.5–13)
PROTHROM AB SERPL-ACNC: 15.3 SEC — HIGH (ref 9.5–13)
PROTHROM AB SERPL-ACNC: 15.3 SEC — HIGH (ref 9.5–13)
RAPID RVP RESULT: SIGNIFICANT CHANGE UP
RAPID RVP RESULT: SIGNIFICANT CHANGE UP
RBC # BLD: 3.87 M/UL — LOW (ref 4.2–5.8)
RBC # BLD: 3.87 M/UL — LOW (ref 4.2–5.8)
RBC # BLD: 4.02 M/UL — LOW (ref 4.2–5.8)
RBC # BLD: 4.02 M/UL — LOW (ref 4.2–5.8)
RBC # FLD: 15.9 % — HIGH (ref 10.3–14.5)
SARS-COV-2 RNA SPEC QL NAA+PROBE: SIGNIFICANT CHANGE UP
SARS-COV-2 RNA SPEC QL NAA+PROBE: SIGNIFICANT CHANGE UP
SODIUM SERPL-SCNC: 135 MMOL/L — SIGNIFICANT CHANGE UP (ref 135–145)
SODIUM SERPL-SCNC: 135 MMOL/L — SIGNIFICANT CHANGE UP (ref 135–145)
SODIUM SERPL-SCNC: 136 MMOL/L — SIGNIFICANT CHANGE UP (ref 135–145)
SODIUM SERPL-SCNC: 136 MMOL/L — SIGNIFICANT CHANGE UP (ref 135–145)
VIT B1 SERPL-MCNC: 52.7 NMOL/L — LOW (ref 66.5–200)
VIT B1 SERPL-MCNC: 52.7 NMOL/L — LOW (ref 66.5–200)
WBC # BLD: 6.75 K/UL — SIGNIFICANT CHANGE UP (ref 3.8–10.5)
WBC # BLD: 6.75 K/UL — SIGNIFICANT CHANGE UP (ref 3.8–10.5)
WBC # BLD: 7.49 K/UL — SIGNIFICANT CHANGE UP (ref 3.8–10.5)
WBC # BLD: 7.49 K/UL — SIGNIFICANT CHANGE UP (ref 3.8–10.5)
WBC # FLD AUTO: 6.75 K/UL — SIGNIFICANT CHANGE UP (ref 3.8–10.5)
WBC # FLD AUTO: 6.75 K/UL — SIGNIFICANT CHANGE UP (ref 3.8–10.5)
WBC # FLD AUTO: 7.49 K/UL — SIGNIFICANT CHANGE UP (ref 3.8–10.5)
WBC # FLD AUTO: 7.49 K/UL — SIGNIFICANT CHANGE UP (ref 3.8–10.5)

## 2023-11-06 PROCEDURE — 70496 CT ANGIOGRAPHY HEAD: CPT | Mod: 26

## 2023-11-06 PROCEDURE — 99233 SBSQ HOSP IP/OBS HIGH 50: CPT

## 2023-11-06 PROCEDURE — 70450 CT HEAD/BRAIN W/O DYE: CPT | Mod: 26,59

## 2023-11-06 PROCEDURE — 99223 1ST HOSP IP/OBS HIGH 75: CPT

## 2023-11-06 PROCEDURE — 70498 CT ANGIOGRAPHY NECK: CPT | Mod: 26

## 2023-11-06 PROCEDURE — 99232 SBSQ HOSP IP/OBS MODERATE 35: CPT

## 2023-11-06 RX ORDER — PIPERACILLIN AND TAZOBACTAM 4; .5 G/20ML; G/20ML
3.38 INJECTION, POWDER, LYOPHILIZED, FOR SOLUTION INTRAVENOUS ONCE
Refills: 0 | Status: COMPLETED | OUTPATIENT
Start: 2023-11-06 | End: 2023-11-06

## 2023-11-06 RX ORDER — PIPERACILLIN AND TAZOBACTAM 4; .5 G/20ML; G/20ML
3.38 INJECTION, POWDER, LYOPHILIZED, FOR SOLUTION INTRAVENOUS EVERY 8 HOURS
Refills: 0 | Status: DISCONTINUED | OUTPATIENT
Start: 2023-11-07 | End: 2023-11-08

## 2023-11-06 RX ORDER — HEPARIN SODIUM 5000 [USP'U]/ML
1100 INJECTION INTRAVENOUS; SUBCUTANEOUS
Qty: 25000 | Refills: 0 | Status: DISCONTINUED | OUTPATIENT
Start: 2023-11-06 | End: 2023-11-07

## 2023-11-06 RX ORDER — WARFARIN SODIUM 2.5 MG/1
7.5 TABLET ORAL ONCE
Refills: 0 | Status: COMPLETED | OUTPATIENT
Start: 2023-11-06 | End: 2023-11-06

## 2023-11-06 RX ORDER — CHLORHEXIDINE GLUCONATE 213 G/1000ML
1 SOLUTION TOPICAL
Refills: 0 | Status: DISCONTINUED | OUTPATIENT
Start: 2023-11-06 | End: 2023-11-09

## 2023-11-06 RX ADMIN — PIPERACILLIN AND TAZOBACTAM 200 GRAM(S): 4; .5 INJECTION, POWDER, LYOPHILIZED, FOR SOLUTION INTRAVENOUS at 18:13

## 2023-11-06 RX ADMIN — Medication 81 MILLIGRAM(S): at 11:44

## 2023-11-06 RX ADMIN — Medication 12 UNIT(S): at 17:31

## 2023-11-06 RX ADMIN — INSULIN GLARGINE 30 UNIT(S): 100 INJECTION, SOLUTION SUBCUTANEOUS at 21:34

## 2023-11-06 RX ADMIN — Medication 12.5 MILLIGRAM(S): at 23:06

## 2023-11-06 RX ADMIN — ISOSORBIDE DINITRATE 20 MILLIGRAM(S): 5 TABLET ORAL at 18:21

## 2023-11-06 RX ADMIN — ATORVASTATIN CALCIUM 80 MILLIGRAM(S): 80 TABLET, FILM COATED ORAL at 21:35

## 2023-11-06 RX ADMIN — HEPARIN SODIUM 10.5 UNIT(S)/HR: 5000 INJECTION INTRAVENOUS; SUBCUTANEOUS at 03:00

## 2023-11-06 RX ADMIN — Medication 50 MILLIGRAM(S): at 18:20

## 2023-11-06 RX ADMIN — HEPARIN SODIUM 10.5 UNIT(S)/HR: 5000 INJECTION INTRAVENOUS; SUBCUTANEOUS at 11:40

## 2023-11-06 RX ADMIN — Medication 12.5 MILLIGRAM(S): at 11:44

## 2023-11-06 RX ADMIN — PANTOPRAZOLE SODIUM 40 MILLIGRAM(S): 20 TABLET, DELAYED RELEASE ORAL at 06:27

## 2023-11-06 RX ADMIN — Medication 50 MILLIGRAM(S): at 06:27

## 2023-11-06 RX ADMIN — WARFARIN SODIUM 7.5 MILLIGRAM(S): 2.5 TABLET ORAL at 21:35

## 2023-11-06 RX ADMIN — SENNA PLUS 2 TABLET(S): 8.6 TABLET ORAL at 21:35

## 2023-11-06 RX ADMIN — TAMSULOSIN HYDROCHLORIDE 0.4 MILLIGRAM(S): 0.4 CAPSULE ORAL at 21:35

## 2023-11-06 RX ADMIN — Medication 12.5 MILLIGRAM(S): at 06:28

## 2023-11-06 RX ADMIN — HEPARIN SODIUM 11 UNIT(S)/HR: 5000 INJECTION INTRAVENOUS; SUBCUTANEOUS at 14:50

## 2023-11-06 RX ADMIN — HEPARIN SODIUM 12 UNIT(S)/HR: 5000 INJECTION INTRAVENOUS; SUBCUTANEOUS at 23:12

## 2023-11-06 RX ADMIN — PIPERACILLIN AND TAZOBACTAM 25 GRAM(S): 4; .5 INJECTION, POWDER, LYOPHILIZED, FOR SOLUTION INTRAVENOUS at 21:34

## 2023-11-06 RX ADMIN — Medication 12.5 MILLIGRAM(S): at 18:20

## 2023-11-06 RX ADMIN — HEPARIN SODIUM 10.5 UNIT(S)/HR: 5000 INJECTION INTRAVENOUS; SUBCUTANEOUS at 07:54

## 2023-11-06 RX ADMIN — ISOSORBIDE DINITRATE 20 MILLIGRAM(S): 5 TABLET ORAL at 06:27

## 2023-11-06 RX ADMIN — Medication 2: at 09:00

## 2023-11-06 RX ADMIN — Medication 125 MICROGRAM(S): at 06:27

## 2023-11-06 RX ADMIN — Medication 12.5 MILLIGRAM(S): at 00:23

## 2023-11-06 RX ADMIN — BUMETANIDE 2 MILLIGRAM(S): 0.25 INJECTION INTRAMUSCULAR; INTRAVENOUS at 06:27

## 2023-11-06 RX ADMIN — ISOSORBIDE DINITRATE 20 MILLIGRAM(S): 5 TABLET ORAL at 11:44

## 2023-11-06 RX ADMIN — Medication 12 UNIT(S): at 09:00

## 2023-11-06 NOTE — PROGRESS NOTE ADULT - PROBLEM SELECTOR PLAN 2
# Acute HFREF, complicated by cardiogenic shock, acute systolic HF, s/p CCU stay and inotropiic support, now weaned off milrinone 10/28 -11/1  TTE 10/16: EF 20%, severely reduced LVSF  RHC 10/19: RA 10, PCWP 20, CO 4.4, CI 2.3  cMR 10/18: limited viability in LAD territory  R groin swan placed 10/30: RA 14/13/12, RV 51/14, PA 47/34/39, PCW 34/36/32, CI 2.5/CO 4.95, removed 11/2    - trend lactate daily  - bumex 2 mg PO QD, s/p IV bumex last on 11/4  - GDMT: hold Entresto i/s/o hypotension, spironolactone due to LAURA, c/w metoprolol 12.5 q6 uptitrate as recommended by HF and farxiga prior to DC, lipitor 80mg  - Hydralazine decreased from 100TID to 50BID due to borderline BPs  - s/p dig load, dig level 1.4, acceptable per HF   >     > Pending further recs from HF team   - f/u device interrogation  - avoid BB/CCB given borderline cardiac function  - strict Is/Os, daily weights  - HF holding off on launching VAD/transplant eval given cognitive issues however will continue to reassess.

## 2023-11-06 NOTE — CONSULT NOTE ADULT - TIME-BASED
78 Crescentic Advancement Flap Text: The defect edges were debeveled with a #15 scalpel blade.  Given the location of the defect and the proximity to free margins a crescentic advancement flap was deemed most appropriate.  Using a sterile surgical marker, the appropriate advancement flap was drawn incorporating the defect and placing the expected incisions within the relaxed skin tension lines where possible.    The area thus outlined was incised deep to adipose tissue with a #15 scalpel blade.  The skin margins were undermined to an appropriate distance in all directions utilizing iris scissors.

## 2023-11-06 NOTE — CHART NOTE - NSCHARTNOTEFT_GEN_A_CORE
Code stroke called for reported dizziness, blurred vision.  NIHSS: 4   MRS: 0 (per documentation, looking at PT notes 10/2023)  LKW: 11/6/23 1 pm  Not a candidate for tenecteplase given abnormal coags on heparin drip, recent strokes, and mild symptoms. Not candidate for thrombectomy given no LVO.    Impression:  Dizziness, blurred vision, and L-facial droop which may be localizable to posterior area with decreased perfusion (on presentation BP noted to be 86/54) with symptoms on re-evaluation resolved. Of note patient also has history of vertigo, may also be of a peripheral etiology.    Recommendations:  [] maintain BP >100s (symptoms resolved when BP rechecked and improved from 80s systolic to 97 systolic)  [] ok from neurovascular standpoint to c/w heparin to warfarin bridge  [] closely monitor I/Os  [] if mentation does not improve can consider 2 hour EEG  [] no indication for repeat MRI brain at this time  [] rest of recommendations per neurology note today 11/6/23    Case discussed with stroke fellow Dr. Ike Jiménez under supervision of Dr. Bradshaw stroke attending, and Dr. Irving stroke attending. Finalized upon attestation. Code stroke called for reported dizziness, blurred vision.  NIHSS: 4   MRS: 0 (per documentation, looking at PT notes 10/2023)  LKW: 11/6/23 1 pm  Not a candidate for tenecteplase given abnormal coags on heparin drip, recent strokes, and mild symptoms. Not candidate for thrombectomy given no LVO.    Impression:  Dizziness, blurred vision, which may be localizable to posterior area with decreased perfusion (on presentation BP noted to be 86/54) with symptoms on re-evaluation resolved. Also noted to have L-facial droop upon smile which upon re-evaluation at bedside has resolved following CT scan. Of note patient also has history of vertigo, may also be of a peripheral etiology.    Recommendations:  [] maintain BP >100s (symptoms resolved when BP rechecked and improved from 80s systolic to 97 systolic)  [] ok from neurovascular standpoint to c/w heparin to warfarin bridge  [] closely monitor I/Os  [] if mentation does not improve can consider 2 hour EEG  [] no indication for repeat MRI brain at this time  [] rest of recommendations per neurology note today 11/6/23    Case discussed with stroke fellow Dr. Ike Jiménez under supervision of Dr. Bradshaw stroke attending, and Dr. Irving stroke attending. Finalized upon attestation. Code stroke called for reported dizziness, blurred vision.  NIHSS: 4 -> 2 upon re-evaluation, scoring for not answering questions correctly but appears to be baseline  MRS: 0 (per documentation, looking at PT notes 10/2023)  LKW: 11/6/23 1 pm  Not a candidate for tenecteplase given abnormal coags on heparin drip, recent strokes, and mild symptoms. Not candidate for thrombectomy given no LVO.    Impression:  Dizziness, blurred vision, which may be localizable to posterior area with decreased perfusion (on presentation BP noted to be 86/54) with symptoms on re-evaluation resolved. Also noted to have L-facial droop upon smile which upon re-evaluation at bedside has resolved following CT scan. Of note patient also has history of vertigo, may also be of a peripheral etiology.    Recommendations:  [] maintain BP >100s (symptoms resolved when BP rechecked and improved from 80s systolic to 97 systolic)  [] ok from neurovascular standpoint to c/w heparin to warfarin bridge  [] closely monitor I/Os  [] if mentation does not improve can consider 2 hour EEG  [] no indication for repeat MRI brain at this time  [] rest of recommendations per neurology note today 11/6/23    Case discussed with stroke fellow Dr. Ike Jiménez under supervision of Dr. Bradshaw stroke attending, and Dr. Irving stroke attending. Finalized upon attestation. Code stroke called for reported dizziness, blurred vision.  NIHSS: 4 -> 2 upon re-evaluation, scoring for not answering questions correctly but appears to be baseline  MRS: 0 (per documentation, looking at PT notes 10/2023)  LKW: 11/6/23 1 pm  Not a candidate for tenecteplase given abnormal coags on heparin drip, recent strokes, and mild symptoms. Not candidate for thrombectomy given no LVO.    known low EF<20% HF with recent NSTEMI, recent CT chest showing multi-focus PNA, recent PICA infarct, stroke code was called in the setting of low SBP 80s, when baseline SBP was around 100-120. .     Impression:  Dizziness, blurred vision, which may be localizable to posterior circulation with decreased perfusion (on presentation BP noted to be 86/54) with symptoms resolved on re-evaluation. Also noted to have L-facial droop upon smile which was also resolved on re-evaluation after CT scan. Of note patient has history of vertigo, concerning baseline peripheral vestibular dysfunction, in the setting of known posterior circulation insufficiency.     Recommendations:  [] maintain BP >100s (symptoms resolved when BP rechecked and improved from 80s systolic to 97 systolic)  [] ok from neurovascular standpoint to c/w heparin to warfarin bridge  [] closely monitor I/Os  [] if mentation does not improve can consider 2 hour EEG  [] no indication for repeat MRI brain at this time  [] rest of recommendations per neurology note today 11/6/23    Case discussed with stroke fellow Dr. Ike Jiménez under supervision of Dr. Bradshaw stroke attending, and Dr. Irving stroke attending. Finalized upon attestation.

## 2023-11-06 NOTE — PROGRESS NOTE ADULT - ATTENDING COMMENTS
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 Had CVA at that time.  Admitted now with NSTEMI. Found to have high grade LAD stenosis and severe RCA disease.  Viability study via CMRI showed >50% LGE in LAD territory. + LV thrombus.   During this stay had a new CVA in PICA territory with mild hemorrhagic transformation as well as embolic appearing parietal stroke.   Plans for revascularization were halted due to this. He has been diuresed and started on low dose GDMT.    On exam he appears dry to me with low JVP, no peripheral edema and systemic hypotension.     *Yesterday after my evaluation a rapid was called due to dizziness and hypotension. A CT was repeated showing occlusion of the non dominant vertebral artery and high grade stenosis of the dominant. He received contrast for this study. Symptoms were deemed to be due to hypotension.    Current GDMT includes only Lopressor 12.5 Q6h and Hydralazine 50 Q8h and he is on Bumex 2mg per day and Digoxin.    - Transition to Toprol XL can lower daily dose to 25 mg   - Hold diuretics for 2 days  - Continue heparin gtt and ASA (INR 1.6) - transition to warfarin no expected procedures from cardiac perspective  - Lower Hydralazine to 25 mg if needed to allow for   - Continue Digoxin (Level 1.3 on 11/3)  - Raad Cr was ~1.6-1.8, if comes back down will introduce RAASi  - Family is eager to go home, had long discussion about current scenario     Maikel Spring MD

## 2023-11-06 NOTE — PROGRESS NOTE ADULT - PROBLEM SELECTOR PLAN 3
TTE 10/26: EF <20 %, LV apical thrombus is seen. Small pericardial effusion noted adjacent to the right ventricle with no evidence of hemodynamic compromise.  - continue hep to coumadin bridge, started 11/3.   - discussed with clinical pharmacist dose warfarin 7.5mg today  - monitor INR daily

## 2023-11-06 NOTE — RAPID RESPONSE TEAM SUMMARY - NSSITUATIONBACKGROUNDRRT_GEN_ALL_CORE
58M with PMH of CVA with mild left sided deficits, HTN, DM2, vertigo, HLD, Mobitz II s/p Medtronic dual chamber ICD (12/21/22) initially presented to OSH for SOB c/f ADHF vs. PNA, later found to have NSTEMI transferred to [facility] for LHC evaluation. 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 Admitted to CICU for hypotension, inotropic support, hemodynamic monitoring. Previously downgraded on 10/22, RRT called while on the floor for AMS and hypotensive to 80/40, CTH 10/27 found age indeterminate R cerebellar infarct, likely chronic. F/u MRI brain 10/30 showd evolving acute/subactue R PICA infarct with mild edema and mild hemorrhagic transformation, also with acute subacute left parietal infarct also embolic. Pt transferred back to medicine. 11/6/23 RRT called for complaints of dizziness, blurry vision, new as of 1PM. 58M with PMH of CVA with mild left sided deficits, HTN, DM2, vertigo, HLD, Mobitz II s/p Medtronic dual chamber ICD (12/21/22) initially presented to OSH for SOB c/f ADHF vs. PNA, later found to have NSTEMI transferred to [facility] for LHC evaluation. 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 Admitted to CICU for hypotension, inotropic support, hemodynamic monitoring. Previously downgraded on 10/22, RRT called while on the floor for AMS and hypotensive to 80/40, CTH 10/27 found age indeterminate R cerebellar infarct, likely chronic. F/u MRI brain 10/30 showd evolving acute/subactue R PICA infarct with mild edema and mild hemorrhagic transformation, also with acute subacute left parietal infarct also embolic. Pt transferred back to medicine. 11/6/23 RRT called for complaints of dizziness, blurry vision, new as of 1PM. Recent labs and medications reviewed without concern for possible contribution to current neurologic symptoms.

## 2023-11-06 NOTE — PROGRESS NOTE ADULT - PROBLEM SELECTOR PLAN 1
ICM  - Last TTE: EF <20% rMWA  - Cath: : 95% discrete proximal LAD disease and sequential multifocal disease with good distal target, 80-90% proximal LCx disease just prior to marginals, severe multifocal RCA. No viability on MRI.     - GDMT:    > BB: 11/4 can increase lopressor to 12.5mg Q6, Can transition to 50mg XL daily.     > ACE/ARB/ARNI: Stopped Entresto 24-26 BID. Cont HYdral 50mg/Isordil 20mg    > MRA: Stopped spironolactone 25 QDay     > SGLT2 I: Eventual farxiga 10mg daily prior to discharge  - Inotropes: Off milrinone since 11/1  - Diuretics: Currently on Bumex 2mg PO daily.   Please Stop Bumex and hold dose for 2days. Will re-evaluate for re-initiation.   - Advanced therapies: severe LV dysfunction with tachycardia and poor non-invasive hemodynamics concerning, currently holding off on launching VAD/transplant eval given cognitive issues however will continue to reassess. Of note he has good support and no clear psychosocial red flags. ABO AB.   - F/U PT recs.

## 2023-11-06 NOTE — CONSULT NOTE ADULT - NSCONSULTADDITIONALINFOA_GEN_ALL_CORE
NOTE INCOMPLETE Case discussed with medicine attending initially and then medicine ACP after evaluation patient to discuss pulmonary recommendations.

## 2023-11-06 NOTE — PROGRESS NOTE ADULT - SUBJECTIVE AND OBJECTIVE BOX
Patient seen and examined at bedside.    Overnight Events:  NAEON.   Tele: no events  Reports dizziness with positio changes.       REVIEW OF SYSTEMS:  CONSTITUTIONAL: No weakness, fevers or chills  EYES/ENT: No visual changes;  No dysphagia  NECK: No pain or stiffness  RESPIRATORY: No cough, wheezing, hemoptysis; No shortness of breath  CARDIOVASCULAR: No chest pain or palpitations; No lower extremity edema  GASTROINTESTINAL: No abdominal or epigastric pain. No nausea, vomiting.  BACK: No back pain  GENITOURINARY: No dysuria, frequency or hematuria  NEUROLOGICAL: No numbness or weakness  SKIN: No itching, burning, rashes, or lesions   All other review of systems is negative unless indicated above.      Current Meds:  aspirin enteric coated 81 milliGRAM(s) Oral daily  atorvastatin 80 milliGRAM(s) Oral at bedtime  benzocaine/menthol Lozenge 1 Lozenge Oral every 2 hours PRN  buMETAnide 2 milliGRAM(s) Oral daily  chlorhexidine 2% Cloths 1 Application(s) Topical <User Schedule>  digoxin     Tablet 125 MICROGram(s) Oral daily  heparin  Infusion 1100 Unit(s)/Hr IV Continuous <Continuous>  hydrALAZINE 50 milliGRAM(s) Oral two times a day  insulin glargine Injectable (LANTUS) 30 Unit(s) SubCutaneous at bedtime  insulin lispro (ADMELOG) corrective regimen sliding scale   SubCutaneous three times a day before meals  insulin lispro (ADMELOG) corrective regimen sliding scale   SubCutaneous at bedtime  insulin lispro Injectable (ADMELOG) 12 Unit(s) SubCutaneous three times a day before meals  isosorbide   dinitrate Tablet (ISORDIL) 20 milliGRAM(s) Oral three times a day  metoprolol tartrate 12.5 milliGRAM(s) Oral every 6 hours  ondansetron Injectable 4 milliGRAM(s) IV Push once  pantoprazole    Tablet 40 milliGRAM(s) Oral before breakfast  polyethylene glycol 3350 17 Gram(s) Oral daily PRN  senna 2 Tablet(s) Oral at bedtime  tamsulosin 0.4 milliGRAM(s) Oral at bedtime  warfarin 7.5 milliGRAM(s) Oral once      PAST MEDICAL & SURGICAL HISTORY:  CVA (cerebrovascular accident)      T2DM (type 2 diabetes mellitus)      HTN (hypertension)      HLD (hyperlipidemia)      S/P cystoscopy      S/P hernia repair          Vitals:  T(F): 98.1 (11-06), Max: 98.7 (11-06)  HR: 108 (11-06) (106 - 114)  BP: 97/62 (11-06) (94/54 - 106/70)  RR: 18 (11-06)  SpO2: 98% (11-06)  I&O's Summary    05 Nov 2023 07:01  -  06 Nov 2023 07:00  --------------------------------------------------------  IN: 996 mL / OUT: 0 mL / NET: 996 mL    06 Nov 2023 07:01  -  06 Nov 2023 15:18  --------------------------------------------------------  IN: 240 mL / OUT: 100 mL / NET: 140 mL        Physical Exam:  Appearance: No acute distress; well appearing  Eyes: PERRL, EOMI, pink conjunctiva  HENT: Normal oral mucosa  Cardiovascular: RRR, S1, S2, no murmurs, no edema; no JVD  Respiratory: Clear to auscultation bilaterally  Gastrointestinal: soft, non-tender, non-distended with normal bowel sounds  Musculoskeletal: No clubbing; no joint deformity   Neurologic: Non-focal  Lymphatic: No lymphadenopathy  Psychiatry: AAOx3, mood & affect appropriate  Skin: No rashes, ecchymoses, or cyanosis                          9.9    7.49  )-----------( 219      ( 06 Nov 2023 13:32 )             31.8     11-06    136  |  101  |  51<H>  ----------------------------<  84  4.4   |  20<L>  |  2.16<H>    Ca    8.7      06 Nov 2023 13:32  Phos  5.7     11-06  Mg     2.2     11-06    TPro  5.8<L>  /  Alb  3.0<L>  /  TBili  0.2  /  DBili  x   /  AST  14  /  ALT  36  /  AlkPhos  89  11-06    PT/INR - ( 06 Nov 2023 13:32 )   PT: 15.3 sec;   INR: 1.47 ratio         PTT - ( 06 Nov 2023 13:32 )  PTT:41.1 sec

## 2023-11-06 NOTE — CONSULT NOTE ADULT - ASSESSMENT
#Acute Hypoxemic Respiratory Failure  - Continue supplemental O2 to maintain O2 sat > 90%  - Incentive spirometer and acapella if patient able to cooperate  - Hypoxemia most likely related to his cardiovascular disease      #Pleural Effusion  - Continued attempts at volume removal  - Would increase diuresis  - No role for thoracentesis at this time as patient is breathing comfortably and has bilateral effusions in the setting of known cardiovascular disease  - He would have to be off AC if thoracentesis is needed in the future - currently on AC for LV thrombus      #6 mm pulmonary nodule  - Attention to follow-up on repeat imaging in 3-6 months      #Consolidations  - Patient reportedly treated for pneumonia at [facility] - though unclear duration of antibiotics  - Given hypotension would repeat cultures with RVP, urine legionella, urine strep Ag, nasal MRSA, and blood cultures  - Though I doubt infection it is reasonable to give antibiotics for 48 hours pending culture results - if all negative would then D/C antibiotics  - Would check lactate and procalcitonin as well    #Cardiovascular disease  - Continue management as per cardiology  - From a pulmonary standpoint he needs more diuresis - his most recent RHC from 10/30 notes continued elevation in PCWP  - Continue AC for LV thrombus as per Cardiology 58M DM2, HTN, HLD, prior CVA (2012) with mild L sided weakness presenting as a transfer from [facility] for NSTEMI on 10/13/23. His hospital course has been complicated by acute systolic dysfunction, LV thrombus, and possible acute stroke.  - He has had a LHC with triple vessel disease and a RHC with elevated filling pressures    #Acute Hypoxemic Respiratory Failure  - Continue supplemental O2 to maintain O2 sat > 90%  - Incentive spirometer and acapella if patient able to cooperate  - Hypoxemia most likely related to his cardiovascular disease    #Pleural Effusion  - Continued attempts at volume removal with close monitoring of I/O and daily weights. No output documented today  - Would increase diuresis as able - but may need to consider Nephrology evaluation to see if renal replacement therapy becomes necessary if renal function worsens and/or unable to diurese  - No role for thoracentesis at this time as patient is breathing comfortably and has bilateral effusions in the setting of known cardiovascular disease  - He would have to be off AC if thoracentesis is needed in the future - currently on AC for LV thrombus    #6 mm pulmonary nodule  - Attention to follow-up on repeat imaging in 3-6 months  - Patient does not have any history of smoking, fevers/night sweats, or reported B-symptoms    #Consolidations  - Patient reportedly treated for pneumonia at [facility] - though unclear duration of antibiotics  - Given hypotension would repeat cultures with RVP, urine legionella, urine strep Ag, nasal MRSA, and blood cultures. Would also check lactate and procalcitonin.  - Though I doubt infection it is reasonable to give antibiotics for 48 hours pending culture results - if all negative would then D/C antibiotics    #Lethargy  - Would consider ABG to evaluate for hypercapnia, oxygenation, and lactate  - Follow-up Neuro imaging done during code stroke and Neurology follow-up    #Cardiovascular disease  - Continue management as per cardiology - if unable to diurese may need to consider resumption of inotropic support ?  - From a pulmonary standpoint he needs more diuresis - his most recent RHC from 10/30 notes continued elevation in PCWP  - Continue AC for LV thrombus as per Cardiology  - Patient not a candidate for CABG - has triple vessel disease - remains on ASA and statin    #Prophylaxis  - DVT proph  - GI proph  - Aspiration precautions - this will be particularly important given his lethargy/forgetfulness  - Incentive spirometer and acapella if able     Patient's prognosis is guarded.

## 2023-11-06 NOTE — PROGRESS NOTE ADULT - SUBJECTIVE AND OBJECTIVE BOX
Neurology        S: patient seen and examined. MRI confirms embolic infarcts ;  hypoclycemia overnight, better             Medications: MEDICATIONS  (STANDING):  aspirin enteric coated 81 milliGRAM(s) Oral daily  atorvastatin 80 milliGRAM(s) Oral at bedtime  buMETAnide 2 milliGRAM(s) Oral daily  digoxin     Tablet 125 MICROGram(s) Oral daily  heparin  Infusion 1250 Unit(s)/Hr (10.5 mL/Hr) IV Continuous <Continuous>  hydrALAZINE 50 milliGRAM(s) Oral two times a day  insulin glargine Injectable (LANTUS) 30 Unit(s) SubCutaneous at bedtime  insulin lispro (ADMELOG) corrective regimen sliding scale   SubCutaneous three times a day before meals  insulin lispro (ADMELOG) corrective regimen sliding scale   SubCutaneous at bedtime  insulin lispro Injectable (ADMELOG) 12 Unit(s) SubCutaneous three times a day before meals  isosorbide   dinitrate Tablet (ISORDIL) 20 milliGRAM(s) Oral three times a day  metoprolol tartrate 12.5 milliGRAM(s) Oral every 6 hours  ondansetron Injectable 4 milliGRAM(s) IV Push once  pantoprazole    Tablet 40 milliGRAM(s) Oral before breakfast  senna 2 Tablet(s) Oral at bedtime  tamsulosin 0.4 milliGRAM(s) Oral at bedtime  warfarin 7.5 milliGRAM(s) Oral once    MEDICATIONS  (PRN):  benzocaine/menthol Lozenge 1 Lozenge Oral every 2 hours PRN Sore Throat  polyethylene glycol 3350 17 Gram(s) Oral daily PRN Constipation       Vitals:  Vital Signs Last 24 Hrs  T(C): 36.4 (06 Nov 2023 05:14), Max: 36.8 (05 Nov 2023 17:03)  T(F): 97.6 (06 Nov 2023 05:14), Max: 98.3 (05 Nov 2023 17:03)  HR: 109 (06 Nov 2023 09:04) (107 - 114)  BP: 97/63 (06 Nov 2023 09:04) (94/54 - 106/70)  BP(mean): --  RR: 18 (06 Nov 2023 05:14) (18 - 21)  SpO2: 98% (06 Nov 2023 05:14) (95% - 98%)    Parameters below as of 06 Nov 2023 05:14  Patient On (Oxygen Delivery Method): nasal cannula                General Exam:   General Appearance: Appropriately dressed and in no acute distress       Head: Normocephalic, atraumatic and no dysmorphic features  Ear, Nose, and Throat: Moist mucous membranes  CVS: S1S2+  Resp: No SOB, no wheeze or rhonchi  GI: soft NT/ND  Extremities: No edema or cyanosis  Skin: No bruises or rashes     Neurological Exam:  Mental Status: Awake, alert and oriented x 2.  Able to follow simple verbal commands. Able to name and repeat. fluent speech. No obvious aphasia or dysarthria noted. poor insight. not understanding why he is in hospital   Cranial Nerves: PERRL, EOMI, VFFC, sensation V1-V3 intact,  no obvious facial asymmetry, equal elevation of palate, scm/trap 5/5, tongue is midline on protrusion. no obvious papilledema on fundoscopic exam. hearing is grossly intact.   Motor: Normal bulk, tone and strength throughout. Fine finger movements were intact and symmetric. no tremors or drift noted.    Sensation: Intact to light touch and pinprick throughout. no right/left confusion. no extinction to tactile on DSS. Romberg was negative.   Reflexes: 1+ throughout at biceps, brachioradialis, triceps, patellars and ankles bilaterally and equal. No clonus. R toe and L toe were both downgoing.  Coordination: No dysmetria on FNF or HKS  Gait:   no limitations in gait.     Data/Labs/Imaging which I personally reviewed.         LABS:                          10.3   6.75  )-----------( 207      ( 06 Nov 2023 06:49 )             33.2     11-06    135  |  99  |  53<H>  ----------------------------<  258<H>  4.4   |  22  |  2.26<H>    Ca    9.0      06 Nov 2023 06:49  Phos  5.5     11-06  Mg     2.2     11-06        PT/INR - ( 06 Nov 2023 03:12 )   PT: 14.2 sec;   INR: 1.30 ratio         PTT - ( 06 Nov 2023 03:12 )  PTT:62.9 sec  Urinalysis Basic - ( 06 Nov 2023 06:49 )    Color: x / Appearance: x / SG: x / pH: x  Gluc: 258 mg/dL / Ketone: x  / Bili: x / Urobili: x   Blood: x / Protein: x / Nitrite: x   Leuk Esterase: x / RBC: x / WBC x   Sq Epi: x / Non Sq Epi: x / Bacteria: x           < from: CT Head No Cont (10.27.23 @ 18:04) >    ACC: [id number] EXAM:  CT BRAIN   ORDERED BY: BRETT HOPSON     PROCEDURE DATE:  10/27/2023          INTERPRETATION:  Clinical Indication: CVA    5mm axial sections of the brain were obtained from base to vertex,   without the intravenous administration of contrast material. Coronal and   sagittal computer generated reconstructed views are available.    No prior brain imaging is available for comparison.          The ventricles and sulci are prominent consistent with mild atrophy.   Small vesselwhite matter ischemic changes are noted. There is a infarct   in the right medial cerebellum of undetermined age which could be acute   to subacute based on its degree of lucency. There is no significant   hemorrhage. Bone window examination is unremarkable. There is been   previous right-sided lens replacement surgery.      IMPRESSION: Atrophy and small vessel white matter ischemic changes. Right   medial cerebellar infarct may be acute versus subacute. No hemorrhage.      --- End of Report ---    < from: MR Angio Head No Cont (10.30.23 @ 20:58) >    ACC: [id number] EXAM:  MR ANGIO BRAIN   ORDERED BY: NATALIE CARRANZA     ACC: [id number] EXAM:  MR ANGIO NECK   ORDERED BY: NATALIE CARRANZA     ACC: [id number] EXAM:  MR BRAIN   ORDERED BY: NAVNEET CORONADO     PROCEDURE DATE:  10/30/2023          INTERPRETATION:  .    CLINICAL INFORMATION: Stroke.    TECHNIQUE: Multiplanar multi sequential MRI examination of the brain was   performed without the administration of IV gadolinium. MRA images through   the neck and [place] of [place] were obtained usinga combination of 2-D   and 3-D time-of-flight acquisition. The data was then reformatted into a   volumetric data set and reviewed as rotational MIP images.    COMPARISON: Most recent prior CT examination of the head from 10/27/2023.   No prior MRI studies are available for comparison.    FINDINGS:    MRI Brain: A wedge-shaped area of diffusion restriction is seen within   the right PICA territory. Associated T2/FLAIR hyperintense signal is also   seen, compatible with cytotoxic edema. Mild hemorrhagic transformation is   seen within the infarct bed manifested by high signal on T1-weighted   imaging as well as susceptibility artifact on the SWI sequence. Mild mass   effect is seen without effacement of the fourth ventricle or shift of the   posterior fossa midline structures.    This is superimposed upon encephalomalacia and gliosis involving the   right cerebellar hemisphere.    An additional vague area of diffusion reduction is seen within the left   parietal periventricular white matter without associated T2/FLAIR   hyperintense signal, compatible with cytotoxic edema. No gross   hemorrhagic transformation is noted in this location.    Scattered foci of susceptibility artifact are seen within the bilateral   basal ganglia, compatible with areas of chronic microhemorrhage.    Multiple additional patchy confluent nonspecific T2/FLAIR hyperintense   signal changes are noted throughout the bihemispheric white matter   without associated mass effect or restricted diffusion.    Ventricular size and configuration is unremarkable. No abnormal   extra-axial fluid collections are seen. Flow-voids are noted throughout   the major intracranial vessels, on the T2 weighted images, consistent   with their patency. The sellar location appears unremarkable. There is an   unchanged appearing small retrocerebellar arachnoid cyst versus cisterna   magna.    A polyp versus retention cyst is seen within the right maxillary sinus.   Additional minor scattered mucosal thickening seen throughout the ethmoid   labyrinth. The tympanomastoid cavities are clear. The left orbit appears   within normal limits. There is evidence of right-sided cataract removal.    There is an indeterminate mixed signal ovoid shaped soft tissue focus   involving the left superior pinna measuring 1.4 x 0.9 cm which appears   unchanged.    MRA Neck: Examination is motion limited.    There is a standard anatomic variation to the aortic arch. The origins of   the great vessels appear grossly unremarkable.    The bilateral common carotid arteries, carotid bifurcations and cervical   internal carotid arteries appear patent. The origin of the left vertebral   artery is normal. The left vertebral artery is patent.    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    MRA [place] of [place]: Atherosclerosis affects the bilateral carotid   siphons with mild area of focal stenosis involving the right   clinoid/supraclinoid junction. There is mild hypoplasia of the right A1   segment. Otherwise, the bilateral intracranial internal carotid,   anterior, and middle cerebral arteries appear unremarkable.    The anterior and bilateral posterior communicating arteries are seen.    The right V4 segment does not demonstrate flow relatedsignal. The left   V4 segment appears unremarkable. The vertebrobasilar confluence appears   unremarkable. Long segment mild stenosis involving the mid basilar   artery. The basilar tip appears unremarkable as well as the bilateral   posterior cerebral arteries.    IMPRESSION:    MRI BRAIN:  1. Evolving acute/subacute right-sided PICA distribution infarction with   associated cytotoxic edema and very mild hemorrhagic transformation.  2. Additional wedge-shaped area of acute/subacute ischemia within the   left parietal periatrial white matter with associated cytotoxic edema.  3. Indeterminate soft tissue lesion involving the left superior pinna   measuring 1.4 x 0.9 cm. Neoplasm cannot be excluded. Recommend   correlation with direct physical examination and visualization.    MRA NECK:  1. Extremely motion limited study.  2. 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 Recommend further evaluation with a CT angiogram study   of the neck.    MRA HEAD:  1. 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 This can be further evaluated with a CT angiogram study of the   head.  2. Mild focal stenosis of the right supraclinoid intracranial internal   carotid artery secondary to atherosclerosis.  3. Otherwise, no large vessel occlusion or major stenosis.    --- End of Report ---            LAKESHA DIAZ MD; Attending Radiologist  This document has been electronically signed. Oct 30 2023  9:20PM    < end of copied text >

## 2023-11-06 NOTE — PROGRESS NOTE ADULT - PROBLEM SELECTOR PLAN 1
# Hypoxemia likely 2/2 cardiogenic pulm edema, PNA  - Infiltrates on CXR.   - CT chest: Interval increase in bilateral peribronchovascular groundglass opacities and small consolidations suggesting of multifocal pneumonia. Increased bilateral pleural effusions.  - Afebrile, no leukocytosis. Check procal, sputum cx. Consult pulmonology team.  - RVP negative  - continue supportive care, incentive spirometry  - wean off O2 as tolerated

## 2023-11-06 NOTE — PROGRESS NOTE ADULT - ASSESSMENT
57 yo Male with prior Stroke with mild ?left sided deficits (improved), HTN, DM2, vertigo, HLD, Mobitz II s/pp [employer] dual chamber ICD (12/21/22) initially presented to [facility] from home with complaints of dyspnea and chest pain and was admitted w/ acute hypoxic respiratory failure likely due to acute pulmonary edema due to acute HFrEF in setting of acute NSTEMI, LAURA and enterovirus infection. Pt with brief MICU stay at [facility] requiring bipap (no intubation), was treated for PNA with cefepime, and was found to have TTE done 9/26/23 showing EF 20% with severely decreased LVSF, multiple regional wall motion abnormalities, and elevated LV end diastolic pressure. Pt was transferred to [facility] for cardiac eval.   TTE EF < 20%  [facility] 10/19: RA 10, RV 47/9/13, Wedge 29, PA 41/26/33, CO/CI 4.4/2.3, PA sat 57.3  EKG: sinus tachycardia, septal q-waves, left axis deviation   TTE 10/16/23: LV 5.5 cm, LVEF 20% with regional WMAs worse in the apex, LVOT VTI 8 cm, normal RV size/function, severe functional MR, small pericardial effusion, estimated RA pressure 8 mmHg   TTE 12/2022: normal LVEF   LHC: 95% discrete proximal LAD disease and sequential multifocal disease with good distal target, 80-90% proximal LCx disease just prior to marginals, severe multifocal RCA disease with good targets   A1c 8.4    CD 10/17 neg   NIHSS 1  CTH with age indeterminate R cerebellar infarct.  likely chronic   MRi brain with eovlving acute/subacute R PICA infarct with mild edema and mild hemorrhagic transformation. 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  occluded R VA.   CTH stable     Impression:   R cerebellar/PICA infarct as well as L parietal embolic infarct, likely cardioembolic       - c/o SOB ; f/u cardio and pulmo   - f/u heart failure team   - CTS eval for CABG given 3 vessel disease vs high risk PCI --> not a CABG candidate   - c/w asa and statin therpay for secondary stroke prevention.   - no objection to heparin drip for low EF and LV thrombus  --> no objection to transition to coumadin ; hep to coumadin bridge ; coumadin per INR   - called for risk stratification for heart transplant eval,  low-mod risk from stroke standpoint and no objection   - b12, TSH, RPR for reversible causes of dementia   - telemetry  - PT/OT   - check FS, glucose control <180  - GI/DVT ppx   - Thank you for allowing me to participate in the care of this patient. Call with questions.   spoke with primary team     Abelardo Irving MD  Vascular Neurology  Office: [phone]

## 2023-11-06 NOTE — CONSULT NOTE ADULT - NS ATTEST RISK PROBLEM GEN_ALL_CORE FT
#Acute Hypoxemic REspiratory Failure  #Decompensated Systolic Heart Failure  #Pulmonary Edema and Pleural Effusions  #Hypotension  #Lethargy

## 2023-11-06 NOTE — CONSULT NOTE ADULT - TIME BILLING
review of records/results/imaging, pulmonary evaluation/assessment/documentation, and discussion with patient/wife/medical team

## 2023-11-06 NOTE — RAPID RESPONSE TEAM SUMMARY - NSADDTLFINDINGSRRT_GEN_ALL_CORE
Vitally patient with soft BP hypotensive to ~90/60 SBP, at approximate baseline. On neurologic exam, patient AOx3, lethargic though at supposed baseline per family at bedside. Asymmetric CN VII exam (L sided weakness of smile) new since this morning. L beating nystagmus with CN III, IV, VI intact. Other cranial nerves grossly intact and no focal deficits of extremities. Code Stroke called with prompt response. Neurology recommended patient have additional brain imaging with CT head to evaluate for stroke. Not a TPA candidate given recent heparin use and previous hemorrhage stroke. Vitally patient with soft BP hypotensive to ~90/60 SBP, at approximate baseline. Rectal temperature taken, 100.0F. Blood glucose 104. On neurologic exam, patient AOx3, lethargic though at supposed baseline per family at bedside. Asymmetric CN VII exam (L sided weakness of smile) new since this morning. L beating nystagmus with CN III, IV, VI intact. Other cranial nerves grossly intact and no focal deficits of extremities. Code Stroke called with prompt response. Neurology recommended patient have additional brain imaging with CT head to evaluate for stroke. Not a TPA candidate given recent heparin use and previous hemorrhage stroke.

## 2023-11-06 NOTE — PROGRESS NOTE ADULT - ASSESSMENT
57 YO M with a history of CVA with residual mild R paresthesias, second degree Mobitz II heart block s/p DC-ICD 12/2022 (initial concern for sarcoid but negative biopsy/PET post procedure), DM2 (A1c 8.4%), and HTN who was admitted to [facility] with chest pain and dyspnea, found to have NSTEMI with markedly elevated troponins and new severe LV dysfunction with regional wall motion abnormalities as well as + enterovirus. He required NIPPV and was diuresed before being transferred to [facility] where LHC performed which revealed severe 3v CAD with critical proximal LAD involvement. CTS was consulted for CABG evaluation and HF consulted for comanagement.    He ultimately underwent RHC with revealed elevated wedge but normal cardiac output. He was transferred to CICU for swan placement and closer observation of hemodynamics. Found to have LV thrombus. Given concern for low flow status thus sent back to CCU 10/28. He is confused and was found to have R medial cerebellar infarct on head CT, with MRI showing evolving PICA stroke. Given evolving stroke, brain lesion and ongoing confusion he's not currently a candidate for coronary intervention. Additionally, per CTS review, no LAD viability. He's tolerated milrinone being weaned off as of 11/1 with stable end organ function and is tolerating escalation of vasodilators with room for further escalation. His CXR shows ongoing pulmonary vascular congestion. He's volume overloaded on exam with dilated, non collapsable IVC on bedside POCUS. Tachycardia has been improving off milrinone.    REVIEW OF STUDIES  TTE 10/26: EF <20% LVT present, small pericardial effusion w/o tamponade  RHC 10/19: RA 10, RV 47/9/13, Wedge 29, PA 41/26/33, CO/CI 4.4/2.3, PA sat 57.3  EKG: sinus tachycardia, septal q-waves, left axis deviation   TTE 10/16/23: LV 5.5 cm, LVEF 20% with regional WMAs worse in the apex, LVOT VTI 8 cm, normal RV size/function, severe functional MR, small pericardial effusion, estimated RA pressure 8 mmHg   TTE 12/2022: normal LVEF   [facility]: 95% discrete proximal LAD disease and sequential multifocal disease with good distal target, 80-90% proximal LCx disease just prior to marginals, severe multifocal RCA disease with good targets     Hemodynamics:  11/1/23 CVP 13, PA 48/23/35, mVO2 62.6%, Cris CI 2.4  10/21/23: RA 13 with V-wave 21, PA 50/33, PCW 33, MvO2 68.2, Cris CO/CI 4.4/2.56

## 2023-11-06 NOTE — PROGRESS NOTE ADULT - PROBLEM SELECTOR PLAN 6
Secondary to cardiorenal syndrome with component of ATN from cardiogenic shock, improving  - continue to monitor sCr daily  - avoid nephrotoxins

## 2023-11-06 NOTE — PROGRESS NOTE ADULT - SUBJECTIVE AND OBJECTIVE BOX
[facility] Division of Hospital Medicine  Marla Martin MD M-F, 8A-5P: MS Teams, Pager  Other Times: HIC Extension / HIC Pager      Patient is a 58y old  Male who presents with a chief complaint of NSTEMI (2023 08:34)      SUBJECTIVE / OVERNIGHT EVENTS:  Patient was examined this morning. He is sitting in bed. He denies fever, dizziness, chest pain, dyspnea, orthopnea, extremity swelling. 10 point ROS negative.     ADDITIONAL REVIEW OF SYSTEMS:    MEDICATIONS  (STANDING):  aspirin enteric coated 81 milliGRAM(s) Oral daily  atorvastatin 80 milliGRAM(s) Oral at bedtime  buMETAnide 2 milliGRAM(s) Oral daily  chlorhexidine 2% Cloths 1 Application(s) Topical <User Schedule>  digoxin     Tablet 125 MICROGram(s) Oral daily  heparin  Infusion 1250 Unit(s)/Hr (10.5 mL/Hr) IV Continuous <Continuous>  hydrALAZINE 50 milliGRAM(s) Oral two times a day  insulin glargine Injectable (LANTUS) 30 Unit(s) SubCutaneous at bedtime  insulin lispro (ADMELOG) corrective regimen sliding scale   SubCutaneous three times a day before meals  insulin lispro (ADMELOG) corrective regimen sliding scale   SubCutaneous at bedtime  insulin lispro Injectable (ADMELOG) 12 Unit(s) SubCutaneous three times a day before meals  isosorbide   dinitrate Tablet (ISORDIL) 20 milliGRAM(s) Oral three times a day  metoprolol tartrate 12.5 milliGRAM(s) Oral every 6 hours  ondansetron Injectable 4 milliGRAM(s) IV Push once  pantoprazole    Tablet 40 milliGRAM(s) Oral before breakfast  senna 2 Tablet(s) Oral at bedtime  tamsulosin 0.4 milliGRAM(s) Oral at bedtime  warfarin 7.5 milliGRAM(s) Oral once    MEDICATIONS  (PRN):  benzocaine/menthol Lozenge 1 Lozenge Oral every 2 hours PRN Sore Throat  polyethylene glycol 3350 17 Gram(s) Oral daily PRN Constipation      CAPILLARY BLOOD GLUCOSE      POCT Blood Glucose.: 174 mg/dL (2023 10:46)  POCT Blood Glucose.: 210 mg/dL (2023 08:58)  POCT Blood Glucose.: 235 mg/dL (2023 08:05)  POCT Blood Glucose.: 294 mg/dL (2023 02:36)  POCT Blood Glucose.: 158 mg/dL (2023 22:13)  POCT Blood Glucose.: 111 mg/dL (2023 19:29)  POCT Blood Glucose.: 87 mg/dL (2023 19:08)  POCT Blood Glucose.: 76 mg/dL (2023 18:48)  POCT Blood Glucose.: 43 mg/dL (2023 18:28)  POCT Blood Glucose.: 43 mg/dL (2023 18:27)  POCT Blood Glucose.: 72 mg/dL (2023 16:37)      I&O's Summary    2023 07:01  -  2023 07:00  --------------------------------------------------------  IN: 996 mL / OUT: 0 mL / NET: 996 mL    2023 07:01  -  2023 12:24  --------------------------------------------------------  IN: 120 mL / OUT: 0 mL / NET: 120 mL        Daily     Daily Weight in k (2023 08:00)    PHYSICAL EXAM:  Vital Signs Last 24 Hrs  T(C): 37.1 (2023 11:49), Max: 37.1 (2023 11:49)  T(F): 98.7 (2023 11:49), Max: 98.7 (2023 11:49)  HR: 110 (2023 11:49) (108 - 114)  BP: 98/62 (2023 11:49) (94/54 - 106/70)  BP(mean): --  RR: 18 (2023 11:49) (18 - 21)  SpO2: 99% (2023 11:49) (95% - 99%)    Parameters below as of 2023 11:49  Patient On (Oxygen Delivery Method): nasal cannula  O2 Flow (L/min): 3    CONSTITUTIONAL: NAD  EYES: PERRLA; conjunctiva and sclera clear  ENMT: Moist oral mucosa  NECK: Supple, no palpable masses  RESPIRATORY: Faint bibasilar crackles  CARDIOVASCULAR: Regular rate and rhythm, normal S1 and S2, no murmur/rub/gallop; No lower extremity edema  ABDOMEN: Nontender to palpation, normoactive bowel sounds, no rebound/guarding  MUSCULOSKELETAL:  No clubbing or cyanosis of digits; no joint swelling or tenderness to palpation  PSYCH: A+O to person, place. Not oriented to situation. Follows commands  NEUROLOGY: CN 2-12 are intact and symmetric; no gross sensory deficits   SKIN: No rashes; no palpable lesions      LABS:                        10.3   6.75  )-----------( 207      ( 2023 06:49 )             33.2     11-06    135  |  99  |  53<H>  ----------------------------<  258<H>  4.4   |  22  |  2.26<H>    Ca    9.0      2023 06:49  Phos  5.5     11-06  Mg     2.2     11-06        PT/INR - ( 2023 03:12 )   PT: 14.2 sec;   INR: 1.30 ratio         PTT - ( 2023 10:14 )  PTT:42.2 sec      Urinalysis Basic - ( 2023 06:49 )    Color: x / Appearance: x / SG: x / pH: x  Gluc: 258 mg/dL / Ketone: x  / Bili: x / Urobili: x   Blood: x / Protein: x / Nitrite: x   Leuk Esterase: x / RBC: x / WBC x   Sq Epi: x / Non Sq Epi: x / Bacteria: x        SARS-CoV-2: NotDetec (2023 18:25)  SARS-CoV-2: NotDetec (24 Oct 2023 07:17)  SARS-CoV-2: NotDetec (20 Oct 2023 09:18)  COVID-19 PCR: NotDetec (14 Oct 2023 11:34)      RADIOLOGY & ADDITIONAL TESTS:  Results Reviewed:   Imaging Personally Reviewed:  Electrocardiogram Personally Reviewed:    COORDINATION OF CARE:  Care Discussed with Consultants/Other Providers [Y/N]:  Prior or Outpatient Records Reviewed [Y/N]:

## 2023-11-06 NOTE — PROGRESS NOTE ADULT - PROBLEM SELECTOR PLAN 2
95% discrete proximal LAD disease and sequential multifocal disease with good distal target, 80-90% proximal LCx disease just prior to marginals, severe multifocal RCA. No viability on MRI.

## 2023-11-06 NOTE — CONSULT NOTE ADULT - SUBJECTIVE AND OBJECTIVE BOX
[facility] DIVISION OF PULMONARY, CRITICAL CARE AND SLEEP MEDICINE  PULMONARY CONSULTATION NOTE  OFFICE NUMBER: [phone]    NAME: BRANDEN MARRUFO  MEDICAL RECORD NUMBER: [id number]    CHIEF COMPLAINT: Patient is a 58y old  Male who presents with a chief complaint of NSTEMI (2023 12:24)      HISTORY OF PRESENT ILLNESS:   58M DM2, HTN, HLD, prior CVA () with mild L sided weakness presenting as a transfer from [facility] for NSTEMI on 10/13/23.    The patient has a history of Mobitz II with ICD (2022) presented to [facility] with shortness of breath and chest pain. He was found to have acute hypoxemic respiratory failure due to pulmonary edema and acute on chronic decompensated heart failure with NSTEMI and AHKI. He also had an enterovirus infection at the time of admission. He was initially in the MICU at [facility] and required BIPAP but did not require intubation. He was given antibiotics for potential bacterial pneumonia and had a TTE done 23 noting an EF 20% with regional wall motion abnormalities. He was transferred to [facility] for further management.    He has undergone cardiac cath on 10/16, 10/19, and 10/30/23. On 10/16 the patient was found to have severe triple vessel disease with severely reduced LVEF. On 10/19 he underwent RHC which noted elevated filling pressures with PCWP 29, PA (s/d/m) 41/26/33 and PVR 0.91WU. On 10/30 he underwent repeat RHC  which noted PA 47/34/39, PCWP 32.    He was initially admitted to the CICU and then eventually transferred to the medical floors here at [facility]      I called patient's wife to obtain additional History    NOTE INCOMPLETE      ====================BACKGROUND INFORMATION============================    FAMILY HISTORY: FAMILY HISTORY:  Pulmonary History denied       PAST MEDICAL AND SURGICAL HISTORY: PAST MEDICAL & SURGICAL HISTORY:  CVA (cerebrovascular accident)  T2DM (type 2 diabetes mellitus)  HTN (hypertension)  HLD (hyperlipidemia)  S/P cystoscopy  S/P hernia repair          ALLERGIES:Allergies  No Known Allergies      HOME MEDICATIONS: Reviewed     OUTPATIENT PULMONARY DOCTOR: None    SOCIAL HISTORY: Additional information provided by patient's wife (Ashlee)  TOBACCO USE: Never   ALCOHOL: Denied   DRUGS: Denied   MARITAL STATUS:    EMPLOYMENT: [employer]  until 2023  EXPOSURES:   RECENT TRAVELS: [place]   PETS: None  CODE STATUS:     ====================REVIEW OF SYSTEMS=====================================  CONSTITUTIONAL: No acute complaints  CARDIOVASCULAR: Denies chest pain  PULMONARY: Denies cough or sputum production, no aspiration symptoms reported by nurse  GASTROINTESTINAL: Denies nausea   [x] ALL OTHER REVIEW OF SYSTEMS ARE NEGATIVE   [] UNABLE TO OBTAIN REVIEW OF SYSTEMS DUE TO ______________      ====================PHYSICAL EXAM=========================================    VITALS: ICU Vital Signs Last 24 Hrs  T(C): 36.7 (2023 12:51), Max: 37.1 (2023 11:49)  T(F): 98.1 (2023 12:51), Max: 98.7 (2023 11:49)  HR: 108 (2023 14:02) (106 - 114)  BP: 97/62 (2023 14:02) (94/54 - 106/70)  RR: 18 (2023 14:02) (18 - 19)  SpO2: 98% (2023 14:02) (95% - 99%)    O2 Parameters below as of 2023 14:02  Patient On (Oxygen Delivery Method): nasal cannula  O2 Flow (L/min): 3          INTAKE and OUTPUT: I&O's Summary    2023 07:01  -  2023 07:00  --------------------------------------------------------  IN: 996 mL / OUT: 0 mL / NET: 996 mL    2023 07:01  -  2023 14:43  --------------------------------------------------------  IN: 240 mL / OUT: 0 mL / NET: 240 mL      WEIGHT: Daily     Daily Weight in k (2023 08:00)    GLUCOSE: CAPILLARY BLOOD GLUCOSE  POCT Blood Glucose.: 104 mg/dL (2023 12:58)    VENTILATOR SETTINGS: N/A    GENERAL: awake, alert, interactive, but quickly forgetful  HEENT: NCAT, EOMI, anicteric, MMM, nares clear, trachea midline, no thrush  NECK: supple, +JVD  CARDIOVASCULAR: RRR, S1S2, systolic murmur   PULMONARY: decreased BS at bases, no wheeze, no accessory muscle use  ABDOMEN: distended abdomen, soft, NT  SKIN: warm and dry  EXTREMITIES: no clubbing or cyanosis   NEUROLOGIC: no focal findings, moves all extremities  PSYCHIATRIC: calm but forgetful    ====================MEDICATIONS===========================================  MEDICATIONS  (STANDING):  aspirin enteric coated 81 milliGRAM(s) Oral daily  atorvastatin 80 milliGRAM(s) Oral at bedtime  buMETAnide 2 milliGRAM(s) Oral daily  chlorhexidine 2% Cloths 1 Application(s) Topical <User Schedule>  digoxin     Tablet 125 MICROGram(s) Oral daily  heparin  Infusion 1100 Unit(s)/Hr (11 mL/Hr) IV Continuous <Continuous>  hydrALAZINE 50 milliGRAM(s) Oral two times a day  insulin glargine Injectable (LANTUS) 30 Unit(s) SubCutaneous at bedtime  insulin lispro (ADMELOG) corrective regimen sliding scale   SubCutaneous three times a day before meals  insulin lispro (ADMELOG) corrective regimen sliding scale   SubCutaneous at bedtime  insulin lispro Injectable (ADMELOG) 12 Unit(s) SubCutaneous three times a day before meals  isosorbide   dinitrate Tablet (ISORDIL) 20 milliGRAM(s) Oral three times a day  metoprolol tartrate 12.5 milliGRAM(s) Oral every 6 hours  ondansetron Injectable 4 milliGRAM(s) IV Push once  pantoprazole    Tablet 40 milliGRAM(s) Oral before breakfast  senna 2 Tablet(s) Oral at bedtime  tamsulosin 0.4 milliGRAM(s) Oral at bedtime  warfarin 7.5 milliGRAM(s) Oral once      MEDICATIONS  (PRN):  benzocaine/menthol Lozenge 1 Lozenge Oral every 2 hours PRN Sore Throat  polyethylene glycol 3350 17 Gram(s) Oral daily PRN Constipation      ====================LABORATORY RESULTS====================================  CBC Full  -  ( 2023 13:32 )  WBC Count : 7.49 K/uL  RBC Count : 3.87 M/uL  Hemoglobin : 9.9 g/dL  Hematocrit : 31.8 %  Platelet Count - Automated : 219 K/uL  Mean Cell Volume : 82.2 fl  Mean Cell Hemoglobin : 25.6 pg  Mean Cell Hemoglobin Concentration : 31.1 gm/dL  Auto Neutrophil # : 5.95 K/uL  Auto Lymphocyte # : 0.74 K/uL  Auto Monocyte # : 0.70 K/uL  Auto Eosinophil # : 0.05 K/uL  Auto Basophil # : 0.02 K/uL  Auto Neutrophil % : 79.4 %  Auto Lymphocyte % : 9.9 %  Auto Monocyte % : 9.3 %  Auto Eosinophil % : 0.7 %  Auto Basophil % : 0.3 %                                        136  |  101  |  51<H>  ----------------------------<  84  4.4   |  20<L>  |  2.16<H>    Ca    8.7      2023 13:32  Phos  5.7     11-06  Mg     2.2     11-    TPro  5.8<L>  /  Alb  3.0<L>  /  TBili  0.2  /  DBili  x   /  AST  14  /  ALT  36  /  AlkPhos  89  11-        PT/INR - ( 2023 13:32 )   PT: 15.3 sec;   INR: 1.47 ratio         PTT - ( 2023 13:32 )  PTT:41.1 sec    Creatinine Trend: 2.16<--, 2.26<--, 2.07<--, 2.00<--, 2.05<--, 2.05<--      ====================RADIOLOGY and ECHOCARDIOGRAPHY=======================    CXR:    CT CHEST: < from: CT Chest No Cont (23 @ 21:26) >  FINDINGS:    LYMPH NODES: Subcentimeter short axis prevascular and AP window nodes. No   supraclavicular, axillary, or hilar lymphadenopathy.    HEART/VASCULATURE: Stable cardiomegaly. Trace pericardial fluid. Coronary   artery calcifications. Aortic calcifications. Left chest wall AICD with   leads in right atrium and right ventricle.    AIRWAYS/LUNGS/PLEURA: Central tracheobronchial tree is patent. There is   mild bronchiectasis, most prominent in the upper lobes. There are left   greater than right peribronchovascular groundglass opacities and small   consolidations in the upper lobes. Patchy bilateral upper lobe   groundglass opacities increased from prior exam. 6 mm lingular nodule is   unchanged. Moderate bilateral pleural effusions with adjacent   atelectasis, increased from prior exam.    UPPER ABDOMEN: Unremarkable.    BONES/SOFT TISSUES: Degenerative changes of the spine. T9 vertebral body   hemangioma. Enlarged and nodular thyroid gland extends into the superior   mediastinum.    IMPRESSION:    Interval increase in bilateral peribronchovascular groundglass opacities   and small consolidations suggesting of multifocal pneumonia.  Increased bilateral pleural effusions.    < end of copied text >      ECHOCARDIOGRAPHY: < from: TTE Limited W or WO Ultrasound Enhancing Agent (10.26.23 @ 12:54) >  CONCLUSIONS:      1. Left ventricular systolic function is severely decreased with an ejection fraction visually estimated at <20 %.   2. Systolic function.   3. LV apical thrombus is seen.      Limited study.   4. Findings were discussed with Yenni LAFLEUR on 10/26/2023 at 1.29pm.   5. Small pericardial effusion noted adjacent to the right ventricle with no evidence of hemodynamic compromise.   6. There is a left ventricular thrombus.   7. There is normal LV mass and normal geometry.    ________________________________________________________________________________________  FINDINGS:     Left Ventricle:  Left ventricular systolic function is severely decreased with an ejection fraction visually estimated at <20%. There is global left ventricular hypokinesis. There is normal LV mass and normal geometry. There is a left ventricular thrombus.     Right Ventricle:  Normal systolic function. A device lead is visualized.     Aortic Valve:  The aortic valve was not well visualized.     Mitral Valve:  There is symmetric leaflet tethering. There is mild to moderate mitral regurgitation.     Pericardium:  There is a small pericardial effusion noted adjacent to the right ventricle with no evidence of hemodynamic compromise.     Pleura:  Right pleural effusion noted.    < end of copied text >       CARDIAC CATH: < from: Cardiac Catheterization (10.30.23 @ 17:16) >  RHC:   RA 14/13/12, RV 51/14, PA 47/34/39, PCW 34/36/32, CI 2.5/CO4.95     < end of copied text >   [facility] DIVISION OF PULMONARY, CRITICAL CARE AND SLEEP MEDICINE  PULMONARY CONSULTATION NOTE  OFFICE NUMBER: [phone]    NAME: BRANDEN MARRUFO  MEDICAL RECORD NUMBER: [id number]    CHIEF COMPLAINT: Patient is a 58y old  Male who presents with a chief complaint of NSTEMI (2023 12:24)      HISTORY OF PRESENT ILLNESS:   58M DM2, HTN, HLD, prior CVA () with mild L sided weakness presenting as a transfer from [facility] for NSTEMI on 10/13/23.    The patient has a history of Mobitz II with ICD (2022) presented to [facility] with shortness of breath and chest pain. He was found to have acute hypoxemic respiratory failure due to pulmonary edema and acute on chronic decompensated heart failure with NSTEMI and AHKI. He also had an enterovirus infection at the time of admission. He was initially in the MICU at [facility] and required BIPAP but did not require intubation. He was given antibiotics for potential bacterial pneumonia and had a TTE done 23 noting an EF 20% with regional wall motion abnormalities. He was transferred to [facility] for further management.    He has undergone cardiac cath on 10/16, 10/19, and 10/30/23. On 10/16 the patient was found to have severe triple vessel disease with severely reduced LVEF. On 10/19 he underwent RHC which noted elevated filling pressures with PCWP 29, PA (s/d/m) 41/26/33 and PVR 0.91WU. On 10/30 he underwent repeat RHC  which noted PA 47/34/39, PCWP 32.    During hospitalization he has been in the CICU and is now transferred to the medical services for further monitoring and care. His hospital course has also been complicated by finding of a right medial cerebellar infarct.   - She notes that since his stroke in  he has had mild forgetfulness but not to the point it has been recently and that he has had some left side weakness chronically but it has not impacted his ability to continue to work as an [employer] .  - Today patient had an RRT for hypotension and it was converted to a code stroke. Neurologic imaging was done but has not yet resulted officially at time of my evaluation    Last VBG from  did not reveal hypercapnia or an elevated lactate (1.2).    I called patient's wife, Mame Marquez, to obtain additional History    Patient is originally from [place] and came to [place] 25 years ago. He is a never smoker. He has no prior reported history of lung disease. He has never required pulmonary care prior and has not had any hospitalizations for pulmonary issues.     ====================BACKGROUND INFORMATION============================    FAMILY HISTORY: FAMILY HISTORY:  Pulmonary History denied       PAST MEDICAL AND SURGICAL HISTORY: PAST MEDICAL & SURGICAL HISTORY:  CVA (cerebrovascular accident)  T2DM (type 2 diabetes mellitus)  HTN (hypertension)  HLD (hyperlipidemia)  S/P cystoscopy  S/P hernia repair        ALLERGIES:Allergies  No Known Allergies      HOME MEDICATIONS: Reviewed     OUTPATIENT PULMONARY DOCTOR: None    SOCIAL HISTORY: Additional information provided by patient's wife (Ashlee)  TOBACCO USE: Never   ALCOHOL: Denied   DRUGS: Denied   MARITAL STATUS:    EMPLOYMENT: [employer]  until 2023  EXPOSURES:   RECENT TRAVELS: [place]   PETS: None  CODE STATUS:     ====================REVIEW OF SYSTEMS=====================================  CONSTITUTIONAL: No acute complaints  CARDIOVASCULAR: Denies chest pain  PULMONARY: Denies cough or sputum production, no aspiration symptoms reported by nurse  GASTROINTESTINAL: Denies nausea   [x] ALL OTHER REVIEW OF SYSTEMS ARE NEGATIVE   [] UNABLE TO OBTAIN REVIEW OF SYSTEMS DUE TO ______________      ====================PHYSICAL EXAM=========================================    VITALS: ICU Vital Signs Last 24 Hrs  T(C): 36.7 (2023 12:51), Max: 37.1 (2023 11:49)  T(F): 98.1 (2023 12:51), Max: 98.7 (2023 11:49)  HR: 108 (2023 14:02) (106 - 114)  BP: 97/62 (2023 14:02) (94/54 - 106/70)  RR: 18 (2023 14:02) (18 - 19)  SpO2: 98% (2023 14:02) (95% - 99%)    O2 Parameters below as of 2023 14:02  Patient On (Oxygen Delivery Method): nasal cannula  O2 Flow (L/min): 3      INTAKE and OUTPUT: I&O's Summary  2023 07:01  -  2023 07:00  --------------------------------------------------------  IN: 996 mL / OUT: 0 mL / NET: 996 mL    2023 07:01  -  2023 14:43  --------------------------------------------------------  IN: 240 mL / OUT: 0 mL / NET: 240 mL      WEIGHT: Daily     Daily Weight in k (2023 08:00)    GLUCOSE: CAPILLARY BLOOD GLUCOSE  POCT Blood Glucose.: 104 mg/dL (2023 12:58)    VENTILATOR SETTINGS: N/A    GENERAL: awake, alert, interactive, but quickly forgetful  HEENT: NCAT, EOMI, anicteric, MMM, nares clear, trachea midline, no thrush  NECK: supple, +JVD  CARDIOVASCULAR: RRR, S1S2, systolic murmur   PULMONARY: decreased BS at bases, no wheeze, no accessory muscle use  ABDOMEN: distended abdomen, soft, NT  SKIN: warm and dry  EXTREMITIES: no clubbing or cyanosis   NEUROLOGIC: no focal findings, moves all extremities  PSYCHIATRIC: calm but forgetful    ====================MEDICATIONS===========================================  MEDICATIONS  (STANDING):  aspirin enteric coated 81 milliGRAM(s) Oral daily  atorvastatin 80 milliGRAM(s) Oral at bedtime  buMETAnide 2 milliGRAM(s) Oral daily  chlorhexidine 2% Cloths 1 Application(s) Topical <User Schedule>  digoxin     Tablet 125 MICROGram(s) Oral daily  heparin  Infusion 1100 Unit(s)/Hr (11 mL/Hr) IV Continuous <Continuous>  hydrALAZINE 50 milliGRAM(s) Oral two times a day  insulin glargine Injectable (LANTUS) 30 Unit(s) SubCutaneous at bedtime  insulin lispro (ADMELOG) corrective regimen sliding scale   SubCutaneous three times a day before meals  insulin lispro (ADMELOG) corrective regimen sliding scale   SubCutaneous at bedtime  insulin lispro Injectable (ADMELOG) 12 Unit(s) SubCutaneous three times a day before meals  isosorbide   dinitrate Tablet (ISORDIL) 20 milliGRAM(s) Oral three times a day  metoprolol tartrate 12.5 milliGRAM(s) Oral every 6 hours  ondansetron Injectable 4 milliGRAM(s) IV Push once  pantoprazole    Tablet 40 milliGRAM(s) Oral before breakfast  senna 2 Tablet(s) Oral at bedtime  tamsulosin 0.4 milliGRAM(s) Oral at bedtime  warfarin 7.5 milliGRAM(s) Oral once      MEDICATIONS  (PRN):  benzocaine/menthol Lozenge 1 Lozenge Oral every 2 hours PRN Sore Throat  polyethylene glycol 3350 17 Gram(s) Oral daily PRN Constipation      ====================LABORATORY RESULTS====================================  CBC Full  -  ( 2023 13:32 )  WBC Count : 7.49 K/uL  RBC Count : 3.87 M/uL  Hemoglobin : 9.9 g/dL  Hematocrit : 31.8 %  Platelet Count - Automated : 219 K/uL  Mean Cell Volume : 82.2 fl  Mean Cell Hemoglobin : 25.6 pg  Mean Cell Hemoglobin Concentration : 31.1 gm/dL  Auto Neutrophil # : 5.95 K/uL  Auto Lymphocyte # : 0.74 K/uL  Auto Monocyte # : 0.70 K/uL  Auto Eosinophil # : 0.05 K/uL  Auto Basophil # : 0.02 K/uL  Auto Neutrophil % : 79.4 %  Auto Lymphocyte % : 9.9 %  Auto Monocyte % : 9.3 %  Auto Eosinophil % : 0.7 %  Auto Basophil % : 0.3 %                                        136  |  101  |  51<H>  ----------------------------<  84  4.4   |  20<L>  |  2.16<H>    Ca    8.7      2023 13:32  Phos  5.7       Mg     2.2         TPro  5.8<L>  /  Alb  3.0<L>  /  TBili  0.2  /  DBili  x   /  AST  14  /  ALT  36  /  AlkPhos  89          PT/INR - ( 2023 13:32 )   PT: 15.3 sec;   INR: 1.47 ratio         PTT - ( 2023 13:32 )  PTT:41.1 sec    Creatinine Trend: 2.16<--, 2.26<--, 2.07<--, 2.00<--, 2.05<--, 2.05<--        ====================RADIOLOGY and ECHOCARDIOGRAPHY=======================    CXR:    CT CHEST: < from: CT Chest No Cont (23 @ 21:26) >  FINDINGS:    LYMPH NODES: Subcentimeter short axis prevascular and AP window nodes. No   supraclavicular, axillary, or hilar lymphadenopathy.    HEART/VASCULATURE: Stable cardiomegaly. Trace pericardial fluid. Coronary   artery calcifications. Aortic calcifications. Left chest wall AICD with   leads in right atrium and right ventricle.    AIRWAYS/LUNGS/PLEURA: Central tracheobronchial tree is patent. There is   mild bronchiectasis, most prominent in the upper lobes. There are left   greater than right peribronchovascular groundglass opacities and small   consolidations in the upper lobes. Patchy bilateral upper lobe   groundglass opacities increased from prior exam. 6 mm lingular nodule is   unchanged. Moderate bilateral pleural effusions with adjacent   atelectasis, increased from prior exam.    UPPER ABDOMEN: Unremarkable.    BONES/SOFT TISSUES: Degenerative changes of the spine. T9 vertebral body   hemangioma. Enlarged and nodular thyroid gland extends into the superior   mediastinum.    IMPRESSION:    Interval increase in bilateral peribronchovascular groundglass opacities   and small consolidations suggesting of multifocal pneumonia.  Increased bilateral pleural effusions.    < end of copied text >      ECHOCARDIOGRAPHY: < from: TTE Limited W or WO Ultrasound Enhancing Agent (10.26.23 @ 12:54) >  CONCLUSIONS:      1. Left ventricular systolic function is severely decreased with an ejection fraction visually estimated at <20 %.   2. Systolic function.   3. LV apical thrombus is seen.      Limited study.   4. Findings were discussed with Yenni LAFLEUR on 10/26/2023 at 1.29pm.   5. Small pericardial effusion noted adjacent to the right ventricle with no evidence of hemodynamic compromise.   6. There is a left ventricular thrombus.   7. There is normal LV mass and normal geometry.    ________________________________________________________________________________________  FINDINGS:     Left Ventricle:  Left ventricular systolic function is severely decreased with an ejection fraction visually estimated at <20%. There is global left ventricular hypokinesis. There is normal LV mass and normal geometry. There is a left ventricular thrombus.     Right Ventricle:  Normal systolic function. A device lead is visualized.     Aortic Valve:  The aortic valve was not well visualized.     Mitral Valve:  There is symmetric leaflet tethering. There is mild to moderate mitral regurgitation.     Pericardium:  There is a small pericardial effusion noted adjacent to the right ventricle with no evidence of hemodynamic compromise.     Pleura:  Right pleural effusion noted.    < end of copied text >       CARDIAC CATH: < from: Cardiac Catheterization (10.30.23 @ 17:16) >  RHC:   RA 14/13/12, RV 51/14, PA 47/34/39, PCW 34/36/32, CI 2.5/CO4.95     < end of copied text >

## 2023-11-06 NOTE — PROGRESS NOTE ADULT - ASSESSMENT
58M with PMH of CVA with mild left sided deficits, HTN, DM2, vertigo, HLD, Mobitz II s/p [employer] dual chamber ICD (12/21/22) initially presented to OSH for SOB c/f ADHF vs. PNA, later found to have NSTEMI transferred to [facility] for LHC evaluation. 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 Admitted to CICU for hypotension, inotropic support, hemodynamic monitoring. Previously downgraded on 10/22, RRT called while on the floor for AMS and hypotensive to 80/40, CTH 10/27 found possible R cerebellar infarct, s/p re-transfer to CICU for inotropic support. Pt now stabilized and transferred back to medicine

## 2023-11-07 LAB
ALBUMIN SERPL ELPH-MCNC: 3.1 G/DL — LOW (ref 3.3–5)
ALBUMIN SERPL ELPH-MCNC: 3.1 G/DL — LOW (ref 3.3–5)
ALBUMIN SERPL ELPH-MCNC: 3.4 G/DL — SIGNIFICANT CHANGE UP (ref 3.3–5)
ALBUMIN SERPL ELPH-MCNC: 3.4 G/DL — SIGNIFICANT CHANGE UP (ref 3.3–5)
ALP SERPL-CCNC: 101 U/L — SIGNIFICANT CHANGE UP (ref 40–120)
ALP SERPL-CCNC: 101 U/L — SIGNIFICANT CHANGE UP (ref 40–120)
ALP SERPL-CCNC: 92 U/L — SIGNIFICANT CHANGE UP (ref 40–120)
ALP SERPL-CCNC: 92 U/L — SIGNIFICANT CHANGE UP (ref 40–120)
ALT FLD-CCNC: 38 U/L — SIGNIFICANT CHANGE UP (ref 10–45)
ALT FLD-CCNC: 38 U/L — SIGNIFICANT CHANGE UP (ref 10–45)
ALT FLD-CCNC: 39 U/L — SIGNIFICANT CHANGE UP (ref 10–45)
ALT FLD-CCNC: 39 U/L — SIGNIFICANT CHANGE UP (ref 10–45)
ANION GAP SERPL CALC-SCNC: 13 MMOL/L — SIGNIFICANT CHANGE UP (ref 5–17)
ANION GAP SERPL CALC-SCNC: 13 MMOL/L — SIGNIFICANT CHANGE UP (ref 5–17)
ANION GAP SERPL CALC-SCNC: 15 MMOL/L — SIGNIFICANT CHANGE UP (ref 5–17)
ANION GAP SERPL CALC-SCNC: 15 MMOL/L — SIGNIFICANT CHANGE UP (ref 5–17)
APTT BLD: 118.1 SEC — HIGH (ref 24.5–35.6)
APTT BLD: 118.1 SEC — HIGH (ref 24.5–35.6)
APTT BLD: 78 SEC — HIGH (ref 24.5–35.6)
APTT BLD: 78 SEC — HIGH (ref 24.5–35.6)
APTT BLD: 87.1 SEC — HIGH (ref 24.5–35.6)
APTT BLD: 87.1 SEC — HIGH (ref 24.5–35.6)
AST SERPL-CCNC: 19 U/L — SIGNIFICANT CHANGE UP (ref 10–40)
AST SERPL-CCNC: 19 U/L — SIGNIFICANT CHANGE UP (ref 10–40)
AST SERPL-CCNC: 22 U/L — SIGNIFICANT CHANGE UP (ref 10–40)
AST SERPL-CCNC: 22 U/L — SIGNIFICANT CHANGE UP (ref 10–40)
BASE EXCESS BLDV CALC-SCNC: -2.8 MMOL/L — LOW (ref -2–3)
BASE EXCESS BLDV CALC-SCNC: -2.8 MMOL/L — LOW (ref -2–3)
BASOPHILS # BLD AUTO: 0.01 K/UL — SIGNIFICANT CHANGE UP (ref 0–0.2)
BASOPHILS # BLD AUTO: 0.01 K/UL — SIGNIFICANT CHANGE UP (ref 0–0.2)
BASOPHILS # BLD AUTO: 0.02 K/UL — SIGNIFICANT CHANGE UP (ref 0–0.2)
BASOPHILS # BLD AUTO: 0.02 K/UL — SIGNIFICANT CHANGE UP (ref 0–0.2)
BASOPHILS NFR BLD AUTO: 0.2 % — SIGNIFICANT CHANGE UP (ref 0–2)
BASOPHILS NFR BLD AUTO: 0.2 % — SIGNIFICANT CHANGE UP (ref 0–2)
BASOPHILS NFR BLD AUTO: 0.3 % — SIGNIFICANT CHANGE UP (ref 0–2)
BASOPHILS NFR BLD AUTO: 0.3 % — SIGNIFICANT CHANGE UP (ref 0–2)
BILIRUB SERPL-MCNC: 0.4 MG/DL — SIGNIFICANT CHANGE UP (ref 0.2–1.2)
BUN SERPL-MCNC: 47 MG/DL — HIGH (ref 7–23)
CALCIUM SERPL-MCNC: 8.6 MG/DL — SIGNIFICANT CHANGE UP (ref 8.4–10.5)
CALCIUM SERPL-MCNC: 8.6 MG/DL — SIGNIFICANT CHANGE UP (ref 8.4–10.5)
CALCIUM SERPL-MCNC: 8.8 MG/DL — SIGNIFICANT CHANGE UP (ref 8.4–10.5)
CALCIUM SERPL-MCNC: 8.8 MG/DL — SIGNIFICANT CHANGE UP (ref 8.4–10.5)
CHLORIDE SERPL-SCNC: 100 MMOL/L — SIGNIFICANT CHANGE UP (ref 96–108)
CHLORIDE SERPL-SCNC: 100 MMOL/L — SIGNIFICANT CHANGE UP (ref 96–108)
CHLORIDE SERPL-SCNC: 101 MMOL/L — SIGNIFICANT CHANGE UP (ref 96–108)
CHLORIDE SERPL-SCNC: 101 MMOL/L — SIGNIFICANT CHANGE UP (ref 96–108)
CK MB BLD-MCNC: 6.9 % — HIGH (ref 0–3.5)
CK MB BLD-MCNC: 6.9 % — HIGH (ref 0–3.5)
CK MB CFR SERPL CALC: 3.4 NG/ML — SIGNIFICANT CHANGE UP (ref 0–6.7)
CK MB CFR SERPL CALC: 3.4 NG/ML — SIGNIFICANT CHANGE UP (ref 0–6.7)
CK SERPL-CCNC: 49 U/L — SIGNIFICANT CHANGE UP (ref 30–200)
CK SERPL-CCNC: 49 U/L — SIGNIFICANT CHANGE UP (ref 30–200)
CO2 BLDV-SCNC: 27 MMOL/L — HIGH (ref 22–26)
CO2 BLDV-SCNC: 27 MMOL/L — HIGH (ref 22–26)
CO2 SERPL-SCNC: 21 MMOL/L — LOW (ref 22–31)
CO2 SERPL-SCNC: 21 MMOL/L — LOW (ref 22–31)
CO2 SERPL-SCNC: 22 MMOL/L — SIGNIFICANT CHANGE UP (ref 22–31)
CO2 SERPL-SCNC: 22 MMOL/L — SIGNIFICANT CHANGE UP (ref 22–31)
CREAT SERPL-MCNC: 2.21 MG/DL — HIGH (ref 0.5–1.3)
CREAT SERPL-MCNC: 2.21 MG/DL — HIGH (ref 0.5–1.3)
CREAT SERPL-MCNC: 2.24 MG/DL — HIGH (ref 0.5–1.3)
CREAT SERPL-MCNC: 2.24 MG/DL — HIGH (ref 0.5–1.3)
EGFR: 34 ML/MIN/1.73M2 — LOW
EOSINOPHIL # BLD AUTO: 0.01 K/UL — SIGNIFICANT CHANGE UP (ref 0–0.5)
EOSINOPHIL NFR BLD AUTO: 0.1 % — SIGNIFICANT CHANGE UP (ref 0–6)
EOSINOPHIL NFR BLD AUTO: 0.1 % — SIGNIFICANT CHANGE UP (ref 0–6)
EOSINOPHIL NFR BLD AUTO: 0.2 % — SIGNIFICANT CHANGE UP (ref 0–6)
EOSINOPHIL NFR BLD AUTO: 0.2 % — SIGNIFICANT CHANGE UP (ref 0–6)
GLUCOSE BLDC GLUCOMTR-MCNC: 159 MG/DL — HIGH (ref 70–99)
GLUCOSE BLDC GLUCOMTR-MCNC: 159 MG/DL — HIGH (ref 70–99)
GLUCOSE BLDC GLUCOMTR-MCNC: 165 MG/DL — HIGH (ref 70–99)
GLUCOSE BLDC GLUCOMTR-MCNC: 165 MG/DL — HIGH (ref 70–99)
GLUCOSE BLDC GLUCOMTR-MCNC: 184 MG/DL — HIGH (ref 70–99)
GLUCOSE BLDC GLUCOMTR-MCNC: 184 MG/DL — HIGH (ref 70–99)
GLUCOSE BLDC GLUCOMTR-MCNC: 242 MG/DL — HIGH (ref 70–99)
GLUCOSE BLDC GLUCOMTR-MCNC: 242 MG/DL — HIGH (ref 70–99)
GLUCOSE BLDC GLUCOMTR-MCNC: 46 MG/DL — CRITICAL LOW (ref 70–99)
GLUCOSE BLDC GLUCOMTR-MCNC: 46 MG/DL — CRITICAL LOW (ref 70–99)
GLUCOSE BLDC GLUCOMTR-MCNC: 49 MG/DL — CRITICAL LOW (ref 70–99)
GLUCOSE BLDC GLUCOMTR-MCNC: 49 MG/DL — CRITICAL LOW (ref 70–99)
GLUCOSE BLDC GLUCOMTR-MCNC: 79 MG/DL — SIGNIFICANT CHANGE UP (ref 70–99)
GLUCOSE BLDC GLUCOMTR-MCNC: 79 MG/DL — SIGNIFICANT CHANGE UP (ref 70–99)
GLUCOSE SERPL-MCNC: 115 MG/DL — HIGH (ref 70–99)
GLUCOSE SERPL-MCNC: 115 MG/DL — HIGH (ref 70–99)
GLUCOSE SERPL-MCNC: 69 MG/DL — LOW (ref 70–99)
GLUCOSE SERPL-MCNC: 69 MG/DL — LOW (ref 70–99)
GRAM STN FLD: ABNORMAL
GRAM STN FLD: ABNORMAL
HCO3 BLDV-SCNC: 25 MMOL/L — SIGNIFICANT CHANGE UP (ref 22–29)
HCO3 BLDV-SCNC: 25 MMOL/L — SIGNIFICANT CHANGE UP (ref 22–29)
HCT VFR BLD CALC: 30.9 % — LOW (ref 39–50)
HCT VFR BLD CALC: 30.9 % — LOW (ref 39–50)
HCT VFR BLD CALC: 36.3 % — LOW (ref 39–50)
HCT VFR BLD CALC: 36.3 % — LOW (ref 39–50)
HGB BLD-MCNC: 11.1 G/DL — LOW (ref 13–17)
HGB BLD-MCNC: 11.1 G/DL — LOW (ref 13–17)
HGB BLD-MCNC: 9.4 G/DL — LOW (ref 13–17)
HGB BLD-MCNC: 9.4 G/DL — LOW (ref 13–17)
IMM GRANULOCYTES NFR BLD AUTO: 0.3 % — SIGNIFICANT CHANGE UP (ref 0–0.9)
IMM GRANULOCYTES NFR BLD AUTO: 0.3 % — SIGNIFICANT CHANGE UP (ref 0–0.9)
IMM GRANULOCYTES NFR BLD AUTO: 0.5 % — SIGNIFICANT CHANGE UP (ref 0–0.9)
IMM GRANULOCYTES NFR BLD AUTO: 0.5 % — SIGNIFICANT CHANGE UP (ref 0–0.9)
INR BLD: 1.59 RATIO — HIGH (ref 0.85–1.18)
INR BLD: 1.59 RATIO — HIGH (ref 0.85–1.18)
LACTATE BLDV-MCNC: 2.5 MMOL/L — HIGH (ref 0.5–2)
LACTATE BLDV-MCNC: 2.5 MMOL/L — HIGH (ref 0.5–2)
LACTATE SERPL-SCNC: 0.9 MMOL/L — SIGNIFICANT CHANGE UP (ref 0.5–2)
LACTATE SERPL-SCNC: 0.9 MMOL/L — SIGNIFICANT CHANGE UP (ref 0.5–2)
LEGIONELLA AG UR QL: NEGATIVE — SIGNIFICANT CHANGE UP
LEGIONELLA AG UR QL: NEGATIVE — SIGNIFICANT CHANGE UP
LYMPHOCYTES # BLD AUTO: 0.43 K/UL — LOW (ref 1–3.3)
LYMPHOCYTES # BLD AUTO: 0.43 K/UL — LOW (ref 1–3.3)
LYMPHOCYTES # BLD AUTO: 0.64 K/UL — LOW (ref 1–3.3)
LYMPHOCYTES # BLD AUTO: 0.64 K/UL — LOW (ref 1–3.3)
LYMPHOCYTES # BLD AUTO: 6.5 % — LOW (ref 13–44)
LYMPHOCYTES # BLD AUTO: 6.5 % — LOW (ref 13–44)
LYMPHOCYTES # BLD AUTO: 8.4 % — LOW (ref 13–44)
LYMPHOCYTES # BLD AUTO: 8.4 % — LOW (ref 13–44)
MAGNESIUM SERPL-MCNC: 2.3 MG/DL — SIGNIFICANT CHANGE UP (ref 1.6–2.6)
MAGNESIUM SERPL-MCNC: 2.3 MG/DL — SIGNIFICANT CHANGE UP (ref 1.6–2.6)
MCHC RBC-ENTMCNC: 24.9 PG — LOW (ref 27–34)
MCHC RBC-ENTMCNC: 24.9 PG — LOW (ref 27–34)
MCHC RBC-ENTMCNC: 25.5 PG — LOW (ref 27–34)
MCHC RBC-ENTMCNC: 25.5 PG — LOW (ref 27–34)
MCHC RBC-ENTMCNC: 30.4 GM/DL — LOW (ref 32–36)
MCHC RBC-ENTMCNC: 30.4 GM/DL — LOW (ref 32–36)
MCHC RBC-ENTMCNC: 30.6 GM/DL — LOW (ref 32–36)
MCHC RBC-ENTMCNC: 30.6 GM/DL — LOW (ref 32–36)
MCV RBC AUTO: 81.7 FL — SIGNIFICANT CHANGE UP (ref 80–100)
MCV RBC AUTO: 81.7 FL — SIGNIFICANT CHANGE UP (ref 80–100)
MCV RBC AUTO: 83.4 FL — SIGNIFICANT CHANGE UP (ref 80–100)
MCV RBC AUTO: 83.4 FL — SIGNIFICANT CHANGE UP (ref 80–100)
MONOCYTES # BLD AUTO: 0.37 K/UL — SIGNIFICANT CHANGE UP (ref 0–0.9)
MONOCYTES # BLD AUTO: 0.37 K/UL — SIGNIFICANT CHANGE UP (ref 0–0.9)
MONOCYTES # BLD AUTO: 0.65 K/UL — SIGNIFICANT CHANGE UP (ref 0–0.9)
MONOCYTES # BLD AUTO: 0.65 K/UL — SIGNIFICANT CHANGE UP (ref 0–0.9)
MONOCYTES NFR BLD AUTO: 5.6 % — SIGNIFICANT CHANGE UP (ref 2–14)
MONOCYTES NFR BLD AUTO: 5.6 % — SIGNIFICANT CHANGE UP (ref 2–14)
MONOCYTES NFR BLD AUTO: 8.6 % — SIGNIFICANT CHANGE UP (ref 2–14)
MONOCYTES NFR BLD AUTO: 8.6 % — SIGNIFICANT CHANGE UP (ref 2–14)
MRSA PCR RESULT.: SIGNIFICANT CHANGE UP
MRSA PCR RESULT.: SIGNIFICANT CHANGE UP
NEUTROPHILS # BLD AUTO: 5.8 K/UL — SIGNIFICANT CHANGE UP (ref 1.8–7.4)
NEUTROPHILS # BLD AUTO: 5.8 K/UL — SIGNIFICANT CHANGE UP (ref 1.8–7.4)
NEUTROPHILS # BLD AUTO: 6.22 K/UL — SIGNIFICANT CHANGE UP (ref 1.8–7.4)
NEUTROPHILS # BLD AUTO: 6.22 K/UL — SIGNIFICANT CHANGE UP (ref 1.8–7.4)
NEUTROPHILS NFR BLD AUTO: 82.1 % — HIGH (ref 43–77)
NEUTROPHILS NFR BLD AUTO: 82.1 % — HIGH (ref 43–77)
NEUTROPHILS NFR BLD AUTO: 87.2 % — HIGH (ref 43–77)
NEUTROPHILS NFR BLD AUTO: 87.2 % — HIGH (ref 43–77)
NRBC # BLD: 0 /100 WBCS — SIGNIFICANT CHANGE UP (ref 0–0)
PCO2 BLDV: 59 MMHG — HIGH (ref 42–55)
PCO2 BLDV: 59 MMHG — HIGH (ref 42–55)
PH BLDV: 7.24 — LOW (ref 7.32–7.43)
PH BLDV: 7.24 — LOW (ref 7.32–7.43)
PHOSPHATE SERPL-MCNC: 5.5 MG/DL — HIGH (ref 2.5–4.5)
PHOSPHATE SERPL-MCNC: 5.5 MG/DL — HIGH (ref 2.5–4.5)
PLATELET # BLD AUTO: 211 K/UL — SIGNIFICANT CHANGE UP (ref 150–400)
PLATELET # BLD AUTO: 211 K/UL — SIGNIFICANT CHANGE UP (ref 150–400)
PLATELET # BLD AUTO: 237 K/UL — SIGNIFICANT CHANGE UP (ref 150–400)
PLATELET # BLD AUTO: 237 K/UL — SIGNIFICANT CHANGE UP (ref 150–400)
PO2 BLDV: 35 MMHG — SIGNIFICANT CHANGE UP (ref 25–45)
PO2 BLDV: 35 MMHG — SIGNIFICANT CHANGE UP (ref 25–45)
POTASSIUM SERPL-MCNC: 4.2 MMOL/L — SIGNIFICANT CHANGE UP (ref 3.5–5.3)
POTASSIUM SERPL-MCNC: 4.2 MMOL/L — SIGNIFICANT CHANGE UP (ref 3.5–5.3)
POTASSIUM SERPL-MCNC: 4.3 MMOL/L — SIGNIFICANT CHANGE UP (ref 3.5–5.3)
POTASSIUM SERPL-MCNC: 4.3 MMOL/L — SIGNIFICANT CHANGE UP (ref 3.5–5.3)
POTASSIUM SERPL-SCNC: 4.2 MMOL/L — SIGNIFICANT CHANGE UP (ref 3.5–5.3)
POTASSIUM SERPL-SCNC: 4.2 MMOL/L — SIGNIFICANT CHANGE UP (ref 3.5–5.3)
POTASSIUM SERPL-SCNC: 4.3 MMOL/L — SIGNIFICANT CHANGE UP (ref 3.5–5.3)
POTASSIUM SERPL-SCNC: 4.3 MMOL/L — SIGNIFICANT CHANGE UP (ref 3.5–5.3)
PROCALCITONIN SERPL-MCNC: 0.1 NG/ML — SIGNIFICANT CHANGE UP (ref 0.02–0.1)
PROCALCITONIN SERPL-MCNC: 0.1 NG/ML — SIGNIFICANT CHANGE UP (ref 0.02–0.1)
PROT SERPL-MCNC: 5.8 G/DL — LOW (ref 6–8.3)
PROT SERPL-MCNC: 5.8 G/DL — LOW (ref 6–8.3)
PROT SERPL-MCNC: 6.5 G/DL — SIGNIFICANT CHANGE UP (ref 6–8.3)
PROT SERPL-MCNC: 6.5 G/DL — SIGNIFICANT CHANGE UP (ref 6–8.3)
PROTHROM AB SERPL-ACNC: 16.5 SEC — HIGH (ref 9.5–13)
PROTHROM AB SERPL-ACNC: 16.5 SEC — HIGH (ref 9.5–13)
RBC # BLD: 3.78 M/UL — LOW (ref 4.2–5.8)
RBC # BLD: 3.78 M/UL — LOW (ref 4.2–5.8)
RBC # BLD: 4.35 M/UL — SIGNIFICANT CHANGE UP (ref 4.2–5.8)
RBC # BLD: 4.35 M/UL — SIGNIFICANT CHANGE UP (ref 4.2–5.8)
RBC # FLD: 15.8 % — HIGH (ref 10.3–14.5)
RBC # FLD: 15.8 % — HIGH (ref 10.3–14.5)
RBC # FLD: 15.9 % — HIGH (ref 10.3–14.5)
RBC # FLD: 15.9 % — HIGH (ref 10.3–14.5)
S AUREUS DNA NOSE QL NAA+PROBE: SIGNIFICANT CHANGE UP
S AUREUS DNA NOSE QL NAA+PROBE: SIGNIFICANT CHANGE UP
S PNEUM AG UR QL: NEGATIVE — SIGNIFICANT CHANGE UP
S PNEUM AG UR QL: NEGATIVE — SIGNIFICANT CHANGE UP
SAO2 % BLDV: 47.1 % — LOW (ref 67–88)
SAO2 % BLDV: 47.1 % — LOW (ref 67–88)
SODIUM SERPL-SCNC: 135 MMOL/L — SIGNIFICANT CHANGE UP (ref 135–145)
SODIUM SERPL-SCNC: 135 MMOL/L — SIGNIFICANT CHANGE UP (ref 135–145)
SODIUM SERPL-SCNC: 137 MMOL/L — SIGNIFICANT CHANGE UP (ref 135–145)
SODIUM SERPL-SCNC: 137 MMOL/L — SIGNIFICANT CHANGE UP (ref 135–145)
SPECIMEN SOURCE: SIGNIFICANT CHANGE UP
SPECIMEN SOURCE: SIGNIFICANT CHANGE UP
TROPONIN T, HIGH SENSITIVITY RESULT: 550 NG/L — HIGH (ref 0–51)
TROPONIN T, HIGH SENSITIVITY RESULT: 550 NG/L — HIGH (ref 0–51)
WBC # BLD: 6.64 K/UL — SIGNIFICANT CHANGE UP (ref 3.8–10.5)
WBC # BLD: 6.64 K/UL — SIGNIFICANT CHANGE UP (ref 3.8–10.5)
WBC # BLD: 7.58 K/UL — SIGNIFICANT CHANGE UP (ref 3.8–10.5)
WBC # BLD: 7.58 K/UL — SIGNIFICANT CHANGE UP (ref 3.8–10.5)
WBC # FLD AUTO: 6.64 K/UL — SIGNIFICANT CHANGE UP (ref 3.8–10.5)
WBC # FLD AUTO: 6.64 K/UL — SIGNIFICANT CHANGE UP (ref 3.8–10.5)
WBC # FLD AUTO: 7.58 K/UL — SIGNIFICANT CHANGE UP (ref 3.8–10.5)
WBC # FLD AUTO: 7.58 K/UL — SIGNIFICANT CHANGE UP (ref 3.8–10.5)

## 2023-11-07 PROCEDURE — 99232 SBSQ HOSP IP/OBS MODERATE 35: CPT

## 2023-11-07 PROCEDURE — 99233 SBSQ HOSP IP/OBS HIGH 50: CPT

## 2023-11-07 RX ORDER — WARFARIN SODIUM 2.5 MG/1
7.5 TABLET ORAL ONCE
Refills: 0 | Status: COMPLETED | OUTPATIENT
Start: 2023-11-07 | End: 2023-11-07

## 2023-11-07 RX ORDER — HEPARIN SODIUM 5000 [USP'U]/ML
1100 INJECTION INTRAVENOUS; SUBCUTANEOUS
Qty: 25000 | Refills: 0 | Status: DISCONTINUED | OUTPATIENT
Start: 2023-11-07 | End: 2023-11-11

## 2023-11-07 RX ORDER — METOPROLOL TARTRATE 50 MG
25 TABLET ORAL DAILY
Refills: 0 | Status: DISCONTINUED | OUTPATIENT
Start: 2023-11-08 | End: 2023-11-21

## 2023-11-07 RX ORDER — DEXTROSE 50 % IN WATER 50 %
50 SYRINGE (ML) INTRAVENOUS ONCE
Refills: 0 | Status: COMPLETED | OUTPATIENT
Start: 2023-11-07 | End: 2023-11-07

## 2023-11-07 RX ORDER — HYDRALAZINE HCL 50 MG
25 TABLET ORAL
Refills: 0 | Status: DISCONTINUED | OUTPATIENT
Start: 2023-11-07 | End: 2023-11-08

## 2023-11-07 RX ORDER — METOPROLOL TARTRATE 50 MG
12.5 TABLET ORAL ONCE
Refills: 0 | Status: COMPLETED | OUTPATIENT
Start: 2023-11-07 | End: 2023-11-07

## 2023-11-07 RX ADMIN — CHLORHEXIDINE GLUCONATE 1 APPLICATION(S): 213 SOLUTION TOPICAL at 06:31

## 2023-11-07 RX ADMIN — Medication 81 MILLIGRAM(S): at 12:21

## 2023-11-07 RX ADMIN — ISOSORBIDE DINITRATE 20 MILLIGRAM(S): 5 TABLET ORAL at 17:14

## 2023-11-07 RX ADMIN — BUMETANIDE 2 MILLIGRAM(S): 0.25 INJECTION INTRAMUSCULAR; INTRAVENOUS at 06:30

## 2023-11-07 RX ADMIN — WARFARIN SODIUM 7.5 MILLIGRAM(S): 2.5 TABLET ORAL at 22:16

## 2023-11-07 RX ADMIN — ISOSORBIDE DINITRATE 20 MILLIGRAM(S): 5 TABLET ORAL at 06:31

## 2023-11-07 RX ADMIN — Medication 2: at 12:35

## 2023-11-07 RX ADMIN — PIPERACILLIN AND TAZOBACTAM 25 GRAM(S): 4; .5 INJECTION, POWDER, LYOPHILIZED, FOR SOLUTION INTRAVENOUS at 22:13

## 2023-11-07 RX ADMIN — TAMSULOSIN HYDROCHLORIDE 0.4 MILLIGRAM(S): 0.4 CAPSULE ORAL at 22:14

## 2023-11-07 RX ADMIN — Medication 1: at 17:12

## 2023-11-07 RX ADMIN — SENNA PLUS 2 TABLET(S): 8.6 TABLET ORAL at 22:14

## 2023-11-07 RX ADMIN — Medication 25 MILLIGRAM(S): at 17:14

## 2023-11-07 RX ADMIN — HEPARIN SODIUM 11 UNIT(S)/HR: 5000 INJECTION INTRAVENOUS; SUBCUTANEOUS at 14:40

## 2023-11-07 RX ADMIN — Medication 125 MICROGRAM(S): at 06:31

## 2023-11-07 RX ADMIN — HEPARIN SODIUM 12 UNIT(S)/HR: 5000 INJECTION INTRAVENOUS; SUBCUTANEOUS at 07:53

## 2023-11-07 RX ADMIN — PIPERACILLIN AND TAZOBACTAM 25 GRAM(S): 4; .5 INJECTION, POWDER, LYOPHILIZED, FOR SOLUTION INTRAVENOUS at 13:46

## 2023-11-07 RX ADMIN — ATORVASTATIN CALCIUM 80 MILLIGRAM(S): 80 TABLET, FILM COATED ORAL at 22:14

## 2023-11-07 RX ADMIN — Medication 12.5 MILLIGRAM(S): at 06:30

## 2023-11-07 RX ADMIN — ISOSORBIDE DINITRATE 20 MILLIGRAM(S): 5 TABLET ORAL at 12:21

## 2023-11-07 RX ADMIN — PANTOPRAZOLE SODIUM 40 MILLIGRAM(S): 20 TABLET, DELAYED RELEASE ORAL at 06:31

## 2023-11-07 RX ADMIN — Medication 50 MILLILITER(S): at 22:16

## 2023-11-07 RX ADMIN — Medication 12 UNIT(S): at 12:36

## 2023-11-07 RX ADMIN — PIPERACILLIN AND TAZOBACTAM 25 GRAM(S): 4; .5 INJECTION, POWDER, LYOPHILIZED, FOR SOLUTION INTRAVENOUS at 06:31

## 2023-11-07 RX ADMIN — HEPARIN SODIUM 10 UNIT(S)/HR: 5000 INJECTION INTRAVENOUS; SUBCUTANEOUS at 22:15

## 2023-11-07 RX ADMIN — Medication 50 MILLIGRAM(S): at 06:31

## 2023-11-07 RX ADMIN — Medication 12.5 MILLIGRAM(S): at 17:14

## 2023-11-07 RX ADMIN — Medication 12 UNIT(S): at 17:12

## 2023-11-07 NOTE — PROGRESS NOTE ADULT - PROBLEM SELECTOR PLAN 2
# Acute HFREF, complicated by cardiogenic shock, acute systolic HF, s/p CCU stay and inotropiic support, now weaned off milrinone 10/28 -11/1  TTE 10/16: EF 20%, severely reduced LVSF  RHC 10/19: RA 10, PCWP 20, CO 4.4, CI 2.3  cMR 10/18: limited viability in LAD territory  R groin swan placed 10/30: RA 14/13/12, RV 51/14, PA 47/34/39, PCW 34/36/32, CI 2.5/CO 4.95, removed 11/2    - trend lactate daily  - 11/7: HOLD bumex per HF recs. reasses on 11/9.   - GDMT: hold Entresto i/s/o hypotension, spironolactone due to LAURA  - ToprolXL 25mg po qd  - # Hypotension: Hydralazine reduce dose 25mg BID  - s/p dig load, dig level 1.4, acceptable per HF. Continue Digoxin   - Lipitor 80mg po hs   >     > Pending further recs from HF team. 11/7: Reached out to HF attending Dr. Ruiz, awaiting call back.   - f/u device interrogation  - avoid BB/CCB given borderline cardiac function  - strict Is/Os, daily weights # Acute HFREF, complicated by cardiogenic shock, acute systolic HF, s/p CCU stay and inotropiic support, now weaned off milrinone 10/28 -11/1  TTE 10/16: EF 20%, severely reduced LVSF  RHC 10/19: RA 10, PCWP 20, CO 4.4, CI 2.3  cMR 10/18: limited viability in LAD territory  R groin swan placed 10/30: RA 14/13/12, RV 51/14, PA 47/34/39, PCW 34/36/32, CI 2.5/CO 4.95, removed 11/2    - trend lactate daily  - 11/7: HOLD bumex per HF recs. reasses on 11/9.   - GDMT: hold Entresto i/s/o hypotension, spironolactone due to LAURA  - ToprolXL 25mg po qd  - # Hypotension: Hydralazine reduce dose 25mg BID  - s/p dig load, dig level 1.4, acceptable per HF. Continue Digoxin   - Lipitor 80mg po hs   >     > Pending further recs from HF team. 11/7: Reached out to HF attending Dr. Ruiz, case discussed 11/7, per their recs goal is to optimize patient on GDMT, at the moment not a candidate for lvad/PCI/CABG/transplant.   - avoid BB/CCB given borderline cardiac function  - strict Is/Os, daily weights

## 2023-11-07 NOTE — PROGRESS NOTE ADULT - SUBJECTIVE AND OBJECTIVE BOX
[facility] Division of Hospital Medicine  Marla Martin MD M-F, 8A-5P: MS Teams, Pager  Other Times: HIC Extension / HIC Pager      Patient is a 58y old  Male who presents with a chief complaint of NSTEMI (2023 08:54)      SUBJECTIVE / OVERNIGHT EVENTS:  Patient was examined this morning. He is sitting in bed and appears bit dyspneic but he denies any dyspnea. He denies any headache, dizziness, blurry vision, chest pain, palpitations, abdominal pain. Patient advised regarding fall precautions.     ADDITIONAL REVIEW OF SYSTEMS:    MEDICATIONS  (STANDING):  aspirin enteric coated 81 milliGRAM(s) Oral daily  atorvastatin 80 milliGRAM(s) Oral at bedtime  chlorhexidine 2% Cloths 1 Application(s) Topical <User Schedule>  digoxin     Tablet 125 MICROGram(s) Oral daily  heparin  Infusion 1100 Unit(s)/Hr (12 mL/Hr) IV Continuous <Continuous>  hydrALAZINE 25 milliGRAM(s) Oral two times a day  insulin glargine Injectable (LANTUS) 30 Unit(s) SubCutaneous at bedtime  insulin lispro (ADMELOG) corrective regimen sliding scale   SubCutaneous three times a day before meals  insulin lispro (ADMELOG) corrective regimen sliding scale   SubCutaneous at bedtime  insulin lispro Injectable (ADMELOG) 12 Unit(s) SubCutaneous three times a day before meals  isosorbide   dinitrate Tablet (ISORDIL) 20 milliGRAM(s) Oral three times a day  metoprolol tartrate 12.5 milliGRAM(s) Oral once  ondansetron Injectable 4 milliGRAM(s) IV Push once  pantoprazole    Tablet 40 milliGRAM(s) Oral before breakfast  piperacillin/tazobactam IVPB.. 3.375 Gram(s) IV Intermittent every 8 hours  senna 2 Tablet(s) Oral at bedtime  tamsulosin 0.4 milliGRAM(s) Oral at bedtime  warfarin 7.5 milliGRAM(s) Oral once    MEDICATIONS  (PRN):  benzocaine/menthol Lozenge 1 Lozenge Oral every 2 hours PRN Sore Throat  polyethylene glycol 3350 17 Gram(s) Oral daily PRN Constipation      CAPILLARY BLOOD GLUCOSE      POCT Blood Glucose.: 242 mg/dL (2023 12:31)  POCT Blood Glucose.: 79 mg/dL (2023 07:49)  POCT Blood Glucose.: 106 mg/dL (2023 21:30)  POCT Blood Glucose.: 118 mg/dL (2023 16:36)  POCT Blood Glucose.: 104 mg/dL (2023 12:58)      I&O's Summary    2023 07:01  -  2023 07:00  --------------------------------------------------------  IN: 920 mL / OUT: 750 mL / NET: 170 mL    2023 07:01  -  2023 12:48  --------------------------------------------------------  IN: 360 mL / OUT: 0 mL / NET: 360 mL        Daily     Daily Weight in k.7 (2023 09:08)    PHYSICAL EXAM:  Vital Signs Last 24 Hrs  T(C): 37.2 (2023 12:25), Max: 37.6 (2023 21:17)  T(F): 98.9 (2023 12:25), Max: 99.6 (2023 21:17)  HR: 110 (2023 12:25) (105 - 116)  BP: 102/66 (2023 12:25) (95/58 - 113/78)  BP(mean): --  RR: 18 (2023 12:25) (18 - 18)  SpO2: 97% (2023 12:25) (94% - 98%)    Parameters below as of 2023 12:  Patient On (Oxygen Delivery Method): nasal cannula  O2 Flow (L/min): 3    CONSTITUTIONAL: NAD  EYES: PERRLA; conjunctiva and sclera clear  ENMT: Moist oral mucosa  NECK: Supple, no palpable masses  RESPIRATORY: Faint bibasilar crackles  CARDIOVASCULAR: Regular rate and rhythm, normal S1 and S2, no murmur/rub/gallop; No lower extremity edema  ABDOMEN: Nontender to palpation, normoactive bowel sounds, no rebound/guarding  MUSCULOSKELETAL:  No clubbing or cyanosis of digits; no joint swelling or tenderness to palpation  PSYCH: A+O to person, place. Not oriented to situation. Follows commands  NEUROLOGY: CN 2-12 are intact and symmetric; no gross sensory deficits   SKIN: No rashes; no palpable lesions    LABS:                        9.4    6.64  )-----------( 211      ( 2023 06:21 )             30.9     11-07    137  |  101  |  47<H>  ----------------------------<  69<L>  4.2   |  21<L>  |  2.24<H>    Ca    8.6      2023 06:20  Phos  5.7     11-  Mg     2.2     11-06    TPro  5.8<L>  /  Alb  3.1<L>  /  TBili  0.4  /  DBili  x   /  AST  19  /  ALT  38  /  AlkPhos  92  11-07    LIVER FUNCTIONS - ( 2023 06:20 )  Alb: 3.1 g/dL / Pro: 5.8 g/dL / ALK PHOS: 92 U/L / ALT: 38 U/L / AST: 19 U/L / GGT: x           PT/INR - ( 2023 06:21 )   PT: 16.5 sec;   INR: 1.59 ratio         PTT - ( 2023 06:21 )  PTT:78.0 sec      Urinalysis Basic - ( 2023 06:20 )    Color: x / Appearance: x / SG: x / pH: x  Gluc: 69 mg/dL / Ketone: x  / Bili: x / Urobili: x   Blood: x / Protein: x / Nitrite: x   Leuk Esterase: x / RBC: x / WBC x   Sq Epi: x / Non Sq Epi: x / Bacteria: x        Culture - Sputum (collected 2023 19:10)  Source: .Sputum Sputum  Gram Stain (2023 02:27):    No polymorphonuclear leukocytes per low power field    Rare Squamous epithelial cells per low power field    Moderate Gram Negative Rods per oil power field      SARS-CoV-2: NotDetec (2023 19:17)  SARS-CoV-2: NotDetec (2023 18:25)  SARS-CoV-2: NotDetec (24 Oct 2023 07:17)  SARS-CoV-2: NotDetec (20 Oct 2023 09:18)  COVID-19 PCR: NotDetec (14 Oct 2023 11:34)      RADIOLOGY & ADDITIONAL TESTS:  Results Reviewed:   Imaging Personally Reviewed:  Electrocardiogram Personally Reviewed:    COORDINATION OF CARE:  Care Discussed with Consultants/Other Providers [Y/N]:  Prior or Outpatient Records Reviewed [Y/N]:

## 2023-11-07 NOTE — RAPID RESPONSE TEAM SUMMARY - NSSITUATIONBACKGROUNDRRT_GEN_ALL_CORE
58M with PMH of CVA with mild left sided deficits, HTN, DM2, vertigo, HLD, Mobitz II s/p Medtronic dual chamber ICD (12/21/22) initially presented to OSH for SOB c/f ADHF vs. PNA, later found to have NSTEMI transferred to [facility] for LHC evaluation. 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 Admitted to CICU for hypotension, inotropic support, hemodynamic monitoring. Previously downgraded on 10/22, RRT called while on the floor for AMS and hypotensive to 80/40, CTH 10/27 found possible R cerebellar infarct, s/p re-transfer to CICU for inotropic support. Pt now stabilized and transferred back to medicine.

## 2023-11-07 NOTE — PROGRESS NOTE ADULT - SUBJECTIVE AND OBJECTIVE BOX
[facility] DIVISION OF PULMONARY, CRITICAL CARE and SLEEP MEDICINE  PULMONARY PROGRESS NOTE  OFFICE NUMBER: [phone]    PATIENT INFORMATION:  NAME: BRANDEN MARRUFO:  MRN: [id number]    CHIEF COMPLAINT: Patient is a 58y old  Male who presents with a chief complaint of NSTEMI (07 Nov 2023 12:48)      [x] INITIAL CONSULT, H&P, FAMILY HISTORY and PAST MEDICAL AND SURGICAL HISTORY REVIEWED    OVERNIGHT EVENTS or CHANGES TO HPI:   - No complaints this afternoon  - Patient is more alert than yesterday and interactive  - He is laying nearly flat (~15-20 degrees) without dyspnea or tachypnea  - He remains on 3L NC with O2 sats > 96% - will lower to 2L (discussed with nurse at bedside)  - Has been taken off diuretics per HF team    ========================REVIEW OF SYSTEMS========================  CONSTITUTIONAL: more alert today - no complaints  CARDIOVASCULAR: denies chest pain or palpitations   PULMONARY: no pulmonary complaints, no cough or sputum production  [x] REMAINING REVIEW OF SYSTEMS NEGATIVE  [] UNABLE TO OBTAIN REVIEW OF SYSTEMS DUE TO _______________    ========================MEDICATIONS=============================  MEDICATIONS  (STANDING):  aspirin enteric coated 81 milliGRAM(s) Oral daily  atorvastatin 80 milliGRAM(s) Oral at bedtime  chlorhexidine 2% Cloths 1 Application(s) Topical <User Schedule>  digoxin     Tablet 125 MICROGram(s) Oral daily  heparin  Infusion 1100 Unit(s)/Hr (11 mL/Hr) IV Continuous <Continuous>  hydrALAZINE 25 milliGRAM(s) Oral two times a day  insulin glargine Injectable (LANTUS) 30 Unit(s) SubCutaneous at bedtime  insulin lispro (ADMELOG) corrective regimen sliding scale   SubCutaneous three times a day before meals  insulin lispro (ADMELOG) corrective regimen sliding scale   SubCutaneous at bedtime  insulin lispro Injectable (ADMELOG) 12 Unit(s) SubCutaneous three times a day before meals  isosorbide   dinitrate Tablet (ISORDIL) 20 milliGRAM(s) Oral three times a day  metoprolol tartrate 12.5 milliGRAM(s) Oral once  ondansetron Injectable 4 milliGRAM(s) IV Push once  pantoprazole    Tablet 40 milliGRAM(s) Oral before breakfast  piperacillin/tazobactam IVPB.. 3.375 Gram(s) IV Intermittent every 8 hours  senna 2 Tablet(s) Oral at bedtime  tamsulosin 0.4 milliGRAM(s) Oral at bedtime  warfarin 7.5 milliGRAM(s) Oral once      MEDICATIONS  (PRN):  benzocaine/menthol Lozenge 1 Lozenge Oral every 2 hours PRN Sore Throat  polyethylene glycol 3350 17 Gram(s) Oral daily PRN Constipation      ========================PHYSICAL EXAM============================    VITALS: ICU Vital Signs Last 24 Hrs  T(C): 37.2 (07 Nov 2023 12:25), Max: 37.6 (06 Nov 2023 21:17)  T(F): 98.9 (07 Nov 2023 12:25), Max: 99.6 (06 Nov 2023 21:17)  HR: 111 (07 Nov 2023 15:41) (105 - 116)  BP: 105/73 (07 Nov 2023 15:41) (97/65 - 113/78)  RR: 18 (07 Nov 2023 15:41) (18 - 18)  SpO2: 98% (07 Nov 2023 15:41) (94% - 98%)    O2 Parameters below as of 07 Nov 2023 15:41  Patient On (Oxygen Delivery Method): nasal cannula  O2 Flow (L/min): 2          INTAKE and OUTPUT: I&O's Summary    06 Nov 2023 07:01  -  07 Nov 2023 07:00  --------------------------------------------------------  IN: 920 mL / OUT: 750 mL / NET: 170 mL    07 Nov 2023 07:01  -  07 Nov 2023 15:48  --------------------------------------------------------  IN: 360 mL / OUT: 0 mL / NET: 360 mL      VENTILATOR SETTINGS: N/A    GENERAL: awake, alert, appropriately interactive, more alert today  EYES: anicteric, EOMI  EAR/NOSE/MOUTH/THROAT: NCAT, MMM, nares clear, no thrush  NECK: supple, HJR  CARDIOVASCULAR: RRR, S1S2, systolic murmur  RESPIRATORY: decreased BS at bases, no accessory muscle use, no wheeze  ABDOMEN: soft, distended, +BS  EXTREMITIES: no LE edema, no clubbing or cyanosis   SKIN: warm and dry  MUSCULOSKELETAL: strength intact  NEUROLOGIC: nonfocal exam  PSYCHIATRIC: calm, more alert today     ========================LABORATORY RESULTS AND IMAGING=============                        9.4    6.64  )-----------( 211      ( 07 Nov 2023 06:21 )             30.9                                                    11-07    137  |  101  |  47<H>  ----------------------------<  69<L>  4.2   |  21<L>  |  2.24<H>    Ca    8.6      07 Nov 2023 06:20  Phos  5.7     11-06  Mg     2.2     11-06    TPro  5.8<L>  /  Alb  3.1<L>  /  TBili  0.4  /  DBili  x   /  AST  19  /  ALT  38  /  AlkPhos  92  11-07          Creatinine Trend: 2.24<--, 2.16<--, 2.26<--, 2.07<--, 2.00<--, 2.05<--    CT CHEST:     [] RADIOLOGY REVIEWED AND INTERPRETED BY ME      THANK YOU FOR ALLOWING US TO PARTICIPATE IN THE CARE OF THIS PATIENT

## 2023-11-07 NOTE — PROGRESS NOTE ADULT - PROBLEM SELECTOR PLAN 11
On hep gtt as above
DVT PPX: AC as above
On hep gtt to coum as above
DVT PPX: AC as above    - Last BM reported 11/6.

## 2023-11-07 NOTE — PROGRESS NOTE ADULT - ASSESSMENT
59 yo Male with prior Stroke with mild ?left sided deficits (improved), HTN, DM2, vertigo, HLD, Mobitz II s/pp Medtronic dual chamber ICD (12/21/22) initially presented to [facility] from home with complaints of dyspnea and chest pain and was admitted w/ acute hypoxic respiratory failure likely due to acute pulmonary edema due to acute HFrEF in setting of acute NSTEMI, LAURA and enterovirus infection. Pt with brief MICU stay at [facility] requiring bipap (no intubation), was treated for PNA with cefepime, and was found to have TTE done 9/26/23 showing EF 20% with severely decreased LVSF, multiple regional wall motion abnormalities, and elevated LV end diastolic pressure. Pt was transferred to [facility] for cardiac eval.   TTE EF < 20%  RHC 10/19: RA 10, RV 47/9/13, Wedge 29, PA 41/26/33, CO/CI 4.4/2.3, PA sat 57.3  EKG: sinus tachycardia, septal q-waves, left axis deviation   TTE 10/16/23: LV 5.5 cm, LVEF 20% with regional WMAs worse in the apex, LVOT VTI 8 cm, normal RV size/function, severe functional MR, small pericardial effusion, estimated RA pressure 8 mmHg   TTE 12/2022: normal LVEF   LHC: 95% discrete proximal LAD disease and sequential multifocal disease with good distal target, 80-90% proximal LCx disease just prior to marginals, severe multifocal RCA disease with good targets   A1c 8.4    CD 10/17 neg   NIHSS 1  CTH with age indeterminate R cerebellar infarct.  likely chronic   MRi brain with eovlving acute/subacute R PICA infarct with mild edema and mild hemorrhagic transformation. 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  occluded R VA.   CTH stable   RRT/code stroke on 11/6 in setting of SBP 80s/50s   CTH stable. old R cerebellar infarct. CTA with severe L VA stenosis ; R VA occlusion     Impression:   R cerebellar/PICA infarct as well as L parietal embolic infarct, likely cardioembolic   worsening symptoms in setting of hypotension,m now improved       - keep SBP > 100  - c/o SOB ; f/u cardio and pulmo   - f/u heart failure team   - can consider routine EEG but low yield   - CTS eval for CABG given 3 vessel disease vs high risk PCI --> not a CABG candidate   - c/w asa and statin therpay for secondary stroke prevention.   - no objection to heparin drip for low EF and LV thrombus  --> no objection to transition to coumadin ; hep to coumadin bridge ; coumadin per INR   - called for risk stratification for heart transplant eval,  low-mod risk from stroke standpoint and no objection   - b12, TSH, RPR for reversible causes of dementia   - telemetry  - PT/OT   - check FS, glucose control <180  - GI/DVT ppx   - Thank you for allowing me to participate in the care of this patient. Call with questions.   spoke with primary team     Abelardo Irving MD  Vascular Neurology  Office: [phone]

## 2023-11-07 NOTE — RAPID RESPONSE TEAM SUMMARY - NSADDTLFINDINGSRRT_GEN_ALL_CORE
RRT called to VT on tele. Pt asymptomatic throughout episode. Per primary team, pt has been intermittently in VT and back to his usual rhythm. Pt had been on digoxin, metoprolol, hydralazine and isordil for cardiac medications. T 96.8, /86, , satting 96% on NC. Reviewed am labs, K>4 and no Mg available, previous Mg 2.2. Labs including CBC, CMP, VBG, coags, and cardiac enzymes obtained.

## 2023-11-07 NOTE — PROGRESS NOTE ADULT - SUBJECTIVE AND OBJECTIVE BOX
Neurology        S: patient seen and examined. MRI confirms embolic infarcts ; code stroke/RRT yesterday, improved             Medications: MEDICATIONS  (STANDING):  aspirin enteric coated 81 milliGRAM(s) Oral daily  atorvastatin 80 milliGRAM(s) Oral at bedtime  chlorhexidine 2% Cloths 1 Application(s) Topical <User Schedule>  digoxin     Tablet 125 MICROGram(s) Oral daily  heparin  Infusion 1100 Unit(s)/Hr (12 mL/Hr) IV Continuous <Continuous>  hydrALAZINE 25 milliGRAM(s) Oral two times a day  insulin glargine Injectable (LANTUS) 30 Unit(s) SubCutaneous at bedtime  insulin lispro (ADMELOG) corrective regimen sliding scale   SubCutaneous three times a day before meals  insulin lispro (ADMELOG) corrective regimen sliding scale   SubCutaneous at bedtime  insulin lispro Injectable (ADMELOG) 12 Unit(s) SubCutaneous three times a day before meals  isosorbide   dinitrate Tablet (ISORDIL) 20 milliGRAM(s) Oral three times a day  metoprolol tartrate 12.5 milliGRAM(s) Oral once  ondansetron Injectable 4 milliGRAM(s) IV Push once  pantoprazole    Tablet 40 milliGRAM(s) Oral before breakfast  piperacillin/tazobactam IVPB.. 3.375 Gram(s) IV Intermittent every 8 hours  senna 2 Tablet(s) Oral at bedtime  tamsulosin 0.4 milliGRAM(s) Oral at bedtime  warfarin 7.5 milliGRAM(s) Oral once    MEDICATIONS  (PRN):  benzocaine/menthol Lozenge 1 Lozenge Oral every 2 hours PRN Sore Throat  polyethylene glycol 3350 17 Gram(s) Oral daily PRN Constipation       Vitals:  Vital Signs Last 24 Hrs  T(C): 36.6 (07 Nov 2023 04:37), Max: 37.6 (06 Nov 2023 21:17)  T(F): 97.8 (07 Nov 2023 04:37), Max: 99.6 (06 Nov 2023 21:17)  HR: 113 (07 Nov 2023 06:30) (105 - 116)  BP: 113/78 (07 Nov 2023 06:30) (95/58 - 113/78)  BP(mean): --  RR: 18 (07 Nov 2023 06:30) (18 - 18)  SpO2: 96% (07 Nov 2023 06:30) (94% - 99%)    Parameters below as of 07 Nov 2023 06:30  Patient On (Oxygen Delivery Method): nasal cannula  O2 Flow (L/min): 3        General Exam:   General Appearance: Appropriately dressed and in no acute distress       Head: Normocephalic, atraumatic and no dysmorphic features  Ear, Nose, and Throat: Moist mucous membranes  CVS: S1S2+  Resp: No SOB, no wheeze or rhonchi  GI: soft NT/ND  Extremities: No edema or cyanosis  Skin: No bruises or rashes     Neurological Exam:  Mental Status: Awake, alert and oriented x 2.  Able to follow simple verbal commands. Able to name and repeat. fluent speech. No obvious aphasia or dysarthria noted. poor insight. not understanding why he is in hospital   Cranial Nerves: PERRL, EOMI, VFFC, sensation V1-V3 intact,  no obvious facial asymmetry, equal elevation of palate, scm/trap 5/5, tongue is midline on protrusion. no obvious papilledema on fundoscopic exam. hearing is grossly intact.   Motor: Normal bulk, tone and strength throughout. Fine finger movements were intact and symmetric. no tremors or drift noted.    Sensation: Intact to light touch and pinprick throughout. no right/left confusion. no extinction to tactile on DSS. Romberg was negative.   Reflexes: 1+ throughout at biceps, brachioradialis, triceps, patellars and ankles bilaterally and equal. No clonus. R toe and L toe were both downgoing.  Coordination: No dysmetria on FNF or HKS  Gait:   no limitations in gait.     Data/Labs/Imaging which I personally reviewed.       LABS:                          9.4    6.64  )-----------( 211      ( 07 Nov 2023 06:21 )             30.9     11-07    137  |  101  |  47<H>  ----------------------------<  69<L>  4.2   |  21<L>  |  2.24<H>    Ca    8.6      07 Nov 2023 06:20  Phos  5.7     11-06  Mg     2.2     11-06    TPro  5.8<L>  /  Alb  3.1<L>  /  TBili  0.4  /  DBili  x   /  AST  19  /  ALT  38  /  AlkPhos  92  11-07    LIVER FUNCTIONS - ( 07 Nov 2023 06:20 )  Alb: 3.1 g/dL / Pro: 5.8 g/dL / ALK PHOS: 92 U/L / ALT: 38 U/L / AST: 19 U/L / GGT: x           PT/INR - ( 07 Nov 2023 06:21 )   PT: 16.5 sec;   INR: 1.59 ratio         PTT - ( 07 Nov 2023 06:21 )  PTT:78.0 sec  Urinalysis Basic - ( 07 Nov 2023 06:20 )    Color: x / Appearance: x / SG: x / pH: x  Gluc: 69 mg/dL / Ketone: x  / Bili: x / Urobili: x   Blood: x / Protein: x / Nitrite: x   Leuk Esterase: x / RBC: x / WBC x   Sq Epi: x / Non Sq Epi: x / Bacteria: x           < from: CT Head No Cont (10.27.23 @ 18:04) >    ACC: [id number] EXAM:  CT BRAIN   ORDERED BY: BRTET HOPSON     PROCEDURE DATE:  10/27/2023          INTERPRETATION:  Clinical Indication: CVA    5mm axial sections of the brain were obtained from base to vertex,   without the intravenous administration of contrast material. Coronal and   sagittal computer generated reconstructed views are available.    No prior brain imaging is available for comparison.          The ventricles and sulci are prominent consistent with mild atrophy.   Small vesselwhite matter ischemic changes are noted. There is a infarct   in the right medial cerebellum of undetermined age which could be acute   to subacute based on its degree of lucency. There is no significant   hemorrhage. Bone window examination is unremarkable. There is been   previous right-sided lens replacement surgery.      IMPRESSION: Atrophy and small vessel white matter ischemic changes. Right   medial cerebellar infarct may be acute versus subacute. No hemorrhage.      --- End of Report ---    < from: MR Angio Head No Cont (10.30.23 @ 20:58) >    ACC: [id number] EXAM:  MR ANGIO BRAIN   ORDERED BY: NATALIE CARRANZA     ACC: [id number] EXAM:  MR ANGIO NECK   ORDERED BY: NATALIE CARRANZA     ACC: [id number] EXAM:  MR BRAIN   ORDERED BY: NAVNEET CORONADO     PROCEDURE DATE:  10/30/2023          INTERPRETATION:  .    CLINICAL INFORMATION: Stroke.    TECHNIQUE: Multiplanar multi sequential MRI examination of the brain was   performed without the administration of IV gadolinium. MRA images through   the neck and [place] of [place] were obtained usinga combination of 2-D   and 3-D time-of-flight acquisition. The data was then reformatted into a   volumetric data set and reviewed as rotational MIP images.    COMPARISON: Most recent prior CT examination of the head from 10/27/2023.   No prior MRI studies are available for comparison.    FINDINGS:    MRI Brain: A wedge-shaped area of diffusion restriction is seen within   the right PICA territory. Associated T2/FLAIR hyperintense signal is also   seen, compatible with cytotoxic edema. Mild hemorrhagic transformation is   seen within the infarct bed manifested by high signal on T1-weighted   imaging as well as susceptibility artifact on the SWI sequence. Mild mass   effect is seen without effacement of the fourth ventricle or shift of the   posterior fossa midline structures.    This is superimposed upon encephalomalacia and gliosis involving the   right cerebellar hemisphere.    An additional vague area of diffusion reduction is seen within the left   parietal periventricular white matter without associated T2/FLAIR   hyperintense signal, compatible with cytotoxic edema. No gross   hemorrhagic transformation is noted in this location.    Scattered foci of susceptibility artifact are seen within the bilateral   basal ganglia, compatible with areas of chronic microhemorrhage.    Multiple additional patchy confluent nonspecific T2/FLAIR hyperintense   signal changes are noted throughout the bihemispheric white matter   without associated mass effect or restricted diffusion.    Ventricular size and configuration is unremarkable. No abnormal   extra-axial fluid collections are seen. Flow-voids are noted throughout   the major intracranial vessels, on the T2 weighted images, consistent   with their patency. The sellar location appears unremarkable. There is an   unchanged appearing small retrocerebellar arachnoid cyst versus cisterna   magna.    A polyp versus retention cyst is seen within the right maxillary sinus.   Additional minor scattered mucosal thickening seen throughout the ethmoid   labyrinth. The tympanomastoid cavities are clear. The left orbit appears   within normal limits. There is evidence of right-sided cataract removal.    There is an indeterminate mixed signal ovoid shaped soft tissue focus   involving the left superior pinna measuring 1.4 x 0.9 cm which appears   unchanged.    MRA Neck: Examination is motion limited.    There is a standard anatomic variation to the aortic arch. The origins of   the great vessels appear grossly unremarkable.    The bilateral common carotid arteries, carotid bifurcations and cervical   internal carotid arteries appear patent. The origin of the left vertebral   artery is normal. The left vertebral artery is patent.    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    MRA [place] of [place]: Atherosclerosis affects the bilateral carotid   siphons with mild area of focal stenosis involving the right   clinoid/supraclinoid junction. There is mild hypoplasia of the right A1   segment. Otherwise, the bilateral intracranial internal carotid,   anterior, and middle cerebral arteries appear unremarkable.    The anterior and bilateral posterior communicating arteries are seen.    The right V4 segment does not demonstrate flow relatedsignal. The left   V4 segment appears unremarkable. The vertebrobasilar confluence appears   unremarkable. Long segment mild stenosis involving the mid basilar   artery. The basilar tip appears unremarkable as well as the bilateral   posterior cerebral arteries.    IMPRESSION:    MRI BRAIN:  1. Evolving acute/subacute right-sided PICA distribution infarction with   associated cytotoxic edema and very mild hemorrhagic transformation.  2. Additional wedge-shaped area of acute/subacute ischemia within the   left parietal periatrial white matter with associated cytotoxic edema.  3. Indeterminate soft tissue lesion involving the left superior pinna   measuring 1.4 x 0.9 cm. Neoplasm cannot be excluded. Recommend   correlation with direct physical examination and visualization.    MRA NECK:  1. Extremely motion limited study.  2. 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 Recommend further evaluation with a CT angiogram study   of the neck.    MRA HEAD:  1. 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 This can be further evaluated with a CT angiogram study of the   head.  2. Mild focal stenosis of the right supraclinoid intracranial internal   carotid artery secondary to atherosclerosis.  3. Otherwise, no large vessel occlusion or major stenosis.    --- End of Report ---      < from: CT Brain Stroke Protocol (11.06.23 @ 14:11) >    ACC: [id number] EXAM:  CT BRAIN STROKE PROTOCOL   ORDERED BY:  KAMILA TAVAREZ     ACC: [id number] EXAM:  CT ANGIO NECK STROKE PROTCL IC   ORDERED BY:  KAMILA TAVAREZ     ACC: [id number] EXAM:  CT ANGIO BRAIN STROKE PROTC IC   ORDERED BY:  KAMILA TAVAREZ     PROCEDURE DATE:  11/06/2023          INTERPRETATION:  Clinical Indication: Code stroke for dizziness, prior   infarct one week ago    5mm axial sections of the brain were obtained from base to vertex,   without the intravenous administration of contrast material. Coronal and   sagittal computer generated reconstructed views are available.    Comparison is made with the prior CT of 10/27/2023 and the MRI 10/30/2023.    There is moderate atrophy for the patient's age with ventricular and   sulcal prominence. Small vessel white matter ischemic changes are noted.   There is an old right medial occipital infarct. No acute hemorrhage is   identified.        After the intravenous power injection of 70 cc of Omnipaque 300 using a   bolus amol timing run serial thin sections were obtained through the   neck from the thoracic inlet through the intracranial circulation   centered at the [id number] on a multislice CT scanner reformatted   with coronal and sagittal 2 D-MIP projections, including 3 D  reconstructions using a separate 3D [employer] software workstation.A total   of  70 cc of Omnipaque were intravenously injected.  30 cc were discarded.    The origins of the common carotid arteries are normal. The origin of the   left dominant left vertebral artery demonstrates moderate stenosis   nondominant small right vertebral artery appears hypoplastic on a   congenital basis and is occluded at the V3 and V4 portion.    There is severe stenosis of the V4 segment of the left vertebral artery   just proximal to the VV junction. The basilar artery is normal. The   posterior cerebral and superior cerebellar arteries are normal.    Evaluation of the carotid arteries demonstrate normal appearance to   distal cervical, petrous, cavernous and supraclinoid internal carotid   arteries. The anterior cerebral arteries and anterior communicating   artery are visualized, the right A1 segment is hypoplastic on a   congenital basis. The middle cerebral arteries are visualized, the left   M1 segment is moderately narrowed but patent. The middle cerebral artery   vessels in the sylvian fissure unremarkable.      The normal intracranial venous circulation is identified. The right   transverse sinus is dominant. The superior sagittal sinus, internal   cerebral veins, vein of Jeremías, straight sinus, transverse sinuses,   sigmoid sinuses and internal jugular veins are normal.  Cortical veins are normal.      There are large bilateral pleural effusions and consolidation in the left   upper lobe whichis similar to a prior exam of 11/5/2023. There is a   left-sided permanent pacemaker.    IMPRESSION: Atrophy for the patient's age. Old right cerebellar infarct.   No hemorrhage. No change since 10/30/2023.    Severe stenosis of the dominant left vertebral artery at the V4 segment   and moderate stenosis at the origin. 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 Moderate left M1 stenosis.    --- End of Report ---            ANDREW TONEY MD; Attending Radiologist  This document has been electronically signed. Nov 6 2023  2:35PM    < end of copied text >        LAKESHA DIAZ MD; Attending Radiologist  This document has been electronically signed. Oct 30 2023  9:20PM    < end of copied text >

## 2023-11-07 NOTE — PROGRESS NOTE ADULT - ASSESSMENT
58M with PMH of CVA with mild left sided deficits, HTN, DM2, vertigo, HLD, Mobitz II s/p [employer] dual chamber ICD (12/21/22) initially presented to OSH for SOB c/f ADHF vs. PNA, later found to have NSTEMI transferred to [facility] for LHC evaluation. 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 Admitted to CICU for hypotension, inotropic support, hemodynamic monitoring. Previously downgraded on 10/22, RRT called while on the floor for AMS and hypotensive to 80/40, CTH 10/27 found possible R cerebellar infarct, s/p re-transfer to CICU for inotropic support. Pt now stabilized and transferred back to medicine.

## 2023-11-07 NOTE — PROGRESS NOTE ADULT - PROBLEM SELECTOR PROBLEM 10
BPH without obstruction/lower urinary tract symptoms
BPH without obstruction/lower urinary tract symptoms
Prophylactic measure
BPH without obstruction/lower urinary tract symptoms
BPH without obstruction/lower urinary tract symptoms
Prophylactic measure

## 2023-11-07 NOTE — PROGRESS NOTE ADULT - PROBLEM SELECTOR PLAN 1
# Hypoxemia likely 2/2 cardiogenic pulm edema, PNA  - Infiltrates on CXR.   - CT chest: Interval increase in bilateral peribronchovascular groundglass opacities and small consolidations suggesting of multifocal pneumonia. Increased bilateral pleural effusions.  - Afebrile, no leukocytosis. Check procal, RVP, sputum cx, blood cx, urine legionella, strep pen ags, nasal MRSA, lactate, abg  - Consulted pulmonology team 11/6, appreciate recs > continue diureses as able. Started Zosyn abx x48hrs pending results.   - continue supportive care, incentive spirometry. strict aspiration precautions. oral hygiene.   - wean off O2 as tolerated  # 6 mm pulmonary nodule on imaging: follow-up on repeat imaging in 3-6 months

## 2023-11-07 NOTE — PROGRESS NOTE ADULT - ASSESSMENT
58M DM2, HTN, HLD, prior CVA (2012) with mild L sided weakness presenting as a transfer from [facility] for NSTEMI on 10/13/23. His hospital course has been complicated by acute systolic dysfunction, LV thrombus, and possible acute stroke.  - He has had a LHC with triple vessel disease and a RHC with elevated filling pressures    #Acute Hypoxemic Respiratory Failure  - Continue supplemental O2 to maintain O2 sat > 90% - taper down FiO2 as able. Lowered to 2L at bedside today and nurse informed  - Incentive spirometer and acapella to facilitate breathing - patient will be able to cooperate with this today  - Hypoxemia most likely related to his cardiovascular disease    #Pleural Effusion  - Continued attempts at volume removal with close monitoring of I/O and daily weights.   - From a pulmonary standpoint patient requires continued attempts at volume removal/diuresis  - No role for thoracentesis at this time as patient is breathing comfortably and has bilateral effusions in the setting of known cardiovascular disease  - He would have to be off AC if thoracentesis is needed in the future - currently on AC for LV thrombus with Heparin drip and Warfarin    #6 mm pulmonary nodule  - Attention to follow-up on repeat imaging in 3-6 months  - Patient does not have any history of smoking, fevers/night sweats, or reported B-symptoms    #Consolidations  - Patient reportedly treated for pneumonia at [facility] - though unclear duration of antibiotics  - Follow-up results of cultures sent yesterday - procalcitonin not elevated but sputum culture with GNR - await speciation of this  - Though I doubt infection it is reasonable to give antibiotics for 48 hours pending culture results - if all negative would then D/C antibiotics    #Lethargy - improved today  - Would consider ABG to evaluate for hypercapnia and oxygenation if mental status worsens  - Neurology follow-up    #Cardiovascular disease  - Continue management as per cardiology - currently with diuresis being held as he does not have peripheral edema  - From a pulmonary standpoint he needs continued diuresis - his most recent RHC from 10/30 notes continued elevation in PCWP  - Continue AC for LV thrombus as per Cardiology  - Patient not a candidate for CABG - has triple vessel disease - remains on ASA and statin    #Prophylaxis  - DVT proph  - GI proph  - Aspiration precautions - this will be particularly important given his lethargy/forgetfulness at times  - Incentive spirometer and acapella    Patient's prognosis is guarded.

## 2023-11-07 NOTE — RAPID RESPONSE TEAM SUMMARY - NSOTHERINTERVENTIONSRRT_GEN_ALL_CORE
RRT called for VT with pulse, pt asymptomatic and vital signs stable. Discussed with primary team to follow up electrolyte abnormalities and to replete for goal K>4 and Mg>2. Pt no longer in VT by end of rapid, converted without medications. Primary team to follow up on cardiology recs.
Routine labs drawn.

## 2023-11-07 NOTE — PROGRESS NOTE ADULT - PROBLEM SELECTOR PLAN 10
Heparin sq for DVT prophylaxis.
c/w Flomax 0.4mg qhs
c/w Flomax 0.4mg qhs
On hep gtt as above
c/w Flomax 0.4mg qhs
c/w Flomax 0.4mg qhs

## 2023-11-07 NOTE — CHART NOTE - NSCHARTNOTEFT_GEN_A_CORE
58 year old male w/hx of CVA, DM2, found to have EF <20% had episode of 12 beats of vtach. Unknown if this has happened in the past however pt has extensive cardiac history. Pt has no complaints at this time. Denies chest pain, no dizziness, no N/V, no palpitations. Was just lying in bed when this occurred. PT PTT found to be 118 so pt specific was decreased by 1. Pt then found to have FS of 48; 25g dextrose (D50): 50cc given STAT and will follow 58 year old male w/hx of CVA, DM2, found to have EF <20% had episode of 12 beats of vtach, followed by several additional episodes of Vtach. Unknown if this has happened in the past however pt has extensive cardiac history. Pt has no complaints at this time, and is asymptomatic throughout episodes. Denies chest pain, no dizziness, no N/V, no palpitations. Does complain of feeling cold. Pt was trying to fall asleep when this occurred. Pt is also on pt specific heparin. PTT found to be 118 so pt specific was decreased by 1. Pt then found to have FS of 48; 25g dextrose (D50): 50cc given. Pt continued to have episodes of Vtach and rapid response was called. Pt terminated Vtach without intervention.     Vital Signs Last 24 Hrs  T(C): 36.6 (07 Nov 2023 21:03), Max: 37.2 (07 Nov 2023 12:25)  T(F): 97.9 (07 Nov 2023 21:03), Max: 98.9 (07 Nov 2023 12:25)  HR: 100 (07 Nov 2023 21:03) (100 - 113)  BP: 103/68 (07 Nov 2023 21:03) (102/66 - 113/78)  BP(mean): --  RR: 18 (07 Nov 2023 21:03) (18 - 18)  SpO2: 99% (07 Nov 2023 21:03) (94% - 99%) on NC 2 l/min       GENERAL: NAD although pt appears sleepy  HEAD:  Atraumatic, Normocephalic  EYES: EOMI, PERRLA, conjunctiva and sclera clear  ENMT:  dry lips, pale   NECK: Supple, No JVD   NERVOUS SYSTEM:  Alert & Oriented X 3,moves all ext.  CHEST/LUNG: CTA bilaterally; No rales, rhonchi, wheezing   HEART: Rapid   ABDOMEN: Soft, Nontender, Nondistended; Bowel sounds present  EXTREMITIES:  2+ Peripheral Pulses, No cyanosis, or edema  SKIN: warm    A/P Runs of Vtach  Rapid Response Called  As per cards fellow will STOP digoxin  Labs to be followed  Pt to be transferred to CCU   Attempted to notify family, no response

## 2023-11-08 DIAGNOSIS — Z86.79 PERSONAL HISTORY OF OTHER DISEASES OF THE CIRCULATORY SYSTEM: ICD-10-CM

## 2023-11-08 LAB
ALBUMIN SERPL ELPH-MCNC: 3.4 G/DL — SIGNIFICANT CHANGE UP (ref 3.3–5)
ALBUMIN SERPL ELPH-MCNC: 3.4 G/DL — SIGNIFICANT CHANGE UP (ref 3.3–5)
ALBUMIN SERPL ELPH-MCNC: 3.6 G/DL — SIGNIFICANT CHANGE UP (ref 3.3–5)
ALBUMIN SERPL ELPH-MCNC: 3.6 G/DL — SIGNIFICANT CHANGE UP (ref 3.3–5)
ALP SERPL-CCNC: 101 U/L — SIGNIFICANT CHANGE UP (ref 40–120)
ALP SERPL-CCNC: 101 U/L — SIGNIFICANT CHANGE UP (ref 40–120)
ALP SERPL-CCNC: 114 U/L — SIGNIFICANT CHANGE UP (ref 40–120)
ALP SERPL-CCNC: 114 U/L — SIGNIFICANT CHANGE UP (ref 40–120)
ALT FLD-CCNC: 40 U/L — SIGNIFICANT CHANGE UP (ref 10–45)
ALT FLD-CCNC: 40 U/L — SIGNIFICANT CHANGE UP (ref 10–45)
ALT FLD-CCNC: 49 U/L — HIGH (ref 10–45)
ALT FLD-CCNC: 49 U/L — HIGH (ref 10–45)
ANION GAP SERPL CALC-SCNC: 13 MMOL/L — SIGNIFICANT CHANGE UP (ref 5–17)
ANION GAP SERPL CALC-SCNC: 13 MMOL/L — SIGNIFICANT CHANGE UP (ref 5–17)
ANION GAP SERPL CALC-SCNC: 16 MMOL/L — SIGNIFICANT CHANGE UP (ref 5–17)
ANION GAP SERPL CALC-SCNC: 16 MMOL/L — SIGNIFICANT CHANGE UP (ref 5–17)
APTT BLD: 129.6 SEC — CRITICAL HIGH (ref 24.5–35.6)
APTT BLD: 129.6 SEC — CRITICAL HIGH (ref 24.5–35.6)
APTT BLD: 62.3 SEC — HIGH (ref 24.5–35.6)
APTT BLD: 62.3 SEC — HIGH (ref 24.5–35.6)
APTT BLD: 64.2 SEC — HIGH (ref 24.5–35.6)
APTT BLD: 64.2 SEC — HIGH (ref 24.5–35.6)
APTT BLD: >200 SEC — CRITICAL HIGH (ref 24.5–35.6)
APTT BLD: >200 SEC — CRITICAL HIGH (ref 24.5–35.6)
AST SERPL-CCNC: 21 U/L — SIGNIFICANT CHANGE UP (ref 10–40)
AST SERPL-CCNC: 21 U/L — SIGNIFICANT CHANGE UP (ref 10–40)
AST SERPL-CCNC: 39 U/L — SIGNIFICANT CHANGE UP (ref 10–40)
AST SERPL-CCNC: 39 U/L — SIGNIFICANT CHANGE UP (ref 10–40)
BASOPHILS # BLD AUTO: 0.01 K/UL — SIGNIFICANT CHANGE UP (ref 0–0.2)
BASOPHILS # BLD AUTO: 0.01 K/UL — SIGNIFICANT CHANGE UP (ref 0–0.2)
BASOPHILS NFR BLD AUTO: 0.1 % — SIGNIFICANT CHANGE UP (ref 0–2)
BASOPHILS NFR BLD AUTO: 0.1 % — SIGNIFICANT CHANGE UP (ref 0–2)
BILIRUB SERPL-MCNC: 0.3 MG/DL — SIGNIFICANT CHANGE UP (ref 0.2–1.2)
BILIRUB SERPL-MCNC: 0.3 MG/DL — SIGNIFICANT CHANGE UP (ref 0.2–1.2)
BILIRUB SERPL-MCNC: 0.4 MG/DL — SIGNIFICANT CHANGE UP (ref 0.2–1.2)
BILIRUB SERPL-MCNC: 0.4 MG/DL — SIGNIFICANT CHANGE UP (ref 0.2–1.2)
BUN SERPL-MCNC: 46 MG/DL — HIGH (ref 7–23)
BUN SERPL-MCNC: 46 MG/DL — HIGH (ref 7–23)
BUN SERPL-MCNC: 47 MG/DL — HIGH (ref 7–23)
BUN SERPL-MCNC: 47 MG/DL — HIGH (ref 7–23)
CALCIUM SERPL-MCNC: 9 MG/DL — SIGNIFICANT CHANGE UP (ref 8.4–10.5)
CALCIUM SERPL-MCNC: 9 MG/DL — SIGNIFICANT CHANGE UP (ref 8.4–10.5)
CALCIUM SERPL-MCNC: 9.2 MG/DL — SIGNIFICANT CHANGE UP (ref 8.4–10.5)
CALCIUM SERPL-MCNC: 9.2 MG/DL — SIGNIFICANT CHANGE UP (ref 8.4–10.5)
CHLORIDE SERPL-SCNC: 102 MMOL/L — SIGNIFICANT CHANGE UP (ref 96–108)
CHLORIDE SERPL-SCNC: 102 MMOL/L — SIGNIFICANT CHANGE UP (ref 96–108)
CHLORIDE SERPL-SCNC: 96 MMOL/L — SIGNIFICANT CHANGE UP (ref 96–108)
CHLORIDE SERPL-SCNC: 96 MMOL/L — SIGNIFICANT CHANGE UP (ref 96–108)
CO2 SERPL-SCNC: 21 MMOL/L — LOW (ref 22–31)
CO2 SERPL-SCNC: 21 MMOL/L — LOW (ref 22–31)
CO2 SERPL-SCNC: 22 MMOL/L — SIGNIFICANT CHANGE UP (ref 22–31)
CO2 SERPL-SCNC: 22 MMOL/L — SIGNIFICANT CHANGE UP (ref 22–31)
CREAT SERPL-MCNC: 2.19 MG/DL — HIGH (ref 0.5–1.3)
CREAT SERPL-MCNC: 2.19 MG/DL — HIGH (ref 0.5–1.3)
CREAT SERPL-MCNC: 2.21 MG/DL — HIGH (ref 0.5–1.3)
CREAT SERPL-MCNC: 2.21 MG/DL — HIGH (ref 0.5–1.3)
CULTURE RESULTS: ABNORMAL
CULTURE RESULTS: ABNORMAL
DIGOXIN SERPL-MCNC: 1.2 NG/ML — SIGNIFICANT CHANGE UP (ref 0.8–2)
DIGOXIN SERPL-MCNC: 1.2 NG/ML — SIGNIFICANT CHANGE UP (ref 0.8–2)
DIGOXIN SERPL-MCNC: 1.3 NG/ML — SIGNIFICANT CHANGE UP (ref 0.8–2)
DIGOXIN SERPL-MCNC: 1.3 NG/ML — SIGNIFICANT CHANGE UP (ref 0.8–2)
EGFR: 34 ML/MIN/1.73M2 — LOW
EOSINOPHIL # BLD AUTO: 0.01 K/UL — SIGNIFICANT CHANGE UP (ref 0–0.5)
EOSINOPHIL # BLD AUTO: 0.01 K/UL — SIGNIFICANT CHANGE UP (ref 0–0.5)
EOSINOPHIL NFR BLD AUTO: 0.1 % — SIGNIFICANT CHANGE UP (ref 0–6)
EOSINOPHIL NFR BLD AUTO: 0.1 % — SIGNIFICANT CHANGE UP (ref 0–6)
GLUCOSE BLDC GLUCOMTR-MCNC: 126 MG/DL — HIGH (ref 70–99)
GLUCOSE BLDC GLUCOMTR-MCNC: 126 MG/DL — HIGH (ref 70–99)
GLUCOSE BLDC GLUCOMTR-MCNC: 127 MG/DL — HIGH (ref 70–99)
GLUCOSE BLDC GLUCOMTR-MCNC: 127 MG/DL — HIGH (ref 70–99)
GLUCOSE BLDC GLUCOMTR-MCNC: 136 MG/DL — HIGH (ref 70–99)
GLUCOSE BLDC GLUCOMTR-MCNC: 136 MG/DL — HIGH (ref 70–99)
GLUCOSE BLDC GLUCOMTR-MCNC: 167 MG/DL — HIGH (ref 70–99)
GLUCOSE BLDC GLUCOMTR-MCNC: 167 MG/DL — HIGH (ref 70–99)
GLUCOSE BLDC GLUCOMTR-MCNC: 176 MG/DL — HIGH (ref 70–99)
GLUCOSE BLDC GLUCOMTR-MCNC: 176 MG/DL — HIGH (ref 70–99)
GLUCOSE BLDC GLUCOMTR-MCNC: 262 MG/DL — HIGH (ref 70–99)
GLUCOSE BLDC GLUCOMTR-MCNC: 262 MG/DL — HIGH (ref 70–99)
GLUCOSE BLDC GLUCOMTR-MCNC: 37 MG/DL — CRITICAL LOW (ref 70–99)
GLUCOSE BLDC GLUCOMTR-MCNC: 37 MG/DL — CRITICAL LOW (ref 70–99)
GLUCOSE BLDC GLUCOMTR-MCNC: 41 MG/DL — CRITICAL LOW (ref 70–99)
GLUCOSE BLDC GLUCOMTR-MCNC: 41 MG/DL — CRITICAL LOW (ref 70–99)
GLUCOSE SERPL-MCNC: 124 MG/DL — HIGH (ref 70–99)
GLUCOSE SERPL-MCNC: 124 MG/DL — HIGH (ref 70–99)
GLUCOSE SERPL-MCNC: 241 MG/DL — HIGH (ref 70–99)
GLUCOSE SERPL-MCNC: 241 MG/DL — HIGH (ref 70–99)
HCT VFR BLD CALC: 37 % — LOW (ref 39–50)
HCT VFR BLD CALC: 37 % — LOW (ref 39–50)
HGB BLD-MCNC: 11.2 G/DL — LOW (ref 13–17)
HGB BLD-MCNC: 11.2 G/DL — LOW (ref 13–17)
IMM GRANULOCYTES NFR BLD AUTO: 0.3 % — SIGNIFICANT CHANGE UP (ref 0–0.9)
IMM GRANULOCYTES NFR BLD AUTO: 0.3 % — SIGNIFICANT CHANGE UP (ref 0–0.9)
INR BLD: 1.83 RATIO — HIGH (ref 0.85–1.18)
INR BLD: 1.83 RATIO — HIGH (ref 0.85–1.18)
INR BLD: 5.49 RATIO — CRITICAL HIGH (ref 0.85–1.18)
INR BLD: 5.49 RATIO — CRITICAL HIGH (ref 0.85–1.18)
LACTATE SERPL-SCNC: 1.5 MMOL/L — SIGNIFICANT CHANGE UP (ref 0.5–2)
LACTATE SERPL-SCNC: 1.5 MMOL/L — SIGNIFICANT CHANGE UP (ref 0.5–2)
LYMPHOCYTES # BLD AUTO: 0.57 K/UL — LOW (ref 1–3.3)
LYMPHOCYTES # BLD AUTO: 0.57 K/UL — LOW (ref 1–3.3)
LYMPHOCYTES # BLD AUTO: 7.8 % — LOW (ref 13–44)
LYMPHOCYTES # BLD AUTO: 7.8 % — LOW (ref 13–44)
MAGNESIUM SERPL-MCNC: 2.3 MG/DL — SIGNIFICANT CHANGE UP (ref 1.6–2.6)
MAGNESIUM SERPL-MCNC: 2.3 MG/DL — SIGNIFICANT CHANGE UP (ref 1.6–2.6)
MCHC RBC-ENTMCNC: 25 PG — LOW (ref 27–34)
MCHC RBC-ENTMCNC: 25 PG — LOW (ref 27–34)
MCHC RBC-ENTMCNC: 30.3 GM/DL — LOW (ref 32–36)
MCHC RBC-ENTMCNC: 30.3 GM/DL — LOW (ref 32–36)
MCV RBC AUTO: 82.6 FL — SIGNIFICANT CHANGE UP (ref 80–100)
MCV RBC AUTO: 82.6 FL — SIGNIFICANT CHANGE UP (ref 80–100)
MONOCYTES # BLD AUTO: 0.41 K/UL — SIGNIFICANT CHANGE UP (ref 0–0.9)
MONOCYTES # BLD AUTO: 0.41 K/UL — SIGNIFICANT CHANGE UP (ref 0–0.9)
MONOCYTES NFR BLD AUTO: 5.6 % — SIGNIFICANT CHANGE UP (ref 2–14)
MONOCYTES NFR BLD AUTO: 5.6 % — SIGNIFICANT CHANGE UP (ref 2–14)
NEUTROPHILS # BLD AUTO: 6.29 K/UL — SIGNIFICANT CHANGE UP (ref 1.8–7.4)
NEUTROPHILS # BLD AUTO: 6.29 K/UL — SIGNIFICANT CHANGE UP (ref 1.8–7.4)
NEUTROPHILS NFR BLD AUTO: 86.1 % — HIGH (ref 43–77)
NEUTROPHILS NFR BLD AUTO: 86.1 % — HIGH (ref 43–77)
NRBC # BLD: 0 /100 WBCS — SIGNIFICANT CHANGE UP (ref 0–0)
NRBC # BLD: 0 /100 WBCS — SIGNIFICANT CHANGE UP (ref 0–0)
PHOSPHATE SERPL-MCNC: 5.6 MG/DL — HIGH (ref 2.5–4.5)
PHOSPHATE SERPL-MCNC: 5.6 MG/DL — HIGH (ref 2.5–4.5)
PLATELET # BLD AUTO: 266 K/UL — SIGNIFICANT CHANGE UP (ref 150–400)
PLATELET # BLD AUTO: 266 K/UL — SIGNIFICANT CHANGE UP (ref 150–400)
POTASSIUM SERPL-MCNC: 3.9 MMOL/L — SIGNIFICANT CHANGE UP (ref 3.5–5.3)
POTASSIUM SERPL-MCNC: 3.9 MMOL/L — SIGNIFICANT CHANGE UP (ref 3.5–5.3)
POTASSIUM SERPL-MCNC: 4.6 MMOL/L — SIGNIFICANT CHANGE UP (ref 3.5–5.3)
POTASSIUM SERPL-MCNC: 4.6 MMOL/L — SIGNIFICANT CHANGE UP (ref 3.5–5.3)
POTASSIUM SERPL-SCNC: 3.9 MMOL/L — SIGNIFICANT CHANGE UP (ref 3.5–5.3)
POTASSIUM SERPL-SCNC: 3.9 MMOL/L — SIGNIFICANT CHANGE UP (ref 3.5–5.3)
POTASSIUM SERPL-SCNC: 4.6 MMOL/L — SIGNIFICANT CHANGE UP (ref 3.5–5.3)
POTASSIUM SERPL-SCNC: 4.6 MMOL/L — SIGNIFICANT CHANGE UP (ref 3.5–5.3)
PROT SERPL-MCNC: 6.4 G/DL — SIGNIFICANT CHANGE UP (ref 6–8.3)
PROT SERPL-MCNC: 6.4 G/DL — SIGNIFICANT CHANGE UP (ref 6–8.3)
PROT SERPL-MCNC: 6.9 G/DL — SIGNIFICANT CHANGE UP (ref 6–8.3)
PROT SERPL-MCNC: 6.9 G/DL — SIGNIFICANT CHANGE UP (ref 6–8.3)
PROTHROM AB SERPL-ACNC: 19.8 SEC — HIGH (ref 9.5–13)
PROTHROM AB SERPL-ACNC: 19.8 SEC — HIGH (ref 9.5–13)
PROTHROM AB SERPL-ACNC: 57.5 SEC — HIGH (ref 9.5–13)
PROTHROM AB SERPL-ACNC: 57.5 SEC — HIGH (ref 9.5–13)
RBC # BLD: 4.48 M/UL — SIGNIFICANT CHANGE UP (ref 4.2–5.8)
RBC # BLD: 4.48 M/UL — SIGNIFICANT CHANGE UP (ref 4.2–5.8)
RBC # FLD: 15.9 % — HIGH (ref 10.3–14.5)
RBC # FLD: 15.9 % — HIGH (ref 10.3–14.5)
SODIUM SERPL-SCNC: 134 MMOL/L — LOW (ref 135–145)
SODIUM SERPL-SCNC: 134 MMOL/L — LOW (ref 135–145)
SODIUM SERPL-SCNC: 136 MMOL/L — SIGNIFICANT CHANGE UP (ref 135–145)
SODIUM SERPL-SCNC: 136 MMOL/L — SIGNIFICANT CHANGE UP (ref 135–145)
SPECIMEN SOURCE: SIGNIFICANT CHANGE UP
SPECIMEN SOURCE: SIGNIFICANT CHANGE UP
WBC # BLD: 7.31 K/UL — SIGNIFICANT CHANGE UP (ref 3.8–10.5)
WBC # BLD: 7.31 K/UL — SIGNIFICANT CHANGE UP (ref 3.8–10.5)
WBC # FLD AUTO: 7.31 K/UL — SIGNIFICANT CHANGE UP (ref 3.8–10.5)
WBC # FLD AUTO: 7.31 K/UL — SIGNIFICANT CHANGE UP (ref 3.8–10.5)

## 2023-11-08 PROCEDURE — 99233 SBSQ HOSP IP/OBS HIGH 50: CPT

## 2023-11-08 PROCEDURE — 93308 TTE F-UP OR LMTD: CPT | Mod: 26

## 2023-11-08 PROCEDURE — 99291 CRITICAL CARE FIRST HOUR: CPT | Mod: GC,25

## 2023-11-08 PROCEDURE — 93321 DOPPLER ECHO F-UP/LMTD STD: CPT | Mod: 26

## 2023-11-08 PROCEDURE — 70450 CT HEAD/BRAIN W/O DYE: CPT | Mod: 26

## 2023-11-08 PROCEDURE — 93010 ELECTROCARDIOGRAM REPORT: CPT

## 2023-11-08 PROCEDURE — 99292 CRITICAL CARE ADDL 30 MIN: CPT

## 2023-11-08 RX ORDER — INSULIN LISPRO 100/ML
6 VIAL (ML) SUBCUTANEOUS
Refills: 0 | Status: DISCONTINUED | OUTPATIENT
Start: 2023-11-08 | End: 2023-11-21

## 2023-11-08 RX ORDER — ISOSORBIDE DINITRATE 5 MG/1
30 TABLET ORAL THREE TIMES A DAY
Refills: 0 | Status: DISCONTINUED | OUTPATIENT
Start: 2023-11-08 | End: 2023-11-16

## 2023-11-08 RX ORDER — AMIODARONE HYDROCHLORIDE 400 MG/1
150 TABLET ORAL ONCE
Refills: 0 | Status: COMPLETED | OUTPATIENT
Start: 2023-11-08 | End: 2023-11-08

## 2023-11-08 RX ORDER — HYDRALAZINE HCL 50 MG
50 TABLET ORAL EVERY 8 HOURS
Refills: 0 | Status: DISCONTINUED | OUTPATIENT
Start: 2023-11-08 | End: 2023-11-08

## 2023-11-08 RX ORDER — DEXTROSE 50 % IN WATER 50 %
50 SYRINGE (ML) INTRAVENOUS ONCE
Refills: 0 | Status: COMPLETED | OUTPATIENT
Start: 2023-11-08 | End: 2023-11-08

## 2023-11-08 RX ORDER — INSULIN GLARGINE 100 [IU]/ML
15 INJECTION, SOLUTION SUBCUTANEOUS AT BEDTIME
Refills: 0 | Status: DISCONTINUED | OUTPATIENT
Start: 2023-11-08 | End: 2023-11-21

## 2023-11-08 RX ORDER — BUMETANIDE 0.25 MG/ML
2 INJECTION INTRAMUSCULAR; INTRAVENOUS ONCE
Refills: 0 | Status: COMPLETED | OUTPATIENT
Start: 2023-11-08 | End: 2023-11-08

## 2023-11-08 RX ORDER — WARFARIN SODIUM 2.5 MG/1
5 TABLET ORAL ONCE
Refills: 0 | Status: COMPLETED | OUTPATIENT
Start: 2023-11-08 | End: 2023-11-08

## 2023-11-08 RX ORDER — AMIODARONE HYDROCHLORIDE 400 MG/1
400 TABLET ORAL EVERY 8 HOURS
Refills: 0 | Status: COMPLETED | OUTPATIENT
Start: 2023-11-08 | End: 2023-11-16

## 2023-11-08 RX ORDER — AMIODARONE HYDROCHLORIDE 400 MG/1
TABLET ORAL
Refills: 0 | Status: DISCONTINUED | OUTPATIENT
Start: 2023-11-08 | End: 2023-11-21

## 2023-11-08 RX ORDER — HYDRALAZINE HCL 50 MG
25 TABLET ORAL ONCE
Refills: 0 | Status: COMPLETED | OUTPATIENT
Start: 2023-11-08 | End: 2023-11-08

## 2023-11-08 RX ORDER — POTASSIUM CHLORIDE 20 MEQ
20 PACKET (EA) ORAL ONCE
Refills: 0 | Status: COMPLETED | OUTPATIENT
Start: 2023-11-08 | End: 2023-11-08

## 2023-11-08 RX ORDER — AMIODARONE HYDROCHLORIDE 400 MG/1
200 TABLET ORAL DAILY
Refills: 0 | Status: DISCONTINUED | OUTPATIENT
Start: 2023-11-17 | End: 2023-11-21

## 2023-11-08 RX ORDER — HYDRALAZINE HCL 50 MG
75 TABLET ORAL EVERY 8 HOURS
Refills: 0 | Status: DISCONTINUED | OUTPATIENT
Start: 2023-11-08 | End: 2023-11-16

## 2023-11-08 RX ORDER — BUMETANIDE 0.25 MG/ML
2 INJECTION INTRAMUSCULAR; INTRAVENOUS
Refills: 0 | Status: DISCONTINUED | OUTPATIENT
Start: 2023-11-08 | End: 2023-11-10

## 2023-11-08 RX ADMIN — WARFARIN SODIUM 5 MILLIGRAM(S): 2.5 TABLET ORAL at 21:57

## 2023-11-08 RX ADMIN — ISOSORBIDE DINITRATE 20 MILLIGRAM(S): 5 TABLET ORAL at 05:05

## 2023-11-08 RX ADMIN — Medication 12 UNIT(S): at 08:46

## 2023-11-08 RX ADMIN — HEPARIN SODIUM 8 UNIT(S)/HR: 5000 INJECTION INTRAVENOUS; SUBCUTANEOUS at 05:31

## 2023-11-08 RX ADMIN — Medication 75 MILLIGRAM(S): at 21:50

## 2023-11-08 RX ADMIN — BUMETANIDE 2 MILLIGRAM(S): 0.25 INJECTION INTRAMUSCULAR; INTRAVENOUS at 09:50

## 2023-11-08 RX ADMIN — Medication 50 MILLILITER(S): at 17:05

## 2023-11-08 RX ADMIN — ATORVASTATIN CALCIUM 80 MILLIGRAM(S): 80 TABLET, FILM COATED ORAL at 21:50

## 2023-11-08 RX ADMIN — Medication 1: at 08:46

## 2023-11-08 RX ADMIN — BUMETANIDE 2 MILLIGRAM(S): 0.25 INJECTION INTRAMUSCULAR; INTRAVENOUS at 17:29

## 2023-11-08 RX ADMIN — PANTOPRAZOLE SODIUM 40 MILLIGRAM(S): 20 TABLET, DELAYED RELEASE ORAL at 05:06

## 2023-11-08 RX ADMIN — Medication 50 MILLIGRAM(S): at 13:29

## 2023-11-08 RX ADMIN — CHLORHEXIDINE GLUCONATE 1 APPLICATION(S): 213 SOLUTION TOPICAL at 05:06

## 2023-11-08 RX ADMIN — Medication 20 MILLIEQUIVALENT(S): at 18:16

## 2023-11-08 RX ADMIN — Medication 81 MILLIGRAM(S): at 09:50

## 2023-11-08 RX ADMIN — ISOSORBIDE DINITRATE 30 MILLIGRAM(S): 5 TABLET ORAL at 17:31

## 2023-11-08 RX ADMIN — Medication 25 MILLIGRAM(S): at 05:06

## 2023-11-08 RX ADMIN — ISOSORBIDE DINITRATE 30 MILLIGRAM(S): 5 TABLET ORAL at 13:29

## 2023-11-08 RX ADMIN — HEPARIN SODIUM 8 UNIT(S)/HR: 5000 INJECTION INTRAVENOUS; SUBCUTANEOUS at 21:51

## 2023-11-08 RX ADMIN — INSULIN GLARGINE 15 UNIT(S): 100 INJECTION, SOLUTION SUBCUTANEOUS at 21:53

## 2023-11-08 RX ADMIN — Medication 25 MILLIGRAM(S): at 09:50

## 2023-11-08 RX ADMIN — AMIODARONE HYDROCHLORIDE 400 MILLIGRAM(S): 400 TABLET ORAL at 21:50

## 2023-11-08 RX ADMIN — PIPERACILLIN AND TAZOBACTAM 25 GRAM(S): 4; .5 INJECTION, POWDER, LYOPHILIZED, FOR SOLUTION INTRAVENOUS at 05:05

## 2023-11-08 RX ADMIN — TAMSULOSIN HYDROCHLORIDE 0.4 MILLIGRAM(S): 0.4 CAPSULE ORAL at 21:50

## 2023-11-08 RX ADMIN — Medication 12 UNIT(S): at 13:26

## 2023-11-08 RX ADMIN — AMIODARONE HYDROCHLORIDE 600 MILLIGRAM(S): 400 TABLET ORAL at 17:33

## 2023-11-08 RX ADMIN — Medication 1: at 21:53

## 2023-11-08 NOTE — PROGRESS NOTE ADULT - ASSESSMENT
58M DM2, HTN, HLD, prior CVA (2012) with mild L sided weakness presenting as a transfer from [facility] for NSTEMI on 10/13/23. His hospital course has been complicated by acute systolic dysfunction, LV thrombus, and possible acute stroke.  - He has had a LHC with triple vessel disease and a RHC with elevated filling pressures    #Acute Hypoxemic Respiratory Failure  - Continue supplemental O2 to maintain O2 sat > 90% - taper down FiO2 as able. Was on 3-4L this AM.  - Incentive spirometer and acapella to facilitate breathing - patient will be able to cooperate with this today  - Hypoxemia most likely related to his cardiovascular disease and pleural effusions    #Pleural Effusion  - Continued attempts at volume removal with close monitoring of I/O and daily weights.   - From a pulmonary standpoint patient requires continued attempts at volume removal/diuresis  - No role for thoracentesis at this time as patient is breathing comfortably and has bilateral effusions in the setting of known cardiovascular disease  - He would have to be off AC if thoracentesis is needed in the future - currently on AC for LV thrombus with Heparin drip and Warfarin    #6 mm pulmonary nodule  - Attention to follow-up on repeat imaging in 3-6 months  - Patient does not have any history of smoking, fevers/night sweats, or reported B-symptoms    #Consolidations  - Patient reportedly treated for pneumonia at [facility] - though unclear duration of antibiotics  - Follow-up results of cultures sent yesterday - procalcitonin not elevated and sputum culture negative  - Can stop antibiotics and monitor    #Lethargy - improved today  - Would consider ABG to evaluate for hypercapnia and oxygenation if mental status worsens  - Neurology follow-up    #Cardiovascular disease  - Continue management as per cardiology - currently with diuresis being held as he does not have peripheral edema but he does have HJR on my exam  - From a pulmonary standpoint he needs continued diuresis - his most recent RHC from 10/30 notes continued elevation in PCWP  - Continue AC for LV thrombus as per Cardiology  - Patient not a candidate for CABG - has triple vessel disease - remains on ASA and statin    #Prophylaxis  - DVT proph  - GI proph  - Aspiration precautions - this will be particularly important given his lethargy/forgetfulness at times  - Incentive spirometer and acapella    Patient's prognosis is guarded.

## 2023-11-08 NOTE — PROGRESS NOTE ADULT - SUBJECTIVE AND OBJECTIVE BOX
Patient seen and examined at bedside.    Overnight Events: Slow VT on the floor last night, upgraded back to CCU, pt states he felt very cold during the night but denies any FC NV CP SOB dizziness palpitations or syncope. At time of exam this morning he is feeling much better. Spoke to patient about possible revascularization, he is amenable if the team thinks it is necessary, he is eager to go home. Neuro attending noted that if CTH in a few days is stable can consider PCI from a hemorrhagic stroke standpoint. Will F/u EP recs and restart bumex    Review of Systems: Negative except as per HPI     Current Meds:  aspirin enteric coated 81 milliGRAM(s) Oral daily  atorvastatin 80 milliGRAM(s) Oral at bedtime  buMETAnide Injectable 2 milliGRAM(s) IV Push two times a day  chlorhexidine 2% Cloths 1 Application(s) Topical <User Schedule>  heparin  Infusion 1100 Unit(s)/Hr IV Continuous <Continuous>  hydrALAZINE 50 milliGRAM(s) Oral every 8 hours  insulin glargine Injectable (LANTUS) 30 Unit(s) SubCutaneous at bedtime  insulin lispro (ADMELOG) corrective regimen sliding scale   SubCutaneous three times a day before meals  insulin lispro (ADMELOG) corrective regimen sliding scale   SubCutaneous at bedtime  insulin lispro Injectable (ADMELOG) 12 Unit(s) SubCutaneous three times a day before meals  isosorbide   dinitrate Tablet (ISORDIL) 30 milliGRAM(s) Oral three times a day  metoprolol succinate ER 25 milliGRAM(s) Oral daily  pantoprazole    Tablet 40 milliGRAM(s) Oral before breakfast  polyethylene glycol 3350 17 Gram(s) Oral daily PRN  senna 2 Tablet(s) Oral at bedtime  tamsulosin 0.4 milliGRAM(s) Oral at bedtime  warfarin 5 milliGRAM(s) Oral once      Vitals:  T(F): 97.9 (11-08), Max: 98.2 (11-08)  HR: 109 (11-08) (100 - 115)  BP: 98/61 (11-08) (98/61 - 133/95)  RR: 18 (11-08)  SpO2: 99% (11-08)  I&O's Summary    07 Nov 2023 07:01  -  08 Nov 2023 07:00  --------------------------------------------------------  IN: 510 mL / OUT: 550 mL / NET: -40 mL    08 Nov 2023 07:01  -  08 Nov 2023 13:32  --------------------------------------------------------  IN: 296 mL / OUT: 0 mL / NET: 296 mL        Physical Exam:  Appearance: No acute distress; well appearing  Eyes: PERRL, EOMI, pink conjunctiva  HEENT: Normal oral mucosa  Cardiovascular: RRR, S1, S2, no murmurs, rubs, or gallops; JVD to mid neck  Respiratory: Clear to auscultation bilaterally  Gastrointestinal: soft, non-tender, non-distended with normal bowel sounds  Musculoskeletal: No clubbing; no joint deformity   Neurologic: Non-focal  Lymphatic: No lymphadenopathy  Psychiatry: AAOx 2-3, mood & affect appropriate  Skin: No rashes, ecchymoses, or cyanosis                          11.2   7.31  )-----------( 266      ( 08 Nov 2023 03:47 )             37.0     11-08    134<L>  |  96  |  46<H>  ----------------------------<  241<H>  4.6   |  22  |  2.19<H>    Ca    9.2      08 Nov 2023 03:47  Phos  5.6     11-08  Mg     2.3     11-08    TPro  6.9  /  Alb  3.6  /  TBili  0.4  /  DBili  x   /  AST  39  /  ALT  49<H>  /  AlkPhos  114  11-08    PT/INR - ( 08 Nov 2023 05:00 )   PT: 19.8 sec;   INR: 1.83 ratio         PTT - ( 08 Nov 2023 11:29 )  PTT:64.2 sec  CARDIAC MARKERS ( 07 Nov 2023 23:17 )  550 ng/L / x     / x     / 49 U/L / x     / 3.4 ng/mL

## 2023-11-08 NOTE — CHART NOTE - NSCHARTNOTEFT_GEN_A_CORE
CICU Accept Note    Transfer from: 4Mon     Accepting physician: Dr. Han     Hospital course:   58M PMHx CVA w/ mild Left sided deficits, HTN, DM2, vertigo, HLD, Mobitz II s/p Medtronic dual chamber ICD (12/21/22) initially presented to [facility] from home complaining of dyspnea and chest pain and was admitted w/ acute hypoxic respiratory failure & pulmonary edema i/s/o HFrEF likely secondary to NSTEMI. Pt with brief MICU stay at [facility] requiring bipap (no intubation), was treated for PNA w/ cefepime. TTE 9/26/23 showing EF 20% w/ severely decreased LVSF, multiple regional WMAs, and elevated LVEDP. Pt was transferred to [facility] for LHC.     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 Medically managed for CAD on floors (2DSU) s/p [facility]. Cardiac MRI 10/18: limited viability in LAD territory. CT surg evaluated pt for intervention, pt is not a good candidate for CABG given lack of viability in the LAD territory on cMRI. HF following pt, considering launching AT eval, however, was holding off on LVAD/transplant eval given cognitive issues likely i/s/o CVA.     On floors, pt w/ elevated lactate and concern for cardiogenic shock- transferred to CICU for swan placement 10/19. Started on dobutamine for inotropic support, with appropriate hemodynamic optimization. [place] and dobutamine d/c'd, and patient was transferred to floors on 10/22.    RRT called on 10/28 for hypotension. Transferred back to CICU for inotropic support with milrinone and swan placement (in cath lab). Weaned off inotropes, swan d/c'd, and transferred to floors on 11/2.    RRT called on 11/7 for asymptomatic sustained VT in 160s with pulse. No intervention given and self-terminated. Transferred back to CICU.    ASSESSMENT & PLAN:   58M PMHx of CVA with mild Left sided deficits, HTN, DM2, vertigo, HLD, Mobitz II s/p Medtronic dual chamber ICD (12/21/22) initially presented to OSH for SOB c/f ADHF vs. PNA, later found to have NSTEMI transferred to [facility] for LHC evaluation. s/p LHC on 10/16 w/ 3v disease, RHC on 10/19 for hemodynamic assessment, cMRI w/ limited viability in LAD territory and TTE 10/28 w/ EF<20% and apical thrombus. Admitted to CICU x2 for cardiogenic shock and x1 for VT.    Plan:  ====================== NEUROLOGY=====================  A&Ox2  - continue to monitor mental status as per protocol     L parietal and R PICA CVA  - L Parietal and R PICA infarct with acute encephalopathy secondary to cardio embolic stroke  - MR head 10/30 w/ R PICA infarct and L parietal infarct, likely due to embolic shower, per neuro. Very mild hemorrhagic transformation in R PICA distribution   - MR neck showing occluded R-sided intracranial vertebral artery of unknown timeframe and mild focal stenosis of the R supraclinoid intracranial internal carotid artery secondary to atherosclerosis  - neurology following  - neuro checks    ==================== RESPIRATORY======================  Acute hypoxic respiratory failure  - stable on 2L NC, wean supplemental O2 as tolerated  - continue to monitor SpO2 with goal >94%    ====================CARDIOVASCULAR==================  Acute on chronic HFrEF exacerabation   - trend lactate  - bumex 2 mg PO QD for diuresis  - hydral 25 BID and isordil 20 TID for afterload reduction   - was on digoxin, c/f possible digoxin toxicity. Dig Level 1.2. Hold off on digoxin at this time.   - Pending further recs from HF team   - strict Is/Os, daily weights  - HF holding off on launching VAD/transplant eval at this time given cognitive issues    VT  - consider amio vs digoxin  - f/u EP reccs    AFib  - rates in 110s  - consider restarting amio vs dig   - c/w heparin gtt for full systemic AC    LV thrombus  - c/w heparin gtt    Multivessel CAD  - c/w ASA, Lipitor   - metoprolol 25 qd    ===================HEMATOLOGIC/ONC ===================  H/H & plts stable  - Monitor H/H and plts  - DVT PPX: heparin gtt    ===================== RENAL =========================  LAURA  - monitor Cr  - Continue monitoring urine output, lytes, SCr/ BUN  - replete lytes prn with goal K >4 and Mg >2    BPH  - c/w flomax .4    ==================== GASTROINTESTINAL===================  DASH diet  Miralax/Senna PRN   PPI     =======================    ENDOCRINE  =====================  DM  - A1c on admission 8.4%  - ISS  - Lantus 30  - premeal 12  - monitor FS    ========================INFECTIOUS DISEASE================  ?PNA  - sputum cx sent w/ GNR, f/u speciation  - c/w Zosyn x48 hrs until speciation  - afebrile, no leukocytosis  - monitor and trend WBC and temperature curve         Patient requires continuous monitoring with bedside rhythm monitoring, arterial line, pulse oximetry, ventilator monitoring and intermittent blood gas analysis.  Care plan discussed with ICU care team.  Patient remained critical; required more than usual post op care; I have spent 35 minutes providing non-routine post op care, in addition to initial critical time provided by CICU attending Dr. Han, re-evaluated multiple times during the day.      May Hanna PA-C

## 2023-11-08 NOTE — PROGRESS NOTE ADULT - ASSESSMENT
59 YO M with a history of CVA with residual mild R paresthesias, second degree Mobitz II heart block s/p DC-ICD 12/2022 (initial concern for sarcoid but negative biopsy/PET post procedure), DM2 (A1c 8.4%), and HTN who was admitted to [facility] with chest pain and dyspnea, found to have NSTEMI with markedly elevated troponins and new severe LV dysfunction with regional wall motion abnormalities as well as + enterovirus. He required NIPPV and was diuresed before being transferred to [facility] where LHC performed which revealed severe 3v CAD with critical proximal LAD involvement. CTS was consulted for CABG evaluation and HF consulted for comanagement.    He ultimately underwent RHC with revealed elevated wedge but normal cardiac output. He was transferred to CICU for swan placement and closer observation of hemodynamics. Found to have LV thrombus. Given concern for low flow status thus sent back to CCU 10/28. He is confused and was found to have R medial cerebellar infarct on head CT, with MRI showing evolving PICA stroke. Given evolving stroke, brain lesion and ongoing confusion he's not currently a candidate for coronary intervention. Additionally, per CTS review, no LAD viability. He's tolerated milrinone being weaned off as of 11/1 with stable end organ function and is tolerating escalation of vasodilators with room for further escalation. His CXR shows ongoing pulmonary vascular congestion. He's volume overloaded on exam with dilated, non collapsable IVC on bedside POCUS. Tachycardia has been improving off milrinone.    REVIEW OF STUDIES  TTE 10/26: EF <20% LVT present, small pericardial effusion w/o tamponade  RHC 10/19: RA 10, RV 47/9/13, Wedge 29, PA 41/26/33, CO/CI 4.4/2.3, PA sat 57.3  EKG: sinus tachycardia, septal q-waves, left axis deviation   TTE 10/16/23: LV 5.5 cm, LVEF 20% with regional WMAs worse in the apex, LVOT VTI 8 cm, normal RV size/function, severe functional MR, small pericardial effusion, estimated RA pressure 8 mmHg   TTE 12/2022: normal LVEF   LHC: 95% discrete proximal LAD disease and sequential multifocal disease with good distal target, 80-90% proximal LCx disease just prior to marginals, severe multifocal RCA disease with good targets   CMR: There is greater than 50% thickness subendocardial LGE within the mid anteroseptum, anterior and anterolateral segments and apical segments.    Hemodynamics:  11/1/23 CVP 13, PA 48/23/35, mVO2 62.6%, Cris CI 2.4  10/21/23: RA 13 with V-wave 21, PA 50/33, PCW 33, MvO2 68.2, Cris CO/CI 4.4/2.56

## 2023-11-08 NOTE — PROVIDER CONTACT NOTE (HYPOGLYCEMIA EVENT) - NS PROVIDER CONTACT BACKGROUND-HYPO
Age: 58y    Gender: Male    POCT Blood Glucose:  127 mg/dL (11-08-23 @ 17:25)  37 mg/dL (11-08-23 @ 17:02)  41 mg/dL (11-08-23 @ 17:01)  126 mg/dL (11-08-23 @ 13:20)  176 mg/dL (11-08-23 @ 08:37)  165 mg/dL (11-07-23 @ 22:52)  159 mg/dL (11-07-23 @ 22:29)  46 mg/dL (11-07-23 @ 22:05)      eMAR:atorvastatin   80 milliGRAM(s) Oral (11-07-23 @ 22:14)    dextrose 50% Injectable   50 milliLiter(s) IV Push (11-07-23 @ 22:16)    dextrose 50% Injectable   50 milliLiter(s) IV Push (11-08-23 @ 17:05)    insulin lispro (ADMELOG) corrective regimen sliding scale   1 Unit(s) SubCutaneous (11-08-23 @ 08:46)    insulin lispro Injectable (ADMELOG)   12 Unit(s) SubCutaneous (11-08-23 @ 13:26)   12 Unit(s) SubCutaneous (11-08-23 @ 08:46)    
Age: 58y    Gender: Male    POCT Blood Glucose:  111 mg/dL (11-05-23 @ 19:29)  87 mg/dL (11-05-23 @ 19:08)  76 mg/dL (11-05-23 @ 18:48)  43 mg/dL (11-05-23 @ 18:28)  43 mg/dL (11-05-23 @ 18:27)  72 mg/dL (11-05-23 @ 16:37)  111 mg/dL (11-05-23 @ 11:53)  192 mg/dL (11-05-23 @ 07:58)      eMAR:atorvastatin   80 milliGRAM(s) Oral (11-04-23 @ 21:53)    insulin glargine Injectable (LANTUS)   30 Unit(s) SubCutaneous (11-04-23 @ 21:54)    insulin lispro (ADMELOG) corrective regimen sliding scale   1 Unit(s) SubCutaneous (11-05-23 @ 08:47)    insulin lispro Injectable (ADMELOG)   12 Unit(s) SubCutaneous (11-05-23 @ 16:58)   12 Unit(s) SubCutaneous (11-05-23 @ 12:28)   12 Unit(s) SubCutaneous (11-05-23 @ 08:47)

## 2023-11-08 NOTE — CHART NOTE - NSCHARTNOTEFT_GEN_A_CORE
CICU Transfer Note    Transfer from: CICU    Transfer to: (  ) Medicine    ( x ) Telemetry     (   ) RCU        (    ) Palliative         (   ) Stroke Unit          (   ) __________________    Accepting Physician:  Signout given to:     CICU COURSE:  58M PMHx CVA w/ mild Left sided deficits, HTN, DM2, vertigo, HLD, Mobitz II s/p Medtronic dual chamber ICD (12/21/22) initially presented to [facility] from home complaining of dyspnea and chest pain and was admitted w/ acute hypoxic respiratory failure & pulmonary edema i/s/o HFrEF likely secondary to NSTEMI. Pt with brief MICU stay at [facility] requiring bipap (no intubation), was treated for PNA w/ cefepime. TTE 9/26/23 showing EF 20% w/ severely decreased LVSF, multiple regional WMAs, and elevated LVEDP. Pt was transferred to [facility] for LHC.    Overnight 11/7 was found to have EF <20% had episode of 12 beats of vtach, followed by several additional episodes of Vtach. Unknown if this has happened in the past however pt has extensive cardiac history. Pt has no complaints at this time, and is asymptomatic throughout episodes. Denies chest pain, no dizziness, no N/V, no palpitations. Does complain of feeling cold. Pt was trying to fall asleep when this occurred. Pt is also on pt specific heparin. PTT found to be 118 so pt specific was decreased by 1. Pt then found to have FS of 48; 25g dextrose (D50): 50cc given. Pt continued to have episodes of Vtach and rapid response was called. Pt terminated Vtach without intervention.     In the CICU, patient was found to be hypervolemic on physical exam and was started on Bumex 2 BID. Hydral was increased to 50 and Isordil to 30 TID for afterload reduction. Zosyn was d/c due to no growth on sputum cultures. Patient is receiving an amio load for a total of 10 grams with 400mg q8 A CT Head was obtained for the possibility of PCI next week to reopen discussion on advanced heart therapies. Palliative was consulted due to frequent CICU readmissions and poor prognosis should there be no interventions.      Patient is hemodynamically stable and ready for transfer out of the CICU.    ASSESSMENT & PLAN:   58M PMHx of CVA with mild Left sided deficits, HTN, DM2, vertigo, HLD, Mobitz II s/p Medtronic dual chamber ICD (12/21/22) initially presented to OSH for SOB c/f ADHF vs. PNA, later found to have NSTEMI transferred to [facility] for LHC evaluation. s/p LHC on 10/16 w/ 3v disease, RHC on 10/19 for hemodynamic assessment, cMRI w/ limited viability in LAD territory and TTE 10/28 w/ EF<20% and apical thrombus. Admitted to CICU x2 for cardiogenic shock and x1 for VT.    Plan:  ====================== NEUROLOGY=====================  A&Ox3  - continue to monitor mental status as per protocol    L parietal and R PICA CVA  - L Parietal and R PICA infarct with acute encephalopathy secondary to cardio embolic stroke  - MR head 10/30 w/ R PICA infarct and L parietal infarct, likely due to embolic shower, per neuro. Very mild hemorrhagic transformation in R PICA distribution   - MR neck showing occluded R-sided intracranial vertebral artery of unknown timeframe and mild focal stenosis of the R supraclinoid intracranial internal carotid artery secondary to atherosclerosis  - neurology following  - neuro checks  - Repeat CTH in the end of the week for possible PCI staging    ==================== RESPIRATORY======================  Acute hypoxic respiratory failure  - stable on 2L NC, wean supplemental O2 as tolerated  - continue to monitor SpO2 with goal >94%    ====================CARDIOVASCULAR==================  Acute on chronic HFrEF exacerabation   - trend lactate  - bumex 2 mg PO QD for diuresis  - hydral 50 BID and isordil 30 TID for afterload reduction   - was on digoxin, c/f possible digoxin toxicity. Dig Level 1.2. Hold off on digoxin at this time.   - Pending further recs from HF team   - strict Is/Os, daily weights  - Start Bumex 2 IVP BID for diuresis  - HF holding off on launching VAD/transplant eval at this time given cognitive issues    VT  - consider amio vs digoxin  - f/u EP reccs    AFib  - rates in 110s  - consider restarting amio vs dig   - c/w heparin gtt for full systemic AC    LV thrombus  - c/w heparin gtt  - Start heparin to coumadin bridge    Multivessel CAD  - c/w ASA, Lipitor   - metoprolol 25 qd  - Possible PCI planning    ===================HEMATOLOGIC/ONC ===================  H/H & plts stable  - Monitor H/H and plts  - DVT PPX: heparin gtt     ===================== RENAL =========================  LAURA  - monitor Cr  - Continue monitoring urine output, lytes, SCr/ BUN  - replete lytes prn with goal K >4 and Mg >2    BPH  - c/w flomax .4    ==================== GASTROINTESTINAL===================  DASH diet  Miralax/Senna PRN   PPI     =======================    ENDOCRINE  =====================  DM  - A1c on admission 8.4%  - ISS  - Lantus 30  - premeal 12  - monitor FS    ========================INFECTIOUS DISEASE================  ?PNA  - sputum cx sent w/ GNR, f/u speciation  - c/w Zosyn x48 hrs until speciation  - D/c Zosyn no growth on sputum cultures  - afebrile, no leukocytosis  - monitor and trend WBC and temperature curve           FOR FOLLOW UP:  [ ] Heart failure reccs  [ ] Palliative consult  [ ] EP reccs CICU Transfer Note    Transfer from: CICU    Transfer to: (  ) Medicine    ( x ) Telemetry     (   ) RCU        (    ) Palliative         (   ) Stroke Unit          (   ) __________________    Accepting Physician: Ernestina Cabrera  Signout given to:     CICU COURSE:  58M PMHx CVA w/ mild Left sided deficits, HTN, DM2, vertigo, HLD, Mobitz II s/p Medtronic dual chamber ICD (12/21/22) initially presented to [facility] from home complaining of dyspnea and chest pain and was admitted w/ acute hypoxic respiratory failure & pulmonary edema i/s/o HFrEF likely secondary to NSTEMI. Pt with brief MICU stay at [facility] requiring bipap (no intubation), was treated for PNA w/ cefepime. TTE 9/26/23 showing EF 20% w/ severely decreased LVSF, multiple regional WMAs, and elevated LVEDP. Pt was transferred to [facility] for LHC.    Overnight 11/7 was found to have EF <20% had episode of 12 beats of vtach, followed by several additional episodes of Vtach. Unknown if this has happened in the past however pt has extensive cardiac history. Pt has no complaints at this time, and is asymptomatic throughout episodes. Denies chest pain, no dizziness, no N/V, no palpitations. Does complain of feeling cold. Pt was trying to fall asleep when this occurred. Pt is also on pt specific heparin. PTT found to be 118 so pt specific was decreased by 1. Pt then found to have FS of 48; 25g dextrose (D50): 50cc given. Pt continued to have episodes of Vtach and rapid response was called. Pt terminated Vtach without intervention.     In the CICU, patient was found to be hypervolemic on physical exam and was started on Bumex 2 BID. Hydral was increased to 50 and Isordil to 30 TID for afterload reduction. Zosyn was d/c due to no growth on sputum cultures. Patient is receiving an amio load for a total of 10 grams with 400mg q8 A CT Head was obtained for the possibility of PCI next week to reopen discussion on advanced heart therapies. Palliative was consulted due to frequent CICU readmissions and poor prognosis should there be no interventions.      Patient is hemodynamically stable and ready for transfer out of the CICU.    ASSESSMENT & PLAN:   58M PMHx of CVA with mild Left sided deficits, HTN, DM2, vertigo, HLD, Mobitz II s/p Medtronic dual chamber ICD (12/21/22) initially presented to OSH for SOB c/f ADHF vs. PNA, later found to have NSTEMI transferred to [facility] for LHC evaluation. s/p LHC on 10/16 w/ 3v disease, RHC on 10/19 for hemodynamic assessment, cMRI w/ limited viability in LAD territory and TTE 10/28 w/ EF<20% and apical thrombus. Admitted to CICU x2 for cardiogenic shock and x1 for VT.    Plan:  ====================== NEUROLOGY=====================  A&Ox3  - continue to monitor mental status as per protocol    L parietal and R PICA CVA  - L Parietal and R PICA infarct with acute encephalopathy secondary to cardio embolic stroke  - MR head 10/30 w/ R PICA infarct and L parietal infarct, likely due to embolic shower, per neuro. Very mild hemorrhagic transformation in R PICA distribution   - MR neck showing occluded R-sided intracranial vertebral artery of unknown timeframe and mild focal stenosis of the R supraclinoid intracranial internal carotid artery secondary to atherosclerosis  - neurology following  - neuro checks  - Repeat CTH in the end of the week for possible PCI staging    ==================== RESPIRATORY======================  Acute hypoxic respiratory failure  - stable on 2L NC, wean supplemental O2 as tolerated  - continue to monitor SpO2 with goal >94%    ====================CARDIOVASCULAR==================  Acute on chronic HFrEF exacerabation   - trend lactate  - bumex 2 mg PO QD for diuresis  - hydral 50 BID and isordil 30 TID for afterload reduction   - was on digoxin, c/f possible digoxin toxicity. Dig Level 1.2. Hold off on digoxin at this time.   - Pending further recs from HF team   - strict Is/Os, daily weights  - Start Bumex 2 IVP BID for diuresis  - HF holding off on launching VAD/transplant eval at this time given cognitive issues    VT  - consider amio vs digoxin  - f/u EP reccs    AFib  - rates in 110s  - consider restarting amio vs dig   - c/w heparin gtt for full systemic AC    LV thrombus  - c/w heparin gtt  - Start heparin to coumadin bridge    Multivessel CAD  - c/w ASA, Lipitor   - metoprolol 25 qd  - Possible PCI planning    ===================HEMATOLOGIC/ONC ===================  H/H & plts stable  - Monitor H/H and plts  - DVT PPX: heparin gtt     ===================== RENAL =========================  LAURA  - monitor Cr  - Continue monitoring urine output, lytes, SCr/ BUN  - replete lytes prn with goal K >4 and Mg >2    BPH  - c/w flomax .4    ==================== GASTROINTESTINAL===================  DASH diet  Miralax/Senna PRN   PPI     =======================    ENDOCRINE  =====================  DM  - A1c on admission 8.4%  - ISS  - Lantus 30  - premeal 12  - monitor FS    ========================INFECTIOUS DISEASE================  ?PNA  - sputum cx sent w/ GNR, f/u speciation  - c/w Zosyn x48 hrs until speciation  - D/c Zosyn no growth on sputum cultures  - afebrile, no leukocytosis  - monitor and trend WBC and temperature curve           FOR FOLLOW UP:  [ ] Heart failure reccs  [ ] Palliative consult  [ ] EP reccs CICU Transfer Note    Transfer from: CICU    Transfer to: (  ) Medicine    ( x ) Telemetry     (   ) RCU        (    ) Palliative         (   ) Stroke Unit          (   ) __________________    Accepting Physician: Ernestina Cabrera  Signout given to: HUY Jennifer - [phone]    CICU COURSE:  58M PMHx CVA w/ mild Left sided deficits, HTN, DM2, vertigo, HLD, Mobitz II s/p Medtronic dual chamber ICD (12/21/22) initially presented to [facility] from home complaining of dyspnea and chest pain and was admitted w/ acute hypoxic respiratory failure & pulmonary edema i/s/o HFrEF likely secondary to NSTEMI. Pt with brief MICU stay at [facility] requiring bipap (no intubation), was treated for PNA w/ cefepime. TTE 9/26/23 showing EF 20% w/ severely decreased LVSF, multiple regional WMAs, and elevated LVEDP. Pt was transferred to [facility] for LHC.    Overnight 11/7 was found to have EF <20% had episode of 12 beats of vtach, followed by several additional episodes of Vtach. Unknown if this has happened in the past however pt has extensive cardiac history. Pt has no complaints at this time, and is asymptomatic throughout episodes. Denies chest pain, no dizziness, no N/V, no palpitations. Does complain of feeling cold. Pt was trying to fall asleep when this occurred. Pt is also on pt specific heparin. PTT found to be 118 so pt specific was decreased by 1. Pt then found to have FS of 48; 25g dextrose (D50): 50cc given. Pt continued to have episodes of Vtach and rapid response was called. Pt terminated Vtach without intervention.     In the CICU, patient was found to be hypervolemic on physical exam and was started on Bumex 2 BID. Hydral was increased to 50 and Isordil to 30 TID for afterload reduction. Zosyn was d/c due to no growth on sputum cultures. Patient is receiving an amio load for a total of 10 grams with 400mg q8 A CT Head was obtained for the possibility of PCI next week to reopen discussion on advanced heart therapies. Palliative was consulted due to frequent CICU readmissions and poor prognosis should there be no interventions.      Patient is hemodynamically stable and ready for transfer out of the CICU.    ASSESSMENT & PLAN:   58M PMHx of CVA with mild Left sided deficits, HTN, DM2, vertigo, HLD, Mobitz II s/p Medtronic dual chamber ICD (12/21/22) initially presented to OSH for SOB c/f ADHF vs. PNA, later found to have NSTEMI transferred to [facility] for LHC evaluation. s/p LHC on 10/16 w/ 3v disease, RHC on 10/19 for hemodynamic assessment, cMRI w/ limited viability in LAD territory and TTE 10/28 w/ EF<20% and apical thrombus. Admitted to CICU x2 for cardiogenic shock and x1 for VT.    Plan:  ====================== NEUROLOGY=====================  A&Ox3  - continue to monitor mental status as per protocol    L parietal and R PICA CVA  - L Parietal and R PICA infarct with acute encephalopathy secondary to cardio embolic stroke  - MR head 10/30 w/ R PICA infarct and L parietal infarct, likely due to embolic shower, per neuro. Very mild hemorrhagic transformation in R PICA distribution   - MR neck showing occluded R-sided intracranial vertebral artery of unknown timeframe and mild focal stenosis of the R supraclinoid intracranial internal carotid artery secondary to atherosclerosis  - neurology following  - neuro checks  - Repeat CTH in the end of the week for possible PCI staging    ==================== RESPIRATORY======================  Acute hypoxic respiratory failure  - stable on 2L NC, wean supplemental O2 as tolerated  - continue to monitor SpO2 with goal >94%    ====================CARDIOVASCULAR==================  Acute on chronic HFrEF exacerabation   - trend lactate  - bumex 2 mg PO QD for diuresis  - hydral 50 BID and isordil 30 TID for afterload reduction   - was on digoxin, c/f possible digoxin toxicity. Dig Level 1.2. Hold off on digoxin at this time.   - Pending further recs from HF team   - strict Is/Os, daily weights  - Start Bumex 2 IVP BID for diuresis  - HF holding off on launching VAD/transplant eval at this time given cognitive issues    VT  - consider amio vs digoxin  - f/u EP reccs    AFib  - rates in 110s  - consider restarting amio vs dig   - c/w heparin gtt for full systemic AC    LV thrombus  - c/w heparin gtt  - Start heparin to coumadin bridge    Multivessel CAD  - c/w ASA, Lipitor   - metoprolol 25 qd  - Possible PCI planning    ===================HEMATOLOGIC/ONC ===================  H/H & plts stable  - Monitor H/H and plts  - DVT PPX: heparin gtt     ===================== RENAL =========================  LAURA  - monitor Cr  - Continue monitoring urine output, lytes, SCr/ BUN  - replete lytes prn with goal K >4 and Mg >2    BPH  - c/w flomax .4    ==================== GASTROINTESTINAL===================  DASH diet  Miralax/Senna PRN   PPI     =======================    ENDOCRINE  =====================  DM  - A1c on admission 8.4%  - ISS  - Lantus 30  - premeal 12  - monitor FS    ========================INFECTIOUS DISEASE================  ?PNA  - sputum cx sent w/ GNR, f/u speciation  - c/w Zosyn x48 hrs until speciation  - D/c Zosyn no growth on sputum cultures  - afebrile, no leukocytosis  - monitor and trend WBC and temperature curve           FOR FOLLOW UP:  [ ] bumex and hydralazine/isoridl for afterload reduction  [] amio load   [] Heart failure reccs  [ ] Palliative consult  [ ] EP reccs

## 2023-11-08 NOTE — PROGRESS NOTE ADULT - SUBJECTIVE AND OBJECTIVE BOX
Patient is a 58y old  Male who presents with a chief complaint of NSTEMI (2023 04:52)    HPI:  Patient with separate chart from [facility], MRN# [id number]    57 yo Male pmhx CVA with mild left sided deficits, HTN, DM2, vertigo, HLD, Mobitz II s/pp Medtronic dual chamber ICD (22) initially presented to [facility] from home with complaints of dyspnea and chest pain and was admitted w/ acute hypoxic respiratory failure likely due to acute pulmonary edema due to acute HFrEF in setting of acute NSTEMI, LAURA and enterovirus infection. Pt with brief MICU stay at [facility] requiring bipap (no intubation), was treated for PNA with cefepime, and was found to have TTE done 23 showing EF 20% with severely decreased LVSF, multiple regional wall motion abnormalities, and elevated LV end diastolic pressure. Pt was transferred to [facility] for cardiac cath evaluation. Patient without any acute complaints, complaining of intermittent palpitations for the last 2 days. No chest pain, SOB, fevers, nausea, vomiting, abdominal pain.  (13 Oct 2023 21:05)       INTERVAL HPI/OVERNIGHT EVENTS:   No overnight events   Afebrile, hemodynamically stable     Subjective:    ICU Vital Signs Last 24 Hrs  T(C): 36.8 (2023 03:00), Max: 37.2 (2023 12:25)  T(F): 98.2 (2023 03:00), Max: 98.9 (2023 12:25)  HR: 110 (2023 06:00) (100 - 115)  BP: 120/77 (2023 06:00) (102/66 - 133/95)  BP(mean): 94 (2023 06:00) (94 - 110)  ABP: --  ABP(mean): --  RR: 20 (2023 06:00) (18 - 20)  SpO2: 92% (2023 06:00) (92% - 99%)    O2 Parameters below as of 2023 03:00  Patient On (Oxygen Delivery Method): nasal cannula  O2 Flow (L/min): 2        I&O's Summary    2023 07:01  -  2023 07:00  --------------------------------------------------------  IN: 510 mL / OUT: 550 mL / NET: -40 mL          Daily     Daily Weight in k.7 (2023 09:08)    Adult Advanced Hemodynamics Last 24 Hrs  CVP(mm Hg): --  CVP(cm H2O): --  CO: --  CI: --  PA: --  PA(mean): --  PCWP: --  SVR: --  SVRI: --  PVR: --  PVRI: --    EKG/Telemetry Events:    MEDICATIONS  (STANDING):  aspirin enteric coated 81 milliGRAM(s) Oral daily  atorvastatin 80 milliGRAM(s) Oral at bedtime  chlorhexidine 2% Cloths 1 Application(s) Topical <User Schedule>  heparin  Infusion 1100 Unit(s)/Hr (8 mL/Hr) IV Continuous <Continuous>  hydrALAZINE 25 milliGRAM(s) Oral two times a day  insulin glargine Injectable (LANTUS) 30 Unit(s) SubCutaneous at bedtime  insulin lispro (ADMELOG) corrective regimen sliding scale   SubCutaneous at bedtime  insulin lispro (ADMELOG) corrective regimen sliding scale   SubCutaneous three times a day before meals  insulin lispro Injectable (ADMELOG) 12 Unit(s) SubCutaneous three times a day before meals  isosorbide   dinitrate Tablet (ISORDIL) 20 milliGRAM(s) Oral three times a day  metoprolol succinate ER 25 milliGRAM(s) Oral daily  pantoprazole    Tablet 40 milliGRAM(s) Oral before breakfast  piperacillin/tazobactam IVPB.. 3.375 Gram(s) IV Intermittent every 8 hours  senna 2 Tablet(s) Oral at bedtime  tamsulosin 0.4 milliGRAM(s) Oral at bedtime    MEDICATIONS  (PRN):  polyethylene glycol 3350 17 Gram(s) Oral daily PRN Constipation      PHYSICAL EXAM:  GENERAL:   HEAD:  Atraumatic, Normocephalic  EYES: EOMI, PERRLA, conjunctiva and sclera clear  NECK: Supple, No JVD, Normal thyroid, no enlarged nodes  NERVOUS SYSTEM:  Alert & Awake.   CHEST/LUNG: B/L good air entry; No rales, rhonchi, or wheezing  HEART: S1S2 normal, no S3, Regular rate and rhythm; No murmurs  ABDOMEN: Soft, Nontender, Nondistended; Bowel sounds present  EXTREMITIES:  2+ Peripheral Pulses, No clubbing, cyanosis, or edema  LYMPH: No lymphadenopathy noted  SKIN: No rashes or lesions    LABS:                        11.2   7.31  )-----------( 266      ( 2023 03:47 )             37.0         134<L>  |  96  |  46<H>  ----------------------------<  241<H>  4.6   |  22  |  2.19<H>    Ca    9.2      2023 03:47  Phos  5.6       Mg     2.3         TPro  6.9  /  Alb  3.6  /  TBili  0.4  /  DBili  x   /  AST  39  /  ALT  49<H>  /  AlkPhos  114      LIVER FUNCTIONS - ( 2023 03:47 )  Alb: 3.6 g/dL / Pro: 6.9 g/dL / ALK PHOS: 114 U/L / ALT: 49 U/L / AST: 39 U/L / GGT: x           PT/INR - ( 2023 05:00 )   PT: 19.8 sec;   INR: 1.83 ratio         PTT - ( 2023 05:00 )  PTT:129.6 sec  CAPILLARY BLOOD GLUCOSE      POCT Blood Glucose.: 165 mg/dL (2023 22:52)  POCT Blood Glucose.: 159 mg/dL (2023 22:29)  POCT Blood Glucose.: 46 mg/dL (2023 22:05)  POCT Blood Glucose.: 49 mg/dL (2023 22:02)  POCT Blood Glucose.: 184 mg/dL (2023 16:44)  POCT Blood Glucose.: 242 mg/dL (2023 12:31)  POCT Blood Glucose.: 79 mg/dL (2023 07:49)      CKMB Units: 3.4 ng/mL ( @ 23:17)  Creatine Kinase, Serum: 49 U/L (11-07 @ 23:17)    CARDIAC MARKERS ( 2023 23:17 )  x     / x     / 49 U/L / x     / 3.4 ng/mL      Urinalysis Basic - ( 2023 03:47 )    Color: x / Appearance: x / SG: x / pH: x  Gluc: 241 mg/dL / Ketone: x  / Bili: x / Urobili: x   Blood: x / Protein: x / Nitrite: x   Leuk Esterase: x / RBC: x / WBC x   Sq Epi: x / Non Sq Epi: x / Bacteria: x          RADIOLOGY & ADDITIONAL TESTS:  CXR:        Care Discussed with Consultants/Other Providers [ x] YES  [ ] NO           Patient is a 58y old  Male who presents with a chief complaint of NSTEMI (2023 04:52)    HPI:  Patient with separate chart from [facility], MRN# [id number]    57 yo Male pmhx CVA with mild left sided deficits, HTN, DM2, vertigo, HLD, Mobitz II s/pp Medtronic dual chamber ICD (22) initially presented to [facility] from home with complaints of dyspnea and chest pain and was admitted w/ acute hypoxic respiratory failure likely due to acute pulmonary edema due to acute HFrEF in setting of acute NSTEMI, LAURA and enterovirus infection. Pt with brief MICU stay at [facility] requiring bipap (no intubation), was treated for PNA with cefepime, and was found to have TTE done 23 showing EF 20% with severely decreased LVSF, multiple regional wall motion abnormalities, and elevated LV end diastolic pressure. Pt was transferred to [facility] for cardiac cath evaluation. Patient without any acute complaints, complaining of intermittent palpitations for the last 2 days. No chest pain, SOB, fevers, nausea, vomiting, abdominal pain.  (13 Oct 2023 21:05)       INTERVAL HPI/OVERNIGHT EVENTS:     Subjective: Patient seen and examined at the bedside. Reports no acute complaints. Patient denies cp, abdominal pain, dysuria, lower leg swelling.     ICU Vital Signs Last 24 Hrs  T(C): 36.8 (2023 03:00), Max: 37.2 (2023 12:25)  T(F): 98.2 (2023 03:00), Max: 98.9 (2023 12:25)  HR: 110 (2023 06:00) (100 - 115)  BP: 120/77 (2023 06:00) (102/66 - 133/95)  BP(mean): 94 (2023 06:00) (94 - 110)  ABP: --  ABP(mean): --  RR: 20 (2023 06:00) (18 - 20)  SpO2: 92% (2023 06:00) (92% - 99%)    O2 Parameters below as of 2023 03:00  Patient On (Oxygen Delivery Method): nasal cannula  O2 Flow (L/min): 2        I&O's Summary    2023 07:01  -  2023 07:00  --------------------------------------------------------  IN: 510 mL / OUT: 550 mL / NET: -40 mL          Daily     Daily Weight in k.7 (2023 09:08)    Adult Advanced Hemodynamics Last 24 Hrs  CVP(mm Hg): --  CVP(cm H2O): --  CO: --  CI: --  PA: --  PA(mean): --  PCWP: --  SVR: --  SVRI: --  PVR: --  PVRI: --    EKG/Telemetry Events:    MEDICATIONS  (STANDING):  aspirin enteric coated 81 milliGRAM(s) Oral daily  atorvastatin 80 milliGRAM(s) Oral at bedtime  chlorhexidine 2% Cloths 1 Application(s) Topical <User Schedule>  heparin  Infusion 1100 Unit(s)/Hr (8 mL/Hr) IV Continuous <Continuous>  hydrALAZINE 25 milliGRAM(s) Oral two times a day  insulin glargine Injectable (LANTUS) 30 Unit(s) SubCutaneous at bedtime  insulin lispro (ADMELOG) corrective regimen sliding scale   SubCutaneous at bedtime  insulin lispro (ADMELOG) corrective regimen sliding scale   SubCutaneous three times a day before meals  insulin lispro Injectable (ADMELOG) 12 Unit(s) SubCutaneous three times a day before meals  isosorbide   dinitrate Tablet (ISORDIL) 20 milliGRAM(s) Oral three times a day  metoprolol succinate ER 25 milliGRAM(s) Oral daily  pantoprazole    Tablet 40 milliGRAM(s) Oral before breakfast  piperacillin/tazobactam IVPB.. 3.375 Gram(s) IV Intermittent every 8 hours  senna 2 Tablet(s) Oral at bedtime  tamsulosin 0.4 milliGRAM(s) Oral at bedtime    MEDICATIONS  (PRN):  polyethylene glycol 3350 17 Gram(s) Oral daily PRN Constipation      PHYSICAL EXAM:  HEAD:  Atraumatic, Normocephalic  EYES: EOMI, PERRLA, conjunctiva and sclera clear  NECK: Supple, +JVD  NERVOUS SYSTEM:  Alert & Awake.   CHEST/LUNG: B/L good air entry; No rales, rhonchi, or wheezing  HEART: S1S2 normal, no S3, Regular rate and rhythm; No murmurs  ABDOMEN: Soft, Nontender, Nondistended; Bowel sounds present  EXTREMITIES:  2+ Peripheral Pulses, No clubbing, cyanosis, or edema    LABS:                        11.2   7.31  )-----------( 266      ( 2023 03:47 )             37.0         134<L>  |  96  |  46<H>  ----------------------------<  241<H>  4.6   |  22  |  2.19<H>    Ca    9.2      2023 03:47  Phos  5.6       Mg     2.3         TPro  6.9  /  Alb  3.6  /  TBili  0.4  /  DBili  x   /  AST  39  /  ALT  49<H>  /  AlkPhos  114      LIVER FUNCTIONS - ( 2023 03:47 )  Alb: 3.6 g/dL / Pro: 6.9 g/dL / ALK PHOS: 114 U/L / ALT: 49 U/L / AST: 39 U/L / GGT: x           PT/INR - ( 2023 05:00 )   PT: 19.8 sec;   INR: 1.83 ratio         PTT - ( 2023 05:00 )  PTT:129.6 sec  CAPILLARY BLOOD GLUCOSE      POCT Blood Glucose.: 165 mg/dL (2023 22:52)  POCT Blood Glucose.: 159 mg/dL (2023 22:29)  POCT Blood Glucose.: 46 mg/dL (2023 22:05)  POCT Blood Glucose.: 49 mg/dL (2023 22:02)  POCT Blood Glucose.: 184 mg/dL (2023 16:44)  POCT Blood Glucose.: 242 mg/dL (2023 12:31)  POCT Blood Glucose.: 79 mg/dL (2023 07:49)      CKMB Units: 3.4 ng/mL ( @ 23:17)  Creatine Kinase, Serum: 49 U/L ( @ 23:17)    CARDIAC MARKERS ( 2023 23:17 )  x     / x     / 49 U/L / x     / 3.4 ng/mL      Urinalysis Basic - ( 2023 03:47 )    Color: x / Appearance: x / SG: x / pH: x  Gluc: 241 mg/dL / Ketone: x  / Bili: x / Urobili: x   Blood: x / Protein: x / Nitrite: x   Leuk Esterase: x / RBC: x / WBC x   Sq Epi: x / Non Sq Epi: x / Bacteria: x          RADIOLOGY & ADDITIONAL TESTS:  CXR:        Care Discussed with Consultants/Other Providers [ x] YES  [ ] NO

## 2023-11-08 NOTE — PROGRESS NOTE ADULT - PROBLEM SELECTOR PLAN 2
- 95% discrete proximal LAD disease and sequential multifocal disease with good distal target, 80-90% proximal LCx disease just prior to marginals, severe multifocal RCA. No viability on MRI.  - There is greater than 50% thickness subendocardial LGE within the mid anteroseptum, anterior and anterolateral segments and apical segments  - May consider revascularization for EF improvement, will speak to IC team

## 2023-11-08 NOTE — PROGRESS NOTE ADULT - ASSESSMENT
58M PMHx of CVA with mild Left sided deficits, HTN, DM2, vertigo, HLD, Mobitz II s/p Medtronic dual chamber ICD (12/21/22) initially presented to OSH for SOB c/f ADHF vs. PNA, later found to have NSTEMI transferred to [facility] for LHC evaluation. s/p LHC on 10/16 w/ 3v disease, RHC on 10/19 for hemodynamic assessment, cMRI w/ limited viability in LAD territory and TTE 10/28 w/ EF<20% and apical thrombus. Admitted to CICU x2 for cardiogenic shock and x1 for VT.    Plan:  ====================== NEUROLOGY=====================  A&Ox2  - continue to monitor mental status as per protocol     L parietal and R PICA CVA  - L Parietal and R PICA infarct with acute encephalopathy secondary to cardio embolic stroke  - MR head 10/30 w/ R PICA infarct and L parietal infarct, likely due to embolic shower, per neuro. Very mild hemorrhagic transformation in R PICA distribution   - MR neck showing occluded R-sided intracranial vertebral artery of unknown timeframe and mild focal stenosis of the R supraclinoid intracranial internal carotid artery secondary to atherosclerosis  - neurology following  - neuro checks    ==================== RESPIRATORY======================  Acute hypoxic respiratory failure  - stable on 2L NC, wean supplemental O2 as tolerated  - continue to monitor SpO2 with goal >94%    ====================CARDIOVASCULAR==================  Acute on chronic HFrEF exacerabation   - trend lactate  - bumex 2 mg PO QD for diuresis  - hydral 25 BID and isordil 20 TID for afterload reduction   - was on digoxin, c/f possible digoxin toxicity. Dig Level 1.2. Hold off on digoxin at this time.   - Pending further recs from HF team   - strict Is/Os, daily weights  - HF holding off on launching VAD/transplant eval at this time given cognitive issues    VT  - consider amio vs digoxin  - f/u EP reccs    AFib  - rates in 110s  - consider restarting amio vs dig   - c/w heparin gtt for full systemic AC    LV thrombus  - c/w heparin gtt    Multivessel CAD  - c/w ASA, Lipitor   - metoprolol 25 qd    ===================HEMATOLOGIC/ONC ===================  H/H & plts stable  - Monitor H/H and plts  - DVT PPX: heparin gtt    ===================== RENAL =========================  LAURA  - monitor Cr  - Continue monitoring urine output, lytes, SCr/ BUN  - replete lytes prn with goal K >4 and Mg >2    BPH  - c/w flomax .4    ==================== GASTROINTESTINAL===================  DASH diet  Miralax/Senna PRN   PPI     =======================    ENDOCRINE  =====================  DM  - A1c on admission 8.4%  - ISS  - Lantus 30  - premeal 12  - monitor FS    ========================INFECTIOUS DISEASE================  ?PNA  - sputum cx sent w/ GNR, f/u speciation  - c/w Zosyn x48 hrs until speciation  - afebrile, no leukocytosis  - monitor and trend WBC and temperature curve  58M PMHx of CVA with mild Left sided deficits, HTN, DM2, vertigo, HLD, Mobitz II s/p Medtronic dual chamber ICD (12/21/22) initially presented to OSH for SOB c/f ADHF vs. PNA, later found to have NSTEMI transferred to [facility] for LHC evaluation. s/p LHC on 10/16 w/ 3v disease, RHC on 10/19 for hemodynamic assessment, cMRI w/ limited viability in LAD territory and TTE 10/28 w/ EF<20% and apical thrombus. Admitted to CICU x2 for cardiogenic shock and x1 for VT.    Plan:  ====================== NEUROLOGY=====================  A&Ox3  - continue to monitor mental status as per protocol    L parietal and R PICA CVA  - L Parietal and R PICA infarct with acute encephalopathy secondary to cardio embolic stroke  - MR head 10/30 w/ R PICA infarct and L parietal infarct, likely due to embolic shower, per neuro. Very mild hemorrhagic transformation in R PICA distribution   - MR neck showing occluded R-sided intracranial vertebral artery of unknown timeframe and mild focal stenosis of the R supraclinoid intracranial internal carotid artery secondary to atherosclerosis  - neurology following  - neuro checks    ==================== RESPIRATORY======================  Acute hypoxic respiratory failure  - stable on 2L NC, wean supplemental O2 as tolerated  - continue to monitor SpO2 with goal >94%    ====================CARDIOVASCULAR==================  Acute on chronic HFrEF exacerabation   - trend lactate  - bumex 2 mg PO QD for diuresis  - hydral 50 BID and isordil 30 TID for afterload reduction   - was on digoxin, c/f possible digoxin toxicity. Dig Level 1.2. Hold off on digoxin at this time.   - Pending further recs from HF team   - strict Is/Os, daily weights  - Start Bumex 2 IVP BID for diuresis  - HF holding off on launching VAD/transplant eval at this time given cognitive issues    VT  - consider amio vs digoxin  - f/u EP reccs    AFib  - rates in 110s  - consider restarting amio vs dig   - c/w heparin gtt for full systemic AC    LV thrombus  - c/w heparin gtt  - Start heparin to coumadin bridge    Multivessel CAD  - c/w ASA, Lipitor   - metoprolol 25 qd    ===================HEMATOLOGIC/ONC ===================  H/H & plts stable  - Monitor H/H and plts  - DVT PPX: heparin gtt     ===================== RENAL =========================  LAURA  - monitor Cr  - Continue monitoring urine output, lytes, SCr/ BUN  - replete lytes prn with goal K >4 and Mg >2    BPH  - c/w flomax .4    ==================== GASTROINTESTINAL===================  DASH diet  Miralax/Senna PRN   PPI     =======================    ENDOCRINE  =====================  DM  - A1c on admission 8.4%  - ISS  - Lantus 30  - premeal 12  - monitor FS    ========================INFECTIOUS DISEASE================  ?PNA  - sputum cx sent w/ GNR, f/u speciation  - c/w Zosyn x48 hrs until speciation  - D/c Zosyn no growth on sputum cultures  - afebrile, no leukocytosis  - monitor and trend WBC and temperature curve  58M PMHx of CVA with mild Left sided deficits, HTN, DM2, vertigo, HLD, Mobitz II s/p Medtronic dual chamber ICD (12/21/22) initially presented to OSH for SOB c/f ADHF vs. PNA, later found to have NSTEMI transferred to [facility] for LHC evaluation. s/p LHC on 10/16 w/ 3v disease, RHC on 10/19 for hemodynamic assessment, cMRI w/ limited viability in LAD territory and TTE 10/28 w/ EF<20% and apical thrombus. Admitted to CICU x2 for cardiogenic shock and x1 for VT.    Plan:  ====================== NEUROLOGY=====================  A&Ox3  - continue to monitor mental status as per protocol    L parietal and R PICA CVA  - L Parietal and R PICA infarct with acute encephalopathy secondary to cardio embolic stroke  - MR head 10/30 w/ R PICA infarct and L parietal infarct, likely due to embolic shower, per neuro. Very mild hemorrhagic transformation in R PICA distribution   - MR neck showing occluded R-sided intracranial vertebral artery of unknown timeframe and mild focal stenosis of the R supraclinoid intracranial internal carotid artery secondary to atherosclerosis  - neurology following  - neuro checks  - Repeat CTH in the end of the week for possible PCI staging    ==================== RESPIRATORY======================  Acute hypoxic respiratory failure  - stable on 2L NC, wean supplemental O2 as tolerated  - continue to monitor SpO2 with goal >94%    ====================CARDIOVASCULAR==================  Acute on chronic HFrEF exacerabation   - trend lactate  - bumex 2 mg PO QD for diuresis  - hydral 50 BID and isordil 30 TID for afterload reduction   - was on digoxin, c/f possible digoxin toxicity. Dig Level 1.2. Hold off on digoxin at this time.   - Pending further recs from HF team   - strict Is/Os, daily weights  - Start Bumex 2 IVP BID for diuresis  - HF holding off on launching VAD/transplant eval at this time given cognitive issues    VT  - consider amio vs digoxin  - f/u EP reccs    AFib  - rates in 110s  - consider restarting amio vs dig   - c/w heparin gtt for full systemic AC    LV thrombus  - c/w heparin gtt  - Start heparin to coumadin bridge    Multivessel CAD  - c/w ASA, Lipitor   - metoprolol 25 qd  - Possible PCI planning    ===================HEMATOLOGIC/ONC ===================  H/H & plts stable  - Monitor H/H and plts  - DVT PPX: heparin gtt     ===================== RENAL =========================  LAURA  - monitor Cr  - Continue monitoring urine output, lytes, SCr/ BUN  - replete lytes prn with goal K >4 and Mg >2    BPH  - c/w flomax .4    ==================== GASTROINTESTINAL===================  DASH diet  Miralax/Senna PRN   PPI     =======================    ENDOCRINE  =====================  DM  - A1c on admission 8.4%  - ISS  - Lantus 30  - premeal 12  - monitor FS    ========================INFECTIOUS DISEASE================  ?PNA  - sputum cx sent w/ GNR, f/u speciation  - c/w Zosyn x48 hrs until speciation  - D/c Zosyn no growth on sputum cultures  - afebrile, no leukocytosis  - monitor and trend WBC and temperature curve

## 2023-11-08 NOTE — PROGRESS NOTE ADULT - SUBJECTIVE AND OBJECTIVE BOX
[facility] DIVISION OF PULMONARY, CRITICAL CARE and SLEEP MEDICINE  PULMONARY PROGRESS NOTE  OFFICE NUMBER: [phone]    PATIENT INFORMATION:  NAME: BRANDEN MARRUFO:  MRN: [id number]    CHIEF COMPLAINT: Patient is a 58y old  Male who presents with a chief complaint of NSTEMI (08 Nov 2023 10:21)      [x] INITIAL CONSULT, H&P, FAMILY HISTORY and PAST MEDICAL AND SURGICAL HISTORY REVIEWED    OVERNIGHT EVENTS or CHANGES TO HPI:   - Overnight events reviewed  - Moved to CICU after having episode of VT  - Diuretics remain off but per CICU team plan to resume today    ========================REVIEW OF SYSTEMS========================  CONSTITUTIONAL: No acute complaints  CARDIOVASCULAR: Denies chest pain  PULMONARY: Denies cough or shortness of breath  [x] REMAINING REVIEW OF SYSTEMS NEGATIVE  [] UNABLE TO OBTAIN REVIEW OF SYSTEMS DUE TO _______________    ========================MEDICATIONS=============================  MEDICATIONS  (STANDING):  aspirin enteric coated 81 milliGRAM(s) Oral daily  atorvastatin 80 milliGRAM(s) Oral at bedtime  buMETAnide Injectable 2 milliGRAM(s) IV Push two times a day  chlorhexidine 2% Cloths 1 Application(s) Topical <User Schedule>  heparin  Infusion 1100 Unit(s)/Hr (8 mL/Hr) IV Continuous <Continuous>  hydrALAZINE 50 milliGRAM(s) Oral every 8 hours  insulin glargine Injectable (LANTUS) 30 Unit(s) SubCutaneous at bedtime  insulin lispro (ADMELOG) corrective regimen sliding scale   SubCutaneous three times a day before meals  insulin lispro (ADMELOG) corrective regimen sliding scale   SubCutaneous at bedtime  insulin lispro Injectable (ADMELOG) 12 Unit(s) SubCutaneous three times a day before meals  isosorbide   dinitrate Tablet (ISORDIL) 30 milliGRAM(s) Oral three times a day  metoprolol succinate ER 25 milliGRAM(s) Oral daily  pantoprazole    Tablet 40 milliGRAM(s) Oral before breakfast  senna 2 Tablet(s) Oral at bedtime  tamsulosin 0.4 milliGRAM(s) Oral at bedtime  warfarin 5 milliGRAM(s) Oral once      MEDICATIONS  (PRN):  polyethylene glycol 3350 17 Gram(s) Oral daily PRN Constipation      ========================PHYSICAL EXAM============================    VITALS: ICU Vital Signs Last 24 Hrs  T(C): 36.6 (08 Nov 2023 11:00), Max: 37.2 (07 Nov 2023 12:25)  T(F): 97.9 (08 Nov 2023 11:00), Max: 98.9 (07 Nov 2023 12:25)  HR: 109 (08 Nov 2023 11:00) (100 - 115)  BP: 114/80 (08 Nov 2023 11:00) (102/66 - 133/95)  BP(mean): 92 (08 Nov 2023 11:00) (89 - 110)  RR: 16 (08 Nov 2023 11:00) (16 - 29)  SpO2: 95% (08 Nov 2023 11:00) (91% - 99%)    O2 Parameters below as of 08 Nov 2023 11:00  Patient On (Oxygen Delivery Method): nasal cannula  O2 Flow (L/min): 4      INTAKE and OUTPUT: I&O's Summary    07 Nov 2023 07:01  -  08 Nov 2023 07:00  --------------------------------------------------------  IN: 510 mL / OUT: 550 mL / NET: -40 mL    08 Nov 2023 07:01  -  08 Nov 2023 11:21  --------------------------------------------------------  IN: 160 mL / OUT: 0 mL / NET: 160 mL      VENTILATOR SETTINGS: N/A    GENERAL: awake, alert, oriented, interactive but at times confused  EYES: anicteric, EOMI  EAR/NOSE/MOUTH/THROAT: NCAT, MMM, nares clear, trachea midline, no thrush  NECK: supple, HJR  CARDIOVASCULAR: RRR, S1S2, systolic murmur   RESPIRATORY: decreased BS at bases, no wheeze, no accessory muscle use  ABDOMEN: soft, mild distension, NT, +BS  EXTREMITIES: no clubbing or cyanosis   SKIN: warm and dry  MUSCULOSKELETAL: strength intact  PSYCHIATRIC: calm     ========================LABORATORY RESULTS AND IMAGING=============                        11.2   7.31  )-----------( 266      ( 08 Nov 2023 03:47 )             37.0                                                    11-08    134<L>  |  96  |  46<H>  ----------------------------<  241<H>  4.6   |  22  |  2.19<H>    Ca    9.2      08 Nov 2023 03:47  Phos  5.6     11-08  Mg     2.3     11-08    TPro  6.9  /  Alb  3.6  /  TBili  0.4  /  DBili  x   /  AST  39  /  ALT  49<H>  /  AlkPhos  114  11-08      Creatinine Trend: 2.19<--, 2.21<--, 2.24<--, 2.16<--, 2.26<--, 2.07<--    CT CHEST:     [] RADIOLOGY REVIEWED AND INTERPRETED BY ME      THANK YOU FOR ALLOWING US TO PARTICIPATE IN THE CARE OF THIS PATIENT

## 2023-11-08 NOTE — PROGRESS NOTE ADULT - ATTENDING COMMENTS
Patient is well known to this service  He has been admitted to the CICU two different times for cardiogenic shock during this admission  He has an ischemic cardiomyopathy and is not a revascularization candidate nor a candidate for advanced therapies  He is now re-admitted, again, s/p a rapid response due to monomorphic VT  A+Ox3, s/p CVA  Hemodynamics acceptable, no pressors or inotropes, electrically quiescent since transfer - continue Toprol  Consult EP, may need anti-arrhythmic  Lactate was elevated and has cleared; other metabolic perfusion markers are not consistent with cardiogenic shock  TTE with severely reduced LVEF - continue Bidil  O2 sats mid 90s on nasal cannula - wean to room air  DASH/diabetic diet  Cr elevated, appears around baseline, likely CKD; overloaded on exam, will diurese with IV Lasix  H/H low but acceptable on Heparin drip for LV thrombus  Afebrile, on Zosyn for concern for pneumonia - if sputum cultures finalize no growth will stop  Sugars controlled on Lantus  No central access Patient is well known to this service  He has been admitted to the CICU two different times for cardiogenic shock during this admission  He has an ischemic cardiomyopathy and is not a revascularization candidate nor a candidate for advanced therapies  He is now re-admitted, again, s/p a rapid response due to monomorphic VT  A+Ox3 but with poor insight, s/p CVA  Hemodynamics acceptable, no pressors or inotropes, electrically quiescent since transfer - continue Toprol  Consult EP, may need anti-arrhythmic  Lactate was elevated and has cleared; other metabolic perfusion markers are not consistent with cardiogenic shock  TTE with severely reduced LVEF - continue Bidil  O2 sats mid 90s on nasal cannula - wean to room air  DASH/diabetic diet  Cr elevated, appears around baseline, likely CKD; overloaded on exam, will diurese with IV Lasix  H/H low but acceptable on Heparin drip for LV thrombus  Afebrile, on Zosyn for concern for pneumonia - if sputum cultures finalize no growth will stop  Hypoglycemic on Lantus - will decrease  No central access

## 2023-11-08 NOTE — PROGRESS NOTE ADULT - SUBJECTIVE AND OBJECTIVE BOX
CARDIOLOGY CONSULT PROGRESS NOTE  BRANDEN MARRUFO  MRN-[id number]    INTERVAL EVENTS:  - Last seen by EP 10/29-10/31 for tachycardia, deemed to be sinus tach  - Patient downgraded from CICU , remained on Digoxin, Metoprolol Tartrate 12.5 q6  - Overnight  patient with multiple episodes of NSVT and one episode of sustained VT lasting 2-3 minutes, which self terminated by the time cardiology was called. No medications given, patient asymptomatic and HDS during episodes  - Fingerstick 46, imprved with D50. K, Mg wnl, Dig level 1.2  - Transferred to the CICU for monitoring of VT  - Patient denies CP, SOB, palps, states he is "cold"    ROS:  Negative, except as above.    MEDICATIONS  (STANDING):  aspirin enteric coated 81 milliGRAM(s) Oral daily  atorvastatin 80 milliGRAM(s) Oral at bedtime  chlorhexidine 2% Cloths 1 Application(s) Topical <User Schedule>  heparin  Infusion 1100 Unit(s)/Hr (10 mL/Hr) IV Continuous <Continuous>  hydrALAZINE 25 milliGRAM(s) Oral two times a day  insulin glargine Injectable (LANTUS) 30 Unit(s) SubCutaneous at bedtime  insulin lispro (ADMELOG) corrective regimen sliding scale   SubCutaneous three times a day before meals  insulin lispro (ADMELOG) corrective regimen sliding scale   SubCutaneous at bedtime  insulin lispro Injectable (ADMELOG) 12 Unit(s) SubCutaneous three times a day before meals  isosorbide   dinitrate Tablet (ISORDIL) 20 milliGRAM(s) Oral three times a day  metoprolol succinate ER 25 milliGRAM(s) Oral daily  pantoprazole    Tablet 40 milliGRAM(s) Oral before breakfast  piperacillin/tazobactam IVPB.. 3.375 Gram(s) IV Intermittent every 8 hours  senna 2 Tablet(s) Oral at bedtime  tamsulosin 0.4 milliGRAM(s) Oral at bedtime    MEDICATIONS  (PRN):  polyethylene glycol 3350 17 Gram(s) Oral daily PRN Constipation    Allergies    No Known Allergies    Intolerances      P/E:  Vital Signs Last 24 Hrs  T(C): 36.8 (2023 03:00), Max: 37.2 (2023 12:25)  T(F): 98.2 (2023 03:00), Max: 98.9 (2023 12:25)  HR: 114 (2023 04:00) (100 - 114)  BP: 133/95 (2023 04:00) (102/66 - 133/95)  BP(mean): 110 (2023 04:00) (104 - 110)  RR: 20 (:) (18 - 20)  SpO2: 93% (:) (93% - 99%)    Parameters below as of 2023 03:00  Patient On (Oxygen Delivery Method): nasal cannula  O2 Flow (L/min): 2    Daily     Daily Weight in k.7 (2023 09:08)  I&O's Detail    2023 07:01  -  2023 07:00  --------------------------------------------------------  IN:    Oral Fluid: 920 mL  Total IN: 920 mL    OUT:    Voided (mL): 750 mL  Total OUT: 750 mL    Total NET: 170 mL      2023 07:01  -  2023 04:53  --------------------------------------------------------  IN:    Heparin: 11 mL    Oral Fluid: 480 mL  Total IN: 491 mL    OUT:    Voided (mL): 250 mL  Total OUT: 250 mL    Total NET: 241 mL        I&O's Summary    2023 07:01  -  2023 07:00  --------------------------------------------------------  IN: 920 mL / OUT: 750 mL / NET: 170 mL    2023 07:01  -  2023 04:53  --------------------------------------------------------  IN: 491 mL / OUT: 250 mL / NET: 241 mL      - gen: laying in hospital bed, NAD  - HEENT: MMM, NCAT  - neck: no JVD, supple  - heart: tachycardic, regular, nml S1/S2, no RMG  - lungs: CTABL, nml WOB  - abd: soft, NTND, NABS  - ext: WWP, PPP, no JOE    RELEVANT RECENT LABS/IMAGING/STUDIES:                        11.2   731  )-----------( 266      ( 2023 03:47 )             37.0     11-    134<L>  |  96  |  46<H>  ----------------------------<  241<H>  4.6   |  22  |  2.19<H>    Ca    9.2      2023 03:47  Phos  5.6     11-  Mg     2.3     11-08    TPro  6.9  /  Alb  3.6  /  TBili  0.4  /  DBili  x   /  AST  39  /  ALT  49<H>  /  AlkPhos  114  11-08    LIVER FUNCTIONS - ( 2023 03:47 )  Alb: 3.6 g/dL / Pro: 6.9 g/dL / ALK PHOS: 114 U/L / ALT: 49 U/L / AST: 39 U/L / GGT: x           PT/INR - ( 2023 03:47 )   PT: 57.5 sec;   INR: 5.49 ratio         PTT - ( 2023 03:47 )  PTT:>200.0 sec  --------------------------------------    CARDIAC MARKERS ( 2023 23:17 )  x     / x     / 49 U/L / x     / 3.4 ng/mL      --------------------------------------    Culture - Blood (collected 2023 19:20)  Source: .Blood Blood-Peripheral  Preliminary Report (2023 01:02):    No growth at 24 hours    Culture - Blood (collected 2023 19:18)  Source: .Blood Blood-Catheter  Preliminary Report (2023 01:02):    No growth at 24 hours    Culture - Sputum (collected 2023 19:10)  Source: .Sputum Sputum  Gram Stain (2023 02:27):    No polymorphonuclear leukocytes per low power field    Rare Squamous epithelial cells per low power field    Moderate Gram Negative Rods per oil power field  Preliminary Report (2023 16:23):    Normal Respiratory Tamela present  ---------------------------------------  cMRI 10/18  FINDINGS:    The artifact caused by the left biventricular ICD causes significant   artifact and degrades the quality of the images.    The left ventricle is qualitatively dilated with decreased systolic   function.  The right ventricle is qualitatively normal in size with normal systolic   function.  The left atrium is dilated. There is qualitatively mitral regurgitation.  Small pericardial effusion.    REGIONAL FUNCTION:    Akinesia of the anterior and septal segments at the mid-level. The other   segments are hypokinetic.    T2 SEQUENCE:    No abnormal T2 signal to demonstrate myocardial inflammation or edema.    PERFUSION:    Subendocardial perfusion defects within the septum and anterior wall.    LATE GADOLINIUM ENHANCEMENT (LGE):    On delayed contrast enhanced images, there is greater than 50% thickness   subendocardial LGE within the mid anteroseptum, anterior and   anterolateral segments and apical segments.    QUANTITATIVE ANALYSIS:    BSA: 1.9 m2    LV MEASUREMENTS:    LVEF: 13% (normal: male = 56-78%; female = 56-78%)  LVEDV: 225 ml (normal: male =  ml; female =  ml)  LVESV: 197 ml (normal: male = 19-72 ml; female = 13-51 ml)  SV: 28 ml (normal: male =  ml; female = 33-97)  EDV/BSA: 118 ml/m2 (normal: male= 47-92 ml/m2; female= 41-81 ml/m2)      ADDITIONAL FINDINGS:    Bilateral pleural effusions. Please note this examination is focused on   the heart.    IMPRESSION:    Limited exam secondary to artifact caused by the ICD.    There is greater than 50% thickness subendocardial LGE within the mid   anteroseptum, anterior and anterolateral segments and apical segments.    --- End of Report ---

## 2023-11-08 NOTE — PROGRESS NOTE ADULT - SUBJECTIVE AND OBJECTIVE BOX
Neurology        S: patient seen and examined. MRI confirms embolic infarcts ; RRT overnight now in CCU           Medications: MEDICATIONS  (STANDING):  aspirin enteric coated 81 milliGRAM(s) Oral daily  atorvastatin 80 milliGRAM(s) Oral at bedtime  buMETAnide Injectable 2 milliGRAM(s) IV Push two times a day  chlorhexidine 2% Cloths 1 Application(s) Topical <User Schedule>  heparin  Infusion 1100 Unit(s)/Hr (8 mL/Hr) IV Continuous <Continuous>  hydrALAZINE 50 milliGRAM(s) Oral every 8 hours  insulin glargine Injectable (LANTUS) 30 Unit(s) SubCutaneous at bedtime  insulin lispro (ADMELOG) corrective regimen sliding scale   SubCutaneous three times a day before meals  insulin lispro (ADMELOG) corrective regimen sliding scale   SubCutaneous at bedtime  insulin lispro Injectable (ADMELOG) 12 Unit(s) SubCutaneous three times a day before meals  isosorbide   dinitrate Tablet (ISORDIL) 30 milliGRAM(s) Oral three times a day  metoprolol succinate ER 25 milliGRAM(s) Oral daily  pantoprazole    Tablet 40 milliGRAM(s) Oral before breakfast  senna 2 Tablet(s) Oral at bedtime  tamsulosin 0.4 milliGRAM(s) Oral at bedtime  warfarin 5 milliGRAM(s) Oral once    MEDICATIONS  (PRN):  polyethylene glycol 3350 17 Gram(s) Oral daily PRN Constipation       Vitals:  Vital Signs Last 24 Hrs  T(C): 36.6 (08 Nov 2023 07:00), Max: 37.2 (07 Nov 2023 12:25)  T(F): 97.8 (08 Nov 2023 07:00), Max: 98.9 (07 Nov 2023 12:25)  HR: 112 (08 Nov 2023 09:00) (100 - 115)  BP: 124/82 (08 Nov 2023 09:00) (102/66 - 133/95)  BP(mean): 98 (08 Nov 2023 09:00) (94 - 110)  RR: 24 (08 Nov 2023 09:00) (18 - 28)  SpO2: 91% (08 Nov 2023 09:00) (91% - 99%)    Parameters below as of 08 Nov 2023 09:00  Patient On (Oxygen Delivery Method): nasal cannula  O2 Flow (L/min): 2            General Exam:   General Appearance: Appropriately dressed and in no acute distress       Head: Normocephalic, atraumatic and no dysmorphic features  Ear, Nose, and Throat: Moist mucous membranes  CVS: S1S2+  Resp: No SOB, no wheeze or rhonchi  GI: soft NT/ND  Extremities: No edema or cyanosis  Skin: No bruises or rashes     Neurological Exam:  Mental Status: Awake, alert and oriented x 2.  Able to follow simple verbal commands. Able to name and repeat. fluent speech. No obvious aphasia or dysarthria noted. poor insight. not understanding why he is in hospital   Cranial Nerves: PERRL, EOMI, VFFC, sensation V1-V3 intact,  no obvious facial asymmetry, equal elevation of palate, scm/trap 5/5, tongue is midline on protrusion. no obvious papilledema on fundoscopic exam. hearing is grossly intact.   Motor: Normal bulk, tone and strength throughout. Fine finger movements were intact and symmetric. no tremors or drift noted.    Sensation: Intact to light touch and pinprick throughout. no right/left confusion. no extinction to tactile on DSS. Romberg was negative.   Reflexes: 1+ throughout at biceps, brachioradialis, triceps, patellars and ankles bilaterally and equal. No clonus. R toe and L toe were both downgoing.  Coordination: No dysmetria on FNF or HKS  Gait:   no limitations in gait.     Data/Labs/Imaging which I personally reviewed.         LABS:                          11.2   7.31  )-----------( 266      ( 08 Nov 2023 03:47 )             37.0     11-08    134<L>  |  96  |  46<H>  ----------------------------<  241<H>  4.6   |  22  |  2.19<H>    Ca    9.2      08 Nov 2023 03:47  Phos  5.6     11-08  Mg     2.3     11-08    TPro  6.9  /  Alb  3.6  /  TBili  0.4  /  DBili  x   /  AST  39  /  ALT  49<H>  /  AlkPhos  114  11-08    LIVER FUNCTIONS - ( 08 Nov 2023 03:47 )  Alb: 3.6 g/dL / Pro: 6.9 g/dL / ALK PHOS: 114 U/L / ALT: 49 U/L / AST: 39 U/L / GGT: x           PT/INR - ( 08 Nov 2023 05:00 )   PT: 19.8 sec;   INR: 1.83 ratio         PTT - ( 08 Nov 2023 05:00 )  PTT:129.6 sec  Urinalysis Basic - ( 08 Nov 2023 03:47 )    Color: x / Appearance: x / SG: x / pH: x  Gluc: 241 mg/dL / Ketone: x  / Bili: x / Urobili: x   Blood: x / Protein: x / Nitrite: x   Leuk Esterase: x / RBC: x / WBC x   Sq Epi: x / Non Sq Epi: x / Bacteria: x      x           < from: CT Head No Cont (10.27.23 @ 18:04) >    ACC: [id number] EXAM:  CT BRAIN   ORDERED BY: BRETT HOPSON     PROCEDURE DATE:  10/27/2023          INTERPRETATION:  Clinical Indication: CVA    5mm axial sections of the brain were obtained from base to vertex,   without the intravenous administration of contrast material. Coronal and   sagittal computer generated reconstructed views are available.    No prior brain imaging is available for comparison.          The ventricles and sulci are prominent consistent with mild atrophy.   Small vesselwhite matter ischemic changes are noted. There is a infarct   in the right medial cerebellum of undetermined age which could be acute   to subacute based on its degree of lucency. There is no significant   hemorrhage. Bone window examination is unremarkable. There is been   previous right-sided lens replacement surgery.      IMPRESSION: Atrophy and small vessel white matter ischemic changes. Right   medial cerebellar infarct may be acute versus subacute. No hemorrhage.      --- End of Report ---    < from: MR Angio Head No Cont (10.30.23 @ 20:58) >    ACC: [id number] EXAM:  MR ANGIO BRAIN   ORDERED BY: NATALIE CARRANZA     ACC: [id number] EXAM:  MR ANGIO NECK   ORDERED BY: NATALIE CARRANZA     ACC: [id number] EXAM:  MR BRAIN   ORDERED BY: NAVNEET CORONADO     PROCEDURE DATE:  10/30/2023          INTERPRETATION:  .    CLINICAL INFORMATION: Stroke.    TECHNIQUE: Multiplanar multi sequential MRI examination of the brain was   performed without the administration of IV gadolinium. MRA images through   the neck and [place] were obtained usinga combination of 2-D   and 3-D time-of-flight acquisition. The data was then reformatted into a   volumetric data set and reviewed as rotational MIP images.    COMPARISON: Most recent prior CT examination of the head from 10/27/2023.   No prior MRI studies are available for comparison.    FINDINGS:    MRI Brain: A wedge-shaped area of diffusion restriction is seen within   the right PICA territory. Associated T2/FLAIR hyperintense signal is also   seen, compatible with cytotoxic edema. Mild hemorrhagic transformation is   seen within the infarct bed manifested by high signal on T1-weighted   imaging as well as susceptibility artifact on the SWI sequence. Mild mass   effect is seen without effacement of the fourth ventricle or shift of the   posterior fossa midline structures.    This is superimposed upon encephalomalacia and gliosis involving the   right cerebellar hemisphere.    An additional vague area of diffusion reduction is seen within the left   parietal periventricular white matter without associated T2/FLAIR   hyperintense signal, compatible with cytotoxic edema. No gross   hemorrhagic transformation is noted in this location.    Scattered foci of susceptibility artifact are seen within the bilateral   basal ganglia, compatible with areas of chronic microhemorrhage.    Multiple additional patchy confluent nonspecific T2/FLAIR hyperintense   signal changes are noted throughout the bihemispheric white matter   without associated mass effect or restricted diffusion.    Ventricular size and configuration is unremarkable. No abnormal   extra-axial fluid collections are seen. Flow-voids are noted throughout   the major intracranial vessels, on the T2 weighted images, consistent   with their patency. The sellar location appears unremarkable. There is an   unchanged appearing small retrocerebellar arachnoid cyst versus cisterna   magna.    A polyp versus retention cyst is seen within the right maxillary sinus.   Additional minor scattered mucosal thickening seen throughout the ethmoid   labyrinth. The tympanomastoid cavities are clear. The left orbit appears   within normal limits. There is evidence of right-sided cataract removal.    There is an indeterminate mixed signal ovoid shaped soft tissue focus   involving the left superior pinna measuring 1.4 x 0.9 cm which appears   unchanged.    MRA Neck: Examination is motion limited.    There is a standard anatomic variation to the aortic arch. The origins of   the great vessels appear grossly unremarkable.    The bilateral common carotid arteries, carotid bifurcations and cervical   internal carotid arteries appear patent. The origin of the left vertebral   artery is normal. The left vertebral artery is patent.    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    MRA [place]: Atherosclerosis affects the bilateral carotid   siphons with mild area of focal stenosis involving the right   clinoid/supraclinoid junction. There is mild hypoplasia of the right A1   segment. Otherwise, the bilateral intracranial internal carotid,   anterior, and middle cerebral arteries appear unremarkable.    The anterior and bilateral posterior communicating arteries are seen.    The right V4 segment does not demonstrate flow relatedsignal. The left   V4 segment appears unremarkable. The vertebrobasilar confluence appears   unremarkable. Long segment mild stenosis involving the mid basilar   artery. The basilar tip appears unremarkable as well as the bilateral   posterior cerebral arteries.    IMPRESSION:    MRI BRAIN:  1. Evolving acute/subacute right-sided PICA distribution infarction with   associated cytotoxic edema and very mild hemorrhagic transformation.  2. Additional wedge-shaped area of acute/subacute ischemia within the   left parietal periatrial white matter with associated cytotoxic edema.  3. Indeterminate soft tissue lesion involving the left superior pinna   measuring 1.4 x 0.9 cm. Neoplasm cannot be excluded. Recommend   correlation with direct physical examination and visualization.    MRA NECK:  1. Extremely motion limited study.  2. 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 Recommend further evaluation with a CT angiogram study   of the neck.    MRA HEAD:  1. 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 This can be further evaluated with a CT angiogram study of the   head.  2. Mild focal stenosis of the right supraclinoid intracranial internal   carotid artery secondary to atherosclerosis.  3. Otherwise, no large vessel occlusion or major stenosis.    --- End of Report ---      < from: CT Brain Stroke Protocol (11.06.23 @ 14:11) >    ACC: [id number] EXAM:  CT BRAIN STROKE PROTOCOL   ORDERED BY:  KAMILA TAVAREZ     ACC: [id number] EXAM:  CT ANGIO NECK STROKE PROTCL IC   ORDERED BY:  KAMILA TAVAREZ     ACC: [id number] EXAM:  CT ANGIO BRAIN STROKE PROTC IC   ORDERED BY:  KAMILA TAVAREZ     PROCEDURE DATE:  11/06/2023          INTERPRETATION:  Clinical Indication: Code stroke for dizziness, prior   infarct one week ago    5mm axial sections of the brain were obtained from base to vertex,   without the intravenous administration of contrast material. Coronal and   sagittal computer generated reconstructed views are available.    Comparison is made with the prior CT of 10/27/2023 and the MRI 10/30/2023.    There is moderate atrophy for the patient's age with ventricular and   sulcal prominence. Small vessel white matter ischemic changes are noted.   There is an old right medial occipital infarct. No acute hemorrhage is   identified.        After the intravenous power injection of 70 cc of Omnipaque 300 using a   bolus amol timing run serial thin sections were obtained through the   neck from the thoracic inlet through the intracranial circulation   centered at the [id number] on a multislice CT scanner reformatted   with coronal and sagittal 2 D-MIP projections, including 3 D  reconstructions using a separate 3D [employer] software workstation.A total   of  70 cc of Omnipaque were intravenously injected.  30 cc were discarded.    The origins of the common carotid arteries are normal. The origin of the   left dominant left vertebral artery demonstrates moderate stenosis   nondominant small right vertebral artery appears hypoplastic on a   congenital basis and is occluded at the V3 and V4 portion.    There is severe stenosis of the V4 segment of the left vertebral artery   just proximal to the VV junction. The basilar artery is normal. The   posterior cerebral and superior cerebellar arteries are normal.    Evaluation of the carotid arteries demonstrate normal appearance to   distal cervical, petrous, cavernous and supraclinoid internal carotid   arteries. The anterior cerebral arteries and anterior communicating   artery are visualized, the right A1 segment is hypoplastic on a   congenital basis. The middle cerebral arteries are visualized, the left   M1 segment is moderately narrowed but patent. The middle cerebral artery   vessels in the sylvian fissure unremarkable.      The normal intracranial venous circulation is identified. The right   transverse sinus is dominant. The superior sagittal sinus, internal   cerebral veins, vein of Jeremías, straight sinus, transverse sinuses,   sigmoid sinuses and internal jugular veins are normal.  Cortical veins are normal.      There are large bilateral pleural effusions and consolidation in the left   upper lobe whichis similar to a prior exam of 11/5/2023. There is a   left-sided permanent pacemaker.    IMPRESSION: Atrophy for the patient's age. Old right cerebellar infarct.   No hemorrhage. No change since 10/30/2023.    Severe stenosis of the dominant left vertebral artery at the V4 segment   and moderate stenosis at the origin. 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 Moderate left M1 stenosis.    --- End of Report ---            ANDREW TONEY MD; Attending Radiologist  This document has been electronically signed. Nov 6 2023  2:35PM    < end of copied text >        LAEKSHA DIAZ MD; Attending Radiologist  This document has been electronically signed. Oct 30 2023  9:20PM    < end of copied text >

## 2023-11-08 NOTE — PROGRESS NOTE ADULT - ASSESSMENT
59 yo Male pmhx CVA with mild left sided deficits, HTN, DM2, vertigo, HLD, Mobitz II s/pp [employer] dual chamber ICD (12/21/22) initially presented to OSH for SOB c/f ADHF vs. PNA, later found to have NSTEMI transferred to [facility] for LHC evaluation. 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 Now with runs of NSVT and one run of sustained VT, likely scar mediated in the setting of unrevascularized 3VD. Would also consider Digoxin toxicity leading to VT, toxicity can occur even with normal level and patients CrCL slowly worsening over the past week while continued on Dig.     Recommendations:  - Transferred to CICU for monitoring  - Hold further Digoxin for now  - Restart Metoprolol Tartrate 12.5 q6  - Consider Mexiletine if recurrent VT, would avoid Amio as it can potentiate effects of Digoxin  - Would discuss ICD placement 40 days post MI if within GOC of patient     Case to be formally staffed with EP attending in AM

## 2023-11-08 NOTE — PROGRESS NOTE ADULT - PROBLEM SELECTOR PLAN 1
ICM  - Last TTE: EF <20% rMWA  - Cath: : 95% discrete proximal LAD disease and sequential multifocal disease with good distal target, 80-90% proximal LCx disease just prior to marginals, severe multifocal RCA. Limited viability on MRI.   - GDMT:    > BB: MTP Succ 25 QDay     > ACE/ARB/ARNI: Stopped Entresto 24-26 BID. Cont HYdral 50mg/Isordil 30mg    > MRA: Stopped spironolactone 25 QDay     > SGLT2 I: Eventual farxiga 10mg daily prior to discharge  - Inotropes: Off milrinone since 11/1  - Diuretics: Restart Bumex 2QDay  Please Stop Bumex and hold dose for 2days. Will re-evaluate for re-initiation.   - Advanced therapies: severe LV dysfunction with tachycardia and poor non-invasive hemodynamics concerning, currently holding off on launching VAD/transplant eval given cognitive issues however will continue to reassess. Of note he has good support and no clear psychosocial red flags. ABO AB.   - F/U PT recs

## 2023-11-08 NOTE — PROGRESS NOTE ADULT - ASSESSMENT
59 yo Male with prior Stroke with mild ?left sided deficits (improved), HTN, DM2, vertigo, HLD, Mobitz II s/pp Medtronic dual chamber ICD (12/21/22) initially presented to [facility] from home with complaints of dyspnea and chest pain and was admitted w/ acute hypoxic respiratory failure likely due to acute pulmonary edema due to acute HFrEF in setting of acute NSTEMI, LAURA and enterovirus infection. Pt with brief MICU stay at [facility] requiring bipap (no intubation), was treated for PNA with cefepime, and was found to have TTE done 9/26/23 showing EF 20% with severely decreased LVSF, multiple regional wall motion abnormalities, and elevated LV end diastolic pressure. Pt was transferred to [facility] for cardiac eval.   TTE EF < 20%  RHC 10/19: RA 10, RV 47/9/13, Wedge 29, PA 41/26/33, CO/CI 4.4/2.3, PA sat 57.3  EKG: sinus tachycardia, septal q-waves, left axis deviation   TTE 10/16/23: LV 5.5 cm, LVEF 20% with regional WMAs worse in the apex, LVOT VTI 8 cm, normal RV size/function, severe functional MR, small pericardial effusion, estimated RA pressure 8 mmHg   TTE 12/2022: normal LVEF   LHC: 95% discrete proximal LAD disease and sequential multifocal disease with good distal target, 80-90% proximal LCx disease just prior to marginals, severe multifocal RCA disease with good targets   A1c 8.4    CD 10/17 neg   NIHSS 1  CTH with age indeterminate R cerebellar infarct.  likely chronic   MRi brain with eovlving acute/subacute R PICA infarct with mild edema and mild hemorrhagic transformation. 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  occluded R VA.   CTH stable   RRT/code stroke on 11/6 in setting of SBP 80s/50s   CTH stable. old R cerebellar infarct. CTA with severe L VA stenosis ; R VA occlusion   11/8 RRT transferred to CCU. no neuro changes     Impression:   R cerebellar/PICA infarct as well as L parietal embolic infarct, likely cardioembolic   worsening symptoms in setting of hypotension,m now improved       - repeat CTH end of the week.  patient will be high risk from neuro standpoint for cardiac intervention however benefits at this point seem to outweigh risks.  would consider PCI staging next week if repeat CTH stable.  triple therapy will be needed    - keep SBP > 100; becomes symptomatic when BP drops   - c/o SOB ; f/u cardio and pulmo   - f/u heart failure team   - can consider routine EEG but low yield   - CTS eval for CABG given 3 vessel disease vs high risk PCI --> not a CABG candidate ; possible PCI planning but would need "triple therapy"   - c/w asa and statin therpay for secondary stroke prevention.   - no objection to heparin drip for low EF and LV thrombus  --> no objection to transition to coumadin ; hep to coumadin bridge ; coumadin per INR   - called for risk stratification for heart transplant eval,  low-mod risk from stroke standpoint and no objection   - b12, TSH, RPR for reversible causes of dementia   - telemetry  - PT/OT   - check FS, glucose control <180  - GI/DVT ppx   - Thank you for allowing me to participate in the care of this patient. Call with questions.   spoke with primary team     Abelardo Irving MD  Vascular Neurology  Office: [phone]

## 2023-11-08 NOTE — PROGRESS NOTE ADULT - SUBJECTIVE AND OBJECTIVE BOX
BRANDEN MARRUFO  MRN-[id number]  Patient is a 58y old  Male who presents with a chief complaint of NSTEMI (08 Nov 2023 13:31)    HPI:  Patient with separate chart from [facility], MRN# [id number]    59 yo Male pmhx CVA with mild left sided deficits, HTN, DM2, vertigo, HLD, Mobitz II s/pp Medtronic dual chamber ICD (12/21/22) initially presented to [facility] from home with complaints of dyspnea and chest pain and was admitted w/ acute hypoxic respiratory failure likely due to acute pulmonary edema due to acute HFrEF in setting of acute NSTEMI, LAURA and enterovirus infection. Pt with brief MICU stay at [facility] requiring bipap (no intubation), was treated for PNA with cefepime, and was found to have TTE done 9/26/23 showing EF 20% with severely decreased LVSF, multiple regional wall motion abnormalities, and elevated LV end diastolic pressure. Pt was transferred to [facility] for cardiac cath evaluation. Patient without any acute complaints, complaining of intermittent palpitations for the last 2 days. No chest pain, SOB, fevers, nausea, vomiting, abdominal pain.  (13 Oct 2023 21:05)      24 HOUR EVENTS:    REVIEW OF SYSTEMS:    CONSTITUTIONAL: No weakness, fevers or chills  EYES/ENT: No visual changes;  No vertigo or throat pain   NECK: No pain or stiffness  RESPIRATORY: No cough, wheezing, hemoptysis; No shortness of breath  CARDIOVASCULAR: No chest pain or palpitations  GASTROINTESTINAL: No abdominal or epigastric pain. No nausea, vomiting, or hematemesis; No diarrhea or constipation. No melena or hematochezia.  GENITOURINARY: No dysuria, frequency or hematuria  NEUROLOGICAL: No numbness or weakness  SKIN: No itching, rashes      ICU Vital Signs Last 24 Hrs  T(C): 36.7 (08 Nov 2023 15:00), Max: 36.8 (08 Nov 2023 03:00)  T(F): 98.1 (08 Nov 2023 15:00), Max: 98.2 (08 Nov 2023 03:00)  HR: 102 (08 Nov 2023 18:00) (100 - 115)  BP: 122/80 (08 Nov 2023 18:00) (98/61 - 133/95)  BP(mean): 94 (08 Nov 2023 18:00) (74 - 110)  ABP: --  ABP(mean): --  RR: 19 (08 Nov 2023 18:00) (16 - 36)  SpO2: 97% (08 Nov 2023 18:00) (91% - 99%)    O2 Parameters below as of 08 Nov 2023 18:00  Patient On (Oxygen Delivery Method): nasal cannula  O2 Flow (L/min): 4          CVP(mm Hg): --  CO: --  CI: --  PA: --  PA(mean): --  PA(direct): --  PCWP: --  LA: --  RA: --  SVR: --  SVRI: --  PVR: --  PVRI: --  I&O's Summary    07 Nov 2023 07:01  -  08 Nov 2023 07:00  --------------------------------------------------------  IN: 510 mL / OUT: 550 mL / NET: -40 mL    08 Nov 2023 07:01  -  08 Nov 2023 19:15  --------------------------------------------------------  IN: 396 mL / OUT: 200 mL / NET: 196 mL        CAPILLARY BLOOD GLUCOSE    CAPILLARY BLOOD GLUCOSE      POCT Blood Glucose.: 136 mg/dL (08 Nov 2023 18:17)      PHYSICAL EXAM:  GENERAL: No acute distress, well-developed  HEAD:  Atraumatic, Normocephalic  EYES: EOMI, PERRLA, conjunctiva and sclera clear  NECK: Supple, no lymphadenopathy, no JVD  CHEST/LUNG: CTAB; No wheezes, rales, or rhonchi  HEART: Regular rate and rhythm. Normal S1/S2. No murmurs, rubs, or gallops  ABDOMEN: Soft, non-tender, non-distended; normal bowel sounds, no organomegaly  EXTREMITIES:  2+ peripheral pulses b/l, No clubbing, cyanosis, or edema  NEUROLOGY: A&O x 3, no focal deficits  SKIN: No rashes or lesions    ============================I/O===========================   I&O's Detail    07 Nov 2023 07:01  -  08 Nov 2023 07:00  --------------------------------------------------------  IN:    Heparin: 30 mL    Oral Fluid: 480 mL  Total IN: 510 mL    OUT:    Voided (mL): 550 mL  Total OUT: 550 mL    Total NET: -40 mL      08 Nov 2023 07:01  -  08 Nov 2023 19:15  --------------------------------------------------------  IN:    Heparin: 96 mL    Oral Fluid: 300 mL  Total IN: 396 mL    OUT:    Voided (mL): 200 mL  Total OUT: 200 mL    Total NET: 196 mL        ============================ LABS =========================                        11.2   7.31  )-----------( 266      ( 08 Nov 2023 03:47 )             37.0     11-08    136  |  102  |  47<H>  ----------------------------<  124<H>  3.9   |  21<L>  |  2.21<H>    Ca    9.0      08 Nov 2023 17:31  Phos  5.6     11-08  Mg     2.3     11-08    TPro  6.4  /  Alb  3.4  /  TBili  0.3  /  DBili  x   /  AST  21  /  ALT  40  /  AlkPhos  101  11-08    Troponin T, High Sensitivity Result: 550 ng/L (11-07-23 @ 23:17)    CKMB Units: 3.4 ng/mL (11-07-23 @ 23:17)    Creatine Kinase, Serum: 49 U/L (11-07-23 @ 23:17)    CPK Mass Assay %: 6.9 % (11-07-23 @ 23:17)        LIVER FUNCTIONS - ( 08 Nov 2023 17:31 )  Alb: 3.4 g/dL / Pro: 6.4 g/dL / ALK PHOS: 101 U/L / ALT: 40 U/L / AST: 21 U/L / GGT: x           PT/INR - ( 08 Nov 2023 05:00 )   PT: 19.8 sec;   INR: 1.83 ratio         PTT - ( 08 Nov 2023 17:18 )  PTT:62.3 sec    Lactate, Blood: 1.5 mmol/L (11-08-23 @ 03:47)  Blood Gas Venous - Lactate: 2.5 mmol/L (11-07-23 @ 23:20)  Lactate, Blood: 0.9 mmol/L (11-07-23 @ 06:21)  Lactate, Blood: 1.0 mmol/L (11-06-23 @ 19:20)    Urinalysis Basic - ( 08 Nov 2023 17:31 )    Color: x / Appearance: x / SG: x / pH: x  Gluc: 124 mg/dL / Ketone: x  / Bili: x / Urobili: x   Blood: x / Protein: x / Nitrite: x   Leuk Esterase: x / RBC: x / WBC x   Sq Epi: x / Non Sq Epi: x / Bacteria: x      ======================Micro/Rad/Cardio=================  Telemetry: Reviewed   EKG: Reviewed  CXR: Reviewed  Culture: Reviewed   Echo:   Cath:

## 2023-11-08 NOTE — PROGRESS NOTE ADULT - ATTENDING COMMENTS
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 Had CVA at that time.  Admitted now with NSTEMI. Found to have high grade LAD stenosis and severe RCA disease.  Viability study via CMRI showed >50% LGE in LAD territory. + LV thrombus.   During this stay had a new CVA in PICA territory with mild hemorrhagic transformation as well as embolic appearing parietal stroke.   Plans for revascularization were halted due to this. He has been diuresed and started on low dose GDMT.    On exam he appears dry to me with low JVP, no peripheral edema and systemic hypotension.     *On 11/6 after my evaluation a rapid was called due to dizziness and hypotension. A CT was repeated showing occlusion of the non dominant vertebral artery and high grade stenosis of the dominant. He received contrast for this study. Symptoms were deemed to be due to hypotension.    On 11/7 transferred to CICU for sustained VT that was self limited     - Diurese with Bumex 2 mg daily   - Amiodarone per EP   - Toprol XL 25 mg   - Continue heparin gtt and ASA (INR 1.6)  - Hydralazine 25  - Continue Digoxin (Level 1.3 on 11/3)  - Raad Cr was ~1.6-1.8  - Family is eager to go home, had long discussion about current scenario - needs a final plan on revasc vs. not will discuss with BENNIE Spring MD

## 2023-11-08 NOTE — PROGRESS NOTE ADULT - PROBLEM SELECTOR PLAN 3
- Slow VT on 11/7  - EP following   - Likely more scar based than ischemic  - Cont MTP Succ 25   - F/U EP recs

## 2023-11-08 NOTE — PROVIDER CONTACT NOTE (HYPOGLYCEMIA EVENT) - NS PROVIDER CONTACT NOTE-TREATMENT-HYPO
Dextrose 50% 25 Grams IV Push
pt eating meal tray./4 oz Fruit Juice (Specify quantity, date/time)/Other (Specify)

## 2023-11-08 NOTE — CHART NOTE - NSCHARTNOTEFT_GEN_A_CORE
57 y/o M PMH CVA with mild left sided deficits, HTN, DM2, vertigo, HLD, Mobitz II s/p Medtronic dual chamber ICD (12/21/22) initially presented to OSH for SOB c/f ADHF vs. PNA, later found to have NSTEMI transferred to [facility] for LHC evaluation. 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 Now with runs of NSVT and one run of sustained VT, in the setting of unrevascularized 3VD. Would also consider Digoxin toxicity leading to VT, toxicity can occur even with normal level and patients CrCl slowly worsening over the past week while continued on Dig.     Patient seen and examined today in CICU, he is HDS on Heparin gtt for LV thrombus. Electrically quiet, maintaining SR/'s. No longer on Milrinone gtt and was started on PO GDMT, had been on Digoxin. He had 2 episodes of VT falling into the monitor zone (160's bpm) lasting 3 and 10 seconds on 11/7 PM shift. HF last saw patient 11/6, at that time LVAD/AT eval was not being launched 2/2 cognitive issues. Not a candidate for Impella given LV thrombus. He is not being revascularized 2/2 no viability on cMRI.     Recommendations: 59 y/o M PMH CVA with mild left sided deficits, HTN, DM2, vertigo, HLD, Mobitz II s/p Medtronic dual chamber ICD (12/21/22) initially presented to OSH for SOB c/f ADHF vs. PNA, later found to have NSTEMI transferred to [facility] for LHC evaluation. 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 Now with runs of NSVT and one run of sustained VT, in the setting of unrevascularized 3VD. Would also consider Digoxin toxicity leading to VT, toxicity can occur even with normal level and patients CrCl slowly worsening over the past week while continued on Dig.     Patient seen and examined today in CICU, he is HDS on Heparin gtt for LV thrombus. Electrically quiet, maintaining SR/'s. No longer on Milrinone gtt and was started on PO GDMT, had been on Digoxin. He had 2 episodes of VT falling into the monitor zone (160's bpm) lasting 3 and 10 seconds on 11/7 PM shift. HF last saw patient 11/6, at that time LVAD/AT eval was not being launched 2/2 cognitive issues. Not a candidate for Impella given LV thrombus. He is not being revascularized 2/2 no viability on cMRI.     Recommendations:  Initiate Amio load 400mg q8h to 10gm followed by 200mg qd thereafter  While on Amio, needs monitoring of LFT's and TFT's. Will need monitoring of PFT's and yearly opthalmologic eval as outpatient as well   Avoid Digoxin   Continue tele monitoring   Replete lytes for K>4 and Mg>2

## 2023-11-08 NOTE — PROGRESS NOTE ADULT - ASSESSMENT
58M PMHx of CVA with mild Left sided deficits, HTN, DM2, vertigo, HLD, Mobitz II s/p Medtronic dual chamber ICD (12/21/22) initially presented to OSH for SOB c/f ADHF vs. PNA, later found to have NSTEMI transferred to [facility] for LHC evaluation. s/p LHC on 10/16 w/ 3v disease, RHC on 10/19 for hemodynamic assessment, cMRI w/ limited viability in LAD territory and TTE 10/28 w/ EF<20% and apical thrombus. Admitted to CICU x2 for cardiogenic shock and x1 for VT.    Plan:  ====================== NEUROLOGY=====================  A&Ox3  - continue to monitor mental status as per protocol    L parietal and R PICA CVA  - L Parietal and R PICA infarct with acute encephalopathy secondary to cardio embolic stroke  - MR head 10/30 w/ R PICA infarct and L parietal infarct, likely due to embolic shower, per neuro. Very mild hemorrhagic transformation in R PICA distribution   - MR neck showing occluded R-sided intracranial vertebral artery of unknown timeframe and mild focal stenosis of the R supraclinoid intracranial internal carotid artery secondary to atherosclerosis  - neurology following  - neuro checks  - Repeat CTH in the end of the week for possible PCI staging    ==================== RESPIRATORY======================  Acute hypoxic respiratory failure  - stable on 2L NC, wean supplemental O2 as tolerated  - continue to monitor SpO2 with goal >94%    ====================CARDIOVASCULAR==================  Acute on chronic HFrEF exacerabation   - trend lactate  - bumex 2 mg PO QD for diuresis  - hydral 50 BID and isordil 30 TID for afterload reduction   - was on digoxin, c/f possible digoxin toxicity. Dig Level 1.2. Hold off on digoxin at this time.   - Pending further recs from HF team   - strict Is/Os, daily weights  - Start Bumex 2 IVP BID for diuresis  - HF holding off on launching VAD/transplant eval at this time given cognitive issues    VT  - consider amio vs digoxin  - f/u EP reccs    AFib  - rates in 110s  - consider restarting amio vs dig   - c/w heparin gtt for full systemic AC    LV thrombus  - c/w heparin gtt  - Start heparin to coumadin bridge    Multivessel CAD  - c/w ASA, Lipitor   - metoprolol 25 qd  - Possible PCI planning    ===================HEMATOLOGIC/ONC ===================  H/H & plts stable  - Monitor H/H and plts  - DVT PPX: heparin gtt     ===================== RENAL =========================  LAURA  - monitor Cr  - Continue monitoring urine output, lytes, SCr/ BUN  - replete lytes prn with goal K >4 and Mg >2    BPH  - c/w flomax .4    ==================== GASTROINTESTINAL===================  DASH diet  Miralax/Senna PRN   PPI     =======================    ENDOCRINE  =====================  DM  - A1c on admission 8.4%  - ISS  - Lantus 30  - premeal 12  - monitor FS    ========================INFECTIOUS DISEASE================  ?PNA  - sputum cx sent w/ GNR, f/u speciation  - c/w Zosyn x48 hrs until speciation  - D/c Zosyn no growth on sputum cultures  - afebrile, no leukocytosis  - monitor and trend WBC and temperature curve       Patient requires continuous monitoring with bedside rhythm monitoring, pulse ox monitoring, and intermittent blood gas analysis. Care plan discussed with ICU care team. Patient remained critical and at risk for life threatening decompensation.  Patient seen, examined and plan discussed with CCU team during rounds.     I have personally provided 35 minutes of critical care time excluding time spent on separate procedures, in addition to initial critical care time provided by the CICU Attending, Dr. Han

## 2023-11-09 DIAGNOSIS — I47.20 VENTRICULAR TACHYCARDIA, UNSPECIFIED: ICD-10-CM

## 2023-11-09 DIAGNOSIS — Z51.5 ENCOUNTER FOR PALLIATIVE CARE: ICD-10-CM

## 2023-11-09 DIAGNOSIS — Z71.89 OTHER SPECIFIED COUNSELING: ICD-10-CM

## 2023-11-09 DIAGNOSIS — R53.81 OTHER MALAISE: ICD-10-CM

## 2023-11-09 LAB
ALBUMIN SERPL ELPH-MCNC: 3.3 G/DL — SIGNIFICANT CHANGE UP (ref 3.3–5)
ALBUMIN SERPL ELPH-MCNC: 3.3 G/DL — SIGNIFICANT CHANGE UP (ref 3.3–5)
ALP SERPL-CCNC: 97 U/L — SIGNIFICANT CHANGE UP (ref 40–120)
ALP SERPL-CCNC: 97 U/L — SIGNIFICANT CHANGE UP (ref 40–120)
ALT FLD-CCNC: 38 U/L — SIGNIFICANT CHANGE UP (ref 10–45)
ALT FLD-CCNC: 38 U/L — SIGNIFICANT CHANGE UP (ref 10–45)
ANION GAP SERPL CALC-SCNC: 15 MMOL/L — SIGNIFICANT CHANGE UP (ref 5–17)
ANION GAP SERPL CALC-SCNC: 15 MMOL/L — SIGNIFICANT CHANGE UP (ref 5–17)
APTT BLD: 55.9 SEC — HIGH (ref 24.5–35.6)
APTT BLD: 55.9 SEC — HIGH (ref 24.5–35.6)
AST SERPL-CCNC: 18 U/L — SIGNIFICANT CHANGE UP (ref 10–40)
AST SERPL-CCNC: 18 U/L — SIGNIFICANT CHANGE UP (ref 10–40)
BASOPHILS # BLD AUTO: 0.01 K/UL — SIGNIFICANT CHANGE UP (ref 0–0.2)
BASOPHILS # BLD AUTO: 0.01 K/UL — SIGNIFICANT CHANGE UP (ref 0–0.2)
BASOPHILS NFR BLD AUTO: 0.1 % — SIGNIFICANT CHANGE UP (ref 0–2)
BASOPHILS NFR BLD AUTO: 0.1 % — SIGNIFICANT CHANGE UP (ref 0–2)
BILIRUB SERPL-MCNC: 0.4 MG/DL — SIGNIFICANT CHANGE UP (ref 0.2–1.2)
BILIRUB SERPL-MCNC: 0.4 MG/DL — SIGNIFICANT CHANGE UP (ref 0.2–1.2)
BUN SERPL-MCNC: 44 MG/DL — HIGH (ref 7–23)
BUN SERPL-MCNC: 44 MG/DL — HIGH (ref 7–23)
CALCIUM SERPL-MCNC: 9 MG/DL — SIGNIFICANT CHANGE UP (ref 8.4–10.5)
CALCIUM SERPL-MCNC: 9 MG/DL — SIGNIFICANT CHANGE UP (ref 8.4–10.5)
CHLORIDE SERPL-SCNC: 101 MMOL/L — SIGNIFICANT CHANGE UP (ref 96–108)
CHLORIDE SERPL-SCNC: 101 MMOL/L — SIGNIFICANT CHANGE UP (ref 96–108)
CO2 SERPL-SCNC: 20 MMOL/L — LOW (ref 22–31)
CO2 SERPL-SCNC: 20 MMOL/L — LOW (ref 22–31)
CREAT SERPL-MCNC: 2.12 MG/DL — HIGH (ref 0.5–1.3)
CREAT SERPL-MCNC: 2.12 MG/DL — HIGH (ref 0.5–1.3)
EGFR: 36 ML/MIN/1.73M2 — LOW
EGFR: 36 ML/MIN/1.73M2 — LOW
EOSINOPHIL # BLD AUTO: 0 K/UL — SIGNIFICANT CHANGE UP (ref 0–0.5)
EOSINOPHIL # BLD AUTO: 0 K/UL — SIGNIFICANT CHANGE UP (ref 0–0.5)
EOSINOPHIL NFR BLD AUTO: 0 % — SIGNIFICANT CHANGE UP (ref 0–6)
EOSINOPHIL NFR BLD AUTO: 0 % — SIGNIFICANT CHANGE UP (ref 0–6)
GLUCOSE BLDC GLUCOMTR-MCNC: 146 MG/DL — HIGH (ref 70–99)
GLUCOSE BLDC GLUCOMTR-MCNC: 146 MG/DL — HIGH (ref 70–99)
GLUCOSE BLDC GLUCOMTR-MCNC: 150 MG/DL — HIGH (ref 70–99)
GLUCOSE BLDC GLUCOMTR-MCNC: 150 MG/DL — HIGH (ref 70–99)
GLUCOSE BLDC GLUCOMTR-MCNC: 172 MG/DL — HIGH (ref 70–99)
GLUCOSE BLDC GLUCOMTR-MCNC: 172 MG/DL — HIGH (ref 70–99)
GLUCOSE BLDC GLUCOMTR-MCNC: 174 MG/DL — HIGH (ref 70–99)
GLUCOSE BLDC GLUCOMTR-MCNC: 174 MG/DL — HIGH (ref 70–99)
GLUCOSE BLDC GLUCOMTR-MCNC: 192 MG/DL — HIGH (ref 70–99)
GLUCOSE BLDC GLUCOMTR-MCNC: 192 MG/DL — HIGH (ref 70–99)
GLUCOSE BLDC GLUCOMTR-MCNC: 195 MG/DL — HIGH (ref 70–99)
GLUCOSE BLDC GLUCOMTR-MCNC: 195 MG/DL — HIGH (ref 70–99)
GLUCOSE BLDC GLUCOMTR-MCNC: 239 MG/DL — HIGH (ref 70–99)
GLUCOSE BLDC GLUCOMTR-MCNC: 239 MG/DL — HIGH (ref 70–99)
GLUCOSE SERPL-MCNC: 172 MG/DL — HIGH (ref 70–99)
GLUCOSE SERPL-MCNC: 172 MG/DL — HIGH (ref 70–99)
HCT VFR BLD CALC: 36.1 % — LOW (ref 39–50)
HCT VFR BLD CALC: 36.1 % — LOW (ref 39–50)
HGB BLD-MCNC: 10.8 G/DL — LOW (ref 13–17)
HGB BLD-MCNC: 10.8 G/DL — LOW (ref 13–17)
IMM GRANULOCYTES NFR BLD AUTO: 0.5 % — SIGNIFICANT CHANGE UP (ref 0–0.9)
IMM GRANULOCYTES NFR BLD AUTO: 0.5 % — SIGNIFICANT CHANGE UP (ref 0–0.9)
INR BLD: 1.89 RATIO — HIGH (ref 0.85–1.18)
INR BLD: 1.89 RATIO — HIGH (ref 0.85–1.18)
LYMPHOCYTES # BLD AUTO: 0.71 K/UL — LOW (ref 1–3.3)
LYMPHOCYTES # BLD AUTO: 0.71 K/UL — LOW (ref 1–3.3)
LYMPHOCYTES # BLD AUTO: 9.2 % — LOW (ref 13–44)
LYMPHOCYTES # BLD AUTO: 9.2 % — LOW (ref 13–44)
MAGNESIUM SERPL-MCNC: 2.2 MG/DL — SIGNIFICANT CHANGE UP (ref 1.6–2.6)
MAGNESIUM SERPL-MCNC: 2.2 MG/DL — SIGNIFICANT CHANGE UP (ref 1.6–2.6)
MCHC RBC-ENTMCNC: 24.6 PG — LOW (ref 27–34)
MCHC RBC-ENTMCNC: 24.6 PG — LOW (ref 27–34)
MCHC RBC-ENTMCNC: 29.9 GM/DL — LOW (ref 32–36)
MCHC RBC-ENTMCNC: 29.9 GM/DL — LOW (ref 32–36)
MCV RBC AUTO: 82.2 FL — SIGNIFICANT CHANGE UP (ref 80–100)
MCV RBC AUTO: 82.2 FL — SIGNIFICANT CHANGE UP (ref 80–100)
MONOCYTES # BLD AUTO: 0.46 K/UL — SIGNIFICANT CHANGE UP (ref 0–0.9)
MONOCYTES # BLD AUTO: 0.46 K/UL — SIGNIFICANT CHANGE UP (ref 0–0.9)
MONOCYTES NFR BLD AUTO: 5.9 % — SIGNIFICANT CHANGE UP (ref 2–14)
MONOCYTES NFR BLD AUTO: 5.9 % — SIGNIFICANT CHANGE UP (ref 2–14)
NEUTROPHILS # BLD AUTO: 6.52 K/UL — SIGNIFICANT CHANGE UP (ref 1.8–7.4)
NEUTROPHILS # BLD AUTO: 6.52 K/UL — SIGNIFICANT CHANGE UP (ref 1.8–7.4)
NEUTROPHILS NFR BLD AUTO: 84.3 % — HIGH (ref 43–77)
NEUTROPHILS NFR BLD AUTO: 84.3 % — HIGH (ref 43–77)
NRBC # BLD: 0 /100 WBCS — SIGNIFICANT CHANGE UP (ref 0–0)
NRBC # BLD: 0 /100 WBCS — SIGNIFICANT CHANGE UP (ref 0–0)
PHOSPHATE SERPL-MCNC: 4.6 MG/DL — HIGH (ref 2.5–4.5)
PHOSPHATE SERPL-MCNC: 4.6 MG/DL — HIGH (ref 2.5–4.5)
PLATELET # BLD AUTO: 240 K/UL — SIGNIFICANT CHANGE UP (ref 150–400)
PLATELET # BLD AUTO: 240 K/UL — SIGNIFICANT CHANGE UP (ref 150–400)
POTASSIUM SERPL-MCNC: 4.6 MMOL/L — SIGNIFICANT CHANGE UP (ref 3.5–5.3)
POTASSIUM SERPL-MCNC: 4.6 MMOL/L — SIGNIFICANT CHANGE UP (ref 3.5–5.3)
POTASSIUM SERPL-SCNC: 4.6 MMOL/L — SIGNIFICANT CHANGE UP (ref 3.5–5.3)
POTASSIUM SERPL-SCNC: 4.6 MMOL/L — SIGNIFICANT CHANGE UP (ref 3.5–5.3)
PROT SERPL-MCNC: 6.3 G/DL — SIGNIFICANT CHANGE UP (ref 6–8.3)
PROT SERPL-MCNC: 6.3 G/DL — SIGNIFICANT CHANGE UP (ref 6–8.3)
PROTHROM AB SERPL-ACNC: 19.5 SEC — HIGH (ref 9.5–13)
PROTHROM AB SERPL-ACNC: 19.5 SEC — HIGH (ref 9.5–13)
RBC # BLD: 4.39 M/UL — SIGNIFICANT CHANGE UP (ref 4.2–5.8)
RBC # BLD: 4.39 M/UL — SIGNIFICANT CHANGE UP (ref 4.2–5.8)
RBC # FLD: 15.7 % — HIGH (ref 10.3–14.5)
RBC # FLD: 15.7 % — HIGH (ref 10.3–14.5)
SODIUM SERPL-SCNC: 136 MMOL/L — SIGNIFICANT CHANGE UP (ref 135–145)
SODIUM SERPL-SCNC: 136 MMOL/L — SIGNIFICANT CHANGE UP (ref 135–145)
WBC # BLD: 7.74 K/UL — SIGNIFICANT CHANGE UP (ref 3.8–10.5)
WBC # BLD: 7.74 K/UL — SIGNIFICANT CHANGE UP (ref 3.8–10.5)
WBC # FLD AUTO: 7.74 K/UL — SIGNIFICANT CHANGE UP (ref 3.8–10.5)
WBC # FLD AUTO: 7.74 K/UL — SIGNIFICANT CHANGE UP (ref 3.8–10.5)

## 2023-11-09 PROCEDURE — 99233 SBSQ HOSP IP/OBS HIGH 50: CPT

## 2023-11-09 PROCEDURE — 71046 X-RAY EXAM CHEST 2 VIEWS: CPT | Mod: 26

## 2023-11-09 PROCEDURE — 93010 ELECTROCARDIOGRAM REPORT: CPT

## 2023-11-09 PROCEDURE — 99291 CRITICAL CARE FIRST HOUR: CPT | Mod: GC,25

## 2023-11-09 PROCEDURE — 99223 1ST HOSP IP/OBS HIGH 75: CPT

## 2023-11-09 PROCEDURE — 76770 US EXAM ABDO BACK WALL COMP: CPT | Mod: 26

## 2023-11-09 PROCEDURE — 93923 UPR/LXTR ART STDY 3+ LVLS: CPT | Mod: 26

## 2023-11-09 RX ORDER — WARFARIN SODIUM 2.5 MG/1
5 TABLET ORAL ONCE
Refills: 0 | Status: COMPLETED | OUTPATIENT
Start: 2023-11-09 | End: 2023-11-09

## 2023-11-09 RX ADMIN — Medication 1: at 11:39

## 2023-11-09 RX ADMIN — WARFARIN SODIUM 5 MILLIGRAM(S): 2.5 TABLET ORAL at 21:38

## 2023-11-09 RX ADMIN — HEPARIN SODIUM 8 UNIT(S)/HR: 5000 INJECTION INTRAVENOUS; SUBCUTANEOUS at 06:09

## 2023-11-09 RX ADMIN — Medication 75 MILLIGRAM(S): at 07:42

## 2023-11-09 RX ADMIN — AMIODARONE HYDROCHLORIDE 400 MILLIGRAM(S): 400 TABLET ORAL at 21:36

## 2023-11-09 RX ADMIN — PANTOPRAZOLE SODIUM 40 MILLIGRAM(S): 20 TABLET, DELAYED RELEASE ORAL at 06:09

## 2023-11-09 RX ADMIN — Medication 1: at 07:55

## 2023-11-09 RX ADMIN — Medication 6 UNIT(S): at 11:39

## 2023-11-09 RX ADMIN — BUMETANIDE 2 MILLIGRAM(S): 0.25 INJECTION INTRAMUSCULAR; INTRAVENOUS at 13:28

## 2023-11-09 RX ADMIN — Medication 75 MILLIGRAM(S): at 21:36

## 2023-11-09 RX ADMIN — Medication 6 UNIT(S): at 07:46

## 2023-11-09 RX ADMIN — AMIODARONE HYDROCHLORIDE 400 MILLIGRAM(S): 400 TABLET ORAL at 06:09

## 2023-11-09 RX ADMIN — ISOSORBIDE DINITRATE 30 MILLIGRAM(S): 5 TABLET ORAL at 06:09

## 2023-11-09 RX ADMIN — ATORVASTATIN CALCIUM 80 MILLIGRAM(S): 80 TABLET, FILM COATED ORAL at 21:37

## 2023-11-09 RX ADMIN — INSULIN GLARGINE 15 UNIT(S): 100 INJECTION, SOLUTION SUBCUTANEOUS at 21:38

## 2023-11-09 RX ADMIN — Medication 25 MILLIGRAM(S): at 06:09

## 2023-11-09 RX ADMIN — Medication 6 UNIT(S): at 18:00

## 2023-11-09 RX ADMIN — Medication 75 MILLIGRAM(S): at 15:16

## 2023-11-09 RX ADMIN — CHLORHEXIDINE GLUCONATE 1 APPLICATION(S): 213 SOLUTION TOPICAL at 06:10

## 2023-11-09 RX ADMIN — ISOSORBIDE DINITRATE 30 MILLIGRAM(S): 5 TABLET ORAL at 17:56

## 2023-11-09 RX ADMIN — TAMSULOSIN HYDROCHLORIDE 0.4 MILLIGRAM(S): 0.4 CAPSULE ORAL at 21:36

## 2023-11-09 RX ADMIN — Medication 81 MILLIGRAM(S): at 11:39

## 2023-11-09 RX ADMIN — HEPARIN SODIUM 8 UNIT(S)/HR: 5000 INJECTION INTRAVENOUS; SUBCUTANEOUS at 18:31

## 2023-11-09 RX ADMIN — AMIODARONE HYDROCHLORIDE 400 MILLIGRAM(S): 400 TABLET ORAL at 13:29

## 2023-11-09 RX ADMIN — BUMETANIDE 2 MILLIGRAM(S): 0.25 INJECTION INTRAMUSCULAR; INTRAVENOUS at 06:09

## 2023-11-09 RX ADMIN — ISOSORBIDE DINITRATE 30 MILLIGRAM(S): 5 TABLET ORAL at 11:39

## 2023-11-09 NOTE — PROGRESS NOTE ADULT - ASSESSMENT
57 yo Male with prior Stroke with mild ?left sided deficits (improved), HTN, DM2, vertigo, HLD, Mobitz II s/pp Medtronic dual chamber ICD (12/21/22) initially presented to [facility] from home with complaints of dyspnea and chest pain and was admitted w/ acute hypoxic respiratory failure likely due to acute pulmonary edema due to acute HFrEF in setting of acute NSTEMI, LAURA and enterovirus infection. Pt with brief MICU stay at [facility] requiring bipap (no intubation), was treated for PNA with cefepime, and was found to have TTE done 9/26/23 showing EF 20% with severely decreased LVSF, multiple regional wall motion abnormalities, and elevated LV end diastolic pressure. Pt was transferred to [facility] for cardiac eval.   TTE EF < 20%  RHC 10/19: RA 10, RV 47/9/13, Wedge 29, PA 41/26/33, CO/CI 4.4/2.3, PA sat 57.3  EKG: sinus tachycardia, septal q-waves, left axis deviation   TTE 10/16/23: LV 5.5 cm, LVEF 20% with regional WMAs worse in the apex, LVOT VTI 8 cm, normal RV size/function, severe functional MR, small pericardial effusion, estimated RA pressure 8 mmHg   TTE 12/2022: normal LVEF   LHC: 95% discrete proximal LAD disease and sequential multifocal disease with good distal target, 80-90% proximal LCx disease just prior to marginals, severe multifocal RCA disease with good targets   A1c 8.4    CD 10/17 neg   NIHSS 1  CTH with age indeterminate R cerebellar infarct.  likely chronic   CTA with mod L MCA stenosis, R VA occlusion, severe L VA stenosis   MRi brain with eovlving acute/subacute R PICA infarct with mild edema and mild hemorrhagic transformation. 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  occluded R VA. severe L VA stenosis   CTH stable   RRT/code stroke on 11/6 in setting of SBP 80s/50s   CTH stable. old R cerebellar infarct. CTA with severe L VA stenosis ; R VA occlusion   11/8 RRT transferred to CCU. no neuro changes   11/9 no neuro changes     Impression:   R cerebellar/PICA infarct as well as L parietal embolic infarct, likely cardioembolic   worsening symptoms in setting of hypotension,m now improved       - reached out to neuro IR for any role of stenting his L VA given his R VA occlusion and severe L VA stenosis.  may change decision on cardiac management if we can revascularize.   reached out to Neuro IR fellow, awaiting call back after review.    - repeat CTH end of the week.  patient will be high risk from neuro standpoint for cardiac intervention however benefits at this point seem to outweigh risks.  would consider PCI staging next week if repeat CTH stable.  triple therapy will be needed    - keep SBP > 100; becomes symptomatic when BP drops   - c/o SOB ; f/u cardio and pulmo   - f/u heart failure team   - can consider routine EEG but low yield   - CTS eval for CABG given 3 vessel disease vs high risk PCI --> not a CABG candidate ; possible PCI planning but would need "triple therapy"   - c/w asa and statin therpay for secondary stroke prevention.   - no objection to heparin drip for low EF and LV thrombus  --> no objection to transition to coumadin ; hep to coumadin bridge ; coumadin per INR   - called for risk stratification for heart transplant eval,  low-mod risk from stroke standpoint and no objection   - b12, TSH, RPR for reversible causes of dementia   - telemetry  - PT/OT   - check FS, glucose control <180  - GI/DVT ppx   - Thank you for allowing me to participate in the care of this patient. Call with questions.   spoke with primary team     Abelardo Irving MD  Vascular Neurology  Office: [phone]

## 2023-11-09 NOTE — CONSULT NOTE ADULT - PROBLEM SELECTOR PROBLEM 3
Acute HFrEF (heart failure with reduced ejection fraction)
Severe mitral regurgitation
CAD (coronary artery disease)

## 2023-11-09 NOTE — PROGRESS NOTE ADULT - PROBLEM SELECTOR PLAN 2
s/p LHC-> 95% discrete proximal LAD disease and sequential multifocal disease with good distal target, 80-90% proximal LCx disease just prior to marginals, severe multifocal RCA. Limited viability on MRI  - EF <20%  - Not a candidate for CABG  - Plans- c/w ASA, statin, Metoprolol, IV heparin gtt  - may need high risk PCI

## 2023-11-09 NOTE — PROGRESS NOTE ADULT - SUBJECTIVE AND OBJECTIVE BOX
24H hour events: SR/-110 w/ multiple episodes of nonsust MMVT overnight, one episode of sustained MMVT with spontaneous termination ~150bpm    MEDICATIONS:  aMIOdarone    Tablet   Oral   aMIOdarone    Tablet 400 milliGRAM(s) Oral every 8 hours  aspirin enteric coated 81 milliGRAM(s) Oral daily  buMETAnide Injectable 2 milliGRAM(s) IV Push two times a day  heparin  Infusion 1100 Unit(s)/Hr IV Continuous <Continuous>  hydrALAZINE 75 milliGRAM(s) Oral every 8 hours  isosorbide   dinitrate Tablet (ISORDIL) 30 milliGRAM(s) Oral three times a day  metoprolol succinate ER 25 milliGRAM(s) Oral daily  warfarin 5 milliGRAM(s) Oral once    pantoprazole    Tablet 40 milliGRAM(s) Oral before breakfast  polyethylene glycol 3350 17 Gram(s) Oral daily PRN  senna 2 Tablet(s) Oral at bedtime    atorvastatin 80 milliGRAM(s) Oral at bedtime  insulin glargine Injectable (LANTUS) 15 Unit(s) SubCutaneous at bedtime  insulin lispro (ADMELOG) corrective regimen sliding scale   SubCutaneous three times a day before meals  insulin lispro (ADMELOG) corrective regimen sliding scale   SubCutaneous at bedtime  insulin lispro Injectable (ADMELOG) 6 Unit(s) SubCutaneous three times a day before meals    tamsulosin 0.4 milliGRAM(s) Oral at bedtime      REVIEW OF SYSTEMS:  See HPI, otherwise ROS negative.    PHYSICAL EXAM:  T(C): 36.6 (11-09-23 @ 11:00), Max: 36.7 (11-08-23 @ 15:00)  HR: 101 (11-09-23 @ 12:00) (101 - 115)  BP: 99/56 (11-09-23 @ 12:00) (93/55 - 132/82)  RR: 28 (11-09-23 @ 12:00) (11 - 36)  SpO2: 94% (11-09-23 @ 12:00) (87% - 99%)  Wt(kg): --  I&O's Summary    08 Nov 2023 07:01  -  09 Nov 2023 07:00  --------------------------------------------------------  IN: 892 mL / OUT: 800 mL / NET: 92 mL    09 Nov 2023 07:01  -  09 Nov 2023 12:51  --------------------------------------------------------  IN: 220 mL / OUT: 100 mL / NET: 120 mL        Appearance: A&O1-2. NAD	  Cardiovascular: +S1S2 RRR no m/g/r  Respiratory: CTA B/L	  Psychiatry: A & O x 3, Mood & affect appropriate  Gastrointestinal:  Soft, NT. ND. +BS	  Skin: No rashes	masses or lesions  Neurologic: Non-focal  Extremities: No edema BLE  Vascular: Peripheral pulses palpable 2+ bilaterally      LABS:	 	    CBC Full  -  ( 09 Nov 2023 03:52 )  WBC Count : 7.74 K/uL  Hemoglobin : 10.8 g/dL  Hematocrit : 36.1 %  Platelet Count - Automated : 240 K/uL  Mean Cell Volume : 82.2 fl  Mean Cell Hemoglobin : 24.6 pg  Mean Cell Hemoglobin Concentration : 29.9 gm/dL  Auto Neutrophil # : 6.52 K/uL  Auto Lymphocyte # : 0.71 K/uL  Auto Monocyte # : 0.46 K/uL  Auto Eosinophil # : 0.00 K/uL  Auto Basophil # : 0.01 K/uL  Auto Neutrophil % : 84.3 %  Auto Lymphocyte % : 9.2 %  Auto Monocyte % : 5.9 %  Auto Eosinophil % : 0.0 %  Auto Basophil % : 0.1 %    11-09    136  |  101  |  44<H>  ----------------------------<  172<H>  4.6   |  20<L>  |  2.12<H>  11-08    136  |  102  |  47<H>  ----------------------------<  124<H>  3.9   |  21<L>  |  2.21<H>    Ca    9.0      09 Nov 2023 03:52  Ca    9.0      08 Nov 2023 17:31  Phos  4.6     11-09  Phos  5.6     11-08  Mg     2.2     11-09  Mg     2.3     11-08    TPro  6.3  /  Alb  3.3  /  TBili  0.4  /  DBili  x   /  AST  18  /  ALT  38  /  AlkPhos  97  11-09  TPro  6.4  /  Alb  3.4  /  TBili  0.3  /  DBili  x   /  AST  21  /  ALT  40  /  AlkPhos  101  11-08      proBNP: Pro-Brain Natriuretic Peptide: 44901 pg/mL (11.03.23 @ 00:15)  TSH: Thyroid Stimulating Hormone, Serum: 0.39 uIU/mL (10.29.23 @ 00:03)    TELEMETRY: SR/ST with MMVT overnight (~45 seconds) and multiple episodes NSVT   	    TTE: < from: TTE W or WO Ultrasound Enhancing Agent (11.08.23 @ 07:07) >    _______________________________________________________________________________________     CONCLUSIONS:      1. Left ventricular systolic function is severely decreased with an ejection fraction visually estimated at <20 %. Global left ventricular hypokinesis.   2. Compared to the transthoracic echocardiogram performed on 10/26/2023 the thrombus is much smaller.    ________________________________________________________________________________________  FINDINGS:     Left Ventricle:  Left ventricular systolic function is severely decreased with an ejection fraction visually estimated at <20%. There is global left ventricular hypokinesis. There is a small left ventricular thrombus.  ____________________________________________________________________  Quantitative Data:  Left Ventricle Measurements: (Indexed to BSA)     Visualized LV EF%: <20%    < end of copied text >

## 2023-11-09 NOTE — PROGRESS NOTE ADULT - SUBJECTIVE AND OBJECTIVE BOX
Neurology        S: patient seen and examined.  no neuro changes.           Medications: MEDICATIONS  (STANDING):  aMIOdarone    Tablet   Oral   aMIOdarone    Tablet 400 milliGRAM(s) Oral every 8 hours  aspirin enteric coated 81 milliGRAM(s) Oral daily  atorvastatin 80 milliGRAM(s) Oral at bedtime  buMETAnide Injectable 2 milliGRAM(s) IV Push two times a day  chlorhexidine 2% Cloths 1 Application(s) Topical <User Schedule>  heparin  Infusion 1100 Unit(s)/Hr (8 mL/Hr) IV Continuous <Continuous>  hydrALAZINE 75 milliGRAM(s) Oral every 8 hours  insulin glargine Injectable (LANTUS) 15 Unit(s) SubCutaneous at bedtime  insulin lispro (ADMELOG) corrective regimen sliding scale   SubCutaneous three times a day before meals  insulin lispro (ADMELOG) corrective regimen sliding scale   SubCutaneous at bedtime  insulin lispro Injectable (ADMELOG) 6 Unit(s) SubCutaneous three times a day before meals  isosorbide   dinitrate Tablet (ISORDIL) 30 milliGRAM(s) Oral three times a day  metoprolol succinate ER 25 milliGRAM(s) Oral daily  pantoprazole    Tablet 40 milliGRAM(s) Oral before breakfast  senna 2 Tablet(s) Oral at bedtime  tamsulosin 0.4 milliGRAM(s) Oral at bedtime  warfarin 5 milliGRAM(s) Oral once    MEDICATIONS  (PRN):  polyethylene glycol 3350 17 Gram(s) Oral daily PRN Constipation       Vitals:  Vital Signs Last 24 Hrs  T(C): 36.6 (09 Nov 2023 06:00), Max: 36.7 (08 Nov 2023 15:00)  T(F): 97.8 (09 Nov 2023 06:00), Max: 98.1 (08 Nov 2023 15:00)  HR: 101 (09 Nov 2023 10:00) (101 - 115)  BP: 106/69 (09 Nov 2023 10:00) (93/55 - 132/82)  BP(mean): 81 (09 Nov 2023 10:00) (67 - 102)  RR: 16 (09 Nov 2023 10:00) (11 - 36)  SpO2: 95% (09 Nov 2023 10:00) (87% - 99%)    Parameters below as of 09 Nov 2023 09:54    O2 Flow (L/min): 0         General Exam:   General Appearance: Appropriately dressed and in no acute distress       Head: Normocephalic, atraumatic and no dysmorphic features  Ear, Nose, and Throat: Moist mucous membranes  CVS: S1S2+  Resp: No SOB, no wheeze or rhonchi  GI: soft NT/ND  Extremities: No edema or cyanosis  Skin: No bruises or rashes     Neurological Exam:  Mental Status: Awake, alert and oriented x 2.  Able to follow simple verbal commands. Able to name and repeat. fluent speech. No obvious aphasia or dysarthria noted. poor insight. not understanding why he is in hospital   Cranial Nerves: PERRL, EOMI, VFFC, sensation V1-V3 intact,  no obvious facial asymmetry, equal elevation of palate, scm/trap 5/5, tongue is midline on protrusion. no obvious papilledema on fundoscopic exam. hearing is grossly intact.   Motor: Normal bulk, tone and strength throughout. Fine finger movements were intact and symmetric. no tremors or drift noted.    Sensation: Intact to light touch and pinprick throughout. no right/left confusion. no extinction to tactile on DSS. Romberg was negative.   Reflexes: 1+ throughout at biceps, brachioradialis, triceps, patellars and ankles bilaterally and equal. No clonus. R toe and L toe were both downgoing.  Coordination: No dysmetria on FNF or HKS  Gait:   no limitations in gait.     Data/Labs/Imaging which I personally reviewed.       LABS:                          10.8   7.74  )-----------( 240      ( 09 Nov 2023 03:52 )             36.1     11-09    136  |  101  |  44<H>  ----------------------------<  172<H>  4.6   |  20<L>  |  2.12<H>    Ca    9.0      09 Nov 2023 03:52  Phos  4.6     11-09  Mg     2.2     11-09    TPro  6.3  /  Alb  3.3  /  TBili  0.4  /  DBili  x   /  AST  18  /  ALT  38  /  AlkPhos  97  11-09    LIVER FUNCTIONS - ( 09 Nov 2023 03:52 )  Alb: 3.3 g/dL / Pro: 6.3 g/dL / ALK PHOS: 97 U/L / ALT: 38 U/L / AST: 18 U/L / GGT: x           PT/INR - ( 09 Nov 2023 03:52 )   PT: 19.5 sec;   INR: 1.89 ratio         PTT - ( 09 Nov 2023 03:52 )  PTT:55.9 sec  Urinalysis Basic - ( 09 Nov 2023 03:52 )    Color: x / Appearance: x / SG: x / pH: x  Gluc: 172 mg/dL / Ketone: x  / Bili: x / Urobili: x   Blood: x / Protein: x / Nitrite: x   Leuk Esterase: x / RBC: x / WBC x   Sq Epi: x / Non Sq Epi: x / Bacteria: x            x           < from: CT Head No Cont (10.27.23 @ 18:04) >    ACC: [id number] EXAM:  CT BRAIN   ORDERED BY: BRETT HOPSON     PROCEDURE DATE:  10/27/2023          INTERPRETATION:  Clinical Indication: CVA    5mm axial sections of the brain were obtained from base to vertex,   without the intravenous administration of contrast material. Coronal and   sagittal computer generated reconstructed views are available.    No prior brain imaging is available for comparison.          The ventricles and sulci are prominent consistent with mild atrophy.   Small vesselwhite matter ischemic changes are noted. There is a infarct   in the right medial cerebellum of undetermined age which could be acute   to subacute based on its degree of lucency. There is no significant   hemorrhage. Bone window examination is unremarkable. There is been   previous right-sided lens replacement surgery.      IMPRESSION: Atrophy and small vessel white matter ischemic changes. Right   medial cerebellar infarct may be acute versus subacute. No hemorrhage.      --- End of Report ---    < from: MR Angio Head No Cont (10.30.23 @ 20:58) >    ACC: [id number] EXAM:  MR ANGIO BRAIN   ORDERED BY: NATALIE CARRANZA     ACC: [id number] EXAM:  MR ANGIO NECK   ORDERED BY: NATALIE CARRANZA     ACC: [id number] EXAM:  MR BRAIN   ORDERED BY: NAVNEET CORONADO     PROCEDURE DATE:  10/30/2023          INTERPRETATION:  .    CLINICAL INFORMATION: Stroke.    TECHNIQUE: Multiplanar multi sequential MRI examination of the brain was   performed without the administration of IV gadolinium. MRA images through   the neck and [place] of [place] were obtained usinga combination of 2-D   and 3-D time-of-flight acquisition. The data was then reformatted into a   volumetric data set and reviewed as rotational MIP images.    COMPARISON: Most recent prior CT examination of the head from 10/27/2023.   No prior MRI studies are available for comparison.    FINDINGS:    MRI Brain: A wedge-shaped area of diffusion restriction is seen within   the right PICA territory. Associated T2/FLAIR hyperintense signal is also   seen, compatible with cytotoxic edema. Mild hemorrhagic transformation is   seen within the infarct bed manifested by high signal on T1-weighted   imaging as well as susceptibility artifact on the SWI sequence. Mild mass   effect is seen without effacement of the fourth ventricle or shift of the   posterior fossa midline structures.    This is superimposed upon encephalomalacia and gliosis involving the   right cerebellar hemisphere.    An additional vague area of diffusion reduction is seen within the left   parietal periventricular white matter without associated T2/FLAIR   hyperintense signal, compatible with cytotoxic edema. No gross   hemorrhagic transformation is noted in this location.    Scattered foci of susceptibility artifact are seen within the bilateral   basal ganglia, compatible with areas of chronic microhemorrhage.    Multiple additional patchy confluent nonspecific T2/FLAIR hyperintense   signal changes are noted throughout the bihemispheric white matter   without associated mass effect or restricted diffusion.    Ventricular size and configuration is unremarkable. No abnormal   extra-axial fluid collections are seen. Flow-voids are noted throughout   the major intracranial vessels, on the T2 weighted images, consistent   with their patency. The sellar location appears unremarkable. There is an   unchanged appearing small retrocerebellar arachnoid cyst versus cisterna   magna.    A polyp versus retention cyst is seen within the right maxillary sinus.   Additional minor scattered mucosal thickening seen throughout the ethmoid   labyrinth. The tympanomastoid cavities are clear. The left orbit appears   within normal limits. There is evidence of right-sided cataract removal.    There is an indeterminate mixed signal ovoid shaped soft tissue focus   involving the left superior pinna measuring 1.4 x 0.9 cm which appears   unchanged.    MRA Neck: Examination is motion limited.    There is a standard anatomic variation to the aortic arch. The origins of   the great vessels appear grossly unremarkable.    The bilateral common carotid arteries, carotid bifurcations and cervical   internal carotid arteries appear patent. The origin of the left vertebral   artery is normal. The left vertebral artery is patent.    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    MRA [place] of [place]: Atherosclerosis affects the bilateral carotid   siphons with mild area of focal stenosis involving the right   clinoid/supraclinoid junction. There is mild hypoplasia of the right A1   segment. Otherwise, the bilateral intracranial internal carotid,   anterior, and middle cerebral arteries appear unremarkable.    The anterior and bilateral posterior communicating arteries are seen.    The right V4 segment does not demonstrate flow relatedsignal. The left   V4 segment appears unremarkable. The vertebrobasilar confluence appears   unremarkable. Long segment mild stenosis involving the mid basilar   artery. The basilar tip appears unremarkable as well as the bilateral   posterior cerebral arteries.    IMPRESSION:    MRI BRAIN:  1. Evolving acute/subacute right-sided PICA distribution infarction with   associated cytotoxic edema and very mild hemorrhagic transformation.  2. Additional wedge-shaped area of acute/subacute ischemia within the   left parietal periatrial white matter with associated cytotoxic edema.  3. Indeterminate soft tissue lesion involving the left superior pinna   measuring 1.4 x 0.9 cm. Neoplasm cannot be excluded. Recommend   correlation with direct physical examination and visualization.    MRA NECK:  1. Extremely motion limited study.  2. 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 Recommend further evaluation with a CT angiogram study   of the neck.    MRA HEAD:  1. 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 This can be further evaluated with a CT angiogram study of the   head.  2. Mild focal stenosis of the right supraclinoid intracranial internal   carotid artery secondary to atherosclerosis.  3. Otherwise, no large vessel occlusion or major stenosis.    --- End of Report ---      < from: CT Brain Stroke Protocol (11.06.23 @ 14:11) >    ACC: [id number] EXAM:  CT BRAIN STROKE PROTOCOL   ORDERED BY:  KAMILA TAVAREZ     ACC: [id number] EXAM:  CT ANGIO NECK STROKE PROTCL IC   ORDERED BY:  KAMILA TAVAREZ     ACC: [id number] EXAM:  CT ANGIO BRAIN STROKE PROTC IC   ORDERED BY:  KAMILA TAVAREZ     PROCEDURE DATE:  11/06/2023          INTERPRETATION:  Clinical Indication: Code stroke for dizziness, prior   infarct one week ago    5mm axial sections of the brain were obtained from base to vertex,   without the intravenous administration of contrast material. Coronal and   sagittal computer generated reconstructed views are available.    Comparison is made with the prior CT of 10/27/2023 and the MRI 10/30/2023.    There is moderate atrophy for the patient's age with ventricular and   sulcal prominence. Small vessel white matter ischemic changes are noted.   There is an old right medial occipital infarct. No acute hemorrhage is   identified.        After the intravenous power injection of 70 cc of Omnipaque 300 using a   bolus amol timing run serial thin sections were obtained through the   neck from the thoracic inlet through the intracranial circulation   centered at the [id number] on a multislice CT scanner reformatted   with coronal and sagittal 2 D-MIP projections, including 3 D  reconstructions using a separate 3D [employer] software workstation.A total   of  70 cc of Omnipaque were intravenously injected.  30 cc were discarded.    The origins of the common carotid arteries are normal. The origin of the   left dominant left vertebral artery demonstrates moderate stenosis   nondominant small right vertebral artery appears hypoplastic on a   congenital basis and is occluded at the V3 and V4 portion.    There is severe stenosis of the V4 segment of the left vertebral artery   just proximal to the VV junction. The basilar artery is normal. The   posterior cerebral and superior cerebellar arteries are normal.    Evaluation of the carotid arteries demonstrate normal appearance to   distal cervical, petrous, cavernous and supraclinoid internal carotid   arteries. The anterior cerebral arteries and anterior communicating   artery are visualized, the right A1 segment is hypoplastic on a   congenital basis. The middle cerebral arteries are visualized, the left   M1 segment is moderately narrowed but patent. The middle cerebral artery   vessels in the sylvian fissure unremarkable.      The normal intracranial venous circulation is identified. The right   transverse sinus is dominant. The superior sagittal sinus, internal   cerebral veins, vein of Jeremías, straight sinus, transverse sinuses,   sigmoid sinuses and internal jugular veins are normal.  Cortical veins are normal.      There are large bilateral pleural effusions and consolidation in the left   upper lobe whichis similar to a prior exam of 11/5/2023. There is a   left-sided permanent pacemaker.    IMPRESSION: Atrophy for the patient's age. Old right cerebellar infarct.   No hemorrhage. No change since 10/30/2023.    Severe stenosis of the dominant left vertebral artery at the V4 segment   and moderate stenosis at the origin. 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 Moderate left M1 stenosis.    --- End of Report ---            ANDREW TONEY MD; Attending Radiologist  This document has been electronically signed. Nov 6 2023  2:35PM    < end of copied text >        LAKESHA DIAZ MD; Attending Radiologist  This document has been electronically signed. Oct 30 2023  9:20PM    < end of copied text >

## 2023-11-09 NOTE — PROGRESS NOTE ADULT - PROBLEM SELECTOR PLAN 2
- 95% discrete proximal LAD disease and sequential multifocal disease with good distal target, 80-90% proximal LCx disease just prior to marginals, severe multifocal RCA. No viability on MRI.  - There is greater than 50% thickness subendocardial LGE within the mid anteroseptum, anterior and anterolateral segments and apical segments  - May consider revascularization for EF improvement and limit ectopy    > Speaking to team about it, may need to get PCI with MCS back up as possible BTT/BTVad, likely benefit from ablation as well if NSVT is ischemic

## 2023-11-09 NOTE — PROGRESS NOTE ADULT - ATTENDING COMMENTS
Patient is well known to this service  He has been admitted to the CICU two different times for cardiogenic shock during this admission  He has an ischemic cardiomyopathy and is not a revascularization candidate nor a candidate for advanced therapies  He is now re-admitted s/p a rapid response due to monomorphic VT which appear associated with hypoglycemia  A+Ox3 but with poor insight, s/p CVA  Hemodynamics acceptable, no pressors or inotropes - continue Toprol  EP is following, intermittent episodes of NSVT, loading with Amiodarone  TTE with severely reduced LVEF - continue Bidil  O2 sats mid 90s on nasal cannula, bilateral pleural effusions - defer thoracentesis, wean to room air  DASH/diabetic diet  Cr elevated, appears around baseline, likely CKD; overloaded on exam - continue to diurese with IV Bumex  H/H low but acceptable on Heparin drip for LV thrombus  Afebrile, no antibiotics  Hypoglycemia improved after decreasing Lantus - will maintain at decreased dose  No central access    Poor prognosis - he is not a candidate for advanced therapies or revascularization and has had multiple ICU admissions in the last month. His body is failing and there are no further aggressive measures we can offer to help him recover and reverse this general decompensation. Will consult Palliative. Patient is well known to this service  He has been admitted to the CICU two different times for cardiogenic shock during this admission  He has an ischemic cardiomyopathy and is not a revascularization candidate nor a candidate for advanced therapies  He is now re-admitted s/p a rapid response due to monomorphic VT which appear associated with hypoglycemia  A+Ox3 but with poor insight, s/p CVA  Hemodynamics acceptable, no pressors or inotropes - continue Toprol  EP is following, intermittent episodes of NSVT, loading with Amiodarone  TTE with severely reduced LVEF - continue Bidil  O2 sats mid 90s on nasal cannula, bilateral pleural effusions - defer thoracentesis, wean to room air  DASH/diabetic diet  Cr elevated, appears around baseline, likely CKD; overloaded on exam - continue to diurese with IV Bumex  H/H low but acceptable on Heparin drip for LV thrombus  Afebrile, no antibiotics  Hypoglycemia improved after decreasing Lantus - will maintain at decreased dose  No central access    Poor prognosis - he is not a candidate for advanced therapies or revascularization and has had multiple ICU admissions in the last month. His body is failing and there are no further aggressive measures we can offer to help him recover and reverse this general decline. Will consult Palliative.

## 2023-11-09 NOTE — PROGRESS NOTE ADULT - ASSESSMENT
The patient is a 58M with h/o prior CVA (2012) with mild L sided weakness, Mobitz II s/p [employer] dual chamber ICD (12/21/22) DM2, HTN, HLD, admitted in CCU as a transfer from [facility] for NSTEMI on 10/13/23. His hospital course has been complicated by acute systolic dysfunction, LV thrombus, and possible acute stroke. He was found to have triple vessel disease on LHC and a RHC with elevated filling pressures. Was also found to have MMVT and was evaluated by EP card, HF team, Pulmonary, Neurology and Palliative care.

## 2023-11-09 NOTE — CONSULT NOTE ADULT - PROBLEM SELECTOR RECOMMENDATION 2
- underwent LHC which revealed severe three vessel coronary artery disease in a right dominant system.  - remains on heparin gtt for AC   - CTS consulted for possible CABG  - awaiting to undergo cMRI for viability   - At this time there are high risk features which include tachycardia up to 120s and reduce EF. Prior to proceed with OR would favor patient going to CTU/CICU for swan placement. If patient has poor hemodynamics would need to consider advance therapy eval. Will be discussing case with Dr. Alicea
management per CICU/EP
trend bmp  avoid nephrotoxic agents   strict I & O's  daily weight

## 2023-11-09 NOTE — PROGRESS NOTE ADULT - PROBLEM SELECTOR PLAN 1
ICM  - Last TTE: EF <20% rMWA  - Cath: : 95% discrete proximal LAD disease and sequential multifocal disease with good distal target, 80-90% proximal LCx disease just prior to marginals, severe multifocal RCA. Limited viability on MRI.   - GDMT:    > BB: MTP Succ 25 QDay     > ACE/ARB/ARNI: Stopped Entresto 24-26 BID. Cont HYdral 50mg/Isordil 30mg    > MRA: Stopped spironolactone 25 QDay     > SGLT2 I: Eventual farxiga 10mg daily prior to discharge  - Inotropes: Off milrinone since 11/1  - Diuretics: Bumex 2 IV BID (11/9)   - Advanced therapies: severe LV dysfunction with tachycardia and poor non-invasive hemodynamics concerning, currently holding off on launching VAD/transplant eval given cognitive issues however will continue to reassess. Of note he has good support and no clear psychosocial red flags. ABO AB.   - F/U PT recs - need to be reconsulted 11/9  - May consider PCI / Ablation and AT eval if pt amenable and felt to be a good candidate   - Please get MI for r/o PAD

## 2023-11-09 NOTE — PROGRESS NOTE ADULT - PROBLEM SELECTOR PLAN 3
- Slow VT on 11/7  - EP following   - Likely more scar based than ischemic  - Cont MTP Succ 25   - F/U EP recs   > Requested input about possible VT ablation 11/9

## 2023-11-09 NOTE — CONSULT NOTE ADULT - PROBLEM SELECTOR PROBLEM 1
Acute HFrEF (heart failure with reduced ejection fraction)
Debility
Acute HFrEF (heart failure with reduced ejection fraction)

## 2023-11-09 NOTE — PROGRESS NOTE ADULT - CRITICAL CARE SERVICES PROVIDED
Patient is critically ill, requiring critical care services.

## 2023-11-09 NOTE — CONSULT NOTE ADULT - PROBLEM SELECTOR RECOMMENDATION 5
Patient unable to demonstrate insight regarding his current hospitalization or medical history.  Will reach out to patient's wife to discuss goals of care.
continue statin

## 2023-11-09 NOTE — PROGRESS NOTE ADULT - SUBJECTIVE AND OBJECTIVE BOX
[facility] DIVISION OF PULMONARY, CRITICAL CARE and SLEEP MEDICINE  PULMONARY PROGRESS NOTE  OFFICE NUMBER: [phone]    PATIENT INFORMATION:  NAME: BRANDEN MARRUFO:  MRN: [id number]    CHIEF COMPLAINT: Patient is a 58y old  Male who presents with a chief complaint of NSTEMI (09 Nov 2023 12:51)      [x] INITIAL CONSULT, H&P, FAMILY HISTORY and PAST MEDICAL AND SURGICAL HISTORY REVIEWED    OVERNIGHT EVENTS or CHANGES TO HPI:   - Patient seen earlier today in CICU with plan to transfer to floors  - Patient remains on 2L NC with O2 sat 90-94%   - At time of my evaluation patient had just walked to bathroom and was appearing very dyspneic though he denies feeling short of breath  - Started on diuretics in CICU again - on this BID    ========================REVIEW OF SYSTEMS========================  CONSTITUTIONAL: No acute complaints - remains alert today  CARDIOVASCULAR: Denies chest pain or palpitations - no further ectopy reported   PULMONARY: No complaints - but appears dyspneic after walking  [x] REMAINING REVIEW OF SYSTEMS NEGATIVE  [] UNABLE TO OBTAIN REVIEW OF SYSTEMS DUE TO _______________    ========================MEDICATIONS=============================  MEDICATIONS  (STANDING):  aMIOdarone    Tablet   Oral   aMIOdarone    Tablet 400 milliGRAM(s) Oral every 8 hours  aspirin enteric coated 81 milliGRAM(s) Oral daily  atorvastatin 80 milliGRAM(s) Oral at bedtime  buMETAnide Injectable 2 milliGRAM(s) IV Push two times a day  heparin  Infusion 1100 Unit(s)/Hr (8 mL/Hr) IV Continuous <Continuous>  hydrALAZINE 75 milliGRAM(s) Oral every 8 hours  insulin glargine Injectable (LANTUS) 15 Unit(s) SubCutaneous at bedtime  insulin lispro (ADMELOG) corrective regimen sliding scale   SubCutaneous three times a day before meals  insulin lispro (ADMELOG) corrective regimen sliding scale   SubCutaneous at bedtime  insulin lispro Injectable (ADMELOG) 6 Unit(s) SubCutaneous three times a day before meals  isosorbide   dinitrate Tablet (ISORDIL) 30 milliGRAM(s) Oral three times a day  metoprolol succinate ER 25 milliGRAM(s) Oral daily  pantoprazole    Tablet 40 milliGRAM(s) Oral before breakfast  senna 2 Tablet(s) Oral at bedtime  tamsulosin 0.4 milliGRAM(s) Oral at bedtime  warfarin 5 milliGRAM(s) Oral once      MEDICATIONS  (PRN):  polyethylene glycol 3350 17 Gram(s) Oral daily PRN Constipation      ========================PHYSICAL EXAM============================    VITALS: ICU Vital Signs Last 24 Hrs  T(C): 36.3 (09 Nov 2023 14:03), Max: 36.7 (08 Nov 2023 15:00)  T(F): 97.4 (09 Nov 2023 14:03), Max: 98.1 (08 Nov 2023 15:00)  HR: 105 (09 Nov 2023 14:03) (101 - 112)  BP: 104/68 (09 Nov 2023 14:03) (93/55 - 132/82)  BP(mean): 72 (09 Nov 2023 13:00) (67 - 102)  RR: 18 (09 Nov 2023 14:03) (11 - 36)  SpO2: 95% (09 Nov 2023 14:03) (87% - 97%)    O2 Parameters below as of 09 Nov 2023 14:03  Patient On (Oxygen Delivery Method): nasal cannula  O2 Flow (L/min): 2      INTAKE and OUTPUT: I&O's Summary    08 Nov 2023 07:01  -  09 Nov 2023 07:00  --------------------------------------------------------  IN: 892 mL / OUT: 800 mL / NET: 92 mL    09 Nov 2023 07:01  -  09 Nov 2023 14:35  --------------------------------------------------------  IN: 220 mL / OUT: 100 mL / NET: 120 mL      VENTILATOR SETTINGS: N/A    GENERAL: NAD, AAOx 3  EYES: anicteric  EAR/NOSE/MOUTH/THROAT: NCAT, MMM, nares clear, trachea midline, no thrush  NECK: supple, HJR  CARDIOVASCULAR: RRR, S1S2  RESPIRATORY: decreased BS at bases, no wheeze, no accessory muscle use  ABDOMEN: soft, NT, ND, +BS  EXTREMITIES: no clubbing or cyanosis   SKIN: warm and dry  MUSCULOSKELETAL: strength intact  PSYCHIATRIC: calm     ========================LABORATORY RESULTS AND IMAGING=============                        10.8   7.74  )-----------( 240      ( 09 Nov 2023 03:52 )             36.1                                                    11-09    136  |  101  |  44<H>  ----------------------------<  172<H>  4.6   |  20<L>  |  2.12<H>    Ca    9.0      09 Nov 2023 03:52  Phos  4.6     11-09  Mg     2.2     11-09    TPro  6.3  /  Alb  3.3  /  TBili  0.4  /  DBili  x   /  AST  18  /  ALT  38  /  AlkPhos  97  11-09    Creatinine Trend: 2.12<--, 2.21<--, 2.19<--, 2.21<--, 2.24<--, 2.16<--    CT CHEST:     [] RADIOLOGY REVIEWED AND INTERPRETED BY ME      THANK YOU FOR ALLOWING US TO PARTICIPATE IN THE CARE OF THIS PATIENT

## 2023-11-09 NOTE — CONSULT NOTE ADULT - PROBLEM SELECTOR RECOMMENDATION 3
preop workup initiated for potential CABG with Dr. Alicea  ck echo/ carotids/ EMILEE  ck cbc/ cmp/ type and screen  d/c plavix--> ck p2y12   ck UA  ck chest xray  continue asa/ statin/ bb
- echo revealed severe MR  - CTS consulted
Heart failure team following  Case discussed with team and patient is not a candidate for advanced therapy evaluation at this time. Work up ongoing regarding VT/CAD to determine options for interventions.

## 2023-11-09 NOTE — PROGRESS NOTE ADULT - SUBJECTIVE AND OBJECTIVE BOX
Mohsin Khan, MD  Attending Physician, Division Of Hospital Medicine  Pager: [phone], Office: [phone]  Off hour pager: [phone]    Patient is a 58y old  Male who presents with a chief complaint of NSTEMI     SUBJECTIVE / OVERNIGHT EVENTS:  Seen, examined the patient this afternoon, wife at bedside  Feels ok, weak and tired, no c/o chest pain, mild SOB, afebrile, /68, O2 98% on 2L    Transferred from CCU to [facility] as he remains hemodynamically stable    MEDICATIONS  (STANDING):  aMIOdarone    Tablet   Oral   aMIOdarone    Tablet 400 milliGRAM(s) Oral every 8 hours  aspirin enteric coated 81 milliGRAM(s) Oral daily  atorvastatin 80 milliGRAM(s) Oral at bedtime  buMETAnide Injectable 2 milliGRAM(s) IV Push two times a day  heparin  Infusion 1100 Unit(s)/Hr (8 mL/Hr) IV Continuous <Continuous>  hydrALAZINE 75 milliGRAM(s) Oral every 8 hours  insulin glargine Injectable (LANTUS) 15 Unit(s) SubCutaneous at bedtime  insulin lispro (ADMELOG) corrective regimen sliding scale   SubCutaneous three times a day before meals  insulin lispro (ADMELOG) corrective regimen sliding scale   SubCutaneous at bedtime  insulin lispro Injectable (ADMELOG) 6 Unit(s) SubCutaneous three times a day before meals  isosorbide   dinitrate Tablet (ISORDIL) 30 milliGRAM(s) Oral three times a day  metoprolol succinate ER 25 milliGRAM(s) Oral daily  pantoprazole    Tablet 40 milliGRAM(s) Oral before breakfast  senna 2 Tablet(s) Oral at bedtime  tamsulosin 0.4 milliGRAM(s) Oral at bedtime  warfarin 5 milliGRAM(s) Oral once    MEDICATIONS  (PRN):  polyethylene glycol 3350 17 Gram(s) Oral daily PRN Constipation      Vital Signs Last 24 Hrs  T(C): 36.3 (09 Nov 2023 14:03), Max: 36.6 (09 Nov 2023 06:00)  T(F): 97.4 (09 Nov 2023 14:03), Max: 97.8 (09 Nov 2023 06:00)  HR: 105 (09 Nov 2023 14:03) (101 - 112)  BP: 104/68 (09 Nov 2023 14:03) (93/55 - 132/82)  BP(mean): 72 (09 Nov 2023 13:00) (67 - 102)  RR: 18 (09 Nov 2023 14:03) (11 - 36)  SpO2: 95% (09 Nov 2023 14:03) (87% - 97%)    Parameters below as of 09 Nov 2023 14:03  Patient On (Oxygen Delivery Method): nasal cannula  O2 Flow (L/min): 2    CAPILLARY BLOOD GLUCOSE      POCT Blood Glucose.: 172 mg/dL (09 Nov 2023 11:37)  POCT Blood Glucose.: 192 mg/dL (09 Nov 2023 07:46)  POCT Blood Glucose.: 195 mg/dL (09 Nov 2023 06:19)  POCT Blood Glucose.: 174 mg/dL (09 Nov 2023 03:09)  POCT Blood Glucose.: 262 mg/dL (08 Nov 2023 21:52)  POCT Blood Glucose.: 167 mg/dL (08 Nov 2023 19:23)  POCT Blood Glucose.: 136 mg/dL (08 Nov 2023 18:17)  POCT Blood Glucose.: 127 mg/dL (08 Nov 2023 17:25)  POCT Blood Glucose.: 37 mg/dL (08 Nov 2023 17:02)  POCT Blood Glucose.: 41 mg/dL (08 Nov 2023 17:01)    I&O's Summary    08 Nov 2023 07:01  -  09 Nov 2023 07:00  --------------------------------------------------------  IN: 892 mL / OUT: 800 mL / NET: 92 mL    09 Nov 2023 07:01  -  09 Nov 2023 16:20  --------------------------------------------------------  IN: 220 mL / OUT: 100 mL / NET: 120 mL        PHYSICAL EXAM:-  GENERAL: NAD, well-developed  EYES: EOMI, PERRLA, conjunctiva and sclera clear  NECK: Supple, No JVD, no thyromegaly  CHEST/LUNG: Clear to auscultation bilaterally; No wheeze  HEART: Regular rate and rhythm; S1, S2 audible, No murmurs, rubs, or gallops  ABDOMEN: Soft, Nontender, Nondistended; Bowel sounds present  EXTREMITIES:  2+ Peripheral Pulses, No clubbing, cyanosis, or edema  NEURO: AAOx3, no focal deficit      LABS:                        10.8   7.74  )-----------( 240      ( 09 Nov 2023 03:52 )             36.1     11-09    136  |  101  |  44<H>  ----------------------------<  172<H>  4.6   |  20<L>  |  2.12<H>    Ca    9.0      09 Nov 2023 03:52  Phos  4.6     11-09  Mg     2.2     11-09    TPro  6.3  /  Alb  3.3  /  TBili  0.4  /  DBili  x   /  AST  18  /  ALT  38  /  AlkPhos  97  11-09    PT/INR - ( 09 Nov 2023 03:52 )   PT: 19.5 sec;   INR: 1.89 ratio         PTT - ( 09 Nov 2023 03:52 )  PTT:55.9 sec  CARDIAC MARKERS ( 07 Nov 2023 23:17 )  x     / x     / 49 U/L / x     / 3.4 ng/mL      Urinalysis Basic - ( 09 Nov 2023 03:52 )    Color: x / Appearance: x / SG: x / pH: x  Gluc: 172 mg/dL / Ketone: x  / Bili: x / Urobili: x   Blood: x / Protein: x / Nitrite: x   Leuk Esterase: x / RBC: x / WBC x   Sq Epi: x / Non Sq Epi: x / Bacteria: x        RADIOLOGY & ADDITIONAL TESTS:    Imaging Personally Reviewed: CXR, Cath, echo  Consultant(s) Notes Reviewed: Card, Pulmonary, Neuro

## 2023-11-09 NOTE — PROGRESS NOTE ADULT - PROBLEM SELECTOR PLAN 1
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  - s/p LHC-> 95% discrete proximal LAD disease and sequential multifocal disease with good distal target, 80-90% proximal LCx disease just prior to marginals, severe multifocal RCA. Limited viability on MRI  - Echo EF <20%, rWMA & LV thrombus  - HF team & EP card f/u plans noted- c/w GDMT-   Metoprolol ER 25mg/d, ARB/ARNI- none due to LAURA, No MRA, eventual SGLT2i  - Bumex 2mg IV bid and Hydral 75mg tid and ISDN 30mg tid  - Inotropes: Off milrinone since 11/1  - Advanced therapies- holding off on launching VAD/transplant eval given cognitive issues   - May consider PCI / Ablation and AT eval if pt amenable and felt to be a good candidate   - ordered MI for r/o PAD  - PT in progress  - daily weight and I/O

## 2023-11-09 NOTE — CONSULT NOTE ADULT - ASSESSMENT
58M PMHx of CVA with mild Left sided deficits, HTN, DM2, vertigo, HLD, Mobitz II s/p Medtronic dual chamber ICD (12/21/22) initially presented to OSH for SOB c/f ADHF vs. PNA, later found to have NSTEMI transferred to [facility] for LHC evaluation. s/p LHC on 10/16 w/ 3v disease, RHC on 10/19 for hemodynamic assessment, cMRI w/ limited viability in LAD territory and TTE 10/28 w/ EF<20% and apical thrombus. Admitted to CICU x2 for cardiogenic shock and x1 for VT. Palliative consulted for GOC. You can access the FollowMyHealth Patient Portal offered by [facility] by registering at the following website: [email or web address]. By joining [facility]’s FollowMyHealth portal, you will also be able to view your health information using other applications (apps) compatible with our system.

## 2023-11-09 NOTE — PROGRESS NOTE ADULT - PROBLEM SELECTOR PLAN 3
Likely due to acute systolic HF, Treated for PNA at [facility]  - appreciated Pulmonary plans- O2, diuretic, incentive spirometer  - no plans for thoracentesis  - outpatient f/u for 6mm Pulmonary nodule

## 2023-11-09 NOTE — PROGRESS NOTE ADULT - PROBLEM SELECTOR PLAN 6
A1C 8.4%  - Has been on Lantus 15u qhs, Admelog 6u AC and SS A1C 8.4%  - Has been on Lantus 15u qhs, Admelog 6u AC and SS  - CC diet

## 2023-11-09 NOTE — PROGRESS NOTE ADULT - ATTENDING COMMENTS
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 Had CVA at that time.  Admitted now with NSTEMI. Found to have high grade LAD stenosis and severe RCA disease.  Viability study via CMRI showed >50% LGE in LAD territory. + LV thrombus.   During this stay had a new CVA in PICA territory with mild hemorrhagic transformation as well as embolic appearing parietal stroke.   Plans for revascularization were halted due to this. He has been diuresed and started on low dose GDMT.    On exam he appears dry to me with low JVP, no peripheral edema and systemic hypotension.     *On 11/6 after my evaluation a rapid was called due to dizziness and hypotension. A CT was repeated showing occlusion of the non dominant vertebral artery and high grade stenosis of the dominant. He received contrast for this study. Symptoms were deemed to be due to hypotension.    On 11/7 transferred to CICU for sustained VT that was self limited   On 11/8 while receiving PO Amiodarone load he developed a new episode of MMVT  These episodes are in his monitor zone and were not treated    Main issues:   1. Severe cerebrovascular disease - mainly vertebral arteries  2. MVCAD - not amenable for revasc (>50% LGE in anterolateral wall)  3. LV thrombus - smaller after a/c but still present   4. MMVT - EP following on Amiodarone ?amenable for ablation if LVT resolves   5. HFrEF - tolerating minimal GDMT   6. Abnormal CT chest - possibly related to pulmonary edema   7. LAURA on CKD    - Diurese with Bumex 2 mg twice daily for one day and then daily starting tomorrow 11/10/23  - Amiodarone per EP   - Toprol XL 25 mg   - Continue heparin gtt and ASA (INR 1.6) for LVT  - Hydralazine 75 and Isordil 30   - Continue Digoxin (Level 1.3 on 11/3)  - Raad Cr was ~1.6-1.8  - MRI per Neuro IR to evaluate vertebral circulation  - PT evaluation  - Family is eager to go home, had long discussion about current scenario - needs a final plan on revasc vs. not will discuss with BENNIE Spring MD

## 2023-11-09 NOTE — PROGRESS NOTE ADULT - ASSESSMENT
57 YO M with a history of CVA with residual mild R paresthesias, second degree Mobitz II heart block s/p DC-ICD 12/2022 (initial concern for sarcoid but negative biopsy/PET post procedure), DM2 (A1c 8.4%), and HTN who was admitted to [facility] with chest pain and dyspnea, found to have NSTEMI with markedly elevated troponins and new severe LV dysfunction with regional wall motion abnormalities as well as + enterovirus. He required NIPPV and was diuresed before being transferred to [facility] where LHC performed which revealed severe 3v CAD with critical proximal LAD involvement. CTS was consulted for CABG evaluation and HF consulted for comanagement.    He ultimately underwent RHC with revealed elevated wedge but normal cardiac output. He was transferred to CICU for swan placement and closer observation of hemodynamics. Found to have LV thrombus. Given concern for low flow status thus sent back to CCU 10/28. He is confused and was found to have R medial cerebellar infarct on head CT, with MRI showing evolving PICA stroke. Given evolving stroke, brain lesion and ongoing confusion he's not currently a candidate for coronary intervention. Additionally, per CTS review, no LAD viability. He's tolerated milrinone being weaned off as of 11/1 with stable end organ function and is tolerating escalation of vasodilators with room for further escalation. His CXR shows ongoing pulmonary vascular congestion. He's volume overloaded on exam with dilated, non collapsable IVC on bedside POCUS. Tachycardia has been improving off milrinone.    REVIEW OF STUDIES  TTE 10/26: EF <20% LVT present, small pericardial effusion w/o tamponade  RHC 10/19: RA 10, RV 47/9/13, Wedge 29, PA 41/26/33, CO/CI 4.4/2.3, PA sat 57.3  EKG: sinus tachycardia, septal q-waves, left axis deviation   TTE 10/16/23: LV 5.5 cm, LVEF 20% with regional WMAs worse in the apex, LVOT VTI 8 cm, normal RV size/function, severe functional MR, small pericardial effusion, estimated RA pressure 8 mmHg   TTE 12/2022: normal LVEF   LHC: 95% discrete proximal LAD disease and sequential multifocal disease with good distal target, 80-90% proximal LCx disease just prior to marginals, severe multifocal RCA disease with good targets   CMR: There is greater than 50% thickness subendocardial LGE within the mid anteroseptum, anterior and anterolateral segments and apical segments.    Hemodynamics:  11/1/23 CVP 13, PA 48/23/35, mVO2 62.6%, Cris CI 2.4  10/21/23: RA 13 with V-wave 21, PA 50/33, PCW 33, MvO2 68.2, Cris CO/CI 4.4/2.56 57 YO M with a history of CVA with residual mild R paresthesias, second degree Mobitz II heart block s/p DC-ICD 12/2022 (initial concern for sarcoid but negative biopsy/PET post procedure), DM2 (A1c 8.4%), and HTN who was admitted to [facility] with chest pain and dyspnea, found to have NSTEMI with markedly elevated troponins and new severe LV dysfunction with regional wall motion abnormalities as well as + enterovirus. He required NIPPV and was diuresed before being transferred to [facility] where LHC performed which revealed severe 3v CAD with critical proximal LAD involvement. CTS was consulted for CABG evaluation and HF consulted for comanagement.    He ultimately underwent RHC with revealed elevated wedge but normal cardiac output. He was transferred to CICU for swan placement and closer observation of hemodynamics. Found to have LV thrombus. Given concern for low flow status thus sent back to CCU 10/28. He is confused and was found to have R medial cerebellar infarct on head CT, with MRI showing evolving PICA stroke. Given evolving stroke, brain lesion and ongoing confusion he's not currently a candidate for coronary intervention. Additionally, per CTS review, no LAD viability. He's tolerated milrinone being weaned off as of 11/1 with stable end organ function and is tolerating escalation of vasodilators with room for further escalation. His CXR shows ongoing pulmonary vascular congestion. He's volume overloaded on exam with dilated, non collapsable IVC on bedside POCUS. Tachycardia has been improving off milrinone.    REVIEW OF STUDIES  TTE 10/26: EF <20% LVT present, small pericardial effusion w/o tamponade  RHC 10/19: RA 10, RV 47/9/13, Wedge 29, PA 41/26/33, CO/CI 4.4/2.3, PA sat 57.3  EKG: sinus tachycardia, septal q-waves, left axis deviation   TTE 10/16/23: LV 5.5 cm, LVEF 20% with regional WMAs worse in the apex, LVOT VTI 8 cm, normal RV size/function, severe functional MR, small pericardial effusion, estimated RA pressure 8 mmHg   TTE 12/2022: normal LVEF   LHC: 95% discrete proximal LAD disease and sequential multifocal disease with good distal target, 80-90% proximal LCx disease just prior to marginals, severe multifocal RCA disease with good targets   CMR: There is greater than 50% thickness subendocardial LGE within the mid anteroseptum, anterior and anterolateral segments and apical segments.    Hemodynamics:  10/30 RHC: RHC: RA 14/13/12, RV 51/14, PA 47/34/39, PCW 34/36/32, CI 2.5/CO4.95   11/1/23 CVP 13, PA 48/23/35, mVO2 62.6%, Cris CI 2.4  10/21/23: RA 13 with V-wave 21, PA 50/33, PCW 33, MvO2 68.2, Cris CO/CI 4.4/2.56

## 2023-11-09 NOTE — CONSULT NOTE ADULT - PROBLEM SELECTOR RECOMMENDATION 4
- baseline creatinine appears 1.4-1.5  - diuretics as stated above  - trend daily
continue toprol  diuresis
neurology note appreciated
normal...

## 2023-11-09 NOTE — PROGRESS NOTE ADULT - ASSESSMENT
57 y/o M PMH CVA with mild left sided deficits, HTN, DM2, vertigo, HLD, Mobitz II s/p Medtronic dual chamber ICD (12/21/22) initially presented to OSH for SOB c/f ADHF vs. PNA, later found to have NSTEMI transferred to [facility] for LHC evaluation. 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 Readmitted to CICU 11/8 for sustained MMVT on the floor, ~150bpm falling into monitor zone w/o therapy delivered and spontaneously terminated. He was started on Amiodarone load on 11/8. On 11/8, he continued to have frequent MM nonsustained VT, and also sustained MMVT @ rate ~150bpm w/ spontaneous termination.    Recommendations:  Initiate Amio load 400mg q8h to 10gm followed by 200mg qd thereafter  While on Amio, needs monitoring of LFT's and TFT's. Will need monitoring of PFT's and yearly opthalmologic eval as outpatient as well   Avoid Digoxin   Continue tele monitoring   Replete lytes for K>4 and Mg>2. 57 y/o M PMH CVA with mild left sided deficits, HTN, DM2, vertigo, HLD, Mobitz II s/p Medtronic dual chamber primary prevention ICD (12/21/22) initially presented to OSH for SOB c/f ADHF vs. PNA, later found to have NSTEMI transferred to [facility] for LHC evaluation. 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 Readmitted to CICU 11/8 for sustained MMVT on the floor, ~150bpm falling into monitor zone w/o therapy delivered and spontaneously terminated. He was started on Amiodarone load on 11/8. On 11/8, he continued to have frequent MM nonsustained VT, and also sustained MMVT @ rate ~150bpm w/ spontaneous termination.    1) HFrEF   2) CAD  3) VT    Recommendations:  -Initiate Amio load 400mg q8h to 10gm followed by 200mg qd thereafter. Patient has received 1200mg so far. Will re-evaluate arrhythmia burden once fully loaded with Amio for further recs   -While on Amio, needs monitoring of LFT's and TFT's. Will need monitoring of PFT's and yearly opthalmologic eval as outpatient as well   -HF following, currently holding off on launching VAD/transplant eval given cognitive issues  -HF to discuss w/ IC regarding PCI with MCS back up as possible BTT/BTVad  -Will re-eval ablation pending continued Amio load and decision re: revascularization/AT  -Continue tele monitoring   -Replete lytes for K>4 and Mg>2

## 2023-11-09 NOTE — PROGRESS NOTE ADULT - ASSESSMENT
58M PMHx of CVA with mild Left sided deficits, HTN, DM2, vertigo, HLD, Mobitz II s/p Medtronic dual chamber ICD (12/21/22) initially presented to OSH for SOB c/f ADHF vs. PNA, later found to have NSTEMI transferred to [facility] for LHC evaluation. s/p LHC on 10/16 w/ 3v disease, RHC on 10/19 for hemodynamic assessment, cMRI w/ limited viability in LAD territory and TTE 10/28 w/ EF<20% and apical thrombus. Admitted to CICU x2 for cardiogenic shock and x1 for VT.    Plan:  ====================== NEUROLOGY=====================  A&Ox3  - continue to monitor mental status as per protocol    L parietal and R PICA CVA  - L Parietal and R PICA infarct with acute encephalopathy secondary to cardio embolic stroke  - MR head 10/30 w/ R PICA infarct and L parietal infarct, likely due to embolic shower, per neuro. Very mild hemorrhagic transformation in R PICA distribution   - MR neck showing occluded R-sided intracranial vertebral artery of unknown timeframe and mild focal stenosis of the R supraclinoid intracranial internal carotid artery secondary to atherosclerosis  - neurology following  - neuro checks  - Repeat CTH for possible PCI planning    ==================== RESPIRATORY======================  Acute hypoxic respiratory failure  - stable on 2L NC, wean supplemental O2 as tolerated  - continue to monitor SpO2 with goal >94%    ====================CARDIOVASCULAR==================  Acute on chronic HFrEF exacerabation   - trend lactate  - bumex 2 mg PO QD for diuresis  - hydral 50 BID and isordil 30 TID for afterload reduction   - was on digoxin, c/f possible digoxin toxicity. Dig Level 1.2. Hold off on digoxin at this time.   - Pending further recs from HF team   - strict Is/Os, daily weights  - Start Bumex 2 IVP BID for diuresis  - HF holding off on launching VAD/transplant eval at this time given cognitive issues    VT  - consider amio vs digoxin  - Started amio load for total of 10grams  - Episode of VT on 11/08 coincided with hypoglycemia  - f/u EP reccs    AFib  - rates in 110s  - consider restarting amio vs dig   - c/w heparin gtt for full systemic AC    LV thrombus  - c/w heparin gtt  - Start heparin to coumadin bridge    Multivessel CAD  - c/w ASA, Lipitor   - metoprolol 25 qd  - Possible PCI planning    ===================HEMATOLOGIC/ONC ===================  H/H & plts stable  - Monitor H/H and plts  - DVT PPX: heparin gtt     ===================== RENAL =========================  LAURA  - monitor Cr  - Continue monitoring urine output, lytes, SCr/ BUN  - replete lytes prn with goal K >4 and Mg >2    BPH  - c/w flomax .4    ==================== GASTROINTESTINAL===================  DASH diet  Miralax/Senna PRN   PPI     =======================    ENDOCRINE  =====================  DM  - A1c on admission 8.4%  - ISS  - Lantus 30  - premeal 12  - monitor FS    ========================INFECTIOUS DISEASE================  ?PNA  - sputum cx sent w/ GNR, f/u speciation  - c/w Zosyn x48 hrs until speciation  - D/c Zosyn no growth on sputum cultures  - afebrile, no leukocytosis  - monitor and trend WBC and temperature curve     GOC: Palliative consulted due to poor prognosis with multiple CICU admissions. Patient currently is not a candidate for interventions 58M PMHx of CVA with mild Left sided deficits, HTN, DM2, vertigo, HLD, Mobitz II s/p Medtronic dual chamber ICD (12/21/22) initially presented to OSH for SOB c/f ADHF vs. PNA, later found to have NSTEMI transferred to [facility] for LHC evaluation. s/p LHC on 10/16 w/ 3v disease, RHC on 10/19 for hemodynamic assessment, cMRI w/ limited viability in LAD territory and TTE 10/28 w/ EF<20% and apical thrombus. Admitted to CICU x2 for cardiogenic shock and x1 for VT.    Plan:  ====================== NEUROLOGY=====================  A&Ox3  - continue to monitor mental status as per protocol    L parietal and R PICA CVA  - L Parietal and R PICA infarct with acute encephalopathy secondary to cardio embolic stroke  - MR head 10/30 w/ R PICA infarct and L parietal infarct, likely due to embolic shower, per neuro. Very mild hemorrhagic transformation in R PICA distribution   - MR neck showing occluded R-sided intracranial vertebral artery of unknown timeframe and mild focal stenosis of the R supraclinoid intracranial internal carotid artery secondary to atherosclerosis  - neurology following  - neuro checks  - Repeat CTH for possible PCI planning: stable    ==================== RESPIRATORY======================  Acute hypoxic respiratory failure  - stable on 2L NC, wean supplemental O2 as tolerated  - continue to monitor SpO2 with goal >94%    ====================CARDIOVASCULAR==================  Acute on chronic HFrEF exacerabation   - trend lactate  - bumex 2 mg PO QD for diuresis  - hydral 50 BID and isordil 30 TID for afterload reduction   - was on digoxin, c/f possible digoxin toxicity. Dig Level 1.2. Hold off on digoxin at this time.   - Pending further recs from HF team   - strict Is/Os, daily weights  - Patient has had episodes of incontinence which make accurate I&Os difficult.  - Start Bumex 2 IVP BID for diuresis  - Consider metolazone vs diuril if diuresis is inadequate.  - HF holding off on launching VAD/transplant eval at this time given cognitive issues    VT  - consider amio vs digoxin  - Started amio load for total of 10grams  - Episode of VT on 11/08 coincided with hypoglycemia  - f/u EP reccs    AFib  - rates in 110s  - consider restarting amio vs dig   - c/w heparin to coumadin bridge for full systemic AC    LV thrombus  - c/w heparin gtt  - Start heparin to coumadin bridge    Multivessel CAD  - c/w ASA, Lipitor   - metoprolol 25 qd  - Was deemed not a candidate for intervention    ===================HEMATOLOGIC/ONC ===================  H/H & plts stable  - Monitor H/H and plts  - DVT PPX: heparin gtt     ===================== RENAL =========================  LAUAR  - monitor Cr  - Continue monitoring urine output, lytes, SCr/ BUN  - replete lytes prn with goal K >4 and Mg >2    BPH  - c/w flomax .4    ==================== GASTROINTESTINAL===================  DASH diet  Miralax/Senna PRN   PPI     =======================    ENDOCRINE  =====================  DM  - A1c on admission 8.4%  - ISS  - Lantus 30  - premeal 12  - monitor FS    ========================INFECTIOUS DISEASE================  ?PNA  - sputum cx sent w/ GNR, f/u speciation  - c/w Zosyn x48 hrs until speciation  - D/c Zosyn no growth on sputum cultures  - afebrile, no leukocytosis  - monitor and trend WBC and temperature curve     GOC: Palliative consulted due to poor prognosis with multiple CICU admissions. Patient currently is not a candidate for interventions

## 2023-11-09 NOTE — CONSULT NOTE ADULT - PROBLEM SELECTOR RECOMMENDATION 9
- new onset of HFrEF with EF 20%, LVDD 5.5 cm and severe MR  - continue Toprol XL 25 mg PO QD   - plan to start hydralazine 10 mg PO TID for after load reduction  - reduce bumex 1 mg PO QD (appears near euvolemic on exam)  - Device: currently has dual chamber Medtronic ICD in place. Of note at the time of implant had normal EF however there was concern for sarcoidosis which has been ruled out  - Device interrogation revealed 91.3% Vpacing. May benefit from epicardial lead placement at the time of OR so device can be upgraded to CRT-D
in setting of acute illness/CVA
obtain CHF consult  viability study   TTE  diuresis

## 2023-11-09 NOTE — CONSULT NOTE ADULT - SUBJECTIVE AND OBJECTIVE BOX
Date of Service: 11-09-23 @ 11:48    HPI:  Patient with separate chart from [facility], MRN# [id number]    57 yo Male pmhx CVA with mild left sided deficits, HTN, DM2, vertigo, HLD, Mobitz II s/pp Medtronic dual chamber ICD (12/21/22) initially presented to [facility] from home with complaints of dyspnea and chest pain and was admitted w/ acute hypoxic respiratory failure likely due to acute pulmonary edema due to acute HFrEF in setting of acute NSTEMI, LAURA and enterovirus infection. Pt with brief MICU stay at [facility] requiring bipap (no intubation), was treated for PNA with cefepime, and was found to have TTE done 9/26/23 showing EF 20% with severely decreased LVSF, multiple regional wall motion abnormalities, and elevated LV end diastolic pressure. Pt was transferred to [facility] for cardiac cath evaluation. Patient without any acute complaints, complaining of intermittent palpitations for the last 2 days. No chest pain, SOB, fevers, nausea, vomiting, abdominal pain.  (13 Oct 2023 21:05)    PERTINENT PM/SXH:   CVA (cerebrovascular accident)    T2DM (type 2 diabetes mellitus)    HTN (hypertension)    HLD (hyperlipidemia)    S/P cystoscopy    S/P hernia repair    FAMILY HISTORY: Unable to obtain history due to cognitive impairment   ITEMS NOT CHECKED ARE NOT PRESENT    SOCIAL HISTORY:   Significant other/partner[ ]  Children[x]  Confucianism/Spirituality:  Substance hx:  [ ]   Tobacco hx:  [ ]   Alcohol hx: [ ]   Home Opioid hx:  [ ] I-Stop Reference No:  Living Situation: [x]Home  [ ]Long term care  [ ]Rehab [ ]Other    ADVANCE DIRECTIVES:    DNR/MOLST  [ ]  Living Will  [ ]   DECISION MAKER(s):  [ ] Health Care Proxy(s)  [x ] Surrogate(s)  [ ] Guardian           Name(s): Phone Number(s):  Mame Marquez [phone]    BASELINE (I)ADL(s) (prior to admission):  [place]: [x ]Total  [ ] Moderate [ ]Dependent    Allergies    No Known Allergies    Intolerances    MEDICATIONS  (STANDING):  aMIOdarone    Tablet   Oral   aMIOdarone    Tablet 400 milliGRAM(s) Oral every 8 hours  aspirin enteric coated 81 milliGRAM(s) Oral daily  atorvastatin 80 milliGRAM(s) Oral at bedtime  buMETAnide Injectable 2 milliGRAM(s) IV Push two times a day  chlorhexidine 2% Cloths 1 Application(s) Topical <User Schedule>  heparin  Infusion 1100 Unit(s)/Hr (8 mL/Hr) IV Continuous <Continuous>  hydrALAZINE 75 milliGRAM(s) Oral every 8 hours  insulin glargine Injectable (LANTUS) 15 Unit(s) SubCutaneous at bedtime  insulin lispro (ADMELOG) corrective regimen sliding scale   SubCutaneous at bedtime  insulin lispro (ADMELOG) corrective regimen sliding scale   SubCutaneous three times a day before meals  insulin lispro Injectable (ADMELOG) 6 Unit(s) SubCutaneous three times a day before meals  isosorbide   dinitrate Tablet (ISORDIL) 30 milliGRAM(s) Oral three times a day  metoprolol succinate ER 25 milliGRAM(s) Oral daily  pantoprazole    Tablet 40 milliGRAM(s) Oral before breakfast  senna 2 Tablet(s) Oral at bedtime  tamsulosin 0.4 milliGRAM(s) Oral at bedtime  warfarin 5 milliGRAM(s) Oral once    MEDICATIONS  (PRN):  polyethylene glycol 3350 17 Gram(s) Oral daily PRN Constipation    PRESENT SYMPTOMS: [ ]Unable to self-report see CPOT, PAINADs, RDOS  Source if other than patient:  [ ]Family   [ ]Team     Pain: [ ]yes [x ]no  QOL impact -   Location -                    Aggravating factors -  Quality -  Radiation -  Timing-  Severity (0-10 scale):  Minimal acceptable level (0-10 scale):       Dyspnea:                           [ ]Mild [ ]Moderate [ ]Severe  Anxiety:                             [ ]Mild [ ]Moderate [ ]Severe  Fatigue:                             [ ]Mild [ ]Moderate [ ]Severe  Nausea:                             [ ]Mild [ ]Moderate [ ]Severe  Loss of appetite:              [ ]Mild [ ]Moderate [ ]Severe  Constipation:                    [ ]Mild [ ]Moderate [ ]Severe    PCSSQ [Palliative Care Spiritual Screening Question]   Severity (0-10):  Score of 4 or > indicate consideration of Chaplaincy referral.  Chaplaincy Referral: [ ] yes [ ] refused [ ] following [x] deferred    Caregiver [place]? : [ ] yes [ ] no [x ] deferred:  Social work referral [ ] Patient & Family Centered Care Referral [ ]     Anticipatory Grief Present?: [ ] yes [ ] no  [ x] deferred: Palliative Social work referral [ ]  Patient & Family Centered Care Referral [ ]       Other Symptoms:  [x ]All other review of systems negative   [ ] Unable to obtain due to poor mentation    PHYSICAL EXAM:  Vital Signs Last 24 Hrs  T(C): 36.6 (09 Nov 2023 11:00), Max: 36.7 (08 Nov 2023 15:00)  T(F): 97.8 (09 Nov 2023 11:00), Max: 98.1 (08 Nov 2023 15:00)  HR: 102 (09 Nov 2023 11:00) (101 - 115)  BP: 105/70 (09 Nov 2023 11:00) (93/55 - 132/82)  BP(mean): 84 (09 Nov 2023 11:00) (67 - 102)  RR: 23 (09 Nov 2023 11:00) (11 - 36)  SpO2: 96% (09 Nov 2023 11:00) (87% - 99%)    Parameters below as of 09 Nov 2023 09:54    O2 Flow (L/min): 0   I&O's Summary    08 Nov 2023 07:01  -  09 Nov 2023 07:00  --------------------------------------------------------  IN: 892 mL / OUT: 800 mL / NET: 92 mL    09 Nov 2023 07:01  -  09 Nov 2023 11:48  --------------------------------------------------------  IN: 212 mL / OUT: 100 mL / NET: 112 mL        GENERAL:  [x]Alert  [x]Oriented x 3  [ ]Lethargic  [ ]Cachexia  [ ]Unarousable  [x]Verbal  [ ]Non-Verbal  Behavioral:   [ ]Anxiety  [ ]Delirium [ ]Agitation [ ]Other  HEENT:  [x]Normal   [ ]Dry mouth   [ ]ET Tube/Trach  [ ]Oral lesions  PULMONARY:   [x]Clear [ ]Tachypnea  [ ]Audible excessive secretions   [ ]Rhonchi        [ ]Right [ ]Left [ ]Bilateral  [ ]Crackles        [ ]Right [ ]Left [ ]Bilateral  [ ]Wheezing     [ ]Right [ ]Left [ ]Bilateral  [ ]Diminished BS [ ] Right [ ]Left [ ]Bilateral  CARDIOVASCULAR:    [x]Regular [ ]Irregular [ ]Tachy  [ ]Kush [ ]Murmur [ ]Other  GASTROINTESTINAL:  [x]Soft  [ ]Distended   [x]+BS  [x]Non tender [ ]Tender  [ ]PEG [ ]OGT/ NGT   Last BM:    GENITOURINARY:  [x]Normal [ ]Incontinent   [ ]Oliguria/Anuria   [ ]Douglass  MUSCULOSKELETAL:   [ ]Normal   [x]Weakness  [ ]Bed/Wheelchair bound [ ]Edema  NEUROLOGIC:   [x]No focal deficits  [ ] Cognitive impairment  [ ] Dysphagia [ ]Dysarthria [ ] Paresis [ ]Other   SKIN:   [x]Normal  [ ]Rash   [ ]Pressure ulcer(s) [ ]y [ ]n present on admission    CRITICAL CARE:  [ ] Shock Present  [ ]Septic [ ]Cardiogenic [ ]Neurologic [ ]Hypovolemic  [ ]  Vasopressors [ ]  Inotropes   [ ]Respiratory failure present [ ]Mechanical ventilation [ ]Non-invasive ventilatory support [ ]High flow    [ ]Acute  [ ]Chronic [ ]Hypoxic  [ ]Hypercarbic [ ]Other  [ ]Other organ failure     LABS:                        10.8   7.74  )-----------( 240      ( 09 Nov 2023 03:52 )             36.1   11-09    136  |  101  |  44<H>  ----------------------------<  172<H>  4.6   |  20<L>  |  2.12<H>    Ca    9.0      09 Nov 2023 03:52  Phos  4.6     11-09  Mg     2.2     11-09    TPro  6.3  /  Alb  3.3  /  TBili  0.4  /  DBili  x   /  AST  18  /  ALT  38  /  AlkPhos  97  11-09  PT/INR - ( 09 Nov 2023 03:52 )   PT: 19.5 sec;   INR: 1.89 ratio         PTT - ( 09 Nov 2023 03:52 )  PTT:55.9 sec    Urinalysis Basic - ( 09 Nov 2023 03:52 )    Color: x / Appearance: x / SG: x / pH: x  Gluc: 172 mg/dL / Ketone: x  / Bili: x / Urobili: x   Blood: x / Protein: x / Nitrite: x   Leuk Esterase: x / RBC: x / WBC x   Sq Epi: x / Non Sq Epi: x / Bacteria: x      RADIOLOGY & ADDITIONAL STUDIES:    PROTEIN CALORIE MALNUTRITION PRESENT: [ ]mild [ ]moderate [ ]severe [ ]underweight [ ]morbid obesity  https://www.andeal.org/vault/2440/web/files/ONC/Table_Clinical%20Characteristics%20to%20Document%20Malnutrition-White%20JV%20et%20al%202012.pdf    Height (cm): 185.4 (10-21-23 @ 12:00)  Weight (kg): 70 (10-17-23 @ 08:44)  BMI (kg/m2): 20.4 (10-21-23 @ 12:00)    [ ]PPSV2 < or = to 30% [ ]significant weight loss  [ ]poor nutritional intake  [ ]anasarca[ ]Artificial Nutrition      Other REFERRALS:  [ ]Hospice  [ ]Child Life  [ ]Social Work  [ ]Case management [ ]Holistic Therapy     Care Coordination Assessment 201 [C. Provider] (10-14-23 @ 15:57)      Palliative Performance Scale:  http://npcrc.org/files/news/palliative_performance_scale_ppsv2.pdf  (Ctrl +  left click to view)  Respiratory Distress Observation Tool:  https://homecareinformation.net/handouts/hen/Respiratory_Distress_Observation_Scale.pdf (Ctrl +  left click to view)  PAINAD Score:  http://geriatrictoolkit.[email or web address]/painad.pdf (Ctrl +  left click to view)   Date of Service: 11-09-23 @ 11:48    HPI:  Patient with separate chart from [facility], MRN# [id number]    59 yo Male pmhx CVA with mild left sided deficits, HTN, DM2, vertigo, HLD, Mobitz II s/pp Medtronic dual chamber ICD (12/21/22) initially presented to [facility] from home with complaints of dyspnea and chest pain and was admitted w/ acute hypoxic respiratory failure likely due to acute pulmonary edema due to acute HFrEF in setting of acute NSTEMI, LAURA and enterovirus infection. Pt with brief MICU stay at [facility] requiring bipap (no intubation), was treated for PNA with cefepime, and was found to have TTE done 9/26/23 showing EF 20% with severely decreased LVSF, multiple regional wall motion abnormalities, and elevated LV end diastolic pressure. Pt was transferred to [facility] for cardiac cath evaluation. Patient without any acute complaints, complaining of intermittent palpitations for the last 2 days. No chest pain, SOB, fevers, nausea, vomiting, abdominal pain.  (13 Oct 2023 21:05)    PERTINENT PM/SXH:   CVA (cerebrovascular accident)    T2DM (type 2 diabetes mellitus)    HTN (hypertension)    HLD (hyperlipidemia)    S/P cystoscopy    S/P hernia repair    FAMILY HISTORY: Unable to obtain history due to cognitive impairment   ITEMS NOT CHECKED ARE NOT PRESENT    SOCIAL HISTORY:   Significant other/partner[ ]  Children[x]  Temple/Spirituality:  Substance hx:  [ ]   Tobacco hx:  [ ]   Alcohol hx: [ ]   Home Opioid hx:  [ ] I-Stop Reference No:  Living Situation: [x]Home  [ ]Long term care  [ ]Rehab [ ]Other    ADVANCE DIRECTIVES:    DNR/MOLST  [ ]  Living Will  [ ]   DECISION MAKER(s):  [ ] Health Care Proxy(s)  [x ] Surrogate(s)  [ ] Guardian           Name(s): Phone Number(s):  Mame Marquez [phone]    BASELINE (I)ADL(s) (prior to admission):  [place]: [x ]Total  [ ] Moderate [ ]Dependent    Allergies    No Known Allergies    Intolerances    MEDICATIONS  (STANDING):  aMIOdarone    Tablet   Oral   aMIOdarone    Tablet 400 milliGRAM(s) Oral every 8 hours  aspirin enteric coated 81 milliGRAM(s) Oral daily  atorvastatin 80 milliGRAM(s) Oral at bedtime  buMETAnide Injectable 2 milliGRAM(s) IV Push two times a day  chlorhexidine 2% Cloths 1 Application(s) Topical <User Schedule>  heparin  Infusion 1100 Unit(s)/Hr (8 mL/Hr) IV Continuous <Continuous>  hydrALAZINE 75 milliGRAM(s) Oral every 8 hours  insulin glargine Injectable (LANTUS) 15 Unit(s) SubCutaneous at bedtime  insulin lispro (ADMELOG) corrective regimen sliding scale   SubCutaneous at bedtime  insulin lispro (ADMELOG) corrective regimen sliding scale   SubCutaneous three times a day before meals  insulin lispro Injectable (ADMELOG) 6 Unit(s) SubCutaneous three times a day before meals  isosorbide   dinitrate Tablet (ISORDIL) 30 milliGRAM(s) Oral three times a day  metoprolol succinate ER 25 milliGRAM(s) Oral daily  pantoprazole    Tablet 40 milliGRAM(s) Oral before breakfast  senna 2 Tablet(s) Oral at bedtime  tamsulosin 0.4 milliGRAM(s) Oral at bedtime  warfarin 5 milliGRAM(s) Oral once    MEDICATIONS  (PRN):  polyethylene glycol 3350 17 Gram(s) Oral daily PRN Constipation    PRESENT SYMPTOMS: [ ]Unable to self-report see CPOT, PAINADs, RDOS  Source if other than patient:  [ ]Family   [ ]Team     Pain: [ ]yes [x ]no  QOL impact -   Location -                    Aggravating factors -  Quality -  Radiation -  Timing-  Severity (0-10 scale):  Minimal acceptable level (0-10 scale):       Dyspnea:                           [ ]Mild [ ]Moderate [ ]Severe  Anxiety:                             [ ]Mild [ ]Moderate [ ]Severe  Fatigue:                             [ ]Mild [ ]Moderate [ ]Severe  Nausea:                             [ ]Mild [ ]Moderate [ ]Severe  Loss of appetite:              [ ]Mild [ ]Moderate [ ]Severe  Constipation:                    [ ]Mild [ ]Moderate [ ]Severe    PCSSQ [Palliative Care Spiritual Screening Question]   Severity (0-10):  Score of 4 or > indicate consideration of Chaplaincy referral.  Chaplaincy Referral: [ ] yes [ ] refused [ ] following [x] deferred    Caregiver [place]? : [ ] yes [ ] no [x ] deferred:  Social work referral [ ] Patient & Family Centered Care Referral [ ]     Anticipatory Grief Present?: [ ] yes [ ] no  [ x] deferred: Palliative Social work referral [ ]  Patient & Family Centered Care Referral [ ]       Other Symptoms:  [x ]All other review of systems negative   [ ] Unable to obtain due to poor mentation    PHYSICAL EXAM:  Vital Signs Last 24 Hrs  T(C): 36.6 (09 Nov 2023 11:00), Max: 36.7 (08 Nov 2023 15:00)  T(F): 97.8 (09 Nov 2023 11:00), Max: 98.1 (08 Nov 2023 15:00)  HR: 102 (09 Nov 2023 11:00) (101 - 115)  BP: 105/70 (09 Nov 2023 11:00) (93/55 - 132/82)  BP(mean): 84 (09 Nov 2023 11:00) (67 - 102)  RR: 23 (09 Nov 2023 11:00) (11 - 36)  SpO2: 96% (09 Nov 2023 11:00) (87% - 99%)    Parameters below as of 09 Nov 2023 09:54    O2 Flow (L/min): 0   I&O's Summary    08 Nov 2023 07:01  -  09 Nov 2023 07:00  --------------------------------------------------------  IN: 892 mL / OUT: 800 mL / NET: 92 mL    09 Nov 2023 07:01  -  09 Nov 2023 11:48  --------------------------------------------------------  IN: 212 mL / OUT: 100 mL / NET: 112 mL        GENERAL:  [x]Alert  [x]Oriented x 2 (person and place)  [ ]Lethargic  [ ]Cachexia  [ ]Unarousable  [x]Verbal  [ ]Non-Verbal  Behavioral:   [ ]Anxiety  [ ]Delirium [ ]Agitation [ ]Other  HEENT:  [x]Normal   [ ]Dry mouth   [ ]ET Tube/Trach  [ ]Oral lesions  PULMONARY:   [x]Clear [ ]Tachypnea  [ ]Audible excessive secretions   [ ]Rhonchi        [ ]Right [ ]Left [ ]Bilateral  [ ]Crackles        [ ]Right [ ]Left [ ]Bilateral  [ ]Wheezing     [ ]Right [ ]Left [ ]Bilateral  [ ]Diminished BS [ ] Right [ ]Left [ ]Bilateral  CARDIOVASCULAR:    [x]Regular [ ]Irregular [ ]Tachy  [ ]Kush [ ]Murmur [ ]Other  GASTROINTESTINAL:  [x]Soft  [ ]Distended   [x]+BS  [x]Non tender [ ]Tender  [ ]PEG [ ]OGT/ NGT   Last BM:    GENITOURINARY:  [x]Normal [ ]Incontinent   [ ]Oliguria/Anuria   [ ]Douglass  MUSCULOSKELETAL:   [ ]Normal   [x]Weakness  [ ]Bed/Wheelchair bound [ ]Edema  NEUROLOGIC:   [x]No focal deficits  [ ] Cognitive impairment  [ ] Dysphagia [ ]Dysarthria [ ] Paresis [ ]Other   SKIN:   [x]Normal  [ ]Rash   [ ]Pressure ulcer(s) [ ]y [ ]n present on admission    CRITICAL CARE:  [ ] Shock Present  [ ]Septic [ ]Cardiogenic [ ]Neurologic [ ]Hypovolemic  [ ]  Vasopressors [ ]  Inotropes   [ ]Respiratory failure present [ ]Mechanical ventilation [ ]Non-invasive ventilatory support [ ]High flow    [ ]Acute  [ ]Chronic [ ]Hypoxic  [ ]Hypercarbic [ ]Other  [ ]Other organ failure     LABS:                        10.8   7.74  )-----------( 240      ( 09 Nov 2023 03:52 )             36.1   11-09    136  |  101  |  44<H>  ----------------------------<  172<H>  4.6   |  20<L>  |  2.12<H>    Ca    9.0      09 Nov 2023 03:52  Phos  4.6     11-09  Mg     2.2     11-09    TPro  6.3  /  Alb  3.3  /  TBili  0.4  /  DBili  x   /  AST  18  /  ALT  38  /  AlkPhos  97  11-09  PT/INR - ( 09 Nov 2023 03:52 )   PT: 19.5 sec;   INR: 1.89 ratio         PTT - ( 09 Nov 2023 03:52 )  PTT:55.9 sec    Urinalysis Basic - ( 09 Nov 2023 03:52 )    Color: x / Appearance: x / SG: x / pH: x  Gluc: 172 mg/dL / Ketone: x  / Bili: x / Urobili: x   Blood: x / Protein: x / Nitrite: x   Leuk Esterase: x / RBC: x / WBC x   Sq Epi: x / Non Sq Epi: x / Bacteria: x      RADIOLOGY & ADDITIONAL STUDIES:  < from: CT Head No Cont (11.08.23 @ 15:13) >    ACC: [id number] EXAM:  CT BRAIN   ORDERED BY:  SALMA BAILEY     PROCEDURE DATE:  11/08/2023          INTERPRETATION:  Clinical indication: Old infarct    Multiple axial sections were performed from the base of vertex without   contrast enhancement. Coronal and sagittal reconstructions were performed    Parenchymal volume loss and chronic microvascular changes again seen and   unchanged when compared with the prior head CT performed on November 6, 2023    Abnormal low-attenuation involving the right cerebellar region is again   seen which is likely compatible with subacute to chronic infarct.    There is no acute hemorrhage mass or mass effect seen.    Evaluation of the osseous window appears unremarkable    Right maxillary polyp versus retention cyst is seen    Both mastoid and middle ear regions appear clear.    IMPRESSION: Stable exam.    --- End of Report ---            MO LARRY MD; Attending Radiologist  This document has been electronically signed. Nov 8 2023  3:30PM    < end of copied text >    PROTEIN CALORIE MALNUTRITION PRESENT: [ ]mild [ ]moderate [ ]severe [ ]underweight [ ]morbid obesity  https://www.andeal.org/vault/2440/web/files/ONC/Table_Clinical%20Characteristics%20to%20Document%20Malnutrition-White%20JV%20et%20al%202012.pdf    Height (cm): 185.4 (10-21-23 @ 12:00)  Weight (kg): 70 (10-17-23 @ 08:44)  BMI (kg/m2): 20.4 (10-21-23 @ 12:00)    [ ]PPSV2 < or = to 30% [ ]significant weight loss  [ ]poor nutritional intake  [ ]anasarca[ ]Artificial Nutrition      Other REFERRALS:  [ ]Hospice  [ ]Child Life  [ ]Social Work  [ ]Case management [ ]Holistic Therapy     Care Coordination Assessment 201 [C. Provider] (10-14-23 @ 15:57)      Palliative Performance Scale:  http://npcrc.org/files/news/palliative_performance_scale_ppsv2.pdf  (Ctrl +  left click to view)  Respiratory Distress Observation Tool:  https://homecareinformation.net/handouts/hen/Respiratory_Distress_Observation_Scale.pdf (Ctrl +  left click to view)  PAINAD Score:  http://[email or web address].[email or web address]/painad.pdf (Ctrl +  left click to view)

## 2023-11-09 NOTE — PROGRESS NOTE ADULT - SUBJECTIVE AND OBJECTIVE BOX
Patient is a 58y old  Male who presents with a chief complaint of NSTEMI (08 Nov 2023 19:14)    HPI:  Patient with separate chart from [facility], MRN# [id number]    59 yo Male pmhx CVA with mild left sided deficits, HTN, DM2, vertigo, HLD, Mobitz II s/pp Medtronic dual chamber ICD (12/21/22) initially presented to [facility] from home with complaints of dyspnea and chest pain and was admitted w/ acute hypoxic respiratory failure likely due to acute pulmonary edema due to acute HFrEF in setting of acute NSTEMI, LAURA and enterovirus infection. Pt with brief MICU stay at [facility] requiring bipap (no intubation), was treated for PNA with cefepime, and was found to have TTE done 9/26/23 showing EF 20% with severely decreased LVSF, multiple regional wall motion abnormalities, and elevated LV end diastolic pressure. Pt was transferred to [facility] for cardiac cath evaluation. Patient without any acute complaints, complaining of intermittent palpitations for the last 2 days. No chest pain, SOB, fevers, nausea, vomiting, abdominal pain.  (13 Oct 2023 21:05)       INTERVAL HPI/OVERNIGHT EVENTS:   Patient had an episode of VT that coincided with hypoglycemia to 37. Lantus was lowered from 30 to 15.    Subjective:    ICU Vital Signs Last 24 Hrs  T(C): 36.6 (09 Nov 2023 06:00), Max: 36.7 (08 Nov 2023 15:00)  T(F): 97.8 (09 Nov 2023 06:00), Max: 98.1 (08 Nov 2023 15:00)  HR: 106 (09 Nov 2023 07:00) (102 - 115)  BP: 102/62 (09 Nov 2023 07:00) (93/55 - 132/82)  BP(mean): 76 (09 Nov 2023 07:00) (67 - 102)  ABP: --  ABP(mean): --  RR: 20 (09 Nov 2023 07:00) (11 - 36)  SpO2: 95% (09 Nov 2023 07:00) (91% - 99%)    O2 Parameters below as of 09 Nov 2023 07:00  Patient On (Oxygen Delivery Method): nasal cannula  O2 Flow (L/min): 3        I&O's Summary    08 Nov 2023 07:01  -  09 Nov 2023 07:00  --------------------------------------------------------  IN: 892 mL / OUT: 800 mL / NET: 92 mL          Daily     Daily     Adult Advanced Hemodynamics Last 24 Hrs  CVP(mm Hg): --  CVP(cm H2O): --  CO: --  CI: --  PA: --  PA(mean): --  PCWP: --  SVR: --  SVRI: --  PVR: --  PVRI: --    EKG/Telemetry Events:    MEDICATIONS  (STANDING):  aMIOdarone    Tablet   Oral   aMIOdarone    Tablet 400 milliGRAM(s) Oral every 8 hours  aspirin enteric coated 81 milliGRAM(s) Oral daily  atorvastatin 80 milliGRAM(s) Oral at bedtime  buMETAnide Injectable 2 milliGRAM(s) IV Push two times a day  chlorhexidine 2% Cloths 1 Application(s) Topical <User Schedule>  heparin  Infusion 1100 Unit(s)/Hr (8 mL/Hr) IV Continuous <Continuous>  hydrALAZINE 75 milliGRAM(s) Oral every 8 hours  insulin glargine Injectable (LANTUS) 15 Unit(s) SubCutaneous at bedtime  insulin lispro (ADMELOG) corrective regimen sliding scale   SubCutaneous at bedtime  insulin lispro (ADMELOG) corrective regimen sliding scale   SubCutaneous three times a day before meals  insulin lispro Injectable (ADMELOG) 6 Unit(s) SubCutaneous three times a day before meals  isosorbide   dinitrate Tablet (ISORDIL) 30 milliGRAM(s) Oral three times a day  metoprolol succinate ER 25 milliGRAM(s) Oral daily  pantoprazole    Tablet 40 milliGRAM(s) Oral before breakfast  senna 2 Tablet(s) Oral at bedtime  tamsulosin 0.4 milliGRAM(s) Oral at bedtime    MEDICATIONS  (PRN):  polyethylene glycol 3350 17 Gram(s) Oral daily PRN Constipation      PHYSICAL EXAM:  GENERAL:   HEAD:  Atraumatic, Normocephalic  EYES: EOMI, PERRLA, conjunctiva and sclera clear  NECK: Supple, No JVD, Normal thyroid, no enlarged nodes  NERVOUS SYSTEM:  Alert & Awake.   CHEST/LUNG: B/L good air entry; No rales, rhonchi, or wheezing  HEART: S1S2 normal, no S3, Regular rate and rhythm; No murmurs  ABDOMEN: Soft, Nontender, Nondistended; Bowel sounds present  EXTREMITIES:  2+ Peripheral Pulses, No clubbing, cyanosis, or edema  LYMPH: No lymphadenopathy noted  SKIN: No rashes or lesions    LABS:                        10.8   7.74  )-----------( 240      ( 09 Nov 2023 03:52 )             36.1     11-09    136  |  101  |  44<H>  ----------------------------<  172<H>  4.6   |  20<L>  |  2.12<H>    Ca    9.0      09 Nov 2023 03:52  Phos  4.6     11-09  Mg     2.2     11-09    TPro  6.3  /  Alb  3.3  /  TBili  0.4  /  DBili  x   /  AST  18  /  ALT  38  /  AlkPhos  97  11-09    LIVER FUNCTIONS - ( 09 Nov 2023 03:52 )  Alb: 3.3 g/dL / Pro: 6.3 g/dL / ALK PHOS: 97 U/L / ALT: 38 U/L / AST: 18 U/L / GGT: x           PT/INR - ( 09 Nov 2023 03:52 )   PT: 19.5 sec;   INR: 1.89 ratio         PTT - ( 09 Nov 2023 03:52 )  PTT:55.9 sec  CAPILLARY BLOOD GLUCOSE      POCT Blood Glucose.: 195 mg/dL (09 Nov 2023 06:19)  POCT Blood Glucose.: 174 mg/dL (09 Nov 2023 03:09)  POCT Blood Glucose.: 262 mg/dL (08 Nov 2023 21:52)  POCT Blood Glucose.: 167 mg/dL (08 Nov 2023 19:23)  POCT Blood Glucose.: 136 mg/dL (08 Nov 2023 18:17)  POCT Blood Glucose.: 127 mg/dL (08 Nov 2023 17:25)  POCT Blood Glucose.: 37 mg/dL (08 Nov 2023 17:02)  POCT Blood Glucose.: 41 mg/dL (08 Nov 2023 17:01)  POCT Blood Glucose.: 126 mg/dL (08 Nov 2023 13:20)  POCT Blood Glucose.: 176 mg/dL (08 Nov 2023 08:37)        CARDIAC MARKERS ( 07 Nov 2023 23:17 )  x     / x     / 49 U/L / x     / 3.4 ng/mL      Urinalysis Basic - ( 09 Nov 2023 03:52 )    Color: x / Appearance: x / SG: x / pH: x  Gluc: 172 mg/dL / Ketone: x  / Bili: x / Urobili: x   Blood: x / Protein: x / Nitrite: x   Leuk Esterase: x / RBC: x / WBC x   Sq Epi: x / Non Sq Epi: x / Bacteria: x          RADIOLOGY & ADDITIONAL TESTS:  CXR:        Care Discussed with Consultants/Other Providers [ x] YES  [ ] NO           Patient is a 58y old  Male who presents with a chief complaint of NSTEMI (08 Nov 2023 19:14)    HPI:  Patient with separate chart from [facility], MRN# [id number]    57 yo Male pmhx CVA with mild left sided deficits, HTN, DM2, vertigo, HLD, Mobitz II s/pp Medtronic dual chamber ICD (12/21/22) initially presented to [facility] from home with complaints of dyspnea and chest pain and was admitted w/ acute hypoxic respiratory failure likely due to acute pulmonary edema due to acute HFrEF in setting of acute NSTEMI, LAURA and enterovirus infection. Pt with brief MICU stay at [facility] requiring bipap (no intubation), was treated for PNA with cefepime, and was found to have TTE done 9/26/23 showing EF 20% with severely decreased LVSF, multiple regional wall motion abnormalities, and elevated LV end diastolic pressure. Pt was transferred [facility] for cardiac cath evaluation. Patient without any acute complaints, complaining of intermittent palpitations for the last 2 days. No chest pain, SOB, fevers, nausea, vomiting, abdominal pain.  (13 Oct 2023 21:05)       INTERVAL HPI/OVERNIGHT EVENTS:   Patient had an episode of VT that coincided with hypoglycemia to 37. Lantus was lowered from 30 to 15.    Subjective: Patient was seen and examined at the bedside. Reports no cp, sob, abdominal pain, n/v.    ICU Vital Signs Last 24 Hrs  T(C): 36.6 (09 Nov 2023 06:00), Max: 36.7 (08 Nov 2023 15:00)  T(F): 97.8 (09 Nov 2023 06:00), Max: 98.1 (08 Nov 2023 15:00)  HR: 106 (09 Nov 2023 07:00) (102 - 115)  BP: 102/62 (09 Nov 2023 07:00) (93/55 - 132/82)  BP(mean): 76 (09 Nov 2023 07:00) (67 - 102)  ABP: --  ABP(mean): --  RR: 20 (09 Nov 2023 07:00) (11 - 36)  SpO2: 95% (09 Nov 2023 07:00) (91% - 99%)    O2 Parameters below as of 09 Nov 2023 07:00  Patient On (Oxygen Delivery Method): nasal cannula  O2 Flow (L/min): 3        I&O's Summary    08 Nov 2023 07:01  -  09 Nov 2023 07:00  --------------------------------------------------------  IN: 892 mL / OUT: 800 mL / NET: 92 mL          Daily     Daily     Adult Advanced Hemodynamics Last 24 Hrs  CVP(mm Hg): --  CVP(cm H2O): --  CO: --  CI: --  PA: --  PA(mean): --  PCWP: --  SVR: --  SVRI: --  PVR: --  PVRI: --    EKG/Telemetry Events:    MEDICATIONS  (STANDING):  aMIOdarone    Tablet   Oral   aMIOdarone    Tablet 400 milliGRAM(s) Oral every 8 hours  aspirin enteric coated 81 milliGRAM(s) Oral daily  atorvastatin 80 milliGRAM(s) Oral at bedtime  buMETAnide Injectable 2 milliGRAM(s) IV Push two times a day  chlorhexidine 2% Cloths 1 Application(s) Topical <User Schedule>  heparin  Infusion 1100 Unit(s)/Hr (8 mL/Hr) IV Continuous <Continuous>  hydrALAZINE 75 milliGRAM(s) Oral every 8 hours  insulin glargine Injectable (LANTUS) 15 Unit(s) SubCutaneous at bedtime  insulin lispro (ADMELOG) corrective regimen sliding scale   SubCutaneous at bedtime  insulin lispro (ADMELOG) corrective regimen sliding scale   SubCutaneous three times a day before meals  insulin lispro Injectable (ADMELOG) 6 Unit(s) SubCutaneous three times a day before meals  isosorbide   dinitrate Tablet (ISORDIL) 30 milliGRAM(s) Oral three times a day  metoprolol succinate ER 25 milliGRAM(s) Oral daily  pantoprazole    Tablet 40 milliGRAM(s) Oral before breakfast  senna 2 Tablet(s) Oral at bedtime  tamsulosin 0.4 milliGRAM(s) Oral at bedtime    MEDICATIONS  (PRN):  polyethylene glycol 3350 17 Gram(s) Oral daily PRN Constipation      PHYSICAL EXAM:    HEAD:  Atraumatic, Normocephalic  EYES: EOMI, PERRLA, conjunctiva and sclera clear  NECK: Supple, No JVD,  NERVOUS SYSTEM:  Alert & Awake.   CHEST/LUNG: B/L crackles in middle and lower lung fields.   HEART: S1S2 normal, no S3, Regular rate and rhythm; No murmurs  ABDOMEN: Soft, Nontender, Nondistended; Bowel sounds present  EXTREMITIES:  2+ Peripheral Pulses, No clubbing, cyanosis, or edema      LABS:                        10.8   7.74  )-----------( 240      ( 09 Nov 2023 03:52 )             36.1     11-09    136  |  101  |  44<H>  ----------------------------<  172<H>  4.6   |  20<L>  |  2.12<H>    Ca    9.0      09 Nov 2023 03:52  Phos  4.6     11-09  Mg     2.2     11-09    TPro  6.3  /  Alb  3.3  /  TBili  0.4  /  DBili  x   /  AST  18  /  ALT  38  /  AlkPhos  97  11-09    LIVER FUNCTIONS - ( 09 Nov 2023 03:52 )  Alb: 3.3 g/dL / Pro: 6.3 g/dL / ALK PHOS: 97 U/L / ALT: 38 U/L / AST: 18 U/L / GGT: x           PT/INR - ( 09 Nov 2023 03:52 )   PT: 19.5 sec;   INR: 1.89 ratio         PTT - ( 09 Nov 2023 03:52 )  PTT:55.9 sec  CAPILLARY BLOOD GLUCOSE      POCT Blood Glucose.: 195 mg/dL (09 Nov 2023 06:19)  POCT Blood Glucose.: 174 mg/dL (09 Nov 2023 03:09)  POCT Blood Glucose.: 262 mg/dL (08 Nov 2023 21:52)  POCT Blood Glucose.: 167 mg/dL (08 Nov 2023 19:23)  POCT Blood Glucose.: 136 mg/dL (08 Nov 2023 18:17)  POCT Blood Glucose.: 127 mg/dL (08 Nov 2023 17:25)  POCT Blood Glucose.: 37 mg/dL (08 Nov 2023 17:02)  POCT Blood Glucose.: 41 mg/dL (08 Nov 2023 17:01)  POCT Blood Glucose.: 126 mg/dL (08 Nov 2023 13:20)  POCT Blood Glucose.: 176 mg/dL (08 Nov 2023 08:37)        CARDIAC MARKERS ( 07 Nov 2023 23:17 )  x     / x     / 49 U/L / x     / 3.4 ng/mL      Urinalysis Basic - ( 09 Nov 2023 03:52 )    Color: x / Appearance: x / SG: x / pH: x  Gluc: 172 mg/dL / Ketone: x  / Bili: x / Urobili: x   Blood: x / Protein: x / Nitrite: x   Leuk Esterase: x / RBC: x / WBC x   Sq Epi: x / Non Sq Epi: x / Bacteria: x          RADIOLOGY & ADDITIONAL TESTS:  CXR:        Care Discussed with Consultants/Other Providers [ x] YES  [ ] NO

## 2023-11-09 NOTE — PROGRESS NOTE ADULT - ASSESSMENT
58M DM2, HTN, HLD, prior CVA (2012) with mild L sided weakness presenting as a transfer from [facility] for NSTEMI on 10/13/23. His hospital course has been complicated by acute systolic dysfunction, LV thrombus, and possible acute stroke.  - He has had a LHC with triple vessel disease and a RHC with elevated filling pressures    #Acute Hypoxemic Respiratory Failure  - Continue supplemental O2 to maintain O2 sat > 90% - taper down FiO2 as able. Was on 2L this AM.  - Incentive spirometer and acapella to facilitate breathing - patient will be able to cooperate with this at this point  - Hypoxemia most likely related to his cardiovascular disease and pleural effusions  - Would recommend ABG if any change/worsening of his respiratory status or mental status to evaluate for hypercapnia    #Pleural Effusion  - Continued attempts at volume removal with close monitoring of I/O and daily weights.   - From a pulmonary standpoint patient requires continued attempts at volume removal/diuresis  - No role for thoracentesis at this time as patient is breathing comfortably and has bilateral effusions in the setting of known cardiovascular disease  - He would have to be off AC if thoracentesis is needed in the future - currently on AC for LV thrombus with Heparin drip and Warfarin    #6 mm pulmonary nodule  - Attention to follow-up on repeat imaging in 3-6 months  - Patient does not have any history of smoking, fevers/night sweats, or reported B-symptoms    #Consolidations  - Patient reportedly treated for pneumonia at [facility] - though unclear duration of antibiotics  - Follow-up results of cultures sent yesterday - procalcitonin not elevated and sputum culture negative so now being monitored off antibiotics    #Cardiovascular disease  - Continue management as per cardiology - currently on diuresis BID  - From a pulmonary standpoint he needs continued diuresis - his most recent RHC from 10/30 notes continued elevation in PCWP  - Continue AC for LV thrombus as per Cardiology  - Patient not a candidate for CABG - has triple vessel disease - remains on ASA and statin    #Prophylaxis  - DVT proph  - GI proph  - Aspiration precautions - this will be particularly important given his lethargy/forgetfulness at times  - Incentive spirometer and acapella    Patient's prognosis is guarded. Planned for transfer out of CICU to telemetry floors.

## 2023-11-09 NOTE — PROGRESS NOTE ADULT - SUBJECTIVE AND OBJECTIVE BOX
Patient seen and examined at bedside.    Overnight Events: NSVT overnight, pt notes he only "felt cold" never had any dizziness palpitations CP or SOB. Today he feels ok but a bit off. Spoke to patient about his arrhythmias and possible benefit of revascularization and maybe even AT eval. Patient is overall amenable but seems like he does not fully understand, deferring to his wife. CTH repeated per neuro, stable. Will speak to EP about possible ablation for his NSVT, speak to IC about PCI, and speak to family and pt more about potential AT evaluation if need be as patient is young and was previously functional.     Telemetry:  NSVT     Review of Systems: Negative except as per HPI     Current Meds:  aMIOdarone    Tablet   Oral   aMIOdarone    Tablet 400 milliGRAM(s) Oral every 8 hours  aspirin enteric coated 81 milliGRAM(s) Oral daily  atorvastatin 80 milliGRAM(s) Oral at bedtime  buMETAnide Injectable 2 milliGRAM(s) IV Push two times a day  heparin  Infusion 1100 Unit(s)/Hr IV Continuous <Continuous>  hydrALAZINE 75 milliGRAM(s) Oral every 8 hours  insulin glargine Injectable (LANTUS) 15 Unit(s) SubCutaneous at bedtime  insulin lispro (ADMELOG) corrective regimen sliding scale   SubCutaneous three times a day before meals  insulin lispro (ADMELOG) corrective regimen sliding scale   SubCutaneous at bedtime  insulin lispro Injectable (ADMELOG) 6 Unit(s) SubCutaneous three times a day before meals  isosorbide   dinitrate Tablet (ISORDIL) 30 milliGRAM(s) Oral three times a day  metoprolol succinate ER 25 milliGRAM(s) Oral daily  pantoprazole    Tablet 40 milliGRAM(s) Oral before breakfast  polyethylene glycol 3350 17 Gram(s) Oral daily PRN  senna 2 Tablet(s) Oral at bedtime  tamsulosin 0.4 milliGRAM(s) Oral at bedtime  warfarin 5 milliGRAM(s) Oral once      Vitals:  T(F): 97.4 (11-09), Max: 98.1 (11-08)  HR: 105 (11-09) (101 - 112)  BP: 104/68 (11-09) (93/55 - 132/82)  RR: 18 (11-09)  SpO2: 95% (11-09)  I&O's Summary    08 Nov 2023 07:01  -  09 Nov 2023 07:00  --------------------------------------------------------  IN: 892 mL / OUT: 800 mL / NET: 92 mL    09 Nov 2023 07:01  -  09 Nov 2023 14:36  --------------------------------------------------------  IN: 220 mL / OUT: 100 mL / NET: 120 mL        Physical Exam:  Appearance: No acute distress; well appearing  Eyes: PERRL, EOMI, pink conjunctiva  HEENT: Normal oral mucosa  Cardiovascular: RRR, S1, S2, no murmurs, rubs, or gallops; no edema; no JVD  Respiratory: Clear to auscultation bilaterally  Gastrointestinal: soft, non-tender, non-distended with normal bowel sounds  Musculoskeletal: No clubbing; no joint deformity   Neurologic: Non-focal  Lymphatic: No lymphadenopathy  Psychiatry: AAOx3, mood & affect appropriate  Skin: No rashes, ecchymoses, or cyanosis                          10.8   7.74  )-----------( 240      ( 09 Nov 2023 03:52 )             36.1     11-09    136  |  101  |  44<H>  ----------------------------<  172<H>  4.6   |  20<L>  |  2.12<H>    Ca    9.0      09 Nov 2023 03:52  Phos  4.6     11-09  Mg     2.2     11-09    TPro  6.3  /  Alb  3.3  /  TBili  0.4  /  DBili  x   /  AST  18  /  ALT  38  /  AlkPhos  97  11-09    PT/INR - ( 09 Nov 2023 03:52 )   PT: 19.5 sec;   INR: 1.89 ratio         PTT - ( 09 Nov 2023 03:52 )  PTT:55.9 sec  CARDIAC MARKERS ( 07 Nov 2023 23:17 )  550 ng/L / x     / x     / 49 U/L / x     / 3.4 ng/mL     Patient seen and examined at bedside.    Overnight Events: NSVT overnight, pt notes he only "felt cold" never had any dizziness palpitations CP or SOB. Today he feels ok but a bit off. Spoke to patient about his arrhythmias and possible benefit of revascularization and maybe even AT eval. Patient is overall amenable but seems like he does not fully understand, deferring to his wife. CTH repeated per neuro, stable. Will speak to EP about possible ablation for his NSVT, speak to IC about PCI, and speak to family and pt more about potential AT evaluation if need be as patient is young and was previously functional. Otherwise cont Bumex 2 BID, F/U abnormal CT chest findings, repeat PT eval and get MI for r/o PAD     Telemetry:  NSVT intermittently    Review of Systems: Negative except as per HPI     Current Meds:  aMIOdarone    Tablet   Oral   aMIOdarone    Tablet 400 milliGRAM(s) Oral every 8 hours  aspirin enteric coated 81 milliGRAM(s) Oral daily  atorvastatin 80 milliGRAM(s) Oral at bedtime  buMETAnide Injectable 2 milliGRAM(s) IV Push two times a day  heparin  Infusion 1100 Unit(s)/Hr IV Continuous <Continuous>  hydrALAZINE 75 milliGRAM(s) Oral every 8 hours  insulin glargine Injectable (LANTUS) 15 Unit(s) SubCutaneous at bedtime  insulin lispro (ADMELOG) corrective regimen sliding scale   SubCutaneous three times a day before meals  insulin lispro (ADMELOG) corrective regimen sliding scale   SubCutaneous at bedtime  insulin lispro Injectable (ADMELOG) 6 Unit(s) SubCutaneous three times a day before meals  isosorbide   dinitrate Tablet (ISORDIL) 30 milliGRAM(s) Oral three times a day  metoprolol succinate ER 25 milliGRAM(s) Oral daily  pantoprazole    Tablet 40 milliGRAM(s) Oral before breakfast  polyethylene glycol 3350 17 Gram(s) Oral daily PRN  senna 2 Tablet(s) Oral at bedtime  tamsulosin 0.4 milliGRAM(s) Oral at bedtime  warfarin 5 milliGRAM(s) Oral once      Vitals:  T(F): 97.4 (11-09), Max: 98.1 (11-08)  HR: 105 (11-09) (101 - 112)  BP: 104/68 (11-09) (93/55 - 132/82)  RR: 18 (11-09)  SpO2: 95% (11-09)  I&O's Summary    08 Nov 2023 07:01  -  09 Nov 2023 07:00  --------------------------------------------------------  IN: 892 mL / OUT: 800 mL / NET: 92 mL    09 Nov 2023 07:01  -  09 Nov 2023 14:36  --------------------------------------------------------  IN: 220 mL / OUT: 100 mL / NET: 120 mL        Physical Exam:  Appearance: No acute distress; weak appearing  Eyes: PERRL, EOMI, pink conjunctiva  HEENT: Normal oral mucosa  Cardiovascular: RRR, S1, S2, no murmurs, rubs, or gallops; no edema; mild JVD  Respiratory: Clear to auscultation bilaterally  Gastrointestinal: soft, non-tender, non-distended with normal bowel sounds  Musculoskeletal: No clubbing; no joint deformity   Neurologic: Non-focal  Lymphatic: No lymphadenopathy  Psychiatry: AAOx3, flat affect   Skin: No rashes, ecchymoses, or cyanosis                          10.8   7.74  )-----------( 240      ( 09 Nov 2023 03:52 )             36.1     11-09    136  |  101  |  44<H>  ----------------------------<  172<H>  4.6   |  20<L>  |  2.12<H>    Ca    9.0      09 Nov 2023 03:52  Phos  4.6     11-09  Mg     2.2     11-09    TPro  6.3  /  Alb  3.3  /  TBili  0.4  /  DBili  x   /  AST  18  /  ALT  38  /  AlkPhos  97  11-09    PT/INR - ( 09 Nov 2023 03:52 )   PT: 19.5 sec;   INR: 1.89 ratio         PTT - ( 09 Nov 2023 03:52 )  PTT:55.9 sec  CARDIAC MARKERS ( 07 Nov 2023 23:17 )  550 ng/L / x     / x     / 49 U/L / x     / 3.4 ng/mL

## 2023-11-09 NOTE — CONSULT NOTE ADULT - PROBLEM SELECTOR RECOMMENDATION 6
Will continue to follow. Case discussed with CICU and heart failure teams.    For acute issues or uncontrolled symptoms please page palliative team.    Kathi Torres MD  Geriatrics and Palliative Medicine Attending  [facility] pager: [phone]
continue flomax  monitor urine output  trend bmp  ck pvr if cr increased

## 2023-11-10 LAB
ALBUMIN SERPL ELPH-MCNC: 3.3 G/DL — SIGNIFICANT CHANGE UP (ref 3.3–5)
ALBUMIN SERPL ELPH-MCNC: 3.3 G/DL — SIGNIFICANT CHANGE UP (ref 3.3–5)
ALP SERPL-CCNC: 86 U/L — SIGNIFICANT CHANGE UP (ref 40–120)
ALP SERPL-CCNC: 86 U/L — SIGNIFICANT CHANGE UP (ref 40–120)
ALT FLD-CCNC: 34 U/L — SIGNIFICANT CHANGE UP (ref 10–45)
ALT FLD-CCNC: 34 U/L — SIGNIFICANT CHANGE UP (ref 10–45)
ANION GAP SERPL CALC-SCNC: 14 MMOL/L — SIGNIFICANT CHANGE UP (ref 5–17)
ANION GAP SERPL CALC-SCNC: 14 MMOL/L — SIGNIFICANT CHANGE UP (ref 5–17)
APTT BLD: 75.2 SEC — HIGH (ref 24.5–35.6)
APTT BLD: 75.2 SEC — HIGH (ref 24.5–35.6)
AST SERPL-CCNC: 18 U/L — SIGNIFICANT CHANGE UP (ref 10–40)
AST SERPL-CCNC: 18 U/L — SIGNIFICANT CHANGE UP (ref 10–40)
BASOPHILS # BLD AUTO: 0.01 K/UL — SIGNIFICANT CHANGE UP (ref 0–0.2)
BASOPHILS # BLD AUTO: 0.01 K/UL — SIGNIFICANT CHANGE UP (ref 0–0.2)
BASOPHILS NFR BLD AUTO: 0.2 % — SIGNIFICANT CHANGE UP (ref 0–2)
BASOPHILS NFR BLD AUTO: 0.2 % — SIGNIFICANT CHANGE UP (ref 0–2)
BILIRUB SERPL-MCNC: 0.4 MG/DL — SIGNIFICANT CHANGE UP (ref 0.2–1.2)
BILIRUB SERPL-MCNC: 0.4 MG/DL — SIGNIFICANT CHANGE UP (ref 0.2–1.2)
BUN SERPL-MCNC: 46 MG/DL — HIGH (ref 7–23)
BUN SERPL-MCNC: 46 MG/DL — HIGH (ref 7–23)
CALCIUM SERPL-MCNC: 9.1 MG/DL — SIGNIFICANT CHANGE UP (ref 8.4–10.5)
CALCIUM SERPL-MCNC: 9.1 MG/DL — SIGNIFICANT CHANGE UP (ref 8.4–10.5)
CHLORIDE SERPL-SCNC: 102 MMOL/L — SIGNIFICANT CHANGE UP (ref 96–108)
CHLORIDE SERPL-SCNC: 102 MMOL/L — SIGNIFICANT CHANGE UP (ref 96–108)
CO2 SERPL-SCNC: 22 MMOL/L — SIGNIFICANT CHANGE UP (ref 22–31)
CO2 SERPL-SCNC: 22 MMOL/L — SIGNIFICANT CHANGE UP (ref 22–31)
CREAT SERPL-MCNC: 2.38 MG/DL — HIGH (ref 0.5–1.3)
CREAT SERPL-MCNC: 2.38 MG/DL — HIGH (ref 0.5–1.3)
EGFR: 31 ML/MIN/1.73M2 — LOW
EGFR: 31 ML/MIN/1.73M2 — LOW
EOSINOPHIL # BLD AUTO: 0.01 K/UL — SIGNIFICANT CHANGE UP (ref 0–0.5)
EOSINOPHIL # BLD AUTO: 0.01 K/UL — SIGNIFICANT CHANGE UP (ref 0–0.5)
EOSINOPHIL NFR BLD AUTO: 0.2 % — SIGNIFICANT CHANGE UP (ref 0–6)
EOSINOPHIL NFR BLD AUTO: 0.2 % — SIGNIFICANT CHANGE UP (ref 0–6)
GLUCOSE BLDC GLUCOMTR-MCNC: 181 MG/DL — HIGH (ref 70–99)
GLUCOSE BLDC GLUCOMTR-MCNC: 181 MG/DL — HIGH (ref 70–99)
GLUCOSE BLDC GLUCOMTR-MCNC: 188 MG/DL — HIGH (ref 70–99)
GLUCOSE BLDC GLUCOMTR-MCNC: 188 MG/DL — HIGH (ref 70–99)
GLUCOSE BLDC GLUCOMTR-MCNC: 206 MG/DL — HIGH (ref 70–99)
GLUCOSE BLDC GLUCOMTR-MCNC: 206 MG/DL — HIGH (ref 70–99)
GLUCOSE BLDC GLUCOMTR-MCNC: 267 MG/DL — HIGH (ref 70–99)
GLUCOSE BLDC GLUCOMTR-MCNC: 267 MG/DL — HIGH (ref 70–99)
GLUCOSE BLDC GLUCOMTR-MCNC: 271 MG/DL — HIGH (ref 70–99)
GLUCOSE BLDC GLUCOMTR-MCNC: 271 MG/DL — HIGH (ref 70–99)
GLUCOSE SERPL-MCNC: 191 MG/DL — HIGH (ref 70–99)
GLUCOSE SERPL-MCNC: 191 MG/DL — HIGH (ref 70–99)
HCT VFR BLD CALC: 36.4 % — LOW (ref 39–50)
HCT VFR BLD CALC: 36.4 % — LOW (ref 39–50)
HGB BLD-MCNC: 10.9 G/DL — LOW (ref 13–17)
HGB BLD-MCNC: 10.9 G/DL — LOW (ref 13–17)
IMM GRANULOCYTES NFR BLD AUTO: 0.5 % — SIGNIFICANT CHANGE UP (ref 0–0.9)
IMM GRANULOCYTES NFR BLD AUTO: 0.5 % — SIGNIFICANT CHANGE UP (ref 0–0.9)
INR BLD: 2.15 RATIO — HIGH (ref 0.85–1.18)
INR BLD: 2.15 RATIO — HIGH (ref 0.85–1.18)
LYMPHOCYTES # BLD AUTO: 0.49 K/UL — LOW (ref 1–3.3)
LYMPHOCYTES # BLD AUTO: 0.49 K/UL — LOW (ref 1–3.3)
LYMPHOCYTES # BLD AUTO: 7.9 % — LOW (ref 13–44)
LYMPHOCYTES # BLD AUTO: 7.9 % — LOW (ref 13–44)
MAGNESIUM SERPL-MCNC: 2.2 MG/DL — SIGNIFICANT CHANGE UP (ref 1.6–2.6)
MAGNESIUM SERPL-MCNC: 2.2 MG/DL — SIGNIFICANT CHANGE UP (ref 1.6–2.6)
MCHC RBC-ENTMCNC: 24.9 PG — LOW (ref 27–34)
MCHC RBC-ENTMCNC: 24.9 PG — LOW (ref 27–34)
MCHC RBC-ENTMCNC: 29.9 GM/DL — LOW (ref 32–36)
MCHC RBC-ENTMCNC: 29.9 GM/DL — LOW (ref 32–36)
MCV RBC AUTO: 83.1 FL — SIGNIFICANT CHANGE UP (ref 80–100)
MCV RBC AUTO: 83.1 FL — SIGNIFICANT CHANGE UP (ref 80–100)
MONOCYTES # BLD AUTO: 0.47 K/UL — SIGNIFICANT CHANGE UP (ref 0–0.9)
MONOCYTES # BLD AUTO: 0.47 K/UL — SIGNIFICANT CHANGE UP (ref 0–0.9)
MONOCYTES NFR BLD AUTO: 7.5 % — SIGNIFICANT CHANGE UP (ref 2–14)
MONOCYTES NFR BLD AUTO: 7.5 % — SIGNIFICANT CHANGE UP (ref 2–14)
NEUTROPHILS # BLD AUTO: 5.22 K/UL — SIGNIFICANT CHANGE UP (ref 1.8–7.4)
NEUTROPHILS # BLD AUTO: 5.22 K/UL — SIGNIFICANT CHANGE UP (ref 1.8–7.4)
NEUTROPHILS NFR BLD AUTO: 83.7 % — HIGH (ref 43–77)
NEUTROPHILS NFR BLD AUTO: 83.7 % — HIGH (ref 43–77)
NRBC # BLD: 0 /100 WBCS — SIGNIFICANT CHANGE UP (ref 0–0)
NRBC # BLD: 0 /100 WBCS — SIGNIFICANT CHANGE UP (ref 0–0)
PHOSPHATE SERPL-MCNC: 4.3 MG/DL — SIGNIFICANT CHANGE UP (ref 2.5–4.5)
PHOSPHATE SERPL-MCNC: 4.3 MG/DL — SIGNIFICANT CHANGE UP (ref 2.5–4.5)
PLATELET # BLD AUTO: 259 K/UL — SIGNIFICANT CHANGE UP (ref 150–400)
PLATELET # BLD AUTO: 259 K/UL — SIGNIFICANT CHANGE UP (ref 150–400)
POTASSIUM SERPL-MCNC: 4.7 MMOL/L — SIGNIFICANT CHANGE UP (ref 3.5–5.3)
POTASSIUM SERPL-MCNC: 4.7 MMOL/L — SIGNIFICANT CHANGE UP (ref 3.5–5.3)
POTASSIUM SERPL-SCNC: 4.7 MMOL/L — SIGNIFICANT CHANGE UP (ref 3.5–5.3)
POTASSIUM SERPL-SCNC: 4.7 MMOL/L — SIGNIFICANT CHANGE UP (ref 3.5–5.3)
PROT SERPL-MCNC: 6.3 G/DL — SIGNIFICANT CHANGE UP (ref 6–8.3)
PROT SERPL-MCNC: 6.3 G/DL — SIGNIFICANT CHANGE UP (ref 6–8.3)
PROTHROM AB SERPL-ACNC: 23.1 SEC — HIGH (ref 9.5–13)
PROTHROM AB SERPL-ACNC: 23.1 SEC — HIGH (ref 9.5–13)
RBC # BLD: 4.38 M/UL — SIGNIFICANT CHANGE UP (ref 4.2–5.8)
RBC # BLD: 4.38 M/UL — SIGNIFICANT CHANGE UP (ref 4.2–5.8)
RBC # FLD: 15.9 % — HIGH (ref 10.3–14.5)
RBC # FLD: 15.9 % — HIGH (ref 10.3–14.5)
SODIUM SERPL-SCNC: 138 MMOL/L — SIGNIFICANT CHANGE UP (ref 135–145)
SODIUM SERPL-SCNC: 138 MMOL/L — SIGNIFICANT CHANGE UP (ref 135–145)
TSH SERPL-MCNC: 0.34 UIU/ML — SIGNIFICANT CHANGE UP (ref 0.27–4.2)
TSH SERPL-MCNC: 0.34 UIU/ML — SIGNIFICANT CHANGE UP (ref 0.27–4.2)
WBC # BLD: 6.23 K/UL — SIGNIFICANT CHANGE UP (ref 3.8–10.5)
WBC # BLD: 6.23 K/UL — SIGNIFICANT CHANGE UP (ref 3.8–10.5)
WBC # FLD AUTO: 6.23 K/UL — SIGNIFICANT CHANGE UP (ref 3.8–10.5)
WBC # FLD AUTO: 6.23 K/UL — SIGNIFICANT CHANGE UP (ref 3.8–10.5)

## 2023-11-10 PROCEDURE — 99233 SBSQ HOSP IP/OBS HIGH 50: CPT

## 2023-11-10 PROCEDURE — 70544 MR ANGIOGRAPHY HEAD W/O DYE: CPT | Mod: 26,59

## 2023-11-10 PROCEDURE — 70551 MRI BRAIN STEM W/O DYE: CPT | Mod: 26

## 2023-11-10 PROCEDURE — 93287 PERI-PX DEVICE EVAL & PRGR: CPT | Mod: 26,76

## 2023-11-10 PROCEDURE — 70547 MR ANGIOGRAPHY NECK W/O DYE: CPT | Mod: 26

## 2023-11-10 RX ORDER — CHLORHEXIDINE GLUCONATE 213 G/1000ML
1 SOLUTION TOPICAL
Refills: 0 | Status: DISCONTINUED | OUTPATIENT
Start: 2023-11-10 | End: 2023-11-21

## 2023-11-10 RX ORDER — DAPAGLIFLOZIN 10 MG/1
10 TABLET, FILM COATED ORAL EVERY 24 HOURS
Refills: 0 | Status: DISCONTINUED | OUTPATIENT
Start: 2023-11-10 | End: 2023-11-14

## 2023-11-10 RX ORDER — AMIODARONE HYDROCHLORIDE 400 MG/1
TABLET ORAL
Refills: 0 | Status: DISCONTINUED | OUTPATIENT
Start: 2023-11-10 | End: 2023-11-10

## 2023-11-10 RX ORDER — BUMETANIDE 0.25 MG/ML
2 INJECTION INTRAMUSCULAR; INTRAVENOUS DAILY
Refills: 0 | Status: DISCONTINUED | OUTPATIENT
Start: 2023-11-10 | End: 2023-11-10

## 2023-11-10 RX ADMIN — Medication 1: at 21:28

## 2023-11-10 RX ADMIN — Medication 75 MILLIGRAM(S): at 13:16

## 2023-11-10 RX ADMIN — Medication 75 MILLIGRAM(S): at 05:55

## 2023-11-10 RX ADMIN — Medication 6 UNIT(S): at 08:14

## 2023-11-10 RX ADMIN — INSULIN GLARGINE 15 UNIT(S): 100 INJECTION, SOLUTION SUBCUTANEOUS at 21:27

## 2023-11-10 RX ADMIN — ISOSORBIDE DINITRATE 30 MILLIGRAM(S): 5 TABLET ORAL at 05:55

## 2023-11-10 RX ADMIN — Medication 3: at 17:29

## 2023-11-10 RX ADMIN — Medication 6 UNIT(S): at 12:10

## 2023-11-10 RX ADMIN — ATORVASTATIN CALCIUM 80 MILLIGRAM(S): 80 TABLET, FILM COATED ORAL at 21:26

## 2023-11-10 RX ADMIN — Medication 25 MILLIGRAM(S): at 05:54

## 2023-11-10 RX ADMIN — BUMETANIDE 2 MILLIGRAM(S): 0.25 INJECTION INTRAMUSCULAR; INTRAVENOUS at 05:55

## 2023-11-10 RX ADMIN — TAMSULOSIN HYDROCHLORIDE 0.4 MILLIGRAM(S): 0.4 CAPSULE ORAL at 21:26

## 2023-11-10 RX ADMIN — Medication 6 UNIT(S): at 17:29

## 2023-11-10 RX ADMIN — Medication 2: at 08:15

## 2023-11-10 RX ADMIN — PANTOPRAZOLE SODIUM 40 MILLIGRAM(S): 20 TABLET, DELAYED RELEASE ORAL at 05:55

## 2023-11-10 RX ADMIN — AMIODARONE HYDROCHLORIDE 400 MILLIGRAM(S): 400 TABLET ORAL at 05:56

## 2023-11-10 RX ADMIN — AMIODARONE HYDROCHLORIDE 400 MILLIGRAM(S): 400 TABLET ORAL at 21:26

## 2023-11-10 RX ADMIN — HEPARIN SODIUM 8 UNIT(S)/HR: 5000 INJECTION INTRAVENOUS; SUBCUTANEOUS at 08:30

## 2023-11-10 RX ADMIN — ISOSORBIDE DINITRATE 30 MILLIGRAM(S): 5 TABLET ORAL at 17:28

## 2023-11-10 RX ADMIN — Medication 75 MILLIGRAM(S): at 21:25

## 2023-11-10 RX ADMIN — AMIODARONE HYDROCHLORIDE 400 MILLIGRAM(S): 400 TABLET ORAL at 15:58

## 2023-11-10 RX ADMIN — Medication 81 MILLIGRAM(S): at 11:24

## 2023-11-10 RX ADMIN — Medication 1: at 12:13

## 2023-11-10 RX ADMIN — ISOSORBIDE DINITRATE 30 MILLIGRAM(S): 5 TABLET ORAL at 11:25

## 2023-11-10 NOTE — PROCEDURE NOTE - INTERROGATION NOTE: UNDERLYING RHYTHM
"Anesthesia Transfer of Care Note    Patient: Alfredo Treviño    Procedure(s) Performed: Procedure(s) (LRB):  RESECTION-TUMOR-ORAL (Left)    Patient location: PACU    Anesthesia Type: general    Transport from OR: Transported from OR on 6-10 L/min O2 by face mask with adequate spontaneous ventilation    Post pain: adequate analgesia    Post assessment: no apparent anesthetic complications    Post vital signs: stable    Level of consciousness: awake and alert    Nausea/Vomiting: no nausea/vomiting    Complications: none          Last vitals:   Visit Vitals    /72 (BP Location: Right arm, Patient Position: Lying, BP Method: Automatic)    Pulse (!) 57    Temp 36.5 °C (97.7 °F) (Oral)    Resp 20    Ht 5' 11" (1.803 m)    Wt 92.5 kg (204 lb)    SpO2 98%    BMI 28.45 kg/m2     "
SR 90s
SR 90s
Sinus Tachycardia 100-110s

## 2023-11-10 NOTE — PROGRESS NOTE ADULT - PROBLEM SELECTOR PLAN 3
Likely due to acute systolic HF, Treated for PNA at [facility]  - appreciated Pulmonary plans- O2, diuretic, incentive spirometer  - no plans for thoracentesis  - outpatient f/u for 6mm Pulmonary nodule

## 2023-11-10 NOTE — PROGRESS NOTE ADULT - PROBLEM SELECTOR PLAN 1
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 Status post [facility]-> 95% discrete proximal LAD disease and sequential multifocal disease with good distal target, 80-90% proximal LCx disease, severe multifocal RCA. Limited viability on MRI. Echocardiogram with LVEF <20%, rWMA & LV thrombus  - HF team & EP card f/u plans appreciated, continue with GDMT (Metoprolol ER 25mg/d, ARB/ARNI- none due to LAURA, No MRA, eventual SGLT2i)  - Bumex 2mg IV bid and Hydral 75mg tid and ISDN 30mg TID  - Inotropes: Off milrinone since 11/1  - Advanced therapies- holding off on launching VAD/transplant eval given cognitive issues   - May consider PCI / Ablation and AT eval if pt amenable and felt to be a good candidate   - ordered MI for r/o PAD  - daily weight and I/O

## 2023-11-10 NOTE — PROGRESS NOTE ADULT - ASSESSMENT
59 YO M with a history of CVA with residual mild R paresthesias, second degree Mobitz II heart block s/p DC-ICD 12/2022 (initial concern for sarcoid but negative biopsy/PET post procedure), DM2 (A1c 8.4%), and HTN who was admitted to [facility] with chest pain and dyspnea, found to have NSTEMI with markedly elevated troponins and new severe LV dysfunction with regional wall motion abnormalities as well as + enterovirus. He required NIPPV and was diuresed before being transferred to [facility] where LHC performed which revealed severe 3v CAD with critical proximal LAD involvement. CTS was consulted for CABG evaluation and HF consulted for comanagement.    He ultimately underwent RHC with revealed elevated wedge but normal cardiac output. He was transferred to CICU for swan placement and closer observation of hemodynamics. Found to have LV thrombus. Given concern for low flow status thus sent back to CCU 10/28. He is confused and was found to have R medial cerebellar infarct on head CT, with MRI showing evolving PICA stroke. Given evolving stroke, brain lesion and ongoing confusion he's not currently a candidate for coronary intervention. Additionally, per CTS review, no LAD viability. He's tolerated milrinone being weaned off as of 11/1 with stable end organ function and is tolerating escalation of vasodilators with room for further escalation. His CXR shows ongoing pulmonary vascular congestion. He's volume overloaded on exam with dilated, non collapsable IVC on bedside POCUS. Tachycardia has been improving off milrinone.    REVIEW OF STUDIES  TTE 10/26: EF <20% LVT present, small pericardial effusion w/o tamponade  RHC 10/19: RA 10, RV 47/9/13, Wedge 29, PA 41/26/33, CO/CI 4.4/2.3, PA sat 57.3  EKG: sinus tachycardia, septal q-waves, left axis deviation   TTE 10/16/23: LV 5.5 cm, LVEF 20% with regional WMAs worse in the apex, LVOT VTI 8 cm, normal RV size/function, severe functional MR, small pericardial effusion, estimated RA pressure 8 mmHg   TTE 12/2022: normal LVEF   LHC: 95% discrete proximal LAD disease and sequential multifocal disease with good distal target, 80-90% proximal LCx disease just prior to marginals, severe multifocal RCA disease with good targets   CMR: There is greater than 50% thickness subendocardial LGE within the mid anteroseptum, anterior and anterolateral segments and apical segments.    Hemodynamics:  10/30 RHC: RHC: RA 14/13/12, RV 51/14, PA 47/34/39, PCW 34/36/32, CI 2.5/CO4.95   11/1/23 CVP 13, PA 48/23/35, mVO2 62.6%, Cris CI 2.4  10/21/23: RA 13 with V-wave 21, PA 50/33, PCW 33, MvO2 68.2, Cris CO/CI 4.4/2.56

## 2023-11-10 NOTE — CONSULT NOTE ADULT - ASSESSMENT
58M on ASA81/Hep gtt. Hx DM2, Vertigo, Mobitz II heart block s/p Medtronic dual chamber ICD (2022, has "MRI" mode), admitted since 10/13 for acute hypoxic respiratory failure 2/2 HFrEF (EF 20%) iso NSTEMI, LAURA, and enterovirus. Known prior CVA w/ mild Lt sided deficits from previous admission at [facility]. Primary/neurology reaching out for consideration of stent in setting of Vertebrobasilar insufficiency. CTH/CTA&N 11/6 showed stable chronic Rt cerebellar infarct. Severe stenosis of dominant Lt VA at V4, occlusion of nondominant Rt VA at V3/V4. Moderate Lt M1 stenosis. No heme. Exam: very mild left facial o/w intact, AOx3 no drift GALLEGOS 5/5, SILT    -MR/MRA/NOVA

## 2023-11-10 NOTE — PROGRESS NOTE ADULT - PROBLEM SELECTOR PLAN 2
- 95% discrete proximal LAD disease and sequential multifocal disease with good distal target, 80-90% proximal LCx disease just prior to marginals, severe multifocal RCA. No viability on MRI.  - There is greater than 50% thickness subendocardial LGE within the mid anteroseptum, anterior and anterolateral segments and apical segments  > Spoke to IC team, pt not a good candidate for palliative PCI as significant LGE and pt would need ECMO for support which is very high risk in pt with recent strokes / LV thrombus / severe cerebrovascular disease.

## 2023-11-10 NOTE — PROGRESS NOTE ADULT - SUBJECTIVE AND OBJECTIVE BOX
Patient seen and examined at bedside.    Overnight Events:   - No acute events overnight  - On tele SR/ST 90-100s no ectopy  - Denies CP/SOB      Current Meds:  aMIOdarone    Tablet   Oral   aMIOdarone    Tablet 400 milliGRAM(s) Oral every 8 hours  aspirin enteric coated 81 milliGRAM(s) Oral daily  atorvastatin 80 milliGRAM(s) Oral at bedtime  buMETAnide Injectable 2 milliGRAM(s) IV Push daily  heparin  Infusion 1100 Unit(s)/Hr IV Continuous <Continuous>  hydrALAZINE 75 milliGRAM(s) Oral every 8 hours  insulin glargine Injectable (LANTUS) 15 Unit(s) SubCutaneous at bedtime  insulin lispro (ADMELOG) corrective regimen sliding scale   SubCutaneous at bedtime  insulin lispro (ADMELOG) corrective regimen sliding scale   SubCutaneous three times a day before meals  insulin lispro Injectable (ADMELOG) 6 Unit(s) SubCutaneous three times a day before meals  isosorbide   dinitrate Tablet (ISORDIL) 30 milliGRAM(s) Oral three times a day  metoprolol succinate ER 25 milliGRAM(s) Oral daily  pantoprazole    Tablet 40 milliGRAM(s) Oral before breakfast  polyethylene glycol 3350 17 Gram(s) Oral daily PRN  senna 2 Tablet(s) Oral at bedtime  tamsulosin 0.4 milliGRAM(s) Oral at bedtime      Vitals:  T(F): 98 (11-10), Max: 98.2 (11-09)  HR: 103 (11-10) (99 - 106)  BP: 109/71 (11-10) (97/58 - 112/75)  RR: 18 (11-10)  SpO2: 94% (11-10)  I&O's Summary    09 Nov 2023 07:01  -  10 Nov 2023 07:00  --------------------------------------------------------  IN: 220 mL / OUT: 100 mL / NET: 120 mL    10 Nov 2023 07:01  -  10 Nov 2023 09:54  --------------------------------------------------------  IN: 120 mL / OUT: 0 mL / NET: 120 mL        Physical Exam:  Appearance: A&O1-2. NAD on 2L NC	  Cardiovascular: +S1S2 RRR no m/g/r  Respiratory: CTA B/L	  Psychiatry: A & O x 3, Mood & affect appropriate  Gastrointestinal:  Soft, NT. ND. +BS	  Skin: No rashes	masses or lesions  Neurologic: Non-focal  Extremities: No edema BLE  Vascular: Peripheral pulses palpable 2+ bilaterally                            10.9   6.23  )-----------( 259      ( 10 Nov 2023 06:32 )             36.4     11-10    138  |  102  |  46<H>  ----------------------------<  191<H>  4.7   |  22  |  2.38<H>    Ca    9.1      10 Nov 2023 06:32  Phos  4.3     11-10  Mg     2.2     11-10    TPro  6.3  /  Alb  3.3  /  TBili  0.4  /  DBili  x   /  AST  18  /  ALT  34  /  AlkPhos  86  11-10    PT/INR - ( 10 Nov 2023 06:32 )   PT: 23.1 sec;   INR: 2.15 ratio         PTT - ( 10 Nov 2023 06:32 )  PTT:75.2 sec  CARDIAC MARKERS ( 07 Nov 2023 23:17 )  550 ng/L / x     / x     / 49 U/L / x     / 3.4 ng/mL

## 2023-11-10 NOTE — PROGRESS NOTE ADULT - ASSESSMENT
The patient is a 58M with h/o prior CVA (2012) with mild L sided weakness, Mobitz II s/p Medtronic dual chamber ICD (12/21/22) DM2, HTN, HLD, admitted in CCU as a transfer from [facility] for NSTEMI on 10/13/23. His hospital course has been complicated by acute systolic dysfunction, LV thrombus, and possible acute stroke. He was found to have triple vessel disease on LHC and a RHC with elevated filling pressures. Was also found to have MMVT and was evaluated by EP card, HF team, Pulmonary, Neurology and Palliative care. Patient has now been transferred from CICU to telemetry for further evaluation and treatment.

## 2023-11-10 NOTE — PROGRESS NOTE ADULT - PROBLEM SELECTOR PLAN 3
- Slow VT on 11/7  - EP following   - Likely more scar based than ischemic  - Cont MTP Succ 25   - F/U EP recs   > Requested input about possible VT ablation 11/9   > Will not be considered until full 10 g amio load given

## 2023-11-10 NOTE — GOALS OF CARE CONVERSATION - ADVANCED CARE PLANNING - CONVERSATION DETAILS
Patient with limited insight into his medical history and current hospital course. Spoke with patient's wife who is surrogate decision maker. Palliative team introduced and role in patient's care explained. Mame understands patient's heart is weak and expressed frustration with length of patient's hospital stay and wishes for patient to come home as soon as possible. Discussed that work up is still ongoing to determine possible interventions pt may be a candidate for in setting of advanced heart failure, CAD and VT. She understands as she has been receiving updates from heart failure team but remains eager to find out what the exact plan will be and when pt can go home. At this time pt remains full code with no limitations to interventions. Extensive support provided.    GaP team will sign off. Reconsult as needed.    Kathi Torres MD  Geriatrics and Palliative Medicine Attending  [facility] pager: [phone]

## 2023-11-10 NOTE — PROGRESS NOTE ADULT - ASSESSMENT
57 yo Male with prior Stroke with mild ?left sided deficits (improved), HTN, DM2, vertigo, HLD, Mobitz II s/pp Medtronic dual chamber ICD (12/21/22) initially presented to [facility] from home with complaints of dyspnea and chest pain and was admitted w/ acute hypoxic respiratory failure likely due to acute pulmonary edema due to acute HFrEF in setting of acute NSTEMI, LAURA and enterovirus infection. Pt with brief MICU stay at [facility] requiring bipap (no intubation), was treated for PNA with cefepime, and was found to have TTE done 9/26/23 showing EF 20% with severely decreased LVSF, multiple regional wall motion abnormalities, and elevated LV end diastolic pressure. Pt was transferred to [facility] for cardiac eval.   TTE EF < 20%  RHC 10/19: RA 10, RV 47/9/13, Wedge 29, PA 41/26/33, CO/CI 4.4/2.3, PA sat 57.3  EKG: sinus tachycardia, septal q-waves, left axis deviation   TTE 10/16/23: LV 5.5 cm, LVEF 20% with regional WMAs worse in the apex, LVOT VTI 8 cm, normal RV size/function, severe functional MR, small pericardial effusion, estimated RA pressure 8 mmHg   TTE 12/2022: normal LVEF   LHC: 95% discrete proximal LAD disease and sequential multifocal disease with good distal target, 80-90% proximal LCx disease just prior to marginals, severe multifocal RCA disease with good targets   A1c 8.4    CD 10/17 neg   NIHSS 1  CTH with age indeterminate R cerebellar infarct.  likely chronic   CTA with mod L MCA stenosis, R VA occlusion, severe L VA stenosis   MRi brain with eovlving acute/subacute R PICA infarct with mild edema and mild hemorrhagic transformation. 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  occluded R VA. severe L VA stenosis   CTH stable   RRT/code stroke on 11/6 in setting of SBP 80s/50s   CTH stable. old R cerebellar infarct. CTA with severe L VA stenosis ; R VA occlusion   11/8 RRT transferred to CCU. no neuro changes     no neuro changes     Impression:   R cerebellar/PICA infarct as well as L parietal embolic infarct, likely cardioembolic   worsening symptoms in setting of hypotension,m now improved       - reached out to neuro IR for any role of stenting his L VA given his R VA occlusion and severe L VA stenosis.  may change decision on cardiac management if we can revascularize.   reached out to Neuro IR fellow, awaiting call back after review. --. if there is plan for intervention he would benefit from MR NOVA (will order): spoke with neurosx who will also f/u   - -  patient will be high risk from neuro standpoint for cardiac intervention however benefits at this point seem to outweigh risks.  would consider PCI staging next week if repeat CTH stable.  triple therapy will be needed    - keep SBP > 100; becomes symptomatic when BP drops   - c/o SOB ; f/u cardio and pulmo   - f/u heart failure team   - can consider routine EEG but low yield   - CTS eval for CABG given 3 vessel disease vs high risk PCI --> not a CABG candidate ; possible PCI planning but would need "triple therapy"   - c/w asa and statin therpay for secondary stroke prevention.   - no objection to heparin drip for low EF and LV thrombus  --> no objection to transition to coumadin ; hep to coumadin bridge ; coumadin per INR   - called for risk stratification for heart transplant eval,  low-mod risk from stroke standpoint and no objection   - b12, TSH, RPR for reversible causes of dementia   - telemetry  - PT/OT   - check FS, glucose control <180  - GI/DVT ppx   - Thank you for allowing me to participate in the care of this patient. Call with questions.   spoke with primary team     Abelardo Irving MD  Vascular Neurology  Office: [phone]

## 2023-11-10 NOTE — PROGRESS NOTE ADULT - ASSESSMENT
57 y/o M PMH CVA with mild left sided deficits, HTN, DM2, vertigo, HLD, Mobitz II s/p Medtronic dual chamber primary prevention ICD (12/21/22) initially presented to OSH for SOB c/f ADHF vs. PNA, later found to have NSTEMI transferred to [facility] for LHC evaluation. 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 Readmitted to CICU 11/8 for sustained MMVT on the floor, ~150bpm falling into monitor zone w/o therapy delivered and spontaneously terminated. He was started on Amiodarone load on 11/8. On 11/8, he continued to have frequent MM nonsustained VT, and also sustained MMVT @ rate ~150bpm w/ spontaneous termination.    1) HFrEF   2) CAD  3) VT    Recommendations:  -Initiate Amio load 400mg q8h to 10gm followed by 200mg qd thereafter. Patient has received 2000mg so far. Will re-evaluate arrhythmia burden once fully loaded with Amio for further recs   -While on Amio, needs monitoring of LFT's and TFT's. Will need monitoring of PFT's and yearly opthalmologic eval as outpatient as well   -HF following, currently holding off on launching VAD/transplant eval given cognitive issues  -HF to discuss w/ IC regarding PCI with MCS back up as possible BTT/BTVad  -Will re-eval ablation pending continued Amio load and decision re: revascularization/AT  -Continue tele monitoring   -Replete lytes for K>4 and Mg>2    Note not final until signed by attending       Ivan Ly MD  Cardiology Fellow PGY-6  Phone: [phone]    For all New Consults  [email or web address]   Login: [employer]

## 2023-11-10 NOTE — PROGRESS NOTE ADULT - ATTENDING COMMENTS
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 Had CVA at that time.  Admitted now with NSTEMI. Found to have high grade LAD stenosis and severe RCA disease.  Viability study via CMRI showed >50% LGE in LAD territory. + LV thrombus.   During this stay had a new CVA in PICA territory with mild hemorrhagic transformation as well as embolic appearing parietal stroke.   Plans for revascularization were halted due to this. He has been diuresed and started on low dose GDMT.    On exam he appears dry to me with low JVP, no peripheral edema and systemic hypotension.     *On 11/6 after my evaluation a rapid was called due to dizziness and hypotension. A CT was repeated showing occlusion of the non dominant vertebral artery and high grade stenosis of the dominant. He received contrast for this study. Symptoms were deemed to be due to hypotension.    On 11/7 transferred to CICU for sustained VT that was self limited   On 11/8 while receiving PO Amiodarone load he developed a new episode of MMVT  These episodes are in his monitor zone and were not treated    Main issues:   1. Severe cerebrovascular disease - mainly vertebral arteries  2. MVCAD - not amenable for revasc (>50% LGE in anterolateral wall)  3. LV thrombus - smaller after a/c but still present   4. MMVT - EP following on Amiodarone ?amenable for ablation if LVT resolves   5. HFrEF - tolerating minimal GDMT   6. Abnormal CT chest - possibly related to pulmonary edema   7. LAURA on CKD    - Stop Bumex   - Start Farxiga 10 mg daily  - Amiodarone per EP   - Toprol XL 25 mg   - Continue heparin gtt and ASA (INR 1.6) for LVT  - Hydralazine 75 and Isordil 30   - Continue Digoxin (Level 1.3 on 11/3)  - Raad Cr was ~1.6-1.8 when close to this, we should start ARNI low dose  - MRI per Neuro IR to evaluate vertebral circulation  - PT evaluation      Maikel Spring MD

## 2023-11-10 NOTE — PROGRESS NOTE ADULT - ATTENDING SUPERVISION STATEMENT
Fellow
Resident
Fellow
Resident
Resident
Fellow
Resident
Fellow

## 2023-11-10 NOTE — PROGRESS NOTE ADULT - SUBJECTIVE AND OBJECTIVE BOX
Patient seen and examined at bedside.    Overnight Events: AMI, spoke to IC team, pt not a good candidate for palliative PCI as significant LGE and pt would need ECMO for support which is very high risk in pt with recent strokes / LV thrombus / severe cerebrovascular disease. EP not considering ablation until pt fully loaded 10g with Amio. AT eval likely not an option, will be discussed with wife today. Change bumex to 2 PO Qday, will plan on cardiomems implantation prior to DC    Review of Systems: Negative except as per HPI     Current Meds:  aMIOdarone    Tablet   Oral   aMIOdarone    Tablet 400 milliGRAM(s) Oral every 8 hours  aspirin enteric coated 81 milliGRAM(s) Oral daily  atorvastatin 80 milliGRAM(s) Oral at bedtime  buMETAnide Injectable 2 milliGRAM(s) IV Push daily  chlorhexidine 2% Cloths 1 Application(s) Topical <User Schedule>  heparin  Infusion 1100 Unit(s)/Hr IV Continuous <Continuous>  hydrALAZINE 75 milliGRAM(s) Oral every 8 hours  insulin glargine Injectable (LANTUS) 15 Unit(s) SubCutaneous at bedtime  insulin lispro (ADMELOG) corrective regimen sliding scale   SubCutaneous three times a day before meals  insulin lispro (ADMELOG) corrective regimen sliding scale   SubCutaneous at bedtime  insulin lispro Injectable (ADMELOG) 6 Unit(s) SubCutaneous three times a day before meals  isosorbide   dinitrate Tablet (ISORDIL) 30 milliGRAM(s) Oral three times a day  metoprolol succinate ER 25 milliGRAM(s) Oral daily  pantoprazole    Tablet 40 milliGRAM(s) Oral before breakfast  polyethylene glycol 3350 17 Gram(s) Oral daily PRN  senna 2 Tablet(s) Oral at bedtime  tamsulosin 0.4 milliGRAM(s) Oral at bedtime      Vitals:  T(F): 98.2 (11-10), Max: 98.2 (11-09)  HR: 101 (11-10) (94 - 106)  BP: 102/89 (11-10) (98/64 - 112/75)  RR: 18 (11-10)  SpO2: 96% (11-10)  I&O's Summary    09 Nov 2023 07:01  -  10 Nov 2023 07:00  --------------------------------------------------------  IN: 220 mL / OUT: 100 mL / NET: 120 mL    10 Nov 2023 07:01  -  10 Nov 2023 15:28  --------------------------------------------------------  IN: 320 mL / OUT: 0 mL / NET: 320 mL        Physical Exam:  Appearance: No acute distress; well appearing  Eyes: PERRL, EOMI, pink conjunctiva  HEENT: Normal oral mucosa  Cardiovascular: RRR, S1, S2, no murmurs, rubs, or gallops; no edema; no JVD  Respiratory: Clear to auscultation bilaterally  Gastrointestinal: soft, non-tender, non-distended with normal bowel sounds  Musculoskeletal: No clubbing; no joint deformity   Neurologic: Non-focal  Lymphatic: No lymphadenopathy  Psychiatry: AAOx3, mood & affect appropriate  Skin: No rashes, ecchymoses, or cyanosis                          10.9   6.23  )-----------( 259      ( 10 Nov 2023 06:32 )             36.4     11-10    138  |  102  |  46<H>  ----------------------------<  191<H>  4.7   |  22  |  2.38<H>    Ca    9.1      10 Nov 2023 06:32  Phos  4.3     11-10  Mg     2.2     11-10    TPro  6.3  /  Alb  3.3  /  TBili  0.4  /  DBili  x   /  AST  18  /  ALT  34  /  AlkPhos  86  11-10    PT/INR - ( 10 Nov 2023 06:32 )   PT: 23.1 sec;   INR: 2.15 ratio         PTT - ( 10 Nov 2023 06:32 )  PTT:75.2 sec  CARDIAC MARKERS ( 07 Nov 2023 23:17 )  550 ng/L / x     / x     / 49 U/L / x     / 3.4 ng/mL

## 2023-11-10 NOTE — PROCEDURE NOTE - ADDITIONAL PROCEDURE DETAILS
Indication: Pre MRI reprogramming  - normal sensing and pacing thresholds  - stable lead impedances  - Changes Made: Patient placed in MRI sure scan, mode changed to ODO  - Call post MRI to reprogram
Reason for interrogation: assess for PPM dependency   Findings: patient is not PPM dependent, rather conducting 1:1 in SR/-120's   Paced/sensed AV delays brought to 250ms from 180/150ms prior   Mode switched to AAI/DDD 50/130
1. Battery longevity 8.7 years  2. Lead impedances WNL  3. Sensing and pacing thresholds stable  4. Apaced 0.1%, Vpaced 91.3%  5. Underlying rhythm is Sinus Tachycardia 100-110s; Tele Asense Vpaced 100-110s  6. One nonsustained episode on 10/4/23 for 1 sec of NSVT at 255 bpm, no therapy  7. Five AT/AF episodes stored on 9/26/23 for Atach, rates of 164-171 bpm, duration 3-12 mins  8. One SVT episode stored on 8/28/23 at 171 bpm, lasting 12 secs    #[id number]
Indication: Post MRI reprogramming  - normal sensing and pacing thresholds  - stable lead impedances  - Changes Made: MRI sure scan mode turned off.  Patient returned to pre MRI settings
Reason for interrogation: post MRI reprogramming  MRI surescan mode turned off  Device interrogated. All parameters and findings remain same or similar to pre-MRI  Normal device function
Reason for interrogation: MRI reprogramming  Date of implant: 12/21/22  Battery: 8.8 years  Changes: MRI surescan mode turned on ODO  Patient's intrinsic rhythm is sinus tach
PA cath pressures:  RA 9  RV 50/7  PA 52/28  PCWP 31

## 2023-11-10 NOTE — PROGRESS NOTE ADULT - SUBJECTIVE AND OBJECTIVE BOX
Neurology        S: patient seen and examined.  no neuro changes.         Medications: MEDICATIONS  (STANDING):  aMIOdarone    Tablet   Oral   aMIOdarone    Tablet 400 milliGRAM(s) Oral every 8 hours  aspirin enteric coated 81 milliGRAM(s) Oral daily  atorvastatin 80 milliGRAM(s) Oral at bedtime  buMETAnide Injectable 2 milliGRAM(s) IV Push daily  heparin  Infusion 1100 Unit(s)/Hr (8 mL/Hr) IV Continuous <Continuous>  hydrALAZINE 75 milliGRAM(s) Oral every 8 hours  insulin glargine Injectable (LANTUS) 15 Unit(s) SubCutaneous at bedtime  insulin lispro (ADMELOG) corrective regimen sliding scale   SubCutaneous three times a day before meals  insulin lispro (ADMELOG) corrective regimen sliding scale   SubCutaneous at bedtime  insulin lispro Injectable (ADMELOG) 6 Unit(s) SubCutaneous three times a day before meals  isosorbide   dinitrate Tablet (ISORDIL) 30 milliGRAM(s) Oral three times a day  metoprolol succinate ER 25 milliGRAM(s) Oral daily  pantoprazole    Tablet 40 milliGRAM(s) Oral before breakfast  senna 2 Tablet(s) Oral at bedtime  tamsulosin 0.4 milliGRAM(s) Oral at bedtime    MEDICATIONS  (PRN):  polyethylene glycol 3350 17 Gram(s) Oral daily PRN Constipation       Vitals:  Vital Signs Last 24 Hrs  T(C): 36.8 (10 Nov 2023 11:19), Max: 36.8 (09 Nov 2023 17:50)  T(F): 98.2 (10 Nov 2023 11:19), Max: 98.2 (09 Nov 2023 17:50)  HR: 96 (10 Nov 2023 11:19) (94 - 106)  BP: 99/96 (10 Nov 2023 11:19) (97/58 - 112/75)  BP(mean): 72 (09 Nov 2023 13:00) (72 - 72)  RR: 18 (10 Nov 2023 11:19) (18 - 28)  SpO2: 96% (10 Nov 2023 11:19) (93% - 98%)    Parameters below as of 10 Nov 2023 11:19  Patient On (Oxygen Delivery Method): nasal cannula  O2 Flow (L/min): 2          General Exam:   General Appearance: Appropriately dressed and in no acute distress       Head: Normocephalic, atraumatic and no dysmorphic features  Ear, Nose, and Throat: Moist mucous membranes  CVS: S1S2+  Resp: No SOB, no wheeze or rhonchi  GI: soft NT/ND  Extremities: No edema or cyanosis  Skin: No bruises or rashes     Neurological Exam:  Mental Status: Awake, alert and oriented x 2.  Able to follow simple verbal commands. Able to name and repeat. fluent speech. No obvious aphasia or dysarthria noted. poor insight. not understanding why he is in hospital   Cranial Nerves: PERRL, EOMI, VFFC, sensation V1-V3 intact,  no obvious facial asymmetry, equal elevation of palate, scm/trap 5/5, tongue is midline on protrusion. no obvious papilledema on fundoscopic exam. hearing is grossly intact.   Motor: Normal bulk, tone and strength throughout. Fine finger movements were intact and symmetric. no tremors or drift noted.    Sensation: Intact to light touch and pinprick throughout. no right/left confusion. no extinction to tactile on DSS. Romberg was negative.   Reflexes: 1+ throughout at biceps, brachioradialis, triceps, patellars and ankles bilaterally and equal. No clonus. R toe and L toe were both downgoing.  Coordination: No dysmetria on FNF or HKS  Gait:   no limitations in gait.     Data/Labs/Imaging which I personally reviewed.         LABS:                          10.9   6.23  )-----------( 259      ( 10 Nov 2023 06:32 )             36.4     11-10    138  |  102  |  46<H>  ----------------------------<  191<H>  4.7   |  22  |  2.38<H>    Ca    9.1      10 Nov 2023 06:32  Phos  4.3     11-10  Mg     2.2     11-10    TPro  6.3  /  Alb  3.3  /  TBili  0.4  /  DBili  x   /  AST  18  /  ALT  34  /  AlkPhos  86  11-10    LIVER FUNCTIONS - ( 10 Nov 2023 06:32 )  Alb: 3.3 g/dL / Pro: 6.3 g/dL / ALK PHOS: 86 U/L / ALT: 34 U/L / AST: 18 U/L / GGT: x           PT/INR - ( 10 Nov 2023 06:32 )   PT: 23.1 sec;   INR: 2.15 ratio         PTT - ( 10 Nov 2023 06:32 )  PTT:75.2 sec  Urinalysis Basic - ( 10 Nov 2023 06:32 )    Color: x / Appearance: x / SG: x / pH: x  Gluc: 191 mg/dL / Ketone: x  / Bili: x / Urobili: x   Blood: x / Protein: x / Nitrite: x   Leuk Esterase: x / RBC: x / WBC x   Sq Epi: x / Non Sq Epi: x / Bacteria: x            x           < from: CT Head No Cont (10.27.23 @ 18:04) >    ACC: [id number] EXAM:  CT BRAIN   ORDERED BY: BRETT HOPSON     PROCEDURE DATE:  10/27/2023          INTERPRETATION:  Clinical Indication: CVA    5mm axial sections of the brain were obtained from base to vertex,   without the intravenous administration of contrast material. Coronal and   sagittal computer generated reconstructed views are available.    No prior brain imaging is available for comparison.          The ventricles and sulci are prominent consistent with mild atrophy.   Small vesselwhite matter ischemic changes are noted. There is a infarct   in the right medial cerebellum of undetermined age which could be acute   to subacute based on its degree of lucency. There is no significant   hemorrhage. Bone window examination is unremarkable. There is been   previous right-sided lens replacement surgery.      IMPRESSION: Atrophy and small vessel white matter ischemic changes. Right   medial cerebellar infarct may be acute versus subacute. No hemorrhage.      --- End of Report ---    < from: MR Angio Head No Cont (10.30.23 @ 20:58) >    ACC: [id number] EXAM:  MR ANGIO BRAIN   ORDERED BY: NATALIE CARRANZA     ACC: [id number] EXAM:  MR ANGIO NECK   ORDERED BY: NATALIE CARRANZA     ACC: [id number] EXAM:  MR BRAIN   ORDERED BY: NAVNEET CORONADO     PROCEDURE DATE:  10/30/2023          INTERPRETATION:  .    CLINICAL INFORMATION: Stroke.    TECHNIQUE: Multiplanar multi sequential MRI examination of the brain was   performed without the administration of IV gadolinium. MRA images through   the neck and [place] of [place] were obtained usinga combination of 2-D   and 3-D time-of-flight acquisition. The data was then reformatted into a   volumetric data set and reviewed as rotational MIP images.    COMPARISON: Most recent prior CT examination of the head from 10/27/2023.   No prior MRI studies are available for comparison.    FINDINGS:    MRI Brain: A wedge-shaped area of diffusion restriction is seen within   the right PICA territory. Associated T2/FLAIR hyperintense signal is also   seen, compatible with cytotoxic edema. Mild hemorrhagic transformation is   seen within the infarct bed manifested by high signal on T1-weighted   imaging as well as susceptibility artifact on the SWI sequence. Mild mass   effect is seen without effacement of the fourth ventricle or shift of the   posterior fossa midline structures.    This is superimposed upon encephalomalacia and gliosis involving the   right cerebellar hemisphere.    An additional vague area of diffusion reduction is seen within the left   parietal periventricular white matter without associated T2/FLAIR   hyperintense signal, compatible with cytotoxic edema. No gross   hemorrhagic transformation is noted in this location.    Scattered foci of susceptibility artifact are seen within the bilateral   basal ganglia, compatible with areas of chronic microhemorrhage.    Multiple additional patchy confluent nonspecific T2/FLAIR hyperintense   signal changes are noted throughout the bihemispheric white matter   without associated mass effect or restricted diffusion.    Ventricular size and configuration is unremarkable. No abnormal   extra-axial fluid collections are seen. Flow-voids are noted throughout   the major intracranial vessels, on the T2 weighted images, consistent   with their patency. The sellar location appears unremarkable. There is an   unchanged appearing small retrocerebellar arachnoid cyst versus cisterna   magna.    A polyp versus retention cyst is seen within the right maxillary sinus.   Additional minor scattered mucosal thickening seen throughout the ethmoid   labyrinth. The tympanomastoid cavities are clear. The left orbit appears   within normal limits. There is evidence of right-sided cataract removal.    There is an indeterminate mixed signal ovoid shaped soft tissue focus   involving the left superior pinna measuring 1.4 x 0.9 cm which appears   unchanged.    MRA Neck: Examination is motion limited.    There is a standard anatomic variation to the aortic arch. The origins of   the great vessels appear grossly unremarkable.    The bilateral common carotid arteries, carotid bifurcations and cervical   internal carotid arteries appear patent. The origin of the left vertebral   artery is normal. The left vertebral artery is patent.    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    MRA [place] of [place]: Atherosclerosis affects the bilateral carotid   siphons with mild area of focal stenosis involving the right   clinoid/supraclinoid junction. There is mild hypoplasia of the right A1   segment. Otherwise, the bilateral intracranial internal carotid,   anterior, and middle cerebral arteries appear unremarkable.    The anterior and bilateral posterior communicating arteries are seen.    The right V4 segment does not demonstrate flow relatedsignal. The left   V4 segment appears unremarkable. The vertebrobasilar confluence appears   unremarkable. Long segment mild stenosis involving the mid basilar   artery. The basilar tip appears unremarkable as well as the bilateral   posterior cerebral arteries.    IMPRESSION:    MRI BRAIN:  1. Evolving acute/subacute right-sided PICA distribution infarction with   associated cytotoxic edema and very mild hemorrhagic transformation.  2. Additional wedge-shaped area of acute/subacute ischemia within the   left parietal periatrial white matter with associated cytotoxic edema.  3. Indeterminate soft tissue lesion involving the left superior pinna   measuring 1.4 x 0.9 cm. Neoplasm cannot be excluded. Recommend   correlation with direct physical examination and visualization.    MRA NECK:  1. Extremely motion limited study.  2. 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 Recommend further evaluation with a CT angiogram study   of the neck.    MRA HEAD:  1. 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 This can be further evaluated with a CT angiogram study of the   head.  2. Mild focal stenosis of the right supraclinoid intracranial internal   carotid artery secondary to atherosclerosis.  3. Otherwise, no large vessel occlusion or major stenosis.    --- End of Report ---      < from: CT Brain Stroke Protocol (11.06.23 @ 14:11) >    ACC: [id number] EXAM:  CT BRAIN STROKE PROTOCOL   ORDERED BY:  KAMILA TAVAREZ     ACC: [id number] EXAM:  CT ANGIO NECK STROKE PROTCL IC   ORDERED BY:  KAMILA TAVAREZ     ACC: [id number] EXAM:  CT ANGIO BRAIN STROKE PROTC IC   ORDERED BY:  KAMILA TAVAREZ     PROCEDURE DATE:  11/06/2023          INTERPRETATION:  Clinical Indication: Code stroke for dizziness, prior   infarct one week ago    5mm axial sections of the brain were obtained from base to vertex,   without the intravenous administration of contrast material. Coronal and   sagittal computer generated reconstructed views are available.    Comparison is made with the prior CT of 10/27/2023 and the MRI 10/30/2023.    There is moderate atrophy for the patient's age with ventricular and   sulcal prominence. Small vessel white matter ischemic changes are noted.   There is an old right medial occipital infarct. No acute hemorrhage is   identified.        After the intravenous power injection of 70 cc of Omnipaque 300 using a   bolus amol timing run serial thin sections were obtained through the   neck from the thoracic inlet through the intracranial circulation   centered at the prlrbx-so-Eoxyum on a multislice CT scanner reformatted   with coronal and sagittal 2 D-MIP projections, including 3 D  reconstructions using a separate 3D [employer] software workstation.A total   of  70 cc of Omnipaque were intravenously injected.  30 cc were discarded.    The origins of the common carotid arteries are normal. The origin of the   left dominant left vertebral artery demonstrates moderate stenosis   nondominant small right vertebral artery appears hypoplastic on a   congenital basis and is occluded at the V3 and V4 portion.    There is severe stenosis of the V4 segment of the left vertebral artery   just proximal to the VV junction. The basilar artery is normal. The   posterior cerebral and superior cerebellar arteries are normal.    Evaluation of the carotid arteries demonstrate normal appearance to   distal cervical, petrous, cavernous and supraclinoid internal carotid   arteries. The anterior cerebral arteries and anterior communicating   artery are visualized, the right A1 segment is hypoplastic on a   congenital basis. The middle cerebral arteries are visualized, the left   M1 segment is moderately narrowed but patent. The middle cerebral artery   vessels in the sylvian fissure unremarkable.      The normal intracranial venous circulation is identified. The right   transverse sinus is dominant. The superior sagittal sinus, internal   cerebral veins, vein of Jeremías, straight sinus, transverse sinuses,   sigmoid sinuses and internal jugular veins are normal.  Cortical veins are normal.      There are large bilateral pleural effusions and consolidation in the left   upper lobe whichis similar to a prior exam of 11/5/2023. There is a   left-sided permanent pacemaker.    IMPRESSION: Atrophy for the patient's age. Old right cerebellar infarct.   No hemorrhage. No change since 10/30/2023.    Severe stenosis of the dominant left vertebral artery at the V4 segment   and moderate stenosis at the origin. 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 Moderate left M1 stenosis.    --- End of Report ---            ANDREW TONEY MD; Attending Radiologist  This document has been electronically signed. Nov 6 2023  2:35PM    < end of copied text >        LAKESHA DIAZ MD; Attending Radiologist  This document has been electronically signed. Oct 30 2023  9:20PM    < end of copied text >

## 2023-11-10 NOTE — PROGRESS NOTE ADULT - PROBLEM SELECTOR PLAN 2
s/p LHC-> 95% discrete proximal LAD disease and sequential multifocal disease with good distal target, 80-90% proximal LCx disease just prior to marginals, severe multifocal RCA. Limited viability on MRI  - EF <20%  - Not a candidate for CABG  - Plans- c/w ASA, statin, Metoprolol, IV heparin gtt  - may need high risk PCI, cardiology consulted and following

## 2023-11-10 NOTE — PROGRESS NOTE ADULT - SUBJECTIVE AND OBJECTIVE BOX
[facility] DIVISION OF PULMONARY, CRITICAL CARE and SLEEP MEDICINE  PULMONARY PROGRESS NOTE  OFFICE NUMBER: [phone]    PATIENT INFORMATION:  NAME: BRANDEN MARRUFO:  MRN: [id number]    CHIEF COMPLAINT: Patient is a 58y old  Male who presents with a chief complaint of NSTEMI (10 Nov 2023 12:58)      [x] INITIAL CONSULT, H&P, FAMILY HISTORY and PAST MEDICAL AND SURGICAL HISTORY REVIEWED    OVERNIGHT EVENTS or CHANGES TO HPI:   - Patient seen earlier this AM on [facility]  - Breathing comfortably on 2L NC  - CXR done yesterday not yet officially read but appears to have increased pulmonary edema  - Patient denies dyspnea  - this AM was on Bumex BID but on review of meds now he is on daily    ========================REVIEW OF SYSTEMS========================  CONSTITUTIONAL: No acute complaints   CARDIOVASCULAR: Denies chest pain  PULMONARY: Denies cough or shortness of breath  [X] REMAINING REVIEW OF SYSTEMS NEGATIVE  [] UNABLE TO OBTAIN REVIEW OF SYSTEMS DUE TO _______________    ========================MEDICATIONS=============================  MEDICATIONS  (STANDING):  aMIOdarone    Tablet   Oral   aMIOdarone    Tablet 400 milliGRAM(s) Oral every 8 hours  aspirin enteric coated 81 milliGRAM(s) Oral daily  atorvastatin 80 milliGRAM(s) Oral at bedtime  buMETAnide Injectable 2 milliGRAM(s) IV Push daily  chlorhexidine 2% Cloths 1 Application(s) Topical <User Schedule>  heparin  Infusion 1100 Unit(s)/Hr (8 mL/Hr) IV Continuous <Continuous>  hydrALAZINE 75 milliGRAM(s) Oral every 8 hours  insulin glargine Injectable (LANTUS) 15 Unit(s) SubCutaneous at bedtime  insulin lispro (ADMELOG) corrective regimen sliding scale   SubCutaneous at bedtime  insulin lispro (ADMELOG) corrective regimen sliding scale   SubCutaneous three times a day before meals  insulin lispro Injectable (ADMELOG) 6 Unit(s) SubCutaneous three times a day before meals  isosorbide   dinitrate Tablet (ISORDIL) 30 milliGRAM(s) Oral three times a day  metoprolol succinate ER 25 milliGRAM(s) Oral daily  pantoprazole    Tablet 40 milliGRAM(s) Oral before breakfast  senna 2 Tablet(s) Oral at bedtime  tamsulosin 0.4 milliGRAM(s) Oral at bedtime      MEDICATIONS  (PRN):  polyethylene glycol 3350 17 Gram(s) Oral daily PRN Constipation      ========================PHYSICAL EXAM============================    VITALS: ICU Vital Signs Last 24 Hrs  T(C): 36.8 (10 Nov 2023 11:19), Max: 36.8 (09 Nov 2023 17:50)  T(F): 98.2 (10 Nov 2023 11:19), Max: 98.2 (09 Nov 2023 17:50)  HR: 101 (10 Nov 2023 13:15) (94 - 106)  BP: 102/89 (10 Nov 2023 13:15) (98/64 - 112/75)  RR: 18 (10 Nov 2023 11:19) (18 - 22)  SpO2: 96% (10 Nov 2023 11:19) (94% - 98%)    O2 Parameters below as of 10 Nov 2023 11:19  Patient On (Oxygen Delivery Method): nasal cannula  O2 Flow (L/min): 2    INTAKE and OUTPUT: I&O's Summary    09 Nov 2023 07:01  -  10 Nov 2023 07:00  --------------------------------------------------------  IN: 220 mL / OUT: 100 mL / NET: 120 mL    10 Nov 2023 07:01  -  10 Nov 2023 13:42  --------------------------------------------------------  IN: 120 mL / OUT: 0 mL / NET: 120 mL    VENTILATOR SETTINGS: N/A    GENERAL: awake, alert, interactive, NAD  EYES: anicteric  EAR/NOSE/MOUTH/THROAT: NCAT, MMM, nares clear, no thrush  NECK: supple    CARDIOVASCULAR: RRR, S1S2  RESPIRATORY: decreased BS bases, crackles laterally, no wheeze, no accessory muscle use  ABDOMEN: soft, distended, +HJR  EXTREMITIES: no clubbing or cyanosis, no LE edema  SKIN: warm and dry   MUSCULOSKELETAL: strength intact   PSYCHIATRIC calm    ========================LABORATORY RESULTS AND IMAGING=============                        10.9   6.23  )-----------( 259      ( 10 Nov 2023 06:32 )             36.4                                                    11-10    138  |  102  |  46<H>  ----------------------------<  191<H>  4.7   |  22  |  2.38<H>    Ca    9.1      10 Nov 2023 06:32  Phos  4.3     11-10  Mg     2.2     11-10    TPro  6.3  /  Alb  3.3  /  TBili  0.4  /  DBili  x   /  AST  18  /  ALT  34  /  AlkPhos  86  11-10      Creatinine Trend: 2.38<--, 2.12<--, 2.21<--, 2.19<--, 2.21<--, 2.24<--    CT CHEST:     [] RADIOLOGY REVIEWED AND INTERPRETED BY ME      THANK YOU FOR ALLOWING US TO PARTICIPATE IN THE CARE OF THIS PATIENT

## 2023-11-10 NOTE — PROGRESS NOTE ADULT - SUBJECTIVE AND OBJECTIVE BOX
[facility] Division of Hospital Medicine  Bernard Montelongo MD  Available via MS Teams    SUBJECTIVE / OVERNIGHT EVENTS: Patient seen and examined at bedside, resting comfortably and in no acute distress. Denies acute events overnight. Denies chest pain or palpitations. Denies syncope or near syncope. ROS otherwise noncontributory.     MEDICATIONS  (STANDING):  aMIOdarone    Tablet   Oral   aMIOdarone    Tablet 400 milliGRAM(s) Oral every 8 hours  aspirin enteric coated 81 milliGRAM(s) Oral daily  atorvastatin 80 milliGRAM(s) Oral at bedtime  buMETAnide Injectable 2 milliGRAM(s) IV Push daily  heparin  Infusion 1100 Unit(s)/Hr (8 mL/Hr) IV Continuous <Continuous>  hydrALAZINE 75 milliGRAM(s) Oral every 8 hours  insulin glargine Injectable (LANTUS) 15 Unit(s) SubCutaneous at bedtime  insulin lispro (ADMELOG) corrective regimen sliding scale   SubCutaneous three times a day before meals  insulin lispro (ADMELOG) corrective regimen sliding scale   SubCutaneous at bedtime  insulin lispro Injectable (ADMELOG) 6 Unit(s) SubCutaneous three times a day before meals  isosorbide   dinitrate Tablet (ISORDIL) 30 milliGRAM(s) Oral three times a day  metoprolol succinate ER 25 milliGRAM(s) Oral daily  pantoprazole    Tablet 40 milliGRAM(s) Oral before breakfast  senna 2 Tablet(s) Oral at bedtime  tamsulosin 0.4 milliGRAM(s) Oral at bedtime    MEDICATIONS  (PRN):  polyethylene glycol 3350 17 Gram(s) Oral daily PRN Constipation      I&O's Summary    09 Nov 2023 07:01  -  10 Nov 2023 07:00  --------------------------------------------------------  IN: 220 mL / OUT: 100 mL / NET: 120 mL    10 Nov 2023 07:01  -  10 Nov 2023 10:45  --------------------------------------------------------  IN: 120 mL / OUT: 0 mL / NET: 120 mL        PHYSICAL EXAM:  Vital Signs Last 24 Hrs  T(C): 36.7 (10 Nov 2023 04:32), Max: 36.8 (09 Nov 2023 17:50)  T(F): 98 (10 Nov 2023 04:32), Max: 98.2 (09 Nov 2023 17:50)  HR: 103 (10 Nov 2023 04:32) (99 - 106)  BP: 109/71 (10 Nov 2023 04:32) (97/58 - 112/75)  BP(mean): 72 (09 Nov 2023 13:00) (72 - 84)  RR: 18 (10 Nov 2023 04:32) (18 - 28)  SpO2: 94% (10 Nov 2023 04:32) (93% - 98%)    Parameters below as of 10 Nov 2023 04:32  Patient On (Oxygen Delivery Method): nasal cannula  O2 Flow (L/min): 2    CONSTITUTIONAL: NAD, well-groomed  EYES: PERRLA; conjunctiva and sclera clear  ENMT: Moist oral mucosa, no pharyngeal injection or exudates; LFNC in place  NECK: Supple, no palpable masses; no thyromegaly  RESPIRATORY: Normal respiratory effort; lungs are clear to auscultation bilaterally  CARDIOVASCULAR: normal S1 and S2, no murmur/rub/gallop; No lower extremity edema  ABDOMEN: Nontender to palpation, normoactive bowel sounds, no rebound/guarding; No hepatosplenomegaly  MUSCULOSKELETAL:  no clubbing or cyanosis of digits; no joint swelling or tenderness to palpation  PSYCH: A+Ox2  NEUROLOGY: CN 2-12 are intact and symmetric; no gross sensory deficits   SKIN: No rashes; no palpable lesions    LABS:                        10.9   6.23  )-----------( 259      ( 10 Nov 2023 06:32 )             36.4     11-10    138  |  102  |  46<H>  ----------------------------<  191<H>  4.7   |  22  |  2.38<H>    Ca    9.1      10 Nov 2023 06:32  Phos  4.3     11-10  Mg     2.2     11-10    TPro  6.3  /  Alb  3.3  /  TBili  0.4  /  DBili  x   /  AST  18  /  ALT  34  /  AlkPhos  86  11-10    PT/INR - ( 10 Nov 2023 06:32 )   PT: 23.1 sec;   INR: 2.15 ratio         PTT - ( 10 Nov 2023 06:32 )  PTT:75.2 sec      Urinalysis Basic - ( 10 Nov 2023 06:32 )    Color: x / Appearance: x / SG: x / pH: x  Gluc: 191 mg/dL / Ketone: x  / Bili: x / Urobili: x   Blood: x / Protein: x / Nitrite: x   Leuk Esterase: x / RBC: x / WBC x   Sq Epi: x / Non Sq Epi: x / Bacteria: x        SARS-CoV-2: NotDetec (06 Nov 2023 19:17)  SARS-CoV-2: NotDetec (03 Nov 2023 18:25)  SARS-CoV-2: NotDetec (24 Oct 2023 07:17)  SARS-CoV-2: NotDetec (20 Oct 2023 09:18)  COVID-19 PCR: NotDetec (14 Oct 2023 11:34)        RADIOLOGY & ADDITIONAL TESTS:  New Results Reviewed Today:   New Imaging Personally Reviewed Today:  New Electrocardiogram Personally Reviewed Today:  Prior or Outpatient Records Reviewed Today:    COMMUNICATION:  Care Discussed with Consultants/Other Providers and Details of Discussion:  Discussions with Patient/Family:  PCP Communication:

## 2023-11-10 NOTE — PROCEDURE NOTE - INTERROGATION NOTE: COMMENTS
Battery longevity estimated at 8.7 years
ICD Interrogation for Tachycardia on Telemetry
Battery longevity estimated at 8.7 years

## 2023-11-10 NOTE — PROGRESS NOTE ADULT - ASSESSMENT
58M DM2, HTN, HLD, prior CVA (2012) with mild L sided weakness presenting as a transfer from [facility] for NSTEMI on 10/13/23. His hospital course has been complicated by acute systolic dysfunction, LV thrombus, and possible acute stroke.  - He has had a LHC with triple vessel disease and a RHC with elevated filling pressures    #Acute Hypoxemic Respiratory Failure  - Continue supplemental O2 to maintain O2 sat > 90% - taper down FiO2 as able. Was on 2L this AM.  - Incentive spirometer and acapella to facilitate breathing   - Hypoxemia most likely related to his cardiovascular disease and pleural effusions  - Would recommend ABG if any change/worsening of his respiratory status or mental status to evaluate for hypercapnia    #Pleural Effusion and Pulmonary Edema  - Continued attempts at volume removal with close monitoring of I/O and daily weights. Needs more effective volume removal I think as only 100 cc of output are recorded over prior 24 hours if accurate  - From a pulmonary standpoint patient requires continued attempts at volume removal/diuresis  - No role for thoracentesis at this time as patient is breathing comfortably and has bilateral effusions in the setting of known cardiovascular disease  - He would have to be off AC if thoracentesis is needed in the future - currently on AC for LV thrombus with Heparin drip and Warfarin    #6 mm pulmonary nodule  - Attention to follow-up on repeat imaging in 3-6 months  - Patient does not have any history of smoking, fevers/night sweats, or reported B-symptoms    #Consolidations  - Patient reportedly treated for pneumonia at [facility] - though unclear duration of antibiotics  - Follow-up results of cultures sent initially - procalcitonin not elevated and sputum culture negative so now being monitored off antibiotics    #Cardiovascular disease  - Continue management as per cardiology - currently on diuresis as per Cardiology/HF  - From a pulmonary standpoint he needs continued diuresis - his most recent RHC from 10/30 notes continued elevation in PCWP.   - Continue AC for LV thrombus as per Cardiology  - Patient not a candidate for CABG - has triple vessel disease - remains on ASA and statin    #Prophylaxis  - DVT proph  - GI proph  - Aspiration precautions - this will be particularly important given his lethargy/forgetfulness at times  - Incentive spirometer and acapella    Patient's prognosis is guarded.  Please do not hesitate to contact the inpatient pulmonary consult team for any questions or concerns.

## 2023-11-10 NOTE — CONSULT NOTE ADULT - SUBJECTIVE AND OBJECTIVE BOX
p ([id number])     HPI: 58M on ASA81/Hep gtt. Hx DM2, Vertigo, Mobitz II heart block s/p Medtronic dual chamber ICD (2022, has "MRI" mode), admitted since 10/13 for acute hypoxic respiratory failure 2/2 HFrEF (EF 20%) iso NSTEMI, LAURA, and enterovirus. Known prior CVA w/ mild Lt sided deficits from previous admission at [facility]. Primary/neurology reaching out for consideration of stent in setting of Vertebrobasilar insufficiency. CTH/CTA&N 11/6 showed stable chronic Rt cerebellar infarct. Severe stenosis of dominant Lt VA at V4, occlusion of nondominant Rt VA at V3/V4. Moderate Lt M1 stenosis. No heme. Exam: very mild left facial o/w intact, AOx3 no drift GALLEGOS 5/5, SILT    --Anticoagulation:  aspirin enteric coated 81 milliGRAM(s) Oral daily  heparin  Infusion 1100 Unit(s)/Hr IV Continuous <Continuous>    =====================  PAST MEDICAL HISTORY   CVA (cerebrovascular accident)    T2DM (type 2 diabetes mellitus)    HTN (hypertension)    HLD (hyperlipidemia)      PAST SURGICAL HISTORY   S/P cystoscopy    S/P hernia repair      No Known Allergies      MEDICATIONS:  Antibiotics:    Neuro:    Other:  aMIOdarone    Tablet   Oral   aMIOdarone    Tablet 400 milliGRAM(s) Oral every 8 hours  atorvastatin 80 milliGRAM(s) Oral at bedtime  buMETAnide Injectable 2 milliGRAM(s) IV Push daily  hydrALAZINE 75 milliGRAM(s) Oral every 8 hours  insulin glargine Injectable (LANTUS) 15 Unit(s) SubCutaneous at bedtime  insulin lispro (ADMELOG) corrective regimen sliding scale   SubCutaneous three times a day before meals  insulin lispro (ADMELOG) corrective regimen sliding scale   SubCutaneous at bedtime  insulin lispro Injectable (ADMELOG) 6 Unit(s) SubCutaneous three times a day before meals  isosorbide   dinitrate Tablet (ISORDIL) 30 milliGRAM(s) Oral three times a day  metoprolol succinate ER 25 milliGRAM(s) Oral daily  pantoprazole    Tablet 40 milliGRAM(s) Oral before breakfast  polyethylene glycol 3350 17 Gram(s) Oral daily PRN  senna 2 Tablet(s) Oral at bedtime  tamsulosin 0.4 milliGRAM(s) Oral at bedtime      SOCIAL HISTORY:   Occupation:   Marital Status:     FAMILY HISTORY:      ROS: Negative except per HPI    LABS:  PT/INR - ( 10 Nov 2023 06:32 )   PT: 23.1 sec;   INR: 2.15 ratio         PTT - ( 10 Nov 2023 06:32 )  PTT:75.2 sec                        10.9   6.23  )-----------( 259      ( 10 Nov 2023 06:32 )             36.4     11-10    138  |  102  |  46<H>  ----------------------------<  191<H>  4.7   |  22  |  2.38<H>    Ca    9.1      10 Nov 2023 06:32  Phos  4.3     11-10  Mg     2.2     11-10    TPro  6.3  /  Alb  3.3  /  TBili  0.4  /  DBili  x   /  AST  18  /  ALT  34  /  AlkPhos  86  11-10

## 2023-11-10 NOTE — PROGRESS NOTE ADULT - PROBLEM SELECTOR PLAN 1
- Etiology: ICM  - Last TTE: EF <20% rMWA  - Cath: : 95% discrete proximal LAD disease and sequential multifocal disease with good distal target, 80-90% proximal LCx disease just prior to marginals, severe multifocal RCA. Limited viability on MRI.   - GDMT:    > BB: MTP Succ 25 QDay     > ACE/ARB/ARNI: Stopped Entresto 24-26 BID. Cont HYdral 50mg/Isordil 30mg    > MRA: Stopped spironolactone 25 QDay     > SGLT2 I: Start farxiga 10mg QDay (11/10)   - Inotropes: Off milrinone since 11/1  - Diuretics: Bumex 2 IV BID (11/9) --> Bumex 2 PO QDay (11/10)  - Advanced therapies: severe LV dysfunction with tachycardia and poor non-invasive hemodynamics concerning, currently holding off on launching VAD/transplant eval given cognitive issues however will continue to reassess. Of note he has good support and no clear psychosocial red flags. ABO AB.   - F/U PT recs - need to be reconsulted 11/9  - Not a candidate for high risk PCI at this time   > Will plan on medical management of his CAD for now, cardiomems implantation monday, and possible outpatient PCI after pt rehabs from his strokes    - Please get MI for r/o PAD

## 2023-11-11 DIAGNOSIS — I65.09 OCCLUSION AND STENOSIS OF UNSPECIFIED VERTEBRAL ARTERY: ICD-10-CM

## 2023-11-11 LAB
ANION GAP SERPL CALC-SCNC: 13 MMOL/L — SIGNIFICANT CHANGE UP (ref 5–17)
ANION GAP SERPL CALC-SCNC: 13 MMOL/L — SIGNIFICANT CHANGE UP (ref 5–17)
APTT BLD: 90.9 SEC — HIGH (ref 24.5–35.6)
APTT BLD: 90.9 SEC — HIGH (ref 24.5–35.6)
APTT BLD: 98.8 SEC — HIGH (ref 24.5–35.6)
APTT BLD: 98.8 SEC — HIGH (ref 24.5–35.6)
BUN SERPL-MCNC: 49 MG/DL — HIGH (ref 7–23)
BUN SERPL-MCNC: 49 MG/DL — HIGH (ref 7–23)
CALCIUM SERPL-MCNC: 9 MG/DL — SIGNIFICANT CHANGE UP (ref 8.4–10.5)
CALCIUM SERPL-MCNC: 9 MG/DL — SIGNIFICANT CHANGE UP (ref 8.4–10.5)
CHLORIDE SERPL-SCNC: 101 MMOL/L — SIGNIFICANT CHANGE UP (ref 96–108)
CHLORIDE SERPL-SCNC: 101 MMOL/L — SIGNIFICANT CHANGE UP (ref 96–108)
CO2 SERPL-SCNC: 23 MMOL/L — SIGNIFICANT CHANGE UP (ref 22–31)
CO2 SERPL-SCNC: 23 MMOL/L — SIGNIFICANT CHANGE UP (ref 22–31)
CREAT SERPL-MCNC: 2.63 MG/DL — HIGH (ref 0.5–1.3)
CREAT SERPL-MCNC: 2.63 MG/DL — HIGH (ref 0.5–1.3)
EGFR: 28 ML/MIN/1.73M2 — LOW
EGFR: 28 ML/MIN/1.73M2 — LOW
GLUCOSE BLDC GLUCOMTR-MCNC: 169 MG/DL — HIGH (ref 70–99)
GLUCOSE BLDC GLUCOMTR-MCNC: 169 MG/DL — HIGH (ref 70–99)
GLUCOSE BLDC GLUCOMTR-MCNC: 192 MG/DL — HIGH (ref 70–99)
GLUCOSE BLDC GLUCOMTR-MCNC: 192 MG/DL — HIGH (ref 70–99)
GLUCOSE BLDC GLUCOMTR-MCNC: 195 MG/DL — HIGH (ref 70–99)
GLUCOSE BLDC GLUCOMTR-MCNC: 195 MG/DL — HIGH (ref 70–99)
GLUCOSE BLDC GLUCOMTR-MCNC: 196 MG/DL — HIGH (ref 70–99)
GLUCOSE BLDC GLUCOMTR-MCNC: 196 MG/DL — HIGH (ref 70–99)
GLUCOSE BLDC GLUCOMTR-MCNC: 210 MG/DL — HIGH (ref 70–99)
GLUCOSE BLDC GLUCOMTR-MCNC: 210 MG/DL — HIGH (ref 70–99)
GLUCOSE BLDC GLUCOMTR-MCNC: 233 MG/DL — HIGH (ref 70–99)
GLUCOSE BLDC GLUCOMTR-MCNC: 233 MG/DL — HIGH (ref 70–99)
GLUCOSE BLDC GLUCOMTR-MCNC: 254 MG/DL — HIGH (ref 70–99)
GLUCOSE BLDC GLUCOMTR-MCNC: 254 MG/DL — HIGH (ref 70–99)
GLUCOSE SERPL-MCNC: 182 MG/DL — HIGH (ref 70–99)
GLUCOSE SERPL-MCNC: 182 MG/DL — HIGH (ref 70–99)
HCT VFR BLD CALC: 35.4 % — LOW (ref 39–50)
HCT VFR BLD CALC: 35.4 % — LOW (ref 39–50)
HCT VFR BLD CALC: 36.3 % — LOW (ref 39–50)
HCT VFR BLD CALC: 36.3 % — LOW (ref 39–50)
HGB BLD-MCNC: 10.7 G/DL — LOW (ref 13–17)
HGB BLD-MCNC: 10.7 G/DL — LOW (ref 13–17)
HGB BLD-MCNC: 10.8 G/DL — LOW (ref 13–17)
HGB BLD-MCNC: 10.8 G/DL — LOW (ref 13–17)
INR BLD: 2.35 RATIO — HIGH (ref 0.85–1.18)
INR BLD: 2.35 RATIO — HIGH (ref 0.85–1.18)
MCHC RBC-ENTMCNC: 24.7 PG — LOW (ref 27–34)
MCHC RBC-ENTMCNC: 24.7 PG — LOW (ref 27–34)
MCHC RBC-ENTMCNC: 25 PG — LOW (ref 27–34)
MCHC RBC-ENTMCNC: 25 PG — LOW (ref 27–34)
MCHC RBC-ENTMCNC: 29.8 GM/DL — LOW (ref 32–36)
MCHC RBC-ENTMCNC: 29.8 GM/DL — LOW (ref 32–36)
MCHC RBC-ENTMCNC: 30.2 GM/DL — LOW (ref 32–36)
MCHC RBC-ENTMCNC: 30.2 GM/DL — LOW (ref 32–36)
MCV RBC AUTO: 82.7 FL — SIGNIFICANT CHANGE UP (ref 80–100)
MCV RBC AUTO: 82.7 FL — SIGNIFICANT CHANGE UP (ref 80–100)
MCV RBC AUTO: 82.9 FL — SIGNIFICANT CHANGE UP (ref 80–100)
MCV RBC AUTO: 82.9 FL — SIGNIFICANT CHANGE UP (ref 80–100)
NRBC # BLD: 0 /100 WBCS — SIGNIFICANT CHANGE UP (ref 0–0)
PLATELET # BLD AUTO: 266 K/UL — SIGNIFICANT CHANGE UP (ref 150–400)
PLATELET # BLD AUTO: 266 K/UL — SIGNIFICANT CHANGE UP (ref 150–400)
PLATELET # BLD AUTO: 279 K/UL — SIGNIFICANT CHANGE UP (ref 150–400)
PLATELET # BLD AUTO: 279 K/UL — SIGNIFICANT CHANGE UP (ref 150–400)
POTASSIUM SERPL-MCNC: 4.7 MMOL/L — SIGNIFICANT CHANGE UP (ref 3.5–5.3)
POTASSIUM SERPL-MCNC: 4.7 MMOL/L — SIGNIFICANT CHANGE UP (ref 3.5–5.3)
POTASSIUM SERPL-SCNC: 4.7 MMOL/L — SIGNIFICANT CHANGE UP (ref 3.5–5.3)
POTASSIUM SERPL-SCNC: 4.7 MMOL/L — SIGNIFICANT CHANGE UP (ref 3.5–5.3)
PROTHROM AB SERPL-ACNC: 25.2 SEC — HIGH (ref 9.5–13)
PROTHROM AB SERPL-ACNC: 25.2 SEC — HIGH (ref 9.5–13)
RBC # BLD: 4.28 M/UL — SIGNIFICANT CHANGE UP (ref 4.2–5.8)
RBC # BLD: 4.28 M/UL — SIGNIFICANT CHANGE UP (ref 4.2–5.8)
RBC # BLD: 4.38 M/UL — SIGNIFICANT CHANGE UP (ref 4.2–5.8)
RBC # BLD: 4.38 M/UL — SIGNIFICANT CHANGE UP (ref 4.2–5.8)
RBC # FLD: 16 % — HIGH (ref 10.3–14.5)
RBC # FLD: 16 % — HIGH (ref 10.3–14.5)
RBC # FLD: 16.2 % — HIGH (ref 10.3–14.5)
RBC # FLD: 16.2 % — HIGH (ref 10.3–14.5)
SODIUM SERPL-SCNC: 137 MMOL/L — SIGNIFICANT CHANGE UP (ref 135–145)
SODIUM SERPL-SCNC: 137 MMOL/L — SIGNIFICANT CHANGE UP (ref 135–145)
UFH PPP CHRO-ACNC: 0.44 IU/ML — SIGNIFICANT CHANGE UP (ref 0.3–0.7)
UFH PPP CHRO-ACNC: 0.44 IU/ML — SIGNIFICANT CHANGE UP (ref 0.3–0.7)
WBC # BLD: 6.39 K/UL — SIGNIFICANT CHANGE UP (ref 3.8–10.5)
WBC # BLD: 6.39 K/UL — SIGNIFICANT CHANGE UP (ref 3.8–10.5)
WBC # BLD: 7.57 K/UL — SIGNIFICANT CHANGE UP (ref 3.8–10.5)
WBC # BLD: 7.57 K/UL — SIGNIFICANT CHANGE UP (ref 3.8–10.5)
WBC # FLD AUTO: 6.39 K/UL — SIGNIFICANT CHANGE UP (ref 3.8–10.5)
WBC # FLD AUTO: 6.39 K/UL — SIGNIFICANT CHANGE UP (ref 3.8–10.5)
WBC # FLD AUTO: 7.57 K/UL — SIGNIFICANT CHANGE UP (ref 3.8–10.5)
WBC # FLD AUTO: 7.57 K/UL — SIGNIFICANT CHANGE UP (ref 3.8–10.5)

## 2023-11-11 PROCEDURE — 99231 SBSQ HOSP IP/OBS SF/LOW 25: CPT

## 2023-11-11 PROCEDURE — 99233 SBSQ HOSP IP/OBS HIGH 50: CPT

## 2023-11-11 RX ORDER — HEPARIN SODIUM 5000 [USP'U]/ML
700 INJECTION INTRAVENOUS; SUBCUTANEOUS
Qty: 25000 | Refills: 0 | Status: DISCONTINUED | OUTPATIENT
Start: 2023-11-11 | End: 2023-11-11

## 2023-11-11 RX ORDER — HEPARIN SODIUM 5000 [USP'U]/ML
600 INJECTION INTRAVENOUS; SUBCUTANEOUS
Qty: 25000 | Refills: 0 | Status: DISCONTINUED | OUTPATIENT
Start: 2023-11-11 | End: 2023-11-12

## 2023-11-11 RX ADMIN — Medication 75 MILLIGRAM(S): at 13:47

## 2023-11-11 RX ADMIN — AMIODARONE HYDROCHLORIDE 400 MILLIGRAM(S): 400 TABLET ORAL at 05:50

## 2023-11-11 RX ADMIN — PANTOPRAZOLE SODIUM 40 MILLIGRAM(S): 20 TABLET, DELAYED RELEASE ORAL at 05:51

## 2023-11-11 RX ADMIN — ISOSORBIDE DINITRATE 30 MILLIGRAM(S): 5 TABLET ORAL at 17:27

## 2023-11-11 RX ADMIN — Medication 6 UNIT(S): at 07:55

## 2023-11-11 RX ADMIN — Medication 25 MILLIGRAM(S): at 05:51

## 2023-11-11 RX ADMIN — SENNA PLUS 2 TABLET(S): 8.6 TABLET ORAL at 21:26

## 2023-11-11 RX ADMIN — INSULIN GLARGINE 15 UNIT(S): 100 INJECTION, SOLUTION SUBCUTANEOUS at 21:36

## 2023-11-11 RX ADMIN — Medication 2: at 17:28

## 2023-11-11 RX ADMIN — Medication 1: at 21:36

## 2023-11-11 RX ADMIN — Medication 75 MILLIGRAM(S): at 05:50

## 2023-11-11 RX ADMIN — HEPARIN SODIUM 7 UNIT(S)/HR: 5000 INJECTION INTRAVENOUS; SUBCUTANEOUS at 09:42

## 2023-11-11 RX ADMIN — Medication 6 UNIT(S): at 11:58

## 2023-11-11 RX ADMIN — Medication 81 MILLIGRAM(S): at 11:59

## 2023-11-11 RX ADMIN — ISOSORBIDE DINITRATE 30 MILLIGRAM(S): 5 TABLET ORAL at 05:50

## 2023-11-11 RX ADMIN — ISOSORBIDE DINITRATE 30 MILLIGRAM(S): 5 TABLET ORAL at 11:58

## 2023-11-11 RX ADMIN — Medication 75 MILLIGRAM(S): at 21:27

## 2023-11-11 RX ADMIN — Medication 1: at 11:58

## 2023-11-11 RX ADMIN — HEPARIN SODIUM 6 UNIT(S)/HR: 5000 INJECTION INTRAVENOUS; SUBCUTANEOUS at 17:47

## 2023-11-11 RX ADMIN — AMIODARONE HYDROCHLORIDE 400 MILLIGRAM(S): 400 TABLET ORAL at 21:26

## 2023-11-11 RX ADMIN — CHLORHEXIDINE GLUCONATE 1 APPLICATION(S): 213 SOLUTION TOPICAL at 06:22

## 2023-11-11 RX ADMIN — TAMSULOSIN HYDROCHLORIDE 0.4 MILLIGRAM(S): 0.4 CAPSULE ORAL at 21:28

## 2023-11-11 RX ADMIN — Medication 6 UNIT(S): at 17:28

## 2023-11-11 RX ADMIN — Medication 1: at 07:56

## 2023-11-11 RX ADMIN — AMIODARONE HYDROCHLORIDE 400 MILLIGRAM(S): 400 TABLET ORAL at 13:46

## 2023-11-11 RX ADMIN — DAPAGLIFLOZIN 10 MILLIGRAM(S): 10 TABLET, FILM COATED ORAL at 05:51

## 2023-11-11 RX ADMIN — ATORVASTATIN CALCIUM 80 MILLIGRAM(S): 80 TABLET, FILM COATED ORAL at 21:26

## 2023-11-11 NOTE — PROGRESS NOTE ADULT - SUBJECTIVE AND OBJECTIVE BOX
Neurology        S: patient seen and examined.  no neuro changes.         Medications: MEDICATIONS  (STANDING):  aMIOdarone    Tablet   Oral   aMIOdarone    Tablet 400 milliGRAM(s) Oral every 8 hours  aspirin enteric coated 81 milliGRAM(s) Oral daily  atorvastatin 80 milliGRAM(s) Oral at bedtime  chlorhexidine 2% Cloths 1 Application(s) Topical <User Schedule>  dapagliflozin 10 milliGRAM(s) Oral every 24 hours  heparin  Infusion 1100 Unit(s)/Hr (8 mL/Hr) IV Continuous <Continuous>  hydrALAZINE 75 milliGRAM(s) Oral every 8 hours  insulin glargine Injectable (LANTUS) 15 Unit(s) SubCutaneous at bedtime  insulin lispro (ADMELOG) corrective regimen sliding scale   SubCutaneous three times a day before meals  insulin lispro (ADMELOG) corrective regimen sliding scale   SubCutaneous at bedtime  insulin lispro Injectable (ADMELOG) 6 Unit(s) SubCutaneous three times a day before meals  isosorbide   dinitrate Tablet (ISORDIL) 30 milliGRAM(s) Oral three times a day  metoprolol succinate ER 25 milliGRAM(s) Oral daily  pantoprazole    Tablet 40 milliGRAM(s) Oral before breakfast  senna 2 Tablet(s) Oral at bedtime  tamsulosin 0.4 milliGRAM(s) Oral at bedtime    MEDICATIONS  (PRN):  polyethylene glycol 3350 17 Gram(s) Oral daily PRN Constipation       Vitals:  Vital Signs Last 24 Hrs  T(C): 36.7 (11 Nov 2023 04:17), Max: 36.8 (10 Nov 2023 11:19)  T(F): 98.1 (11 Nov 2023 04:17), Max: 98.2 (10 Nov 2023 11:19)  HR: 87 (11 Nov 2023 04:17) (87 - 109)  BP: 100/64 (11 Nov 2023 04:17) (98/64 - 105/69)  BP(mean): --  RR: 18 (11 Nov 2023 04:17) (18 - 22)  SpO2: 97% (11 Nov 2023 04:17) (95% - 97%)    Parameters below as of 11 Nov 2023 04:17  Patient On (Oxygen Delivery Method): room air            General Exam:   General Appearance: Appropriately dressed and in no acute distress       Head: Normocephalic, atraumatic and no dysmorphic features  Ear, Nose, and Throat: Moist mucous membranes  CVS: S1S2+  Resp: No SOB, no wheeze or rhonchi  GI: soft NT/ND  Extremities: No edema or cyanosis  Skin: No bruises or rashes     Neurological Exam:  Mental Status: Awake, alert and oriented x 2.  Able to follow simple verbal commands. Able to name and repeat. fluent speech. No obvious aphasia or dysarthria noted. poor insight. not understanding why he is in hospital   Cranial Nerves: PERRL, EOMI, VFFC, sensation V1-V3 intact,  no obvious facial asymmetry, equal elevation of palate, scm/trap 5/5, tongue is midline on protrusion. no obvious papilledema on fundoscopic exam. hearing is grossly intact.   Motor: Normal bulk, tone and strength throughout. Fine finger movements were intact and symmetric. no tremors or drift noted.    Sensation: Intact to light touch and pinprick throughout. no right/left confusion. no extinction to tactile on DSS. Romberg was negative.   Reflexes: 1+ throughout at biceps, brachioradialis, triceps, patellars and ankles bilaterally and equal. No clonus. R toe and L toe were both downgoing.  Coordination: No dysmetria on FNF or HKS  Gait:   no limitations in gait.     Data/Labs/Imaging which I personally reviewed.           LABS:                          10.8   6.39  )-----------( 266      ( 11 Nov 2023 06:38 )             36.3     11-11    137  |  101  |  49<H>  ----------------------------<  182<H>  4.7   |  23  |  2.63<H>    Ca    9.0      11 Nov 2023 06:37  Phos  4.3     11-10  Mg     2.2     11-10    TPro  6.3  /  Alb  3.3  /  TBili  0.4  /  DBili  x   /  AST  18  /  ALT  34  /  AlkPhos  86  11-10    LIVER FUNCTIONS - ( 10 Nov 2023 06:32 )  Alb: 3.3 g/dL / Pro: 6.3 g/dL / ALK PHOS: 86 U/L / ALT: 34 U/L / AST: 18 U/L / GGT: x           PT/INR - ( 11 Nov 2023 06:39 )   PT: 25.2 sec;   INR: 2.35 ratio         PTT - ( 11 Nov 2023 06:39 )  PTT:98.8 sec  Urinalysis Basic - ( 11 Nov 2023 06:37 )    Color: x / Appearance: x / SG: x / pH: x  Gluc: 182 mg/dL / Ketone: x  / Bili: x / Urobili: x   Blood: x / Protein: x / Nitrite: x   Leuk Esterase: x / RBC: x / WBC x   Sq Epi: x / Non Sq Epi: x / Bacteria: x        x           < from: CT Head No Cont (10.27.23 @ 18:04) >    ACC: [id number] EXAM:  CT BRAIN   ORDERED BY: BRETT HOPSON     PROCEDURE DATE:  10/27/2023          INTERPRETATION:  Clinical Indication: CVA    5mm axial sections of the brain were obtained from base to vertex,   without the intravenous administration of contrast material. Coronal and   sagittal computer generated reconstructed views are available.    No prior brain imaging is available for comparison.          The ventricles and sulci are prominent consistent with mild atrophy.   Small vesselwhite matter ischemic changes are noted. There is a infarct   in the right medial cerebellum of undetermined age which could be acute   to subacute based on its degree of lucency. There is no significant   hemorrhage. Bone window examination is unremarkable. There is been   previous right-sided lens replacement surgery.      IMPRESSION: Atrophy and small vessel white matter ischemic changes. Right   medial cerebellar infarct may be acute versus subacute. No hemorrhage.      --- End of Report ---    < from: MR Angio Head No Cont (10.30.23 @ 20:58) >    ACC: [id number] EXAM:  MR ANGIO BRAIN   ORDERED BY: NATALIE CARRANZA     ACC: [id number] EXAM:  MR ANGIO NECK   ORDERED BY: NATALIE CARRANZA     ACC: [id number] EXAM:  MR BRAIN   ORDERED BY: NAVNEET CORONADO     PROCEDURE DATE:  10/30/2023          INTERPRETATION:  .    CLINICAL INFORMATION: Stroke.    TECHNIQUE: Multiplanar multi sequential MRI examination of the brain was   performed without the administration of IV gadolinium. MRA images through   the neck and [place] were obtained usinga combination of 2-D   and 3-D time-of-flight acquisition. The data was then reformatted into a   volumetric data set and reviewed as rotational MIP images.    COMPARISON: Most recent prior CT examination of the head from 10/27/2023.   No prior MRI studies are available for comparison.    FINDINGS:    MRI Brain: A wedge-shaped area of diffusion restriction is seen within   the right PICA territory. Associated T2/FLAIR hyperintense signal is also   seen, compatible with cytotoxic edema. Mild hemorrhagic transformation is   seen within the infarct bed manifested by high signal on T1-weighted   imaging as well as susceptibility artifact on the SWI sequence. Mild mass   effect is seen without effacement of the fourth ventricle or shift of the   posterior fossa midline structures.    This is superimposed upon encephalomalacia and gliosis involving the   right cerebellar hemisphere.    An additional vague area of diffusion reduction is seen within the left   parietal periventricular white matter without associated T2/FLAIR   hyperintense signal, compatible with cytotoxic edema. No gross   hemorrhagic transformation is noted in this location.    Scattered foci of susceptibility artifact are seen within the bilateral   basal ganglia, compatible with areas of chronic microhemorrhage.    Multiple additional patchy confluent nonspecific T2/FLAIR hyperintense   signal changes are noted throughout the bihemispheric white matter   without associated mass effect or restricted diffusion.    Ventricular size and configuration is unremarkable. No abnormal   extra-axial fluid collections are seen. Flow-voids are noted throughout   the major intracranial vessels, on the T2 weighted images, consistent   with their patency. The sellar location appears unremarkable. There is an   unchanged appearing small retrocerebellar arachnoid cyst versus cisterna   magna.    A polyp versus retention cyst is seen within the right maxillary sinus.   Additional minor scattered mucosal thickening seen throughout the ethmoid   labyrinth. The tympanomastoid cavities are clear. The left orbit appears   within normal limits. There is evidence of right-sided cataract removal.    There is an indeterminate mixed signal ovoid shaped soft tissue focus   involving the left superior pinna measuring 1.4 x 0.9 cm which appears   unchanged.    MRA Neck: Examination is motion limited.    There is a standard anatomic variation to the aortic arch. The origins of   the great vessels appear grossly unremarkable.    The bilateral common carotid arteries, carotid bifurcations and cervical   internal carotid arteries appear patent. The origin of the left vertebral   artery is normal. The left vertebral artery is patent.    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    MRA [place]: Atherosclerosis affects the bilateral carotid   siphons with mild area of focal stenosis involving the right   clinoid/supraclinoid junction. There is mild hypoplasia of the right A1   segment. Otherwise, the bilateral intracranial internal carotid,   anterior, and middle cerebral arteries appear unremarkable.    The anterior and bilateral posterior communicating arteries are seen.    The right V4 segment does not demonstrate flow relatedsignal. The left   V4 segment appears unremarkable. The vertebrobasilar confluence appears   unremarkable. Long segment mild stenosis involving the mid basilar   artery. The basilar tip appears unremarkable as well as the bilateral   posterior cerebral arteries.    IMPRESSION:    MRI BRAIN:  1. Evolving acute/subacute right-sided PICA distribution infarction with   associated cytotoxic edema and very mild hemorrhagic transformation.  2. Additional wedge-shaped area of acute/subacute ischemia within the   left parietal periatrial white matter with associated cytotoxic edema.  3. Indeterminate soft tissue lesion involving the left superior pinna   measuring 1.4 x 0.9 cm. Neoplasm cannot be excluded. Recommend   correlation with direct physical examination and visualization.    MRA NECK:  1. Extremely motion limited study.  2. 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 Recommend further evaluation with a CT angiogram study   of the neck.    MRA HEAD:  1. 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 This can be further evaluated with a CT angiogram study of the   head.  2. Mild focal stenosis of the right supraclinoid intracranial internal   carotid artery secondary to atherosclerosis.  3. Otherwise, no large vessel occlusion or major stenosis.    --- End of Report ---      < from: CT Brain Stroke Protocol (11.06.23 @ 14:11) >    ACC: [id number] EXAM:  CT BRAIN STROKE PROTOCOL   ORDERED BY:  KAMILA TAVAREZ     ACC: [id number] EXAM:  CT ANGIO NECK STROKE PROTCL IC   ORDERED BY:  KAMILA TAVAREZ     ACC: [id number] EXAM:  CT ANGIO BRAIN STROKE PROTC IC   ORDERED BY:  KAMILA TAVAREZ     PROCEDURE DATE:  11/06/2023          INTERPRETATION:  Clinical Indication: Code stroke for dizziness, prior   infarct one week ago    5mm axial sections of the brain were obtained from base to vertex,   without the intravenous administration of contrast material. Coronal and   sagittal computer generated reconstructed views are available.    Comparison is made with the prior CT of 10/27/2023 and the MRI 10/30/2023.    There is moderate atrophy for the patient's age with ventricular and   sulcal prominence. Small vessel white matter ischemic changes are noted.   There is an old right medial occipital infarct. No acute hemorrhage is   identified.        After the intravenous power injection of 70 cc of Omnipaque 300 using a   bolus amol timing run serial thin sections were obtained through the   neck from the thoracic inlet through the intracranial circulation   centered at the [id number] on a multislice CT scanner reformatted   with coronal and sagittal 2 D-MIP projections, including 3 D  reconstructions using a separate 3D [employer] software workstation.A total   of  70 cc of Omnipaque were intravenously injected.  30 cc were discarded.    The origins of the common carotid arteries are normal. The origin of the   left dominant left vertebral artery demonstrates moderate stenosis   nondominant small right vertebral artery appears hypoplastic on a   congenital basis and is occluded at the V3 and V4 portion.    There is severe stenosis of the V4 segment of the left vertebral artery   just proximal to the VV junction. The basilar artery is normal. The   posterior cerebral and superior cerebellar arteries are normal.    Evaluation of the carotid arteries demonstrate normal appearance to   distal cervical, petrous, cavernous and supraclinoid internal carotid   arteries. The anterior cerebral arteries and anterior communicating   artery are visualized, the right A1 segment is hypoplastic on a   congenital basis. The middle cerebral arteries are visualized, the left   M1 segment is moderately narrowed but patent. The middle cerebral artery   vessels in the sylvian fissure unremarkable.      The normal intracranial venous circulation is identified. The right   transverse sinus is dominant. The superior sagittal sinus, internal   cerebral veins, vein of Jeremías, straight sinus, transverse sinuses,   sigmoid sinuses and internal jugular veins are normal.  Cortical veins are normal.      There are large bilateral pleural effusions and consolidation in the left   upper lobe whichis similar to a prior exam of 11/5/2023. There is a   left-sided permanent pacemaker.    IMPRESSION: Atrophy for the patient's age. Old right cerebellar infarct.   No hemorrhage. No change since 10/30/2023.    Severe stenosis of the dominant left vertebral artery at the V4 segment   and moderate stenosis at the origin. 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 Moderate left M1 stenosis.    --- End of Report ---            ANDREW TONEY MD; Attending Radiologist  This document has been electronically signed. Nov 6 2023  2:35PM    < end of copied text >        LAKESHA DIAZ MD; Attending Radiologist  This document has been electronically signed. Oct 30 2023  9:20PM    < end of copied text >

## 2023-11-11 NOTE — PROGRESS NOTE ADULT - PROBLEM SELECTOR PLAN 1
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 Status post LHC-> 95% discrete proximal LAD disease and sequential multifocal disease with good distal target, 80-90% proximal LCx disease, severe multifocal RCA. Limited viability on MRI. Echocardiogram with LVEF <20%, rWMA & LV thrombus  - HF team & EP card f/u plans appreciated, continue with GDMT (Metoprolol ER 25mg/d, ARB/ARNI- none due to LAURA, No MRA, eventual SGLT2i)  - Bumex 2mg IV bid and Hydral 75mg tid and ISDN 30mg TID  - Inotropes: Off milrinone since 11/1  - Advanced therapies- holding off on launching VAD/transplant eval given cognitive issues   - No longer a PCI candidate due to contraindications for ECMO, which likely would be required as per EP note. Will reassess following completion of amiodarone load.    - daily weight and I/O

## 2023-11-11 NOTE — PROGRESS NOTE ADULT - ASSESSMENT
59 y/o M PMH CVA with mild left sided deficits, HTN, DM2, vertigo, HLD, Mobitz II s/p Medtronic dual chamber primary prevention ICD (12/21/22) initially presented to OSH for SOB c/f ADHF vs. PNA, later found to have NSTEMI transferred to [facility] for LHC evaluation. 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 Readmitted to CICU 11/8 for sustained MMVT on the floor, ~150bpm falling into monitor zone w/o therapy delivered and spontaneously terminated. He was started on Amiodarone load on 11/8. On 11/8, he continued to have frequent MM nonsustained VT, and also sustained MMVT @ rate ~150bpm w/ spontaneous termination.    1. HFrEF   2. CAD  3. VT    - SR 80-90s, no VT  - Continue Amiodarone load 400 mg Q8H to 10gm (received 3.2 gm as of this AM) followed by 200 mg daily - While on Amio, needs monitoring of LFT's and TFT's. Will need monitoring of PFT's and yearly opthalmologic eval as outpatient as well   - HF following, currently holding off on launching VAD/transplant eval given cognitive issues  - HF spoke to IC team, pt not a good candidate for palliative PCI as significant LGE and pt would need ECMO for support which is very high risk in pt with recent strokes / LV thrombus / severe cerebrovascular disease- - Will re-eval after Amiodarone load and decision re: revascularization/AT  - Close telemetry monitoring  - Maintain K>4.0 and Mg>2.0

## 2023-11-11 NOTE — PROGRESS NOTE ADULT - PROBLEM SELECTOR PLAN 6
MRI brain with evolving acute/subacute R PICA infarct with mild edema and mild hemorrhagic transformation. also with acute subacute left parietal infarct also embolic. 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  occluded R VA. severe L VA stenosis   - appreciated Neurology plans  - Repeat CTH stable   - c/w ASA, statin, AC for LV thrombus

## 2023-11-11 NOTE — PROGRESS NOTE ADULT - SUBJECTIVE AND OBJECTIVE BOX
24H hour events: No acute events    MEDICATIONS:  aMIOdarone    Tablet   Oral   aMIOdarone    Tablet 400 milliGRAM(s) Oral every 8 hours  aspirin enteric coated 81 milliGRAM(s) Oral daily  heparin  Infusion 700 Unit(s)/Hr IV Continuous <Continuous>  hydrALAZINE 75 milliGRAM(s) Oral every 8 hours  isosorbide   dinitrate Tablet (ISORDIL) 30 milliGRAM(s) Oral three times a day  metoprolol succinate ER 25 milliGRAM(s) Oral daily  pantoprazole    Tablet 40 milliGRAM(s) Oral before breakfast  polyethylene glycol 3350 17 Gram(s) Oral daily PRN  senna 2 Tablet(s) Oral at bedtime  atorvastatin 80 milliGRAM(s) Oral at bedtime  dapagliflozin 10 milliGRAM(s) Oral every 24 hours  insulin glargine Injectable (LANTUS) 15 Unit(s) SubCutaneous at bedtime  insulin lispro (ADMELOG) corrective regimen sliding scale   SubCutaneous three times a day before meals  insulin lispro (ADMELOG) corrective regimen sliding scale   SubCutaneous at bedtime  insulin lispro Injectable (ADMELOG) 6 Unit(s) SubCutaneous three times a day before meals  chlorhexidine 2% Cloths 1 Application(s) Topical <User Schedule>  tamsulosin 0.4 milliGRAM(s) Oral at bedtime      REVIEW OF SYSTEMS:  Complete 12 point ROS negative.    PHYSICAL EXAM:  T(C): 36.7 (11-11-23 @ 04:17), Max: 36.8 (11-10-23 @ 11:19)  HR: 87 (11-11-23 @ 04:17) (87 - 109)  BP: 100/64 (11-11-23 @ 04:17) (98/64 - 105/69)  RR: 18 (11-11-23 @ 04:17) (18 - 22)  SpO2: 97% (11-11-23 @ 04:17) (95% - 97%)  Wt(kg): --  I&O's Summary    10 Nov 2023 07:01  -  11 Nov 2023 07:00  --------------------------------------------------------  IN: 636 mL / OUT: 0 mL / NET: 636 mL        Appearance: Normal	  HEENT:  PERRL, EOMI	  Cardiovascular: Normal S1 S2, No JVD, No murmurs, No edema  Respiratory: Lungs clear to auscultation	  Psychiatry: Mood & affect appropriate  Gastrointestinal:  Soft, Non-tender, + BS	  Skin: No rashes, No ecchymoses, No cyanosis	  Neurologic: Non-focal  Extremities: No clubbing, cyanosis or edema  Vascular: Peripheral pulses palpable 2+ bilaterally        LABS:	 	    CBC Full  -  ( 11 Nov 2023 06:38 )  WBC Count : 6.39 K/uL  Hemoglobin : 10.8 g/dL  Hematocrit : 36.3 %  Platelet Count - Automated : 266 K/uL  Mean Cell Volume : 82.9 fl  Mean Cell Hemoglobin : 24.7 pg  Mean Cell Hemoglobin Concentration : 29.8 gm/dL  Auto Neutrophil # : x  Auto Lymphocyte # : x  Auto Monocyte # : x  Auto Eosinophil # : x  Auto Basophil # : x  Auto Neutrophil % : x  Auto Lymphocyte % : x  Auto Monocyte % : x  Auto Eosinophil % : x  Auto Basophil % : x    11-11    137  |  101  |  49<H>  ----------------------------<  182<H>  4.7   |  23  |  2.63<H>  11-10    138  |  102  |  46<H>  ----------------------------<  191<H>  4.7   |  22  |  2.38<H>    Ca    9.0      11 Nov 2023 06:37  Ca    9.1      10 Nov 2023 06:32  Phos  4.3     11-10  Mg     2.2     11-10    TPro  6.3  /  Alb  3.3  /  TBili  0.4  /  DBili  x   /  AST  18  /  ALT  34  /  AlkPhos  86  11-10      TELEMETRY: SR 80-90s, no VT 	      < from: TTE W or WO Ultrasound Enhancing Agent (11.08.23 @ 07:07) >  TRANSTHORACIC ECHOCARDIOGRAM REPORT  ________________________________________________________________________________                                      _______       Pt. Name:       BRANDEN MARRUFO   Study Date:    11/8/2023  MRN:            [id number]   YOB: 1965  Accession #:    [id number]    Age:           58 years  Account#:       [id number] Gender:        M  Heart Rate:                  Height:        72.00 in (182.88 cm)  Rhythm:                      Weight:        154.00 lb (69.85 kg)  Blood Pressure: 122/80 mmHg  BSA/BMI:       1.91 m² / 20.89 kg/m²  ________________________________________________________________________________________  Referring Physician:    [id number] Kojo Han  Interpreting Physician: Puneet Beltrán M.D.  Primary Sonographer:    Jany Goel RDCS    CPT:                ECHO TTE W CON FU LTD - .m;LIMITED SPECTRAL -                      07352.m;DEFINITY ECHO CONTRAST PER ML WASTED -                      .m;DEFINITY ECHO CONTRASTPER ML - .m  Indication(s):      Heart failure, unspecified - I50.9  Procedure:          Limited transthoracic echocardiogram.  Ordering Location:  CICU  Contrast Injection: Verbal consent was obtained for injection of Ultrasonic       Enhancing Agent following a discussion of risks and                      benefits.                      Endocardial visualization enhanced with 2 ml of Definity                      Ultrasound enhancing agent (Lot#:[id number] Discarded Dose:8ml).  UEAReaction:       Patient had no adverse reaction after injection of                      Ultrasound Enhancing Agent.  Study Information:  Image quality for this study is adequate.    _______________________________________________________________________________________     CONCLUSIONS:      1. Left ventricular systolic function is severely decreased with an ejection fraction visually estimated at <20 %. Global left ventricular hypokinesis.   2. Compared to the transthoracic echocardiogram performed on 10/26/2023 the thrombus is much smaller.    ________________________________________________________________________________________  FINDINGS:     Left Ventricle:  Left ventricular systolic function is severely decreased with an ejection fraction visually estimated at <20%. There is global left ventricular hypokinesis. There is a small left ventricular thrombus.  ____________________________________________________________________  Quantitative Data:  Left Ventricle Measurements: (Indexed to BSA)     Visualized LV EF%: <20%       LVOT / RVOT/ Qp/Qs Data: (Indexed to BSA)  LVOT Diameter: 2.30 cm  LVOT Vmax:     0.58 m/s  LVOT VTI:      8.07 cm  LVOT SV:       33.5 ml  17.59 ml/m²    < end of copied text >

## 2023-11-11 NOTE — PROGRESS NOTE ADULT - PROBLEM SELECTOR PLAN 3
s/p LHC-> 95% discrete proximal LAD disease and sequential multifocal disease with good distal target, 80-90% proximal LCx disease just prior to marginal, severe multifocal RCA. Limited viability on MRI  - EF <20%  - Not a candidate for CABG, likely not a candidate for PCI as per EP note  - Plans- c/w ASA, statin, Metoprolol, IV heparin gtt

## 2023-11-11 NOTE — PROGRESS NOTE ADULT - SUBJECTIVE AND OBJECTIVE BOX
Patient seen and examined at bedside.    --Anticoagulation--  heparin  Infusion 700 Unit(s)/Hr IV Continuous <Continuous>    T(C): 36.7 (11-11-23 @ 04:17), Max: 36.8 (11-10-23 @ 11:19)  HR: 87 (11-11-23 @ 04:17) (87 - 109)  BP: 100/64 (11-11-23 @ 04:17) (98/64 - 105/69)  RR: 18 (11-11-23 @ 04:17) (18 - 22)  SpO2: 97% (11-11-23 @ 04:17) (95% - 97%)  Wt(kg): --    Exam: very mild left facial o/w intact, AOx3 no drift GALLEGOS 5/5MARY KATE

## 2023-11-11 NOTE — PROGRESS NOTE ADULT - NS ATTEND AMEND GEN_ALL_CORE FT
59 y/o M PMH CVA with mild left sided deficits, HTN, DM2, vertigo, HLD, Mobitz II s/p Medtronic dual chamber primary prevention ICD (12/21/22) initially presented to OSH for SOB c/f ADHF vs. PNA, later found to have NSTEMI transferred to [facility] for LHC evaluation. 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 Readmitted to CICU 11/8 for sustained MMVT on the floor, ~150bpm falling into monitor zone w/o therapy delivered and spontaneously terminated. He was started on Amiodarone load on 11/8. On 11/8, he continued to have frequent MM nonsustained VT, and also sustained MMVT @ rate ~150bpm w/ spontaneous termination. Continue Amiodarone load 400 mg Q8H to 10gm (received 3.2 gm as of this AM) followed by 200 mg daily - While on Amio, needs monitoring of LFT's and TFT's. Will need monitoring of PFT's and yearly ophthalmologic eval as outpatient as well   HF following, currently holding off on launching VAD/transplant eval given cognitive issues. HF spoke to IC team, pt not a good candidate for palliative PCI as significant LGE and pt would need ECMO for support which is very high risk in pt with recent strokes / LV thrombus / severe cerebrovascular disease- - Will re-eval after Amiodarone load and decision re: revascularization/AT.

## 2023-11-11 NOTE — PROGRESS NOTE ADULT - ASSESSMENT
59 yo Male with prior Stroke with mild ?left sided deficits (improved), HTN, DM2, vertigo, HLD, Mobitz II s/pp Medtronic dual chamber ICD (12/21/22) initially presented to [facility] from home with complaints of dyspnea and chest pain and was admitted w/ acute hypoxic respiratory failure likely due to acute pulmonary edema due to acute HFrEF in setting of acute NSTEMI, LAURA and enterovirus infection. Pt with brief MICU stay at [facility] requiring bipap (no intubation), was treated for PNA with cefepime, and was found to have TTE done 9/26/23 showing EF 20% with severely decreased LVSF, multiple regional wall motion abnormalities, and elevated LV end diastolic pressure. Pt was transferred to [facility] for cardiac eval.   TTE EF < 20%  RHC 10/19: RA 10, RV 47/9/13, Wedge 29, PA 41/26/33, CO/CI 4.4/2.3, PA sat 57.3  EKG: sinus tachycardia, septal q-waves, left axis deviation   TTE 10/16/23: LV 5.5 cm, LVEF 20% with regional WMAs worse in the apex, LVOT VTI 8 cm, normal RV size/function, severe functional MR, small pericardial effusion, estimated RA pressure 8 mmHg   TTE 12/2022: normal LVEF   LHC: 95% discrete proximal LAD disease and sequential multifocal disease with good distal target, 80-90% proximal LCx disease just prior to marginals, severe multifocal RCA disease with good targets   A1c 8.4    CD 10/17 neg   NIHSS 1  CTH with age indeterminate R cerebellar infarct.  likely chronic   CTA with mod L MCA stenosis, R VA occlusion, severe L VA stenosis   MRi brain with eovlving acute/subacute R PICA infarct with mild edema and mild hemorrhagic transformation. 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  occluded R VA. severe L VA stenosis   CTH stable   RRT/code stroke on 11/6 in setting of SBP 80s/50s   CTH stable. old R cerebellar infarct. CTA with severe L VA stenosis ; R VA occlusion   11/8 RRT transferred to CCU. no neuro changes     no neuro changes     Impression:   R cerebellar/PICA infarct as well as L parietal embolic infarct, likely cardioembolic   worsening symptoms in setting of hypotension,m now improved       - reached out to neuro IR for any role of stenting his L VA given his R VA occlusion and severe L VA stenosis.  may change decision on cardiac management if we can revascularize.   reached out to Neuro IR fellow, awaiting call back after review. --. if there is plan for intervention he would benefit from MR NOVA (will order): spoke with neurosx who will also f/u   - -  patient will be high risk from neuro standpoint for cardiac intervention however benefits at this point seem to outweigh risks.  would consider PCI staging next week if repeat CTH stable.  triple therapy will be needed    - keep SBP > 100; becomes symptomatic when BP drops   - c/o SOB ; f/u cardio and pulmo   - f/u heart failure team   - can consider routine EEG but low yield   - CTS eval for CABG given 3 vessel disease vs high risk PCI --> not a CABG candidate ; possible PCI planning but would need "triple therapy"   - c/w asa and statin therpay for secondary stroke prevention.   - no objection to heparin drip for low EF and LV thrombus  --> no objection to transition to coumadin ; hep to coumadin bridge ; coumadin per INR   - called for risk stratification for heart transplant eval,  low-mod risk from stroke standpoint and no objection   - b12, TSH, RPR for reversible causes of dementia   - telemetry  - PT/OT   - check FS, glucose control <180  - GI/DVT ppx   - Thank you for allowing me to participate in the care of this patient. Call with questions.   spoke with primary team     Abelardo Irving MD  Vascular Neurology  Office: [phone]

## 2023-11-11 NOTE — PROGRESS NOTE ADULT - PROBLEM SELECTOR PLAN 2
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 Neurology following and consulted neurosurgery, with plans for angio on Monday-Tuesday.   Medically, patient is high risk for low risk procedure, however unclear if further optimization can be performed due to severe chronic cardiac disease.  - Cardiology following, patient will require cardiac evaluation and risk stratification prior to OR

## 2023-11-11 NOTE — PROGRESS NOTE ADULT - ASSESSMENT
58M on ASA81/Hep gtt. Hx DM2, Vertigo, Mobitz II heart block s/p Medtronic dual chamber ICD (2022, has "MRI" mode), admitted since 10/13 for acute hypoxic respiratory failure 2/2 HFrEF (EF 20%) iso NSTEMI, LAURA, and enterovirus. Known prior CVA w/ mild Lt sided deficits from previous admission at [facility]. Primary/neurology reaching out for consideration of stent in setting of Vertebrobasilar insufficiency. CTH/CTA&N 11/6 showed stable chronic Rt cerebellar infarct. Severe stenosis of dominant Lt VA at V4, occlusion of nondominant Rt VA at V3/V4. Moderate Lt M1 stenosis. No heme. Exam: very mild left facial o/w intact, AOx3 no drift EL 5/5, SILT    -pre-op for angio Monday/Tuesday  -please document medical clearance

## 2023-11-11 NOTE — PROGRESS NOTE ADULT - SUBJECTIVE AND OBJECTIVE BOX
[facility] Division of Hospital Medicine  Bernard Montelongo MD  Available via MS Teams    SUBJECTIVE / OVERNIGHT EVENTS: Patient seen and examined at bedside, resting comfortably and in no acute distress. When asked, patient reports no complaints at time of exam. Denies chest pain or palpitations. Denies shortness of breath or cough. ROS otherwise noncontributory.     MEDICATIONS  (STANDING):  aMIOdarone    Tablet   Oral   aMIOdarone    Tablet 400 milliGRAM(s) Oral every 8 hours  aspirin enteric coated 81 milliGRAM(s) Oral daily  atorvastatin 80 milliGRAM(s) Oral at bedtime  chlorhexidine 2% Cloths 1 Application(s) Topical <User Schedule>  dapagliflozin 10 milliGRAM(s) Oral every 24 hours  heparin  Infusion 700 Unit(s)/Hr (7 mL/Hr) IV Continuous <Continuous>  hydrALAZINE 75 milliGRAM(s) Oral every 8 hours  insulin glargine Injectable (LANTUS) 15 Unit(s) SubCutaneous at bedtime  insulin lispro (ADMELOG) corrective regimen sliding scale   SubCutaneous three times a day before meals  insulin lispro (ADMELOG) corrective regimen sliding scale   SubCutaneous at bedtime  insulin lispro Injectable (ADMELOG) 6 Unit(s) SubCutaneous three times a day before meals  isosorbide   dinitrate Tablet (ISORDIL) 30 milliGRAM(s) Oral three times a day  metoprolol succinate ER 25 milliGRAM(s) Oral daily  pantoprazole    Tablet 40 milliGRAM(s) Oral before breakfast  senna 2 Tablet(s) Oral at bedtime  tamsulosin 0.4 milliGRAM(s) Oral at bedtime    MEDICATIONS  (PRN):  polyethylene glycol 3350 17 Gram(s) Oral daily PRN Constipation      I&O's Summary    10 Nov 2023 07:01  -  11 Nov 2023 07:00  --------------------------------------------------------  IN: 636 mL / OUT: 0 mL / NET: 636 mL    11 Nov 2023 07:01  -  11 Nov 2023 12:09  --------------------------------------------------------  IN: 30 mL / OUT: 150 mL / NET: -120 mL        PHYSICAL EXAM:  Vital Signs Last 24 Hrs  T(C): 36.5 (11 Nov 2023 11:30), Max: 36.7 (11 Nov 2023 04:17)  T(F): 97.7 (11 Nov 2023 11:30), Max: 98.1 (11 Nov 2023 04:17)  HR: 86 (11 Nov 2023 11:30) (86 - 109)  BP: 106/67 (11 Nov 2023 11:30) (100/64 - 106/67)  RR: 18 (11 Nov 2023 11:30) (18 - 18)  SpO2: 97% (11 Nov 2023 11:30) (97% - 97%)    Parameters below as of 11 Nov 2023 11:30  Patient On (Oxygen Delivery Method): nasal cannula  O2 Flow (L/min): 2    CONSTITUTIONAL: NAD, well-groomed  EYES: PERRLA; conjunctiva and sclera clear  ENMT: Moist oral mucosa, no pharyngeal injection or exudates; normal dentition  NECK: Supple, no palpable masses; no thyromegaly  RESPIRATORY: Normal respiratory effort; lungs are clear to auscultation bilaterally  CARDIOVASCULAR: normal S1 and S2, no murmur/rub/gallop; No lower extremity edema  ABDOMEN: Nontender to palpation, normoactive bowel sounds, no rebound/guarding; No hepatosplenomegaly  MUSCULOSKELETAL:  no clubbing or cyanosis of digits; no joint swelling or tenderness to palpation  PSYCH: A+O to person, place, not to year   NEUROLOGY: CN 2-12 are intact and symmetric; no gross sensory deficits   SKIN: No rashes; no palpable lesions    LABS:                        10.8   6.39  )-----------( 266      ( 11 Nov 2023 06:38 )             36.3     11-11    137  |  101  |  49<H>  ----------------------------<  182<H>  4.7   |  23  |  2.63<H>    Ca    9.0      11 Nov 2023 06:37  Phos  4.3     11-10  Mg     2.2     11-10    TPro  6.3  /  Alb  3.3  /  TBili  0.4  /  DBili  x   /  AST  18  /  ALT  34  /  AlkPhos  86  11-10    PT/INR - ( 11 Nov 2023 06:39 )   PT: 25.2 sec;   INR: 2.35 ratio         PTT - ( 11 Nov 2023 06:39 )  PTT:98.8 sec      Urinalysis Basic - ( 11 Nov 2023 06:37 )    Color: x / Appearance: x / SG: x / pH: x  Gluc: 182 mg/dL / Ketone: x  / Bili: x / Urobili: x   Blood: x / Protein: x / Nitrite: x   Leuk Esterase: x / RBC: x / WBC x   Sq Epi: x / Non Sq Epi: x / Bacteria: x        SARS-CoV-2: NotDetec (06 Nov 2023 19:17)  SARS-CoV-2: NotDetec (03 Nov 2023 18:25)  SARS-CoV-2: NotDetec (24 Oct 2023 07:17)  SARS-CoV-2: NotDetec (20 Oct 2023 09:18)  COVID-19 PCR: NotDetec (14 Oct 2023 11:34)

## 2023-11-11 NOTE — PROGRESS NOTE ADULT - ASSESSMENT
58M h/o prior CVA (2012) with mild L sided weakness, Mobitz II s/p Medtronic dual chamber ICD (12/21/22) DM2, HTN, HLD, admitted in CCU as a transfer from [facility] for NSTEMI on 10/13/23. His hospital course has been complicated by acute systolic dysfunction, LV thrombus, and possible acute stroke. He was found to have triple vessel disease on LHC and a RHC with elevated filling pressures. Was also found to have MMVT and was evaluated by EP card, HF team, Pulmonary, Neurology and Palliative care.   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

## 2023-11-12 LAB
ALBUMIN SERPL ELPH-MCNC: 3.3 G/DL — SIGNIFICANT CHANGE UP (ref 3.3–5)
ALBUMIN SERPL ELPH-MCNC: 3.3 G/DL — SIGNIFICANT CHANGE UP (ref 3.3–5)
ALP SERPL-CCNC: 90 U/L — SIGNIFICANT CHANGE UP (ref 40–120)
ALP SERPL-CCNC: 90 U/L — SIGNIFICANT CHANGE UP (ref 40–120)
ALT FLD-CCNC: 29 U/L — SIGNIFICANT CHANGE UP (ref 10–45)
ALT FLD-CCNC: 29 U/L — SIGNIFICANT CHANGE UP (ref 10–45)
ANION GAP SERPL CALC-SCNC: 12 MMOL/L — SIGNIFICANT CHANGE UP (ref 5–17)
ANION GAP SERPL CALC-SCNC: 12 MMOL/L — SIGNIFICANT CHANGE UP (ref 5–17)
APTT BLD: 62.1 SEC — HIGH (ref 24.5–35.6)
APTT BLD: 62.1 SEC — HIGH (ref 24.5–35.6)
APTT BLD: 63 SEC — HIGH (ref 24.5–35.6)
APTT BLD: 63 SEC — HIGH (ref 24.5–35.6)
AST SERPL-CCNC: 13 U/L — SIGNIFICANT CHANGE UP (ref 10–40)
AST SERPL-CCNC: 13 U/L — SIGNIFICANT CHANGE UP (ref 10–40)
BILIRUB SERPL-MCNC: 0.4 MG/DL — SIGNIFICANT CHANGE UP (ref 0.2–1.2)
BILIRUB SERPL-MCNC: 0.4 MG/DL — SIGNIFICANT CHANGE UP (ref 0.2–1.2)
BUN SERPL-MCNC: 53 MG/DL — HIGH (ref 7–23)
BUN SERPL-MCNC: 53 MG/DL — HIGH (ref 7–23)
CALCIUM SERPL-MCNC: 8.5 MG/DL — SIGNIFICANT CHANGE UP (ref 8.4–10.5)
CALCIUM SERPL-MCNC: 8.5 MG/DL — SIGNIFICANT CHANGE UP (ref 8.4–10.5)
CHLORIDE SERPL-SCNC: 101 MMOL/L — SIGNIFICANT CHANGE UP (ref 96–108)
CHLORIDE SERPL-SCNC: 101 MMOL/L — SIGNIFICANT CHANGE UP (ref 96–108)
CO2 SERPL-SCNC: 23 MMOL/L — SIGNIFICANT CHANGE UP (ref 22–31)
CO2 SERPL-SCNC: 23 MMOL/L — SIGNIFICANT CHANGE UP (ref 22–31)
CREAT SERPL-MCNC: 2.91 MG/DL — HIGH (ref 0.5–1.3)
CREAT SERPL-MCNC: 2.91 MG/DL — HIGH (ref 0.5–1.3)
CULTURE RESULTS: SIGNIFICANT CHANGE UP
EGFR: 25 ML/MIN/1.73M2 — LOW
EGFR: 25 ML/MIN/1.73M2 — LOW
GLUCOSE BLDC GLUCOMTR-MCNC: 156 MG/DL — HIGH (ref 70–99)
GLUCOSE BLDC GLUCOMTR-MCNC: 156 MG/DL — HIGH (ref 70–99)
GLUCOSE BLDC GLUCOMTR-MCNC: 157 MG/DL — HIGH (ref 70–99)
GLUCOSE BLDC GLUCOMTR-MCNC: 157 MG/DL — HIGH (ref 70–99)
GLUCOSE BLDC GLUCOMTR-MCNC: 163 MG/DL — HIGH (ref 70–99)
GLUCOSE BLDC GLUCOMTR-MCNC: 163 MG/DL — HIGH (ref 70–99)
GLUCOSE BLDC GLUCOMTR-MCNC: 187 MG/DL — HIGH (ref 70–99)
GLUCOSE BLDC GLUCOMTR-MCNC: 187 MG/DL — HIGH (ref 70–99)
GLUCOSE BLDC GLUCOMTR-MCNC: 202 MG/DL — HIGH (ref 70–99)
GLUCOSE BLDC GLUCOMTR-MCNC: 202 MG/DL — HIGH (ref 70–99)
GLUCOSE SERPL-MCNC: 164 MG/DL — HIGH (ref 70–99)
GLUCOSE SERPL-MCNC: 164 MG/DL — HIGH (ref 70–99)
POTASSIUM SERPL-MCNC: 4.3 MMOL/L — SIGNIFICANT CHANGE UP (ref 3.5–5.3)
POTASSIUM SERPL-MCNC: 4.3 MMOL/L — SIGNIFICANT CHANGE UP (ref 3.5–5.3)
POTASSIUM SERPL-SCNC: 4.3 MMOL/L — SIGNIFICANT CHANGE UP (ref 3.5–5.3)
POTASSIUM SERPL-SCNC: 4.3 MMOL/L — SIGNIFICANT CHANGE UP (ref 3.5–5.3)
PROT SERPL-MCNC: 5.6 G/DL — LOW (ref 6–8.3)
PROT SERPL-MCNC: 5.6 G/DL — LOW (ref 6–8.3)
SODIUM SERPL-SCNC: 136 MMOL/L — SIGNIFICANT CHANGE UP (ref 135–145)
SODIUM SERPL-SCNC: 136 MMOL/L — SIGNIFICANT CHANGE UP (ref 135–145)
SPECIMEN SOURCE: SIGNIFICANT CHANGE UP

## 2023-11-12 PROCEDURE — 99233 SBSQ HOSP IP/OBS HIGH 50: CPT | Mod: GC

## 2023-11-12 PROCEDURE — 99232 SBSQ HOSP IP/OBS MODERATE 35: CPT

## 2023-11-12 RX ORDER — SODIUM CHLORIDE 9 MG/ML
1000 INJECTION, SOLUTION INTRAVENOUS
Refills: 0 | Status: DISCONTINUED | OUTPATIENT
Start: 2023-11-12 | End: 2023-11-12

## 2023-11-12 RX ORDER — WARFARIN SODIUM 2.5 MG/1
5 TABLET ORAL ONCE
Refills: 0 | Status: COMPLETED | OUTPATIENT
Start: 2023-11-12 | End: 2023-11-12

## 2023-11-12 RX ADMIN — AMIODARONE HYDROCHLORIDE 400 MILLIGRAM(S): 400 TABLET ORAL at 05:43

## 2023-11-12 RX ADMIN — Medication 75 MILLIGRAM(S): at 05:43

## 2023-11-12 RX ADMIN — INSULIN GLARGINE 15 UNIT(S): 100 INJECTION, SOLUTION SUBCUTANEOUS at 21:28

## 2023-11-12 RX ADMIN — ATORVASTATIN CALCIUM 80 MILLIGRAM(S): 80 TABLET, FILM COATED ORAL at 21:26

## 2023-11-12 RX ADMIN — Medication 75 MILLIGRAM(S): at 21:27

## 2023-11-12 RX ADMIN — DAPAGLIFLOZIN 10 MILLIGRAM(S): 10 TABLET, FILM COATED ORAL at 05:43

## 2023-11-12 RX ADMIN — HEPARIN SODIUM 6 UNIT(S)/HR: 5000 INJECTION INTRAVENOUS; SUBCUTANEOUS at 08:57

## 2023-11-12 RX ADMIN — ISOSORBIDE DINITRATE 30 MILLIGRAM(S): 5 TABLET ORAL at 11:32

## 2023-11-12 RX ADMIN — TAMSULOSIN HYDROCHLORIDE 0.4 MILLIGRAM(S): 0.4 CAPSULE ORAL at 21:29

## 2023-11-12 RX ADMIN — Medication 25 MILLIGRAM(S): at 05:43

## 2023-11-12 RX ADMIN — Medication 81 MILLIGRAM(S): at 11:32

## 2023-11-12 RX ADMIN — ISOSORBIDE DINITRATE 30 MILLIGRAM(S): 5 TABLET ORAL at 05:43

## 2023-11-12 RX ADMIN — Medication 6 UNIT(S): at 08:00

## 2023-11-12 RX ADMIN — AMIODARONE HYDROCHLORIDE 400 MILLIGRAM(S): 400 TABLET ORAL at 13:29

## 2023-11-12 RX ADMIN — SENNA PLUS 2 TABLET(S): 8.6 TABLET ORAL at 21:29

## 2023-11-12 RX ADMIN — Medication 75 MILLIGRAM(S): at 13:29

## 2023-11-12 RX ADMIN — Medication 1: at 08:00

## 2023-11-12 RX ADMIN — ISOSORBIDE DINITRATE 30 MILLIGRAM(S): 5 TABLET ORAL at 17:13

## 2023-11-12 RX ADMIN — Medication 1: at 17:14

## 2023-11-12 RX ADMIN — AMIODARONE HYDROCHLORIDE 400 MILLIGRAM(S): 400 TABLET ORAL at 21:27

## 2023-11-12 RX ADMIN — Medication 6 UNIT(S): at 11:32

## 2023-11-12 RX ADMIN — Medication 6 UNIT(S): at 17:14

## 2023-11-12 RX ADMIN — WARFARIN SODIUM 5 MILLIGRAM(S): 2.5 TABLET ORAL at 21:26

## 2023-11-12 RX ADMIN — PANTOPRAZOLE SODIUM 40 MILLIGRAM(S): 20 TABLET, DELAYED RELEASE ORAL at 05:45

## 2023-11-12 RX ADMIN — Medication 1: at 11:33

## 2023-11-12 RX ADMIN — HEPARIN SODIUM 6 UNIT(S)/HR: 5000 INJECTION INTRAVENOUS; SUBCUTANEOUS at 01:07

## 2023-11-12 RX ADMIN — CHLORHEXIDINE GLUCONATE 1 APPLICATION(S): 213 SOLUTION TOPICAL at 05:47

## 2023-11-12 NOTE — PROGRESS NOTE ADULT - SUBJECTIVE AND OBJECTIVE BOX
Andre Reyes, M.D.  Pager: [phone]  Office: [phone]    Patient is a 58y old  Male who presents with a chief complaint of NSTEMI (11 Nov 2023 12:09)          SUBJECTIVE / OVERNIGHT EVENTS:    No acute overnight events.    ROS: ( - ) Fever, ( - )Chills,  ( - )Nausea/Vomiting, ( - ) Cough, ( - )Shortness of breath, ( - )Chest Pain    MEDICATIONS  (STANDING):  aMIOdarone    Tablet   Oral   aMIOdarone    Tablet 400 milliGRAM(s) Oral every 8 hours  aspirin enteric coated 81 milliGRAM(s) Oral daily  atorvastatin 80 milliGRAM(s) Oral at bedtime  chlorhexidine 2% Cloths 1 Application(s) Topical <User Schedule>  dapagliflozin 10 milliGRAM(s) Oral every 24 hours  heparin  Infusion 600 Unit(s)/Hr (6 mL/Hr) IV Continuous <Continuous>  hydrALAZINE 75 milliGRAM(s) Oral every 8 hours  insulin glargine Injectable (LANTUS) 15 Unit(s) SubCutaneous at bedtime  insulin lispro (ADMELOG) corrective regimen sliding scale   SubCutaneous three times a day before meals  insulin lispro (ADMELOG) corrective regimen sliding scale   SubCutaneous at bedtime  insulin lispro Injectable (ADMELOG) 6 Unit(s) SubCutaneous three times a day before meals  isosorbide   dinitrate Tablet (ISORDIL) 30 milliGRAM(s) Oral three times a day  lactated ringers. 1000 milliLiter(s) (50 mL/Hr) IV Continuous <Continuous>  metoprolol succinate ER 25 milliGRAM(s) Oral daily  pantoprazole    Tablet 40 milliGRAM(s) Oral before breakfast  senna 2 Tablet(s) Oral at bedtime  tamsulosin 0.4 milliGRAM(s) Oral at bedtime    MEDICATIONS  (PRN):  polyethylene glycol 3350 17 Gram(s) Oral daily PRN Constipation          T(C): 36.8 (11-12 @ 04:31), Max: 36.9 (11-11 @ 20:53)   HR: 80   BP: 102/65   RR: 18   SpO2: 95%    PHYSICAL EXAM:    CONSTITUTIONAL: NAD, well-developed, well-groomed  EYES: PERRLA; conjunctiva and sclera clear  ENMT: Moist oral mucosa, no pharyngeal injection or exudates; normal dentition  NECK: Supple, no palpable masses; no thyromegaly  RESPIRATORY: Normal respiratory effort; lungs are clear to auscultation bilaterally  CARDIOVASCULAR: Regular rate and rhythm, normal S1 and S2, no murmur/rub/gallop; No lower extremity edema; Peripheral pulses are 2+ bilaterally  ABDOMEN: Nontender to palpation, normoactive bowel sounds, no rebound/guarding; No hepatosplenomegaly  MUSCULOSKELETAL:  no clubbing or cyanosis of digits; no joint swelling or tenderness to palpation  PSYCH: A+O to person, place, and time; affect appropriate  NEUROLOGY: CN 2-12 are intact and symmetric; no gross sensory deficits   SKIN: No rashes; no palpable lesions      LABS:                        10.7   7.57  )-----------( 279      ( 11 Nov 2023 16:07 )             35.4      11-12    136  |  101  |  53<H>  ----------------------------<  164<H>  4.3   |  23  |  2.91<H>    Ca    8.5      12 Nov 2023 06:52    TPro  5.6<L>  /  Alb  3.3  /  TBili  0.4  /  DBili  x   /  AST  13  /  ALT  29  /  AlkPhos  90  11-12       CAPILLARY BLOOD GLUCOSE      POCT Blood Glucose.: 157 mg/dL (12 Nov 2023 07:40)  POCT Blood Glucose.: 163 mg/dL (12 Nov 2023 06:41)  POCT Blood Glucose.: 192 mg/dL (11 Nov 2023 23:54)  POCT Blood Glucose.: 254 mg/dL (11 Nov 2023 21:31)  POCT Blood Glucose.: 233 mg/dL (11 Nov 2023 17:17)  POCT Blood Glucose.: 169 mg/dL (11 Nov 2023 11:45)      RADIOLOGY & ADDITIONAL TESTS:    Imaging Personally Reviewed:  Consultant(s) Notes Reviewed:    Care Discussed with Consultants/Other Providers:   Andre Reyes, M.D.  Pager: [phone]  Office: [phone]    Patient is a 58y old  Male who presents with a chief complaint of NSTEMI (11 Nov 2023 12:09)          SUBJECTIVE / OVERNIGHT EVENTS:    No acute overnight events.  Pt without any complaints    ROS: ( - ) Fever, ( - )Chills,  ( - )Nausea/Vomiting, ( - ) Cough, ( - )Shortness of breath, ( - )Chest Pain    MEDICATIONS  (STANDING):  aMIOdarone    Tablet   Oral   aMIOdarone    Tablet 400 milliGRAM(s) Oral every 8 hours  aspirin enteric coated 81 milliGRAM(s) Oral daily  atorvastatin 80 milliGRAM(s) Oral at bedtime  chlorhexidine 2% Cloths 1 Application(s) Topical <User Schedule>  dapagliflozin 10 milliGRAM(s) Oral every 24 hours  heparin  Infusion 600 Unit(s)/Hr (6 mL/Hr) IV Continuous <Continuous>  hydrALAZINE 75 milliGRAM(s) Oral every 8 hours  insulin glargine Injectable (LANTUS) 15 Unit(s) SubCutaneous at bedtime  insulin lispro (ADMELOG) corrective regimen sliding scale   SubCutaneous three times a day before meals  insulin lispro (ADMELOG) corrective regimen sliding scale   SubCutaneous at bedtime  insulin lispro Injectable (ADMELOG) 6 Unit(s) SubCutaneous three times a day before meals  isosorbide   dinitrate Tablet (ISORDIL) 30 milliGRAM(s) Oral three times a day  lactated ringers. 1000 milliLiter(s) (50 mL/Hr) IV Continuous <Continuous>  metoprolol succinate ER 25 milliGRAM(s) Oral daily  pantoprazole    Tablet 40 milliGRAM(s) Oral before breakfast  senna 2 Tablet(s) Oral at bedtime  tamsulosin 0.4 milliGRAM(s) Oral at bedtime    MEDICATIONS  (PRN):  polyethylene glycol 3350 17 Gram(s) Oral daily PRN Constipation          T(C): 36.8 (11-12 @ 04:31), Max: 36.9 (11-11 @ 20:53)   HR: 80   BP: 102/65   RR: 18   SpO2: 95%    PHYSICAL EXAM:    CONSTITUTIONAL: NAD, well-developed, well-groomed  EYES: PERRLA; conjunctiva and sclera clear  ENMT: Moist oral mucosa, no pharyngeal injection or exudates; normal dentition  NECK: Supple, no palpable masses; no thyromegaly  RESPIRATORY: Normal respiratory effort; lungs are clear to auscultation bilaterally  CARDIOVASCULAR: Regular rate and rhythm, normal S1 and S2, no murmur/rub/gallop; No lower extremity edema; Peripheral pulses are 2+ bilaterally  ABDOMEN: Nontender to palpation, normoactive bowel sounds, no rebound/guarding; No hepatosplenomegaly  MUSCULOSKELETAL:  no clubbing or cyanosis of digits; no joint swelling or tenderness to palpation  PSYCH: A+O to person, place, and time; affect appropriate  NEUROLOGY: CN 2-12 are intact and symmetric; no gross sensory deficits   SKIN: No rashes; no palpable lesions      LABS:                        10.7   7.57  )-----------( 279      ( 11 Nov 2023 16:07 )             35.4      11-12    136  |  101  |  53<H>  ----------------------------<  164<H>  4.3   |  23  |  2.91<H>    Ca    8.5      12 Nov 2023 06:52    TPro  5.6<L>  /  Alb  3.3  /  TBili  0.4  /  DBili  x   /  AST  13  /  ALT  29  /  AlkPhos  90  11-12       CAPILLARY BLOOD GLUCOSE      POCT Blood Glucose.: 157 mg/dL (12 Nov 2023 07:40)  POCT Blood Glucose.: 163 mg/dL (12 Nov 2023 06:41)  POCT Blood Glucose.: 192 mg/dL (11 Nov 2023 23:54)  POCT Blood Glucose.: 254 mg/dL (11 Nov 2023 21:31)  POCT Blood Glucose.: 233 mg/dL (11 Nov 2023 17:17)  POCT Blood Glucose.: 169 mg/dL (11 Nov 2023 11:45)      RADIOLOGY & ADDITIONAL TESTS:    Imaging Personally Reviewed:  Consultant(s) Notes Reviewed:    Care Discussed with Consultants/Other Providers:

## 2023-11-12 NOTE — PROGRESS NOTE ADULT - ASSESSMENT
Exam: stable. very mild left facial o/w intact, AOx3 no drift GALLEGOS 5/5, SILT    Plan:  -Preop for angio Mon vs Tues  -Please clear from a cardiac perspective re: preop risk stratification, thanks

## 2023-11-12 NOTE — PROGRESS NOTE ADULT - PROBLEM SELECTOR PLAN 1
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 Status post [facility]-> 95% discrete proximal LAD disease and sequential multifocal disease with good distal target, 80-90% proximal LCx disease, severe multifocal RCA. Limited viability on MRI. Echocardiogram with LVEF <20%, rWMA & LV thrombus  - HF team & EP card f/u plans appreciated, continue with GDMT (Metoprolol ER 25mg/d, ARB/ARNI- none due to LAURA, No MRA, eventual SGLT2i)  - Hydral 75mg tid and ISDN 30mg TID  - Inotropes: Off milrinone since 11/1  - Advanced therapies- holding off on launching VAD/transplant eval given cognitive issues   - No longer a PCI candidate due to contraindications for ECMO, which likely would be required as per EP note. Will reassess following completion of amiodarone load.    - daily weight and I/O  - Pt euvolemic with Cr. Uptrending. Will hold further diuresis for now. He was receiving bumex 2mg IV bid  Creatinine Trend: 2.91<--, 2.63<--, 2.38<--, 2.12<--, 2.21<--, 2.19<--

## 2023-11-12 NOTE — PROGRESS NOTE ADULT - ASSESSMENT
59 y/o M PMH CVA with mild left sided deficits, HTN, DM2, vertigo, HLD, Mobitz II s/p Medtronic dual chamber primary prevention ICD (12/21/22) initially presented to OSH for SOB c/f ADHF vs. PNA, later found to have NSTEMI transferred to [facility] for LHC evaluation. 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 Readmitted to CICU 11/8 for sustained MMVT on the floor, ~150 bpm falling into monitor zone w/o therapy delivered and spontaneously terminated. He was started on Amiodarone load on 11/8. On 11/8, he continued to have frequent MM nonsustained VT, and also sustained MMVT @ rate ~150bpm w/ spontaneous termination. SR 70-80s, no VT. Continue Amiodarone load 400 mg Q8H to 10gm (received 3.2 gm as of this AM) followed by 200 mg daily - While on Amio, needs monitoring of LFT's and TFT's. Will need monitoring of PFT's and yearly ophthalmologic eval as outpatient as well. HF following, currently holding off on launching VAD/transplant eval given cognitive issues. HF spoke to IC team, pt not a good candidate for palliative PCI as significant LGE and pt would need ECMO for support which is very high risk in pt with recent strokes / LV thrombus / severe cerebrovascular disease. Will re-eval after Amiodarone load and decision re: revascularization/AT. Close telemetry monitoring. Maintain K>4.0 and Mg>2.0.  Asked by vascular to clear patient for angiogram.  Recommended that heart failure team be involved. Patient at increased risk for any procedure given history of monomorphic VT requiring amiodarone for suppression.  Although risk elevated, at acceptable risk from an EP point of view given that patient might have significant vertebral basilar territory at risk.

## 2023-11-12 NOTE — PROGRESS NOTE ADULT - SUBJECTIVE AND OBJECTIVE BOX
Patient seen and examined at bedside.    --Anticoagulation--  warfarin 5 milliGRAM(s) Oral once    T(C): 36.7 (11-12-23 @ 11:14), Max: 36.9 (11-11-23 @ 20:53)  HR: 77 (11-12-23 @ 11:14) (77 - 85)  BP: 106/64 (11-12-23 @ 11:14) (96/60 - 106/67)  RR: 18 (11-12-23 @ 11:14) (18 - 18)  SpO2: 97% (11-12-23 @ 11:14) (94% - 97%)  Wt(kg): --    Exam: stable. very mild left facial o/w intact, AOx3 no drift GALLEGOS 5/5MARY KATE

## 2023-11-12 NOTE — PROGRESS NOTE ADULT - ASSESSMENT
58M h/o prior CVA (2012) with mild L sided weakness, Mobitz II s/p Medtronic dual chamber ICD (12/21/22) DM2, HTN, HLD, admitted in CCU as a transfer from [facility] for NSTEMI on 10/13/23. His hospital course has been complicated by acute systolic dysfunction, LV thrombus, and possible acute stroke. He was found to have triple vessel disease on LHC and a RHC with elevated filling pressures. Was also found to have MMVT and was evaluated by EP card, HF team, Pulmonary, Neurology and Palliative care.   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

## 2023-11-13 LAB
ANION GAP SERPL CALC-SCNC: 13 MMOL/L — SIGNIFICANT CHANGE UP (ref 5–17)
ANION GAP SERPL CALC-SCNC: 13 MMOL/L — SIGNIFICANT CHANGE UP (ref 5–17)
APTT BLD: 29.1 SEC — SIGNIFICANT CHANGE UP (ref 24.5–35.6)
APTT BLD: 29.1 SEC — SIGNIFICANT CHANGE UP (ref 24.5–35.6)
BUN SERPL-MCNC: 57 MG/DL — HIGH (ref 7–23)
BUN SERPL-MCNC: 57 MG/DL — HIGH (ref 7–23)
CALCIUM SERPL-MCNC: 8.9 MG/DL — SIGNIFICANT CHANGE UP (ref 8.4–10.5)
CALCIUM SERPL-MCNC: 8.9 MG/DL — SIGNIFICANT CHANGE UP (ref 8.4–10.5)
CHLORIDE SERPL-SCNC: 100 MMOL/L — SIGNIFICANT CHANGE UP (ref 96–108)
CHLORIDE SERPL-SCNC: 100 MMOL/L — SIGNIFICANT CHANGE UP (ref 96–108)
CO2 SERPL-SCNC: 22 MMOL/L — SIGNIFICANT CHANGE UP (ref 22–31)
CO2 SERPL-SCNC: 22 MMOL/L — SIGNIFICANT CHANGE UP (ref 22–31)
CREAT SERPL-MCNC: 3.13 MG/DL — HIGH (ref 0.5–1.3)
CREAT SERPL-MCNC: 3.13 MG/DL — HIGH (ref 0.5–1.3)
EGFR: 22 ML/MIN/1.73M2 — LOW
EGFR: 22 ML/MIN/1.73M2 — LOW
GLUCOSE BLDC GLUCOMTR-MCNC: 138 MG/DL — HIGH (ref 70–99)
GLUCOSE BLDC GLUCOMTR-MCNC: 138 MG/DL — HIGH (ref 70–99)
GLUCOSE BLDC GLUCOMTR-MCNC: 176 MG/DL — HIGH (ref 70–99)
GLUCOSE BLDC GLUCOMTR-MCNC: 184 MG/DL — HIGH (ref 70–99)
GLUCOSE BLDC GLUCOMTR-MCNC: 184 MG/DL — HIGH (ref 70–99)
GLUCOSE BLDC GLUCOMTR-MCNC: 212 MG/DL — HIGH (ref 70–99)
GLUCOSE BLDC GLUCOMTR-MCNC: 212 MG/DL — HIGH (ref 70–99)
GLUCOSE BLDC GLUCOMTR-MCNC: 227 MG/DL — HIGH (ref 70–99)
GLUCOSE BLDC GLUCOMTR-MCNC: 227 MG/DL — HIGH (ref 70–99)
GLUCOSE SERPL-MCNC: 167 MG/DL — HIGH (ref 70–99)
GLUCOSE SERPL-MCNC: 167 MG/DL — HIGH (ref 70–99)
INR BLD: 1.5 RATIO — HIGH (ref 0.85–1.18)
INR BLD: 1.5 RATIO — HIGH (ref 0.85–1.18)
POTASSIUM SERPL-MCNC: 4.5 MMOL/L — SIGNIFICANT CHANGE UP (ref 3.5–5.3)
POTASSIUM SERPL-MCNC: 4.5 MMOL/L — SIGNIFICANT CHANGE UP (ref 3.5–5.3)
POTASSIUM SERPL-SCNC: 4.5 MMOL/L — SIGNIFICANT CHANGE UP (ref 3.5–5.3)
POTASSIUM SERPL-SCNC: 4.5 MMOL/L — SIGNIFICANT CHANGE UP (ref 3.5–5.3)
PROTHROM AB SERPL-ACNC: 15.6 SEC — HIGH (ref 9.5–13)
PROTHROM AB SERPL-ACNC: 15.6 SEC — HIGH (ref 9.5–13)
SODIUM SERPL-SCNC: 135 MMOL/L — SIGNIFICANT CHANGE UP (ref 135–145)
SODIUM SERPL-SCNC: 135 MMOL/L — SIGNIFICANT CHANGE UP (ref 135–145)

## 2023-11-13 PROCEDURE — 99233 SBSQ HOSP IP/OBS HIGH 50: CPT

## 2023-11-13 RX ORDER — WARFARIN SODIUM 2.5 MG/1
5 TABLET ORAL ONCE
Refills: 0 | Status: COMPLETED | OUTPATIENT
Start: 2023-11-13 | End: 2023-11-13

## 2023-11-13 RX ORDER — BUMETANIDE 0.25 MG/ML
2 INJECTION INTRAMUSCULAR; INTRAVENOUS ONCE
Refills: 0 | Status: COMPLETED | OUTPATIENT
Start: 2023-11-13 | End: 2023-11-13

## 2023-11-13 RX ORDER — SODIUM CHLORIDE 5 G/100ML
150 INJECTION, SOLUTION INTRAVENOUS ONCE
Refills: 0 | Status: COMPLETED | OUTPATIENT
Start: 2023-11-13 | End: 2023-11-13

## 2023-11-13 RX ADMIN — Medication 1: at 07:54

## 2023-11-13 RX ADMIN — PANTOPRAZOLE SODIUM 40 MILLIGRAM(S): 20 TABLET, DELAYED RELEASE ORAL at 05:40

## 2023-11-13 RX ADMIN — BUMETANIDE 2 MILLIGRAM(S): 0.25 INJECTION INTRAMUSCULAR; INTRAVENOUS at 20:00

## 2023-11-13 RX ADMIN — AMIODARONE HYDROCHLORIDE 400 MILLIGRAM(S): 400 TABLET ORAL at 05:38

## 2023-11-13 RX ADMIN — AMIODARONE HYDROCHLORIDE 400 MILLIGRAM(S): 400 TABLET ORAL at 15:19

## 2023-11-13 RX ADMIN — ISOSORBIDE DINITRATE 30 MILLIGRAM(S): 5 TABLET ORAL at 17:38

## 2023-11-13 RX ADMIN — ATORVASTATIN CALCIUM 80 MILLIGRAM(S): 80 TABLET, FILM COATED ORAL at 21:17

## 2023-11-13 RX ADMIN — SODIUM CHLORIDE 300 MILLILITER(S): 5 INJECTION, SOLUTION INTRAVENOUS at 20:01

## 2023-11-13 RX ADMIN — CHLORHEXIDINE GLUCONATE 1 APPLICATION(S): 213 SOLUTION TOPICAL at 05:56

## 2023-11-13 RX ADMIN — Medication 75 MILLIGRAM(S): at 21:17

## 2023-11-13 RX ADMIN — Medication 25 MILLIGRAM(S): at 12:30

## 2023-11-13 RX ADMIN — Medication 0: at 11:42

## 2023-11-13 RX ADMIN — Medication 75 MILLIGRAM(S): at 15:19

## 2023-11-13 RX ADMIN — WARFARIN SODIUM 5 MILLIGRAM(S): 2.5 TABLET ORAL at 21:22

## 2023-11-13 RX ADMIN — Medication 1: at 17:35

## 2023-11-13 RX ADMIN — SENNA PLUS 2 TABLET(S): 8.6 TABLET ORAL at 21:22

## 2023-11-13 RX ADMIN — AMIODARONE HYDROCHLORIDE 400 MILLIGRAM(S): 400 TABLET ORAL at 21:17

## 2023-11-13 RX ADMIN — Medication 81 MILLIGRAM(S): at 17:37

## 2023-11-13 RX ADMIN — INSULIN GLARGINE 15 UNIT(S): 100 INJECTION, SOLUTION SUBCUTANEOUS at 21:19

## 2023-11-13 RX ADMIN — Medication 6 UNIT(S): at 18:09

## 2023-11-13 RX ADMIN — TAMSULOSIN HYDROCHLORIDE 0.4 MILLIGRAM(S): 0.4 CAPSULE ORAL at 21:21

## 2023-11-13 RX ADMIN — DAPAGLIFLOZIN 10 MILLIGRAM(S): 10 TABLET, FILM COATED ORAL at 05:38

## 2023-11-13 RX ADMIN — ISOSORBIDE DINITRATE 30 MILLIGRAM(S): 5 TABLET ORAL at 12:30

## 2023-11-13 NOTE — CONSULT NOTE ADULT - SUBJECTIVE AND OBJECTIVE BOX
seen examined. pt with LAURA. to hold neuro angio today unless emergently needed .pt  with LAURA.  full consult to follow  seen examined. pt with LAURA. to hold neuro angio today unless emergently needed .pt  with LAURA.  full consult to follow       [facility]  [phone]  NEPHROLOGY      INITIAL CONSULT NOTE  --------------------------------------------------------------------------------  HPI:        57 yo Male pmhx CVA with mild left sided deficits, HTN, DM2, vertigo, HLD, Mobitz II s/pp Medtronic dual chamber ICD (12/21/22) initially presented to [facility] from home with complaints of dyspnea and chest pain and was admitted w/ acute hypoxic respiratory failure likely due to acute pulmonary edema due to acute HFrEF in setting of acute NSTEMI, LAURA and enterovirus infection. Pt with brief MICU stay at [facility] requiring bipap (no intubation), was treated for PNA with cefepime, and was found to have TTE done 9/26/23 showing EF 20% with severely decreased LVSF, multiple regional wall motion abnormalities, and elevated LV end diastolic pressure. Pt was transferred to [facility] for cardiac evaluation. LHC performed which revealed severe 3v CAD with critical proximal LAD involvement. CTS was consulted for CA Found to have LV thrombus. AMS prompting CT head with angio on 11/6 revealing R medial cerebellar infarct. MRI showing evolving PICA stroke and severe stenosis of L vertebral artery and occlusion o nondominant R vertebral artery.R cerebellar/PICA infarct as well as L parietal embolic infarct, likely cardioembolic neurosx plans for cerebral angio. given abn creatinine renal consult called.     PAST HISTORY  --------------------------------------------------------------------------------  PAST MEDICAL & SURGICAL HISTORY:  CVA (cerebrovascular accident)      T2DM (type 2 diabetes mellitus)      HTN (hypertension)      HLD (hyperlipidemia)      S/P cystoscopy      S/P hernia repair        FAMILY HISTORY:    PAST SOCIAL HISTORY:  denies tobacco or etoh    ALLERGIES & MEDICATIONS  --------------------------------------------------------------------------------  Allergies    No Known Allergies    Intolerances      Standing Inpatient Medications  aMIOdarone    Tablet   Oral   aMIOdarone    Tablet 400 milliGRAM(s) Oral every 8 hours  aspirin enteric coated 81 milliGRAM(s) Oral daily  atorvastatin 80 milliGRAM(s) Oral at bedtime  chlorhexidine 2% Cloths 1 Application(s) Topical <User Schedule>  dapagliflozin 10 milliGRAM(s) Oral every 24 hours  hydrALAZINE 75 milliGRAM(s) Oral every 8 hours  insulin glargine Injectable (LANTUS) 15 Unit(s) SubCutaneous at bedtime  insulin lispro (ADMELOG) corrective regimen sliding scale   SubCutaneous three times a day before meals  insulin lispro (ADMELOG) corrective regimen sliding scale   SubCutaneous at bedtime  insulin lispro Injectable (ADMELOG) 6 Unit(s) SubCutaneous three times a day before meals  isosorbide   dinitrate Tablet (ISORDIL) 30 milliGRAM(s) Oral three times a day  metoprolol succinate ER 25 milliGRAM(s) Oral daily  pantoprazole    Tablet 40 milliGRAM(s) Oral before breakfast  senna 2 Tablet(s) Oral at bedtime  tamsulosin 0.4 milliGRAM(s) Oral at bedtime    PRN Inpatient Medications  polyethylene glycol 3350 17 Gram(s) Oral daily PRN      REVIEW OF SYSTEMS  --------------------------------------------------------------------------------  Gen: No  fevers/chills   Skin: No rashes  Head/Eyes/Ears/Mouth: No headache; Normal hearing;  No sinus pain/discomfort, sore throat  Respiratory: No dyspnea, cough, wheezing, hemoptysis  CV: No chest pain, orthopnea  GI: No abdominal pain, diarrhea, nausea, vomiting, melena, hematochezia  : No dysuria, decrease urination or hesitancy urinating  hematuria, nocturia  MSK: No joint pain/swelling; no back pain  Neuro: No dizziness/lightheadedness  VITALS/PHYSICAL EXAM  --------------------------------------------------------------------------------  T(C): 37.1 (11-14-23 @ 04:29), Max: 37.1 (11-13-23 @ 12:07)  HR: 72 (11-14-23 @ 04:29) (72 - 81)  BP: 97/66 (11-14-23 @ 04:29) (93/54 - 107/66)  RR: 18 (11-14-23 @ 04:29) (16 - 18)  SpO2: 97% (11-14-23 @ 04:29) (95% - 98%)  Wt(kg): --        11-13-23 @ 07:01  -  11-14-23 @ 07:00  --------------------------------------------------------  IN: 0 mL / OUT: 400 mL / NET: -400 mL      Physical Exam:  	Gen: Non toxic comfortable appearing   	no jvd  	Pulm: decrease bs  no rales or ronchi or wheezing  	CV: RRR, S1S2; no rub  	Back: No CVA tenderness; no sacral edema  	Abd: +BS, soft, nontender/nondistended  	: No suprapubic tenderness  	UE: Warm, no cyanosis  no clubbing,  no edema; no asterixis  	LE: Warm, no cyanosis  no clubbing, no edema  	Neuro: alert and oriented. speech coherent   	Psych: Normal affect and mood  	Skin: Warm, no decrease skin turgor       LABS/STUDIES  --------------------------------------------------------------------------------               PTT: 29.1       [11-13-23 @ 06:15]      Basic Metabolic Panel in AM (11.13.23 @ 06:13)   Sodium: 135 mmol/L  Potassium: 4.5 mmol/L  Chloride: 100 mmol/L  Carbon Dioxide: 22 mmol/L  Anion Gap: 13 mmol/L  Blood Urea Nitrogen: 57 mg/dL  Creatinine: 3.13 mg/dL  Glucose: 167 mg/dL  Calcium: 8.9 mg/dL  eGFR: 22:  Creatinine Trend:  SCr 3.13 [11-13 @ 06:13]  SCr 2.91 [11-12 @ 06:52]  SCr 2.63 [11-11 @ 06:37]  SCr 2.38 [11-10 @ 06:32]    Urinalysis (10.28.23 @ 04:45)   pH Urine: 6.0  Glucose Qualitative, Urine: 1000 mg/dL  Blood, Urine: Negative  Color: Light Yellow  Urine Appearance: Clear  Bilirubin: Negative  Ketone - Urine: Trace  Specific Gravity: 1.021  Protein, Urine: Trace  Urobilinogen: Negative  Nitrite: Negative  Leukocyte Esterase Concentration: Negative  TSH 0.34      [11-10-23 @ 06:32]  Lipid: chol 167, TG 80, HDL 52, LDL --      [10-17-23 @ 08:16]      Syphilis Screen (Treponema Pallidum Ab) Negative      [10-29-23 @ 00:03]

## 2023-11-13 NOTE — PROGRESS NOTE ADULT - SUBJECTIVE AND OBJECTIVE BOX
Patient seen and examined at bedside.    --Anticoagulation--    T(C): 36.8 (11-13-23 @ 04:39), Max: 37 (11-12-23 @ 20:30)  HR: 79 (11-13-23 @ 04:39) (51 - 79)  BP: 101/66 (11-13-23 @ 04:39) (95/51 - 101/66)  RR: 18 (11-13-23 @ 04:39) (18 - 18)  SpO2: 98% (11-13-23 @ 04:39) (96% - 98%)  Wt(kg): --    Exam: stable, very mild left facial o/w intact, AOx2, no drift GALLEGOS 5/5, SILT.

## 2023-11-13 NOTE — PROGRESS NOTE ADULT - PROBLEM SELECTOR PLAN 1
- Etiology: ischemic with LVEF <20%  - Start Bumex 2 mg IV QD  - 3% Hypertonic saline 150 cc over 1hour today  - Continue Toprol XL 25 mg QD  - Continue HDZN 75 mg TID and ISDN 30 mg TID, hold for SBP <90  - Continue Farxiga 10 mg QD  - Advanced therapies: holding off on launching VAD/transplant eval given cognitive issues however will continue to reassess. Of note he has good support and no clear psychosocial red flags. ABO AB.

## 2023-11-13 NOTE — PROGRESS NOTE ADULT - SUBJECTIVE AND OBJECTIVE BOX
24H hour events:     MEDICATIONS:  aMIOdarone    Tablet   Oral   aMIOdarone    Tablet 400 milliGRAM(s) Oral every 8 hours  aspirin enteric coated 81 milliGRAM(s) Oral daily  hydrALAZINE 75 milliGRAM(s) Oral every 8 hours  isosorbide   dinitrate Tablet (ISORDIL) 30 milliGRAM(s) Oral three times a day  metoprolol succinate ER 25 milliGRAM(s) Oral daily  pantoprazole    Tablet 40 milliGRAM(s) Oral before breakfast  polyethylene glycol 3350 17 Gram(s) Oral daily PRN  senna 2 Tablet(s) Oral at bedtime  atorvastatin 80 milliGRAM(s) Oral at bedtime  dapagliflozin 10 milliGRAM(s) Oral every 24 hours  insulin glargine Injectable (LANTUS) 15 Unit(s) SubCutaneous at bedtime  insulin lispro (ADMELOG) corrective regimen sliding scale   SubCutaneous three times a day before meals  insulin lispro (ADMELOG) corrective regimen sliding scale   SubCutaneous at bedtime  insulin lispro Injectable (ADMELOG) 6 Unit(s) SubCutaneous three times a day before meals  chlorhexidine 2% Cloths 1 Application(s) Topical <User Schedule>  tamsulosin 0.4 milliGRAM(s) Oral at bedtime      REVIEW OF SYSTEMS:  Complete 12 point ROS negative.    PHYSICAL EXAM:  T(C): 36.8 (11-13-23 @ 04:39), Max: 37 (11-12-23 @ 20:30)  HR: 79 (11-13-23 @ 04:39) (51 - 79)  BP: 101/66 (11-13-23 @ 04:39) (95/51 - 106/64)  RR: 18 (11-13-23 @ 04:39) (18 - 18)  SpO2: 98% (11-13-23 @ 04:39) (96% - 98%)  Wt(kg): --  I&O's Summary    12 Nov 2023 07:01  -  13 Nov 2023 07:00  --------------------------------------------------------  IN: 480 mL / OUT: 250 mL / NET: 230 mL        Appearance: Normal	  HEENT:  PERRL, EOMI	  Cardiovascular: Normal S1 S2, No JVD, No murmurs, No edema  Respiratory: Lungs clear to auscultation	  Psychiatry: A & O x 2, Mood & affect appropriate  Gastrointestinal:  Soft, Non-tender, + BS	  Skin: No rashes, No ecchymoses, No cyanosis	  Neurologic: Non-focal  Extremities: No clubbing, cyanosis or edema  Vascular: Peripheral pulses palpable 2+ bilaterally        LABS:	 	    CBC Full  -  ( 11 Nov 2023 16:07 )  WBC Count : 7.57 K/uL  Hemoglobin : 10.7 g/dL  Hematocrit : 35.4 %  Platelet Count - Automated : 279 K/uL  Mean Cell Volume : 82.7 fl  Mean Cell Hemoglobin : 25.0 pg  Mean Cell Hemoglobin Concentration : 30.2 gm/dL  Auto Neutrophil # : x  Auto Lymphocyte # : x  Auto Monocyte # : x  Auto Eosinophil # : x  Auto Basophil # : x  Auto Neutrophil % : x  Auto Lymphocyte % : x  Auto Monocyte % : x  Auto Eosinophil % : x  Auto Basophil % : x    11-13    135  |  100  |  57<H>  ----------------------------<  167<H>  4.5   |  22  |  3.13<H>  11-12    136  |  101  |  53<H>  ----------------------------<  164<H>  4.3   |  23  |  2.91<H>    Ca    8.9      13 Nov 2023 06:13  Ca    8.5      12 Nov 2023 06:52    TPro  5.6<L>  /  Alb  3.3  /  TBili  0.4  /  DBili  x   /  AST  13  /  ALT  29  /  AlkPhos  90  11-12      TELEMETRY: SR 70s, no NSVT since 11/11 afternoon (7 beats) 	      < from: TTE W or WO Ultrasound Enhancing Agent (11.08.23 @ 07:07) >  TRANSTHORACIC ECHOCARDIOGRAM REPORT  ________________________________________________________________________________                                      _______       Pt. Name:       BRANDEN MARRUFO   Study Date:    11/8/2023  MRN:            [id number]   YOB: 1965  Accession #:    [id number]    Age:           58 years  Account#:       [id number] Gender:        M  Heart Rate:                  Height:        72.00 in (182.88 cm)  Rhythm:                      Weight:        154.00 lb (69.85 kg)  Blood Pressure: 122/80 mmHg  BSA/BMI:       1.91 m² / 20.89 kg/m²  ________________________________________________________________________________________  Referring Physician:    [id number] Kojo Han  Interpreting Physician: Puneet Beltrán M.D.  Primary Sonographer:    Jany Goel RDCS    CPT:                ECHO TTE W CON FU LTD - .m;LIMITED SPECTRAL -                      20011.m;DEFINITY ECHO CONTRAST PER ML WASTED -                      .m;DEFINITY ECHO CONTRASTPER ML - .m  Indication(s):      Heart failure, unspecified - I50.9  Procedure:          Limited transthoracic echocardiogram.  Ordering Location:  CICU  Contrast Injection: Verbal consent was obtained for injection of Ultrasonic       Enhancing Agent following a discussion of risks and                      benefits.                      Endocardial visualization enhanced with 2 ml of Definity                      Ultrasound enhancing agent (Lot#:[id number] Discarded Dose:8ml).  UEAReaction:       Patient had no adverse reaction after injection of                      Ultrasound Enhancing Agent.  Study Information:  Image quality for this study is adequate.    _______________________________________________________________________________________     CONCLUSIONS:      1. Left ventricular systolic function is severely decreased with an ejection fraction visually estimated at <20 %. Global left ventricular hypokinesis.   2. Compared to the transthoracic echocardiogram performed on 10/26/2023 the thrombus is much smaller.    ________________________________________________________________________________________  FINDINGS:     Left Ventricle:  Left ventricular systolic function is severely decreased with an ejection fraction visually estimated at <20%. There is global left ventricular hypokinesis. There is a small left ventricular thrombus.  ____________________________________________________________________  Quantitative Data:  Left Ventricle Measurements: (Indexed to BSA)     Visualized LV EF%: <20%       LVOT / RVOT/ Qp/Qs Data: (Indexed to BSA)  LVOT Diameter: 2.30 cm  LVOT Vmax:     0.58 m/s  LVOT VTI:      8.07 cm  LVOT SV:       33.5 ml  17.59 ml/m²    < end of copied text >       24H hour events:     MEDICATIONS:  aMIOdarone    Tablet   Oral   aMIOdarone    Tablet 400 milliGRAM(s) Oral every 8 hours  aspirin enteric coated 81 milliGRAM(s) Oral daily  hydrALAZINE 75 milliGRAM(s) Oral every 8 hours  isosorbide   dinitrate Tablet (ISORDIL) 30 milliGRAM(s) Oral three times a day  metoprolol succinate ER 25 milliGRAM(s) Oral daily  pantoprazole    Tablet 40 milliGRAM(s) Oral before breakfast  polyethylene glycol 3350 17 Gram(s) Oral daily PRN  senna 2 Tablet(s) Oral at bedtime  atorvastatin 80 milliGRAM(s) Oral at bedtime  dapagliflozin 10 milliGRAM(s) Oral every 24 hours  insulin glargine Injectable (LANTUS) 15 Unit(s) SubCutaneous at bedtime  insulin lispro (ADMELOG) corrective regimen sliding scale   SubCutaneous three times a day before meals  insulin lispro (ADMELOG) corrective regimen sliding scale   SubCutaneous at bedtime  insulin lispro Injectable (ADMELOG) 6 Unit(s) SubCutaneous three times a day before meals  chlorhexidine 2% Cloths 1 Application(s) Topical <User Schedule>  tamsulosin 0.4 milliGRAM(s) Oral at bedtime      REVIEW OF SYSTEMS:  Complete 12 point ROS negative.    PHYSICAL EXAM:  T(C): 36.8 (11-13-23 @ 04:39), Max: 37 (11-12-23 @ 20:30)  HR: 79 (11-13-23 @ 04:39) (51 - 79)  BP: 101/66 (11-13-23 @ 04:39) (95/51 - 106/64)  RR: 18 (11-13-23 @ 04:39) (18 - 18)  SpO2: 98% (11-13-23 @ 04:39) (96% - 98%)  Wt(kg): --  I&O's Summary    12 Nov 2023 07:01  -  13 Nov 2023 07:00  --------------------------------------------------------  IN: 480 mL / OUT: 250 mL / NET: 230 mL        Appearance: Normal	  HEENT:  PERRL, EOMI	  Cardiovascular: Normal S1 S2, No JVD, No murmurs, No edema  Respiratory: Lungs clear to auscultation	  Psychiatry: A & O x 2, Mood & affect appropriate  Gastrointestinal:  Soft, Non-tender, + BS	  Skin: No rashes, No ecchymoses, No cyanosis	  Neurologic: Non-focal  Extremities: No clubbing, cyanosis or edema  Vascular: Peripheral pulses palpable 2+ bilaterally        LABS:	 	    CBC Full  -  ( 11 Nov 2023 16:07 )  WBC Count : 7.57 K/uL  Hemoglobin : 10.7 g/dL  Hematocrit : 35.4 %  Platelet Count - Automated : 279 K/uL  Mean Cell Volume : 82.7 fl  Mean Cell Hemoglobin : 25.0 pg  Mean Cell Hemoglobin Concentration : 30.2 gm/dL  Auto Neutrophil # : x  Auto Lymphocyte # : x  Auto Monocyte # : x  Auto Eosinophil # : x  Auto Basophil # : x  Auto Neutrophil % : x  Auto Lymphocyte % : x  Auto Monocyte % : x  Auto Eosinophil % : x  Auto Basophil % : x    11-13    135  |  100  |  57<H>  ----------------------------<  167<H>  4.5   |  22  |  3.13<H>  11-12    136  |  101  |  53<H>  ----------------------------<  164<H>  4.3   |  23  |  2.91<H>    Ca    8.9      13 Nov 2023 06:13  Ca    8.5      12 Nov 2023 06:52    TPro  5.6<L>  /  Alb  3.3  /  TBili  0.4  /  DBili  x   /  AST  13  /  ALT  29  /  AlkPhos  90  11-12      TELEMETRY: SR 70s, no NSVT since 11/11 afternoon (7 beats) 	      < from: TTE W or WO Ultrasound Enhancing Agent (11.08.23 @ 07:07) >  TRANSTHORACIC ECHOCARDIOGRAM REPORT  ________________________________________________________________________________                                      _______       Pt. Name:       BRANDEN MARRUFO   Study Date:    11/8/2023  MRN:            [id number]   YOB: 1965  Accession #:    [id number]    Age:           58 years  Account#:       [id number] Gender:        M  Heart Rate:                  Height:        72.00 in (182.88 cm)  Rhythm:                      Weight:        154.00 lb (69.85 kg)  Blood Pressure: 122/80 mmHg  BSA/BMI:       1.91 m² / 20.89 kg/m²  ________________________________________________________________________________________  Referring Physician:    [id number] Kojo Han  Interpreting Physician: Puneet Beltrán M.D.  Primary Sonographer:    Jany Goel RDCS    CPT:                ECHO TTE W CON FU LTD - .m;LIMITED SPECTRAL -                      29975.m;DEFINITY ECHO CONTRAST PER ML WASTED -                      .m;DEFINITY ECHO CONTRASTPER ML - .m  Indication(s):      Heart failure, unspecified - I50.9  Procedure:          Limited transthoracic echocardiogram.  Ordering Location:  CICU  Contrast Injection: Verbal consent was obtained for injection of Ultrasonic       Enhancing Agent following a discussion of risks and                      benefits.                      Endocardial visualization enhanced with 2 ml of Definity                      Ultrasound enhancing agent (Lot#:[id number] Discarded Dose:8ml).  UEAReaction:       Patient had no adverse reaction after injection of                      Ultrasound Enhancing Agent.  Study Information:  Image quality for this study is adequate.    _______________________________________________________________________________________     CONCLUSIONS:      1. Left ventricular systolic function is severely decreased with an ejection fraction visually estimated at <20 %. Global left ventricular hypokinesis.   2. Compared to the transthoracic echocardiogram performed on 10/26/2023 the thrombus is much smaller.    ________________________________________________________________________________________  FINDINGS:     Left Ventricle:  Left ventricular systolic function is severely decreased with an ejection fraction visually estimated at <20%. There is global left ventricular hypokinesis. There is a small left ventricular thrombus.  ____________________________________________________________________  Quantitative Data:  Left Ventricle Measurements: (Indexed to BSA)     Visualized LV EF%: <20%       LVOT / RVOT/ Qp/Qs Data: (Indexed to BSA)  LVOT Diameter: 2.30 cm  LVOT Vmax:     0.58 m/s  LVOT VTI:      8.07 cm  LVOT SV:       33.5 ml  17.59 ml/m²    < end of copied text >       24H hour events:     MEDICATIONS:  aMIOdarone    Tablet   Oral   aMIOdarone    Tablet 400 milliGRAM(s) Oral every 8 hours  aspirin enteric coated 81 milliGRAM(s) Oral daily  hydrALAZINE 75 milliGRAM(s) Oral every 8 hours  isosorbide   dinitrate Tablet (ISORDIL) 30 milliGRAM(s) Oral three times a day  metoprolol succinate ER 25 milliGRAM(s) Oral daily  pantoprazole    Tablet 40 milliGRAM(s) Oral before breakfast  polyethylene glycol 3350 17 Gram(s) Oral daily PRN  senna 2 Tablet(s) Oral at bedtime  atorvastatin 80 milliGRAM(s) Oral at bedtime  dapagliflozin 10 milliGRAM(s) Oral every 24 hours  insulin glargine Injectable (LANTUS) 15 Unit(s) SubCutaneous at bedtime  insulin lispro (ADMELOG) corrective regimen sliding scale   SubCutaneous three times a day before meals  insulin lispro (ADMELOG) corrective regimen sliding scale   SubCutaneous at bedtime  insulin lispro Injectable (ADMELOG) 6 Unit(s) SubCutaneous three times a day before meals  chlorhexidine 2% Cloths 1 Application(s) Topical <User Schedule>  tamsulosin 0.4 milliGRAM(s) Oral at bedtime      REVIEW OF SYSTEMS:  Complete 12 point ROS negative.    PHYSICAL EXAM:  T(C): 36.8 (11-13-23 @ 04:39), Max: 37 (11-12-23 @ 20:30)  HR: 79 (11-13-23 @ 04:39) (51 - 79)  BP: 101/66 (11-13-23 @ 04:39) (95/51 - 106/64)  RR: 18 (11-13-23 @ 04:39) (18 - 18)  SpO2: 98% (11-13-23 @ 04:39) (96% - 98%)  Wt(kg): --  I&O's Summary    12 Nov 2023 07:01  -  13 Nov 2023 07:00  --------------------------------------------------------  IN: 480 mL / OUT: 250 mL / NET: 230 mL        Appearance: Normal	  HEENT:  PERRL, EOMI	  Cardiovascular: Normal S1 S2, No JVD, No murmurs, No edema  Respiratory: Lungs clear to auscultation	  Psychiatry: A & O x 2, Mood & affect appropriate  Gastrointestinal:  Soft, Non-tender, + BS	  Skin: No rashes, No ecchymoses, No cyanosis	  Neurologic: Non-focal  Extremities: No clubbing, cyanosis or edema  Vascular: Peripheral pulses palpable 2+ bilaterally        LABS:	 	    CBC Full  -  ( 11 Nov 2023 16:07 )  WBC Count : 7.57 K/uL  Hemoglobin : 10.7 g/dL  Hematocrit : 35.4 %  Platelet Count - Automated : 279 K/uL  Mean Cell Volume : 82.7 fl  Mean Cell Hemoglobin : 25.0 pg  Mean Cell Hemoglobin Concentration : 30.2 gm/dL  Auto Neutrophil # : x  Auto Lymphocyte # : x  Auto Monocyte # : x  Auto Eosinophil # : x  Auto Basophil # : x  Auto Neutrophil % : x  Auto Lymphocyte % : x  Auto Monocyte % : x  Auto Eosinophil % : x  Auto Basophil % : x    11-13    135  |  100  |  57<H>  ----------------------------<  167<H>  4.5   |  22  |  3.13<H>  11-12    136  |  101  |  53<H>  ----------------------------<  164<H>  4.3   |  23  |  2.91<H>    Ca    8.9      13 Nov 2023 06:13  Ca    8.5      12 Nov 2023 06:52    TPro  5.6<L>  /  Alb  3.3  /  TBili  0.4  /  DBili  x   /  AST  13  /  ALT  29  /  AlkPhos  90  11-12      TELEMETRY: SR 70s, no NSVT since 11/11 afternoon (7 beats) 	      < from: TTE W or WO Ultrasound Enhancing Agent (11.08.23 @ 07:07) >  TRANSTHORACIC ECHOCARDIOGRAM REPORT  ________________________________________________________________________________                                      _______       Pt. Name:       BRANDEN MARRUFO   Study Date:    11/8/2023  MRN:            [id number]   YOB: 1965  Accession #:    [id number]    Age:           58 years  Account#:       [id number] Gender:        M  Heart Rate:                  Height:        72.00 in (182.88 cm)  Rhythm:                      Weight:        154.00 lb (69.85 kg)  Blood Pressure: 122/80 mmHg  BSA/BMI:       1.91 m² / 20.89 kg/m²  ________________________________________________________________________________________  Referring Physician:    [id number] Kojo Han  Interpreting Physician: Puneet Beltrán M.D.  Primary Sonographer:    Jany Goel RDCS    CPT:                ECHO TTE W CON FU LTD - .m;LIMITED SPECTRAL -                      02514.m;DEFINITY ECHO CONTRAST PER ML WASTED -                      .m;DEFINITY ECHO CONTRASTPER ML - .m  Indication(s):      Heart failure, unspecified - I50.9  Procedure:          Limited transthoracic echocardiogram.  Ordering Location:  CICU  Contrast Injection: Verbal consent was obtained for injection of Ultrasonic       Enhancing Agent following a discussion of risks and                      benefits.                      Endocardial visualization enhanced with 2 ml of Definity                      Ultrasound enhancing agent (Lot#:[id number] Discarded Dose:8ml).  UEAReaction:       Patient had no adverse reaction after injection of                      Ultrasound Enhancing Agent.  Study Information:  Image quality for this study is adequate.    _______________________________________________________________________________________     CONCLUSIONS:      1. Left ventricular systolic function is severely decreased with an ejection fraction visually estimated at <20 %. Global left ventricular hypokinesis.   2. Compared to the transthoracic echocardiogram performed on 10/26/2023 the thrombus is much smaller.    ________________________________________________________________________________________  FINDINGS:     Left Ventricle:  Left ventricular systolic function is severely decreased with an ejection fraction visually estimated at <20%. There is global left ventricular hypokinesis. There is a small left ventricular thrombus.  ____________________________________________________________________  Quantitative Data:  Left Ventricle Measurements: (Indexed to BSA)     Visualized LV EF%: <20%       LVOT / RVOT/ Qp/Qs Data: (Indexed to BSA)  LVOT Diameter: 2.30 cm  LVOT Vmax:     0.58 m/s  LVOT VTI:      8.07 cm  LVOT SV:       33.5 ml  17.59 ml/m²    < end of copied text >       24H hour events: No acute events    MEDICATIONS:  aMIOdarone    Tablet   Oral   aMIOdarone    Tablet 400 milliGRAM(s) Oral every 8 hours  aspirin enteric coated 81 milliGRAM(s) Oral daily  hydrALAZINE 75 milliGRAM(s) Oral every 8 hours  isosorbide   dinitrate Tablet (ISORDIL) 30 milliGRAM(s) Oral three times a day  metoprolol succinate ER 25 milliGRAM(s) Oral daily  pantoprazole    Tablet 40 milliGRAM(s) Oral before breakfast  polyethylene glycol 3350 17 Gram(s) Oral daily PRN  senna 2 Tablet(s) Oral at bedtime  atorvastatin 80 milliGRAM(s) Oral at bedtime  dapagliflozin 10 milliGRAM(s) Oral every 24 hours  insulin glargine Injectable (LANTUS) 15 Unit(s) SubCutaneous at bedtime  insulin lispro (ADMELOG) corrective regimen sliding scale   SubCutaneous three times a day before meals  insulin lispro (ADMELOG) corrective regimen sliding scale   SubCutaneous at bedtime  insulin lispro Injectable (ADMELOG) 6 Unit(s) SubCutaneous three times a day before meals  chlorhexidine 2% Cloths 1 Application(s) Topical <User Schedule>  tamsulosin 0.4 milliGRAM(s) Oral at bedtime      REVIEW OF SYSTEMS:  Complete 12 point ROS negative.    PHYSICAL EXAM:  T(C): 36.8 (11-13-23 @ 04:39), Max: 37 (11-12-23 @ 20:30)  HR: 79 (11-13-23 @ 04:39) (51 - 79)  BP: 101/66 (11-13-23 @ 04:39) (95/51 - 106/64)  RR: 18 (11-13-23 @ 04:39) (18 - 18)  SpO2: 98% (11-13-23 @ 04:39) (96% - 98%)  Wt(kg): --  I&O's Summary    12 Nov 2023 07:01  -  13 Nov 2023 07:00  --------------------------------------------------------  IN: 480 mL / OUT: 250 mL / NET: 230 mL        Appearance: Normal	  HEENT:  PERRL, EOMI	  Cardiovascular: Normal S1 S2, No JVD, No murmurs, No edema  Respiratory: Lungs clear to auscultation	  Psychiatry: A & O x 2, Mood & affect appropriate  Gastrointestinal:  Soft, Non-tender, + BS	  Skin: No rashes, No ecchymoses, No cyanosis	  Neurologic: Non-focal  Extremities: No clubbing, cyanosis or edema  Vascular: Peripheral pulses palpable 2+ bilaterally        LABS:	 	    CBC Full  -  ( 11 Nov 2023 16:07 )  WBC Count : 7.57 K/uL  Hemoglobin : 10.7 g/dL  Hematocrit : 35.4 %  Platelet Count - Automated : 279 K/uL  Mean Cell Volume : 82.7 fl  Mean Cell Hemoglobin : 25.0 pg  Mean Cell Hemoglobin Concentration : 30.2 gm/dL  Auto Neutrophil # : x  Auto Lymphocyte # : x  Auto Monocyte # : x  Auto Eosinophil # : x  Auto Basophil # : x  Auto Neutrophil % : x  Auto Lymphocyte % : x  Auto Monocyte % : x  Auto Eosinophil % : x  Auto Basophil % : x    11-13    135  |  100  |  57<H>  ----------------------------<  167<H>  4.5   |  22  |  3.13<H>  11-12    136  |  101  |  53<H>  ----------------------------<  164<H>  4.3   |  23  |  2.91<H>    Ca    8.9      13 Nov 2023 06:13  Ca    8.5      12 Nov 2023 06:52    TPro  5.6<L>  /  Alb  3.3  /  TBili  0.4  /  DBili  x   /  AST  13  /  ALT  29  /  AlkPhos  90  11-12      TELEMETRY: SR 70s, no NSVT since 11/11 afternoon (7 beats) 	      < from: TTE W or WO Ultrasound Enhancing Agent (11.08.23 @ 07:07) >  TRANSTHORACIC ECHOCARDIOGRAM REPORT  ________________________________________________________________________________                                      _______       Pt. Name:       BRANDEN MARRUFO   Study Date:    11/8/2023  MRN:            [id number]   YOB: 1965  Accession #:    [id number]    Age:           58 years  Account#:       [id number] Gender:        M  Heart Rate:                  Height:        72.00 in (182.88 cm)  Rhythm:                      Weight:        154.00 lb (69.85 kg)  Blood Pressure: 122/80 mmHg  BSA/BMI:       1.91 m² / 20.89 kg/m²  ________________________________________________________________________________________  Referring Physician:    [id number] Kojo Han  Interpreting Physician: Puneet Beltrán M.D.  Primary Sonographer:    Jany Goel RDCS    CPT:                ECHO TTE W CON FU LTD - .m;LIMITED SPECTRAL -                      80671.m;DEFINITY ECHO CONTRAST PER ML WASTED -                      .m;DEFINITY ECHO CONTRASTPER ML - .m  Indication(s):      Heart failure, unspecified - I50.9  Procedure:          Limited transthoracic echocardiogram.  [facility]  Contrast Injection: Verbal consent was obtained for injection of Ultrasonic       Enhancing Agent following a discussion of risks and                      benefits.                      Endocardial visualization enhanced with 2 ml of Definity                      Ultrasound enhancing agent (Lot#:[id number] Discarded Dose:8ml).  UEAReaction:       Patient had no adverse reaction after injection of                      Ultrasound Enhancing Agent.  Study Information:  Image quality for this study is adequate.    _______________________________________________________________________________________     CONCLUSIONS:      1. Left ventricular systolic function is severely decreased with an ejection fraction visually estimated at <20 %. Global left ventricular hypokinesis.   2. Compared to the transthoracic echocardiogram performed on 10/26/2023 the thrombus is much smaller.    ________________________________________________________________________________________  FINDINGS:     Left Ventricle:  Left ventricular systolic function is severely decreased with an ejection fraction visually estimated at <20%. There is global left ventricular hypokinesis. There is a small left ventricular thrombus.  ____________________________________________________________________  Quantitative Data:  Left Ventricle Measurements: (Indexed to BSA)     Visualized LV EF%: <20%       LVOT / RVOT/ Qp/Qs Data: (Indexed to BSA)  LVOT Diameter: 2.30 cm  LVOT Vmax:     0.58 m/s  LVOT VTI:      8.07 cm  LVOT SV:       33.5 ml  17.59 ml/m²    < end of copied text >       24H hour events: No acute events    MEDICATIONS:  aMIOdarone    Tablet   Oral   aMIOdarone    Tablet 400 milliGRAM(s) Oral every 8 hours  aspirin enteric coated 81 milliGRAM(s) Oral daily  hydrALAZINE 75 milliGRAM(s) Oral every 8 hours  isosorbide   dinitrate Tablet (ISORDIL) 30 milliGRAM(s) Oral three times a day  metoprolol succinate ER 25 milliGRAM(s) Oral daily  pantoprazole    Tablet 40 milliGRAM(s) Oral before breakfast  polyethylene glycol 3350 17 Gram(s) Oral daily PRN  senna 2 Tablet(s) Oral at bedtime  atorvastatin 80 milliGRAM(s) Oral at bedtime  dapagliflozin 10 milliGRAM(s) Oral every 24 hours  insulin glargine Injectable (LANTUS) 15 Unit(s) SubCutaneous at bedtime  insulin lispro (ADMELOG) corrective regimen sliding scale   SubCutaneous three times a day before meals  insulin lispro (ADMELOG) corrective regimen sliding scale   SubCutaneous at bedtime  insulin lispro Injectable (ADMELOG) 6 Unit(s) SubCutaneous three times a day before meals  chlorhexidine 2% Cloths 1 Application(s) Topical <User Schedule>  tamsulosin 0.4 milliGRAM(s) Oral at bedtime      REVIEW OF SYSTEMS:  Complete 12 point ROS negative.    PHYSICAL EXAM:  T(C): 36.8 (11-13-23 @ 04:39), Max: 37 (11-12-23 @ 20:30)  HR: 79 (11-13-23 @ 04:39) (51 - 79)  BP: 101/66 (11-13-23 @ 04:39) (95/51 - 106/64)  RR: 18 (11-13-23 @ 04:39) (18 - 18)  SpO2: 98% (11-13-23 @ 04:39) (96% - 98%)  Wt(kg): --  I&O's Summary    12 Nov 2023 07:01  -  13 Nov 2023 07:00  --------------------------------------------------------  IN: 480 mL / OUT: 250 mL / NET: 230 mL        Appearance: Normal	  HEENT:  PERRL, EOMI	  Cardiovascular: Normal S1 S2, No JVD, No murmurs, No edema  Respiratory: Lungs clear to auscultation	  Psychiatry: A & O x 2, Mood & affect appropriate  Gastrointestinal:  Soft, Non-tender, + BS	  Skin: No rashes, No ecchymoses, No cyanosis	  Neurologic: Non-focal  Extremities: No clubbing, cyanosis or edema  Vascular: Peripheral pulses palpable 2+ bilaterally        LABS:	 	    CBC Full  -  ( 11 Nov 2023 16:07 )  WBC Count : 7.57 K/uL  Hemoglobin : 10.7 g/dL  Hematocrit : 35.4 %  Platelet Count - Automated : 279 K/uL  Mean Cell Volume : 82.7 fl  Mean Cell Hemoglobin : 25.0 pg  Mean Cell Hemoglobin Concentration : 30.2 gm/dL  Auto Neutrophil # : x  Auto Lymphocyte # : x  Auto Monocyte # : x  Auto Eosinophil # : x  Auto Basophil # : x  Auto Neutrophil % : x  Auto Lymphocyte % : x  Auto Monocyte % : x  Auto Eosinophil % : x  Auto Basophil % : x    11-13    135  |  100  |  57<H>  ----------------------------<  167<H>  4.5   |  22  |  3.13<H>  11-12    136  |  101  |  53<H>  ----------------------------<  164<H>  4.3   |  23  |  2.91<H>    Ca    8.9      13 Nov 2023 06:13  Ca    8.5      12 Nov 2023 06:52    TPro  5.6<L>  /  Alb  3.3  /  TBili  0.4  /  DBili  x   /  AST  13  /  ALT  29  /  AlkPhos  90  11-12      TELEMETRY: SR 70s, no NSVT since 11/11 afternoon (7 beats) 	      < from: TTE W or WO Ultrasound Enhancing Agent (11.08.23 @ 07:07) >  TRANSTHORACIC ECHOCARDIOGRAM REPORT  ________________________________________________________________________________                                      _______       Pt. Name:       BRANDEN MARRUFO   Study Date:    11/8/2023  MRN:            [id number]   YOB: 1965  Accession #:    [id number]    Age:           58 years  Account#:       [id number] Gender:        M  Heart Rate:                  Height:        72.00 in (182.88 cm)  Rhythm:                      Weight:        154.00 lb (69.85 kg)  Blood Pressure: 122/80 mmHg  BSA/BMI:       1.91 m² / 20.89 kg/m²  ________________________________________________________________________________________  Referring Physician:    [id number] Kojo Han  Interpreting Physician: Puneet Beltrán M.D.  Primary Sonographer:    Jany Goel RDCS    CPT:                ECHO TTE W CON FU LTD - .m;LIMITED SPECTRAL -                      49926.m;DEFINITY ECHO CONTRAST PER ML WASTED -                      .m;DEFINITY ECHO CONTRASTPER ML - .m  Indication(s):      Heart failure, unspecified - I50.9  Procedure:          Limited transthoracic echocardiogram.  [facility]  Contrast Injection: Verbal consent was obtained for injection of Ultrasonic       Enhancing Agent following a discussion of risks and                      benefits.                      Endocardial visualization enhanced with 2 ml of Definity                      Ultrasound enhancing agent (Lot#:[id number] Discarded Dose:8ml).  UEAReaction:       Patient had no adverse reaction after injection of                      Ultrasound Enhancing Agent.  Study Information:  Image quality for this study is adequate.    _______________________________________________________________________________________     CONCLUSIONS:      1. Left ventricular systolic function is severely decreased with an ejection fraction visually estimated at <20 %. Global left ventricular hypokinesis.   2. Compared to the transthoracic echocardiogram performed on 10/26/2023 the thrombus is much smaller.    ________________________________________________________________________________________  FINDINGS:     Left Ventricle:  Left ventricular systolic function is severely decreased with an ejection fraction visually estimated at <20%. There is global left ventricular hypokinesis. There is a small left ventricular thrombus.  ____________________________________________________________________  Quantitative Data:  Left Ventricle Measurements: (Indexed to BSA)     Visualized LV EF%: <20%       LVOT / RVOT/ Qp/Qs Data: (Indexed to BSA)  LVOT Diameter: 2.30 cm  LVOT Vmax:     0.58 m/s  LVOT VTI:      8.07 cm  LVOT SV:       33.5 ml  17.59 ml/m²    < end of copied text >       24H hour events: No acute events    MEDICATIONS:  aMIOdarone    Tablet   Oral   aMIOdarone    Tablet 400 milliGRAM(s) Oral every 8 hours  aspirin enteric coated 81 milliGRAM(s) Oral daily  hydrALAZINE 75 milliGRAM(s) Oral every 8 hours  isosorbide   dinitrate Tablet (ISORDIL) 30 milliGRAM(s) Oral three times a day  metoprolol succinate ER 25 milliGRAM(s) Oral daily  pantoprazole    Tablet 40 milliGRAM(s) Oral before breakfast  polyethylene glycol 3350 17 Gram(s) Oral daily PRN  senna 2 Tablet(s) Oral at bedtime  atorvastatin 80 milliGRAM(s) Oral at bedtime  dapagliflozin 10 milliGRAM(s) Oral every 24 hours  insulin glargine Injectable (LANTUS) 15 Unit(s) SubCutaneous at bedtime  insulin lispro (ADMELOG) corrective regimen sliding scale   SubCutaneous three times a day before meals  insulin lispro (ADMELOG) corrective regimen sliding scale   SubCutaneous at bedtime  insulin lispro Injectable (ADMELOG) 6 Unit(s) SubCutaneous three times a day before meals  chlorhexidine 2% Cloths 1 Application(s) Topical <User Schedule>  tamsulosin 0.4 milliGRAM(s) Oral at bedtime      REVIEW OF SYSTEMS:  Complete 12 point ROS negative.    PHYSICAL EXAM:  T(C): 36.8 (11-13-23 @ 04:39), Max: 37 (11-12-23 @ 20:30)  HR: 79 (11-13-23 @ 04:39) (51 - 79)  BP: 101/66 (11-13-23 @ 04:39) (95/51 - 106/64)  RR: 18 (11-13-23 @ 04:39) (18 - 18)  SpO2: 98% (11-13-23 @ 04:39) (96% - 98%)  Wt(kg): --  I&O's Summary    12 Nov 2023 07:01  -  13 Nov 2023 07:00  --------------------------------------------------------  IN: 480 mL / OUT: 250 mL / NET: 230 mL        Appearance: Normal	  HEENT:  PERRL, EOMI	  Cardiovascular: Normal S1 S2, No JVD, No murmurs, No edema  Respiratory: Lungs clear to auscultation	  Psychiatry: A & O x 2, Mood & affect appropriate  Gastrointestinal:  Soft, Non-tender, + BS	  Skin: No rashes, No ecchymoses, No cyanosis	  Neurologic: Non-focal  Extremities: No clubbing, cyanosis or edema  Vascular: Peripheral pulses palpable 2+ bilaterally        LABS:	 	    CBC Full  -  ( 11 Nov 2023 16:07 )  WBC Count : 7.57 K/uL  Hemoglobin : 10.7 g/dL  Hematocrit : 35.4 %  Platelet Count - Automated : 279 K/uL  Mean Cell Volume : 82.7 fl  Mean Cell Hemoglobin : 25.0 pg  Mean Cell Hemoglobin Concentration : 30.2 gm/dL  Auto Neutrophil # : x  Auto Lymphocyte # : x  Auto Monocyte # : x  Auto Eosinophil # : x  Auto Basophil # : x  Auto Neutrophil % : x  Auto Lymphocyte % : x  Auto Monocyte % : x  Auto Eosinophil % : x  Auto Basophil % : x    11-13    135  |  100  |  57<H>  ----------------------------<  167<H>  4.5   |  22  |  3.13<H>  11-12    136  |  101  |  53<H>  ----------------------------<  164<H>  4.3   |  23  |  2.91<H>    Ca    8.9      13 Nov 2023 06:13  Ca    8.5      12 Nov 2023 06:52    TPro  5.6<L>  /  Alb  3.3  /  TBili  0.4  /  DBili  x   /  AST  13  /  ALT  29  /  AlkPhos  90  11-12      TELEMETRY: SR 70s, no NSVT since 11/11 afternoon (7 beats) 	      < from: TTE W or WO Ultrasound Enhancing Agent (11.08.23 @ 07:07) >  TRANSTHORACIC ECHOCARDIOGRAM REPORT  ________________________________________________________________________________                                      _______       Pt. Name:       BRANDEN MARRUFO   Study Date:    11/8/2023  MRN:            [id number]   YOB: 1965  Accession #:    [id number]    Age:           58 years  Account#:       [id number] Gender:        M  Heart Rate:                  Height:        72.00 in (182.88 cm)  Rhythm:                      Weight:        154.00 lb (69.85 kg)  Blood Pressure: 122/80 mmHg  BSA/BMI:       1.91 m² / 20.89 kg/m²  ________________________________________________________________________________________  Referring Physician:    [id number] Kojo Han  Interpreting Physician: Puneet Beltrán M.D.  Primary Sonographer:    Jany Goel RDCS    CPT:                ECHO TTE W CON FU LTD - .m;LIMITED SPECTRAL -                      26572.m;DEFINITY ECHO CONTRAST PER ML WASTED -                      .m;DEFINITY ECHO CONTRASTPER ML - .m  Indication(s):      Heart failure, unspecified - I50.9  Procedure:          Limited transthoracic echocardiogram.  [facility]  Contrast Injection: Verbal consent was obtained for injection of Ultrasonic       Enhancing Agent following a discussion of risks and                      benefits.                      Endocardial visualization enhanced with 2 ml of Definity                      Ultrasound enhancing agent (Lot#:[id number] Discarded Dose:8ml).  UEAReaction:       Patient had no adverse reaction after injection of                      Ultrasound Enhancing Agent.  Study Information:  Image quality for this study is adequate.    _______________________________________________________________________________________     CONCLUSIONS:      1. Left ventricular systolic function is severely decreased with an ejection fraction visually estimated at <20 %. Global left ventricular hypokinesis.   2. Compared to the transthoracic echocardiogram performed on 10/26/2023 the thrombus is much smaller.    ________________________________________________________________________________________  FINDINGS:     Left Ventricle:  Left ventricular systolic function is severely decreased with an ejection fraction visually estimated at <20%. There is global left ventricular hypokinesis. There is a small left ventricular thrombus.  ____________________________________________________________________  Quantitative Data:  Left Ventricle Measurements: (Indexed to BSA)     Visualized LV EF%: <20%       LVOT / RVOT/ Qp/Qs Data: (Indexed to BSA)  LVOT Diameter: 2.30 cm  LVOT Vmax:     0.58 m/s  LVOT VTI:      8.07 cm  LVOT SV:       33.5 ml  17.59 ml/m²    < end of copied text >

## 2023-11-13 NOTE — PROGRESS NOTE ADULT - SUBJECTIVE AND OBJECTIVE BOX
Bernarda Reed MD  Division of Hospital Medicine  Please contact via MS Teams (prefer message first)  Office: [phone]    Patient is a 58y old  Male who presents with a chief complaint of NSTEMI (13 Nov 2023 11:31)      SUBJECTIVE / OVERNIGHT EVENTS:  no acute events overnight, vss, afebrile  pt at baseline mental status  has no complaints    ROS:  14 point ROS negative in detail except stated as above    MEDICATIONS  (STANDING):  aMIOdarone    Tablet   Oral   aMIOdarone    Tablet 400 milliGRAM(s) Oral every 8 hours  aspirin enteric coated 81 milliGRAM(s) Oral daily  atorvastatin 80 milliGRAM(s) Oral at bedtime  chlorhexidine 2% Cloths 1 Application(s) Topical <User Schedule>  dapagliflozin 10 milliGRAM(s) Oral every 24 hours  hydrALAZINE 75 milliGRAM(s) Oral every 8 hours  insulin glargine Injectable (LANTUS) 15 Unit(s) SubCutaneous at bedtime  insulin lispro (ADMELOG) corrective regimen sliding scale   SubCutaneous three times a day before meals  insulin lispro (ADMELOG) corrective regimen sliding scale   SubCutaneous at bedtime  insulin lispro Injectable (ADMELOG) 6 Unit(s) SubCutaneous three times a day before meals  isosorbide   dinitrate Tablet (ISORDIL) 30 milliGRAM(s) Oral three times a day  metoprolol succinate ER 25 milliGRAM(s) Oral daily  pantoprazole    Tablet 40 milliGRAM(s) Oral before breakfast  senna 2 Tablet(s) Oral at bedtime  tamsulosin 0.4 milliGRAM(s) Oral at bedtime    MEDICATIONS  (PRN):  polyethylene glycol 3350 17 Gram(s) Oral daily PRN Constipation      CAPILLARY BLOOD GLUCOSE      POCT Blood Glucose.: 138 mg/dL (13 Nov 2023 11:33)  POCT Blood Glucose.: 176 mg/dL (13 Nov 2023 07:49)  POCT Blood Glucose.: 176 mg/dL (13 Nov 2023 04:21)  POCT Blood Glucose.: 212 mg/dL (13 Nov 2023 00:06)  POCT Blood Glucose.: 202 mg/dL (12 Nov 2023 21:07)  POCT Blood Glucose.: 187 mg/dL (12 Nov 2023 17:11)    I&O's Summary    12 Nov 2023 07:01  -  13 Nov 2023 07:00  --------------------------------------------------------  IN: 480 mL / OUT: 250 mL / NET: 230 mL        PHYSICAL EXAM:  Vital Signs Last 24 Hrs  T(C): 36.8 (13 Nov 2023 04:39), Max: 37 (12 Nov 2023 20:30)  T(F): 98.3 (13 Nov 2023 04:39), Max: 98.6 (12 Nov 2023 20:30)  HR: 79 (13 Nov 2023 04:39) (51 - 79)  BP: 101/66 (13 Nov 2023 04:39) (95/51 - 101/66)  BP(mean): --  RR: 18 (13 Nov 2023 04:39) (18 - 18)  SpO2: 98% (13 Nov 2023 04:39) (96% - 98%)    Parameters below as of 13 Nov 2023 04:39  Patient On (Oxygen Delivery Method): nasal cannula  O2 Flow (L/min): 2    GENERAL: NAD, well-developed  HEAD:  Atraumatic, Normocephalic  EYES: EOMI, PERRLA, conjunctiva and sclera clear  NECK: Supple, No JVD  CHEST/LUNG: Clear to auscultation bilaterally; No wheeze  HEART: Regular rate and rhythm; No murmurs, rubs, or gallops  ABDOMEN: Soft, Nontender, Nondistended; Bowel sounds present  EXTREMITIES:  2+ Peripheral Pulses, No clubbing, cyanosis, or edema  NEUROLOGY: AAOx2; non-focal  SKIN: No rashes or lesions    LABS:                        10.7   7.57  )-----------( 279      ( 11 Nov 2023 16:07 )             35.4     11-13    135  |  100  |  57<H>  ----------------------------<  167<H>  4.5   |  22  |  3.13<H>    Ca    8.9      13 Nov 2023 06:13    TPro  5.6<L>  /  Alb  3.3  /  TBili  0.4  /  DBili  x   /  AST  13  /  ALT  29  /  AlkPhos  90  11-12    PT/INR - ( 13 Nov 2023 06:15 )   PT: 15.6 sec;   INR: 1.50 ratio         PTT - ( 13 Nov 2023 06:15 )  PTT:29.1 sec      Urinalysis Basic - ( 13 Nov 2023 06:13 )    Color: x / Appearance: x / SG: x / pH: x  Gluc: 167 mg/dL / Ketone: x  / Bili: x / Urobili: x   Blood: x / Protein: x / Nitrite: x   Leuk Esterase: x / RBC: x / WBC x   Sq Epi: x / Non Sq Epi: x / Bacteria: x        RADIOLOGY & ADDITIONAL TESTS:    Imaging Personally Reviewed:    Consultant(s) Notes Reviewed:  NSG, cards, nephro    Care Discussed with Consultants/Other Providers: Dr. Pickett (nephro)

## 2023-11-13 NOTE — PROGRESS NOTE ADULT - PROBLEM SELECTOR PLAN 1
Scr continue to trend up to 3.1 this morning, baseline 1.28 on Oct 15, 2023  - likely in setting of overdiuresis  - renal US with No hydronephrosis. Increased cortical echogenicity, suggestive of renal parenchymal disease  - appreciate nephro recs: care discussed with Dr. Pickett - agree with holding off angiogram at this time

## 2023-11-13 NOTE — PROGRESS NOTE ADULT - ASSESSMENT
59 yo Male with prior Stroke with mild ?left sided deficits (improved), HTN, DM2, vertigo, HLD, Mobitz II s/pp [employer] dual chamber ICD (12/21/22) initially presented to [facility] from home with complaints of dyspnea and chest pain and was admitted w/ acute hypoxic respiratory failure likely due to acute pulmonary edema due to acute HFrEF in setting of acute NSTEMI, LAURA and enterovirus infection. Pt with brief MICU stay at [facility] requiring bipap (no intubation), was treated for PNA with cefepime, and was found to have TTE done 9/26/23 showing EF 20% with severely decreased LVSF, multiple regional wall motion abnormalities, and elevated LV end diastolic pressure. Pt was transferred to [facility] for cardiac eval.   TTE EF < 20%  RHC 10/19: RA 10, RV 47/9/13, Wedge 29, PA 41/26/33, CO/CI 4.4/2.3, PA sat 57.3  EKG: sinus tachycardia, septal q-waves, left axis deviation   TTE 10/16/23: LV 5.5 cm, LVEF 20% with regional WMAs worse in the apex, LVOT VTI 8 cm, normal RV size/function, severe functional MR, small pericardial effusion, estimated RA pressure 8 mmHg   TTE 12/2022: normal LVEF   LHC: 95% discrete proximal LAD disease and sequential multifocal disease with good distal target, 80-90% proximal LCx disease just prior to marginals, severe multifocal RCA disease with good targets   A1c 8.4    CD 10/17 neg   NIHSS 1  CTH with age indeterminate R cerebellar infarct.  likely chronic   CTA with mod L MCA stenosis, R VA occlusion, severe L VA stenosis   MRi brain with eovlving acute/subacute R PICA infarct with mild edema and mild hemorrhagic transformation. 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  occluded R VA. severe L VA stenosis   CTH stable   RRT/code stroke on 11/6 in setting of SBP 80s/50s   CTH stable. old R cerebellar infarct. CTA with severe L VA stenosis ; R VA occlusion   11/8 RRT transferred to CCU. no neuro changes   MR NOVA: Old right medial cerebellar infarct with hemosiderin staining. Small vessel white matter ischemic changes. Left occipital white matter infarct with diffusion restriction. Scattered foci of hemosiderin deposition in the basal ganglia and left parietal region.Occluded nondominant right vertebral artery. There is stenosis V4 segment dominant left vertebral artery. Significant left M1 stenosis using noninvasive flow MR angiography.         Impression:   R cerebellar/PICA infarct as well as L parietal embolic infarct, likely cardioembolic   worsening symptoms in setting of hypotension,m now improved       - neurosx plans for cerebral angio when Cr allows   - -  patient will be high risk from neuro standpoint for cardiac intervention however benefits at this point seem to outweigh risks.  would consider PCI.  triple therapy will be needed    - keep SBP > 100; becomes symptomatic when BP drops   - c/o SOB ; f/u cardio and pulmo   - f/u heart failure team   - can consider routine EEG but low yield   - CTS eval for CABG given 3 vessel disease vs high risk PCI --> not a CABG candidate ; possible PCI planning but would need "triple therapy"   - c/w asa and statin therpay for secondary stroke prevention.   - no objection to heparin drip for low EF and LV thrombus  --> no objection to transition to coumadin ; hep to coumadin bridge ; coumadin per INR   - called for risk stratification for heart transplant eval,  low-mod risk from stroke standpoint and no objection   - b12, TSH, RPR for reversible causes of dementia   - telemetry  - PT/OT   - check FS, glucose control <180  - GI/DVT ppx   - Thank you for allowing me to participate in the care of this patient. Call with questions.   spoke with primary team     Abelardo Irving MD  Vascular Neurology  Office: [phone]

## 2023-11-13 NOTE — PROGRESS NOTE ADULT - SUBJECTIVE AND OBJECTIVE BOX
Subjective:  - No amanda orthopnea    Medications:  aMIOdarone    Tablet   Oral   aMIOdarone    Tablet 400 milliGRAM(s) Oral every 8 hours  aspirin enteric coated 81 milliGRAM(s) Oral daily  atorvastatin 80 milliGRAM(s) Oral at bedtime  chlorhexidine 2% Cloths 1 Application(s) Topical <User Schedule>  dapagliflozin 10 milliGRAM(s) Oral every 24 hours  hydrALAZINE 75 milliGRAM(s) Oral every 8 hours  insulin glargine Injectable (LANTUS) 15 Unit(s) SubCutaneous at bedtime  insulin lispro (ADMELOG) corrective regimen sliding scale   SubCutaneous three times a day before meals  insulin lispro (ADMELOG) corrective regimen sliding scale   SubCutaneous at bedtime  insulin lispro Injectable (ADMELOG) 6 Unit(s) SubCutaneous three times a day before meals  isosorbide   dinitrate Tablet (ISORDIL) 30 milliGRAM(s) Oral three times a day  metoprolol succinate ER 25 milliGRAM(s) Oral daily  pantoprazole    Tablet 40 milliGRAM(s) Oral before breakfast  polyethylene glycol 3350 17 Gram(s) Oral daily PRN  senna 2 Tablet(s) Oral at bedtime  tamsulosin 0.4 milliGRAM(s) Oral at bedtime  warfarin 5 milliGRAM(s) Oral once    Physical Exam:    Vitals:  Vital Signs Last 24 Hours  T(C): 36.4 (23 @ 16:05), Max: 37.1 (23 @ 12:07)  HR: 74 (23 @ 16:38) (74 - 80)  BP: 100/63 (23 @ 16:38) (93/54 - 107/66)  RR: 18 (23 @ 16:05) (16 - 18)  SpO2: 98% (23 @ 16:05) (95% - 98%)    Weight in k ( @ 08:43)    I&O's Summary    2023 07:01  -  2023 07:00  --------------------------------------------------------  IN: 480 mL / OUT: 250 mL / NET: 230 mL    Tele: SR 70s    General: No distress. Comfortable.  HEENT: EOM intact.  Neck: Neck supple. JVP 14-16 cm H2O  Chest: Clear to auscultation bilaterally  CV: Normal S1 and S2. Radial pulses normal.  Abdomen: Soft, non-distended, non-tender  Extremities: Warm peripherally No edema  Neurology: Alert and oriented times three. Sensation intact  Psych: Affect normal    Labs:        135  |  100  |  57<H>  ----------------------------<  167<H>  4.5   |  22  |  3.13<H>    Ca    8.9      2023 06:13    TPro  5.6<L>  /  Alb  3.3  /  TBili  0.4  /  DBili  x   /  AST  13  /  ALT  29  /  AlkPhos  90  11-12    PT/INR - ( 2023 06:15 )   PT: 15.6 sec;   INR: 1.50 ratio         PTT - ( 2023 06:15 )  PTT:29.1 sec               Subjective:  - No amanda orthopnea, found in bed in NAD after RHC/Cardiomems. RHC showed elevated filling pressures with mean RA of 14, PAWP 25 mmHg with V wave up to 30 mmHg and CI 2.19    Medications:  aMIOdarone    Tablet   Oral   aMIOdarone    Tablet 400 milliGRAM(s) Oral every 8 hours  aspirin enteric coated 81 milliGRAM(s) Oral daily  atorvastatin 80 milliGRAM(s) Oral at bedtime  chlorhexidine 2% Cloths 1 Application(s) Topical <User Schedule>  dapagliflozin 10 milliGRAM(s) Oral every 24 hours  hydrALAZINE 75 milliGRAM(s) Oral every 8 hours  insulin glargine Injectable (LANTUS) 15 Unit(s) SubCutaneous at bedtime  insulin lispro (ADMELOG) corrective regimen sliding scale   SubCutaneous three times a day before meals  insulin lispro (ADMELOG) corrective regimen sliding scale   SubCutaneous at bedtime  insulin lispro Injectable (ADMELOG) 6 Unit(s) SubCutaneous three times a day before meals  isosorbide   dinitrate Tablet (ISORDIL) 30 milliGRAM(s) Oral three times a day  metoprolol succinate ER 25 milliGRAM(s) Oral daily  pantoprazole    Tablet 40 milliGRAM(s) Oral before breakfast  polyethylene glycol 3350 17 Gram(s) Oral daily PRN  senna 2 Tablet(s) Oral at bedtime  tamsulosin 0.4 milliGRAM(s) Oral at bedtime  warfarin 5 milliGRAM(s) Oral once    Physical Exam:    Vitals:  Vital Signs Last 24 Hours  T(C): 36.4 (23 @ 16:05), Max: 37.1 (23 @ 12:07)  HR: 74 (23 @ 16:38) (74 - 80)  BP: 100/63 (23 @ 16:38) (93/54 - 107/66)  RR: 18 (23 @ 16:05) (16 - 18)  SpO2: 98% (23 @ 16:05) (95% - 98%)    Weight in k ( @ 08:43)    I&O's Summary    2023 07:01  -  2023 07:00  --------------------------------------------------------  IN: 480 mL / OUT: 250 mL / NET: 230 mL    Tele: SR 70s    General: No distress. Comfortable.  HEENT: EOM intact.  Neck: Neck supple. JVP 14-16 cm H2O  Chest: Clear to auscultation bilaterally  CV: Normal S1 and S2. Radial pulses normal.  Abdomen: Soft, non-distended, non-tender  Extremities: Warm peripherally No edema  Neurology: Alert and oriented times three. Sensation intact  Psych: Affect normal    Labs:        135  |  100  |  57<H>  ----------------------------<  167<H>  4.5   |  22  |  3.13<H>    Ca    8.9      2023 06:13    TPro  5.6<L>  /  Alb  3.3  /  TBili  0.4  /  DBili  x   /  AST  13  /  ALT  29  /  AlkPhos  90  11-12    PT/INR - ( 2023 06:15 )   PT: 15.6 sec;   INR: 1.50 ratio         PTT - ( 2023 06:15 )  PTT:29.1 sec

## 2023-11-13 NOTE — PROGRESS NOTE ADULT - PROBLEM SELECTOR PLAN 2
- 95% discrete proximal LAD disease and sequential multifocal disease with good distal target, 80-90% proximal LCx disease just prior to marginals, severe multifocal RCA. No viability on MRI.  - There is greater than 50% thickness subendocardial LGE within the mid anteroseptum, anterior and anterolateral segments and apical segments  - Spoke to IC team, pt not a good candidate for palliative PCI as significant LGE and pt would need ECMO for support which is very high risk in pt with recent strokes / LV thrombus / severe cerebrovascular disease  - On ASA and Statin

## 2023-11-13 NOTE — CONSULT NOTE ADULT - CONSULT REQUESTED BY NAME
Dr. Cheri Minor
Primary Team
Dr. Bradshaw/Dr. Sorto
Dr. Han
Dr. Martin
Marialuisa Yung MD
CTS team and Dr. Junior
Dr. Deanne BARRETT

## 2023-11-13 NOTE — PROGRESS NOTE ADULT - ASSESSMENT
57 y/o M PMH CVA with mild left sided deficits, HTN, DM2, vertigo, HLD, Mobitz II s/p Medtronic dual chamber primary prevention ICD (12/21/22) initially presented to OSH for SOB c/f ADHF vs. PNA, later found to have NSTEMI transferred to [facility] for LHC evaluation. 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 Readmitted to CICU 11/8 for sustained MMVT on the floor, ~150bpm falling into monitor zone w/o therapy delivered and spontaneously terminated. He was started on Amiodarone load on 11/8. On 11/8, he continued to have frequent MM nonsustained VT, and also sustained MMVT @ rate ~150bpm w/ spontaneous termination.    1. HFrEF   2. CAD  3. VT    - SR 70-80s, 7 beats NSVT 11/11 afternoon - none since  - Continue Amiodarone load 400 mg Q8H to 10gm (received 5.6 gm as of this AM) followed by 200 mg daily - While on Amio, needs monitoring of LFT's and TFT's. Will need monitoring of PFT's and yearly opthalmologic eval as outpatient as well   - Continue Metoprolol  - HF following, currently holding off on launching VAD/transplant eval given cognitive issues  - HF spoke to IC team, pt not a good candidate for palliative PCI as significant LGE and pt would need ECMO for support which is very high risk in pt with recent strokes / LV thrombus / severe cerebrovascular disease-  - Close telemetry monitoring  - Maintain K>4.0 and Mg>2.0 59 y/o M PMH CVA with mild left sided deficits, HTN, DM2, vertigo, HLD, Mobitz II s/p Medtronic dual chamber primary prevention ICD (12/21/22) initially presented to OSH for SOB c/f ADHF vs. PNA, later found to have NSTEMI transferred to [facility] for LHC evaluation. 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 Readmitted to CICU 11/8 for sustained MMVT on the floor, ~150bpm falling into monitor zone w/o therapy delivered and spontaneously terminated. He was started on Amiodarone load on 11/8. On 11/8, he continued to have frequent MM nonsustained VT, and also sustained MMVT @ rate ~150bpm w/ spontaneous termination.    1. HFrEF   2. CAD  3. VT    - SR 70-80s, 7 beats NSVT 11/11 afternoon - none since  - Continue Amiodarone load 400 mg Q8H to 10gm (received 5.6 gm as of this AM) followed by 200 mg daily - While on Amio, needs monitoring of LFT's and TFT's. Will need monitoring of PFT's and yearly opthalmologic eval as outpatient as well   - Continue Metoprolol  - HF following, currently holding off on launching VAD/transplant eval given cognitive issues  - HF spoke to IC team, pt not a good candidate for palliative PCI as significant LGE and pt would need ECMO for support which is very high risk in pt with recent strokes / LV thrombus / severe cerebrovascular disease-  - Close telemetry monitoring  - Maintain K>4.0 and Mg>2.0 57 y/o M PMH CVA with mild left sided deficits, HTN, DM2, vertigo, HLD, Mobitz II s/p Medtronic dual chamber primary prevention ICD (12/21/22) initially presented to OSH for SOB c/f ADHF vs. PNA, later found to have NSTEMI transferred to [facility] for LHC evaluation. 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 Readmitted to CICU 11/8 for sustained MMVT on the floor, ~150bpm falling into monitor zone w/o therapy delivered and spontaneously terminated. He was started on Amiodarone load on 11/8. On 11/8, he continued to have frequent MM nonsustained VT, and also sustained MMVT @ rate ~150bpm w/ spontaneous termination.    1. HFrEF   2. CAD  3. VT    - SR 70-80s, 7 beats NSVT 11/11 afternoon - none since  - Continue Amiodarone load 400 mg Q8H to 10gm (received 5.6 gm as of this AM) followed by 200 mg daily - While on Amio, needs monitoring of LFT's and TFT's. Will need monitoring of PFT's and yearly opthalmologic eval as outpatient as well   - Continue Metoprolol  - HF following, currently holding off on launching VAD/transplant eval given cognitive issues  - HF spoke to IC team, pt not a good candidate for palliative PCI as significant LGE and pt would need ECMO for support which is very high risk in pt with recent strokes / LV thrombus / severe cerebrovascular disease-  - Close telemetry monitoring  - Maintain K>4.0 and Mg>2.0 57 y/o M PMH CVA with mild left sided deficits, HTN, DM2, vertigo, HLD, Mobitz II s/p Medtronic dual chamber primary prevention ICD (12/21/22) initially presented to OSH for SOB c/f ADHF vs. PNA, later found to have NSTEMI transferred to [facility] for LHC evaluation. 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 Readmitted to CICU 11/8 for sustained MMVT on the floor, ~150bpm falling into monitor zone w/o therapy delivered and spontaneously terminated. He was started on Amiodarone load on 11/8. On 11/8, he continued to have frequent MM nonsustained VT, and also sustained MMVT @ rate ~150bpm w/ spontaneous termination.    1. HFrEF   2. CAD  3. VT    - SR 70-80s  - Continue Amiodarone load 400 mg Q8H to 10gm (received 5.6 gm as of this AM) followed by 200 mg daily - While on Amio, needs monitoring of LFT's and TFT's. Will need monitoring of PFT's and yearly opthalmologic eval as outpatient as well   - Continue Metoprolol  - HF following, currently holding off on launching VAD/transplant eval given cognitive issues  - HF spoke to IC team, pt not a good candidate for palliative PCI as significant LGE and pt would need ECMO for support which is very high risk in pt with recent strokes / LV thrombus / severe cerebrovascular disease-  - Close telemetry monitoring  - Maintain K>4.0 and Mg>2.0  - EP to sign off, reconsult as needed 59 y/o M PMH CVA with mild left sided deficits, HTN, DM2, vertigo, HLD, Mobitz II s/p Medtronic dual chamber primary prevention ICD (12/21/22) initially presented to OSH for SOB c/f ADHF vs. PNA, later found to have NSTEMI transferred to [facility] for LHC evaluation. 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 Readmitted to CICU 11/8 for sustained MMVT on the floor, ~150bpm falling into monitor zone w/o therapy delivered and spontaneously terminated. He was started on Amiodarone load on 11/8. On 11/8, he continued to have frequent MM nonsustained VT, and also sustained MMVT @ rate ~150bpm w/ spontaneous termination.    1. HFrEF   2. CAD  3. VT    - SR 70-80s  - Continue Amiodarone load 400 mg Q8H to 10gm (received 5.6 gm as of this AM) followed by 200 mg daily - While on Amio, needs monitoring of LFT's and TFT's. Will need monitoring of PFT's and yearly opthalmologic eval as outpatient as well   - Continue Metoprolol  - HF following, currently holding off on launching VAD/transplant eval given cognitive issues  - HF spoke to IC team, pt not a good candidate for palliative PCI as significant LGE and pt would need ECMO for support which is very high risk in pt with recent strokes / LV thrombus / severe cerebrovascular disease-  - Close telemetry monitoring  - Maintain K>4.0 and Mg>2.0  - EP to sign off, reconsult as needed 59 y/o M PMH CVA with mild left sided deficits, HTN, DM2, vertigo, HLD, Mobitz II s/p Medtronic dual chamber primary prevention ICD (12/21/22) initially presented to OSH for SOB c/f ADHF vs. PNA, later found to have NSTEMI transferred to [facility] for LHC evaluation. 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 Readmitted to CICU 11/8 for sustained MMVT on the floor, ~150bpm falling into monitor zone w/o therapy delivered and spontaneously terminated. He was started on Amiodarone load on 11/8. On 11/8, he continued to have frequent MM nonsustained VT, and also sustained MMVT @ rate ~150bpm w/ spontaneous termination.    1. HFrEF   2. CAD  3. VT    - SR 70-80s  - Continue Amiodarone load 400 mg Q8H to 10gm (received 5.6 gm as of this AM) followed by 200 mg daily - While on Amio, needs monitoring of LFT's and TFT's. Will need monitoring of PFT's and yearly opthalmologic eval as outpatient as well   - Continue Metoprolol  - HF following, currently holding off on launching VAD/transplant eval given cognitive issues  - HF spoke to IC team, pt not a good candidate for palliative PCI as significant LGE and pt would need ECMO for support which is very high risk in pt with recent strokes / LV thrombus / severe cerebrovascular disease-  - Close telemetry monitoring  - Maintain K>4.0 and Mg>2.0  - EP to sign off, reconsult as needed

## 2023-11-13 NOTE — CONSULT NOTE ADULT - CONSULT REQUESTED DATE/TIME
Bedside report received from Jeanne Del Rosario RN. Infant pink without signs of distress. Care assumed.
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
06-Nov-2023
09-Nov-2023 11:48
13-Nov-2023 11:32
13-Oct-2023 22:22
30-Oct-2023 13:44
10-Nov-2023 12:58
29-Oct-2023 12:32
16-Oct-2023
27-Oct-2023 10:52
17-Oct-2023 15:43

## 2023-11-13 NOTE — PROGRESS NOTE ADULT - NS ATTEST RISK PROBLEM GEN_ALL_CORE FT
#Acute Hypoxemic Respiratory Failure  #Decompensated Systolic Heart Failure  #Pulmonary Edema and Pleural Effusions  #Volume overload  #Hypotension
#Acute Hypoxemic Respiratory Failure  #Decompensated Systolic Heart Failure  #Pulmonary Edema and Pleural Effusions  #Volume overload  #Hypotension
#Acute Hypoxemic Respiratory Failure  #Decompensated Systolic Heart Failure  #Pulmonary Edema and Pleural Effusions  #Hypotension

## 2023-11-13 NOTE — PROGRESS NOTE ADULT - NS ATTEND AMEND GEN_ALL_CORE FT
Patient found in bed in NAD, he is post RHC and Cardiomems implantation. Mean RA is 14 mmHg with PAWP 25 mmHg. CI is 2.19. He is pleasantly confused and doesn't remember he just had the procedure. Creat is 3.13 from 2.9 yesterday.     Start diuresis with Bumex + hypertonic saline  Strict I/Os  Monitor renal function closely   Continue GDMT with hydralazine and ISDN   Continue metoprolol XL 25 mg daily   A/C for LV thrombus   Neurology follow up for possible neuro angiogram, however, the patient is currently in LAURA which would make any procedure requiring contrast very high risk for progression of renal disease/need for HD  Continue PT/ OOB to chair

## 2023-11-13 NOTE — PROGRESS NOTE ADULT - SUBJECTIVE AND OBJECTIVE BOX
Neurology        S: patient seen and examined.  no neuro changes.         Medications: MEDICATIONS  (STANDING):  aMIOdarone    Tablet 400 milliGRAM(s) Oral every 8 hours  aMIOdarone    Tablet   Oral   aspirin enteric coated 81 milliGRAM(s) Oral daily  atorvastatin 80 milliGRAM(s) Oral at bedtime  chlorhexidine 2% Cloths 1 Application(s) Topical <User Schedule>  dapagliflozin 10 milliGRAM(s) Oral every 24 hours  hydrALAZINE 75 milliGRAM(s) Oral every 8 hours  insulin glargine Injectable (LANTUS) 15 Unit(s) SubCutaneous at bedtime  insulin lispro (ADMELOG) corrective regimen sliding scale   SubCutaneous three times a day before meals  insulin lispro (ADMELOG) corrective regimen sliding scale   SubCutaneous at bedtime  insulin lispro Injectable (ADMELOG) 6 Unit(s) SubCutaneous three times a day before meals  isosorbide   dinitrate Tablet (ISORDIL) 30 milliGRAM(s) Oral three times a day  metoprolol succinate ER 25 milliGRAM(s) Oral daily  pantoprazole    Tablet 40 milliGRAM(s) Oral before breakfast  senna 2 Tablet(s) Oral at bedtime  tamsulosin 0.4 milliGRAM(s) Oral at bedtime  warfarin 5 milliGRAM(s) Oral once    MEDICATIONS  (PRN):  polyethylene glycol 3350 17 Gram(s) Oral daily PRN Constipation       Vitals:  Vital Signs Last 24 Hrs  T(C): 36.4 (13 Nov 2023 16:05), Max: 37.1 (13 Nov 2023 12:07)  T(F): 97.5 (13 Nov 2023 16:05), Max: 98.8 (13 Nov 2023 12:07)  HR: 74 (13 Nov 2023 16:38) (74 - 80)  BP: 100/63 (13 Nov 2023 16:38) (93/54 - 107/66)  BP(mean): --  RR: 18 (13 Nov 2023 16:38) (16 - 18)  SpO2: 98% (13 Nov 2023 16:38) (95% - 98%)    Parameters below as of 13 Nov 2023 16:38  Patient On (Oxygen Delivery Method): nasal cannula  O2 Flow (L/min): 2        General Exam:   General Appearance: Appropriately dressed and in no acute distress       Head: Normocephalic, atraumatic and no dysmorphic features  Ear, Nose, and Throat: Moist mucous membranes  CVS: S1S2+  Resp: No SOB, no wheeze or rhonchi  GI: soft NT/ND  Extremities: No edema or cyanosis  Skin: No bruises or rashes     Neurological Exam:  Mental Status: Awake, alert and oriented x 2.  Able to follow simple verbal commands. Able to name and repeat. fluent speech. No obvious aphasia or dysarthria noted. poor insight. not understanding why he is in hospital   Cranial Nerves: PERRL, EOMI, VFFC, sensation V1-V3 intact,  no obvious facial asymmetry, equal elevation of palate, scm/trap 5/5, tongue is midline on protrusion. no obvious papilledema on fundoscopic exam. hearing is grossly intact.   Motor: Normal bulk, tone and strength throughout. Fine finger movements were intact and symmetric. no tremors or drift noted.    Sensation: Intact to light touch and pinprick throughout. no right/left confusion. no extinction to tactile on DSS. Romberg was negative.   Reflexes: 1+ throughout at biceps, brachioradialis, triceps, patellars and ankles bilaterally and equal. No clonus. R toe and L toe were both downgoing.  Coordination: No dysmetria on FNF or HKS  Gait:   no limitations in gait.     Data/Labs/Imaging which I personally reviewed.          LABS:      11-13    135  |  100  |  57<H>  ----------------------------<  167<H>  4.5   |  22  |  3.13<H>    Ca    8.9      13 Nov 2023 06:13    TPro  5.6<L>  /  Alb  3.3  /  TBili  0.4  /  DBili  x   /  AST  13  /  ALT  29  /  AlkPhos  90  11-12    LIVER FUNCTIONS - ( 12 Nov 2023 06:52 )  Alb: 3.3 g/dL / Pro: 5.6 g/dL / ALK PHOS: 90 U/L / ALT: 29 U/L / AST: 13 U/L / GGT: x           PT/INR - ( 13 Nov 2023 06:15 )   PT: 15.6 sec;   INR: 1.50 ratio         PTT - ( 13 Nov 2023 06:15 )  PTT:29.1 sec  Urinalysis Basic - ( 13 Nov 2023 06:13 )    Color: x / Appearance: x / SG: x / pH: x  Gluc: 167 mg/dL / Ketone: x  / Bili: x / Urobili: x   Blood: x / Protein: x / Nitrite: x   Leuk Esterase: x / RBC: x / WBC x   Sq Epi: x / Non Sq Epi: x / Bacteria: x       < from: CT Head No Cont (10.27.23 @ 18:04) >    ACC: [id number] EXAM:  CT BRAIN   ORDERED BY: BRETT HOPSON     PROCEDURE DATE:  10/27/2023          INTERPRETATION:  Clinical Indication: CVA    5mm axial sections of the brain were obtained from base to vertex,   without the intravenous administration of contrast material. Coronal and   sagittal computer generated reconstructed views are available.    No prior brain imaging is available for comparison.          The ventricles and sulci are prominent consistent with mild atrophy.   Small vesselwhite matter ischemic changes are noted. There is a infarct   in the right medial cerebellum of undetermined age which could be acute   to subacute based on its degree of lucency. There is no significant   hemorrhage. Bone window examination is unremarkable. There is been   previous right-sided lens replacement surgery.      IMPRESSION: Atrophy and small vessel white matter ischemic changes. Right   medial cerebellar infarct may be acute versus subacute. No hemorrhage.      --- End of Report ---    < from: MR Angio Head No Cont (10.30.23 @ 20:58) >    ACC: [id number] EXAM:  MR ANGIO BRAIN   ORDERED BY: NATALIE CARRANZA     ACC: [id number] EXAM:  MR ANGIO NECK   ORDERED BY: NATALIE CARRANZA     ACC: [id number] EXAM:  MR BRAIN   ORDERED BY: NAVNEET CORONADO     PROCEDURE DATE:  10/30/2023          INTERPRETATION:  .    CLINICAL INFORMATION: Stroke.    TECHNIQUE: Multiplanar multi sequential MRI examination of the brain was   performed without the administration of IV gadolinium. MRA images through   the neck and [place] were obtained usinga combination of 2-D   and 3-D time-of-flight acquisition. The data was then reformatted into a   volumetric data set and reviewed as rotational MIP images.    COMPARISON: Most recent prior CT examination of the head from 10/27/2023.   No prior MRI studies are available for comparison.    FINDINGS:    MRI Brain: A wedge-shaped area of diffusion restriction is seen within   the right PICA territory. Associated T2/FLAIR hyperintense signal is also   seen, compatible with cytotoxic edema. Mild hemorrhagic transformation is   seen within the infarct bed manifested by high signal on T1-weighted   imaging as well as susceptibility artifact on the SWI sequence. Mild mass   effect is seen without effacement of the fourth ventricle or shift of the   posterior fossa midline structures.    This is superimposed upon encephalomalacia and gliosis involving the   right cerebellar hemisphere.    An additional vague area of diffusion reduction is seen within the left   parietal periventricular white matter without associated T2/FLAIR   hyperintense signal, compatible with cytotoxic edema. No gross   hemorrhagic transformation is noted in this location.    Scattered foci of susceptibility artifact are seen within the bilateral   basal ganglia, compatible with areas of chronic microhemorrhage.    Multiple additional patchy confluent nonspecific T2/FLAIR hyperintense   signal changes are noted throughout the bihemispheric white matter   without associated mass effect or restricted diffusion.    Ventricular size and configuration is unremarkable. No abnormal   extra-axial fluid collections are seen. Flow-voids are noted throughout   the major intracranial vessels, on the T2 weighted images, consistent   with their patency. The sellar location appears unremarkable. There is an   unchanged appearing small retrocerebellar arachnoid cyst versus cisterna   magna.    A polyp versus retention cyst is seen within the right maxillary sinus.   Additional minor scattered mucosal thickening seen throughout the ethmoid   labyrinth. The tympanomastoid cavities are clear. The left orbit appears   within normal limits. There is evidence of right-sided cataract removal.    There is an indeterminate mixed signal ovoid shaped soft tissue focus   involving the left superior pinna measuring 1.4 x 0.9 cm which appears   unchanged.    MRA Neck: Examination is motion limited.    There is a standard anatomic variation to the aortic arch. The origins of   the great vessels appear grossly unremarkable.    The bilateral common carotid arteries, carotid bifurcations and cervical   internal carotid arteries appear patent. The origin of the left vertebral   artery is normal. The left vertebral artery is patent.    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    MRA [place]: Atherosclerosis affects the bilateral carotid   siphons with mild area of focal stenosis involving the right   clinoid/supraclinoid junction. There is mild hypoplasia of the right A1   segment. Otherwise, the bilateral intracranial internal carotid,   anterior, and middle cerebral arteries appear unremarkable.    The anterior and bilateral posterior communicating arteries are seen.    The right V4 segment does not demonstrate flow relatedsignal. The left   V4 segment appears unremarkable. The vertebrobasilar confluence appears   unremarkable. Long segment mild stenosis involving the mid basilar   artery. The basilar tip appears unremarkable as well as the bilateral   posterior cerebral arteries.    IMPRESSION:    MRI BRAIN:  1. Evolving acute/subacute right-sided PICA distribution infarction with   associated cytotoxic edema and very mild hemorrhagic transformation.  2. Additional wedge-shaped area of acute/subacute ischemia within the   left parietal periatrial white matter with associated cytotoxic edema.  3. Indeterminate soft tissue lesion involving the left superior pinna   measuring 1.4 x 0.9 cm. Neoplasm cannot be excluded. Recommend   correlation with direct physical examination and visualization.    MRA NECK:  1. Extremely motion limited study.  2. 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 Recommend further evaluation with a CT angiogram study   of the neck.    MRA HEAD:  1. 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 This can be further evaluated with a CT angiogram study of the   head.  2. Mild focal stenosis of the right supraclinoid intracranial internal   carotid artery secondary to atherosclerosis.  3. Otherwise, no large vessel occlusion or major stenosis.    --- End of Report ---      < from: CT Brain Stroke Protocol (11.06.23 @ 14:11) >    ACC: [id number] EXAM:  CT BRAIN STROKE PROTOCOL   ORDERED BY:  KAMILA TAVAREZ     ACC: [id number] EXAM:  CT ANGIO NECK STROKE PROTCL IC   ORDERED BY:  KAMILA TAVAREZ     ACC: [id number] EXAM:  CT ANGIO BRAIN STROKE PROTC IC   ORDERED BY:  KAMILA TAVAREZ     PROCEDURE DATE:  11/06/2023          INTERPRETATION:  Clinical Indication: Code stroke for dizziness, prior   infarct one week ago    5mm axial sections of the brain were obtained from base to vertex,   without the intravenous administration of contrast material. Coronal and   sagittal computer generated reconstructed views are available.    Comparison is made with the prior CT of 10/27/2023 and the MRI 10/30/2023.    There is moderate atrophy for the patient's age with ventricular and   sulcal prominence. Small vessel white matter ischemic changes are noted.   There is an old right medial occipital infarct. No acute hemorrhage is   identified.        After the intravenous power injection of 70 cc of Omnipaque 300 using a   bolus amol timing run serial thin sections were obtained through the   neck from the thoracic inlet through the intracranial circulation   centered at the [id number] on a multislice CT scanner reformatted   with coronal and sagittal 2 D-MIP projections, including 3 D  reconstructions using a separate 3D [employer] software workstation.A total   of  70 cc of Omnipaque were intravenously injected.  30 cc were discarded.    The origins of the common carotid arteries are normal. The origin of the   left dominant left vertebral artery demonstrates moderate stenosis   nondominant small right vertebral artery appears hypoplastic on a   congenital basis and is occluded at the V3 and V4 portion.    There is severe stenosis of the V4 segment of the left vertebral artery   just proximal to the VV junction. The basilar artery is normal. The   posterior cerebral and superior cerebellar arteries are normal.    Evaluation of the carotid arteries demonstrate normal appearance to   distal cervical, petrous, cavernous and supraclinoid internal carotid   arteries. The anterior cerebral arteries and anterior communicating   artery are visualized, the right A1 segment is hypoplastic on a   congenital basis. The middle cerebral arteries are visualized, the left   M1 segment is moderately narrowed but patent. The middle cerebral artery   vessels in the sylvian fissure unremarkable.      The normal intracranial venous circulation is identified. The right   transverse sinus is dominant. The superior sagittal sinus, internal   cerebral veins, vein of Jeremías, straight sinus, transverse sinuses,   sigmoid sinuses and internal jugular veins are normal.  Cortical veins are normal.      There are large bilateral pleural effusions and consolidation in the left   upper lobe whichis similar to a prior exam of 11/5/2023. There is a   left-sided permanent pacemaker.    IMPRESSION: Atrophy for the patient's age. Old right cerebellar infarct.   No hemorrhage. No change since 10/30/2023.    Severe stenosis of the dominant left vertebral artery at the V4 segment   and moderate stenosis at the origin. 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 Moderate left M1 stenosis.    --- End of Report ---   < from: MR Angio Neck No Cont (11.10.23 @ 15:32) >    ACC: [id number] EXAM:  MR ANGIO NECK   ORDERED BY: HERSON ANNE     ACC: [id number] EXAM:  MR BRAIN   ORDERED BY: NATALIE CARRANZA     ACC: [id number] EXAM:  MR ANGIO BRAIN   ORDERED BY: HERSON ANNE     PROCEDURE DATE:  11/10/2023          INTERPRETATION:  CLINICAL INDICATION: CT demonstrating severe stenosis of   the dominant left vertebral artery at the V4 segment and moderate   stenosis at the origin. Occlusion of the nondominant right vertebral   artery at the V3 -4 segment. Moderate left I1omdottzi.        Magnetic resonance imaging of the brain was carried out with transaxial   SPGR, FLAIR, fast spin echo T2 weighted images, axial susceptibility   weighted series, diffusion weighted series and sagittal T1 weighted   series on a 1.5 Crystal magnet.    Comparison is made with the prior CT/CTA 11/6/2023.      There is moderate atrophy for the patient's age. An old right medial   cerebellar infarct with hemosiderin staining is identified. Small vessel   white matter ischemic changes arenoted. There is a patchy area of   diffusion restriction in the left subdural white matter. Scattered foci   of hemosiderin deposition are identified in the basal ganglia bilaterally   and left parietal region as well as the old right cerebellar infarct.    A 2 D and 3-D axial noncontrast MRA were performed on the cervical and   intracranial vessels, respectively. Intravascular flow quantification was   performed using gated 2D phase contrast MR, imaged perpendicular to the   vessel axis.  Imageswere post processed NOVA software and a NOVA flow   study report is available      There is occlusion of the distal right nondominant vertebral artery.   There is severe stenosis of the V4 segment of the dominant left vertebral   artery. There is moderate left M1 stenosis.    Flow is as follows in ml per minute.    [id number], OMER 152, RMCA 180, RACA 19, RACA2 107    LCCA 276, LICA 193, LMCA 58, LACA 125, LACA2 68    RVA 8, LVA 93, BA 99, RPCA 32, LPCA 61    IMPRESSION: Old right medial cerebellar infarct with hemosiderin   staining. Small vessel white matter ischemic changes. Left occipital   white matter infarct with diffusion restriction. Scattered foci of   hemosiderin deposition in the basal ganglia and left parietal region.    Occluded nondominant right vertebral artery. There is stenosis V4 segment   dominant left vertebral artery. Significant left M1 stenosis using   noninvasive flow MR angiography.    --- End of Report ---            ANDREW TONEY MD; Attending Radiologist  This document has been electronically signed. Nov 10 2023  4:04PM    < end of copied text >

## 2023-11-13 NOTE — PROGRESS NOTE ADULT - PROBLEM SELECTOR PLAN 3
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 Status post [facility]-> 95% discrete proximal LAD disease and sequential multifocal disease with good distal target, 80-90% proximal LCx disease, severe multifocal RCA. Limited viability on MRI. Echocardiogram with LVEF <20%, rWMA & LV thrombus  - HF team & EP card f/u plans appreciated, continue with GDMT (Metoprolol ER 25mg/d, ARB/ARNI- none due to LAURA, No MRA, eventual SGLT2i)  - Hydral 75mg tid and ISDN 30mg TID  - Inotropes: Off milrinone since 11/1  - Advanced therapies- holding off on launching VAD/transplant eval given cognitive issues   - No longer a PCI candidate due to contraindications for ECMO, which likely would be required as per EP note. Will reassess following completion of amiodarone load.    - daily weight and I/O  - Pt euvolemic with Cr. Uptrending. Will hold further diuresis for now. He was receiving bumex 2mg IV bid  Creatinine Trend: 2.91<--, 2.63<--, 2.38<--, 2.12<--, 2.21<--, 2.19<--

## 2023-11-13 NOTE — CONSULT NOTE ADULT - ASSESSMENT
57 yo Male pmhx CVA with mild left sided deficits, HTN, DM2, vertigo, HLD, Mobitz II s/pp Medtronic dual chamber ICD (12/21/22) initially presented to [facility] from home with complaints of dyspnea and chest pain and was admitted w/ acute hypoxic respiratory failure likely due to acute pulmonary edema due to acute HFrEF in setting of acute NSTEMI, LAURA and enterovirus infection. Pt with brief MICU stay at [facility] requiring bipap (no intubation), was treated for PNA with cefepime, and was found to have TTE done 9/26/23 showing EF 20% with severely decreased LVSF, multiple regional wall motion abnormalities, and elevated LV end diastolic pressure. Pt was transferred to [facility] for cardiac evaluation. LHC performed which revealed severe 3v CAD with critical proximal LAD involvement. CTS was consulted for CA Found to have LV thrombus. AMS prompting CT head with angio on 11/6 revealing R medial cerebellar infarct. MRI showing evolving PICA stroke and severe stenosis of L vertebral artery and occlusion o nondominant R vertebral artery.R cerebellar/PICA infarct as well as L parietal embolic infarct, likely cardioembolic neurosx plans for cerebral angio.      1- LAURA   2- CVA  3- cardiomyopathy  4- NSTEMI/3 vessel disease  5- CHF   6- htn     laura in setting of diuretic use likely which he requires.   given rising creatinine hold off on neuro angio given higher risk of laura/contrast nephropathy unless urgently/emergently requiring neuro angio.  diurese as needed to achieve chf compensation   hydralazine 75 mg tid for htn and afterload reduction   strict I/O  re evaluate again in am   d/w NS and primary team as well when seen

## 2023-11-13 NOTE — PROGRESS NOTE ADULT - ASSESSMENT
58M h/o prior CVA (2012) with mild L sided weakness, Mobitz II s/p Medtronic dual chamber ICD (12/21/22) DM2, HTN, HLD, admitted in CCU as a transfer from [facility] for NSTEMI on 10/13/23. His hospital course has been complicated by acute systolic dysfunction, LV thrombus, and possible acute stroke, s/p [facility] with triple vessel disease and MRI brain with significant stenosis, initially scheduled for angiogram but now with LAURA

## 2023-11-13 NOTE — PROGRESS NOTE ADULT - ASSESSMENT
Exam stable, very mild left facial o/w intact, AOx2, no drift GALLEGOS 5/5, SILT.       Cr up to 3.13 from 2.0 on 11/4.   -Will defer angio for now, revisit when renally optimized and creatinine downtrending.   -Agree with rehydration per Nephrology (Dr. Pickett) and CHF as tolerated.

## 2023-11-13 NOTE — PROGRESS NOTE ADULT - ASSESSMENT
58M DM2, HTN, HLD, prior CVA (2012) with mild L sided weakness presenting as a transfer from [facility] for NSTEMI on 10/13/23. His hospital course has been complicated by acute systolic dysfunction, LV thrombus, and possible acute stroke.  - He has had a LHC with triple vessel disease and a RHC with elevated filling pressures    #Acute Hypoxemic Respiratory Failure  - Continue supplemental O2 to maintain O2 sat > 90% - taper down FiO2 as able. Was on 2L this AM.  - Incentive spirometer and acapella to facilitate breathing   - Hypoxemia most likely related to his cardiovascular disease and pleural effusions  - Would recommend ABG if any change/worsening of his respiratory status or mental status to evaluate for hypercapnia    #Pleural Effusion and Pulmonary Edema  - Continued attempts at volume removal with close monitoring of I/O and daily weights. Needs more effective volume removal I think as only 100 cc of output are recorded over prior 24 hours if accurate  - From a pulmonary standpoint patient requires continued attempts at volume removal/diuresis  - No role for thoracentesis at this time as patient is breathing comfortably and has bilateral effusions in the setting of known cardiovascular disease  - He would have to be off AC if thoracentesis is needed in the future - currently on AC for LV thrombus with Heparin drip and Warfarin    #6 mm pulmonary nodule  - Attention to follow-up on repeat imaging in 3-6 months  - Patient does not have any history of smoking, fevers/night sweats, or reported B-symptoms    #Consolidations  - Patient reportedly treated for pneumonia at [facility] - though unclear duration of antibiotics  - Follow-up results of cultures sent initially - procalcitonin not elevated and sputum culture negative so now being monitored off antibiotics    #Cardiovascular disease  - Continue management as per cardiology - currently on diuresis as per Cardiology/HF  - From a pulmonary standpoint he needs continued diuresis - his most recent RHC from 10/30 notes continued elevation in PCWP.   - Continue AC for LV thrombus as per Cardiology  - Patient not a candidate for CABG - has triple vessel disease - remains on ASA and statin    #Prophylaxis  - DVT proph  - GI proph  - Aspiration precautions - this will be particularly important given his lethargy/forgetfulness at times  - Incentive spirometer and acapella    Patient's prognosis is guarded.  Please do not hesitate to contact the inpatient pulmonary consult team for any questions or concerns.  57 yo M DM2, HTN, HLD, prior CVA (2012) with mild L sided weakness presenting as a transfer from [facility] for NSTEMI on 10/13/23. His hospital course has been complicated by acute systolic dysfunction (EF 20%), LV thrombus, and possible acute stroke. He has had a LHC with triple vessel disease and a RHC with elevated filling pressures.    #Acute Hypoxemic Respiratory Failure  - Continue supplemental O2 to maintain O2 sat > 90% - taper down FiO2 as able. Was on 1L this AM.  - Incentive spirometer and acapella to facilitate breathing   - Hypoxemia most likely related to pulm edema in setting of HF  - goal euvolemia, diuretics currently on hold in setting of rising Cr  - Would recommend ABG if any change/worsening of his respiratory status or mental status to evaluate for hypercapnia  - No role for thoracentesis at this time as patient is breathing comfortably and has bilateral effusions in the setting of known cardiovascular disease  - He would have to be off AC if thoracentesis is needed in the future    #LAURA  - Nephrology following, cardiorenal syndrome?  - strict Is/Os, daily weights, goal euvolemia    #Pulmonary nodule, 6mm  - Attention to follow-up on repeat imaging in 3-6 months  - Patient does not have any history of smoking, fevers/night sweats, or reported B-symptoms    #LV thrombus  - Continue AC for LV thrombus as per Cardiology  - Patient not a candidate for CABG - has triple vessel disease - remains on ASA and statin, GDMT as per Cardiology    Pulmonary will sign off.  Please do not hesitate to call with any questions or concerns.    Zoey Polk MD

## 2023-11-13 NOTE — CONSULT NOTE ADULT - CONSULT REASON
HFrEF  preop CABG
Psychological assessment to evaluate mood/cognition
CAD
Atrial tach
GOC
[place] requiring neuro angio
stroke hx
new HFrEF
[facility]
Hypoxia and Pulmonary Evaluation

## 2023-11-13 NOTE — CONSULT NOTE ADULT - PROVIDER SPECIALTY LIST ADULT
Neurology
Cardiology
Palliative Care
Electrophysiology
Psychology
Neurosurgery
Pulmonology
Nephrology
CT Surgery
Heart Failure

## 2023-11-13 NOTE — PROGRESS NOTE ADULT - SUBJECTIVE AND OBJECTIVE BOX
Bernarda Reed MD  Division of Hospital Medicine  Please contact via MS Teams (prefer message first)  Office: [phone]    Patient is a 58y old  Male who presents with a chief complaint of NSTEMI (13 Nov 2023 11:31)      SUBJECTIVE / OVERNIGHT EVENTS:  no acute events overnight, vss, afebrile  pt at baseline mental status  has no complaints    ROS:  14 point ROS negative in detail except stated as above    MEDICATIONS  (STANDING):  aMIOdarone    Tablet   Oral   aMIOdarone    Tablet 400 milliGRAM(s) Oral every 8 hours  aspirin enteric coated 81 milliGRAM(s) Oral daily  atorvastatin 80 milliGRAM(s) Oral at bedtime  chlorhexidine 2% Cloths 1 Application(s) Topical <User Schedule>  dapagliflozin 10 milliGRAM(s) Oral every 24 hours  hydrALAZINE 75 milliGRAM(s) Oral every 8 hours  insulin glargine Injectable (LANTUS) 15 Unit(s) SubCutaneous at bedtime  insulin lispro (ADMELOG) corrective regimen sliding scale   SubCutaneous three times a day before meals  insulin lispro (ADMELOG) corrective regimen sliding scale   SubCutaneous at bedtime  insulin lispro Injectable (ADMELOG) 6 Unit(s) SubCutaneous three times a day before meals  isosorbide   dinitrate Tablet (ISORDIL) 30 milliGRAM(s) Oral three times a day  metoprolol succinate ER 25 milliGRAM(s) Oral daily  pantoprazole    Tablet 40 milliGRAM(s) Oral before breakfast  senna 2 Tablet(s) Oral at bedtime  tamsulosin 0.4 milliGRAM(s) Oral at bedtime    MEDICATIONS  (PRN):  polyethylene glycol 3350 17 Gram(s) Oral daily PRN Constipation      CAPILLARY BLOOD GLUCOSE      POCT Blood Glucose.: 138 mg/dL (13 Nov 2023 11:33)  POCT Blood Glucose.: 176 mg/dL (13 Nov 2023 07:49)  POCT Blood Glucose.: 176 mg/dL (13 Nov 2023 04:21)  POCT Blood Glucose.: 212 mg/dL (13 Nov 2023 00:06)  POCT Blood Glucose.: 202 mg/dL (12 Nov 2023 21:07)  POCT Blood Glucose.: 187 mg/dL (12 Nov 2023 17:11)    I&O's Summary    12 Nov 2023 07:01  -  13 Nov 2023 07:00  --------------------------------------------------------  IN: 480 mL / OUT: 250 mL / NET: 230 mL        PHYSICAL EXAM:  Vital Signs Last 24 Hrs  T(C): 36.8 (13 Nov 2023 04:39), Max: 37 (12 Nov 2023 20:30)  T(F): 98.3 (13 Nov 2023 04:39), Max: 98.6 (12 Nov 2023 20:30)  HR: 79 (13 Nov 2023 04:39) (51 - 79)  BP: 101/66 (13 Nov 2023 04:39) (95/51 - 101/66)  BP(mean): --  RR: 18 (13 Nov 2023 04:39) (18 - 18)  SpO2: 98% (13 Nov 2023 04:39) (96% - 98%)    Parameters below as of 13 Nov 2023 04:39  Patient On (Oxygen Delivery Method): nasal cannula  O2 Flow (L/min): 2    GENERAL: NAD, well-developed  HEAD:  Atraumatic, Normocephalic  EYES: EOMI, PERRLA, conjunctiva and sclera clear  NECK: Supple, No JVD  CHEST/LUNG: Clear to auscultation bilaterally; No wheeze  HEART: Regular rate and rhythm; No murmurs, rubs, or gallops  ABDOMEN: Soft, Nontender, Nondistended; Bowel sounds present  EXTREMITIES:  2+ Peripheral Pulses, No clubbing, cyanosis, or edema  NEUROLOGY: AAOx2; non-focal  SKIN: No rashes or lesions    LABS:                        10.7   7.57  )-----------( 279      ( 11 Nov 2023 16:07 )             35.4     11-13    135  |  100  |  57<H>  ----------------------------<  167<H>  4.5   |  22  |  3.13<H>    Ca    8.9      13 Nov 2023 06:13    TPro  5.6<L>  /  Alb  3.3  /  TBili  0.4  /  DBili  x   /  AST  13  /  ALT  29  /  AlkPhos  90  11-12    PT/INR - ( 13 Nov 2023 06:15 )   PT: 15.6 sec;   INR: 1.50 ratio         PTT - ( 13 Nov 2023 06:15 )  PTT:29.1 sec      Urinalysis Basic - ( 13 Nov 2023 06:13 )    Color: x / Appearance: x / SG: x / pH: x  Gluc: 167 mg/dL / Ketone: x  / Bili: x / Urobili: x   Blood: x / Protein: x / Nitrite: x   Leuk Esterase: x / RBC: x / WBC x   Sq Epi: x / Non Sq Epi: x / Bacteria: x        RADIOLOGY & ADDITIONAL TESTS:    Imaging Personally Reviewed:         Patient is a 58y old  Male who presents with a chief complaint of NSTEMI (13 Nov 2023 11:31)      SUBJECTIVE / OVERNIGHT EVENTS:  No acute events overnight, vss, afebrile. No new complaints, reports doing well.  No SOB when up from bed ambulating to bathroom.  Currently on 1L NC.      ROS:  14 point ROS negative in detail except stated as above    MEDICATIONS  (STANDING):  aMIOdarone    Tablet   Oral   aMIOdarone    Tablet 400 milliGRAM(s) Oral every 8 hours  aspirin enteric coated 81 milliGRAM(s) Oral daily  atorvastatin 80 milliGRAM(s) Oral at bedtime  chlorhexidine 2% Cloths 1 Application(s) Topical <User Schedule>  dapagliflozin 10 milliGRAM(s) Oral every 24 hours  hydrALAZINE 75 milliGRAM(s) Oral every 8 hours  insulin glargine Injectable (LANTUS) 15 Unit(s) SubCutaneous at bedtime  insulin lispro (ADMELOG) corrective regimen sliding scale   SubCutaneous three times a day before meals  insulin lispro (ADMELOG) corrective regimen sliding scale   SubCutaneous at bedtime  insulin lispro Injectable (ADMELOG) 6 Unit(s) SubCutaneous three times a day before meals  isosorbide   dinitrate Tablet (ISORDIL) 30 milliGRAM(s) Oral three times a day  metoprolol succinate ER 25 milliGRAM(s) Oral daily  pantoprazole    Tablet 40 milliGRAM(s) Oral before breakfast  senna 2 Tablet(s) Oral at bedtime  tamsulosin 0.4 milliGRAM(s) Oral at bedtime    MEDICATIONS  (PRN):  polyethylene glycol 3350 17 Gram(s) Oral daily PRN Constipation      CAPILLARY BLOOD GLUCOSE      POCT Blood Glucose.: 138 mg/dL (13 Nov 2023 11:33)  POCT Blood Glucose.: 176 mg/dL (13 Nov 2023 07:49)  POCT Blood Glucose.: 176 mg/dL (13 Nov 2023 04:21)  POCT Blood Glucose.: 212 mg/dL (13 Nov 2023 00:06)  POCT Blood Glucose.: 202 mg/dL (12 Nov 2023 21:07)  POCT Blood Glucose.: 187 mg/dL (12 Nov 2023 17:11)    I&O's Summary    12 Nov 2023 07:01  -  13 Nov 2023 07:00  --------------------------------------------------------  IN: 480 mL / OUT: 250 mL / NET: 230 mL        PHYSICAL EXAM:  Vital Signs Last 24 Hrs  T(C): 36.8 (13 Nov 2023 04:39), Max: 37 (12 Nov 2023 20:30)  T(F): 98.3 (13 Nov 2023 04:39), Max: 98.6 (12 Nov 2023 20:30)  HR: 79 (13 Nov 2023 04:39) (51 - 79)  BP: 101/66 (13 Nov 2023 04:39) (95/51 - 101/66)  BP(mean): --  RR: 18 (13 Nov 2023 04:39) (18 - 18)  SpO2: 98% (13 Nov 2023 04:39) (96% - 98%)    Parameters below as of 13 Nov 2023 04:39  Patient On (Oxygen Delivery Method): nasal cannula  O2 Flow (L/min): 2    GENERAL: NAD, well-developed  HEAD:  Atraumatic, Normocephalic  EYES: EOMI, PERRLA, conjunctiva and sclera clear  NECK: Supple, No JVD  CHEST/LUNG: Clear to auscultation bilaterally; No wheeze  HEART: Regular rate and rhythm; No murmurs, rubs, or gallops  ABDOMEN: Soft, Nontender, Nondistended; Bowel sounds present  EXTREMITIES:  2+ Peripheral Pulses, No clubbing, cyanosis, or edema  NEUROLOGY: AAOx2; non-focal  SKIN: No rashes or lesions    LABS:                        10.7   7.57  )-----------( 279      ( 11 Nov 2023 16:07 )             35.4     11-13    135  |  100  |  57<H>  ----------------------------<  167<H>  4.5   |  22  |  3.13<H>    Ca    8.9      13 Nov 2023 06:13    TPro  5.6<L>  /  Alb  3.3  /  TBili  0.4  /  DBili  x   /  AST  13  /  ALT  29  /  AlkPhos  90  11-12    PT/INR - ( 13 Nov 2023 06:15 )   PT: 15.6 sec;   INR: 1.50 ratio         PTT - ( 13 Nov 2023 06:15 )  PTT:29.1 sec      Urinalysis Basic - ( 13 Nov 2023 06:13 )    Color: x / Appearance: x / SG: x / pH: x  Gluc: 167 mg/dL / Ketone: x  / Bili: x / Urobili: x   Blood: x / Protein: x / Nitrite: x   Leuk Esterase: x / RBC: x / WBC x   Sq Epi: x / Non Sq Epi: x / Bacteria: x        RADIOLOGY & ADDITIONAL TESTS:    Imaging Personally Reviewed:    Echo (11/8/23)  CONCLUSIONS:      1. Left ventricular systolic function is severely decreased with an ejection fraction visually estimated at <20 %. Global left ventricular hypokinesis.   2. Compared to the transthoracic echocardiogram performed on 10/26/2023 the thrombus is much smaller.

## 2023-11-14 DIAGNOSIS — I65.09 OCCLUSION AND STENOSIS OF UNSPECIFIED VERTEBRAL ARTERY: ICD-10-CM

## 2023-11-14 LAB
ANION GAP SERPL CALC-SCNC: 15 MMOL/L — SIGNIFICANT CHANGE UP (ref 5–17)
ANION GAP SERPL CALC-SCNC: 15 MMOL/L — SIGNIFICANT CHANGE UP (ref 5–17)
BUN SERPL-MCNC: 60 MG/DL — HIGH (ref 7–23)
BUN SERPL-MCNC: 60 MG/DL — HIGH (ref 7–23)
CALCIUM SERPL-MCNC: 8.8 MG/DL — SIGNIFICANT CHANGE UP (ref 8.4–10.5)
CALCIUM SERPL-MCNC: 8.8 MG/DL — SIGNIFICANT CHANGE UP (ref 8.4–10.5)
CHLORIDE SERPL-SCNC: 103 MMOL/L — SIGNIFICANT CHANGE UP (ref 96–108)
CHLORIDE SERPL-SCNC: 103 MMOL/L — SIGNIFICANT CHANGE UP (ref 96–108)
CO2 SERPL-SCNC: 20 MMOL/L — LOW (ref 22–31)
CO2 SERPL-SCNC: 20 MMOL/L — LOW (ref 22–31)
CREAT SERPL-MCNC: 3.12 MG/DL — HIGH (ref 0.5–1.3)
CREAT SERPL-MCNC: 3.12 MG/DL — HIGH (ref 0.5–1.3)
EGFR: 23 ML/MIN/1.73M2 — LOW
EGFR: 23 ML/MIN/1.73M2 — LOW
GLUCOSE BLDC GLUCOMTR-MCNC: 124 MG/DL — HIGH (ref 70–99)
GLUCOSE BLDC GLUCOMTR-MCNC: 124 MG/DL — HIGH (ref 70–99)
GLUCOSE BLDC GLUCOMTR-MCNC: 163 MG/DL — HIGH (ref 70–99)
GLUCOSE BLDC GLUCOMTR-MCNC: 163 MG/DL — HIGH (ref 70–99)
GLUCOSE BLDC GLUCOMTR-MCNC: 231 MG/DL — HIGH (ref 70–99)
GLUCOSE BLDC GLUCOMTR-MCNC: 231 MG/DL — HIGH (ref 70–99)
GLUCOSE BLDC GLUCOMTR-MCNC: 242 MG/DL — HIGH (ref 70–99)
GLUCOSE BLDC GLUCOMTR-MCNC: 242 MG/DL — HIGH (ref 70–99)
GLUCOSE SERPL-MCNC: 209 MG/DL — HIGH (ref 70–99)
GLUCOSE SERPL-MCNC: 209 MG/DL — HIGH (ref 70–99)
HCT VFR BLD CALC: 37.7 % — LOW (ref 39–50)
HCT VFR BLD CALC: 37.7 % — LOW (ref 39–50)
HGB BLD-MCNC: 11.2 G/DL — LOW (ref 13–17)
HGB BLD-MCNC: 11.2 G/DL — LOW (ref 13–17)
HGB FLD-MCNC: 10.4 G/DL — LOW (ref 12.6–17.4)
INR BLD: 1.68 RATIO — HIGH (ref 0.85–1.18)
INR BLD: 1.68 RATIO — HIGH (ref 0.85–1.18)
MAGNESIUM SERPL-MCNC: 2.5 MG/DL — SIGNIFICANT CHANGE UP (ref 1.6–2.6)
MAGNESIUM SERPL-MCNC: 2.5 MG/DL — SIGNIFICANT CHANGE UP (ref 1.6–2.6)
MCHC RBC-ENTMCNC: 24.8 PG — LOW (ref 27–34)
MCHC RBC-ENTMCNC: 24.8 PG — LOW (ref 27–34)
MCHC RBC-ENTMCNC: 29.7 GM/DL — LOW (ref 32–36)
MCHC RBC-ENTMCNC: 29.7 GM/DL — LOW (ref 32–36)
MCV RBC AUTO: 83.6 FL — SIGNIFICANT CHANGE UP (ref 80–100)
MCV RBC AUTO: 83.6 FL — SIGNIFICANT CHANGE UP (ref 80–100)
NRBC # BLD: 0 /100 WBCS — SIGNIFICANT CHANGE UP (ref 0–0)
NRBC # BLD: 0 /100 WBCS — SIGNIFICANT CHANGE UP (ref 0–0)
OXYHGB MFR BLDMV: 55.1 % — LOW (ref 90–95)
OXYHGB MFR BLDMV: 55.1 % — LOW (ref 90–95)
OXYHGB MFR BLDMV: 55.3 % — LOW (ref 90–95)
OXYHGB MFR BLDMV: 55.3 % — LOW (ref 90–95)
PLATELET # BLD AUTO: 228 K/UL — SIGNIFICANT CHANGE UP (ref 150–400)
PLATELET # BLD AUTO: 228 K/UL — SIGNIFICANT CHANGE UP (ref 150–400)
POTASSIUM SERPL-MCNC: 4.7 MMOL/L — SIGNIFICANT CHANGE UP (ref 3.5–5.3)
POTASSIUM SERPL-MCNC: 4.7 MMOL/L — SIGNIFICANT CHANGE UP (ref 3.5–5.3)
POTASSIUM SERPL-SCNC: 4.7 MMOL/L — SIGNIFICANT CHANGE UP (ref 3.5–5.3)
POTASSIUM SERPL-SCNC: 4.7 MMOL/L — SIGNIFICANT CHANGE UP (ref 3.5–5.3)
PROTHROM AB SERPL-ACNC: 18.2 SEC — HIGH (ref 9.5–13)
PROTHROM AB SERPL-ACNC: 18.2 SEC — HIGH (ref 9.5–13)
RBC # BLD: 4.51 M/UL — SIGNIFICANT CHANGE UP (ref 4.2–5.8)
RBC # BLD: 4.51 M/UL — SIGNIFICANT CHANGE UP (ref 4.2–5.8)
RBC # FLD: 16.3 % — HIGH (ref 10.3–14.5)
RBC # FLD: 16.3 % — HIGH (ref 10.3–14.5)
SAO2 % BLD: 56.5 % — LOW (ref 60–90)
SAO2 % BLD: 56.5 % — LOW (ref 60–90)
SAO2 % BLD: 56.8 % — LOW (ref 60–90)
SAO2 % BLD: 56.8 % — LOW (ref 60–90)
SODIUM SERPL-SCNC: 138 MMOL/L — SIGNIFICANT CHANGE UP (ref 135–145)
SODIUM SERPL-SCNC: 138 MMOL/L — SIGNIFICANT CHANGE UP (ref 135–145)
WBC # BLD: 9.81 K/UL — SIGNIFICANT CHANGE UP (ref 3.8–10.5)
WBC # BLD: 9.81 K/UL — SIGNIFICANT CHANGE UP (ref 3.8–10.5)
WBC # FLD AUTO: 9.81 K/UL — SIGNIFICANT CHANGE UP (ref 3.8–10.5)
WBC # FLD AUTO: 9.81 K/UL — SIGNIFICANT CHANGE UP (ref 3.8–10.5)

## 2023-11-14 PROCEDURE — 99233 SBSQ HOSP IP/OBS HIGH 50: CPT

## 2023-11-14 RX ORDER — BUMETANIDE 0.25 MG/ML
1 INJECTION INTRAMUSCULAR; INTRAVENOUS ONCE
Refills: 0 | Status: COMPLETED | OUTPATIENT
Start: 2023-11-14 | End: 2023-11-14

## 2023-11-14 RX ORDER — BUMETANIDE 0.25 MG/ML
2 INJECTION INTRAMUSCULAR; INTRAVENOUS DAILY
Refills: 0 | Status: DISCONTINUED | OUTPATIENT
Start: 2023-11-14 | End: 2023-11-14

## 2023-11-14 RX ORDER — WARFARIN SODIUM 2.5 MG/1
5 TABLET ORAL ONCE
Refills: 0 | Status: COMPLETED | OUTPATIENT
Start: 2023-11-14 | End: 2023-11-14

## 2023-11-14 RX ORDER — SODIUM CHLORIDE 5 G/100ML
150 INJECTION, SOLUTION INTRAVENOUS ONCE
Refills: 0 | Status: COMPLETED | OUTPATIENT
Start: 2023-11-14 | End: 2023-11-14

## 2023-11-14 RX ORDER — BUMETANIDE 0.25 MG/ML
3 INJECTION INTRAMUSCULAR; INTRAVENOUS ONCE
Refills: 0 | Status: DISCONTINUED | OUTPATIENT
Start: 2023-11-14 | End: 2023-11-14

## 2023-11-14 RX ADMIN — Medication 81 MILLIGRAM(S): at 14:17

## 2023-11-14 RX ADMIN — SODIUM CHLORIDE 300 MILLILITER(S): 5 INJECTION, SOLUTION INTRAVENOUS at 14:11

## 2023-11-14 RX ADMIN — BUMETANIDE 2 MILLIGRAM(S): 0.25 INJECTION INTRAMUSCULAR; INTRAVENOUS at 12:09

## 2023-11-14 RX ADMIN — TAMSULOSIN HYDROCHLORIDE 0.4 MILLIGRAM(S): 0.4 CAPSULE ORAL at 21:21

## 2023-11-14 RX ADMIN — Medication 1: at 17:23

## 2023-11-14 RX ADMIN — CHLORHEXIDINE GLUCONATE 1 APPLICATION(S): 213 SOLUTION TOPICAL at 05:30

## 2023-11-14 RX ADMIN — Medication 75 MILLIGRAM(S): at 21:21

## 2023-11-14 RX ADMIN — AMIODARONE HYDROCHLORIDE 400 MILLIGRAM(S): 400 TABLET ORAL at 14:16

## 2023-11-14 RX ADMIN — PANTOPRAZOLE SODIUM 40 MILLIGRAM(S): 20 TABLET, DELAYED RELEASE ORAL at 05:27

## 2023-11-14 RX ADMIN — AMIODARONE HYDROCHLORIDE 400 MILLIGRAM(S): 400 TABLET ORAL at 21:22

## 2023-11-14 RX ADMIN — BUMETANIDE 1 MILLIGRAM(S): 0.25 INJECTION INTRAMUSCULAR; INTRAVENOUS at 14:10

## 2023-11-14 RX ADMIN — ISOSORBIDE DINITRATE 30 MILLIGRAM(S): 5 TABLET ORAL at 12:10

## 2023-11-14 RX ADMIN — Medication 0: at 12:03

## 2023-11-14 RX ADMIN — SENNA PLUS 2 TABLET(S): 8.6 TABLET ORAL at 21:21

## 2023-11-14 RX ADMIN — Medication 6 UNIT(S): at 07:57

## 2023-11-14 RX ADMIN — Medication 75 MILLIGRAM(S): at 05:27

## 2023-11-14 RX ADMIN — Medication 6 UNIT(S): at 17:23

## 2023-11-14 RX ADMIN — Medication 75 MILLIGRAM(S): at 14:17

## 2023-11-14 RX ADMIN — Medication 25 MILLIGRAM(S): at 05:27

## 2023-11-14 RX ADMIN — WARFARIN SODIUM 5 MILLIGRAM(S): 2.5 TABLET ORAL at 21:21

## 2023-11-14 RX ADMIN — DAPAGLIFLOZIN 10 MILLIGRAM(S): 10 TABLET, FILM COATED ORAL at 05:26

## 2023-11-14 RX ADMIN — Medication 2: at 07:57

## 2023-11-14 RX ADMIN — ISOSORBIDE DINITRATE 30 MILLIGRAM(S): 5 TABLET ORAL at 05:26

## 2023-11-14 RX ADMIN — ATORVASTATIN CALCIUM 80 MILLIGRAM(S): 80 TABLET, FILM COATED ORAL at 21:21

## 2023-11-14 RX ADMIN — Medication 6 UNIT(S): at 12:10

## 2023-11-14 RX ADMIN — AMIODARONE HYDROCHLORIDE 400 MILLIGRAM(S): 400 TABLET ORAL at 05:27

## 2023-11-14 RX ADMIN — INSULIN GLARGINE 15 UNIT(S): 100 INJECTION, SOLUTION SUBCUTANEOUS at 21:20

## 2023-11-14 NOTE — PROGRESS NOTE ADULT - NS ATTEND AMEND GEN_ALL_CORE FT
Seen, examined with, formulated plan with and  agree with above as scribed by NP Pauline [[email or web address]]     lungs no rales no wheezing  heart RRR  ext no edema    LAURA   CHF    cr stabilizing at this level   cont diuretic keep O>I   hold faxiga for now   keep O> I   hydralazine for htn and after load   d.w primary team

## 2023-11-14 NOTE — PROGRESS NOTE ADULT - SUBJECTIVE AND OBJECTIVE BOX
Subjective:  - Denies SOB at rest, orthopnea or PND.   - UOP inaccurate and no standing weights    Medications:  aMIOdarone    Tablet   Oral   aMIOdarone    Tablet 400 milliGRAM(s) Oral every 8 hours  aspirin enteric coated 81 milliGRAM(s) Oral daily  atorvastatin 80 milliGRAM(s) Oral at bedtime  buMETAnide Injectable 2 milliGRAM(s) IV Push daily  chlorhexidine 2% Cloths 1 Application(s) Topical <User Schedule>  dapagliflozin 10 milliGRAM(s) Oral every 24 hours  hydrALAZINE 75 milliGRAM(s) Oral every 8 hours  insulin glargine Injectable (LANTUS) 15 Unit(s) SubCutaneous at bedtime  insulin lispro (ADMELOG) corrective regimen sliding scale   SubCutaneous three times a day before meals  insulin lispro (ADMELOG) corrective regimen sliding scale   SubCutaneous at bedtime  insulin lispro Injectable (ADMELOG) 6 Unit(s) SubCutaneous three times a day before meals  isosorbide   dinitrate Tablet (ISORDIL) 30 milliGRAM(s) Oral three times a day  metoprolol succinate ER 25 milliGRAM(s) Oral daily  pantoprazole    Tablet 40 milliGRAM(s) Oral before breakfast  polyethylene glycol 3350 17 Gram(s) Oral daily PRN  senna 2 Tablet(s) Oral at bedtime  tamsulosin 0.4 milliGRAM(s) Oral at bedtime    Physical Exam:    Vitals:  Vital Signs Last 24 Hours  T(C): 37.1 (23 @ 04:29), Max: 37.1 (23 @ 04:29)  HR: 72 (23 @ 04:29) (72 - 81)  BP: 97/66 (23 @ 04:29) (93/54 - 107/66)  RR: 18 (23 @ 04:29) (16 - 18)  SpO2: 97% (23 @ 04:29) (95% - 98%)    Weight in k ( @ 08:26)    I&O's Summary    2023 07:01  -  2023 07:00  --------------------------------------------------------  IN: 0 mL / OUT: 400 mL / NET: -400 mL    Tele: SR 70-80s    General: No distress. Comfortable.  HEENT: EOM intact.  Neck: Neck supple. JVP 14-16 cm H2O  Chest: Clear to auscultation bilaterally  CV: Normal S1 and S2. Radial pulses normal.  Abdomen: Soft, non-distended, non-tender  Extremities: Warm peripherally No edema  Neurology: Alert and oriented times three. Sensation intact  Psych: Affect normal    Labs:                        11.2   9.81  )-----------( 228      ( 2023 05:41 )             37.7     11-14    138  |  103  |  60<H>  ----------------------------<  209<H>  4.7   |  20<L>  |  3.12<H>    Ca    8.8      2023 05:41  Mg     2.5     11-14      PT/INR - ( 2023 05:41 )   PT: 18.2 sec;   INR: 1.68 ratio         PTT - ( 2023 06:15 )  PTT:29.1 sec

## 2023-11-14 NOTE — PROGRESS NOTE ADULT - SUBJECTIVE AND OBJECTIVE BOX
Bernarda Reed MD  Division of Hospital Medicine  Please contact via MS Teams (prefer message first)  Office: [phone]    Patient is a 58y old  Male who presents with a chief complaint of NSTEMI (14 Nov 2023 10:54)      SUBJECTIVE / OVERNIGHT EVENTS:  no acute events overnight, vss, afebrile  s/p RHC/cardiomems, with elevated filling pressure  pt has no complaints    ROS:  14 point ROS negative in detail except stated as above    MEDICATIONS  (STANDING):  aMIOdarone    Tablet   Oral   aMIOdarone    Tablet 400 milliGRAM(s) Oral every 8 hours  aspirin enteric coated 81 milliGRAM(s) Oral daily  atorvastatin 80 milliGRAM(s) Oral at bedtime  buMETAnide Injectable 2 milliGRAM(s) IV Push daily  chlorhexidine 2% Cloths 1 Application(s) Topical <User Schedule>  dapagliflozin 10 milliGRAM(s) Oral every 24 hours  hydrALAZINE 75 milliGRAM(s) Oral every 8 hours  insulin glargine Injectable (LANTUS) 15 Unit(s) SubCutaneous at bedtime  insulin lispro (ADMELOG) corrective regimen sliding scale   SubCutaneous three times a day before meals  insulin lispro (ADMELOG) corrective regimen sliding scale   SubCutaneous at bedtime  insulin lispro Injectable (ADMELOG) 6 Unit(s) SubCutaneous three times a day before meals  isosorbide   dinitrate Tablet (ISORDIL) 30 milliGRAM(s) Oral three times a day  metoprolol succinate ER 25 milliGRAM(s) Oral daily  pantoprazole    Tablet 40 milliGRAM(s) Oral before breakfast  senna 2 Tablet(s) Oral at bedtime  tamsulosin 0.4 milliGRAM(s) Oral at bedtime    MEDICATIONS  (PRN):  polyethylene glycol 3350 17 Gram(s) Oral daily PRN Constipation      CAPILLARY BLOOD GLUCOSE      POCT Blood Glucose.: 231 mg/dL (14 Nov 2023 07:44)  POCT Blood Glucose.: 227 mg/dL (13 Nov 2023 21:09)  POCT Blood Glucose.: 184 mg/dL (13 Nov 2023 17:21)    I&O's Summary    13 Nov 2023 07:01  -  14 Nov 2023 07:00  --------------------------------------------------------  IN: 0 mL / OUT: 400 mL / NET: -400 mL        PHYSICAL EXAM:  Vital Signs Last 24 Hrs  T(C): 37.1 (14 Nov 2023 04:29), Max: 37.1 (13 Nov 2023 12:07)  T(F): 98.7 (14 Nov 2023 04:29), Max: 98.8 (13 Nov 2023 12:07)  HR: 72 (14 Nov 2023 04:29) (72 - 81)  BP: 97/66 (14 Nov 2023 04:29) (93/54 - 107/66)  BP(mean): --  RR: 18 (14 Nov 2023 04:29) (16 - 18)  SpO2: 97% (14 Nov 2023 04:29) (95% - 98%)    Parameters below as of 14 Nov 2023 04:29  Patient On (Oxygen Delivery Method): nasal cannula  O2 Flow (L/min): 2    GENERAL: NAD, well-developed  HEAD:  Atraumatic, Normocephalic  EYES: EOMI, PERRLA, conjunctiva and sclera clear  NECK: Supple, No JVD  CHEST/LUNG: Clear to auscultation bilaterally; No wheeze  HEART: Regular rate and rhythm; No murmurs, rubs, or gallops  ABDOMEN: Soft, Nontender, Nondistended; Bowel sounds present  EXTREMITIES:  2+ Peripheral Pulses, No clubbing, cyanosis, or edema  NEUROLOGY: AAOx3; non-focal  SKIN: No rashes or lesions    LABS:  personally reviewed                        11.2   9.81  )-----------( 228      ( 14 Nov 2023 05:41 )             37.7     11-14    138  |  103  |  60<H>  ----------------------------<  209<H>  4.7   |  20<L>  |  3.12<H>    Ca    8.8      14 Nov 2023 05:41  Mg     2.5     11-14      PT/INR - ( 14 Nov 2023 05:41 )   PT: 18.2 sec;   INR: 1.68 ratio         PTT - ( 13 Nov 2023 06:15 )  PTT:29.1 sec      Urinalysis Basic - ( 14 Nov 2023 05:41 )    Color: x / Appearance: x / SG: x / pH: x  Gluc: 209 mg/dL / Ketone: x  / Bili: x / Urobili: x   Blood: x / Protein: x / Nitrite: x   Leuk Esterase: x / RBC: x / WBC x   Sq Epi: x / Non Sq Epi: x / Bacteria: x        RADIOLOGY & ADDITIONAL TESTS:    Imaging Personally Reviewed:    Consultant(s) Notes Reviewed:  nephrology, HF    Care Discussed with Consultants/Other Providers: Dr. Pickett (nephro)

## 2023-11-14 NOTE — PROGRESS NOTE ADULT - ASSESSMENT
57 YO M with history of CVA with residual mild R paresthesias, second degree Mobitz II heart block s/p DC-ICD 12/2022 (initial concern for sarcoid but negative biopsy/PET post procedure), DM2 (A1c 8.4%), and HTN who was admitted to [facility] with chest pain and dyspnea, found to have NSTEMI with markedly elevated troponins and new severe LV dysfunction with regional wall motion abnormalities as well as + enterovirus. He required NIPPV and was diuresed before being transferred to [facility] where LHC performed which revealed severe 3v CAD with critical proximal LAD involvement. CTS was consulted for CABG evaluation and HF consulted for comanagement. Given concern for low flow status sent back to CCU 10/28. Found to have LV thrombus. AMS prompting CT head with angio on 11/6 revealing R medial cerebellar infarct. MRI showing evolving PICA stroke and severe stenosis of L vertebral artery and occlusion o nondominant R vertebral artery. Given evolving stroke, brain lesion and ongoing confusion, not a candidate for coronary intervention. Additionally, per CTS review, no LAD viability. on 11/8, exhibited sustained MMVT that fell into monitoring zone of ICD that spontaneously terminated. Initiated on PO Amio load. Tolerated wean off Milrinone 11/1 with escalation of vasodilators.     He underwent RHC/CardioMEMS [id number] for longitudinal management, hemodynamics revealing elevated filling pressures and borderline CI. Will aggressively diurese and follow persistent LAURA. Eventual intervention to vertebral arteries with IR once LAURA resolves.     Cardiac Studies  ·	RHC w/ CardioMEMS: RA 14 (v to 16), PA 46/24/32, PCWP 25 (v to 30), %, PA 56.8%, CO/CI (F) 4.2/2.19, /65 (86)  ·	TTE limited 11/8/23: LVEF <20%, compared to prior LV thrombus much smaller  ·	TTE 10/26/23: EF <20% LVT present, small pericardial effusion w/o tamponade  ·	RHC 10/19/23: RA 10, RV 47/9/13, Wedge 29, PA 41/26/33, CO/CI 4.4/2.3, PA sat 57.3  ·	CMR 10/18/23: There is greater than 50% thickness subendocardial LGE within the mid anteroseptum, anterior and anterolateral segments and apical segments  ·	TTE 10/16/23: LV 5.5 cm, LVEF 20% with regional WMAs worse in the apex, LVOT VTI 8 cm, normal RV size/function, severe functional MR, small pericardial effusion, estimated RA pressure 8 mmHg   ·	LHC 10/16/23: 95% discrete proximal LAD disease and sequential multifocal disease with good distal target, 80-90% proximal LCx disease just prior to marginals, severe multifocal RCA disease with good targets   ·	TTE 12/2022: normal LVEF     Hemodynamics  10/30 RHC: RHC: RA 14/13/12, RV 51/14, PA 47/34/39, PCW 34/36/32, CI 2.5/CO4.95   11/1/23 CVP 13, PA 48/23/35, mVO2 62.6%, Cris CI 2.4  10/21/23: RA 13 with V-wave 21, PA 50/33, PCW 33, MvO2 68.2, Cris CO/CI 4.4/2.56

## 2023-11-14 NOTE — PROGRESS NOTE ADULT - ASSESSMENT
59 yo Male pmhx CVA with mild left sided deficits, HTN, DM2, vertigo, HLD, Mobitz II s/pp [employer] dual chamber ICD (12/21/22) initially presented to [facility] from home with complaints of dyspnea and chest pain and was admitted w/ acute hypoxic respiratory failure likely due to acute pulmonary edema due to acute HFrEF in setting of acute NSTEMI, LAURA and enterovirus infection. Pt with brief MICU stay at [facility] requiring bipap (no intubation), was treated for PNA with cefepime, and was found to have TTE done 9/26/23 showing EF 20% with severely decreased LVSF, multiple regional wall motion abnormalities, and elevated LV end diastolic pressure. Pt was transferred to [facility] for cardiac evaluation. [facility] performed which revealed severe 3v CAD with critical proximal LAD involvement. CTS was consulted for CA Found to have LV thrombus. AMS prompting CT head with angio on 11/6 revealing R medial cerebellar infarct. MRI showing evolving PICA stroke and severe stenosis of L vertebral artery and occlusion o nondominant R vertebral artery.R cerebellar/PICA infarct as well as L parietal embolic infarct, likely cardioembolic neurosx plans for cerebral angio.    [facility] 10/30  CTA 11/6    1- LAURA   2- CVA  3- cardiomyopathy  4- NSTEMI/3 vessel disease  5- CHF   6- htn     overall LAURA in setting of decompensated CHF requiring diuresis. creatinine had been holding steady ~2.2 (10/30-11/10)  now with worsening LAURA since 11/11  last contrast study was 11/6, for CTA. with creatinine rise 11/11, not suspecting contrast related.   farxiga was added 11/10 and with worsening creatinine   neuro planning neuro angio, however now with laura, and will have high risk contrast nephropathy. would hold off on angio, unless it becomes urgent/emergent.  would hold farxiga for now due to LAURA  continue diuresis with bumex, for CHF decompensation.   hydralazine 75 mg tid for htn and afterload reduction   strict I/O  trend creatinine and electrolytes closely

## 2023-11-14 NOTE — PROGRESS NOTE ADULT - PROBLEM SELECTOR PLAN 1
Scr continue to trend up to 3.1 this morning, baseline 1.28 on Oct 15, 2023  - likely in setting of cardiorenal  - s/p cardiomems with elevated pressure  - start IV bumex 2mg daily and assess  - renal US with No hydronephrosis. Increased cortical echogenicity, suggestive of renal parenchymal disease  - appreciate nephro recs: care discussed with Dr. Pickett - agree with holding off angiogram at this time

## 2023-11-14 NOTE — PROGRESS NOTE ADULT - PROBLEM SELECTOR PLAN 1
- Etiology: ischemic with LVEF <20%  - Bedside IVC dilated, provide Bumex 3 mg IV x1 today and 3% Hypertonic saline 150 cc over 30 mins today  - Continue Toprol XL 25 mg QD  - Continue HDZN 75 mg TID and ISDN 30 mg TID, hold for SBP <90  - Continue Farxiga 10 mg QD  - Document daily standing weights  - Please engage PT to prevent deconditioning  - Advanced therapies: holding off on launching VAD/transplant eval given cognitive issues however will continue to reassess. Of note he has good support and no clear psychosocial red flags. ABO AB.

## 2023-11-14 NOTE — PROGRESS NOTE ADULT - SUBJECTIVE AND OBJECTIVE BOX
[facility]   [phone]  RENAL FOLLOW UP NOTE  --------------------------------------------------------------------------------  Chief Complaint:    24 hour events/subjective:    patient seen and examined.   +chills  denies worsening sob    PAST HISTORY  --------------------------------------------------------------------------------  No significant changes to PMH, PSH, FHx, SHx, unless otherwise noted    ALLERGIES & MEDICATIONS  --------------------------------------------------------------------------------  Allergies    No Known Allergies    Intolerances      Standing Inpatient Medications  aMIOdarone    Tablet   Oral   aMIOdarone    Tablet 400 milliGRAM(s) Oral every 8 hours  aspirin enteric coated 81 milliGRAM(s) Oral daily  atorvastatin 80 milliGRAM(s) Oral at bedtime  buMETAnide Injectable 1 milliGRAM(s) IV Push once  buMETAnide Injectable 2 milliGRAM(s) IV Push daily  chlorhexidine 2% Cloths 1 Application(s) Topical <User Schedule>  dapagliflozin 10 milliGRAM(s) Oral every 24 hours  hydrALAZINE 75 milliGRAM(s) Oral every 8 hours  insulin glargine Injectable (LANTUS) 15 Unit(s) SubCutaneous at bedtime  insulin lispro (ADMELOG) corrective regimen sliding scale   SubCutaneous three times a day before meals  insulin lispro (ADMELOG) corrective regimen sliding scale   SubCutaneous at bedtime  insulin lispro Injectable (ADMELOG) 6 Unit(s) SubCutaneous three times a day before meals  isosorbide   dinitrate Tablet (ISORDIL) 30 milliGRAM(s) Oral three times a day  metoprolol succinate ER 25 milliGRAM(s) Oral daily  pantoprazole    Tablet 40 milliGRAM(s) Oral before breakfast  senna 2 Tablet(s) Oral at bedtime  sodium chloride 3% Bolus 150 milliLiter(s) IV Bolus once  tamsulosin 0.4 milliGRAM(s) Oral at bedtime  warfarin 5 milliGRAM(s) Oral once    PRN Inpatient Medications  polyethylene glycol 3350 17 Gram(s) Oral daily PRN      REVIEW OF SYSTEMS  --------------------------------------------------------------------------------    Gen: +chills,  CVS: denies chest pain/palpitations  Resp: +SOB/Cough  GI: Denies N/V/Abd pain  : Denies dysuria    VITALS/PHYSICAL EXAM  --------------------------------------------------------------------------------  T(C): 36.3 (23 @ 12:11), Max: 37.1 (23 @ 04:29)  HR: 73 (23 @ 12:11) (72 - 81)  BP: 102/63 (23 @ 12:11) (93/54 - 107/66)  RR: 18 (23 @ 12:11) (16 - 18)  SpO2: 97% (23 @ 12:11) (95% - 98%)  Wt(kg): --        23 @ 07:01  -  23 @ 07:00  --------------------------------------------------------  IN: 0 mL / OUT: 400 mL / NET: -400 mL      Physical Exam:  	  	Gen: ill appearing male   	Pulm: decrease bs  no rales or ronchi or wheezing  	CV: +JVD. RRR, S1S2; no rub  	Back: No CVA tenderness; no sacral edema  	Abd: +BS, soft, nontender/nondistended  	: No suprapubic tenderness  	UE: Warm, no cyanosis  no clubbing,  no edema; no asterixis  	LE: Warm, no cyanosis  no clubbing, no edema  	Neuro: alert and oriented. speech coherent     LABS/STUDIES  --------------------------------------------------------------------------------              11.2   9.81  >-----------<  228      [23 05:41]              37.7     138  |  103  |  60  ----------------------------<  209      [23 05:41]  4.7   |  20  |  3.12        Ca     8.8     [23 05:41]      Mg     2.5     [23 05:41]      PT/INR: PT 18.2 , INR 1.68       [23 05:41]  PTT: 29.1       [23 @ 06:15]      Creatinine Trend:  SCr 3.12 [:41]  SCr 3.13 [ 06:13]  SCr 2.91 [ 06:52]  SCr 2.63 [ 06:37]  SCr 2.38 [11-10 @ 06:32]          TSH 0.34      [11-10-23 @ 06:32]  Lipid: chol 167, TG 80, HDL 52, LDL --      [10-17-23 @ 08:16]    Syphilis Screen (Treponema Pallidum Ab) Negative      [10-29-23 @ 00:03]    < from: US Kidney and Bladder (23 @ 17:27) >  ACC: [id number] EXAM:  US KIDNEYS AND BLADDER   ORDERED BY: MOHSINUZZAMAN KHAN     PROCEDURE DATE:  2023          INTERPRETATION:  CLINICAL INFORMATION: Acute kidney injury.    COMPARISON: None available.    TECHNIQUE: Sonography of the kidneys and bladder.    FINDINGS:      Right kidney: 9.3. No hydronephrosis or calculi. Increased cortical   echogenicity. Lower pole cyst measuring 1.3 x 1.3 x 1.3 cm.  She was  Left kidney: 9.9. No hydronephrosis or calculi. Increased echogenicity.   Twocysts measuring up to 3.4 cm.    Urinary bladder: Underdistended.    Bilateral pleural effusions.    IMPRESSION:  No hydronephrosis. Increased cortical echogenicity, suggestive of renal   parenchymal disease.    Small bilateral pleural effusions.          --- End of Report ---      < end of copied text >    < from: TTE W or WO Ultrasound Enhancing Agent (23 @ 07:07) >  TRANSTHORACIC ECHOCARDIOGRAM REPORT  ________________________________________________________________________________                                      _______       Pt. Name:       BRANDEN MARRUFO   Study Date:    2023  MRN:            [id number]   YOB: 1965  Accession #:    [id number]    Age:           58 years  Account#:       [id number] Gender:        M  Heart Rate:                  Height:        72.00 in (182.88 cm)  Rhythm:                      Weight:        154.00 lb (69.85 kg)  Blood Pressure: 122/80 mmHg  BSA/BMI:       1.91 m² / 20.89 kg/m²  ________________________________________________________________________________________  Referring Physician:    [id number] Kojo Han  Interpreting Physician: Puneet Beltrán M.D.  Primary Sonographer:    Jany Goel RDCS    CPT:                ECHO TTE W CON FU LTD - .m;LIMITED SPECTRAL -                      57728.m;DEFINITY ECHO CONTRAST PER ML WASTED -                      .m;DEFINITY ECHO CONTRASTPER ML - .m  Indication(s):      Heart failure, unspecified - I50.9  Procedure:          Limited transthoracic echocardiogram.  Ordering Location:  CICU  Contrast Injection: Verbal consent was obtained for injection of Ultrasonic       Enhancing Agent following a discussion of risks and                      benefits.                      Endocardial visualization enhanced with 2 ml of Definity                      Ultrasound enhancing agent (Lot#:[id number] Discarded Dose:8ml).  UEAReaction:       Patient had no adverse reaction after injection of                      Ultrasound Enhancing Agent.  Study Information:  Image quality for this study is adequate.    _______________________________________________________________________________________     CONCLUSIONS:      1. Left ventricular systolic function is severely decreased with an ejection fraction visually estimated at <20 %. Global left ventricular hypokinesis.   2. Compared to the transthoracic echocardiogram performed on 10/26/2023 the thrombus is much smaller.    ________________________________________________________________________________________  FINDINGS:     Left Ventricle:  Left ventricular systolic function is severely decreased with an ejection fraction visually estimated at <20%. There is global left ventricular hypokinesis. There is a small left ventricular thrombus.  ____________________________________________________________________  Quantitative Data:  Left Ventricle Measurements: (Indexed to BSA)     Visualized LV EF%: <20%       LVOT / RVOT/ Qp/Qs Data: (Indexed to BSA)  LVOT Diameter: 2.30 cm  LVOT Vmax:     0.58 m/s  LVOT VTI:      8.07 cm  LVOT SV:       33.5 ml  17.59 ml/m²    ________________________________________________________________________________________  Electronically signed on 2023 at 1:36:16 PM by Puneet Landon M.D.         *** Final ***    < end of copied text >    < from: Cardiac Catheterization (23 @ 13:50) >  Study Date:     2023   Name:           BRANDEN MARRUFO   :            1965   (58 years)   Gender:         male   MR#:            [id number]   MPI#:           [id number]   Patient Class:  Inpatient     Cath Lab Report    Diagnostic Cardiologist:       Pierre Frost MD   Interventional Cardiologist:   Pierre Frost MD   Referring Physician:           Jessie Kent MD   Referring Physician:           Maikel Spring MD     Procedures Performed   Procedures:              1. Venous Access - Right Femoral     2.    RHC   3.    CardioMems Implant   4.    Ultrasound Guided Access   5.    PA Angiography   6.    Vascade     Indications:               Congestive heart failure, acute on chronic  diastolic  Lab Visit Indication:      ACDiaCHF     Conclusions:     Elevated right atrial pressure (mRA 14mmHg with a V wave of 16mmHg)   Moderate post-capillary pulmonary hypertension (sPAP 46mmHg, dPAP  24mmHg, mPAP 32mmHg)  PCWP = 25mmHg with a V wave of 30mmHg   PAsat = 56.8% // RAsat = 100% (2L NC)   Cris CO // CI = 4.21l/min // 2.19l/min/m2   SBP = 108/65/86     Status post placement of pulmonary artery pressure monitoring device  (CardioMems) in the left pulmonary artery    Recommendations:     Keep right leg straight for 4 hours following removal of sheaht  (Vascade closure device)  Continue coumadin with goal INR 2-3   May resume heparin in 6 hours if groin site is stable in nature   Continue aggressive medical management     Findings discussed with heart failure team/Dr. Kent     Procedure Narrative:   The risks and alternatives of the procedures and conscious sedation  were explained to the patient and informed consent was  obtained. The patient was brought to the cath lab and placed on the  exam table.  Access   Right femoral vein:     Patient: BRANDEN MARRUFO                MRN: [id number]  Study Date: 2023   01:50 PM      Page 1 of 4          The puncture site was infiltrated with 1% Lidocaine. Vascular access  was obtained using Vascular Ultrasound.    Right Heart Cath   Measurements of pressures were obtained.      Diagnostic Findings:     X-Ray:   Diagnostic Flouro time:      6.5 min.                   Exam record  DAP:          1634.00 cGycm2  Interventional Flouro time:           Exam fluoro  DAP:          1663.40 cGycm2  Total Flouro Time:           6.5 min.                   Exam total  DAP:           3297.40  cGycm2    Exam Start Time:   01:50 PM   Exam End Time:     02:17 PM   Exam Duration:     27 min     Contrast:   Description                   Dose         Unit       Serial No.   Omnipaque                         10.000   mL     Medication:   Description                 Dose, Unit, Route, Time     Inventory:   Description                 Quantity     Peoplesoft#       Reference  No.    Serial No.     Lot No.       CDM  CATH PACK                 1.000        [id number]   [id number]  [id number]    030   HEPARIN SALINE 500ML      1.000        [id number]   [id number]  [id number]    397   HEPARINSALINE 500ML      1.000        [id number]   [id number]  [id number]    397   .035 X 150 GUIDERIGHT     1.000        [id number]   [id number]  [id number]    594   OMNIPAQUE 350 150ML       1.000        [id number]   [id number]  [id number]    368   7FR SHEATH PINNACLE       1.000        [id number]   [id number]  [id number]    643   11FR SHEATH PINNACLE      1.000        [id number]   [id number]  [id number]    056   7FR BALLOON WEDGE         1.000        [id number]   [id number]  [id number]  PRESSURE SWAN                         759   ROADPeak Behavioral Health ServicesER .018 X 270     1.000        [id number]   [id number]  [id number]    234   CARDIOMEMS LL AND         1.000        [id number]   CM PATIENT  [id number]  CELLULAR                               402               SYSTEM   VASCADE MVP 6F            1.000        [id number]   [id number]  [id number]    710     Patient: BRANDEN MARRUFO                MRN: [id number]  Study Date: 2023   01:50 PM      Page 2 of 4          Hemodynamic Pressures:   Phase         Location          [mmHg]               [mmHg]  [mmHg]           HR  Phase 0       RV                  s      46  ed      14         72  Phase 0       PA                  s      46                d      24  m   32         72  Phase 0       PCW      a      26                v      30  m   25         72  Phase 0       RA                  a      17                v      16  m   14         73  Intervention  PA                  s      49                d      25  m   33         73    Oxygen Saturations   Phase          Location        Hb             Sat            pO2  Content        Group  Phase 0         PA                       10              57  8.03   PA  Phase 0         AO                       10             100  14.14   SA    Oxygen Saturation Average, Phase: Phase 0   PV Hb                PV Sat                 PV pO2  PV Content  SA Hb      10.40 g/dL SA Sat     100.00 %   SA pO2  SA Content     14.14 %  PA Hb      10.40 g/dL PA Sat     56.80 %    PA pO2  PA Content     8.03%  MV Hb                MV Sat                 MV pO2  MV Content  Strokework:   Phase:                    Phase 0   LVSW:                                           LVSW (index):   RVSW:                     13.51 g*m             RVSW (index):  7.02 g*m/m2  Flow Calculations, Phase: Phase 0   CO                    4.21 l/min    HR  O2Insp  CI                 2.19 l/min/m2    PO2  O2Exp  SV                                  VO2                 257.00 ml/min  O2(ExAir)  SVI     A-VO2  Hb                 10.40 gm/d  Shunts:   Qs                    4.21 l/min    Qs Index              2.19  l/min/m2  Qp                    4.21 l/min    Qp Index              2.19  l/min/m2    Qp/Qs  EPBF                                EPBFIndex   L-R                                 L-R Index   R-L                                 R-L Index   Systemic Resistances, Phase: Phase 0   SVR                                              TVR   SVRI                                             TVRI   SVR                                              TVR   SVRI                                             TVRI   Pulmonary Resistances:   PVR              114.09 dyn*s/cm5                TPR  589.48 dyn*s/cm5  PVRI             219.53 dyn*s*m2/cm5         TPRI  1134.22 dyn*s*m2/cm5  PVR              1.43 JACKSON                         TPR  7.37 JACKSON  PVRI             2.74 JACKSON*m2                      TPRI  14.18 JACKSON*m2  Ratios:   PVR-SVR                                          TPR-TVR   Shunts:     Patient: BRANDEN MARRUFO                MRN: [id number]  Study Date: 2023   01:50 PM      Page 3 of 4          venO2 CO   R-L Shunt                                        R-L Shunt   Romina. Method   L-R Shunt                                        L-R Shunt   R-L Shunt                                        R-L Shunt   Romina. Method     Conscious sedation was administered and monitored by Cardiology  nursing staff,  under my direct supervision when applicable.     Electronically signed by Pierre Frost MD on 2023 at 02:37 PM     Patient: BRANDEN MARRUFO                MRN: [id number]  Study Date: 2023   01:50 PM      Page 4 of 4    < end of copied text >

## 2023-11-14 NOTE — PROGRESS NOTE ADULT - ASSESSMENT
Assessment/Plan:   HPI:  Patient with separate chart from [facility], MRN# [id number]    59 yo Male pmhx CVA with mild left sided deficits, HTN, DM2, vertigo, HLD, Mobitz II s/pp Medtronic dual chamber ICD (12/21/22) initially presented to [facility] from home with complaints of dyspnea and chest pain and was admitted w/ acute hypoxic respiratory failure likely due to acute pulmonary edema due to acute HFrEF in setting of acute NSTEMI, LAURA and enterovirus infection. Pt with brief MICU stay at [facility] requiring bipap (no intubation), was treated for PNA with cefepime, and was found to have TTE done 9/26/23 showing EF 20% with severely decreased LVSF, multiple regional wall motion abnormalities, and elevated LV end diastolic pressure. Pt was transferred to [facility] for cardiac cath evaluation. Patient without any acute complaints, complaining of intermittent palpitations for the last 2 days. No chest pain, SOB, fevers, nausea, vomiting, abdominal pain.  (13 Oct 2023 21:05)      - s/p RHC/pulmonary angiography/cardioMEMS placement via RFV s/p Vascade 11/13/23 without complication  - Groin site stable.   - Continue ASA, Coumadin per primary team  - Continue statin  - Recommend a heart healthy diet which includes a variety of fruits and vegetables, whole grains, low fat dairy products, legumes and skinless poultry and fish; food prepared with little or no salt and minimize processed foods  - Avoid using NSAIDs  (Aleve, Motrin, ibuprofen, naproxen) while on DAPT, please utilize Tylenol for pain control (not to exceed 4gm in 24 hours)  - Patient aware to take ASA  as prescribed and DO NOT STOP taking without consulting cardiologist first   - Reviewed and reinforced with patient:  site complications ( eg: bleeding, excruciating pain at the procedural site, large swelling ball size-  extremity numbness, tingling, temperature change), or CHEST PAIN; pt aware that if any of those occur he/she must call cardiologist IMMEDIATELY or 911 or go to nearest emergency room   - Reviewed and reinforced a heart healthy diet, Smoking Cessation  - Patient verbalizes understanding of ALL OF THE ABOVE, and gives positive feedback   -please make sure DAPT is prescribed to pt's preferred pharmacy on dc   -f/u appt in 2 weeks post dc with outpt cardiologist  - Keep Mg >2 K>4   - cont to monitor on tele  - all other care as per primary     María Kitchen [facility]-BC  Invasive Cardiology    Please check [email or web address] password [email or web address] for cardiology service schedule and contact information via TEAMS.

## 2023-11-14 NOTE — PROGRESS NOTE ADULT - ASSESSMENT
59 yo Male with prior Stroke with mild ?left sided deficits (improved), HTN, DM2, vertigo, HLD, Mobitz II s/pp Medtronic dual chamber ICD (12/21/22) initially presented to [facility] from home with complaints of dyspnea and chest pain and was admitted w/ acute hypoxic respiratory failure likely due to acute pulmonary edema due to acute HFrEF in setting of acute NSTEMI, LAURA and enterovirus infection. Pt with brief MICU stay at [facility] requiring bipap (no intubation), was treated for PNA with cefepime, and was found to have TTE done 9/26/23 showing EF 20% with severely decreased LVSF, multiple regional wall motion abnormalities, and elevated LV end diastolic pressure. Pt was transferred to [facility] for cardiac eval.   TTE EF < 20%  RHC 10/19: RA 10, RV 47/9/13, Wedge 29, PA 41/26/33, CO/CI 4.4/2.3, PA sat 57.3  EKG: sinus tachycardia, septal q-waves, left axis deviation   TTE 10/16/23: LV 5.5 cm, LVEF 20% with regional WMAs worse in the apex, LVOT VTI 8 cm, normal RV size/function, severe functional MR, small pericardial effusion, estimated RA pressure 8 mmHg   TTE 12/2022: normal LVEF   LHC: 95% discrete proximal LAD disease and sequential multifocal disease with good distal target, 80-90% proximal LCx disease just prior to marginals, severe multifocal RCA disease with good targets   A1c 8.4    CD 10/17 neg   NIHSS 1  CTH with age indeterminate R cerebellar infarct.  likely chronic   CTA with mod L MCA stenosis, R VA occlusion, severe L VA stenosis   MRi brain with eovlving acute/subacute R PICA infarct with mild edema and mild hemorrhagic transformation. 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  occluded R VA. severe L VA stenosis   CTH stable   RRT/code stroke on 11/6 in setting of SBP 80s/50s   CTH stable. old R cerebellar infarct. CTA with severe L VA stenosis ; R VA occlusion   11/8 RRT transferred to CCU. no neuro changes   MR NOVA: Old right medial cerebellar infarct with hemosiderin staining. Small vessel white matter ischemic changes. Left occipital white matter infarct with diffusion restriction. Scattered foci of hemosiderin deposition in the basal ganglia and left parietal region.Occluded nondominant right vertebral artery. There is stenosis V4 segment dominant left vertebral artery. Significant left M1 stenosis using noninvasive flow MR angiography.  s/p cath        Impression:   R cerebellar/PICA infarct as well as L parietal embolic infarct, likely cardioembolic   worsening symptoms in setting of hypotension,m now improved       - neurosx plans for cerebral angio when Cr allows   - -  patient will be high risk from neuro standpoint for cardiac intervention however benefits at this point seem to outweigh risks.  would consider PCI.  triple therapy will be needed    - keep SBP > 100; becomes symptomatic when BP drops   - c/o SOB ; f/u cardio and pulmo   - f/u heart failure team   - can consider routine EEG but low yield   - CTS eval for CABG given 3 vessel disease vs high risk PCI --> not a CABG candidate ; possible PCI planning but would need "triple therapy"   - c/w asa and statin therpay for secondary stroke prevention.   - no objection to heparin drip for low EF and LV thrombus  --> no objection to transition to coumadin ; hep to coumadin bridge ; coumadin per INR   - called for risk stratification for heart transplant eval,  low-mod risk from stroke standpoint and no objection   - b12, TSH, RPR for reversible causes of dementia   - telemetry  - PT/OT   - check FS, glucose control <180  - GI/DVT ppx   - Thank you for allowing me to participate in the care of this patient. Call with questions.   spoke with primary team     Abelardo Irving MD  Vascular Neurology  Office: [phone]

## 2023-11-14 NOTE — PROGRESS NOTE ADULT - PROBLEM SELECTOR PLAN 3
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 Status post [facility]-> 95% discrete proximal LAD disease and sequential multifocal disease with good distal target, 80-90% proximal LCx disease, severe multifocal RCA. Limited viability on MRI. Echocardiogram with LVEF <20%, rWMA & LV thrombus  - HF team & EP card f/u plans appreciated, continue with GDMT (Metoprolol ER 25mg/d, ARB/ARNI- none due to LAURA, No MRA, eventual SGLT2i)  - Hydral 75mg tid and ISDN 30mg TID  - Inotropes: Off milrinone since 11/1  - Advanced therapies- holding off on launching VAD/transplant eval given cognitive issues   - No longer a PCI candidate due to contraindications for ECMO, which likely would be required as per EP note. Will reassess following completion of amiodarone load.    - daily weight and I/O  - resumed bumex 2mg IV QD

## 2023-11-14 NOTE — PROGRESS NOTE ADULT - SUBJECTIVE AND OBJECTIVE BOX
Interventional Cardiology Post Cath Progress Note:                Subjective:   Patient feels well- no current complaints- Denies chest pain, shortness of breath. Denies pain, numbness, tingling or swelling around (groin) access site    Tele 24hrs: SR occasional couplets      MEDICATIONS  (STANDING):  aMIOdarone    Tablet   Oral   aMIOdarone    Tablet 400 milliGRAM(s) Oral every 8 hours  aspirin enteric coated 81 milliGRAM(s) Oral daily  atorvastatin 80 milliGRAM(s) Oral at bedtime  chlorhexidine 2% Cloths 1 Application(s) Topical <User Schedule>  dapagliflozin 10 milliGRAM(s) Oral every 24 hours  hydrALAZINE 75 milliGRAM(s) Oral every 8 hours  insulin glargine Injectable (LANTUS) 15 Unit(s) SubCutaneous at bedtime  insulin lispro (ADMELOG) corrective regimen sliding scale   SubCutaneous three times a day before meals  insulin lispro (ADMELOG) corrective regimen sliding scale   SubCutaneous at bedtime  insulin lispro Injectable (ADMELOG) 6 Unit(s) SubCutaneous three times a day before meals  isosorbide   dinitrate Tablet (ISORDIL) 30 milliGRAM(s) Oral three times a day  metoprolol succinate ER 25 milliGRAM(s) Oral daily  pantoprazole    Tablet 40 milliGRAM(s) Oral before breakfast  senna 2 Tablet(s) Oral at bedtime  tamsulosin 0.4 milliGRAM(s) Oral at bedtime    MEDICATIONS  (PRN):  polyethylene glycol 3350 17 Gram(s) Oral daily PRN Constipation      Objective:  Vital Signs Last 24 Hrs  T(C): 37.1 (14 Nov 2023 04:29), Max: 37.1 (13 Nov 2023 12:07)  T(F): 98.7 (14 Nov 2023 04:29), Max: 98.8 (13 Nov 2023 12:07)  HR: 72 (14 Nov 2023 04:29) (72 - 81)  BP: 97/66 (14 Nov 2023 04:29) (93/54 - 107/66)  BP(mean): --  RR: 18 (14 Nov 2023 04:29) (16 - 18)  SpO2: 97% (14 Nov 2023 04:29) (95% - 98%)    Parameters below as of 14 Nov 2023 04:29  Patient On (Oxygen Delivery Method): nasal cannula  O2 Flow (L/min): 2      11-13-23 @ 07:01  -  11-14-23 @ 07:00  --------------------------------------------------------  IN: 0 mL / OUT: 400 mL / NET: -400 mL                              11.2   9.81  )-----------( 228      ( 14 Nov 2023 05:41 )             37.7     11-14    138  |  103  |  60<H>  ----------------------------<  209<H>  4.7   |  20<L>  |  3.12<H>    Ca    8.8      14 Nov 2023 05:41  Mg     2.5     11-14      PT/INR - ( 14 Nov 2023 05:41 )   PT: 18.2 sec;   INR: 1.68 ratio         PTT - ( 13 Nov 2023 06:15 )  PTT:29.1 sec  Urinalysis Basic - ( 14 Nov 2023 05:41 )    Color: x / Appearance: x / SG: x / pH: x  Gluc: 209 mg/dL / Ketone: x  / Bili: x / Urobili: x   Blood: x / Protein: x / Nitrite: x   Leuk Esterase: x / RBC: x / WBC x   Sq Epi: x / Non Sq Epi: x / Bacteria: x      Physical Exam:  No apparent distress, alert and oriented times three, appropriate affect  JVD is not elevated, supple  Clear to auscultation with no wheezing, rhonchi or crackles  Regular rate and rhythm with no murmur, rub or gallop  Positive bowel sounds, soft, non-tender, non-distended, no masses/guarding or rebound tenderness  Right Upper Extremity: Soft, non tender, no bleeding or hematoma, clean/dry/intact- RUE +2 palpable radial pulse, -/+bruit  IF groin:  (Right) groin: Soft, non tender, no bleeding or hematoma, clean/dry/intact- RLE/LLE +2 (palpable femoral pulse)  No clubbing, cyanosis, - bruit. ACE wrap in place

## 2023-11-14 NOTE — PROGRESS NOTE ADULT - ASSESSMENT
58M h/o prior CVA (2012) with mild L sided weakness, Mobitz II s/p [employer] dual chamber ICD (12/21/22) DM2, HTN, HLD, admitted in CCU as a transfer from [facility] for NSTEMI on 10/13/23. His hospital course has been complicated by acute systolic dysfunction, LV thrombus, and possible acute stroke, s/p [facility] with triple vessel disease and MRI brain with significant stenosis, initially scheduled for angiogram but now with LAURA

## 2023-11-14 NOTE — PROGRESS NOTE ADULT - SUBJECTIVE AND OBJECTIVE BOX
Neurology        S: patient seen and examined.  no neuro changes.           Medications: MEDICATIONS  (STANDING):  aMIOdarone    Tablet   Oral   aMIOdarone    Tablet 400 milliGRAM(s) Oral every 8 hours  aspirin enteric coated 81 milliGRAM(s) Oral daily  atorvastatin 80 milliGRAM(s) Oral at bedtime  chlorhexidine 2% Cloths 1 Application(s) Topical <User Schedule>  hydrALAZINE 75 milliGRAM(s) Oral every 8 hours  insulin glargine Injectable (LANTUS) 15 Unit(s) SubCutaneous at bedtime  insulin lispro (ADMELOG) corrective regimen sliding scale   SubCutaneous three times a day before meals  insulin lispro (ADMELOG) corrective regimen sliding scale   SubCutaneous at bedtime  insulin lispro Injectable (ADMELOG) 6 Unit(s) SubCutaneous three times a day before meals  isosorbide   dinitrate Tablet (ISORDIL) 30 milliGRAM(s) Oral three times a day  metoprolol succinate ER 25 milliGRAM(s) Oral daily  pantoprazole    Tablet 40 milliGRAM(s) Oral before breakfast  senna 2 Tablet(s) Oral at bedtime  tamsulosin 0.4 milliGRAM(s) Oral at bedtime  warfarin 5 milliGRAM(s) Oral once    MEDICATIONS  (PRN):  polyethylene glycol 3350 17 Gram(s) Oral daily PRN Constipation       Vitals:  Vital Signs Last 24 Hrs  T(C): 36.7 (14 Nov 2023 16:46), Max: 37.1 (14 Nov 2023 04:29)  T(F): 98 (14 Nov 2023 16:46), Max: 98.7 (14 Nov 2023 04:29)  HR: 96 (14 Nov 2023 16:46) (72 - 96)  BP: 89/59 (14 Nov 2023 16:46) (89/59 - 102/63)  BP(mean): --  RR: 18 (14 Nov 2023 16:46) (18 - 18)  SpO2: 98% (14 Nov 2023 16:46) (97% - 98%)    Parameters below as of 14 Nov 2023 16:46  Patient On (Oxygen Delivery Method): nasal cannula  O2 Flow (L/min): 2        General Exam:   General Appearance: Appropriately dressed and in no acute distress       Head: Normocephalic, atraumatic and no dysmorphic features  Ear, Nose, and Throat: Moist mucous membranes  CVS: S1S2+  Resp: No SOB, no wheeze or rhonchi  GI: soft NT/ND  Extremities: No edema or cyanosis  Skin: No bruises or rashes     Neurological Exam:  Mental Status: Awake, alert and oriented x 2.  Able to follow simple verbal commands. Able to name and repeat. fluent speech. No obvious aphasia or dysarthria noted. poor insight. not understanding why he is in hospital   Cranial Nerves: PERRL, EOMI, VFFC, sensation V1-V3 intact,  no obvious facial asymmetry, equal elevation of palate, scm/trap 5/5, tongue is midline on protrusion. no obvious papilledema on fundoscopic exam. hearing is grossly intact.   Motor: Normal bulk, tone and strength throughout. Fine finger movements were intact and symmetric. no tremors or drift noted.    Sensation: Intact to light touch and pinprick throughout. no right/left confusion. no extinction to tactile on DSS. Romberg was negative.   Reflexes: 1+ throughout at biceps, brachioradialis, triceps, patellars and ankles bilaterally and equal. No clonus. R toe and L toe were both downgoing.  Coordination: No dysmetria on FNF or HKS  Gait:   no limitations in gait.     Data/Labs/Imaging which I personally reviewed.          LABS:                          11.2   9.81  )-----------( 228      ( 14 Nov 2023 05:41 )             37.7     11-14    138  |  103  |  60<H>  ----------------------------<  209<H>  4.7   |  20<L>  |  3.12<H>    Ca    8.8      14 Nov 2023 05:41  Mg     2.5     11-14        PT/INR - ( 14 Nov 2023 05:41 )   PT: 18.2 sec;   INR: 1.68 ratio         PTT - ( 13 Nov 2023 06:15 )  PTT:29.1 sec  Urinalysis Basic - ( 14 Nov 2023 05:41 )    Color: x / Appearance: x / SG: x / pH: x  Gluc: 209 mg/dL / Ketone: x  / Bili: x / Urobili: x   Blood: x / Protein: x / Nitrite: x   Leuk Esterase: x / RBC: x / WBC x   Sq Epi: x / Non Sq Epi: x / Bacteria: x           < from: CT Head No Cont (10.27.23 @ 18:04) >    ACC: [id number] EXAM:  CT BRAIN   ORDERED BY: BRETT HOPSON     PROCEDURE DATE:  10/27/2023          INTERPRETATION:  Clinical Indication: CVA    5mm axial sections of the brain were obtained from base to vertex,   without the intravenous administration of contrast material. Coronal and   sagittal computer generated reconstructed views are available.    No prior brain imaging is available for comparison.          The ventricles and sulci are prominent consistent with mild atrophy.   Small vesselwhite matter ischemic changes are noted. There is a infarct   in the right medial cerebellum of undetermined age which could be acute   to subacute based on its degree of lucency. There is no significant   hemorrhage. Bone window examination is unremarkable. There is been   previous right-sided lens replacement surgery.      IMPRESSION: Atrophy and small vessel white matter ischemic changes. Right   medial cerebellar infarct may be acute versus subacute. No hemorrhage.      --- End of Report ---    < from: MR Angio Head No Cont (10.30.23 @ 20:58) >    ACC: [id number] EXAM:  MR ANGIO BRAIN   ORDERED BY: NATALIE CARRANZA     ACC: [id number] EXAM:  MR ANGIO NECK   ORDERED BY: NATALIE CARRANZA     ACC: [id number] EXAM:  MR BRAIN   ORDERED BY: NAVNEET CORONADO     PROCEDURE DATE:  10/30/2023          INTERPRETATION:  .    CLINICAL INFORMATION: Stroke.    TECHNIQUE: Multiplanar multi sequential MRI examination of the brain was   performed without the administration of IV gadolinium. MRA images through   the neck and [place] were obtained usinga combination of 2-D   and 3-D time-of-flight acquisition. The data was then reformatted into a   volumetric data set and reviewed as rotational MIP images.    COMPARISON: Most recent prior CT examination of the head from 10/27/2023.   No prior MRI studies are available for comparison.    FINDINGS:    MRI Brain: A wedge-shaped area of diffusion restriction is seen within   the right PICA territory. Associated T2/FLAIR hyperintense signal is also   seen, compatible with cytotoxic edema. Mild hemorrhagic transformation is   seen within the infarct bed manifested by high signal on T1-weighted   imaging as well as susceptibility artifact on the SWI sequence. Mild mass   effect is seen without effacement of the fourth ventricle or shift of the   posterior fossa midline structures.    This is superimposed upon encephalomalacia and gliosis involving the   right cerebellar hemisphere.    An additional vague area of diffusion reduction is seen within the left   parietal periventricular white matter without associated T2/FLAIR   hyperintense signal, compatible with cytotoxic edema. No gross   hemorrhagic transformation is noted in this location.    Scattered foci of susceptibility artifact are seen within the bilateral   basal ganglia, compatible with areas of chronic microhemorrhage.    Multiple additional patchy confluent nonspecific T2/FLAIR hyperintense   signal changes are noted throughout the bihemispheric white matter   without associated mass effect or restricted diffusion.    Ventricular size and configuration is unremarkable. No abnormal   extra-axial fluid collections are seen. Flow-voids are noted throughout   the major intracranial vessels, on the T2 weighted images, consistent   with their patency. The sellar location appears unremarkable. There is an   unchanged appearing small retrocerebellar arachnoid cyst versus cisterna   magna.    A polyp versus retention cyst is seen within the right maxillary sinus.   Additional minor scattered mucosal thickening seen throughout the ethmoid   labyrinth. The tympanomastoid cavities are clear. The left orbit appears   within normal limits. There is evidence of right-sided cataract removal.    There is an indeterminate mixed signal ovoid shaped soft tissue focus   involving the left superior pinna measuring 1.4 x 0.9 cm which appears   unchanged.    MRA Neck: Examination is motion limited.    There is a standard anatomic variation to the aortic arch. The origins of   the great vessels appear grossly unremarkable.    The bilateral common carotid arteries, carotid bifurcations and cervical   internal carotid arteries appear patent. The origin of the left vertebral   artery is normal. The left vertebral artery is patent.    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    MRA [place]: Atherosclerosis affects the bilateral carotid   siphons with mild area of focal stenosis involving the right   clinoid/supraclinoid junction. There is mild hypoplasia of the right A1   segment. Otherwise, the bilateral intracranial internal carotid,   anterior, and middle cerebral arteries appear unremarkable.    The anterior and bilateral posterior communicating arteries are seen.    The right V4 segment does not demonstrate flow relatedsignal. The left   V4 segment appears unremarkable. The vertebrobasilar confluence appears   unremarkable. Long segment mild stenosis involving the mid basilar   artery. The basilar tip appears unremarkable as well as the bilateral   posterior cerebral arteries.    IMPRESSION:    MRI BRAIN:  1. Evolving acute/subacute right-sided PICA distribution infarction with   associated cytotoxic edema and very mild hemorrhagic transformation.  2. Additional wedge-shaped area of acute/subacute ischemia within the   left parietal periatrial white matter with associated cytotoxic edema.  3. Indeterminate soft tissue lesion involving the left superior pinna   measuring 1.4 x 0.9 cm. Neoplasm cannot be excluded. Recommend   correlation with direct physical examination and visualization.    MRA NECK:  1. Extremely motion limited study.  2. 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 Recommend further evaluation with a CT angiogram study   of the neck.    MRA HEAD:  1. 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 This can be further evaluated with a CT angiogram study of the   head.  2. Mild focal stenosis of the right supraclinoid intracranial internal   carotid artery secondary to atherosclerosis.  3. Otherwise, no large vessel occlusion or major stenosis.    --- End of Report ---      < from: CT Brain Stroke Protocol (11.06.23 @ 14:11) >    ACC: [id number] EXAM:  CT BRAIN STROKE PROTOCOL   ORDERED BY:  KAMILA TAVAREZ     ACC: [id number] EXAM:  CT ANGIO NECK STROKE PROTCL IC   ORDERED BY:  KAMILA TAVAREZ     ACC: [id number] EXAM:  CT ANGIO BRAIN STROKE PROTC IC   ORDERED BY:  KAMILA TAVAREZ     PROCEDURE DATE:  11/06/2023          INTERPRETATION:  Clinical Indication: Code stroke for dizziness, prior   infarct one week ago    5mm axial sections of the brain were obtained from base to vertex,   without the intravenous administration of contrast material. Coronal and   sagittal computer generated reconstructed views are available.    Comparison is made with the prior CT of 10/27/2023 and the MRI 10/30/2023.    There is moderate atrophy for the patient's age with ventricular and   sulcal prominence. Small vessel white matter ischemic changes are noted.   There is an old right medial occipital infarct. No acute hemorrhage is   identified.        After the intravenous power injection of 70 cc of Omnipaque 300 using a   bolus amol timing run serial thin sections were obtained through the   neck from the thoracic inlet through the intracranial circulation   centered at the [id number] on a multislice CT scanner reformatted   with coronal and sagittal 2 D-MIP projections, including 3 D  reconstructions using a separate 3D [employer] software workstation.A total   of  70 cc of Omnipaque were intravenously injected.  30 cc were discarded.    The origins of the common carotid arteries are normal. The origin of the   left dominant left vertebral artery demonstrates moderate stenosis   nondominant small right vertebral artery appears hypoplastic on a   congenital basis and is occluded at the V3 and V4 portion.    There is severe stenosis of the V4 segment of the left vertebral artery   just proximal to the VV junction. The basilar artery is normal. The   posterior cerebral and superior cerebellar arteries are normal.    Evaluation of the carotid arteries demonstrate normal appearance to   distal cervical, petrous, cavernous and supraclinoid internal carotid   arteries. The anterior cerebral arteries and anterior communicating   artery are visualized, the right A1 segment is hypoplastic on a   congenital basis. The middle cerebral arteries are visualized, the left   M1 segment is moderately narrowed but patent. The middle cerebral artery   vessels in the sylvian fissure unremarkable.      The normal intracranial venous circulation is identified. The right   transverse sinus is dominant. The superior sagittal sinus, internal   cerebral veins, vein of Jeremías, straight sinus, transverse sinuses,   sigmoid sinuses and internal jugular veins are normal.  Cortical veins are normal.      There are large bilateral pleural effusions and consolidation in the left   upper lobe whichis similar to a prior exam of 11/5/2023. There is a   left-sided permanent pacemaker.    IMPRESSION: Atrophy for the patient's age. Old right cerebellar infarct.   No hemorrhage. No change since 10/30/2023.    Severe stenosis of the dominant left vertebral artery at the V4 segment   and moderate stenosis at the origin. 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 Moderate left M1 stenosis.    --- End of Report ---   < from: MR Angio Neck No Cont (11.10.23 @ 15:32) >    ACC: [id number] EXAM:  MR ANGIO NECK   ORDERED BY: HERSON ANNE     ACC: [id number] EXAM:  MR BRAIN   ORDERED BY: NATALIE CARRANZA     ACC: [id number] EXAM:  MR ANGIO BRAIN   ORDERED BY: HERSON ANNE     PROCEDURE DATE:  11/10/2023          INTERPRETATION:  CLINICAL INDICATION: CT demonstrating severe stenosis of   the dominant left vertebral artery at the V4 segment and moderate   stenosis at the origin. Occlusion of the nondominant right vertebral   artery at the V3 -4 segment. Moderate left [id number].        Magnetic resonance imaging of the brain was carried out with transaxial   SPGR, FLAIR, fast spin echo T2 weighted images, axial susceptibility   weighted series, diffusion weighted series and sagittal T1 weighted   series on a 1.5 Crystal magnet.    Comparison is made with the prior CT/CTA 11/6/2023.      There is moderate atrophy for the patient's age. An old right medial   cerebellar infarct with hemosiderin staining is identified. Small vessel   white matter ischemic changes arenoted. There is a patchy area of   diffusion restriction in the left subdural white matter. Scattered foci   of hemosiderin deposition are identified in the basal ganglia bilaterally   and left parietal region as well as the old right cerebellar infarct.    A 2 D and 3-D axial noncontrast MRA were performed on the cervical and   intracranial vessels, respectively. Intravascular flow quantification was   performed using gated 2D phase contrast MR, imaged perpendicular to the   vessel axis.  Imageswere post processed NOVA software and a NOVA flow   study report is available      There is occlusion of the distal right nondominant vertebral artery.   There is severe stenosis of the V4 segment of the dominant left vertebral   artery. There is moderate left M1 stenosis.    Flow is as follows in ml per minute.    [id number], OMER 152, RMCA 180, RACA 19, RACA2 107    LCCA 276, LICA 193, LMCA 58, LACA 125, LACA2 68    RVA 8, LVA 93, BA 99, RPCA 32, LPCA 61    IMPRESSION: Old right medial cerebellar infarct with hemosiderin   staining. Small vessel white matter ischemic changes. Left occipital   white matter infarct with diffusion restriction. Scattered foci of   hemosiderin deposition in the basal ganglia and left parietal region.    Occluded nondominant right vertebral artery. There is stenosis V4 segment   dominant left vertebral artery. Significant left M1 stenosis using   noninvasive flow MR angiography.    --- End of Report ---            ANDREW TONEY MD; Attending Radiologist  This document has been electronically signed. Nov 10 2023  4:04PM    < end of copied text >

## 2023-11-14 NOTE — PROGRESS NOTE ADULT - NS ATTEND AMEND GEN_ALL_CORE FT
Patient was found in bed in NAD. He was given one dose of Bumex iv + HS yesterday; I/Os not accurately measured and weight not taken to assess response. His creat today is 3.12 from 3.13. He remains overloaded on POCUS assessment.       Continue diuresis with Bumex 3 mg iv + hypertonic saline   Strict I/Os   Daily standing weight  Continue hydralazine/ISDN and low dose BB   Continue A/C for LV thrombus   Get iron profile   PT follow up /OOB to chair   Neurology follow up for vertebra artery stenosis

## 2023-11-15 LAB
ANION GAP SERPL CALC-SCNC: 15 MMOL/L — SIGNIFICANT CHANGE UP (ref 5–17)
ANION GAP SERPL CALC-SCNC: 15 MMOL/L — SIGNIFICANT CHANGE UP (ref 5–17)
BUN SERPL-MCNC: 61 MG/DL — HIGH (ref 7–23)
BUN SERPL-MCNC: 61 MG/DL — HIGH (ref 7–23)
CALCIUM SERPL-MCNC: 8.5 MG/DL — SIGNIFICANT CHANGE UP (ref 8.4–10.5)
CALCIUM SERPL-MCNC: 8.5 MG/DL — SIGNIFICANT CHANGE UP (ref 8.4–10.5)
CHLORIDE SERPL-SCNC: 103 MMOL/L — SIGNIFICANT CHANGE UP (ref 96–108)
CHLORIDE SERPL-SCNC: 103 MMOL/L — SIGNIFICANT CHANGE UP (ref 96–108)
CO2 SERPL-SCNC: 21 MMOL/L — LOW (ref 22–31)
CO2 SERPL-SCNC: 21 MMOL/L — LOW (ref 22–31)
CREAT SERPL-MCNC: 3.19 MG/DL — HIGH (ref 0.5–1.3)
CREAT SERPL-MCNC: 3.19 MG/DL — HIGH (ref 0.5–1.3)
EGFR: 22 ML/MIN/1.73M2 — LOW
EGFR: 22 ML/MIN/1.73M2 — LOW
FERRITIN SERPL-MCNC: 72 NG/ML — SIGNIFICANT CHANGE UP (ref 30–400)
FERRITIN SERPL-MCNC: 72 NG/ML — SIGNIFICANT CHANGE UP (ref 30–400)
GLUCOSE BLDC GLUCOMTR-MCNC: 147 MG/DL — HIGH (ref 70–99)
GLUCOSE BLDC GLUCOMTR-MCNC: 147 MG/DL — HIGH (ref 70–99)
GLUCOSE BLDC GLUCOMTR-MCNC: 171 MG/DL — HIGH (ref 70–99)
GLUCOSE BLDC GLUCOMTR-MCNC: 171 MG/DL — HIGH (ref 70–99)
GLUCOSE BLDC GLUCOMTR-MCNC: 233 MG/DL — HIGH (ref 70–99)
GLUCOSE BLDC GLUCOMTR-MCNC: 233 MG/DL — HIGH (ref 70–99)
GLUCOSE BLDC GLUCOMTR-MCNC: 249 MG/DL — HIGH (ref 70–99)
GLUCOSE BLDC GLUCOMTR-MCNC: 249 MG/DL — HIGH (ref 70–99)
GLUCOSE SERPL-MCNC: 146 MG/DL — HIGH (ref 70–99)
GLUCOSE SERPL-MCNC: 146 MG/DL — HIGH (ref 70–99)
INR BLD: 1.93 RATIO — HIGH (ref 0.85–1.18)
INR BLD: 1.93 RATIO — HIGH (ref 0.85–1.18)
IRON SATN MFR SERPL: 22 UG/DL — LOW (ref 45–165)
IRON SATN MFR SERPL: 22 UG/DL — LOW (ref 45–165)
IRON SATN MFR SERPL: 8 % — LOW (ref 16–55)
IRON SATN MFR SERPL: 8 % — LOW (ref 16–55)
MAGNESIUM SERPL-MCNC: 2.6 MG/DL — SIGNIFICANT CHANGE UP (ref 1.6–2.6)
MAGNESIUM SERPL-MCNC: 2.6 MG/DL — SIGNIFICANT CHANGE UP (ref 1.6–2.6)
POTASSIUM SERPL-MCNC: 4.6 MMOL/L — SIGNIFICANT CHANGE UP (ref 3.5–5.3)
POTASSIUM SERPL-MCNC: 4.6 MMOL/L — SIGNIFICANT CHANGE UP (ref 3.5–5.3)
POTASSIUM SERPL-SCNC: 4.6 MMOL/L — SIGNIFICANT CHANGE UP (ref 3.5–5.3)
POTASSIUM SERPL-SCNC: 4.6 MMOL/L — SIGNIFICANT CHANGE UP (ref 3.5–5.3)
PROTHROM AB SERPL-ACNC: 19.9 SEC — HIGH (ref 9.5–13)
PROTHROM AB SERPL-ACNC: 19.9 SEC — HIGH (ref 9.5–13)
SARS-COV-2 RNA SPEC QL NAA+PROBE: SIGNIFICANT CHANGE UP
SARS-COV-2 RNA SPEC QL NAA+PROBE: SIGNIFICANT CHANGE UP
SODIUM SERPL-SCNC: 139 MMOL/L — SIGNIFICANT CHANGE UP (ref 135–145)
SODIUM SERPL-SCNC: 139 MMOL/L — SIGNIFICANT CHANGE UP (ref 135–145)
TIBC SERPL-MCNC: 255 UG/DL — SIGNIFICANT CHANGE UP (ref 220–430)
TIBC SERPL-MCNC: 255 UG/DL — SIGNIFICANT CHANGE UP (ref 220–430)
UIBC SERPL-MCNC: 233 UG/DL — SIGNIFICANT CHANGE UP (ref 110–370)
UIBC SERPL-MCNC: 233 UG/DL — SIGNIFICANT CHANGE UP (ref 110–370)

## 2023-11-15 PROCEDURE — 99232 SBSQ HOSP IP/OBS MODERATE 35: CPT

## 2023-11-15 PROCEDURE — 99233 SBSQ HOSP IP/OBS HIGH 50: CPT

## 2023-11-15 RX ORDER — BUMETANIDE 0.25 MG/ML
3 INJECTION INTRAMUSCULAR; INTRAVENOUS ONCE
Refills: 0 | Status: COMPLETED | OUTPATIENT
Start: 2023-11-15 | End: 2023-11-15

## 2023-11-15 RX ORDER — SODIUM CHLORIDE 5 G/100ML
150 INJECTION, SOLUTION INTRAVENOUS ONCE
Refills: 0 | Status: COMPLETED | OUTPATIENT
Start: 2023-11-15 | End: 2023-11-15

## 2023-11-15 RX ADMIN — Medication 6 UNIT(S): at 12:23

## 2023-11-15 RX ADMIN — ATORVASTATIN CALCIUM 80 MILLIGRAM(S): 80 TABLET, FILM COATED ORAL at 22:31

## 2023-11-15 RX ADMIN — Medication 75 MILLIGRAM(S): at 12:25

## 2023-11-15 RX ADMIN — Medication 81 MILLIGRAM(S): at 12:25

## 2023-11-15 RX ADMIN — Medication 2: at 17:38

## 2023-11-15 RX ADMIN — AMIODARONE HYDROCHLORIDE 400 MILLIGRAM(S): 400 TABLET ORAL at 05:11

## 2023-11-15 RX ADMIN — INSULIN GLARGINE 15 UNIT(S): 100 INJECTION, SOLUTION SUBCUTANEOUS at 22:30

## 2023-11-15 RX ADMIN — PANTOPRAZOLE SODIUM 40 MILLIGRAM(S): 20 TABLET, DELAYED RELEASE ORAL at 05:12

## 2023-11-15 RX ADMIN — ISOSORBIDE DINITRATE 30 MILLIGRAM(S): 5 TABLET ORAL at 05:11

## 2023-11-15 RX ADMIN — SODIUM CHLORIDE 300 MILLILITER(S): 5 INJECTION, SOLUTION INTRAVENOUS at 12:24

## 2023-11-15 RX ADMIN — Medication 75 MILLIGRAM(S): at 05:11

## 2023-11-15 RX ADMIN — AMIODARONE HYDROCHLORIDE 400 MILLIGRAM(S): 400 TABLET ORAL at 12:25

## 2023-11-15 RX ADMIN — BUMETANIDE 124 MILLIGRAM(S): 0.25 INJECTION INTRAMUSCULAR; INTRAVENOUS at 08:58

## 2023-11-15 RX ADMIN — CHLORHEXIDINE GLUCONATE 1 APPLICATION(S): 213 SOLUTION TOPICAL at 05:12

## 2023-11-15 RX ADMIN — Medication 25 MILLIGRAM(S): at 05:11

## 2023-11-15 RX ADMIN — ISOSORBIDE DINITRATE 30 MILLIGRAM(S): 5 TABLET ORAL at 17:39

## 2023-11-15 RX ADMIN — AMIODARONE HYDROCHLORIDE 400 MILLIGRAM(S): 400 TABLET ORAL at 22:31

## 2023-11-15 RX ADMIN — Medication 1: at 07:59

## 2023-11-15 RX ADMIN — Medication 75 MILLIGRAM(S): at 22:31

## 2023-11-15 RX ADMIN — Medication 6 UNIT(S): at 17:39

## 2023-11-15 RX ADMIN — TAMSULOSIN HYDROCHLORIDE 0.4 MILLIGRAM(S): 0.4 CAPSULE ORAL at 22:31

## 2023-11-15 RX ADMIN — ISOSORBIDE DINITRATE 30 MILLIGRAM(S): 5 TABLET ORAL at 12:24

## 2023-11-15 RX ADMIN — Medication 6 UNIT(S): at 07:59

## 2023-11-15 RX ADMIN — SENNA PLUS 2 TABLET(S): 8.6 TABLET ORAL at 22:31

## 2023-11-15 NOTE — PROGRESS NOTE ADULT - PROBLEM SELECTOR PLAN 1
- Etiology: ischemic with LVEF <20%  - Bedside IVC dilated at 2 cm. Bumex 3 mg IV x1 and 3% Hypertonic saline 150 cc over 30 mins given today  - Continue Toprol XL 25 mg QD  - Continue HDZN 75 mg TID and ISDN 30 mg TID, hold for SBP <90  - Farxiga stopped by nephrology 11/14 for concern of ongoing LAURA   - Document daily standing weights  - Please engage PT to prevent deconditioning  - Advanced therapies: holding off on launching VAD/transplant eval given cognitive issues however will continue to reassess. Of note he has good support and no clear psychosocial red flags. ABO AB.

## 2023-11-15 NOTE — PROGRESS NOTE ADULT - NS ATTEND AMEND GEN_ALL_CORE FT
Patient found in bed in NAD, he reports feeling well. I/Os not well documented and standing weight not done. His renal function remains table. He is overloaded on exam. IVC is 2 cm with <50% collapsibility. Neuro deferred cerebral angio at the moment due to LAURA with creatinine up to 3.1 gr/dl.       Continue Bumex 3 mg iv + hypertonic saline daily   Close monitoring of renal function   Continue hydralazine and ISDN for afterload reduction   Low dose BB   Please get daily standing weight and strict I/Os   PT/OT evaluation/ OOB to chair  Get iron profile   Continue coumadin for LV thrombus   Discussed with primary team at bedside

## 2023-11-15 NOTE — PROGRESS NOTE ADULT - SUBJECTIVE AND OBJECTIVE BOX
Bernarda Reed MD  Division of Hospital Medicine  Please contact via MS Teams (prefer message first)  Office: [phone]    Patient is a 58y old  Male who presents with a chief complaint of NSTEMI (14 Nov 2023 14:00)      SUBJECTIVE / OVERNIGHT EVENTS:  no acute events overnight, vss, afebrile  pt pleasant and has no complaints at this time    ROS:  14 point ROS negative in detail except stated as above    MEDICATIONS  (STANDING):  aMIOdarone    Tablet   Oral   aMIOdarone    Tablet 400 milliGRAM(s) Oral every 8 hours  aspirin enteric coated 81 milliGRAM(s) Oral daily  atorvastatin 80 milliGRAM(s) Oral at bedtime  chlorhexidine 2% Cloths 1 Application(s) Topical <User Schedule>  hydrALAZINE 75 milliGRAM(s) Oral every 8 hours  insulin glargine Injectable (LANTUS) 15 Unit(s) SubCutaneous at bedtime  insulin lispro (ADMELOG) corrective regimen sliding scale   SubCutaneous three times a day before meals  insulin lispro (ADMELOG) corrective regimen sliding scale   SubCutaneous at bedtime  insulin lispro Injectable (ADMELOG) 6 Unit(s) SubCutaneous three times a day before meals  isosorbide   dinitrate Tablet (ISORDIL) 30 milliGRAM(s) Oral three times a day  metoprolol succinate ER 25 milliGRAM(s) Oral daily  pantoprazole    Tablet 40 milliGRAM(s) Oral before breakfast  senna 2 Tablet(s) Oral at bedtime  sodium chloride 3% Bolus 150 milliLiter(s) IV Bolus once  tamsulosin 0.4 milliGRAM(s) Oral at bedtime    MEDICATIONS  (PRN):  polyethylene glycol 3350 17 Gram(s) Oral daily PRN Constipation      CAPILLARY BLOOD GLUCOSE      POCT Blood Glucose.: 171 mg/dL (15 Nov 2023 07:46)  POCT Blood Glucose.: 242 mg/dL (14 Nov 2023 21:11)  POCT Blood Glucose.: 163 mg/dL (14 Nov 2023 17:15)  POCT Blood Glucose.: 124 mg/dL (14 Nov 2023 12:00)    I&O's Summary    14 Nov 2023 07:01  -  15 Nov 2023 07:00  --------------------------------------------------------  IN: 480 mL / OUT: 450 mL / NET: 30 mL    15 Nov 2023 07:01  -  15 Nov 2023 11:17  --------------------------------------------------------  IN: 320 mL / OUT: 0 mL / NET: 320 mL        PHYSICAL EXAM:  Vital Signs Last 24 Hrs  T(C): 36.8 (15 Nov 2023 04:33), Max: 36.8 (15 Nov 2023 00:19)  T(F): 98.2 (15 Nov 2023 04:33), Max: 98.2 (15 Nov 2023 00:19)  HR: 79 (15 Nov 2023 04:33) (73 - 96)  BP: 95/61 (15 Nov 2023 04:33) (89/59 - 102/63)  BP(mean): --  RR: 18 (15 Nov 2023 04:33) (18 - 18)  SpO2: 96% (15 Nov 2023 04:33) (96% - 99%)    Parameters below as of 15 Nov 2023 04:33  Patient On (Oxygen Delivery Method): nasal cannula  O2 Flow (L/min): 2    GENERAL: NAD, well-developed  HEAD:  Atraumatic, Normocephalic  EYES: EOMI, PERRLA, conjunctiva and sclera clear  NECK: Supple, No JVD  CHEST/LUNG: Clear to auscultation bilaterally; No wheeze  HEART: Regular rate and rhythm; No murmurs, rubs, or gallops  ABDOMEN: Soft, Nontender, Nondistended; Bowel sounds present  EXTREMITIES:  2+ Peripheral Pulses, No clubbing, cyanosis, or edema  NEUROLOGY: AAOx2; non-focal  SKIN: No rashes or lesions    LABS:                        11.2   9.81  )-----------( 228      ( 14 Nov 2023 05:41 )             37.7     11-15    139  |  103  |  61<H>  ----------------------------<  146<H>  4.6   |  21<L>  |  3.19<H>    Ca    8.5      15 Nov 2023 06:39  Mg     2.6     11-15      PT/INR - ( 15 Nov 2023 06:38 )   PT: 19.9 sec;   INR: 1.93 ratio               Urinalysis Basic - ( 15 Nov 2023 06:39 )    Color: x / Appearance: x / SG: x / pH: x  Gluc: 146 mg/dL / Ketone: x  / Bili: x / Urobili: x   Blood: x / Protein: x / Nitrite: x   Leuk Esterase: x / RBC: x / WBC x   Sq Epi: x / Non Sq Epi: x / Bacteria: x        RADIOLOGY & ADDITIONAL TESTS:    Imaging Personally Reviewed:    Consultant(s) Notes Reviewed:  nephro, HF    Care Discussed with Consultants/Other Providers: Dr. Pickett (nephro)

## 2023-11-15 NOTE — PROGRESS NOTE ADULT - PROBLEM SELECTOR PLAN 3
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 Status post [facility] [place]-> 95% discrete proximal LAD disease and sequential multifocal disease with good distal target, 80-90% proximal LCx disease, severe multifocal RCA. Limited viability on MRI. Echocardiogram with LVEF <20%, rWMA & LV thrombus  - HF team & EP card f/u plans appreciated, continue with GDMT (Metoprolol ER 25mg/d, ARB/ARNI- none due to LAURA, No MRA, eventual SGLT2i)  - Hydral 75mg tid and ISDN 30mg TID  - Inotropes: Off milrinone since 11/1  - Advanced therapies- holding off on launching VAD/transplant eval given cognitive issues   - No longer a PCI candidate due to contraindications for ECMO, which likely would be required as per EP note. Will reassess following completion of amiodarone load.    - daily weight and I/O  - resumed bumex 2mg IV QD

## 2023-11-15 NOTE — PROGRESS NOTE ADULT - ASSESSMENT
58M h/o prior CVA (2012) with mild L sided weakness, Mobitz II s/p Medtronic dual chamber ICD (12/21/22) DM2, HTN, HLD, admitted in CCU as a transfer from [facility] for NSTEMI on 10/13/23. His hospital course has been complicated by acute systolic dysfunction, LV thrombus, and possible acute stroke, s/p [facility] with triple vessel disease and MRI brain with significant stenosis, initially scheduled for angiogram but now with LAURA

## 2023-11-15 NOTE — PROGRESS NOTE ADULT - SUBJECTIVE AND OBJECTIVE BOX
Subjective:  - Has yet to be OOB yet. No orthopnea or PND    Medications:  aMIOdarone    Tablet   Oral   aMIOdarone    Tablet 400 milliGRAM(s) Oral every 8 hours  aspirin enteric coated 81 milliGRAM(s) Oral daily  atorvastatin 80 milliGRAM(s) Oral at bedtime  chlorhexidine 2% Cloths 1 Application(s) Topical <User Schedule>  hydrALAZINE 75 milliGRAM(s) Oral every 8 hours  insulin glargine Injectable (LANTUS) 15 Unit(s) SubCutaneous at bedtime  insulin lispro (ADMELOG) corrective regimen sliding scale   SubCutaneous three times a day before meals  insulin lispro (ADMELOG) corrective regimen sliding scale   SubCutaneous at bedtime  insulin lispro Injectable (ADMELOG) 6 Unit(s) SubCutaneous three times a day before meals  isosorbide   dinitrate Tablet (ISORDIL) 30 milliGRAM(s) Oral three times a day  metoprolol succinate ER 25 milliGRAM(s) Oral daily  pantoprazole    Tablet 40 milliGRAM(s) Oral before breakfast  polyethylene glycol 3350 17 Gram(s) Oral daily PRN  senna 2 Tablet(s) Oral at bedtime  tamsulosin 0.4 milliGRAM(s) Oral at bedtime    Physical Exam:    Vitals:  Vital Signs Last 24 Hours  T(C): 36.7 (11-15-23 @ 12:13), Max: 36.8 (11-15-23 @ 00:19)  HR: 72 (11-15-23 @ 12:13) (72 - 96)  BP: 93/56 (11-15-23 @ 12:13) (89/59 - 99/64)  RR: 18 (11-15-23 @ 12:13) (18 - 18)  SpO2: 97% (11-15-23 @ 12:13) (96% - 99%)    I&O's Summary    14 Nov 2023 07:01  -  15 Nov 2023 07:00  --------------------------------------------------------  IN: 480 mL / OUT: 450 mL / NET: 30 mL    15 Nov 2023 07:01  -  15 Nov 2023 14:09  --------------------------------------------------------  IN: 650 mL / OUT: 0 mL / NET: 650 mL    Tele: SR 70s    General: No distress. Comfortable.  HEENT: EOM intact.  Neck: Neck supple. JVP 14-16 cm H2O  Chest: Clear to auscultation bilaterally  CV: Normal S1 and S2. Radial pulses normal.  Abdomen: Soft, non-distended, non-tender  Extremities: Warm peripherally No edema  Neurology: Alert and oriented times three. Sensation intact  Psych: Affect normal    Labs:                        11.2   9.81  )-----------( 228      ( 14 Nov 2023 05:41 )             37.7     11-15    139  |  103  |  61<H>  ----------------------------<  146<H>  4.6   |  21<L>  |  3.19<H>    Ca    8.5      15 Nov 2023 06:39  Mg     2.6     11-15      PT/INR - ( 15 Nov 2023 06:38 )   PT: 19.9 sec;   INR: 1.93 ratio

## 2023-11-15 NOTE — PROGRESS NOTE ADULT - ASSESSMENT
59 YO M with history of CVA with residual mild R paresthesias, second degree Mobitz II heart block s/p DC-ICD 12/2022 (initial concern for sarcoid but negative biopsy/PET post procedure), DM2 (A1c 8.4%), and HTN who was admitted to [facility] with chest pain and dyspnea, found to have NSTEMI with markedly elevated troponins and new severe LV dysfunction with regional wall motion abnormalities as well as + enterovirus. He required NIPPV and was diuresed before being transferred to [facility] where LHC performed which revealed severe 3v CAD with critical proximal LAD involvement. CTS was consulted for CABG evaluation and HF consulted for comanagement. Given concern for low flow status sent back to CCU 10/28. Found to have LV thrombus. AMS prompting CT head with angio on 11/6 revealing R medial cerebellar infarct. MRI showing evolving PICA stroke and severe stenosis of L vertebral artery and occlusion o nondominant R vertebral artery. Given evolving stroke, brain lesion and ongoing confusion, not a candidate for coronary intervention. Additionally, per CTS review, no LAD viability. on 11/8, exhibited sustained MMVT that fell into monitoring zone of ICD that spontaneously terminated. Initiated on PO Amio load. Tolerated wean off Milrinone 11/1 with escalation of vasodilators.     He underwent RHC/CardioMEMS [id number] for longitudinal management, hemodynamics revealing elevated filling pressures and borderline CI. Will aggressively diurese and follow persistent LAURA.       Cardiac Studies  ·	[facility] w/ CardioMEMS: RA 14 (v to 16), PA 46/24/32, PCWP 25 (v to 30), %, PA 56.8%, CO/CI (F) 4.2/2.19, /65 (86)  ·	TTE limited 11/8/23: LVEF <20%, compared to prior LV thrombus much smaller  ·	TTE 10/26/23: EF <20% LVT present, small pericardial effusion w/o tamponade  ·	RHC 10/19/23: RA 10, RV 47/9/13, Wedge 29, PA 41/26/33, CO/CI 4.4/2.3, PA sat 57.3  ·	CMR 10/18/23: There is greater than 50% thickness subendocardial LGE within the mid anteroseptum, anterior and anterolateral segments and apical segments  ·	TTE 10/16/23: LV 5.5 cm, LVEF 20% with regional WMAs worse in the apex, LVOT VTI 8 cm, normal RV size/function, severe functional MR, small pericardial effusion, estimated RA pressure 8 mmHg   ·	LHC 10/16/23: 95% discrete proximal LAD disease and sequential multifocal disease with good distal target, 80-90% proximal LCx disease just prior to marginals, severe multifocal RCA disease with good targets   ·	TTE 12/2022: normal LVEF     Hemodynamics  10/30 RHC: RHC: RA 14/13/12, RV 51/14, PA 47/34/39, PCW 34/36/32, CI 2.5/CO4.95   11/1/23 CVP 13, PA 48/23/35, mVO2 62.6%, Cris CI 2.4  10/21/23: RA 13 with V-wave 21, PA 50/33, PCW 33, MvO2 68.2, Cris CO/CI 4.4/2.56

## 2023-11-15 NOTE — PROGRESS NOTE ADULT - SUBJECTIVE AND OBJECTIVE BOX
[facility]   [phone]  RENAL FOLLOW UP NOTE  --------------------------------------------------------------------------------  Chief Complaint:    24 hour events/subjective:    seen earlier  states feels well   denies sob     PAST HISTORY  --------------------------------------------------------------------------------  No significant changes to PMH, PSH, FHx, SHx, unless otherwise noted    ALLERGIES & MEDICATIONS  --------------------------------------------------------------------------------  Allergies    No Known Allergies    Intolerances      Standing Inpatient Medications  aMIOdarone    Tablet 400 milliGRAM(s) Oral every 8 hours  aMIOdarone    Tablet   Oral   aspirin enteric coated 81 milliGRAM(s) Oral daily  atorvastatin 80 milliGRAM(s) Oral at bedtime  chlorhexidine 2% Cloths 1 Application(s) Topical <User Schedule>  hydrALAZINE 75 milliGRAM(s) Oral every 8 hours  insulin glargine Injectable (LANTUS) 15 Unit(s) SubCutaneous at bedtime  insulin lispro (ADMELOG) corrective regimen sliding scale   SubCutaneous three times a day before meals  insulin lispro (ADMELOG) corrective regimen sliding scale   SubCutaneous at bedtime  insulin lispro Injectable (ADMELOG) 6 Unit(s) SubCutaneous three times a day before meals  isosorbide   dinitrate Tablet (ISORDIL) 30 milliGRAM(s) Oral three times a day  metoprolol succinate ER 25 milliGRAM(s) Oral daily  pantoprazole    Tablet 40 milliGRAM(s) Oral before breakfast  senna 2 Tablet(s) Oral at bedtime  tamsulosin 0.4 milliGRAM(s) Oral at bedtime    PRN Inpatient Medications  polyethylene glycol 3350 17 Gram(s) Oral daily PRN      REVIEW OF SYSTEMS  --------------------------------------------------------------------------------    Gen: denies  fevers/chills,  CVS: denies chest pain/palpitations  Resp: denies SOB/Cough  GI: Denies N/V/Abd pain  : Denies dysuria    VITALS/PHYSICAL EXAM  --------------------------------------------------------------------------------  T(C): 36.6 (11-15-23 @ 20:36), Max: 36.8 (11-15-23 @ 00:19)  HR: 68 (11-15-23 @ 20:36) (68 - 79)  	Gen: ill appearing male   	Pulm: decrease bs  no rales or ronchi or wheezing  	CV: +JVD. RRR, S1S2; no rub  	Back: No CVA tenderness; no sacral edema  	Abd: +BS, soft, nontender/nondistended  	: No suprapubic tenderness  	UE: Warm, no cyanosis  no clubbing,  no edema; no asterixis  	LE: Warm, no cyanosis  no clubbing, no edema  	Neuro: alert and oriented. speech coherent BP: 96/60 (11-15-23 @ 20:36) (93/56 - 99/64)      LABS/STUDIES  --------------------------------------------------------------------------------              11.2   9.81  >-----------<  228      [11-14-23 @ 05:41]              37.7     139  |  103  |  61  ----------------------------<  146      [11-15-23 @ 06:39]  4.6   |  21  |  3.19        Ca     8.5     [11-15-23 @ 06:39]      Mg     2.6     [11-15-23 @ 06:39]      PT/INR: PT 19.9 , INR 1.93       [11-15-23 @ 06:38]      Creatinine Trend:  SCr 3.19 [11-15 @ 06:39]  SCr 3.12 [11-14 @ 05:41]  SCr 3.13 [11-13 @ 06:13]  SCr 2.91 [11-12 @ 06:52]  SCr 2.63 [11-11 @ 06:37]              Urinalysis - [11-15-23 @ 06:39]      Color  / Appearance  / SG  / pH       Gluc 146 / Ketone   / Bili  / Urobili        Blood  / Protein  / Leuk Est  / Nitrite       RBC  / WBC  / Hyaline  / Gran  / Sq Epi  / Non Sq Epi  / Bacteria     Urinalysis - [11-15-23 @ 06:39]      Color  / Appearance  / SG  / pH       Gluc 146 / Ketone   / Bili  / Urobili        Blood  / Protein  / Leuk Est  / Nitrite       RBC  / WBC  / Hyaline  / Gran  / Sq Epi  / Non Sq Epi  / Bacteria       Ferritin 72      [11-15-23 @ 06:39]  TSH 0.34      [11-10-23 @ 06:32]  Lipid: chol 167, TG 80, HDL 52, LDL --      [10-17-23 @ 08:16]    Syphilis Screen (Treponema Pallidum Ab) Negative      [10-29-23 @ 00:03]

## 2023-11-15 NOTE — PROGRESS NOTE ADULT - PROBLEM SELECTOR PLAN 1
Scr continue to trend up to 3.1 this morning, baseline 1.28 on Oct 15, 2023  - likely in setting of cardiorenal  - s/p cardiomems with elevated pressure  - start IV bumex 3mg daily with hypertonic saline  - renal US with No hydronephrosis. Increased cortical echogenicity, suggestive of renal parenchymal disease  - appreciate nephro recs: care discussed with Dr. Pickett - agree with holding off angiogram at this time

## 2023-11-15 NOTE — PROGRESS NOTE ADULT - ASSESSMENT
59 yo Male pmhx CVA with mild left sided deficits, HTN, DM2, vertigo, HLD, Mobitz II s/pp Medtronic dual chamber ICD (12/21/22) initially presented to [facility] from home with complaints of dyspnea and chest pain and was admitted w/ acute hypoxic respiratory failure likely due to acute pulmonary edema due to acute HFrEF in setting of acute NSTEMI, LAURA and enterovirus infection. Pt with brief MICU stay at [facility] requiring bipap (no intubation), was treated for PNA with cefepime, and was found to have TTE done 9/26/23 showing EF 20% with severely decreased LVSF, multiple regional wall motion abnormalities, and elevated LV end diastolic pressure. Pt was transferred to [facility] for cardiac evaluation. [facility] performed which revealed severe 3v CAD with critical proximal LAD involvement. CTS was consulted for CA Found to have LV thrombus. AMS prompting CT head with angio on 11/6 revealing R medial cerebellar infarct. MRI showing evolving PICA stroke and severe stenosis of L vertebral artery and occlusion o nondominant R vertebral artery.R cerebellar/PICA infarct as well as L parietal embolic infarct, likely cardioembolic neurosx plans for cerebral angio.    [facility] 10/30  CTA 11/6    1- LAURA   2- CVA  3- cardiomyopathy  4- NSTEMI/3 vessel disease  5- CHF   6- htn     overall LAURA in setting of decompensated CHF requiring diuresis. creatinine had been holding steady ~2.2 (10/30-11/10)  now with worsening LAURA since 11/11 and seems to have stabilizing at this level  last contrast study was 11/6, for CTA. with creatinine rise 11/11, not suspecting contrast related.   farxiga was added 11/10 and with worsening creatinine   neuro planning neuro angio, however now with laura, and will have high risk contrast nephropathy. would hold off on angio, unless it becomes urgent/emergent.  cont to hold farxiga for now   continue diuresis with bumex, for CHF decompensation.   hydralazine 75 mg tid for htn and afterload reduction   strict I/O  trend creatinine and electrolytes closely

## 2023-11-16 DIAGNOSIS — D64.9 ANEMIA, UNSPECIFIED: ICD-10-CM

## 2023-11-16 LAB
ANION GAP SERPL CALC-SCNC: 13 MMOL/L — SIGNIFICANT CHANGE UP (ref 5–17)
ANION GAP SERPL CALC-SCNC: 13 MMOL/L — SIGNIFICANT CHANGE UP (ref 5–17)
BUN SERPL-MCNC: 64 MG/DL — HIGH (ref 7–23)
BUN SERPL-MCNC: 64 MG/DL — HIGH (ref 7–23)
CALCIUM SERPL-MCNC: 8.6 MG/DL — SIGNIFICANT CHANGE UP (ref 8.4–10.5)
CALCIUM SERPL-MCNC: 8.6 MG/DL — SIGNIFICANT CHANGE UP (ref 8.4–10.5)
CHLORIDE SERPL-SCNC: 103 MMOL/L — SIGNIFICANT CHANGE UP (ref 96–108)
CHLORIDE SERPL-SCNC: 103 MMOL/L — SIGNIFICANT CHANGE UP (ref 96–108)
CO2 SERPL-SCNC: 21 MMOL/L — LOW (ref 22–31)
CO2 SERPL-SCNC: 21 MMOL/L — LOW (ref 22–31)
CREAT SERPL-MCNC: 3.28 MG/DL — HIGH (ref 0.5–1.3)
CREAT SERPL-MCNC: 3.28 MG/DL — HIGH (ref 0.5–1.3)
EGFR: 21 ML/MIN/1.73M2 — LOW
EGFR: 21 ML/MIN/1.73M2 — LOW
GLUCOSE BLDC GLUCOMTR-MCNC: 130 MG/DL — HIGH (ref 70–99)
GLUCOSE BLDC GLUCOMTR-MCNC: 130 MG/DL — HIGH (ref 70–99)
GLUCOSE BLDC GLUCOMTR-MCNC: 156 MG/DL — HIGH (ref 70–99)
GLUCOSE BLDC GLUCOMTR-MCNC: 156 MG/DL — HIGH (ref 70–99)
GLUCOSE BLDC GLUCOMTR-MCNC: 160 MG/DL — HIGH (ref 70–99)
GLUCOSE BLDC GLUCOMTR-MCNC: 160 MG/DL — HIGH (ref 70–99)
GLUCOSE BLDC GLUCOMTR-MCNC: 188 MG/DL — HIGH (ref 70–99)
GLUCOSE BLDC GLUCOMTR-MCNC: 188 MG/DL — HIGH (ref 70–99)
GLUCOSE SERPL-MCNC: 152 MG/DL — HIGH (ref 70–99)
GLUCOSE SERPL-MCNC: 152 MG/DL — HIGH (ref 70–99)
MAGNESIUM SERPL-MCNC: 2.5 MG/DL — SIGNIFICANT CHANGE UP (ref 1.6–2.6)
MAGNESIUM SERPL-MCNC: 2.5 MG/DL — SIGNIFICANT CHANGE UP (ref 1.6–2.6)
POTASSIUM SERPL-MCNC: 4.1 MMOL/L — SIGNIFICANT CHANGE UP (ref 3.5–5.3)
POTASSIUM SERPL-MCNC: 4.1 MMOL/L — SIGNIFICANT CHANGE UP (ref 3.5–5.3)
POTASSIUM SERPL-SCNC: 4.1 MMOL/L — SIGNIFICANT CHANGE UP (ref 3.5–5.3)
POTASSIUM SERPL-SCNC: 4.1 MMOL/L — SIGNIFICANT CHANGE UP (ref 3.5–5.3)
SODIUM SERPL-SCNC: 137 MMOL/L — SIGNIFICANT CHANGE UP (ref 135–145)
SODIUM SERPL-SCNC: 137 MMOL/L — SIGNIFICANT CHANGE UP (ref 135–145)

## 2023-11-16 PROCEDURE — 99233 SBSQ HOSP IP/OBS HIGH 50: CPT

## 2023-11-16 RX ORDER — HYDRALAZINE HCL 50 MG
50 TABLET ORAL EVERY 8 HOURS
Refills: 0 | Status: DISCONTINUED | OUTPATIENT
Start: 2023-11-16 | End: 2023-11-21

## 2023-11-16 RX ORDER — ISOSORBIDE DINITRATE 5 MG/1
20 TABLET ORAL THREE TIMES A DAY
Refills: 0 | Status: DISCONTINUED | OUTPATIENT
Start: 2023-11-16 | End: 2023-11-21

## 2023-11-16 RX ADMIN — ISOSORBIDE DINITRATE 30 MILLIGRAM(S): 5 TABLET ORAL at 06:09

## 2023-11-16 RX ADMIN — Medication 1: at 12:03

## 2023-11-16 RX ADMIN — Medication 6 UNIT(S): at 08:08

## 2023-11-16 RX ADMIN — AMIODARONE HYDROCHLORIDE 400 MILLIGRAM(S): 400 TABLET ORAL at 21:28

## 2023-11-16 RX ADMIN — Medication 81 MILLIGRAM(S): at 12:04

## 2023-11-16 RX ADMIN — Medication 1: at 17:23

## 2023-11-16 RX ADMIN — Medication 25 MILLIGRAM(S): at 06:08

## 2023-11-16 RX ADMIN — ATORVASTATIN CALCIUM 80 MILLIGRAM(S): 80 TABLET, FILM COATED ORAL at 21:29

## 2023-11-16 RX ADMIN — ISOSORBIDE DINITRATE 20 MILLIGRAM(S): 5 TABLET ORAL at 17:22

## 2023-11-16 RX ADMIN — Medication 6 UNIT(S): at 17:23

## 2023-11-16 RX ADMIN — CHLORHEXIDINE GLUCONATE 1 APPLICATION(S): 213 SOLUTION TOPICAL at 06:09

## 2023-11-16 RX ADMIN — PANTOPRAZOLE SODIUM 40 MILLIGRAM(S): 20 TABLET, DELAYED RELEASE ORAL at 06:09

## 2023-11-16 RX ADMIN — AMIODARONE HYDROCHLORIDE 400 MILLIGRAM(S): 400 TABLET ORAL at 06:09

## 2023-11-16 RX ADMIN — TAMSULOSIN HYDROCHLORIDE 0.4 MILLIGRAM(S): 0.4 CAPSULE ORAL at 21:30

## 2023-11-16 RX ADMIN — AMIODARONE HYDROCHLORIDE 400 MILLIGRAM(S): 400 TABLET ORAL at 14:08

## 2023-11-16 RX ADMIN — Medication 1: at 08:08

## 2023-11-16 RX ADMIN — Medication 6 UNIT(S): at 12:03

## 2023-11-16 RX ADMIN — Medication 75 MILLIGRAM(S): at 06:09

## 2023-11-16 RX ADMIN — Medication 50 MILLIGRAM(S): at 14:09

## 2023-11-16 RX ADMIN — INSULIN GLARGINE 15 UNIT(S): 100 INJECTION, SOLUTION SUBCUTANEOUS at 21:28

## 2023-11-16 NOTE — PROGRESS NOTE ADULT - PROBLEM SELECTOR PLAN 2
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 Neurology following and consulted neurosurgery, with plans for angio on Monday-Tuesday.   Medically, patient is high risk for low risk procedure, however unclear if further optimization can be performed due to severe chronic cardiac disease.  - given LAURA, hold off for now

## 2023-11-16 NOTE — PROGRESS NOTE ADULT - SUBJECTIVE AND OBJECTIVE BOX
Bernarda Reed MD  Division of Hospital Medicine  Please contact via MS Teams (prefer message first)  Office: [phone]    Patient is a 58y old  Male who presents with a chief complaint of NSTEMI (15 Nov 2023 21:18)      SUBJECTIVE / OVERNIGHT EVENTS:  no acute events overnight, vss, afebrile  pt feels okay   confused time to time and spills his urinals    ROS:  14 point ROS negative in detail except stated as above    MEDICATIONS  (STANDING):  aMIOdarone    Tablet   Oral   aMIOdarone    Tablet 400 milliGRAM(s) Oral every 8 hours  aspirin enteric coated 81 milliGRAM(s) Oral daily  atorvastatin 80 milliGRAM(s) Oral at bedtime  chlorhexidine 2% Cloths 1 Application(s) Topical <User Schedule>  hydrALAZINE 75 milliGRAM(s) Oral every 8 hours  insulin glargine Injectable (LANTUS) 15 Unit(s) SubCutaneous at bedtime  insulin lispro (ADMELOG) corrective regimen sliding scale   SubCutaneous three times a day before meals  insulin lispro (ADMELOG) corrective regimen sliding scale   SubCutaneous at bedtime  insulin lispro Injectable (ADMELOG) 6 Unit(s) SubCutaneous three times a day before meals  isosorbide   dinitrate Tablet (ISORDIL) 30 milliGRAM(s) Oral three times a day  metoprolol succinate ER 25 milliGRAM(s) Oral daily  pantoprazole    Tablet 40 milliGRAM(s) Oral before breakfast  senna 2 Tablet(s) Oral at bedtime  tamsulosin 0.4 milliGRAM(s) Oral at bedtime    MEDICATIONS  (PRN):  polyethylene glycol 3350 17 Gram(s) Oral daily PRN Constipation      CAPILLARY BLOOD GLUCOSE      POCT Blood Glucose.: 156 mg/dL (16 Nov 2023 07:47)  POCT Blood Glucose.: 249 mg/dL (15 Nov 2023 21:34)  POCT Blood Glucose.: 233 mg/dL (15 Nov 2023 17:14)  POCT Blood Glucose.: 147 mg/dL (15 Nov 2023 11:59)    I&O's Summary    15 Nov 2023 07:01  -  16 Nov 2023 07:00  --------------------------------------------------------  IN: 1010 mL / OUT: 350 mL / NET: 660 mL    16 Nov 2023 07:01  -  16 Nov 2023 11:13  --------------------------------------------------------  IN: 0 mL / OUT: 150 mL / NET: -150 mL        PHYSICAL EXAM:  Vital Signs Last 24 Hrs  T(C): 36.7 (16 Nov 2023 05:55), Max: 36.7 (15 Nov 2023 12:13)  T(F): 98 (16 Nov 2023 05:55), Max: 98 (15 Nov 2023 12:13)  HR: 74 (16 Nov 2023 05:55) (68 - 74)  BP: 102/63 (16 Nov 2023 05:55) (93/56 - 102/63)  BP(mean): --  RR: 18 (16 Nov 2023 05:55) (18 - 18)  SpO2: 97% (16 Nov 2023 05:55) (97% - 99%)    Parameters below as of 16 Nov 2023 05:55  Patient On (Oxygen Delivery Method): nasal cannula  O2 Flow (L/min): 2    GENERAL: NAD, well-developed  HEAD:  Atraumatic, Normocephalic  EYES: EOMI, PERRLA, conjunctiva and sclera clear  NECK: Supple, (+) JVD  CHEST/LUNG: Clear to auscultation bilaterally; No wheeze  HEART: Regular rate and rhythm; No murmurs, rubs, or gallops  ABDOMEN: Soft, Nontender, Nondistended; Bowel sounds present  EXTREMITIES:  2+ Peripheral Pulses, No clubbing, cyanosis, or edema  NEUROLOGY: AAOx3; non-focal  SKIN: No rashes or lesions    LABS:  personally reviewed    11-16    137  |  103  |  64<H>  ----------------------------<  152<H>  4.1   |  21<L>  |  3.28<H>    Ca    8.6      16 Nov 2023 06:57  Mg     2.5     11-16      PT/INR - ( 15 Nov 2023 06:38 )   PT: 19.9 sec;   INR: 1.93 ratio               Urinalysis Basic - ( 16 Nov 2023 06:57 )    Color: x / Appearance: x / SG: x / pH: x  Gluc: 152 mg/dL / Ketone: x  / Bili: x / Urobili: x   Blood: x / Protein: x / Nitrite: x   Leuk Esterase: x / RBC: x / WBC x   Sq Epi: x / Non Sq Epi: x / Bacteria: x        RADIOLOGY & ADDITIONAL TESTS:    Imaging Personally Reviewed:    Consultant(s) Notes Reviewed:  , nephro    Care Discussed with Consultants/Other Providers: Dr. Kent ()

## 2023-11-16 NOTE — PROGRESS NOTE ADULT - PROBLEM SELECTOR PLAN 1
- Etiology: ischemic with LVEF <20%  - Bedside IVC dilated at 2 cm without inspiratory collapse. Start Torsemide 20 mg QD  - Monitor daily standing weights. Will assess CardioMEMS intermittently  - Reduce HDZN to 50 mg TID and ISDN to 20 mg TID, hold for SBP <90  - Continue Toprol XL 25 mg QD  - Farxiga stopped by nephrology 11/14 for concern of ongoing LAURA   - Please engage PT to prevent deconditioning  - Advanced therapies: holding off on launching VAD/transplant eval given cognitive issues however will continue to reassess. Of note he has good support and no clear psychosocial red flags. ABO AB.

## 2023-11-16 NOTE — PROGRESS NOTE ADULT - NS ATTEND AMEND GEN_ALL_CORE FT
Patient was found in bed in NAD, renal function is slightly worsening. He remains fluid overloaded on exam. Decreased dose of afterload reduction to allow high BP for brain and kidney perfusion. Holding Farxiga as per renal recs.     Continue hydralazine 50 mg TID and ISDN 20 mg TID   Toprol XL 25 mg daily   Start torsemide 20 mg daily and keep for the weekend, will check cardiomems next week to reassess diuretic needs  Continue aggressive PT /he might need to go to [facility] on discharge   Unfortunately, he is not a candidate for advanced therapies due to renal dysfunction, physical deconditioning, cerebral vascular disease

## 2023-11-16 NOTE — PROGRESS NOTE ADULT - ASSESSMENT
58M h/o prior CVA (2012) with mild L sided weakness, Mobitz II s/p Medtronic dual chamber ICD (12/21/22) DM2, HTN, HLD, admitted in CCU as a transfer from [facility] for NSTEMI on 10/13/23. His hospital course has been complicated by acute systolic dysfunction, LV thrombus, and possible acute stroke, s/p [facility] with triple vessel disease and MRI brain with significant stenosis, initially scheduled for angiogram but now with LAURA

## 2023-11-16 NOTE — PROGRESS NOTE ADULT - PROBLEM SELECTOR PLAN 3
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 Status post C-> 95% discrete proximal LAD disease and sequential multifocal disease with good distal target, 80-90% proximal LCx disease, severe multifocal RCA. Limited viability on MRI. Echocardiogram with LVEF <20%, rWMA & LV thrombus  - HF team & EP card f/u plans appreciated, continue with GDMT (Metoprolol ER 25mg/d, ARB/ARNI- none due to LAURA, No MRA, eventual SGLT2i)  - decreased Hydral 50mg tid and ISDN 20mg TID  - Inotropes: Off milrinone since 11/1  - Advanced therapies- holding off on launching VAD/transplant eval given cognitive issues   - No longer a PCI candidate due to contraindications for ECMO, which likely would be required as per EP note. Will reassess following completion of amiodarone load.    - daily weight and I/O  - torsemide 20mg daily

## 2023-11-16 NOTE — PROGRESS NOTE ADULT - PROBLEM SELECTOR PLAN 1
Scr continue to trend up to 3.1 this morning, baseline 1.28 on Oct 15, 2023  - likely in setting of cardiorenal  - s/p cardiomems with elevated pressure  - s/p IV bumex with hypertonic saline  - renal US with No hydronephrosis. Increased cortical echogenicity, suggestive of renal parenchymal disease  - appreciate nephro recs: care discussed with Dr. Pickett - agree with holding off angiogram at this time

## 2023-11-16 NOTE — PROGRESS NOTE ADULT - ASSESSMENT
59 YO M with history of CVA with residual mild R paresthesias, second degree Mobitz II heart block s/p DC-ICD 12/2022 (initial concern for sarcoid but negative biopsy/PET post procedure), DM2 (A1c 8.4%), and HTN who was admitted to [facility] with chest pain and dyspnea, found to have NSTEMI with markedly elevated troponins and new severe LV dysfunction with regional wall motion abnormalities as well as + enterovirus. He required NIPPV and was diuresed before being transferred to [facility] where LHC performed which revealed severe 3v CAD with critical proximal LAD involvement. CTS was consulted for CABG evaluation and HF consulted for comanagement. Given concern for low flow status sent back to CCU 10/28. Found to have LV thrombus. AMS prompting CT head with angio on 11/6 revealing R medial cerebellar infarct. MRI showing evolving PICA stroke and severe stenosis of L vertebral artery and occlusion o nondominant R vertebral artery. Given evolving stroke, brain lesion and ongoing confusion, not a candidate for coronary intervention. Additionally, per CTS review, no LAD viability. on 11/8, exhibited sustained MMVT that fell into monitoring zone of ICD that spontaneously terminated. Initiated on PO Amio load. Tolerated wean off Milrinone 11/1 with escalation of vasodilators.     He underwent RHC/CardioMEMS [id number] for longitudinal management, hemodynamics revealing elevated filling pressures and borderline CI. Clinical exam improved with use of IV Bumex and hypertonic saline but has persistent LAURA. Will adjust vasodilators to allow for increased renal perfusion and monitor response on an oral diuretic regimen.     Cardiac Studies  ·	RHC w/ CardioMEMS: RA 14 (v to 16), PA 46/24/32, PCWP 25 (v to 30), %, PA 56.8%, CO/CI (F) 4.2/2.19, /65 (86)  ·	TTE limited 11/8/23: LVEF <20%, compared to prior LV thrombus much smaller  ·	TTE 10/26/23: EF <20% LVT present, small pericardial effusion w/o tamponade  ·	RHC 10/19/23: RA 10, RV 47/9/13, Wedge 29, PA 41/26/33, CO/CI 4.4/2.3, PA sat 57.3  ·	CMR 10/18/23: There is greater than 50% thickness subendocardial LGE within the mid anteroseptum, anterior and anterolateral segments and apical segments  ·	TTE 10/16/23: LV 5.5 cm, LVEF 20% with regional WMAs worse in the apex, LVOT VTI 8 cm, normal RV size/function, severe functional MR, small pericardial effusion, estimated RA pressure 8 mmHg   ·	LHC 10/16/23: 95% discrete proximal LAD disease and sequential multifocal disease with good distal target, 80-90% proximal LCx disease just prior to marginals, severe multifocal RCA disease with good targets   ·	TTE 12/2022: normal LVEF     Hemodynamics  10/30 RHC: RHC: RA 14/13/12, RV 51/14, PA 47/34/39, PCW 34/36/32, CI 2.5/CO4.95   11/1/23 CVP 13, PA 48/23/35, mVO2 62.6%, Cris CI 2.4  10/21/23: RA 13 with V-wave 21, PA 50/33, PCW 33, MvO2 68.2, Cris CO/CI 4.4/2.56

## 2023-11-16 NOTE — PROGRESS NOTE ADULT - SUBJECTIVE AND OBJECTIVE BOX
Subjective:    Medications:  aMIOdarone    Tablet   Oral   aMIOdarone    Tablet 400 milliGRAM(s) Oral every 8 hours  aspirin enteric coated 81 milliGRAM(s) Oral daily  atorvastatin 80 milliGRAM(s) Oral at bedtime  chlorhexidine 2% Cloths 1 Application(s) Topical <User Schedule>  hydrALAZINE 50 milliGRAM(s) Oral every 8 hours  insulin glargine Injectable (LANTUS) 15 Unit(s) SubCutaneous at bedtime  insulin lispro (ADMELOG) corrective regimen sliding scale   SubCutaneous three times a day before meals  insulin lispro (ADMELOG) corrective regimen sliding scale   SubCutaneous at bedtime  insulin lispro Injectable (ADMELOG) 6 Unit(s) SubCutaneous three times a day before meals  isosorbide   dinitrate Tablet (ISORDIL) 20 milliGRAM(s) Oral three times a day  metoprolol succinate ER 25 milliGRAM(s) Oral daily  pantoprazole    Tablet 40 milliGRAM(s) Oral before breakfast  polyethylene glycol 3350 17 Gram(s) Oral daily PRN  senna 2 Tablet(s) Oral at bedtime  tamsulosin 0.4 milliGRAM(s) Oral at bedtime  torsemide 20 milliGRAM(s) Oral daily      Physical Exam:    Vitals:  Vital Signs Last 24 Hours  T(C): 36.7 (23 @ 11:53), Max: 36.7 (23 @ 05:55)  HR: 66 (23 @ 14:07) (66 - 74)  BP: 95/57 (23 @ 14:07) (95/55 - 102/63)  RR: 18 (23 @ 11:53) (18 - 18)  SpO2: 97% (23 @ 11:53) (97% - 99%)    Weight in k.5 ( @ 08:00)    I&O's Summary    15 Nov 2023 07:01  -  2023 07:00  --------------------------------------------------------  IN: 1010 mL / OUT: 350 mL / NET: 660 mL    2023 07:01  -  2023 16:59  --------------------------------------------------------  IN: 610 mL / OUT: 150 mL / NET: 460 mL    Tele: SR 70s    General: No distress. Comfortable.  HEENT: EOM intact.  Neck: Neck supple. JVP 12-14 cm H2O  Chest: Clear to auscultation bilaterally  CV: Normal S1 and S2. Radial pulses normal.  Abdomen: Soft, non-distended, non-tender  Extremities: Warm peripherally No edema  Neurology: Alert and oriented times three. Sensation intact  Psych: Affect normal    Labs:        137  |  103  |  64<H>  ----------------------------<  152<H>  4.1   |  21<L>  |  3.28<H>    Ca    8.6      2023 06:57  Mg     2.5           PT/INR - ( 15 Nov 2023 06:38 )   PT: 19.9 sec;   INR: 1.93 ratio                        Subjective: Patient was found in bed in NAD, he reports no pain/N/V.    Medications:  aMIOdarone    Tablet   Oral   aMIOdarone    Tablet 400 milliGRAM(s) Oral every 8 hours  aspirin enteric coated 81 milliGRAM(s) Oral daily  atorvastatin 80 milliGRAM(s) Oral at bedtime  chlorhexidine 2% Cloths 1 Application(s) Topical <User Schedule>  hydrALAZINE 50 milliGRAM(s) Oral every 8 hours  insulin glargine Injectable (LANTUS) 15 Unit(s) SubCutaneous at bedtime  insulin lispro (ADMELOG) corrective regimen sliding scale   SubCutaneous three times a day before meals  insulin lispro (ADMELOG) corrective regimen sliding scale   SubCutaneous at bedtime  insulin lispro Injectable (ADMELOG) 6 Unit(s) SubCutaneous three times a day before meals  isosorbide   dinitrate Tablet (ISORDIL) 20 milliGRAM(s) Oral three times a day  metoprolol succinate ER 25 milliGRAM(s) Oral daily  pantoprazole    Tablet 40 milliGRAM(s) Oral before breakfast  polyethylene glycol 3350 17 Gram(s) Oral daily PRN  senna 2 Tablet(s) Oral at bedtime  tamsulosin 0.4 milliGRAM(s) Oral at bedtime  torsemide 20 milliGRAM(s) Oral daily      Physical Exam:    Vitals:  Vital Signs Last 24 Hours  T(C): 36.7 (23 @ 11:53), Max: 36.7 (23 @ 05:55)  HR: 66 (23 @ 14:07) (66 - 74)  BP: 95/57 (23 @ 14:07) (95/55 - 102/63)  RR: 18 (23 @ 11:53) (18 - 18)  SpO2: 97% (23 @ 11:53) (97% - 99%)    Weight in k.5 ( @ 08:00)    I&O's Summary    15 Nov 2023 07:01  -  2023 07:00  --------------------------------------------------------  IN: 1010 mL / OUT: 350 mL / NET: 660 mL    2023 07:01  -  2023 16:59  --------------------------------------------------------  IN: 610 mL / OUT: 150 mL / NET: 460 mL    Tele: SR 70s    General: No distress. Comfortable.  HEENT: EOM intact.  Neck: Neck supple. JVP 12-14 cm H2O  Chest: Clear to auscultation bilaterally  CV: Normal S1 and S2. Radial pulses normal.  Abdomen: Soft, non-distended, non-tender  Extremities: Warm peripherally No edema  Neurology: Alert and oriented times three. Sensation intact  Psych: Affect normal    Labs:        137  |  103  |  64<H>  ----------------------------<  152<H>  4.1   |  21<L>  |  3.28<H>    Ca    8.6      2023 06:57  Mg     2.5           PT/INR - ( 15 Nov 2023 06:38 )   PT: 19.9 sec;   INR: 1.93 ratio

## 2023-11-17 LAB
ANION GAP SERPL CALC-SCNC: 11 MMOL/L — SIGNIFICANT CHANGE UP (ref 5–17)
ANION GAP SERPL CALC-SCNC: 11 MMOL/L — SIGNIFICANT CHANGE UP (ref 5–17)
BUN SERPL-MCNC: 64 MG/DL — HIGH (ref 7–23)
BUN SERPL-MCNC: 64 MG/DL — HIGH (ref 7–23)
CALCIUM SERPL-MCNC: 7.9 MG/DL — LOW (ref 8.4–10.5)
CALCIUM SERPL-MCNC: 7.9 MG/DL — LOW (ref 8.4–10.5)
CHLORIDE SERPL-SCNC: 103 MMOL/L — SIGNIFICANT CHANGE UP (ref 96–108)
CHLORIDE SERPL-SCNC: 103 MMOL/L — SIGNIFICANT CHANGE UP (ref 96–108)
CO2 SERPL-SCNC: 21 MMOL/L — LOW (ref 22–31)
CO2 SERPL-SCNC: 21 MMOL/L — LOW (ref 22–31)
CREAT SERPL-MCNC: 3.31 MG/DL — HIGH (ref 0.5–1.3)
CREAT SERPL-MCNC: 3.31 MG/DL — HIGH (ref 0.5–1.3)
EGFR: 21 ML/MIN/1.73M2 — LOW
EGFR: 21 ML/MIN/1.73M2 — LOW
GLUCOSE BLDC GLUCOMTR-MCNC: 108 MG/DL — HIGH (ref 70–99)
GLUCOSE BLDC GLUCOMTR-MCNC: 108 MG/DL — HIGH (ref 70–99)
GLUCOSE BLDC GLUCOMTR-MCNC: 131 MG/DL — HIGH (ref 70–99)
GLUCOSE BLDC GLUCOMTR-MCNC: 131 MG/DL — HIGH (ref 70–99)
GLUCOSE BLDC GLUCOMTR-MCNC: 217 MG/DL — HIGH (ref 70–99)
GLUCOSE BLDC GLUCOMTR-MCNC: 217 MG/DL — HIGH (ref 70–99)
GLUCOSE BLDC GLUCOMTR-MCNC: 79 MG/DL — SIGNIFICANT CHANGE UP (ref 70–99)
GLUCOSE BLDC GLUCOMTR-MCNC: 79 MG/DL — SIGNIFICANT CHANGE UP (ref 70–99)
GLUCOSE BLDC GLUCOMTR-MCNC: 86 MG/DL — SIGNIFICANT CHANGE UP (ref 70–99)
GLUCOSE BLDC GLUCOMTR-MCNC: 86 MG/DL — SIGNIFICANT CHANGE UP (ref 70–99)
GLUCOSE SERPL-MCNC: 71 MG/DL — SIGNIFICANT CHANGE UP (ref 70–99)
GLUCOSE SERPL-MCNC: 71 MG/DL — SIGNIFICANT CHANGE UP (ref 70–99)
HCT VFR BLD CALC: 32.1 % — LOW (ref 39–50)
HCT VFR BLD CALC: 32.1 % — LOW (ref 39–50)
HGB BLD-MCNC: 9.7 G/DL — LOW (ref 13–17)
HGB BLD-MCNC: 9.7 G/DL — LOW (ref 13–17)
INR BLD: 2.1 RATIO — HIGH (ref 0.85–1.18)
INR BLD: 2.1 RATIO — HIGH (ref 0.85–1.18)
MAGNESIUM SERPL-MCNC: 2.3 MG/DL — SIGNIFICANT CHANGE UP (ref 1.6–2.6)
MAGNESIUM SERPL-MCNC: 2.3 MG/DL — SIGNIFICANT CHANGE UP (ref 1.6–2.6)
MCHC RBC-ENTMCNC: 24.5 PG — LOW (ref 27–34)
MCHC RBC-ENTMCNC: 24.5 PG — LOW (ref 27–34)
MCHC RBC-ENTMCNC: 30.2 GM/DL — LOW (ref 32–36)
MCHC RBC-ENTMCNC: 30.2 GM/DL — LOW (ref 32–36)
MCV RBC AUTO: 81.1 FL — SIGNIFICANT CHANGE UP (ref 80–100)
MCV RBC AUTO: 81.1 FL — SIGNIFICANT CHANGE UP (ref 80–100)
NRBC # BLD: 0 /100 WBCS — SIGNIFICANT CHANGE UP (ref 0–0)
NRBC # BLD: 0 /100 WBCS — SIGNIFICANT CHANGE UP (ref 0–0)
PLATELET # BLD AUTO: 233 K/UL — SIGNIFICANT CHANGE UP (ref 150–400)
PLATELET # BLD AUTO: 233 K/UL — SIGNIFICANT CHANGE UP (ref 150–400)
POTASSIUM SERPL-MCNC: 4.1 MMOL/L — SIGNIFICANT CHANGE UP (ref 3.5–5.3)
POTASSIUM SERPL-MCNC: 4.1 MMOL/L — SIGNIFICANT CHANGE UP (ref 3.5–5.3)
POTASSIUM SERPL-SCNC: 4.1 MMOL/L — SIGNIFICANT CHANGE UP (ref 3.5–5.3)
POTASSIUM SERPL-SCNC: 4.1 MMOL/L — SIGNIFICANT CHANGE UP (ref 3.5–5.3)
PROTHROM AB SERPL-ACNC: 22.6 SEC — HIGH (ref 9.5–13)
PROTHROM AB SERPL-ACNC: 22.6 SEC — HIGH (ref 9.5–13)
RBC # BLD: 3.96 M/UL — LOW (ref 4.2–5.8)
RBC # BLD: 3.96 M/UL — LOW (ref 4.2–5.8)
RBC # FLD: 16 % — HIGH (ref 10.3–14.5)
RBC # FLD: 16 % — HIGH (ref 10.3–14.5)
SODIUM SERPL-SCNC: 135 MMOL/L — SIGNIFICANT CHANGE UP (ref 135–145)
SODIUM SERPL-SCNC: 135 MMOL/L — SIGNIFICANT CHANGE UP (ref 135–145)
WBC # BLD: 7.77 K/UL — SIGNIFICANT CHANGE UP (ref 3.8–10.5)
WBC # BLD: 7.77 K/UL — SIGNIFICANT CHANGE UP (ref 3.8–10.5)
WBC # FLD AUTO: 7.77 K/UL — SIGNIFICANT CHANGE UP (ref 3.8–10.5)
WBC # FLD AUTO: 7.77 K/UL — SIGNIFICANT CHANGE UP (ref 3.8–10.5)

## 2023-11-17 PROCEDURE — 99233 SBSQ HOSP IP/OBS HIGH 50: CPT

## 2023-11-17 RX ORDER — IRON SUCROSE 20 MG/ML
200 INJECTION, SOLUTION INTRAVENOUS EVERY 24 HOURS
Refills: 0 | Status: COMPLETED | OUTPATIENT
Start: 2023-11-17 | End: 2023-11-21

## 2023-11-17 RX ORDER — IRON SUCROSE 20 MG/ML
200 INJECTION, SOLUTION INTRAVENOUS EVERY 24 HOURS
Refills: 0 | Status: DISCONTINUED | OUTPATIENT
Start: 2023-11-17 | End: 2023-11-17

## 2023-11-17 RX ORDER — WARFARIN SODIUM 2.5 MG/1
5 TABLET ORAL ONCE
Refills: 0 | Status: COMPLETED | OUTPATIENT
Start: 2023-11-17 | End: 2023-11-17

## 2023-11-17 RX ADMIN — Medication 81 MILLIGRAM(S): at 11:59

## 2023-11-17 RX ADMIN — ATORVASTATIN CALCIUM 80 MILLIGRAM(S): 80 TABLET, FILM COATED ORAL at 21:42

## 2023-11-17 RX ADMIN — Medication 6 UNIT(S): at 08:51

## 2023-11-17 RX ADMIN — Medication 50 MILLIGRAM(S): at 13:36

## 2023-11-17 RX ADMIN — ISOSORBIDE DINITRATE 20 MILLIGRAM(S): 5 TABLET ORAL at 12:09

## 2023-11-17 RX ADMIN — ISOSORBIDE DINITRATE 20 MILLIGRAM(S): 5 TABLET ORAL at 05:18

## 2023-11-17 RX ADMIN — INSULIN GLARGINE 15 UNIT(S): 100 INJECTION, SOLUTION SUBCUTANEOUS at 21:41

## 2023-11-17 RX ADMIN — CHLORHEXIDINE GLUCONATE 1 APPLICATION(S): 213 SOLUTION TOPICAL at 05:15

## 2023-11-17 RX ADMIN — TAMSULOSIN HYDROCHLORIDE 0.4 MILLIGRAM(S): 0.4 CAPSULE ORAL at 21:42

## 2023-11-17 RX ADMIN — AMIODARONE HYDROCHLORIDE 200 MILLIGRAM(S): 400 TABLET ORAL at 05:15

## 2023-11-17 RX ADMIN — ISOSORBIDE DINITRATE 20 MILLIGRAM(S): 5 TABLET ORAL at 18:10

## 2023-11-17 RX ADMIN — WARFARIN SODIUM 5 MILLIGRAM(S): 2.5 TABLET ORAL at 21:42

## 2023-11-17 RX ADMIN — PANTOPRAZOLE SODIUM 40 MILLIGRAM(S): 20 TABLET, DELAYED RELEASE ORAL at 05:15

## 2023-11-17 RX ADMIN — Medication 20 MILLIGRAM(S): at 05:17

## 2023-11-17 RX ADMIN — IRON SUCROSE 200 MILLIGRAM(S): 20 INJECTION, SOLUTION INTRAVENOUS at 11:58

## 2023-11-17 RX ADMIN — Medication 25 MILLIGRAM(S): at 05:15

## 2023-11-17 RX ADMIN — Medication 2: at 18:09

## 2023-11-17 RX ADMIN — Medication 6 UNIT(S): at 18:09

## 2023-11-17 RX ADMIN — Medication 6 UNIT(S): at 11:58

## 2023-11-17 NOTE — PROGRESS NOTE ADULT - SUBJECTIVE AND OBJECTIVE BOX
[facility]   [phone]  RENAL FOLLOW UP NOTE  --------------------------------------------------------------------------------  Chief Complaint:    24 hour events/subjective:    patient seen and examined with Dr. Piyush helton    PAST HISTORY  --------------------------------------------------------------------------------  No significant changes to PMH, PSH, FHx, SHx, unless otherwise noted    ALLERGIES & MEDICATIONS  --------------------------------------------------------------------------------  Allergies    No Known Allergies    Intolerances      Standing Inpatient Medications  aMIOdarone    Tablet   Oral   aMIOdarone    Tablet 200 milliGRAM(s) Oral daily  aspirin enteric coated 81 milliGRAM(s) Oral daily  atorvastatin 80 milliGRAM(s) Oral at bedtime  chlorhexidine 2% Cloths 1 Application(s) Topical <User Schedule>  hydrALAZINE 50 milliGRAM(s) Oral every 8 hours  insulin glargine Injectable (LANTUS) 15 Unit(s) SubCutaneous at bedtime  insulin lispro (ADMELOG) corrective regimen sliding scale   SubCutaneous three times a day before meals  insulin lispro (ADMELOG) corrective regimen sliding scale   SubCutaneous at bedtime  insulin lispro Injectable (ADMELOG) 6 Unit(s) SubCutaneous three times a day before meals  iron sucrose Injectable 200 milliGRAM(s) IV Push every 24 hours  isosorbide   dinitrate Tablet (ISORDIL) 20 milliGRAM(s) Oral three times a day  metoprolol succinate ER 25 milliGRAM(s) Oral daily  pantoprazole    Tablet 40 milliGRAM(s) Oral before breakfast  senna 2 Tablet(s) Oral at bedtime  tamsulosin 0.4 milliGRAM(s) Oral at bedtime  torsemide 20 milliGRAM(s) Oral daily    PRN Inpatient Medications  polyethylene glycol 3350 17 Gram(s) Oral daily PRN      REVIEW OF SYSTEMS  --------------------------------------------------------------------------------    Gen: denies fevers/chills,  CVS: denies chest pain/palpitations  Resp: denies SOB/Cough  GI: Denies N/V/Abd pain  : Denies dysuria/oliguria/hematuria    VITALS/PHYSICAL EXAM  --------------------------------------------------------------------------------  T(C): 36.6 (11-17-23 @ 11:39), Max: 36.9 (11-17-23 @ 00:00)  HR: 66 (11-17-23 @ 13:34) (65 - 68)  BP: 95/59 (11-17-23 @ 13:34) (93/55 - 104/63)  RR: 18 (11-17-23 @ 11:39) (15 - 18)  SpO2: 96% (11-17-23 @ 11:39) (95% - 98%)  Wt(kg): --        11-16-23 @ 07:01  -  11-17-23 @ 07:00  --------------------------------------------------------  IN: 610 mL / OUT: 300 mL / NET: 310 mL    11-17-23 @ 07:01  -  11-17-23 @ 17:17  --------------------------------------------------------  IN: 630 mL / OUT: 0 mL / NET: 630 mL      Physical Exam:  	  	Gen: ill appearing male on O2  	Pulm: decrease bs  no rales or ronchi or wheezing  	CV: +JVD. RRR, S1S2; no rub  	Back: No CVA tenderness; no sacral edema  	Abd: +BS, soft, nontender/nondistended  	: No suprapubic tenderness  	UE: Warm, no cyanosis  no clubbing,  no edema;  	LE: Warm, no cyanosis  no clubbing, no edema    LABS/STUDIES  --------------------------------------------------------------------------------              9.7    7.77  >-----------<  233      [11-17-23 @ 06:20]              32.1     135  |  103  |  64  ----------------------------<  71      [11-17-23 @ 06:20]  4.1   |  21  |  3.31        Ca     7.9     [11-17-23 @ 06:20]      Mg     2.3     [11-17-23 @ 06:20]      PT/INR: PT 22.6 , INR 2.10       [11-17-23 @ 13:19]      Creatinine Trend:  SCr 3.31 [11-17 @ 06:20]  SCr 3.28 [11-16 @ 06:57]  SCr 3.19 [11-15 @ 06:39]  SCr 3.12 [11-14 @ 05:41]  SCr 3.13 [11-13 @ 06:13]                 Iron 22, TIBC 255, %sat 8      [11-15-23 @ 06:39]  Ferritin 72      [11-15-23 @ 06:39]  TSH 0.34      [11-10-23 @ 06:32]  Lipid: chol 167, TG 80, HDL 52, LDL --      [10-17-23 @ 08:16]    Syphilis Screen (Treponema Pallidum Ab) Negative      [10-29-23 @ 00:03]

## 2023-11-17 NOTE — PROGRESS NOTE ADULT - PROBLEM SELECTOR PLAN 1
Scr continue to trend up to 3.3 this morning, baseline 1.28 on Oct 15, 2023  - likely in setting of cardiorenal vs. ATN from hypotension  - s/p cardiomems with elevated pressure  - s/p IV bumex with hypertonic saline  - renal US with No hydronephrosis. Increased cortical echogenicity, suggestive of renal parenchymal disease  - appreciate nephro recs: care discussed with Dr. Pickett - agree with holding off angiogram at this time

## 2023-11-17 NOTE — PROGRESS NOTE ADULT - ASSESSMENT
58M h/o prior CVA (2012) with mild L sided weakness, Mobitz II s/p Medtronic dual chamber ICD (12/21/22) DM2, HTN, HLD, admitted in CCU as a transfer from [facility] for NSTEMI on 10/13/23. His hospital course has been complicated by acute systolic dysfunction, LV thrombus, and possible acute stroke, s/p [facility] with triple vessel disease and MRI brain with significant stenosis, initially scheduled for angiogram but now with LAURA

## 2023-11-17 NOTE — PROGRESS NOTE ADULT - ASSESSMENT
59 yo Male pmhx CVA with mild left sided deficits, HTN, DM2, vertigo, HLD, Mobitz II s/pp Medtronic dual chamber ICD (12/21/22) initially presented to [facility] from home with complaints of dyspnea and chest pain and was admitted w/ acute hypoxic respiratory failure likely due to acute pulmonary edema due to acute HFrEF in setting of acute NSTEMI, LAURA and enterovirus infection. Pt with brief MICU stay at [facility] requiring bipap (no intubation), was treated for PNA with cefepime, and was found to have TTE done 9/26/23 showing EF 20% with severely decreased LVSF, multiple regional wall motion abnormalities, and elevated LV end diastolic pressure. Pt was transferred to [facility] for cardiac evaluation. [facility] performed which revealed severe 3v CAD with critical proximal LAD involvement. CTS was consulted for CA Found to have LV thrombus. AMS prompting CT head with angio on 11/6 revealing R medial cerebellar infarct. MRI showing evolving PICA stroke and severe stenosis of L vertebral artery and occlusion o nondominant R vertebral artery.R cerebellar/PICA infarct as well as L parietal embolic infarct, likely cardioembolic neurosx plans for cerebral angio.    [facility] 10/30  CTA 11/6    1- LAURA   2- CVA  3- cardiomyopathy  4- NSTEMI/3 vessel disease  5- CHF   6- htn     overall LAURA in setting of decompensated CHF requiring diuresis. creatinine had been holding steady ~2.2 (10/30-11/10)  now with worsening LAURA since 11/11 and seems to have stabilizing at this level  last contrast study was 11/6, for CTA. with creatinine rise 11/11, not suspecting contrast related.   farxiga was added 11/10 and with worsening creatinine   neuro planning neuro angio, however now with laura, and will have high risk contrast nephropathy. would hold off on angio, unless it becomes urgent/emergent.  cont to hold farxiga for now   continue diuresis torsemide 20 mg daily  hydralazine 75 mg tid for htn and afterload reduction   strict I/O  trend creatinine and electrolytes closely

## 2023-11-17 NOTE — PROGRESS NOTE ADULT - NS ATTEND AMEND GEN_ALL_CORE FT
Seen, examined with, formulated plan with and  agree with above as scribed by NP Pauline [[email or web address]]     states feels well and wants to go home   + JVD  heart RRR  lungs decrease bs no rales   ext no edema      LAURA   CHF  htn    cr still high   torsemide 20 mg to cont   holding farxiga given cr rise as it was started as well

## 2023-11-17 NOTE — PROGRESS NOTE ADULT - SUBJECTIVE AND OBJECTIVE BOX
Bernarda Reed MD  Division of Hospital Medicine  Please contact via MS Teams (prefer message first)  Office: [phone]    Patient is a 58y old  Male who presents with a chief complaint of NSTEMI (16 Nov 2023 16:58)      SUBJECTIVE / OVERNIGHT EVENTS:  no acute events overnight, vss, afebrile  pt feels well this morning, eating better  reports good urine output    ROS:  14 point ROS negative in detail except stated as above    MEDICATIONS  (STANDING):  aMIOdarone    Tablet   Oral   aMIOdarone    Tablet 200 milliGRAM(s) Oral daily  aspirin enteric coated 81 milliGRAM(s) Oral daily  atorvastatin 80 milliGRAM(s) Oral at bedtime  chlorhexidine 2% Cloths 1 Application(s) Topical <User Schedule>  hydrALAZINE 50 milliGRAM(s) Oral every 8 hours  insulin glargine Injectable (LANTUS) 15 Unit(s) SubCutaneous at bedtime  insulin lispro (ADMELOG) corrective regimen sliding scale   SubCutaneous three times a day before meals  insulin lispro (ADMELOG) corrective regimen sliding scale   SubCutaneous at bedtime  insulin lispro Injectable (ADMELOG) 6 Unit(s) SubCutaneous three times a day before meals  iron sucrose Injectable 200 milliGRAM(s) IV Push every 24 hours  isosorbide   dinitrate Tablet (ISORDIL) 20 milliGRAM(s) Oral three times a day  metoprolol succinate ER 25 milliGRAM(s) Oral daily  pantoprazole    Tablet 40 milliGRAM(s) Oral before breakfast  senna 2 Tablet(s) Oral at bedtime  tamsulosin 0.4 milliGRAM(s) Oral at bedtime  torsemide 20 milliGRAM(s) Oral daily    MEDICATIONS  (PRN):  polyethylene glycol 3350 17 Gram(s) Oral daily PRN Constipation      CAPILLARY BLOOD GLUCOSE      POCT Blood Glucose.: 86 mg/dL (17 Nov 2023 08:49)  POCT Blood Glucose.: 79 mg/dL (17 Nov 2023 07:59)  POCT Blood Glucose.: 130 mg/dL (16 Nov 2023 21:09)  POCT Blood Glucose.: 160 mg/dL (16 Nov 2023 17:18)  POCT Blood Glucose.: 188 mg/dL (16 Nov 2023 11:40)    I&O's Summary    16 Nov 2023 07:01  -  17 Nov 2023 07:00  --------------------------------------------------------  IN: 610 mL / OUT: 300 mL / NET: 310 mL        PHYSICAL EXAM:  Vital Signs Last 24 Hrs  T(C): 36.8 (17 Nov 2023 05:31), Max: 36.9 (17 Nov 2023 00:00)  T(F): 98.3 (17 Nov 2023 05:31), Max: 98.4 (17 Nov 2023 00:00)  HR: 68 (17 Nov 2023 05:31) (66 - 68)  BP: 104/63 (17 Nov 2023 05:31) (93/55 - 104/63)  BP(mean): --  RR: 15 (17 Nov 2023 05:31) (15 - 18)  SpO2: 95% (17 Nov 2023 05:31) (95% - 99%)    Parameters below as of 17 Nov 2023 05:31  Patient On (Oxygen Delivery Method): nasal cannula  O2 Flow (L/min): 2    GENERAL: NAD, well-developed  HEAD:  Atraumatic, Normocephalic  EYES: EOMI, PERRLA, conjunctiva and sclera clear  NECK: Supple, No JVD  CHEST/LUNG: Clear to auscultation bilaterally; No wheeze  HEART: Regular rate and rhythm; No murmurs, rubs, or gallops  ABDOMEN: Soft, Nontender, Nondistended; Bowel sounds present  EXTREMITIES:  2+ Peripheral Pulses, No clubbing, cyanosis, or edema  NEUROLOGY: AAOx3; non-focal  SKIN: No rashes or lesions    LABS:  personally reviewed                        9.7    7.77  )-----------( 233      ( 17 Nov 2023 06:20 )             32.1     11-17    135  |  103  |  64<H>  ----------------------------<  71  4.1   |  21<L>  |  3.31<H>    Ca    7.9<L>      17 Nov 2023 06:20  Mg     2.3     11-17            Urinalysis Basic - ( 17 Nov 2023 06:20 )    Color: x / Appearance: x / SG: x / pH: x  Gluc: 71 mg/dL / Ketone: x  / Bili: x / Urobili: x   Blood: x / Protein: x / Nitrite: x   Leuk Esterase: x / RBC: x / WBC x   Sq Epi: x / Non Sq Epi: x / Bacteria: x        RADIOLOGY & ADDITIONAL TESTS:    Imaging Personally Reviewed:    Consultant(s) Notes Reviewed:  nephro, HF    Care Discussed with Consultants/Other Providers: Dr. Pickett (nephro)

## 2023-11-18 LAB
BUN SERPL-MCNC: 65 MG/DL — HIGH (ref 7–23)
BUN SERPL-MCNC: 65 MG/DL — HIGH (ref 7–23)
CALCIUM SERPL-MCNC: 8.3 MG/DL — LOW (ref 8.4–10.5)
CALCIUM SERPL-MCNC: 8.3 MG/DL — LOW (ref 8.4–10.5)
CHLORIDE SERPL-SCNC: 105 MMOL/L — SIGNIFICANT CHANGE UP (ref 96–108)
CHLORIDE SERPL-SCNC: 105 MMOL/L — SIGNIFICANT CHANGE UP (ref 96–108)
CO2 SERPL-SCNC: 22 MMOL/L — SIGNIFICANT CHANGE UP (ref 22–31)
CO2 SERPL-SCNC: 22 MMOL/L — SIGNIFICANT CHANGE UP (ref 22–31)
CREAT SERPL-MCNC: 3.3 MG/DL — HIGH (ref 0.5–1.3)
CREAT SERPL-MCNC: 3.3 MG/DL — HIGH (ref 0.5–1.3)
EGFR: 21 ML/MIN/1.73M2 — LOW
EGFR: 21 ML/MIN/1.73M2 — LOW
GLUCOSE BLDC GLUCOMTR-MCNC: 151 MG/DL — HIGH (ref 70–99)
GLUCOSE BLDC GLUCOMTR-MCNC: 151 MG/DL — HIGH (ref 70–99)
GLUCOSE BLDC GLUCOMTR-MCNC: 156 MG/DL — HIGH (ref 70–99)
GLUCOSE BLDC GLUCOMTR-MCNC: 156 MG/DL — HIGH (ref 70–99)
GLUCOSE BLDC GLUCOMTR-MCNC: 161 MG/DL — HIGH (ref 70–99)
GLUCOSE BLDC GLUCOMTR-MCNC: 161 MG/DL — HIGH (ref 70–99)
GLUCOSE BLDC GLUCOMTR-MCNC: 82 MG/DL — SIGNIFICANT CHANGE UP (ref 70–99)
GLUCOSE BLDC GLUCOMTR-MCNC: 82 MG/DL — SIGNIFICANT CHANGE UP (ref 70–99)
GLUCOSE SERPL-MCNC: 89 MG/DL — SIGNIFICANT CHANGE UP (ref 70–99)
GLUCOSE SERPL-MCNC: 89 MG/DL — SIGNIFICANT CHANGE UP (ref 70–99)
HCT VFR BLD CALC: 33 % — LOW (ref 39–50)
HCT VFR BLD CALC: 33 % — LOW (ref 39–50)
HGB BLD-MCNC: 10 G/DL — LOW (ref 13–17)
HGB BLD-MCNC: 10 G/DL — LOW (ref 13–17)
INR BLD: 2.43 RATIO — HIGH (ref 0.85–1.18)
INR BLD: 2.43 RATIO — HIGH (ref 0.85–1.18)
MCHC RBC-ENTMCNC: 24.3 PG — LOW (ref 27–34)
MCHC RBC-ENTMCNC: 24.3 PG — LOW (ref 27–34)
MCHC RBC-ENTMCNC: 30.3 GM/DL — LOW (ref 32–36)
MCHC RBC-ENTMCNC: 30.3 GM/DL — LOW (ref 32–36)
MCV RBC AUTO: 80.3 FL — SIGNIFICANT CHANGE UP (ref 80–100)
MCV RBC AUTO: 80.3 FL — SIGNIFICANT CHANGE UP (ref 80–100)
NRBC # BLD: 0 /100 WBCS — SIGNIFICANT CHANGE UP (ref 0–0)
NRBC # BLD: 0 /100 WBCS — SIGNIFICANT CHANGE UP (ref 0–0)
PLATELET # BLD AUTO: 232 K/UL — SIGNIFICANT CHANGE UP (ref 150–400)
PLATELET # BLD AUTO: 232 K/UL — SIGNIFICANT CHANGE UP (ref 150–400)
POTASSIUM SERPL-MCNC: 4 MMOL/L — SIGNIFICANT CHANGE UP (ref 3.5–5.3)
POTASSIUM SERPL-MCNC: 4 MMOL/L — SIGNIFICANT CHANGE UP (ref 3.5–5.3)
POTASSIUM SERPL-SCNC: 4 MMOL/L — SIGNIFICANT CHANGE UP (ref 3.5–5.3)
POTASSIUM SERPL-SCNC: 4 MMOL/L — SIGNIFICANT CHANGE UP (ref 3.5–5.3)
PROTHROM AB SERPL-ACNC: 26 SEC — HIGH (ref 9.5–13)
PROTHROM AB SERPL-ACNC: 26 SEC — HIGH (ref 9.5–13)
RBC # BLD: 4.11 M/UL — LOW (ref 4.2–5.8)
RBC # BLD: 4.11 M/UL — LOW (ref 4.2–5.8)
RBC # FLD: 15.9 % — HIGH (ref 10.3–14.5)
RBC # FLD: 15.9 % — HIGH (ref 10.3–14.5)
SODIUM SERPL-SCNC: 138 MMOL/L — SIGNIFICANT CHANGE UP (ref 135–145)
SODIUM SERPL-SCNC: 138 MMOL/L — SIGNIFICANT CHANGE UP (ref 135–145)
WBC # BLD: 7.13 K/UL — SIGNIFICANT CHANGE UP (ref 3.8–10.5)
WBC # BLD: 7.13 K/UL — SIGNIFICANT CHANGE UP (ref 3.8–10.5)
WBC # FLD AUTO: 7.13 K/UL — SIGNIFICANT CHANGE UP (ref 3.8–10.5)
WBC # FLD AUTO: 7.13 K/UL — SIGNIFICANT CHANGE UP (ref 3.8–10.5)

## 2023-11-18 PROCEDURE — 99233 SBSQ HOSP IP/OBS HIGH 50: CPT

## 2023-11-18 RX ORDER — WARFARIN SODIUM 2.5 MG/1
5 TABLET ORAL ONCE
Refills: 0 | Status: COMPLETED | OUTPATIENT
Start: 2023-11-18 | End: 2023-11-18

## 2023-11-18 RX ADMIN — ATORVASTATIN CALCIUM 80 MILLIGRAM(S): 80 TABLET, FILM COATED ORAL at 21:29

## 2023-11-18 RX ADMIN — Medication 50 MILLIGRAM(S): at 13:26

## 2023-11-18 RX ADMIN — CHLORHEXIDINE GLUCONATE 1 APPLICATION(S): 213 SOLUTION TOPICAL at 05:29

## 2023-11-18 RX ADMIN — Medication 1: at 16:51

## 2023-11-18 RX ADMIN — Medication 50 MILLIGRAM(S): at 21:29

## 2023-11-18 RX ADMIN — PANTOPRAZOLE SODIUM 40 MILLIGRAM(S): 20 TABLET, DELAYED RELEASE ORAL at 05:28

## 2023-11-18 RX ADMIN — WARFARIN SODIUM 5 MILLIGRAM(S): 2.5 TABLET ORAL at 21:29

## 2023-11-18 RX ADMIN — ISOSORBIDE DINITRATE 20 MILLIGRAM(S): 5 TABLET ORAL at 05:29

## 2023-11-18 RX ADMIN — IRON SUCROSE 200 MILLIGRAM(S): 20 INJECTION, SOLUTION INTRAVENOUS at 09:05

## 2023-11-18 RX ADMIN — Medication 20 MILLIGRAM(S): at 05:28

## 2023-11-18 RX ADMIN — AMIODARONE HYDROCHLORIDE 200 MILLIGRAM(S): 400 TABLET ORAL at 05:28

## 2023-11-18 RX ADMIN — Medication 1: at 11:43

## 2023-11-18 RX ADMIN — ISOSORBIDE DINITRATE 20 MILLIGRAM(S): 5 TABLET ORAL at 16:48

## 2023-11-18 RX ADMIN — Medication 25 MILLIGRAM(S): at 05:29

## 2023-11-18 RX ADMIN — SENNA PLUS 2 TABLET(S): 8.6 TABLET ORAL at 21:29

## 2023-11-18 RX ADMIN — INSULIN GLARGINE 15 UNIT(S): 100 INJECTION, SOLUTION SUBCUTANEOUS at 21:29

## 2023-11-18 RX ADMIN — TAMSULOSIN HYDROCHLORIDE 0.4 MILLIGRAM(S): 0.4 CAPSULE ORAL at 21:29

## 2023-11-18 RX ADMIN — Medication 6 UNIT(S): at 16:51

## 2023-11-18 RX ADMIN — Medication 6 UNIT(S): at 11:42

## 2023-11-18 RX ADMIN — ISOSORBIDE DINITRATE 20 MILLIGRAM(S): 5 TABLET ORAL at 11:42

## 2023-11-18 RX ADMIN — Medication 81 MILLIGRAM(S): at 11:43

## 2023-11-18 NOTE — PROGRESS NOTE ADULT - PROBLEM SELECTOR PLAN 1
Scr continue to trend up to 3.3 this morning, baseline 1.28 on Oct 15, 2023  - likely in setting of cardiorenal vs. ATN from hypotension  - s/p cardiomems with elevated pressure  - s/p IV bumex with hypertonic saline  - renal US with No hydronephrosis. Increased cortical echogenicity, suggestive of renal parenchymal disease  - appreciate ongoing nephrology recommendations, holding off on angiogram at this time

## 2023-11-18 NOTE — PROGRESS NOTE ADULT - ASSESSMENT
57 yo Male pmhx CVA with mild left sided deficits, HTN, DM2, vertigo, HLD, Mobitz II s/pp Medtronic dual chamber ICD (12/21/22) initially presented to [facility] from home with complaints of dyspnea and chest pain and was admitted w/ acute hypoxic respiratory failure likely due to acute pulmonary edema due to acute HFrEF in setting of acute NSTEMI, LAURA and enterovirus infection. Pt with brief MICU stay at [facility] requiring bipap (no intubation), was treated for PNA with cefepime, and was found to have TTE done 9/26/23 showing EF 20% with severely decreased LVSF, multiple regional wall motion abnormalities, and elevated LV end diastolic pressure. Pt was transferred to [facility] for cardiac evaluation. C performed which revealed severe 3v CAD with critical proximal LAD involvement. CTS was consulted for CA Found to have LV thrombus. AMS prompting CT head with angio on 11/6 revealing R medial cerebellar infarct. MRI showing evolving PICA stroke and severe stenosis of L vertebral artery and occlusion o nondominant R vertebral artery.R cerebellar/PICA infarct as well as L parietal embolic infarct, likely cardioembolic neurosx plans for cerebral angio.    [facility] 10/30  CTA 11/6    1- LAURA   2- CVA  3- cardiomyopathy  4- NSTEMI/3 vessel disease  5- CHF   6- htn     overall LAURA in setting of decompensated CHF requiring diuresis. creatinine had been holding steady ~2.2 (10/30-11/10)  now with worsening LAURA since 11/11 and creatinine seems to have plateau'd at this level ~3.3  hold farxiga  continue diuresis torsemide 20 mg daily  his weight is not overall improving  hydralazine 50 mg tid/isosorbice 20mg TID for htn and afterload reduction   now with borderline low BP, although is maintaining SBP >90  check bladder scan PVR today, last documented on 1/14 230 cc  flomax 0.4 mg daily  strict I/O  trend creatinine and electrolytes daily  neuro planning neuro angio, however now with laura, and will have high risk contrast nephropathy. recommend hold off on angio, unless it becomes urgent/emergent.

## 2023-11-18 NOTE — PROGRESS NOTE ADULT - SUBJECTIVE AND OBJECTIVE BOX
Bedside and Verbal shift change report given to Bill Chaudhary RN (oncoming nurse) by Binh Jaramillo RN (offgoing nurse). Report included the following information SBAR, Kardex, ED Summary, MAR, Accordion and Recent Results. Patient was incontinent of urine. Cleaned patient and provided new linens. Patient states he is aware when he needs to urinate but experiences urgency and needs urinal at bedside which was provided. [facility] Division of Hospital Medicine  Bernard Montelongo MD  Available via MS Teams    SUBJECTIVE / OVERNIGHT EVENTS: Patient seen and examined at bedside, resting comfortably and in no acute distress. Denies chest pain, palpitations. Denies shortness of breath or cough. ROS otherwise negative.     MEDICATIONS  (STANDING):  aMIOdarone    Tablet   Oral   aMIOdarone    Tablet 200 milliGRAM(s) Oral daily  aspirin enteric coated 81 milliGRAM(s) Oral daily  atorvastatin 80 milliGRAM(s) Oral at bedtime  chlorhexidine 2% Cloths 1 Application(s) Topical <User Schedule>  hydrALAZINE 50 milliGRAM(s) Oral every 8 hours  insulin glargine Injectable (LANTUS) 15 Unit(s) SubCutaneous at bedtime  insulin lispro (ADMELOG) corrective regimen sliding scale   SubCutaneous three times a day before meals  insulin lispro (ADMELOG) corrective regimen sliding scale   SubCutaneous at bedtime  insulin lispro Injectable (ADMELOG) 6 Unit(s) SubCutaneous three times a day before meals  iron sucrose Injectable 200 milliGRAM(s) IV Push every 24 hours  isosorbide   dinitrate Tablet (ISORDIL) 20 milliGRAM(s) Oral three times a day  metoprolol succinate ER 25 milliGRAM(s) Oral daily  pantoprazole    Tablet 40 milliGRAM(s) Oral before breakfast  senna 2 Tablet(s) Oral at bedtime  tamsulosin 0.4 milliGRAM(s) Oral at bedtime  torsemide 20 milliGRAM(s) Oral daily    MEDICATIONS  (PRN):  polyethylene glycol 3350 17 Gram(s) Oral daily PRN Constipation      I&O's Summary    17 Nov 2023 07:01  -  18 Nov 2023 07:00  --------------------------------------------------------  IN: 630 mL / OUT: 400 mL / NET: 230 mL    18 Nov 2023 07:01  -  18 Nov 2023 11:43  --------------------------------------------------------  IN: 286 mL / OUT: 200 mL / NET: 86 mL        PHYSICAL EXAM:  Vital Signs Last 24 Hrs  T(C): 36.8 (18 Nov 2023 03:11), Max: 36.8 (18 Nov 2023 03:11)  T(F): 98.3 (18 Nov 2023 03:11), Max: 98.3 (18 Nov 2023 03:11)  HR: 75 (18 Nov 2023 03:11) (66 - 75)  BP: 94/53 (18 Nov 2023 03:11) (92/58 - 95/59)  RR: 17 (18 Nov 2023 03:11) (17 - 18)  SpO2: 95% (18 Nov 2023 03:11) (95% - 96%)    Parameters below as of 18 Nov 2023 03:11  Patient On (Oxygen Delivery Method): nasal cannula  O2 Flow (L/min): 2    CONSTITUTIONAL: NAD, well-groomed  EYES: PERRLA; conjunctiva and sclera clear  ENMT: Moist oral mucosa, no pharyngeal injection or exudates; normal dentition  NECK: Supple, no palpable masses; no thyromegaly  RESPIRATORY: Normal respiratory effort; lungs are clear to auscultation bilaterally though breath sounds are diminished   CARDIOVASCULAR: normal S1 and S2, no murmur/rub/gallop; No lower extremity edema  ABDOMEN: Nontender to palpation, normoactive bowel sounds, no rebound/guarding; No hepatosplenomegaly  MUSCULOSKELETAL:  no clubbing or cyanosis of digits; no joint swelling or tenderness to palpation  PSYCH: A+O to person, place, not to time   NEUROLOGY: CN 2-12 are intact and symmetric; no gross sensory deficits   SKIN: No rashes; no palpable lesions    LABS:                        10.0   7.13  )-----------( 232      ( 18 Nov 2023 05:30 )             33.0     11-18    138  |  105  |  65<H>  ----------------------------<  89  4.0   |  22  |  3.30<H>    Ca    8.3<L>      18 Nov 2023 05:30  Mg     2.3     11-17      PT/INR - ( 18 Nov 2023 05:30 )   PT: 26.0 sec;   INR: 2.43 ratio               Urinalysis Basic - ( 18 Nov 2023 05:30 )    Color: x / Appearance: x / SG: x / pH: x  Gluc: 89 mg/dL / Ketone: x  / Bili: x / Urobili: x   Blood: x / Protein: x / Nitrite: x   Leuk Esterase: x / RBC: x / WBC x   Sq Epi: x / Non Sq Epi: x / Bacteria: x        COVID-19 PCR: NotDetec (15 Nov 2023 09:01)  SARS-CoV-2: NotDetec (06 Nov 2023 19:17)  SARS-CoV-2: NotDetec (03 Nov 2023 18:25)  SARS-CoV-2: NotDetec (24 Oct 2023 07:17)  SARS-CoV-2: NotDetec (20 Oct 2023 09:18)  COVID-19 PCR: NotDetec (14 Oct 2023 11:34)

## 2023-11-18 NOTE — PROGRESS NOTE ADULT - SUBJECTIVE AND OBJECTIVE BOX
[facility]   [phone]  RENAL FOLLOW UP NOTE  --------------------------------------------------------------------------------  Chief Complaint:    24 hour events/subjective:    patient seen and examined.   denies sob    PAST HISTORY  --------------------------------------------------------------------------------  No significant changes to PMH, PSH, FHx, SHx, unless otherwise noted    ALLERGIES & MEDICATIONS  --------------------------------------------------------------------------------  Allergies    No Known Allergies    Intolerances      Standing Inpatient Medications  aMIOdarone    Tablet   Oral   aMIOdarone    Tablet 200 milliGRAM(s) Oral daily  aspirin enteric coated 81 milliGRAM(s) Oral daily  atorvastatin 80 milliGRAM(s) Oral at bedtime  chlorhexidine 2% Cloths 1 Application(s) Topical <User Schedule>  hydrALAZINE 50 milliGRAM(s) Oral every 8 hours  insulin glargine Injectable (LANTUS) 15 Unit(s) SubCutaneous at bedtime  insulin lispro (ADMELOG) corrective regimen sliding scale   SubCutaneous three times a day before meals  insulin lispro (ADMELOG) corrective regimen sliding scale   SubCutaneous at bedtime  insulin lispro Injectable (ADMELOG) 6 Unit(s) SubCutaneous three times a day before meals  iron sucrose Injectable 200 milliGRAM(s) IV Push every 24 hours  isosorbide   dinitrate Tablet (ISORDIL) 20 milliGRAM(s) Oral three times a day  metoprolol succinate ER 25 milliGRAM(s) Oral daily  pantoprazole    Tablet 40 milliGRAM(s) Oral before breakfast  senna 2 Tablet(s) Oral at bedtime  tamsulosin 0.4 milliGRAM(s) Oral at bedtime  torsemide 20 milliGRAM(s) Oral daily    PRN Inpatient Medications  polyethylene glycol 3350 17 Gram(s) Oral daily PRN      REVIEW OF SYSTEMS  --------------------------------------------------------------------------------    Gen: denies fevers/chills,  CVS: denies chest pain/palpitations  Resp: denies SOB/Cough  GI: Denies N/V/Abd pain  : Denies dysuria    VITALS/PHYSICAL EXAM  --------------------------------------------------------------------------------  T(C): 36.8 (11-18-23 @ 03:11), Max: 36.8 (11-18-23 @ 03:11)  HR: 75 (11-18-23 @ 03:11) (65 - 75)  BP: 94/53 (11-18-23 @ 03:11) (92/58 - 95/59)  RR: 17 (11-18-23 @ 03:11) (17 - 18)  SpO2: 95% (11-18-23 @ 03:11) (95% - 96%)  Wt(kg): --        11-17-23 @ 07:01  -  11-18-23 @ 07:00  --------------------------------------------------------  IN: 630 mL / OUT: 400 mL / NET: 230 mL      Physical Exam:  	  	Gen: ill appearing male on O2  	Pulm: decrease bs  no rales or ronchi or wheezing  	CV: +JVD. RRR, S1S2; no rub  	Back: No CVA tenderness; no sacral edema  	Abd: +BS, soft, nontender/nondistended  	: ?suprapubic distention  	UE: Warm, no cyanosis  no clubbing,  no edema;  	LE: Warm, no cyanosis  no clubbing, B/L 1+ pedal edema    LABS/STUDIES  --------------------------------------------------------------------------------              10.0   7.13  >-----------<  232      [11-18-23 @ 05:30]              33.0     138  |  105  |  65  ----------------------------<  89      [11-18-23 @ 05:30]  4.0   |  22  |  3.30        Ca     8.3     [11-18-23 @ 05:30]      Mg     2.3     [11-17-23 @ 06:20]      PT/INR: PT 26.0 , INR 2.43       [11-18-23 @ 05:30]      Creatinine Trend:  SCr 3.30 [11-18 @ 05:30]  SCr 3.31 [11-17 @ 06:20]  SCr 3.28 [11-16 @ 06:57]  SCr 3.19 [11-15 @ 06:39]  SCr 3.12 [11-14 @ 05:41]          Iron 22, TIBC 255, %sat 8      [11-15-23 @ 06:39]  Ferritin 72      [11-15-23 @ 06:39]  TSH 0.34      [11-10-23 @ 06:32]  Lipid: chol 167, TG 80, HDL 52, LDL --      [10-17-23 @ 08:16]    Syphilis Screen (Treponema Pallidum Ab) Negative      [10-29-23 @ 00:03]

## 2023-11-18 NOTE — PROGRESS NOTE ADULT - ASSESSMENT
58M h/o prior CVA (2012) with mild L sided weakness, Mobitz II s/p Medtronic dual chamber ICD (12/21/22) DM2, HTN, HLD, admitted in CCU as a transfer from [facility] for NSTEMI on 10/13/23. His hospital course has been complicated by acute systolic dysfunction, LV thrombus, and possible acute stroke, s/p [facility] with triple vessel disease and MRI brain with significant stenosis, initially scheduled for angiogram but now with LAURA

## 2023-11-19 LAB
ALBUMIN SERPL ELPH-MCNC: 3.3 G/DL — SIGNIFICANT CHANGE UP (ref 3.3–5)
ALBUMIN SERPL ELPH-MCNC: 3.3 G/DL — SIGNIFICANT CHANGE UP (ref 3.3–5)
ALP SERPL-CCNC: 135 U/L — HIGH (ref 40–120)
ALP SERPL-CCNC: 135 U/L — HIGH (ref 40–120)
ALT FLD-CCNC: 88 U/L — HIGH (ref 10–45)
ALT FLD-CCNC: 88 U/L — HIGH (ref 10–45)
ANION GAP SERPL CALC-SCNC: 13 MMOL/L — SIGNIFICANT CHANGE UP (ref 5–17)
ANION GAP SERPL CALC-SCNC: 13 MMOL/L — SIGNIFICANT CHANGE UP (ref 5–17)
AST SERPL-CCNC: 40 U/L — SIGNIFICANT CHANGE UP (ref 10–40)
AST SERPL-CCNC: 40 U/L — SIGNIFICANT CHANGE UP (ref 10–40)
BILIRUB SERPL-MCNC: 0.3 MG/DL — SIGNIFICANT CHANGE UP (ref 0.2–1.2)
BILIRUB SERPL-MCNC: 0.3 MG/DL — SIGNIFICANT CHANGE UP (ref 0.2–1.2)
BUN SERPL-MCNC: 64 MG/DL — HIGH (ref 7–23)
BUN SERPL-MCNC: 64 MG/DL — HIGH (ref 7–23)
BUN SERPL-MCNC: 73 MG/DL — HIGH (ref 7–23)
BUN SERPL-MCNC: 73 MG/DL — HIGH (ref 7–23)
CALCIUM SERPL-MCNC: 8 MG/DL — LOW (ref 8.4–10.5)
CALCIUM SERPL-MCNC: 8 MG/DL — LOW (ref 8.4–10.5)
CALCIUM SERPL-MCNC: 8.4 MG/DL — SIGNIFICANT CHANGE UP (ref 8.4–10.5)
CALCIUM SERPL-MCNC: 8.4 MG/DL — SIGNIFICANT CHANGE UP (ref 8.4–10.5)
CHLORIDE SERPL-SCNC: 101 MMOL/L — SIGNIFICANT CHANGE UP (ref 96–108)
CHLORIDE SERPL-SCNC: 101 MMOL/L — SIGNIFICANT CHANGE UP (ref 96–108)
CHLORIDE SERPL-SCNC: 102 MMOL/L — SIGNIFICANT CHANGE UP (ref 96–108)
CHLORIDE SERPL-SCNC: 102 MMOL/L — SIGNIFICANT CHANGE UP (ref 96–108)
CO2 SERPL-SCNC: 20 MMOL/L — LOW (ref 22–31)
CO2 SERPL-SCNC: 20 MMOL/L — LOW (ref 22–31)
CO2 SERPL-SCNC: 21 MMOL/L — LOW (ref 22–31)
CO2 SERPL-SCNC: 21 MMOL/L — LOW (ref 22–31)
CREAT SERPL-MCNC: 3.08 MG/DL — HIGH (ref 0.5–1.3)
CREAT SERPL-MCNC: 3.08 MG/DL — HIGH (ref 0.5–1.3)
CREAT SERPL-MCNC: 3.59 MG/DL — HIGH (ref 0.5–1.3)
CREAT SERPL-MCNC: 3.59 MG/DL — HIGH (ref 0.5–1.3)
EGFR: 19 ML/MIN/1.73M2 — LOW
EGFR: 19 ML/MIN/1.73M2 — LOW
EGFR: 23 ML/MIN/1.73M2 — LOW
EGFR: 23 ML/MIN/1.73M2 — LOW
GAS PNL BLDA: SIGNIFICANT CHANGE UP
GAS PNL BLDA: SIGNIFICANT CHANGE UP
GLUCOSE BLDC GLUCOMTR-MCNC: 127 MG/DL — HIGH (ref 70–99)
GLUCOSE BLDC GLUCOMTR-MCNC: 127 MG/DL — HIGH (ref 70–99)
GLUCOSE BLDC GLUCOMTR-MCNC: 210 MG/DL — HIGH (ref 70–99)
GLUCOSE BLDC GLUCOMTR-MCNC: 210 MG/DL — HIGH (ref 70–99)
GLUCOSE BLDC GLUCOMTR-MCNC: 230 MG/DL — HIGH (ref 70–99)
GLUCOSE BLDC GLUCOMTR-MCNC: 230 MG/DL — HIGH (ref 70–99)
GLUCOSE BLDC GLUCOMTR-MCNC: 301 MG/DL — HIGH (ref 70–99)
GLUCOSE BLDC GLUCOMTR-MCNC: 301 MG/DL — HIGH (ref 70–99)
GLUCOSE BLDC GLUCOMTR-MCNC: 95 MG/DL — SIGNIFICANT CHANGE UP (ref 70–99)
GLUCOSE BLDC GLUCOMTR-MCNC: 95 MG/DL — SIGNIFICANT CHANGE UP (ref 70–99)
GLUCOSE SERPL-MCNC: 141 MG/DL — HIGH (ref 70–99)
GLUCOSE SERPL-MCNC: 141 MG/DL — HIGH (ref 70–99)
GLUCOSE SERPL-MCNC: 226 MG/DL — HIGH (ref 70–99)
GLUCOSE SERPL-MCNC: 226 MG/DL — HIGH (ref 70–99)
HCT VFR BLD CALC: 34.1 % — LOW (ref 39–50)
HCT VFR BLD CALC: 34.1 % — LOW (ref 39–50)
HGB BLD-MCNC: 10.3 G/DL — LOW (ref 13–17)
HGB BLD-MCNC: 10.3 G/DL — LOW (ref 13–17)
INR BLD: 2.85 RATIO — HIGH (ref 0.85–1.18)
INR BLD: 2.85 RATIO — HIGH (ref 0.85–1.18)
MAGNESIUM SERPL-MCNC: 2.4 MG/DL — SIGNIFICANT CHANGE UP (ref 1.6–2.6)
MAGNESIUM SERPL-MCNC: 2.4 MG/DL — SIGNIFICANT CHANGE UP (ref 1.6–2.6)
MCHC RBC-ENTMCNC: 24.3 PG — LOW (ref 27–34)
MCHC RBC-ENTMCNC: 24.3 PG — LOW (ref 27–34)
MCHC RBC-ENTMCNC: 30.2 GM/DL — LOW (ref 32–36)
MCHC RBC-ENTMCNC: 30.2 GM/DL — LOW (ref 32–36)
MCV RBC AUTO: 80.4 FL — SIGNIFICANT CHANGE UP (ref 80–100)
MCV RBC AUTO: 80.4 FL — SIGNIFICANT CHANGE UP (ref 80–100)
NRBC # BLD: 0 /100 WBCS — SIGNIFICANT CHANGE UP (ref 0–0)
NRBC # BLD: 0 /100 WBCS — SIGNIFICANT CHANGE UP (ref 0–0)
PHOSPHATE SERPL-MCNC: 5.4 MG/DL — HIGH (ref 2.5–4.5)
PHOSPHATE SERPL-MCNC: 5.4 MG/DL — HIGH (ref 2.5–4.5)
PLATELET # BLD AUTO: 215 K/UL — SIGNIFICANT CHANGE UP (ref 150–400)
PLATELET # BLD AUTO: 215 K/UL — SIGNIFICANT CHANGE UP (ref 150–400)
POTASSIUM SERPL-MCNC: 3.9 MMOL/L — SIGNIFICANT CHANGE UP (ref 3.5–5.3)
POTASSIUM SERPL-MCNC: 3.9 MMOL/L — SIGNIFICANT CHANGE UP (ref 3.5–5.3)
POTASSIUM SERPL-MCNC: 4.9 MMOL/L — SIGNIFICANT CHANGE UP (ref 3.5–5.3)
POTASSIUM SERPL-MCNC: 4.9 MMOL/L — SIGNIFICANT CHANGE UP (ref 3.5–5.3)
POTASSIUM SERPL-SCNC: 3.9 MMOL/L — SIGNIFICANT CHANGE UP (ref 3.5–5.3)
POTASSIUM SERPL-SCNC: 3.9 MMOL/L — SIGNIFICANT CHANGE UP (ref 3.5–5.3)
POTASSIUM SERPL-SCNC: 4.9 MMOL/L — SIGNIFICANT CHANGE UP (ref 3.5–5.3)
POTASSIUM SERPL-SCNC: 4.9 MMOL/L — SIGNIFICANT CHANGE UP (ref 3.5–5.3)
PROT SERPL-MCNC: 6.1 G/DL — SIGNIFICANT CHANGE UP (ref 6–8.3)
PROT SERPL-MCNC: 6.1 G/DL — SIGNIFICANT CHANGE UP (ref 6–8.3)
PROTHROM AB SERPL-ACNC: 29 SEC — HIGH (ref 9.5–13)
PROTHROM AB SERPL-ACNC: 29 SEC — HIGH (ref 9.5–13)
RBC # BLD: 4.24 M/UL — SIGNIFICANT CHANGE UP (ref 4.2–5.8)
RBC # BLD: 4.24 M/UL — SIGNIFICANT CHANGE UP (ref 4.2–5.8)
RBC # FLD: 16.1 % — HIGH (ref 10.3–14.5)
RBC # FLD: 16.1 % — HIGH (ref 10.3–14.5)
SARS-COV-2 RNA SPEC QL NAA+PROBE: SIGNIFICANT CHANGE UP
SARS-COV-2 RNA SPEC QL NAA+PROBE: SIGNIFICANT CHANGE UP
SODIUM SERPL-SCNC: 134 MMOL/L — LOW (ref 135–145)
SODIUM SERPL-SCNC: 134 MMOL/L — LOW (ref 135–145)
SODIUM SERPL-SCNC: 136 MMOL/L — SIGNIFICANT CHANGE UP (ref 135–145)
SODIUM SERPL-SCNC: 136 MMOL/L — SIGNIFICANT CHANGE UP (ref 135–145)
WBC # BLD: 7.9 K/UL — SIGNIFICANT CHANGE UP (ref 3.8–10.5)
WBC # BLD: 7.9 K/UL — SIGNIFICANT CHANGE UP (ref 3.8–10.5)
WBC # FLD AUTO: 7.9 K/UL — SIGNIFICANT CHANGE UP (ref 3.8–10.5)
WBC # FLD AUTO: 7.9 K/UL — SIGNIFICANT CHANGE UP (ref 3.8–10.5)

## 2023-11-19 PROCEDURE — 99232 SBSQ HOSP IP/OBS MODERATE 35: CPT

## 2023-11-19 PROCEDURE — 71045 X-RAY EXAM CHEST 1 VIEW: CPT | Mod: 26

## 2023-11-19 RX ORDER — IPRATROPIUM/ALBUTEROL SULFATE 18-103MCG
3 AEROSOL WITH ADAPTER (GRAM) INHALATION EVERY 6 HOURS
Refills: 0 | Status: DISCONTINUED | OUTPATIENT
Start: 2023-11-19 | End: 2023-11-21

## 2023-11-19 RX ORDER — ONDANSETRON 8 MG/1
4 TABLET, FILM COATED ORAL ONCE
Refills: 0 | Status: COMPLETED | OUTPATIENT
Start: 2023-11-19 | End: 2023-11-19

## 2023-11-19 RX ORDER — FUROSEMIDE 40 MG
80 TABLET ORAL ONCE
Refills: 0 | Status: COMPLETED | OUTPATIENT
Start: 2023-11-19 | End: 2023-11-19

## 2023-11-19 RX ORDER — WARFARIN SODIUM 2.5 MG/1
3 TABLET ORAL ONCE
Refills: 0 | Status: COMPLETED | OUTPATIENT
Start: 2023-11-19 | End: 2023-11-19

## 2023-11-19 RX ADMIN — AMIODARONE HYDROCHLORIDE 200 MILLIGRAM(S): 400 TABLET ORAL at 05:21

## 2023-11-19 RX ADMIN — ISOSORBIDE DINITRATE 20 MILLIGRAM(S): 5 TABLET ORAL at 17:48

## 2023-11-19 RX ADMIN — Medication 20 MILLIGRAM(S): at 12:20

## 2023-11-19 RX ADMIN — IRON SUCROSE 200 MILLIGRAM(S): 20 INJECTION, SOLUTION INTRAVENOUS at 09:20

## 2023-11-19 RX ADMIN — ONDANSETRON 4 MILLIGRAM(S): 8 TABLET, FILM COATED ORAL at 14:50

## 2023-11-19 RX ADMIN — Medication 25 MILLIGRAM(S): at 05:21

## 2023-11-19 RX ADMIN — Medication 6 UNIT(S): at 08:05

## 2023-11-19 RX ADMIN — Medication 50 MILLIGRAM(S): at 21:07

## 2023-11-19 RX ADMIN — Medication 4: at 17:48

## 2023-11-19 RX ADMIN — Medication 2: at 11:55

## 2023-11-19 RX ADMIN — Medication 80 MILLIGRAM(S): at 22:05

## 2023-11-19 RX ADMIN — Medication 3 MILLILITER(S): at 21:08

## 2023-11-19 RX ADMIN — SENNA PLUS 2 TABLET(S): 8.6 TABLET ORAL at 21:07

## 2023-11-19 RX ADMIN — TAMSULOSIN HYDROCHLORIDE 0.4 MILLIGRAM(S): 0.4 CAPSULE ORAL at 21:08

## 2023-11-19 RX ADMIN — ATORVASTATIN CALCIUM 80 MILLIGRAM(S): 80 TABLET, FILM COATED ORAL at 21:07

## 2023-11-19 RX ADMIN — CHLORHEXIDINE GLUCONATE 1 APPLICATION(S): 213 SOLUTION TOPICAL at 05:21

## 2023-11-19 RX ADMIN — PANTOPRAZOLE SODIUM 40 MILLIGRAM(S): 20 TABLET, DELAYED RELEASE ORAL at 05:21

## 2023-11-19 RX ADMIN — Medication 81 MILLIGRAM(S): at 11:55

## 2023-11-19 RX ADMIN — WARFARIN SODIUM 3 MILLIGRAM(S): 2.5 TABLET ORAL at 21:09

## 2023-11-19 RX ADMIN — INSULIN GLARGINE 15 UNIT(S): 100 INJECTION, SOLUTION SUBCUTANEOUS at 21:38

## 2023-11-19 NOTE — PROGRESS NOTE ADULT - ASSESSMENT
58M PMH prior CVA (2012) with mild L sided weakness, Mobitz II s/p Medtronic dual chamber ICD (12/21/22) DM2, HTN, HLD, admitted in CCU as a transfer from [facility] for NSTEMI on 10/13/23. His hospital course has been complicated by acute systolic dysfunction, LV thrombus, and possible acute stroke, s/p [facility] with triple vessel disease and MRI brain with significant stenosis, initially scheduled for angiogram but now with LAURA and procedure on hold.

## 2023-11-19 NOTE — PROVIDER CONTACT NOTE (FALL NOTIFICATION) - ASSESSMENT
Staff RN called into the patient's room by the CSA, on arrival to the unit found patient lying on the floor parallel to the bed. Pt & Spouse denies any injury and fall at this time. Spouse said I just lowered him to the floor as I could not hold his weight. VSS.

## 2023-11-19 NOTE — PROGRESS NOTE ADULT - PROBLEM SELECTOR PLAN 2
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 Neurology following and consulted neurosurgery, with plans for angio on Monday-Tuesday.   Medically, patient is high risk for low risk procedure, however unclear if further optimization can be performed due to severe chronic cardiac disease.  - given LAURA, hold off for now  - Will revisit issue following improvement in LAURA as described above

## 2023-11-19 NOTE — PROVIDER CONTACT NOTE (FALL NOTIFICATION) - RECOMMENDATIONS
CIARRA Cottrell at the bedside and assisted RN's in getting patient back to the bed. Pt denies LOC/Head trauma and moving all extremities without any pain or grimace.

## 2023-11-19 NOTE — PROVIDER CONTACT NOTE (FALL NOTIFICATION) - SITUATION
Per spouse, pt ambulated with spouse using walker to the bathroom. While returning to the bed patient lost his balance and spouse assisted him to sit on the floor.

## 2023-11-19 NOTE — PROVIDER CONTACT NOTE (FALL NOTIFICATION) - BACKGROUND
Pt admitted with NSTEMI s/p C showing MVD- Not a candidate for CABG, likely not a candidate for PCI as per EP. Acute HFrEF likely from MVD- c/w GDMT per HF.

## 2023-11-19 NOTE — CHART NOTE - NSCHARTNOTEFT_GEN_A_CORE
MEDICINE NP    Notified by RN patient with Respiratory distress. Seen and examined patient at bedside. Patient is alert, Respiratory distress. Denies HA, CP, cough, N/V, or abd pain. Found patient in bed on nasal canula using  accessory muscles. patient placed on bipap and blood gas ordered. Patient is being followed by HF, called cardiology of [facility]. cardiology suggest Lasix for diuresing and monitor output for first 2 hours and further planning once patient's response to lasix is noted. patient is on bipap placed fracnis for urine output.     VITAL SIGNS:  T(C): 36.8 (11-19-23 @ 20:58), Max: 37 (11-19-23 @ 11:40)  HR: 83 (11-19-23 @ 20:58) (83 - 96)  BP: 106/71 (11-19-23 @ 21:51) (86/52 - 106/71)  RR: 20 (11-19-23 @ 21:51) (17 - 20)  SpO2: 91% (11-19-23 @ 21:51) (86% - 97%)  Wt(kg): --      LABORATORY:                          10.3   7.90  )-----------( 215      ( 19 Nov 2023 06:41 )             34.1       11-19    134<L>  |  101  |  73<H>  ----------------------------<  226<H>  4.9   |  20<L>  |  3.59<H>    Ca    8.4      19 Nov 2023 22:23  Phos  5.4     11-19  Mg     2.4     11-19    TPro  6.1  /  Alb  3.3  /  TBili  0.3  /  DBili  x   /  AST  40  /  ALT  88<H>  /  AlkPhos  135<H>  11-19          MICROBIOLOGY:           RADIOLOGY:    CXR prelim shows Pulmonary edema      PHYSICAL EXAM:    Constitutional: AOx3.    Respiratory: Increased work of breathing, rhonchi, or crackles.    Cardiovascular: S1 S2. No murmurs.    Gastrointestinal: BS X4 active. soft. nontender.    Extremities/Vascular: +2 pulses bilaterally. No BLE edema.      ASSESSMENT/PLAN:   HPI:  Patient with separate chart from [facility], MRN# [id number]    59 yo Male pmhx CVA with mild left sided deficits, HTN, DM2, vertigo, HLD, Mobitz II s/pp Medtronic dual chamber ICD (12/21/22) initially presented to [facility] from home with complaints of dyspnea and chest pain and was admitted w/ acute hypoxic respiratory failure likely due to acute pulmonary edema due to acute HFrEF in setting of acute NSTEMI, LAURA and enterovirus infection. Pt with brief MICU stay at [facility] requiring bipap (no intubation), was treated for PNA with cefepime, and was found to have TTE done 9/26/23 showing EF 20% with severely decreased LVSF, multiple regional wall motion abnormalities, and elevated LV end diastolic pressure. Pt was transferred to [facility] for cardiac cath evaluation. Patient without any acute complaints, complaining of intermittent palpitations for the last 2 days. No chest pain, SOB, fevers, nausea, vomiting, abdominal pain.  Now with respiratory distress      1) Increased work of breathing  -Continuous pulseox  _bipap  -CXR   2) Pulmonary edema  -Blood gas  -Lasix 80mg  -cardiology called for rec  -Francis catheter monitor I/O  -F/U primary team in AM

## 2023-11-19 NOTE — PROGRESS NOTE ADULT - SUBJECTIVE AND OBJECTIVE BOX
[facility] Division of Hospital Medicine  Bernard Montelongo MD  Available via MS Teams    SUBJECTIVE / OVERNIGHT EVENTS: Patient seen and examined at bedside, resting comfortably and in no acute distress. Denies chest pain, palpitations. Denies shortness of breath or cough. Patient reports mild dizziness when standing, however otherwise is without complaints. ROS otherwise noncontributory.     MEDICATIONS  (STANDING):  aMIOdarone    Tablet   Oral   aMIOdarone    Tablet 200 milliGRAM(s) Oral daily  aspirin enteric coated 81 milliGRAM(s) Oral daily  atorvastatin 80 milliGRAM(s) Oral at bedtime  chlorhexidine 2% Cloths 1 Application(s) Topical <User Schedule>  hydrALAZINE 50 milliGRAM(s) Oral every 8 hours  insulin glargine Injectable (LANTUS) 15 Unit(s) SubCutaneous at bedtime  insulin lispro (ADMELOG) corrective regimen sliding scale   SubCutaneous three times a day before meals  insulin lispro (ADMELOG) corrective regimen sliding scale   SubCutaneous at bedtime  insulin lispro Injectable (ADMELOG) 6 Unit(s) SubCutaneous three times a day before meals  iron sucrose Injectable 200 milliGRAM(s) IV Push every 24 hours  isosorbide   dinitrate Tablet (ISORDIL) 20 milliGRAM(s) Oral three times a day  metoprolol succinate ER 25 milliGRAM(s) Oral daily  pantoprazole    Tablet 40 milliGRAM(s) Oral before breakfast  senna 2 Tablet(s) Oral at bedtime  tamsulosin 0.4 milliGRAM(s) Oral at bedtime  torsemide 20 milliGRAM(s) Oral daily    MEDICATIONS  (PRN):  polyethylene glycol 3350 17 Gram(s) Oral daily PRN Constipation      I&O's Summary    18 Nov 2023 07:01  -  19 Nov 2023 07:00  --------------------------------------------------------  IN: 586 mL / OUT: 350 mL / NET: 236 mL    19 Nov 2023 07:01  -  19 Nov 2023 11:26  --------------------------------------------------------  IN: 240 mL / OUT: 400 mL / NET: -160 mL        PHYSICAL EXAM:  Vital Signs Last 24 Hrs  T(C): 36.6 (19 Nov 2023 04:40), Max: 37.2 (18 Nov 2023 20:49)  T(F): 97.9 (19 Nov 2023 04:40), Max: 99 (18 Nov 2023 20:49)  HR: 86 (19 Nov 2023 08:02) (75 - 96)  BP: 95/60 (19 Nov 2023 08:02) (86/52 - 95/60)  BP(mean): --  RR: 17 (19 Nov 2023 04:40) (17 - 18)  SpO2: 97% (19 Nov 2023 04:40) (97% - 99%)    Parameters below as of 19 Nov 2023 04:40  Patient On (Oxygen Delivery Method): nasal cannula  O2 Flow (L/min): 2    CONSTITUTIONAL: NAD, well-groomed  EYES: PERRLA; conjunctiva and sclera clear  ENMT: Moist oral mucosa, no pharyngeal injection or exudates; normal dentition  NECK: Supple, no palpable masses; no thyromegaly  RESPIRATORY: Normal respiratory effort; lungs are clear to auscultation bilaterally  CARDIOVASCULAR: normal S1 and S2, no murmur/rub/gallop; No lower extremity edema  ABDOMEN: Nontender to palpation, normoactive bowel sounds, no rebound/guarding; No hepatosplenomegaly  MUSCULOSKELETAL:  no clubbing or cyanosis of digits; no joint swelling or tenderness to palpation  PSYCH: A+O to person, place, and time; affect appropriate  NEUROLOGY: CN 2-12 are intact and symmetric; no gross sensory deficits   SKIN: No rashes; no palpable lesions    LABS:                        10.3   7.90  )-----------( 215      ( 19 Nov 2023 06:41 )             34.1     11-19    136  |  102  |  64<H>  ----------------------------<  141<H>  3.9   |  21<L>  |  3.08<H>    Ca    8.0<L>      19 Nov 2023 06:40      PT/INR - ( 19 Nov 2023 06:42 )   PT: 29.0 sec;   INR: 2.85 ratio               Urinalysis Basic - ( 19 Nov 2023 06:40 )    Color: x / Appearance: x / SG: x / pH: x  Gluc: 141 mg/dL / Ketone: x  / Bili: x / Urobili: x   Blood: x / Protein: x / Nitrite: x   Leuk Esterase: x / RBC: x / WBC x   Sq Epi: x / Non Sq Epi: x / Bacteria: x        COVID-19 PCR: NotDetec (19 Nov 2023 05:50)  COVID-19 PCR: NotDetec (15 Nov 2023 09:01)  SARS-CoV-2: NotDetec (06 Nov 2023 19:17)  SARS-CoV-2: NotDetec (03 Nov 2023 18:25)  SARS-CoV-2: NotDetec (24 Oct 2023 07:17)  SARS-CoV-2: NotDetec (20 Oct 2023 09:18)  COVID-19 PCR: NotDetec (14 Oct 2023 11:34)

## 2023-11-19 NOTE — PROVIDER CONTACT NOTE (FALL NOTIFICATION) - ACTION/TREATMENT ORDERED:
NP at the bedside and aware. No further intervention required at this time. Continue to monitor and safety maintained.

## 2023-11-20 LAB
ANION GAP SERPL CALC-SCNC: 13 MMOL/L — SIGNIFICANT CHANGE UP (ref 5–17)
ANION GAP SERPL CALC-SCNC: 13 MMOL/L — SIGNIFICANT CHANGE UP (ref 5–17)
BUN SERPL-MCNC: 73 MG/DL — HIGH (ref 7–23)
BUN SERPL-MCNC: 73 MG/DL — HIGH (ref 7–23)
CALCIUM SERPL-MCNC: 8.5 MG/DL — SIGNIFICANT CHANGE UP (ref 8.4–10.5)
CALCIUM SERPL-MCNC: 8.5 MG/DL — SIGNIFICANT CHANGE UP (ref 8.4–10.5)
CHLORIDE SERPL-SCNC: 102 MMOL/L — SIGNIFICANT CHANGE UP (ref 96–108)
CHLORIDE SERPL-SCNC: 102 MMOL/L — SIGNIFICANT CHANGE UP (ref 96–108)
CO2 SERPL-SCNC: 19 MMOL/L — LOW (ref 22–31)
CO2 SERPL-SCNC: 19 MMOL/L — LOW (ref 22–31)
CREAT SERPL-MCNC: 3.76 MG/DL — HIGH (ref 0.5–1.3)
CREAT SERPL-MCNC: 3.76 MG/DL — HIGH (ref 0.5–1.3)
EGFR: 18 ML/MIN/1.73M2 — LOW
EGFR: 18 ML/MIN/1.73M2 — LOW
GLUCOSE BLDC GLUCOMTR-MCNC: 164 MG/DL — HIGH (ref 70–99)
GLUCOSE BLDC GLUCOMTR-MCNC: 164 MG/DL — HIGH (ref 70–99)
GLUCOSE BLDC GLUCOMTR-MCNC: 165 MG/DL — HIGH (ref 70–99)
GLUCOSE BLDC GLUCOMTR-MCNC: 165 MG/DL — HIGH (ref 70–99)
GLUCOSE BLDC GLUCOMTR-MCNC: 178 MG/DL — HIGH (ref 70–99)
GLUCOSE BLDC GLUCOMTR-MCNC: 178 MG/DL — HIGH (ref 70–99)
GLUCOSE BLDC GLUCOMTR-MCNC: 212 MG/DL — HIGH (ref 70–99)
GLUCOSE BLDC GLUCOMTR-MCNC: 212 MG/DL — HIGH (ref 70–99)
GLUCOSE BLDC GLUCOMTR-MCNC: 213 MG/DL — HIGH (ref 70–99)
GLUCOSE BLDC GLUCOMTR-MCNC: 213 MG/DL — HIGH (ref 70–99)
GLUCOSE SERPL-MCNC: 169 MG/DL — HIGH (ref 70–99)
GLUCOSE SERPL-MCNC: 169 MG/DL — HIGH (ref 70–99)
HCT VFR BLD CALC: 37 % — LOW (ref 39–50)
HCT VFR BLD CALC: 37 % — LOW (ref 39–50)
HGB BLD-MCNC: 11 G/DL — LOW (ref 13–17)
HGB BLD-MCNC: 11 G/DL — LOW (ref 13–17)
INR BLD: 2.81 RATIO — HIGH (ref 0.85–1.18)
INR BLD: 2.81 RATIO — HIGH (ref 0.85–1.18)
MAGNESIUM SERPL-MCNC: 2.3 MG/DL — SIGNIFICANT CHANGE UP (ref 1.6–2.6)
MAGNESIUM SERPL-MCNC: 2.3 MG/DL — SIGNIFICANT CHANGE UP (ref 1.6–2.6)
MCHC RBC-ENTMCNC: 24.1 PG — LOW (ref 27–34)
MCHC RBC-ENTMCNC: 24.1 PG — LOW (ref 27–34)
MCHC RBC-ENTMCNC: 29.7 GM/DL — LOW (ref 32–36)
MCHC RBC-ENTMCNC: 29.7 GM/DL — LOW (ref 32–36)
MCV RBC AUTO: 81.1 FL — SIGNIFICANT CHANGE UP (ref 80–100)
MCV RBC AUTO: 81.1 FL — SIGNIFICANT CHANGE UP (ref 80–100)
NRBC # BLD: 0 /100 WBCS — SIGNIFICANT CHANGE UP (ref 0–0)
NRBC # BLD: 0 /100 WBCS — SIGNIFICANT CHANGE UP (ref 0–0)
PLATELET # BLD AUTO: 248 K/UL — SIGNIFICANT CHANGE UP (ref 150–400)
PLATELET # BLD AUTO: 248 K/UL — SIGNIFICANT CHANGE UP (ref 150–400)
POTASSIUM SERPL-MCNC: 4.4 MMOL/L — SIGNIFICANT CHANGE UP (ref 3.5–5.3)
POTASSIUM SERPL-MCNC: 4.4 MMOL/L — SIGNIFICANT CHANGE UP (ref 3.5–5.3)
POTASSIUM SERPL-SCNC: 4.4 MMOL/L — SIGNIFICANT CHANGE UP (ref 3.5–5.3)
POTASSIUM SERPL-SCNC: 4.4 MMOL/L — SIGNIFICANT CHANGE UP (ref 3.5–5.3)
PROTHROM AB SERPL-ACNC: 30 SEC — HIGH (ref 9.5–13)
PROTHROM AB SERPL-ACNC: 30 SEC — HIGH (ref 9.5–13)
RBC # BLD: 4.56 M/UL — SIGNIFICANT CHANGE UP (ref 4.2–5.8)
RBC # BLD: 4.56 M/UL — SIGNIFICANT CHANGE UP (ref 4.2–5.8)
RBC # FLD: 16.5 % — HIGH (ref 10.3–14.5)
RBC # FLD: 16.5 % — HIGH (ref 10.3–14.5)
SODIUM SERPL-SCNC: 134 MMOL/L — LOW (ref 135–145)
SODIUM SERPL-SCNC: 134 MMOL/L — LOW (ref 135–145)
WBC # BLD: 9.17 K/UL — SIGNIFICANT CHANGE UP (ref 3.8–10.5)
WBC # BLD: 9.17 K/UL — SIGNIFICANT CHANGE UP (ref 3.8–10.5)
WBC # FLD AUTO: 9.17 K/UL — SIGNIFICANT CHANGE UP (ref 3.8–10.5)
WBC # FLD AUTO: 9.17 K/UL — SIGNIFICANT CHANGE UP (ref 3.8–10.5)

## 2023-11-20 PROCEDURE — 99233 SBSQ HOSP IP/OBS HIGH 50: CPT

## 2023-11-20 PROCEDURE — 99232 SBSQ HOSP IP/OBS MODERATE 35: CPT

## 2023-11-20 RX ORDER — BUMETANIDE 0.25 MG/ML
3 INJECTION INTRAMUSCULAR; INTRAVENOUS ONCE
Refills: 0 | Status: COMPLETED | OUTPATIENT
Start: 2023-11-20 | End: 2023-11-20

## 2023-11-20 RX ORDER — FUROSEMIDE 40 MG
40 TABLET ORAL ONCE
Refills: 0 | Status: COMPLETED | OUTPATIENT
Start: 2023-11-20 | End: 2023-11-20

## 2023-11-20 RX ORDER — BUMETANIDE 0.25 MG/ML
2 INJECTION INTRAMUSCULAR; INTRAVENOUS EVERY 12 HOURS
Refills: 0 | Status: DISCONTINUED | OUTPATIENT
Start: 2023-11-20 | End: 2023-11-20

## 2023-11-20 RX ORDER — BUMETANIDE 0.25 MG/ML
3 INJECTION INTRAMUSCULAR; INTRAVENOUS ONCE
Refills: 0 | Status: DISCONTINUED | OUTPATIENT
Start: 2023-11-20 | End: 2023-11-20

## 2023-11-20 RX ORDER — WARFARIN SODIUM 2.5 MG/1
3 TABLET ORAL ONCE
Refills: 0 | Status: COMPLETED | OUTPATIENT
Start: 2023-11-20 | End: 2023-11-20

## 2023-11-20 RX ORDER — FUROSEMIDE 40 MG
10 TABLET ORAL
Qty: 500 | Refills: 0 | Status: DISCONTINUED | OUTPATIENT
Start: 2023-11-20 | End: 2023-11-21

## 2023-11-20 RX ADMIN — Medication 5 MG/HR: at 13:53

## 2023-11-20 RX ADMIN — AMIODARONE HYDROCHLORIDE 200 MILLIGRAM(S): 400 TABLET ORAL at 05:40

## 2023-11-20 RX ADMIN — ISOSORBIDE DINITRATE 20 MILLIGRAM(S): 5 TABLET ORAL at 05:39

## 2023-11-20 RX ADMIN — TAMSULOSIN HYDROCHLORIDE 0.4 MILLIGRAM(S): 0.4 CAPSULE ORAL at 21:33

## 2023-11-20 RX ADMIN — WARFARIN SODIUM 3 MILLIGRAM(S): 2.5 TABLET ORAL at 21:34

## 2023-11-20 RX ADMIN — BUMETANIDE 124 MILLIGRAM(S): 0.25 INJECTION INTRAMUSCULAR; INTRAVENOUS at 06:29

## 2023-11-20 RX ADMIN — PANTOPRAZOLE SODIUM 40 MILLIGRAM(S): 20 TABLET, DELAYED RELEASE ORAL at 05:42

## 2023-11-20 RX ADMIN — Medication 5 MG/HR: at 21:33

## 2023-11-20 RX ADMIN — SENNA PLUS 2 TABLET(S): 8.6 TABLET ORAL at 21:35

## 2023-11-20 RX ADMIN — Medication 1: at 13:34

## 2023-11-20 RX ADMIN — Medication 6 UNIT(S): at 17:22

## 2023-11-20 RX ADMIN — Medication 1: at 07:23

## 2023-11-20 RX ADMIN — Medication 40 MILLIGRAM(S): at 13:53

## 2023-11-20 RX ADMIN — Medication 20 MILLIGRAM(S): at 05:40

## 2023-11-20 RX ADMIN — IRON SUCROSE 200 MILLIGRAM(S): 20 INJECTION, SOLUTION INTRAVENOUS at 07:33

## 2023-11-20 RX ADMIN — INSULIN GLARGINE 15 UNIT(S): 100 INJECTION, SOLUTION SUBCUTANEOUS at 21:34

## 2023-11-20 RX ADMIN — Medication 50 MILLIGRAM(S): at 06:29

## 2023-11-20 RX ADMIN — Medication 2: at 17:23

## 2023-11-20 RX ADMIN — CHLORHEXIDINE GLUCONATE 1 APPLICATION(S): 213 SOLUTION TOPICAL at 05:42

## 2023-11-20 RX ADMIN — Medication 25 MILLIGRAM(S): at 05:39

## 2023-11-20 RX ADMIN — Medication 50 MILLIGRAM(S): at 21:34

## 2023-11-20 RX ADMIN — ATORVASTATIN CALCIUM 80 MILLIGRAM(S): 80 TABLET, FILM COATED ORAL at 21:33

## 2023-11-20 NOTE — PROGRESS NOTE ADULT - ASSESSMENT
58M PMH prior CVA (2012) with mild L sided weakness, Mobitz II s/p Medtronic dual chamber ICD (12/21/22) DM2, HTN, HLD, admitted in CCU as a transfer from [facility] for NSTEMI on 10/13/23. His hospital course has been complicated by acute systolic dysfunction, LV thrombus, and possible acute stroke, s/p [facility] with triple vessel disease and MRI brain with significant stenosis, initially scheduled for angiogram but now with LAURA and procedure on hold. Now with acute hypoxic respiratory failure due to CHF exacerbation

## 2023-11-20 NOTE — PROGRESS NOTE ADULT - SUBJECTIVE AND OBJECTIVE BOX
Neurology        S: patient seen and examined.  no neuro changes.         Medications: MEDICATIONS  (STANDING):  aMIOdarone    Tablet   Oral   aMIOdarone    Tablet 200 milliGRAM(s) Oral daily  aspirin enteric coated 81 milliGRAM(s) Oral daily  atorvastatin 80 milliGRAM(s) Oral at bedtime  chlorhexidine 2% Cloths 1 Application(s) Topical <User Schedule>  hydrALAZINE 50 milliGRAM(s) Oral every 8 hours  insulin glargine Injectable (LANTUS) 15 Unit(s) SubCutaneous at bedtime  insulin lispro (ADMELOG) corrective regimen sliding scale   SubCutaneous three times a day before meals  insulin lispro (ADMELOG) corrective regimen sliding scale   SubCutaneous at bedtime  insulin lispro Injectable (ADMELOG) 6 Unit(s) SubCutaneous three times a day before meals  iron sucrose Injectable 200 milliGRAM(s) IV Push every 24 hours  isosorbide   dinitrate Tablet (ISORDIL) 20 milliGRAM(s) Oral three times a day  metoprolol succinate ER 25 milliGRAM(s) Oral daily  pantoprazole    Tablet 40 milliGRAM(s) Oral before breakfast  senna 2 Tablet(s) Oral at bedtime  tamsulosin 0.4 milliGRAM(s) Oral at bedtime  torsemide 20 milliGRAM(s) Oral daily    MEDICATIONS  (PRN):  albuterol/ipratropium for Nebulization 3 milliLiter(s) Nebulizer every 6 hours PRN Respiratory Distress  polyethylene glycol 3350 17 Gram(s) Oral daily PRN Constipation       Vitals:  Vital Signs Last 24 Hrs  T(C): 37 (20 Nov 2023 05:28), Max: 37 (19 Nov 2023 11:40)  T(F): 98.6 (20 Nov 2023 05:28), Max: 98.6 (19 Nov 2023 11:40)  HR: 86 (20 Nov 2023 05:28) (83 - 90)  BP: 98/60 (20 Nov 2023 05:28) (90/55 - 106/71)  BP(mean): --  RR: 18 (20 Nov 2023 06:35) (17 - 20)  SpO2: 94% (20 Nov 2023 06:35) (86% - 97%)    Parameters below as of 20 Nov 2023 06:35  Patient On (Oxygen Delivery Method): BiPAP/CPAP              General Exam:   General Appearance: Appropriately dressed and in no acute distress       Head: Normocephalic, atraumatic and no dysmorphic features  Ear, Nose, and Throat: Moist mucous membranes  CVS: S1S2+  Resp: No SOB, no wheeze or rhonchi  GI: soft NT/ND  Extremities: No edema or cyanosis  Skin: No bruises or rashes     Neurological Exam:  Mental Status: Awake, alert and oriented x 2.  Able to follow simple verbal commands. Able to name and repeat. fluent speech. No obvious aphasia or dysarthria noted. poor insight. not understanding why he is in hospital   Cranial Nerves: PERRL, EOMI, VFFC, sensation V1-V3 intact,  no obvious facial asymmetry, equal elevation of palate, scm/trap 5/5, tongue is midline on protrusion. no obvious papilledema on fundoscopic exam. hearing is grossly intact.   Motor: Normal bulk, tone and strength throughout. Fine finger movements were intact and symmetric. no tremors or drift noted.    Sensation: Intact to light touch and pinprick throughout. no right/left confusion. no extinction to tactile on DSS. Romberg was negative.   Reflexes: 1+ throughout at biceps, brachioradialis, triceps, patellars and ankles bilaterally and equal. No clonus. R toe and L toe were both downgoing.  Coordination: No dysmetria on FNF or HKS  Gait:   no limitations in gait.     Data/Labs/Imaging which I personally reviewed.          LABS:                          11.0   9.17  )-----------( 248      ( 20 Nov 2023 06:50 )             37.0     11-20    134<L>  |  102  |  73<H>  ----------------------------<  169<H>  4.4   |  19<L>  |  3.76<H>    Ca    8.5      20 Nov 2023 06:50  Phos  5.4     11-19  Mg     2.3     11-20    TPro  6.1  /  Alb  3.3  /  TBili  0.3  /  DBili  x   /  AST  40  /  ALT  88<H>  /  AlkPhos  135<H>  11-19    LIVER FUNCTIONS - ( 19 Nov 2023 22:23 )  Alb: 3.3 g/dL / Pro: 6.1 g/dL / ALK PHOS: 135 U/L / ALT: 88 U/L / AST: 40 U/L / GGT: x           PT/INR - ( 20 Nov 2023 06:50 )   PT: 30.0 sec;   INR: 2.81 ratio           Urinalysis Basic - ( 20 Nov 2023 06:50 )    Color: x / Appearance: x / SG: x / pH: x  Gluc: 169 mg/dL / Ketone: x  / Bili: x / Urobili: x   Blood: x / Protein: x / Nitrite: x   Leuk Esterase: x / RBC: x / WBC x   Sq Epi: x / Non Sq Epi: x / Bacteria: x               < from: CT Head No Cont (10.27.23 @ 18:04) >    ACC: [id number] EXAM:  CT BRAIN   ORDERED BY: BRETT HOPSON     PROCEDURE DATE:  10/27/2023          INTERPRETATION:  Clinical Indication: CVA    5mm axial sections of the brain were obtained from base to vertex,   without the intravenous administration of contrast material. Coronal and   sagittal computer generated reconstructed views are available.    No prior brain imaging is available for comparison.          The ventricles and sulci are prominent consistent with mild atrophy.   Small vesselwhite matter ischemic changes are noted. There is a infarct   in the right medial cerebellum of undetermined age which could be acute   to subacute based on its degree of lucency. There is no significant   hemorrhage. Bone window examination is unremarkable. There is been   previous right-sided lens replacement surgery.      IMPRESSION: Atrophy and small vessel white matter ischemic changes. Right   medial cerebellar infarct may be acute versus subacute. No hemorrhage.      --- End of Report ---    < from: MR Angio Head No Cont (10.30.23 @ 20:58) >    ACC: [id number] EXAM:  MR ANGIO BRAIN   ORDERED BY: NATALIE CARRANZA     ACC: [id number] EXAM:  MR ANGIO NECK   ORDERED BY: NATALIE CARRANZA     ACC: [id number] EXAM:  MR BRAIN   ORDERED BY: NAVNEET CORONADO     PROCEDURE DATE:  10/30/2023          INTERPRETATION:  .    CLINICAL INFORMATION: Stroke.    TECHNIQUE: Multiplanar multi sequential MRI examination of the brain was   performed without the administration of IV gadolinium. MRA images through   the neck and [place] of [place] were obtained usinga combination of 2-D   and 3-D time-of-flight acquisition. The data was then reformatted into a   volumetric data set and reviewed as rotational MIP images.    COMPARISON: Most recent prior CT examination of the head from 10/27/2023.   No prior MRI studies are available for comparison.    FINDINGS:    MRI Brain: A wedge-shaped area of diffusion restriction is seen within   the right PICA territory. Associated T2/FLAIR hyperintense signal is also   seen, compatible with cytotoxic edema. Mild hemorrhagic transformation is   seen within the infarct bed manifested by high signal on T1-weighted   imaging as well as susceptibility artifact on the SWI sequence. Mild mass   effect is seen without effacement of the fourth ventricle or shift of the   posterior fossa midline structures.    This is superimposed upon encephalomalacia and gliosis involving the   right cerebellar hemisphere.    An additional vague area of diffusion reduction is seen within the left   parietal periventricular white matter without associated T2/FLAIR   hyperintense signal, compatible with cytotoxic edema. No gross   hemorrhagic transformation is noted in this location.    Scattered foci of susceptibility artifact are seen within the bilateral   basal ganglia, compatible with areas of chronic microhemorrhage.    Multiple additional patchy confluent nonspecific T2/FLAIR hyperintense   signal changes are noted throughout the bihemispheric white matter   without associated mass effect or restricted diffusion.    Ventricular size and configuration is unremarkable. No abnormal   extra-axial fluid collections are seen. Flow-voids are noted throughout   the major intracranial vessels, on the T2 weighted images, consistent   with their patency. The sellar location appears unremarkable. There is an   unchanged appearing small retrocerebellar arachnoid cyst versus cisterna   magna.    A polyp versus retention cyst is seen within the right maxillary sinus.   Additional minor scattered mucosal thickening seen throughout the ethmoid   labyrinth. The tympanomastoid cavities are clear. The left orbit appears   within normal limits. There is evidence of right-sided cataract removal.    There is an indeterminate mixed signal ovoid shaped soft tissue focus   involving the left superior pinna measuring 1.4 x 0.9 cm which appears   unchanged.    MRA Neck: Examination is motion limited.    There is a standard anatomic variation to the aortic arch. The origins of   the great vessels appear grossly unremarkable.    The bilateral common carotid arteries, carotid bifurcations and cervical   internal carotid arteries appear patent. The origin of the left vertebral   artery is normal. The left vertebral artery is patent.    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    MRA [place] of [place]: Atherosclerosis affects the bilateral carotid   siphons with mild area of focal stenosis involving the right   clinoid/supraclinoid junction. There is mild hypoplasia of the right A1   segment. Otherwise, the bilateral intracranial internal carotid,   anterior, and middle cerebral arteries appear unremarkable.    The anterior and bilateral posterior communicating arteries are seen.    The right V4 segment does not demonstrate flow relatedsignal. The left   V4 segment appears unremarkable. The vertebrobasilar confluence appears   unremarkable. Long segment mild stenosis involving the mid basilar   artery. The basilar tip appears unremarkable as well as the bilateral   posterior cerebral arteries.    IMPRESSION:    MRI BRAIN:  1. Evolving acute/subacute right-sided PICA distribution infarction with   associated cytotoxic edema and very mild hemorrhagic transformation.  2. Additional wedge-shaped area of acute/subacute ischemia within the   left parietal periatrial white matter with associated cytotoxic edema.  3. Indeterminate soft tissue lesion involving the left superior pinna   measuring 1.4 x 0.9 cm. Neoplasm cannot be excluded. Recommend   correlation with direct physical examination and visualization.    MRA NECK:  1. Extremely motion limited study.  2. 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 Recommend further evaluation with a CT angiogram study   of the neck.    MRA HEAD:  1. 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 This can be further evaluated with a CT angiogram study of the   head.  2. Mild focal stenosis of the right supraclinoid intracranial internal   carotid artery secondary to atherosclerosis.  3. Otherwise, no large vessel occlusion or major stenosis.    --- End of Report ---      < from: CT Brain Stroke Protocol (11.06.23 @ 14:11) >    ACC: [id number] EXAM:  CT BRAIN STROKE PROTOCOL   ORDERED BY:  KAMILA TAVAREZ     ACC: [id number] EXAM:  CT ANGIO NECK STROKE PROTCL IC   ORDERED BY:  KAMILA TAVAREZ     ACC: [id number] EXAM:  CT ANGIO BRAIN STROKE PROTC IC   ORDERED BY:  KAMILA TAVAREZ     PROCEDURE DATE:  11/06/2023          INTERPRETATION:  Clinical Indication: Code stroke for dizziness, prior   infarct one week ago    5mm axial sections of the brain were obtained from base to vertex,   without the intravenous administration of contrast material. Coronal and   sagittal computer generated reconstructed views are available.    Comparison is made with the prior CT of 10/27/2023 and the MRI 10/30/2023.    There is moderate atrophy for the patient's age with ventricular and   sulcal prominence. Small vessel white matter ischemic changes are noted.   There is an old right medial occipital infarct. No acute hemorrhage is   identified.        After the intravenous power injection of 70 cc of Omnipaque 300 using a   bolus amol timing run serial thin sections were obtained through the   neck from the thoracic inlet through the intracranial circulation   centered at the [id number] on a multislice CT scanner reformatted   with coronal and sagittal 2 D-MIP projections, including 3 D  reconstructions using a separate 3D [employer] software workstation.A total   of  70 cc of Omnipaque were intravenously injected.  30 cc were discarded.    The origins of the common carotid arteries are normal. The origin of the   left dominant left vertebral artery demonstrates moderate stenosis   nondominant small right vertebral artery appears hypoplastic on a   congenital basis and is occluded at the V3 and V4 portion.    There is severe stenosis of the V4 segment of the left vertebral artery   just proximal to the VV junction. The basilar artery is normal. The   posterior cerebral and superior cerebellar arteries are normal.    Evaluation of the carotid arteries demonstrate normal appearance to   distal cervical, petrous, cavernous and supraclinoid internal carotid   arteries. The anterior cerebral arteries and anterior communicating   artery are visualized, the right A1 segment is hypoplastic on a   congenital basis. The middle cerebral arteries are visualized, the left   M1 segment is moderately narrowed but patent. The middle cerebral artery   vessels in the sylvian fissure unremarkable.      The normal intracranial venous circulation is identified. The right   transverse sinus is dominant. The superior sagittal sinus, internal   cerebral veins, vein of Jeremías, straight sinus, transverse sinuses,   sigmoid sinuses and internal jugular veins are normal.  Cortical veins are normal.      There are large bilateral pleural effusions and consolidation in the left   upper lobe whichis similar to a prior exam of 11/5/2023. There is a   left-sided permanent pacemaker.    IMPRESSION: Atrophy for the patient's age. Old right cerebellar infarct.   No hemorrhage. No change since 10/30/2023.    Severe stenosis of the dominant left vertebral artery at the V4 segment   and moderate stenosis at the origin. 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 Moderate left M1 stenosis.    --- End of Report ---   < from: MR Angio Neck No Cont (11.10.23 @ 15:32) >    ACC: [id number] EXAM:  MR ANGIO NECK   ORDERED BY: HERSON ANNE     ACC: [id number] EXAM:  MR BRAIN   ORDERED BY: NATALIE CARRANZA     ACC: [id number] EXAM:  MR ANGIO BRAIN   ORDERED BY: HERSON ANNE     PROCEDURE DATE:  11/10/2023          INTERPRETATION:  CLINICAL INDICATION: CT demonstrating severe stenosis of   the dominant left vertebral artery at the V4 segment and moderate   stenosis at the origin. Occlusion of the nondominant right vertebral   artery at the V3 -4 segment. Moderate left [id number].        Magnetic resonance imaging of the brain was carried out with transaxial   SPGR, FLAIR, fast spin echo T2 weighted images, axial susceptibility   weighted series, diffusion weighted series and sagittal T1 weighted   series on a 1.5 Crystal magnet.    Comparison is made with the prior CT/CTA 11/6/2023.      There is moderate atrophy for the patient's age. An old right medial   cerebellar infarct with hemosiderin staining is identified. Small vessel   white matter ischemic changes arenoted. There is a patchy area of   diffusion restriction in the left subdural white matter. Scattered foci   of hemosiderin deposition are identified in the basal ganglia bilaterally   and left parietal region as well as the old right cerebellar infarct.    A 2 D and 3-D axial noncontrast MRA were performed on the cervical and   intracranial vessels, respectively. Intravascular flow quantification was   performed using gated 2D phase contrast MR, imaged perpendicular to the   vessel axis.  Imageswere post processed NOVA software and a NOVA flow   study report is available      There is occlusion of the distal right nondominant vertebral artery.   There is severe stenosis of the V4 segment of the dominant left vertebral   artery. There is moderate left M1 stenosis.    Flow is as follows in ml per minute.    NHEY632, OMER 152, RMCA 180, RACA 19, RACA2 107    LCCA 276, LICA 193, LMCA 58, LACA 125, LACA2 68    RVA 8, LVA 93, BA 99, RPCA 32, LPCA 61    IMPRESSION: Old right medial cerebellar infarct with hemosiderin   staining. Small vessel white matter ischemic changes. Left occipital   white matter infarct with diffusion restriction. Scattered foci of   hemosiderin deposition in the basal ganglia and left parietal region.    Occluded nondominant right vertebral artery. There is stenosis V4 segment   dominant left vertebral artery. Significant left M1 stenosis using   noninvasive flow MR angiography.    --- End of Report ---            ANDREW TONEY MD; Attending Radiologist  This document has been electronically signed. Nov 10 2023  4:04PM    < end of copied text >

## 2023-11-20 NOTE — PROGRESS NOTE ADULT - SUBJECTIVE AND OBJECTIVE BOX
Andre Reyes, M.D.  Pager: [phone]  Office: [phone]    Patient is a 58y old  Male who presents with a chief complaint of NSTEMI (20 Nov 2023 08:16)          SUBJECTIVE / OVERNIGHT EVENTS:    No acute overnight events.    ROS: ( - ) Fever, ( - )Chills,  ( - )Nausea/Vomiting, ( - ) Cough, ( - )Shortness of breath, ( - )Chest Pain    MEDICATIONS  (STANDING):  aMIOdarone    Tablet   Oral   aMIOdarone    Tablet 200 milliGRAM(s) Oral daily  aspirin enteric coated 81 milliGRAM(s) Oral daily  atorvastatin 80 milliGRAM(s) Oral at bedtime  chlorhexidine 2% Cloths 1 Application(s) Topical <User Schedule>  hydrALAZINE 50 milliGRAM(s) Oral every 8 hours  insulin glargine Injectable (LANTUS) 15 Unit(s) SubCutaneous at bedtime  insulin lispro (ADMELOG) corrective regimen sliding scale   SubCutaneous three times a day before meals  insulin lispro (ADMELOG) corrective regimen sliding scale   SubCutaneous at bedtime  insulin lispro Injectable (ADMELOG) 6 Unit(s) SubCutaneous three times a day before meals  iron sucrose Injectable 200 milliGRAM(s) IV Push every 24 hours  isosorbide   dinitrate Tablet (ISORDIL) 20 milliGRAM(s) Oral three times a day  metoprolol succinate ER 25 milliGRAM(s) Oral daily  pantoprazole    Tablet 40 milliGRAM(s) Oral before breakfast  senna 2 Tablet(s) Oral at bedtime  tamsulosin 0.4 milliGRAM(s) Oral at bedtime  torsemide 20 milliGRAM(s) Oral daily    MEDICATIONS  (PRN):  albuterol/ipratropium for Nebulization 3 milliLiter(s) Nebulizer every 6 hours PRN Respiratory Distress  polyethylene glycol 3350 17 Gram(s) Oral daily PRN Constipation          T(C): 37 (11-20 @ 05:28), Max: 37 (11-19 @ 11:40)   HR: 86   BP: 98/60   RR: 18   SpO2: 94%    PHYSICAL EXAM:    CONSTITUTIONAL: NAD, well-developed, well-groomed  EYES: PERRLA; conjunctiva and sclera clear  ENMT: Moist oral mucosa, no pharyngeal injection or exudates; normal dentition  NECK: Supple, no palpable masses; no thyromegaly  RESPIRATORY: Normal respiratory effort; lungs are clear to auscultation bilaterally  CARDIOVASCULAR: Regular rate and rhythm, normal S1 and S2, no murmur/rub/gallop; No lower extremity edema; Peripheral pulses are 2+ bilaterally  ABDOMEN: Nontender to palpation, normoactive bowel sounds, no rebound/guarding; No hepatosplenomegaly  MUSCULOSKELETAL:  no clubbing or cyanosis of digits; no joint swelling or tenderness to palpation  PSYCH: A+O to person, place, and time; affect appropriate  NEUROLOGY: CN 2-12 are intact and symmetric; no gross sensory deficits   SKIN: No rashes; no palpable lesions      LABS:                        11.0   9.17  )-----------( 248      ( 20 Nov 2023 06:50 )             37.0      11-20    134<L>  |  102  |  73<H>  ----------------------------<  169<H>  4.4   |  19<L>  |  3.76<H>    Ca    8.5      20 Nov 2023 06:50  Phos  5.4     11-19  Mg     2.3     11-20    TPro  6.1  /  Alb  3.3  /  TBili  0.3  /  DBili  x   /  AST  40  /  ALT  88<H>  /  AlkPhos  135<H>  11-19       CAPILLARY BLOOD GLUCOSE      POCT Blood Glucose.: 164 mg/dL (20 Nov 2023 06:46)  POCT Blood Glucose.: 210 mg/dL (19 Nov 2023 21:14)  POCT Blood Glucose.: 301 mg/dL (19 Nov 2023 17:14)  POCT Blood Glucose.: 230 mg/dL (19 Nov 2023 11:28)      RADIOLOGY & ADDITIONAL TESTS:    Imaging Personally Reviewed:  Consultant(s) Notes Reviewed:    Care Discussed with Consultants/Other Providers:   Andre Reyes, M.D.  Pager: [phone]  Office: [phone]    Patient is a 58y old  Male who presents with a chief complaint of NSTEMI (20 Nov 2023 08:16)          SUBJECTIVE / OVERNIGHT EVENTS:    pt with an episode of hypoxia overnight.  pt needed to be placed on Bipap  cxr with pulmonary edema    ROS: ( - ) Fever, ( - )Chills,  ( - )Nausea/Vomiting, ( - ) Cough, ( - )Shortness of breath, ( - )Chest Pain    MEDICATIONS  (STANDING):  aMIOdarone    Tablet   Oral   aMIOdarone    Tablet 200 milliGRAM(s) Oral daily  aspirin enteric coated 81 milliGRAM(s) Oral daily  atorvastatin 80 milliGRAM(s) Oral at bedtime  chlorhexidine 2% Cloths 1 Application(s) Topical <User Schedule>  hydrALAZINE 50 milliGRAM(s) Oral every 8 hours  insulin glargine Injectable (LANTUS) 15 Unit(s) SubCutaneous at bedtime  insulin lispro (ADMELOG) corrective regimen sliding scale   SubCutaneous three times a day before meals  insulin lispro (ADMELOG) corrective regimen sliding scale   SubCutaneous at bedtime  insulin lispro Injectable (ADMELOG) 6 Unit(s) SubCutaneous three times a day before meals  iron sucrose Injectable 200 milliGRAM(s) IV Push every 24 hours  isosorbide   dinitrate Tablet (ISORDIL) 20 milliGRAM(s) Oral three times a day  metoprolol succinate ER 25 milliGRAM(s) Oral daily  pantoprazole    Tablet 40 milliGRAM(s) Oral before breakfast  senna 2 Tablet(s) Oral at bedtime  tamsulosin 0.4 milliGRAM(s) Oral at bedtime  torsemide 20 milliGRAM(s) Oral daily    MEDICATIONS  (PRN):  albuterol/ipratropium for Nebulization 3 milliLiter(s) Nebulizer every 6 hours PRN Respiratory Distress  polyethylene glycol 3350 17 Gram(s) Oral daily PRN Constipation          T(C): 37 (11-20 @ 05:28), Max: 37 (11-19 @ 11:40)   HR: 86   BP: 98/60   RR: 18   SpO2: 94%    PHYSICAL EXAM:    CONSTITUTIONAL: NAD, well-developed, well-groomed  EYES: PERRLA; conjunctiva and sclera clear  ENMT: Moist oral mucosa, no pharyngeal injection or exudates; normal dentition  NECK: Supple, no palpable masses; no thyromegaly  RESPIRATORY: + rales b/l  CARDIOVASCULAR: Regular rate and rhythm, normal S1 and S2, no murmur/rub/gallop; 2+ lower extremity edema; Peripheral pulses are 2+ bilaterally  ABDOMEN: Nontender to palpation, normoactive bowel sounds, no rebound/guarding; No hepatosplenomegaly  MUSCULOSKELETAL:  no clubbing or cyanosis of digits; no joint swelling or tenderness to palpation  PSYCH: A+O to person, place, and time; affect appropriate  NEUROLOGY: CN 2-12 are intact and symmetric; no gross sensory deficits   SKIN: No rashes; no palpable lesions      LABS:                        11.0   9.17  )-----------( 248      ( 20 Nov 2023 06:50 )             37.0      11-20    134<L>  |  102  |  73<H>  ----------------------------<  169<H>  4.4   |  19<L>  |  3.76<H>    Ca    8.5      20 Nov 2023 06:50  Phos  5.4     11-19  Mg     2.3     11-20    TPro  6.1  /  Alb  3.3  /  TBili  0.3  /  DBili  x   /  AST  40  /  ALT  88<H>  /  AlkPhos  135<H>  11-19       CAPILLARY BLOOD GLUCOSE      POCT Blood Glucose.: 164 mg/dL (20 Nov 2023 06:46)  POCT Blood Glucose.: 210 mg/dL (19 Nov 2023 21:14)  POCT Blood Glucose.: 301 mg/dL (19 Nov 2023 17:14)  POCT Blood Glucose.: 230 mg/dL (19 Nov 2023 11:28)      RADIOLOGY & ADDITIONAL TESTS:    Imaging Personally Reviewed:  Consultant(s) Notes Reviewed:    Care Discussed with Consultants/Other Providers:

## 2023-11-20 NOTE — PROGRESS NOTE ADULT - ASSESSMENT
59 yo Male pmhx CVA with mild left sided deficits, HTN, DM2, vertigo, HLD, Mobitz II s/pp Medtronic dual chamber ICD (12/21/22) initially presented to [facility] from home with complaints of dyspnea and chest pain and was admitted w/ acute hypoxic respiratory failure likely due to acute pulmonary edema due to acute HFrEF in setting of acute NSTEMI, LAURA and enterovirus infection. Pt with brief MICU stay at [facility] requiring bipap (no intubation), was treated for PNA with cefepime, and was found to have TTE done 9/26/23 showing EF 20% with severely decreased LVSF, multiple regional wall motion abnormalities, and elevated LV end diastolic pressure. Pt was transferred to [facility] for cardiac evaluation. C performed which revealed severe 3v CAD with critical proximal LAD involvement. CTS was consulted for CA Found to have LV thrombus. AMS prompting CT head with angio on 11/6 revealing R medial cerebellar infarct. MRI showing evolving PICA stroke and severe stenosis of L vertebral artery and occlusion o nondominant R vertebral artery.R cerebellar/PICA infarct as well as L parietal embolic infarct, likely cardioembolic neurosx plans for cerebral angio.    [facility] 10/30  CTA 11/6    1- LAURA   2- CVA  3- cardiomyopathy  4- NSTEMI/3 vessel disease  5- CHF   6- htn       worsening LAURA in setting of decompensated CHF, as well as, lower BP  agree with bumex 2mg IV BID  his weight is not overall improving  francis in place for strict I/O  hydralazine 50 mg tid/isosorbide 20mg TID for htn and afterload reduction as per HF  flomax 0.4 mg daily  strict I/O  trend creatinine and electrolytes daily  neuro planning neuro angio, however now with laura, and will have high risk contrast nephropathy. recommend hold off on angio, unless it becomes urgent/emergent.

## 2023-11-20 NOTE — PROGRESS NOTE ADULT - NS ATTEND AMEND GEN_ALL_CORE FT
Seen, examined with, formulated plan with and  agree with above as scribed by NP Pauline [[email or web address]]     lungs + coarse bs   heart RRR  ext no edema    LAURA   CHF   cardiomyopathy     cr higher   fluid overloaded   lasix drip @ 10 mg hr   keep O>I   ? ionotropic support  for RHC  d/w CHF team

## 2023-11-20 NOTE — PROGRESS NOTE ADULT - PROBLEM SELECTOR PLAN 3
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 Neurology following and consulted neurosurgery, with plans for angio on Monday-Tuesday.   Medically, patient is high risk for low risk procedure, however unclear if further optimization can be performed due to severe chronic cardiac disease.  - given LAURA, angiogram is on hold off for now  - Will revisit issue following improvement in LAURA as described above

## 2023-11-20 NOTE — PROGRESS NOTE ADULT - PROBLEM SELECTOR PLAN 2
baseline 1.28 on Oct 15, 2023. Likely in setting of cardiorenal vs. ATN from hypotension.  - s/p cardiomems with elevated pressure  - s/p IV bumex with hypertonic saline  - renal US with No hydronephrosis. Increased cortical echogenicity, suggestive of renal parenchymal disease  - appreciate ongoing nephrology recommendations, holding off on angiogram at this time  - Cr now uptrending again. Creatinine Trend: 3.76<--, 3.59<--, 3.08<--, 3.30<--, 3.31<--, 3.28<--

## 2023-11-20 NOTE — PROGRESS NOTE ADULT - ASSESSMENT
57 yo Male with prior Stroke with mild ?left sided deficits (improved), HTN, DM2, vertigo, HLD, Mobitz II s/pp Medtronic dual chamber ICD (12/21/22) initially presented to [facility] from home with complaints of dyspnea and chest pain and was admitted w/ acute hypoxic respiratory failure likely due to acute pulmonary edema due to acute HFrEF in setting of acute NSTEMI, LAURA and enterovirus infection. Pt with brief MICU stay at [facility] requiring bipap (no intubation), was treated for PNA with cefepime, and was found to have TTE done 9/26/23 showing EF 20% with severely decreased LVSF, multiple regional wall motion abnormalities, and elevated LV end diastolic pressure. Pt was transferred to [facility] for cardiac eval.   TTE EF < 20%  RHC 10/19: RA 10, RV 47/9/13, Wedge 29, PA 41/26/33, CO/CI 4.4/2.3, PA sat 57.3  EKG: sinus tachycardia, septal q-waves, left axis deviation   TTE 10/16/23: LV 5.5 cm, LVEF 20% with regional WMAs worse in the apex, LVOT VTI 8 cm, normal RV size/function, severe functional MR, small pericardial effusion, estimated RA pressure 8 mmHg   TTE 12/2022: normal LVEF   LHC: 95% discrete proximal LAD disease and sequential multifocal disease with good distal target, 80-90% proximal LCx disease just prior to marginals, severe multifocal RCA disease with good targets   A1c 8.4    CD 10/17 neg   NIHSS 1  CTH with age indeterminate R cerebellar infarct.  likely chronic   CTA with mod L MCA stenosis, R VA occlusion, severe L VA stenosis   MRi brain with eovlving acute/subacute R PICA infarct with mild edema and mild hemorrhagic transformation. 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  occluded R VA. severe L VA stenosis   CTH stable   RRT/code stroke on 11/6 in setting of SBP 80s/50s   CTH stable. old R cerebellar infarct. CTA with severe L VA stenosis ; R VA occlusion   11/8 RRT transferred to CCU. no neuro changes   MR NOVA: Old right medial cerebellar infarct with hemosiderin staining. Small vessel white matter ischemic changes. Left occipital white matter infarct with diffusion restriction. Scattered foci of hemosiderin deposition in the basal ganglia and left parietal region.Occluded nondominant right vertebral artery. There is stenosis V4 segment dominant left vertebral artery. Significant left M1 stenosis using noninvasive flow MR angiography.  s/p cath        Impression:   R cerebellar/PICA infarct as well as L parietal embolic infarct, likely cardioembolic   worsening symptoms in setting of hypotension,m now improved       - neurosx plans for cerebral angio when Cr allows   - -  patient will be high risk from neuro standpoint for cardiac intervention however benefits at this point seem to outweigh risks.  would consider PCI.  triple therapy will be needed    - keep SBP > 100; becomes symptomatic when BP drops   - c/o SOB ; f/u cardio and pulmo   - f/u heart failure team   - can consider routine EEG but low yield   - CTS eval for CABG given 3 vessel disease vs high risk PCI --> not a CABG candidate ; possible PCI planning but would need "triple therapy"   - c/w asa and coumadin and statin therpay for secondary stroke prevention.   - no objection to heparin drip for low EF and LV thrombus  --> no objection to transition to coumadin ; hep to coumadin bridge ; coumadin per INR   - called for risk stratification for heart transplant eval,  low-mod risk from stroke standpoint and no objection   - b12, TSH, RPR for reversible causes of dementia   - telemetry  - PT/OT   - check FS, glucose control <180  - GI/DVT ppx   - Thank you for allowing me to participate in the care of this patient. Call with questions.         Abelardo Irving MD  Vascular Neurology  Office: [phone]

## 2023-11-20 NOTE — PROGRESS NOTE ADULT - SUBJECTIVE AND OBJECTIVE BOX
ADVANCED HEART FAILURE & TRANSPLANT  - PROGRESS NOTE  *To reach the NS2 Team from 8am to 5pm (MON-FRI), please call [phone].   _______________________________________________________________________________________________________    Subjective:    Medications:  albuterol/ipratropium for Nebulization 3 milliLiter(s) Nebulizer every 6 hours PRN  aMIOdarone    Tablet   Oral   aMIOdarone    Tablet 200 milliGRAM(s) Oral daily  aspirin enteric coated 81 milliGRAM(s) Oral daily  atorvastatin 80 milliGRAM(s) Oral at bedtime  buMETAnide Injectable 2 milliGRAM(s) IV Push every 12 hours  chlorhexidine 2% Cloths 1 Application(s) Topical <User Schedule>  hydrALAZINE 50 milliGRAM(s) Oral every 8 hours  insulin glargine Injectable (LANTUS) 15 Unit(s) SubCutaneous at bedtime  insulin lispro (ADMELOG) corrective regimen sliding scale   SubCutaneous three times a day before meals  insulin lispro (ADMELOG) corrective regimen sliding scale   SubCutaneous at bedtime  insulin lispro Injectable (ADMELOG) 6 Unit(s) SubCutaneous three times a day before meals  iron sucrose Injectable 200 milliGRAM(s) IV Push every 24 hours  isosorbide   dinitrate Tablet (ISORDIL) 20 milliGRAM(s) Oral three times a day  metoprolol succinate ER 25 milliGRAM(s) Oral daily  pantoprazole    Tablet 40 milliGRAM(s) Oral before breakfast  polyethylene glycol 3350 17 Gram(s) Oral daily PRN  senna 2 Tablet(s) Oral at bedtime  tamsulosin 0.4 milliGRAM(s) Oral at bedtime  warfarin 3 milliGRAM(s) Oral once      Physical Exam:    Vitals:  Vital Signs Last 24 Hours  T(C): 36.7 (23 @ 11:08), Max: 37 (23 @ 05:28)  HR: 91 (23 @ 11:08) (83 - 91)  BP: 92/60 (23 @ 11:08) (90/55 - 106/71)  RR: 18 (23 @ 11:08) (17 - 20)  SpO2: 94% (23 @ 11:08) (86% - 96%)    Weight in k.5 ( @ 07:53)    I&O's Summary    2023 07:  -  2023 07:00  --------------------------------------------------------  IN: 480 mL / OUT: 700 mL / NET: -220 mL    2023 07:01  -  2023 11:42  --------------------------------------------------------  IN: 0 mL / OUT: 400 mL / NET: -400 mL        Tele:    General: No distress. Comfortable.  HEENT: EOM intact.  Neck: Neck supple. JVP not elevated. No masses  Chest: Clear to auscultation bilaterally  CV: Normal S1 and S2. No murmurs, rub, or gallops. Radial pulses normal.  Abdomen: Soft, non-distended, non-tender  Skin: No rashes or skin breakdown  Extremities: No LE edema  Neurology: Alert and oriented times three. Sensation intact  Psych: Affect normal    Labs:                        11.0   9.17  )-----------( 248      ( 2023 06:50 )             37.0         134<L>  |  102  |  73<H>  ----------------------------<  169<H>  4.4   |  19<L>  |  3.76<H>    Ca    8.5      2023 06:50  Phos  5.4       Mg     2.3         TPro  6.1  /  Alb  3.3  /  TBili  0.3  /  DBili  x   /  AST  40  /  ALT  88<H>  /  AlkPhos  135<H>  11-    PT/INR - ( 2023 06:50 )   PT: 30.0 sec;   INR: 2.81 ratio                        ADVANCED HEART FAILURE & TRANSPLANT  - PROGRESS NOTE  *To reach the NS2 Team from 8am to 5pm (MON-FRI), please call [phone].   _______________________________________________________________________________________________________    Subjective:  - Now requiring BiPAP   - remains dyspenic    Medications:  albuterol/ipratropium for Nebulization 3 milliLiter(s) Nebulizer every 6 hours PRN  aMIOdarone    Tablet   Oral   aMIOdarone    Tablet 200 milliGRAM(s) Oral daily  aspirin enteric coated 81 milliGRAM(s) Oral daily  atorvastatin 80 milliGRAM(s) Oral at bedtime  buMETAnide Injectable 2 milliGRAM(s) IV Push every 12 hours  chlorhexidine 2% Cloths 1 Application(s) Topical <User Schedule>  hydrALAZINE 50 milliGRAM(s) Oral every 8 hours  insulin glargine Injectable (LANTUS) 15 Unit(s) SubCutaneous at bedtime  insulin lispro (ADMELOG) corrective regimen sliding scale   SubCutaneous three times a day before meals  insulin lispro (ADMELOG) corrective regimen sliding scale   SubCutaneous at bedtime  insulin lispro Injectable (ADMELOG) 6 Unit(s) SubCutaneous three times a day before meals  iron sucrose Injectable 200 milliGRAM(s) IV Push every 24 hours  isosorbide   dinitrate Tablet (ISORDIL) 20 milliGRAM(s) Oral three times a day  metoprolol succinate ER 25 milliGRAM(s) Oral daily  pantoprazole    Tablet 40 milliGRAM(s) Oral before breakfast  polyethylene glycol 3350 17 Gram(s) Oral daily PRN  senna 2 Tablet(s) Oral at bedtime  tamsulosin 0.4 milliGRAM(s) Oral at bedtime  warfarin 3 milliGRAM(s) Oral once      Physical Exam:    Vitals:  Vital Signs Last 24 Hours  T(C): 36.7 (23 @ 11:08), Max: 37 (23 @ 05:28)  HR: 91 (23 @ 11:08) (83 - 91)  BP: 92/60 (23 @ 11:08) (90/55 - 106/71)  RR: 18 (23 @ 11:08) (17 - 20)  SpO2: 94% (23 @ 11:08) (86% - 96%)    Weight in k.5 ( @ 07:53)    I&O's Summary    2023 07:  -  2023 07:00  --------------------------------------------------------  IN: 480 mL / OUT: 700 mL / NET: -220 mL    2023 07:01  -  2023 11:42  --------------------------------------------------------  IN: 0 mL / OUT: 400 mL / NET: -400 mL    Tele: SR 70-80    General: No distress. Comfortable.  HEENT: EOM intact.  Neck: JVP elevated  Chest: Clear to auscultation bilaterally  CV: Normal S1 and S2. No murmurs, rub, or gallops. Radial pulses normal, warm peripherally  Abdomen: Soft, non-distended, non-tender  Skin: No rashes or skin breakdown  Extremities: No LE edema  Neurology: Alert and oriented times three. Sensation intact  Psych: Affect normal    Labs:                        11.0   9.17  )-----------( 248      ( 2023 06:50 )             37.0         134<L>  |  102  |  73<H>  ----------------------------<  169<H>  4.4   |  19<L>  |  3.76<H>    Ca    8.5      2023 06:50  Phos  5.4       Mg     2.3         TPro  6.1  /  Alb  3.3  /  TBili  0.3  /  DBili  x   /  AST  40  /  ALT  88<H>  /  AlkPhos  135<H>      PT/INR - ( 2023 06:50 )   PT: 30.0 sec;   INR: 2.81 ratio

## 2023-11-20 NOTE — PROGRESS NOTE ADULT - PROBLEM SELECTOR PLAN 1
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 Status post LHC-> 95% discrete proximal LAD disease and sequential multifocal disease with good distal target, 80-90% proximal LCx disease, severe multifocal RCA. Limited viability on MRI. Echocardiogram with LVEF <20%, rWMA & LV thrombus  - HF team & EP card f/u plans appreciated, continue with GDMT (Metoprolol ER 25mg/d, ARB/ARNI- none due to LAURA, No MRA, eventual SGLT2i)  - decreased Hydral 50mg tid and ISDN 20mg TID  - Inotropes: Off milrinone since 11/1  - Advanced therapies- holding off on launching VAD/transplant eval given cognitive issues   - No longer a PCI candidate due to contraindications for ECMO, which likely would be required as per EP note. Will reassess following completion of amiodarone load.    - daily weight and I/O  - will need to transition torsemide 20mg daily to bumex 2mg IV bid  - further recs per CHF team

## 2023-11-20 NOTE — PROGRESS NOTE ADULT - NS ATTEND AMEND GEN_ALL_CORE FT
Acutely became SOB overnight and placed on BIPAP  Review of CXR shows worsening pulmonary edema  Give a bolus of lasix 40IV once and a continuous drip of lasix 10mg/hr  Would also get a repeat echo  If he continues to decompensate, will need to go back to the CCU for invasive hemodynamic assessment

## 2023-11-20 NOTE — PROGRESS NOTE ADULT - SUBJECTIVE AND OBJECTIVE BOX
[facility]   [phone]  RENAL FOLLOW UP NOTE  --------------------------------------------------------------------------------  Chief Complaint:    24 hour events/subjective:    patient seen and examined.   on bipap when seen     PAST HISTORY  --------------------------------------------------------------------------------  No significant changes to PMH, PSH, FHx, SHx, unless otherwise noted    ALLERGIES & MEDICATIONS  --------------------------------------------------------------------------------  Allergies    No Known Allergies    Intolerances      Standing Inpatient Medications  aMIOdarone    Tablet   Oral   aMIOdarone    Tablet 200 milliGRAM(s) Oral daily  aspirin enteric coated 81 milliGRAM(s) Oral daily  atorvastatin 80 milliGRAM(s) Oral at bedtime  buMETAnide Injectable 2 milliGRAM(s) IV Push every 12 hours  chlorhexidine 2% Cloths 1 Application(s) Topical <User Schedule>  hydrALAZINE 50 milliGRAM(s) Oral every 8 hours  insulin glargine Injectable (LANTUS) 15 Unit(s) SubCutaneous at bedtime  insulin lispro (ADMELOG) corrective regimen sliding scale   SubCutaneous three times a day before meals  insulin lispro (ADMELOG) corrective regimen sliding scale   SubCutaneous at bedtime  insulin lispro Injectable (ADMELOG) 6 Unit(s) SubCutaneous three times a day before meals  iron sucrose Injectable 200 milliGRAM(s) IV Push every 24 hours  isosorbide   dinitrate Tablet (ISORDIL) 20 milliGRAM(s) Oral three times a day  metoprolol succinate ER 25 milliGRAM(s) Oral daily  pantoprazole    Tablet 40 milliGRAM(s) Oral before breakfast  senna 2 Tablet(s) Oral at bedtime  tamsulosin 0.4 milliGRAM(s) Oral at bedtime  warfarin 3 milliGRAM(s) Oral once    PRN Inpatient Medications  albuterol/ipratropium for Nebulization 3 milliLiter(s) Nebulizer every 6 hours PRN  polyethylene glycol 3350 17 Gram(s) Oral daily PRN      REVIEW OF SYSTEMS  --------------------------------------------------------------------------------    on bipap, awake, unable to obtain ROS    VITALS/PHYSICAL EXAM  --------------------------------------------------------------------------------  T(C): 36.7 (11-20-23 @ 11:08), Max: 37 (11-20-23 @ 05:28)  HR: 91 (11-20-23 @ 11:08) (83 - 91)  BP: 92/60 (11-20-23 @ 11:08) (90/55 - 106/71)  RR: 18 (11-20-23 @ 11:08) (17 - 20)  SpO2: 94% (11-20-23 @ 11:08) (86% - 96%)  Wt(kg): --        11-19-23 @ 07:01  -  11-20-23 @ 07:00  --------------------------------------------------------  IN: 480 mL / OUT: 700 mL / NET: -220 mL    11-20-23 @ 07:01  -  11-20-23 @ 12:07  --------------------------------------------------------  IN: 0 mL / OUT: 400 mL / NET: -400 mL      Physical Exam:  	  	Gen: ill appearing male, on bipap  	Pulm: decrease bs, +coarse bs.  	CV: +JVD. RRR, S1S2; no rub  	Back: No CVA tenderness; no sacral edema  	Abd: +BS, soft, nontender/nondistended  	: +penny  	UE: Warm, no cyanosis  no clubbing,  no edema;  	LE: Warm, no cyanosis  no clubbing, B/L 1+ ankle edema    LABS/STUDIES  --------------------------------------------------------------------------------              11.0   9.17  >-----------<  248      [11-20-23 @ 06:50]              37.0     134  |  102  |  73  ----------------------------<  169      [11-20-23 @ 06:50]  4.4   |  19  |  3.76        Ca     8.5     [11-20-23 @ 06:50]      Mg     2.3     [11-20-23 @ 06:50]      Phos  5.4     [11-19-23 @ 22:23]    TPro  6.1  /  Alb  3.3  /  TBili  0.3  /  DBili  x   /  AST  40  /  ALT  88  /  AlkPhos  135  [11-19-23 @ 22:23]    PT/INR: PT 30.0 , INR 2.81       [11-20-23 @ 06:50]      Creatinine Trend:  SCr 3.76 [11-20 @ 06:50]  SCr 3.59 [11-19 @ 22:23]  SCr 3.08 [11-19 @ 06:40]  SCr 3.30 [11-18 @ 05:30]  SCr 3.31 [11-17 @ 06:20]          Iron 22, TIBC 255, %sat 8      [11-15-23 @ 06:39]  Ferritin 72      [11-15-23 @ 06:39]  TSH 0.34      [11-10-23 @ 06:32]  Lipid: chol 167, TG 80, HDL 52, LDL --      [10-17-23 @ 08:16]    Syphilis Screen (Treponema Pallidum Ab) Negative      [10-29-23 @ 00:03]

## 2023-11-20 NOTE — PROGRESS NOTE ADULT - PROBLEM SELECTOR PLAN 1
- Etiology: ischemic with LVEF <20%  - Start Lasix gtts @10mg/hr; Would give bolus of Lasix 40mg IVP x1 dose prior  - Monitor daily standing weights. Will assess CardioMEMS intermittently; PAD today remains above goal    - Continue HDZN to 50 mg TID and ISDN to 20 mg TID, hold for SBP <90  - Continue Toprol XL 25 mg QD  - Farxiga stopped by nephrology 11/14 for concern of ongoing LAURA   - Please engage PT to prevent deconditioning  - Advanced therapies: holding off on launching VAD/transplant eval given cognitive issues however will continue to reassess. Of note he has good support and no clear psychosocial red flags. ABO AB.

## 2023-11-20 NOTE — PROGRESS NOTE ADULT - ASSESSMENT
57 YO M with history of CVA with residual mild R paresthesias, second degree Mobitz II heart block s/p DC-ICD 12/2022 (initial concern for sarcoid but negative biopsy/PET post procedure), DM2 (A1c 8.4%), and HTN who was admitted to [facility] with chest pain and dyspnea, found to have NSTEMI with markedly elevated troponins and new severe LV dysfunction with regional wall motion abnormalities as well as + enterovirus. He required NIPPV and was diuresed before being transferred to [facility] where LHC performed which revealed severe 3v CAD with critical proximal LAD involvement. CTS was consulted for CABG evaluation and HF consulted for comanagement. Given concern for low flow status sent back to CCU 10/28. Found to have LV thrombus. AMS prompting CT head with angio on 11/6 revealing R medial cerebellar infarct. MRI showing evolving PICA stroke and severe stenosis of L vertebral artery and occlusion o nondominant R vertebral artery. Given evolving stroke, brain lesion and ongoing confusion, not a candidate for coronary intervention. Additionally, per CTS review, no LAD viability. on 11/8, exhibited sustained MMVT that fell into monitoring zone of ICD that spontaneously terminated. Initiated on PO Amio load. Tolerated wean off Milrinone 11/1 with escalation of vasodilators.     He underwent RHC/CardioMEMS [id number] for longitudinal management, hemodynamics revealing elevated filling pressures and borderline CI. Had improved with IV Bumex and hypertonic saline but has persistent LAURA. More recently requiring BiPAP and appears volume up on exam.     Cardiac Studies  ·	RHC w/ CardioMEMS: RA 14 (v to 16), PA 46/24/32, PCWP 25 (v to 30), %, PA 56.8%, CO/CI (F) 4.2/2.19, /65 (86)  ·	TTE limited 11/8/23: LVEF <20%, compared to prior LV thrombus much smaller  ·	TTE 10/26/23: EF <20% LVT present, small pericardial effusion w/o tamponade  ·	RHC 10/19/23: RA 10, RV 47/9/13, Wedge 29, PA 41/26/33, CO/CI 4.4/2.3, PA sat 57.3  ·	CMR 10/18/23: There is greater than 50% thickness subendocardial LGE within the mid anteroseptum, anterior and anterolateral segments and apical segments  ·	TTE 10/16/23: LV 5.5 cm, LVEF 20% with regional WMAs worse in the apex, LVOT VTI 8 cm, normal RV size/function, severe functional MR, small pericardial effusion, estimated RA pressure 8 mmHg   ·	LHC 10/16/23: 95% discrete proximal LAD disease and sequential multifocal disease with good distal target, 80-90% proximal LCx disease just prior to marginals, severe multifocal RCA disease with good targets   ·	TTE 12/2022: normal LVEF     Hemodynamics  10/30 RHC: RHC: RA 14/13/12, RV 51/14, PA 47/34/39, PCW 34/36/32, CI 2.5/CO4.95   11/1/23 CVP 13, PA 48/23/35, mVO2 62.6%, Cris CI 2.4  10/21/23: RA 13 with V-wave 21, PA 50/33, PCW 33, MvO2 68.2, Cris CO/CI 4.4/2.56

## 2023-11-21 LAB
ALBUMIN SERPL ELPH-MCNC: 3 G/DL — LOW (ref 3.3–5)
ALBUMIN SERPL ELPH-MCNC: 3 G/DL — LOW (ref 3.3–5)
ALP SERPL-CCNC: 130 U/L — HIGH (ref 40–120)
ALP SERPL-CCNC: 130 U/L — HIGH (ref 40–120)
ALT FLD-CCNC: 164 U/L — HIGH (ref 10–45)
ALT FLD-CCNC: 164 U/L — HIGH (ref 10–45)
ANION GAP SERPL CALC-SCNC: 16 MMOL/L — SIGNIFICANT CHANGE UP (ref 5–17)
ANION GAP SERPL CALC-SCNC: 16 MMOL/L — SIGNIFICANT CHANGE UP (ref 5–17)
ANION GAP SERPL CALC-SCNC: 17 MMOL/L — SIGNIFICANT CHANGE UP (ref 5–17)
ANION GAP SERPL CALC-SCNC: 17 MMOL/L — SIGNIFICANT CHANGE UP (ref 5–17)
APTT BLD: 37.1 SEC — HIGH (ref 24.5–35.6)
APTT BLD: 37.1 SEC — HIGH (ref 24.5–35.6)
AST SERPL-CCNC: 65 U/L — HIGH (ref 10–40)
AST SERPL-CCNC: 65 U/L — HIGH (ref 10–40)
BASOPHILS # BLD AUTO: 0.02 K/UL — SIGNIFICANT CHANGE UP (ref 0–0.2)
BASOPHILS # BLD AUTO: 0.02 K/UL — SIGNIFICANT CHANGE UP (ref 0–0.2)
BASOPHILS NFR BLD AUTO: 0.1 % — SIGNIFICANT CHANGE UP (ref 0–2)
BASOPHILS NFR BLD AUTO: 0.1 % — SIGNIFICANT CHANGE UP (ref 0–2)
BILIRUB SERPL-MCNC: 0.5 MG/DL — SIGNIFICANT CHANGE UP (ref 0.2–1.2)
BILIRUB SERPL-MCNC: 0.5 MG/DL — SIGNIFICANT CHANGE UP (ref 0.2–1.2)
BUN SERPL-MCNC: 84 MG/DL — HIGH (ref 7–23)
BUN SERPL-MCNC: 84 MG/DL — HIGH (ref 7–23)
BUN SERPL-MCNC: 90 MG/DL — HIGH (ref 7–23)
BUN SERPL-MCNC: 90 MG/DL — HIGH (ref 7–23)
CA-I BLD-SCNC: 1.11 MMOL/L — LOW (ref 1.15–1.33)
CA-I BLD-SCNC: 1.11 MMOL/L — LOW (ref 1.15–1.33)
CALCIUM SERPL-MCNC: 8.3 MG/DL — LOW (ref 8.4–10.5)
CHLORIDE SERPL-SCNC: 99 MMOL/L — SIGNIFICANT CHANGE UP (ref 96–108)
CO2 SERPL-SCNC: 17 MMOL/L — LOW (ref 22–31)
CO2 SERPL-SCNC: 17 MMOL/L — LOW (ref 22–31)
CO2 SERPL-SCNC: 19 MMOL/L — LOW (ref 22–31)
CO2 SERPL-SCNC: 19 MMOL/L — LOW (ref 22–31)
CREAT SERPL-MCNC: 4.15 MG/DL — HIGH (ref 0.5–1.3)
CREAT SERPL-MCNC: 4.15 MG/DL — HIGH (ref 0.5–1.3)
CREAT SERPL-MCNC: 4.21 MG/DL — HIGH (ref 0.5–1.3)
CREAT SERPL-MCNC: 4.21 MG/DL — HIGH (ref 0.5–1.3)
EGFR: 16 ML/MIN/1.73M2 — LOW
EOSINOPHIL # BLD AUTO: 0 K/UL — SIGNIFICANT CHANGE UP (ref 0–0.5)
EOSINOPHIL # BLD AUTO: 0 K/UL — SIGNIFICANT CHANGE UP (ref 0–0.5)
EOSINOPHIL NFR BLD AUTO: 0 % — SIGNIFICANT CHANGE UP (ref 0–6)
EOSINOPHIL NFR BLD AUTO: 0 % — SIGNIFICANT CHANGE UP (ref 0–6)
GAS PNL BLDA: SIGNIFICANT CHANGE UP
GLUCOSE BLDC GLUCOMTR-MCNC: 144 MG/DL — HIGH (ref 70–99)
GLUCOSE BLDC GLUCOMTR-MCNC: 144 MG/DL — HIGH (ref 70–99)
GLUCOSE BLDC GLUCOMTR-MCNC: 175 MG/DL — HIGH (ref 70–99)
GLUCOSE BLDC GLUCOMTR-MCNC: 175 MG/DL — HIGH (ref 70–99)
GLUCOSE SERPL-MCNC: 150 MG/DL — HIGH (ref 70–99)
GLUCOSE SERPL-MCNC: 150 MG/DL — HIGH (ref 70–99)
GLUCOSE SERPL-MCNC: 191 MG/DL — HIGH (ref 70–99)
GLUCOSE SERPL-MCNC: 191 MG/DL — HIGH (ref 70–99)
HCT VFR BLD CALC: 35.3 % — LOW (ref 39–50)
HCT VFR BLD CALC: 35.3 % — LOW (ref 39–50)
HCT VFR BLD CALC: 37 % — LOW (ref 39–50)
HCT VFR BLD CALC: 37 % — LOW (ref 39–50)
HGB BLD-MCNC: 10.9 G/DL — LOW (ref 13–17)
HGB BLD-MCNC: 10.9 G/DL — LOW (ref 13–17)
HGB BLD-MCNC: 11.1 G/DL — LOW (ref 13–17)
HGB BLD-MCNC: 11.1 G/DL — LOW (ref 13–17)
IMM GRANULOCYTES NFR BLD AUTO: 0.7 % — SIGNIFICANT CHANGE UP (ref 0–0.9)
IMM GRANULOCYTES NFR BLD AUTO: 0.7 % — SIGNIFICANT CHANGE UP (ref 0–0.9)
INR BLD: 3.2 RATIO — HIGH (ref 0.85–1.18)
INR BLD: 3.2 RATIO — HIGH (ref 0.85–1.18)
INR BLD: 3.23 RATIO — HIGH (ref 0.85–1.18)
INR BLD: 3.23 RATIO — HIGH (ref 0.85–1.18)
LYMPHOCYTES # BLD AUTO: 0.53 K/UL — LOW (ref 1–3.3)
LYMPHOCYTES # BLD AUTO: 0.53 K/UL — LOW (ref 1–3.3)
LYMPHOCYTES # BLD AUTO: 3.9 % — LOW (ref 13–44)
LYMPHOCYTES # BLD AUTO: 3.9 % — LOW (ref 13–44)
MAGNESIUM SERPL-MCNC: 2.4 MG/DL — SIGNIFICANT CHANGE UP (ref 1.6–2.6)
MAGNESIUM SERPL-MCNC: 2.4 MG/DL — SIGNIFICANT CHANGE UP (ref 1.6–2.6)
MAGNESIUM SERPL-MCNC: 2.5 MG/DL — SIGNIFICANT CHANGE UP (ref 1.6–2.6)
MAGNESIUM SERPL-MCNC: 2.5 MG/DL — SIGNIFICANT CHANGE UP (ref 1.6–2.6)
MCHC RBC-ENTMCNC: 24.3 PG — LOW (ref 27–34)
MCHC RBC-ENTMCNC: 24.3 PG — LOW (ref 27–34)
MCHC RBC-ENTMCNC: 24.8 PG — LOW (ref 27–34)
MCHC RBC-ENTMCNC: 24.8 PG — LOW (ref 27–34)
MCHC RBC-ENTMCNC: 30 GM/DL — LOW (ref 32–36)
MCHC RBC-ENTMCNC: 30 GM/DL — LOW (ref 32–36)
MCHC RBC-ENTMCNC: 30.9 GM/DL — LOW (ref 32–36)
MCHC RBC-ENTMCNC: 30.9 GM/DL — LOW (ref 32–36)
MCV RBC AUTO: 80.2 FL — SIGNIFICANT CHANGE UP (ref 80–100)
MCV RBC AUTO: 80.2 FL — SIGNIFICANT CHANGE UP (ref 80–100)
MCV RBC AUTO: 81.1 FL — SIGNIFICANT CHANGE UP (ref 80–100)
MCV RBC AUTO: 81.1 FL — SIGNIFICANT CHANGE UP (ref 80–100)
MONOCYTES # BLD AUTO: 0.73 K/UL — SIGNIFICANT CHANGE UP (ref 0–0.9)
MONOCYTES # BLD AUTO: 0.73 K/UL — SIGNIFICANT CHANGE UP (ref 0–0.9)
MONOCYTES NFR BLD AUTO: 5.4 % — SIGNIFICANT CHANGE UP (ref 2–14)
MONOCYTES NFR BLD AUTO: 5.4 % — SIGNIFICANT CHANGE UP (ref 2–14)
NEUTROPHILS # BLD AUTO: 12.11 K/UL — HIGH (ref 1.8–7.4)
NEUTROPHILS # BLD AUTO: 12.11 K/UL — HIGH (ref 1.8–7.4)
NEUTROPHILS NFR BLD AUTO: 89.9 % — HIGH (ref 43–77)
NEUTROPHILS NFR BLD AUTO: 89.9 % — HIGH (ref 43–77)
NRBC # BLD: 0 /100 WBCS — SIGNIFICANT CHANGE UP (ref 0–0)
PHOSPHATE SERPL-MCNC: 6.2 MG/DL — HIGH (ref 2.5–4.5)
PHOSPHATE SERPL-MCNC: 6.2 MG/DL — HIGH (ref 2.5–4.5)
PLATELET # BLD AUTO: 262 K/UL — SIGNIFICANT CHANGE UP (ref 150–400)
PLATELET # BLD AUTO: 262 K/UL — SIGNIFICANT CHANGE UP (ref 150–400)
PLATELET # BLD AUTO: 280 K/UL — SIGNIFICANT CHANGE UP (ref 150–400)
PLATELET # BLD AUTO: 280 K/UL — SIGNIFICANT CHANGE UP (ref 150–400)
POTASSIUM SERPL-MCNC: 4.8 MMOL/L — SIGNIFICANT CHANGE UP (ref 3.5–5.3)
POTASSIUM SERPL-MCNC: 4.8 MMOL/L — SIGNIFICANT CHANGE UP (ref 3.5–5.3)
POTASSIUM SERPL-MCNC: 5 MMOL/L — SIGNIFICANT CHANGE UP (ref 3.5–5.3)
POTASSIUM SERPL-MCNC: 5 MMOL/L — SIGNIFICANT CHANGE UP (ref 3.5–5.3)
POTASSIUM SERPL-SCNC: 4.8 MMOL/L — SIGNIFICANT CHANGE UP (ref 3.5–5.3)
POTASSIUM SERPL-SCNC: 4.8 MMOL/L — SIGNIFICANT CHANGE UP (ref 3.5–5.3)
POTASSIUM SERPL-SCNC: 5 MMOL/L — SIGNIFICANT CHANGE UP (ref 3.5–5.3)
POTASSIUM SERPL-SCNC: 5 MMOL/L — SIGNIFICANT CHANGE UP (ref 3.5–5.3)
PROT SERPL-MCNC: 5.9 G/DL — LOW (ref 6–8.3)
PROT SERPL-MCNC: 5.9 G/DL — LOW (ref 6–8.3)
PROTHROM AB SERPL-ACNC: 32.8 SEC — HIGH (ref 9.5–13)
PROTHROM AB SERPL-ACNC: 32.8 SEC — HIGH (ref 9.5–13)
PROTHROM AB SERPL-ACNC: 34 SEC — HIGH (ref 9.5–13)
PROTHROM AB SERPL-ACNC: 34 SEC — HIGH (ref 9.5–13)
RBC # BLD: 4.4 M/UL — SIGNIFICANT CHANGE UP (ref 4.2–5.8)
RBC # BLD: 4.4 M/UL — SIGNIFICANT CHANGE UP (ref 4.2–5.8)
RBC # BLD: 4.56 M/UL — SIGNIFICANT CHANGE UP (ref 4.2–5.8)
RBC # BLD: 4.56 M/UL — SIGNIFICANT CHANGE UP (ref 4.2–5.8)
RBC # FLD: 17.2 % — HIGH (ref 10.3–14.5)
RBC # FLD: 17.2 % — HIGH (ref 10.3–14.5)
RBC # FLD: 17.4 % — HIGH (ref 10.3–14.5)
RBC # FLD: 17.4 % — HIGH (ref 10.3–14.5)
SODIUM SERPL-SCNC: 133 MMOL/L — LOW (ref 135–145)
SODIUM SERPL-SCNC: 133 MMOL/L — LOW (ref 135–145)
SODIUM SERPL-SCNC: 134 MMOL/L — LOW (ref 135–145)
SODIUM SERPL-SCNC: 134 MMOL/L — LOW (ref 135–145)
WBC # BLD: 12.29 K/UL — HIGH (ref 3.8–10.5)
WBC # BLD: 12.29 K/UL — HIGH (ref 3.8–10.5)
WBC # BLD: 13.48 K/UL — HIGH (ref 3.8–10.5)
WBC # BLD: 13.48 K/UL — HIGH (ref 3.8–10.5)
WBC # FLD AUTO: 12.29 K/UL — HIGH (ref 3.8–10.5)
WBC # FLD AUTO: 12.29 K/UL — HIGH (ref 3.8–10.5)
WBC # FLD AUTO: 13.48 K/UL — HIGH (ref 3.8–10.5)
WBC # FLD AUTO: 13.48 K/UL — HIGH (ref 3.8–10.5)

## 2023-11-21 PROCEDURE — 99233 SBSQ HOSP IP/OBS HIGH 50: CPT

## 2023-11-21 PROCEDURE — 36620 INSERTION CATHETER ARTERY: CPT

## 2023-11-21 PROCEDURE — 99232 SBSQ HOSP IP/OBS MODERATE 35: CPT

## 2023-11-21 PROCEDURE — 76937 US GUIDE VASCULAR ACCESS: CPT | Mod: 26

## 2023-11-21 PROCEDURE — 99292 CRITICAL CARE ADDL 30 MIN: CPT | Mod: 25

## 2023-11-21 PROCEDURE — 99291 CRITICAL CARE FIRST HOUR: CPT | Mod: 25

## 2023-11-21 PROCEDURE — 71045 X-RAY EXAM CHEST 1 VIEW: CPT | Mod: 26

## 2023-11-21 RX ORDER — SEVELAMER CARBONATE 2400 MG/1
800 POWDER, FOR SUSPENSION ORAL THREE TIMES A DAY
Refills: 0 | Status: DISCONTINUED | OUTPATIENT
Start: 2023-11-21 | End: 2023-11-21

## 2023-11-21 RX ORDER — BUMETANIDE 0.25 MG/ML
4 INJECTION INTRAMUSCULAR; INTRAVENOUS
Qty: 20 | Refills: 0 | Status: DISCONTINUED | OUTPATIENT
Start: 2023-11-21 | End: 2023-11-21

## 2023-11-21 RX ORDER — HYDROMORPHONE HYDROCHLORIDE 2 MG/ML
1 INJECTION INTRAMUSCULAR; INTRAVENOUS; SUBCUTANEOUS
Refills: 0 | Status: DISCONTINUED | OUTPATIENT
Start: 2023-11-21 | End: 2023-11-21

## 2023-11-21 RX ORDER — MILRINONE LACTATE 1 MG/ML
0.12 INJECTION, SOLUTION INTRAVENOUS
Qty: 20 | Refills: 0 | Status: DISCONTINUED | OUTPATIENT
Start: 2023-11-21 | End: 2023-11-21

## 2023-11-21 RX ORDER — HYDROMORPHONE HYDROCHLORIDE 2 MG/ML
0.5 INJECTION INTRAMUSCULAR; INTRAVENOUS; SUBCUTANEOUS
Refills: 0 | Status: DISCONTINUED | OUTPATIENT
Start: 2023-11-21 | End: 2023-11-21

## 2023-11-21 RX ORDER — VASOPRESSIN 20 [USP'U]/ML
0.04 INJECTION INTRAVENOUS
Qty: 40 | Refills: 0 | Status: DISCONTINUED | OUTPATIENT
Start: 2023-11-21 | End: 2023-11-21

## 2023-11-21 RX ORDER — BUMETANIDE 0.25 MG/ML
4 INJECTION INTRAMUSCULAR; INTRAVENOUS ONCE
Refills: 0 | Status: COMPLETED | OUTPATIENT
Start: 2023-11-21 | End: 2023-11-21

## 2023-11-21 RX ORDER — CHLOROTHIAZIDE 500 MG
500 TABLET ORAL ONCE
Refills: 0 | Status: COMPLETED | OUTPATIENT
Start: 2023-11-21 | End: 2023-11-21

## 2023-11-21 RX ADMIN — BUMETANIDE 20 MG/HR: 0.25 INJECTION INTRAMUSCULAR; INTRAVENOUS at 17:15

## 2023-11-21 RX ADMIN — Medication 100 MILLIGRAM(S): at 16:37

## 2023-11-21 RX ADMIN — BUMETANIDE 132 MILLIGRAM(S): 0.25 INJECTION INTRAMUSCULAR; INTRAVENOUS at 16:45

## 2023-11-21 RX ADMIN — AMIODARONE HYDROCHLORIDE 200 MILLIGRAM(S): 400 TABLET ORAL at 05:11

## 2023-11-21 RX ADMIN — IRON SUCROSE 200 MILLIGRAM(S): 20 INJECTION, SOLUTION INTRAVENOUS at 16:00

## 2023-11-21 RX ADMIN — MILRINONE LACTATE 2.63 MICROGRAM(S)/KG/MIN: 1 INJECTION, SOLUTION INTRAVENOUS at 13:45

## 2023-11-21 RX ADMIN — Medication 1: at 16:03

## 2023-11-21 RX ADMIN — VASOPRESSIN 6 UNIT(S)/MIN: 20 INJECTION INTRAVENOUS at 18:23

## 2023-11-21 RX ADMIN — CHLORHEXIDINE GLUCONATE 1 APPLICATION(S): 213 SOLUTION TOPICAL at 05:23

## 2023-11-21 RX ADMIN — HYDROMORPHONE HYDROCHLORIDE 0.5 MILLIGRAM(S): 2 INJECTION INTRAMUSCULAR; INTRAVENOUS; SUBCUTANEOUS at 19:55

## 2023-11-21 RX ADMIN — PANTOPRAZOLE SODIUM 40 MILLIGRAM(S): 20 TABLET, DELAYED RELEASE ORAL at 05:11

## 2023-11-21 RX ADMIN — Medication 25 MILLIGRAM(S): at 05:11

## 2023-11-21 NOTE — PROGRESS NOTE ADULT - SUBJECTIVE AND OBJECTIVE BOX
Neurology        S: patient seen and examined.  no neuro changes.           Medications: MEDICATIONS  (STANDING):  aMIOdarone    Tablet   Oral   aMIOdarone    Tablet 200 milliGRAM(s) Oral daily  aspirin enteric coated 81 milliGRAM(s) Oral daily  atorvastatin 80 milliGRAM(s) Oral at bedtime  chlorhexidine 2% Cloths 1 Application(s) Topical <User Schedule>  furosemide Infusion 10 mG/Hr (5 mL/Hr) IV Continuous <Continuous>  hydrALAZINE 50 milliGRAM(s) Oral every 8 hours  insulin glargine Injectable (LANTUS) 15 Unit(s) SubCutaneous at bedtime  insulin lispro (ADMELOG) corrective regimen sliding scale   SubCutaneous at bedtime  insulin lispro (ADMELOG) corrective regimen sliding scale   SubCutaneous three times a day before meals  insulin lispro Injectable (ADMELOG) 6 Unit(s) SubCutaneous three times a day before meals  iron sucrose Injectable 200 milliGRAM(s) IV Push every 24 hours  isosorbide   dinitrate Tablet (ISORDIL) 20 milliGRAM(s) Oral three times a day  metoprolol succinate ER 25 milliGRAM(s) Oral daily  milrinone Infusion 0.125 MICROgram(s)/kG/Min (2.63 mL/Hr) IV Continuous <Continuous>  pantoprazole    Tablet 40 milliGRAM(s) Oral before breakfast  senna 2 Tablet(s) Oral at bedtime  tamsulosin 0.4 milliGRAM(s) Oral at bedtime    MEDICATIONS  (PRN):  albuterol/ipratropium for Nebulization 3 milliLiter(s) Nebulizer every 6 hours PRN Respiratory Distress  polyethylene glycol 3350 17 Gram(s) Oral daily PRN Constipation       Vitals:  Vital Signs Last 24 Hrs  T(C): 36.6 (21 Nov 2023 04:47), Max: 37.1 (20 Nov 2023 16:48)  T(F): 97.8 (21 Nov 2023 04:47), Max: 98.8 (20 Nov 2023 16:48)  HR: 84 (21 Nov 2023 08:48) (78 - 94)  BP: 90/64 (21 Nov 2023 05:00) (90/58 - 93/62)  BP(mean): --  RR: 19 (21 Nov 2023 04:47) (18 - 26)  SpO2: 90% (21 Nov 2023 08:48) (78% - 95%)    Parameters below as of 21 Nov 2023 04:47  Patient On (Oxygen Delivery Method): nasal cannula  O2 Flow (L/min): 50  O2 Concentration (%): 100        General Exam:   General Appearance: Appropriately dressed and in no acute distress       Head: Normocephalic, atraumatic and no dysmorphic features  Ear, Nose, and Throat: Moist mucous membranes  CVS: S1S2+  Resp: No SOB, no wheeze or rhonchi  GI: soft NT/ND  Extremities: No edema or cyanosis  Skin: No bruises or rashes     Neurological Exam:  Mental Status: Awake, alert and oriented x 2.  Able to follow simple verbal commands. Able to name and repeat. fluent speech. No obvious aphasia or dysarthria noted. poor insight. not understanding why he is in hospital   Cranial Nerves: PERRL, EOMI, VFFC, sensation V1-V3 intact,  no obvious facial asymmetry, equal elevation of palate, scm/trap 5/5, tongue is midline on protrusion. no obvious papilledema on fundoscopic exam. hearing is grossly intact.   Motor: Normal bulk, tone and strength throughout. Fine finger movements were intact and symmetric. no tremors or drift noted.    Sensation: Intact to light touch and pinprick throughout. no right/left confusion. no extinction to tactile on DSS. Romberg was negative.   Reflexes: 1+ throughout at biceps, brachioradialis, triceps, patellars and ankles bilaterally and equal. No clonus. R toe and L toe were both downgoing.  Coordination: No dysmetria on FNF or HKS  Gait:   no limitations in gait.     Data/Labs/Imaging which I personally reviewed.          LABS:                          11.1 12.29 )-----------( 280      ( 21 Nov 2023 06:29 )             37.0     11-21    134<L>  |  99  |  84<H>  ----------------------------<  150<H>  4.8   |  19<L>  |  4.15<H>    Ca    8.3<L>      21 Nov 2023 06:29  Phos  5.4     11-19  Mg     2.5     11-21    TPro  6.1  /  Alb  3.3  /  TBili  0.3  /  DBili  x   /  AST  40  /  ALT  88<H>  /  AlkPhos  135<H>  11-19    LIVER FUNCTIONS - ( 19 Nov 2023 22:23 )  Alb: 3.3 g/dL / Pro: 6.1 g/dL / ALK PHOS: 135 U/L / ALT: 88 U/L / AST: 40 U/L / GGT: x           PT/INR - ( 21 Nov 2023 06:29 )   PT: 32.8 sec;   INR: 3.23 ratio           Urinalysis Basic - ( 21 Nov 2023 06:29 )    Color: x / Appearance: x / SG: x / pH: x  Gluc: 150 mg/dL / Ketone: x  / Bili: x / Urobili: x   Blood: x / Protein: x / Nitrite: x   Leuk Esterase: x / RBC: x / WBC x   Sq Epi: x / Non Sq Epi: x / Bacteria: x                 < from: CT Head No Cont (10.27.23 @ 18:04) >    ACC: [id number] EXAM:  CT BRAIN   ORDERED BY: BRETT HOPSON     PROCEDURE DATE:  10/27/2023          INTERPRETATION:  Clinical Indication: CVA    5mm axial sections of the brain were obtained from base to vertex,   without the intravenous administration of contrast material. Coronal and   sagittal computer generated reconstructed views are available.    No prior brain imaging is available for comparison.          The ventricles and sulci are prominent consistent with mild atrophy.   Small vesselwhite matter ischemic changes are noted. There is a infarct   in the right medial cerebellum of undetermined age which could be acute   to subacute based on its degree of lucency. There is no significant   hemorrhage. Bone window examination is unremarkable. There is been   previous right-sided lens replacement surgery.      IMPRESSION: Atrophy and small vessel white matter ischemic changes. Right   medial cerebellar infarct may be acute versus subacute. No hemorrhage.      --- End of Report ---    < from: MR Angio Head No Cont (10.30.23 @ 20:58) >    ACC: [id number] EXAM:  MR ANGIO BRAIN   ORDERED BY: NATALIE CARRANZA     ACC: [id number] EXAM:  MR ANGIO NECK   ORDERED BY: NATALIE CARRANZA     ACC: [id number] EXAM:  MR BRAIN   ORDERED BY: NAVNEET CORONADO     PROCEDURE DATE:  10/30/2023          INTERPRETATION:  .    CLINICAL INFORMATION: Stroke.    TECHNIQUE: Multiplanar multi sequential MRI examination of the brain was   performed without the administration of IV gadolinium. MRA images through   the neck and [place] of [place] were obtained usinga combination of 2-D   and 3-D time-of-flight acquisition. The data was then reformatted into a   volumetric data set and reviewed as rotational MIP images.    COMPARISON: Most recent prior CT examination of the head from 10/27/2023.   No prior MRI studies are available for comparison.    FINDINGS:    MRI Brain: A wedge-shaped area of diffusion restriction is seen within   the right PICA territory. Associated T2/FLAIR hyperintense signal is also   seen, compatible with cytotoxic edema. Mild hemorrhagic transformation is   seen within the infarct bed manifested by high signal on T1-weighted   imaging as well as susceptibility artifact on the SWI sequence. Mild mass   effect is seen without effacement of the fourth ventricle or shift of the   posterior fossa midline structures.    This is superimposed upon encephalomalacia and gliosis involving the   right cerebellar hemisphere.    An additional vague area of diffusion reduction is seen within the left   parietal periventricular white matter without associated T2/FLAIR   hyperintense signal, compatible with cytotoxic edema. No gross   hemorrhagic transformation is noted in this location.    Scattered foci of susceptibility artifact are seen within the bilateral   basal ganglia, compatible with areas of chronic microhemorrhage.    Multiple additional patchy confluent nonspecific T2/FLAIR hyperintense   signal changes are noted throughout the bihemispheric white matter   without associated mass effect or restricted diffusion.    Ventricular size and configuration is unremarkable. No abnormal   extra-axial fluid collections are seen. Flow-voids are noted throughout   the major intracranial vessels, on the T2 weighted images, consistent   with their patency. The sellar location appears unremarkable. There is an   unchanged appearing small retrocerebellar arachnoid cyst versus cisterna   magna.    A polyp versus retention cyst is seen within the right maxillary sinus.   Additional minor scattered mucosal thickening seen throughout the ethmoid   labyrinth. The tympanomastoid cavities are clear. The left orbit appears   within normal limits. There is evidence of right-sided cataract removal.    There is an indeterminate mixed signal ovoid shaped soft tissue focus   involving the left superior pinna measuring 1.4 x 0.9 cm which appears   unchanged.    MRA Neck: Examination is motion limited.    There is a standard anatomic variation to the aortic arch. The origins of   the great vessels appear grossly unremarkable.    The bilateral common carotid arteries, carotid bifurcations and cervical   internal carotid arteries appear patent. The origin of the left vertebral   artery is normal. The left vertebral artery is patent.    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    MRA [place] of [place]: Atherosclerosis affects the bilateral carotid   siphons with mild area of focal stenosis involving the right   clinoid/supraclinoid junction. There is mild hypoplasia of the right A1   segment. Otherwise, the bilateral intracranial internal carotid,   anterior, and middle cerebral arteries appear unremarkable.    The anterior and bilateral posterior communicating arteries are seen.    The right V4 segment does not demonstrate flow relatedsignal. The left   V4 segment appears unremarkable. The vertebrobasilar confluence appears   unremarkable. Long segment mild stenosis involving the mid basilar   artery. The basilar tip appears unremarkable as well as the bilateral   posterior cerebral arteries.    IMPRESSION:    MRI BRAIN:  1. Evolving acute/subacute right-sided PICA distribution infarction with   associated cytotoxic edema and very mild hemorrhagic transformation.  2. Additional wedge-shaped area of acute/subacute ischemia within the   left parietal periatrial white matter with associated cytotoxic edema.  3. Indeterminate soft tissue lesion involving the left superior pinna   measuring 1.4 x 0.9 cm. Neoplasm cannot be excluded. Recommend   correlation with direct physical examination and visualization.    MRA NECK:  1. Extremely motion limited study.  2. 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 Recommend further evaluation with a CT angiogram study   of the neck.    MRA HEAD:  1. 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 This can be further evaluated with a CT angiogram study of the   head.  2. Mild focal stenosis of the right supraclinoid intracranial internal   carotid artery secondary to atherosclerosis.  3. Otherwise, no large vessel occlusion or major stenosis.    --- End of Report ---      < from: CT Brain Stroke Protocol (11.06.23 @ 14:11) >    ACC: [id number] EXAM:  CT BRAIN STROKE PROTOCOL   ORDERED BY:  KAMILA TAVAREZ     ACC: [id number] EXAM:  CT ANGIO NECK STROKE PROTCL IC   ORDERED BY:  KAMILA TAVAREZ     ACC: [id number] EXAM:  CT ANGIO BRAIN STROKE PROTC IC   ORDERED BY:  KAMILA TAVAREZ     PROCEDURE DATE:  11/06/2023          INTERPRETATION:  Clinical Indication: Code stroke for dizziness, prior   infarct one week ago    5mm axial sections of the brain were obtained from base to vertex,   without the intravenous administration of contrast material. Coronal and   sagittal computer generated reconstructed views are available.    Comparison is made with the prior CT of 10/27/2023 and the MRI 10/30/2023.    There is moderate atrophy for the patient's age with ventricular and   sulcal prominence. Small vessel white matter ischemic changes are noted.   There is an old right medial occipital infarct. No acute hemorrhage is   identified.        After the intravenous power injection of 70 cc of Omnipaque 300 using a   bolus amol timing run serial thin sections were obtained through the   neck from the thoracic inlet through the intracranial circulation   centered at the [id number] on a multislice CT scanner reformatted   with coronal and sagittal 2 D-MIP projections, including 3 D  reconstructions using a separate 3D [employer] software workstation.A total   of  70 cc of Omnipaque were intravenously injected.  30 cc were discarded.    The origins of the common carotid arteries are normal. The origin of the   left dominant left vertebral artery demonstrates moderate stenosis   nondominant small right vertebral artery appears hypoplastic on a   congenital basis and is occluded at the V3 and V4 portion.    There is severe stenosis of the V4 segment of the left vertebral artery   just proximal to the VV junction. The basilar artery is normal. The   posterior cerebral and superior cerebellar arteries are normal.    Evaluation of the carotid arteries demonstrate normal appearance to   distal cervical, petrous, cavernous and supraclinoid internal carotid   arteries. The anterior cerebral arteries and anterior communicating   artery are visualized, the right A1 segment is hypoplastic on a   congenital basis. The middle cerebral arteries are visualized, the left   M1 segment is moderately narrowed but patent. The middle cerebral artery   vessels in the sylvian fissure unremarkable.      The normal intracranial venous circulation is identified. The right   transverse sinus is dominant. The superior sagittal sinus, internal   cerebral veins, vein of Jeremías, straight sinus, transverse sinuses,   sigmoid sinuses and internal jugular veins are normal.  Cortical veins are normal.      There are large bilateral pleural effusions and consolidation in the left   upper lobe whichis similar to a prior exam of 11/5/2023. There is a   left-sided permanent pacemaker.    IMPRESSION: Atrophy for the patient's age. Old right cerebellar infarct.   No hemorrhage. No change since 10/30/2023.    Severe stenosis of the dominant left vertebral artery at the V4 segment   and moderate stenosis at the origin. 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 Moderate left M1 stenosis.    --- End of Report ---   < from: MR Angio Neck No Cont (11.10.23 @ 15:32) >    ACC: [id number] EXAM:  MR ANGIO NECK   ORDERED BY: HERSON ANNE     ACC: [id number] EXAM:  MR BRAIN   ORDERED BY: NATALIE CARRANZA     ACC: [id number] EXAM:  MR ANGIO BRAIN   ORDERED BY: HERSON EM ANNE     PROCEDURE DATE:  11/10/2023          INTERPRETATION:  CLINICAL INDICATION: CT demonstrating severe stenosis of   the dominant left vertebral artery at the V4 segment and moderate   stenosis at the origin. Occlusion of the nondominant right vertebral   artery at the V3 -4 segment. Moderate left [id number].        Magnetic resonance imaging of the brain was carried out with transaxial   SPGR, FLAIR, fast spin echo T2 weighted images, axial susceptibility   weighted series, diffusion weighted series and sagittal T1 weighted   series on a 1.5 Crystal magnet.    Comparison is made with the prior CT/CTA 11/6/2023.      There is moderate atrophy for the patient's age. An old right medial   cerebellar infarct with hemosiderin staining is identified. Small vessel   white matter ischemic changes arenoted. There is a patchy area of   diffusion restriction in the left subdural white matter. Scattered foci   of hemosiderin deposition are identified in the basal ganglia bilaterally   and left parietal region as well as the old right cerebellar infarct.    A 2 D and 3-D axial noncontrast MRA were performed on the cervical and   intracranial vessels, respectively. Intravascular flow quantification was   performed using gated 2D phase contrast MR, imaged perpendicular to the   vessel axis.  Imageswere post processed NOVA software and a NOVA flow   study report is available      There is occlusion of the distal right nondominant vertebral artery.   There is severe stenosis of the V4 segment of the dominant left vertebral   artery. There is moderate left M1 stenosis.    Flow is as follows in ml per minute.    KORU509, OMER 152, RMCA 180, RACA 19, RACA2 107    LCCA 276, LICA 193, LMCA 58, LACA 125, LACA2 68    RVA 8, LVA 93, BA 99, RPCA 32, LPCA 61    IMPRESSION: Old right medial cerebellar infarct with hemosiderin   staining. Small vessel white matter ischemic changes. Left occipital   white matter infarct with diffusion restriction. Scattered foci of   hemosiderin deposition in the basal ganglia and left parietal region.    Occluded nondominant right vertebral artery. There is stenosis V4 segment   dominant left vertebral artery. Significant left M1 stenosis using   noninvasive flow MR angiography.    --- End of Report ---            ANDREW TONEY MD; Attending Radiologist  This document has been electronically signed. Nov 10 2023  4:04PM    < end of copied text >

## 2023-11-21 NOTE — PROVIDER CONTACT NOTE (OTHER) - REASON
Pt BP is 86/52 auscultated.
Pt Refused CT Chest
Pt out of bed to chair c/o dizziness.
ptt 63.0
7 bts wct on tele
HR
Patient NPO for CT angio
Patient is sustaining 78% on 50/50 hi derian nasal cannula and complaining of shortness of breath
Hypoglycemia/ VT
pt lethargic not sustaining awakenings
Patient is scheduled for AM hydralazine and isosorbide. Both medication have a parameter to hold medicationif SBP is <90. Patients BP is 90/64.
Pt SVT/Vtach on tele HR up to 160s
Pt's 
pt aptt 98.8
aptt 62.1
patient O2 sat 85% on 2L and appears to be in respiratory distress

## 2023-11-21 NOTE — PROGRESS NOTE ADULT - SUBJECTIVE AND OBJECTIVE BOX
Andre Reyes, M.D.  Pager: [phone]  Office: [phone]    Patient is a 58y old  Male who presents with a chief complaint of NSTEMI (21 Nov 2023 08:14)          SUBJECTIVE / OVERNIGHT EVENTS:    No acute overnight events.    ROS: ( - ) Fever, ( - )Chills,  ( - )Nausea/Vomiting, ( - ) Cough, ( - )Shortness of breath, ( - )Chest Pain    MEDICATIONS  (STANDING):  aMIOdarone    Tablet   Oral   aMIOdarone    Tablet 200 milliGRAM(s) Oral daily  aspirin enteric coated 81 milliGRAM(s) Oral daily  atorvastatin 80 milliGRAM(s) Oral at bedtime  chlorhexidine 2% Cloths 1 Application(s) Topical <User Schedule>  furosemide Infusion 10 mG/Hr (5 mL/Hr) IV Continuous <Continuous>  hydrALAZINE 50 milliGRAM(s) Oral every 8 hours  insulin glargine Injectable (LANTUS) 15 Unit(s) SubCutaneous at bedtime  insulin lispro (ADMELOG) corrective regimen sliding scale   SubCutaneous three times a day before meals  insulin lispro (ADMELOG) corrective regimen sliding scale   SubCutaneous at bedtime  insulin lispro Injectable (ADMELOG) 6 Unit(s) SubCutaneous three times a day before meals  iron sucrose Injectable 200 milliGRAM(s) IV Push every 24 hours  isosorbide   dinitrate Tablet (ISORDIL) 20 milliGRAM(s) Oral three times a day  metoprolol succinate ER 25 milliGRAM(s) Oral daily  milrinone Infusion 0.125 MICROgram(s)/kG/Min (2.63 mL/Hr) IV Continuous <Continuous>  pantoprazole    Tablet 40 milliGRAM(s) Oral before breakfast  senna 2 Tablet(s) Oral at bedtime  tamsulosin 0.4 milliGRAM(s) Oral at bedtime    MEDICATIONS  (PRN):  albuterol/ipratropium for Nebulization 3 milliLiter(s) Nebulizer every 6 hours PRN Respiratory Distress  polyethylene glycol 3350 17 Gram(s) Oral daily PRN Constipation          T(C): 36.7 (11-21 @ 11:00), Max: 37.1 (11-20 @ 16:48)   HR: 84   BP: 81/59   RR: 36   SpO2: 95%    PHYSICAL EXAM:    CONSTITUTIONAL: NAD, well-developed, well-groomed  EYES: PERRLA; conjunctiva and sclera clear  ENMT: Moist oral mucosa, no pharyngeal injection or exudates; normal dentition  NECK: Supple, no palpable masses; no thyromegaly  RESPIRATORY: Normal respiratory effort; lungs are clear to auscultation bilaterally  CARDIOVASCULAR: Regular rate and rhythm, normal S1 and S2, no murmur/rub/gallop; No lower extremity edema; Peripheral pulses are 2+ bilaterally  ABDOMEN: Nontender to palpation, normoactive bowel sounds, no rebound/guarding; No hepatosplenomegaly  MUSCULOSKELETAL:  no clubbing or cyanosis of digits; no joint swelling or tenderness to palpation  PSYCH: A+O to person, place, and time; affect appropriate  NEUROLOGY: CN 2-12 are intact and symmetric; no gross sensory deficits   SKIN: No rashes; no palpable lesions      LABS:                        11.1   12.29 )-----------( 280      ( 21 Nov 2023 06:29 )             37.0      11-21    134<L>  |  99  |  84<H>  ----------------------------<  150<H>  4.8   |  19<L>  |  4.15<H>    Ca    8.3<L>      21 Nov 2023 06:29  Phos  5.4     11-19  Mg     2.5     11-21    TPro  6.1  /  Alb  3.3  /  TBili  0.3  /  DBili  x   /  AST  40  /  ALT  88<H>  /  AlkPhos  135<H>  11-19       CAPILLARY BLOOD GLUCOSE      POCT Blood Glucose.: 144 mg/dL (21 Nov 2023 07:52)  POCT Blood Glucose.: 212 mg/dL (20 Nov 2023 21:16)  POCT Blood Glucose.: 213 mg/dL (20 Nov 2023 17:19)  POCT Blood Glucose.: 178 mg/dL (20 Nov 2023 13:32)      RADIOLOGY & ADDITIONAL TESTS:    Imaging Personally Reviewed:  Consultant(s) Notes Reviewed:    Care Discussed with Consultants/Other Providers:   Andre Reyes, M.D.  Pager: [phone]  Office: [phone]    Patient is a 58y old  Male who presents with a chief complaint of NSTEMI (21 Nov 2023 08:14)          SUBJECTIVE / OVERNIGHT EVENTS:    No acute overnight events.  pt remains on HFNC    ROS: ( - ) Fever, ( - )Chills,  ( - )Nausea/Vomiting, ( - ) Cough, ( - )Shortness of breath, ( - )Chest Pain    MEDICATIONS  (STANDING):  aMIOdarone    Tablet   Oral   aMIOdarone    Tablet 200 milliGRAM(s) Oral daily  aspirin enteric coated 81 milliGRAM(s) Oral daily  atorvastatin 80 milliGRAM(s) Oral at bedtime  chlorhexidine 2% Cloths 1 Application(s) Topical <User Schedule>  furosemide Infusion 10 mG/Hr (5 mL/Hr) IV Continuous <Continuous>  hydrALAZINE 50 milliGRAM(s) Oral every 8 hours  insulin glargine Injectable (LANTUS) 15 Unit(s) SubCutaneous at bedtime  insulin lispro (ADMELOG) corrective regimen sliding scale   SubCutaneous three times a day before meals  insulin lispro (ADMELOG) corrective regimen sliding scale   SubCutaneous at bedtime  insulin lispro Injectable (ADMELOG) 6 Unit(s) SubCutaneous three times a day before meals  iron sucrose Injectable 200 milliGRAM(s) IV Push every 24 hours  isosorbide   dinitrate Tablet (ISORDIL) 20 milliGRAM(s) Oral three times a day  metoprolol succinate ER 25 milliGRAM(s) Oral daily  milrinone Infusion 0.125 MICROgram(s)/kG/Min (2.63 mL/Hr) IV Continuous <Continuous>  pantoprazole    Tablet 40 milliGRAM(s) Oral before breakfast  senna 2 Tablet(s) Oral at bedtime  tamsulosin 0.4 milliGRAM(s) Oral at bedtime    MEDICATIONS  (PRN):  albuterol/ipratropium for Nebulization 3 milliLiter(s) Nebulizer every 6 hours PRN Respiratory Distress  polyethylene glycol 3350 17 Gram(s) Oral daily PRN Constipation          T(C): 36.7 (11-21 @ 11:00), Max: 37.1 (11-20 @ 16:48)   HR: 84   BP: 81/59   RR: 36   SpO2: 95%    PHYSICAL EXAM:    CONSTITUTIONAL: NAD, well-developed, well-groomed  EYES: PERRLA; conjunctiva and sclera clear  ENMT: Moist oral mucosa, no pharyngeal injection or exudates; normal dentition  NECK: Supple, no palpable masses; no thyromegaly  RESPIRATORY: Normal respiratory effort; +rales  CARDIOVASCULAR: Regular rate and rhythm, normal S1 and S2, no murmur/rub/gallop; 2+ lower extremity edema; Peripheral pulses are 2+ bilaterally  ABDOMEN: Nontender to palpation, normoactive bowel sounds, no rebound/guarding; No hepatosplenomegaly  MUSCULOSKELETAL:  no clubbing or cyanosis of digits; no joint swelling or tenderness to palpation  PSYCH: A+O to person, place, and time; affect appropriate  NEUROLOGY: CN 2-12 are intact and symmetric; no gross sensory deficits   SKIN: No rashes; no palpable lesions      LABS:                        11.1   12.29 )-----------( 280      ( 21 Nov 2023 06:29 )             37.0      11-21    134<L>  |  99  |  84<H>  ----------------------------<  150<H>  4.8   |  19<L>  |  4.15<H>    Ca    8.3<L>      21 Nov 2023 06:29  Phos  5.4     11-19  Mg     2.5     11-21    TPro  6.1  /  Alb  3.3  /  TBili  0.3  /  DBili  x   /  AST  40  /  ALT  88<H>  /  AlkPhos  135<H>  11-19       CAPILLARY BLOOD GLUCOSE      POCT Blood Glucose.: 144 mg/dL (21 Nov 2023 07:52)  POCT Blood Glucose.: 212 mg/dL (20 Nov 2023 21:16)  POCT Blood Glucose.: 213 mg/dL (20 Nov 2023 17:19)  POCT Blood Glucose.: 178 mg/dL (20 Nov 2023 13:32)      RADIOLOGY & ADDITIONAL TESTS:    Imaging Personally Reviewed:  Consultant(s) Notes Reviewed:    Care Discussed with Consultants/Other Providers:

## 2023-11-21 NOTE — PROVIDER CONTACT NOTE (OTHER) - SITUATION
Patient is scheduled for AM hydralazine and isosorbide. Both medication have a parameter to hold medication if SBP is <90. Patients BP is 90/64.
Pt lethargic not sustaining awakenings
Pt's 
patient NPO fro CT Angio with multiple blood pressure medications due this AM
patient O2 sat 85% on 2L and appears to be in respiratory distress
Pt 1701 FS 41, repeat 37. Pt treated with IVP dextrose as ordered. repeat FS 15 minutes later 127
Pt out of bed to chair c/o dizziness.
7 bts wct on tele
Patient's HR now 130s
Pt BP is 86/52 auscultated.
pt ptt 63.0
Pt SVT/Vtach on tele HR up to 160s
Patient is sustaining 78% on 50/50 hi derian nasal cannula and complaining of shortness of breath
Pt Refused CT Chest
aptt 62.1
pt aptt 98.8

## 2023-11-21 NOTE — PROGRESS NOTE ADULT - PROBLEM SELECTOR PROBLEM 7
Prophylactic measure
Sinus tachycardia
LAURA (acute kidney injury)
T2DM (type 2 diabetes mellitus)
LAURA (acute kidney injury)
Severe mitral regurgitation
CVA (cerebrovascular accident)
CVA (cerebrovascular accident)
Severe mitral regurgitation
BPH without obstruction/lower urinary tract symptoms
CVA (cerebrovascular accident)
Severe mitral regurgitation
HLD (hyperlipidemia)
CVA (cerebrovascular accident)
CVA (cerebrovascular accident)
HLD (hyperlipidemia)
CVA (cerebrovascular accident)
CVA (cerebrovascular accident)
Sinus tachycardia
T2DM (type 2 diabetes mellitus)
HLD (hyperlipidemia)
Sinus tachycardia
Severe mitral regurgitation
Severe mitral regurgitation
CVA (cerebrovascular accident)
Severe mitral regurgitation
CVA (cerebrovascular accident)
Sinus tachycardia
Sinus tachycardia

## 2023-11-21 NOTE — PROGRESS NOTE ADULT - PROBLEM SELECTOR PROBLEM 6
Discharge planning issues
T2DM (type 2 diabetes mellitus)
LV (left ventricular) mural thrombus
LAURA (acute kidney injury)
Sinus tachycardia
LAURA (acute kidney injury)
Severe mitral regurgitation
Discharge planning issues
CVA (cerebrovascular accident)
LV (left ventricular) mural thrombus
Discharge planning issues
Prophylactic measure
T2DM (type 2 diabetes mellitus)
Discharge planning issues
LV (left ventricular) mural thrombus
LAURA (acute kidney injury)
LV (left ventricular) mural thrombus
Sinus tachycardia
T2DM (type 2 diabetes mellitus)
T2DM (type 2 diabetes mellitus)
LV (left ventricular) mural thrombus
Prophylactic measure
Discharge planning issues
LV (left ventricular) mural thrombus
LV (left ventricular) mural thrombus
LAURA (acute kidney injury)
CVA (cerebrovascular accident)
LV (left ventricular) mural thrombus
Sinus tachycardia
LV (left ventricular) mural thrombus
T2DM (type 2 diabetes mellitus)
LV (left ventricular) mural thrombus
Sinus tachycardia

## 2023-11-21 NOTE — PROGRESS NOTE ADULT - PROBLEM SELECTOR PLAN 9
On 11/8 for sustained MMVT on the floor, then readmitted to CCU. Known h/o Mobitz II s/p Medtronic dual chamber ICD (12/21/22)   - EP plans noted- on amiodarone load now to 10gm, then 200mg/d
Heparin sq for DVT prophylaxis.
On 11/8 for sustained MMVT on the floor, then readmitted to CCU. Known h/o Mobitz II s/p Medtronic dual chamber ICD (12/21/22)   - EP plans noted- on amiodarone load now to 10gm, then 200mg/d
c/w Flomax 0.4mg qhs
On 11/8 for sustained MMVT on the floor, then readmitted to CCU. Known h/o Mobitz II s/p Medtronic dual chamber ICD (12/21/22)   - EP plans noted- on amiodarone load now to 10gm, then 200mg/d
A1c 10/14 8.4%  - increased Lantus 30u qhs + lispro 12u premeal + ISS  - Monitor FS
On 11/8 for sustained MMVT on the floor, then readmitted to CCU. Known h/o Mobitz II s/p Medtronic dual chamber ICD (12/21/22)   - completed 10gm amiodarone load will now c/w 200mg/daily
On 11/8 for sustained MMVT on the floor, then readmitted to CCU. Known h/o Mobitz II s/p Medtronic dual chamber ICD (12/21/22)   - EP plans noted- on amiodarone load now to 10gm, then 200mg/d
On 11/8 for sustained MMVT on the floor, then readmitted to CCU. Known h/o Mobitz II s/p Medtronic dual chamber ICD (12/21/22)   - completed 10gm amiodarone load will now c/w 200mg/daily
On 11/8 for sustained MMVT on the floor, then readmitted to CCU. Known h/o Mobitz II s/p Medtronic dual chamber ICD (12/21/22)   - EP plans noted- on amiodarone load now to 10gm, then 200mg/d
A1c 10/14 8.4%  - increased Lantus 30u qhs + lispro 12u premeal + ISS  - Monitor FS
A1c 10/14 8.4%  - increased Lantus 30u qhs + lispro 12u premeal  - ISS  - Monitor FS
Heparin sq for DVT prophylaxis
Heparin sq for DVT prophylaxis.
Continue Tamsulosin.
On 11/8 for sustained MMVT on the floor, then readmitted to CCU. Known h/o Mobitz II s/p Medtronic dual chamber ICD (12/21/22)   - EP plans noted- on amiodarone load now to 10gm, then 200mg/d
On 11/8 for sustained MMVT on the floor, then readmitted to CCU. Known h/o Mobitz II s/p Medtronic dual chamber ICD (12/21/22)   - EP plans noted- on amiodarone load now to 10gm, then 200mg/d
A1c 10/14 8.4%  - increased Lantus 30u qhs + lispro 12u premeal  - ISS  - Monitor FS

## 2023-11-21 NOTE — PROVIDER CONTACT NOTE (OTHER) - ACTION/TREATMENT ORDERED:
ACP and respiratory therapist assessed patient at bedside alongside RN. Hi derian settings increased to 50L 100% FiO2. Patient no longer complaining of shortness of breath. BIPAP in an hour.
Keep heparin drip at 6 ml/hr. aptt at goal rate. Continue to monitor and maintain safety
OK to give amioderone, farxiga  Give metoprolol, hydralazine, isosorbide after patient gets CT angio d/t soft blood pressure
Provider came to bedside to assess patient. no interventions at this time.
Hold hydralazine and isosorbide this morning due to soft BP.
Provider at bedside. Possible deconditioning. No new interventions at this time.
Provider notified and aware. Education was provided on the importance of procedure.
Provider notified. Provider advised RN to continue to monitor the pt.
continue to monitor and maintain safety.
Awaiting new orders
Provider at bedside to assess patient  placed on continuous BIPAP  ABG ordered  Lasix ordered and administered
Keep heparin gtt at current rate 6 mL/hr, check ptt in 6 hours
Per provider order Follow bp parameters and push non parameter meds to 8am. Will reassess.
Provider made aware at bedside- RRT called. see RRT sheet.
NP will order new rate. Continue to monitor and maintain safety.

## 2023-11-21 NOTE — PROGRESS NOTE ADULT - PROBLEM SELECTOR PROBLEM 5
Tachycardia
CVA (cerebrovascular accident)
Severe mitral regurgitation
Tachycardia
BPH without obstruction/lower urinary tract symptoms
BPH without obstruction/lower urinary tract symptoms
Vertebral artery stenosis
Acute hypoxic respiratory failure
T2DM (type 2 diabetes mellitus)
T2DM (type 2 diabetes mellitus)
Vertebral artery stenosis
LV (left ventricular) mural thrombus
Acute hypoxic respiratory failure
Tachycardia
Acute hypoxic respiratory failure
Severe mitral regurgitation
Tachycardia
Vertebral artery stenosis
Sinus tachycardia
Tachycardia
BPH without obstruction/lower urinary tract symptoms
BPH without obstruction/lower urinary tract symptoms
Severe mitral regurgitation
Sinus tachycardia
Tachycardia
NSTEMI (non-ST elevation myocardial infarction)
Sinus tachycardia
Vertebral artery stenosis
Acute hypoxic respiratory failure
Sinus tachycardia
T2DM (type 2 diabetes mellitus)
Vertebral artery stenosis
BPH without obstruction/lower urinary tract symptoms
Acute hypoxic respiratory failure
CVA (cerebrovascular accident)
LV (left ventricular) mural thrombus
LV (left ventricular) mural thrombus
NSTEMI (non-ST elevation myocardial infarction)
Acute hypoxic respiratory failure
Acute hypoxic respiratory failure
LAURA (acute kidney injury)
Acute hypoxic respiratory failure
Acute hypoxic respiratory failure
NSTEMI (non-ST elevation myocardial infarction)
LAURA (acute kidney injury)
NSTEMI (non-ST elevation myocardial infarction)

## 2023-11-21 NOTE — PROGRESS NOTE ADULT - ASSESSMENT
59 yo Male pmhx CVA with mild left sided deficits, HTN, DM2, vertigo, HLD, Mobitz II s/pp Medtronic dual chamber ICD (12/21/22) initially presented to [facility] from home with complaints of dyspnea and chest pain and was admitted w/ acute hypoxic respiratory failure likely due to acute pulmonary edema due to acute HFrEF in setting of acute NSTEMI, LAURA and enterovirus infection. Pt with brief MICU stay at [facility] requiring bipap (no intubation), was treated for PNA with cefepime, and was found to have TTE done 9/26/23 showing EF 20% with severely decreased LVSF, multiple regional wall motion abnormalities, and elevated LV end diastolic pressure. Pt was transferred to [facility] for cardiac evaluation. LHC performed which revealed severe 3v CAD with critical proximal LAD involvement. CTS was consulted for CA Found to have LV thrombus. AMS prompting CT head with angio on 11/6 revealing R medial cerebellar infarct. MRI showing evolving PICA stroke and severe stenosis of L vertebral artery and occlusion o nondominant R vertebral artery.R cerebellar/PICA infarct as well as L parietal embolic infarct, likely cardioembolic neurosx plans for cerebral angio.    [facility] 10/30  CTA 11/6    1- LAURA   2- CVA  3- cardiomyopathy  4- NSTEMI/3 vessel disease  5- CHF   6- htn       worsening LAURA in setting of decompensated CHF, as well as, lower BP  creatinine continues to worsen  transitioned to lasix 10mg/hr overnight  remains in decompensated CHF  hydralazine 50 mg tid/isosorbide 20mg TID for htn and afterload reduction as per HF  missing doses due to hypotension  for RHC  now transferred to cicu for inotropic support  started on milrinone drip  flomax 0.4 mg daily  strict I/O  trend creatinine and electrolytes daily  neuro planning neuro angio, however now with laura, and will have high risk contrast nephropathy. recommend hold off on angio, unless it becomes urgent/emergent.

## 2023-11-21 NOTE — PROGRESS NOTE ADULT - PROBLEM SELECTOR PLAN 2
baseline 1.28 on Oct 15, 2023. Likely in setting of cardiorenal vs. ATN from hypotension.  - s/p cardiomems with elevated pressure  - s/p IV bumex with hypertonic saline  - renal US with No hydronephrosis. Increased cortical echogenicity, suggestive of renal parenchymal disease  - appreciate ongoing nephrology recommendations, holding off on angiogram at this time  - Cr uptrending  Creatinine Trend: 4.21<--, 4.15<--, 3.76<--, 3.59<--, 3.08<--, 3.30<--

## 2023-11-21 NOTE — PROGRESS NOTE ADULT - TIME BILLING
Time-based billing (NON-critical care).     The necessity of the time spent during the encounter on this date of service was due to:     - Ordering, reviewing, and interpreting labs, testing, and imaging.  - Independently obtaining a review of systems and performing a physical exam  - Reviewing prior hospitalization and where necessary, outpatient records.  - Counselling and educating patient and family regarding interpretation of aforementioned items and plan of care.
patient encounter, reviewing tests and imaging, independently obtaining a history, performing a physical examination, discussing the plan with the patient, ordering medications/tests, documenting clinical information, and coordinating care. This excludes any time spent on teaching.
review of records/results/imaging, pulmonary evaluation/assessment/documentation, and discussion with patient/medical team
Time-based billing (NON-critical care).     The necessity of the time spent during the encounter on this date of service was due to:     - Ordering, reviewing, and interpreting labs, testing, and imaging.  - Independently obtaining a review of systems and performing a physical exam  - Reviewing prior hospitalization and where necessary, outpatient records.  - Counselling and educating patient and/or family regarding interpretation of aforementioned items and plan of care.
review of records/results/imaging, pulmonary evaluation/assessment/documentation, and discussion with patient/CICU team
Chart review, exam, documentation, d/w team.
Review of medical records, labs, imaging and coordination of care.
Time-based billing (NON-critical care).     The necessity of the time spent during the encounter on this date of service was due to:     - Ordering, reviewing, and interpreting labs, testing, and imaging.  - Independently obtaining a review of systems and performing a physical exam  - Reviewing prior hospitalization and where necessary, outpatient records.  - Counselling and educating patient and/or family regarding interpretation of aforementioned items and plan of care.
Time-based billing (NON-critical care).     The necessity of the time spent during the encounter on this date of service was due to:     - Ordering, reviewing, and interpreting labs, testing, and imaging.  - Independently obtaining a review of systems and performing a physical exam  - Reviewing prior hospitalization and where necessary, outpatient records.  - Counselling and educating patient and/or family regarding interpretation of aforementioned items and plan of care.
review of records/results/imaging, pulmonary evaluation/assessment/documentation, and discussion with patient/CICU team
Time-based billing (NON-critical care).     The necessity of the time spent during the encounter on this date of service was due to:     - Ordering, reviewing, and interpreting labs, testing, and imaging.  - Independently obtaining a review of systems and performing a physical exam  - Reviewing prior hospitalization and where necessary, outpatient records.  - Counselling and educating patient regarding interpretation of aforementioned items and plan of care.
patient encounter, reviewing tests and imaging, independently obtaining a history, performing a physical examination, discussing the plan with the patient, ordering medications/tests, documenting clinical information, and coordinating care. This excludes any time spent on teaching.
patient encounter, reviewing tests and imaging, independently obtaining a history, performing a physical examination, discussing the plan with the patient, ordering medications/tests, documenting clinical information, and coordinating care. This excludes any time spent on teaching.
Time-based billing (NON-critical care).     The necessity of the time spent during the encounter on this date of service was due to:     - Ordering, reviewing, and interpreting labs, testing, and imaging.  - Independently obtaining a review of systems and performing a physical exam  - Reviewing prior hospitalization and where necessary, outpatient records.  - Counselling and educating patient and/or family regarding interpretation of aforementioned items and plan of care.
Time-based billing (NON-critical care).     The necessity of the time spent during the encounter on this date of service was due to:     - Ordering, reviewing, and interpreting labs, testing, and imaging.  - Independently obtaining a review of systems and performing a physical exam  - Reviewing prior hospitalization and where necessary, outpatient records.  - Counselling and educating patient  regarding interpretation of aforementioned items and plan of care.
Time-based billing (NON-critical care).     The necessity of the time spent during the encounter on this date of service was due to:     - Ordering, reviewing, and interpreting labs, testing, and imaging.  - Independently obtaining a review of systems and performing a physical exam  - Reviewing prior hospitalization and where necessary, outpatient records.  - Counselling and educating patient and/or family regarding interpretation of aforementioned items and plan of care.
- Review of records, telemetry, vital signs and daily labs.   - General and cardiovascular physical examination.  - Generation of cardiovascular treatment plan.  - Coordination of care with primary team.  - Decision making discussion with pt and family members
Time-based billing (NON-critical care).     The necessity of the time spent during the encounter on this date of service was due to:     - Ordering, reviewing, and interpreting labs, testing, and imaging.  - Independently obtaining a review of systems and performing a physical exam  - Reviewing prior hospitalization and where necessary, outpatient records.  - Counselling and educating patient  regarding interpretation of aforementioned items and plan of care.
- Review of records, telemetry, vital signs and daily labs.   - General and cardiovascular physical examination.  - Generation of cardiovascular treatment plan.  - Coordination of care with primary team.  - Decision making discussion with pt and family members
Time-based billing (NON-critical care).     The necessity of the time spent during the encounter on this date of service was due to:     - Ordering, reviewing, and interpreting labs, testing, and imaging.  - Independently obtaining a review of systems and performing a physical exam  - Reviewing prior hospitalization and where necessary, outpatient records.  - Counselling and educating patient  regarding interpretation of aforementioned items and plan of care.
patient encounter, reviewing tests and imaging, independently obtaining a history, performing a physical examination, discussing the plan with the patient, ordering medications/tests, documenting clinical information, and coordinating care. This excludes any time spent on teaching.
- Generation of cardiovascular treatment plan.  - Coordination of care with primary team.
- Generation of cardiovascular treatment plan.  - Coordination of care with primary team.

## 2023-11-21 NOTE — PROGRESS NOTE ADULT - SUBJECTIVE AND OBJECTIVE BOX
BRANDEN MARRUFO  MRN-[id number]  Patient is a 58y old  Male who presents with a chief complaint of NSTEMI (21 Nov 2023 12:21)    HPI:  Patient with separate chart from [facility], MRN# [id number]    57 yo Male pmhx CVA with mild left sided deficits, HTN, DM2, vertigo, HLD, Mobitz II s/pp Medtronic dual chamber ICD (12/21/22) initially presented to [facility] from home with complaints of dyspnea and chest pain and was admitted w/ acute hypoxic respiratory failure likely due to acute pulmonary edema due to acute HFrEF in setting of acute NSTEMI, LAURA and enterovirus infection. Pt with brief MICU stay at [facility] requiring bipap (no intubation), was treated for PNA with cefepime, and was found to have TTE done 9/26/23 showing EF 20% with severely decreased LVSF, multiple regional wall motion abnormalities, and elevated LV end diastolic pressure. Pt was transferred to [facility] for cardiac cath evaluation. Patient without any acute complaints, complaining of intermittent palpitations for the last 2 days. No chest pain, SOB, fevers, nausea, vomiting, abdominal pain.  (13 Oct 2023 21:05)      24 HOUR EVENTS:  - Started on BiPAP 10/5 90% for AHRF  - Aggressive diuresis with bumex gtt and milrinone .125  - Made comfort measures by wife after discussion with CICU attending Dr. Penny  - Transition from BiPAP to HF for comfort    REVIEW OF SYSTEMS:  CONSTITUTIONAL: No weakness, fevers or chills  EYES/ENT: No visual changes;  No vertigo or throat pain   NECK: No pain or stiffness  RESPIRATORY: (+) SOB. No cough, wheezing, hemoptysis  CARDIOVASCULAR: No chest pain or palpitations  GASTROINTESTINAL: No abdominal or epigastric pain. No nausea, vomiting, or hematemesis; No diarrhea or constipation. No melena or hematochezia.  GENITOURINARY: No dysuria, frequency or hematuria  NEUROLOGICAL: No numbness or weakness  SKIN: No itching, rashes      ICU Vital Signs Last 24 Hrs  T(C): 36.7 (21 Nov 2023 11:00), Max: 36.7 (20 Nov 2023 20:19)  T(F): 98 (21 Nov 2023 11:00), Max: 98.1 (20 Nov 2023 20:19)  HR: 69 (21 Nov 2023 19:58) (69 - 98)  BP: 81/59 (21 Nov 2023 11:00) (81/59 - 96/62)  BP(mean): 66 (21 Nov 2023 11:00) (66 - 73)  ABP: 83/48 (21 Nov 2023 19:00) (80/48 - 91/53)  ABP(mean): 60 (21 Nov 2023 19:00) (58 - 66)  RR: 45 (21 Nov 2023 19:58) (19 - 45)  SpO2: 85% (21 Nov 2023 19:58) (78% - 99%)    O2 Parameters below as of 21 Nov 2023 19:58  Patient On (Oxygen Delivery Method): nasal cannula, high flow  O2 Flow (L/min): 60  O2 Concentration (%): 100        CVP(mm Hg): 285 (11-21-23 @ 13:00) (285 - 285)  CO: --  CI: --  PA: --  PA(mean): --  PA(direct): --  PCWP: --  LA: --  RA: --  SVR: --  SVRI: --  PVR: --  PVRI: --  I&O's Summary    20 Nov 2023 07:01  -  21 Nov 2023 07:00  --------------------------------------------------------  IN: 830 mL / OUT: 850 mL / NET: -20 mL    21 Nov 2023 07:01  -  21 Nov 2023 20:14  --------------------------------------------------------  IN: 252 mL / OUT: 315 mL / NET: -63 mL        CAPILLARY BLOOD GLUCOSE    CAPILLARY BLOOD GLUCOSE      POCT Blood Glucose.: 175 mg/dL (21 Nov 2023 15:58)      PHYSICAL EXAM:  GENERAL: No acute distress, well-developed  HEAD:  Atraumatic, Normocephalic  EYES: EOMI, PERRLA, conjunctiva and sclera clear  NECK: Supple, no lymphadenopathy, no JVD  CHEST/LUNG: (+) Crackles B/L with decreased breath sounds  HEART: Regular rate and rhythm. Normal S1/S2. No murmurs, rubs, or gallops  ABDOMEN: Soft, non-tender, non-distended; normal bowel sounds, no organomegaly  EXTREMITIES:  +1 peripheral pulses b/l, No clubbing, cyanosis, or edema  NEUROLOGY: A&O x 1-2  SKIN: No rashes or lesions    ============================I/O===========================   I&O's Detail    20 Nov 2023 07:01  -  21 Nov 2023 07:00  --------------------------------------------------------  IN:    Furosemide: 90 mL    Oral Fluid: 740 mL  Total IN: 830 mL    OUT:    Indwelling Catheter - Urethral (mL): 400 mL    Voided (mL): 450 mL  Total OUT: 850 mL    Total NET: -20 mL      21 Nov 2023 07:01  -  21 Nov 2023 20:14  --------------------------------------------------------  IN:    Bumetanide: 40 mL    Furosemide: 40 mL    IV PiggyBack: 150 mL    Milrinone: 13 mL    Vasopressin: 9 mL  Total IN: 252 mL    OUT:    Indwelling Catheter - Urethral (mL): 315 mL  Total OUT: 315 mL    Total NET: -63 mL        ============================ LABS =========================                        10.9   13.48 )-----------( 262      ( 21 Nov 2023 14:19 )             35.3     11-21    133<L>  |  99  |  90<H>  ----------------------------<  191<H>  5.0   |  17<L>  |  4.21<H>    Ca    8.3<L>      21 Nov 2023 14:19  Phos  6.2     11-21  Mg     2.4     11-21    TPro  5.9<L>  /  Alb  3.0<L>  /  TBili  0.5  /  DBili  x   /  AST  65<H>  /  ALT  164<H>  /  AlkPhos  130<H>  11-21                LIVER FUNCTIONS - ( 21 Nov 2023 14:19 )  Alb: 3.0 g/dL / Pro: 5.9 g/dL / ALK PHOS: 130 U/L / ALT: 164 U/L / AST: 65 U/L / GGT: x           PT/INR - ( 21 Nov 2023 14:19 )   PT: 34.0 sec;   INR: 3.20 ratio         PTT - ( 21 Nov 2023 14:19 )  PTT:37.1 sec  ABG - ( 21 Nov 2023 18:10 )  pH, Arterial: 7.26  pH, Blood: x     /  pCO2: 44    /  pO2: 75    / HCO3: 20    / Base Excess: -7.1  /  SaO2: 93.7              Blood Gas Arterial, Lactate: 1.6 mmol/L (11-21-23 @ 18:10)  Blood Gas Arterial, Lactate: 1.6 mmol/L (11-21-23 @ 14:15)  Blood Gas Arterial, Lactate: 1.7 mmol/L (11-19-23 @ 22:24)    Urinalysis Basic - ( 21 Nov 2023 14:19 )    Color: x / Appearance: x / SG: x / pH: x  Gluc: 191 mg/dL / Ketone: x  / Bili: x / Urobili: x   Blood: x / Protein: x / Nitrite: x   Leuk Esterase: x / RBC: x / WBC x   Sq Epi: x / Non Sq Epi: x / Bacteria: x      ======================Micro/Rad/Cardio=================  Telemetry: Reviewed   EKG: Reviewed  CXR: Reviewed  Culture: Reviewed   Echo:   Cath:

## 2023-11-21 NOTE — PROGRESS NOTE ADULT - PROBLEM SELECTOR PROBLEM 4
CVA (cerebrovascular accident)
NSTEMI (non-ST elevation myocardial infarction)
LAURA (acute kidney injury)
LV (left ventricular) mural thrombus
LV (left ventricular) mural thrombus
NSTEMI (non-ST elevation myocardial infarction)
Severe mitral regurgitation
Severe mitral regurgitation
CVA (cerebrovascular accident)
Severe mitral regurgitation
NSTEMI (non-ST elevation myocardial infarction)
Severe mitral regurgitation
T2DM (type 2 diabetes mellitus)
LAURA (acute kidney injury)
LAURA (acute kidney injury)
LV (left ventricular) mural thrombus
NSTEMI (non-ST elevation myocardial infarction)
Severe mitral regurgitation
Severe mitral regurgitation
LAURA (acute kidney injury)
T2DM (type 2 diabetes mellitus)
Acute hypoxic respiratory failure
Severe mitral regurgitation
CVA (cerebrovascular accident)
LAURA (acute kidney injury)
LV (left ventricular) mural thrombus
NSTEMI (non-ST elevation myocardial infarction)
NSTEMI (non-ST elevation myocardial infarction)
Severe mitral regurgitation
CVA (cerebrovascular accident)
LAURA (acute kidney injury)
NSTEMI (non-ST elevation myocardial infarction)
T2DM (type 2 diabetes mellitus)
LAURA (acute kidney injury)
LV (left ventricular) mural thrombus
Severe mitral regurgitation
LAURA (acute kidney injury)
NSTEMI (non-ST elevation myocardial infarction)
T2DM (type 2 diabetes mellitus)
T2DM (type 2 diabetes mellitus)
CVA (cerebrovascular accident)
Acute hypoxic respiratory failure
LAURA (acute kidney injury)
T2DM (type 2 diabetes mellitus)
T2DM (type 2 diabetes mellitus)

## 2023-11-21 NOTE — PROCEDURE NOTE - NSINDICATIONS_GEN_A_CORE
hemodynamic monitoring
arterial puncture to obtain ABG's/blood sampling/critical patient
arterial puncture to obtain ABG's/blood sampling/critical patient/monitoring purposes

## 2023-11-21 NOTE — PROGRESS NOTE ADULT - PROBLEM SELECTOR PLAN 5
- MRI head 11/10 with occluded nondominant R vertebral artery, stenosis V4 segment dominant L vertebral artery and significant L M1 stenosis  - Neurosurgery following, who plans for IR intervention outpatient once Cr improves
- Pt has been HR 120s for over a week minimally responsive to digoxin  - EKG showing possible 2:1 AFlutter vs AT vs sinus with artifact  - F/U device interrogation  - Currently ST per EP. Weaning milrinone as above
- Medical management
Likely due to acute systolic HF, Treated for PNA at [facility]  - wean down oxygen as tolerated  - no plans for thoracentesis  - outpatient f/u for 6mm Pulmonary nodule
- Pt has been HR 120s for over a week minimally responsive to digoxin  - EKG showing possible 2:1 AFlutter vs AT vs sinus with artifact  - F/U device interrogation  - Currently ST per EP
- Pt has been HR 120s for over a week minimally responsive to digoxin  - EKG showing possible 2:1 AFlutter vs AT vs sinus with artifact  - F/U device interrogation  - If AF may benefit from DCCV (ok iso LVT per EP, but would formally clarify)
Continue Flomax
Echo shows LV thrombus  - c/w IV heparin gtt, bridging with coumadin
- Medical management
Continue Flomax
A1c 10/14 8.4%  On Lantus 30u qhs + lispro 10u premeal + ISS  FS have been borderline- 74 this am- reduced to 24-8/8/8- monitor.
Possible reflex tachycardiac iso hypoxia +/- NSTEMI +/- ADHF.      Has ICD in place and notably with high burden of RV pacing.
- MRI head 11/10 with occluded nondominant R vertebral artery, stenosis V4 segment dominant L vertebral artery and significant L M1 stenosis  - Neurosurgery following, who plans for IR intervention outpatient once Cr improves
- Medical management
MRI brain with evolving acute/subacute R PICA infarct with mild edema and mild hemorrhagic transformation. also with acute subacute left parietal infarct also embolic. 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  occluded R VA. severe L VA stenosis   - appreciated Neurology plans  - Repeat CTH stable   - c/w ASA, statin, AC for LV thrombus
Possible reflex tachycardiac iso hypoxia +/- NSTEMI +/- ADHF.      Has ICD in place and notably with high burden of RV pacing
Continue Flomax
- MRI head 11/10 with occluded nondominant R vertebral artery, stenosis V4 segment dominant L vertebral artery and significant L M1 stenosis  - Neurosurgery following, who plans for IR intervention outpatient once Cr improves
- MRI head 11/10 with occluded nondominant R vertebral artery, stenosis V4 segment dominant L vertebral artery and significant L M1 stenosis  - Neurosurgery following, who plans for IR intervention outpatient once Cr improves
- Pt has been HR 120s for over a week minimally responsive to digoxin  - EKG showing possible 2:1 AFlutter vs AT vs sinus with artifact  - F/U device interrogation  - Currently ST per EP. Weaned off milrinone with improved HR trend
Last Cr 1.75  Will continue to monitor with diuresis(now on PO).
Likely due to acute systolic HF, Treated for PNA at [facility]  - wean down oxygen as tolerated  - no plans for thoracentesis  - outpatient f/u for 6mm Pulmonary nodule
Likely due to acute systolic HF, Treated for PNA at [facility]  - wean down oxygen as tolerated  - no plans for thoracentesis  - outpatient f/u for 6mm Pulmonary nodule
MRI brain with evolving acute/subacute R PICA infarct with mild edema and mild hemorrhagic transformation. also with acute subacute left parietal infarct also embolic. 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  occluded R VA. severe L VA stenosis   - appreciated Neurology plans  - Repeat CTH stable   - c/w ASA, statin, AC for LV thrombus
- Pt has been HR 120s for over a week minimally responsive to digoxin  - EKG showing possible 2:1 AFlutter vs AT vs sinus with artifact  - F/U device interrogation  - Currently ST per EP. Weaning off milrinone as above
A1c 10/14 8.4%  On Lantus 30u qhs + lispro 10u premeal + ISS  FS have been borderline- 74 this am- reduced to 24-8/8/8- monitor
[facility] 10/16 with 3v disease: 95% pLAD, 80% mid and distal LCx, 90% mid and distal RCA  - Per CT surg, pt is not a good candidate for CABG given lack of viability in the LAD territory  - F/U cardiology recs, re: high risk PCI vs further intervention; EP recommended no indication for upgrade at this time.
- MRI head 11/10 with occluded nondominant R vertebral artery, stenosis V4 segment dominant L vertebral artery and significant L M1 stenosis  - Neurosurgery following, who plans for IR intervention once optimized and Cr improves
Likely due to acute systolic HF, Treated for PNA at [facility]  - wean down oxygen as tolerated  - no plans for thoracentesis  - outpatient f/u for 6mm Pulmonary nodule
[facility] 10/16 with 3v disease: 95% pLAD, 80% mid and distal LCx, 90% mid and distal RCA  - Per CT surg, pt is not a good candidate for CABG given lack of viability in the LAD territory  - F/u cardiology recs, re: high risk PCI vs further intervention; EP recommended no indication for upgrade at this time.
- Pt has been HR 120s for over a week minimally responsive to digoxin  - EKG showing possible 2:1 AFlutter vs AT vs sinus with artifact  - F/U device interrogation  - Currently ST per EP. Weaned off milrinone with improved HR trend
Continue Flomax
Echo shows LV thrombus  - INR is therapeutic, will stop dose coumadin and stop Hep gtt
[facility] 10/16 with 3v disease: 95% pLAD, 80% mid and distal LCx, 90% mid and distal RCA  - Per CT surg, pt is not a good candidate for CABG given lack of viability in the LAD territory  - F/U cardiology recs, re: high risk PCI vs further intervention; EP recommended no indication for upgrade at this time.
Likely due to acute systolic HF, Treated for PNA at [facility]  - wean down oxygen as tolerated  - no plans for thoracentesis  - outpatient f/u for 6mm Pulmonary nodule
- Seen 10/26/23 on limited TTE  - AC with heparin infusion, please resume post RHC when able
Continue Flomax
A1c 10/14 8.4%  On Lantus 30u qhs + lispro 10u premeal + ISS  FS have been borderline- 74 this am- reduced to 24-8/8/8- monitor.
Secondary to cardiorenal syndrome with component of ATN from cardiogenic shock, improving  - cont to monitor sCr daily  - avoid nephrotoxins
Sinus tachycardia on telemetry. May be in setting of cardiac ischemia vs hypovolemia, dehydration vs infection. Pt not in pain, not febrile.   - Monitor on telemetry  - Continue metoprolol
Sinus tachycardia on telemetry. May be in setting of cardiac ischemia vs hypovolemia, dehydration vs infection. Pt not in pain, not febrile.   - Monitor on telemetry  - Continue metoprolol
Likely due to acute systolic HF, Treated for PNA at [facility]  - wean down oxygen as tolerated  - no plans for thoracentesis  - outpatient f/u for 6mm Pulmonary nodule
Likely due to acute systolic HF, Treated for PNA at [facility]  - wean down oxygen as tolerated  - no plans for thoracentesis  - outpatient f/u for 6mm Pulmonary nodule
[facility] 10/16 with 3v disease: 95% pLAD, 80% mid and distal LCx, 90% mid and distal RCA  - Per CT surg, pt is not a good candidate for CABG given lack of viability in the LAD territory  - F/u cardiology recs, re: high risk PCI vs further intervention; EP recommended no indication for upgrade at this time.
Likely due to acute systolic HF, Treated for PNA at [facility]  - wean down oxygen as tolerated  - no plans for thoracentesis  - outpatient f/u for 6mm Pulmonary nodule
Likely due to acute systolic HF, Treated for PNA at [facility]  - wean down oxygen as tolerated  - no plans for thoracentesis  - outpatient f/u for 6mm Pulmonary nodule

## 2023-11-21 NOTE — PROGRESS NOTE ADULT - ASSESSMENT
58M PMH prior CVA (2012) with mild L sided weakness, Mobitz II s/p Medtronic dual chamber ICD (12/21/22) DM2, HTN, HLD, admitted in CCU as a transfer from [facility] for NSTEMI on 10/13/23. His hospital course has been complicated by acute systolic dysfunction, LV thrombus, and possible acute stroke, s/p [facility] with triple vessel disease and MRI brain with significant stenosis, initially scheduled for angiogram but now with LAURA and procedure on hold. Now with acute hypoxic respiratory failure due to CHF exacerbation

## 2023-11-21 NOTE — PROGRESS NOTE ADULT - ASSESSMENT
58M with previous CVA, Mobitz II heart block s/p AICD, DM2, HTN in hospital for prolonged course involving cerebellar ischemia, NSTEMI c/b by LV thrombus and sustained MMVT, pulmonary edema requiring noninvasive positive pressure ventilatory support. He is upgraded to the CICU due to respiratory distress in the setting of the worsening pulmonary edema.    PLAN  ===Neuro===  #AMS with new CVA  CTA (11/6) shows R medial cerebellar infarct, MRA (11/10) shows PICA stroke and severe L vertebral artery stenosis. Neurology recommending PCI  - oral aspirin 81mg daily  - oral atorvastatin 80mg at bedtime  - oral warfarin dosed by PT/INR daily    ===Respiratory===  #Hypoxic Respiratory Failure  Chest xray shows persistent bilateral pulmonary edema  - BiPAP 12/5/14/50%  - repeat blood gas pending  - IV furosemide (500mg/250mL) infusion @ 5mL/hr (10mg/hr)  - IV furosemide 40mg IV push as needed  - s/p bumetanide 2mg twice daily    ===Cardiovascular===  #Acute decompensated heart failure  - diuresis as noted above  - IV milrinone infusion @ 0.125mcg/kg/min  - oral isosorbide dinitrate 20mg three times daily  - oral metoprolol succinate 25mg daily  - oral hydralazine 50mg every 8 hours (hold for SBP < 90)    #Monomorphic ventricular tachycardia  - oral amiodarone 200mg daily    ===GI===  - Diet: NPO (advance with improved respiratory status)  - oral polyethylene glycol 3350 17g daily PRN  - oral senna 2 tabs at bedtime  - oral pantoprazole 40mg daily    ===Renal===  #Acute Kidney Injury  Likely cardiorenal in nature  - Cr Trend: 4.15 (11-21 @ 06:29), 3.76 (11-20 @ 06:50), 3.59 (11-19 @ 22:23)  - oral tamsulosin 0.4mg daily  - appreciate nephrology recommendations    ===Infectious Disease===  No active issues    ===Heme/Onc===  #LV Thrombus  Patient currently supratherapeutic INR  - anticoagulation as described above    ===Endocrine===  #Diabetes Mellitus (A1C 8.4)  - subcutaneous insulin lispro 6U before meals  - subcutaneous insulin glargine 15U at bedtime    ===Lines===  - PIV x 1    ===GOC===  #DNR/DNI, comfort measures only  - After conversations between Dr. Penny and patient's wife Mame Marquez patient made DNR/DNI with comfort measures  - Given Ativan and Dilaudid  - Bumex and milrinone gtt discontinued  - vaso capped at 0.6      Patient requires continuous monitoring with bedside rhythm monitoring, pulse ox monitoring, and intermittent blood gas analysis. Care plan discussed with ICU care team. Patient remained critical and at risk for life threatening decompensation.  Patient seen, examined and plan discussed with CCU team during rounds.     I have personally provided 35 minutes of critical care time excluding time spent on separate procedures, in addition to initial critical care time provided by the CICU Attending, Dr. Penny

## 2023-11-21 NOTE — PROVIDER CONTACT NOTE (OTHER) - BACKGROUND
57 yo M admitted with chest pain. pmhx of CVA with mild left sided deficits, HTN, Mobitz II s/p Medtronic dual chamber ICD. s/p C 10/16- s/p C 10/19. R cerebellar/PICA infarct. on heparin gtt
@ 1708 pt had 39 seconds of VT, amio 150 IVPB given as ordered. BMP sent
Patient has been 120s all day, patient admitted with triple vessel disease  Patient on 25 mg metoprolol daily
Dx: chest pain  Hx: HTN, HLD, DM2, CVA
Pt's baseline is STach on tele monitor and Dynamed monitor.
admitting diagnosis chest pain
57 yo M pmhx CVA with mild left sided deficits, HTN, DM2, Mobitz II s/p medtronic dual chamber ICD transferred here for an NSTEMI. admitted with chest pain. had code stroke and RRT yesterday.
admitting diagnosis chest pain
admitting diagnosis chest pain
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 Had CVA at that time.    Dx:	NSTEMI s/p LHC showing MVD
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 Had CVA at that time.
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 Had CVA at that time
58 yr M. Admitted with chest pain.
Dx: chest pain  Hx: HTN, DM2, CVA, anemia, hypoxia
Pt admitted for Chest pain.
admitting diagnosis chest pain

## 2023-11-21 NOTE — PROGRESS NOTE ADULT - ASSESSMENT
59 YO M with history of CVA with residual mild R paresthesias, second degree Mobitz II heart block s/p DC-ICD 12/2022 (initial concern for sarcoid but negative biopsy/PET post procedure), DM2 (A1c 8.4%), and HTN who was admitted to [facility] with chest pain and dyspnea, found to have NSTEMI with markedly elevated troponins and new severe LV dysfunction with regional wall motion abnormalities as well as + enterovirus. He required NIPPV and was diuresed before being transferred to [facility] where LHC performed which revealed severe 3v CAD with critical proximal LAD involvement. CTS was consulted for CABG evaluation and HF consulted for comanagement. Given concern for low flow status sent back to CCU 10/28. Found to have LV thrombus. AMS prompting CT head with angio on 11/6 revealing R medial cerebellar infarct. MRI showing evolving PICA stroke and severe stenosis of L vertebral artery and occlusion o nondominant R vertebral artery. Given evolving stroke, brain lesion and ongoing confusion, not a candidate for coronary intervention. Additionally, per CTS review, no LAD viability. on 11/8, exhibited sustained MMVT that fell into monitoring zone of ICD that spontaneously terminated. Initiated on PO Amio load. Tolerated wean off Milrinone 11/1 with escalation of vasodilators.     He underwent RHC/CardioMEMS [id number] for longitudinal management, hemodynamics revealing elevated filling pressures and borderline CI. Now on floors and notably declining with higher oxygen requirements Currently on Lasix gtts with minimal UOP, hypotensive and worsening renal chemistries. Concern for low output vs. declilne from un-revascularized ischemia. Will arrange for transfer to CICU of bedside hemodynamics.       Cardiac Studies  ·	RHC w/ CardioMEMS: RA 14 (v to 16), PA 46/24/32, PCWP 25 (v to 30), %, PA 56.8%, CO/CI (F) 4.2/2.19, /65 (86)  ·	TTE limited 11/8/23: LVEF <20%, compared to prior LV thrombus much smaller  ·	TTE 10/26/23: EF <20% LVT present, small pericardial effusion w/o tamponade  ·	RHC 10/19/23: RA 10, RV 47/9/13, Wedge 29, PA 41/26/33, CO/CI 4.4/2.3, PA sat 57.3  ·	CMR 10/18/23: There is greater than 50% thickness subendocardial LGE within the mid anteroseptum, anterior and anterolateral segments and apical segments  ·	TTE 10/16/23: LV 5.5 cm, LVEF 20% with regional WMAs worse in the apex, LVOT VTI 8 cm, normal RV size/function, severe functional MR, small pericardial effusion, estimated RA pressure 8 mmHg   ·	LHC 10/16/23: 95% discrete proximal LAD disease and sequential multifocal disease with good distal target, 80-90% proximal LCx disease just prior to marginals, severe multifocal RCA disease with good targets   ·	TTE 12/2022: normal LVEF     Hemodynamics  10/30 RHC: RHC: RA 14/13/12, RV 51/14, PA 47/34/39, PCW 34/36/32, CI 2.5/CO4.95   11/1/23 CVP 13, PA 48/23/35, mVO2 62.6%, Cris CI 2.4  10/21/23: RA 13 with V-wave 21, PA 50/33, PCW 33, MvO2 68.2, Cris CO/CI 4.4/2.56

## 2023-11-21 NOTE — PROGRESS NOTE ADULT - NS ATTEND AMEND GEN_ALL_CORE FT
Overnight patient continued to rely on BIPAP for oxygenation  His urine output decreased overnight despite being on a lasix drip and his creatinine mallory  He was transferred to the CCU for inotrope initiation and hemodynamic monitoring  Continue with lasix drip at 10 and will add milrinone   Place a swan lilian cathether  The patient has been discussed during MDR meetings in the past. He was not a CABG candidate due to lack of viability in his anterior wall. He unfortunately has suffered new acute vertebral strokes on this admission. He also has demonstrated to team members a lack of insight and difficulty retaining information likely due  to his acute critical illness. Neurology and neurosurgery had weighed in and he would be high risk for further strokes during a pump run. He is not a candidate for PCI due to his recent strokes and now LAURA. Given these issues he is currently not an advanced therapies candidacy. He is tenuous and we will continue to follow closely

## 2023-11-21 NOTE — PROCEDURE NOTE - PROCEDURE DATE TIME, MLM
17-Oct-2023 11:27
26-Oct-2023
18-Oct-2023 14:26
18-Oct-2023 16:53
21-Oct-2023 00:44
20-Oct-2023 19:38
21-Nov-2023 13:00
21-Oct-2023 00:47

## 2023-11-21 NOTE — PROCEDURE NOTE - NSPERFORMEDBY_GEN_A_CORE
Myself
Myself
MDT rep/Other
Myself
MDT rep/Other
Myself
Medtronic rep/Other
Myself
Medtronic rep/Other
Myself

## 2023-11-21 NOTE — PROVIDER CONTACT NOTE (OTHER) - ASSESSMENT
A&Ox3. VSS. No s/s of bleed. pt on heparin patient specific.
A&O x3. VSS, no events on tele. On 3L NC. No c/o chest pain, SOB, palpitations or dizziness.
A&ox2-3. VSS. No c/o palpitations or chest pain. no s/s of distress. had h/o of bts wct. wife at bedside.
Pt AXOX4, denies cp or palpitations
Pt is A&Ox3, Asymptomatic w/ no c/o cp or sob. Patient stable
A&ox2. VSS. No s/s of bleed. Pt on heparin patient specific
/66  A&Ox2  No c/o SOB, chest pain, discomfort
AOX2. Pt denies palpitations, chest pain, dizziness. VSS except HR.
Patient is AOx2 oriented to self. VSS except for O2. B/L lungs diminished. Patient denies chest pain, palpitations, however is complaining of shortness of breath.
A&Ox3. VSS. No s/s of bleed. pt on heparin patient specific.
patient 85% O2 saturation on 2L, bumped patient up to 6L, O2 still not coming up, patient placed on nonrebreather with O2 coming up to mid 90's  Patient placed back on 6L NC, O2 then dropped again around 30 minutes later
PT AOx1 lethargic not sustaining awakenings. pt not answering questions or following commands. reply's to name then closes eyes again. VSS
Pt AAOx2. VSS on 3 L NC, except Low BP. Pt SBP runs ; denies CP, SOB, no acute events on tele; Pt felt dizzy after moving out of bed to chair.
Patient is AOx2 VSS. BP is 90/64. No complaint of chest pain, shortness of breath or palpitations. No headache or dizziness noted on assessment.

## 2023-11-21 NOTE — PROGRESS NOTE ADULT - SUBJECTIVE AND OBJECTIVE BOX
ADVANCED HEART FAILURE & TRANSPLANT  - PROGRESS NOTE  *To reach the NS2 Team from 8am to 5pm (MON-FRI), please call [phone].   _______________________________________________________________________________________________________    Subjective:  - On higher Oxygen requirements now on BiPAP  - Persistent SBP 80-90's and reduced UOP      Medications:  albuterol/ipratropium for Nebulization 3 milliLiter(s) Nebulizer every 6 hours PRN  aMIOdarone Tablet 200 milliGRAM(s) Oral daily  aMIOdarone Tablet   Oral   aspirin enteric coated 81 milliGRAM(s) Oral daily  atorvastatin 80 milliGRAM(s) Oral at bedtime  chlorhexidine 2% Cloths 1 Application(s) Topical <User Schedule>  furosemide Infusion 10 mG/Hr IV Continuous <Continuous>  hydrALAZINE 50 milliGRAM(s) Oral every 8 hours  insulin glargine Injectable (LANTUS) 15 Unit(s) SubCutaneous at bedtime  insulin lispro (ADMELOG) corrective regimen sliding scale   SubCutaneous three times a day before meals  insulin lispro (ADMELOG) corrective regimen sliding scale   SubCutaneous at bedtime  insulin lispro Injectable (ADMELOG) 6 Unit(s) SubCutaneous three times a day before meals  iron sucrose Injectable 200 milliGRAM(s) IV Push every 24 hours  isosorbide   dinitrate Tablet (ISORDIL) 20 milliGRAM(s) Oral three times a day  metoprolol succinate ER 25 milliGRAM(s) Oral daily  milrinone Infusion 0.125 MICROgram(s)/kG/Min IV Continuous <Continuous>  pantoprazole    Tablet 40 milliGRAM(s) Oral before breakfast  polyethylene glycol 3350 17 Gram(s) Oral daily PRN  senna 2 Tablet(s) Oral at bedtime  tamsulosin 0.4 milliGRAM(s) Oral at bedtime      Physical Exam:    Vitals:  Vital Signs Last 24 Hours  T(C): 36.7 (11-21-23 @ 11:00), Max: 37.1 (23 @ 16:48)  HR: 81 (23 @ 13:00) (78 - 98)  BP: 81/59 (23 @ 11:00) (81/59 - 96/62)  RR: 22 (23 @ 13:00) (19 - 36)  SpO2: 97% (23 @ 13:00) (78% - 99%)    Weight in k.5 ( @ 07:36)    I&O's Summary    2023 07:  -  2023 07:00  --------------------------------------------------------  IN: 830 mL / OUT: 850 mL / NET: -20 mL    2023 07:01  -  2023 13:49  --------------------------------------------------------  IN: 10 mL / OUT: 165 mL / NET: -155 mL    Tele:   80-90's    General: No distress. Comfortable.  HEENT: EOM intact.  Neck: JVP elevated  Chest: Clear to auscultation bilaterally  CV: Normal S1 and S2. No murmurs, rub, or gallops. Radial pulses normal, warm peripherally  Abdomen: Soft, non-distended, non-tender  Skin: No rashes or skin breakdown  Extremities: Trace b/l LE edema  Neurology: Alert and oriented times three. Sensation intact  Psych: Affect normal    Labs:                        11.1   12.29 )-----------( 280      ( 2023 06:29 )             37.0         134<L>  |  99  |  84<H>  ----------------------------<  150<H>  4.8   |  19<L>  |  4.15<H>    Ca    8.3<L>      2023 06:29  Phos  5.4     -  Mg     2.5         TPro  6.1  /  Alb  3.3  /  TBili  0.3  /  DBili  x   /  AST  40  /  ALT  88<H>  /  AlkPhos  135<H>    PT/INR - ( 2023 06:29 )   PT: 32.8 sec;   INR: 3.23 ratio

## 2023-11-21 NOTE — PROGRESS NOTE ADULT - PROBLEM SELECTOR PROBLEM 8
Severe mitral regurgitation
Prophylactic measure
T2DM (type 2 diabetes mellitus)
Ventricular tachycardia
Severe mitral regurgitation
T2DM (type 2 diabetes mellitus)
T2DM (type 2 diabetes mellitus)
Anemia
Anemia
BPH without urinary obstruction
BPH without urinary obstruction
T2DM (type 2 diabetes mellitus)
Ventricular tachycardia
HLD (hyperlipidemia)
LAURA (acute kidney injury)
Severe mitral regurgitation
Severe mitral regurgitation
T2DM (type 2 diabetes mellitus)
Anemia
BPH without urinary obstruction
T2DM (type 2 diabetes mellitus)
LAURA (acute kidney injury)
T2DM (type 2 diabetes mellitus)

## 2023-11-21 NOTE — PROVIDER CONTACT NOTE (OTHER) - RECOMMENDATIONS
EKG? RRT?
Made NP aware.
Made PA aware
NP made aware
Notify provider to come to bedside
Night ACP made aware
Notify provider. and educate patient.
Notify provider. Pt placed back in bed. VS rechecked.
Notify the provider.
Night ACP made aware
made np aware.
NP made aware
PA made aware
PA made aware

## 2023-11-21 NOTE — PROGRESS NOTE ADULT - PROBLEM SELECTOR PLAN 2
- 95% discrete proximal LAD disease and sequential multifocal disease with good distal target, 80-90% proximal LCx disease just prior to marginals, severe multifocal RCA. No viability on MRI  - There is greater than 50% thickness subendocardial LGE within the mid anteroseptum, anterior and anterolateral segments and apical segments  - Spoke to IC team, pt not a good candidate for palliative PCI as significant LGE and pt would need ECMO for support which is very high risk in pt with recent strokes / LV thrombus / severe cerebrovascular disease  - On ASA and Statin

## 2023-11-21 NOTE — PROCEDURE NOTE - NSPROCNAME_GEN_A_CORE
ICD Interrogation Note
General
Arterial Puncture/Cannulation
ICD Interrogation Note
Central Line Insertion
Arterial Puncture/Cannulation

## 2023-11-21 NOTE — PROGRESS NOTE ADULT - PROBLEM SELECTOR PLAN 4
- Baseline Cr 1-1.3  - Recently worsening, today 3.13  - Diuresis as above  - May also be component of BALBINA, as he received contrast for CTA head on 11/6
- Needs reassessment after optimization of volume status and GDMT
- Needs reassessment after revascularization and GDMT
A1c 10/14 8.4%    On Lantus 30u qhs + lispro 10u premeal + ISS    FS have been borderline- 74 this am- reduced to 24-8/8/8- monitor.
A1c 6.9  Continue Lantus 30, premeal Admelog 10
- Baseline Cr 1-1.3  - Recently worsening, today 3.1  - Diuresis as above  - May also be component of BALBINA, as he received contrast for CT head on 11/6
- Needs reassessment after optimization of volume status and GDMT
- Needs reassessment after optimization of volume status and GDMT.
s/p LHC-> 95% discrete proximal LAD disease and sequential multifocal disease with good distal target, 80-90% proximal LCx disease just prior to marginal, severe multifocal RCA. Limited viability on MRI  - EF <20%  - Not a candidate for CABG, likely not a candidate for PCI as per EP note  - Plans- c/w ASA, statin, Metoprolol, IV heparin gtt
L Parietal and R PICA infarct with acute encephalopathy secondary to cardio embolic stroke  - MR head 10/30 w/ R PICA infarct and L parietal infarct, likely due to embolic shower, per neuro. Very mild hemorrhagic transformation in R PICA distribution   - MR neck showing occluded R-sided intracranial vertebral artery of unknown timeframe and mild focal stenosis of the R supraclinoid intracranial internal   carotid artery secondary to atherosclerosis  - neurology following  - B12, TSH, RPR wnl  - neuro checks
Likely due to acute systolic HF, Treated for PNA at [facility]  - appreciated Pulmonary plans- O2, diuretic, incentive spirometer  - no plans for thoracentesis  - outpatient f/u for 6mm Pulmonary nodule
s/p LHC-> 95% discrete proximal LAD disease and sequential multifocal disease with good distal target, 80-90% proximal LCx disease just prior to marginal, severe multifocal RCA. Limited viability on MRI  - EF <20%  - Not a candidate for CABG, likely not a candidate for PCI as per EP note  - Plans- c/w ASA, statin, Metoprolol, IV heparin gtt
A1c 10/14 8.4%    On Lantus 30u qhs + lispro 10u premeal + ISS    FS have been borderline- 74 this am- reduced to 24-8/8/8- monitor
L Parietal and R PICA infarct with acute encephalopathy secondary to cardio embolic stroke  - MR head 10/30 w/ R PICA infarct and L parietal infarct, likely due to embolic shower, per neuro. Very mild hemorrhagic transformation in R PICA distribution   - MR neck showing occluded R-sided intracranial vertebral artery of unknown timeframe and mild focal stenosis of the R supraclinoid intracranial internal   carotid artery secondary to atherosclerosis  - B12, TSH, RPR wnl  - RRT/code stroke called 11/6 in setting of hypotension > CTH stable. old R cerebellar infarct. CTA with severe L VA stenosis ; R VA occlusion   - Appreciate neurology team recs.   - neuro checks. strict fall and aspiration precautions.
[facility]. [facility] which [facility] to 1.6. Now downtrending  - Monitor I/Os, trend SCr, BMP  - Avoid nephrotoxic medications and renally dose meds
Likely due to acute systolic HF, Treated for PNA at [facility]  - wean down oxygen as tolerated  - no plans for thoracentesis  - outpatient f/u for 6mm Pulmonary nodule
s/p LHC-> 95% discrete proximal LAD disease and sequential multifocal disease with good distal target, 80-90% proximal LCx disease just prior to marginal, severe multifocal RCA. Limited viability on MRI  - EF <20%  - Not a candidate for CABG, likely not a candidate for PCI as per EP note  - Plans- c/w ASA, statin, Metoprolol, IV heparin gtt
- Seen on 10/26 limited TTE  - Cont hep gtt   - No longer a candidate for impella assisted PCI.
- Seen on 10/26 limited TTE  - Cont hep gtt   - No longer a candidate for impella assisted PCI.
[facility] 10/16 with 3v disease: 95% pLAD, 80% mid and distal LCx, 90% mid and distal RCA  - Per CT surg, pt is not a good candidate for CABG given lack of viability in the LAD territory  - F/u cardiology recs, re: high risk PCI vs further intervention; EP recommended no indication for upgrade at this time.
- Needs reassessment after revascularization and GDMT
- Needs reassessment after revascularization and GDMT
s/p LHC-> 95% discrete proximal LAD disease and sequential multifocal disease with good distal target, 80-90% proximal LCx disease just prior to marginal, severe multifocal RCA. Limited viability on MRI  - EF <20%  - Not a candidate for CABG, likely not a candidate for PCI as per EP note  - Plans- c/w ASA, statin, Metoprolol, IV heparin gtt
A1c 6.9  Continue Lantus 30, premeal Admelog 10
A1c 6.9  Continue Lantus 30, premeal Admelog 10
- Baseline Cr 1-1.3  - Recently worsening, today 3.1  - Diuresis as above  - May also be component of BALBINA, as he received contrast for CT head on 11/6
A1c 6.9  Continue Lantus 30, premeal Admelog 10
Last Cr 1.92  Will continue to monitor with diuresis(now on PO).
Echo shows LV thrombus  - c/w IV heparin gtt, bridging with coumadin
s/p LHC-> 95% discrete proximal LAD disease and sequential multifocal disease with good distal target, 80-90% proximal LCx disease just prior to marginal, severe multifocal RCA. Limited viability on MRI  - EF <20%  - Not a candidate for CABG, likely not a candidate for PCI as per EP note  - Plans- c/w ASA, statin, Metoprolol, IV heparin gtt
- Needs reassessment after revascularization and GDMT
- Needs reassessment after revascularization and GDMT
[facility] to [facility].6. Now downtrending  - Monitor I/Os, trend SCr, BMP  - Avoid nephrotoxic medications and renally dose meds
- Needs reassessment after optimization of volume status and GDMT
- Seen on 10/26 limited TTE  - Cont hep gtt   - No longer a candidate for impella assisted PCI.
L Parietal and R PICA infarct with acute encephalopathy secondary to cardio embolic stroke  - MR head 10/30 w/ R PICA infarct and L parietal infarct, likely due to embolic shower, per neuro. Very mild hemorrhagic transformation in R PICA distribution   - MR neck showing occluded R-sided intracranial vertebral artery of unknown timeframe and mild focal stenosis of the R supraclinoid intracranial internal   carotid artery secondary to atherosclerosis  - neurology following  - B12, TSH, RPR wnl  - neuro checks
s/p LHC-> 95% discrete proximal LAD disease and sequential multifocal disease with good distal target, 80-90% proximal LCx disease just prior to marginal, severe multifocal RCA. Limited viability on MRI  - EF <20%  - Not a candidate for CABG, likely not a candidate for PCI as per EP note  - Plans- c/w ASA, statin, Metoprolol, IV heparin gtt
- Baseline Cr 1-1.3  - Cr remains elevated, today 3.2  - Adjusting vasodilators as above to allow for increased renal perfusion  - Nephrology following, recommending he stay off Farxiga
A1c 6.9  Continue Lantus 30, premeal Admelog 10
Patient with CTH showing R cerebellar infarct, age indeterminate, likely chronic   cognitive issues, not understanding why he is in hospital r/o embolic shower. 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
- Baseline Cr 1-1.3  - Cr remains elevated, today 3.2  - Adjusting vasodilators as above to allow for increased renal perfusion  - Nephrology following, recommending he stay off Farxiga
s/p LHC-> 95% discrete proximal LAD disease and sequential multifocal disease with good distal target, 80-90% proximal LCx disease just prior to marginal, severe multifocal RCA. Limited viability on MRI  - EF <20%  - Not a candidate for CABG, likely not a candidate for PCI as per EP note  - Plans- c/w ASA, statin, Metoprolol, IV heparin gtt
- Baseline Cr 1-1.3  - Cr remains elevated, today 3.2  - Adjusting vasodilators as above to allow for increased renal perfusion  - Nephrology following, recommending he stay off Farxiga
s/p LHC-> 95% discrete proximal LAD disease and sequential multifocal disease with good distal target, 80-90% proximal LCx disease just prior to marginal, severe multifocal RCA. Limited viability on MRI  - EF <20%  - Not a candidate for CABG, likely not a candidate for PCI as per EP note  - Plans- c/w ASA, statin, Metoprolol, IV heparin gtt
Echo shows LV thrombus  - c/w IV heparin gtt, bridging with coumadin
Last Cr 1.92  Will continue to monitor with diuresis(now on PO).
s/p LHC-> 95% discrete proximal LAD disease and sequential multifocal disease with good distal target, 80-90% proximal LCx disease just prior to marginal, severe multifocal RCA. Limited viability on MRI  - EF <20%  - Not a candidate for CABG, likely not a candidate for PCI as per EP note  - Plans- c/w ASA, statin, Metoprolol, IV heparin gtt
Last Cr 1.92  Will continue to monitor with diuresis(now on PO)
L Parietal and R PICA infarct with acute encephalopathy secondary to cardio embolic stroke  - MR head 10/30 w/ R PICA infarct and L parietal infarct, likely due to embolic shower, per neuro. Very mild hemorrhagic transformation in R PICA distribution   - MR neck showing occluded R-sided intracranial vertebral artery of unknown timeframe and mild focal stenosis of the R supraclinoid intracranial internal   carotid artery secondary to atherosclerosis  - neurology following  - B12, TSH, RPR wnl  - neuro checks

## 2023-11-21 NOTE — PROGRESS NOTE ADULT - PROVIDER SPECIALTY LIST ADULT
BLADIMIR
Cardiology
Electrophysiology
Electrophysiology
Heart Failure
Heart Failure
Intervent Cardiology
Neurology
Neurosurgery
Psychology
Pulmonology
Pulmonology
Cardiology
Electrophysiology
Electrophysiology
Heart Failure
Hospitalist
Nephrology
Neurology
Pulmonology
BLADIMIR
CT Surgery
Critical Care
Critical Care
Electrophysiology
Electrophysiology
Heart Failure
Heart Failure
Hospitalist
Hospitalist
Internal Medicine
Nephrology
Nephrology
Neurology
BLADIMIR
BLADIMIR
Cardiology
Electrophysiology
Electrophysiology
Nephrology
Neurology
Neurosurgery
Neurosurgery
Heart Failure
Heart Failure
Pulmonology
BLADIMIR
BLADIMIR
Internal Medicine
Neurology
Neurology
BLADIMIR
Heart Failure
Hospitalist
Pulmonology
BLADIMIR
Heart Failure
Internal Medicine
Heart Failure
Heart Failure
Internal Medicine
Heart Failure
Heart Failure
Hospitalist
Heart Failure
Hospitalist
Internal Medicine
Heart Failure
Internal Medicine
Heart Failure
Hospitalist
Hospitalist
Heart Failure
Heart Failure
Hospitalist
Heart Failure
Internal Medicine
Heart Failure
Hospitalist
Internal Medicine
Internal Medicine
Hospitalist
Internal Medicine
Internal Medicine
Hospitalist
Hospitalist
Heart Failure
Internal Medicine

## 2023-11-21 NOTE — PROGRESS NOTE ADULT - SUBJECTIVE AND OBJECTIVE BOX
ADVANCED HEART FAILURE & TRANSPLANT  - PROGRESS NOTE  *To reach the NS2 Team from 8am to 5pm (MON-FRI), please call [phone].   _______________________________________________________________________________________________________    Subjective:    Medications:  albuterol/ipratropium for Nebulization 3 milliLiter(s) Nebulizer every 6 hours PRN  aMIOdarone    Tablet   Oral   aMIOdarone    Tablet 200 milliGRAM(s) Oral daily  aspirin enteric coated 81 milliGRAM(s) Oral daily  atorvastatin 80 milliGRAM(s) Oral at bedtime  chlorhexidine 2% Cloths 1 Application(s) Topical <User Schedule>  furosemide Infusion 10 mG/Hr IV Continuous <Continuous>  hydrALAZINE 50 milliGRAM(s) Oral every 8 hours  insulin glargine Injectable (LANTUS) 15 Unit(s) SubCutaneous at bedtime  insulin lispro (ADMELOG) corrective regimen sliding scale   SubCutaneous at bedtime  insulin lispro (ADMELOG) corrective regimen sliding scale   SubCutaneous three times a day before meals  insulin lispro Injectable (ADMELOG) 6 Unit(s) SubCutaneous three times a day before meals  iron sucrose Injectable 200 milliGRAM(s) IV Push every 24 hours  isosorbide   dinitrate Tablet (ISORDIL) 20 milliGRAM(s) Oral three times a day  metoprolol succinate ER 25 milliGRAM(s) Oral daily  milrinone Infusion 0.125 MICROgram(s)/kG/Min IV Continuous <Continuous>  pantoprazole    Tablet 40 milliGRAM(s) Oral before breakfast  polyethylene glycol 3350 17 Gram(s) Oral daily PRN  senna 2 Tablet(s) Oral at bedtime  tamsulosin 0.4 milliGRAM(s) Oral at bedtime      Physical Exam:    Vitals:  Vital Signs Last 24 Hours  T(C): 36.7 (23 @ 11:00), Max: 37.1 (11-20-23 @ 16:48)  HR: 81 (23 @ 13:00) (78 - 98)  BP: 81/59 (23 @ 11:00) (81/59 - 96/62)  RR: 22 (23 @ 13:00) (19 - 36)  SpO2: 97% (23 @ 13:00) (78% - 99%)    Weight in k.5 ( @ 07:36)    I&O's Summary    2023 07:01  -  2023 07:00  --------------------------------------------------------  IN: 830 mL / OUT: 850 mL / NET: -20 mL    2023 07:01  -  2023 13:38  --------------------------------------------------------  IN: 10 mL / OUT: 165 mL / NET: -155 mL        Tele:    General: No distress. Comfortable.  HEENT: EOM intact.  Neck: Neck supple. JVP not elevated. No masses  Chest: Clear to auscultation bilaterally  CV: Normal S1 and S2. No murmurs, rub, or gallops. Radial pulses normal.  Abdomen: Soft, non-distended, non-tender  Skin: No rashes or skin breakdown  Extremities: No LE edema  Neurology: Alert and oriented times three. Sensation intact  Psych: Affect normal    Labs:                        11.1   12.29 )-----------( 280      ( 2023 06:29 )             37.0         134<L>  |  99  |  84<H>  ----------------------------<  150<H>  4.8   |  19<L>  |  4.15<H>    Ca    8.3<L>      2023 06:29  Phos  5.4       Mg     2.5         TPro  6.1  /  Alb  3.3  /  TBili  0.3  /  DBili  x   /  AST  40  /  ALT  88<H>  /  AlkPhos  135<H>      PT/INR - ( 2023 06:29 )   PT: 32.8 sec;   INR: 3.23 ratio

## 2023-11-21 NOTE — PROGRESS NOTE ADULT - NS MD NEURO CONDITIONS_HEART1
Acute
Acute
Chronic
Acute
Acute on Chronic
Acute
Acute on Chronic
Acute
Acute on Chronic
Acute

## 2023-11-21 NOTE — PROGRESS NOTE ADULT - ASSESSMENT
57 yo Male with prior Stroke with mild ?left sided deficits (improved), HTN, DM2, vertigo, HLD, Mobitz II s/pp Medtronic dual chamber ICD (12/21/22) initially presented to [facility] from home with complaints of dyspnea and chest pain and was admitted w/ acute hypoxic respiratory failure likely due to acute pulmonary edema due to acute HFrEF in setting of acute NSTEMI, LAURA and enterovirus infection. Pt with brief MICU stay at [facility] requiring bipap (no intubation), was treated for PNA with cefepime, and was found to have TTE done 9/26/23 showing EF 20% with severely decreased LVSF, multiple regional wall motion abnormalities, and elevated LV end diastolic pressure. Pt was transferred to [facility] for cardiac eval.   TTE EF < 20%  RHC 10/19: RA 10, RV 47/9/13, Wedge 29, PA 41/26/33, CO/CI 4.4/2.3, PA sat 57.3  EKG: sinus tachycardia, septal q-waves, left axis deviation   TTE 10/16/23: LV 5.5 cm, LVEF 20% with regional WMAs worse in the apex, LVOT VTI 8 cm, normal RV size/function, severe functional MR, small pericardial effusion, estimated RA pressure 8 mmHg   TTE 12/2022: normal LVEF   LHC: 95% discrete proximal LAD disease and sequential multifocal disease with good distal target, 80-90% proximal LCx disease just prior to marginals, severe multifocal RCA disease with good targets   A1c 8.4    CD 10/17 neg   NIHSS 1  CTH with age indeterminate R cerebellar infarct.  likely chronic   CTA with mod L MCA stenosis, R VA occlusion, severe L VA stenosis   MRi brain with eovlving acute/subacute R PICA infarct with mild edema and mild hemorrhagic transformation. 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  occluded R VA. severe L VA stenosis   CTH stable   RRT/code stroke on 11/6 in setting of SBP 80s/50s   CTH stable. old R cerebellar infarct. CTA with severe L VA stenosis ; R VA occlusion   11/8 RRT transferred to CCU. no neuro changes   MR NOVA: Old right medial cerebellar infarct with hemosiderin staining. Small vessel white matter ischemic changes. Left occipital white matter infarct with diffusion restriction. Scattered foci of hemosiderin deposition in the basal ganglia and left parietal region.Occluded nondominant right vertebral artery. There is stenosis V4 segment dominant left vertebral artery. Significant left M1 stenosis using noninvasive flow MR angiography.  s/p cath        Impression:   R cerebellar/PICA infarct as well as L parietal embolic infarct, likely cardioembolic   worsening symptoms in setting of hypotension,m now improved       - neurosx plans for cerebral angio when Cr allows   - -  patient will be high risk from neuro standpoint for cardiac intervention however benefits at this point seem to outweigh risks.  would consider PCI.  triple therapy will be needed    - keep SBP > 100; becomes symptomatic when BP drops   - c/o SOB ; f/u cardio and pulmo   - f/u heart failure team ; workup now deferred given cognitive decline   - can consider routine EEG but low yield   - CTS eval for CABG given 3 vessel disease vs high risk PCI --> not a CABG candidate ; possible PCI planning but would need "triple therapy"   - c/w asa and coumadin and statin therpay for secondary stroke prevention.   - no objection to heparin drip for low EF and LV thrombus  --> no objection to transition to coumadin ; hep to coumadin bridge ; coumadin per INR   - called for risk stratification for heart transplant eval,  low-mod risk from stroke standpoint and no objection   - b12, TSH, RPR for reversible causes of dementia   - telemetry  - PT/OT   - check FS, glucose control <180  - GI/DVT ppx   - Thank you for allowing me to participate in the care of this patient. Call with questions.         Abelardo Irving MD  Vascular Neurology  Office: [phone]

## 2023-11-21 NOTE — PROGRESS NOTE ADULT - PROBLEM SELECTOR PLAN 1
- Etiology: ischemic with LVEF <20%  - Will initiate empiric inotropes with Milrinone 0.125mcg/hr now; accepted by CICU awaiting transfer  - Currently on Lasix gtts @10mg/hr; will obtain bedside hemodynamics in the unit for further guidance  - Monitor daily standing weights. Will assess CardioMEMS intermittently;   - Continue HDZN to 50 mg TID and ISDN to 20 mg TID, hold for SBP <90  - Continue Toprol XL 25 mg QD  - Farxiga stopped by nephrology 11/14 for concern of ongoing LAURA   - Please engage PT to prevent deconditioning  - Advanced therapies: holding off on launching VAD/transplant eval given cognitive issues however will continue to reassess. Of note he has good support and no clear psychosocial red flags. ABO AB.

## 2023-11-21 NOTE — PROGRESS NOTE ADULT - PROBLEM SELECTOR PLAN 8
Continue Tamsulosin.
Scr 2.2, baseline 1.28 on Oct 15, 2023  - possibly cardiorenal, also had contrast studies  - Renal US performed, will follow up results
- 11/15 Ferritin 72 and TSAT 8%  - Currently on iron sucrose on 11/17-11/21
A1C 8.4%  - Has been on Lantus 15u qhs, Admelog 6u AC and SS  - CC diet  - FS are ok
Continue Tamsulosin
A1C 8.4%  - Has been on Lantus 15u qhs, Admelog 6u AC and SS  - CC diet
A1c 10/14 8.4%  - increased Lantus 30u qhs + lispro 12u premeal + ISS  - Monitor FS
- 11/15 Ferritin 72 and TSAT 8%  - Please start IV Venefor 200 x5 days
A1C 8.4%  - Has been on Lantus 15u qhs, Admelog 6u AC and SS  - CC diet  - FS are ok
Continue Statin.
A1C 8.4%  - Has been on Lantus 15u qhs, Admelog 6u AC and SS  - CC diet
Repeat TTE after optimization of volume status and GDMT.
Scr 2.2, baseline 1.28 on Oct 15, 2023  - possibly cardiorenal, also had contrast studies  - Renal US performed, will follow up results
- 11/15 Ferritin 72 and TSAT 8%  - Currently on iron sucrose on 11/17-11/21
On 11/8 for sustained MMVT on the floor, then readmitted to CCU. Known h/o Mobitz II s/p Medtronic dual chamber ICD (12/21/22)   - EP plans noted- on amiodarone load now to 10gm, then 200mg/d
Repeat TTE after optimization of volume status and GDMT.
DVT ppx: heparin subq  Diet: CC, DASH, Halal  Dispo: pending [facility]  Code: Full
A1C 8.4%  - Has been on Lantus 15u qhs, Admelog 6u AC and SS  - CC diet
On 11/8 for sustained MMVT on the floor, then readmitted to CCU. Known h/o Mobitz II s/p Medtronic dual chamber ICD (12/21/22)   - EP plans noted- on amiodarone load now to 10gm, then 200mg/d
Repeat TTE after optimization of volume status and GDMT.
A1C 8.4%  - Has been on Lantus 15u qhs, Admelog 6u AC and SS  - CC diet
A1C 8.4%  - Has been on Lantus 15u qhs, Admelog 6u AC and SS  - CC diet
Continue Tamsulosin
Repeat TTE after optimization of volume status and GDMT.

## 2023-11-21 NOTE — PROGRESS NOTE ADULT - PROBLEM SELECTOR PLAN 7
MRI brain with evolving acute/subacute R PICA infarct with mild edema and mild hemorrhagic transformation. also with acute subacute left parietal infarct also embolic. 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  occluded R VA. severe L VA stenosis   - appreciated Neurology plans  - Repeat CTH stable   - c/w ASA, statin, AC for LV thrombus
Scr 2.2, baseline 1.28 on Oct 15, 2023  - possibly cardiorenal, also had contrast studies  - Renal US
c/w Flomax
MRI brain with evolving acute/subacute R PICA infarct with mild edema and mild hemorrhagic transformation. also with acute subacute left parietal infarct also embolic. 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  occluded R VA. severe L VA stenosis   - appreciated Neurology plans  - Repeat CTH stable   - c/w ASA, statin, AC for LV thrombus
MRI brain with evolving acute/subacute R PICA infarct with mild edema and mild hemorrhagic transformation. also with acute subacute left parietal infarct also embolic. 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  occluded R VA. severe L VA stenosis   - appreciated Neurology plans  - Repeat CTH stable   - c/w ASA, statin, AC for LV thrombus
- Medical management
Continue Statin
- Medical management
- Medical management
Continue Statin
Possible reflex tachycardiac iso hypoxia +/- NSTEMI +/- ADHF.  Has ICD in place and notably with high burden of RV pacing  EP consulted for upgrade of device pending recs,
secondary to increased cardiac demand vs inotrope effect  - per EP, appears sinus tach, no indication for cardioversion/EMILEE  - s/p dig load, Continue Digoxin   - per EP, no need to upgrade device at this time  - monitor on tele
- Medical management
MRI brain with evolving acute/subacute R PICA infarct with mild edema and mild hemorrhagic transformation. also with acute subacute left parietal infarct also embolic. 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  occluded R VA. severe L VA stenosis   - appreciated Neurology plans  - Repeat CTH stable   - c/w ASA, statin, AC for LV thrombus
Continue Statin.
A1C 8.4%  - Has been on Lantus 15u qhs, Admelog 6u AC and SS  - CC diet
MRI brain with evolving acute/subacute R PICA infarct with mild edema and mild hemorrhagic transformation. also with acute subacute left parietal infarct also embolic. 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  occluded R VA. severe L VA stenosis   - appreciated Neurology plans  - Repeat CTH stable   - c/w ASA, statin, AC for LV thrombus
MRI brain with evolving acute/subacute R PICA infarct with mild edema and mild hemorrhagic transformation. also with acute subacute left parietal infarct also embolic. 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  occluded R VA. severe L VA stenosis   - appreciated Neurology plans  - Repeat CTH stable   - c/w ASA, statin, AC for LV thrombus
Repeat TTE after optimization of volume status and GDMT.
secondary to increased cardiac demand vs inotrope effect  - per EP, appears sinus tach, no indication for cardioversion/EMILEE  - s/p dig load, dig level 1.4, acceptable per HF   - per EP, no need to upgrade device at this time  - monitor on tele
DVT ppx: heparin subq  Diet: CC, DASH, Halal  Dispo: pending [facility]  Code: Full
MRI brain with evolving acute/subacute R PICA infarct with mild edema and mild hemorrhagic transformation. also with acute subacute left parietal infarct also embolic. 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  occluded R VA. severe L VA stenosis   - appreciated Neurology plans  - Repeat CTH stable   - c/w ASA, statin, AC for LV thrombus
Scr 2.2, baseline 1.28 on Oct 15, 2023  - possibly cardiorenal, also had contrast studies  - Renal US performed, will follow up results
- Medical management
A1C 8.4%  - Has been on Lantus 15u qhs, Admelog 6u AC and SS  - CC diet
MRI brain with evolving acute/subacute R PICA infarct with mild edema and mild hemorrhagic transformation. also with acute subacute left parietal infarct also embolic. 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  occluded R VA. severe L VA stenosis   - appreciated Neurology plans  - Repeat CTH stable   - c/w ASA, statin, AC for LV thrombus
secondary to increased cardiac demand vs inotrope effect  - per EP, appears sinus tach, no indication for cardioversion/EMILEE  - s/p dig load, dig level 1.4, acceptable per HF   - per EP, no need to upgrade device at this time  - monitor on tele
MRI brain with evolving acute/subacute R PICA infarct with mild edema and mild hemorrhagic transformation. also with acute subacute left parietal infarct also embolic. 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  occluded R VA. severe L VA stenosis   - appreciated Neurology plans  - Repeat CTH stable   - c/w ASA, statin, AC for LV thrombus
secondary to increased cardiac demand vs inotrope effect  - per EP, appears sinus tach, no indication for cardioversion/EMILEE  - s/p dig load, dig level 1.4, acceptable per HF   - per EP, no need to upgrade device at this time  - monitor on tele

## 2023-11-21 NOTE — DISCHARGE NOTE FOR THE EXPIRED PATIENT - HOSPITAL COURSE
59 y/o male pmhx CVA with mild left sided deficits, HTN, DM2, vertigo, HLD, Mobitz II s/pp Medtronic dual chamber ICD (12/21/22) initially presented to [facility] from home with complaints of dyspnea and chest pain and was admitted w/ acute hypoxic respiratory failure likely due to acute pulmonary edema due to acute HFrEF in setting of acute NSTEMI, LAURA and enterovirus infection. Pt with brief MICU stay at [facility] requiring bipap (no intubation), was treated for PNA with cefepime, and was found to have TTE done 9/26/23 showing EF 20% with severely decreased LVSF, multiple regional wall motion abnormalities, and elevated LV end diastolic pressure. Pt was transferred to [facility] for cardiac cath evaluation.       On arrival to [facility], patient without any acute complaints, complaining of intermittent palpitations for the last 2 days.  S/p LHC on 10/16 w/ 3v disease and RHC on 10/19 for hemodynamic assessment. Briefly on empiric dobutamine. Deemed to be too high risk for CABG and has been stable with hydralazine for afterload reduction and diuresis with bumex. Heart Failure team following and currently holding off on advanced therapy discussion. EP recommended no indication for upgrade at this time. No plan for VAD/transplant eval at this time.    On limited TTE 10/26, EF <20 %, LV apical thrombus identified. Started on milrinone on 10/28 and weaned off 11/1. Pt with stable numbers after optimizing afterload reduction with isordil and hydral, lactate downtrended. Pt receiving intermittent bumex on top of standing PO with good UO although not always reliable given incontinence counts. Pt's tachycardia deemed to be sinus likely iso cardiogenic shock. Deemed not to be a CABG candidate by CTS due to limited viability in LAD territory. Plan for AT eval vs. high risk PCI, although may want pt's mental status optimized before intervention.    Pt with mental status A&Ox2-3 while in the CCU (knows name, that he is in hospital, sometimes knows month, does not know year, knows president). He had MR head 10/30 showing R PICA infarct and L parietal infarct, likely due to cardioembolic shower, per neuro. Very mild hemorrhagic transformation in R PICA distribution, repeat CT head confirmed no hemorrhage. Neuro recommended to c/w heparin gtt and repeat imaging if change in neuro exam (currently normal except for A&Ox2-3 and slow finger to nose). Pt w/ LAURA since hospitalized and variable elevated daily creatinine.       59 y/o male pmhx CVA with mild left sided deficits, HTN, DM2, vertigo, HLD, Mobitz II s/pp Medtronic dual chamber ICD (22) initially presented to [facility] from home with complaints of dyspnea and chest pain and was admitted w/ acute hypoxic respiratory failure likely due to acute pulmonary edema due to acute HFrEF in setting of acute NSTEMI, LAURA and enterovirus infection. Pt with brief MICU stay at [facility] requiring bipap (no intubation), was treated for PNA with cefepime, and was found to have TTE done 23 showing EF 20% with severely decreased LVSF, multiple regional wall motion abnormalities, and elevated LV end diastolic pressure. Pt was transferred to [facility] for cardiac cath evaluation.       On arrival to [facility], patient without any acute complaints, complaining of intermittent palpitations for the last 2 days.  S/p LHC on 10/16 w/ 3v disease and RHC on 10/19 for hemodynamic assessment. Briefly on empiric dobutamine. Deemed to be too high risk for CABG and has been stable with hydralazine for afterload reduction and diuresis with bumex. Heart Failure team following and currently holding off on advanced therapy discussion. EP recommended no indication for upgrade at this time. No plan for VAD/transplant eval at this time.    On limited TTE 10/26, EF <20 %, LV apical thrombus identified. Started on milrinone on 10/28 and weaned off . Pt with stable numbers after optimizing afterload reduction with isordil and hydral, lactate downtrended. Pt receiving intermittent bumex on top of standing PO with good UO although not always reliable given incontinence counts. Pt's tachycardia deemed to be sinus likely iso cardiogenic shock. Deemed not to be a CABG candidate by CTS due to limited viability in LAD territory. Plan for AT eval vs. high risk PCI, although may want pt's mental status optimized before intervention.    Pt with mental status A&Ox2-3 while in the CCU (knows name, that he is in hospital, sometimes knows month, does not know year, knows president). He had MR head 10/30 showing R PICA infarct and L parietal infarct, likely due to cardioembolic shower, per neuro. Very mild hemorrhagic transformation in R PICA distribution, repeat CT head confirmed no hemorrhage. Neuro recommended to c/w heparin gtt and repeat imaging if change in neuro exam (currently normal except for A&Ox2-3 and slow finger to nose). Pt w/ LAURA since hospitalized and variable elevated daily creatinine.    RRT called on  for asymptomatic sustained VT in 160s with pulse. No intervention given and self-terminated. Transferred back to CICU, likely in setting of hypoglycemia. Continued PO amio load and transferred to floors after multiple days of no events on tele.     After downgrade, while on the floor became hypoxic with increased work of breathing, was escalated from NC to HFNC to NIPPV on . Chest xray shows persistent bilateral pulmonary edema for which he was recently started on IV furosemide continuous infusion. Per discussions with family and CICU attending, patient made comfort measures only at this time. Patient  at 22:29, wife notified by CICU team.            57 y/o male pmhx CVA with mild left sided deficits, HTN, DM2, vertigo, HLD, Mobitz II s/pp Medtronic dual chamber ICD (22) [facility] with complaints of dyspnea and chest pain and was admitted w/ acute hypoxic respiratory failure likely due to acute pulmonary edema due to acute HFrEF in setting of acute NSTEMI, LAURA and enterovirus infection. Pt with [facility] stay at [facility] requiring bipap (no intubation), was treated for PNA with cefepime, and was found to have TTE done 23 showing EF 20% with severely decreased LVSF, multiple regional wall motion abnormalities, and elevated LV end diastolic pressure. Pt was transferred to [facility] for cardiac cath evaluation.       On arrival to [facility], patient without any acute complaints, complaining of intermittent palpitations for the last 2 days.  S/p LHC on 10/16 w/ 3v disease and RHC on 10/19 for hemodynamic assessment. Briefly on empiric dobutamine. Deemed to be too high risk for CABG and has been stable with hydralazine for afterload reduction and diuresis with bumex. Heart Failure team following and currently holding off on advanced therapy discussion. EP recommended no indication for upgrade at this time. No plan for VAD/transplant eval at this time.    On limited TTE 10/26, EF <20 %, LV apical thrombus identified. Started on milrinone on 10/28 and weaned off . Pt with stable numbers after optimizing afterload reduction with isordil and hydral, lactate downtrended. Pt receiving intermittent bumex on top of standing PO with good UO although not always reliable given incontinence counts. Pt's tachycardia deemed to be sinus likely iso cardiogenic shock. Deemed not to be a CABG candidate by CTS due to limited viability in LAD territory. Plan for AT eval vs. high risk PCI, although may want pt's mental status optimized before intervention.    Pt with mental status A&Ox2-3 while in the CCU (knows name, that he is in hospital, sometimes knows month, does not know year, knows president). He had MR head 10/30 showing R PICA infarct and L parietal infarct, likely due to cardioembolic shower, per neuro. Very mild hemorrhagic transformation in R PICA distribution, repeat CT head confirmed no hemorrhage. Neuro recommended to c/w heparin gtt and repeat imaging if change in neuro exam (currently normal except for A&Ox2-3 and slow finger to nose). Pt w/ LAURA since hospitalized and variable elevated daily creatinine.    RRT called on  for asymptomatic sustained VT in 160s with pulse. No intervention given and self-terminated. Transferred back to CICU, likely in setting of hypoglycemia. Continued PO amio load and transferred to floors after multiple days of no events on tele.     After downgrade, while on the floor became hypoxic with increased work of breathing, was escalated from NC to HFNC to NIPPV on . Chest xray shows persistent bilateral pulmonary edema for which he was recently started on IV furosemide continuous infusion. Per discussions with family and CICU attending, patient made comfort measures only at this time. Patient  at 22:29, wife notified by CICU team.

## 2023-11-21 NOTE — PROGRESS NOTE ADULT - SUBJECTIVE AND OBJECTIVE BOX
[facility]   [phone]  RENAL FOLLOW UP NOTE  --------------------------------------------------------------------------------  Chief Complaint:    24 hour events/subjective:    patient seen and examined this am, on 6tower  c/o sob  on HFNC when seen    PAST HISTORY  --------------------------------------------------------------------------------  No significant changes to PMH, PSH, FHx, SHx, unless otherwise noted    ALLERGIES & MEDICATIONS  --------------------------------------------------------------------------------  Allergies    No Known Allergies    Intolerances      Standing Inpatient Medications  aMIOdarone    Tablet   Oral   aMIOdarone    Tablet 200 milliGRAM(s) Oral daily  aspirin enteric coated 81 milliGRAM(s) Oral daily  atorvastatin 80 milliGRAM(s) Oral at bedtime  chlorhexidine 2% Cloths 1 Application(s) Topical <User Schedule>  furosemide Infusion 10 mG/Hr IV Continuous <Continuous>  hydrALAZINE 50 milliGRAM(s) Oral every 8 hours  insulin glargine Injectable (LANTUS) 15 Unit(s) SubCutaneous at bedtime  insulin lispro (ADMELOG) corrective regimen sliding scale   SubCutaneous three times a day before meals  insulin lispro (ADMELOG) corrective regimen sliding scale   SubCutaneous at bedtime  insulin lispro Injectable (ADMELOG) 6 Unit(s) SubCutaneous three times a day before meals  iron sucrose Injectable 200 milliGRAM(s) IV Push every 24 hours  isosorbide   dinitrate Tablet (ISORDIL) 20 milliGRAM(s) Oral three times a day  metoprolol succinate ER 25 milliGRAM(s) Oral daily  milrinone Infusion 0.125 MICROgram(s)/kG/Min IV Continuous <Continuous>  pantoprazole    Tablet 40 milliGRAM(s) Oral before breakfast  senna 2 Tablet(s) Oral at bedtime  tamsulosin 0.4 milliGRAM(s) Oral at bedtime    PRN Inpatient Medications  albuterol/ipratropium for Nebulization 3 milliLiter(s) Nebulizer every 6 hours PRN  polyethylene glycol 3350 17 Gram(s) Oral daily PRN      REVIEW OF SYSTEMS  --------------------------------------------------------------------------------    Gen: denies fevers/chills,  CVS: denies chest pain/palpitations  Resp: denies SOB/Cough  GI: Denies N/V/Abd pain  : Denies dysuria    VITALS/PHYSICAL EXAM  --------------------------------------------------------------------------------  T(C): 36.7 (11-21-23 @ 11:00), Max: 37.1 (11-20-23 @ 16:48)  HR: 84 (11-21-23 @ 11:06) (78 - 98)  BP: 81/59 (11-21-23 @ 11:00) (81/59 - 96/62)  RR: 36 (11-21-23 @ 11:00) (19 - 36)  SpO2: 95% (11-21-23 @ 11:06) (78% - 99%)  Wt(kg): --        11-20-23 @ 07:01  -  11-21-23 @ 07:00  --------------------------------------------------------  IN: 830 mL / OUT: 850 mL / NET: -20 mL    11-21-23 @ 07:01  -  11-21-23 @ 12:21  --------------------------------------------------------  IN: 10 mL / OUT: 0 mL / NET: 10 mL      Physical Exam:  	  	Gen: ill appearing male, on HFNC, increased wob   	Pulm: decrease bs, +coarse bs.  	CV: +JVD. RRR, S1S2; no rub  	Abd: +BS, soft, nontender/softly distended  	: +penny  	UE: Warm, no cyanosis  no clubbing,  no edema;  	LE: Warm, no cyanosis  no clubbing, B/L 1+ ankle edema    LABS/STUDIES  --------------------------------------------------------------------------------              11.1   12.29 >-----------<  280      [11-21-23 @ 06:29]              37.0     134  |  99  |  84  ----------------------------<  150      [11-21-23 @ 06:29]  4.8   |  19  |  4.15        Ca     8.3     [11-21-23 @ 06:29]      Mg     2.5     [11-21-23 @ 06:29]      Phos  5.4     [11-19-23 @ 22:23]    TPro  6.1  /  Alb  3.3  /  TBili  0.3  /  DBili  x   /  AST  40  /  ALT  88  /  AlkPhos  135  [11-19-23 @ 22:23]    PT/INR: PT 32.8 , INR 3.23       [11-21-23 @ 06:29]      Creatinine Trend:  SCr 4.15 [11-21 @ 06:29]  SCr 3.76 [11-20 @ 06:50]  SCr 3.59 [11-19 @ 22:23]  SCr 3.08 [11-19 @ 06:40]  SCr 3.30 [11-18 @ 05:30]             Iron 22, TIBC 255, %sat 8      [11-15-23 @ 06:39]  Ferritin 72      [11-15-23 @ 06:39]  TSH 0.34      [11-10-23 @ 06:32]  Lipid: chol 167, TG 80, HDL 52, LDL --      [10-17-23 @ 08:16]    Syphilis Screen (Treponema Pallidum Ab) Negative      [10-29-23 @ 00:03]

## 2023-11-21 NOTE — PROGRESS NOTE ADULT - PROBLEM SELECTOR PLAN 1
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 Status post [facility]-> 95% discrete proximal LAD disease and sequential multifocal disease with good distal target, 80-90% proximal LCx disease, severe multifocal RCA. Limited viability on MRI. Echocardiogram with LVEF <20%, rWMA & LV thrombus  - HF team & EP card f/u plans appreciated, continue with GDMT (Metoprolol ER 25mg/d, ARB/ARNI- none due to LAURA, No MRA, eventual SGLT2i)  - decreased Hydral 50mg tid and ISDN 20mg TID  - Inotropes: Off milrinone since 11/1  - Advanced therapies- holding off on launching VAD/transplant eval given cognitive issues   - No longer a PCI candidate due to contraindications for ECMO, which likely would be required as per EP note. Will reassess following completion of amiodarone load.    - daily weight and I/O  - pt with poor urine output on lasix drip. will need transferred to CCU for inotropic supported diuresis

## 2023-11-21 NOTE — PROGRESS NOTE ADULT - PROBLEM SELECTOR PROBLEM 2
3-vessel CAD
Acute HFrEF (heart failure with reduced ejection fraction)
Acute HFrEF (heart failure with reduced ejection fraction)
NSTEMI (non-ST elevation myocardial infarction)
3-vessel CAD
NSTEMI (non-ST elevation myocardial infarction)
3-vessel CAD
Vertebral artery stenosis
Vertebral artery stenosis
3-vessel CAD
3-vessel CAD
Acute HFrEF (heart failure with reduced ejection fraction)
3-vessel CAD
3-vessel CAD
Acute HFrEF (heart failure with reduced ejection fraction)
Vertebral artery stenosis
3-vessel CAD
LAURA (acute kidney injury)
3-vessel CAD
Acute HFrEF (heart failure with reduced ejection fraction)
CVA (cerebrovascular accident)
3-vessel CAD
Acute HFrEF (heart failure with reduced ejection fraction)
Vertebral artery stenosis
3-vessel CAD
LAURA (acute kidney injury)
NSTEMI (non-ST elevation myocardial infarction)
Vertebral artery stenosis
3-vessel CAD
Acute HFrEF (heart failure with reduced ejection fraction)
3-vessel CAD
Acute HFrEF (heart failure with reduced ejection fraction)
3-vessel CAD
Acute HFrEF (heart failure with reduced ejection fraction)
Acute HFrEF (heart failure with reduced ejection fraction)
NSTEMI (non-ST elevation myocardial infarction)
Vertebral artery stenosis
3-vessel CAD
Vertebral artery stenosis
3-vessel CAD
Acute HFrEF (heart failure with reduced ejection fraction)

## 2023-11-21 NOTE — PROVIDER CONTACT NOTE (OTHER) - NAME OF MD/NP/PA/DO NOTIFIED:
Radha RASMUSSEN
marta matias np
Night ACP Jerrica Shelton
Yenni Fine
Kia Potts NP
CIARRA Castle
Uriel Castle
Alexis Renteria NP
Marci Ditomasso
Night ACP Jerrica Shelton
Lizeth RASMUSSEN
Ailyn Hernandez
Krista Wise, ACP
Theo Oswald
VALERY Yen
Raina Salinas PA

## 2023-11-21 NOTE — PROCEDURE NOTE - NSICDXPROCEDURE_GEN_ALL_CORE_FT
PROCEDURES:  Percutaneous catheterization of artery 21-Nov-2023 13:18:22  Marci Mccurdy  US guided vascular access 21-Nov-2023 13:18:44  Marci Mccurdy

## 2023-11-21 NOTE — PROVIDER CONTACT NOTE (OTHER) - DATE AND TIME:
06-Nov-2023 09:00
08-Nov-2023 18:07
08-Nov-2023 22:23
11-Nov-2023 08:45
19-Nov-2023 21:00
12-Nov-2023 08:53
19-Oct-2023 17:00
20-Nov-2023 23:47
11-Nov-2023 14:19
12-Nov-2023 01:01
13-Nov-2023 05:33
19-Nov-2023 05:06
04-Nov-2023 22:30
07-Nov-2023 05:15
19-Oct-2023 20:39
21-Nov-2023 05:31

## 2023-11-21 NOTE — PROGRESS NOTE ADULT - PROBLEM SELECTOR PROBLEM 1
Acute HFrEF (heart failure with reduced ejection fraction)
NSTEMI (non-ST elevation myocardial infarction)
Acute HFrEF (heart failure with reduced ejection fraction)
LAURA (acute kidney injury)
NSTEMI (non-ST elevation myocardial infarction)
NSTEMI (non-ST elevation myocardial infarction)
Acute HFrEF (heart failure with reduced ejection fraction)
Acute respiratory failure with hypoxia
NSTEMI (non-ST elevation myocardial infarction)
Acute HFrEF (heart failure with reduced ejection fraction)
Acute respiratory failure with hypoxia
Acute HFrEF (heart failure with reduced ejection fraction)
Acute HFrEF (heart failure with reduced ejection fraction)
NSTEMI (non-ST elevation myocardial infarction)
Acute HFrEF (heart failure with reduced ejection fraction)
LAURA (acute kidney injury)
Acute HFrEF (heart failure with reduced ejection fraction)
LAURA (acute kidney injury)
Acute HFrEF (heart failure with reduced ejection fraction)
Acute respiratory failure with hypoxia
Acute HFrEF (heart failure with reduced ejection fraction)
LV (left ventricular) mural thrombus
NSTEMI (non-ST elevation myocardial infarction)
Acute HFrEF (heart failure with reduced ejection fraction)
Acute HFrEF (heart failure with reduced ejection fraction)
LAURA (acute kidney injury)
LAURA (acute kidney injury)
NSTEMI (non-ST elevation myocardial infarction)
Acute HFrEF (heart failure with reduced ejection fraction)
Acute HFrEF (heart failure with reduced ejection fraction)
Acute respiratory failure with hypoxia

## 2023-11-21 NOTE — PROGRESS NOTE ADULT - PROBLEM SELECTOR PROBLEM 9
T2DM (type 2 diabetes mellitus)
Ventricular tachycardia
Prophylactic measure
Ventricular tachycardia
T2DM (type 2 diabetes mellitus)
Prophylactic measure
T2DM (type 2 diabetes mellitus)
BPH without obstruction/lower urinary tract symptoms
Ventricular tachycardia
Prophylactic measure
Ventricular tachycardia
Ventricular tachycardia
T2DM (type 2 diabetes mellitus)
Ventricular tachycardia
Ventricular tachycardia
BPH without urinary obstruction
Ventricular tachycardia

## 2023-11-21 NOTE — PROGRESS NOTE ADULT - NS MD NEURO CONDITIONS_HEART2
Systolic (HFrEF)
Diastolic (HFpEF)
Systolic (HFrEF)

## 2023-11-21 NOTE — CHART NOTE - NSCHARTNOTEFT_GEN_A_CORE
BRANDEN MARRUFO  MRN-[id number]  Patient is a 58y old  Male who presents with a chief complaint of NSTEMI (21 Nov 2023 08:14)    HPI:  Patient with separate chart from [facility], MRN# [id number]    57 yo Male pmhx CVA with mild left sided deficits, HTN, DM2, vertigo, HLD, Mobitz II s/pp Medtronic dual chamber ICD (12/21/22) initially presented to [facility] from home with complaints of dyspnea and chest pain and was admitted w/ acute hypoxic respiratory failure likely due to acute pulmonary edema due to acute HFrEF in setting of acute NSTEMI, LAURA and enterovirus infection. Pt with brief MICU stay at [facility] requiring bipap (no intubation), was treated for PNA with cefepime, and was found to have TTE done 9/26/23 showing EF 20% with severely decreased LVSF, multiple regional wall motion abnormalities, and elevated LV end diastolic pressure. Pt was transferred to [facility] for cardiac cath evaluation. Patient without any acute complaints, complaining of intermittent palpitations for the last 2 days. No chest pain, SOB, fevers, nausea, vomiting, abdominal pain.  (13 Oct 2023 21:05)      INTERVAL EVENTS: Patient has been in the CICU previously for low cardiac output. After downgrade, while on the floor became hypoxic with increased work of breathing, was escalated from NC to HFNC to NIPPV. Chest xray shows persistent bilateral pulmonary edema for which he was recently started on IV furosemide continuous infusion.    REVIEW OF SYSTEMS:  CONSTITUTIONAL: No weakness, fevers or chills  EYES/ENT: No visual changes;  No vertigo or throat pain   NECK: No pain or stiffness  RESPIRATORY: No cough, wheezing, hemoptysis; No shortness of breath  CARDIOVASCULAR: No chest pain or palpitations  GASTROINTESTINAL: No abdominal or epigastric pain. No nausea, vomiting, or hematemesis; No diarrhea or constipation. No melena or hematochezia.  GENITOURINARY: No dysuria, frequency or hematuria  NEUROLOGICAL: No numbness or weakness  SKIN: No itching, rashes      ICU Vital Signs Last 24 Hrs  T(C): 36.6 (21 Nov 2023 04:47), Max: 37.1 (20 Nov 2023 16:48)  T(F): 97.8 (21 Nov 2023 04:47), Max: 98.8 (20 Nov 2023 16:48)  HR: 84 (21 Nov 2023 08:48) (78 - 94)  BP: 90/64 (21 Nov 2023 05:00) (90/58 - 93/62)  RR: 19 (21 Nov 2023 04:47) (18 - 26)  SpO2: 90% (21 Nov 2023 08:48) (78% - 95%)    O2 Parameters below as of 21 Nov 2023 04:47  Patient On (Oxygen Delivery Method): nasal cannula  O2 Flow (L/min): 50  O2 Concentration (%): 100    I&O's Summary  20 Nov 2023 07:01  -  21 Nov 2023 07:00  --------------------------------------------------------  IN: 830 mL / OUT: 850 mL / NET: -20 mL    POCT Blood Glucose.: 144 mg/dL (21 Nov 2023 07:52)    PHYSICAL EXAM:  GENERAL: No acute distress, well-developed  HEAD:  Atraumatic, Normocephalic  EYES: EOMI, PERRLA, conjunctiva and sclera clear  NECK: Supple, no lymphadenopathy, no JVD  CHEST/LUNG: CTAB; No wheezes, rales, or rhonchi  HEART: Regular rate and rhythm. Normal S1/S2. No murmurs, rubs, or gallops  ABDOMEN: Soft, non-tender, non-distended; normal bowel sounds, no organomegaly  EXTREMITIES:  2+ peripheral pulses b/l, No clubbing, cyanosis, or edema  NEUROLOGY: A&O x 3, no focal deficits  SKIN: No rashes or lesions    ============================I/O===========================  I&O's Detail  20 Nov 2023 07:01  -  21 Nov 2023 07:00  --------------------------------------------------------  IN:    Furosemide: 90 mL    Oral Fluid: 740 mL  Total IN: 830 mL    OUT:    Indwelling Catheter - Urethral (mL): 400 mL    Voided (mL): 450 mL  Total OUT: 850 mL    Total NET: -20 mL        ============================ LABS =========================                        11.1   12.29 )-----------( 280      ( 21 Nov 2023 06:29 )             37.0     11-21    134<L>  |  99  |  84<H>  ----------------------------<  150<H>  4.8   |  19<L>  |  4.15<H>    Ca    8.3<L>      21 Nov 2023 06:29  Phos  5.4     11-19  Mg     2.5     11-21    TPro  6.1  /  Alb  3.3  /  TBili  0.3  /  DBili  x   /  AST  40  /  ALT  88<H>  /  AlkPhos  135<H>  11-19      LIVER FUNCTIONS - ( 19 Nov 2023 22:23 )  Alb: 3.3 g/dL / Pro: 6.1 g/dL / ALK PHOS: 135 U/L / ALT: 88 U/L / AST: 40 U/L / GGT: x           PT/INR - ( 21 Nov 2023 06:29 )   PT: 32.8 sec;   INR: 3.23 ratio        ABG - ( 19 Nov 2023 22:24 )  pH, Arterial: 7.31  pH, Blood: x     /  pCO2: 38    /  pO2: 92    / HCO3: 19    / Base Excess: -6.6  /  SaO2: 97.7        Blood Gas Arterial, Lactate: 1.7 mmol/L (11-19-23 @ 22:24)    Urinalysis Basic - ( 21 Nov 2023 06:29 )    Color: x / Appearance: x / SG: x / pH: x  Gluc: 150 mg/dL / Ketone: x  / Bili: x / Urobili: x   Blood: x / Protein: x / Nitrite: x   Leuk Esterase: x / RBC: x / WBC x   Sq Epi: x / Non Sq Epi: x / Bacteria: x      ======================Micro/Rad/Cardio=================  Telemetry: Reviewed   EKG: Reviewed  CXR: Reviewed, bilateral pulmonary edema  Culture: Reviewed  Echo: LV severely decreased systolic function, EF < 20%, small left ventricular thrombus  Cath:   ======================================================  PAST MEDICAL & SURGICAL HISTORY:  CVA (cerebrovascular accident)  T2DM (type 2 diabetes mellitus)  HTN (hypertension)  HLD (hyperlipidemia)  S/P cystoscopy  S/P hernia repair    ASSESSMENT  58M with previous CVA, Mobitz II heart block s/p AICD, DM2, HTN in hospital for prolonged course involving cerebellar ischemia, NSTEMI c/b by LV thrombus and sustained MMVT, pulmonary edema requiring noninvasive positive pressure ventilatory support. He is upgraded to the CICU due to respiratory distress in the setting of the worsening pulmonary edema.    PLAN  ===Neuro===  #AMS with new CVA  CTA (11/6) shows R medial cerebellar infarct, MRA (11/10) shows PICA stroke and severe L vertebral artery stenosis. Neurology recommending PCI  - oral aspirin 81mg daily  - oral atorvastatin 80mg at bedtime  - oral warfarin dosed by PT/INR daily    ===Respiratory===  #Hypoxic Respiratory Failure  Chest xray shows persistent bilateral pulmonary edema  - NIPPV 12/5/14/50%  - repeat blood gas pending  - IV furosemide (500mg/250mL) infusion @ 5mL/hr (10mg/hr)  - IV furosemide 40mg IV push as needed  - s/p bumetanide 2mg twice daily    ===Cardiovascular===  #Acute decompensated heart failure  - diuresis as noted above  - oral isosorbide dinitrate 20mg three times daily  - oral metoprolol succinate 25mg daily  - oral hydralazine 50mg every 8 hours (hold for SBP < 90)    #Monomorphic ventricular tachycardia  - oral amiodarone 200mg daily BRANDEN MARRUFO  MRN-[id number]  Patient is a 58y old  Male who presents with a chief complaint of NSTEMI (21 Nov 2023 08:14)    HPI:  Patient with separate chart from [facility], MRN# [id number]    57 yo Male pmhx CVA with mild left sided deficits, HTN, DM2, vertigo, HLD, Mobitz II s/pp Medtronic dual chamber ICD (12/21/22) initially presented to [facility] from home with complaints of dyspnea and chest pain and was admitted w/ acute hypoxic respiratory failure likely due to acute pulmonary edema due to acute HFrEF in setting of acute NSTEMI, LAURA and enterovirus infection. Pt with brief MICU stay at [facility] requiring bipap (no intubation), was treated for PNA with cefepime, and was found to have TTE done 9/26/23 showing EF 20% with severely decreased LVSF, multiple regional wall motion abnormalities, and elevated LV end diastolic pressure. Pt was transferred to [facility] for cardiac cath evaluation. Patient without any acute complaints, complaining of intermittent palpitations for the last 2 days. No chest pain, SOB, fevers, nausea, vomiting, abdominal pain.  (13 Oct 2023 21:05)    INTERVAL EVENTS: Patient has been in the CICU previously for low cardiac output. After downgrade, while on the floor became hypoxic with increased work of breathing, was escalated from NC to HFNC to NIPPV. Chest xray shows persistent bilateral pulmonary edema for which he was recently started on IV furosemide continuous infusion.    REVIEW OF SYSTEMS:  CONSTITUTIONAL: No weakness, fevers or chills  EYES/ENT: No visual changes;  No vertigo or throat pain   NECK: No pain or stiffness  RESPIRATORY: No cough, wheezing, hemoptysis; No shortness of breath  CARDIOVASCULAR: No chest pain or palpitations  GASTROINTESTINAL: No abdominal or epigastric pain. No nausea, vomiting, or hematemesis; No diarrhea or constipation. No melena or hematochezia.  GENITOURINARY: No dysuria, frequency or hematuria  NEUROLOGICAL: No numbness or weakness  SKIN: No itching, rashes      ICU Vital Signs Last 24 Hrs  T(C): 36.6 (21 Nov 2023 04:47), Max: 37.1 (20 Nov 2023 16:48)  T(F): 97.8 (21 Nov 2023 04:47), Max: 98.8 (20 Nov 2023 16:48)  HR: 84 (21 Nov 2023 08:48) (78 - 94)  BP: 90/64 (21 Nov 2023 05:00) (90/58 - 93/62)  RR: 19 (21 Nov 2023 04:47) (18 - 26)  SpO2: 90% (21 Nov 2023 08:48) (78% - 95%)    O2 Parameters below as of 21 Nov 2023 04:47  Patient On (Oxygen Delivery Method): nasal cannula  O2 Flow (L/min): 50  O2 Concentration (%): 100    I&O's Summary  20 Nov 2023 07:01  -  21 Nov 2023 07:00  --------------------------------------------------------  IN: 830 mL / OUT: 850 mL / NET: -20 mL    POCT Blood Glucose.: 144 mg/dL (21 Nov 2023 07:52)    PHYSICAL EXAM:  GENERAL: No acute distress, well-developed  HEAD:  Atraumatic, Normocephalic  EYES: EOMI, PERRLA, conjunctiva and sclera clear  NECK: Supple, no lymphadenopathy, no JVD  CHEST/LUNG: CTAB; No wheezes, rales, or rhonchi  HEART: Regular rate and rhythm. Normal S1/S2. No murmurs, rubs, or gallops  ABDOMEN: Soft, non-tender, non-distended; normal bowel sounds, no organomegaly  EXTREMITIES:  2+ peripheral pulses b/l, No clubbing, cyanosis, or edema  NEUROLOGY: A&O x 3, no focal deficits  SKIN: No rashes or lesions    ============================I/O===========================  I&O's Detail  20 Nov 2023 07:01  -  21 Nov 2023 07:00  --------------------------------------------------------  IN:    Furosemide: 90 mL    Oral Fluid: 740 mL  Total IN: 830 mL    OUT:    Indwelling Catheter - Urethral (mL): 400 mL    Voided (mL): 450 mL  Total OUT: 850 mL    Total NET: -20 mL        ============================ LABS =========================                        11.1   12.29 )-----------( 280      ( 21 Nov 2023 06:29 )             37.0     11-21    134<L>  |  99  |  84<H>  ----------------------------<  150<H>  4.8   |  19<L>  |  4.15<H>    Ca    8.3<L>      21 Nov 2023 06:29  Phos  5.4     11-19  Mg     2.5     11-21    TPro  6.1  /  Alb  3.3  /  TBili  0.3  /  DBili  x   /  AST  40  /  ALT  88<H>  /  AlkPhos  135<H>  11-19      LIVER FUNCTIONS - ( 19 Nov 2023 22:23 )  Alb: 3.3 g/dL / Pro: 6.1 g/dL / ALK PHOS: 135 U/L / ALT: 88 U/L / AST: 40 U/L / GGT: x           PT/INR - ( 21 Nov 2023 06:29 )   PT: 32.8 sec;   INR: 3.23 ratio        ABG - ( 19 Nov 2023 22:24 )  pH, Arterial: 7.31  pH, Blood: x     /  pCO2: 38    /  pO2: 92    / HCO3: 19    / Base Excess: -6.6  /  SaO2: 97.7        Blood Gas Arterial, Lactate: 1.7 mmol/L (11-19-23 @ 22:24)    Urinalysis Basic - ( 21 Nov 2023 06:29 )    Color: x / Appearance: x / SG: x / pH: x  Gluc: 150 mg/dL / Ketone: x  / Bili: x / Urobili: x   Blood: x / Protein: x / Nitrite: x   Leuk Esterase: x / RBC: x / WBC x   Sq Epi: x / Non Sq Epi: x / Bacteria: x      ======================Micro/Rad/Cardio=================  Telemetry: Reviewed   EKG: Reviewed  CXR: Reviewed, bilateral pulmonary edema  Culture: Reviewed  Echo: LV severely decreased systolic function, EF < 20%, small left ventricular thrombus  Cath:   ======================================================  PAST MEDICAL & SURGICAL HISTORY:  CVA (cerebrovascular accident)  T2DM (type 2 diabetes mellitus)  HTN (hypertension)  HLD (hyperlipidemia)  S/P cystoscopy  S/P hernia repair    ASSESSMENT  58M with previous CVA, Mobitz II heart block s/p AICD, DM2, HTN in hospital for prolonged course involving cerebellar ischemia, NSTEMI c/b by LV thrombus and sustained MMVT, pulmonary edema requiring noninvasive positive pressure ventilatory support. He is upgraded to the CICU due to respiratory distress in the setting of the worsening pulmonary edema.    PLAN  ===Neuro===  #AMS with new CVA  CTA (11/6) shows R medial cerebellar infarct, MRA (11/10) shows PICA stroke and severe L vertebral artery stenosis. Neurology recommending PCI  - oral aspirin 81mg daily  - oral atorvastatin 80mg at bedtime  - oral warfarin dosed by PT/INR daily    ===Respiratory===  #Hypoxic Respiratory Failure  Chest xray shows persistent bilateral pulmonary edema  - NIPPV 12/5/14/50%  - repeat blood gas pending  - IV furosemide (500mg/250mL) infusion @ 5mL/hr (10mg/hr)  - IV furosemide 40mg IV push as needed  - s/p bumetanide 2mg twice daily    ===Cardiovascular===  #Acute decompensated heart failure  - diuresis as noted above  - IV milrinone infusion @ 0.125mcg/kg/min  - oral isosorbide dinitrate 20mg three times daily  - oral metoprolol succinate 25mg daily  - oral hydralazine 50mg every 8 hours (hold for SBP < 90)    #Monomorphic ventricular tachycardia  - oral amiodarone 200mg daily    ===GI===  - Diet: NPO (advance with improved respiratory status)  - oral polyethylene glycol 3350 17g daily PRN  - oral senna 2 tabs at bedtime  - oral pantoprazole 40mg daily    ===Renal===  #Acute Kidney Injury  Likely cardiorenal in nature  - Cr Trend: 4.15 (11-21 @ 06:29), 3.76 (11-20 @ 06:50), 3.59 (11-19 @ 22:23)  - oral tamsulosin 0.4mg daily  - appreciate nephrology recommendations    ===Infectious Disease===  No active issues    ===Heme/Onc===  No active issues    ===Endocrine===  #Diabetes Mellitus (A1C 8.4)  - subcutaneous insulin lispro 6U before meals  - subcutaneous insulin glargine 15U at bedtime    ===Lines===  - PIV x 1 BRANDEN MARRUFO  MRN-[id number]  Patient is a 58y old  Male who presents with a chief complaint of NSTEMI (21 Nov 2023 08:14)    HPI:  Patient with separate chart from [facility], MRN# [id number]    57 yo Male pmhx CVA with mild left sided deficits, HTN, DM2, vertigo, HLD, Mobitz II s/pp Medtronic dual chamber ICD (12/21/22) initially presented to [facility] from home with complaints of dyspnea and chest pain and was admitted w/ acute hypoxic respiratory failure likely due to acute pulmonary edema due to acute HFrEF in setting of acute NSTEMI, LAURA and enterovirus infection. Pt with brief MICU stay at [facility] requiring bipap (no intubation), was treated for PNA with cefepime, and was found to have TTE done 9/26/23 showing EF 20% with severely decreased LVSF, multiple regional wall motion abnormalities, and elevated LV end diastolic pressure. Pt was transferred to [facility] for cardiac cath evaluation. Patient without any acute complaints, complaining of intermittent palpitations for the last 2 days. No chest pain, SOB, fevers, nausea, vomiting, abdominal pain.  (13 Oct 2023 21:05)    INTERVAL EVENTS: Patient has been in the CICU previously for low cardiac output. After downgrade, while on the floor became hypoxic with increased work of breathing, was escalated from NC to HFNC to NIPPV. Chest xray shows persistent bilateral pulmonary edema for which he was recently started on IV furosemide continuous infusion.    REVIEW OF SYSTEMS:  CONSTITUTIONAL: No weakness, fevers or chills  EYES/ENT: No visual changes;  No vertigo or throat pain   NECK: No pain or stiffness  RESPIRATORY: No cough, wheezing, hemoptysis; No shortness of breath  CARDIOVASCULAR: No chest pain or palpitations  GASTROINTESTINAL: No abdominal or epigastric pain. No nausea, vomiting, or hematemesis; No diarrhea or constipation. No melena or hematochezia.  GENITOURINARY: No dysuria, frequency or hematuria  NEUROLOGICAL: No numbness or weakness  SKIN: No itching, rashes      ICU Vital Signs Last 24 Hrs  T(C): 36.6 (21 Nov 2023 04:47), Max: 37.1 (20 Nov 2023 16:48)  T(F): 97.8 (21 Nov 2023 04:47), Max: 98.8 (20 Nov 2023 16:48)  HR: 84 (21 Nov 2023 08:48) (78 - 94)  BP: 90/64 (21 Nov 2023 05:00) (90/58 - 93/62)  RR: 19 (21 Nov 2023 04:47) (18 - 26)  SpO2: 90% (21 Nov 2023 08:48) (78% - 95%)    O2 Parameters below as of 21 Nov 2023 04:47  Patient On (Oxygen Delivery Method): nasal cannula  O2 Flow (L/min): 50  O2 Concentration (%): 100    I&O's Summary  20 Nov 2023 07:01  -  21 Nov 2023 07:00  --------------------------------------------------------  IN: 830 mL / OUT: 850 mL / NET: -20 mL    POCT Blood Glucose.: 144 mg/dL (21 Nov 2023 07:52)    PHYSICAL EXAM:  GENERAL: No acute distress, well-developed  HEAD:  Atraumatic, Normocephalic  EYES: EOMI, PERRLA, conjunctiva and sclera clear  NECK: Supple, no lymphadenopathy, no JVD  CHEST/LUNG: CTAB; No wheezes, rales, or rhonchi  HEART: Regular rate and rhythm. Normal S1/S2. No murmurs, rubs, or gallops  ABDOMEN: Soft, non-tender, non-distended; normal bowel sounds, no organomegaly  EXTREMITIES:  2+ peripheral pulses b/l, No clubbing, cyanosis, or edema  NEUROLOGY: A&O x 3, no focal deficits  SKIN: No rashes or lesions    ============================I/O===========================  I&O's Detail  20 Nov 2023 07:01  -  21 Nov 2023 07:00  --------------------------------------------------------  IN:    Furosemide: 90 mL    Oral Fluid: 740 mL  Total IN: 830 mL    OUT:    Indwelling Catheter - Urethral (mL): 400 mL    Voided (mL): 450 mL  Total OUT: 850 mL    Total NET: -20 mL        ============================ LABS =========================                        11.1   12.29 )-----------( 280      ( 21 Nov 2023 06:29 )             37.0     11-21    134<L>  |  99  |  84<H>  ----------------------------<  150<H>  4.8   |  19<L>  |  4.15<H>    Ca    8.3<L>      21 Nov 2023 06:29  Phos  5.4     11-19  Mg     2.5     11-21    TPro  6.1  /  Alb  3.3  /  TBili  0.3  /  DBili  x   /  AST  40  /  ALT  88<H>  /  AlkPhos  135<H>  11-19      LIVER FUNCTIONS - ( 19 Nov 2023 22:23 )  Alb: 3.3 g/dL / Pro: 6.1 g/dL / ALK PHOS: 135 U/L / ALT: 88 U/L / AST: 40 U/L / GGT: x           PT/INR - ( 21 Nov 2023 06:29 )   PT: 32.8 sec;   INR: 3.23 ratio        ABG - ( 19 Nov 2023 22:24 )  pH, Arterial: 7.31  pH, Blood: x     /  pCO2: 38    /  pO2: 92    / HCO3: 19    / Base Excess: -6.6  /  SaO2: 97.7        Blood Gas Arterial, Lactate: 1.7 mmol/L (11-19-23 @ 22:24)    Urinalysis Basic - ( 21 Nov 2023 06:29 )    Color: x / Appearance: x / SG: x / pH: x  Gluc: 150 mg/dL / Ketone: x  / Bili: x / Urobili: x   Blood: x / Protein: x / Nitrite: x   Leuk Esterase: x / RBC: x / WBC x   Sq Epi: x / Non Sq Epi: x / Bacteria: x      ======================Micro/Rad/Cardio=================  Telemetry: Reviewed   EKG: Reviewed  CXR: Reviewed, bilateral pulmonary edema  Culture: Reviewed  Echo: LV severely decreased systolic function, EF < 20%, small left ventricular thrombus  Cath:   ======================================================  PAST MEDICAL & SURGICAL HISTORY:  CVA (cerebrovascular accident)  T2DM (type 2 diabetes mellitus)  HTN (hypertension)  HLD (hyperlipidemia)  S/P cystoscopy  S/P hernia repair    ASSESSMENT  58M with previous CVA, Mobitz II heart block s/p AICD, DM2, HTN in hospital for prolonged course involving cerebellar ischemia, NSTEMI c/b by LV thrombus and sustained MMVT, pulmonary edema requiring noninvasive positive pressure ventilatory support. He is upgraded to the CICU due to respiratory distress in the setting of the worsening pulmonary edema.    PLAN  ===Neuro===  #AMS with new CVA  CTA (11/6) shows R medial cerebellar infarct, MRA (11/10) shows PICA stroke and severe L vertebral artery stenosis. Neurology recommending PCI  - oral aspirin 81mg daily  - oral atorvastatin 80mg at bedtime  - oral warfarin dosed by PT/INR daily    ===Respiratory===  #Hypoxic Respiratory Failure  Chest xray shows persistent bilateral pulmonary edema  - NIPPV 12/5/14/50%  - repeat blood gas pending  - IV furosemide (500mg/250mL) infusion @ 5mL/hr (10mg/hr)  - IV furosemide 40mg IV push as needed  - s/p bumetanide 2mg twice daily    ===Cardiovascular===  #Acute decompensated heart failure  - diuresis as noted above  - IV milrinone infusion @ 0.125mcg/kg/min  - oral isosorbide dinitrate 20mg three times daily  - oral metoprolol succinate 25mg daily  - oral hydralazine 50mg every 8 hours (hold for SBP < 90)    #Monomorphic ventricular tachycardia  - oral amiodarone 200mg daily    ===GI===  - Diet: NPO (advance with improved respiratory status)  - oral polyethylene glycol 3350 17g daily PRN  - oral senna 2 tabs at bedtime  - oral pantoprazole 40mg daily    ===Renal===  #Acute Kidney Injury  Likely cardiorenal in nature  - Cr Trend: 4.15 (11-21 @ 06:29), 3.76 (11-20 @ 06:50), 3.59 (11-19 @ 22:23)  - oral tamsulosin 0.4mg daily  - appreciate nephrology recommendations    ===Infectious Disease===  No active issues    ===Heme/Onc===  #LV Thrombus  Patient currently supratherapeutic INR  - anticoagulation as described above    ===Endocrine===  #Diabetes Mellitus (A1C 8.4)  - subcutaneous insulin lispro 6U before meals  - subcutaneous insulin glargine 15U at bedtime    ===Lines===  - PIV x 1 BRANDEN MARRUFO  MRN-[id number]  Patient is a 58y old  Male who presents with a chief complaint of NSTEMI (21 Nov 2023 08:14)    HPI:  Patient with separate chart from [facility], MRN# [id number]    59 yo Male pmhx CVA with mild left sided deficits, HTN, DM2, vertigo, HLD, Mobitz II s/pp Medtronic dual chamber ICD (12/21/22) initially presented to [facility] from home with complaints of dyspnea and chest pain and was admitted w/ acute hypoxic respiratory failure likely due to acute pulmonary edema due to acute HFrEF in setting of acute NSTEMI, LAURA and enterovirus infection. Pt with brief MICU stay at [facility] requiring bipap (no intubation), was treated for PNA with cefepime, and was found to have TTE done 9/26/23 showing EF 20% with severely decreased LVSF, multiple regional wall motion abnormalities, and elevated LV end diastolic pressure. Pt was transferred to [facility] for cardiac cath evaluation. Patient without any acute complaints, complaining of intermittent palpitations for the last 2 days. No chest pain, SOB, fevers, nausea, vomiting, abdominal pain.  (13 Oct 2023 21:05)    INTERVAL EVENTS: Patient has been in the CICU previously for low cardiac output. After downgrade, while on the floor became hypoxic with increased work of breathing, was escalated from NC to HFNC to NIPPV. Chest xray shows persistent bilateral pulmonary edema for which he was recently started on IV furosemide continuous infusion.    REVIEW OF SYSTEMS:  CONSTITUTIONAL: No weakness, fevers or chills  EYES/ENT: No visual changes;  No vertigo or throat pain   NECK: No pain or stiffness  RESPIRATORY: No cough, wheezing, hemoptysis; No shortness of breath  CARDIOVASCULAR: No chest pain or palpitations  GASTROINTESTINAL: No abdominal or epigastric pain. No nausea, vomiting, or hematemesis; No diarrhea or constipation. No melena or hematochezia.  GENITOURINARY: No dysuria, frequency or hematuria  NEUROLOGICAL: No numbness or weakness  SKIN: No itching, rashes      ICU Vital Signs Last 24 Hrs  T(C): 36.6 (21 Nov 2023 04:47), Max: 37.1 (20 Nov 2023 16:48)  T(F): 97.8 (21 Nov 2023 04:47), Max: 98.8 (20 Nov 2023 16:48)  HR: 84 (21 Nov 2023 08:48) (78 - 94)  BP: 90/64 (21 Nov 2023 05:00) (90/58 - 93/62)  RR: 19 (21 Nov 2023 04:47) (18 - 26)  SpO2: 90% (21 Nov 2023 08:48) (78% - 95%)    O2 Parameters below as of 21 Nov 2023 04:47  Patient On (Oxygen Delivery Method): nasal cannula  O2 Flow (L/min): 50  O2 Concentration (%): 100    I&O's Summary  20 Nov 2023 07:01  -  21 Nov 2023 07:00  --------------------------------------------------------  IN: 830 mL / OUT: 850 mL / NET: -20 mL    POCT Blood Glucose.: 144 mg/dL (21 Nov 2023 07:52)    PHYSICAL EXAM:  GENERAL: No acute distress, well-developed  HEAD:  Atraumatic, Normocephalic  EYES: EOMI, PERRLA, conjunctiva and sclera clear  NECK: Supple, no lymphadenopathy, no JVD  CHEST/LUNG: CTAB; No wheezes, rales, or rhonchi  HEART: Regular rate and rhythm. Normal S1/S2. No murmurs, rubs, or gallops  ABDOMEN: Soft, non-tender, non-distended; normal bowel sounds, no organomegaly  EXTREMITIES:  2+ peripheral pulses b/l, No clubbing, cyanosis, or edema  NEUROLOGY: A&O x 3, no focal deficits  SKIN: No rashes or lesions    ============================I/O===========================  I&O's Detail  20 Nov 2023 07:01  -  21 Nov 2023 07:00  --------------------------------------------------------  IN:    Furosemide: 90 mL    Oral Fluid: 740 mL  Total IN: 830 mL    OUT:    Indwelling Catheter - Urethral (mL): 400 mL    Voided (mL): 450 mL  Total OUT: 850 mL    Total NET: -20 mL        ============================ LABS =========================                        11.1   12.29 )-----------( 280      ( 21 Nov 2023 06:29 )             37.0     11-21    134<L>  |  99  |  84<H>  ----------------------------<  150<H>  4.8   |  19<L>  |  4.15<H>    Ca    8.3<L>      21 Nov 2023 06:29  Phos  5.4     11-19  Mg     2.5     11-21    TPro  6.1  /  Alb  3.3  /  TBili  0.3  /  DBili  x   /  AST  40  /  ALT  88<H>  /  AlkPhos  135<H>  11-19      LIVER FUNCTIONS - ( 19 Nov 2023 22:23 )  Alb: 3.3 g/dL / Pro: 6.1 g/dL / ALK PHOS: 135 U/L / ALT: 88 U/L / AST: 40 U/L / GGT: x           PT/INR - ( 21 Nov 2023 06:29 )   PT: 32.8 sec;   INR: 3.23 ratio        ABG - ( 19 Nov 2023 22:24 )  pH, Arterial: 7.31  pH, Blood: x     /  pCO2: 38    /  pO2: 92    / HCO3: 19    / Base Excess: -6.6  /  SaO2: 97.7        Blood Gas Arterial, Lactate: 1.7 mmol/L (11-19-23 @ 22:24)    Urinalysis Basic - ( 21 Nov 2023 06:29 )    Color: x / Appearance: x / SG: x / pH: x  Gluc: 150 mg/dL / Ketone: x  / Bili: x / Urobili: x   Blood: x / Protein: x / Nitrite: x   Leuk Esterase: x / RBC: x / WBC x   Sq Epi: x / Non Sq Epi: x / Bacteria: x      ======================Micro/Rad/Cardio=================  Telemetry: Reviewed   EKG: Reviewed  CXR: Reviewed, bilateral pulmonary edema  Culture: Reviewed  Echo: LV severely decreased systolic function, EF < 20%, small left ventricular thrombus  Cath:   ======================================================  PAST MEDICAL & SURGICAL HISTORY:  CVA (cerebrovascular accident)  T2DM (type 2 diabetes mellitus)  HTN (hypertension)  HLD (hyperlipidemia)  S/P cystoscopy  S/P hernia repair    ASSESSMENT  58M with previous CVA, Mobitz II heart block s/p AICD, DM2, HTN in hospital for prolonged course involving cerebellar ischemia, NSTEMI c/b by LV thrombus and sustained MMVT, pulmonary edema requiring noninvasive positive pressure ventilatory support. He is upgraded to the CICU due to respiratory distress in the setting of the worsening pulmonary edema.    PLAN  ===Neuro===  #AMS with new CVA  CTA (11/6) shows R medial cerebellar infarct, MRA (11/10) shows PICA stroke and severe L vertebral artery stenosis. Neurology recommending PCI  - oral aspirin 81mg daily  - oral atorvastatin 80mg at bedtime  - oral warfarin dosed by PT/INR daily    ===Respiratory===  #Hypoxic Respiratory Failure  Chest xray shows persistent bilateral pulmonary edema  - NIPPV 12/5/14/50%  - repeat blood gas pending  - IV furosemide (500mg/250mL) infusion @ 5mL/hr (10mg/hr)  - IV furosemide 40mg IV push as needed  - s/p bumetanide 2mg twice daily    ===Cardiovascular===  #Acute decompensated heart failure  - diuresis as noted above  - IV milrinone infusion @ 0.125mcg/kg/min  - oral isosorbide dinitrate 20mg three times daily  - oral metoprolol succinate 25mg daily  - oral hydralazine 50mg every 8 hours (hold for SBP < 90)    #Monomorphic ventricular tachycardia  - oral amiodarone 200mg daily    ===GI===  - Diet: NPO (advance with improved respiratory status)  - oral polyethylene glycol 3350 17g daily PRN  - oral senna 2 tabs at bedtime  - oral pantoprazole 40mg daily    ===Renal===  #Acute Kidney Injury  Likely cardiorenal in nature  - Cr Trend: 4.15 (11-21 @ 06:29), 3.76 (11-20 @ 06:50), 3.59 (11-19 @ 22:23)  - oral tamsulosin 0.4mg daily  - appreciate nephrology recommendations    ===Infectious Disease===  No active issues    ===Heme/Onc===  #LV Thrombus  Patient currently supratherapeutic INR  - anticoagulation as described above    ===Endocrine===  #Diabetes Mellitus (A1C 8.4)  - subcutaneous insulin lispro 6U before meals  - subcutaneous insulin glargine 15U at bedtime    ===Lines===  - PIV x 1    Saw and examined Mr Marrufo with the team and agree with above. Discussed at length with heart failure team who in turn discussed with multiple HF providers and all agree that there is no good route forward for him, given his stroke and inability to tolerate heart-lung bypass. Spoke with his wife aracelis and will transition to comfort measures, stop bipap and wean vaso with opiates and bdz as needed to make him comfortable.    75 minutes of critical care time spent independent of teaching/procedures

## 2023-11-21 NOTE — PROGRESS NOTE ADULT - NS ATTEND AMEND GEN_ALL_CORE FT
Seen, examined with, formulated plan with and  agree with above as scribed by NP Pauline [[email or web address]]     on bipap   lungs decrease bs   heart RRR  ext + edema    LAURA   cardiomyopathy   CHF    lasix drip 10 mg/hr   keep O> I   primacor drip for increase cardiac outpt  d/w CICU team

## 2023-11-21 NOTE — PROGRESS NOTE ADULT - NS ATTEND OPT1 GEN_ALL_CORE

## 2023-11-21 NOTE — PROGRESS NOTE ADULT - PROBLEM/PLAN-1
DISPLAY PLAN FREE TEXT
09-Oct-2023 05:39
DISPLAY PLAN FREE TEXT

## 2023-11-21 NOTE — CHART NOTE - NSCHARTNOTESELECT_GEN_ALL_CORE
Accept note/Transfer Note
CICU Downgrade/Transfer Note
CICU/Transfer Note
Event Note
Event Note
Neurology
Neurology/Event Note
Nutrition Services
Right Pupil/Event Note
Transfer Note
AICD info
CICU Accept
CICU Accept/Transfer Note
CICU/Transfer Note
Cath site check/Event Note
Cath site check/Event Note
Chart Note
EP/Event Note
EP/Event Note
Event Note
SOB/Event Note
hemodynamics/Event Note

## 2023-11-21 NOTE — PROCEDURE NOTE - NSPROCDETAILS_GEN_ALL_CORE
location identified, draped/prepped, sterile technique used, needle inserted/introduced/positive blood return obtained via catheter/connected to a pressurized flush line/sutured in place
guidewire recovered/lumen(s) aspirated and flushed/sterile dressing applied/sterile technique, catheter placed/ultrasound guidance with use of sterile gel and probe cove
location identified, draped/prepped, sterile technique used, needle inserted/introduced/positive blood return obtained via catheter/connected to a pressurized flush line/sutured in place/hemostasis with direct pressure, dressing applied/Seldinger technique/all materials/supplies accounted for at end of procedure

## 2023-12-12 ENCOUNTER — APPOINTMENT (OUTPATIENT)
Dept: ELECTROPHYSIOLOGY | Facility: CLINIC | Age: 58
End: 2023-12-12

## 2024-01-08 ENCOUNTER — APPOINTMENT (OUTPATIENT)
Dept: ENDOCRINOLOGY | Facility: CLINIC | Age: 59
End: 2024-01-08

## 2024-01-24 RX ORDER — CLOPIDOGREL BISULFATE 75 MG/1
1 TABLET, FILM COATED ORAL
Refills: 0 | DISCHARGE

## 2024-01-24 RX ORDER — ALFUZOSIN HYDROCHLORIDE 10 MG/1
1 TABLET, EXTENDED RELEASE ORAL
Refills: 0 | DISCHARGE

## 2024-01-24 RX ORDER — ATORVASTATIN CALCIUM 80 MG/1
1 TABLET, FILM COATED ORAL
Refills: 0 | DISCHARGE

## 2024-01-24 RX ORDER — LOSARTAN POTASSIUM 100 MG/1
1 TABLET, FILM COATED ORAL
Refills: 0 | DISCHARGE

## 2024-01-24 RX ORDER — OMEGA-3 ACID ETHYL ESTERS 1 G
0 CAPSULE ORAL
Refills: 0 | DISCHARGE

## 2024-01-24 RX ORDER — METFORMIN HYDROCHLORIDE 850 MG/1
1 TABLET ORAL
Refills: 0 | DISCHARGE

## 2024-01-24 RX ORDER — ASPIRIN/CALCIUM CARB/MAGNESIUM 324 MG
1 TABLET ORAL
Refills: 0 | DISCHARGE

## 2024-01-24 RX ORDER — ERTUGLIFLOZIN 5 MG/1
1 TABLET, FILM COATED ORAL
Refills: 0 | DISCHARGE

## 2024-01-24 RX ORDER — FINASTERIDE 5 MG/1
1 TABLET, FILM COATED ORAL
Refills: 0 | DISCHARGE

## 2024-01-24 RX ORDER — AMLODIPINE BESYLATE 2.5 MG/1
1 TABLET ORAL
Refills: 0 | DISCHARGE

## 2024-05-09 ENCOUNTER — APPOINTMENT (OUTPATIENT)
Dept: ELECTROPHYSIOLOGY | Facility: CLINIC | Age: 59
End: 2024-05-09

## 2024-10-23 NOTE — DISCHARGE NOTE NURSING/CASE MANAGEMENT/SOCIAL WORK - PATIENT PORTAL LINK FT
Controlled with current regime
You can access the FollowMyHealth Patient Portal offered by [facility] by registering at the following website: [email or web address]. By joining [facility] FollowMyHealth portal, you will also be able to view your health information using other applications (apps) compatible with our system.

## 2024-12-09 NOTE — ED ADULT NURSE NOTE - SEPSIS REFERENCE DATA CRITERIA 1
Hide Include Location In Plan Question?: No
Detail Level: Zone
Abormal VS: Temp > 100F or < 96.8F; SBP < 90 mmHG; HR > 120bpm; Resp > 24/min

## 2025-02-11 NOTE — DIETITIAN INITIAL EVALUATION ADULT - NSPROEDAABILITYLEARN_GEN_A_NUR
Risk factor modification.  Continue low-sodium heart healthy diet.  Continue Zetia 10 mg daily, Crestor 40 mg daily.  Reduce saturated fats.   none